# Patient Record
Sex: FEMALE | Race: WHITE | Employment: OTHER | URBAN - NONMETROPOLITAN AREA
[De-identification: names, ages, dates, MRNs, and addresses within clinical notes are randomized per-mention and may not be internally consistent; named-entity substitution may affect disease eponyms.]

---

## 2017-01-17 ENCOUNTER — OFFICE VISIT (OUTPATIENT)
Dept: FAMILY MEDICINE CLINIC | Age: 62
End: 2017-01-17

## 2017-01-17 VITALS
HEART RATE: 80 BPM | HEIGHT: 60 IN | DIASTOLIC BLOOD PRESSURE: 60 MMHG | SYSTOLIC BLOOD PRESSURE: 132 MMHG | BODY MASS INDEX: 26.66 KG/M2 | WEIGHT: 135.8 LBS

## 2017-01-17 DIAGNOSIS — Z00.00 WELLNESS EXAMINATION: Primary | ICD-10-CM

## 2017-01-17 PROCEDURE — 99396 PREV VISIT EST AGE 40-64: CPT | Performed by: FAMILY MEDICINE

## 2017-01-17 RX ORDER — SIMVASTATIN 20 MG
20 TABLET ORAL DAILY
Qty: 90 TABLET | Refills: 1 | Status: SHIPPED | OUTPATIENT
Start: 2017-01-17 | End: 2017-07-17 | Stop reason: SDUPTHER

## 2017-01-17 RX ORDER — TRAZODONE HYDROCHLORIDE 50 MG/1
50 TABLET ORAL NIGHTLY
Qty: 90 TABLET | Refills: 1 | Status: SHIPPED | OUTPATIENT
Start: 2017-01-17 | End: 2017-07-17 | Stop reason: SDUPTHER

## 2017-01-17 RX ORDER — PIOGLITAZONE HCL AND METFORMIN HCL 500; 15 MG/1; MG/1
1 TABLET ORAL 2 TIMES DAILY WITH MEALS
Qty: 180 TABLET | Refills: 1 | Status: SHIPPED | OUTPATIENT
Start: 2017-01-17 | End: 2017-07-17 | Stop reason: SDUPTHER

## 2017-01-17 RX ORDER — CLOPIDOGREL BISULFATE 75 MG/1
75 TABLET ORAL DAILY
Qty: 90 TABLET | Refills: 1 | Status: SHIPPED | OUTPATIENT
Start: 2017-01-17 | End: 2017-07-17 | Stop reason: SDUPTHER

## 2017-01-17 RX ORDER — LOSARTAN POTASSIUM 100 MG/1
100 TABLET ORAL DAILY
Qty: 90 TABLET | Refills: 1 | Status: SHIPPED | OUTPATIENT
Start: 2017-01-17 | End: 2017-07-17 | Stop reason: SDUPTHER

## 2017-01-17 RX ORDER — BUPROPION HYDROCHLORIDE 150 MG/1
150 TABLET, EXTENDED RELEASE ORAL 2 TIMES DAILY
Qty: 180 TABLET | Refills: 1 | Status: SHIPPED | OUTPATIENT
Start: 2017-01-17 | End: 2017-07-17 | Stop reason: SDUPTHER

## 2017-01-19 LAB
CHOLESTEROL: 179 MG/DL
GLUCOSE: 81 MG/DL (ref 70–100)
HDLC SERPL-MCNC: 109 MG/DL (ref 40–60)

## 2017-01-20 LAB — LDL CHOLESTEROL DIRECT: 73 MG/DL

## 2017-03-30 RX ORDER — CHLORDIAZEPOXIDE HYDROCHLORIDE AND CLIDINIUM BROMIDE 5; 2.5 MG/1; MG/1
CAPSULE ORAL
Qty: 180 CAPSULE | Refills: 0 | Status: SHIPPED | OUTPATIENT
Start: 2017-03-30 | End: 2017-06-28 | Stop reason: SDUPTHER

## 2017-04-04 ENCOUNTER — TELEPHONE (OUTPATIENT)
Dept: FAMILY MEDICINE CLINIC | Age: 62
End: 2017-04-04

## 2017-05-12 ENCOUNTER — OFFICE VISIT (OUTPATIENT)
Dept: FAMILY MEDICINE CLINIC | Age: 62
End: 2017-05-12

## 2017-05-12 VITALS
BODY MASS INDEX: 28.12 KG/M2 | WEIGHT: 144 LBS | SYSTOLIC BLOOD PRESSURE: 134 MMHG | HEART RATE: 72 BPM | DIASTOLIC BLOOD PRESSURE: 70 MMHG

## 2017-05-12 DIAGNOSIS — R53.82 CHRONIC FATIGUE: Primary | ICD-10-CM

## 2017-05-12 PROCEDURE — 99212 OFFICE O/P EST SF 10 MIN: CPT | Performed by: FAMILY MEDICINE

## 2017-05-12 ASSESSMENT — PATIENT HEALTH QUESTIONNAIRE - PHQ9
SUM OF ALL RESPONSES TO PHQ9 QUESTIONS 1 & 2: 0
SUM OF ALL RESPONSES TO PHQ QUESTIONS 1-9: 0
1. LITTLE INTEREST OR PLEASURE IN DOING THINGS: 0
2. FEELING DOWN, DEPRESSED OR HOPELESS: 0

## 2017-05-25 ENCOUNTER — TELEPHONE (OUTPATIENT)
Dept: FAMILY MEDICINE CLINIC | Age: 62
End: 2017-05-25

## 2017-06-22 ENCOUNTER — OFFICE VISIT (OUTPATIENT)
Dept: FAMILY MEDICINE CLINIC | Age: 62
End: 2017-06-22

## 2017-06-22 VITALS
HEIGHT: 64 IN | DIASTOLIC BLOOD PRESSURE: 74 MMHG | HEART RATE: 66 BPM | SYSTOLIC BLOOD PRESSURE: 126 MMHG | BODY MASS INDEX: 24.59 KG/M2 | WEIGHT: 144 LBS

## 2017-06-22 DIAGNOSIS — E11.9 WELL CONTROLLED TYPE 2 DIABETES MELLITUS (HCC): Chronic | ICD-10-CM

## 2017-06-22 DIAGNOSIS — R10.84 GENERALIZED ABDOMINAL PAIN: Primary | ICD-10-CM

## 2017-06-22 DIAGNOSIS — Z12.11 SCREENING FOR COLON CANCER: ICD-10-CM

## 2017-06-22 LAB
CREATININE URINE POCT: 200
MICROALBUMIN/CREAT 24H UR: 30 MG/G{CREAT}
MICROALBUMIN/CREAT UR-RTO: <30

## 2017-06-22 PROCEDURE — 99213 OFFICE O/P EST LOW 20 MIN: CPT | Performed by: FAMILY MEDICINE

## 2017-06-22 PROCEDURE — 82044 UR ALBUMIN SEMIQUANTITATIVE: CPT | Performed by: FAMILY MEDICINE

## 2017-06-22 ASSESSMENT — ENCOUNTER SYMPTOMS
ABDOMINAL PAIN: 1
SHORTNESS OF BREATH: 0
ABDOMINAL DISTENTION: 1
VOMITING: 0
BLOOD IN STOOL: 0
NAUSEA: 0
CONSTIPATION: 1
CHEST TIGHTNESS: 0
DIARRHEA: 0

## 2017-06-27 DIAGNOSIS — Z12.11 SCREENING FOR COLON CANCER: ICD-10-CM

## 2017-06-27 LAB
CONTROL: PRESENT
HEMOCCULT STL QL: NEGATIVE

## 2017-06-27 PROCEDURE — 82274 ASSAY TEST FOR BLOOD FECAL: CPT | Performed by: FAMILY MEDICINE

## 2017-06-28 RX ORDER — CHLORDIAZEPOXIDE HYDROCHLORIDE AND CLIDINIUM BROMIDE 5; 2.5 MG/1; MG/1
CAPSULE ORAL
Qty: 180 CAPSULE | Refills: 0 | Status: SHIPPED | OUTPATIENT
Start: 2017-06-28 | End: 2017-07-17 | Stop reason: SDUPTHER

## 2017-07-17 ENCOUNTER — OFFICE VISIT (OUTPATIENT)
Dept: FAMILY MEDICINE CLINIC | Age: 62
End: 2017-07-17
Payer: COMMERCIAL

## 2017-07-17 VITALS
HEIGHT: 64 IN | SYSTOLIC BLOOD PRESSURE: 108 MMHG | WEIGHT: 144 LBS | HEART RATE: 74 BPM | DIASTOLIC BLOOD PRESSURE: 60 MMHG | BODY MASS INDEX: 24.59 KG/M2

## 2017-07-17 DIAGNOSIS — R29.898 WEAKNESS OF BOTH LOWER EXTREMITIES: ICD-10-CM

## 2017-07-17 DIAGNOSIS — I63.00 CEREBRAL INFARCTION DUE TO THROMBOSIS OF PRECEREBRAL ARTERY (HCC): Chronic | ICD-10-CM

## 2017-07-17 DIAGNOSIS — J30.89 PERENNIAL ALLERGIC RHINITIS, UNSPECIFIED ALLERGIC RHINITIS TRIGGER: Chronic | ICD-10-CM

## 2017-07-17 DIAGNOSIS — E78.00 PURE HYPERCHOLESTEROLEMIA: ICD-10-CM

## 2017-07-17 DIAGNOSIS — I10 ESSENTIAL HYPERTENSION: Chronic | ICD-10-CM

## 2017-07-17 DIAGNOSIS — F33.1 MODERATE EPISODE OF RECURRENT MAJOR DEPRESSIVE DISORDER (HCC): ICD-10-CM

## 2017-07-17 DIAGNOSIS — K58.9 IRRITABLE BOWEL SYNDROME WITHOUT DIARRHEA: ICD-10-CM

## 2017-07-17 DIAGNOSIS — F41.9 ANXIETY DISORDER, UNSPECIFIED TYPE: ICD-10-CM

## 2017-07-17 DIAGNOSIS — E11.9 WELL CONTROLLED TYPE 2 DIABETES MELLITUS (HCC): Primary | Chronic | ICD-10-CM

## 2017-07-17 DIAGNOSIS — Z12.31 ENCOUNTER FOR SCREENING MAMMOGRAM FOR BREAST CANCER: ICD-10-CM

## 2017-07-17 DIAGNOSIS — F51.01 PRIMARY INSOMNIA: ICD-10-CM

## 2017-07-17 LAB — HBA1C MFR BLD: 5.6 %

## 2017-07-17 PROCEDURE — 99214 OFFICE O/P EST MOD 30 MIN: CPT | Performed by: FAMILY MEDICINE

## 2017-07-17 PROCEDURE — 83036 HEMOGLOBIN GLYCOSYLATED A1C: CPT | Performed by: FAMILY MEDICINE

## 2017-07-17 RX ORDER — LOSARTAN POTASSIUM 100 MG/1
100 TABLET ORAL DAILY
Qty: 90 TABLET | Refills: 1 | Status: SHIPPED | OUTPATIENT
Start: 2017-07-17 | End: 2018-01-15 | Stop reason: SDUPTHER

## 2017-07-17 RX ORDER — BUPROPION HYDROCHLORIDE 150 MG/1
150 TABLET, EXTENDED RELEASE ORAL 2 TIMES DAILY
Qty: 180 TABLET | Refills: 1 | Status: SHIPPED | OUTPATIENT
Start: 2017-07-17 | End: 2018-01-15 | Stop reason: SDUPTHER

## 2017-07-17 RX ORDER — SIMVASTATIN 20 MG
20 TABLET ORAL DAILY
Qty: 90 TABLET | Refills: 1 | Status: SHIPPED | OUTPATIENT
Start: 2017-07-17 | End: 2018-01-15 | Stop reason: SDUPTHER

## 2017-07-17 RX ORDER — TRAZODONE HYDROCHLORIDE 50 MG/1
50 TABLET ORAL NIGHTLY
Qty: 90 TABLET | Refills: 1 | Status: SHIPPED | OUTPATIENT
Start: 2017-07-17 | End: 2018-01-15 | Stop reason: SDUPTHER

## 2017-07-17 RX ORDER — CHLORDIAZEPOXIDE HYDROCHLORIDE AND CLIDINIUM BROMIDE 5; 2.5 MG/1; MG/1
CAPSULE ORAL
Qty: 180 CAPSULE | Refills: 0 | Status: SHIPPED | OUTPATIENT
Start: 2017-07-17 | End: 2018-01-15 | Stop reason: SDUPTHER

## 2017-07-17 RX ORDER — PIOGLITAZONE HCL AND METFORMIN HCL 500; 15 MG/1; MG/1
1 TABLET ORAL 2 TIMES DAILY WITH MEALS
Qty: 180 TABLET | Refills: 1 | Status: SHIPPED | OUTPATIENT
Start: 2017-07-17 | End: 2018-01-16 | Stop reason: CLARIF

## 2017-07-17 RX ORDER — HYDROXYZINE HYDROCHLORIDE 25 MG/1
25 TABLET, FILM COATED ORAL EVERY 8 HOURS PRN
Qty: 90 TABLET | Refills: 1 | Status: SHIPPED | OUTPATIENT
Start: 2017-07-17 | End: 2017-07-27

## 2017-07-17 RX ORDER — CLOPIDOGREL BISULFATE 75 MG/1
75 TABLET ORAL DAILY
Qty: 90 TABLET | Refills: 1 | Status: SHIPPED | OUTPATIENT
Start: 2017-07-17 | End: 2018-01-15 | Stop reason: SDUPTHER

## 2017-07-17 RX ORDER — FLUTICASONE PROPIONATE 50 MCG
SPRAY, SUSPENSION (ML) NASAL
Qty: 3 BOTTLE | Refills: 1 | Status: SHIPPED | OUTPATIENT
Start: 2017-07-17 | End: 2018-04-11

## 2017-07-17 RX ORDER — LACTULOSE 10 G/15ML
10 SOLUTION ORAL 2 TIMES DAILY PRN
Qty: 4 BOTTLE | Refills: 1 | Status: SHIPPED | OUTPATIENT
Start: 2017-07-17 | End: 2017-07-18 | Stop reason: SDUPTHER

## 2017-07-17 ASSESSMENT — ENCOUNTER SYMPTOMS
EYE DISCHARGE: 0
EYE REDNESS: 0
DIARRHEA: 0
SHORTNESS OF BREATH: 0
CONSTIPATION: 0
COLOR CHANGE: 0
VOMITING: 0
NAUSEA: 0
CHEST TIGHTNESS: 0

## 2017-07-18 DIAGNOSIS — K58.9 IRRITABLE BOWEL SYNDROME WITHOUT DIARRHEA: ICD-10-CM

## 2017-07-18 RX ORDER — LACTULOSE 10 G/15ML
10 SOLUTION ORAL 2 TIMES DAILY PRN
Qty: 4 BOTTLE | Refills: 1 | Status: SHIPPED | OUTPATIENT
Start: 2017-07-18 | End: 2017-07-21 | Stop reason: SDUPTHER

## 2017-07-19 ENCOUNTER — HOSPITAL ENCOUNTER (OUTPATIENT)
Dept: MAMMOGRAPHY | Age: 62
Discharge: HOME | End: 2017-07-19
Payer: COMMERCIAL

## 2017-07-19 DIAGNOSIS — Z12.31 ENCOUNTER FOR SCREENING MAMMOGRAM FOR BREAST CANCER: ICD-10-CM

## 2017-07-19 PROCEDURE — G0202 SCR MAMMO BI INCL CAD: HCPCS

## 2017-07-21 ENCOUNTER — HOSPITAL ENCOUNTER (OUTPATIENT)
Dept: WOMENS IMAGING | Age: 62
Discharge: HOME | End: 2017-07-21
Payer: COMMERCIAL

## 2017-07-21 ENCOUNTER — APPOINTMENT (OUTPATIENT)
Dept: WOMENS IMAGING | Age: 62
End: 2017-07-21
Payer: COMMERCIAL

## 2017-07-21 DIAGNOSIS — K58.9 IRRITABLE BOWEL SYNDROME WITHOUT DIARRHEA: ICD-10-CM

## 2017-07-21 DIAGNOSIS — R92.2 BREAST DENSITY: ICD-10-CM

## 2017-07-21 PROCEDURE — G0206 DX MAMMO INCL CAD UNI: HCPCS

## 2017-07-21 RX ORDER — LACTULOSE 10 G/15ML
10 SOLUTION ORAL 2 TIMES DAILY PRN
Qty: 946 ML | Refills: 1 | Status: SHIPPED | OUTPATIENT
Start: 2017-07-21 | End: 2018-04-03 | Stop reason: SDUPTHER

## 2017-09-25 ENCOUNTER — TELEPHONE (OUTPATIENT)
Dept: FAMILY MEDICINE CLINIC | Age: 62
End: 2017-09-25

## 2017-09-27 ENCOUNTER — OFFICE VISIT (OUTPATIENT)
Dept: FAMILY MEDICINE CLINIC | Age: 62
End: 2017-09-27
Payer: COMMERCIAL

## 2017-09-27 VITALS
BODY MASS INDEX: 24.62 KG/M2 | HEIGHT: 64 IN | WEIGHT: 144.2 LBS | HEART RATE: 72 BPM | DIASTOLIC BLOOD PRESSURE: 74 MMHG | SYSTOLIC BLOOD PRESSURE: 104 MMHG

## 2017-09-27 DIAGNOSIS — F33.1 MODERATE EPISODE OF RECURRENT MAJOR DEPRESSIVE DISORDER (HCC): Primary | ICD-10-CM

## 2017-09-27 DIAGNOSIS — F41.9 ANXIETY DISORDER, UNSPECIFIED TYPE: ICD-10-CM

## 2017-09-27 DIAGNOSIS — J30.1 SEASONAL ALLERGIC RHINITIS DUE TO POLLEN: ICD-10-CM

## 2017-09-27 DIAGNOSIS — H92.01 RIGHT EAR PAIN: ICD-10-CM

## 2017-09-27 PROCEDURE — 99214 OFFICE O/P EST MOD 30 MIN: CPT | Performed by: FAMILY MEDICINE

## 2017-09-27 RX ORDER — BUSPIRONE HYDROCHLORIDE 5 MG/1
5 TABLET ORAL 3 TIMES DAILY
Qty: 90 TABLET | Refills: 0 | Status: SHIPPED | OUTPATIENT
Start: 2017-09-27 | End: 2017-12-06 | Stop reason: SDUPTHER

## 2017-09-27 RX ORDER — LEVOMEFOLATE CALCIUM 7.5 MG TABLET
1 TABLET DAILY
Qty: 90 TABLET | Refills: 0 | Status: SHIPPED | OUTPATIENT
Start: 2017-09-27 | End: 2018-01-15 | Stop reason: SDUPTHER

## 2017-09-27 RX ORDER — LORATADINE 10 MG/1
10 TABLET ORAL DAILY
Qty: 30 TABLET | Refills: 2 | Status: SHIPPED | OUTPATIENT
Start: 2017-09-27 | End: 2020-08-12

## 2017-11-17 ENCOUNTER — NURSE ONLY (OUTPATIENT)
Dept: FAMILY MEDICINE CLINIC | Age: 62
End: 2017-11-17
Payer: COMMERCIAL

## 2017-11-17 DIAGNOSIS — Z23 NEED FOR INFLUENZA VACCINATION: Primary | ICD-10-CM

## 2017-11-17 PROCEDURE — 90688 IIV4 VACCINE SPLT 0.5 ML IM: CPT | Performed by: FAMILY MEDICINE

## 2017-11-17 PROCEDURE — 90471 IMMUNIZATION ADMIN: CPT | Performed by: FAMILY MEDICINE

## 2017-11-17 NOTE — PROGRESS NOTES
Sai Velez comes in for Nurse/CMA visit for seasonal flu vaccine. Administered in Left Deltoid by Rabia Hernandez CMA (AAMA). Patient tolerated well. Advised to call office with any delayed reaction.

## 2017-12-06 ENCOUNTER — OFFICE VISIT (OUTPATIENT)
Dept: FAMILY MEDICINE CLINIC | Age: 62
End: 2017-12-06
Payer: COMMERCIAL

## 2017-12-06 VITALS
HEIGHT: 64 IN | SYSTOLIC BLOOD PRESSURE: 122 MMHG | HEART RATE: 80 BPM | BODY MASS INDEX: 24.79 KG/M2 | WEIGHT: 145.2 LBS | DIASTOLIC BLOOD PRESSURE: 82 MMHG

## 2017-12-06 DIAGNOSIS — E11.65 TYPE 2 DIABETES MELLITUS WITH HYPERGLYCEMIA, WITHOUT LONG-TERM CURRENT USE OF INSULIN (HCC): ICD-10-CM

## 2017-12-06 DIAGNOSIS — M25.674 JOINT STIFFNESS OF TOE OF RIGHT FOOT: ICD-10-CM

## 2017-12-06 DIAGNOSIS — J02.9 SORE THROAT: ICD-10-CM

## 2017-12-06 DIAGNOSIS — F41.9 ANXIETY DISORDER, UNSPECIFIED TYPE: ICD-10-CM

## 2017-12-06 DIAGNOSIS — F33.1 MODERATE EPISODE OF RECURRENT MAJOR DEPRESSIVE DISORDER (HCC): Primary | ICD-10-CM

## 2017-12-06 PROCEDURE — 99214 OFFICE O/P EST MOD 30 MIN: CPT | Performed by: FAMILY MEDICINE

## 2017-12-06 RX ORDER — BUSPIRONE HYDROCHLORIDE 10 MG/1
10 TABLET ORAL 3 TIMES DAILY
Qty: 90 TABLET | Refills: 1 | Status: SHIPPED | OUTPATIENT
Start: 2017-12-06 | End: 2018-01-15

## 2017-12-06 ASSESSMENT — ENCOUNTER SYMPTOMS
COUGH: 1
RHINORRHEA: 0
SORE THROAT: 1

## 2017-12-06 NOTE — PROGRESS NOTES
[facility] PROFESSIONAL SERVS  [facility]  [address] [address]  Dept: [phone]  Dept Fax: [phone]  Loc: [phone]  PROGRESS NOTE      Visit Date: 12/6/2017    Golden Pollock is a 57 y.o. female who presents today for:  Chief Complaint   Patient presents with    Other     depression-no better on the buspar. Has had 2 higher sugar readings-198 and 204 last week. Had not been eating healthy. .       Subjective:  HPI    2 month f/u    Depression. Not better. Has anxiety as well. Sees a psychologist.  On welbutrin and buspar. She stopped buspar as it was not working at 5 mg 3x per day. Reports anxiety regarding her 's rash. DM:  Glucose was elevated last week to 198 and 204. Eating healthier this week. Has not rechecked her glucose levels. Sore throat and coughing. Had pain in her neck from coughing. Reports pain in her right 1st and 5th big toe pain. toe pain started last week. She states her toes get stuck. No numbness. Review of Systems   Constitutional: Negative for chills and fever. HENT: Positive for sore throat. Negative for rhinorrhea. Respiratory: Positive for cough. Musculoskeletal: Positive for arthralgias and gait problem. Psychiatric/Behavioral: Positive for confusion. The patient is nervous/anxious.       Past Medical History:   Diagnosis Date    Allergic rhinitis     Anxiety     CVA (cerebral infarction)     Depression     Fibromyalgia     Headache(784.0)     Hyperlipidemia     Hypertension     Irritable bowel syndrome     Type II or unspecified type diabetes mellitus without mention of complication, not stated as uncontrolled     Unspecified cerebral artery occlusion with cerebral infarction     Unspecified sleep apnea       Past Surgical History:   Procedure Laterality Date    CARPAL TUNNEL RELEASE Right     [facility]  2012    [facility]    ENDOSCOPY, COLON, DIAGNOSTIC  unsure    EYE SURGERY fluticasone (FLONASE) 50 MCG/ACT nasal spray One spray in each nostril q hs 3 Bottle 1    acyclovir (ZOVIRAX) 5 % CREA Apply topically 5 times per day 1 Tube 1    b complex vitamins capsule Take 1 capsule by mouth daily 100 capsule 3    diclofenac 2 % SOLN Place onto the skin as needed      traMADol (ULTRAM) 50 MG tablet Take 1 tablet by mouth every 6 hours as needed for Pain. (Patient taking differently: Take 50 mg by mouth nightly ) 180 tablet 1    Probiotic Product (PROBIOTIC DAILY PO) Take  by mouth.  FISH OIL 1,000 mg by Does not apply route 2 times daily Indications: Decreased Energy       cetirizine (ZYRTEC) 10 MG tablet Take 10 mg by mouth daily. Indications: Seasonal Allergy      aspirin 81 MG tablet Take 81 mg by mouth daily. No current facility-administered medications for this visit. Allergies   Allergen Reactions    Augmentin [Amoxicillin-Pot Clavulanate] Diarrhea    Ciprofloxacin      unsure    Sulfa Antibiotics Rash     Health Maintenance   Topic Date Due    Hepatitis C screen  1955    HIV screen  10/11/1968    DTaP/Tdap/Td vaccine (1 - Tdap) 10/11/1972    Pneumococcal med risk (1 of 1 - PPSV23) 10/11/1972    Diabetic foot exam  10/19/2016    Diabetic retinal exam  12/27/2017    Lipid screen  01/18/2018    Diabetic microalbuminuria test  06/22/2018    Colon Cancer Screen FIT/FOBT  06/27/2018    Diabetic hemoglobin A1C test  07/17/2018    Breast cancer screen  07/21/2019    Zostavax vaccine  Addressed    Flu vaccine  Completed       Objective:     /82 (Site: Right Arm, Position: Sitting, Cuff Size: Medium Adult)   Pulse 80   Ht 5' 4\" (1.626 m)   Wt 145 lb 3.2 oz (65.9 kg)   BMI 24.92 kg/m²   Physical Exam   Constitutional: She appears well-developed and well-nourished.    HENT:   Right Ear: Tympanic membrane and external ear normal.   Left Ear: Tympanic membrane and external ear normal.   Mouth/Throat: Oropharynx is clear and moist. No oropharyngeal exudate. Cardiovascular: Normal rate and regular rhythm. No murmur heard. Pulmonary/Chest: Breath sounds normal. No respiratory distress. She has no wheezes. Neurological: She is alert. Psychiatric: Her speech is normal and behavior is normal. Her mood appears anxious. She exhibits a depressed mood. Vitals reviewed. Right foot:  Nontender. No swelling or ecchymosis. Good AROM of toes. Negative long bone compression of toes 1-5. Impression/Plan:  1. Moderate episode of recurrent major depressive disorder ([facility] [address]  Partially controlled  -increase dose from 5 to 10 mg 3x per day. busPIRone (BUSPAR) 10 MG tablet; Take 1 tablet by mouth 3 times daily  Dispense: 90 tablet; Refill: 1    2. Anxiety disorder, unspecified type  Partially controlled  - busPIRone (BUSPAR) 10 MG tablet; Take 1 tablet by mouth 3 times daily  Dispense: 90 tablet; Refill: 1    3. Sore throat  Likely viral illness. Rest and hydration. otc meds    4. Type 2 diabetes mellitus with hyperglycemia, without long-term current use of insulin ([facility])  -having hyperglycemia and previously was controlled based on fasting glucose and a1c.  -check glucose as it has been a week. -continue improved diet  -increase exercise. 5. Joint stiffness of toe of right foot  -no clear etiology. Good toe motion. Unlikely fracture. -monitor. They voiced understanding. All questions answered. They agreed with treatment plan. Discussed use, benefit, and side effects of prescribed medications. Reviewed health maintenance. Return if symptoms worsen or fail to improve, for depression.        Electronically signed by Kelly Martinez MD on 12/6/2017 at 3:57 PM

## 2017-12-28 ENCOUNTER — OFFICE VISIT (OUTPATIENT)
Dept: FAMILY MEDICINE CLINIC | Age: 62
End: 2017-12-28
Payer: COMMERCIAL

## 2017-12-28 VITALS
HEART RATE: 62 BPM | WEIGHT: 143 LBS | BODY MASS INDEX: 28.07 KG/M2 | SYSTOLIC BLOOD PRESSURE: 118 MMHG | HEIGHT: 60 IN | DIASTOLIC BLOOD PRESSURE: 62 MMHG

## 2017-12-28 DIAGNOSIS — K64.8 OTHER HEMORRHOIDS: ICD-10-CM

## 2017-12-28 DIAGNOSIS — R21 RASH: Primary | ICD-10-CM

## 2017-12-28 DIAGNOSIS — E11.9 WELL CONTROLLED TYPE 2 DIABETES MELLITUS (HCC): Chronic | ICD-10-CM

## 2017-12-28 PROCEDURE — 99213 OFFICE O/P EST LOW 20 MIN: CPT | Performed by: FAMILY MEDICINE

## 2017-12-28 RX ORDER — HYDROCORTISONE ACETATE 25 MG/1
25 SUPPOSITORY RECTAL EVERY 12 HOURS
Qty: 24 SUPPOSITORY | Refills: 0 | Status: SHIPPED | OUTPATIENT
Start: 2017-12-28 | End: 2019-01-15

## 2017-12-28 RX ORDER — TRIAMCINOLONE ACETONIDE 1 MG/G
CREAM TOPICAL
Qty: 1 TUBE | Refills: 0 | Status: SHIPPED | OUTPATIENT
Start: 2017-12-28 | End: 2018-01-02 | Stop reason: SDUPTHER

## 2017-12-28 NOTE — PROGRESS NOTES
[facility] PROFESSIONAL SERVS  [facility]  [address] [address]  Dept: [phone]  Dept Fax: [phone]  Loc: [phone]  PROGRESS NOTE      Visit Date: 12/28/2017    Petr Jacobo is a 57 y.o. female who presents today for:  Chief Complaint   Patient presents with    Rash     stomach, and bottom, no changes in detergent       Subjective:  HPI    Rash on stomach and buttocks. Has tried gold bond, cortizone 10, antibiotic ointment, aveno, aucerin, and sugar high still. They had an  come out and no bed bugs were found. No detergent changes. Rash present for 3-4 weeks. Hemorrhoids. She requests suppositories.        is present    Review of Systems  Past Medical History:   Diagnosis Date    Allergic rhinitis     Anxiety     CVA (cerebral infarction)     Depression     Fibromyalgia     Headache(784.0)     Hyperlipidemia     Hypertension     Irritable bowel syndrome     Type II or unspecified type diabetes mellitus without mention of complication, not stated as uncontrolled     Unspecified cerebral artery occlusion with cerebral infarction     Unspecified sleep apnea       Past Surgical History:   Procedure Laterality Date    CARPAL TUNNEL RELEASE Right     [facility]    ENDOSCOPY, COLON, DIAGNOSTIC  unsure    EYE SURGERY      lasik    HEMORRHOID SURGERY  unsure    HYSTERECTOMY      total    NECK SURGERY  18  years ago    fusion    SINUS SURGERY  10 years ago    TUBAL LIGATION       Family History   Problem Relation Age of Onset    Diabetes Mother     High Blood Pressure Mother     Heart Disease Mother     Heart Disease Father    [place] Parkinsonism Father     Neurofibromatosis Father    [place] Depression Father     Neurofibromatosis Sister     Neurofibromatosis Brother     Other Brother      multiple sclerosis    Dementia Brother     Diabetes Sister     High Blood Pressure Sister     Depression Sister Social History   Substance Use Topics    Smoking status: Never Smoker    Smokeless tobacco: Never Used    Alcohol use No      Current Outpatient Prescriptions   Medication Sig Dispense Refill    busPIRone (BUSPAR) 10 MG tablet Take 1 tablet by mouth 3 times daily 90 tablet 1    methylfolate (DEPLIN) 7.5 MG TABS tablet Take 1 tablet by mouth daily 90 tablet 0    loratadine (CLARITIN) 10 MG tablet Take 1 tablet by mouth daily 30 tablet 2    lactulose (CHRONULAC) 10 GM/15ML solution Take 15 mLs by mouth 2 times daily as needed (constipation) 946 mL 1    chlordiazePOXIDE-clidinium (LIBRAX) 5-2.5 MG per capsule TAKE 1 CAPSULE TWICE A DAY AS NEEDED FOR PAIN 180 capsule 0    linaclotide (LINZESS) 145 MCG capsule Take 2 capsules by mouth daily as needed (constipation) 30 capsule 3    losartan (COZAAR) 100 MG tablet Take 1 tablet by mouth daily 90 tablet 1    simvastatin (ZOCOR) 20 MG tablet Take 1 tablet by mouth daily 90 tablet 1    pioglitazone-metformin (ACTOPLUS MET)  MG per tablet Take 1 tablet by mouth 2 times daily (with meals) 180 tablet 1    buPROPion (WELLBUTRIN SR) 150 MG extended release tablet Take 1 tablet by mouth 2 times daily 180 tablet 1    clopidogrel (PLAVIX) 75 MG tablet Take 1 tablet by mouth daily 90 tablet 1    traZODone (DESYREL) 50 MG tablet Take 1 tablet by mouth nightly 90 tablet 1    fluticasone (FLONASE) 50 MCG/ACT nasal spray One spray in each nostril q hs 3 Bottle 1    acyclovir (ZOVIRAX) 5 % CREA Apply topically 5 times per day 1 Tube 1    b complex vitamins capsule Take 1 capsule by mouth daily 100 capsule 3    diclofenac 2 % SOLN Place onto the skin as needed      traMADol (ULTRAM) 50 MG tablet Take 1 tablet by mouth every 6 hours as needed for Pain. (Patient taking differently: Take 50 mg by mouth nightly ) 180 tablet 1    Probiotic Product (PROBIOTIC DAILY PO) Take  by mouth.       FISH OIL 1,000 mg by Does not apply route 2 times daily Indications: 0    3. Well controlled type 2 diabetes mellitus ([facility] [address]  -monitor glucose. Consider medication change at upcoming appt for DM. They voiced understanding. All questions answered. They agreed with treatment plan. Discussed use, benefit, and side effects of prescribed medications. Reviewed health maintenance. Return if symptoms worsen or fail to improve.        Electronically signed by John Linda MD on 12/28/2017 at 1:22 PM

## 2018-01-02 DIAGNOSIS — R21 RASH: ICD-10-CM

## 2018-01-02 RX ORDER — TRIAMCINOLONE ACETONIDE 1 MG/G
CREAM TOPICAL
Qty: 45 G | Refills: 1 | Status: SHIPPED | OUTPATIENT
Start: 2018-01-02 | End: 2018-04-11

## 2018-01-08 ENCOUNTER — OFFICE VISIT (OUTPATIENT)
Dept: PSYCHOLOGY | Age: 63
End: 2018-01-08
Payer: COMMERCIAL

## 2018-01-08 DIAGNOSIS — F41.1 GENERALIZED ANXIETY DISORDER: Primary | ICD-10-CM

## 2018-01-08 DIAGNOSIS — F33.0 MILD EPISODE OF RECURRENT MAJOR DEPRESSIVE DISORDER (HCC): ICD-10-CM

## 2018-01-08 PROCEDURE — 90834 PSYTX W PT 45 MINUTES: CPT | Performed by: PSYCHOLOGIST

## 2018-01-08 ASSESSMENT — ANXIETY QUESTIONNAIRES
1. FEELING NERVOUS, ANXIOUS, OR ON EDGE: 1-SEVERAL DAYS
2. NOT BEING ABLE TO STOP OR CONTROL WORRYING: 0-NOT AT ALL SURE
7. FEELING AFRAID AS IF SOMETHING AWFUL MIGHT HAPPEN: 0-NOT AT ALL SURE
6. BECOMING EASILY ANNOYED OR IRRITABLE: 0-NOT AT ALL SURE
4. TROUBLE RELAXING: 0-NOT AT ALL SURE
5. BEING SO RESTLESS THAT IT IS HARD TO SIT STILL: 0-NOT AT ALL SURE
3. WORRYING TOO MUCH ABOUT DIFFERENT THINGS: 0-NOT AT ALL SURE
GAD7 TOTAL SCORE: 1

## 2018-01-08 ASSESSMENT — PATIENT HEALTH QUESTIONNAIRE - PHQ9
7. TROUBLE CONCENTRATING ON THINGS, SUCH AS READING THE NEWSPAPER OR WATCHING TELEVISION: 0
1. LITTLE INTEREST OR PLEASURE IN DOING THINGS: 1
SUM OF ALL RESPONSES TO PHQ QUESTIONS 1-9: 2
SUM OF ALL RESPONSES TO PHQ9 QUESTIONS 1 & 2: 1
3. TROUBLE FALLING OR STAYING ASLEEP: 0
9. THOUGHTS THAT YOU WOULD BE BETTER OFF DEAD, OR OF HURTING YOURSELF: 0
5. POOR APPETITE OR OVEREATING: 0
2. FEELING DOWN, DEPRESSED OR HOPELESS: 0
10. IF YOU CHECKED OFF ANY PROBLEMS, HOW DIFFICULT HAVE THESE PROBLEMS MADE IT FOR YOU TO DO YOUR WORK, TAKE CARE OF THINGS AT HOME, OR GET ALONG WITH OTHER PEOPLE: 0
8. MOVING OR SPEAKING SO SLOWLY THAT OTHER PEOPLE COULD HAVE NOTICED. OR THE OPPOSITE, BEING SO FIGETY OR RESTLESS THAT YOU HAVE BEEN MOVING AROUND A LOT MORE THAN USUAL: 0
6. FEELING BAD ABOUT YOURSELF - OR THAT YOU ARE A FAILURE OR HAVE LET YOURSELF OR YOUR FAMILY DOWN: 0
4. FEELING TIRED OR HAVING LITTLE ENERGY: 1

## 2018-01-08 ASSESSMENT — LIFESTYLE VARIABLES: HOW OFTEN DO YOU HAVE A DRINK CONTAINING ALCOHOL: 0

## 2018-01-15 ENCOUNTER — OFFICE VISIT (OUTPATIENT)
Dept: FAMILY MEDICINE CLINIC | Age: 63
End: 2018-01-15
Payer: COMMERCIAL

## 2018-01-15 VITALS
WEIGHT: 142 LBS | BODY MASS INDEX: 27.88 KG/M2 | SYSTOLIC BLOOD PRESSURE: 112 MMHG | DIASTOLIC BLOOD PRESSURE: 70 MMHG | HEIGHT: 60 IN | HEART RATE: 60 BPM

## 2018-01-15 DIAGNOSIS — F41.9 ANXIETY DISORDER, UNSPECIFIED TYPE: ICD-10-CM

## 2018-01-15 DIAGNOSIS — I10 ESSENTIAL HYPERTENSION: ICD-10-CM

## 2018-01-15 DIAGNOSIS — R23.3 EASY BRUISING: ICD-10-CM

## 2018-01-15 DIAGNOSIS — E78.00 PURE HYPERCHOLESTEROLEMIA: ICD-10-CM

## 2018-01-15 DIAGNOSIS — I63.00 CEREBRAL INFARCTION DUE TO THROMBOSIS OF PRECEREBRAL ARTERY (HCC): Chronic | ICD-10-CM

## 2018-01-15 DIAGNOSIS — F51.01 PRIMARY INSOMNIA: ICD-10-CM

## 2018-01-15 DIAGNOSIS — K58.9 IRRITABLE BOWEL SYNDROME WITHOUT DIARRHEA: ICD-10-CM

## 2018-01-15 DIAGNOSIS — F33.1 MODERATE EPISODE OF RECURRENT MAJOR DEPRESSIVE DISORDER (HCC): ICD-10-CM

## 2018-01-15 DIAGNOSIS — E11.9 TYPE 2 DIABETES MELLITUS WITHOUT COMPLICATION, WITHOUT LONG-TERM CURRENT USE OF INSULIN (HCC): Primary | ICD-10-CM

## 2018-01-15 DIAGNOSIS — L29.9 ITCHING: ICD-10-CM

## 2018-01-15 PROCEDURE — 99214 OFFICE O/P EST MOD 30 MIN: CPT | Performed by: FAMILY MEDICINE

## 2018-01-15 RX ORDER — SIMVASTATIN 20 MG
20 TABLET ORAL DAILY
Qty: 90 TABLET | Refills: 1 | Status: SHIPPED | OUTPATIENT
Start: 2018-01-15 | End: 2018-07-14 | Stop reason: SDUPTHER

## 2018-01-15 RX ORDER — PIOGLITAZONE HCL AND METFORMIN HCL 500; 15 MG/1; MG/1
1 TABLET ORAL 2 TIMES DAILY WITH MEALS
Qty: 180 TABLET | Refills: 1 | Status: CANCELLED | OUTPATIENT
Start: 2018-01-15

## 2018-01-15 RX ORDER — LOSARTAN POTASSIUM 100 MG/1
100 TABLET ORAL DAILY
Qty: 90 TABLET | Refills: 1 | Status: SHIPPED | OUTPATIENT
Start: 2018-01-15 | End: 2018-07-14 | Stop reason: SDUPTHER

## 2018-01-15 RX ORDER — HYDROXYZINE HYDROCHLORIDE 25 MG/1
25 TABLET, FILM COATED ORAL 3 TIMES DAILY PRN
Qty: 90 TABLET | Refills: 2 | Status: SHIPPED | OUTPATIENT
Start: 2018-01-15 | End: 2018-02-14

## 2018-01-15 RX ORDER — CLOPIDOGREL BISULFATE 75 MG/1
75 TABLET ORAL DAILY
Qty: 90 TABLET | Refills: 1 | Status: SHIPPED | OUTPATIENT
Start: 2018-01-15 | End: 2018-07-14 | Stop reason: SDUPTHER

## 2018-01-15 RX ORDER — CHLORDIAZEPOXIDE HYDROCHLORIDE AND CLIDINIUM BROMIDE 5; 2.5 MG/1; MG/1
CAPSULE ORAL
Qty: 180 CAPSULE | Refills: 0 | Status: SHIPPED | OUTPATIENT
Start: 2018-01-15 | End: 2018-02-22 | Stop reason: SDUPTHER

## 2018-01-15 RX ORDER — BUPROPION HYDROCHLORIDE 150 MG/1
150 TABLET, EXTENDED RELEASE ORAL 2 TIMES DAILY
Qty: 180 TABLET | Refills: 1 | Status: SHIPPED | OUTPATIENT
Start: 2018-01-15 | End: 2018-07-14 | Stop reason: SDUPTHER

## 2018-01-15 RX ORDER — TRAZODONE HYDROCHLORIDE 50 MG/1
50 TABLET ORAL NIGHTLY
Qty: 90 TABLET | Refills: 1 | Status: SHIPPED | OUTPATIENT
Start: 2018-01-15 | End: 2018-05-15

## 2018-01-15 RX ORDER — LEVOMEFOLATE CALCIUM 7.5 MG TABLET
1 TABLET DAILY
Qty: 90 TABLET | Refills: 0 | Status: SHIPPED | OUTPATIENT
Start: 2018-01-15 | End: 2018-03-15 | Stop reason: CLARIF

## 2018-01-15 RX ORDER — PIOGLITAZONEHYDROCHLORIDE 30 MG/1
30 TABLET ORAL DAILY
Qty: 30 TABLET | Refills: 5 | Status: SHIPPED | OUTPATIENT
Start: 2018-01-15 | End: 2018-02-22 | Stop reason: SDUPTHER

## 2018-01-15 ASSESSMENT — ENCOUNTER SYMPTOMS
EYE REDNESS: 0
SHORTNESS OF BREATH: 0
CONSTIPATION: 1
EYE DISCHARGE: 0
NAUSEA: 0
COLOR CHANGE: 0
VOMITING: 0
CHEST TIGHTNESS: 0

## 2018-01-15 NOTE — PROGRESS NOTES
well-nourished. No distress. HENT:   Head: Normocephalic and atraumatic. Nose: Nose normal.   Eyes: Conjunctivae and EOM are normal.   Neck: Normal range of motion. Neck supple. Cardiovascular: Normal rate and regular rhythm. Pulmonary/Chest: Effort normal and breath sounds normal. No respiratory distress. She has no wheezes. Abdominal: Soft. Bowel sounds are normal.   Neurological: She is alert and oriented to person, place, and time. Skin: Skin is warm and dry. No rash noted. No erythema. Multiple bruises on arms and legs. Psychiatric: She has a normal mood and affect. Her behavior is normal.   Vitals reviewed. Assessment/Plan: Ayesha Gonzalez was seen today for 6 month follow-up, diabetes, hypertension and hyperlipidemia. Diagnoses and all orders for this visit:    Type 2 diabetes mellitus without complication, without long-term current use of insulin (HCC)  -     Hemoglobin A1C; Future  -     Hemoglobin A1C; Future  -     metFORMIN (GLUCOPHAGE) 1000 MG tablet; Take 1 tablet by mouth 2 times daily (with meals)  -     pioglitazone (ACTOS) 30 MG tablet; Take 1 tablet by mouth daily    Essential hypertension  -     Comprehensive Metabolic Panel; Future  -     losartan (COZAAR) 100 MG tablet; Take 1 tablet by mouth daily    Pure hypercholesterolemia  -     Lipid Panel; Future  -     simvastatin (ZOCOR) 20 MG tablet; Take 1 tablet by mouth daily    Moderate episode of recurrent major depressive disorder (HCC)    Anxiety disorder, unspecified type  -     buPROPion (WELLBUTRIN SR) 150 MG extended release tablet; Take 1 tablet by mouth 2 times daily    Irritable bowel syndrome without diarrhea  -     chlordiazePOXIDE-clidinium (LIBRAX) 5-2.5 MG per capsule; TAKE 1 CAPSULE TWICE A DAY AS NEEDED FOR PAIN. 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 (HCC)  -     clopidogrel (PLAVIX) 75 Sig: Take 1 tablet by mouth 2 times daily    clopidogrel (PLAVIX) 75 MG tablet 90 tablet 1     Sig: Take 1 tablet by mouth daily    traZODone (DESYREL) 50 MG tablet 90 tablet 1     Sig: Take 1 tablet by mouth nightly    hydrOXYzine (ATARAX) 25 MG tablet 90 tablet 2     Sig: Take 1 tablet by mouth 3 times daily as needed for Itching    metFORMIN (GLUCOPHAGE) 1000 MG tablet 60 tablet 5     Sig: Take 1 tablet by mouth 2 times daily (with meals)    pioglitazone (ACTOS) 30 MG tablet 30 tablet 5     Sig: Take 1 tablet by mouth daily       All patient questions answered. Patient voiced understanding. Quality Measures    Body mass index is 27.73 kg/m². Elevated. Weight control planned discussed Healthy diet and regular exercise. BP: 112/70 Blood pressure is normal. Treatment plan consists of No treatment change needed. Lab Results   Component Value Date    LDLCALC 66 02/18/2016    LDLDIRECT 73 01/18/2017    (goal LDL reduction with dx if diabetes is 50% LDL reduction)      PHQ Scores 1/8/2018 5/12/2017   PHQ2 Score 1 0   PHQ9 Score 2 0     Interpretation of Total Score Depression Severity: 1-4 = Minimal depression, 5-9 = Mild depression, 10-14 = Moderate depression, 15-19 = Moderately severe depression, 20-27 = Severe depression    Return in about 6 months (around 7/15/2018) for diabetes, hypertension, hyperlipidemia.     Electronically signed by Denae Manjarrez MD on 1/15/2018 at 2:54 PM

## 2018-01-15 NOTE — PATIENT INSTRUCTIONS
when cooking. · Don't skip meals. Your blood sugar may drop too low if you skip meals and take insulin or certain medicines for diabetes. · Check with your doctor before you drink alcohol. Alcohol can cause your blood sugar to drop too low. Alcohol can also cause a bad reaction if you take certain diabetes medicines. Follow-up care is a key part of your treatment and safety. Be sure to make and go to all appointments, and call your doctor if you are having problems. It's also a good idea to know your test results and keep a list of the medicines you take. Where can you learn more? Go to [email or web address]://[email or web address] Speechpepiceweb.[email or web address] org and sign in to your [employer] account. Enter A146 in the Circular box to learn more about \"Learning About Diabetes Food Guidelines. \"     If you do not have an account, please click on the \"Sign Up Now\" link. Current as of: March 13, 2017  Content Version: 11.5  © 3150-3041 [employer]wise, Incorporated. Care instructions adapted under license by [facility] ([facility]). If you have questions about a medical condition or this instruction, always ask your healthcare professional. [place] [address] any warranty or liability for your use of this information.

## 2018-01-16 ENCOUNTER — NURSE ONLY (OUTPATIENT)
Dept: FAMILY MEDICINE CLINIC | Age: 63
End: 2018-01-16
Payer: COMMERCIAL

## 2018-01-16 ENCOUNTER — TELEPHONE (OUTPATIENT)
Dept: FAMILY MEDICINE CLINIC | Age: 63
End: 2018-01-16

## 2018-01-16 DIAGNOSIS — I10 ESSENTIAL HYPERTENSION: ICD-10-CM

## 2018-01-16 DIAGNOSIS — R23.3 EASY BRUISING: ICD-10-CM

## 2018-01-16 DIAGNOSIS — E11.22 CKD STAGE 4 DUE TO TYPE 2 DIABETES MELLITUS (HCC): Primary | ICD-10-CM

## 2018-01-16 DIAGNOSIS — E78.00 PURE HYPERCHOLESTEROLEMIA: ICD-10-CM

## 2018-01-16 DIAGNOSIS — N18.4 CKD STAGE 4 DUE TO TYPE 2 DIABETES MELLITUS (HCC): Primary | ICD-10-CM

## 2018-01-16 DIAGNOSIS — E11.9 TYPE 2 DIABETES MELLITUS WITHOUT COMPLICATION, WITHOUT LONG-TERM CURRENT USE OF INSULIN (HCC): ICD-10-CM

## 2018-01-16 LAB
ALBUMIN SERPL-MCNC: 4.4 G/DL (ref 3.5–5.1)
ALP BLD-CCNC: 77 U/L (ref 38–126)
ALT SERPL-CCNC: 10 U/L (ref 11–66)
ANION GAP SERPL CALCULATED.3IONS-SCNC: 15 MEQ/L (ref 8–16)
AST SERPL-CCNC: 22 U/L (ref 5–40)
AVERAGE GLUCOSE: 87 MG/DL (ref 70–126)
BASOPHILS # BLD: 1.1 %
BASOPHILS ABSOLUTE: 0.1 THOU/MM3 (ref 0–0.1)
BILIRUB SERPL-MCNC: 0.3 MG/DL (ref 0.3–1.2)
BUN BLDV-MCNC: 37 MG/DL (ref 7–22)
CALCIUM SERPL-MCNC: 9.6 MG/DL (ref 8.5–10.5)
CHLORIDE BLD-SCNC: 106 MEQ/L (ref 98–111)
CHOLESTEROL, TOTAL: 189 MG/DL (ref 100–199)
CO2: 25 MEQ/L (ref 23–33)
CREAT SERPL-MCNC: 1.8 MG/DL (ref 0.4–1.2)
EOSINOPHIL # BLD: 4.4 %
EOSINOPHILS ABSOLUTE: 0.2 THOU/MM3 (ref 0–0.4)
GFR SERPL CREATININE-BSD FRML MDRD: 28 ML/MIN/1.73M2
GLUCOSE BLD-MCNC: 90 MG/DL (ref 70–108)
HBA1C MFR BLD: 4.9 % (ref 4.4–6.4)
HCT VFR BLD CALC: 31 % (ref 37–47)
HDLC SERPL-MCNC: 136 MG/DL
HEMOGLOBIN: 10.2 GM/DL (ref 12–16)
LDL CHOLESTEROL CALCULATED: 43 MG/DL
LYMPHOCYTES # BLD: 45.2 %
LYMPHOCYTES ABSOLUTE: 2.3 THOU/MM3 (ref 1–4.8)
MCH RBC QN AUTO: 31.7 PG (ref 27–31)
MCHC RBC AUTO-ENTMCNC: 32.8 GM/DL (ref 33–37)
MCV RBC AUTO: 96.5 FL (ref 81–99)
MONOCYTES # BLD: 7.9 %
MONOCYTES ABSOLUTE: 0.4 THOU/MM3 (ref 0.4–1.3)
NUCLEATED RED BLOOD CELLS: 0 /100 WBC
PDW BLD-RTO: 14.2 % (ref 11.5–14.5)
PLATELET # BLD: 213 THOU/MM3 (ref 130–400)
PMV BLD AUTO: 9.5 MCM (ref 7.4–10.4)
POTASSIUM SERPL-SCNC: 4.8 MEQ/L (ref 3.5–5.2)
RBC # BLD: 3.21 MILL/MM3 (ref 4.2–5.4)
SEG NEUTROPHILS: 41.4 %
SEGMENTED NEUTROPHILS ABSOLUTE COUNT: 2.1 THOU/MM3 (ref 1.8–7.7)
SODIUM BLD-SCNC: 146 MEQ/L (ref 135–145)
TOTAL PROTEIN: 6.9 G/DL (ref 6.1–8)
TRIGL SERPL-MCNC: 50 MG/DL (ref 0–199)
WBC # BLD: 5 THOU/MM3 (ref 4.8–10.8)

## 2018-01-16 PROCEDURE — 36415 COLL VENOUS BLD VENIPUNCTURE: CPT | Performed by: FAMILY MEDICINE

## 2018-01-16 RX ORDER — ACYCLOVIR 50 MG/G
CREAM TOPICAL
Qty: 1 TUBE | Refills: 1 | Status: SHIPPED | OUTPATIENT
Start: 2018-01-16 | End: 2018-04-11

## 2018-01-16 NOTE — TELEPHONE ENCOUNTER
----- Message from Ariadna Kruse MD sent at 1/16/2018  2:20 PM EST -----  Please let Dena Lozano know that her kidney function is deteriorating. This may be caused by her diabetes and/or high blood pressure. I would like her to establish with a nephrologist... Does she have a preference of who she sees?

## 2018-01-16 NOTE — TELEPHONE ENCOUNTER
Sylvia Castro stopped in for her blood draw and asked about two medications. She had developed a cord sore on her lip and was going to ask you for some of the Acyclovir cream she has gotten in the past, but forgot. I have this set for you to approve. Also, she had me call in # 1 week, #21 , of the Atarax to [place] as she waits for the RX to come from [facility]. This was done.

## 2018-01-17 ENCOUNTER — TELEPHONE (OUTPATIENT)
Dept: FAMILY MEDICINE CLINIC | Age: 63
End: 2018-01-17

## 2018-01-18 ENCOUNTER — TELEPHONE (OUTPATIENT)
Dept: NEPHROLOGY | Age: 63
End: 2018-01-18

## 2018-01-18 ENCOUNTER — OFFICE VISIT (OUTPATIENT)
Dept: NEPHROLOGY | Age: 63
End: 2018-01-18
Payer: COMMERCIAL

## 2018-01-18 ENCOUNTER — TELEPHONE (OUTPATIENT)
Dept: FAMILY MEDICINE CLINIC | Age: 63
End: 2018-01-18

## 2018-01-18 VITALS — DIASTOLIC BLOOD PRESSURE: 64 MMHG | WEIGHT: 141 LBS | BODY MASS INDEX: 27.54 KG/M2 | SYSTOLIC BLOOD PRESSURE: 124 MMHG

## 2018-01-18 DIAGNOSIS — N18.4 CKD (CHRONIC KIDNEY DISEASE), STAGE IV (HCC): Primary | ICD-10-CM

## 2018-01-18 DIAGNOSIS — I10 ESSENTIAL HYPERTENSION: Chronic | ICD-10-CM

## 2018-01-18 PROCEDURE — 99204 OFFICE O/P NEW MOD 45 MIN: CPT | Performed by: INTERNAL MEDICINE

## 2018-01-18 RX ORDER — AMLODIPINE BESYLATE 10 MG/1
10 TABLET ORAL DAILY
Qty: 30 TABLET | Refills: 0 | Status: SHIPPED | OUTPATIENT
Start: 2018-01-18 | End: 2018-01-29 | Stop reason: SDUPTHER

## 2018-01-18 ASSESSMENT — ENCOUNTER SYMPTOMS
BLURRED VISION: 1
CONSTIPATION: 1
COUGH: 0
NAUSEA: 0
HEARTBURN: 0
VOMITING: 0
ABDOMINAL PAIN: 0
DIARRHEA: 0
BACK PAIN: 0
SHORTNESS OF BREATH: 0

## 2018-01-18 NOTE — TELEPHONE ENCOUNTER
Stan Whalen called in about two medications. She states that since the insurance will not pay for the Acyclovir and it is too expensive, the Pharmacy recommended a OTC cream. Also the insurance will not pay for the Methylfolate, so she will look into an OTC form.

## 2018-01-18 NOTE — PROGRESS NOTES
[facility]          [facility]        [address]        [place] [facility] [facility]        [phone]           Outpatient Initial consult note         1/18/2018 11:58 AM    Patient Name:   Hyacinth Lamar  YOB: 1955  Primary Care Physician:  James Hodgson MD     Chief Complaint : Evaluation of   worsening renal function     History of presenting illness : Hyacinth Lamar is a 57 y.o.   female with Past Medical History as stated below was sent / referred by James Hodgson MD for evaluation of the above problem. Patient has a history of hypertension for 15 years. Patient has history of diabetes mellitus, with neuropathy and for 15 years. The patient denies history of renal stones and denies NSAID use. Patient has no history of proteinuria. Patient Never had a kidney biopsy done. Patient does not use Herbal remedies/ vitamin supplements. Patient denies frequency, hematuria, hesitancy. Patient has no family history of kidney disease. Patient's chronic medical conditions of diabetes, hypertension and depression is stable and well controlled with medications at this time. Patient has seen the primary care physician who has noted elevated serum creatinine and so she was referred to us for further evaluation and management.        Past History :     Past Medical History:   Diagnosis Date    Allergic rhinitis     Anxiety     CKD stage 4 due to type 2 diabetes mellitus ([address]     CVA (cerebral infarction)     Depression     Fibromyalgia     Headache(784.0)     Hyperlipidemia     Hypertension     Irritable bowel syndrome     Type II or unspecified type diabetes mellitus without mention of complication, not stated as uncontrolled     Unspecified cerebral artery occlusion with cerebral infarction     Unspecified sleep apnea      Past Surgical History:   Procedure Laterality Date    CARPAL TUNNEL RELEASE Right     COLONOSCOPY  2012     ENDOSCOPY, COLON, DIAGNOSTIC  unsure    EYE SURGERY      lasik    HEMORRHOID SURGERY  unsure    HYSTERECTOMY      total    NECK SURGERY  18  years ago    fusion    SINUS SURGERY  10 years ago    TUBAL LIGATION       Social History     Social History    Marital status:      Spouse name: N/A    Number of children: N/A    Years of education: N/A     Occupational History    unemployed at present time      Social History Main Topics    Smoking status: Never Smoker    Smokeless tobacco: Never Used    Alcohol use No    Drug use: No    Sexual activity: Not on file     Other Topics Concern    Not on file     Social History Narrative    No narrative on file     Family History   Problem Relation Age of Onset    Diabetes Mother     High Blood Pressure Mother     Heart Disease Mother     Heart Disease Father    [place] Parkinsonism Father     Neurofibromatosis Father    [place] Depression Father     Neurofibromatosis Sister     Neurofibromatosis Brother     Other Brother      multiple sclerosis    Dementia Brother     Diabetes Sister     High Blood Pressure Sister     Depression Sister      Medications & Allergies :      Prior to Admission medications    Medication Sig Start Date End Date Taking? Authorizing Provider   acyclovir (ZOVIRAX) 5 % CREA Apply topically 5 times per day  Patient taking differently: Apply topically 5 times per day  01-18-18 per patient. She is going to get this medication over the counter. Too expensive as a prescription. 1/16/18  Yes Taco Carlson MD   chlordiazePOXIDE-clidinium (LIBRAX) 5-2.5 MG per capsule TAKE 1 CAPSULE TWICE A DAY AS NEEDED FOR PAIN.  1/15/18  Yes Taco Carlson MD   linaclotide Ardia Landau) 145 MCG capsule Take 2 capsules by mouth daily as needed (constipation) 1/15/18  Yes Taco Carlson MD   losartan (COZAAR) 100 MG tablet Take 1 tablet by mouth daily 1/15/18  Yes Taco Carlson MD   simvastatin (ZOCOR) 20 MG tablet Take 1 Take 10 mg by mouth daily. Indications: Seasonal Allergy   Yes Historical Provider, MD   aspirin 81 MG tablet Take 81 mg by mouth daily. Yes Historical Provider, MD   methylfolate (DEPLIN) 7.5 MG TABS tablet Take 1 tablet by mouth daily  Patient taking differently: Take 1 tablet by mouth daily 01-18-18 verbal per pt-she is not currently taking this medication yet. She said it was too expensive as a prescription. She is going to get it over the counter. 1/15/18   Nakul Guillermo MD     Allergies: Augmentin [amoxicillin-pot clavulanate]; Ciprofloxacin; and Sulfa antibiotics    Review of Systems Physical Exam   Review of Systems   Constitutional: Positive for malaise/fatigue. Negative for fever and weight loss. HENT: Negative. Eyes: Positive for blurred vision. Respiratory: Negative for cough and shortness of breath. Cardiovascular: Negative for chest pain and leg swelling. Gastrointestinal: Positive for constipation. Negative for abdominal pain, diarrhea, heartburn, nausea and vomiting. Genitourinary: Negative for dysuria, frequency and hematuria. Musculoskeletal: Positive for neck pain. Negative for back pain and joint pain. Skin: Positive for itching. Negative for rash. Neurological: Negative for dizziness, focal weakness, weakness and headaches. Psychiatric/Behavioral: Positive for depression. The patient does not have insomnia. Physical Exam   Constitutional: She is oriented to person, place, and time and well-developed, well-nourished, and in no distress. No distress. HENT:   Right Ear: External ear normal.   Left Ear: External ear normal.   Nose: Nose normal.   Mouth/Throat: Oropharynx is clear and moist.   Eyes: Conjunctivae are normal. Right eye exhibits no discharge. Left eye exhibits no discharge. No scleral icterus. Neck: Normal range of motion. Neck supple. No JVD present. No thyromegaly present. Cardiovascular: Normal rate, regular rhythm and normal heart sounds. No murmur heard. Pulmonary/Chest: Effort normal and breath sounds normal. No stridor. No respiratory distress. She has no rales. She exhibits no tenderness. Abdominal: Soft. Bowel sounds are normal. There is no tenderness. Musculoskeletal: She exhibits no edema or tenderness. Neurological: She is alert and oriented to person, place, and time. Skin: Skin is warm and dry. No rash noted. She is not diaphoretic. No erythema. Psychiatric: Mood, memory and affect normal.   Vitals reviewed. Vitals   Vitals:    01/18/18 1120   BP: 124/64   Site: Right Arm   Position: Sitting   Cuff Size: Small Adult   Weight: 141 lb (64 kg)        Labs, Radiology and Tests :    Labs -    1/18           Potassium 4.8           BUN 37           Creatinine 1.8           eGFR 28                       [facility]                          Renal Ultrasound Scan -- will order       Other : old  lab data and PCP notes have been reviewed and noted patient's creatinine was around 1.5 in 2016 and in 2014 it was 0.7  A CT scan which was done in 2014 which was negative for any abnormalities of the kidney. Assessment    1. Renal - as per MDRD patient's GFR is around 28 ML/minute which places her in chronic kidney disease stage IV this time  - She definitely seems to have some component of chronic kidney disease with baseline creatinine of 1.5 in 2016  - I cannot rule out an acute component at this time. Her sodium is slightly higher. May be some component of volume depletion  - I have advised to patient to drink at least 20-30 ounces more fluids than what she is taking currently. We'll stop the Cozaar at this time. - We will repeat a BMP in 2 weeks along with urine electrolytes urine protein and urine creatinine and so we will also obtain a renal ultrasound scan. 2. Essential hypertension running well however I'm holding the Cozaar at this time we'll start the patient on Norvasc 10 mg by mouth daily for now.   3. Mild hyponatremia may be secondary to volume depletion plan as mentioned above. 4. Diabetes mellitus with last A1c of 4.9. She is currently on metformin. Okay to continue for now. If the renal function maintains the same and the GFR is less than 30 in the next visit we need to consider changing metformin. 5. History of stroke in 2013  6. Medications reviewed discussed with the patient and  in detail. 7. Follow-up in 2 weeks. Plan     Orders Placed This Encounter   Procedures    US Renal Kidney    Basic Metabolic Panel    Urinalysis with Microscopic    Creatinine, Random Urine    MICROALBUMIN, RANDOM URINE (W/O CREATININE)    Protein, urine, random    CBC       Adan Carbajal M.D  [facility].

## 2018-01-22 ENCOUNTER — HOSPITAL ENCOUNTER (OUTPATIENT)
Dept: ULTRASOUND IMAGING | Age: 63
Discharge: HOME | End: 2018-01-22
Payer: COMMERCIAL

## 2018-01-22 DIAGNOSIS — I10 ESSENTIAL HYPERTENSION: Chronic | ICD-10-CM

## 2018-01-22 DIAGNOSIS — N18.4 CKD (CHRONIC KIDNEY DISEASE), STAGE IV (HCC): ICD-10-CM

## 2018-01-22 PROCEDURE — 76770 US EXAM ABDO BACK WALL COMP: CPT

## 2018-01-23 ENCOUNTER — TELEPHONE (OUTPATIENT)
Dept: NEPHROLOGY | Age: 63
End: 2018-01-23

## 2018-01-25 ENCOUNTER — OFFICE VISIT (OUTPATIENT)
Dept: FAMILY MEDICINE CLINIC | Age: 63
End: 2018-01-25
Payer: COMMERCIAL

## 2018-01-25 ENCOUNTER — HOSPITAL ENCOUNTER (OUTPATIENT)
Age: 63
Discharge: HOME | End: 2018-01-25
Payer: COMMERCIAL

## 2018-01-25 VITALS
HEIGHT: 60 IN | HEART RATE: 62 BPM | DIASTOLIC BLOOD PRESSURE: 70 MMHG | SYSTOLIC BLOOD PRESSURE: 110 MMHG | BODY MASS INDEX: 27.68 KG/M2 | WEIGHT: 141 LBS

## 2018-01-25 DIAGNOSIS — I10 ESSENTIAL HYPERTENSION: Chronic | ICD-10-CM

## 2018-01-25 DIAGNOSIS — N18.4 CKD (CHRONIC KIDNEY DISEASE), STAGE IV (HCC): ICD-10-CM

## 2018-01-25 DIAGNOSIS — R21 RASH AND NONSPECIFIC SKIN ERUPTION: Primary | ICD-10-CM

## 2018-01-25 LAB
ANION GAP SERPL CALCULATED.3IONS-SCNC: 13 MEQ/L (ref 8–16)
BACTERIA: ABNORMAL
BILIRUBIN URINE: NEGATIVE
BLOOD, URINE: ABNORMAL
BUN BLDV-MCNC: 30 MG/DL (ref 7–22)
CALCIUM SERPL-MCNC: 9.4 MG/DL (ref 8.5–10.5)
CASTS: ABNORMAL /LPF
CASTS: ABNORMAL /LPF
CHARACTER, URINE: ABNORMAL
CHLORIDE BLD-SCNC: 102 MEQ/L (ref 98–111)
CO2: 26 MEQ/L (ref 23–33)
COLOR: YELLOW
CREAT SERPL-MCNC: 1.6 MG/DL (ref 0.4–1.2)
CREATININE URINE: 116.1 MG/DL
CREATININE, URINE: 116.1 MG/DL
CRYSTALS: ABNORMAL
EPITHELIAL CELLS, UA: ABNORMAL /HPF
GFR SERPL CREATININE-BSD FRML MDRD: 32 ML/MIN/1.73M2
GLUCOSE BLD-MCNC: 90 MG/DL (ref 70–108)
GLUCOSE, URINE: NEGATIVE MG/DL
HCT VFR BLD CALC: 29.5 % (ref 37–47)
HEMOGLOBIN: 9.9 GM/DL (ref 12–16)
KETONES, URINE: NEGATIVE
LEUKOCYTE ESTERASE, URINE: ABNORMAL
MCH RBC QN AUTO: 32.2 PG (ref 27–31)
MCHC RBC AUTO-ENTMCNC: 33.5 GM/DL (ref 33–37)
MCV RBC AUTO: 96.1 FL (ref 81–99)
MICROALBUMIN UR-MCNC: 1.48 MG/DL
MICROALBUMIN/CREAT UR-RTO: 13 MG/G (ref 0–30)
MISCELLANEOUS LAB TEST RESULT: ABNORMAL
NITRITE, URINE: POSITIVE
PDW BLD-RTO: 13.9 % (ref 11.5–14.5)
PH UA: 5.5
PLATELET # BLD: 212 THOU/MM3 (ref 130–400)
PMV BLD AUTO: 9.1 MCM (ref 7.4–10.4)
POTASSIUM SERPL-SCNC: 4.8 MEQ/L (ref 3.5–5.2)
PROTEIN UA: NEGATIVE MG/DL
PROTEIN, URINE: 16.8 MG/DL
RBC # BLD: 3.07 MILL/MM3 (ref 4.2–5.4)
RBC URINE: ABNORMAL /HPF
RENAL EPITHELIAL, UA: ABNORMAL
SODIUM BLD-SCNC: 141 MEQ/L (ref 135–145)
SPECIFIC GRAVITY UA: 1.02 (ref 1–1.03)
UROBILINOGEN, URINE: 0.2 EU/DL
WBC # BLD: 4.6 THOU/MM3 (ref 4.8–10.8)
WBC UA: ABNORMAL /HPF
YEAST: ABNORMAL

## 2018-01-25 PROCEDURE — 84156 ASSAY OF PROTEIN URINE: CPT

## 2018-01-25 PROCEDURE — 81001 URINALYSIS AUTO W/SCOPE: CPT

## 2018-01-25 PROCEDURE — 82570 ASSAY OF URINE CREATININE: CPT

## 2018-01-25 PROCEDURE — 85027 COMPLETE CBC AUTOMATED: CPT

## 2018-01-25 PROCEDURE — 82043 UR ALBUMIN QUANTITATIVE: CPT

## 2018-01-25 PROCEDURE — 80048 BASIC METABOLIC PNL TOTAL CA: CPT

## 2018-01-25 PROCEDURE — 99213 OFFICE O/P EST LOW 20 MIN: CPT | Performed by: FAMILY MEDICINE

## 2018-01-25 PROCEDURE — 36415 COLL VENOUS BLD VENIPUNCTURE: CPT

## 2018-01-25 RX ORDER — PREDNISONE 20 MG/1
40 TABLET ORAL DAILY
Qty: 10 TABLET | Refills: 0 | Status: SHIPPED | OUTPATIENT
Start: 2018-01-25 | End: 2018-03-01 | Stop reason: ALTCHOICE

## 2018-01-25 ASSESSMENT — ENCOUNTER SYMPTOMS
SHORTNESS OF BREATH: 0
EYE REDNESS: 0
COLOR CHANGE: 0
VOMITING: 0
NAUSEA: 0
COUGH: 0
EYE DISCHARGE: 0

## 2018-01-29 ENCOUNTER — OFFICE VISIT (OUTPATIENT)
Dept: NEPHROLOGY | Age: 63
End: 2018-01-29
Payer: COMMERCIAL

## 2018-01-29 VITALS
SYSTOLIC BLOOD PRESSURE: 144 MMHG | HEART RATE: 75 BPM | DIASTOLIC BLOOD PRESSURE: 82 MMHG | BODY MASS INDEX: 27.42 KG/M2 | OXYGEN SATURATION: 93 % | WEIGHT: 140.4 LBS

## 2018-01-29 DIAGNOSIS — I10 ESSENTIAL HYPERTENSION: Primary | Chronic | ICD-10-CM

## 2018-01-29 DIAGNOSIS — N18.30 CKD (CHRONIC KIDNEY DISEASE) STAGE 3, GFR 30-59 ML/MIN (HCC): ICD-10-CM

## 2018-01-29 PROCEDURE — 99213 OFFICE O/P EST LOW 20 MIN: CPT | Performed by: INTERNAL MEDICINE

## 2018-01-29 RX ORDER — AMLODIPINE BESYLATE 10 MG/1
10 TABLET ORAL DAILY
Qty: 30 TABLET | Refills: 0 | Status: SHIPPED | OUTPATIENT
Start: 2018-01-29 | End: 2018-03-12 | Stop reason: SDUPTHER

## 2018-02-09 ENCOUNTER — TELEPHONE (OUTPATIENT)
Dept: FAMILY MEDICINE CLINIC | Age: 63
End: 2018-02-09

## 2018-02-22 DIAGNOSIS — K58.9 IRRITABLE BOWEL SYNDROME WITHOUT DIARRHEA: ICD-10-CM

## 2018-02-22 DIAGNOSIS — E11.9 TYPE 2 DIABETES MELLITUS WITHOUT COMPLICATION, WITHOUT LONG-TERM CURRENT USE OF INSULIN (HCC): ICD-10-CM

## 2018-02-22 RX ORDER — CHLORDIAZEPOXIDE HYDROCHLORIDE AND CLIDINIUM BROMIDE 5; 2.5 MG/1; MG/1
CAPSULE ORAL
Qty: 180 CAPSULE | Refills: 0 | Status: SHIPPED | OUTPATIENT
Start: 2018-02-22 | End: 2018-03-01 | Stop reason: SDUPTHER

## 2018-02-22 RX ORDER — CHLORDIAZEPOXIDE HYDROCHLORIDE AND CLIDINIUM BROMIDE 5; 2.5 MG/1; MG/1
CAPSULE ORAL
Qty: 180 CAPSULE | Refills: 0 | Status: SHIPPED | OUTPATIENT
Start: 2018-02-22 | End: 2018-02-22 | Stop reason: SDUPTHER

## 2018-02-22 RX ORDER — PIOGLITAZONEHYDROCHLORIDE 30 MG/1
30 TABLET ORAL DAILY
Qty: 30 TABLET | Refills: 5 | Status: SHIPPED | OUTPATIENT
Start: 2018-02-22 | End: 2018-05-16

## 2018-03-01 ENCOUNTER — OFFICE VISIT (OUTPATIENT)
Dept: FAMILY MEDICINE CLINIC | Age: 63
End: 2018-03-01
Payer: COMMERCIAL

## 2018-03-01 VITALS
BODY MASS INDEX: 26.82 KG/M2 | HEIGHT: 60 IN | DIASTOLIC BLOOD PRESSURE: 82 MMHG | SYSTOLIC BLOOD PRESSURE: 138 MMHG | WEIGHT: 136.6 LBS | TEMPERATURE: 98.5 F | HEART RATE: 76 BPM

## 2018-03-01 DIAGNOSIS — K58.9 IRRITABLE BOWEL SYNDROME WITHOUT DIARRHEA: ICD-10-CM

## 2018-03-01 DIAGNOSIS — J01.90 ACUTE BACTERIAL SINUSITIS: Primary | ICD-10-CM

## 2018-03-01 DIAGNOSIS — B96.89 ACUTE BACTERIAL SINUSITIS: Primary | ICD-10-CM

## 2018-03-01 DIAGNOSIS — R21 RASH: ICD-10-CM

## 2018-03-01 PROCEDURE — 99214 OFFICE O/P EST MOD 30 MIN: CPT | Performed by: FAMILY MEDICINE

## 2018-03-01 RX ORDER — CHLORDIAZEPOXIDE HYDROCHLORIDE AND CLIDINIUM BROMIDE 5; 2.5 MG/1; MG/1
1 CAPSULE ORAL 2 TIMES DAILY PRN
Qty: 180 CAPSULE | Refills: 0 | Status: SHIPPED | OUTPATIENT
Start: 2018-03-01 | End: 2018-05-31 | Stop reason: SDUPTHER

## 2018-03-01 RX ORDER — MUPIROCIN CALCIUM 20 MG/G
CREAM TOPICAL
Qty: 1 TUBE | Refills: 0 | Status: SHIPPED | OUTPATIENT
Start: 2018-03-01 | End: 2018-03-31

## 2018-03-01 RX ORDER — DOXYCYCLINE HYCLATE 100 MG
100 TABLET ORAL 2 TIMES DAILY
Qty: 20 TABLET | Refills: 0 | Status: SHIPPED | OUTPATIENT
Start: 2018-03-01 | End: 2018-03-15 | Stop reason: CLARIF

## 2018-03-01 RX ORDER — LANOLIN ALCOHOL/MO/W.PET/CERES
400 CREAM (GRAM) TOPICAL DAILY
COMMUNITY

## 2018-03-01 NOTE — LETTER
supplements and oral decongestants. Dependence withdrawal symptoms may include depressed mood, loss of interest, suicidal thoughts, anxiety, fatigue, appetite changes and agitation. Testosterone replacement therapy:  Potential side effects include increased risk of stroke and heart attack, blood clots, increased blood pressure, increased cholesterol, enlarged prostate, sleep apnea, irritability/aggression and other mood disorders, and decreased fertility. Other:     1. I understand that I have the following responsibilities:  · I will take medications at the dose and frequency prescribed. · I will not increase or change how I take my medications without the approval of the health care provider who signs this Medication Agreement. · I will arrange for refills at the prescribed interval ONLY during regular office hours. I will not ask for refills earlier than agreed, after-hours, on holidays or on weekends. · I will obtain all refills for these medications at  ·  ___[facility]_______________________  pharmacy (phone number  ·  ________________________), with full consent for my provider and pharmacist to exchange information in writing or verbally. · I will not request any pain medications or controlled substances from other providers and will inform this provider of all other medications I am taking. · I will inform my other health care providers that I am taking these medications and of the existence of this [address]. In the event of an emergency, I will provide the same information to the emergency department providers. · I will protect my prescriptions and medications. I understand that lost or misplaced prescriptions will not be replaced. · I will keep medications only for my own use and will not share them with others. I will keep all medications away from children. · I agree to participate in any medical, psychological or psychiatric assessments recommended by my provider. which may also result in my being prevented from receiving further care from this office. · Other:____________________________________________________________________    AGREEMENT:    I have read the above and have had all of my questions answered. For chronic disease management, I know that my symptoms can be managed with many types of treatments. A chronic medication trial may be part of my treatment, but I must be an active participant in my care. Medication therapy is only one part of my symptom management plan. In some cases, there may be limited scientific evidence to support the chronic use of certain medications to improve symptoms and daily function. Furthermore, in certain circumstances, there may be scientific information that suggests that use of chronic controlled substances may actually worsen my symptoms and increase my risk of unintentional death directly related to this medication therapy. I know that if my provider feels my risk from controlled medications is greater than my benefit, I will have my controlled substance medication(s) compassionately lowered or removed altogether. I agree to a controlled substance medication trial.      I further agree to allow this office to contact family or friends if there are concerns about my safety and use of the controlled medications. I have agreed to use the following medications above as instructed by my physician and as stated in this [address].      Patient Signature:  ______________________  BEATA:6/6/5089 or _____________    Provider Signature:______________________  SOJF:4/4/6801 or _____________

## 2018-03-01 NOTE — PROGRESS NOTES
[facility]  [facility]  [address] [id number]  Dept: [phone]  Dept Fax: [phone]  Loc: [phone]  PROGRESS NOTE      Visit Date: 3/1/2018    Misha Herrera is a 57 y.o. female who presents today for:  Chief Complaint   Patient presents with    Sinusitis    Rash       Subjective:  HPI    Sinus pressure and headache:  Started a few days ago. Requesting antibiotic. Having sore throat and nasal drainage. Rash on chest and back. For 2 months. Seen in dec for this and then by dr. Dylan Ramon in [place]. Has tried otc meds and prednisone. No response to oral prednisone. She has tried [employer]. [employer] products. \"    Needs librax for her intermittent diarrhea which controls her symptoms. Review of Systems   Constitutional: Negative for chills and fever. HENT: Negative for rhinorrhea and sore throat. Psychiatric/Behavioral: Positive for confusion and decreased concentration.      Past Medical History:   Diagnosis Date    Allergic rhinitis     Anxiety     CKD stage 4 due to type 2 diabetes mellitus (HCC)     CVA (cerebral infarction)     Depression     Fibromyalgia     Headache(784.0)     Hyperlipidemia     Hypertension     Irritable bowel syndrome     Type II or unspecified type diabetes mellitus without mention of complication, not stated as uncontrolled     Unspecified cerebral artery occlusion with cerebral infarction     Unspecified sleep apnea       Past Surgical History:   Procedure Laterality Date    CARPAL TUNNEL RELEASE Right     [facility]  2012    [facility]    ENDOSCOPY, COLON, DIAGNOSTIC  unsure    EYE SURGERY      lasik    HEMORRHOID SURGERY  unsure    HYSTERECTOMY      total    NECK SURGERY  18  years ago    fusion    SINUS SURGERY  10 years ago    TUBAL LIGATION       Family History   Problem Relation Age of Onset    Diabetes Mother     High Blood Pressure Mother     Heart Disease Mother     Heart Disease

## 2018-03-02 ENCOUNTER — TELEPHONE (OUTPATIENT)
Dept: FAMILY MEDICINE CLINIC | Age: 63
End: 2018-03-02

## 2018-03-02 ASSESSMENT — ENCOUNTER SYMPTOMS
RHINORRHEA: 0
SORE THROAT: 0

## 2018-03-02 NOTE — TELEPHONE ENCOUNTER
I had received a call from [address] and they state that they are no longer able to supply the Librax, but have a substitute, the generic chlordiazePOXIDE-clidinium 5-2.5 mg. I gave verbal approve for them to substitute this.

## 2018-03-12 ENCOUNTER — OFFICE VISIT (OUTPATIENT)
Dept: PSYCHOLOGY | Age: 63
End: 2018-03-12
Payer: COMMERCIAL

## 2018-03-12 DIAGNOSIS — F33.0 MILD EPISODE OF RECURRENT MAJOR DEPRESSIVE DISORDER (HCC): ICD-10-CM

## 2018-03-12 DIAGNOSIS — K58.9 IRRITABLE BOWEL SYNDROME WITHOUT DIARRHEA: ICD-10-CM

## 2018-03-12 DIAGNOSIS — F41.9 ANXIETY: Primary | ICD-10-CM

## 2018-03-12 PROCEDURE — 90834 PSYTX W PT 45 MINUTES: CPT | Performed by: PSYCHOLOGIST

## 2018-03-12 RX ORDER — CEPHALEXIN 500 MG/1
CAPSULE ORAL
Refills: 0 | COMMUNITY
Start: 2018-03-09 | End: 2018-03-15 | Stop reason: CLARIF

## 2018-03-12 RX ORDER — AMLODIPINE BESYLATE 10 MG/1
10 TABLET ORAL DAILY
Qty: 30 TABLET | Refills: 4 | Status: SHIPPED | OUTPATIENT
Start: 2018-03-12 | End: 2018-05-05 | Stop reason: SDUPTHER

## 2018-03-12 NOTE — Clinical Note
Patient's allergy doc thinks she has possible scabies and wants to put her on Keflex. Crystal Moreland is concerned about whether it is OK for her to take the Keflex, from a renal perspective. Can your office let her know about this? Thanks!  (I see Crystal Moreland from time to time for anxiety and post-CVA adjustment issues. She is doing well, overall, from a psychological perspective. It is now six years since her CVA. )

## 2018-03-12 NOTE — PROGRESS NOTES
[address]  [address]  [address]  Dept: [phone]  Dept Fax: [phone]: [phone]    Patient: Giuseppe Castro  Visit Date: 3/12/2018  Referring Provider:   No ref. provider found    SUBJECTIVE DATA     CHIEF COMPLAINT:    Chief Complaint   Patient presents with    Anxiety    Depression       Patient update:  Has had a very difficult few months. Has worse itching, bruising, and now has learned that her kidneys are significantly compromised. She saw a renal doc recently (Dr. Shimon Mendez), who is overseeing her renal regimen. Very agitated about how many issues she is dealing with (bruising, itching, kidneys, post-CVA. ..)    Upset because she and her  both have ongoing unexplained dermatological issues. They were able to enjoy a visit from their friends Joy Huerta and Derek. Had to go off her Librax because of some insurance issues. Has been doing well with her bowels despite that. No withdrawal  symptoms (but Librium has a long half-life). Anahi Odonnell called Deloris to ask her to call the squad, when she was drunk. The doctor told her to wean off the alcohol, but she insisted she'd go back to her home and so Sandoval Krishnamurthy was terribly anxious afterwards. She sees Melba Judge and her  Faina Charles are falling apart, financially and health-wise. Worries about what is happening to Melba Judge. OBJECTIVE DATA     Physical Exam:    Mental Status Evaluation:   Orientation: Alert, oriented, thought content appropriate   Mood:. Anxious      Affect:  normal and anxious      Appearance:  well dressed and Normal   Activity:  Normal   Gait/Posture: Normal   Speech:  Normal   Thought Process:  within normal limits   Thought Content:   Within Normal Limits   Cognition:  Normal   Memory: intact   Insight:  good   Judgment: fair and Normal   Suicidal Ideations: Denies Suicidal Ideations   Homicidal Ideations: Denies Homicidal Ideations   Medication Side

## 2018-03-12 NOTE — TELEPHONE ENCOUNTER
Patient called back-I told her that her prescription is pending Dr. Kaylene Huber signature-I told her to call the pharmacy first to make sure that the script is there before she picks it up

## 2018-03-12 NOTE — PATIENT INSTRUCTIONS
1. Consider [employer] and/or Monday yoga class. 2. Consider going to [employer] to help with knowing how to interact with your friend.

## 2018-03-15 ENCOUNTER — OFFICE VISIT (OUTPATIENT)
Dept: FAMILY MEDICINE CLINIC | Age: 63
End: 2018-03-15
Payer: COMMERCIAL

## 2018-03-15 VITALS
BODY MASS INDEX: 25.58 KG/M2 | DIASTOLIC BLOOD PRESSURE: 68 MMHG | TEMPERATURE: 98 F | SYSTOLIC BLOOD PRESSURE: 120 MMHG | HEART RATE: 80 BPM | WEIGHT: 131 LBS

## 2018-03-15 DIAGNOSIS — L98.9 SKIN LESION: ICD-10-CM

## 2018-03-15 DIAGNOSIS — J30.89 CHRONIC NON-SEASONAL ALLERGIC RHINITIS, UNSPECIFIED TRIGGER: Chronic | ICD-10-CM

## 2018-03-15 DIAGNOSIS — R11.0 NAUSEA: ICD-10-CM

## 2018-03-15 DIAGNOSIS — K21.9 GASTROESOPHAGEAL REFLUX DISEASE WITHOUT ESOPHAGITIS: Primary | ICD-10-CM

## 2018-03-15 PROCEDURE — 99213 OFFICE O/P EST LOW 20 MIN: CPT | Performed by: FAMILY MEDICINE

## 2018-03-15 RX ORDER — RANITIDINE 150 MG/1
150 TABLET ORAL 2 TIMES DAILY
Qty: 60 TABLET | Refills: 0 | Status: SHIPPED | OUTPATIENT
Start: 2018-03-15 | End: 2018-09-17 | Stop reason: SDUPTHER

## 2018-03-15 NOTE — PROGRESS NOTES
CREA Apply topically 5 times per day (Patient taking differently: Apply topically 5 times per day  01-18-18 per patient. She is going to get this medication over the counter. Too expensive as a prescription. ) 1 Tube 1    losartan (COZAAR) 100 MG tablet Take 1 tablet by mouth daily 90 tablet 1    hydrocortisone (ANUSOL-HC) 25 MG suppository Place 1 suppository rectally every 12 hours 24 suppository 0    loratadine (CLARITIN) 10 MG tablet Take 1 tablet by mouth daily 30 tablet 2    lactulose (CHRONULAC) 10 GM/15ML solution Take 15 mLs by mouth 2 times daily as needed (constipation) 946 mL 1    diclofenac 2 % SOLN Place onto the skin as needed      traMADol (ULTRAM) 50 MG tablet Take 1 tablet by mouth every 6 hours as needed for Pain. (Patient taking differently: Take 50 mg by mouth nightly ) 180 tablet 1     No current facility-administered medications for this visit. Allergies   Allergen Reactions    Augmentin [Amoxicillin-Pot Clavulanate] Diarrhea    Ciprofloxacin      unsure    Sulfa Antibiotics Rash     Health Maintenance   Topic Date Due    Hepatitis C screen  1955    HIV screen  10/11/1968    DTaP/Tdap/Td vaccine (1 - Tdap) 10/11/1972    Pneumococcal highest risk (1 of 3 - PCV13) 10/11/1972    Shingles Vaccine (1 of 2 - 2 Dose Series) 10/11/2003    Diabetic foot exam  10/19/2016    Diabetic retinal exam  12/27/2017    Colon Cancer Screen FIT/FOBT  06/27/2018    A1C test (Diabetic or Prediabetic)  01/16/2019    Lipid screen  01/16/2019    Potassium monitoring  01/25/2019    Creatinine monitoring  01/25/2019    Breast cancer screen  07/21/2019    Flu vaccine  Completed       Objective:  /68 (Site: Left Arm, Position: Sitting, Cuff Size: Medium Adult)   Pulse 80   Temp 98 °F (36.7 °C) (Oral)   Wt 131 lb (59.4 kg)   BMI 25.58 kg/m²   Physical Exam   Constitutional: She appears well-developed and well-nourished. Neurological: She is alert.    Skin:        Psychiatric: Her speech is normal. Her mood appears anxious. Cognition and memory are impaired. Vitals reviewed. Impression/Plan:  1. Gastroesophageal reflux disease without esophagitis  New problem  -start ranitidine (ZANTAC) 150 MG tablet; Take 1 tablet by mouth 2 times daily  Dispense: 60 tablet; Refill: 0    2. Chronic non-seasonal allergic rhinitis, unspecified trigger  Resume zyrtec and claritin. Continue flonase. 3. Skin lesion  Skin tag on back that is irritated. Recommend removal.  She will talk to dermatologist at [facility] about this and other lesions on her. 4. Nausea  May be due to GERD. Treat with zantac.    -A total of at least 15 minutes was spent face-to-face with the patient during this encounter and over 50% of that time was spent on counseling and/or coordination of care. The patient's conditions were thoroughly discussed during the visit today and all questions were answered. They voiced understanding. All questions answered. They agreed with treatment plan. Educational materials given - see patient instructions. Reviewed health maintenance. Return in about 2 months (around 5/15/2018) for chronic problems. stef.       Electronically signed by Guido Torres MD on 3/15/2018 at 1:24 PM

## 2018-03-27 ENCOUNTER — TELEPHONE (OUTPATIENT)
Dept: FAMILY MEDICINE CLINIC | Age: 63
End: 2018-03-27

## 2018-03-27 ENCOUNTER — OFFICE VISIT (OUTPATIENT)
Dept: FAMILY MEDICINE CLINIC | Age: 63
End: 2018-03-27
Payer: COMMERCIAL

## 2018-03-27 ENCOUNTER — HOSPITAL ENCOUNTER (OUTPATIENT)
Age: 63
Discharge: HOME | End: 2018-03-27
Payer: COMMERCIAL

## 2018-03-27 ENCOUNTER — HOSPITAL ENCOUNTER (OUTPATIENT)
Dept: GENERAL RADIOLOGY | Age: 63
Discharge: HOME | End: 2018-03-27
Payer: COMMERCIAL

## 2018-03-27 VITALS
SYSTOLIC BLOOD PRESSURE: 130 MMHG | HEART RATE: 64 BPM | DIASTOLIC BLOOD PRESSURE: 70 MMHG | HEIGHT: 60 IN | WEIGHT: 129 LBS | BODY MASS INDEX: 25.32 KG/M2

## 2018-03-27 DIAGNOSIS — N30.00 ACUTE CYSTITIS WITHOUT HEMATURIA: Primary | ICD-10-CM

## 2018-03-27 DIAGNOSIS — R10.84 GENERALIZED ABDOMINAL PAIN: ICD-10-CM

## 2018-03-27 DIAGNOSIS — R11.15 INTRACTABLE CYCLICAL VOMITING WITH NAUSEA: ICD-10-CM

## 2018-03-27 DIAGNOSIS — R11.15 INTRACTABLE CYCLICAL VOMITING WITH NAUSEA: Primary | ICD-10-CM

## 2018-03-27 DIAGNOSIS — E86.0 MILD DEHYDRATION: ICD-10-CM

## 2018-03-27 DIAGNOSIS — R53.1 WEAKNESS: ICD-10-CM

## 2018-03-27 LAB
ALBUMIN SERPL-MCNC: 4.7 G/DL (ref 3.5–5.1)
ALP BLD-CCNC: 67 U/L (ref 38–126)
ALT SERPL-CCNC: 11 U/L (ref 11–66)
ANION GAP SERPL CALCULATED.3IONS-SCNC: 14 MEQ/L (ref 8–16)
AST SERPL-CCNC: 18 U/L (ref 5–40)
BACTERIA: ABNORMAL /HPF
BASOPHILS # BLD: 0.7 %
BASOPHILS ABSOLUTE: 0 THOU/MM3 (ref 0–0.1)
BILIRUB SERPL-MCNC: 0.2 MG/DL (ref 0.3–1.2)
BILIRUBIN URINE: NEGATIVE
BLOOD, URINE: NEGATIVE
BUN BLDV-MCNC: 43 MG/DL (ref 7–22)
CALCIUM SERPL-MCNC: 9.9 MG/DL (ref 8.5–10.5)
CASTS 2: ABNORMAL /LPF
CASTS UA: ABNORMAL /LPF
CHARACTER, URINE: ABNORMAL
CHLORIDE BLD-SCNC: 105 MEQ/L (ref 98–111)
CO2: 26 MEQ/L (ref 23–33)
COLOR: YELLOW
CREAT SERPL-MCNC: 2.3 MG/DL (ref 0.4–1.2)
CRYSTALS, UA: ABNORMAL
EOSINOPHIL # BLD: 3.2 %
EOSINOPHILS ABSOLUTE: 0.2 THOU/MM3 (ref 0–0.4)
EPITHELIAL CELLS, UA: ABNORMAL /HPF
GFR SERPL CREATININE-BSD FRML MDRD: 21 ML/MIN/1.73M2
GLUCOSE BLD-MCNC: 100 MG/DL (ref 70–108)
GLUCOSE URINE: NEGATIVE MG/DL
HCT VFR BLD CALC: 32.7 % (ref 37–47)
HEMOGLOBIN: 10.6 GM/DL (ref 12–16)
KETONES, URINE: ABNORMAL
LEUKOCYTE ESTERASE, URINE: ABNORMAL
LIPASE: 22.9 U/L (ref 5.6–51.3)
LYMPHOCYTES # BLD: 25.5 %
LYMPHOCYTES ABSOLUTE: 1.6 THOU/MM3 (ref 1–4.8)
MCH RBC QN AUTO: 30.8 PG (ref 27–31)
MCHC RBC AUTO-ENTMCNC: 32.3 GM/DL (ref 33–37)
MCV RBC AUTO: 95.3 FL (ref 81–99)
MISCELLANEOUS 2: ABNORMAL
MONOCYTES # BLD: 7.7 %
MONOCYTES ABSOLUTE: 0.5 THOU/MM3 (ref 0.4–1.3)
NITRITE, URINE: POSITIVE
NUCLEATED RED BLOOD CELLS: 0 /100 WBC
PDW BLD-RTO: 13.4 % (ref 11.5–14.5)
PH UA: 5
PLATELET # BLD: 231 THOU/MM3 (ref 130–400)
PMV BLD AUTO: 9.4 FL (ref 7.4–10.4)
POTASSIUM SERPL-SCNC: 4.8 MEQ/L (ref 3.5–5.2)
PROTEIN UA: NEGATIVE
RBC # BLD: 3.43 MILL/MM3 (ref 4.2–5.4)
RBC URINE: ABNORMAL /HPF
RENAL EPITHELIAL, UA: ABNORMAL
SEG NEUTROPHILS: 62.9 %
SEGMENTED NEUTROPHILS ABSOLUTE COUNT: 3.8 THOU/MM3 (ref 1.8–7.7)
SODIUM BLD-SCNC: 145 MEQ/L (ref 135–145)
SPECIFIC GRAVITY, URINE: 1.02 (ref 1–1.03)
TOTAL PROTEIN: 7.3 G/DL (ref 6.1–8)
UROBILINOGEN, URINE: 0.2 EU/DL
WBC # BLD: 6.1 THOU/MM3 (ref 4.8–10.8)
WBC UA: ABNORMAL /HPF
YEAST: ABNORMAL

## 2018-03-27 PROCEDURE — 85025 COMPLETE CBC W/AUTO DIFF WBC: CPT

## 2018-03-27 PROCEDURE — 36415 COLL VENOUS BLD VENIPUNCTURE: CPT

## 2018-03-27 PROCEDURE — 74018 RADEX ABDOMEN 1 VIEW: CPT

## 2018-03-27 PROCEDURE — 87077 CULTURE AEROBIC IDENTIFY: CPT

## 2018-03-27 PROCEDURE — 99214 OFFICE O/P EST MOD 30 MIN: CPT | Performed by: FAMILY MEDICINE

## 2018-03-27 PROCEDURE — 81001 URINALYSIS AUTO W/SCOPE: CPT

## 2018-03-27 PROCEDURE — 80053 COMPREHEN METABOLIC PANEL: CPT

## 2018-03-27 PROCEDURE — 87186 SC STD MICRODIL/AGAR DIL: CPT

## 2018-03-27 PROCEDURE — 83690 ASSAY OF LIPASE: CPT

## 2018-03-27 PROCEDURE — 87184 SC STD DISK METHOD PER PLATE: CPT

## 2018-03-27 PROCEDURE — 87086 URINE CULTURE/COLONY COUNT: CPT

## 2018-03-27 RX ORDER — NITROFURANTOIN 25; 75 MG/1; MG/1
100 CAPSULE ORAL 2 TIMES DAILY
Qty: 10 CAPSULE | Refills: 0 | Status: SHIPPED | OUTPATIENT
Start: 2018-03-27 | End: 2018-04-17 | Stop reason: SDUPTHER

## 2018-03-27 RX ORDER — ONDANSETRON 4 MG/1
4 TABLET, ORALLY DISINTEGRATING ORAL EVERY 8 HOURS PRN
Qty: 20 TABLET | Refills: 0 | Status: SHIPPED | OUTPATIENT
Start: 2018-03-27 | End: 2018-04-03 | Stop reason: SDUPTHER

## 2018-03-27 ASSESSMENT — ENCOUNTER SYMPTOMS
CONSTIPATION: 1
CHEST TIGHTNESS: 0
SORE THROAT: 0
DIARRHEA: 0
COUGH: 0
COLOR CHANGE: 0
NAUSEA: 1
EYE REDNESS: 0
EYE DISCHARGE: 0
VOMITING: 1
SWOLLEN GLANDS: 0
ABDOMINAL PAIN: 1

## 2018-03-27 NOTE — TELEPHONE ENCOUNTER
----- Message from Saul Garcia MD sent at 3/27/2018  1:02 PM EDT -----  Please let Ayesha Gonzalez know that the x-ray revealed a mild ileus and a large amount of stool in the colon consistent with constipation. She can try Miralax to help with constipation and should contact our office if her symptoms worsen or do not improve.

## 2018-03-27 NOTE — PROGRESS NOTES
pain, chills, congestion, coughing, fever, rash, sore throat, swollen glands or urinary symptoms. The symptoms are aggravated by eating and drinking. She has tried rest and sleep for the symptoms. The treatment provided no relief. Current Outpatient Prescriptions:     ondansetron (ZOFRAN ODT) 4 MG disintegrating tablet, Take 1 tablet by mouth every 8 hours as needed for Nausea or Vomiting, Disp: 20 tablet, Rfl: 0    ranitidine (ZANTAC) 150 MG tablet, Take 1 tablet by mouth 2 times daily, Disp: 60 tablet, Rfl: 0    amLODIPine (NORVASC) 10 MG tablet, TAKE 1 TABLET BY MOUTH DAILY, Disp: 30 tablet, Rfl: 4    folic acid (FOLVITE) 235 MCG tablet, Take 400 mcg by mouth daily, Disp: , Rfl:     mupirocin (BACTROBAN) 2 % cream, Apply 3 times daily. , Disp: 1 Tube, Rfl: 0    chlordiazePOXIDE-clidinium (LIBRAX) 5-2.5 MG per capsule, Take 1 capsule by mouth 2 times daily as needed (pain) for up to 90 days. , Disp: 180 capsule, Rfl: 0    pioglitazone (ACTOS) 30 MG tablet, Take 1 tablet by mouth daily, Disp: 30 tablet, Rfl: 5    acyclovir (ZOVIRAX) 5 % CREA, Apply topically 5 times per day (Patient taking differently: Apply topically 5 times per day 01-18-18 per patient. She is going to get this medication over the counter.   Too expensive as a prescription.), Disp: 1 Tube, Rfl: 1    linaclotide (LINZESS) 145 MCG capsule, Take 2 capsules by mouth daily as needed (constipation), Disp: 30 capsule, Rfl: 3    losartan (COZAAR) 100 MG tablet, Take 1 tablet by mouth daily, Disp: 90 tablet, Rfl: 1    simvastatin (ZOCOR) 20 MG tablet, Take 1 tablet by mouth daily, Disp: 90 tablet, Rfl: 1    buPROPion (WELLBUTRIN SR) 150 MG extended release tablet, Take 1 tablet by mouth 2 times daily, Disp: 180 tablet, Rfl: 1    clopidogrel (PLAVIX) 75 MG tablet, Take 1 tablet by mouth daily, Disp: 90 tablet, Rfl: 1    traZODone (DESYREL) 50 MG tablet, Take 1 tablet by mouth nightly, Disp: 90 tablet, Rfl: 1    metFORMIN (GLUCOPHAGE) 1000 MG tablet, Take 1 tablet by mouth 2 times daily (with meals), Disp: 60 tablet, Rfl: 5    triamcinolone (KENALOG) 0.1 % cream, Apply topically 2 times daily. , Disp: 45 g, Rfl: 1    hydrocortisone (ANUSOL-HC) 25 MG suppository, Place 1 suppository rectally every 12 hours, Disp: 24 suppository, Rfl: 0    loratadine (CLARITIN) 10 MG tablet, Take 1 tablet by mouth daily, Disp: 30 tablet, Rfl: 2    lactulose (CHRONULAC) 10 GM/15ML solution, Take 15 mLs by mouth 2 times daily as needed (constipation), Disp: 946 mL, Rfl: 1    fluticasone (FLONASE) 50 MCG/ACT nasal spray, One spray in each nostril q hs, Disp: 3 Bottle, Rfl: 1    b complex vitamins capsule, Take 1 capsule by mouth daily, Disp: 100 capsule, Rfl: 3    diclofenac 2 % SOLN, Place onto the skin as needed, Disp: , Rfl:     traMADol (ULTRAM) 50 MG tablet, Take 1 tablet by mouth every 6 hours as needed for Pain. (Patient taking differently: Take 50 mg by mouth nightly ), Disp: 180 tablet, Rfl: 1    Probiotic Product (PROBIOTIC DAILY PO), Take  by mouth., Disp: , Rfl:     FISH OIL, 1,000 mg by Does not apply route 2 times daily Indications: Decreased Energy , Disp: , Rfl:     cetirizine (ZYRTEC) 10 MG tablet, Take 10 mg by mouth daily. Indications: Seasonal Allergy, Disp: , Rfl:     aspirin 81 MG tablet, Take 81 mg by mouth daily. , Disp: , Rfl:     Allergies   Allergen Reactions    Augmentin [Amoxicillin-Pot Clavulanate] Diarrhea    Ciprofloxacin      unsure    Sulfa Antibiotics Rash       Subjective:      Review of Systems   Constitutional: Positive for weight loss. Negative for chills and fever. HENT: Negative for congestion and sore throat. Eyes: Negative for discharge and redness. Respiratory: Negative for cough and chest tightness. Cardiovascular: Negative for chest pain and palpitations. Gastrointestinal: Positive for abdominal pain, anorexia, constipation, nausea and vomiting. Negative for diarrhea.    Skin: Negative for color change and rash. Neurological: Positive for weakness and headaches. Hematological: Negative for adenopathy. Does not bruise/bleed easily. Objective:     /70 (Site: Left Arm, Position: Sitting, Cuff Size: Large Adult)   Pulse 64   Ht 5' (1.524 m)   Wt 129 lb (58.5 kg)   BMI 25.19 kg/m²     Physical Exam   Constitutional: She is oriented to person, place, and time. She appears well-developed and well-nourished. No distress. HENT:   Head: Normocephalic and atraumatic. Nose: Nose normal.   Eyes: Conjunctivae and EOM are normal.   Neck: Normal range of motion. Neck supple. Cardiovascular: Normal rate and regular rhythm. Pulmonary/Chest: Effort normal and breath sounds normal. No respiratory distress. She has no wheezes. Abdominal: Soft. Bowel sounds are normal. She exhibits no distension. There is tenderness (mild diffusely). There is no rebound and no guarding. Neurological: She is alert and oriented to person, place, and time. Skin: Skin is warm and dry. No rash noted. No erythema. Psychiatric: She has a normal mood and affect. Her behavior is normal.   Vitals reviewed. Assessment/Plan: [facility] was seen today for abdominal pain. Diagnoses and all orders for this visit:    Intractable cyclical vomiting with nausea  -     Comprehensive Metabolic Panel; Future  -     CBC Auto Differential; Future  -     Lipase; Future  -     XR ABDOMEN (KUB) (SINGLE AP VIEW); Future  -     ondansetron (ZOFRAN ODT) 4 MG disintegrating tablet; Take 1 tablet by mouth every 8 hours as needed for Nausea or Vomiting  -     Urinalysis Reflex to Culture; Future    Generalized abdominal pain  -     Comprehensive Metabolic Panel; Future  -     CBC Auto Differential; Future  -     Lipase; Future  -     XR ABDOMEN (KUB) (SINGLE AP VIEW); Future  -     Urinalysis Reflex to Culture;  Future    Mild dehydration    Weakness    The etiology of the patient's abdominal pain, nausea, and vomiting is unclear and may be something simple like gastroenteritis, but there is concern for potential bowel obstruction as she is feeling constipated and has been unable to keep much down in the past week. Abdominal x-ray ordered for further evaluation, as well as labs. Rx for Zofran given. Patient was encouraged to continue to sip at water and if she can't keep anything down by tomorrow to go to the urgent care for IVF. Return in about 1 week (around 4/3/2018) for abdominal pain.     Electronically signed by Montserrat London MD on 3/27/2018 at 10:57 AM

## 2018-03-27 NOTE — TELEPHONE ENCOUNTER
Spoke with the patient regarding the xray done  Patient has constipation -recommended patient to use some Miralax over the counter to clean out the colon   Patient agreeable

## 2018-03-29 DIAGNOSIS — N30.00 ACUTE CYSTITIS WITHOUT HEMATURIA: Primary | ICD-10-CM

## 2018-03-29 LAB
ORGANISM: ABNORMAL
URINE CULTURE REFLEX: ABNORMAL

## 2018-03-29 RX ORDER — DOXYCYCLINE HYCLATE 100 MG
100 TABLET ORAL 2 TIMES DAILY
Qty: 20 TABLET | Refills: 0 | Status: SHIPPED | OUTPATIENT
Start: 2018-03-29 | End: 2018-04-08

## 2018-04-03 ENCOUNTER — TELEPHONE (OUTPATIENT)
Dept: FAMILY MEDICINE CLINIC | Age: 63
End: 2018-04-03

## 2018-04-03 ENCOUNTER — OFFICE VISIT (OUTPATIENT)
Dept: FAMILY MEDICINE CLINIC | Age: 63
End: 2018-04-03
Payer: COMMERCIAL

## 2018-04-03 ENCOUNTER — HOSPITAL ENCOUNTER (OUTPATIENT)
Age: 63
Discharge: HOME | End: 2018-04-03
Payer: COMMERCIAL

## 2018-04-03 VITALS
WEIGHT: 136 LBS | HEART RATE: 70 BPM | DIASTOLIC BLOOD PRESSURE: 74 MMHG | HEIGHT: 60 IN | SYSTOLIC BLOOD PRESSURE: 126 MMHG | BODY MASS INDEX: 26.7 KG/M2

## 2018-04-03 DIAGNOSIS — R79.89 ELEVATED SERUM CREATININE: ICD-10-CM

## 2018-04-03 DIAGNOSIS — R10.32 LEFT LOWER QUADRANT PAIN: ICD-10-CM

## 2018-04-03 DIAGNOSIS — K58.9 IRRITABLE BOWEL SYNDROME WITHOUT DIARRHEA: ICD-10-CM

## 2018-04-03 DIAGNOSIS — K58.1 IRRITABLE BOWEL SYNDROME WITH CONSTIPATION: Primary | ICD-10-CM

## 2018-04-03 DIAGNOSIS — R11.15 INTRACTABLE CYCLICAL VOMITING WITH NAUSEA: ICD-10-CM

## 2018-04-03 LAB
ANION GAP SERPL CALCULATED.3IONS-SCNC: 20 MEQ/L (ref 8–16)
BUN BLDV-MCNC: 44 MG/DL (ref 7–22)
CALCIUM SERPL-MCNC: 9.7 MG/DL (ref 8.5–10.5)
CHLORIDE BLD-SCNC: 106 MEQ/L (ref 98–111)
CO2: 21 MEQ/L (ref 23–33)
CREAT SERPL-MCNC: 2.2 MG/DL (ref 0.4–1.2)
GFR SERPL CREATININE-BSD FRML MDRD: 22 ML/MIN/1.73M2
GLUCOSE BLD-MCNC: 95 MG/DL (ref 70–108)
POTASSIUM SERPL-SCNC: 5 MEQ/L (ref 3.5–5.2)
SODIUM BLD-SCNC: 147 MEQ/L (ref 135–145)

## 2018-04-03 PROCEDURE — 80048 BASIC METABOLIC PNL TOTAL CA: CPT

## 2018-04-03 PROCEDURE — 36415 COLL VENOUS BLD VENIPUNCTURE: CPT

## 2018-04-03 PROCEDURE — 99214 OFFICE O/P EST MOD 30 MIN: CPT | Performed by: FAMILY MEDICINE

## 2018-04-03 RX ORDER — ONDANSETRON 4 MG/1
4 TABLET, ORALLY DISINTEGRATING ORAL EVERY 8 HOURS PRN
Qty: 20 TABLET | Refills: 0 | Status: SHIPPED | OUTPATIENT
Start: 2018-04-03 | End: 2018-05-16

## 2018-04-03 RX ORDER — LACTULOSE 10 G/15ML
10 SOLUTION ORAL 2 TIMES DAILY PRN
Qty: 946 ML | Refills: 1 | Status: SHIPPED | OUTPATIENT
Start: 2018-04-03 | End: 2019-01-15

## 2018-04-03 ASSESSMENT — ENCOUNTER SYMPTOMS
RHINORRHEA: 0
VOMITING: 0
ABDOMINAL PAIN: 1
CHEST TIGHTNESS: 0
SHORTNESS OF BREATH: 0
EYE DISCHARGE: 0
NAUSEA: 1
COLOR CHANGE: 0
CONSTIPATION: 1
EYE REDNESS: 0

## 2018-04-06 ENCOUNTER — TELEPHONE (OUTPATIENT)
Dept: FAMILY MEDICINE CLINIC | Age: 63
End: 2018-04-06

## 2018-04-06 ENCOUNTER — APPOINTMENT (OUTPATIENT)
Dept: GENERAL RADIOLOGY | Age: 63
End: 2018-04-06
Payer: COMMERCIAL

## 2018-04-06 ENCOUNTER — HOSPITAL ENCOUNTER (EMERGENCY)
Age: 63
Discharge: HOME | End: 2018-04-06
Attending: EMERGENCY MEDICINE
Payer: COMMERCIAL

## 2018-04-06 VITALS
RESPIRATION RATE: 15 BRPM | OXYGEN SATURATION: 98 % | SYSTOLIC BLOOD PRESSURE: 130 MMHG | BODY MASS INDEX: 27.48 KG/M2 | HEIGHT: 60 IN | DIASTOLIC BLOOD PRESSURE: 84 MMHG | WEIGHT: 140 LBS | HEART RATE: 85 BPM | TEMPERATURE: 97.6 F

## 2018-04-06 DIAGNOSIS — D64.9 ANEMIA, UNSPECIFIED TYPE: ICD-10-CM

## 2018-04-06 DIAGNOSIS — K59.04 CHRONIC IDIOPATHIC CONSTIPATION: Primary | ICD-10-CM

## 2018-04-06 DIAGNOSIS — N39.0 URINARY TRACT INFECTION WITHOUT HEMATURIA, SITE UNSPECIFIED: ICD-10-CM

## 2018-04-06 LAB
ALBUMIN SERPL-MCNC: 4.2 G/DL (ref 3.5–5.1)
ALP BLD-CCNC: 63 U/L (ref 38–126)
ALT SERPL-CCNC: 12 U/L (ref 11–66)
ANION GAP SERPL CALCULATED.3IONS-SCNC: 13 MEQ/L (ref 8–16)
ANISOCYTOSIS: ABNORMAL
AST SERPL-CCNC: 18 U/L (ref 5–40)
BACTERIA: ABNORMAL /HPF
BASOPHILS # BLD: 0.3 %
BASOPHILS ABSOLUTE: 0 THOU/MM3 (ref 0–0.1)
BILIRUB SERPL-MCNC: 0.2 MG/DL (ref 0.3–1.2)
BILIRUBIN URINE: NEGATIVE
BLOOD, URINE: NEGATIVE
BUN BLDV-MCNC: 41 MG/DL (ref 7–22)
CALCIUM SERPL-MCNC: 9.7 MG/DL (ref 8.5–10.5)
CASTS 2: ABNORMAL /LPF
CASTS UA: ABNORMAL /LPF
CHARACTER, URINE: ABNORMAL
CHLORIDE BLD-SCNC: 103 MEQ/L (ref 98–111)
CO2: 26 MEQ/L (ref 23–33)
COLOR: YELLOW
CREAT SERPL-MCNC: 1.7 MG/DL (ref 0.4–1.2)
CRYSTALS, UA: ABNORMAL
EOSINOPHIL # BLD: 2 %
EOSINOPHILS ABSOLUTE: 0.1 THOU/MM3 (ref 0–0.4)
EPITHELIAL CELLS, UA: ABNORMAL /HPF
GFR SERPL CREATININE-BSD FRML MDRD: 30 ML/MIN/1.73M2
GLUCOSE BLD-MCNC: 94 MG/DL (ref 70–108)
GLUCOSE URINE: NEGATIVE MG/DL
HCT VFR BLD CALC: 30.5 % (ref 37–47)
HEMOCCULT STL QL: NEGATIVE
HEMOGLOBIN: 9.9 GM/DL (ref 12–16)
KETONES, URINE: NEGATIVE
LEUKOCYTE ESTERASE, URINE: ABNORMAL
LYMPHOCYTES # BLD: 34 %
LYMPHOCYTES ABSOLUTE: 1.9 THOU/MM3 (ref 1–4.8)
MCH RBC QN AUTO: 30.5 PG (ref 27–31)
MCHC RBC AUTO-ENTMCNC: 32.6 GM/DL (ref 33–37)
MCV RBC AUTO: 93.5 FL (ref 81–99)
MISCELLANEOUS 2: ABNORMAL
MONOCYTES # BLD: 8.8 %
MONOCYTES ABSOLUTE: 0.5 THOU/MM3 (ref 0.4–1.3)
NITRITE, URINE: NEGATIVE
NUCLEATED RED BLOOD CELLS: 0 /100 WBC
OSMOLALITY CALCULATION: 293 MOSMOL/KG (ref 275–300)
PDW BLD-RTO: 14.5 % (ref 11.5–14.5)
PH UA: 5
PLATELET # BLD: 225 THOU/MM3 (ref 130–400)
PMV BLD AUTO: 8.7 FL (ref 7.4–10.4)
POTASSIUM SERPL-SCNC: 4.1 MEQ/L (ref 3.5–5.2)
PROTEIN UA: NEGATIVE
RBC # BLD: 3.26 MILL/MM3 (ref 4.2–5.4)
RBC URINE: ABNORMAL /HPF
RENAL EPITHELIAL, UA: ABNORMAL
SEG NEUTROPHILS: 54.9 %
SEGMENTED NEUTROPHILS ABSOLUTE COUNT: 3.1 THOU/MM3 (ref 1.8–7.7)
SODIUM BLD-SCNC: 142 MEQ/L (ref 135–145)
SPECIFIC GRAVITY, URINE: 1.01 (ref 1–1.03)
TOTAL PROTEIN: 6.6 G/DL (ref 6.1–8)
TSH SERPL DL<=0.05 MIU/L-ACNC: 1.55 UIU/ML (ref 0.4–4.2)
UROBILINOGEN, URINE: 0.2 EU/DL
WBC # BLD: 5.6 THOU/MM3 (ref 4.8–10.8)
WBC UA: ABNORMAL /HPF
YEAST: ABNORMAL

## 2018-04-06 PROCEDURE — 96365 THER/PROPH/DIAG IV INF INIT: CPT

## 2018-04-06 PROCEDURE — 87086 URINE CULTURE/COLONY COUNT: CPT

## 2018-04-06 PROCEDURE — 80053 COMPREHEN METABOLIC PANEL: CPT

## 2018-04-06 PROCEDURE — 84443 ASSAY THYROID STIM HORMONE: CPT

## 2018-04-06 PROCEDURE — 74019 RADEX ABDOMEN 2 VIEWS: CPT

## 2018-04-06 PROCEDURE — 99284 EMERGENCY DEPT VISIT MOD MDM: CPT

## 2018-04-06 PROCEDURE — 96361 HYDRATE IV INFUSION ADD-ON: CPT

## 2018-04-06 PROCEDURE — 2580000003 HC RX 258: Performed by: EMERGENCY MEDICINE

## 2018-04-06 PROCEDURE — 85025 COMPLETE CBC W/AUTO DIFF WBC: CPT

## 2018-04-06 PROCEDURE — 6360000002 HC RX W HCPCS: Performed by: EMERGENCY MEDICINE

## 2018-04-06 PROCEDURE — 81001 URINALYSIS AUTO W/SCOPE: CPT

## 2018-04-06 PROCEDURE — 82272 OCCULT BLD FECES 1-3 TESTS: CPT

## 2018-04-06 PROCEDURE — 36415 COLL VENOUS BLD VENIPUNCTURE: CPT

## 2018-04-06 RX ORDER — SODIUM CHLORIDE 9 MG/ML
INJECTION, SOLUTION INTRAVENOUS CONTINUOUS
Status: DISCONTINUED | OUTPATIENT
Start: 2018-04-06 | End: 2018-04-06 | Stop reason: HOSPADM

## 2018-04-06 RX ORDER — CEFUROXIME AXETIL 250 MG/1
250 TABLET ORAL 2 TIMES DAILY
Qty: 14 TABLET | Refills: 0 | Status: SHIPPED | OUTPATIENT
Start: 2018-04-06 | End: 2018-04-17

## 2018-04-06 RX ADMIN — SODIUM CHLORIDE: 9 INJECTION, SOLUTION INTRAVENOUS at 17:20

## 2018-04-06 RX ADMIN — CEFTRIAXONE 1 G: 1 INJECTION, POWDER, FOR SOLUTION INTRAMUSCULAR; INTRAVENOUS at 19:08

## 2018-04-06 ASSESSMENT — ENCOUNTER SYMPTOMS
RHINORRHEA: 0
BACK PAIN: 0
NAUSEA: 1
WHEEZING: 0
VOMITING: 1
EYE DISCHARGE: 0
CONSTIPATION: 1
DIARRHEA: 0
COUGH: 0
SORE THROAT: 0
ABDOMINAL PAIN: 1
EYE PAIN: 0
SHORTNESS OF BREATH: 0

## 2018-04-06 ASSESSMENT — PAIN DESCRIPTION - DESCRIPTORS: DESCRIPTORS: ACHING

## 2018-04-06 ASSESSMENT — PAIN DESCRIPTION - PAIN TYPE: TYPE: ACUTE PAIN;CHRONIC PAIN

## 2018-04-07 LAB
ORGANISM: ABNORMAL
URINE CULTURE REFLEX: ABNORMAL

## 2018-04-09 ENCOUNTER — OFFICE VISIT (OUTPATIENT)
Dept: PSYCHOLOGY | Age: 63
End: 2018-04-09
Payer: COMMERCIAL

## 2018-04-09 DIAGNOSIS — F33.0 MILD EPISODE OF RECURRENT MAJOR DEPRESSIVE DISORDER (HCC): ICD-10-CM

## 2018-04-09 DIAGNOSIS — K58.1 IRRITABLE BOWEL SYNDROME WITH CONSTIPATION: ICD-10-CM

## 2018-04-09 DIAGNOSIS — F41.9 ANXIETY: Primary | ICD-10-CM

## 2018-04-09 PROCEDURE — 90834 PSYTX W PT 45 MINUTES: CPT | Performed by: PSYCHOLOGIST

## 2018-04-11 ENCOUNTER — OFFICE VISIT (OUTPATIENT)
Dept: FAMILY MEDICINE CLINIC | Age: 63
End: 2018-04-11
Payer: COMMERCIAL

## 2018-04-11 VITALS
DIASTOLIC BLOOD PRESSURE: 72 MMHG | BODY MASS INDEX: 23.63 KG/M2 | HEART RATE: 60 BPM | SYSTOLIC BLOOD PRESSURE: 110 MMHG | WEIGHT: 121 LBS

## 2018-04-11 DIAGNOSIS — F41.9 ANXIETY: ICD-10-CM

## 2018-04-11 DIAGNOSIS — N30.00 ACUTE CYSTITIS WITHOUT HEMATURIA: ICD-10-CM

## 2018-04-11 DIAGNOSIS — K58.1 IRRITABLE BOWEL SYNDROME WITH CONSTIPATION: Primary | ICD-10-CM

## 2018-04-11 PROCEDURE — 99214 OFFICE O/P EST MOD 30 MIN: CPT | Performed by: FAMILY MEDICINE

## 2018-04-11 ASSESSMENT — ENCOUNTER SYMPTOMS
NAUSEA: 0
EYE DISCHARGE: 0
SHORTNESS OF BREATH: 0
CONSTIPATION: 1
BLOOD IN STOOL: 0
COLOR CHANGE: 0
DIARRHEA: 0
CHEST TIGHTNESS: 0
VOMITING: 0
EYE REDNESS: 0

## 2018-04-16 DIAGNOSIS — E11.9 TYPE 2 DIABETES MELLITUS WITHOUT COMPLICATION, WITHOUT LONG-TERM CURRENT USE OF INSULIN (HCC): ICD-10-CM

## 2018-04-17 ENCOUNTER — OFFICE VISIT (OUTPATIENT)
Dept: FAMILY MEDICINE CLINIC | Age: 63
End: 2018-04-17
Payer: COMMERCIAL

## 2018-04-17 VITALS
HEART RATE: 64 BPM | HEIGHT: 60 IN | DIASTOLIC BLOOD PRESSURE: 76 MMHG | WEIGHT: 130 LBS | BODY MASS INDEX: 25.52 KG/M2 | SYSTOLIC BLOOD PRESSURE: 120 MMHG

## 2018-04-17 DIAGNOSIS — N30.00 ACUTE CYSTITIS WITHOUT HEMATURIA: ICD-10-CM

## 2018-04-17 DIAGNOSIS — K58.1 IRRITABLE BOWEL SYNDROME WITH CONSTIPATION: Primary | ICD-10-CM

## 2018-04-17 DIAGNOSIS — R30.0 DYSURIA: ICD-10-CM

## 2018-04-17 LAB
BILIRUBIN, POC: NEGATIVE
BLOOD URINE, POC: NEGATIVE
CLARITY, POC: CLEAR
COLOR, POC: YELLOW
GLUCOSE URINE, POC: NEGATIVE
KETONES, POC: NEGATIVE
LEUKOCYTE EST, POC: NEGATIVE
NITRITE, POC: NORMAL
PH, POC: 5.5
PROTEIN, POC: NEGATIVE
SPECIFIC GRAVITY, POC: 1.02
UROBILINOGEN, POC: 0.2

## 2018-04-17 PROCEDURE — 81002 URINALYSIS NONAUTO W/O SCOPE: CPT | Performed by: FAMILY MEDICINE

## 2018-04-17 PROCEDURE — 99214 OFFICE O/P EST MOD 30 MIN: CPT | Performed by: FAMILY MEDICINE

## 2018-04-17 RX ORDER — NITROFURANTOIN 25; 75 MG/1; MG/1
100 CAPSULE ORAL 2 TIMES DAILY
Qty: 10 CAPSULE | Refills: 0 | Status: SHIPPED | OUTPATIENT
Start: 2018-04-17 | End: 2018-04-22

## 2018-04-17 ASSESSMENT — ENCOUNTER SYMPTOMS
SHORTNESS OF BREATH: 0
EYE REDNESS: 0
CHEST TIGHTNESS: 0
NAUSEA: 0
EYE DISCHARGE: 0
VOMITING: 0
CONSTIPATION: 1
DIARRHEA: 0
ABDOMINAL DISTENTION: 0

## 2018-04-19 ENCOUNTER — TELEPHONE (OUTPATIENT)
Dept: FAMILY MEDICINE CLINIC | Age: 63
End: 2018-04-19

## 2018-04-24 ENCOUNTER — OFFICE VISIT (OUTPATIENT)
Dept: PSYCHOLOGY | Age: 63
End: 2018-04-24
Payer: COMMERCIAL

## 2018-04-24 DIAGNOSIS — F33.0 MILD EPISODE OF RECURRENT MAJOR DEPRESSIVE DISORDER (HCC): ICD-10-CM

## 2018-04-24 DIAGNOSIS — F41.9 ANXIETY: Primary | ICD-10-CM

## 2018-04-24 PROCEDURE — 90834 PSYTX W PT 45 MINUTES: CPT | Performed by: PSYCHOLOGIST

## 2018-04-26 ENCOUNTER — TELEPHONE (OUTPATIENT)
Dept: NEUROLOGY | Age: 63
End: 2018-04-26

## 2018-04-26 ENCOUNTER — HOSPITAL ENCOUNTER (EMERGENCY)
Age: 63
Discharge: HOME | End: 2018-04-26
Attending: EMERGENCY MEDICINE
Payer: COMMERCIAL

## 2018-04-26 ENCOUNTER — APPOINTMENT (OUTPATIENT)
Dept: CT IMAGING | Age: 63
End: 2018-04-26
Payer: COMMERCIAL

## 2018-04-26 VITALS
TEMPERATURE: 98.1 F | SYSTOLIC BLOOD PRESSURE: 138 MMHG | OXYGEN SATURATION: 98 % | DIASTOLIC BLOOD PRESSURE: 72 MMHG | HEART RATE: 95 BPM | RESPIRATION RATE: 17 BRPM

## 2018-04-26 DIAGNOSIS — R41.3 ACUTE MEMORY IMPAIRMENT: Primary | ICD-10-CM

## 2018-04-26 LAB
ANION GAP SERPL CALCULATED.3IONS-SCNC: 14 MEQ/L (ref 8–16)
BACTERIA: ABNORMAL
BASOPHILS # BLD: 0.8 %
BASOPHILS ABSOLUTE: 0 THOU/MM3 (ref 0–0.1)
BILIRUBIN URINE: NEGATIVE
BLOOD, URINE: NEGATIVE
BUN BLDV-MCNC: 23 MG/DL (ref 7–22)
CALCIUM SERPL-MCNC: 9.3 MG/DL (ref 8.5–10.5)
CASTS: ABNORMAL /LPF
CASTS: ABNORMAL /LPF
CHARACTER, URINE: CLEAR
CHLORIDE BLD-SCNC: 104 MEQ/L (ref 98–111)
CO2: 25 MEQ/L (ref 23–33)
COLOR: YELLOW
CREAT SERPL-MCNC: 1.6 MG/DL (ref 0.4–1.2)
CRYSTALS: ABNORMAL
EKG ATRIAL RATE: 90 BPM
EKG P AXIS: 68 DEGREES
EKG P-R INTERVAL: 146 MS
EKG Q-T INTERVAL: 378 MS
EKG QRS DURATION: 100 MS
EKG QTC CALCULATION (BAZETT): 462 MS
EKG R AXIS: 72 DEGREES
EKG T AXIS: 55 DEGREES
EKG VENTRICULAR RATE: 90 BPM
EOSINOPHIL # BLD: 1.4 %
EOSINOPHILS ABSOLUTE: 0.1 THOU/MM3 (ref 0–0.4)
EPITHELIAL CELLS, UA: ABNORMAL /HPF
GFR SERPL CREATININE-BSD FRML MDRD: 32 ML/MIN/1.73M2
GLUCOSE BLD-MCNC: 85 MG/DL (ref 70–108)
GLUCOSE BLD-MCNC: 88 MG/DL (ref 70–108)
GLUCOSE, URINE: NEGATIVE MG/DL
HCT VFR BLD CALC: 29 % (ref 37–47)
HEMOGLOBIN: 9.5 GM/DL (ref 12–16)
KETONES, URINE: NEGATIVE
LEUKOCYTE ESTERASE, URINE: ABNORMAL
LYMPHOCYTES # BLD: 33.2 %
LYMPHOCYTES ABSOLUTE: 1.9 THOU/MM3 (ref 1–4.8)
MCH RBC QN AUTO: 31.2 PG (ref 27–31)
MCHC RBC AUTO-ENTMCNC: 33 GM/DL (ref 33–37)
MCV RBC AUTO: 94.5 FL (ref 81–99)
MISCELLANEOUS LAB TEST RESULT: ABNORMAL
MONOCYTES # BLD: 9.3 %
MONOCYTES ABSOLUTE: 0.5 THOU/MM3 (ref 0.4–1.3)
NITRITE, URINE: NEGATIVE
NUCLEATED RED BLOOD CELLS: 0 /100 WBC
OSMOLALITY CALCULATION: 288.1 MOSMOL/KG (ref 275–300)
PDW BLD-RTO: 14.5 % (ref 11.5–14.5)
PH UA: 8
PLATELET # BLD: 239 THOU/MM3 (ref 130–400)
PMV BLD AUTO: 8.4 FL (ref 7.4–10.4)
POTASSIUM SERPL-SCNC: 4.7 MEQ/L (ref 3.5–5.2)
PROTEIN UA: ABNORMAL MG/DL
RBC # BLD: 3.06 MILL/MM3 (ref 4.2–5.4)
RBC URINE: ABNORMAL /HPF
RENAL EPITHELIAL, UA: ABNORMAL
SEG NEUTROPHILS: 55.3 %
SEGMENTED NEUTROPHILS ABSOLUTE COUNT: 3.2 THOU/MM3 (ref 1.8–7.7)
SODIUM BLD-SCNC: 143 MEQ/L (ref 135–145)
SPECIFIC GRAVITY UA: 1.01 (ref 1–1.03)
UROBILINOGEN, URINE: 0.2 EU/DL
WBC # BLD: 5.7 THOU/MM3 (ref 4.8–10.8)
WBC UA: ABNORMAL /HPF
YEAST: ABNORMAL

## 2018-04-26 PROCEDURE — 70450 CT HEAD/BRAIN W/O DYE: CPT

## 2018-04-26 PROCEDURE — 36415 COLL VENOUS BLD VENIPUNCTURE: CPT

## 2018-04-26 PROCEDURE — 99285 EMERGENCY DEPT VISIT HI MDM: CPT

## 2018-04-26 PROCEDURE — 85025 COMPLETE CBC W/AUTO DIFF WBC: CPT

## 2018-04-26 PROCEDURE — 6360000004 HC RX CONTRAST MEDICATION: Performed by: EMERGENCY MEDICINE

## 2018-04-26 PROCEDURE — 82948 REAGENT STRIP/BLOOD GLUCOSE: CPT

## 2018-04-26 PROCEDURE — 81001 URINALYSIS AUTO W/SCOPE: CPT

## 2018-04-26 PROCEDURE — 70496 CT ANGIOGRAPHY HEAD: CPT

## 2018-04-26 PROCEDURE — 80048 BASIC METABOLIC PNL TOTAL CA: CPT

## 2018-04-26 PROCEDURE — 70498 CT ANGIOGRAPHY NECK: CPT

## 2018-04-26 PROCEDURE — 93005 ELECTROCARDIOGRAM TRACING: CPT | Performed by: EMERGENCY MEDICINE

## 2018-04-26 RX ORDER — ONDANSETRON 4 MG/1
4 TABLET, ORALLY DISINTEGRATING ORAL EVERY 8 HOURS PRN
Qty: 20 TABLET | Refills: 0 | Status: SHIPPED | OUTPATIENT
Start: 2018-04-26 | End: 2018-05-16

## 2018-04-26 RX ADMIN — IOPAMIDOL 80 ML: 755 INJECTION, SOLUTION INTRAVENOUS at 11:56

## 2018-04-26 ASSESSMENT — PAIN DESCRIPTION - LOCATION: LOCATION: HEAD

## 2018-04-26 ASSESSMENT — PAIN DESCRIPTION - DESCRIPTORS: DESCRIPTORS: ACHING

## 2018-04-26 ASSESSMENT — PAIN SCALES - GENERAL: PAINLEVEL_OUTOF10: 8

## 2018-04-26 ASSESSMENT — PAIN DESCRIPTION - FREQUENCY: FREQUENCY: INTERMITTENT

## 2018-04-26 ASSESSMENT — PAIN DESCRIPTION - PROGRESSION: CLINICAL_PROGRESSION: NOT CHANGED

## 2018-04-26 ASSESSMENT — PAIN DESCRIPTION - ORIENTATION: ORIENTATION: MID

## 2018-04-26 ASSESSMENT — PAIN DESCRIPTION - ONSET: ONSET: GRADUAL

## 2018-04-26 ASSESSMENT — PAIN DESCRIPTION - PAIN TYPE: TYPE: ACUTE PAIN

## 2018-04-27 ENCOUNTER — OFFICE VISIT (OUTPATIENT)
Dept: FAMILY MEDICINE CLINIC | Age: 63
End: 2018-04-27
Payer: COMMERCIAL

## 2018-04-27 ENCOUNTER — CARE COORDINATION (OUTPATIENT)
Dept: CASE MANAGEMENT | Age: 63
End: 2018-04-27

## 2018-04-27 VITALS
BODY MASS INDEX: 24.54 KG/M2 | SYSTOLIC BLOOD PRESSURE: 136 MMHG | HEART RATE: 64 BPM | HEIGHT: 60 IN | WEIGHT: 125 LBS | DIASTOLIC BLOOD PRESSURE: 70 MMHG

## 2018-04-27 DIAGNOSIS — J02.9 ACUTE VIRAL PHARYNGITIS: Primary | ICD-10-CM

## 2018-04-27 PROCEDURE — 99212 OFFICE O/P EST SF 10 MIN: CPT | Performed by: FAMILY MEDICINE

## 2018-04-27 RX ORDER — CEPHALEXIN 500 MG/1
500 CAPSULE ORAL 3 TIMES DAILY
Qty: 30 CAPSULE | Refills: 0 | Status: SHIPPED | OUTPATIENT
Start: 2018-04-27 | End: 2018-05-25 | Stop reason: ALTCHOICE

## 2018-04-30 ENCOUNTER — OFFICE VISIT (OUTPATIENT)
Dept: PSYCHOLOGY | Age: 63
End: 2018-04-30
Payer: COMMERCIAL

## 2018-04-30 ENCOUNTER — TELEPHONE (OUTPATIENT)
Dept: FAMILY MEDICINE CLINIC | Age: 63
End: 2018-04-30

## 2018-04-30 ENCOUNTER — CARE COORDINATION (OUTPATIENT)
Dept: CASE MANAGEMENT | Age: 63
End: 2018-04-30

## 2018-04-30 ENCOUNTER — OFFICE VISIT (OUTPATIENT)
Dept: NEUROLOGY | Age: 63
End: 2018-04-30
Payer: COMMERCIAL

## 2018-04-30 VITALS
WEIGHT: 130.8 LBS | BODY MASS INDEX: 25.68 KG/M2 | SYSTOLIC BLOOD PRESSURE: 120 MMHG | DIASTOLIC BLOOD PRESSURE: 80 MMHG | HEIGHT: 60 IN | HEART RATE: 95 BPM

## 2018-04-30 DIAGNOSIS — I63.00 CEREBRAL INFARCTION DUE TO THROMBOSIS OF PRECEREBRAL ARTERY (HCC): Chronic | ICD-10-CM

## 2018-04-30 DIAGNOSIS — F41.9 ANXIETY: ICD-10-CM

## 2018-04-30 DIAGNOSIS — F33.0 MILD EPISODE OF RECURRENT MAJOR DEPRESSIVE DISORDER (HCC): Primary | ICD-10-CM

## 2018-04-30 DIAGNOSIS — K58.1 IRRITABLE BOWEL SYNDROME WITH CONSTIPATION: ICD-10-CM

## 2018-04-30 DIAGNOSIS — G44.209 TENSION HEADACHE: Primary | ICD-10-CM

## 2018-04-30 PROCEDURE — 99213 OFFICE O/P EST LOW 20 MIN: CPT | Performed by: PSYCHIATRY & NEUROLOGY

## 2018-04-30 PROCEDURE — 90834 PSYTX W PT 45 MINUTES: CPT | Performed by: PSYCHOLOGIST

## 2018-05-04 ENCOUNTER — TELEPHONE (OUTPATIENT)
Dept: FAMILY MEDICINE CLINIC | Age: 63
End: 2018-05-04

## 2018-05-07 RX ORDER — AMLODIPINE BESYLATE 10 MG/1
10 TABLET ORAL DAILY
Qty: 90 TABLET | Refills: 0 | Status: SHIPPED | OUTPATIENT
Start: 2018-05-07 | End: 2018-07-26 | Stop reason: SDUPTHER

## 2018-05-14 ENCOUNTER — OFFICE VISIT (OUTPATIENT)
Dept: PSYCHOLOGY | Age: 63
End: 2018-05-14
Payer: COMMERCIAL

## 2018-05-14 DIAGNOSIS — F33.0 MILD EPISODE OF RECURRENT MAJOR DEPRESSIVE DISORDER (HCC): ICD-10-CM

## 2018-05-14 DIAGNOSIS — F41.9 ANXIETY: ICD-10-CM

## 2018-05-14 DIAGNOSIS — I63.00 CEREBRAL INFARCTION DUE TO THROMBOSIS OF PRECEREBRAL ARTERY (HCC): Primary | Chronic | ICD-10-CM

## 2018-05-14 PROCEDURE — 90834 PSYTX W PT 45 MINUTES: CPT | Performed by: PSYCHOLOGIST

## 2018-05-14 ASSESSMENT — PATIENT HEALTH QUESTIONNAIRE - PHQ9
3. TROUBLE FALLING OR STAYING ASLEEP: 0
1. LITTLE INTEREST OR PLEASURE IN DOING THINGS: 0
8. MOVING OR SPEAKING SO SLOWLY THAT OTHER PEOPLE COULD HAVE NOTICED. OR THE OPPOSITE, BEING SO FIGETY OR RESTLESS THAT YOU HAVE BEEN MOVING AROUND A LOT MORE THAN USUAL: 0
10. IF YOU CHECKED OFF ANY PROBLEMS, HOW DIFFICULT HAVE THESE PROBLEMS MADE IT FOR YOU TO DO YOUR WORK, TAKE CARE OF THINGS AT HOME, OR GET ALONG WITH OTHER PEOPLE: 0
2. FEELING DOWN, DEPRESSED OR HOPELESS: 0
6. FEELING BAD ABOUT YOURSELF - OR THAT YOU ARE A FAILURE OR HAVE LET YOURSELF OR YOUR FAMILY DOWN: 0
4. FEELING TIRED OR HAVING LITTLE ENERGY: 2
9. THOUGHTS THAT YOU WOULD BE BETTER OFF DEAD, OR OF HURTING YOURSELF: 0
7. TROUBLE CONCENTRATING ON THINGS, SUCH AS READING THE NEWSPAPER OR WATCHING TELEVISION: 0
SUM OF ALL RESPONSES TO PHQ9 QUESTIONS 1 & 2: 0
SUM OF ALL RESPONSES TO PHQ QUESTIONS 1-9: 2
5. POOR APPETITE OR OVEREATING: 0

## 2018-05-14 ASSESSMENT — ANXIETY QUESTIONNAIRES
3. WORRYING TOO MUCH ABOUT DIFFERENT THINGS: 0-NOT AT ALL SURE
1. FEELING NERVOUS, ANXIOUS, OR ON EDGE: 1-SEVERAL DAYS
6. BECOMING EASILY ANNOYED OR IRRITABLE: 1-SEVERAL DAYS
4. TROUBLE RELAXING: 1-SEVERAL DAYS
2. NOT BEING ABLE TO STOP OR CONTROL WORRYING: 0-NOT AT ALL SURE
7. FEELING AFRAID AS IF SOMETHING AWFUL MIGHT HAPPEN: 1-SEVERAL DAYS
GAD7 TOTAL SCORE: 4
5. BEING SO RESTLESS THAT IT IS HARD TO SIT STILL: 0-NOT AT ALL SURE

## 2018-05-15 ENCOUNTER — OFFICE VISIT (OUTPATIENT)
Dept: NEUROLOGY | Age: 63
End: 2018-05-15
Payer: COMMERCIAL

## 2018-05-15 VITALS
HEIGHT: 60 IN | BODY MASS INDEX: 25.13 KG/M2 | SYSTOLIC BLOOD PRESSURE: 126 MMHG | HEART RATE: 78 BPM | WEIGHT: 128 LBS | DIASTOLIC BLOOD PRESSURE: 70 MMHG

## 2018-05-15 DIAGNOSIS — F51.01 PRIMARY INSOMNIA: ICD-10-CM

## 2018-05-15 DIAGNOSIS — G44.209 TENSION HEADACHE: Primary | ICD-10-CM

## 2018-05-15 PROCEDURE — 99213 OFFICE O/P EST LOW 20 MIN: CPT | Performed by: PSYCHIATRY & NEUROLOGY

## 2018-05-16 ENCOUNTER — OFFICE VISIT (OUTPATIENT)
Dept: FAMILY MEDICINE CLINIC | Age: 63
End: 2018-05-16
Payer: COMMERCIAL

## 2018-05-16 ENCOUNTER — CARE COORDINATION (OUTPATIENT)
Dept: CARE COORDINATION | Age: 63
End: 2018-05-16

## 2018-05-16 VITALS
BODY MASS INDEX: 25.13 KG/M2 | WEIGHT: 128 LBS | HEIGHT: 60 IN | SYSTOLIC BLOOD PRESSURE: 118 MMHG | HEART RATE: 72 BPM | DIASTOLIC BLOOD PRESSURE: 66 MMHG

## 2018-05-16 DIAGNOSIS — R79.89 ELEVATED SERUM CREATININE: ICD-10-CM

## 2018-05-16 DIAGNOSIS — K21.9 GASTROESOPHAGEAL REFLUX DISEASE WITHOUT ESOPHAGITIS: ICD-10-CM

## 2018-05-16 DIAGNOSIS — I10 ESSENTIAL HYPERTENSION: Primary | ICD-10-CM

## 2018-05-16 DIAGNOSIS — E78.00 PURE HYPERCHOLESTEROLEMIA: ICD-10-CM

## 2018-05-16 DIAGNOSIS — R53.1 GENERALIZED WEAKNESS: ICD-10-CM

## 2018-05-16 PROCEDURE — 99214 OFFICE O/P EST MOD 30 MIN: CPT | Performed by: FAMILY MEDICINE

## 2018-05-16 ASSESSMENT — ENCOUNTER SYMPTOMS
COLOR CHANGE: 0
VOMITING: 0
EYE DISCHARGE: 0
BLURRED VISION: 0
SHORTNESS OF BREATH: 0
COUGH: 0
EYE REDNESS: 0
HEARTBURN: 1
NAUSEA: 0
SORE THROAT: 0

## 2018-05-21 ENCOUNTER — HOSPITAL ENCOUNTER (OUTPATIENT)
Age: 63
Discharge: HOME | End: 2018-05-21
Payer: COMMERCIAL

## 2018-05-21 DIAGNOSIS — R79.89 ELEVATED SERUM CREATININE: ICD-10-CM

## 2018-05-21 LAB
ANION GAP SERPL CALCULATED.3IONS-SCNC: 15 MEQ/L (ref 8–16)
BUN BLDV-MCNC: 35 MG/DL (ref 7–22)
CALCIUM SERPL-MCNC: 9.3 MG/DL (ref 8.5–10.5)
CHLORIDE BLD-SCNC: 102 MEQ/L (ref 98–111)
CO2: 25 MEQ/L (ref 23–33)
CREAT SERPL-MCNC: 1.7 MG/DL (ref 0.4–1.2)
GFR SERPL CREATININE-BSD FRML MDRD: 30 ML/MIN/1.73M2
GLUCOSE BLD-MCNC: 92 MG/DL (ref 70–108)
POTASSIUM SERPL-SCNC: 5 MEQ/L (ref 3.5–5.2)
SODIUM BLD-SCNC: 142 MEQ/L (ref 135–145)

## 2018-05-21 PROCEDURE — 80048 BASIC METABOLIC PNL TOTAL CA: CPT

## 2018-05-21 PROCEDURE — 36415 COLL VENOUS BLD VENIPUNCTURE: CPT

## 2018-05-22 ENCOUNTER — TELEPHONE (OUTPATIENT)
Dept: FAMILY MEDICINE CLINIC | Age: 63
End: 2018-05-22

## 2018-05-25 ENCOUNTER — CARE COORDINATION (OUTPATIENT)
Dept: CARE COORDINATION | Age: 63
End: 2018-05-25

## 2018-05-31 DIAGNOSIS — K58.9 IRRITABLE BOWEL SYNDROME WITHOUT DIARRHEA: ICD-10-CM

## 2018-05-31 RX ORDER — CHLORDIAZEPOXIDE HYDROCHLORIDE AND CLIDINIUM BROMIDE 5; 2.5 MG/1; MG/1
CAPSULE ORAL
Qty: 180 CAPSULE | Refills: 0 | Status: SHIPPED | OUTPATIENT
Start: 2018-05-31 | End: 2018-08-30 | Stop reason: SDUPTHER

## 2018-06-11 ENCOUNTER — OFFICE VISIT (OUTPATIENT)
Dept: PSYCHOLOGY | Age: 63
End: 2018-06-11
Payer: COMMERCIAL

## 2018-06-11 DIAGNOSIS — F33.0 MILD EPISODE OF RECURRENT MAJOR DEPRESSIVE DISORDER (HCC): Primary | ICD-10-CM

## 2018-06-11 DIAGNOSIS — K58.1 IRRITABLE BOWEL SYNDROME WITH CONSTIPATION: ICD-10-CM

## 2018-06-11 DIAGNOSIS — I63.00 CEREBRAL INFARCTION DUE TO THROMBOSIS OF PRECEREBRAL ARTERY (HCC): Chronic | ICD-10-CM

## 2018-06-11 DIAGNOSIS — F41.9 ANXIETY: ICD-10-CM

## 2018-06-11 PROCEDURE — 90834 PSYTX W PT 45 MINUTES: CPT | Performed by: PSYCHOLOGIST

## 2018-06-11 RX ORDER — TRAZODONE HYDROCHLORIDE 50 MG/1
50 TABLET ORAL
COMMUNITY
End: 2018-07-14 | Stop reason: SDUPTHER

## 2018-06-12 ENCOUNTER — CARE COORDINATION (OUTPATIENT)
Dept: CARE COORDINATION | Age: 63
End: 2018-06-12

## 2018-06-20 ENCOUNTER — OFFICE VISIT (OUTPATIENT)
Dept: PSYCHOLOGY | Age: 63
End: 2018-06-20
Payer: COMMERCIAL

## 2018-06-20 DIAGNOSIS — K58.1 IRRITABLE BOWEL SYNDROME WITH CONSTIPATION: Primary | ICD-10-CM

## 2018-06-20 DIAGNOSIS — F33.0 MILD EPISODE OF RECURRENT MAJOR DEPRESSIVE DISORDER (HCC): ICD-10-CM

## 2018-06-20 PROCEDURE — 90834 PSYTX W PT 45 MINUTES: CPT | Performed by: PSYCHOLOGIST

## 2018-07-09 ENCOUNTER — OFFICE VISIT (OUTPATIENT)
Dept: PSYCHOLOGY | Age: 63
End: 2018-07-09
Payer: COMMERCIAL

## 2018-07-09 DIAGNOSIS — F41.9 ANXIETY: ICD-10-CM

## 2018-07-09 DIAGNOSIS — F33.0 MILD EPISODE OF RECURRENT MAJOR DEPRESSIVE DISORDER (HCC): ICD-10-CM

## 2018-07-09 DIAGNOSIS — K58.1 IRRITABLE BOWEL SYNDROME WITH CONSTIPATION: Primary | ICD-10-CM

## 2018-07-09 PROCEDURE — 90834 PSYTX W PT 45 MINUTES: CPT | Performed by: PSYCHOLOGIST

## 2018-07-09 NOTE — PROGRESS NOTES
[facility]  [address]  [address] [place] [address]  Dept: [phone]  Dept Fax: [phone]: [phone]    Patient: Coty Mauro  Visit Date: 2018  Referring Provider:   No ref. provider found    SUBJECTIVE DATA     CHIEF COMPLAINT:    Chief Complaint   Patient presents with    Anxiety    Other    Stress       Patient update:  Patient reports that they have moved in her Kisandra Nayak, whose life has been in chaos. They established some strict rules on staying sober, and so far it is going well. She feels good about him being there, and they are all doing well, so far. He has 30+ days of sobriety, now, and so they are pleased. The down side is that now Elías Abreu and Nelly Peañ can't travel. But she is accepting it. Continues with the depression support group, which has been helpful. Done with her PT since she did so well. IBS has been acting out, which can lead to her being tired and possibly dehydrated. Uncle passed away, and she was at the  this weekend. Wasn't able to talk to all the people at the  that she wanted to, but it was a good event, with her uncle having been very well loved. Had nausea when she feared she'd run into sister Oscar Chapin at the . Discussed the need to set good boundaries and be firm with her. Sleeps well all night even though she falls asleep in the evening for a couple of hours. OBJECTIVE DATA     Physical Exam:    Mental Status Evaluation:   Orientation: Alert, oriented. Mood:. Anxious      Affect:  normal and anxious. Appearance:  Normal--has lost a lot of weight in the past year   Activity:  Normal   Gait/Posture: Slow, small steps. Speech:  Normal   Thought Process:  within normal limits   Thought Content:   Within Normal Limits   Cognition:  Normal   Memory: Normal   Insight:  good   Judgment: fair and Normal   Suicidal Ideations: Denies Suicidal Ideations   Homicidal Ideations: Denies Homicidal Ideations   Medication Side Effects: absent       Attention Span Normal     Screenings Completed in This Encounter:                                      DIAGNOSIS AND ASSESSMENT DATA     DIAGNOSIS:  History of depression and anxiety, old CVA    PLAN   Follow-up:  No Follow-up on file. Homework assignment before next session:     [] Practice deep breathing 1-2 times per day for 15 minutes, as demonstrated in session, and/or:    [] Go to [employer]\" web site and begin practicing with their free meditation podcasts    [x] Track thoughts that you think feed anxiety or depression    [] Go to [employer]\" web site, open up the \"Workbooks\" tab, and select a problem from the list that best suits your needs. Review the first module. [x] Begin to track physical activity. Even five minutes per day will be helpful.    [] Talk with your physician about whether it's OK to start fish oil (burpless) or flax oil, 1000 to 1200 mg per day    [x] Consider attending this support group: patient is already a major contributor to a local, patient-led depression group that meets at [facility]. [] Most importantly, remember this guideline: \"Don't believe everything you think! \"   :)    [] Try \"Expressive Writing\" using the guidelines on the handout    [x] Start yoga class, as discussed. 1. Monday yoga class and/or [employer] [place], when feasible. Plans to start tomorrow. 2. Plan something positive with Carrie or Ana Maria Chavira or another friend--something without Claudia Henao and something to look forward to. 3. Try to increase your caloric intake. 4. Do a little more each day to increase your strength   5. Discuss health concerns with Dr. Margaret Jones. 6. Look at adding in more iron-rich foods to your diet, since you've been running low. Session lasted 50 minutes.     Provider Signature:  Electronically signed by Maye Butts, PhD on 7/9/2018 at 10:01 AM

## 2018-07-12 ENCOUNTER — CARE COORDINATION (OUTPATIENT)
Dept: CARE COORDINATION | Age: 63
End: 2018-07-12

## 2018-07-14 DIAGNOSIS — E78.00 PURE HYPERCHOLESTEROLEMIA: ICD-10-CM

## 2018-07-14 DIAGNOSIS — I10 ESSENTIAL HYPERTENSION: ICD-10-CM

## 2018-07-14 DIAGNOSIS — I63.00 CEREBRAL INFARCTION DUE TO THROMBOSIS OF PRECEREBRAL ARTERY (HCC): Chronic | ICD-10-CM

## 2018-07-14 DIAGNOSIS — F41.9 ANXIETY DISORDER, UNSPECIFIED TYPE: ICD-10-CM

## 2018-07-16 ENCOUNTER — OFFICE VISIT (OUTPATIENT)
Dept: FAMILY MEDICINE CLINIC | Age: 63
End: 2018-07-16
Payer: COMMERCIAL

## 2018-07-16 VITALS
HEART RATE: 84 BPM | BODY MASS INDEX: 25.13 KG/M2 | DIASTOLIC BLOOD PRESSURE: 74 MMHG | WEIGHT: 128 LBS | HEIGHT: 60 IN | SYSTOLIC BLOOD PRESSURE: 122 MMHG

## 2018-07-16 DIAGNOSIS — E11.9 WELL CONTROLLED TYPE 2 DIABETES MELLITUS (HCC): Primary | Chronic | ICD-10-CM

## 2018-07-16 DIAGNOSIS — I10 ESSENTIAL HYPERTENSION: Chronic | ICD-10-CM

## 2018-07-16 DIAGNOSIS — F41.9 ANXIETY: ICD-10-CM

## 2018-07-16 DIAGNOSIS — E78.00 PURE HYPERCHOLESTEROLEMIA: ICD-10-CM

## 2018-07-16 PROCEDURE — 99214 OFFICE O/P EST MOD 30 MIN: CPT | Performed by: FAMILY MEDICINE

## 2018-07-16 RX ORDER — BUPROPION HYDROCHLORIDE 150 MG/1
TABLET, EXTENDED RELEASE ORAL
Qty: 180 TABLET | Refills: 1 | Status: SHIPPED | OUTPATIENT
Start: 2018-07-16 | End: 2018-12-22 | Stop reason: SDUPTHER

## 2018-07-16 RX ORDER — TRAZODONE HYDROCHLORIDE 50 MG/1
TABLET ORAL
Qty: 90 TABLET | Refills: 1 | Status: SHIPPED | OUTPATIENT
Start: 2018-07-16 | End: 2018-12-22 | Stop reason: SDUPTHER

## 2018-07-16 RX ORDER — SIMVASTATIN 20 MG
TABLET ORAL
Qty: 90 TABLET | Refills: 1 | Status: SHIPPED | OUTPATIENT
Start: 2018-07-16 | End: 2019-01-15 | Stop reason: SDUPTHER

## 2018-07-16 RX ORDER — LOSARTAN POTASSIUM 100 MG/1
TABLET ORAL
Qty: 90 TABLET | Refills: 1 | Status: SHIPPED | OUTPATIENT
Start: 2018-07-16 | End: 2018-07-24

## 2018-07-16 RX ORDER — CLOPIDOGREL BISULFATE 75 MG/1
TABLET ORAL
Qty: 90 TABLET | Refills: 1 | Status: SHIPPED | OUTPATIENT
Start: 2018-07-16 | End: 2018-12-22 | Stop reason: SDUPTHER

## 2018-07-16 ASSESSMENT — ENCOUNTER SYMPTOMS
COLOR CHANGE: 0
BLOOD IN STOOL: 0
CHEST TIGHTNESS: 0
EYE REDNESS: 0
DIARRHEA: 1
SHORTNESS OF BREATH: 0
NAUSEA: 0
EYE DISCHARGE: 0
VOMITING: 0
CONSTIPATION: 1

## 2018-07-16 NOTE — PROGRESS NOTES
[address] [place] [address], [facility] [address], [place] Ál[place]  Phone:  [phone]  Fax:  [phone]       Name: Coty Mauro  : 1955    Chief Complaint   Patient presents with    6 Month Follow-Up    Hyperlipidemia    Hypertension       HPI:     Coty Mauro is a 57 y.o. female who presents today for follow-up of T2DM, hypertension, hyperlipidemia, and anxiety. Her last HbA1c in January was very well controlled at 4.9% on Metformin. Her creatinine in January was elevated at 1.7. Her cholesterol has been well-controlled with her last LDL of 43. She has been stressed recently as her 53 yo Gildardo Nayak has been living with her and her  Nelly Peña because he just got out of prison. He is trying to find a job so told her to stay off the phone. Diabetes   She presents for her follow-up diabetic visit. She has type 2 diabetes mellitus. Her disease course has been stable. Hypoglycemia symptoms include nervousness/anxiousness. Pertinent negatives for diabetes include no chest pain, no foot paresthesias, no polydipsia, no polyphagia and no polyuria. There are no hypoglycemic complications. Risk factors for coronary artery disease include diabetes mellitus, hypertension, dyslipidemia and post-menopausal. Current diabetic treatment includes oral agent (monotherapy). She is compliant with treatment most of the time. She is following a generally healthy diet. An ACE inhibitor/angiotensin II receptor blocker is being taken. Hypertension   This is a chronic problem. The problem is unchanged. The problem is controlled. Pertinent negatives include no anxiety, chest pain, palpitations, peripheral edema or shortness of breath. There are no associated agents to hypertension. Past treatments include calcium channel blockers and angiotensin blockers. The current treatment provides moderate improvement. There are no compliance problems. Hyperlipidemia   This is a chronic problem.  The current episode started more than 1 year ago. The problem is controlled. Pertinent negatives include no chest pain or shortness of breath. Current antihyperlipidemic treatment includes statins. The current treatment provides moderate improvement of lipids.          Current Outpatient Prescriptions:     clopidogrel (PLAVIX) 75 MG tablet, TAKE 1 TABLET DAILY, Disp: 90 tablet, Rfl: 1    traZODone (DESYREL) 50 MG tablet, TAKE 1 TABLET NIGHTLY, Disp: 90 tablet, Rfl: 1    simvastatin (ZOCOR) 20 MG tablet, TAKE 1 TABLET DAILY, Disp: 90 tablet, Rfl: 1    losartan (COZAAR) 100 MG tablet, TAKE 1 TABLET DAILY, Disp: 90 tablet, Rfl: 1    buPROPion (WELLBUTRIN SR) 150 MG extended release tablet, TAKE 1 TABLET TWICE A DAY, Disp: 180 tablet, Rfl: 1    chlordiazePOXIDE-clidinium (LIBRAX) 5-2.5 MG per capsule, TAKE 1 CAPSULE TWICE A DAY AS NEEDED FOR PAIN, Disp: 180 capsule, Rfl: 0    amLODIPine (NORVASC) 10 MG tablet, TAKE 1 TABLET BY MOUTH DAILY, Disp: 90 tablet, Rfl: 0    metFORMIN (GLUCOPHAGE) 1000 MG tablet, Take 1 tablet by mouth 2 times daily (with meals), Disp: 60 tablet, Rfl: 5    linaclotide (LINZESS) 145 MCG capsule, Take 1 capsule by mouth daily as needed (constipation), Disp: 30 capsule, Rfl: 2    lactulose (CHRONULAC) 10 GM/15ML solution, Take 15 mLs by mouth 2 times daily as needed (constipation), Disp: 946 mL, Rfl: 1    ranitidine (ZANTAC) 150 MG tablet, Take 1 tablet by mouth 2 times daily, Disp: 60 tablet, Rfl: 0    folic acid (FOLVITE) 463 MCG tablet, Take 400 mcg by mouth daily, Disp: , Rfl:     hydrocortisone (ANUSOL-HC) 25 MG suppository, Place 1 suppository rectally every 12 hours, Disp: 24 suppository, Rfl: 0    loratadine (CLARITIN) 10 MG tablet, Take 1 tablet by mouth daily, Disp: 30 tablet, Rfl: 2    b complex vitamins capsule, Take 1 capsule by mouth daily, Disp: 100 capsule, Rfl: 3    diclofenac 2 % SOLN, Place onto the skin as needed, Disp: , Rfl:     Probiotic Product (PROBIOTIC DAILY PO), Take

## 2018-07-19 ENCOUNTER — NURSE ONLY (OUTPATIENT)
Dept: FAMILY MEDICINE CLINIC | Age: 63
End: 2018-07-19
Payer: COMMERCIAL

## 2018-07-19 DIAGNOSIS — E11.9 WELL CONTROLLED TYPE 2 DIABETES MELLITUS (HCC): Chronic | ICD-10-CM

## 2018-07-19 LAB
ANION GAP SERPL CALCULATED.3IONS-SCNC: 13 MEQ/L (ref 8–16)
AVERAGE GLUCOSE: 87 MG/DL (ref 70–126)
BUN BLDV-MCNC: 34 MG/DL (ref 7–22)
CALCIUM SERPL-MCNC: 9.8 MG/DL (ref 8.5–10.5)
CHLORIDE BLD-SCNC: 105 MEQ/L (ref 98–111)
CO2: 25 MEQ/L (ref 23–33)
CREAT SERPL-MCNC: 1.8 MG/DL (ref 0.4–1.2)
GFR SERPL CREATININE-BSD FRML MDRD: 28 ML/MIN/1.73M2
GLUCOSE BLD-MCNC: 90 MG/DL (ref 70–108)
HBA1C MFR BLD: 4.9 % (ref 4.4–6.4)
POTASSIUM SERPL-SCNC: 5.1 MEQ/L (ref 3.5–5.2)
SODIUM BLD-SCNC: 143 MEQ/L (ref 135–145)

## 2018-07-19 PROCEDURE — 36415 COLL VENOUS BLD VENIPUNCTURE: CPT | Performed by: FAMILY MEDICINE

## 2018-07-20 ENCOUNTER — TELEPHONE (OUTPATIENT)
Dept: FAMILY MEDICINE CLINIC | Age: 63
End: 2018-07-20

## 2018-07-24 ENCOUNTER — OFFICE VISIT (OUTPATIENT)
Dept: NEUROLOGY | Age: 63
End: 2018-07-24
Payer: COMMERCIAL

## 2018-07-24 VITALS
WEIGHT: 122 LBS | HEIGHT: 60 IN | BODY MASS INDEX: 23.95 KG/M2 | DIASTOLIC BLOOD PRESSURE: 72 MMHG | HEART RATE: 68 BPM | SYSTOLIC BLOOD PRESSURE: 124 MMHG

## 2018-07-24 DIAGNOSIS — R41.3 MEMORY PROBLEM: Primary | ICD-10-CM

## 2018-07-24 DIAGNOSIS — G44.209 TENSION HEADACHE: ICD-10-CM

## 2018-07-24 PROCEDURE — 99213 OFFICE O/P EST LOW 20 MIN: CPT | Performed by: PSYCHIATRY & NEUROLOGY

## 2018-07-24 RX ORDER — OMEPRAZOLE 20 MG/1
CAPSULE, DELAYED RELEASE ORAL
Refills: 3 | COMMUNITY
Start: 2018-05-07 | End: 2019-01-15 | Stop reason: SDUPTHER

## 2018-07-24 NOTE — PROGRESS NOTES
(FOLVITE) 400 MCG tablet, Take 400 mcg by mouth daily, Disp: , Rfl:     hydrocortisone (ANUSOL-HC) 25 MG suppository, Place 1 suppository rectally every 12 hours, Disp: 24 suppository, Rfl: 0    loratadine (CLARITIN) 10 MG tablet, Take 1 tablet by mouth daily, Disp: 30 tablet, Rfl: 2    b complex vitamins capsule, Take 1 capsule by mouth daily, Disp: 100 capsule, Rfl: 3    diclofenac 2 % SOLN, Place onto the skin as needed, Disp: , Rfl:     Probiotic Product (PROBIOTIC DAILY PO), Take  by mouth., Disp: , Rfl:     FISH OIL, 1,000 mg by Does not apply route 2 times daily Indications: Decreased Energy , Disp: , Rfl:     aspirin 81 MG tablet, Take 81 mg by mouth daily. , Disp: , Rfl:       PE:   Vitals:    07/24/18 1418   BP: 124/72   Site: Right Arm   Position: Sitting   Cuff Size: Medium Adult   Pulse: 68   Weight: 122 lb (55.3 kg)   Height: 5' (1.524 m)     General Appearance:  alert and cooperative. Her mental state is intact and she is pleasant. Skin:  Skin color, texture, turgor normal. No rashes or lesions. Gen: AO3, NAD, Language is intact, she has fluent speech. Head: PERRL, EOMI, no icterus. Superficial temporal artery pulses are normal.   Neck: The Neck is supple. Neuro: CN 2-12 grossly intact with no focal deficits. Power 5/5 Throughout symmetric, Reflexes are symmetric. Long tracts are intact. Cerebellar exam is Intact. Sensory exam is intact to light touch. Gait is intact. Musculoskeletal:  Has no hand arthritis, no limitation of ROM in any of the four extremities.   Lower extremities no edema        DATA:  Results for orders placed or performed in visit on 02/54/14   Basic Metabolic Panel   Result Value Ref Range    Sodium 143 135 - 145 meq/L    Potassium 5.1 3.5 - 5.2 meq/L    Chloride 105 98 - 111 meq/L    CO2 25 23 - 33 meq/L    Glucose 90 70 - 108 mg/dL    BUN 34 (H) 7 - 22 mg/dL    CREATININE 1.8 (H) 0.4 - 1.2 mg/dL    Calcium 9.8 8.5 - 10.5 mg/dL   Hemoglobin A1C   Result Value Ref

## 2018-07-26 ENCOUNTER — TELEPHONE (OUTPATIENT)
Dept: NEPHROLOGY | Age: 63
End: 2018-07-26

## 2018-07-26 ENCOUNTER — OFFICE VISIT (OUTPATIENT)
Dept: NEPHROLOGY | Age: 63
End: 2018-07-26
Payer: COMMERCIAL

## 2018-07-26 VITALS
BODY MASS INDEX: 23.79 KG/M2 | OXYGEN SATURATION: 99 % | WEIGHT: 121.8 LBS | DIASTOLIC BLOOD PRESSURE: 64 MMHG | HEART RATE: 71 BPM | SYSTOLIC BLOOD PRESSURE: 115 MMHG

## 2018-07-26 DIAGNOSIS — N18.4 CKD (CHRONIC KIDNEY DISEASE), STAGE IV (HCC): Primary | ICD-10-CM

## 2018-07-26 DIAGNOSIS — I10 ESSENTIAL HYPERTENSION: Chronic | ICD-10-CM

## 2018-07-26 PROCEDURE — 99213 OFFICE O/P EST LOW 20 MIN: CPT | Performed by: INTERNAL MEDICINE

## 2018-07-26 RX ORDER — AMLODIPINE BESYLATE 10 MG/1
10 TABLET ORAL DAILY
Qty: 90 TABLET | Refills: 1 | Status: SHIPPED | OUTPATIENT
Start: 2018-07-26 | End: 2019-01-15

## 2018-07-26 NOTE — TELEPHONE ENCOUNTER
Pt called back and was given this information. Pt will stop the metformin. She will monitor her blood sugars. If they start going up, she will contact Dr. Doroteo Nowak.

## 2018-07-26 NOTE — PROGRESS NOTES
[facility]        Outpatient Follow-Up note         7/26/2018 9:36 AM    Patient Name:   Scott Monique  YOB: 1955  Primary Care Physician:  Rai Ferrell MD      Chief Complaint / Reason for follow-up : Follow Up of CKD     Interval History :  Patient seen and examined by me. Feels well. No CP or SOB . No other issues.   No hospitalizations and no med changes     Past History :  Past Medical History:   Diagnosis Date    Allergic rhinitis     Anxiety     CKD stage 4 due to type 2 diabetes mellitus (HCC)     CVA (cerebral infarction)     Depression     Fibromyalgia     Headache(784.0)     Hyperlipidemia     Hypertension     Irritable bowel syndrome     Type II or unspecified type diabetes mellitus without mention of complication, not stated as uncontrolled     Unspecified cerebral artery occlusion with cerebral infarction     Unspecified sleep apnea      Past Surgical History:   Procedure Laterality Date    CARPAL TUNNEL RELEASE Right     [facility]    ENDOSCOPY, COLON, DIAGNOSTIC  unsure    EYE SURGERY      lasik    HEMORRHOID SURGERY  unsure    HYSTERECTOMY      total    NECK SURGERY  18  years ago    fusion    SINUS SURGERY  10 years ago    TUBAL LIGATION          Medications :     Outpatient Prescriptions Marked as Taking for the 7/26/18 encounter (Office Visit) with John Avles MD   Medication Sig Dispense Refill    omeprazole (PRILOSEC) 20 MG delayed release capsule TK 1 C PO QD  3    clopidogrel (PLAVIX) 75 MG tablet TAKE 1 TABLET DAILY 90 tablet 1    traZODone (DESYREL) 50 MG tablet TAKE 1 TABLET NIGHTLY 90 tablet 1    simvastatin (ZOCOR) 20 MG tablet TAKE 1 TABLET DAILY 90 tablet 1    buPROPion (WELLBUTRIN SR) 150 MG extended release tablet TAKE 1 TABLET TWICE A  tablet 1    chlordiazePOXIDE-clidinium (LIBRAX) 5-2.5 MG per capsule TAKE 1 CAPSULE TWICE A DAY AS NEEDED FOR PAIN 180 capsule 0    resistive indices . 5 mm cyst rt kidney      Other : old  lab data and PCP notes have been reviewed and noted patient's creatinine was around 1.5 in 2016 and in 2014 it was 0.7 A CT scan which was done in 2014 which was negative for any abnormalities of the kidney.     Assessment    1. Renal - as per MDRD patient's GFR is around 28 ML per minute minute. This has fluctuated and I think this is her baseline. .    - This is her baseline she has been running around 1.5 even in 2016. - At this time I think we should take her off metformin as well. .   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 .  2. Essential hypertension well controlled continue current medications. 3. Mild hyponatremia better today. 4. Diabetes mellitus with last A1c of 4.9. She is currently on metformin. Due to her GFR we need to consider stopping metformin and if necessary may need to switch to a different medication. 5. History of stroke in 2013  6. Medications reviewed discussed with the patient and  in detail. 7. Follow-up in 4 months    Tests and orders placed this Encounter     Orders Placed This Encounter   Procedures    CBC    PTH, Intact    Creatinine, Random Urine    Protein, urine, random    Comprehensive Metabolic Panel    Vitamin D 25 Hydroxy    Phosphorus    Urinalysis with Microscopic       Nathalie Morris M.D  [facility].

## 2018-08-10 ENCOUNTER — OFFICE VISIT (OUTPATIENT)
Dept: FAMILY MEDICINE CLINIC | Age: 63
End: 2018-08-10
Payer: COMMERCIAL

## 2018-08-10 VITALS
SYSTOLIC BLOOD PRESSURE: 104 MMHG | DIASTOLIC BLOOD PRESSURE: 72 MMHG | HEART RATE: 60 BPM | BODY MASS INDEX: 23.75 KG/M2 | HEIGHT: 60 IN | WEIGHT: 121 LBS

## 2018-08-10 DIAGNOSIS — M25.512 ACUTE PAIN OF LEFT SHOULDER: Primary | ICD-10-CM

## 2018-08-10 PROCEDURE — 99213 OFFICE O/P EST LOW 20 MIN: CPT | Performed by: FAMILY MEDICINE

## 2018-08-10 RX ORDER — PREDNISONE 20 MG/1
40 TABLET ORAL DAILY
Qty: 10 TABLET | Refills: 0 | Status: SHIPPED | OUTPATIENT
Start: 2018-08-10 | End: 2018-09-14 | Stop reason: ALTCHOICE

## 2018-08-10 ASSESSMENT — ENCOUNTER SYMPTOMS
COLOR CHANGE: 0
CONSTIPATION: 0
DIARRHEA: 1

## 2018-08-10 NOTE — PATIENT INSTRUCTIONS
Patient Education        Frozen Shoulder: Care Instructions  Your Care Instructions    Frozen shoulder is stiffness, pain, and trouble moving your shoulder. It may happen after an injury or overuse, or from a disease such as diabetes or a stroke. You may have pain that keeps you from using your shoulder. However, you need to move your shoulder. If you do not move it, it will get more stiff and sore. Your doctor may order an X-ray to make sure there is not another cause for your stiff shoulder. You can treat frozen shoulder with heat, stretching, over-the-counter pain medicines, and physical therapy. Your doctor also may inject medicine into your shoulder to reduce pain and swelling. It can take a year or more to get better. Surgery is almost never needed. Follow-up care is a key part of your treatment and safety. Be sure to make and go to all appointments, and call your doctor if you are having problems. It's also a good idea to know your test results and keep a list of the medicines you take. How can you care for yourself at home? · Take pain medicines exactly as directed. ¨ If the doctor gave you a prescription medicine for pain, take it as prescribed. ¨ If you are not taking a prescription pain medicine, ask your doctor if you can take an over-the-counter medicine. · Put a heating pad set on low or a warm, wet towel wrapped in plastic on your shoulder. The heat may make it easier to stretch your shoulder. · Follow your doctor's advice for stretches and exercises. · Go to physical therapy if your doctor suggests it. · Try these stretching exercises to reduce stiffness if your doctor says it is okay. Do the exercises slowly to avoid injury. Put a warm, wet towel on your shoulder before exercising. Stop any exercise that increases pain. ¨ Pendulum exercise.  While leaning forward and holding onto a table or the back of a chair with your good arm, bend at the waist, allowing your affected arm to hang questions about a medical condition or this instruction, always ask your healthcare professional. [address] any warranty or liability for your use of this information. Patient Education        Frozen Shoulder: Exercises  Your Care Instructions  Here are some examples of typical rehabilitation exercises for your condition. Start each exercise slowly. Ease off the exercise if you start to have pain. Your doctor or physical therapist will tell you when you can start these exercises and which ones will work best for you. How to do the exercises  Neck stretches    1. Look straight ahead, and tip your right ear to your right shoulder. Do not let your left shoulder rise up as you tip your head to the right. 2. Hold 15 to 30 seconds. 3. Tilt your head to the left. Do not let your right shoulder rise up as you tip your head to the left. 4. Hold for 15 to 30 seconds. 5. Repeat 2 to 4 times to each side. Shoulder rolls    1. Sit comfortably with your feet shoulder-width apart. You can also do this exercise while standing. 2. Roll your shoulders up, then back, and then down in a smooth, circular motion. 3. Repeat 2 to 4 times. Shoulder flexion (lying down)    For this exercise, you will need a wand. To make a wand, use a piece of PVC pipe or a broom handle with the broom removed. Make the wand about a foot wider than your shoulders. 1. Lie on your back, holding a wand with your hands. Your palms should face down as you hold the wand. Place your hands slightly wider than your shoulders. 2. Keeping your elbows straight, slowly raise your arms over your head until you feel a stretch in your shoulders, upper back, and chest.  3. Hold 15 to 30 seconds. 4. Repeat 2 to 4 times. Shoulder rotation (lying down)    To make a wand, use a piece of PVC pipe or a broom handle with the broom removed. Make the wand about a foot wider than your shoulders.   1. Lie on your back and hold a wand in both

## 2018-08-12 ENCOUNTER — APPOINTMENT (OUTPATIENT)
Dept: GENERAL RADIOLOGY | Age: 63
End: 2018-08-12
Payer: COMMERCIAL

## 2018-08-12 ENCOUNTER — HOSPITAL ENCOUNTER (EMERGENCY)
Age: 63
Discharge: HOME | End: 2018-08-12
Attending: FAMILY MEDICINE
Payer: COMMERCIAL

## 2018-08-12 VITALS
HEIGHT: 60 IN | DIASTOLIC BLOOD PRESSURE: 90 MMHG | TEMPERATURE: 98.6 F | WEIGHT: 122 LBS | BODY MASS INDEX: 23.95 KG/M2 | HEART RATE: 67 BPM | RESPIRATION RATE: 18 BRPM | OXYGEN SATURATION: 97 % | SYSTOLIC BLOOD PRESSURE: 119 MMHG

## 2018-08-12 DIAGNOSIS — S61.212A: Primary | ICD-10-CM

## 2018-08-12 PROCEDURE — 99283 EMERGENCY DEPT VISIT LOW MDM: CPT

## 2018-08-12 PROCEDURE — 6360000002 HC RX W HCPCS: Performed by: FAMILY MEDICINE

## 2018-08-12 PROCEDURE — 6370000000 HC RX 637 (ALT 250 FOR IP)

## 2018-08-12 PROCEDURE — 90715 TDAP VACCINE 7 YRS/> IM: CPT | Performed by: FAMILY MEDICINE

## 2018-08-12 PROCEDURE — 73130 X-RAY EXAM OF HAND: CPT

## 2018-08-12 PROCEDURE — 90471 IMMUNIZATION ADMIN: CPT | Performed by: FAMILY MEDICINE

## 2018-08-12 PROCEDURE — 2500000003 HC RX 250 WO HCPCS

## 2018-08-12 PROCEDURE — 2709999900 HC NON-CHARGEABLE SUPPLY

## 2018-08-12 PROCEDURE — 12002 RPR S/N/AX/GEN/TRNK2.6-7.5CM: CPT

## 2018-08-12 RX ORDER — LIDOCAINE HYDROCHLORIDE 10 MG/ML
5 INJECTION, SOLUTION INFILTRATION; PERINEURAL ONCE
Status: COMPLETED | OUTPATIENT
Start: 2018-08-12 | End: 2018-08-12

## 2018-08-12 RX ORDER — DOXYCYCLINE HYCLATE 100 MG
100 TABLET ORAL 2 TIMES DAILY
Qty: 20 TABLET | Refills: 0 | Status: SHIPPED | OUTPATIENT
Start: 2018-08-12 | End: 2018-08-22

## 2018-08-12 RX ORDER — IBUPROFEN 200 MG
TABLET ORAL ONCE
Status: COMPLETED | OUTPATIENT
Start: 2018-08-12 | End: 2018-08-12

## 2018-08-12 RX ORDER — BACITRACIN, NEOMYCIN, POLYMYXIN B 400; 3.5; 5 [USP'U]/G; MG/G; [USP'U]/G
OINTMENT TOPICAL
Status: COMPLETED
Start: 2018-08-12 | End: 2018-08-12

## 2018-08-12 RX ORDER — LIDOCAINE HYDROCHLORIDE 10 MG/ML
INJECTION, SOLUTION EPIDURAL; INFILTRATION; INTRACAUDAL; PERINEURAL
Status: COMPLETED
Start: 2018-08-12 | End: 2018-08-12

## 2018-08-12 RX ORDER — LIDOCAINE HYDROCHLORIDE 10 MG/ML
INJECTION, SOLUTION EPIDURAL; INFILTRATION; INTRACAUDAL; PERINEURAL
Status: DISCONTINUED
Start: 2018-08-12 | End: 2018-08-12 | Stop reason: HOSPADM

## 2018-08-12 RX ORDER — CLINDAMYCIN HYDROCHLORIDE 300 MG/1
300 CAPSULE ORAL 3 TIMES DAILY
Qty: 30 CAPSULE | Refills: 0 | Status: SHIPPED | OUTPATIENT
Start: 2018-08-12 | End: 2018-08-22

## 2018-08-12 RX ADMIN — TETANUS TOXOID, REDUCED DIPHTHERIA TOXOID AND ACELLULAR PERTUSSIS VACCINE, ADSORBED 0.5 ML: 5; 2.5; 8; 8; 2.5 SUSPENSION INTRAMUSCULAR at 15:44

## 2018-08-12 RX ADMIN — LIDOCAINE HYDROCHLORIDE 5 ML: 10 INJECTION, SOLUTION EPIDURAL; INFILTRATION; INTRACAUDAL; PERINEURAL at 15:45

## 2018-08-12 RX ADMIN — BACITRACIN ZINC, NEOMYCIN SULFATE, AND POLYMYXIN B SULFATE 1 G: 400; 3.5; 5 OINTMENT TOPICAL at 17:16

## 2018-08-12 RX ADMIN — LIDOCAINE HYDROCHLORIDE 5 ML: 10 INJECTION, SOLUTION INFILTRATION; PERINEURAL at 15:45

## 2018-08-12 RX ADMIN — Medication 1 G: at 17:16

## 2018-08-12 ASSESSMENT — PAIN DESCRIPTION - ORIENTATION
ORIENTATION: RIGHT
ORIENTATION: RIGHT

## 2018-08-12 ASSESSMENT — PAIN DESCRIPTION - PAIN TYPE
TYPE: ACUTE PAIN
TYPE: ACUTE PAIN

## 2018-08-12 ASSESSMENT — PAIN DESCRIPTION - LOCATION
LOCATION: FINGER (COMMENT WHICH ONE)
LOCATION: HAND

## 2018-08-12 ASSESSMENT — ENCOUNTER SYMPTOMS
NAUSEA: 0
EYE PAIN: 0
DIARRHEA: 0
WHEEZING: 0
SHORTNESS OF BREATH: 0
EYE DISCHARGE: 0
VOMITING: 0
RHINORRHEA: 0
ABDOMINAL PAIN: 0
COUGH: 0
BACK PAIN: 0
SORE THROAT: 0

## 2018-08-12 ASSESSMENT — PAIN DESCRIPTION - DESCRIPTORS
DESCRIPTORS: ACHING
DESCRIPTORS: THROBBING

## 2018-08-12 ASSESSMENT — PAIN SCALES - GENERAL
PAINLEVEL_OUTOF10: 7
PAINLEVEL_OUTOF10: 2
PAINLEVEL_OUTOF10: 7

## 2018-08-12 NOTE — ED PROVIDER NOTES
interrupted    Number of sutures:  8  Approximation:     Approximation:  Close  Post-procedure details:     Dressing:  Antibiotic ointment and sterile dressing    Patient tolerance of procedure: Tolerated well, no immediate complications      Please see DR. Mckeon's note for complete HPI/ROS/PE/MDM/Final disposition.         Daniel Huerta, [address]  08/12/18 5992
anticipatory guidance was discussed. Tdap administered. Advised to follow up with PCP within one week for suture removal. Oh patient instructions provided. Advised patient to complete clindamycin and doxycycline as directed given her penicillin allergy. All questions answered prior stable discharge. CRITICAL CARE:   None     CONSULTS:  None    PROCEDURES:  Laceration repaired by Juan Miguel Ventura PA-C. FINAL IMPRESSION      1. Laceration of right middle finger without damage to nail, initial encounter          DISPOSITION/PLAN   Discharge    PATIENT REFERRED TO:  Sammie Kwok [facility]  [phone]    Schedule an appointment as soon as possible for a visit in 1 week  For suture removal      DISCHARGE MEDICATIONS:  Discharge Medication List as of 8/12/2018  4:45 PM      START taking these medications    Details   doxycycline hyclate (VIBRA-TABS) 100 MG tablet Take 1 tablet by mouth 2 times daily for 10 days, Disp-20 tablet, R-0Print      clindamycin (CLEOCIN) 300 MG capsule Take 1 capsule by mouth 3 times daily for 10 days, Disp-30 capsule, R-0Print             (Please note that portions of this note were completed with a voice recognition program.  Efforts were made to edit the dictations but occasionally words are mis-transcribed.)    Scribe:  Itz Park 8/12/18 5:22 PM Scribing for and in the presence of Luis Tompkins MD.    Signed by: Faviola Jones, 8/12/18 12:48 PM    Provider:  I personally performed the services described in the documentation, reviewed and edited the documentation which was dictated to the scribe in my presence, and it accurately records my words and actions.     Luis Tompkins MD 08/13/18 12:48 PM     Luis Tompkins MD 8/12/18 12:48 PM                           Luis Tompkins MD  08/13/18 [id number]

## 2018-08-13 ENCOUNTER — CARE COORDINATION (OUTPATIENT)
Dept: CARE COORDINATION | Age: 63
End: 2018-08-13

## 2018-08-13 ENCOUNTER — OFFICE VISIT (OUTPATIENT)
Dept: PSYCHOLOGY | Age: 63
End: 2018-08-13
Payer: COMMERCIAL

## 2018-08-13 DIAGNOSIS — K58.1 IRRITABLE BOWEL SYNDROME WITH CONSTIPATION: ICD-10-CM

## 2018-08-13 DIAGNOSIS — I63.00 CEREBRAL INFARCTION DUE TO THROMBOSIS OF PRECEREBRAL ARTERY (HCC): Primary | Chronic | ICD-10-CM

## 2018-08-13 DIAGNOSIS — F33.0 MILD EPISODE OF RECURRENT MAJOR DEPRESSIVE DISORDER (HCC): ICD-10-CM

## 2018-08-13 PROCEDURE — 90834 PSYTX W PT 45 MINUTES: CPT | Performed by: PSYCHOLOGIST

## 2018-08-13 NOTE — CARE COORDINATION
Ambulatory Care Coordination Note  8/13/2018  CM Risk Score: 10  Jd Mortality Risk Score:      ACC: Esther Jeong, RN    Summary Note: Spoke with Reford Nails. Went to ED yesterday for dog bite. Sutured laceration. Taking antibiotics. Discussed reporting signs of infection. Discussed keeping hand elevated above heart. Scheduled follow up with PCP for suture removal. States she is waiting to see Dr. Angeline Chicas. Encouraged to call with any problems, questions, concerns. Care Coordination Interventions    Program Enrollment:  Complex Care  Referral from Primary Care Provider:  No  Suggested Interventions and Community Resources  Behavorial Health:  Completed  Diabetes Education:  Completed  Physical Therapy:  Completed         Goals Addressed             Most Recent     Medication Management   On track (8/13/2018)             I will take my medication as directed. I will notify my provider of any problems with medications, like adverse effects or side effects. I will notify my provider/Care Coordinator if I am unable to afford my medications. I will notify my provider for advice before I stop taking any of my medication. Barriers: impairment:  cognitive  Plan for overcoming my barriers:  manages medications  Confidence: 9/10  Anticipated Goal Completion Date: ongoing            Prior to Admission medications    Medication Sig Start Date End Date Taking?  Authorizing Provider   doxycycline hyclate (VIBRA-TABS) 100 MG tablet Take 1 tablet by mouth 2 times daily for 10 days 8/12/18 8/22/18 Yes Azeem Sosa MD   clindamycin (CLEOCIN) 300 MG capsule Take 1 capsule by mouth 3 times daily for 10 days 8/12/18 8/22/18 Yes Azeem Sosa MD   predniSONE (DELTASONE) 20 MG tablet Take 2 tablets by mouth daily 8/10/18  Yes Marcellus Bee MD   amLODIPine (NORVASC) 10 MG tablet Take 1 tablet by mouth daily 7/26/18  Yes Gianluca Brooks MD   omeprazole (PRILOSEC) 20 MG delayed release capsule TK 1 C PO QD 5/7/18  Yes Historical Provider, MD   clopidogrel (PLAVIX) 75 MG tablet TAKE 1 TABLET DAILY 7/16/18  Yes Shreyas Montana MD   traZODone (DESYREL) 50 MG tablet TAKE 1 TABLET NIGHTLY 7/16/18  Yes Shreyas Montana MD   simvastatin (ZOCOR) 20 MG tablet TAKE 1 TABLET DAILY 7/16/18  Yes Shreyas Montana MD   buPROPion [facility] SR) 150 MG extended release tablet TAKE 1 TABLET TWICE A DAY 7/16/18  Yes Shreyas Montana MD   chlordiazePOXIDE-clidinium (LIBRAX) 5-2.5 MG per capsule TAKE 1 CAPSULE TWICE A DAY AS NEEDED FOR PAIN 5/31/18  Yes Shreyas Montana MD   ranitidine (ZANTAC) 150 MG tablet Take 1 tablet by mouth 2 times daily 3/15/18  Yes Yaneli Whiting MD   folic acid (FOLVITE) 585 MCG tablet Take 400 mcg by mouth daily   Yes Historical Provider, MD   hydrocortisone (ANUSOL-HC) 25 MG suppository Place 1 suppository rectally every 12 hours 12/28/17  Yes Yanlei Whiting MD   loratadine (CLARITIN) 10 MG tablet Take 1 tablet by mouth daily 9/27/17  Yes Yaneli Whiting MD   b complex vitamins capsule Take 1 capsule by mouth daily 6/2/16  Yes Trinity Diego MD   diclofenac 2 % SOLN Place onto the skin as needed   Yes Historical Provider, MD   Probiotic Product (PROBIOTIC DAILY PO) Take  by mouth. Yes Historical Provider, MD   FISH OIL 1,000 mg by Does not apply route 2 times daily Indications: Decreased Energy    Yes Historical Provider, MD   aspirin 81 MG tablet Take 81 mg by mouth daily.      Yes Historical Provider, MD   linaclotide [employer]) 145 MCG capsule Take 1 capsule by mouth daily as needed (constipation) 4/3/18   Shreyas Montana MD   lactulose [facility]) 10 GM/15ML solution Take 15 mLs by mouth 2 times daily as needed (constipation) 4/3/18   Shreyas Montana MD       Future Appointments  Date Time Provider Jaya Chapman   8/21/2018 4:15 PM Shreyas Montana MD SRPX [facility] [place]   9/10/2018 10:00 AM Jeevan Alves, PhD Julián Kenny 10/8/2018 10:00 AM Collins Le PhD SRPX Psychol [address]   11/12/2018 10:00 AM Collins Le PhD NAYANAX Psychol [address]   12/6/2018 9:40 AM Lore Runner, MD [facility] [place]   12/10/2018 11:00 AM Collins Le PhD Gwyndojanel Coon   1/15/2019 9:45 AM Logan Portillo MD SRPX [facility] [place]   1/30/2019 2:30 PM Jayjay Escobar MD [place] [id number]

## 2018-08-13 NOTE — PROGRESS NOTES
[address] [facility]  [address]  [address] [place] [address]  [address]  Dept: [phone]  Dept Fax: [phone]: [phone]    Patient: Joselyn Reyna  Visit Date: 8/13/2018  Referring Provider:   No ref. provider found    SUBJECTIVE DATA     CHIEF COMPLAINT:    Chief Complaint   Patient presents with   [facility] Depression    Cerebrovascular Accident       Patient update:  Patient reports that yesterday she was bitten by the neighbor's dog, got 8 stitches at Urgent Care, and now her hand throbs. Everybody was really nice to her, feels grateful for that treatment. Priti Crook has been difficult about wanting \[facility]\"" to do more things around the house, and seems more stressed about having Tenzin's son David Pastrana living with them. Priti Crook is excessively wanting to take care of everything for David Pastrana, which is getting to be too much. They are working on setting some better boundaries, for example, not always taking David Pastrana along when they go out with friends for dinner. Patient got mad at [facility] criticism, overdid it, and injured her shoulder. She has given Priti Crook an ultimatum about his behavior, and he has admitted he was being too critical. We did some problem-solving about how to intervene. Both of them have been suffering from recurring medical problems, which has really stressed them and their marriage. Patient was shaking in the session. Says it's \"nerves\" from the dog bite yesterday. Continues to do some leadership in the depression support group. OBJECTIVE DATA     Physical Exam:    Mental Status Evaluation:   Orientation: Alert, oriented. Mood:. Anxious      Affect:  normal and anxious. Appearance:  Normal--has lost a lot of weight in the past year   Activity:  Normal   Gait/Posture: Slow, small steps. Speech:  Normal   Thought Process:  within normal limits   Thought Content:   Within Normal Limits   Cognition:  Normal   Memory: Normal   Insight: good   Judgment: fair and Normal   Suicidal Ideations: Denies Suicidal Ideations   Homicidal Ideations: Denies Homicidal Ideations   Medication Side Effects: absent       Attention Span Normal     Screenings Completed in This Encounter:                                      DIAGNOSIS AND ASSESSMENT DATA     DIAGNOSIS:  History of depression and anxiety, old CVA    PLAN   Follow-up:  No Follow-up on file. Homework assignment before next session:     [] Practice deep breathing 1-2 times per day for 15 minutes, as demonstrated in session, and/or:    [] Go to [facility]\" web site and begin practicing with their free meditation podcasts    [x] Track thoughts that you think feed anxiety or depression    [] Go to [employer]\" web site, open up the \"Workbooks\" tab, and select a problem from the list that best suits your needs. Review the first module. [x] Begin to track physical activity. Even five minutes per day will be helpful.    [] Talk with your physician about whether it's OK to start fish oil (burpless) or flax oil, 1000 to 1200 mg per day    [x] Consider attending this support group: patient is already a major contributor to a local, patient-led depression group that meets at [facility]. [] Most importantly, remember this guideline: \"Don't believe everything you think! \"   :)    [] Try \"Expressive Writing\" using the guidelines on the handout    [x] Start yoga class, as discussed. [address], when feasible. Did PT first, and then went [facility]. Started some therapy. 2. Plan something positive with Carrie or Helen Talbot or another friend--something without Graham Argueta and something to look forward to. 3. Try to increase your caloric intake. 4. Do a little more each day to increase your strength   5. Discuss health concerns with Dr. Royal Coelho. 6. Look at adding in more iron-rich foods to your diet, since you've been running low.   7. Do the follow-up exercises as prescribed by the doctor    See Patient Instructions for additional homework. Session lasted 45 minutes.     Provider Signature:  Electronically signed by Ruth Koroma PhD on 8/13/2018 at 11:05 AM

## 2018-08-21 ENCOUNTER — OFFICE VISIT (OUTPATIENT)
Dept: FAMILY MEDICINE CLINIC | Age: 63
End: 2018-08-21
Payer: COMMERCIAL

## 2018-08-21 VITALS
HEIGHT: 60 IN | DIASTOLIC BLOOD PRESSURE: 64 MMHG | BODY MASS INDEX: 23.75 KG/M2 | HEART RATE: 76 BPM | WEIGHT: 121 LBS | SYSTOLIC BLOOD PRESSURE: 120 MMHG

## 2018-08-21 DIAGNOSIS — S61.210D LACERATION OF RIGHT INDEX FINGER WITHOUT FOREIGN BODY WITHOUT DAMAGE TO NAIL, SUBSEQUENT ENCOUNTER: Primary | ICD-10-CM

## 2018-08-21 PROCEDURE — 99213 OFFICE O/P EST LOW 20 MIN: CPT | Performed by: FAMILY MEDICINE

## 2018-08-21 ASSESSMENT — ENCOUNTER SYMPTOMS: COLOR CHANGE: 0

## 2018-08-21 NOTE — PROGRESS NOTES
Neurological: She is alert and oriented to person, place, and time. Skin: Skin is warm and dry. No rash noted. No erythema. Laceration in right 4th digit healing well. 8 sutures in place. Psychiatric: She has a normal mood and affect. Her behavior is normal.   Vitals reviewed. Assessment/Plan: Rosa Isela Estevez was seen today for suture / staple removal.    Diagnoses and all orders for this visit:    Laceration of right index finger without foreign body without damage to nail, subsequent encounter    Eight sutures were removed and the patient tolerated the procedure well. Wound was not well approximated so steri strips were applied. She was advised on aftercare instructions including signs of infection. Return if symptoms worsen or fail to improve.     Electronically signed by Amaury Moser MD on 8/21/2018 at 4:28 PM

## 2018-08-21 NOTE — PATIENT INSTRUCTIONS
which can slow healing. ¨ You may cover the cut with a thin layer of petroleum jelly, such as Vaseline, and a nonstick bandage. ¨ Apply more petroleum jelly and replace the bandage as needed. · Prop up the sore hand on a pillow anytime you sit or lie down during the next 3 days. Try to keep it above the level of your heart. This will help reduce swelling. · Avoid any activity that could cause your cut to reopen. · Do not remove the stitches on your own. Your doctor will tell you when to come back to have the stitches removed. · Be safe with medicines. Take pain medicines exactly as directed. ¨ If the doctor gave you a prescription medicine for pain, take it as prescribed. ¨ If you are not taking a prescription pain medicine, ask your doctor if you can take an over-the-counter medicine. When should you call for help? Call your doctor now or seek immediate medical care if:    · You have new pain, or your pain gets worse.     · The skin near the cut is cold or pale or changes color.     · You have tingling, weakness, or numbness near the cut.     · The cut starts to bleed, and blood soaks through the bandage. Oozing small amounts of blood is normal.     · You have trouble moving the area of the hand near the cut.     · You have symptoms of infection, such as:  ¨ Increased pain, swelling, warmth, or redness around the cut. ¨ Red streaks leading from the cut. ¨ Pus draining from the cut. ¨ A fever.    Watch closely for changes in your health, and be sure to contact your doctor if:    · You do not get better as expected. Where can you learn more? Go to https://wise.iopeFolkstr.[employer]. org and sign in to your [employer] account. Enter T250 in the [facility] box to learn more about \"Cuts on the Hand Closed With Stitches: Care Instructions. \"     If you do not have an account, please click on the \"Sign Up Now\" link.   Current as of: November 20, 2017  Content Version: 11.7  © 8066-3626 Healthwise, Incorporated. Care instructions adapted under license by [facility] ([facility]). If you have questions about a medical condition or this instruction, always ask your healthcare professional. [address] any warranty or liability for your use of this information.

## 2018-08-22 ENCOUNTER — OFFICE VISIT (OUTPATIENT)
Dept: PSYCHOLOGY | Age: 63
End: 2018-08-22
Payer: COMMERCIAL

## 2018-08-22 DIAGNOSIS — K58.1 IRRITABLE BOWEL SYNDROME WITH CONSTIPATION: ICD-10-CM

## 2018-08-22 DIAGNOSIS — I63.00 CEREBRAL INFARCTION DUE TO THROMBOSIS OF PRECEREBRAL ARTERY (HCC): Primary | Chronic | ICD-10-CM

## 2018-08-22 DIAGNOSIS — F33.0 MILD EPISODE OF RECURRENT MAJOR DEPRESSIVE DISORDER (HCC): ICD-10-CM

## 2018-08-22 PROCEDURE — 90834 PSYTX W PT 45 MINUTES: CPT | Performed by: PSYCHOLOGIST

## 2018-08-22 NOTE — PATIENT INSTRUCTIONS
1. Make an appointment with your  to develop a plan  2. Ask trainers at [employer] to tailor a fitness plan to your needs  3. Consider weaning off the Librax (gradually, as discussed).   4. Work on acceptance that [employer] journey is his journey

## 2018-08-30 ENCOUNTER — OFFICE VISIT (OUTPATIENT)
Dept: PSYCHOLOGY | Age: 63
End: 2018-08-30
Payer: COMMERCIAL

## 2018-08-30 DIAGNOSIS — F33.0 MILD EPISODE OF RECURRENT MAJOR DEPRESSIVE DISORDER (HCC): Primary | ICD-10-CM

## 2018-08-30 DIAGNOSIS — K58.9 IRRITABLE BOWEL SYNDROME WITHOUT DIARRHEA: ICD-10-CM

## 2018-08-30 DIAGNOSIS — F41.9 ANXIETY: ICD-10-CM

## 2018-08-30 DIAGNOSIS — K58.1 IRRITABLE BOWEL SYNDROME WITH CONSTIPATION: ICD-10-CM

## 2018-08-30 PROCEDURE — 90834 PSYTX W PT 45 MINUTES: CPT | Performed by: PSYCHOLOGIST

## 2018-08-30 RX ORDER — CHLORDIAZEPOXIDE HYDROCHLORIDE AND CLIDINIUM BROMIDE 5; 2.5 MG/1; MG/1
CAPSULE ORAL
Qty: 180 CAPSULE | Refills: 0 | Status: SHIPPED | OUTPATIENT
Start: 2018-08-30 | End: 2018-11-28 | Stop reason: SDUPTHER

## 2018-08-30 NOTE — PATIENT INSTRUCTIONS
1. Get Brandin Mcgrath to [facility] for substance abuse treatment (consider Campral or whatever they recommend). 2. Look for [facility] meeting to get some support and information. 3. Get started back at [facility] when possible  4. Ask about [employer] workshops.

## 2018-08-30 NOTE — PROGRESS NOTES
1125 [facility]  [address]  [address] [place] [address]  [address]  Dept: [phone]  Dept Fax: [phone]: [phone]    Patient: Kalee Hunt  Visit Date: 8/30/2018  Referring Provider:   No ref. provider found    SUBJECTIVE DATA     CHIEF COMPLAINT:    Chief Complaint   Patient presents with    Anxiety    Depression    Stress       Patient update:  Patient reports that her Carri Chu has gone on a huge drinking binge, distressed about seeing a \"mean and angry\" side of Jonathan Burton that [facility] has never seen before. Someone called the police on Jonathan Burton, and they let him come back to [facility] with [facility] and Lori Aguirre. They have talked with Dr. Sekou Lindsey about possible treatment options. Continues to be very stressed about money issues, since Lori Aguirre has been taking a lot of money out of their detention. She met with their . She feels resentful of his telling her she can't spend, when he continues to smoke two packs a day. Able to provide reasoned plans for reducing expenses. Continues her active involvement in the depression support group. Enjoys how much progress many of the group members have been making when they go to the [facility]. Feeling very stressed about all the issues with Jonathan Burton. Also has friends who are going through a lot of crises, which she knows she cannot tackle right now. Despite that is managing her bowels relatively well. Has cut down on her Librax and plans to eventually reduce it to every other day. OBJECTIVE DATA     Physical Exam:    Mental Status Evaluation:   Orientation: Alert, oriented. Mood:. Anxious      Affect:  normal and anxious. Appearance:  Normal--has lost a lot of weight in the past year   Activity:  Normal   Gait/Posture: Slow, small steps. Speech:  Normal   Thought Process:  within normal limits   Thought Content:   Within Normal Limits   Cognition:  Normal   Memory: Normal   Insight:  good for additional homework. Session lasted 43 minutes.     Provider Signature:  Electronically signed by Oneil Galindo, PhD on 8/30/2018 at 12:09 PM

## 2018-09-04 DIAGNOSIS — K58.1 IRRITABLE BOWEL SYNDROME WITH CONSTIPATION: ICD-10-CM

## 2018-09-04 NOTE — TELEPHONE ENCOUNTER
Pardeep Fajardo called requesting a refill on the following medications:  Requested Prescriptions     Pending Prescriptions Disp Refills    linaclotide (LINZESS) 145 MCG capsule 30 capsule 2     Sig: Take 1 capsule by mouth daily as needed (constipation)       Date of last visit: 8/21/2018  Date of next visit (if applicable):1/15/2019  Date of last refill: 04/03/2018  Pharmacy Name: [facility],  Eduardo Villalta, [address] [place] [address]

## 2018-09-10 ENCOUNTER — OFFICE VISIT (OUTPATIENT)
Dept: PSYCHOLOGY | Age: 63
End: 2018-09-10
Payer: COMMERCIAL

## 2018-09-10 ENCOUNTER — TELEPHONE (OUTPATIENT)
Dept: FAMILY MEDICINE CLINIC | Age: 63
End: 2018-09-10

## 2018-09-10 DIAGNOSIS — I63.00 CEREBRAL INFARCTION DUE TO THROMBOSIS OF PRECEREBRAL ARTERY (HCC): Chronic | ICD-10-CM

## 2018-09-10 DIAGNOSIS — K58.1 IRRITABLE BOWEL SYNDROME WITH CONSTIPATION: Primary | ICD-10-CM

## 2018-09-10 DIAGNOSIS — F33.0 MILD EPISODE OF RECURRENT MAJOR DEPRESSIVE DISORDER (HCC): ICD-10-CM

## 2018-09-10 DIAGNOSIS — F41.9 ANXIETY: ICD-10-CM

## 2018-09-10 PROCEDURE — 90834 PSYTX W PT 45 MINUTES: CPT | Performed by: PSYCHOLOGIST

## 2018-09-10 ASSESSMENT — ANXIETY QUESTIONNAIRES
GAD7 TOTAL SCORE: 9
1. FEELING NERVOUS, ANXIOUS, OR ON EDGE: 2-OVER HALF THE DAYS
3. WORRYING TOO MUCH ABOUT DIFFERENT THINGS: 1-SEVERAL DAYS
6. BECOMING EASILY ANNOYED OR IRRITABLE: 1-SEVERAL DAYS
2. NOT BEING ABLE TO STOP OR CONTROL WORRYING: 3-NEARLY EVERY DAY
4. TROUBLE RELAXING: 1-SEVERAL DAYS
7. FEELING AFRAID AS IF SOMETHING AWFUL MIGHT HAPPEN: 1-SEVERAL DAYS
5. BEING SO RESTLESS THAT IT IS HARD TO SIT STILL: 0-NOT AT ALL SURE

## 2018-09-10 NOTE — PROGRESS NOTES
1125 [facility]  [address]  [address] [place] [address]  [address]  Dept: [phone]  Dept Fax: [phone]: [phone]    Patient: Tiffanie Ruano  Visit Date: 9/10/2018  Referring Provider:   No ref. provider found    SUBJECTIVE DATA     CHIEF COMPLAINT:    Chief Complaint   Patient presents with    Anxiety    Depression    Stress       Patient update:  Patient reports that her bowels are very distressing. Has not had a bowel since last Wednesday (five days ago). Plans to do an enema today. Has a lot of distress and worries about that. Has been handling a good deal of stress, including being overcharged for beauty supplies that she didn't order. That makes her much more susceptible to constipation. Karmen Chappell has been looking for an apartment, and finally found one. Patient and  helped him clean the apartment and get it ready. The family has been chipping in and giving him some dishes and furniture, so that is a big step. He is supposed to move in on Wednesday, and they are all ready for that. She is planning to attend [facility] meeting to learn more about how to deal with the stress. Continues her active involvement in the depression support group. Enjoys how much progress many of the group members have been making when they go to the [employer]. Wants to start there, herself, in October. Doing somewhat better, functionally. Expecting some friends to visit this weekend, from [place]. Continues to do meditation and self-hypnosis. Wanting to wean off Librax, over time. Has already cut back to one pill a day, and plans to start with every other day. OBJECTIVE DATA     Physical Exam:    Mental Status Evaluation:   Orientation: Alert, oriented. Mood:. Anxious      Affect:  normal and anxious. Appearance:  Normal--has lost a lot of weight in the past year   Activity:  Normal   Gait/Posture: Slow, small steps.    Speech: Normal   Thought Process:  within normal limits   Thought Content: Within Normal Limits   Cognition:  Normal   Memory: Normal   Insight:  good   Judgment: fair and Normal   Suicidal Ideations: Denies Suicidal Ideations   Homicidal Ideations: Denies Homicidal Ideations   Medication Side Effects: absent       Attention Span Normal     Screenings Completed in This Encounter:                                      DIAGNOSIS AND ASSESSMENT DATA     DIAGNOSIS:  History of depression and anxiety, old CVA    PLAN   Follow-up:  No Follow-up on file. Homework assignment before next session:     [] Practice deep breathing 1-2 times per day for 15 minutes, as demonstrated in session, and/or:    [] Go to [facility]\" web site and begin practicing with their free meditation podcasts    [x] Track thoughts that you think feed anxiety or depression    [] Go to [employer]\" web site, open up the \"Workbooks\" tab, and select a problem from the list that best suits your needs. Review the first module. [x] Begin to track physical activity. Even five minutes per day will be helpful.    [] Talk with your physician about whether it's OK to start fish oil (burpless) or flax oil, 1000 to 1200 mg per day    [x] Consider attending this support group: patient is already a major contributor to a local, patient-led depression group that meets at [facility]. [] Most importantly, remember this guideline: \"Don't believe everything you think! \"   :)    [] Try \"Expressive Writing\" using the guidelines on the handout    [x] Start yoga class, as discussed. [address], when feasible. Let the trainers tailor a program to your needs. 2. Plan something positive with Carrie or Eduard Bailey or another friend--something without Iesha Shoulder and something to look forward to.  3. Consider weaning off the Librax, in consultation with your physician.   4. Assert yourself when it comes to Iesha Shoulder and the finances. 5. Practice daily IBS hypnosis, again. 6. Go to [facility] for support and information  7. Ask about [facility] Sch[employer]er workshops    See Patient Instructions for additional homework. Session lasted 45 minutes.     Provider Signature:  Electronically signed by Tonia Hogue, PhD on 9/10/2018 at 10:00 AM

## 2018-09-10 NOTE — TELEPHONE ENCOUNTER
Colton Cortez called in stating that she has been having bowel issues again and is asking for a RX for Bedside commode as sometimes she has difficulty making it to the bathroom for a BM. I have it set for you to print and sign. We are to call her back.

## 2018-09-13 ENCOUNTER — TELEPHONE (OUTPATIENT)
Dept: NEPHROLOGY | Age: 63
End: 2018-09-13

## 2018-09-13 DIAGNOSIS — N18.30 CHRONIC KIDNEY DISEASE, STAGE III (MODERATE) (HCC): Primary | ICD-10-CM

## 2018-09-13 DIAGNOSIS — N39.0 URINARY TRACT INFECTION WITHOUT HEMATURIA, SITE UNSPECIFIED: ICD-10-CM

## 2018-09-14 ENCOUNTER — HOSPITAL ENCOUNTER (OUTPATIENT)
Age: 63
Discharge: HOME | End: 2018-09-14
Payer: COMMERCIAL

## 2018-09-14 ENCOUNTER — CARE COORDINATION (OUTPATIENT)
Dept: CARE COORDINATION | Age: 63
End: 2018-09-14

## 2018-09-14 DIAGNOSIS — N39.0 URINARY TRACT INFECTION WITHOUT HEMATURIA, SITE UNSPECIFIED: ICD-10-CM

## 2018-09-14 DIAGNOSIS — N18.30 CHRONIC KIDNEY DISEASE, STAGE III (MODERATE) (HCC): ICD-10-CM

## 2018-09-14 LAB
ANION GAP SERPL CALCULATED.3IONS-SCNC: 16 MEQ/L (ref 8–16)
BACTERIA: ABNORMAL /HPF
BILIRUBIN URINE: NEGATIVE
BLOOD, URINE: NEGATIVE
BUN BLDV-MCNC: 39 MG/DL (ref 7–22)
CALCIUM SERPL-MCNC: 9.1 MG/DL (ref 8.5–10.5)
CASTS 2: ABNORMAL /LPF
CASTS UA: ABNORMAL /LPF
CHARACTER, URINE: CLEAR
CHLORIDE BLD-SCNC: 98 MEQ/L (ref 98–111)
CO2: 24 MEQ/L (ref 23–33)
COLOR: YELLOW
CREAT SERPL-MCNC: 2 MG/DL (ref 0.4–1.2)
CRYSTALS, UA: ABNORMAL
EPITHELIAL CELLS, UA: ABNORMAL /HPF
GFR SERPL CREATININE-BSD FRML MDRD: 25 ML/MIN/1.73M2
GLUCOSE BLD-MCNC: 103 MG/DL (ref 70–108)
GLUCOSE URINE: NEGATIVE MG/DL
KETONES, URINE: NEGATIVE
LEUKOCYTE ESTERASE, URINE: ABNORMAL
MISCELLANEOUS 2: ABNORMAL
NITRITE, URINE: NEGATIVE
PH UA: 6
POTASSIUM SERPL-SCNC: 4.4 MEQ/L (ref 3.5–5.2)
PROTEIN UA: NEGATIVE
RBC URINE: ABNORMAL /HPF
RENAL EPITHELIAL, UA: ABNORMAL
SODIUM BLD-SCNC: 138 MEQ/L (ref 135–145)
SPECIFIC GRAVITY, URINE: 1.01 (ref 1–1.03)
UROBILINOGEN, URINE: 0.2 EU/DL
WBC UA: ABNORMAL /HPF
YEAST: ABNORMAL

## 2018-09-14 PROCEDURE — 81001 URINALYSIS AUTO W/SCOPE: CPT

## 2018-09-14 PROCEDURE — 36415 COLL VENOUS BLD VENIPUNCTURE: CPT

## 2018-09-14 PROCEDURE — 87086 URINE CULTURE/COLONY COUNT: CPT

## 2018-09-14 PROCEDURE — 80048 BASIC METABOLIC PNL TOTAL CA: CPT

## 2018-09-14 NOTE — CARE COORDINATION
Ambulatory Care Coordination Note  9/14/2018  CM Risk Score: 10  Jd Mortality Risk Score:      ACC: Kashif Shah RN    Summary Note: Spoke with patient. Has some added stress with  being on heart monitor currently. Getting hair done today and looking forward. Has diarrhea currently. Goes between constipation and diarrhea. Gets instructions from Dr. Kayla Dixon. Has some lab work today for kidneys that is also concerning her. Encouraged stress relievers such as exercise. States she get Silver Sneakers covered after her birthday 10/11/18. Encouraged to start daily exercise today. States her memory is bad at times and that worries her. Had memory testing by neuro recently. Discussed using brain teaser exercises on computer. Familiar. Encouraged to call with any problems, questions, concerns. Care Coordination Interventions    Program Enrollment:  Complex Care  Referral from Primary Care Provider:  No  Suggested Interventions and Community Resources  Behavorial Health:  Completed  Diabetes Education:  Completed  Physical Therapy:  Completed         Goals Addressed             Most Recent     Medication Management   On track (9/14/2018)             I will take my medication as directed. I will notify my provider of any problems with medications, like adverse effects or side effects. I will notify my provider/Care Coordinator if I am unable to afford my medications. I will notify my provider for advice before I stop taking any of my medication. Barriers: impairment:  cognitive  Plan for overcoming my barriers:  manages medications  Confidence: 9/10  Anticipated Goal Completion Date: ongoing            Prior to Admission medications    Medication Sig Start Date End Date Taking?  Authorizing Provider   chlordiazePOXIDE-clidinium (LIBRAX) 5-2.5 MG per capsule TAKE 1 CAPSULE TWICE A DAY AS NEEDED FOR PAIN 8/30/18  Yes Rai Ferrell MD   amLODIPine (NORVASC) 10 MG tablet Nathaniel Jaime, PhD [facility] Psychol [address]   12/6/2018 9:40 AM Mike Palacio MD [facility] AM [place] [facility]   12/10/2018 11:00 AM Nathaniel Jaime, PhD [facility]   1/15/2019 9:45 AM Binta Tony MD SRPX [place] [place]   1/30/2019 2:30 PM Kelsy Tay MD [place] [address]

## 2018-09-16 LAB
ORGANISM: ABNORMAL
URINE CULTURE REFLEX: ABNORMAL

## 2018-09-17 DIAGNOSIS — K21.9 GASTROESOPHAGEAL REFLUX DISEASE WITHOUT ESOPHAGITIS: ICD-10-CM

## 2018-09-17 RX ORDER — RANITIDINE 150 MG/1
150 TABLET ORAL 2 TIMES DAILY
Qty: 60 TABLET | Refills: 3 | Status: SHIPPED | OUTPATIENT
Start: 2018-09-17 | End: 2019-01-15 | Stop reason: SDUPTHER

## 2018-09-17 NOTE — TELEPHONE ENCOUNTER
Requested Prescriptions     Pending Prescriptions Disp Refills    ranitidine (ZANTAC) 150 MG tablet 60 tablet 3     Sig: Take 1 tablet by mouth 2 times daily   Please send to [facility]

## 2018-10-01 ENCOUNTER — OFFICE VISIT (OUTPATIENT)
Dept: PSYCHOLOGY | Age: 63
End: 2018-10-01
Payer: MEDICARE

## 2018-10-01 DIAGNOSIS — K58.1 IRRITABLE BOWEL SYNDROME WITH CONSTIPATION: ICD-10-CM

## 2018-10-01 DIAGNOSIS — F33.0 MILD EPISODE OF RECURRENT MAJOR DEPRESSIVE DISORDER (HCC): Primary | ICD-10-CM

## 2018-10-01 PROCEDURE — 90834 PSYTX W PT 45 MINUTES: CPT | Performed by: PSYCHOLOGIST

## 2018-10-02 ENCOUNTER — OFFICE VISIT (OUTPATIENT)
Dept: FAMILY MEDICINE CLINIC | Age: 63
End: 2018-10-02
Payer: MEDICARE

## 2018-10-02 ENCOUNTER — HOSPITAL ENCOUNTER (OUTPATIENT)
Age: 63
Discharge: HOME | End: 2018-10-02
Payer: MEDICARE

## 2018-10-02 VITALS
BODY MASS INDEX: 24.54 KG/M2 | HEIGHT: 60 IN | HEART RATE: 60 BPM | DIASTOLIC BLOOD PRESSURE: 74 MMHG | SYSTOLIC BLOOD PRESSURE: 110 MMHG | WEIGHT: 125 LBS

## 2018-10-02 DIAGNOSIS — R30.0 DYSURIA: Primary | ICD-10-CM

## 2018-10-02 DIAGNOSIS — Z23 NEED FOR INFLUENZA VACCINATION: ICD-10-CM

## 2018-10-02 LAB
ANION GAP SERPL CALCULATED.3IONS-SCNC: 15 MEQ/L (ref 8–16)
BILIRUBIN, POC: NEGATIVE
BLOOD URINE, POC: NEGATIVE
BUN BLDV-MCNC: 36 MG/DL (ref 7–22)
CALCIUM SERPL-MCNC: 9.6 MG/DL (ref 8.5–10.5)
CHLORIDE BLD-SCNC: 106 MEQ/L (ref 98–111)
CLARITY, POC: CLEAR
CO2: 22 MEQ/L (ref 23–33)
COLOR, POC: YELLOW
CREAT SERPL-MCNC: 1.7 MG/DL (ref 0.4–1.2)
GFR SERPL CREATININE-BSD FRML MDRD: 30 ML/MIN/1.73M2
GLUCOSE BLD-MCNC: 93 MG/DL (ref 70–108)
GLUCOSE URINE, POC: NEGATIVE
KETONES, POC: NEGATIVE
LEUKOCYTE EST, POC: NEGATIVE
NITRITE, POC: NEGATIVE
PH, POC: 5.5
POTASSIUM SERPL-SCNC: 4.1 MEQ/L (ref 3.5–5.2)
PROTEIN, POC: NEGATIVE
SODIUM BLD-SCNC: 143 MEQ/L (ref 135–145)
SPECIFIC GRAVITY, POC: 1.02
UROBILINOGEN, POC: 0.2

## 2018-10-02 PROCEDURE — G8420 CALC BMI NORM PARAMETERS: HCPCS | Performed by: FAMILY MEDICINE

## 2018-10-02 PROCEDURE — G8427 DOCREV CUR MEDS BY ELIG CLIN: HCPCS | Performed by: FAMILY MEDICINE

## 2018-10-02 PROCEDURE — G8598 ASA/ANTIPLAT THER USED: HCPCS | Performed by: FAMILY MEDICINE

## 2018-10-02 PROCEDURE — 81002 URINALYSIS NONAUTO W/O SCOPE: CPT | Performed by: FAMILY MEDICINE

## 2018-10-02 PROCEDURE — 36415 COLL VENOUS BLD VENIPUNCTURE: CPT

## 2018-10-02 PROCEDURE — 3017F COLORECTAL CA SCREEN DOC REV: CPT | Performed by: FAMILY MEDICINE

## 2018-10-02 PROCEDURE — G8482 FLU IMMUNIZE ORDER/ADMIN: HCPCS | Performed by: FAMILY MEDICINE

## 2018-10-02 PROCEDURE — 99213 OFFICE O/P EST LOW 20 MIN: CPT | Performed by: FAMILY MEDICINE

## 2018-10-02 PROCEDURE — 90688 IIV4 VACCINE SPLT 0.5 ML IM: CPT | Performed by: FAMILY MEDICINE

## 2018-10-02 PROCEDURE — 80048 BASIC METABOLIC PNL TOTAL CA: CPT

## 2018-10-02 PROCEDURE — 1036F TOBACCO NON-USER: CPT | Performed by: FAMILY MEDICINE

## 2018-10-02 PROCEDURE — G0008 ADMIN INFLUENZA VIRUS VAC: HCPCS | Performed by: FAMILY MEDICINE

## 2018-10-02 RX ORDER — PHENAZOPYRIDINE HYDROCHLORIDE 95 MG/1
95 TABLET ORAL 3 TIMES DAILY PRN
Qty: 10 TABLET | Refills: 0 | Status: SHIPPED | OUTPATIENT
Start: 2018-10-02 | End: 2018-10-05

## 2018-10-02 ASSESSMENT — ENCOUNTER SYMPTOMS
CONSTIPATION: 1
DIARRHEA: 1
ABDOMINAL PAIN: 0
NAUSEA: 0
VOMITING: 0

## 2018-10-03 ENCOUNTER — TELEPHONE (OUTPATIENT)
Dept: NEPHROLOGY | Age: 63
End: 2018-10-03

## 2018-10-14 ENCOUNTER — HOSPITAL ENCOUNTER (EMERGENCY)
Age: 63
Discharge: HOME | End: 2018-10-14
Payer: MEDICARE

## 2018-10-14 ENCOUNTER — APPOINTMENT (OUTPATIENT)
Dept: GENERAL RADIOLOGY | Age: 63
End: 2018-10-14
Payer: MEDICARE

## 2018-10-14 VITALS
BODY MASS INDEX: 23.95 KG/M2 | RESPIRATION RATE: 16 BRPM | HEIGHT: 60 IN | OXYGEN SATURATION: 98 % | DIASTOLIC BLOOD PRESSURE: 65 MMHG | SYSTOLIC BLOOD PRESSURE: 102 MMHG | WEIGHT: 122 LBS | HEART RATE: 80 BPM | TEMPERATURE: 98.1 F

## 2018-10-14 DIAGNOSIS — M25.512 ACUTE PAIN OF LEFT SHOULDER: Primary | ICD-10-CM

## 2018-10-14 LAB — GLUCOSE BLD-MCNC: 74 MG/DL (ref 70–108)

## 2018-10-14 PROCEDURE — 82948 REAGENT STRIP/BLOOD GLUCOSE: CPT

## 2018-10-14 PROCEDURE — 99283 EMERGENCY DEPT VISIT LOW MDM: CPT

## 2018-10-14 PROCEDURE — 73030 X-RAY EXAM OF SHOULDER: CPT

## 2018-10-14 RX ORDER — METHYLPREDNISOLONE 4 MG/1
TABLET ORAL
Qty: 1 KIT | Refills: 0 | Status: SHIPPED | OUTPATIENT
Start: 2018-10-14 | End: 2018-10-20

## 2018-10-14 RX ORDER — TRAMADOL HYDROCHLORIDE 50 MG/1
50 TABLET ORAL EVERY 6 HOURS PRN
Qty: 12 TABLET | Refills: 0 | Status: SHIPPED | OUTPATIENT
Start: 2018-10-14 | End: 2018-11-01 | Stop reason: SDUPTHER

## 2018-10-14 ASSESSMENT — PAIN SCALES - GENERAL: PAINLEVEL_OUTOF10: 9

## 2018-10-14 ASSESSMENT — ENCOUNTER SYMPTOMS
RHINORRHEA: 0
ABDOMINAL PAIN: 0
NAUSEA: 0
SORE THROAT: 0
COUGH: 0
WHEEZING: 0
BACK PAIN: 0
EYE PAIN: 0
EYE DISCHARGE: 0
DIARRHEA: 0
SHORTNESS OF BREATH: 0
VOMITING: 0

## 2018-10-14 ASSESSMENT — PAIN DESCRIPTION - ORIENTATION: ORIENTATION: LEFT

## 2018-10-14 ASSESSMENT — PAIN DESCRIPTION - PAIN TYPE: TYPE: ACUTE PAIN

## 2018-10-14 ASSESSMENT — PAIN DESCRIPTION - DESCRIPTORS: DESCRIPTORS: ACHING

## 2018-10-14 ASSESSMENT — PAIN DESCRIPTION - LOCATION: LOCATION: SHOULDER

## 2018-10-15 ENCOUNTER — CARE COORDINATION (OUTPATIENT)
Dept: CARE COORDINATION | Age: 63
End: 2018-10-15

## 2018-10-22 ENCOUNTER — HOSPITAL ENCOUNTER (OUTPATIENT)
Age: 63
Discharge: HOME | End: 2018-10-22
Payer: MEDICARE

## 2018-10-22 LAB — TSH SERPL DL<=0.05 MIU/L-ACNC: 1.28 UIU/ML (ref 0.4–4.2)

## 2018-10-22 PROCEDURE — 84443 ASSAY THYROID STIM HORMONE: CPT

## 2018-10-22 PROCEDURE — 36415 COLL VENOUS BLD VENIPUNCTURE: CPT

## 2018-11-01 ENCOUNTER — OFFICE VISIT (OUTPATIENT)
Dept: FAMILY MEDICINE CLINIC | Age: 63
End: 2018-11-01
Payer: MEDICARE

## 2018-11-01 VITALS
BODY MASS INDEX: 24.11 KG/M2 | WEIGHT: 122.8 LBS | SYSTOLIC BLOOD PRESSURE: 128 MMHG | DIASTOLIC BLOOD PRESSURE: 60 MMHG | HEIGHT: 60 IN | HEART RATE: 76 BPM

## 2018-11-01 DIAGNOSIS — M75.82 ROTATOR CUFF TENDONITIS, LEFT: Primary | ICD-10-CM

## 2018-11-01 DIAGNOSIS — M25.512 ACUTE PAIN OF LEFT SHOULDER: ICD-10-CM

## 2018-11-01 PROCEDURE — G8427 DOCREV CUR MEDS BY ELIG CLIN: HCPCS | Performed by: FAMILY MEDICINE

## 2018-11-01 PROCEDURE — G8420 CALC BMI NORM PARAMETERS: HCPCS | Performed by: FAMILY MEDICINE

## 2018-11-01 PROCEDURE — 99213 OFFICE O/P EST LOW 20 MIN: CPT | Performed by: FAMILY MEDICINE

## 2018-11-01 PROCEDURE — 1101F PT FALLS ASSESS-DOCD LE1/YR: CPT | Performed by: FAMILY MEDICINE

## 2018-11-01 PROCEDURE — 1036F TOBACCO NON-USER: CPT | Performed by: FAMILY MEDICINE

## 2018-11-01 PROCEDURE — 1123F ACP DISCUSS/DSCN MKR DOCD: CPT | Performed by: FAMILY MEDICINE

## 2018-11-01 PROCEDURE — 3017F COLORECTAL CA SCREEN DOC REV: CPT | Performed by: FAMILY MEDICINE

## 2018-11-01 PROCEDURE — G8598 ASA/ANTIPLAT THER USED: HCPCS | Performed by: FAMILY MEDICINE

## 2018-11-01 PROCEDURE — G8482 FLU IMMUNIZE ORDER/ADMIN: HCPCS | Performed by: FAMILY MEDICINE

## 2018-11-01 PROCEDURE — 1090F PRES/ABSN URINE INCON ASSESS: CPT | Performed by: FAMILY MEDICINE

## 2018-11-01 PROCEDURE — G8400 PT W/DXA NO RESULTS DOC: HCPCS | Performed by: FAMILY MEDICINE

## 2018-11-01 PROCEDURE — 4040F PNEUMOC VAC/ADMIN/RCVD: CPT | Performed by: FAMILY MEDICINE

## 2018-11-01 RX ORDER — PREDNISONE 10 MG/1
10 TABLET ORAL 2 TIMES DAILY
Qty: 14 TABLET | Refills: 0 | Status: SHIPPED | OUTPATIENT
Start: 2018-11-01 | End: 2018-11-08

## 2018-11-01 RX ORDER — TRAMADOL HYDROCHLORIDE 50 MG/1
50 TABLET ORAL EVERY 6 HOURS PRN
Qty: 12 TABLET | Refills: 0 | Status: SHIPPED | OUTPATIENT
Start: 2018-11-01 | End: 2018-11-04

## 2018-11-12 ENCOUNTER — OFFICE VISIT (OUTPATIENT)
Dept: PSYCHOLOGY | Age: 63
End: 2018-11-12
Payer: MEDICARE

## 2018-11-12 DIAGNOSIS — F41.9 ANXIETY: ICD-10-CM

## 2018-11-12 DIAGNOSIS — K58.1 IRRITABLE BOWEL SYNDROME WITH CONSTIPATION: ICD-10-CM

## 2018-11-12 DIAGNOSIS — F33.0 MILD EPISODE OF RECURRENT MAJOR DEPRESSIVE DISORDER (HCC): Primary | ICD-10-CM

## 2018-11-12 PROCEDURE — 90834 PSYTX W PT 45 MINUTES: CPT | Performed by: PSYCHOLOGIST

## 2018-11-14 ENCOUNTER — OFFICE VISIT (OUTPATIENT)
Dept: FAMILY MEDICINE CLINIC | Age: 63
End: 2018-11-14
Payer: MEDICARE

## 2018-11-14 VITALS
HEIGHT: 62 IN | SYSTOLIC BLOOD PRESSURE: 110 MMHG | DIASTOLIC BLOOD PRESSURE: 80 MMHG | WEIGHT: 122 LBS | BODY MASS INDEX: 22.45 KG/M2

## 2018-11-14 DIAGNOSIS — M25.512 CHRONIC PAIN OF BOTH SHOULDERS: ICD-10-CM

## 2018-11-14 DIAGNOSIS — M25.511 CHRONIC PAIN OF BOTH SHOULDERS: ICD-10-CM

## 2018-11-14 DIAGNOSIS — M25.562 CHRONIC PAIN OF BOTH KNEES: ICD-10-CM

## 2018-11-14 DIAGNOSIS — M25.561 CHRONIC PAIN OF BOTH KNEES: ICD-10-CM

## 2018-11-14 DIAGNOSIS — G89.29 CHRONIC PAIN OF BOTH SHOULDERS: ICD-10-CM

## 2018-11-14 DIAGNOSIS — J01.90 ACUTE BACTERIAL SINUSITIS: ICD-10-CM

## 2018-11-14 DIAGNOSIS — B96.89 ACUTE BACTERIAL SINUSITIS: ICD-10-CM

## 2018-11-14 DIAGNOSIS — K59.09 CHRONIC CONSTIPATION: Primary | ICD-10-CM

## 2018-11-14 DIAGNOSIS — G89.29 CHRONIC PAIN OF BOTH KNEES: ICD-10-CM

## 2018-11-14 PROCEDURE — 4040F PNEUMOC VAC/ADMIN/RCVD: CPT | Performed by: FAMILY MEDICINE

## 2018-11-14 PROCEDURE — 1123F ACP DISCUSS/DSCN MKR DOCD: CPT | Performed by: FAMILY MEDICINE

## 2018-11-14 PROCEDURE — 1090F PRES/ABSN URINE INCON ASSESS: CPT | Performed by: FAMILY MEDICINE

## 2018-11-14 PROCEDURE — G8400 PT W/DXA NO RESULTS DOC: HCPCS | Performed by: FAMILY MEDICINE

## 2018-11-14 PROCEDURE — G8427 DOCREV CUR MEDS BY ELIG CLIN: HCPCS | Performed by: FAMILY MEDICINE

## 2018-11-14 PROCEDURE — 1101F PT FALLS ASSESS-DOCD LE1/YR: CPT | Performed by: FAMILY MEDICINE

## 2018-11-14 PROCEDURE — 3017F COLORECTAL CA SCREEN DOC REV: CPT | Performed by: FAMILY MEDICINE

## 2018-11-14 PROCEDURE — G8482 FLU IMMUNIZE ORDER/ADMIN: HCPCS | Performed by: FAMILY MEDICINE

## 2018-11-14 PROCEDURE — 1036F TOBACCO NON-USER: CPT | Performed by: FAMILY MEDICINE

## 2018-11-14 PROCEDURE — G8598 ASA/ANTIPLAT THER USED: HCPCS | Performed by: FAMILY MEDICINE

## 2018-11-14 PROCEDURE — 99214 OFFICE O/P EST MOD 30 MIN: CPT | Performed by: FAMILY MEDICINE

## 2018-11-14 PROCEDURE — G8420 CALC BMI NORM PARAMETERS: HCPCS | Performed by: FAMILY MEDICINE

## 2018-11-14 RX ORDER — AZITHROMYCIN 250 MG/1
TABLET, FILM COATED ORAL
Qty: 6 TABLET | Refills: 0 | Status: SHIPPED | OUTPATIENT
Start: 2018-11-14 | End: 2018-11-26 | Stop reason: ALTCHOICE

## 2018-11-14 ASSESSMENT — ENCOUNTER SYMPTOMS
NAUSEA: 0
VOMITING: 0
CHEST TIGHTNESS: 0
COLOR CHANGE: 0
EYE DISCHARGE: 0
RHINORRHEA: 1
DIARRHEA: 0
CONSTIPATION: 1
EYE REDNESS: 0
SHORTNESS OF BREATH: 0

## 2018-11-14 NOTE — PROGRESS NOTES
[address] [place] [address] [address], [address], [place]  Phone:  [phone]  LJT:[phone]       Name: Kori Romero  : 1955    Chief Complaint   Patient presents with    Other     questions about the MRI that GI has scheduled for her constipation        HPI:     HPI  Kori Romero is a 70 y.o. female who presents today for follow-up of constipation, shoulder pain, knee pain, and sinus symptoms. She's had constipation for years which is felt to be secondary to constipation-predominant IBS. Her last colonoscopy in May with Dr. Brenda Lucia was suggestive of the same. She continues to have issues and has to manually disimpact herself. When she saw NP Champ Pinzon in GI last month, she ordered an MRI defecography but Bhavna Recinos is uncertain if she should have it done. Bhavna Recinos has had chronic bilateral shoulder and knee pain. She recently had injections per Ortho and would like a referral to PT. Current Outpatient Prescriptions:     azithromycin (ZITHROMAX Z-NORBERTO) 250 MG tablet, Take two (2) tablets by mouth on day 1, then take one (1) tablet by mouth daily for four (4) days. , Disp: 6 tablet, Rfl: 0    Misc. Devices (COMMODE BEDSIDE) MISC, Dispense 1 beside commode., Disp: 1 each, Rfl: 0    ranitidine (ZANTAC) 150 MG tablet, Take 1 tablet by mouth 2 times daily, Disp: 60 tablet, Rfl: 3    Misc.  Devices (COMMODE BEDSIDE) MISC, ., Disp: 1 each, Rfl: 0    linaclotide (LINZESS) 145 MCG capsule, Take 1 capsule by mouth daily as needed (constipation), Disp: 30 capsule, Rfl: 2    chlordiazePOXIDE-clidinium (LIBRAX) 5-2.5 MG per capsule, TAKE 1 CAPSULE TWICE A DAY AS NEEDED FOR PAIN, Disp: 180 capsule, Rfl: 0    amLODIPine (NORVASC) 10 MG tablet, Take 1 tablet by mouth daily, Disp: 90 tablet, Rfl: 1    omeprazole (PRILOSEC) 20 MG delayed release capsule, TK 1 C PO QD, Disp: , Rfl: 3    clopidogrel (PLAVIX) 75 MG tablet, TAKE 1 TABLET DAILY, Disp: 90 tablet, Rfl: 1    traZODone

## 2018-11-15 ENCOUNTER — HOSPITAL ENCOUNTER (OUTPATIENT)
Dept: MRI IMAGING | Age: 63
Discharge: HOME | End: 2018-11-15
Payer: MEDICARE

## 2018-11-15 DIAGNOSIS — K59.00 CONSTIPATION, UNSPECIFIED CONSTIPATION TYPE: ICD-10-CM

## 2018-11-15 PROCEDURE — 72195 MRI PELVIS W/O DYE: CPT

## 2018-11-26 ENCOUNTER — CARE COORDINATION (OUTPATIENT)
Dept: CARE COORDINATION | Age: 63
End: 2018-11-26

## 2018-11-26 NOTE — CARE COORDINATION
MG tablet Take 1 tablet by mouth daily 7/26/18  Yes Daxa Trevino MD   omeprazole (PRILOSEC) 20 MG delayed release capsule TK 1 C PO QD 5/7/18  Yes Historical Provider, MD   clopidogrel (PLAVIX) 75 MG tablet TAKE 1 TABLET DAILY 7/16/18  Yes Shyla Morris MD   traZODone (DESYREL) 50 MG tablet TAKE 1 TABLET NIGHTLY 7/16/18  Yes Shyla Morris MD   simvastatin (ZOCOR) 20 MG tablet TAKE 1 TABLET DAILY 7/16/18  Yes Shyla Morris MD   buPROPion [facility] SR) 150 MG extended release tablet TAKE 1 TABLET TWICE A DAY 7/16/18  Yes Shyla Morris MD   lactulose (CHRONULAC) 10 GM/15ML solution Take 15 mLs by mouth 2 times daily as needed (constipation) 4/3/18  Yes Shyla Morris MD   folic acid (FOLVITE) 902 MCG tablet Take 400 mcg by mouth daily   Yes Historical Provider, MD   hydrocortisone (ANUSOL-HC) 25 MG suppository Place 1 suppository rectally every 12 hours 12/28/17  Yes Cristina Gudino MD   loratadine (CLARITIN) 10 MG tablet Take 1 tablet by mouth daily 9/27/17  Yes Cristina Gudino MD   b complex vitamins capsule Take 1 capsule by mouth daily 6/2/16  Yes Valentine Daily MD   diclofenac 2 % SOLN Place onto the skin as needed   Yes Historical Provider, MD   Probiotic Product (PROBIOTIC DAILY PO) Take  by mouth. Yes Historical Provider, MD   FISH OIL 1,000 mg by Does not apply route 2 times daily Indications: Decreased Energy    Yes Historical Provider, MD   aspirin 81 MG tablet Take 81 mg by mouth daily. Yes Historical Provider, MD   Misc. Devices (COMMODE BEDSIDE) MISC Dispense 1 beside commode. 11/14/18   MD Meri Srivastava. Devices (COMMODE BEDSIDE) MISC .  9/11/18   Shyla Morris MD       Future Appointments  Date Time Provider Jaya Chapman   12/6/2018 9:40 AM Daxa Trevino MD [facility] - [address] [facility]   12/10/2018 11:00 AM Ezio Shen, PhD Darek Costello   1/14/2019 10:00 AM Ezio Shen, PhD Darek Costello   1/15/2019

## 2018-11-28 DIAGNOSIS — K58.9 IRRITABLE BOWEL SYNDROME WITHOUT DIARRHEA: ICD-10-CM

## 2018-11-28 RX ORDER — CHLORDIAZEPOXIDE HYDROCHLORIDE AND CLIDINIUM BROMIDE 5; 2.5 MG/1; MG/1
CAPSULE ORAL
Qty: 180 CAPSULE | Refills: 0 | Status: SHIPPED | OUTPATIENT
Start: 2018-11-28 | End: 2019-01-15 | Stop reason: SDUPTHER

## 2018-11-29 ENCOUNTER — HOSPITAL ENCOUNTER (EMERGENCY)
Age: 63
Discharge: HOME | End: 2018-11-29
Attending: FAMILY MEDICINE
Payer: MEDICARE

## 2018-11-29 ENCOUNTER — APPOINTMENT (OUTPATIENT)
Dept: CT IMAGING | Age: 63
End: 2018-11-29
Payer: MEDICARE

## 2018-11-29 VITALS
DIASTOLIC BLOOD PRESSURE: 64 MMHG | SYSTOLIC BLOOD PRESSURE: 130 MMHG | RESPIRATION RATE: 20 BRPM | BODY MASS INDEX: 23.56 KG/M2 | OXYGEN SATURATION: 95 % | HEART RATE: 85 BPM | TEMPERATURE: 98.6 F | HEIGHT: 60 IN | WEIGHT: 120 LBS

## 2018-11-29 DIAGNOSIS — Z86.73 OLD CEREBROVASCULAR ACCIDENT (CVA) WITHOUT LATE EFFECT: ICD-10-CM

## 2018-11-29 DIAGNOSIS — R51.9 NONINTRACTABLE EPISODIC HEADACHE, UNSPECIFIED HEADACHE TYPE: Primary | ICD-10-CM

## 2018-11-29 LAB
ALBUMIN SERPL-MCNC: 4 G/DL (ref 3.5–5.1)
ALP BLD-CCNC: 133 U/L (ref 38–126)
ALT SERPL-CCNC: 22 U/L (ref 11–66)
ANION GAP SERPL CALCULATED.3IONS-SCNC: 15 MEQ/L (ref 8–16)
APTT: 29.3 SECONDS (ref 22–38)
AST SERPL-CCNC: 20 U/L (ref 5–40)
BASOPHILS # BLD: 0.7 %
BASOPHILS ABSOLUTE: 0 THOU/MM3 (ref 0–0.1)
BILIRUB SERPL-MCNC: 0.3 MG/DL (ref 0.3–1.2)
BILIRUBIN DIRECT: < 0.2 MG/DL (ref 0–0.3)
BUN BLDV-MCNC: 28 MG/DL (ref 7–22)
CALCIUM SERPL-MCNC: 9.1 MG/DL (ref 8.5–10.5)
CHLORIDE BLD-SCNC: 106 MEQ/L (ref 98–111)
CO2: 24 MEQ/L (ref 23–33)
CREAT SERPL-MCNC: 1.5 MG/DL (ref 0.4–1.2)
EKG ATRIAL RATE: 74 BPM
EKG P AXIS: 51 DEGREES
EKG P-R INTERVAL: 132 MS
EKG Q-T INTERVAL: 382 MS
EKG QRS DURATION: 84 MS
EKG QTC CALCULATION (BAZETT): 424 MS
EKG R AXIS: 81 DEGREES
EKG T AXIS: 51 DEGREES
EKG VENTRICULAR RATE: 74 BPM
EOSINOPHIL # BLD: 1.5 %
EOSINOPHILS ABSOLUTE: 0.1 THOU/MM3 (ref 0–0.4)
ERYTHROCYTE [DISTWIDTH] IN BLOOD BY AUTOMATED COUNT: 13.6 % (ref 11.5–14.5)
ERYTHROCYTE [DISTWIDTH] IN BLOOD BY AUTOMATED COUNT: 46.3 FL (ref 35–45)
GFR SERPL CREATININE-BSD FRML MDRD: 35 ML/MIN/1.73M2
GLUCOSE BLD-MCNC: 73 MG/DL (ref 70–108)
GLUCOSE BLD-MCNC: 82 MG/DL (ref 70–108)
HCT VFR BLD CALC: 36.6 % (ref 37–47)
HEMOGLOBIN: 11.8 GM/DL (ref 12–16)
IMMATURE GRANS (ABS): 0.02 THOU/MM3 (ref 0–0.07)
IMMATURE GRANULOCYTES: 0.3 %
INR BLD: 0.87 (ref 0.85–1.13)
LYMPHOCYTES # BLD: 25.8 %
LYMPHOCYTES ABSOLUTE: 1.6 THOU/MM3 (ref 1–4.8)
MCH RBC QN AUTO: 30.2 PG (ref 26–33)
MCHC RBC AUTO-ENTMCNC: 32.2 GM/DL (ref 32.2–35.5)
MCV RBC AUTO: 93.6 FL (ref 81–99)
MONOCYTES # BLD: 7.8 %
MONOCYTES ABSOLUTE: 0.5 THOU/MM3 (ref 0.4–1.3)
NUCLEATED RED BLOOD CELLS: 0 /100 WBC
OSMOLALITY CALCULATION: 293.3 MOSMOL/KG (ref 275–300)
PLATELET # BLD: 238 THOU/MM3 (ref 130–400)
PMV BLD AUTO: 10 FL (ref 9.4–12.4)
POTASSIUM SERPL-SCNC: 3.9 MEQ/L (ref 3.5–5.2)
RBC # BLD: 3.91 MILL/MM3 (ref 4.2–5.4)
SEG NEUTROPHILS: 63.9 %
SEGMENTED NEUTROPHILS ABSOLUTE COUNT: 3.9 THOU/MM3 (ref 1.8–7.7)
SODIUM BLD-SCNC: 145 MEQ/L (ref 135–145)
TOTAL PROTEIN: 6.6 G/DL (ref 6.1–8)
WBC # BLD: 6.1 THOU/MM3 (ref 4.8–10.8)

## 2018-11-29 PROCEDURE — 96374 THER/PROPH/DIAG INJ IV PUSH: CPT

## 2018-11-29 PROCEDURE — 6360000002 HC RX W HCPCS: Performed by: FAMILY MEDICINE

## 2018-11-29 PROCEDURE — 36415 COLL VENOUS BLD VENIPUNCTURE: CPT

## 2018-11-29 PROCEDURE — 85610 PROTHROMBIN TIME: CPT

## 2018-11-29 PROCEDURE — 99285 EMERGENCY DEPT VISIT HI MDM: CPT

## 2018-11-29 PROCEDURE — 80053 COMPREHEN METABOLIC PANEL: CPT

## 2018-11-29 PROCEDURE — 82248 BILIRUBIN DIRECT: CPT

## 2018-11-29 PROCEDURE — 82948 REAGENT STRIP/BLOOD GLUCOSE: CPT

## 2018-11-29 PROCEDURE — 85025 COMPLETE CBC W/AUTO DIFF WBC: CPT

## 2018-11-29 PROCEDURE — 85730 THROMBOPLASTIN TIME PARTIAL: CPT

## 2018-11-29 PROCEDURE — 6370000000 HC RX 637 (ALT 250 FOR IP): Performed by: FAMILY MEDICINE

## 2018-11-29 PROCEDURE — 70450 CT HEAD/BRAIN W/O DYE: CPT

## 2018-11-29 PROCEDURE — 93005 ELECTROCARDIOGRAM TRACING: CPT | Performed by: FAMILY MEDICINE

## 2018-11-29 RX ORDER — MORPHINE SULFATE 2 MG/ML
2 INJECTION, SOLUTION INTRAMUSCULAR; INTRAVENOUS ONCE
Status: COMPLETED | OUTPATIENT
Start: 2018-11-29 | End: 2018-11-29

## 2018-11-29 RX ORDER — ACETAMINOPHEN 500 MG
1000 TABLET ORAL ONCE
Status: COMPLETED | OUTPATIENT
Start: 2018-11-29 | End: 2018-11-29

## 2018-11-29 RX ADMIN — MORPHINE SULFATE 2 MG: 2 INJECTION, SOLUTION INTRAMUSCULAR; INTRAVENOUS at 17:46

## 2018-11-29 RX ADMIN — ACETAMINOPHEN 1000 MG: 500 TABLET, FILM COATED ORAL at 20:25

## 2018-11-29 ASSESSMENT — PAIN SCALES - GENERAL
PAINLEVEL_OUTOF10: 2
PAINLEVEL_OUTOF10: 9

## 2018-11-29 ASSESSMENT — PAIN DESCRIPTION - LOCATION: LOCATION: HEAD

## 2018-11-29 ASSESSMENT — PAIN DESCRIPTION - DESCRIPTORS: DESCRIPTORS: OTHER (COMMENT)

## 2018-11-29 ASSESSMENT — PAIN DESCRIPTION - PROGRESSION: CLINICAL_PROGRESSION: RAPIDLY IMPROVING

## 2018-11-29 ASSESSMENT — ENCOUNTER SYMPTOMS
ABDOMINAL PAIN: 0
SHORTNESS OF BREATH: 0
COUGH: 0

## 2018-11-29 ASSESSMENT — PAIN DESCRIPTION - PAIN TYPE: TYPE: ACUTE PAIN

## 2018-11-29 NOTE — ED PROVIDER NOTES
following:        Result Value    BUN 28 (*)     CREATININE 1.5 (*)     All other components within normal limits   HEPATIC FUNCTION PANEL - Abnormal; Notable for the following:     Alkaline Phosphatase 133 (*)     All other components within normal limits   CBC WITH AUTO DIFFERENTIAL - Abnormal; Notable for the following:     RBC 3.91 (*)     Hemoglobin 11.8 (*)     Hematocrit 36.6 (*)     RDW-SD 46.3 (*)     All other components within normal limits   GLOMERULAR FILTRATION RATE, ESTIMATED - Abnormal; Notable for the following:     Est, Glom Filt Rate 35 (*)     All other components within normal limits   PROTIME-INR   APTT   ANION GAP   OSMOLALITY   POCT GLUCOSE       EMERGENCY DEPARTMENT COURSE:   Vitals:    Vitals:    11/29/18 1726 11/29/18 1850   BP: (!) 146/67 130/64   Pulse: 84 85   Resp: 16 20   Temp: 98.6 °F (37 °C)    TempSrc: Oral    SpO2: 100% 95%   Weight: 120 lb (54.4 kg)    Height: 5' (1.524 m)        5:49 PM: The patient was seen and evaluated in a timely fashion. Nursing notes reviewed    CT brain negative for acute process. She has old right hemisphere stroke encephalomalacia    Mild anemia 11.8    Creatinine 1.5 which is better than her previous    Given morphine 2 mg for headache and later Tylenol by mouth    Reassured there is no sign of acute stroke  Or evidence of any bleeding    She's feeling much better and is comfortable going home    Discharge home          CRITICAL CARE:   none     CONSULTS:  none    PROCEDURES:  none     FINAL IMPRESSION      1. Nonintractable episodic headache, unspecified headache type    2.  Old cerebrovascular accident (CVA) without late effect          DISPOSITION/PLAN     Discharge home    PATIENT REFERRED TO:  Nova Guerin MD  [address] [facility]  [phone]    In 1 week        DISCHARGE MEDICATIONS:  Discharge Medication List as of 11/29/2018  8:10 PM          (Please note that portions of this note were completed with a voice

## 2018-11-30 PROCEDURE — 93010 ELECTROCARDIOGRAM REPORT: CPT | Performed by: INTERNAL MEDICINE

## 2018-12-04 ENCOUNTER — HOSPITAL ENCOUNTER (OUTPATIENT)
Age: 63
Discharge: HOME | End: 2018-12-04
Payer: MEDICARE

## 2018-12-04 ENCOUNTER — OFFICE VISIT (OUTPATIENT)
Dept: FAMILY MEDICINE CLINIC | Age: 63
End: 2018-12-04
Payer: MEDICARE

## 2018-12-04 VITALS
SYSTOLIC BLOOD PRESSURE: 130 MMHG | DIASTOLIC BLOOD PRESSURE: 86 MMHG | HEART RATE: 64 BPM | WEIGHT: 122 LBS | HEIGHT: 60 IN | BODY MASS INDEX: 23.95 KG/M2

## 2018-12-04 DIAGNOSIS — F41.9 ANXIETY: Primary | ICD-10-CM

## 2018-12-04 DIAGNOSIS — N18.30 CHRONIC KIDNEY DISEASE, STAGE III (MODERATE) (HCC): ICD-10-CM

## 2018-12-04 DIAGNOSIS — I63.00 CEREBRAL INFARCTION DUE TO THROMBOSIS OF PRECEREBRAL ARTERY (HCC): Chronic | ICD-10-CM

## 2018-12-04 DIAGNOSIS — I10 ESSENTIAL HYPERTENSION: Chronic | ICD-10-CM

## 2018-12-04 DIAGNOSIS — N18.4 CKD (CHRONIC KIDNEY DISEASE), STAGE IV (HCC): ICD-10-CM

## 2018-12-04 LAB
ALBUMIN SERPL-MCNC: 4.2 G/DL (ref 3.5–5.1)
ALP BLD-CCNC: 132 U/L (ref 38–126)
ALT SERPL-CCNC: 18 U/L (ref 11–66)
ANION GAP SERPL CALCULATED.3IONS-SCNC: 14 MEQ/L (ref 8–16)
AST SERPL-CCNC: 19 U/L (ref 5–40)
BACTERIA: ABNORMAL
BILIRUB SERPL-MCNC: 0.3 MG/DL (ref 0.3–1.2)
BILIRUBIN URINE: NEGATIVE
BLOOD, URINE: NEGATIVE
BUN BLDV-MCNC: 25 MG/DL (ref 7–22)
CALCIUM SERPL-MCNC: 9.4 MG/DL (ref 8.5–10.5)
CASTS: ABNORMAL /LPF
CASTS: ABNORMAL /LPF
CHARACTER, URINE: CLEAR
CHLORIDE BLD-SCNC: 103 MEQ/L (ref 98–111)
CO2: 27 MEQ/L (ref 23–33)
COLOR: YELLOW
CREAT SERPL-MCNC: 1.6 MG/DL (ref 0.4–1.2)
CREATININE URINE: 95.3 MG/DL
CRYSTALS: ABNORMAL
EPITHELIAL CELLS, UA: ABNORMAL /HPF
ERYTHROCYTE [DISTWIDTH] IN BLOOD BY AUTOMATED COUNT: 13.8 % (ref 11.5–14.5)
ERYTHROCYTE [DISTWIDTH] IN BLOOD BY AUTOMATED COUNT: 47.8 FL (ref 35–45)
GFR SERPL CREATININE-BSD FRML MDRD: 32 ML/MIN/1.73M2
GLUCOSE BLD-MCNC: 98 MG/DL (ref 70–108)
GLUCOSE, URINE: NEGATIVE MG/DL
HCT VFR BLD CALC: 37.4 % (ref 37–47)
HEMOGLOBIN: 11.6 GM/DL (ref 12–16)
KETONES, URINE: NEGATIVE
LEUKOCYTE ESTERASE, URINE: ABNORMAL
MCH RBC QN AUTO: 29.6 PG (ref 26–33)
MCHC RBC AUTO-ENTMCNC: 31 GM/DL (ref 32.2–35.5)
MCV RBC AUTO: 95.4 FL (ref 81–99)
MISCELLANEOUS LAB TEST RESULT: ABNORMAL
NITRITE, URINE: NEGATIVE
PH UA: 6.5
PHOSPHORUS: 3.2 MG/DL (ref 2.4–4.7)
PLATELET # BLD: 309 THOU/MM3 (ref 130–400)
PMV BLD AUTO: 10.5 FL (ref 9.4–12.4)
POTASSIUM SERPL-SCNC: 4.2 MEQ/L (ref 3.5–5.2)
PROTEIN UA: NEGATIVE MG/DL
PROTEIN, URINE: 14.9 MG/DL
PTH INTACT: 83 PG/ML (ref 15–65)
RBC # BLD: 3.92 MILL/MM3 (ref 4.2–5.4)
RBC URINE: ABNORMAL /HPF
RENAL EPITHELIAL, UA: ABNORMAL
SODIUM BLD-SCNC: 144 MEQ/L (ref 135–145)
SPECIFIC GRAVITY UA: 1.01 (ref 1–1.03)
TOTAL PROTEIN: 6.5 G/DL (ref 6.1–8)
UROBILINOGEN, URINE: 1 EU/DL
VITAMIN D 25-HYDROXY: 19 NG/ML (ref 30–100)
WBC # BLD: 5.6 THOU/MM3 (ref 4.8–10.8)
WBC UA: ABNORMAL /HPF
YEAST: ABNORMAL

## 2018-12-04 PROCEDURE — 84100 ASSAY OF PHOSPHORUS: CPT

## 2018-12-04 PROCEDURE — G8420 CALC BMI NORM PARAMETERS: HCPCS | Performed by: FAMILY MEDICINE

## 2018-12-04 PROCEDURE — 4040F PNEUMOC VAC/ADMIN/RCVD: CPT | Performed by: FAMILY MEDICINE

## 2018-12-04 PROCEDURE — G8427 DOCREV CUR MEDS BY ELIG CLIN: HCPCS | Performed by: FAMILY MEDICINE

## 2018-12-04 PROCEDURE — 1036F TOBACCO NON-USER: CPT | Performed by: FAMILY MEDICINE

## 2018-12-04 PROCEDURE — 80053 COMPREHEN METABOLIC PANEL: CPT

## 2018-12-04 PROCEDURE — 1101F PT FALLS ASSESS-DOCD LE1/YR: CPT | Performed by: FAMILY MEDICINE

## 2018-12-04 PROCEDURE — 84156 ASSAY OF PROTEIN URINE: CPT

## 2018-12-04 PROCEDURE — 85027 COMPLETE CBC AUTOMATED: CPT

## 2018-12-04 PROCEDURE — 3017F COLORECTAL CA SCREEN DOC REV: CPT | Performed by: FAMILY MEDICINE

## 2018-12-04 PROCEDURE — 1090F PRES/ABSN URINE INCON ASSESS: CPT | Performed by: FAMILY MEDICINE

## 2018-12-04 PROCEDURE — G8482 FLU IMMUNIZE ORDER/ADMIN: HCPCS | Performed by: FAMILY MEDICINE

## 2018-12-04 PROCEDURE — 82306 VITAMIN D 25 HYDROXY: CPT

## 2018-12-04 PROCEDURE — 81001 URINALYSIS AUTO W/SCOPE: CPT

## 2018-12-04 PROCEDURE — 1123F ACP DISCUSS/DSCN MKR DOCD: CPT | Performed by: FAMILY MEDICINE

## 2018-12-04 PROCEDURE — G8400 PT W/DXA NO RESULTS DOC: HCPCS | Performed by: FAMILY MEDICINE

## 2018-12-04 PROCEDURE — 36415 COLL VENOUS BLD VENIPUNCTURE: CPT

## 2018-12-04 PROCEDURE — 83970 ASSAY OF PARATHORMONE: CPT

## 2018-12-04 PROCEDURE — 82570 ASSAY OF URINE CREATININE: CPT

## 2018-12-04 PROCEDURE — G8598 ASA/ANTIPLAT THER USED: HCPCS | Performed by: FAMILY MEDICINE

## 2018-12-04 PROCEDURE — 99213 OFFICE O/P EST LOW 20 MIN: CPT | Performed by: FAMILY MEDICINE

## 2018-12-04 RX ORDER — HYDROXYZINE HYDROCHLORIDE 25 MG/1
25 TABLET, FILM COATED ORAL EVERY 8 HOURS PRN
Qty: 30 TABLET | Refills: 0 | Status: SHIPPED | OUTPATIENT
Start: 2018-12-04 | End: 2018-12-14

## 2018-12-04 ASSESSMENT — ENCOUNTER SYMPTOMS: COLOR CHANGE: 0

## 2018-12-04 NOTE — PROGRESS NOTES
BEDSIDE) MISC, ., Disp: 1 each, Rfl: 0    linaclotide (LINZESS) 145 MCG capsule, Take 1 capsule by mouth daily as needed (constipation), Disp: 30 capsule, Rfl: 2    amLODIPine (NORVASC) 10 MG tablet, Take 1 tablet by mouth daily, Disp: 90 tablet, Rfl: 1    omeprazole (PRILOSEC) 20 MG delayed release capsule, TK 1 C PO QD, Disp: , Rfl: 3    clopidogrel (PLAVIX) 75 MG tablet, TAKE 1 TABLET DAILY, Disp: 90 tablet, Rfl: 1    traZODone (DESYREL) 50 MG tablet, TAKE 1 TABLET NIGHTLY, Disp: 90 tablet, Rfl: 1    simvastatin (ZOCOR) 20 MG tablet, TAKE 1 TABLET DAILY, Disp: 90 tablet, Rfl: 1    buPROPion (WELLBUTRIN SR) 150 MG extended release tablet, TAKE 1 TABLET TWICE A DAY, Disp: 180 tablet, Rfl: 1    lactulose (CHRONULAC) 10 GM/15ML solution, Take 15 mLs by mouth 2 times daily as needed (constipation), Disp: 946 mL, Rfl: 1    folic acid (FOLVITE) 879 MCG tablet, Take 400 mcg by mouth daily, Disp: , Rfl:     hydrocortisone (ANUSOL-HC) 25 MG suppository, Place 1 suppository rectally every 12 hours, Disp: 24 suppository, Rfl: 0    loratadine (CLARITIN) 10 MG tablet, Take 1 tablet by mouth daily, Disp: 30 tablet, Rfl: 2    b complex vitamins capsule, Take 1 capsule by mouth daily, Disp: 100 capsule, Rfl: 3    diclofenac 2 % SOLN, Place onto the skin as needed, Disp: , Rfl:     Probiotic Product (PROBIOTIC DAILY PO), Take  by mouth., Disp: , Rfl:     FISH OIL, 1,000 mg by Does not apply route 2 times daily Indications: Decreased Energy , Disp: , Rfl:     aspirin 81 MG tablet, Take 81 mg by mouth daily. , Disp: , Rfl:     Allergies   Allergen Reactions    Actos [Pioglitazone] Nausea And Vomiting    Augmentin [Amoxicillin-Pot Clavulanate] Diarrhea    Ciprofloxacin      unsure    Sulfa Antibiotics Rash       Subjective:      Review of Systems   Constitutional: Negative for chills and fever. Cardiovascular: Negative for chest pain and palpitations. Skin: Negative for color change and rash.    Neurological: inability to  and ambulation device, ambulating only short distances by pushing a walker, and the need to sit for a short time before resuming ambulation.  These tasks cannot be completed with a lesser ambulation device such as a cane, crutch, or standard walker.  The need for this equipment was discussed with the patient and she understands and is in agreement. Return in about 3 months (around 3/4/2019) for anxiety.     Electronically signed by Marilee Dacosta MD on 12/4/2018 at 12:05 PM

## 2018-12-06 ENCOUNTER — OFFICE VISIT (OUTPATIENT)
Dept: NEPHROLOGY | Age: 63
End: 2018-12-06
Payer: MEDICARE

## 2018-12-06 VITALS
DIASTOLIC BLOOD PRESSURE: 73 MMHG | WEIGHT: 121.6 LBS | SYSTOLIC BLOOD PRESSURE: 125 MMHG | BODY MASS INDEX: 23.75 KG/M2 | OXYGEN SATURATION: 99 % | HEART RATE: 80 BPM

## 2018-12-06 DIAGNOSIS — I10 ESSENTIAL HYPERTENSION: Chronic | ICD-10-CM

## 2018-12-06 DIAGNOSIS — N18.30 CKD (CHRONIC KIDNEY DISEASE) STAGE 3, GFR 30-59 ML/MIN (HCC): Primary | ICD-10-CM

## 2018-12-06 PROCEDURE — 1123F ACP DISCUSS/DSCN MKR DOCD: CPT | Performed by: INTERNAL MEDICINE

## 2018-12-06 PROCEDURE — G8428 CUR MEDS NOT DOCUMENT: HCPCS | Performed by: INTERNAL MEDICINE

## 2018-12-06 PROCEDURE — 3017F COLORECTAL CA SCREEN DOC REV: CPT | Performed by: INTERNAL MEDICINE

## 2018-12-06 PROCEDURE — 99213 OFFICE O/P EST LOW 20 MIN: CPT | Performed by: INTERNAL MEDICINE

## 2018-12-06 PROCEDURE — 4040F PNEUMOC VAC/ADMIN/RCVD: CPT | Performed by: INTERNAL MEDICINE

## 2018-12-06 PROCEDURE — G8598 ASA/ANTIPLAT THER USED: HCPCS | Performed by: INTERNAL MEDICINE

## 2018-12-06 PROCEDURE — 1101F PT FALLS ASSESS-DOCD LE1/YR: CPT | Performed by: INTERNAL MEDICINE

## 2018-12-06 PROCEDURE — G8482 FLU IMMUNIZE ORDER/ADMIN: HCPCS | Performed by: INTERNAL MEDICINE

## 2018-12-06 PROCEDURE — 1036F TOBACCO NON-USER: CPT | Performed by: INTERNAL MEDICINE

## 2018-12-06 PROCEDURE — 1090F PRES/ABSN URINE INCON ASSESS: CPT | Performed by: INTERNAL MEDICINE

## 2018-12-06 PROCEDURE — G8420 CALC BMI NORM PARAMETERS: HCPCS | Performed by: INTERNAL MEDICINE

## 2018-12-06 PROCEDURE — G8400 PT W/DXA NO RESULTS DOC: HCPCS | Performed by: INTERNAL MEDICINE

## 2018-12-06 RX ORDER — ERGOCALCIFEROL 1.25 MG/1
50000 CAPSULE ORAL WEEKLY
Qty: 12 CAPSULE | Refills: 1 | Status: SHIPPED | OUTPATIENT
Start: 2018-12-06 | End: 2019-01-15 | Stop reason: SDUPTHER

## 2018-12-06 RX ORDER — AMLODIPINE BESYLATE 10 MG/1
10 TABLET ORAL DAILY
Qty: 90 TABLET | Refills: 1 | Status: SHIPPED | OUTPATIENT
Start: 2018-12-06 | End: 2019-01-28 | Stop reason: SDUPTHER

## 2018-12-06 NOTE — PROGRESS NOTES
[facility]        Outpatient Follow-Up note         12/6/2018 9:55 AM    Patient Name:   Jerrod Burciaga  YOB: 1955  Primary Care Physician:  Jose Lora MD      Chief Complaint / Reason for follow-up : Follow Up of CKD     Interval History :  Patient seen and examined by me. Feels well. No CP or SOB . No other issues. No hospitalizations and no med changes     Past History :  Past Medical History:   Diagnosis Date    Allergic rhinitis     Anxiety     CKD stage 4 due to type 2 diabetes mellitus ([address]     CVA (cerebral infarction)     Depression     Fibromyalgia     Headache(784.0)     Hyperlipidemia     Hypertension     Irritable bowel syndrome     Type II or unspecified type diabetes mellitus without mention of complication, not stated as uncontrolled     Unspecified cerebral artery occlusion with cerebral infarction     Unspecified sleep apnea      Past Surgical History:   Procedure Laterality Date    CARPAL TUNNEL RELEASE Right     [facility]    ENDOSCOPY, COLON, DIAGNOSTIC  unsure    EYE SURGERY      lasik    HEMORRHOID SURGERY  unsure    HYSTERECTOMY      total    NECK SURGERY  18  years ago    fusion    SINUS SURGERY  10 years ago    TUBAL LIGATION          Medications :     Outpatient Prescriptions Marked as Taking for the 12/6/18 encounter (Office Visit) with Blanka Grimaldo MD   Medication Sig Dispense Refill    Misc. Devices (WALKER) MISC 1 each by Does not apply route continuous 1 each 0    hydrOXYzine (ATARAX) 25 MG tablet Take 1 tablet by mouth every 8 hours as needed for Itching 30 tablet 0    chlordiazePOXIDE-clidinium (LIBRAX) 5-2.5 MG per capsule TAKE 1 CAPSULE TWICE A DAY AS NEEDED FOR PAIN 180 capsule 0    Misc. Devices (COMMODE BEDSIDE) MISC Dispense 1 beside commode. 1 each 0    ranitidine (ZANTAC) 150 MG tablet Take 1 tablet by mouth 2 times daily 60 tablet 3    Misc.  Devices (COMMODE BEDSIDE)

## 2018-12-10 ENCOUNTER — OFFICE VISIT (OUTPATIENT)
Dept: PSYCHOLOGY | Age: 63
End: 2018-12-10
Payer: MEDICARE

## 2018-12-10 DIAGNOSIS — F41.9 ANXIETY: Primary | ICD-10-CM

## 2018-12-10 PROCEDURE — 90834 PSYTX W PT 45 MINUTES: CPT | Performed by: PSYCHOLOGIST

## 2018-12-22 DIAGNOSIS — F41.9 ANXIETY DISORDER, UNSPECIFIED TYPE: ICD-10-CM

## 2018-12-22 DIAGNOSIS — I63.00 CEREBRAL INFARCTION DUE TO THROMBOSIS OF PRECEREBRAL ARTERY (HCC): Chronic | ICD-10-CM

## 2018-12-24 RX ORDER — BUPROPION HYDROCHLORIDE 150 MG/1
TABLET, EXTENDED RELEASE ORAL
Qty: 180 TABLET | Refills: 1 | Status: SHIPPED | OUTPATIENT
Start: 2018-12-24 | End: 2019-01-15 | Stop reason: SDUPTHER

## 2018-12-24 RX ORDER — TRAZODONE HYDROCHLORIDE 50 MG/1
TABLET ORAL
Qty: 90 TABLET | Refills: 1 | Status: SHIPPED | OUTPATIENT
Start: 2018-12-24 | End: 2019-01-15 | Stop reason: SDUPTHER

## 2018-12-24 RX ORDER — CLOPIDOGREL BISULFATE 75 MG/1
TABLET ORAL
Qty: 90 TABLET | Refills: 1 | Status: SHIPPED | OUTPATIENT
Start: 2018-12-24 | End: 2019-01-15 | Stop reason: SDUPTHER

## 2018-12-27 ENCOUNTER — CARE COORDINATION (OUTPATIENT)
Dept: CARE COORDINATION | Age: 63
End: 2018-12-27

## 2019-01-14 ENCOUNTER — OFFICE VISIT (OUTPATIENT)
Dept: PSYCHOLOGY | Age: 64
End: 2019-01-14
Payer: MEDICARE

## 2019-01-14 DIAGNOSIS — F41.1 GENERALIZED ANXIETY DISORDER: Primary | Chronic | ICD-10-CM

## 2019-01-14 DIAGNOSIS — K58.1 IRRITABLE BOWEL SYNDROME WITH CONSTIPATION: ICD-10-CM

## 2019-01-14 PROCEDURE — 90834 PSYTX W PT 45 MINUTES: CPT | Performed by: PSYCHOLOGIST

## 2019-01-14 ASSESSMENT — ENCOUNTER SYMPTOMS
SHORTNESS OF BREATH: 0
CHEST TIGHTNESS: 0
EYE DISCHARGE: 0
VOMITING: 0
BLOOD IN STOOL: 0
CONSTIPATION: 1
EYE REDNESS: 0
COLOR CHANGE: 0
NAUSEA: 0
DIARRHEA: 1

## 2019-01-15 ENCOUNTER — OFFICE VISIT (OUTPATIENT)
Dept: FAMILY MEDICINE CLINIC | Age: 64
End: 2019-01-15
Payer: MEDICARE

## 2019-01-15 ENCOUNTER — TELEPHONE (OUTPATIENT)
Dept: FAMILY MEDICINE CLINIC | Age: 64
End: 2019-01-15

## 2019-01-15 VITALS
HEART RATE: 64 BPM | WEIGHT: 128 LBS | BODY MASS INDEX: 25.13 KG/M2 | SYSTOLIC BLOOD PRESSURE: 126 MMHG | HEIGHT: 60 IN | DIASTOLIC BLOOD PRESSURE: 84 MMHG

## 2019-01-15 DIAGNOSIS — K21.9 GASTROESOPHAGEAL REFLUX DISEASE WITHOUT ESOPHAGITIS: ICD-10-CM

## 2019-01-15 DIAGNOSIS — Z23 NEED FOR VACCINATION FOR STREP PNEUMONIAE: ICD-10-CM

## 2019-01-15 DIAGNOSIS — Z12.31 ENCOUNTER FOR SCREENING MAMMOGRAM FOR BREAST CANCER: ICD-10-CM

## 2019-01-15 DIAGNOSIS — E78.00 PURE HYPERCHOLESTEROLEMIA: ICD-10-CM

## 2019-01-15 DIAGNOSIS — F41.1 GENERALIZED ANXIETY DISORDER: ICD-10-CM

## 2019-01-15 DIAGNOSIS — E55.9 VITAMIN D DEFICIENCY: ICD-10-CM

## 2019-01-15 DIAGNOSIS — I10 ESSENTIAL HYPERTENSION: ICD-10-CM

## 2019-01-15 DIAGNOSIS — K58.9 IRRITABLE BOWEL SYNDROME WITHOUT DIARRHEA: ICD-10-CM

## 2019-01-15 DIAGNOSIS — F41.9 ANXIETY DISORDER, UNSPECIFIED TYPE: ICD-10-CM

## 2019-01-15 DIAGNOSIS — E11.9 WELL CONTROLLED TYPE 2 DIABETES MELLITUS (HCC): Primary | ICD-10-CM

## 2019-01-15 DIAGNOSIS — I63.00 CEREBRAL INFARCTION DUE TO THROMBOSIS OF PRECEREBRAL ARTERY (HCC): Chronic | ICD-10-CM

## 2019-01-15 LAB
ALBUMIN SERPL-MCNC: 4.6 G/DL (ref 3.5–5.1)
ALP BLD-CCNC: 129 U/L (ref 38–126)
ALT SERPL-CCNC: 15 U/L (ref 11–66)
ANION GAP SERPL CALCULATED.3IONS-SCNC: 13 MEQ/L (ref 8–16)
AST SERPL-CCNC: 16 U/L (ref 5–40)
BILIRUB SERPL-MCNC: 0.4 MG/DL (ref 0.3–1.2)
BUN BLDV-MCNC: 27 MG/DL (ref 7–22)
CALCIUM SERPL-MCNC: 9.4 MG/DL (ref 8.5–10.5)
CHLORIDE BLD-SCNC: 104 MEQ/L (ref 98–111)
CHOLESTEROL, TOTAL: 187 MG/DL (ref 100–199)
CO2: 27 MEQ/L (ref 23–33)
CREAT SERPL-MCNC: 2.3 MG/DL (ref 0.4–1.2)
CREATININE URINE POCT: NORMAL
GFR SERPL CREATININE-BSD FRML MDRD: 21 ML/MIN/1.73M2
GLUCOSE BLD-MCNC: 97 MG/DL (ref 70–108)
HBA1C MFR BLD: 5.6 %
HDLC SERPL-MCNC: 112 MG/DL
LDL CHOLESTEROL CALCULATED: 63 MG/DL
MICROALBUMIN/CREAT 24H UR: NORMAL MG/G{CREAT}
MICROALBUMIN/CREAT UR-RTO: NORMAL
POTASSIUM SERPL-SCNC: 4.7 MEQ/L (ref 3.5–5.2)
SODIUM BLD-SCNC: 144 MEQ/L (ref 135–145)
TOTAL PROTEIN: 6.7 G/DL (ref 6.1–8)
TRIGL SERPL-MCNC: 62 MG/DL (ref 0–199)

## 2019-01-15 PROCEDURE — 3046F HEMOGLOBIN A1C LEVEL >9.0%: CPT | Performed by: FAMILY MEDICINE

## 2019-01-15 PROCEDURE — 1036F TOBACCO NON-USER: CPT | Performed by: FAMILY MEDICINE

## 2019-01-15 PROCEDURE — 83036 HEMOGLOBIN GLYCOSYLATED A1C: CPT | Performed by: FAMILY MEDICINE

## 2019-01-15 PROCEDURE — G8419 CALC BMI OUT NRM PARAM NOF/U: HCPCS | Performed by: FAMILY MEDICINE

## 2019-01-15 PROCEDURE — G8598 ASA/ANTIPLAT THER USED: HCPCS | Performed by: FAMILY MEDICINE

## 2019-01-15 PROCEDURE — 2022F DILAT RTA XM EVC RTNOPTHY: CPT | Performed by: FAMILY MEDICINE

## 2019-01-15 PROCEDURE — 99214 OFFICE O/P EST MOD 30 MIN: CPT | Performed by: FAMILY MEDICINE

## 2019-01-15 PROCEDURE — 4040F PNEUMOC VAC/ADMIN/RCVD: CPT | Performed by: FAMILY MEDICINE

## 2019-01-15 PROCEDURE — 36415 COLL VENOUS BLD VENIPUNCTURE: CPT | Performed by: FAMILY MEDICINE

## 2019-01-15 PROCEDURE — G0009 ADMIN PNEUMOCOCCAL VACCINE: HCPCS | Performed by: FAMILY MEDICINE

## 2019-01-15 PROCEDURE — G8482 FLU IMMUNIZE ORDER/ADMIN: HCPCS | Performed by: FAMILY MEDICINE

## 2019-01-15 PROCEDURE — 1101F PT FALLS ASSESS-DOCD LE1/YR: CPT | Performed by: FAMILY MEDICINE

## 2019-01-15 PROCEDURE — 90670 PCV13 VACCINE IM: CPT | Performed by: FAMILY MEDICINE

## 2019-01-15 PROCEDURE — 1090F PRES/ABSN URINE INCON ASSESS: CPT | Performed by: FAMILY MEDICINE

## 2019-01-15 PROCEDURE — 82044 UR ALBUMIN SEMIQUANTITATIVE: CPT | Performed by: FAMILY MEDICINE

## 2019-01-15 PROCEDURE — 3017F COLORECTAL CA SCREEN DOC REV: CPT | Performed by: FAMILY MEDICINE

## 2019-01-15 PROCEDURE — G8427 DOCREV CUR MEDS BY ELIG CLIN: HCPCS | Performed by: FAMILY MEDICINE

## 2019-01-15 PROCEDURE — 1123F ACP DISCUSS/DSCN MKR DOCD: CPT | Performed by: FAMILY MEDICINE

## 2019-01-15 PROCEDURE — G8400 PT W/DXA NO RESULTS DOC: HCPCS | Performed by: FAMILY MEDICINE

## 2019-01-15 RX ORDER — SIMVASTATIN 20 MG
TABLET ORAL
Qty: 90 TABLET | Refills: 1 | Status: SHIPPED | OUTPATIENT
Start: 2019-01-15 | End: 2019-06-06 | Stop reason: SDUPTHER

## 2019-01-15 RX ORDER — AMLODIPINE BESYLATE 10 MG/1
10 TABLET ORAL DAILY
Qty: 90 TABLET | Refills: 1 | Status: CANCELLED | OUTPATIENT
Start: 2019-01-15

## 2019-01-15 RX ORDER — CHLORDIAZEPOXIDE HYDROCHLORIDE AND CLIDINIUM BROMIDE 5; 2.5 MG/1; MG/1
CAPSULE ORAL
Qty: 180 CAPSULE | Refills: 0 | Status: SHIPPED | OUTPATIENT
Start: 2019-01-15 | End: 2019-06-06 | Stop reason: SDUPTHER

## 2019-01-15 RX ORDER — ERGOCALCIFEROL 1.25 MG/1
50000 CAPSULE ORAL WEEKLY
Qty: 12 CAPSULE | Refills: 1 | Status: SHIPPED | OUTPATIENT
Start: 2019-01-15 | End: 2019-06-06 | Stop reason: SDUPTHER

## 2019-01-15 RX ORDER — CLOPIDOGREL BISULFATE 75 MG/1
TABLET ORAL
Qty: 90 TABLET | Refills: 1 | Status: SHIPPED | OUTPATIENT
Start: 2019-01-15 | End: 2019-06-06 | Stop reason: SDUPTHER

## 2019-01-15 RX ORDER — RANITIDINE 150 MG/1
150 TABLET ORAL 2 TIMES DAILY
Qty: 180 TABLET | Refills: 1 | Status: SHIPPED | OUTPATIENT
Start: 2019-01-15 | End: 2019-06-06 | Stop reason: SDUPTHER

## 2019-01-15 RX ORDER — BUPROPION HYDROCHLORIDE 150 MG/1
TABLET, EXTENDED RELEASE ORAL
Qty: 180 TABLET | Refills: 1 | Status: SHIPPED | OUTPATIENT
Start: 2019-01-15 | End: 2019-06-06 | Stop reason: SDUPTHER

## 2019-01-15 RX ORDER — TRAZODONE HYDROCHLORIDE 50 MG/1
TABLET ORAL
Qty: 90 TABLET | Refills: 1 | Status: SHIPPED | OUTPATIENT
Start: 2019-01-15 | End: 2019-05-15 | Stop reason: SDUPTHER

## 2019-01-15 RX ORDER — AMLODIPINE BESYLATE 10 MG/1
10 TABLET ORAL DAILY
Qty: 90 TABLET | Refills: 1 | Status: SHIPPED | OUTPATIENT
Start: 2019-01-15 | End: 2019-03-12

## 2019-01-15 RX ORDER — OMEPRAZOLE 20 MG/1
CAPSULE, DELAYED RELEASE ORAL
Qty: 90 CAPSULE | Refills: 1 | Status: SHIPPED | OUTPATIENT
Start: 2019-01-15 | End: 2020-02-05

## 2019-01-16 ENCOUNTER — TELEPHONE (OUTPATIENT)
Dept: NEPHROLOGY | Age: 64
End: 2019-01-16

## 2019-01-17 ENCOUNTER — HOSPITAL ENCOUNTER (OUTPATIENT)
Dept: WOMENS IMAGING | Age: 64
Discharge: HOME | End: 2019-01-17
Payer: MEDICARE

## 2019-01-17 DIAGNOSIS — N18.4 CHRONIC KIDNEY DISEASE, STAGE IV (SEVERE) (HCC): Primary | ICD-10-CM

## 2019-01-17 DIAGNOSIS — Z12.31 ENCOUNTER FOR SCREENING MAMMOGRAM FOR BREAST CANCER: ICD-10-CM

## 2019-01-17 DIAGNOSIS — Z78.0 ASYMPTOMATIC MENOPAUSE: ICD-10-CM

## 2019-01-17 PROCEDURE — 77063 BREAST TOMOSYNTHESIS BI: CPT

## 2019-01-17 PROCEDURE — 77080 DXA BONE DENSITY AXIAL: CPT

## 2019-01-22 ENCOUNTER — NURSE ONLY (OUTPATIENT)
Dept: FAMILY MEDICINE CLINIC | Age: 64
End: 2019-01-22
Payer: MEDICARE

## 2019-01-22 DIAGNOSIS — I10 ESSENTIAL HYPERTENSION: Chronic | ICD-10-CM

## 2019-01-22 DIAGNOSIS — N18.4 CHRONIC KIDNEY DISEASE, STAGE IV (SEVERE) (HCC): ICD-10-CM

## 2019-01-22 DIAGNOSIS — N18.30 CKD (CHRONIC KIDNEY DISEASE) STAGE 3, GFR 30-59 ML/MIN (HCC): ICD-10-CM

## 2019-01-22 LAB
ALBUMIN SERPL-MCNC: 4.3 G/DL (ref 3.5–5.1)
ALP BLD-CCNC: 134 U/L (ref 38–126)
ALT SERPL-CCNC: 15 U/L (ref 11–66)
ANION GAP SERPL CALCULATED.3IONS-SCNC: 14 MEQ/L (ref 8–16)
AST SERPL-CCNC: 20 U/L (ref 5–40)
BILIRUB SERPL-MCNC: 0.3 MG/DL (ref 0.3–1.2)
BUN BLDV-MCNC: 28 MG/DL (ref 7–22)
CALCIUM SERPL-MCNC: 9.5 MG/DL (ref 8.5–10.5)
CHLORIDE BLD-SCNC: 109 MEQ/L (ref 98–111)
CO2: 26 MEQ/L (ref 23–33)
CREAT SERPL-MCNC: 2 MG/DL (ref 0.4–1.2)
GFR SERPL CREATININE-BSD FRML MDRD: 25 ML/MIN/1.73M2
GLUCOSE BLD-MCNC: 100 MG/DL (ref 70–108)
POTASSIUM SERPL-SCNC: 4.5 MEQ/L (ref 3.5–5.2)
PTH INTACT: 79 PG/ML (ref 15–65)
SODIUM BLD-SCNC: 149 MEQ/L (ref 135–145)
TOTAL PROTEIN: 6.7 G/DL (ref 6.1–8)
VITAMIN D 25-HYDROXY: 31 NG/ML (ref 30–100)

## 2019-01-22 PROCEDURE — 36415 COLL VENOUS BLD VENIPUNCTURE: CPT | Performed by: FAMILY MEDICINE

## 2019-01-24 ENCOUNTER — TELEPHONE (OUTPATIENT)
Dept: NEPHROLOGY | Age: 64
End: 2019-01-24

## 2019-01-24 DIAGNOSIS — N18.30 CKD (CHRONIC KIDNEY DISEASE) STAGE 3, GFR 30-59 ML/MIN (HCC): Primary | ICD-10-CM

## 2019-01-25 ENCOUNTER — TELEPHONE (OUTPATIENT)
Dept: FAMILY MEDICINE CLINIC | Age: 64
End: 2019-01-25

## 2019-01-28 RX ORDER — AMLODIPINE BESYLATE 10 MG/1
10 TABLET ORAL DAILY
Qty: 90 TABLET | Refills: 1 | Status: SHIPPED | OUTPATIENT
Start: 2019-01-28 | End: 2019-06-06 | Stop reason: SDUPTHER

## 2019-01-30 ENCOUNTER — CARE COORDINATION (OUTPATIENT)
Dept: CARE COORDINATION | Age: 64
End: 2019-01-30

## 2019-02-07 ENCOUNTER — HOSPITAL ENCOUNTER (OUTPATIENT)
Age: 64
Discharge: HOME | End: 2019-02-07
Payer: MEDICARE

## 2019-02-07 ENCOUNTER — HOSPITAL ENCOUNTER (OUTPATIENT)
Dept: ULTRASOUND IMAGING | Age: 64
Discharge: HOME | End: 2019-02-07
Payer: MEDICARE

## 2019-02-07 DIAGNOSIS — N18.30 CKD (CHRONIC KIDNEY DISEASE) STAGE 3, GFR 30-59 ML/MIN (HCC): ICD-10-CM

## 2019-02-07 LAB
ANION GAP SERPL CALCULATED.3IONS-SCNC: 15 MEQ/L (ref 8–16)
BUN BLDV-MCNC: 22 MG/DL (ref 7–22)
CALCIUM SERPL-MCNC: 9.4 MG/DL (ref 8.5–10.5)
CHLORIDE BLD-SCNC: 102 MEQ/L (ref 98–111)
CO2: 25 MEQ/L (ref 23–33)
CREAT SERPL-MCNC: 1.8 MG/DL (ref 0.4–1.2)
GFR SERPL CREATININE-BSD FRML MDRD: 28 ML/MIN/1.73M2
GLUCOSE BLD-MCNC: 90 MG/DL (ref 70–108)
POTASSIUM SERPL-SCNC: 4.5 MEQ/L (ref 3.5–5.2)
SODIUM BLD-SCNC: 142 MEQ/L (ref 135–145)

## 2019-02-07 PROCEDURE — 80048 BASIC METABOLIC PNL TOTAL CA: CPT

## 2019-02-07 PROCEDURE — 76770 US EXAM ABDO BACK WALL COMP: CPT

## 2019-02-07 PROCEDURE — 36415 COLL VENOUS BLD VENIPUNCTURE: CPT

## 2019-02-08 ENCOUNTER — TELEPHONE (OUTPATIENT)
Dept: NEPHROLOGY | Age: 64
End: 2019-02-08

## 2019-02-11 ENCOUNTER — OFFICE VISIT (OUTPATIENT)
Dept: PSYCHOLOGY | Age: 64
End: 2019-02-11
Payer: MEDICARE

## 2019-02-11 DIAGNOSIS — K58.1 IRRITABLE BOWEL SYNDROME WITH CONSTIPATION: ICD-10-CM

## 2019-02-11 DIAGNOSIS — F41.1 GENERALIZED ANXIETY DISORDER: Primary | Chronic | ICD-10-CM

## 2019-02-11 PROCEDURE — 90834 PSYTX W PT 45 MINUTES: CPT | Performed by: PSYCHOLOGIST

## 2019-02-12 ENCOUNTER — PROCEDURE VISIT (OUTPATIENT)
Dept: FAMILY MEDICINE CLINIC | Age: 64
End: 2019-02-12
Payer: MEDICARE

## 2019-02-12 VITALS
DIASTOLIC BLOOD PRESSURE: 80 MMHG | BODY MASS INDEX: 25.13 KG/M2 | HEIGHT: 60 IN | SYSTOLIC BLOOD PRESSURE: 132 MMHG | WEIGHT: 128 LBS

## 2019-02-12 DIAGNOSIS — L82.0 SEBORRHEIC KERATOSES, INFLAMED: Primary | ICD-10-CM

## 2019-02-12 PROCEDURE — G8399 PT W/DXA RESULTS DOCUMENT: HCPCS | Performed by: FAMILY MEDICINE

## 2019-02-12 PROCEDURE — 1090F PRES/ABSN URINE INCON ASSESS: CPT | Performed by: FAMILY MEDICINE

## 2019-02-12 PROCEDURE — G8427 DOCREV CUR MEDS BY ELIG CLIN: HCPCS | Performed by: FAMILY MEDICINE

## 2019-02-12 PROCEDURE — G8419 CALC BMI OUT NRM PARAM NOF/U: HCPCS | Performed by: FAMILY MEDICINE

## 2019-02-12 PROCEDURE — G8482 FLU IMMUNIZE ORDER/ADMIN: HCPCS | Performed by: FAMILY MEDICINE

## 2019-02-12 PROCEDURE — 3017F COLORECTAL CA SCREEN DOC REV: CPT | Performed by: FAMILY MEDICINE

## 2019-02-12 PROCEDURE — 4040F PNEUMOC VAC/ADMIN/RCVD: CPT | Performed by: FAMILY MEDICINE

## 2019-02-12 PROCEDURE — 1101F PT FALLS ASSESS-DOCD LE1/YR: CPT | Performed by: FAMILY MEDICINE

## 2019-02-12 PROCEDURE — 99213 OFFICE O/P EST LOW 20 MIN: CPT | Performed by: FAMILY MEDICINE

## 2019-02-12 PROCEDURE — G8598 ASA/ANTIPLAT THER USED: HCPCS | Performed by: FAMILY MEDICINE

## 2019-02-12 PROCEDURE — 1036F TOBACCO NON-USER: CPT | Performed by: FAMILY MEDICINE

## 2019-02-12 PROCEDURE — 1123F ACP DISCUSS/DSCN MKR DOCD: CPT | Performed by: FAMILY MEDICINE

## 2019-02-12 ASSESSMENT — PATIENT HEALTH QUESTIONNAIRE - PHQ9
1. LITTLE INTEREST OR PLEASURE IN DOING THINGS: 0
2. FEELING DOWN, DEPRESSED OR HOPELESS: 0
SUM OF ALL RESPONSES TO PHQ9 QUESTIONS 1 & 2: 0
SUM OF ALL RESPONSES TO PHQ QUESTIONS 1-9: 0
SUM OF ALL RESPONSES TO PHQ QUESTIONS 1-9: 0

## 2019-02-12 ASSESSMENT — ENCOUNTER SYMPTOMS
COLOR CHANGE: 0
SHORTNESS OF BREATH: 0
CHEST TIGHTNESS: 0

## 2019-02-20 ENCOUNTER — OFFICE VISIT (OUTPATIENT)
Dept: RHEUMATOLOGY | Age: 64
End: 2019-02-20
Payer: MEDICARE

## 2019-02-20 VITALS
BODY MASS INDEX: 25.11 KG/M2 | WEIGHT: 133 LBS | SYSTOLIC BLOOD PRESSURE: 132 MMHG | OXYGEN SATURATION: 100 % | DIASTOLIC BLOOD PRESSURE: 76 MMHG | HEIGHT: 61 IN | HEART RATE: 70 BPM

## 2019-02-20 DIAGNOSIS — M81.0 AGE-RELATED OSTEOPOROSIS WITHOUT CURRENT PATHOLOGICAL FRACTURE: Primary | ICD-10-CM

## 2019-02-20 PROCEDURE — G8399 PT W/DXA RESULTS DOCUMENT: HCPCS | Performed by: NURSE PRACTITIONER

## 2019-02-20 PROCEDURE — 4040F PNEUMOC VAC/ADMIN/RCVD: CPT | Performed by: NURSE PRACTITIONER

## 2019-02-20 PROCEDURE — G8427 DOCREV CUR MEDS BY ELIG CLIN: HCPCS | Performed by: NURSE PRACTITIONER

## 2019-02-20 PROCEDURE — G8598 ASA/ANTIPLAT THER USED: HCPCS | Performed by: NURSE PRACTITIONER

## 2019-02-20 PROCEDURE — G8419 CALC BMI OUT NRM PARAM NOF/U: HCPCS | Performed by: NURSE PRACTITIONER

## 2019-02-20 PROCEDURE — 1101F PT FALLS ASSESS-DOCD LE1/YR: CPT | Performed by: NURSE PRACTITIONER

## 2019-02-20 PROCEDURE — 1090F PRES/ABSN URINE INCON ASSESS: CPT | Performed by: NURSE PRACTITIONER

## 2019-02-20 PROCEDURE — 1123F ACP DISCUSS/DSCN MKR DOCD: CPT | Performed by: NURSE PRACTITIONER

## 2019-02-20 PROCEDURE — G8482 FLU IMMUNIZE ORDER/ADMIN: HCPCS | Performed by: NURSE PRACTITIONER

## 2019-02-20 PROCEDURE — 99204 OFFICE O/P NEW MOD 45 MIN: CPT | Performed by: NURSE PRACTITIONER

## 2019-02-20 PROCEDURE — 3017F COLORECTAL CA SCREEN DOC REV: CPT | Performed by: NURSE PRACTITIONER

## 2019-02-20 PROCEDURE — 1036F TOBACCO NON-USER: CPT | Performed by: NURSE PRACTITIONER

## 2019-02-20 RX ORDER — DIPHENHYDRAMINE HYDROCHLORIDE 50 MG/ML
50 INJECTION INTRAMUSCULAR; INTRAVENOUS ONCE
Status: CANCELLED | OUTPATIENT
Start: 2019-02-20 | End: 2019-02-20

## 2019-02-20 RX ORDER — 0.9 % SODIUM CHLORIDE 0.9 %
10 VIAL (ML) INJECTION ONCE
Status: CANCELLED | OUTPATIENT
Start: 2019-02-20 | End: 2019-02-20

## 2019-02-20 RX ORDER — METHYLPREDNISOLONE SODIUM SUCCINATE 125 MG/2ML
125 INJECTION, POWDER, LYOPHILIZED, FOR SOLUTION INTRAMUSCULAR; INTRAVENOUS ONCE
Status: CANCELLED | OUTPATIENT
Start: 2019-02-20 | End: 2019-02-20

## 2019-02-20 RX ORDER — SODIUM CHLORIDE 9 MG/ML
100 INJECTION, SOLUTION INTRAVENOUS CONTINUOUS
Status: CANCELLED | OUTPATIENT
Start: 2019-02-20

## 2019-02-20 ASSESSMENT — ENCOUNTER SYMPTOMS
COUGH: 0
NAUSEA: 0
SHORTNESS OF BREATH: 0
CONSTIPATION: 1
DIARRHEA: 0
VOMITING: 0
EYE ITCHING: 1
BACK PAIN: 1

## 2019-02-27 ENCOUNTER — HOSPITAL ENCOUNTER (OUTPATIENT)
Dept: GENERAL RADIOLOGY | Age: 64
Discharge: HOME | End: 2019-02-27
Payer: MEDICARE

## 2019-02-27 ENCOUNTER — HOSPITAL ENCOUNTER (OUTPATIENT)
Age: 64
Discharge: HOME | End: 2019-02-27
Payer: MEDICARE

## 2019-02-27 DIAGNOSIS — M81.0 AGE-RELATED OSTEOPOROSIS WITHOUT CURRENT PATHOLOGICAL FRACTURE: ICD-10-CM

## 2019-02-27 LAB
C-REACTIVE PROTEIN: 0.11 MG/DL (ref 0–1)
CALCIUM URINE: 1.8 MG/DL
CALCIUM URINE: 21 MG/24HR (ref 100–240)
CREATININE URINE: 0.6 GM/24HR
CREATININE URINE: 52.2 MG/DL
HOURS COLLECTED: 24 HRS
MAGNESIUM: 2.1 MG/DL (ref 1.6–2.4)
SEDIMENTATION RATE, ERYTHROCYTE: 28 MM/HR (ref 0–20)
SODIUM URINE: 67 MEQ/L
SODIUM URINE: 77 MEQ/24HR (ref 75–200)
URINE VOLUME MEASURE: 1150 ML
URINE VOLUME, 24 HOUR: 1150 ML
URINE VOLUME, 24 HOUR: 1150 ML

## 2019-02-27 PROCEDURE — 72072 X-RAY EXAM THORAC SPINE 3VWS: CPT

## 2019-02-27 PROCEDURE — 84300 ASSAY OF URINE SODIUM: CPT

## 2019-02-27 PROCEDURE — 82570 ASSAY OF URINE CREATININE: CPT

## 2019-02-27 PROCEDURE — 72100 X-RAY EXAM L-S SPINE 2/3 VWS: CPT

## 2019-02-27 PROCEDURE — 36415 COLL VENOUS BLD VENIPUNCTURE: CPT

## 2019-02-27 PROCEDURE — 82340 ASSAY OF CALCIUM IN URINE: CPT

## 2019-02-27 PROCEDURE — 85651 RBC SED RATE NONAUTOMATED: CPT

## 2019-02-27 PROCEDURE — 86140 C-REACTIVE PROTEIN: CPT

## 2019-02-27 PROCEDURE — 83735 ASSAY OF MAGNESIUM: CPT

## 2019-03-05 ENCOUNTER — CARE COORDINATION (OUTPATIENT)
Dept: CARE COORDINATION | Age: 64
End: 2019-03-05

## 2019-03-05 ENCOUNTER — PATIENT MESSAGE (OUTPATIENT)
Dept: RHEUMATOLOGY | Age: 64
End: 2019-03-05

## 2019-03-05 ENCOUNTER — TELEPHONE (OUTPATIENT)
Dept: PHYSICAL MEDICINE AND REHAB | Age: 64
End: 2019-03-05

## 2019-03-06 ENCOUNTER — PATIENT MESSAGE (OUTPATIENT)
Dept: RHEUMATOLOGY | Age: 64
End: 2019-03-06

## 2019-03-11 ENCOUNTER — OFFICE VISIT (OUTPATIENT)
Dept: PSYCHOLOGY | Age: 64
End: 2019-03-11
Payer: MEDICARE

## 2019-03-11 DIAGNOSIS — M81.0 AGE-RELATED OSTEOPOROSIS WITHOUT CURRENT PATHOLOGICAL FRACTURE: ICD-10-CM

## 2019-03-11 DIAGNOSIS — F41.1 GENERALIZED ANXIETY DISORDER: Primary | Chronic | ICD-10-CM

## 2019-03-11 DIAGNOSIS — I63.00 CEREBRAL INFARCTION DUE TO THROMBOSIS OF PRECEREBRAL ARTERY (HCC): Chronic | ICD-10-CM

## 2019-03-11 PROCEDURE — 90834 PSYTX W PT 45 MINUTES: CPT | Performed by: PSYCHOLOGIST

## 2019-03-12 ENCOUNTER — OFFICE VISIT (OUTPATIENT)
Dept: NEPHROLOGY | Age: 64
End: 2019-03-12
Payer: MEDICARE

## 2019-03-12 VITALS
SYSTOLIC BLOOD PRESSURE: 132 MMHG | HEART RATE: 58 BPM | OXYGEN SATURATION: 97 % | WEIGHT: 130.4 LBS | BODY MASS INDEX: 24.84 KG/M2 | DIASTOLIC BLOOD PRESSURE: 69 MMHG

## 2019-03-12 DIAGNOSIS — N18.4 CHRONIC KIDNEY DISEASE, STAGE IV (SEVERE) (HCC): Primary | ICD-10-CM

## 2019-03-12 DIAGNOSIS — N18.4 STAGE 4 CHRONIC KIDNEY DISEASE (HCC): ICD-10-CM

## 2019-03-12 DIAGNOSIS — Z51.81 MEDICATION MONITORING ENCOUNTER: Primary | ICD-10-CM

## 2019-03-12 DIAGNOSIS — I10 ESSENTIAL HYPERTENSION: ICD-10-CM

## 2019-03-12 PROCEDURE — G8427 DOCREV CUR MEDS BY ELIG CLIN: HCPCS | Performed by: INTERNAL MEDICINE

## 2019-03-12 PROCEDURE — 1101F PT FALLS ASSESS-DOCD LE1/YR: CPT | Performed by: INTERNAL MEDICINE

## 2019-03-12 PROCEDURE — 99213 OFFICE O/P EST LOW 20 MIN: CPT | Performed by: INTERNAL MEDICINE

## 2019-03-12 PROCEDURE — G8482 FLU IMMUNIZE ORDER/ADMIN: HCPCS | Performed by: INTERNAL MEDICINE

## 2019-03-12 PROCEDURE — 1123F ACP DISCUSS/DSCN MKR DOCD: CPT | Performed by: INTERNAL MEDICINE

## 2019-03-12 PROCEDURE — 1036F TOBACCO NON-USER: CPT | Performed by: INTERNAL MEDICINE

## 2019-03-12 PROCEDURE — G8399 PT W/DXA RESULTS DOCUMENT: HCPCS | Performed by: INTERNAL MEDICINE

## 2019-03-12 PROCEDURE — 1090F PRES/ABSN URINE INCON ASSESS: CPT | Performed by: INTERNAL MEDICINE

## 2019-03-12 PROCEDURE — 4040F PNEUMOC VAC/ADMIN/RCVD: CPT | Performed by: INTERNAL MEDICINE

## 2019-03-12 PROCEDURE — G8598 ASA/ANTIPLAT THER USED: HCPCS | Performed by: INTERNAL MEDICINE

## 2019-03-12 PROCEDURE — G8420 CALC BMI NORM PARAMETERS: HCPCS | Performed by: INTERNAL MEDICINE

## 2019-03-12 PROCEDURE — 3017F COLORECTAL CA SCREEN DOC REV: CPT | Performed by: INTERNAL MEDICINE

## 2019-03-12 RX ORDER — OYSTER SHELL CALCIUM WITH VITAMIN D 500; 200 MG/1; [IU]/1
2 TABLET, FILM COATED ORAL 2 TIMES DAILY
Qty: 30 TABLET | Refills: 3 | Status: SHIPPED | OUTPATIENT
Start: 2019-03-12 | End: 2019-08-01 | Stop reason: SDUPTHER

## 2019-03-13 ENCOUNTER — TELEPHONE (OUTPATIENT)
Dept: RHEUMATOLOGY | Age: 64
End: 2019-03-13

## 2019-03-18 ENCOUNTER — APPOINTMENT (OUTPATIENT)
Dept: CT IMAGING | Age: 64
End: 2019-03-18
Payer: MEDICARE

## 2019-03-18 ENCOUNTER — HOSPITAL ENCOUNTER (OUTPATIENT)
Dept: NURSING | Age: 64
Discharge: HOME | End: 2019-03-18
Payer: MEDICARE

## 2019-03-18 ENCOUNTER — HOSPITAL ENCOUNTER (EMERGENCY)
Age: 64
Discharge: HOME | End: 2019-03-18
Attending: EMERGENCY MEDICINE
Payer: MEDICARE

## 2019-03-18 VITALS
SYSTOLIC BLOOD PRESSURE: 125 MMHG | WEIGHT: 130 LBS | HEIGHT: 60 IN | BODY MASS INDEX: 25.52 KG/M2 | DIASTOLIC BLOOD PRESSURE: 60 MMHG | OXYGEN SATURATION: 100 % | RESPIRATION RATE: 16 BRPM | TEMPERATURE: 97.7 F | HEART RATE: 77 BPM

## 2019-03-18 VITALS
RESPIRATION RATE: 20 BRPM | HEART RATE: 81 BPM | SYSTOLIC BLOOD PRESSURE: 118 MMHG | TEMPERATURE: 96.6 F | DIASTOLIC BLOOD PRESSURE: 56 MMHG

## 2019-03-18 DIAGNOSIS — M81.0 AGE-RELATED OSTEOPOROSIS WITHOUT CURRENT PATHOLOGICAL FRACTURE: Primary | ICD-10-CM

## 2019-03-18 DIAGNOSIS — M54.12 LEFT CERVICAL RADICULOPATHY: Primary | ICD-10-CM

## 2019-03-18 DIAGNOSIS — M75.42 IMPINGEMENT SYNDROME OF LEFT SHOULDER: ICD-10-CM

## 2019-03-18 PROCEDURE — 99283 EMERGENCY DEPT VISIT LOW MDM: CPT

## 2019-03-18 PROCEDURE — 6360000002 HC RX W HCPCS: Performed by: NURSE PRACTITIONER

## 2019-03-18 PROCEDURE — 6360000002 HC RX W HCPCS: Performed by: EMERGENCY MEDICINE

## 2019-03-18 PROCEDURE — 6370000000 HC RX 637 (ALT 250 FOR IP): Performed by: EMERGENCY MEDICINE

## 2019-03-18 PROCEDURE — 96372 THER/PROPH/DIAG INJ SC/IM: CPT

## 2019-03-18 PROCEDURE — 72125 CT NECK SPINE W/O DYE: CPT

## 2019-03-18 RX ORDER — SODIUM CHLORIDE 9 MG/ML
100 INJECTION, SOLUTION INTRAVENOUS CONTINUOUS
Status: CANCELLED | OUTPATIENT
Start: 2019-03-18

## 2019-03-18 RX ORDER — METHYLPREDNISOLONE ACETATE 80 MG/ML
80 INJECTION, SUSPENSION INTRA-ARTICULAR; INTRALESIONAL; INTRAMUSCULAR; SOFT TISSUE ONCE
Status: COMPLETED | OUTPATIENT
Start: 2019-03-18 | End: 2019-03-18

## 2019-03-18 RX ORDER — KETOROLAC TROMETHAMINE 30 MG/ML
30 INJECTION, SOLUTION INTRAMUSCULAR; INTRAVENOUS ONCE
Status: DISCONTINUED | OUTPATIENT
Start: 2019-03-18 | End: 2019-03-18

## 2019-03-18 RX ORDER — PREDNISONE 10 MG/1
TABLET ORAL
Qty: 15 TABLET | Refills: 0 | Status: SHIPPED | OUTPATIENT
Start: 2019-03-18 | End: 2019-06-06 | Stop reason: ALTCHOICE

## 2019-03-18 RX ORDER — TRAMADOL HYDROCHLORIDE 50 MG/1
50 TABLET ORAL EVERY 6 HOURS PRN
Qty: 12 TABLET | Refills: 0 | Status: SHIPPED | OUTPATIENT
Start: 2019-03-18 | End: 2019-03-21

## 2019-03-18 RX ORDER — METHYLPREDNISOLONE SODIUM SUCCINATE 125 MG/2ML
125 INJECTION, POWDER, LYOPHILIZED, FOR SOLUTION INTRAMUSCULAR; INTRAVENOUS ONCE
Status: CANCELLED | OUTPATIENT
Start: 2019-03-18 | End: 2019-03-18

## 2019-03-18 RX ORDER — LUBIPROSTONE 8 UG/1
24 CAPSULE, GELATIN COATED ORAL
COMMUNITY
End: 2020-02-05

## 2019-03-18 RX ORDER — 0.9 % SODIUM CHLORIDE 0.9 %
10 VIAL (ML) INJECTION ONCE
Status: CANCELLED | OUTPATIENT
Start: 2019-03-18 | End: 2019-03-18

## 2019-03-18 RX ORDER — DIPHENHYDRAMINE HYDROCHLORIDE 50 MG/ML
50 INJECTION INTRAMUSCULAR; INTRAVENOUS ONCE
Status: CANCELLED | OUTPATIENT
Start: 2019-03-18 | End: 2019-03-18

## 2019-03-18 RX ORDER — HYDROCODONE BITARTRATE AND ACETAMINOPHEN 5; 325 MG/1; MG/1
1 TABLET ORAL ONCE
Status: COMPLETED | OUTPATIENT
Start: 2019-03-18 | End: 2019-03-18

## 2019-03-18 RX ADMIN — METHYLPREDNISOLONE ACETATE 80 MG: 80 INJECTION, SUSPENSION INTRA-ARTICULAR; INTRALESIONAL; INTRAMUSCULAR; SOFT TISSUE at 11:51

## 2019-03-18 RX ADMIN — DENOSUMAB 60 MG: 60 INJECTION SUBCUTANEOUS at 10:32

## 2019-03-18 RX ADMIN — HYDROCODONE BITARTRATE AND ACETAMINOPHEN 1 TABLET: 5; 325 TABLET ORAL at 11:50

## 2019-03-18 ASSESSMENT — PAIN DESCRIPTION - LOCATION
LOCATION: NECK;ARM
LOCATION: ARM

## 2019-03-18 ASSESSMENT — ENCOUNTER SYMPTOMS
BACK PAIN: 0
NAUSEA: 0
SHORTNESS OF BREATH: 0
ABDOMINAL PAIN: 0
EYE PAIN: 0
SORE THROAT: 0
RHINORRHEA: 0
EYE DISCHARGE: 0
VOMITING: 0
WHEEZING: 0
DIARRHEA: 0
COUGH: 0

## 2019-03-18 ASSESSMENT — PAIN DESCRIPTION - DESCRIPTORS: DESCRIPTORS: SHARP

## 2019-03-18 ASSESSMENT — PAIN DESCRIPTION - ORIENTATION
ORIENTATION: LEFT
ORIENTATION: LEFT

## 2019-03-18 ASSESSMENT — PAIN SCALES - GENERAL
PAINLEVEL_OUTOF10: 10
PAINLEVEL_OUTOF10: 10
PAINLEVEL_OUTOF10: 6

## 2019-03-18 ASSESSMENT — PAIN DESCRIPTION - FREQUENCY: FREQUENCY: CONTINUOUS

## 2019-03-18 ASSESSMENT — PAIN SCALES - WONG BAKER: WONGBAKER_NUMERICALRESPONSE: 6

## 2019-03-18 ASSESSMENT — PAIN DESCRIPTION - ONSET: ONSET: PROGRESSIVE

## 2019-03-18 ASSESSMENT — PAIN DESCRIPTION - PROGRESSION: CLINICAL_PROGRESSION: GRADUALLY WORSENING

## 2019-03-18 ASSESSMENT — PAIN DESCRIPTION - PAIN TYPE: TYPE: ACUTE PAIN

## 2019-03-19 ENCOUNTER — CARE COORDINATION (OUTPATIENT)
Dept: CARE COORDINATION | Age: 64
End: 2019-03-19

## 2019-03-20 ENCOUNTER — HOSPITAL ENCOUNTER (OUTPATIENT)
Dept: PHYSICAL THERAPY | Age: 64
Setting detail: THERAPIES SERIES
Discharge: HOME | End: 2019-03-20
Payer: MEDICARE

## 2019-03-20 PROCEDURE — 97530 THERAPEUTIC ACTIVITIES: CPT

## 2019-03-20 PROCEDURE — 97161 PT EVAL LOW COMPLEX 20 MIN: CPT

## 2019-03-20 ASSESSMENT — PAIN SCALES - GENERAL: PAINLEVEL_OUTOF10: 7

## 2019-03-20 ASSESSMENT — PAIN DESCRIPTION - LOCATION: LOCATION: SHOULDER

## 2019-03-20 ASSESSMENT — PAIN DESCRIPTION - ORIENTATION: ORIENTATION: LEFT

## 2019-03-25 ENCOUNTER — HOSPITAL ENCOUNTER (OUTPATIENT)
Dept: PHYSICAL THERAPY | Age: 64
Setting detail: THERAPIES SERIES
Discharge: HOME | End: 2019-03-25
Payer: MEDICARE

## 2019-03-25 PROCEDURE — 97110 THERAPEUTIC EXERCISES: CPT

## 2019-03-25 ASSESSMENT — PAIN DESCRIPTION - ORIENTATION: ORIENTATION: RIGHT;LEFT

## 2019-03-25 ASSESSMENT — PAIN SCALES - GENERAL: PAINLEVEL_OUTOF10: 4

## 2019-03-25 ASSESSMENT — PAIN DESCRIPTION - LOCATION: LOCATION: SHOULDER

## 2019-03-26 ENCOUNTER — CARE COORDINATION (OUTPATIENT)
Dept: CARE COORDINATION | Age: 64
End: 2019-03-26

## 2019-03-27 ENCOUNTER — APPOINTMENT (OUTPATIENT)
Dept: GENERAL RADIOLOGY | Age: 64
End: 2019-03-27
Payer: MEDICARE

## 2019-03-27 ENCOUNTER — HOSPITAL ENCOUNTER (EMERGENCY)
Age: 64
Discharge: HOME | End: 2019-03-28
Payer: MEDICARE

## 2019-03-27 ENCOUNTER — APPOINTMENT (OUTPATIENT)
Dept: CT IMAGING | Age: 64
End: 2019-03-27
Payer: MEDICARE

## 2019-03-27 VITALS
WEIGHT: 130 LBS | DIASTOLIC BLOOD PRESSURE: 61 MMHG | OXYGEN SATURATION: 99 % | HEIGHT: 64 IN | HEART RATE: 89 BPM | BODY MASS INDEX: 22.2 KG/M2 | RESPIRATION RATE: 17 BRPM | TEMPERATURE: 98.4 F | SYSTOLIC BLOOD PRESSURE: 110 MMHG

## 2019-03-27 DIAGNOSIS — E86.0 DEHYDRATION: ICD-10-CM

## 2019-03-27 DIAGNOSIS — R19.7 DIARRHEA, UNSPECIFIED TYPE: Primary | ICD-10-CM

## 2019-03-27 LAB
ALBUMIN SERPL-MCNC: 3.9 G/DL (ref 3.5–5.1)
ALP BLD-CCNC: 125 U/L (ref 38–126)
ALT SERPL-CCNC: 15 U/L (ref 11–66)
ANION GAP SERPL CALCULATED.3IONS-SCNC: 18 MEQ/L (ref 8–16)
APTT: 26.8 SECONDS (ref 22–38)
AST SERPL-CCNC: 16 U/L (ref 5–40)
BACTERIA: ABNORMAL /HPF
BASOPHILS # BLD: 0.5 %
BASOPHILS ABSOLUTE: 0 THOU/MM3 (ref 0–0.1)
BILIRUB SERPL-MCNC: 0.2 MG/DL (ref 0.3–1.2)
BILIRUBIN URINE: NEGATIVE
BLOOD, URINE: NEGATIVE
BUN BLDV-MCNC: 36 MG/DL (ref 7–22)
CALCIUM SERPL-MCNC: 6.8 MG/DL (ref 8.5–10.5)
CASTS 2: ABNORMAL /LPF
CASTS UA: ABNORMAL /LPF
CHARACTER, URINE: ABNORMAL
CHLORIDE BLD-SCNC: 109 MEQ/L (ref 98–111)
CO2: 20 MEQ/L (ref 23–33)
COLOR: YELLOW
CREAT SERPL-MCNC: 1.8 MG/DL (ref 0.4–1.2)
CRYSTALS, UA: ABNORMAL
EOSINOPHIL # BLD: 0.6 %
EOSINOPHILS ABSOLUTE: 0.1 THOU/MM3 (ref 0–0.4)
EPITHELIAL CELLS, UA: ABNORMAL /HPF
ERYTHROCYTE [DISTWIDTH] IN BLOOD BY AUTOMATED COUNT: 14 % (ref 11.5–14.5)
ERYTHROCYTE [DISTWIDTH] IN BLOOD BY AUTOMATED COUNT: 48.1 FL (ref 35–45)
FLU A ANTIGEN: NEGATIVE
FLU B ANTIGEN: NEGATIVE
GFR SERPL CREATININE-BSD FRML MDRD: 28 ML/MIN/1.73M2
GLUCOSE BLD-MCNC: 117 MG/DL (ref 70–108)
GLUCOSE URINE: NEGATIVE MG/DL
HCT VFR BLD CALC: 31.8 % (ref 37–47)
HEMOGLOBIN: 10 GM/DL (ref 12–16)
IMMATURE GRANS (ABS): 0.14 THOU/MM3 (ref 0–0.07)
IMMATURE GRANULOCYTES: 1.5 %
INR BLD: 0.83 (ref 0.85–1.13)
KETONES, URINE: NEGATIVE
LACTIC ACID: 0.8 MMOL/L (ref 0.5–2.2)
LEUKOCYTE ESTERASE, URINE: ABNORMAL
LIPASE: 73.9 U/L (ref 5.6–51.3)
LYMPHOCYTES # BLD: 26.1 %
LYMPHOCYTES ABSOLUTE: 2.5 THOU/MM3 (ref 1–4.8)
MCH RBC QN AUTO: 29.9 PG (ref 26–33)
MCHC RBC AUTO-ENTMCNC: 31.4 GM/DL (ref 32.2–35.5)
MCV RBC AUTO: 94.9 FL (ref 81–99)
MISCELLANEOUS 2: ABNORMAL
MONOCYTES # BLD: 7.6 %
MONOCYTES ABSOLUTE: 0.7 THOU/MM3 (ref 0.4–1.3)
NITRITE, URINE: NEGATIVE
NUCLEATED RED BLOOD CELLS: 0 /100 WBC
OSMOLALITY CALCULATION: 301.8 MOSMOL/KG (ref 275–300)
PH UA: 7.5 (ref 5–9)
PLATELET # BLD: 324 THOU/MM3 (ref 130–400)
PMV BLD AUTO: 9.8 FL (ref 9.4–12.4)
POTASSIUM REFLEX MAGNESIUM: 5 MEQ/L (ref 3.5–5.2)
PROCALCITONIN: 0.15 NG/ML (ref 0.01–0.09)
PROTEIN UA: NEGATIVE
RBC # BLD: 3.35 MILL/MM3 (ref 4.2–5.4)
RBC URINE: ABNORMAL /HPF
RENAL EPITHELIAL, UA: ABNORMAL
SEG NEUTROPHILS: 63.7 %
SEGMENTED NEUTROPHILS ABSOLUTE COUNT: 6 THOU/MM3 (ref 1.8–7.7)
SODIUM BLD-SCNC: 147 MEQ/L (ref 135–145)
SPECIFIC GRAVITY, URINE: 1.01 (ref 1–1.03)
TOTAL PROTEIN: 6.2 G/DL (ref 6.1–8)
TROPONIN T: < 0.01 NG/ML
UROBILINOGEN, URINE: 0.2 EU/DL (ref 0–1)
WBC # BLD: 9.4 THOU/MM3 (ref 4.8–10.8)
WBC UA: ABNORMAL /HPF
YEAST: ABNORMAL

## 2019-03-27 PROCEDURE — 83605 ASSAY OF LACTIC ACID: CPT

## 2019-03-27 PROCEDURE — 85025 COMPLETE CBC W/AUTO DIFF WBC: CPT

## 2019-03-27 PROCEDURE — 96360 HYDRATION IV INFUSION INIT: CPT

## 2019-03-27 PROCEDURE — 93005 ELECTROCARDIOGRAM TRACING: CPT | Performed by: NURSE PRACTITIONER

## 2019-03-27 PROCEDURE — 96361 HYDRATE IV INFUSION ADD-ON: CPT

## 2019-03-27 PROCEDURE — 2580000003 HC RX 258: Performed by: NURSE PRACTITIONER

## 2019-03-27 PROCEDURE — 99285 EMERGENCY DEPT VISIT HI MDM: CPT

## 2019-03-27 PROCEDURE — 80053 COMPREHEN METABOLIC PANEL: CPT

## 2019-03-27 PROCEDURE — 84484 ASSAY OF TROPONIN QUANT: CPT

## 2019-03-27 PROCEDURE — 87804 INFLUENZA ASSAY W/OPTIC: CPT

## 2019-03-27 PROCEDURE — 36415 COLL VENOUS BLD VENIPUNCTURE: CPT

## 2019-03-27 PROCEDURE — 85610 PROTHROMBIN TIME: CPT

## 2019-03-27 PROCEDURE — 84145 PROCALCITONIN (PCT): CPT

## 2019-03-27 PROCEDURE — 85730 THROMBOPLASTIN TIME PARTIAL: CPT

## 2019-03-27 PROCEDURE — 70450 CT HEAD/BRAIN W/O DYE: CPT

## 2019-03-27 PROCEDURE — 71045 X-RAY EXAM CHEST 1 VIEW: CPT

## 2019-03-27 PROCEDURE — 87086 URINE CULTURE/COLONY COUNT: CPT

## 2019-03-27 PROCEDURE — 81001 URINALYSIS AUTO W/SCOPE: CPT

## 2019-03-27 PROCEDURE — 83690 ASSAY OF LIPASE: CPT

## 2019-03-27 RX ORDER — 0.9 % SODIUM CHLORIDE 0.9 %
1000 INTRAVENOUS SOLUTION INTRAVENOUS ONCE
Status: COMPLETED | OUTPATIENT
Start: 2019-03-27 | End: 2019-03-27

## 2019-03-27 RX ADMIN — SODIUM CHLORIDE 1000 ML: 9 INJECTION, SOLUTION INTRAVENOUS at 22:00

## 2019-03-27 ASSESSMENT — ENCOUNTER SYMPTOMS
WHEEZING: 0
RHINORRHEA: 0
DIARRHEA: 1
SORE THROAT: 0
SHORTNESS OF BREATH: 0
NAUSEA: 0
BACK PAIN: 0
EYE PAIN: 0
COUGH: 0
VOMITING: 0
ABDOMINAL PAIN: 0
EYE DISCHARGE: 0

## 2019-03-28 ENCOUNTER — APPOINTMENT (OUTPATIENT)
Dept: PHYSICAL THERAPY | Age: 64
End: 2019-03-28
Payer: MEDICARE

## 2019-03-28 ENCOUNTER — CARE COORDINATION (OUTPATIENT)
Dept: CARE COORDINATION | Age: 64
End: 2019-03-28

## 2019-03-28 LAB
EKG ATRIAL RATE: 72 BPM
EKG ATRIAL RATE: 89 BPM
EKG P AXIS: 62 DEGREES
EKG P AXIS: 75 DEGREES
EKG P-R INTERVAL: 146 MS
EKG P-R INTERVAL: 154 MS
EKG Q-T INTERVAL: 384 MS
EKG Q-T INTERVAL: 386 MS
EKG QRS DURATION: 100 MS
EKG QRS DURATION: 90 MS
EKG QTC CALCULATION (BAZETT): 420 MS
EKG QTC CALCULATION (BAZETT): 434 MS
EKG R AXIS: 78 DEGREES
EKG R AXIS: 82 DEGREES
EKG T AXIS: 65 DEGREES
EKG T AXIS: 65 DEGREES
EKG VENTRICULAR RATE: 72 BPM
EKG VENTRICULAR RATE: 76 BPM
ORGANISM: ABNORMAL
URINE CULTURE REFLEX: ABNORMAL

## 2019-03-28 PROCEDURE — 93010 ELECTROCARDIOGRAM REPORT: CPT | Performed by: INTERNAL MEDICINE

## 2019-03-28 NOTE — CARE COORDINATION
Ambulatory Care Coordination  ED Follow up Call    Reason for ED visit:  Extremity Weakness     Status:     improved    Did you call your PCP prior to going to the ED? No      Did you receive a discharge instructions from the Emergency Room? Yes  Review of Instructions:     Understands what to report/when to return?:  Yes   Understands discharge instructions?:  Yes   Following discharge instructions?:  Yes     Are there any new complaints of pain? No  New Pain Meds? No   Constipation prophylaxis needed? No    Are there any other complaints/concerns that you wish to tell your provider? Not at this time. FU appts/Provider:    Future Appointments   Date Time Provider Jaya Adela   3/29/2019  8:00 AM Tony Iqbal, PhD [facility] Psychol [address]   4/3/2019  9:00 AM Lesley Ricks, PT STRZ PT None   4/15/2019 10:00 AM Tony Iqbal PhD SRP[facility] Psychol [address]   5/10/2019  2:15 PM Mumtaz Betts MD [address] [place] [address] [facility] [place]   5/13/2019 10:00 AM Tony Iqbal PhD [address] [place] [address]   6/6/2019  9:40 AM Dg Corbin MD [place] [facility] [place]   6/10/2019 10:00 AM Tony Iqbal PhD [address] [place] [address]   7/17/2019 11:00 AM [facility]OS MAN[facility]EL [place] [facility] [place]   9/19/2019 10:30 AM STR MINOR ROOM 8 STRZ OP NURS None     New Medications?:   No    Medication Reconciliation by phone - Yes  Understands Medications? Yes  Taking Medications? Yes  Can you swallow your pills? Yes    Any further needs in the home i.e. Equipment? No  Link to services in community?:  No    I spoke with the patient re: ed visit. Patient was seen and treated for extremity weakness. Patient was at a birthday party when she began experiencing diarrhea, multiple times. The patient then became weak, almost falling in to the wall. Patient denies current diarrhea. No nausea or vomiting. Admits to minimal dizziness.   Instructed patient on importance of early symptom recognition to prevent

## 2019-03-29 ENCOUNTER — OFFICE VISIT (OUTPATIENT)
Dept: PSYCHOLOGY | Age: 64
End: 2019-03-29
Payer: MEDICARE

## 2019-03-29 DIAGNOSIS — F41.1 GENERALIZED ANXIETY DISORDER: Primary | Chronic | ICD-10-CM

## 2019-03-29 PROCEDURE — 90834 PSYTX W PT 45 MINUTES: CPT | Performed by: PSYCHOLOGIST

## 2019-04-01 ENCOUNTER — APPOINTMENT (OUTPATIENT)
Dept: PHYSICAL THERAPY | Age: 64
End: 2019-04-01
Payer: MEDICARE

## 2019-04-02 ENCOUNTER — NURSE ONLY (OUTPATIENT)
Dept: FAMILY MEDICINE CLINIC | Age: 64
End: 2019-04-02
Payer: MEDICARE

## 2019-04-02 DIAGNOSIS — Z51.81 MEDICATION MONITORING ENCOUNTER: ICD-10-CM

## 2019-04-02 LAB
ANION GAP SERPL CALCULATED.3IONS-SCNC: 11 MEQ/L (ref 8–16)
BUN BLDV-MCNC: 30 MG/DL (ref 7–22)
CALCIUM SERPL-MCNC: 8 MG/DL (ref 8.5–10.5)
CHLORIDE BLD-SCNC: 111 MEQ/L (ref 98–111)
CO2: 23 MEQ/L (ref 23–33)
CREAT SERPL-MCNC: 1.7 MG/DL (ref 0.4–1.2)
GFR SERPL CREATININE-BSD FRML MDRD: 30 ML/MIN/1.73M2
GLUCOSE BLD-MCNC: 92 MG/DL (ref 70–108)
POTASSIUM SERPL-SCNC: 4.6 MEQ/L (ref 3.5–5.2)
SODIUM BLD-SCNC: 145 MEQ/L (ref 135–145)

## 2019-04-02 PROCEDURE — 36415 COLL VENOUS BLD VENIPUNCTURE: CPT | Performed by: FAMILY MEDICINE

## 2019-04-03 ENCOUNTER — APPOINTMENT (OUTPATIENT)
Dept: PHYSICAL THERAPY | Age: 64
End: 2019-04-03
Payer: MEDICARE

## 2019-04-05 ENCOUNTER — APPOINTMENT (OUTPATIENT)
Dept: PHYSICAL THERAPY | Age: 64
End: 2019-04-05
Payer: MEDICARE

## 2019-04-08 ENCOUNTER — HOSPITAL ENCOUNTER (OUTPATIENT)
Dept: PHYSICAL THERAPY | Age: 64
Setting detail: THERAPIES SERIES
Discharge: HOME | End: 2019-04-08
Payer: MEDICARE

## 2019-04-08 PROCEDURE — 97110 THERAPEUTIC EXERCISES: CPT

## 2019-04-08 ASSESSMENT — PAIN SCALES - GENERAL: PAINLEVEL_OUTOF10: 3

## 2019-04-08 ASSESSMENT — PAIN DESCRIPTION - LOCATION: LOCATION: SHOULDER

## 2019-04-08 ASSESSMENT — PAIN DESCRIPTION - ORIENTATION: ORIENTATION: RIGHT;LEFT

## 2019-04-08 NOTE — PROGRESS NOTES
[place]     Time In: 6970  Time Out: 1219  Minutes: 31  Timed Code Treatment Minutes: 31 Minutes             Date: 2019  Patient Name: Fatou Mejias,  Gender:  female        CSN: [id number]   : 1955  (70 y.o.)       Referring Practitioner: Swetha Tate CNP      Diagnosis: Age-related osteoporosis without current pathological fracture M81.0  Treatment Diagnosis: osteoporosis affecting body mechanics and posture   Additional Pertinent Hx: PMH: HTN, kidney stones, anxiety, memory issueus, fibromyalgia, osteoporosis, 2 large stroke 6 years ago. General:  PT Visit Information  Onset Date: 19  PT Insurance Information: Medicare- unlimited visits based on medical necessity and cap, aquatics and modalities covered except ionto, HP/CP  Total # of Visits to Date: 3  Plan of Care/Certification Expiration Date: 05/15/19               Subjective:  Family / Caregiver Present: No  Comments: [facility] [address] [place] [address] in July. MRI of left shoulder scheduled for 3/26/19 with possibility of surgery. Other (Comment): Referral for bone fragility program.      Subjective: Patient reporting bilateral shoulder pain 3/10 today. Patient admits to not doing HEP at home. Patient stating two weeks ago had low blood pressure and went to Emergency Department. Per patient she was told to drink more fluids and double her calcium or potassium patient unsure which one at this time. Denies having any heart issues with ED visit and will see patient today and patient agreeable to session.      Pain:  Patient Currently in Pain: Yes  Pain Assessment: 0-10  Pain Level: 3  Pain Location: Shoulder  Pain Orientation: Right, Left      Objective  Exercises  Exercise 3: supine with 2 pillows: abdominal bracing 10 x 5 seconds,  SAQ, 10 x 5 seconds  Exercise 4: hip hinge technique with sit<> stand x 5   Exercise 5: again goal 3: Pt will perform B step stance balance x10 sec to improve dynamic balance for gait and decreased fall risk. Short term goal 4: Pt will demo good postural awareness with <2 verbal cues during therapy session to carry over to functional mobility and tasks. Long term goals  Time Frame for Long term goals : 8 weeks  Long term goal 1: Pt will be independent and compliant with HEP to achieve above goals.      Maame Citizen, [id number]

## 2019-04-16 ENCOUNTER — CARE COORDINATION (OUTPATIENT)
Dept: CARE COORDINATION | Age: 64
End: 2019-04-16

## 2019-04-17 ENCOUNTER — TELEPHONE (OUTPATIENT)
Dept: RHEUMATOLOGY | Age: 64
End: 2019-04-17

## 2019-04-17 ENCOUNTER — NURSE ONLY (OUTPATIENT)
Dept: FAMILY MEDICINE CLINIC | Age: 64
End: 2019-04-17
Payer: MEDICARE

## 2019-04-17 DIAGNOSIS — Z51.81 MEDICATION MONITORING ENCOUNTER: ICD-10-CM

## 2019-04-17 LAB
ANION GAP SERPL CALCULATED.3IONS-SCNC: 12 MEQ/L (ref 8–16)
BUN BLDV-MCNC: 32 MG/DL (ref 7–22)
CALCIUM SERPL-MCNC: 8.4 MG/DL (ref 8.5–10.5)
CHLORIDE BLD-SCNC: 107 MEQ/L (ref 98–111)
CO2: 26 MEQ/L (ref 23–33)
CREAT SERPL-MCNC: 1.7 MG/DL (ref 0.4–1.2)
GFR SERPL CREATININE-BSD FRML MDRD: 30 ML/MIN/1.73M2
GLUCOSE BLD-MCNC: 99 MG/DL (ref 70–108)
POTASSIUM SERPL-SCNC: 4.9 MEQ/L (ref 3.5–5.2)
SODIUM BLD-SCNC: 145 MEQ/L (ref 135–145)

## 2019-04-17 PROCEDURE — 36415 COLL VENOUS BLD VENIPUNCTURE: CPT | Performed by: FAMILY MEDICINE

## 2019-04-17 NOTE — TELEPHONE ENCOUNTER
----- Message from CARLOS MANUEL Valadez CNP sent at 4/17/2019  4:13 PM EDT -----  Please let patient know that her calcium level went back up to 8.4. She can go back to taking the normal 1000 mg dose of calcium. No more rechecks needed.

## 2019-04-24 ENCOUNTER — OFFICE VISIT (OUTPATIENT)
Dept: PSYCHOLOGY | Age: 64
End: 2019-04-24
Payer: MEDICARE

## 2019-04-24 ENCOUNTER — CARE COORDINATION (OUTPATIENT)
Dept: CARE COORDINATION | Age: 64
End: 2019-04-24

## 2019-04-24 DIAGNOSIS — F41.1 GENERALIZED ANXIETY DISORDER: Primary | Chronic | ICD-10-CM

## 2019-04-24 PROCEDURE — 90834 PSYTX W PT 45 MINUTES: CPT | Performed by: PSYCHOLOGIST

## 2019-04-24 NOTE — CARE COORDINATION
Ambulatory Care Coordination Note  4/24/2019  CM Risk Score: 6  Jd Mortality Risk Score: 3    ACC: Mj Catherine RN    Summary Note: Spoke with [facility]. Continues with PT for osteoporosis. States she has some soreness in legs. Medications are managed by . No issues. Upcoming appointment with neuro, rheumatology. Recent appointment with psych. Encouraged to use same day appointments instead of going to ED. Education complete and goals met. Ready for graduation. Encouraged to contact office with any issues or symptoms. Care Coordination Interventions    Program Enrollment:  Complex Care  Referral from Primary Care Provider:  No  Suggested Interventions and Community Resources  Behavorial Health:  Completed  Diabetes Education:  Completed  Fall Risk Prevention:  Completed  Physical Therapy:  Completed         Goals Addressed                 This Visit's Progress     COMPLETED: Medication Management        I will take my medication as directed. I will notify my provider of any problems with medications, like adverse effects or side effects. I will notify my provider/Care Coordinator if I am unable to afford my medications. I will notify my provider for advice before I stop taking any of my medication. Barriers: impairment:  cognitive  Plan for overcoming my barriers:  manages medications  Confidence: 9/10  Anticipated Goal Completion Date: ongoing, updated completion 6/5/19            Prior to Admission medications    Medication Sig Start Date End Date Taking?  Authorizing Provider   lubiprostone (AMITIZA) 24 MCG capsule Take 24 mcg by mouth daily (with breakfast)    Historical Provider, MD   predniSONE (DELTASONE) 10 MG tablet 2 tablets 2 times a day for 2 days then 1  Tablet 2 times a day for 2 days, then 1 tablet daily till gone 3/18/19   Jewel Butler MD   denosumab (PROLIA) 60 MG/ML SOLN SC injection Inject 60 mg into the skin once    Historical Provider, MD calcium-vitamin D (OSCAL 500/200 D-3) 500-200 MG-UNIT per tablet Take 2 tablets by mouth 2 times daily 3/12/19   Alexa Almanza MD   amLODIPine (NORVASC) 10 MG tablet Take 1 tablet by mouth daily 1/28/19   Alexa Almanza MD   buPROPion [facility] SR) 150 MG extended release tablet TAKE 1 TABLET TWICE A DAY 1/15/19   Rinku Irving MD   traZODone (DESYREL) 50 MG tablet TAKE 1 TABLET NIGHTLY 1/15/19   Rinku Irving MD   clopidogrel (PLAVIX) 75 MG tablet TAKE 1 TABLET DAILY 1/15/19   Rinku Irving MD   vitamin D (ERGOCALCIFEROL) 55177 units CAPS capsule Take 1 capsule by mouth once a week 1/15/19   Rinku Irving MD   chlordiazePOXIDE-clidinium (LIBRAX) 5-2.5 MG per capsule TAKE 1 CAPSULE TWICE A DAY AS NEEDED FOR PAIN. 1/15/19   Rinku Irving MD   ranitidine (ZANTAC) 150 MG tablet Take 1 tablet by mouth 2 times daily 1/15/19   Rinku Irving MD   omeprazole (PRILOSEC) 20 MG delayed release capsule TK 1 C PO QD 1/15/19   Rinku Irving MD   simvastatin (ZOCOR) 20 MG tablet TAKE 1 TABLET DAILY 1/15/19   Rinku Irving MD   Handicap Placard MISC by Does not apply route Expires in 5 years. Dx: 1/15/19   Rinku Irving MD   linaclotide [facility]) 145 MCG capsule Take 1 capsule by mouth daily as needed (constipation) 9/4/18   Rinku Irving MD   folic acid (FOLVITE) 498 MCG tablet Take 400 mcg by mouth daily    Historical Provider, MD   loratadine (CLARITIN) 10 MG tablet Take 1 tablet by mouth daily 9/27/17   Jacqulin Sandhoff, MD   b complex vitamins capsule Take 1 capsule by mouth daily 6/2/16   Liam Storey MD   Probiotic Product (PROBIOTIC DAILY PO) Take  by mouth. Historical Provider, MD   FISH OIL 1,000 mg by Does not apply route 2 times daily Indications: Decreased Energy     Historical Provider, MD   aspirin 81 MG tablet Take 81 mg by mouth daily.       Historical Provider, MD       Future Appointments   Date Time Provider Jaya Chapman

## 2019-05-08 ENCOUNTER — HOSPITAL ENCOUNTER (OUTPATIENT)
Dept: PHYSICAL THERAPY | Age: 64
Setting detail: THERAPIES SERIES
Discharge: HOME | End: 2019-05-08
Payer: MEDICARE

## 2019-05-08 PROCEDURE — 97530 THERAPEUTIC ACTIVITIES: CPT

## 2019-05-08 PROCEDURE — 97112 NEUROMUSCULAR REEDUCATION: CPT

## 2019-05-08 NOTE — PROGRESS NOTES
** PLEASE SIGN, DATE AND TIME CERTIFICATION BELOW AND RETURN TO [facility] OUTPATIENT REHABILITATION (FAX #: [phone]). ATTEST/CO-SIGN IF ACCESSING VIA [facility]. THANK YOU.**    I certify that I have examined the patient below and determined that Physical Medicine and Rehabilitation service is necessary and that I approve the established plan of care for up to 90 days or as specifically noted. Attestation, signature or co-signature of physician indicates approval of certification requirements.    ________________________ ____________ __________  Physician Signature   Date   Time     [address]     Time In: 1104  Time Out: 9940  Minutes: 44  Timed Code Treatment Minutes: 44 Minutes    Date: 2019  Patient Name: Guido Blue,  Gender:  female        CSN: [id number]   : 1955  (70 y.o.)       Referring Practitioner: April Escalante CNP      Diagnosis: Age-related osteoporosis without current pathological fracture M81.0  Treatment Diagnosis: osteoporosis affecting body mechanics and posture   Additional Pertinent Hx: PMH: HTN, kidney stones, anxiety, memory issueus, fibromyalgia, osteoporosis, 2 large stroke 6 years ago. General:  PT Visit Information  Onset Date: 19  PT Insurance Information: Medicare- unlimited visits based on medical necessity and cap, aquatics and modalities covered except ionto, HP/CP  Total # of Visits to Date: 4  Plan of Care/Certification Expiration Date: 19  Progress Note Counter: 4/10 for PN. Subjective:  Chart Reviewed: Yes  Comments: Follow up with [facility] in July. Follow up with Dr. Raine Restrepo for right shoulder pain 19. Other (Comment): Referral for bone fragility program.      Subjective: Pt reports she tries to do some exercises at home but doesn't feel very steady. Pt reports she is scared to walk in the grass.  Pt reports walking

## 2019-05-10 ENCOUNTER — HOSPITAL ENCOUNTER (OUTPATIENT)
Dept: PHYSICAL THERAPY | Age: 64
Setting detail: THERAPIES SERIES
Discharge: HOME | End: 2019-05-10
Payer: MEDICARE

## 2019-05-10 ENCOUNTER — OFFICE VISIT (OUTPATIENT)
Dept: NEUROLOGY | Age: 64
End: 2019-05-10
Payer: MEDICARE

## 2019-05-10 VITALS
SYSTOLIC BLOOD PRESSURE: 116 MMHG | DIASTOLIC BLOOD PRESSURE: 68 MMHG | BODY MASS INDEX: 22.2 KG/M2 | HEART RATE: 66 BPM | WEIGHT: 130 LBS | HEIGHT: 64 IN

## 2019-05-10 DIAGNOSIS — G44.209 TENSION HEADACHE: ICD-10-CM

## 2019-05-10 DIAGNOSIS — R41.3 MEMORY PROBLEM: Primary | ICD-10-CM

## 2019-05-10 PROCEDURE — G8420 CALC BMI NORM PARAMETERS: HCPCS | Performed by: PSYCHIATRY & NEUROLOGY

## 2019-05-10 PROCEDURE — 1090F PRES/ABSN URINE INCON ASSESS: CPT | Performed by: PSYCHIATRY & NEUROLOGY

## 2019-05-10 PROCEDURE — 1036F TOBACCO NON-USER: CPT | Performed by: PSYCHIATRY & NEUROLOGY

## 2019-05-10 PROCEDURE — 99214 OFFICE O/P EST MOD 30 MIN: CPT | Performed by: PSYCHIATRY & NEUROLOGY

## 2019-05-10 PROCEDURE — 1123F ACP DISCUSS/DSCN MKR DOCD: CPT | Performed by: PSYCHIATRY & NEUROLOGY

## 2019-05-10 PROCEDURE — G8399 PT W/DXA RESULTS DOCUMENT: HCPCS | Performed by: PSYCHIATRY & NEUROLOGY

## 2019-05-10 PROCEDURE — 3017F COLORECTAL CA SCREEN DOC REV: CPT | Performed by: PSYCHIATRY & NEUROLOGY

## 2019-05-10 PROCEDURE — 97110 THERAPEUTIC EXERCISES: CPT

## 2019-05-10 PROCEDURE — 97112 NEUROMUSCULAR REEDUCATION: CPT

## 2019-05-10 PROCEDURE — G8427 DOCREV CUR MEDS BY ELIG CLIN: HCPCS | Performed by: PSYCHIATRY & NEUROLOGY

## 2019-05-10 PROCEDURE — G8598 ASA/ANTIPLAT THER USED: HCPCS | Performed by: PSYCHIATRY & NEUROLOGY

## 2019-05-10 PROCEDURE — 4040F PNEUMOC VAC/ADMIN/RCVD: CPT | Performed by: PSYCHIATRY & NEUROLOGY

## 2019-05-10 RX ORDER — DIVALPROEX SODIUM 250 MG/1
250 TABLET, EXTENDED RELEASE ORAL DAILY
Qty: 30 TABLET | Refills: 3 | Status: SHIPPED | OUTPATIENT
Start: 2019-05-10 | End: 2019-06-06 | Stop reason: ALTCHOICE

## 2019-05-10 NOTE — PATIENT INSTRUCTIONS
1. Start Depakote  mg daily  2. Continue with current medications  3. Follow up in 6 months   4. Call if any questions or concerns.

## 2019-05-10 NOTE — PROGRESS NOTES
[place]     Time In: 6584  Time Out: [address]  Minutes: 40  Timed Code Treatment Minutes: 40 Minutes    Date: 5/10/2019  Patient Name: Drew Saint,  Gender:  female        CSN: [id number]   : 1955  (70 y.o.)       Referring Practitioner: Abby Heath CNP      Diagnosis: Age-related osteoporosis without current pathological fracture M81.0  Treatment Diagnosis: osteoporosis affecting body mechanics and posture   Additional Pertinent Hx: PMH: HTN, kidney stones, anxiety, memory issueus, fibromyalgia, osteoporosis, 2 large stroke 6 years ago. General:  PT Visit Information  Onset Date: 19  PT Insurance Information: Medicare- unlimited visits based on medical necessity and cap, aquatics and modalities covered except ionto, HP/CP  Total # of Visits to Date: 5  Plan of Care/Certification Expiration Date: 19  Progress Note Counter: 5/10 for PN. Subjective:  Chart Reviewed: Yes  Comments: Follow up with Jenni Welch in July. Follow up with Dr. Nafisa Borges for right shoulder pain 19. Other (Comment): Referral for bone fragility program.      Subjective: Pt reports she was sore after therapy last session. Pt stressed from having issues ordering flowers for daughter earlier today. Pt c/o soreness in left leg today. Pain:  Patient Currently in Pain: No         Objective         Exercises  Exercise 5: Nustep S6, A7 L1 x5 min  Exercise 7: Standing on foam: heel/toe raises, slow march, 3 way hip and mini squats x 10 -cues for light BUE support  Exercise 8: Rocker Board 2 directions x 10, balance 30 seconds SBA during balance and UE support with taps  Exercise 10:  Tandem stance on foam x30 seconds bilat -freq hand taps  Exercise 11: From fall book: a real swing #11 x10 B, raise your hand #14 5x with and without alternating, tap dance #18 5x -BUE support in // bars; ankle pump 5 sets of 3 reps with 1 UE  Exercise 12: Hydrostick feet together F/B, S/S x10, circles x5 each         Activity Tolerance:  Activity Tolerance: Patient Tolerated treatment well    Assessment: Body structures, Functions, Activity limitations: Decreased functional mobility , Decreased ADL status, Decreased strength, Decreased balance  Assessment: Continued with balance training, initiating hydrostick and standing ankle pumps, requiring BUE support with all exercises. Pt requires visual and verbal cues to properly coordinate exercises. Pt continues to ambulate without AD- instructed her to use all the time for safety to decrease fall risk, since left foot starts to drag with fatigue. Prognosis: Good       Patient Education:  Patient Education: Continue with HEP- do at least 2x before next session. Use walker. Plan:  Times per week: 2x  Plan weeks: 8 weeks  Current Treatment Recommendations: Strengthening, Balance Training, Gait Training, Manual Therapy - Soft Tissue Mobilization, Home Exercise Program, Patient/Caregiver Education & Training, Modalities, Functional Mobility Training, Transfer Training, ADL/Self-care Training    Goals:  Patient goals : [facility] [address] [facility]  Time Frame for Short term goals: 4 weeks  Short term goal 1: Pt will demonstrate knowledge of ADL guidelines to decrease stress at spine by observation of proper transfers, bed mobility and lifting techniques. Short term goal 2: Pt will demonstate knowledge of exercise guidelines and positions to avoid to decrease stress on spine. Short term goal 3: Pt will perform B step stance and tandem balance x10 sec with minimal sway to improve dynamic balance for gait on various terrain and decreased fall risk. Short term goal 4: Pt will demo good postural awareness with <2 verbal cues during therapy session to carry over to functional mobility and tasks.      Long term goals  Time Frame for Long term goals : 8 weeks  Long term goal 1: Pt will be independent and compliant with HEP to achieve above goals.      Tye Lawson, PT

## 2019-05-13 ENCOUNTER — OFFICE VISIT (OUTPATIENT)
Dept: PSYCHOLOGY | Age: 64
End: 2019-05-13
Payer: MEDICARE

## 2019-05-13 DIAGNOSIS — F33.1 MODERATE EPISODE OF RECURRENT MAJOR DEPRESSIVE DISORDER (HCC): ICD-10-CM

## 2019-05-13 DIAGNOSIS — F41.1 GENERALIZED ANXIETY DISORDER: Primary | Chronic | ICD-10-CM

## 2019-05-13 PROBLEM — F33.9 MAJOR DEPRESSION, RECURRENT (HCC): Status: ACTIVE | Noted: 2019-05-13

## 2019-05-13 PROCEDURE — 90834 PSYTX W PT 45 MINUTES: CPT | Performed by: PSYCHOLOGIST

## 2019-05-13 ASSESSMENT — PATIENT HEALTH QUESTIONNAIRE - PHQ9
7. TROUBLE CONCENTRATING ON THINGS, SUCH AS READING THE NEWSPAPER OR WATCHING TELEVISION: 1
3. TROUBLE FALLING OR STAYING ASLEEP: 0
1. LITTLE INTEREST OR PLEASURE IN DOING THINGS: 0
2. FEELING DOWN, DEPRESSED OR HOPELESS: 2
4. FEELING TIRED OR HAVING LITTLE ENERGY: 3
SUM OF ALL RESPONSES TO PHQ QUESTIONS 1-9: 6
5. POOR APPETITE OR OVEREATING: 0
SUM OF ALL RESPONSES TO PHQ QUESTIONS 1-9: 6
9. THOUGHTS THAT YOU WOULD BE BETTER OFF DEAD, OR OF HURTING YOURSELF: 0
SUM OF ALL RESPONSES TO PHQ9 QUESTIONS 1 & 2: 2
8. MOVING OR SPEAKING SO SLOWLY THAT OTHER PEOPLE COULD HAVE NOTICED. OR THE OPPOSITE, BEING SO FIGETY OR RESTLESS THAT YOU HAVE BEEN MOVING AROUND A LOT MORE THAN USUAL: 0
10. IF YOU CHECKED OFF ANY PROBLEMS, HOW DIFFICULT HAVE THESE PROBLEMS MADE IT FOR YOU TO DO YOUR WORK, TAKE CARE OF THINGS AT HOME, OR GET ALONG WITH OTHER PEOPLE: 1
6. FEELING BAD ABOUT YOURSELF - OR THAT YOU ARE A FAILURE OR HAVE LET YOURSELF OR YOUR FAMILY DOWN: 0

## 2019-05-13 ASSESSMENT — ANXIETY QUESTIONNAIRES
7. FEELING AFRAID AS IF SOMETHING AWFUL MIGHT HAPPEN: 1-SEVERAL DAYS
3. WORRYING TOO MUCH ABOUT DIFFERENT THINGS: 3-NEARLY EVERY DAY
4. TROUBLE RELAXING: 3-NEARLY EVERY DAY
2. NOT BEING ABLE TO STOP OR CONTROL WORRYING: 3-NEARLY EVERY DAY
1. FEELING NERVOUS, ANXIOUS, OR ON EDGE: 3-NEARLY EVERY DAY
6. BECOMING EASILY ANNOYED OR IRRITABLE: 1-SEVERAL DAYS
5. BEING SO RESTLESS THAT IT IS HARD TO SIT STILL: 0-NOT AT ALL SURE
GAD7 TOTAL SCORE: 14

## 2019-05-13 NOTE — PATIENT INSTRUCTIONS
· Start doing the bright light therapy again  · Start listening to the calming recording again  · Keep as active as possible

## 2019-05-13 NOTE — Clinical Note
Hi! I wanted to touch base. Patient has some natural increase in anxiety and depression, but is reluctant to start depakote due to increase in fall risk. Some heightened irritability, but not acting on it. She is willing to restart cognitive and behavioral techniques, and will restart her phototherapy and also her regular relaxation practice. I think that will do it for her.

## 2019-05-14 ENCOUNTER — APPOINTMENT (OUTPATIENT)
Dept: PHYSICAL THERAPY | Age: 64
End: 2019-05-14
Payer: MEDICARE

## 2019-05-14 NOTE — PROGRESS NOTES
[facility]  PHYSICAL THERAPY MISSED TREATMENT NOTE  OUTPATIENT REHABILITATION    Date: 2019  Patient Name: Opal Malik        MRN: [id number]   : 1955  (70 y.o.)  Gender: female   Referring Practitioner: Reta Boyce CNP  Diagnosis: Age-related osteoporosis without current pathological fracture M81.0    PT Visit Information  Canceled Appointment: 1    REASON FOR MISSED TREATMENT:  Cancelled due to illness.       Suresh Burciaga DPT, #[id number]

## 2019-05-15 ENCOUNTER — TELEPHONE (OUTPATIENT)
Dept: REHABILITATION | Age: 64
End: 2019-05-15

## 2019-05-15 RX ORDER — TRAZODONE HYDROCHLORIDE 100 MG/1
TABLET ORAL
Qty: 90 TABLET | Refills: 1 | Status: SHIPPED | OUTPATIENT
Start: 2019-05-15 | End: 2019-06-06 | Stop reason: DRUGHIGH

## 2019-05-15 NOTE — TELEPHONE ENCOUNTER
Patient called, asking if I would talk with Dr. Roni Mason about her not wanting to take the Depakote and perhaps increasing the trazodone. I did call Dr. Roni Mason and passed on the request. Her office will contact Jayro English.

## 2019-05-20 NOTE — PROGRESS NOTES
NEUROLOGY OUT PATIENT FOLLOW UP NOTE: 5/10/47560:32 PM  ?  Twila Davis is here for follow up for history of stroke, headache, memory problem. She feels she is doing worse since last evaluation. She reports when she isn't stressful situations the fluency of her speech becomes worse and people struggle understanding her. She says that her headaches are well controlled other than when she gets worked up and stressed out. She is here with her  to discuss medication options and the plan of care going forward. ?  ROS:  Respiratory : no cough, no shortness of breath  Cardiac: no chest pain. No palpitations. Renal : no flank pain, no hematuria   Skin: no rash  ? Allergies   Allergen Reactions    Actos [Pioglitazone] Nausea And Vomiting    Augmentin [Amoxicillin-Pot Clavulanate] Diarrhea    Ciprofloxacin ? ? ? unsure    Sulfa Antibiotics Rash   ?   Current Outpatient Medications:    lubiprostone (AMITIZA) 24 MCG capsule, Take 24 mcg by mouth daily (with breakfast), Disp: , Rfl:    predniSONE (DELTASONE) 10 MG tablet, 2 tablets 2 times a day for 2 days then 1 Tablet 2 times a day for 2 days, then 1 tablet daily till gone, Disp: 15 tablet, Rfl: 0   denosumab (PROLIA) 60 MG/ML SOLN SC injection, Inject 60 mg into the skin once, Disp: , Rfl:    calcium-vitamin D (OSCAL 500/200 D-3) 500-200 MG-UNIT per tablet, Take 2 tablets by mouth 2 times daily, Disp: 30 tablet, Rfl: 3   amLODIPine (NORVASC) 10 MG tablet, Take 1 tablet by mouth daily, Disp: 90 tablet, Rfl: 1   buPROPion (WELLBUTRIN SR) 150 MG extended release tablet, TAKE 1 TABLET TWICE A DAY, Disp: 180 tablet, Rfl: 1   traZODone (DESYREL) 50 MG tablet, TAKE 1 TABLET NIGHTLY, Disp: 90 tablet, Rfl: 1   clopidogrel (PLAVIX) 75 MG tablet, TAKE 1 TABLET DAILY, Disp: 90 tablet, Rfl: 1   vitamin D (ERGOCALCIFEROL) 16759 units CAPS capsule, Take 1 capsule by mouth once a week, Disp: 12 capsule, Rfl: 1   chlordiazePOXIDE-clidinium (LIBRAX) 5-2.5 MG per stenosis. ? At C6-7, there has been prior fusion. There is no spinal canal or foraminal stenosis. ? At C7-T1, there are no degenerative changes. There is no spinal canal or foraminal stenosis. ? There are no suspicious findings in the cervical soft tissues. ? Impression IMPRESSION:  ?  1. Prior fusion of the C5-C7 levels. 2. Mild degenerative changes at the C3-4 and C4-5 levels without spinal canal or foraminal stenosis. ? Final report electronically signed by Dr. Adrianna Connor on 6/24/2015 10:03 AM   ?  Results for orders placed during the hospital encounter of 04/26/18    [address]   ? Narrative PROCEDURE: CTA HEAD W WO CONTRAST, CTA NECK W WO CONTRAST  ? CLINICAL INFORMATION: STROKE. Additional history obtained from the electronic medical record indicates the patient has forgetfulness, confusion and generalized weakness. ?  COMPARISON: None available. ?  TECHNIQUE: 1 mm axial images were obtained through the head and neck after the fast bolus administration of contrast. A noncontrast localizer was obtained. 3-D reconstructions were performed on a dedicated 3-D workstation. These include multiplanar MPR   images and multiplanar MIP images. Centerline reconstructions were obtained of the carotid systems. 80 mL Isovue-370 intravenous contrast was given. ? All CT scans at this facility use dose modulation, iterative reconstruction, and/or weight-based dosing when appropriate to reduce radiation dose to as low as reasonably achievable. ?  FINDINGS:  ?  There is a common origin of the innominate and left common carotid arteries. Atherosclerotic calcification is noted within the aortic arch. The origins of the great vessels off the arch are patent. The origin of the right common carotid artery is not   well-visualized secondary to streak artifact from adjacent contrast bolus.  The right common carotid artery, its bifurcation and cervical external/internal carotid arteries are within normal limits. ?  The left common carotid artery, its bifurcation and left cervical external/internal carotid arteries are unremarkable. ?  The origin of the right vertebral artery is patent. The right vertebral artery is nondominant. The remainder of the right vertebral artery is patent. The origin of the left vertebral artery is patent. The left vertebral artery is dominant. The remainder   of the left vertebral artery is unremarkable in course and caliber. The vertebral arteries join intracranially to form a normal-appearing basilar artery. ?  Cranially, the petrous, cavernous and clinoid internal carotid arteries are within normal limits. The bilateral MCAs and ACAs are patent with normal arborization of the distal branches. There is a patent anterior communicating artery. Bilateral posterior  communicating arteries are also present. The bilateral ACAs and SCA's are patent. Bilateral patent AICAs and PICAs are also visualized. ? The superior sagittal sinus, vein of Gera, internal cerebral veins, straight sinus, transverse sinuses and sigmoid sinuses are patent. ? Hypoattenuation is noted within the right frontal lobe secondary to a remote infarct. A mucous retention cyst or polyp is again noted within the left maxillary sinus. The visualized temporal bone structures are unremarkable. Postsurgical and multilevel   degenerative changes are present in the cervical spine. No suspicious osseous lesion is identified. No suspicious abnormality is identified within the visualized paraspinal soft tissues. The visualized lung apices are clear. ?   ? Impression 1. No major branch occlusion, flow-limiting stenosis or aneurysm is identified intracranially. ?  2. No occlusion, flow-limiting stenosis, dissection or aneurysm is identified of the major cervical arterial structures. ?  3. There is encephalomalacia within the right frontal lobe corresponding to a remote infarct.   ?  ?  ?  ?  **This report has been created using voice recognition software. It may contain minor errors which are inherent in voice recognition technology. **  ? Final report electronically signed by Dr. Christina Becerra on 4/26/2018 12:24 PM     Results for orders placed during the hospital encounter of 04/26/18   [facility] [address]   ? Narrative PROCEDURE: CTA HEAD W WO CONTRAST, CTA NECK W WO CONTRAST  ? CLINICAL INFORMATION: STROKE. Additional history obtained from the electronic medical record indicates the patient has forgetfulness, confusion and generalized weakness. ?  COMPARISON: None available. ?  TECHNIQUE: 1 mm axial images were obtained through the head and neck after the fast bolus administration of contrast. A noncontrast localizer was obtained. 3-D reconstructions were performed on a dedicated 3-D workstation. These include multiplanar MPR   images and multiplanar MIP images. Centerline reconstructions were obtained of the carotid systems. 80 mL Isovue-370 intravenous contrast was given. ? All CT scans at this facility use dose modulation, iterative reconstruction, and/or weight-based dosing when appropriate to reduce radiation dose to as low as reasonably achievable. ?  FINDINGS:  ?  There is a common origin of the innominate and left common carotid arteries. Atherosclerotic calcification is noted within the aortic arch. The origins of the great vessels off the arch are patent. The origin of the right common carotid artery is not   well-visualized secondary to streak artifact from adjacent contrast bolus. The right common carotid artery, its bifurcation and cervical external/internal carotid arteries are within normal limits. ?  The left common carotid artery, its bifurcation and left cervical external/internal carotid arteries are unremarkable. ?  The origin of the right vertebral artery is patent. The right vertebral artery is nondominant. The remainder of the right vertebral artery is patent.  The origin of the left vertebral artery is patent. The left vertebral artery is dominant. The remainder   of the left vertebral artery is unremarkable in course and caliber. The vertebral arteries join intracranially to form a normal-appearing basilar artery. ?  Cranially, the petrous, cavernous and clinoid internal carotid arteries are within normal limits. The bilateral MCAs and ACAs are patent with normal arborization of the distal branches. There is a patent anterior communicating artery. Bilateral posterior  communicating arteries are also present. The bilateral ACAs and SCA's are patent. Bilateral patent AICAs and PICAs are also visualized. ? The superior sagittal sinus, vein of Gera, internal cerebral veins, straight sinus, transverse sinuses and sigmoid sinuses are patent. ? Hypoattenuation is noted within the right frontal lobe secondary to a remote infarct. A mucous retention cyst or polyp is again noted within the left maxillary sinus. The visualized temporal bone structures are unremarkable. Postsurgical and multilevel   degenerative changes are present in the cervical spine. No suspicious osseous lesion is identified. No suspicious abnormality is identified within the visualized paraspinal soft tissues. The visualized lung apices are clear. ?   ? Impression 1. No major branch occlusion, flow-limiting stenosis or aneurysm is identified intracranially. ?  2. No occlusion, flow-limiting stenosis, dissection or aneurysm is identified of the major cervical arterial structures. ?  3. There is encephalomalacia within the right frontal lobe corresponding to a remote infarct. ?  ?  ?  ?  **This report has been created using voice recognition software. It may contain minor errors which are inherent in voice recognition technology. **  ?   Final report electronically signed by Dr. Fabien Ceja on 4/26/2018 12:24 PM     Results for orders placed during the hospital encounter of 03/22/16   MRI Brain W/O contrast   ? Narrative PROCEDURE: MRI BRAIN WO CONTRAST  ? CLINICAL INFORMATION Changes, multiple strokes in the past.. Headaches. Right arm numbness. Visual changes. ?  COMPARISON: Brain MRI 4/14/2014. .  ? TECHNIQUE: Multiplanar and multiple spin echo MRI images were obtained of the brain without contrast.   ?  FINDINGS:  ?  ? The diffusion-weighted images are normal. The brain volume is mildly reduced. There is a moderate-sized old infarct in the right frontal lobe. There is volume loss and encephalomalacia. This is stable. There is a small old cortical infarct in the right   parietal lobe. No new areas of abnormal signal are noted. ? There are no intra-or extra-axial collections. There is no hydrocephalus, midline shift or mass effect. ? There is mineralization in the medial aspects of the basal ganglia bilaterally. There is evidence of old hemorrhage at the right frontal lobe infarct there is evidence of old hemorrhage in the right frontal lobe infarct. The major intracranial   vascular flow voids are present. ? The midline craniocervical junction structures are normal. The pituitary gland and brainstem are normal.  ? There is no significant change in the appearance of the brain compared to the prior study. .  ? There is a 2 cm mucous retention cyst in the left maxillary sinus. This has increased in size. ? Impression IMPRESSION:  1. No evidence of an acute infarct. 2. Stable appearing old infarcts in the right frontal lobe and right parietal lobe. 3. 2 cm mucous retention cyst versus polyp in the left maxillary sinus. This has increased in size. ?  ?  ?  ?  **This report has been created using voice recognition software. It may contain minor errors which are inherent in voice recognition technology. **  ?   Final report electronically signed by Dr. William Eli on 3/23/2016 8:19 AM   ?  Results for orders placed during the hospital encounter of 04/14/14   MRI Brain W WO Contrast   ? Narrative [facility] [address] Patient Name: Hayde Vaughn  Patient Type: Afsaneh Petit MRN: [id number]  Gender: Female Account Number: [de-identified]  [facility] Phys: Florencia Lechuga. YOB: 1955  M.D. Pt Loc: Outpatient  ? ?  R a d i o l o g y R e p o r t  ? Accession Number: Procedure Name: Exam Date/Time:  [id number] MRI Brain B Stem W/WO Contr 4/14/2014 9:47:45 AM  ?  CPT4 code  52145,   ?  ? 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 ? REPORT  MR BRAIN WITHOUT AND WITH CONTRAST  ? HISTORY: Previous CVA, has memory loss. ?  TECHNIQUE: Multiplanar and multispin echo including pre and post  gadolinium images of the brain were obtained. ?  COMPARISON: 02/02/2013.  ?  FINDINGS: The diffusion weighted images and the apparent diffusion  coefficient maps appear unremarkable without areas of restricted diffusion  seen. ? There is a small left maxillary sinus mucous retention cyst.  ? There is minimal left frontal sinus mucosal thickening. ? There is no evidence of acute intracranial hemorrhage, mass effect, midline  shift or obstructive hydrocephalus. ? There are no abnormal extra-axial fluid collections. ? Chronic appearing areas of encephalomalacia/gliosis of the right frontal  lobe and the right parietal lobe once again noted, unchanged, likely  chronic infarctions. There is also an area of T2 signal in the right  insula to indicate chronic infarction as well. ? There are no foci of abnormal intracranial enhancement seen. ?  IMPRESSION:  ?  ? Page 1 of 2 Print date/time:4/16/2014 4:20 PM  ?  ?  ? [id number] [place] [address] Patient Name: Hayde Vaughn  Patient Type: Afsaneh Petit MRN: [id number]  Gender: Female Account Number: [de-identified]  [facility] Phys: Florencia Lechuga. YOB: 1955  M.D. Pt Loc: Outpatient  ? ?  R a d i o l o g y R e p o r t  ?   Accession Number: Procedure Name: Exam Date/Time:  [id number] MRI Brain B Stem W/WO Contr 4/14/2014 9:47:45 AM  ?  1. Unchanged MR appearance of the brain. 2. Chronic appearing right cerebral infarctions once again noted. *FINAL REPORT*  Dictated By : Oxana Garrett M.D. Certified Electronic Signature  [facility].  Dictated Date and Time: 14-APR-2014 10:30  Transcribed By: Ephraim Szymanski  Transcribed Date and Time: 15-APR-2014 17:22  Approved By: Oxana Garrett M.D. Approve Date and Time: 16-APR-2014 16:19  ? Technologist: Sheila Alan RT(R)(MR)  ?  ?  Page 2 of 2 Print date/time:4/16/2014 4:20 PM   ?reviewed   Results for orders placed during the hospital encounter of 03/27/19   CT Head WO Contrast   ? Narrative PROCEDURE: CT HEAD WO CONTRAST  ? CLINICAL INFORMATION: Weakness, hx CVA. ? COMPARISON: November 29, 2018  ? TECHNIQUE: Noncontrast 5 mm axial images were obtained through the brain. ? All CT scans at this facility use dose modulation, iterative reconstruction, and/or weight-based dosing when appropriate to reduce radiation dose to as low as reasonably achievable. ?  FINDINGS:  ?  There are no fractures. There is redemonstration of encephalomalacia of the right posterior lateral parietal lobe adjacent to the sylvian cistern. There is early developing compensatory ventricular changes. There is no acute hemorrhage or obvious change of large vessel infarct. ? Impression Stable CT of the brain. Negative exam for acute pathology. ?  Old ischemic changes of the right posterior parietal region similar to prior study. ?  ? **This report has been created using voice recognition software. It may contain minor errors which are inherent in voice recognition technology. **  ? Final report electronically signed by Dr. Aimee Lamb on 3/27/2019 10:33 PM   ?  Assessment:  ? Diagnosis Orders   1. Memory problem  ? 2. Tension headache  ? ? She is here for follow up for headache and memory trouble. Her headaches well controlled.  She feels worse with her memory trouble. Her speech is fluent on exam today. She reports she can struggle with her speech in stressful situations. She gives an example of attempting to order flowers via phone and became frustrated as the recipient on the other line cannot understand what she was staying. She had to have her  complete the order for her. We will start her on low-dose Depakote to help with anxiety overlay that could be contributing to her speech fluency in stressful situations as well as headache prevention medication. After detailed discussion with patient we agreed on the following plan. After detailed discussion with patient and , we agreed on the following plan. ?  ?  Plan:  1. Start Depakote  mg daily  2. Continue with current medications  3. Follow up in 6 months   4. Call if any questions or concerns. ?  Time spent evaluating patient, reviewing records/imaging, counseling, was more than 25 min. All patient's questions were answered. Please call if any questions.       Natali Sadler MD

## 2019-05-21 ENCOUNTER — HOSPITAL ENCOUNTER (OUTPATIENT)
Dept: PHYSICAL THERAPY | Age: 64
Setting detail: THERAPIES SERIES
Discharge: HOME | End: 2019-05-21
Payer: MEDICARE

## 2019-05-21 PROCEDURE — 97110 THERAPEUTIC EXERCISES: CPT

## 2019-05-21 ASSESSMENT — PAIN SCALES - GENERAL: PAINLEVEL_OUTOF10: 6

## 2019-05-21 ASSESSMENT — PAIN DESCRIPTION - ORIENTATION: ORIENTATION: RIGHT;LEFT

## 2019-05-21 ASSESSMENT — PAIN DESCRIPTION - LOCATION: LOCATION: FOOT;LEG

## 2019-05-21 NOTE — PROGRESS NOTES
\ [address]     Time In: 3745  Time Out: 7724  Minutes: 35  Timed Code Treatment Minutes: 35 Minutes                Date: 2019  Patient Name: Twila Davis,  Gender:  female        CSN: [id number]   : 1955  (70 y.o.)       Referring Practitioner: Oswaldo Anton CNP      Diagnosis: Age-related osteoporosis without current pathological fracture M81.0  Treatment Diagnosis: osteoporosis affecting body mechanics and posture   Additional Pertinent Hx: PMH: HTN, kidney stones, anxiety, memory issueus, fibromyalgia, osteoporosis, 2 large stroke 6 years ago. General:  PT Visit Information  Onset Date: 19  PT Insurance Information: Medicare- unlimited visits based on medical necessity and cap, aquatics and modalities covered except ionto, HP/CP  Total # of Visits to Date: 6  Plan of Care/Certification Expiration Date: 19  Progress Note Counter: 6/10 for PN. Subjective:  Family / Caregiver Present: No  Comments: Follow up with Jenni Welch in July. Follow up with Dr. Jovita Zarate for right shoulder pain 19. Other (Comment): Referral for bone fragility program.      Subjective: Patient late to session today. Patient reporting that her feet and legs are bothering her today. Patient reporting that her feet are swelling and she usually does not have trouble with this. Patient states she will call MD today about this.      Pain:  Patient Currently in Pain: Yes  Pain Assessment: 0-10  Pain Level: 6  Pain Location: Foot, Leg  Pain Orientation: Right, Left      Objective  Exercises  Exercise 5: Nustep S6, A7 L1 x5 min  Exercise 6: seated with towel roll behind back: LAQ, hip adduction with ball, hip abduction with orange 15 x 5 seconds   Exercise 7: Standing on foam: heel/toe raises, slow march, 3 way hip and mini squats x 10 -cues for light BUE support  Exercise 8: Rocker Board 2 directions x 15, balance 30 seconds SBA during balance and UE support with taps  Exercise 10: Tandem stance on foam x30 seconds bilat -freq hand taps  Exercise 12: Hydrostick feet together F/B x 10, S/S x7 due to pain         Activity Tolerance:  Activity Tolerance: Patient Tolerated treatment well    Assessment:  Assessment: Session limited due to patient being late. Did try to progress reps with patient tolerating well. Patient did have more pain with side to side at hydro stick and needing to cut reps short due to this. Patient reporting back pain 5/10 at end of session but having no other complains. Prognosis: Good       Patient Education:  Patient Education: Continue with HEP and monitor response to progressions. Monitor back pain and use heat as needed. Plan:  Times per week: 2x  Plan weeks: 8 weeks  Specific instructions for Next Treatment: progress balance exericses focusing on posture  Current Treatment Recommendations: Strengthening, Balance Training, Gait Training, Manual Therapy - Soft Tissue Mobilization, Home Exercise Program, Patient/Caregiver Education & Training, Modalities, Functional Mobility Training, Transfer Training, ADL/Self-care Training    Goals:  Patient goals : Return to exercising at [address] [facility] term goals  Time Frame for Short term goals: 4 weeks  Short term goal 1: Pt will demonstrate knowledge of ADL guidelines to decrease stress at spine by observation of proper transfers, bed mobility and lifting techniques. Short term goal 2: Pt will demonstate knowledge of exercise guidelines and positions to avoid to decrease stress on spine. Short term goal 3: Pt will perform B step stance and tandem balance x10 sec with minimal sway to improve dynamic balance for gait on various terrain and decreased fall risk. Short term goal 4: Pt will demo good postural awareness with <2 verbal cues during therapy session to carry over to functional mobility and tasks.

## 2019-05-23 ENCOUNTER — HOSPITAL ENCOUNTER (OUTPATIENT)
Dept: PHYSICAL THERAPY | Age: 64
Setting detail: THERAPIES SERIES
Discharge: HOME | End: 2019-05-23
Payer: MEDICARE

## 2019-05-23 NOTE — PROGRESS NOTES
[facility]  PHYSICAL THERAPY MISSED TREATMENT NOTE  OUTPATIENT REHABILITATION    Date: 2019  Patient Name: Robby Khan        MRN: [id number]   : 1955  (70 y.o.)  Gender: female           PT Visit Information  No Show: 1    REASON FOR MISSED TREATMENT:  Patient no show/no call for appointment today. Called patient and left a message informing her of her missed appointment today. Also informed patient of her next appointment time and date.     [facility] PTA [id number]

## 2019-05-28 ENCOUNTER — HOSPITAL ENCOUNTER (OUTPATIENT)
Dept: PHYSICAL THERAPY | Age: 64
Setting detail: THERAPIES SERIES
Discharge: HOME | End: 2019-05-28
Payer: MEDICARE

## 2019-05-28 PROCEDURE — 97116 GAIT TRAINING THERAPY: CPT

## 2019-05-28 PROCEDURE — 97110 THERAPEUTIC EXERCISES: CPT

## 2019-05-28 NOTE — PROGRESS NOTES
[address] [facility]     Time In: 5938  Time Out: 0471  Minutes: 45  Timed Code Treatment Minutes: 45 Minutes    Date: 2019  Patient Name: Jacky Syed,  Gender:  female        CSN: [id number]   : 1955  (70 y.o.)       Referring Practitioner: Trice Jade CNP      Diagnosis: Age-related osteoporosis without current pathological fracture M81.0  Treatment Diagnosis: osteoporosis affecting body mechanics and posture   Additional Pertinent Hx: PMH: HTN, kidney stones, anxiety, memory issueus, fibromyalgia, osteoporosis, 2 large stroke 6 years ago. General:  PT Visit Information  Onset Date: 19  PT Insurance Information: Medicare- unlimited visits based on medical necessity and cap, aquatics and modalities covered except ionto, HP/CP  Total # of Visits to Date: 7  Plan of Care/Certification Expiration Date: 19  Progress Note Counter: 7/10 for PN. Subjective:  Chart Reviewed: Yes  Family / Caregiver Present: No  Comments: Follow up with [facility] in July. Follow up with Dr. Nallely Posadas for right shoulder pain 19. Other (Comment): Referral for bone fragility program.      Subjective: Pt presents with front wheeled walker that has swivel wheels causing pt difficulty walking straight path. Pt reports feeling off balance the other day, leaning to right, then left, then backward but never fell.       Pain:  Patient Currently in Pain: No         Objective         Exercises  Exercise 5: Nustep S6, A7 L1 x5 min -BP checked first 119/67  Exercise 7: Standing on foam: heel/toe raises, slow march, 3 way hip and mini squats x 10 -cues for light BUE support  Exercise 8: Rocker Board 2 directions x 15, balance 30 seconds SBA during balance and UE support with taps  Exercise 12: Hydrostick feet together F/B x 10, S/S x10 each, circles CW, CCW x5 each -SBA, cues for posture and abd decreased fall risk. Short term goal 4: Pt will demo good postural awareness with <2 verbal cues during therapy session to carry over to functional mobility and tasks. Long term goals  Time Frame for Long term goals : 8 weeks  Long term goal 1: Pt will be independent and compliant with HEP to achieve above goals.      Anthony Hall, PT

## 2019-05-29 ENCOUNTER — OFFICE VISIT (OUTPATIENT)
Dept: PSYCHOLOGY | Age: 64
End: 2019-05-29
Payer: MEDICARE

## 2019-05-29 ENCOUNTER — TELEPHONE (OUTPATIENT)
Dept: NEPHROLOGY | Age: 64
End: 2019-05-29

## 2019-05-29 DIAGNOSIS — F41.1 GENERALIZED ANXIETY DISORDER: Primary | Chronic | ICD-10-CM

## 2019-05-29 DIAGNOSIS — N18.4 CHRONIC KIDNEY DISEASE, STAGE IV (SEVERE) (HCC): Primary | ICD-10-CM

## 2019-05-29 DIAGNOSIS — F33.1 MODERATE EPISODE OF RECURRENT MAJOR DEPRESSIVE DISORDER (HCC): ICD-10-CM

## 2019-05-29 PROCEDURE — 90834 PSYTX W PT 45 MINUTES: CPT | Performed by: PSYCHOLOGIST

## 2019-05-29 NOTE — TELEPHONE ENCOUNTER
Dr. Katie Reardon. I was going threw and confirming appts for next week. Came across [facility]. You last seen her in March. And was to followup in 6 months. It has only been 3 months, not sure why this was scheduled like this. She has had some labs drawn at the beginning of April. Did you want to keep this 3 month follow up? And if so, what labs did you want drawn? No labs were ordered at last visit. Please advise. Thank you.

## 2019-05-29 NOTE — PROGRESS NOTES
[address]  [address] [place] [address]  [address]  Dept: [phone]  Dept Fax: [phone]: [phone]    Patient: Richie Toure  Visit Date: 5/29/2019  Referring Provider:   No ref. provider found    SUBJECTIVE DATA     CHIEF COMPLAINT:    No chief complaint on file. Patient update:  Patient arrived using a walker, again. She had difficulty getting out of the lobby chair. Discussed how Sammy Mcclure is doing well, which is a huge relief. Has noticed that her balance is worse in the past month. Not sure what has chanted. Agrees to start some strengthening and balance work at the [employer]. [place]-setting for today:  · Anxiety about health issues, including balance problems  · Worrying about many medical issues, including osteoporosis. · Feeling down lately, and not sure why. Has been productive. Uses her bright light therapy when it's a dark day. · Wondering whether she is taking her pills correctly. She brought in all her pills and asked me to help her go through them and review her meds. We did that and identified two medications that are on her list, but which weren't in her medication bag. I wrote her a note for her , who manages her pills for her, asking to clarify. · I also recommended that she make an appointment with her pharmacist to look at her extensive med list and make recommendations for what might need to be reviewed. · Whether she needs to go to the [facility] to get exercise--she wants to do this, and knows it would be best for her. OBJECTIVE DATA     Physical Exam:    Mental Status Evaluation:   Orientation: Alert, oriented. Mood:. Anxious, somewhat dysphoric      Affect:  Sad, anxious. Appearance:  Normal   Activity:  Normal   Gait/Posture: Slow, small shuffling steps, using a walker   Speech:  Normal, talkative   Thought Process:  within normal limits   Thought Content:   Within Normal Limits   Cognition:  Normal   Memory: Normal   Insight:  good   Judgment: fair and Normal   Suicidal Ideations: Denies Suicidal Ideations   Homicidal Ideations: Denies Homicidal Ideations   Medication Side Effects: absent       Attention Span Normal     Screenings Completed in This Encounter:                                      DIAGNOSIS AND ASSESSMENT DATA     DIAGNOSIS:  History of depression and anxiety, old CVA    PLAN   Follow-up:  No follow-ups on file. Homework assignment before next session:     [] Practice deep breathing 1-2 times per day for 15 minutes, as demonstrated in session, and/or:    [] Go to [employer]\" web site and begin practicing with their free meditation podcasts    [x] Track thoughts that you think feed anxiety or depression    [] Go to [employer]\" web site, open up the \"Workbooks\" tab, and select a problem from the list that best suits your needs. Review the first module. [x] Begin to track physical activity. Even five minutes per day will be helpful.    [] Talk with your physician about whether it's OK to start fish oil (burpless) or flax oil, 1000 to 1200 mg per day    [] Most importantly, remember this guideline: \"Don't believe everything you think! \"   :)    [] Try \"Expressive Writing\" using the guidelines on the handout    [x] Start yoga class, as discussed. 1. Re-start at [facility], when feasible for you. Let the trainers tailor a program to your needs. Patient identified this as a positive benefit. 2. Continue enjoying time with friends on a regular basis. 3. Assert yourself when it comes to Maxcine Pepper and the finances. 4. Practice daily relaxation training, again. 5. Start journaling  6. Re-start yoga for multiple physical issues. 7. Continue to focus on improving quality of life, for now  8. Keep your phone with you at all times, at least for the next few weeks.   9. Do the therapies in order, and be OK with taking them one at a time. 10. Recognize your strengths, which include facilitating connections between other people. 11. Re-start bright light therapy  12. Re-start depression support group attendance       Session lasted 42 minutes.     Provider Signature:  Electronically signed by Kathy Hammond, PhD on 5/29/2019 at 2:01 PM

## 2019-05-29 NOTE — TELEPHONE ENCOUNTER
Ok you can post pone to another 3 months.  Make sure she has a BMP and UPCR and UMCR done at that time

## 2019-05-30 ENCOUNTER — TELEPHONE (OUTPATIENT)
Dept: NEPHROLOGY | Age: 64
End: 2019-05-30

## 2019-05-30 ENCOUNTER — HOSPITAL ENCOUNTER (OUTPATIENT)
Dept: PHYSICAL THERAPY | Age: 64
Setting detail: THERAPIES SERIES
Discharge: HOME | End: 2019-05-30
Payer: MEDICARE

## 2019-05-30 PROCEDURE — 97112 NEUROMUSCULAR REEDUCATION: CPT

## 2019-05-30 PROCEDURE — 97110 THERAPEUTIC EXERCISES: CPT

## 2019-05-30 ASSESSMENT — PAIN SCALES - GENERAL: PAINLEVEL_OUTOF10: 5

## 2019-05-30 ASSESSMENT — PAIN DESCRIPTION - LOCATION: LOCATION: ANKLE

## 2019-05-30 ASSESSMENT — PAIN DESCRIPTION - ORIENTATION: ORIENTATION: RIGHT;LEFT

## 2019-05-30 NOTE — TELEPHONE ENCOUNTER
Attempted to contact patient. She is scheduled for 6/06. Appt needs scheduled out for September. This would make it a 6 month follow up. Labs mailed to home address to be drawn prior to appointment.

## 2019-06-04 ENCOUNTER — HOSPITAL ENCOUNTER (OUTPATIENT)
Dept: PHYSICAL THERAPY | Age: 64
Setting detail: THERAPIES SERIES
Discharge: HOME | End: 2019-06-04
Payer: MEDICARE

## 2019-06-04 PROCEDURE — 97110 THERAPEUTIC EXERCISES: CPT

## 2019-06-04 ASSESSMENT — PAIN DESCRIPTION - ORIENTATION: ORIENTATION: LOWER

## 2019-06-04 ASSESSMENT — PAIN SCALES - GENERAL: PAINLEVEL_OUTOF10: 7

## 2019-06-04 ASSESSMENT — PAIN DESCRIPTION - LOCATION: LOCATION: BACK

## 2019-06-04 NOTE — PROGRESS NOTES
3x15 sec hold  Exercise 14: Gait training in clinic- cues for heel strike which slows speed but safer  Exercise 15: Toe taps onto mushrooms x10, CGA, no UEs         Activity Tolerance:  Activity Tolerance: Patient Tolerated treatment well    Assessment:  Assessment: Patient tolerating session well but slow moving with exercises and gait. Needing multiple cues to pick feet up while walking. Patient reporting back feeling a little more sore at end of session but having no other complains. Patient Education:  Patient Education: Continue with HEP and picking up feet during gait. Plan:  Times per week: 2x  Plan weeks: 8 weeks  Specific instructions for Next Treatment: progress balance exericses focusing on posture  Current Treatment Recommendations: Strengthening, Balance Training, Gait Training, Manual Therapy - Soft Tissue Mobilization, Home Exercise Program, Patient/Caregiver Education & Training, Modalities, Functional Mobility Training, Transfer Training, ADL/Self-care Training    Goals:  Patient goals : [facility] [address] [facility] goals  Time Frame for Short term goals: 4 weeks  Short term goal 1: Pt will demonstrate knowledge of ADL guidelines to decrease stress at spine by observation of proper transfers, bed mobility and lifting techniques. Short term goal 2: Pt will demonstate knowledge of exercise guidelines and positions to avoid to decrease stress on spine. Short term goal 3: Pt will perform B step stance and tandem balance x10 sec with minimal sway to improve dynamic balance for gait on various terrain and decreased fall risk. Short term goal 4: Pt will demo good postural awareness with <2 verbal cues during therapy session to carry over to functional mobility and tasks. Long term goals  Time Frame for Long term goals : 8 weeks  Long term goal 1: Pt will be independent and compliant with HEP to achieve above goals.      Alexandrea Gonzalez, [id number]

## 2019-06-06 ENCOUNTER — APPOINTMENT (OUTPATIENT)
Dept: PHYSICAL THERAPY | Age: 64
End: 2019-06-06
Payer: MEDICARE

## 2019-06-06 ENCOUNTER — OFFICE VISIT (OUTPATIENT)
Dept: FAMILY MEDICINE CLINIC | Age: 64
End: 2019-06-06
Payer: MEDICARE

## 2019-06-06 VITALS
SYSTOLIC BLOOD PRESSURE: 116 MMHG | HEIGHT: 64 IN | BODY MASS INDEX: 23.39 KG/M2 | DIASTOLIC BLOOD PRESSURE: 60 MMHG | HEART RATE: 64 BPM | WEIGHT: 137 LBS

## 2019-06-06 DIAGNOSIS — E55.9 VITAMIN D DEFICIENCY: ICD-10-CM

## 2019-06-06 DIAGNOSIS — Z00.00 WELCOME TO MEDICARE PREVENTIVE VISIT: Primary | ICD-10-CM

## 2019-06-06 DIAGNOSIS — E78.00 PURE HYPERCHOLESTEROLEMIA: ICD-10-CM

## 2019-06-06 DIAGNOSIS — K21.9 GASTROESOPHAGEAL REFLUX DISEASE WITHOUT ESOPHAGITIS: ICD-10-CM

## 2019-06-06 DIAGNOSIS — I63.00 CEREBRAL INFARCTION DUE TO THROMBOSIS OF PRECEREBRAL ARTERY (HCC): Chronic | ICD-10-CM

## 2019-06-06 DIAGNOSIS — F41.9 ANXIETY DISORDER, UNSPECIFIED TYPE: ICD-10-CM

## 2019-06-06 DIAGNOSIS — K58.9 IRRITABLE BOWEL SYNDROME WITHOUT DIARRHEA: ICD-10-CM

## 2019-06-06 PROCEDURE — G0402 INITIAL PREVENTIVE EXAM: HCPCS | Performed by: FAMILY MEDICINE

## 2019-06-06 PROCEDURE — 4040F PNEUMOC VAC/ADMIN/RCVD: CPT | Performed by: FAMILY MEDICINE

## 2019-06-06 PROCEDURE — 3017F COLORECTAL CA SCREEN DOC REV: CPT | Performed by: FAMILY MEDICINE

## 2019-06-06 PROCEDURE — G8598 ASA/ANTIPLAT THER USED: HCPCS | Performed by: FAMILY MEDICINE

## 2019-06-06 PROCEDURE — 1123F ACP DISCUSS/DSCN MKR DOCD: CPT | Performed by: FAMILY MEDICINE

## 2019-06-06 PROCEDURE — 99213 OFFICE O/P EST LOW 20 MIN: CPT | Performed by: FAMILY MEDICINE

## 2019-06-06 RX ORDER — TRAZODONE HYDROCHLORIDE 50 MG/1
TABLET ORAL
Qty: 90 TABLET | Refills: 0 | Status: SHIPPED
Start: 2019-06-06 | End: 2019-09-09 | Stop reason: SDUPTHER

## 2019-06-06 RX ORDER — CLOPIDOGREL BISULFATE 75 MG/1
TABLET ORAL
Qty: 90 TABLET | Refills: 1 | Status: SHIPPED | OUTPATIENT
Start: 2019-06-06 | End: 2020-02-05 | Stop reason: SDUPTHER

## 2019-06-06 RX ORDER — BUPROPION HYDROCHLORIDE 150 MG/1
TABLET, EXTENDED RELEASE ORAL
Qty: 180 TABLET | Refills: 1 | Status: SHIPPED | OUTPATIENT
Start: 2019-06-06 | End: 2020-02-05 | Stop reason: SDUPTHER

## 2019-06-06 RX ORDER — AMLODIPINE BESYLATE 10 MG/1
10 TABLET ORAL DAILY
Qty: 90 TABLET | Refills: 1 | Status: SHIPPED | OUTPATIENT
Start: 2019-06-06 | End: 2019-08-12 | Stop reason: SDUPTHER

## 2019-06-06 RX ORDER — OMEPRAZOLE 20 MG/1
CAPSULE, DELAYED RELEASE ORAL
Qty: 90 CAPSULE | Refills: 1 | Status: CANCELLED | OUTPATIENT
Start: 2019-06-06

## 2019-06-06 RX ORDER — SIMVASTATIN 20 MG
TABLET ORAL
Qty: 90 TABLET | Refills: 1 | Status: SHIPPED | OUTPATIENT
Start: 2019-06-06 | End: 2020-02-05 | Stop reason: SDUPTHER

## 2019-06-06 RX ORDER — CHLORDIAZEPOXIDE HYDROCHLORIDE AND CLIDINIUM BROMIDE 5; 2.5 MG/1; MG/1
1 CAPSULE ORAL DAILY
Qty: 30 CAPSULE | Refills: 2 | Status: SHIPPED | OUTPATIENT
Start: 2019-06-06 | End: 2019-12-03 | Stop reason: SDUPTHER

## 2019-06-06 RX ORDER — ERGOCALCIFEROL 1.25 MG/1
50000 CAPSULE ORAL WEEKLY
Qty: 12 CAPSULE | Refills: 1 | Status: SHIPPED | OUTPATIENT
Start: 2019-06-06 | End: 2020-02-05 | Stop reason: SDUPTHER

## 2019-06-06 RX ORDER — RANITIDINE 150 MG/1
150 TABLET ORAL 2 TIMES DAILY
Qty: 180 TABLET | Refills: 1 | Status: SHIPPED | OUTPATIENT
Start: 2019-06-06 | End: 2019-12-30

## 2019-06-06 ASSESSMENT — ENCOUNTER SYMPTOMS
BACK PAIN: 1
EYE DISCHARGE: 0
VOMITING: 0
EYE REDNESS: 0
COUGH: 0
SORE THROAT: 0
RHINORRHEA: 0
ABDOMINAL PAIN: 1
NAUSEA: 0
SHORTNESS OF BREATH: 0

## 2019-06-06 ASSESSMENT — PATIENT HEALTH QUESTIONNAIRE - PHQ9
SUM OF ALL RESPONSES TO PHQ QUESTIONS 1-9: 0
SUM OF ALL RESPONSES TO PHQ QUESTIONS 1-9: 0

## 2019-06-06 ASSESSMENT — ANXIETY QUESTIONNAIRES: GAD7 TOTAL SCORE: 0

## 2019-06-06 ASSESSMENT — LIFESTYLE VARIABLES: HOW OFTEN DO YOU HAVE A DRINK CONTAINING ALCOHOL: 0

## 2019-06-06 NOTE — PROGRESS NOTES
[address] [place] [address] Rd, [address], [place]  Phone:  [phone]  QCU:[phone]       Name: Drew Saint  : 1955    Chief Complaint   Patient presents with    Medicare AWV     went to pharmacist and reviewed medication and wanting to stop some     Medication Check       HPI:     HPI  Drew Saint is a 70 y.o. female who presents today to discuss her medications. She feels like she's on too many so spoke to her pharmacist who agreed she doesn't need them all. She was told she can likely go off of her Omeprazole, Zantac, Linzess, and Amitiza as she's been doing well. She's been following with [facility] and Dr. Scott Sherman in GI for her abdominal issues as she's been seen here multiple times for abdominal pain and constipation. She states that she's having BMs about once per week with the Amitiza, but she's only taking it weekly instead of daily. She's also concerned about her memory as she has short-term memory loss. She saw Dr. George Dominguez a few weeks ago and he tested her memory and started her on Depakote for anxiety, but she isn't taking it as she felt it made her too sleepy. She has been taking Trazodone 100 mg nightly instead of 50 mg nightly, but she doesn't think this is why she's fatigued. Current Outpatient Medications:     simvastatin (ZOCOR) 20 MG tablet, TAKE 1 TABLET DAILY, Disp: 90 tablet, Rfl: 1    ranitidine (ZANTAC) 150 MG tablet, Take 1 tablet by mouth 2 times daily, Disp: 180 tablet, Rfl: 1    chlordiazePOXIDE-clidinium (LIBRAX) 5-2.5 MG per capsule, Take 1 capsule by mouth daily.  TAKE 1 CAPSULE TWICE A DAY AS NEEDED FOR PAIN, Disp: 30 capsule, Rfl: 2    vitamin D (ERGOCALCIFEROL) 00747 units CAPS capsule, Take 1 capsule by mouth once a week, Disp: 12 capsule, Rfl: 1    clopidogrel (PLAVIX) 75 MG tablet, TAKE 1 TABLET DAILY, Disp: 90 tablet, Rfl: 1    buPROPion (WELLBUTRIN SR) 150 MG extended release tablet, TAKE 1 TABLET TWICE A DAY, Disp: 180 tablet, Rfl: 1    amLODIPine (NORVASC) 10 MG tablet, Take 1 tablet by mouth daily, Disp: 90 tablet, Rfl: 1    traZODone (DESYREL) 50 MG tablet, TAKE 1 TABLET NIGHTLY, Disp: 90 tablet, Rfl: 0    lubiprostone (AMITIZA) 24 MCG capsule, Take 24 mcg by mouth daily (with breakfast), Disp: , Rfl:     denosumab (PROLIA) 60 MG/ML SOLN SC injection, Inject 60 mg into the skin once, Disp: , Rfl:     calcium-vitamin D (OSCAL 500/200 D-3) 500-200 MG-UNIT per tablet, Take 2 tablets by mouth 2 times daily, Disp: 30 tablet, Rfl: 3    omeprazole (PRILOSEC) 20 MG delayed release capsule, TK 1 C PO QD, Disp: 90 capsule, Rfl: 1    linaclotide (LINZESS) 145 MCG capsule, Take 1 capsule by mouth daily as needed (constipation), Disp: 30 capsule, Rfl: 2    folic acid (FOLVITE) 710 MCG tablet, Take 400 mcg by mouth daily, Disp: , Rfl:     loratadine (CLARITIN) 10 MG tablet, Take 1 tablet by mouth daily, Disp: 30 tablet, Rfl: 2    b complex vitamins capsule, Take 1 capsule by mouth daily, Disp: 100 capsule, Rfl: 3    Probiotic Product (PROBIOTIC DAILY PO), Take  by mouth., Disp: , Rfl:     FISH OIL, 1,000 mg by Does not apply route 2 times daily Indications: Decreased Energy , Disp: , Rfl:     aspirin 81 MG tablet, Take 81 mg by mouth daily. , Disp: , Rfl:     Allergies   Allergen Reactions    Actos [Pioglitazone] Nausea And Vomiting    Augmentin [Amoxicillin-Pot Clavulanate] Diarrhea    Ciprofloxacin      unsure    Sulfa Antibiotics Rash       Subjective:      Review of Systems   Constitutional: Positive for fatigue. Negative for chills and fever. HENT: Negative for rhinorrhea and sore throat. Eyes: Negative for discharge, redness and visual disturbance. Respiratory: Negative for cough and shortness of breath. Cardiovascular: Negative for chest pain and palpitations. Gastrointestinal: Positive for abdominal pain. Negative for nausea and vomiting.    Genitourinary: Negative for dysuria and frequency. Musculoskeletal: Positive for arthralgias, back pain and myalgias. Neurological: Negative for weakness and headaches. Psychiatric/Behavioral: Negative for decreased concentration and dysphoric mood. The patient is nervous/anxious. Objective:     /60 (Site: Left Upper Arm, Position: Sitting, Cuff Size: Large Adult)   Pulse 64   Ht 5' 4\" (1.626 m)   Wt 137 lb (62.1 kg)   BMI 23.52 kg/m²     Physical Exam   Constitutional: She is oriented to person, place, and time. She appears well-developed and well-nourished. No distress. HENT:   Head: Normocephalic and atraumatic. Nose: Nose normal.   Eyes: Conjunctivae and EOM are normal.   Neck: Normal range of motion. Neck supple. Cardiovascular: Normal rate and regular rhythm. Pulmonary/Chest: Effort normal and breath sounds normal. No respiratory distress. She has no wheezes. Abdominal: Soft. Bowel sounds are normal. She exhibits no distension. There is no tenderness. Neurological: She is alert and oriented to person, place, and time. Skin: Skin is warm and dry. No rash noted. No erythema. Nursing note and vitals reviewed. Assessment/Plan: Abhinav Rainey was seen today for medicare awv and medication check. Diagnoses and all orders for this visit:    Gastroesophageal reflux disease without esophagitis  -     Will trial holding Omeprazole, but she was advised to go back on it if her reflux worsens. -     ranitidine (ZANTAC) 150 MG tablet; Take 1 tablet by mouth 2 times daily    Irritable bowel syndrome without diarrhea  -     Continue with Amitiza per GI's recommendations. -     chlordiazePOXIDE-clidinium (LIBRAX) 5-2.5 MG per capsule; Take 1 capsule by mouth daily. TAKE 1 CAPSULE TWICE A DAY AS NEEDED FOR PAIN    Pure hypercholesterolemia  -     A healthy diet and routine physical activity advised.   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 -     amLODIPine (NORVASC) 10 MG tablet; Take 1 tablet by mouth daily      Return in about 3 months (around 9/6/2019) for memory.     Electronically signed by Alexandra Maria MD on 6/6/2019 at 10:12 AM

## 2019-06-06 NOTE — PATIENT INSTRUCTIONS
OK to trial off Omeprazole and Linzess. Personalized Preventive Plan for Kim Nichols - 6/6/2019  Medicare offers a range of preventive health benefits. Some of the tests and screenings are paid in full while other may be subject to a deductible, co-insurance, and/or copay. Some of these benefits include a comprehensive review of your medical history including lifestyle, illnesses that may run in your family, and various assessments and screenings as appropriate. After reviewing your medical record and screening and assessments performed today your provider may have ordered immunizations, labs, imaging, and/or referrals for you. A list of these orders (if applicable) as well as your Preventive Care list are included within your After Visit Summary for your review. Other Preventive Recommendations:    · A preventive eye exam performed by an eye specialist is recommended every 1-2 years to screen for glaucoma; cataracts, macular degeneration, and other eye disorders. · A preventive dental visit is recommended every 6 months. · Try to get at least 150 minutes of exercise per week or 10,000 steps per day on a pedometer . · Order or download the FREE \"Exercise & Physical Activity: Your Everyday Guide\" from [employer]. Call 3-756.928.4794 or search [employer] online. · You need 4685-2351 mg of calcium and 8661-4083 IU of vitamin D per day. It is possible to meet your calcium requirement with diet alone, but a vitamin D supplement is usually necessary to meet this goal.  · When exposed to the sun, use a sunscreen that protects against both UVA and UVB radiation with an SPF of 30 or greater. Reapply every 2 to 3 hours or after sweating, drying off with a towel, or swimming. · Always wear a seat belt when traveling in a car. Always wear a helmet when riding a bicycle or motorcycle.

## 2019-06-06 NOTE — PROGRESS NOTES
[address] [place] [address] Rd, [address], [place]  Phone:  [phone]  ARV:[phone]        Medicare Annual Wellness Visit    Name: Philomena Kraft Date: 2019   MRN: [id number] Sex: Female   Age: 70 y.o. Ethnicity: Non-/Non    : 1955 Race: Marcel Howard is here for Medicare AWV (went to pharmacist and reviewed medication and wanting to stop some ) and Medication Check    Screenings for behavioral, psychosocial and functional/safety risks, and cognitive dysfunction are all negative except as indicated below. These results, as well as other patient data from the [address] form, are documented in Flowsheets linked to this Encounter. Allergies   Allergen Reactions    Actos [Pioglitazone] Nausea And Vomiting    Augmentin [Amoxicillin-Pot Clavulanate] Diarrhea    Ciprofloxacin      unsure    Sulfa Antibiotics Rash     Prior to Visit Medications    Medication Sig Taking?  Authorizing Provider   traZODone (DESYREL) 100 MG tablet TAKE 1 TABLET NIGHTLY  Patient taking differently: 100 mg nightly TAKE 1 TABLET NIGHTLY Yes Hua Almendarez MD   lubiprostone (AMITIZA) 24 MCG capsule Take 24 mcg by mouth daily (with breakfast) Yes Historical Provider, MD   denosumab (PROLIA) 60 MG/ML SOLN SC injection Inject 60 mg into the skin once Yes Historical Provider, MD   calcium-vitamin D (OSCAL 500/200 D-3) 500-200 MG-UNIT per tablet Take 2 tablets by mouth 2 times daily Yes Haleigh Beckham MD   amLODIPine (NORVASC) 10 MG tablet Take 1 tablet by mouth daily Yes Haleigh Beckham MD   buPROPion (WELLBUTRIN SR) 150 MG extended release tablet TAKE 1 TABLET TWICE A DAY Yes Hua Almendarez MD   clopidogrel (PLAVIX) 75 MG tablet TAKE 1 TABLET DAILY Yes Hua Almendarez MD   vitamin D (ERGOCALCIFEROL) 07901 units CAPS capsule Take 1 capsule by mouth once a week Yes Hua Almendarez MD   chlordiazePOXIDE-clidinium (LIBRAX) 5-2.5 MG per capsule TAKE 1 CAPSULE TWICE A DAY AS NEEDED FOR PAIN. Patient taking differently: Take 1 capsule by mouth daily. TAKE 1 CAPSULE TWICE A DAY AS NEEDED FOR PAIN Yes Leonila Page MD   ranitidine (ZANTAC) 150 MG tablet Take 1 tablet by mouth 2 times daily Yes Leonila Page MD   omeprazole (PRILOSEC) 20 MG delayed release capsule TK 1 C PO QD Yes Leonila Page MD   simvastatin (ZOCOR) 20 MG tablet TAKE 1 TABLET DAILY Yes Leonila Page MD   linaclotide (LINZESS) 145 MCG capsule Take 1 capsule by mouth daily as needed (constipation) Yes Leonila Page MD   folic acid (FOLVITE) 609 MCG tablet Take 400 mcg by mouth daily Yes Historical Provider, MD   loratadine (CLARITIN) 10 MG tablet Take 1 tablet by mouth daily Yes Pool Hoskins MD   b complex vitamins capsule Take 1 capsule by mouth daily Yes Stella Bland MD   Probiotic Product (PROBIOTIC DAILY PO) Take  by mouth. Yes Historical Provider, MD   FISH OIL 1,000 mg by Does not apply route 2 times daily Indications: Decreased Energy  Yes Historical Provider, MD   aspirin 81 MG tablet Take 81 mg by mouth daily.    Yes Historical Provider, MD     Past Medical History:   Diagnosis Date    Allergic rhinitis     Anxiety     CKD stage 4 due to type 2 diabetes mellitus ([facility] [address]     CVA (cerebral infarction)     Depression     Fibromyalgia     Headache(784.0)     Hyperlipidemia     Hypertension     Irritable bowel syndrome     Type II or unspecified type diabetes mellitus without mention of complication, not stated as uncontrolled     Unspecified cerebral artery occlusion with cerebral infarction     Unspecified sleep apnea      Past Surgical History:   Procedure Laterality Date    CARPAL TUNNEL RELEASE Right     COLONOSCOPY  2012    [facility]    ENDOSCOPY, COLON, DIAGNOSTIC  unsure    EYE SURGERY      lasik    HEMORRHOID SURGERY  unsure    HYSTERECTOMY      total    NECK SURGERY  18  years ago    fusion    SINUS SURGERY  10 years ago  TUBAL LIGATION       Family History   Problem Relation Age of Onset    Diabetes Mother     High Blood Pressure Mother     Heart Disease Mother     Heart Disease Father    [facility] Parkinsonism Father     Neurofibromatosis Father     Depression Father     Neurofibromatosis Sister     Neurofibromatosis Brother     Other Brother         multiple sclerosis    Dementia Brother     Diabetes Sister     High Blood Pressure Sister     Depression Sister        CareTeam (Including outside providers/suppliers regularly involved in providing care):   Patient Care Team:  Kiran Ellsworth MD as PCP - General (Family Medicine)  Kiran Ellsworth MD as PCP - [facility] Empaneled Provider  Kang Garcia, PhD (Psychology)    Wt Readings from Last 3 Encounters:   06/06/19 137 lb (62.1 kg)   05/10/19 130 lb (59 kg)   03/27/19 130 lb (59 kg)     Vitals:    06/06/19 0903   BP: 116/60   Site: Left Upper Arm   Position: Sitting   Cuff Size: Large Adult   Pulse: 64   Weight: 137 lb (62.1 kg)   Height: 5' 4\" (1.626 m)     Body mass index is 23.52 kg/m². Based upon direct observation of the patient, evaluation of cognition reveals recent and remote memory intact. Patient's complete Health Risk Assessment and screening values have been reviewed and are found in Flowsheets. The following problems were reviewed today and where indicated follow up appointments were made and/or referrals ordered. Positive Risk Factor Screenings with Interventions:     General Health:  General  In general, how would you say your health is?: Good  In the past 7 days, have you experienced any of the following?  New or Increased Pain, New or Increased Fatigue, Loneliness, Social Isolation, Stress or Anger?: (!) New or Increased Pain  Do you get the social and emotional support that you need?: Yes  Do you have a Living Will?: Yes  General Health Risk Interventions:  · Pain issues: patient is in PT for back pain    Personalized Preventive Plan Current Health Maintenance Status  Immunization History   Administered Date(s) Administered    Influenza, Quadv, 3 Years and older, IM (Fluzone 3 yrs and older or Afluria 5 yrs and older) 12/28/2016, 11/17/2017    Influenza, [place]ly [place], 6 mo and older, IM (Flulaval) 10/02/2018    Pneumococcal 13-valent Conjugate (Myrla Jeovany) 01/15/2019    Tdap (Boostrix, Adacel) 08/12/2018        Health Maintenance   Topic Date Due    Hepatitis C screen  1955    HIV screen  10/11/1968    Hepatitis B Vaccine (1 of 3 - Risk 3-dose series) 10/11/1972    Shingles Vaccine (1 of 2) 10/11/2003    Diabetic foot exam  10/19/2016    Diabetic retinal exam  12/27/2017    Colon Cancer Screen FIT/FOBT  06/27/2018    A1C test (Diabetic or Prediabetic)  01/15/2020    Lipid screen  01/15/2020    Pneumococcal 65+ years Vaccine (2 of 2 - PPSV23) 01/15/2020    Potassium monitoring  04/17/2020    Creatinine monitoring  04/17/2020    Breast cancer screen  01/17/2021    DTaP/Tdap/Td vaccine (2 - Td) 08/12/2028    DEXA (modify frequency per FRAX score)  Completed    Flu vaccine  Completed    HPV vaccine  Aged Out     Recommendations for [place] Due: see orders and patient instructions/AVS.  . Recommended screening schedule for the next 5-10 years is provided to the patient in written form: see Patient Instructions/AVS.      Electronically signed by Dante Cardenas MD on 6/6/19.

## 2019-06-07 ENCOUNTER — HOSPITAL ENCOUNTER (OUTPATIENT)
Dept: PHYSICAL THERAPY | Age: 64
Setting detail: THERAPIES SERIES
Discharge: HOME | End: 2019-06-07
Payer: MEDICARE

## 2019-06-07 PROCEDURE — 97164 PT RE-EVAL EST PLAN CARE: CPT

## 2019-06-07 PROCEDURE — 97110 THERAPEUTIC EXERCISES: CPT

## 2019-06-07 ASSESSMENT — PAIN DESCRIPTION - LOCATION: LOCATION: BACK;NECK

## 2019-06-07 NOTE — PROGRESS NOTES
** PLEASE SIGN, DATE AND TIME CERTIFICATION BELOW AND RETURN TO [facility] OUTPATIENT REHABILITATION (FAX #: [phone]). ATTEST/CO-SIGN IF ACCESSING VIA [email or web address]. THANK YOU.**    I certify that I have examined the patient below and determined that Physical Medicine and Rehabilitation service is necessary and that I approve the established plan of care for up to 90 days or as specifically noted. Attestation, signature or co-signature of physician indicates approval of certification requirements.    ________________________ ____________ __________  Physician Signature   Date   Time     [address]     Time In: 0845  Time Out: 7251  Minutes: 38  Timed Code Treatment Minutes: 38 Minutes    Date: 2019  Patient Name: Gunner Clemons,  Gender:  female        CSN: [id number]   : 1955  (70 y.o.)       Referring Practitioner: Mayda Olivier CNP      Diagnosis: Age-related osteoporosis without current pathological fracture M81.0  Treatment Diagnosis: osteoporosis affecting body mechanics and posture   Additional Pertinent Hx: PMH: HTN, kidney stones, anxiety, memory issueus, fibromyalgia, osteoporosis, 2 large stroke 6 years ago. General:  PT Visit Information  Onset Date: 19  PT Insurance Information: Medicare- unlimited visits based on medical necessity and cap, aquatics and modalities covered except ionto, HP/CP  Total # of Visits to Date: 10  Plan of Care/Certification Expiration Date: 19  Progress Note Counter: 0/10 for PN. PN completed 19 on visit 10. Subjective:  Chart Reviewed: Yes  Family / Caregiver Present: No  Comments: Follow up with [facility], [address] [place] [address] 19. Other (Comment): Referral for bone fragility program.      Subjective: Pt ambulates into clinic with rollator, slow pace, focusing on heel strike. Pt still feels off balance, especially on steps. Pt saw doctor yesterday and reports memory is getting worse. Pain:  Patient Currently in Pain: Yes(no rating given)  Pain Location: Back, Neck      Objective         Exercises  Exercise 1: Reviewed log roll technique with bed mobility- pt able to demo but c/o shoulder pain. Exercise 5: Nustep S6, A7 L2 x5 min  Exercise 7: Standing on foam: heel/toe raises, slow march, 3 way hip and mini squats x 10 -1 UE support  Exercise 8: Rocker Board 2 directions x 15, balance 30 seconds SBA during balance with 1 UE support  Exercise 15: Toe taps onto mushrooms x10, CGA, no UEs  Exercise 16: Step ups 4\" forward, lateral B x10 each         Activity Tolerance:  Activity Tolerance: Patient Tolerated treatment well    Assessment: Body structures, Functions, Activity limitations: Decreased functional mobility , Decreased ADL status, Decreased strength, Decreased balance  Assessment: Re-evaluation completed and pt demos good progress toward goals. Pt met balance goal but revised goal. Pt continues to requires cues for posture and body mechanics to decrease stress on spine. Pt will benefit from continued PT to address balance, gait and body mechanics to decrease fall risk. Prognosis: Good       Patient Education:  Patient Education: Continue with HEP. Focus on posture and gait. Plan:  Times per week: 2x  Plan weeks: 8 weeks  Current Treatment Recommendations: Strengthening, Balance Training, Gait Training, Manual Therapy - Soft Tissue Mobilization, Home Exercise Program, Patient/Caregiver Education & Training, Modalities, Functional Mobility Training, Transfer Training, ADL/Self-care Training  Comment: New goals set to further progress mobility and safety.      Goals:  Patient goals : [facility] [address] [facility] term goals  Time Frame for Short term goals: 4 weeks  Short term goal 1: Pt will demonstrate knowledge of ADL guidelines to decrease stress at spine by observation of proper transfers, bed mobility and lifting techniques. NOT MET- Pt demos good technique with transfers, but requires verbal and visual cues for log roll technique with bed mobility. Short term goal 2: Pt will demonstate knowledge of exercise guidelines and positions to avoid to decrease stress on spine. NOT MET- Pt continues to require cues- discontinue. Short term goal 3: Pt will perform B step stance and tandem balance x10 sec with minimal sway to improve dynamic balance for gait on various terrain and decreased fall risk. MET. B tandem 10-13 sec. **REVISED GOAL: Pt will perform B tandem stance on foam x15 sec with minmal sway to improve gait with appropriate AD, good step length and heel/toe pattern on various terrain to decrease fall risk. Short term goal 4: Pt will demo good postural awareness with <2 verbal cues during therapy session to carry over to functional mobility and tasks. NOT MET- Pt requires periodic cues for posture during session, but self corrects when standing in front of mirror. Long term goals  Time Frame for Long term goals : 8 weeks  Long term goal 1: Pt will be independent and compliant with HEP to achieve above goals. ONGOING- Pt verbalizes fair compliance with current HEP and focusing on gait pattern and posture. Revised Short-Term Goals:    Short term goals  Time Frame for Short term goals: 4 weeks  Short term goal 1: Pt will demonstrate knowledge of ADL guidelines to decrease stress at spine by observation of proper transfers and bed mobility. Short term goal 2: Pt will ambulate with walker with good heel/toe pattern, improved speed, and erect posture without cues to decrease fall risk and improve community mobility. Short term goal 3: Pt will perform B tandem stance on foam x15 sec with minmal sway to improve gait on uneven terrain. Short term goal 4: Pt will demo good postural awareness with <2 verbal cues during therapy session to carry over to functional mobility and tasks.       Revised Long-Term Goals  Long term goals  Time Frame for Long term goals : 6 weeks  Long term goal 1: Pt will be independent and compliant with HEP to achieve above goals.    Moon Ospina, PT

## 2019-06-11 ENCOUNTER — OFFICE VISIT (OUTPATIENT)
Dept: PSYCHOLOGY | Age: 64
End: 2019-06-11
Payer: MEDICARE

## 2019-06-11 ENCOUNTER — APPOINTMENT (OUTPATIENT)
Dept: PHYSICAL THERAPY | Age: 64
End: 2019-06-11
Payer: MEDICARE

## 2019-06-11 DIAGNOSIS — K58.1 IRRITABLE BOWEL SYNDROME WITH CONSTIPATION: Primary | ICD-10-CM

## 2019-06-11 DIAGNOSIS — F33.1 MODERATE EPISODE OF RECURRENT MAJOR DEPRESSIVE DISORDER (HCC): ICD-10-CM

## 2019-06-11 DIAGNOSIS — F41.1 GENERALIZED ANXIETY DISORDER: Chronic | ICD-10-CM

## 2019-06-11 DIAGNOSIS — I63.00 CEREBRAL INFARCTION DUE TO THROMBOSIS OF PRECEREBRAL ARTERY (HCC): Chronic | ICD-10-CM

## 2019-06-11 PROCEDURE — 90834 PSYTX W PT 45 MINUTES: CPT | Performed by: PSYCHOLOGIST

## 2019-06-11 NOTE — PROGRESS NOTES
[facility]  PHYSICAL THERAPY MISSED TREATMENT NOTE  OUTPATIENT REHABILITATION    Date: 2019  Patient Name: Dimitri Valdez        MRN: [id number]   : 1955  (70 y.o.)  Gender: female   Referring Practitioner: Fly Leone CNP  Diagnosis: Age-related osteoporosis without current pathological fracture M81.0    PT Visit Information  Canceled Appointment: 2    REASON FOR MISSED TREATMENT:  Cancelled due to illness. Pt reports having bowel issues and going to see the doctor. Pt will plan to be at next scheduled appointment on 19.      Olinda Herring DPT, #[id number]

## 2019-06-11 NOTE — PROGRESS NOTES
somewhat dysphoric      Affect:  Sad, anxious. Appearance:  Normal   Activity:  Normal   Gait/Posture: Slow, small shuffling steps, using a walker   Speech:  Normal, talkative   Thought Process:  within normal limits   Thought Content: Within Normal Limits   Cognition:  Normal   Memory: Normal   Insight:  good   Judgment: fair and Normal   Suicidal Ideations: Denies Suicidal Ideations   Homicidal Ideations: Denies Homicidal Ideations   Medication Side Effects: absent       Attention Span Normal     Screenings Completed in This Encounter:                                      DIAGNOSIS AND ASSESSMENT DATA     DIAGNOSIS:  History of depression and anxiety, old CVA    PLAN   Follow-up:  No follow-ups on file. Homework assignment before next session:     [] Practice deep breathing 1-2 times per day for 15 minutes, as demonstrated in session, and/or:    [] Go to [facility]\" web site and begin practicing with their free meditation podcasts    [x] Track thoughts that you think feed anxiety or depression    [] Go to [employer]\" web site, open up the \"Workbooks\" tab, and select a problem from the list that best suits your needs. Review the first module. [x] Begin to track physical activity. Even five minutes per day will be helpful.    [] Talk with your physician about whether it's OK to start fish oil (burpless) or flax oil, 1000 to 1200 mg per day    [] Most importantly, remember this guideline: \"Don't believe everything you think! \"   :)    [] Try \"Expressive Writing\" using the guidelines on the handout    [x] Start yoga class, as discussed. 1. Re-start at [facility], when feasible for you. Let the trainers tailor a program to your needs. Patient identified this as a positive benefit. 2. Continue enjoying time with friends on a regular basis. 3. Assert yourself when it comes to [place] and the finances. 4. Practice daily relaxation training, again.

## 2019-06-11 NOTE — Clinical Note
Hi, Dr. Jacob Tavarez and Dr. Scott Snow. Just thought I'd touch base. I see that Stuart Hernandez has had transient confusion episodes (not likely dementia). I am advising her to speak with one of you regarding her hydration needs, since her BUN/creatinine have been elevated the past few times. I will work with her and her  Margarito Elizabeth about how to implement your recommendations. I think Stuart Hernandez would also benefit from a couple of Speech Therapy sessions to evaluate current function, and treat as needed. If they identify persisting deficits that do not fluctuate, I'd recommend a referral to Dr. Fermin Shannon, a really outstanding neuropsychologist based in [place], [place] [phone]). Thanks much!

## 2019-06-19 ENCOUNTER — HOSPITAL ENCOUNTER (OUTPATIENT)
Dept: PHYSICAL THERAPY | Age: 64
Setting detail: THERAPIES SERIES
Discharge: HOME | End: 2019-06-19
Payer: MEDICARE

## 2019-06-19 PROCEDURE — 97110 THERAPEUTIC EXERCISES: CPT

## 2019-06-19 NOTE — PROGRESS NOTES
[place]     Time In: 916  Time Out: 2939  Minutes: 39  Timed Code Treatment Minutes: 39 Minutes    Date: 2019  Patient Name: Gunner Clemons,  Gender:  female        CSN: [id number]   : 1955  (70 y.o.)       Referring Practitioner: Mayda Olivier CNP      Diagnosis: Age-related osteoporosis without current pathological fracture M81.0  Treatment Diagnosis: osteoporosis affecting body mechanics and posture   Additional Pertinent Hx: PMH: HTN, kidney stones, anxiety, memory issueus, fibromyalgia, osteoporosis, 2 large stroke 6 years ago. General:  PT Visit Information  Onset Date: 19  PT Insurance Information: Medicare- unlimited visits based on medical necessity and cap, aquatics and modalities covered except ionto, HP/CP  Total # of Visits to Date: 6  Plan of Care/Certification Expiration Date: 19  Canceled Appointment: 2  Progress Note Counter: 0/10 for PN. PN completed 19 on visit 10. Subjective:  Chart Reviewed: Yes  Family / Caregiver Present: No  Comments: Follow up with Jenni Welch 19. Other (Comment): Referral for bone fragility program.      Subjective: Pt denies pain. Pt reports Dr. Kalpana Narvaez said she was going to order speech therapy but pt hasn't heard anything more on it. Pt presents with rollator. Pain:  Patient Currently in Pain: No(no rating given)         Objective           Exercises  Exercise 5: Nustep S5, A7 L2 x6 min  Exercise 7: Standing on foam: heel/toe raises, slow march, 3 way hip and mini squats x 10 -1 UE support  Exercise 8: Rocker Board 2 directions x 15, balance 30 seconds SBA during balance with 1 UE support  Exercise 12: Hydrostick feet together F/B x 10, S/S x10 each, circles CW, CCW x5 each -SBA, cues for posture and abd bracing  Exercise 13: Standing B calf stretch at step 3x15 sec hold  Exercise 15:  Toe taps onto mushrooms x15 -1 UE  Exercise 16: Step ups 4\" forward, lateral B x10 each -BUE support, cues for abd bracing         Activity Tolerance:  Activity Tolerance: Patient Tolerated treatment well    Assessment: Body structures, Functions, Activity limitations: Decreased functional mobility , Decreased ADL status, Decreased strength, Decreased balance  Assessment: Progressed reps with few exercises but continued to address balance and have pt perform most activities with 1 UE support. Pt requires periodic cues for proper gait pattern with rollator. Prognosis: Good       Patient Education:  Patient Education: Continue with HEP. Focus on gait and posture. Plan:  Times per week: 2x  Plan weeks: 8 weeks(cert extended 6 weeks)  Specific instructions for Next Treatment: progress balance exericses focusing on posture, and gait  Current Treatment Recommendations: Strengthening, Balance Training, Gait Training, Manual Therapy - Soft Tissue Mobilization, Home Exercise Program, Patient/Caregiver Education & Training, Modalities, Functional Mobility Training, Transfer Training, ADL/Self-care Training  Plan Comment: New goals set to further progress mobility. Goals:  Patient goals : [facility] [address] [facility]  Time Frame for Short term goals: 4 weeks  Short term goal 1: Pt will demonstrate knowledge of ADL guidelines to decrease stress at spine by observation of proper transfers and bed mobility. Short term goal 2: Pt will ambulate with walker with good heel/toe pattern, improved speed, and erect posture without cues to decrease fall risk and improve community mobility. Short term goal 3: Pt will perform B tandem stance on foam x15 sec with minmal sway to improve gait on uneven terrain. Short term goal 4: Pt will demo good postural awareness with <2 verbal cues during therapy session to carry over to functional mobility and tasks.     Long term goals  Time Frame for Long term goals : 6 weeks  Long term goal 1: Pt will be independent and compliant with HEP to achieve above goals.      Akash Cortes, PT

## 2019-06-21 ENCOUNTER — APPOINTMENT (OUTPATIENT)
Dept: PHYSICAL THERAPY | Age: 64
End: 2019-06-21
Payer: MEDICARE

## 2019-06-24 ENCOUNTER — HOSPITAL ENCOUNTER (OUTPATIENT)
Dept: PHYSICAL THERAPY | Age: 64
Setting detail: THERAPIES SERIES
Discharge: HOME | End: 2019-06-24
Payer: MEDICARE

## 2019-06-24 PROCEDURE — 97110 THERAPEUTIC EXERCISES: CPT

## 2019-06-24 NOTE — PROGRESS NOTES
[facility]     Time In:   Time Out:   Minutes: 30  Timed Code Treatment Minutes: 30 Minutes    Date: 2019  Patient Name: Álvaro Dimas,  Gender:  female        CSN: [id number]   : 1955  (70 y.o.)       Referring Practitioner: Cierra Alatorre CNP      Diagnosis: Age-related osteoporosis without current pathological fracture M81.0  Treatment Diagnosis: osteoporosis affecting body mechanics and posture   Additional Pertinent Hx: PMH: HTN, kidney stones, anxiety, memory issueus, fibromyalgia, osteoporosis, 2 large stroke 6 years ago. General:  PT Visit Information  Onset Date: 19  PT Insurance Information: Medicare- unlimited visits based on medical necessity and cap, aquatics and modalities covered except ionto, HP/CP  Total # of Visits to Date: 15  Plan of Care/Certification Expiration Date: 19  Progress Note Counter: 1/10 for PN. PN completed 19 on visit 10. Subjective:  Chart Reviewed: Yes  Comments: Follow up with Mercedes Welch 19. Other (Comment): Referral for bone fragility program.      Subjective: Pt very concerned about her memory, as she continues to do things out of the ordinary or forgets little things. Pt presents with front wheeled walker but  took swivel wheels off causing her more trouble turning walker. Pain:  Patient Currently in Pain: No(no rating given)         Objective         Exercises  Exercise 5: Nustep S5, A7 L2 x6 min  Exercise 7: Standing on foam: heel/toe raises, slow march, 3 way hip and mini squats x 15 -1 UE support  Exercise 8: Rocker Board 2 directions x 15, balance 30 seconds SBA during balance with 2 finger support (F/B only today d/t time)  Exercise 15:  Toe taps onto mushrooms x15 -1 UE  Exercise 16: Step ups 4\" forward, lateral B x10 each -BUE support, cues for abd bracing         Activity Tolerance:  Activity

## 2019-06-27 ENCOUNTER — APPOINTMENT (OUTPATIENT)
Dept: PHYSICAL THERAPY | Age: 64
End: 2019-06-27
Payer: MEDICARE

## 2019-07-02 ENCOUNTER — HOSPITAL ENCOUNTER (OUTPATIENT)
Dept: PHYSICAL THERAPY | Age: 64
Setting detail: THERAPIES SERIES
Discharge: HOME | End: 2019-07-02
Payer: MEDICARE

## 2019-07-02 ENCOUNTER — TELEPHONE (OUTPATIENT)
Dept: FAMILY MEDICINE CLINIC | Age: 64
End: 2019-07-02

## 2019-07-02 PROCEDURE — 97110 THERAPEUTIC EXERCISES: CPT

## 2019-07-02 NOTE — PROGRESS NOTES
in hallway x40ft -difficulty coordinating; standing hip flexion with shoulder flexion x5 each side, x5 alternating         Activity Tolerance:  Activity Tolerance: Patient Tolerated treatment well    Assessment: Body structures, Functions, Activity limitations: Decreased functional mobility , Decreased ADL status, Decreased strength, Decreased balance  Assessment: Continued with balance and gait training with initiation of forward march walking working on arm swing, tband postural strengthening with feet together and resumed hydrostick with good tolerance. Pt demos coordination difficulty causing her to get frustrated. Pt continues to be challenged to perform activities with minimal UE support. Prognosis: Good       Patient Education:  Patient Education: Continue with HEP. Focus on gait with arm swing and posture. Plan:  Times per week: 2x  Plan weeks: 8 weeks(cert extended 6 weeks)  Current Treatment Recommendations: Strengthening, Balance Training, Gait Training, Manual Therapy - Soft Tissue Mobilization, Home Exercise Program, Patient/Caregiver Education & Training, Modalities, Functional Mobility Training, Transfer Training, ADL/Self-care Training    Goals:  Patient goals : Return to exercising at [address] [place] term goals  Time Frame for Short term goals: 4 weeks  Short term goal 1: Pt will demonstrate knowledge of ADL guidelines to decrease stress at spine by observation of proper transfers and bed mobility. Short term goal 2: Pt will ambulate with walker with good heel/toe pattern, improved speed, and erect posture without cues to decrease fall risk and improve community mobility. Short term goal 3: Pt will perform B tandem stance on foam x15 sec with minmal sway to improve gait on uneven terrain. Short term goal 4: Pt will demo good postural awareness with <2 verbal cues during therapy session to carry over to functional mobility and tasks.     Long term goals  Time Frame for Long term

## 2019-07-07 ENCOUNTER — HOSPITAL ENCOUNTER (EMERGENCY)
Age: 64
Discharge: HOME | End: 2019-07-07
Payer: MEDICARE

## 2019-07-07 VITALS
HEART RATE: 83 BPM | OXYGEN SATURATION: 99 % | TEMPERATURE: 97.6 F | SYSTOLIC BLOOD PRESSURE: 156 MMHG | RESPIRATION RATE: 16 BRPM | DIASTOLIC BLOOD PRESSURE: 67 MMHG | BODY MASS INDEX: 23.45 KG/M2 | WEIGHT: 136.6 LBS

## 2019-07-07 DIAGNOSIS — R10.31 RIGHT LOWER QUADRANT ABDOMINAL PAIN: Primary | ICD-10-CM

## 2019-07-07 DIAGNOSIS — R82.998 LEUKOCYTES IN URINE: ICD-10-CM

## 2019-07-07 LAB
BILIRUBIN URINE: NEGATIVE
BLOOD, URINE: NEGATIVE
CHARACTER, URINE: CLEAR
COLOR: YELLOW
GLUCOSE, URINE: NEGATIVE MG/DL
KETONES, URINE: NEGATIVE
LEUKOCYTES, UA: ABNORMAL
NITRATE, UA: NEGATIVE
PH UA: 5.5 (ref 5–9)
PROTEIN UA: NEGATIVE MG/DL
REFLEX TO URINE C & S: ABNORMAL
SPECIFIC GRAVITY UA: 1.02 (ref 1–1.03)
UROBILINOGEN, URINE: 0.2 EU/DL (ref 0–1)

## 2019-07-07 PROCEDURE — 81003 URINALYSIS AUTO W/O SCOPE: CPT

## 2019-07-07 PROCEDURE — 87086 URINE CULTURE/COLONY COUNT: CPT

## 2019-07-07 PROCEDURE — 99213 OFFICE O/P EST LOW 20 MIN: CPT | Performed by: NURSE PRACTITIONER

## 2019-07-07 PROCEDURE — 99214 OFFICE O/P EST MOD 30 MIN: CPT

## 2019-07-07 RX ORDER — CEFDINIR 300 MG/1
300 CAPSULE ORAL 2 TIMES DAILY
Qty: 10 CAPSULE | Refills: 0 | Status: SHIPPED | OUTPATIENT
Start: 2019-07-07 | End: 2019-07-12

## 2019-07-07 ASSESSMENT — ENCOUNTER SYMPTOMS
DIARRHEA: 1
BLOOD IN STOOL: 0
SHORTNESS OF BREATH: 0
NAUSEA: 0
COUGH: 0
SORE THROAT: 0
VOMITING: 0
ABDOMINAL PAIN: 1

## 2019-07-07 NOTE — ED PROVIDER NOTES
[facility]  Urgent Care Encounter       CHIEF COMPLAINT       Chief Complaint   Patient presents with    Abdominal Pain     \" i have a kink in my bowel and they gave me med tomake me go. Watery stools on friday and saturday every time I take that medicine       Nurses Notes reviewed and I agree except as noted in the HPI. HISTORY OF PRESENT ILLNESS   Imelda Staples is a 70 y.o. female who presents for evaluation of right lower abdominal pain has been ongoing for the past day. Patient states that she has a history of issues with bowel movements and takes Amitiza for previous constipation. Patient states that she is concerned as she has seen gastroenterology in the past and has been told that she has a \"kink\" in her colon. She states that she took her Amitiza 3 days ago and then had diarrhea for the next 2 days. She states that she wants to have her abdominal pain evaluated as she does not normally experience this after taking the medication. Neither any fever, chills, nausea, or vomiting. Patient states that she has had an appendectomy in the past.    The history is provided by the patient. REVIEW OF SYSTEMS     Review of Systems   Constitutional: Negative for chills and fever. HENT: Negative for congestion and sore throat. Respiratory: Negative for cough and shortness of breath. Cardiovascular: Negative for chest pain. Gastrointestinal: Positive for abdominal pain and diarrhea. Negative for blood in stool, nausea and vomiting. Genitourinary: Negative for dysuria. Musculoskeletal: Negative for arthralgias and myalgias. Skin: Negative for rash. Allergic/Immunologic: Positive for environmental allergies. Neurological: Negative for headaches.        PAST MEDICAL HISTORY         Diagnosis Date    Allergic rhinitis     Anxiety     CKD stage 4 due to type 2 diabetes mellitus (HCC)     CVA (cerebral infarction)     Depression     Fibromyalgia    

## 2019-07-08 ENCOUNTER — OFFICE VISIT (OUTPATIENT)
Dept: PSYCHOLOGY | Age: 64
End: 2019-07-08
Payer: MEDICARE

## 2019-07-08 DIAGNOSIS — F41.1 GENERALIZED ANXIETY DISORDER: Primary | Chronic | ICD-10-CM

## 2019-07-08 DIAGNOSIS — F33.1 MODERATE EPISODE OF RECURRENT MAJOR DEPRESSIVE DISORDER (HCC): ICD-10-CM

## 2019-07-08 PROCEDURE — 90837 PSYTX W PT 60 MINUTES: CPT | Performed by: PSYCHOLOGIST

## 2019-07-08 NOTE — PROGRESS NOTES
[address] [address]  [address] [place] [address]  [address]  Dept: [phone]  Dept Fax: [phone]: [phone]    Patient: Gaby Mccann  Visit Date: 7/8/2019  Referring Provider:   No ref. provider found    SUBJECTIVE DATA     CHIEF COMPLAINT:    Chief Complaint   Patient presents with    Anxiety       Patient update: We talked about her ongoing perceived memory issues. She would like to go to Speech Therapy to work on that, which I believe is appropriate. With her history of CVA, and her perceived recent decline in memory, she would benefit from some evaluation and remediation strategies, as needed. She reports another possible UTI, when she went in to see the Urgent Care doc. She had a lot of pain in her abdomen, LLQ, and she finds she is anxious and distressed about whether \"I might rupture something when I get diarrhea that is so bad. \"     She has been trying to drink more fluids, and her  had agreed to help her, but he has dropped off after one day. Dr. Ephraim Molina told her to drink four bottles per day, and we did more problem-solving on how to do that more reliably. She reports she lost her home IBS self-hypnosis recording, which had helped her, so I made another recording for her in session, and she agreed to use it. Gut-directed hypnotherapy has a robust evidence base going back to the mid 1980s, with many recent meta-analyses confirming its use. She has done well with it in the past.     OBJECTIVE DATA     Physical Exam:    Mental Status Evaluation:   Orientation: Alert, oriented. Mood:. Anxious, somewhat dysphoric      Affect:  Sad, anxious. Appearance:  Normal   Activity:  Normal   Gait/Posture: Slow, small shuffling steps, no longer using a walker   Speech:  Normal, talkative   Thought Process:  within normal limits   Thought Content:   Within Normal Limits   Cognition:  Normal   Memory: Normal   Insight:  good

## 2019-07-09 ENCOUNTER — HOSPITAL ENCOUNTER (OUTPATIENT)
Dept: PHYSICAL THERAPY | Age: 64
Setting detail: THERAPIES SERIES
Discharge: HOME | End: 2019-07-09
Payer: MEDICARE

## 2019-07-09 LAB
ORGANISM: ABNORMAL
URINE CULTURE REFLEX: ABNORMAL

## 2019-07-09 PROCEDURE — 97116 GAIT TRAINING THERAPY: CPT

## 2019-07-09 PROCEDURE — 97110 THERAPEUTIC EXERCISES: CPT

## 2019-07-11 ENCOUNTER — HOSPITAL ENCOUNTER (OUTPATIENT)
Dept: PHYSICAL THERAPY | Age: 64
Setting detail: THERAPIES SERIES
Discharge: HOME | End: 2019-07-11
Payer: MEDICARE

## 2019-07-11 PROCEDURE — 97110 THERAPEUTIC EXERCISES: CPT

## 2019-07-11 PROCEDURE — 97116 GAIT TRAINING THERAPY: CPT

## 2019-07-11 NOTE — PROGRESS NOTES
sidestepping x20ft and grapevine 20ft         Activity Tolerance:  Activity Tolerance: Patient Tolerated treatment well    Assessment: Body structures, Functions, Activity limitations: Decreased functional mobility , Decreased ADL status, Decreased strength, Decreased balance  Assessment: Focused on arm swing with gait, walking with and without ziddy sticks. Improved coordination with time but cues required. Pt continues to require cues for posture during balance exercises. Prognosis: Good       Patient Education:  Patient Education: Continue with HEP. Practice arm swing with gait. Plan:  Times per week: 2x  Plan weeks: 8 weeks(cert extended 6 weeks)  Current Treatment Recommendations: Strengthening, Balance Training, Gait Training, Manual Therapy - Soft Tissue Mobilization, Home Exercise Program, Patient/Caregiver Education & Training, Modalities, Functional Mobility Training, Transfer Training, ADL/Self-care Training    Goals:  Patient goals : Return to exercising at [address] [place] term goals  Time Frame for Short term goals: 4 weeks  Short term goal 1: Pt will demonstrate knowledge of ADL guidelines to decrease stress at spine by observation of proper transfers and bed mobility. Short term goal 2: Pt will ambulate with walker with good heel/toe pattern, improved speed, and erect posture without cues to decrease fall risk and improve community mobility. Short term goal 3: Pt will perform B tandem stance on foam x15 sec with minmal sway to improve gait on uneven terrain. Short term goal 4: Pt will demo good postural awareness with <2 verbal cues during therapy session to carry over to functional mobility and tasks. Long term goals  Time Frame for Long term goals : 6 weeks  Long term goal 1: Pt will be independent and compliant with HEP to achieve above goals.      Lois Muller, PT

## 2019-07-16 ENCOUNTER — APPOINTMENT (OUTPATIENT)
Dept: PHYSICAL THERAPY | Age: 64
End: 2019-07-16
Payer: MEDICARE

## 2019-07-16 NOTE — PROGRESS NOTES
[facility]  Bone Fragility Follow up     Visit Date: 7/17/2019  MRN: [id number]  Cc:   Chief Complaint   Patient presents with    6 Month Follow-Up     OSTEOPOROSIS        HPI:   Dave Wang  is a(n)63 y.o. female here today for follow up of Osteoporosis. She received first Prolia injection 3/18/2019. Next injection is scheduled for September. Patient did not have any side effects with injection, other than she did experience some hypocalcemia. She follow with nephrology as well. Will need to recheck calcium level prior to next injection and monitor closely afterwards again. Patient has been doing physical therapy here at [address] [place] [address]. She is very impressed with her therapist, and feels that she has helped her a lot with some residual effects she had from CVA and get gait. She feels her balance and strength have improved and is less of a fall risk. No falls, no fractures. Still taking calcium (1000 mg daily) and vit D supplement. ROS:  Review of Systems   Constitutional: Positive for fatigue. Negative for chills and fever. HENT: Negative for congestion and trouble swallowing. Eyes: Positive for itching. Respiratory: Negative for cough and shortness of breath. Cardiovascular: Negative for chest pain and leg swelling. Gastrointestinal: Positive for constipation. Negative for nausea and vomiting. Endocrine: Negative for cold intolerance and heat intolerance. Genitourinary: Negative for difficulty urinating and frequency. Musculoskeletal: Positive for arthralgias, back pain and neck pain. Negative for joint swelling. Skin: Negative for rash and wound. Neurological: Positive for tremors, weakness and numbness (toes). Hematological: Does not bruise/bleed easily. Psychiatric/Behavioral: The patient is nervous/anxious.           PAST MEDICAL HISTORY  Past Medical History:   Diagnosis Date    Allergic rhinitis     Anxiety     CKD stage 4 due to type 2

## 2019-07-17 ENCOUNTER — OFFICE VISIT (OUTPATIENT)
Dept: RHEUMATOLOGY | Age: 64
End: 2019-07-17
Payer: MEDICARE

## 2019-07-17 VITALS
HEIGHT: 64 IN | SYSTOLIC BLOOD PRESSURE: 130 MMHG | DIASTOLIC BLOOD PRESSURE: 80 MMHG | WEIGHT: 136.69 LBS | BODY MASS INDEX: 23.34 KG/M2

## 2019-07-17 DIAGNOSIS — Z51.81 MEDICATION MONITORING ENCOUNTER: ICD-10-CM

## 2019-07-17 DIAGNOSIS — M81.0 AGE-RELATED OSTEOPOROSIS WITHOUT CURRENT PATHOLOGICAL FRACTURE: Primary | ICD-10-CM

## 2019-07-17 DIAGNOSIS — E83.51 HYPOCALCEMIA: ICD-10-CM

## 2019-07-17 PROCEDURE — 1123F ACP DISCUSS/DSCN MKR DOCD: CPT | Performed by: NURSE PRACTITIONER

## 2019-07-17 PROCEDURE — G8598 ASA/ANTIPLAT THER USED: HCPCS | Performed by: NURSE PRACTITIONER

## 2019-07-17 PROCEDURE — 1036F TOBACCO NON-USER: CPT | Performed by: NURSE PRACTITIONER

## 2019-07-17 PROCEDURE — G8399 PT W/DXA RESULTS DOCUMENT: HCPCS | Performed by: NURSE PRACTITIONER

## 2019-07-17 PROCEDURE — G8427 DOCREV CUR MEDS BY ELIG CLIN: HCPCS | Performed by: NURSE PRACTITIONER

## 2019-07-17 PROCEDURE — 1090F PRES/ABSN URINE INCON ASSESS: CPT | Performed by: NURSE PRACTITIONER

## 2019-07-17 PROCEDURE — 3017F COLORECTAL CA SCREEN DOC REV: CPT | Performed by: NURSE PRACTITIONER

## 2019-07-17 PROCEDURE — 4040F PNEUMOC VAC/ADMIN/RCVD: CPT | Performed by: NURSE PRACTITIONER

## 2019-07-17 PROCEDURE — G8420 CALC BMI NORM PARAMETERS: HCPCS | Performed by: NURSE PRACTITIONER

## 2019-07-17 PROCEDURE — 99213 OFFICE O/P EST LOW 20 MIN: CPT | Performed by: NURSE PRACTITIONER

## 2019-07-17 ASSESSMENT — ENCOUNTER SYMPTOMS
NAUSEA: 0
TROUBLE SWALLOWING: 0
VOMITING: 0
BACK PAIN: 1
EYE ITCHING: 1
CONSTIPATION: 1
SHORTNESS OF BREATH: 0
COUGH: 0

## 2019-07-18 ENCOUNTER — HOSPITAL ENCOUNTER (OUTPATIENT)
Dept: PHYSICAL THERAPY | Age: 64
Setting detail: THERAPIES SERIES
Discharge: HOME | End: 2019-07-18
Payer: MEDICARE

## 2019-07-18 PROCEDURE — 97530 THERAPEUTIC ACTIVITIES: CPT

## 2019-07-18 PROCEDURE — 97116 GAIT TRAINING THERAPY: CPT

## 2019-07-18 PROCEDURE — 97110 THERAPEUTIC EXERCISES: CPT

## 2019-07-18 NOTE — DISCHARGE SUMMARY
weeks  Long term goal 1: Pt will be independent and compliant with HEP to achieve above goals.  MET- Pt verbalizes compliance with HEP    Frankie Brady PT

## 2019-08-01 RX ORDER — CALCIUM CARBONATE/VITAMIN D3 500MG-5MCG
TABLET ORAL
Qty: 180 TABLET | Refills: 3 | Status: SHIPPED | OUTPATIENT
Start: 2019-08-01 | End: 2020-08-06 | Stop reason: SDUPTHER

## 2019-08-12 RX ORDER — AMLODIPINE BESYLATE 10 MG/1
10 TABLET ORAL DAILY
Qty: 90 TABLET | Refills: 1 | Status: SHIPPED | OUTPATIENT
Start: 2019-08-12 | End: 2020-02-05

## 2019-08-12 NOTE — TELEPHONE ENCOUNTER
Refill request for Amlodipine 10 mg 1 po qd # 90 refill 1 pending for signature to go to [employer]. She said she only has 3 left.

## 2019-08-19 ENCOUNTER — OFFICE VISIT (OUTPATIENT)
Dept: PSYCHOLOGY | Age: 64
End: 2019-08-19
Payer: MEDICARE

## 2019-08-19 DIAGNOSIS — F33.1 MODERATE EPISODE OF RECURRENT MAJOR DEPRESSIVE DISORDER (HCC): ICD-10-CM

## 2019-08-19 DIAGNOSIS — F41.1 GENERALIZED ANXIETY DISORDER: Primary | Chronic | ICD-10-CM

## 2019-08-19 PROCEDURE — 90834 PSYTX W PT 45 MINUTES: CPT | Performed by: PSYCHOLOGIST

## 2019-08-27 ENCOUNTER — NURSE ONLY (OUTPATIENT)
Dept: FAMILY MEDICINE CLINIC | Age: 64
End: 2019-08-27
Payer: MEDICARE

## 2019-08-27 DIAGNOSIS — N18.4 CHRONIC KIDNEY DISEASE, STAGE IV (SEVERE) (HCC): ICD-10-CM

## 2019-08-27 LAB
ANION GAP SERPL CALCULATED.3IONS-SCNC: 13 MEQ/L (ref 8–16)
BUN BLDV-MCNC: 29 MG/DL (ref 7–22)
CALCIUM SERPL-MCNC: 9.2 MG/DL (ref 8.5–10.5)
CHLORIDE BLD-SCNC: 107 MEQ/L (ref 98–111)
CO2: 24 MEQ/L (ref 23–33)
CREAT SERPL-MCNC: 1.8 MG/DL (ref 0.4–1.2)
CREATININE URINE: 186.2 MG/DL
CREATININE, URINE: 186.2 MG/DL
GFR SERPL CREATININE-BSD FRML MDRD: 28 ML/MIN/1.73M2
GLUCOSE BLD-MCNC: 91 MG/DL (ref 70–108)
MICROALBUMIN UR-MCNC: < 1.2 MG/DL
MICROALBUMIN/CREAT UR-RTO: 6 MG/G (ref 0–30)
POTASSIUM SERPL-SCNC: 4.1 MEQ/L (ref 3.5–5.2)
PROT/CREAT RATIO, UR: 0.1
PROTEIN, URINE: 19.2 MG/DL
SODIUM BLD-SCNC: 144 MEQ/L (ref 135–145)

## 2019-08-27 PROCEDURE — 36415 COLL VENOUS BLD VENIPUNCTURE: CPT | Performed by: FAMILY MEDICINE

## 2019-09-05 ENCOUNTER — OFFICE VISIT (OUTPATIENT)
Dept: NEPHROLOGY | Age: 64
End: 2019-09-05
Payer: MEDICARE

## 2019-09-05 VITALS
WEIGHT: 138 LBS | DIASTOLIC BLOOD PRESSURE: 80 MMHG | OXYGEN SATURATION: 100 % | HEART RATE: 63 BPM | SYSTOLIC BLOOD PRESSURE: 120 MMHG | BODY MASS INDEX: 23.68 KG/M2

## 2019-09-05 DIAGNOSIS — N18.4 CHRONIC KIDNEY DISEASE, STAGE IV (SEVERE) (HCC): Primary | ICD-10-CM

## 2019-09-05 DIAGNOSIS — I10 ESSENTIAL HYPERTENSION: ICD-10-CM

## 2019-09-05 PROCEDURE — 99213 OFFICE O/P EST LOW 20 MIN: CPT | Performed by: INTERNAL MEDICINE

## 2019-09-05 PROCEDURE — G8427 DOCREV CUR MEDS BY ELIG CLIN: HCPCS | Performed by: INTERNAL MEDICINE

## 2019-09-05 PROCEDURE — 1036F TOBACCO NON-USER: CPT | Performed by: INTERNAL MEDICINE

## 2019-09-05 PROCEDURE — G8420 CALC BMI NORM PARAMETERS: HCPCS | Performed by: INTERNAL MEDICINE

## 2019-09-05 PROCEDURE — 1123F ACP DISCUSS/DSCN MKR DOCD: CPT | Performed by: INTERNAL MEDICINE

## 2019-09-05 PROCEDURE — G8598 ASA/ANTIPLAT THER USED: HCPCS | Performed by: INTERNAL MEDICINE

## 2019-09-05 PROCEDURE — 3017F COLORECTAL CA SCREEN DOC REV: CPT | Performed by: INTERNAL MEDICINE

## 2019-09-05 PROCEDURE — 1090F PRES/ABSN URINE INCON ASSESS: CPT | Performed by: INTERNAL MEDICINE

## 2019-09-05 PROCEDURE — G8399 PT W/DXA RESULTS DOCUMENT: HCPCS | Performed by: INTERNAL MEDICINE

## 2019-09-05 PROCEDURE — 4040F PNEUMOC VAC/ADMIN/RCVD: CPT | Performed by: INTERNAL MEDICINE

## 2019-09-09 ENCOUNTER — OFFICE VISIT (OUTPATIENT)
Dept: FAMILY MEDICINE CLINIC | Age: 64
End: 2019-09-09
Payer: MEDICARE

## 2019-09-09 VITALS
WEIGHT: 137 LBS | SYSTOLIC BLOOD PRESSURE: 114 MMHG | BODY MASS INDEX: 23.39 KG/M2 | HEART RATE: 60 BPM | DIASTOLIC BLOOD PRESSURE: 70 MMHG | HEIGHT: 64 IN

## 2019-09-09 DIAGNOSIS — K59.09 CHRONIC CONSTIPATION: ICD-10-CM

## 2019-09-09 DIAGNOSIS — Z00.00 ROUTINE GENERAL MEDICAL EXAMINATION AT A HEALTH CARE FACILITY: Primary | ICD-10-CM

## 2019-09-09 DIAGNOSIS — F51.04 PSYCHOPHYSIOLOGICAL INSOMNIA: ICD-10-CM

## 2019-09-09 PROCEDURE — 4040F PNEUMOC VAC/ADMIN/RCVD: CPT | Performed by: FAMILY MEDICINE

## 2019-09-09 PROCEDURE — 3017F COLORECTAL CA SCREEN DOC REV: CPT | Performed by: FAMILY MEDICINE

## 2019-09-09 PROCEDURE — G8598 ASA/ANTIPLAT THER USED: HCPCS | Performed by: FAMILY MEDICINE

## 2019-09-09 PROCEDURE — G0438 PPPS, INITIAL VISIT: HCPCS | Performed by: FAMILY MEDICINE

## 2019-09-09 PROCEDURE — 1123F ACP DISCUSS/DSCN MKR DOCD: CPT | Performed by: FAMILY MEDICINE

## 2019-09-09 RX ORDER — TRAZODONE HYDROCHLORIDE 50 MG/1
TABLET ORAL
Qty: 90 TABLET | Refills: 0 | Status: SHIPPED | OUTPATIENT
Start: 2019-09-09 | End: 2020-02-05 | Stop reason: SDUPTHER

## 2019-09-09 ASSESSMENT — LIFESTYLE VARIABLES: HOW OFTEN DO YOU HAVE A DRINK CONTAINING ALCOHOL: 0

## 2019-09-09 ASSESSMENT — PATIENT HEALTH QUESTIONNAIRE - PHQ9
SUM OF ALL RESPONSES TO PHQ QUESTIONS 1-9: 0
SUM OF ALL RESPONSES TO PHQ QUESTIONS 1-9: 0

## 2019-09-09 NOTE — PROGRESS NOTES
[address]  [address], [place]  Phone:  [phone]  EQL:[phone]         Medicare Annual Wellness Visit    Name: Matthew Perry Date: 2019   MRN: [id number] Sex: Female   Age: 70 y.o. Ethnicity: Non-/Non    : 1955 Race: Gavino Barbosa is here for Medicare AWV; 3 Month Follow-Up; Hypertension; and Hyperlipidemia    Screenings for behavioral, psychosocial and functional/safety risks, and cognitive dysfunction are all negative except as indicated below. These results, as well as other patient data from the [address] form, are documented in Flowsheets linked to this Encounter. Allergies   Allergen Reactions    Actos [Pioglitazone] Nausea And Vomiting    Augmentin [Amoxicillin-Pot Clavulanate] Diarrhea    Ciprofloxacin      unsure    Sulfa Antibiotics Rash       Prior to Visit Medications    Medication Sig Taking? Authorizing Provider   traZODone (DESYREL) 50 MG tablet TAKE 1 TABLET NIGHTLY Yes Tyler Herrera MD   MAGNESIUM CITRATE PO Take by mouth as needed (weekly) Yes Historical Provider, MD   amLODIPine (NORVASC) 10 MG tablet Take 1 tablet by mouth daily Yes Olga Lidia Clarke MD   OYSCO 500 + D 500-200 MG-UNIT per tablet TAKE 2 TABLETS BY MOUTH TWICE DAILY Yes Olga Lidia Clarke MD   simvastatin (ZOCOR) 20 MG tablet TAKE 1 TABLET DAILY Yes Tyler Herrera MD   ranitidine (ZANTAC) 150 MG tablet Take 1 tablet by mouth 2 times daily Yes Tyler Herrera MD   chlordiazePOXIDE-clidinium (LIBRAX) 5-2.5 MG per capsule Take 1 capsule by mouth daily.  TAKE 1 CAPSULE TWICE A DAY AS NEEDED FOR PAIN Yes Tyler Herrera MD   vitamin D (ERGOCALCIFEROL) 33604 units CAPS capsule Take 1 capsule by mouth once a week Yes Tyler Herrera MD   clopidogrel (PLAVIX) 75 MG tablet TAKE 1 TABLET DAILY Yes Tyler Herrera MD   buPROPion [facility]) 150 MG extended release tablet TAKE 1 TABLET TWICE A DAY Yes

## 2019-09-16 ENCOUNTER — OFFICE VISIT (OUTPATIENT)
Dept: PSYCHOLOGY | Age: 64
End: 2019-09-16
Payer: MEDICARE

## 2019-09-16 DIAGNOSIS — F41.1 GENERALIZED ANXIETY DISORDER: Chronic | ICD-10-CM

## 2019-09-16 DIAGNOSIS — F33.1 MODERATE EPISODE OF RECURRENT MAJOR DEPRESSIVE DISORDER (HCC): Primary | ICD-10-CM

## 2019-09-16 PROCEDURE — 90834 PSYTX W PT 45 MINUTES: CPT | Performed by: PSYCHOLOGIST

## 2019-09-19 ENCOUNTER — HOSPITAL ENCOUNTER (OUTPATIENT)
Dept: NURSING | Age: 64
Discharge: HOME | End: 2019-09-19
Payer: MEDICARE

## 2019-09-24 ENCOUNTER — HOSPITAL ENCOUNTER (OUTPATIENT)
Dept: NURSING | Age: 64
Discharge: HOME | End: 2019-09-24
Payer: MEDICARE

## 2019-09-24 VITALS — SYSTOLIC BLOOD PRESSURE: 118 MMHG | DIASTOLIC BLOOD PRESSURE: 55 MMHG | RESPIRATION RATE: 20 BRPM | HEART RATE: 68 BPM

## 2019-09-24 DIAGNOSIS — M81.0 AGE-RELATED OSTEOPOROSIS WITHOUT CURRENT PATHOLOGICAL FRACTURE: Primary | ICD-10-CM

## 2019-09-24 PROCEDURE — 96365 THER/PROPH/DIAG IV INF INIT: CPT

## 2019-09-24 PROCEDURE — 96372 THER/PROPH/DIAG INJ SC/IM: CPT

## 2019-09-24 PROCEDURE — 6360000002 HC RX W HCPCS: Performed by: NURSE PRACTITIONER

## 2019-09-24 RX ORDER — METHYLPREDNISOLONE SODIUM SUCCINATE 125 MG/2ML
125 INJECTION, POWDER, LYOPHILIZED, FOR SOLUTION INTRAMUSCULAR; INTRAVENOUS ONCE
Status: CANCELLED | OUTPATIENT
Start: 2020-03-17

## 2019-09-24 RX ORDER — SODIUM CHLORIDE 9 MG/ML
100 INJECTION, SOLUTION INTRAVENOUS CONTINUOUS
Status: CANCELLED | OUTPATIENT
Start: 2020-03-17

## 2019-09-24 RX ORDER — DIPHENHYDRAMINE HYDROCHLORIDE 50 MG/ML
50 INJECTION INTRAMUSCULAR; INTRAVENOUS ONCE
Status: CANCELLED | OUTPATIENT
Start: 2020-03-17

## 2019-09-24 RX ORDER — 0.9 % SODIUM CHLORIDE 0.9 %
10 VIAL (ML) INJECTION ONCE
Status: CANCELLED | OUTPATIENT
Start: 2020-03-17

## 2019-09-24 RX ADMIN — DENOSUMAB 60 MG: 60 INJECTION SUBCUTANEOUS at 10:28

## 2019-09-24 ASSESSMENT — PAIN - FUNCTIONAL ASSESSMENT: PAIN_FUNCTIONAL_ASSESSMENT: 0-10

## 2019-09-24 NOTE — PROGRESS NOTES
1025:  ARRIVES AMBULATORY WITH WALKER, TO HAVE PROLIA INJECTION.   PROCESS REVIEWED AND PT RIGHTS AND RESPONSIBILITIES OFFERED TO PT.  8602:  PT TOLERATED INJECTION WELL AND PT DISCHARGED AMBULATORY WITH INSTRUCTIONS.    _M___ Safety:       (Environmental)   [place]abb to environment   Ensure ID band is correct and in place/ allergy band as needed   Assess for fall risk   Initiate fall precautions as applicable (fall band, side rails, etc.)   Call light within reach   Bed in low position/ wheels locked    _M___ Pain:        Assess pain level and characteristics   Administer analgesics as ordered   Assess effectiveness of pain management and report to MD as needed    _M___ Knowledge Deficit:   Assess baseline knowledge   Provide teaching at level of understanding   Provide teaching via preferred learning method   Evaluate teaching effectiveness    M____ Hemodynamic/Respiratory Status:       (Pre and Post Procedure Monitoring)   Assess/Monitor vital signs and LOC   Assess Baseline SpO2 prior to any sedation   Obtain weight/height   Assess vital signs/ LOC until patient meets discharge criteria   Monitor procedure site and notify MD of any issues    _

## 2019-09-30 ENCOUNTER — TELEPHONE (OUTPATIENT)
Dept: PSYCHOLOGY | Age: 64
End: 2019-09-30

## 2019-10-22 ENCOUNTER — OFFICE VISIT (OUTPATIENT)
Dept: PSYCHOLOGY | Age: 64
End: 2019-10-22
Payer: MEDICARE

## 2019-10-22 DIAGNOSIS — F41.1 GENERALIZED ANXIETY DISORDER: Primary | Chronic | ICD-10-CM

## 2019-10-22 DIAGNOSIS — F33.1 MODERATE EPISODE OF RECURRENT MAJOR DEPRESSIVE DISORDER (HCC): ICD-10-CM

## 2019-10-22 PROCEDURE — 90834 PSYTX W PT 45 MINUTES: CPT | Performed by: PSYCHOLOGIST

## 2019-11-11 ENCOUNTER — OFFICE VISIT (OUTPATIENT)
Dept: PSYCHOLOGY | Age: 64
End: 2019-11-11
Payer: MEDICARE

## 2019-11-11 DIAGNOSIS — F33.0 MILD EPISODE OF RECURRENT MAJOR DEPRESSIVE DISORDER (HCC): ICD-10-CM

## 2019-11-11 DIAGNOSIS — F41.1 GENERALIZED ANXIETY DISORDER: Primary | Chronic | ICD-10-CM

## 2019-11-11 PROCEDURE — 90834 PSYTX W PT 45 MINUTES: CPT | Performed by: PSYCHOLOGIST

## 2019-11-20 ENCOUNTER — OFFICE VISIT (OUTPATIENT)
Dept: FAMILY MEDICINE CLINIC | Age: 64
End: 2019-11-20
Payer: MEDICARE

## 2019-11-20 VITALS
DIASTOLIC BLOOD PRESSURE: 84 MMHG | SYSTOLIC BLOOD PRESSURE: 118 MMHG | HEIGHT: 64 IN | BODY MASS INDEX: 23.73 KG/M2 | WEIGHT: 139 LBS

## 2019-11-20 DIAGNOSIS — Z23 NEED FOR INFLUENZA VACCINATION: ICD-10-CM

## 2019-11-20 DIAGNOSIS — R21 RASH AND NONSPECIFIC SKIN ERUPTION: Primary | ICD-10-CM

## 2019-11-20 PROCEDURE — G8482 FLU IMMUNIZE ORDER/ADMIN: HCPCS | Performed by: FAMILY MEDICINE

## 2019-11-20 PROCEDURE — 1090F PRES/ABSN URINE INCON ASSESS: CPT | Performed by: FAMILY MEDICINE

## 2019-11-20 PROCEDURE — G8598 ASA/ANTIPLAT THER USED: HCPCS | Performed by: FAMILY MEDICINE

## 2019-11-20 PROCEDURE — G8420 CALC BMI NORM PARAMETERS: HCPCS | Performed by: FAMILY MEDICINE

## 2019-11-20 PROCEDURE — 1036F TOBACCO NON-USER: CPT | Performed by: FAMILY MEDICINE

## 2019-11-20 PROCEDURE — 99213 OFFICE O/P EST LOW 20 MIN: CPT | Performed by: FAMILY MEDICINE

## 2019-11-20 PROCEDURE — 3017F COLORECTAL CA SCREEN DOC REV: CPT | Performed by: FAMILY MEDICINE

## 2019-11-20 PROCEDURE — 90653 IIV ADJUVANT VACCINE IM: CPT | Performed by: FAMILY MEDICINE

## 2019-11-20 PROCEDURE — 1123F ACP DISCUSS/DSCN MKR DOCD: CPT | Performed by: FAMILY MEDICINE

## 2019-11-20 PROCEDURE — G0008 ADMIN INFLUENZA VIRUS VAC: HCPCS | Performed by: FAMILY MEDICINE

## 2019-11-20 PROCEDURE — 4040F PNEUMOC VAC/ADMIN/RCVD: CPT | Performed by: FAMILY MEDICINE

## 2019-11-20 PROCEDURE — G8399 PT W/DXA RESULTS DOCUMENT: HCPCS | Performed by: FAMILY MEDICINE

## 2019-11-20 PROCEDURE — G8427 DOCREV CUR MEDS BY ELIG CLIN: HCPCS | Performed by: FAMILY MEDICINE

## 2019-11-20 RX ORDER — TRIAMCINOLONE ACETONIDE 1 MG/G
CREAM TOPICAL
Qty: 30 G | Refills: 0 | Status: SHIPPED | OUTPATIENT
Start: 2019-11-20 | End: 2020-08-03

## 2019-11-20 RX ORDER — HYDROXYZINE HYDROCHLORIDE 25 MG/1
25 TABLET, FILM COATED ORAL EVERY 8 HOURS PRN
Qty: 30 TABLET | Refills: 0 | Status: SHIPPED | OUTPATIENT
Start: 2019-11-20 | End: 2019-11-30

## 2019-11-20 ASSESSMENT — ENCOUNTER SYMPTOMS: COLOR CHANGE: 1

## 2019-12-03 DIAGNOSIS — K58.9 IRRITABLE BOWEL SYNDROME WITHOUT DIARRHEA: ICD-10-CM

## 2019-12-03 RX ORDER — CHLORDIAZEPOXIDE HYDROCHLORIDE AND CLIDINIUM BROMIDE 5; 2.5 MG/1; MG/1
1 CAPSULE ORAL DAILY
Qty: 30 CAPSULE | Refills: 2 | Status: SHIPPED | OUTPATIENT
Start: 2019-12-03 | End: 2020-02-05 | Stop reason: SDUPTHER

## 2019-12-16 ENCOUNTER — OFFICE VISIT (OUTPATIENT)
Dept: PSYCHOLOGY | Age: 64
End: 2019-12-16
Payer: MEDICARE

## 2019-12-16 DIAGNOSIS — F33.0 MILD EPISODE OF RECURRENT MAJOR DEPRESSIVE DISORDER (HCC): ICD-10-CM

## 2019-12-16 DIAGNOSIS — F41.1 GENERALIZED ANXIETY DISORDER: Primary | Chronic | ICD-10-CM

## 2019-12-16 PROCEDURE — 90837 PSYTX W PT 60 MINUTES: CPT | Performed by: PSYCHOLOGIST

## 2019-12-17 ENCOUNTER — OFFICE VISIT (OUTPATIENT)
Dept: FAMILY MEDICINE CLINIC | Age: 64
End: 2019-12-17
Payer: MEDICARE

## 2019-12-17 VITALS
HEIGHT: 64 IN | WEIGHT: 142 LBS | SYSTOLIC BLOOD PRESSURE: 114 MMHG | DIASTOLIC BLOOD PRESSURE: 80 MMHG | HEART RATE: 80 BPM | BODY MASS INDEX: 24.24 KG/M2

## 2019-12-17 DIAGNOSIS — R35.0 INCREASED URINARY FREQUENCY: Primary | ICD-10-CM

## 2019-12-17 LAB
BILIRUBIN, POC: ABNORMAL
BLOOD URINE, POC: ABNORMAL
CLARITY, POC: CLEAR
COLOR, POC: YELLOW
GLUCOSE URINE, POC: ABNORMAL
KETONES, POC: ABNORMAL
LEUKOCYTE EST, POC: ABNORMAL
NITRITE, POC: ABNORMAL
PH, POC: 6
PROTEIN, POC: ABNORMAL
SPECIFIC GRAVITY, POC: 1.02
UROBILINOGEN, POC: 0.2

## 2019-12-17 PROCEDURE — 81003 URINALYSIS AUTO W/O SCOPE: CPT | Performed by: FAMILY MEDICINE

## 2019-12-17 PROCEDURE — 1090F PRES/ABSN URINE INCON ASSESS: CPT | Performed by: FAMILY MEDICINE

## 2019-12-17 PROCEDURE — 4040F PNEUMOC VAC/ADMIN/RCVD: CPT | Performed by: FAMILY MEDICINE

## 2019-12-17 PROCEDURE — 1123F ACP DISCUSS/DSCN MKR DOCD: CPT | Performed by: FAMILY MEDICINE

## 2019-12-17 PROCEDURE — 1036F TOBACCO NON-USER: CPT | Performed by: FAMILY MEDICINE

## 2019-12-17 PROCEDURE — G8482 FLU IMMUNIZE ORDER/ADMIN: HCPCS | Performed by: FAMILY MEDICINE

## 2019-12-17 PROCEDURE — 99213 OFFICE O/P EST LOW 20 MIN: CPT | Performed by: FAMILY MEDICINE

## 2019-12-17 PROCEDURE — 3017F COLORECTAL CA SCREEN DOC REV: CPT | Performed by: FAMILY MEDICINE

## 2019-12-17 PROCEDURE — G8427 DOCREV CUR MEDS BY ELIG CLIN: HCPCS | Performed by: FAMILY MEDICINE

## 2019-12-17 PROCEDURE — G8399 PT W/DXA RESULTS DOCUMENT: HCPCS | Performed by: FAMILY MEDICINE

## 2019-12-17 PROCEDURE — G8420 CALC BMI NORM PARAMETERS: HCPCS | Performed by: FAMILY MEDICINE

## 2019-12-17 PROCEDURE — G8598 ASA/ANTIPLAT THER USED: HCPCS | Performed by: FAMILY MEDICINE

## 2019-12-17 RX ORDER — TRIAMCINOLONE ACETONIDE 0.25 MG/G
CREAM TOPICAL
Qty: 15 G | Refills: 0 | Status: SHIPPED | OUTPATIENT
Start: 2019-12-17 | End: 2020-08-03

## 2019-12-17 RX ORDER — DOXYCYCLINE HYCLATE 100 MG
100 TABLET ORAL 2 TIMES DAILY
Qty: 6 TABLET | Refills: 0 | Status: SHIPPED | OUTPATIENT
Start: 2019-12-17 | End: 2019-12-20

## 2019-12-17 RX ORDER — HYDROXYZINE HYDROCHLORIDE 25 MG/1
25 TABLET, FILM COATED ORAL EVERY 8 HOURS PRN
COMMUNITY
End: 2020-02-05 | Stop reason: ALTCHOICE

## 2019-12-30 ENCOUNTER — HOSPITAL ENCOUNTER (EMERGENCY)
Age: 64
Discharge: HOME | End: 2019-12-30
Payer: MEDICARE

## 2019-12-30 VITALS
HEART RATE: 78 BPM | SYSTOLIC BLOOD PRESSURE: 140 MMHG | DIASTOLIC BLOOD PRESSURE: 63 MMHG | OXYGEN SATURATION: 97 % | WEIGHT: 138 LBS | RESPIRATION RATE: 16 BRPM | TEMPERATURE: 98.1 F | BODY MASS INDEX: 23.69 KG/M2

## 2019-12-30 LAB
BILIRUBIN URINE: NEGATIVE
BLOOD, URINE: NEGATIVE
CHARACTER, URINE: CLEAR
COLOR: ABNORMAL
GLUCOSE, URINE: NEGATIVE MG/DL
KETONES, URINE: NEGATIVE
LEUKOCYTES, UA: ABNORMAL
NITRATE, UA: NEGATIVE
PH UA: 6 (ref 5–9)
PROTEIN UA: NEGATIVE MG/DL
REFLEX TO URINE C & S: ABNORMAL
SPECIFIC GRAVITY UA: 1.02 (ref 1–1.03)
UROBILINOGEN, URINE: 0.2 EU/DL (ref 0–1)

## 2019-12-30 PROCEDURE — 87086 URINE CULTURE/COLONY COUNT: CPT

## 2019-12-30 PROCEDURE — 99214 OFFICE O/P EST MOD 30 MIN: CPT

## 2019-12-30 PROCEDURE — 87186 SC STD MICRODIL/AGAR DIL: CPT

## 2019-12-30 PROCEDURE — 87077 CULTURE AEROBIC IDENTIFY: CPT

## 2019-12-30 PROCEDURE — 99213 OFFICE O/P EST LOW 20 MIN: CPT | Performed by: NURSE PRACTITIONER

## 2019-12-30 PROCEDURE — 81003 URINALYSIS AUTO W/O SCOPE: CPT

## 2019-12-30 RX ORDER — CEFDINIR 300 MG/1
300 CAPSULE ORAL 2 TIMES DAILY
Qty: 20 CAPSULE | Refills: 0 | Status: SHIPPED | OUTPATIENT
Start: 2019-12-30 | End: 2020-01-09

## 2019-12-30 ASSESSMENT — PAIN DESCRIPTION - ORIENTATION: ORIENTATION: RIGHT;LEFT

## 2019-12-30 ASSESSMENT — PAIN DESCRIPTION - FREQUENCY: FREQUENCY: CONTINUOUS

## 2019-12-30 ASSESSMENT — PAIN DESCRIPTION - PAIN TYPE: TYPE: ACUTE PAIN

## 2019-12-30 ASSESSMENT — PAIN - FUNCTIONAL ASSESSMENT: PAIN_FUNCTIONAL_ASSESSMENT: PREVENTS OR INTERFERES SOME ACTIVE ACTIVITIES AND ADLS

## 2019-12-30 ASSESSMENT — PAIN DESCRIPTION - DESCRIPTORS: DESCRIPTORS: ACHING

## 2019-12-30 ASSESSMENT — PAIN DESCRIPTION - LOCATION: LOCATION: FLANK

## 2019-12-30 ASSESSMENT — PAIN SCALES - GENERAL: PAINLEVEL_OUTOF10: 9

## 2019-12-30 NOTE — ED PROVIDER NOTES
Influenza, Quadv, 6 mo and older, IM (Fluzone, Flulaval) 10/02/2018    Influenza, Quadv, IM, (6 mo and older Fluzone, Flulaval, Fluarix and 3 yrs and older Afluria) 12/28/2016, 11/17/2017    Influenza, Triv, inactivated, subunit, adjuvanted, IM (Fluad 65 yrs and older) 11/20/2019    Pneumococcal Conjugate 13-valent (Dorisann Peel) 01/15/2019    Tdap (Boostrix, Adacel) 08/12/2018       FAMILY HISTORY     Patient's family history includes Dementia in her brother; Depression in her father and sister; Diabetes in her mother and sister; Heart Disease in her father and mother; High Blood Pressure in her mother and sister; Neurofibromatosis in her brother, father, and sister; Other in her brother; Parkinsonism in her father. SOCIAL HISTORY     Patient  reports that she has never smoked. She has never used smokeless tobacco. She reports that she does not drink alcohol or use drugs. PHYSICAL EXAM     ED TRIAGE VITALS  BP: (!) 140/63, Temp: 98.1 °F (36.7 °C), Pulse: 78, Resp: 16, SpO2: 97 %,Estimated body mass index is 23.69 kg/m² as calculated from the following:    Height as of 12/17/19: 5' 4\" (1.626 m). Weight as of this encounter: 138 lb (62.6 kg). ,No LMP recorded. Patient has had a hysterectomy. Physical Exam  Constitutional:       General: She is not in acute distress. Appearance: Normal appearance. She is not ill-appearing, toxic-appearing or diaphoretic. Musculoskeletal: Normal range of motion. Skin:     General: Skin is warm. Neurological:      General: No focal deficit present. Mental Status: She is alert and oriented to person, place, and time.          DIAGNOSTIC RESULTS     Labs:  Results for orders placed or performed during the hospital encounter of 12/30/19   UA without Microscopic Reflex C&S   Result Value Ref Range    Glucose, Urine Negative NEGATIVE mg/dl    Bilirubin Urine Negative NEGATIVE    Ketones, Urine Negative NEGATIVE    Specific Gravity, UA 1.020 1.002 - 1.03    Blood, Urine Negative NEGATIVE    pH, UA 6.00 5.0 - 9.0    Protein, UA Negative NEGATIVE mg/dl    Urobilinogen, Urine 0.20 0.0 - 1.0 eu/dl    Nitrate, UA Negative NEGATIVE    LEUKOCYTES, UA Moderate (A) NEGATIVE    Color, UA Dark yellow (A) STRAW-YELL    Character, Urine Clear CLEAR-SL C    REFLEX TO URINE C & S INDICATED        IMAGING:    No orders to display     URGENT CARE COURSE:     Vitals:    12/30/19 1453   BP: (!) 140/63   Pulse: 78   Resp: 16   Temp: 98.1 °F (36.7 °C)   TempSrc: Oral   SpO2: 97%   Weight: 138 lb (62.6 kg)       Medications - No data to display         PROCEDURES:  None    FINAL IMPRESSION      1. Flank pain    2. Dysuria          DISPOSITION/ PLAN   Patient is discharged home with script for [facility] for suspected UTI. Urinalysis is negative for any nitrates however there is noted to be moderate leukocytes. Given history of chronic kidney disease will treat with antibiotics. Continue adequate fluid hydration. Culture will take approx 3 days. Recommend follow up with PCP within 3 days.         PATIENT REFERRED TO:  Makayla Tavarez MD  [facility] / [address] [place] [address]      DISCHARGE MEDICATIONS:  New Prescriptions    CEFDINIR (OMNICEF) 300 MG CAPSULE    Take 1 capsule by mouth 2 times daily for 10 days       Discontinued Medications    RANITIDINE (ZANTAC) 150 MG TABLET    Take 1 tablet by mouth 2 times daily       Current Discharge Medication List          CARLOS MANUEL Julio NP    (Please note that portions of this note were completed with a voice recognition program. Efforts were made to edit the dictations but occasionally words are mis-transcribed.)         CARLOS MANUEL Russell NP  12/31/19 5787

## 2019-12-30 NOTE — ED TRIAGE NOTES
Patient ambulated to rm 2, shuffling feet, patient by self,Patient c/o of bilat. Flank pain, lower abd. pain x 3 days, patient states stage 3 kidney failure. Sample obtained, labeled, taken to lab.

## 2019-12-31 ENCOUNTER — HOSPITAL ENCOUNTER (OUTPATIENT)
Age: 64
Discharge: HOME | End: 2019-12-31
Payer: MEDICARE

## 2019-12-31 DIAGNOSIS — N18.4 CHRONIC KIDNEY DISEASE, STAGE IV (SEVERE) (HCC): ICD-10-CM

## 2019-12-31 DIAGNOSIS — I10 ESSENTIAL HYPERTENSION: ICD-10-CM

## 2019-12-31 LAB
ANION GAP SERPL CALCULATED.3IONS-SCNC: 11 MEQ/L (ref 8–16)
BUN BLDV-MCNC: 27 MG/DL (ref 7–22)
CALCIUM SERPL-MCNC: 9.3 MG/DL (ref 8.5–10.5)
CHLORIDE BLD-SCNC: 106 MEQ/L (ref 98–111)
CO2: 27 MEQ/L (ref 23–33)
CREAT SERPL-MCNC: 1.5 MG/DL (ref 0.4–1.2)
GFR SERPL CREATININE-BSD FRML MDRD: 35 ML/MIN/1.73M2
GLUCOSE BLD-MCNC: 77 MG/DL (ref 70–108)
POTASSIUM SERPL-SCNC: 4.2 MEQ/L (ref 3.5–5.2)
SODIUM BLD-SCNC: 144 MEQ/L (ref 135–145)

## 2019-12-31 PROCEDURE — 36415 COLL VENOUS BLD VENIPUNCTURE: CPT

## 2019-12-31 PROCEDURE — 80048 BASIC METABOLIC PNL TOTAL CA: CPT

## 2020-01-01 LAB
ORGANISM: ABNORMAL
URINE CULTURE REFLEX: ABNORMAL

## 2020-01-02 ENCOUNTER — OFFICE VISIT (OUTPATIENT)
Dept: NEPHROLOGY | Age: 65
End: 2020-01-02
Payer: MEDICARE

## 2020-01-02 VITALS
DIASTOLIC BLOOD PRESSURE: 73 MMHG | OXYGEN SATURATION: 99 % | WEIGHT: 140.4 LBS | SYSTOLIC BLOOD PRESSURE: 137 MMHG | BODY MASS INDEX: 24.1 KG/M2 | HEART RATE: 73 BPM

## 2020-01-02 PROCEDURE — G8482 FLU IMMUNIZE ORDER/ADMIN: HCPCS | Performed by: INTERNAL MEDICINE

## 2020-01-02 PROCEDURE — 1123F ACP DISCUSS/DSCN MKR DOCD: CPT | Performed by: INTERNAL MEDICINE

## 2020-01-02 PROCEDURE — 1036F TOBACCO NON-USER: CPT | Performed by: INTERNAL MEDICINE

## 2020-01-02 PROCEDURE — G8399 PT W/DXA RESULTS DOCUMENT: HCPCS | Performed by: INTERNAL MEDICINE

## 2020-01-02 PROCEDURE — G8420 CALC BMI NORM PARAMETERS: HCPCS | Performed by: INTERNAL MEDICINE

## 2020-01-02 PROCEDURE — 4040F PNEUMOC VAC/ADMIN/RCVD: CPT | Performed by: INTERNAL MEDICINE

## 2020-01-02 PROCEDURE — G8427 DOCREV CUR MEDS BY ELIG CLIN: HCPCS | Performed by: INTERNAL MEDICINE

## 2020-01-02 PROCEDURE — 99213 OFFICE O/P EST LOW 20 MIN: CPT | Performed by: INTERNAL MEDICINE

## 2020-01-02 PROCEDURE — 3017F COLORECTAL CA SCREEN DOC REV: CPT | Performed by: INTERNAL MEDICINE

## 2020-01-02 PROCEDURE — 1090F PRES/ABSN URINE INCON ASSESS: CPT | Performed by: INTERNAL MEDICINE

## 2020-01-02 NOTE — PROGRESS NOTES
[facility]        Outpatient Follow-Up note         1/2/2020 11:47 AM    Patient Name:   Jair Lugo  YOB: 1955  Primary Care Physician:  Barb Ochoa MD      Chief Complaint / Reason for follow-up : Follow Up of CKD     Interval History :  Patient seen and examined by me. Feels well. No CP or SOB . Some back pain  On omnicef for UTI     Past History :  Past Medical History:   Diagnosis Date    Allergic rhinitis     Anxiety     CKD stage 4 due to type 2 diabetes mellitus ([address]     CVA (cerebral infarction)     Depression     Fibromyalgia     Headache(784.0)     Hyperlipidemia     Hypertension     Irritable bowel syndrome     Kidney failure     Type II or unspecified type diabetes mellitus without mention of complication, not stated as uncontrolled     Unspecified cerebral artery occlusion with cerebral infarction     Unspecified sleep apnea      Past Surgical History:   Procedure Laterality Date    CARPAL TUNNEL RELEASE Right     [facility]COPY  2012    [facility]    ENDOSCOPY, COLON, DIAGNOSTIC  unsure    EYE SURGERY      lasik    HEMORRHOID SURGERY  unsure    HYSTERECTOMY      total    NECK SURGERY  18  years ago    fusion    SINUS SURGERY  10 years ago    TUBAL LIGATION          Medications :     Outpatient Medications Marked as Taking for the 1/2/20 encounter (Office Visit) with Tavo Bella MD   Medication Sig Dispense Refill    cefdinir (OMNICEF) 300 MG capsule Take 1 capsule by mouth 2 times daily for 10 days 20 capsule 0    hydrOXYzine (ATARAX) 25 MG tablet Take 25 mg by mouth every 8 hours as needed for Itching      triamcinolone (KENALOG) 0.025 % cream Apply Topically 15 g 0    chlordiazePOXIDE-clidinium (LIBRAX) 5-2.5 MG per capsule Take 1 capsule by mouth daily. TAKE 1 CAPSULE TWICE A DAY AS NEEDED FOR PAIN 30 capsule 2    triamcinolone (KENALOG) 0.1 % cream Apply topically 2 times daily for up to 2 weeks.  30 g 0    traZODone (DESYREL) 50 MG tablet TAKE 1 TABLET NIGHTLY 90 tablet 0    MAGNESIUM CITRATE PO Take by mouth as needed (weekly)      amLODIPine (NORVASC) 10 MG tablet Take 1 tablet by mouth daily 90 tablet 1    OYSCO 500 + D 500-200 MG-UNIT per tablet TAKE 2 TABLETS BY MOUTH TWICE DAILY 180 tablet 3    simvastatin (ZOCOR) 20 MG tablet TAKE 1 TABLET DAILY 90 tablet 1    vitamin D (ERGOCALCIFEROL) 56155 units CAPS capsule Take 1 capsule by mouth once a week 12 capsule 1    clopidogrel (PLAVIX) 75 MG tablet TAKE 1 TABLET DAILY 90 tablet 1    buPROPion (WELLBUTRIN SR) 150 MG extended release tablet TAKE 1 TABLET TWICE A  tablet 1    lubiprostone (AMITIZA) 8 MCG CAPS capsule Take 24 mcg by mouth daily (with breakfast)       denosumab (PROLIA) 60 MG/ML SOLN SC injection Inject 60 mg into the skin once      omeprazole (PRILOSEC) 20 MG delayed release capsule TK 1 C PO QD 90 capsule 1    folic acid (FOLVITE) 170 MCG tablet Take 400 mcg by mouth daily      loratadine (CLARITIN) 10 MG tablet Take 1 tablet by mouth daily 30 tablet 2    b complex vitamins capsule Take 1 capsule by mouth daily 100 capsule 3    Probiotic Product (PROBIOTIC DAILY PO) Take  by mouth.  FISH OIL 1,000 mg by Does not apply route 2 times daily Indications: Decreased Energy       aspirin 81 MG tablet Take 81 mg by mouth daily.            Vitals     /73 (Site: Right Upper Arm, Position: Sitting, Cuff Size: Medium Adult)   Pulse 73   Wt 140 lb 6.4 oz (63.7 kg)   SpO2 99%   BMI 24.10 kg/m²  Wt Readings from Last 3 Encounters:   01/02/20 140 lb 6.4 oz (63.7 kg)   12/30/19 138 lb (62.6 kg)   12/17/19 142 lb (64.4 kg)        Physical Exam     General -- no distress  Lungs -- clear  Heart -- S1, S2 heard, JVD - no  Abdomen - soft, non-tender  Extremities -- no edema  CNS - awake and alert    Labs, Radiology and Tests    Labs -     1/18 1/18 7/18 12/18 2/19 8/19 12/19        Potassium 4.8  4.8  5.1  4.2  4.5  4.1  4.2     BUN 37  30  34  25  22  29  27       Creatinine 1.8  1.6  1.8  1.6  1.8  1.8  1.5       eGFR 28  32  28  32  28  28  35                             UPCR    < 200    < 200    100         UMCR    13        6                                 12/18 - calcium 9.4 phosphorus 3.2 vitamin D 19 and PTH 83  3/9 - calcium - 9.4     Renal Ultrasound Scan -- 1/2018  Rt kidney 9 cm/ left kidney 9 cm. Hypoplastic kidneys . Elevated resistive indices . 5 mm cyst rt kidney      Other : old  lab data and PCP notes have been reviewed and noted patient's creatinine was around 1.5 in 2016 and in 2014 it was 0.7 A CT scan which was done in 2014 which was negative for any abnormalities of the kidney.     Assessment    1. Renal - as per MDRD patient's GFR is around 35 ML per minute minute. Slightly better than her baseline.    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 2. Essential hypertension well controlled continue current medications. 3. Electrolytes - WNL  4. Diabetes mellitus - not on any meds   5. History of stroke in 2013   6. Severe osteoporosis- on prolia  7. UTI - on abx. Finish abx . Call if no improvement in symptoms. 8. Medications reviewed discussed with the patient and  in detail. 9. Follow-up in 6 months    Tests and orders placed this Encounter     Orders Placed This Encounter   Procedures    Basic Metabolic Panel    Urinalysis with Microscopic    Creatinine, Random Urine    MICROALBUMIN, RANDOM URINE (W/O CREATININE)    Protein, urine, random       Camila Duvall M.D  [facility].

## 2020-01-02 NOTE — PROGRESS NOTES
Back pain rated at a 9/10 across lower back  Urine dark, smelly and bubbly currently on Omnicef x 10 days

## 2020-01-08 ENCOUNTER — NURSE ONLY (OUTPATIENT)
Dept: LAB | Age: 65
End: 2020-01-08

## 2020-01-08 LAB
ANION GAP SERPL CALCULATED.3IONS-SCNC: 12 MEQ/L (ref 8–16)
BACTERIA: ABNORMAL
BILIRUBIN URINE: NEGATIVE
BLOOD, URINE: ABNORMAL
BUN BLDV-MCNC: 29 MG/DL (ref 7–22)
CALCIUM SERPL-MCNC: 9.2 MG/DL (ref 8.5–10.5)
CASTS: ABNORMAL /LPF
CASTS: ABNORMAL /LPF
CHARACTER, URINE: CLEAR
CHLORIDE BLD-SCNC: 106 MEQ/L (ref 98–111)
CO2: 27 MEQ/L (ref 23–33)
COLOR: YELLOW
CREAT SERPL-MCNC: 1.8 MG/DL (ref 0.4–1.2)
CREATININE URINE: 125.2 MG/DL
CRYSTALS: ABNORMAL
EPITHELIAL CELLS, UA: ABNORMAL /HPF
GFR SERPL CREATININE-BSD FRML MDRD: 28 ML/MIN/1.73M2
GLUCOSE BLD-MCNC: 99 MG/DL (ref 70–108)
GLUCOSE, URINE: NEGATIVE MG/DL
KETONES, URINE: NEGATIVE
LEUKOCYTE ESTERASE, URINE: ABNORMAL
MISCELLANEOUS LAB TEST RESULT: ABNORMAL
NITRITE, URINE: NEGATIVE
PH UA: 5 (ref 5–9)
POTASSIUM SERPL-SCNC: 4.6 MEQ/L (ref 3.5–5.2)
PROTEIN UA: NEGATIVE MG/DL
PROTEIN, URINE: 21.8 MG/DL
RBC URINE: ABNORMAL /HPF
RENAL EPITHELIAL, UA: ABNORMAL
SODIUM BLD-SCNC: 145 MEQ/L (ref 135–145)
SPECIFIC GRAVITY UA: 1.02 (ref 1–1.03)
UROBILINOGEN, URINE: 0.2 EU/DL (ref 0–1)
WBC UA: ABNORMAL /HPF
YEAST: ABNORMAL

## 2020-01-10 ENCOUNTER — TELEPHONE (OUTPATIENT)
Dept: NEPHROLOGY | Age: 65
End: 2020-01-10

## 2020-01-10 RX ORDER — NITROFURANTOIN 25; 75 MG/1; MG/1
100 CAPSULE ORAL 2 TIMES DAILY
Qty: 14 CAPSULE | Refills: 0 | Status: SHIPPED | OUTPATIENT
Start: 2020-01-10 | End: 2020-01-17

## 2020-01-10 NOTE — TELEPHONE ENCOUNTER
Reviewed the blood work blood work actually looks okay creatinine was at 1.8 and kidney function of her 28% which has been what it was in the past  Also the urine test looks like you may be having a urinary tract infection  Please prescribe Macrobid 100 mg twice daily for 7 days

## 2020-01-13 ENCOUNTER — OFFICE VISIT (OUTPATIENT)
Dept: PSYCHOLOGY | Age: 65
End: 2020-01-13
Payer: MEDICARE

## 2020-01-13 PROCEDURE — 90834 PSYTX W PT 45 MINUTES: CPT | Performed by: PSYCHOLOGIST

## 2020-01-20 ENCOUNTER — PATIENT MESSAGE (OUTPATIENT)
Dept: NEPHROLOGY | Age: 65
End: 2020-01-20

## 2020-01-21 ENCOUNTER — NURSE ONLY (OUTPATIENT)
Dept: LAB | Age: 65
End: 2020-01-21

## 2020-01-21 LAB
BACTERIA: ABNORMAL
BILIRUBIN URINE: NEGATIVE
BLOOD, URINE: NEGATIVE
CASTS: ABNORMAL /LPF
CASTS: ABNORMAL /LPF
CHARACTER, URINE: CLEAR
COLOR: YELLOW
CRYSTALS: ABNORMAL
EPITHELIAL CELLS, UA: ABNORMAL /HPF
GLUCOSE, URINE: NEGATIVE MG/DL
KETONES, URINE: NEGATIVE
LEUKOCYTE EST, POC: ABNORMAL
MISCELLANEOUS LAB TEST RESULT: ABNORMAL
NITRITE, URINE: NEGATIVE
PH UA: 5 (ref 5–9)
PROTEIN UA: NEGATIVE MG/DL
RBC URINE: ABNORMAL /HPF
RENAL EPITHELIAL, UA: ABNORMAL
SPECIFIC GRAVITY UA: 1.01 (ref 1–1.03)
UROBILINOGEN, URINE: 0.2 EU/DL (ref 0–1)
WBC UA: ABNORMAL /HPF
YEAST: ABNORMAL

## 2020-01-22 ENCOUNTER — TELEPHONE (OUTPATIENT)
Dept: NEPHROLOGY | Age: 65
End: 2020-01-22

## 2020-01-22 NOTE — TELEPHONE ENCOUNTER
Patient called. UA was completed with moderate Leukocytes. A culture ws not collected. Do you still want this. I put in a new order. The first order was clinic collect. I apologize. Please advise.

## 2020-02-03 ENCOUNTER — OFFICE VISIT (OUTPATIENT)
Dept: PSYCHOLOGY | Age: 65
End: 2020-02-03
Payer: MEDICARE

## 2020-02-03 PROCEDURE — 90834 PSYTX W PT 45 MINUTES: CPT | Performed by: PSYCHOLOGIST

## 2020-02-03 NOTE — PROGRESS NOTES
talkative   Thought Process:  within normal limits   Thought Content: Within Normal Limits   Cognition:  Normal   Memory: Normal   Insight:  good   Judgment: Normal   Suicidal Ideations: Denies Suicidal Ideations   Homicidal Ideations: Denies Homicidal Ideations   Medication Side Effects: absent       Attention Span Normal     Screenings Completed in This Encounter:                                      DIAGNOSIS AND ASSESSMENT DATA     DIAGNOSIS:  See visit diagnoses. 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 · Anxiety continues to be an issue, with reassurance-seeking her most common coping strategy. I want her to use cognitive reframing more freely. ASSESSMENT/PLAN   Follow-up:  No follow-ups on file. Resume good work at [employer]. Resume use of  IBS hypnosis recording (on phone)  Ask Slime Gama to intervene when it comes to your relationship with Ba Glover and Scott in the loop when it comes to finances, and let them support you. Has a very strong emotional/social support system, which is one of her great strengths. Needs to tap into that to work on the distress with her   Has re-started bright light therapy, using it regularly  I encouraged her to advocate for herself at her doctor's office (re: UTI concerns)    Homework assignment before next session:     [] Practice deep breathing 1-2 times per day for 15 minutes, as demonstrated in session, and/or:    [] Go to [employer] [facility]\" web site and begin practicing with their free meditation podcasts    [x] Track thoughts that you think feed anxiety or depression    [] Go to [employer] [facility] [employer]\" web site, open up the \"Workbooks\" tab, and select a problem from the list that best suits your needs. Review the first module. [x] Begin to track physical activity.  Even five minutes per day will be helpful.    [] Talk with your physician about whether it's OK to start fish oil (burpless) or flax oil, 1000 to 1200 mg per day    [] Most importantly, remember this guideline: \"Don't believe everything you think! \"   :)    [] Try \"Expressive Writing\" using the guidelines on the handout    [x] Start yoga class, as discussed. 1. Continue work at [employer]. Let the trainers tailor a program to your needs. 2. Continue enjoying time with friends on a regular basis. 3. Assert yourself when it comes to [employer] and the finances. 4. Practice daily relaxation training, again. 5. Start journaling  6. Re-start yoga for multiple physical issues  7. Continue to focus on improving quality of life, for now  8. Keep your phone with you at all times, so as to be able to call for help if you fall. 9. Regular social time  10. Recognize your strengths, which include facilitating connections between other people. 11. Restart bright light therapy once the weather gets gloomy  12. Continue depression support group attendance  15. Do more walking and social events  14. Reframe cognitions about marriage--how to make things work  15. Spend more time with the friends who make you laugh  12. Remind yourself that [place] and Priya Diaz will never let you starve or be homeless-- you have good supports and it's OK to rely on them as needed. See items under \"Plan,\" above. Session lasted 45 minutes.     Provider Signature:  Electronically signed by Cadence Haq, PhD on 2/3/2020 at 10:07 AM

## 2020-02-05 ENCOUNTER — OFFICE VISIT (OUTPATIENT)
Dept: FAMILY MEDICINE CLINIC | Age: 65
End: 2020-02-05
Payer: MEDICARE

## 2020-02-05 VITALS
HEART RATE: 72 BPM | HEIGHT: 64 IN | BODY MASS INDEX: 23.56 KG/M2 | WEIGHT: 138 LBS | SYSTOLIC BLOOD PRESSURE: 116 MMHG | DIASTOLIC BLOOD PRESSURE: 68 MMHG | OXYGEN SATURATION: 98 %

## 2020-02-05 PROCEDURE — 3017F COLORECTAL CA SCREEN DOC REV: CPT | Performed by: FAMILY MEDICINE

## 2020-02-05 PROCEDURE — G8427 DOCREV CUR MEDS BY ELIG CLIN: HCPCS | Performed by: FAMILY MEDICINE

## 2020-02-05 PROCEDURE — 4040F PNEUMOC VAC/ADMIN/RCVD: CPT | Performed by: FAMILY MEDICINE

## 2020-02-05 PROCEDURE — 1123F ACP DISCUSS/DSCN MKR DOCD: CPT | Performed by: FAMILY MEDICINE

## 2020-02-05 PROCEDURE — 99214 OFFICE O/P EST MOD 30 MIN: CPT | Performed by: FAMILY MEDICINE

## 2020-02-05 PROCEDURE — 1090F PRES/ABSN URINE INCON ASSESS: CPT | Performed by: FAMILY MEDICINE

## 2020-02-05 PROCEDURE — G8399 PT W/DXA RESULTS DOCUMENT: HCPCS | Performed by: FAMILY MEDICINE

## 2020-02-05 PROCEDURE — G8482 FLU IMMUNIZE ORDER/ADMIN: HCPCS | Performed by: FAMILY MEDICINE

## 2020-02-05 PROCEDURE — G8420 CALC BMI NORM PARAMETERS: HCPCS | Performed by: FAMILY MEDICINE

## 2020-02-05 PROCEDURE — 1036F TOBACCO NON-USER: CPT | Performed by: FAMILY MEDICINE

## 2020-02-05 RX ORDER — LIDOCAINE 50 MG/G
1 PATCH TOPICAL DAILY
Qty: 30 PATCH | Refills: 0 | Status: SHIPPED | OUTPATIENT
Start: 2020-02-05 | End: 2020-03-06

## 2020-02-05 RX ORDER — TRAZODONE HYDROCHLORIDE 50 MG/1
TABLET ORAL
Qty: 90 TABLET | Refills: 0 | Status: SHIPPED | OUTPATIENT
Start: 2020-02-05 | End: 2020-06-24 | Stop reason: SDUPTHER

## 2020-02-05 RX ORDER — CLOPIDOGREL BISULFATE 75 MG/1
TABLET ORAL
Qty: 90 TABLET | Refills: 1 | Status: SHIPPED | OUTPATIENT
Start: 2020-02-05 | End: 2020-08-10 | Stop reason: SDUPTHER

## 2020-02-05 RX ORDER — CHLORDIAZEPOXIDE HYDROCHLORIDE AND CLIDINIUM BROMIDE 5; 2.5 MG/1; MG/1
1 CAPSULE ORAL DAILY
Qty: 30 CAPSULE | Refills: 2 | Status: SHIPPED | OUTPATIENT
Start: 2020-02-05 | End: 2020-06-26 | Stop reason: SDUPTHER

## 2020-02-05 RX ORDER — SIMVASTATIN 20 MG
TABLET ORAL
Qty: 90 TABLET | Refills: 1 | Status: SHIPPED | OUTPATIENT
Start: 2020-02-05 | End: 2020-08-10 | Stop reason: SDUPTHER

## 2020-02-05 RX ORDER — ERGOCALCIFEROL 1.25 MG/1
50000 CAPSULE ORAL WEEKLY
Qty: 12 CAPSULE | Refills: 1 | Status: SHIPPED | OUTPATIENT
Start: 2020-02-05 | End: 2020-08-10 | Stop reason: SDUPTHER

## 2020-02-05 RX ORDER — BUPROPION HYDROCHLORIDE 150 MG/1
TABLET, EXTENDED RELEASE ORAL
Qty: 180 TABLET | Refills: 1 | Status: SHIPPED | OUTPATIENT
Start: 2020-02-05 | End: 2020-09-11 | Stop reason: SDUPTHER

## 2020-02-05 ASSESSMENT — ENCOUNTER SYMPTOMS
EYE DISCHARGE: 0
NAUSEA: 0
SORE THROAT: 0
ABDOMINAL PAIN: 1
VOMITING: 0
BACK PAIN: 1
SHORTNESS OF BREATH: 0
RHINORRHEA: 0
COUGH: 0
COLOR CHANGE: 1
DIARRHEA: 1
EYE REDNESS: 0

## 2020-02-05 NOTE — PROGRESS NOTES
patch, Rfl: 0    triamcinolone (KENALOG) 0.025 % cream, Apply Topically, Disp: 15 g, Rfl: 0    triamcinolone (KENALOG) 0.1 % cream, Apply topically 2 times daily for up to 2 weeks. , Disp: 30 g, Rfl: 0    MAGNESIUM CITRATE PO, Take by mouth as needed (weekly), Disp: , Rfl:     OYSCO 500 + D 500-200 MG-UNIT per tablet, TAKE 2 TABLETS BY MOUTH TWICE DAILY, Disp: 180 tablet, Rfl: 3    denosumab (PROLIA) 60 MG/ML SOLN SC injection, Inject 60 mg into the skin once, Disp: , Rfl:     folic acid (FOLVITE) 299 MCG tablet, Take 400 mcg by mouth daily, Disp: , Rfl:     loratadine (CLARITIN) 10 MG tablet, Take 1 tablet by mouth daily, Disp: 30 tablet, Rfl: 2    b complex vitamins capsule, Take 1 capsule by mouth daily, Disp: 100 capsule, Rfl: 3    FISH OIL, 1,000 mg by Does not apply route 2 times daily Indications: Decreased Energy , Disp: , Rfl:     aspirin 81 MG tablet, Take 81 mg by mouth daily. , Disp: , Rfl:     Allergies   Allergen Reactions    Actos [Pioglitazone] Nausea And Vomiting    Augmentin [Amoxicillin-Pot Clavulanate] Diarrhea    Ciprofloxacin      unsure    Sulfa Antibiotics Rash       Subjective:      Review of Systems   Constitutional: Negative for chills and fever. HENT: Negative for rhinorrhea and sore throat. Eyes: Negative for discharge and redness. Respiratory: Negative for cough and shortness of breath. Cardiovascular: Negative for chest pain and palpitations. Gastrointestinal: Positive for abdominal pain and diarrhea. Negative for nausea and vomiting. Genitourinary: Negative for dysuria and frequency. Musculoskeletal: Positive for arthralgias, back pain and myalgias. Skin: Positive for color change. Negative for rash. Neurological: Negative for weakness and headaches. Hematological: Negative for adenopathy. Does not bruise/bleed easily. Psychiatric/Behavioral: Negative for decreased concentration and dysphoric mood.        Objective:     /68 (Site: Left Upper Arm, Position: Sitting, Cuff Size: Large Adult)   Pulse 72   Ht 5' 4\" (1.626 m)   Wt 138 lb (62.6 kg)   SpO2 98%   BMI 23.69 kg/m²     Physical Exam  Vitals signs and nursing note reviewed. Constitutional:       General: She is not in acute distress. Appearance: She is well-developed. HENT:      Head: Normocephalic and atraumatic. Nose: Nose normal.   Eyes:      Conjunctiva/sclera: Conjunctivae normal.   Neck:      Musculoskeletal: Normal range of motion and neck supple. Cardiovascular:      Rate and Rhythm: Normal rate and regular rhythm. Heart sounds: Normal heart sounds. Pulmonary:      Effort: Pulmonary effort is normal. No respiratory distress. Breath sounds: Normal breath sounds. No wheezing. Musculoskeletal:      Thoracic back: She exhibits tenderness. She exhibits normal range of motion, no bony tenderness and no spasm. Lumbar back: She exhibits tenderness. She exhibits normal range of motion, no bony tenderness and no spasm. Skin:     General: Skin is warm and dry. Findings: No erythema or rash. Neurological:      Mental Status: She is alert and oriented to person, place, and time. Psychiatric:         Behavior: Behavior normal.       Assessment/Plan: Conrad Houston was seen today for medication refill. Diagnoses and all orders for this visit:    Skin lesion of face        -     The skin lesions are enlarging and she has a family history of skin cancer in her sister so a referral was given to dermatology for further evaluation and treatment. Irritable bowel syndrome without diarrhea  -     chlordiazePOXIDE-clidinium (LIBRAX) 5-2.5 MG per capsule; Take 1 capsule by mouth daily. TAKE 1 CAPSULE TWICE A DAY AS NEEDED FOR PAIN    Chronic midline thoracic back pain  -     lidocaine (LIDODERM) 5 %; Place 1 patch onto the skin daily 12 hours on, 12 hours off.     Psychophysiological insomnia  -     traZODone (DESYREL) 50 MG tablet; TAKE 1 TABLET NIGHTLY    Pure

## 2020-02-10 ENCOUNTER — OFFICE VISIT (OUTPATIENT)
Dept: PSYCHOLOGY | Age: 65
End: 2020-02-10
Payer: MEDICARE

## 2020-02-10 PROCEDURE — 90837 PSYTX W PT 60 MINUTES: CPT | Performed by: PSYCHOLOGIST

## 2020-02-10 NOTE — PROGRESS NOTES
Within Normal Limits   Cognition:  Normal   Memory: Normal   Insight:  good   Judgment: Normal   Suicidal Ideations: Denies Suicidal Ideations   Homicidal Ideations: Denies Homicidal Ideations   Medication Side Effects: absent       Attention Span Normal     Screenings Completed in This Encounter:                                      DIAGNOSIS AND ASSESSMENT DATA     DIAGNOSIS:  See visit diagnoses. 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 · Anxiety continues to be an issue, with reassurance-seeking her most common coping strategy. I want her to use cognitive reframing more freely. · I wrote out a card with the best strategies for her in letting go of things she can't control. ASSESSMENT/PLAN   Follow-up:  No follow-ups on file. Resume good work at [employer]. Resume use of  IBS hypnosis recording (on phone)  Ask Wilber Velarde to intervene when it comes to your relationship with Amaya Caruso and Scott in the loop when it comes to finances, and let them support you. Has a very strong emotional/social support system, which is one of her great strengths. Needs to tap into that to work on the distress with her   Has re-started bright light therapy, using it regularly  I encouraged her to advocate for herself at her doctor's office (re: UTI concerns)    Homework assignment before next session:     [] Practice deep breathing 1-2 times per day for 15 minutes, as demonstrated in session, and/or:    [] Go to [employer] [facility]\" web site and begin practicing with their free meditation podcasts    [x] Track thoughts that you think feed anxiety or depression    [] Go to [employer] [facility] [employer]\" web site, open up the \"Workbooks\" tab, and select a problem from the list that best suits your needs. Review the first module. [x] Begin to track physical activity.  Even five

## 2020-02-24 RX ORDER — RANITIDINE 150 MG/1
150 TABLET ORAL 2 TIMES DAILY
Qty: 180 TABLET | Refills: 1 | Status: CANCELLED | OUTPATIENT
Start: 2020-02-24

## 2020-02-25 RX ORDER — FAMOTIDINE 20 MG/1
20 TABLET, FILM COATED ORAL 2 TIMES DAILY
Qty: 60 TABLET | Refills: 3 | Status: SHIPPED | OUTPATIENT
Start: 2020-02-25 | End: 2020-09-25

## 2020-02-25 NOTE — TELEPHONE ENCOUNTER
Carlos Ganesh called in for a refill of the ranitidine, but is concerned about taking Ranitidine as she was reading the info on it and it is scaring her. Would you be able to send an alternative in to Savana Herrera for her. We are to call her with the answer.

## 2020-03-16 ENCOUNTER — OFFICE VISIT (OUTPATIENT)
Dept: PSYCHOLOGY | Age: 65
End: 2020-03-16
Payer: MEDICARE

## 2020-03-16 PROCEDURE — 90834 PSYTX W PT 45 MINUTES: CPT | Performed by: PSYCHOLOGIST

## 2020-04-13 ENCOUNTER — VIRTUAL VISIT (OUTPATIENT)
Dept: PSYCHOLOGY | Age: 65
End: 2020-04-13
Payer: MEDICARE

## 2020-04-13 PROCEDURE — 90834 PSYTX W PT 45 MINUTES: CPT | Performed by: PSYCHOLOGIST

## 2020-05-11 ENCOUNTER — TELEMEDICINE (OUTPATIENT)
Dept: PSYCHOLOGY | Age: 65
End: 2020-05-11
Payer: MEDICARE

## 2020-05-11 PROCEDURE — 90837 PSYTX W PT 60 MINUTES: CPT | Performed by: PSYCHOLOGIST

## 2020-05-13 NOTE — PROGRESS NOTES
vitamin D (ERGOCALCIFEROL) 1.25 MG (24482 UT) CAPS capsule Take 1 capsule by mouth once a week  Brianna Bond MD   clopidogrel (PLAVIX) 75 MG tablet TAKE 1 TABLET DAILY  Brianna Bond MD   buPROPion [facility] SR) 150 MG extended release tablet TAKE 1 TABLET TWICE A DAY  Brianna Bond MD   triamcinolone (KENALOG) 0.025 % cream Apply Topically  Fady Lopez MD   triamcinolone (KENALOG) 0.1 % cream Apply topically 2 times daily for up to 2 weeks. Brianna Bond MD   MAGNESIUM CITRATE PO Take by mouth as needed (weekly)  Historical Provider, MD   OYSCO 500 + D 500-200 MG-UNIT per tablet TAKE 2 TABLETS BY MOUTH TWICE DAILY  Tess Nicolas MD   denosumab (PROLIA) 60 MG/ML SOLN SC injection Inject 60 mg into the skin once  Historical Provider, MD   folic acid (FOLVITE) 024 MCG tablet Take 400 mcg by mouth daily  Historical Provider, MD   loratadine (CLARITIN) 10 MG tablet Take 1 tablet by mouth daily  Renetta Jones MD   b complex vitamins capsule Take 1 capsule by mouth daily  Leigh Ann Krishnan MD   FISH OIL 1,000 mg by Does not apply route 2 times daily Indications: Decreased Energy   Historical Provider, MD   aspirin 81 MG tablet Take 81 mg by mouth daily.     Historical Provider, MD       Social History     Tobacco Use    Smoking status: Never Smoker    Smokeless tobacco: Never Used   Substance Use Topics    Alcohol use: No     Alcohol/week: 0.0 standard drinks    Drug use: No        Allergies   Allergen Reactions    Actos [Pioglitazone] Nausea And Vomiting    Augmentin [Amoxicillin-Pot Clavulanate] Diarrhea    Ciprofloxacin      unsure    Sulfa Antibiotics Rash   ,   Past Medical History:   Diagnosis Date    Allergic rhinitis     Anxiety     CKD stage 4 due to type 2 diabetes mellitus ([facility] [address]     CVA (cerebral infarction)     Depression     Fibromyalgia     Headache(784.0)     Hyperlipidemia     Hypertension     Irritable bowel syndrome     Kidney failure     Type II or unspecified type diabetes mellitus without mention of complication, not stated as uncontrolled     Unspecified cerebral artery occlusion with cerebral infarction     Unspecified sleep apnea        PHYSICAL EXAMINATION:    Constitutional: [x] Appears well-developed and well-nourished [x] No apparent distress      [] Abnormal-   Mental status  [x] Alert and awake  [x] Oriented to person/place/time []Able to follow commands      Eyes:  EOM    [x]  Normal  [] Abnormal-  Sclera  [x]  Normal  [] Abnormal -         Discharge [x]  None visible  [] Abnormal -    HENT:   [x] Normocephalic, atraumatic. [] Abnormal   [x] Mouth/Throat: Mucous membranes are moist.     External Ears [] Normal  [] Abnormal-     Neck: [] No visualized mass     Pulmonary/Chest: [x] Respiratory effort normal.  [x] No visualized signs of difficulty breathing or respiratory distress        [] Abnormal-      Musculoskeletal:   [] Normal gait with no signs of ataxia         [] Normal range of motion of neck        [] Abnormal-       Neurological:        [x] No Facial Asymmetry (Cranial nerve 7 motor function) (limited exam to video visit)          [x] No gaze palsy        [] Abnormal-         Skin:        [x] No significant exanthematous lesions or discoloration noted on facial skin         [] Abnormal-            Psychiatric:       [] Normal Affect [] No Hallucinations        [] Abnormal-       ASSESSMENT/PLAN:  1. Acute bacterial sinusitis  - Nahomy Chicas may have sinusitis from drainage from her allergies so will treat with antibiotics, rest, and sinus medication. - azithromycin (ZITHROMAX Z-NORBERTO) 250 MG tablet; Take two (2) tablets by mouth on day 1, then take one (1) tablet by mouth daily for four (4) days. Dispense: 6 tablet; Refill: 0  - brompheniramine-pseudoephedrine-DM (BROMFED DM) 2-30-10 MG/5ML syrup; Take 5 mLs by mouth 4 times daily as needed for Congestion or Cough  Dispense: 140 mL; Refill: 0    2.  Seasonal allergies  - Continue with

## 2020-05-14 ENCOUNTER — TELEPHONE (OUTPATIENT)
Dept: FAMILY MEDICINE CLINIC | Age: 65
End: 2020-05-14

## 2020-05-14 ENCOUNTER — TELEMEDICINE (OUTPATIENT)
Dept: FAMILY MEDICINE CLINIC | Age: 65
End: 2020-05-14
Payer: MEDICARE

## 2020-05-14 PROCEDURE — G8420 CALC BMI NORM PARAMETERS: HCPCS | Performed by: FAMILY MEDICINE

## 2020-05-14 PROCEDURE — 99213 OFFICE O/P EST LOW 20 MIN: CPT | Performed by: FAMILY MEDICINE

## 2020-05-14 PROCEDURE — 3017F COLORECTAL CA SCREEN DOC REV: CPT | Performed by: FAMILY MEDICINE

## 2020-05-14 PROCEDURE — 4040F PNEUMOC VAC/ADMIN/RCVD: CPT | Performed by: FAMILY MEDICINE

## 2020-05-14 PROCEDURE — G8399 PT W/DXA RESULTS DOCUMENT: HCPCS | Performed by: FAMILY MEDICINE

## 2020-05-14 PROCEDURE — G8428 CUR MEDS NOT DOCUMENT: HCPCS | Performed by: FAMILY MEDICINE

## 2020-05-14 PROCEDURE — 1090F PRES/ABSN URINE INCON ASSESS: CPT | Performed by: FAMILY MEDICINE

## 2020-05-14 PROCEDURE — 1123F ACP DISCUSS/DSCN MKR DOCD: CPT | Performed by: FAMILY MEDICINE

## 2020-05-14 PROCEDURE — 1036F TOBACCO NON-USER: CPT | Performed by: FAMILY MEDICINE

## 2020-05-14 RX ORDER — AZITHROMYCIN 250 MG/1
TABLET, FILM COATED ORAL
Qty: 6 TABLET | Refills: 0 | Status: SHIPPED | OUTPATIENT
Start: 2020-05-14 | End: 2020-06-17 | Stop reason: ALTCHOICE

## 2020-05-14 RX ORDER — BROMPHENIRAMINE MALEATE, PSEUDOEPHEDRINE HYDROCHLORIDE, AND DEXTROMETHORPHAN HYDROBROMIDE 2; 30; 10 MG/5ML; MG/5ML; MG/5ML
5 SYRUP ORAL 4 TIMES DAILY PRN
Qty: 140 ML | Refills: 0 | Status: SHIPPED | OUTPATIENT
Start: 2020-05-14 | End: 2020-05-21

## 2020-05-14 ASSESSMENT — ENCOUNTER SYMPTOMS
RHINORRHEA: 1
EYE ITCHING: 1
SHORTNESS OF BREATH: 0
COUGH: 1

## 2020-05-14 NOTE — TELEPHONE ENCOUNTER
Pt states she is having trouble logging in for the VV through [facility] today. I offered VV through doxy. me but she declined. Pt is wondering if she can have a phone call instead? Please ale.

## 2020-05-19 ENCOUNTER — PATIENT MESSAGE (OUTPATIENT)
Dept: FAMILY MEDICINE CLINIC | Age: 65
End: 2020-05-19

## 2020-05-22 ENCOUNTER — TELEPHONE (OUTPATIENT)
Dept: RHEUMATOLOGY | Age: 65
End: 2020-05-22

## 2020-05-22 ENCOUNTER — TELEPHONE (OUTPATIENT)
Dept: NEPHROLOGY | Age: 65
End: 2020-05-22

## 2020-05-22 ENCOUNTER — HOSPITAL ENCOUNTER (EMERGENCY)
Age: 65
Discharge: HOME | End: 2020-05-22
Payer: MEDICARE

## 2020-05-22 VITALS
TEMPERATURE: 97.3 F | OXYGEN SATURATION: 94 % | WEIGHT: 130 LBS | RESPIRATION RATE: 14 BRPM | HEIGHT: 60 IN | BODY MASS INDEX: 25.52 KG/M2 | DIASTOLIC BLOOD PRESSURE: 62 MMHG | HEART RATE: 103 BPM | SYSTOLIC BLOOD PRESSURE: 132 MMHG

## 2020-05-22 LAB
BILIRUBIN URINE: NEGATIVE
BLOOD, URINE: NEGATIVE
CHARACTER, URINE: CLEAR
COLOR: YELLOW
GLUCOSE URINE: NEGATIVE MG/DL
KETONES, URINE: NEGATIVE
LEUKOCYTE ESTERASE, URINE: ABNORMAL
NITRITE, URINE: POSITIVE
PH UA: 5.5 (ref 5–9)
PROTEIN UA: NEGATIVE MG/DL
SPECIFIC GRAVITY UA: 1.02 (ref 1–1.03)
UROBILINOGEN, URINE: 0.2 EU/DL (ref 0.2–1)

## 2020-05-22 PROCEDURE — 87086 URINE CULTURE/COLONY COUNT: CPT

## 2020-05-22 PROCEDURE — 81003 URINALYSIS AUTO W/O SCOPE: CPT

## 2020-05-22 PROCEDURE — 87077 CULTURE AEROBIC IDENTIFY: CPT

## 2020-05-22 PROCEDURE — 87186 SC STD MICRODIL/AGAR DIL: CPT

## 2020-05-22 PROCEDURE — 99213 OFFICE O/P EST LOW 20 MIN: CPT

## 2020-05-22 PROCEDURE — 99213 OFFICE O/P EST LOW 20 MIN: CPT | Performed by: NURSE PRACTITIONER

## 2020-05-22 RX ORDER — NITROFURANTOIN 25; 75 MG/1; MG/1
100 CAPSULE ORAL 2 TIMES DAILY
Qty: 10 CAPSULE | Refills: 0 | Status: SHIPPED | OUTPATIENT
Start: 2020-05-22 | End: 2020-05-26

## 2020-05-22 RX ORDER — EPINEPHRINE 1 MG/ML
0.3 INJECTION, SOLUTION, CONCENTRATE INTRAVENOUS PRN
Status: CANCELLED | OUTPATIENT
Start: 2020-05-22

## 2020-05-22 RX ORDER — DIPHENHYDRAMINE HYDROCHLORIDE 50 MG/ML
50 INJECTION INTRAMUSCULAR; INTRAVENOUS ONCE
Status: CANCELLED | OUTPATIENT
Start: 2020-05-22

## 2020-05-22 RX ORDER — SODIUM CHLORIDE 9 MG/ML
INJECTION, SOLUTION INTRAVENOUS CONTINUOUS
Status: CANCELLED | OUTPATIENT
Start: 2020-05-22

## 2020-05-22 RX ORDER — METHYLPREDNISOLONE SODIUM SUCCINATE 125 MG/2ML
125 INJECTION, POWDER, LYOPHILIZED, FOR SOLUTION INTRAMUSCULAR; INTRAVENOUS ONCE
Status: CANCELLED | OUTPATIENT
Start: 2020-05-22

## 2020-05-22 ASSESSMENT — ENCOUNTER SYMPTOMS
CHEST TIGHTNESS: 0
VOMITING: 0
DIARRHEA: 0
NAUSEA: 0
SHORTNESS OF BREATH: 0
ABDOMINAL PAIN: 0

## 2020-05-22 ASSESSMENT — PAIN DESCRIPTION - ORIENTATION: ORIENTATION: LEFT;RIGHT;LOWER

## 2020-05-22 ASSESSMENT — PAIN SCALES - GENERAL: PAINLEVEL_OUTOF10: 7

## 2020-05-22 ASSESSMENT — PAIN DESCRIPTION - LOCATION: LOCATION: BACK

## 2020-05-22 NOTE — ED PROVIDER NOTES
[address]       Chief Complaint   Patient presents with    Back Pain     c/olower back pain and dark colored urine \" I think I have UTI\"       Nurses Notes reviewed and I agree except as noted in the HPI. HISTORY OF PRESENT ILLNESS   Guillermo Pulido is a 75 y.o. female who presents for evaluation of a UTI. Patient states that she has lower back pain and dark urine. Symptoms started about a week ago. Patient states that she has been drinking 4 bottles of water daily as directed by her kidney doctor. Patient states that she recently finished a Z-John for sinus infection. Patient/patient representative denies any foreign or domestic travel in the last 30 days. Patient /patient representative denies exposure to those with similar symptoms. Patient/patient representative denies contact with someone who was confirmed or suspected to have Coronavirus / COVID-19 within the last month. REVIEW OF SYSTEMS     Review of Systems   Constitutional: Negative for chills and fever. Respiratory: Negative for chest tightness and shortness of breath. Cardiovascular: Negative for chest pain. Gastrointestinal: Negative for abdominal pain, diarrhea, nausea and vomiting. Genitourinary: Positive for dysuria, frequency and urgency. Negative for hematuria. Dark urine   Skin: Negative for rash. Allergic/Immunologic: Negative for environmental allergies and food allergies. Neurological: Negative for headaches.        PAST MEDICAL HISTORY         Diagnosis Date    Allergic rhinitis     Anxiety     CKD stage 4 due to type 2 diabetes mellitus ([address]     CVA (cerebral infarction)     Depression     Fibromyalgia     Headache(784.0)     Hyperlipidemia     Hypertension     Irritable bowel syndrome     Kidney failure     Type II or unspecified type diabetes mellitus without mention of complication, not stated as uncontrolled     Unspecified cerebral artery occlusion with cerebral infarction     Unspecified sleep apnea        SURGICAL HISTORY     Patient  has a past surgical history that includes sinus surgery (10 years ago); Hemorrhoid surgery (unsure); Hysterectomy; Neck surgery (18  years ago); eye surgery; Endoscopy, colon, diagnostic (unsure); Colonoscopy (2012); Tubal ligation; and Carpal tunnel release (Right). CURRENT MEDICATIONS       Discharge Medication List as of 5/22/2020  4:30 PM      CONTINUE these medications which have NOT CHANGED    Details   Triamcinolone Acetonide (NASACORT AQ NA) by Nasal routeHistorical Med      famotidine (PEPCID) 20 MG tablet Take 1 tablet by mouth 2 times daily, Disp-60 tablet, R-3Normal      linaclotide (LINZESS) 145 MCG capsule Take 145 mcg by mouth every morning (before breakfast)Historical Med      chlordiazePOXIDE-clidinium (LIBRAX) 5-2.5 MG per capsule Take 1 capsule by mouth daily. TAKE 1 CAPSULE TWICE A DAY AS NEEDED FOR PAIN, Disp-30 capsule, R-2Normal      traZODone (DESYREL) 50 MG tablet TAKE 1 TABLET NIGHTLY, Disp-90 tablet, R-0PLEASE CONTACT PATIENT WHEN RX IS READY TO BE PICKED UP. Normal      simvastatin (ZOCOR) 20 MG tablet TAKE 1 TABLET DAILY, Disp-90 tablet, R-1Normal      vitamin D (ERGOCALCIFEROL) 1.25 MG (44667 UT) CAPS capsule Take 1 capsule by mouth once a week, Disp-12 capsule, R-1Normal      clopidogrel (PLAVIX) 75 MG tablet TAKE 1 TABLET DAILY, Disp-90 tablet, R-1Normal      buPROPion (WELLBUTRIN SR) 150 MG extended release tablet TAKE 1 TABLET TWICE A DAY, Disp-180 tablet, R-1Normal      MAGNESIUM CITRATE PO Take by mouth as needed (weekly)Historical Med      OYSCO 500 + D 500-200 MG-UNIT per tablet TAKE 2 TABLETS BY MOUTH TWICE DAILY, Disp-180 tablet, [id number]      folic acid (FOLVITE) 623 MCG tablet Take 400 mcg by mouth dailyHistorical Med      loratadine (CLARITIN) 10 MG tablet Take 1 tablet by mouth daily, Disp-30 tablet, R-2Normal      b complex vitamins capsule Take 1 [address] [address] [phone]    as needed, If symptoms change/worsen, go to the [address] and 24 hour hotline  Please call the community call center at [phone] should you feel you have COVID 19-like symptoms for further evalaution and instructions. After hours, please call   [phone] COVID-19 hotline number  [phone].           Cade Paulson, APRN - CNP         Cade Paulson, APRN - CNP  05/22/20 9101

## 2020-05-24 ENCOUNTER — PATIENT MESSAGE (OUTPATIENT)
Dept: NEPHROLOGY | Age: 65
End: 2020-05-24

## 2020-05-24 LAB
ORGANISM: ABNORMAL
URINE CULTURE, ROUTINE: ABNORMAL

## 2020-05-26 ENCOUNTER — TELEPHONE (OUTPATIENT)
Dept: FAMILY MEDICINE CLINIC | Age: 65
End: 2020-05-26

## 2020-05-26 RX ORDER — CEFDINIR 300 MG/1
300 CAPSULE ORAL 2 TIMES DAILY
Qty: 14 CAPSULE | Refills: 0 | Status: SHIPPED | OUTPATIENT
Start: 2020-05-26 | End: 2020-06-02

## 2020-05-26 NOTE — TELEPHONE ENCOUNTER
From: Yecenia Friend  To: Santi Rasmussen MD  Sent: 5/24/2020 9:27 PM EDT  Subject: Test Results Question    I called Friday but they were closing. I told the last that answerd I thought I had kidney infection. She said [facility] They said I had a infection and have me a prescription. They called today and said I have EColi. To get a hold of my doctor but didn't know witch one to talk to about it you or my family doctor. I do have stage 4. Thank you.  She gave [employer]

## 2020-05-27 ENCOUNTER — HOSPITAL ENCOUNTER (OUTPATIENT)
Dept: NURSING | Age: 65
End: 2020-05-27
Payer: MEDICARE

## 2020-05-28 ENCOUNTER — HOSPITAL ENCOUNTER (OUTPATIENT)
Dept: CT IMAGING | Age: 65
Discharge: HOME | End: 2020-05-28
Payer: MEDICARE

## 2020-05-28 ENCOUNTER — TELEPHONE (OUTPATIENT)
Dept: FAMILY MEDICINE CLINIC | Age: 65
End: 2020-05-28

## 2020-05-28 PROCEDURE — 70486 CT MAXILLOFACIAL W/O DYE: CPT

## 2020-06-05 ENCOUNTER — HOSPITAL ENCOUNTER (OUTPATIENT)
Age: 65
Discharge: HOME | End: 2020-06-05
Payer: MEDICARE

## 2020-06-05 DIAGNOSIS — R39.9 URINARY TRACT INFECTION SYMPTOMS: ICD-10-CM

## 2020-06-05 LAB
BACTERIA: ABNORMAL
BILIRUBIN URINE: ABNORMAL
BLOOD, URINE: NEGATIVE
CASTS: ABNORMAL /LPF
CASTS: ABNORMAL /LPF
CHARACTER, URINE: CLEAR
COLOR: ABNORMAL
CRYSTALS: ABNORMAL
EPITHELIAL CELLS, UA: ABNORMAL /HPF
GLUCOSE, URINE: NEGATIVE MG/DL
ICTOTEST: NEGATIVE
KETONES, URINE: ABNORMAL
LEUKOCYTE ESTERASE, URINE: ABNORMAL
MISCELLANEOUS LAB TEST RESULT: ABNORMAL
NITRITE, URINE: NEGATIVE
PH UA: 5 (ref 5–9)
PROTEIN UA: ABNORMAL MG/DL
RBC URINE: ABNORMAL /HPF
RENAL EPITHELIAL, UA: ABNORMAL
SPECIFIC GRAVITY UA: 1.02 (ref 1–1.03)
UROBILINOGEN, URINE: 1 EU/DL (ref 0–1)
WBC UA: ABNORMAL /HPF
YEAST: ABNORMAL

## 2020-06-05 PROCEDURE — 81001 URINALYSIS AUTO W/SCOPE: CPT

## 2020-06-05 RX ORDER — PREDNISONE 20 MG/1
40 TABLET ORAL DAILY
Qty: 10 TABLET | Refills: 0 | Status: SHIPPED | OUTPATIENT
Start: 2020-06-05 | End: 2020-06-17 | Stop reason: ALTCHOICE

## 2020-06-08 ENCOUNTER — TELEPHONE (OUTPATIENT)
Dept: NEPHROLOGY | Age: 65
End: 2020-06-08

## 2020-06-09 ENCOUNTER — HOSPITAL ENCOUNTER (OUTPATIENT)
Age: 65
Discharge: HOME | End: 2020-06-09
Payer: MEDICARE

## 2020-06-09 DIAGNOSIS — R39.9 URINARY TRACT INFECTION SYMPTOMS: ICD-10-CM

## 2020-06-09 LAB
BACTERIA: ABNORMAL /HPF
BILIRUBIN URINE: NEGATIVE
BLOOD, URINE: NEGATIVE
CASTS 2: ABNORMAL /LPF
CASTS UA: ABNORMAL /LPF
CHARACTER, URINE: ABNORMAL
COLOR: YELLOW
CRYSTALS, UA: ABNORMAL
EPITHELIAL CELLS, UA: ABNORMAL /HPF
GLUCOSE URINE: NEGATIVE MG/DL
KETONES, URINE: NEGATIVE
LEUKOCYTE ESTERASE, URINE: ABNORMAL
MISCELLANEOUS 2: ABNORMAL
NITRITE, URINE: NEGATIVE
PH UA: 5 (ref 5–9)
PROTEIN UA: NEGATIVE
RBC URINE: ABNORMAL /HPF
RENAL EPITHELIAL, UA: ABNORMAL
SPECIFIC GRAVITY, URINE: 1.02 (ref 1–1.03)
UROBILINOGEN, URINE: 0.2 EU/DL (ref 0–1)
WBC UA: ABNORMAL /HPF
YEAST: ABNORMAL

## 2020-06-09 PROCEDURE — 87086 URINE CULTURE/COLONY COUNT: CPT

## 2020-06-09 PROCEDURE — 81001 URINALYSIS AUTO W/SCOPE: CPT

## 2020-06-09 NOTE — TELEPHONE ENCOUNTER
Spoke to patient, she will have another UA completed with culture at [facility]. Did you want to start her on anything prior to culture? Please advise.

## 2020-06-10 ENCOUNTER — TELEPHONE (OUTPATIENT)
Dept: NEPHROLOGY | Age: 65
End: 2020-06-10

## 2020-06-11 LAB
ORGANISM: ABNORMAL
URINE CULTURE REFLEX: ABNORMAL

## 2020-06-12 ENCOUNTER — OFFICE VISIT (OUTPATIENT)
Dept: ENT CLINIC | Age: 65
End: 2020-06-12
Payer: MEDICARE

## 2020-06-12 VITALS
SYSTOLIC BLOOD PRESSURE: 122 MMHG | RESPIRATION RATE: 12 BRPM | BODY MASS INDEX: 26.5 KG/M2 | HEIGHT: 60 IN | DIASTOLIC BLOOD PRESSURE: 72 MMHG | WEIGHT: 135 LBS | TEMPERATURE: 97.3 F | HEART RATE: 66 BPM

## 2020-06-12 PROBLEM — J32.9 RECURRENT SINUSITIS: Status: ACTIVE | Noted: 2020-06-12

## 2020-06-12 PROBLEM — J30.89 ENVIRONMENTAL AND SEASONAL ALLERGIES: Status: ACTIVE | Noted: 2020-06-12

## 2020-06-12 PROBLEM — H61.21 HEARING LOSS OF RIGHT EAR DUE TO CERUMEN IMPACTION: Status: ACTIVE | Noted: 2020-06-12

## 2020-06-12 PROCEDURE — 1123F ACP DISCUSS/DSCN MKR DOCD: CPT | Performed by: OTOLARYNGOLOGY

## 2020-06-12 PROCEDURE — 1036F TOBACCO NON-USER: CPT | Performed by: OTOLARYNGOLOGY

## 2020-06-12 PROCEDURE — 1090F PRES/ABSN URINE INCON ASSESS: CPT | Performed by: OTOLARYNGOLOGY

## 2020-06-12 PROCEDURE — G8427 DOCREV CUR MEDS BY ELIG CLIN: HCPCS | Performed by: OTOLARYNGOLOGY

## 2020-06-12 PROCEDURE — 69210 REMOVE IMPACTED EAR WAX UNI: CPT | Performed by: OTOLARYNGOLOGY

## 2020-06-12 PROCEDURE — 4040F PNEUMOC VAC/ADMIN/RCVD: CPT | Performed by: OTOLARYNGOLOGY

## 2020-06-12 PROCEDURE — G8399 PT W/DXA RESULTS DOCUMENT: HCPCS | Performed by: OTOLARYNGOLOGY

## 2020-06-12 PROCEDURE — 3017F COLORECTAL CA SCREEN DOC REV: CPT | Performed by: OTOLARYNGOLOGY

## 2020-06-12 PROCEDURE — 99204 OFFICE O/P NEW MOD 45 MIN: CPT | Performed by: OTOLARYNGOLOGY

## 2020-06-12 PROCEDURE — G8417 CALC BMI ABV UP PARAM F/U: HCPCS | Performed by: OTOLARYNGOLOGY

## 2020-06-12 NOTE — PROGRESS NOTES
with the exception of her right ear having decreased air conduction hearing at 1024 Hz tuning fork and at 256 tuning fork. Her nose was patent bilaterally and had no purulence. She had no facial tenderness. Her extraocular movements were intact. Her right ear was abnormal for the presence of dense cerumen impaction and no visible tympanic membrane. Her left ear had a normal tympanic membrane and normal middle ear structures with a clean external canal.  Her neck was free of adenopathy and thyromegaly. Her oral cavity was free of obvious postnasal drip and she had a class I Mallampati exam.  Her tongue was normal.  She had no lesions of her oral cavity. On auscultation her lungs were clear and her breath sounds were vesicular. She had no CVA tenderness. Her heart was normal rate and rhythm without murmur or gallop. Her bowel sounds were within normal limits. Her abdomen was soft and nontender. Her upper extremities were abnormal for the presence of a tremor that was not pill-rolling in nature. Her pulses were 2+ and symmetrical.  She had normal capillary refill. She had no lower extremity edema. Her gait was abnormal for a slow elizabeth but not shuffling. Procedure: Cerumen disimpaction; right side  Using an operating microscope and a cerumen curette with a #2 speculum, I was able to deliver a large cerumen plug from the right side completely. Her tympanic membrane and middle ear structures as well as her medial canal beyond the impaction were all within normal limits. The patient tolerated the procedure well. Vitals reviewed. Ct Sinus Wo Contrast    Result Date: 5/28/2020   Redemonstration of surgical changes from prior FES with moderate to severe mucosal inflammation in the left frontal sinus, increased in the interval. **This report has been created using voice recognition software. It may contain minor errors which are inherent in voice recognition technology. ** Final report

## 2020-06-15 ENCOUNTER — TELEMEDICINE (OUTPATIENT)
Dept: PSYCHOLOGY | Age: 65
End: 2020-06-15
Payer: MEDICARE

## 2020-06-15 PROCEDURE — 90834 PSYTX W PT 45 MINUTES: CPT | Performed by: PSYCHOLOGIST
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
## 2020-06-16 ASSESSMENT — ENCOUNTER SYMPTOMS
COLOR CHANGE: 1
SHORTNESS OF BREATH: 0
COUGH: 0
CHEST TIGHTNESS: 0

## 2020-06-17 ENCOUNTER — OFFICE VISIT (OUTPATIENT)
Dept: FAMILY MEDICINE CLINIC | Age: 65
End: 2020-06-17
Payer: MEDICARE

## 2020-06-17 ENCOUNTER — HOSPITAL ENCOUNTER (OUTPATIENT)
Age: 65
Discharge: HOME | End: 2020-06-17
Payer: MEDICARE

## 2020-06-17 VITALS
TEMPERATURE: 98.9 F | BODY MASS INDEX: 26.5 KG/M2 | OXYGEN SATURATION: 98 % | DIASTOLIC BLOOD PRESSURE: 72 MMHG | HEART RATE: 70 BPM | HEIGHT: 60 IN | SYSTOLIC BLOOD PRESSURE: 110 MMHG | WEIGHT: 135 LBS

## 2020-06-17 DIAGNOSIS — T14.8XXA BRUISING: ICD-10-CM

## 2020-06-17 LAB
APTT: 30.7 SECONDS (ref 22–38)
BASOPHILS # BLD: 0.6 %
BASOPHILS ABSOLUTE: 0.1 THOU/MM3 (ref 0–0.1)
EOSINOPHIL # BLD: 2.9 %
EOSINOPHILS ABSOLUTE: 0.3 THOU/MM3 (ref 0–0.4)
ERYTHROCYTE [DISTWIDTH] IN BLOOD BY AUTOMATED COUNT: 13.6 % (ref 11.5–14.5)
ERYTHROCYTE [DISTWIDTH] IN BLOOD BY AUTOMATED COUNT: 50.4 FL (ref 35–45)
HCT VFR BLD CALC: 36.7 % (ref 37–47)
HEMOGLOBIN: 11.4 GM/DL (ref 12–16)
IMMATURE GRANS (ABS): 0.13 THOU/MM3 (ref 0–0.07)
IMMATURE GRANULOCYTES: 1.4 %
INR BLD: 0.84 (ref 0.85–1.13)
LYMPHOCYTES # BLD: 32.9 %
LYMPHOCYTES ABSOLUTE: 3.1 THOU/MM3 (ref 1–4.8)
MCH RBC QN AUTO: 31.1 PG (ref 26–33)
MCHC RBC AUTO-ENTMCNC: 31.1 GM/DL (ref 32.2–35.5)
MCV RBC AUTO: 100 FL (ref 81–99)
MONOCYTES # BLD: 8 %
MONOCYTES ABSOLUTE: 0.8 THOU/MM3 (ref 0.4–1.3)
NUCLEATED RED BLOOD CELLS: 0 /100 WBC
PLATELET # BLD: 271 THOU/MM3 (ref 130–400)
PMV BLD AUTO: 10.9 FL (ref 9.4–12.4)
RBC # BLD: 3.67 MILL/MM3 (ref 4.2–5.4)
SEG NEUTROPHILS: 54.2 %
SEGMENTED NEUTROPHILS ABSOLUTE COUNT: 5.1 THOU/MM3 (ref 1.8–7.7)
WBC # BLD: 9.5 THOU/MM3 (ref 4.8–10.8)

## 2020-06-17 PROCEDURE — 1123F ACP DISCUSS/DSCN MKR DOCD: CPT | Performed by: FAMILY MEDICINE

## 2020-06-17 PROCEDURE — G8399 PT W/DXA RESULTS DOCUMENT: HCPCS | Performed by: FAMILY MEDICINE

## 2020-06-17 PROCEDURE — 85610 PROTHROMBIN TIME: CPT

## 2020-06-17 PROCEDURE — 85025 COMPLETE CBC W/AUTO DIFF WBC: CPT

## 2020-06-17 PROCEDURE — 99214 OFFICE O/P EST MOD 30 MIN: CPT | Performed by: FAMILY MEDICINE

## 2020-06-17 PROCEDURE — 85730 THROMBOPLASTIN TIME PARTIAL: CPT

## 2020-06-17 PROCEDURE — G8427 DOCREV CUR MEDS BY ELIG CLIN: HCPCS | Performed by: FAMILY MEDICINE

## 2020-06-17 PROCEDURE — 36415 COLL VENOUS BLD VENIPUNCTURE: CPT

## 2020-06-17 PROCEDURE — 96372 THER/PROPH/DIAG INJ SC/IM: CPT | Performed by: FAMILY MEDICINE

## 2020-06-17 PROCEDURE — 1036F TOBACCO NON-USER: CPT | Performed by: FAMILY MEDICINE

## 2020-06-17 PROCEDURE — 1090F PRES/ABSN URINE INCON ASSESS: CPT | Performed by: FAMILY MEDICINE

## 2020-06-17 PROCEDURE — 4040F PNEUMOC VAC/ADMIN/RCVD: CPT | Performed by: FAMILY MEDICINE

## 2020-06-17 PROCEDURE — 3017F COLORECTAL CA SCREEN DOC REV: CPT | Performed by: FAMILY MEDICINE

## 2020-06-17 PROCEDURE — G8417 CALC BMI ABV UP PARAM F/U: HCPCS | Performed by: FAMILY MEDICINE

## 2020-06-17 RX ORDER — METHYLPREDNISOLONE ACETATE 80 MG/ML
80 INJECTION, SUSPENSION INTRA-ARTICULAR; INTRALESIONAL; INTRAMUSCULAR; SOFT TISSUE ONCE
Status: COMPLETED | OUTPATIENT
Start: 2020-06-17 | End: 2020-06-17

## 2020-06-17 RX ADMIN — METHYLPREDNISOLONE ACETATE 80 MG: 80 INJECTION, SUSPENSION INTRA-ARTICULAR; INTRALESIONAL; INTRAMUSCULAR; SOFT TISSUE at 09:41

## 2020-06-17 ASSESSMENT — ENCOUNTER SYMPTOMS
ABDOMINAL PAIN: 1
NAUSEA: 0
EYE DISCHARGE: 0
EYE REDNESS: 0
RHINORRHEA: 1
VOMITING: 0

## 2020-06-17 NOTE — PROGRESS NOTES
[address] [place] [address] [address], [address], [place]  Phone:  [phone]  [facility]:[phone]       Name: Ashley Morel  : 1955    Chief Complaint   Patient presents with    Bleeding/Bruising     mainly on the lower thigh areas   now better        HPI:     Ashley Morel is a 75 y.o. female who presents today for evaluation of bruising and to discuss allergies. She is on Plavix and ASA and has tolerated them well, but recently she's had a lot of bruising with no injuries. Her legs were both bruised up a few weeks ago and are slowly improving. She denies any blood in her stool or urine. She has chronic rhinorrhea. She saw ENT who referred her to an allergist who she will be seeing in July. She'd like a steroid injection today as she can't go outside without being miserable. Current Outpatient Medications:     Probiotic Product (PROBIOTIC-10) CHEW, Take by mouth daily, Disp: , Rfl:     Triamcinolone Acetonide (NASACORT AQ NA), by Nasal route, Disp: , Rfl:     famotidine (PEPCID) 20 MG tablet, Take 1 tablet by mouth 2 times daily, Disp: 60 tablet, Rfl: 3    linaclotide (LINZESS) 145 MCG capsule, Take 145 mcg by mouth every morning (before breakfast), Disp: , Rfl:     chlordiazePOXIDE-clidinium (LIBRAX) 5-2.5 MG per capsule, Take 1 capsule by mouth daily.  TAKE 1 CAPSULE TWICE A DAY AS NEEDED FOR PAIN, Disp: 30 capsule, Rfl: 2    traZODone (DESYREL) 50 MG tablet, TAKE 1 TABLET NIGHTLY, Disp: 90 tablet, Rfl: 0    simvastatin (ZOCOR) 20 MG tablet, TAKE 1 TABLET DAILY, Disp: 90 tablet, Rfl: 1    vitamin D (ERGOCALCIFEROL) 1.25 MG (44130 UT) CAPS capsule, Take 1 capsule by mouth once a week, Disp: 12 capsule, Rfl: 1    clopidogrel (PLAVIX) 75 MG tablet, TAKE 1 TABLET DAILY, Disp: 90 tablet, Rfl: 1    buPROPion (WELLBUTRIN SR) 150 MG extended release tablet, TAKE 1 TABLET TWICE A DAY, Disp: 180 tablet, Rfl: 1    triamcinolone (KENALOG) 0.025 % cream, Apply Topically, Physical Exam  Vitals signs and nursing note reviewed. Constitutional:       General: She is not in acute distress. Appearance: She is well-developed. HENT:      Head: Normocephalic and atraumatic. Nose: Nose normal.   Eyes:      Conjunctiva/sclera: Conjunctivae normal.   Neck:      Musculoskeletal: Normal range of motion and neck supple. Cardiovascular:      Rate and Rhythm: Normal rate and regular rhythm. Heart sounds: Normal heart sounds. Pulmonary:      Effort: Pulmonary effort is normal. No respiratory distress. Breath sounds: Normal breath sounds. No wheezing. Skin:     General: Skin is warm and dry. Neurological:      Mental Status: She is alert and oriented to person, place, and time. Psychiatric:         Behavior: Behavior normal.       Assessment/Plan: Patrick De Anda was seen today for bleeding/bruising. Diagnoses and all orders for this visit:    Bruising  -     The bruising is likely from the ASA and Plavix, but will check labs for further evaluation.  -     CBC With Auto Differential; Future  -     Protime-INR; Future  -     APTT; Future    Seasonal allergies  -     Will treat allergies with a steroid injection until she can be seen by an allergist.  -     methylPREDNISolone acetate (DEPO-MEDROL) injection 80 mg      Return if symptoms worsen or fail to improve.     Electronically signed by Yoly Helms MD on 6/17/2020 at 10:59 AM

## 2020-06-24 RX ORDER — CHLORDIAZEPOXIDE HYDROCHLORIDE AND CLIDINIUM BROMIDE 5; 2.5 MG/1; MG/1
CAPSULE ORAL
Qty: 30 CAPSULE | Refills: 2 | Status: CANCELLED | OUTPATIENT
Start: 2020-06-24

## 2020-06-24 RX ORDER — TRAZODONE HYDROCHLORIDE 50 MG/1
TABLET ORAL
Qty: 90 TABLET | Refills: 0 | Status: SHIPPED | OUTPATIENT
Start: 2020-06-24 | End: 2020-10-13 | Stop reason: SDUPTHER

## 2020-06-24 NOTE — TELEPHONE ENCOUNTER
Marty Curling called requesting a refill on the following medications:  Requested Prescriptions     Pending Prescriptions Disp Refills    chlordiazePOXIDE-clidinium (LIBRAX) 5-2.5 MG per capsule 30 capsule 2     Sig: TAKE 1 CAPSULE TWICE A DAY AS NEEDED FOR PAIN    traZODone (DESYREL) 50 MG tablet 90 tablet 0     Sig: TAKE 1 TABLET NIGHTLY       Date of last visit: 6/17/2020  Date of next visit (if applicable):Visit date not found  Pharmacy Name: [facility] ([address] [place] [address])

## 2020-06-25 ENCOUNTER — TELEPHONE (OUTPATIENT)
Dept: FAMILY MEDICINE CLINIC | Age: 65
End: 2020-06-25

## 2020-06-25 RX ORDER — BROMPHENIRAMINE MALEATE, PSEUDOEPHEDRINE HYDROCHLORIDE, AND DEXTROMETHORPHAN HYDROBROMIDE 2; 30; 10 MG/5ML; MG/5ML; MG/5ML
5 SYRUP ORAL 4 TIMES DAILY PRN
Qty: 140 ML | Refills: 0 | Status: SHIPPED | OUTPATIENT
Start: 2020-06-25 | End: 2020-09-25

## 2020-06-25 RX ORDER — FAMOTIDINE 20 MG/1
20 TABLET, FILM COATED ORAL 2 TIMES DAILY
Qty: 60 TABLET | Refills: 3 | Status: CANCELLED | OUTPATIENT
Start: 2020-06-25

## 2020-06-25 NOTE — TELEPHONE ENCOUNTER
Patient called  has been having ongoing issues with cough, sinus drainage, sore throat, sinus drainage, ear discomfort.  is having the issues again and wondering if you suggest anything she could do. She said in the past you have given her cough medicine.  She is seeing allergist in August.

## 2020-06-26 RX ORDER — CHLORDIAZEPOXIDE HYDROCHLORIDE AND CLIDINIUM BROMIDE 5; 2.5 MG/1; MG/1
1 CAPSULE ORAL DAILY
Qty: 30 CAPSULE | Refills: 2 | Status: SHIPPED | OUTPATIENT
Start: 2020-06-26 | End: 2020-09-08 | Stop reason: SDUPTHER

## 2020-07-06 ENCOUNTER — HOSPITAL ENCOUNTER (OUTPATIENT)
Age: 65
Discharge: HOME | End: 2020-07-06
Payer: MEDICARE

## 2020-07-06 ENCOUNTER — TELEPHONE (OUTPATIENT)
Dept: NEPHROLOGY | Age: 65
End: 2020-07-06

## 2020-07-06 DIAGNOSIS — N18.4 STAGE 4 CHRONIC KIDNEY DISEASE (HCC): ICD-10-CM

## 2020-07-06 LAB
ANION GAP SERPL CALCULATED.3IONS-SCNC: 12 MEQ/L (ref 8–16)
BUN BLDV-MCNC: 26 MG/DL (ref 7–22)
CALCIUM SERPL-MCNC: 9.5 MG/DL (ref 8.5–10.5)
CHLORIDE BLD-SCNC: 105 MEQ/L (ref 98–111)
CO2: 29 MEQ/L (ref 23–33)
CREAT SERPL-MCNC: 1.7 MG/DL (ref 0.4–1.2)
CREATININE URINE: 90.2 MG/DL
CREATININE, URINE: 90.2 MG/DL
GFR SERPL CREATININE-BSD FRML MDRD: 30 ML/MIN/1.73M2
GLUCOSE BLD-MCNC: 99 MG/DL (ref 70–108)
MICROALBUMIN UR-MCNC: < 1.2 MG/DL
MICROALBUMIN/CREAT UR-RTO: 13 MG/G (ref 0–30)
POTASSIUM SERPL-SCNC: 4.3 MEQ/L (ref 3.5–5.2)
PROT/CREAT RATIO, UR: 0.11
PROTEIN, URINE: 10.3 MG/DL
SODIUM BLD-SCNC: 146 MEQ/L (ref 135–145)

## 2020-07-06 PROCEDURE — 80048 BASIC METABOLIC PNL TOTAL CA: CPT

## 2020-07-06 PROCEDURE — 82043 UR ALBUMIN QUANTITATIVE: CPT

## 2020-07-06 PROCEDURE — 36415 COLL VENOUS BLD VENIPUNCTURE: CPT

## 2020-07-06 PROCEDURE — 82570 ASSAY OF URINE CREATININE: CPT

## 2020-07-06 PROCEDURE — 84156 ASSAY OF PROTEIN URINE: CPT

## 2020-07-07 ENCOUNTER — OFFICE VISIT (OUTPATIENT)
Dept: NEPHROLOGY | Age: 65
End: 2020-07-07
Payer: MEDICARE

## 2020-07-07 VITALS
WEIGHT: 134 LBS | BODY MASS INDEX: 26.17 KG/M2 | TEMPERATURE: 97.3 F | HEART RATE: 86 BPM | DIASTOLIC BLOOD PRESSURE: 72 MMHG | OXYGEN SATURATION: 100 % | SYSTOLIC BLOOD PRESSURE: 129 MMHG

## 2020-07-07 PROCEDURE — 99213 OFFICE O/P EST LOW 20 MIN: CPT | Performed by: INTERNAL MEDICINE

## 2020-07-07 PROCEDURE — 3017F COLORECTAL CA SCREEN DOC REV: CPT | Performed by: INTERNAL MEDICINE

## 2020-07-07 PROCEDURE — 1036F TOBACCO NON-USER: CPT | Performed by: INTERNAL MEDICINE

## 2020-07-07 PROCEDURE — G8428 CUR MEDS NOT DOCUMENT: HCPCS | Performed by: INTERNAL MEDICINE

## 2020-07-07 PROCEDURE — 4040F PNEUMOC VAC/ADMIN/RCVD: CPT | Performed by: INTERNAL MEDICINE

## 2020-07-07 PROCEDURE — 1090F PRES/ABSN URINE INCON ASSESS: CPT | Performed by: INTERNAL MEDICINE

## 2020-07-07 PROCEDURE — 1123F ACP DISCUSS/DSCN MKR DOCD: CPT | Performed by: INTERNAL MEDICINE

## 2020-07-07 PROCEDURE — G8399 PT W/DXA RESULTS DOCUMENT: HCPCS | Performed by: INTERNAL MEDICINE

## 2020-07-07 PROCEDURE — G8417 CALC BMI ABV UP PARAM F/U: HCPCS | Performed by: INTERNAL MEDICINE

## 2020-07-07 NOTE — PROGRESS NOTES
[facility]        Outpatient Follow-Up note         7/7/2020 9:52 AM    Patient Name:   Nathen Monterroso  YOB: 1955  Primary Care Physician:  Audrey Liriano MD      Chief Complaint / Reason for follow-up : Follow Up of CKD     Interval History :  Patient seen and examined by me. Feels well. No CP or SOB . Some back pain  On omnicef for UTI     Past History :  Past Medical History:   Diagnosis Date    Allergic rhinitis     Anxiety     CKD stage 4 due to type 2 diabetes mellitus (HCC)     CVA (cerebral infarction)     Depression     Fibromyalgia     Headache(784.0)     Hyperlipidemia     Hypertension     Irritable bowel syndrome     Kidney failure     Unspecified cerebral artery occlusion with cerebral infarction     Unspecified sleep apnea      Past Surgical History:   Procedure Laterality Date    CARPAL TUNNEL RELEASE Right     [facility]    ENDOSCOPY, COLON, DIAGNOSTIC  unsure    EYE SURGERY      lasik    HEMORRHOID SURGERY  unsure    HYSTERECTOMY      total    NECK SURGERY  18  years ago    fusion    SINUS SURGERY  10 years ago    TUBAL LIGATION          Medications :     Outpatient Medications Marked as Taking for the 7/7/20 encounter (Office Visit) with Jonathan Harris MD   Medication Sig Dispense Refill    chlordiazePOXIDE-clidinium (LIBRAX) 5-2.5 MG per capsule Take 1 capsule by mouth daily.  TAKE 1 CAPSULE TWICE A DAY AS NEEDED FOR PAIN 30 capsule 2    brompheniramine-pseudoephedrine-DM 2-30-10 MG/5ML syrup Take 5 mLs by mouth 4 times daily as needed for Congestion or Cough 140 mL 0    traZODone (DESYREL) 50 MG tablet TAKE 1 TABLET NIGHTLY 90 tablet 0    Probiotic Product (PROBIOTIC-10) CHEW Take by mouth daily      Triamcinolone Acetonide (NASACORT AQ NA) by Nasal route      famotidine (PEPCID) 20 MG tablet Take 1 tablet by mouth 2 times daily 60 tablet 3    linaclotide (LINZESS) 145 MCG capsule Take 145 mcg by mouth every morning (before breakfast)      simvastatin (ZOCOR) 20 MG tablet TAKE 1 TABLET DAILY 90 tablet 1    vitamin D (ERGOCALCIFEROL) 1.25 MG (22540 UT) CAPS capsule Take 1 capsule by mouth once a week 12 capsule 1    clopidogrel (PLAVIX) 75 MG tablet TAKE 1 TABLET DAILY 90 tablet 1    buPROPion (WELLBUTRIN SR) 150 MG extended release tablet TAKE 1 TABLET TWICE A  tablet 1    triamcinolone (KENALOG) 0.025 % cream Apply Topically 15 g 0    triamcinolone (KENALOG) 0.1 % cream Apply topically 2 times daily for up to 2 weeks. 30 g 0    MAGNESIUM CITRATE PO Take by mouth as needed (weekly)      OYSCO 500 + D 500-200 MG-UNIT per tablet TAKE 2 TABLETS BY MOUTH TWICE DAILY 180 tablet 3    denosumab (PROLIA) 60 MG/ML SOLN SC injection Inject 60 mg into the skin once      folic acid (FOLVITE) 319 MCG tablet Take 400 mcg by mouth daily      loratadine (CLARITIN) 10 MG tablet Take 1 tablet by mouth daily 30 tablet 2    b complex vitamins capsule Take 1 capsule by mouth daily 100 capsule 3    FISH OIL 1,000 mg by Does not apply route 2 times daily Indications: Decreased Energy       aspirin 81 MG tablet Take 81 mg by mouth daily.            Vitals     /72 (Site: Right Upper Arm, Position: Sitting, Cuff Size: Medium Adult)   Pulse 86   Temp 97.3 °F (36.3 °C) (Temporal)   Wt 134 lb (60.8 kg)   SpO2 100%   BMI 26.17 kg/m²  Wt Readings from Last 3 Encounters:   07/07/20 134 lb (60.8 kg)   06/17/20 135 lb (61.2 kg)   06/12/20 135 lb (61.2 kg)        Physical Exam     General -- no distress  Lungs -- clear  Heart -- S1, S2 heard, JVD - no  Abdomen - soft, non-tender  Extremities -- no edema  CNS - awake and alert    Labs, Radiology and Tests    Labs -      1/18 7/18 12/18 8/19 7/20           Potassium  4.8  5.1  4.2  4.1  4.3          BUN  30  34  25  29  26          Creatinine  1.6  1.8  1.6  1.8  1.7          eGFR  32  28  32  28  30                               UPCR  < 200    < 200  100 110

## 2020-07-20 ENCOUNTER — OFFICE VISIT (OUTPATIENT)
Dept: PSYCHOLOGY | Age: 65
End: 2020-07-20
Payer: MEDICARE

## 2020-07-20 PROCEDURE — 90837 PSYTX W PT 60 MINUTES: CPT | Performed by: PSYCHOLOGIST

## 2020-07-20 NOTE — PROGRESS NOTES
[facility]  [address]  [address] [place] [address]  Dept: [phone]  Dept Fax: [phone]: [phone]    Patient: Mariah Core  Visit Date: 7/20/2020  Referring Provider:   No ref. provider found    SUBJECTIVE DATA     CHIEF COMPLAINT:    Chief Complaint   Patient presents with    Anxiety    Depression    Stress       Patient update: This session was conducted as a face-to-face meeting, per patient's request, with appropriate social distancing and both patient and psychologist wearing masks. Patient reports \"I'm so upset. \"  Tamir Giraldo has been staying with them, and she has been very overbearing and telling them what to do with all their stuff. She has been refusing to stay with her in-laws in [place], trying to get Renaye Look to go through her items and get rid of everything. Lots of hurt feelings there.  Nova Tsai has been taking Rosario's side, which is super hurtful to patient. Geovanna Chun has been doing really well, and patient is pleased about that. Has been able to revive the depression support group, which had been on hiatus for months due to Carmen. Putting friend Ayala Rider on the prayer list at [facility], due to all that she is going through. Reports that granddaughter Magalie had her baby Miltonnine, who was very premature, but is doing extremely well. Wanted to show me a photo of the baby. Pleased about the baby's progress. OBJECTIVE DATA     Physical Exam:    Mental Status Evaluation:   Orientation: Alert, oriented. Mood:. anxious, increasingly depressed      Affect:  Sad, anxious      Appearance:  Normal   Activity:  Normal   Gait/Posture: Halting, as per usual   Speech:  Normal, talkative   Thought Process:  within normal limits   Thought Content:   Within Normal Limits   Cognition:  Normal   Memory: Normal   Insight:  good   Judgment: Normal   Suicidal Ideations: Denies Suicidal Ideations   Homicidal Ideations: Denies Homicidal Ideations   Medication Side Effects: absent       Attention Span Normal     Screenings Completed in This Encounter:                                      DIAGNOSIS AND ASSESSMENT DATA     DIAGNOSIS:  See visit diagnoses. 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 Doing OK with maintenance sessions. · Anxiety continues to be an issue, with reassurance-seeking her most common coping strategy. I want her to use cognitive reframing more freely. · Focus on meaning & purpose, being there for others, which she is quite good at    ASSESSMENT/PLAN   Follow-up:  No follow-ups on file. Resume good work at [employer], when possible. Resume use of  IBS hypnosis recording (on phone)  Ask Radha Villa to intervene when it comes to your relationship with Aamir Martin and Scott in the loop when it comes to finances, and let them support you. Has a very strong emotional/social support system, which is one of her great strengths. Needs to tap into that to work on the distress with her   Has re-started bright light therapy, using it regularly  Put some limits on your   Let go of the anxiety about Hardik Gilmore on [employer] assignment before next session:     [] Practice deep breathing 1-2 times per day for 15 minutes, as demonstrated in session, and/or:    [] Go to [employer]" web site and begin practicing with their free meditation podcasts    [x] Track thoughts that you think feed anxiety or depression    [] Go to [employer]\" web site, open up the \"Workbooks\" tab, and select a problem from the list that best suits your needs. Review the first module. [x] Begin to track physical activity.  Even five minutes per day will be helpful.    [] Talk with your physician about whether it's OK to start fish oil (burpless) or flax oil, 1000 to 1200 mg per day    [] Most importantly, remember this guideline: \"Don't believe everything you think! \"   :)    [] Try \"Expressive Writing\" using the guidelines on the handout    [x] Start yoga class, as discussed. 1. Continue work at [employer]. Let the trainers tailor a program to your needs (once feasible)  2. Continue enjoying time with friends on a regular basis. 3. Assert yourself when it comes to [place] and the finances. 4. Practice daily relaxation training, again. 5. Start journaling  6. Re-start yoga for multiple physical issues  7. Continue to focus on improving quality of life, for now  8. Keep your phone with you at all times, so as to be able to call for help if you fall. 9. Regular social time  10. Recognize your strengths, which include facilitating connections between other people. 11. Restart bright light therapy once the weather gets gloomy  12. Continue depression support group attendance  15. Do more walking and social events  14. Reframe cognitions about marriage--how to make things work  15. Spend more time with the friends who make you laugh  12. Remind yourself that Yahaira Daily and Jaki Gudino will never let you starve or be homeless-- you have good supports and it's OK to rely on them as needed. 17. Continue to use the card I gave you, with the coping strategies. Continue with better assertion re: health needs. Continue with self-care and \"Serenity Prayer\" for letting go of things you cannot control or fix. See items under \"Plan,\" above. Session lasted 55 minutes.     Provider Signature:  Electronically signed by Pedro Parker, PhD on 7/20/2020 at 9:05 AM

## 2020-07-30 ENCOUNTER — VIRTUAL VISIT (OUTPATIENT)
Dept: PSYCHOLOGY | Age: 65
End: 2020-07-30
Payer: MEDICARE

## 2020-07-30 PROCEDURE — 98968 PH1 ASSMT&MGMT NQHP 21-30: CPT | Performed by: PSYCHOLOGIST

## 2020-07-30 NOTE — PROGRESS NOTES
Process:  within normal limits   Thought Content: Within Normal Limits   Cognition:  Normal   Memory: Normal   Insight:  good   Judgment: Normal   Suicidal Ideations: Denies Suicidal Ideations   Homicidal Ideations: Denies Homicidal Ideations   Medication Side Effects: absent       Attention Span Normal     Screenings Completed in This Encounter:                                      DIAGNOSIS AND ASSESSMENT DATA     DIAGNOSIS:  See visit diagnoses. 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 Doing OK with maintenance sessions. · Anxiety continues to be an issue, with reassurance-seeking her most common coping strategy. I want her to use cognitive reframing more freely. · Focus on meaning & purpose, being there for others, which she is quite good at    ASSESSMENT/PLAN   Follow-up:  No follow-ups on file. Resume good work at [employer], when possible. Resume use of  IBS hypnosis recording (on phone)  Ask Renea Montelongo to intervene when it comes to your relationship with Aye Alcaraz and Scott in the loop when it comes to finances, and let them support you. Has a very strong emotional/social support system, which is one of her great strengths.    Needs to tap into that to work on the distress with her   Has re-started bright light therapy, using it regularly  Put some limits on your   Let go of the anxiety about Hardik Gilmore on [employer] assignment before next session:     [] Practice deep breathing 1-2 times per day for 15 minutes, as demonstrated in session, and/or:    [] Go to [employer]" web site and begin practicing with their free meditation podcasts    [x] Track thoughts that you think feed anxiety or depression    [] Go to [employer]\" web site, open up the \"Workbooks\" tab, and select a problem from the list that best suits your needs. Review the first module. [x] Begin to track physical activity. Even five minutes per day will be helpful.    [] Talk with your physician about whether it's OK to start fish oil (burpless) or flax oil, 1000 to 1200 mg per day    [] Most importantly, remember this guideline: \"Don't believe everything you think! \"   :)    [] Try \"Expressive Writing\" using the guidelines on the handout    [x] Start yoga class, as discussed. 1. Continue work at [employer]. Let the trainers tailor a program to your needs (once feasible)  2. Continue enjoying time with friends on a regular basis. 3. Assert yourself when it comes to Anastasiya Villagomez and the finances. 4. Practice daily relaxation training, again. 5. Start journaling  6. Re-start yoga for multiple physical issues  7. Continue to focus on improving quality of life, for now  8. Keep your phone with you at all times, so as to be able to call for help if you fall. 9. Regular social time  10. Recognize your strengths, which include facilitating connections between other people. 11. Restart bright light therapy once the weather gets gloomy  12. Continue depression support group attendance  15. Do more walking and social events  14. Reframe cognitions about marriage--how to make things work  15. Spend more time with the friends who make you laugh  12. Remind yourself that Lam Wheeler and [employer]g N[employer] will never let you starve or be homeless-- you have good supports and it's OK to rely on them as needed. 17. Continue to use the card I gave you, with the coping strategies. Continue with better assertion re: health needs. Continue with self-care and \"Serenity Prayer\" for letting go of things you cannot control or fix. See items under \"Plan,\" above. Session lasted 41 minutes.     Provider Signature:  Electronically signed by Lalit Dumas, PhD on 7/30/2020 at 2:07 PM

## 2020-08-01 ENCOUNTER — PATIENT MESSAGE (OUTPATIENT)
Dept: PSYCHOLOGY | Age: 65
End: 2020-08-01

## 2020-08-03 ENCOUNTER — OFFICE VISIT (OUTPATIENT)
Dept: RHEUMATOLOGY | Age: 65
End: 2020-08-03
Payer: MEDICARE

## 2020-08-03 ENCOUNTER — NURSE ONLY (OUTPATIENT)
Dept: LAB | Age: 65
End: 2020-08-03

## 2020-08-03 ENCOUNTER — OFFICE VISIT (OUTPATIENT)
Dept: ALLERGY | Age: 65
End: 2020-08-03
Payer: MEDICARE

## 2020-08-03 VITALS
SYSTOLIC BLOOD PRESSURE: 120 MMHG | TEMPERATURE: 96.6 F | DIASTOLIC BLOOD PRESSURE: 76 MMHG | HEART RATE: 66 BPM | BODY MASS INDEX: 26.17 KG/M2 | WEIGHT: 134 LBS | RESPIRATION RATE: 12 BRPM

## 2020-08-03 VITALS
OXYGEN SATURATION: 97 % | HEIGHT: 60 IN | BODY MASS INDEX: 26.5 KG/M2 | WEIGHT: 135 LBS | HEART RATE: 80 BPM | DIASTOLIC BLOOD PRESSURE: 94 MMHG | SYSTOLIC BLOOD PRESSURE: 144 MMHG

## 2020-08-03 LAB
BASOPHILS # BLD: 0.7 %
BASOPHILS ABSOLUTE: 0.1 THOU/MM3 (ref 0–0.1)
EOSINOPHIL # BLD: 1.2 %
EOSINOPHILS ABSOLUTE: 0.1 THOU/MM3 (ref 0–0.4)
ERYTHROCYTE [DISTWIDTH] IN BLOOD BY AUTOMATED COUNT: 13 % (ref 11.5–14.5)
ERYTHROCYTE [DISTWIDTH] IN BLOOD BY AUTOMATED COUNT: 47 FL (ref 35–45)
HCT VFR BLD CALC: 38 % (ref 37–47)
HEMOGLOBIN: 11.7 GM/DL (ref 12–16)
IMMATURE GRANS (ABS): 0.04 THOU/MM3 (ref 0–0.07)
IMMATURE GRANULOCYTES: 0.5 %
LYMPHOCYTES # BLD: 26.9 %
LYMPHOCYTES ABSOLUTE: 2 THOU/MM3 (ref 1–4.8)
MCH RBC QN AUTO: 30.7 PG (ref 26–33)
MCHC RBC AUTO-ENTMCNC: 30.8 GM/DL (ref 32.2–35.5)
MCV RBC AUTO: 99.7 FL (ref 81–99)
MONOCYTES # BLD: 6.6 %
MONOCYTES ABSOLUTE: 0.5 THOU/MM3 (ref 0.4–1.3)
NUCLEATED RED BLOOD CELLS: 0 /100 WBC
PLATELET # BLD: 297 THOU/MM3 (ref 130–400)
PMV BLD AUTO: 11 FL (ref 9.4–12.4)
RBC # BLD: 3.81 MILL/MM3 (ref 4.2–5.4)
SEG NEUTROPHILS: 64.1 %
SEGMENTED NEUTROPHILS ABSOLUTE COUNT: 4.9 THOU/MM3 (ref 1.8–7.7)
WBC # BLD: 7.6 THOU/MM3 (ref 4.8–10.8)

## 2020-08-03 PROCEDURE — 1036F TOBACCO NON-USER: CPT | Performed by: NURSE PRACTITIONER

## 2020-08-03 PROCEDURE — 1090F PRES/ABSN URINE INCON ASSESS: CPT | Performed by: NURSE PRACTITIONER

## 2020-08-03 PROCEDURE — 1123F ACP DISCUSS/DSCN MKR DOCD: CPT | Performed by: NURSE PRACTITIONER

## 2020-08-03 PROCEDURE — 3017F COLORECTAL CA SCREEN DOC REV: CPT | Performed by: NURSE PRACTITIONER

## 2020-08-03 PROCEDURE — G8417 CALC BMI ABV UP PARAM F/U: HCPCS | Performed by: NURSE PRACTITIONER

## 2020-08-03 PROCEDURE — 4040F PNEUMOC VAC/ADMIN/RCVD: CPT | Performed by: NURSE PRACTITIONER

## 2020-08-03 PROCEDURE — G8399 PT W/DXA RESULTS DOCUMENT: HCPCS | Performed by: NURSE PRACTITIONER

## 2020-08-03 PROCEDURE — G8427 DOCREV CUR MEDS BY ELIG CLIN: HCPCS | Performed by: NURSE PRACTITIONER

## 2020-08-03 PROCEDURE — 99214 OFFICE O/P EST MOD 30 MIN: CPT | Performed by: NURSE PRACTITIONER

## 2020-08-03 PROCEDURE — 99213 OFFICE O/P EST LOW 20 MIN: CPT | Performed by: NURSE PRACTITIONER

## 2020-08-03 ASSESSMENT — ENCOUNTER SYMPTOMS
EYE ITCHING: 1
NAUSEA: 0
EYE ITCHING: 1
VOMITING: 0
RHINORRHEA: 1
COUGH: 0
CONSTIPATION: 1
SINUS PAIN: 1
TROUBLE SWALLOWING: 0
BACK PAIN: 1
SORE THROAT: 1
SHORTNESS OF BREATH: 0

## 2020-08-03 NOTE — PROGRESS NOTES
Allergy & Asthma   [address] [facility] [address]  [facility] II.[place], [address]  [address] [facility]  Fax: [phone]          Chief Complaint:   Chief Complaint   Patient presents with    New Patient     Patient is here for environmental allergies and sneezing. HISTORY OF PRESENT ILLNESS: NEW PATIENT TO PRACTICE   49-year-old  female here today for evaluation of allergic rhinitis. Patient states that she has had allergies for years which is progressively gotten worse. Severity is moderate. She complains of rhinorrhea. Her she states that her nose drips constantly. She states it started really bad back in February and is progressively gotten worse. The patient has and a long haired cat for little over the year that stays indoors but she does not pet the cat. She denies any nausea vomiting or chest pain. Patient was referred here by Dr. Sherine Tejada. Patient has tried Claritin, Nasacort and various allergy medicines and to improve her allergies. She states nothing is helped. She is also used a Rae pot which has not helped. Patient is very kind and open to suggestions. She would like to go ahead and proceed with allergy testing next visit. Review of Systems:  Review of Systems   HENT: Positive for congestion, postnasal drip, rhinorrhea, sinus pain, sneezing and sore throat. Eyes: Positive for itching. Allergic/Immunologic: Positive for environmental allergies and immunocompromised state. All other systems reviewed and are negative.       Past Medical History:    Past Medical History:   Diagnosis Date    Allergic rhinitis     Anxiety     CKD stage 4 due to type 2 diabetes mellitus ([facility] [address]     CVA (cerebral infarction)     Depression     Fibromyalgia     Headache(784.0)     Hyperlipidemia     Hypertension     Irritable bowel syndrome     Kidney failure     Unspecified cerebral artery occlusion with cerebral infarction     Unspecified sleep apnea        Past Surgical History:    Past Surgical History:   Procedure Laterality Date    CARPAL TUNNEL RELEASE Right     [facility]  2012    [facility]    ENDOSCOPY, COLON, DIAGNOSTIC  unsure    EYE SURGERY      lasik    HEMORRHOID SURGERY  unsure    HYSTERECTOMY      total    NECK SURGERY  18  years ago    fusion    SINUS SURGERY  10 years ago    TUBAL LIGATION         Family History:   Family History   Problem Relation Age of Onset    Diabetes Mother     High Blood Pressure Mother     Heart Disease Mother     Heart Disease Father    Rich Olp Parkinsonism Father     Neurofibromatosis Father    Rich Olp Depression Father     Neurofibromatosis Sister     Neurofibromatosis Brother     Other Brother         multiple sclerosis    Dementia Brother     Diabetes Sister     High Blood Pressure Sister     Depression Sister        Social History:   Social History     Tobacco Use    Smoking status: Never Smoker    Smokeless tobacco: Never Used   Substance Use Topics    Alcohol use: No     Alcohol/week: 0.0 standard drinks        Allergies: Allergies   Allergen Reactions    Actos [Pioglitazone] Nausea And Vomiting    Augmentin [Amoxicillin-Pot Clavulanate] Diarrhea    Ciprofloxacin      unsure    Sulfa Antibiotics Rash       Current Medications:   Prior to Visit Medications    Medication Sig Taking? Authorizing Provider   Tetrahydrozoline-Zn Sulfate (EYE DROPS ALLERGY RELIEF OP) Apply 1 drop to eye daily Yes Historical Provider, MD   chlordiazePOXIDE-clidinium (LIBRAX) 5-2.5 MG per capsule Take 1 capsule by mouth daily.  TAKE 1 CAPSULE TWICE A DAY AS NEEDED FOR PAIN Yes Angelo Suárez MD   brompheniramine-pseudoephedrine-DM 2-30-10 MG/5ML syrup Take 5 mLs by mouth 4 times daily as needed for Congestion or Cough Yes Angelo Suárez MD   traZODone (DESYREL) 50 MG tablet TAKE 1 TABLET NIGHTLY Yes Angelo Suárez MD   Probiotic Product (PROBIOTIC-10) CHEW Take by mouth daily Yes Historical Provider, MD   Triamcinolone rhinorrhea present. Mouth/Throat:      Mouth: Mucous membranes are moist.      Pharynx: Oropharynx is clear. Eyes:      Extraocular Movements: Extraocular movements intact. Conjunctiva/sclera: Conjunctivae normal.      Pupils: Pupils are equal, round, and reactive to light. Neck:      Musculoskeletal: Normal range of motion and neck supple. Cardiovascular:      Rate and Rhythm: Normal rate and regular rhythm. Heart sounds: Normal heart sounds. Pulmonary:      Effort: Pulmonary effort is normal.      Breath sounds: Normal breath sounds. Musculoskeletal: Normal range of motion. Skin:     General: Skin is warm and dry. Capillary Refill: Capillary refill takes less than 2 seconds. Findings: Bruising present. Neurological:      General: No focal deficit present. Mental Status: She is alert and oriented to person, place, and time. Mental status is at baseline. Psychiatric:         Mood and Affect: Mood normal.         Behavior: Behavior normal.         Thought Content:  Thought content normal.         Judgment: Judgment normal.           DATA:  Lab Review:   Results for orders placed or performed during the hospital encounter of 07/06/20   Protein / Creatinine Ratio, Urine   Result Value Ref Range    Protein, Urine 10.3 mg/dl    Creatinine, Urine 90.2 mg/dl    Prot/Creat Ratio, Ur 0.11    Microalbumin / Creatinine Urine Ratio   Result Value Ref Range    Microalbumin, Random Urine < 1.20 mg/dL    Creatinine, Urine 90.2 mg/dL    Microalb/Creat Ratio 13 0 - 30 mg/g   Basic Metabolic Panel   Result Value Ref Range    Sodium 146 (H) 135 - 145 meq/L    Potassium 4.3 3.5 - 5.2 meq/L    Chloride 105 98 - 111 meq/L    CO2 29 23 - 33 meq/L    Glucose 99 70 - 108 mg/dL    BUN 26 (H) 7 - 22 mg/dL    CREATININE 1.7 (H) 0.4 - 1.2 mg/dL    Calcium 9.5 8.5 - 10.5 mg/dL   Anion Gap   Result Value Ref Range    Anion Gap 12.0 8.0 - 16.0 meq/L   Glomerular Filtration Rate, Estimated   Result Value Ref Range    Est, Glom Filt Rate 30 (A) ml/min/1.73m2         Data from outside facility:yes, reviewed note from dr Valdez Section 06/12/2020    PROCEDURES:        Skin Testing performed on: pending next visit    Assessment/Plan   Waleska Sanford was seen today for new patient. Diagnoses and all orders for this visit:    Non-seasonal allergic rhinitis, unspecified trigger  -     CBC Auto Differential; Future  -     IgA; Future  -     IgE; Future  -     IgG; Future  -     IgM; Future    Allergic sinusitis        Return in about 1 week (around 8/10/2020), or return at 11 am for allergy testing, for Allergy Testing. Total time spent with patient greater than 45 minutes with at least 50% being in education.   (Please note that portions of this note may have been completed with a voice recognition program.  Efforts were made to edit the dictation but occasionally words are mis-transcribed.)        Signed:   CARLOS MANUEL Parisi CNP  8/3/2020  3:39 PM

## 2020-08-03 NOTE — TELEPHONE ENCOUNTER
From: Cierra Zavala  To: Zaria Oneil, PhD  Sent: 8/1/2020 7:53 PM EDT  Subject: Visit Follow-Up Question    I am supposed to have an appointment at 4:00 on Monday. I forgot I have to go to the bone doctor at 1:30. The [place] doctor at 3:00. I don't know how long it will take for the first time. I don't know if I will get out in time for our meeting at 4:00. Tavia Dao told me he went over to [place] a friend. He told him he was assometic and had to wait until Tuesday. Tavia Dao had told him a few weeks ago if he wanted chicken . So he calls Saturday knowing he has coronavirus so Tavia Dao takes it over to him knowing he has it ! He said he was sitting on the porch and he played it on the porch. I said did you have a mask on and he said yes! I said you shouldn't have't over there. I call The coroviruse said I should be ok if I have symptoms to call right away!

## 2020-08-03 NOTE — PROGRESS NOTES
[address]  Bone Fragility Follow up     Visit Date: 8/3/2020  MRN: [id number]  Cc:   Chief Complaint   Patient presents with    1 Year Follow Up     AGE RELATED OSTEOPOROSIS       HPI:   Shannan Herron  is a(n)64 y.o. female here today for follow up of Osteoporosis. Last Prolia injection 9/24/2019. Behind on getting injections- was afraid to come to hospital due to COVID-19. Re-explained importance of continuing Prolia on time due to potential backslide of BMP if stopped suddenly. Patient voices understanding and is scheduled for injection this Thursday. No falls, no fractures. Still taking calcium (1000 mg daily) and vit D supplement. ROS:  Review of Systems   Constitutional: Positive for fatigue. Negative for chills and fever. HENT: Negative for congestion and trouble swallowing. Eyes: Positive for itching. Respiratory: Negative for cough and shortness of breath. Cardiovascular: Negative for chest pain and leg swelling. Gastrointestinal: Positive for constipation. Negative for nausea and vomiting. Endocrine: Negative for cold intolerance and heat intolerance. Genitourinary: Negative for difficulty urinating and frequency. Musculoskeletal: Positive for arthralgias, back pain and neck pain. Negative for joint swelling. Skin: Negative for rash and wound. Neurological: Positive for tremors, weakness and numbness (toes). Hematological: Does not bruise/bleed easily. Psychiatric/Behavioral: The patient is nervous/anxious.           PAST MEDICAL HISTORY  Past Medical History:   Diagnosis Date    Allergic rhinitis     Anxiety     CKD stage 4 due to type 2 diabetes mellitus ([facility] [address]     CVA (cerebral infarction)     Depression     Fibromyalgia     Headache(784.0)     Hyperlipidemia     Hypertension     Irritable bowel syndrome     Kidney failure     Unspecified cerebral artery occlusion with cerebral infarction     Unspecified sleep apnea  HYSTERECTOMY      total    NECK SURGERY  18  years ago    fusion    SINUS SURGERY  10 years ago    TUBAL LIGATION         ALLERGIES  Allergies   Allergen Reactions    Actos [Pioglitazone] Nausea And Vomiting    Augmentin [Amoxicillin-Pot Clavulanate] Diarrhea    Ciprofloxacin      unsure    Sulfa Antibiotics Rash       CURRENTMEDICATIONS  Current Outpatient Medications   Medication Sig Dispense Refill    chlordiazePOXIDE-clidinium (LIBRAX) 5-2.5 MG per capsule Take 1 capsule by mouth daily. TAKE 1 CAPSULE TWICE A DAY AS NEEDED FOR PAIN 30 capsule 2    brompheniramine-pseudoephedrine-DM 2-30-10 MG/5ML syrup Take 5 mLs by mouth 4 times daily as needed for Congestion or Cough 140 mL 0    traZODone (DESYREL) 50 MG tablet TAKE 1 TABLET NIGHTLY 90 tablet 0    Probiotic Product (PROBIOTIC-10) CHEW Take by mouth daily      Triamcinolone Acetonide (NASACORT AQ NA) by Nasal route      famotidine (PEPCID) 20 MG tablet Take 1 tablet by mouth 2 times daily 60 tablet 3    linaclotide (LINZESS) 145 MCG capsule Take 145 mcg by mouth every morning (before breakfast)      simvastatin (ZOCOR) 20 MG tablet TAKE 1 TABLET DAILY 90 tablet 1    vitamin D (ERGOCALCIFEROL) 1.25 MG (46495 UT) CAPS capsule Take 1 capsule by mouth once a week 12 capsule 1    clopidogrel (PLAVIX) 75 MG tablet TAKE 1 TABLET DAILY 90 tablet 1    buPROPion (WELLBUTRIN SR) 150 MG extended release tablet TAKE 1 TABLET TWICE A  tablet 1    triamcinolone (KENALOG) 0.025 % cream Apply Topically 15 g 0    triamcinolone (KENALOG) 0.1 % cream Apply topically 2 times daily for up to 2 weeks.  30 g 0    MAGNESIUM CITRATE PO Take by mouth as needed (weekly)      OYSCO 500 + D 500-200 MG-UNIT per tablet TAKE 2 TABLETS BY MOUTH TWICE DAILY 180 tablet 3    denosumab (PROLIA) 60 MG/ML SOLN SC injection Inject 60 mg into the skin once      folic acid (FOLVITE) 799 MCG tablet Take 400 mcg by mouth daily      loratadine (CLARITIN) 10 MG tablet Take 07/06/2020    BUN 26 07/06/2020    CREATININE 1.7 07/06/2020    ALKPHOS 125 03/27/2019    ALT 15 03/27/2019    AST 16 03/27/2019       HgBA1c: No components found for: HGBA1C    Lab Results   Component Value Date    TSH 1.280 10/22/2018     Lab Results   Component Value Date    VITD25 31 01/22/2019       Lab Results   Component Value Date    SEDRATE 28 (H) 02/27/2019     Lab Results   Component Value Date    CRP 0.11 02/27/2019       Lab Results   Component Value Date    VITD25 31 01/22/2019    CALCIUM 9.5 07/06/2020    MG 2.1 02/27/2019     Lab Results   Component Value Date     (H) 07/06/2020    K 4.3 07/06/2020     07/06/2020    CO2 29 07/06/2020    BUN 26 (H) 07/06/2020    CREATININE 1.7 (H) 07/06/2020    GLUCOSE 99 07/06/2020    CALCIUM 9.5 07/06/2020    PROT 6.2 03/27/2019    LABALBU 3.9 03/27/2019    BILITOT 0.2 (L) 03/27/2019    ALKPHOS 125 03/27/2019    AST 16 03/27/2019    ALT 15 03/27/2019         RADIOLOGY:     DEXA  1/17/2019  Left hip: -3.40  Right hip: -4.00  Lumbar spine: -2.50      Assessment and plan:  Assessment/ Plan:  1. Age-related osteoporosis without current pathological fracture  - Continue with Prolia injections- late on repeat injection.   - Check BMP 2 weeks after next injection to closely monitor calcium level  - Continue with current calcium and vitamin D supplements. - Continue with physical therapy and HEP  - Repeat DEXA 1/2021    2. Hypocalcemia  - Recheck BMP after Prolia injection    H/o vitamin D deficiency   - rechecking vitamin D level    3. Medication monitoring encounter  - Comprehensive Metabolic Panel      Electronically signed by CARLOS MANUEL Alves CNP on 8/3/2020 at 1:34 PM      Thank you for allowing me to participate in the care of this patient. Please call if there are any questions.

## 2020-08-04 LAB
IGA: 212 MG/DL (ref 70–400)
IGE: 66 IU/ML
IGG: 685 MG/DL (ref 700–1600)
IGM: 43 MG/DL (ref 40–230)

## 2020-08-06 ENCOUNTER — HOSPITAL ENCOUNTER (OUTPATIENT)
Dept: NURSING | Age: 65
Discharge: HOME | End: 2020-08-06
Payer: MEDICARE

## 2020-08-06 VITALS
WEIGHT: 132 LBS | TEMPERATURE: 97.6 F | BODY MASS INDEX: 25.78 KG/M2 | OXYGEN SATURATION: 100 % | RESPIRATION RATE: 18 BRPM | HEART RATE: 78 BPM | DIASTOLIC BLOOD PRESSURE: 64 MMHG | SYSTOLIC BLOOD PRESSURE: 138 MMHG

## 2020-08-06 DIAGNOSIS — M81.0 AGE-RELATED OSTEOPOROSIS WITHOUT CURRENT PATHOLOGICAL FRACTURE: Primary | ICD-10-CM

## 2020-08-06 PROCEDURE — 96372 THER/PROPH/DIAG INJ SC/IM: CPT

## 2020-08-06 PROCEDURE — 6360000002 HC RX W HCPCS: Performed by: NURSE PRACTITIONER

## 2020-08-06 RX ORDER — METHYLPREDNISOLONE SODIUM SUCCINATE 125 MG/2ML
125 INJECTION, POWDER, LYOPHILIZED, FOR SOLUTION INTRAMUSCULAR; INTRAVENOUS ONCE
Status: CANCELLED | OUTPATIENT
Start: 2021-02-04

## 2020-08-06 RX ORDER — SODIUM CHLORIDE 9 MG/ML
INJECTION, SOLUTION INTRAVENOUS CONTINUOUS
Status: CANCELLED | OUTPATIENT
Start: 2021-02-04

## 2020-08-06 RX ORDER — DIPHENHYDRAMINE HYDROCHLORIDE 50 MG/ML
50 INJECTION INTRAMUSCULAR; INTRAVENOUS ONCE
Status: CANCELLED | OUTPATIENT
Start: 2021-02-04

## 2020-08-06 RX ADMIN — DENOSUMAB 60 MG: 60 INJECTION SUBCUTANEOUS at 09:58

## 2020-08-06 ASSESSMENT — PAIN - FUNCTIONAL ASSESSMENT: PAIN_FUNCTIONAL_ASSESSMENT: 0-10

## 2020-08-06 NOTE — TELEPHONE ENCOUNTER
Amlodipine 10 mg daily was not on the medication list. Patient called in to have a script placed. She has been taking this daily.  Also script pending for Oscal

## 2020-08-06 NOTE — PROGRESS NOTES
0937 Patient arrived to [facility] ambulatory with use of walker for prolia injection with.   Oriented to room and call light  PT RIGHTS AND RESPONSIBILITIES OFFERED TO PT.  1000 Patient discharged to home in stable condition discharge instructions given to patient     _m___ Safety:       (Environmental)  Freda Marisela to environment   Ensure ID band is correct and in place/ allergy band as needed   Assess for fall risk   Initiate fall precautions as applicable (fall band, side rails, etc.)   Call light within reach   Bed in low position/ wheels locked    __m__ Pain:        Assess pain level and characteristics   Administer analgesics as ordered   Assess effectiveness of pain management and report to MD as needed  m  ____ Knowledge Deficit:   Assess baseline knowledge   Provide teaching at level of understanding   Provide teaching via preferred learning method   Evaluate teaching effectiveness    ___m_ Hemodynamic/Respiratory Status:       (Pre and Post Procedure Monitoring)   Assess/Monitor vital signs and LOC   Assess Baseline SpO2 prior to any sedation   Obtain weight/height   Assess vital signs/ LOC until patient meets discharge criteria   Monitor procedure site and notify MD of any issues

## 2020-08-07 RX ORDER — CALCIUM CARBONATE/VITAMIN D3 500MG-5MCG
2 TABLET ORAL 2 TIMES DAILY
Qty: 360 TABLET | Refills: 3 | Status: SHIPPED | OUTPATIENT
Start: 2020-08-07 | End: 2020-10-02 | Stop reason: SDUPTHER

## 2020-08-07 RX ORDER — AMLODIPINE BESYLATE 10 MG/1
10 TABLET ORAL DAILY
Qty: 90 TABLET | Refills: 3 | Status: SHIPPED | OUTPATIENT
Start: 2020-08-07 | End: 2021-01-07 | Stop reason: SDUPTHER

## 2020-08-10 ENCOUNTER — TELEPHONE (OUTPATIENT)
Dept: FAMILY MEDICINE CLINIC | Age: 65
End: 2020-08-10

## 2020-08-10 RX ORDER — CLOPIDOGREL BISULFATE 75 MG/1
TABLET ORAL
Qty: 90 TABLET | Refills: 1 | Status: SHIPPED | OUTPATIENT
Start: 2020-08-10 | End: 2021-01-21 | Stop reason: SDUPTHER

## 2020-08-10 RX ORDER — CHLORDIAZEPOXIDE HYDROCHLORIDE AND CLIDINIUM BROMIDE 5; 2.5 MG/1; MG/1
1 CAPSULE ORAL DAILY
Qty: 30 CAPSULE | Refills: 2 | Status: CANCELLED | OUTPATIENT
Start: 2020-08-10

## 2020-08-10 RX ORDER — ERGOCALCIFEROL 1.25 MG/1
50000 CAPSULE ORAL WEEKLY
Qty: 12 CAPSULE | Refills: 1 | Status: SHIPPED | OUTPATIENT
Start: 2020-08-10 | End: 2021-01-21 | Stop reason: SDUPTHER

## 2020-08-10 RX ORDER — SIMVASTATIN 20 MG
TABLET ORAL
Qty: 90 TABLET | Refills: 1 | Status: SHIPPED | OUTPATIENT
Start: 2020-08-10 | End: 2020-09-08 | Stop reason: SDUPTHER

## 2020-08-10 NOTE — TELEPHONE ENCOUNTER
Bianka Roach called requesting a refill on the following medications:  Requested Prescriptions     Pending Prescriptions Disp Refills    clopidogrel (PLAVIX) 75 MG tablet 90 tablet 1     Sig: TAKE 1 TABLET DAILY    chlordiazePOXIDE-clidinium (LIBRAX) 5-2.5 MG per capsule 30 capsule 2     Sig: Take 1 capsule by mouth daily.  TAKE 1 CAPSULE TWICE A DAY AS NEEDED FOR PAIN    simvastatin (ZOCOR) 20 MG tablet 90 tablet 1     Sig: TAKE 1 TABLET DAILY    vitamin D (ERGOCALCIFEROL) 1.25 MG (97011 UT) CAPS capsule 12 capsule 1     Sig: Take 1 capsule by mouth once a week       Date of last visit: 6/17/2020  Date of next visit (if applicable):N/A  Date of last refill: 02/05/2020  Pharmacy Name: [address] [place] [address]      Henry Jacobs, [address]

## 2020-08-12 ENCOUNTER — PROCEDURE VISIT (OUTPATIENT)
Dept: ALLERGY | Age: 65
End: 2020-08-12
Payer: MEDICARE

## 2020-08-12 VITALS — WEIGHT: 132 LBS | RESPIRATION RATE: 14 BRPM | HEART RATE: 60 BPM | BODY MASS INDEX: 25.78 KG/M2 | TEMPERATURE: 97.6 F

## 2020-08-12 PROCEDURE — G8417 CALC BMI ABV UP PARAM F/U: HCPCS | Performed by: NURSE PRACTITIONER

## 2020-08-12 PROCEDURE — 99213 OFFICE O/P EST LOW 20 MIN: CPT | Performed by: NURSE PRACTITIONER

## 2020-08-12 PROCEDURE — G8427 DOCREV CUR MEDS BY ELIG CLIN: HCPCS | Performed by: NURSE PRACTITIONER

## 2020-08-12 PROCEDURE — 3017F COLORECTAL CA SCREEN DOC REV: CPT | Performed by: NURSE PRACTITIONER

## 2020-08-12 PROCEDURE — 1090F PRES/ABSN URINE INCON ASSESS: CPT | Performed by: NURSE PRACTITIONER

## 2020-08-12 PROCEDURE — 1036F TOBACCO NON-USER: CPT | Performed by: NURSE PRACTITIONER

## 2020-08-12 PROCEDURE — 95004 PERQ TESTS W/ALRGNC XTRCS: CPT | Performed by: NURSE PRACTITIONER

## 2020-08-12 PROCEDURE — 4040F PNEUMOC VAC/ADMIN/RCVD: CPT | Performed by: NURSE PRACTITIONER

## 2020-08-12 PROCEDURE — G8399 PT W/DXA RESULTS DOCUMENT: HCPCS | Performed by: NURSE PRACTITIONER

## 2020-08-12 PROCEDURE — 1123F ACP DISCUSS/DSCN MKR DOCD: CPT | Performed by: NURSE PRACTITIONER

## 2020-08-12 RX ORDER — CETIRIZINE HYDROCHLORIDE 10 MG/1
10 TABLET ORAL DAILY
Qty: 30 TABLET | Refills: 11
Start: 2020-08-12

## 2020-08-12 ASSESSMENT — ENCOUNTER SYMPTOMS
EYE ITCHING: 1
EYE REDNESS: 1
SINUS PRESSURE: 1
RHINORRHEA: 1

## 2020-08-12 NOTE — PROGRESS NOTES
Anxiety     CKD stage 4 due to type 2 diabetes mellitus ([facility] [address]     CVA (cerebral infarction)     Depression     Fibromyalgia     Headache(784.0)     Hyperlipidemia     Hypertension     Irritable bowel syndrome     Kidney failure     Unspecified cerebral artery occlusion with cerebral infarction     Unspecified sleep apnea        Past Surgical History:  Past Surgical History:   Procedure Laterality Date    CARPAL TUNNEL RELEASE Right     [facility]    ENDOSCOPY, COLON, DIAGNOSTIC  unsure    EYE SURGERY      lasik    HEMORRHOID SURGERY  unsure    HYSTERECTOMY      total    NECK SURGERY  18  years ago    fusion    SINUS SURGERY  10 years ago    TUBAL LIGATION         Family History:   Family History   Problem Relation Age of Onset    Diabetes Mother     High Blood Pressure Mother     Heart Disease Mother     Heart Disease Father    Becky [place] Parkinsonism Father     Neurofibromatosis Father    Becky [place] Depression Father     Neurofibromatosis Sister     Neurofibromatosis Brother     Other Brother         multiple sclerosis    Dementia Brother     Diabetes Sister     High Blood Pressure Sister     Depression Sister        Social History:   Social History     Tobacco Use    Smoking status: Never Smoker    Smokeless tobacco: Never Used   Substance Use Topics    Alcohol use: No     Alcohol/week: 0.0 standard drinks        Allergies:  Actos [pioglitazone]; Augmentin [amoxicillin-pot clavulanate];  Ciprofloxacin; and Sulfa antibiotics    CurrentMedications:     Current Outpatient Medications:     cetirizine (ZYRTEC ALLERGY) 10 MG tablet, Take 1 tablet by mouth daily, Disp: 30 tablet, Rfl: 11    clopidogrel (PLAVIX) 75 MG tablet, TAKE 1 TABLET DAILY, Disp: 90 tablet, Rfl: 1    simvastatin (ZOCOR) 20 MG tablet, TAKE 1 TABLET DAILY, Disp: 90 tablet, Rfl: 1    vitamin D (ERGOCALCIFEROL) 1.25 MG (17369 UT) CAPS capsule, Take 1 capsule by mouth once a week, Disp: 12 capsule, Rfl: 1   Panel G Epicutaneous    Site Allergen W (mm) F  Site Allergen W (mm) F   G61 Soybean Food 0 3        G62 Wheat, Whole Food 0 3            62 Panel Skin test performed. All results interpreted as 0 mm unless noted above. Controls performed with Histamine (positive) and Glycerin (negative). Assessment/Orders:    Diagnosis Orders   1. Non-seasonal allergic rhinitis, unspecified trigger  cetirizine (ZYRTEC ALLERGY) 10 MG tablet   2. Low serum IgG for age  IgG   3. Low hemoglobin  CBC Auto Differential   4. Allergy to trees     5. Allergy to dog dander     6. Cat allergies         Plan:  Follow Up:6 weeks    We will repeat IgG in 6 to 8 weeks along with CBC. Patient instructed to follow-up with PCP regarding anemia and to recheck her CBC  If IgG remains low will work-up for C VID  Patient to restart on her antihistamines.   I have switch patient from loratadine to Zyrtec      Total time sent with patient 30 minutes with greater than 50% in patient education    (Please note that portions of this note may have been completed with a voice recognition program.  Efforts were made to edit the dictation but occasionally words are mis-transcribed.)         Signed:  Pilar Guillermo, APRN - CNP  8/12/2020  2:39 PM

## 2020-08-17 ENCOUNTER — OFFICE VISIT (OUTPATIENT)
Dept: PSYCHOLOGY | Age: 65
End: 2020-08-17
Payer: MEDICARE

## 2020-08-17 PROCEDURE — 90837 PSYTX W PT 60 MINUTES: CPT | Performed by: PSYCHOLOGIST

## 2020-08-17 ASSESSMENT — PATIENT HEALTH QUESTIONNAIRE - PHQ9
1. LITTLE INTEREST OR PLEASURE IN DOING THINGS: 0
2. FEELING DOWN, DEPRESSED OR HOPELESS: 1
SUM OF ALL RESPONSES TO PHQ QUESTIONS 1-9: 3
5. POOR APPETITE OR OVEREATING: 0
9. THOUGHTS THAT YOU WOULD BE BETTER OFF DEAD, OR OF HURTING YOURSELF: 0
SUM OF ALL RESPONSES TO PHQ9 QUESTIONS 1 & 2: 1
8. MOVING OR SPEAKING SO SLOWLY THAT OTHER PEOPLE COULD HAVE NOTICED. OR THE OPPOSITE, BEING SO FIGETY OR RESTLESS THAT YOU HAVE BEEN MOVING AROUND A LOT MORE THAN USUAL: 0
4. FEELING TIRED OR HAVING LITTLE ENERGY: 1
3. TROUBLE FALLING OR STAYING ASLEEP: 0
SUM OF ALL RESPONSES TO PHQ QUESTIONS 1-9: 3
6. FEELING BAD ABOUT YOURSELF - OR THAT YOU ARE A FAILURE OR HAVE LET YOURSELF OR YOUR FAMILY DOWN: 1
7. TROUBLE CONCENTRATING ON THINGS, SUCH AS READING THE NEWSPAPER OR WATCHING TELEVISION: 0
10. IF YOU CHECKED OFF ANY PROBLEMS, HOW DIFFICULT HAVE THESE PROBLEMS MADE IT FOR YOU TO DO YOUR WORK, TAKE CARE OF THINGS AT HOME, OR GET ALONG WITH OTHER PEOPLE: 0

## 2020-08-17 ASSESSMENT — ANXIETY QUESTIONNAIRES
4. TROUBLE RELAXING: 3-NEARLY EVERY DAY
5. BEING SO RESTLESS THAT IT IS HARD TO SIT STILL: 0-NOT AT ALL
GAD7 TOTAL SCORE: 16
6. BECOMING EASILY ANNOYED OR IRRITABLE: 3-NEARLY EVERY DAY
3. WORRYING TOO MUCH ABOUT DIFFERENT THINGS: 3-NEARLY EVERY DAY
2. NOT BEING ABLE TO STOP OR CONTROL WORRYING: 3-NEARLY EVERY DAY
1. FEELING NERVOUS, ANXIOUS, OR ON EDGE: 3-NEARLY EVERY DAY
7. FEELING AFRAID AS IF SOMETHING AWFUL MIGHT HAPPEN: 1-SEVERAL DAYS

## 2020-08-17 NOTE — PROGRESS NOTES
[facility]  [address]  [address] [place] [address]  [address]  Dept: [phone]  Dept Fax: [phone]: [phone]    Patient: Logan Desouza  Visit Date: 2020  Referring Provider:   No ref. provider found    SUBJECTIVE DATA     CHIEF COMPLAINT:    Chief Complaint   Patient presents with    Anxiety    Depression    Stress       Patient update: This session was conducted as a face-to-face meeting, per patient's request, with appropriate social distancing and both patient and psychologist wearing masks. Patient reports things have been difficult for her at home.  Bethany Persaud has always been acting like he's in charge, for instance saying that their A/C needs to be on 75 degrees, even when she is too hot. Feels he disrespects her opinion, but then he will \"be nice\" suddenly. We talked about how to set better boundaries and make the best of a difficult situation. Has a lot of distrust because of how Bethany Persaud lies. Still a lot of anxious thoughts and feelings about stepdaughter Manas Quispeorn that her brother Cornelia Murrieta has some kind of skin condition, and a friend told her what it was, and then another friend told her that condition is what her   of, so patient proceeded to grieve that she was going to lose her brother to this condition. We did some work on those cognitions. Thinks he is depressed    Goes over to see recently- friend Gwen Nolen a couple of times a week, takes her food. Concerned about how to be there for Gwen Nolen, who had a severe CVA and has been really depressed for years. Got allergy tested, and learned she has allergies only to cats, dogs, and the mountain cedar plant. Doctor changed her meds to Zyrtec from Claritin. Trying to find ways to reduce allergen load at home. Also learned that she is iron-deficient. OBJECTIVE DATA     Physical Exam:    Mental Status Evaluation:   Orientation: Alert, oriented. Mood:. anxious, irritable      Affect:  anxious      Appearance:  Normal   Activity:  Normal   Gait/Posture: Halting, as per usual   Speech:  Normal, talkative   Thought Process:  within normal limits   Thought Content: Within Normal Limits   Cognition:  Normal   Memory: Normal   Insight:  good   Judgment: Normal   Suicidal Ideations: Denies Suicidal Ideations   Homicidal Ideations: Denies Homicidal Ideations   Medication Side Effects: absent       Attention Span Normal     Screenings Completed in This Encounter:     GABBY-7  Feeling nervous, anxious, or on edge: 3-Nearly every day  Not able to stop or control worrying: 3-Nearly every day  Worrying too much about different things: 3-Nearly every day  Trouble relaxing: 3-Nearly every day  Being so restless that it's hard to sit still: 0-Not at all  Becoming easily annoyed or irritable: 3-Nearly every day  Feeling afraid as if something awful might happen: 1-Several days  GABBY-7 Total Score: 16    PHQ-2 Over the past 2 weeks, how often have you been bothered by any of the following problems? Little interest or pleasure in doing things: Not at all  Feeling down, depressed, or hopeless: Several days  PHQ-2 Score: 1  PHQ-9 Over the past 2 weeks, how often have you been bothered by any of the following problems? Trouble falling or staying asleep, or sleeping too much: Not at all  Feeling tired or having little energy: Several days  Poor appetite or overeating: Not at all  Feeling bad about yourself - or that you are a failure or have let yourself or your family down: Several days(Feels bad about having to be in a wheelchair for long times)  Trouble concentrating on things, such as reading the newspaper or watching television: Not at all  Moving or speaking so slowly that other people could have noticed.  Or the opposite - being so fidgety or restless that you have been moving around a lot more than usual: Not at all  Thoughts that you would be better off dead, or of hurting yourself in some way: Not at all  If you checked off any problems, how difficult have these problems made it for you to do your work, take care of things at home, or get along with other people?: Not difficult at all  PHQ-9 Total Score: 3                        DIAGNOSIS AND ASSESSMENT DATA     DIAGNOSIS:  See visit diagnoses. 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 Doing OK with maintenance sessions. · Anxiety continues to be an issue, with reassurance-seeking her most common coping strategy. I want her to use cognitive reframing more freely. · Focus on meaning & purpose, being there for others, which she is quite good at  · Difficulty ending sessions. ASSESSMENT/PLAN   Follow-up:  Return in about 2 weeks (around 8/31/2020). Resume good work at [employer], when possible. Resume use of  [employer] hypnosis recording (on phone)  Has a very strong emotional/social support system, which is one of her great strengths. Needs to tap into that to work on the distress with her   Has re-started bright light therapy, using it regularly  Put some limits on your   Let go of the anxiety about Hardik Gilmore on [employer] assignment before next session:     [] Practice deep breathing 1-2 times per day for 15 minutes, as demonstrated in session, and/or:    [] Go to [employer]" web site and begin practicing with their free meditation podcasts    [x] Track thoughts that you think feed anxiety or depression    [] Go to [employer]\" web site, open up the \"Workbooks\" tab, and select a problem from the list that best suits your needs. Review the first module. [x] Begin to track physical activity.  Even five minutes per day will be helpful.    [] Talk with your physician about whether it's OK to start fish oil (burpless) or flax oil, 1000 to 1200 mg per day    [] Most importantly, remember this guideline: \"Don't believe everything you think! \"   :)    [] Try \"Expressive Writing\" using the guidelines on the handout    [x] Start yoga class, as discussed. 1. Continue work at [employer]. Let the trainers tailor a program to your needs (once feasible)  2. Continue enjoying time with friends on a regular basis. 3. Assert yourself when it comes to [place] [employer] and the finances. 4. Practice daily relaxation training, again. 5. Start journaling  6. Re-start yoga for multiple physical issues  7. Continue to focus on improving quality of life, for now  8. Keep your phone with you at all times, so as to be able to call for help if you fall. 9. Regular social time  10. Recognize your strengths, which include facilitating connections between other people. 11. Restart bright light therapy once the weather gets gloomy  12. Continue depression support group attendance  15. Do more walking and social events  14. Reframe cognitions about marriage--how to make things work  15. Spend more time with the friends who make you laugh  12. Remind yourself that [facility]un Paul and [facility] will never let you starve or be homeless-- you have good supports and it's OK to rely on them as needed. 17. Continue to use the card I gave you, with the coping strategies. Continue with better assertion re: health needs. Continue with self-care and \"Serenity Prayer\" for letting go of things you cannot control or fix. Try to let [facility] of [facility] from the past    See items under \"Plan,\" above. Session lasted 56 minutes.     Provider Signature:  Electronically signed by Mariah Carrillo, PhD on 8/17/2020 at 10:50 AM

## 2020-08-31 ENCOUNTER — TELEPHONE (OUTPATIENT)
Dept: PSYCHOLOGY | Age: 65
End: 2020-08-31

## 2020-09-03 ENCOUNTER — HOSPITAL ENCOUNTER (EMERGENCY)
Age: 65
Discharge: HOME | End: 2020-09-03
Attending: EMERGENCY MEDICINE
Payer: MEDICARE

## 2020-09-03 ENCOUNTER — APPOINTMENT (OUTPATIENT)
Dept: GENERAL RADIOLOGY | Age: 65
End: 2020-09-03
Payer: MEDICARE

## 2020-09-03 VITALS
OXYGEN SATURATION: 99 % | RESPIRATION RATE: 17 BRPM | TEMPERATURE: 98.7 F | DIASTOLIC BLOOD PRESSURE: 72 MMHG | HEART RATE: 68 BPM | SYSTOLIC BLOOD PRESSURE: 138 MMHG

## 2020-09-03 LAB
ALBUMIN SERPL-MCNC: 4 G/DL (ref 3.5–5.1)
ALP BLD-CCNC: 82 U/L (ref 38–126)
ALT SERPL-CCNC: 21 U/L (ref 11–66)
ANION GAP SERPL CALCULATED.3IONS-SCNC: 11 MEQ/L (ref 8–16)
AST SERPL-CCNC: 24 U/L (ref 5–40)
BASOPHILS # BLD: 0.9 %
BASOPHILS ABSOLUTE: 0.1 THOU/MM3 (ref 0–0.1)
BILIRUB SERPL-MCNC: 0.2 MG/DL (ref 0.3–1.2)
BUN BLDV-MCNC: 21 MG/DL (ref 7–22)
CALCIUM SERPL-MCNC: 9.2 MG/DL (ref 8.5–10.5)
CHLORIDE BLD-SCNC: 109 MEQ/L (ref 98–111)
CO2: 26 MEQ/L (ref 23–33)
CREAT SERPL-MCNC: 1.3 MG/DL (ref 0.4–1.2)
EKG ATRIAL RATE: 72 BPM
EKG P AXIS: 14 DEGREES
EKG P-R INTERVAL: 128 MS
EKG Q-T INTERVAL: 382 MS
EKG QRS DURATION: 98 MS
EKG QTC CALCULATION (BAZETT): 418 MS
EKG R AXIS: 73 DEGREES
EKG T AXIS: 56 DEGREES
EKG VENTRICULAR RATE: 72 BPM
EOSINOPHIL # BLD: 1.6 %
EOSINOPHILS ABSOLUTE: 0.1 THOU/MM3 (ref 0–0.4)
ERYTHROCYTE [DISTWIDTH] IN BLOOD BY AUTOMATED COUNT: 12.5 % (ref 11.5–14.5)
ERYTHROCYTE [DISTWIDTH] IN BLOOD BY AUTOMATED COUNT: 44.5 FL (ref 35–45)
GFR SERPL CREATININE-BSD FRML MDRD: 41 ML/MIN/1.73M2
GLUCOSE BLD-MCNC: 84 MG/DL (ref 70–108)
HCT VFR BLD CALC: 37.6 % (ref 37–47)
HEMOGLOBIN: 11.8 GM/DL (ref 12–16)
IMMATURE GRANS (ABS): 0.03 THOU/MM3 (ref 0–0.07)
IMMATURE GRANULOCYTES: 0.5 %
LYMPHOCYTES # BLD: 29.4 %
LYMPHOCYTES ABSOLUTE: 1.9 THOU/MM3 (ref 1–4.8)
MCH RBC QN AUTO: 30.5 PG (ref 26–33)
MCHC RBC AUTO-ENTMCNC: 31.4 GM/DL (ref 32.2–35.5)
MCV RBC AUTO: 97.2 FL (ref 81–99)
MONOCYTES # BLD: 6.8 %
MONOCYTES ABSOLUTE: 0.4 THOU/MM3 (ref 0.4–1.3)
NUCLEATED RED BLOOD CELLS: 0 /100 WBC
OSMOLALITY CALCULATION: 292.7 MOSMOL/KG (ref 275–300)
PLATELET # BLD: 239 THOU/MM3 (ref 130–400)
PMV BLD AUTO: 10.3 FL (ref 9.4–12.4)
POTASSIUM REFLEX MAGNESIUM: 4.1 MEQ/L (ref 3.5–5.2)
PRO-BNP: 165.6 PG/ML (ref 0–900)
RBC # BLD: 3.87 MILL/MM3 (ref 4.2–5.4)
SEG NEUTROPHILS: 60.8 %
SEGMENTED NEUTROPHILS ABSOLUTE COUNT: 3.8 THOU/MM3 (ref 1.8–7.7)
SODIUM BLD-SCNC: 146 MEQ/L (ref 135–145)
TOTAL PROTEIN: 6.5 G/DL (ref 6.1–8)
TROPONIN T: < 0.01 NG/ML
TROPONIN T: < 0.01 NG/ML
WBC # BLD: 6.3 THOU/MM3 (ref 4.8–10.8)

## 2020-09-03 PROCEDURE — 85025 COMPLETE CBC W/AUTO DIFF WBC: CPT

## 2020-09-03 PROCEDURE — 71045 X-RAY EXAM CHEST 1 VIEW: CPT

## 2020-09-03 PROCEDURE — 93005 ELECTROCARDIOGRAM TRACING: CPT | Performed by: EMERGENCY MEDICINE

## 2020-09-03 PROCEDURE — 93010 ELECTROCARDIOGRAM REPORT: CPT | Performed by: INTERNAL MEDICINE

## 2020-09-03 PROCEDURE — 84484 ASSAY OF TROPONIN QUANT: CPT

## 2020-09-03 PROCEDURE — 99283 EMERGENCY DEPT VISIT LOW MDM: CPT

## 2020-09-03 PROCEDURE — 83880 ASSAY OF NATRIURETIC PEPTIDE: CPT

## 2020-09-03 PROCEDURE — 80053 COMPREHEN METABOLIC PANEL: CPT

## 2020-09-03 PROCEDURE — 36415 COLL VENOUS BLD VENIPUNCTURE: CPT

## 2020-09-03 NOTE — ED PROVIDER NOTES
[facility] EMERGENCY DEPT      CHIEF COMPLAINT       Chief Complaint   Patient presents with    Chest Pain    Arm Pain    Anxiety       Nurses Notes reviewed and I agree except as noted in the HPI. HISTORY OF PRESENT ILLNESS    Rakan Asher is a 75 y.o. female who presents complaint of right chest pain under her left breast.  Intermittent sharp, short-lived. Nothing seems to make the chest pain worse or better. Pain sometimes radiates into the left arm, or independently appears on the left arm. Patient has no known history of CAD. States that she had a stress test years ago, which she passed. Patient is currently pain-free. REVIEW OF SYSTEMS      Review of Systems   Constitutional: Negative for fever, chills, diaphoresis and fatigue. HENT: Negative for congestion, drooling, facial swelling and sore throat. Eyes: Negative for photophobia, pain and discharge. Respiratory: Negative for cough, shortness of breath, wheezing and stridor. Cardiovascular: Positive for chest pain; negative for palpitations and leg swelling. Gastrointestinal: Negative for abdominal pain, blood in stool and abdominal distention. Genitourinary: Negative for dysuria, urgency, hematuria and difficulty urinating. Musculoskeletal: Negative for gait problem, neck pain and neck stiffness. Skin; No rash, No itching  Neurological: Negative for seizures, weakness and numbness. Psychiatric/Behavioral: Negative for hallucinations, confusion and agitation. PAST MEDICAL HISTORY    has a past medical history of Allergic rhinitis, Anxiety, CKD stage 4 due to type 2 diabetes mellitus ([facility] [address] CVA (cerebral infarction), Depression, Fibromyalgia, Headache(784.0), Hyperlipidemia, Hypertension, Irritable bowel syndrome, Kidney failure, Unspecified cerebral artery occlusion with cerebral infarction, and Unspecified sleep apnea.     SURGICAL HISTORY      has a past surgical history that includes sinus surgery (10 years ago); Hemorrhoid surgery (unsure); Hysterectomy; Neck surgery (18  years ago); eye surgery; Endoscopy, colon, diagnostic (unsure); Colonoscopy (2012); Tubal ligation; and Carpal tunnel release (Right). CURRENT MEDICATIONS       Previous Medications    AMLODIPINE (NORVASC) 10 MG TABLET    Take 1 tablet by mouth daily    ASPIRIN 81 MG TABLET    Take 81 mg by mouth daily. B COMPLEX VITAMINS CAPSULE    Take 1 capsule by mouth daily    BROMPHENIRAMINE-PSEUDOEPHEDRINE-DM 2-30-10 MG/5ML SYRUP    Take 5 mLs by mouth 4 times daily as needed for Congestion or Cough    BUPROPION (WELLBUTRIN SR) 150 MG EXTENDED RELEASE TABLET    TAKE 1 TABLET TWICE A DAY    CALCIUM-CHOLECALCIFEROL (OYSCO 500 + D) 500-200 MG-UNIT PER TABLET    Take 2 tablets by mouth 2 times daily    CETIRIZINE (ZYRTEC ALLERGY) 10 MG TABLET    Take 1 tablet by mouth daily    CHLORDIAZEPOXIDE-CLIDINIUM (LIBRAX) 5-2.5 MG PER CAPSULE    Take 1 capsule by mouth daily.  TAKE 1 CAPSULE TWICE A DAY AS NEEDED FOR PAIN    CLOPIDOGREL (PLAVIX) 75 MG TABLET    TAKE 1 TABLET DAILY    DENOSUMAB (PROLIA) 60 MG/ML SOLN SC INJECTION    Inject 60 mg into the skin once    FAMOTIDINE (PEPCID) 20 MG TABLET    Take 1 tablet by mouth 2 times daily    FISH OIL    1,000 mg by Does not apply route 2 times daily Indications: Decreased Energy     FOLIC ACID (FOLVITE) 607 MCG TABLET    Take 400 mcg by mouth daily    LINACLOTIDE (LINZESS) 145 MCG CAPSULE    Take 145 mcg by mouth every morning (before breakfast)    MAGNESIUM CITRATE PO    Take by mouth as needed (weekly)    PROBIOTIC PRODUCT (PROBIOTIC-10) CHEW    Take by mouth daily    SIMVASTATIN (ZOCOR) 20 MG TABLET    TAKE 1 TABLET DAILY    TETRAHYDROZOLINE-ZN SULFATE (EYE DROPS ALLERGY RELIEF OP)    Apply 1 drop to eye daily    TRAZODONE (DESYREL) 50 MG TABLET    TAKE 1 TABLET NIGHTLY    TRIAMCINOLONE ACETONIDE (NASACORT AQ NA)    by Nasal route    VITAMIN D (ERGOCALCIFEROL) 1.25 MG (69390 UT) CAPS CAPSULE    Take 1 capsule by mouth once a week       ALLERGIES     is allergic to actos [pioglitazone]; augmentin [amoxicillin-pot clavulanate]; ciprofloxacin; and sulfa antibiotics. FAMILY HISTORY     She indicated that her mother is . She indicated that her father is . She indicated that all of her three sisters are alive. She indicated that her brother is . family history includes Dementia in her brother; Depression in her father and sister; Diabetes in her mother and sister; Heart Disease in her father and mother; High Blood Pressure in her mother and sister; Neurofibromatosis in her brother, father, and sister; Other in her brother; Parkinsonism in her father. SOCIAL HISTORY      reports that she has never smoked. She has never used smokeless tobacco. She reports that she does not drink alcohol or use drugs. PHYSICAL EXAM     INITIAL VITALS:  oral temperature is 98.7 °F (37.1 °C). Her blood pressure is 138/72 and her pulse is 68. Her respiration is 17 and oxygen saturation is 99%. Physical Exam   Constitutional:  well-developed and well-nourished. HENT: Head: Normocephalic, atraumatic, Bilateral external ears normal, Oropharynx mosit, No oral exudates, Nose normal.   Eyes: PERRL, EOMI, Conjunctiva normal, No discharge. No scleral icterus  Neck: Normal range of motion, No tenderness, Supple  Cardiovascular: Normal rate, regular rhythm, S1 normal and S2 normal.  Exam reveals no gallop. Pulmonary/Chest: Effort normal and breath sounds normal. No accessory muscle usage or stridor. No respiratory distress. no wheezes. has no rales. exhibits no tenderness. Abdominal: Soft. Bowel sounds are normal.  exhibits no distension. There is no tenderness. There is no rebound and no guarding. Extremities: No edema, no tenderness, no cyanosis, no clubbing. Musculoskeletal: Good range of motion in major joints is observed. No major deformities noted.   Neurological: Alert and oriented ×3, patient has residual weakness and sensory deficits to left side from a previous stroke. Mark Rubioers GCS 15  Skin: Skin is warm, dry and intact. No rash noted. No erythema. Psychiatric: Affect normal, judgment normal, mood normal.  DIFFERENTIAL DIAGNOSIS:   ACS, chest wall pain, pneumonia, PE, thoracic aortic dissection    DIAGNOSTIC RESULTS     EKG: All EKG's are interpreted by the Emergency Department Physician who either signs or Co-signs this chart in the absence of a cardiologist.      RADIOLOGY: non-plain film images(s) such as CT, Ultrasound and MRI are read by the radiologist.  Plain radiographic images are visualized and preliminarily interpreted by the emergency physician unless otherwise stated below. LABS:   Labs Reviewed   CBC WITH AUTO DIFFERENTIAL - Abnormal; Notable for the following components:       Result Value    RBC 3.87 (*)     Hemoglobin 11.8 (*)     MCHC 31.4 (*)     All other components within normal limits   COMPREHENSIVE METABOLIC PANEL W/ REFLEX TO MG FOR LOW K - Abnormal; Notable for the following components:    CREATININE 1.3 (*)     Sodium 146 (*)     Total Bilirubin 0.2 (*)     All other components within normal limits   GLOMERULAR FILTRATION RATE, ESTIMATED - Abnormal; Notable for the following components:    Est, Glom Filt Rate 41 (*)     All other components within normal limits   TROPONIN   BRAIN NATRIURETIC PEPTIDE   ANION GAP   OSMOLALITY   TROPONIN       EMERGENCY DEPARTMENT COURSE:   Vitals:    Vitals:    09/03/20 1126 09/03/20 1326   BP: 138/72    Pulse: 73 68   Resp: 17 17   Temp: 98.7 °F (37.1 °C)    TempSrc: Oral    SpO2: 99% 99%     Patient presenting with complaint of left chest pain, sharp in nature, short-lived. Negative chest pain work-up in the ED, will discharge patient home with cardiology follow-up tomorrow. CRITICAL CARE:       CONSULTS:  None    PROCEDURES:  None    FINAL IMPRESSION      1.  Other chest pain          DISPOSITION/PLAN   Decision To Discharge    PATIENT REFERRED TO:  [facility]  [facility] [address]  [place] [address]  [phone]  Schedule an appointment as soon as possible for a visit in 1 day  RE-CHECK AT 10:30      DISCHARGE MEDICATIONS:  New Prescriptions    No medications on file       (Please note that portions of this note were completed with a voice recognition program.  Efforts were made to edit the dictations but occasionally words are mis-transcribed.)    Casandra Fraire, [address] [place] [address],   09/03/20 [id number]

## 2020-09-03 NOTE — ED NOTES
Bed: 001A  Expected date: 9/3/20  Expected time:   Means of arrival:   Comments:      Mario Hidalgo RN  09/03/20 [id number]
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
## 2020-09-04 ENCOUNTER — TELEPHONE (OUTPATIENT)
Dept: CARDIOLOGY CLINIC | Age: 65
End: 2020-09-04

## 2020-09-04 ENCOUNTER — OFFICE VISIT (OUTPATIENT)
Dept: CARDIOLOGY CLINIC | Age: 65
End: 2020-09-04
Payer: MEDICARE

## 2020-09-04 VITALS
DIASTOLIC BLOOD PRESSURE: 80 MMHG | SYSTOLIC BLOOD PRESSURE: 127 MMHG | BODY MASS INDEX: 25 KG/M2 | WEIGHT: 128 LBS | HEART RATE: 116 BPM

## 2020-09-04 PROBLEM — R94.31 ABNORMAL EKG: Status: ACTIVE | Noted: 2020-09-04

## 2020-09-04 PROCEDURE — 3017F COLORECTAL CA SCREEN DOC REV: CPT | Performed by: INTERNAL MEDICINE

## 2020-09-04 PROCEDURE — 99204 OFFICE O/P NEW MOD 45 MIN: CPT | Performed by: INTERNAL MEDICINE

## 2020-09-04 PROCEDURE — G8399 PT W/DXA RESULTS DOCUMENT: HCPCS | Performed by: INTERNAL MEDICINE

## 2020-09-04 PROCEDURE — 1090F PRES/ABSN URINE INCON ASSESS: CPT | Performed by: INTERNAL MEDICINE

## 2020-09-04 PROCEDURE — 1123F ACP DISCUSS/DSCN MKR DOCD: CPT | Performed by: INTERNAL MEDICINE

## 2020-09-04 PROCEDURE — G8427 DOCREV CUR MEDS BY ELIG CLIN: HCPCS | Performed by: INTERNAL MEDICINE

## 2020-09-04 PROCEDURE — 4040F PNEUMOC VAC/ADMIN/RCVD: CPT | Performed by: INTERNAL MEDICINE

## 2020-09-04 PROCEDURE — 1036F TOBACCO NON-USER: CPT | Performed by: INTERNAL MEDICINE

## 2020-09-04 PROCEDURE — G8417 CALC BMI ABV UP PARAM F/U: HCPCS | Performed by: INTERNAL MEDICINE

## 2020-09-04 RX ORDER — PREDNISONE 20 MG/1
40 TABLET ORAL DAILY
Qty: 10 TABLET | Refills: 0 | Status: SHIPPED | OUTPATIENT
Start: 2020-09-04 | End: 2020-09-07

## 2020-09-04 NOTE — PROGRESS NOTES
Chief Complaint   Patient presents with   [address] from [address] [place] [address] Patient       Send from ED for chest pain    Chest Pain   Patient complains of chest pain. For the last 3 days . Retrosternal, sudden in onset, Symptoms have been unchanged since that time. The patient's pain is constant. The patient describes the pain as pressure, sharp and radiates to the left arm. Patient rates pain as a 8/10 in intensity. Associated symptoms are: none. Aggravating factors are: coughing. Alleviating factors are: none. CP last con stant mostly for 3 days    Denied sob, palpitations or edema     EKG was done in ER.  Dizziness    Orthostatics done on her today in office    Hx of back pain and Osteoarthritis    Hx of CVA 8 yrs back with residual weakness on left leg    CKD IV    Nonsmoker    FHX  Father had MI at 45 yrs  Mother had MVP    Patient Active Problem List   Diagnosis    Cerebral infarction [facility])    Chest pain, atypical- tenderness on the left lower chest wall    Hyperlipidemia    Irritable bowel syndrome    Abdominal adhesions    Allergic rhinitis    Essential hypertension    Generalized anxiety disorder    CKD (chronic kidney disease), stage IV ([facility])    CKD (chronic kidney disease) stage 3, GFR 30-59 ml/min ([facility])    Age-related osteoporosis without current pathological fracture    Major depression, recurrent ([address]    Environmental and seasonal allergies    Hearing loss of right ear due to cerumen impaction    Recurrent sinusitis    Abnormal EKG       Past Surgical History:   Procedure Laterality Date    CARPAL TUNNEL RELEASE Right     COLONOSCOPY  2012    [facility]    ENDOSCOPY, COLON, DIAGNOSTIC  unsure    EYE SURGERY      lasik    HEMORRHOID SURGERY  unsure    HYSTERECTOMY      total    NECK SURGERY  18  years ago    fusion    SINUS SURGERY  10 years ago    TUBAL LIGATION         Allergies   Allergen Reactions    Actos [Pioglitazone] Nausea And Vomiting    Augmentin [Amoxicillin-Pot Clavulanate] Diarrhea    Ciprofloxacin      unsure    Sulfa Antibiotics Rash        Family History   Problem Relation Age of Onset    Diabetes Mother     High Blood Pressure Mother     Heart Disease Mother     Heart Disease Father    [facility] Parkinsonism Father    [facility] Neurofibromatosis Father    [facility] Depression Father     Neurofibromatosis Sister     Neurofibromatosis Brother     Other Brother         multiple sclerosis    Dementia Brother     Diabetes Sister     High Blood Pressure Sister     Depression Sister         Social History     Socioeconomic History    Marital status:      Spouse name: Not on file    Number of children: Not on file    Years of education: Not on file    Highest education level: Not on file   Occupational History    Occupation: unemployed at present time   Social Needs    Financial resource strain: Not on file    Food insecurity     Worry: Not on file     Inability: Not on file   [employer] needs     Medical: Not on file     Non-medical: Not on file   Tobacco Use    Smoking status: Never Smoker    Smokeless tobacco: Never Used   Substance and Sexual Activity    Alcohol use: No     Alcohol/week: 0.0 standard drinks    Drug use: No    Sexual activity: Not Currently   Lifestyle    Physical activity     Days per week: Not on file     Minutes per session: Not on file    Stress: Not on file   Relationships    Social connections     Talks on phone: Not on file     Gets together: Not on file     Attends [employer] service: Not on file     Active member of club or organization: Not on file     Attends meetings of clubs or organizations: Not on file     Relationship status: Not on file    Intimate partner violence     Fear of current or ex partner: Not on file     Emotionally abused: Not on file     Physically abused: Not on file     Forced sexual activity: Not on file   Other Topics Concern    Not on file   Social History Narrative    Not on file       Current Outpatient Medications   Medication Sig Dispense Refill    cetirizine (ZYRTEC ALLERGY) 10 MG tablet Take 1 tablet by mouth daily 30 tablet 11    clopidogrel (PLAVIX) 75 MG tablet TAKE 1 TABLET DAILY 90 tablet 1    simvastatin (ZOCOR) 20 MG tablet TAKE 1 TABLET DAILY 90 tablet 1    vitamin D (ERGOCALCIFEROL) 1.25 MG (94376 UT) CAPS capsule Take 1 capsule by mouth once a week 12 capsule 1    amLODIPine (NORVASC) 10 MG tablet Take 1 tablet by mouth daily 90 tablet 3    calcium-cholecalciferol (OYSCO 500 + D) 500-200 MG-UNIT per tablet Take 2 tablets by mouth 2 times daily 360 tablet 3    Tetrahydrozoline-Zn Sulfate (EYE DROPS ALLERGY RELIEF OP) Apply 1 drop to eye daily      chlordiazePOXIDE-clidinium (LIBRAX) 5-2.5 MG per capsule Take 1 capsule by mouth daily. TAKE 1 CAPSULE TWICE A DAY AS NEEDED FOR PAIN 30 capsule 2    brompheniramine-pseudoephedrine-DM 2-30-10 MG/5ML syrup Take 5 mLs by mouth 4 times daily as needed for Congestion or Cough 140 mL 0    traZODone (DESYREL) 50 MG tablet TAKE 1 TABLET NIGHTLY 90 tablet 0    Probiotic Product (PROBIOTIC-10) CHEW Take by mouth daily      Triamcinolone Acetonide (NASACORT AQ NA) by Nasal route      famotidine (PEPCID) 20 MG tablet Take 1 tablet by mouth 2 times daily 60 tablet 3    linaclotide (LINZESS) 145 MCG capsule Take 145 mcg by mouth every morning (before breakfast)      buPROPion (WELLBUTRIN SR) 150 MG extended release tablet TAKE 1 TABLET TWICE A  tablet 1    MAGNESIUM CITRATE PO Take by mouth as needed (weekly)      denosumab (PROLIA) 60 MG/ML SOLN SC injection Inject 60 mg into the skin once      folic acid (FOLVITE) 174 MCG tablet Take 400 mcg by mouth daily      b complex vitamins capsule Take 1 capsule by mouth daily 100 capsule 3    FISH OIL 1,000 mg by Does not apply route 2 times daily Indications: Decreased Energy       aspirin 81 MG tablet Take 81 mg by mouth daily.          No current facility-administered medications for this visit. Review of Systems -     General ROS: negative  Psychological ROS: negative  Hematological and Lymphatic ROS: No history of blood clots or bleeding disorder. Respiratory ROS: no cough,  or wheezing, the rest see HPI  Cardiovascular ROS: See HPI  Gastrointestinal ROS: negative  Genito-Urinary ROS: no dysuria, trouble voiding, or hematuria  Musculoskeletal ROS: negative  Neurological ROS: no TIA or stroke symptoms  Dermatological ROS: negative      Blood pressure 127/80, pulse 116, weight 128 lb (58.1 kg), currently breastfeeding. Physical Examination:    General appearance - alert, well appearing, and in no distress  HEENT- Pink conjunctiva  , Non-icteri sclera,PERRLA  Mental status - alert, oriented to person, place, and time  Neck - supple, no significant adenopathy, no JVD, or carotid bruits  Chest - clear to auscultation, no wheezes, rales or rhonchi, symmetric air entry  Heart - normal rate, regular rhythm, normal S1, S2, no murmurs, rubs, clicks or gallops  Abdomen - soft, nontender, nondistended, no masses or organomegaly  DAMION- no CVA or flank tenderness, no suprapubic tenderness  Neurological - alert, oriented, normal speech, no focal findings or movement disorder noted  Musculoskeletal/limbs - no joint tenderness, deformity or swelling   - peripheral pulses normal, no pedal edema, no clubbing or cyanosis  Skin - normal coloration and turgor, no rashes, no suspicious skin lesions noted  Psych- appropriate mood and affect    Lab  No results for input(s): CKTOTAL, CKMB, CKMBINDEX, TROPONINI in the last 72 hours.   CBC:   Lab Results   Component Value Date    WBC 6.3 09/03/2020    RBC 3.87 09/03/2020    HGB 11.8 09/03/2020    HCT 37.6 09/03/2020    MCV 97.2 09/03/2020    MCH 30.5 09/03/2020    MCHC 31.4 09/03/2020    RDW 14.5 04/26/2018     09/03/2020    MPV 10.3 09/03/2020     BMP:    Lab Results   Component Value Date     09/03/2020    K 4.1 09/03/2020     09/03/2020    CO2 26 09/03/2020    BUN 21 09/03/2020    LABALBU 4.0 09/03/2020    CREATININE 1.3 09/03/2020    CALCIUM 9.2 09/03/2020    LABGLOM 41 09/03/2020    GLUCOSE 84 09/03/2020    GLUCOSE 81 01/18/2017     Hepatic Function Panel:    Lab Results   Component Value Date    ALKPHOS 82 09/03/2020    ALT 21 09/03/2020    AST 24 09/03/2020    PROT 6.5 09/03/2020    BILITOT 0.2 09/03/2020    BILIDIR <0.2 11/29/2018    LABALBU 4.0 09/03/2020     Magnesium:    Lab Results   Component Value Date    MG 2.1 02/27/2019     Warfarin PT/INR:  No components found for: PTPATWAR, PTINRWAR  HgBA1c:    Lab Results   Component Value Date    LABA1C 5.6 01/15/2019     FLP:    Lab Results   Component Value Date    TRIG 62 01/15/2019     01/15/2019    LDLCALC 63 01/15/2019    LDLDIRECT 73 01/18/2017    LABVLDL 9 02/18/2016     TSH:    Lab Results   Component Value Date    TSH 1.280 10/22/2018     Normal sinus rhythm  Incomplete right bundle branch block  Borderline ECG  When compared with ECG of 27-MAR-2019 19:51,  Premature atrial complexes are no longer Present  Incomplete right bundle branch block is now Present  Confirmed by Merari Lopez MD, Liliana Jacob ([id number]) on 9/3/2020 10:52:42 PM      Assessment    Tenderness on the left chest wall   Complain of chronic back pain   Diagnosis Orders   1. Chest pain, atypical- tenderness on the left lower chest wall     2. Essential hypertension     3. Pure hypercholesterolemia     4. Abnormal EKG           Plan     Chest pain atypical MSK  Trop negative  CKD III  On pepcid  predison 40 po qd for 3 days  Echo  lexisc nuc  Hx of CVA  Cont asa 81    Hypertension, on medical treatment. Seems to be under good control. Patient is compliant with medical treatment. Hyperlipidemia: on statins, followed periodically. Patient need periodic lipid and liver profile.     Ed record reviewed    D/w the pat the plan of care    RTC in 3 weeks      Sweta Gonsalves

## 2020-09-04 NOTE — TELEPHONE ENCOUNTER
KIRANOM   Asking for a return call. Patient is scheduled for the Echo on 09.22.2020 at 830am , and Lexiscan to follow.     F/U apt with Nguyễn on 09.25.2020 at  12pm

## 2020-09-04 NOTE — TELEPHONE ENCOUNTER
Spoke with patient ,   Verbalized understanding with read back. Patient was given instructions at time of visit.

## 2020-09-06 ENCOUNTER — PATIENT MESSAGE (OUTPATIENT)
Dept: FAMILY MEDICINE CLINIC | Age: 65
End: 2020-09-06

## 2020-09-08 ENCOUNTER — VIRTUAL VISIT (OUTPATIENT)
Dept: PSYCHOLOGY | Age: 65
End: 2020-09-08
Payer: MEDICARE

## 2020-09-08 PROCEDURE — 90834 PSYTX W PT 45 MINUTES: CPT | Performed by: PSYCHOLOGIST

## 2020-09-08 RX ORDER — SIMVASTATIN 20 MG
TABLET ORAL
Qty: 90 TABLET | Refills: 1 | Status: SHIPPED | OUTPATIENT
Start: 2020-09-08 | End: 2021-01-21 | Stop reason: SDUPTHER

## 2020-09-08 RX ORDER — CHLORDIAZEPOXIDE HYDROCHLORIDE AND CLIDINIUM BROMIDE 5; 2.5 MG/1; MG/1
1 CAPSULE ORAL DAILY
Qty: 30 CAPSULE | Refills: 2 | Status: SHIPPED | OUTPATIENT
Start: 2020-09-08 | End: 2020-09-16 | Stop reason: SDUPTHER

## 2020-09-08 NOTE — PROGRESS NOTES
[address]  [address] [place] [address]  [address]  Dept: [phone]  Dept Fax: [phone]: [phone]    Patient: Skyler Ascencio  Visit Date: 9/8/2020  Referring Provider:   No ref. provider found    SUBJECTIVE DATA     CHIEF COMPLAINT:    Chief Complaint   Patient presents with    Anxiety    Depression    Stress       Patient update: This session was conducted as a telepsychology visit due to one or more of the following COVID-19 risk factors being present in this patient:   The patient is aged 61 or older   The patient reports chronic health problems   The patient reports immune suppressed or immune compromised status   The patient reports being at risk or potentially exposed to the virus   Office is closed or operating at reduced capacity due to coronavirus pandemic    Patient Location: Home    Provider Location (City/State): Home    This virtual visit was conducted via interactive/real-time audio/video, using phone only, at patient's request, as she was having difficulty managing the video platform. Patient reports she has a lot of anxiety r/t recent episode of chest tenderness, being worked up by Dr. Phylicia Camacho. Wonders how much it is due to stress, and we discussed that, along with mitigating strategies. Worries about friend Celia Wyatt who's very sick, and friend Willy Sheffield who is in final stages of life. Praying for them and trying to do thoughtful things for them. Very stressed about  Tenzin's ongoing stressful behavior. We spent most of the session working on acceptance and coping strategies, rather than her focusing on trying to change behaviors that have been there for 28 years. Recognizing the need to look at the positive, too.     Goals:  · Strengthen the marriage by focusing on what she can control  · Ask Monika Signs if she can take over the finances--meet with  if possible  · Continue support group involvement  · Caution about COVID precautions  · Let go of concerns about Mary Hidalgo and Weston Officer after doing the things you outlined    OBJECTIVE DATA     Physical Exam:    Mental Status Evaluation:   Orientation: Alert, oriented. Mood:. anxious, mood a bit better, irritable      Affect:        Appearance:     Activity:     Gait/Posture:    Speech:  Normal, talkative   Thought Process:  within normal limits   Thought Content: Within Normal Limits   Cognition:  Normal   Memory: Normal   Insight:  good   Judgment: Normal   Suicidal Ideations: Denies Suicidal Ideations   Homicidal Ideations: Denies Homicidal Ideations   Medication Side Effects: absent       Attention Span Normal     Screenings Completed in This Encounter:                                      DIAGNOSIS AND ASSESSMENT DATA     DIAGNOSIS:  See visit diagnoses. 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 Doing OK with maintenance sessions. · Anxiety continues to be an issue, with reassurance-seeking her most common coping strategy. I want her to use cognitive reframing more freely. · Focus on meaning & purpose, being there for others, which she is quite good at    ASSESSMENT/PLAN   Follow-up:  No follow-ups on file. Resume good work at [employer], when possible. Resume use of  IBS hypnosis recording (on phone)  Ask Carl Treadwell to intervene when it comes to your relationship with Vanessa Ramirez and Scott in the loop when it comes to finances, and let them support you. Has a very strong emotional/social support system, which is one of her great strengths.    Needs to tap into that to work on the distress with her   Has re-started bright light therapy, using it regularly  Put some limits on your   Let go of the anxiety about [employer] Officer  Focus on [employer] assignment before next session:     [] strategies. Continue with better assertion re: health needs. Continue with self-care and \"Serenity Prayer\" for letting go of things you cannot control or fix. Keep using the deep breathing scripts you found on Facebook that are helping  Continue with cardiac workup    See items under \"Plan,\" above. Session lasted 44 minutes.     Provider Signature:  Electronically signed by Latha Ignacio, PhD on 9/8/2020 at 11:06 AM

## 2020-09-08 NOTE — TELEPHONE ENCOUNTER
From: Torito Paul  To: Elen Huddleston MD  Sent: 9/6/2020 9:39 PM EDT  Subject: Prescription Question    I need a refill for chlordiazepoxide/Clininwinlum-2.5mg c. 1 capsule by mouth twice a day. Also Simvaation 20 Mg 1 tablet daily. I went to the hospital Thursday for chest pain down my left arm. They said it wasn't a heart attack. Sent me to heart doctor the next day. He held WILEY Adrian. He said I had swelling around the outside of my heart. He gave me a for 6days and said take Tylenol with it. It now is also giving me terrible spasms in my back! I have to have an Echocardiogram and nuclear stress test cardiolite/lexiscan on the 22nd. If this month. I am getting that drainage again like I had before. I had so much the last two nights it felt like it was going to suffocate me in bed! I am going to try sitting up tonight.

## 2020-09-11 RX ORDER — CHLORDIAZEPOXIDE HYDROCHLORIDE AND CLIDINIUM BROMIDE 5; 2.5 MG/1; MG/1
1 CAPSULE ORAL 2 TIMES DAILY PRN
Qty: 180 CAPSULE | Refills: 0 | OUTPATIENT
Start: 2020-09-11 | End: 2020-12-10

## 2020-09-11 RX ORDER — BUPROPION HYDROCHLORIDE 150 MG/1
TABLET, EXTENDED RELEASE ORAL
Qty: 180 TABLET | Refills: 1 | Status: SHIPPED | OUTPATIENT
Start: 2020-09-11 | End: 2021-01-07 | Stop reason: DRUGHIGH

## 2020-09-11 NOTE — TELEPHONE ENCOUNTER
Spoke with patient about multiple MyChart messages. Patient's main concern is her \"heart infection\" that Cardiologist was treating. Advised patient to contact cardiology. Deloris's second concern was that she wanted a 90 day supply on her Librax since she takes BID, and needs a refill on her Buproprion. Both set to send to [facility].

## 2020-09-14 ENCOUNTER — OFFICE VISIT (OUTPATIENT)
Dept: PSYCHOLOGY | Age: 65
End: 2020-09-14
Payer: MEDICARE

## 2020-09-14 ENCOUNTER — HOSPITAL ENCOUNTER (OUTPATIENT)
Dept: NON INVASIVE DIAGNOSTICS | Age: 65
Discharge: HOME | End: 2020-09-14
Payer: MEDICARE

## 2020-09-14 LAB
LV EF: 65 %
LVEF MODALITY: NORMAL

## 2020-09-14 PROCEDURE — 90834 PSYTX W PT 45 MINUTES: CPT | Performed by: PSYCHOLOGIST

## 2020-09-14 PROCEDURE — 93306 TTE W/DOPPLER COMPLETE: CPT

## 2020-09-14 NOTE — PROGRESS NOTES
[address]  [address] [place] [address]  [address]  Dept: [phone]  Dept Fax: [phone]: [phone]    Patient: Nathanael Mensah  Visit Date: 9/14/2020  Referring Provider:   No ref. provider found    SUBJECTIVE DATA     CHIEF COMPLAINT:    Chief Complaint   Patient presents with    Anxiety    Depression    Other       Patient update: This session was conducted as a face-to-face meeting, per patient's request, with appropriate social distancing and both patient and psychologist wearing masks. Patient reports     · She is feeling desperate about the situation at home. Doesn't feel she can afford to get . We talked about options (living as roommates, temporarily moving out with a friend so she can get some perspective, or moving toward a divorce  · She played for me a recording of a huge argument they had, in which everyone was raising their voices. She usually eventually goes to her room and tries to distract herself. Poor capacity to regulate emotions when she gets upset and irrational  · Worried about echocardiogram and stress test for later today  · Doing a little bit of socializing, within COVID limitations  · Considering going to a [facility]    OBJECTIVE DATA     Physical Exam:    Mental Status Evaluation:   Orientation: Alert, oriented. Mood:. anxious, highly irritable      Affect:  anxious      Appearance:  Normal   Activity:  Normal   Gait/Posture: Halting, as per usual   Speech:  Normal, talkative   Thought Process: Tangential   Thought Content:   Within Normal Limits   Cognition:  Normal   Memory: Normal   Insight:  good   Judgment: Normal   Suicidal Ideations: Denies Suicidal Ideations   Homicidal Ideations: Denies Homicidal Ideations   Medication Side Effects: absent       Attention Span Normal     Screenings Completed in This Encounter:                                      DIAGNOSIS AND ASSESSMENT DATA     DIAGNOSIS: See visit diagnoses. 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 Doing OK with maintenance sessions. · Anxiety continues to be an issue, with reassurance-seeking her most common coping strategy. I want her to use cognitive reframing more freely. · Focus on meaning & purpose, being there for others, which she is quite good at  · Difficulty ending sessions    ASSESSMENT/PLAN   Follow-up:  No follow-ups on file. After session, I called Dr. Vickie Soto and discussed possible evaluation of patient by her to address irritability/agitation. For patient:    Resume good work at [employer], when possible. Resume use of  IBS hypnosis recording (on phone)  Has a very strong emotional/social support system, which is one of her great strengths. Needs to tap into that to work on the distress with her   Has re-started bright light therapy, using it regularly  Put some limits on your   Let go of the anxiety about Hardik Gilmore on [employer] assignment before next session:     [] Practice deep breathing 1-2 times per day for 15 minutes, as demonstrated in session, and/or:    [] Go to [employer]" web site and begin practicing with their free meditation podcasts    [x] Track thoughts that you think feed anxiety or depression    [] Go to [employer]\" web site, open up the \"Workbooks\" tab, and select a problem from the list that best suits your needs. Review the first module. [x] Begin to track physical activity. Even five minutes per day will be helpful.    [] Talk with your physician about whether it's OK to start fish oil (burpless) or flax oil, 1000 to 1200 mg per day    [] Most importantly, remember this guideline: \"Don't believe everything you think! \"   :)    [] Try \"Expressive Writing\" using the guidelines

## 2020-09-14 NOTE — PATIENT INSTRUCTIONS
[address] [facility]  [employer] [facility]  [facility]   Closes 5PM   [phone]

## 2020-09-15 ENCOUNTER — HOSPITAL ENCOUNTER (OUTPATIENT)
Dept: NON INVASIVE DIAGNOSTICS | Age: 65
Discharge: HOME | End: 2020-09-15
Payer: MEDICARE

## 2020-09-15 VITALS — BODY MASS INDEX: 25.13 KG/M2 | WEIGHT: 128 LBS | HEIGHT: 60 IN

## 2020-09-15 PROCEDURE — A9500 TC99M SESTAMIBI: HCPCS | Performed by: INTERNAL MEDICINE

## 2020-09-15 PROCEDURE — 3430000000 HC RX DIAGNOSTIC RADIOPHARMACEUTICAL: Performed by: INTERNAL MEDICINE

## 2020-09-15 PROCEDURE — 93017 CV STRESS TEST TRACING ONLY: CPT | Performed by: INTERNAL MEDICINE

## 2020-09-15 PROCEDURE — 78452 HT MUSCLE IMAGE SPECT MULT: CPT

## 2020-09-15 PROCEDURE — 6360000002 HC RX W HCPCS

## 2020-09-15 RX ADMIN — Medication 11 MILLICURIE: at 07:15

## 2020-09-15 RX ADMIN — Medication 35 MILLICURIE: at 08:05

## 2020-09-15 ASSESSMENT — ENCOUNTER SYMPTOMS
SHORTNESS OF BREATH: 0
COUGH: 0

## 2020-09-15 NOTE — PROGRESS NOTES
[address] [place] [address] [address], [address] [place]  Phone:  [phone]  VZI:[phone]       Name: Gerhardt Falconer  : 1955    Chief Complaint   Patient presents with    Anxiety     increase in confusion per    gets very agitated at times          HPI:     Gerhardt Falconer is a 75 y.o. female who presents today with her  for follow-up of anxiety. She has been seeing Elodia Hamilton who contacted our office this week stating that Abhilash Espinoza has been very agitated and likely delusional.  Tenzin's daughter Osvaldo Noble was staying with them and Abhilash Espinoza let her stay in her bedroom. She complained it was too light in there so she even bought new blinds. She kept harassing Abhilash Espinoza then went to their [address] and put fresh ramos by Tenzin's grave and a patricia metal flower by her grave site. She denies Mili Knight doing this and states he was with her daughter all day. Abhilash Espinoza is upset because she thinks Mili Knight is covering for her. Mili Knight says she's seeing things that aren't there. Abhilash Espinoza has a friend who recently lost her  and another that is terminally ill. Current Outpatient Medications:     chlordiazePOXIDE-clidinium (LIBRAX) 5-2.5 MG per capsule, Take 1 capsule by mouth 2 times daily.  TAKE 1 CAPSULE TWICE A DAY AS NEEDED FOR PAIN, Disp: 180 capsule, Rfl: 0    divalproex (DEPAKOTE ER) 250 MG extended release tablet, Take 1 tablet by mouth daily, Disp: 30 tablet, Rfl: 2    buPROPion (WELLBUTRIN SR) 150 MG extended release tablet, TAKE 1 TABLET TWICE A DAY, Disp: 180 tablet, Rfl: 1    simvastatin (ZOCOR) 20 MG tablet, TAKE 1 TABLET DAILY, Disp: 90 tablet, Rfl: 1    cetirizine (ZYRTEC ALLERGY) 10 MG tablet, Take 1 tablet by mouth daily, Disp: 30 tablet, Rfl: 11    clopidogrel (PLAVIX) 75 MG tablet, TAKE 1 TABLET DAILY, Disp: 90 tablet, Rfl: 1    vitamin D (ERGOCALCIFEROL) 1.25 MG (51779 UT) CAPS capsule, Take 1 capsule by mouth once a week, Disp: 12 capsule, Rfl: 1    amLODIPine anxiety.     Electronically signed by Rhonda Giron MD on 9/16/2020 at 9:01 AM

## 2020-09-16 ENCOUNTER — OFFICE VISIT (OUTPATIENT)
Dept: FAMILY MEDICINE CLINIC | Age: 65
End: 2020-09-16
Payer: MEDICARE

## 2020-09-16 VITALS
HEIGHT: 60 IN | DIASTOLIC BLOOD PRESSURE: 64 MMHG | HEART RATE: 60 BPM | BODY MASS INDEX: 25.91 KG/M2 | TEMPERATURE: 97.4 F | WEIGHT: 132 LBS | OXYGEN SATURATION: 99 % | SYSTOLIC BLOOD PRESSURE: 134 MMHG

## 2020-09-16 PROCEDURE — G8427 DOCREV CUR MEDS BY ELIG CLIN: HCPCS | Performed by: FAMILY MEDICINE

## 2020-09-16 PROCEDURE — 3017F COLORECTAL CA SCREEN DOC REV: CPT | Performed by: FAMILY MEDICINE

## 2020-09-16 PROCEDURE — 99213 OFFICE O/P EST LOW 20 MIN: CPT | Performed by: FAMILY MEDICINE

## 2020-09-16 PROCEDURE — G8399 PT W/DXA RESULTS DOCUMENT: HCPCS | Performed by: FAMILY MEDICINE

## 2020-09-16 PROCEDURE — 1036F TOBACCO NON-USER: CPT | Performed by: FAMILY MEDICINE

## 2020-09-16 PROCEDURE — 4040F PNEUMOC VAC/ADMIN/RCVD: CPT | Performed by: FAMILY MEDICINE

## 2020-09-16 PROCEDURE — 1090F PRES/ABSN URINE INCON ASSESS: CPT | Performed by: FAMILY MEDICINE

## 2020-09-16 PROCEDURE — 1123F ACP DISCUSS/DSCN MKR DOCD: CPT | Performed by: FAMILY MEDICINE

## 2020-09-16 PROCEDURE — G8417 CALC BMI ABV UP PARAM F/U: HCPCS | Performed by: FAMILY MEDICINE

## 2020-09-16 RX ORDER — CHLORDIAZEPOXIDE HYDROCHLORIDE AND CLIDINIUM BROMIDE 5; 2.5 MG/1; MG/1
1 CAPSULE ORAL 2 TIMES DAILY
Qty: 180 CAPSULE | Refills: 0 | Status: SHIPPED | OUTPATIENT
Start: 2020-09-16 | End: 2020-12-08 | Stop reason: SDUPTHER

## 2020-09-16 RX ORDER — DIVALPROEX SODIUM 250 MG/1
250 TABLET, EXTENDED RELEASE ORAL DAILY
Qty: 30 TABLET | Refills: 2 | Status: SHIPPED | OUTPATIENT
Start: 2020-09-16 | End: 2020-11-11 | Stop reason: SDUPTHER

## 2020-09-18 ENCOUNTER — APPOINTMENT (OUTPATIENT)
Dept: GENERAL RADIOLOGY | Age: 65
End: 2020-09-18
Payer: MEDICARE

## 2020-09-18 ENCOUNTER — HOSPITAL ENCOUNTER (EMERGENCY)
Age: 65
Discharge: HOME | End: 2020-09-18
Payer: MEDICARE

## 2020-09-18 VITALS
TEMPERATURE: 97.2 F | WEIGHT: 132 LBS | DIASTOLIC BLOOD PRESSURE: 60 MMHG | HEART RATE: 88 BPM | HEIGHT: 60 IN | RESPIRATION RATE: 18 BRPM | SYSTOLIC BLOOD PRESSURE: 127 MMHG | BODY MASS INDEX: 25.91 KG/M2 | OXYGEN SATURATION: 99 %

## 2020-09-18 PROCEDURE — 74018 RADEX ABDOMEN 1 VIEW: CPT

## 2020-09-18 PROCEDURE — 99214 OFFICE O/P EST MOD 30 MIN: CPT

## 2020-09-18 PROCEDURE — 99213 OFFICE O/P EST LOW 20 MIN: CPT | Performed by: NURSE PRACTITIONER

## 2020-09-18 ASSESSMENT — PAIN DESCRIPTION - PAIN TYPE: TYPE: ACUTE PAIN

## 2020-09-18 ASSESSMENT — PAIN DESCRIPTION - FREQUENCY: FREQUENCY: INTERMITTENT

## 2020-09-18 ASSESSMENT — ENCOUNTER SYMPTOMS
CONSTIPATION: 1
ABDOMINAL PAIN: 1
BLOOD IN STOOL: 0
ABDOMINAL DISTENTION: 1
COUGH: 0
NAUSEA: 1
SHORTNESS OF BREATH: 0
VOMITING: 0
DIARRHEA: 0

## 2020-09-18 ASSESSMENT — PAIN DESCRIPTION - PROGRESSION: CLINICAL_PROGRESSION: GRADUALLY WORSENING

## 2020-09-18 ASSESSMENT — PAIN - FUNCTIONAL ASSESSMENT: PAIN_FUNCTIONAL_ASSESSMENT: PREVENTS OR INTERFERES WITH MANY ACTIVE NOT PASSIVE ACTIVITIES

## 2020-09-18 ASSESSMENT — PAIN SCALES - GENERAL: PAINLEVEL_OUTOF10: 0

## 2020-09-18 ASSESSMENT — PAIN DESCRIPTION - ONSET: ONSET: GRADUAL

## 2020-09-18 ASSESSMENT — PAIN DESCRIPTION - ORIENTATION: ORIENTATION: LEFT;LOWER

## 2020-09-18 ASSESSMENT — PAIN DESCRIPTION - DESCRIPTORS: DESCRIPTORS: TENDER

## 2020-09-18 ASSESSMENT — PAIN DESCRIPTION - LOCATION: LOCATION: ABDOMEN

## 2020-09-18 NOTE — ED NOTES
No change in patients condition. Discharge instructions discussed with pt and pt verbalized understanding of info given. Pt left stable and ambulated to exit.        Renay Saldivar RN  09/18/20 [id number]

## 2020-09-18 NOTE — ED PROVIDER NOTES
[address]  Urgent Care Encounter       CHIEF COMPLAINT       Chief Complaint   Patient presents with    Abdominal Pain     llq abd pain since sunday or monday, no BM since then either. Nurses Notes reviewed and I agree except as noted in the HPI. HISTORY OF PRESENT ILLNESS   Jaime Hood is a 75 y.o. female who presents with complaints of left lower quadrant abdominal pain that is been present for the past 4 days. Patient does report a history of constipation and has not had a normal bowel movement since Sunday or Monday. She did take Linzess on Tuesday and then 2 tablets on Wednesday as well as increased her fiber with no results. She took a fleets enema today with no results. She is reporting having liquid yellow \"pus\" leaking from her rectum. She has used 14 small depends pads today due to leakage. She has not passed much gas in the last 2 days. Today she has had very little appetite. No reports of fever, chills or vomiting. She does report mild nausea. No history of inflammatory bowel disease or diverticulosis. She did have a colonoscopy within the last 2years. The history is provided by the patient. REVIEW OF SYSTEMS     Review of Systems   Constitutional: Positive for appetite change and fatigue. Negative for chills and fever. Respiratory: Negative for cough and shortness of breath. Cardiovascular: Negative for chest pain. Gastrointestinal: Positive for abdominal distention, abdominal pain, constipation and nausea. Negative for blood in stool, diarrhea and vomiting. Genitourinary: Negative for decreased urine volume. Musculoskeletal: Negative for myalgias. Neurological: Negative for dizziness and headaches.        PAST MEDICAL HISTORY         Diagnosis Date    Allergic rhinitis     Anxiety     CKD stage 4 due to type 2 diabetes mellitus (HCC)     CVA (cerebral infarction)     Depression     Fibromyalgia     Headache(784.0)     Hyperlipidemia     Hypertension     Irritable bowel syndrome     Kidney failure     Unspecified cerebral artery occlusion with cerebral infarction     Unspecified sleep apnea        SURGICALHISTORY     Patient  has a past surgical history that includes sinus surgery (10 years ago); Hemorrhoid surgery (unsure); Hysterectomy; Neck surgery (18  years ago); eye surgery; Endoscopy, colon, diagnostic (unsure); Colonoscopy (2012); Tubal ligation; and Carpal tunnel release (Right). CURRENT MEDICATIONS       Current Discharge Medication List      CONTINUE these medications which have NOT CHANGED    Details   chlordiazePOXIDE-clidinium (LIBRAX) 5-2.5 MG per capsule Take 1 capsule by mouth 2 times daily.  TAKE 1 CAPSULE TWICE A DAY AS NEEDED FOR PAIN  Qty: 180 capsule, Refills: 0    Associated Diagnoses: Irritable bowel syndrome without diarrhea      buPROPion (WELLBUTRIN SR) 150 MG extended release tablet TAKE 1 TABLET TWICE A DAY  Qty: 180 tablet, Refills: 1    Associated Diagnoses: Anxiety disorder, unspecified type      simvastatin (ZOCOR) 20 MG tablet TAKE 1 TABLET DAILY  Qty: 90 tablet, Refills: 1    Associated Diagnoses: Pure hypercholesterolemia      cetirizine (ZYRTEC ALLERGY) 10 MG tablet Take 1 tablet by mouth daily  Qty: 30 tablet, Refills: 11    Associated Diagnoses: Non-seasonal allergic rhinitis, unspecified trigger      clopidogrel (PLAVIX) 75 MG tablet TAKE 1 TABLET DAILY  Qty: 90 tablet, Refills: 1    Associated Diagnoses: Cerebral infarction due to thrombosis of precerebral artery (HCC)      vitamin D (ERGOCALCIFEROL) 1.25 MG (14131 UT) CAPS capsule Take 1 capsule by mouth once a week  Qty: 12 capsule, Refills: 1    Associated Diagnoses: Vitamin D deficiency      amLODIPine (NORVASC) 10 MG tablet Take 1 tablet by mouth daily  Qty: 90 tablet, Refills: 3      calcium-cholecalciferol (OYSCO 500 + D) 500-200 MG-UNIT per tablet Take 2 tablets by mouth 2 times daily  Qty: 360 tablet, Refills: 3 traZODone (DESYREL) 50 MG tablet TAKE 1 TABLET NIGHTLY  Qty: 90 tablet, Refills: 0    Comments: PLEASE CONTACT PATIENT WHEN RX IS READY TO BE PICKED UP. Associated Diagnoses: Psychophysiological insomnia      Probiotic Product (PROBIOTIC-10) CHEW Take by mouth daily      famotidine (PEPCID) 20 MG tablet Take 1 tablet by mouth 2 times daily  Qty: 60 tablet, Refills: 3      linaclotide (LINZESS) 145 MCG capsule Take 145 mcg by mouth every morning (before breakfast)      MAGNESIUM CITRATE PO Take by mouth as needed (weekly)      denosumab (PROLIA) 60 MG/ML SOLN SC injection Inject 60 mg into the skin once      folic acid (FOLVITE) 283 MCG tablet Take 400 mcg by mouth daily      b complex vitamins capsule Take 1 capsule by mouth daily  Qty: 100 capsule, Refills: 3      FISH OIL 1,000 mg by Does not apply route 2 times daily Indications: Decreased Energy       aspirin 81 MG tablet Take 81 mg by mouth daily. divalproex (DEPAKOTE ER) 250 MG extended release tablet Take 1 tablet by mouth daily  Qty: 30 tablet, Refills: 2    Associated Diagnoses: Anxiety      Tetrahydrozoline-Zn Sulfate (EYE DROPS ALLERGY RELIEF OP) Apply 1 drop to eye daily      brompheniramine-pseudoephedrine-DM 2-30-10 MG/5ML syrup Take 5 mLs by mouth 4 times daily as needed for Congestion or Cough  Qty: 140 mL, Refills: 0      Triamcinolone Acetonide (NASACORT AQ NA) by Nasal route             ALLERGIES     Patient is is allergic to actos [pioglitazone]; augmentin [amoxicillin-pot clavulanate]; ciprofloxacin; other; and sulfa antibiotics.     Patients   Immunization History   Administered Date(s) Administered    Influenza, Quadv, 6 mo and older, IM (Fluzone, Flulaval) 10/02/2018    Influenza, Quadv, IM, (6 mo and older Fluzone, Flulaval, Fluarix and 3 yrs and older Afluria) 12/28/2016, 11/17/2017    Influenza, Triv, inactivated, subunit, adjuvanted, IM (Fluad 65 yrs and older) 11/20/2019    Pneumococcal Conjugate 13-valent ([facility]) Behavior: Behavior normal. Behavior is cooperative. DIAGNOSTIC RESULTS     Labs:No results found for this visit on 09/18/20. IMAGING:    XR ABDOMEN (KUB) (SINGLE AP VIEW)   Final Result   Moderate retained stool. Nonobstructive bowel gas pattern. **This report has been created using voice recognition software. It may contain minor errors which are inherent in voice recognition technology. **      Final report electronically signed by Dr. Lori Prieto MD on 9/18/2020 5:52 PM            URGENT CARE COURSE:     Vitals:    09/18/20 1703   BP: 127/60   Pulse: 88   Resp: 18   Temp: 97.2 °F (36.2 °C)   TempSrc: Temporal   SpO2: 99%   Weight: 132 lb (59.9 kg)   Height: 5' (1.524 m)       Medications - No data to display         PROCEDURES:  None    FINAL IMPRESSION      1. Constipation, unspecified constipation type    2. Abdominal pain, left lower quadrant          DISPOSITION/ PLAN     Patient presents with left lower quadrant abdominal pain. KUB completed which shows a moderate amount of retained stool. Patient has a constipation. She is to continue her Linzess as ordered. She does have lactulose at home that she can use. Patient can also attempt fleets enema or try milk of magnesia. Instructed to notify her GI specialist on Monday for current issues and treatment. She is to go to ER for worsening condition or any other concerning symptoms. Further instructions were outlined verbally and in the patient's discharge instructions. All the patient's questions were answered. The patient/parent agreed with the plan and was discharged from the [facility] in good condition.       PATIENT REFERRED TO:  Lisa Carrillo MD  [facility] / [place] [id number]      DISCHARGE MEDICATIONS:  Current Discharge Medication List          Current Discharge Medication List          Current Discharge Medication List          Rena Gardiner, APRN - CNP    (Please note that portions of this note were completed with a voice recognition program. Efforts were made to edit the dictations but occasionally words are mis-transcribed.)         Lionel Gardiner, APRN - CNP  09/18/20 0641

## 2020-09-25 ENCOUNTER — OFFICE VISIT (OUTPATIENT)
Dept: CARDIOLOGY CLINIC | Age: 65
End: 2020-09-25
Payer: MEDICARE

## 2020-09-25 VITALS
HEIGHT: 60 IN | HEART RATE: 82 BPM | DIASTOLIC BLOOD PRESSURE: 70 MMHG | WEIGHT: 131.4 LBS | BODY MASS INDEX: 25.8 KG/M2 | SYSTOLIC BLOOD PRESSURE: 128 MMHG

## 2020-09-25 PROBLEM — R42 POSTURAL DIZZINESS: Status: ACTIVE | Noted: 2020-09-25

## 2020-09-25 PROCEDURE — 1123F ACP DISCUSS/DSCN MKR DOCD: CPT | Performed by: INTERNAL MEDICINE

## 2020-09-25 PROCEDURE — 1036F TOBACCO NON-USER: CPT | Performed by: INTERNAL MEDICINE

## 2020-09-25 PROCEDURE — G8427 DOCREV CUR MEDS BY ELIG CLIN: HCPCS | Performed by: INTERNAL MEDICINE

## 2020-09-25 PROCEDURE — G8417 CALC BMI ABV UP PARAM F/U: HCPCS | Performed by: INTERNAL MEDICINE

## 2020-09-25 PROCEDURE — 4040F PNEUMOC VAC/ADMIN/RCVD: CPT | Performed by: INTERNAL MEDICINE

## 2020-09-25 PROCEDURE — 3017F COLORECTAL CA SCREEN DOC REV: CPT | Performed by: INTERNAL MEDICINE

## 2020-09-25 PROCEDURE — G8399 PT W/DXA RESULTS DOCUMENT: HCPCS | Performed by: INTERNAL MEDICINE

## 2020-09-25 PROCEDURE — 99214 OFFICE O/P EST MOD 30 MIN: CPT | Performed by: INTERNAL MEDICINE

## 2020-09-25 PROCEDURE — 1090F PRES/ABSN URINE INCON ASSESS: CPT | Performed by: INTERNAL MEDICINE

## 2020-09-25 RX ORDER — PANTOPRAZOLE SODIUM 40 MG/1
40 TABLET, DELAYED RELEASE ORAL DAILY
COMMUNITY
Start: 2020-09-09 | End: 2021-01-21 | Stop reason: SDUPTHER

## 2020-09-25 NOTE — PROGRESS NOTES
Chief Complaint   Patient presents with    Follow-up     3 WEEK       Send from ED for chest pain      3 weeks F/u    Chest pain much better    Improved after 3 days prednisone       Denied sob, palpitations or edema    Orthostatics done on her today in office-negative    Still get dizziness on standing    Hx of back pain and Osteoarthritis    Hx of CVA 8 yrs back with residual weakness on left leg    CKD IV    Nonsmoker    FHX  Father had MI at 45 yrs  Mother had MVP    Patient Active Problem List   Diagnosis    Cerebral infarction [facility])    Chest pain, atypical- tenderness on the left lower chest wall    Hyperlipidemia    Irritable bowel syndrome    Abdominal adhesions    Allergic rhinitis    Essential hypertension    Generalized anxiety disorder    CKD (chronic kidney disease), stage IV ([facility])    CKD (chronic kidney disease) stage 3, GFR 30-59 ml/min ([facility])    Age-related osteoporosis without current pathological fracture    Major depression, recurrent ([address]    Environmental and seasonal allergies    Hearing loss of right ear due to cerumen impaction    Recurrent sinusitis    Abnormal EKG    Postural dizziness       Past Surgical History:   Procedure Laterality Date    CARPAL TUNNEL RELEASE Right     [facility]    ENDOSCOPY, COLON, DIAGNOSTIC  unsure    EYE SURGERY      lasik    HEMORRHOID SURGERY  unsure    HYSTERECTOMY      total    NECK SURGERY  18  years ago    fusion    SINUS SURGERY  10 years ago    TUBAL LIGATION         Allergies   Allergen Reactions    Actos [Pioglitazone] Nausea And Vomiting    Augmentin [Amoxicillin-Pot Clavulanate] Diarrhea    Ciprofloxacin      unsure    Other Itching     Dog, cat, pet dander    Sulfa Antibiotics Rash        Family History   Problem Relation Age of Onset    Diabetes Mother     High Blood Pressure Mother     Heart Disease Mother     Heart Disease Father     Parkinsonism Father     Neurofibromatosis Father    [facility] Depression Father     Neurofibromatosis Sister     Neurofibromatosis Brother     Other Brother         multiple sclerosis    Dementia Brother     Diabetes Sister     High Blood Pressure Sister     Depression Sister         Social History     Socioeconomic History    Marital status:      Spouse name: Not on file    Number of children: Not on file    Years of education: Not on file    Highest education level: Not on file   Occupational History    Occupation: unemployed at present time   Social Needs    Financial resource strain: Not on file    Food insecurity     Worry: Not on file     Inability: Not on file   [employer] needs     Medical: Not on file     Non-medical: Not on file   Tobacco Use    Smoking status: Never Smoker    Smokeless tobacco: Never Used   Substance and Sexual Activity    Alcohol use: No     Alcohol/week: 0.0 standard drinks    Drug use: No    Sexual activity: Not Currently   Lifestyle    Physical activity     Days per week: Not on file     Minutes per session: Not on file    Stress: Not on file   Relationships    Social connections     Talks on phone: Not on file     Gets together: Not on file     Attends Jehovah's witness service: Not on file     Active member of club or organization: Not on file     Attends meetings of clubs or organizations: Not on file     Relationship status: Not on file    Intimate partner violence     Fear of current or ex partner: Not on file     Emotionally abused: Not on file     Physically abused: Not on file     Forced sexual activity: Not on file   Other Topics Concern    Not on file   Social History Narrative    Not on file       Current Outpatient Medications   Medication Sig Dispense Refill    pantoprazole (PROTONIX) 40 MG tablet Take 40 mg by mouth daily      Polyethylene Glycol 3350 (MIRALAX PO) Take by mouth      chlordiazePOXIDE-clidinium (LIBRAX) 5-2.5 MG per capsule Take 1 capsule by mouth 2 times daily.  TAKE 1 CAPSULE TWICE A DAY AS NEEDED FOR PAIN 180 capsule 0    divalproex (DEPAKOTE ER) 250 MG extended release tablet Take 1 tablet by mouth daily 30 tablet 2    buPROPion (WELLBUTRIN SR) 150 MG extended release tablet TAKE 1 TABLET TWICE A  tablet 1    simvastatin (ZOCOR) 20 MG tablet TAKE 1 TABLET DAILY 90 tablet 1    cetirizine (ZYRTEC ALLERGY) 10 MG tablet Take 1 tablet by mouth daily 30 tablet 11    clopidogrel (PLAVIX) 75 MG tablet TAKE 1 TABLET DAILY 90 tablet 1    vitamin D (ERGOCALCIFEROL) 1.25 MG (29598 UT) CAPS capsule Take 1 capsule by mouth once a week 12 capsule 1    amLODIPine (NORVASC) 10 MG tablet Take 1 tablet by mouth daily 90 tablet 3    calcium-cholecalciferol (OYSCO 500 + D) 500-200 MG-UNIT per tablet Take 2 tablets by mouth 2 times daily 360 tablet 3    Tetrahydrozoline-Zn Sulfate (EYE DROPS ALLERGY RELIEF OP) Apply 1 drop to eye daily      traZODone (DESYREL) 50 MG tablet TAKE 1 TABLET NIGHTLY 90 tablet 0    Probiotic Product (PROBIOTIC-10) CHEW Take by mouth daily      linaclotide (LINZESS) 145 MCG capsule Take 145 mcg by mouth every morning (before breakfast)      MAGNESIUM CITRATE PO Take by mouth as needed (weekly)      denosumab (PROLIA) 60 MG/ML SOLN SC injection Inject 60 mg into the skin once      folic acid (FOLVITE) 217 MCG tablet Take 400 mcg by mouth daily      b complex vitamins capsule Take 1 capsule by mouth daily 100 capsule 3    FISH OIL 1,000 mg by Does not apply route 2 times daily Indications: Decreased Energy       aspirin 81 MG tablet Take 81 mg by mouth daily. No current facility-administered medications for this visit. Review of Systems -     General ROS: negative  Psychological ROS: negative  Hematological and Lymphatic ROS: No history of blood clots or bleeding disorder.    Respiratory ROS: no cough,  or wheezing, the rest see HPI  Cardiovascular ROS: See HPI  Gastrointestinal ROS: negative  Genito-Urinary ROS: no dysuria, trouble voiding, or 09/03/2020    PROT 6.5 09/03/2020    BILITOT 0.2 09/03/2020    BILIDIR <0.2 11/29/2018    LABALBU 4.0 09/03/2020     Magnesium:    Lab Results   Component Value Date    MG 2.1 02/27/2019     Warfarin PT/INR:  No components found for: PTPATWAR, PTINRWAR  HgBA1c:    Lab Results   Component Value Date    LABA1C 5.6 01/15/2019     FLP:    Lab Results   Component Value Date    TRIG 62 01/15/2019     01/15/2019    LDLCALC 63 01/15/2019    LDLDIRECT 73 01/18/2017    LABVLDL 9 02/18/2016     TSH:    Lab Results   Component Value Date    TSH 1.280 10/22/2018     Normal sinus rhythm  Incomplete right bundle branch block  Borderline ECG  When compared with ECG of 27-MAR-2019 19:51,  Premature atrial complexes are no longer Present  Incomplete right bundle branch block is now Present  Confirmed by Kishor Huizar MD, Ginna Santiago ([id number]) on 9/3/2020 10:52:42 PM       Conclusions    Summary   Normal left ventricle size and systolic function. Ejection fraction was   estimated at 65 %. There were no regional left ventricular wall motion   abnormalities and wall thickness was within normal limits. Doppler parameters were consistent with abnormal left ventricular   relaxation (grade 1 diastolic dysfunction). The left atrium is Mildly dilated. Signature   ----------------------------------------------------------------   Electronically signed by Nano Valencia MD (Interpreting   physician) on 09/14/2020 at 06:49 PM   ----------------------------------------------------------------       Conclusions    Summary   Lexiscan EKG stress test is not suggestive for ischemia. The nuclear images is not suggestive for myocardial ischemia. Recommendation   Clinical correlation is recommended due to poor image quality.       Signatures    ----------------------------------------------------------------   Electronically signed by Nano Valencia MD (Interpreting   Cardiologist) on 09/15/2020 at 19:01    Assessment    Tenderness on the left chest wall   Complain of chronic back pain   Diagnosis Orders   1. Essential hypertension     2. Pure hypercholesterolemia     3. Postural dizziness     4. Abnormal EKG           Plan   meds and labs reviewed    Hx of Chest pain atypical MSK- with tenderness-better after predison  Trop negative  Echo and lexisc nuc- WNL  Hx of CVA  Cont asa 81    Hypertension, on medical treatment. Seems to be under good control. Patient is compliant with medical treatment. Hyperlipidemia: on statins, followed periodically. Patient need periodic lipid and liver profile.     Ed record reviewed    Dizziness on standing   No significant orthostatic drop by bedside  Hydration    D/w the pat the plan of care    Doing better    RTC in  4 months    Silviano Ricks Replaced by [facility]

## 2020-10-02 RX ORDER — CALCIUM CARBONATE/VITAMIN D3 500MG-5MCG
2 TABLET ORAL 2 TIMES DAILY
Qty: 360 TABLET | Refills: 1 | Status: SHIPPED | OUTPATIENT
Start: 2020-10-02 | End: 2021-01-21 | Stop reason: SDUPTHER

## 2020-10-12 ENCOUNTER — TELEPHONE (OUTPATIENT)
Dept: ENT CLINIC | Age: 65
End: 2020-10-12

## 2020-10-12 ENCOUNTER — OFFICE VISIT (OUTPATIENT)
Dept: PSYCHOLOGY | Age: 65
End: 2020-10-12
Payer: MEDICARE

## 2020-10-12 PROCEDURE — 90834 PSYTX W PT 45 MINUTES: CPT | Performed by: PSYCHOLOGIST

## 2020-10-12 NOTE — TELEPHONE ENCOUNTER
Paulette Galan called to cancel her appointment 10/14/20, lab review. Patient stated she is having colon problems and has an appointment with a GI doctor. Patient will call [address] to reschedule her appointment.

## 2020-10-12 NOTE — PROGRESS NOTES
[address] [address]  [address]  Jovana Sandoval  Dept: [phone]  Dept Fax: [phone]: [phone]    Patient: Holly Goodman  Visit Date: 10/12/2020  Referring Provider:   No ref. provider found    SUBJECTIVE DATA     CHIEF COMPLAINT:    Chief Complaint   Patient presents with    Anxiety    Depression    Stress       Patient update: This session was conducted as a face-to-face meeting, per patient's request, with appropriate social distancing and both patient and psychologist wearing masks. Patient reports     · Patient reports she is unclear about why she is going to see a psychiatrist, and we talked about her history of depression and heightened irritability since her past strokes. · Ongoing frustrations about 's dishonesty about money, which has been a major issue since I have known her, but which is intensifying in recent months. · Had her birthday yesterday, but had to miss a planned event due to having been very constipated and being uncertain about whether she would get diarrhea from all the heightened bowel meds she has been taking. · Worries about her meds, which get mixed up. We did some brainstorming about who could help her with her pill boxes, since  Tenzin's memory seems to be slipping, and he has problems with it. We settled on her sister Jai Bob and Olena Hashimoto, who would be supportive and convenient. · Having a procedure done on Wednesday per Dr. Jordon Ontiveros, feels good about that. · Being very supportive and helpful to friend Alannah Combs, who had a massive CVA. Gets a feeling of meaning & purpose form that  · Talked about dancing and singing and laughter, which are missing in her life. · Talked to her preacher about the family situation, and he will try to mediate the family conflict. Still very hurt by perceived harms.   · Struggling with resentments about what she gave up for Elizabeth Lynn in the early years of their marriage. · Needs to re-start phototherapy and hypnosis for IBS    OBJECTIVE DATA     Physical Exam:    Mental Status Evaluation:   Orientation: Alert, oriented. Mood:. anxious, a little less irritable      Affect:  anxious      Appearance:  Normal   Activity:  Normal   Gait/Posture: Halting, as per usual, not using a cane or walker   Speech:  Normal, talkative   Thought Process: Tangential   Thought Content: Within Normal Limits   Cognition:  Normal   Memory: Normal   Insight:  good   Judgment: Normal   Suicidal Ideations: Denies Suicidal Ideations   Homicidal Ideations: Denies Homicidal Ideations   Medication Side Effects: absent       Attention Span Normal     Screenings Completed in This Encounter:                                      DIAGNOSIS AND ASSESSMENT DATA     DIAGNOSIS:  See visit diagnoses. 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 Doing OK with maintenance sessions. · Anxiety continues to be an issue, with reassurance-seeking her most common coping strategy. I want her to use cognitive reframing more freely. · Focus on meaning & purpose, being there for others, which she is quite good at  · Tending to focus on recent grievances with family, which is unusual for her. ASSESSMENT/PLAN   Follow-up:  No follow-ups on file. After session, I called Dr. Alis Shine and discussed possible evaluation of patient by her to address irritability/agitation (made worse by the CVA and marital issues). For patient:    Resume good work at [employer], when possible. Resume use of  IBS hypnosis recording (on phone)  Normally, has a very strong emotional/social support system, which is one of her great strengths.    Needs to tap into that to work on the distress with her   Has re-started bright light therapy, using it regularly  Put some limits on your   Let go of the anxiety and resentment about [address] on [employer] assignment before next session:     [] Practice deep breathing 1-2 times per day for 15 minutes, as demonstrated in session, and/or:    [] Go to [employer]" web site and begin practicing with their free meditation podcasts    [x] Track thoughts that you think feed anxiety or depression    [] Go to [employer]\" web site, open up the \"Workbooks\" tab, and select a problem from the list that best suits your needs. Review the first module. [x] Begin to track physical activity. Even five minutes per day will be helpful.    [] Talk with your physician about whether it's OK to start fish oil (burpless) or flax oil, 1000 to 1200 mg per day    [] Most importantly, remember this guideline: \"Don't believe everything you think! \"   :)    [] Try \"Expressive Writing\" using the guidelines on the handout    [x] Start yoga class, as discussed. 1. Continue work at [employer]. Let the trainers tailor a program to your needs (once feasible)  2. Continue enjoying time with friends on a regular basis. 3. Assert yourself when it comes to [place] and the finances. 4. Practice daily relaxation training, again. 5. Start journaling  6. Re-start yoga for multiple physical issues  7. Continue to focus on improving quality of life, for now  8. Keep your phone with you at all times, so as to be able to call for help if you fall. 9. Regular social time  10. Recognize your strengths, which include facilitating connections between other people. 11. Restart bright light therapy once the weather gets gloomy  12. Continue depression support group attendance  15. Do more walking and social events  14. Reframe cognitions about marriage--how to make things work  15. Spend more time with the friends who make you laugh  12. Continue to use the card I gave you, with the coping strategies.   Continue with better assertion re: health needs. Continue with self-care and \"Serenity Prayer\" for letting go of things you cannot control or fix. Try to let go of resentment at [employer] from the past  Talk to your   Continue going to depression support group    See items under \"Plan,\" above. Session lasted 45 minutes.     Provider Signature:  Electronically signed by Berry Marie, PhD on 10/12/2020 at 9:58 AM

## 2020-10-13 RX ORDER — TRAZODONE HYDROCHLORIDE 50 MG/1
TABLET ORAL
Qty: 90 TABLET | Refills: 0 | Status: SHIPPED | OUTPATIENT
Start: 2020-10-13 | End: 2020-10-13 | Stop reason: SDUPTHER

## 2020-10-13 RX ORDER — TRAZODONE HYDROCHLORIDE 50 MG/1
TABLET ORAL
Qty: 90 TABLET | Refills: 0 | Status: SHIPPED | OUTPATIENT
Start: 2020-10-13 | End: 2021-01-21 | Stop reason: SDUPTHER

## 2020-10-13 NOTE — TELEPHONE ENCOUNTER
Etta Carey called requesting a refill on the following medications:  Requested Prescriptions     Pending Prescriptions Disp Refills    traZODone (DESYREL) 50 MG tablet 90 tablet 0     Sig: TAKE 1 TABLET NIGHTLY       Date of last visit: 9/16/2020  Date of next visit (if applicable): N/A  Date of last refill: 06/24/2020  Pharmacy Name: [address] [place] [address]      Thanks,  Leigh Salomon, [facility]

## 2020-10-16 ENCOUNTER — TELEPHONE (OUTPATIENT)
Dept: FAMILY MEDICINE CLINIC | Age: 65
End: 2020-10-16

## 2020-10-16 NOTE — TELEPHONE ENCOUNTER
----- Message from Jermey Mosley MD sent at 10/14/2020  5:23 PM EDT -----  Regarding: Colonoscopy report  Please obtain [facility].

## 2020-10-16 NOTE — TELEPHONE ENCOUNTER
----- Message from Luis Alberto Aviles MD sent at 10/14/2020  5:23 PM EDT -----  Regarding: Colonoscopy report  Please obtain colonoscopy records from [facility].

## 2020-10-20 ENCOUNTER — NURSE ONLY (OUTPATIENT)
Dept: FAMILY MEDICINE CLINIC | Age: 65
End: 2020-10-20
Payer: MEDICARE

## 2020-10-20 ENCOUNTER — HOSPITAL ENCOUNTER (OUTPATIENT)
Age: 65
Discharge: HOME | End: 2020-10-20
Payer: MEDICARE

## 2020-10-20 DIAGNOSIS — R73.01 ELEVATED FASTING BLOOD SUGAR: ICD-10-CM

## 2020-10-20 LAB
AVERAGE GLUCOSE: 108 MG/DL (ref 70–126)
HBA1C MFR BLD: 5.6 % (ref 4.4–6.4)

## 2020-10-20 PROCEDURE — 90694 VACC AIIV4 NO PRSRV 0.5ML IM: CPT | Performed by: FAMILY MEDICINE

## 2020-10-20 PROCEDURE — G0008 ADMIN INFLUENZA VIRUS VAC: HCPCS | Performed by: FAMILY MEDICINE

## 2020-10-20 PROCEDURE — 83036 HEMOGLOBIN GLYCOSYLATED A1C: CPT

## 2020-10-20 PROCEDURE — 36415 COLL VENOUS BLD VENIPUNCTURE: CPT

## 2020-10-20 NOTE — PROGRESS NOTES
Immunization(s) given during visit:    Immunizations Administered     Name Date Dose Route    Influenza, Quadv, adjuvanted, 65 yrs +, IM, PF (Fluad) 10/20/2020 0.5 mL Intramuscular    Site: Deltoid- Left    Lot: [id number]    NDC: 64691-730-70

## 2020-11-11 RX ORDER — DIVALPROEX SODIUM 250 MG/1
250 TABLET, EXTENDED RELEASE ORAL DAILY
Qty: 30 TABLET | Refills: 2 | Status: SHIPPED | OUTPATIENT
Start: 2020-11-11 | End: 2021-01-21

## 2020-11-11 NOTE — TELEPHONE ENCOUNTER
Osmel Kramer called requesting a refill on the following medications:  Requested Prescriptions     Pending Prescriptions Disp Refills    divalproex (DEPAKOTE ER) 250 MG extended release tablet 30 tablet 2     Sig: Take 1 tablet by mouth daily       Date of last visit: 9/16/2020  Date of next visit (if applicable):N/A  Date of last refill: 09/16/2020  Pharmacy Name: [address] [place] [address]      Arlene,  Dionna Narvaez, [address]

## 2020-11-16 ENCOUNTER — OFFICE VISIT (OUTPATIENT)
Dept: PSYCHOLOGY | Age: 65
End: 2020-11-16
Payer: MEDICARE

## 2020-11-16 PROCEDURE — 90834 PSYTX W PT 45 MINUTES: CPT | Performed by: PSYCHOLOGIST

## 2020-11-16 NOTE — PROGRESS NOTES
bit irritable      Affect:  anxious      Appearance:  Normal   Activity:  Normal   Gait/Posture: Halting, as per usual, not using a cane or walker   Speech:  Normal, talkative   Thought Process: Tangential   Thought Content: Within Normal Limits   Cognition:  Normal   Memory: Normal   Insight:  good   Judgment: Normal   Suicidal Ideations: Denies Suicidal Ideations   Homicidal Ideations: Denies Homicidal Ideations   Medication Side Effects: absent       Attention Span Normal     Screenings Completed in This Encounter:                                      DIAGNOSIS AND ASSESSMENT DATA     DIAGNOSIS:  See visit diagnoses. 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 Doing OK with maintenance sessions. · Anxiety continues to be an issue, with reassurance-seeking her most common coping strategy. I want her to use cognitive reframing more freely. · Focus on meaning & purpose, being there for others, which she is quite good at  · Tending to focus on recent grievances with family, which is unusual for her. \"It broke my heart. \" Very all-or-nothing thinking on a recent event. ASSESSMENT/PLAN   Follow-up:  Return in about 4 weeks (around 12/14/2020). After session, I called Dr. Reema Landis and discussed possible evaluation of patient by her to address irritability/agitation (made worse by the CVA and marital issues). For patient:    · Resume good work at [employer], when possible. · Resume use of  IBS hypnosis recording (on phone)  · Normally, has a very strong emotional/social support system, which is one of her great strengths.    · Needs to tap into that to work on the distress with her   · Has re-started bright light therapy, using it regularly  · Put some limits on your   · Let go of the anxiety and resentment about [place]--negotiate a truce with the others in the family  · Focus on gratitudes    Homework assignment before next session:     [] Practice deep breathing 1-2 times per day for 15 minutes, as demonstrated in session, and/or:    [] Go to [employer] [facility]\" web site and begin practicing with their free meditation podcasts    [x] Track thoughts that you think feed anxiety or depression    [] Go to [employer] [facility] [employer]\" web site, open up the \"Workbooks\" tab, and select a problem from the list that best suits your needs. Review the first module. [x] Begin to track physical activity. Even five minutes per day will be helpful.    [] Talk with your physician about whether it's OK to start fish oil (burpless) or flax oil, 1000 to 1200 mg per day    [] Most importantly, remember this guideline: \"Don't believe everything you think! \"   :)    [] Try \"Expressive Writing\" using the guidelines on the handout    [x] Start yoga class, as discussed. 1. Continue work at [employer]. Let the trainers tailor a program to your needs (once feasible)  2. Continue enjoying time with friends on a regular basis. 3. Assert yourself when it comes to [facility] and the finances. 4. Practice daily relaxation training, again. 5. Start journaling  6. Re-start yoga for multiple physical issues  7. Continue to focus on improving quality of life, for now  8. Keep your phone with you at all times, so as to be able to call for help if you fall. 9. Regular social time  10. Recognize your strengths, which include facilitating connections between other people. 11. Restart bright light therapy once the weather gets gloomy  12. Continue depression support group attendance  15. Do more walking and social events  14. Reframe cognitions about marriage--how to make things work  15. Spend more time with the friends who make you laugh  12. Continue to use the card I gave you, with the coping strategies.   · Continue with better assertion re: health needs--feel free to postpone your procedure if you are more comfortable with that. · Also set limits with friends when going shopping  · Continue with self-care and \"Serenity Prayer\" for letting go of things you cannot control or fix. · Try to let go of resentment at [employer] [facility] from the past  · Continue going to depression support group  · Get out your light box and begin phototherapy    See items under \"Plan,\" above. Session lasted 50 minutes.     Provider Signature:  Electronically signed by David Altamirano, PhD on 11/16/2020 at 10:51 AM

## 2020-12-08 ENCOUNTER — PATIENT MESSAGE (OUTPATIENT)
Dept: FAMILY MEDICINE CLINIC | Age: 65
End: 2020-12-08

## 2020-12-08 RX ORDER — CHLORDIAZEPOXIDE HYDROCHLORIDE AND CLIDINIUM BROMIDE 5; 2.5 MG/1; MG/1
1 CAPSULE ORAL 2 TIMES DAILY
Qty: 180 CAPSULE | Refills: 0 | Status: SHIPPED | OUTPATIENT
Start: 2020-12-08 | End: 2021-06-11 | Stop reason: SDUPTHER

## 2020-12-08 NOTE — TELEPHONE ENCOUNTER
From: Diamond Graves  To: Sergey Barraza MD  Sent: 12/8/2020 12:03 PM EST  Subject: Prescription Question    I am out of chlordiazepoxide/clidinium 2.5 MG C.  Could you please call a prescription for me 90day supply to [facility] on [address] [address]

## 2020-12-08 NOTE — TELEPHONE ENCOUNTER
Kaur Plummer called requesting a refill on the following medications:  Requested Prescriptions     Pending Prescriptions Disp Refills    chlordiazePOXIDE-clidinium (LIBRAX) 5-2.5 MG per capsule 180 capsule 0     Sig: Take 1 capsule by mouth 2 times daily.  TAKE 1 CAPSULE TWICE A DAY AS NEEDED FOR PAIN       Date of last visit: 9/16/2020  Date of next visit (if applicable):n/a  Date of last refill: 09/16/2020  Pharmacy Name: [address] [place] [address]      Arlene,  Yamel Moura, [address]

## 2020-12-14 ENCOUNTER — OFFICE VISIT (OUTPATIENT)
Dept: PSYCHOLOGY | Age: 65
End: 2020-12-14
Payer: MEDICARE

## 2020-12-14 PROCEDURE — 90834 PSYTX W PT 45 MINUTES: CPT | Performed by: PSYCHOLOGIST

## 2020-12-14 PROCEDURE — 1036F TOBACCO NON-USER: CPT | Performed by: PSYCHOLOGIST

## 2020-12-14 NOTE — PROGRESS NOTES
[facility]  [place] [address]  [address]  [address] [facility]  Dept: [phone]  Dept Fax: [phone]: [phone]    This note will not be viewable in [facility] for the following reason(s). This is a Psychotherapy Note. Patient: Wallace Hou  Visit Date: 12/14/2020  Referring Provider:   No ref. provider found    SUBJECTIVE DATA     CHIEF COMPLAINT:    Chief Complaint   Patient presents with    Anxiety    Depression       Patient update: This session was conducted as a face-to-face meeting, per patient's request, with appropriate social distancing and both patient and psychologist wearing masks. Patient reports   · Depressing time of year, missing her mom and how she celebrated the holidays  · [address] got COVID, very sick, but expected to make it  · Taking care of friend Vaishnavi's cat, while Clista Klinefelter is in the nursing home. Also getting flowers for her convalescence, and feeling good about doing those things  · Gets really bad headache when she gets frustrated with  [employer], and has to meditate to try to get better  · Many resentments about years of [employer] controlling finances and what they do or don't do in the house  · For example, she has no grab bar by the shower, and uses the shower curtain to stabilize herself. She knows this is unsafe, \"but [employer]y [place] doesn't want to put in a grab bar because it will put holes in the shower enclosure. \" I worked with her about the need to have a friend put it in, and take control despite Tenzin's objections, for her basic safety needs. She has a stepdaughter Jessica who's an OT (and a neighbor Kp Young), and I encouraged her to go that route.   · We talked further about how to empower her  · Also working on forgiveness of things she can't control with her family, making some progress on that · Dr. Rivera Morning will be doing a balloon procedure, which is supposed to be in January. When she takes all the prescribed meds, along with enema/suppository, she is mostly moving her bowels    OBJECTIVE DATA     Physical Exam:    Mental Status Evaluation:   Orientation: Alert, oriented. Mood:. anxious, a bit irritable      Affect:  anxious      Appearance:  Normal   Activity:  Normal   Gait/Posture: Halting, as per usual, not using a cane or walker   Speech:  Normal, talkative   Thought Process: Tangential   Thought Content: Within Normal Limits   Cognition:  Normal   Memory: Normal   Insight:  good   Judgment: Normal   Suicidal Ideations: Denies Suicidal Ideations   Homicidal Ideations: Denies Homicidal Ideations   Medication Side Effects: absent       Attention Span Normal     Screenings Completed in This Encounter:                                      DIAGNOSIS AND ASSESSMENT DATA     DIAGNOSIS:  See visit diagnoses. 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 Doing OK with maintenance sessions. · Anxiety continues to be an issue, with reassurance-seeking her most common coping strategy. I want her to use cognitive reframing more freely. · Focus on meaning & purpose, being there for others, which she is quite good at  · Tending to focus on recent grievances with family, which is unusual for her. \"It broke my heart. \" Very all-or-nothing thinking on a recent event. ASSESSMENT/PLAN   Follow-up:  No follow-ups on file. For patient:    · Resume good work at [employer], when possible. · Resume use of  IBS hypnosis recording (on phone)  · Normally, has a very strong emotional/social support system, which is one of her great strengths.    · Needs to tap into that to work on the distress with her   · Has re-started bright light therapy, using it regularly · Continue with some limits on your   · Let go of the anxiety and resentment about [place]--negotiate a truce with the others in the family  · Focus on gratitudes    Homework assignment before next session:     [] Practice deep breathing 1-2 times per day for 15 minutes, as demonstrated in session, and/or:    [] Go to [employer] [facility]\" web site and begin practicing with their free meditation podcasts    [x] Track thoughts that you think feed anxiety or depression    [] Go to [employer] [facility] [employer]\" web site, open up the \"Workbooks\" tab, and select a problem from the list that best suits your needs. Review the first module. [x] Begin to track physical activity. Even five minutes per day will be helpful.    [] Talk with your physician about whether it's OK to start fish oil (burpless) or flax oil, 1000 to 1200 mg per day    [] Most importantly, remember this guideline: \"Don't believe everything you think! \"   :)    [] Try \"Expressive Writing\" using the guidelines on the handout    [x] Start yoga class, as discussed. 1. Continue work at [employer]. Let the trainers tailor a program to your needs (once feasible)  2. Continue enjoying time with friends on a regular basis. 3. Assert yourself when it comes to [employer] and the finances. 4. Practice daily relaxation training, again. 5. Start journaling  6. Re-start yoga for multiple physical issues  7. Continue to focus on improving quality of life, for now  8. Keep your phone with you at all times, so as to be able to call for help if you fall. 9. Regular social time  10. Recognize your strengths, which include facilitating connections between other people. 11. Restart bright light therapy once the weather gets gloomy  12. Continue depression support group attendance  15. Do more walking and social events  14.  Reframe cognitions about marriage--how to make things work 13. Spend more time with the friends who make you laugh  16. Continue to use the card I gave you, with the coping strategies. · Continue with better assertion re: health needs--feel free to postpone your procedure if you are more comfortable with that. · Also set limits with friends when going shopping  · Continue with self-care and \"Serenity Prayer\" for letting go of things you cannot control or fix. · Let go of resentment at [facility] from the past  · Continue going to depression support group once it resumes    See items under \"Plan,\" above. Session lasted 50 minutes.     Provider Signature:  Electronically signed by Cathi Mitchell, PhD on 12/14/2020 at 10:56 AM

## 2021-01-05 ENCOUNTER — HOSPITAL ENCOUNTER (OUTPATIENT)
Age: 66
Discharge: HOME | End: 2021-01-05
Payer: MEDICARE

## 2021-01-05 DIAGNOSIS — Z51.81 MEDICATION MONITORING ENCOUNTER: ICD-10-CM

## 2021-01-05 DIAGNOSIS — I10 ESSENTIAL HYPERTENSION: ICD-10-CM

## 2021-01-05 DIAGNOSIS — E83.51 HYPOCALCEMIA: ICD-10-CM

## 2021-01-05 DIAGNOSIS — N18.4 STAGE 4 CHRONIC KIDNEY DISEASE (HCC): ICD-10-CM

## 2021-01-05 DIAGNOSIS — M81.0 AGE-RELATED OSTEOPOROSIS WITHOUT CURRENT PATHOLOGICAL FRACTURE: ICD-10-CM

## 2021-01-05 LAB
ANION GAP SERPL CALCULATED.3IONS-SCNC: 9 MEQ/L (ref 8–16)
BACTERIA: ABNORMAL
BASOPHILS # BLD: 0.9 %
BASOPHILS ABSOLUTE: 0 THOU/MM3 (ref 0–0.1)
BILIRUBIN URINE: NEGATIVE
BLOOD, URINE: NEGATIVE
BUN BLDV-MCNC: 26 MG/DL (ref 7–22)
CALCIUM SERPL-MCNC: 10 MG/DL (ref 8.5–10.5)
CASTS: ABNORMAL /LPF
CASTS: ABNORMAL /LPF
CHARACTER, URINE: ABNORMAL
CHLORIDE BLD-SCNC: 104 MEQ/L (ref 98–111)
CO2: 32 MEQ/L (ref 23–33)
COLOR: YELLOW
CREAT SERPL-MCNC: 1.6 MG/DL (ref 0.4–1.2)
CREATININE URINE: 148.4 MG/DL
CRYSTALS: ABNORMAL
EOSINOPHIL # BLD: 3.1 %
EOSINOPHILS ABSOLUTE: 0.2 THOU/MM3 (ref 0–0.4)
EPITHELIAL CELLS, UA: ABNORMAL /HPF
ERYTHROCYTE [DISTWIDTH] IN BLOOD BY AUTOMATED COUNT: 12.6 % (ref 11.5–14.5)
ERYTHROCYTE [DISTWIDTH] IN BLOOD BY AUTOMATED COUNT: 46 FL (ref 35–45)
GFR SERPL CREATININE-BSD FRML MDRD: 32 ML/MIN/1.73M2
GLUCOSE BLD-MCNC: 88 MG/DL (ref 70–108)
GLUCOSE, URINE: NEGATIVE MG/DL
HCT VFR BLD CALC: 36.6 % (ref 37–47)
HEMOGLOBIN: 11.4 GM/DL (ref 12–16)
IMMATURE GRANS (ABS): 0.02 THOU/MM3 (ref 0–0.07)
IMMATURE GRANULOCYTES: 0.4 %
KETONES, URINE: NEGATIVE
LEUKOCYTE ESTERASE, URINE: ABNORMAL
LYMPHOCYTES # BLD: 37.4 %
LYMPHOCYTES ABSOLUTE: 2.1 THOU/MM3 (ref 1–4.8)
MCH RBC QN AUTO: 30.9 PG (ref 26–33)
MCHC RBC AUTO-ENTMCNC: 31.1 GM/DL (ref 32.2–35.5)
MCV RBC AUTO: 99.2 FL (ref 81–99)
MISCELLANEOUS LAB TEST RESULT: ABNORMAL
MONOCYTES # BLD: 7.9 %
MONOCYTES ABSOLUTE: 0.4 THOU/MM3 (ref 0.4–1.3)
NITRITE, URINE: NEGATIVE
NUCLEATED RED BLOOD CELLS: 0 /100 WBC
PH UA: 6 (ref 5–9)
PLATELET # BLD: 240 THOU/MM3 (ref 130–400)
PMV BLD AUTO: 10.8 FL (ref 9.4–12.4)
POTASSIUM SERPL-SCNC: 4 MEQ/L (ref 3.5–5.2)
PROTEIN UA: NEGATIVE MG/DL
PROTEIN, URINE: 22.7 MG/DL
RBC # BLD: 3.69 MILL/MM3 (ref 4.2–5.4)
RBC URINE: ABNORMAL /HPF
RENAL EPITHELIAL, UA: ABNORMAL
SEG NEUTROPHILS: 50.3 %
SEGMENTED NEUTROPHILS ABSOLUTE COUNT: 2.8 THOU/MM3 (ref 1.8–7.7)
SODIUM BLD-SCNC: 145 MEQ/L (ref 135–145)
SPECIFIC GRAVITY UA: 1.02 (ref 1–1.03)
UROBILINOGEN, URINE: 0.2 EU/DL (ref 0–1)
VITAMIN D 25-HYDROXY: 51 NG/ML (ref 30–100)
WBC # BLD: 5.5 THOU/MM3 (ref 4.8–10.8)
WBC UA: ABNORMAL /HPF
YEAST: ABNORMAL

## 2021-01-05 PROCEDURE — 84156 ASSAY OF PROTEIN URINE: CPT

## 2021-01-05 PROCEDURE — 82570 ASSAY OF URINE CREATININE: CPT

## 2021-01-05 PROCEDURE — 36415 COLL VENOUS BLD VENIPUNCTURE: CPT

## 2021-01-05 PROCEDURE — 80048 BASIC METABOLIC PNL TOTAL CA: CPT

## 2021-01-05 PROCEDURE — 81001 URINALYSIS AUTO W/SCOPE: CPT

## 2021-01-05 PROCEDURE — 85025 COMPLETE CBC W/AUTO DIFF WBC: CPT

## 2021-01-05 PROCEDURE — 82784 ASSAY IGA/IGD/IGG/IGM EACH: CPT

## 2021-01-05 PROCEDURE — 82306 VITAMIN D 25 HYDROXY: CPT

## 2021-01-06 DIAGNOSIS — M81.0 AGE-RELATED OSTEOPOROSIS WITHOUT CURRENT PATHOLOGICAL FRACTURE: Primary | ICD-10-CM

## 2021-01-06 LAB — IGG: 626 MG/DL (ref 700–1600)

## 2021-01-07 ENCOUNTER — OFFICE VISIT (OUTPATIENT)
Dept: NEPHROLOGY | Age: 66
End: 2021-01-07
Payer: MEDICARE

## 2021-01-07 ENCOUNTER — OFFICE VISIT (OUTPATIENT)
Dept: PSYCHIATRY | Age: 66
End: 2021-01-07
Payer: MEDICARE

## 2021-01-07 VITALS — BODY MASS INDEX: 25.8 KG/M2 | HEIGHT: 60 IN | WEIGHT: 131.4 LBS

## 2021-01-07 VITALS
OXYGEN SATURATION: 97 % | TEMPERATURE: 97.2 F | DIASTOLIC BLOOD PRESSURE: 70 MMHG | BODY MASS INDEX: 25.58 KG/M2 | WEIGHT: 131 LBS | SYSTOLIC BLOOD PRESSURE: 98 MMHG | HEART RATE: 68 BPM

## 2021-01-07 DIAGNOSIS — N18.32 STAGE 3B CHRONIC KIDNEY DISEASE (HCC): ICD-10-CM

## 2021-01-07 DIAGNOSIS — F41.1 GENERALIZED ANXIETY DISORDER: Primary | ICD-10-CM

## 2021-01-07 DIAGNOSIS — I10 ESSENTIAL HYPERTENSION: Primary | ICD-10-CM

## 2021-01-07 DIAGNOSIS — F33.1 MODERATE EPISODE OF RECURRENT MAJOR DEPRESSIVE DISORDER (HCC): ICD-10-CM

## 2021-01-07 PROCEDURE — 1090F PRES/ABSN URINE INCON ASSESS: CPT | Performed by: INTERNAL MEDICINE

## 2021-01-07 PROCEDURE — G8417 CALC BMI ABV UP PARAM F/U: HCPCS | Performed by: INTERNAL MEDICINE

## 2021-01-07 PROCEDURE — 99213 OFFICE O/P EST LOW 20 MIN: CPT | Performed by: INTERNAL MEDICINE

## 2021-01-07 PROCEDURE — 4040F PNEUMOC VAC/ADMIN/RCVD: CPT | Performed by: INTERNAL MEDICINE

## 2021-01-07 PROCEDURE — G8427 DOCREV CUR MEDS BY ELIG CLIN: HCPCS | Performed by: INTERNAL MEDICINE

## 2021-01-07 PROCEDURE — 1036F TOBACCO NON-USER: CPT | Performed by: INTERNAL MEDICINE

## 2021-01-07 PROCEDURE — G8399 PT W/DXA RESULTS DOCUMENT: HCPCS | Performed by: INTERNAL MEDICINE

## 2021-01-07 PROCEDURE — 3017F COLORECTAL CA SCREEN DOC REV: CPT | Performed by: INTERNAL MEDICINE

## 2021-01-07 PROCEDURE — 1123F ACP DISCUSS/DSCN MKR DOCD: CPT | Performed by: INTERNAL MEDICINE

## 2021-01-07 PROCEDURE — 90792 PSYCH DIAG EVAL W/MED SRVCS: CPT | Performed by: NURSE PRACTITIONER

## 2021-01-07 PROCEDURE — 1036F TOBACCO NON-USER: CPT | Performed by: NURSE PRACTITIONER

## 2021-01-07 PROCEDURE — G8484 FLU IMMUNIZE NO ADMIN: HCPCS | Performed by: INTERNAL MEDICINE

## 2021-01-07 RX ORDER — TRIAMCINOLONE ACETONIDE 55 UG/1
2 SPRAY, METERED NASAL DAILY
COMMUNITY

## 2021-01-07 RX ORDER — AMLODIPINE BESYLATE 10 MG/1
10 TABLET ORAL DAILY
Qty: 90 TABLET | Refills: 3 | Status: SHIPPED | OUTPATIENT
Start: 2021-01-07 | End: 2021-01-29

## 2021-01-07 RX ORDER — HYDROXYZINE PAMOATE 25 MG/1
25 CAPSULE ORAL 3 TIMES DAILY PRN
Qty: 30 CAPSULE | Refills: 0 | Status: SHIPPED | OUTPATIENT
Start: 2021-01-07 | End: 2021-06-29

## 2021-01-07 RX ORDER — DICLOFENAC SODIUM 20 MG/G
SOLUTION TOPICAL PRN
COMMUNITY
End: 2021-01-21

## 2021-01-07 RX ORDER — BUPROPION HYDROCHLORIDE 200 MG/1
200 TABLET, EXTENDED RELEASE ORAL 2 TIMES DAILY
Qty: 60 TABLET | Refills: 1 | Status: SHIPPED | OUTPATIENT
Start: 2021-01-07 | End: 2021-03-04 | Stop reason: SDUPTHER

## 2021-01-07 SDOH — ECONOMIC STABILITY: TRANSPORTATION INSECURITY
IN THE PAST 12 MONTHS, HAS LACK OF TRANSPORTATION KEPT YOU FROM MEETINGS, WORK, OR FROM GETTING THINGS NEEDED FOR DAILY LIVING?: NO

## 2021-01-07 SDOH — ECONOMIC STABILITY: INCOME INSECURITY: HOW HARD IS IT FOR YOU TO PAY FOR THE VERY BASICS LIKE FOOD, HOUSING, MEDICAL CARE, AND HEATING?: NOT HARD AT ALL

## 2021-01-07 SDOH — ECONOMIC STABILITY: FOOD INSECURITY: WITHIN THE PAST 12 MONTHS, YOU WORRIED THAT YOUR FOOD WOULD RUN OUT BEFORE YOU GOT MONEY TO BUY MORE.: NEVER TRUE

## 2021-01-07 SDOH — ECONOMIC STABILITY: FOOD INSECURITY: WITHIN THE PAST 12 MONTHS, THE FOOD YOU BOUGHT JUST DIDN'T LAST AND YOU DIDN'T HAVE MONEY TO GET MORE.: NEVER TRUE

## 2021-01-07 SDOH — ECONOMIC STABILITY: TRANSPORTATION INSECURITY
IN THE PAST 12 MONTHS, HAS THE LACK OF TRANSPORTATION KEPT YOU FROM MEDICAL APPOINTMENTS OR FROM GETTING MEDICATIONS?: NO

## 2021-01-07 ASSESSMENT — ANXIETY QUESTIONNAIRES
1. FEELING NERVOUS, ANXIOUS, OR ON EDGE: 3-NEARLY EVERY DAY
5. BEING SO RESTLESS THAT IT IS HARD TO SIT STILL: 1-SEVERAL DAYS

## 2021-01-07 ASSESSMENT — PATIENT HEALTH QUESTIONNAIRE - PHQ9
10. IF YOU CHECKED OFF ANY PROBLEMS, HOW DIFFICULT HAVE THESE PROBLEMS MADE IT FOR YOU TO DO YOUR WORK, TAKE CARE OF THINGS AT HOME, OR GET ALONG WITH OTHER PEOPLE: 1
SUM OF ALL RESPONSES TO PHQ QUESTIONS 1-9: 9
6. FEELING BAD ABOUT YOURSELF - OR THAT YOU ARE A FAILURE OR HAVE LET YOURSELF OR YOUR FAMILY DOWN: 3
5. POOR APPETITE OR OVEREATING: 0
3. TROUBLE FALLING OR STAYING ASLEEP: 1
SUM OF ALL RESPONSES TO PHQ QUESTIONS 1-9: 9
9. THOUGHTS THAT YOU WOULD BE BETTER OFF DEAD, OR OF HURTING YOURSELF: 0
1. LITTLE INTEREST OR PLEASURE IN DOING THINGS: 0
4. FEELING TIRED OR HAVING LITTLE ENERGY: 3

## 2021-01-07 NOTE — PROGRESS NOTES
[facility]        Outpatient Follow-Up note         1/7/2021 10:50 AM    Patient Name:   Pedro Herzog  YOB: 1955  Primary Care Physician:  Jennifer Mir MD      Chief Complaint / Reason for follow-up : Follow Up of CKD     Interval History :  Patient seen and examined by me. Feels well. No CP or SOB . Some back pain  No hospitalizations or med changes  Patient does not have any urinary symptoms at this time     Past History :  Past Medical History:   Diagnosis Date    Allergic rhinitis     Anxiety     CVA (cerebral infarction)     Depression     Fibromyalgia     Headache(784.0)     Hyperlipidemia     Hypertension     Irritable bowel syndrome     Kidney failure     Unspecified cerebral artery occlusion with cerebral infarction     Unspecified sleep apnea      Past Surgical History:   Procedure Laterality Date    CARPAL TUNNEL RELEASE Right     [facility]  2012    [facility]    ENDOSCOPY, COLON, DIAGNOSTIC  unsure    EYE SURGERY      lasik    HEMORRHOID SURGERY  unsure    HYSTERECTOMY      total    NECK SURGERY  18  years ago    fusion    SINUS SURGERY  10 years ago    TUBAL LIGATION          Medications :     Outpatient Medications Marked as Taking for the 1/7/21 encounter (Office Visit) with Heidi Graham MD   Medication Sig Dispense Refill    Diclofenac Sodium 2 % SOLN Place onto the skin as needed      triamcinolone (NASACORT ALLERGY 24HR) 55 MCG/ACT nasal inhaler 2 sprays by Each Nostril route daily As needed      chlordiazePOXIDE-clidinium (LIBRAX) 5-2.5 MG per capsule Take 1 capsule by mouth 2 times daily.  TAKE 1 CAPSULE TWICE A DAY AS NEEDED FOR PAIN 180 capsule 0    divalproex (DEPAKOTE ER) 250 MG extended release tablet Take 1 tablet by mouth daily 30 tablet 2    traZODone (DESYREL) 50 MG tablet TAKE 1 TABLET NIGHTLY 90 tablet 0  calcium-cholecalciferol (OYSCO 500 + D) 500-200 MG-UNIT per tablet Take 2 tablets by mouth 2 times daily 360 tablet 1    pantoprazole (PROTONIX) 40 MG tablet Take 40 mg by mouth daily      Polyethylene Glycol 3350 (MIRALAX PO) Take by mouth      buPROPion (WELLBUTRIN SR) 150 MG extended release tablet TAKE 1 TABLET TWICE A  tablet 1    simvastatin (ZOCOR) 20 MG tablet TAKE 1 TABLET DAILY 90 tablet 1    cetirizine (ZYRTEC ALLERGY) 10 MG tablet Take 1 tablet by mouth daily 30 tablet 11    clopidogrel (PLAVIX) 75 MG tablet TAKE 1 TABLET DAILY 90 tablet 1    vitamin D (ERGOCALCIFEROL) 1.25 MG (60166 UT) CAPS capsule Take 1 capsule by mouth once a week 12 capsule 1    amLODIPine (NORVASC) 10 MG tablet Take 1 tablet by mouth daily 90 tablet 3    Tetrahydrozoline-Zn Sulfate (EYE DROPS ALLERGY RELIEF OP) Apply 1 drop to eye daily      Probiotic Product (PROBIOTIC-10) CHEW Take by mouth daily      linaclotide (LINZESS) 145 MCG capsule Take 145 mcg by mouth every morning (before breakfast)      MAGNESIUM CITRATE PO Take by mouth as needed (weekly)      denosumab (PROLIA) 60 MG/ML SOLN SC injection Inject 60 mg into the skin once      folic acid (FOLVITE) 222 MCG tablet Take 400 mcg by mouth daily      b complex vitamins capsule Take 1 capsule by mouth daily 100 capsule 3    FISH OIL 1,000 mg by Does not apply route 2 times daily Indications: Decreased Energy       aspirin 81 MG tablet Take 81 mg by mouth daily.            Vitals     BP 98/70 (Site: Right Upper Arm, Position: Sitting, Cuff Size: Medium Adult)   Pulse 68   Temp 97.2 °F (36.2 °C)   Wt 131 lb (59.4 kg)   SpO2 97%   BMI 25.58 kg/m²  Wt Readings from Last 3 Encounters:   01/07/21 131 lb (59.4 kg)   09/25/20 131 lb 6.4 oz (59.6 kg)   09/18/20 132 lb (59.9 kg)        Physical Exam     General -- no distress  Lungs -- clear  Heart -- S1, S2 heard, JVD - no  Abdomen - soft, non-tender  Extremities -- no edema  CNS - awake and alert Labs, Radiology and Tests    Labs -      1/18 7/18 12/18 8/19 7/20 1/20         Potassium  4.8  5.1  4.2  4.1  4.3 4.0         BUN  30  34  25  29  26 26         Creatinine  1.6  1.8  1.6  1.8  1.7 1.6         eGFR  32  28  32  28  30 32                              UPCR  < 200    < 200  100 110  < 300         UMCR  13      6  13                                 12/18 - calcium 9.4 phosphorus 3.2 vitamin D 19 and PTH 83  3/9 - calcium - 9.4     Renal Ultrasound Scan -- 1/2018  Rt kidney 9 cm/ left kidney 9 cm. Hypoplastic kidneys . Elevated resistive indices . 5 mm cyst rt kidney      Other : old  lab data and PCP notes have been reviewed and noted patient's creatinine was around 1.5 in 2016 and in 2014 it was 0.7 A CT scan which was done in 2014 which was negative for any abnormalities of the kidney.     Assessment    1. Renal - as per MDRD patient's GFR is around 32 ML per minute minute. Stable at baseline.    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 2. Essential hypertension -running slightly lower advised home blood pressure monitoring and hold Norvasc for the next 3 days and take it if it is more than 1 20 systolic  3. Electrolytes - WNL  4. Diabetes mellitus - not on any meds   5. History of stroke in 2013   6. Severe osteoporosis- on prolia continues to have back pain  7. Medications reviewed discussed with the patient and  in detail. 8. Follow-up in 6 months    Tests and orders placed this Encounter     No orders of the defined types were placed in this encounter. Ambrose Alanis M.D  [facility].
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
## 2021-01-11 ENCOUNTER — TELEPHONE (OUTPATIENT)
Dept: PSYCHOLOGY | Age: 66
End: 2021-01-11

## 2021-01-11 NOTE — TELEPHONE ENCOUNTER
I left a message on patient's phone after her no-show, since it is very unusual for her to miss an appointment. I encouraged her to reschedule if she wished.

## 2021-01-20 NOTE — PROGRESS NOTES
[address] [place] [address] [address], [address], [place]  Phone:  [phone]  LSY:[phone]       Name: Orion Preston  : 1955    Chief Complaint   Patient presents with    Medicare AWV     having some dizziness for the last 3 weeks        HPI:     Orion Preston is a 77 y.o. female who presents today for evaluation of dizziness for the past 3 weeks. She was having LLQ abdominal pain so she wasn't eating much. She reports that she has still been drinking quite a bit. She had a BM yesterday after taking Linzess and her abdominal pain is better, but she still feels dizzy. Symptoms are worst when she moves around, like standing up from a chair. She's had a few recent falls tripping over objects like shoes, but denies striking her head or injuring herself.   She denies chest pain, palpitations, SOB, etc.      Current Outpatient Medications:     simethicone (MI-ACID GAS RELIEF) 80 MG chewable tablet, Take 80 mg by mouth 2 times daily, Disp: , Rfl:     vitamin D (ERGOCALCIFEROL) 1.25 MG (95736 UT) CAPS capsule, Take 1 capsule by mouth once a week, Disp: 12 capsule, Rfl: 1    clopidogrel (PLAVIX) 75 MG tablet, TAKE 1 TABLET DAILY, Disp: 90 tablet, Rfl: 1    simvastatin (ZOCOR) 20 MG tablet, TAKE 1 TABLET DAILY, Disp: 90 tablet, Rfl: 1    pantoprazole (PROTONIX) 40 MG tablet, Take 1 tablet by mouth daily, Disp: 90 tablet, Rfl: 1    calcium-cholecalciferol (OYSCO 500 + D) 500-200 MG-UNIT per tablet, Take 2 tablets by mouth 2 times daily, Disp: 360 tablet, Rfl: 1    traZODone (DESYREL) 50 MG tablet, TAKE 1 TABLET NIGHTLY, Disp: 90 tablet, Rfl: 1    amLODIPine (NORVASC) 10 MG tablet, Take 1 tablet by mouth daily, Disp: 90 tablet, Rfl: 3    triamcinolone (NASACORT ALLERGY 24HR) 55 MCG/ACT nasal inhaler, 2 sprays by Each Nostril route daily As needed, Disp: , Rfl:     buPROPion (WELLBUTRIN SR) 200 MG extended release tablet, Take 1 tablet by mouth 2 times daily TAKE 1 TABLET TWICE A DAY, Disp: 60 tablet, Rfl: 1    hydrOXYzine (VISTARIL) 25 MG capsule, Take 1 capsule by mouth 3 times daily as needed for Anxiety, Disp: 30 capsule, Rfl: 0    chlordiazePOXIDE-clidinium (LIBRAX) 5-2.5 MG per capsule, Take 1 capsule by mouth 2 times daily. TAKE 1 CAPSULE TWICE A DAY AS NEEDED FOR PAIN, Disp: 180 capsule, Rfl: 0    Polyethylene Glycol 3350 (MIRALAX PO), Take by mouth, Disp: , Rfl:     cetirizine (ZYRTEC ALLERGY) 10 MG tablet, Take 1 tablet by mouth daily, Disp: 30 tablet, Rfl: 11    Tetrahydrozoline-Zn Sulfate (EYE DROPS ALLERGY RELIEF OP), Apply 1 drop to eye daily, Disp: , Rfl:     Probiotic Product (PROBIOTIC-10) CHEW, Take by mouth daily, Disp: , Rfl:     linaclotide (LINZESS) 145 MCG capsule, Take 145 mcg by mouth every morning (before breakfast), Disp: , Rfl:     MAGNESIUM CITRATE PO, Take by mouth as needed (weekly), Disp: , Rfl:     denosumab (PROLIA) 60 MG/ML SOLN SC injection, Inject 60 mg into the skin once, Disp: , Rfl:     folic acid (FOLVITE) 624 MCG tablet, Take 400 mcg by mouth daily, Disp: , Rfl:     b complex vitamins capsule, Take 1 capsule by mouth daily, Disp: 100 capsule, Rfl: 3    FISH OIL, 1,000 mg by Does not apply route 2 times daily Indications: Decreased Energy , Disp: , Rfl:     aspirin 81 MG tablet, Take 81 mg by mouth daily. , Disp: , Rfl:     Allergies   Allergen Reactions    Actos [Pioglitazone] Nausea And Vomiting    Augmentin [Amoxicillin-Pot Clavulanate] Diarrhea    Ciprofloxacin      unsure    Other Itching     Dog, cat, pet dander    Sulfa Antibiotics Rash       Subjective:      Review of Systems   Constitutional: Negative for chills and fever. HENT: Negative for ear pain and sinus pressure. Neurological: Positive for dizziness and light-headedness. Negative for headaches.        Objective:     /86 (Site: Left Upper Arm, Position: Sitting, Cuff Size: Large Adult)   Pulse 63   Ht 5' (1.524 m)   Wt 127 lb (57.6 kg)   SpO2 99%   BMI 24.80 kg/m²     Physical Exam  Vitals signs reviewed. Constitutional:       General: She is not in acute distress. Appearance: She is well-developed. HENT:      Head: Normocephalic and atraumatic. Right Ear: Tympanic membrane, ear canal and external ear normal.      Left Ear: Tympanic membrane, ear canal and external ear normal.      Nose: Nose normal.   Eyes:      Conjunctiva/sclera: Conjunctivae normal.   Neck:      Musculoskeletal: Normal range of motion and neck supple. Cardiovascular:      Rate and Rhythm: Normal rate and regular rhythm. Heart sounds: Normal heart sounds. Pulmonary:      Effort: Pulmonary effort is normal. No respiratory distress. Breath sounds: Normal breath sounds. No wheezing. Abdominal:      General: Bowel sounds are normal. There is no distension. Palpations: Abdomen is soft. Tenderness: There is no abdominal tenderness. Skin:     General: Skin is warm and dry. Neurological:      Mental Status: She is alert and oriented to person, place, and time. Psychiatric:         Behavior: Behavior normal.       Assessment/Plan: Marvin Rasheed was seen today for medicare awv. Diagnoses and all orders for this visit:    Dizziness  -     There is some orthostatic hypotension likely from dehydration as she wasn't eating as much because of her abdominal pain. She was encouraged to push more fluids. Will check labs and EKG today for further evaluation.   -     CBC With Auto Differential; Future  -     Basic Metabolic Panel; Future  -     EKG 12 lead; Future    Pure hypercholesterolemia  -     A healthy diet and routine physical activity encouraged. 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     Electronically signed by Mindi Short MD on 1/21/2021 at 12:05 PM

## 2021-01-20 NOTE — PROGRESS NOTES
[address] [place] [address] [address], [address], [place]  Phone:  [phone]  QVF:[phone]       Medicare Annual Wellness Visit    Name: Rosa Isela Hawkins Date: 2021   MRN: [id number] Sex: Female   Age: 77 y.o. Ethnicity: Non-/Non    : 1955 Race: [place] is here for Medicare AWV (having some dizziness for the last 3 weeks )    Screenings for behavioral, psychosocial and functional/safety risks, and cognitive dysfunction are all negative except as indicated below. These results, as well as other patient data from the [address] form, are documented in Flowsheets linked to this Encounter. Allergies   Allergen Reactions    Actos [Pioglitazone] Nausea And Vomiting    Augmentin [Amoxicillin-Pot Clavulanate] Diarrhea    Ciprofloxacin      unsure    Other Itching     Dog, cat, pet dander    Sulfa Antibiotics Rash       Prior to Visit Medications    Medication Sig Taking?  Authorizing Provider   simethicone (MI-ACID GAS RELIEF) 80 MG chewable tablet Take 80 mg by mouth 2 times daily Yes Historical Provider, MD   vitamin D (ERGOCALCIFEROL) 1.25 MG (75679 UT) CAPS capsule Take 1 capsule by mouth once a week Yes Divya Vargas MD   clopidogrel (PLAVIX) 75 MG tablet TAKE 1 TABLET DAILY Yes Divya Vargas MD   simvastatin (ZOCOR) 20 MG tablet TAKE 1 TABLET DAILY Yes Divya Vargas MD   pantoprazole (PROTONIX) 40 MG tablet Take 1 tablet by mouth daily Yes Divya Vargas MD   calcium-cholecalciferol (OYSCO 500 + D) 500-200 MG-UNIT per tablet Take 2 tablets by mouth 2 times daily Yes Divya Vargas MD   traZODone (DESYREL) 50 MG tablet TAKE 1 TABLET NIGHTLY Yes Divya Vargas MD   amLODIPine (NORVASC) 10 MG tablet Take 1 tablet by mouth daily Yes Rodrigue Andrews MD   triamcinolone (NASACORT ALLERGY 24HR) 55 MCG/ACT nasal inhaler 2 sprays by Each Nostril route daily As needed Yes Historical Provider, MD buPROPion (WELLBUTRIN SR) 200 MG extended release tablet Take 1 tablet by mouth 2 times daily TAKE 1 TABLET TWICE A DAY Yes CARLOS MANUEL Chung CNP   hydrOXYzine (VISTARIL) 25 MG capsule Take 1 capsule by mouth 3 times daily as needed for Anxiety Yes CARLOS MANUEL Chung CNP   chlordiazePOXIDE-clidinium (LIBRAX) 5-2.5 MG per capsule Take 1 capsule by mouth 2 times daily. TAKE 1 CAPSULE TWICE A DAY AS NEEDED FOR PAIN Yes Genoveva Snowden MD   Polyethylene Glycol 3350 (MIRALAX PO) Take by mouth Yes Historical Provider, MD   cetirizine (ZYRTEC ALLERGY) 10 MG tablet Take 1 tablet by mouth daily Yes Artist CARLOS MANUEL Carnes CNP   Tetrahydrozoline-Zn Sulfate (EYE DROPS ALLERGY RELIEF OP) Apply 1 drop to eye daily Yes Historical Provider, MD   Probiotic Product (PROBIOTIC-10) CHEW Take by mouth daily Yes Historical Provider, MD   linaclotide (LINZESS) 145 MCG capsule Take 145 mcg by mouth every morning (before breakfast) Yes Historical Provider, MD   MAGNESIUM CITRATE PO Take by mouth as needed (weekly) Yes Historical Provider, MD   denosumab (PROLIA) 60 MG/ML SOLN SC injection Inject 60 mg into the skin once Yes Historical Provider, MD   folic acid (FOLVITE) 251 MCG tablet Take 400 mcg by mouth daily Yes Historical Provider, MD   b complex vitamins capsule Take 1 capsule by mouth daily Yes Shari Valenzuela MD   FISH OIL 1,000 mg by Does not apply route 2 times daily Indications: Decreased Energy  Yes Historical Provider, MD   aspirin 81 MG tablet Take 81 mg by mouth daily.    Yes Historical Provider, MD       Past Medical History:   Diagnosis Date    Allergic rhinitis     Anxiety     CVA (cerebral infarction)     Depression     Fibromyalgia     Headache(784.0)     Hyperlipidemia     Hypertension     Irritable bowel syndrome     Kidney failure     Unspecified cerebral artery occlusion with cerebral infarction     Unspecified sleep apnea        Past Surgical History:   Procedure Laterality Date    CARPAL TUNNEL RELEASE Right     [facility]    ENDOSCOPY, COLON, DIAGNOSTIC  unsure    EYE SURGERY      lasik    HEMORRHOID SURGERY  unsure    HYSTERECTOMY      total    NECK SURGERY  18  years ago    fusion    SINUS SURGERY  10 years ago    TUBAL LIGATION         Family History   Problem Relation Age of Onset    Diabetes Mother     High Blood Pressure Mother     Heart Disease Mother     Heart Disease Father     Parkinsonism Father     Neurofibromatosis Father     Depression Father     Alcohol Abuse Father     Neurofibromatosis Sister     Neurofibromatosis Brother     Other Brother         multiple sclerosis    Dementia Brother     Diabetes Sister     High Blood Pressure Sister     Depression Sister     Diabetes Brother        CareTeam (Including outside providers/suppliers regularly involved in providing care):   Patient Care Team:  Kamilla Andre MD as PCP - General (Family Medicine)  Kamilla Andre MD as PCP - [facility] Empaneled Provider  Dulce Harley, PhD (Psychology)    Wt Readings from Last 3 Encounters:   01/21/21 127 lb (57.6 kg)   01/07/21 131 lb 6.4 oz (59.6 kg)   01/07/21 131 lb (59.4 kg)     Vitals:    01/21/21 1057   BP: 130/86   Site: Left Upper Arm   Position: Sitting   Cuff Size: Large Adult   Pulse: 63   SpO2: 99%   Weight: 127 lb (57.6 kg)   Height: 5' (1.524 m)     Body mass index is 24.8 kg/m². Based upon direct observation of the patient, evaluation of cognition reveals recent and remote memory intact. Patient's complete Health Risk Assessment and screening values have been reviewed and are found in Flowsheets. The following problems were reviewed today and where indicated follow up appointments were made and/or referrals ordered.     Positive Risk Factor Screenings with Interventions:          General Health and ACP:  General  In general, how would you say your health is?: (!) Poor  In the past 7 days, have you experienced any of the following? New or Increased Pain, New or Increased Fatigue, Loneliness, Social Isolation, Stress or Anger?: None of These  Do you get the social and emotional support that you need?: Yes  Do you have a Living Will?: Yes  Advance Directives     Power of Ruben Benjamin Will ACP-Advance Directive ACP-Power of     Not on File Not on File Not on File Not on File      General Health Risk Interventions:  · Poor self-assessment of health status: patient declines any further evaluation/treatment for this issue    Health Habits/Nutrition:  Health Habits/Nutrition  Do you exercise for at least 20 minutes 2-3 times per week?: (!) No  Have you lost any weight without trying in the past 3 months?: No  Do you eat fewer than 2 meals per day?: No  Have you seen a dentist within the past year?: (!) No  Body mass index: 24.8  Health Habits/Nutrition Interventions:  · A healthy diet and routine physical activity encouraged      ADL:  ADLs  In the past 7 days, did you need help from others to perform any of the following everyday activities? Eating, dressing, grooming, bathing, toileting, or walking/balance?: (!) Walking/Balance  In the past 7 days, did you need help from others to take care of any of the following?  Laundry, housekeeping, banking/finances, shopping, telephone use, food preparation, transportation, or taking medications?: None  ADL Interventions:  · Patient declines any further evaluation/treatment for this issue    Personalized Preventive Plan   Current Health Maintenance Status  Immunization History   Administered Date(s) Administered    Influenza, Quadv, 6 mo and older, IM (Fluzone, Flulaval) 10/02/2018    Influenza, Quadv, IM, (6 mo and older Fluzone, Flulaval, Fluarix and 3 yrs and older Afluria) 12/28/2016, 11/17/2017    Influenza, Quadv, adjuvanted, 65 yrs +, IM, PF (Fluad) 10/20/2020    Influenza, Triv, inactivated, subunit, adjuvanted, IM (Fluad 65 yrs and older) 11/20/2019    Pneumococcal Conjugate 13-valent ([id number]) 01/15/2019    Tdap (Boostrix, Adacel) 08/12/2018        Health Maintenance   Topic Date Due    Hepatitis C screen  1955    Shingles Vaccine (1 of 2) 10/11/2003    Annual Wellness Visit (AWV)  05/29/2019    Lipid screen  01/15/2020    Pneumococcal 65+ yrs at Risk Vaccine (2 of 2 - PPSV23) 01/15/2020    Breast cancer screen  01/17/2021    Potassium monitoring  01/05/2022    Creatinine monitoring  01/05/2022    Colon cancer screen colonoscopy  05/11/2028    DTaP/Tdap/Td vaccine (2 - Td) 08/12/2028    DEXA (modify frequency per FRAX score)  Completed    Flu vaccine  Completed    Hepatitis A vaccine  Aged Out    Hepatitis B vaccine  Aged Out    Hib vaccine  Aged Out    Meningococcal (ACWY) vaccine  Aged Out     Recommendations for [place] Due: see orders and patient instructions/AVS.    Recommended screening schedule for the next 5-10 years is provided to the patient in written form: see Patient Maximino Abebe was seen today for medicare awv. Diagnoses and all orders for this visit:    Encounter for Medicare annual wellness exam        -     Routine health maintenance screening was reviewed as above. Electronically signed by Sindy Cerrato MD on 1/21/21.

## 2021-01-21 ENCOUNTER — OFFICE VISIT (OUTPATIENT)
Dept: FAMILY MEDICINE CLINIC | Age: 66
End: 2021-01-21
Payer: MEDICARE

## 2021-01-21 ENCOUNTER — HOSPITAL ENCOUNTER (OUTPATIENT)
Age: 66
Discharge: HOME | End: 2021-01-21
Payer: MEDICARE

## 2021-01-21 VITALS
OXYGEN SATURATION: 99 % | SYSTOLIC BLOOD PRESSURE: 130 MMHG | BODY MASS INDEX: 24.94 KG/M2 | DIASTOLIC BLOOD PRESSURE: 86 MMHG | WEIGHT: 127 LBS | HEIGHT: 60 IN | HEART RATE: 63 BPM

## 2021-01-21 DIAGNOSIS — I63.00 CEREBRAL INFARCTION DUE TO THROMBOSIS OF PRECEREBRAL ARTERY (HCC): Chronic | ICD-10-CM

## 2021-01-21 DIAGNOSIS — E78.00 PURE HYPERCHOLESTEROLEMIA: ICD-10-CM

## 2021-01-21 DIAGNOSIS — F51.04 PSYCHOPHYSIOLOGICAL INSOMNIA: ICD-10-CM

## 2021-01-21 DIAGNOSIS — F41.1 GENERALIZED ANXIETY DISORDER: ICD-10-CM

## 2021-01-21 DIAGNOSIS — Z00.00 ENCOUNTER FOR MEDICARE ANNUAL WELLNESS EXAM: Primary | ICD-10-CM

## 2021-01-21 DIAGNOSIS — R42 DIZZINESS: ICD-10-CM

## 2021-01-21 DIAGNOSIS — F33.1 MODERATE EPISODE OF RECURRENT MAJOR DEPRESSIVE DISORDER (HCC): ICD-10-CM

## 2021-01-21 DIAGNOSIS — E55.9 VITAMIN D DEFICIENCY: ICD-10-CM

## 2021-01-21 PROBLEM — N18.4 CKD (CHRONIC KIDNEY DISEASE), STAGE IV (HCC): Status: RESOLVED | Noted: 2018-01-18 | Resolved: 2021-01-21

## 2021-01-21 LAB
BASOPHILS # BLD: 1 %
BASOPHILS ABSOLUTE: 0.1 THOU/MM3 (ref 0–0.1)
EKG ATRIAL RATE: 64 BPM
EKG P AXIS: 26 DEGREES
EKG P-R INTERVAL: 148 MS
EKG Q-T INTERVAL: 416 MS
EKG QRS DURATION: 106 MS
EKG QTC CALCULATION (BAZETT): 429 MS
EKG R AXIS: 75 DEGREES
EKG T AXIS: 69 DEGREES
EKG VENTRICULAR RATE: 64 BPM
EOSINOPHIL # BLD: 1.7 %
EOSINOPHILS ABSOLUTE: 0.1 THOU/MM3 (ref 0–0.4)
ERYTHROCYTE [DISTWIDTH] IN BLOOD BY AUTOMATED COUNT: 12.9 % (ref 11.5–14.5)
ERYTHROCYTE [DISTWIDTH] IN BLOOD BY AUTOMATED COUNT: 46.8 FL (ref 35–45)
HCT VFR BLD CALC: 37.2 % (ref 37–47)
HEMOGLOBIN: 11.5 GM/DL (ref 12–16)
IMMATURE GRANS (ABS): 0.04 THOU/MM3 (ref 0–0.07)
IMMATURE GRANULOCYTES: 0.5 %
LYMPHOCYTES # BLD: 24.5 %
LYMPHOCYTES ABSOLUTE: 2.1 THOU/MM3 (ref 1–4.8)
MCH RBC QN AUTO: 30.9 PG (ref 26–33)
MCHC RBC AUTO-ENTMCNC: 30.9 GM/DL (ref 32.2–35.5)
MCV RBC AUTO: 100 FL (ref 81–99)
MONOCYTES # BLD: 5.7 %
MONOCYTES ABSOLUTE: 0.5 THOU/MM3 (ref 0.4–1.3)
NUCLEATED RED BLOOD CELLS: 0 /100 WBC
PLATELET # BLD: 286 THOU/MM3 (ref 130–400)
PMV BLD AUTO: 11.2 FL (ref 9.4–12.4)
RBC # BLD: 3.72 MILL/MM3 (ref 4.2–5.4)
SEG NEUTROPHILS: 66.6 %
SEGMENTED NEUTROPHILS ABSOLUTE COUNT: 5.8 THOU/MM3 (ref 1.8–7.7)
WBC # BLD: 8.7 THOU/MM3 (ref 4.8–10.8)

## 2021-01-21 PROCEDURE — 93005 ELECTROCARDIOGRAM TRACING: CPT | Performed by: NURSE PRACTITIONER

## 2021-01-21 PROCEDURE — 4040F PNEUMOC VAC/ADMIN/RCVD: CPT | Performed by: FAMILY MEDICINE

## 2021-01-21 PROCEDURE — 80061 LIPID PANEL: CPT

## 2021-01-21 PROCEDURE — 36415 COLL VENOUS BLD VENIPUNCTURE: CPT

## 2021-01-21 PROCEDURE — G8484 FLU IMMUNIZE NO ADMIN: HCPCS | Performed by: FAMILY MEDICINE

## 2021-01-21 PROCEDURE — G0439 PPPS, SUBSEQ VISIT: HCPCS | Performed by: FAMILY MEDICINE

## 2021-01-21 PROCEDURE — 82607 VITAMIN B-12: CPT

## 2021-01-21 PROCEDURE — 84443 ASSAY THYROID STIM HORMONE: CPT

## 2021-01-21 PROCEDURE — 82746 ASSAY OF FOLIC ACID SERUM: CPT

## 2021-01-21 PROCEDURE — 80048 BASIC METABOLIC PNL TOTAL CA: CPT

## 2021-01-21 PROCEDURE — 84439 ASSAY OF FREE THYROXINE: CPT

## 2021-01-21 PROCEDURE — 99213 OFFICE O/P EST LOW 20 MIN: CPT | Performed by: FAMILY MEDICINE

## 2021-01-21 PROCEDURE — 1123F ACP DISCUSS/DSCN MKR DOCD: CPT | Performed by: FAMILY MEDICINE

## 2021-01-21 PROCEDURE — 3017F COLORECTAL CA SCREEN DOC REV: CPT | Performed by: FAMILY MEDICINE

## 2021-01-21 PROCEDURE — 85025 COMPLETE CBC W/AUTO DIFF WBC: CPT

## 2021-01-21 RX ORDER — SIMVASTATIN 20 MG
TABLET ORAL
Qty: 90 TABLET | Refills: 1 | Status: SHIPPED | OUTPATIENT
Start: 2021-01-21 | End: 2021-09-03

## 2021-01-21 RX ORDER — CLOPIDOGREL BISULFATE 75 MG/1
TABLET ORAL
Qty: 90 TABLET | Refills: 1 | Status: SHIPPED | OUTPATIENT
Start: 2021-01-21 | End: 2021-08-24

## 2021-01-21 RX ORDER — CALCIUM CARBONATE/VITAMIN D3 500MG-5MCG
2 TABLET ORAL 2 TIMES DAILY
Qty: 360 TABLET | Refills: 1 | Status: SHIPPED | OUTPATIENT
Start: 2021-01-21 | End: 2021-02-11

## 2021-01-21 RX ORDER — ERGOCALCIFEROL 1.25 MG/1
50000 CAPSULE ORAL WEEKLY
Qty: 12 CAPSULE | Refills: 1 | Status: SHIPPED | OUTPATIENT
Start: 2021-01-21 | End: 2021-02-11

## 2021-01-21 RX ORDER — SIMETHICONE 80 MG
80 TABLET,CHEWABLE ORAL 2 TIMES DAILY
COMMUNITY

## 2021-01-21 RX ORDER — PANTOPRAZOLE SODIUM 40 MG/1
40 TABLET, DELAYED RELEASE ORAL DAILY
Qty: 90 TABLET | Refills: 1 | Status: SHIPPED | OUTPATIENT
Start: 2021-01-21 | End: 2022-01-12 | Stop reason: SINTOL

## 2021-01-21 RX ORDER — TRAZODONE HYDROCHLORIDE 50 MG/1
TABLET ORAL
Qty: 90 TABLET | Refills: 1 | Status: SHIPPED | OUTPATIENT
Start: 2021-01-21 | End: 2021-04-16 | Stop reason: SDUPTHER

## 2021-01-21 ASSESSMENT — ENCOUNTER SYMPTOMS: SINUS PRESSURE: 0

## 2021-01-21 ASSESSMENT — PATIENT HEALTH QUESTIONNAIRE - PHQ9
SUM OF ALL RESPONSES TO PHQ QUESTIONS 1-9: 0
1. LITTLE INTEREST OR PLEASURE IN DOING THINGS: 0
SUM OF ALL RESPONSES TO PHQ9 QUESTIONS 1 & 2: 0
SUM OF ALL RESPONSES TO PHQ QUESTIONS 1-9: 0
SUM OF ALL RESPONSES TO PHQ QUESTIONS 1-9: 0

## 2021-01-21 ASSESSMENT — LIFESTYLE VARIABLES: HOW OFTEN DO YOU HAVE A DRINK CONTAINING ALCOHOL: 0

## 2021-01-21 NOTE — PATIENT INSTRUCTIONS
Personalized Preventive Plan for Dwight Miller - 1/21/2021  Medicare offers a range of preventive health benefits. Some of the tests and screenings are paid in full while other may be subject to a deductible, co-insurance, and/or copay. Some of these benefits include a comprehensive review of your medical history including lifestyle, illnesses that may run in your family, and various assessments and screenings as appropriate. After reviewing your medical record and screening and assessments performed today your provider may have ordered immunizations, labs, imaging, and/or referrals for you. A list of these orders (if applicable) as well as your Preventive Care list are included within your After Visit Summary for your review. Other Preventive Recommendations:    · A preventive eye exam performed by an eye specialist is recommended every 1-2 years to screen for glaucoma; cataracts, macular degeneration, and other eye disorders. · A preventive dental visit is recommended every 6 months. · Try to get at least 150 minutes of exercise per week or 10,000 steps per day on a pedometer . · Order or download the FREE \"Exercise & Physical Activity: Your Everyday Guide\" from [employer]. Call 6-561.503.5745 or search [employer] online. · You need 5001-7033 mg of calcium and 9771-0487 IU of vitamin D per day. It is possible to meet your calcium requirement with diet alone, but a vitamin D supplement is usually necessary to meet this goal.  · When exposed to the sun, use a sunscreen that protects against both UVA and UVB radiation with an SPF of 30 or greater. Reapply every 2 to 3 hours or after sweating, drying off with a towel, or swimming. · Always wear a seat belt when traveling in a car. Always wear a helmet when riding a bicycle or motorcycle.

## 2021-01-22 ENCOUNTER — TELEPHONE (OUTPATIENT)
Dept: FAMILY MEDICINE CLINIC | Age: 66
End: 2021-01-22

## 2021-01-22 LAB
ANION GAP SERPL CALCULATED.3IONS-SCNC: 10 MEQ/L (ref 8–16)
BUN BLDV-MCNC: 21 MG/DL (ref 7–22)
CALCIUM SERPL-MCNC: 10.2 MG/DL (ref 8.5–10.5)
CHLORIDE BLD-SCNC: 100 MEQ/L (ref 98–111)
CHOLESTEROL, TOTAL: 174 MG/DL (ref 100–199)
CO2: 29 MEQ/L (ref 23–33)
CREAT SERPL-MCNC: 2.1 MG/DL (ref 0.4–1.2)
FOLATE: > 20 NG/ML (ref 4.8–24.2)
GFR SERPL CREATININE-BSD FRML MDRD: 23 ML/MIN/1.73M2
GLUCOSE BLD-MCNC: 80 MG/DL (ref 70–108)
HDLC SERPL-MCNC: 104 MG/DL
LDL CHOLESTEROL CALCULATED: 58 MG/DL
POTASSIUM SERPL-SCNC: 3.7 MEQ/L (ref 3.5–5.2)
SODIUM BLD-SCNC: 139 MEQ/L (ref 135–145)
T4 FREE: 1.43 NG/DL (ref 0.93–1.76)
TRIGL SERPL-MCNC: 59 MG/DL (ref 0–199)
TSH SERPL DL<=0.05 MIU/L-ACNC: 2.78 UIU/ML (ref 0.4–4.2)
VITAMIN B-12: 1227 PG/ML (ref 211–911)

## 2021-01-22 NOTE — TELEPHONE ENCOUNTER
----- Message from Janine Johnson MD sent at 1/22/2021  8:51 AM EST -----  Kidney function is decreased, likely from dehydration. Push the water intake, at least 8 glasses/day and recheck BMP in 1 week.

## 2021-01-22 NOTE — TELEPHONE ENCOUNTER
Spoke with the patient regarding the labs done  Patient agreeable to the recheck blood work in 1 week

## 2021-01-26 ENCOUNTER — TELEPHONE (OUTPATIENT)
Dept: FAMILY MEDICINE CLINIC | Age: 66
End: 2021-01-26

## 2021-01-26 NOTE — TELEPHONE ENCOUNTER
Edward Jauregui wants to talk to someone regarding her B12 level. Was told to call in because its too high?  [phone]

## 2021-01-29 ENCOUNTER — TELEPHONE (OUTPATIENT)
Dept: FAMILY MEDICINE CLINIC | Age: 66
End: 2021-01-29

## 2021-01-29 ENCOUNTER — HOSPITAL ENCOUNTER (OUTPATIENT)
Age: 66
Discharge: HOME | End: 2021-01-29
Payer: MEDICARE

## 2021-01-29 ENCOUNTER — OFFICE VISIT (OUTPATIENT)
Dept: CARDIOLOGY CLINIC | Age: 66
End: 2021-01-29
Payer: MEDICARE

## 2021-01-29 VITALS
HEART RATE: 80 BPM | WEIGHT: 130 LBS | BODY MASS INDEX: 25.52 KG/M2 | SYSTOLIC BLOOD PRESSURE: 126 MMHG | HEIGHT: 60 IN | DIASTOLIC BLOOD PRESSURE: 72 MMHG

## 2021-01-29 DIAGNOSIS — E86.0 DEHYDRATION: Primary | ICD-10-CM

## 2021-01-29 DIAGNOSIS — R94.31 ABNORMAL EKG: ICD-10-CM

## 2021-01-29 DIAGNOSIS — R42 POSTURAL DIZZINESS: Primary | ICD-10-CM

## 2021-01-29 DIAGNOSIS — R07.89 CHEST PAIN, ATYPICAL: ICD-10-CM

## 2021-01-29 DIAGNOSIS — E86.0 DEHYDRATION: ICD-10-CM

## 2021-01-29 DIAGNOSIS — E78.00 PURE HYPERCHOLESTEROLEMIA: ICD-10-CM

## 2021-01-29 DIAGNOSIS — I10 ESSENTIAL HYPERTENSION: Chronic | ICD-10-CM

## 2021-01-29 LAB
ANION GAP SERPL CALCULATED.3IONS-SCNC: 8 MEQ/L (ref 8–16)
BUN BLDV-MCNC: 28 MG/DL (ref 7–22)
CALCIUM SERPL-MCNC: 10.5 MG/DL (ref 8.5–10.5)
CHLORIDE BLD-SCNC: 102 MEQ/L (ref 98–111)
CO2: 33 MEQ/L (ref 23–33)
CREAT SERPL-MCNC: 2 MG/DL (ref 0.4–1.2)
GFR SERPL CREATININE-BSD FRML MDRD: 25 ML/MIN/1.73M2
GLUCOSE BLD-MCNC: 84 MG/DL (ref 70–108)
POTASSIUM SERPL-SCNC: 4.1 MEQ/L (ref 3.5–5.2)
SODIUM BLD-SCNC: 143 MEQ/L (ref 135–145)

## 2021-01-29 PROCEDURE — 1036F TOBACCO NON-USER: CPT | Performed by: INTERNAL MEDICINE

## 2021-01-29 PROCEDURE — G8484 FLU IMMUNIZE NO ADMIN: HCPCS | Performed by: INTERNAL MEDICINE

## 2021-01-29 PROCEDURE — 80048 BASIC METABOLIC PNL TOTAL CA: CPT

## 2021-01-29 PROCEDURE — 99213 OFFICE O/P EST LOW 20 MIN: CPT | Performed by: INTERNAL MEDICINE

## 2021-01-29 PROCEDURE — G8417 CALC BMI ABV UP PARAM F/U: HCPCS | Performed by: INTERNAL MEDICINE

## 2021-01-29 PROCEDURE — 1123F ACP DISCUSS/DSCN MKR DOCD: CPT | Performed by: INTERNAL MEDICINE

## 2021-01-29 PROCEDURE — 3017F COLORECTAL CA SCREEN DOC REV: CPT | Performed by: INTERNAL MEDICINE

## 2021-01-29 PROCEDURE — 36415 COLL VENOUS BLD VENIPUNCTURE: CPT

## 2021-01-29 PROCEDURE — G8399 PT W/DXA RESULTS DOCUMENT: HCPCS | Performed by: INTERNAL MEDICINE

## 2021-01-29 PROCEDURE — 4040F PNEUMOC VAC/ADMIN/RCVD: CPT | Performed by: INTERNAL MEDICINE

## 2021-01-29 PROCEDURE — 1090F PRES/ABSN URINE INCON ASSESS: CPT | Performed by: INTERNAL MEDICINE

## 2021-01-29 PROCEDURE — G8427 DOCREV CUR MEDS BY ELIG CLIN: HCPCS | Performed by: INTERNAL MEDICINE

## 2021-01-29 RX ORDER — AMLODIPINE BESYLATE 5 MG/1
5 TABLET ORAL DAILY
Qty: 90 TABLET | Refills: 3 | Status: SHIPPED | OUTPATIENT
Start: 2021-01-29 | End: 2022-01-06

## 2021-01-29 RX ORDER — AMLODIPINE BESYLATE 5 MG/1
5 TABLET ORAL DAILY
Qty: 30 TABLET | Refills: 5 | Status: SHIPPED | OUTPATIENT
Start: 2021-01-29 | End: 2021-01-29
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
## 2021-01-29 NOTE — TELEPHONE ENCOUNTER
Deloris calls regarding a Lab draw she was told to have in 1 week. Found notes regarding this and a BMP was ordered.

## 2021-02-01 ENCOUNTER — TELEPHONE (OUTPATIENT)
Dept: FAMILY MEDICINE CLINIC | Age: 66
End: 2021-02-01

## 2021-02-01 NOTE — TELEPHONE ENCOUNTER
----- Message from Santiago Wells MD sent at 1/31/2021  9:20 AM EST -----  Kidney function is decreased. Is she seeing nephrology?

## 2021-02-01 NOTE — TELEPHONE ENCOUNTER
----- Message from Guillermo Moralez MD sent at 1/31/2021  9:20 AM EST -----  Kidney function is decreased. Is she seeing nephrology?

## 2021-02-01 NOTE — TELEPHONE ENCOUNTER
spoke with the patient regarding the labs done  Patient states her Cardiologist is getting her into the Kidney specialist sooner than later  Patient to call the Cardiologist if she doesn't here from the Kidney specalist

## 2021-02-04 ENCOUNTER — OFFICE VISIT (OUTPATIENT)
Dept: PSYCHIATRY | Age: 66
End: 2021-02-04
Payer: MEDICARE

## 2021-02-04 DIAGNOSIS — F41.1 GENERALIZED ANXIETY DISORDER: Primary | ICD-10-CM

## 2021-02-04 DIAGNOSIS — R41.3 MEMORY LOSS: ICD-10-CM

## 2021-02-04 DIAGNOSIS — F33.1 MODERATE EPISODE OF RECURRENT MAJOR DEPRESSIVE DISORDER (HCC): ICD-10-CM

## 2021-02-04 PROCEDURE — G8417 CALC BMI ABV UP PARAM F/U: HCPCS | Performed by: NURSE PRACTITIONER

## 2021-02-04 PROCEDURE — G8484 FLU IMMUNIZE NO ADMIN: HCPCS | Performed by: NURSE PRACTITIONER

## 2021-02-04 PROCEDURE — G8399 PT W/DXA RESULTS DOCUMENT: HCPCS | Performed by: NURSE PRACTITIONER

## 2021-02-04 PROCEDURE — 1123F ACP DISCUSS/DSCN MKR DOCD: CPT | Performed by: NURSE PRACTITIONER

## 2021-02-04 PROCEDURE — 1090F PRES/ABSN URINE INCON ASSESS: CPT | Performed by: NURSE PRACTITIONER

## 2021-02-04 PROCEDURE — 1036F TOBACCO NON-USER: CPT | Performed by: NURSE PRACTITIONER

## 2021-02-04 PROCEDURE — 3017F COLORECTAL CA SCREEN DOC REV: CPT | Performed by: NURSE PRACTITIONER

## 2021-02-04 PROCEDURE — 99215 OFFICE O/P EST HI 40 MIN: CPT | Performed by: NURSE PRACTITIONER

## 2021-02-04 PROCEDURE — 4040F PNEUMOC VAC/ADMIN/RCVD: CPT | Performed by: NURSE PRACTITIONER

## 2021-02-04 PROCEDURE — G8427 DOCREV CUR MEDS BY ELIG CLIN: HCPCS | Performed by: NURSE PRACTITIONER
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
## 2021-02-08 ENCOUNTER — HOSPITAL ENCOUNTER (OUTPATIENT)
Dept: NURSING | Age: 66
Discharge: HOME | End: 2021-02-08
Payer: MEDICARE

## 2021-02-08 VITALS
TEMPERATURE: 97.7 F | RESPIRATION RATE: 18 BRPM | BODY MASS INDEX: 25.19 KG/M2 | WEIGHT: 129 LBS | HEART RATE: 83 BPM | OXYGEN SATURATION: 95 % | DIASTOLIC BLOOD PRESSURE: 63 MMHG | SYSTOLIC BLOOD PRESSURE: 127 MMHG

## 2021-02-08 DIAGNOSIS — M81.0 AGE-RELATED OSTEOPOROSIS WITHOUT CURRENT PATHOLOGICAL FRACTURE: Primary | ICD-10-CM

## 2021-02-08 PROCEDURE — 6360000002 HC RX W HCPCS: Performed by: NURSE PRACTITIONER

## 2021-02-08 PROCEDURE — 96372 THER/PROPH/DIAG INJ SC/IM: CPT

## 2021-02-08 RX ORDER — DIPHENHYDRAMINE HYDROCHLORIDE 50 MG/ML
50 INJECTION INTRAMUSCULAR; INTRAVENOUS ONCE
Status: CANCELLED | OUTPATIENT
Start: 2021-08-02 | End: 2021-08-02

## 2021-02-08 RX ORDER — SODIUM CHLORIDE 9 MG/ML
INJECTION, SOLUTION INTRAVENOUS CONTINUOUS
Status: CANCELLED | OUTPATIENT
Start: 2021-08-02

## 2021-02-08 RX ORDER — METHYLPREDNISOLONE SODIUM SUCCINATE 125 MG/2ML
125 INJECTION, POWDER, LYOPHILIZED, FOR SOLUTION INTRAMUSCULAR; INTRAVENOUS ONCE
Status: CANCELLED | OUTPATIENT
Start: 2021-08-02 | End: 2021-08-02

## 2021-02-08 RX ADMIN — DENOSUMAB 60 MG: 60 INJECTION SUBCUTANEOUS at 09:53

## 2021-02-08 ASSESSMENT — PAIN - FUNCTIONAL ASSESSMENT: PAIN_FUNCTIONAL_ASSESSMENT: 0-10

## 2021-02-08 NOTE — PROGRESS NOTES
0945 Patient admitted to room 11, vitals are stable. Patient offered rights and responsibilities. __m__ Safety:       (Environmental)  ? Bromide to environment  ? Ensure ID band is correct and in place/ allergy band as needed  ? Assess for fall risk  ? Initiate fall precautions as applicable (fall band, side rails, etc.)  ? Call light within reach  ? Bed in low position/ wheels locked    _m___ Pain:       ? Assess pain level and characteristics  ? Administer analgesics as ordered  ? Assess effectiveness of pain management and report to MD as needed    __m__ Knowledge Deficit:  ? Assess baseline knowledge  ? Provide teaching at level of understanding  ? Provide teaching via preferred learning method  ? Evaluate teaching effectiveness  m  ____ Hemodynamic/Respiratory Status:       (Pre and Post Procedure Monitoring)  ? Assess/Monitor vital signs and LOC  ? Assess Baseline SpO2 prior to any sedation  ? Obtain weight/height  ? Assess vital signs/ LOC until patient meets discharge criteria  ?  Monitor procedure site and notify MD of any issues

## 2021-02-11 ENCOUNTER — HOSPITAL ENCOUNTER (OUTPATIENT)
Age: 66
Discharge: HOME | End: 2021-02-11
Payer: MEDICARE

## 2021-02-11 ENCOUNTER — OFFICE VISIT (OUTPATIENT)
Dept: NEPHROLOGY | Age: 66
End: 2021-02-11
Payer: MEDICARE

## 2021-02-11 VITALS
WEIGHT: 130 LBS | SYSTOLIC BLOOD PRESSURE: 120 MMHG | DIASTOLIC BLOOD PRESSURE: 78 MMHG | HEART RATE: 77 BPM | TEMPERATURE: 97 F | BODY MASS INDEX: 25.39 KG/M2 | OXYGEN SATURATION: 99 %

## 2021-02-11 DIAGNOSIS — I10 ESSENTIAL HYPERTENSION: Primary | ICD-10-CM

## 2021-02-11 DIAGNOSIS — R39.9 URINARY TRACT INFECTION SYMPTOMS: ICD-10-CM

## 2021-02-11 DIAGNOSIS — N18.32 STAGE 3B CHRONIC KIDNEY DISEASE (HCC): ICD-10-CM

## 2021-02-11 DIAGNOSIS — N17.9 AKI (ACUTE KIDNEY INJURY) (HCC): ICD-10-CM

## 2021-02-11 PROCEDURE — 1090F PRES/ABSN URINE INCON ASSESS: CPT | Performed by: INTERNAL MEDICINE

## 2021-02-11 PROCEDURE — 99213 OFFICE O/P EST LOW 20 MIN: CPT | Performed by: INTERNAL MEDICINE

## 2021-02-11 PROCEDURE — 36415 COLL VENOUS BLD VENIPUNCTURE: CPT

## 2021-02-11 PROCEDURE — 1123F ACP DISCUSS/DSCN MKR DOCD: CPT | Performed by: INTERNAL MEDICINE

## 2021-02-11 PROCEDURE — G8427 DOCREV CUR MEDS BY ELIG CLIN: HCPCS | Performed by: INTERNAL MEDICINE

## 2021-02-11 PROCEDURE — G8399 PT W/DXA RESULTS DOCUMENT: HCPCS | Performed by: INTERNAL MEDICINE

## 2021-02-11 PROCEDURE — G8484 FLU IMMUNIZE NO ADMIN: HCPCS | Performed by: INTERNAL MEDICINE

## 2021-02-11 PROCEDURE — 1036F TOBACCO NON-USER: CPT | Performed by: INTERNAL MEDICINE

## 2021-02-11 PROCEDURE — G8417 CALC BMI ABV UP PARAM F/U: HCPCS | Performed by: INTERNAL MEDICINE

## 2021-02-11 PROCEDURE — 4040F PNEUMOC VAC/ADMIN/RCVD: CPT | Performed by: INTERNAL MEDICINE

## 2021-02-11 PROCEDURE — 80053 COMPREHEN METABOLIC PANEL: CPT

## 2021-02-11 PROCEDURE — 3017F COLORECTAL CA SCREEN DOC REV: CPT | Performed by: INTERNAL MEDICINE

## 2021-02-11 RX ORDER — CALCIUM CARBONATE/VITAMIN D3 500MG-5MCG
1 TABLET ORAL 2 TIMES DAILY
Qty: 360 TABLET | Refills: 1 | Status: SHIPPED | OUTPATIENT
Start: 2021-02-11 | End: 2022-02-15

## 2021-02-11 NOTE — PROGRESS NOTES
Latrell Boogie stated patient was dehydrated and encouraged 8 bottles of water.  Rechecked blood  Work 8 days later

## 2021-02-11 NOTE — PROGRESS NOTES
[facility]        Outpatient Follow-Up note         2/11/2021 11:18 AM    Patient Name:   Marietta Turner  YOB: 1955  Primary Care Physician:  Corinne Needy, MD      Chief Complaint / Reason for follow-up : Follow Up of CKD     Interval History :  Patient seen and examined by me. Feels well. No CP or SOB . Having some constipation and seen PCP and DR. Horacio Gaucher, had labs drawn which showed ERUM  So she was sent to us for evaluation .      Past History :  Past Medical History:   Diagnosis Date    Allergic rhinitis     Anxiety     CVA (cerebral infarction)     Depression     Fibromyalgia     Headache(784.0)     Hyperlipidemia     Hypertension     Irritable bowel syndrome     Kidney failure     Unspecified cerebral artery occlusion with cerebral infarction     Unspecified sleep apnea      Past Surgical History:   Procedure Laterality Date    CARPAL TUNNEL RELEASE Right     [facility]    ENDOSCOPY, COLON, DIAGNOSTIC  unsure    EYE SURGERY      lasik    HEMORRHOID SURGERY  unsure    HYSTERECTOMY      total    NECK SURGERY  18  years ago    fusion    SINUS SURGERY  10 years ago    TUBAL LIGATION          Medications :     Outpatient Medications Marked as Taking for the 2/11/21 encounter (Office Visit) with Tiffanie Borja MD   Medication Sig Dispense Refill    bisacodyl (DULCOLAX) 5 MG EC tablet Take 5 mg by mouth daily as needed for Constipation      amLODIPine (NORVASC) 5 MG tablet TAKE 1 TABLET BY MOUTH DAILY 90 tablet 3    simethicone (MI-ACID GAS RELIEF) 80 MG chewable tablet Take 80 mg by mouth 2 times daily      vitamin D (ERGOCALCIFEROL) 1.25 MG (98365 UT) CAPS capsule Take 1 capsule by mouth once a week 12 capsule 1    clopidogrel (PLAVIX) 75 MG tablet TAKE 1 TABLET DAILY 90 tablet 1    simvastatin (ZOCOR) 20 MG tablet TAKE 1 TABLET DAILY 90 tablet 1    pantoprazole (PROTONIX) 40 MG tablet Take 1 tablet by mouth daily (Patient taking differently: Take 40 mg by mouth as needed ) 90 tablet 1    calcium-cholecalciferol (OYSCO 500 + D) 500-200 MG-UNIT per tablet Take 2 tablets by mouth 2 times daily 360 tablet 1    traZODone (DESYREL) 50 MG tablet TAKE 1 TABLET NIGHTLY 90 tablet 1    triamcinolone (NASACORT ALLERGY 24HR) 55 MCG/ACT nasal inhaler 2 sprays by Each Nostril route daily As needed      buPROPion (WELLBUTRIN SR) 200 MG extended release tablet Take 1 tablet by mouth 2 times daily TAKE 1 TABLET TWICE A DAY 60 tablet 1    hydrOXYzine (VISTARIL) 25 MG capsule Take 1 capsule by mouth 3 times daily as needed for Anxiety 30 capsule 0    chlordiazePOXIDE-clidinium (LIBRAX) 5-2.5 MG per capsule Take 1 capsule by mouth 2 times daily. TAKE 1 CAPSULE TWICE A DAY AS NEEDED FOR PAIN 180 capsule 0    Polyethylene Glycol 3350 (MIRALAX PO) Take by mouth      cetirizine (ZYRTEC ALLERGY) 10 MG tablet Take 1 tablet by mouth daily 30 tablet 11    Tetrahydrozoline-Zn Sulfate (EYE DROPS ALLERGY RELIEF OP) Apply 1 drop to eye daily      Probiotic Product (PROBIOTIC-10) CHEW Take by mouth daily      linaclotide (LINZESS) 290 MCG CAPS capsule Take 290 mcg by mouth every morning (before breakfast)       MAGNESIUM CITRATE PO Take by mouth as needed (weekly)      denosumab (PROLIA) 60 MG/ML SOLN SC injection Inject 60 mg into the skin once      folic acid (FOLVITE) 317 MCG tablet Take 400 mcg by mouth daily      b complex vitamins capsule Take 1 capsule by mouth daily 100 capsule 3    FISH OIL 1,000 mg by Does not apply route 2 times daily Indications: Decreased Energy       aspirin 81 MG tablet Take 81 mg by mouth daily.            Vitals     /78 (Site: Left Upper Arm, Position: Sitting, Cuff Size: Medium Adult)   Pulse 77   Temp 97 °F (36.1 °C) (Temporal)   Wt 130 lb (59 kg)   SpO2 99%   BMI 25.39 kg/m²  Wt Readings from Last 3 Encounters:   02/11/21 130 lb (59 kg)   02/08/21 129 lb (58.5 kg)   01/29/21 130 lb (59 kg) Physical Exam     General -- no distress  Lungs -- clear  Heart -- S1, S2 heard, JVD - no  Abdomen - soft, non-tender  Extremities -- no edema  CNS - awake and alert    Labs, Radiology and Tests    Labs -      1/18 7/18 12/18 8/19 7/20 1/20         Potassium  4.8  5.1  4.2  4.1  4.3 4.0         BUN  30  34  25  29  26 26         Creatinine  1.6  1.8  1.6  1.8  1.7 1.6         eGFR  32  28  32  28  30 32                              UPCR  < 200    < 200  100 110  < 300         UMCR  13      6  13                                 12/18 - calcium 9.4 phosphorus 3.2 vitamin D 19 and PTH 83  3/9 - calcium - 9.4     Renal Ultrasound Scan -- 1/2018  Rt kidney 9 cm/ left kidney 9 cm. Hypoplastic kidneys . Elevated resistive indices . 5 mm cyst rt kidney      Other : old  lab data and PCP notes have been reviewed and noted patient's creatinine was around 1.5 in 2016 and in 2014 it was 0.7 A CT scan which was done in 2014 which was negative for any abnormalities of the kidney.     Assessment    1. Renal - as per MDRD patient's GFR is around 32 ML per minute minute. Recent worsening may be volume depletion   - check labs today, mostly might have gotten better . 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 2. Essential hypertension -running well. 3. Electrolytes - WNL  4. Diabetes mellitus - not on any meds   5. History of stroke in 2013   6. Severe osteoporosis- on prolia , take calcium 1 tab twice a day  7. Medications reviewed discussed with the patient and  in detail. 8. Follow-up in 6 months    Tests and orders placed this Encounter     Orders Placed This Encounter   Procedures   Jonathan Gonzáles M.D  [facility].

## 2021-02-12 ENCOUNTER — TELEPHONE (OUTPATIENT)
Dept: NEPHROLOGY | Age: 66
End: 2021-02-12

## 2021-02-12 LAB
ALBUMIN SERPL-MCNC: 4.2 G/DL (ref 3.5–5.1)
ALP BLD-CCNC: 69 U/L (ref 38–126)
ALT SERPL-CCNC: 18 U/L (ref 11–66)
ANION GAP SERPL CALCULATED.3IONS-SCNC: 9 MEQ/L (ref 8–16)
AST SERPL-CCNC: 21 U/L (ref 5–40)
BILIRUB SERPL-MCNC: 0.2 MG/DL (ref 0.3–1.2)
BUN BLDV-MCNC: 26 MG/DL (ref 7–22)
CALCIUM SERPL-MCNC: 9.3 MG/DL (ref 8.5–10.5)
CHLORIDE BLD-SCNC: 102 MEQ/L (ref 98–111)
CO2: 30 MEQ/L (ref 23–33)
CREAT SERPL-MCNC: 1.8 MG/DL (ref 0.4–1.2)
GFR SERPL CREATININE-BSD FRML MDRD: 28 ML/MIN/1.73M2
GLUCOSE BLD-MCNC: 75 MG/DL (ref 70–108)
POTASSIUM SERPL-SCNC: 4.3 MEQ/L (ref 3.5–5.2)
SODIUM BLD-SCNC: 141 MEQ/L (ref 135–145)
TOTAL PROTEIN: 6.9 G/DL (ref 6.1–8)

## 2021-02-12 NOTE — TELEPHONE ENCOUNTER
Please let the patient know that the kidney numbers are getting better it is down to 1.8 and a GFR of 28 mL/minute

## 2021-02-15 ENCOUNTER — OFFICE VISIT (OUTPATIENT)
Dept: PSYCHOLOGY | Age: 66
End: 2021-02-15
Payer: MEDICARE

## 2021-02-15 DIAGNOSIS — F33.1 MODERATE EPISODE OF RECURRENT MAJOR DEPRESSIVE DISORDER (HCC): Primary | ICD-10-CM

## 2021-02-15 PROCEDURE — 1036F TOBACCO NON-USER: CPT | Performed by: PSYCHOLOGIST

## 2021-02-15 PROCEDURE — 90834 PSYTX W PT 45 MINUTES: CPT | Performed by: PSYCHOLOGIST

## 2021-02-15 NOTE — PROGRESS NOTES
[facility] PSYCHOLOGY  [place] [address]  [address]  [address]  Dept: [phone]  Dept Fax: [phone]: [phone]    This note will not be viewable in [facility] for the following reason(s). This is a Psychotherapy Note. Patient: Tra Hill  Visit Date: 2/15/2021  Referring Provider:   No ref. provider found    SUBJECTIVE DATA     CHIEF COMPLAINT:    Chief Complaint   Patient presents with    Anxiety    Depression       Patient update: This session was conducted as a face-to-face meeting, per patient's request, with appropriate social distancing and both patient and psychologist wearing masks. Patient reports   · Went to see PEARL [place] and had a MOCA done, did badly (score of 12/30). Interestingly, her memory today was relativley intact, with patient able to relate numerous specific events from the news, accurately. · Recognizing that she needs to accept that Akira Hernandez won't change, and she is going to have to make the best of it  · Having a lot of gut pain and back pain  · Saw Dr. Chandrakant Moulton and learned she was really dehydrated  · Zaria Vela to see Dr. Mg Briones, who said only 20% of her colon is working. He told her to take Miralax and a stool softener  · Got the grab bar installed in the shower after our last appointment, feels that is safer  · Lots of back pain, feels better on a firmer mattress, so they are getting another one when they get their tax refund  · Working on setting boundaries on Akira Hernandez (who never wants what she is going to make--so she has been learning to just make something and if he doesn't like it, he can fix himself something)  · Has not been out much, due to Carmen.  Plans to get her 1st COVID shot this Thursday,   · Sleeping pretty well, with the trazodone  · Has been doing her [employer] for brain challenges, puzzles and games  · Has also been using her bright light therapy box daily  · Visited friend Pool Wolf yesterday, had a nice with some limits on your   · Let go of the anxiety and resentment about Rosario--negotiate a truce with the others in the family  · Focus on gratitudes    Homework assignment before next session:     [] Practice deep breathing 1-2 times per day for 15 minutes, as demonstrated in session, and/or:    [] Go to [employer]\" web site and begin practicing with their free meditation podcasts    [x] Track thoughts that you think feed anxiety or depression    [] Go to [employer] [facility] [employer]\" web site, open up the \"Workbooks\" tab, and select a problem from the list that best suits your needs. Review the first module. [x] Begin to track physical activity. Even five minutes per day will be helpful.    [] Talk with your physician about whether it's OK to start fish oil (burpless) or flax oil, 1000 to 1200 mg per day    [] Most importantly, remember this guideline: \"Don't believe everything you think! \"   :)    [] Try \"Expressive Writing\" using the guidelines on the handout    [x] Start yoga class, as discussed. 1. Continue work at [employer]. Let the trainers tailor a program to your needs (once feasible)  2. Continue enjoying time with friends on a regular basis. 3. Assert yourself when it comes to Seema Wilson and the finances. 4. Practice daily relaxation training, again. 5. Start journaling  6. Re-start yoga for multiple physical issues  7. Continue to focus on improving quality of life, for now  8. Keep your phone with you at all times, so as to be able to call for help if you fall. 9. Regular social time  10. Recognize your strengths, which include facilitating connections between other people. 11. Restart bright light therapy once the weather gets gloomy  12. Continue depression support group attendance  15. Do more walking and social events  14. Reframe cognitions about marriage--how to make things work  15. Spend more time with the friends who make you laugh  12. Continue to use the card I gave you, with the coping strategies. · Continue with better assertion re: health needs--feel free to postpone your procedure if you are more comfortable with that. · Also set limits with friends when going shopping  · Continue with self-care and \"Serenity Prayer\" for letting go of things you cannot control or fix. · Let go of resentment at [facility] from the past  · Continue going to depression support group once it resumes  · Keep setting boundaries, as discussed    See items under \"Plan,\" above. Session lasted 49 minutes.     Provider Signature:  Electronically signed by Colten Pepper, PhD on 2/15/2021 at 10:59 AM

## 2021-02-19 NOTE — PROGRESS NOTES
Patient Education     High Blood Pressure, To Be Confirmed, No Treatment   Your blood pressure today was higher than normal. Sometimes anxiety, pain, or other issues can cause a short-term rise in blood pressure. It later returns to normal. Blood pressure that is high only one time doesn t mean that you have high blood pressure (hypertension). High blood pressure is a long-term (chronic) illness. But you should have your blood pressure measured again in the next few days to find out if it s still high.     Blood pressure measurements are given as 2 numbers. Systolic blood pressure is the upper number. This is the pressure when the heart contracts. Diastolic blood pressure is the lower number. This is the pressure when the heart relaxes between beats. You will see your blood pressure readings written together. For example, a person with a systolic pressure of 118 and a diastolic pressure of 78 will have 118/78 written in the medical record.   Blood pressure is classified as normal, raised (elevated), or stage 1 or stage 2 high blood pressure:     Normal blood pressure. Systolic of less than 120 and diastolic of less than 80 (120/80).    Elevated blood pressure. Systolic of 120 to 129 and diastolic less than 80.    Stage 1 high blood pressure. Systolic is 130 to 139 or diastolic between 80 to 89.    Stage 2 high blood pressure. Systolic is 140 or higher or the diastolic is 90 or higher.  Lifestyle changes can help manage your blood pressure. These include weight loss, exercise, and quitting smoking. Have your blood pressure checked regularly to be sure it is under control.   Home care  To track your blood pressure, your healthcare provider may ask you to come into the office at different times and on different days. If your provider asks you to check your readings at home, ask him or her what times of the day to test and for how many days. Before you leave the office, ask your provider to show you how to take your  [place]     Time In: 0800  Time Out: 0830  Minutes: 30  Timed Code Treatment Minutes: 30 Minutes    Date: 2019  Patient Name: Chris Gómez,  Gender:  female        CSN: [id number]   : 1955  (70 y.o.)       Referring Practitioner: Barbara Manning CNP      Diagnosis: Age-related osteoporosis without current pathological fracture M81.0  Treatment Diagnosis: osteoporosis affecting body mechanics and posture   Additional Pertinent Hx: PMH: HTN, kidney stones, anxiety, memory issueus, fibromyalgia, osteoporosis, 2 large stroke 6 years ago. General:  PT Visit Information  Onset Date: 19  PT Insurance Information: Medicare- unlimited visits based on medical necessity and cap, aquatics and modalities covered except ionto, HP/CP  Total # of Visits to Date: 8  Plan of Care/Certification Expiration Date: 19  Canceled Appointment: 1  Progress Note Counter: 8/10 for PN. Subjective:  Chart Reviewed: Yes  Family / Caregiver Present: No  Comments: Follow up with [facility] [address] [place] [address] in July. Follow up with Dr. Steve Wray for right shoulder pain 19. Other (Comment): Referral for bone fragility program.      Subjective: Pt arrived 15 min late to appointment. Pt presents with rollator today and reports she has been working on staying close to walker and walking tall. Pt c/o ankle pain, possibly from walking differently than she is used to.       Pain:  Patient Currently in Pain: Yes  Pain Assessment: 0-10  Pain Level: 5  Pain Location: Ankle  Pain Orientation: Right, Left      Objective         Exercises  Exercise 4: Sit-stand transfer from foam with hands on thighs x5, CGA  Exercise 5: Nustep S6, A7 L1 x5 min  Exercise 7: Standing on foam: heel/toe raises, slow march, 3 way hip and mini squats x 10 -1 UE support  Exercise 8: Rocker Board 2 directions x 15, balance 30 seconds SBA during balance blood pressure. Ask questions if you don't understand something.   Using a home blood pressure monitor  Think about buying an automatic blood pressure monitor. Ask your provider for a recommendation as well as the correct size cuff to fit your arm. You can buy blood pressure monitors at most pharmacies.   The American Heart Association advises the following guidelines for home blood pressure monitoring:     Don't smoke or drink coffee or other caffeinated drinks for 30 minutes before taking your blood pressure.    Go to the bathroom before the test.    Relax for 5 minutes before taking the measurement.    Sit with your back supported (don't sit on a couch or soft chair). Keep your feet on the floor uncrossed. Place your arm on a solid flat surface (like a table) with the upper part of the arm at heart level. Place the middle of the cuff directly above the bend of the elbow. Check the monitor's instruction manual for an illustration.    Take multiple readings. When you measure, take 2 to 3 readings one minute apart. Record all of the results.    Take your blood pressure at the same time every day, or as your provider advises.    Record the date, time, and blood pressure reading.    Take the record with you to your next medical appointment. If your blood pressure monitor has a built-in memory, simply take the monitor with you to your next appointment.    Call your provider if you have several high readings. Don't be frightened by a single high blood pressure reading. But if you get a few high readings, check in with your provider.  Follow-up care  Keep all of your follow-up appointments. If your blood pressure is more than 120 over 80 on 2 out of 3 days, you will need to follow up with your healthcare provider for more evaluation and treatment.   Don t put this off! High blood pressure can be treated. High blood pressure that s not treated raises your risk for heart attack, heart failure, kidney disease, and stroke.  with 1 UE support  Exercise 13: Standing B hamstring stretch and calf stretch at step 3x15 sec hold  Exercise 15: Toe taps onto mushrooms x10, CGA, no UEs         Activity Tolerance:  Activity Tolerance: Patient Tolerated treatment well    Assessment: Body structures, Functions, Activity limitations: Decreased functional mobility , Decreased ADL status, Decreased strength, Decreased balance  Assessment: Continued with balance training, with pt completing standing exercises on foam with only 1 UE, and added sit-stand transfers and toe taps without UE support, but CGA. Pt requires cues for heel strike with gait but posture improved. Initiated standing hamstring stretch at step- tightness noted as pt walks with slight knee flexion B. Cues for posture required during exercises. Prognosis: Good       Patient Education:  Patient Education: Continue with HEP. Keep working on gait pattern. Plan:  Times per week: 2x  Plan weeks: 8 weeks  Current Treatment Recommendations: Strengthening, Balance Training, Gait Training, Manual Therapy - Soft Tissue Mobilization, Home Exercise Program, Patient/Caregiver Education & Training, Modalities, Functional Mobility Training, Transfer Training, ADL/Self-care Training    Goals:  Patient goals : Return to exercising at [address] [facility] term goals  Time Frame for Short term goals: 4 weeks  Short term goal 1: Pt will demonstrate knowledge of ADL guidelines to decrease stress at spine by observation of proper transfers, bed mobility and lifting techniques. Short term goal 2: Pt will demonstate knowledge of exercise guidelines and positions to avoid to decrease stress on spine. Short term goal 3: Pt will perform B step stance and tandem balance x10 sec with minimal sway to improve dynamic balance for gait on various terrain and decreased fall risk.    Short term goal 4: Pt will demo good postural awareness with <2 verbal cues during therapy session to carry over to functional   Call 911  Call 911 if you have any of these:     Blood pressure of 180/120 or higher    Chest pain or shortness of breath    Weakness of an arm or leg or one side of the face    Problems speaking or seeing  When to get medical advice  Call your healthcare provider right away if any of these occur:     Severe headache    Throbbing or rushing sound in the ears    Nosebleed    Sudden severe pain in your belly (abdomen)    Extreme drowsiness, confusion, or fainting    Dizziness or dizziness with spinning feeling (vertigo)  [employer] last reviewed this educational content on 7/1/2019 2000-2020 [employer]. [address] [place] [address]. All rights reserved. This information is not intended as a substitute for professional medical care. Always follow your healthcare professional's instructions.           Patient Education     What Is Atopic Dermatitis?  Atopic dermatitis (eczema) causes chronic skin irritation. This condition can affect people of all ages. It often runs in families (is genetic). It may also be linked to allergies such as hay fever and sometimes asthma. Patches of skin become dry, red, itchy, and scaly. In people with abnormally dry skin it's often called xerosis. Sometimes atopic dermatitis is only on the hands or feet. It often improves when the skin is well hydrated. It gets worse when the skin is dry. You can help control symptoms by practicing good self-care. Stay away from anything that causes flare-ups such as sunburn or vigorous scratching.   Where do you have symptoms?  Atopic dermatitis symptoms can appear anywhere on the body. But in most cases, they vary based on the person s age. In babies, irritation is often seen on the scalp, cheeks, chin, near the mouth, and under the eyelids. In children ages 2 through 10, skin folds such as the backs of the knees or in the arm crease are most often affected. In children 11 and older and in adults, symptoms can affect many  mobility and tasks. Long term goals  Time Frame for Long term goals : 8 weeks  Long term goal 1: Pt will be independent and compliant with HEP to achieve above goals.      [facility] [place], PT areas.   What triggers symptoms?  Symptoms flare because of many things. These include skin dryness, scratching, stress, harsh soaps, and irritants such as dust or wool. Try to stay away from anything that causes flare-ups.   Recognizing what causes flare-ups  To figure out what causes atopic dermatitis to flare, keep a list of things that seem to affect your skin. Start by filling in the spaces below. Then keep writing them down in a notebook or diary. The things that affect each person vary. So keep your own list and try to stay away from your triggers.     [email or web address] last reviewed this educational content on 8/1/2019 2000-2020 [employer]. [address] [place] [address], [place] [address]. All rights reserved. This information is not intended as a substitute for professional medical care. Always follow your healthcare professional's instructions.           Patient Education     Tips for Quitting Smoking (Cardiovascular)  Quitting smoking is a gift to yourself. It's one of the best things you can do to keep your heart disease from getting worse. Smoking reduces oxygen flow to your heart. It does this in 2 ways. It speeds the buildup of plaque along the artery walls. And it changes the health of your blood vessels. This raises your risk for heart attack, also known as acute myocardial infarction (AMI). Quitting helps reduce smoking's harmful effects. You may have tried to quit before, but don t give up. Try again. Many smokers try a few times before they succeed. It's never too early to benefit from quitting smoking. This is especially true if you already have ongoing (chronic) conditions such as high blood pressure and high cholesterol. These put you at higher risk for cardiovascular disease. Quitting smoking is a powerful way to reduce your risk for coronary disease.  Line up help      Ask for the support of your family and friends.    Join a smoking cessation class. Or ask your healthcare provider for a  referral to a psychologist who specializes in helping people quit smoking.     Ask your healthcare provider about nicotine replacement products. Also ask about prescription medicines that can help you quit. It may take some time to find a product and schedule that works for you.  Set a quit date    Choose a date in the next 2 to 4 weeks.    After picking a day, jose it in bold letters on a calendar.    Your quit list  Ideas to stop smoking include:  1. Start by giving up cigarettes at the times you least need them.  2. Keep some fruit close by at the times you are most likely to reach for a cigarette. For many people, smoking has an oral fixation component. This must be recognized and replaced by a healthy habit.  3. Use a nicotine replacement product instead of a cigarette.  Write down a few more ideas:  ______________________________________________________________________________  ______________________________________________________________________________  ______________________________________________________________________________  Set limits    Limit where you can smoke. Pick one room or a porch. Smoke only in that place.    Make smoking outdoors a house rule. Other smokers won t tempt you as much.    Talk with smokers around you about your intent to stop smoking. Then they can show consideration for you and limit their smoking around you.    Hang a list of  quit benefits  in the spot where you smoke. Put one on the refrigerator and one on your car dashboard.    For more information    National Cancer [place] Smoking Quitlinesmokefree.gov/tgjs-hz-zm-dkqgdf197-77H-IHVM (529-908-7377)    [employer] last reviewed this educational content on 11/1/2019 2000-2020 [employer]. [address] [place] [address], [place] [address]. All rights reserved. This information is not intended as a substitute for professional medical care. Always follow your healthcare professional's instructions.           Patient  Education     Planning to Quit Smoking  Your healthcare provider may have told you that you need to give up tobacco. Only you can decide if and when you are ready to quit. Quitting is hard to do. But the benefits will be worth it. When you decide to quit, come up with a plan that s right for you. Discuss your plan with your healthcare provider. And talk with your provider about medicines to help you quit.    Line up support  To quit smoking, you ll need a plan and some help. Pick a date in the next 2 to 4 weeks to quit. Use the time between now and that date to arrange for support.    Classes and counselors. Quit-smoking classes  people like you through the process. Get to know others in a class. And support each other beyond the class. Phone counseling also helps you keep on track. Ask your healthcare provider, local hospital, or public health department to put you in touch with a class and a phone counselor.    Family and friends. Tell your family and friends about your quit date. Ask them to support your change. If they smoke, only see them in smoke-free places. Don't allow smoking in your home and car.  Be careful with these products  Finding something to replace cigarettes may be hard to do. Some things may be as harmful as cigarettes. These include:    Smokeless (chewing) tobacco. This is just as harmful as regular tobacco. Tobacco should not be used as a substitute for cigarettes.    Herbal medicines or teas. These may affect how your body handles nicotine. Talk with your healthcare provider before using these products.    E-cigarettes. E-cigarettes are not approved by the FDA as a quit-smoking aid. So far, the research shows there is limited evidence that e-cigarettes are effective for helping smokers quit. They may also have substances that can cause cancer or life-threatening lung conditions. Experts advise not to use these products.  Quit-smoking products  Many products can help you quit smoking.  Some are prescription medicines that help curb your cravings and withdrawal symptoms. Other products slowly lessen the level of nicotine your body absorbs. Nicotine is the highly addictive substance found in cigarettes, cigars, and chewing tobacco. Nicotine replacement products can help get your body used to slowly decreasing amounts of nicotine after you quit smoking. These products include a nicotine patch, gum, lozenge, nasal spray, and inhaler. Always follow the directions for your medicine or product carefully. Your healthcare provider may tell you to start taking the prescription medicine a week before you plan to quit. Don't smoke while you use nicotine products. Doing so can harm your health.  To learn more    www.cdc.gov/tobacco/quit_smoking/ 800-QUIT-NOW (563-539-7654)    www.smokefree.gov 877-44U-QUIT (307-635-9457)    www.lung.org/stop-smoking/ 800-LUNGUSA (586-368-7850)    [employer] last reviewed this educational content on 1/1/2019 2000-2020 [employer]. [address] [place] [address], [place] [address]. All rights reserved. This information is not intended as a substitute for professional medical care. Always follow your healthcare professional's instructions.

## 2021-02-22 ENCOUNTER — TELEPHONE (OUTPATIENT)
Dept: FAMILY MEDICINE CLINIC | Age: 66
End: 2021-02-22

## 2021-02-22 NOTE — TELEPHONE ENCOUNTER
Deloris calls and says she just got her Prolia shot last week and is scheduled to get her COVID this week is that OK ?

## 2021-03-04 RX ORDER — BUPROPION HYDROCHLORIDE 200 MG/1
200 TABLET, EXTENDED RELEASE ORAL 2 TIMES DAILY
Qty: 60 TABLET | Refills: 1 | Status: SHIPPED | OUTPATIENT
Start: 2021-03-04 | End: 2021-05-12 | Stop reason: SDUPTHER

## 2021-03-04 NOTE — TELEPHONE ENCOUNTER
Pedro Jefferson called requesting a refill on the following medications:  Requested Prescriptions     Pending Prescriptions Disp Refills    buPROPion (WELLBUTRIN SR) 200 MG extended release tablet 60 tablet 1     Sig: Take 1 tablet by mouth 2 times daily TAKE 1 TABLET TWICE A DAY       Date of last visit: 1/21/2021  Date of next visit (if applicable):Visit date not found  Date of last refill: 01/07/21  Pharmacy Name: Johana Lee LPN

## 2021-03-08 ENCOUNTER — OFFICE VISIT (OUTPATIENT)
Dept: NEUROLOGY | Age: 66
End: 2021-03-08
Payer: MEDICARE

## 2021-03-08 VITALS
OXYGEN SATURATION: 95 % | DIASTOLIC BLOOD PRESSURE: 80 MMHG | SYSTOLIC BLOOD PRESSURE: 130 MMHG | BODY MASS INDEX: 26.31 KG/M2 | HEART RATE: 100 BPM | WEIGHT: 134 LBS | HEIGHT: 60 IN

## 2021-03-08 DIAGNOSIS — G44.209 TENSION HEADACHE: ICD-10-CM

## 2021-03-08 DIAGNOSIS — R41.3 MEMORY PROBLEM: Primary | ICD-10-CM

## 2021-03-08 DIAGNOSIS — M89.9 DISORDER OF BONE, UNSPECIFIED: ICD-10-CM

## 2021-03-08 PROCEDURE — 1036F TOBACCO NON-USER: CPT | Performed by: NURSE PRACTITIONER

## 2021-03-08 PROCEDURE — G8417 CALC BMI ABV UP PARAM F/U: HCPCS | Performed by: NURSE PRACTITIONER

## 2021-03-08 PROCEDURE — 3017F COLORECTAL CA SCREEN DOC REV: CPT | Performed by: NURSE PRACTITIONER

## 2021-03-08 PROCEDURE — G8427 DOCREV CUR MEDS BY ELIG CLIN: HCPCS | Performed by: NURSE PRACTITIONER

## 2021-03-08 PROCEDURE — 1123F ACP DISCUSS/DSCN MKR DOCD: CPT | Performed by: NURSE PRACTITIONER

## 2021-03-08 PROCEDURE — 4040F PNEUMOC VAC/ADMIN/RCVD: CPT | Performed by: NURSE PRACTITIONER

## 2021-03-08 PROCEDURE — G8484 FLU IMMUNIZE NO ADMIN: HCPCS | Performed by: NURSE PRACTITIONER

## 2021-03-08 PROCEDURE — 1090F PRES/ABSN URINE INCON ASSESS: CPT | Performed by: NURSE PRACTITIONER

## 2021-03-08 PROCEDURE — 99213 OFFICE O/P EST LOW 20 MIN: CPT | Performed by: NURSE PRACTITIONER

## 2021-03-08 PROCEDURE — G8399 PT W/DXA RESULTS DOCUMENT: HCPCS | Performed by: NURSE PRACTITIONER

## 2021-03-08 RX ORDER — DIVALPROEX SODIUM 250 MG/1
TABLET, EXTENDED RELEASE ORAL
COMMUNITY
Start: 2021-02-12

## 2021-03-08 NOTE — PATIENT INSTRUCTIONS
1. MRI brain WO contrast   2. Vitamin D level  3. Vitamin B6 level  4. Referral to neuropsychology re: memory trouble  5. Follow up after neuropsychology evaluation. 6. Call if any questions or concerns.

## 2021-03-08 NOTE — PROGRESS NOTES
lesion, dry  to touch. warm  Head: no rash, no icterus  Neck: There is no carotid bruits. The Neck is supple. There is no neck lymphadenopathy. Neuro: CN 2-12 grossly intact with no focal deficits. Power 5/5 Throughout symmetric, Reflexes are  symmetric. Long tracts are intact. Cerebellar exam is Intact. Sensory exam is intact to light touch. Gait is guarded, uses walker. she is unable to name the president. She has poor concentration. She struggles following 2 step commands. Her calculation is intact. She is up to date on recent events. Musculoskeletal:  Has no hand arthritis, no limitation of ROM in any of the four extremities. Lower extremities no edema  The abdomen is soft,  intact bowel sounds. DATA:  Results for orders placed or performed during the hospital encounter of 02/11/21   Comprehensive Metabolic Panel   Result Value Ref Range    Glucose 75 70 - 108 mg/dL    CREATININE 1.8 (H) 0.4 - 1.2 mg/dL    BUN 26 (H) 7 - 22 mg/dL    Sodium 141 135 - 145 meq/L    Potassium 4.3 3.5 - 5.2 meq/L    Chloride 102 98 - 111 meq/L    CO2 30 23 - 33 meq/L    Calcium 9.3 8.5 - 10.5 mg/dL    AST 21 5 - 40 U/L    Alkaline Phosphatase 69 38 - 126 U/L    Total Protein 6.9 6.1 - 8.0 g/dL    Albumin 4.2 3.5 - 5.1 g/dL    Total Bilirubin 0.2 (L) 0.3 - 1.2 mg/dL    ALT 18 11 - 66 U/L   Anion Gap   Result Value Ref Range    Anion Gap 9.0 8.0 - 16.0 meq/L   Glomerular Filtration Rate, Estimated   Result Value Ref Range    Est, Glom Filt Rate 28 (A) ml/min/1.73m2          Results for orders placed during the hospital encounter of 06/24/15   MRI Cervical Spine WO Contrast    Narrative PROCEDURE: MRI CERVICAL SPINE WO CONTRAST    CLINICAL INFORMATION: Cervical radiculopathy, Carpal tunnel syndrome, Neck pain, Bilateral hand numbness . Neck pain. Bilateral arm pain. The right arm is worse. COMPARISON: Cervical spine x-ray May 8, 2015.     TECHNIQUE: Sagittal T1, T2 and STIR sequences were obtained through the cervical spine. Axial fast and echo and gradient echo T2-weighted images were obtained. FINDINGS:    As seen on x-ray, the patient has anterior cervical fusion and discectomy at the C5 through C7 levels. The cervical vertebral bodies are normally aligned. There is normal marrow signal throughout. No suspicious osseous lesions are present. The facet joints are normally aligned. The cervical spinal cord is of normal caliber and signal intensity. The visualized aspects of the posterior fossa are normal.    On the axial images, at C2-3, there are very mild facet degenerative changes. There is no spinal canal or foraminal stenosis. At C3-4, there is a small posterior disc osteophyte complex. There is no spinal canal or foraminal stenosis. At C4-5, there is a very small posterior disc osteophyte complex. There is no spinal canal or foraminal stenosis. At C5-6, there has been prior fusion. There is no spinal canal or foraminal stenosis. At C6-7, there has been prior fusion. There is no spinal canal or foraminal stenosis. At C7-T1, there are no degenerative changes. There is no spinal canal or foraminal stenosis. There are no suspicious findings in the cervical soft tissues. Impression IMPRESSION:       1. Prior fusion of the C5-C7 levels. 2. Mild degenerative changes at the C3-4 and C4-5 levels without spinal canal or foraminal stenosis. Final report electronically signed by Dr. Josh Blount on 6/24/2015 10:03 AM       No results found for this or any previous visit. Results for orders placed during the hospital encounter of 04/26/18   CTA HEAD W WO CONTRAST    Narrative PROCEDURE: CTA HEAD W WO CONTRAST, CTA NECK W WO CONTRAST    CLINICAL INFORMATION: STROKE. Additional history obtained from the electronic medical record indicates the patient has forgetfulness, confusion and generalized weakness. COMPARISON: None available.     TECHNIQUE: 1 mm axial images were obtained through the head and neck after the fast bolus administration of contrast. A noncontrast localizer was obtained. 3-D reconstructions were performed on a dedicated 3-D workstation. These include multiplanar MPR   images and multiplanar MIP images. Centerline reconstructions were obtained of the carotid systems. 80 mL Isovue-370 intravenous contrast was given. All CT scans at this facility use dose modulation, iterative reconstruction, and/or weight-based dosing when appropriate to reduce radiation dose to as low as reasonably achievable. FINDINGS:    There is a common origin of the innominate and left common carotid arteries. Atherosclerotic calcification is noted within the aortic arch. The origins of the great vessels off the arch are patent. The origin of the right common carotid artery is not   well-visualized secondary to streak artifact from adjacent contrast bolus. The right common carotid artery, its bifurcation and cervical external/internal carotid arteries are within normal limits. The left common carotid artery, its bifurcation and left cervical external/internal carotid arteries are unremarkable. The origin of the right vertebral artery is patent. The right vertebral artery is nondominant. The remainder of the right vertebral artery is patent. The origin of the left vertebral artery is patent. The left vertebral artery is dominant. The remainder   of the left vertebral artery is unremarkable in course and caliber. The vertebral arteries join intracranially to form a normal-appearing basilar artery. Cranially, the petrous, cavernous and clinoid internal carotid arteries are within normal limits. The bilateral MCAs and ACAs are patent with normal arborization of the distal branches. There is a patent anterior communicating artery. Bilateral posterior   communicating arteries are also present. The bilateral ACAs and SCA's are patent. Bilateral patent AICAs and PICAs are also visualized.     The superior sagittal sinus, vein of Gera, internal cerebral veins, straight sinus, transverse sinuses and sigmoid sinuses are patent. Hypoattenuation is noted within the right frontal lobe secondary to a remote infarct. A mucous retention cyst or polyp is again noted within the left maxillary sinus. The visualized temporal bone structures are unremarkable. Postsurgical and multilevel   degenerative changes are present in the cervical spine. No suspicious osseous lesion is identified. No suspicious abnormality is identified within the visualized paraspinal soft tissues. The visualized lung apices are clear. Impression    1. No major branch occlusion, flow-limiting stenosis or aneurysm is identified intracranially. 2. No occlusion, flow-limiting stenosis, dissection or aneurysm is identified of the major cervical arterial structures. 3. There is encephalomalacia within the right frontal lobe corresponding to a remote infarct. **This report has been created using voice recognition software. It may contain minor errors which are inherent in voice recognition technology. **    Final report electronically signed by Dr. Sybil Mosley on 4/26/2018 12:24 PM     Results for orders placed during the hospital encounter of 04/26/18   CTA NECK W WO CONTRAST    Narrative PROCEDURE: CTA HEAD W WO CONTRAST, CTA [address]: STROKE. Additional history obtained from the electronic medical record indicates the patient has forgetfulness, confusion and generalized weakness. COMPARISON: None available. TECHNIQUE: 1 mm axial images were obtained through the head and neck after the fast bolus administration of contrast. A noncontrast localizer was obtained. 3-D reconstructions were performed on a dedicated 3-D workstation. These include multiplanar MPR   images and multiplanar MIP images. Centerline reconstructions were obtained of the carotid systems.  80 mL Isovue-370 intravenous structures are unremarkable. Postsurgical and multilevel   degenerative changes are present in the cervical spine. No suspicious osseous lesion is identified. No suspicious abnormality is identified within the visualized paraspinal soft tissues. The visualized lung apices are clear. Impression    1. No major branch occlusion, flow-limiting stenosis or aneurysm is identified intracranially. 2. No occlusion, flow-limiting stenosis, dissection or aneurysm is identified of the major cervical arterial structures. 3. There is encephalomalacia within the right frontal lobe corresponding to a remote infarct. **This report has been created using voice recognition software. It may contain minor errors which are inherent in voice recognition technology. **    Final report electronically signed by Dr. Judy Goldsmith on 4/26/2018 12:24 PM     Results for orders placed during the hospital encounter of 03/22/16   MRI Brain W/O contrast    Narrative PROCEDURE: MRI BRAIN WO CONTRAST    CLINICAL INFORMATION Changes, multiple strokes in the past.. Headaches. Right arm numbness. Visual changes. COMPARISON: Brain MRI 4/14/2014. .    TECHNIQUE: Multiplanar and multiple spin echo MRI images were obtained of the brain without contrast.        FINDINGS:           The diffusion-weighted images are normal.  The brain volume is mildly reduced. There is a moderate-sized old infarct in the right frontal lobe. There is volume loss and encephalomalacia. This is stable. There is a small old cortical infarct in the right   parietal lobe. No new areas of abnormal signal are noted. There are no intra-or extra-axial collections. There is no hydrocephalus, midline shift or mass effect. There is mineralization in the medial aspects of the basal ganglia bilaterally. There is evidence of old hemorrhage at the right frontal lobe infarct there is evidence of old hemorrhage in the right frontal lobe infarct.       The major intracranial   vascular flow voids are present. The midline craniocervical junction structures are normal.  The pituitary gland and brainstem are normal.    There is no significant change in the appearance of the brain compared to the prior study. .    There is a 2 cm mucous retention cyst in the left maxillary sinus. This has increased in size. Impression IMPRESSION:     1. No evidence of an acute infarct. 2. Stable appearing old infarcts in the right frontal lobe and right parietal lobe. 3. 2 cm mucous retention cyst versus polyp in the left maxillary sinus. This has increased in size. **This report has been created using voice recognition software. It may contain minor errors which are inherent in voice recognition technology. **    Final report electronically signed by Dr. Glenys Erazo on 3/23/2016 8:19 AM       Results for orders placed during the hospital encounter of 04/14/14   MRI [place] [address]          Patient Name: Dakota Parsons        Patient Type: Eliecer Petit           MRN:  [id number]        Gender:  Female                   Account Number:  [de-identified]        [facility] Phys:  Milena Lopez YOB: 1955        M. D. 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 TECHNIQUE:  Multiplanar and multispin echo including pre and post       gadolinium images of the brain were obtained. COMPARISON:  02/02/2013.          FINDINGS:  The Technologist:  Teodora George  RT(R)(MR)           Page 2 of 2              Print date/time:4/16/2014 4:20 PM       Results for orders placed during the hospital encounter of 03/27/19   CT Head WO Contrast    Narrative PROCEDURE: CT HEAD WO CONTRAST    CLINICAL INFORMATION: Weakness, hx CVA. COMPARISON: November 29, 2018    TECHNIQUE: Noncontrast 5 mm axial images were obtained through the brain. All CT scans at this facility use dose modulation, iterative reconstruction, and/or weight-based dosing when appropriate to reduce radiation dose to as low as reasonably achievable. FINDINGS:    There are no fractures. There is redemonstration of encephalomalacia of the right posterior lateral parietal lobe adjacent to the sylvian cistern. There is early developing compensatory ventricular changes. There is no acute hemorrhage or obvious change of large vessel infarct. Impression Stable CT of the brain. Negative exam for acute pathology. Old ischemic changes of the right posterior parietal region similar to prior study. **This report has been created using voice recognition software. It may contain minor errors which are inherent in voice recognition technology. **    Final report electronically signed by Dr. Thersa Babinski on 3/27/2019 10:33 PM          Assessment:     Diagnosis Orders   1. Memory problem  MRI BRAIN WO CONTRAST    Vitamin B6    Vitamin B12 & Folate    Vitamin D 25 Hydroxy    External Referral To Neuropsychology   2. Tension headache  MRI BRAIN WO CONTRAST    Vitamin B6    Vitamin B12 & Folate    Vitamin D 25 Hydroxy    External Referral To Neuropsychology   3. Disorder of bone, unspecified   Vitamin D 25 Hydroxy        She was last seen May 2019. Her headaches are well controlled. She feels her memory is worse. She did have [address] [place] test done by her psychiatrist and she did poorly, scoring 12/30. She does not ask repeated questions.  She feels she struggles in conversation and loses her train of thought easily. She feels she does have some depression as she was recently told she has stage 4 kidney disease. On exam, she is unable to name the president. She has poor concentration. She struggles following 2 step commands. Her calculation is intact. She is up to date on recent events. We will arrange for her to undergo MRI brain to screen for organic causes of her symptoms as well as checking lab work for vitamin levels. We will also order formal evaluation with neuropsychology re; memory trouble. After detailed discussion with patient and her  we agreed on the following plan. Plan:  1. MRI brain WO contrast   2. Vitamin D level  3. Vitamin B6 level  4. Referral to neuropsychology re: memory trouble  5. Follow up after neuropsychology evaluation. 6. Call if any questions or concerns. Time spent evaluating patient, reviewing records, counseling with more than 50% of the time spent for counseling was 28 min.     Faviola Heller CNP

## 2021-03-09 LAB
FOLATE: >25 NG/ML
VITAMIN B-12: 439 PG/ML (ref 180–914)
VITAMIN B6: 16 NMOL/L (ref 20–125)
VITAMIN D 25-HYDROXY: 68.6 NG/ML (ref 30–100)

## 2021-03-12 ENCOUNTER — TELEPHONE (OUTPATIENT)
Dept: NEUROLOGY | Age: 66
End: 2021-03-12

## 2021-03-12 ENCOUNTER — HOSPITAL ENCOUNTER (OUTPATIENT)
Dept: MRI IMAGING | Age: 66
Discharge: HOME | End: 2021-03-12
Payer: MEDICARE

## 2021-03-12 DIAGNOSIS — R41.3 MEMORY PROBLEM: ICD-10-CM

## 2021-03-12 DIAGNOSIS — G44.209 TENSION HEADACHE: ICD-10-CM

## 2021-03-12 PROCEDURE — 70551 MRI BRAIN STEM W/O DYE: CPT

## 2021-03-12 NOTE — TELEPHONE ENCOUNTER
----- Message from CARLOS MANUEL Burciaga CNP sent at 3/12/2021  1:48 PM EST -----  Please let patient know her vitamin B6 level is low=16. She needs to start vitamin B6 supplements, 50 mg daily, over the counter.   Please have her call the office in 2-3 months to obtain repeat vitamin B6 level  Millie Cooper CNP

## 2021-03-15 ENCOUNTER — TELEPHONE (OUTPATIENT)
Dept: NEUROLOGY | Age: 66
End: 2021-03-15

## 2021-03-15 ENCOUNTER — OFFICE VISIT (OUTPATIENT)
Dept: PSYCHOLOGY | Age: 66
End: 2021-03-15
Payer: MEDICARE

## 2021-03-15 DIAGNOSIS — F41.1 GENERALIZED ANXIETY DISORDER: Primary | Chronic | ICD-10-CM

## 2021-03-15 PROCEDURE — 1036F TOBACCO NON-USER: CPT | Performed by: PSYCHOLOGIST

## 2021-03-15 PROCEDURE — 90834 PSYTX W PT 45 MINUTES: CPT | Performed by: PSYCHOLOGIST

## 2021-03-15 NOTE — TELEPHONE ENCOUNTER
----- Message from CARLOS MANUEL Barlow - CNP sent at 3/12/2021  7:51 PM EST -----  Please let patient know MRI brain showed no acute findings.   Kori Agee CNP

## 2021-03-15 NOTE — PROGRESS NOTES
[facility] PSYCHOLOGY  [place] [address]  [address]  [place]  Dept: [phone]  Dept Fax: [phone]: [phone]    This note will not be viewable in [employer] for the following reason(s). This is a Psychotherapy Note. Patient: Deedee Byrnes  Visit Date: 3/15/2021  Referring Provider:   No ref. provider found    SUBJECTIVE DATA     CHIEF COMPLAINT:    Chief Complaint   Patient presents with    Anxiety    Depression    Stress     Patient update: This session was conducted as a face-to-face meeting, per patient's request, with appropriate social distancing and both patient and psychologist wearing masks. Patient reports   · She has gotten the first Pfizer vaccine  · Reports she had an MRI done after falling twice. Balance has been very poor, lots of bruises from those falls. · Wants to have PT to work on those, and I offered to message Dr. Mercedez Alves to inform her of this request  · Pleased that granddaughter is about to have her second baby--everything looks good on that front, healthwise, but concerned that Clarence Koehler has things so messy and disorganized  · Appetite continues to be poor, just eating very small portions. Bowels are in pretty good shape, has a BM about every three days. Plan from GI doc: Miralax, Linzess, etc. Feels relatively good about her current regimen. · We talked about ways to improve her regularity of medication adherence (which is pretty good, but  Galdamez Yasmeen sometimes makes mistakes). We agreed to use that as one of her mental exercises, with daughter Yue Cruz supervising it as Izabella Brown sets it up. · Continues to be a very supportive friend to [facility], who's recently   · Recognizes a need to have more friends, perhaps to return to the [facility] once she has her COVID vaccines. OBJECTIVE DATA     Physical Exam:    Mental Status Evaluation:   Orientation: Alert, oriented.    Mood:. anxious, a bit irritable      Affect: anxious      Appearance:  Normal   Activity:  Normal   Gait/Posture: Halting, as per usual, not using a cane or walker,shuffling, antalgic gait   Speech:  Normal, talkative   Thought Process: Tangential   Thought Content: Within Normal Limits   Cognition:  Normal   Memory: Normal   Insight:  good   Judgment: Normal   Suicidal Ideations: Denies Suicidal Ideations   Homicidal Ideations: Denies Homicidal Ideations   Medication Side Effects: absent       Attention Span Normal     Screenings Completed in This Encounter:                                      DIAGNOSIS AND ASSESSMENT DATA     DIAGNOSIS:  See visit diagnoses. 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 Doing OK with maintenance sessions. · Anxiety continues to be an issue, with reassurance-seeking her most common coping strategy. I want her to use cognitive reframing more freely. · Focus on meaning & purpose, being there for others, which she is quite good at    ASSESSMENT/PLAN   Follow-up:  No follow-ups on file. For patient:    · Resume good work at [employer], when possible. · Resume use of  [employer] hypnosis recording (on phone)  · Normally, has a very strong emotional/social support system, which is one of her great strengths.    · Needs to tap into that to work on the distress with her   · Has re-started bright light therapy, using it regularly  · Continue with some limits on your   · Let go of the anxiety and resentment about Ros[place]--negotiate a truce with the others in the family  · Focus on gratitudes    Homework assignment before next session:     [] Practice deep breathing 1-2 times per day for 15 minutes, as demonstrated in session, and/or:    [] Go to [employer] [facility]" web site and begin practicing with their free meditation podcasts    [x] Track thoughts that you think feed anxiety or depression    [] Go to \"[facility]\" web site, open up the \"Workbooks\" tab, and select a problem from the list that best suits your needs. Review the first module. [x] Begin to track physical activity. Even five minutes per day will be helpful.    [] Talk with your physician about whether it's OK to start fish oil (burpless) or flax oil, 1000 to 1200 mg per day    [] Most importantly, remember this guideline: \"Don't believe everything you think! \"   :)    [] Try \"Expressive Writing\" using the guidelines on the handout    [x] Start yoga class, as discussed. 1. Continue work at [employer]. Let the trainers tailor a program to your needs (once feasible)  2. Continue enjoying time with friends on a regular basis. 3. Assert yourself when it comes to [facility] and the finances. 4. Practice daily relaxation training, again. 5. Start journaling  6. Re-start yoga for multiple physical issues  7. Continue to focus on improving quality of life, for now  8. Keep your phone with you at all times, so as to be able to call for help if you fall. 9. Regular social time  10. Recognize your strengths, which include facilitating connections between other people. 11. Restart bright light therapy once the weather gets gloomy  12. Continue depression support group attendance  15. Do more walking and social events  14. Reframe cognitions about marriage--how to make things work  15. Spend more time with the friends who make you laugh  12. Continue to use the card I gave you, with the coping strategies. · Continue with better assertion re: health needs--feel free to postpone your procedure if you are more comfortable with that. · Also set limits with friends when going shopping  · Continue with self-care and \"Serenity Prayer\" for letting go of things you cannot control or fix.   · Let go of resentment at [facility] from the past  · Continue going to depression support group once it resumes  · Keep setting boundaries, as discussed  · Meet new friends as discussed in session  · Call Dr. Jade Tipton re: PT  · Restart Lumosity  · Take over pill minder setup with supervision    See items under \"Plan,\" above. Session lasted 47 minutes.     Provider Signature:  Electronically signed by Celi Hernandez, PhD on 3/15/2021 at 10:16 AM

## 2021-03-15 NOTE — TELEPHONE ENCOUNTER
Left voice message for patient to return call to office. Sent [employer] message to notify patient of above.

## 2021-03-15 NOTE — LETTER
Hi, Dr. Mikey Blount. I'm seeing Edwin Alaniz, who feels she has lost a lot of strength and balance, contributing to her many recent falls (twice in the past month). She is interested in having PT to work on these problems and prevent future fx or other complications.      Thanks much,  Jenny Alonso Ph.D.

## 2021-03-18 ENCOUNTER — OFFICE VISIT (OUTPATIENT)
Dept: PSYCHIATRY | Age: 66
End: 2021-03-18
Payer: MEDICARE

## 2021-03-18 DIAGNOSIS — F33.1 MODERATE EPISODE OF RECURRENT MAJOR DEPRESSIVE DISORDER (HCC): Primary | ICD-10-CM

## 2021-03-18 DIAGNOSIS — F41.1 GENERALIZED ANXIETY DISORDER: ICD-10-CM

## 2021-03-18 DIAGNOSIS — R41.3 MEMORY LOSS: ICD-10-CM

## 2021-03-18 PROCEDURE — 3017F COLORECTAL CA SCREEN DOC REV: CPT | Performed by: NURSE PRACTITIONER

## 2021-03-18 PROCEDURE — 1090F PRES/ABSN URINE INCON ASSESS: CPT | Performed by: NURSE PRACTITIONER

## 2021-03-18 PROCEDURE — G8484 FLU IMMUNIZE NO ADMIN: HCPCS | Performed by: NURSE PRACTITIONER

## 2021-03-18 PROCEDURE — 99213 OFFICE O/P EST LOW 20 MIN: CPT | Performed by: NURSE PRACTITIONER

## 2021-03-18 PROCEDURE — G8417 CALC BMI ABV UP PARAM F/U: HCPCS | Performed by: NURSE PRACTITIONER

## 2021-03-18 PROCEDURE — G8427 DOCREV CUR MEDS BY ELIG CLIN: HCPCS | Performed by: NURSE PRACTITIONER

## 2021-03-18 PROCEDURE — 4040F PNEUMOC VAC/ADMIN/RCVD: CPT | Performed by: NURSE PRACTITIONER

## 2021-03-18 PROCEDURE — 1036F TOBACCO NON-USER: CPT | Performed by: NURSE PRACTITIONER

## 2021-03-18 PROCEDURE — G8399 PT W/DXA RESULTS DOCUMENT: HCPCS | Performed by: NURSE PRACTITIONER

## 2021-03-18 PROCEDURE — 1123F ACP DISCUSS/DSCN MKR DOCD: CPT | Performed by: NURSE PRACTITIONER
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
## 2021-04-12 ENCOUNTER — OFFICE VISIT (OUTPATIENT)
Dept: PSYCHOLOGY | Age: 66
End: 2021-04-12
Payer: MEDICARE

## 2021-04-12 DIAGNOSIS — F41.1 GENERALIZED ANXIETY DISORDER: Primary | Chronic | ICD-10-CM

## 2021-04-12 PROCEDURE — 90834 PSYTX W PT 45 MINUTES: CPT | Performed by: PSYCHOLOGIST

## 2021-04-12 PROCEDURE — 1036F TOBACCO NON-USER: CPT | Performed by: PSYCHOLOGIST
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
## 2021-04-16 DIAGNOSIS — F51.04 PSYCHOPHYSIOLOGICAL INSOMNIA: ICD-10-CM

## 2021-04-16 RX ORDER — TRAZODONE HYDROCHLORIDE 50 MG/1
TABLET ORAL
Qty: 90 TABLET | Refills: 1 | Status: SHIPPED | OUTPATIENT
Start: 2021-04-16 | End: 2021-10-04 | Stop reason: SDUPTHER

## 2021-04-16 RX ORDER — TRAZODONE HYDROCHLORIDE 50 MG/1
TABLET ORAL
Qty: 90 TABLET | Refills: 1 | Status: CANCELLED | OUTPATIENT
Start: 2021-04-16

## 2021-04-26 ENCOUNTER — PATIENT MESSAGE (OUTPATIENT)
Dept: FAMILY MEDICINE CLINIC | Age: 66
End: 2021-04-26

## 2021-04-26 DIAGNOSIS — R42 DIZZINESS: Primary | ICD-10-CM

## 2021-04-26 DIAGNOSIS — R26.81 GAIT INSTABILITY: ICD-10-CM
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
## 2021-04-30 ENCOUNTER — OFFICE VISIT (OUTPATIENT)
Dept: CARDIOLOGY CLINIC | Age: 66
End: 2021-04-30
Payer: MEDICARE

## 2021-04-30 VITALS
DIASTOLIC BLOOD PRESSURE: 78 MMHG | WEIGHT: 138 LBS | HEIGHT: 60 IN | SYSTOLIC BLOOD PRESSURE: 120 MMHG | BODY MASS INDEX: 27.09 KG/M2 | HEART RATE: 68 BPM

## 2021-04-30 DIAGNOSIS — I10 ESSENTIAL HYPERTENSION: Primary | Chronic | ICD-10-CM

## 2021-04-30 DIAGNOSIS — E78.00 PURE HYPERCHOLESTEROLEMIA: ICD-10-CM

## 2021-04-30 DIAGNOSIS — R42 POSTURAL DIZZINESS: ICD-10-CM

## 2021-04-30 DIAGNOSIS — R94.31 ABNORMAL EKG: ICD-10-CM

## 2021-04-30 DIAGNOSIS — R07.89 CHEST PAIN, ATYPICAL: ICD-10-CM

## 2021-04-30 PROCEDURE — 1036F TOBACCO NON-USER: CPT | Performed by: INTERNAL MEDICINE

## 2021-04-30 PROCEDURE — 4040F PNEUMOC VAC/ADMIN/RCVD: CPT | Performed by: INTERNAL MEDICINE

## 2021-04-30 PROCEDURE — G8427 DOCREV CUR MEDS BY ELIG CLIN: HCPCS | Performed by: INTERNAL MEDICINE

## 2021-04-30 PROCEDURE — 3017F COLORECTAL CA SCREEN DOC REV: CPT | Performed by: INTERNAL MEDICINE

## 2021-04-30 PROCEDURE — 1123F ACP DISCUSS/DSCN MKR DOCD: CPT | Performed by: INTERNAL MEDICINE

## 2021-04-30 PROCEDURE — 1090F PRES/ABSN URINE INCON ASSESS: CPT | Performed by: INTERNAL MEDICINE

## 2021-04-30 PROCEDURE — G8399 PT W/DXA RESULTS DOCUMENT: HCPCS | Performed by: INTERNAL MEDICINE

## 2021-04-30 PROCEDURE — 99214 OFFICE O/P EST MOD 30 MIN: CPT | Performed by: INTERNAL MEDICINE

## 2021-04-30 PROCEDURE — G8417 CALC BMI ABV UP PARAM F/U: HCPCS | Performed by: INTERNAL MEDICINE
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
## 2021-05-12 RX ORDER — BUPROPION HYDROCHLORIDE 200 MG/1
200 TABLET, EXTENDED RELEASE ORAL 2 TIMES DAILY
Qty: 60 TABLET | Refills: 1 | Status: SHIPPED | OUTPATIENT
Start: 2021-05-12 | End: 2021-08-09 | Stop reason: SDUPTHER

## 2021-05-12 NOTE — TELEPHONE ENCOUNTER
Patel Valadez called requesting a refill on the following medications:  Requested Prescriptions     Pending Prescriptions Disp Refills    buPROPion (WELLBUTRIN SR) 200 MG extended release tablet 60 tablet 1     Sig: Take 1 tablet by mouth 2 times daily TAKE 1 TABLET TWICE A DAY       Date of last visit: 1/21/2021  Date of next visit (if applicable):Visit date not found  Date of last refill: 03/04/21  Pharmacy Name: [facility] [address]      Thanks,  Nito Mon, [address] [place] [address]

## 2021-05-17 ENCOUNTER — OFFICE VISIT (OUTPATIENT)
Dept: PSYCHOLOGY | Age: 66
End: 2021-05-17
Payer: MEDICARE

## 2021-05-17 DIAGNOSIS — F33.1 MODERATE EPISODE OF RECURRENT MAJOR DEPRESSIVE DISORDER (HCC): Primary | ICD-10-CM

## 2021-05-17 PROCEDURE — 1036F TOBACCO NON-USER: CPT | Performed by: PSYCHOLOGIST

## 2021-05-17 PROCEDURE — 90834 PSYTX W PT 45 MINUTES: CPT | Performed by: PSYCHOLOGIST

## 2021-05-17 NOTE — Clinical Note
Dr. Wandy Bradley! Deloris's gait is getting worse, but fortunately she has not had any falls. She is interested in PT to strengthen legs and improve balance. Please let me know if you have any questions or wish to confer. Thanks!

## 2021-05-17 NOTE — PROGRESS NOTES
[facility]  [place] [address]  [address]  [address] [facility]  Dept: [phone]  Dept Fax: [phone]: [phone]    This note will not be viewable in [employer] for the following reason(s). This is a Psychotherapy Note. Patient: Ingris Fink  Visit Date: 5/17/2021  Referring Provider:   No ref. provider found    SUBJECTIVE DATA     CHIEF COMPLAINT:    Chief Complaint   Patient presents with    Anxiety    Depression     Patient update: This session was conducted as a face-to-face meeting, per patient's request, with appropriate social distancing and both patient and psychologist wearing masks. Patient reports   · [facility], decided not to [facility] there. · Had restarted Lumosity, playing other brain games, keeps busy  · Saw friend Julieta the other day, feels guilty about that  · Has switched Wellbutrin to morning, doing better with the sleep. Sleeps well ow, 8-9 hours/night, up by about 9  · Knows she probably needs PT to get stronger and better balance. [address] [place] [address] about that  · Has been dealing with GI sx and feeling better, managing that OK  · Wants to go back to the [facility], be around people, make new friends  · Agrees to go with friend Meghandina Tomlin to get started, which will help  · Has been reading a lot of articles about the dangers of being sedentary, trying to make better food choices  · Used to go three times per week, would benefit, but might have fun doing it. · Goals: Get strength built back up, meet other people, socialize  · Has been playing games on the weekend with sister and her   · Good family support--very touched by Elen Lucia putting in for her to win a Mother's Day contest, which she won. He also took her out to [facility], saw her kids for the day. · Doing well, overall.  Has decided she doesn't need to see Psychiatry    OBJECTIVE DATA     Physical Exam:    Mental Status Evaluation: free meditation podcasts    [x] Track thoughts that you think feed anxiety or depression    [] Go to \"[facility] Clinical Interventions\" web site, open up the \"Workbooks\" tab, and select a problem from the list that best suits your needs. Review the first module. [x] Begin to track physical activity. Even five minutes per day will be helpful.    [] Talk with your physician about whether it's OK to start fish oil (burpless) or flax oil, 1000 to 1200 mg per day    [] Most importantly, remember this guideline: \"Don't believe everything you think! \"   :)    [] Try \"Expressive Writing\" using the guidelines on the handout    [x] Start yoga class, as discussed. 1. Continue work at [employer]. Let the trainers tailor a program to your needs (once feasible)  2. Continue enjoying time with friends on a regular basis. 3. Assert yourself when it comes to [employer] and the finances. 4. Practice daily relaxation training, again. 5. Start journaling  6. Re-start yoga for multiple physical issues  7. Continue to focus on improving quality of life, for now  8. Keep your phone with you at all times, so as to be able to call for help if you fall. 9. Regular social time  10. Recognize your strengths, which include facilitating connections between other people. 11. Restart bright light therapy once the weather gets gloomy  12. Continue depression support group attendance  15. Do more walking and social events  14. Reframe cognitions about marriage--how to make things work  15. Spend more time with the friends who make you laugh  12. Continue to use the card I gave you, with the coping strategies. · Continue with better assertion re: health needs--feel free to postpone your procedure if you are more comfortable with that. · Also set limits with friends when going shopping  · Continue with self-care and \"Serenity Prayer\" for letting go of things you cannot control or fix.   · Let go of resentment at [employer] from the past  · Set up regular social activities  · Keep setting boundaries, as discussed  · Meet new friends as discussed in session  · Call Dr. Paul Jimenez re: PT and also OT driving evaluation, if you have questions  · Switch Wellbutrin to a.m. dosing  · Take over pill minder setup with supervision  · Ask friend Andra Ludwig to go to the [facility] with you. See items under \"Plan,\" above. Session lasted 48  minutes.     Provider Signature:  Electronically signed by Nanda Parra, PhD on 5/17/2021 at 10:50 AM

## 2021-05-26 DIAGNOSIS — K58.9 IRRITABLE BOWEL SYNDROME WITHOUT DIARRHEA: ICD-10-CM

## 2021-05-26 NOTE — TELEPHONE ENCOUNTER
[facility] [place] called requesting a refill on the following medications:  Requested Prescriptions     Pending Prescriptions Disp Refills    chlordiazePOXIDE-clidinium (LIBRAX) 5-2.5 MG per capsule 180 capsule 0     Sig: Take 1 capsule by mouth 2 times daily.  TAKE 1 CAPSULE TWICE A DAY AS NEEDED FOR PAIN       Date of last visit: 1/21/2021  Date of next visit (if applicable):Visit date not found  Pharmacy Name: [facility] ([address] [place] [address])

## 2021-05-27 RX ORDER — CHLORDIAZEPOXIDE HYDROCHLORIDE AND CLIDINIUM BROMIDE 5; 2.5 MG/1; MG/1
1 CAPSULE ORAL 2 TIMES DAILY
Qty: 180 CAPSULE | Refills: 0 | OUTPATIENT
Start: 2021-05-27

## 2021-06-11 DIAGNOSIS — K58.9 IRRITABLE BOWEL SYNDROME WITHOUT DIARRHEA: ICD-10-CM

## 2021-06-11 RX ORDER — CHLORDIAZEPOXIDE HYDROCHLORIDE AND CLIDINIUM BROMIDE 5; 2.5 MG/1; MG/1
1 CAPSULE ORAL 2 TIMES DAILY
Qty: 180 CAPSULE | Refills: 0 | Status: SHIPPED | OUTPATIENT
Start: 2021-06-11 | End: 2021-12-09 | Stop reason: SDUPTHER

## 2021-06-21 ENCOUNTER — TELEPHONE (OUTPATIENT)
Dept: FAMILY MEDICINE CLINIC | Age: 66
End: 2021-06-21

## 2021-06-23 ENCOUNTER — HOSPITAL ENCOUNTER (OUTPATIENT)
Age: 66
Discharge: HOME | End: 2021-06-23
Payer: MEDICARE

## 2021-06-23 DIAGNOSIS — N18.32 STAGE 3B CHRONIC KIDNEY DISEASE (HCC): ICD-10-CM

## 2021-06-23 DIAGNOSIS — I10 ESSENTIAL HYPERTENSION: ICD-10-CM

## 2021-06-23 LAB
ANION GAP SERPL CALCULATED.3IONS-SCNC: 8 MEQ/L (ref 8–16)
BUN BLDV-MCNC: 22 MG/DL (ref 7–22)
CALCIUM SERPL-MCNC: 10 MG/DL (ref 8.5–10.5)
CHLORIDE BLD-SCNC: 104 MEQ/L (ref 98–111)
CO2: 30 MEQ/L (ref 23–33)
CREAT SERPL-MCNC: 2 MG/DL (ref 0.4–1.2)
GFR SERPL CREATININE-BSD FRML MDRD: 25 ML/MIN/1.73M2
GLUCOSE BLD-MCNC: 90 MG/DL (ref 70–108)
POTASSIUM SERPL-SCNC: 4.1 MEQ/L (ref 3.5–5.2)
SODIUM BLD-SCNC: 142 MEQ/L (ref 135–145)

## 2021-06-23 PROCEDURE — 36415 COLL VENOUS BLD VENIPUNCTURE: CPT

## 2021-06-23 PROCEDURE — 80048 BASIC METABOLIC PNL TOTAL CA: CPT

## 2021-06-29 ENCOUNTER — HOSPITAL ENCOUNTER (OUTPATIENT)
Age: 66
Discharge: HOME | End: 2021-06-29
Payer: MEDICARE

## 2021-06-29 ENCOUNTER — HOSPITAL ENCOUNTER (OUTPATIENT)
Dept: GENERAL RADIOLOGY | Age: 66
Discharge: HOME | End: 2021-06-29
Payer: MEDICARE

## 2021-06-29 ENCOUNTER — OFFICE VISIT (OUTPATIENT)
Dept: FAMILY MEDICINE CLINIC | Age: 66
End: 2021-06-29
Payer: MEDICARE

## 2021-06-29 VITALS
BODY MASS INDEX: 27.09 KG/M2 | HEIGHT: 60 IN | WEIGHT: 138 LBS | SYSTOLIC BLOOD PRESSURE: 118 MMHG | DIASTOLIC BLOOD PRESSURE: 72 MMHG | OXYGEN SATURATION: 98 % | HEART RATE: 77 BPM | TEMPERATURE: 97 F

## 2021-06-29 DIAGNOSIS — F41.9 ANXIETY: ICD-10-CM

## 2021-06-29 DIAGNOSIS — M54.50 CHRONIC BILATERAL LOW BACK PAIN WITHOUT SCIATICA: ICD-10-CM

## 2021-06-29 DIAGNOSIS — R10.84 GENERALIZED ABDOMINAL PAIN: Primary | ICD-10-CM

## 2021-06-29 DIAGNOSIS — R10.84 GENERALIZED ABDOMINAL PAIN: ICD-10-CM

## 2021-06-29 DIAGNOSIS — G89.29 CHRONIC BILATERAL LOW BACK PAIN WITHOUT SCIATICA: ICD-10-CM

## 2021-06-29 DIAGNOSIS — K59.00 CONSTIPATION, UNSPECIFIED CONSTIPATION TYPE: ICD-10-CM

## 2021-06-29 PROCEDURE — G8417 CALC BMI ABV UP PARAM F/U: HCPCS | Performed by: FAMILY MEDICINE

## 2021-06-29 PROCEDURE — G8399 PT W/DXA RESULTS DOCUMENT: HCPCS | Performed by: FAMILY MEDICINE

## 2021-06-29 PROCEDURE — 1036F TOBACCO NON-USER: CPT | Performed by: FAMILY MEDICINE

## 2021-06-29 PROCEDURE — 4040F PNEUMOC VAC/ADMIN/RCVD: CPT | Performed by: FAMILY MEDICINE

## 2021-06-29 PROCEDURE — G8427 DOCREV CUR MEDS BY ELIG CLIN: HCPCS | Performed by: FAMILY MEDICINE

## 2021-06-29 PROCEDURE — 1123F ACP DISCUSS/DSCN MKR DOCD: CPT | Performed by: FAMILY MEDICINE

## 2021-06-29 PROCEDURE — 1090F PRES/ABSN URINE INCON ASSESS: CPT | Performed by: FAMILY MEDICINE

## 2021-06-29 PROCEDURE — 99214 OFFICE O/P EST MOD 30 MIN: CPT | Performed by: FAMILY MEDICINE

## 2021-06-29 PROCEDURE — 74018 RADEX ABDOMEN 1 VIEW: CPT

## 2021-06-29 PROCEDURE — 3017F COLORECTAL CA SCREEN DOC REV: CPT | Performed by: FAMILY MEDICINE

## 2021-06-29 NOTE — PROGRESS NOTES
skin once, Disp: , Rfl:     folic acid (FOLVITE) 737 MCG tablet, Take 400 mcg by mouth daily, Disp: , Rfl:     b complex vitamins capsule, Take 1 capsule by mouth daily, Disp: 100 capsule, Rfl: 3    FISH OIL, 1,000 mg by Does not apply route 2 times daily Indications: Decreased Energy , Disp: , Rfl:     aspirin 81 MG tablet, Take 81 mg by mouth daily. , Disp: , Rfl:     Allergies   Allergen Reactions    Actos [Pioglitazone] Nausea And Vomiting    Augmentin [Amoxicillin-Pot Clavulanate] Diarrhea    Ciprofloxacin      unsure    Other Itching     Dog, cat, pet dander    Sulfa Antibiotics Rash       Review of Systems  No fever/chills. No N/V. Appetite a bit less. Denies belly pain but feel bloated. Objective:     /72 (Site: Left Upper Arm, Position: Sitting, Cuff Size: Medium Adult)   Pulse 77   Temp 97 °F (36.1 °C) (Skin)   Ht 5' (1.524 m)   Wt 138 lb (62.6 kg)   SpO2 98%   BMI 26.95 kg/m²     Physical Exam  Gen: NAD, AAO x 3, coherent, pleasant  CTAB. RRR  Abd: full lower and left side but soft, Bowels sounds present. No discomfort. Assessment/Plan: Dionna Todd was seen today for anxiety and constipation. Diagnoses and all orders for this visit:    Generalized abdominal pain  -     XR ABDOMEN (KUB) (SINGLE AP VIEW); Future    Anxiety    Constipation, unspecified constipation type    Chronic bilateral low back pain without sciatica      Patient Instructions   As long as Xray okay, then  -- 1/2 bottle of Mag Citrate  -- use emanuel Fleets enema   -- repeat in am if needed     Hydrate well   Light diet -- easy to digest  Continue probiotic    Check out Stress Comfort 2 a day     Okay to use TENS units        Return in about 3 months (around 9/29/2021).     Future Appointments   Date Time Provider [facility]   7/8/2021 10:00 AM Willy Winters MD N [address] [place], [place] [address]P - [place]   7/12/2021 10:00 AM Go Mack, PhD HARLEEN SERNA [facility] - [address] [facility]   8/9/2021 10:00 AM Go Mack, PhD Angela Strange [id number] [facility]   9/20/2021 10:00 AM Nancy Paul, PhD N [id number] [facility]   10/11/2021 10:00 AM Nancy Paul, PhD N [id number] [facility]   10/29/2021 12:00 PM Lennie Calvo MD N SRPX [facility] [address] [facility]   11/15/2021 10:00 AM Nancy Paul, PhD N [id number] [facility]   12/13/2021 10:00 AM Nancy Paul PhD N SRPXPsychl [address]          This office note has been dictated. Effort was made to review for errors but some may have been missed. Please contact Briana Mathews of [facility] for clarification if needed.        Electronically signed by Alex Craig MD on 6/29/2021 at 12:34 PM

## 2021-06-29 NOTE — PATIENT INSTRUCTIONS
As long as Xray okay, then  -- 1/2 bottle of Mag Citrate  -- use emanuel Fleets enema   -- repeat in am if needed     Hydrate well   Light diet -- easy to digest  Continue probiotic    Check out Stress Comfort 2 a day     Okay to use TENS units

## 2021-06-30 NOTE — RESULT ENCOUNTER NOTE
I called patient and let her know of xray findings. She reported going home and emptying some of her BM. With extent noted on xray, she will proceed with plan discussed and continue to work with GI for ongoing bowel care.

## 2021-07-08 ENCOUNTER — OFFICE VISIT (OUTPATIENT)
Dept: NEPHROLOGY | Age: 66
End: 2021-07-08
Payer: MEDICARE

## 2021-07-08 VITALS
DIASTOLIC BLOOD PRESSURE: 76 MMHG | WEIGHT: 127 LBS | HEART RATE: 83 BPM | OXYGEN SATURATION: 98 % | BODY MASS INDEX: 24.8 KG/M2 | SYSTOLIC BLOOD PRESSURE: 121 MMHG

## 2021-07-08 DIAGNOSIS — I10 ESSENTIAL HYPERTENSION: Primary | ICD-10-CM

## 2021-07-08 DIAGNOSIS — N18.4 STAGE 4 CHRONIC KIDNEY DISEASE (HCC): ICD-10-CM

## 2021-07-08 PROCEDURE — 1090F PRES/ABSN URINE INCON ASSESS: CPT | Performed by: INTERNAL MEDICINE

## 2021-07-08 PROCEDURE — 1036F TOBACCO NON-USER: CPT | Performed by: INTERNAL MEDICINE

## 2021-07-08 PROCEDURE — G8399 PT W/DXA RESULTS DOCUMENT: HCPCS | Performed by: INTERNAL MEDICINE

## 2021-07-08 PROCEDURE — 99213 OFFICE O/P EST LOW 20 MIN: CPT | Performed by: INTERNAL MEDICINE

## 2021-07-08 PROCEDURE — G8428 CUR MEDS NOT DOCUMENT: HCPCS | Performed by: INTERNAL MEDICINE

## 2021-07-08 PROCEDURE — 3017F COLORECTAL CA SCREEN DOC REV: CPT | Performed by: INTERNAL MEDICINE

## 2021-07-08 PROCEDURE — G8420 CALC BMI NORM PARAMETERS: HCPCS | Performed by: INTERNAL MEDICINE

## 2021-07-08 PROCEDURE — 4040F PNEUMOC VAC/ADMIN/RCVD: CPT | Performed by: INTERNAL MEDICINE

## 2021-07-08 PROCEDURE — 1123F ACP DISCUSS/DSCN MKR DOCD: CPT | Performed by: INTERNAL MEDICINE

## 2021-07-08 NOTE — PROGRESS NOTES
[facility]        Outpatient Follow-Up note         7/8/2021 10:56 AM    Patient Name:   Sharon Anton  YOB: 1955  Primary Care Physician:  George Smyth MD      Chief Complaint / Reason for follow-up : Follow Up of CKD     Interval History :  Patient seen and examined by me. Feels well. No CP or SOB . Having madyson neck issues . Past History :  Past Medical History:   Diagnosis Date    Allergic rhinitis     Anxiety     CVA (cerebral infarction)     Depression     Fibromyalgia     Headache(784.0)     Hyperlipidemia     Hypertension     Irritable bowel syndrome     Kidney failure     Unspecified cerebral artery occlusion with cerebral infarction     Unspecified sleep apnea      Past Surgical History:   Procedure Laterality Date    CARPAL TUNNEL RELEASE Right     [facility]  2012    [facility]    ENDOSCOPY, COLON, DIAGNOSTIC  unsure    EYE SURGERY      lasik    HEMORRHOID SURGERY  unsure    HYSTERECTOMY      total    NECK SURGERY  18  years ago    fusion    SINUS SURGERY  10 years ago    TUBAL LIGATION          Medications :     Outpatient Medications Marked as Taking for the 7/8/21 encounter (Office Visit) with Estefania Forde MD   Medication Sig Dispense Refill    chlordiazePOXIDE-clidinium (LIBRAX) 5-2.5 MG per capsule Take 1 capsule by mouth 2 times daily.  TAKE 1 CAPSULE TWICE A DAY AS NEEDED FOR PAIN 180 capsule 0    buPROPion (WELLBUTRIN SR) 200 MG extended release tablet Take 1 tablet by mouth 2 times daily TAKE 1 TABLET TWICE A DAY 60 tablet 1    traZODone (DESYREL) 50 MG tablet TAKE 1 TABLET NIGHTLY 90 tablet 1    divalproex (DEPAKOTE ER) 250 MG extended release tablet TAKE 1 TABLET BY MOUTH DAILY      bisacodyl (DULCOLAX) 5 MG EC tablet Take 5 mg by mouth daily as needed for Constipation      calcium-cholecalciferol (OYSCO 500 + D) 500-200 MG-UNIT per tablet Take 1 tablet by mouth 2 times daily 360 tablet 1    amLODIPine (NORVASC) 5 MG tablet TAKE 1 TABLET BY MOUTH DAILY 90 tablet 3    simethicone (MI-ACID GAS RELIEF) 80 MG chewable tablet Take 80 mg by mouth 2 times daily      clopidogrel (PLAVIX) 75 MG tablet TAKE 1 TABLET DAILY 90 tablet 1    simvastatin (ZOCOR) 20 MG tablet TAKE 1 TABLET DAILY 90 tablet 1    pantoprazole (PROTONIX) 40 MG tablet Take 1 tablet by mouth daily (Patient taking differently: Take 40 mg by mouth as needed ) 90 tablet 1    triamcinolone (NASACORT ALLERGY 24HR) 55 MCG/ACT nasal inhaler 2 sprays by Each Nostril route daily As needed      Polyethylene Glycol 3350 (MIRALAX PO) Take by mouth      cetirizine (ZYRTEC ALLERGY) 10 MG tablet Take 1 tablet by mouth daily 30 tablet 11    Tetrahydrozoline-Zn Sulfate (EYE DROPS ALLERGY RELIEF OP) Apply 1 drop to eye daily      Probiotic Product (PROBIOTIC-10) CHEW Take by mouth daily      linaclotide (LINZESS) 290 MCG CAPS capsule Take 290 mcg by mouth every morning (before breakfast)       MAGNESIUM CITRATE PO Take by mouth as needed (weekly)      denosumab (PROLIA) 60 MG/ML SOLN SC injection Inject 60 mg into the skin once      folic acid (FOLVITE) 385 MCG tablet Take 400 mcg by mouth daily      b complex vitamins capsule Take 1 capsule by mouth daily 100 capsule 3    FISH OIL 1,000 mg by Does not apply route 2 times daily Indications: Decreased Energy       aspirin 81 MG tablet Take 81 mg by mouth daily.            Vitals     /76 (Site: Right Upper Arm, Position: Sitting, Cuff Size: Medium Adult)   Pulse 83   Wt 127 lb (57.6 kg)   SpO2 98%   BMI 24.80 kg/m²  Wt Readings from Last 3 Encounters:   07/08/21 127 lb (57.6 kg)   06/29/21 138 lb (62.6 kg)   04/30/21 138 lb (62.6 kg)        Physical Exam     General -- no distress  Lungs -- clear  Heart -- S1, S2 heard, JVD - no  Abdomen - soft, non-tender  Extremities -- no edema  CNS - awake and alert    Labs, Radiology and Tests    Labs -      1/18 7/18 12/18 8/19 7/20 1/20 6/21

## 2021-07-12 ENCOUNTER — NURSE ONLY (OUTPATIENT)
Dept: LAB | Age: 66
End: 2021-07-12

## 2021-07-12 ENCOUNTER — OFFICE VISIT (OUTPATIENT)
Dept: PSYCHOLOGY | Age: 66
End: 2021-07-12
Payer: MEDICARE

## 2021-07-12 DIAGNOSIS — N18.4 STAGE 4 CHRONIC KIDNEY DISEASE (HCC): ICD-10-CM

## 2021-07-12 DIAGNOSIS — F33.0 MILD EPISODE OF RECURRENT MAJOR DEPRESSIVE DISORDER (HCC): Primary | ICD-10-CM

## 2021-07-12 DIAGNOSIS — I10 ESSENTIAL HYPERTENSION: ICD-10-CM

## 2021-07-12 LAB
ALBUMIN SERPL-MCNC: 4.4 G/DL (ref 3.5–5.1)
ALP BLD-CCNC: 70 U/L (ref 38–126)
ALT SERPL-CCNC: 14 U/L (ref 11–66)
ANION GAP SERPL CALCULATED.3IONS-SCNC: 11 MEQ/L (ref 8–16)
AST SERPL-CCNC: 22 U/L (ref 5–40)
BILIRUB SERPL-MCNC: 0.3 MG/DL (ref 0.3–1.2)
BUN BLDV-MCNC: 18 MG/DL (ref 7–22)
CALCIUM SERPL-MCNC: 8.7 MG/DL (ref 8.5–10.5)
CHLORIDE BLD-SCNC: 106 MEQ/L (ref 98–111)
CO2: 25 MEQ/L (ref 23–33)
CREAT SERPL-MCNC: 1.9 MG/DL (ref 0.4–1.2)
GFR SERPL CREATININE-BSD FRML MDRD: 26 ML/MIN/1.73M2
GLUCOSE BLD-MCNC: 91 MG/DL (ref 70–108)
MAGNESIUM: 1.9 MG/DL (ref 1.6–2.4)
POTASSIUM SERPL-SCNC: 4.1 MEQ/L (ref 3.5–5.2)
PTH INTACT: 226 PG/ML (ref 15–65)
SODIUM BLD-SCNC: 142 MEQ/L (ref 135–145)
TOTAL PROTEIN: 6.9 G/DL (ref 6.1–8)

## 2021-07-12 PROCEDURE — 1036F TOBACCO NON-USER: CPT | Performed by: PSYCHOLOGIST

## 2021-07-12 PROCEDURE — 90837 PSYTX W PT 60 MINUTES: CPT | Performed by: PSYCHOLOGIST

## 2021-07-12 NOTE — PROGRESS NOTES
[facility]  [place] [address]  [address]  [address]  Dept: [phone]  Dept Fax: [phone]: [phone]    This note will not be viewable in [facility] for the following reason(s). This is a Psychotherapy Note. Patient: Go Care  Visit Date: 2021  Referring Provider:   No ref. provider found    SUBJECTIVE DATA     CHIEF COMPLAINT:    Chief Complaint   Patient presents with    Anxiety    Depression    Stress     Patient update: This session was conducted as a face-to-face meeting, per patient's request, with appropriate social distancing and both patient and psychologist wearing masks. Patient reports   · Lots of stress, having multiple friends who have  or are nearing death  · Still struggles with GI sx, either way too constipated or way too much diarrhea. Feels she is working well with Dr. Elza Julian  · Dealing with kidney issues--really likes and trusts Dr. Haim Esteban  · Also dealing with neck pain issues, being worked up on that front  · Daquan Sprague has been very kind, helpful, \"almost too good to be true. \"  · Continues with Lumosity, trying to increase her mental agility  · Watching quiz bowls with Daquan Sprague, sees if she can predict the answers  · Still no depression support group, will start up in the fall  · Able to have fun when with friends  · Getting some pleasure at taking care of grandson Salome Morrison, who is thriving  · Pleased also that son-in-law Shea Sol is doing well, still not drinking  · Discussed anxious/paranoid thoughts about  that she has gotten occasionally for years, about , and how to dismiss them without engaging    OBJECTIVE DATA     Physical Exam:    Mental Status Evaluation:   Orientation: Alert, oriented. Mood:.  Anxious, a little subdued      Affect:  Anxious, mood congruent      Appearance:  Normal   Activity:  Normal   Gait/Posture: Halting, as per usual, shuffling, short steps, uses a walker Speech:  Normal, talkative   Thought Process: Tangential   Thought Content: Within Normal Limits   Cognition:  Normal   Memory: Normal   Insight:  good   Judgment: Normal   Suicidal Ideations: Denies Suicidal Ideations   Homicidal Ideations: Denies Homicidal Ideations   Medication Side Effects: absent       Attention Span Normal     Screenings Completed in This Encounter:      See scanned PHQ and GABBY in Media tab                                DIAGNOSIS AND ASSESSMENT DATA     DIAGNOSIS:  See visit diagnoses. 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 Doing OK with maintenance sessions. · Anxiety continues to be an issue, with reassurance-seeking her most common coping strategy. I want her to use cognitive reframing more freely. · Focus on meaning & purpose, being there for others, which she is quite good at    ASSESSMENT/PLAN   Follow-up:  No follow-ups on file. Homework assignment before next session:     [] Practice deep breathing 1-2 times per day for 15 minutes, as demonstrated in session, and/or:    [] Go to [employer] [facility]\" web site and begin practicing with their free meditation podcasts    [x] Track thoughts that you think feed anxiety or depression    [] Go to [employer]\" web site, open up the \"Workbooks\" tab, and select a problem from the list that best suits your needs. Review the first module. [x] Begin to track physical activity. Even five minutes per day will be helpful.    [] Talk with your physician about whether it's OK to start fish oil (burpless) or flax oil, 1000 to 1200 mg per day    [] Most importantly, remember this guideline: \"Don't believe everything you think! \"   :)    [] Try \"Expressive Writing\" using the guidelines on the handout    1. Continue work at [employer].  Let the trainers tailor a

## 2021-07-14 ENCOUNTER — HOSPITAL ENCOUNTER (OUTPATIENT)
Dept: MRI IMAGING | Age: 66
Discharge: HOME | End: 2021-07-14
Payer: MEDICARE

## 2021-07-14 DIAGNOSIS — M51.36 OTHER INTERVERTEBRAL DISC DEGENERATION, LUMBAR REGION: ICD-10-CM

## 2021-07-14 DIAGNOSIS — M54.12 RADICULOPATHY, CERVICAL: ICD-10-CM

## 2021-07-14 PROCEDURE — 72141 MRI NECK SPINE W/O DYE: CPT

## 2021-07-14 PROCEDURE — 72148 MRI LUMBAR SPINE W/O DYE: CPT

## 2021-07-16 ENCOUNTER — OFFICE VISIT (OUTPATIENT)
Dept: FAMILY MEDICINE CLINIC | Age: 66
End: 2021-07-16
Payer: MEDICARE

## 2021-07-16 ENCOUNTER — NURSE TRIAGE (OUTPATIENT)
Dept: OTHER | Facility: CLINIC | Age: 66
End: 2021-07-16

## 2021-07-16 VITALS
DIASTOLIC BLOOD PRESSURE: 78 MMHG | HEART RATE: 80 BPM | HEIGHT: 60 IN | SYSTOLIC BLOOD PRESSURE: 136 MMHG | TEMPERATURE: 97.1 F | BODY MASS INDEX: 24.54 KG/M2 | WEIGHT: 125 LBS | OXYGEN SATURATION: 98 %

## 2021-07-16 DIAGNOSIS — K59.00 CONSTIPATION, UNSPECIFIED CONSTIPATION TYPE: ICD-10-CM

## 2021-07-16 DIAGNOSIS — R30.0 DYSURIA: Primary | ICD-10-CM

## 2021-07-16 DIAGNOSIS — R10.84 GENERALIZED ABDOMINAL PAIN: ICD-10-CM

## 2021-07-16 LAB
BILIRUBIN, POC: NEGATIVE
BLOOD URINE, POC: NEGATIVE
CLARITY, POC: CLEAR
COLOR, POC: YELLOW
GLUCOSE URINE, POC: NEGATIVE
KETONES, POC: NEGATIVE
LEUKOCYTE EST, POC: NEGATIVE
NITRITE, POC: NORMAL
PH, POC: 5.5
PROTEIN, POC: NEGATIVE
SPECIFIC GRAVITY, POC: 1
UROBILINOGEN, POC: 0.2

## 2021-07-16 PROCEDURE — 99213 OFFICE O/P EST LOW 20 MIN: CPT | Performed by: FAMILY MEDICINE

## 2021-07-16 PROCEDURE — 4040F PNEUMOC VAC/ADMIN/RCVD: CPT | Performed by: FAMILY MEDICINE

## 2021-07-16 PROCEDURE — 1036F TOBACCO NON-USER: CPT | Performed by: FAMILY MEDICINE

## 2021-07-16 PROCEDURE — 81003 URINALYSIS AUTO W/O SCOPE: CPT | Performed by: FAMILY MEDICINE

## 2021-07-16 PROCEDURE — 1090F PRES/ABSN URINE INCON ASSESS: CPT | Performed by: FAMILY MEDICINE

## 2021-07-16 PROCEDURE — G8399 PT W/DXA RESULTS DOCUMENT: HCPCS | Performed by: FAMILY MEDICINE

## 2021-07-16 PROCEDURE — G8420 CALC BMI NORM PARAMETERS: HCPCS | Performed by: FAMILY MEDICINE

## 2021-07-16 PROCEDURE — G8427 DOCREV CUR MEDS BY ELIG CLIN: HCPCS | Performed by: FAMILY MEDICINE

## 2021-07-16 PROCEDURE — 3017F COLORECTAL CA SCREEN DOC REV: CPT | Performed by: FAMILY MEDICINE

## 2021-07-16 PROCEDURE — 1123F ACP DISCUSS/DSCN MKR DOCD: CPT | Performed by: FAMILY MEDICINE

## 2021-07-16 RX ORDER — CEPHALEXIN 500 MG/1
500 CAPSULE ORAL 3 TIMES DAILY
Qty: 21 CAPSULE | Refills: 0 | Status: SHIPPED | OUTPATIENT
Start: 2021-07-16 | End: 2021-07-23

## 2021-07-16 NOTE — PROGRESS NOTES
[address] [place] [address] [address], [address], [place]  Phone:  [phone]  JZY:[phone]       Name: Arthea Boeck  : 1955    Chief Complaint   Patient presents with    Abdominal Pain    Dysuria       HPI:     Arthea Boeck is a 77 y.o. female who presents today for     HPI  Patient with history of constipation issues presents stating that for the last 3 days she has had dysuria and frequency. She has not seen any blood or mucus. Denies vaginal discharge. No fever or chills. No back pain. However her whole belly feels a little off. I had seen her somewhat recently for bowel trouble. This is improved but she continues to struggle with emptying her bowels. MiraLAX has helped. She is trying to change her diet and improve her fluid intake. She asked when it might be appropriate to do magnesium citrate. There are times when she has not had a bowel movement for 3 days. Current Outpatient Medications:     cephALEXin (KEFLEX) 500 MG capsule, Take 1 capsule by mouth 3 times daily for 7 days, Disp: 21 capsule, Rfl: 0    chlordiazePOXIDE-clidinium (LIBRAX) 5-2.5 MG per capsule, Take 1 capsule by mouth 2 times daily.  TAKE 1 CAPSULE TWICE A DAY AS NEEDED FOR PAIN, Disp: 180 capsule, Rfl: 0    buPROPion (WELLBUTRIN SR) 200 MG extended release tablet, Take 1 tablet by mouth 2 times daily TAKE 1 TABLET TWICE A DAY, Disp: 60 tablet, Rfl: 1    traZODone (DESYREL) 50 MG tablet, TAKE 1 TABLET NIGHTLY, Disp: 90 tablet, Rfl: 1    divalproex (DEPAKOTE ER) 250 MG extended release tablet, TAKE 1 TABLET BY MOUTH DAILY, Disp: , Rfl:     bisacodyl (DULCOLAX) 5 MG EC tablet, Take 5 mg by mouth daily as needed for Constipation, Disp: , Rfl:     calcium-cholecalciferol ([employer] 500 + D) 500-200 MG-UNIT per tablet, Take 1 tablet by mouth 2 times daily, Disp: 360 tablet, Rfl: 1    amLODIPine (NORVASC) 5 MG tablet, TAKE 1 TABLET BY MOUTH DAILY, Disp: 90 tablet, Rfl: 3    simethicone (MI-ACID Gastrointestinal: Positive for abdominal pain and constipation. Genitourinary: Positive for dysuria and frequency. Negative for difficulty urinating and hematuria. Psychiatric/Behavioral: The patient is nervous/anxious. Objective:     /78 (Site: Left Upper Arm, Position: Sitting, Cuff Size: Medium Adult)   Pulse 80   Temp 97.1 °F (36.2 °C) (Skin)   Ht 5' (1.524 m)   Wt 125 lb (56.7 kg)   SpO2 98%   BMI 24.41 kg/m²   Physical Exam  Constitutional:       General: She is not in acute distress. Appearance: Normal appearance. She is not ill-appearing. Cardiovascular:      Rate and Rhythm: Normal rate and regular rhythm. Heart sounds: No murmur heard. Pulmonary:      Effort: Pulmonary effort is normal. No respiratory distress. Breath sounds: Normal breath sounds. No wheezing. Abdominal:      Palpations: Abdomen is soft. Tenderness: There is abdominal tenderness (mild diffuse). Musculoskeletal:         General: No swelling. Neurological:      Mental Status: She is alert and oriented to person, place, and time. Psychiatric:      Comments: Anxious         UA: + nitrites      Assessment/Plan: Anand Elizabeth was seen today for abdominal pain and dysuria. Diagnoses and all orders for this visit:    Dysuria  -     POCT Urinalysis No Micro (Auto)  -     Culture, Urine  -     cephALEXin (KEFLEX) 500 MG capsule; Take 1 capsule by mouth 3 times daily for 7 days    Generalized abdominal pain    Constipation, unspecified constipation type    Will start antibiotic therapy. Will follow culture. Encourage her to push fluids and continue the MiraLAX which can be done once a day but if needed twice a day. Magnesium citrate can be used for short-term relief of constipation and in the setting of 3 days without a bowel movement and failed other therapies it is fine to use.     Patient mentions that Dr. Aida Calix let her know of myself and Dr. Yesica Santiago as options for her to receive her care in [place] as Dr. Radha Moy will be going to [facility]. She would like to follow up with me. We will consider Linzess and continued modification of diet, if not improving next visit. Return for change 10/4 appt from Dr. Radha Moy to Dr. Davida Ch . Future Appointments   Date Time Provider Jaya Chapman   8/9/2021 10:00 AM Thelma Barron PhD N SRPXPsychl [facility]   9/20/2021 10:00 AM Thelma Barron, PhD N [id number] [facility] [address] [facility]   10/4/2021  4:00 PM Katrina Hernández MD SRPX [place] [address]   10/11/2021 10:00 AM Thelma Barron, PhD N [id number] [facility]   10/29/2021 12:00 PM Kaylene Rodriguez MD N SRPX Heart [facility] [address] [place] [address]   11/11/2021 10:00 AM Mel Berger MD N [facility]   11/15/2021 10:00 AM Thelma Barron, PhD N [id number] [facility]   12/13/2021 10:00 AM Thelma Barron PhD N SRPXPsychl [address] [place] [address] Road          This office note has been dictated. Effort was made to review for errors but some may have been missed. Please contact [place] of note for clarification if needed.        Electronically signed by Katrina Hernández MD on 7/21/2021 at 5:06 AM

## 2021-07-16 NOTE — TELEPHONE ENCOUNTER
Received call from Delroy at [facility] with Red Flag Complaint. Brief description of triage: odor to urine, dark urine, low pelvic pain, hx UTI and stage 4 kidney failure. Triage indicates for patient to see today    Care advice provided, patient verbalizes understanding; denies any other questions or concerns; instructed to call back for any new or worsening symptoms. Writer provided warm transfer to Sergey Gonsalez at [facility] for appointment scheduling. Attention Provider: Thank you for allowing me to participate in the care of your patient. The patient was connected to triage in response to information provided to the E[facility]. Please do not respond through this encounter as the response is not directed to a shared pool. Reason for Disposition   Bad or foul-smelling urine    Answer Assessment - Initial Assessment Questions  1. SYMPTOM: \"What's the main symptom you're concerned about? \" (e.g., frequency, incontinence)      Low abdominal pain, stage 4 kidney failure, feels she has UTI, because she gets dark urine, odor to urine which she has now    2. ONSET: \"When did the  *No Answer*  start? \"      3 days ago and getting worse    3. PAIN: \"Is there any pain? \" If so, ask: \"How bad is it? \" (Scale: 1-10; mild, moderate, severe)      Pain in pelvis 8/10    4. CAUSE: \"What do you think is causing the symptoms? \"      She thinks UTI    5. OTHER SYMPTOMS: \"Do you have any other symptoms? \" (e.g., fever, flank pain, blood in urine, pain with urination)      Denies, just the low pelvis pain, dark urine and odor to urine    6. PREGNANCY: \"Is there any chance you are pregnant? \" \"When was your last menstrual period? \"      N/a    Protocols used: [facility]

## 2021-07-18 LAB
ORGANISM: ABNORMAL
URINE CULTURE, ROUTINE: ABNORMAL

## 2021-07-21 ASSESSMENT — ENCOUNTER SYMPTOMS
SHORTNESS OF BREATH: 0
ABDOMINAL PAIN: 1
CONSTIPATION: 1

## 2021-07-23 ENCOUNTER — TELEPHONE (OUTPATIENT)
Dept: FAMILY MEDICINE CLINIC | Age: 66
End: 2021-07-23

## 2021-07-23 NOTE — TELEPHONE ENCOUNTER
----- Message from Nohelia Castro sent at 7/23/2021 10:44 AM EDT -----  Subject: Appointment Request    Reason for Call: Urgent Eye Problem    QUESTIONS  Type of Appointment? Established Patient  Reason for appointment request? No appointments available during search  Additional Information for Provider? Itching and pain in both eyes. ---------------------------------------------------------------------------  --------------  Renay STONE  What is the best way for the office to contact you? OK to leave message on   voicemail  Preferred Call Back Phone Number? [phone]  ---------------------------------------------------------------------------  --------------  SCRIPT ANSWERS  Relationship to Patient? Self  Appointment reason? Symptomatic  Select script based on patient symptoms? Adult Eye Problem [Pink Eye,   Conjunctivitis, Glaucoma, Macular Degeneration]  Have you had an injury or trauma? No  Have you had sudden loss of vision? No  Are you having eye pain? Yes  Have you been diagnosed with, awaiting test results for, or told that you   are suspected of having COVID-19 (Coronavirus)? (If patient has tested   negative or was tested as a requirement for work, school, or travel and   not based on symptoms, answer no)? No  Do you currently have flu-like symptoms including fever or chills, cough,   shortness of breath, difficulty breathing, or new loss of taste or smell? No  Have you had close contact with someone with COVID-19 in the last 14 days? No  (Service Expert  click yes below to proceed with [employer] As Usual   Scheduling)?  Yes

## 2021-07-23 NOTE — TELEPHONE ENCOUNTER
[facility] regarding her itching and burning eyes and she is more concerned about the diarrhea she has had today.  Advised her  to increase water intake and follow BRAT diet if no better to go to urgent care, she verbalizes understanding

## 2021-08-09 ENCOUNTER — PATIENT MESSAGE (OUTPATIENT)
Dept: FAMILY MEDICINE CLINIC | Age: 66
End: 2021-08-09

## 2021-08-09 ENCOUNTER — OFFICE VISIT (OUTPATIENT)
Dept: PSYCHOLOGY | Age: 66
End: 2021-08-09
Payer: MEDICARE

## 2021-08-09 DIAGNOSIS — F33.0 MILD EPISODE OF RECURRENT MAJOR DEPRESSIVE DISORDER (HCC): Primary | ICD-10-CM

## 2021-08-09 PROCEDURE — 1036F TOBACCO NON-USER: CPT | Performed by: PSYCHOLOGIST

## 2021-08-09 PROCEDURE — 90834 PSYTX W PT 45 MINUTES: CPT | Performed by: PSYCHOLOGIST

## 2021-08-09 RX ORDER — BUPROPION HYDROCHLORIDE 200 MG/1
200 TABLET, EXTENDED RELEASE ORAL 2 TIMES DAILY
Qty: 60 TABLET | Refills: 1 | Status: SHIPPED | OUTPATIENT
Start: 2021-08-09 | End: 2021-10-04 | Stop reason: SDUPTHER

## 2021-08-09 NOTE — PROGRESS NOTES
[address] [address]  [address]  [address]  Dept: [phone]  Dept Fax: [phone]: [phone]      Patient: Armani Genao  Visit Date: 8/9/2021  Referring Provider:   No ref. provider found    SUBJECTIVE DATA     CHIEF COMPLAINT:    Chief Complaint   Patient presents with    Anxiety     Patient update: This session was conducted as a face-to-face meeting, per patient's request, with appropriate social distancing and both patient and psychologist wearing masks. Patient reports   · Feeling numb, can't cry despite losing so many people, five in one month  · We talked about her grief and also about how meds can prevent one from being able to cry  · Lots of neck/shoulder pain, went to [facility], had imaging, was good. · I encouraged patient to reach out to massage tx and pursue self-care strategies, as well  · Continues reaching out to other depression support group members  · Continues socializing with friends, going out to eat when possible  · Emile Camarillo continues to take care of many of their friends' homes, visiting them in the hospital, doing yard work, etc.  · Appreciates how much better they are getting along, which really helps her own well-being  · Feels her stress levels are good. · Doing Lumosity every day, feels better about that, knows it helps her cognition  · Has been walking around more, making sure she wears her LifeAlert, being careful  · Plans to see Dr. Brandie Cast tomorrow for pain management  · Sleep has been good, appetite is good  · Following with Dr. José Miguel Rebolledo for her renal fxn  · Feeling good about family issues that used to worry her, more at peace  · Has been going to [employer] sometimes, also watches it online, misses it when she doesn't go  · Brain Mustard is doing so much better, doing well with his sobriety. OBJECTIVE DATA     Physical Exam:    Mental Status Evaluation:   Orientation: Alert, oriented. Mood:.  Anxious, a little subdued      Affect:  Anxious, mood congruent      Appearance:  Normal   Activity:  Normal   Gait/Posture: Halting, as per usual, shuffling, short steps, uses a walker   Speech:  Normal, talkative   Thought Process: Tangential   Thought Content: Within Normal Limits   Cognition:  Normal   Memory: Normal   Insight:  good   Judgment: Normal   Suicidal Ideations: Denies Suicidal Ideations   Homicidal Ideations: Denies Homicidal Ideations   Medication Side Effects: absent       Attention Span Normal     Screenings Completed in This Encounter:      See scanned PHQ and GABBY in Media tab                                DIAGNOSIS AND ASSESSMENT DATA     DIAGNOSIS:  See visit diagnoses. 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 Doing OK with maintenance sessions. · Anxiety continues to be an issue, with reassurance-seeking her most common coping strategy. I want her to use cognitive reframing more freely. · Focus on meaning & purpose, being there for others, which she is quite good at  · Back to good self-care    ASSESSMENT/PLAN   Follow-up:  No follow-ups on file. Homework assignment before next session:     [] Practice deep breathing 1-2 times per day for 15 minutes, as demonstrated in session, and/or:    [] Go to [employer] [facility]\" web site and begin practicing with their free meditation podcasts    [x] Track thoughts that you think feed anxiety or depression    [] Go to [employer]\" web site, open up the \"Workbooks\" tab, and select a problem from the list that best suits your needs. Review the first module. [x] Begin to track physical activity.  Even five minutes per day will be helpful.    [] Talk with your physician about whether it's OK to start fish oil (burpless) or flax oil, 1000 to 1200 mg per day    [] Most importantly, remember this guideline: \"Don't believe everything you think! \"   :)    [] Try \"Expressive Writing\" using the guidelines on the handout    1. Continue work at [employer]. Let the trainers tailor a program to your needs (once feasible)  2. Continue enjoying time with friends on a regular basis. 3. Assert yourself when it comes to [facility] and the finances. 4. Practice daily relaxation training, again. 5. Start journaling  6. Re-start yoga for multiple physical issues  7. Continue to focus on improving quality of life, for now  8. Keep your phone with you at all times, so as to be able to call for help if you fall. 9. Regular social time  10. Recognize your strengths, which include facilitating connections between other people. 11. Continue depression support group connections  12. Do more walking and social events  13. Spend more time with the friends who make you laugh  15. Continue to use the card I gave you, with the coping strategies. 15. Continue with self-care and \"Serenity Prayer\" for letting go of things you cannot control or fix. 16. Continue with Lumosity and brain challenges    Session lasted 48 minutes.     Provider Signature:  Electronically signed by David Altamirano, PhD on 8/9/2021 at 10:48 AM

## 2021-08-09 NOTE — TELEPHONE ENCOUNTER
From: Yue Mack  To: Lizy Wilson MD  Sent: 8/9/2021 12:46 PM EDT  Subject: Prescription Question    Could you send my prescription Bupropion sr 200MG tablet 12 hr. why 60 To [facility] on [address] [phone]. I only have 2 pills. Could you please pull what I allergic to. I am going to Doctor for my pain. My friend Chava Restrepo worked with me at [employer] for about 8 years! said you are taking the place of her daughter Sameera Hilario ! The new doctors name isn't on our list of doctors! She said they are excited you are going! She told me what Sameera Hilario was going to do.

## 2021-08-23 DIAGNOSIS — I63.00 CEREBRAL INFARCTION DUE TO THROMBOSIS OF PRECEREBRAL ARTERY (HCC): Chronic | ICD-10-CM

## 2021-08-24 RX ORDER — CLOPIDOGREL BISULFATE 75 MG/1
TABLET ORAL
Qty: 90 TABLET | Refills: 1 | Status: SHIPPED | OUTPATIENT
Start: 2021-08-24 | End: 2022-03-14

## 2021-09-03 DIAGNOSIS — E78.00 PURE HYPERCHOLESTEROLEMIA: ICD-10-CM

## 2021-09-03 RX ORDER — SIMVASTATIN 20 MG
TABLET ORAL
Qty: 90 TABLET | Refills: 1 | Status: SHIPPED | OUTPATIENT
Start: 2021-09-03 | End: 2022-03-07

## 2021-09-03 NOTE — TELEPHONE ENCOUNTER
Aiyana High needs refill of   Requested Prescriptions     Pending Prescriptions Disp Refills    simvastatin (ZOCOR) 20 MG tablet [Pharmacy Med Name: SIMVASTATIN 20MG TABLETS] 90 tablet 1     Sig: TAKE 1 TABLET BY MOUTH DAILY       Last Filled on:  01- #90 R-1 and sent to [facility] in [place], [place] by Dr. Caitlyn Fitzpatrick.       Last Visit Date:  01/21/2021    Next Visit Date:  10/04/2021

## 2021-09-18 ENCOUNTER — APPOINTMENT (OUTPATIENT)
Dept: CT IMAGING | Age: 66
End: 2021-09-18
Payer: MEDICARE

## 2021-09-18 ENCOUNTER — APPOINTMENT (OUTPATIENT)
Dept: GENERAL RADIOLOGY | Age: 66
End: 2021-09-18
Payer: MEDICARE

## 2021-09-18 ENCOUNTER — HOSPITAL ENCOUNTER (EMERGENCY)
Age: 66
Discharge: HOME | End: 2021-09-18
Attending: EMERGENCY MEDICINE
Payer: MEDICARE

## 2021-09-18 VITALS
TEMPERATURE: 97.9 F | HEIGHT: 60 IN | OXYGEN SATURATION: 99 % | SYSTOLIC BLOOD PRESSURE: 150 MMHG | HEART RATE: 78 BPM | DIASTOLIC BLOOD PRESSURE: 67 MMHG | RESPIRATION RATE: 18 BRPM | BODY MASS INDEX: 24.54 KG/M2 | WEIGHT: 125 LBS

## 2021-09-18 DIAGNOSIS — S09.90XA INJURY OF HEAD, INITIAL ENCOUNTER: Primary | ICD-10-CM

## 2021-09-18 DIAGNOSIS — S42.214A CLOSED NONDISPLACED FRACTURE OF SURGICAL NECK OF RIGHT HUMERUS, UNSPECIFIED FRACTURE MORPHOLOGY, INITIAL ENCOUNTER: ICD-10-CM

## 2021-09-18 LAB
ANION GAP SERPL CALCULATED.3IONS-SCNC: 10 MEQ/L (ref 8–16)
BASOPHILS # BLD: 1 %
BASOPHILS ABSOLUTE: 0.1 THOU/MM3 (ref 0–0.1)
BUN BLDV-MCNC: 33 MG/DL (ref 7–22)
CALCIUM SERPL-MCNC: 9.2 MG/DL (ref 8.5–10.5)
CHLORIDE BLD-SCNC: 108 MEQ/L (ref 98–111)
CO2: 25 MEQ/L (ref 23–33)
CREAT SERPL-MCNC: 1.7 MG/DL (ref 0.4–1.2)
EKG ATRIAL RATE: 68 BPM
EKG P AXIS: 39 DEGREES
EKG P-R INTERVAL: 144 MS
EKG Q-T INTERVAL: 404 MS
EKG QRS DURATION: 96 MS
EKG QTC CALCULATION (BAZETT): 429 MS
EKG R AXIS: 84 DEGREES
EKG T AXIS: 56 DEGREES
EKG VENTRICULAR RATE: 68 BPM
EOSINOPHIL # BLD: 2.3 %
EOSINOPHILS ABSOLUTE: 0.1 THOU/MM3 (ref 0–0.4)
ERYTHROCYTE [DISTWIDTH] IN BLOOD BY AUTOMATED COUNT: 12.6 % (ref 11.5–14.5)
ERYTHROCYTE [DISTWIDTH] IN BLOOD BY AUTOMATED COUNT: 45.3 FL (ref 35–45)
GFR SERPL CREATININE-BSD FRML MDRD: 30 ML/MIN/1.73M2
GLUCOSE BLD-MCNC: 100 MG/DL (ref 70–108)
HCT VFR BLD CALC: 36.3 % (ref 37–47)
HEMOGLOBIN: 11.5 GM/DL (ref 12–16)
IMMATURE GRANS (ABS): 0.04 THOU/MM3 (ref 0–0.07)
IMMATURE GRANULOCYTES: 0.6 %
LYMPHOCYTES # BLD: 28.8 %
LYMPHOCYTES ABSOLUTE: 1.8 THOU/MM3 (ref 1–4.8)
MCH RBC QN AUTO: 31.2 PG (ref 26–33)
MCHC RBC AUTO-ENTMCNC: 31.7 GM/DL (ref 32.2–35.5)
MCV RBC AUTO: 98.4 FL (ref 81–99)
MONOCYTES # BLD: 7.2 %
MONOCYTES ABSOLUTE: 0.4 THOU/MM3 (ref 0.4–1.3)
NUCLEATED RED BLOOD CELLS: 0 /100 WBC
OSMOLALITY CALCULATION: 292.3 MOSMOL/KG (ref 275–300)
PLATELET # BLD: 222 THOU/MM3 (ref 130–400)
PMV BLD AUTO: 10.4 FL (ref 9.4–12.4)
POTASSIUM REFLEX MAGNESIUM: 3.6 MEQ/L (ref 3.5–5.2)
RBC # BLD: 3.69 MILL/MM3 (ref 4.2–5.4)
SEG NEUTROPHILS: 60.1 %
SEGMENTED NEUTROPHILS ABSOLUTE COUNT: 3.7 THOU/MM3 (ref 1.8–7.7)
SODIUM BLD-SCNC: 143 MEQ/L (ref 135–145)
WBC # BLD: 6.2 THOU/MM3 (ref 4.8–10.8)

## 2021-09-18 PROCEDURE — 80048 BASIC METABOLIC PNL TOTAL CA: CPT

## 2021-09-18 PROCEDURE — 73030 X-RAY EXAM OF SHOULDER: CPT

## 2021-09-18 PROCEDURE — 93005 ELECTROCARDIOGRAM TRACING: CPT | Performed by: EMERGENCY MEDICINE

## 2021-09-18 PROCEDURE — 36415 COLL VENOUS BLD VENIPUNCTURE: CPT

## 2021-09-18 PROCEDURE — 73060 X-RAY EXAM OF HUMERUS: CPT

## 2021-09-18 PROCEDURE — 96374 THER/PROPH/DIAG INJ IV PUSH: CPT

## 2021-09-18 PROCEDURE — 6370000000 HC RX 637 (ALT 250 FOR IP): Performed by: EMERGENCY MEDICINE

## 2021-09-18 PROCEDURE — 70450 CT HEAD/BRAIN W/O DYE: CPT

## 2021-09-18 PROCEDURE — 6360000002 HC RX W HCPCS: Performed by: EMERGENCY MEDICINE

## 2021-09-18 PROCEDURE — 85025 COMPLETE CBC W/AUTO DIFF WBC: CPT

## 2021-09-18 PROCEDURE — 71045 X-RAY EXAM CHEST 1 VIEW: CPT

## 2021-09-18 PROCEDURE — 72125 CT NECK SPINE W/O DYE: CPT

## 2021-09-18 PROCEDURE — 99284 EMERGENCY DEPT VISIT MOD MDM: CPT

## 2021-09-18 PROCEDURE — 96375 TX/PRO/DX INJ NEW DRUG ADDON: CPT

## 2021-09-18 RX ORDER — HYDROCODONE BITARTRATE AND ACETAMINOPHEN 5; 325 MG/1; MG/1
1 TABLET ORAL EVERY 6 HOURS PRN
Qty: 12 TABLET | Refills: 0 | Status: SHIPPED | OUTPATIENT
Start: 2021-09-18 | End: 2021-09-21

## 2021-09-18 RX ORDER — HYDROCODONE BITARTRATE AND ACETAMINOPHEN 5; 325 MG/1; MG/1
1 TABLET ORAL ONCE
Status: COMPLETED | OUTPATIENT
Start: 2021-09-18 | End: 2021-09-18

## 2021-09-18 RX ORDER — MORPHINE SULFATE 4 MG/ML
4 INJECTION, SOLUTION INTRAMUSCULAR; INTRAVENOUS ONCE
Status: COMPLETED | OUTPATIENT
Start: 2021-09-18 | End: 2021-09-18

## 2021-09-18 RX ADMIN — HYDROCODONE BITARTRATE AND ACETAMINOPHEN 1 TABLET: 5; 325 TABLET ORAL at 13:21

## 2021-09-18 RX ADMIN — MORPHINE SULFATE 4 MG: 4 INJECTION, SOLUTION INTRAMUSCULAR; INTRAVENOUS at 10:41

## 2021-09-18 RX ADMIN — HYDROMORPHONE HYDROCHLORIDE 0.5 MG: 1 INJECTION, SOLUTION INTRAMUSCULAR; INTRAVENOUS; SUBCUTANEOUS at 11:37

## 2021-09-18 ASSESSMENT — ENCOUNTER SYMPTOMS
COLOR CHANGE: 0
RHINORRHEA: 0
BACK PAIN: 0
SORE THROAT: 0
EYE REDNESS: 0
CONSTIPATION: 0
VOMITING: 0
CHEST TIGHTNESS: 0
WHEEZING: 0
COUGH: 0
ABDOMINAL PAIN: 0
SHORTNESS OF BREATH: 0
DIARRHEA: 0
ABDOMINAL DISTENTION: 0
NAUSEA: 0

## 2021-09-18 ASSESSMENT — PAIN SCALES - GENERAL
PAINLEVEL_OUTOF10: 8
PAINLEVEL_OUTOF10: 10
PAINLEVEL_OUTOF10: 10
PAINLEVEL_OUTOF10: 8
PAINLEVEL_OUTOF10: 10

## 2021-09-18 ASSESSMENT — PAIN DESCRIPTION - PAIN TYPE: TYPE: ACUTE PAIN

## 2021-09-18 ASSESSMENT — PAIN DESCRIPTION - LOCATION: LOCATION: ARM;SHOULDER

## 2021-09-18 ASSESSMENT — PAIN DESCRIPTION - DESCRIPTORS: DESCRIPTORS: SHARP

## 2021-09-18 ASSESSMENT — PAIN DESCRIPTION - ORIENTATION: ORIENTATION: RIGHT
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
## 2021-09-18 NOTE — ED TRIAGE NOTES
Patient presents to the ED from home via [facility] c/c mechanical fall and right arm pain. Patient states she tripped over the dog and fell forward. Patient states she put her arms out to catch herself. Patient is unsure if she hit her head. Patient is on Plavix and Aspirin. Patient has small abrasion on nose where her glasses sit. Patient complains of headache. Upon arrival, patient has a lot of pain upon cot to bed transfer of right arm. Patient is shaking. Patient reports pain 10/10 on the pain scale.
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
## 2021-09-21 ENCOUNTER — OFFICE VISIT (OUTPATIENT)
Dept: FAMILY MEDICINE CLINIC | Age: 66
End: 2021-09-21
Payer: MEDICARE

## 2021-09-21 VITALS
HEART RATE: 76 BPM | RESPIRATION RATE: 12 BRPM | BODY MASS INDEX: 25.32 KG/M2 | DIASTOLIC BLOOD PRESSURE: 78 MMHG | WEIGHT: 129 LBS | SYSTOLIC BLOOD PRESSURE: 130 MMHG | HEIGHT: 60 IN

## 2021-09-21 DIAGNOSIS — Z01.818 PRE-OPERATIVE EXAMINATION: Primary | ICD-10-CM

## 2021-09-21 PROCEDURE — 1090F PRES/ABSN URINE INCON ASSESS: CPT | Performed by: NURSE PRACTITIONER

## 2021-09-21 PROCEDURE — 4040F PNEUMOC VAC/ADMIN/RCVD: CPT | Performed by: NURSE PRACTITIONER

## 2021-09-21 PROCEDURE — G8427 DOCREV CUR MEDS BY ELIG CLIN: HCPCS | Performed by: NURSE PRACTITIONER

## 2021-09-21 PROCEDURE — G8399 PT W/DXA RESULTS DOCUMENT: HCPCS | Performed by: NURSE PRACTITIONER

## 2021-09-21 PROCEDURE — 1036F TOBACCO NON-USER: CPT | Performed by: NURSE PRACTITIONER

## 2021-09-21 PROCEDURE — G8417 CALC BMI ABV UP PARAM F/U: HCPCS | Performed by: NURSE PRACTITIONER

## 2021-09-21 PROCEDURE — 99214 OFFICE O/P EST MOD 30 MIN: CPT | Performed by: NURSE PRACTITIONER

## 2021-09-21 PROCEDURE — 1123F ACP DISCUSS/DSCN MKR DOCD: CPT | Performed by: NURSE PRACTITIONER

## 2021-09-21 PROCEDURE — 3017F COLORECTAL CA SCREEN DOC REV: CPT | Performed by: NURSE PRACTITIONER

## 2021-09-21 ASSESSMENT — ENCOUNTER SYMPTOMS
SHORTNESS OF BREATH: 0
ABDOMINAL PAIN: 0
EYE DISCHARGE: 0
BACK PAIN: 0
WHEEZING: 0
COUGH: 0
EYE ITCHING: 0
CONSTIPATION: 0
EYE REDNESS: 0
BLOOD IN STOOL: 0
COLOR CHANGE: 0
EYE PAIN: 0
SINUS PRESSURE: 0
DIARRHEA: 0

## 2021-09-21 NOTE — PATIENT INSTRUCTIONS
Patient Education        Learning About How to Prepare for Surgery  How can you prepare before surgery? You can do some things that will help you safely prepare for surgery. · Understand exactly what surgery is planned. You should know the risks, benefits, and other options. · Tell your doctors ALL the medicines, vitamins, supplements, and herbal remedies you take. Some of these can increase the risk of bleeding. Or they may interact with anesthesia. · Follow your doctor's instructions about which medicines to take or stop before your surgery. ? You may need to stop taking some medicines a week or more before surgery. ? If you take aspirin or some other blood thinner, be sure to talk to your doctor. · Follow any other instructions your doctor gave you. · If you have an advance directive, let your doctor know, and bring a copy to the hospital.   It may include a living will and a durable power of  for health care. It lets your doctor and loved ones know your health care wishes. If you don't have one, you may want to prepare one. How can you prepare on the day of surgery? Here are some tips about what to do at home before you leave for your surgery. · If your doctor told you to take your medicines on the day of surgery, take them with only a sip of water. · Follow the instructions about when to stop eating and drinking. If you don't, your surgery may be canceled. · Follow your doctor's instructions about when to bathe or shower before your surgery. · Do not shave the surgical site yourself. · Take off all jewelry and piercings. · Take out contact lenses, if you wear them. · Have a picture ID ready to take with you. Your ID will be checked before your surgery. · Know when to call your doctor. Call your doctor if you:  ? Become ill before surgery. ? Need to reschedule. ? Have changed your mind about having the surgery. What happens before surgery?   Here are some things you can expect to happen before your surgery. · Your picture ID will be checked. · The area of your body that needs surgery is often marked to make sure there are no errors. · You will be kept comfortable and safe by your anesthesia provider. The anesthesia may make you sleep. Or it may just numb the area being worked on. What happens when you are ready to go home? Be sure you have someone drive you home. Anesthesia and pain medicine make it unsafe for you to drive. You will get instructions about recovering from your surgery. This is called a discharge plan. It will cover things like diet, wound care, follow-up care, driving, and getting back to your normal routine. Follow-up care is a key part of your treatment and safety. Be sure to make and go to all appointments, and call your doctor if you are having problems. It's also a good idea to know your test results and keep a list of the medicines you take. Where can you learn more? Go to https://Coraid.TOK.tv. org and sign in to your [place] account. Enter Q270 in the [employer] box to learn more about \"Learning About How to Prepare for Surgery. \"     If you do not have an account, please click on the \"Sign Up Now\" link. Current as of: May 27, 2020               Content Version: 12.9  © 2006-2021 Healthwise, Incorporated. Care instructions adapted under license by [facility] ([facility]). If you have questions about a medical condition or this instruction, always ask your healthcare professional. [place] [address] any warranty or liability for your use of this information. Patient Education        Humerus Fracture: Care Instructions  Your Care Instructions     Your humerus is a bone in your upper arm. It extends from your shoulder to your elbow, and it is the largest bone in your arm. This bone may break (fracture) during sports, a fall, or other accidents.  It may happen when your arm or shoulder is hit or used to protect you in a fall. Fractures can range from a small, hairline crack to a bone or bones broken into two or more pieces. Your treatment depends on how bad the break is. Your doctor may have put your arm in a cast, splint, or sling to allow it to heal or to keep it stable until you see another doctor. It may take weeks or months for your arm to heal. You can help your arm heal with some care at home. You heal best when you take good care of yourself. Eat a variety of healthy foods, and don't smoke. Follow-up care is a key part of your treatment and safety. Be sure to make and go to all appointments, and call your doctor if you are having problems. It's also a good idea to know your test results and keep a list of the medicines you take. How can you care for yourself at home? · Put ice or a cold pack on your arm for 10 to 20 minutes at a time. Try to do this every 1 to 2 hours for the next 3 days (when you are awake). Put a thin cloth between the ice and your cast or splint. Keep the cast or splint dry. If you do not have a splint or cast, use a cloth between the ice and your skin. · Follow the care instructions your doctor gives you. If you have a sling, do not take it off unless your doctor tells you to. · Be safe with medicines. Take pain medicines exactly as directed. ? If the doctor gave you a prescription medicine for pain, take it as prescribed. ? If you are not taking a prescription pain medicine, ask your doctor if you can take an over-the-counter medicine. · Your doctor may advise you to keep your arm next to your body. It may help to use a pillow to support your elbow while sitting. · Follow instructions for moving your arm and doing exercises to keep your arm strong. · Wiggle your fingers and wrist often to reduce swelling and stiffness. When should you call for help?    Call your doctor now or seek immediate medical care if:    · You have increased or severe pain in your arm.     · Your hand is cool or pale or changes color.     · You have tingling, weakness, or numbness in your hand or fingers.     · Your cast or splint feels too tight.     · You cannot move your fingers.     · The skin under your cast or splint is burning or stinging. Watch closely for changes in your health, and be sure to contact your doctor if:    · You do not get better as expected. Where can you learn more? Go to https://WebsensepeCrossCurrenteb.[employer]. org and sign in to your [employer] account. Enter Y548 in the [employer] box to learn more about \"Humerus Fracture: Care Instructions. \"     If you do not have an account, please click on the \"Sign Up Now\" link. Current as of: November 16, 2020               Content Version: 12.9  © 0920-9672 Healthwise, Incorporated. Care instructions adapted under license by [facility] ([facility]). If you have questions about a medical condition or this instruction, always ask your healthcare professional. [address] any warranty or liability for your use of this information.

## 2021-10-04 ENCOUNTER — OFFICE VISIT (OUTPATIENT)
Dept: FAMILY MEDICINE CLINIC | Age: 66
End: 2021-10-04
Payer: MEDICARE

## 2021-10-04 VITALS
OXYGEN SATURATION: 97 % | BODY MASS INDEX: 25.13 KG/M2 | SYSTOLIC BLOOD PRESSURE: 146 MMHG | TEMPERATURE: 97.4 F | DIASTOLIC BLOOD PRESSURE: 80 MMHG | HEART RATE: 78 BPM | WEIGHT: 128 LBS | HEIGHT: 60 IN

## 2021-10-04 DIAGNOSIS — F51.04 PSYCHOPHYSIOLOGICAL INSOMNIA: ICD-10-CM

## 2021-10-04 DIAGNOSIS — B35.1 ONYCHOMYCOSIS: ICD-10-CM

## 2021-10-04 DIAGNOSIS — F33.42 RECURRENT MAJOR DEPRESSIVE DISORDER, IN FULL REMISSION (HCC): Primary | ICD-10-CM

## 2021-10-04 DIAGNOSIS — F41.9 ANXIETY: ICD-10-CM

## 2021-10-04 PROCEDURE — G8417 CALC BMI ABV UP PARAM F/U: HCPCS | Performed by: FAMILY MEDICINE

## 2021-10-04 PROCEDURE — G0008 ADMIN INFLUENZA VIRUS VAC: HCPCS | Performed by: FAMILY MEDICINE

## 2021-10-04 PROCEDURE — G8427 DOCREV CUR MEDS BY ELIG CLIN: HCPCS | Performed by: FAMILY MEDICINE

## 2021-10-04 PROCEDURE — 90694 VACC AIIV4 NO PRSRV 0.5ML IM: CPT | Performed by: FAMILY MEDICINE

## 2021-10-04 PROCEDURE — 1036F TOBACCO NON-USER: CPT | Performed by: FAMILY MEDICINE

## 2021-10-04 PROCEDURE — 99213 OFFICE O/P EST LOW 20 MIN: CPT | Performed by: FAMILY MEDICINE

## 2021-10-04 PROCEDURE — G8399 PT W/DXA RESULTS DOCUMENT: HCPCS | Performed by: FAMILY MEDICINE

## 2021-10-04 PROCEDURE — G8484 FLU IMMUNIZE NO ADMIN: HCPCS | Performed by: FAMILY MEDICINE

## 2021-10-04 PROCEDURE — 3017F COLORECTAL CA SCREEN DOC REV: CPT | Performed by: FAMILY MEDICINE

## 2021-10-04 PROCEDURE — 1090F PRES/ABSN URINE INCON ASSESS: CPT | Performed by: FAMILY MEDICINE

## 2021-10-04 PROCEDURE — 4040F PNEUMOC VAC/ADMIN/RCVD: CPT | Performed by: FAMILY MEDICINE

## 2021-10-04 PROCEDURE — 1123F ACP DISCUSS/DSCN MKR DOCD: CPT | Performed by: FAMILY MEDICINE

## 2021-10-04 RX ORDER — TRAZODONE HYDROCHLORIDE 50 MG/1
TABLET ORAL
Qty: 90 TABLET | Refills: 3 | Status: SHIPPED | OUTPATIENT
Start: 2021-10-04 | End: 2022-10-12 | Stop reason: SDUPTHER

## 2021-10-04 RX ORDER — BUPROPION HYDROCHLORIDE 200 MG/1
200 TABLET, EXTENDED RELEASE ORAL 2 TIMES DAILY
Qty: 180 TABLET | Refills: 3 | Status: SHIPPED | OUTPATIENT
Start: 2021-10-04 | End: 2022-10-12 | Stop reason: SDUPTHER

## 2021-10-04 RX ORDER — CHLORDIAZEPOXIDE HYDROCHLORIDE 5 MG/1
5 CAPSULE, GELATIN COATED ORAL 2 TIMES DAILY PRN
Qty: 30 CAPSULE | Refills: 2 | Status: SHIPPED | OUTPATIENT
Start: 2021-10-04 | End: 2022-01-02

## 2021-10-04 RX ORDER — OXYCODONE HYDROCHLORIDE AND ACETAMINOPHEN 5; 325 MG/1; MG/1
TABLET ORAL
COMMUNITY
Start: 2021-09-21 | End: 2021-10-10 | Stop reason: ALTCHOICE

## 2021-10-04 NOTE — PROGRESS NOTES
Edson Joya (:  1955) is a 77 y.o. female,Established patient, here for evaluation of the following chief complaint(s):  3 Month Follow-Up and Anxiety         ASSESSMENT/PLAN:  1. Recurrent major depressive disorder, in full remission ([facility] [address]  -     buPROPion (WELLBUTRIN SR) 200 MG extended release tablet; Take 1 tablet by mouth 2 times daily, Disp-180 tablet, R-3Normal  -     traZODone (DESYREL) 50 MG tablet; TAKE 1 TABLET NIGHTLY, Disp-90 tablet, R-3PLEASE CONTACT PATIENT WHEN RX IS READY TO BE PICKED UP. Normal  -     chlordiazePOXIDE (LIBRIUM) 5 MG capsule; Take 1 capsule by mouth 2 times daily as needed for Anxiety for up to 90 days. , Disp-30 capsule, R-2Normal  2. Psychophysiological insomnia  -     traZODone (DESYREL) 50 MG tablet; TAKE 1 TABLET NIGHTLY, Disp-90 tablet, R-3PLEASE CONTACT PATIENT WHEN RX IS READY TO BE PICKED UP. Normal  3. Anxiety  -     buPROPion (WELLBUTRIN SR) 200 MG extended release tablet; Take 1 tablet by mouth 2 times daily, Disp-180 tablet, R-3Normal  -     traZODone (DESYREL) 50 MG tablet; TAKE 1 TABLET NIGHTLY, Disp-90 tablet, R-3PLEASE CONTACT PATIENT WHEN RX IS READY TO BE PICKED UP. Normal  -     chlordiazePOXIDE (LIBRIUM) 5 MG capsule; Take 1 capsule by mouth 2 times daily as needed for Anxiety for up to 90 days. , Disp-30 capsule, R-2Normal  4. Onychomycosis  -     [facility] - Tommy Sheridan DPM, Podiatry, [facility]    Overall stable but seems psychiatrically fragile  Continue medication except librax not covered. She has been using benzo's for a while. Will use librium. Return in about 3 months (around 2022). Subjective   SUBJECTIVE/OBJECTIVE:  HPI     Patient following for medications for herber depression, anxiety and insomnia. A bit better. Recovering from ortho surgery on right arm. Counseling helping. With anxiety and IBS, she was given librax. Takes BID. Has helped but can't afford librax. Has taken for a long time.     Also uses trazodone 50 mg which is effective. wellbutrin  mb BID  depakote 250 mg daily    Nails of toes are thick and yellow. She can't trim.  can't trim. She would like podiatry referral.       Review of Systems   Constitutional: Negative for fatigue and fever. Respiratory: Negative for shortness of breath. Cardiovascular: Negative for chest pain and leg swelling. Objective   Physical Exam  Constitutional:       General: She is not in acute distress. Appearance: Normal appearance. She is not ill-appearing. Cardiovascular:      Rate and Rhythm: Normal rate and regular rhythm. Heart sounds: No murmur heard. Pulmonary:      Effort: Pulmonary effort is normal. No respiratory distress. Breath sounds: Normal breath sounds. No wheezing. Musculoskeletal:         General: No swelling. Comments: Right arm in brace   Neurological:      Mental Status: She is alert and oriented to person, place, and time. Psychiatric:      Comments: Pleasant, insecure, anxious          An electronic signature was used to authenticate this note.     --Pippa Campos MD

## 2021-10-10 ASSESSMENT — ENCOUNTER SYMPTOMS: SHORTNESS OF BREATH: 0

## 2021-10-11 ENCOUNTER — OFFICE VISIT (OUTPATIENT)
Dept: PSYCHOLOGY | Age: 66
End: 2021-10-11
Payer: MEDICARE

## 2021-10-11 DIAGNOSIS — F41.1 GENERALIZED ANXIETY DISORDER: Primary | Chronic | ICD-10-CM

## 2021-10-11 PROCEDURE — 90834 PSYTX W PT 45 MINUTES: CPT | Performed by: PSYCHOLOGIST

## 2021-10-11 PROCEDURE — 1036F TOBACCO NON-USER: CPT | Performed by: PSYCHOLOGIST
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
## 2021-10-29 ENCOUNTER — OFFICE VISIT (OUTPATIENT)
Dept: CARDIOLOGY CLINIC | Age: 66
End: 2021-10-29
Payer: MEDICARE

## 2021-10-29 VITALS
HEART RATE: 86 BPM | HEIGHT: 64 IN | DIASTOLIC BLOOD PRESSURE: 79 MMHG | BODY MASS INDEX: 21.17 KG/M2 | WEIGHT: 124 LBS | SYSTOLIC BLOOD PRESSURE: 118 MMHG

## 2021-10-29 DIAGNOSIS — R94.31 ABNORMAL EKG: ICD-10-CM

## 2021-10-29 DIAGNOSIS — I10 ESSENTIAL HYPERTENSION: Primary | Chronic | ICD-10-CM

## 2021-10-29 DIAGNOSIS — E78.00 PURE HYPERCHOLESTEROLEMIA: ICD-10-CM

## 2021-10-29 PROCEDURE — 99214 OFFICE O/P EST MOD 30 MIN: CPT | Performed by: INTERNAL MEDICINE

## 2021-10-29 PROCEDURE — 1123F ACP DISCUSS/DSCN MKR DOCD: CPT | Performed by: INTERNAL MEDICINE

## 2021-10-29 PROCEDURE — G8399 PT W/DXA RESULTS DOCUMENT: HCPCS | Performed by: INTERNAL MEDICINE

## 2021-10-29 PROCEDURE — G8484 FLU IMMUNIZE NO ADMIN: HCPCS | Performed by: INTERNAL MEDICINE

## 2021-10-29 PROCEDURE — G8420 CALC BMI NORM PARAMETERS: HCPCS | Performed by: INTERNAL MEDICINE

## 2021-10-29 PROCEDURE — 1090F PRES/ABSN URINE INCON ASSESS: CPT | Performed by: INTERNAL MEDICINE

## 2021-10-29 PROCEDURE — 4040F PNEUMOC VAC/ADMIN/RCVD: CPT | Performed by: INTERNAL MEDICINE

## 2021-10-29 PROCEDURE — 3017F COLORECTAL CA SCREEN DOC REV: CPT | Performed by: INTERNAL MEDICINE

## 2021-10-29 PROCEDURE — G8427 DOCREV CUR MEDS BY ELIG CLIN: HCPCS | Performed by: INTERNAL MEDICINE

## 2021-10-29 PROCEDURE — 1036F TOBACCO NON-USER: CPT | Performed by: INTERNAL MEDICINE
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
## 2021-11-04 ENCOUNTER — HOSPITAL ENCOUNTER (OUTPATIENT)
Age: 66
Discharge: HOME | End: 2021-11-04
Payer: MEDICARE

## 2021-11-04 DIAGNOSIS — N18.4 STAGE 4 CHRONIC KIDNEY DISEASE (HCC): ICD-10-CM

## 2021-11-04 DIAGNOSIS — I10 ESSENTIAL HYPERTENSION: ICD-10-CM

## 2021-11-04 LAB
BACTERIA: ABNORMAL
BILIRUBIN URINE: NEGATIVE
BLOOD, URINE: NEGATIVE
CASTS: ABNORMAL /LPF
CASTS: ABNORMAL /LPF
CHARACTER, URINE: CLEAR
COLOR: ABNORMAL
CREATININE URINE: 145 MG/DL
CRYSTALS: ABNORMAL
EPITHELIAL CELLS, UA: ABNORMAL /HPF
ERYTHROCYTE [DISTWIDTH] IN BLOOD BY AUTOMATED COUNT: 13.1 % (ref 11.5–14.5)
ERYTHROCYTE [DISTWIDTH] IN BLOOD BY AUTOMATED COUNT: 47.8 FL (ref 35–45)
GLUCOSE, URINE: NEGATIVE MG/DL
HCT VFR BLD CALC: 35.3 % (ref 37–47)
HEMOGLOBIN: 10.8 GM/DL (ref 12–16)
KETONES, URINE: NEGATIVE
LEUKOCYTE ESTERASE, URINE: ABNORMAL
MCH RBC QN AUTO: 30.6 PG (ref 26–33)
MCHC RBC AUTO-ENTMCNC: 30.6 GM/DL (ref 32.2–35.5)
MCV RBC AUTO: 100 FL (ref 81–99)
MISCELLANEOUS LAB TEST RESULT: ABNORMAL
NITRITE, URINE: NEGATIVE
PH UA: 6 (ref 5–9)
PLATELET # BLD: 310 THOU/MM3 (ref 130–400)
PMV BLD AUTO: 10.7 FL (ref 9.4–12.4)
PROTEIN UA: NEGATIVE MG/DL
PROTEIN, URINE: 22.8 MG/DL
RBC # BLD: 3.53 MILL/MM3 (ref 4.2–5.4)
RBC URINE: ABNORMAL /HPF
RENAL EPITHELIAL, UA: ABNORMAL
SPECIFIC GRAVITY UA: 1.02 (ref 1–1.03)
UROBILINOGEN, URINE: 0.2 EU/DL (ref 0–1)
WBC # BLD: 7.7 THOU/MM3 (ref 4.8–10.8)
WBC UA: ABNORMAL /HPF
YEAST: ABNORMAL

## 2021-11-04 PROCEDURE — 81001 URINALYSIS AUTO W/SCOPE: CPT

## 2021-11-04 PROCEDURE — 82306 VITAMIN D 25 HYDROXY: CPT

## 2021-11-04 PROCEDURE — 85027 COMPLETE CBC AUTOMATED: CPT

## 2021-11-04 PROCEDURE — 36415 COLL VENOUS BLD VENIPUNCTURE: CPT

## 2021-11-04 PROCEDURE — 84156 ASSAY OF PROTEIN URINE: CPT

## 2021-11-04 PROCEDURE — 82570 ASSAY OF URINE CREATININE: CPT

## 2021-11-04 PROCEDURE — 84100 ASSAY OF PHOSPHORUS: CPT

## 2021-11-04 PROCEDURE — 80053 COMPREHEN METABOLIC PANEL: CPT

## 2021-11-05 LAB
ALBUMIN SERPL-MCNC: 4.2 G/DL (ref 3.5–5.1)
ALP BLD-CCNC: 110 U/L (ref 38–126)
ALT SERPL-CCNC: 14 U/L (ref 11–66)
ANION GAP SERPL CALCULATED.3IONS-SCNC: 13 MEQ/L (ref 8–16)
AST SERPL-CCNC: 21 U/L (ref 5–40)
BILIRUB SERPL-MCNC: 0.3 MG/DL (ref 0.3–1.2)
BUN BLDV-MCNC: 26 MG/DL (ref 7–22)
CALCIUM SERPL-MCNC: 11.2 MG/DL (ref 8.5–10.5)
CHLORIDE BLD-SCNC: 103 MEQ/L (ref 98–111)
CO2: 28 MEQ/L (ref 23–33)
CREAT SERPL-MCNC: 2 MG/DL (ref 0.4–1.2)
GFR SERPL CREATININE-BSD FRML MDRD: 25 ML/MIN/1.73M2
GLUCOSE BLD-MCNC: 84 MG/DL (ref 70–108)
PHOSPHORUS: 3.2 MG/DL (ref 2.4–4.7)
POTASSIUM SERPL-SCNC: 4.2 MEQ/L (ref 3.5–5.2)
SODIUM BLD-SCNC: 144 MEQ/L (ref 135–145)
TOTAL PROTEIN: 6.7 G/DL (ref 6.1–8)
VITAMIN D 25-HYDROXY: 42 NG/ML (ref 30–100)

## 2021-11-11 ENCOUNTER — OFFICE VISIT (OUTPATIENT)
Dept: NEPHROLOGY | Age: 66
End: 2021-11-11
Payer: MEDICARE

## 2021-11-11 VITALS
SYSTOLIC BLOOD PRESSURE: 108 MMHG | BODY MASS INDEX: 20.94 KG/M2 | TEMPERATURE: 97 F | DIASTOLIC BLOOD PRESSURE: 68 MMHG | OXYGEN SATURATION: 97 % | WEIGHT: 122 LBS | HEART RATE: 88 BPM

## 2021-11-11 DIAGNOSIS — N18.4 STAGE 4 CHRONIC KIDNEY DISEASE (HCC): ICD-10-CM

## 2021-11-11 DIAGNOSIS — I10 ESSENTIAL HYPERTENSION: Primary | ICD-10-CM

## 2021-11-11 DIAGNOSIS — E83.52 HYPERCALCEMIA: ICD-10-CM

## 2021-11-11 PROCEDURE — 1090F PRES/ABSN URINE INCON ASSESS: CPT | Performed by: INTERNAL MEDICINE

## 2021-11-11 PROCEDURE — G8428 CUR MEDS NOT DOCUMENT: HCPCS | Performed by: INTERNAL MEDICINE

## 2021-11-11 PROCEDURE — 3017F COLORECTAL CA SCREEN DOC REV: CPT | Performed by: INTERNAL MEDICINE

## 2021-11-11 PROCEDURE — 1036F TOBACCO NON-USER: CPT | Performed by: INTERNAL MEDICINE

## 2021-11-11 PROCEDURE — 1123F ACP DISCUSS/DSCN MKR DOCD: CPT | Performed by: INTERNAL MEDICINE

## 2021-11-11 PROCEDURE — G8399 PT W/DXA RESULTS DOCUMENT: HCPCS | Performed by: INTERNAL MEDICINE

## 2021-11-11 PROCEDURE — G8420 CALC BMI NORM PARAMETERS: HCPCS | Performed by: INTERNAL MEDICINE

## 2021-11-11 PROCEDURE — 4040F PNEUMOC VAC/ADMIN/RCVD: CPT | Performed by: INTERNAL MEDICINE

## 2021-11-11 PROCEDURE — G8484 FLU IMMUNIZE NO ADMIN: HCPCS | Performed by: INTERNAL MEDICINE

## 2021-11-11 PROCEDURE — 99213 OFFICE O/P EST LOW 20 MIN: CPT | Performed by: INTERNAL MEDICINE

## 2021-11-11 NOTE — PROGRESS NOTES
[facility]        Outpatient Follow-Up note         11/11/2021 10:16 AM    Patient Name:   Ana Ford  YOB: 1955  Primary Care Physician:  Max Preston MD      Chief Complaint / Reason for follow-up : Follow Up of CKD     Interval History :  Patient seen and examined by me. Feels well. No CP or SOB . Had a fall and broke the rt arm . Past History :  Past Medical History:   Diagnosis Date    Allergic rhinitis     Anxiety     CVA (cerebral infarction)     Depression     Fibromyalgia     Headache(784.0)     Hyperlipidemia     Hypertension     Irritable bowel syndrome     Kidney failure     Unspecified cerebral artery occlusion with cerebral infarction     Unspecified sleep apnea      Past Surgical History:   Procedure Laterality Date    CARPAL TUNNEL RELEASE Right     [facility]  2012    [facility]    ENDOSCOPY, COLON, DIAGNOSTIC  unsure    EYE SURGERY      lasik    HEMORRHOID SURGERY  unsure    HYSTERECTOMY      total    NECK SURGERY  18  years ago    fusion    SINUS SURGERY  10 years ago    TUBAL LIGATION          Medications :     Outpatient Medications Marked as Taking for the 11/11/21 encounter (Office Visit) with Asaf Arreola MD   Medication Sig Dispense Refill    buPROPion (WELLBUTRIN SR) 200 MG extended release tablet Take 1 tablet by mouth 2 times daily 180 tablet 3    traZODone (DESYREL) 50 MG tablet TAKE 1 TABLET NIGHTLY 90 tablet 3    chlordiazePOXIDE (LIBRIUM) 5 MG capsule Take 1 capsule by mouth 2 times daily as needed for Anxiety for up to 90 days. 30 capsule 2    simvastatin (ZOCOR) 20 MG tablet TAKE 1 TABLET BY MOUTH DAILY 90 tablet 1    clopidogrel (PLAVIX) 75 MG tablet TAKE 1 TABLET BY MOUTH DAILY 90 tablet 1    chlordiazePOXIDE-clidinium (LIBRAX) 5-2.5 MG per capsule Take 1 capsule by mouth 2 times daily.  TAKE 1 CAPSULE TWICE A DAY AS NEEDED FOR PAIN 180 capsule 0    divalproex (DEPAKOTE ER) 250 MG extended release tablet TAKE 1 TABLET BY MOUTH DAILY      bisacodyl (DULCOLAX) 5 MG EC tablet Take 5 mg by mouth daily as needed for Constipation      calcium-cholecalciferol (OYSCO 500 + D) 500-200 MG-UNIT per tablet Take 1 tablet by mouth 2 times daily 360 tablet 1    amLODIPine (NORVASC) 5 MG tablet TAKE 1 TABLET BY MOUTH DAILY 90 tablet 3    simethicone (MI-ACID GAS RELIEF) 80 MG chewable tablet Take 80 mg by mouth 2 times daily      pantoprazole (PROTONIX) 40 MG tablet Take 1 tablet by mouth daily (Patient taking differently: Take 40 mg by mouth as needed ) 90 tablet 1    triamcinolone (NASACORT ALLERGY 24HR) 55 MCG/ACT nasal inhaler 2 sprays by Each Nostril route daily As needed      Polyethylene Glycol 3350 (MIRALAX PO) Take by mouth      cetirizine (ZYRTEC ALLERGY) 10 MG tablet Take 1 tablet by mouth daily 30 tablet 11    Tetrahydrozoline-Zn Sulfate (EYE DROPS ALLERGY RELIEF OP) Apply 1 drop to eye daily      Probiotic Product (PROBIOTIC-10) CHEW Take by mouth daily      linaclotide (LINZESS) 290 MCG CAPS capsule Take 290 mcg by mouth every morning (before breakfast)       MAGNESIUM CITRATE PO Take by mouth as needed (weekly)      denosumab (PROLIA) 60 MG/ML SOLN SC injection Inject 60 mg into the skin once      folic acid (FOLVITE) 674 MCG tablet Take 400 mcg by mouth daily      b complex vitamins capsule Take 1 capsule by mouth daily 100 capsule 3    FISH OIL 1,000 mg by Does not apply route 2 times daily Indications: Decreased Energy       aspirin 81 MG tablet Take 81 mg by mouth daily.            Vitals     /68 (Site: Left Upper Arm, Position: Sitting, Cuff Size: Medium Adult)   Pulse 88   Temp 97 °F (36.1 °C) (Temporal)   Wt 122 lb (55.3 kg)   SpO2 97%   BMI 20.94 kg/m²  Wt Readings from Last 3 Encounters:   11/11/21 122 lb (55.3 kg)   10/29/21 124 lb (56.2 kg)   10/04/21 128 lb (58.1 kg)        Physical Exam     General -- no distress  Lungs -- clear  Heart -- S1, S2 heard, JVD - no  Abdomen - soft, non-tender  Extremities -- no edema  CNS - awake and alert    Labs, Radiology and Tests    Labs -      1/18 7/18 12/18 8/19 7/20 1/20 6/21 11/21       Potassium  4.8  5.1  4.2  4.1  4.3 4.0 4.1 4.2       BUN  30  34  25  29  26 26 22 26       Creatinine  1.6  1.8  1.6  1.8  1.7 1.6 2.0 2.0       eGFR  32  28  32  28  30 32 25 25                            UPCR  < 200    < 200  100 110  < 300  < 300       UMCR  13      6  13                                 12/18 - calcium 9.4 phosphorus 3.2 vitamin D 19 and PTH 83  3/9 - calcium - 9.4     Renal Ultrasound Scan -- 1/2018  Rt kidney 9 cm/ left kidney 9 cm. Hypoplastic kidneys . Elevated resistive indices . 5 mm cyst rt kidney      Other : old  lab data and PCP notes have been reviewed and noted patient's creatinine was around 1.5 in 2016 and in 2014 it was 0.7 A CT scan which was done in 2014 which was negative for any abnormalities of the kidney.     Assessment    1. Renal - as per MDRD patient's GFR is around 32 ML per minute minute. Recent worsening.   -Now GFR is running close to 25. May need progressive chronic kidney disease. Advise continue to drink more liquids   - Renal ultrasound scan shows elevated resistive indices in mild hypoplastic kidneys . 2. Essential hypertension -running well. 3. Electrolytes - WNL  4. Diabetes mellitus - not on any meds   5. History of stroke in 2013   6. Severe osteoporosis- on prolia , has high calcium. Stop vitamin D  7. Medications reviewed discussed with the patient and  in detail. 8. Follow-up in 6 months    Tests and orders placed this Encounter     Orders Placed This Encounter   Procedures    Comprehensive Metabolic Panel    PTH, Intact    Vitamin D 25 Hydroxy    CBC    Phosphorus    Basic Metabolic Panel       Leland Franco M.D  [facility].

## 2021-11-15 ENCOUNTER — OFFICE VISIT (OUTPATIENT)
Dept: PSYCHOLOGY | Age: 66
End: 2021-11-15
Payer: MEDICARE

## 2021-11-15 DIAGNOSIS — I63.00 CEREBRAL INFARCTION DUE TO THROMBOSIS OF PRECEREBRAL ARTERY (HCC): Chronic | ICD-10-CM

## 2021-11-15 DIAGNOSIS — F41.1 GENERALIZED ANXIETY DISORDER: Primary | Chronic | ICD-10-CM

## 2021-11-15 DIAGNOSIS — F33.0 MILD EPISODE OF RECURRENT MAJOR DEPRESSIVE DISORDER (HCC): ICD-10-CM

## 2021-11-15 PROCEDURE — 1036F TOBACCO NON-USER: CPT | Performed by: PSYCHOLOGIST

## 2021-11-15 PROCEDURE — 90834 PSYTX W PT 45 MINUTES: CPT | Performed by: PSYCHOLOGIST

## 2021-11-15 NOTE — PROGRESS NOTES
[address] [address]  [address]  [place] [address]  Dept: [phone]  Dept Fax: [phone]: [phone]      Patient: Prashanth Thompson  Visit Date: 11/15/2021  Referring Provider:   No ref. provider found    SUBJECTIVE DATA     CHIEF COMPLAINT:    Chief Complaint   Patient presents with    Anxiety    Depression    Stress     Patient update: This session was conducted as a face-to-face meeting, per patient's request, with appropriate social distancing and both patient and psychologist wearing masks. Patient reports   · Making progress on arm therapies for broken humerus, still has a lot of pain  · Geryl Mode had been super helpful, but of late seems to be getting more irritable and less willing to help, as time wears on  · Been having problems with chronic constipation, called GI doc again  · Has some mild depression, but attributes it to having 3 months of not being able to use her right arm  · Feels her memory is still poor, having trouble remembering names of PT and OT who are working with her  · Continues staying in touch with depression support group members, but feeling that the group has fulfilled its purpose  · Offering to be a support to others if they want to start a new support group, but some group members have , some are off living their lives, and one gets together with her on a regular basis  · Difficulty setting boundaries on friend Kodak Davies, who tends to need things from Geryl Mode all the time  · Sleep is good  · Appetite is quite good, a bit worse because of the constipation  · Feeling good about family members who are thriving right now    OBJECTIVE DATA     Physical Exam:    Mental Status Evaluation:   Orientation: Alert, oriented. Mood:.  Anxious      Affect:  Anxious, mood congruent      Appearance:  Normal   Activity:  Normal   Gait/Posture: Halting, as per usual, shuffling, short steps   Speech:  Normal, talkative   Thought Process: Tangential   Thought Content: Within Normal Limits   Cognition:  Normal   Memory: Normal   Insight:  good   Judgment: Normal   Suicidal Ideations: Denies Suicidal Ideations   Homicidal Ideations: Denies Homicidal Ideations   Medication Side Effects: absent       Attention Span Normal     Screenings Completed in This Encounter:      See scanned PHQ and GABBY in Media tab                                DIAGNOSIS AND ASSESSMENT DATA     DIAGNOSIS:  See visit diagnoses. 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 Doing OK with maintenance sessions. · Anxiety continues to be an issue, with reassurance-seeking her most common coping strategy. I want her to use cognitive reframing more freely. · Focus on meaning & purpose, being there for others, which she is quite good at  · Back to good self-care  · Dealing OK with sequelae of broken arm    ASSESSMENT/PLAN   Follow-up:  Return in about 2 months (around 1/15/2022). Homework assignment before next session:     [] Practice deep breathing 1-2 times per day for 15 minutes, as demonstrated in session, and/or:    [] Go to [employer] [facility]\" web site and begin practicing with their free meditation podcasts    [x] Track thoughts that you think feed anxiety or depression    [] Go to [employer]\" web site, open up the \"Workbooks\" tab, and select a problem from the list that best suits your needs. Review the first module. [x] Begin to track physical activity. Even five minutes per day will be helpful.    [] Talk with your physician about whether it's OK to start fish oil (burpless) or flax oil, 1000 to 1200 mg per day    [] Most importantly, remember this guideline: \"Don't believe everything you think! \"   :)    [] Try \"Expressive Writing\" using the guidelines on the handout    1.  Continue work at [employer]. Let the trainers tailor a program to your needs (once feasible)  2. Continue enjoying time with friends on a regular basis. 3. Assert yourself when it comes to [employer] and the finances. 4. Practice daily relaxation training, again. 5. Start journaling  6. Re-start yoga for multiple physical issues  7. Continue to focus on improving quality of life, for now  8. Keep your phone with you at all times, so as to be able to call for help if you fall. 9. Regular social time  10. Recognize your strengths, which include facilitating connections between other people. 11. Continue depression support group connections  12. Do more walking and social events  13. Spend more time with the friends who make you laugh  15. Continue to use the card I gave you, with the coping strategies. 15. Continue with self-care and \"Serenity Prayer\" for letting go of things you cannot control or fix. 16. Continue with Lumosity and brain challenges  17. Get a driving simulation test before you resume driving    Session lasted 46 minutes.     Provider Signature:  Electronically signed by Alexis Weaver, PhD on 11/15/2021 at 10:44 AM

## 2021-12-09 DIAGNOSIS — K58.9 IRRITABLE BOWEL SYNDROME WITHOUT DIARRHEA: ICD-10-CM

## 2021-12-09 RX ORDER — CHLORDIAZEPOXIDE HYDROCHLORIDE AND CLIDINIUM BROMIDE 5; 2.5 MG/1; MG/1
CAPSULE ORAL
Qty: 180 CAPSULE | Refills: 0 | Status: SHIPPED | OUTPATIENT
Start: 2021-12-09 | End: 2022-01-12 | Stop reason: SDUPTHER

## 2021-12-09 NOTE — TELEPHONE ENCOUNTER
Meghana Sotelo called requesting a refill on the following medications:  Requested Prescriptions     Pending Prescriptions Disp Refills    chlordiazePOXIDE-clidinium (LIBRAX) 5-2.5 MG per capsule 180 capsule 0     Sig: Take 1 capsule by mouth 2 times daily.  TAKE 1 CAPSULE TWICE A DAY AS NEEDED FOR PAIN       Date of last visit: 10/4/2021  Date of next visit (if applicable):Visit date not found  Date of last refill: 06/11/21  Pharmacy Name: Angel Hagan LPN

## 2021-12-17 ENCOUNTER — TELEPHONE (OUTPATIENT)
Dept: CARDIOLOGY CLINIC | Age: 66
End: 2021-12-17

## 2021-12-17 NOTE — TELEPHONE ENCOUNTER
May proceed for egd- low risk  May cont asa 81  May hold plavix for 5 days  Peewee Carbajal  is on asa and plavix for Hx of CVA.  If the GI want to stop asa as well need to get okay from neurologist of the pat

## 2021-12-17 NOTE — TELEPHONE ENCOUNTER
Pre op Risk Assessment    Procedure EGD  Physician DR Mary Arroyo  Date of surgery/procedure 12-27-21    Last OV 10-29-21  Last Stress 9-15-20  Last Echo 9-14-20  Last Cath NONE IN EPIC  Last Stent NONE IN EPIC  Is patient on blood thinners PLAVIX & ASA  Hold Meds/how many days 5 DAYS? ?

## 2022-01-03 ENCOUNTER — TELEPHONE (OUTPATIENT)
Dept: NEPHROLOGY | Age: 67
End: 2022-01-03

## 2022-01-03 NOTE — TELEPHONE ENCOUNTER
Patient called into the office. Complaints of loosing weight since last visit. On 11/11 she weighed 122#. Weight today of 116#. Was supposed to have upper GI today but it is rescheduled on the 14th. She will call PCP to notify of weight change.

## 2022-01-06 RX ORDER — AMLODIPINE BESYLATE 5 MG/1
5 TABLET ORAL DAILY
Qty: 90 TABLET | Refills: 3 | Status: SHIPPED | OUTPATIENT
Start: 2022-01-06

## 2022-01-12 ENCOUNTER — OFFICE VISIT (OUTPATIENT)
Dept: FAMILY MEDICINE CLINIC | Age: 67
End: 2022-01-12
Payer: MEDICARE

## 2022-01-12 VITALS
DIASTOLIC BLOOD PRESSURE: 74 MMHG | HEART RATE: 68 BPM | OXYGEN SATURATION: 97 % | SYSTOLIC BLOOD PRESSURE: 142 MMHG | WEIGHT: 122 LBS | BODY MASS INDEX: 20.83 KG/M2 | HEIGHT: 64 IN | TEMPERATURE: 97.3 F

## 2022-01-12 DIAGNOSIS — N18.4 STAGE 4 CHRONIC KIDNEY DISEASE (HCC): ICD-10-CM

## 2022-01-12 DIAGNOSIS — F33.0 MILD EPISODE OF RECURRENT MAJOR DEPRESSIVE DISORDER (HCC): ICD-10-CM

## 2022-01-12 DIAGNOSIS — Z12.31 ENCOUNTER FOR SCREENING MAMMOGRAM FOR MALIGNANT NEOPLASM OF BREAST: ICD-10-CM

## 2022-01-12 DIAGNOSIS — K58.9 IRRITABLE BOWEL SYNDROME WITHOUT DIARRHEA: ICD-10-CM

## 2022-01-12 DIAGNOSIS — I10 ESSENTIAL HYPERTENSION: Primary | Chronic | ICD-10-CM

## 2022-01-12 PROCEDURE — 4040F PNEUMOC VAC/ADMIN/RCVD: CPT | Performed by: FAMILY MEDICINE

## 2022-01-12 PROCEDURE — 99214 OFFICE O/P EST MOD 30 MIN: CPT | Performed by: FAMILY MEDICINE

## 2022-01-12 PROCEDURE — G8427 DOCREV CUR MEDS BY ELIG CLIN: HCPCS | Performed by: FAMILY MEDICINE

## 2022-01-12 PROCEDURE — G8484 FLU IMMUNIZE NO ADMIN: HCPCS | Performed by: FAMILY MEDICINE

## 2022-01-12 PROCEDURE — G8399 PT W/DXA RESULTS DOCUMENT: HCPCS | Performed by: FAMILY MEDICINE

## 2022-01-12 PROCEDURE — 1036F TOBACCO NON-USER: CPT | Performed by: FAMILY MEDICINE

## 2022-01-12 PROCEDURE — G8420 CALC BMI NORM PARAMETERS: HCPCS | Performed by: FAMILY MEDICINE

## 2022-01-12 PROCEDURE — 1090F PRES/ABSN URINE INCON ASSESS: CPT | Performed by: FAMILY MEDICINE

## 2022-01-12 PROCEDURE — 1123F ACP DISCUSS/DSCN MKR DOCD: CPT | Performed by: FAMILY MEDICINE

## 2022-01-12 PROCEDURE — 3017F COLORECTAL CA SCREEN DOC REV: CPT | Performed by: FAMILY MEDICINE

## 2022-01-12 RX ORDER — CHLORDIAZEPOXIDE HYDROCHLORIDE AND CLIDINIUM BROMIDE 5; 2.5 MG/1; MG/1
CAPSULE ORAL
Qty: 180 CAPSULE | Refills: 3 | Status: SHIPPED | OUTPATIENT
Start: 2022-01-12

## 2022-01-12 SDOH — ECONOMIC STABILITY: FOOD INSECURITY: WITHIN THE PAST 12 MONTHS, YOU WORRIED THAT YOUR FOOD WOULD RUN OUT BEFORE YOU GOT MONEY TO BUY MORE.: NEVER TRUE

## 2022-01-12 SDOH — ECONOMIC STABILITY: FOOD INSECURITY: WITHIN THE PAST 12 MONTHS, THE FOOD YOU BOUGHT JUST DIDN'T LAST AND YOU DIDN'T HAVE MONEY TO GET MORE.: NEVER TRUE

## 2022-01-12 ASSESSMENT — SOCIAL DETERMINANTS OF HEALTH (SDOH): HOW HARD IS IT FOR YOU TO PAY FOR THE VERY BASICS LIKE FOOD, HOUSING, MEDICAL CARE, AND HEATING?: NOT HARD AT ALL

## 2022-01-12 ASSESSMENT — ENCOUNTER SYMPTOMS: SHORTNESS OF BREATH: 0

## 2022-01-12 NOTE — PROGRESS NOTES
Araceli Burns (:  1955) is a 74 y.o. female,Established patient, here for evaluation of the following chief complaint(s):  3 Month Follow-Up         ASSESSMENT/PLAN:  1. Essential hypertension  2. Irritable bowel syndrome without diarrhea  -     chlordiazePOXIDE-clidinium (LIBRAX) 5-2.5 MG per capsule; TAKE 1 CAPSULE TWICE A DAY AS NEEDED FOR PAIN, Disp-180 capsule, R-3Normal  3. Mild episode of recurrent major depressive disorder (HCC)  4. Stage 4 chronic kidney disease ([facility] [address]  5. Encounter for screening mammogram for malignant neoplasm of breast  -     ROXY DIGITAL SCREEN W OR WO CAD BILATERAL; Future    She is looking and feeling much better. Continue current regimen. Add back linzess  Will add magnesium citrate 1/3 to 1/2 bottle x 1  And repeat in in 6-8 hours if needed. Follow BP -- borderline elevated here    Return in about 3 months (around 2022). Subjective   SUBJECTIVE/OBJECTIVE:  HPI    IBS -- usually okay but last few days, more constipated. Follows with GI, doing miralax + probiotic + linzess   Prn: simethicone, magnesium + bisacodyl _+ librax. She requests refill of librax  So far: 5 botles of water with miralax QID, doing warm fluid, tried caffeine. Oats + berries. Had been off linzess. depresion --  Doing okay, better since she can be more independent with her arm  HTN -- on norvasc  CKD -- follows with kidney function    Right arm recovery -- healing still. Lifting limit, but doing exercises daily. Wt Readings from Last 3 Encounters:   22 122 lb (55.3 kg)   21 122 lb (55.3 kg)   10/29/21 124 lb (56.2 kg)         Review of Systems   Constitutional: Negative for fatigue and fever. Respiratory: Negative for shortness of breath. Cardiovascular: Negative for chest pain and leg swelling. Objective   Physical Exam  Constitutional:       General: She is not in acute distress. Appearance: Normal appearance. She is not ill-appearing. Cardiovascular:      Rate and Rhythm: Normal rate and regular rhythm. Heart sounds: No murmur heard. Pulmonary:      Effort: Pulmonary effort is normal. No respiratory distress. Breath sounds: Normal breath sounds. No wheezing. Musculoskeletal:         General: No swelling. Neurological:      Mental Status: She is alert and oriented to person, place, and time. Psychiatric:         Mood and Affect: Mood normal.           Lab Results   Component Value Date    [id number] 03/08/2021       Lab Results   Component Value Date     11/04/2021    K 4.2 11/04/2021    K 3.6 09/18/2021     11/04/2021    CO2 28 11/04/2021    BUN 26 11/04/2021    CREATININE 2.0 11/04/2021    GLUCOSE 84 11/04/2021    GLUCOSE 81 01/18/2017    CALCIUM 11.2 11/04/2021      An electronic signature was used to authenticate this note.     --Linda Mak MD

## 2022-02-14 ENCOUNTER — OFFICE VISIT (OUTPATIENT)
Dept: PSYCHOLOGY | Age: 67
End: 2022-02-14
Payer: MEDICARE

## 2022-02-14 ENCOUNTER — HOSPITAL ENCOUNTER (OUTPATIENT)
Age: 67
Discharge: HOME | End: 2022-02-14
Payer: MEDICARE

## 2022-02-14 DIAGNOSIS — E83.52 HYPERCALCEMIA: ICD-10-CM

## 2022-02-14 DIAGNOSIS — F41.1 GENERALIZED ANXIETY DISORDER: Primary | Chronic | ICD-10-CM

## 2022-02-14 DIAGNOSIS — I10 ESSENTIAL HYPERTENSION: ICD-10-CM

## 2022-02-14 DIAGNOSIS — N18.4 STAGE 4 CHRONIC KIDNEY DISEASE (HCC): ICD-10-CM

## 2022-02-14 LAB
ERYTHROCYTE [DISTWIDTH] IN BLOOD BY AUTOMATED COUNT: 13.2 % (ref 11.5–14.5)
ERYTHROCYTE [DISTWIDTH] IN BLOOD BY AUTOMATED COUNT: 48.9 FL (ref 35–45)
HCT VFR BLD CALC: 35.3 % (ref 37–47)
HEMOGLOBIN: 10.8 GM/DL (ref 12–16)
MCH RBC QN AUTO: 30.7 PG (ref 26–33)
MCHC RBC AUTO-ENTMCNC: 30.6 GM/DL (ref 32.2–35.5)
MCV RBC AUTO: 100.3 FL (ref 81–99)
PLATELET # BLD: 218 THOU/MM3 (ref 130–400)
PMV BLD AUTO: 11.2 FL (ref 9.4–12.4)
RBC # BLD: 3.52 MILL/MM3 (ref 4.2–5.4)
WBC # BLD: 6.4 THOU/MM3 (ref 4.8–10.8)

## 2022-02-14 PROCEDURE — 80053 COMPREHEN METABOLIC PANEL: CPT

## 2022-02-14 PROCEDURE — 82306 VITAMIN D 25 HYDROXY: CPT

## 2022-02-14 PROCEDURE — 84100 ASSAY OF PHOSPHORUS: CPT

## 2022-02-14 PROCEDURE — 85027 COMPLETE CBC AUTOMATED: CPT

## 2022-02-14 PROCEDURE — 90834 PSYTX W PT 45 MINUTES: CPT | Performed by: PSYCHOLOGIST

## 2022-02-14 PROCEDURE — 83970 ASSAY OF PARATHORMONE: CPT

## 2022-02-14 PROCEDURE — 1036F TOBACCO NON-USER: CPT | Performed by: PSYCHOLOGIST

## 2022-02-14 PROCEDURE — 36415 COLL VENOUS BLD VENIPUNCTURE: CPT

## 2022-02-14 NOTE — PROGRESS NOTES
[address] [facility] PSYCHOLOGY  [address] [address]  [address]  [address]  Dept: [phone]  Dept Fax: [phone]: [phone]      Patient: Liza Lopez  Visit Date: 2/14/2022  Referring Provider:   No ref. provider found    SUBJECTIVE DATA     CHIEF COMPLAINT:    No chief complaint on file. Patient update: This session was conducted as a face-to-face meeting, per patient's request, with appropriate social distancing and both patient and psychologist wearing masks. Patient reports   · Daughter Mukul Crowe and her  Junior Mcgraw are getting . Junior Mcgraw has anger issues, calling their son [employer] They plan to do shared parenting  · Mukul Crowe has moved in with patient and Valentin Boogie, will have Douglas JonesAlvarado with them every other day  · They are meeting with an  this week  · She has been crying a lot  · Patient's brother Mookie Garcia has continued to be very needy, marginal, may be getting kicked out of [facility]  · Thelma Cancer has confronted him, but to no avail  · Luciano Edward has been super depending on Valentin Reyess, as has Mookie Garcia  · Ruthann Herring also compulsively buys  · Having some angina, concerned about that  · Needs to restart her PT for arm pain  · Mostly sleep is good, appetite is good. Down to 115#. Started drinking Ensure, doing better since then  · Has been doing better with  Valentin Boogie, getting along OK these days    OBJECTIVE DATA     Physical Exam:    Mental Status Evaluation:   Orientation: Alert, oriented. Mood:. Anxious      Affect:  Anxious, mood congruent      Appearance:  Normal   Activity:  Normal   Gait/Posture: Halting, as per usual, shuffling, short steps   Speech:  Normal, talkative   Thought Process: Tangential   Thought Content:   Within Normal Limits   Cognition:  Normal   Memory: Normal   Insight:  good   Judgment: Normal   Suicidal Ideations: Denies Suicidal Ideations   Homicidal Ideations: Denies Homicidal Ideations   Medication Side Effects: absent       Attention Span Normal     Screenings Completed in This Encounter:      See scanned PHQ and GABBY in Media tab                                DIAGNOSIS AND ASSESSMENT DATA     DIAGNOSIS:  See visit diagnoses. 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 Doing OK with maintenance sessions. · Anxiety continues to be an issue, with reassurance-seeking her most common coping strategy. I want her to use cognitive reframing more freely. · Focus on meaning & purpose, being there for others, which she is quite good at  · Back to good self-care  · Better use of boundaries    ASSESSMENT/PLAN   Follow-up:  Return in about 4 weeks (around 3/14/2022). Homework assignment before next session:     [] Practice deep breathing 1-2 times per day for 15 minutes, as demonstrated in session, and/or:    [] Go to [facility]\" web site and begin practicing with their free meditation podcasts    [x] Track thoughts that you think feed anxiety or depression    [] Go to [employer]\" web site, open up the \"Workbooks\" tab, and select a problem from the list that best suits your needs. Review the first module. [x] Begin to track physical activity. Even five minutes per day will be helpful.    [] Talk with your physician about whether it's OK to start fish oil (burpless) or flax oil, 1000 to 1200 mg per day    [] Most importantly, remember this guideline: \"Don't believe everything you think! \"   :)    [] Try \"Expressive Writing\" using the guidelines on the handout    1. Continue work at [employer]. Let the trainers tailor a program to your needs (once feasible)  2. Continue enjoying time with friends on a regular basis. 3. Assert yourself when it comes to [facility] and the finances. 4. Practice daily relaxation training, again. 5. Start journaling  6.  Re-start yoga for multiple physical issues  7. Continue to focus on improving quality of life, for now  8. Keep your phone with you at all times, so as to be able to call for help if you fall. 9. Regular social time  10. Recognize your strengths, which include facilitating connections between other people. 11. Continue depression support group connections  12. Do more walking and social events  13. Spend more time with the friends who make you laugh  15. Continue to use the card I gave you, with the coping strategies. 15. Continue with self-care and \"Serenity Prayer\" for letting go of things you cannot control or fix. 16. Continue with Lumosity and brain challenges  17. Resume PT exercises  18. See AV for specifics    Session lasted 47 minutes.     Provider Signature:  Electronically signed by Marquita Oliveira, PhD on 2/14/2022 at 9:48 AM

## 2022-02-14 NOTE — PATIENT INSTRUCTIONS
Call Dr. Abdi Oak[place] office about angina pain  OK to set limits on Micaela's demands  Re-start PT exercises

## 2022-02-15 ENCOUNTER — OFFICE VISIT (OUTPATIENT)
Dept: CARDIOLOGY CLINIC | Age: 67
End: 2022-02-15
Payer: MEDICARE

## 2022-02-15 VITALS
BODY MASS INDEX: 23.91 KG/M2 | SYSTOLIC BLOOD PRESSURE: 137 MMHG | DIASTOLIC BLOOD PRESSURE: 74 MMHG | HEART RATE: 68 BPM | WEIGHT: 121.8 LBS | HEIGHT: 60 IN

## 2022-02-15 DIAGNOSIS — E78.00 PURE HYPERCHOLESTEROLEMIA: ICD-10-CM

## 2022-02-15 DIAGNOSIS — I10 ESSENTIAL HYPERTENSION: ICD-10-CM

## 2022-02-15 DIAGNOSIS — R07.89 CHEST PAIN, ATYPICAL: Primary | ICD-10-CM

## 2022-02-15 DIAGNOSIS — R06.02 SOB (SHORTNESS OF BREATH) ON EXERTION: ICD-10-CM

## 2022-02-15 LAB
ALBUMIN SERPL-MCNC: 4.5 G/DL (ref 3.5–5.1)
ALP BLD-CCNC: 96 U/L (ref 38–126)
ALT SERPL-CCNC: 15 U/L (ref 11–66)
ANION GAP SERPL CALCULATED.3IONS-SCNC: 15 MEQ/L (ref 8–16)
AST SERPL-CCNC: 20 U/L (ref 5–40)
BILIRUB SERPL-MCNC: 0.2 MG/DL (ref 0.3–1.2)
BUN BLDV-MCNC: 26 MG/DL (ref 7–22)
CALCIUM SERPL-MCNC: 9.4 MG/DL (ref 8.5–10.5)
CHLORIDE BLD-SCNC: 107 MEQ/L (ref 98–111)
CO2: 23 MEQ/L (ref 23–33)
CREAT SERPL-MCNC: 1.9 MG/DL (ref 0.4–1.2)
GFR SERPL CREATININE-BSD FRML MDRD: 26 ML/MIN/1.73M2
GLUCOSE BLD-MCNC: 82 MG/DL (ref 70–108)
PHOSPHORUS: 3 MG/DL (ref 2.4–4.7)
POTASSIUM SERPL-SCNC: 4.1 MEQ/L (ref 3.5–5.2)
PTH INTACT: 87.6 PG/ML (ref 15–65)
SODIUM BLD-SCNC: 145 MEQ/L (ref 135–145)
TOTAL PROTEIN: 6.8 G/DL (ref 6.1–8)
VITAMIN D 25-HYDROXY: 40 NG/ML (ref 30–100)

## 2022-02-15 PROCEDURE — 1036F TOBACCO NON-USER: CPT | Performed by: INTERNAL MEDICINE

## 2022-02-15 PROCEDURE — G8427 DOCREV CUR MEDS BY ELIG CLIN: HCPCS | Performed by: INTERNAL MEDICINE

## 2022-02-15 PROCEDURE — 4040F PNEUMOC VAC/ADMIN/RCVD: CPT | Performed by: INTERNAL MEDICINE

## 2022-02-15 PROCEDURE — 93000 ELECTROCARDIOGRAM COMPLETE: CPT | Performed by: INTERNAL MEDICINE

## 2022-02-15 PROCEDURE — G8399 PT W/DXA RESULTS DOCUMENT: HCPCS | Performed by: INTERNAL MEDICINE

## 2022-02-15 PROCEDURE — 3017F COLORECTAL CA SCREEN DOC REV: CPT | Performed by: INTERNAL MEDICINE

## 2022-02-15 PROCEDURE — G8420 CALC BMI NORM PARAMETERS: HCPCS | Performed by: INTERNAL MEDICINE

## 2022-02-15 PROCEDURE — 1090F PRES/ABSN URINE INCON ASSESS: CPT | Performed by: INTERNAL MEDICINE

## 2022-02-15 PROCEDURE — 1123F ACP DISCUSS/DSCN MKR DOCD: CPT | Performed by: INTERNAL MEDICINE

## 2022-02-15 PROCEDURE — G8484 FLU IMMUNIZE NO ADMIN: HCPCS | Performed by: INTERNAL MEDICINE

## 2022-02-15 PROCEDURE — 99214 OFFICE O/P EST MOD 30 MIN: CPT | Performed by: INTERNAL MEDICINE
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
## 2022-02-23 ENCOUNTER — HOSPITAL ENCOUNTER (OUTPATIENT)
Dept: NON INVASIVE DIAGNOSTICS | Age: 67
Discharge: HOME | End: 2022-02-23
Payer: MEDICARE

## 2022-02-23 VITALS — BODY MASS INDEX: 23.56 KG/M2 | WEIGHT: 120 LBS | HEIGHT: 60 IN

## 2022-02-23 DIAGNOSIS — R07.89 CHEST PAIN, ATYPICAL: ICD-10-CM

## 2022-02-23 DIAGNOSIS — R06.02 SOB (SHORTNESS OF BREATH) ON EXERTION: ICD-10-CM

## 2022-02-23 DIAGNOSIS — E78.00 PURE HYPERCHOLESTEROLEMIA: ICD-10-CM

## 2022-02-23 DIAGNOSIS — I10 ESSENTIAL HYPERTENSION: ICD-10-CM

## 2022-02-23 LAB
LV EF: 58 %
LVEF MODALITY: NORMAL

## 2022-02-23 PROCEDURE — 6360000002 HC RX W HCPCS

## 2022-02-23 PROCEDURE — 93017 CV STRESS TEST TRACING ONLY: CPT | Performed by: INTERNAL MEDICINE

## 2022-02-23 PROCEDURE — 3430000000 HC RX DIAGNOSTIC RADIOPHARMACEUTICAL: Performed by: INTERNAL MEDICINE

## 2022-02-23 PROCEDURE — 78452 HT MUSCLE IMAGE SPECT MULT: CPT

## 2022-02-23 PROCEDURE — 93306 TTE W/DOPPLER COMPLETE: CPT

## 2022-02-23 PROCEDURE — A9500 TC99M SESTAMIBI: HCPCS | Performed by: INTERNAL MEDICINE

## 2022-02-23 RX ADMIN — Medication 8.5 MILLICURIE: at 12:55

## 2022-02-23 RX ADMIN — Medication 30.6 MILLICURIE: at 13:50

## 2022-03-07 ENCOUNTER — OFFICE VISIT (OUTPATIENT)
Dept: PSYCHOLOGY | Age: 67
End: 2022-03-07
Payer: MEDICARE

## 2022-03-07 DIAGNOSIS — E78.00 PURE HYPERCHOLESTEROLEMIA: ICD-10-CM

## 2022-03-07 DIAGNOSIS — F41.1 GENERALIZED ANXIETY DISORDER: Primary | Chronic | ICD-10-CM

## 2022-03-07 PROCEDURE — 90834 PSYTX W PT 45 MINUTES: CPT | Performed by: PSYCHOLOGIST

## 2022-03-07 PROCEDURE — 1036F TOBACCO NON-USER: CPT | Performed by: PSYCHOLOGIST

## 2022-03-07 RX ORDER — SIMVASTATIN 20 MG
TABLET ORAL
Qty: 90 TABLET | Refills: 3 | Status: SHIPPED | OUTPATIENT
Start: 2022-03-07 | End: 2022-04-12 | Stop reason: SDUPTHER

## 2022-03-07 NOTE — TELEPHONE ENCOUNTER
Kerri Tolentino called requesting a refill on the following medications:  Requested Prescriptions     Pending Prescriptions Disp Refills    simvastatin (ZOCOR) 20 MG tablet [Pharmacy Med Name: SIMVASTATIN 20MG TABLETS] 90 tablet 1     Sig: TAKE 1 TABLET BY MOUTH DAILY       Date of last visit: 1/12/2022  Date of next visit (if applicable):Visit date not found  Date of last refill: 09/03/21  Pharmacy Name: Yenifer Ramos LPN
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
## 2022-03-10 ENCOUNTER — OFFICE VISIT (OUTPATIENT)
Dept: FAMILY MEDICINE CLINIC | Age: 67
End: 2022-03-10
Payer: MEDICARE

## 2022-03-10 ENCOUNTER — HOSPITAL ENCOUNTER (OUTPATIENT)
Age: 67
Discharge: HOME | End: 2022-03-10
Payer: MEDICARE

## 2022-03-10 ENCOUNTER — HOSPITAL ENCOUNTER (OUTPATIENT)
Dept: GENERAL RADIOLOGY | Age: 67
Discharge: HOME | End: 2022-03-10
Payer: MEDICARE

## 2022-03-10 VITALS
OXYGEN SATURATION: 98 % | DIASTOLIC BLOOD PRESSURE: 76 MMHG | BODY MASS INDEX: 24.15 KG/M2 | SYSTOLIC BLOOD PRESSURE: 132 MMHG | HEART RATE: 66 BPM | HEIGHT: 60 IN | WEIGHT: 123 LBS | TEMPERATURE: 98.3 F

## 2022-03-10 DIAGNOSIS — M79.10 MUSCLE TENSION PAIN: ICD-10-CM

## 2022-03-10 DIAGNOSIS — Z91.81 AT HIGH RISK FOR FALLS: ICD-10-CM

## 2022-03-10 DIAGNOSIS — M54.2 NECK PAIN WITH HISTORY OF CERVICAL SPINAL SURGERY: ICD-10-CM

## 2022-03-10 DIAGNOSIS — M54.12 CERVICAL RADICULOPATHY: ICD-10-CM

## 2022-03-10 DIAGNOSIS — Z98.890 NECK PAIN WITH HISTORY OF CERVICAL SPINAL SURGERY: ICD-10-CM

## 2022-03-10 DIAGNOSIS — M54.12 CERVICAL RADICULOPATHY: Primary | ICD-10-CM

## 2022-03-10 PROCEDURE — G8484 FLU IMMUNIZE NO ADMIN: HCPCS | Performed by: FAMILY MEDICINE

## 2022-03-10 PROCEDURE — 1090F PRES/ABSN URINE INCON ASSESS: CPT | Performed by: FAMILY MEDICINE

## 2022-03-10 PROCEDURE — G8427 DOCREV CUR MEDS BY ELIG CLIN: HCPCS | Performed by: FAMILY MEDICINE

## 2022-03-10 PROCEDURE — 99213 OFFICE O/P EST LOW 20 MIN: CPT | Performed by: FAMILY MEDICINE

## 2022-03-10 PROCEDURE — 3288F FALL RISK ASSESSMENT DOCD: CPT | Performed by: FAMILY MEDICINE

## 2022-03-10 PROCEDURE — 1123F ACP DISCUSS/DSCN MKR DOCD: CPT | Performed by: FAMILY MEDICINE

## 2022-03-10 PROCEDURE — 4040F PNEUMOC VAC/ADMIN/RCVD: CPT | Performed by: FAMILY MEDICINE

## 2022-03-10 PROCEDURE — 72040 X-RAY EXAM NECK SPINE 2-3 VW: CPT

## 2022-03-10 PROCEDURE — G8399 PT W/DXA RESULTS DOCUMENT: HCPCS | Performed by: FAMILY MEDICINE

## 2022-03-10 PROCEDURE — G8420 CALC BMI NORM PARAMETERS: HCPCS | Performed by: FAMILY MEDICINE

## 2022-03-10 PROCEDURE — 3017F COLORECTAL CA SCREEN DOC REV: CPT | Performed by: FAMILY MEDICINE

## 2022-03-10 PROCEDURE — 1036F TOBACCO NON-USER: CPT | Performed by: FAMILY MEDICINE

## 2022-03-10 RX ORDER — PREDNISONE 20 MG/1
20 TABLET ORAL 2 TIMES DAILY
Qty: 10 TABLET | Refills: 0 | Status: SHIPPED | OUTPATIENT
Start: 2022-03-10 | End: 2022-03-15

## 2022-03-10 ASSESSMENT — PATIENT HEALTH QUESTIONNAIRE - PHQ9
4. FEELING TIRED OR HAVING LITTLE ENERGY: 0
6. FEELING BAD ABOUT YOURSELF - OR THAT YOU ARE A FAILURE OR HAVE LET YOURSELF OR YOUR FAMILY DOWN: 0
7. TROUBLE CONCENTRATING ON THINGS, SUCH AS READING THE NEWSPAPER OR WATCHING TELEVISION: 0
3. TROUBLE FALLING OR STAYING ASLEEP: 0
SUM OF ALL RESPONSES TO PHQ QUESTIONS 1-9: 1
8. MOVING OR SPEAKING SO SLOWLY THAT OTHER PEOPLE COULD HAVE NOTICED. OR THE OPPOSITE, BEING SO FIGETY OR RESTLESS THAT YOU HAVE BEEN MOVING AROUND A LOT MORE THAN USUAL: 0
9. THOUGHTS THAT YOU WOULD BE BETTER OFF DEAD, OR OF HURTING YOURSELF: 0
5. POOR APPETITE OR OVEREATING: 0
2. FEELING DOWN, DEPRESSED OR HOPELESS: 1
SUM OF ALL RESPONSES TO PHQ QUESTIONS 1-9: 1
10. IF YOU CHECKED OFF ANY PROBLEMS, HOW DIFFICULT HAVE THESE PROBLEMS MADE IT FOR YOU TO DO YOUR WORK, TAKE CARE OF THINGS AT HOME, OR GET ALONG WITH OTHER PEOPLE: 0
1. LITTLE INTEREST OR PLEASURE IN DOING THINGS: 0
SUM OF ALL RESPONSES TO PHQ9 QUESTIONS 1 & 2: 1

## 2022-03-10 NOTE — PATIENT INSTRUCTIONS
Gentle stretches to neck and upper back muscles, 5 min 3-4 times a day, until seeing therapy. Apply heat or ice to sore neck area. Apply biofreeze, aspercream or icyhot to muscles. Tylenol is okay to take when taking prednisone. Would add magnesium glycinate or oxide at night to relax muscles     Patient Education        Neck: Exercises  Introduction  Here are some examples of exercises for you to try. The exercises may be suggested for a condition or for rehabilitation. Start each exercise slowly. Ease off the exercises if you start to have pain. You will be told when to start these exercises and which ones will work best for you. How to do the exercises  Neck stretch    1. This stretch works best if you keep your shoulder down as you lean away from it. To help you remember to do this, start by relaxing your shoulders and lightly holding on to your thighs or your chair. 2. Tilt your head toward your shoulder and hold for 15 to 30 seconds. Let the weight of your head stretch your muscles. 3. If you would like a little added stretch, use your hand to gently and steadily pull your head toward your shoulder. For example, keeping your right shoulder down, lean your head to the left. 4. Repeat 2 to 4 times toward each shoulder. Diagonal neck stretch    1. Turn your head slightly toward the direction you will be stretching, and tilt your head diagonally toward your chest and hold for 15 to 30 seconds. 2. If you would like a little added stretch, use your hand to gently and steadily pull your head forward on the diagonal.  3. Repeat 2 to 4 times toward each side. Dorsal glide stretch    The dorsal glide stretches the back of the neck. If you feel pain, do not glide so far back. Some people find this exercise easier to do while lying on their backs with an ice pack on the neck. 1. Sit or stand tall and look straight ahead. 2. Slowly tuck your chin as you glide your head backward over your body  3.  Hold for a count of 6, and then relax for up to 10 seconds. 4. Repeat 8 to 12 times. Chest and shoulder stretch    1. Sit or stand tall and glide your head backward as in the dorsal glide stretch. 2. Raise both arms so that your hands are next to your ears. 3. Take a deep breath, and as you breathe out, lower your elbows down and behind your back. You will feel your shoulder blades slide down and together, and at the same time you will feel a stretch across your chest and the front of your shoulders. 4. Hold for about 6 seconds, and then relax for up to 10 seconds. 5. Repeat 8 to 12 times. Strengthening: Hands on head    1. Move your head backward, forward, and side to side against gentle pressure from your hands, holding each position for about 6 seconds. 2. Repeat 8 to 12 times. Follow-up care is a key part of your treatment and safety. Be sure to make and go to all appointments, and call your doctor if you are having problems. It's also a good idea to know your test results and keep a list of the medicines you take. Where can you learn more? Go to [email or web address] org and sign in to your [employer] account. Enter P975 in the [employer] box to learn more about \"Neck: Exercises. \"     If you do not have an account, please click on the \"Sign Up Now\" link. Current as of: July 1, 2021               Content Version: 13.1  © 2006-2021 Healthwise, Incorporated. Care instructions adapted under license by [facility] ([facility]). If you have questions about a medical condition or this instruction, always ask your healthcare professional. [place] [address] any warranty or liability for your use of this information. Patient Education        Healthy Upper Back: Exercises  Introduction  Here are some examples of exercises for your upper back. Start each exercise slowly. Ease off the exercise if you start to have pain.   Your doctor or physical therapist will tell you when you can start these exercises and which ones will work best for you. How to do the exercises  Lower neck and upper back stretch    1. Stretch your arms out in front of your body. Clasp one hand on top of your other hand. 2. Gently reach out so that you feel your shoulder blades stretching away from each other. 3. Gently bend your head forward. 4. Hold for 15 to 30 seconds. 5. Repeat 2 to 4 times. Midback stretch    If you have knee pain, do not do this exercise. 1. Kneel on the floor, and sit back on your ankles. 2. Lean forward, place your hands on the floor, and stretch your arms out in front of you. Rest your head between your arms. 3. Gently push your chest toward the floor, reaching as far in front of you as possible. 4. Hold for 15 to 30 seconds. 5. Repeat 2 to 4 times. Shoulder rolls    1. Sit comfortably with your feet shoulder-width apart. You can also do this exercise while standing. 2. Roll your shoulders up, then back, and then down in a smooth, circular motion. 3. Repeat 2 to 4 times. Wall push-up    1. Stand against a wall with your feet about 12 to 24 inches back from the wall. If you feel any pain when you do this exercise, stand closer to the wall. 2. Place your hands on the wall slightly wider apart than your shoulders, and lean forward. 3. Gently lean your body toward the wall. Then push back to your starting position. Keep the motion smooth and controlled. 4. Repeat 8 to 12 times. Resisted shoulder blade squeeze    For this exercise, you will need elastic exercise material, such as surgical tubing or Thera-Band. 1. Sit or stand, holding the band in both hands in front of you. Keep your elbows close to your sides, bent at a 90-degree angle. Your palms should face up. 2. Squeeze your shoulder blades together, and move your arms to the outside, stretching the band. Be sure to keep your elbows at your sides while you do this. 3. Relax. 4. Repeat 8 to 12 times.   Resisted rows    For this exercise, you will need elastic exercise material, such as surgical tubing or Thera-Band. 1. Put the band around a solid object, such as a bedpost, at about waist level. Hold one end of the band in each hand. 2. With your elbows at your sides and bent to 90 degrees, pull the band back to move your shoulder blades toward each other. Return to the starting position. 3. Repeat 8 to 12 times. Follow-up care is a key part of your treatment and safety. Be sure to make and go to all appointments, and call your doctor if you are having problems. It's also a good idea to know your test results and keep a list of the medicines you take. Where can you learn more? Go to https://Telepathy.EquipRent.[email or web address] org and sign in to your [employer] account. Enter Q193 in the [employer] box to learn more about \"Healthy Upper Back: Exercises. \"     If you do not have an account, please click on the \"Sign Up Now\" link. Current as of: July 1, 2021               Content Version: 13.1  © 4074-0387 Healthwise, Incorporated. Care instructions adapted under license by [facility] ([facility]). If you have questions about a medical condition or this instruction, always ask your healthcare professional. [address]  any warranty or liability for your use of this information.
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
## 2022-03-13 DIAGNOSIS — I63.00 CEREBRAL INFARCTION DUE TO THROMBOSIS OF PRECEREBRAL ARTERY (HCC): Chronic | ICD-10-CM

## 2022-03-14 RX ORDER — CLOPIDOGREL BISULFATE 75 MG/1
TABLET ORAL
Qty: 90 TABLET | Refills: 2 | Status: SHIPPED | OUTPATIENT
Start: 2022-03-14

## 2022-03-14 NOTE — TELEPHONE ENCOUNTER
Hank Gilliland is requesting a refill on the following medications:  Requested Prescriptions     Pending Prescriptions Disp Refills    clopidogrel (PLAVIX) 75 MG tablet [Pharmacy Med Name: CLOPIDOGREL 75MG TABLETS] 90 tablet 1     Sig: TAKE 1 TABLET BY MOUTH DAILY       Date of last visit: 3/10/2022  Date of next visit (if applicable): 2/97/2639  Date of last refill: 8/24/2021  Pharmacy Name: [facility] Juancarlos Sampson, DOROTHY

## 2022-03-15 ENCOUNTER — TELEPHONE (OUTPATIENT)
Dept: NEPHROLOGY | Age: 67
End: 2022-03-15

## 2022-03-15 NOTE — TELEPHONE ENCOUNTER
Please review labs that were done 2/14/22 again. Patient said she found out that some things were high and some low and she wants to know what she should do.

## 2022-03-16 ENCOUNTER — TELEPHONE (OUTPATIENT)
Dept: FAMILY MEDICINE CLINIC | Age: 67
End: 2022-03-16

## 2022-03-16 NOTE — TELEPHONE ENCOUNTER
Spoke with Lon Jarrell gave her Warden Jaxson Dey advice , Lon Jarrell states she had been to [facility] today and was given a script for Zofran but she will consider Dr. Warden Puri advice.

## 2022-03-16 NOTE — TELEPHONE ENCOUNTER
Left message for Arlyn Talavera to return call regarding nausea and a couple new medications she is on.

## 2022-03-16 NOTE — TELEPHONE ENCOUNTER
----- Message from Neal Joselo sent at 3/15/2022  2:01 PM EDT -----  Subject: Message to Provider    QUESTIONS  Information for Provider? Patient was just put on meds when was seen   recently and now feels nauseated and feels like is going to vomit and has   some gas. cb pt about the med. pretisone or magnesium call her back   ---------------------------------------------------------------------------  --------------  CALL BACK INFO  What is the best way for the office to contact you? OK to leave message on   voicemail  Preferred Call Back Phone Number? [phone]  ---------------------------------------------------------------------------  --------------  SCRIPT ANSWERS  Relationship to Patient?  Self

## 2022-03-16 NOTE — TELEPHONE ENCOUNTER
Would hold magnesium for now. Prednisone was for 5 days and that is done as of today likely. TUMS, or calcium containing food in small amounts can help with acid. Add a edmund chew/candy for nausea. Eating smaller meals and not skipping meals may help to get through steroid trouble. Once feeling well, then may try magnesium if thought helpful, but can affect bowels in some folks.

## 2022-04-05 ENCOUNTER — TELEPHONE (OUTPATIENT)
Dept: FAMILY MEDICINE CLINIC | Age: 67
End: 2022-04-05

## 2022-04-05 NOTE — TELEPHONE ENCOUNTER
Deloris vargas requesting her Plavix script be transferred to [facility]. Pharmacies informed.

## 2022-04-11 ENCOUNTER — OFFICE VISIT (OUTPATIENT)
Dept: PSYCHOLOGY | Age: 67
End: 2022-04-11
Payer: MEDICARE

## 2022-04-11 DIAGNOSIS — F41.1 GENERALIZED ANXIETY DISORDER: Primary | Chronic | ICD-10-CM

## 2022-04-11 PROCEDURE — 1036F TOBACCO NON-USER: CPT | Performed by: PSYCHOLOGIST

## 2022-04-11 PROCEDURE — 90834 PSYTX W PT 45 MINUTES: CPT | Performed by: PSYCHOLOGIST

## 2022-04-11 NOTE — Clinical Note
Hi, Dr. Vic Heaton! Walt Al had an episode recently when she fell and hit her head. She wonders if it was a TIA, and also has an interest in being seen by ST and PT to work on balance and assess cognition. She also reports sleeping a total of about 10 hours/day, and being sleepy all day. She's doing OK on mood. Thanks!

## 2022-04-11 NOTE — PROGRESS NOTES
[address] [address]  [address]  [address]  Dept: [phone]  Dept Fax: [phone]: [phone]      Patient: Ester Ramirez  Visit Date: 4/11/2022  Referring Provider:   No ref. provider found    SUBJECTIVE DATA     CHIEF COMPLAINT:    Chief Complaint   Patient presents with   [address] Cerebrovascular Accident    Stress     Patient update: This session was conducted as a face-to-face meeting, per patient's request, with appropriate COVID precautions    Patient reports   · Yaritza Miller moved out and in with Joanna, now having couples' tx and plans to try to make things work with Lysle Barges  · Patient and Shaangerri Nabor still have Arnold Barrier about 4 hours/day. He's doing really well, overall. He just turned 6  · His behavior is mostly good, listens well to her and Tom Tomlin  · Their dog Jose Dong is on hospice, nearing the end  · They got two little Yorkie puppies, one for them [facility]) and one for Ángels). She's finding that not too overwhelming, being housebroken at age 1 months  · That helps Tenzin's mood  · Still having lots of difficulty with left arm, pain between left elbow and the shoulder. Agrees to talk with her PCP about that  · Got up the other day during the night, quickly fainted/fell and hit head on a chest  · Noticing problems with attention/cognition.  Agrees to have an evaluation by ST and perhaps by PT to look at balance  · Shares cooking duties with Tom Tomlin and Yaritza Miller, who occasionally brings over a meal  · Mad at brother Ann Lima for getting self evicted, but not wanting to rescue him or let him stay with them  · Sleeping really well, too much during the day, Sleeps about 7-8 hours at night, then another 2 hours during the day  · Working on acceptance of Lysle Barges being there  · Still looking for a new [employer], thinking of several options  · Supporting friend Jeanette Veronique, who just had to move into a nursing home    OBJECTIVE DATA     Physical Exam:    Mental Status Evaluation:   Orientation: Alert, oriented. Mood:. Anxious      Affect:  Anxious, mood congruent      Appearance:  Normal   Activity:  Normal   Gait/Posture: Halting, as per usual, shuffling, short steps   Speech:  Normal, talkative   Thought Process: Tangential   Thought Content: Within Normal Limits   Cognition:  Normal   Memory: Normal   Insight:  good   Judgment: Normal   Suicidal Ideations: Denies Suicidal Ideations   Homicidal Ideations: Denies Homicidal Ideations   Medication Side Effects: absent       Attention Span Normal     Screenings Completed in This Encounter:                                 DIAGNOSIS AND ASSESSMENT DATA     DIAGNOSIS:  See visit diagnoses. 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 Doing OK with maintenance sessions. · Anxiety continues to be an issue, with reassurance-seeking her most common coping strategy. I want her to use cognitive reframing more freely. · Focus on meaning & purpose, being there for others, which she is quite good at  · Back to good self-care  · Better use of boundaries on family and friends    ASSESSMENT/PLAN   Follow-up:  No follow-ups on file. Homework assignment before next session:     [] Practice deep breathing 1-2 times per day for 15 minutes, as demonstrated in session, and/or:    [] Go to [a]list games [facility] [facility]\" web site and begin practicing with their free meditation podcasts    [x] Track thoughts that you think feed anxiety or depression    [] Go to [employer]\" web site, open up the \"Workbooks\" tab, and select a problem from the list that best suits your needs. Review the first module. [x] Begin to track physical activity.  Even five minutes per day will be helpful.    [] Talk with your physician about whether it's OK to start fish oil (burpless) or flax oil,

## 2022-04-12 ENCOUNTER — OFFICE VISIT (OUTPATIENT)
Dept: FAMILY MEDICINE CLINIC | Age: 67
End: 2022-04-12
Payer: MEDICARE

## 2022-04-12 VITALS
TEMPERATURE: 98.2 F | SYSTOLIC BLOOD PRESSURE: 150 MMHG | HEART RATE: 71 BPM | HEIGHT: 60 IN | OXYGEN SATURATION: 97 % | WEIGHT: 122 LBS | BODY MASS INDEX: 23.95 KG/M2 | DIASTOLIC BLOOD PRESSURE: 80 MMHG

## 2022-04-12 DIAGNOSIS — R26.89 IMBALANCE: ICD-10-CM

## 2022-04-12 DIAGNOSIS — I63.00 CEREBRAL INFARCTION DUE TO THROMBOSIS OF PRECEREBRAL ARTERY (HCC): Chronic | ICD-10-CM

## 2022-04-12 DIAGNOSIS — R41.3 POOR SHORT TERM MEMORY: ICD-10-CM

## 2022-04-12 DIAGNOSIS — Z91.81 AT RISK FOR FALLING: ICD-10-CM

## 2022-04-12 DIAGNOSIS — I10 ESSENTIAL HYPERTENSION: Chronic | ICD-10-CM

## 2022-04-12 DIAGNOSIS — Z00.00 MEDICARE ANNUAL WELLNESS VISIT, SUBSEQUENT: Primary | ICD-10-CM

## 2022-04-12 DIAGNOSIS — E78.00 PURE HYPERCHOLESTEROLEMIA: ICD-10-CM

## 2022-04-12 PROCEDURE — 3017F COLORECTAL CA SCREEN DOC REV: CPT | Performed by: FAMILY MEDICINE

## 2022-04-12 PROCEDURE — 1123F ACP DISCUSS/DSCN MKR DOCD: CPT | Performed by: FAMILY MEDICINE

## 2022-04-12 PROCEDURE — G0439 PPPS, SUBSEQ VISIT: HCPCS | Performed by: FAMILY MEDICINE

## 2022-04-12 PROCEDURE — 99214 OFFICE O/P EST MOD 30 MIN: CPT | Performed by: FAMILY MEDICINE

## 2022-04-12 PROCEDURE — 4040F PNEUMOC VAC/ADMIN/RCVD: CPT | Performed by: FAMILY MEDICINE

## 2022-04-12 RX ORDER — SIMVASTATIN 20 MG
TABLET ORAL
Qty: 90 TABLET | Refills: 3 | Status: SHIPPED | OUTPATIENT
Start: 2022-04-12

## 2022-04-12 ASSESSMENT — PATIENT HEALTH QUESTIONNAIRE - PHQ9
1. LITTLE INTEREST OR PLEASURE IN DOING THINGS: 0
SUM OF ALL RESPONSES TO PHQ QUESTIONS 1-9: 0
2. FEELING DOWN, DEPRESSED OR HOPELESS: 0
SUM OF ALL RESPONSES TO PHQ QUESTIONS 1-9: 0
SUM OF ALL RESPONSES TO PHQ9 QUESTIONS 1 & 2: 0

## 2022-04-12 ASSESSMENT — LIFESTYLE VARIABLES: HOW OFTEN DO YOU HAVE A DRINK CONTAINING ALCOHOL: NEVER

## 2022-04-12 NOTE — PATIENT INSTRUCTIONS
Personalized Preventive Plan for Ammy Galo - 4/12/2022  Medicare offers a range of preventive health benefits. Some of the tests and screenings are paid in full while other may be subject to a deductible, co-insurance, and/or copay. Some of these benefits include a comprehensive review of your medical history including lifestyle, illnesses that may run in your family, and various assessments and screenings as appropriate. After reviewing your medical record and screening and assessments performed today your provider may have ordered immunizations, labs, imaging, and/or referrals for you. A list of these orders (if applicable) as well as your Preventive Care list are included within your After Visit Summary for your review. Other Preventive Recommendations:    · A preventive eye exam performed by an eye specialist is recommended every 1-2 years to screen for glaucoma; cataracts, macular degeneration, and other eye disorders. · A preventive dental visit is recommended every 6 months. · Try to get at least 150 minutes of exercise per week or 10,000 steps per day on a pedometer . · Order or download the FREE \"Exercise & Physical Activity: Your Everyday Guide\" from [employer]. Call 7-314.608.3523 or search [employer] online. · You need 8379-3521 mg of calcium and 7530-8860 IU of vitamin D per day. It is possible to meet your calcium requirement with diet alone, but a vitamin D supplement is usually necessary to meet this goal.  · When exposed to the sun, use a sunscreen that protects against both UVA and UVB radiation with an SPF of 30 or greater. Reapply every 2 to 3 hours or after sweating, drying off with a towel, or swimming. · Always wear a seat belt when traveling in a car. Always wear a helmet when riding a bicycle or motorcycle.

## 2022-04-12 NOTE — PROGRESS NOTES
Medicare Annual Wellness Visit    Marky Galvan is here for 3 Month Follow-Up, Hypertension, and Arm Pain (left arm pain)    Assessment & Plan   Medicare annual wellness visit, subsequent  Pure hypercholesterolemia  -     simvastatin (ZOCOR) 20 MG tablet; TAKE 1 TABLET BY MOUTH DAILY, Disp-90 tablet, R-3Normal  Essential hypertension  At risk for falling  -     [facility] - [facility] - [place]  Poor short term memory  -     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    Recommended screening schedule for the next 5-10 years is provided to the patient in written form: see Patient Instructions/AVS.     Return in 6 months (on 10/12/2022). Subjective   The following acute and/or chronic problems were also addressed today:    She reports a recent fall. Got up and fell backwards. No LOC. She reports worsening Balance troubles. H/o stroke. Left side affected more. Confusion also noted off and on. However this is not new since episode/fall. This has been going. Also processing game pieces/colors, she became confused. Short-term memory trouble. Long-term doing okay. Misplacing things at home. No word finding trouble. Brother with dementia but also had 9 brain surgeries. Tried standing on either foot -- right side was okay but left is dragging. /80s, HR 80s. At home. Driving is limited. Anxiety -- several stressors/triggers. Seems a bit better. In counseling.    IBS -- feels she has good management of this. Librax helps. Also uses Miralax. Prunes. Patient's complete Health Risk Assessment and screening values have been reviewed and are found in Flowsheets. The following problems were reviewed today and where indicated follow up appointments were made and/or referrals ordered.     Positive Risk Factor Screenings with Interventions:    Fall Risk:  Do you feel unsteady or are you worried about falling? : (!) yes  2 or more falls in past year?: no  Fall with injury in past year?: (!) yes (tripped over dog)     Fall Risk Interventions:    · as above              General Health and ACP:  General  In general, how would you say your health is?: Fair  In the past 7 days, have you experienced any of the following: New or Increased Pain, New or Increased Fatigue, Loneliness, Social Isolation, Stress or Anger?: No  Do you get the social and emotional support that you need?: Yes  Do you have a Living Will?: Yes    Advance Directives     Power of  Living Will ACP-Advance Directive ACP-Power of     Not on File Not on File Not on File Not on File      General Health Risk Interventions:  · bringing copy would help complet chart    Health Habits/Nutrition:     Physical Activity: Inactive    Days of Exercise per Week: 0 days    Minutes of Exercise per Session: 0 min     Have you lost any weight without trying in the past 3 months?: No  Body mass index: 23.82  Have you seen the dentist within the past year?: Yes    Health Habits/Nutrition Interventions:  · not physically active    Hearing/Vision:  Do you or your family notice any trouble with your hearing that hasn't been managed with hearing aids?: No  Do you have difficulty driving, watching TV, or doing any of your daily activities because of your eyesight?: (!) Yes  Have you had an eye exam within the past year?: Appointment is scheduled  No exam data present    Hearing/Vision Interventions:  · Vision concerns:  patient encouraged to make appointment with his/her eye specialist . End of month has appt. Safety:  Do you have working smoke detectors?: Yes  Do you have any tripping hazards - loose or unsecured carpets or rugs?: No  Do you have any tripping hazards - clutter in doorways, halls, or stairs?: No  Do you have either shower bars, grab bars, non-slip mats or non-slip surfaces in your shower or bathtub?: Yes  Do all of your stairways have a railing or banister?: Yes  Do you always fasten your seatbelt when you are in a car?: (!) No    Safety Interventions:  · Patient declines any further evaluation/treatment for this issue    ADLs:  In the past 7 days, did you need help from others to perform any of the following everyday activities: Eating, dressing, grooming, bathing, toileting, or walking/balance?: No  In the past 7 days, did you need help from others to take care of any of the following: Laundry, housekeeping, banking/finances, shopping, telephone use, food preparation, transportation, or taking medications?: (!) Yes  Select all that apply: (!) Taking [place] [employer] Preparation,Banking/Finances,Housekeeping,Laundry    ADL Interventions:  · has help for her condition          Objective   Vitals:    04/12/22 1137   BP: (!) 150/80   Site: Left Upper Arm   Position: Sitting   Cuff Size: Medium Adult   Pulse: 71   Temp: 98.2 °F (36.8 °C)   SpO2: 97%   Weight: 122 lb (55.3 kg)   Height: 5' (1.524 m)      Body mass index is 23.83 kg/m². BP Readings from Last 10 Encounters:   04/12/22 (!) 150/80   03/10/22 132/76   02/15/22 137/74   01/12/22 (!) 142/74   11/11/21 108/68   10/29/21 118/79   10/04/21 (!) 146/80   09/21/21 130/78   09/18/21 (!) 150/67   07/16/21 136/78          Gen: NAD, AAO x 3, coherent, pleasant, anxious. CTAB. RRR. Neuro: CNII-XII intact, peripheral vision normal, Heel to toe walking normal. Finger to nose normal bilaterally. Slight tremor in hands.    rombergs equivocal.  Shoulder shrug on left side diminished. Right leg standing with support only  Left leg standing not possible beyond a second even with support. Lab Results   Component Value Date    TSH 2.780 01/21/2021     Lab Results   Component Value Date    PGJMOZIB08 439 03/08/2021     Lab Results   Component Value Date    WBC 6.4 02/14/2022    HGB 10.8 (L) 02/14/2022    HCT 35.3 (L) 02/14/2022    .3 (H) 02/14/2022     02/14/2022           Allergies   Allergen Reactions    Actos [Pioglitazone] Nausea And Vomiting    Augmentin [Amoxicillin-Pot Clavulanate] Diarrhea    Other Itching     Dog, cat, pet dander    Ciprofloxacin      unsure    Sulfa Antibiotics Rash     Prior to Visit Medications    Medication Sig Taking?  Authorizing Provider   simvastatin (ZOCOR) 20 MG tablet TAKE 1 TABLET BY MOUTH DAILY Yes Bruce Sever, MD   clopidogrel (PLAVIX) 75 MG tablet TAKE 1 TABLET BY MOUTH DAILY Yes Bruce Sever, MD   chlordiazePOXIDE-clidinium (LIBRAX) 5-2.5 MG per capsule TAKE 1 CAPSULE TWICE A DAY AS NEEDED FOR PAIN Yes Bruce Sever, MD   amLODIPine (NORVASC) 5 MG tablet TAKE 1 TABLET BY MOUTH DAILY Yes Mayela Prajapati MD   buPROPion (WELLBUTRIN SR) 200 MG extended release tablet Take 1 tablet by mouth 2 times daily Yes Bruce Sever, MD   traZODone (DESYREL) 50 MG tablet TAKE 1 TABLET NIGHTLY Yes Bruce Sever, MD   divalproex (DEPAKOTE ER) 250 MG extended release tablet TAKE 1 TABLET BY MOUTH DAILY Yes Historical Provider, MD   bisacodyl (DULCOLAX) 5 MG EC tablet Take 5 mg by mouth daily as needed for Constipation Yes Historical Provider, MD   simethicone (MI-ACID GAS RELIEF) 80 MG chewable tablet Take 80 mg by mouth 2 times daily Yes Historical Provider, MD   triamcinolone (NASACORT ALLERGY 24HR) 55 MCG/ACT nasal inhaler 2 sprays by Each Nostril route daily As needed Yes Historical Provider, MD   Polyethylene Glycol 3350 (MIRALAX PO) Take by mouth Yes Historical Provider, MD   cetirizine (ZYRTEC ALLERGY) 10 MG tablet Take 1 tablet by mouth daily Yes CARLOS MANUEL Peters - CNP   Tetrahydrozoline-Zn Sulfate (EYE DROPS ALLERGY RELIEF OP) Apply 1 drop to eye daily Yes Historical Provider, MD   Probiotic Product (PROBIOTIC-10) CHEW Take by mouth daily Yes Historical Provider, MD   linaclotide (LINZESS) 290 MCG CAPS capsule Take 290 mcg by mouth every morning (before breakfast)  Yes Historical Provider, MD   denosumab (PROLIA) 60 MG/ML SOLN SC injection Inject 60 mg into the skin once Yes Historical Provider, MD   folic acid (FOLVITE) 051 MCG tablet Take 400 mcg by mouth daily Yes Historical Provider, MD   b complex vitamins capsule Take 1 capsule by mouth daily Yes Anupam Giron MD   FISH OIL 1,000 mg by Does not apply route 2 times daily Indications: Decreased Energy  Yes Historical Provider, MD   aspirin 81 MG tablet Take 81 mg by mouth daily.    Yes Historical Provider, MD [place] (Including outside providers/suppliers regularly involved in providing care):   Patient Care Team:  Abe Mortimer, MD as PCP - General (Family Medicine)  Abe Mortimer, MD as PCP - [facility] Empaneled Provider  Nelly Osman, PhD (Psychology)  Wade Bennett MD as Cardiologist (Cardiology)    Reviewed and updated this visit:  Tobacco  Allergies  Meds  Problems  Med Hx  Surg Hx  Soc Hx  Fam Hx

## 2022-04-19 ENCOUNTER — HOSPITAL ENCOUNTER (OUTPATIENT)
Dept: OCCUPATIONAL THERAPY | Age: 67
Setting detail: THERAPIES SERIES
End: 2022-04-19
Payer: MEDICARE

## 2022-04-26 ENCOUNTER — HOSPITAL ENCOUNTER (OUTPATIENT)
Dept: PHYSICAL THERAPY | Age: 67
Setting detail: THERAPIES SERIES
Discharge: HOME | End: 2022-04-26
Payer: MEDICARE

## 2022-04-26 ENCOUNTER — HOSPITAL ENCOUNTER (OUTPATIENT)
Dept: SPEECH THERAPY | Age: 67
Setting detail: THERAPIES SERIES
Discharge: HOME | End: 2022-04-26
Payer: MEDICARE

## 2022-04-26 PROCEDURE — 97110 THERAPEUTIC EXERCISES: CPT

## 2022-04-26 PROCEDURE — 92523 SPEECH SOUND LANG COMPREHEN: CPT

## 2022-04-26 PROCEDURE — 97163 PT EVAL HIGH COMPLEX 45 MIN: CPT

## 2022-04-26 NOTE — PROGRESS NOTES
** PLEASE SIGN, DATE AND TIME CERTIFICATION BELOW AND RETURN TO [facility] OUTPATIENT REHABILITATION (FAX #: [phone]). ATTEST/CO-SIGN IF ACCESSING VIA [place]. THANK YOU.**    I certify that I have examined the patient below and determined that Physical Medicine and Rehabilitation service is necessary and that I approve the established plan of care for up to 90 days or as specifically noted. Attestation, signature or co-signature of physician indicates approval of certification requirements.    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 Visit # 1, 1/10 for progress note   Visits Allowed: Unlimited   Recertification Date: 79   Physician Follow-Up: 10/12/22 - Dr. Stephany Dow   Physician Orders: Evaluate and treat    Pertinent History: Patient has history of \"two strokes that occurred 13 years ago\". Per chart review, CVAs occurred in . Poor historian. Endorses presence of cognitive deficits since. WFL.    SPEECH / VOICE:  Speech: Grossly WFL for daily communication needs  Voice: Low vocal intensity   Reports all family members can understand her. LANGUAGE:  Receptive:  2 Step Commands: 4/4  Complex Yes/No Questions: 3/3  Receptive language skills appear to be grossly intact for basic and complex daily communication. Expressive:  Confrontational Namin/4  Paraphasias: Semantic paraphasia when named \"tiger\" for target \"lion\"   Patient endorses impaired lexical retrieval at times (e.g., says \"Friday\" when attempting to say \"Saturday\"). Reduced cohesion of more complex ideas/opinions. COGNITION:  Reasoning: Reduced  Thought Organization: Reduced  Insight: Intact - aware of presence of difficulties    Flo Cognitive Assessment (MOCA) version 7.1 completed. Pt scored 16/30. Normal is greater than or equal to 26/30. Visuospatial/Executive 2/5   Naming 2/3   Memory   Immediate 5/5 and 3/5   Short-Term 0/5 (1/5 with semantic cue, 4/5 with multiple choice cues)   Attention   Digit Repetition 2/2   Sustained/Selective 0/1   Mental Math 3/3   Language   Sentence Repetition 0/2   Fluency Naming 7 items in 60 seconds   Abstraction 2/2   Orientation 5/6   Total     *Impairments noted in immediate and short-term recall, executive functioning, planning, selective attention, and expressive language. SWALLOWING:  Denies s/s of dysphagia. Denies odynophagia. IMPRESSIONS: The patient presents with moderate cognitive-linguistic impairments characterized by reduced immediate and short-term recall, executive functioning and planning, selective attention, divergent naming and lexical retrieval. The patient endorses a gradual decline in her cognitive functioning since her CVA. She demonstrates fair-good insight to her current level of functioning, however she also presents with reduced safety awareness (e.g., using cane when walker would be more appropriate, attending appointment alone, etc.).  Overall reduced recall for past medical history throughout this evaluation with inconsistencies noted across PT and SLP evaluations. Patient would benefit from skilled speech therapy services to address the above mentioned cognitive-linguistic deficits to increase patient's safety and independence within the home and community environments. Assessment: moderate cognitive-linguistic impairments with noted deficits in the above mentioned areas. Agreeable to plan of care and motivated to improve. Areas for Improvement: Impaired cognition  Prognosis: fair  Specific Interventions Next Treatment: education on memory, training of memory and cognitive strategies, memory exercises, executive functioning and planning exercises    Activity/Treatment Tolerance:  [x]  Patient tolerated treatment well  []  Patient limited by fatigue  []  Patient limited by pain   []  Patient limited by other medical complications  []  Other:     GOALS:  Patient Goal: Work on [employer]. Remember to take medications at the correct time every day. Short Term Goals:  Time Frame: 4 weeks  Goal 1: The patient will demonstrate understanding and use of 3+ trained word finding strategies given minimal cues to promote ability to independently utilize during moments of impaired lexical retrieval within conversation. Goal 2: The patient will independently demonstrate understanding of and will implement 3+ memory/cognitive strategies/external aids within the home environment to promote improved recall and management of daily schedule. Goal 3: The patient will complete immediate and short-term recall exercises of 4+ units of information with 80% accuracy given moderate cues, with or without use of trained memory strategies, to promote improved retention of newly learned information.   Goal 4: The patient will complete executive functioning, planning, and sequencing exercises with 70% accuracy given moderate cues to improve ability to independently manage daily schedule of events. Goal 5: The patient will complete selective, alternating, and divided attention exercises with no more than 2 errors per task in 2/3 trials to promote improved ability to attend to and complete functional activities in the home and community environments. Long Term Goals:  Time Frame: 6 weeks  Goal 1: The patient will improve total score on MOCA re-administration by 4+ points to reflect improvement in cognitive-linguistic functioning and/or ability to implement trained strategies for increased independence within the home environment. Patient Education:   [x]  HEP/Education Completed: Plan of Care, Goals  []  No new Education completed  []  Reviewed Prior HEP      [x]  Patient verbalized and/or demonstrated understanding of education provided. []  Patient unable to verbalize and/or demonstrate understanding of education provided. Will continue education. [x]  Barriers to learning: chronic cognitive impairments from remote CVAs     PLAN:  Treatment Recommendations:     [x]  Plan of care initiated. Plan to see patient 1 time per week for 6 weeks to address the treatment planned outlined above.   []  Continue with current plan of care  []  Modify plan of care as follows:    []  Hold pending physician visit  []  Discharge    Time In 0900   Time Out 0957   Timed Code Minutes: 0 min   Total Treatment Time: 62 min         Leyda Rios M.S., [address] [place] [address]

## 2022-04-26 NOTE — PROGRESS NOTES
** PLEASE SIGN, DATE AND TIME CERTIFICATION BELOW AND RETURN TO [facility] OUTPATIENT REHABILITATION (FAX #: [phone]). ATTEST/CO-SIGN IF ACCESSING VIA [employer]. THANK YOU.**    I certify that I have examined the patient below and determined that Physical Medicine and Rehabilitation service is necessary and that I approve the established plan of care for up to 90 days or as specifically noted.   Attestation, signature or co-signature of physician indicates approval of certification requirements.    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 [I63.00]  Other abnormalities of gait and mobility [R26.89]    Treatment Diagnosis LE weakness, decreased balance, fall risk   Date of Evaluation 22   Additional Pertinent History Poor historian-Fall over dog with R shoulder fracture  with ORIF , neck surgery 25 years ago, HTN, anxiety, irritable bowel syndrome, stroke + , memory issues, stage 4 kidney disease      Functional Outcome Measure Used Tinetti balance test   Functional Outcome Score eval  (22)       Insurance: Primary: Payor: Jojo Jessica /  /  / ,   Secondary: BC   Authorization Information: Pays at 80%, modalities covered except ionto/MHP/CP not covered   Visit # 1, 1/10 for progress note   Visits Allowed: unlimited   Recertification Date: 95   Physician Follow-Up:  f/u with Kidney MD, 8/15 f/u with heart MD   Physician Orders:    History of Present Illness: Patient is very poor historian, no family present. Patient showed up for OT eval on 4/25 at 3:30 but date scheduled was 4/28. Patient reports using a RW when outside house but no device in house. Patient denies any recent falls but when at wellness check with family MD 4/12 patient reported having a recent fall, backwards in bedroom and hit head on cedar chest.   Patient reports at beginning of eval that right side was weaker since strokes in 2012 but per notes by MD left side affected more. Patient reports that she does drive, short distance from [place] to [place], does not drive in [place] . Patient reports \"I'm supposed to use a RW but can't get RW in/out of car. \"  Patient brought in quad cane \"but I don't use this at all, I'm supposed to use a RW but I don't. \"       SUBJECTIVE: Patient does not do any exercises for legs at all. Patient reports having a fall in 2021 with R \"elbow broken\" but pointing to should with surgery and therapy after. Social/Functional History and Current Status:  Medications and Allergies have been reviewed and are listed on Medical History Questionnaire. Candido Dubon lives with spouse in a single story home with stairs and no handrail to enter. 2 MARIANA without HR. Has shower seat, ,rolling walker, quad cane, life alert monitor    Task Previous Current   ADLs  Modified Independent Modified Independent shower seat with 2 grab bar. Does take bath, denies difficulty getting in/out of bathtub   IADL's Independent Modified Independent  does most of grocery shopping, patient does cook- difficulty carrying heavy casserole , patient reports doing laundry- able to carry light laundry basket   Ambulation Independent Modified Independent walking limited to 10 minutes if not using walker.  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 Driving Active  Active    Work has not worked since stroke in 2012  Unemployed       OBJECTIVE:  Pain L shoulder pain when waking up in mornin/10 every morning then goes away after 10 minutes   Palpation WNL   Observation Patient generally frail, admits to mentioning to MD losing weight over past year, advised to drink ensure which patient reports \"I only do sometimes\"   Posture fair        Range of Motion Hip: R hip flexion 30, L hip flexion 25  Knee: R knee 0- 130 , L knee 0- 120 degrees,   Ankle: R ankle DF 5, PF 30, L ankle DF neutral, PF 30 degrees   Strength Right Upper Extremity:   Impaired - R hip flexion 3/5, knee and ankle 4-/5  Left Upper Extremity:  Impaired - L hip flexion 3-/5, knee and ankle 3+/5   Coordination Impaired - slow rapid toe tapping L>R   Sensation WFL   Bed Mobility WFL   Transfers Impaired - heavy use of arms sit to stand   Ambulation Contact Guard Assistance  Distance: 100  Surface: Level Tile  Device:Quad Cane  Gait Deviations: Forward Flexed Posture, Wide Base of Support, Mild Path Deviations and carrying cane in hallway and to PT treatment area, not putting on ground. PT advised to use RW at all times, had patient borrow clinic RW to /from speech, note small steps and pushing walker too far in front.   Patient reports has 2 different RW, 1 heavier than other, difficulty \"lifting this over the carpet\", PT advised to use lighter RW and leave on ground, do not  but slide over different surfaces   Balance Tinetti:                TREATMENT   Precautions:    Pain:     X in shaded column indicates activity completed today   Modalities Parameters/  Location  Notes                     Manual Therapy Time/Technique  Notes                     Exercise/Intervention   Notes   B LE exercises ankle pump 10  x Cues to count out loud for each exercise   Heel slide 10  x    SLR 10  x    LAQ 10  x    Education to use RW at all times                                                   Specific Interventions Next Treatment:     Activity/Treatment Tolerance:  []  Patient tolerated treatment well  []  Patient limited by fatigue  []  Patient limited by pain   []  Patient limited by medical complications  []  Other:     Assessment: PT for exercises to improve AROM, strength, balance, and gait. Anticipate starting with 5 weeks , then PN to determine if further therapy needed. PT notes that patient feels her walking is about the same as last year. Patient admits to having difficulty with cancelling therapy for shoulder last year due to long history of bowel issues, PT did give patient a copy of attendance agreement and reviewed, if unable to come to therapy session, therapy will not help  Body Structures/Functions/Activity Limitations: impaired balance, impaired ROM, impaired strength, pain and abnormal gait  Prognosis: fair    GOALS:  Patient Goal: the doctor told me to come    Short Term Goals:  Time Frame: 5 weeks  1. Increase AROM B hip flexion to 40, B ankle DF to 10 degrees, to allow patient to walk in/out of clinic with appropriate assistive device with no difficulty going over threshold/rugs  2. increase strength B hip flexion to 3+/5, L ankle DF to 3+/5 to allow patient to report able to walk x 30 minutes at store with cart without rest break  3. I with HEP as prescribed to allow patient to improve Tinetti score balance to 18/28 with no falls x 3 weeks      Long Term Goals:  Time Frame: 8 weeks  1. Increase AROM B hip flexion to 50, L knee flexion to 130 degrees to allow patient to report able to walk household distance without device with no LOB  2. Increase strength B LE to 4-/5 to allow patient to report able to walk for exercise for 10 minutes without fall or rest break  3. I with HEP as prescribed to allow patient to improve balance Tinetti score 22/28 with patient to report no falls x 6 weeks  Patient Education:   [x]  HEP/Education Completed: Plan of Care, Goals, [address] [facility]. Access Code:[email or web address]  []  No new Education completed  []  Reviewed Prior HEP      []  Patient verbalized and/or demonstrated understanding of education provided. []  Patient unable to verbalize and/or demonstrate understanding of education provided. Will continue education. [x]  Barriers to learning: memory issues- needs handouts    PLAN:  Treatment Recommendations: Strengthening, Range of Motion, Balance Training, Endurance Training, Gait Training, Home Exercise Program, Patient Education and Safety Education and Training    [x]  Plan of care initiated. Plan to see patient 2 times per week for 8 weeks to address the treatment planned outlined above.   []  Continue with current plan of care  []  Modify plan of care as follows:    []  Hold pending physician visit  []  Discharge    Time In 815   Time Out 900   Timed Code Minutes: 25 min   Total Treatment Time: 50 min       Electronically Signed by: Dory Huerta PT

## 2022-04-28 ENCOUNTER — HOSPITAL ENCOUNTER (OUTPATIENT)
Dept: OCCUPATIONAL THERAPY | Age: 67
Setting detail: THERAPIES SERIES
Discharge: HOME | End: 2022-04-28
Payer: MEDICARE

## 2022-04-28 PROCEDURE — 97165 OT EVAL LOW COMPLEX 30 MIN: CPT

## 2022-04-28 NOTE — PROGRESS NOTES
** PLEASE SIGN, DATE AND TIME CERTIFICATION BELOW AND RETURN TO [facility] OUTPATIENT REHABILITATION (FAX #: [phone]). ATTEST/CO-SIGN IF ACCESSING VIA [employer]. THANK YOU.**    I certify that I have examined the patient below and determined that Physical Medicine and Rehabilitation service is necessary and that I approve the established plan of care for up to 90 days or as specifically noted. Attestation, signature or co-signature of physician indicates approval of certification requirements.    ________________________ ____________ __________  Physician Signature   Date   Time   [address] THERAPY  [x] EVALUATION  [] DAILY NOTE (LAND) [] DAILY NOTE (AQUATIC ) [] PROGRESS NOTE [] DISCHARGE NOTE    [] [facility]   [x] [address]    [] [address] [facility]   [] Merlinda Box    Date: 2022  Patient Name:  Etta Carey  : 1955  MRN: [id number]  CSN: [id number]    Referring Practitioner Deborah Layne MD   Diagnosis At risk for falling [Z91.81]  Imbalance [R26.89]  Poor short term memory [R41.3]  Cerebral infarction due to thrombosis of precerebral artery ([facility] [address] [I63.00]    Treatment Diagnosis Left sided weakness   Date of Evaluation 22      Functional Outcome Measure Used FIQ   Functional Outcome Score 95/104 (22)       Insurance: Primary: Payor: MEDICARE /  /  / ,   Secondary: [facility]   Authorization Information: PRECERTIFICATION REQUIRED:  No  INSURANCE THERAPY BENEFIT:  Patient has unlimited visits based on medical necessity  Benefit will not cover maintenance or preventative treatment.   AQUATIC THERAPY COVERED:   Yes  MODALITIES COVERED:  Yes, with the exception of iontophoresis and hot packs/cold packs  TELEHEALTH COVERED: Yes   Visit # 1, 1/10 for progress note   Visits Allowed: Unlimited   Recertification Date: 3/27/89   Physician Follow-Up: Follows up with Dr. Rosanna Milan in 2022 Physician Orders: Evaluate and treat   Pertinent History: Patient had CVA 5 or 10 years ago. Patient states that she was in [place] and was in the hospital for a few days and then had outpatient therapy at that time. Patient states that she was told to use an assistive device, but she hasn't used it consistently. States that she fell about a month ago and hit her head. States that she didn't seek medical attention at that time. Comorbidities include HTN, anxiety, memory issues, fibromyalgia, stroke, and depression that could influence rehab process. SUBJECTIVE:  present for evaluation. Patient states that she fell last September and broke her right arm. States that she had surgery on her right shoulder and had therapy at [address] [place] [address] for 3 months. States that she has had pain in left arm and chest when she wakes in the morning. States that she has been checked by the cardiologist.    Social/Functional History: Kelly Garza lives with spouse. Patient does wear life alert when home alone. Task Prior Level of Function  (current level of function addressed below)   ADLs  Independent   Ambulation Modified Independent   Transfers Modified Independent   Hobbies Watch TV, [employer], has 3 dogs, playing cards   Driving Drives with self-imposed restrictions - drive short distances only   Work Patient relates that she hasn't worked since having the stroke     OBJECTIVE:  Hand Dominance right handed       Vision Patient states that she will be seeing the eye doctor tomorrow for eye examination. Denies double vision   Hearing [facility]   Cognition  Patient looked to  for many answers during the evaluation. Patient did have some difficulty with historical questions regarding medical condition. ADL's Patient reports that she is able to dress and bathe herself.  relates that he does most of the housework.  Patient does relate that she does some cooking but admits that she doesn't check the things on the stove frequently enough. Patient and  report that she has severe difficulty with bowel management and is under medical care - relates that this is related to her stroke.  relates that her bowel functions at 20%. Tub/Shower Set-Up Tub/Shower Combo and Shower Curtain   Toilet Set-Up Standard Height Toilet   Bathroom Equipment Grab bars in shower and Hand-held shower head   Adaptive Equipment Used [address] [facility] Sitting Balance:  Modified Independent. Standing Balance: Contact Guard Assistance. Functional Mobility Assistive Device: 4 Wheeled Walker  Assist Level:  Supervision. Sensation Right Upper Extremity: Intact. and Left Upper Extremity: Intact. Coordination 9 Hole Peg Test:   Right: 34 seconds     Left:   41 seconds. Tone Bilateral Upper Extremity Tone:  Normal       Bilateral UE AROM is WFL.      UE STRENGTH    Right Left COMMENTS   Shoulder Flexion 4/5 4+/5    Elbow Extension 4+/5 4+/5    Elbow Flexion 4+/5 4+/5           RIGHT LEFT    Strength Settin  26# 26#   Pinch Strength        Lateral Pinch  6# 7#    3 Point Pinch  7# 7#         No treatment completed this date  TREATMENT   Precautions: HTN, anxiety, depression, anxiety   Pain:  No pain at present time    X in shaded column indicates Activity Completed Today   Modalities Parameters/  Location  Notes/Comments                     Manual Therapy Time/  Technique  Notes/Comments                     Exercises   Sets/  Sec Reps  Notes/Comments                                                    Activities Time    Notes/Comments                       Specific Interventions Next Treatment: coordination,  strengthening, pinch strengthening, advanced ADL/safety    Activity/Treatment Tolerance:  [x]  Patient tolerated treatment well  []  Patient limited by fatigue  []  Patient limited by pain   []  Patient limited by other medical complications  []  Other:     Assessment: Patient meets criteria for low complexity evaluation based on documented evaluation findings. Patient does present with memory issues, decreased coordination,  strength, and pinch strength. Skilled therapy services are required to address ADL safety, home safety, /pinch strength, and coordination to assist in increasing patient's independence and safety at home. Areas for Improvement: impaired cognition, impaired coordination, impaired safety awareness, impaired strength and impaired ADL  Prognosis: fair    GOALS:  Patient Goal: To get stronger. To be able to do more in the house. Short Term Goals:  Time Frame: 4 weeks  1. Be independent with HEP as instructed to increase her ability to use can opener. 2. Be able to stand to complete simple UE activity without assistance and no loss of balance. 3. Increase bilateral  strength to at least 30# to increase her ability to open containers. Long Term Goals:  Time Frame: 12 weeks  1. Patient and  will report follow through with home safety recommendations to assist with patient's independence. 2. Improve bilateral hand coordination as evidenced by her ability to complete 9 hole peg test with right hand in  30 seconds and left hand in 37 seconds to increase her ability to complete basic household chores. Patient Education:   [x]  HEP/Education Completed: Plan of Care, Goals,      []  No new Education completed  []  Reviewed Prior HEP      [x]  Patient verbalized and/or demonstrated understanding of education provided. []  Patient unable to verbalize and/or demonstrate understanding of education provided. Will continue education. [x]  Barriers to learning: decreased memory    PLAN:  Treatment Recommendations: Strengthening, Home Exercise Program, Patient Education, Safety Education and Training, Self-Care Education and Training and coordination    [x]  Plan of care initiated.   Plan to see patient 2 times per week for  12 weeks to address the treatment planned outlined above.   []  Continue with current plan of care  []  Modify plan of care as follows:    []  Hold pending physician visit  []  Discharge    Time In 1540   Time Out 1630   Timed Code Minutes: 0 min   Total Treatment Time: 50 min       Geary Homans, OTR/L #[id number]

## 2022-05-02 NOTE — TELEPHONE ENCOUNTER
Emile vargas and their Renetta puppy ate one of Deloris's Plavix that fell on the floor , they are concerned if this is going to harm the puppy . They had called the vet who told them to call the PCP.

## 2022-05-03 ENCOUNTER — APPOINTMENT (OUTPATIENT)
Dept: OCCUPATIONAL THERAPY | Age: 67
End: 2022-05-03
Payer: MEDICARE

## 2022-05-03 ENCOUNTER — HOSPITAL ENCOUNTER (OUTPATIENT)
Dept: PHYSICAL THERAPY | Age: 67
Setting detail: THERAPIES SERIES
End: 2022-05-03
Payer: MEDICARE

## 2022-05-03 ENCOUNTER — HOSPITAL ENCOUNTER (OUTPATIENT)
Dept: SPEECH THERAPY | Age: 67
Setting detail: THERAPIES SERIES
End: 2022-05-03
Payer: MEDICARE

## 2022-05-04 DIAGNOSIS — N18.4 STAGE 4 CHRONIC KIDNEY DISEASE (HCC): Primary | ICD-10-CM

## 2022-05-05 ENCOUNTER — HOSPITAL ENCOUNTER (OUTPATIENT)
Dept: PHYSICAL THERAPY | Age: 67
Setting detail: THERAPIES SERIES
Discharge: HOME | End: 2022-05-05
Payer: MEDICARE

## 2022-05-05 ENCOUNTER — HOSPITAL ENCOUNTER (OUTPATIENT)
Dept: OCCUPATIONAL THERAPY | Age: 67
Setting detail: THERAPIES SERIES
Discharge: HOME | End: 2022-05-05
Payer: MEDICARE

## 2022-05-05 ENCOUNTER — HOSPITAL ENCOUNTER (OUTPATIENT)
Dept: SPEECH THERAPY | Age: 67
Setting detail: THERAPIES SERIES
End: 2022-05-05
Payer: MEDICARE

## 2022-05-05 PROCEDURE — 97110 THERAPEUTIC EXERCISES: CPT

## 2022-05-05 NOTE — PROGRESS NOTES
[address] THERAPY  [] EVALUATION  [x] DAILY NOTE (LAND) [] DAILY NOTE (AQUATIC ) [] PROGRESS NOTE [] DISCHARGE NOTE    [] [address] [facility]   [x] [address]    [] [facility]   [] Ana Alberta    Date: 2022  Patient Name:  Arthea Boeck  : 1955  MRN: [id number]  CSN: [id number]    Referring Practitioner Britt Bobo MD   Diagnosis At risk for falling [Z91.81]  Imbalance [R26.89]  Poor short term memory [R41.3]  Cerebral infarction due to thrombosis of precerebral artery ([facility] [address] [I63.00]    Treatment Diagnosis Left sided weakness   Date of Evaluation 22      Functional Outcome Measure Used FIQ   Functional Outcome Score 95/104 (22)       Insurance: Primary: Payor: MEDICARE /  /  / ,   Secondary: BC[employer]   Authorization Information: PRECERTIFICATION REQUIRED:  No  INSURANCE THERAPY BENEFIT:  Patient has unlimited visits based on medical necessity  Benefit will not cover maintenance or preventative treatment. AQUATIC THERAPY COVERED:   Yes  MODALITIES COVERED:  Yes, with the exception of iontophoresis and hot packs/cold packs  TELEHEALTH COVERED: Yes   Visit # 2, 2/10 for progress note   Visits Allowed: Unlimited   Recertification Date:    Physician Follow-Up: Follows up with Dr. Melanie Lizama in 2022   Physician Orders: Evaluate and treat   Pertinent History: Patient had CVA 5 or 10 years ago. Patient states that she was in [place] and was in the hospital for a few days and then had outpatient therapy at that time. Patient states that she was told to use an assistive device, but she hasn't used it consistently. States that she fell about a month ago and hit her head. States that she didn't seek medical attention at that time. Comorbidities include HTN, anxiety, memory issues, fibromyalgia, stroke, and depression that could influence rehab process.      SUBJECTIVE: States that she wants to be able to plant flowers this spring. OBJECTIVE:      TREATMENT   Precautions: HTN, anxiety, depression, anxiety   Pain:  No pain at present time    X in shaded column indicates Activity Completed Today   Modalities Parameters/  Location  Notes/Comments                     Manual Therapy Time/  Technique  Notes/Comments                     Exercises   Sets/  Sec Reps  Notes/Comments   ergogripper with 2 blue and 1 red band 1 20 with each UE x                                              Activities Time    Notes/Comments   Used both hands to place small plastic pegs into pegboard  x Had more difficulty completing activity with left hand versus right hand   Had patient  pegs one color at a time and nested pegs in palm  x Required repeating of instructions   Discussion regarding how to safely plant flowers at home  x Recommended for patient to plant ramos in pots versus the ground and to use her walker at all times at home (inside and outside)     Specific Interventions Next Treatment: coordination,  strengthening, pinch strengthening, advanced ADL/safety    Activity/Treatment Tolerance:  [x]  Patient tolerated treatment well  []  Patient limited by fatigue  []  Patient limited by pain   []  Patient limited by other medical complications  []  Other:     Assessment: Patient is progressing toward goals slowly. GOALS:  Patient Goal: To get stronger. To be able to do more in the house. Short Term Goals:  Time Frame: 4 weeks  1. Be independent with HEP as instructed to increase her ability to use can opener. 2. Be able to stand to complete simple UE activity without assistance and no loss of balance. 3. Increase bilateral  strength to at least 30# to increase her ability to open containers. Long Term Goals:  Time Frame: 12 weeks  1. Patient and  will report follow through with home safety recommendations to assist with patient's independence.   2. Improve bilateral hand coordination as evidenced by her ability to complete 9 hole peg test with right hand in  30 seconds and left hand in 37 seconds to increase her ability to complete basic household chores. Patient Education:   [x]  HEP/Education Completed: recommendation of planting flowers in pots versus ground and to use walker at all times      []  No new Education completed  []  Reviewed Prior HEP      [x]  Patient verbalized and/or demonstrated understanding of education provided. []  Patient unable to verbalize and/or demonstrate understanding of education provided. Will continue education. [x]  Barriers to learning: decreased memory    PLAN:      []  Plan of care initiated. Plan to see patient 2 times per week for  12 weeks to address the treatment planned outlined above.   [x]  Continue with current plan of care  []  Modify plan of care as follows:    []  Hold pending physician visit  []  Discharge    Time In 1000   Time Out 1030   Timed Code Minutes: 30 min   Total Treatment Time: 30 min       David Clark OTR/L #[id number]

## 2022-05-05 NOTE — PROGRESS NOTES
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 [I63.00]  Other abnormalities of gait and mobility [R26.89]    Treatment Diagnosis LE weakness, decreased balance, fall risk   Date of Evaluation 22   Additional Pertinent History Poor historian-Fall over dog with R shoulder fracture  with ORIF , neck surgery 25 years ago, HTN, anxiety, irritable bowel syndrome, stroke + , memory issues, stage 4 kidney disease      Functional Outcome Measure Used Tinetti balance test   Functional Outcome Score eval  (22)       Insurance: Primary: Payor: Ricki Torrez /  /  / ,   Secondary: [facility]   Authorization Information: Pays at 80%, modalities covered except ionto/MHP/CP not covered   Visit # 2, 2/10 for progress note   Visits Allowed: unlimited   Recertification Date:    Physician Follow-Up:  f/u with Kidney MD, 8/15 f/u with heart MD   Physician Orders:    History of Present Illness: Patient is very poor historian, no family present. Patient showed up for OT eval on  at 3:30 but date scheduled was . Patient reports using a RW when outside house but no device in house. Patient denies any recent falls but when at wellness check with family MD  patient reported having a recent fall, backwards in bedroom and hit head on cedar chest.   Patient reports at beginning of eval that right side was weaker since strokes in  but per notes by MD left side affected more.   Patient reports that she does drive, [place], does not drive in [address] . Patient reports \"I'm supposed to use a RW but can't get RW in/out of car. \"  Patient brought in quad cane \"but I don't use this at all, I'm supposed to use a RW but I don't. \"       SUBJECTIVE: Patient admits to not using rolator all the time while in the house. Denies pain today and reporting good compliance with HEP. OBJECTIVE:    TREATMENT   Precautions:    Pain:     X in shaded column indicates activity completed today   Modalities Parameters/  Location  Notes                     Manual Therapy Time/Technique  Notes                     Exercise/Intervention   Notes   NuStep Level 1 3 min 30 s  x Had to stop due to fatigue in legs   B LE exercises ankle pump 10  x Cues to count out loud for each exercise   Heel slide 10  x    SAQ  10x5s  x    SLR 10  x    LAQ 10  x    Side lying clam shell 10  x    Seated heel/toe raises  15  x                  Standing:       Heel/Toes raises  10  x    Marching 10  x    Hip abduction  10  x                    Specific Interventions Next Treatment:     Activity/Treatment Tolerance:  []  Patient tolerated treatment well  []  Patient limited by fatigue  []  Patient limited by pain   []  Patient limited by medical complications  []  Other:     Assessment: Progressed with exercises as noted with patient having moderate fatigue throughout and at end of session. Patient needing multiple cues to count reps out loud. Patient denies pain at end of session. GOALS:  Patient Goal: the doctor told me to come    Short Term Goals:  Time Frame: 5 weeks  1. Increase AROM B hip flexion to 40, B ankle DF to 10 degrees, to allow patient to walk in/out of clinic with appropriate assistive device with no difficulty going over threshold/rugs  2. increase strength B hip flexion to 3+/5, L ankle DF to 3+/5 to allow patient to report able to walk x 30 minutes at store with cart without rest break  3.   I with HEP as prescribed to allow patient to improve Tinetti score balance to 18/28 with no falls x 3 weeks      Long Term Goals:  Time Frame: 8 weeks  1. Increase AROM B hip flexion to 50, L knee flexion to 130 degrees to allow patient to report able to walk household distance without device with no LOB  2. Increase strength B LE to 4-/5 to allow patient to report able to walk for exercise for 10 minutes without fall or rest break  3. I with HEP as prescribed to allow patient to improve balance Tinetti score 22/28 with patient to report no falls x 6 weeks  Patient Education:   [x]  HEP/Education Completed: continue with HEP and monitor response to progressions. 10 [facility] YSAT:[id number]  []  No new Education completed  []  Reviewed Prior HEP      []  Patient verbalized and/or demonstrated understanding of education provided. []  Patient unable to verbalize and/or demonstrate understanding of education provided. Will continue education. [x]  Barriers to learning: memory issues- needs handouts    PLAN:  Treatment Recommendations: Strengthening, Range of Motion, Balance Training, Endurance Training, Gait Training, Home Exercise Program, Patient Education and Safety Education and Training      Plan to see patient 2 times per week for 8 weeks to address the treatment planned outlined above.   [x]  Continue with current plan of care  []  Modify plan of care as follows:    []  Hold pending physician visit  []  Discharge    Time In 933   Time Out 958   Timed Code Minutes: 25 min   Total Treatment Time: 25 min       Electronically Signed by: Ricky Kay PTA

## 2022-05-06 ENCOUNTER — TELEPHONE (OUTPATIENT)
Dept: FAMILY MEDICINE CLINIC | Age: 67
End: 2022-05-06

## 2022-05-06 ENCOUNTER — HOSPITAL ENCOUNTER (OUTPATIENT)
Age: 67
Discharge: HOME | End: 2022-05-06
Payer: MEDICARE

## 2022-05-06 DIAGNOSIS — N18.4 STAGE 4 CHRONIC KIDNEY DISEASE (HCC): ICD-10-CM

## 2022-05-06 DIAGNOSIS — R92.8 ABNORMAL MAMMOGRAM: ICD-10-CM

## 2022-05-06 DIAGNOSIS — Z91.81 AT RISK FOR FALLING: Primary | ICD-10-CM

## 2022-05-06 DIAGNOSIS — Z12.31 ENCOUNTER FOR SCREENING MAMMOGRAM FOR MALIGNANT NEOPLASM OF BREAST: ICD-10-CM

## 2022-05-06 LAB
ANION GAP SERPL CALCULATED.3IONS-SCNC: 11 MEQ/L (ref 8–16)
BUN BLDV-MCNC: 22 MG/DL (ref 7–22)
CALCIUM SERPL-MCNC: 8.8 MG/DL (ref 8.5–10.5)
CHLORIDE BLD-SCNC: 107 MEQ/L (ref 98–111)
CO2: 27 MEQ/L (ref 23–33)
CREAT SERPL-MCNC: 1.8 MG/DL (ref 0.4–1.2)
GFR SERPL CREATININE-BSD FRML MDRD: 28 ML/MIN/1.73M2
GLUCOSE BLD-MCNC: 93 MG/DL (ref 70–108)
POTASSIUM SERPL-SCNC: 4.1 MEQ/L (ref 3.5–5.2)
SODIUM BLD-SCNC: 145 MEQ/L (ref 135–145)

## 2022-05-06 PROCEDURE — 36415 COLL VENOUS BLD VENIPUNCTURE: CPT

## 2022-05-06 PROCEDURE — 80048 BASIC METABOLIC PNL TOTAL CA: CPT

## 2022-05-06 NOTE — TELEPHONE ENCOUNTER
Patient came in requesting an order for a mammogram [place]. And she is also wanting to know if you could write her a prescription for a walker.  Any question call her at #[phone]

## 2022-05-09 NOTE — TELEPHONE ENCOUNTER
There are various options for walker  -- roller, standing  -- seat or no seat  -- basket or no    What is she needing?

## 2022-05-09 NOTE — TELEPHONE ENCOUNTER
Spoke with Drew Carbajal , she needs a Rolling walker with a seat , no basket . I have it pended , also pended a Mammo .  Need to fax Aman Ovalles order to [address] [place] [address].

## 2022-05-10 ENCOUNTER — APPOINTMENT (OUTPATIENT)
Dept: OCCUPATIONAL THERAPY | Age: 67
End: 2022-05-10
Payer: MEDICARE

## 2022-05-10 ENCOUNTER — APPOINTMENT (OUTPATIENT)
Dept: SPEECH THERAPY | Age: 67
End: 2022-05-10
Payer: MEDICARE

## 2022-05-10 ENCOUNTER — HOSPITAL ENCOUNTER (OUTPATIENT)
Dept: SPEECH THERAPY | Age: 67
Setting detail: THERAPIES SERIES
End: 2022-05-10
Payer: MEDICARE

## 2022-05-10 ENCOUNTER — HOSPITAL ENCOUNTER (OUTPATIENT)
Dept: PHYSICAL THERAPY | Age: 67
Setting detail: THERAPIES SERIES
End: 2022-05-10
Payer: MEDICARE

## 2022-05-10 NOTE — TELEPHONE ENCOUNTER
Patient notified of script for walker being sent to [facility].

## 2022-05-11 ENCOUNTER — TELEPHONE (OUTPATIENT)
Dept: FAMILY MEDICINE CLINIC | Age: 67
End: 2022-05-11

## 2022-05-11 DIAGNOSIS — R92.8 ABNORMAL MAMMOGRAM: Primary | ICD-10-CM

## 2022-05-12 ENCOUNTER — TELEPHONE (OUTPATIENT)
Dept: FAMILY MEDICINE CLINIC | Age: 67
End: 2022-05-12

## 2022-05-12 ENCOUNTER — OFFICE VISIT (OUTPATIENT)
Dept: NEPHROLOGY | Age: 67
End: 2022-05-12
Payer: MEDICARE

## 2022-05-12 VITALS
SYSTOLIC BLOOD PRESSURE: 139 MMHG | WEIGHT: 117 LBS | BODY MASS INDEX: 22.97 KG/M2 | HEART RATE: 83 BPM | HEIGHT: 60 IN | DIASTOLIC BLOOD PRESSURE: 75 MMHG | OXYGEN SATURATION: 100 % | TEMPERATURE: 98.4 F

## 2022-05-12 DIAGNOSIS — Z12.31 SCREENING MAMMOGRAM, ENCOUNTER FOR: Primary | ICD-10-CM

## 2022-05-12 DIAGNOSIS — I10 ESSENTIAL HYPERTENSION: ICD-10-CM

## 2022-05-12 DIAGNOSIS — N18.4 STAGE 4 CHRONIC KIDNEY DISEASE (HCC): Primary | ICD-10-CM

## 2022-05-12 PROCEDURE — G8420 CALC BMI NORM PARAMETERS: HCPCS | Performed by: INTERNAL MEDICINE

## 2022-05-12 PROCEDURE — 99213 OFFICE O/P EST LOW 20 MIN: CPT | Performed by: INTERNAL MEDICINE

## 2022-05-12 PROCEDURE — 1123F ACP DISCUSS/DSCN MKR DOCD: CPT | Performed by: INTERNAL MEDICINE

## 2022-05-12 PROCEDURE — 4040F PNEUMOC VAC/ADMIN/RCVD: CPT | Performed by: INTERNAL MEDICINE

## 2022-05-12 PROCEDURE — 1090F PRES/ABSN URINE INCON ASSESS: CPT | Performed by: INTERNAL MEDICINE

## 2022-05-12 PROCEDURE — 3017F COLORECTAL CA SCREEN DOC REV: CPT | Performed by: INTERNAL MEDICINE

## 2022-05-12 PROCEDURE — G8427 DOCREV CUR MEDS BY ELIG CLIN: HCPCS | Performed by: INTERNAL MEDICINE

## 2022-05-12 PROCEDURE — 1036F TOBACCO NON-USER: CPT | Performed by: INTERNAL MEDICINE

## 2022-05-12 PROCEDURE — G8399 PT W/DXA RESULTS DOCUMENT: HCPCS | Performed by: INTERNAL MEDICINE

## 2022-05-12 NOTE — TELEPHONE ENCOUNTER
[facility] calls and since \[facility]\"" has not had a Mammo screening in a few years and they suggested that is done first . Mammo screening was ordered , message left for \[facility]\"" that Mammo was changed to screening not Diagnostic.

## 2022-05-12 NOTE — PROGRESS NOTES
[facility]        Outpatient Follow-Up note         5/12/2022 10:59 AM    Patient Name:   Shira Nunn  YOB: 1955  Primary Care Physician:  Lorenza Kunz MD      Chief Complaint / Reason for follow-up : Follow Up of CKD     Interval History :  Patient seen and examined by me. Feels well. No CP or SOB .   No hospitalizations and no med changes      Past History :  Past Medical History:   Diagnosis Date    Allergic rhinitis     Anxiety     CVA (cerebral infarction)     Depression     Fibromyalgia     Headache(784.0)     Hyperlipidemia     Hypertension     Irritable bowel syndrome     Kidney failure     Unspecified cerebral artery occlusion with cerebral infarction     Unspecified sleep apnea      Past Surgical History:   Procedure Laterality Date    CARPAL TUNNEL RELEASE Right     [facility]  2012    [facility]    ENDOSCOPY, COLON, DIAGNOSTIC  unsure    EYE SURGERY      lasik    HEMORRHOID SURGERY  unsure    HYSTERECTOMY      total    NECK SURGERY  18  years ago    fusion    SINUS SURGERY  10 years ago    TUBAL LIGATION          Medications :     Outpatient Medications Marked as Taking for the 5/12/22 encounter (Office Visit) with Arian Damon MD   Medication Sig Dispense Refill    simvastatin (ZOCOR) 20 MG tablet TAKE 1 TABLET BY MOUTH DAILY 90 tablet 3    clopidogrel (PLAVIX) 75 MG tablet TAKE 1 TABLET BY MOUTH DAILY 90 tablet 2    chlordiazePOXIDE-clidinium (LIBRAX) 5-2.5 MG per capsule TAKE 1 CAPSULE TWICE A DAY AS NEEDED FOR PAIN 180 capsule 3    amLODIPine (NORVASC) 5 MG tablet TAKE 1 TABLET BY MOUTH DAILY 90 tablet 3    buPROPion (WELLBUTRIN SR) 200 MG extended release tablet Take 1 tablet by mouth 2 times daily 180 tablet 3    traZODone (DESYREL) 50 MG tablet TAKE 1 TABLET NIGHTLY 90 tablet 3    divalproex (DEPAKOTE ER) 250 MG extended release tablet TAKE 1 TABLET BY MOUTH DAILY      bisacodyl (DULCOLAX) 5 MG EC tablet Take 5 mg by mouth daily as needed for Constipation      simethicone (MI-ACID GAS RELIEF) 80 MG chewable tablet Take 80 mg by mouth 2 times daily      triamcinolone (NASACORT ALLERGY 24HR) 55 MCG/ACT nasal inhaler 2 sprays by Each Nostril route daily As needed      Polyethylene Glycol 3350 (MIRALAX PO) Take by mouth      cetirizine (ZYRTEC ALLERGY) 10 MG tablet Take 1 tablet by mouth daily 30 tablet 11    Tetrahydrozoline-Zn Sulfate (EYE DROPS ALLERGY RELIEF OP) Apply 1 drop to eye daily      Probiotic Product (PROBIOTIC-10) CHEW Take by mouth daily      linaclotide (LINZESS) 290 MCG CAPS capsule Take 290 mcg by mouth every morning (before breakfast)       denosumab (PROLIA) 60 MG/ML SOLN SC injection Inject 60 mg into the skin once      folic acid (FOLVITE) 259 MCG tablet Take 400 mcg by mouth daily      b complex vitamins capsule Take 1 capsule by mouth daily 100 capsule 3    FISH OIL 1,000 mg by Does not apply route 2 times daily Indications: Decreased Energy       aspirin 81 MG tablet Take 81 mg by mouth daily.            Vitals     /75 (Site: Left Upper Arm, Position: Sitting, Cuff Size: Small Adult)   Pulse 83   Temp 98.4 °F (36.9 °C)   Ht 5' (1.524 m)   Wt 117 lb (53.1 kg)   SpO2 100%   BMI 22.85 kg/m²  Wt Readings from Last 3 Encounters:   05/12/22 117 lb (53.1 kg)   04/12/22 122 lb (55.3 kg)   03/10/22 123 lb (55.8 kg)        Physical Exam     General -- no distress  Lungs -- clear  Heart -- S1, S2 heard, JVD - no  Abdomen - soft, non-tender  Extremities -- no edema  CNS - awake and alert    Labs, Radiology and Tests    Labs -      1/18 7/18 12/18 8/19 7/20 1/20 6/21 11/21 5/22      Potassium  4.8  5.1  4.2  4.1  4.3 4.0 4.1 4.2 4.1      BUN  30  34  25  29  26 26 22 26 22      Creatinine  1.6  1.8  1.6  1.8  1.7 1.6 2.0 2.0 1.8      eGFR  32  28  32  28  30 32 25 25 28                           UPCR  < 200    < 200  100 110  < 300  < 300       UMCR  13      6  13                            12/18 - calcium 9.4 phosphorus 3.2 vitamin D 19 and PTH 83  3/9 - calcium - 9.4     Renal Ultrasound Scan -- 1/2018  Rt kidney 9 cm/ left kidney 9 cm. Hypoplastic kidneys . Elevated resistive indices . 5 mm cyst rt kidney      Other : old  lab data and PCP notes have been reviewed and noted patient's creatinine was around 1.5 in 2016 and in 2014 it was 0.7 A CT scan which was done in 2014 which was negative for any abnormalities of the kidney.     Assessment    1. Renal - as per MDRD patient's GFR is around 32 ML per minute minute. Recent worsening.   -Now GFR is running close to 28. May need progressive chronic kidney disease.    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 2. Essential hypertension -running well. 3. Electrolytes - WNL  4. Diabetes mellitus - not on any meds   5. History of stroke in 2013   6. Medications reviewed discussed with the patient and  in detail. 7. Follow-up in 6 months    Tests and orders placed this Encounter     Orders Placed This Encounter   Procedures   Misha Lopez M.D  [facility].

## 2022-05-13 ENCOUNTER — HOSPITAL ENCOUNTER (OUTPATIENT)
Dept: WOMENS IMAGING | Age: 67
Discharge: HOME | End: 2022-05-13
Payer: MEDICARE

## 2022-05-13 DIAGNOSIS — Z12.31 SCREENING MAMMOGRAM, ENCOUNTER FOR: ICD-10-CM

## 2022-05-13 PROCEDURE — 77063 BREAST TOMOSYNTHESIS BI: CPT

## 2022-05-17 ENCOUNTER — HOSPITAL ENCOUNTER (OUTPATIENT)
Dept: OCCUPATIONAL THERAPY | Age: 67
Setting detail: THERAPIES SERIES
Discharge: HOME | End: 2022-05-17
Payer: MEDICARE

## 2022-05-17 ENCOUNTER — HOSPITAL ENCOUNTER (OUTPATIENT)
Dept: PHYSICAL THERAPY | Age: 67
Setting detail: THERAPIES SERIES
Discharge: HOME | End: 2022-05-17
Payer: MEDICARE

## 2022-05-17 ENCOUNTER — HOSPITAL ENCOUNTER (OUTPATIENT)
Dept: SPEECH THERAPY | Age: 67
Setting detail: THERAPIES SERIES
Discharge: HOME | End: 2022-05-17
Payer: MEDICARE

## 2022-05-17 PROCEDURE — 97110 THERAPEUTIC EXERCISES: CPT

## 2022-05-17 PROCEDURE — 92507 TX SP LANG VOICE COMM INDIV: CPT

## 2022-05-17 NOTE — PROGRESS NOTES
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 [I63.00]  Other abnormalities of gait and mobility [R26.89]    Treatment Diagnosis LE weakness, decreased balance, fall risk   Date of Evaluation 22   Additional Pertinent History Poor historian-Fall over dog with R shoulder fracture  with ORIF , neck surgery 25 years ago, HTN, anxiety, irritable bowel syndrome, stroke + , memory issues, stage 4 kidney disease      Functional Outcome Measure Used Tinetti balance test   Functional Outcome Score eval  (22)       Insurance: Primary: Payor: [employer] [facility] [employer] [facility] /  /  / ,   Secondary: [employer]   Authorization Information: Pays at 80%, modalities covered except ionto/MHP/CP not covered   Visit # 3, 3/10 for progress note   Visits Allowed: unlimited   Recertification Date:    Physician Follow-Up:  f/u with Kidney MD, 8/15 f/u with heart MD   Physician Orders:    History of Present Illness: Patient is very poor historian, no family present. Patient showed up for OT eval on  at 3:30 but date scheduled was . Patient reports using a RW when outside house but no device in house. Patient denies any recent falls but when at wellness check with family MD  patient reported having a recent fall, backwards in bedroom and hit head on cedar chest.   Patient reports at beginning of eval that right side was weaker since strokes in  but per notes by MD left side affected more.   Patient reports that she does drive, short distance from [place] to [place], does not drive in [facility] [place] . Patient reports \"I'm supposed to use a RW but can't get RW in/out of car. \"  Patient brought in quad cane \"but I don't use this at all, I'm supposed to use a RW but I don't. \"       SUBJECTIVE: Patient admits to not using rolator all the time while in the house. Denies pain today and reporting good compliance with HEP. OBJECTIVE:    TREATMENT   Precautions:    Pain:     X in shaded column indicates activity completed today   Modalities Parameters/  Location  Notes                     Manual Therapy Time/Technique  Notes                     Exercise/Intervention   Notes   NuStep Level 1 4 min  x    B LE exercises ankle pump 15  x Cues to count out loud for each exercise   Heel slide 10  x    SAQ  15x5s  x    SLR 10  x    LAQ 15  x    Side lying clam shell 10  x    Seated heel/toe raises  15  x                  Standing:       Heel/Toes raises  10  x    Marching 10  x    Hip abduction  10  x    2inch step ups forward/lateral 10  x             Specific Interventions Next Treatment:     Activity/Treatment Tolerance:  []  Patient tolerated treatment well  []  Patient limited by fatigue  []  Patient limited by pain   []  Patient limited by medical complications  []  Other:     Assessment: Progressed with reps and added step ups with good tolerance. Patient denies increased pain and reporting feeling tired at end of session. GOALS:  Patient Goal: the doctor told me to come    Short Term Goals:  Time Frame: 5 weeks  1. Increase AROM B hip flexion to 40, B ankle DF to 10 degrees, to allow patient to walk in/out of clinic with appropriate assistive device with no difficulty going over threshold/rugs  2. increase strength B hip flexion to 3+/5, L ankle DF to 3+/5 to allow patient to report able to walk x 30 minutes at store with cart without rest break  3.   I with HEP as prescribed to allow patient to improve Tinetti score balance to 18/28 with no falls x 3 weeks      Long Term Goals:  Time Frame: 8 weeks  1. Increase AROM B hip flexion to 50, L knee flexion to 130 degrees to allow patient to report able to walk household distance without device with no LOB  2. Increase strength B LE to 4-/5 to allow patient to report able to walk for exercise for 10 minutes without fall or rest break  3. I with HEP as prescribed to allow patient to improve balance Tinetti score 22/28 with patient to report no falls x 6 weeks  Patient Education:   [x]  HEP/Education Completed: continue with HEP 2x's a day  10 [facility] Access Code:[email or web address]  []  No new Education completed  []  Reviewed Prior HEP      []  Patient verbalized and/or demonstrated understanding of education provided. []  Patient unable to verbalize and/or demonstrate understanding of education provided. Will continue education. [x]  Barriers to learning: memory issues- needs handouts    PLAN:  Treatment Recommendations: Strengthening, Range of Motion, Balance Training, Endurance Training, Gait Training, Home Exercise Program, Patient Education and Safety Education and Training      Plan to see patient 2 times per week for 8 weeks to address the treatment planned outlined above.   [x]  Continue with current plan of care  []  Modify plan of care as follows:    []  Hold pending physician visit  []  Discharge    Time In 1032   Time Out 1057   Timed Code Minutes: 25 min   Total Treatment Time: 25 min       Electronically Signed by: Diana Arreola PTA

## 2022-05-17 NOTE — PROGRESS NOTES
[address]  [] EVALUATION  [x] DAILY NOTE (LAND) [] DAILY NOTE (AQUATIC ) [] PROGRESS NOTE [] DISCHARGE NOTE    [] [address] [facility]   [x] [address]    [] [facility]   [] Percy Naik    Date: 2022  Patient Name:  Holly Goodman  : 1955  MRN: [id number]  CSN: [id number]    Referring Practitioner John Mcgrath MD   Diagnosis At risk for falling [Z91.81]  Imbalance [R26.89]  Poor short term memory [R41.3]  Cerebral infarction due to thrombosis of precerebral artery ([facility] [address] [I63.00]    Treatment Diagnosis Left sided weakness   Date of Evaluation 22      Functional Outcome Measure Used FIQ   Functional Outcome Score 95/104 (22)       Insurance: Primary: Payor: MEDICARE /  /  / ,   Secondary: [facility]   Authorization Information: PRECERTIFICATION REQUIRED:  No  INSURANCE THERAPY BENEFIT:  Patient has unlimited visits based on medical necessity  Benefit will not cover maintenance or preventative treatment. AQUATIC THERAPY COVERED:   Yes  MODALITIES COVERED:  Yes, with the exception of iontophoresis and hot packs/cold packs  TELEHEALTH COVERED: Yes   Visit # 3, 3/10 for progress note   Visits Allowed: Unlimited   Recertification Date:    Physician Follow-Up: Follows up with Dr. Josephine Ring in 2022   Physician Orders: Evaluate and treat   Pertinent History: Patient had CVA 5 or 10 years ago. Patient states that she was in [place] and was in the hospital for a few days and then had outpatient therapy at that time. Patient states that she was told to use an assistive device, but she hasn't used it consistently. States that she fell about a month ago and hit her head. States that she didn't seek medical attention at that time. Comorbidities include HTN, anxiety, memory issues, fibromyalgia, stroke, and depression that could influence rehab process.      SUBJECTIVE: Patient stated that her \"colonoscopy doctor\" suggested for her to have a \"massage to help the bowels. \" Patient states that she went 15 days without a bowel movement. States that she will be having a colonoscopy on 5/24. OBJECTIVE:      TREATMENT   Precautions: HTN, anxiety, depression, anxiety   Pain:  3-4/10 LOCATION: low back and neck    X in shaded column indicates Activity Completed Today   Modalities Parameters/  Location  Notes/Comments                     Manual Therapy Time/  Technique  Notes/Comments                     Exercises   Sets/  Sec Reps  Notes/Comments   ergogripper with 2 blue and 1 red band 1 20 with each UE     Reaching endurance activity for both UE strength and bilateral pinch/ strength 1  x Put on vertical post with right UE and removed with left UE   Medium theraputty - digging beads out using both UE   x    Medium theraputty - taffy pulls 1 3 minutes x Needed repeated instruction for proper completion                        Activities Time    Notes/Comments   Used both hands to place small plastic pegs into pegboard and removed one at a time  x    Had patient  pegs one color at a time and nested pegs in palm   Required repeating of instructions   Discussion regarding how to safely plant flowers at home   Recommended for patient to plant ramos in pots versus the ground and to use her walker at all times at home (inside and outside)     Specific Interventions Next Treatment: coordination,  strengthening, pinch strengthening, advanced ADL/safety    Activity/Treatment Tolerance:  [x]  Patient tolerated treatment well  []  Patient limited by fatigue  []  Patient limited by pain   []  Patient limited by other medical complications  []  Other:     Assessment: Patient is progressing toward goals slowly. GOALS:  Patient Goal: To get stronger. To be able to do more in the house. Short Term Goals:  Time Frame: 4 weeks  1.  Be independent with HEP as instructed to increase her ability to use can opener. 2. Be able to stand to complete simple UE activity without assistance and no loss of balance. 3. Increase bilateral  strength to at least 30# to increase her ability to open containers. Long Term Goals:  Time Frame: 12 weeks  1. Patient and  will report follow through with home safety recommendations to assist with patient's independence. 2. Improve bilateral hand coordination as evidenced by her ability to complete 9 hole peg test with right hand in  30 seconds and left hand in 37 seconds to increase her ability to complete basic household chores. Patient Education:   [x]  HEP/Education Completed: suggested for patient to find massage therapist who could do abdominal massage; informed patient that OTR would contact RN on inpatient rehab regarding bowel management program     []  No new Education completed  []  Reviewed Prior HEP      [x]  Patient verbalized and/or demonstrated understanding of education provided. []  Patient unable to verbalize and/or demonstrate understanding of education provided. Will continue education. [x]  Barriers to learning: decreased memory    PLAN:      []  Plan of care initiated. Plan to see patient 2 times per week for  12 weeks to address the treatment planned outlined above.   [x]  Continue with current plan of care  []  Modify plan of care as follows:    []  Hold pending physician visit  []  Discharge    Time In 1100   Time Out 1130   Timed Code Minutes: 30 min   Total Treatment Time: 30 min       MARICRUZ Bentley/KENDRA #[id number]

## 2022-05-17 NOTE — PROGRESS NOTES
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 Visit # 2, 2/10 for progress note   Visits Allowed: Unlimited   Recertification Date: 53   Physician Follow-Up: 10/12/22 - Dr. Gloria Diamond   Physician Orders: Evaluate and treat    Pertinent History: Patient has history of \"two strokes that occurred 13 years ago\". Per chart review, CVAs occurred in . Poor historian. Endorses presence of cognitive deficits since. Patient recently saw her PCP and she gave her three words to remember and after a few minutes she could only recall one of the three words. Patient also endorses trouble with her memory within her home environment. Reports she intermittently \"gets mixed up and says the wrong thing\". She states other people tell her she said something incorrectly and then she recognizes and is able to state \"I know what I meant but I said the wrong thing\". Endorses occasional problems using her tablet which her daughter then fixes. ST OP evaluation of cognitive-linguistic abilities this date.       SUBJECTIVE: Patient arrived with  Shruthi Echevarria for today's session. Pleasant and agreeable to all interventions and demonstrating understanding of education provided. SHORT TERM GOAL #1:  Goal 1: The patient will demonstrate understanding and use of 3+ trained word finding strategies given minimal cues to promote ability to independently utilize during moments of impaired lexical retrieval within conversation. INTERVENTIONS: SLP provided patient with list of \"Top 10 Word-Finding Strategies\" from [address] [place] [address]. SLP explained all techniques/strategies and verbally demonstrated how to use all listed. Patient demonstrating verbal understanding of all. She reports she currently uses \"delay\" and \"come back later\" techniques most often. SLP also educated patient on ability of communication partner providing \"hints\" to patient to assist with her recall vs giving the answer directly. Also educated on using \"disclosure statement\" or \"self-disclosing\" her desire to practice strategies within conversation with others if desired. Patient appears motivated to utilize techniques independently. SHORT TERM GOAL #2:  Goal 2: The patient will independently demonstrate understanding of and will implement 3+ memory/cognitive strategies/external aids within the home environment to promote improved recall and management of daily schedule. INTERVENTIONS: SLP provided patient with internal and external short-term memory strategy handout. SLP explained all strategies listed and demonstrated functional examples of each. Within discussion, patient with awareness of need for assistance with taking medications at consistent times each day, assistance with timing when cooking, and ability to re-organize constructed grocery list. SLP encouraged patient to set alarms on phone for medications, using snooze function vs dismiss when appropriate, using timers when cooking, and writing grocery list by category.  SLP also offered suggestion of color coding their implementation. Specific Interventions Next Treatment: education and training for word finding strategies, education and training on internal/external memory and cognitive strategies, executive function, planning, and sequencing exercises, memory exercises, attention exercises    Activity/Treatment Tolerance:  [x]  Patient tolerated treatment well  []  Patient limited by fatigue  []  Patient limited by pain   []  Patient limited by other medical complications  []  Other:     Patient Education:   [x]  HEP/Education Completed: Plan of Care, Goals, instruction to implement discussed cognitive/memory strategies  []  No new Education completed  []  Reviewed Prior HEP      [x]  Patient verbalized and/or demonstrated understanding of education provided. []  Patient unable to verbalize and/or demonstrate understanding of education provided. Will continue education. [x]  Barriers to learning: baseline cognitive impairments from previous stroke    PLAN:  []  Plan of care initiated. Plan to see patient 1 time per week for 6 weeks to address the treatment planned outlined above.   [x]  Continue with current plan of care  []  Modify plan of care as follows:    []  Hold pending physician visit  []  Discharge    Time In 0940   Time Out 1028   Timed Code Minutes: 0 min   Total Treatment Time: 50 min       Isiah Kat M.S., [address] [place] [address]

## 2022-05-19 ENCOUNTER — APPOINTMENT (OUTPATIENT)
Dept: SPEECH THERAPY | Age: 67
End: 2022-05-19
Payer: MEDICARE

## 2022-05-19 ENCOUNTER — HOSPITAL ENCOUNTER (OUTPATIENT)
Dept: OCCUPATIONAL THERAPY | Age: 67
Setting detail: THERAPIES SERIES
Discharge: HOME | End: 2022-05-19
Payer: MEDICARE

## 2022-05-19 ENCOUNTER — HOSPITAL ENCOUNTER (OUTPATIENT)
Dept: PHYSICAL THERAPY | Age: 67
Setting detail: THERAPIES SERIES
Discharge: HOME | End: 2022-05-19
Payer: MEDICARE

## 2022-05-19 PROCEDURE — 97110 THERAPEUTIC EXERCISES: CPT

## 2022-05-19 NOTE — PROGRESS NOTES
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 AQUATIC THERAPY COVERED:   Yes  MODALITIES COVERED:  Yes, with the exception of iontophoresis and hot packs/cold packs  TELEHEALTH COVERED: Yes   Visit # 4, 4/10 for progress note   Visits Allowed: Unlimited   Recertification Date: 49   Physician Follow-Up: Follows up with Dr. Vic Heaton in 2022   Physician Orders: Evaluate and treat   Pertinent History: Patient had CVA 5 or 10 years ago. Patient states that she was in [place] and was in the hospital for a few days and then had outpatient therapy at that time. Patient states that she was told to use an assistive device, but she hasn't used it consistently. States that she fell about a month ago and hit her head. States that she didn't seek medical attention at that time. Comorbidities include HTN, anxiety, memory issues, fibromyalgia, stroke, and depression that could influence rehab process.      SUBJECTIVE: Patient states that she might need to call Dr. Rosario Perez to get an injection in her left shoulder due to pain. OBJECTIVE:      TREATMENT   Precautions: HTN, anxiety, depression, anxiety   Pain:  5/10 LOCATION: left upper arm    X in shaded column indicates Activity Completed Today   Modalities Parameters/  Location  Notes/Comments                     Manual Therapy Time/  Technique  Notes/Comments                     Exercises   Sets/  Sec Reps  Notes/Comments   ergogripper with 2 blue and 1 red band 1 20 with each UE     Reaching endurance activity for both UE strength and bilateral pinch/ strength 1   Put on vertical post with right UE and removed with left UE   Medium theraputty - digging beads out using both UE       Medium theraputty - taffy pulls 1 3 minutes  Needed repeated instruction for proper completion                        Activities Time    Notes/Comments   MRMT activities - placed with each UE; flipped with both UE at same time; flipping/displacing one hand at a time; putting discs back into case using both UE at same time  x Patient had difficult time flipping discs at same time with both UE; patient had difficulty following directions when the directions changed; did report fatigue in UE.    Nuts and bolts  x Able to complete activity but required slightly increased time to complete   Had patient  pegs one color at a time and nested pegs in palm   Required repeating of instructions   Discussion regarding how to safely plant flowers at home   Recommended for patient to plant ramos in pots versus the ground and to use her walker at all times at home (inside and outside)     Specific Interventions Next Treatment: coordination,  strengthening, pinch strengthening, advanced ADL/safety    Activity/Treatment Tolerance:  [x]  Patient tolerated treatment well  []  Patient limited by fatigue  []  Patient limited by pain   []  Patient limited by other medical complications  []  Other:     Assessment: Patient is progressing toward goals slowly. GOALS:  Patient Goal: To get stronger. To be able to do more in the house. Short Term Goals:  Time Frame: 4 weeks  1. Be independent with HEP as instructed to increase her ability to use can opener. 2. Be able to stand to complete simple UE activity without assistance and no loss of balance. 3. Increase bilateral  strength to at least 30# to increase her ability to open containers. Long Term Goals:  Time Frame: 12 weeks  1. Patient and  will report follow through with home safety recommendations to assist with patient's independence. 2. Improve bilateral hand coordination as evidenced by her ability to complete 9 hole peg test with right hand in  30 seconds and left hand in 37 seconds to increase her ability to complete basic household chores. Patient Education:   []  HEP/Education Completed:      [x]  No new Education completed  []  Reviewed Prior HEP      [x]  Patient verbalized and/or demonstrated understanding of education provided. []  Patient unable to verbalize and/or demonstrate understanding of education provided. Will continue education. [x]  Barriers to learning: decreased memory    PLAN:      []  Plan of care initiated. Plan to see patient 2 times per week for  12 weeks to address the treatment planned outlined above.   [x]  Continue with current plan of care  []  Modify plan of care as follows:    []  Hold pending physician visit  []  Discharge    Time In 1033   Time Out 1100   Timed Code Minutes: 27 min   Total Treatment Time: 27 min       Jonathan Scott OTR/KENDRA #[id number]

## 2022-05-19 NOTE — PROGRESS NOTES
[address] [facility]  PHYSICAL THERAPY  [x] DAILY NOTE (LAND) [] DAILY NOTE (AQUATIC ) [] PROGRESS NOTE [] DISCHARGE NOTE    [] 615 [facility]   [x] [address]    [] [facility]   [] [facility]    Date: 2022  Patient Name:  Arthea Boeck  : 1955  MRN: [id number]  CSN: [id number]    Referring Practitioner Britt Bobo MD   Diagnosis History of falling [Z91.81]  Other amnesia [R41.3]  Cerebral infarction due to thrombosis of unspecified precerebral artery [I63.00]  Other abnormalities of gait and mobility [R26.89]    Treatment Diagnosis LE weakness, decreased balance, fall risk   Date of Evaluation 22   Additional Pertinent History Poor historian-Fall over dog with R shoulder fracture  with ORIF , neck surgery 25 years ago, HTN, anxiety, irritable bowel syndrome, stroke + , memory issues, stage 4 kidney disease      Functional Outcome Measure Used Tinetti balance test   Functional Outcome Score eval  (22)       Insurance: Primary: Payor: Kenny Fitzgerald /  /  / ,   Secondary: [facility]   Authorization Information: Pays at 80%, modalities covered except ionto/MHP/CP not covered   Visit # 4, 4/10 for progress note   Visits Allowed: unlimited   Recertification Date:    Physician Follow-Up:  f/u with Kidney MD, 8/15 f/u with heart MD   Physician Orders:    History of Present Illness: Patient is very poor historian, no family present. Patient showed up for OT eval on  at 3:30 but date scheduled was . Patient reports using a RW when outside house but no device in house. Patient denies any recent falls but when at wellness check with family MD  patient reported having a recent fall, backwards in bedroom and hit head on cedar chest.   Patient reports at beginning of eval that right side was weaker since strokes in  but per notes by MD left side affected more.   Patient reports that she does drive, short distance from [place] to [place], does not drive in [facility] [place] . Patient reports \"I'm supposed to use a RW but can't get RW in/out of car. \"  Patient brought in quad cane \"but I don't use this at all, I'm supposed to use a RW but I don't. \"       SUBJECTIVE: Patient reporting \"normal\" amount of pain today and reporting no other complains. OBJECTIVE:    TREATMENT   Precautions:    Pain: LBP 4/10    X in shaded column indicates activity completed today   Modalities Parameters/  Location  Notes                     Manual Therapy Time/Technique  Notes                     Exercise/Intervention   Notes   NuStep Level 1 4 min  x    B LE exercises ankle pump 15   Cues to count out loud for each exercise   Heel slide 10  x    SAQ  15x5s  x    SLR 10 +5  x    LAQ 15  x    Side lying clam shell 10  x    Seated heel/toe raises  15                    Standing:       Heel/Toes raises  15  x    Marching, mini squats 10  x    Hip abduction  10  x    2inch step ups forward/lateral 10  x    Rocker board 2 directions  15 taps  x Assist getting off board     Specific Interventions Next Treatment:     Activity/Treatment Tolerance:  []  Patient tolerated treatment well  []  Patient limited by fatigue  []  Patient limited by pain   []  Patient limited by medical complications  []  Other:     Assessment: Added mini squats with patient tolerating well. Patient did need help with getting off rocker board but reporting no complains at end of session. GOALS:  Patient Goal: the doctor told me to come    Short Term Goals:  Time Frame: 5 weeks  1. Increase AROM B hip flexion to 40, B ankle DF to 10 degrees, to allow patient to walk in/out of clinic with appropriate assistive device with no difficulty going over threshold/rugs  2. increase strength B hip flexion to 3+/5, L ankle DF to 3+/5 to allow patient to report able to walk x 30 minutes at store with cart without rest break  3.   I with HEP as prescribed to allow patient to improve Tinetti score balance to 18/28 with no falls x 3 weeks      Long Term Goals:  Time Frame: 8 weeks  1. Increase AROM B hip flexion to 50, L knee flexion to 130 degrees to allow patient to report able to walk household distance without device with no LOB  2. Increase strength B LE to 4-/5 to allow patient to report able to walk for exercise for 10 minutes without fall or rest break  3. I with HEP as prescribed to allow patient to improve balance Tinetti score 22/28 with patient to report no falls x 6 weeks  Patient Education:   [x]  HEP/Education Completed: continue with HEP 2x's a day  [address] [facility] Access Code:[email or web address]  []  No new Education completed  []  Reviewed Prior HEP      []  Patient verbalized and/or demonstrated understanding of education provided. []  Patient unable to verbalize and/or demonstrate understanding of education provided. Will continue education. [x]  Barriers to learning: memory issues- needs handouts    PLAN:  Treatment Recommendations: Strengthening, Range of Motion, Balance Training, Endurance Training, Gait Training, Home Exercise Program, Patient Education and Safety Education and Training      Plan to see patient 2 times per week for 8 weeks to address the treatment planned outlined above.   [x]  Continue with current plan of care  []  Modify plan of care as follows:    []  Hold pending physician visit  []  Discharge    Time In 1105   Time Out 1129   Timed Code Minutes: 24 min   Total Treatment Time: 24 min       Electronically Signed by: Ricky Kay PTA

## 2022-05-24 ENCOUNTER — APPOINTMENT (OUTPATIENT)
Dept: PHYSICAL THERAPY | Age: 67
End: 2022-05-24
Payer: MEDICARE

## 2022-05-24 ENCOUNTER — APPOINTMENT (OUTPATIENT)
Dept: SPEECH THERAPY | Age: 67
End: 2022-05-24
Payer: MEDICARE

## 2022-05-26 ENCOUNTER — APPOINTMENT (OUTPATIENT)
Dept: SPEECH THERAPY | Age: 67
End: 2022-05-26
Payer: MEDICARE

## 2022-05-26 ENCOUNTER — HOSPITAL ENCOUNTER (OUTPATIENT)
Dept: PHYSICAL THERAPY | Age: 67
Setting detail: THERAPIES SERIES
Discharge: HOME | End: 2022-05-26
Payer: MEDICARE

## 2022-05-26 ENCOUNTER — HOSPITAL ENCOUNTER (OUTPATIENT)
Dept: OCCUPATIONAL THERAPY | Age: 67
Setting detail: THERAPIES SERIES
Discharge: HOME | End: 2022-05-26
Payer: MEDICARE

## 2022-05-26 ENCOUNTER — HOSPITAL ENCOUNTER (OUTPATIENT)
Dept: SPEECH THERAPY | Age: 67
Setting detail: THERAPIES SERIES
Discharge: HOME | End: 2022-05-26
Payer: MEDICARE

## 2022-05-26 PROCEDURE — 97110 THERAPEUTIC EXERCISES: CPT

## 2022-05-26 PROCEDURE — 92507 TX SP LANG VOICE COMM INDIV: CPT

## 2022-05-26 NOTE — PROGRESS NOTES
[address] [facility]  PHYSICAL THERAPY  [x] DAILY NOTE (LAND) [] DAILY NOTE (AQUATIC ) [] PROGRESS NOTE [] DISCHARGE NOTE    [] [address] [facility]   [x] [address]    [] [facility]   [] Dayana Jarquin    Date: 2022  Patient Name:  Kerri Tolentino  : 1955  MRN: [id number]  CSN: [id number]    Referring Practitioner Yadira Candelario MD   Diagnosis History of falling [Z91.81]  Other amnesia [R41.3]  Cerebral infarction due to thrombosis of unspecified precerebral artery [I63.00]  Other abnormalities of gait and mobility [R26.89]    Treatment Diagnosis LE weakness, decreased balance, fall risk   Date of Evaluation 22   Additional Pertinent History Poor historian-Fall over dog with R shoulder fracture  with ORIF , neck surgery 25 years ago, HTN, anxiety, irritable bowel syndrome, stroke + , memory issues, stage 4 kidney disease      Functional Outcome Measure Used Tinetti balance test   Functional Outcome Score eval  (22)       Insurance: Primary: Payor: Holger Archer /  /  / ,   Secondary: [employer]   Authorization Information: Pays at 80%, modalities covered except ionto/MHP/CP not covered   Visit # 5, 5/10 for progress note   Visits Allowed: unlimited   Recertification Date:    Physician Follow-Up:  f/u with Kidney MD, 8/15 f/u with heart MD   Physician Orders:    History of Present Illness: Patient is very poor historian, no family present. Patient showed up for OT eval on  at 3:30 but date scheduled was . Patient reports using a RW when outside house but no device in house. Patient denies any recent falls but when at wellness check with family MD  patient reported having a recent fall, backwards in bedroom and hit head on cedar chest.   Patient reports at beginning of eval that right side was weaker since strokes in  but per notes by MD left side affected more.   Patient reports that she does drive, short distance from [place] to [place], does not drive in [facility] . Patient reports \"I'm supposed to use a RW but can't get RW in/out of car. \"  Patient brought in quad cane \"but I don't use this at all, I'm supposed to use a RW but I don't. \"       SUBJECTIVE: Patient reporting having more low back and stomach pain today following colonoscopy. Admits to not doing HEP at all this week. OBJECTIVE:    TREATMENT   Precautions:    Pain: LBP 6-7/10    X in shaded column indicates activity completed today   Modalities Parameters/  Location  Notes                     Manual Therapy Time/Technique  Notes                     Exercise/Intervention   Notes   NuStep Level 1 5 min  x    B LE exercises ankle pump 15   Cues to count out loud for each exercise   Heel slide 2x10  x    SAQ  15x5s  x    SLR 10 +5  x    LAQ 15  x    Side lying clam shell 10  x    Seated heel/toe raises  15  x                  Standing:       Heel/Toes raises  15  x    Marching, mini squats 10  x    Hip abduction  10  x    2inch step ups forward/lateral 10  x    Rocker board 2 directions  15 taps   Assist getting off board     Specific Interventions Next Treatment:     Activity/Treatment Tolerance:  []  Patient tolerated treatment well  []  Patient limited by fatigue  []  Patient limited by pain   []  Patient limited by medical complications  []  Other:     Assessment: Continued with program as noted and added few reps with patient having fair tolerance. Patient still reporting LBP at end of session. GOALS:  Patient Goal: the doctor told me to come    Short Term Goals:  Time Frame: 5 weeks  1. Increase AROM B hip flexion to 40, B ankle DF to 10 degrees, to allow patient to walk in/out of clinic with appropriate assistive device with no difficulty going over threshold/rugs  2. increase strength B hip flexion to 3+/5, L ankle DF to 3+/5 to allow patient to report able to walk x 30 minutes at store with cart without rest break  3.   I with HEP as prescribed to allow patient to improve Tinetti score balance to 18/28 with no falls x 3 weeks      Long Term Goals:  Time Frame: 8 weeks  1. Increase AROM B hip flexion to 50, L knee flexion to 130 degrees to allow patient to report able to walk household distance without device with no LOB  2. Increase strength B LE to 4-/5 to allow patient to report able to walk for exercise for 10 minutes without fall or rest break  3. I with HEP as prescribed to allow patient to improve balance Tinetti score 22/28 with patient to report no falls x 6 weeks  Patient Education:   [x]  HEP/Education Completed: continue with HEP 2x's a day  10 [facility] Access Code:[email or web address]  []  No new Education completed  []  Reviewed Prior HEP      []  Patient verbalized and/or demonstrated understanding of education provided. []  Patient unable to verbalize and/or demonstrate understanding of education provided. Will continue education. [x]  Barriers to learning: memory issues- needs handouts    PLAN:  Treatment Recommendations: Strengthening, Range of Motion, Balance Training, Endurance Training, Gait Training, Home Exercise Program, Patient Education and Safety Education and Training      Plan to see patient 2 times per week for 8 weeks to address the treatment planned outlined above.   [x]  Continue with current plan of care  []  Modify plan of care as follows:    []  Hold pending physician visit  []  Discharge    Time In 1106   Time Out 1130   Timed Code Minutes: 24 min   Total Treatment Time: 24 min       Electronically Signed by: Xiang Berman PTA

## 2022-05-26 NOTE — PROGRESS NOTES
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 AQUATIC THERAPY COVERED:   Yes  MODALITIES COVERED:  Yes, with the exception of iontophoresis and hot packs/cold packs  TELEHEALTH COVERED: Yes   Visit # 5, 10 for progress note   Visits Allowed: Unlimited   Recertification Date:    Physician Follow-Up: Follows up with Dr. Joni Roque in 2022   Physician Orders: Evaluate and treat   Pertinent History: Patient had CVA 5 or 10 years ago. Patient states that she was in [place] and was in the hospital for a few days and then had outpatient therapy at that time. Patient states that she was told to use an assistive device, but she hasn't used it consistently. States that she fell about a month ago and hit her head. States that she didn't seek medical attention at that time. Comorbidities include HTN, anxiety, memory issues, fibromyalgia, stroke, and depression that could influence rehab process.      SUBJECTIVE: Patient states that she had a colonoscopy this week and will be getting results in the next week or so. OBJECTIVE:      TREATMENT   Precautions: HTN, anxiety, depression, anxiety   Pain:  4/10 LOCATION: left upper arm    X in shaded column indicates Activity Completed Today   Modalities Parameters/  Location  Notes/Comments                     Manual Therapy Time/  Technique  Notes/Comments                     Exercises   Sets/  Sec Reps  Notes/Comments   ergogripper with 2 blue and 1 green band 1 20 with each UE x    Reaching endurance activity for both UE strength and bilateral pinch/ strength 1   Put on vertical post with right UE and removed with left UE   Medium theraputty - digging beads out using both UE       Medium theraputty - taffy pulls 1 3 minutes  Needed repeated instruction for proper completion                        Activities Time    Notes/Comments   MRMT activities - placed with each UE; flipped with both UE at same time; flipping/displacing one hand at a time; putting discs back into case using both UE at same time   Patient had difficult time flipping discs at same time with both UE; patient had difficulty following directions when the directions changed; did report fatigue in UE. Nuts and bolts  x    Tie/untie knots in small rope  x    Had patient  pegs one color at a time and nested pegs in palm   Required repeating of instructions   Discussion regarding how to safely plant flowers at home   Recommended for patient to plant ramos in pots versus the ground and to use her walker at all times at home (inside and outside)     Specific Interventions Next Treatment: coordination,  strengthening, pinch strengthening, advanced ADL/safety    Activity/Treatment Tolerance:  [x]  Patient tolerated treatment well  []  Patient limited by fatigue  []  Patient limited by pain   []  Patient limited by other medical complications  []  Other:     Assessment: Patient progressing slowly toward goals.  Patient continues to be tangential during therapy sessions. Patient and  were given information regarding coordination activities at home. Also discussed need to have conversation with physician regarding her ongoing bowel movement issues. GOALS:  Patient Goal: To get stronger. To be able to do more in the house. Short Term Goals:  Time Frame: 4 weeks  1. Be independent with HEP as instructed to increase her ability to use can opener. 2. Be able to stand to complete simple UE activity without assistance and no loss of balance. 3. Increase bilateral  strength to at least 30# to increase her ability to open containers. Long Term Goals:  Time Frame: 12 weeks  1. Patient and  will report follow through with home safety recommendations to assist with patient's independence. 2. Improve bilateral hand coordination as evidenced by her ability to complete 9 hole peg test with right hand in  30 seconds and left hand in 37 seconds to increase her ability to complete basic household chores. Patient Education:   [x]  HEP/Education Completed: coordination activities     []  No new Education completed  []  Reviewed Prior HEP      [x]  Patient verbalized and/or demonstrated understanding of education provided. []  Patient unable to verbalize and/or demonstrate understanding of education provided. Will continue education. [x]  Barriers to learning: decreased memory    PLAN:      []  Plan of care initiated. Plan to see patient 2 times per week for  12 weeks to address the treatment planned outlined above.   [x]  Continue with current plan of care  []  Modify plan of care as follows:    []  Hold pending physician visit  []  Discharge    Time In 1130   Time Out 1200   Timed Code Minutes: 30 min   Total Treatment Time: 30 min       Grayson Santoyo OTR/L #[id number]

## 2022-05-26 NOTE — PROGRESS NOTES
[facility]  [] SPEECH LANGUAGE COGNITIVE EVALUATION  [x] DAILY NOTE   [] PROGRESS NOTE [] DISCHARGE NOTE    [] [facility]   [x] [address]    [] 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 Visit # 3, 3/10 for progress note   Visits Allowed: Unlimited   Recertification Date: 30   Physician Follow-Up: 10/12/22 - Dr. Vic Heaton   Physician Orders: Evaluate and treat    Pertinent History: Patient has history of \"two strokes that occurred 13 years ago\". Per chart review, CVAs occurred in . Poor historian. Endorses presence of cognitive deficits since. Patient recently saw her PCP and she gave her three words to remember and after a few minutes she could only recall one of the three words. Patient also endorses trouble with her memory within her home environment. Reports she intermittently \"gets mixed up and says the wrong thing\". She states other people tell her she said something incorrectly and then she recognizes and is able to state \"I know what I meant but I said the wrong thing\". Endorses occasional problems using her tablet which her daughter then fixes. ST OP evaluation of cognitive-linguistic abilities this date.       SUBJECTIVE: Patient arrived alone for session, stating her  was waiting in car because he wasn't feeling well. Patient reporting some abdominal discomfort from colonoscopy completed earlier this week and also has allergies flaring up currently. SHORT TERM GOAL #1:  Goal 1: The patient will demonstrate understanding and use of 3+ trained word finding strategies given minimal cues to promote ability to independently utilize during moments of impaired lexical retrieval within conversation. INTERVENTIONS: Reviewed word finding strategies instructed in previous session. Patient recalls the following: find the first letter, think of something similar to target word, and ask for help. SLP and patient reviewed additional strategies including the following:  - describe it   - delay/wait time  - narrow it down (provide category)  - use gestures  - draw it  - look it up     After SLP explaining the above strategies, patient with recognition of all. She stated they all sounded \"familiar\" but had difficulty recalling them independently. SHORT TERM GOAL #2:  Goal 2: The patient will independently demonstrate understanding of and will implement 3+ memory/cognitive strategies/external aids within the home environment to promote improved recall and management of daily schedule. INTERVENTIONS: Reviewed recommendations for implementing home strategies/aids to improve success and recall at home. Patient reports having a notebook next to her chair where she writes information down, especially after phone calls. She also describes recently moving her calendar to another location where she sees it and can check it more frequently. Discussed remembering to take medications at same time daily, and patient reports not having eaten breakfast and therefore not having taken morning medications today. Patient with independent recall of need to set \"timer\" for this. SLP instructed in setting alarm for same two times daily.  Encouraged #5:  Goal 5: The patient will complete selective, alternating, and divided attention exercises with no more than 2 errors per task in 2/3 trials to promote improved ability to attend to and complete functional activities in the home and community environments. INTERVENTIONS: Attention exercises to improve ability to attend to and complete activities in the home. Selective attention exercise:   30 second task of auditory ID of target \"7\" in rapid verbalized digit span: 9/9 targets identified independently     Divided attention exercise:   45 second task of auditory ID of \"7\" and \"A\" in rapid, random digit and letter span: 2/4 targets of \"7\" identified independently and 1/4 targets of \"A\" identified independently      Long-term Goals  Timeframe for Long-term Goals: 6 weeks  Goal 1: The patient will improve total score on MOCA re-administration by 4+ points to reflect improvement in cognitive-linguistic functioning and/or ability to implement trained strategies for increased independence within the home environment. Assessment: Progressing toward goals. Receptive to learning and education on strategies provided and willing to attempt their implementation. Specific Interventions Next Treatment: education and training for word finding strategies, education and training on internal/external memory and cognitive strategies, executive function, planning, and sequencing exercises, memory exercises, attention exercises    Activity/Treatment Tolerance:  [x]  Patient tolerated treatment well  []  Patient limited by fatigue  []  Patient limited by pain   []  Patient limited by other medical complications  []  Other:     Patient Education:   [x]  HEP/Education Completed: Plan of Care, Goals, instruction to implement discussed cognitive/memory strategies  []  No new Education completed  []  Reviewed Prior HEP      [x]  Patient verbalized and/or demonstrated understanding of education provided.   []  Patient unable to verbalize and/or demonstrate understanding of education provided. Will continue education. [x]  Barriers to learning: baseline cognitive impairments from previous stroke    PLAN:  []  Plan of care initiated. Plan to see patient 1 time per week for 6 weeks to address the treatment planned outlined above.   [x]  Continue with current plan of care  []  Modify plan of care as follows:    []  Hold pending physician visit  []  Discharge    Time In 1030   Time Out 1100   Timed Code Minutes: 0 min   Total Treatment Time: 30 min       Gareth Randall M.S., [address] [place] [address]

## 2022-05-27 ENCOUNTER — TELEPHONE (OUTPATIENT)
Dept: FAMILY MEDICINE CLINIC | Age: 67
End: 2022-05-27

## 2022-05-27 ENCOUNTER — OFFICE VISIT (OUTPATIENT)
Dept: FAMILY MEDICINE CLINIC | Age: 67
End: 2022-05-27
Payer: MEDICARE

## 2022-05-27 VITALS
WEIGHT: 117 LBS | HEIGHT: 60 IN | SYSTOLIC BLOOD PRESSURE: 136 MMHG | BODY MASS INDEX: 22.97 KG/M2 | HEART RATE: 88 BPM | OXYGEN SATURATION: 98 % | DIASTOLIC BLOOD PRESSURE: 80 MMHG | TEMPERATURE: 97.8 F

## 2022-05-27 DIAGNOSIS — K59.00 CONSTIPATION, UNSPECIFIED CONSTIPATION TYPE: ICD-10-CM

## 2022-05-27 DIAGNOSIS — J30.1 ALLERGIC RHINITIS DUE TO POLLEN, UNSPECIFIED SEASONALITY: Primary | ICD-10-CM

## 2022-05-27 PROCEDURE — G8399 PT W/DXA RESULTS DOCUMENT: HCPCS | Performed by: FAMILY MEDICINE

## 2022-05-27 PROCEDURE — 1090F PRES/ABSN URINE INCON ASSESS: CPT | Performed by: FAMILY MEDICINE

## 2022-05-27 PROCEDURE — 99214 OFFICE O/P EST MOD 30 MIN: CPT | Performed by: FAMILY MEDICINE

## 2022-05-27 PROCEDURE — G8427 DOCREV CUR MEDS BY ELIG CLIN: HCPCS | Performed by: FAMILY MEDICINE

## 2022-05-27 PROCEDURE — 1123F ACP DISCUSS/DSCN MKR DOCD: CPT | Performed by: FAMILY MEDICINE

## 2022-05-27 PROCEDURE — 3017F COLORECTAL CA SCREEN DOC REV: CPT | Performed by: FAMILY MEDICINE

## 2022-05-27 PROCEDURE — 1036F TOBACCO NON-USER: CPT | Performed by: FAMILY MEDICINE

## 2022-05-27 PROCEDURE — G8420 CALC BMI NORM PARAMETERS: HCPCS | Performed by: FAMILY MEDICINE

## 2022-05-27 RX ORDER — MONTELUKAST SODIUM 10 MG/1
10 TABLET ORAL DAILY
Qty: 30 TABLET | Refills: 5 | Status: SHIPPED | OUTPATIENT
Start: 2022-05-27

## 2022-05-27 ASSESSMENT — ENCOUNTER SYMPTOMS
SINUS PRESSURE: 1
EYE DISCHARGE: 1
VOMITING: 1
EYE REDNESS: 1
ABDOMINAL PAIN: 1
RHINORRHEA: 1
NAUSEA: 1
SINUS PAIN: 1
EYE ITCHING: 1

## 2022-05-27 NOTE — TELEPHONE ENCOUNTER
----- Message from Lashell Gilmore MD sent at 5/27/2022  4:44 PM EDT -----  I had wanted patient to return in a couple of months from this visit but I marked 4 months erroneously. Please cancel the September visit and see if we can do sooner. Sorry about that.  Thank you

## 2022-05-27 NOTE — Clinical Note
I had wanted patient to return in a couple of months from this visit but I marked 4 months erroneously. Please cancel the September visit and see if we can do sooner. Sorry about that.  Thank you

## 2022-05-27 NOTE — PROGRESS NOTES
Attending Physician Statement  I have discussed the case, including pertinent history and exam findings with the resident. I also have seen the patient and performed key portions of the examination. I agree with the documented assessment and plan as documented by the resident. Porter Portillo (:  1955) is a 74 y.o. female,Established patient, here for evaluation of the following chief complaint(s): Allergies (hoping to get injection for her allergies)         ASSESSMENT/PLAN:  1. Allergic rhinitis due to pollen, unspecified seasonality  -     montelukast (SINGULAIR) 10 MG tablet; Take 1 tablet by mouth daily, Disp-30 tablet, R-5Normal  2. Constipation, unspecified constipation type    We will add Singulair for the next couple of months. If not improved, consider changing the antihistamine and may be trying an antihistamine-steroid nasal spray. Consider as well we looking at her environment and seeing what could be improved for insight allergies. She may try her power pudding and also and prove a probiotic intake with flaxseed meal and some fruit for improved gut function. Return in about 2 months (around 2022). Subjective   SUBJECTIVE/OBJECTIVE:  HPI   Allergies have been flaring up. In the last couple of years she has had struggle enjoying outside activities because of this. She uses triamiconolone + claritin, Nasal sinus rinse. Phlegm, congestion, tearing of the eyes and drippy nose are part of her symptoms. She may have a little cough. She does not really wish to have the allergy testing with twice a week injections. She knows that she may not qualify if she would do this again as she did not qualify the first time. She has heard of injections done at the office. I clarified that this is a steroid injection that only helped for 2 to 3 weeks. She has needs for longer in the season. Constipation. Frustrated because medications given to her by  is not helping.   She continues to work with GI. They have ruled out any significant issues. She is wondering about diet modifications. She is on  probiotic, [employer] pride and it seems to be somewhat helpful. She was given a recipe for a power pudding that includes bran and applesauce. She is wondering about other food things that she could do to help her self. She feels that she is doing a good amount of fiber. She also feels that she is doing good amount of water. Review of Systems   none    Objective   Physical Exam    Gen: NAD, AAO x 3, coherent, pleasant    An electronic signature was used to authenticate this note.     --Brenda Peace MD

## 2022-05-27 NOTE — PROGRESS NOTES
Ammy Galo is a 74 y.o. female who presents today for:  Chief Complaint   Patient presents with    Allergies     hoping to get injection for her allergies         HPI:   Ammy Galo is 74 y.o. who presents today to discuss allergy shots. Had previously had allergen testing a few years ago, tested for a few allergens, including cats. Unable to go outside last year due to allergy symptoms, has been having recurrence of allergy symptoms this spring as well. Has been using neti pot and some form of a nasal spray with minimal relief. Takes zyrtec daily. Allergy symptoms including postnasal drip, congestion, rhinorrhea, sneezing. Had allergy shots done around 40 years ago, did notice improvement of allergies at that time.        Objective:     Vitals:    05/27/22 1430   BP: 136/80   Site: Left Upper Arm   Position: Sitting   Cuff Size: Medium Adult   Pulse: 88   Temp: 97.8 °F (36.6 °C)   SpO2: 98%   Weight: 117 lb (53.1 kg)   Height: 5' (1.524 m)       Wt Readings from Last 3 Encounters:   05/27/22 117 lb (53.1 kg)   05/12/22 117 lb (53.1 kg)   04/12/22 122 lb (55.3 kg)       BP Readings from Last 3 Encounters:   05/27/22 136/80   05/12/22 139/75   04/12/22 (!) 150/80       Lab Results   Component Value Date    WBC 6.4 02/14/2022    HGB 10.8 (L) 02/14/2022    HCT 35.3 (L) 02/14/2022    .3 (H) 02/14/2022     02/14/2022     Lab Results   Component Value Date     05/06/2022    K 4.1 05/06/2022     05/06/2022    CO2 27 05/06/2022    BUN 22 05/06/2022    CREATININE 1.8 (H) 05/06/2022    GLUCOSE 93 05/06/2022    CALCIUM 8.8 05/06/2022    PROT 6.8 02/14/2022    LABALBU 4.5 02/14/2022    BILITOT 0.2 (L) 02/14/2022    ALKPHOS 96 02/14/2022    AST 20 02/14/2022    ALT 15 02/14/2022    LABGLOM 28 (A) 05/06/2022     Lab Results   Component Value Date    TSH 2.780 01/21/2021    T4FREE 1.43 01/21/2021     Lab Results   Component Value Date    LABA1C 5.6 10/20/2020     No results found for: EAG  Lab Results   Component Value Date    CHOL 174 01/21/2021    CHOL 187 01/15/2019    CHOL 189 01/16/2018     Lab Results   Component Value Date    TRIG 59 01/21/2021    TRIG 62 01/15/2019    TRIG 50 01/16/2018     Lab Results   Component Value Date     01/21/2021     01/15/2019     01/16/2018     Lab Results   Component Value Date    LDLCALC 58 01/21/2021    LDLCALC 63 01/15/2019    LDLCALC 43 01/16/2018       Lab Results   Component Value Date    LABMICR < 1.20 07/06/2020       Review of Systems   Constitutional: Positive for fatigue. Negative for chills and fever. HENT: Positive for congestion, postnasal drip, rhinorrhea, sinus pressure, sinus pain and sneezing. Eyes: Positive for discharge, redness and itching. Gastrointestinal: Positive for abdominal pain, nausea and vomiting. Skin: Negative for rash. Neurological: Positive for headaches. Physical Exam  Constitutional:       Appearance: Normal appearance. HENT:      Head: Normocephalic and atraumatic. Right Ear: External ear normal.      Left Ear: External ear normal.      Mouth/Throat:      Mouth: Mucous membranes are moist.   Eyes:      Extraocular Movements: Extraocular movements intact. Conjunctiva/sclera: Conjunctivae normal.      Pupils: Pupils are equal, round, and reactive to light. Cardiovascular:      Rate and Rhythm: Normal rate and regular rhythm. Heart sounds: Normal heart sounds. No murmur heard. No gallop. Pulmonary:      Effort: Pulmonary effort is normal. No respiratory distress. Breath sounds: Normal breath sounds. No wheezing. Abdominal:      General: Abdomen is flat. There is no distension. Palpations: Abdomen is soft. Tenderness: There is no abdominal tenderness. There is no guarding. Musculoskeletal:         General: Normal range of motion. Skin:     General: Skin is warm. Neurological:      Mental Status: She is alert.          Immunization History Administered Date(s) Administered    COVID-19, Prudenciomax Monroy, Primary or Immunocompromised, PF, 100mcg/0.5mL 02/23/2021, 03/23/2021, 11/30/2021    Influenza, Quadv, 6 mo and older, IM (Fluzone, Flulaval) 10/02/2018    Influenza, Quadv, IM, (6 mo and older Fluzone, Flulaval, Fluarix and 3 yrs and older Afluria) 12/28/2016, 11/17/2017    Influenza, Quadv, adjuvanted, 65 yrs +, IM, PF (Fluad) 10/20/2020, 10/04/2021    Influenza, Triv, inactivated, subunit, adjuvanted, IM (Fluad 65 yrs and older) 11/20/2019    Pneumococcal Conjugate 13-valent ([id number]) 01/15/2019    Tdap (Boostrix, Adacel) 08/12/2018       Health Maintenance Due   Topic Date Due    Shingles vaccine (1 of 2) Never done    Pneumococcal 65+ years Vaccine (2 - PPSV23 or PCV20) 01/15/2020    Lipids  01/21/2022          Food Insecurity: No Food Insecurity    Worried About Running Out of Food in the Last Year: Never true    [place] of Food in the Last Year: Never true       Assessment / Plan:      Diagnosis Orders   1. Allergic rhinitis due to pollen, unspecified seasonality  montelukast (SINGULAIR) 10 MG tablet   2. Constipation, unspecified constipation type         - Will begin singulair for allergic symptoms at this time, patient would prefer to hold off repeat allergen work up and shots   - Continue with GI recommendations for constipation        Return in about 4 months (around 9/27/2022).           Medications Prescribed:  Orders Placed This Encounter   Medications    montelukast (SINGULAIR) 10 MG tablet     Sig: Take 1 tablet by mouth daily     Dispense:  30 tablet     Refill:  5       Future Appointments   Date Time Provider Jaya Chapman   5/31/2022 10:00 AM Courtney Miranda[address] [facility]   5/31/2022 10:30 AM JACKELIN Pop DEL [facility] [place] [facility]   5/31/2022 36:96 AM Tanner Rodriguez, PT STRPATSY DEL PT [place] [facility]   6/7/2022 10:30 AM JACKELIN Pop DEL [address] [place] [address] [address] [facility]   6/7/2022 44:46 AM Tanner Rodriguez, PT [place] 6/7/2022 11:45 AM Celestino Lawson, MONET STRZ DEL OT [facility]   6/30/2022 12:00 PM Nancy Paul, PhD N [id number] [facility] [place] [address]   7/21/2022 12:00 PM Nancy Paul, PhD N [id number] [facility] [place] [address]   8/15/2022  2:00 PM Nanyc Paul, PhD N [id number] [facility] [place]   8/15/2022  3:15 PM Lennie Calvo MD N SRPX Heart [facility] [place] [address]   10/12/2022 10:40 AM MD NAYANA AriasX DELPHOS [facility] [place]   10/28/2022 12:00 PM MD HARLEEN Storey SRPX Heart [facility] [place] [address] [address]   11/17/2022 10:00 AM Christiana Snowden MD N [address] [address]       Patient given educational materials - see patient instructions. Discussed use, benefit, and sideeffects of prescribed medications. All patient questions answered. Pt voiced understanding. Reviewed health maintenance. Instructed to continue current medications, diet and exercise. Patient agreed with treatment plan. Follow up as directed.      Electronically signed by Rosmery Light DO on 5/27/2022 at 3:36 PM

## 2022-05-31 ENCOUNTER — HOSPITAL ENCOUNTER (OUTPATIENT)
Dept: SPEECH THERAPY | Age: 67
Setting detail: THERAPIES SERIES
Discharge: HOME | End: 2022-05-31
Payer: MEDICARE

## 2022-05-31 ENCOUNTER — HOSPITAL ENCOUNTER (OUTPATIENT)
Dept: OCCUPATIONAL THERAPY | Age: 67
Setting detail: THERAPIES SERIES
Discharge: HOME | End: 2022-05-31
Payer: MEDICARE

## 2022-05-31 ENCOUNTER — HOSPITAL ENCOUNTER (OUTPATIENT)
Dept: PHYSICAL THERAPY | Age: 67
Setting detail: THERAPIES SERIES
Discharge: HOME | End: 2022-05-31
Payer: MEDICARE

## 2022-05-31 PROCEDURE — 97110 THERAPEUTIC EXERCISES: CPT

## 2022-05-31 PROCEDURE — 97530 THERAPEUTIC ACTIVITIES: CPT

## 2022-05-31 PROCEDURE — 92507 TX SP LANG VOICE COMM INDIV: CPT

## 2022-05-31 NOTE — PROGRESS NOTES
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 Visit # 4, 4/10 for progress note   Visits Allowed: Unlimited   Recertification Date: 3/9/27   Physician Follow-Up: 10/12/22 - Dr. Gloria Diamond   Physician Orders: Evaluate and treat    Pertinent History: Patient has history of \"two strokes that occurred 13 years ago\". Per chart review, CVAs occurred in . Poor historian. Endorses presence of cognitive deficits since. Patient recently saw her PCP and she gave her three words to remember and after a few minutes she could only recall one of the three words. Patient also endorses trouble with her memory within her home environment. Reports she intermittently \"gets mixed up and says the wrong thing\". She states other people tell her she said something incorrectly and then she recognizes and is able to state \"I know what I meant but I said the wrong thing\". Endorses occasional problems using her tablet which her daughter then fixes. ST OP evaluation of cognitive-linguistic abilities this date.       SUBJECTIVE: Patient arrived with  Pancho Chu for appointment. Reports no major changes since previous visit. Denies pain impacting ability to participate in today's session. Initiated discharge planning with patient and  agreeable to next visit as discharge from SLP services. SHORT TERM GOAL #1:  Goal 1: The patient will demonstrate understanding and use of 3+ trained word finding strategies given minimal cues to promote ability to independently utilize during moments of impaired lexical retrieval within conversation. INTERVENTIONS: Reviewed previously trained word finding strategies. Patient independently recalls the following: find the first letter, relate it to something else/something similar, and draw a picture. SLP and patient reviewed additional strategies including the following:  - describe it   - delay/wait time  - narrow it down (provide category)  - use gestures  - look it up   - come back later     Patient independently using describe it, narrow it down, and gestures to name a specific flower in conversation with SLP and ; Patient able to successfully name \"hibiscus\". SHORT TERM GOAL #2:  Goal 2: The patient will independently demonstrate understanding of and will implement 3+ memory/cognitive strategies/external aids within the home environment to promote improved recall and management of daily schedule. INTERVENTIONS: Discussed strategies implemented at home. Patient reports she continues to use notebook by her chair to write notes and use calendar for upcoming appointments. Patient has not yet set alarms for daily medications. Encouraged patient and  to complete after therapy visits today. Patient stated independently that appropriate times for alarms are 930am and 800pm. SLP explained to set alarms for every day of the week and to title the alarm as \"Take morning/night medications\" respectively, to alert patient exactly as to what the alarm is for.  Discussed use of snooze function vs dismiss function, and SLP provided situation and tasked patient with verbalizing which function she should use. Independently made appropriate choice. Also explained situation in which dismiss function could be used (e.g., after already haven taken medications as part of morning routine). Patient also with independent insight regarding need to set timer when cooking. Discussed ability to use sticky note for reminder to set timer when cooking if needed. SHORT TERM GOAL #3:  Goal 3: The patient will complete immediate and short-term recall exercises of 4+ units of information with 80% accuracy given moderate cues, with or without use of trained memory strategies, to promote improved retention of newly learned information. INTERVENTIONS:  Did not address this session due to focus on other goals. SHORT TERM GOAL #4:  Goal 4: The patient will complete executive functioning, planning, and sequencing exercises with 70% accuracy given moderate cues to improve ability to independently manage daily schedule of events. INTERVENTIONS: Utilized patient's familiar meals, spaghetti and grilled cheese, to complete planning and verbal sequencing tasks. Spaghetti task:  Ingredient list: Spaghetti, hamburger, Ragu, cheese listed independently  Verbal sequencing: required mod verbal cues for organization, order, and all details and supplies  Discussed ability to use timer or two timers dependent upon using both oven and stove as external strategies/aids    Grilled Cheese task:  Ingredients list: bread, butter, cheese listed independently   Supplies list: spatula, pan listed independently  Verbal sequencing: required min verbal cues for inclusion of details and organization     Discussed use of [employer] or internet for obtaining information related to cooking times for various items. Denies deficits with following recipe or written instructions for new items cooked/baked. SHORT TERM GOAL #5:  Goal 5:  The patient will complete selective, alternating, and divided attention exercises with no more than 2 errors per task in 2/3 trials to promote improved ability to attend to and complete functional activities in the home and community environments. INTERVENTIONS: Verbally discussed attention abilities and limitations. Educated patient on limiting tasks completed simultaneously. Scenario provided to patient which required reflection on ability to add additional task (e.g., answering phone) when already completing multiple (e.g., cooking several items). Patient with good insight, stating she would not answer phone when busy with other tasks. SLP explained strategy of asking for help from  with one of the tasks when appropriate. Explained off-loading tasks and also preparing items in advance as appropriate (e.g., prepping salad and refrigerating prior to initiating cooking of spaghetti). Patient independently mentioned prepping items ahead of time as able. Fair-good insight of abilities. Long-term Goals  Timeframe for Long-term Goals: 6 weeks  Goal 1: The patient will improve total score on MOCA re-administration by 4+ points to reflect improvement in cognitive-linguistic functioning and/or ability to implement trained strategies for increased independence within the home environment. Assessment: Progressing toward goals. Receptive to learning and education on strategies provided and willing to attempt their implementation. Agreeable to discharge in upcoming session.   Specific Interventions Next Treatment: develop and provide home exercise plan for cognition and use of external aids/strategies    Activity/Treatment Tolerance:  [x]  Patient tolerated treatment well  []  Patient limited by fatigue  []  Patient limited by pain   []  Patient limited by other medical complications  []  Other:     Patient Education:   [x]  HEP/Education Completed: Plan of Care, Goals, instruction to implement discussed cognitive/memory strategies, discharge planning  []  No new Education completed  []  Reviewed Prior HEP      [x]  Patient verbalized and/or demonstrated understanding of education provided. []  Patient unable to verbalize and/or demonstrate understanding of education provided. Will continue education. [x]  Barriers to learning: baseline cognitive impairments from previous stroke    PLAN:  []  Plan of care initiated. Plan to see patient 1 time per week for 6 weeks to address the treatment planned outlined above.   [x]  Continue with current plan of care  []  Modify plan of care as follows:    []  Hold pending physician visit  []  Discharge    Time In 1030   Time Out 1100   Timed Code Minutes: 0 min   Total Treatment Time: 30 min       Dory Perez M.S., [address] [place] [address]

## 2022-05-31 NOTE — PROGRESS NOTES
[address] [facility]  PHYSICAL THERAPY  [x] DAILY NOTE (LAND) [] DAILY NOTE (AQUATIC ) [] PROGRESS NOTE [] DISCHARGE NOTE    [] [address] [facility]   [x] [address]    [] [facility]   [] Maritza Juarez    Date: 2022  Patient Name:  Ana Sabillon  : 1955  MRN: [id number]  CSN: [id number]    Referring Practitioner Enma Tarango MD   Diagnosis History of falling [Z91.81]  Other amnesia [R41.3]  Cerebral infarction due to thrombosis of unspecified precerebral artery [I63.00]  Other abnormalities of gait and mobility [R26.89]    Treatment Diagnosis LE weakness, decreased balance, fall risk   Date of Evaluation 22   Additional Pertinent History Poor historian-Fall over dog with R shoulder fracture  with ORIF , neck surgery 25 years ago, HTN, anxiety, irritable bowel syndrome, stroke + , memory issues, stage 4 kidney disease      Functional Outcome Measure Used Tinetti balance test   Functional Outcome Score eval  (22)       Insurance: Primary: Payor: Pam Villarreal /  /  / ,   Secondary: [employer]   Authorization Information: Pays at 80%, modalities covered except ionto/MHP/CP not covered   Visit # 6, 6/10 for progress note   Visits Allowed: unlimited   Recertification Date: 08   Physician Follow-Up:  f/u with Kidney MD, 8/15 f/u with heart MD   Physician Orders:    History of Present Illness: Patient is very poor historian, no family present. Patient showed up for OT eval on  at 3:30 but date scheduled was . Patient reports using a RW when outside house but no device in house. Patient denies any recent falls but when at wellness check with family MD  patient reported having a recent fall, backwards in bedroom and hit head on cedar chest.   Patient reports at beginning of eval that right side was weaker since strokes in  but per notes by MD left side affected more.   Patient reports that she does drive, short distance from [place] to [place], does not drive in [facility] [place] . Patient reports \"I'm supposed to use a RW but can't get RW in/out of car. \"  Patient brought in quad cane \"but I don't use this at all, I'm supposed to use a RW but I don't. \"       SUBJECTIVE: patient and  reports that they would like to finish PT next week. PT agreeable to this plan and made sure to update HEP    OBJECTIVE:    TREATMENT   Precautions:    Pain: LBP 6/10    X in shaded column indicates activity completed today   Modalities Parameters/  Location  Notes                     Manual Therapy Time/Technique  Notes                     Exercise/Intervention   Notes   NuStep Level 1 5 min  x    B LE exercises ankle pump 15   Cues to count out loud for each exercise   Heel slide 2x10  x    SAQ  15x5s  x    SLR 10 +5  x    LAQ 15  x    Side lying clam shell 10  x    Seated heel/toe raises  15  x                  Standing:       Heel/Toes raises  15  x    Marching, mini squats 10  x    Hip abduction  10  x    2inch step ups forward/lateral 10  x    Rocker board 2 directions  15 taps   Assist getting off board     Specific Interventions Next Treatment:     Activity/Treatment Tolerance:  []  Patient tolerated treatment well  []  Patient limited by fatigue  []  Patient limited by pain   []  Patient limited by medical complications  []  Other:     Assessment: PT discussing POC with patient and . Would like to finish with PT next week per POC, handout for HEP given today and will discharge at next visit  GOALS:  Patient Goal: the doctor told me to come    Short Term Goals:  Time Frame: 5 weeks  1. Increase AROM B hip flexion to 40, B ankle DF to 10 degrees, to allow patient to walk in/out of clinic with appropriate assistive device with no difficulty going over threshold/rugs  2. increase strength B hip flexion to 3+/5, L ankle DF to 3+/5 to allow patient to report able to walk x 30 minutes at store with cart without rest break  3. I with HEP as prescribed to allow patient to improve Tinetti score balance to 18/28 with no falls x 3 weeks      Long Term Goals:  Time Frame: 8 weeks  1. Increase AROM B hip flexion to 50, L knee flexion to 130 degrees to allow patient to report able to walk household distance without device with no LOB  2. Increase strength B LE to 4-/5 to allow patient to report able to walk for exercise for 10 minutes without fall or rest break  3. I with HEP as prescribed to allow patient to improve balance Tinetti score 22/28 with patient to report no falls x 6 weeks  Patient Education:   [x]  HEP/Education Completed: continue with HEP 2x's a day   [employer] Access Code: [email or web address]  []  No new Education completed  []  Reviewed Prior HEP      []  Patient verbalized and/or demonstrated understanding of education provided. []  Patient unable to verbalize and/or demonstrate understanding of education provided. Will continue education. [x]  Barriers to learning: memory issues- needs handouts    PLAN:  Treatment Recommendations: Strengthening, Range of Motion, Balance Training, Endurance Training, Gait Training, Home Exercise Program, Patient Education and Safety Education and Training      Plan to see patient 2 times per week for 8 weeks to address the treatment planned outlined above.   [x]  Continue with current plan of care  []  Modify plan of care as follows:    []  Hold pending physician visit  []  Discharge    Time In 1100   Time Out 1130   Timed Code Minutes: 30 min   Total Treatment Time: 30 min       Electronically Signed by: Alfie Lakhani PT

## 2022-05-31 NOTE — PROGRESS NOTES
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 AQUATIC THERAPY COVERED:   Yes  MODALITIES COVERED:  Yes, with the exception of iontophoresis and hot packs/cold packs  TELEHEALTH COVERED: Yes   Visit # 6, 6/10 for progress note   Visits Allowed: Unlimited   Recertification Date: 3/89/00   Physician Follow-Up: Follows up with Dr. Stephany Dow in 2022   Physician Orders: Evaluate and treat   Pertinent History: Patient had CVA 5 or 10 years ago. Patient states that she was in [place] and was in the hospital for a few days and then had outpatient therapy at that time. Patient states that she was told to use an assistive device, but she hasn't used it consistently. States that she fell about a month ago and hit her head. States that she didn't seek medical attention at that time. Comorbidities include HTN, anxiety, memory issues, fibromyalgia, stroke, and depression that could influence rehab process.      SUBJECTIVE: Patient reports she has not yet received a shot in her back, had to take care of other doctor appointments. Patient reports she carried her puppies over the weekend (estimated 8#). OBJECTIVE:      TREATMENT   Precautions: HTN, anxiety, depression, anxiety   Pain:  4/10 LOCATION: left upper arm    X in shaded column indicates Activity Completed Today   Modalities Parameters/  Location  Notes/Comments                     Manual Therapy Time/  Technique  Notes/Comments                     Exercises   Sets/  Sec Reps  Notes/Comments   ergogripper with 2 blue and 1 green band 1 20 with each UE x    Reaching endurance activity for both UE strength and bilateral pinch/ strength 1   Put on vertical post with right UE and removed with left UE   Medium theraputty - digging beads out using both UE   x    Medium theraputty - taffy pulls 1 3 minutes  Needed repeated instruction for proper completion   Pegs (small colored) and pegboard 1 10 pegs x Place with R hand, and when removing pocket pegs into R hand for increased challenge. Demonstration on removal and pocketing, fair understanding, able to pocket 10/10, poor coordination to dispense in lid                 Activities Time    Notes/Comments   MRMT activities - placed with each UE; flipped with both UE at same time; flipping/displacing one hand at a time; putting discs back into case using both UE at same time   Patient had difficult time flipping discs at same time with both UE; patient had difficulty following directions when the directions changed; did report fatigue in UE. Nuts and bolts  x Difficulty with smaller nuts/bolts.      Tie/untie knots in small rope      Had patient  pegs one color at a time and nested pegs in palm  x Fair coordination and removal after pocketing    Discussion regarding how to safely plant flowers at home   Recommended for patient to plant ramos in pots versus the ground and to use her walker at all times at home (inside and outside)     Specific Interventions Next Treatment: coordination,  strengthening, pinch strengthening, advanced ADL/safety    Activity/Treatment Tolerance:  [x]  Patient tolerated treatment well  []  Patient limited by fatigue  []  Patient limited by pain   []  Patient limited by other medical complications  []  Other:     Assessment: Patient progressing slowly toward goals. Continues to be tangential during session. Patient has 1 remaining scheduled OT session with primary OTR/L, with plans to discharge at this time. Fair coordination throughout smaller [address] this date. GOALS:  Patient Goal: To get stronger. To be able to do more in the house. Short Term Goals:  Time Frame: 4 weeks  1. Be independent with HEP as instructed to increase her ability to use can opener. 2. Be able to stand to complete simple UE activity without assistance and no loss of balance. 3. Increase bilateral  strength to at least 30# to increase her ability to open containers. Long Term Goals:  Time Frame: 12 weeks  1. Patient and  will report follow through with home safety recommendations to assist with patient's independence. 2. Improve bilateral hand coordination as evidenced by her ability to complete 9 hole peg test with right hand in  30 seconds and left hand in 37 seconds to increase her ability to complete basic household chores. Patient Education:   [x]  HEP/Education Completed: reviewed household objects to place in thera putty that patient reports she ordered for continuation of HEP after discharge      []  No new Education completed  []  Reviewed Prior HEP      [x]  Patient verbalized and/or demonstrated understanding of education provided. []  Patient unable to verbalize and/or demonstrate understanding of education provided. Will continue education. [x]  Barriers to learning: decreased memory    PLAN:      []  Plan of care initiated.   Plan to see patient 2 times per week for  12 weeks to address the treatment planned outlined above.   [x]  Continue with current plan of care  []  Modify plan of care as follows:    []  Hold pending physician visit  []  Discharge    Time In 1000   Time Out 1028   Timed Code Minutes: 28 min   Total Treatment Time: 28 min       Garth APARICIO #[id number]

## 2022-06-30 ENCOUNTER — TELEPHONE (OUTPATIENT)
Dept: PSYCHOLOGY | Age: 67
End: 2022-06-30

## 2022-06-30 NOTE — TELEPHONE ENCOUNTER
Patient called. Upset because her brother Srinivas Weiner has been feeling suicidal, is now on 4E at [facility]. Amy. We talked about coping strategies and possible resources, which she asked me to message her on [employer].

## 2022-07-13 ENCOUNTER — APPOINTMENT (OUTPATIENT)
Dept: CT IMAGING | Age: 67
End: 2022-07-13
Payer: MEDICARE

## 2022-07-13 ENCOUNTER — HOSPITAL ENCOUNTER (EMERGENCY)
Age: 67
Discharge: HOME | End: 2022-07-14
Payer: MEDICARE

## 2022-07-13 DIAGNOSIS — S06.0X0A CONCUSSION WITHOUT LOSS OF CONSCIOUSNESS, INITIAL ENCOUNTER: Primary | ICD-10-CM
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
## 2022-07-13 DIAGNOSIS — S00.03XA HEMATOMA OF SCALP, INITIAL ENCOUNTER: ICD-10-CM

## 2022-07-13 LAB
ALBUMIN SERPL-MCNC: 4.3 G/DL (ref 3.5–5.1)
ALP BLD-CCNC: 90 U/L (ref 38–126)
ALT SERPL-CCNC: 24 U/L (ref 11–66)
ANION GAP SERPL CALCULATED.3IONS-SCNC: 9 MEQ/L (ref 8–16)
AST SERPL-CCNC: 24 U/L (ref 5–40)
BASOPHILS # BLD: 0.6 %
BASOPHILS ABSOLUTE: 0 THOU/MM3 (ref 0–0.1)
BILIRUB SERPL-MCNC: < 0.2 MG/DL (ref 0.3–1.2)
BILIRUBIN DIRECT: < 0.2 MG/DL (ref 0–0.3)
BUN BLDV-MCNC: 40 MG/DL (ref 7–22)
CALCIUM SERPL-MCNC: 9.4 MG/DL (ref 8.5–10.5)
CHLORIDE BLD-SCNC: 105 MEQ/L (ref 98–111)
CO2: 29 MEQ/L (ref 23–33)
CREAT SERPL-MCNC: 1.9 MG/DL (ref 0.4–1.2)
EOSINOPHIL # BLD: 1.9 %
EOSINOPHILS ABSOLUTE: 0.1 THOU/MM3 (ref 0–0.4)
ERYTHROCYTE [DISTWIDTH] IN BLOOD BY AUTOMATED COUNT: 12.8 % (ref 11.5–14.5)
ERYTHROCYTE [DISTWIDTH] IN BLOOD BY AUTOMATED COUNT: 46.4 FL (ref 35–45)
GFR SERPL CREATININE-BSD FRML MDRD: 26 ML/MIN/1.73M2
GLUCOSE BLD-MCNC: 87 MG/DL (ref 70–108)
HCT VFR BLD CALC: 35.5 % (ref 37–47)
HEMOGLOBIN: 11.2 GM/DL (ref 12–16)
IMMATURE GRANS (ABS): 0.03 THOU/MM3 (ref 0–0.07)
IMMATURE GRANULOCYTES: 0.5 %
LYMPHOCYTES # BLD: 32.3 %
LYMPHOCYTES ABSOLUTE: 2.1 THOU/MM3 (ref 1–4.8)
MCH RBC QN AUTO: 31.1 PG (ref 26–33)
MCHC RBC AUTO-ENTMCNC: 31.5 GM/DL (ref 32.2–35.5)
MCV RBC AUTO: 98.6 FL (ref 81–99)
MONOCYTES # BLD: 8.2 %
MONOCYTES ABSOLUTE: 0.5 THOU/MM3 (ref 0.4–1.3)
NUCLEATED RED BLOOD CELLS: 0 /100 WBC
OSMOLALITY CALCULATION: 294.1 MOSMOL/KG (ref 275–300)
PLATELET # BLD: 215 THOU/MM3 (ref 130–400)
PMV BLD AUTO: 10 FL (ref 9.4–12.4)
POTASSIUM REFLEX MAGNESIUM: 4.3 MEQ/L (ref 3.5–5.2)
RBC # BLD: 3.6 MILL/MM3 (ref 4.2–5.4)
SEG NEUTROPHILS: 56.5 %
SEGMENTED NEUTROPHILS ABSOLUTE COUNT: 3.7 THOU/MM3 (ref 1.8–7.7)
SODIUM BLD-SCNC: 143 MEQ/L (ref 135–145)
TOTAL PROTEIN: 6.7 G/DL (ref 6.1–8)
TROPONIN T: < 0.01 NG/ML
WBC # BLD: 6.5 THOU/MM3 (ref 4.8–10.8)

## 2022-07-13 PROCEDURE — 84484 ASSAY OF TROPONIN QUANT: CPT

## 2022-07-13 PROCEDURE — 80048 BASIC METABOLIC PNL TOTAL CA: CPT

## 2022-07-13 PROCEDURE — 85025 COMPLETE CBC W/AUTO DIFF WBC: CPT

## 2022-07-13 PROCEDURE — 80076 HEPATIC FUNCTION PANEL: CPT

## 2022-07-13 PROCEDURE — 93005 ELECTROCARDIOGRAM TRACING: CPT | Performed by: NURSE PRACTITIONER

## 2022-07-13 PROCEDURE — 72125 CT NECK SPINE W/O DYE: CPT

## 2022-07-13 PROCEDURE — 99284 EMERGENCY DEPT VISIT MOD MDM: CPT

## 2022-07-13 PROCEDURE — 81001 URINALYSIS AUTO W/SCOPE: CPT

## 2022-07-13 PROCEDURE — 70450 CT HEAD/BRAIN W/O DYE: CPT

## 2022-07-13 PROCEDURE — 6370000000 HC RX 637 (ALT 250 FOR IP): Performed by: NURSE PRACTITIONER

## 2022-07-13 RX ORDER — HYDROCODONE BITARTRATE AND ACETAMINOPHEN 5; 325 MG/1; MG/1
1 TABLET ORAL ONCE
Status: COMPLETED | OUTPATIENT
Start: 2022-07-13 | End: 2022-07-13

## 2022-07-13 RX ADMIN — HYDROCODONE BITARTRATE AND ACETAMINOPHEN 1 TABLET: 5; 325 TABLET ORAL at 23:30

## 2022-07-13 ASSESSMENT — PAIN DESCRIPTION - LOCATION
LOCATION: HEAD
LOCATION: HEAD

## 2022-07-13 ASSESSMENT — PAIN SCALES - GENERAL
PAINLEVEL_OUTOF10: 8
PAINLEVEL_OUTOF10: 7

## 2022-07-13 ASSESSMENT — ENCOUNTER SYMPTOMS
VOMITING: 0
WHEEZING: 0
NAUSEA: 0
ABDOMINAL PAIN: 0
EYE PAIN: 0
DIARRHEA: 0
COUGH: 0
RHINORRHEA: 0
CONSTIPATION: 0
SHORTNESS OF BREATH: 0
BLOOD IN STOOL: 0

## 2022-07-13 ASSESSMENT — PAIN - FUNCTIONAL ASSESSMENT: PAIN_FUNCTIONAL_ASSESSMENT: 0-10

## 2022-07-13 ASSESSMENT — VISUAL ACUITY: OU: 1

## 2022-07-14 ENCOUNTER — OFFICE VISIT (OUTPATIENT)
Dept: FAMILY MEDICINE CLINIC | Age: 67
End: 2022-07-14
Payer: MEDICARE

## 2022-07-14 VITALS
HEART RATE: 82 BPM | DIASTOLIC BLOOD PRESSURE: 61 MMHG | TEMPERATURE: 97.6 F | SYSTOLIC BLOOD PRESSURE: 141 MMHG | OXYGEN SATURATION: 94 % | RESPIRATION RATE: 17 BRPM

## 2022-07-14 VITALS
HEART RATE: 70 BPM | WEIGHT: 119.8 LBS | OXYGEN SATURATION: 99 % | DIASTOLIC BLOOD PRESSURE: 74 MMHG | BODY MASS INDEX: 23.52 KG/M2 | HEIGHT: 60 IN | SYSTOLIC BLOOD PRESSURE: 122 MMHG | RESPIRATION RATE: 16 BRPM | TEMPERATURE: 98.8 F
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
## 2022-07-14 DIAGNOSIS — M54.2 NECK PAIN: ICD-10-CM
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
## 2022-07-14 DIAGNOSIS — S00.03XD HEMATOMA OF SCALP, SUBSEQUENT ENCOUNTER: ICD-10-CM

## 2022-07-14 DIAGNOSIS — E78.00 PURE HYPERCHOLESTEROLEMIA: ICD-10-CM

## 2022-07-14 LAB
BACTERIA: ABNORMAL
BILIRUBIN URINE: NEGATIVE
BLOOD, URINE: NEGATIVE
CASTS: ABNORMAL /LPF
CASTS: ABNORMAL /LPF
CHARACTER, URINE: CLEAR
COLOR: YELLOW
CRYSTALS: ABNORMAL
EKG ATRIAL RATE: 74 BPM
EKG P AXIS: 32 DEGREES
EKG P-R INTERVAL: 146 MS
EKG Q-T INTERVAL: 372 MS
EKG QRS DURATION: 92 MS
EKG QTC CALCULATION (BAZETT): 412 MS
EKG R AXIS: 73 DEGREES
EKG T AXIS: 62 DEGREES
EKG VENTRICULAR RATE: 74 BPM
EPITHELIAL CELLS, UA: ABNORMAL /HPF
GLUCOSE, URINE: NEGATIVE MG/DL
KETONES, URINE: NEGATIVE
LEUKOCYTE ESTERASE, URINE: ABNORMAL
MISCELLANEOUS LAB TEST RESULT: ABNORMAL
NITRITE, URINE: NEGATIVE
PH UA: 7.5 (ref 5–9)
PROTEIN UA: NEGATIVE MG/DL
RBC URINE: ABNORMAL /HPF
RENAL EPITHELIAL, UA: ABNORMAL
SPECIFIC GRAVITY UA: 1.01 (ref 1–1.03)
UROBILINOGEN, URINE: 0.2 EU/DL (ref 0–1)
WBC UA: ABNORMAL /HPF
YEAST: ABNORMAL

## 2022-07-14 PROCEDURE — G8399 PT W/DXA RESULTS DOCUMENT: HCPCS | Performed by: FAMILY MEDICINE

## 2022-07-14 PROCEDURE — 3017F COLORECTAL CA SCREEN DOC REV: CPT | Performed by: FAMILY MEDICINE

## 2022-07-14 PROCEDURE — G8420 CALC BMI NORM PARAMETERS: HCPCS | Performed by: FAMILY MEDICINE

## 2022-07-14 PROCEDURE — 99213 OFFICE O/P EST LOW 20 MIN: CPT | Performed by: FAMILY MEDICINE

## 2022-07-14 PROCEDURE — 1090F PRES/ABSN URINE INCON ASSESS: CPT | Performed by: FAMILY MEDICINE

## 2022-07-14 PROCEDURE — 1123F ACP DISCUSS/DSCN MKR DOCD: CPT | Performed by: FAMILY MEDICINE

## 2022-07-14 PROCEDURE — G8427 DOCREV CUR MEDS BY ELIG CLIN: HCPCS | Performed by: FAMILY MEDICINE

## 2022-07-14 PROCEDURE — 93010 ELECTROCARDIOGRAM REPORT: CPT | Performed by: INTERNAL MEDICINE

## 2022-07-14 PROCEDURE — 1036F TOBACCO NON-USER: CPT | Performed by: FAMILY MEDICINE

## 2022-07-14 NOTE — ED NOTES
Pt ambulated to restroom with walker at this time to obtain urine sample.       Nilam Guerrero RN  07/13/22 [id number]

## 2022-07-14 NOTE — PATIENT INSTRUCTIONS
May use topical agents for muscle relaxation and pain in neck and upper back. Moist heat on muscles. Gentle and slow stretching. Neck: Exercises  Introduction  Here are some examples of exercises for you to try. The exercises may be suggested for a condition or for rehabilitation. Start each exercise slowly. Ease off the exercises if you start to have pain. You will be told when to start these exercises and which ones will work bestfor you. How to do the exercises  Neck stretch    1. This stretch works best if you keep your shoulder down as you lean away from it. To help you remember to do this, start by relaxing your shoulders and lightly holding on to your thighs or your chair. 2. Tilt your head toward your shoulder and hold for 15 to 30 seconds. Let the weight of your head stretch your muscles. 3. If you would like a little added stretch, use your hand to gently and steadily pull your head toward your shoulder. For example, keeping your right shoulder down, lean your head to the left. 4. Repeat 2 to 4 times toward each shoulder. Diagonal neck stretch    1. Turn your head slightly toward the direction you will be stretching, and tilt your head diagonally toward your chest and hold for 15 to 30 seconds. 2. If you would like a little added stretch, use your hand to gently and steadily pull your head forward on the diagonal.  3. Repeat 2 to 4 times toward each side. Dorsal glide stretch    The dorsal glide stretches the back of the neck. If you feel pain, do not glide so far back. Some people find this exercise easier to do while lying on theirbacks with an ice pack on the neck. 1. Sit or stand tall and look straight ahead. 2. Slowly tuck your chin as you glide your head backward over your body  3. Hold for a count of 6, and then relax for up to 10 seconds. 4. Repeat 8 to 12 times. Chest and shoulder stretch    1.  Sit or stand tall and glide your head backward as in the dorsal glide stretch. 2. Raise both arms so that your hands are next to your ears. 3. Take a deep breath, and as you breathe out, lower your elbows down and behind your back. You will feel your shoulder blades slide down and together, and at the same time you will feel a stretch across your chest and the front of your shoulders. 4. Hold for about 6 seconds, and then relax for up to 10 seconds. 5. Repeat 8 to 12 times. Strengthening: Hands on head    1. Move your head backward, forward, and side to side against gentle pressure from your hands, holding each position for about 6 seconds. 2. Repeat 8 to 12 times. Follow-up care is a key part of your treatment and safety. Be sure to make and go to all appointments, and call your doctor if you are having problems. It's also a good idea to know your test results and keep alist of the medicines you take. Where can you learn more? Go to https://[employer]. org and sign in to your [employer] account. Enter P975 in the [employer] box to learn more about \"Neck: Exercises. \"     If you do not have an account, please click on the \"Sign Up Now\" link. Current as of: March 9, 2022               Content Version: 13.3  © 2006-2022 [employer]. Care instructions adapted under license by [facility] [place] ([facility]). If you have questions about a medical condition or this instruction, always ask your healthcare professional. [place] [address] any warranty or liability for your use of this information.
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
## 2022-07-14 NOTE — ED NOTES
Pt to ED c/o fall and head injury. Pt states she was leaning over when she tripped on her shoes and fell backwards from standing height. Pt takes aspirin and plavix. Pt denies LOC. Pt has a large bump on back of head; pain 8/10. Pt took tylenol with no relief. Pt states feeling dizzy; denies N/V. EKG complete.  Will monitor      Nehemias Vaz RN  07/13/22 3136

## 2022-07-19 ENCOUNTER — TELEPHONE (OUTPATIENT)
Dept: FAMILY MEDICINE CLINIC | Age: 67
End: 2022-07-19

## 2022-07-19 NOTE — TELEPHONE ENCOUNTER
As not having any neurological deficits, recommend office visit. If nausea/vomiting occur and signs of stroke or vision changes occur, then recommend ER.

## 2022-07-20 ENCOUNTER — TELEPHONE (OUTPATIENT)
Dept: FAMILY MEDICINE CLINIC | Age: 67
End: 2022-07-20

## 2022-07-21 ENCOUNTER — OFFICE VISIT (OUTPATIENT)
Dept: FAMILY MEDICINE CLINIC | Age: 67
End: 2022-07-21
Payer: MEDICARE

## 2022-07-21 ENCOUNTER — TELEMEDICINE (OUTPATIENT)
Dept: PSYCHOLOGY | Age: 67
End: 2022-07-21
Payer: MEDICARE

## 2022-07-21 VITALS
TEMPERATURE: 98.3 F | OXYGEN SATURATION: 98 % | HEART RATE: 97 BPM | RESPIRATION RATE: 18 BRPM | SYSTOLIC BLOOD PRESSURE: 124 MMHG | BODY MASS INDEX: 23.36 KG/M2 | DIASTOLIC BLOOD PRESSURE: 78 MMHG | WEIGHT: 119 LBS | HEIGHT: 60 IN

## 2022-07-21 DIAGNOSIS — S00.03XD HEMATOMA OF SCALP, SUBSEQUENT ENCOUNTER: ICD-10-CM
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
## 2022-07-21 DIAGNOSIS — F33.0 MILD EPISODE OF RECURRENT MAJOR DEPRESSIVE DISORDER (HCC): Primary | ICD-10-CM
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
## 2022-07-21 DIAGNOSIS — F41.9 ANXIETY: Primary | ICD-10-CM

## 2022-07-21 DIAGNOSIS — R51.9 NONINTRACTABLE HEADACHE, UNSPECIFIED CHRONICITY PATTERN, UNSPECIFIED HEADACHE TYPE: ICD-10-CM

## 2022-07-21 PROCEDURE — 90834 PSYTX W PT 45 MINUTES: CPT | Performed by: PSYCHOLOGIST

## 2022-07-21 PROCEDURE — 1123F ACP DISCUSS/DSCN MKR DOCD: CPT | Performed by: FAMILY MEDICINE

## 2022-07-21 PROCEDURE — G8427 DOCREV CUR MEDS BY ELIG CLIN: HCPCS | Performed by: FAMILY MEDICINE

## 2022-07-21 PROCEDURE — 1036F TOBACCO NON-USER: CPT | Performed by: FAMILY MEDICINE

## 2022-07-21 PROCEDURE — G8420 CALC BMI NORM PARAMETERS: HCPCS | Performed by: FAMILY MEDICINE

## 2022-07-21 PROCEDURE — 3017F COLORECTAL CA SCREEN DOC REV: CPT | Performed by: FAMILY MEDICINE

## 2022-07-21 PROCEDURE — 99213 OFFICE O/P EST LOW 20 MIN: CPT | Performed by: FAMILY MEDICINE

## 2022-07-21 PROCEDURE — 1123F ACP DISCUSS/DSCN MKR DOCD: CPT | Performed by: PSYCHOLOGIST

## 2022-07-21 PROCEDURE — 1090F PRES/ABSN URINE INCON ASSESS: CPT | Performed by: FAMILY MEDICINE

## 2022-07-21 PROCEDURE — G8399 PT W/DXA RESULTS DOCUMENT: HCPCS | Performed by: FAMILY MEDICINE

## 2022-07-21 RX ORDER — HYDROXYZINE HYDROCHLORIDE 10 MG/1
10 TABLET, FILM COATED ORAL EVERY 8 HOURS PRN
Qty: 60 TABLET | Refills: 2 | Status: SHIPPED | OUTPATIENT
Start: 2022-07-21 | End: 2022-09-19

## 2022-07-21 NOTE — PROGRESS NOTES
her anxiety. She is not on Wellbutrin 200 mg twice daily plus Depakote  mg daily plus trazodone 50 mg nightly as needed. She takes a vitamin B complex. Atarax in the past has made her too tired. 25 mg dose given. Buspar tried and was minimally helpful. Headaches -- tylenol 8 taken all together at night  No nausea/vomiting. Same balance troubles  hemp topical  Fiber supplement  Cbd gummies being tried. Review of Systems     No fever/chills. Objective   Physical Exam   Gen: NAD, AAO x 3, coherent, pleasant. Rather anxious  Scalp hematoma smaller, softer, mildly tender    CTAb. RRR    Neuro: CNII-XII intact, peripheral vision normal, UE and LE sensation and strength at baseline no tremors, no pronator drift. An electronic signature was used to authenticate this note.     --Caridad Jimenez MD

## 2022-07-21 NOTE — PROGRESS NOTES
[address] [address]  [address]  [place]  Dept: [phone]  Dept Fax: [phone]: [phone]      Patient: Law Bain  Visit Date: 7/21/2022  Referring Provider:   No ref. provider found    SUBJECTIVE DATA      Law Bain, was evaluated through a synchronous (real-time) audio-video encounter. The patient (or guardian if applicable) is aware that this is a billable service, which includes applicable co-pays. Patient identification was verified, and a caregiver was present when appropriate. The patient was located in a state where the provider was licensed to provide care. This Virtual Visit was conducted with patient's (and/or legal guardian's) consent. The visit was conducted pursuant to the emergency declaration under the [address] [place] [address], [address] [place] [address] authority and the [employer] Act. Patient location: Home  Provider location: Home or office     CHIEF COMPLAINT:    Chief Complaint   Patient presents with    Anxiety    Depression       Patient update:    . Patient reports   She fell and got a concussion a while back  Has h/a ever since, but puppies bark all the time, which gives her a worse headache  Discussed ways to get some peace and quiet for a few days  Some photosensitivity and   Considering going to stay with friend Ematank Tian, who has a quiet and welcoming place  Brother Odalys Davison went to the psych unit, currently at a place outside of [place]  Very distressed about not being able to help Odalys Angry out  Having a lot of pain in back and arm, wants Dr. Anjana Lim to do another shot  Patient has been doing a lot of calling around for tx options  Decided she has to let go of the problem  Agrees to let Sailaja Garica or Mikayla Valdivia take things over      OBJECTIVE DATA     Physical Exam:    Mental Status Evaluation:   Orientation: Alert, oriented. Mood:.  Anxious Affect:  Anxious, mood congruent      Appearance:  Normal   Activity:  Normal   Gait/Posture: Halting, as per usual, shuffling, short steps   Speech:  Normal, talkative   Thought Process: Tangential   Thought Content: Within Normal Limits   Cognition:  Normal   Memory: Normal   Insight:  good   Judgment: Normal   Suicidal Ideations: Denies Suicidal Ideations   Homicidal Ideations: Denies Homicidal Ideations   Medication Side Effects: absent       Attention Span Normal     Screenings Completed in This Encounter:                                 DIAGNOSIS AND ASSESSMENT DATA     DIAGNOSIS:  See visit diagnoses. Treating her for Major depression, recurrent, and generalized anxiety disorder, as well as an old CVA with sequelae  Generally functioning well, at a good place for now. Doing OK with maintenance sessions. Anxiety continues to be an issue, with reassurance-seeking her most common coping strategy. I want her to use cognitive reframing more freely. Focus on meaning & purpose, being there for others, which she is quite good at  Back to good self-care  Better use of boundaries on family and friends    ASSESSMENT/PLAN   Follow-up:  No follow-ups on file. Homework assignment before next session:     [] Practice deep breathing 1-2 times per day for 15 minutes, as demonstrated in session, and/or:    [] Go to [employer] [facility]\" web site and begin practicing with their free meditation podcasts    [x] Track thoughts that you think feed anxiety or depression    [] Go to [employer]\" web site, open up the \"Workbooks\" tab, and select a problem from the list that best suits your needs. Review the first module. [x] Begin to track physical activity.  Even five minutes per day will be helpful.    [] Talk with your physician about whether it's OK to start fish oil (burpless) or flax oil, 1000 to 1200 mg per day    [] Most importantly, remember this guideline: \"Don't believe everything you think! \"   :)    [] Try \"Expressive Writing\" using the guidelines on the handout    Head injury guidelines, as discussed   Continue enjoying time with friends on a regular basis. Practice daily relaxation training, again. [facility]--explore options   Continue to focus on improving quality of life, for now  Recognize your strengths, which include facilitating connections between other people. Spend time with friends, not shopping with [place]  Continue with self-care and \"Serenity Prayer\" for letting go of things you cannot control or fix. Try a new [employer]--[employer] on brother Agnes, as discussed in session  Due to memory challenges, I had patient write down our instructions    Session lasted 41 minutes.     Provider Signature:  Electronically signed by Danisha Alonzo, PhD on 7/21/2022 at 12:44 PM

## 2022-08-11 ENCOUNTER — OFFICE VISIT (OUTPATIENT)
Dept: FAMILY MEDICINE CLINIC | Age: 67
End: 2022-08-11
Payer: MEDICARE

## 2022-08-11 DIAGNOSIS — U07.1 COVID-19 VIRUS INFECTION: Primary | ICD-10-CM

## 2022-08-11 DIAGNOSIS — R05.9 COUGH: ICD-10-CM

## 2022-08-11 DIAGNOSIS — N18.4 STAGE 4 CHRONIC KIDNEY DISEASE (HCC): ICD-10-CM

## 2022-08-11 DIAGNOSIS — U07.1 COVID: ICD-10-CM

## 2022-08-11 PROBLEM — N18.30 CKD (CHRONIC KIDNEY DISEASE) STAGE 3, GFR 30-59 ML/MIN (HCC): Status: RESOLVED | Noted: 2018-01-29 | Resolved: 2022-08-11

## 2022-08-11 LAB
Lab: ABNORMAL
QC PASS/FAIL: ABNORMAL
SARS-COV-2 RDRP RESP QL NAA+PROBE: POSITIVE

## 2022-08-11 PROCEDURE — 1123F ACP DISCUSS/DSCN MKR DOCD: CPT | Performed by: FAMILY MEDICINE

## 2022-08-11 PROCEDURE — G8427 DOCREV CUR MEDS BY ELIG CLIN: HCPCS | Performed by: FAMILY MEDICINE

## 2022-08-11 PROCEDURE — 3017F COLORECTAL CA SCREEN DOC REV: CPT | Performed by: FAMILY MEDICINE

## 2022-08-11 PROCEDURE — 87635 SARS-COV-2 COVID-19 AMP PRB: CPT | Performed by: FAMILY MEDICINE

## 2022-08-11 PROCEDURE — 1090F PRES/ABSN URINE INCON ASSESS: CPT | Performed by: FAMILY MEDICINE

## 2022-08-11 PROCEDURE — G8399 PT W/DXA RESULTS DOCUMENT: HCPCS | Performed by: FAMILY MEDICINE

## 2022-08-11 PROCEDURE — 99214 OFFICE O/P EST MOD 30 MIN: CPT | Performed by: FAMILY MEDICINE

## 2022-08-11 RX ORDER — DIPHENHYDRAMINE HYDROCHLORIDE 50 MG/ML
50 INJECTION INTRAMUSCULAR; INTRAVENOUS
OUTPATIENT
Start: 2022-08-11

## 2022-08-11 RX ORDER — ACETAMINOPHEN 325 MG/1
650 TABLET ORAL
OUTPATIENT
Start: 2022-08-11

## 2022-08-11 RX ORDER — ALBUTEROL SULFATE 90 UG/1
4 AEROSOL, METERED RESPIRATORY (INHALATION) PRN
OUTPATIENT
Start: 2022-08-11

## 2022-08-11 RX ORDER — EPINEPHRINE 1 MG/ML
0.3 INJECTION, SOLUTION, CONCENTRATE INTRAVENOUS PRN
OUTPATIENT
Start: 2022-08-11

## 2022-08-11 RX ORDER — SODIUM CHLORIDE 0.9 % (FLUSH) 0.9 %
5-40 SYRINGE (ML) INJECTION PRN
OUTPATIENT
Start: 2022-08-11

## 2022-08-11 RX ORDER — SODIUM CHLORIDE 9 MG/ML
INJECTION, SOLUTION INTRAVENOUS CONTINUOUS
OUTPATIENT
Start: 2022-08-11

## 2022-08-11 RX ORDER — BEBTELOVIMAB 87.5 MG/ML
175 INJECTION, SOLUTION INTRAVENOUS ONCE
Status: CANCELLED | OUTPATIENT
Start: 2022-08-11 | End: 2022-08-11

## 2022-08-11 RX ORDER — ONDANSETRON 2 MG/ML
8 INJECTION INTRAMUSCULAR; INTRAVENOUS
OUTPATIENT
Start: 2022-08-11

## 2022-08-11 RX ORDER — HEPARIN SODIUM (PORCINE) LOCK FLUSH IV SOLN 100 UNIT/ML 100 UNIT/ML
500 SOLUTION INTRAVENOUS PRN
OUTPATIENT
Start: 2022-08-11

## 2022-08-11 RX ORDER — SODIUM CHLORIDE 9 MG/ML
5-250 INJECTION, SOLUTION INTRAVENOUS PRN
OUTPATIENT
Start: 2022-08-11

## 2022-08-11 ASSESSMENT — ENCOUNTER SYMPTOMS
DIARRHEA: 0
COUGH: 1
WHEEZING: 0
SHORTNESS OF BREATH: 0

## 2022-08-11 NOTE — PROGRESS NOTES
2022    TELEHEALTH EVALUATION -- Audio/Visual (During DNKIM-99 public health emergency)    HPI:    Rafi Espinal (:  1955) has requested an audio/video evaluation for the following concern(s):    Covid infection. Tested positive today. Started 2 days ago () with symptoms. No SOB. Has tiredness and decreased appetite and activity level. Having fevers. Pulse ox 97% at home. Has CKD stage IV, HTN, hx of CVA     present    Review of Systems   Constitutional:  Positive for activity change, appetite change, fatigue and fever. HENT:  Positive for ear pain. Respiratory:  Positive for cough. Negative for shortness of breath and wheezing. Gastrointestinal:  Negative for diarrhea. Neurological:  Positive for headaches. Prior to Visit Medications    Medication Sig Taking?  Authorizing Provider   hydrOXYzine HCl (ATARAX) 10 MG tablet Take 1 tablet by mouth every 8 hours as needed for Itching Yes Fabien Dougherty MD   montelukast (SINGULAIR) 10 MG tablet Take 1 tablet by mouth daily Yes Fabien Dougherty MD   simvastatin (ZOCOR) 20 MG tablet TAKE 1 TABLET BY MOUTH DAILY Yes Fabien Dougherty MD   clopidogrel (PLAVIX) 75 MG tablet TAKE 1 TABLET BY MOUTH DAILY Yes Fabien Dougherty MD   chlordiazePOXIDE-clidinium (LIBRAX) 5-2.5 MG per capsule TAKE 1 CAPSULE TWICE A DAY AS NEEDED FOR PAIN Yes Fabien Dougherty MD   amLODIPine (NORVASC) 5 MG tablet TAKE 1 TABLET BY MOUTH DAILY Yes Deland Eisenmenger, MD   buPROPion (WELLBUTRIN SR) 200 MG extended release tablet Take 1 tablet by mouth 2 times daily Yes Fabien Dougherty MD   traZODone (DESYREL) 50 MG tablet TAKE 1 TABLET NIGHTLY Yes Fabien Dougherty MD   divalproex (DEPAKOTE ER) 250 MG extended release tablet TAKE 1 TABLET BY MOUTH DAILY Yes Historical Provider, MD   bisacodyl (DULCOLAX) 5 MG EC tablet Take 5 mg by mouth daily as needed for Constipation Yes Historical Provider, MD   simethicone (MYLICON) 80 MG chewable tablet Take 80 mg by mouth 2 times daily Yes Historical Provider, MD   triamcinolone (NASACORT) 55 MCG/ACT nasal inhaler 2 sprays by Each Nostril route daily As needed Yes Historical Provider, MD   Polyethylene Glycol 3350 (MIRALAX PO) Take by mouth Yes Historical Provider, MD   cetirizine (ZYRTEC ALLERGY) 10 MG tablet Take 1 tablet by mouth daily Yes CARLOS MANUEL Gonzalez - CNP   Tetrahydrozoline-Zn Sulfate (EYE DROPS ALLERGY RELIEF OP) Apply 1 drop to eye daily Yes Historical Provider, MD   Probiotic Product (PROBIOTIC-10) CHEW Take by mouth daily Yes Historical Provider, MD   linaclotide (LINZESS) 290 MCG CAPS capsule Take 290 mcg by mouth every morning (before breakfast)  Yes Historical Provider, MD   denosumab (PROLIA) 60 MG/ML SOLN SC injection Inject 60 mg into the skin once Yes Historical Provider, MD   folic acid (FOLVITE) 603 MCG tablet Take 400 mcg by mouth daily Yes Historical Provider, MD   b complex vitamins capsule Take 1 capsule by mouth daily Yes Darylene Daisy, MD   FISH OIL 1,000 mg by Does not apply route 2 times daily Indications: Decreased Energy  Yes Historical Provider, MD   aspirin 81 MG tablet Take 81 mg by mouth daily.    Yes Historical Provider, MD       Social History     Tobacco Use    Smoking status: Never    Smokeless tobacco: Never   Vaping Use    Vaping Use: Never used   Substance Use Topics    Alcohol use: No     Alcohol/week: 0.0 standard drinks    Drug use: No        Allergies   Allergen Reactions    Pioglitazone Nausea And Vomiting     Other reaction(s): Unknown    Amoxicillin      Other reaction(s): Unknown    Amoxicillin-Pot Clavulanate Diarrhea     Other reaction(s): Unknown    Other Itching     Dog, cat, pet dander    Ciprofloxacin      unsure  Other reaction(s): Unknown    Sulfa Antibiotics Rash and Other (See Comments)     Other reaction(s): Unknown   ,   Past Medical History:   Diagnosis Date    Allergic rhinitis     Anxiety     CVA (cerebral infarction)     Depression     Fibromyalgia Headache(784.0)     Hyperlipidemia     Hypertension     Irritable bowel syndrome     Kidney failure     Unspecified cerebral artery occlusion with cerebral infarction     Unspecified sleep apnea        PHYSICAL EXAMINATION:  [ INSTRUCTIONS:  \"[x]\" Indicates a positive item  \"[]\" Indicates a negative item  -- DELETE ALL ITEMS NOT EXAMINED]    Constitutional: [] Appears well-developed and well-nourished [] No apparent distress      [x] Abnormal- appears ick  Mental status  [x] Alert and awake  [] Oriented to person/place/time [x]Able to follow commands      Eyes:  EOM    []  Normal  [] Abnormal-  Sclera  [x]  Normal  [] Abnormal -         Discharge [x]  None visible  [] Abnormal -      Pulmonary/Chest: [x] Respiratory effort normal.  [x] No visualized signs of difficulty breathing or respiratory distress        [] Abnormal-            Skin:        [x] No significant exanthematous lesions or discoloration noted on facial skin         [] Abnormal-            Psychiatric:       [x] Normal Affect [x] No Hallucinations        [] Abnormal-     Other pertinent observable physical exam findings-     ASSESSMENT/PLAN:  1. COVID-19 virus infection  Acute infection x2 days. No shortness of breath. Feels sick. High risk for complications given age, CKD stage IV, etc.  On room air. Does not qualify for Paxlovid due to CKD stage IV. She is interested in infusion of monoclonal antibodies if available. I did discuss that this would have an EUA with it and she was agreeable to proceeding. We will hold on antibiotic and steroids for now. I called and spoke with the pharmacist at [facility]. Ordered Bebtelovimab: Discussed EUA. To be given at [facility]. Called patient back and informed of IV infusion and plan. Our staff was asked to schedule patient at [place] [id number] for IV infusion which was done for tomorrow.   -Tylenol prn  -will involve care coordinator. 2. Stage 4 chronic kidney disease ([facility] [address]      3. Cough  Due to covid. - POCT COVID-19 Rapid, NAAT      Return if symptoms worsen or fail to improve. Ana Ford, was evaluated through a synchronous (real-time) audio-video encounter. The patient (or guardian if applicable) is aware that this is a billable service, which includes applicable co-pays. This Virtual Visit was conducted with patient's (and/or legal guardian's) consent. The visit was conducted pursuant to the emergency declaration under the [address] [place] [address] and the [employer] and [employer]. Patient identification was verified, and a caregiver was present when appropriate. The patient was located at Home: [place] [id number]. Provider was located at [facility]): [address] [place] [address]. Total time spent on this encounter: 33 minutes; billed by time  Time with patient:  16 minutes  Time coordinating care and documenting on date of service:  17 minutes      --Therese Norton MD on 8/11/2022 at 3:49 PM    An electronic signature was used to authenticate this note.

## 2022-08-12 ENCOUNTER — HOSPITAL ENCOUNTER (OUTPATIENT)
Dept: GENERAL RADIOLOGY | Age: 67
Discharge: HOME | End: 2022-08-12
Payer: MEDICARE

## 2022-08-12 ENCOUNTER — CARE COORDINATION (OUTPATIENT)
Dept: CARE COORDINATION | Age: 67
End: 2022-08-12

## 2022-08-12 ENCOUNTER — TELEPHONE (OUTPATIENT)
Dept: FAMILY MEDICINE CLINIC | Age: 67
End: 2022-08-12

## 2022-08-12 VITALS
RESPIRATION RATE: 18 BRPM | SYSTOLIC BLOOD PRESSURE: 147 MMHG | HEART RATE: 89 BPM | TEMPERATURE: 97.5 F | OXYGEN SATURATION: 95 % | DIASTOLIC BLOOD PRESSURE: 65 MMHG

## 2022-08-12 DIAGNOSIS — M81.0 AGE-RELATED OSTEOPOROSIS WITHOUT CURRENT PATHOLOGICAL FRACTURE: Primary | ICD-10-CM

## 2022-08-12 PROCEDURE — 6360000002 HC RX W HCPCS: Performed by: FAMILY MEDICINE

## 2022-08-12 RX ORDER — DIPHENHYDRAMINE HYDROCHLORIDE 50 MG/ML
50 INJECTION INTRAMUSCULAR; INTRAVENOUS
OUTPATIENT
Start: 2022-08-12

## 2022-08-12 RX ORDER — ONDANSETRON 2 MG/ML
8 INJECTION INTRAMUSCULAR; INTRAVENOUS
OUTPATIENT
Start: 2022-08-12

## 2022-08-12 RX ORDER — SODIUM CHLORIDE 0.9 % (FLUSH) 0.9 %
5-40 SYRINGE (ML) INJECTION PRN
OUTPATIENT
Start: 2022-08-12

## 2022-08-12 RX ORDER — SODIUM CHLORIDE 9 MG/ML
INJECTION, SOLUTION INTRAVENOUS CONTINUOUS
OUTPATIENT
Start: 2022-08-12

## 2022-08-12 RX ORDER — HEPARIN SODIUM (PORCINE) LOCK FLUSH IV SOLN 100 UNIT/ML 100 UNIT/ML
500 SOLUTION INTRAVENOUS PRN
OUTPATIENT
Start: 2022-08-12

## 2022-08-12 RX ORDER — BEBTELOVIMAB 87.5 MG/ML
175 INJECTION, SOLUTION INTRAVENOUS ONCE
Status: COMPLETED | OUTPATIENT
Start: 2022-08-12 | End: 2022-08-12

## 2022-08-12 RX ORDER — BEBTELOVIMAB 87.5 MG/ML
175 INJECTION, SOLUTION INTRAVENOUS ONCE
Status: CANCELLED | OUTPATIENT
Start: 2022-08-12 | End: 2022-08-12

## 2022-08-12 RX ORDER — SODIUM CHLORIDE 9 MG/ML
5-250 INJECTION, SOLUTION INTRAVENOUS PRN
OUTPATIENT
Start: 2022-08-12

## 2022-08-12 RX ORDER — ALBUTEROL SULFATE 90 UG/1
4 AEROSOL, METERED RESPIRATORY (INHALATION) PRN
OUTPATIENT
Start: 2022-08-12

## 2022-08-12 RX ORDER — ACETAMINOPHEN 325 MG/1
650 TABLET ORAL
OUTPATIENT
Start: 2022-08-12

## 2022-08-12 RX ADMIN — BEBTELOVIMAB 175 MG: 87.5 INJECTION, SOLUTION INTRAVENOUS at 12:06

## 2022-08-12 NOTE — CARE COORDINATION
Left message to return phone call to complete COVID follow up call. PCP referral for follow up.   Scheduled for infusion today at 12pm.

## 2022-08-12 NOTE — PROGRESS NOTES
Pt arrives for monoclonal antibody therapy ambulatory. Gait steady. Respirations easy and unlabored. Skin warm and dry. Pt reviewed medication EUA and is agreeable.

## 2022-08-12 NOTE — TELEPHONE ENCOUNTER
----- Message from Barry Tello sent at 8/11/2022  4:15 PM EDT -----  Subject: Message to Provider    QUESTIONS  Information for Provider? patient test for covid 19 8/11and she is asking   if medication could be called in to help her with the symptoms she is   experiencing . She would also alike instructing on having and needing to   stay away from people . Please reach out in this regards . Pt had   appointment today and she is still waiting on medication  ---------------------------------------------------------------------------  --------------  Maryam STONE  [phone]; OK to leave message on voicemail  ---------------------------------------------------------------------------  --------------  SCRIPT ANSWERS  Relationship to Patient?  Self

## 2022-08-12 NOTE — TELEPHONE ENCOUNTER
Left message on answering machine requesting pt to call back at earliest convenience. No medications called into pharmacy at this time, per visit note. Pt will need to quarantine for 5 days per CDC guidelines.

## 2022-08-12 NOTE — PROGRESS NOTES
Hour observation time completed. Denies any reaction s/s listed on the EUA for the Bebteloviamab. VSS. IV removed. Patient discharged home with spouse. Ambulatory at discharge.

## 2022-08-12 NOTE — CARE COORDINATION
Left message to return phone call to complete COVID follow up call. PCP referral for follow up.   2nd attempt

## 2022-08-15 ENCOUNTER — TELEMEDICINE (OUTPATIENT)
Dept: PSYCHOLOGY | Age: 67
End: 2022-08-15
Payer: MEDICARE

## 2022-08-15 ENCOUNTER — TELEPHONE (OUTPATIENT)
Dept: FAMILY MEDICINE CLINIC | Age: 67
End: 2022-08-15

## 2022-08-15 ENCOUNTER — CARE COORDINATION (OUTPATIENT)
Dept: CARE COORDINATION | Age: 67
End: 2022-08-15

## 2022-08-15 DIAGNOSIS — I10 ESSENTIAL HYPERTENSION: Chronic | ICD-10-CM

## 2022-08-15 DIAGNOSIS — F33.0 MILD EPISODE OF RECURRENT MAJOR DEPRESSIVE DISORDER (HCC): Primary | ICD-10-CM

## 2022-08-15 PROCEDURE — 90834 PSYTX W PT 45 MINUTES: CPT | Performed by: PSYCHOLOGIST

## 2022-08-15 PROCEDURE — 1123F ACP DISCUSS/DSCN MKR DOCD: CPT | Performed by: PSYCHOLOGIST

## 2022-08-15 PROCEDURE — 1036F TOBACCO NON-USER: CPT | Performed by: PSYCHOLOGIST

## 2022-08-15 NOTE — CARE COORDINATION
Left message to return phone call to complete COVID follow up call. 3rd attempt to reach with no return call from 's left.

## 2022-08-15 NOTE — TELEPHONE ENCOUNTER
Pt states that her gums are swollen since she has gotten Covid. Would like to be seen - no available appts in office for VV- referred pt to be seen at walk-in clinic. Also let her know to contact her dentist as this issue is dental related. Pt verbalized understanding and had no further questions.

## 2022-09-14 ENCOUNTER — HOSPITAL ENCOUNTER (OUTPATIENT)
Age: 67
Discharge: HOME | End: 2022-09-14
Payer: MEDICARE

## 2022-09-14 LAB
BUN BLDV-MCNC: 30 MG/DL (ref 7–22)
CALCIUM SERPL-MCNC: 9.2 MG/DL (ref 8.5–10.5)
CREAT SERPL-MCNC: 2.3 MG/DL (ref 0.4–1.2)
GFR SERPL CREATININE-BSD FRML MDRD: 21 ML/MIN/1.73M2
PHOSPHORUS: 3.8 MG/DL (ref 2.4–4.7)
VITAMIN D 25-HYDROXY: 35 NG/ML (ref 30–100)

## 2022-09-14 PROCEDURE — 84520 ASSAY OF UREA NITROGEN: CPT

## 2022-09-14 PROCEDURE — 82310 ASSAY OF CALCIUM: CPT

## 2022-09-14 PROCEDURE — 82565 ASSAY OF CREATININE: CPT

## 2022-09-14 PROCEDURE — 82306 VITAMIN D 25 HYDROXY: CPT

## 2022-09-14 PROCEDURE — 36415 COLL VENOUS BLD VENIPUNCTURE: CPT

## 2022-09-14 PROCEDURE — 84100 ASSAY OF PHOSPHORUS: CPT

## 2022-09-19 ENCOUNTER — OFFICE VISIT (OUTPATIENT)
Dept: NEPHROLOGY | Age: 67
End: 2022-09-19
Payer: MEDICARE

## 2022-09-19 VITALS
DIASTOLIC BLOOD PRESSURE: 82 MMHG | SYSTOLIC BLOOD PRESSURE: 130 MMHG | OXYGEN SATURATION: 95 % | HEART RATE: 79 BPM | BODY MASS INDEX: 23.67 KG/M2 | WEIGHT: 121.2 LBS

## 2022-09-19 DIAGNOSIS — N18.4 STAGE 4 CHRONIC KIDNEY DISEASE (HCC): Primary | ICD-10-CM

## 2022-09-19 DIAGNOSIS — I10 ESSENTIAL HYPERTENSION: ICD-10-CM

## 2022-09-19 DIAGNOSIS — N17.9 AKI (ACUTE KIDNEY INJURY) (HCC): ICD-10-CM

## 2022-09-19 PROCEDURE — G8399 PT W/DXA RESULTS DOCUMENT: HCPCS | Performed by: INTERNAL MEDICINE

## 2022-09-19 PROCEDURE — 1036F TOBACCO NON-USER: CPT | Performed by: INTERNAL MEDICINE

## 2022-09-19 PROCEDURE — G8427 DOCREV CUR MEDS BY ELIG CLIN: HCPCS | Performed by: INTERNAL MEDICINE

## 2022-09-19 PROCEDURE — G8420 CALC BMI NORM PARAMETERS: HCPCS | Performed by: INTERNAL MEDICINE

## 2022-09-19 PROCEDURE — 1090F PRES/ABSN URINE INCON ASSESS: CPT | Performed by: INTERNAL MEDICINE

## 2022-09-19 PROCEDURE — 3017F COLORECTAL CA SCREEN DOC REV: CPT | Performed by: INTERNAL MEDICINE

## 2022-09-19 PROCEDURE — 99213 OFFICE O/P EST LOW 20 MIN: CPT | Performed by: INTERNAL MEDICINE

## 2022-09-19 PROCEDURE — 1123F ACP DISCUSS/DSCN MKR DOCD: CPT | Performed by: INTERNAL MEDICINE

## 2022-09-19 NOTE — PROGRESS NOTES
[facility]        Outpatient Follow-Up note         9/19/2022 2:04 PM    Patient Name:   Hallie Lund:    1955  Primary Care Physician:  Sofie Anyaa MD      Chief Complaint / Reason for follow-up : Follow Up of CKD     Interval History :  Patient seen and examined by me. Feels well. No CP or SOB .   No hospitalizations and no med changes   Had some worsening of renal fx and so prolia      Past History :  Past Medical History:   Diagnosis Date    Allergic rhinitis     Anxiety     CVA (cerebral infarction)     Depression     Fibromyalgia     Headache(784.0)     Hyperlipidemia     Hypertension     Irritable bowel syndrome     Kidney failure     Unspecified cerebral artery occlusion with cerebral infarction     Unspecified sleep apnea      Past Surgical History:   Procedure Laterality Date    CARPAL TUNNEL RELEASE Right     COLONOSCOPY  2012    [facility]    ENDOSCOPY, COLON, DIAGNOSTIC  unsure    EYE SURGERY      lasik    HEMORRHOID SURGERY  unsure    HYSTERECTOMY (CERVIX STATUS UNKNOWN)      age 34    NECK SURGERY  18  years ago    fusion    OVARY REMOVAL Bilateral     age 34    SINUS SURGERY  10 years ago    TUBAL LIGATION          Medications :     Outpatient Medications Marked as Taking for the 9/19/22 encounter (Office Visit) with Corina Morales MD   Medication Sig Dispense Refill    montelukast (SINGULAIR) 10 MG tablet Take 1 tablet by mouth daily 30 tablet 5    simvastatin (ZOCOR) 20 MG tablet TAKE 1 TABLET BY MOUTH DAILY 90 tablet 3    clopidogrel (PLAVIX) 75 MG tablet TAKE 1 TABLET BY MOUTH DAILY 90 tablet 2    chlordiazePOXIDE-clidinium (LIBRAX) 5-2.5 MG per capsule TAKE 1 CAPSULE TWICE A DAY AS NEEDED FOR PAIN 180 capsule 3    amLODIPine (NORVASC) 5 MG tablet TAKE 1 TABLET BY MOUTH DAILY 90 tablet 3    buPROPion (WELLBUTRIN SR) 200 MG extended release tablet Take 1 tablet by mouth 2 times daily 180 tablet 3    traZODone (DESYREL) 50 MG tablet TAKE 1

## 2022-09-29 ENCOUNTER — HOSPITAL ENCOUNTER (OUTPATIENT)
Age: 67
Discharge: HOME | End: 2022-09-29
Payer: MEDICARE

## 2022-09-29 DIAGNOSIS — I10 ESSENTIAL HYPERTENSION: ICD-10-CM

## 2022-09-29 DIAGNOSIS — N17.9 AKI (ACUTE KIDNEY INJURY) (HCC): ICD-10-CM

## 2022-09-29 DIAGNOSIS — N18.4 STAGE 4 CHRONIC KIDNEY DISEASE (HCC): ICD-10-CM

## 2022-09-29 PROCEDURE — 36415 COLL VENOUS BLD VENIPUNCTURE: CPT

## 2022-09-29 PROCEDURE — 80048 BASIC METABOLIC PNL TOTAL CA: CPT

## 2022-09-29 PROCEDURE — 83970 ASSAY OF PARATHORMONE: CPT

## 2022-09-30 ENCOUNTER — TELEPHONE (OUTPATIENT)
Dept: NEPHROLOGY | Age: 67
End: 2022-09-30

## 2022-09-30 LAB
ANION GAP SERPL CALCULATED.3IONS-SCNC: 11 MEQ/L (ref 8–16)
BUN BLDV-MCNC: 27 MG/DL (ref 7–22)
CALCIUM SERPL-MCNC: 9.1 MG/DL (ref 8.5–10.5)
CHLORIDE BLD-SCNC: 104 MEQ/L (ref 98–111)
CO2: 27 MEQ/L (ref 23–33)
CREAT SERPL-MCNC: 1.8 MG/DL (ref 0.4–1.2)
GFR SERPL CREATININE-BSD FRML MDRD: 28 ML/MIN/1.73M2
GLUCOSE BLD-MCNC: 63 MG/DL (ref 70–108)
POTASSIUM SERPL-SCNC: 4.7 MEQ/L (ref 3.5–5.2)
PTH INTACT: 106 PG/ML (ref 15–65)
SODIUM BLD-SCNC: 142 MEQ/L (ref 135–145)

## 2022-09-30 NOTE — TELEPHONE ENCOUNTER
----- Message from Benji Reddy MD sent at 9/30/2022 11:31 AM EDT -----  Let patient know that the kidney numbers are better creatinine is down to 1.8 and the kidney function is 28% Ambulatory

## 2022-10-04 ENCOUNTER — TELEPHONE (OUTPATIENT)
Dept: NEPHROLOGY | Age: 67
End: 2022-10-04

## 2022-10-12 ENCOUNTER — OFFICE VISIT (OUTPATIENT)
Dept: FAMILY MEDICINE CLINIC | Age: 67
End: 2022-10-12
Payer: MEDICARE

## 2022-10-12 VITALS
DIASTOLIC BLOOD PRESSURE: 82 MMHG | SYSTOLIC BLOOD PRESSURE: 120 MMHG | HEIGHT: 60 IN | OXYGEN SATURATION: 98 % | HEART RATE: 73 BPM | RESPIRATION RATE: 16 BRPM | TEMPERATURE: 97.7 F | WEIGHT: 122 LBS | BODY MASS INDEX: 23.95 KG/M2

## 2022-10-12 DIAGNOSIS — F33.42 RECURRENT MAJOR DEPRESSIVE DISORDER, IN FULL REMISSION (HCC): Primary | ICD-10-CM

## 2022-10-12 DIAGNOSIS — F51.04 PSYCHOPHYSIOLOGICAL INSOMNIA: ICD-10-CM

## 2022-10-12 DIAGNOSIS — Z23 NEED FOR VACCINATION: ICD-10-CM

## 2022-10-12 DIAGNOSIS — F41.9 ANXIETY: ICD-10-CM

## 2022-10-12 DIAGNOSIS — N18.4 STAGE 4 CHRONIC KIDNEY DISEASE (HCC): ICD-10-CM

## 2022-10-12 PROBLEM — U07.1 COVID: Status: RESOLVED | Noted: 2022-08-11 | Resolved: 2022-10-12

## 2022-10-12 PROCEDURE — G8399 PT W/DXA RESULTS DOCUMENT: HCPCS | Performed by: FAMILY MEDICINE

## 2022-10-12 PROCEDURE — 90694 VACC AIIV4 NO PRSRV 0.5ML IM: CPT | Performed by: FAMILY MEDICINE

## 2022-10-12 PROCEDURE — G0009 ADMIN PNEUMOCOCCAL VACCINE: HCPCS | Performed by: FAMILY MEDICINE

## 2022-10-12 PROCEDURE — 90677 PCV20 VACCINE IM: CPT | Performed by: FAMILY MEDICINE

## 2022-10-12 PROCEDURE — 3017F COLORECTAL CA SCREEN DOC REV: CPT | Performed by: FAMILY MEDICINE

## 2022-10-12 PROCEDURE — G8427 DOCREV CUR MEDS BY ELIG CLIN: HCPCS | Performed by: FAMILY MEDICINE

## 2022-10-12 PROCEDURE — G0008 ADMIN INFLUENZA VIRUS VAC: HCPCS | Performed by: FAMILY MEDICINE

## 2022-10-12 PROCEDURE — 1036F TOBACCO NON-USER: CPT | Performed by: FAMILY MEDICINE

## 2022-10-12 PROCEDURE — G8484 FLU IMMUNIZE NO ADMIN: HCPCS | Performed by: FAMILY MEDICINE

## 2022-10-12 PROCEDURE — 99213 OFFICE O/P EST LOW 20 MIN: CPT | Performed by: FAMILY MEDICINE

## 2022-10-12 PROCEDURE — 1090F PRES/ABSN URINE INCON ASSESS: CPT | Performed by: FAMILY MEDICINE

## 2022-10-12 PROCEDURE — 1123F ACP DISCUSS/DSCN MKR DOCD: CPT | Performed by: FAMILY MEDICINE

## 2022-10-12 PROCEDURE — G8420 CALC BMI NORM PARAMETERS: HCPCS | Performed by: FAMILY MEDICINE

## 2022-10-12 RX ORDER — TRAZODONE HYDROCHLORIDE 50 MG/1
TABLET ORAL
Qty: 90 TABLET | Refills: 3 | Status: SHIPPED | OUTPATIENT
Start: 2022-10-12

## 2022-10-12 RX ORDER — BUPROPION HYDROCHLORIDE 200 MG/1
200 TABLET, EXTENDED RELEASE ORAL 2 TIMES DAILY
Qty: 180 TABLET | Refills: 3 | Status: SHIPPED | OUTPATIENT
Start: 2022-10-12

## 2022-10-12 NOTE — PROGRESS NOTES
Geovanni Herrera (:  1955) is a 69 y.o. female,Established patient, here for evaluation of the following chief complaint(s):  6 Month Follow-Up         ASSESSMENT/PLAN:  1. Recurrent major depressive disorder, in full remission ([facility]  -     buPROPion (WELLBUTRIN SR) 200 MG extended release tablet; Take 1 tablet by mouth 2 times daily, Disp-180 tablet, R-3Normal  -     traZODone (DESYREL) 50 MG tablet; TAKE 1 TABLET NIGHTLY, Disp-90 tablet, R-3PLEASE CONTACT PATIENT WHEN RX IS READY TO BE PICKED UP. Normal  2. Anxiety  -     buPROPion (WELLBUTRIN SR) 200 MG extended release tablet; Take 1 tablet by mouth 2 times daily, Disp-180 tablet, R-3Normal  -     traZODone (DESYREL) 50 MG tablet; TAKE 1 TABLET NIGHTLY, Disp-90 tablet, R-3PLEASE CONTACT PATIENT WHEN RX IS READY TO BE PICKED UP. Normal  3. Psychophysiological insomnia  -     traZODone (DESYREL) 50 MG tablet; TAKE 1 TABLET NIGHTLY, Disp-90 tablet, R-3PLEASE CONTACT PATIENT WHEN RX IS READY TO BE PICKED UP. Normal  4. Need for vaccination  -     Influenza, FLUAD, (age 72 y+), IM, Preservative Free, 0.5 mL  -     Pneumococcal, PCV20, PREVNAR 20, (age 25 yrs+), IM, PF  5. Stage 4 chronic kidney disease ([facility]    In a better place currently  Continue current plan    Return in about 6 months (around 2023) for AWV . Subjective   SUBJECTIVE/OBJECTIVE:  HPI    Better situation mentally. Medications working and counseling ongoing. Son seems to be better. Daughter back with . New puppies at home. Balance issues -- hit closet door with head but was small. Not using walker and cane today but will use when has to walk farther. She has h/o DM II in remote past. Was treated for 8 years but then did not need medication. However uncontrolled DM II mentioned by a previous doctor in  but A1c was 5.7 then. CKD a bit worse. Nephrology following closely. Sinus and AR doing okay so far. Review of Systems     No fever/chills. Objective   Physical Exam     Gen: NAD, AAO x 3, coherent, pleasant      CTAB. RRR. No edema. No results found for: EAG  Lab Results   Component Value Date    LABA1C 5.6 10/20/2020    LABA1C 5.6 01/15/2019    LABA1C 4.9 07/19/2018       Lab Results   Component Value Date     09/29/2022    K 4.7 09/29/2022     09/29/2022    CO2 27 09/29/2022    BUN 27 (H) 09/29/2022    CREATININE 1.8 (H) 09/29/2022    CALCIUM 9.1 09/29/2022    GLUCOSE 63 (L) 09/29/2022        Lab Results   Component Value Date    CHOL 174 01/21/2021    CHOL 187 01/15/2019    CHOL 189 01/16/2018     Lab Results   Component Value Date    TRIG 59 01/21/2021    TRIG 62 01/15/2019    TRIG 50 01/16/2018     Lab Results   Component Value Date     01/21/2021     01/15/2019     01/16/2018     Lab Results   Component Value Date    LDLCALC 58 01/21/2021    LDLCALC 63 01/15/2019    LDLCALC 43 01/16/2018     Lab Results   Component Value Date    LABVLDL 9 02/18/2016    LABVLDL 10 01/07/2015     Lab Results   Component Value Date    CHOLHDLRATIO 1.8 02/18/2016    CHOLHDLRATIO 2.1 01/07/2015       Lab Results   Component Value Date    LABMICR < 1.20 07/06/2020       Lab Results   Component Value Date    TSH 2.780 01/21/2021      Lab Results   Component Value Date    BWQJBWVA67 439 03/08/2021      Lab Results   Component Value Date    MG 1.9 07/12/2021      Lab Results   Component Value Date    ALT 24 07/13/2022    AST 24 07/13/2022    ALKPHOS 90 07/13/2022    BILITOT <0.2 (L) 07/13/2022    BILIDIR <0.2 07/13/2022        Lab Results   Component Value Date    WBC 6.5 07/13/2022    HGB 11.2 (L) 07/13/2022    HCT 35.5 (L) 07/13/2022    MCV 98.6 07/13/2022     07/13/2022     An electronic signature was used to authenticate this note.     --Stanton Cotta, MD

## 2022-10-12 NOTE — PROGRESS NOTES
Immunization(s) given during visit:    Immunizations Administered       Name Date Dose Route    Influenza, FLUAD, (age 72 y+), Adjuvanted, 0.5mL 10/12/2022 0.5 mL Intramuscular    Site: Deltoid- Left    Lot: [id number]    NDC: 16595-355-91    Pneumococcal conjugate PCV20, PF (Prevnar 20) 10/12/2022 0.5 mL Intramuscular    Site: Deltoid- Left    Lot: [id number]    NDC: 2418-8591-31          Verified by JOSE FRANCISCO DHALIWAL

## 2022-10-28 ENCOUNTER — OFFICE VISIT (OUTPATIENT)
Dept: CARDIOLOGY CLINIC | Age: 67
End: 2022-10-28
Payer: MEDICARE

## 2022-10-28 VITALS
DIASTOLIC BLOOD PRESSURE: 74 MMHG | HEART RATE: 76 BPM | HEIGHT: 60 IN | WEIGHT: 123 LBS | BODY MASS INDEX: 24.15 KG/M2 | SYSTOLIC BLOOD PRESSURE: 135 MMHG

## 2022-10-28 DIAGNOSIS — I10 ESSENTIAL HYPERTENSION: Primary | Chronic | ICD-10-CM

## 2022-10-28 DIAGNOSIS — R94.31 ABNORMAL EKG: ICD-10-CM

## 2022-10-28 DIAGNOSIS — R42 POSTURAL DIZZINESS: ICD-10-CM

## 2022-10-28 DIAGNOSIS — E78.00 PURE HYPERCHOLESTEROLEMIA: ICD-10-CM

## 2022-10-28 PROCEDURE — G8420 CALC BMI NORM PARAMETERS: HCPCS | Performed by: INTERNAL MEDICINE

## 2022-10-28 PROCEDURE — 3074F SYST BP LT 130 MM HG: CPT | Performed by: INTERNAL MEDICINE

## 2022-10-28 PROCEDURE — G8399 PT W/DXA RESULTS DOCUMENT: HCPCS | Performed by: INTERNAL MEDICINE

## 2022-10-28 PROCEDURE — G8484 FLU IMMUNIZE NO ADMIN: HCPCS | Performed by: INTERNAL MEDICINE

## 2022-10-28 PROCEDURE — 3078F DIAST BP <80 MM HG: CPT | Performed by: INTERNAL MEDICINE

## 2022-10-28 PROCEDURE — 1123F ACP DISCUSS/DSCN MKR DOCD: CPT | Performed by: INTERNAL MEDICINE

## 2022-10-28 PROCEDURE — 99214 OFFICE O/P EST MOD 30 MIN: CPT | Performed by: INTERNAL MEDICINE

## 2022-10-28 PROCEDURE — 1090F PRES/ABSN URINE INCON ASSESS: CPT | Performed by: INTERNAL MEDICINE

## 2022-10-28 PROCEDURE — 1036F TOBACCO NON-USER: CPT | Performed by: INTERNAL MEDICINE

## 2022-10-28 PROCEDURE — G8427 DOCREV CUR MEDS BY ELIG CLIN: HCPCS | Performed by: INTERNAL MEDICINE

## 2022-10-28 PROCEDURE — 3017F COLORECTAL CA SCREEN DOC REV: CPT | Performed by: INTERNAL MEDICINE
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
## 2022-12-12 ENCOUNTER — OFFICE VISIT (OUTPATIENT)
Dept: FAMILY MEDICINE CLINIC | Age: 67
End: 2022-12-12
Payer: MEDICARE

## 2022-12-12 VITALS
SYSTOLIC BLOOD PRESSURE: 128 MMHG | WEIGHT: 116 LBS | DIASTOLIC BLOOD PRESSURE: 70 MMHG | HEART RATE: 97 BPM | BODY MASS INDEX: 22.65 KG/M2 | OXYGEN SATURATION: 99 %

## 2022-12-12 DIAGNOSIS — B96.89 ACUTE BACTERIAL SINUSITIS: Primary | ICD-10-CM

## 2022-12-12 DIAGNOSIS — J01.90 ACUTE BACTERIAL SINUSITIS: Primary | ICD-10-CM

## 2022-12-12 PROBLEM — F41.1 GENERALIZED ANXIETY DISORDER: Chronic | Status: ACTIVE | Noted: 2019-01-14

## 2022-12-12 PROBLEM — M51.36 DEGENERATION OF LUMBAR INTERVERTEBRAL DISC: Status: ACTIVE | Noted: 2022-12-12

## 2022-12-12 PROBLEM — M51.369 DEGENERATION OF LUMBAR INTERVERTEBRAL DISC: Status: ACTIVE | Noted: 2022-12-12

## 2022-12-12 PROBLEM — M54.17 LUMBOSACRAL RADICULOPATHY: Status: ACTIVE | Noted: 2022-12-12

## 2022-12-12 PROCEDURE — 3017F COLORECTAL CA SCREEN DOC REV: CPT | Performed by: NURSE PRACTITIONER

## 2022-12-12 PROCEDURE — G8399 PT W/DXA RESULTS DOCUMENT: HCPCS | Performed by: NURSE PRACTITIONER

## 2022-12-12 PROCEDURE — G8420 CALC BMI NORM PARAMETERS: HCPCS | Performed by: NURSE PRACTITIONER

## 2022-12-12 PROCEDURE — 1036F TOBACCO NON-USER: CPT | Performed by: NURSE PRACTITIONER

## 2022-12-12 PROCEDURE — G8427 DOCREV CUR MEDS BY ELIG CLIN: HCPCS | Performed by: NURSE PRACTITIONER

## 2022-12-12 PROCEDURE — 1090F PRES/ABSN URINE INCON ASSESS: CPT | Performed by: NURSE PRACTITIONER

## 2022-12-12 PROCEDURE — 3078F DIAST BP <80 MM HG: CPT | Performed by: NURSE PRACTITIONER

## 2022-12-12 PROCEDURE — G8484 FLU IMMUNIZE NO ADMIN: HCPCS | Performed by: NURSE PRACTITIONER

## 2022-12-12 PROCEDURE — 1123F ACP DISCUSS/DSCN MKR DOCD: CPT | Performed by: NURSE PRACTITIONER

## 2022-12-12 PROCEDURE — 99213 OFFICE O/P EST LOW 20 MIN: CPT | Performed by: NURSE PRACTITIONER

## 2022-12-12 PROCEDURE — 3074F SYST BP LT 130 MM HG: CPT | Performed by: NURSE PRACTITIONER

## 2022-12-12 RX ORDER — DOXYCYCLINE HYCLATE 100 MG/1
100 CAPSULE ORAL 2 TIMES DAILY
Qty: 20 CAPSULE | Refills: 0 | Status: ON HOLD | OUTPATIENT
Start: 2022-12-12 | End: 2022-12-22

## 2022-12-12 ASSESSMENT — ENCOUNTER SYMPTOMS
DIARRHEA: 0
COUGH: 1
SHORTNESS OF BREATH: 0
VOMITING: 0
SORE THROAT: 1
NAUSEA: 1

## 2022-12-12 NOTE — PROGRESS NOTES
Feliz Erickson (:  1955) is a 69 y.o. female,Established patient, here for evaluation of the following chief complaint(s):  Fatigue (X1 week, unbalanced, congestion )         ASSESSMENT/PLAN:  1. Acute bacterial sinusitis  - Acute  - Start doxycyline for suspected bacterial sinusitis  - OTC treatments as needed for symptomatic relief  - Supportive treatments encouraged- rest, fluids and bland diet as able. -     doxycycline hyclate (VIBRAMYCIN) 100 MG capsule; Take 1 capsule by mouth 2 times daily for 10 days, Disp-20 capsule, R-0Normal    Return if symptoms worsen or fail to improve. Subjective   SUBJECTIVE/OBJECTIVE:  Fatigue  This is a new problem. The current episode started 1 to 4 weeks ago. The problem has been gradually worsening. Associated symptoms include congestion, coughing (productive-green), fatigue, nausea, a sore throat and weakness. Pertinent negatives include no fever or vomiting. Treatments tried: vicks, nasal saline, increased fluid intake. The treatment provided mild relief. Review of Systems   Constitutional:  Positive for fatigue. Negative for fever. HENT:  Positive for congestion, postnasal drip and sore throat. Respiratory:  Positive for cough (productive-green). Negative for shortness of breath. Gastrointestinal:  Positive for nausea. Negative for diarrhea and vomiting. Genitourinary:  Negative for dysuria, frequency and urgency. Dark urine but is lightening up   Neurological:  Positive for weakness and light-headedness. Objective   Physical Exam  Vitals and nursing note reviewed. Constitutional:       General: She is awake. Appearance: Normal appearance. HENT:      Head: Normocephalic and atraumatic. Right Ear: Hearing and external ear normal.      Left Ear: Hearing and external ear normal.      Nose: Nose normal. No congestion or rhinorrhea. Right Sinus: No maxillary sinus tenderness or frontal sinus tenderness.       Left Sinus: No maxillary sinus tenderness or frontal sinus tenderness. Eyes:      General: Lids are normal.         Right eye: No discharge. Left eye: No discharge. Conjunctiva/sclera: Conjunctivae normal.   Neck:      Trachea: No tracheal deviation. Cardiovascular:      Rate and Rhythm: Normal rate and regular rhythm. Heart sounds: Normal heart sounds. No murmur heard. Pulmonary:      Effort: Pulmonary effort is normal. No respiratory distress. Breath sounds: No stridor. No wheezing. Musculoskeletal:      Cervical back: Full passive range of motion without pain. Skin:     General: Skin is dry. Coloration: Skin is not jaundiced or pale. Neurological:      General: No focal deficit present. Mental Status: She is alert. Mental status is at baseline. Comments: Using a walker. Psychiatric:         Mood and Affect: Mood and affect normal.         Behavior: Behavior is cooperative. An electronic signature was used to authenticate this note.     --Carle Seip, CARLOS MANUEL - CNP

## 2022-12-15 DIAGNOSIS — J30.1 ALLERGIC RHINITIS DUE TO POLLEN, UNSPECIFIED SEASONALITY: ICD-10-CM

## 2022-12-15 RX ORDER — MONTELUKAST SODIUM 10 MG/1
10 TABLET ORAL DAILY
Qty: 30 TABLET | Refills: 5 | Status: ON HOLD | OUTPATIENT
Start: 2022-12-15

## 2022-12-15 NOTE — TELEPHONE ENCOUNTER
called and Odilon Treadwell needs a refill on [facility] called into [facility] on [facility].

## 2022-12-15 NOTE — TELEPHONE ENCOUNTER
Rio Lira called requesting a refill on the following medications:  Requested Prescriptions     Pending Prescriptions Disp Refills    montelukast (SINGULAIR) 10 MG tablet 30 tablet 5     Sig: Take 1 tablet by mouth daily       Date of last visit: 12/12/2022  Date of next visit (if applicable):4/13/2023  Date of last refill: 5-27-22  Ph[facility]: [facility]     Rx pending  # 30/5      Joao Jacobs, DOROTHY

## 2022-12-16 ENCOUNTER — APPOINTMENT (OUTPATIENT)
Dept: GENERAL RADIOLOGY | Age: 67
End: 2022-12-16
Payer: MEDICARE

## 2022-12-16 ENCOUNTER — HOSPITAL ENCOUNTER (INPATIENT)
Age: 67
LOS: 14 days | Discharge: HOME | End: 2022-12-30
Attending: EMERGENCY MEDICINE
Payer: MEDICARE

## 2022-12-16 ENCOUNTER — OFFICE VISIT (OUTPATIENT)
Dept: FAMILY MEDICINE CLINIC | Age: 67
End: 2022-12-16

## 2022-12-16 VITALS
DIASTOLIC BLOOD PRESSURE: 88 MMHG | WEIGHT: 114 LBS | HEART RATE: 98 BPM | TEMPERATURE: 97.1 F | RESPIRATION RATE: 18 BRPM | HEIGHT: 60 IN | BODY MASS INDEX: 22.38 KG/M2 | SYSTOLIC BLOOD PRESSURE: 126 MMHG

## 2022-12-16 DIAGNOSIS — R11.2 NAUSEA AND VOMITING, UNSPECIFIED VOMITING TYPE: ICD-10-CM

## 2022-12-16 DIAGNOSIS — F41.9 ANXIETY: ICD-10-CM

## 2022-12-16 DIAGNOSIS — N18.9 ACUTE RENAL FAILURE SUPERIMPOSED ON CHRONIC KIDNEY DISEASE, UNSPECIFIED CKD STAGE, UNSPECIFIED ACUTE RENAL FAILURE TYPE (HCC): ICD-10-CM

## 2022-12-16 DIAGNOSIS — E86.0 DEHYDRATION: ICD-10-CM

## 2022-12-16 DIAGNOSIS — F51.04 PSYCHOPHYSIOLOGICAL INSOMNIA: ICD-10-CM

## 2022-12-16 DIAGNOSIS — N18.4 STAGE 4 CHRONIC KIDNEY DISEASE (HCC): Primary | ICD-10-CM

## 2022-12-16 DIAGNOSIS — F33.42 RECURRENT MAJOR DEPRESSIVE DISORDER, IN FULL REMISSION (HCC): ICD-10-CM

## 2022-12-16 DIAGNOSIS — N17.9 ACUTE RENAL FAILURE SUPERIMPOSED ON CHRONIC KIDNEY DISEASE, UNSPECIFIED CKD STAGE, UNSPECIFIED ACUTE RENAL FAILURE TYPE (HCC): ICD-10-CM

## 2022-12-16 DIAGNOSIS — R34 OLIGURIA: ICD-10-CM

## 2022-12-16 DIAGNOSIS — E86.0 DEHYDRATION: Primary | ICD-10-CM

## 2022-12-16 PROBLEM — E87.20 METABOLIC ACIDOSIS: Status: ACTIVE | Noted: 2022-12-16

## 2022-12-16 LAB
ALBUMIN SERPL-MCNC: 4.3 G/DL (ref 3.5–5.1)
ALP BLD-CCNC: 98 U/L (ref 38–126)
ALT SERPL-CCNC: 15 U/L (ref 11–66)
ANION GAP SERPL CALCULATED.3IONS-SCNC: 27 MEQ/L (ref 8–16)
AST SERPL-CCNC: 21 U/L (ref 5–40)
BACTERIA: ABNORMAL
BASOPHILS # BLD: 0.7 %
BASOPHILS ABSOLUTE: 0.1 THOU/MM3 (ref 0–0.1)
BILIRUB SERPL-MCNC: 0.4 MG/DL (ref 0.3–1.2)
BILIRUBIN URINE: NEGATIVE
BLOOD, URINE: ABNORMAL
BUN BLDV-MCNC: 96 MG/DL (ref 7–22)
CALCIUM IONIZED: 0.89 MMOL/L (ref 1.12–1.32)
CALCIUM SERPL-MCNC: 6.6 MG/DL (ref 8.5–10.5)
CASTS: ABNORMAL /LPF
CASTS: ABNORMAL /LPF
CHARACTER, URINE: CLEAR
CHLORIDE BLD-SCNC: 103 MEQ/L (ref 98–111)
CO2: 13 MEQ/L (ref 23–33)
COLOR: YELLOW
CREAT SERPL-MCNC: 8.2 MG/DL (ref 0.4–1.2)
CREATININE URINE: 53 MG/DL
CRYSTALS: ABNORMAL
EOSINOPHIL # BLD: 0 %
EOSINOPHIL SMEAR, URINE: NORMAL
EOSINOPHILS ABSOLUTE: 0 THOU/MM3 (ref 0–0.4)
EPITHELIAL CELLS, UA: ABNORMAL /HPF
ERYTHROCYTE [DISTWIDTH] IN BLOOD BY AUTOMATED COUNT: 13.1 % (ref 11.5–14.5)
ERYTHROCYTE [DISTWIDTH] IN BLOOD BY AUTOMATED COUNT: 43.5 FL (ref 35–45)
GFR SERPL CREATININE-BSD FRML MDRD: 5 ML/MIN/1.73M2
GLUCOSE BLD-MCNC: 99 MG/DL (ref 70–108)
GLUCOSE, URINE: NEGATIVE MG/DL
GROUP A STREP CULTURE, REFLEX: NEGATIVE
HCT VFR BLD CALC: 30.6 % (ref 37–47)
HEMOGLOBIN: 10.3 GM/DL (ref 12–16)
IMMATURE GRANS (ABS): 0.1 THOU/MM3 (ref 0–0.07)
IMMATURE GRANULOCYTES: 1.2 %
INFLUENZA A: NOT DETECTED
INFLUENZA B: NOT DETECTED
KETONES, URINE: ABNORMAL
LACTIC ACID: 0.8 MMOL/L (ref 0.5–2)
LEUKOCYTE ESTERASE, URINE: ABNORMAL
LIPASE: 48.9 U/L (ref 5.6–51.3)
LYMPHOCYTES # BLD: 16.5 %
LYMPHOCYTES ABSOLUTE: 1.4 THOU/MM3 (ref 1–4.8)
MAGNESIUM: 1.7 MG/DL (ref 1.6–2.4)
MCH RBC QN AUTO: 30.8 PG (ref 26–33)
MCHC RBC AUTO-ENTMCNC: 33.7 GM/DL (ref 32.2–35.5)
MCV RBC AUTO: 91.6 FL (ref 81–99)
MISCELLANEOUS LAB TEST RESULT: ABNORMAL
MONOCYTES # BLD: 4.4 %
MONOCYTES ABSOLUTE: 0.4 THOU/MM3 (ref 0.4–1.3)
NITRITE, URINE: NEGATIVE
NUCLEATED RED BLOOD CELLS: 0 /100 WBC
OSMOLALITY CALCULATION: 314.8 MOSMOL/KG (ref 275–300)
OSMOLALITY URINE: 329 MOSMOL/KG (ref 250–750)
PH UA: 5 (ref 5–9)
PLATELET # BLD: 168 THOU/MM3 (ref 130–400)
PMV BLD AUTO: 10.3 FL (ref 9.4–12.4)
POTASSIUM SERPL-SCNC: 3.8 MEQ/L (ref 3.5–5.2)
PROTEIN UA: 30 MG/DL
RBC # BLD: 3.34 MILL/MM3 (ref 4.2–5.4)
RBC URINE: ABNORMAL /HPF
REFLEX THROAT C + S: NORMAL
RENAL EPITHELIAL, UA: ABNORMAL
SARS-COV-2 RNA, RT PCR: NOT DETECTED
SEG NEUTROPHILS: 77.2 %
SEGMENTED NEUTROPHILS ABSOLUTE COUNT: 6.4 THOU/MM3 (ref 1.8–7.7)
SODIUM BLD-SCNC: 143 MEQ/L (ref 135–145)
SODIUM URINE: 64 MEQ/L
SPECIFIC GRAVITY UA: 1.01 (ref 1–1.03)
TOTAL PROTEIN: 7.6 G/DL (ref 6.1–8)
TROPONIN T: 0.03 NG/ML
UROBILINOGEN, URINE: 0.2 EU/DL (ref 0–1)
WBC # BLD: 8.3 THOU/MM3 (ref 4.8–10.8)
WBC UA: ABNORMAL /HPF
YEAST: ABNORMAL

## 2022-12-16 PROCEDURE — 99285 EMERGENCY DEPT VISIT HI MDM: CPT

## 2022-12-16 PROCEDURE — 6370000000 HC RX 637 (ALT 250 FOR IP): Performed by: STUDENT IN AN ORGANIZED HEALTH CARE EDUCATION/TRAINING PROGRAM

## 2022-12-16 PROCEDURE — 84484 ASSAY OF TROPONIN QUANT: CPT

## 2022-12-16 PROCEDURE — 96361 HYDRATE IV INFUSION ADD-ON: CPT

## 2022-12-16 PROCEDURE — 6360000002 HC RX W HCPCS: Performed by: STUDENT IN AN ORGANIZED HEALTH CARE EDUCATION/TRAINING PROGRAM

## 2022-12-16 PROCEDURE — 87880 STREP A ASSAY W/OPTIC: CPT

## 2022-12-16 PROCEDURE — 2580000003 HC RX 258: Performed by: INTERNAL MEDICINE

## 2022-12-16 PROCEDURE — 82570 ASSAY OF URINE CREATININE: CPT

## 2022-12-16 PROCEDURE — 83605 ASSAY OF LACTIC ACID: CPT

## 2022-12-16 PROCEDURE — 99223 1ST HOSP IP/OBS HIGH 75: CPT | Performed by: PHYSICIAN ASSISTANT

## 2022-12-16 PROCEDURE — 6360000002 HC RX W HCPCS: Performed by: INTERNAL MEDICINE

## 2022-12-16 PROCEDURE — 83735 ASSAY OF MAGNESIUM: CPT

## 2022-12-16 PROCEDURE — 99223 1ST HOSP IP/OBS HIGH 75: CPT | Performed by: INTERNAL MEDICINE

## 2022-12-16 PROCEDURE — 93005 ELECTROCARDIOGRAM TRACING: CPT | Performed by: STUDENT IN AN ORGANIZED HEALTH CARE EDUCATION/TRAINING PROGRAM

## 2022-12-16 PROCEDURE — 2580000003 HC RX 258: Performed by: PHYSICIAN ASSISTANT

## 2022-12-16 PROCEDURE — 2580000003 HC RX 258: Performed by: EMERGENCY MEDICINE

## 2022-12-16 PROCEDURE — 1200000003 HC TELEMETRY R&B

## 2022-12-16 PROCEDURE — 81001 URINALYSIS AUTO W/SCOPE: CPT

## 2022-12-16 PROCEDURE — 2580000003 HC RX 258: Performed by: STUDENT IN AN ORGANIZED HEALTH CARE EDUCATION/TRAINING PROGRAM

## 2022-12-16 PROCEDURE — 82330 ASSAY OF CALCIUM: CPT

## 2022-12-16 PROCEDURE — 2500000003 HC RX 250 WO HCPCS: Performed by: INTERNAL MEDICINE

## 2022-12-16 PROCEDURE — 83690 ASSAY OF LIPASE: CPT

## 2022-12-16 PROCEDURE — 87070 CULTURE OTHR SPECIMN AEROBIC: CPT

## 2022-12-16 PROCEDURE — 80053 COMPREHEN METABOLIC PANEL: CPT

## 2022-12-16 PROCEDURE — 84300 ASSAY OF URINE SODIUM: CPT

## 2022-12-16 PROCEDURE — 6360000002 HC RX W HCPCS: Performed by: PHYSICIAN ASSISTANT

## 2022-12-16 PROCEDURE — 96374 THER/PROPH/DIAG INJ IV PUSH: CPT

## 2022-12-16 PROCEDURE — 71046 X-RAY EXAM CHEST 2 VIEWS: CPT

## 2022-12-16 PROCEDURE — 83935 ASSAY OF URINE OSMOLALITY: CPT

## 2022-12-16 PROCEDURE — 87636 SARSCOV2 & INF A&B AMP PRB: CPT

## 2022-12-16 PROCEDURE — 89190 NASAL SMEAR FOR EOSINOPHILS: CPT

## 2022-12-16 PROCEDURE — 6370000000 HC RX 637 (ALT 250 FOR IP): Performed by: PHYSICIAN ASSISTANT

## 2022-12-16 PROCEDURE — 85025 COMPLETE CBC W/AUTO DIFF WBC: CPT

## 2022-12-16 PROCEDURE — 36415 COLL VENOUS BLD VENIPUNCTURE: CPT

## 2022-12-16 RX ORDER — SODIUM CHLORIDE 0.9 % (FLUSH) 0.9 %
10 SYRINGE (ML) INJECTION PRN
Status: DISCONTINUED | OUTPATIENT
Start: 2022-12-16 | End: 2022-12-30 | Stop reason: HOSPADM

## 2022-12-16 RX ORDER — ONDANSETRON 2 MG/ML
4 INJECTION INTRAMUSCULAR; INTRAVENOUS EVERY 6 HOURS PRN
Status: DISCONTINUED | OUTPATIENT
Start: 2022-12-16 | End: 2022-12-30 | Stop reason: HOSPADM

## 2022-12-16 RX ORDER — CLOPIDOGREL BISULFATE 75 MG/1
75 TABLET ORAL DAILY
Status: DISCONTINUED | OUTPATIENT
Start: 2022-12-17 | End: 2022-12-30 | Stop reason: HOSPADM

## 2022-12-16 RX ORDER — SIMETHICONE 80 MG
80 TABLET,CHEWABLE ORAL EVERY 6 HOURS PRN
Status: DISCONTINUED | OUTPATIENT
Start: 2022-12-16 | End: 2022-12-30 | Stop reason: HOSPADM

## 2022-12-16 RX ORDER — TRAZODONE HYDROCHLORIDE 50 MG/1
50 TABLET ORAL NIGHTLY
Status: DISCONTINUED | OUTPATIENT
Start: 2022-12-16 | End: 2022-12-30 | Stop reason: HOSPADM

## 2022-12-16 RX ORDER — DIVALPROEX SODIUM 250 MG/1
1 TABLET, EXTENDED RELEASE ORAL DAILY
Status: DISCONTINUED | OUTPATIENT
Start: 2022-12-16 | End: 2022-12-16

## 2022-12-16 RX ORDER — SODIUM CHLORIDE 9 MG/ML
INJECTION, SOLUTION INTRAVENOUS PRN
Status: DISCONTINUED | OUTPATIENT
Start: 2022-12-16 | End: 2022-12-30 | Stop reason: HOSPADM

## 2022-12-16 RX ORDER — CALCIUM GLUCONATE 20 MG/ML
2000 INJECTION, SOLUTION INTRAVENOUS ONCE
Status: COMPLETED | OUTPATIENT
Start: 2022-12-16 | End: 2022-12-16

## 2022-12-16 RX ORDER — ONDANSETRON 2 MG/ML
4 INJECTION INTRAMUSCULAR; INTRAVENOUS ONCE
Status: COMPLETED | OUTPATIENT
Start: 2022-12-16 | End: 2022-12-16

## 2022-12-16 RX ORDER — ACETAMINOPHEN 500 MG
1000 TABLET ORAL ONCE
Status: COMPLETED | OUTPATIENT
Start: 2022-12-16 | End: 2022-12-16

## 2022-12-16 RX ORDER — CETIRIZINE HYDROCHLORIDE 5 MG/1
5 TABLET ORAL NIGHTLY PRN
Status: DISCONTINUED | OUTPATIENT
Start: 2022-12-16 | End: 2022-12-30 | Stop reason: HOSPADM

## 2022-12-16 RX ORDER — CETIRIZINE HYDROCHLORIDE 10 MG/1
10 TABLET ORAL DAILY PRN
Status: ON HOLD | COMMUNITY
End: 2022-12-30 | Stop reason: HOSPADM

## 2022-12-16 RX ORDER — CETIRIZINE HYDROCHLORIDE 10 MG/1
10 TABLET ORAL NIGHTLY PRN
Status: DISCONTINUED | OUTPATIENT
Start: 2022-12-16 | End: 2022-12-16

## 2022-12-16 RX ORDER — POLYETHYLENE GLYCOL 3350 17 G/17G
17 POWDER, FOR SOLUTION ORAL DAILY
Status: DISCONTINUED | OUTPATIENT
Start: 2022-12-16 | End: 2022-12-30 | Stop reason: HOSPADM

## 2022-12-16 RX ORDER — ASPIRIN 81 MG/1
81 TABLET ORAL DAILY
Status: DISCONTINUED | OUTPATIENT
Start: 2022-12-16 | End: 2022-12-30 | Stop reason: HOSPADM

## 2022-12-16 RX ORDER — SENNA PLUS 8.6 MG/1
1 TABLET ORAL DAILY PRN
Status: DISCONTINUED | OUTPATIENT
Start: 2022-12-16 | End: 2022-12-17

## 2022-12-16 RX ORDER — DOCUSATE SODIUM 100 MG/1
100 CAPSULE, LIQUID FILLED ORAL DAILY PRN
COMMUNITY

## 2022-12-16 RX ORDER — 0.9 % SODIUM CHLORIDE 0.9 %
500 INTRAVENOUS SOLUTION INTRAVENOUS ONCE
Status: COMPLETED | OUTPATIENT
Start: 2022-12-16 | End: 2022-12-16

## 2022-12-16 RX ORDER — MONTELUKAST SODIUM 10 MG/1
10 TABLET ORAL DAILY
Status: DISCONTINUED | OUTPATIENT
Start: 2022-12-16 | End: 2022-12-30 | Stop reason: HOSPADM

## 2022-12-16 RX ORDER — ONDANSETRON 4 MG/1
4 TABLET, ORALLY DISINTEGRATING ORAL EVERY 8 HOURS PRN
Status: DISCONTINUED | OUTPATIENT
Start: 2022-12-16 | End: 2022-12-30 | Stop reason: HOSPADM

## 2022-12-16 RX ORDER — BUPROPION HYDROCHLORIDE 100 MG/1
200 TABLET, EXTENDED RELEASE ORAL 2 TIMES DAILY
Status: DISCONTINUED | OUTPATIENT
Start: 2022-12-16 | End: 2022-12-30 | Stop reason: HOSPADM

## 2022-12-16 RX ORDER — TRAZODONE HYDROCHLORIDE 50 MG/1
TABLET ORAL
Qty: 90 TABLET | Refills: 3 | Status: ON HOLD | OUTPATIENT
Start: 2022-12-16

## 2022-12-16 RX ORDER — ACETAMINOPHEN 650 MG/1
650 SUPPOSITORY RECTAL EVERY 6 HOURS PRN
Status: DISCONTINUED | OUTPATIENT
Start: 2022-12-16 | End: 2022-12-30 | Stop reason: HOSPADM

## 2022-12-16 RX ORDER — HEPARIN SODIUM 5000 [USP'U]/ML
5000 INJECTION, SOLUTION INTRAVENOUS; SUBCUTANEOUS EVERY 8 HOURS SCHEDULED
Status: DISCONTINUED | OUTPATIENT
Start: 2022-12-16 | End: 2022-12-30 | Stop reason: HOSPADM

## 2022-12-16 RX ORDER — SODIUM CHLORIDE 9 MG/ML
INJECTION, SOLUTION INTRAVENOUS CONTINUOUS
Status: DISCONTINUED | OUTPATIENT
Start: 2022-12-16 | End: 2022-12-16

## 2022-12-16 RX ORDER — HYDRALAZINE HYDROCHLORIDE 20 MG/ML
5 INJECTION INTRAMUSCULAR; INTRAVENOUS EVERY 6 HOURS PRN
Status: DISCONTINUED | OUTPATIENT
Start: 2022-12-16 | End: 2022-12-30 | Stop reason: HOSPADM

## 2022-12-16 RX ORDER — ATORVASTATIN CALCIUM 10 MG/1
10 TABLET, FILM COATED ORAL DAILY
Status: DISCONTINUED | OUTPATIENT
Start: 2022-12-17 | End: 2022-12-30 | Stop reason: HOSPADM

## 2022-12-16 RX ORDER — SODIUM CHLORIDE 0.9 % (FLUSH) 0.9 %
10 SYRINGE (ML) INJECTION EVERY 12 HOURS SCHEDULED
Status: DISCONTINUED | OUTPATIENT
Start: 2022-12-16 | End: 2022-12-30 | Stop reason: HOSPADM

## 2022-12-16 RX ORDER — FLUTICASONE PROPIONATE 50 MCG
1 SPRAY, SUSPENSION (ML) NASAL DAILY PRN
Status: DISCONTINUED | OUTPATIENT
Start: 2022-12-16 | End: 2022-12-30 | Stop reason: HOSPADM

## 2022-12-16 RX ORDER — CHLORDIAZEPOXIDE HYDROCHLORIDE AND CLIDINIUM BROMIDE 5; 2.5 MG/1; MG/1
1 CAPSULE ORAL 2 TIMES DAILY PRN
Status: DISCONTINUED | OUTPATIENT
Start: 2022-12-16 | End: 2022-12-30 | Stop reason: HOSPADM

## 2022-12-16 RX ORDER — FOLIC ACID 1 MG/1
500 TABLET ORAL DAILY
Status: DISCONTINUED | OUTPATIENT
Start: 2022-12-17 | End: 2022-12-30 | Stop reason: HOSPADM

## 2022-12-16 RX ORDER — 0.9 % SODIUM CHLORIDE 0.9 %
1000 INTRAVENOUS SOLUTION INTRAVENOUS ONCE
Status: COMPLETED | OUTPATIENT
Start: 2022-12-16 | End: 2022-12-16

## 2022-12-16 RX ORDER — ACETAMINOPHEN 325 MG/1
650 TABLET ORAL EVERY 6 HOURS PRN
Status: DISCONTINUED | OUTPATIENT
Start: 2022-12-16 | End: 2022-12-30 | Stop reason: HOSPADM

## 2022-12-16 RX ORDER — AMLODIPINE BESYLATE 5 MG/1
5 TABLET ORAL DAILY
Status: DISCONTINUED | OUTPATIENT
Start: 2022-12-17 | End: 2022-12-30 | Stop reason: HOSPADM

## 2022-12-16 RX ADMIN — CALCIUM GLUCONATE 2000 MG: 20 INJECTION, SOLUTION INTRAVENOUS at 15:32

## 2022-12-16 RX ADMIN — SODIUM CHLORIDE 500 ML: 9 INJECTION, SOLUTION INTRAVENOUS at 11:31

## 2022-12-16 RX ADMIN — BUPROPION HYDROCHLORIDE 200 MG: 100 TABLET, FILM COATED, EXTENDED RELEASE ORAL at 22:48

## 2022-12-16 RX ADMIN — HYDRALAZINE HYDROCHLORIDE 5 MG: 20 INJECTION INTRAMUSCULAR; INTRAVENOUS at 16:13

## 2022-12-16 RX ADMIN — ACETAMINOPHEN 1000 MG: 500 TABLET ORAL at 11:32

## 2022-12-16 RX ADMIN — SODIUM CHLORIDE 1000 ML: 9 INJECTION, SOLUTION INTRAVENOUS at 12:37

## 2022-12-16 RX ADMIN — MONTELUKAST SODIUM 10 MG: 10 TABLET ORAL at 22:48

## 2022-12-16 RX ADMIN — SODIUM CHLORIDE: 9 INJECTION, SOLUTION INTRAVENOUS at 14:12

## 2022-12-16 RX ADMIN — SODIUM BICARBONATE: 84 INJECTION, SOLUTION INTRAVENOUS at 17:19

## 2022-12-16 RX ADMIN — ONDANSETRON 4 MG: 2 INJECTION INTRAMUSCULAR; INTRAVENOUS at 11:32

## 2022-12-16 RX ADMIN — HEPARIN SODIUM 5000 UNITS: 5000 INJECTION INTRAVENOUS; SUBCUTANEOUS at 22:47

## 2022-12-16 RX ADMIN — TRAZODONE HYDROCHLORIDE 50 MG: 50 TABLET ORAL at 22:48

## 2022-12-16 RX ADMIN — SODIUM CHLORIDE, PRESERVATIVE FREE 10 ML: 5 INJECTION INTRAVENOUS at 22:47

## 2022-12-16 RX ADMIN — ASPIRIN 81 MG: 81 TABLET, COATED ORAL at 22:48

## 2022-12-16 ASSESSMENT — ENCOUNTER SYMPTOMS
EYES NEGATIVE: 1
SHORTNESS OF BREATH: 1
WHEEZING: 0
TROUBLE SWALLOWING: 0
COUGH: 1
EYE PAIN: 0
COUGH: 1
SORE THROAT: 0
DIARRHEA: 0
VOMITING: 1
VOMITING: 0
VOICE CHANGE: 0
SORE THROAT: 1
COLOR CHANGE: 0
SHORTNESS OF BREATH: 0
SINUS PAIN: 1
BACK PAIN: 0
STRIDOR: 0
ABDOMINAL PAIN: 0
PHOTOPHOBIA: 0
NAUSEA: 1
CHEST TIGHTNESS: 0
COUGH: 0
SINUS PRESSURE: 1
RHINORRHEA: 1
SINUS PRESSURE: 1

## 2022-12-16 ASSESSMENT — PAIN - FUNCTIONAL ASSESSMENT
PAIN_FUNCTIONAL_ASSESSMENT: 0-10

## 2022-12-16 ASSESSMENT — PAIN DESCRIPTION - DESCRIPTORS
DESCRIPTORS: ACHING

## 2022-12-16 ASSESSMENT — PAIN SCALES - GENERAL
PAINLEVEL_OUTOF10: 3
PAINLEVEL_OUTOF10: 5
PAINLEVEL_OUTOF10: 3
PAINLEVEL_OUTOF10: 4
PAINLEVEL_OUTOF10: 2
PAINLEVEL_OUTOF10: 4

## 2022-12-16 ASSESSMENT — PAIN DESCRIPTION - PAIN TYPE
TYPE: ACUTE PAIN

## 2022-12-16 ASSESSMENT — PAIN DESCRIPTION - LOCATION
LOCATION: ABDOMEN

## 2022-12-16 ASSESSMENT — PAIN DESCRIPTION - ORIENTATION
ORIENTATION: LOWER

## 2022-12-16 NOTE — PROGRESS NOTES
Pharmacy Medication History Note      List of current medications patient is taking is complete. Source of information: patient, Surescripts fill report    Changes made to medication list:  Medications removed (include reason, ex. therapy complete or physician discontinued):  Divalproex - no record of fill since 2021    Medications added/doses adjusted:  Clarified Miralax as daily  Added docusate 100mg daily prn constipation  Clarified Linzess as prn  Clarified Zyrtec as prn    Other notes (ex. Recent course of antibiotics, Coumadin dosing):  Patient filled RX for doxycycline on 12/12/22. Patient states she was told today in ER to stop taking it. Denies use of other OTC or herbal medications.         Electronically signed by FEMI Jackson Do [facility] on 12/16/2022 at 2:49 PM

## 2022-12-16 NOTE — PROGRESS NOTES
Alen Webster (:  1955) is a 69 y.o. female,Established patient, here for evaluation of the following chief complaint(s):  Urinary Retention (/), Anorexia, and Nausea         ASSESSMENT/PLAN:  1. Stage 4 chronic kidney disease ([facility] [address]  2. Recurrent major depressive disorder, in full remission ([facility] [address]  -     traZODone (DESYREL) 50 MG tablet; TAKE 1 TABLET NIGHTLY, Disp-90 tablet, R-3PLEASE CONTACT PATIENT WHEN RX IS READY TO BE PICKED UP. Normal  3. Anxiety  -     traZODone (DESYREL) 50 MG tablet; TAKE 1 TABLET NIGHTLY, Disp-90 tablet, R-3PLEASE CONTACT PATIENT WHEN RX IS READY TO BE PICKED UP. Normal  4. Psychophysiological insomnia  -     traZODone (DESYREL) 50 MG tablet; TAKE 1 TABLET NIGHTLY, Disp-90 tablet, R-3PLEASE CONTACT PATIENT WHEN RX IS READY TO BE PICKED UP. Normal  5. Oliguria  6. Dehydration      Return if symptoms worsen or fail to improve, for transfer to ER. Patient appears dehydrated. I believe her decreased urine OP primarily caused by worsening kidney failure brought on by dehydration. I will have her  take her to the ER for further evaluation and treatment. They are agreeable to this and she was discharged in stable condition. Subjective   SUBJECTIVE/OBJECTIVE:  HPI  Emily Stokes presents for evaluaton of decreased urine OP. She notes her last urine OP was last evening where she only dribbled. She is complaining of nausea and vomiting. She has not eaten for 2 days. She is only able to eat small amounts. She was treated Monday for sinusitis. Review of Systems   Constitutional:  Positive for activity change, appetite change and fatigue. HENT:  Positive for congestion, postnasal drip and sinus pressure. Respiratory:  Positive for cough. Genitourinary:  Positive for decreased urine volume and difficulty urinating. Negative for dysuria, flank pain, frequency and hematuria. Musculoskeletal: Negative. Neurological:  Positive for headaches.         Objective   Physical Exam  Vitals and nursing note reviewed. Constitutional:       General: She is not in acute distress. Appearance: She is well-developed. HENT:      Head: Normocephalic and atraumatic. Right Ear: External ear normal.      Left Ear: External ear normal.      Nose: Nose normal.      Mouth/Throat:      Mouth: Mucous membranes are dry. Eyes:      Conjunctiva/sclera: Conjunctivae normal.      Pupils: Pupils are equal, round, and reactive to light. Neck:      Thyroid: No thyromegaly. Vascular: No JVD. Trachea: No tracheal deviation. Cardiovascular:      Rate and Rhythm: Normal rate and regular rhythm. Heart sounds: Normal heart sounds. No murmur heard. No friction rub. No gallop. Pulmonary:      Effort: Pulmonary effort is normal. No respiratory distress. Breath sounds: Normal breath sounds. No stridor. No wheezing or rales. Chest:      Chest wall: No tenderness. Abdominal:      General: Bowel sounds are normal.      Palpations: Abdomen is soft. Tenderness: There is no abdominal tenderness. Musculoskeletal:      Cervical back: Normal range of motion and neck supple. Lymphadenopathy:      Cervical: No cervical adenopathy. Skin:     General: Skin is warm and dry. Neurological:      Mental Status: She is alert and oriented to person, place, and time. Psychiatric:         Behavior: Behavior normal.         Judgment: Judgment normal.                An electronic signature was used to authenticate this note.     --CARLOS MANUEL Harvey - CNP

## 2022-12-16 NOTE — CARE COORDINATION
Spoke with patient who advised would like to complete POA and living will this visit. Spouse present for conversation. Denied concerns or questions at this time.

## 2022-12-16 NOTE — H&P
Hospitalist History & Physical    Patient: Gaby Navarro    Unit/Bed:ROSEMARIE /ROSEMARIE  YOB: 1955  MRN: [id number]   Acct: [de-identified]   PCP: Terence Wolfe MD  Code Status: Prior    Date of Service: Pt seen/examined on 12/16/22 and admitted to Inpatient with expected LOS greater than two midnights due to medical therapy. Chief Complaint: dehydration    Assessment/Plan:    ERUM on CKD stage IV: likely pre-renal d/t very poor po intake, dehydration. Check urine lytes. Follows with Dr. Ceasar Montanez, consulted. Continue IVF. AGMA: likely 2/2 ERUM/dehydration. Check lactic acid. Nephrology consulted. Hypocalcemia: Check iCAL and replace as indicated. Abdominal pain, N/V: pts N/V may be a side effect of doxy, will hold. If abdominal pain or N/V persist, will consider KUB vs CT AP. URI: pt has been on doxycycline started 12/12. No evidence of bacterial infection, will hold off on further ABX. Supportive care. Essential HTN: resume home amlodipine. H/o CVA: cont ASA, Plavix, statin. Depression/anxiety: resume home meds. History of Present Illness: Gaby Navarro is a 69 y.o. female with PMHx of CVA, depression, who presented to [address] [place] [address] with chief complaint of dehydration, N/V. Patient states that she was seen by her PCP on Monday, 12/12 for congestion, sore throat, fatigue, weakness on going for >2 weeks. She was started on PO doxycycline. She continues to feel congested but has no sinus pain or pressure, no cough, afebrile. Approx 2 days after starting doxy, patient developed N/V. Reports very poor PO intake, states she has only been eating ice chips. Reports lower abdominal pain, last BM 3 days ago. Reports decreased urine output, states it is very dark. Denies dysuria, urgency, frequency. No chest pain, SOB, f/c. Pt follows with Dr. Ceasar Montanez for CKD. Admitted to med/tele for further management. Review of Systems: Pertinent positives as noted in the HPI.  All other systems reviewed and negative. Past Medical History:        Diagnosis Date    Allergic rhinitis     Anxiety     CVA (cerebral infarction)     Depression     Fibromyalgia     Headache(784.0)     Hyperlipidemia     Hypertension     Irritable bowel syndrome     Kidney failure     Unspecified cerebral artery occlusion with cerebral infarction     Unspecified sleep apnea        Past Surgical History:        Procedure Laterality Date    CARPAL TUNNEL RELEASE Right     [facility]COPY  2012    [facility]    ENDOSCOPY, COLON, DIAGNOSTIC  unsure    EYE SURGERY      lasik    HEMORRHOID SURGERY  unsure    HYSTERECTOMY (CERVIX STATUS UNKNOWN)      age 34    NECK SURGERY  18  years ago    fusion    OVARY REMOVAL Bilateral     age 34    SINUS SURGERY  10 years ago    TUBAL LIGATION         Home Medications:   No current facility-administered medications on file prior to encounter.      Current Outpatient Medications on File Prior to Encounter   Medication Sig Dispense Refill    traZODone (DESYREL) 50 MG tablet TAKE 1 TABLET NIGHTLY 90 tablet 3    montelukast (SINGULAIR) 10 MG tablet Take 1 tablet by mouth daily 30 tablet 5    doxycycline hyclate (VIBRAMYCIN) 100 MG capsule Take 1 capsule by mouth 2 times daily for 10 days 20 capsule 0    buPROPion (WELLBUTRIN SR) 200 MG extended release tablet Take 1 tablet by mouth 2 times daily 180 tablet 3    simvastatin (ZOCOR) 20 MG tablet TAKE 1 TABLET BY MOUTH DAILY 90 tablet 3    clopidogrel (PLAVIX) 75 MG tablet TAKE 1 TABLET BY MOUTH DAILY 90 tablet 2    chlordiazePOXIDE-clidinium (LIBRAX) 5-2.5 MG per capsule TAKE 1 CAPSULE TWICE A DAY AS NEEDED FOR PAIN 180 capsule 3    amLODIPine (NORVASC) 5 MG tablet TAKE 1 TABLET BY MOUTH DAILY 90 tablet 3    divalproex (DEPAKOTE ER) 250 MG extended release tablet TAKE 1 TABLET BY MOUTH DAILY      bisacodyl (DULCOLAX) 5 MG EC tablet Take 5 mg by mouth daily as needed for Constipation      simethicone (MYLICON) 80 MG chewable tablet Take 80 mg by mouth 2 times daily      triamcinolone (NASACORT) 55 MCG/ACT nasal inhaler 2 sprays by Each Nostril route daily As needed      Polyethylene Glycol 3350 (MIRALAX PO) Take by mouth      cetirizine (ZYRTEC ALLERGY) 10 MG tablet Take 1 tablet by mouth daily 30 tablet 11    Tetrahydrozoline-Zn Sulfate (EYE DROPS ALLERGY RELIEF OP) Apply 1 drop to eye daily      Probiotic Product (PROBIOTIC-10) CHEW Take by mouth daily      linaclotide (LINZESS) 290 MCG CAPS capsule Take 290 mcg by mouth every morning (before breakfast)       denosumab (PROLIA) 60 MG/ML SOLN SC injection Inject 60 mg into the skin once      folic acid (FOLVITE) 299 MCG tablet Take 400 mcg by mouth daily      b complex vitamins capsule Take 1 capsule by mouth daily 100 capsule 3    FISH OIL 1,000 mg by Does not apply route 2 times daily Indications: Decreased Energy       aspirin 81 MG tablet Take 81 mg by mouth daily. Allergies:    Pioglitazone, Amoxicillin, Amoxicillin-pot clavulanate, Other, Ciprofloxacin, and Sulfa antibiotics    Social History:    reports that she has never smoked. She has never used smokeless tobacco. She reports that she does not drink alcohol and does not use drugs. Family History:       Problem Relation Age of Onset    Diabetes Mother     High Blood Pressure Mother     Heart Disease Mother     Heart Disease Father     Parkinsonism Father     Neurofibromatosis Father     Depression Father     Alcohol Abuse Father     Neurofibromatosis Sister     Neurofibromatosis Brother     Other Brother         multiple sclerosis    Dementia Brother     Diabetes Sister     High Blood Pressure Sister     Depression Sister     Diabetes Brother        Diet:  No diet orders on file      Physical Exam:  /62   Pulse 91   Temp 97.7 °F (36.5 °C) (Oral)   Resp 18   Ht 5' (1.524 m)   Wt 114 lb (51.7 kg)   SpO2 100%   BMI 22.26 kg/m²   General:   Pleasant female. NAD. HEENT:  normocephalic and atraumatic. No scleral icterus. PERR.  Neck: supple. No JVD. No thyromegaly. Lungs: clear to auscultation. No retractions  Cardiac: RRR without murmur. Abdomen: soft. Suprapubic tenderness. Bowel sounds positive. Extremities:  No clubbing, cyanosis, or edema x 4. Vasculature: capillary refill < 3 seconds. Palpable LE pulses bilaterally. Skin:  warm and dry. No rashes or lesions. Psych:  Alert and oriented x3. Affect appropriate  Lymph:  No supraclavicular adenopathy. Neurologic:  No focal deficit. No seizures. Data: (All radiographs, tracings, PFTs, and imaging are personally viewed and interpreted unless otherwise noted)  Labs:   Recent Labs     12/16/22  1100   WBC 8.3   HGB 10.3*   HCT 30.6*        Recent Labs     12/16/22  1100      K 3.8      CO2 13*   BUN 96*   CREATININE 8.2*   CALCIUM 6.6*     Recent Labs     12/16/22  1100   AST 21   ALT 15   BILITOT 0.4   ALKPHOS 98     No results for input(s): INR in the last 72 hours. No results for input(s): Brittney Kaska in the last 72 hours. Urinalysis:   Lab Results   Component Value Date/Time    NITRU NEGATIVE 07/13/2022 11:15 PM    WBCUA 5-9 07/13/2022 11:15 PM    BACTERIA NONE SEEN 07/13/2022 11:15 PM    RBCUA NONE SEEN 07/13/2022 11:15 PM    BLOODU NEGATIVE 07/13/2022 11:15 PM    SPECGRAV 1.013 07/13/2022 11:15 PM    GLUCOSEU Negative 07/16/2021 04:09 PM    GLUCOSEU NEGATIVE 06/09/2020 03:03 PM       EKG:  NSR. RSR V1. Prolonged QT    Radiology:  XR CHEST (2 VW)   Final Result   1. No interval change since previous study dated 9/18/2021. Nakul Esparza **This report has been created using voice recognition software. It may contain minor errors which are inherent in voice recognition technology. **      Final report electronically signed by DR Edson Landon on 12/16/2022 11:36 AM        XR CHEST (2 VW)    Result Date: 12/16/2022  PROCEDURE: XR CHEST (2 VW) CLINICAL INFORMATION: SOB, cough. COMPARISON: Chest x-ray dated 9/18/2021. . TECHNIQUE: PA and lateral views the chest. FINDINGS: The heart size is normal.  The mediastinum is not widened. There are no pulmonary infiltrates or effusions. The pulmonary vascularity is normal. There are postoperative changes in the lower cervical spine. .  There is mild thoracic spondylosis. 1. No interval change since previous study dated 9/18/2021. Jennifer Crumble **This report has been created using voice recognition software. It may contain minor errors which are inherent in voice recognition technology. ** Final report electronically signed by DR Courtney Escobedo on 12/16/2022 11:36 AM        Tele:   [x] yes             [] no      Thank you Burton Jameson MD for the opportunity to be involved in this patient's care.     Electronically signed by Sheldon Tolbert PA-C on 12/16/2022 at 1:51 PM

## 2022-12-16 NOTE — ED NOTES
Pt is stable and set for transport to [facility]. Charge nurse Kay Donaldson has been notified.      Dafne Zarco  12/16/22 [id number]

## 2022-12-16 NOTE — ED NOTES
Unable to complete EKG at this time d/t pt tremoring. Provider made aware.  Will attempt at a later time     Matthew Ro RN  12/16/22 [id number]
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
## 2022-12-16 NOTE — ED PROVIDER NOTES
I performed a history and physical examination of the patient and discussed management with the resident. I reviewed the residents note and agree with the documented findings and plan of care. Any areas of disagreement are noted on the chart. I was personally present for the key portions of any procedures. I have documented in the chart those procedures where I was not present during the key portions. I have reviewed the emergency nurses triage note. I agree with the chief complaint, past medical history, past surgical history, allergies, medications, social and family history as documented unless otherwise noted below. Documentation of the HPI, Physical Exam and Medical Decision Making performed by medical students or scribes is based on my personal performance of the HPI, PE and MDM. For Phys Assistant/ Nurse Practitioner cases/documentation I have personally evaluated this patient and have completed at least one if not all key elements of the E/M (history, physical exam, and MDM). My findings are as noted below     Patient was seen today at the primary care office for vomiting for the last 7 days sent in for dehydration. She has a history of stage IV kidney disease. Patient is complaining of continuous vomiting. States she has not been able to keep anything down. Minor abdominal cramping. Patient has had no overt chest pain or shortness of breath. Patient is otherwise resting comfortably on the cot no apparent distress no other physical complaints at this time. Normal sinus rhythm normal axis ventricular rate of 89 AZ interval 144 QRS duration 100 QT interval 408 QTC of 496. XR CHEST (2 VW)   Final Result   1. No interval change since previous study dated 9/18/2021. Golden Morales **This report has been created using voice recognition software. It may contain minor errors which are inherent in voice recognition technology. **      Final report electronically signed by DR Mary Ann Barclay on 12/16/2022 11:36 AM        Labs Reviewed   CBC WITH AUTO DIFFERENTIAL - Abnormal; Notable for the following components:       Result Value    RBC 3.34 (*)     Hemoglobin 10.3 (*)     Hematocrit 30.6 (*)     Immature Grans (Abs) 0.10 (*)     All other components within normal limits   COMPREHENSIVE METABOLIC PANEL - Abnormal; Notable for the following components:    Creatinine 8.2 (*)     BUN 96 (*)     CO2 13 (*)     Calcium 6.6 (*)     All other components within normal limits   TROPONIN - Abnormal; Notable for the following components:    Troponin T 0.033 (*)     All other components within normal limits   GLOMERULAR FILTRATION RATE, ESTIMATED - Abnormal; Notable for the following components:    Est, Glom Filt Rate 5 (*)     All other components within normal limits   OSMOLALITY - Abnormal; Notable for the following components:    Osmolality Calc 314.8 (*)     All other components within normal limits   ANION GAP - Abnormal; Notable for the following components:    Anion Gap 27.0 (*)     All other components within normal limits   CALCIUM, IONIZED - Abnormal; Notable for the following components:    Calcium, Ionized 0.89 (*)     All other components within normal limits   URINALYSIS WITH MICROSCOPIC - Abnormal; Notable for the following components:    Ketones, Urine TRACE (*)     Blood, Urine TRACE (*)     Protein, UA 30 (*)     Leukocyte Esterase, Urine SMALL (*)     All other components within normal limits   COVID-19 & INFLUENZA COMBO   CULTURE, THROAT    Narrative:     Source: Specimen not received       Site:           Current Antibiotics:   LACTIC ACID   MAGNESIUM   GROUP A STREP, REFLEX   LIPASE   CREATININE, RANDOM URINE   SODIUM, URINE, RANDOM   OSMOLALITY, URINE   EOSINOPHIL SMEAR, URINE   COMPREHENSIVE METABOLIC PANEL W/ REFLEX TO MG FOR LOW K   CBC WITH AUTO DIFFERENTIAL         Final diagnoses:   Dehydration   Nausea and vomiting, unspecified vomiting type   Acute renal failure superimposed on chronic kidney disease, unspecified CKD stage, unspecified acute renal failure type ([facility] [address]   . I seen this patient with Dr. Mahi Tucker, the resident, and agree with his assessment and plan.      [facility], DO  12/16/22 2046

## 2022-12-16 NOTE — ED TRIAGE NOTES
Pt to ED through triage with c/c dehydration. Pt seen at PCP office today d/t vomiting for the last 7 days. Pt has hx stage 4 kidney failure and PCP voiced concern for further kidney damage and instructed pt to be seen in ED. Pt reports achy pain in abdomen d/t vomiting. Pt tremor ing on arrival to ED. Warm blankets provided. Telemetry in place.
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
## 2022-12-16 NOTE — ED NOTES
ED to inpatient nurses report    Chief Complaint   Patient presents with    Dehydration    Emesis      Present to ED from home  LOC: alert and orientated to name, place, date  Vital signs   Vitals:    12/16/22 1046 12/16/22 1130 12/16/22 1236   BP: (!) 141/65 (!) 153/65 135/62   Pulse: 88 88 91   Resp: 18 18 18   Temp: 97.7 °F (36.5 °C)     TempSrc: Oral     SpO2: 100% 100% 100%   Weight: 114 lb (51.7 kg)     Height: 5' (1.524 m)        Oxygen Baseline room air    Current needs required none   LDAs:   Peripheral IV 12/16/22 Left Antecubital (Active)   Site Assessment Clean, dry & intact 12/16/22 1055   Phlebitis Assessment No symptoms 12/16/22 1055   Infiltration Assessment 0 12/16/22 1055   Dressing Status Clean, dry & intact 12/16/22 1055   Dressing Type Transparent 12/16/22 1055   Dressing Intervention New 12/16/22 1055     Mobility: x1 assist  Pending ED orders: need UA  Present condition: stable      C-SSRS Risk of Suicide: No Risk  Swallow Screening    Preferred Language: [place]     Electronically signed by Gianna Mauro RN on 12/16/2022 at 12:58 PM     Gianna Mauro RN  12/16/22 5952

## 2022-12-16 NOTE — PROGRESS NOTES
Pharmacy Renal Adjustment    Edson Joya is a 69 y.o. female. Pharmacy renally adjust the following medications per P&T approved policy: cetirizine    Height:   Ht Readings from Last 1 Encounters:   12/16/22 5' (1.524 m)     Weight:  Wt Readings from Last 1 Encounters:   12/16/22 114 lb (51.7 kg)     Recent Labs     12/16/22  1100   BUN 96*   CREATININE 8.2*     Estimated Creatinine Clearance: 5 mL/min (A) (based on SCr of 8.2 mg/dL [facility]ARE AT [facility])). Assessment:  ERUM on CKD    Plan:   Decrease cetirizine 10mg nightly prn to 5mg nightly prn.                   Jamie Campos, PharmD, [facility] 12/16/2022 2:59 PM

## 2022-12-16 NOTE — ACP (ADVANCE CARE PLANNING)
Advance Care Planning     Advance Care Planning Inpatient Note  Spiritual Care Department    Today's Date: 12/16/2022  Unit: [facility] RENAL TELEMETRY 6K    Received request from IDT Member. Upon review of chart and communication with care team, patient's decision making abilities are not in question. . Patient and Spouse was/were present in the room during visit. Goals of ACP Conversation:  Discuss advance care planning documents    Health Care Decision Makers:       Primary Decision Maker: Violette Govea Spouse - [phone]  Summary:  Completed New Documents    Advance Care Planning Documents (Patient Wishes):  Healthcare Power of /Advance Directive Appointment of Health Care Agent  Living Will/Advance Directive     Assessment:  Advanced Directives Consult: It was completed and filed and a copy sent to medical records. Prayer was appreciated as she was dealing with acute kidney injury. Her  was present at the time of the visit. Interventions:  Assisted in the completion of documents according to patient's wishes at this time        Outcomes/Plan:  New advance directive completed.     Electronically signed by [facility] on 12/16/2022 at 5:15 PM

## 2022-12-17 ENCOUNTER — APPOINTMENT (OUTPATIENT)
Dept: CT IMAGING | Age: 67
End: 2022-12-17
Payer: MEDICARE

## 2022-12-17 PROBLEM — R04.2 HEMOPTYSIS: Status: ACTIVE | Noted: 2022-12-17

## 2022-12-17 PROBLEM — E83.39 HYPERPHOSPHATEMIA: Status: ACTIVE | Noted: 2022-12-17

## 2022-12-17 PROBLEM — E87.6 HYPOKALEMIA: Status: ACTIVE | Noted: 2022-12-17

## 2022-12-17 PROBLEM — E83.51 HYPOCALCEMIA: Status: ACTIVE | Noted: 2022-12-17

## 2022-12-17 PROBLEM — E87.29 METABOLIC ACIDOSIS, INCREASED ANION GAP: Status: ACTIVE | Noted: 2022-12-16

## 2022-12-17 PROBLEM — N18.9 ACUTE KIDNEY INJURY SUPERIMPOSED ON CHRONIC KIDNEY DISEASE (HCC): Status: ACTIVE | Noted: 2022-12-16

## 2022-12-17 PROBLEM — E86.0 DEHYDRATION: Status: ACTIVE | Noted: 2022-12-17

## 2022-12-17 PROBLEM — E83.42 HYPOMAGNESEMIA: Status: ACTIVE | Noted: 2022-12-17

## 2022-12-17 LAB
ALBUMIN SERPL-MCNC: 3.4 G/DL (ref 3.5–5.1)
ALP BLD-CCNC: 79 U/L (ref 38–126)
ALT SERPL-CCNC: 14 U/L (ref 11–66)
ANION GAP SERPL CALCULATED.3IONS-SCNC: 22 MEQ/L (ref 8–16)
AST SERPL-CCNC: 23 U/L (ref 5–40)
BASOPHILS # BLD: 0.7 %
BASOPHILS ABSOLUTE: 0.1 THOU/MM3 (ref 0–0.1)
BILIRUB SERPL-MCNC: 0.4 MG/DL (ref 0.3–1.2)
BUN BLDV-MCNC: 90 MG/DL (ref 7–22)
CALCIUM SERPL-MCNC: 6.2 MG/DL (ref 8.5–10.5)
CHLORIDE BLD-SCNC: 108 MEQ/L (ref 98–111)
CO2: 17 MEQ/L (ref 23–33)
CREAT SERPL-MCNC: 7.3 MG/DL (ref 0.4–1.2)
EOSINOPHIL # BLD: 0.1 %
EOSINOPHILS ABSOLUTE: 0 THOU/MM3 (ref 0–0.4)
ERYTHROCYTE [DISTWIDTH] IN BLOOD BY AUTOMATED COUNT: 13.2 % (ref 11.5–14.5)
ERYTHROCYTE [DISTWIDTH] IN BLOOD BY AUTOMATED COUNT: 42.6 FL (ref 35–45)
FERRITIN: 402 NG/ML (ref 10–291)
FOLATE: > 20 NG/ML (ref 4.8–24.2)
GFR SERPL CREATININE-BSD FRML MDRD: 6 ML/MIN/1.73M2
GLUCOSE BLD-MCNC: 70 MG/DL (ref 70–108)
HCT VFR BLD CALC: 23.8 % (ref 37–47)
HCT VFR BLD CALC: 25.4 % (ref 37–47)
HEMOGLOBIN: 8.3 GM/DL (ref 12–16)
HEMOGLOBIN: 8.7 GM/DL (ref 12–16)
IMMATURE GRANS (ABS): 0.13 THOU/MM3 (ref 0–0.07)
IMMATURE GRANULOCYTES: 1.5 %
IRON SATURATION: 90 % (ref 20–50)
IRON: 145 UG/DL (ref 50–170)
LYMPHOCYTES # BLD: 25.2 %
LYMPHOCYTES ABSOLUTE: 2.1 THOU/MM3 (ref 1–4.8)
MAGNESIUM: 1.5 MG/DL (ref 1.6–2.4)
MCH RBC QN AUTO: 31.2 PG (ref 26–33)
MCHC RBC AUTO-ENTMCNC: 34.9 GM/DL (ref 32.2–35.5)
MCV RBC AUTO: 89.5 FL (ref 81–99)
MONOCYTES # BLD: 7.6 %
MONOCYTES ABSOLUTE: 0.6 THOU/MM3 (ref 0.4–1.3)
NUCLEATED RED BLOOD CELLS: 0 /100 WBC
PHOSPHORUS: 7.4 MG/DL (ref 2.4–4.7)
PLATELET # BLD: 131 THOU/MM3 (ref 130–400)
PMV BLD AUTO: 10.9 FL (ref 9.4–12.4)
POTASSIUM REFLEX MAGNESIUM: 3.1 MEQ/L (ref 3.5–5.2)
POTASSIUM SERPL-SCNC: 3.1 MEQ/L (ref 3.5–5.2)
RBC # BLD: 2.66 MILL/MM3 (ref 4.2–5.4)
SEG NEUTROPHILS: 64.9 %
SEGMENTED NEUTROPHILS ABSOLUTE COUNT: 5.5 THOU/MM3 (ref 1.8–7.7)
SODIUM BLD-SCNC: 147 MEQ/L (ref 135–145)
TOTAL IRON BINDING CAPACITY: < 162 UG/DL (ref 171–450)
TOTAL PROTEIN: 5.7 G/DL (ref 6.1–8)
VITAMIN B-12: > 2000 PG/ML (ref 211–911)
WBC # BLD: 8.5 THOU/MM3 (ref 4.8–10.8)

## 2022-12-17 PROCEDURE — 82607 VITAMIN B-12: CPT

## 2022-12-17 PROCEDURE — 6360000002 HC RX W HCPCS

## 2022-12-17 PROCEDURE — 71250 CT THORAX DX C-: CPT

## 2022-12-17 PROCEDURE — 84100 ASSAY OF PHOSPHORUS: CPT

## 2022-12-17 PROCEDURE — 2580000003 HC RX 258: Performed by: INTERNAL MEDICINE

## 2022-12-17 PROCEDURE — 83550 IRON BINDING TEST: CPT

## 2022-12-17 PROCEDURE — 6370000000 HC RX 637 (ALT 250 FOR IP)

## 2022-12-17 PROCEDURE — 80053 COMPREHEN METABOLIC PANEL: CPT

## 2022-12-17 PROCEDURE — 85018 HEMOGLOBIN: CPT

## 2022-12-17 PROCEDURE — 83540 ASSAY OF IRON: CPT

## 2022-12-17 PROCEDURE — 85014 HEMATOCRIT: CPT

## 2022-12-17 PROCEDURE — 82306 VITAMIN D 25 HYDROXY: CPT

## 2022-12-17 PROCEDURE — 36415 COLL VENOUS BLD VENIPUNCTURE: CPT

## 2022-12-17 PROCEDURE — 6360000002 HC RX W HCPCS: Performed by: PHYSICIAN ASSISTANT

## 2022-12-17 PROCEDURE — 2580000003 HC RX 258

## 2022-12-17 PROCEDURE — 83735 ASSAY OF MAGNESIUM: CPT

## 2022-12-17 PROCEDURE — 2500000003 HC RX 250 WO HCPCS

## 2022-12-17 PROCEDURE — 82746 ASSAY OF FOLIC ACID SERUM: CPT

## 2022-12-17 PROCEDURE — 84132 ASSAY OF SERUM POTASSIUM: CPT

## 2022-12-17 PROCEDURE — 92610 EVALUATE SWALLOWING FUNCTION: CPT | Performed by: SPEECH-LANGUAGE PATHOLOGIST

## 2022-12-17 PROCEDURE — 82728 ASSAY OF FERRITIN: CPT

## 2022-12-17 PROCEDURE — 2500000003 HC RX 250 WO HCPCS: Performed by: INTERNAL MEDICINE

## 2022-12-17 PROCEDURE — 87086 URINE CULTURE/COLONY COUNT: CPT

## 2022-12-17 PROCEDURE — 1200000003 HC TELEMETRY R&B

## 2022-12-17 PROCEDURE — 99232 SBSQ HOSP IP/OBS MODERATE 35: CPT | Performed by: INTERNAL MEDICINE

## 2022-12-17 PROCEDURE — 94669 MECHANICAL CHEST WALL OSCILL: CPT

## 2022-12-17 PROCEDURE — 6370000000 HC RX 637 (ALT 250 FOR IP): Performed by: PHYSICIAN ASSISTANT

## 2022-12-17 PROCEDURE — 99233 SBSQ HOSP IP/OBS HIGH 50: CPT

## 2022-12-17 PROCEDURE — 85025 COMPLETE CBC W/AUTO DIFF WBC: CPT

## 2022-12-17 RX ORDER — SENNA PLUS 8.6 MG/1
1 TABLET ORAL NIGHTLY
Status: DISCONTINUED | OUTPATIENT
Start: 2022-12-17 | End: 2022-12-30 | Stop reason: HOSPADM

## 2022-12-17 RX ORDER — LANOLIN ALCOHOL/MO/W.PET/CERES
400 CREAM (GRAM) TOPICAL ONCE
Status: COMPLETED | OUTPATIENT
Start: 2022-12-17 | End: 2022-12-17

## 2022-12-17 RX ORDER — POTASSIUM CHLORIDE 20 MEQ/1
40 TABLET, EXTENDED RELEASE ORAL ONCE
Status: DISCONTINUED | OUTPATIENT
Start: 2022-12-17 | End: 2022-12-17

## 2022-12-17 RX ORDER — DOCUSATE SODIUM 100 MG/1
100 CAPSULE, LIQUID FILLED ORAL DAILY
Status: DISCONTINUED | OUTPATIENT
Start: 2022-12-17 | End: 2022-12-30 | Stop reason: HOSPADM

## 2022-12-17 RX ORDER — CALCIUM GLUCONATE 20 MG/ML
2000 INJECTION, SOLUTION INTRAVENOUS ONCE
Status: COMPLETED | OUTPATIENT
Start: 2022-12-17 | End: 2022-12-17

## 2022-12-17 RX ADMIN — CLOPIDOGREL BISULFATE 75 MG: 75 TABLET ORAL at 08:31

## 2022-12-17 RX ADMIN — BUPROPION HYDROCHLORIDE 200 MG: 100 TABLET, FILM COATED, EXTENDED RELEASE ORAL at 08:24

## 2022-12-17 RX ADMIN — SODIUM BICARBONATE: 84 INJECTION, SOLUTION INTRAVENOUS at 13:09

## 2022-12-17 RX ADMIN — SODIUM BICARBONATE: 84 INJECTION, SOLUTION INTRAVENOUS at 02:51

## 2022-12-17 RX ADMIN — HEPARIN SODIUM 5000 UNITS: 5000 INJECTION INTRAVENOUS; SUBCUTANEOUS at 05:34

## 2022-12-17 RX ADMIN — BUPROPION HYDROCHLORIDE 200 MG: 100 TABLET, FILM COATED, EXTENDED RELEASE ORAL at 20:40

## 2022-12-17 RX ADMIN — Medication 400 MG: at 13:05

## 2022-12-17 RX ADMIN — AMLODIPINE BESYLATE 5 MG: 5 TABLET ORAL at 08:23

## 2022-12-17 RX ADMIN — HEPARIN SODIUM 5000 UNITS: 5000 INJECTION INTRAVENOUS; SUBCUTANEOUS at 21:18

## 2022-12-17 RX ADMIN — POLYETHYLENE GLYCOL 3350 17 G: 17 POWDER, FOR SOLUTION ORAL at 08:32

## 2022-12-17 RX ADMIN — MONTELUKAST SODIUM 10 MG: 10 TABLET ORAL at 20:40

## 2022-12-17 RX ADMIN — FOLIC ACID 500 MCG: 1 TABLET ORAL at 08:24

## 2022-12-17 RX ADMIN — ATORVASTATIN CALCIUM 10 MG: 10 TABLET, FILM COATED ORAL at 08:23

## 2022-12-17 RX ADMIN — TRAZODONE HYDROCHLORIDE 50 MG: 50 TABLET ORAL at 20:39

## 2022-12-17 RX ADMIN — ONDANSETRON 4 MG: 4 TABLET, ORALLY DISINTEGRATING ORAL at 10:41

## 2022-12-17 RX ADMIN — ASPIRIN 81 MG: 81 TABLET, COATED ORAL at 20:43

## 2022-12-17 RX ADMIN — SENNOSIDES 8.6 MG: 8.6 TABLET, COATED ORAL at 20:39

## 2022-12-17 RX ADMIN — DOCUSATE SODIUM 100 MG: 100 CAPSULE, LIQUID FILLED ORAL at 10:42

## 2022-12-17 RX ADMIN — HEPARIN SODIUM 5000 UNITS: 5000 INJECTION INTRAVENOUS; SUBCUTANEOUS at 16:43

## 2022-12-17 RX ADMIN — CALCIUM GLUCONATE 2000 MG: 20 INJECTION, SOLUTION INTRAVENOUS at 14:00

## 2022-12-17 RX ADMIN — POTASSIUM BICARBONATE 40 MEQ: 782 TABLET, EFFERVESCENT ORAL at 10:41

## 2022-12-17 RX ADMIN — ONDANSETRON 4 MG: 2 INJECTION INTRAMUSCULAR; INTRAVENOUS at 20:39

## 2022-12-17 ASSESSMENT — PAIN DESCRIPTION - DESCRIPTORS: DESCRIPTORS: ACHING

## 2022-12-17 ASSESSMENT — PAIN SCALES - GENERAL
PAINLEVEL_OUTOF10: 3
PAINLEVEL_OUTOF10: 0

## 2022-12-17 ASSESSMENT — PAIN DESCRIPTION - LOCATION: LOCATION: ABDOMEN

## 2022-12-17 ASSESSMENT — PAIN DESCRIPTION - PAIN TYPE: TYPE: ACUTE PAIN

## 2022-12-17 ASSESSMENT — PAIN DESCRIPTION - ORIENTATION: ORIENTATION: LOWER

## 2022-12-17 NOTE — PROGRESS NOTES
Hospitalist Progress Note    Patient: Raiza Rodriguez      Unit/Bed:6K-06/006-A    YOB: 1955    MRN: [id number]       Acct: [de-identified]     PCP: Michelle Velazquez MD    Date of Admission: 12/16/2022    Assessment/Plan:    ERUM on CKD stage IV:   Baseline creatinine range ~ 1.8-2. Creatinine slowly down trending from 8.2 (POA). Avoid nephrotoxic agents, renally dose medications. Obtain daily standing weight  Strict I&O's  Nephrology consulted and following appreciate recs  High AGMA:   Likely due to ERUM/dehydration. On bicarb drip. Lactic acid 0.8. Nephrology following  Hemoptysis  Patient noted to have 3 episodes of hemoptysis with coughing. Nurse was able to send me a picture of the bloody sputum she coughed up. CT chest notable for minimal atelectatic/fibrotic stranding left lung base and lingula. Sputum culture ordered  Pulmonology consulted, appreciate recs  Hypokalemia:   Creatinine clearance 5 mL/min. Order for one-time dose 40 mill equivalents p.o. potassium. Repeat potassium level this afternoon, consider additional dose if needed. Daily lab  Hypocalcemia:   Order for calcium gluconate. Calcium replacement protocol. Daily iCal  Hypernatremia:   Secondary to #1, nephrology following  Hypomagnesemia:   Order for p.o. Mag ox  Check daily mag level  Hyperphosphatemia:   Spoke with nephrology. No need for phosphorus binders at this time. D/t ERUM, should resolve as ERUM improves. Daily lab. Elevated troponin  Was 0.033 (POA). Patient denies chest pains, ECG notable for normal sinus rhythm without ischemic changes,  ms . Suspect likely d/t cardiorenal syndrome given #1. Abdominal pain, nausea, vomiting:   Patient reports N/V, which may be from limited intake of food. Denies abdominal pain. Reports last BM was on Tuesday. States she is passing gas. Stool softners added, miralax ordered. Consider KUB if no BM soon.   URI:   CXR negative for pulmonary infiltrates or effusions. Patient has been on doxycycline started on 12/12. No evidence of bacterial infection, will hold off on further antibiotics. Supportive care. Acute on chronic macrocytic anemia:   Baseline hemoglobin range ~10-11. H&H 8.3/23. 8. Suspect likely dilutional component from IVF. H&H stable at this time. Transfuse for hemoglobin less than 7 g/dL or hemodynamic instability pending. Iron studies notable for ferritin 4 2, iron 145, iron sat 90, TIBC less than 162, folate normal, vitamin B12 greater than 2000  Trend to monitor CBC. Chronic HFpEF, compensated:   Echo (2/15/2022) notable for EF 55 to 60%, no WMA, G1 DD, mild tricuspid regurg. Continue ASA, statin, Plavix, amlodipine. Daily weights, strict I&O's. Cardiac diet, 2G sodium  Essential hypertension, controlled:   Continue amlodipine  Hyperlipidemia:   Statin  Depression/anxiety:   Continue Wellbutrin, trazodone  History of CVA:   Continue aspirin, Plavix, statin      Expected discharge date:  Pending clinical course    Disposition:    [x] Home       [] TCU       [] Rehab       [] Psych       [] SNF       [] [place]       [] Other-    Chief Complaint: Dehydration    Hospital Course: Per HPI documented 12/16/2022: Choco Bruno is a 69 y.o. female with PMHx of CVA, depression, who presented to [facility] with chief complaint of dehydration, N/V. Patient states that she was seen by her PCP on Monday, 12/12 for congestion, sore throat, fatigue, weakness on going for >2 weeks. She was started on PO doxycycline. She continues to feel congested but has no sinus pain or pressure, no cough, afebrile. Approx 2 days after starting doxy, patient developed N/V. Reports very poor PO intake, states she has only been eating ice chips. Reports lower abdominal pain, last BM 3 days ago. Reports decreased urine output, states it is very dark. Denies dysuria, urgency, frequency.  No chest pain, SOB, f/c. Pt follows with  [facility] for CKD. Admitted to med/tele for further management. \"    12/17/22: Resumed care of patient today. Patient afebrile, satting 94% on room air, with hemodynamically stable vital signs. Patient stating she feels weak and tired this morning. States that she still feels nauseous and when she ate some breakfast this morning she started dry heaving and almost vomited. States that she has very little appetite. Also reported some intermittent abdominal cramping. States she is passing gas. States her last bowel movement was Tuesday. Stool softeners added. Multiple electrolyte derangements as noted above. Creatinine 7.3 today, down from 8.2. Discussed case with Dr. María Todd nephrology, who recommend decreasing bicarb drip to rate of 100 mL/hr. Patient making urine 900 mL OP overnight. Subjective (past 24 hours):      Denies chest pains, shortness of breath at rest, TUTTLE, palpitations, dizziness, lightheadedness, fever, chills.     Medications:  Reviewed    Infusion Medications    sodium chloride      sodium bicarbonate infusion Stopped (12/17/22 1359)     Scheduled Medications    docusate sodium  100 mg Oral Daily    senna  1 tablet Oral Nightly    potassium bicarb-citric acid  40 mEq Oral Daily    amLODIPine  5 mg Oral Daily    aspirin  81 mg Oral Daily    buPROPion  200 mg Oral BID    clopidogrel  75 mg Oral Daily    folic acid  727 mcg Oral Daily    montelukast  10 mg Oral Daily    atorvastatin  10 mg Oral Daily    traZODone  50 mg Oral Nightly    polyethylene glycol  17 g Oral Daily    sodium chloride flush  10 mL IntraVENous 2 times per day    heparin (porcine)  5,000 Units SubCUTAneous 3 times per day     PRN Meds: chlordiazePOXIDE-clidinium, fluticasone, simethicone, sodium chloride flush, sodium chloride, ondansetron **OR** ondansetron, acetaminophen **OR** acetaminophen, dextromethorphan-guaiFENesin, cetirizine, hydrALAZINE      Intake/Output Summary (Last 24 hours) at 12/17/2022 9823  Last data filed at 12/17/2022 1611  Gross per 24 hour   Intake 2716.1 ml   Output 1300 ml   Net 1416.1 ml       Diet:  ADULT DIET; Regular; 4 carb choices (60 gm/meal); Low Fat/Low Chol/High Fiber/2 gm Na    Exam:  BP (!) 144/66   Pulse 83   Temp 98.1 °F (36.7 °C) (Oral)   Resp 16   Ht 5' (1.524 m)   Wt 117 lb 14.4 oz (53.5 kg)   SpO2 99%   BMI 23.03 kg/m²     General appearance: No apparent distress, appears stated age and cooperative. HEENT: Pupils equal, round, and reactive to light. Conjunctivae/corneas clear. Neck: Supple, with full range of motion. No jugular venous distention. Trachea midline. Respiratory:  Normal respiratory effort, able to speak full clear sentences. Clear to auscultation, bilaterally without Rales/Wheezes/Rhonchi. Cardiovascular: Regular rate and rhythm with normal S1/S2 without murmurs, rubs or gallops. Abdomen: Soft, non-tender, non-distended with normal bowel sounds. Musculoskeletal: passive and active ROM x 4 extremities. Skin: Skin color, texture, turgor normal.  No rashes or lesions. Neurologic:  Neurovascularly intact without any focal sensory/motor deficits. Cranial nerves: II-XII intact, grossly non-focal.  Psychiatric: Alert and oriented, thought content appropriate, normal insight  Capillary Refill: Brisk,< 3 seconds   Peripheral Pulses: +2 palpable, equal bilaterally       Labs:   Recent Labs     12/16/22  1100 12/17/22  0517 12/17/22  1200   WBC 8.3 8.5  --    HGB 10.3* 8.3* 8.7*   HCT 30.6* 23.8* 25.4*    131  --      Recent Labs     12/16/22  1100 12/17/22  0517 12/17/22  1340    147*  --    K 3.8 3.1* 3.1*    108  --    CO2 13* 17*  --    BUN 96* 90*  --    CREATININE 8.2* 7.3*  --    CALCIUM 6.6* 6.2*  --    PHOS  --  7.4*  --      Recent Labs     12/16/22  1100 12/17/22  0517   AST 21 23   ALT 15 14   BILITOT 0.4 0.4   ALKPHOS 98 79     No results for input(s): INR in the last 72 hours.   No results for input(s): Gogo Height in the last 72 hours.    Microbiology:      Urinalysis:      Lab Results   Component Value Date/Time    NITRU NEGATIVE 12/16/2022 05:20 PM    WBCUA 10-15 12/16/2022 05:20 PM    BACTERIA NONE SEEN 12/16/2022 05:20 PM    RBCUA 5-10 12/16/2022 05:20 PM    BLOODU TRACE 12/16/2022 05:20 PM    SPECGRAV 1.010 12/16/2022 05:20 PM    GLUCOSEU Negative 07/16/2021 04:09 PM    GLUCOSEU NEGATIVE 06/09/2020 03:03 PM       Radiology:  XR CHEST (2 VW)    Result Date: 12/16/2022  PROCEDURE: XR CHEST (2 VW) CLINICAL INFORMATION: SOB, cough. COMPARISON: Chest x-ray dated 9/18/2021. . TECHNIQUE: PA and lateral views the chest. FINDINGS: The heart size is normal.  The mediastinum is not widened. There are no pulmonary infiltrates or effusions. The pulmonary vascularity is normal. There are postoperative changes in the lower cervical spine. .  There is mild thoracic spondylosis. 1. No interval change since previous study dated 9/18/2021. Raphael Son **This report has been created using voice recognition software. It may contain minor errors which are inherent in voice recognition technology. ** Final report electronically signed by DR Iqra Stacy on 12/16/2022 11:36 AM      DVT prophylaxis: [] Lovenox                                 [] SCDs                                 [x] SQ Heparin                                 [] Encourage ambulation           [] Already on Anticoagulation     Code Status: Full Code    PT/OT Eval Status: Pending recs    Tele:   [x] yes             [] no    Active Hospital Problems    Diagnosis Date Noted    ERUM (acute kidney injury) ([facility] [place] [address] [N17.9] 12/16/2022     Priority: Medium    Metabolic acidosis [W08.85] 12/16/2022     Priority: Medium       Electronically signed by CARLOS MANUEL Lee CNP on 12/17/2022 at 10:33 PM

## 2022-12-17 NOTE — PROGRESS NOTES
[facility]   [facility] [address]   [address], [facility]   [phone]   Nephrology Hospital progress note       12/17/2022, 4:28 PM        Pt Name:    Lori Cohen  MRN:     [id number]     YOB: 1955  Admit Date:    12/16/2022 10:35 AM  Primary Care Physician:  John Diego MD   Primary Nephrologist:          Dr. Marco Faria number  [id number]    ISOLATION: none     Admitting Chief Complaint:   Nausea and vomiting     History of Chief Complaint:  69 YOWF was admitted not feeling well with nausea and vomiting. She was found to have acute kidney injury     Nephrology is treating:   Acute kidney injury     Subjective: The patient was sitting up in the bedside chair. She feels improved. She is discouraged that her kidney function is not that much better. Her appetite is improving and nausea is resolving. Lab Results   Component Value Date/Time    LABGLOM 6 12/17/2022 05:17 AM    LABGLOM 5 12/16/2022 11:00 AM    LABGLOM 28 09/29/2022 01:53 PM    LABGLOM 21 09/14/2022 11:24 AM    LABGLOM 26 07/13/2022 10:25 PM    LABGLOM 28 05/06/2022 10:25 AM          Baseline eGFR    Admit eGFR    Current eGFR          Objective:    Diet: renal    VITALS:  /74   Pulse 98   Temp 98.2 °F (36.8 °C) (Oral)   Resp 16   Ht 5' (1.524 m)   Wt 117 lb 14.4 oz (53.5 kg)   SpO2 99%   BMI 23.03 kg/m²   24HR INTAKE/OUTPUT:    Intake/Output Summary (Last 24 hours) at 12/17/2022 1628  Last data filed at 12/17/2022 1611  Gross per 24 hour   Intake 2460.1 ml   Output 1500 ml   Net 960.1 ml     Admission weight: 114 lb (51.7 kg)  Wt Readings from Last 3 Encounters:   12/17/22 117 lb 14.4 oz (53.5 kg)   12/16/22 114 lb (51.7 kg)   12/12/22 116 lb (52.6 kg)     Body mass index is 23.03 kg/m². Physical examination    General:  Alert and cooperative with exam  HEENT:  Head: Normocephalic, no lesions, without obvious abnormality.   Neck:   no adenopathy, no carotid bruit, no JVD, supple, symmetrical, trachea midline, and thyroid not enlarged, symmetric, no tenderness/mass/nodules  Lungs:  clear to auscultation bilaterally  Heart:  regular rate and rhythm, S1, S2 normal, no murmur, click, rub or gallop  Abdomen:  soft, non-tender; bowel sounds normal; no masses,  no organomegaly  Extremities:  extremities normal, atraumatic, no cyanosis or edema  Neurologic:  Mental status: Alert, oriented, thought content appropriate  Psy:                 No evidence of depression. Mood is stable. Skin:                Warm and dry. No lesions or rashes noted. Muscles:         Hand  and leg strength are equal and strong bilaterally. Lab Data  CBC:   Recent Labs     12/16/22  1100 12/17/22  0517 12/17/22  1200   WBC 8.3 8.5  --    HGB 10.3* 8.3* 8.7*    131  --      BMP:  Recent Labs     12/16/22  1100 12/17/22  0517 12/17/22  1340    147*  --    K 3.8 3.1* 3.1*    108  --    CO2 13* 17*  --    BUN 96* 90*  --    CREATININE 8.2* 7.3*  --    GLUCOSE 99 70  --    CALCIUM 6.6* 6.2*  --    MG 1.7 1.5*  --    PHOS  --  7.4*  --      TSH: No results for input(s): TSH in the last 72 hours. HgBa1c: No results for input(s): LABA1C in the last 72 hours. Hepatic:   Recent Labs     12/16/22  1100 12/17/22  0517   LABALBU 4.3 3.4*   AST 21 23   ALT 15 14   BILITOT 0.4 0.4   ALKPHOS 98 79     Troponin: No results for input(s): TROPONINI in the last 72 hours. BNP: No results for input(s): BNP in the last 72 hours. Lipids: No results for input(s): CHOL, HDL in the last 72 hours. Invalid input(s): LDLCALCU  INR: No results for input(s): INR in the last 72 hours. Assessment:    Acute kidney injury-improving. High anion gap acidosis. CKD stage IV     Plan:    Continue IV hydration       Bar Damon. Kieran, DO  [facility].

## 2022-12-17 NOTE — PLAN OF CARE
Problem: Discharge Planning  Goal: Discharge to home or other facility with appropriate resources  12/17/2022 0220 by Ethel Ramos RN  Outcome: Progressing  Note: Patient plans to be discharged to her private home with her  when medically stable. Problem: Pain  Goal: Verbalizes/displays adequate comfort level or baseline comfort level  12/17/2022 0220 by Ethel Ramos RN  Outcome: Progressing  Note: Patient free from pain this shift. Pain rated on 0-10 pain rating scale. Will continue to reassess. Problem: Skin/Tissue Integrity  Goal: Absence of new skin breakdown  Description: 1. Monitor for areas of redness and/or skin breakdown  2. Assess vascular access sites hourly  3. Every 4-6 hours minimum:  Change oxygen saturation probe site  4. Every 4-6 hours:  If on nasal continuous positive airway pressure, respiratory therapy assess nares and determine need for appliance change or resting period. 12/17/2022 0220 by Ethel Ramos RN  Outcome: Progressing  Note: Patient free from skin breakdown. Patient turns self and makes frequent positional changes. Will continue to monitor. Problem: Safety - Adult  Goal: Free from fall injury  12/17/2022 0220 by Ethel Ramos RN  Note: Call light in reach, bed in lowest position, and bed alarm activated. Education given on use of call light before ambulation and when in need of assistance. Patient expressed understanding. Hourly visual checks performed and charted. Toileting offered to patient. No falls this shift, at any time. Arm band and falling star in place. Will continue to monitor.       Problem: Metabolic/Fluid and Electrolytes - Adult  Goal: Electrolytes maintained within normal limits  12/17/2022 0220 by Ethel Ramos RN  Outcome: Progressing  Flowsheets (Taken 12/17/2022 0220)  Electrolytes maintained within normal limits: Monitor labs and assess patient for signs and symptoms of electrolyte imbalances  Note: Patient's Intake and output are being monitored and labs evaluated for imbalances in electrolytes.

## 2022-12-17 NOTE — PROGRESS NOTES
[facility]  SPEECH THERAPY  STRZ RENAL TELEMETRY 6K  Clinical Swallow Evaluation      SLP Individual Minutes  Time In: 1440  Time Out: 5560  Minutes: 12  Timed Code Treatment Minutes: 0 Minutes       Date: 2022  Patient Name: Glen Rivas      CSN: [id number]   : 1955  (69 y.o.)  Gender: female   Referring Physician:  CARLOS MANUEL Dennis-CNP    Diagnosis: ERUM (acute kidney injury) ([facility] [address]    History of Present Illness/Injury: Per chart review: Matty Peters is a 69 y.o. female with PMHx of CVA, depression, who presented to [facility] with chief complaint of dehydration, N/V. Patient states that she was seen by her PCP on Monday,  for congestion, sore throat, fatigue, weakness on going for >2 weeks. She was started on PO doxycycline. She continues to feel congested but has no sinus pain or pressure, no cough, afebrile. Approx 2 days after starting doxy, patient developed N/V. Reports very poor PO intake, states she has only been eating ice chips. Reports lower abdominal pain, last BM 3 days ago. Reports decreased urine output, states it is very dark. Denies dysuria, urgency, frequency. No chest pain, SOB, f/c. Pt follows with Dr. Rafael Agrawal for CKD. Admitted to med/tele for further management. \" FLY Troy stating that patient reporting difficulty swallowing specific foods, with self management via food selections. *Of Note, Patient reporting that she has had OP speech therapy services several times in the past, most recently discharged from [facility] 3 months ago. Patient reporting that PCP has recently recommended she resume OP PT/OT/ST services. Patient with history of CVA (2 larger events and 2 smaller events), as well as recent concussion, all per patient report.      Past Medical History:   Diagnosis Date    Allergic rhinitis     Anxiety     CVA (cerebral infarction)     Depression     Fibromyalgia     Headache(784.0)     Hyperlipidemia     Hypertension Irritable bowel syndrome     Kidney failure     Unspecified cerebral artery occlusion with cerebral infarction     Unspecified sleep apnea        SUBJECTIVE:  Patient seen at bedside, requiring assist for repositioning further upright. RN Rachele Vasquez with approval to proceed with ST session. OBJECTIVE:    Pain:  No pain reported. Current Diet: Regular with thin liquids     Respiratory Status:  Room Air    Behavioral Observation:  Alert and Oriented    CRANIAL NERVE ASSESSMENT   CN V (Trigeminal) Closes and Opens Mandible WFL    Rotary Jaw Movement WFL      CN VII (Facial) Cheeks Hold Food out of Sulci WFL    Opens, Closes/Seals, Protrudes, Retracts Lips WFL    General Appearance WFL    Sensation Not Tested      CN X (Vagus - Pharyngeal) Raises Back of Tongue WFL      CN XI (Accessory) Lifts Soft Palate WFL      CN XII (Hypoglossal) Elevates Tongue Up and Back WFL    Protrusion   WFL    Lateralizes Tongue Impaired and Reduced coordination     Sensation WFL      Other Observations Dentition Missing molar, but otherwise intact    Vocal Quality WNL    Cough WNL     Patient Evaluated Using: Thin Liquids, Puree, and Coarse Solids    Oral Phase:  WFL    Pharyngeal Phase: WFL:  Pharyngeal phase appears WFL but cannot rule out pharyngeal phase deficits from a bedside swallowing evaluation alone. Signs and Symptoms of Laryngeal Penetration/Aspiration: No signs/symptoms of aspiration evident in this evaluation, but cannot rule out silent aspiration. Impressions: Patient presents with oral phase of swallow function that is essentially [facility] with inability to fully discern potential presence of pharyngeal phase deficits without formal instrumentation. Oral phase highly unremarkable during consumption of hard/textured solids with patient demonstrating adequate mastication pattern for textural breakdown, cohesive bolus formation, and manipulation.  Thin liquids consumed without overt difficulty and with suspected control/containment of fluid bolus. NO overt s/s aspiration exhibited across all consistencies/trials consumed, certainly not able to exclude pharyngeal phase dysfunction and/or airway invasion events in its entirety at bedside alone. Patient's swallow physiology does appear appropriate to support PO intake without distress with instrumental evaluation not warranted. Recommend continuation of regular diet with thin liquids. No further ST services are warranted at this time r/t dysphagia management given baseline status of swallow function achieved; please re-consult should further needs be identified. RECOMMENDATIONS/ASSESSMENT:  Instrumental Evaluation: Instrumental evaluation not indicated at this time. Diet Recommendations:  Regular with thin liquids   Strategies:  Full Upright Position and Small Bite/Sip   Rehabilitation Potential: excellent  Discharge Recommendations: Continue to Assess Pending Progress    EDUCATION:  Learner: Patient  Education:  Reviewed results and recommendations of this evaluation and Reviewed ST goals and Plan of Care  Evaluation of Education: Verbalizes understanding    PLAN:  Speech Therapy evaluation to assess speech, language, cognition and/or voice, given history of cognitive impairments    PATIENT GOAL:    Return to prior level of function. SHORT TERM GOALS:  Short Term Goals  Time Frame for Short Term Goals: 2 weeks  Goal 1: Complete cognitive-linguistic evaluation and establish POC as appropriate    LONG TERM GOALS:  No long term goals established due to estimated length of stay. Rebekah Connor.  Elena [address], [address]

## 2022-12-18 PROBLEM — N17.9 AKI (ACUTE KIDNEY INJURY) (HCC): Status: ACTIVE | Noted: 2022-12-18

## 2022-12-18 LAB
ALBUMIN SERPL-MCNC: 3.3 G/DL (ref 3.5–5.1)
ALP BLD-CCNC: 85 U/L (ref 38–126)
ALT SERPL-CCNC: 15 U/L (ref 11–66)
ANION GAP SERPL CALCULATED.3IONS-SCNC: 19 MEQ/L (ref 8–16)
AST SERPL-CCNC: 25 U/L (ref 5–40)
BASOPHILS # BLD: 0.5 %
BASOPHILS ABSOLUTE: 0 THOU/MM3 (ref 0–0.1)
BILIRUB SERPL-MCNC: 0.5 MG/DL (ref 0.3–1.2)
BUN BLDV-MCNC: 88 MG/DL (ref 7–22)
CALCIUM IONIZED: 0.67 MMOL/L (ref 1.12–1.32)
CALCIUM IONIZED: 0.81 MMOL/L (ref 1.12–1.32)
CALCIUM SERPL-MCNC: 6.6 MG/DL (ref 8.5–10.5)
CHLORIDE BLD-SCNC: 104 MEQ/L (ref 98–111)
CO2: 21 MEQ/L (ref 23–33)
CREAT SERPL-MCNC: 7.3 MG/DL (ref 0.4–1.2)
EKG ATRIAL RATE: 89 BPM
EKG P AXIS: 70 DEGREES
EKG P-R INTERVAL: 144 MS
EKG Q-T INTERVAL: 408 MS
EKG QRS DURATION: 100 MS
EKG QTC CALCULATION (BAZETT): 496 MS
EKG R AXIS: 80 DEGREES
EKG T AXIS: 69 DEGREES
EKG VENTRICULAR RATE: 89 BPM
EOSINOPHIL # BLD: 0.1 %
EOSINOPHILS ABSOLUTE: 0 THOU/MM3 (ref 0–0.4)
ERYTHROCYTE [DISTWIDTH] IN BLOOD BY AUTOMATED COUNT: 13 % (ref 11.5–14.5)
ERYTHROCYTE [DISTWIDTH] IN BLOOD BY AUTOMATED COUNT: 42 FL (ref 35–45)
GFR SERPL CREATININE-BSD FRML MDRD: 6 ML/MIN/1.73M2
GLUCOSE BLD-MCNC: 78 MG/DL (ref 70–108)
HCT VFR BLD CALC: 24.4 % (ref 37–47)
HEMOGLOBIN: 8.3 GM/DL (ref 12–16)
IMMATURE GRANS (ABS): 0.05 THOU/MM3 (ref 0–0.07)
IMMATURE GRANULOCYTES: 0.7 %
LYMPHOCYTES # BLD: 21.9 %
LYMPHOCYTES ABSOLUTE: 1.7 THOU/MM3 (ref 1–4.8)
MAGNESIUM: 1.5 MG/DL (ref 1.6–2.4)
MCH RBC QN AUTO: 30.5 PG (ref 26–33)
MCHC RBC AUTO-ENTMCNC: 34 GM/DL (ref 32.2–35.5)
MCV RBC AUTO: 89.7 FL (ref 81–99)
MONOCYTES # BLD: 6.8 %
MONOCYTES ABSOLUTE: 0.5 THOU/MM3 (ref 0.4–1.3)
NUCLEATED RED BLOOD CELLS: 0 /100 WBC
PHOSPHORUS: 6.3 MG/DL (ref 2.4–4.7)
PLATELET # BLD: 125 THOU/MM3 (ref 130–400)
PMV BLD AUTO: 11.1 FL (ref 9.4–12.4)
POTASSIUM REFLEX MAGNESIUM: 3.3 MEQ/L (ref 3.5–5.2)
POTASSIUM SERPL-SCNC: 3.3 MEQ/L (ref 3.5–5.2)
RBC # BLD: 2.72 MILL/MM3 (ref 4.2–5.4)
SEG NEUTROPHILS: 70 %
SEGMENTED NEUTROPHILS ABSOLUTE COUNT: 5.3 THOU/MM3 (ref 1.8–7.7)
SODIUM BLD-SCNC: 144 MEQ/L (ref 135–145)
THROAT/NOSE CULTURE: NORMAL
TOTAL PROTEIN: 5.5 G/DL (ref 6.1–8)
WBC # BLD: 7.6 THOU/MM3 (ref 4.8–10.8)

## 2022-12-18 PROCEDURE — 83735 ASSAY OF MAGNESIUM: CPT

## 2022-12-18 PROCEDURE — 6370000000 HC RX 637 (ALT 250 FOR IP): Performed by: PHYSICIAN ASSISTANT

## 2022-12-18 PROCEDURE — 36415 COLL VENOUS BLD VENIPUNCTURE: CPT

## 2022-12-18 PROCEDURE — 1200000003 HC TELEMETRY R&B

## 2022-12-18 PROCEDURE — 84100 ASSAY OF PHOSPHORUS: CPT

## 2022-12-18 PROCEDURE — 99233 SBSQ HOSP IP/OBS HIGH 50: CPT

## 2022-12-18 PROCEDURE — 6370000000 HC RX 637 (ALT 250 FOR IP): Performed by: INTERNAL MEDICINE

## 2022-12-18 PROCEDURE — 6360000002 HC RX W HCPCS: Performed by: PHYSICIAN ASSISTANT

## 2022-12-18 PROCEDURE — 6370000000 HC RX 637 (ALT 250 FOR IP)

## 2022-12-18 PROCEDURE — 82330 ASSAY OF CALCIUM: CPT

## 2022-12-18 PROCEDURE — 6360000002 HC RX W HCPCS

## 2022-12-18 PROCEDURE — 2580000003 HC RX 258

## 2022-12-18 PROCEDURE — 99221 1ST HOSP IP/OBS SF/LOW 40: CPT | Performed by: INTERNAL MEDICINE

## 2022-12-18 PROCEDURE — 80053 COMPREHEN METABOLIC PANEL: CPT

## 2022-12-18 PROCEDURE — 99232 SBSQ HOSP IP/OBS MODERATE 35: CPT | Performed by: INTERNAL MEDICINE

## 2022-12-18 PROCEDURE — 85025 COMPLETE CBC W/AUTO DIFF WBC: CPT

## 2022-12-18 PROCEDURE — 94669 MECHANICAL CHEST WALL OSCILL: CPT

## 2022-12-18 PROCEDURE — 93010 ELECTROCARDIOGRAM REPORT: CPT | Performed by: INTERNAL MEDICINE

## 2022-12-18 PROCEDURE — 2500000003 HC RX 250 WO HCPCS

## 2022-12-18 RX ORDER — CALCITRIOL 0.25 UG/1
0.25 CAPSULE, LIQUID FILLED ORAL
Status: DISCONTINUED | OUTPATIENT
Start: 2022-12-18 | End: 2022-12-30 | Stop reason: HOSPADM

## 2022-12-18 RX ORDER — CALCIUM GLUCONATE 20 MG/ML
2000 INJECTION, SOLUTION INTRAVENOUS ONCE
Status: COMPLETED | OUTPATIENT
Start: 2022-12-18 | End: 2022-12-18

## 2022-12-18 RX ORDER — LANOLIN ALCOHOL/MO/W.PET/CERES
400 CREAM (GRAM) TOPICAL DAILY
Status: DISCONTINUED | OUTPATIENT
Start: 2022-12-18 | End: 2022-12-19

## 2022-12-18 RX ORDER — BUMETANIDE 1 MG/1
2 TABLET ORAL ONCE
Status: COMPLETED | OUTPATIENT
Start: 2022-12-18 | End: 2022-12-18

## 2022-12-18 RX ORDER — LANOLIN ALCOHOL/MO/W.PET/CERES
400 CREAM (GRAM) TOPICAL ONCE
Status: COMPLETED | OUTPATIENT
Start: 2022-12-18 | End: 2022-12-18

## 2022-12-18 RX ORDER — CALCIUM GLUCONATE 20 MG/ML
2000 INJECTION, SOLUTION INTRAVENOUS ONCE
Status: COMPLETED | OUTPATIENT
Start: 2022-12-18 | End: 2022-12-19

## 2022-12-18 RX ADMIN — BUPROPION HYDROCHLORIDE 200 MG: 100 TABLET, FILM COATED, EXTENDED RELEASE ORAL at 19:51

## 2022-12-18 RX ADMIN — MONTELUKAST SODIUM 10 MG: 10 TABLET ORAL at 19:51

## 2022-12-18 RX ADMIN — Medication 400 MG: at 09:45

## 2022-12-18 RX ADMIN — SODIUM BICARBONATE: 84 INJECTION, SOLUTION INTRAVENOUS at 02:56

## 2022-12-18 RX ADMIN — BUPROPION HYDROCHLORIDE 200 MG: 100 TABLET, FILM COATED, EXTENDED RELEASE ORAL at 09:45

## 2022-12-18 RX ADMIN — CALCIUM GLUCONATE 2000 MG: 20 INJECTION, SOLUTION INTRAVENOUS at 09:50

## 2022-12-18 RX ADMIN — BUMETANIDE 2 MG: 1 TABLET ORAL at 15:48

## 2022-12-18 RX ADMIN — HEPARIN SODIUM 5000 UNITS: 5000 INJECTION INTRAVENOUS; SUBCUTANEOUS at 22:33

## 2022-12-18 RX ADMIN — ONDANSETRON 4 MG: 2 INJECTION INTRAMUSCULAR; INTRAVENOUS at 21:37

## 2022-12-18 RX ADMIN — SODIUM BICARBONATE: 84 INJECTION, SOLUTION INTRAVENOUS at 15:53

## 2022-12-18 RX ADMIN — FOLIC ACID 500 MCG: 1 TABLET ORAL at 09:45

## 2022-12-18 RX ADMIN — CALCIUM GLUCONATE 2000 MG: 20 INJECTION, SOLUTION INTRAVENOUS at 21:43

## 2022-12-18 RX ADMIN — TRAZODONE HYDROCHLORIDE 50 MG: 50 TABLET ORAL at 19:50

## 2022-12-18 RX ADMIN — HEPARIN SODIUM 5000 UNITS: 5000 INJECTION INTRAVENOUS; SUBCUTANEOUS at 06:28

## 2022-12-18 RX ADMIN — POLYETHYLENE GLYCOL 3350 17 G: 17 POWDER, FOR SOLUTION ORAL at 09:45

## 2022-12-18 RX ADMIN — CALCITRIOL CAPSULES 0.25 MCG 0.25 MCG: 0.25 CAPSULE ORAL at 15:48

## 2022-12-18 RX ADMIN — ATORVASTATIN CALCIUM 10 MG: 10 TABLET, FILM COATED ORAL at 09:45

## 2022-12-18 RX ADMIN — CLOPIDOGREL BISULFATE 75 MG: 75 TABLET ORAL at 09:45

## 2022-12-18 RX ADMIN — Medication 400 MG: at 21:39

## 2022-12-18 RX ADMIN — ASPIRIN 81 MG: 81 TABLET, COATED ORAL at 19:50

## 2022-12-18 RX ADMIN — POTASSIUM BICARBONATE 40 MEQ: 782 TABLET, EFFERVESCENT ORAL at 09:44

## 2022-12-18 RX ADMIN — AMLODIPINE BESYLATE 5 MG: 5 TABLET ORAL at 09:45

## 2022-12-18 RX ADMIN — DOCUSATE SODIUM 100 MG: 100 CAPSULE, LIQUID FILLED ORAL at 09:45

## 2022-12-18 ASSESSMENT — PAIN SCALES - GENERAL: PAINLEVEL_OUTOF10: 3

## 2022-12-18 ASSESSMENT — PAIN DESCRIPTION - LOCATION: LOCATION: ABDOMEN

## 2022-12-18 ASSESSMENT — PAIN DESCRIPTION - DESCRIPTORS: DESCRIPTORS: CRAMPING

## 2022-12-18 NOTE — PLAN OF CARE
Problem: Discharge Planning  Goal: Discharge to home or other facility with appropriate resources  Outcome: Progressing  Flowsheets (Taken 12/17/2022 2037)  Discharge to home or other facility with appropriate resources: Identify barriers to discharge with patient and caregiver     Problem: Pain  Goal: Verbalizes/displays adequate comfort level or baseline comfort level  Outcome: Progressing  Flowsheets (Taken 12/18/2022 0249)  Verbalizes/displays adequate comfort level or baseline comfort level: Encourage patient to monitor pain and request assistance     Problem: Skin/Tissue Integrity  Goal: Absence of new skin breakdown  Description: 1. Monitor for areas of redness and/or skin breakdown  2. Assess vascular access sites hourly  3. Every 4-6 hours minimum:  Change oxygen saturation probe site  4. Every 4-6 hours:  If on nasal continuous positive airway pressure, respiratory therapy assess nares and determine need for appliance change or resting period.   Outcome: Progressing     Problem: Safety - Adult  Goal: Free from fall injury  Outcome: Progressing  Flowsheets (Taken 12/17/2022 2117)  Free From Fall Injury: Instruct family/caregiver on patient safety     Problem: ABCDS Injury Assessment  Goal: Absence of physical injury  Outcome: Progressing  Flowsheets (Taken 12/17/2022 2117)  Absence of Physical Injury: Implement safety measures based on patient assessment     Problem: Genitourinary - Adult  Goal: Absence of urinary retention  Outcome: Progressing  Flowsheets (Taken 12/18/2022 0249)  Absence of urinary retention: Assess patients ability to void and empty bladder     Problem: Metabolic/Fluid and Electrolytes - Adult  Goal: Electrolytes maintained within normal limits  Outcome: Progressing  Flowsheets (Taken 12/17/2022 2037)  Electrolytes maintained within normal limits: Monitor labs and assess patient for signs and symptoms of electrolyte imbalances  Goal: Hemodynamic stability and optimal renal function maintained  Outcome: Progressing  Flowsheets (Taken 12/18/2022 0249)  Hemodynamic stability and optimal renal function maintained: Monitor labs and assess for signs and symptoms of volume excess or deficit     Problem: Chronic Conditions and Co-morbidities  Goal: Patient's chronic conditions and co-morbidity symptoms are monitored and maintained or improved  Outcome: Progressing  Flowsheets (Taken 12/17/2022 2037)  Care Plan - Patient's Chronic Conditions and Co-Morbidity Symptoms are Monitored and Maintained or Improved: Monitor and assess patient's chronic conditions and comorbid symptoms for stability, deterioration, or improvement   Care plan reviewed with patient. Patient verbalize understanding of the plan of care and contribute to goal setting.

## 2022-12-18 NOTE — PROGRESS NOTES
Hospitalist Progress Note    Patient: Jasmin Kaur      Unit/Bed:6K-06/006-A    YOB: 1955    MRN: [id number]       Acct: [de-identified]     PCP: Cherelle Santana MD    Date of Admission: 12/16/2022    Assessment/Plan:    ERUM on CKD stage IV:   Baseline creatinine range ~ 1.8-2. Creatinine slowly down trending from 8.2 (POA). Avoid nephrotoxic agents, renally dose medications. Obtain daily standing weight  Strict I&O's  Nephrology consulted and following appreciate recs    12/17: Cr 7.3, total 24 h  mL  12/18: Cr 7.3, total 24 h UOP 1800 mL    High AGMA, improving:   Likely due to ERUM/dehydration. On bicarb drip. Lactic acid 0.8. Nephrology following    Hemoptysis  Patient noted to have 3 episodes of hemoptysis with coughing. Nurse was able to send me a picture of the bloody sputum she coughed up. CT chest notable for minimal atelectatic/fibrotic stranding left lung base and lingula. Sputum culture ordered  Pulmonology consulted, appreciate recs: \"Hemoptysis appears to be mild, patient mentioned epistaxis, could be post infectious, CT chest showed no masses or alveolar hemorrhage. Continue Plavix, if hemoptysis persist will plan for diagnostic bronchoscopy for airway surveillance\"    Hypokalemia, ongoing:   Creatinine clearance < 5 mL/min. Not able to use standard potassium replacement protocol. Manually dose repletion  Daily EfferK 40 mEq. Daily lab    Hypocalcemia, ongoing:   Order for calcium gluconate. Calcium replacement protocol. Daily iCal    Hypernatremia:   Secondary to #1, nephrology following    Hypomagnesemia:   Creatinine clearance < 5 mL/min. Not able to use standard magnesium replacement protocol. Manually dose repletion  Daily Mag ox 400 mg  Daily magnesium level    Hyperphosphatemia:   Spoke with nephrology. No need for phosphorus binders at this time. D/t ERUM, should resolve as ERUM improves. Daily phosphorus level.      Elevated troponin  Was 0.033 (POA). Patient denies chest pains, ECG notable for normal sinus rhythm without ischemic changes,  ms . Suspect likely d/t cardiorenal syndrome given #1. Abdominal pain, nausea, vomiting:   Patient reports N/V, which may be from limited intake of food. Reports abdominal pain. Reports last BM was on Tuesday. States she is passing gas. Stool softners, as needed miralax   As needed antiemetics     12/18: Denies abd pain, N/V today. Had 2 medium solid BM's. URI:   CXR negative for pulmonary infiltrates or effusions. Patient has been on doxycycline started on 12/12. No evidence of bacterial infection, will hold off on further antibiotics. Supportive care. Acute on chronic macrocytic anemia:   Baseline hemoglobin range ~10-11. H&H 8.3/23. 8. Suspect likely dilutional component from IVF. H&H stable at this time. Transfuse for hemoglobin less than 7 g/dL or hemodynamic instability. Iron studies notable for ferritin 402, iron 145, iron sat 90, TIBC less than 162, folate normal, vitamin B12 greater than 2000  Trend and monitor CBC. Chronic HFpEF, compensated:   Echo (2/15/2022) notable for EF 55 to 60%, no WMA, G1 DD, mild tricuspid regurg. Continue ASA, statin, Plavix, amlodipine. Daily weights, strict I&O's. Cardiac diet, 2G sodium    Essential hypertension, controlled:   Continue amlodipine    Hyperlipidemia:   Statin    Depression/anxiety:   Continue Wellbutrin, trazodone    History of CVA:   Continue aspirin, Plavix, statin      Expected discharge date:  Pending clinical course    Disposition:    [x] Home       [] TCU       [] Rehab       [] Psych       [] SNF       [] [place]       [] Other-    Chief Complaint: Dehydration    Hospital Course: Per HPI documented 12/16/2022: Sarah Boggs Steve White is a 69 y.o. female with PMHx of CVA, depression, who presented to [facility] with chief complaint of dehydration, N/V.  Patient states that she was seen by her PCP on Monday, 12/12 for congestion, sore throat, fatigue, weakness on going for >2 weeks. She was started on PO doxycycline. She continues to feel congested but has no sinus pain or pressure, no cough, afebrile. Approx 2 days after starting doxy, patient developed N/V. Reports very poor PO intake, states she has only been eating ice chips. Reports lower abdominal pain, last BM 3 days ago. Reports decreased urine output, states it is very dark. Denies dysuria, urgency, frequency. No chest pain, SOB, f/c. Pt follows with Dr. Amanda Villalba for CKD. Admitted to med/tele for further management. \"    12/17/22: Resumed care of patient today. Patient afebrile, satting 94% on room air, with hemodynamically stable vital signs. Patient stating she feels weak and tired this morning. States that she still feels nauseous and when she ate some breakfast this morning she started dry heaving and almost vomited. States that she has very little appetite. Also reported some intermittent abdominal cramping. States she is passing gas. States her last bowel movement was Tuesday. Stool softeners added. Multiple electrolyte derangements as noted above. Creatinine 7.3 today, down from 8.2. Discussed case with Dr. Yojana Brooks nephrology, who recommend decreasing bicarb drip to rate of 100 mL/hr. Patient making urine 900 mL OP overnight. 12/18/22: Afebrile, hemodynamically stable. No acute events overnight. Patient stating she feels a little better this morning but still feels weak. States that she has not had any nausea or vomiting since yesterday. States she had 2 medium solid BMs and no longer has abdominal pain. States she ate all her breakfast.  Creatinine at 7.3 today, no improvement. Ionized calcium still remains low, will continue to replete. Potassium magnesium still low, will continue to replete. Continue IV bicarb drip.       Subjective (past 24 hours):      Denies chest pains, shortness of breath at rest, TUTTLE, palpitations, dizziness, lightheadedness, fever, chills. Medications:  Reviewed    Infusion Medications    sodium chloride      sodium bicarbonate infusion 100 mL/hr at 12/18/22 1553     Scheduled Medications    calcium replacement protocol   Other RX Placeholder    calcitRIOL  0.25 mcg Oral Once per day on Sun Tue Thu Sat    calcium gluconate  2,000 mg IntraVENous Once    magnesium oxide  400 mg Oral Daily    docusate sodium  100 mg Oral Daily    senna  1 tablet Oral Nightly    potassium bicarb-citric acid  40 mEq Oral Daily    amLODIPine  5 mg Oral Daily    aspirin  81 mg Oral Daily    buPROPion  200 mg Oral BID    clopidogrel  75 mg Oral Daily    folic acid  932 mcg Oral Daily    montelukast  10 mg Oral Daily    atorvastatin  10 mg Oral Daily    traZODone  50 mg Oral Nightly    polyethylene glycol  17 g Oral Daily    sodium chloride flush  10 mL IntraVENous 2 times per day    heparin (porcine)  5,000 Units SubCUTAneous 3 times per day     PRN Meds: chlordiazePOXIDE-clidinium, fluticasone, simethicone, sodium chloride flush, sodium chloride, ondansetron **OR** ondansetron, acetaminophen **OR** acetaminophen, dextromethorphan-guaiFENesin, cetirizine, hydrALAZINE      Intake/Output Summary (Last 24 hours) at 12/18/2022 2008  Last data filed at 12/18/2022 1605  Gross per 24 hour   Intake 2919.62 ml   Output 1600 ml   Net 1319.62 ml       Diet:  ADULT DIET; Regular; 4 carb choices (60 gm/meal); Low Fat/Low Chol/High Fiber/2 gm Na    Exam:  BP (!) 137/58   Pulse 79   Temp 98.2 °F (36.8 °C) (Oral)   Resp 16   Ht 5' (1.524 m)   Wt 117 lb 14.4 oz (53.5 kg)   SpO2 100%   BMI 23.03 kg/m²     General appearance: No apparent distress, appears stated age and cooperative. HEENT: Pupils equal, round, and reactive to light. Conjunctivae/corneas clear. Neck: Supple, with full range of motion. No jugular venous distention. Trachea midline. Respiratory:  Normal respiratory effort, able to speak full clear sentences. Clear to auscultation, bilaterally without Rales/Wheezes/Rhonchi. Cardiovascular: Regular rate and rhythm with normal S1/S2 without murmurs, rubs or gallops. Abdomen: Soft, non-tender, non-distended with normal bowel sounds. Musculoskeletal: passive and active ROM x 4 extremities. Skin: Skin color, texture, turgor normal.  No rashes or lesions. Neurologic:  Neurovascularly intact without any focal sensory/motor deficits. Cranial nerves: II-XII intact, grossly non-focal.  Psychiatric: Alert and oriented, thought content appropriate, normal insight  Capillary Refill: Brisk,< 3 seconds   Peripheral Pulses: +2 palpable, equal bilaterally       Labs:   Recent Labs     12/16/22  1100 12/17/22  0517 12/17/22  1200 12/18/22  0554   WBC 8.3 8.5  --  7.6   HGB 10.3* 8.3* 8.7* 8.3*   HCT 30.6* 23.8* 25.4* 24.4*    131  --  125*     Recent Labs     12/16/22  1100 12/17/22  0517 12/17/22  1340 12/18/22  0554    147*  --  144   K 3.8 3.1* 3.1* 3.3*  3.3*    108  --  104   CO2 13* 17*  --  21*   BUN 96* 90*  --  88*   CREATININE 8.2* 7.3*  --  7.3*   CALCIUM 6.6* 6.2*  --  6.6*   PHOS  --  7.4*  --  6.3*     Recent Labs     12/16/22  1100 12/17/22  0517 12/18/22  0554   AST 21 23 25   ALT 15 14 15   BILITOT 0.4 0.4 0.5   ALKPHOS 98 79 85     No results for input(s): INR in the last 72 hours. No results for input(s): Tobin Shorts in the last 72 hours. Microbiology:      Urinalysis:      Lab Results   Component Value Date/Time    NITRU NEGATIVE 12/16/2022 05:20 PM    WBCUA 10-15 12/16/2022 05:20 PM    BACTERIA NONE SEEN 12/16/2022 05:20 PM    RBCUA 5-10 12/16/2022 05:20 PM    BLOODU TRACE 12/16/2022 05:20 PM    SPECGRAV 1.010 12/16/2022 05:20 PM    GLUCOSEU Negative 07/16/2021 04:09 PM    GLUCOSEU NEGATIVE 06/09/2020 03:03 PM       Radiology:  XR CHEST (2 VW)    Result Date: 12/16/2022  PROCEDURE: XR CHEST (2 VW) CLINICAL INFORMATION: SOB, cough. COMPARISON: Chest x-ray dated 9/18/2021. . TECHNIQUE: PA and lateral views the chest. FINDINGS: The heart size is normal.  The mediastinum is not widened. There are no pulmonary infiltrates or effusions. The pulmonary vascularity is normal. There are postoperative changes in the lower cervical spine. .  There is mild thoracic spondylosis. 1. No interval change since previous study dated 9/18/2021. Austin Carmona **This report has been created using voice recognition software. It may contain minor errors which are inherent in voice recognition technology. ** Final report electronically signed by DR Mary Jo Shetty on 12/16/2022 11:36 AM      DVT prophylaxis: [] Lovenox                                 [] SCDs                                 [x] SQ Heparin                                 [] Encourage ambulation           [] Already on Anticoagulation     Code Status: Full Code    PT/OT Eval Status: Pending recs    Tele:   [x] yes             [] no    Normal sinus rhythm without ectopy.     Active Hospital Problems    Diagnosis Date Noted    ERUM (acute kidney injury) ([facility] [address] [N17.9] 12/18/2022     Priority: Medium    Dehydration [E86.0] 12/17/2022     Priority: Medium    Hypokalemia [E87.6] 12/17/2022     Priority: Medium    Hypomagnesemia [E83.42] 12/17/2022     Priority: Medium    Hyperphosphatemia [E83.39] 12/17/2022     Priority: Medium    Hypocalcemia [E83.51] 12/17/2022     Priority: Medium    Hemoptysis [R04.2] 12/17/2022     Priority: Medium    Acute kidney injury superimposed on chronic kidney disease ([facility] [address] [N17.9, N18.9] 12/16/2022     Priority: Medium    Metabolic acidosis, increased anion gap [E87.29] 12/16/2022     Priority: Medium       Electronically signed by CARLOS MANUEL Alexandre CNP on 12/18/2022 at 8:08 PM

## 2022-12-18 NOTE — PROGRESS NOTES
[facility]   [facility]   [address]   [place] [address]   [phone]   Nephrology Hospital progress note       12/18/2022, 1:06 PM        Pt Name:    Myron Vazquez  MRN:     [id number]     YOB: 1955  Admit Date:    12/16/2022 10:35 AM  Primary Care Physician:  Iman Garza MD   Primary Nephrologist:          Dr. Matute Gins number  [id number]    ISOLATION: none     Admitting Chief Complaint:   Nausea and vomiting     History of Chief Complaint:  69 YOWF was admitted not feeling well with nausea and vomiting. She was found to have acute kidney injury     Nephrology is treating:   Acute kidney injury     Subjective: The patient was sitting up in the bedside chair. She feels improved but weak. Her kidney function is not improving. Lab Results   Component Value Date/Time    LABGLOM 6 12/18/2022 05:54 AM    LABGLOM 6 12/17/2022 05:17 AM    LABGLOM 5 12/16/2022 11:00 AM    LABGLOM 28 09/29/2022 01:53 PM    LABGLOM 21 09/14/2022 11:24 AM    LABGLOM 26 07/13/2022 10:25 PM          Baseline eGFR    Admit eGFR    Current eGFR          Objective:    Diet: renal    VITALS:  /61   Pulse 85   Temp 98.1 °F (36.7 °C) (Oral)   Resp 18   Ht 5' (1.524 m)   Wt 117 lb 14.4 oz (53.5 kg)   SpO2 100%   BMI 23.03 kg/m²   24HR INTAKE/OUTPUT:    Intake/Output Summary (Last 24 hours) at 12/18/2022 1306  Last data filed at 12/18/2022 0900  Gross per 24 hour   Intake 3175.62 ml   Output 2200 ml   Net 975.62 ml       Admission weight: 114 lb (51.7 kg)  Wt Readings from Last 3 Encounters:   12/17/22 117 lb 14.4 oz (53.5 kg)   12/16/22 114 lb (51.7 kg)   12/12/22 116 lb (52.6 kg)     Body mass index is 23.03 kg/m². Physical examination    General:  Alert and cooperative with exam  HEENT:  Head: Normocephalic, no lesions, without obvious abnormality.   Neck:   no adenopathy, no carotid bruit, no JVD, supple, symmetrical, trachea midline, and thyroid not enlarged, symmetric, no tenderness/mass/nodules  Lungs:  clear to auscultation bilaterally  Heart:  regular rate and rhythm, S1, S2 normal, no murmur, click, rub or gallop  Abdomen:  soft, non-tender; bowel sounds normal; no masses,  no organomegaly  Extremities:  1+ edema   Neurologic:  Mental status: Alert, oriented, thought content appropriate  Psy:                 No evidence of depression. Mood is stable. Skin:                Warm and dry. No lesions or rashes noted. Muscles:         Hand  and leg strength are equal and strong bilaterally. Lab Data  CBC:   Recent Labs     12/16/22  1100 12/17/22  0517 12/17/22  1200 12/18/22  0554   WBC 8.3 8.5  --  7.6   HGB 10.3* 8.3* 8.7* 8.3*    131  --  125*       BMP:  Recent Labs     12/16/22  1100 12/17/22  0517 12/17/22  1340 12/18/22  0554    147*  --  144   K 3.8 3.1* 3.1* 3.3*  3.3*    108  --  104   CO2 13* 17*  --  21*   BUN 96* 90*  --  88*   CREATININE 8.2* 7.3*  --  7.3*   GLUCOSE 99 70  --  78   CALCIUM 6.6* 6.2*  --  6.6*   MG 1.7 1.5*  --  1.5*   PHOS  --  7.4*  --  6.3*       TSH: No results for input(s): TSH in the last 72 hours. HgBa1c: No results for input(s): LABA1C in the last 72 hours. Hepatic:   Recent Labs     12/16/22  1100 12/17/22  0517 12/18/22  0554   LABALBU 4.3 3.4* 3.3*   AST 21 23 25   ALT 15 14 15   BILITOT 0.4 0.4 0.5   ALKPHOS 98 79 85       Troponin: No results for input(s): TROPONINI in the last 72 hours. BNP: No results for input(s): BNP in the last 72 hours. Lipids: No results for input(s): CHOL, HDL in the last 72 hours. Invalid input(s): LDLCALCU  INR: No results for input(s): INR in the last 72 hours. Assessment:    Acute kidney injury-improving. High anion gap acidosis. CKD stage IV     Plan:    Continue IV hydration  One dose Bumex IV       John Verduzco. DO [facility].
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
## 2022-12-19 ENCOUNTER — TELEPHONE (OUTPATIENT)
Dept: PSYCHIATRY | Age: 67
End: 2022-12-19

## 2022-12-19 PROBLEM — R04.0 NOSEBLEED: Status: ACTIVE | Noted: 2022-12-19

## 2022-12-19 LAB
ALBUMIN SERPL-MCNC: 3 G/DL (ref 3.5–5.1)
ALP BLD-CCNC: 87 U/L (ref 38–126)
ALT SERPL-CCNC: 14 U/L (ref 11–66)
ANION GAP SERPL CALCULATED.3IONS-SCNC: 15 MEQ/L (ref 8–16)
AST SERPL-CCNC: 23 U/L (ref 5–40)
BASOPHILS # BLD: 0.6 %
BASOPHILS ABSOLUTE: 0 THOU/MM3 (ref 0–0.1)
BILIRUB SERPL-MCNC: 0.5 MG/DL (ref 0.3–1.2)
BUN BLDV-MCNC: 86 MG/DL (ref 7–22)
CALCIUM IONIZED: 0.83 MMOL/L (ref 1.12–1.32)
CALCIUM IONIZED: 0.89 MMOL/L (ref 1.12–1.32)
CALCIUM SERPL-MCNC: 7.4 MG/DL (ref 8.5–10.5)
CHLORIDE BLD-SCNC: 101 MEQ/L (ref 98–111)
CO2: 30 MEQ/L (ref 23–33)
CREAT SERPL-MCNC: 7.1 MG/DL (ref 0.4–1.2)
CREATININE URINE: 23.8 MG/DL
ELLIPTOCYTES: ABNORMAL
EOSINOPHIL # BLD: 0.3 %
EOSINOPHILS ABSOLUTE: 0 THOU/MM3 (ref 0–0.4)
ERYTHROCYTE [DISTWIDTH] IN BLOOD BY AUTOMATED COUNT: 12.7 % (ref 11.5–14.5)
ERYTHROCYTE [DISTWIDTH] IN BLOOD BY AUTOMATED COUNT: 42.6 FL (ref 35–45)
GFR SERPL CREATININE-BSD FRML MDRD: 6 ML/MIN/1.73M2
GLUCOSE BLD-MCNC: 84 MG/DL (ref 70–108)
HCT VFR BLD CALC: 23.2 % (ref 37–47)
HCT VFR BLD CALC: 24.8 % (ref 37–47)
HEMOGLOBIN: 7.7 GM/DL (ref 12–16)
HEMOGLOBIN: 8.3 GM/DL (ref 12–16)
HEPATITIS C ANTIBODY: NEGATIVE
IMMATURE GRANS (ABS): 0.06 THOU/MM3 (ref 0–0.07)
IMMATURE GRANULOCYTES: 0.9 %
LYMPHOCYTES # BLD: 33.6 %
LYMPHOCYTES ABSOLUTE: 2.3 THOU/MM3 (ref 1–4.8)
MAGNESIUM: 1.5 MG/DL (ref 1.6–2.4)
MCH RBC QN AUTO: 30.7 PG (ref 26–33)
MCHC RBC AUTO-ENTMCNC: 33.2 GM/DL (ref 32.2–35.5)
MCV RBC AUTO: 92.4 FL (ref 81–99)
MONOCYTES # BLD: 10.3 %
MONOCYTES ABSOLUTE: 0.7 THOU/MM3 (ref 0.4–1.3)
NUCLEATED RED BLOOD CELLS: 0 /100 WBC
ORGANISM: ABNORMAL
PHOSPHORUS: 6.1 MG/DL (ref 2.4–4.7)
PLATELET # BLD: 97 THOU/MM3 (ref 130–400)
PLATELET ESTIMATE: ABNORMAL
PMV BLD AUTO: 11.6 FL (ref 9.4–12.4)
POTASSIUM REFLEX MAGNESIUM: 3.9 MEQ/L (ref 3.5–5.2)
POTASSIUM SERPL-SCNC: 3.9 MEQ/L (ref 3.5–5.2)
PROT/CREAT RATIO, UR: 0.37
PROTEIN, URINE: 8.7 MG/DL
RBC # BLD: 2.51 MILL/MM3 (ref 4.2–5.4)
SCAN OF BLOOD SMEAR: NORMAL
SEG NEUTROPHILS: 54.3 %
SEGMENTED NEUTROPHILS ABSOLUTE COUNT: 3.7 THOU/MM3 (ref 1.8–7.7)
SODIUM BLD-SCNC: 146 MEQ/L (ref 135–145)
TOTAL PROTEIN: 4.9 G/DL (ref 6.1–8)
URINE CULTURE, ROUTINE: ABNORMAL
WBC # BLD: 6.9 THOU/MM3 (ref 4.8–10.8)

## 2022-12-19 PROCEDURE — 2500000003 HC RX 250 WO HCPCS

## 2022-12-19 PROCEDURE — 83883 ASSAY NEPHELOMETRY NOT SPEC: CPT

## 2022-12-19 PROCEDURE — 97116 GAIT TRAINING THERAPY: CPT

## 2022-12-19 PROCEDURE — 80053 COMPREHEN METABOLIC PANEL: CPT

## 2022-12-19 PROCEDURE — 86160 COMPLEMENT ANTIGEN: CPT

## 2022-12-19 PROCEDURE — 86255 FLUORESCENT ANTIBODY SCREEN: CPT

## 2022-12-19 PROCEDURE — 6360000002 HC RX W HCPCS: Performed by: INTERNAL MEDICINE

## 2022-12-19 PROCEDURE — 85018 HEMOGLOBIN: CPT

## 2022-12-19 PROCEDURE — 97530 THERAPEUTIC ACTIVITIES: CPT

## 2022-12-19 PROCEDURE — 36415 COLL VENOUS BLD VENIPUNCTURE: CPT

## 2022-12-19 PROCEDURE — 82570 ASSAY OF URINE CREATININE: CPT

## 2022-12-19 PROCEDURE — 6370000000 HC RX 637 (ALT 250 FOR IP): Performed by: PHYSICIAN ASSISTANT

## 2022-12-19 PROCEDURE — 83516 IMMUNOASSAY NONANTIBODY: CPT

## 2022-12-19 PROCEDURE — 99233 SBSQ HOSP IP/OBS HIGH 50: CPT

## 2022-12-19 PROCEDURE — 85014 HEMATOCRIT: CPT

## 2022-12-19 PROCEDURE — 6370000000 HC RX 637 (ALT 250 FOR IP)

## 2022-12-19 PROCEDURE — 82330 ASSAY OF CALCIUM: CPT

## 2022-12-19 PROCEDURE — 84100 ASSAY OF PHOSPHORUS: CPT

## 2022-12-19 PROCEDURE — 83735 ASSAY OF MAGNESIUM: CPT

## 2022-12-19 PROCEDURE — 89190 NASAL SMEAR FOR EOSINOPHILS: CPT

## 2022-12-19 PROCEDURE — 84156 ASSAY OF PROTEIN URINE: CPT

## 2022-12-19 PROCEDURE — 86038 ANTINUCLEAR ANTIBODIES: CPT

## 2022-12-19 PROCEDURE — 99233 SBSQ HOSP IP/OBS HIGH 50: CPT | Performed by: INTERNAL MEDICINE

## 2022-12-19 PROCEDURE — 86334 IMMUNOFIX E-PHORESIS SERUM: CPT

## 2022-12-19 PROCEDURE — 97162 PT EVAL MOD COMPLEX 30 MIN: CPT

## 2022-12-19 PROCEDURE — 82272 OCCULT BLD FECES 1-3 TESTS: CPT

## 2022-12-19 PROCEDURE — 86803 HEPATITIS C AB TEST: CPT

## 2022-12-19 PROCEDURE — 85025 COMPLETE CBC W/AUTO DIFF WBC: CPT

## 2022-12-19 PROCEDURE — 6360000002 HC RX W HCPCS

## 2022-12-19 PROCEDURE — 82043 UR ALBUMIN QUANTITATIVE: CPT

## 2022-12-19 PROCEDURE — 2580000003 HC RX 258

## 2022-12-19 PROCEDURE — 2580000003 HC RX 258: Performed by: PHYSICIAN ASSISTANT

## 2022-12-19 PROCEDURE — 2580000003 HC RX 258: Performed by: INTERNAL MEDICINE

## 2022-12-19 PROCEDURE — 1200000003 HC TELEMETRY R&B

## 2022-12-19 PROCEDURE — 6360000002 HC RX W HCPCS: Performed by: PHYSICIAN ASSISTANT

## 2022-12-19 RX ORDER — LANOLIN ALCOHOL/MO/W.PET/CERES
400 CREAM (GRAM) TOPICAL 2 TIMES DAILY
Status: DISCONTINUED | OUTPATIENT
Start: 2022-12-19 | End: 2022-12-30 | Stop reason: HOSPADM

## 2022-12-19 RX ORDER — MAGNESIUM SULFATE 1 G/100ML
1000 INJECTION INTRAVENOUS ONCE
Status: COMPLETED | OUTPATIENT
Start: 2022-12-19 | End: 2022-12-19

## 2022-12-19 RX ORDER — SODIUM CHLORIDE 450 MG/100ML
INJECTION, SOLUTION INTRAVENOUS CONTINUOUS
Status: DISCONTINUED | OUTPATIENT
Start: 2022-12-19 | End: 2022-12-30 | Stop reason: HOSPADM

## 2022-12-19 RX ORDER — CALCIUM GLUCONATE 20 MG/ML
2000 INJECTION, SOLUTION INTRAVENOUS ONCE
Status: COMPLETED | OUTPATIENT
Start: 2022-12-19 | End: 2022-12-19

## 2022-12-19 RX ADMIN — SENNOSIDES 8.6 MG: 8.6 TABLET, COATED ORAL at 19:45

## 2022-12-19 RX ADMIN — BUPROPION HYDROCHLORIDE 200 MG: 100 TABLET, FILM COATED, EXTENDED RELEASE ORAL at 08:19

## 2022-12-19 RX ADMIN — MONTELUKAST SODIUM 10 MG: 10 TABLET ORAL at 19:45

## 2022-12-19 RX ADMIN — SODIUM CHLORIDE, PRESERVATIVE FREE 10 ML: 5 INJECTION INTRAVENOUS at 19:45

## 2022-12-19 RX ADMIN — HEPARIN SODIUM 5000 UNITS: 5000 INJECTION INTRAVENOUS; SUBCUTANEOUS at 22:24

## 2022-12-19 RX ADMIN — ONDANSETRON 4 MG: 2 INJECTION INTRAMUSCULAR; INTRAVENOUS at 22:23

## 2022-12-19 RX ADMIN — FOLIC ACID 500 MCG: 1 TABLET ORAL at 08:19

## 2022-12-19 RX ADMIN — HEPARIN SODIUM 5000 UNITS: 5000 INJECTION INTRAVENOUS; SUBCUTANEOUS at 06:34

## 2022-12-19 RX ADMIN — BUPROPION HYDROCHLORIDE 200 MG: 100 TABLET, FILM COATED, EXTENDED RELEASE ORAL at 19:45

## 2022-12-19 RX ADMIN — SODIUM CHLORIDE, PRESERVATIVE FREE 10 ML: 5 INJECTION INTRAVENOUS at 08:14

## 2022-12-19 RX ADMIN — POTASSIUM BICARBONATE 40 MEQ: 782 TABLET, EFFERVESCENT ORAL at 08:20

## 2022-12-19 RX ADMIN — SODIUM BICARBONATE: 84 INJECTION, SOLUTION INTRAVENOUS at 14:33

## 2022-12-19 RX ADMIN — MAGNESIUM SULFATE HEPTAHYDRATE 1000 MG: 1 INJECTION, SOLUTION INTRAVENOUS at 16:30

## 2022-12-19 RX ADMIN — TRAZODONE HYDROCHLORIDE 50 MG: 50 TABLET ORAL at 19:45

## 2022-12-19 RX ADMIN — SODIUM BICARBONATE: 84 INJECTION, SOLUTION INTRAVENOUS at 02:59

## 2022-12-19 RX ADMIN — Medication 400 MG: at 19:45

## 2022-12-19 RX ADMIN — CALCIUM GLUCONATE 2000 MG: 20 INJECTION, SOLUTION INTRAVENOUS at 10:08

## 2022-12-19 RX ADMIN — ONDANSETRON 4 MG: 2 INJECTION INTRAMUSCULAR; INTRAVENOUS at 11:00

## 2022-12-19 RX ADMIN — FLUTICASONE PROPIONATE 1 SPRAY: 50 SPRAY, METERED NASAL at 16:35

## 2022-12-19 RX ADMIN — AMLODIPINE BESYLATE 5 MG: 5 TABLET ORAL at 08:19

## 2022-12-19 RX ADMIN — SODIUM CHLORIDE: 4.5 INJECTION, SOLUTION INTRAVENOUS at 16:26

## 2022-12-19 RX ADMIN — Medication 400 MG: at 08:21

## 2022-12-19 RX ADMIN — ATORVASTATIN CALCIUM 10 MG: 10 TABLET, FILM COATED ORAL at 08:19

## 2022-12-19 RX ADMIN — CLOPIDOGREL BISULFATE 75 MG: 75 TABLET ORAL at 08:19

## 2022-12-19 RX ADMIN — HEPARIN SODIUM 5000 UNITS: 5000 INJECTION INTRAVENOUS; SUBCUTANEOUS at 14:33

## 2022-12-19 RX ADMIN — ASPIRIN 81 MG: 81 TABLET, COATED ORAL at 19:45

## 2022-12-19 NOTE — PLAN OF CARE
Problem: Discharge Planning  Goal: Discharge to home or other facility with appropriate resources  Outcome: Progressing     Problem: Pain  Goal: Verbalizes/displays adequate comfort level or baseline comfort level  Outcome: Progressing     Problem: Skin/Tissue Integrity  Goal: Absence of new skin breakdown  Description: 1. Monitor for areas of redness and/or skin breakdown  2. Assess vascular access sites hourly  3. Every 4-6 hours minimum:  Change oxygen saturation probe site  4. Every 4-6 hours:  If on nasal continuous positive airway pressure, respiratory therapy assess nares and determine need for appliance change or resting period. Outcome: Progressing     Problem: Safety - Adult  Goal: Free from fall injury  Outcome: Progressing  Note: Bed in lowest position, call light within reach, bed alarm on.      Problem: ABCDS Injury Assessment  Goal: Absence of physical injury  Outcome: Progressing     Problem: Genitourinary - Adult  Goal: Absence of urinary retention  Outcome: Progressing     Problem: Metabolic/Fluid and Electrolytes - Adult  Goal: Electrolytes maintained within normal limits  Outcome: Progressing  Note: Replacing calcium     Problem: Metabolic/Fluid and Electrolytes - Adult  Goal: Hemodynamic stability and optimal renal function maintained  Outcome: Progressing     Problem: Chronic Conditions and Co-morbidities  Goal: Patient's chronic conditions and co-morbidity symptoms are monitored and maintained or improved  Outcome: Progressing

## 2022-12-19 NOTE — FLOWSHEET NOTE
12/19/22 1013   Safe Environment   Safety Measures Other (comment)  (Virtual nurse round complete)   Virtual nurse introduced via audio. Patient permits camera. Patient sitting up in chair, awake and alert. Visitor at bedside. Patient denies any needs at this time. Call light is within reach.

## 2022-12-19 NOTE — CARE COORDINATION
Case Management Assessment  Initial Evaluation    Date/Time of Evaluation: 12/19/2022 1:50 PM  Assessment Completed by: Rafael Loja RN    If patient is discharged prior to next notation, then this note serves as note for discharge by case management. Patient Name: Mikel Narayan                   YOB: 1955  Diagnosis: Dehydration [E86.0]  ERUM (acute kidney injury) ([facility] [address] [N17.9]  Acute renal failure superimposed on chronic kidney disease, unspecified CKD stage, unspecified acute renal failure type ([address] [N17.9, N18.9]  Nausea and vomiting, unspecified vomiting type [R11.2]                   Date / Time: 12/16/2022 10:35 AM  Location: [address] [place] [address]     Patient Admission Status: Inpatient   Readmission Risk (Low < 19, Mod (19-27), High > 27): Readmission Risk Score: 18.6    Current PCP: Narciso Medellin MD  PCP verified by CM? Yes    Chart Reviewed: Yes      History Provided by: Patient  Patient Orientation: Alert and Oriented    Patient Cognition: Alert    Hospitalization in the last 30 days (Readmission):  No    If yes, Readmission Assessment in CM Navigator will be completed. Advance Directives:      Code Status: Full Code   Patient's Primary Decision Maker is: Named in Scanned ACP Document    Primary Decision Maker: Dee Pardo Spouse - [phone]    Discharge Planning:    Patient lives with: Spouse/Significant Other Type of Home: House  Primary Care Giver: Self  Patient Support Systems include: Spouse/Significant Other   Current Financial resources: Medicare  Current community resources: None  Current services prior to admission: Durable Medical Equipment            Current DME: Walker            Type of Home Care services:  None    ADLS  Prior functional level: Assistance with the following:, Housework, Shopping  Current functional level: Assistance with the following:, Shopping, [address], Mobility    Family can provide assistance at DC:  Yes  Would you like Case Management to discuss the discharge plan with any other family members/significant others, and if so, who? No  Plans to Return to Present Housing: Yes  Other Identified Issues/Barriers to RETURNING to current housing: none  Potential Assistance needed at discharge: Outpatient PT/OT (previously has had therapy at [facility]. Feels she may need again if weakness doesn't improve.)            Potential DME:    Patient expects to discharge to: [address] [place] [address] for transportation at discharge: Family ()    Financial    Payor: MEDICARE / Plan: MEDICARE PART A AND B / Product Type: *No Product type* /     Does insurance require precert for SNF: No    Potential assistance Purchasing Medications: No  Meds-to-Beds request: Yes      [facility] [id number] - [place], [address] [address]  Phone: [phone] Fax: [phone]      Notes:    Factors facilitating achievement of predicted outcomes: Family support, Cooperative, Pleasant, and Good insight into deficits    Barriers to discharge: weakness    Additional Case Management Notes: creat 7.3 (baseline 1.8-2), Ical 0.83, K+ 3.3, Mg 1.5, phos 6.3. Bicarb gtt, lyte replacement. Acapella. PT/OT. Nephrology and pulm following (episode of epistaxis/hemoptysis, no plans for intervention). The Plan for Transition of Care is related to the following treatment goals of Dehydration [E86.0]  ERUM (acute kidney injury) ([facility] [address] [N17.9]  Acute renal failure superimposed on chronic kidney disease, unspecified CKD stage, unspecified acute renal failure type ([address] [N17.9, N18.9]  Nausea and vomiting, unspecified vomiting type [R11.2]    Patient Goals/Plan/Treatment Preferences: Met with Marlene Edwards, she is from home with her . She completed physical therapy at [facility] one month ago, and feels she will need OP PT/OT again at discharge if her weakness isn't improved. Will follow. Transportation/Food Security/Housekeeping Addressed: No issues identified. Rae Winters RN  Case Management Department

## 2022-12-19 NOTE — FLOWSHEET NOTE
12/19/22 9011   Safe Environment   Safety Measures Other (comment)  (Virtual nurse round complete)   Virtual nurse introduced via audio. Patient permits camera. Patient in bed awake and alert. Dinner tray on bedside table. Staff in room. Patient states she doesn't need anything at the moment and that \"everyone has been great here\".

## 2022-12-19 NOTE — PROGRESS NOTES
Comprehensive Nutrition Assessment    Type and Reason for Visit:  Initial, Consult (poor intake/appetite for 5 or more days)    Nutrition Recommendations/Plan:   Modified diet order to include a protein restriction due to CKD IV and not yet on dialysis. ONS ordered: Ensure compact BID   Provided and reviewed renal nutrition recommendations with patient and . Monitor nutrition related lab values, PO intake, weights, medications, GI status and provide further nutrition recommendations as necessary. Malnutrition Assessment:  Malnutrition Status: At risk for malnutrition (Comment) (12/19/22 1053)    Context:  Acute Illness     Findings of the 6 clinical characteristics of malnutrition:  Energy Intake:  50% or less of estimated energy requirements for 5 or more days  Weight Loss:  No significant weight loss     Body Fat Loss:  No significant body fat loss     Muscle Mass Loss:  No significant muscle mass loss    Fluid Accumulation:  No significant fluid accumulation     Strength:  Not Performed    Nutrition Assessment:    Pt. nutritionally compromised AEB decreased oral intake x1 week. At risk for further nutrition compromise r/t ERUM on CKD IV, hemoptysis, abdominal pain with nausea and underlying medical condition (PMH: CVA, depression, fibromyalgia, HLD, HTN, IBS, CKD IV). Nutrition Related Findings:    Pt. Report/Treatments/Miscellaneous: possible plan for bronch. pt reports she is still a little nauseated on assessment. Pt reports  lbs and reports was eating normal prior to becoming sick. Pt reports she tries to follow her renal diet but has not been lately~ requested further education to refresh.  Pt is asking if she can have a \"breakfast roll\" referring to a pastry~ discussed importance of all foods in moderation   GI Status: no BM documented this admit but per internal medicine doctor ~ 2 medium solid BMs  Pertinent Labs: Na 146, K 3.9, BUN 86, creatinine 7.1, magnesium 1.5, phos 6.1, hgb 7.7, glucose 84  Pertinent Meds: bumex, folic acid, magnesium oxide, senna, plavix, zofran    Wound Type: None       Current Nutrition Intake & Therapies:    Average Meal Intake: 26-50%  Average Supplements Intake:  (to start)  ADULT ORAL NUTRITION SUPPLEMENT; Breakfast, Dinner; Standard 4 oz Oral Supplement  ADULT DIET; Regular; 4 carb choices (60 gm/meal); Low Fat/Low Chol/High Fiber/2 gm Na; Less than 60 gm    Anthropometric Measures:  Height: 5' (152.4 cm)  Ideal Body Weight (IBW): 100 lbs (45 kg)    Admission Body Weight: 117 lb 14.4 oz (53.5 kg) (12/17)  Current Body Weight: 123 lb 10.9 oz (56.1 kg) (12/19: no edema),   IBW.  Weight Source: Not Specified  Current BMI (kg/m2): 24.2  Usual Body Weight:  (wt hx per EMR: 7/14/22: 119 lbs 12.8 oz, 9/19/22: 121 lbs 3.2 oz)                       BMI Categories: Normal Weight (BMI 22.0 to 24.9) age over 72    Estimated Daily Nutrient Needs:  Energy Requirements Based On: Kcal/kg  Weight Used for Energy Requirements: Current  Energy (kcal/day): 2606-6251 kcals/day (25-30 kcals/kg)  Weight Used for Protein Requirements: Current  Protein (g/day): 34-45 g/day (0.6-0.8 g/kg ABW) (decreased protein needs d/t renal function)     Fluid (ml/day):  5031-2429 ml/day (1 ml/kcal)    Nutrition Diagnosis:   Altered nutrition-related lab values related to renal dysfunction as evidenced by lab values    Nutrition Interventions:   Food and/or Nutrient Delivery: Continue Current Diet, Start Oral Nutrition Supplement  Nutrition Education/Counseling: Education initiated (discussed renal nutrition recommendations: provided handout and reviewed with patient, encouraged PO intake at best efforts)  Coordination of Nutrition Care: Continue to monitor while inpatient, Interdisciplinary Rounds       Goals:     Goals: PO intake 75% or greater, prior to discharge       Nutrition Monitoring and Evaluation:      Food/Nutrient Intake Outcomes: Diet Advancement/Tolerance, Food and Nutrient Intake, Supplement Intake  Physical Signs/Symptoms Outcomes: Biochemical Data, GI Status, Nausea or Vomiting, Weight, Skin, Nutrition Focused Physical Findings, Meal Time Behavior    Discharge Planning:     Too soon to determine     Karey Gregorio RD  Contact: [phone]

## 2022-12-19 NOTE — PROGRESS NOTES
[facility]  INPATIENT PHYSICAL THERAPY  EVALUATION  STRZ RENAL TELEMETRY 6K - 6K-06/006-A    Time In: 2611  Time Out: 9793  Timed Code Treatment Minutes: 25 Minutes  Minutes: 53     Minute Variance  Variance: 20 due to hospitalist in room    Date: 2022  Patient Name: Preet Holliday,  Gender:  female        MRN: [id number]  : 1955  (69 y.o.)      Referring Practitioner: CARLOS MANUEL Murdock CNP  Diagnosis: dehydration  Additional Pertinent Hx: Per EMR Alexandria Bueno Angel Padilla is a 69 y.o. female who presents to the emergency department for evaluation of, vomiting  Patient presents to the emergency department after being seen in the office because of 4 days of multiple episodes of nausea, emesis, not tolerating oral intake, lower abdominal pain, fatigue, feeling dehydrated; patient was diagnosed with sinusitis being prescribed with doxycycline some days ago, symptoms referred at that time were rhinorrhea, cough, mild shortness of breath; symptoms did not improve and were joint with nausea emesis and abdominal pain. Symptoms low urinary output, dark urine. Was concerned about kidney failure due to history of CKD. \"     Restrictions/Precautions:  Restrictions/Precautions: General Precautions, Fall Risk  Position Activity Restriction  Other position/activity restrictions: tremors    Subjective:  Chart Reviewed: Yes  Patient assessed for rehabilitation services?: Yes  Family / Caregiver Present: Yes ()  Subjective: OK to see pt per nursing. Pt in bed when PT arrived, pleasant and agreeable to PT session. Pt willing to complete mobility.     General:  Overall Orientation Status: Within Normal Limits  Orientation Level: Oriented X4  Vision: Impaired  Vision Exceptions: Wears glasses at all times  Hearing: Within functional limits       Pain: denies    Vitals: Vitals not assessed per clinical judgement, see nursing flowsheet    Social/Functional History:    Lives With: Spouse  Type of Home: House  Home Layout: One level  Home Access: Stairs to enter without rails  Entrance Stairs - Number of Steps: 2 MARIANA  Home Equipment: Willy Lukasz, Rollator     Bathroom Shower/Tub: Walk-in shower  Bathroom Toilet: Standard  Bathroom Equipment: Shower chair  IADL Comments:  completes most cooking and cleaning tasks       ADL Assistance: Independent  Homemaking Assistance: Needs assistance  Ambulation Assistance: Independent  Transfer Assistance: Independent    Active : Yes (short distances)  Occupation: Retired  Additional Comments: pt was intermittently using a walker prior to feeling weak and sick, has been using it more in the last 2 weeks. OBJECTIVE:  Range of Motion:  Bilateral Lower Extremity: WNL    Strength:  Bilateral Lower Extremity: Impaired - grossly deconditioned    Balance:  Static Sitting Balance:  Supervision  Dynamic Sitting Balance: Supervision, Stand By Assistance  Static Standing Balance: Stand By Assistance, Contact Guard Assistance  Dynamic Standing Balance: Contact Guard Assistance  Pt completed toileting, able to complete self ortiz care, and hand washing, no LOB noted  Bed Mobility:  Supine to Sit: Stand By Assistance, X 1, with head of bed raised, with rail    Transfers:  Sit to Stand: [employer], X 1, cues for hand placement, with verbal cues  Stand to Sit:Contact Guard Assistance, X 1, cues for hand placement, with verbal cues  RW for support for safety, completed transfers from various surfaces and heights during session  Ambulation:  Contact Guard Assistance, X 1, with cues for safety, with verbal cues , with increased time for completion  Distance: 15 feet, 80 feet  Surface: Level Tile  Device:Rolling Walker  Gait Deviations:   Forward Flexed Posture, Slow Anna, Decreased Step Length Bilaterally, and Decreased Gait Speed, tremor like sensations  Cues for safety, no LOB noted  Exercise:  Patient was guided in 1 set(s) 10 reps of exercise to both lower extremities. Ankle pumps, Glut sets, Quad sets, Heelslides, Hip abduction/adduction, and Straight leg raises. Exercises were completed for increased independence with functional mobility. Pt required VC and visual cues for proper technique of exercises with good demo. Functional Outcome Measures: Completed  AM-PAC Inpatient Mobility Raw Score : 18  AM-PAC Inpatient T-Scale Score : 43.63    ASSESSMENT:  Activity Tolerance:  Patient tolerance of  treatment: fair. Treatment Initiated: Treatment and education initiated within context of evaluation. Evaluation time included review of current medical information, gathering information related to past medical, social and functional history, completion of standardized testing, formal and informal observation of tasks, assessment of data and development of plan of care and goals. Treatment time included skilled education and facilitation of tasks to increase safety and independence with functional mobility for improved independence and quality of life. Assessment: Body Structures, Functions, Activity Limitations Requiring Skilled Therapeutic Intervention: Decreased functional mobility , Decreased endurance, Decreased balance, Decreased posture, Decreased strength, Decreased safe awareness, Decreased coordination  Assessment: Miriam Peters is a 69 y.o. female who presents with the deficits stated previously. Pt requires 1 person assist for functional tasks with use of walker for support. Pt cont to require skilled PT services to increase IND with functional tasks and progress towards PLOF to return to home environment safely. Therapy Prognosis: Good    Requires PT Follow-Up: Yes    Discharge Recommendations:  Discharge Recommendations: Continue to assess pending progress, Patient would benefit from continued therapy after discharge  Outpatient PT services vs [facility] services    Patient Education:      .     Patient Education  Education Given To: Patient, Family  Education Provided: Role of Therapy, Plan of Care, Equipment  Education Method: Verbal  Education Outcome: Verbalized understanding, Demonstrated understanding, Continued education needed       Equipment Recommendations:  Equipment Needed: No    Plan:  Current Treatment Recommendations: Strengthening, Balance training, Gait training, Stair training, Functional mobility training, Transfer training, Neuromuscular re-education, Endurance training, Equipment evaluation, education, & procurement, Patient/Caregiver education & training, Safety education & training, Therapeutic activities, Home exercise program  General Plan:  (5x GM)    Goals:  Patient Goals : therapy services HH vs outpatient  Short Term Goals  Time Frame for Short Term Goals: by discharge  Short Term Goal 1: Pt will amb for >100 feet with S with RW for support to return home safely. Short Term Goal 2: Pt will demo sit to/from stand transfers with RW for support with IND to return home safely. Short Term Goal 3: Pt will demo SBA for stair negotiation with rail for support as needed to return home safely. Short Term Goal 4: Pt will demo IND with bed mobility tasks with bed flat to return home safely. Long Term Goals  Time Frame for Long Term Goals : NA due to short ELOS    Following session, patient left in safe position with all fall risk precautions in place. Pt in bedside chair following session, all needs and call light in reach, alarm on.

## 2022-12-19 NOTE — TELEPHONE ENCOUNTER
Hill Dixon called into the office stating that she had cancelled her last appt because she was ill; she said that she is now in the hospital due to her kidneys and wanted to her this provider know. She is scheduled at the beginning of Feb 2023.

## 2022-12-19 NOTE — PLAN OF CARE
Problem: Discharge Planning  Goal: Discharge to home or other facility with appropriate resources  12/19/2022 1209 by Antonieta Fuchs RN  Outcome: Progressing  Flowsheets (Taken 12/19/2022 1246)  Discharge to home or other facility with appropriate resources: Identify barriers to discharge with patient and caregiver  12/18/2022 2228 by Kale Conrad RN  Outcome: Progressing     Problem: Pain  Goal: Verbalizes/displays adequate comfort level or baseline comfort level  12/19/2022 1209 by Antonieta Fuchs RN  Outcome: Progressing  Flowsheets (Taken 12/18/2022 0249)  Verbalizes/displays adequate comfort level or baseline comfort level: Encourage patient to monitor pain and request assistance  12/18/2022 2228 by Kale Conrad RN  Outcome: Progressing     Problem: Skin/Tissue Integrity  Goal: Absence of new skin breakdown  Description: 1. Monitor for areas of redness and/or skin breakdown  2. Assess vascular access sites hourly  3. Every 4-6 hours minimum:  Change oxygen saturation probe site  4. Every 4-6 hours:  If on nasal continuous positive airway pressure, respiratory therapy assess nares and determine need for appliance change or resting period. 12/19/2022 1209 by Antonieta Fuchs RN  Outcome: Progressing  12/18/2022 2228 by Kale Conrad RN  Outcome: Progressing     Problem: Safety - Adult  Goal: Free from fall injury  12/19/2022 1209 by Antonieta Fuchs RN  Outcome: Progressing  [facility] [address] [facility] (Taken 12/17/2022 2117)  Free From Fall Injury: Instruct family/caregiver on patient safety  12/18/2022 2228 by Kale Conrad RN  Outcome: Progressing  Note: Bed in lowest position, call light within reach, bed alarm on.      Problem: ABCDS Injury Assessment  Goal: Absence of physical injury  12/19/2022 1209 by Antonieta Fuhcs RN  Outcome: Progressing  Flowsheets (Taken 12/17/2022 2117)  Absence of Physical Injury: Implement safety measures based on patient assessment  12/18/2022 2228 by Kale Conrad RN  Outcome: Progressing     Problem: Genitourinary - Adult  Goal: Absence of urinary retention  12/19/2022 1209 by Nadine Cooper RN  Outcome: Progressing  Flowsheets (Taken 12/19/2022 5266)  Absence of urinary retention: Assess patients ability to void and empty bladder  12/18/2022 2228 by Jose Winston RN  Outcome: Progressing     Problem: Metabolic/Fluid and Electrolytes - Adult  Goal: Electrolytes maintained within normal limits  12/19/2022 1209 by Nadine Cooper RN  Outcome: Progressing  Flowsheets (Taken 12/19/2022 9937)  Electrolytes maintained within normal limits: Monitor labs and assess patient for signs and symptoms of electrolyte imbalances  12/18/2022 2228 by Jose Winston RN  Outcome: Progressing  Note: Replacing calcium  Goal: Hemodynamic stability and optimal renal function maintained  12/19/2022 1209 by Nadine Cooper RN  Outcome: Progressing  Flowsheets (Taken 12/19/2022 9994)  Hemodynamic stability and optimal renal function maintained: Monitor intake, output and patient weight  12/18/2022 2228 by Jose Winston RN  Outcome: Progressing     Problem: Chronic Conditions and Co-morbidities  Goal: Patient's chronic conditions and co-morbidity symptoms are monitored and maintained or improved  12/19/2022 1209 by Nadine Cooper RN  Outcome: Progressing  [address] (Taken 12/19/2022 0811)  Care Plan - Patient's Chronic Conditions and Co-Morbidity Symptoms are Monitored and Maintained or Improved: Monitor and assess patient's chronic conditions and comorbid symptoms for stability, deterioration, or improvement  12/18/2022 2228 by Jose Winston RN  Outcome: Progressing     Problem: Nutrition Deficit:  Goal: Optimize nutritional status  12/19/2022 1209 by Nadine Cooper RN  Outcome: Progressing  Flowsheets (Taken 12/19/2022 1106 by Florida Guerrier RD)  Nutrient intake appropriate for improving, restoring, or maintaining nutritional needs:   Assess nutritional status and recommend course of action   Monitor oral intake, labs, and treatment plans   Recommend appropriate diets, oral nutritional supplements, and vitamin/mineral supplements   Provide specific nutrition education to patient or family as appropriate  12/19/2022 1106 by Lj Swain RD  Flowsheets (Taken 12/19/2022 1106)  Nutrient intake appropriate for improving, restoring, or maintaining nutritional needs:   Assess nutritional status and recommend course of action   Monitor oral intake, labs, and treatment plans   Recommend appropriate diets, oral nutritional supplements, and vitamin/mineral supplements   Provide specific nutrition education to patient or family as appropriate   Care plan reviewed with patient and family. Patient and family verbalize understanding of the plan of care and contribute to goal setting.
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
## 2022-12-19 NOTE — PROGRESS NOTES
Hospitalist Progress Note    Patient: Ty Rojas      Unit/Bed:6K-06/006-A    YOB: 1955    MRN: [id number]       Acct: [de-identified]     PCP: Bella Delgadillo MD    Date of Admission: 12/16/2022    Assessment/Plan:    ERUM on CKD stage IV:   Baseline creatinine range ~ 1.8-2. Creatinine slowly down trending from 8.2 (POA). Avoid nephrotoxic agents, renally dose medications. Obtain daily standing weight  Strict I&O's  Nephrology consulted and following appreciate recs    12/17: Cr 7.3, total 24 h  mL  12/18: Cr 7.3, total 24 h UOP 1.8 L  12//19: Cr 7.1, total 24 h UOP 1.35 L    High AGMA, improving:   Likely due to ERUM/dehydration. On bicarb drip. Lactic acid 0.8. Nephrology following    Hemoptysis, resolved  Patient noted to have 3 episodes of hemoptysis with coughing. Nurse was able to send me a picture of the bloody sputum she coughed up in tissue   CT chest notable for minimal atelectatic/fibrotic stranding left lung base and lingula. Sputum culture ordered  Pulmonology consulted, appreciate recs: \"likely combination of recent URI dry humidity and use of plavix and ASA, Pulmonary service will sign off no follow-up needed \"    Hypokalemia, improving: 3.9  Creatinine clearance < 5 mL/min. Not able to use standard potassium replacement protocol. Manually dose repletion  Daily EfferK 40 mEq. > on hold as potassium is 3.9  Daily lab    Hypocalcemia, ongoing: serum 7.4, iCal 0.83  Order for calcium gluconate. Calcium replacement protocol. Daily iCal    Hypernatremia: 146  Secondary to #1, nephrology following    Hypomagnesemia: 1.5  Creatinine clearance < 5 mL/min. Not able to use standard magnesium replacement protocol. Manually dose repletion  Increase Mag ox 400 mg BID  Daily magnesium level    Hyperphosphatemia, improving:  Spoke with nephrology. No need for phosphorus binders at this time. D/t ERUM, should resolve as ERUM improves.    Daily phosphorus level.     12/17: phos 7.4  12/18: phos 6.3  12/19: phos 6.1    Elevated troponin  Was 0.033 (POA). Patient denies chest pains, ECG notable for normal sinus rhythm without ischemic changes,  ms . Suspect likely d/t cardiorenal syndrome given #1. Abdominal pain, nausea, vomiting:   Patient reports N/V, which may be from limited intake of food. Reports abdominal pain. Reports last BM was on Tuesday. States she is passing gas. Stool softners, as needed miralax   As needed antiemetics     12/18: Denies abd pain, N/V today. Had 2 medium solid BM's.   12/19: Only having nausea, nothing else. URI:   CXR negative for pulmonary infiltrates or effusions. Patient has been on doxycycline started on 12/12. No evidence of bacterial infection, will hold off on further antibiotics. Supportive care. Acute on chronic macrocytic anemia:   Baseline hemoglobin range ~10-11. H&H stable but down trending. Patient denies bleeding, melena, hematochezia. No external bleeding appreciated. Suspect likely dilutional component from IVF compounded by poor kidney fxn. FOBT ordered to r/o microscopic bleed. H&H stable at this time. Transfuse for hemoglobin less than 7 g/dL or hemodynamic instability. Iron studies notable for ferritin 402, iron 145, iron sat 90, TIBC less than 162, folate normal, vitamin B12 greater than 2000  Trend and monitor CBC. 12/16: H&H 10.3/30.6  12/17: H&H 8.7/25.4  12/18: H&H 8.3/ 24.4  12/19: H&H 7.7/ 23.2      Chronic HFpEF, compensated:   Echo (2/15/2022) notable for EF 55 to 60%, no WMA, G1 DD, mild tricuspid regurg. Continue ASA, statin, Plavix, amlodipine. Daily weights, strict I&O's.     Cardiac diet, 2G sodium    Essential hypertension, controlled:   Continue amlodipine    Hyperlipidemia:   Statin    Depression/anxiety:   Continue Wellbutrin, trazodone    History of CVA:   Continue aspirin, Plavix, statin      Expected discharge date:  Pending clinical course    Disposition:    [x] Home       [] TCU       [] Rehab       [] Psych       [] SNF       [] [place]       [] Other-    Chief Complaint: Dehydration    Hospital Course: Per HPI documented 12/16/2022: Valerie Pina is a 69 y.o. female with PMHx of CVA, depression, who presented to [facility] with chief complaint of dehydration, N/V. Patient states that she was seen by her PCP on Monday, 12/12 for congestion, sore throat, fatigue, weakness on going for >2 weeks. She was started on PO doxycycline. She continues to feel congested but has no sinus pain or pressure, no cough, afebrile. Approx 2 days after starting doxy, patient developed N/V. Reports very poor PO intake, states she has only been eating ice chips. Reports lower abdominal pain, last BM 3 days ago. Reports decreased urine output, states it is very dark. Denies dysuria, urgency, frequency. No chest pain, SOB, f/c. Pt follows with Dr. Compa Burkett for CKD. Admitted to med/tele for further management. \"    12/17/22: Resumed care of patient today. Patient afebrile, satting 94% on room air, with hemodynamically stable vital signs. Patient stating she feels weak and tired this morning. States that she still feels nauseous and when she ate some breakfast this morning she started dry heaving and almost vomited. States that she has very little appetite. Also reported some intermittent abdominal cramping. States she is passing gas. States her last bowel movement was Tuesday. Stool softeners added. Multiple electrolyte derangements as noted above. Creatinine 7.3 today, down from 8.2. Discussed case with Dr. Pily Melendez nephrology, who recommend decreasing bicarb drip to rate of 100 mL/hr. Patient making urine 900 mL OP overnight. 12/18/22: Afebrile, hemodynamically stable. No acute events overnight. Patient stating she feels a little better this morning but still feels weak. States that she has not had any nausea or vomiting since yesterday. States she had 2 medium solid BMs and no longer has abdominal pain. States she ate all her breakfast.  Creatinine at 7.3 today, no improvement. Ionized calcium still remains low, will continue to replete. Potassium magnesium still low, will continue to replete. Continue IV bicarb drip.    12/19/22: Afebrile, satting 97% on room air, hemodynamically stable. Patient states she is feeling better. Reports no more hemoptysis. States that she still having some nausea and I eating much of her meals. Denies vomiting and abdominal pains. No other complaints. Creatinine 7.1 today. Slow improvement. Continue to correct electrolyte derangements as noted above. Continue IV bicarb drip      Subjective (past 24 hours):      Denies chest pains, shortness of breath at rest, TUTTLE, palpitations, dizziness, lightheadedness, abdominal pains, vomiting, diarrhea, fever, chills.     Medications:  Reviewed    Infusion Medications    sodium chloride      sodium bicarbonate infusion 100 mL/hr at 12/19/22 1433     Scheduled Medications    magnesium oxide  400 mg Oral BID    calcium replacement protocol   Other RX Placeholder    calcitRIOL  0.25 mcg Oral Once per day on Sun Tue Thu Sat    docusate sodium  100 mg Oral Daily    senna  1 tablet Oral Nightly    [Held by provider] potassium bicarb-citric acid  40 mEq Oral Daily    amLODIPine  5 mg Oral Daily    aspirin  81 mg Oral Daily    buPROPion  200 mg Oral BID    clopidogrel  75 mg Oral Daily    folic acid  974 mcg Oral Daily    montelukast  10 mg Oral Daily    atorvastatin  10 mg Oral Daily    traZODone  50 mg Oral Nightly    polyethylene glycol  17 g Oral Daily    sodium chloride flush  10 mL IntraVENous 2 times per day    heparin (porcine)  5,000 Units SubCUTAneous 3 times per day     PRN Meds: chlordiazePOXIDE-clidinium, fluticasone, simethicone, sodium chloride flush, sodium chloride, ondansetron **OR** ondansetron, acetaminophen **OR** acetaminophen, dextromethorphan-guaiFENesin, cetirizine, hydrALAZINE      Intake/Output Summary (Last 24 hours) at 12/19/2022 1511  Last data filed at 12/19/2022 1412  Gross per 24 hour   Intake 3146.52 ml   Output 1350 ml   Net 1796.52 ml       Diet:  ADULT ORAL NUTRITION SUPPLEMENT; Breakfast, Dinner; Standard 4 oz Oral Supplement  ADULT DIET; Regular; 4 carb choices (60 gm/meal); Low Fat/Low Chol/High Fiber/2 gm Na; Less than 60 gm    Exam:  /71   Pulse 82   Temp 98.1 °F (36.7 °C) (Oral)   Resp 16   Ht 5' (1.524 m)   Wt 123 lb 10.9 oz (56.1 kg)   SpO2 100%   BMI 24.15 kg/m²     General appearance: No apparent distress, appears stated age and cooperative. HEENT: Pupils equal, round, and reactive to light. Conjunctivae/corneas clear. Neck: Supple, with full range of motion. No jugular venous distention. Trachea midline. Respiratory:  Normal respiratory effort, able to speak full clear sentences. Clear to auscultation, bilaterally without Rales/Wheezes/Rhonchi. Cardiovascular: Regular rate and rhythm with normal S1/S2 without murmurs, rubs or gallops. Abdomen: Soft, non-tender, non-distended with normal bowel sounds. Musculoskeletal: passive and active ROM x 4 extremities. Skin: Skin color, texture, turgor normal.  No rashes or lesions. Neurologic:  Neurovascularly intact without any focal sensory/motor deficits.  Cranial nerves: II-XII intact, grossly non-focal.  Psychiatric: Alert and oriented, thought content appropriate, normal insight  Capillary Refill: Brisk,< 3 seconds   Peripheral Pulses: +2 palpable, equal bilaterally       Labs:   Recent Labs     12/17/22  0517 12/17/22  1200 12/18/22  0554 12/19/22  0633   WBC 8.5  --  7.6 6.9   HGB 8.3* 8.7* 8.3* 7.7*   HCT 23.8* 25.4* 24.4* 23.2*     --  125* 97*     Recent Labs     12/17/22  0517 12/17/22  1340 12/18/22  0554 12/19/22  0633   *  --  144 146*   K 3.1*   < > 3.3*  3.3* 3.9  3.9     --  104 101   CO2 17*  --  21* 30   BUN 90*  -- 88* 86*   CREATININE 7.3*  --  7.3* 7.1*   CALCIUM 6.2*  --  6.6* 7.4*   PHOS 7.4*  --  6.3* 6.1*    < > = values in this interval not displayed. Recent Labs     12/17/22  0517 12/18/22  0554 12/19/22  0633   AST 23 25 23   ALT 14 15 14   BILITOT 0.4 0.5 0.5   ALKPHOS 79 85 87     No results for input(s): INR in the last 72 hours. No results for input(s): Ester Solo in the last 72 hours. Microbiology:      Urinalysis:      Lab Results   Component Value Date/Time    NITRU NEGATIVE 12/16/2022 05:20 PM    WBCUA 10-15 12/16/2022 05:20 PM    BACTERIA NONE SEEN 12/16/2022 05:20 PM    RBCUA 5-10 12/16/2022 05:20 PM    BLOODU TRACE 12/16/2022 05:20 PM    SPECGRAV 1.010 12/16/2022 05:20 PM    GLUCOSEU Negative 07/16/2021 04:09 PM    GLUCOSEU NEGATIVE 06/09/2020 03:03 PM       Radiology:  XR CHEST (2 VW)    Result Date: 12/16/2022  PROCEDURE: XR CHEST (2 VW) CLINICAL INFORMATION: SOB, cough. COMPARISON: Chest x-ray dated 9/18/2021. . TECHNIQUE: PA and lateral views the chest. FINDINGS: The heart size is normal.  The mediastinum is not widened. There are no pulmonary infiltrates or effusions. The pulmonary vascularity is normal. There are postoperative changes in the lower cervical spine. .  There is mild thoracic spondylosis. 1. No interval change since previous study dated 9/18/2021. Maryann Stands **This report has been created using voice recognition software. It may contain minor errors which are inherent in voice recognition technology. ** Final report electronically signed by DR William Rush on 12/16/2022 11:36 AM      DVT prophylaxis: [] Lovenox                                 [] SCDs                                 [x] SQ Heparin                                 [] Encourage ambulation           [] Already on Anticoagulation     Code Status: Full Code    PT/OT Eval Status: Per PT note documented 12/19/2022: \"ASSESSMENT:  Activity Tolerance:  Patient tolerance of  treatment: fair.        Treatment Initiated: Treatment and education initiated within context of evaluation. Evaluation time included review of current medical information, gathering information related to past medical, social and functional history, completion of standardized testing, formal and informal observation of tasks, assessment of data and development of plan of care and goals. Treatment time included skilled education and facilitation of tasks to increase safety and independence with functional mobility for improved independence and quality of life. Assessment: Body Structures, Functions, Activity Limitations Requiring Skilled Therapeutic Intervention: Decreased functional mobility , Decreased endurance, Decreased balance, Decreased posture, Decreased strength, Decreased safe awareness, Decreased coordination  Assessment: Lori Cohen is a 69 y.o. female who presents with the deficits stated previously. Pt requires 1 person assist for functional tasks with use of walker for support. Pt cont to require skilled PT services to increase IND with functional tasks and progress towards PLOF to return to home environment safely. Therapy Prognosis: Good     Requires PT Follow-Up: Yes     Discharge Recommendations:  Discharge Recommendations: Continue to assess pending progress, Patient would benefit from continued therapy after discharge  Outpatient PT services vs [facility] services. \"      Pending OT recommendations    Tele:   [x] yes             [] no    Normal sinus rhythm without ectopy.     Active Hospital Problems    Diagnosis Date Noted    ERUM (acute kidney injury) ([facility] [address] [N17.9] 12/18/2022     Priority: Medium    Dehydration [E86.0] 12/17/2022     Priority: Medium    Hypokalemia [E87.6] 12/17/2022     Priority: Medium    Hypomagnesemia [E83.42] 12/17/2022     Priority: Medium    Hyperphosphatemia [E83.39] 12/17/2022     Priority: Medium    Hypocalcemia [E83.51] 12/17/2022     Priority: Medium    Hemoptysis [R04.2] 12/17/2022     Priority: Medium Acute kidney injury superimposed on chronic kidney disease ([facility] [address] [N17.9, N18.9] 12/16/2022     Priority: Medium    Metabolic acidosis, increased anion gap [E87.29] 12/16/2022     Priority: Medium       Electronically signed by CARLOS MANUEL Bailey CNP on 12/19/2022 at 3:11 PM

## 2022-12-19 NOTE — PROGRESS NOTES
[facility]   Nephrology progress note  12/19/2022, 2:49 PM      Pt Name:    Gloria Howe  MRN:     [id number]     YOB: 1955  Admit Date:    12/16/2022 10:35 AM    Chief Complaint: Nephrology following for acute kidney injury and metabolic acidosis. Subjective:  Patient seen and examined  No chest pain or shortness of breath  Feels better. Says making more urine output    Objective:  24HR INTAKE/OUTPUT:    Intake/Output Summary (Last 24 hours) at 12/19/2022 1449  Last data filed at 12/19/2022 1412  Gross per 24 hour   Intake 3146.52 ml   Output 1350 ml   Net 1796.52 ml      Admission weight: 114 lb (51.7 kg)  Wt Readings from Last 3 Encounters:   12/19/22 123 lb 10.9 oz (56.1 kg)   12/16/22 114 lb (51.7 kg)   12/12/22 116 lb (52.6 kg)        Vitals :   Vitals:    12/19/22 0315 12/19/22 0600 12/19/22 0809 12/19/22 1150   BP:   (!) 131/59 128/71   Pulse: 81  77 82   Resp: 16  16 16   Temp: 98.2 °F (36.8 °C)  97.9 °F (36.6 °C) 98.1 °F (36.7 °C)   TempSrc: Oral  Oral Oral   SpO2: 97%  98% 100%   Weight:  123 lb 10.9 oz (56.1 kg)     Height:           Physical examination  General Appearance:  Well developed.  No distress  Mouth/Throat:  Oral mucosa moist  Neck:  Supple, no JVD  Lungs:  Breath sounds: clear  Heart[de-identified]  S1,S2 heard  Abdomen:  Soft, non - tender  Musculoskeletal:  Edema -no edema noted    Medications:  Infusion:    sodium chloride      sodium bicarbonate infusion 100 mL/hr at 12/19/22 1433     Meds:    magnesium oxide  400 mg Oral BID    calcium replacement protocol   Other RX Placeholder    calcitRIOL  0.25 mcg Oral Once per day on Sun Tue Thu Sat    docusate sodium  100 mg Oral Daily    senna  1 tablet Oral Nightly    [Held by provider] potassium bicarb-citric acid  40 mEq Oral Daily    amLODIPine  5 mg Oral Daily    aspirin  81 mg Oral Daily    buPROPion  200 mg Oral BID    clopidogrel  75 mg Oral Daily    folic acid  096 mcg Oral Daily    montelukast  10 mg Oral Daily    atorvastatin  10 mg Oral Daily    traZODone  50 mg Oral Nightly    polyethylene glycol  17 g Oral Daily    sodium chloride flush  10 mL IntraVENous 2 times per day    heparin (porcine)  5,000 Units SubCUTAneous 3 times per day       Lab Data :  CBC:   Recent Labs     12/17/22  0517 12/17/22  1200 12/18/22  0554 12/19/22  0633   WBC 8.5  --  7.6 6.9   HGB 8.3* 8.7* 8.3* 7.7*   HCT 23.8* 25.4* 24.4* 23.2*     --  125* 97*     CMP:  Recent Labs     12/17/22  0517 12/17/22  1340 12/18/22  0554 12/19/22  0633   *  --  144 146*   K 3.1*   < > 3.3*  3.3* 3.9  3.9     --  104 101   CO2 17*  --  21* 30   BUN 90*  --  88* 86*   CREATININE 7.3*  --  7.3* 7.1*   GLUCOSE 70  --  78 84   CALCIUM 6.2*  --  6.6* 7.4*   MG 1.5*  --  1.5* 1.5*   PHOS 7.4*  --  6.3* 6.1*    < > = values in this interval not displayed. Hepatic:   Recent Labs     12/17/22  0517 12/18/22  0554 12/19/22  0633   LABALBU 3.4* 3.3* 3.0*   AST 23 25 23   ALT 14 15 14   BILITOT 0.4 0.5 0.5   ALKPHOS 79 85 87       Assessment and Plan:  Renal -acute kidney injury most likely secondary to severe volume depletion,  It looks extremely dry we will aggressively hydrate the patient monitor renal function  Renal function overall not much improvement. We will continue IV fluids  Send urine for protein creatinine ratio as she also had some questionable hemoptysis  We will obtain a renal ultrasound scan as well  Electrolytes -mild hypernatremia switch to half-normal saline  Acid-base status-metabolic acidosis improved sodium is rising switch to half-normal saline  Hypocalcemia could get worse with bicarbonate will give a gram of calcium gluconate IV  CKD stage IV with baseline creatinine 1.8-2.0  Hx of fibromyalgia  Essential hypertension stable  Hypophosphatemia  Meds reviewed and discussed with patient     Benji Reddy MD  [facility]    This report has been created using voice recognition software.  It may contain minor errors which are inherent in voice recognition technology

## 2022-12-20 ENCOUNTER — APPOINTMENT (OUTPATIENT)
Dept: ULTRASOUND IMAGING | Age: 67
End: 2022-12-20
Payer: MEDICARE

## 2022-12-20 LAB
ANION GAP SERPL CALCULATED.3IONS-SCNC: 19 MEQ/L (ref 8–16)
ANION GAP SERPL CALCULATED.3IONS-SCNC: 19 MEQ/L (ref 8–16)
BASOPHILS # BLD: 0.1 %
BASOPHILS ABSOLUTE: 0 THOU/MM3 (ref 0–0.1)
BUN BLDV-MCNC: 82 MG/DL (ref 7–22)
BUN BLDV-MCNC: 83 MG/DL (ref 7–22)
C3 COMPLEMENT: 84 MG/DL (ref 90–180)
CALCIUM IONIZED: 0.88 MMOL/L (ref 1.12–1.32)
CALCIUM SERPL-MCNC: 7 MG/DL (ref 8.5–10.5)
CALCIUM SERPL-MCNC: 7.6 MG/DL (ref 8.5–10.5)
CHLORIDE BLD-SCNC: 98 MEQ/L (ref 98–111)
CHLORIDE BLD-SCNC: 98 MEQ/L (ref 98–111)
CO2: 25 MEQ/L (ref 23–33)
CO2: 28 MEQ/L (ref 23–33)
COMPLEMENT C4: 22 MG/DL (ref 10–40)
CREAT SERPL-MCNC: 6.3 MG/DL (ref 0.4–1.2)
CREAT SERPL-MCNC: 6.6 MG/DL (ref 0.4–1.2)
CREATININE, URINE: 23.8 MG/DL
EOSINOPHIL # BLD: 0.4 %
EOSINOPHIL SMEAR, URINE: NORMAL
EOSINOPHILS ABSOLUTE: 0 THOU/MM3 (ref 0–0.4)
ERYTHROCYTE [DISTWIDTH] IN BLOOD BY AUTOMATED COUNT: 13 % (ref 11.5–14.5)
ERYTHROCYTE [DISTWIDTH] IN BLOOD BY AUTOMATED COUNT: 43.6 FL (ref 35–45)
GFR SERPL CREATININE-BSD FRML MDRD: 6 ML/MIN/1.73M2
GFR SERPL CREATININE-BSD FRML MDRD: 7 ML/MIN/1.73M2
GLUCOSE BLD-MCNC: 80 MG/DL (ref 70–108)
GLUCOSE BLD-MCNC: 91 MG/DL (ref 70–108)
HCT VFR BLD CALC: 22.2 % (ref 37–47)
HEMOCCULT STL QL: NEGATIVE
HEMOGLOBIN: 7.4 GM/DL (ref 12–16)
IMMATURE GRANS (ABS): 0.07 THOU/MM3 (ref 0–0.07)
IMMATURE GRANULOCYTES: 1 %
LACTIC ACID: 1.1 MMOL/L (ref 0.5–2)
LYMPHOCYTES # BLD: 31.1 %
LYMPHOCYTES ABSOLUTE: 2.3 THOU/MM3 (ref 1–4.8)
MAGNESIUM: 2.2 MG/DL (ref 1.6–2.4)
MCH RBC QN AUTO: 31.1 PG (ref 26–33)
MCHC RBC AUTO-ENTMCNC: 33.3 GM/DL (ref 32.2–35.5)
MCV RBC AUTO: 93.3 FL (ref 81–99)
MICROALBUMIN UR-MCNC: 3.19 MG/DL
MICROALBUMIN/CREAT UR-RTO: 134 MG/G (ref 0–30)
MONOCYTES # BLD: 8.8 %
MONOCYTES ABSOLUTE: 0.6 THOU/MM3 (ref 0.4–1.3)
NUCLEATED RED BLOOD CELLS: 0 /100 WBC
PHOSPHORUS: 5.5 MG/DL (ref 2.4–4.7)
PLATELET # BLD: 85 THOU/MM3 (ref 130–400)
PMV BLD AUTO: 11.8 FL (ref 9.4–12.4)
POTASSIUM REFLEX MAGNESIUM: 3.9 MEQ/L (ref 3.5–5.2)
POTASSIUM REFLEX MAGNESIUM: 4.1 MEQ/L (ref 3.5–5.2)
POTASSIUM SERPL-SCNC: 3.9 MEQ/L (ref 3.5–5.2)
RBC # BLD: 2.38 MILL/MM3 (ref 4.2–5.4)
SEG NEUTROPHILS: 58.6 %
SEGMENTED NEUTROPHILS ABSOLUTE COUNT: 4.3 THOU/MM3 (ref 1.8–7.7)
SODIUM BLD-SCNC: 142 MEQ/L (ref 135–145)
SODIUM BLD-SCNC: 145 MEQ/L (ref 135–145)
WBC # BLD: 7.3 THOU/MM3 (ref 4.8–10.8)

## 2022-12-20 PROCEDURE — 83605 ASSAY OF LACTIC ACID: CPT

## 2022-12-20 PROCEDURE — 6370000000 HC RX 637 (ALT 250 FOR IP)

## 2022-12-20 PROCEDURE — 97110 THERAPEUTIC EXERCISES: CPT

## 2022-12-20 PROCEDURE — 82330 ASSAY OF CALCIUM: CPT

## 2022-12-20 PROCEDURE — 36415 COLL VENOUS BLD VENIPUNCTURE: CPT

## 2022-12-20 PROCEDURE — 99232 SBSQ HOSP IP/OBS MODERATE 35: CPT

## 2022-12-20 PROCEDURE — 84100 ASSAY OF PHOSPHORUS: CPT

## 2022-12-20 PROCEDURE — 76770 US EXAM ABDO BACK WALL COMP: CPT

## 2022-12-20 PROCEDURE — 2580000003 HC RX 258: Performed by: INTERNAL MEDICINE

## 2022-12-20 PROCEDURE — 97116 GAIT TRAINING THERAPY: CPT

## 2022-12-20 PROCEDURE — 1200000003 HC TELEMETRY R&B

## 2022-12-20 PROCEDURE — 83735 ASSAY OF MAGNESIUM: CPT

## 2022-12-20 PROCEDURE — 85025 COMPLETE CBC W/AUTO DIFF WBC: CPT

## 2022-12-20 PROCEDURE — 6370000000 HC RX 637 (ALT 250 FOR IP): Performed by: INTERNAL MEDICINE

## 2022-12-20 PROCEDURE — 99233 SBSQ HOSP IP/OBS HIGH 50: CPT | Performed by: INTERNAL MEDICINE

## 2022-12-20 PROCEDURE — 80048 BASIC METABOLIC PNL TOTAL CA: CPT

## 2022-12-20 PROCEDURE — 6370000000 HC RX 637 (ALT 250 FOR IP): Performed by: PHYSICIAN ASSISTANT

## 2022-12-20 PROCEDURE — 6360000002 HC RX W HCPCS

## 2022-12-20 PROCEDURE — 6360000002 HC RX W HCPCS: Performed by: PHYSICIAN ASSISTANT

## 2022-12-20 RX ORDER — CALCIUM GLUCONATE 20 MG/ML
2000 INJECTION, SOLUTION INTRAVENOUS ONCE
Status: COMPLETED | OUTPATIENT
Start: 2022-12-20 | End: 2022-12-20

## 2022-12-20 RX ORDER — PROMETHAZINE HYDROCHLORIDE 25 MG/ML
6.25 INJECTION, SOLUTION INTRAMUSCULAR; INTRAVENOUS EVERY 6 HOURS PRN
Status: DISCONTINUED | OUTPATIENT
Start: 2022-12-20 | End: 2022-12-30 | Stop reason: HOSPADM

## 2022-12-20 RX ADMIN — MONTELUKAST SODIUM 10 MG: 10 TABLET ORAL at 21:02

## 2022-12-20 RX ADMIN — ATORVASTATIN CALCIUM 10 MG: 10 TABLET, FILM COATED ORAL at 11:28

## 2022-12-20 RX ADMIN — CALCIUM GLUCONATE 2000 MG: 20 INJECTION, SOLUTION INTRAVENOUS at 10:24

## 2022-12-20 RX ADMIN — HEPARIN SODIUM 5000 UNITS: 5000 INJECTION INTRAVENOUS; SUBCUTANEOUS at 13:54

## 2022-12-20 RX ADMIN — BUPROPION HYDROCHLORIDE 200 MG: 100 TABLET, FILM COATED, EXTENDED RELEASE ORAL at 11:28

## 2022-12-20 RX ADMIN — CETIRIZINE HYDROCHLORIDE 5 MG: 5 TABLET ORAL at 21:02

## 2022-12-20 RX ADMIN — BUPROPION HYDROCHLORIDE 200 MG: 100 TABLET, FILM COATED, EXTENDED RELEASE ORAL at 21:01

## 2022-12-20 RX ADMIN — HEPARIN SODIUM 5000 UNITS: 5000 INJECTION INTRAVENOUS; SUBCUTANEOUS at 21:02

## 2022-12-20 RX ADMIN — SODIUM CHLORIDE: 4.5 INJECTION, SOLUTION INTRAVENOUS at 02:39

## 2022-12-20 RX ADMIN — ONDANSETRON 4 MG: 4 TABLET, ORALLY DISINTEGRATING ORAL at 11:38

## 2022-12-20 RX ADMIN — DOCUSATE SODIUM 100 MG: 100 CAPSULE, LIQUID FILLED ORAL at 11:28

## 2022-12-20 RX ADMIN — CLOPIDOGREL BISULFATE 75 MG: 75 TABLET ORAL at 11:28

## 2022-12-20 RX ADMIN — HEPARIN SODIUM 5000 UNITS: 5000 INJECTION INTRAVENOUS; SUBCUTANEOUS at 06:32

## 2022-12-20 RX ADMIN — AMLODIPINE BESYLATE 5 MG: 5 TABLET ORAL at 11:28

## 2022-12-20 RX ADMIN — FOLIC ACID 500 MCG: 1 TABLET ORAL at 11:28

## 2022-12-20 RX ADMIN — ASPIRIN 81 MG: 81 TABLET, COATED ORAL at 21:02

## 2022-12-20 RX ADMIN — CALCITRIOL CAPSULES 0.25 MCG 0.25 MCG: 0.25 CAPSULE ORAL at 11:28

## 2022-12-20 RX ADMIN — TRAZODONE HYDROCHLORIDE 50 MG: 50 TABLET ORAL at 21:01

## 2022-12-20 ASSESSMENT — PAIN DESCRIPTION - LOCATION: LOCATION: ABDOMEN

## 2022-12-20 ASSESSMENT — PAIN SCALES - GENERAL: PAINLEVEL_OUTOF10: 3

## 2022-12-20 NOTE — FLOWSHEET NOTE
12/20/22 0941   Safe Environment   Safety Measures Other (comment)  (Virtual nurse round complete)   Per audio heard at bedside, staff in room discussing patient care. Did not attempt to further interrupt at this time.

## 2022-12-20 NOTE — PROGRESS NOTES
[facility]  OCCUPATIONAL THERAPY MISSED TREATMENT NOTE  [facility] RENAL TELEMETRY 6K  6K-06/006-A      Date: 2022  Patient Name: Abel Cooley        CSN: [id number]   : 1955  (69 y.o.)  Gender: female   Referring Practitioner: CARLOS MANUEL Washburn CNP  Diagnosis: Acute kidney injury superimposed on chronic kidney disease         REASON FOR MISSED TREATMENT: Patient Refused. Pt stated she just had an emesis episode right before this OT arrived, stating she was fearful to stand incase it would happen again. OT to try back later as able.

## 2022-12-20 NOTE — PROGRESS NOTES
Hospitalist Progress Note    Patient: Torito Gómez      Unit/Bed:6K-06/006-A    YOB: 1955    MRN: [id number]       Acct: [de-identified]     PCP: Shelley Kemp MD    Date of Admission: 2022    Assessment/Plan:    ERUM on CKD stage IV:   Baseline creatinine range ~ 1.8-2. Creatinine slowly down trending from 8.2 (POA). Renal US ordered  Avoid nephrotoxic agents, renally dose medications. Obtain daily standing weight  Strict I&O's  Nephrology consulted and following appreciate recs    : Cr 7.3, total 24 h  mL  : Cr 7.3, total 24 h UOP 1.8 L  : Cr 7.1, total 24 h UOP 1.35 L  : Cr 6.6, toatl 24h UOP 1.2 L    High AGMA, improving:   Likely due to ERUM/dehydration. On bicarb drip. Lactic acid 0.8. Nephrology following    Hemoptysis, resolved  Patient noted to have 3 episodes of hemoptysis with coughing. Nurse was able to send me a picture of the bloody sputum she coughed up in tissue   CT chest notable for minimal atelectatic/fibrotic stranding left lung base and lingula. Sputum culture ordered  Pulmonology consulted, appreciate recs: \"likely combination of recent URI dry humidity and use of plavix and ASA, Pulmonary service will sign off no follow-up needed \"    Hypokalemia, improving:   Creatinine clearance < 5 mL/min. Not able to use standard potassium replacement protocol. Manually dose repletion  Daily EfferK 40 mEq. > on hold as potassium is 3.9  Daily lab    Hypocalcemia, ongoing: serum 7.4, iCal 0.83  Order for calcium gluconate. Calcium replacement protocol. Daily iCal    Hypernatremia, improvin  Secondary to #1, nephrology following    Hypomagnesemia,improving : 2.2  Creatinine clearance < 5 mL/min. Not able to use standard magnesium replacement protocol. Manually dose repletion  Mag ox 400 mg BID > hold d/t being in normal range  Daily magnesium level    Hyperphosphatemia, improving:  Spoke with nephrology.  No need for phosphorus binders at this time. D/t ERUM, should resolve as ERUM improves. Daily phosphorus level. 12/17: phos 7.4  12/18: phos 6.3  12/19: phos 6.1  12/20: phos 5.5    Elevated troponin  Was 0.033 (POA). Patient denies chest pains, ECG notable for normal sinus rhythm without ischemic changes,  ms . Suspect likely d/t cardiorenal syndrome given #1. Abdominal pain, nausea, vomiting:   Patient reports N/V, which may be from limited intake of food. Reports abdominal pain. Reports last BM was on Tuesday. States she is passing gas. Stool softners, as needed miralax   As needed antiemetics     12/18: Denies abd pain, N/V today. Had 2 medium solid BM's.   12/19: Only having nausea, nothing else. 12/20: Had some N/V this AM.    URI:   CXR negative for pulmonary infiltrates or effusions. Patient has been on doxycycline started on 12/12. No evidence of bacterial infection, will hold off on further antibiotics. Supportive care. Acute on chronic macrocytic anemia:   Baseline hemoglobin range ~10-11. H&H stable but down trending. Patient denies bleeding, melena, hematochezia. No external bleeding appreciated. Suspect likely dilutional component from IVF compounded by poor kidney fxn. FOBT negative  H&H stable at this time. Transfuse for hemoglobin less than 7 g/dL or hemodynamic instability. Iron studies notable for ferritin 402, iron 145, iron sat 90, TIBC less than 162, folate normal, vitamin B12 greater than 2000  Trend and monitor CBC. 12/16: H&H 10.3/30.6  12/17: H&H 8.7/25.4  12/18: H&H 8.3/ 24.4  12/19: H&H 7.7/ 23.2  12/20: H&H 7.4/22.2      Chronic HFpEF, compensated:   Echo (2/15/2022) notable for EF 55 to 60%, no WMA, G1 DD, mild tricuspid regurg. Continue ASA, statin, Plavix, amlodipine. Daily weights, strict I&O's.     Cardiac diet, 2G sodium    Essential hypertension, controlled:   Continue amlodipine    Hyperlipidemia:   Statin    Depression/anxiety:   Continue Wellbutrin, trazodone    History of CVA:   Continue aspirin, Plavix, statin      Expected discharge date:  Pending clinical course    Disposition:    [x] Home       [] TCU       [] Rehab       [] Psych       [] SNF       [] [place]       [] Other-    Chief Complaint: Dehydration    Hospital Course: Per HPI documented 12/16/2022: Meng Cerrato is a 69 y.o. female with PMHx of CVA, depression, who presented to [facility] with chief complaint of dehydration, N/V. Patient states that she was seen by her PCP on Monday, 12/12 for congestion, sore throat, fatigue, weakness on going for >2 weeks. She was started on PO doxycycline. She continues to feel congested but has no sinus pain or pressure, no cough, afebrile. Approx 2 days after starting doxy, patient developed N/V. Reports very poor PO intake, states she has only been eating ice chips. Reports lower abdominal pain, last BM 3 days ago. Reports decreased urine output, states it is very dark. Denies dysuria, urgency, frequency. No chest pain, SOB, f/c. Pt follows with Dr. Amanda Villalba for CKD. Admitted to med/tele for further management. \"    12/17/22: Resumed care of patient today. Patient afebrile, satting 94% on room air, with hemodynamically stable vital signs. Patient stating she feels weak and tired this morning. States that she still feels nauseous and when she ate some breakfast this morning she started dry heaving and almost vomited. States that she has very little appetite. Also reported some intermittent abdominal cramping. States she is passing gas. States her last bowel movement was Tuesday. Stool softeners added. Multiple electrolyte derangements as noted above. Creatinine 7.3 today, down from 8.2. Discussed case with Dr. Yojana Brooks nephrology, who recommend decreasing bicarb drip to rate of 100 mL/hr. Patient making urine 900 mL OP overnight. 12/18/22: Afebrile, hemodynamically stable. No acute events overnight.   Patient stating she feels a little better this morning but still feels weak. States that she has not had any nausea or vomiting since yesterday. States she had 2 medium solid BMs and no longer has abdominal pain. States she ate all her breakfast.  Creatinine at 7.3 today, no improvement. Ionized calcium still remains low, will continue to replete. Potassium magnesium still low, will continue to replete. Continue IV bicarb drip.    12/19/22: Afebrile, satting 97% on room air, hemodynamically stable. Patient states she is feeling better. Reports no more hemoptysis. States that she still having some nausea and I eating much of her meals. Denies vomiting and abdominal pains. No other complaints. Creatinine 7.1 today. Slow improvement. Continue to correct electrolyte derangements as noted above. Continue IV bicarb drip    12/20/22: Hemodynamically stable. No acute events. Creatinine downtrending, 6.6 today. H&H fluctuating but stable. Phosphorus coming down. Ionized calcium still low, will replace again today. Potassium and magnesium okay will withhold replacement today. Patient stated that she did have acute episode of nausea followed by vomiting earlier this morning after eating her breakfast.  Currently states that she does not feel nauseous and has no complaints at this time. Continue IV fluids. Subjective (past 24 hours):      Denies chest pains, shortness of breath at rest, TUTTLE, palpitations, dizziness, lightheadedness, abdominal pains, vomiting, diarrhea, fever, chills.     Medications:  Reviewed    Infusion Medications    sodium chloride 100 mL/hr at 12/20/22 0548    sodium chloride       Scheduled Medications    [Held by provider] magnesium oxide  400 mg Oral BID    calcium replacement protocol   Other RX Placeholder    calcitRIOL  0.25 mcg Oral Once per day on Sun Tue Thu Sat    docusate sodium  100 mg Oral Daily    senna  1 tablet Oral Nightly    [Held by provider] potassium bicarb-citric acid  40 mEq Oral Daily    amLODIPine  5 mg Oral Daily    aspirin  81 mg Oral Daily    buPROPion  200 mg Oral BID    clopidogrel  75 mg Oral Daily    folic acid  049 mcg Oral Daily    montelukast  10 mg Oral Daily    atorvastatin  10 mg Oral Daily    traZODone  50 mg Oral Nightly    polyethylene glycol  17 g Oral Daily    sodium chloride flush  10 mL IntraVENous 2 times per day    heparin (porcine)  5,000 Units SubCUTAneous 3 times per day     PRN Meds: chlordiazePOXIDE-clidinium, fluticasone, simethicone, sodium chloride flush, sodium chloride, ondansetron **OR** ondansetron, acetaminophen **OR** acetaminophen, dextromethorphan-guaiFENesin, cetirizine, hydrALAZINE      Intake/Output Summary (Last 24 hours) at 12/20/2022 2119  Last data filed at 12/20/2022 1400  Gross per 24 hour   Intake 2412.69 ml   Output 800 ml   Net 1612.69 ml       Diet:  ADULT ORAL NUTRITION SUPPLEMENT; Breakfast, Dinner; Standard 4 oz Oral Supplement  ADULT DIET; Regular; 4 carb choices (60 gm/meal); Low Fat/Low Chol/High Fiber/2 gm Na; Less than 60 gm    Exam:  /78   Pulse 78   Temp 98.4 °F (36.9 °C) (Oral)   Resp 18   Ht 5' (1.524 m)   Wt 128 lb 4.9 oz (58.2 kg)   SpO2 100%   BMI 25.06 kg/m²     General appearance: No apparent distress, appears stated age and cooperative. HEENT: Pupils equal, round, and reactive to light. Conjunctivae/corneas clear. Neck: Supple, with full range of motion. No jugular venous distention. Trachea midline. Respiratory:  Normal respiratory effort, able to speak full clear sentences. Clear to auscultation, bilaterally without Rales/Wheezes/Rhonchi. Cardiovascular: Regular rate and rhythm with normal S1/S2 without murmurs, rubs or gallops. Abdomen: Soft, non-tender, non-distended with normal bowel sounds. Musculoskeletal: passive and active ROM x 4 extremities. Skin: Skin color, texture, turgor normal.  No rashes or lesions. Neurologic:  Neurovascularly intact without any focal sensory/motor deficits. Cranial nerves: II-XII intact, grossly non-focal.  Psychiatric: Alert and oriented, thought content appropriate, normal insight  Capillary Refill: Brisk,< 3 seconds   Peripheral Pulses: +2 palpable, equal bilaterally       Labs:   Recent Labs     12/18/22  0554 12/19/22  0633 12/19/22  1652 12/20/22  0503   WBC 7.6 6.9  --  7.3   HGB 8.3* 7.7* 8.3* 7.4*   HCT 24.4* 23.2* 24.8* 22.2*   * 97*  --  85*     Recent Labs     12/18/22  0554 12/19/22  0633 12/20/22  0503 12/20/22  1504    146* 145 142   K 3.3*  3.3* 3.9  3.9 3.9  3.9 4.1    101 98 98   CO2 21* 30 28 25   BUN 88* 86* 83* 82*   CREATININE 7.3* 7.1* 6.6* 6.3*   CALCIUM 6.6* 7.4* 7.0* 7.6*   PHOS 6.3* 6.1* 5.5*  --      Recent Labs     12/18/22  0554 12/19/22  0633   AST 25 23   ALT 15 14   BILITOT 0.5 0.5   ALKPHOS 85 87     No results for input(s): INR in the last 72 hours. No results for input(s): Magdalene Granda in the last 72 hours. Microbiology:      Urinalysis:      Lab Results   Component Value Date/Time    NITRU NEGATIVE 12/16/2022 05:20 PM    WBCUA 10-15 12/16/2022 05:20 PM    BACTERIA NONE SEEN 12/16/2022 05:20 PM    RBCUA 5-10 12/16/2022 05:20 PM    BLOODU TRACE 12/16/2022 05:20 PM    SPECGRAV 1.010 12/16/2022 05:20 PM    GLUCOSEU Negative 07/16/2021 04:09 PM    GLUCOSEU NEGATIVE 06/09/2020 03:03 PM       Radiology:  XR CHEST (2 VW)    Result Date: 12/16/2022  PROCEDURE: XR CHEST (2 VW) CLINICAL INFORMATION: SOB, cough. COMPARISON: Chest x-ray dated 9/18/2021. . TECHNIQUE: PA and lateral views the chest. FINDINGS: The heart size is normal.  The mediastinum is not widened. There are no pulmonary infiltrates or effusions. The pulmonary vascularity is normal. There are postoperative changes in the lower cervical spine. .  There is mild thoracic spondylosis. 1. No interval change since previous study dated 9/18/2021. Raphael Son **This report has been created using voice recognition software.  It may contain minor errors which are inherent in voice recognition technology. ** Final report electronically signed by DR Shellye Ahumada on 12/16/2022 11:36 AM      DVT prophylaxis: [] Lovenox                                 [] SCDs                                 [x] SQ Heparin                                 [] Encourage ambulation           [] Already on Anticoagulation     Code Status: Full Code    PT/OT Eval Status: Per PT note documented 12/19/2022: \"ASSESSMENT:  Activity Tolerance:  Patient tolerance of  treatment: fair. Treatment Initiated: Treatment and education initiated within context of evaluation. Evaluation time included review of current medical information, gathering information related to past medical, social and functional history, completion of standardized testing, formal and informal observation of tasks, assessment of data and development of plan of care and goals. Treatment time included skilled education and facilitation of tasks to increase safety and independence with functional mobility for improved independence and quality of life. Assessment: Body Structures, Functions, Activity Limitations Requiring Skilled Therapeutic Intervention: Decreased functional mobility , Decreased endurance, Decreased balance, Decreased posture, Decreased strength, Decreased safe awareness, Decreased coordination  Assessment: Neeraj Tolbert is a 69 y.o. female who presents with the deficits stated previously. Pt requires 1 person assist for functional tasks with use of walker for support. Pt cont to require skilled PT services to increase IND with functional tasks and progress towards PLOF to return to home environment safely. Therapy Prognosis: Good     Requires PT Follow-Up: Yes     Discharge Recommendations:  Discharge Recommendations: Continue to assess pending progress, Patient would benefit from continued therapy after discharge  Outpatient PT services vs [facility] services. \"      Pending OT recommendations    Tele:   [x] yes [] no    Normal sinus rhythm without ectopy.     Active Hospital Problems    Diagnosis Date Noted    Nosebleed [R04.0] 12/19/2022     Priority: Medium    ERUM (acute kidney injury) ([facility] [address] [N17.9] 12/18/2022     Priority: Medium    Dehydration [E86.0] 12/17/2022     Priority: Medium    Hypokalemia [E87.6] 12/17/2022     Priority: Medium    Hypomagnesemia [E83.42] 12/17/2022     Priority: Medium    Hyperphosphatemia [E83.39] 12/17/2022     Priority: Medium    Hypocalcemia [E83.51] 12/17/2022     Priority: Medium    Hemoptysis [R04.2] 12/17/2022     Priority: Medium    Acute kidney injury superimposed on chronic kidney disease ([facility] [address] [N17.9, N18.9] 12/16/2022     Priority: Medium    Metabolic acidosis [Y10.23] 12/16/2022     Priority: Medium       Electronically signed by Serafina Lundborg, APRN - CNP on 12/20/2022 at 9:19 PM

## 2022-12-20 NOTE — PLAN OF CARE
Problem: Discharge Planning  Goal: Discharge to home or other facility with appropriate resources  12/19/2022 2103 by Brown Keller RN  Outcome: Mary Lou Evans (Taken 12/19/2022 2103)  Discharge to home or other facility with appropriate resources:   Identify barriers to discharge with patient and caregiver   Arrange for needed discharge resources and transportation as appropriate   Identify discharge learning needs (meds, wound care, etc)   Arrange for interpreters to assist at discharge as needed   Refer to discharge planning if patient needs post-hospital services based on physician order or complex needs related to functional status, cognitive ability or social support system     Problem: Pain  Goal: Verbalizes/displays adequate comfort level or baseline comfort level  12/19/2022 2103 by Brown Keller RN  Outcome: Progressing  Flowsheets (Taken 12/19/2022 2103)  Verbalizes/displays adequate comfort level or baseline comfort level:   Encourage patient to monitor pain and request assistance   Assess pain using appropriate pain scale   Administer analgesics based on type and severity of pain and evaluate response   Implement non-pharmacological measures as appropriate and evaluate response   Consider cultural and social influences on pain and pain management   Notify Licensed Independent Practitioner if interventions unsuccessful or patient reports new pain     Problem: Skin/Tissue Integrity  Goal: Absence of new skin breakdown  Description: 1. Monitor for areas of redness and/or skin breakdown  2. Assess vascular access sites hourly  3. Every 4-6 hours minimum:  Change oxygen saturation probe site  4. Every 4-6 hours:  If on nasal continuous positive airway pressure, respiratory therapy assess nares and determine need for appliance change or resting period.   12/19/2022 2103 by Brown Keller RN  Outcome: Progressing     Problem: Safety - Adult  Goal: Free from fall injury  12/19/2022 2103 by Oscar Gray RN  Outcome: Progressing  Flowsheets (Taken 12/19/2022 2103)  Free From Fall Injury:   Instruct family/caregiver on patient safety   Based on caregiver fall risk screen, instruct family/caregiver to ask for assistance with transferring infant if caregiver noted to have fall risk factors     Problem: ABCDS Injury Assessment  Goal: Absence of physical injury  12/19/2022 2103 by Oscar Gray RN  Outcome: Progressing  Flowsheets (Taken 12/19/2022 2103)  Absence of Physical Injury: Implement safety measures based on patient assessment     Problem: Genitourinary - Adult  Goal: Absence of urinary retention  12/19/2022 2103 by Oscar Gray RN  Outcome: Progressing  Flowsheets (Taken 12/19/2022 2103)  Absence of urinary retention:   Assess patients ability to void and empty bladder   Monitor intake/output and perform bladder scan as needed   Place urinary catheter per Licensed Independent Practitioner order if needed   Discuss with Licensed Independent Practitioner  medications to alleviate retention as needed   Discuss catheterization for long term situations as appropriate     Problem: Metabolic/Fluid and Electrolytes - Adult  Goal: Electrolytes maintained within normal limits  12/19/2022 2103 by Oscar Gray RN  Outcome: Progressing  Flowsheets (Taken 12/19/2022 2103)  Electrolytes maintained within normal limits:   Monitor labs and assess patient for signs and symptoms of electrolyte imbalances   Administer electrolyte replacement as ordered   Monitor response to electrolyte replacements, including repeat lab results as appropriate   Fluid restriction as ordered   Instruct patient on fluid and nutrition restrictions as appropriate     Problem: Metabolic/Fluid and Electrolytes - Adult  Goal: Hemodynamic stability and optimal renal function maintained  12/19/2022 2103 by Oscar Gray RN  Outcome: Progressing  Flowsheets (Taken 12/19/2022 2103)  Hemodynamic stability and optimal renal function maintained:   Monitor labs and assess for signs and symptoms of volume excess or deficit   Monitor intake, output and patient weight   Monitor urine specific gravity, serum osmolarity and serum sodium as indicated or ordered   Monitor response to interventions for patient's volume status, including labs, urine output, blood pressure (other measures as available)   Encourage oral intake as appropriate   Instruct patient on fluid and nutrition restrictions as appropriate     Problem: Chronic Conditions and Co-morbidities  Goal: Patient's chronic conditions and co-morbidity symptoms are monitored and maintained or improved  12/19/2022 2103 by Maria Antonia Drake RN  Outcome: Progressing  Flowsheets (Taken 12/19/2022 2103)  Care Plan - Patient's Chronic Conditions and Co-Morbidity Symptoms are Monitored and Maintained or Improved:   Collaborate with multidisciplinary team to address chronic and comorbid conditions and prevent exacerbation or deterioration   Monitor and assess patient's chronic conditions and comorbid symptoms for stability, deterioration, or improvement   Update acute care plan with appropriate goals if chronic or comorbid symptoms are exacerbated and prevent overall improvement and discharge     Problem: Nutrition Deficit:  Goal: Optimize nutritional status  12/19/2022 2103 by Maria Antonia Drake RN  Outcome: Progressing  Flowsheets (Taken 12/19/2022 2103)  Nutrient intake appropriate for improving, restoring, or maintaining nutritional needs:   Assess nutritional status and recommend course of action   Recommend appropriate diets, oral nutritional supplements, and vitamin/mineral supplements   Monitor oral intake, labs, and treatment plans   Order, calculate, and assess calorie counts as needed   Recommend, monitor, and adjust tube feedings and TPN/PPN based on assessed needs   Provide specific nutrition education to patient or family as appropriate  Care plan reviewed with patient.   Patient verbalize understanding of the plan of care and contribute to goal setting.

## 2022-12-20 NOTE — PROGRESS NOTES
[facility]  INPATIENT PHYSICAL THERAPY  DAILY NOTE  STRZ RENAL TELEMETRY 6K - 6K-06/006-A  Time In: 1330  Time Out: 1354  Timed Code Treatment Minutes: 24 Minutes  Minutes: 24          Date: 2022  Patient Name: Wilberto Solis,  Gender:  female        MRN: [id number]  : 1955  (69 y.o.)     Referring Practitioner: CARLOS MANUEL Ladd CNP  Diagnosis: dehydration  Additional Pertinent Hx: Per EMR \"Deloris Kyle is a 69 y.o. female who presents to the emergency department for evaluation of, vomiting  Patient presents to the emergency department after being seen in the office because of 4 days of multiple episodes of nausea, emesis, not tolerating oral intake, lower abdominal pain, fatigue, feeling dehydrated; patient was diagnosed with sinusitis being prescribed with doxycycline some days ago, symptoms referred at that time were rhinorrhea, cough, mild shortness of breath; symptoms did not improve and were joint with nausea emesis and abdominal pain. Symptoms low urinary output, dark urine. Was concerned about kidney failure due to history of CKD. \"     Prior Level of Function:  Lives With: Spouse  Type of Home: House  Home Layout: One level  Home Access: Stairs to enter without rails  Entrance Stairs - Number of Steps: 2 MARIANA  Home Equipment: Radha Mei   Bathroom Shower/Tub: Walk-in shower  Bathroom Toilet: Standard  Bathroom Equipment: Shower chair    ADL Assistance: Independent  Homemaking Assistance: Needs assistance  Ambulation Assistance: Independent  Transfer Assistance: Independent  Active : Yes (short distances)  IADL Comments:  completes most cooking and cleaning tasks  Additional Comments: pt was intermittently using a walker prior to feeling weak and sick, has been using it more in the last 2 weeks.     Restrictions/Precautions:  Restrictions/Precautions: General Precautions, Fall Risk  Position Activity Restriction  Other position/activity restrictions: tremors     SUBJECTIVE: Ok to see pt per nursing. Pt in bed when PT arrived, reports being less nauseated since this AM, has pt was vomiting after breakfast this AM. Pt requesting to get back in bed following session. PAIN: denies    Vitals: Vitals not assessed per clinical judgement, see nursing flowsheet    OBJECTIVE:  Bed Mobility:  Rolling to Left: Supervision, X 1, with head of bed raised   Supine to Sit: Supervision, X 1, with head of bed raised  Sit to Supine: Supervision, X 1, with head of bed raised   Scooting: Dependent with hercules    Transfers:  Sit to Stand: Stand By Assistance, X 1  Stand to Sit:Stand By Assistance, X 1    Ambulation:  Stand By Assistance, X 1, with cues for safety, with verbal cues , with increased time for completion  Distance: 100 feet, 12 feet  Surface: Level Tile  Device:Rolling Walker  Gait Deviations:  Slow Anna, Decreased Step Length Bilaterally, and Decreased Gait Speed, tremors noted throughout  Cues for safety  Balance:  Static Sitting Balance:  Supervision  Dynamic Sitting Balance: Supervision  Static Standing Balance: Stand By Assistance  Dynamic Standing Balance: Stand By Assistance  Pt completed toileting, no LOB noted, cues for safety, hand washing completed required cues for maintaining RW in close proximity to self at all times, fair demo. Exercise:  Patient was guided in 1 set(s) 12 reps of exercise to both lower extremities. Ankle pumps, Glut sets, Quad sets, Heelslides, Hip abduction/adduction, and Straight leg raises. Exercises were completed for increased independence with functional mobility. Pt required VC and visual cues for proper technique of exercises with good demo. Functional Outcome Measures: Completed  AM-PAC Inpatient Mobility Raw Score : 20  AM-PAC Inpatient T-Scale Score : 47.67    ASSESSMENT:  Assessment: Patient progressing toward established goals.   Activity Tolerance:  Patient tolerance of treatment: fair. Equipment Recommendations:Equipment Needed: No  Discharge Recommendations: Continue to assess pending progress, Home with Home Health PT, and Patient would benefit from continued PT at discharge  Plan: Current Treatment Recommendations: Strengthening, Balance training, Gait training, Stair training, Functional mobility training, Transfer training, Neuromuscular re-education, Endurance training, Equipment evaluation, education, & procurement, Patient/Caregiver education & training, Safety education & training, Therapeutic activities, Home exercise program  General Plan:  (5x GM)    Patient Education  Patient Education: Plan of Care, Bed Mobility, Equipment Education, Transfers, Gait, Use of Gait [place], Verbal Exercise Instruction,  - Patient Verbalized Understanding, - Patient Requires Continued Education    Goals:  Patient Goals : therapy services HH vs outpatient  Short Term Goals  Time Frame for Short Term Goals: by discharge  Short Term Goal 1: Pt will amb for >100 feet with S with RW for support to return home safely. Short Term Goal 2: Pt will demo sit to/from stand transfers with RW for support with IND to return home safely. Short Term Goal 3: Pt will demo SBA for stair negotiation with rail for support as needed to return home safely. Short Term Goal 4: Pt will demo IND with bed mobility tasks with bed flat to return home safely. Long Term Goals  Time Frame for Long Term Goals : NA due to short ELOS    Following session, patient left in safe position with all fall risk precautions in place. Pt in bed following session, all needs and call light in reach, alarm on.

## 2022-12-20 NOTE — PROGRESS NOTES
[facility]   Nephrology progress note  12/20/2022, 2:11 PM      Pt Name:    Jasmin Kaur  MRN:     [id number]     YOB: 1955  Admit Date:    12/16/2022 10:35 AM    Chief Complaint: Nephrology following for acute kidney injury and metabolic acidosis. Subjective:  Patient seen and examined  No chest pain or shortness of breath  Feels better. Says making more urine output    Objective:  24HR INTAKE/OUTPUT:    Intake/Output Summary (Last 24 hours) at 12/20/2022 1411  Last data filed at 12/20/2022 0548  Gross per 24 hour   Intake 2612.69 ml   Output 1200 ml   Net 1412.69 ml        Admission weight: 114 lb (51.7 kg)  Wt Readings from Last 3 Encounters:   12/20/22 128 lb 4.9 oz (58.2 kg)   12/16/22 114 lb (51.7 kg)   12/12/22 116 lb (52.6 kg)        Vitals :   Vitals:    12/19/22 2304 12/20/22 0238 12/20/22 0730 12/20/22 1128   BP: 132/63 (!) 153/65 (!) 113/54 137/79   Pulse: 82 72 77 79   Resp:  18 18 18   Temp: 98.5 °F (36.9 °C) 98.2 °F (36.8 °C) 98.3 °F (36.8 °C) 98.1 °F (36.7 °C)   TempSrc: Oral Oral Oral Oral   SpO2: 95% 94% 96% 98%   Weight:  128 lb 4.9 oz (58.2 kg)     Height:  5' (1.524 m)         Physical examination  General Appearance:  Well developed.  No distress  Mouth/Throat:  Oral mucosa moist  Neck:  Supple, no JVD  Lungs:  Breath sounds: clear  Heart[de-identified]  S1,S2 heard  Abdomen:  Soft, non - tender  Musculoskeletal:  Edema -no edema noted    Medications:  Infusion:    sodium chloride 100 mL/hr at 12/20/22 0548    sodium chloride       Meds:    [Held by provider] magnesium oxide  400 mg Oral BID    calcium replacement protocol   Other RX Placeholder    calcitRIOL  0.25 mcg Oral Once per day on Sun Tue Thu Sat    docusate sodium  100 mg Oral Daily    senna  1 tablet Oral Nightly    [Held by provider] potassium bicarb-citric acid  40 mEq Oral Daily    amLODIPine  5 mg Oral Daily    aspirin  81 mg Oral Daily    buPROPion  200 mg Oral BID    clopidogrel  75 mg Oral Daily folic acid  063 mcg Oral Daily    montelukast  10 mg Oral Daily    atorvastatin  10 mg Oral Daily    traZODone  50 mg Oral Nightly    polyethylene glycol  17 g Oral Daily    sodium chloride flush  10 mL IntraVENous 2 times per day    heparin (porcine)  5,000 Units SubCUTAneous 3 times per day       Lab Data :  CBC:   Recent Labs     12/18/22  0554 12/19/22  0633 12/19/22  1652 12/20/22  0503   WBC 7.6 6.9  --  7.3   HGB 8.3* 7.7* 8.3* 7.4*   HCT 24.4* 23.2* 24.8* 22.2*   * 97*  --  85*       CMP:  Recent Labs     12/18/22  0554 12/19/22  0633 12/20/22  0503    146* 145   K 3.3*  3.3* 3.9  3.9 3.9  3.9    101 98   CO2 21* 30 28   BUN 88* 86* 83*   CREATININE 7.3* 7.1* 6.6*   GLUCOSE 78 84 80   CALCIUM 6.6* 7.4* 7.0*   MG 1.5* 1.5* 2.2   PHOS 6.3* 6.1* 5.5*       Hepatic:   Recent Labs     12/18/22  0554 12/19/22  0633   LABALBU 3.3* 3.0*   AST 25 23   ALT 15 14   BILITOT 0.5 0.5   ALKPHOS 85 87         Assessment and Plan:  Renal -acute kidney injury most likely secondary to severe volume depletion,  It looked extremely dry we will continue  to aggressively hydrate the patient monitor renal function  Renal function overall slight improvement, rather very slow improvement  Patient had questions with hemoptysis. Send serologic work-up  Await a renal ultrasound scan no acute need for renal replacement therapy  Electrolytes -mild hypernatremia continue half-normal saline  Acid-base status-metabolic acidosis presented with above carbonate infusion  Hypocalcemia corrected calcium reasonable  CKD stage IV with baseline creatinine 1.8-2.0  Hx of fibromyalgia  Essential hypertension stable  Meds reviewed and discussed with patient  and    Irineo Gabriel MD  [facility]    This report has been created using voice recognition software.  It may contain minor errors which are inherent in voice recognition technology

## 2022-12-20 NOTE — CARE COORDINATION
12/20/22, 8:53 AM EST    DISCHARGE ON GOING EVALUATION    Amos Plunkett day: 4  Location: -06/006-A Reason for admit: Dehydration [E86.0]  ERUM (acute kidney injury) ([facility] [address] [N17.9]  Acute renal failure superimposed on chronic kidney disease, unspecified CKD stage, unspecified acute renal failure type ([facility] [address] [N17.9, N18.9]  Nausea and vomiting, unspecified vomiting type [R11.2]   Procedure: 12/17: CT chest: Minimal atelectatic/fibrotic stranding left lung base and lingula. Small hiatus hernia  Barriers to Discharge: 0.45%IVF, Nephro following, creat 6.6 today-down from 8.2 on arrival, hypocalcemia-0.88  PCP: Leonardo Jackman MD  Readmission Risk Score: 18.1%  Patient Goals/Plan/Treatment Preferences: Plan return home with spouse.  Per previous note may want OP therapy at discharge if weakness doesn't improve

## 2022-12-21 PROBLEM — R10.9 ABDOMINAL PAIN: Status: ACTIVE | Noted: 2022-12-21

## 2022-12-21 PROBLEM — F32.A DEPRESSION: Status: ACTIVE | Noted: 2022-12-21

## 2022-12-21 PROBLEM — D64.9 ACUTE ON CHRONIC ANEMIA: Status: ACTIVE | Noted: 2022-12-21

## 2022-12-21 PROBLEM — R53.1 GENERALIZED WEAKNESS: Status: ACTIVE | Noted: 2022-12-21

## 2022-12-21 PROBLEM — J06.9 UPPER RESPIRATORY TRACT INFECTION: Status: ACTIVE | Noted: 2022-12-21

## 2022-12-21 PROBLEM — E87.0 HYPERNATREMIA: Status: ACTIVE | Noted: 2022-12-21

## 2022-12-21 PROBLEM — R11.2 NAUSEA AND VOMITING: Status: ACTIVE | Noted: 2022-12-21

## 2022-12-21 PROBLEM — I50.32 CHRONIC DIASTOLIC CHF (CONGESTIVE HEART FAILURE) (HCC): Status: ACTIVE | Noted: 2022-12-21

## 2022-12-21 LAB
ANION GAP SERPL CALCULATED.3IONS-SCNC: 15 MEQ/L (ref 8–16)
BASOPHILS # BLD: 0.6 %
BASOPHILS ABSOLUTE: 0 THOU/MM3 (ref 0–0.1)
BUN BLDV-MCNC: 81 MG/DL (ref 7–22)
CALCIUM IONIZED: 0.93 MMOL/L (ref 1.12–1.32)
CALCIUM SERPL-MCNC: 7.5 MG/DL (ref 8.5–10.5)
CHLORIDE BLD-SCNC: 101 MEQ/L (ref 98–111)
CO2: 25 MEQ/L (ref 23–33)
CREAT SERPL-MCNC: 7.2 MG/DL (ref 0.4–1.2)
DIFFERENTIAL TYPE: ABNORMAL
EOSINOPHIL # BLD: 1 %
EOSINOPHILS ABSOLUTE: 0.1 THOU/MM3 (ref 0–0.4)
ERYTHROCYTE [DISTWIDTH] IN BLOOD BY AUTOMATED COUNT: 12.9 % (ref 11.5–14.5)
ERYTHROCYTE [DISTWIDTH] IN BLOOD BY AUTOMATED COUNT: 44.2 FL (ref 35–45)
GFR SERPL CREATININE-BSD FRML MDRD: 6 ML/MIN/1.73M2
GLUCOSE BLD-MCNC: 74 MG/DL (ref 70–108)
HCT VFR BLD CALC: 21 % (ref 37–47)
HCT VFR BLD CALC: 21.3 % (ref 37–47)
HCT VFR BLD CALC: 21.8 % (ref 37–47)
HEMOGLOBIN: 6.7 GM/DL (ref 12–16)
HEMOGLOBIN: 7 GM/DL (ref 12–16)
HEMOGLOBIN: 7 GM/DL (ref 12–16)
IMMATURE GRANS (ABS): 0.04 THOU/MM3 (ref 0–0.07)
IMMATURE GRANULOCYTES: 0.6 %
LYMPHOCYTES # BLD: 32.7 %
LYMPHOCYTES ABSOLUTE: 2.3 THOU/MM3 (ref 1–4.8)
MAGNESIUM: 2 MG/DL (ref 1.6–2.4)
MCH RBC QN AUTO: 30.2 PG (ref 26–33)
MCHC RBC AUTO-ENTMCNC: 31.9 GM/DL (ref 32.2–35.5)
MCV RBC AUTO: 94.6 FL (ref 81–99)
MONOCYTES # BLD: 8.1 %
MONOCYTES ABSOLUTE: 0.6 THOU/MM3 (ref 0.4–1.3)
NUCLEATED RED BLOOD CELLS: 0 /100 WBC
PATHOLOGIST REVIEW: ABNORMAL
PHOSPHORUS: 5.2 MG/DL (ref 2.4–4.7)
PLATELET # BLD: 85 THOU/MM3 (ref 130–400)
PLATELET ESTIMATE: ABNORMAL
PMV BLD AUTO: 11.8 FL (ref 9.4–12.4)
POTASSIUM REFLEX MAGNESIUM: 4.2 MEQ/L (ref 3.5–5.2)
RBC # BLD: 2.22 MILL/MM3 (ref 4.2–5.4)
SCAN OF BLOOD SMEAR: NORMAL
SEG NEUTROPHILS: 57 %
SEGMENTED NEUTROPHILS ABSOLUTE COUNT: 4 THOU/MM3 (ref 1.8–7.7)
SODIUM BLD-SCNC: 141 MEQ/L (ref 135–145)
WBC # BLD: 7 THOU/MM3 (ref 4.8–10.8)

## 2022-12-21 PROCEDURE — 85018 HEMOGLOBIN: CPT

## 2022-12-21 PROCEDURE — 2580000003 HC RX 258: Performed by: INTERNAL MEDICINE

## 2022-12-21 PROCEDURE — 82330 ASSAY OF CALCIUM: CPT

## 2022-12-21 PROCEDURE — 80048 BASIC METABOLIC PNL TOTAL CA: CPT

## 2022-12-21 PROCEDURE — 99233 SBSQ HOSP IP/OBS HIGH 50: CPT | Performed by: INTERNAL MEDICINE

## 2022-12-21 PROCEDURE — 84100 ASSAY OF PHOSPHORUS: CPT

## 2022-12-21 PROCEDURE — 92523 SPEECH SOUND LANG COMPREHEN: CPT

## 2022-12-21 PROCEDURE — 6360000002 HC RX W HCPCS: Performed by: PHYSICIAN ASSISTANT

## 2022-12-21 PROCEDURE — 83735 ASSAY OF MAGNESIUM: CPT

## 2022-12-21 PROCEDURE — 6370000000 HC RX 637 (ALT 250 FOR IP): Performed by: PHYSICIAN ASSISTANT

## 2022-12-21 PROCEDURE — 85025 COMPLETE CBC W/AUTO DIFF WBC: CPT

## 2022-12-21 PROCEDURE — 85014 HEMATOCRIT: CPT

## 2022-12-21 PROCEDURE — 1200000003 HC TELEMETRY R&B

## 2022-12-21 PROCEDURE — 6360000002 HC RX W HCPCS

## 2022-12-21 PROCEDURE — 36415 COLL VENOUS BLD VENIPUNCTURE: CPT

## 2022-12-21 PROCEDURE — 6370000000 HC RX 637 (ALT 250 FOR IP)

## 2022-12-21 RX ADMIN — BUPROPION HYDROCHLORIDE 200 MG: 100 TABLET, FILM COATED, EXTENDED RELEASE ORAL at 09:50

## 2022-12-21 RX ADMIN — ATORVASTATIN CALCIUM 10 MG: 10 TABLET, FILM COATED ORAL at 09:51

## 2022-12-21 RX ADMIN — SODIUM CHLORIDE: 4.5 INJECTION, SOLUTION INTRAVENOUS at 01:40

## 2022-12-21 RX ADMIN — MONTELUKAST SODIUM 10 MG: 10 TABLET ORAL at 20:14

## 2022-12-21 RX ADMIN — HEPARIN SODIUM 5000 UNITS: 5000 INJECTION INTRAVENOUS; SUBCUTANEOUS at 06:06

## 2022-12-21 RX ADMIN — AMLODIPINE BESYLATE 5 MG: 5 TABLET ORAL at 09:50

## 2022-12-21 RX ADMIN — TRAZODONE HYDROCHLORIDE 50 MG: 50 TABLET ORAL at 20:14

## 2022-12-21 RX ADMIN — SENNOSIDES 8.6 MG: 8.6 TABLET, COATED ORAL at 20:14

## 2022-12-21 RX ADMIN — POLYETHYLENE GLYCOL 3350 17 G: 17 POWDER, FOR SOLUTION ORAL at 09:50

## 2022-12-21 RX ADMIN — SODIUM CHLORIDE: 4.5 INJECTION, SOLUTION INTRAVENOUS at 22:41

## 2022-12-21 RX ADMIN — BUPROPION HYDROCHLORIDE 200 MG: 100 TABLET, FILM COATED, EXTENDED RELEASE ORAL at 20:14

## 2022-12-21 RX ADMIN — DOCUSATE SODIUM 100 MG: 100 CAPSULE, LIQUID FILLED ORAL at 09:50

## 2022-12-21 RX ADMIN — CETIRIZINE HYDROCHLORIDE 5 MG: 5 TABLET ORAL at 20:14

## 2022-12-21 RX ADMIN — FOLIC ACID 500 MCG: 1 TABLET ORAL at 09:50

## 2022-12-21 RX ADMIN — PROMETHAZINE HYDROCHLORIDE 6.25 MG: 25 INJECTION INTRAMUSCULAR; INTRAVENOUS at 01:34

## 2022-12-21 NOTE — PLAN OF CARE
Problem: Discharge Planning  Goal: Discharge to home or other facility with appropriate resources  Outcome: Progressing  Flowsheets (Taken 12/20/2022 2303)  Discharge to home or other facility with appropriate resources:   Identify barriers to discharge with patient and caregiver   Arrange for needed discharge resources and transportation as appropriate   Identify discharge learning needs (meds, wound care, etc)   Arrange for interpreters to assist at discharge as needed   Refer to discharge planning if patient needs post-hospital services based on physician order or complex needs related to functional status, cognitive ability or social support system     Problem: Pain  Goal: Verbalizes/displays adequate comfort level or baseline comfort level  Outcome: Progressing  Flowsheets (Taken 12/20/2022 2303)  Verbalizes/displays adequate comfort level or baseline comfort level:   Encourage patient to monitor pain and request assistance   Assess pain using appropriate pain scale   Administer analgesics based on type and severity of pain and evaluate response   Implement non-pharmacological measures as appropriate and evaluate response   Consider cultural and social influences on pain and pain management   Notify Licensed Independent Practitioner if interventions unsuccessful or patient reports new pain     Problem: Skin/Tissue Integrity  Goal: Absence of new skin breakdown  Description: 1. Monitor for areas of redness and/or skin breakdown  2. Assess vascular access sites hourly  3. Every 4-6 hours minimum:  Change oxygen saturation probe site  4. Every 4-6 hours:  If on nasal continuous positive airway pressure, respiratory therapy assess nares and determine need for appliance change or resting period.   Outcome: Progressing     Problem: Safety - Adult  Goal: Free from fall injury  Outcome: Progressing  Flowsheets (Taken 12/20/2022 2303)  Free From Fall Injury:   Instruct family/caregiver on patient safety   Based on caregiver fall risk screen, instruct family/caregiver to ask for assistance with transferring infant if caregiver noted to have fall risk factors     Problem: ABCDS Injury Assessment  Goal: Absence of physical injury  Outcome: Progressing  Flowsheets (Taken 12/20/2022 2303)  Absence of Physical Injury: Implement safety measures based on patient assessment     Problem: Genitourinary - Adult  Goal: Absence of urinary retention  Outcome: Progressing  Flowsheets (Taken 12/20/2022 2303)  Absence of urinary retention:   Assess patients ability to void and empty bladder   Monitor intake/output and perform bladder scan as needed   Place urinary catheter per Licensed Independent Practitioner order if needed   Discuss with Licensed Independent Practitioner  medications to alleviate retention as needed   Discuss catheterization for long term situations as appropriate     Problem: Metabolic/Fluid and Electrolytes - Adult  Goal: Electrolytes maintained within normal limits  Outcome: Progressing  Flowsheets (Taken 12/20/2022 2303)  Electrolytes maintained within normal limits:   Monitor labs and assess patient for signs and symptoms of electrolyte imbalances   Administer electrolyte replacement as ordered   Monitor response to electrolyte replacements, including repeat lab results as appropriate   Fluid restriction as ordered   Instruct patient on fluid and nutrition restrictions as appropriate  Goal: Hemodynamic stability and optimal renal function maintained  Outcome: Progressing  Flowsheets (Taken 12/20/2022 2303)  Hemodynamic stability and optimal renal function maintained:   Monitor labs and assess for signs and symptoms of volume excess or deficit   Monitor intake, output and patient weight   Monitor urine specific gravity, serum osmolarity and serum sodium as indicated or ordered   Monitor response to interventions for patient's volume status, including labs, urine output, blood pressure (other measures as available) Encourage oral intake as appropriate   Instruct patient on fluid and nutrition restrictions as appropriate     Problem: Chronic Conditions and Co-morbidities  Goal: Patient's chronic conditions and co-morbidity symptoms are monitored and maintained or improved  Outcome: Progressing  Flowsheets (Taken 12/20/2022 2303)  Care Plan - Patient's Chronic Conditions and Co-Morbidity Symptoms are Monitored and Maintained or Improved:   Monitor and assess patient's chronic conditions and comorbid symptoms for stability, deterioration, or improvement   Collaborate with multidisciplinary team to address chronic and comorbid conditions and prevent exacerbation or deterioration   Update acute care plan with appropriate goals if chronic or comorbid symptoms are exacerbated and prevent overall improvement and discharge     Problem: Nutrition Deficit:  Goal: Optimize nutritional status  Outcome: Progressing  Flowsheets (Taken 12/20/2022 2303)  Nutrient intake appropriate for improving, restoring, or maintaining nutritional needs:   Assess nutritional status and recommend course of action   Monitor oral intake, labs, and treatment plans   Recommend appropriate diets, oral nutritional supplements, and vitamin/mineral supplements   Order, calculate, and assess calorie counts as needed   Recommend, monitor, and adjust tube feedings and TPN/PPN based on assessed needs   Provide specific nutrition education to patient or family as appropriate  Care plan reviewed with patient. Patient verbalize understanding of the plan of care and contribute to goal setting.

## 2022-12-21 NOTE — PROGRESS NOTES
[facility]  SPEECH THERAPY  STRZ RENAL TELEMETRY 6K  Speech - Language - Cognitive Evaluation    SLP Individual Minutes  Time In: 9157  Time Out: 5883  Minutes: 17  Timed Code Treatment Minutes: 0 Minutes       Date: 2022  Patient Name: Ashley Ham      CSN: [id number]   : 1955  (69 y.o.)  Gender: female   Referring Physician:  BRITANY Dejesus    Diagnosis: ERUM (acute kidney injury) [facility])  Precautions: Fall Risk   History of Present Illness/Injury: Patient admitted with above diagnosis. Per chart review, \"\"Deloris Lucas is a 69 y.o. female with PMHx of CVA, depression, who presented to [facility] with chief complaint of dehydration, N/V. Patient states that she was seen by her PCP on Monday,  for congestion, sore throat, fatigue, weakness on going for >2 weeks. She was started on PO doxycycline. She continues to feel congested but has no sinus pain or pressure, no cough, afebrile. Approx 2 days after starting doxy, patient developed N/V. Reports very poor PO intake, states she has only been eating ice chips. Reports lower abdominal pain, last BM 3 days ago. Reports decreased urine output, states it is very dark. Denies dysuria, urgency, frequency. No chest pain, SOB, f/c. Pt follows with Dr. Jose Byrne for CKD. Admitted to med/tele for further management. \"     Of note, Patient reporting that she has had OP speech therapy services several times in the past, most recently discharged from [facility] 3 months ago. Patient reporting that PCP has recently recommended she resume OP PT/OT/ST services. Patient with history of CVA (2 larger events and 2 smaller events), as well as recent concussion, all per patient report. ST consulted to complete [id number] evaluation to assess current cognitive-linguistic skills and update POC as clinically indicated.      Past Medical History:   Diagnosis Date    Allergic rhinitis     Anxiety     CVA (cerebral infarction)     Depression     Fibromyalgia     Headache(784.0)     Hyperlipidemia     Hypertension     Irritable bowel syndrome     Kidney failure     Unspecified cerebral artery occlusion with cerebral infarction     Unspecified sleep apnea        Pain: No pain reported. Subjective:  RN Jaguar G[employer] approved completion of evaluation this date. Upon arrival to room, patient awake in recliner. Patient upset re: having to start dialysis; however, agreeable to complete evaluation. Patient highly lethargic throughout evaluation. SOCIAL HISTORY:   Living Arrangements: home with    Work History: Retired - used to work at [place] Level: [employer] Status: Active   Finance Management: Dependent/Unable -  manages   Medication Management: Dependent/Unable -  manages   ADL's: Assistance Required. Hobbies: gambling, watching TV/movies, playing card games   Vision Status: Prescription lenses   Hearing: [employer] - no assistive hearing devices. Type of Home: House  Home Layout: One level  Home Access: Stairs to enter without rails  Entrance Stairs - Number of Steps: 2 MARIANA  Home Equipment: Kelly Cradle, Rollator    SPEECH / VOICE:  Speech and Voice appear to be grossly intact for basic and complex daily communication    LANGUAGE:  Receptive:  Receptive language skills appear to be grossly intact for basic and complex daily communication. Expressive:  Expressive language skills appear to be grossly intact for basic and complex daily communication. COGNITION:  [place] Cognitive Assessment [facility]) version 7.3 completed. Patient scored 14/30*. Normal is greater than or equal to 26/30.   *Inclusion of +1 point given highest level of education achieved less than/equal to 12th grade or GED with limited-0 post-secondary schooling     Orientation: 5/6   Immediate Recall: Trial 1: 3/5, Trial 2: 3/5  Short-Term Recall: 0/5 independently, 2/5 given categorical cue, 1/5 given FO2   Divergent Namin words/minute (target = 11 words/minute)   Reasonin/2   Thought Organization: 0/1   Attention: 0/1  Math Computation: 1/3  Executive Functionin5    SWALLOWING:  Current Diet: Regular and Thin Liquids   WFL - See clinical swallow evaluation completed on 22 for further details          RECOMMENDATIONS/ASSESSMENT:  DIAGNOSTIC IMPRESSIONS:  Patient presents with a moderate cognitive-linguistic impairment characterized by a score of 14/30 on the [facility] with deficits noted within the domains identified above. Patient's expressive and receptive language appear WFL. No dysarthria or dysphonia with speech intelligibility approximating 100%. Patient would greatly benefit from continued skilled ST services to address identified deficits in order to make successful return home and resume ADL tasks with least amount of supervision/assistance. Rehabilitation Potential: good  Discharge Recommendations: Outpatient Speech Therapy    EDUCATION:  Learner: Patient  Education:  Reviewed results and recommendations of this evaluation, Reviewed ST goals and Plan of Care, Reviewed recommendations for follow-up, Education Related to Potential Risks and Complications Due to Impairment/Illness/Injury, and Education Related to [employer] and Wellness  Evaluation of Education: [employer] understanding and Needs further instruction    PLAN:  Skilled SLP intervention on acute care 3-5 x per week or until goals met and/or pt plateaus in function. Specific interventions for next session may include: recall, problem solving, reasoning . PATIENT GOAL:    Did not state. Will further assess during treatment. SHORT TERM GOALS:  Short Term Goals  Time Frame for Short Term Goals: 2 weeks  Goal 1: Patient will complete verbal/visual reasoning, problem solving, and safety tasks with 80% accuracy given mod cues to improve safety and awareness within environment.   Goal 2: Patient will complete recall tasks (immediate, delayed, working) with focus on memory strategies with 75% accuracy given mod cues to improve recall of pertinent information. Goal 3: Patient will complete thought organization tasks (divergent/convergent/responsive naming, sequencing, calendar use/planning) with 80% accuracy given mod cues to improve processing speed and organization during ADL completion. Goal 4: Patient will complete attention tasks (sustained, selective, alternating, divided) with no more than 8 errors/redirections within task to improve attention during ADL completion and multi-tasking.     LONG TERM GOALS:  No long term goals recommended d/t estuardo Sparks ([facility] [address], [address]

## 2022-12-21 NOTE — PROGRESS NOTES
Hospitalist Progress Note      Patient: Kiet Ledezma      Unit/Bed:6K-06/006-A    YOB: 1955    MRN: [id number]       Acct: [de-identified]     PCP: Michael Stauffer MD    Date of Admission: 12/16/2022    Date/Time of Evaluation:  12/21/2022 at 10:54 AM    Assessment/Plan:    ERUM on CKD stage IV: apprec renal assist- ?etiology but suspect due to severe volume depletion per renal  Creat trending down to 6.3 on 12/20 but up again to 7.2 on 12/21 but overall down from 8.2 on 12/16 --> Baseline creatinine range ~ 1.8-2.0 in past year   IVF adjusted from bicarb gtt (12/16 - 12/19) and changed to NS 12/19 at 100 mL and creat cont worsen 12/21 --> ?HD if no improvement  Renal u/s 12/20/22 with echogenic bilateral kidneys c/w chronic kidney disease  Serologic w/u (p) per renal   Avoid nephrotoxic agents, renally dose medications - s/p bumex 2 mg po x 1 on 12/18  Strict I&O's  ?renal bx  High AGMA, improving:   Likely due to ERUM/dehydration. On bicarb drip 12/16 - 12/19 as improved to WNL 12/19 and changed to NS 12/20 per renal   No signs of infxn -- Lactic acid 0.8 12/16 and 1.1 on 12/20  Nephrology following  Hemoptysis, resolved  Patient noted to have 3 episodes of hemoptysis with coughing. CT chest 12/17/22 with minimal atelectatic/fibrotic stranding left lung base and lingula.   Sputum culture ordered but unable to obtain  Pulmonology consulted, appreciate recs: \"likely combination of recent URI dry humidity and use of plavix and ASA, Pulmonary service will sign off no follow-up needed \"   Hypokalemia   Daily EfferK 40 mEq started 12/17 -> held since 12/20 for normal K - monitor alondra with ERUM  Daily lab  Hypocalcemia  Daily calcium replacement prn  Daily iCal - ?need PTH testing  Hypernatremia  Secondary to #1, dehydration, nephrology following  Hypomagnesemia  Was on Po mag oxide starting 12/17 -> also given IV Mg 12/19 and then po Mg stopped 12/19 due to improved Mg  Cont monitor Mg  Hyperphosphatemia, improving:  Per renal no need for phosphorus binders at this time. D/t ERUM, should resolve as ERUM improves. Daily phosphorus level. Elevated troponin   0.033 on admission x 1 12/16 -> not repeated, EKG no acute ischemic changes. Patient denies chest pain - likely related to ERUM and electrolyte abn   Monitor for cardiac sx - no recent ischemic w/u and last echo 2/2022 with normal EF, no WMA  Abdominal pain, nausea, vomiting:   Patient reports N/V, which may be from limited intake of food. Reports abdominal pain --> ?due to ERUM  LFT 12/19/22 WNL  CT chest 12/17/22 with \"distended gallbladder likely related to not having eaten recently\" --> ??need GI imaging  Stool softners, as needed miralax   As needed antiemetics  URI:   CXR on admission negative for pulmonary infiltrates or effusions. Patient has been on doxycycline started on 12/12. No evidence of bacterial infection, thus no further antibiotics. Supportive care. Acute on chronic macrocytic anemia  Trending down during admission and Down to 6.7 on 12/21 am -> repeat 7.0  12/21 thus hold on blood transfusion unless renal feels necessary but overall pt asx - down from 10.3 on 12/16 --> Baseline hemoglobin range ~10-11.    ?some slight loss with hemoptysis and per pulm felt due to epistaxis -> has resolved at least since 12/21  ASA, Plavix held starting 12/21 per renal for possible procedures needed   Suspect likely dilutional component from IVF compounded by poor kidney fxn. FOBT negative 12/19  Iron studies notable for ferritin 402, iron 145, iron sat 90, TIBC less than 162, folate normal, vitamin B12 greater than 2000  Trend and monitor CBC. ?hold heparin for DVT proph  Chronic HFpEF, compensated:   Echo (2/15/2022) notable for EF 55 to 60%, no WMA, G1 DD, mild tricuspid regurg. Continue ASA, statin, Plavix, amlodipine.     Daily weights, strict I&O's -- fluid mgmt per renal with ERUM  Cardiac diet, 2G sodium  Essential hypertension, controlled:   Continue amlodipine  Monitor and adjust prn  Hyperlipidemia:   Cont home Statin  Depression/anxiety:   Continue Wellbutrin, trazodone  ?on librax at home  History of CVA  No new sx  Continued on aspirin, Plavix, statin -> ASA, plavix held starting 12/21 for possible renal procedures  Generalized weakness  Pt/OT following - monitor progress             Chief Complaint: Dehydration     Hospital Course:   Per CNP Carole Nab note 12/20 \"Per HPI documented 12/16/2022: Alexandria Padilla is a 69 y.o. female with PMHx of CVA, depression, who presented to [facility] with chief complaint of dehydration, N/V. Patient states that she was seen by her PCP on Monday, 12/12 for congestion, sore throat, fatigue, weakness on going for >2 weeks. She was started on PO doxycycline. She continues to feel congested but has no sinus pain or pressure, no cough, afebrile. Approx 2 days after starting doxy, patient developed N/V. Reports very poor PO intake, states she has only been eating ice chips. Reports lower abdominal pain, last BM 3 days ago. Reports decreased urine output, states it is very dark. Denies dysuria, urgency, frequency. No chest pain, SOB, f/c. Pt follows with Dr. Zaida Cuadra for CKD. Admitted to med/tele for further management. \"     12/17/22: Resumed care of patient today. Patient afebrile, satting 94% on room air, with hemodynamically stable vital signs. Patient stating she feels weak and tired this morning. States that she still feels nauseous and when she ate some breakfast this morning she started dry heaving and almost vomited. States that she has very little appetite. Also reported some intermittent abdominal cramping. States she is passing gas. States her last bowel movement was Tuesday. Stool softeners added. Multiple electrolyte derangements as noted above. Creatinine 7.3 today, down from 8.2. Discussed case with Dr. Waleska Henao nephrology, who recommend decreasing bicarb drip to rate of 100 mL/hr. Patient making urine 900 mL OP overnight. 12/18/22: Afebrile, hemodynamically stable. No acute events overnight. Patient stating she feels a little better this morning but still feels weak. States that she has not had any nausea or vomiting since yesterday. States she had 2 medium solid BMs and no longer has abdominal pain. States she ate all her breakfast.  Creatinine at 7.3 today, no improvement. Ionized calcium still remains low, will continue to replete. Potassium magnesium still low, will continue to replete. Continue IV bicarb drip.     12/19/22: Afebrile, satting 97% on room air, hemodynamically stable. Patient states she is feeling better. Reports no more hemoptysis. States that she still having some nausea and I eating much of her meals. Denies vomiting and abdominal pains. No other complaints. Creatinine 7.1 today. Slow improvement. Continue to correct electrolyte derangements as noted above. Continue IV bicarb drip     12/20/22: Hemodynamically stable. No acute events. Creatinine downtrending, 6.6 today. H&H fluctuating but stable. Phosphorus coming down. Ionized calcium still low, will replace again today. Potassium and magnesium okay will withhold replacement today. Patient stated that she did have acute episode of nausea followed by vomiting earlier this morning after eating her breakfast.  Currently states that she does not feel nauseous and has no complaints at this time. Continue IV fluids. \"    Subjective/HPI:   -- 12/21/2022  --> pt states she didn't have the n/v episode this am after eating breakfast and getting up to BR - took phenergan prior and helped this am.  600 mL u/o last 24 hrs. ?accurate I/O with +7L since admission. Little abd pain diffusely. Denies cp, sob. Denies f/c.  Abdias po little better this am.  On RA. Afebrile. Ambulating better - denies lightheaded/dizziness.   Last BM 12/20 x 1      PMH, SURGICAL HX, FH, SOCIAL HX reviewed and updated as needed. Medications:  Reviewed    Infusion Medications    sodium chloride 100 mL/hr at 12/21/22 0601    sodium chloride       Scheduled Medications    [Held by provider] magnesium oxide  400 mg Oral BID    calcium replacement protocol   Other RX Placeholder    calcitRIOL  0.25 mcg Oral Once per day on Sun Tue Thu Sat    docusate sodium  100 mg Oral Daily    senna  1 tablet Oral Nightly    [Held by provider] potassium bicarb-citric acid  40 mEq Oral Daily    amLODIPine  5 mg Oral Daily    [Held by provider] aspirin  81 mg Oral Daily    buPROPion  200 mg Oral BID    [Held by provider] clopidogrel  75 mg Oral Daily    folic acid  832 mcg Oral Daily    montelukast  10 mg Oral Daily    atorvastatin  10 mg Oral Daily    traZODone  50 mg Oral Nightly    polyethylene glycol  17 g Oral Daily    sodium chloride flush  10 mL IntraVENous 2 times per day    heparin (porcine)  5,000 Units SubCUTAneous 3 times per day     PRN Meds: promethazine, chlordiazePOXIDE-clidinium, fluticasone, simethicone, sodium chloride flush, sodium chloride, ondansetron **OR** ondansetron, acetaminophen **OR** acetaminophen, dextromethorphan-guaiFENesin, cetirizine, hydrALAZINE      Intake/Output Summary (Last 24 hours) at 12/21/2022 1054  Last data filed at 12/21/2022 0601  Gross per 24 hour   Intake 2237.21 ml   Output 600 ml   Net 1637.21 ml       Diet:  ADULT ORAL NUTRITION SUPPLEMENT; Breakfast, Dinner; Standard 4 oz Oral Supplement  ADULT DIET; Regular; 4 carb choices (60 gm/meal); Low Fat/Low Chol/High Fiber/2 gm Na; Less than 60 gm    Exam:  BP (!) 110/90   Pulse 98   Temp 98.6 °F (37 °C) (Oral)   Resp 18   Ht 5' (1.524 m)   Wt 128 lb (58.1 kg)   SpO2 97%   BMI 25.00 kg/m²     General appearance: No apparent distress, appears older than stated age and cooperative. Oriented x 3. HEENT: Pupils equal, round, and reactive to light. Conjunctivae/corneas clear. MM dry with some cracking on lips - no vesicles.   Neck: Supple, with full range of motion. Trachea midline. Respiratory:  Normal respiratory effort. Diminished but Clear to auscultation, bilaterally without Rales/Wheezes/Rhonchi. No respiratory distress or accessory muscle use. Cardiovascular: Regular rate and rhythm with normal S1/S2, 2/6 DAVONTE LUSB,  No JVD. Abdomen: Soft, +TTP diffusely alondra left sided, non-distended with normal bowel sounds. No rebound or guarding  Musculoskeletal: No clubbing, cyanosis or edema bilaterally. Full range of motion without deformity. No calf tenderness palpation  Skin: Skin color, texture, turgor normal.  No rashes or lesions. Neurologic:  Neurovascularly intact without any focal sensory/motor deficits. Cranial nerves: II-XII intact, grossly non-focal.  Psychiatric: Alert and oriented, flat affect, thought content appropriate, normal insight  Capillary Refill: Brisk,< 3 seconds   Peripheral Pulses: +2 palpable, equal bilaterally       All labs reviewed and interpreted by me:  Labs:   Recent Labs     12/19/22  0633 12/19/22  1652 12/20/22  0503 12/21/22  0556 12/21/22  0950   WBC 6.9  --  7.3 7.0  --    HGB 7.7*   < > 7.4* 6.7* 7.0*   HCT 23.2*   < > 22.2* 21.0* 21.8*   PLT 97*  --  85* 85*  --     < > = values in this interval not displayed. Recent Labs     12/19/22  0633 12/20/22  0503 12/20/22  1504 12/21/22  0556   * 145 142 141   K 3.9  3.9 3.9  3.9 4.1 4.2    98 98 101   CO2 30 28 25 25   BUN 86* 83* 82* 81*   CREATININE 7.1* 6.6* 6.3* 7.2*   CALCIUM 7.4* 7.0* 7.6* 7.5*   PHOS 6.1* 5.5*  --  5.2*     Recent Labs     12/19/22  0633   AST 23   ALT 14   BILITOT 0.5   ALKPHOS 87     No results for input(s): INR in the last 72 hours. No results for input(s): Curlie Lat in the last 72 hours.     Urinalysis:      Lab Results   Component Value Date/Time    NITRU NEGATIVE 12/16/2022 05:20 PM    WBCUA 10-15 12/16/2022 05:20 PM    BACTERIA NONE SEEN 12/16/2022 05:20 PM    RBCUA 5-10 12/16/2022 05:20 PM    BLOODU TRACE 12/16/2022 05:20 PM    SPECGRAV 1.010 12/16/2022 05:20 PM    GLUCOSEU Negative 07/16/2021 04:09 PM    GLUCOSEU NEGATIVE 06/09/2020 03:03 PM       All radiology images and reports reviewed and interpreted by me:  Radiology:  US RENAL COMPLETE   Final Result   Impression:   Echogenic bilateral kidneys, this represents chronic kidney disease. This document has been electronically signed by: Marcello Tompkins MD on    12/20/2022 08:27 PM      CT CHEST WO CONTRAST   Final Result   Minimal atelectatic/fibrotic stranding left lung base and lingula. Small hiatus hernia. Distended gallbladder, likely related to not having eaten recently. **This report has been created using voice recognition software. It may contain minor errors which are inherent in voice recognition technology. **      Final report electronically signed by Dr. Bernadette Wilde on 12/17/2022 5:56 PM      XR CHEST (2 VW)   Final Result   1. No interval change since previous study dated 9/18/2021. Sherryle Moder **This report has been created using voice recognition software. It may contain minor errors which are inherent in voice recognition technology. **      Final report electronically signed by DR Edmar Castaneda on 12/16/2022 11:36 AM          Diet: ADULT ORAL NUTRITION SUPPLEMENT; Breakfast, Dinner; Standard 4 oz Oral Supplement  ADULT DIET; Regular; 4 carb choices (60 gm/meal); Low Fat/Low Chol/High Fiber/2 gm Na; Less than 60 gm    Smith: no    Microbiology:  throat cx 12/16 (-)    Covid 12/16 (-), influenza 12/16 (-)    Urine cx 12/17 = contaminants    Tele review last 24 hrs:  SR, no arrhythmias    DVT prophylaxis: [] Lovenox                                 [] SCDs                                 [x] SQ Heparin                                 [] Encourage ambulation           [] Already on Anticoagulation     Disposition:    [x] Home with ? [place]       [] TCU       [] Rehab       [] Psych       [] SNF       [] [place]       [] Other-    Code Status: Full Code    PT/OT Eval Status: following      Electronically signed by SUNG CLAY MD on 12/21/2022 at 10:54 AM

## 2022-12-21 NOTE — CARE COORDINATION
12/21/22, 12:12 PM EST    [address] day: 5  Location: [facility]06/006-A Reason for admit: Dehydration [E86.0]  [facility] [N17.9]  Acute renal failure superimposed on chronic kidney disease, unspecified CKD stage, unspecified acute renal failure type ([facility] [N17.9, N18.9]  Nausea and vomiting, unspecified vomiting type [R11.2]   Barriers to Discharge: creat 7.2, ical 0.93, Hgb 7.0. IVF, lyte replacement. Nephrology following, probable need for HD per patient statement (no documentation available). PCP: Sadia Yoder MD  Readmission Risk Score: 18.3%  Patient Goals/Plan/Treatment Preferences: Home with . Monitor for OP HD needs. Patient also feels she may want OP therapy ordered at discharge.

## 2022-12-21 NOTE — PROGRESS NOTES
[address] [facility]  OCCUPATIONAL THERAPY MISSED TREATMENT NOTE  [facility] RENAL TELEMETRY 6K  6K-006-A      Date: 2022  Patient Name: Glen Rivas        CSN: [id number]   : 1955  (69 y.o.)  Gender: female   Referring Practitioner: CARLOS MANUEL Dennis CNP  Diagnosis: Acute kidney injury superimposed on chronic kidney disease         REASON FOR MISSED TREATMENT: Patient Refused. OT will check back as time allows.

## 2022-12-21 NOTE — PROGRESS NOTES
[facility]  PHYSICAL THERAPY MISSED TREATMENT NOTE  [facility] RENAL TELEMETRY 6K    Date: 2022  Patient Name: Jai Mueller        MRN: [id number]   : 1955  (69 y.o.)  Gender: female   Referring Practitioner: CARLOS MANUEL Pham CNP  Diagnosis: dehydration         REASON FOR MISSED TREATMENT:  Patient refused treatment. Pt in bed when PT arrived, reports she is now going to be on dialysis and is upset. Pt declined therapy for the day. Will check back next available date.

## 2022-12-21 NOTE — FLOWSHEET NOTE
12/21/22 1008   Safe Environment   Safety Measures   (Virtual RN rounds complete)   PT sitting upright in bed, No needs identified at this time. No acute distress noted. Elizabeth Lind

## 2022-12-21 NOTE — PROGRESS NOTES
[facility]   Nephrology progress note  12/21/2022, 1:57 PM      Pt Name:    Raiza Rodriguez  MRN:     [id number]     YOB: 1955  Admit Date:    12/16/2022 10:35 AM    Chief Complaint: Nephrology following for acute kidney injury and metabolic acidosis. Subjective:  Patient seen and examined  No chest pain or shortness of breath  Some urine output noted    Objective:  24HR INTAKE/OUTPUT:    Intake/Output Summary (Last 24 hours) at 12/21/2022 1357  Last data filed at 12/21/2022 0601  Gross per 24 hour   Intake 2237.21 ml   Output 600 ml   Net 1637.21 ml        Admission weight: 114 lb (51.7 kg)  Wt Readings from Last 3 Encounters:   12/21/22 128 lb (58.1 kg)   12/16/22 114 lb (51.7 kg)   12/12/22 116 lb (52.6 kg)        Vitals :   Vitals:    12/20/22 2306 12/21/22 0328 12/21/22 0918 12/21/22 1137   BP: (!) 131/101 124/67 (!) 110/90 (!) 119/92   Pulse: 99 92 98 85   Resp: 18 18 18 18   Temp: 98.6 °F (37 °C) 98.6 °F (37 °C)  97.9 °F (36.6 °C)   TempSrc: Oral Oral Oral Oral   SpO2: 99% 98% 97% 95%   Weight:  128 lb (58.1 kg)     Height:           Physical examination  General Appearance:  Well developed.  No distress  Mouth/Throat:  Oral mucosa moist  Neck:  Supple, no JVD  Lungs:  Breath sounds: clear  Heart[de-identified]  S1,S2 heard  Abdomen:  Soft, non - tender  Musculoskeletal:  Edema -no edema noted in all 4 extremities well  Some tremor noted    Medications:  Infusion:    sodium chloride 100 mL/hr at 12/21/22 0601    sodium chloride       Meds:    [Held by provider] magnesium oxide  400 mg Oral BID    calcium replacement protocol   Other RX Placeholder    calcitRIOL  0.25 mcg Oral Once per day on Sun Tue Thu Sat    docusate sodium  100 mg Oral Daily    senna  1 tablet Oral Nightly    [Held by provider] potassium bicarb-citric acid  40 mEq Oral Daily    amLODIPine  5 mg Oral Daily    [Held by provider] aspirin  81 mg Oral Daily    buPROPion  200 mg Oral BID    [Held by provider] clopidogrel 75 mg Oral Daily    folic acid  508 mcg Oral Daily    montelukast  10 mg Oral Daily    atorvastatin  10 mg Oral Daily    traZODone  50 mg Oral Nightly    polyethylene glycol  17 g Oral Daily    sodium chloride flush  10 mL IntraVENous 2 times per day    heparin (porcine)  5,000 Units SubCUTAneous 3 times per day       Lab Data :  CBC:   Recent Labs     12/19/22  0633 12/19/22  1652 12/20/22  0503 12/21/22  0556 12/21/22  0950   WBC 6.9  --  7.3 7.0  --    HGB 7.7*   < > 7.4* 6.7* 7.0*   HCT 23.2*   < > 22.2* 21.0* 21.8*   PLT 97*  --  85* 85*  --     < > = values in this interval not displayed.        CMP:  Recent Labs     12/19/22  0633 12/20/22  0503 12/20/22  1504 12/21/22  0556   * 145 142 141   K 3.9  3.9 3.9  3.9 4.1 4.2    98 98 101   CO2 30 28 25 25   BUN 86* 83* 82* 81*   CREATININE 7.1* 6.6* 6.3* 7.2*   GLUCOSE 84 80 91 74   CALCIUM 7.4* 7.0* 7.6* 7.5*   MG 1.5* 2.2  --  2.0   PHOS 6.1* 5.5*  --  5.2*       Hepatic:   Recent Labs     12/19/22  0633   LABALBU 3.0*   AST 23   ALT 14   BILITOT 0.5   ALKPHOS 87         Assessment and Plan:  Renal -acute kidney injury most likely secondary to severe volume depletion,  It looked extremely dry, has been getting IV fluids for the last 5 days without much improvement  Serologic work-up pending renal ultrasound scan is negative  If creatinine is still stable tomorrow we will proceed with renal replacement therapy  Aspirin and Plavix held in anticipation for tunneled dialysis catheter placement if necessary next week  She may even need a renal biopsy  Electrolytes -within normal limits  Acid-base status-metabolic acidosis improved with sodium bicarbonate infusion  Hypocalcemia corrected calcium reasonable  CKD stage IV with baseline creatinine 1.8-2.0  Hx of fibromyalgia  Essential hypertension stable  Meds reviewed and discussed with patient  and nursing staff    George Hussein MD  [facility]    This report has been created using voice recognition software.  It may contain minor errors which are inherent in voice recognition technology

## 2022-12-22 ENCOUNTER — APPOINTMENT (OUTPATIENT)
Dept: INTERVENTIONAL RADIOLOGY/VASCULAR | Age: 67
End: 2022-12-22
Payer: MEDICARE

## 2022-12-22 LAB
ABO: NORMAL
ANA SCREEN: DETECTED
ANION GAP SERPL CALCULATED.3IONS-SCNC: 14 MEQ/L (ref 8–16)
ANTIBODY SCREEN: NORMAL
BASOPHILS # BLD: 0.4 %
BASOPHILS ABSOLUTE: 0 THOU/MM3 (ref 0–0.1)
BUN BLDV-MCNC: 76 MG/DL (ref 7–22)
CALCIUM IONIZED: 0.82 MMOL/L (ref 1.12–1.32)
CALCIUM SERPL-MCNC: 6.5 MG/DL (ref 8.5–10.5)
CHLORIDE BLD-SCNC: 101 MEQ/L (ref 98–111)
CO2: 22 MEQ/L (ref 23–33)
CREAT SERPL-MCNC: 6.9 MG/DL (ref 0.4–1.2)
EKG ATRIAL RATE: 80 BPM
EKG P AXIS: 76 DEGREES
EKG P-R INTERVAL: 144 MS
EKG Q-T INTERVAL: 398 MS
EKG QRS DURATION: 98 MS
EKG QTC CALCULATION (BAZETT): 459 MS
EKG R AXIS: 68 DEGREES
EKG T AXIS: 53 DEGREES
EKG VENTRICULAR RATE: 80 BPM
EOSINOPHIL # BLD: 1.4 %
EOSINOPHILS ABSOLUTE: 0.1 THOU/MM3 (ref 0–0.4)
ERYTHROCYTE [DISTWIDTH] IN BLOOD BY AUTOMATED COUNT: 12.8 % (ref 11.5–14.5)
ERYTHROCYTE [DISTWIDTH] IN BLOOD BY AUTOMATED COUNT: 43.7 FL (ref 35–45)
GFR SERPL CREATININE-BSD FRML MDRD: 6 ML/MIN/1.73M2
GLUCOSE BLD-MCNC: 70 MG/DL (ref 70–108)
HBV SURFACE AB TITR SER: NEGATIVE {TITER}
HCT VFR BLD CALC: 21.1 % (ref 37–47)
HCT VFR BLD CALC: 28.8 % (ref 37–47)
HEMOGLOBIN: 6.8 GM/DL (ref 12–16)
HEMOGLOBIN: 9.5 GM/DL (ref 12–16)
HEPATITIS B CORE IGM ANTIBODY: NEGATIVE
HEPATITIS B SURFACE ANTIGEN: NEGATIVE
IMMATURE GRANS (ABS): 0.06 THOU/MM3 (ref 0–0.07)
IMMATURE GRANULOCYTES: 0.7 %
IMMUNOFIXATION RESULT, SERUM: NORMAL
KAPPA/LAMBDA FREE LIGHT CHAINS: NORMAL
LYMPHOCYTES # BLD: 23.8 %
LYMPHOCYTES ABSOLUTE: 1.9 THOU/MM3 (ref 1–4.8)
MAGNESIUM: 1.9 MG/DL (ref 1.6–2.4)
MCH RBC QN AUTO: 30.5 PG (ref 26–33)
MCHC RBC AUTO-ENTMCNC: 32.2 GM/DL (ref 32.2–35.5)
MCV RBC AUTO: 94.6 FL (ref 81–99)
MONOCYTES # BLD: 8 %
MONOCYTES ABSOLUTE: 0.6 THOU/MM3 (ref 0.4–1.3)
NUCLEATED RED BLOOD CELLS: 0 /100 WBC
PHOSPHORUS: 4.7 MG/DL (ref 2.4–4.7)
PLATELET # BLD: 89 THOU/MM3 (ref 130–400)
PMV BLD AUTO: 12.3 FL (ref 9.4–12.4)
POTASSIUM REFLEX MAGNESIUM: 4 MEQ/L (ref 3.5–5.2)
RBC # BLD: 2.23 MILL/MM3 (ref 4.2–5.4)
RH FACTOR: NORMAL
SEG NEUTROPHILS: 65.7 %
SEGMENTED NEUTROPHILS ABSOLUTE COUNT: 5.3 THOU/MM3 (ref 1.8–7.7)
SODIUM BLD-SCNC: 137 MEQ/L (ref 135–145)
VITAMIN D2 AND D3, TOTAL: NORMAL
WBC # BLD: 8.1 THOU/MM3 (ref 4.8–10.8)

## 2022-12-22 PROCEDURE — 80048 BASIC METABOLIC PNL TOTAL CA: CPT

## 2022-12-22 PROCEDURE — 6360000002 HC RX W HCPCS

## 2022-12-22 PROCEDURE — 86900 BLOOD TYPING SEROLOGIC ABO: CPT

## 2022-12-22 PROCEDURE — 86850 RBC ANTIBODY SCREEN: CPT

## 2022-12-22 PROCEDURE — 2580000003 HC RX 258

## 2022-12-22 PROCEDURE — P9016 RBC LEUKOCYTES REDUCED: HCPCS

## 2022-12-22 PROCEDURE — 85025 COMPLETE CBC W/AUTO DIFF WBC: CPT

## 2022-12-22 PROCEDURE — 02HV33Z INSERTION OF INFUSION DEVICE INTO SUPERIOR VENA CAVA, PERCUTANEOUS APPROACH: ICD-10-PCS | Performed by: RADIOLOGY

## 2022-12-22 PROCEDURE — 97530 THERAPEUTIC ACTIVITIES: CPT

## 2022-12-22 PROCEDURE — 1200000003 HC TELEMETRY R&B

## 2022-12-22 PROCEDURE — 5A1D70Z PERFORMANCE OF URINARY FILTRATION, INTERMITTENT, LESS THAN 6 HOURS PER DAY: ICD-10-PCS | Performed by: INTERNAL MEDICINE

## 2022-12-22 PROCEDURE — 90935 HEMODIALYSIS ONE EVALUATION: CPT

## 2022-12-22 PROCEDURE — 6370000000 HC RX 637 (ALT 250 FOR IP)

## 2022-12-22 PROCEDURE — 93005 ELECTROCARDIOGRAM TRACING: CPT | Performed by: FAMILY MEDICINE

## 2022-12-22 PROCEDURE — 83735 ASSAY OF MAGNESIUM: CPT

## 2022-12-22 PROCEDURE — 87340 HEPATITIS B SURFACE AG IA: CPT

## 2022-12-22 PROCEDURE — 85018 HEMOGLOBIN: CPT

## 2022-12-22 PROCEDURE — 36415 COLL VENOUS BLD VENIPUNCTURE: CPT

## 2022-12-22 PROCEDURE — 97129 THER IVNTJ 1ST 15 MIN: CPT

## 2022-12-22 PROCEDURE — 86705 HEP B CORE ANTIBODY IGM: CPT

## 2022-12-22 PROCEDURE — 2580000003 HC RX 258: Performed by: PHYSICIAN ASSISTANT

## 2022-12-22 PROCEDURE — 86923 COMPATIBILITY TEST ELECTRIC: CPT

## 2022-12-22 PROCEDURE — 2580000003 HC RX 258: Performed by: INTERNAL MEDICINE

## 2022-12-22 PROCEDURE — 36556 INSERT NON-TUNNEL CV CATH: CPT

## 2022-12-22 PROCEDURE — 86706 HEP B SURFACE ANTIBODY: CPT

## 2022-12-22 PROCEDURE — 6360000002 HC RX W HCPCS: Performed by: PHYSICIAN ASSISTANT

## 2022-12-22 PROCEDURE — 86901 BLOOD TYPING SEROLOGIC RH(D): CPT

## 2022-12-22 PROCEDURE — 77001 FLUOROGUIDE FOR VEIN DEVICE: CPT

## 2022-12-22 PROCEDURE — 76937 US GUIDE VASCULAR ACCESS: CPT

## 2022-12-22 PROCEDURE — 6370000000 HC RX 637 (ALT 250 FOR IP): Performed by: PHYSICIAN ASSISTANT

## 2022-12-22 PROCEDURE — 84100 ASSAY OF PHOSPHORUS: CPT

## 2022-12-22 PROCEDURE — C1752 CATH,HEMODIALYSIS,SHORT-TERM: HCPCS

## 2022-12-22 PROCEDURE — 85014 HEMATOCRIT: CPT

## 2022-12-22 PROCEDURE — 82330 ASSAY OF CALCIUM: CPT

## 2022-12-22 RX ORDER — SODIUM CHLORIDE 9 MG/ML
INJECTION, SOLUTION INTRAVENOUS PRN
Status: DISCONTINUED | OUTPATIENT
Start: 2022-12-22 | End: 2022-12-30 | Stop reason: HOSPADM

## 2022-12-22 RX ADMIN — SODIUM CHLORIDE, PRESERVATIVE FREE 10 ML: 5 INJECTION INTRAVENOUS at 21:15

## 2022-12-22 RX ADMIN — SENNOSIDES 8.6 MG: 8.6 TABLET, COATED ORAL at 21:06

## 2022-12-22 RX ADMIN — MONTELUKAST SODIUM 10 MG: 10 TABLET ORAL at 21:07

## 2022-12-22 RX ADMIN — CALCIUM GLUCONATE 4000 MG: 98 INJECTION, SOLUTION INTRAVENOUS at 15:31

## 2022-12-22 RX ADMIN — HYDRALAZINE HYDROCHLORIDE 5 MG: 20 INJECTION INTRAMUSCULAR; INTRAVENOUS at 21:07

## 2022-12-22 RX ADMIN — BUPROPION HYDROCHLORIDE 200 MG: 100 TABLET, FILM COATED, EXTENDED RELEASE ORAL at 21:07

## 2022-12-22 RX ADMIN — PROMETHAZINE HYDROCHLORIDE 6.25 MG: 25 INJECTION INTRAMUSCULAR; INTRAVENOUS at 21:07

## 2022-12-22 RX ADMIN — ONDANSETRON 4 MG: 4 TABLET, ORALLY DISINTEGRATING ORAL at 18:57

## 2022-12-22 RX ADMIN — SODIUM CHLORIDE: 4.5 INJECTION, SOLUTION INTRAVENOUS at 21:13

## 2022-12-22 RX ADMIN — TRAZODONE HYDROCHLORIDE 50 MG: 50 TABLET ORAL at 21:06

## 2022-12-22 NOTE — PROGRESS NOTES
1029 Pt arrived to special procedure room 1 for temporary dialysis catheter insertion.  shown to waiting area. 1030 Procedure explained using teach back method. Pt states understanding. [id number] Dr Inderjit Delgado into assess patient and explain procedure. PTA (12/22/22 1020) This procedure has been fully reviewed with the patient and written informed consent has been obtained. 1040 Patient assisted to table in supine position. Comfort ensured. 1041 Pre-procedure images obtained. 1048 Procedure begins. 1055 Procedure complete. Post-procedure images obtained. 1058 Patient assisted to bed in semi fowlers position. Comfort ensured. 1101 Patient transported to inpatient dialysis in stable condition.  at bedside.

## 2022-12-22 NOTE — PROGRESS NOTES
[facility] [facility]  OCCUPATIONAL THERAPY MISSED TREATMENT NOTE  [facility] RENAL TELEMETRY 6K  6K-06/006-A      Date: 2022  Patient Name: Rafi Espinal        CSN: [id number]   : 1955  (69 y.o.)  Gender: female   Referring Practitioner: CARLOS MANUEL Gandara CNP  Diagnosis: Acute kidney injury superimposed on chronic kidney disease         REASON FOR MISSED TREATMENT: Patient Off Floor for Dialysis; patient was getting a port placed first and then getting dialysis. OT to check back as able and time allows.

## 2022-12-22 NOTE — FLOWSHEET NOTE
12/22/22 1013   Safe Environment   Safety Measures   (VIrtual RN rounds complete)   PT lying in bed with eyes closed, no distress noted at this time

## 2022-12-22 NOTE — H&P
Formulation and discussion of sedation / procedure plans, risks, benefits, side effects and alternatives with patient and/or responsible adult completed.     Electronically signed by Reji Henriquez MD on 12/22/2022 at 10:45 AM

## 2022-12-22 NOTE — CARE COORDINATION
12/22/22, 8:39 AM EST    DISCHARGE ON GOING EVALUATION    [place] day: 6  Location: [facility]06/006-A Reason for admit: Dehydration [E86.0]  [facility] [N17.9]  Acute renal failure superimposed on chronic kidney disease, unspecified CKD stage, unspecified acute renal failure type ([facility] [N17.9, N18.9]  Nausea and vomiting, unspecified vomiting type [R11.2]   Barriers to Discharge: creat 6.9, ical 0.82, Hgb 6.8. IVF, lyte replacement, PRBC transfusion ordered. Dr. Victorina Ho note indicates HD will be needed. Refusing PT/OT. PCP: Alicia Vizcarra MD  Readmission Risk Score: 18.3%  Patient Goals/Plan/Treatment Preferences: Home with . Monitor for OP HD needs. Interested in OP therapies.

## 2022-12-22 NOTE — PROGRESS NOTES
[facility]   Nephrology progress note  12/22/2022, 12:57 PM      Pt Name:    Myron Vazquez  MRN:     [id number]     YOB: 1955  Admit Date:    12/16/2022 10:35 AM    Chief Complaint: Nephrology following for acute kidney injury and metabolic acidosis. Subjective:  Patient seen and examined  No chest pain or shortness of breath  Decent urine output noted    Objective:  24HR INTAKE/OUTPUT:    Intake/Output Summary (Last 24 hours) at 12/22/2022 1257  Last data filed at 12/22/2022 0840  Gross per 24 hour   Intake --   Output 2450 ml   Net -2450 ml        Admission weight: 114 lb (51.7 kg)  Wt Readings from Last 3 Encounters:   12/22/22 121 lb 4.1 oz (55 kg)   12/16/22 114 lb (51.7 kg)   12/12/22 116 lb (52.6 kg)        Vitals :   Vitals:    12/22/22 1014 12/22/22 1209 12/22/22 1222 12/22/22 1241   BP: 115/70 (!) 154/71 (!) 154/71 (!) 165/67   Pulse: 81 81  73   Resp:  18  14   Temp: 98.5 °F (36.9 °C) 97.6 °F (36.4 °C) 97.6 °F (36.4 °C) 98 °F (36.7 °C)   TempSrc: Oral Oral  Oral   SpO2: 95% 95%  95%   Weight:       Height:           Physical examination  General Appearance:  Well developed.  No distress  Mouth/Throat:  Oral mucosa moist  Neck:  Supple, no JVD  Lungs:  Breath sounds: clear  Heart[de-identified]  S1,S2 heard  Abdomen:  Soft, non - tender  Musculoskeletal:  Edema -no edema noted in all 4 extremities well  Some tremor noted    Medications:  Infusion:    sodium chloride      sodium chloride 100 mL/hr at 12/21/22 2241    sodium chloride       Meds:    calcium gluconate  4,000 mg IntraVENous Once    epoetin mumtaz-epbx  2,000 Units SubCUTAneous Once per day on Mon Wed Fri    And    epoetin mumtaz-epbx  3,000 Units SubCUTAneous Once per day on Mon Wed Fri    [Held by provider] magnesium oxide  400 mg Oral BID    calcium replacement protocol   Other RX Placeholder    calcitRIOL  0.25 mcg Oral Once per day on Sun Tue Thu Sat    docusate sodium  100 mg Oral Daily    senna  1 tablet Oral Nightly [Held by provider] potassium bicarb-citric acid  40 mEq Oral Daily    amLODIPine  5 mg Oral Daily    [Held by provider] aspirin  81 mg Oral Daily    buPROPion  200 mg Oral BID    [Held by provider] clopidogrel  75 mg Oral Daily    folic acid  061 mcg Oral Daily    montelukast  10 mg Oral Daily    atorvastatin  10 mg Oral Daily    traZODone  50 mg Oral Nightly    polyethylene glycol  17 g Oral Daily    sodium chloride flush  10 mL IntraVENous 2 times per day    [Held by provider] heparin (porcine)  5,000 Units SubCUTAneous 3 times per day       Lab Data :  CBC:   Recent Labs     12/20/22  0503 12/21/22  0556 12/21/22  0950 12/21/22  2256 12/22/22  0547   WBC 7.3 7.0  --   --  8.1   HGB 7.4* 6.7* 7.0* 7.0* 6.8*   HCT 22.2* 21.0* 21.8* 21.3* 21.1*   PLT 85* 85*  --   --  89*       CMP:  Recent Labs     12/20/22  0503 12/20/22  1504 12/21/22  0556 12/22/22  0547    142 141 137   K 3.9  3.9 4.1 4.2 4.0   CL 98 98 101 101   CO2 28 25 25 22*   BUN 83* 82* 81* 76*   CREATININE 6.6* 6.3* 7.2* 6.9*   GLUCOSE 80 91 74 70   CALCIUM 7.0* 7.6* 7.5* 6.5*   MG 2.2  --  2.0 1.9   PHOS 5.5*  --  5.2* 4.7       Hepatic:   No results for input(s): LABALBU, AST, ALT, ALB, BILITOT, ALKPHOS in the last 72 hours. Assessment and Plan:  Renal -acute kidney injury most likely secondary to severe volume depletion,  It looked extremely dry, has been getting IV fluids for the last 5 days without much improvement  Serologic work-up pending renal ultrasound scan is negative-doubt it will be a significant  Due to no improvement of renal function started her on renal replacement therapy.   We will get her dialyzed again tomorrow  Possibly monitored over the weekend and if renal function does not improve by Monday will go ahead with a tunneled dialysis catheter placement  She may even need a renal biopsy at some point  Electrolytes -within normal limits  Acid-base status-metabolic acidosis improved with sodium bicarbonate infusion  Hypocalcemia corrected calcium reasonable should be corrected with dialysis as well to some degree we will give 1 g of calcium gluconate IV  CKD stage IV with baseline creatinine 1.8-2.0  Hx of fibromyalgia  Essential hypertension stable  Meds reviewed and discussed with patient  and nursing staff    Sea Bernal MD  [facility]    This report has been created using voice recognition software.  It may contain minor errors which are inherent in voice recognition technology

## 2022-12-22 NOTE — PROGRESS NOTES
[address] [address]  INPATIENT PHYSICAL THERAPY  DAILY NOTE  [facility] RENAL TELEMETRY 6K - [id number]  Time In: 0845  Time Out: 0902  Timed Code Treatment Minutes: 17 Minutes  Minutes: 17          Date: 2022  Patient Name: Jose Guadalupe Muller,  Gender:  female        MRN: [id number]  : 1955  (69 y.o.)     Referring Practitioner: CARLOS MANUEL Stephens CNP  Diagnosis: dehydration  Additional Pertinent Hx: Per EMR Abraham Hernandez is a 69 y.o. female who presents to the emergency department for evaluation of, vomiting  Patient presents to the emergency department after being seen in the office because of 4 days of multiple episodes of nausea, emesis, not tolerating oral intake, lower abdominal pain, fatigue, feeling dehydrated; patient was diagnosed with sinusitis being prescribed with doxycycline some days ago, symptoms referred at that time were rhinorrhea, cough, mild shortness of breath; symptoms did not improve and were joint with nausea emesis and abdominal pain. Symptoms low urinary output, dark urine. Was concerned about kidney failure due to history of CKD. \"     Prior Level of Function:  Lives With: Spouse  Type of Home: House  Home Layout: One level  Home Access: Stairs to enter without rails  Entrance Stairs - Number of Steps: 2 MARIANA  Home Equipment: [place] Press, Rollator   Bathroom Shower/Tub: Walk-in shower  Bathroom Toilet: Standard  Bathroom Equipment: Shower chair    ADL Assistance: Independent  Homemaking Assistance: Needs assistance  Ambulation Assistance: Independent  Transfer Assistance: Independent  Active : Yes (short distances)  IADL Comments:  completes most cooking and cleaning tasks  Additional Comments: pt was intermittently using a walker prior to feeling weak and sick, has been using it more in the last 2 weeks.     Restrictions/Precautions:  Restrictions/Precautions: General Precautions, Fall Risk  Position Activity Restriction  Other position/activity restrictions: tremors     SUBJECTIVE: Per chart, pt has hemoglobin of 6.8, planning blood transfusion, however, pt requesting to use the restroom. Nursing reports pt is asymptomatic and ok to get to bathroom and back. PAIN: denies    Vitals: Vitals not assessed per clinical judgement, see nursing flowsheet    OBJECTIVE:  Bed Mobility:  Supine to Sit: Stand By Assistance, X 1  Sit to Supine: Stand By Assistance, X 1   Scooting: Dependent with hercules sheet  Impulsively laid back down prior to being up in bed    Transfers:  Sit to Stand: Contact Guard Assistance, X 1, cues for hand placement, with verbal cues  Stand to Sit:Contact Guard Assistance, X 1, cues for hand placement, with verbal cues  Cues for maintaining walker close to self at all times, fair demo and carryover  Ambulation:  Contact Guard Assistance, X 1, with cues for safety, with verbal cues , with increased time for completion  Distance: 15 feet x2  Surface: Level Tile  Device:4 Wheeled Walker  Gait Deviations: Forward Flexed Posture, Slow Anna, Decreased Step Length Bilaterally, and Decreased Gait Speed, tremors noted throughout, more so than the last few days. Cues for safety and maintaining walker close to self,  fair demo, decreased safety    Balance:  Static Sitting Balance:  Supervision  Dynamic Sitting Balance: Stand By Assistance  Static Standing Balance: Stand By Assistance, Contact Guard Assistance  Dynamic Standing Balance: Contact Guard Assistance  Pt completed toileting, no LOB noted, cues for safety, able to tulio and doff brief. Exercise:  Not completed, provided pt with HEP HO to complete daily. Functional Outcome Measures: Completed  -PAC Inpatient Mobility Raw Score : 20  AM-PAC Inpatient T-Scale Score : 47.67    ASSESSMENT:  Assessment: Patient progressing toward established goals. Activity Tolerance:  Patient tolerance of  treatment: fair.      Equipment Recommendations:Equipment Needed: No  Discharge Recommendations: Continue to assess pending progress, 24 hour assistance or supervision, and Home with Home Health PT  Plan: Current Treatment Recommendations: Strengthening, Balance training, Gait training, Stair training, Functional mobility training, Transfer training, Neuromuscular re-education, Endurance training, Equipment evaluation, education, & procurement, Patient/Caregiver education & training, Safety education & training, Therapeutic activities, Home exercise program  General Plan:  (5x GM)    Patient Education  Patient Education: Plan of Care, Home Exercise Program, Bed Mobility, Equipment Education, Transfers, Gait, Use of Gait [place],  - Patient Verbalized Understanding, - Patient Requires Continued Education    Goals:  Patient Goals : therapy services HH vs outpatient  Short Term Goals  Time Frame for Short Term Goals: by discharge  Short Term Goal 1: Pt will amb for >100 feet with S with RW for support to return home safely. Short Term Goal 2: Pt will demo sit to/from stand transfers with RW for support with IND to return home safely. Short Term Goal 3: Pt will demo SBA for stair negotiation with rail for support as needed to return home safely. Short Term Goal 4: Pt will demo IND with bed mobility tasks with bed flat to return home safely. Long Term Goals  Time Frame for Long Term Goals : NA due to short ELOS    Following session, patient left in safe position with all fall risk precautions in place. Pt in bed following session, all needs and call light in reach, alarm on.

## 2022-12-22 NOTE — FLOWSHEET NOTE
Stable 3 hour TX completed. Removed 500 ml of fluid. Gave 1 unit of PRBCs with dialysis. pt tolerated well, no adverse reactions noted. Bilateral cath ports flushed with normal saline, clamped and secured wtih tegos. CVC drsg clean, dry, and intact. Report given to primary RN. TX record printed for scanning into EMR.   12/22/22 1109 12/22/22 1446   Vital Signs   BP (!) 146/69 (!) 157/66   Temp 97.9 °F (36.6 °C) 97.6 °F (36.4 °C)   Heart Rate 78 76   Resp 18 18   SpO2  --  96 %   Weight 120 lb 13 oz (54.8 kg) 119 lb 11.4 oz (54.3 kg)   Weight Method  --  Bed scale   Percent Weight Change -0.36  --    Post-Hemodialysis Assessment   Post-Treatment Procedures  --  Blood returned;Catheter Capped, clamped with Saline x2 ports   Machine Disinfection Process  --  Acid/Vinegar Clean;Heat Disinfect; Exterior Machine Disinfection   Blood Volume Processed (Liters)  --  42.4 l/min   Dialyzer Clearance  --  Lightly streaked   Duration of Treatment (minutes)  --  180 minutes   Heparin Amount Administered During Treatment (mL)  --  0 mL   Hemodialysis Intake (ml)  --  700 ml   Hemodialysis Output (ml)  --  1200 ml   NET Removed (ml)  --  500   Tolerated Treatment  --  Good

## 2022-12-22 NOTE — PROGRESS NOTES
Hospitalist Progress Note      Patient: [place]      Unit/Bed:6K-06/006-A    YOB: 1955    MRN: [id number]       Acct: [de-identified]     PCP: Narciso Medellin MD    Date of Admission: 12/16/2022    Date/Time of Evaluation:  12/22/2022 at 9:08 AM    Assessment/Plan:    ERUM on CKD stage IV: apprec renal assist- ?etiology but suspect due to severe volume depletion per renal  Creat trending down to 6.3 on 12/20 but up again to 7.2 on 12/21 and 6.9 on 12/22 -->  overall down from 8.2 on 12/16 --> Baseline creatinine range ~ 1.8-2.0 in past year   Per d/w Dr. Tamara Catalan for temp HD catheter and start HD 12/22 and re-eval next week if need to cont and may need renal bx --> cont hold ASA, plavix until next week per his rec for possible procedures (?tunneled tessio, ? renal bx)  IVF adjusted from bicarb gtt (12/16 - 12/19) and changed to NS 12/19 at 100 mL and creat cont worsen 12/21 --> ?HD if no improvement  Renal u/s 12/20/22 with echogenic bilateral kidneys c/w chronic kidney disease  Serologic w/u (p) per renal   Avoid nephrotoxic agents, renally dose medications - s/p bumex 2 mg po x 1 on 12/18  Strict I&O's  ?renal bx  High AGMA, improving:   Likely due to ERUM/dehydration. On bicarb drip 12/16 - 12/19 as improved to WNL 12/19 and changed to NS 12/20 per renal   No signs of infxn -- Lactic acid 0.8 12/16 and 1.1 on 12/20  Nephrology following  Hemoptysis, resolved  Patient noted to have 3 episodes of hemoptysis with coughing. CT chest 12/17/22 with minimal atelectatic/fibrotic stranding left lung base and lingula.   Sputum culture ordered but unable to obtain  Pulmonology consulted, appreciate recs: \"likely combination of recent URI dry humidity and use of plavix and ASA, Pulmonary service will sign off no follow-up needed \"   Hypokalemia   Daily EfferK 40 mEq started 12/17 -> held since 12/20 for normal K - monitor alondra with ERUM  Daily lab  Hypocalcemia  Daily calcium replacement prn  Daily iCal - ?need PTH testing  Hypernatremia  Secondary to #1, dehydration, nephrology following  Hypomagnesemia  Was on Po mag oxide starting 12/17 -> also given IV Mg 12/19 and then po Mg stopped 12/19 due to improved Mg  Cont monitor Mg  Hyperphosphatemia, improving:  Per renal no need for phosphorus binders at this time. D/t ERUM, should resolve as ERUM improves. Daily phosphorus level. Elevated troponin   0.033 on admission x 1 12/16 -> not repeated, EKG no acute ischemic changes. Patient denies chest pain - likely related to ERUM and electrolyte abn   Monitor for cardiac sx - no recent ischemic w/u and last echo 2/2022 with normal EF, no WMA  Abdominal pain, nausea, vomiting:   Patient reports N/V, which may be from limited intake of food. Reports abdominal pain --> ?due to ERUM  Improving since 12/21  LFT 12/19/22 WNL  CT chest 12/17/22 with \"distended gallbladder likely related to not having eaten recently\" --> ??need GI imaging  Stool softners, as needed miralax   As needed antiemetics  URI - sx improved  CXR on admission negative for pulmonary infiltrates or effusions. Patient has been on doxycycline started on 12/12. No evidence of bacterial infection, thus no further antibiotics. Supportive care. Acute on chronic macrocytic anemia  Trending down during admission and Down to 6.7 on 12/21 am -> repeat 7.0  12/21 and down again 6.8 on 12/22 -> transfuse 1 unit PRBC 12/22 as need for HD -- down from 10.3 on 12/16 --> Baseline hemoglobin range ~10-11.    ?some slight loss with hemoptysis and per pulm felt due to epistaxis -> has resolved at least since 12/21  ASA, Plavix held starting 12/21 per renal for possible procedures needed   Suspect likely dilutional component from IVF compounded by poor kidney fxn. FOBT (-) 12/19  Iron studies notable for ferritin 402, iron 145, iron sat 90, TIBC less than 162, folate normal, vitamin B12 greater than 2000  Trend and monitor CBC.   Retacrit started 12/21 per renal  hold heparin for DVT proph 12/22 and resume once hgb stable  Chronic HFpEF, compensated:   Echo (2/15/2022) notable for EF 55 to 60%, no WMA, G1 DD, mild tricuspid regurg. Continue ASA, statin, Plavix, amlodipine. Daily weights, strict I&O's -- fluid mgmt per renal with ERUM and starting HD 12/22  Cardiac diet, 2G sodium  Essential hypertension, controlled:   Continue amlodipine  Monitor and adjust prn  Hyperlipidemia:   Cont home Statin  Depression/anxiety:   Continue Wellbutrin, trazodone  ?on librax at home  History of CVA  No new sx  Continued on aspirin, Plavix, statin -> ASA, plavix held starting 12/21 for possible renal procedures  Mild MR, mild TR -- per echo 2/2022  Generalized weakness  Pt/OT following - monitor progress     Dispo  -- 12/22/2022 -> d/w Dr. Anuel Caldwell - plan for temp HD catheter today and start HD due to renal fxn still not improving but with good u/o last 24 hrs - stated also will decide early next week if needs tunneled HD cath and possible renal bx. Apprec assist.  Transfuse 1 unit PRBC today as down again to 6.8 - no further signs of any bleeding. Cont monitor lytes, renal fxn, u/o, BP, h/h, and cont to increase activity with PT/OT. Chief Complaint: Dehydration     Hospital Course:   Per MAMIE Zelaya note 12/20 \"Per HPI documented 12/16/2022: Roshni Villalpando is a 69 y.o. female with PMHx of CVA, depression, who presented to [facility] with chief complaint of dehydration, N/V. Patient states that she was seen by her PCP on Monday, 12/12 for congestion, sore throat, fatigue, weakness on going for >2 weeks. She was started on PO doxycycline. She continues to feel congested but has no sinus pain or pressure, no cough, afebrile. Approx 2 days after starting doxy, patient developed N/V. Reports very poor PO intake, states she has only been eating ice chips. Reports lower abdominal pain, last BM 3 days ago. Reports decreased urine output, states it is very dark.  Denies dysuria, urgency, frequency. No chest pain, SOB, f/c. Pt follows with Dr. Rafael Agrawal for CKD. Admitted to med/tele for further management. \"     12/17/22: Resumed care of patient today. Patient afebrile, satting 94% on room air, with hemodynamically stable vital signs. Patient stating she feels weak and tired this morning. States that she still feels nauseous and when she ate some breakfast this morning she started dry heaving and almost vomited. States that she has very little appetite. Also reported some intermittent abdominal cramping. States she is passing gas. States her last bowel movement was Tuesday. Stool softeners added. Multiple electrolyte derangements as noted above. Creatinine 7.3 today, down from 8.2. Discussed case with Dr. Kina Toscano nephrology, who recommend decreasing bicarb drip to rate of 100 mL/hr. Patient making urine 900 mL OP overnight. 12/18/22: Afebrile, hemodynamically stable. No acute events overnight. Patient stating she feels a little better this morning but still feels weak. States that she has not had any nausea or vomiting since yesterday. States she had 2 medium solid BMs and no longer has abdominal pain. States she ate all her breakfast.  Creatinine at 7.3 today, no improvement. Ionized calcium still remains low, will continue to replete. Potassium magnesium still low, will continue to replete. Continue IV bicarb drip.     12/19/22: Afebrile, satting 97% on room air, hemodynamically stable. Patient states she is feeling better. Reports no more hemoptysis. States that she still having some nausea and I eating much of her meals. Denies vomiting and abdominal pains. No other complaints. Creatinine 7.1 today. Slow improvement. Continue to correct electrolyte derangements as noted above. Continue IV bicarb drip     12/20/22: Hemodynamically stable. No acute events. Creatinine downtrending, 6.6 today. H&H fluctuating but stable. Phosphorus coming down.   Ionized calcium still low, will replace again today. Potassium and magnesium okay will withhold replacement today. Patient stated that she did have acute episode of nausea followed by vomiting earlier this morning after eating her breakfast.  Currently states that she does not feel nauseous and has no complaints at this time. Continue IV fluids. \"    12/21: n/v improving, creat up to 7.2 again -> IVF adjusted  12/19 per renal as CO2 improving but renal fxn not improving - holding ASA, plavix in prep for possible bx, HD catheter. Subjective/HPI:   -- 12/22/2022  --> pt states she didn't have the n/v episode this am or any abd pain. -2L u/o last 24 hrs. No cp, sob, minimal lightheaded/dizziness when first gets up. No further hemoptysis. Denies cp, sob. Denies f/c.  Abdias po better this am.  On RA. Afebrile. Ambulating better - getting ready to work with therapy. Last BM 12/22 x 1      PMH, SURGICAL HX, FH, SOCIAL HX reviewed and updated as needed.     Medications:  Reviewed    Infusion Medications    sodium chloride      sodium chloride 100 mL/hr at 12/21/22 2241    sodium chloride       Scheduled Medications    calcium gluconate  4,000 mg IntraVENous Once    epoetin mumtaz-epbx  2,000 Units SubCUTAneous Once per day on Mon Wed Fri    And    epoetin mumtaz-epbx  3,000 Units SubCUTAneous Once per day on Mon Wed Fri    [Held by provider] magnesium oxide  400 mg Oral BID    calcium replacement protocol   Other RX Placeholder    calcitRIOL  0.25 mcg Oral Once per day on Sun Tue Thu Sat    docusate sodium  100 mg Oral Daily    senna  1 tablet Oral Nightly    [Held by provider] potassium bicarb-citric acid  40 mEq Oral Daily    amLODIPine  5 mg Oral Daily    [Held by provider] aspirin  81 mg Oral Daily    buPROPion  200 mg Oral BID    [Held by provider] clopidogrel  75 mg Oral Daily    folic acid  603 mcg Oral Daily    montelukast  10 mg Oral Daily    atorvastatin  10 mg Oral Daily    traZODone  50 mg Oral Nightly polyethylene glycol  17 g Oral Daily    sodium chloride flush  10 mL IntraVENous 2 times per day    [Held by provider] heparin (porcine)  5,000 Units SubCUTAneous 3 times per day     PRN Meds: sodium chloride, promethazine, chlordiazePOXIDE-clidinium, fluticasone, simethicone, sodium chloride flush, sodium chloride, ondansetron **OR** ondansetron, acetaminophen **OR** acetaminophen, dextromethorphan-guaiFENesin, cetirizine, hydrALAZINE      Intake/Output Summary (Last 24 hours) at 12/22/2022 0908  Last data filed at 12/22/2022 0840  Gross per 24 hour   Intake 240 ml   Output 2450 ml   Net -2210 ml       Diet:  ADULT ORAL NUTRITION SUPPLEMENT; Breakfast, Dinner; Standard 4 oz Oral Supplement  ADULT DIET; Regular; 4 carb choices (60 gm/meal); Low Fat/Low Chol/High Fiber/2 gm Na; Less than 60 gm    Exam:  BP (!) 138/53   Pulse 75   Temp 98 °F (36.7 °C) (Oral)   Resp 18   Ht 5' (1.524 m)   Wt 121 lb 4.1 oz (55 kg)   SpO2 97%   BMI 23.68 kg/m²     General appearance: No apparent distress, appears older than stated age and cooperative. Oriented x 3. HEENT: Pupils equal, round, and reactive to light. Conjunctivae/corneas clear. MM dry with some cracking on lips - no vesicles. Neck: Supple, with full range of motion. Trachea midline. Respiratory:  Normal respiratory effort. Diminished, few rales in left base, no wheezing, no rhonchi. No respiratory distress or accessory muscle use. Cardiovascular: Regular rate and rhythm with normal S1/S2, 2/6 DAVONTE LUSB,  No JVD. Abdomen: Soft, NO TTP today, non-distended with normal bowel sounds. No rebound or guarding  Musculoskeletal: No clubbing, cyanosis or edema bilaterally. Full range of motion without deformity. No calf tenderness palpation  Skin: Skin color, texture, turgor normal.  No rashes or lesions. Neurologic:  Neurovascularly intact without any focal sensory/motor deficits.  Cranial nerves: II-XII intact, grossly non-focal.  Psychiatric: Alert and oriented, flat affect, thought content appropriate, normal insight  Capillary Refill: Brisk,< 3 seconds   Peripheral Pulses: +2 palpable, equal bilaterally       All labs reviewed and interpreted by me:  Labs:   Recent Labs     12/20/22  0503 12/21/22  0556 12/21/22  0950 12/21/22  2256 12/22/22  0547   WBC 7.3 7.0  --   --  8.1   HGB 7.4* 6.7* 7.0* 7.0* 6.8*   HCT 22.2* 21.0* 21.8* 21.3* 21.1*   PLT 85* 85*  --   --  89*     Recent Labs     12/20/22  0503 12/20/22  1504 12/21/22  0556 12/22/22  0547    142 141 137   K 3.9  3.9 4.1 4.2 4.0   CL 98 98 101 101   CO2 28 25 25 22*   BUN 83* 82* 81* 76*   CREATININE 6.6* 6.3* 7.2* 6.9*   CALCIUM 7.0* 7.6* 7.5* 6.5*   PHOS 5.5*  --  5.2* 4.7     No results for input(s): AST, ALT, BILIDIR, BILITOT, ALKPHOS in the last 72 hours. No results for input(s): INR in the last 72 hours. No results for input(s): Alvaro Lust in the last 72 hours. Urinalysis:      Lab Results   Component Value Date/Time    NITRU NEGATIVE 12/16/2022 05:20 PM    WBCUA 10-15 12/16/2022 05:20 PM    BACTERIA NONE SEEN 12/16/2022 05:20 PM    RBCUA 5-10 12/16/2022 05:20 PM    BLOODU TRACE 12/16/2022 05:20 PM    SPECGRAV 1.010 12/16/2022 05:20 PM    GLUCOSEU Negative 07/16/2021 04:09 PM    GLUCOSEU NEGATIVE 06/09/2020 03:03 PM       All radiology images and reports reviewed and interpreted by me:  Radiology:  US RENAL COMPLETE   Final Result   Impression:   Echogenic bilateral kidneys, this represents chronic kidney disease. This document has been electronically signed by: Cassandra Posada MD on    12/20/2022 08:27 PM      CT CHEST WO CONTRAST   Final Result   Minimal atelectatic/fibrotic stranding left lung base and lingula. Small hiatus hernia. Distended gallbladder, likely related to not having eaten recently. **This report has been created using voice recognition software. It may contain minor errors which are inherent in voice recognition technology. **      Final report electronically signed by Dr. Ignacio Bragg on 12/17/2022 5:56 PM      XR CHEST (2 VW)   Final Result   1. No interval change since previous study dated 9/18/2021. Denise Arriaza **This report has been created using voice recognition software. It may contain minor errors which are inherent in voice recognition technology. **      Final report electronically signed by DR Valentine Soto on 12/16/2022 11:36 AM          Diet: ADULT ORAL NUTRITION SUPPLEMENT; Breakfast, Dinner; Standard 4 oz Oral Supplement  ADULT DIET; Regular; 4 carb choices (60 gm/meal); Low Fat/Low Chol/High Fiber/2 gm Na; Less than 60 gm    Smith: no    Microbiology:  throat cx 12/16 (-)    Covid 12/16 (-), influenza 12/16 (-)    Urine cx 12/17 = contaminants    Tele review last 24 hrs:  SR, no arrhythmias    DVT prophylaxis: [] Lovenox                                 [] SCDs                                 [x] SQ Heparin                                 [] Encourage ambulation           [] Already on Anticoagulation     Disposition:    [x] Home with ? [facility]       [] TCU       [] Rehab       [] Psych       [] SNF       [] [place]       [] Other-    Code Status: Full Code    PT/OT Eval Status: following      Electronically signed by José Miguel Watkins MD on 12/22/2022 at 9:08 AM

## 2022-12-22 NOTE — OP NOTE
Department of Radiology  Post Procedure Progress Note      Pre-Procedure Diagnosis:  Renal Failure    Procedure Performed: Dialysis Catheter insertion     Anesthesia: local     Findings: successful    Immediate Complications:  None    Estimated Blood Loss: minimal    SEE DICTATED PROCEDURE NOTE FOR COMPLETE DETAILS.       Electronically signed by Zuri Aguilar MD on 12/22/2022 at 10:57 AM

## 2022-12-22 NOTE — PROGRESS NOTES
Speech Language Pathology  [facility]  INPATIENT SPEECH THERAPY  [facility] RENAL TELEMETRY 6K  DAILY NOTE    TIME   SLP Individual Minutes  Time In: 8442  Time Out: 1004  Minutes: 13  Timed Code Treatment Minutes: 13 Minutes       Date: 2022  Patient Name: Ana Ford      CSN: [id number]   : 1955  (69 y.o.)  Gender: female   Referring Physician:  BRITANY Zavala  Diagnosis: ERUM(acute kidney injury)([facility])  Precautions: fall risk  Current Diet: Regular, thin  Swallowing Strategies: Standard Universal Swallow Precautions  Date of Last MBS/FEES: Not Applicable    Pain:  No pain reported. Subjective:  RN Benjamin Roles approved ST visit this date. Patient was resting in bed upon ST entry awaiting transportation to dialysis. Her  was present at bedside this date. Patient initially participated in therapy tasks but then was noted to fall asleep. Session discontinued due to patient lethargy. Short-Term Goals:  SHORT TERM GOAL #1:  Goal 1: Patient will complete verbal/visual reasoning, problem solving, and safety tasks with 80% accuracy given mod cues to improve safety and awareness within environment. INTERVENTIONS:  Time -Verbal Problem Solving  100% independent. SHORT TERM GOAL #2:  Goal 2: Patient will complete recall tasks (immediate, delayed, working) with focus on memory strategies with 75% accuracy given mod cues to improve recall of pertinent information. INTERVENTIONS:  5 sentence paragraph recall - Y/N Questions  40% independent. Patient was thought to be thinking by closing her eyes but it became apparent that she had fallen asleep. Session discontinued. SHORT TERM GOAL #3:  Goal 3: Patient will complete thought organization tasks (divergent/convergent/responsive naming, sequencing, calendar use/planning) with 80% accuracy given mod cues to improve processing speed and organization during ADL completion.   INTERVENTIONS:Not addressed this date due to patient lethargy. SHORT TERM GOAL #4:  Goal 4: Patient will complete attention tasks (sustained, selective, alternating, divided) with no more than 8 errors/redirections within task to improve attention during ADL completion and multi-tasking. INTERVENTIONS:Not addressed this date due to patient lethargy. Long-Term Goals:   No long term goals established due to short ELOS. EDUCATION:  Learner: Patient and Significant Other  Education:  Reviewed ST goals and Plan of Care  Evaluation of Education: Criss Ellis understanding and Needs further instruction    ASSESSMENT/PLAN:  Activity Tolerance:  Patient tolerance of  treatment: poor. lethargy     Assessment/Plan: Patient progressing toward established goals. Continues to require skilled care of licensed speech pathologist to progress toward achievement of established goals and plan of care. .     Plan for Next Session: functional cognitive tasks  Discharge Recommendations: Continue to Assess Pending Progress     Ashli Mahajan M.A.  CCC-SLP

## 2022-12-22 NOTE — CONSENT
Informed Consent for Blood Component Transfusion Note    I have discussed with the patient the rationale for blood component transfusion; its benefits in treating or preventing fatigue, organ damage, or death; and its risk which includes mild transfusion reactions, rare risk of blood borne infection, or more serious but rare reactions. I have discussed the alternatives to transfusion, including the risk and consequences of not receiving transfusion. The patient had an opportunity to ask questions and had agreed to proceed with transfusion of blood components.     Electronically signed by Rowena Zaldivar MD on 12/21/22 at 11:12 AM EST

## 2022-12-23 PROBLEM — D69.6 THROMBOCYTOPENIA (HCC): Status: ACTIVE | Noted: 2022-12-23

## 2022-12-23 PROBLEM — N17.0 ATN (ACUTE TUBULAR NECROSIS) (HCC): Status: ACTIVE | Noted: 2022-12-23

## 2022-12-23 LAB
ANCA IFA PATTERN: NORMAL
ANCA IFA TITER: NORMAL
ANION GAP SERPL CALCULATED.3IONS-SCNC: 11 MEQ/L (ref 8–16)
BASOPHILS # BLD: 0.4 %
BASOPHILS ABSOLUTE: 0 THOU/MM3 (ref 0–0.1)
BUN BLDV-MCNC: 28 MG/DL (ref 7–22)
CALCIUM SERPL-MCNC: 9 MG/DL (ref 8.5–10.5)
CHLORIDE BLD-SCNC: 105 MEQ/L (ref 98–111)
CO2: 24 MEQ/L (ref 23–33)
CREAT SERPL-MCNC: 3.9 MG/DL (ref 0.4–1.2)
EOSINOPHIL # BLD: 0.3 %
EOSINOPHILS ABSOLUTE: 0 THOU/MM3 (ref 0–0.4)
ERYTHROCYTE [DISTWIDTH] IN BLOOD BY AUTOMATED COUNT: 18.4 % (ref 11.5–14.5)
ERYTHROCYTE [DISTWIDTH] IN BLOOD BY AUTOMATED COUNT: 59.6 FL (ref 35–45)
GFR SERPL CREATININE-BSD FRML MDRD: 12 ML/MIN/1.73M2
GLOMERULAR BASEMENT MEMBRANE ANTIBODY: NORMAL
GLUCOSE BLD-MCNC: 82 MG/DL (ref 70–108)
HCT VFR BLD CALC: 28.3 % (ref 37–47)
HEMOGLOBIN: 9 GM/DL (ref 12–16)
IMMATURE GRANS (ABS): 0.1 THOU/MM3 (ref 0–0.07)
IMMATURE GRANULOCYTES: 1.1 %
LYMPHOCYTES # BLD: 22.1 %
LYMPHOCYTES ABSOLUTE: 2 THOU/MM3 (ref 1–4.8)
MCH RBC QN AUTO: 28.3 PG (ref 26–33)
MCHC RBC AUTO-ENTMCNC: 31.8 GM/DL (ref 32.2–35.5)
MCV RBC AUTO: 89 FL (ref 81–99)
MONOCYTES # BLD: 9.5 %
MONOCYTES ABSOLUTE: 0.9 THOU/MM3 (ref 0.4–1.3)
MYELOPEROXIDASE AB, IGG: 0 AU/ML (ref 0–19)
NUCLEATED RED BLOOD CELLS: 0 /100 WBC
PLATELET # BLD: 100 THOU/MM3 (ref 130–400)
PMV BLD AUTO: 11.9 FL (ref 9.4–12.4)
POTASSIUM REFLEX MAGNESIUM: 4.4 MEQ/L (ref 3.5–5.2)
RBC # BLD: 3.18 MILL/MM3 (ref 4.2–5.4)
SEG NEUTROPHILS: 66.6 %
SEGMENTED NEUTROPHILS ABSOLUTE COUNT: 6 THOU/MM3 (ref 1.8–7.7)
SERINE PROTEASE 3, IGG: 0 AU/ML (ref 0–19)
SODIUM BLD-SCNC: 140 MEQ/L (ref 135–145)
WBC # BLD: 9 THOU/MM3 (ref 4.8–10.8)

## 2022-12-23 PROCEDURE — 97535 SELF CARE MNGMENT TRAINING: CPT

## 2022-12-23 PROCEDURE — 2580000003 HC RX 258: Performed by: PHYSICIAN ASSISTANT

## 2022-12-23 PROCEDURE — 97166 OT EVAL MOD COMPLEX 45 MIN: CPT

## 2022-12-23 PROCEDURE — 6370000000 HC RX 637 (ALT 250 FOR IP): Performed by: PHYSICIAN ASSISTANT

## 2022-12-23 PROCEDURE — 97110 THERAPEUTIC EXERCISES: CPT

## 2022-12-23 PROCEDURE — 6360000002 HC RX W HCPCS: Performed by: INTERNAL MEDICINE

## 2022-12-23 PROCEDURE — 80048 BASIC METABOLIC PNL TOTAL CA: CPT

## 2022-12-23 PROCEDURE — 85025 COMPLETE CBC W/AUTO DIFF WBC: CPT

## 2022-12-23 PROCEDURE — 36415 COLL VENOUS BLD VENIPUNCTURE: CPT

## 2022-12-23 PROCEDURE — 6370000000 HC RX 637 (ALT 250 FOR IP)

## 2022-12-23 PROCEDURE — 90935 HEMODIALYSIS ONE EVALUATION: CPT

## 2022-12-23 PROCEDURE — 1200000003 HC TELEMETRY R&B

## 2022-12-23 PROCEDURE — 97116 GAIT TRAINING THERAPY: CPT

## 2022-12-23 PROCEDURE — 2580000003 HC RX 258: Performed by: INTERNAL MEDICINE

## 2022-12-23 RX ADMIN — MONTELUKAST SODIUM 10 MG: 10 TABLET ORAL at 20:10

## 2022-12-23 RX ADMIN — BUPROPION HYDROCHLORIDE 200 MG: 100 TABLET, FILM COATED, EXTENDED RELEASE ORAL at 20:10

## 2022-12-23 RX ADMIN — EPOETIN ALFA-EPBX 2000 UNITS: 2000 INJECTION, SOLUTION INTRAVENOUS; SUBCUTANEOUS at 08:45

## 2022-12-23 RX ADMIN — POLYETHYLENE GLYCOL 3350 17 G: 17 POWDER, FOR SOLUTION ORAL at 08:45

## 2022-12-23 RX ADMIN — SODIUM CHLORIDE, PRESERVATIVE FREE 10 ML: 5 INJECTION INTRAVENOUS at 20:11

## 2022-12-23 RX ADMIN — FOLIC ACID 500 MCG: 1 TABLET ORAL at 08:45

## 2022-12-23 RX ADMIN — SENNOSIDES 8.6 MG: 8.6 TABLET, COATED ORAL at 20:11

## 2022-12-23 RX ADMIN — EPOETIN ALFA-EPBX 3000 UNITS: 3000 INJECTION, SOLUTION INTRAVENOUS; SUBCUTANEOUS at 08:46

## 2022-12-23 RX ADMIN — ATORVASTATIN CALCIUM 10 MG: 10 TABLET, FILM COATED ORAL at 08:45

## 2022-12-23 RX ADMIN — DOCUSATE SODIUM 100 MG: 100 CAPSULE, LIQUID FILLED ORAL at 08:45

## 2022-12-23 RX ADMIN — SODIUM CHLORIDE: 4.5 INJECTION, SOLUTION INTRAVENOUS at 06:09

## 2022-12-23 RX ADMIN — BUPROPION HYDROCHLORIDE 200 MG: 100 TABLET, FILM COATED, EXTENDED RELEASE ORAL at 08:45

## 2022-12-23 RX ADMIN — AMLODIPINE BESYLATE 5 MG: 5 TABLET ORAL at 08:45

## 2022-12-23 RX ADMIN — TRAZODONE HYDROCHLORIDE 50 MG: 50 TABLET ORAL at 20:11

## 2022-12-23 ASSESSMENT — PAIN SCALES - GENERAL: PAINLEVEL_OUTOF10: 0

## 2022-12-23 NOTE — PLAN OF CARE
Problem: Discharge Planning  Goal: Discharge to home or other facility with appropriate resources  12/23/2022 1118 by Bambi Chacon RN  Outcome: Progressing  Flowsheets (Taken 12/23/2022 8505)  Discharge to home or other facility with appropriate resources: Identify barriers to discharge with patient and caregiver  12/23/2022 0236 by Cordell Hsieh RN  Outcome: Vicente Ray (Taken 12/20/2022 2303 by Moira Swain, RN)  Discharge to home or other facility with appropriate resources:   Identify barriers to discharge with patient and caregiver   Arrange for needed discharge resources and transportation as appropriate   Identify discharge learning needs (meds, wound care, etc)   Arrange for interpreters to assist at discharge as needed   Refer to discharge planning if patient needs post-hospital services based on physician order or complex needs related to functional status, cognitive ability or social support system  12/23/2022 0236 by Cordell Hsieh RN  Outcome: Progressing     Problem: Pain  Goal: Verbalizes/displays adequate comfort level or baseline comfort level  12/23/2022 1118 by Bambi Chacon RN  Outcome: Progressing  [address] (Taken 12/20/2022 2303 by Moira Swain, RN)  Verbalizes/displays adequate comfort level or baseline comfort level:   Encourage patient to monitor pain and request assistance   Assess pain using appropriate pain scale   Administer analgesics based on type and severity of pain and evaluate response   Implement non-pharmacological measures as appropriate and evaluate response   Consider cultural and social influences on pain and pain management   Notify Licensed Independent Practitioner if interventions unsuccessful or patient reports new pain  12/23/2022 0236 by Cordell Hsieh RN  Outcome: Progressing  [address] (Taken 12/20/2022 2303 by Moira Swain, RN)  Verbalizes/displays adequate comfort level or baseline comfort level:   Encourage patient to monitor pain and request assistance   Assess pain using appropriate pain scale   Administer analgesics based on type and severity of pain and evaluate response   Implement non-pharmacological measures as appropriate and evaluate response   Consider cultural and social influences on pain and pain management   Notify Licensed Independent Practitioner if interventions unsuccessful or patient reports new pain  12/23/2022 0236 by Salima Huerta RN  Outcome: Progressing     Problem: Skin/Tissue Integrity  Goal: Absence of new skin breakdown  Description: 1. Monitor for areas of redness and/or skin breakdown  2. Assess vascular access sites hourly  3. Every 4-6 hours minimum:  Change oxygen saturation probe site  4. Every 4-6 hours:  If on nasal continuous positive airway pressure, respiratory therapy assess nares and determine need for appliance change or resting period.   12/23/2022 1118 by Raynard Lefort, RN  Outcome: Progressing  12/23/2022 0236 by Salima Huerta RN  Outcome: Progressing  12/23/2022 0236 by Salima Huerta RN  Outcome: Progressing     Problem: Safety - Adult  Goal: Free from fall injury  12/23/2022 1118 by Raynard Lefort, RN  Outcome: Progressing  12/23/2022 0236 by Salima Huerta RN  Outcome: Progressing  Flowsheets (Taken 12/20/2022 2303 by Waleska Velázquez RN)  Free From Fall Injury:   Instruct family/caregiver on patient safety   Based on caregiver fall risk screen, instruct family/caregiver to ask for assistance with transferring infant if caregiver noted to have fall risk factors  12/23/2022 0236 by Salima Heurta RN  Outcome: Progressing     Problem: ABCDS Injury Assessment  Goal: Absence of physical injury  12/23/2022 1118 by Raynard Lefort, RN  Outcome: Progressing  Flowsheets (Taken 12/23/2022 0236 by Salima Huerta RN)  Absence of Physical Injury: Implement safety measures based on patient assessment  12/23/2022 0236 by Salima Huerta RN  Outcome: Progressing  Flowsheets (Taken 12/23/2022 0236)  Absence of Physical Injury: Implement safety measures based on patient assessment  12/23/2022 0236 by Any Good RN  Outcome: Progressing  Flowsheets (Taken 12/23/2022 0236)  Absence of Physical Injury: Implement safety measures based on patient assessment     Problem: Genitourinary - Adult  Goal: Absence of urinary retention  12/23/2022 1118 by Nina Hoyt RN  Outcome: Nohelia Schilling (Taken 12/20/2022 2303 by Nessa Franco RN)  Absence of urinary retention:   Assess patients ability to void and empty bladder   Monitor intake/output and perform bladder scan as needed   Place urinary catheter per Licensed Independent Practitioner order if needed   Discuss with Licensed Independent Practitioner  medications to alleviate retention as needed   Discuss catheterization for long term situations as appropriate  12/23/2022 0236 by Any Good RN  Outcome: Progressing  [address] (Taken 12/20/2022 2303 by Nessa Franoc RN)  Absence of urinary retention:   Assess patients ability to void and empty bladder   Monitor intake/output and perform bladder scan as needed   Place urinary catheter per Licensed Independent Practitioner order if needed   Discuss with Licensed Independent Practitioner  medications to alleviate retention as needed   Discuss catheterization for long term situations as appropriate  12/23/2022 0236 by Any Good RN  Outcome: Progressing     Problem: Metabolic/Fluid and Electrolytes - Adult  Goal: Electrolytes maintained within normal limits  12/23/2022 1118 by Nina Hoyt RN  Outcome: Progressing  Flowsheets (Taken 12/23/2022 8309)  Electrolytes maintained within normal limits: Monitor labs and assess patient for signs and symptoms of electrolyte imbalances  12/23/2022 0236 by Any Good RN  Outcome: Progressing  Flowsheets (Taken 12/20/2022 2303 by Nessa Franco RN)  Electrolytes maintained within normal limits:   Monitor labs and assess patient for signs and symptoms of electrolyte imbalances   Administer electrolyte replacement as ordered   Monitor response to electrolyte replacements, including repeat lab results as appropriate   Fluid restriction as ordered   Instruct patient on fluid and nutrition restrictions as appropriate  12/23/2022 0236 by Nallely Robison RN  Outcome: Progressing  Goal: Hemodynamic stability and optimal renal function maintained  12/23/2022 1118 by Sarahy Pisano RN  Outcome: Progressing  Flowsheets (Taken 12/23/2022 9619)  Hemodynamic stability and optimal renal function maintained: Monitor labs and assess for signs and symptoms of volume excess or deficit  12/23/2022 0236 by Nallely Robison RN  Outcome: Progressing  [address] (Taken 12/20/2022 2303 by Lissette Campos RN)  Hemodynamic stability and optimal renal function maintained:   Monitor labs and assess for signs and symptoms of volume excess or deficit   Monitor intake, output and patient weight   Monitor urine specific gravity, serum osmolarity and serum sodium as indicated or ordered   Monitor response to interventions for patient's volume status, including labs, urine output, blood pressure (other measures as available)   Encourage oral intake as appropriate   Instruct patient on fluid and nutrition restrictions as appropriate  12/23/2022 0236 by Nallely Robison RN  Outcome: Progressing     Problem: Chronic Conditions and Co-morbidities  Goal: Patient's chronic conditions and co-morbidity symptoms are monitored and maintained or improved  12/23/2022 1118 by Sarahy Pisano RN  Outcome: Progressing  [address] (Taken 12/23/2022 0836)  Care Plan - Patient's Chronic Conditions and Co-Morbidity Symptoms are Monitored and Maintained or Improved: Monitor and assess patient's chronic conditions and comorbid symptoms for stability, deterioration, or improvement  12/23/2022 0236 by Nallely Robison RN  Outcome: Progressing  Flowsheets (Taken 12/20/2022 2303 by Lissette Campos RN)  Care Plan - Patient's Chronic Conditions and Co-Morbidity Symptoms are Monitored and Maintained or Improved:   Monitor and assess patient's chronic conditions and comorbid symptoms for stability, deterioration, or improvement   Collaborate with multidisciplinary team to address chronic and comorbid conditions and prevent exacerbation or deterioration   Update acute care plan with appropriate goals if chronic or comorbid symptoms are exacerbated and prevent overall improvement and discharge  12/23/2022 0236 by Mara Drew RN  Outcome: Progressing     Problem: Nutrition Deficit:  Goal: Optimize nutritional status  12/23/2022 1118 by Arnoldo Solis RN  Outcome: Progressing  12/23/2022 0236 by Mara Drew RN  Outcome: Progressing  Flowsheets (Taken 12/20/2022 2303 by Oscar Gray RN)  Nutrient intake appropriate for improving, restoring, or maintaining nutritional needs:   Assess nutritional status and recommend course of action   Monitor oral intake, labs, and treatment plans   Recommend appropriate diets, oral nutritional supplements, and vitamin/mineral supplements   Order, calculate, and assess calorie counts as needed   Recommend, monitor, and adjust tube feedings and TPN/PPN based on assessed needs   Provide specific nutrition education to patient or family as appropriate  12/23/2022 0236 by Mara Drew RN  Outcome: Progressing

## 2022-12-23 NOTE — PROGRESS NOTES
Hospitalist Progress Note      Patient: Yary Vasquez      Unit/Bed:6K-06/006-A    YOB: 1955    MRN: [id number]       Acct: [de-identified]     PCP: Kellee Buchanan MD    Date of Admission: 12/16/2022    Date/Time of Evaluation:  12/23/2022 at 10:19 AM    Assessment/Plan:    ERUM on CKD stage IV: apprec renal assist- ?etiology but suspect due to severe volume depletion per renal  Creat trending down to 6.3 on 12/20 but up again to 7.2 on 12/21 and 6.9 on 12/22 -->  overall down from 8.2 on 12/16 --> Baseline creatinine range ~ 1.8-2.0 in past year   Since not improving per renal 12/22 temp HD catheter and started HD 12/22 -> creat down to 3.9 on 12/23/2022   Per Dr. Elina Cisneros re-eval next week if need to cont and may need renal bx --> cont hold ASA, plavix until next week per his rec for possible procedures (?tunneled tessio, ? renal bx)  IVF adjusted from bicarb gtt (12/16 - 12/19) and changed to NS 12/19 at 100 mL and creat cont worsen 12/21 --> ?HD if no improvement  Renal u/s 12/20/22 with echogenic bilateral kidneys c/w chronic kidney disease  Serologic w/u (p) per renal   Avoid nephrotoxic agents, renally dose medications - s/p bumex 2 mg po x 1 on 12/18  Strict I&O's  ?renal bx  High AGMA, improving:   Likely due to ERUM/dehydration. On bicarb drip 12/16 - 12/19 as improved to WNL 12/19 and changed to NS 12/20 per renal   No signs of infxn -- Lactic acid 0.8 12/16 and 1.1 on 12/20  Nephrology following  Hemoptysis, resolved  Patient noted to have 3 episodes of hemoptysis with coughing. CT chest 12/17/22 with minimal atelectatic/fibrotic stranding left lung base and lingula.   Sputum culture ordered but unable to obtain  Pulmonology consulted, appreciate recs: \"likely combination of recent URI dry humidity and use of plavix and ASA, Pulmonary service will sign off no follow-up needed \"   Hypokalemia   Daily EfferK 40 mEq started 12/17 -> held since 12/20 for normal K - monitor alondra with ERUM  Daily lab  Hypocalcemia  Daily calcium replacement prn  Daily iCal - ?need PTH, vit D testing - will leave up to renal  Hypernatremia  Improved -- Secondary to #1, dehydration, nephrology following  Hypomagnesemia  Was on Po mag oxide starting 12/17 -> also given IV Mg 12/19 and then po Mg stopped 12/19 due to improved Mg  Cont monitor Mg  Hyperphosphatemia  Per renal no need for phosphorus binders at this time. D/t ERUM, should resolve as ERUM improves. Daily phosphorus level. Elevated troponin   0.033 on admission x 1 12/16 -> not repeated, EKG no acute ischemic changes. Patient denies chest pain - likely related to ERUM and electrolyte abn   Monitor for cardiac sx - no recent ischemic w/u and last echo 2/2022 with normal EF, no WMA  Abdominal pain, nausea, vomiting:   Patient reports N/V on admission - ?due to not being able to eat but more likely due to ERUM? ? Improving since 12/21  LFT 12/19/22 WNL  CT chest 12/17/22 with \"distended gallbladder likely related to not having eaten recently\" --> ??need GI imaging  Stool softners, as needed miralax   As needed antiemetics zofran, phenergan IM prn   URI - sx improved  CXR on admission negative for pulmonary infiltrates or effusions. Patient has been on doxycycline started on 12/12. No evidence of bacterial infection, thus no further antibiotics. Supportive care. Thrombocytopenia   Plts tended down from admission -- 131 on admission 12/17 -> down to 85 low on 12/21 and starting to trend up to 100 on 12/23/2022   ?etiology - ?plavix but Plts normal on admission and down - no signs of sepsis, ? DIC with renal failure but improving 12/23 - no other meds to contrib  Acute on chronic macrocytic anemia  Trending down during admission and Down to 6.7 on 12/21 am -> repeat 7.0  12/21 and down again 6.8 on 12/22 -> transfused 1 unit PRBC 12/22 as need for HD and hgb up to 9.0 on 12/23/2022    down from 10.3 on 12/16 --> Baseline hemoglobin range ~10-11.    ?some slight loss with hemoptysis and per pulm felt due to epistaxis -> has resolved at least since 12/21  ASA, Plavix held starting 12/21 per renal for possible procedures needed   Suspect likely dilutional component from IVF compounded by poor kidney fxn. FOBT (-) 12/19  Iron studies notable for ferritin 402, iron 145, iron sat 90, TIBC less than 162, folate normal, vitamin B12 greater than 2000  Trend and monitor CBC. Retacrit started 12/21 per renal  hold heparin for DVT proph 12/22 and resume once hgb stable  Chronic HFpEF, compensated:   Echo (2/15/2022) notable for EF 55 to 60%, no WMA, G1 DD, mild tricuspid regurg. Continue ASA, statin, Plavix, amlodipine. Daily weights, strict I&O's -- fluid mgmt per renal with ERUM and starting HD 12/22  Cardiac diet, 2G sodium  Essential hypertension, controlled:   Continue amlodipine 5 mg daily -> ?increase to 10 mg  Cont prn hydralazine  Monitor and adjust prn  Hyperlipidemia:   Cont home Statin  Depression/anxiety:   Continue Wellbutrin, trazodone  ?on librax at home  History of CVA  No new sx  Continued on aspirin, Plavix, statin -> ASA, plavix held starting 12/21 for possible renal procedures  Mild MR, mild TR -- per echo 2/2022  Generalized weakness  Pt/OT following - monitor progress     Dispo  -- 12/23/2022 -> apprec renal assist -- lucille HD ok yesterday and plan again today per renal with creat improving ;  plan to cont monitor over weekend and determine next week if need tunneled HD catheter and ?renal bx -- Cont monitor lytes, renal fxn, u/o, BP, h/h, plts, and cont to increase activity with PT/OT. Chief Complaint: Dehydration     Hospital Course:   Per CNP Susanne Winston note 12/20 \"Per HPI documented 12/16/2022: Velvet Meyers is a 69 y.o. female with PMHx of CVA, depression, who presented to [facility] with chief complaint of dehydration, N/V.  Patient states that she was seen by her PCP on Monday, 12/12 for congestion, sore throat, fatigue, weakness on going for >2 weeks. She was started on PO doxycycline. She continues to feel congested but has no sinus pain or pressure, no cough, afebrile. Approx 2 days after starting doxy, patient developed N/V. Reports very poor PO intake, states she has only been eating ice chips. Reports lower abdominal pain, last BM 3 days ago. Reports decreased urine output, states it is very dark. Denies dysuria, urgency, frequency. No chest pain, SOB, f/c. Pt follows with Dr. Amanda Villalba for CKD. Admitted to med/tele for further management. \"     12/17/22: Resumed care of patient today. Patient afebrile, satting 94% on room air, with hemodynamically stable vital signs. Patient stating she feels weak and tired this morning. States that she still feels nauseous and when she ate some breakfast this morning she started dry heaving and almost vomited. States that she has very little appetite. Also reported some intermittent abdominal cramping. States she is passing gas. States her last bowel movement was Tuesday. Stool softeners added. Multiple electrolyte derangements as noted above. Creatinine 7.3 today, down from 8.2. Discussed case with Dr. Yojana Brooks nephrology, who recommend decreasing bicarb drip to rate of 100 mL/hr. Patient making urine 900 mL OP overnight. 12/18/22: Afebrile, hemodynamically stable. No acute events overnight. Patient stating she feels a little better this morning but still feels weak. States that she has not had any nausea or vomiting since yesterday. States she had 2 medium solid BMs and no longer has abdominal pain. States she ate all her breakfast.  Creatinine at 7.3 today, no improvement. Ionized calcium still remains low, will continue to replete. Potassium magnesium still low, will continue to replete. Continue IV bicarb drip.     12/19/22: Afebrile, satting 97% on room air, hemodynamically stable. Patient states she is feeling better. Reports no more hemoptysis.   States that she still having some nausea and I eating much of her meals. Denies vomiting and abdominal pains. No other complaints. Creatinine 7.1 today. Slow improvement. Continue to correct electrolyte derangements as noted above. Continue IV bicarb drip     12/20/22: Hemodynamically stable. No acute events. Creatinine downtrending, 6.6 today. H&H fluctuating but stable. Phosphorus coming down. Ionized calcium still low, will replace again today. Potassium and magnesium okay will withhold replacement today. Patient stated that she did have acute episode of nausea followed by vomiting earlier this morning after eating her breakfast.  Currently states that she does not feel nauseous and has no complaints at this time. Continue IV fluids. \"    12/21: n/v improving, creat up to 7.2 again -> IVF adjusted  12/19 per renal as CO2 improving but renal fxn not improving - holding ASA, plavix in prep for possible bx, HD catheter. 12/22 -> creat still 6.9 -> per renal temp HD catheter placed and started on HD    Subjective/HPI:   -- 12/23/2022  --> pt states HD initially little rough yesterday but then ok. Denies any cp, sob. States abd pain and nausea improved - but did take phenergan last pm.  No lightheaded/dizziness. 950 mL u/o recorded last 24 hrs and 1.2L removed with HD. Afeb, on RA. Last BM 12/22 x 1      PMH, SURGICAL HX, FH, SOCIAL HX reviewed and updated as needed.     Medications:  Reviewed    Infusion Medications    sodium chloride      sodium chloride 100 mL/hr at 12/23/22 5580    sodium chloride       Scheduled Medications    epoetin mumtaz-epbx  2,000 Units SubCUTAneous Once per day on Mon Wed Fri    And    epoetin mumtaz-epbx  3,000 Units SubCUTAneous Once per day on Mon Wed Fri    [Held by provider] magnesium oxide  400 mg Oral BID    calcium replacement protocol   Other RX Placeholder    calcitRIOL  0.25 mcg Oral Once per day on Sun Tue Thu Sat    docusate sodium  100 mg Oral Daily    senna  1 tablet Oral Nightly    [Held by provider] potassium bicarb-citric acid  40 mEq Oral Daily    amLODIPine  5 mg Oral Daily    [Held by provider] aspirin  81 mg Oral Daily    buPROPion  200 mg Oral BID    [Held by provider] clopidogrel  75 mg Oral Daily    folic acid  276 mcg Oral Daily    montelukast  10 mg Oral Daily    atorvastatin  10 mg Oral Daily    traZODone  50 mg Oral Nightly    polyethylene glycol  17 g Oral Daily    sodium chloride flush  10 mL IntraVENous 2 times per day    [Held by provider] heparin (porcine)  5,000 Units SubCUTAneous 3 times per day     PRN Meds: sodium chloride, promethazine, chlordiazePOXIDE-clidinium, fluticasone, simethicone, sodium chloride flush, sodium chloride, ondansetron **OR** ondansetron, acetaminophen **OR** acetaminophen, dextromethorphan-guaiFENesin, cetirizine, hydrALAZINE      Intake/Output Summary (Last 24 hours) at 12/23/2022 1019  Last data filed at 12/23/2022 5258  Gross per 24 hour   Intake 1020 ml   Output 1750 ml   Net -730 ml       Diet:  ADULT ORAL NUTRITION SUPPLEMENT; Breakfast, Dinner; Standard 4 oz Oral Supplement  ADULT DIET; Regular; 4 carb choices (60 gm/meal); Low Fat/Low Chol/High Fiber/2 gm Na; Less than 60 gm    Exam:  /78   Pulse 85   Temp 98.1 °F (36.7 °C) (Axillary)   Resp 18   Ht 5' (1.524 m)   Wt 121 lb 7.6 oz (55.1 kg)   SpO2 99%   BMI 23.72 kg/m²     General appearance: No apparent distress, appears older than stated age and cooperative. Oriented x 3. HEENT: Pupils equal, round, and reactive to light. Conjunctivae/corneas clear. MM dry with some cracking on lips - no vesicles. Neck: Supple, with full range of motion. Trachea midline. Respiratory:  Normal respiratory effort. Diminished, few rales in left bases, no wheezing, no rhonchi. No respiratory distress or accessory muscle use. Cardiovascular: Regular rate and rhythm with normal S1/S2, 2/6 DAVONTE LUSB,  No JVD. Abdomen: Soft, NO TTP today, non-distended with normal bowel sounds.   No rebound or guarding  Musculoskeletal: No clubbing, cyanosis or edema bilaterally. Full range of motion without deformity. No calf tenderness palpation  Skin: Skin color, texture, turgor normal.  No rashes or lesions. Neurologic:  Neurovascularly intact without any focal sensory/motor deficits. Cranial nerves: II-XII intact, grossly non-focal.  Psychiatric: Alert and oriented, flat affect, thought content appropriate, normal insight  Capillary Refill: Brisk,< 3 seconds   Peripheral Pulses: +2 palpable, equal bilaterally       All labs reviewed and interpreted by me:  Labs:   Recent Labs     12/21/22  0556 12/21/22  0950 12/22/22  0547 12/22/22  1545 12/23/22  0506   WBC 7.0  --  8.1  --  9.0   HGB 6.7*   < > 6.8* 9.5* 9.0*   HCT 21.0*   < > 21.1* 28.8* 28.3*   PLT 85*  --  89*  --  100*    < > = values in this interval not displayed. Recent Labs     12/21/22  0556 12/22/22  0547 12/23/22  0506    137 140   K 4.2 4.0 4.4    101 105   CO2 25 22* 24   BUN 81* 76* 28*   CREATININE 7.2* 6.9* 3.9*   CALCIUM 7.5* 6.5* 9.0   PHOS 5.2* 4.7  --      No results for input(s): AST, ALT, BILIDIR, BILITOT, ALKPHOS in the last 72 hours. No results for input(s): INR in the last 72 hours. No results for input(s): Curlie Lat in the last 72 hours. Urinalysis:      Lab Results   Component Value Date/Time    NITRU NEGATIVE 12/16/2022 05:20 PM    WBCUA 10-15 12/16/2022 05:20 PM    BACTERIA NONE SEEN 12/16/2022 05:20 PM    RBCUA 5-10 12/16/2022 05:20 PM    BLOODU TRACE 12/16/2022 05:20 PM    SPECGRAV 1.010 12/16/2022 05:20 PM    GLUCOSEU Negative 07/16/2021 04:09 PM    GLUCOSEU NEGATIVE 06/09/2020 03:03 PM       All radiology images and reports reviewed and interpreted by me:  Radiology:  IR FLUORO GUIDED CVA DEVICE PLMT/REPLACE/REMOVAL   Final Result   Status post successful nontunneled dialysis catheter insertion. **This report has been created using voice recognition software.   It may contain minor errors which are inherent in voice recognition technology. **      Final report electronically signed by Dr Savage Ovalle on 12/22/2022 1:41 PM      US RENAL COMPLETE   Final Result   Impression:   Echogenic bilateral kidneys, this represents chronic kidney disease. This document has been electronically signed by: Camila Castaneda MD on    12/20/2022 08:27 PM      CT CHEST WO CONTRAST   Final Result   Minimal atelectatic/fibrotic stranding left lung base and lingula. Small hiatus hernia. Distended gallbladder, likely related to not having eaten recently. **This report has been created using voice recognition software. It may contain minor errors which are inherent in voice recognition technology. **      Final report electronically signed by Dr. Avery Hirsch on 12/17/2022 5:56 PM      XR CHEST (2 VW)   Final Result   1. No interval change since previous study dated 9/18/2021. Austin Carmona **This report has been created using voice recognition software. It may contain minor errors which are inherent in voice recognition technology. **      Final report electronically signed by DR Mary Jo Shetty on 12/16/2022 11:36 AM          Diet: ADULT ORAL NUTRITION SUPPLEMENT; Breakfast, Dinner; Standard 4 oz Oral Supplement  ADULT DIET; Regular; 4 carb choices (60 gm/meal); Low Fat/Low Chol/High Fiber/2 gm Na; Less than 60 gm    Smith: no    Microbiology:  throat cx 12/16 (-)    Covid 12/16 (-), influenza 12/16 (-)    Urine cx 12/17 = contaminants    Tele review last 24 hrs:  SR, no arrhythmias    DVT prophylaxis: [] Lovenox                                 [] SCDs                                 [x] SQ Heparin                                 [] Encourage ambulation           [] Already on Anticoagulation     Disposition:    [x] Home with ? [facility]       [] TCU       [] Rehab       [] Psych       [] SNF       [] [facility]       [] Other-    Code Status: Full Code    PT/OT Eval Status: following      Electronically signed by José Miguel Watkins MD on 12/23/2022 at 10:19 AM

## 2022-12-23 NOTE — FLOWSHEET NOTE
Stable 3 hour TX completed. Removed 1.5 L of fluid as ordered. pt tolerated fluid removal well. Bilateral cath ports flushed with normal saline, clamped, and secured with tegos. CVC drsg clean, dry, and intact. Report called to primary RN. TX record printed for scanning into EMR.   12/23/22 1140 12/23/22 1509   Vital Signs   BP (!) 163/27 (!) 179/68   Temp 98.7 °F (37.1 °C) 98.2 °F (36.8 °C)   Heart Rate 84 80   Resp 23 20   SpO2 97 % 96 %   Weight 126 lb 12.2 oz (57.5 kg) 123 lb 7.3 oz (56 kg)   Weight Method Bed scale Bed scale   Post-Hemodialysis Assessment   Post-Treatment Procedures  --  Blood returned;Catheter Capped, clamped with Saline x2 ports   Machine Disinfection Process  --  Acid/Vinegar Clean;Heat Disinfect; Exterior Machine Disinfection   Blood Volume Processed (Liters)  --  43.4 l/min   Dialyzer Clearance  --  Lightly streaked   Duration of Treatment (minutes)  --  180 minutes   Heparin Amount Administered During Treatment (mL)  --  0 mL   Hemodialysis Intake (ml)  --  400 ml   Hemodialysis Output (ml)  --  1900 ml   NET Removed (ml)  --  1500   Tolerated Treatment  --  Good

## 2022-12-23 NOTE — PROGRESS NOTES
Moiséslynnette Shanika 58  INPATIENT OCCUPATIONAL THERAPY  [facility] RENAL TELEMETRY 6K  EVALUATION    Time:   Time In: 8192  Time Out: 1113  Timed Code Treatment Minutes: 9 Minutes  Minutes: 18          Date: 2022  Patient Name: Wilberto Solis,   Gender: female      MRN: [id number]  : 1955  (69 y.o.)  Referring Practitioner: CARLOS MANUEL Ladd CNP  Diagnosis: Acute kidney injury superimposed on chronic kidney disease  Additional Pertinent Hx: Wilberto Solis is a 69 y.o. female  with past medical history of hypertension,  obstructive sleep apnea, CVA on plavix, and chronic kidney disease stage IV. She presented  due to  nausea, vomiting and poor p.o. intake for 2 days preceded by upper respiratory tract infection symptoms for which she is takes doxycycline. She was admitted for ERUM and CKD and started on IV fluid. Today patient experienced 1 episode of  coughing up sputum mixed with mild hemoptysis. She mentioned that she also encountered epistaxis during the same time. Patient is non-smoker and she denies any previous history of similar complaints. She denied hematuria. hemoglobin is stable at 8.3. Creatinine 7.3. Had CT chest that showed minimal atelectasis of the left lung base and lingula    Restrictions/Precautions:  Restrictions/Precautions: General Precautions, Fall Risk  Position Activity Restriction  Other position/activity restrictions: tremors    Subjective  Chart Reviewed: Yes, Orders, Progress Notes, History and Physical  Patient assessed for rehabilitation services?: Yes  Response to previous treatment: Patient with no complaints from previous session  Family / Caregiver Present: Yes ()    Subjective: RN approved of OT session. Pt supine in bed and agreeable to therapy.     Pain: denies    Vitals: Vitals not assessed per clinical judgement, see nursing flowsheet    Social/Functional History:  Lives With: Spouse  Type of Home: House  Home Layout: One level  Home Access: Stairs to enter without rails  Entrance Stairs - Number of Steps: 2 MARIANA  Home Equipment: Marie Maryjo, Rollator   Bathroom Shower/Tub: Tub/Shower unit  Bathroom Toilet: Standard  Bathroom Equipment: Shower chair  IADL Comments:  completes most cooking and cleaning tasks       ADL Assistance: Independent  Homemaking Assistance: Needs assistance  Ambulation Assistance: Independent  Transfer Assistance: Independent    Active : Yes (short distances)  Occupation: Retired  Additional Comments: pt was intermittently using a walker prior to feeling weak and sick, has been using it more in the last 2 weeks. VISION:WFL    HEARING:  WFL    COGNITION: Slow Processing, Decreased Insight, Decreased Problem Solving, and Decreased Safety Awareness    RANGE OF MOTION:  Bilateral Upper Extremity:  WFL    STRENGTH:  Bilateral Upper Extremity:  WFL    SENSATION:   WFL    ADL:   Grooming: Contact Guard Assistance. Standing sinkside x2 minutes for hand hygiene and oral care   Lower Extremity Dressing: Minimal Assistance. For donning new depends - A to adjsut over hips    Toileting: [employer] for ortiz care and min A for clothing management  Toilet Transfer: [employer]. To/from standard toilet . BALANCE:  Sitting Balance:  Stand By Assistance. Standing Balance: Contact Guard Assistance. Standing x2 minutes     BED MOBILITY:  Supine to Sit: Stand By Assistance    Sit to Supine: Stand By Assistance      TRANSFERS:  Sit to Stand:  [employer]. Stand to Sit: Contact Guard Assistance. FUNCTIONAL MOBILITY:  Assistive Device: 4 Wheeled Walker  Assist Level:  Contact Guard Assistance. Distance: To and from bathroom and short distance in hallway   Unsteady however no LOB; required x3 cues for safety (walker proximity, manuevering around objects, etc.)        Activity Tolerance:  Patient tolerance of  treatment: good.        Assessment:  Assessment: Pt admitted to the hospital d/t ERUM. Pt demo'ed performance deficits with requiring assistance for ADL tasks, functional mobility and transfers which effect ability to perform ADLs and IADLs. Pt displayed  decreased endurance, balance, safety awareness  and ability to complete  ADL tasks and mobility at OF. Pt requires further skilled OT Services to improve performance in functional tasks and educate on safety awareness for more indep with ADL tasks and mobility to return  home. Performance deficits / Impairments: Decreased functional mobility , Decreased ADL status, Decreased endurance, Decreased posture, Decreased ROM, Decreased balance, Decreased strength, Decreased high-level IADLs, Decreased safe awareness, Decreased cognition  Prognosis: Fair  REQUIRES OT FOLLOW-UP: Yes  Decision Making: Medium Complexity    Treatment Initiated: Treatment and education initiated within context of evaluation. Evaluation time included review of current medical information, gathering information related to past medical, social and functional history, completion of standardized testing, formal and informal observation of tasks, assessment of data and development of plan of care and goals. Treatment time included skilled education and facilitation of tasks to increase safety and independence with ADL's for improved functional independence and quality of life. Discharge Recommendations:  Continue to assess pending progress, Patient would benefit from continued therapy after discharge    Patient Education:     Patient Education  Education Given To: Family, Patient  Education Provided: Role of Therapy, Plan of Care, ADL Adaptive Strategies, Transfer Training, Fall Prevention Strategies  Education Method: Demonstration, Verbal  Education Outcome: Verbalized understanding, Demonstrated understanding, Continued education needed    Equipment Recommendations:  Equipment Needed: No    Plan:  Times Per Week: 5x  Times Per Day:  Once a day  Current Treatment Recommendations: Strengthening, Balance training, Functional mobility training, Endurance training, Equipment evaluation, education, & procurement, Patient/Caregiver education & training, Safety education & training, Self-Care / ADL, Home management training, ROM. See long-term goal time frame for expected duration of plan of care. If no long-term goals established, a short length of stay is anticipated. Goals:  Patient goals : return home  Short Term Goals  Time Frame for Short Term Goals: by discharge  Short Term Goal 1: pt will complete functional mobility to/from bathroom and [facility] distances with SBA, no more than 1 vc for safety awareness, to access ADLs  Short Term Goal 2: pt will complete grooming tasks while standing sink side x5 minutes with SBA to increase indep with ADLS  Short Term Goal 3: pt will complete LB ADLs with SBA using modifications/LHAE prn to increase indep with donning pants  Long Term Goals  Time Frame for Long Term Goals : no LTG est d/t short ELOS         Following session, patient left in safe position with all fall risk precautions in place.

## 2022-12-23 NOTE — PLAN OF CARE
Problem: Discharge Planning  Goal: Discharge to home or other facility with appropriate resources  12/23/2022 0236 by Odette Esposito RN  Outcome: Progressing  Flowsheets (Taken 12/20/2022 2303 by Marizol Santos, RN)  Discharge to home or other facility with appropriate resources:   Identify barriers to discharge with patient and caregiver   Arrange for needed discharge resources and transportation as appropriate   Identify discharge learning needs (meds, wound care, etc)   Arrange for interpreters to assist at discharge as needed   Refer to discharge planning if patient needs post-hospital services based on physician order or complex needs related to functional status, cognitive ability or social support system  12/23/2022 0236 by Odette Esposito RN  Outcome: Progressing     Problem: Pain  Goal: Verbalizes/displays adequate comfort level or baseline comfort level  12/23/2022 0236 by Odette Esposito RN  Outcome: Progressing  Flowsheets (Taken 12/20/2022 2303 by Marizol Santos RN)  Verbalizes/displays adequate comfort level or baseline comfort level:   Encourage patient to monitor pain and request assistance   Assess pain using appropriate pain scale   Administer analgesics based on type and severity of pain and evaluate response   Implement non-pharmacological measures as appropriate and evaluate response   Consider cultural and social influences on pain and pain management   Notify Licensed Independent Practitioner if interventions unsuccessful or patient reports new pain  12/23/2022 0236 by Odette Esposito RN  Outcome: Progressing     Problem: Skin/Tissue Integrity  Goal: Absence of new skin breakdown  Description: 1. Monitor for areas of redness and/or skin breakdown  2. Assess vascular access sites hourly  3. Every 4-6 hours minimum:  Change oxygen saturation probe site  4.   Every 4-6 hours:  If on nasal continuous positive airway pressure, respiratory therapy assess nares and determine need for appliance change or resting period.   12/23/2022 0236 by Samreen Salinas RN  Outcome: Progressing  12/23/2022 0236 by Samreen Salinas RN  Outcome: Progressing     Problem: Safety - Adult  Goal: Free from fall injury  12/23/2022 0236 by Samreen Salinas RN  Outcome: Progressing  Flowsheets (Taken 12/20/2022 2303 by Brooklyn Schaeffer RN)  Free From Fall Injury:   Instruct family/caregiver on patient safety   Based on caregiver fall risk screen, instruct family/caregiver to ask for assistance with transferring infant if caregiver noted to have fall risk factors  12/23/2022 0236 by Samreen Salinas RN  Outcome: Progressing     Problem: ABCDS Injury Assessment  Goal: Absence of physical injury  12/23/2022 0236 by Samreen Salinas RN  Outcome: Progressing  Flowsheets (Taken 12/23/2022 0236)  Absence of Physical Injury: Implement safety measures based on patient assessment  12/23/2022 0236 by Samreen Salinas RN  Outcome: Progressing  Flowsheets (Taken 12/23/2022 0236)  Absence of Physical Injury: Implement safety measures based on patient assessment     Problem: Genitourinary - Adult  Goal: Absence of urinary retention  12/23/2022 0236 by Samreen Salinas RN  Outcome: Progressing  [address] (Taken 12/20/2022 2303 by Brooklyn Schaeffer RN)  Absence of urinary retention:   Assess patients ability to void and empty bladder   Monitor intake/output and perform bladder scan as needed   Place urinary catheter per Licensed Independent Practitioner order if needed   Discuss with Licensed Independent Practitioner  medications to alleviate retention as needed   Discuss catheterization for long term situations as appropriate  12/23/2022 0236 by Samreen Salinas RN  Outcome: Progressing     Problem: Metabolic/Fluid and Electrolytes - Adult  Goal: Electrolytes maintained within normal limits  12/23/2022 0236 by Samreen Salinas RN  Outcome: Progressing  Flowsheets (Taken 12/20/2022 2303 by Brooklyn Schaeffer RN)  Electrolytes maintained within normal limits:   Monitor labs and assess patient for signs and symptoms of electrolyte imbalances   Administer electrolyte replacement as ordered   Monitor response to electrolyte replacements, including repeat lab results as appropriate   Fluid restriction as ordered   Instruct patient on fluid and nutrition restrictions as appropriate  12/23/2022 0236 by Kari Venegas RN  Outcome: Progressing  Goal: Hemodynamic stability and optimal renal function maintained  12/23/2022 0236 by Kari Venegas RN  Outcome: Progressing  [address] [place] (Taken 12/20/2022 2303 by Karime Balderas RN)  Hemodynamic stability and optimal renal function maintained:   Monitor labs and assess for signs and symptoms of volume excess or deficit   Monitor intake, output and patient weight   Monitor urine specific gravity, serum osmolarity and serum sodium as indicated or ordered   Monitor response to interventions for patient's volume status, including labs, urine output, blood pressure (other measures as available)   Encourage oral intake as appropriate   Instruct patient on fluid and nutrition restrictions as appropriate  12/23/2022 0236 by Kari Venegas RN  Outcome: Progressing     Problem: Metabolic/Fluid and Electrolytes - Adult  Goal: Hemodynamic stability and optimal renal function maintained  12/23/2022 0236 by Kari Venegas RN  Outcome: Progressing  [address] (Taken 12/20/2022 2303 by Karime Balderas RN)  Hemodynamic stability and optimal renal function maintained:   Monitor labs and assess for signs and symptoms of volume excess or deficit   Monitor intake, output and patient weight   Monitor urine specific gravity, serum osmolarity and serum sodium as indicated or ordered   Monitor response to interventions for patient's volume status, including labs, urine output, blood pressure (other measures as available)   Encourage oral intake as appropriate   Instruct patient on fluid and nutrition restrictions as appropriate  12/23/2022 0236 by Kari Venegas RN  Outcome: Progressing Problem: Chronic Conditions and Co-morbidities  Goal: Patient's chronic conditions and co-morbidity symptoms are monitored and maintained or improved  12/23/2022 0236 by Paul Sierra RN  Outcome: Progressing  [address] (Taken 12/20/2022 2303 by Nguyen Aguayo RN)  Care Plan - Patient's Chronic Conditions and Co-Morbidity Symptoms are Monitored and Maintained or Improved:   Monitor and assess patient's chronic conditions and comorbid symptoms for stability, deterioration, or improvement   Collaborate with multidisciplinary team to address chronic and comorbid conditions and prevent exacerbation or deterioration   Update acute care plan with appropriate goals if chronic or comorbid symptoms are exacerbated and prevent overall improvement and discharge  12/23/2022 0236 by Paul Sierra RN  Outcome: Progressing     Problem: Nutrition Deficit:  Goal: Optimize nutritional status  12/23/2022 0236 by Paul Sierra RN  Outcome: Progressing  Flowsheets (Taken 12/20/2022 2303 by Nguyen Aguayo RN)  Nutrient intake appropriate for improving, restoring, or maintaining nutritional needs:   Assess nutritional status and recommend course of action   Monitor oral intake, labs, and treatment plans   Recommend appropriate diets, oral nutritional supplements, and vitamin/mineral supplements   Order, calculate, and assess calorie counts as needed   Recommend, monitor, and adjust tube feedings and TPN/PPN based on assessed needs   Provide specific nutrition education to patient or family as appropriate  12/23/2022 0236 by Paul Sierra RN  Outcome: Progressing

## 2022-12-23 NOTE — PROGRESS NOTES
[facility]   Nephrology progress note  12/23/2022, 12:41 PM      Pt Name:    Lori Cohen  MRN:     [id number]     YOB: 1955  Admit Date:    12/16/2022 10:35 AM    Chief Complaint: Nephrology following for acute kidney injury and metabolic acidosis. Subjective:  Patient seen and examined earlier today during rounds  Discussed with patient and family member in detail  Input and output reviewed  Had dialysis treatment #1 yesterday  On half-normal saline      Objective:  24HR INTAKE/OUTPUT:    Intake/Output Summary (Last 24 hours) at 12/23/2022 1241  Last data filed at 12/23/2022 0412  Gross per 24 hour   Intake 1020 ml   Output 1750 ml   Net -730 ml        Admission weight: 114 lb (51.7 kg)  Wt Readings from Last 3 Encounters:   12/23/22 126 lb 12.2 oz (57.5 kg)   12/16/22 114 lb (51.7 kg)   12/12/22 116 lb (52.6 kg)        Vitals :   Vitals:    12/23/22 0600 12/23/22 0830 12/23/22 1115 12/23/22 1140   BP:  132/78 (!) 148/72 (!) 163/27   Pulse:  85 81 84   Resp:  18 18 23   Temp:  98.1 °F (36.7 °C) 98.2 °F (36.8 °C) 98.7 °F (37.1 °C)   TempSrc:  Axillary Oral    SpO2:  99% 98% 97%   Weight: 121 lb 7.6 oz (55.1 kg)   126 lb 12.2 oz (57.5 kg)   Height:           Physical examination  General Appearance:  Well developed.  No distress  Mouth/Throat:  Oral mucosa moist  Neck:  Supple, no JVD  Lungs: No use of accessory muscle use  Heart[de-identified]  S1,S2 heard  Abdomen:  Soft, non - tender    Medications:  Infusion:    sodium chloride      sodium chloride 100 mL/hr at 12/23/22 0471    sodium chloride       Meds:    epoetin mumtaz-epbx  2,000 Units SubCUTAneous Once per day on Mon Wed Fri    And    epoetin mumtaz-epbx  3,000 Units SubCUTAneous Once per day on Mon Wed Fri    [Held by provider] magnesium oxide  400 mg Oral BID    calcium replacement protocol   Other RX Placeholder    calcitRIOL  0.25 mcg Oral Once per day on Sun Tue Thu Sat    docusate sodium  100 mg Oral Daily    senna  1 tablet Oral Nightly    [Held by provider] potassium bicarb-citric acid  40 mEq Oral Daily    amLODIPine  5 mg Oral Daily    [Held by provider] aspirin  81 mg Oral Daily    buPROPion  200 mg Oral BID    [Held by provider] clopidogrel  75 mg Oral Daily    folic acid  922 mcg Oral Daily    montelukast  10 mg Oral Daily    atorvastatin  10 mg Oral Daily    traZODone  50 mg Oral Nightly    polyethylene glycol  17 g Oral Daily    sodium chloride flush  10 mL IntraVENous 2 times per day    [Held by provider] heparin (porcine)  5,000 Units SubCUTAneous 3 times per day       Lab Data :  CBC:   Recent Labs     12/21/22  0556 12/21/22  0950 12/22/22  0547 12/22/22  1545 12/23/22  0506   WBC 7.0  --  8.1  --  9.0   HGB 6.7*   < > 6.8* 9.5* 9.0*   HCT 21.0*   < > 21.1* 28.8* 28.3*   PLT 85*  --  89*  --  100*    < > = values in this interval not displayed. CMP:  Recent Labs     12/21/22  0556 12/22/22  0547 12/23/22  0506    137 140   K 4.2 4.0 4.4    101 105   CO2 25 22* 24   BUN 81* 76* 28*   CREATININE 7.2* 6.9* 3.9*   GLUCOSE 74 70 82   CALCIUM 7.5* 6.5* 9.0   MG 2.0 1.9  --    PHOS 5.2* 4.7  --        Hepatic:   No results for input(s): LABALBU, AST, ALT, ALB, BILITOT, ALKPHOS in the last 72 hours. Assessment and Plan:  Renal -acute kidney injury most likely secondary to severe volume depletion but cannot rule out ATN at this time. No suggestion of recovery at this time. Will get patient dialyzed today. Patient is up 3 L from yesterday. Slow IV fluid replacement. Monitor. Plan dialysis treatment today  Serological work-up is in process  Ultrasound negative  If no significant improvement in renal function then consider renal biopsy next week  Electrolytes -within normal limits  Acid-base status-improved  Hypocalcemia: Improved  CKD stage IV with baseline creatinine 1.8-2.0  Hx of fibromyalgia  Essential hypertension stable  Thrombocytopenia improving  Anemia and renal insufficiency.   Continue with erythropoietin stimulating agents  Discussed with patient and family member in detail    Nelida Martin MD  [facility]    This report has been created using voice recognition software.  It may contain minor errors which are inherent in voice recognition technology

## 2022-12-23 NOTE — PROGRESS NOTES
[address] [address]  INPATIENT PHYSICAL THERAPY  Daily Note  [facility] RENAL TELEMETRY 6K - [id number]    Time In: 0745  Time Out: 1475  Timed Code Treatment Minutes: 25 Minutes  Minutes: 25          Date: 2022  Patient Name: Gaby Navarro,  Gender:  female        MRN: [id number]  : 1955  (69 y.o.)     Referring Practitioner: CARLOS MANUEL Oliver CNP  Diagnosis: dehydration  Additional Pertinent Hx: Per EMR Ephriam Field Yamilka Zamarripa is a 69 y.o. female who presents to the emergency department for evaluation of, vomiting  Patient presents to the emergency department after being seen in the office because of 4 days of multiple episodes of nausea, emesis, not tolerating oral intake, lower abdominal pain, fatigue, feeling dehydrated; patient was diagnosed with sinusitis being prescribed with doxycycline some days ago, symptoms referred at that time were rhinorrhea, cough, mild shortness of breath; symptoms did not improve and were joint with nausea emesis and abdominal pain. Symptoms low urinary output, dark urine. Was concerned about kidney failure due to history of CKD. \"     Prior Level of Function:  Lives With: Spouse  Type of Home: House  Home Layout: One level  Home Access: Stairs to enter without rails  Entrance Stairs - Number of Steps: 2 MARIANA  Home Equipment: Earle Ingles, Rollator   Bathroom Shower/Tub: Walk-in shower  Bathroom Toilet: Standard  Bathroom Equipment: Shower chair    ADL Assistance: Independent  Homemaking Assistance: Needs assistance  Ambulation Assistance: Independent  Transfer Assistance: Independent  Active : Yes (short distances)  IADL Comments:  completes most cooking and cleaning tasks  Additional Comments: pt was intermittently using a walker prior to feeling weak and sick, has been using it more in the last 2 weeks.     Restrictions/Precautions:  Restrictions/Precautions: General Precautions, Fall Risk  Position Activity Restriction  Other position/activity restrictions: tremors     SUBJECTIVE: Pt. Laying in her bed and pleasantly agrees to therapy session. Spouse arrived during session. Pt. Slightly impulsive during session requiring cues for safety. Pt. Requests to use restroom during session. RN approved therapy session. PAIN: None indicated    Vitals:   Patient Vitals for the past 8 hrs:   BP Temp Temp src Pulse Resp SpO2   12/23/22 0249 137/72 98.4 °F (36.9 °C) Oral 76 17 92 %        OBJECTIVE:  Bed Mobility:  Rolling to Right: Stand By Assistance   Supine to Sit: Stand By Assistance  Scooting: Stand By Assistance    Transfers:  Sit to Stand: Stand By Assistance  Stand to Sit:Stand By Assistance    Ambulation:  Contact Guard Assistance  Distance: 18' x 2  Surface: Level Tile  Device: 4 Wheeled Walker  Gait Deviations:  Slow Anna, Decreased Step Length Bilaterally, Decreased Gait Speed, Decreased Heel Strike Bilaterally, Mild Path Deviations, Unsteady Gait, and Decreased Terminal Knee Extension    Stairs:  None    Balance:  Dynamic Standing Balance: Contact Guard Assistance  Pt. Stood while PTA assisted with clothing management before and after toileting. Pt. Slightly unsteady with standing requiring CGA for safety. Neuromuscular Re-education  None    Exercise:  Patient was guided in 1 set(s) 10 reps of exercises: Glut sets, Seated marches, Seated heel/toe raises, Long arc quads, Seated isometric hip adduction, Seated abduction/adduction. Exercises were completed for increased independence with functional mobility. Functional Outcome Measures:   Not completed    ASSESSMENT:  Assessment: Patient progressing toward established goals. Activity Tolerance:  Patient tolerance of  treatment: good.      Equipment Recommendations:Equipment Needed: No  Discharge Recommendations: Continue to assess pending progress, Patient would benefit from continued therapy after discharge     Plan: Current Treatment Recommendations: Strengthening, Balance training, Gait training, Stair training, Functional mobility training, Transfer training, Neuromuscular re-education, Endurance training, Equipment evaluation, education, & procurement, Patient/Caregiver education & training, Safety education & training, Therapeutic activities, Home exercise program  General Plan:  (5x GM)    Patient Education  Patient Education: Plan of Care, Functional Mobility, Reviewed Prior Education, Health Promotion and Wellness Education, Safety, Verbal Exercise Instruction    Goals:  Patient Goals : therapy services HH vs outpatient  Short Term Goals  Time Frame for Short Term Goals: by discharge  Short Term Goal 1: Pt will amb for >100 feet with S with RW for support to return home safely. Short Term Goal 2: Pt will demo sit to/from stand transfers with RW for support with IND to return home safely. Short Term Goal 3: Pt will demo SBA for stair negotiation with rail for support as needed to return home safely. Short Term Goal 4: Pt will demo IND with bed mobility tasks with bed flat to return home safely. Long Term Goals  Time Frame for Long Term Goals : NA due to short ELOS    Following session, patient left in safe position with all fall risk precautions in place.

## 2022-12-24 LAB
ANION GAP SERPL CALCULATED.3IONS-SCNC: 9 MEQ/L (ref 8–16)
ANTINUCLEAR ANTIBODY, HEP-2, IGG: NORMAL
BASOPHILS # BLD: 0.8 %
BASOPHILS ABSOLUTE: 0.1 THOU/MM3 (ref 0–0.1)
BUN BLDV-MCNC: 16 MG/DL (ref 7–22)
CALCIUM SERPL-MCNC: 7.7 MG/DL (ref 8.5–10.5)
CHLORIDE BLD-SCNC: 108 MEQ/L (ref 98–111)
CO2: 24 MEQ/L (ref 23–33)
CREAT SERPL-MCNC: 3 MG/DL (ref 0.4–1.2)
EOSINOPHIL # BLD: 1.3 %
EOSINOPHILS ABSOLUTE: 0.1 THOU/MM3 (ref 0–0.4)
ERYTHROCYTE [DISTWIDTH] IN BLOOD BY AUTOMATED COUNT: 17.9 % (ref 11.5–14.5)
ERYTHROCYTE [DISTWIDTH] IN BLOOD BY AUTOMATED COUNT: 58.9 FL (ref 35–45)
GFR SERPL CREATININE-BSD FRML MDRD: 16 ML/MIN/1.73M2
GLUCOSE BLD-MCNC: 96 MG/DL (ref 70–108)
HCT VFR BLD CALC: 27.3 % (ref 37–47)
HEMOGLOBIN: 8.7 GM/DL (ref 12–16)
IMMATURE GRANS (ABS): 0.12 THOU/MM3 (ref 0–0.07)
IMMATURE GRANULOCYTES: 1.6 %
LYMPHOCYTES # BLD: 23 %
LYMPHOCYTES ABSOLUTE: 1.8 THOU/MM3 (ref 1–4.8)
MCH RBC QN AUTO: 28.8 PG (ref 26–33)
MCHC RBC AUTO-ENTMCNC: 31.9 GM/DL (ref 32.2–35.5)
MCV RBC AUTO: 90.4 FL (ref 81–99)
MONOCYTES # BLD: 10.5 %
MONOCYTES ABSOLUTE: 0.8 THOU/MM3 (ref 0.4–1.3)
NUCLEATED RED BLOOD CELLS: 0 /100 WBC
PLATELET # BLD: 109 THOU/MM3 (ref 130–400)
PMV BLD AUTO: 11.5 FL (ref 9.4–12.4)
POTASSIUM REFLEX MAGNESIUM: 4.3 MEQ/L (ref 3.5–5.2)
RBC # BLD: 3.02 MILL/MM3 (ref 4.2–5.4)
SEG NEUTROPHILS: 62.8 %
SEGMENTED NEUTROPHILS ABSOLUTE COUNT: 4.8 THOU/MM3 (ref 1.8–7.7)
SODIUM BLD-SCNC: 141 MEQ/L (ref 135–145)
WBC # BLD: 7.7 THOU/MM3 (ref 4.8–10.8)

## 2022-12-24 PROCEDURE — 85025 COMPLETE CBC W/AUTO DIFF WBC: CPT

## 2022-12-24 PROCEDURE — 6370000000 HC RX 637 (ALT 250 FOR IP)

## 2022-12-24 PROCEDURE — 80048 BASIC METABOLIC PNL TOTAL CA: CPT

## 2022-12-24 PROCEDURE — 6370000000 HC RX 637 (ALT 250 FOR IP): Performed by: INTERNAL MEDICINE

## 2022-12-24 PROCEDURE — 6370000000 HC RX 637 (ALT 250 FOR IP): Performed by: PHYSICIAN ASSISTANT

## 2022-12-24 PROCEDURE — 36415 COLL VENOUS BLD VENIPUNCTURE: CPT

## 2022-12-24 PROCEDURE — 6360000002 HC RX W HCPCS: Performed by: PHYSICIAN ASSISTANT

## 2022-12-24 PROCEDURE — 1200000003 HC TELEMETRY R&B

## 2022-12-24 PROCEDURE — 2580000003 HC RX 258: Performed by: PHYSICIAN ASSISTANT

## 2022-12-24 RX ADMIN — TRAZODONE HYDROCHLORIDE 50 MG: 50 TABLET ORAL at 20:32

## 2022-12-24 RX ADMIN — BUPROPION HYDROCHLORIDE 200 MG: 100 TABLET, FILM COATED, EXTENDED RELEASE ORAL at 07:58

## 2022-12-24 RX ADMIN — MONTELUKAST SODIUM 10 MG: 10 TABLET ORAL at 20:32

## 2022-12-24 RX ADMIN — SODIUM CHLORIDE, PRESERVATIVE FREE 10 ML: 5 INJECTION INTRAVENOUS at 20:33

## 2022-12-24 RX ADMIN — SENNOSIDES 8.6 MG: 8.6 TABLET, COATED ORAL at 20:32

## 2022-12-24 RX ADMIN — ATORVASTATIN CALCIUM 10 MG: 10 TABLET, FILM COATED ORAL at 07:57

## 2022-12-24 RX ADMIN — CALCITRIOL CAPSULES 0.25 MCG 0.25 MCG: 0.25 CAPSULE ORAL at 07:57

## 2022-12-24 RX ADMIN — AMLODIPINE BESYLATE 5 MG: 5 TABLET ORAL at 07:57

## 2022-12-24 RX ADMIN — POLYETHYLENE GLYCOL 3350 17 G: 17 POWDER, FOR SOLUTION ORAL at 07:58

## 2022-12-24 RX ADMIN — ONDANSETRON 4 MG: 2 INJECTION INTRAMUSCULAR; INTRAVENOUS at 11:10

## 2022-12-24 RX ADMIN — FOLIC ACID 500 MCG: 1 TABLET ORAL at 07:57

## 2022-12-24 RX ADMIN — SODIUM CHLORIDE, PRESERVATIVE FREE 10 ML: 5 INJECTION INTRAVENOUS at 07:58

## 2022-12-24 RX ADMIN — DOCUSATE SODIUM 100 MG: 100 CAPSULE, LIQUID FILLED ORAL at 07:58

## 2022-12-24 RX ADMIN — BUPROPION HYDROCHLORIDE 200 MG: 100 TABLET, FILM COATED, EXTENDED RELEASE ORAL at 20:31

## 2022-12-24 ASSESSMENT — PAIN SCALES - GENERAL: PAINLEVEL_OUTOF10: 0

## 2022-12-24 NOTE — PLAN OF CARE
Problem: Discharge Planning  Goal: Discharge to home or other facility with appropriate resources  12/24/2022 1029 by Alberto Crabtree RN  Outcome: Progressing  Flowsheets (Taken 12/24/2022 0800)  Discharge to home or other facility with appropriate resources: Identify barriers to discharge with patient and caregiver  12/23/2022 2302 by Adrienne Rubin RN  Outcome: Progressing  Flowsheets (Taken 12/23/2022 2002)  Discharge to home or other facility with appropriate resources:   Identify barriers to discharge with patient and caregiver   Identify discharge learning needs (meds, wound care, etc)     Problem: Pain  Goal: Verbalizes/displays adequate comfort level or baseline comfort level  12/24/2022 1029 by Alberto Crabtree RN  Outcome: Progressing  Flowsheets (Taken 12/24/2022 0745)  Verbalizes/displays adequate comfort level or baseline comfort level: Encourage patient to monitor pain and request assistance  12/23/2022 2302 by Adrienne Rubin RN  Outcome: Progressing  Flowsheets (Taken 12/23/2022 2002)  Verbalizes/displays adequate comfort level or baseline comfort level:   Encourage patient to monitor pain and request assistance   Assess pain using appropriate pain scale   Administer analgesics based on type and severity of pain and evaluate response   Implement non-pharmacological measures as appropriate and evaluate response   Consider cultural and social influences on pain and pain management     Problem: Skin/Tissue Integrity  Goal: Absence of new skin breakdown  Description: 1. Monitor for areas of redness and/or skin breakdown  2. Assess vascular access sites hourly  3. Every 4-6 hours minimum:  Change oxygen saturation probe site  4. Every 4-6 hours:  If on nasal continuous positive airway pressure, respiratory therapy assess nares and determine need for appliance change or resting period.   12/24/2022 1029 by Alberto Crabtree RN  Outcome: Progressing  12/23/2022 2302 by Adrienne Rubin RN  Outcome: Progressing     Problem: Safety - Adult  Goal: Free from fall injury  12/24/2022 1029 by Deonte Toledo RN  Outcome: Progressing  12/23/2022 2302 by Milady Soriano RN  Outcome: Progressing  Flowsheets (Taken 12/23/2022 2302)  Free From Fall Injury: Instruct family/caregiver on patient safety     Problem: ABCDS Injury Assessment  Goal: Absence of physical injury  12/24/2022 1029 by Deonte Toledo RN  Outcome: Progressing  12/23/2022 2302 by Milady Soriano RN  Outcome: Progressing  Flowsheets (Taken 12/23/2022 2302)  Absence of Physical Injury: Implement safety measures based on patient assessment     Problem: Genitourinary - Adult  Goal: Absence of urinary retention  12/24/2022 1029 by Deonte Toledo RN  Outcome: Progressing  12/23/2022 2302 by Milady Soriano RN  Outcome: Progressing  Flowsheets (Taken 12/23/2022 2002)  Absence of urinary retention: Assess patients ability to void and empty bladder     Problem: Metabolic/Fluid and Electrolytes - Adult  Goal: Electrolytes maintained within normal limits  12/24/2022 1029 by Deonte Toledo RN  Outcome: Progressing  Flowsheets (Taken 12/24/2022 0800)  Electrolytes maintained within normal limits: Monitor labs and assess patient for signs and symptoms of electrolyte imbalances  12/23/2022 2302 by Milady Soriano RN  Outcome: Progressing  Flowsheets (Taken 12/23/2022 2002)  Electrolytes maintained within normal limits:   Monitor labs and assess patient for signs and symptoms of electrolyte imbalances   Administer electrolyte replacement as ordered   Monitor response to electrolyte replacements, including repeat lab results as appropriate  Goal: Hemodynamic stability and optimal renal function maintained  12/24/2022 1029 by Deonte Toledo RN  Outcome: Progressing  Flowsheets (Taken 12/24/2022 0800)  Hemodynamic stability and optimal renal function maintained: Monitor labs and assess for signs and symptoms of volume excess or deficit  12/23/2022 2302 by Sakshi Mcmahon RN  Outcome: Progressing  Flowsheets (Taken 12/23/2022 2002)  Hemodynamic stability and optimal renal function maintained:   Monitor labs and assess for signs and symptoms of volume excess or deficit   Monitor intake, output and patient weight   Encourage oral intake as appropriate   Monitor response to interventions for patient's volume status, including labs, urine output, blood pressure (other measures as available)     Problem: Chronic Conditions and Co-morbidities  Goal: Patient's chronic conditions and co-morbidity symptoms are monitored and maintained or improved  12/24/2022 1029 by Manolo Faye RN  Outcome: Progressing  Flowsheets (Taken 12/24/2022 0800)  Care Plan - Patient's Chronic Conditions and Co-Morbidity Symptoms are Monitored and Maintained or Improved: Monitor and assess patient's chronic conditions and comorbid symptoms for stability, deterioration, or improvement  12/23/2022 2302 by Sakshi Mcmahon RN  Outcome: Progressing  [address] (Taken 12/23/2022 2002)  Care Plan - Patient's Chronic Conditions and Co-Morbidity Symptoms are Monitored and Maintained or Improved:   Monitor and assess patient's chronic conditions and comorbid symptoms for stability, deterioration, or improvement   Collaborate with multidisciplinary team to address chronic and comorbid conditions and prevent exacerbation or deterioration     Problem: Nutrition Deficit:  Goal: Optimize nutritional status  12/24/2022 1029 by Manolo Faye RN  Outcome: Progressing  12/23/2022 2302 by Sakshi Mcmahon RN  Outcome: Progressing  Flowsheets (Taken 12/23/2022 2302)  Nutrient intake appropriate for improving, restoring, or maintaining nutritional needs: Monitor oral intake, labs, and treatment plans

## 2022-12-24 NOTE — PROGRESS NOTES
[facility]   Nephrology progress note  12/24/2022, 2:04 PM      Pt Name:    Raiza Rodriguez  MRN:     [id number]     YOB: 1955  Admit Date:    12/16/2022 10:35 AM    Chief Complaint: Nephrology following for acute kidney injury and metabolic acidosis.     Subjective:  Patient seen and examined earlier today during rounds  Discussed with patient and family member in detail  Input and output reviewed  Had dialysis treatment #2 yesterday  Awake and alert  Family member at bedside  Patient is on room air  No acute distress      Objective:  24HR INTAKE/OUTPUT:    Intake/Output Summary (Last 24 hours) at 12/24/2022 1404  Last data filed at 12/24/2022 1136  Gross per 24 hour   Intake 695 ml   Output 2100 ml   Net -1405 ml        Admission weight: 114 lb (51.7 kg)  Wt Readings from Last 3 Encounters:   12/24/22 114 lb 9.6 oz (52 kg)   12/16/22 114 lb (51.7 kg)   12/12/22 116 lb (52.6 kg)        Vitals :   Vitals:    12/23/22 2305 12/24/22 0353 12/24/22 0745 12/24/22 1136   BP: 128/64 (!) 112/57 (!) 143/73 135/68   Pulse: 93 90 90 86   Resp: 16 18 18 18   Temp: 98.5 °F (36.9 °C) 98.8 °F (37.1 °C) 97.7 °F (36.5 °C) 98.9 °F (37.2 °C)   TempSrc: Oral Oral Oral Oral   SpO2: 95% 97% 96% 100%   Weight:  114 lb 9.6 oz (52 kg)     Height:           Physical examination  General Appearance:  No distress, awake and alert  Neck:  Supple, no JVD  Lungs: No use of accessory muscle use, no labored breathing, on room air  Abdomen:  Soft, non - tender  Ext: Trace edema    Medications:  Infusion:    sodium chloride      [Held by provider] sodium chloride Stopped (12/23/22 1525)    sodium chloride       Meds:    epoetin mumtaz-epbx  2,000 Units SubCUTAneous Once per day on Mon Wed Fri    And    epoetin mumtaz-epbx  3,000 Units SubCUTAneous Once per day on Mon Wed Fri    [Held by provider] magnesium oxide  400 mg Oral BID    calcium replacement protocol   Other RX Placeholder    calcitRIOL  0.25 mcg Oral Once per day on Sun Tue Thu Sat    docusate sodium  100 mg Oral Daily    senna  1 tablet Oral Nightly    [Held by provider] potassium bicarb-citric acid  40 mEq Oral Daily    amLODIPine  5 mg Oral Daily    [Held by provider] aspirin  81 mg Oral Daily    buPROPion  200 mg Oral BID    [Held by provider] clopidogrel  75 mg Oral Daily    folic acid  924 mcg Oral Daily    montelukast  10 mg Oral Daily    atorvastatin  10 mg Oral Daily    traZODone  50 mg Oral Nightly    polyethylene glycol  17 g Oral Daily    sodium chloride flush  10 mL IntraVENous 2 times per day    [Held by provider] heparin (porcine)  5,000 Units SubCUTAneous 3 times per day       Lab Data :  CBC:   Recent Labs     12/22/22  0547 12/22/22  1545 12/23/22  0506 12/24/22  0548   WBC 8.1  --  9.0 7.7   HGB 6.8* 9.5* 9.0* 8.7*   HCT 21.1* 28.8* 28.3* 27.3*   PLT 89*  --  100* 109*       CMP:  Recent Labs     12/22/22  0547 12/23/22  0506 12/24/22  0548    140 141   K 4.0 4.4 4.3    105 108   CO2 22* 24 24   BUN 76* 28* 16   CREATININE 6.9* 3.9* 3.0*   GLUCOSE 70 82 96   CALCIUM 6.5* 9.0 7.7*   MG 1.9  --   --    PHOS 4.7  --   --        Hepatic:   No results for input(s): LABALBU, AST, ALT, ALB, BILITOT, ALKPHOS in the last 72 hours. Assessment and Plan:  Renal -acute kidney injury most likely secondary to severe volume depletion but cannot rule out ATN at this time. Monitor interdialytic creatinine, serological work-up is in process  Ultrasound negative, If no significant improvement in renal function then consider renal biopsy next week  Thought process reviewed with patient and family member in detail  No acute need for renal replacement therapy today  Reevaluate in a.m. Labs daily  Electrolytes -within normal limits  Acid-base status-improved  Hypocalcemia: Improved  CKD stage IV with baseline creatinine 1.8-2.0  Hx of fibromyalgia  Essential hypertension stable  Thrombocytopenia improving slowly  Anemia and renal insufficiency.   Continue with erythropoietin stimulating agents  Discussed with patient and family member in detail    Justice Farley MD  [facility]    This report has been created using voice recognition software.  It may contain minor errors which are inherent in voice recognition technology

## 2022-12-24 NOTE — PROGRESS NOTES
Hospitalist Progress Note      Patient: Jose Guadalupe Muller      Unit/Bed:6K-06/006-A    YOB: 1955    MRN: [id number]       Acct: [de-identified]     PCP: Emily Dixon MD    Date of Admission: 12/16/2022    Date/Time of Evaluation:  12/24/2022 at 9:06 AM    Assessment/Plan:    ERUM on CKD stage IV: apprec renal assist- ?etiology but suspect due to severe volume depletion per renal  Creat trending down to 6.3 on 12/20 but up again to 7.2 on 12/21 and 6.9 on 12/22 -->  overall down from 8.2 on 12/16 --> Baseline creatinine range ~ 1.8-2.0 in past year   Since not improving per renal 12/22 temp HD catheter and started HD 12/22 and another run 12/23  -> creat down to 3.0 on 12/24/2022 - IVF held 12/23  Per Dr. Reeder Aw re-eval next week if need to cont and may need renal bx --> cont hold ASA, plavix until next week per his rec for possible procedures (?tunneled tessio, ? renal bx)  IVF adjusted from bicarb gtt (12/16 - 12/19) and changed to NS 12/19 at 100 mL and creat cont worsen 12/21 --> HD started 12/22 per renal - IVF stopper 12/23  Renal u/s 12/20/22 with echogenic bilateral kidneys c/w chronic kidney disease  Serologic w/u (p) per renal   Avoid nephrotoxic agents, renally dose medications - s/p bumex 2 mg po x 1 on 12/18  Strict I&O's  ?renal bx  High AGMA, improving:   Likely due to ERUM/dehydration. On bicarb drip 12/16 - 12/19 as improved to WNL 12/19 and changed to NS 12/20 per renal and IVF held since 12/23  No signs of infxn -- Lactic acid 0.8 12/16 and 1.1 on 12/20  Nephrology following  Hemoptysis, resolved  Patient noted to have 3 episodes of hemoptysis with coughing. CT chest 12/17/22 with minimal atelectatic/fibrotic stranding left lung base and lingula.   Sputum culture ordered but unable to obtain  Pulmonology consulted, appreciate recs: \"likely combination of recent URI dry humidity and use of plavix and ASA, Pulmonary service will sign off no follow-up needed \"   Hypokalemia   Daily EfferK 40 mEq started 12/17 -> held since 12/20 for normal K - monitor alondra with ERUM  Daily lab  Hypocalcemia  Daily calcium replacement prn  Daily iCal - ?need PTH, vit D testing - will leave up to renal  Hypernatremia  Improved -- Secondary to #1, dehydration, nephrology following  Hypomagnesemia  Was on Po mag oxide starting 12/17 -> also given IV Mg 12/19 and then po Mg stopped 12/19 due to improved Mg  Cont monitor Mg  Hyperphosphatemia  Per renal no need for phosphorus binders at this time. D/t ERUM, should resolve as ERUM improves. Daily phosphorus level. Elevated troponin   0.033 on admission x 1 12/16 -> not repeated, EKG no acute ischemic changes. Patient denies chest pain - likely related to ERUM and electrolyte abn   Monitor for cardiac sx - no recent ischemic w/u and last echo 2/2022 with normal EF, no WMA  Abdominal pain, nausea, vomiting:   Patient reports N/V on admission - ?due to not being able to eat but more likely due to ERUM? ? Improving since 12/21  LFT 12/19/22 WNL  CT chest 12/17/22 with \"distended gallbladder likely related to not having eaten recently\" --> ??need GI imaging  Stool softners, as needed miralax   As needed antiemetics zofran, phenergan IM prn   URI - sx improved  CXR on admission negative for pulmonary infiltrates or effusions. Patient has been on doxycycline started on 12/12. No evidence of bacterial infection, thus no further antibiotics. Supportive care. Thrombocytopenia   Plts tended down from admission -- 131 on admission 12/17 -> down to 85 low on 12/21 and starting to trend up to 100 on 12/24/2022   ?etiology - ?plavix but Plts normal on admission and down - no signs of sepsis, ? DIC with renal failure but improving 12/23 - no other meds to contrib  Acute on chronic macrocytic anemia  Trending down during admission and Down to 6.7 on 12/21 am -> repeat 7.0  12/21 and down again 6.8 on 12/22 -> transfused 1 unit PRBC 12/22 as need for HD and hgb stable 8.7 on 12/24 on 12/24/2022    down from 10.3 on 12/16 --> Baseline hemoglobin range ~10-11.    ?some slight loss with hemoptysis and per pulm felt due to epistaxis -> has resolved at least since 12/21  ASA, Plavix held starting 12/21 per renal for possible procedures needed   Suspect likely dilutional component from IVF compounded by poor kidney fxn. FOBT (-) 12/19  Iron studies notable for ferritin 402, iron 145, iron sat 90, TIBC less than 162, folate normal, vitamin B12 greater than 2000  Trend and monitor CBC. Retacrit started 12/21 per renal  hold heparin for DVT proph 12/22 and resume once hgb stable  Chronic HFpEF, compensated:   Echo (2/15/2022) notable for EF 55 to 60%, no WMA, G1 DD, mild tricuspid regurg. Continue ASA, statin, Plavix, amlodipine. Daily weights, strict I&O's -- fluid mgmt per renal with ERUM and starting HD 12/22  Cardiac diet, 2G sodium  Essential hypertension, controlled:   Continue amlodipine 5 mg daily -> ?increase to 10 mg but monitor with HD started 12/22  Cont prn hydralazine  Monitor and adjust prn  Hyperlipidemia:   Cont home Statin  Depression/anxiety:   Continue Wellbutrin, trazodone  ?on librax at home  History of CVA  No new sx  Continued on aspirin, Plavix, statin -> ASA, plavix held starting 12/21 for possible renal procedures  Mild MR, mild TR -- per echo 2/2022  Generalized weakness  Pt/OT following - monitor progress     Dispo  -- 12/24/2022 -> apprec renal assist -- lucille HD ok yesterday and feeling the best she has since admission -  agrees;  per renal plan to cont monitor over weekend and determine next week if need tunneled HD catheter and ?renal bx -- Cont monitor lytes, renal fxn, u/o, BP, h/h, plts, and cont to increase activity with PT/OT.           Chief Complaint: Dehydration     Hospital Course:   Per CNP Cori Degroot note 12/20 \"Per HPI documented 12/16/2022: Imelda Kumar Marice Osgood is a 69 y.o. female with PMHx of CVA, depression, who presented to [facility] with chief complaint of dehydration, N/V. Patient states that she was seen by her PCP on Monday, 12/12 for congestion, sore throat, fatigue, weakness on going for >2 weeks. She was started on PO doxycycline. She continues to feel congested but has no sinus pain or pressure, no cough, afebrile. Approx 2 days after starting doxy, patient developed N/V. Reports very poor PO intake, states she has only been eating ice chips. Reports lower abdominal pain, last BM 3 days ago. Reports decreased urine output, states it is very dark. Denies dysuria, urgency, frequency. No chest pain, SOB, f/c. Pt follows with Dr. Marcellus Adame for CKD. Admitted to med/tele for further management. \"     12/17/22: Resumed care of patient today. Patient afebrile, satting 94% on room air, with hemodynamically stable vital signs. Patient stating she feels weak and tired this morning. States that she still feels nauseous and when she ate some breakfast this morning she started dry heaving and almost vomited. States that she has very little appetite. Also reported some intermittent abdominal cramping. States she is passing gas. States her last bowel movement was Tuesday. Stool softeners added. Multiple electrolyte derangements as noted above. Creatinine 7.3 today, down from 8.2. Discussed case with Dr. Stiles nephrology, who recommend decreasing bicarb drip to rate of 100 mL/hr. Patient making urine 900 mL OP overnight. 12/18/22: Afebrile, hemodynamically stable. No acute events overnight. Patient stating she feels a little better this morning but still feels weak. States that she has not had any nausea or vomiting since yesterday. States she had 2 medium solid BMs and no longer has abdominal pain. States she ate all her breakfast.  Creatinine at 7.3 today, no improvement. Ionized calcium still remains low, will continue to replete. Potassium magnesium still low, will continue to replete.   Continue IV bicarb drip.     12/19/22: Afebrile, satting 97% on room air, hemodynamically stable. Patient states she is feeling better. Reports no more hemoptysis. States that she still having some nausea and I eating much of her meals. Denies vomiting and abdominal pains. No other complaints. Creatinine 7.1 today. Slow improvement. Continue to correct electrolyte derangements as noted above. Continue IV bicarb drip     12/20/22: Hemodynamically stable. No acute events. Creatinine downtrending, 6.6 today. H&H fluctuating but stable. Phosphorus coming down. Ionized calcium still low, will replace again today. Potassium and magnesium okay will withhold replacement today. Patient stated that she did have acute episode of nausea followed by vomiting earlier this morning after eating her breakfast.  Currently states that she does not feel nauseous and has no complaints at this time. Continue IV fluids. \"    12/21: n/v improving, creat up to 7.2 again -> IVF adjusted  12/19 per renal as CO2 improving but renal fxn not improving - holding ASA, plavix in prep for possible bx, HD catheter. 12/22 -> creat still 6.9 -> per renal temp HD catheter placed and started on HD  12/23 -> HD ran again per renal - creat down to 3.9     Subjective/HPI:   -- 12/24/2022  --> pt states HD went well yesterday - states best she has felt since admission -  agrees. Denies any cp, sob. States abd pain and nausea improved - able eat a little but not much. No lightheaded/dizziness. No accurate I/O recorded overnight - removed with HD. Afeb, on RA. Last BM 12/23 x 2      PMH, SURGICAL HX, FH, SOCIAL HX reviewed and updated as needed.     Medications:  Reviewed    Infusion Medications    sodium chloride      [Held by provider] sodium chloride Stopped (12/23/22 1525)    sodium chloride       Scheduled Medications    epoetin mumtaz-epbx  2,000 Units SubCUTAneous Once per day on Mon Wed Fri    And    epoetin mumtaz-epbx  3,000 Units SubCUTAneous Once per day on Mon Wed Fri    [Held by provider] magnesium oxide  400 mg Oral BID    calcium replacement protocol   Other RX Placeholder    calcitRIOL  0.25 mcg Oral Once per day on Sun Tue Thu Sat    docusate sodium  100 mg Oral Daily    senna  1 tablet Oral Nightly    [Held by provider] potassium bicarb-citric acid  40 mEq Oral Daily    amLODIPine  5 mg Oral Daily    [Held by provider] aspirin  81 mg Oral Daily    buPROPion  200 mg Oral BID    [Held by provider] clopidogrel  75 mg Oral Daily    folic acid  089 mcg Oral Daily    montelukast  10 mg Oral Daily    atorvastatin  10 mg Oral Daily    traZODone  50 mg Oral Nightly    polyethylene glycol  17 g Oral Daily    sodium chloride flush  10 mL IntraVENous 2 times per day    [Held by provider] heparin (porcine)  5,000 Units SubCUTAneous 3 times per day     PRN Meds: sodium chloride, promethazine, chlordiazePOXIDE-clidinium, fluticasone, simethicone, sodium chloride flush, sodium chloride, ondansetron **OR** ondansetron, acetaminophen **OR** acetaminophen, dextromethorphan-guaiFENesin, cetirizine, hydrALAZINE      Intake/Output Summary (Last 24 hours) at 12/24/2022 1559  Last data filed at 12/24/2022 0760  Gross per 24 hour   Intake 575 ml   Output 2100 ml   Net -1525 ml       Diet:  ADULT ORAL NUTRITION SUPPLEMENT; Breakfast, Dinner; Standard 4 oz Oral Supplement  ADULT DIET; Regular; 4 carb choices (60 gm/meal); Low Fat/Low Chol/High Fiber/2 gm Na; Less than 60 gm    Exam:  BP (!) 143/73 Comment: 143/73  Pulse 90   Temp 97.7 °F (36.5 °C) (Oral)   Resp 18   Ht 5' (1.524 m)   Wt 114 lb 9.6 oz (52 kg)   SpO2 96%   BMI 22.38 kg/m²     General appearance: No apparent distress, appears older than stated age and cooperative. Oriented x 3. HEENT: Pupils equal, round, and reactive to light. Conjunctivae/corneas clear. MM dry with some cracking on lips - no vesicles but some dried scabs on upper vermillion border and left lower lip  Neck: Supple, with full range of motion. Trachea midline. Respiratory:  Normal respiratory effort. Diminished, few rales in left bases, no wheezing, no rhonchi. No respiratory distress or accessory muscle use. Cardiovascular: Regular rate and rhythm with normal S1/S2, 2/6 DAVONTE LUSB,  No JVD. Abdomen: Soft, slight diffuse but alondra MID upper  TTP today, non-distended with normal bowel sounds. No rebound or guarding  Musculoskeletal: No clubbing, cyanosis or edema bilaterally. Full range of motion without deformity. No calf tenderness palpation  Skin: Skin color, texture, turgor normal.  No rashes or lesions. Neurologic:  Neurovascularly intact without any focal sensory/motor deficits. Cranial nerves: II-XII intact, grossly non-focal.  Psychiatric: Alert and oriented, flat affect, thought content appropriate, normal insight  Capillary Refill: Brisk,< 3 seconds   Peripheral Pulses: +2 palpable, equal bilaterally       All labs reviewed and interpreted by me:  Labs:   Recent Labs     12/22/22  0547 12/22/22  1545 12/23/22  0506 12/24/22  0548   WBC 8.1  --  9.0 7.7   HGB 6.8* 9.5* 9.0* 8.7*   HCT 21.1* 28.8* 28.3* 27.3*   PLT 89*  --  100* 109*     Recent Labs     12/22/22  0547 12/23/22  0506 12/24/22  0548    140 141   K 4.0 4.4 4.3    105 108   CO2 22* 24 24   BUN 76* 28* 16   CREATININE 6.9* 3.9* 3.0*   CALCIUM 6.5* 9.0 7.7*   PHOS 4.7  --   --      No results for input(s): AST, ALT, BILIDIR, BILITOT, ALKPHOS in the last 72 hours. No results for input(s): INR in the last 72 hours. No results for input(s): Rhae Childes in the last 72 hours.     Urinalysis:      Lab Results   Component Value Date/Time    NITRU NEGATIVE 12/16/2022 05:20 PM    WBCUA 10-15 12/16/2022 05:20 PM    BACTERIA NONE SEEN 12/16/2022 05:20 PM    RBCUA 5-10 12/16/2022 05:20 PM    BLOODU TRACE 12/16/2022 05:20 PM    SPECGRAV 1.010 12/16/2022 05:20 PM    GLUCOSEU Negative 07/16/2021 04:09 PM    GLUCOSEU NEGATIVE 06/09/2020 03:03 PM       All radiology images and reports reviewed and interpreted by me:  Radiology:  IR FLUORO GUIDED CVA DEVICE PLMT/REPLACE/REMOVAL   Final Result   Status post successful nontunneled dialysis catheter insertion. **This report has been created using voice recognition software. It may contain minor errors which are inherent in voice recognition technology. **      Final report electronically signed by Dr Milton Cruz on 12/22/2022 1:41 PM      US RENAL COMPLETE   Final Result   Impression:   Echogenic bilateral kidneys, this represents chronic kidney disease. This document has been electronically signed by: Meghan Curtis MD on    12/20/2022 08:27 PM      CT CHEST WO CONTRAST   Final Result   Minimal atelectatic/fibrotic stranding left lung base and lingula. Small hiatus hernia. Distended gallbladder, likely related to not having eaten recently. **This report has been created using voice recognition software. It may contain minor errors which are inherent in voice recognition technology. **      Final report electronically signed by Dr. Kelly Valdes on 12/17/2022 5:56 PM      XR CHEST (2 VW)   Final Result   1. No interval change since previous study dated 9/18/2021. Maciel Winston **This report has been created using voice recognition software. It may contain minor errors which are inherent in voice recognition technology. **      Final report electronically signed by DR Galdino Stephenson on 12/16/2022 11:36 AM          Diet: ADULT ORAL NUTRITION SUPPLEMENT; Breakfast, Dinner; Standard 4 oz Oral Supplement  ADULT DIET; Regular; 4 carb choices (60 gm/meal); Low Fat/Low Chol/High Fiber/2 gm Na;  Less than 60 gm    Smith: no    Microbiology:  throat cx 12/16 (-)    Covid 12/16 (-), influenza 12/16 (-)    Urine cx 12/17 = contaminants    Tele review last 24 hrs:  SR, no arrhythmias    DVT prophylaxis: [] Lovenox                                 [] SCDs                                 [x] SQ Heparin                                 [] Encourage ambulation           [] Already on Anticoagulation     Disposition:    [x] Home with ? [facility]       [] TCU       [] Rehab       [] Psych       [] SNF       [] [place]       [] Other-    Code Status: Full Code    PT/OT Eval Status: following      Electronically signed by Javy Palomo MD on 12/24/2022 at 9:06 AM

## 2022-12-24 NOTE — FLOWSHEET NOTE
12/24/22 0800   Treatment Team Notification   Reason for Communication Critical results  (This patient's Hgb is 6.8 this morning. Also, her Ionized calcium was 0.82. Usually we cant replace the electrolytes if their Cr Cl is <30, hers is 6. Do I replace the Calcium?  And do you want to order blood?)   Type of Critical Result Laboratory   Critical Lab Result Type Creatinine   Team Member Name Dr. Dimitry Warner Attending Provider   Method of Communication Secure Message   Notification Time 1017        Informed Dr Mickie Carrera about the latest lab result, no new orders made

## 2022-12-24 NOTE — PLAN OF CARE
Problem: Discharge Planning  Goal: Discharge to home or other facility with appropriate resources  12/23/2022 2302 by Colonel Yvette RN  Outcome: Progressing  Flowsheets (Taken 12/23/2022 2002)  Discharge to home or other facility with appropriate resources:   Identify barriers to discharge with patient and caregiver   Identify discharge learning needs (meds, wound care, etc)     Problem: Pain  Goal: Verbalizes/displays adequate comfort level or baseline comfort level  12/23/2022 2302 by Colonel Yvette RN  Outcome: Progressing  Flowsheets (Taken 12/23/2022 2002)  Verbalizes/displays adequate comfort level or baseline comfort level:   Encourage patient to monitor pain and request assistance   Assess pain using appropriate pain scale   Administer analgesics based on type and severity of pain and evaluate response   Implement non-pharmacological measures as appropriate and evaluate response   Consider cultural and social influences on pain and pain management     Problem: Skin/Tissue Integrity  Goal: Absence of new skin breakdown  Description: 1. Monitor for areas of redness and/or skin breakdown  2. Assess vascular access sites hourly  3. Every 4-6 hours minimum:  Change oxygen saturation probe site  4. Every 4-6 hours:  If on nasal continuous positive airway pressure, respiratory therapy assess nares and determine need for appliance change or resting period.   12/23/2022 2302 by Colonel Yvette RN  Outcome: Progressing     Problem: Safety - Adult  Goal: Free from fall injury  12/23/2022 2302 by Colonel Yvette RN  Outcome: Progressing  Flowsheets (Taken 12/23/2022 2302)  Free From Fall Injury: Instruct family/caregiver on patient safety     Problem: ABCDS Injury Assessment  Goal: Absence of physical injury  12/23/2022 2302 by Colonel Yvette RN  Outcome: Progressing  Flowsheets (Taken 12/23/2022 2302)  Absence of Physical Injury: Implement safety measures based on patient assessment     Problem: Genitourinary - Adult  Goal: Absence of urinary retention  12/23/2022 2302 by Alyssa Gatica RN  Outcome: Progressing  Flowsheets (Taken 12/23/2022 2002)  Absence of urinary retention: Assess patients ability to void and empty bladder     Problem: Metabolic/Fluid and Electrolytes - Adult  Goal: Electrolytes maintained within normal limits  12/23/2022 2302 by Alyssa Gatica RN  Outcome: Progressing  Flowsheets (Taken 12/23/2022 2002)  Electrolytes maintained within normal limits:   Monitor labs and assess patient for signs and symptoms of electrolyte imbalances   Administer electrolyte replacement as ordered   Monitor response to electrolyte replacements, including repeat lab results as appropriate     Problem: Metabolic/Fluid and Electrolytes - Adult  Goal: Hemodynamic stability and optimal renal function maintained  12/23/2022 2302 by Alyssa Gatica RN  Outcome: Progressing  Flowsheets (Taken 12/23/2022 2002)  Hemodynamic stability and optimal renal function maintained:   Monitor labs and assess for signs and symptoms of volume excess or deficit   Monitor intake, output and patient weight   Encourage oral intake as appropriate   Monitor response to interventions for patient's volume status, including labs, urine output, blood pressure (other measures as available)     Problem: Chronic Conditions and Co-morbidities  Goal: Patient's chronic conditions and co-morbidity symptoms are monitored and maintained or improved  12/23/2022 2302 by Alyssa Gatica RN  Outcome: Progressing  Flowsheets (Taken 12/23/2022 2002)  Care Plan - Patient's Chronic Conditions and Co-Morbidity Symptoms are Monitored and Maintained or Improved:   Monitor and assess patient's chronic conditions and comorbid symptoms for stability, deterioration, or improvement   Collaborate with multidisciplinary team to address chronic and comorbid conditions and prevent exacerbation or deterioration     Problem: Nutrition Deficit:  Goal: Optimize nutritional status  12/23/2022 2302 by Jori Garcia RN  Outcome: Progressing  Flowsheets (Taken 12/23/2022 2302)  Nutrient intake appropriate for improving, restoring, or maintaining nutritional needs: Monitor oral intake, labs, and treatment plans     Care plan reviewed with patient. Patient verbalized understanding of the plan of care and contributed to goal setting.

## 2022-12-25 LAB
ANION GAP SERPL CALCULATED.3IONS-SCNC: 11 MEQ/L (ref 8–16)
BASOPHILS # BLD: 0.7 %
BASOPHILS ABSOLUTE: 0.1 THOU/MM3 (ref 0–0.1)
BUN BLDV-MCNC: 23 MG/DL (ref 7–22)
CALCIUM SERPL-MCNC: 7.5 MG/DL (ref 8.5–10.5)
CHLORIDE BLD-SCNC: 105 MEQ/L (ref 98–111)
CO2: 23 MEQ/L (ref 23–33)
CREAT SERPL-MCNC: 4.5 MG/DL (ref 0.4–1.2)
EOSINOPHIL # BLD: 1 %
EOSINOPHILS ABSOLUTE: 0.1 THOU/MM3 (ref 0–0.4)
ERYTHROCYTE [DISTWIDTH] IN BLOOD BY AUTOMATED COUNT: 17.2 % (ref 11.5–14.5)
ERYTHROCYTE [DISTWIDTH] IN BLOOD BY AUTOMATED COUNT: 59.4 FL (ref 35–45)
GFR SERPL CREATININE-BSD FRML MDRD: 10 ML/MIN/1.73M2
GLUCOSE BLD-MCNC: 88 MG/DL (ref 70–108)
HCT VFR BLD CALC: 26.3 % (ref 37–47)
HEMOGLOBIN: 8.4 GM/DL (ref 12–16)
IMMATURE GRANS (ABS): 0.2 THOU/MM3 (ref 0–0.07)
IMMATURE GRANULOCYTES: 2 %
LYMPHOCYTES # BLD: 16.6 %
LYMPHOCYTES ABSOLUTE: 1.6 THOU/MM3 (ref 1–4.8)
MCH RBC QN AUTO: 29.7 PG (ref 26–33)
MCHC RBC AUTO-ENTMCNC: 31.9 GM/DL (ref 32.2–35.5)
MCV RBC AUTO: 92.9 FL (ref 81–99)
MONOCYTES # BLD: 9.9 %
MONOCYTES ABSOLUTE: 1 THOU/MM3 (ref 0.4–1.3)
NUCLEATED RED BLOOD CELLS: 0 /100 WBC
PLATELET # BLD: 144 THOU/MM3 (ref 130–400)
PMV BLD AUTO: 11.9 FL (ref 9.4–12.4)
POTASSIUM REFLEX MAGNESIUM: 4.2 MEQ/L (ref 3.5–5.2)
RBC # BLD: 2.83 MILL/MM3 (ref 4.2–5.4)
SEG NEUTROPHILS: 69.8 %
SEGMENTED NEUTROPHILS ABSOLUTE COUNT: 6.9 THOU/MM3 (ref 1.8–7.7)
SODIUM BLD-SCNC: 139 MEQ/L (ref 135–145)
WBC # BLD: 9.9 THOU/MM3 (ref 4.8–10.8)

## 2022-12-25 PROCEDURE — 6370000000 HC RX 637 (ALT 250 FOR IP)

## 2022-12-25 PROCEDURE — 6370000000 HC RX 637 (ALT 250 FOR IP): Performed by: INTERNAL MEDICINE

## 2022-12-25 PROCEDURE — 36415 COLL VENOUS BLD VENIPUNCTURE: CPT

## 2022-12-25 PROCEDURE — 85025 COMPLETE CBC W/AUTO DIFF WBC: CPT

## 2022-12-25 PROCEDURE — 80048 BASIC METABOLIC PNL TOTAL CA: CPT

## 2022-12-25 PROCEDURE — 6360000002 HC RX W HCPCS: Performed by: PHYSICIAN ASSISTANT

## 2022-12-25 PROCEDURE — 1200000003 HC TELEMETRY R&B

## 2022-12-25 PROCEDURE — 6370000000 HC RX 637 (ALT 250 FOR IP): Performed by: PHYSICIAN ASSISTANT

## 2022-12-25 PROCEDURE — 2580000003 HC RX 258: Performed by: PHYSICIAN ASSISTANT

## 2022-12-25 RX ADMIN — CALCITRIOL CAPSULES 0.25 MCG 0.25 MCG: 0.25 CAPSULE ORAL at 08:45

## 2022-12-25 RX ADMIN — ATORVASTATIN CALCIUM 10 MG: 10 TABLET, FILM COATED ORAL at 08:45

## 2022-12-25 RX ADMIN — BUPROPION HYDROCHLORIDE 200 MG: 100 TABLET, FILM COATED, EXTENDED RELEASE ORAL at 21:01

## 2022-12-25 RX ADMIN — GUAIFENESIN AND DEXTROMETHORPHAN HYDROBROMIDE 1 TABLET: 600; 30 TABLET, EXTENDED RELEASE ORAL at 08:43

## 2022-12-25 RX ADMIN — FOLIC ACID 500 MCG: 1 TABLET ORAL at 08:45

## 2022-12-25 RX ADMIN — DOCUSATE SODIUM 100 MG: 100 CAPSULE, LIQUID FILLED ORAL at 08:43

## 2022-12-25 RX ADMIN — POLYETHYLENE GLYCOL 3350 17 G: 17 POWDER, FOR SOLUTION ORAL at 08:43

## 2022-12-25 RX ADMIN — SENNOSIDES 8.6 MG: 8.6 TABLET, COATED ORAL at 21:01

## 2022-12-25 RX ADMIN — AMLODIPINE BESYLATE 5 MG: 5 TABLET ORAL at 08:45

## 2022-12-25 RX ADMIN — ONDANSETRON 4 MG: 4 TABLET, ORALLY DISINTEGRATING ORAL at 18:56

## 2022-12-25 RX ADMIN — SODIUM CHLORIDE, PRESERVATIVE FREE 10 ML: 5 INJECTION INTRAVENOUS at 08:46

## 2022-12-25 RX ADMIN — TRAZODONE HYDROCHLORIDE 50 MG: 50 TABLET ORAL at 21:01

## 2022-12-25 RX ADMIN — MONTELUKAST SODIUM 10 MG: 10 TABLET ORAL at 21:01

## 2022-12-25 RX ADMIN — BUPROPION HYDROCHLORIDE 200 MG: 100 TABLET, FILM COATED, EXTENDED RELEASE ORAL at 08:45

## 2022-12-25 ASSESSMENT — PAIN SCALES - GENERAL: PAINLEVEL_OUTOF10: 0

## 2022-12-25 NOTE — FLOWSHEET NOTE
Virtual RN rounds completed. Family at bedside. Complaints of nausea. Message sent to bedside, otherwise no needs. Call light in reach.

## 2022-12-25 NOTE — PLAN OF CARE
Problem: Discharge Planning  Goal: Discharge to home or other facility with appropriate resources  Outcome: Progressing  Flowsheets (Taken 12/25/2022 1457)  Discharge to home or other facility with appropriate resources: Identify barriers to discharge with patient and caregiver  Note: Patient preference to discharge home with        Problem: Skin/Tissue Integrity  Goal: Absence of new skin breakdown  Description: 1. Monitor for areas of redness and/or skin breakdown  2. Assess vascular access sites hourly  3. Every 4-6 hours minimum:  Change oxygen saturation probe site  4. Every 4-6 hours:  If on nasal continuous positive airway pressure, respiratory therapy assess nares and determine need for appliance change or resting period. Outcome: Progressing  Note: No new signs or symptoms of skin breakdown noted this shift, encouraging patient to turn and reposition self in bed q2h       Problem: Safety - Adult  Goal: Free from fall injury  Outcome: Progressing  Flowsheets (Taken 12/25/2022 1457)  Free From Fall Injury: Instruct family/caregiver on patient safety  Note: No falls noted this shift. Continue falling star program. Bed alarm on, bed in low position. Call light and personal belongings in reach. Patient uses call light appropriately.        Problem: Metabolic/Fluid and Electrolytes - Adult  Goal: Electrolytes maintained within normal limits  Outcome: Progressing  Flowsheets (Taken 12/25/2022 1457)  Electrolytes maintained within normal limits: Monitor labs and assess patient for signs and symptoms of electrolyte imbalances  Note: Patient new Hemodialysis     Problem: Chronic Conditions and Co-morbidities  Goal: Patient's chronic conditions and co-morbidity symptoms are monitored and maintained or improved  Outcome: Progressing  Flowsheets (Taken 12/25/2022 1457)  Care Plan - Patient's Chronic Conditions and Co-Morbidity Symptoms are Monitored and Maintained or Improved: Monitor and assess patient's chronic conditions and comorbid symptoms for stability, deterioration, or improvement     Problem: Nutrition Deficit:  Goal: Optimize nutritional status  Outcome: Progressing  Flowsheets (Taken 12/25/2022 0258)  Nutrient intake appropriate for improving, restoring, or maintaining nutritional needs:   Monitor oral intake, labs, and treatment plans   Recommend appropriate diets, oral nutritional supplements, and vitamin/mineral supplements     Care plan reviewed with patient and . Patient and    verbalize understanding of the plan of care and contribute to goal setting.

## 2022-12-25 NOTE — FLOWSHEET NOTE
Virtual RN rounds completed. Patient answered when virtual nurse called in and introduction made. When asked if camera could be turned on, patient did not answer 3 times. Turned camera on for safety during this round. Patient up in chair, call light in reach.

## 2022-12-25 NOTE — PROGRESS NOTES
Hospitalist Progress Note      Patient: Myron Vazquez      Unit/Bed:6-06/006-A    YOB: 1955    MRN: [id number]       Acct: [de-identified]     PCP: Iman Garza MD    Date of Admission: 12/16/2022    Date/Time of Evaluation:  12/25/2022 at 7:45 AM    Assessment/Plan:    ERUM on CKD stage IV: apprec renal assist- ?etiology but suspect due to severe volume depletion per renal  Creat trending down to 6.3 on 12/20 but up again to 7.2 on 12/21 and 6.9 on 12/22 -->  overall down from 8.2 on 12/16 --> Baseline creatinine range ~ 1.8-2.0 in past year   Since not improving per renal 12/22 temp HD catheter and started HD 12/22 and another run 12/23  -> creat down to 3.0 on12/24 but going back up to 4.5 on 12/25/2022 - IVF held 12/23 per renal  Per Dr. Gill Draft re-eval next week if need to cont and may need renal bx --> cont hold ASA, plavix until next week per his rec for possible procedures (?tunneled tessio, ? renal bx)  IVF adjusted from bicarb gtt (12/16 - 12/19) and changed to NS 12/19 at 100 mL and creat cont worsen 12/21 --> HD started 12/22 per renal - IVF stopped 12/23  Renal u/s 12/20/22 with echogenic bilateral kidneys c/w chronic kidney disease  Serologic w/u 12/19 -> C3 little low, C4 normal , Antinuclear Ab Hep-2, IGG (-), myeloperoxidase (-), serine protease IgG (-), elevated kappa/lambda light chains, immunofixation normal, GBM Ab (-), ANCA (-), LUCINA (+)-> reflex (p)  Avoid nephrotoxic agents, renally dose medications - s/p bumex 2 mg po x 1 on 12/18  Strict I&O's  ?renal bx  High AGMA, improving:   Likely due to ERUM/dehydration. On bicarb drip 12/16 - 12/19 as improved to WNL 12/19 and changed to NS 12/20 per renal and IVF held since 12/23  Improving since HD also started 12/22  No signs of infxn -- Lactic acid 0.8 12/16 and 1.1 on 12/20  Nephrology following  Hemoptysis, resolved  Patient noted to have 3 episodes of hemoptysis with coughing.    CT chest 12/17/22 with minimal atelectatic/fibrotic stranding left lung base and lingula.  Sputum culture ordered but unable to obtain  Pulmonology consulted, appreciate recs: \"likely combination of recent URI dry humidity and use of plavix and ASA, Pulmonary service will sign off no follow-up needed \"   Hypokalemia   Daily EfferK 40 mEq started 12/17 -> held since 12/20 for normal K - monitor alondra with ERUM  Daily lab  Hypocalcemia  Daily calcium replacement prn  Persistently low - ?add tums- ?need PTH, vit D testing - will leave up to renal  Hypernatremia  Improved -- Secondary to #1, dehydration, nephrology following  Hypomagnesemia  Was on Po mag oxide starting 12/17 -> also given IV Mg 12/19 and then po Mg stopped 12/19 due to improved Mg  Cont monitor Mg prn  Hyperphosphatemia  Per renal no need for phosphorus binders at this time. D/t ERUM, should resolve as ERUM improves.   Monitor prn as had improved to WNL on 12/22   Elevated troponin   0.033 on admission x 1 12/16 -> not repeated, EKG no acute ischemic changes.  Patient denies chest pain - likely related to ERUM and electrolyte abn   Monitor for cardiac sx - no recent ischemic w/u and last echo 2/2022 with normal EF, no WMA  Abdominal pain, nausea, vomiting:   Patient reports N/V on admission - ?due to not being able to eat but more likely due to ERUM??  Improving since 12/21  LFT 12/19/22 WNL  CT chest 12/17/22 with \"distended gallbladder likely related to not having eaten recently\" --> ??need GI imaging  Stool softners, as needed miralax   As needed antiemetics zofran, phenergan IM prn   URI - sx improved  CXR on admission negative for pulmonary infiltrates or effusions. Patient has been on doxycycline started on 12/12.  No evidence of bacterial infection, thus no further antibiotics.    Supportive care.  Thrombocytopenia   Plts tended down from admission -- 131 on admission 12/17 -> down to 85 low on 12/21 and starting to trend up to normal 144 on 12/25/2022   ?etiology - ?plavix but Plts  normal on admission and down - no signs of sepsis, ? DIC with renal failure but improving 12/23 - no other meds to contrib  Acute on chronic macrocytic anemia  Trending down during admission and Down to 6.7 on 12/21 am -> repeat 7.0  12/21 and down again 6.8 on 12/22 -> transfused 1 unit PRBC 12/22 as need for HD and hgb stable 8.4 on 12/25/2022    down from 10.3 on 12/16 --> Baseline hemoglobin range ~10-11.    ?some slight loss with hemoptysis and per pulm felt due to epistaxis -> has resolved at least since 12/21  ASA, Plavix held starting 12/21 per renal for possible procedures needed   Suspect likely dilutional component from IVF compounded by poor kidney fxn. FOBT (-) 12/19  Iron studies notable for ferritin 402, iron 145, iron sat 90, TIBC less than 162, folate normal, vitamin B12 greater than 2000  Trend and monitor CBC. Retacrit started 12/21 per renal  hold heparin for DVT proph 12/22 and resume once hgb stable  Chronic HFpEF, compensated:   Echo (2/15/2022) notable for EF 55 to 60%, no WMA, G1 DD, mild tricuspid regurg. Continue ASA, statin, Plavix, amlodipine. Daily weights, strict I&O's -- fluid mgmt per renal with ERUM and starting HD 12/22  Cardiac diet, 2G sodium  Essential hypertension, controlled:   Continue amlodipine 5 mg daily -> ?increase to 10 mg but monitor with HD started 12/22  Cont prn hydralazine  Monitor and adjust prn  (+) LUCINA   (+) on 12/19 -> reflex (p) - ?need further testing  Hyperlipidemia:   Cont home Statin  Depression/anxiety:   Continue Wellbutrin, trazodone  ?on librax at home  History of CVA  No new sx  Continued on aspirin, Plavix, statin -> ASA, plavix held starting 12/21 for possible renal procedures and monitor neuro status  Mild MR, mild TR -- per echo 2/2022  Generalized weakness  Pt/OT following - monitor progress     Dispo  -- 12/25/2022 -> apprec renal assist -- creat up today -> ? HD tomorrow - K stable.   Await further renal plan - ?need tunneled catheter and renal bx this week? Cont monitor lytes, renal fxn, u/o, BP, h/h, plts, and cont to increase activity with PT/OT. Chief Complaint: Dehydration     Hospital Course:   Per CNP Cheryl Alberto note 12/20 \"Per HPI documented 12/16/2022: Abraham Hernandez is a 69 y.o. female with PMHx of CVA, depression, who presented to [facility] with chief complaint of dehydration, N/V. Patient states that she was seen by her PCP on Monday, 12/12 for congestion, sore throat, fatigue, weakness on going for >2 weeks. She was started on PO doxycycline. She continues to feel congested but has no sinus pain or pressure, no cough, afebrile. Approx 2 days after starting doxy, patient developed N/V. Reports very poor PO intake, states she has only been eating ice chips. Reports lower abdominal pain, last BM 3 days ago. Reports decreased urine output, states it is very dark. Denies dysuria, urgency, frequency. No chest pain, SOB, f/c. Pt follows with Dr. Lilli Martin for CKD. Admitted to med/tele for further management. \"     12/17/22: Resumed care of patient today. Patient afebrile, satting 94% on room air, with hemodynamically stable vital signs. Patient stating she feels weak and tired this morning. States that she still feels nauseous and when she ate some breakfast this morning she started dry heaving and almost vomited. States that she has very little appetite. Also reported some intermittent abdominal cramping. States she is passing gas. States her last bowel movement was Tuesday. Stool softeners added. Multiple electrolyte derangements as noted above. Creatinine 7.3 today, down from 8.2. Discussed case with Dr. Donavon Kawasaki nephrology, who recommend decreasing bicarb drip to rate of 100 mL/hr. Patient making urine 900 mL OP overnight. 12/18/22: Afebrile, hemodynamically stable. No acute events overnight. Patient stating she feels a little better this morning but still feels weak.   States that she has not had any nausea or vomiting since yesterday. States she had 2 medium solid BMs and no longer has abdominal pain. States she ate all her breakfast.  Creatinine at 7.3 today, no improvement. Ionized calcium still remains low, will continue to replete. Potassium magnesium still low, will continue to replete. Continue IV bicarb drip.     12/19/22: Afebrile, satting 97% on room air, hemodynamically stable. Patient states she is feeling better. Reports no more hemoptysis. States that she still having some nausea and I eating much of her meals. Denies vomiting and abdominal pains. No other complaints. Creatinine 7.1 today. Slow improvement. Continue to correct electrolyte derangements as noted above. Continue IV bicarb drip     12/20/22: Hemodynamically stable. No acute events. Creatinine downtrending, 6.6 today. H&H fluctuating but stable. Phosphorus coming down. Ionized calcium still low, will replace again today. Potassium and magnesium okay will withhold replacement today. Patient stated that she did have acute episode of nausea followed by vomiting earlier this morning after eating her breakfast.  Currently states that she does not feel nauseous and has no complaints at this time. Continue IV fluids. \"    12/21: n/v improving, creat up to 7.2 again -> IVF adjusted  12/19 per renal as CO2 improving but renal fxn not improving - holding ASA, plavix in prep for possible bx, HD catheter. 12/22 -> creat still 6.9 -> per renal temp HD catheter placed and started on HD  12/23 -> HD ran again per renal - creat down to 3.9   12/24 -> creat down to 3.0  12/25 -> creat up to 4.5 with last HD 12/23    Subjective/HPI:   -- 12/25/2022  --> pt states she is doing ok this am - getting ready to eat lunch. No abd pain or nausea this am.  Denies any cp, sob. On RA. Afebrile. No lightheaded/dizziness. No accurate I/O but with at least 1450 + u/o last 24 hrs.   Last BM 12/23 x 2      PMH, SURGICAL HX, FH, SOCIAL HX reviewed and updated as needed. Medications:  Reviewed    Infusion Medications    sodium chloride      [Held by provider] sodium chloride Stopped (12/23/22 6715)    sodium chloride       Scheduled Medications    epoetin mumtaz-epbx  2,000 Units SubCUTAneous Once per day on Mon Wed Fri    And    epoetin mumtaz-epbx  3,000 Units SubCUTAneous Once per day on Mon Wed Fri    [Held by provider] magnesium oxide  400 mg Oral BID    calcium replacement protocol   Other RX Placeholder    calcitRIOL  0.25 mcg Oral Once per day on Sun Tue Thu Sat    docusate sodium  100 mg Oral Daily    senna  1 tablet Oral Nightly    [Held by provider] potassium bicarb-citric acid  40 mEq Oral Daily    amLODIPine  5 mg Oral Daily    [Held by provider] aspirin  81 mg Oral Daily    buPROPion  200 mg Oral BID    [Held by provider] clopidogrel  75 mg Oral Daily    folic acid  822 mcg Oral Daily    montelukast  10 mg Oral Daily    atorvastatin  10 mg Oral Daily    traZODone  50 mg Oral Nightly    polyethylene glycol  17 g Oral Daily    sodium chloride flush  10 mL IntraVENous 2 times per day    [Held by provider] heparin (porcine)  5,000 Units SubCUTAneous 3 times per day     PRN Meds: sodium chloride, promethazine, chlordiazePOXIDE-clidinium, fluticasone, simethicone, sodium chloride flush, sodium chloride, ondansetron **OR** ondansetron, acetaminophen **OR** acetaminophen, dextromethorphan-guaiFENesin, cetirizine, hydrALAZINE      Intake/Output Summary (Last 24 hours) at 12/25/2022 0796  Last data filed at 12/25/2022 0417  Gross per 24 hour   Intake 120 ml   Output 1450 ml   Net -1330 ml       Diet:  ADULT ORAL NUTRITION SUPPLEMENT; Breakfast, Dinner; Standard 4 oz Oral Supplement  ADULT DIET; Regular; 4 carb choices (60 gm/meal); Low Fat/Low Chol/High Fiber/2 gm Na;  Less than 60 gm    Exam:  BP (!) 176/77   Pulse 69   Temp 98.5 °F (36.9 °C) (Oral)   Resp 18   Ht 5' (1.524 m)   Wt 114 lb 9.6 oz (52 kg)   SpO2 90%   BMI 22.38 kg/m²     General appearance: No apparent distress, appears older than stated age and cooperative. Oriented x 3. HEENT: Pupils equal, round, and reactive to light. Conjunctivae/corneas clear. MM dry with some cracking on lips - no vesicles but some dried scabs on upper vermillion border and left lower lip  Neck: Supple, with full range of motion. Trachea midline. Respiratory:  Normal respiratory effort. Diminished, few rales in left bases, no wheezing, no rhonchi. No respiratory distress or accessory muscle use. Cardiovascular: Regular rate and rhythm with normal S1/S2, 2/6 DAVONTE LUSB,  No JVD. Abdomen: Soft, slight diffuse but alondra MID upper  TTP today, non-distended with normal bowel sounds. No rebound or guarding  Musculoskeletal: No clubbing, cyanosis or edema bilaterally. Full range of motion without deformity. No calf tenderness palpation  Skin: Skin color, texture, turgor normal.  No rashes or lesions. Neurologic:  Neurovascularly intact without any focal sensory/motor deficits. Cranial nerves: II-XII intact, grossly non-focal.  Psychiatric: Alert and oriented, flat affect, thought content appropriate, normal insight  Capillary Refill: Brisk,< 3 seconds   Peripheral Pulses: +2 palpable, equal bilaterally       All labs reviewed and interpreted by me:  Labs:   Recent Labs     12/23/22  0506 12/24/22  0548 12/25/22  0450   WBC 9.0 7.7 9.9   HGB 9.0* 8.7* 8.4*   HCT 28.3* 27.3* 26.3*   * 109* 144     Recent Labs     12/23/22  0506 12/24/22  0548 12/25/22  0450    141 139   K 4.4 4.3 4.2    108 105   CO2 24 24 23   BUN 28* 16 23*   CREATININE 3.9* 3.0* 4.5*   CALCIUM 9.0 7.7* 7.5*     No results for input(s): AST, ALT, BILIDIR, BILITOT, ALKPHOS in the last 72 hours. No results for input(s): INR in the last 72 hours. No results for input(s): Curlie Lat in the last 72 hours.     Urinalysis:      Lab Results   Component Value Date/Time    NITRU NEGATIVE 12/16/2022 05:20 PM    WBCUA 10-15 12/16/2022 05:20 PM BACTERIA NONE SEEN 12/16/2022 05:20 PM    RBCUA 5-10 12/16/2022 05:20 PM    BLOODU TRACE 12/16/2022 05:20 PM    SPECGRAV 1.010 12/16/2022 05:20 PM    GLUCOSEU Negative 07/16/2021 04:09 PM    GLUCOSEU NEGATIVE 06/09/2020 03:03 PM       All radiology images and reports reviewed and interpreted by me:  Radiology:  IR FLUORO GUIDED CVA DEVICE PLMT/REPLACE/REMOVAL   Final Result   Status post successful nontunneled dialysis catheter insertion. **This report has been created using voice recognition software. It may contain minor errors which are inherent in voice recognition technology. **      Final report electronically signed by Dr Cayetano Liu on 12/22/2022 1:41 PM      US RENAL COMPLETE   Final Result   Impression:   Echogenic bilateral kidneys, this represents chronic kidney disease. This document has been electronically signed by: Michael Mccyo MD on    12/20/2022 08:27 PM      CT CHEST WO CONTRAST   Final Result   Minimal atelectatic/fibrotic stranding left lung base and lingula. Small hiatus hernia. Distended gallbladder, likely related to not having eaten recently. **This report has been created using voice recognition software. It may contain minor errors which are inherent in voice recognition technology. **      Final report electronically signed by Dr. Tejal Tovar on 12/17/2022 5:56 PM      XR CHEST (2 VW)   Final Result   1. No interval change since previous study dated 9/18/2021. Josesito Scott **This report has been created using voice recognition software. It may contain minor errors which are inherent in voice recognition technology. **      Final report electronically signed by DR Beverley Jiménez on 12/16/2022 11:36 AM          Diet: ADULT ORAL NUTRITION SUPPLEMENT; Breakfast, Dinner; Standard 4 oz Oral Supplement  ADULT DIET; Regular; 4 carb choices (60 gm/meal); Low Fat/Low Chol/High Fiber/2 gm Na;  Less than 60 gm    Smith: no    Microbiology:  throat cx 12/16 (-)    Covid 12/16 (-), influenza 12/16 (-)    Urine cx 12/17 = contaminants    Tele review last 24 hrs:  SR, no arrhythmias    DVT prophylaxis: [] Lovenox                                 [] SCDs                                 [x] SQ Heparin                                 [] Encourage ambulation           [] Already on Anticoagulation     Disposition:    [x] Home with ? [facility]       [] TCU       [] Rehab       [] Psych       [] SNF       [] [place]       [] Other-    Code Status: Full Code    PT/OT Eval Status: following      Electronically signed by SUNG CLAY MD on 12/25/2022 at 7:45 AM

## 2022-12-25 NOTE — PROGRESS NOTES
[facility]   Nephrology progress note  12/25/2022, 1:36 PM      Pt Name:    Fabiana Euceda  MRN:     [id number]     YOB: 1955  Admit Date:    12/16/2022 10:35 AM    Chief Complaint: Nephrology following for acute kidney injury and metabolic acidosis.     Subjective:  Patient seen and examined earlier today during rounds  Discussed with patient   Input and output reviewed  Awake and alert  Patient is on room air  No acute distress  No shortness of breath      Objective:  24HR INTAKE/OUTPUT:    Intake/Output Summary (Last 24 hours) at 12/25/2022 1336  Last data filed at 12/25/2022 0417  Gross per 24 hour   Intake --   Output 1450 ml   Net -1450 ml        Admission weight: 114 lb (51.7 kg)  Wt Readings from Last 3 Encounters:   12/24/22 114 lb 9.6 oz (52 kg)   12/16/22 114 lb (51.7 kg)   12/12/22 116 lb (52.6 kg)        Vitals :   Vitals:    12/25/22 0330 12/25/22 0417 12/25/22 0830 12/25/22 1134   BP: 120/76 (!) 176/77 110/67 137/62   Pulse:  69 94 88   Resp: 14 18 18 18   Temp: 98.2 °F (36.8 °C) 98.5 °F (36.9 °C) 98.2 °F (36.8 °C) 98.1 °F (36.7 °C)   TempSrc: Oral Oral Oral Oral   SpO2: 98% 90% 92% 99%   Weight:       Height:           Physical examination  General Appearance:  No distress, awake and alert  Neck:  Supple, no JVD  Lungs: No use of accessory muscle use, no labored breathing, on room air  Abdomen:  Soft, non - tender  Ext: Trace edema    Medications:  Infusion:    sodium chloride      [Held by provider] sodium chloride Stopped (12/23/22 1525)    sodium chloride       Meds:    epoetin mumtaz-epbx  2,000 Units SubCUTAneous Once per day on Mon Wed Fri    And    epoetin mumtaz-epbx  3,000 Units SubCUTAneous Once per day on Mon Wed Fri    [Held by provider] magnesium oxide  400 mg Oral BID    calcium replacement protocol   Other RX Placeholder    calcitRIOL  0.25 mcg Oral Once per day on Sun Tue Thu Sat    docusate sodium  100 mg Oral Daily    senna  1 tablet Oral Nightly [Held by provider] potassium bicarb-citric acid  40 mEq Oral Daily    amLODIPine  5 mg Oral Daily    [Held by provider] aspirin  81 mg Oral Daily    buPROPion  200 mg Oral BID    [Held by provider] clopidogrel  75 mg Oral Daily    folic acid  163 mcg Oral Daily    montelukast  10 mg Oral Daily    atorvastatin  10 mg Oral Daily    traZODone  50 mg Oral Nightly    polyethylene glycol  17 g Oral Daily    sodium chloride flush  10 mL IntraVENous 2 times per day    [Held by provider] heparin (porcine)  5,000 Units SubCUTAneous 3 times per day       Lab Data :  CBC:   Recent Labs     12/23/22  0506 12/24/22  0548 12/25/22  0450   WBC 9.0 7.7 9.9   HGB 9.0* 8.7* 8.4*   HCT 28.3* 27.3* 26.3*   * 109* 144       CMP:  Recent Labs     12/23/22  0506 12/24/22  0548 12/25/22  0450    141 139   K 4.4 4.3 4.2    108 105   CO2 24 24 23   BUN 28* 16 23*   CREATININE 3.9* 3.0* 4.5*   GLUCOSE 82 96 88   CALCIUM 9.0 7.7* 7.5*       Hepatic:   No results for input(s): LABALBU, AST, ALT, ALB, BILITOT, ALKPHOS in the last 72 hours. Assessment and Plan:  Renal -ERUM. Possibly ATN but cannot rule out other disorders. Monitor interdialytic creatinine, serological work-up is in process.  mg/g. Urine for eosinophils negative. C3 slightly low. C4 normal.  ANCA negative. Kappa lambda light chain normal ratio. Immunofixation negative. Hep C negative, GBM negative. LUCINA positive. Add antidouble-stranded DNA. Add anti-smooth antibody. Ultrasound negative, If no significant improvement in renal function then consider renal biopsy next week. Patient is aware. Interdialytic creatinine rising  Plan hemodialysis treatment tomorrow  Labs daily  Electrolytes -within normal limits  Acid-base status-improved  Hypocalcemia: Improved  CKD stage IV with baseline creatinine 1.8-2.0  Hx of fibromyalgia  Essential hypertension stable  Thrombocytopenia improving slowly  Anemia and renal insufficiency.   Continue with erythropoietin stimulating agents  Discussed with patient    Nelida Martin MD  [facility]    This report has been created using voice recognition software.  It may contain minor errors which are inherent in voice recognition technology

## 2022-12-26 ENCOUNTER — APPOINTMENT (OUTPATIENT)
Dept: ULTRASOUND IMAGING | Age: 67
End: 2022-12-26
Payer: MEDICARE

## 2022-12-26 LAB
ANION GAP SERPL CALCULATED.3IONS-SCNC: 12 MEQ/L (ref 8–16)
BASOPHILS # BLD: 0.5 %
BASOPHILS ABSOLUTE: 0 THOU/MM3 (ref 0–0.1)
BUN BLDV-MCNC: 29 MG/DL (ref 7–22)
CALCIUM SERPL-MCNC: 6.8 MG/DL (ref 8.5–10.5)
CHLORIDE BLD-SCNC: 106 MEQ/L (ref 98–111)
CO2: 23 MEQ/L (ref 23–33)
CREAT SERPL-MCNC: 5.7 MG/DL (ref 0.4–1.2)
EOSINOPHIL # BLD: 1.7 %
EOSINOPHILS ABSOLUTE: 0.1 THOU/MM3 (ref 0–0.4)
ERYTHROCYTE [DISTWIDTH] IN BLOOD BY AUTOMATED COUNT: 17.1 % (ref 11.5–14.5)
ERYTHROCYTE [DISTWIDTH] IN BLOOD BY AUTOMATED COUNT: 56.8 FL (ref 35–45)
GFR SERPL CREATININE-BSD FRML MDRD: 8 ML/MIN/1.73M2
GLUCOSE BLD-MCNC: 70 MG/DL (ref 70–108)
HCT VFR BLD CALC: 23.8 % (ref 37–47)
HEMOGLOBIN: 7.5 GM/DL (ref 12–16)
IMMATURE GRANS (ABS): 0.12 THOU/MM3 (ref 0–0.07)
IMMATURE GRANULOCYTES: 1.4 %
LYMPHOCYTES # BLD: 24.1 %
LYMPHOCYTES ABSOLUTE: 2 THOU/MM3 (ref 1–4.8)
MCH RBC QN AUTO: 29 PG (ref 26–33)
MCHC RBC AUTO-ENTMCNC: 31.5 GM/DL (ref 32.2–35.5)
MCV RBC AUTO: 91.9 FL (ref 81–99)
MONOCYTES # BLD: 9 %
MONOCYTES ABSOLUTE: 0.7 THOU/MM3 (ref 0.4–1.3)
NUCLEATED RED BLOOD CELLS: 0 /100 WBC
PLATELET # BLD: 170 THOU/MM3 (ref 130–400)
PMV BLD AUTO: 10.5 FL (ref 9.4–12.4)
POTASSIUM REFLEX MAGNESIUM: 4.2 MEQ/L (ref 3.5–5.2)
RBC # BLD: 2.59 MILL/MM3 (ref 4.2–5.4)
SEG NEUTROPHILS: 63.3 %
SEGMENTED NEUTROPHILS ABSOLUTE COUNT: 5.3 THOU/MM3 (ref 1.8–7.7)
SODIUM BLD-SCNC: 141 MEQ/L (ref 135–145)
WBC # BLD: 8.3 THOU/MM3 (ref 4.8–10.8)

## 2022-12-26 PROCEDURE — 6370000000 HC RX 637 (ALT 250 FOR IP)

## 2022-12-26 PROCEDURE — 1200000003 HC TELEMETRY R&B

## 2022-12-26 PROCEDURE — 36415 COLL VENOUS BLD VENIPUNCTURE: CPT

## 2022-12-26 PROCEDURE — 6370000000 HC RX 637 (ALT 250 FOR IP): Performed by: PHYSICIAN ASSISTANT

## 2022-12-26 PROCEDURE — 6360000002 HC RX W HCPCS: Performed by: INTERNAL MEDICINE

## 2022-12-26 PROCEDURE — 76705 ECHO EXAM OF ABDOMEN: CPT

## 2022-12-26 PROCEDURE — 80048 BASIC METABOLIC PNL TOTAL CA: CPT

## 2022-12-26 PROCEDURE — 85025 COMPLETE CBC W/AUTO DIFF WBC: CPT

## 2022-12-26 RX ADMIN — TRAZODONE HYDROCHLORIDE 50 MG: 50 TABLET ORAL at 20:48

## 2022-12-26 RX ADMIN — BUPROPION HYDROCHLORIDE 200 MG: 100 TABLET, FILM COATED, EXTENDED RELEASE ORAL at 07:52

## 2022-12-26 RX ADMIN — POLYETHYLENE GLYCOL 3350 17 G: 17 POWDER, FOR SOLUTION ORAL at 07:51

## 2022-12-26 RX ADMIN — ATORVASTATIN CALCIUM 10 MG: 10 TABLET, FILM COATED ORAL at 07:52

## 2022-12-26 RX ADMIN — SENNOSIDES 8.6 MG: 8.6 TABLET, COATED ORAL at 20:48

## 2022-12-26 RX ADMIN — EPOETIN ALFA-EPBX 3000 UNITS: 3000 INJECTION, SOLUTION INTRAVENOUS; SUBCUTANEOUS at 07:55

## 2022-12-26 RX ADMIN — AMLODIPINE BESYLATE 5 MG: 5 TABLET ORAL at 07:52

## 2022-12-26 RX ADMIN — EPOETIN ALFA-EPBX 2000 UNITS: 2000 INJECTION, SOLUTION INTRAVENOUS; SUBCUTANEOUS at 07:54

## 2022-12-26 RX ADMIN — MONTELUKAST SODIUM 10 MG: 10 TABLET ORAL at 20:48

## 2022-12-26 RX ADMIN — ONDANSETRON 4 MG: 4 TABLET, ORALLY DISINTEGRATING ORAL at 08:03

## 2022-12-26 RX ADMIN — ONDANSETRON 4 MG: 4 TABLET, ORALLY DISINTEGRATING ORAL at 16:46

## 2022-12-26 RX ADMIN — DOCUSATE SODIUM 100 MG: 100 CAPSULE, LIQUID FILLED ORAL at 07:51

## 2022-12-26 RX ADMIN — BUPROPION HYDROCHLORIDE 200 MG: 100 TABLET, FILM COATED, EXTENDED RELEASE ORAL at 20:48

## 2022-12-26 RX ADMIN — ACETAMINOPHEN 650 MG: 325 TABLET ORAL at 02:53

## 2022-12-26 RX ADMIN — FOLIC ACID 500 MCG: 1 TABLET ORAL at 07:52

## 2022-12-26 RX ADMIN — GUAIFENESIN AND DEXTROMETHORPHAN HYDROBROMIDE 1 TABLET: 600; 30 TABLET, EXTENDED RELEASE ORAL at 07:52

## 2022-12-26 ASSESSMENT — PAIN DESCRIPTION - DESCRIPTORS: DESCRIPTORS: ACHING

## 2022-12-26 ASSESSMENT — PAIN DESCRIPTION - ORIENTATION: ORIENTATION: RIGHT

## 2022-12-26 ASSESSMENT — PAIN SCALES - GENERAL: PAINLEVEL_OUTOF10: 7

## 2022-12-26 ASSESSMENT — PAIN DESCRIPTION - LOCATION: LOCATION: EAR

## 2022-12-26 ASSESSMENT — PAIN - FUNCTIONAL ASSESSMENT: PAIN_FUNCTIONAL_ASSESSMENT: ACTIVITIES ARE NOT PREVENTED

## 2022-12-26 NOTE — PROGRESS NOTES
[facility]   Nephrology progress note  12/26/2022, 1:12 PM      Pt Name:    Fabiana Euceda  MRN:     [id number]     YOB: 1955  Admit Date:    12/16/2022 10:35 AM    Chief Complaint: Nephrology following for acute kidney injury and metabolic acidosis.     Subjective:  Patient seen and examined earlier today during rounds  Discussed with patient   Input and output reviewed  Awake and alert  Patient is on room air  No shortness of breath      Objective:  24HR INTAKE/OUTPUT:    Intake/Output Summary (Last 24 hours) at 12/26/2022 1312  Last data filed at 12/26/2022 0246  Gross per 24 hour   Intake --   Output 700 ml   Net -700 ml        Admission weight: 114 lb (51.7 kg)  Wt Readings from Last 3 Encounters:   12/26/22 114 lb (51.7 kg)   12/16/22 114 lb (51.7 kg)   12/12/22 116 lb (52.6 kg)        Vitals :   Vitals:    12/26/22 0255 12/26/22 0310 12/26/22 0800 12/26/22 1100   BP: 134/63 115/60 136/63 137/64   Pulse: 87 88 85 88   Resp: 18 18 16 16   Temp: 97.5 °F (36.4 °C) 97.7 °F (36.5 °C) 98 °F (36.7 °C) 98.1 °F (36.7 °C)   TempSrc: Oral Oral Oral Oral   SpO2: 99% 100% 98% 99%   Weight:  114 lb (51.7 kg)     Height:  5' (1.524 m)         Physical examination  General Appearance:  No distress, awake and alert  Neck:  Supple, no JVD  Lungs: No use of accessory muscle use, no labored breathing, on room air  Abdomen:  Soft, non - tender  Ext: Trace edema    Medications:  Infusion:    sodium chloride      [Held by provider] sodium chloride Stopped (12/23/22 1525)    sodium chloride       Meds:    epoetin mumtaz-epbx  2,000 Units SubCUTAneous Once per day on Mon Wed Fri    And    epoetin mumtaz-epbx  3,000 Units SubCUTAneous Once per day on Mon Wed Fri    [Held by provider] magnesium oxide  400 mg Oral BID    calcium replacement protocol   Other RX Placeholder    calcitRIOL  0.25 mcg Oral Once per day on Sun Tue Thu Sat    docusate sodium  100 mg Oral Daily    senna  1 tablet Oral Nightly [Held by provider] potassium bicarb-citric acid  40 mEq Oral Daily    amLODIPine  5 mg Oral Daily    [Held by provider] aspirin  81 mg Oral Daily    buPROPion  200 mg Oral BID    [Held by provider] clopidogrel  75 mg Oral Daily    folic acid  372 mcg Oral Daily    montelukast  10 mg Oral Daily    atorvastatin  10 mg Oral Daily    traZODone  50 mg Oral Nightly    polyethylene glycol  17 g Oral Daily    sodium chloride flush  10 mL IntraVENous 2 times per day    [Held by provider] heparin (porcine)  5,000 Units SubCUTAneous 3 times per day       Lab Data :  CBC:   Recent Labs     12/24/22  0548 12/25/22  0450 12/26/22  0704   WBC 7.7 9.9 8.3   HGB 8.7* 8.4* 7.5*   HCT 27.3* 26.3* 23.8*   * 144 170       CMP:  Recent Labs     12/24/22  0548 12/25/22  0450 12/26/22  0704    139 141   K 4.3 4.2 4.2    105 106   CO2 24 23 23   BUN 16 23* 29*   CREATININE 3.0* 4.5* 5.7*   GLUCOSE 96 88 70   CALCIUM 7.7* 7.5* 6.8*       Hepatic:   No results for input(s): LABALBU, AST, ALT, ALB, BILITOT, ALKPHOS in the last 72 hours. Assessment and Plan:  Renal -ERUM. Possibly ATN but cannot rule out other disorders. Monitor interdialytic creatinine, serological work-up is in process.  mg/g. Urine for eosinophils negative. C3 slightly low. C4 normal.  ANCA negative. Kappa lambda light chain normal ratio. Immunofixation negative. Hep C negative, GBM negative. LUCINA positive. Added antidouble-stranded DNA. Added anti-smooth antibody. Ultrasound negative, If no significant improvement in renal function then plan renal biopsy this week-aspirin and Plavix are already on hold  No suggestion of renal recovery at this time. Interdialytic creatinine continues to rise  Plan hemodialysis treatment today. Orders placed.   Discussed with patient, family member and dialysis staff    Electrolytes -within normal limits  Acid-base status-improved  Hypocalcemia: Improved  CKD stage IV with baseline creatinine 1.8-2.0  Hx of fibromyalgia  Essential hypertension stable  Thrombocytopenia improving slowly  Anemia and renal insufficiency. Continue with erythropoietin stimulating agents  Discussed with patient and family member    Barbara Rivera MD  [facility]    This report has been created using voice recognition software.  It may contain minor errors which are inherent in voice recognition technology

## 2022-12-26 NOTE — FLOWSHEET NOTE
stable 3 hour treatment. 1.5 liters net uf. cath lines flushed with 0.9 ns, clamped and tegos in place. dressing changed per protocol. TX record printed for scanning into EMR. Report called to primary RN.    12/26/22 1300 12/26/22 1627   Vital Signs   BP (!) 150/52 (!) 161/68   Temp 97.9 °F (36.6 °C) 97.1 °F (36.2 °C)   Heart Rate 90 97   Resp 14 18   Weight 122 lb 2.2 oz (55.4 kg) 118 lb 13.3 oz (53.9 kg)   Weight Method Bed scale Bed scale   Post-Hemodialysis Assessment   Post-Treatment Procedures  --  Blood returned;Catheter Capped, clamped with Saline x2 ports   Machine Disinfection Process  --  Acid/Vinegar Clean;Heat Disinfect; Exterior Machine Disinfection   Blood Volume Processed (Liters)  --  44.1 l/min   Dialyzer Clearance  --  Lightly streaked   Duration of Treatment (minutes)  --  180 minutes   Heparin Amount Administered During Treatment (mL)  --  0 mL   Hemodialysis Intake (ml)  --  400 ml   Hemodialysis Output (ml)  --  1900 ml   NET Removed (ml)  --  1500   Tolerated Treatment  --  Good

## 2022-12-26 NOTE — PROGRESS NOTES
Comprehensive Nutrition Assessment    Type and Reason for Visit:  Reassess (po/supplement)    Nutrition Recommendations/Plan:   Consider renal MVI, folbee plus if patient able to tolerate (intermittent nausea reported). ONS: Ensure Compact BID (will only drink chocolate ONS). Stopped carb restriction in diet as no history of diabetes and good glucose levels. Patient doesn't tolerate artificial sweetners very well. If dialysis continues then would recommend stop protein restriction as well. Renal diet basics reinforced with patient 12/19/22-handout given. Malnutrition Assessment:  Malnutrition Status: At risk for malnutrition (Comment) (12/19/22 1053)    Context:  Acute Illness     Findings of the 6 clinical characteristics of malnutrition:  Energy Intake:  50% or less of estimated energy requirements for 5 or more days  Weight Loss:  No significant weight loss     Body Fat Loss:  No significant body fat loss     Muscle Mass Loss:  No significant muscle mass loss    Fluid Accumulation:  No significant fluid accumulation     Strength:  Not Performed    Nutrition Assessment:     Pt. with some improvement from a nutritional standpoint AEB reports of less nausea with the use of zofran, some meal intake greater than 50%, and willing to drink ONS. Remains at risk for further nutritional compromise r/t meal intake less than 75%, continued intermittent nausea, increased nutrient needs d/t known losses with dialysis, admit with dehydration, ERUM on CKD, s/p temporary HD catheter placed 12/22/22 with HD initiation,  hemoptysis-resolved, and underlying medical condition (hx:CVA, depression, fibromyalgia, HLD, HTN, IBS, CKD IV ). Nutrition Related Findings:    Pt. Report/Treatments/Miscellaneous: Patient seen in dialysis. Temporary hemodialysis catheter placed 12/22/22, had HD 12/22, 12/23/22, and today. She reports nausea improved with help of zofran.   States appetite still not very good but will drink Ensure Compact if chocolate. 1100 mls urine output. GI Status: BM 12/23/22  Pertinent Labs: 12/26/22: Sodium 141, Potassium 4.2, BUN 29, Creatinine 5.7, Glucose 70, Hgb  7.5  Pertinent Meds: lipitor, calcitriol, colace, folvite, glycolax, senokot, prn zofran    Wound Type: None       Current Nutrition Intake & Therapies:    Average Meal Intake: 51-75%, 26-50%  Average Supplements Intake:  (drinking if chocolate)  ADULT DIET; Regular; 4 carb choices (60 gm/meal); Low Fat/Low Chol/High Fiber/2 gm Na; Less than 60 gm  ADULT ORAL NUTRITION SUPPLEMENT; Breakfast, Dinner; Standard 4 oz Oral Supplement    Anthropometric Measures:  Height: 5' (152.4 cm)  Ideal Body Weight (IBW): 100 lbs (45 kg)    Admission Body Weight: 117 lb 14.4 oz (53.5 kg) (12/17)  Current Body Weight: 114 lb (51.7 kg) (12/26/22 no edema, started temp HD 12/22/22),   IBW. Weight Source: Not Specified  Current BMI (kg/m2): 22.3  Usual Body Weight:  (wt hx per EMR: 7/14/22: 119 lbs 12.8 oz, 9/19/22: 121 lbs 3.2 oz)                       BMI Categories: Normal Weight (BMI 22.0 to 24.9) age over 72    Estimated Daily Nutrient Needs:  Energy Requirements Based On: Kcal/kg  Weight Used for Energy Requirements: Current  Energy (kcal/day): 2411-0120 kcals/day (25-30 kcals/kg)  Weight Used for Protein Requirements:  (52kg)  Protein (g/day): 42-52 grams (0.8-1 gram/kg/day)  if no dialysis, 62-73 grams (1.2-1.4 grams/kg/day) if dialysis continues         Nutrition Diagnosis:   Inadequate oral intake related to altered GI function as evidenced by poor intake prior to admission, intake 51-75%, intake 26-50%, nausea    Nutrition Interventions:   Food and/or Nutrient Delivery: Continue Current Diet, Continue Oral Nutrition Supplement  Nutrition Education/Counseling:  (Encouraged oral intake and ONS use. Discussed increased protein needs if dialysis continues.   Renal diet basics reviewed with patient 12/19/22, handout given.)  Coordination of Nutrition Care: Continue to monitor while inpatient       Goals:     Goals: PO intake 75% or greater, by next RD assessment       Nutrition Monitoring and Evaluation:      Food/Nutrient Intake Outcomes: Food and Nutrient Intake, Supplement Intake  Physical Signs/Symptoms Outcomes: Biochemical Data, GI Status, Hemodynamic Status, Nutrition Focused Physical Findings, Weight    Discharge Planning:     Too soon to determine     Chanda Rosales RD, LD  Contact: [phone]

## 2022-12-26 NOTE — PLAN OF CARE
Problem: Discharge Planning  Goal: Discharge to home or other facility with appropriate resources  12/25/2022 2358 by Rashawn Casey RN  Outcome: Progressing  Note: Pt prefers to return home upon discharge with . Problem: Skin/Tissue Integrity  Goal: Absence of new skin breakdown  Description: 1. Monitor for areas of redness and/or skin breakdown  2. Assess vascular access sites hourly  3. Every 4-6 hours minimum:  Change oxygen saturation probe site  4. Every 4-6 hours:  If on nasal continuous positive airway pressure, respiratory therapy assess nares and determine need for appliance change or resting period. 12/25/2022 2358 by Rashawn Casey RN  Outcome: Progressing  Note: No new skin breakdown so far this shift, continue to encourage turning as pt does so herself. Problem: Safety - Adult  Goal: Free from fall injury  12/25/2022 2358 by Rashawn Casey RN  Outcome: Progressing  Note: No falls so far this shift, call light and bedside table within reach. Bed in lowest position and alarm on, nonskid socks on bilaterally. Problem: ABCDS Injury Assessment  Goal: Absence of physical injury  12/25/2022 2358 by Rashawn Casey RN  Outcome: Progressing  Note: No harm to self or others at this time, will continue to calm environment. Problem: Metabolic/Fluid and Electrolytes - Adult  Goal: Electrolytes maintained within normal limits  12/25/2022 2358 by Rashawn Casey RN  Outcome: Progressing  Note: Will continue to assess with am labs. Problem: Metabolic/Fluid and Electrolytes - Adult  Goal: Hemodynamic stability and optimal renal function maintained  12/25/2022 2358 by Rashawn Casey RN  Outcome: Progressing  Note: Pt getting hemodialysis, will continue to assess renal function. Urinating well.       Problem: Chronic Conditions and Co-morbidities  Goal: Patient's chronic conditions and co-morbidity symptoms are monitored and maintained or improved  12/25/2022 2358 by Lacy Lou RN  Outcome: Progressing  Note: Pt is on hemo at this time, does have history of CKD. Problem: Nutrition Deficit:  Goal: Optimize nutritional status  12/25/2022 2358 by Lacy Lou RN  Outcome: Progressing  Note: Nutritional intake appears adequate. No complaints of nausea or vomiting at this time. Will continue to assess intake and output. Problem: Skin/Tissue Integrity - Adult  Goal: Skin integrity remains intact  Outcome: Progressing  Note: Skin intact at this time, will continue to assess. Problem: Musculoskeletal - Adult  Goal: Return mobility to safest level of function  Outcome: Progressing  Note: Pt ambulated well with walker to bathroom and back to bed. Will continue to assess. Pedal push/pull was moderate as well as hand grasp. Care plan reviewed with patient. Patient verbalize understanding of the plan of care and contribute to goal setting.

## 2022-12-26 NOTE — FLOWSHEET NOTE
Virtual RN rounds completed. Patient did not respond to VN. Turned camera on for safety. Patient lying in bed, on phone, call light in reach.

## 2022-12-26 NOTE — PROGRESS NOTES
Hospitalist Progress Note      Patient: Myron Vazquez      Unit/Bed:606/006-A    YOB: 1955    MRN: [id number]       Acct: [de-identified]     PCP: Iman Garza MD    Date of Admission: 12/16/2022    Date/Time of Evaluation:  12/26/2022 at 7:51 AM    Assessment/Plan:    ERUM on CKD stage IV: apprec renal assist- ?etiology but suspect due to severe volume depletion per renal  Creat trending down to 6.3 on 12/20 but up again to 7.2 on 12/21 and 6.9 on 12/22 -->  overall down from 8.2 on 12/16 --> Baseline creatinine range ~ 1.8-2.0 in past year   Since not improving per renal 12/22 temp HD catheter and started HD 12/22 and another run 12/23  -> creat down to 3.0 on12/24 but going back up to  5.7 on 12/26/2022 -> HD 12/26 per renal   IVF held 12/23 per renal  Per Dr. Gill Draft re-eval next week if need to cont and may need renal bx --> cont hold ASA, plavix until next week per his rec for possible procedures (?tunneled tessio, ? renal bx)  IVF adjusted from bicarb gtt (12/16 - 12/19) and changed to NS 12/19 at 100 mL and creat cont worsen 12/21 --> HD started 12/22 per renal - IVF stopped 12/23  Renal u/s 12/20/22 with echogenic bilateral kidneys c/w chronic kidney disease  Serologic w/u 12/19 -> C3 little low, C4 normal , Antinuclear Ab Hep-2, IGG (-), myeloperoxidase (-), serine protease IgG (-), elevated kappa/lambda light chains, immunofixation normal, GBM Ab (-), ANCA (-), LUCINA (+)-> reflex (p)  Avoid nephrotoxic agents, renally dose medications - s/p bumex 2 mg po x 1 on 12/18  Strict I&O's  ?renal bx  High AGMA, improving:   Likely due to ERUM/dehydration. On bicarb drip 12/16 - 12/19 as improved to WNL 12/19 and changed to NS 12/20 per renal and IVF held since 12/23  Improving since HD also started 12/22  No signs of infxn -- Lactic acid 0.8 12/16 and 1.1 on 12/20  Nephrology following  Hemoptysis, resolved  Patient noted to have 3 episodes of hemoptysis with coughing.    CT chest 12/17/22 with minimal atelectatic/fibrotic stranding left lung base and lingula. Sputum culture ordered but unable to obtain  Pulmonology consulted, appreciate recs: \"likely combination of recent URI dry humidity and use of plavix and ASA, Pulmonary service will sign off no follow-up needed \"   Hypokalemia   Daily EfferK 40 mEq started 12/17 -> held since 12/20 for normal K - monitor alondra with ERUM  Daily lab  Hypocalcemia  Daily calcium replacement prn  Persistently low - ?add tums- ?need PTH, vit D testing - will leave up to renal  Hypernatremia  Improved -- Secondary to #1, dehydration, nephrology following  Hypomagnesemia  Was on Po mag oxide starting 12/17 -> also given IV Mg 12/19 and then po Mg stopped 12/19 due to improved Mg  Cont monitor Mg prn  Hyperphosphatemia  Per renal no need for phosphorus binders at this time. D/t ERUM, should resolve as ERUM improves. Monitor prn as had improved to WNL on 12/22   Elevated troponin   0.033 on admission x 1 12/16 -> not repeated, EKG no acute ischemic changes. Patient denies chest pain - likely related to ERUM and electrolyte abn   Monitor for cardiac sx - no recent ischemic w/u and last echo 2/2022 with normal EF, no WMA  Abdominal pain, nausea, vomiting:   Patient reports N/V on admission - ?due to not being able to eat but more likely due to ERUM? ? Improving since 12/21  LFT 12/19/22 WNL  CT chest 12/17/22 with \"distended gallbladder likely related to not having eaten recently\" --> cont with nausea with eating   Check RUQ u/s 12/26 -> ??need HIDA scan  Stool softners, as needed miralax   As needed antiemetics zofran, phenergan IM prn   URI - sx improved  CXR on admission negative for pulmonary infiltrates or effusions. Patient has been on doxycycline started on 12/12. No evidence of bacterial infection, thus no further antibiotics. Supportive care.   Thrombocytopenia   Plts tended down from admission -- 131 on admission 12/17 -> down to 85 low on 12/21 and starting to trend up to normal 144 on 12/26/2022   ?etiology - ?plavix but Plts normal on admission and down - no signs of sepsis, ? DIC with renal failure but improving 12/23 - no other meds to contrib  Acute on chronic macrocytic anemia  Trending down during admission and Down to 6.7 on 12/21 am -> repeat 7.0  12/21 and down again 6.8 on 12/22 -> transfused 1 unit PRBC 12/22 as need for HD and hgb up to 8.4 on 12/25 and down to 7.5 6on 12/26/2022    ? PPI/pepcid  down from 10.3 on 12/16 --> Baseline hemoglobin range ~10-11.    ?some slight loss with hemoptysis and per pulm felt due to epistaxis -> has resolved at least since 12/21  ASA, Plavix held starting 12/21 per renal for possible procedures needed   Suspect likely dilutional component from IVF compounded by poor kidney fxn. FOBT (-) 12/19  Iron studies notable for ferritin 402, iron 145, iron sat 90, TIBC less than 162, folate normal, vitamin B12 greater than 2000  Trend and monitor CBC. Retacrit started 12/21 per renal  hold heparin for DVT proph 12/22 and resume once hgb stable  Chronic HFpEF, compensated:   Echo (2/15/2022) notable for EF 55 to 60%, no WMA, G1 DD, mild tricuspid regurg. Continue ASA, statin, Plavix, amlodipine.     Daily weights, strict I&O's -- fluid mgmt per renal with ERUM and starting HD 12/22  Cardiac diet, 2G sodium  Essential hypertension, controlled:   Continue amlodipine 5 mg daily -> ?increase to 10 mg but monitor with HD started 12/22  Cont prn hydralazine  Monitor and adjust prn  (+) LUCINA   (+) on 12/19 -> reflex (p) - further rheum DS-DNA, SS, smooth AB labs sent  Hyperlipidemia:   Cont home Statin  Depression/anxiety:   Continue Wellbutrin, trazodone  ?on librax at home  History of CVA  No new sx  Continued on aspirin, Plavix, statin -> ASA, plavix held starting 12/21 for possible renal procedures and monitor neuro status  Mild MR, mild TR -- per echo 2/2022  Generalized weakness  Pt/OT following - monitor progress     Dispo  -- 12/26/2022 -> apprec renal assist -- creat up today -> HD today per renal -- K stable. Await further renal plan - ?need tunneled catheter and renal bx this week? Cont with nausea with eating -> ? GB with dilated GB on initial imaging but overall is improving -- check RUQ u/s and cont monitor for n/v - ? HIDA. Cont monitor lytes, renal fxn, u/o, BP, h/h, plts, and cont to increase activity with PT/OT. Chief Complaint: Dehydration     Hospital Course:   Per CNP Jacinta Rivera note 12/20 \"Per HPI documented 12/16/2022: Brown Zhong is a 69 y.o. female with PMHx of CVA, depression, who presented to [facility] with chief complaint of dehydration, N/V. Patient states that she was seen by her PCP on Monday, 12/12 for congestion, sore throat, fatigue, weakness on going for >2 weeks. She was started on PO doxycycline. She continues to feel congested but has no sinus pain or pressure, no cough, afebrile. Approx 2 days after starting doxy, patient developed N/V. Reports very poor PO intake, states she has only been eating ice chips. Reports lower abdominal pain, last BM 3 days ago. Reports decreased urine output, states it is very dark. Denies dysuria, urgency, frequency. No chest pain, SOB, f/c. Pt follows with Dr. Yuli Dean for CKD. Admitted to med/tele for further management. \"     12/17/22: Resumed care of patient today. Patient afebrile, satting 94% on room air, with hemodynamically stable vital signs. Patient stating she feels weak and tired this morning. States that she still feels nauseous and when she ate some breakfast this morning she started dry heaving and almost vomited. States that she has very little appetite. Also reported some intermittent abdominal cramping. States she is passing gas. States her last bowel movement was Tuesday. Stool softeners added. Multiple electrolyte derangements as noted above. Creatinine 7.3 today, down from 8.2.   Discussed case with Dr. Kenyetta Little nephrology, who recommend decreasing bicarb drip to rate of 100 mL/hr. Patient making urine 900 mL OP overnight. 12/18/22: Afebrile, hemodynamically stable. No acute events overnight. Patient stating she feels a little better this morning but still feels weak. States that she has not had any nausea or vomiting since yesterday. States she had 2 medium solid BMs and no longer has abdominal pain. States she ate all her breakfast.  Creatinine at 7.3 today, no improvement. Ionized calcium still remains low, will continue to replete. Potassium magnesium still low, will continue to replete. Continue IV bicarb drip.     12/19/22: Afebrile, satting 97% on room air, hemodynamically stable. Patient states she is feeling better. Reports no more hemoptysis. States that she still having some nausea and I eating much of her meals. Denies vomiting and abdominal pains. No other complaints. Creatinine 7.1 today. Slow improvement. Continue to correct electrolyte derangements as noted above. Continue IV bicarb drip     12/20/22: Hemodynamically stable. No acute events. Creatinine downtrending, 6.6 today. H&H fluctuating but stable. Phosphorus coming down. Ionized calcium still low, will replace again today. Potassium and magnesium okay will withhold replacement today. Patient stated that she did have acute episode of nausea followed by vomiting earlier this morning after eating her breakfast.  Currently states that she does not feel nauseous and has no complaints at this time. Continue IV fluids. \"    12/21: n/v improving, creat up to 7.2 again -> IVF adjusted  12/19 per renal as CO2 improving but renal fxn not improving - holding ASA, plavix in prep for possible bx, HD catheter.        12/22 -> creat still 6.9 -> per renal temp HD catheter placed and started on HD  12/23 -> HD ran again per renal - creat down to 3.9   12/24 -> creat down to 3.0  12/25 -> creat up to 4.5 with last HD 12/23    Subjective/HPI:   -- 12/26/2022  --> pt states she is doing ok this am - cont with nausea with eating but no assoicated abd pain or nausea. Denies any cp, sob. On RA. Afebrile. Mouth sores improving. No lightheaded/dizziness. No accurate I/O but with at least 1100 + u/o last 24 hrs. Last BM 12/23 x 2      PMH, SURGICAL HX, FH, SOCIAL HX reviewed and updated as needed.     Medications:  Reviewed    Infusion Medications    sodium chloride      [Held by provider] sodium chloride Stopped (12/23/22 1525)    sodium chloride       Scheduled Medications    epoetin mumtaz-epbx  2,000 Units SubCUTAneous Once per day on Mon Wed Fri    And    epoetin mumtaz-epbx  3,000 Units SubCUTAneous Once per day on Mon Wed Fri    [Held by provider] magnesium oxide  400 mg Oral BID    calcium replacement protocol   Other RX Placeholder    calcitRIOL  0.25 mcg Oral Once per day on Sun Tue Thu Sat    docusate sodium  100 mg Oral Daily    senna  1 tablet Oral Nightly    [Held by provider] potassium bicarb-citric acid  40 mEq Oral Daily    amLODIPine  5 mg Oral Daily    [Held by provider] aspirin  81 mg Oral Daily    buPROPion  200 mg Oral BID    [Held by provider] clopidogrel  75 mg Oral Daily    folic acid  187 mcg Oral Daily    montelukast  10 mg Oral Daily    atorvastatin  10 mg Oral Daily    traZODone  50 mg Oral Nightly    polyethylene glycol  17 g Oral Daily    sodium chloride flush  10 mL IntraVENous 2 times per day    [Held by provider] heparin (porcine)  5,000 Units SubCUTAneous 3 times per day     PRN Meds: sodium chloride, promethazine, chlordiazePOXIDE-clidinium, fluticasone, simethicone, sodium chloride flush, sodium chloride, ondansetron **OR** ondansetron, acetaminophen **OR** acetaminophen, dextromethorphan-guaiFENesin, cetirizine, hydrALAZINE      Intake/Output Summary (Last 24 hours) at 12/26/2022 0751  Last data filed at 12/26/2022 0246  Gross per 24 hour   Intake 480 ml   Output 1100 ml   Net -620 ml       Diet:  ADULT ORAL NUTRITION SUPPLEMENT; Breakfast, Dinner; Standard 4 oz Oral Supplement  ADULT DIET; Regular; 4 carb choices (60 gm/meal); Low Fat/Low Chol/High Fiber/2 gm Na; Less than 60 gm    Exam:  /60   Pulse 88   Temp 97.7 °F (36.5 °C) (Oral)   Resp 18   Ht 5' (1.524 m)   Wt 114 lb (51.7 kg)   SpO2 100%   BMI 22.26 kg/m²     General appearance: No apparent distress, appears older than stated age and cooperative. Oriented x 3. HEENT: Pupils equal, round, and reactive to light. Conjunctivae/corneas clear. MM dry with some cracking on lips - no vesicles but some dried scabs on upper vermillion border and left lower lip  Neck: Supple, with full range of motion. Trachea midline. Respiratory:  Normal respiratory effort. Diminished, few rales in left bases, no wheezing, no rhonchi. No respiratory distress or accessory muscle use. Cardiovascular: Regular rate and rhythm with normal S1/S2, 2/6 DAVONTE LUSB,  No JVD. Abdomen: Soft, slight diffuse but alondra MID/RU  TTP today, non-distended with normal bowel sounds. No rebound or guarding  Musculoskeletal: No clubbing, cyanosis or edema bilaterally. Full range of motion without deformity. No calf tenderness palpation  Skin: Skin color, texture, turgor normal.  No rashes or lesions. Neurologic:  Neurovascularly intact without any focal sensory/motor deficits.  Cranial nerves: II-XII intact, grossly non-focal.  Psychiatric: Alert and oriented, flat affect, thought content appropriate, normal insight  Capillary Refill: Brisk,< 3 seconds   Peripheral Pulses: +2 palpable, equal bilaterally       All labs reviewed and interpreted by me:  Labs:   Recent Labs     12/24/22  0548 12/25/22  0450 12/26/22  0704   WBC 7.7 9.9 8.3   HGB 8.7* 8.4* 7.5*   HCT 27.3* 26.3* 23.8*   * 144 170     Recent Labs     12/24/22  0548 12/25/22  0450    139   K 4.3 4.2    105   CO2 24 23   BUN 16 23*   CREATININE 3.0* 4.5*   CALCIUM 7.7* 7.5*     No results for input(s): AST, ALT, BILIDIR, BILITOT, ALKPHOS in the last 72 hours. No results for input(s): INR in the last 72 hours. No results for input(s): Marianne Highman in the last 72 hours. Urinalysis:      Lab Results   Component Value Date/Time    NITRU NEGATIVE 12/16/2022 05:20 PM    WBCUA 10-15 12/16/2022 05:20 PM    BACTERIA NONE SEEN 12/16/2022 05:20 PM    RBCUA 5-10 12/16/2022 05:20 PM    BLOODU TRACE 12/16/2022 05:20 PM    SPECGRAV 1.010 12/16/2022 05:20 PM    GLUCOSEU Negative 07/16/2021 04:09 PM    GLUCOSEU NEGATIVE 06/09/2020 03:03 PM       All radiology images and reports reviewed and interpreted by me:  Radiology:  IR FLUORO GUIDED CVA DEVICE PLMT/REPLACE/REMOVAL   Final Result   Status post successful nontunneled dialysis catheter insertion. **This report has been created using voice recognition software. It may contain minor errors which are inherent in voice recognition technology. **      Final report electronically signed by Dr Nena Childs on 12/22/2022 1:41 PM      US RENAL COMPLETE   Final Result   Impression:   Echogenic bilateral kidneys, this represents chronic kidney disease. This document has been electronically signed by: Sixto Toure MD on    12/20/2022 08:27 PM      CT CHEST WO CONTRAST   Final Result   Minimal atelectatic/fibrotic stranding left lung base and lingula. Small hiatus hernia. Distended gallbladder, likely related to not having eaten recently. **This report has been created using voice recognition software. It may contain minor errors which are inherent in voice recognition technology. **      Final report electronically signed by Dr. Miladys Trejo on 12/17/2022 5:56 PM      XR CHEST (2 VW)   Final Result   1. No interval change since previous study dated 9/18/2021. Janine Delgado **This report has been created using voice recognition software. It may contain minor errors which are inherent in voice recognition technology. **      Final report electronically signed by DR Martinez Hernandez on 12/16/2022 11:36 AM          Diet: ADULT ORAL NUTRITION SUPPLEMENT; Breakfast, Dinner; Standard 4 oz Oral Supplement  ADULT DIET; Regular; 4 carb choices (60 gm/meal); Low Fat/Low Chol/High Fiber/2 gm Na; Less than 60 gm    Smith: no    Microbiology:  throat cx 12/16 (-)    Covid 12/16 (-), influenza 12/16 (-)    Urine cx 12/17 = contaminants    Tele review last 24 hrs:  SR, no arrhythmias    DVT prophylaxis: [] Lovenox                                 [] SCDs                                 [x] SQ Heparin                                 [] Encourage ambulation           [] Already on Anticoagulation     Disposition:    [x] Home with ? [facility]       [] TCU       [] Rehab       [] Psych       [] SNF       [] [place]       [] Other-    Code Status: Full Code    PT/OT Eval Status: following      Electronically signed by Erin Null MD on 12/26/2022 at 7:51 AM

## 2022-12-27 LAB
ALBUMIN SERPL-MCNC: 3.4 G/DL (ref 3.5–5.1)
ALP BLD-CCNC: 153 U/L (ref 38–126)
ALT SERPL-CCNC: 20 U/L (ref 11–66)
ANION GAP SERPL CALCULATED.3IONS-SCNC: 13 MEQ/L (ref 8–16)
AST SERPL-CCNC: 20 U/L (ref 5–40)
BASOPHILS # BLD: 0.8 %
BASOPHILS ABSOLUTE: 0.1 THOU/MM3 (ref 0–0.1)
BILIRUB SERPL-MCNC: 0.3 MG/DL (ref 0.3–1.2)
BUN BLDV-MCNC: 15 MG/DL (ref 7–22)
CALCIUM SERPL-MCNC: 8.5 MG/DL (ref 8.5–10.5)
CHLORIDE BLD-SCNC: 103 MEQ/L (ref 98–111)
CO2: 24 MEQ/L (ref 23–33)
CREAT SERPL-MCNC: 3.3 MG/DL (ref 0.4–1.2)
EOSINOPHIL # BLD: 1.2 %
EOSINOPHILS ABSOLUTE: 0.1 THOU/MM3 (ref 0–0.4)
ERYTHROCYTE [DISTWIDTH] IN BLOOD BY AUTOMATED COUNT: 16.8 % (ref 11.5–14.5)
ERYTHROCYTE [DISTWIDTH] IN BLOOD BY AUTOMATED COUNT: 55.8 FL (ref 35–45)
GFR SERPL CREATININE-BSD FRML MDRD: 15 ML/MIN/1.73M2
GLUCOSE BLD-MCNC: 81 MG/DL (ref 70–108)
HCT VFR BLD CALC: 27.9 % (ref 37–47)
HEMOGLOBIN: 8.8 GM/DL (ref 12–16)
IMMATURE GRANS (ABS): 0.17 THOU/MM3 (ref 0–0.07)
IMMATURE GRANULOCYTES: 1.9 %
INR BLD: 0.92 (ref 0.85–1.13)
LYMPHOCYTES # BLD: 30.1 %
LYMPHOCYTES ABSOLUTE: 2.8 THOU/MM3 (ref 1–4.8)
MCH RBC QN AUTO: 28.9 PG (ref 26–33)
MCHC RBC AUTO-ENTMCNC: 31.5 GM/DL (ref 32.2–35.5)
MCV RBC AUTO: 91.5 FL (ref 81–99)
MONOCYTES # BLD: 8.4 %
MONOCYTES ABSOLUTE: 0.8 THOU/MM3 (ref 0.4–1.3)
NUCLEATED RED BLOOD CELLS: 0 /100 WBC
PLATELET # BLD: 239 THOU/MM3 (ref 130–400)
PMV BLD AUTO: 10.6 FL (ref 9.4–12.4)
POTASSIUM SERPL-SCNC: 4.2 MEQ/L (ref 3.5–5.2)
RBC # BLD: 3.05 MILL/MM3 (ref 4.2–5.4)
SEG NEUTROPHILS: 57.6 %
SEGMENTED NEUTROPHILS ABSOLUTE COUNT: 5.3 THOU/MM3 (ref 1.8–7.7)
SODIUM BLD-SCNC: 140 MEQ/L (ref 135–145)
TOTAL PROTEIN: 5.5 G/DL (ref 6.1–8)
WBC # BLD: 9.2 THOU/MM3 (ref 4.8–10.8)

## 2022-12-27 PROCEDURE — 2580000003 HC RX 258: Performed by: PHYSICIAN ASSISTANT

## 2022-12-27 PROCEDURE — 6360000002 HC RX W HCPCS: Performed by: PHYSICIAN ASSISTANT

## 2022-12-27 PROCEDURE — 6370000000 HC RX 637 (ALT 250 FOR IP)

## 2022-12-27 PROCEDURE — 6370000000 HC RX 637 (ALT 250 FOR IP): Performed by: INTERNAL MEDICINE

## 2022-12-27 PROCEDURE — 85610 PROTHROMBIN TIME: CPT

## 2022-12-27 PROCEDURE — 97110 THERAPEUTIC EXERCISES: CPT

## 2022-12-27 PROCEDURE — 1200000003 HC TELEMETRY R&B

## 2022-12-27 PROCEDURE — 80053 COMPREHEN METABOLIC PANEL: CPT

## 2022-12-27 PROCEDURE — 83516 IMMUNOASSAY NONANTIBODY: CPT

## 2022-12-27 PROCEDURE — 85025 COMPLETE CBC W/AUTO DIFF WBC: CPT

## 2022-12-27 PROCEDURE — 86225 DNA ANTIBODY NATIVE: CPT

## 2022-12-27 PROCEDURE — 6370000000 HC RX 637 (ALT 250 FOR IP): Performed by: PHYSICIAN ASSISTANT

## 2022-12-27 PROCEDURE — 36415 COLL VENOUS BLD VENIPUNCTURE: CPT

## 2022-12-27 PROCEDURE — 86235 NUCLEAR ANTIGEN ANTIBODY: CPT

## 2022-12-27 PROCEDURE — 97116 GAIT TRAINING THERAPY: CPT

## 2022-12-27 RX ORDER — CLINDAMYCIN PHOSPHATE 600 MG/50ML
600 INJECTION INTRAVENOUS ONCE
Status: COMPLETED | OUTPATIENT
Start: 2022-12-28 | End: 2022-12-28

## 2022-12-27 RX ORDER — CLINDAMYCIN PHOSPHATE 600 MG/50ML
600 INJECTION INTRAVENOUS ONCE
Status: DISCONTINUED | OUTPATIENT
Start: 2022-12-27 | End: 2022-12-27

## 2022-12-27 RX ORDER — SODIUM CHLORIDE 450 MG/100ML
INJECTION, SOLUTION INTRAVENOUS CONTINUOUS
Status: DISCONTINUED | OUTPATIENT
Start: 2022-12-28 | End: 2022-12-30 | Stop reason: HOSPADM

## 2022-12-27 RX ORDER — MIDAZOLAM HYDROCHLORIDE 1 MG/ML
1 INJECTION INTRAMUSCULAR; INTRAVENOUS ONCE
Status: COMPLETED | OUTPATIENT
Start: 2022-12-28 | End: 2022-12-28

## 2022-12-27 RX ADMIN — FOLIC ACID 500 MCG: 1 TABLET ORAL at 08:14

## 2022-12-27 RX ADMIN — ONDANSETRON 4 MG: 2 INJECTION INTRAMUSCULAR; INTRAVENOUS at 15:23

## 2022-12-27 RX ADMIN — SODIUM CHLORIDE, PRESERVATIVE FREE 10 ML: 5 INJECTION INTRAVENOUS at 20:50

## 2022-12-27 RX ADMIN — CALCITRIOL CAPSULES 0.25 MCG 0.25 MCG: 0.25 CAPSULE ORAL at 08:14

## 2022-12-27 RX ADMIN — AMLODIPINE BESYLATE 5 MG: 5 TABLET ORAL at 08:14

## 2022-12-27 RX ADMIN — ONDANSETRON 4 MG: 4 TABLET, ORALLY DISINTEGRATING ORAL at 11:57

## 2022-12-27 RX ADMIN — POLYETHYLENE GLYCOL 3350 17 G: 17 POWDER, FOR SOLUTION ORAL at 08:14

## 2022-12-27 RX ADMIN — BUPROPION HYDROCHLORIDE 200 MG: 100 TABLET, FILM COATED, EXTENDED RELEASE ORAL at 20:50

## 2022-12-27 RX ADMIN — SENNOSIDES 8.6 MG: 8.6 TABLET, COATED ORAL at 20:50

## 2022-12-27 RX ADMIN — TRAZODONE HYDROCHLORIDE 50 MG: 50 TABLET ORAL at 20:50

## 2022-12-27 RX ADMIN — DOCUSATE SODIUM 100 MG: 100 CAPSULE, LIQUID FILLED ORAL at 08:14

## 2022-12-27 RX ADMIN — BUPROPION HYDROCHLORIDE 200 MG: 100 TABLET, FILM COATED, EXTENDED RELEASE ORAL at 08:14

## 2022-12-27 RX ADMIN — MONTELUKAST SODIUM 10 MG: 10 TABLET ORAL at 20:50

## 2022-12-27 RX ADMIN — ATORVASTATIN CALCIUM 10 MG: 10 TABLET, FILM COATED ORAL at 08:14

## 2022-12-27 NOTE — PROGRESS NOTES
Order also received for random renal biopsy. Adjustments made to scheduled: pt will be getting temp to tunneled at 0800 and then do random renal biopsy in CT at 0830. Pt to be NPO 4 hours prior to procedure; start at 0400. Continue to hold all blood thinners.

## 2022-12-27 NOTE — CARE COORDINATION
12/27/22, 2:00 PM EST    DISCHARGE  [address] [place] day: 11  [facility] Reason for admit: Dehydration [E86.0]  [facility] [N17.9]  Acute renal failure superimposed on chronic kidney disease, unspecified CKD stage, unspecified acute renal failure type ([facility] [N17.9, N18.9]  Nausea and vomiting, unspecified vomiting type [R11.2]   Barriers to Discharge: Plan to switch to tunneled catheter Wednesday. Renal biopsy ordered. PCP: Zuleyma Laureano MD  Readmission Risk Score: 17.5%  Patient Goals/Plan/Treatment Preferences: Return home with .  OP HD referral submitted to [facility] via portal.

## 2022-12-27 NOTE — PROGRESS NOTES
Hospitalist Progress Note      Patient: Gloria Howe      Unit/Bed:6K-06/006-A    YOB: 1955    MRN: [id number]       Acct: [de-identified]     PCP: Kayden Bernal MD    Date of Admission: 12/16/2022    Date/Time of Evaluation:  12/27/2022 at 10:03 AM    Assessment/Plan:    ERUM on CKD stage IV: apprec renal assist- ?etiology but suspect due to severe volume depletion per renal  Creat trending down to 6.3 on 12/20 but up again to 7.2 on 12/21 and 6.9 on 12/22 --> overall down from 8.2 on 12/16 --> Baseline creatinine range ~ 1.8-2.0 in past year   Since not improving per renal 12/22 temp HD catheter and started HD 12/22 and another run 12/23  -> creat down to 3.0 on12/24 but going back up to  5.7 on 12/26 and s/p HD 12/26 and creat down to 3.3 on 12/27/2022   Plan for tunneled HD catheter per d/w Dr Grover Begun   IVF held 12/23 per renal  ?? renal bx -->  cont hold ASA, plavix until next week per his rec for possible procedures (?tunneled tessio, ? renal bx)  IVF adjusted from bicarb gtt (12/16 - 12/19) and changed to NS 12/19 at 100 mL and creat cont worsen 12/21 --> HD started 12/22 per renal - IVF stopped 12/23  Renal u/s 12/20/22 with echogenic bilateral kidneys c/w chronic kidney disease  Serologic w/u 12/19 -> C3 little low, C4 normal , Antinuclear Ab Hep-2, IGG (-), myeloperoxidase (-), serine protease IgG (-), elevated kappa/lambda light chains, immunofixation normal, GBM Ab (-), ANCA (-), LUCINA (+)-> reflex (p)  Avoid nephrotoxic agents, renally dose medications - s/p bumex 2 mg po x 1 on 12/18  Strict I&O's  High AGMA, improving:   Likely due to ERUM/dehydration. On bicarb drip 12/16 - 12/19 as improved to WNL 12/19 and changed to NS 12/20 per renal and IVF held since 12/23  Improving since HD also started 12/22  No signs of infxn -- Lactic acid 0.8 12/16 and 1.1 on 12/20  Nephrology following  Hemoptysis, resolved  Patient noted to have 3 episodes of hemoptysis with coughing.    CT chest 12/17/22 with minimal atelectatic/fibrotic stranding left lung base and lingula. Sputum culture ordered but unable to obtain  Pulmonology consulted, appreciate recs: \"likely combination of recent URI dry humidity and use of plavix and ASA, Pulmonary service will sign off no follow-up needed \"   Hypokalemia   Replaced and improved - monitor with ERUM   Hypocalcemia  Daily calcium replacement prn  Persistently low - ?add tums- ?need PTH, vit D testing - will leave up to renal  Hypernatremia  Improved -- Secondary to #1, dehydration, nephrology following  Hypomagnesemia  Was on Po mag oxide starting 12/17 -> also given IV Mg 12/19 and then po Mg stopped 12/19 due to improved Mg  Cont monitor Mg prn  Hyperphosphatemia  Per renal no need for phosphorus binders at this time. D/t ERUM, should resolve as ERUM improves. Monitor prn as had improved to WNL on 12/22   Elevated troponin   0.033 on admission x 1 12/16 -> not repeated, EKG no acute ischemic changes. Patient denies chest pain - likely related to ERUM and electrolyte abn   Monitor for cardiac sx - no recent ischemic w/u and last echo 2/2022 with normal EF, no WMA  Abdominal pain, nausea, vomiting:   Patient reports N/V on admission - ?due to not being able to eat but more likely due to ERUM? ? Improving since 12/21  LFT 12/19/22 WNL  CT chest 12/17/22 with \"distended gallbladder likely related to not having eaten recently\" --> cont with nausea with eating   Check RUQ u/s 12/26 with numerous small non-vascular echogenic foci adjacent to GB wall thought to represent adherent sludge -> ??need HIDA scan  Stool softners, as needed miralax   As needed antiemetics zofran, phenergan IM prn   URI - sx improved  CXR on admission negative for pulmonary infiltrates or effusions. Patient has been on doxycycline started on 12/12. No evidence of bacterial infection, thus no further antibiotics. Supportive care.   Thrombocytopenia - resolved  Plts tended down from admission -- 131 on admission 12/17 -> down to 85 low on 12/21 and starting to trend up to normal since 12/25  ?etiology - ?plavix but Plts normal on admission and down - no signs of sepsis, ? DIC with renal failure but improving 12/23 - no other meds to contrib  Acute on chronic macrocytic anemia  Trending down during admission and Down to 6.7 on 12/21 am -> repeat 7.0  12/21 and down again 6.8 on 12/22 -> transfused 1 unit PRBC 12/22 as need for HD and hgb up to 8.4 on 12/25 and stable 8.8 on 12/27/2022    ? PPI/pepcid  down from 10.3 on 12/16 --> Baseline hemoglobin range ~10-11.    ?some slight loss with hemoptysis and per pulm felt due to epistaxis -> has resolved at least since 12/21  ASA, Plavix held starting 12/21 per renal for possible procedures needed   Suspect likely dilutional component from IVF compounded by poor kidney fxn. FOBT (-) 12/19  Iron studies notable for ferritin 402, iron 145, iron sat 90, TIBC less than 162, folate normal, vitamin B12 greater than 2000  Trend and monitor CBC. Retacrit started 12/21 per renal  hold heparin for DVT proph 12/22 and resume once hgb stable  Chronic HFpEF, compensated:   Echo (2/15/2022) notable for EF 55 to 60%, no WMA, G1 DD, mild tricuspid regurg. Continue ASA, statin, Plavix, amlodipine.     Daily weights, strict I&O's -- fluid mgmt per renal with ERUM and starting HD 12/22  Cardiac diet, 2G sodium  Essential hypertension, controlled:   Continue amlodipine 5 mg daily -> ?increase to 10 mg but monitor with HD started 12/22  Cont prn hydralazine  Monitor and adjust prn  (+) LUCINA   (+) on 12/19 -> reflex (p) - further rheum DS-DNA, SS, smooth AB labs sent  Hyperlipidemia:   Cont home Statin  Depression/anxiety:   Continue Wellbutrin, trazodone  ?on librax at home  History of CVA  No new sx  Continued on aspirin, Plavix, statin -> ASA, plavix held starting 12/21 for possible renal procedures and monitor neuro status  Mild MR, mild TR -- per echo 2/2022  Generalized weakness  Pt/OT following - monitor progress     Dispo  -- 12/27/2022 -> apprec renal assist -- creat improves with HD -- d/w Dr. Hoffman Place and plan for tunneled HD catheter and likely renal bx  -> lytes, h/h stable. Await further renal plan. Cont with nausea with eating --> ? ?HIDA, ?need GI eval as ?if this lead to dehydration and ERUM. Cont monitor lytes, renal fxn, u/o, BP, h/h, plts, and cont to increase activity with PT/OT. Chief Complaint: Dehydration     Hospital Course:   Per [facility] note 12/20 \"Per HPI documented 12/16/2022: Burton Hudson is a 69 y.o. female with PMHx of CVA, depression, who presented to [facility] with chief complaint of dehydration, N/V. Patient states that she was seen by her PCP on Monday, 12/12 for congestion, sore throat, fatigue, weakness on going for >2 weeks. She was started on PO doxycycline. She continues to feel congested but has no sinus pain or pressure, no cough, afebrile. Approx 2 days after starting doxy, patient developed N/V. Reports very poor PO intake, states she has only been eating ice chips. Reports lower abdominal pain, last BM 3 days ago. Reports decreased urine output, states it is very dark. Denies dysuria, urgency, frequency. No chest pain, SOB, f/c. Pt follows with Dr. Xuan Babb for CKD. Admitted to med/tele for further management. \"     12/17/22: Resumed care of patient today. Patient afebrile, satting 94% on room air, with hemodynamically stable vital signs. Patient stating she feels weak and tired this morning. States that she still feels nauseous and when she ate some breakfast this morning she started dry heaving and almost vomited. States that she has very little appetite. Also reported some intermittent abdominal cramping. States she is passing gas. States her last bowel movement was Tuesday. Stool softeners added. Multiple electrolyte derangements as noted above. Creatinine 7.3 today, down from 8.2.   Discussed case with Dr. Alvie Gosselin nephrology, who recommend decreasing bicarb drip to rate of 100 mL/hr. Patient making urine 900 mL OP overnight. 12/18/22: Afebrile, hemodynamically stable. No acute events overnight. Patient stating she feels a little better this morning but still feels weak. States that she has not had any nausea or vomiting since yesterday. States she had 2 medium solid BMs and no longer has abdominal pain. States she ate all her breakfast.  Creatinine at 7.3 today, no improvement. Ionized calcium still remains low, will continue to replete. Potassium magnesium still low, will continue to replete. Continue IV bicarb drip.     12/19/22: Afebrile, satting 97% on room air, hemodynamically stable. Patient states she is feeling better. Reports no more hemoptysis. States that she still having some nausea and I eating much of her meals. Denies vomiting and abdominal pains. No other complaints. Creatinine 7.1 today. Slow improvement. Continue to correct electrolyte derangements as noted above. Continue IV bicarb drip     12/20/22: Hemodynamically stable. No acute events. Creatinine downtrending, 6.6 today. H&H fluctuating but stable. Phosphorus coming down. Ionized calcium still low, will replace again today. Potassium and magnesium okay will withhold replacement today. Patient stated that she did have acute episode of nausea followed by vomiting earlier this morning after eating her breakfast.  Currently states that she does not feel nauseous and has no complaints at this time. Continue IV fluids. \"    12/21: n/v improving, creat up to 7.2 again -> IVF adjusted  12/19 per renal as CO2 improving but renal fxn not improving - holding ASA, plavix in prep for possible bx, HD catheter.        12/22 -> creat still 6.9 -> per renal temp HD catheter placed and started on HD  12/23 -> HD ran again per renal - creat down to 3.9   12/24 -> creat down to 3.0  12/25 -> creat up to 4.5 with last HD 12/23 12/26 -> creat 5.7 and HD run 12/26; c/o nausea with eating although better    Subjective/HPI:   -- 12/27/2022  --> pt states she is doing ok this am - cont with nausea with eating but no assoicated abd pain or nausea but overall improving. Denies any cp, sob. On RA. Afebrile. Mouth sores improving. No lightheaded/dizziness. No accurate I/O. Last BM 12/23 x 2      PMH, SURGICAL HX, FH, SOCIAL HX reviewed and updated as needed.     Medications:  Reviewed    Infusion Medications    sodium chloride      [Held by provider] sodium chloride Stopped (12/23/22 1525)    sodium chloride       Scheduled Medications    epoetin mumtaz-epbx  2,000 Units SubCUTAneous Once per day on Mon Wed Fri    And    epoetin mumtaz-epbx  3,000 Units SubCUTAneous Once per day on Mon Wed Fri    [Held by provider] magnesium oxide  400 mg Oral BID    calcium replacement protocol   Other RX Placeholder    calcitRIOL  0.25 mcg Oral Once per day on Sun Tue Thu Sat    docusate sodium  100 mg Oral Daily    senna  1 tablet Oral Nightly    [Held by provider] potassium bicarb-citric acid  40 mEq Oral Daily    amLODIPine  5 mg Oral Daily    [Held by provider] aspirin  81 mg Oral Daily    buPROPion  200 mg Oral BID    [Held by provider] clopidogrel  75 mg Oral Daily    folic acid  444 mcg Oral Daily    montelukast  10 mg Oral Daily    atorvastatin  10 mg Oral Daily    traZODone  50 mg Oral Nightly    polyethylene glycol  17 g Oral Daily    sodium chloride flush  10 mL IntraVENous 2 times per day    [Held by provider] heparin (porcine)  5,000 Units SubCUTAneous 3 times per day     PRN Meds: sodium chloride, promethazine, chlordiazePOXIDE-clidinium, fluticasone, simethicone, sodium chloride flush, sodium chloride, ondansetron **OR** ondansetron, acetaminophen **OR** acetaminophen, dextromethorphan-guaiFENesin, cetirizine, hydrALAZINE      Intake/Output Summary (Last 24 hours) at 12/27/2022 1003  Last data filed at 12/26/2022 2052  Gross per 24 hour   Intake 520 ml   Output 1900 ml   Net -1380 ml       Diet:  ADULT ORAL NUTRITION SUPPLEMENT; Breakfast, Dinner; Standard 4 oz Oral Supplement  ADULT DIET; Regular; Low Fat/Low Chol/High Fiber/2 gm Na; Less than 60 gm    Exam:  /62   Pulse 95   Temp 99.2 °F (37.3 °C) (Oral)   Resp 17   Ht 5' (1.524 m)   Wt 118 lb (53.5 kg)   SpO2 100%   BMI 23.05 kg/m²     General appearance: No apparent distress, appears older than stated age and cooperative.  Oriented x 3.  HEENT: Pupils equal, round, and reactive to light. Conjunctivae/corneas clear.  MM dry with some cracking on lips - no vesicles but some dried scabs on upper vermillion border and left lower lip  Neck: Supple, with full range of motion. Trachea midline.    Respiratory:  Normal respiratory effort. Diminished, few rales in left bases, no wheezing, no rhonchi.  No respiratory distress or accessory muscle use.  Cardiovascular: Regular rate and rhythm with normal S1/S2, 2/6 DAVONTE LUSB,  No JVD.  Abdomen: Soft, slight diffuse but alondra mid upper abd TTP,  non-distended with normal bowel sounds.  No rebound or guarding  Musculoskeletal: No clubbing, cyanosis or edema bilaterally.  Full range of motion without deformity.  No calf tenderness palpation  Skin: Skin color, texture, turgor normal.  No rashes or lesions.  Neurologic:  Neurovascularly intact without any focal sensory/motor deficits. Cranial nerves: II-XII intact, grossly non-focal.  Psychiatric: Alert and oriented, flat affect, thought content appropriate, normal insight  Capillary Refill: Brisk,< 3 seconds   Peripheral Pulses: +2 palpable, equal bilaterally       All labs reviewed and interpreted by me:  Labs:   Recent Labs     12/25/22 0450 12/26/22  0704 12/27/22  0438   WBC 9.9 8.3 9.2   HGB 8.4* 7.5* 8.8*   HCT 26.3* 23.8* 27.9*    170 239     Recent Labs     12/25/22 0450 12/26/22  0704 12/27/22  0438    141 140   K 4.2 4.2 4.2    106 103   CO2 23 23 24   BUN 23* 29* 15   CREATININE 4.5* 5.7* 3.3*  CALCIUM 7.5* 6.8* 8.5     Recent Labs     12/27/22  0438   AST 20   ALT 20   BILITOT 0.3   ALKPHOS 153*       Recent Labs     12/27/22  0438   INR 0.92     No results for input(s): CKTOTAL, TROPONINT in the last 72 hours. Urinalysis:      Lab Results   Component Value Date/Time    NITRU NEGATIVE 12/16/2022 05:20 PM    WBCUA 10-15 12/16/2022 05:20 PM    BACTERIA NONE SEEN 12/16/2022 05:20 PM    RBCUA 5-10 12/16/2022 05:20 PM    BLOODU TRACE 12/16/2022 05:20 PM    SPECGRAV 1.010 12/16/2022 05:20 PM    GLUCOSEU Negative 07/16/2021 04:09 PM    GLUCOSEU NEGATIVE 06/09/2020 03:03 PM       All radiology images and reports reviewed and interpreted by me:  Radiology:  Esmer Salgado   Final Result   Numerous small nonvascular echogenic foci adherent to the gallbladder wall thought to represent adherent sludge. **This report has been created using voice recognition software. It may contain minor errors which are inherent in voice recognition technology. **      Final report electronically signed by Dr Barraza Began on 12/26/2022 3:27 PM      IR FLUORO GUIDED CVA DEVICE PLMT/REPLACE/REMOVAL   Final Result   Status post successful nontunneled dialysis catheter insertion. **This report has been created using voice recognition software. It may contain minor errors which are inherent in voice recognition technology. **      Final report electronically signed by Dr Barraza Began on 12/22/2022 1:41 PM      US RENAL COMPLETE   Final Result   Impression:   Echogenic bilateral kidneys, this represents chronic kidney disease. This document has been electronically signed by: Lencho Jarrell MD on    12/20/2022 08:27 PM      CT CHEST WO CONTRAST   Final Result   Minimal atelectatic/fibrotic stranding left lung base and lingula. Small hiatus hernia. Distended gallbladder, likely related to not having eaten recently. **This report has been created using voice recognition software.   It may contain minor errors which are inherent in voice recognition technology. **      Final report electronically signed by Dr. Isaias Betts on 12/17/2022 5:56 PM      XR CHEST (2 VW)   Final Result   1. No interval change since previous study dated 9/18/2021. Golden Morales **This report has been created using voice recognition software. It may contain minor errors which are inherent in voice recognition technology. **      Final report electronically signed by DR Mary Ann Barclay on 12/16/2022 11:36 AM          Diet: ADULT ORAL NUTRITION SUPPLEMENT; Breakfast, Dinner; Standard 4 oz Oral Supplement  ADULT DIET; Regular; Low Fat/Low Chol/High Fiber/2 gm Na; Less than 60 gm    Smith: no    Microbiology:  throat cx 12/16 (-)    Covid 12/16 (-), influenza 12/16 (-)    Urine cx 12/17 = contaminants    Tele review last 24 hrs:  SR, no arrhythmias    DVT prophylaxis: [] Lovenox                                 [] SCDs                                 [x] SQ Heparin                                 [] Encourage ambulation           [] Already on Anticoagulation     Disposition:    [x] Home with ? [facility]       [] TCU       [] Rehab       [] Psych       [] SNF       [] [facility]       [] Other-    Code Status: Full Code    PT/OT Eval Status: following      Electronically signed by Ruthann Castillo MD on 12/27/2022 at 10:03 AM

## 2022-12-27 NOTE — PLAN OF CARE
Problem: Discharge Planning  Goal: Discharge to home or other facility with appropriate resources  12/27/2022 1036 by Nisha Byrd RN  Outcome: Progressing  Flowsheets (Taken 12/27/2022 0815)  Discharge to home or other facility with appropriate resources: Identify barriers to discharge with patient and caregiver  12/27/2022 0228 by Daniel Rodriguez RN  Outcome: Progressing  Flowsheets  Taken 12/27/2022 0040  Discharge to home or other facility with appropriate resources: Identify barriers to discharge with patient and caregiver  Taken 12/26/2022 2040  Discharge to home or other facility with appropriate resources: Identify barriers to discharge with patient and caregiver     Problem: Skin/Tissue Integrity  Goal: Absence of new skin breakdown  Description: 1. Monitor for areas of redness and/or skin breakdown  2. Assess vascular access sites hourly  3. Every 4-6 hours minimum:  Change oxygen saturation probe site  4. Every 4-6 hours:  If on nasal continuous positive airway pressure, respiratory therapy assess nares and determine need for appliance change or resting period.   12/27/2022 1036 by Nisha Byrd RN  Outcome: Progressing  12/27/2022 0228 by Daniel Rodriguez RN  Outcome: Progressing     Problem: Safety - Adult  Goal: Free from fall injury  12/27/2022 1036 by Nisha Byrd RN  Outcome: Progressing  12/27/2022 0228 by Daniel Rodriguez RN  Outcome: Progressing  Flowsheets (Taken 12/26/2022 2040)  Free From Fall Injury: Instruct family/caregiver on patient safety     Problem: ABCDS Injury Assessment  Goal: Absence of physical injury  12/27/2022 1036 by Nisha Byrd RN  Outcome: Progressing  12/27/2022 0228 by Daniel Rodriguez RN  Outcome: Progressing  Flowsheets (Taken 12/26/2022 2040)  Absence of Physical Injury: Implement safety measures based on patient assessment     Problem: Metabolic/Fluid and Electrolytes - Adult  Goal: Electrolytes maintained within normal limits  12/27/2022 1036 by Indu Moran RN  Outcome: Progressing  Flowsheets (Taken 12/27/2022 0815)  Electrolytes maintained within normal limits: Monitor labs and assess patient for signs and symptoms of electrolyte imbalances  12/27/2022 0228 by Ling Serna RN  Outcome: Progressing  Flowsheets  Taken 12/27/2022 0040  Electrolytes maintained within normal limits: Monitor labs and assess patient for signs and symptoms of electrolyte imbalances  Taken 12/26/2022 2040  Electrolytes maintained within normal limits: Monitor labs and assess patient for signs and symptoms of electrolyte imbalances  Goal: Hemodynamic stability and optimal renal function maintained  12/27/2022 1036 by Indu Moran RN  Outcome: Progressing  Flowsheets (Taken 12/27/2022 0815)  Hemodynamic stability and optimal renal function maintained: Monitor labs and assess for signs and symptoms of volume excess or deficit  12/27/2022 0228 by Ling Serna RN  Outcome: Progressing  Flowsheets  Taken 12/27/2022 0040  Hemodynamic stability and optimal renal function maintained: Monitor labs and assess for signs and symptoms of volume excess or deficit  Taken 12/26/2022 2040  Hemodynamic stability and optimal renal function maintained: Monitor labs and assess for signs and symptoms of volume excess or deficit     Problem: Chronic Conditions and Co-morbidities  Goal: Patient's chronic conditions and co-morbidity symptoms are monitored and maintained or improved  12/27/2022 1036 by Indu Moran RN  Outcome: Progressing  4 H Guerrero Sourav (Taken 12/27/2022 0815)  Care Plan - Patient's Chronic Conditions and Co-Morbidity Symptoms are Monitored and Maintained or Improved: Monitor and assess patient's chronic conditions and comorbid symptoms for stability, deterioration, or improvement  12/27/2022 0228 by Ling Serna RN  Outcome: Progressing  Flowsheets  Taken 12/27/2022 0040  Care Plan - Patient's Chronic Conditions and Co-Morbidity Symptoms are Monitored and Maintained or Improved: Monitor and assess patient's chronic conditions and comorbid symptoms for stability, deterioration, or improvement  Taken 12/26/2022 2040  Care Plan - Patient's Chronic Conditions and Co-Morbidity Symptoms are Monitored and Maintained or Improved: Monitor and assess patient's chronic conditions and comorbid symptoms for stability, deterioration, or improvement     Problem: Nutrition Deficit:  Goal: Optimize nutritional status  12/27/2022 1036 by Julia Ingram RN  Outcome: Progressing  12/27/2022 0228 by Mallory Redman RN  Outcome: Progressing  Flowsheets (Taken 12/26/2022 1345 by Awa Rider, RD, LD)  Nutrient intake appropriate for improving, restoring, or maintaining nutritional needs:   Assess nutritional status and recommend course of action   Monitor oral intake, labs, and treatment plans   Recommend appropriate diets, oral nutritional supplements, and vitamin/mineral supplements     Problem: Skin/Tissue Integrity - Adult  Goal: Skin integrity remains intact  12/27/2022 1036 by Julia Ingram RN  Outcome: Progressing  Flowsheets (Taken 12/27/2022 1035)  Skin Integrity Remains Intact:   Monitor for areas of redness and/or skin breakdown   Assess vascular access sites hourly  12/27/2022 0228 by Mallory Redman RN  Outcome: Progressing  Flowsheets  Taken 12/27/2022 0040  Skin Integrity Remains Intact: Monitor for areas of redness and/or skin breakdown  Taken 12/26/2022 2040  Skin Integrity Remains Intact: Monitor for areas of redness and/or skin breakdown     Problem: Musculoskeletal - Adult  Goal: Return mobility to safest level of function  12/27/2022 1036 by Julia Ingram RN  Outcome: Progressing  Flowsheets (Taken 12/27/2022 0815)  Return Mobility to Safest Level of Function: Assess patient stability and activity tolerance for standing, transferring and ambulating with or without assistive devices  12/27/2022 0228 by Mallory Redman RN  Outcome: Progressing  Flowsheets  Taken 12/27/2022 0040  Return Mobility to Safest Level of Function: Assess patient stability and activity tolerance for standing, transferring and ambulating with or without assistive devices  Taken 12/26/2022 2040  Return Mobility to Safest Level of Function: Assess patient stability and activity tolerance for standing, transferring and ambulating with or without assistive devices

## 2022-12-27 NOTE — PROGRESS NOTES
[facility]   Nephrology progress note  12/27/2022, 1:00 PM      Pt Name:    Ana Ford  MRN:     [id number]     YOB: 1955  Admit Date:    12/16/2022 10:35 AM    Chief Complaint: Nephrology following for acute kidney injury/CKD    Subjective:  Patient seen and examined  No chest pain or shortness of breath  Some urine output noted    Objective:  24HR INTAKE/OUTPUT:    Intake/Output Summary (Last 24 hours) at 12/27/2022 1300  Last data filed at 12/26/2022 2052  Gross per 24 hour   Intake 520 ml   Output 1900 ml   Net -1380 ml        Admission weight: 114 lb (51.7 kg)  Wt Readings from Last 3 Encounters:   12/27/22 118 lb (53.5 kg)   12/16/22 114 lb (51.7 kg)   12/12/22 116 lb (52.6 kg)        Vitals :   Vitals:    12/27/22 0417 12/27/22 0558 12/27/22 0800 12/27/22 1123   BP: (!) 132/92  131/62 136/65   Pulse: 91  95 96   Resp: 17   18   Temp: 99 °F (37.2 °C)  99.2 °F (37.3 °C) 98.6 °F (37 °C)   TempSrc: Oral  Oral Oral   SpO2: 100%  100% 99%   Weight: 118 lb (53.5 kg) 118 lb (53.5 kg)     Height:           Physical examination  General Appearance:  Well developed.  No distress  Mouth/Throat:  Oral mucosa moist  Neck:  Supple, no JVD  Lungs:  Breath sounds: clear  Heart[de-identified]  S1,S2 heard  Abdomen:  Soft, non - tender  Musculoskeletal:  Edema -no edema noted in all 4 extremities well  Some tremor noted getting better    Medications:  Infusion:    sodium chloride      [Held by provider] sodium chloride Stopped (12/23/22 1525)    sodium chloride       Meds:    epoetin mumtaz-epbx  2,000 Units SubCUTAneous Once per day on Mon Wed Fri    And    epoetin mumtaz-epbx  3,000 Units SubCUTAneous Once per day on Mon Wed Fri    [Held by provider] magnesium oxide  400 mg Oral BID    calcium replacement protocol   Other RX Placeholder    calcitRIOL  0.25 mcg Oral Once per day on Sun Tue Thu Sat    docusate sodium  100 mg Oral Daily    senna  1 tablet Oral Nightly    [Held by provider] potassium bicarb-citric acid  40 mEq Oral Daily    amLODIPine  5 mg Oral Daily    [Held by provider] aspirin  81 mg Oral Daily    buPROPion  200 mg Oral BID    [Held by provider] clopidogrel  75 mg Oral Daily    folic acid  262 mcg Oral Daily    montelukast  10 mg Oral Daily    atorvastatin  10 mg Oral Daily    traZODone  50 mg Oral Nightly    polyethylene glycol  17 g Oral Daily    sodium chloride flush  10 mL IntraVENous 2 times per day    [Held by provider] heparin (porcine)  5,000 Units SubCUTAneous 3 times per day       Lab Data :  CBC:   Recent Labs     12/25/22 0450 12/26/22  0704 12/27/22  0438   WBC 9.9 8.3 9.2   HGB 8.4* 7.5* 8.8*   HCT 26.3* 23.8* 27.9*    170 239       CMP:  Recent Labs     12/25/22 0450 12/26/22  0704 12/27/22  0438    141 140   K 4.2 4.2 4.2    106 103   CO2 23 23 24   BUN 23* 29* 15   CREATININE 4.5* 5.7* 3.3*   GLUCOSE 88 70 81   CALCIUM 7.5* 6.8* 8.5       Hepatic:   Recent Labs     12/27/22  0438   LABALBU 3.4*   AST 20   ALT 20   BILITOT 0.3   ALKPHOS 153*         Assessment and Plan:  Renal -acute kidney injury most likely secondary to severe volume depletion,  It looked extremely dry, received IV fluids without much improvement so dialysis has been initiated  Serologic work-up negative, except LUCINA. Lupus work-up is pending at this time  We will obtain a renal biopsy, switch to a tunneled dialysis catheter and look for outpatient dialysis unit placement  Monitor BMP no acute need for dialysis today  Electrolytes -within normal limits  Acid-base status-metabolic acidosis improved with dialysis  Hypocalcemia corrected calcium reasonable should be corrected with dialysis as well to some degree   CKD stage IV with baseline creatinine 1.8-2.0  Hx of fibromyalgia  Essential hypertension stable  Meds reviewed and discussed with patient, , primary team and daughter in detail explained to them the risks of the tunneled dialysis catheter and biopsy.     [facility], MD  [facility]    This report has been created using voice recognition software.  It may contain minor errors which are inherent in voice recognition technology

## 2022-12-27 NOTE — PROGRESS NOTES
[address] [address]  INPATIENT PHYSICAL THERAPY  DAILY NOTE  [facility] RENAL TELEMETRY 6K - [id number]    Time In: 6126  Time Out: 1409  Timed Code Treatment Minutes: 23 Minutes  Minutes: 23          Date: 2022  Patient Name: Wilberto Solis,  Gender:  female        MRN: [id number]  : 1955  (69 y.o.)     Referring Practitioner: CARLOS MANUEL Ladd CNP  Diagnosis: dehydration  Additional Pertinent Hx: Per EMR \"Deloris Kyle is a 69 y.o. female who presents to the emergency department for evaluation of, vomiting  Patient presents to the emergency department after being seen in the office because of 4 days of multiple episodes of nausea, emesis, not tolerating oral intake, lower abdominal pain, fatigue, feeling dehydrated; patient was diagnosed with sinusitis being prescribed with doxycycline some days ago, symptoms referred at that time were rhinorrhea, cough, mild shortness of breath; symptoms did not improve and were joint with nausea emesis and abdominal pain. Symptoms low urinary output, dark urine. Was concerned about kidney failure due to history of CKD. \"     Prior Level of Function:  Lives With: Spouse  Type of Home: House  Home Layout: One level  Home Access: Stairs to enter without rails  Entrance Stairs - Number of Steps: 2 MARIANA  Home Equipment: Radha Mei   Bathroom Shower/Tub: Tub/Shower unit  Bathroom Toilet: Standard  Bathroom Equipment: Shower chair    ADL Assistance: Independent  Homemaking Assistance: Needs assistance  Ambulation Assistance: Independent  Transfer Assistance: Independent  Active : Yes (short distances)  IADL Comments:  completes most cooking and cleaning tasks  Additional Comments: pt was intermittently using a walker prior to feeling weak and sick, has been using it more in the last 2 weeks.     Restrictions/Precautions:  Restrictions/Precautions: General Precautions, Fall Risk  Position Activity Restriction  Other position/activity restrictions: tremors     SUBJECTIVE: RN approved session. Pts spouse initially present but left room during tx. Pt in recliner upon arrival and agrees to therapy. Pt impulsive and fidgeting throughout. Mumbles throughout session - difficult to  understand. Pt c/o nausea throughout session, did report need to go to bathroom then once up states shes nauseous and wants to lay down and declines need for bathroom. Pt appears confused throughout session. PAIN: skin irritation from port on R side chest/neck    Vitals: Oxygen: 92-94% on room air  Heart Rate: 104-115    OBJECTIVE:  Bed Mobility:  Sit to Supine: Stand By Assistance, with head of bed raised   Scooting: Stand By Assistance    Transfers:  Sit to Stand: Minimal Assistance, cues for hand placement, with verbal cues, cues fpor hand placement with decreased carryover pt attempting to stand imusively mult times until successful. Pt unsteady and demos retro lean/imbalance  Stand to [facility], cues for hand placement, with verbal cues, impulsive to sit  Pts 4ww rolling forward with attempts to stand- brakes not working when clicked down, only working when constantly squeezed and R back wheel is lifted off of the floor due to imbalance in frame    Ambulation:  [address], with cues for safety, with verbal cues , with increased time for completion  Distance: 12ft  Surface: Level Tile  Device:4 Wheeled Walker  Gait Deviations: Forward Flexed Posture, Slow Anna, Decreased Step Length Bilaterally, Lean to Right, Lean to Left, Decreased Gait Speed, Decreased Heel Strike Bilaterally, Mild Path Deviations, Unsteady Gait, and Decreased Terminal Knee Extension    Exercise:  Patient was guided in 1 set(s) 10 reps of exercise to both lower extremities. General LE therex in recliner. Pt demos decreased coordination impulsively scooting self roward and fback, pt fidgeting throughout session .   Exercises were completed for increased independence with functional mobility. Functional Outcome Measures: Completed  AM-PAC Inpatient Mobility without Stair Climbing Raw Score : 15  AM-PAC Inpatient without Stair Climbing T-Scale Score : 43.03    ASSESSMENT:  Assessment: Patient progressing toward established goals. Activity Tolerance:  Patient tolerance of  treatment: fair. Limtied by nausea, pt demos decreased cognition/ability to follow directions and pt is impulsive. Pt demos decreased strength, endurance, and independence with mobility. Pt unsteady on feet and requires hands on assist for safety with mobility. Pt will benefit from cont PT at this time to ensure safety, to decrease the risk for falls and to return to OF. Equipment Recommendations:Equipment Needed: Yes  Mobility Devices: Walker  Other: may need RW, pts 4ww is lopsided and brakes not functioning correctly- unsafe for use. cont to assess RW vs 4ww  Discharge Recommendations: cont to assess pending progress, Subacute/Skilled Nursing Facility vs 24 hour assistance or supervision & continued PT at discharge  Plan: Current Treatment Recommendations: Strengthening, Balance training, Gait training, Stair training, Functional mobility training, Transfer training, Neuromuscular re-education, Endurance training, Equipment evaluation, education, & procurement, Patient/Caregiver education & training, Safety education & training, Therapeutic activities, Home exercise program  General Plan:  (5x GM)    Patient Education  Patient Education: Plan of Care, Bed Mobility, Transfers, Gait, Verbal Exercise Instruction    Goals:  Patient Goals : therapy services HH vs outpatient  Short Term Goals  Time Frame for Short Term Goals: by discharge  Short Term Goal 1: Pt will amb for >100 feet with S with RW for support to return home safely. Short Term Goal 2: Pt will demo sit to/from stand transfers with RW for support with IND to return home safely.   Short Term Goal 3: Pt will demo SBA for stair negotiation with rail for support as needed to return home safely. Short Term Goal 4: Pt will demo IND with bed mobility tasks with bed flat to return home safely. Long Term Goals  Time Frame for Long Term Goals : NA due to short ELOS    Following session, patient left in safe position with all fall risk precautions in place.

## 2022-12-27 NOTE — PLAN OF CARE
Problem: Discharge Planning  Goal: Discharge to home or other facility with appropriate resources  Outcome: Progressing  Flowsheets  Taken 12/27/2022 0040  Discharge to home or other facility with appropriate resources: Identify barriers to discharge with patient and caregiver  Taken 12/26/2022 2040  Discharge to home or other facility with appropriate resources: Identify barriers to discharge with patient and caregiver     Problem: Pain  Goal: Verbalizes/displays adequate comfort level or baseline comfort level  Recent Flowsheet Documentation  Taken 12/26/2022 2335 by Gabriel Souza RN  Verbalizes/displays adequate comfort level or baseline comfort level: Encourage patient to monitor pain and request assistance  Taken 12/26/2022 2040 by Gabriel Souza RN  Verbalizes/displays adequate comfort level or baseline comfort level: Encourage patient to monitor pain and request assistance     Problem: Skin/Tissue Integrity  Goal: Absence of new skin breakdown  Description: 1. Monitor for areas of redness and/or skin breakdown  2. Assess vascular access sites hourly  3. Every 4-6 hours minimum:  Change oxygen saturation probe site  4. Every 4-6 hours:  If on nasal continuous positive airway pressure, respiratory therapy assess nares and determine need for appliance change or resting period.   Outcome: Progressing     Problem: Safety - Adult  Goal: Free from fall injury  Outcome: Progressing  Flowsheets (Taken 12/26/2022 2040)  Free From Fall Injury: Instruct family/caregiver on patient safety     Problem: ABCDS Injury Assessment  Goal: Absence of physical injury  Outcome: Progressing  Flowsheets (Taken 12/26/2022 2040)  Absence of Physical Injury: Implement safety measures based on patient assessment     Problem: Genitourinary - Adult  Goal: Absence of urinary retention  Recent Flowsheet Documentation  Taken 12/27/2022 0040 by Gabriel Souza RN  Absence of urinary retention: Assess patients ability to void and empty bladder  Taken 12/26/2022 2040 by Roselia Cast RN  Absence of urinary retention: Assess patients ability to void and empty bladder     Problem: Metabolic/Fluid and Electrolytes - Adult  Goal: Electrolytes maintained within normal limits  Outcome: Progressing  Flowsheets  Taken 12/27/2022 0040  Electrolytes maintained within normal limits: Monitor labs and assess patient for signs and symptoms of electrolyte imbalances  Taken 12/26/2022 2040  Electrolytes maintained within normal limits: Monitor labs and assess patient for signs and symptoms of electrolyte imbalances     Problem: Metabolic/Fluid and Electrolytes - Adult  Goal: Hemodynamic stability and optimal renal function maintained  Outcome: Progressing  Flowsheets  Taken 12/27/2022 0040  Hemodynamic stability and optimal renal function maintained: Monitor labs and assess for signs and symptoms of volume excess or deficit  Taken 12/26/2022 2040  Hemodynamic stability and optimal renal function maintained: Monitor labs and assess for signs and symptoms of volume excess or deficit     Problem: Skin/Tissue Integrity - Adult  Goal: Skin integrity remains intact  Outcome: Progressing  Flowsheets  Taken 12/27/2022 0040  Skin Integrity Remains Intact: Monitor for areas of redness and/or skin breakdown  Taken 12/26/2022 2040  Skin Integrity Remains Intact: Monitor for areas of redness and/or skin breakdown     Problem: Musculoskeletal - Adult  Goal: Return mobility to safest level of function  Outcome: Progressing  Flowsheets  Taken 12/27/2022 0040  Return Mobility to Safest Level of Function: Assess patient stability and activity tolerance for standing, transferring and ambulating with or without assistive devices  Taken 12/26/2022 2040  Return Mobility to Safest Level of Function: Assess patient stability and activity tolerance for standing, transferring and ambulating with or without assistive devices     Problem: Chronic Conditions and Co-morbidities  Goal: Patient's chronic conditions and co-morbidity symptoms are monitored and maintained or improved  Outcome: Progressing  Flowsheets  Taken 12/27/2022 0040  Care Plan - Patient's Chronic Conditions and Co-Morbidity Symptoms are Monitored and Maintained or Improved: Monitor and assess patient's chronic conditions and comorbid symptoms for stability, deterioration, or improvement  Taken 12/26/2022 2040  Care Plan - Patient's Chronic Conditions and Co-Morbidity Symptoms are Monitored and Maintained or Improved: Monitor and assess patient's chronic conditions and comorbid symptoms for stability, deterioration, or improvement     Problem: Nutrition Deficit:  Goal: Optimize nutritional status  Outcome: Progressing  Flowsheets (Taken 12/26/2022 1345 by Eloise Foss RD, LD)  Nutrient intake appropriate for improving, restoring, or maintaining nutritional needs:   Assess nutritional status and recommend course of action   Monitor oral intake, labs, and treatment plans   Recommend appropriate diets, oral nutritional supplements, and vitamin/mineral supplements

## 2022-12-27 NOTE — PROGRESS NOTES
The patient is scheduled for a Tunn dialysis catheter for Wednesday Dec 28th at 1100am.  Please keep the patient npo starting at 700am.

## 2022-12-27 NOTE — FLOWSHEET NOTE
12/27/22 0958   Safe Environment   Safety Measures   (VIrtual RN rounds complete)   PT sitting upright in bed, No needs identified at this time. No acute distress noted.

## 2022-12-28 ENCOUNTER — APPOINTMENT (OUTPATIENT)
Dept: CT IMAGING | Age: 67
End: 2022-12-28
Payer: MEDICARE

## 2022-12-28 ENCOUNTER — APPOINTMENT (OUTPATIENT)
Dept: INTERVENTIONAL RADIOLOGY/VASCULAR | Age: 67
End: 2022-12-28
Payer: MEDICARE

## 2022-12-28 LAB
ANION GAP SERPL CALCULATED.3IONS-SCNC: 12 MEQ/L (ref 8–16)
APTT: 27.4 SECONDS (ref 22–38)
BASOPHILS # BLD: 0.8 %
BASOPHILS ABSOLUTE: 0.1 THOU/MM3 (ref 0–0.1)
BUN BLDV-MCNC: 26 MG/DL (ref 7–22)
CALCIUM SERPL-MCNC: 8.2 MG/DL (ref 8.5–10.5)
CHLORIDE BLD-SCNC: 104 MEQ/L (ref 98–111)
CO2: 25 MEQ/L (ref 23–33)
CREAT SERPL-MCNC: 4.9 MG/DL (ref 0.4–1.2)
EOSINOPHIL # BLD: 1.1 %
EOSINOPHILS ABSOLUTE: 0.1 THOU/MM3 (ref 0–0.4)
ERYTHROCYTE [DISTWIDTH] IN BLOOD BY AUTOMATED COUNT: 16.8 % (ref 11.5–14.5)
ERYTHROCYTE [DISTWIDTH] IN BLOOD BY AUTOMATED COUNT: 55.1 FL (ref 35–45)
F-ACTIN AB IGG: 6 UNITS (ref 0–19)
GFR SERPL CREATININE-BSD FRML MDRD: 9 ML/MIN/1.73M2
GLUCOSE BLD-MCNC: 76 MG/DL (ref 70–108)
HCT VFR BLD CALC: 27.2 % (ref 37–47)
HEMOGLOBIN: 8.6 GM/DL (ref 12–16)
IMMATURE GRANS (ABS): 0.36 THOU/MM3 (ref 0–0.07)
IMMATURE GRANULOCYTES: 3.9 %
INR BLD: 0.92 (ref 0.85–1.13)
LYMPHOCYTES # BLD: 25.5 %
LYMPHOCYTES ABSOLUTE: 2.3 THOU/MM3 (ref 1–4.8)
MCH RBC QN AUTO: 29.1 PG (ref 26–33)
MCHC RBC AUTO-ENTMCNC: 31.6 GM/DL (ref 32.2–35.5)
MCV RBC AUTO: 91.9 FL (ref 81–99)
MONOCYTES # BLD: 10.3 %
MONOCYTES ABSOLUTE: 0.9 THOU/MM3 (ref 0.4–1.3)
NUCLEATED RED BLOOD CELLS: 0 /100 WBC
PLATELET # BLD: 265 THOU/MM3 (ref 130–400)
PMV BLD AUTO: 9.8 FL (ref 9.4–12.4)
POTASSIUM REFLEX MAGNESIUM: 4.2 MEQ/L (ref 3.5–5.2)
RBC # BLD: 2.96 MILL/MM3 (ref 4.2–5.4)
SEG NEUTROPHILS: 58.4 %
SEGMENTED NEUTROPHILS ABSOLUTE COUNT: 5.4 THOU/MM3 (ref 1.8–7.7)
SODIUM BLD-SCNC: 141 MEQ/L (ref 135–145)
WBC # BLD: 9.2 THOU/MM3 (ref 4.8–10.8)

## 2022-12-28 PROCEDURE — 0TB03ZX EXCISION OF RIGHT KIDNEY, PERCUTANEOUS APPROACH, DIAGNOSTIC: ICD-10-PCS | Performed by: INTERNAL MEDICINE

## 2022-12-28 PROCEDURE — 02HV33Z INSERTION OF INFUSION DEVICE INTO SUPERIOR VENA CAVA, PERCUTANEOUS APPROACH: ICD-10-PCS | Performed by: INTERNAL MEDICINE

## 2022-12-28 PROCEDURE — 6370000000 HC RX 637 (ALT 250 FOR IP): Performed by: PHYSICIAN ASSISTANT

## 2022-12-28 PROCEDURE — 2500000003 HC RX 250 WO HCPCS: Performed by: RADIOLOGY

## 2022-12-28 PROCEDURE — 77001 FLUOROGUIDE FOR VEIN DEVICE: CPT

## 2022-12-28 PROCEDURE — 6360000002 HC RX W HCPCS: Performed by: INTERNAL MEDICINE

## 2022-12-28 PROCEDURE — 6360000002 HC RX W HCPCS: Performed by: PHYSICIAN ASSISTANT

## 2022-12-28 PROCEDURE — 2709999900 IR FLUORO GUIDED NEEDLE PLACEMENT

## 2022-12-28 PROCEDURE — 0JH63XZ INSERTION OF TUNNELED VASCULAR ACCESS DEVICE INTO CHEST SUBCUTANEOUS TISSUE AND FASCIA, PERCUTANEOUS APPROACH: ICD-10-PCS | Performed by: INTERNAL MEDICINE

## 2022-12-28 PROCEDURE — 90935 HEMODIALYSIS ONE EVALUATION: CPT

## 2022-12-28 PROCEDURE — 2580000003 HC RX 258: Performed by: PHYSICIAN ASSISTANT

## 2022-12-28 PROCEDURE — 6370000000 HC RX 637 (ALT 250 FOR IP): Performed by: RADIOLOGY

## 2022-12-28 PROCEDURE — 6360000002 HC RX W HCPCS: Performed by: RADIOLOGY

## 2022-12-28 PROCEDURE — 02PY33Z REMOVAL OF INFUSION DEVICE FROM GREAT VESSEL, PERCUTANEOUS APPROACH: ICD-10-PCS | Performed by: INTERNAL MEDICINE

## 2022-12-28 PROCEDURE — 6370000000 HC RX 637 (ALT 250 FOR IP)

## 2022-12-28 PROCEDURE — 1200000003 HC TELEMETRY R&B

## 2022-12-28 PROCEDURE — 77012 CT SCAN FOR NEEDLE BIOPSY: CPT

## 2022-12-28 PROCEDURE — 50200 RENAL BIOPSY PERQ: CPT

## 2022-12-28 PROCEDURE — 2580000003 HC RX 258: Performed by: RADIOLOGY

## 2022-12-28 PROCEDURE — 36558 INSERT TUNNELED CV CATH: CPT

## 2022-12-28 RX ORDER — MIDAZOLAM HYDROCHLORIDE 1 MG/ML
INJECTION INTRAMUSCULAR; INTRAVENOUS
Status: COMPLETED | OUTPATIENT
Start: 2022-12-28 | End: 2022-12-28

## 2022-12-28 RX ORDER — BACITRACIN, NEOMYCIN, POLYMYXIN B 400; 3.5; 5 [USP'U]/G; MG/G; [USP'U]/G
OINTMENT TOPICAL ONCE
Status: COMPLETED | OUTPATIENT
Start: 2022-12-28 | End: 2022-12-28

## 2022-12-28 RX ORDER — BACITRACIN, NEOMYCIN, POLYMYXIN B 400; 3.5; 5 [USP'U]/G; MG/G; [USP'U]/G
OINTMENT TOPICAL
Status: COMPLETED | OUTPATIENT
Start: 2022-12-28 | End: 2022-12-28

## 2022-12-28 RX ADMIN — AMLODIPINE BESYLATE 5 MG: 5 TABLET ORAL at 10:44

## 2022-12-28 RX ADMIN — TRAZODONE HYDROCHLORIDE 50 MG: 50 TABLET ORAL at 21:03

## 2022-12-28 RX ADMIN — DOCUSATE SODIUM 100 MG: 100 CAPSULE, LIQUID FILLED ORAL at 10:48

## 2022-12-28 RX ADMIN — HYDROMORPHONE HYDROCHLORIDE 1 MG: 1 INJECTION, SOLUTION INTRAMUSCULAR; INTRAVENOUS; SUBCUTANEOUS at 08:21

## 2022-12-28 RX ADMIN — ATORVASTATIN CALCIUM 10 MG: 10 TABLET, FILM COATED ORAL at 10:44

## 2022-12-28 RX ADMIN — SENNOSIDES 8.6 MG: 8.6 TABLET, COATED ORAL at 21:03

## 2022-12-28 RX ADMIN — EPOETIN ALFA-EPBX 3000 UNITS: 3000 INJECTION, SOLUTION INTRAVENOUS; SUBCUTANEOUS at 10:49

## 2022-12-28 RX ADMIN — EPOETIN ALFA-EPBX 2000 UNITS: 2000 INJECTION, SOLUTION INTRAVENOUS; SUBCUTANEOUS at 10:49

## 2022-12-28 RX ADMIN — BACITRACIN ZINC, NEOMYCIN SULFATE, AND POLYMYXIN B SULFATE 1 APPLICATOR: 400; 3.5; 5 OINTMENT TOPICAL at 09:50

## 2022-12-28 RX ADMIN — BUPROPION HYDROCHLORIDE 200 MG: 100 TABLET, FILM COATED, EXTENDED RELEASE ORAL at 10:44

## 2022-12-28 RX ADMIN — POLYETHYLENE GLYCOL 3350 17 G: 17 POWDER, FOR SOLUTION ORAL at 10:48

## 2022-12-28 RX ADMIN — FOLIC ACID 500 MCG: 1 TABLET ORAL at 10:44

## 2022-12-28 RX ADMIN — BACITRACIN ZINC, NEOMYCIN, POLYMYXIN B 0.9 G: 400; 3.5; 5 OINTMENT TOPICAL at 08:39

## 2022-12-28 RX ADMIN — MIDAZOLAM 1 MG: 1 INJECTION INTRAMUSCULAR; INTRAVENOUS at 08:21

## 2022-12-28 RX ADMIN — BUPROPION HYDROCHLORIDE 200 MG: 100 TABLET, FILM COATED, EXTENDED RELEASE ORAL at 21:00

## 2022-12-28 RX ADMIN — CLINDAMYCIN PHOSPHATE 600 MG: 600 INJECTION, SOLUTION INTRAVENOUS at 08:27

## 2022-12-28 RX ADMIN — MIDAZOLAM 1 MG: 1 INJECTION INTRAMUSCULAR; INTRAVENOUS at 09:14

## 2022-12-28 RX ADMIN — CETIRIZINE HYDROCHLORIDE 5 MG: 5 TABLET ORAL at 10:44

## 2022-12-28 RX ADMIN — SODIUM CHLORIDE: 4.5 INJECTION, SOLUTION INTRAVENOUS at 07:32

## 2022-12-28 RX ADMIN — ONDANSETRON 4 MG: 2 INJECTION INTRAMUSCULAR; INTRAVENOUS at 18:57

## 2022-12-28 RX ADMIN — HYDROMORPHONE HYDROCHLORIDE 0.5 MG: 1 INJECTION, SOLUTION INTRAMUSCULAR; INTRAVENOUS; SUBCUTANEOUS at 09:14

## 2022-12-28 RX ADMIN — SODIUM CHLORIDE, PRESERVATIVE FREE 10 ML: 5 INJECTION INTRAVENOUS at 21:04

## 2022-12-28 RX ADMIN — MONTELUKAST SODIUM 10 MG: 10 TABLET ORAL at 20:59

## 2022-12-28 NOTE — H&P
[address] [address]  Sedation/Analgesia History & Physical    Pt Name: yT Rojas  MRN: [id number]  YOB: 1955  Provider Performing Procedure: Alex Frankel MD, MD  Primary Care Physician: Bella Delgadillo MD    Formulation and discussion of sedation / procedure plans, risks, benefits, side effects and alternatives with patient and/or responsible adult completed. PRE-PROCEDURE   DNR-CCA/DNR-CC []Yes [x]No  Brief History/Pre-Procedure Diagnosis: Renal failure           MEDICAL HISTORY  []CAD/Valve  []Liver Disease  []Lung Disease []Diabetes  []Hypertension []Renal Disease  []Additional information:       has a past medical history of Allergic rhinitis, Anxiety, CVA (cerebral infarction), Depression, Fibromyalgia, Headache(784.0), Hyperlipidemia, Hypertension, Irritable bowel syndrome, Kidney failure, Unspecified cerebral artery occlusion with cerebral infarction, and Unspecified sleep apnea. SURGICAL HISTORY   has a past surgical history that includes sinus surgery (10 years ago); Hemorrhoid surgery (unsure); Neck surgery (18  years ago); eye surgery; Endoscopy, colon, diagnostic (unsure); Colonoscopy (2012); Tubal ligation; Carpal tunnel release (Right); Hysterectomy; and Ovary removal (Bilateral).   Additional information:       ALLERGIES   Allergies as of 12/16/2022 - Fully Reviewed 12/16/2022   Allergen Reaction Noted    Pioglitazone Nausea And Vomiting 04/09/2018    Amoxicillin  02/18/2022    Amoxicillin-pot clavulanate Diarrhea 10/17/2014    Other Itching 09/18/2020    Ciprofloxacin  11/23/2012    Sulfa antibiotics Rash and Other (See Comments) 10/03/2014     Additional information:       MEDICATIONS   Coumadin Use Last 5 Days [x]No []Yes  Antiplatelet drug therapy use last 5 days  [x]No []Yes  Other anticoagulant use last 5 days  []No []Yes    Current Facility-Administered Medications:     0.45 % sodium chloride infusion, , IntraVENous, Continuous, Anirudh Capone MD, Last Rate: 20 mL/hr at 12/28/22 0732, New Bag at 12/28/22 0732    HYDROmorphone (DILAUDID) injection 1 mg, 1 mg, IntraVENous, Once, Juan David Sotelo MD    midazolam (VERSED) injection 1 mg, 1 mg, IntraVENous, Once, Juan David Sotelo MD    clindamycin (CLEOCIN) 600 mg in dextrose 5 % 50 mL IVPB, 600 mg, IntraVENous, Once, Juan David Sotelo MD    0.9 % sodium chloride infusion, , IntraVENous, PRN, Robyn Vance MD    epoetin mumtaz-epbx (RETACRIT) injection 2,000 Units, 2,000 Units, SubCUTAneous, Once per day on Mon Wed Fri, 2,000 Units at 12/26/22 0754 **AND** epoetin mumtaz-epbx (RETACRIT) injection 3,000 Units, 3,000 Units, SubCUTAneous, Once per day on Mon Wed Fri, Benji Reddy MD, 3,000 Units at 12/26/22 0755    promethazine (PHENERGAN) injection 6.25 mg, 6.25 mg, IntraMUSCular, Q6H PRN, Renny Nobles APRN - CNP, 6.25 mg at 12/22/22 2107    [Held by provider] magnesium oxide (MAG-OX) tablet 400 mg, 400 mg, Oral, BID, Renny Nobles, APRN - CNP, 400 mg at 12/19/22 1945    [Held by provider] 0.45 % sodium chloride infusion, , IntraVENous, Continuous, Benji Reddy MD, Stopped at 12/23/22 1525    calcium replacement protocol, , Other, RX Placeholder, Renny Nobles, APRN - CNP    calcitRIOL (ROCALTROL) capsule 0.25 mcg, 0.25 mcg, Oral, Once per day on Sun Tue Thu Sat, Kath Alcaraz DO, 0.25 mcg at 12/27/22 0678    docusate sodium (COLACE) capsule 100 mg, 100 mg, Oral, Daily, Renny Nobles APRN - CNP, 100 mg at 12/27/22 9570    senna (SENOKOT) tablet 8.6 mg, 1 tablet, Oral, Nightly, Renny Nobles APRN - CNP, 8.6 mg at 12/27/22 2050    [Held by provider] potassium bicarb-citric acid (EFFER-K) effervescent tablet 40 mEq, 40 mEq, Oral, Daily, CARLOS MANUEL Lobo CNP, 40 mEq at 12/19/22 0820    amLODIPine (NORVASC) tablet 5 mg, 5 mg, Oral, Daily, Elen Shipman PA-C, 5 mg at 12/27/22 0644    [Held by provider] aspirin EC tablet 81 mg, 81 mg, Oral, Daily, Elen Shipman PA-C, 81 mg at 12/20/22 2102    buPROPion [facility]) extended release tablet 200 mg, 200 mg, Oral, BID, DENISSE Barrett-C, 200 mg at 12/27/22 2050    chlordiazePOXIDE-clidinium (LIBRAX) 5-2.5 MG per capsule 1 capsule, 1 capsule, Oral, BID PRN, Rossi Santos PA-C    [Held by provider] clopidogrel (PLAVIX) tablet 75 mg, 75 mg, Oral, Daily, DENISSE Barrett-C, 75 mg at 64/68/15 3953    folic acid (FOLVITE) tablet 500 mcg, 500 mcg, Oral, Daily, DENISSE Barrett-C, 500 mcg at 12/27/22 0814    montelukast (SINGULAIR) tablet 10 mg, 10 mg, Oral, Daily, DENISSE Barrett-C, 10 mg at 12/27/22 2050    atorvastatin (LIPITOR) tablet 10 mg, 10 mg, Oral, Daily, DENISSE Barrett-C, 10 mg at 12/27/22 0814    traZODone (DESYREL) tablet 50 mg, 50 mg, Oral, Nightly, DENISSE Barrett-C, 50 mg at 12/27/22 2050    fluticasone (FLONASE) 50 MCG/ACT nasal spray 1 spray, 1 spray, Each Nostril, Daily PRN, DENISSE Barrett-C, 1 spray at 12/19/22 1635    simethicone (MYLICON) chewable tablet 80 mg, 80 mg, Oral, Q6H PRN, Elen Shipman PA-C    polyethylene glycol (GLYCOLAX) packet 17 g, 17 g, Oral, Daily, Elen Shipman PA-C, 17 g at 12/27/22 0814    sodium chloride flush 0.9 % injection 10 mL, 10 mL, IntraVENous, 2 times per day, Elen Shipman PA-C, 10 mL at 12/27/22 2050    sodium chloride flush 0.9 % injection 10 mL, 10 mL, IntraVENous, PRN, Elen Shipman PA-C    0.9 % sodium chloride infusion, , IntraVENous, PRN, Elen Shipman PA-C    [Held by provider] heparin (porcine) injection 5,000 Units, 5,000 Units, SubCUTAneous, 3 times per day, Rossi Santos PA-C, 5,000 Units at 12/21/22 0606    ondansetron (ZOFRAN-ODT) disintegrating tablet 4 mg, 4 mg, Oral, Q8H PRN, 4 mg at 12/27/22 1157 **OR** ondansetron (ZOFRAN) injection 4 mg, 4 mg, IntraVENous, Q6H PRN, Elen Shipman PA-C, 4 mg at 12/27/22 1523    acetaminophen (TYLENOL) tablet 650 mg, 650 mg, Oral, Q6H PRN, 650 mg at 12/26/22 0253 **OR** acetaminophen (TYLENOL) suppository 650 mg, 650 mg, Rectal, Q6H PRN, DENISSE Barrett-MICHELINE    dextromethorphan-guaiFENesin (MUCINEX DM)  MG per extended release tablet 1 tablet, 1 tablet, Oral, BID PRN, Elen Shipman PA-C, 1 tablet at 12/26/22 0752    cetirizine (ZYRTEC) tablet 5 mg, 5 mg, Oral, Nightly PRN, DENISSE Barrett-C, 5 mg at 12/21/22 2014    hydrALAZINE (APRESOLINE) injection 5 mg, 5 mg, IntraVENous, Q6H PRN, DENISSE Barrett-MICHELINE, 5 mg at 12/22/22 2107  Prior to Admission medications    Medication Sig Start Date End Date Taking?  Authorizing Provider   cetirizine (ZYRTEC) 10 MG tablet Take 10 mg by mouth daily as needed for Allergies   Yes Historical Provider, MD   docusate sodium (COLACE) 100 MG capsule Take 100 mg by mouth daily as needed for Constipation   Yes Historical Provider, MD   traZODone (DESYREL) 50 MG tablet TAKE 1 TABLET NIGHTLY 12/16/22   CARLOS MANUEL Corado CNP   montelukast (SINGULAIR) 10 MG tablet Take 1 tablet by mouth daily 12/15/22   Stefano Barker MD   buPROPion [facility] SR) 200 MG extended release tablet Take 1 tablet by mouth 2 times daily 10/12/22   Stefano Barker MD   simvastatin (ZOCOR) 20 MG tablet TAKE 1 TABLET BY MOUTH DAILY 4/12/22   Stefano Barker MD   clopidogrel (PLAVIX) 75 MG tablet TAKE 1 TABLET BY MOUTH DAILY 3/14/22   Stefano Barker MD   chlordiazePOXIDE-clidinium (LIBRAX) 5-2.5 MG per capsule TAKE 1 CAPSULE TWICE A DAY AS NEEDED FOR PAIN 1/12/22   Stefano Barker MD   amLODIPine (NORVASC) 5 MG tablet TAKE 1 TABLET BY MOUTH DAILY 1/6/22   Josue Hernández MD   bisacodyl (DULCOLAX) 5 MG EC tablet Take 5 mg by mouth daily as needed for Constipation    Historical Provider, MD   simethicone (MYLICON) 80 MG chewable tablet Take 80 mg by mouth every 6 hours as needed prn    Historical Provider, MD   triamcinolone (NASACORT) 55 MCG/ACT nasal inhaler 2 sprays by Each Nostril route daily As needed    Historical Provider, MD   Polyethylene Glycol 3352 (MIRALAX PO) Take 17 g by mouth daily    Historical Provider, MD   Tetrahydrozoline-Zn Sulfate (EYE DROPS ALLERGY RELIEF OP) Apply 1 drop to eye daily as needed    Historical Provider, MD   Probiotic Product (PROBIOTIC-10) CHEW Take by mouth daily    Historical Provider, MD   linaclotide (LINZESS) 290 MCG CAPS capsule Take 290 mcg by mouth daily as needed    Historical Provider, MD   denosumab (PROLIA) 60 MG/ML SOLN SC injection Inject 60 mg into the skin once    Historical Provider, MD   folic acid (FOLVITE) 409 MCG tablet Take 400 mcg by mouth daily    Historical Provider, MD   b complex vitamins capsule Take 1 capsule by mouth daily 6/2/16   Stu Daly MD   FISH OIL Take 1,000 mg by mouth 2 times daily Indications: DECREASED ENERGY (INACTIVE)    Historical Provider, MD   aspirin 81 MG tablet Take 81 mg by mouth daily. Historical Provider, MD     Additional information:       VITAL SIGNS   Vitals:    12/28/22 0814   BP: (!) 153/63   Pulse: 90   Resp: 16   Temp:    SpO2: 99%       PHYSICAL:   Heart:  [x]Regular rate and rhythm  []Other:    Lungs:  [x]Clear    []Other:    Abdomen: [x]Soft    []Other:    Mental Status: [x]Alert & Oriented  []Other:      PLANNED PROCEDURE   []Biospy []Arteriogram              []Drainage   []Mediport Insertion  []Fistulogram []IV access       []Vertebroplasty / Augmentation  []IVC filter [x]Dialysis catheter []Biliary drainage  []Other: []CAPD Catheter []Nephrostomy Tube / Stent  SEDATION  Planned agent:[x]Midazolam []Meperidine []Sublimaze [x]Dilaudid []Morphine     []Diazepam  []Other:     ASA Classification:  []1 [x]2 []3 []4 []5  Class 1: A normal healthy patient  Class 2: Pt with mild to moderate systemic disease  Class 3: Severe systemic disease or disturbance  Class 4: Severe systemic disorders that are already life threatening. Class 5: Moribund pt with little chances of survival, for more than 24 hours.   Mallampati I Airway Classification:   []1 [x]2 []3 []4    [x]Pre-procedure diagnostic studies complete and results available. Comment:    [x]Previous sedation/anesthesia experiences assessed. Comment:    [x]The patient is an appropriate candidate to undergo the planned procedure sedation and anesthesia. (Refer to nursing sedation/analgesia documentation record)  [x]Formulation and discussion of sedation/procedure plan, risks, and expectations with patient and/or responsible adult completed. [x]Patient examined immediately prior to the procedure.  (Refer to nursing sedation/analgesia documentation record)    Jhony Leone MD, MD  Electronically signed 12/28/2022 at 8:19 AM

## 2022-12-28 NOTE — PROGRESS NOTES
0751 Pt arrived to special procedure room 2 for temp to tunnel dialysis catheter exchange, followed by CT guided random renal biopsy under conscious sedation. Procedure explained using teach back method. Pt states understanding.  shown to waiting area. 2298 Dr Genia Adams into assess patient and explain procedure. This procedure has been fully reviewed with the patient and written informed consent has been obtained. 6016 Patient transported to CT scan for CT guided random renal biopsy under conscious sedation. 5375 Patient assisted to CT table in prone position. Monitor attached to patient. Comfort ensured. 7747 Pre-procedure images obtained. 0081 Procedure begins. 0932 Samples obtained and sent to lab for analysis. 6495 Call placed to Histology Cindy Collado to inform of samples being sent and for Pathologist to call with verification of sample quality. 1018 Received call from Pathologist (Abel Duncan) that samples are good. 5030 Procedure complete. Post-procedure images obtained. 2690 Monitor removed. Patient assisted to bed in supine position. Comfort ensured. 1002 Patient transported to [facility] in stable condition.  at bedside.

## 2022-12-28 NOTE — PROGRESS NOTES
Hospitalist Progress Note      Patient: Law Bain      Unit/Bed:6K-06/006-A    YOB: 1955    MRN: [id number]       Acct: [de-identified]     PCP: Calista Barker MD    Date of Admission: 12/16/2022    Date/Time of Evaluation:  12/28/2022 at 9:15 AM    Assessment/Plan:    ERUM on CKD stage IV: apprec renal assist- ?etiology but suspect due to severe volume depletion per renal  Creat trending down to 6.3 on 12/20 but up again to 7.2 on 12/21 and 6.9 on 12/22 --> overall down from 8.2 on 12/16 --> Baseline creatinine range ~ 1.8-2.0 in past year   Since not improving per renal 12/22 temp HD catheter and started HD 12/22 and another run 12/23  -> creat down to 3.0 on12/24 but going back up to  5.7 on 12/26 and s/p HD 12/26 and creat down to 3.3 on 12/28/2022   Plan for tunneled HD catheter per d/w Dr Mclaughlin Ip   IVF held 12/23 per renal  ?? renal bx -->  cont hold ASA, plavix until next week per his rec for possible procedures (?tunneled tessio, ? renal bx)  IVF adjusted from bicarb gtt (12/16 - 12/19) and changed to NS 12/19 at 100 mL and creat cont worsen 12/21 --> HD started 12/22 per renal - IVF stopped 12/23  Renal u/s 12/20/22 with echogenic bilateral kidneys c/w chronic kidney disease  Serologic w/u 12/19 -> C3 little low, C4 normal , Antinuclear Ab Hep-2, IGG (-), myeloperoxidase (-), serine protease IgG (-), elevated kappa/lambda light chains, immunofixation normal, GBM Ab (-), ANCA (-), LUCINA (+)-> reflex (p)  Avoid nephrotoxic agents, renally dose medications - s/p bumex 2 mg po x 1 on 12/18  Strict I&O's  High AGMA, improving:   Likely due to ERUM/dehydration. On bicarb drip 12/16 - 12/19 as improved to WNL 12/19 and changed to NS 12/20 per renal and IVF held since 12/23  Improving since HD also started 12/22  No signs of infxn -- Lactic acid 0.8 12/16 and 1.1 on 12/20  Nephrology following  Hemoptysis, resolved  Patient noted to have 3 episodes of hemoptysis with coughing.    CT chest 12/17/22 with minimal atelectatic/fibrotic stranding left lung base and lingula. Sputum culture ordered but unable to obtain  Pulmonology consulted, appreciate recs: \"likely combination of recent URI dry humidity and use of plavix and ASA, Pulmonary service will sign off no follow-up needed \"   Hypokalemia   Replaced and improved - monitor with ERUM   Hypocalcemia  Daily calcium replacement prn  Persistently low - ?add tums- ?need PTH, vit D testing - will leave up to renal  Hypernatremia  Improved -- Secondary to #1, dehydration, nephrology following  Hypomagnesemia  Was on Po mag oxide starting 12/17 -> also given IV Mg 12/19 and then po Mg stopped 12/19 due to improved Mg  Cont monitor Mg prn  Hyperphosphatemia  Per renal no need for phosphorus binders at this time. D/t ERUM, should resolve as ERUM improves. Monitor prn as had improved to WNL on 12/22   Elevated troponin   0.033 on admission x 1 12/16 -> not repeated, EKG no acute ischemic changes. Patient denies chest pain - likely related to ERUM and electrolyte abn   Monitor for cardiac sx - no recent ischemic w/u and last echo 2/2022 with normal EF, no WMA  Abdominal pain, nausea, vomiting:   Patient reports N/V on admission - ?due to not being able to eat but more likely due to ERUM? ? Improving since 12/21  LFT 12/19/22 WNL  CT chest 12/17/22 with \"distended gallbladder likely related to not having eaten recently\" --> cont with nausea with eating   Check RUQ u/s 12/26 with numerous small non-vascular echogenic foci adjacent to GB wall thought to represent adherent sludge -> ??need HIDA scan  Stool softners, as needed miralax   As needed antiemetics zofran, phenergan IM prn   URI - sx improved  CXR on admission negative for pulmonary infiltrates or effusions. Patient has been on doxycycline started on 12/12. No evidence of bacterial infection, thus no further antibiotics. Supportive care.   Thrombocytopenia - resolved  Plts tended down from admission -- 131 on admission 12/17 -> down to 85 low on 12/21 and starting to trend up to normal since 12/25  ?etiology - ?plavix but Plts normal on admission and down - no signs of sepsis, ? DIC with renal failure but improving 12/23 - no other meds to contrib  Acute on chronic macrocytic anemia  Trending down during admission and Down to 6.7 on 12/21 am -> repeat 7.0  12/21 and down again 6.8 on 12/22 -> transfused 1 unit PRBC 12/22 as need for HD and hgb up to 8.4 on 12/25 and stable 8.8 on 12/28/2022    ? PPI/pepcid  down from 10.3 on 12/16 --> Baseline hemoglobin range ~10-11.    ?some slight loss with hemoptysis and per pulm felt due to epistaxis -> has resolved at least since 12/21  ASA, Plavix held starting 12/21 per renal for possible procedures needed   Suspect likely dilutional component from IVF compounded by poor kidney fxn. FOBT (-) 12/19  Iron studies notable for ferritin 402, iron 145, iron sat 90, TIBC less than 162, folate normal, vitamin B12 greater than 2000  Trend and monitor CBC. Retacrit started 12/21 per renal  hold heparin for DVT proph 12/22 and resume once hgb stable  Chronic HFpEF, compensated:   Echo (2/15/2022) notable for EF 55 to 60%, no WMA, G1 DD, mild tricuspid regurg. Continue ASA, statin, Plavix, amlodipine.     Daily weights, strict I&O's -- fluid mgmt per renal with ERUM and starting HD 12/22  Cardiac diet, 2G sodium  Essential hypertension, controlled:   Continue amlodipine 5 mg daily -> ?increase to 10 mg but monitor with HD started 12/22  Cont prn hydralazine  Monitor and adjust prn  (+) LUCINA   (+) on 12/19 -> reflex (p) - further rheum DS-DNA, SS, smooth AB labs sent  Hyperlipidemia:   Cont home Statin  Depression/anxiety:   Continue Wellbutrin, trazodone  ?on librax at home  History of CVA  No new sx  Continued on aspirin, Plavix, statin -> ASA, plavix held starting 12/21 for possible renal procedures and monitor neuro status  Mild MR, mild TR -- per echo 2/2022  Generalized weakness  Pt/OT following - monitor progress      . Chief Complaint: Dehydration     Hospital Course:   Per CNP Delmy Welsh note 12/20 \"Per HPI documented 12/16/2022: Trey Oleary is a 69 y.o. female with PMHx of CVA, depression, who presented to [facility] with chief complaint of dehydration, N/V. Patient states that she was seen by her PCP on Monday, 12/12 for congestion, sore throat, fatigue, weakness on going for >2 weeks. She was started on PO doxycycline. She continues to feel congested but has no sinus pain or pressure, no cough, afebrile. Approx 2 days after starting doxy, patient developed N/V. Reports very poor PO intake, states she has only been eating ice chips. Reports lower abdominal pain, last BM 3 days ago. Reports decreased urine output, states it is very dark. Denies dysuria, urgency, frequency. No chest pain, SOB, f/c. Pt follows with Dr. Matthew Tenorio for CKD. Admitted to med/tele for further management. \"     12/17/22: Resumed care of patient today. Patient afebrile, satting 94% on room air, with hemodynamically stable vital signs. Patient stating she feels weak and tired this morning. States that she still feels nauseous and when she ate some breakfast this morning she started dry heaving and almost vomited. States that she has very little appetite. Also reported some intermittent abdominal cramping. States she is passing gas. States her last bowel movement was Tuesday. Stool softeners added. Multiple electrolyte derangements as noted above. Creatinine 7.3 today, down from 8.2. Discussed case with Dr. Tamika Soares nephrology, who recommend decreasing bicarb drip to rate of 100 mL/hr. Patient making urine 900 mL OP overnight. 12/18/22: Afebrile, hemodynamically stable. No acute events overnight. Patient stating she feels a little better this morning but still feels weak. States that she has not had any nausea or vomiting since yesterday.   States she had 2 medium solid BMs and no longer has abdominal pain. States she ate all her breakfast.  Creatinine at 7.3 today, no improvement. Ionized calcium still remains low, will continue to replete. Potassium magnesium still low, will continue to replete. Continue IV bicarb drip.     12/19/22: Afebrile, satting 97% on room air, hemodynamically stable. Patient states she is feeling better. Reports no more hemoptysis. States that she still having some nausea and I eating much of her meals. Denies vomiting and abdominal pains. No other complaints. Creatinine 7.1 today. Slow improvement. Continue to correct electrolyte derangements as noted above. Continue IV bicarb drip     12/20/22: Hemodynamically stable. No acute events. Creatinine downtrending, 6.6 today. H&H fluctuating but stable. Phosphorus coming down. Ionized calcium still low, will replace again today. Potassium and magnesium okay will withhold replacement today. Patient stated that she did have acute episode of nausea followed by vomiting earlier this morning after eating her breakfast.  Currently states that she does not feel nauseous and has no complaints at this time. Continue IV fluids. \"    12/21: n/v improving, creat up to 7.2 again -> IVF adjusted  12/19 per renal as CO2 improving but renal fxn not improving - holding ASA, plavix in prep for possible bx, HD catheter. 12/22 -> creat still 6.9 -> per renal temp HD catheter placed and started on HD  12/23 -> HD ran again per renal - creat down to 3.9   12/24 -> creat down to 3.0  12/25 -> creat up to 4.5 with last HD 12/23 12/26 -> creat 5.7 and HD run 12/26; c/o nausea with eating although better    12.27.2022 pt states she is doing ok this am - cont with nausea with eating but no assoicated abd pain or nausea but overall improving. Denies any cp, sob. On RA. Afebrile. Mouth sores improving. No lightheaded/dizziness. No accurate I/O.   Last BM 12/23 x 2.  apprec renal assist -- creat improves with HD -- d/w  Mudadda and plan for tunneled HD catheter and likely renal bx  -> lytes, h/h stable. Await further renal plan. Cont with nausea with eating --> ? ?HIDA, ?need GI eval as ?if this lead to dehydration and ERUM. Cont monitor lytes, renal fxn, u/o, BP, h/h, plts, and cont to increase activity with PT/OT    12.28.2022 patient seen this am Cr 4.9, hgb stable, discussed during collaborative rounds getting renal biopsy today along with tunnel catheter HD, possibly discharge in a.m.      PMH, SURGICAL HX, FH, SOCIAL HX reviewed and updated as needed.     Medications:  Reviewed    Infusion Medications    sodium chloride 20 mL/hr at 12/28/22 0732    sodium chloride      [Held by provider] sodium chloride Stopped (12/23/22 1525)    sodium chloride       Scheduled Medications    epoetin mumtaz-epbx  2,000 Units SubCUTAneous Once per day on Mon Wed Fri    And    epoetin mumtaz-epbx  3,000 Units SubCUTAneous Once per day on Mon Wed Fri    [Held by provider] magnesium oxide  400 mg Oral BID    calcium replacement protocol   Other RX Placeholder    calcitRIOL  0.25 mcg Oral Once per day on Sun Tue Thu Sat    docusate sodium  100 mg Oral Daily    senna  1 tablet Oral Nightly    [Held by provider] potassium bicarb-citric acid  40 mEq Oral Daily    amLODIPine  5 mg Oral Daily    [Held by provider] aspirin  81 mg Oral Daily    buPROPion  200 mg Oral BID    [Held by provider] clopidogrel  75 mg Oral Daily    folic acid  916 mcg Oral Daily    montelukast  10 mg Oral Daily    atorvastatin  10 mg Oral Daily    traZODone  50 mg Oral Nightly    polyethylene glycol  17 g Oral Daily    sodium chloride flush  10 mL IntraVENous 2 times per day    [Held by provider] heparin (porcine)  5,000 Units SubCUTAneous 3 times per day     PRN Meds: sodium chloride, promethazine, chlordiazePOXIDE-clidinium, fluticasone, simethicone, sodium chloride flush, sodium chloride, ondansetron **OR** ondansetron, acetaminophen **OR** acetaminophen, dextromethorphan-guaiFENesin, cetirizine, hydrALAZINE      Intake/Output Summary (Last 24 hours) at 12/28/2022 0915  Last data filed at 12/27/2022 1617  Gross per 24 hour   Intake 640 ml   Output --   Net 640 ml       Diet:  Diet NPO Exceptions are: Sips of Water with Meds    Exam:  /69   Pulse 98   Temp 98.1 °F (36.7 °C) (Oral)   Resp 18   Ht 5' (1.524 m)   Wt 123 lb 7.3 oz (56 kg)   SpO2 97%   BMI 24.11 kg/m²     General appearance: No apparent distress, appears older than stated age and cooperative. Oriented x 3. HEENT: Pupils equal, round, and reactive to light. Conjunctivae/corneas clear. MM dry with some cracking on lips - no vesicles but some dried scabs on upper vermillion border and left lower lip  Neck: Supple, with full range of motion. Trachea midline. Respiratory:  Normal respiratory effort. Diminished, few rales in left bases, no wheezing, no rhonchi. No respiratory distress or accessory muscle use. Cardiovascular: Regular rate and rhythm with normal S1/S2, 2/6 DAVONTE LUSB,  No JVD. Abdomen: Soft, slight diffuse but alondra mid upper abd TTP,  non-distended with normal bowel sounds. No rebound or guarding  Musculoskeletal: No clubbing, cyanosis or edema bilaterally. Full range of motion without deformity. No calf tenderness palpation  Skin: Skin color, texture, turgor normal.  No rashes or lesions. Neurologic:  Neurovascularly intact without any focal sensory/motor deficits.  Cranial nerves: II-XII intact, grossly non-focal.  Psychiatric: Alert and oriented, flat affect, thought content appropriate, normal insight  Capillary Refill: Brisk,< 3 seconds   Peripheral Pulses: +2 palpable, equal bilaterally       All labs reviewed and interpreted by me:  Labs:   Recent Labs     12/26/22  0704 12/27/22  0438 12/28/22  0547   WBC 8.3 9.2 9.2   HGB 7.5* 8.8* 8.6*   HCT 23.8* 27.9* 27.2*    239 265     Recent Labs     12/26/22  0704 12/27/22  0438 12/28/22  0547    140 141   K 4.2 4.2 4.2    103 104   CO2 23 24 25   BUN 29* 15 26*   CREATININE 5.7* 3.3* 4.9*   CALCIUM 6.8* 8.5 8.2*     Recent Labs     12/27/22  0438   AST 20   ALT 20   BILITOT 0.3   ALKPHOS 153*       Recent Labs     12/27/22  0438 12/28/22  0547   INR 0.92 0.92     No results for input(s): CKTOTAL, TROPONINT in the last 72 hours. Urinalysis:      Lab Results   Component Value Date/Time    NITRU NEGATIVE 12/16/2022 05:20 PM    WBCUA 10-15 12/16/2022 05:20 PM    BACTERIA NONE SEEN 12/16/2022 05:20 PM    RBCUA 5-10 12/16/2022 05:20 PM    BLOODU TRACE 12/16/2022 05:20 PM    SPECGRAV 1.010 12/16/2022 05:20 PM    GLUCOSEU Negative 07/16/2021 04:09 PM    GLUCOSEU NEGATIVE 06/09/2020 03:03 PM       All radiology images and reports reviewed and interpreted by me:  Radiology:  IR FLUORO GUIDED NEEDLE PLACEMENT   Final Result   Status post removal of the indwelling non-tunneled dialysis catheter and replacement with a new tunneled dialysis catheter, as outlined above. **This report has been created using voice recognition software. It may contain minor errors which are inherent in voice recognition technology. **      Final report electronically signed by Dr. Kate Garcia on 12/28/2022 9:04 AM      [address]   Final Result   Numerous small nonvascular echogenic foci adherent to the gallbladder wall thought to represent adherent sludge. **This report has been created using voice recognition software. It may contain minor errors which are inherent in voice recognition technology. **      Final report electronically signed by Dr Arnold Taveras on 12/26/2022 3:27 PM      IR FLUORO GUIDED CVA DEVICE PLMT/REPLACE/REMOVAL   Final Result   Status post successful nontunneled dialysis catheter insertion. **This report has been created using voice recognition software. It may contain minor errors which are inherent in voice recognition technology. **      Final report electronically signed by Dr Dayami Avalos on 12/22/2022 1:41 PM      US RENAL COMPLETE   Final Result   Impression:   Echogenic bilateral kidneys, this represents chronic kidney disease. This document has been electronically signed by: Dimple Krishnan MD on    12/20/2022 08:27 PM      CT CHEST WO CONTRAST   Final Result   Minimal atelectatic/fibrotic stranding left lung base and lingula. Small hiatus hernia. Distended gallbladder, likely related to not having eaten recently. **This report has been created using voice recognition software. It may contain minor errors which are inherent in voice recognition technology. **      Final report electronically signed by Dr. Juan A Bello on 12/17/2022 5:56 PM      XR CHEST (2 VW)   Final Result   1. No interval change since previous study dated 9/18/2021. Edward Sotomayor **This report has been created using voice recognition software. It may contain minor errors which are inherent in voice recognition technology. **      Final report electronically signed by DR Bassam Saavedra on 12/16/2022 11:36 AM      CT BIOPSY RENAL    (Results Pending)       Diet: Diet NPO Exceptions are: Sips of Water with Meds    Smith: no    Microbiology:  throat cx 12/16 (-)    Covid 12/16 (-), influenza 12/16 (-)    Urine cx 12/17 = contaminants    Tele review last 24 hrs:  SR, no arrhythmias    DVT prophylaxis: [] Lovenox                                 [] SCDs                                 [x] SQ Heparin                                 [] Encourage ambulation           [] Already on Anticoagulation     Disposition:    [x[facility]       [] TCU       [] Rehab       [] Psych       [] SNF       [] [place]       [] Other-    Code Status: Full Code    PT/OT Eval Status: following      Electronically signed by Valentin Britt DO on 12/28/2022 at 9:15 AM

## 2022-12-28 NOTE — PROGRESS NOTES
[facility]   Nephrology progress note  12/28/2022, 1:06 PM      Pt Name:    Gaby Navarro  MRN:     [id number]     YOB: 1955  Admit Date:    12/16/2022 10:35 AM    Chief Complaint: Nephrology following for acute kidney injury/CKD    Subjective:  Patient seen and examined  No chest pain or shortness of breath  Some urine output noted    Objective:  24HR INTAKE/OUTPUT:    Intake/Output Summary (Last 24 hours) at 12/28/2022 1306  Last data filed at 12/27/2022 1617  Gross per 24 hour   Intake 320 ml   Output --   Net 320 ml        Admission weight: 114 lb (51.7 kg)  Wt Readings from Last 3 Encounters:   12/28/22 123 lb 7.3 oz (56 kg)   12/16/22 114 lb (51.7 kg)   12/12/22 116 lb (52.6 kg)        Vitals :   Vitals:    12/28/22 0937 12/28/22 0942 12/28/22 0947 12/28/22 1008   BP: (!) 115/57 (!) 111/59 135/67 (!) 147/72   Pulse: 91 94 94 88   Resp: 10 15 19 18   Temp:    97.7 °F (36.5 °C)   TempSrc:    Oral   SpO2: 93% 94% 94% 93%   Weight:       Height:           Physical examination  General Appearance:  Well developed.  No distress  Mouth/Throat:  Oral mucosa moist  Neck:  Supple, no JVD  Lungs:  Breath sounds: clear  Heart[de-identified]  S1,S2 heard  Abdomen:  Soft, non - tender  Musculoskeletal:  Edema -no edema noted in all 4 extremities well  No tremor noted  CNS grossly intact  Psych not agitated     Medications:  Infusion:    sodium chloride Stopped (12/28/22 1006)    sodium chloride      [Held by provider] sodium chloride Stopped (12/23/22 1525)    sodium chloride       Meds:    epoetin mumtaz-epbx  2,000 Units SubCUTAneous Once per day on Mon Wed Fri    And    epoetin mumtaz-epbx  3,000 Units SubCUTAneous Once per day on Mon Wed Fri    [Held by provider] magnesium oxide  400 mg Oral BID    calcium replacement protocol   Other RX Placeholder    calcitRIOL  0.25 mcg Oral Once per day on Sun Tue Thu Sat    docusate sodium  100 mg Oral Daily    senna  1 tablet Oral Nightly    [Held by provider] potassium bicarb-citric acid  40 mEq Oral Daily    amLODIPine  5 mg Oral Daily    [Held by provider] aspirin  81 mg Oral Daily    buPROPion  200 mg Oral BID    [Held by provider] clopidogrel  75 mg Oral Daily    folic acid  334 mcg Oral Daily    montelukast  10 mg Oral Daily    atorvastatin  10 mg Oral Daily    traZODone  50 mg Oral Nightly    polyethylene glycol  17 g Oral Daily    sodium chloride flush  10 mL IntraVENous 2 times per day    [Held by provider] heparin (porcine)  5,000 Units SubCUTAneous 3 times per day       Lab Data :  CBC:   Recent Labs     12/26/22  0704 12/27/22  0438 12/28/22  0547   WBC 8.3 9.2 9.2   HGB 7.5* 8.8* 8.6*   HCT 23.8* 27.9* 27.2*    239 265       CMP:  Recent Labs     12/26/22  0704 12/27/22  0438 12/28/22  0547    140 141   K 4.2 4.2 4.2    103 104   CO2 23 24 25   BUN 29* 15 26*   CREATININE 5.7* 3.3* 4.9*   GLUCOSE 70 81 76   CALCIUM 6.8* 8.5 8.2*       Hepatic:   Recent Labs     12/27/22 0438   LABALBU 3.4*   AST 20   ALT 20   BILITOT 0.3   ALKPHOS 153*         Assessment and Plan:  Renal -acute kidney injury most likely secondary to severe volume depletion,  She looked extremely dry, received IV fluids without much improvement so dialysis has been initiated  Serologic work-up negative, except LUCINA. Lupus work-up is pending at this time  We will get her dialyzed today she had a renal biopsy done this morning and a tunneled dialysis catheter placed as well  Awaiting outpatient dialysis unit set up  Electrolytes -within normal limits  Acid-base status-metabolic acidosis improved with dialysis  Hypocalcemia corrected calcium reasonable   CKD stage IV with baseline creatinine 1.8-2.0  Hx of fibromyalgia  Essential hypertension stable  Meds reviewed and discussed with patient,  and nursing staff    Catie Toledo MD  [facility]    This report has been created using voice recognition software.  It may contain minor errors which are inherent in voice recognition technology

## 2022-12-28 NOTE — PROGRESS NOTES
[facility]  SPEECH THERAPY MISSED TREATMENT NOTE  [facility] RENAL TELEMETRY 6K      Date: 2022  Patient Name: Storm Perdue        MRN: [id number]    : 1955  (69 y.o.)    REASON FOR MISSED TREATMENT:    ST in communication with RN Nat; RN reported patient out of room for procedure and would not return for extended time due to procedure and planned dialysis. ST to attempt at later date.     Sarath Mcdaniel M.A. CF-SLP

## 2022-12-28 NOTE — H&P
Formulation and discussion of sedation / procedure plans, risks, benefits, side effects and alternatives with patient and/or responsible adult completed.     Electronically signed by Francisco Curtis DO on 12/28/2022 at 8:42 AM

## 2022-12-28 NOTE — H&P
[facility]  Sedation/Analgesia History & Physical    Pt Name: Carmenza Peterson  MRN: [id number]  YOB: 1955  Provider Performing Procedure: Maria M Felix DO, MD  Primary Care Physician: Estella Marrero MD    PRE-PROCEDURE   DNR-CCA/DNR-CC []Yes [x]No  Brief History/Pre-Procedure Diagnosis: acute on chronic renal insufficiency          MEDICAL HISTORY  []CAD/Valve  []Liver Disease  []Lung Disease []Diabetes  [x]Hypertension [x]Renal Disease  [x]Additional information:       has a past medical history of Allergic rhinitis, Anxiety, CVA (cerebral infarction), Depression, Fibromyalgia, Headache(784.0), Hyperlipidemia, Hypertension, Irritable bowel syndrome, Kidney failure, Unspecified cerebral artery occlusion with cerebral infarction, and Unspecified sleep apnea. SURGICAL HISTORY   has a past surgical history that includes sinus surgery (10 years ago); Hemorrhoid surgery (unsure); Neck surgery (18  years ago); eye surgery; Endoscopy, colon, diagnostic (unsure); Colonoscopy (2012); Tubal ligation; Carpal tunnel release (Right); Hysterectomy; and Ovary removal (Bilateral).   Additional information:       ALLERGIES   Allergies as of 12/16/2022 - Fully Reviewed 12/16/2022   Allergen Reaction Noted    Pioglitazone Nausea And Vomiting 04/09/2018    Amoxicillin  02/18/2022    Amoxicillin-pot clavulanate Diarrhea 10/17/2014    Other Itching 09/18/2020    Ciprofloxacin  11/23/2012    Sulfa antibiotics Rash and Other (See Comments) 10/03/2014     Additional information:       MEDICATIONS   Coumadin Use Last 5 Days [x]No []Yes  Antiplatelet drug therapy use last 5 days  [x]No []Yes  Other anticoagulant use last 5 days  [x]No []Yes    Current Facility-Administered Medications:     0.45 % sodium chloride infusion, , IntraVENous, Continuous, Aleena Connell MD, Last Rate: 20 mL/hr at 12/28/22 0732, New Bag at 12/28/22 0732    clindamycin (CLEOCIN) 600 mg in dextrose 5 % 50 mL IVPB, 600 mg, IntraVENous, Once, Leighann Vidales MD, Last Rate: 100 mL/hr at 12/28/22 0827, 600 mg at 12/28/22 0827    0.9 % sodium chloride infusion, , IntraVENous, PRN, Robyn Vance MD    epoetin mumtaz-epbx (RETACRIT) injection 2,000 Units, 2,000 Units, SubCUTAneous, Once per day on Mon Wed Fri, 2,000 Units at 12/26/22 0754 **AND** epoetin mumtaz-epbx (RETACRIT) injection 3,000 Units, 3,000 Units, SubCUTAneous, Once per day on Mon Wed Fri, Selina Mueller MD, 3,000 Units at 12/26/22 0755    promethazine (PHENERGAN) injection 6.25 mg, 6.25 mg, IntraMUSCular, Q6H PRN, Tuan Mcmullen APRN - CNP, 6.25 mg at 12/22/22 2107    [Held by provider] magnesium oxide (MAG-OX) tablet 400 mg, 400 mg, Oral, BID, Tuan Mcmullen APRN - CNP, 400 mg at 12/19/22 1945    [Held by provider] 0.45 % sodium chloride infusion, , IntraVENous, Continuous, Selina Mueller MD, Stopped at 12/23/22 1525    calcium replacement protocol, , Other, RX Placeholder, Tuan Mcmullen APRN - CNP    calcitRIOL (ROCALTROL) capsule 0.25 mcg, 0.25 mcg, Oral, Once per day on Sun Tue Thu Sat, Halie Alcaraz, DO, 0.25 mcg at 12/27/22 9040    docusate sodium (COLACE) capsule 100 mg, 100 mg, Oral, Daily, Tuan Mcmullen APRN - CNP, 100 mg at 12/27/22 0180    senna (SENOKOT) tablet 8.6 mg, 1 tablet, Oral, Nightly, Tuan Mcmullen, APRN - CNP, 8.6 mg at 12/27/22 2050    [Held by provider] potassium bicarb-citric acid (EFFER-K) effervescent tablet 40 mEq, 40 mEq, Oral, Daily, Tuan Mcmullen APRN - CNP, 40 mEq at 12/19/22 0820    amLODIPine (NORVASC) tablet 5 mg, 5 mg, Oral, Daily, Noy Riley PA-C, 5 mg at 12/27/22 3262    [Held by provider] aspirin EC tablet 81 mg, 81 mg, Oral, Daily, Noy Riley PA-C, 81 mg at 12/20/22 2102    buPROPion [facility] SR) extended release tablet 200 mg, 200 mg, Oral, BID, Elen Shipman PA-C, 200 mg at 12/27/22 2050    chlordiazePOXIDE-clidinium (LIBRAX) 5-2.5 MG per capsule 1 capsule, 1 capsule, Oral, BID PRN, Samuel Fernández HUI Shipman    [Held by provider] clopidogrel (PLAVIX) tablet 75 mg, 75 mg, Oral, Daily, DENISSE Barrett-C, 75 mg at 64/67/12 2543    folic acid (FOLVITE) tablet 500 mcg, 500 mcg, Oral, Daily, DENISSE Barrett-C, 500 mcg at 12/27/22 0814    montelukast (SINGULAIR) tablet 10 mg, 10 mg, Oral, Daily, DENISSE Barrett-C, 10 mg at 12/27/22 2050    atorvastatin (LIPITOR) tablet 10 mg, 10 mg, Oral, Daily, Elen Shipman PA-C, 10 mg at 12/27/22 0814    traZODone (DESYREL) tablet 50 mg, 50 mg, Oral, Nightly, DENISSE Barrett-C, 50 mg at 12/27/22 2050    fluticasone (FLONASE) 50 MCG/ACT nasal spray 1 spray, 1 spray, Each Nostril, Daily PRN, America Garcia PA-C, 1 spray at 12/19/22 1635    simethicone (MYLICON) chewable tablet 80 mg, 80 mg, Oral, Q6H PRN, Elen Shipman PA-C    polyethylene glycol (GLYCOLAX) packet 17 g, 17 g, Oral, Daily, DENISSE Barrett-C, 17 g at 12/27/22 0814    sodium chloride flush 0.9 % injection 10 mL, 10 mL, IntraVENous, 2 times per day, DENISSE Barrett-C, 10 mL at 12/27/22 2050    sodium chloride flush 0.9 % injection 10 mL, 10 mL, IntraVENous, PRN, DENISSE Barrett-C    0.9 % sodium chloride infusion, , IntraVENous, PRN, Elen Shipman PA-C    [Held by provider] heparin (porcine) injection 5,000 Units, 5,000 Units, SubCUTAneous, 3 times per day, America Garcia PA-C, 5,000 Units at 12/21/22 0606    ondansetron (ZOFRAN-ODT) disintegrating tablet 4 mg, 4 mg, Oral, Q8H PRN, 4 mg at 12/27/22 1157 **OR** ondansetron (ZOFRAN) injection 4 mg, 4 mg, IntraVENous, Q6H PRN, Elen Shipman, PA-C, 4 mg at 12/27/22 1523    acetaminophen (TYLENOL) tablet 650 mg, 650 mg, Oral, Q6H PRN, 650 mg at 12/26/22 0253 **OR** acetaminophen (TYLENOL) suppository 650 mg, 650 mg, Rectal, Q6H PRN, Elen Shipman PA-C    dextromethorphan-guaiFENesin (MUCINEX DM)  MG per extended release tablet 1 tablet, 1 tablet, Oral, BID PRN, Elen Shipman PA-C, 1 tablet at 12/26/22 0752    cetirizine (ZYRTEC) tablet 5 mg, 5 mg, Oral, Nightly PRN, Elen Shipman PA-C, 5 mg at 12/21/22 2014    hydrALAZINE (APRESOLINE) injection 5 mg, 5 mg, IntraVENous, Q6H PRN, Elen Shipman PA-C, 5 mg at 12/22/22 2107  Prior to Admission medications    Medication Sig Start Date End Date Taking?  Authorizing Provider   cetirizine (ZYRTEC) 10 MG tablet Take 10 mg by mouth daily as needed for Allergies   Yes Historical Provider, MD   docusate sodium (COLACE) 100 MG capsule Take 100 mg by mouth daily as needed for Constipation   Yes Historical Provider, MD   traZODone (DESYREL) 50 MG tablet TAKE 1 TABLET NIGHTLY 12/16/22   CARLOS MANUEL Rice - CNP   montelukast (SINGULAIR) 10 MG tablet Take 1 tablet by mouth daily 12/15/22   Sonia Castro MD   buPROPion [facility] SR) 200 MG extended release tablet Take 1 tablet by mouth 2 times daily 10/12/22   Sonia Castro MD   simvastatin (ZOCOR) 20 MG tablet TAKE 1 TABLET BY MOUTH DAILY 4/12/22   Sonia Castro MD   clopidogrel (PLAVIX) 75 MG tablet TAKE 1 TABLET BY MOUTH DAILY 3/14/22   Sonia Castro MD   chlordiazePOXIDE-clidinium (LIBRAX) 5-2.5 MG per capsule TAKE 1 CAPSULE TWICE A DAY AS NEEDED FOR PAIN 1/12/22   Sonia Castro MD   amLODIPine (NORVASC) 5 MG tablet TAKE 1 TABLET BY MOUTH DAILY 1/6/22   Letitia Montero MD   bisacodyl (DULCOLAX) 5 MG EC tablet Take 5 mg by mouth daily as needed for Constipation    Historical Provider, MD   simethicone (MYLICON) 80 MG chewable tablet Take 80 mg by mouth every 6 hours as needed prn    Historical Provider, MD   triamcinolone (NASACORT) 55 MCG/ACT nasal inhaler 2 sprays by Each Nostril route daily As needed    Historical Provider, MD   Polyethylene Glycol 3350 (MIRALAX PO) Take 17 g by mouth daily    Historical Provider, MD   Tetrahydrozoline-Zn Sulfate (EYE DROPS ALLERGY RELIEF OP) Apply 1 drop to eye daily as needed    Historical Provider, MD   Probiotic Product (PROBIOTIC-10) CHEW Take by mouth daily    Historical Provider, MD   linaclotide (LINZESS) 290 MCG CAPS capsule Take 290 mcg by mouth daily as needed    Historical Provider, MD   denosumab (PROLIA) 60 MG/ML SOLN SC injection Inject 60 mg into the skin once    Historical Provider, MD   folic acid (FOLVITE) 995 MCG tablet Take 400 mcg by mouth daily    Historical Provider, MD   b complex vitamins capsule Take 1 capsule by mouth daily 6/2/16   Shabana Guzman MD   FISH OIL Take 1,000 mg by mouth 2 times daily Indications: DECREASED ENERGY (INACTIVE)    Historical Provider, MD   aspirin 81 MG tablet Take 81 mg by mouth daily. Historical Provider, MD     Additional information:       VITAL SIGNS   Vitals:    12/28/22 0835   BP: (!) 151/82   Pulse: 94   Resp: 16   Temp:    SpO2: 90%       PHYSICAL:   Heart:  [x]Regular rate and rhythm  []Other:    Lungs:  [x]Clear    []Other:    Abdomen: [x]Soft    []Other:    Mental Status: [x]Alert & Oriented  []Other:      PLANNED PROCEDURE   [x]Biospy []Arteriogram              []Drainage   []Mediport Insertion  []Fistulogram []IV access       []Vertebroplasty / Augmentation  []IVC filter []Dialysis catheter []Biliary drainage  []Other: []CAPD Catheter []Nephrostomy Tube / Stent  SEDATION  Planned agent:[x]Midazolam []Meperidine [x]Sublimaze []Dilaudid []Morphine     []Diazepam  []Other:     ASA Classification:  []1 [x]2 []3 []4 []5  Class 1: A normal healthy patient  Class 2: Pt with mild to moderate systemic disease  Class 3: Severe systemic disease or disturbance  Class 4: Severe systemic disorders that are already life threatening. Class 5: Moribund pt with little chances of survival, for more than 24 hours. Mallampati I Airway Classification:   []1 [x]2 []3 []4    [x]Pre-procedure diagnostic studies complete and results available. Comment:    [x]Previous sedation/anesthesia experiences assessed.    Comment:    [x]The patient is an appropriate candidate to undergo the planned procedure sedation and anesthesia. (Refer to nursing sedation/analgesia documentation record)  [x]Formulation and discussion of sedation/procedure plan, risks, and expectations with patient and/or responsible adult completed. [x]Patient examined immediately prior to the procedure.  (Refer to nursing sedation/analgesia documentation record)    Deanna Schofield DO, DO, MD  Electronically signed 12/28/2022 at 8:40 AM

## 2022-12-28 NOTE — PROGRESS NOTES
[facility]  OCCUPATIONAL THERAPY MISSED TREATMENT NOTE  [facility] RENAL TELEMETRY 6K  6K-06/006-A      Date: 2022  Patient Name: Lori Cohen        CSN: [id number]   : 1955  (69 y.o.)  Gender: female   Referring Practitioner: CARLOS MANUEL Guerrero CNP  Diagnosis: Acute kidney injury superimposed on chronic kidney disease         REASON FOR MISSED TREATMENT: Patient Unavailable Attempt 1: patient off floor for testing, Attempt 2: patient off floor for dialysis. Will attempt next available time.

## 2022-12-28 NOTE — PROGRESS NOTES
Physician Progress Note      PATIENT:               Melissa Burrell  CSN #:                  [id number]  :                       1955  ADMIT DATE:       2022 10:35 AM  DISCH DATE:  Anahi Irwin  PROVIDER #:        Eda Canas DO          QUERY TEXT:    Patient admitted with ERUM. Documentation reflects possible ATN in note(s)   dated -. If possible, please document in the progress notes and   discharge summary if ATN was: The medical record reflects the following:  Risk Factors: ERUM  Clinical Indicators: Cr trend since arrival 7.3, 7.1, 6.6, 6.3, 7.2, 6.9, 3.9,   3.0, 4.5, 5.7, 3.3, 4.9  Treatment: labs, imaging, IVF, monitoring, dialysis  Options provided:  -- ERUM with ATN confirmed after study  -- ERUM with ATN treated and resolved  -- ATN ruled out after study, ERUM only  -- Other - I will add my own diagnosis  -- Disagree - Not applicable / Not valid  -- Disagree - Clinically unable to determine / Unknown  -- Refer to Clinical Documentation Reviewer    PROVIDER RESPONSE TEXT:    Provider is clinically unable to determine a response to this query.     Query created by: Drew Teresa on 2022 1:29 PM      Electronically signed by:  Eda Canas DO 2022 1:36 PM

## 2022-12-28 NOTE — PLAN OF CARE
Problem: Discharge Planning  Goal: Discharge to home or other facility with appropriate resources  Outcome: Progressing  Flowsheets  Taken 12/28/2022 0024  Discharge to home or other facility with appropriate resources: Identify barriers to discharge with patient and caregiver  Taken 12/27/2022 2039  Discharge to home or other facility with appropriate resources: Identify barriers to discharge with patient and caregiver     Problem: Pain  Goal: Verbalizes/displays adequate comfort level or baseline comfort level  Recent Flowsheet Documentation  Taken 12/27/2022 2319 by Jj Faria RN  Verbalizes/displays adequate comfort level or baseline comfort level: Encourage patient to monitor pain and request assistance  Taken 12/27/2022 2039 by Jj Faria RN  Verbalizes/displays adequate comfort level or baseline comfort level: Encourage patient to monitor pain and request assistance     Problem: Skin/Tissue Integrity  Goal: Absence of new skin breakdown  Description: 1. Monitor for areas of redness and/or skin breakdown  2. Assess vascular access sites hourly  3. Every 4-6 hours minimum:  Change oxygen saturation probe site  4. Every 4-6 hours:  If on nasal continuous positive airway pressure, respiratory therapy assess nares and determine need for appliance change or resting period.   Outcome: Progressing     Problem: Safety - Adult  Goal: Free from fall injury  Outcome: Progressing  Flowsheets (Taken 12/27/2022 2039)  Free From Fall Injury: Instruct family/caregiver on patient safety     Problem: ABCDS Injury Assessment  Goal: Absence of physical injury  Outcome: Progressing  Flowsheets (Taken 12/27/2022 2039)  Absence of Physical Injury: Implement safety measures based on patient assessment     Problem: Genitourinary - Adult  Goal: Absence of urinary retention  Recent Flowsheet Documentation  Taken 12/28/2022 0024 by Jj Faria RN  Absence of urinary retention: Assess patients ability to void and empty bladder  Taken 12/27/2022 2039 by Frank Rodriguez RN  Absence of urinary retention:   Assess patients ability to void and empty bladder   Monitor intake/output and perform bladder scan as needed     Problem: Metabolic/Fluid and Electrolytes - Adult  Goal: Electrolytes maintained within normal limits  Outcome: Progressing  Flowsheets  Taken 12/28/2022 0024  Electrolytes maintained within normal limits: Monitor labs and assess patient for signs and symptoms of electrolyte imbalances  Taken 12/27/2022 2039  Electrolytes maintained within normal limits: Monitor labs and assess patient for signs and symptoms of electrolyte imbalances     Problem: Metabolic/Fluid and Electrolytes - Adult  Goal: Hemodynamic stability and optimal renal function maintained  Outcome: Progressing  Flowsheets  Taken 12/28/2022 0024  Hemodynamic stability and optimal renal function maintained: Monitor labs and assess for signs and symptoms of volume excess or deficit  Taken 12/27/2022 2039  Hemodynamic stability and optimal renal function maintained: Monitor labs and assess for signs and symptoms of volume excess or deficit     Problem: Skin/Tissue Integrity - Adult  Goal: Skin integrity remains intact  Outcome: Progressing  Flowsheets  Taken 12/28/2022 0024  Skin Integrity Remains Intact: Monitor for areas of redness and/or skin breakdown  Taken 12/27/2022 2039  Skin Integrity Remains Intact: Monitor for areas of redness and/or skin breakdown     Problem: Musculoskeletal - Adult  Goal: Return mobility to safest level of function  Outcome: Progressing  Flowsheets  Taken 12/28/2022 0024  Return Mobility to Safest Level of Function:   Assess patient stability and activity tolerance for standing, transferring and ambulating with or without assistive devices   Assist with transfers and ambulation using safe patient handling equipment as needed  Taken 12/27/2022 2039  Return Mobility to Safest Level of Function:   Assess patient stability and activity tolerance for standing, transferring and ambulating with or without assistive devices   Assist with transfers and ambulation using safe patient handling equipment as needed     Problem: Chronic Conditions and Co-morbidities  Goal: Patient's chronic conditions and co-morbidity symptoms are monitored and maintained or improved  Outcome: Progressing  Flowsheets  Taken 12/28/2022 0024  Care Plan - Patient's Chronic Conditions and Co-Morbidity Symptoms are Monitored and Maintained or Improved: Monitor and assess patient's chronic conditions and comorbid symptoms for stability, deterioration, or improvement  Taken 12/27/2022 2039  Care Plan - Patient's Chronic Conditions and Co-Morbidity Symptoms are Monitored and Maintained or Improved: Monitor and assess patient's chronic conditions and comorbid symptoms for stability, deterioration, or improvement     Problem: Nutrition Deficit:  Goal: Optimize nutritional status  Outcome: Progressing

## 2022-12-28 NOTE — PROGRESS NOTES
[facility]  PHYSICAL THERAPY MISSED TREATMENT NOTE  [facility] RENAL TELEMETRY 6K    Date: 2022  Patient Name: Abel Cooley        MRN: [id number]   : 1955  (69 y.o.)  Gender: female   Referring Practitioner: CARLOS MANUEL Washburn CNP  Diagnosis: dehydration         REASON FOR MISSED TREATMENT:  Patient at testing and/or off unit. Pt off floor to IR getting dialysis catheter placed and also to CT for renal biopsy. Will try back later if able.     2nd attempt pt off floor for dialysis (2232)

## 2022-12-28 NOTE — FLOWSHEET NOTE
Stable 3 hour TX completed. Removed 1.5 L of fluid. pt tolerated fluid removal well. Bilateral cath ports flushed with normal saline, clamped, and secured with tegos. TX record printed for scanning into EMR. Report called to primary RN.   12/28/22 1400 12/28/22 5174   Vital Signs   BP (!) 139/105 (!) 142/66   Temp 97.7 °F (36.5 °C) 97.8 °F (36.6 °C)   Heart Rate 64 90   Resp 18 18   Weight 119 lb 0.8 oz (54 kg) 115 lb 11.9 oz (52.5 kg)   Weight Method Bed scale Bed scale   Post-Hemodialysis Assessment   Post-Treatment Procedures  --  Blood returned;Catheter Capped, clamped with Saline x2 ports   Machine Disinfection Process  --  Acid/Vinegar Clean;Heat Disinfect; Exterior Machine Disinfection   Blood Volume Processed (Liters)  --  52.2 l/min   Dialyzer Clearance  --  Lightly streaked   Duration of Treatment (minutes)  --  180 minutes   Heparin Amount Administered During Treatment (mL)  --  0 mL   Hemodialysis Intake (ml)  --  400 ml   Hemodialysis Output (ml)  --  1900 ml   NET Removed (ml)  --  1500   Tolerated Treatment  --  Good

## 2022-12-28 NOTE — CARE COORDINATION
12/28/22, 8:36 AM EST    DISCHARGE ON GOING EVALUATION    [place] day: 12  Location: [facility]06/006-A Reason for admit: Dehydration [E86.0]  [facility] [N17.9]  Acute renal failure superimposed on chronic kidney disease, unspecified CKD stage, unspecified acute renal failure type ([facility] [N17.9, N18.9]  Nausea and vomiting, unspecified vomiting type [R11.2]   Barriers to Discharge: Renal bx today. Tunneled cath needs placed, and OP dialysis approval is pending. PCP: Sonia Castro MD  Readmission Risk Score: 18%  Patient Goals/Plan/Treatment Preferences: Home with . New OP HD.

## 2022-12-28 NOTE — OP NOTE
Department of Radiology  Post Procedure Progress Note      Pre-Procedure Diagnosis:  Renal Failure    Procedure Performed: Dialysis Catheter insertion     Anesthesia: local , versed and dilaudid    Findings: successful    Immediate Complications:  None    Estimated Blood Loss: minimal    SEE DICTATED PROCEDURE NOTE FOR COMPLETE DETAILS.       Emilee Bloom MD   12/28/2022 8:38 AM

## 2022-12-28 NOTE — PROGRESS NOTES
[facility]  OCCUPATIONAL THERAPY MISSED TREATMENT NOTE  [facility] RENAL TELEMETRY 6K  6K-06/006-A      Date: 2022  Patient Name: Gloria Howe        CSN: [id number]   : 1955  (69 y.o.)  Gender: female   Referring Practitioner: CARLOS MANUEL Lee CNP  Diagnosis: Acute kidney injury superimposed on chronic kidney disease         REASON FOR MISSED TREATMENT: Patient Off Floor for Testing Will attempt later today as time allows.

## 2022-12-28 NOTE — H&P
Formulation and discussion of sedation / procedure plans, risks, benefits, side effects and alternatives with patient and/or responsible adult completed. History and Physical reviewed and unchanged.     Electronically signed by Bhavna Chu MD on 12/28/22 at 8:19 AM EST

## 2022-12-28 NOTE — POST SEDATION
Sedation Post Procedure Note    Patient Name: Jarod Moore   YOB: 1955  Room/Bed: [facility]/006-A  Medical Record Number: [id number]  Date: 12/28/2022   Time: 9:50 AM         Physicians/Assistants: Amira Thakkar DO, MD    Procedure Performed:  CT guided random renal biopsy, right kidney    Post-Sedation Vital Signs:  Vitals:    12/28/22 0947   BP: 135/67   Pulse: 94   Resp: 19   Temp:    SpO2: 94%      Vital signs were reviewed and were stable after the procedure (see flow sheet for vitals)            Post-Sedation Exam: stable           Complications: none    Electronically signed by Amira Thakkar DO on 12/28/2022 at 9:50 AM

## 2022-12-28 NOTE — PLAN OF CARE
Problem: Discharge Planning  Goal: Discharge to home or other facility with appropriate resources  12/28/2022 1037 by Magalie Fraire RN  Outcome: Progressing  Flowsheets (Taken 12/28/2022 1037)  Discharge to home or other facility with appropriate resources: Identify barriers to discharge with patient and caregiver     Problem: Skin/Tissue Integrity  Goal: Absence of new skin breakdown  Description: 1. Monitor for areas of redness and/or skin breakdown  2. Assess vascular access sites hourly  3. Every 4-6 hours minimum:  Change oxygen saturation probe site  4. Every 4-6 hours:  If on nasal continuous positive airway pressure, respiratory therapy assess nares and determine need for appliance change or resting period. 12/28/2022 1037 by Magalie Fraire RN  Outcome: Progressing  Note: No new skin breakdown. Will continue to monitor.      Problem: Safety - Adult  Goal: Free from fall injury  12/28/2022 1037 by Magalie Fraire RN  Outcome: Progressing  Flowsheets (Taken 12/28/2022 1037)  Free From Fall Injury: Instruct family/caregiver on patient safety     Problem: ABCDS Injury Assessment  Goal: Absence of physical injury  12/28/2022 1037 by Magalie Fraire RN  Outcome: Progressing  Flowsheets (Taken 12/28/2022 1037)  Absence of Physical Injury: Implement safety measures based on patient assessment     Problem: Metabolic/Fluid and Electrolytes - Adult  Goal: Hemodynamic stability and optimal renal function maintained  12/28/2022 1037 by Magalie Fraire RN  Outcome: Progressing  Flowsheets (Taken 12/28/2022 1037)  Hemodynamic stability and optimal renal function maintained: Monitor labs and assess for signs and symptoms of volume excess or deficit     Problem: Chronic Conditions and Co-morbidities  Goal: Patient's chronic conditions and co-morbidity symptoms are monitored and maintained or improved  12/28/2022 1037 by Magalie Fraire RN  Outcome: Progressing  Flowsheets (Taken 12/28/2022 1037)  Care Plan - Patient's Chronic Conditions and Co-Morbidity Symptoms are Monitored and Maintained or Improved: Monitor and assess patient's chronic conditions and comorbid symptoms for stability, deterioration, or improvement     Problem: Nutrition Deficit:  Goal: Optimize nutritional status  12/28/2022 1037 by Georgina Vaughn RN  Outcome: Progressing  Flowsheets (Taken 12/28/2022 1037)  Nutrient intake appropriate for improving, restoring, or maintaining nutritional needs: Assess nutritional status and recommend course of action     Problem: Musculoskeletal - Adult  Goal: Return mobility to safest level of function  12/28/2022 1037 by Georgina Vaughn RN  Outcome: Progressing  Flowsheets (Taken 12/28/2022 1037)  Return Mobility to Safest Level of Function:   Assess patient stability and activity tolerance for standing, transferring and ambulating with or without assistive devices   Assist with transfers and ambulation using safe patient handling equipment as needed    Care plan reviewed with patient. Patient verbalize understanding of the plan of care and contribute to goal setting.

## 2022-12-28 NOTE — PROGRESS NOTES
0751 Pt in specials radiology for temp to tunneled dialysis catheter insertion then CT guided random renal biopsy. Discussed procedures with pt and pt verbalizes understanding. 2088 Dr Radha Mckinley to speak to pt regarding biopsy. Consent signed. 5014 Dr Emily Allen to speak to pt regarding temp to tunneled. Consent signed. 9296 Right chest prepped and draped. 0832 28 cm Duraflow catheter inserted in right IJ over wire. Pt tolerated well. 1291 Bio-patch placed at catheter site with op-site dressing. Triple antibiotic ointment applied to neck site with 2 x 2 and op-site dressing. Sites without redness, swelling or hematoma. 0311 Report to April RN. Dr Emily Allen to speak to . 3548 Pt positioned on bed for comfort and transferred to CT for biopsy. Phone and glasses given to .

## 2022-12-29 LAB
ANION GAP SERPL CALCULATED.3IONS-SCNC: 10 MEQ/L (ref 8–16)
ANTI SSA: NORMAL
ANTI SSB: 0 AU/ML (ref 0–40)
BACTERIA: ABNORMAL
BASOPHILS # BLD: 0.8 %
BASOPHILS ABSOLUTE: 0.1 THOU/MM3 (ref 0–0.1)
BILIRUBIN URINE: NEGATIVE
BLOOD, URINE: ABNORMAL
BUN BLDV-MCNC: 14 MG/DL (ref 7–22)
CALCIUM SERPL-MCNC: 8 MG/DL (ref 8.5–10.5)
CASTS: ABNORMAL /LPF
CASTS: ABNORMAL /LPF
CHARACTER, URINE: CLEAR
CHLORIDE BLD-SCNC: 105 MEQ/L (ref 98–111)
CO2: 26 MEQ/L (ref 23–33)
COLOR: YELLOW
CREAT SERPL-MCNC: 3.3 MG/DL (ref 0.4–1.2)
CRYSTALS: ABNORMAL
DSDNA ANTIBODY: NORMAL
EOSINOPHIL # BLD: 0.7 %
EOSINOPHILS ABSOLUTE: 0.1 THOU/MM3 (ref 0–0.4)
EPITHELIAL CELLS, UA: ABNORMAL /HPF
ERYTHROCYTE [DISTWIDTH] IN BLOOD BY AUTOMATED COUNT: 17.1 % (ref 11.5–14.5)
ERYTHROCYTE [DISTWIDTH] IN BLOOD BY AUTOMATED COUNT: 56.6 FL (ref 35–45)
GFR SERPL CREATININE-BSD FRML MDRD: 15 ML/MIN/1.73M2
GLUCOSE BLD-MCNC: 79 MG/DL (ref 70–108)
GLUCOSE, URINE: NEGATIVE MG/DL
HCT VFR BLD CALC: 26.8 % (ref 37–47)
HEMOGLOBIN: 8.5 GM/DL (ref 12–16)
IMMATURE GRANS (ABS): 0.38 THOU/MM3 (ref 0–0.07)
IMMATURE GRANULOCYTES: 3.7 %
KETONES, URINE: NEGATIVE
LEUKOCYTE ESTERASE, URINE: ABNORMAL
LYMPHOCYTES # BLD: 19.4 %
LYMPHOCYTES ABSOLUTE: 2 THOU/MM3 (ref 1–4.8)
MCH RBC QN AUTO: 29.3 PG (ref 26–33)
MCHC RBC AUTO-ENTMCNC: 31.7 GM/DL (ref 32.2–35.5)
MCV RBC AUTO: 92.4 FL (ref 81–99)
MISCELLANEOUS LAB TEST RESULT: ABNORMAL
MONOCYTES # BLD: 11.8 %
MONOCYTES ABSOLUTE: 1.2 THOU/MM3 (ref 0.4–1.3)
NITRITE, URINE: NEGATIVE
NUCLEATED RED BLOOD CELLS: 0 /100 WBC
PH UA: 7 (ref 5–9)
PLATELET # BLD: 242 THOU/MM3 (ref 130–400)
PMV BLD AUTO: 10 FL (ref 9.4–12.4)
POTASSIUM REFLEX MAGNESIUM: 4.8 MEQ/L (ref 3.5–5.2)
PROTEIN UA: 100 MG/DL
RBC # BLD: 2.9 MILL/MM3 (ref 4.2–5.4)
RBC URINE: ABNORMAL /HPF
RENAL EPITHELIAL, UA: ABNORMAL
SEG NEUTROPHILS: 63.6 %
SEGMENTED NEUTROPHILS ABSOLUTE COUNT: 6.6 THOU/MM3 (ref 1.8–7.7)
SODIUM BLD-SCNC: 141 MEQ/L (ref 135–145)
SPECIFIC GRAVITY UA: 1.01 (ref 1–1.03)
UROBILINOGEN, URINE: 0.2 EU/DL (ref 0–1)
WBC # BLD: 10.3 THOU/MM3 (ref 4.8–10.8)
WBC UA: ABNORMAL /HPF
YEAST: ABNORMAL

## 2022-12-29 PROCEDURE — 85025 COMPLETE CBC W/AUTO DIFF WBC: CPT

## 2022-12-29 PROCEDURE — 36415 COLL VENOUS BLD VENIPUNCTURE: CPT

## 2022-12-29 PROCEDURE — 6370000000 HC RX 637 (ALT 250 FOR IP): Performed by: PHYSICIAN ASSISTANT

## 2022-12-29 PROCEDURE — 6370000000 HC RX 637 (ALT 250 FOR IP): Performed by: INTERNAL MEDICINE

## 2022-12-29 PROCEDURE — 81001 URINALYSIS AUTO W/SCOPE: CPT

## 2022-12-29 PROCEDURE — 80048 BASIC METABOLIC PNL TOTAL CA: CPT

## 2022-12-29 PROCEDURE — 6370000000 HC RX 637 (ALT 250 FOR IP)

## 2022-12-29 PROCEDURE — 1200000003 HC TELEMETRY R&B

## 2022-12-29 PROCEDURE — 6360000002 HC RX W HCPCS

## 2022-12-29 PROCEDURE — 2580000003 HC RX 258: Performed by: PHYSICIAN ASSISTANT

## 2022-12-29 RX ADMIN — BUPROPION HYDROCHLORIDE 200 MG: 100 TABLET, FILM COATED, EXTENDED RELEASE ORAL at 21:43

## 2022-12-29 RX ADMIN — CETIRIZINE HYDROCHLORIDE 5 MG: 5 TABLET ORAL at 10:51

## 2022-12-29 RX ADMIN — PROMETHAZINE HYDROCHLORIDE 6.25 MG: 25 INJECTION INTRAMUSCULAR; INTRAVENOUS at 16:21

## 2022-12-29 RX ADMIN — TRAZODONE HYDROCHLORIDE 50 MG: 50 TABLET ORAL at 21:43

## 2022-12-29 RX ADMIN — SENNOSIDES 8.6 MG: 8.6 TABLET, COATED ORAL at 21:43

## 2022-12-29 RX ADMIN — FOLIC ACID 500 MCG: 1 TABLET ORAL at 10:51

## 2022-12-29 RX ADMIN — ATORVASTATIN CALCIUM 10 MG: 10 TABLET, FILM COATED ORAL at 10:51

## 2022-12-29 RX ADMIN — MONTELUKAST SODIUM 10 MG: 10 TABLET ORAL at 21:43

## 2022-12-29 RX ADMIN — POLYETHYLENE GLYCOL 3350 17 G: 17 POWDER, FOR SOLUTION ORAL at 10:51

## 2022-12-29 RX ADMIN — DOCUSATE SODIUM 100 MG: 100 CAPSULE, LIQUID FILLED ORAL at 10:51

## 2022-12-29 RX ADMIN — BUPROPION HYDROCHLORIDE 200 MG: 100 TABLET, FILM COATED, EXTENDED RELEASE ORAL at 10:51

## 2022-12-29 RX ADMIN — CALCITRIOL CAPSULES 0.25 MCG 0.25 MCG: 0.25 CAPSULE ORAL at 10:51

## 2022-12-29 RX ADMIN — AMLODIPINE BESYLATE 5 MG: 5 TABLET ORAL at 10:51

## 2022-12-29 RX ADMIN — SODIUM CHLORIDE, PRESERVATIVE FREE 10 ML: 5 INJECTION INTRAVENOUS at 21:44

## 2022-12-29 RX ADMIN — ONDANSETRON 4 MG: 4 TABLET, ORALLY DISINTEGRATING ORAL at 10:52

## 2022-12-29 RX ADMIN — SODIUM CHLORIDE, PRESERVATIVE FREE 10 ML: 5 INJECTION INTRAVENOUS at 10:51

## 2022-12-29 ASSESSMENT — PAIN SCALES - GENERAL: PAINLEVEL_OUTOF10: 0

## 2022-12-29 NOTE — CARE COORDINATION
12/29/22, 10:19 AM EST    Home with . Reviewing [facility] list, would like PT/OT and nursing from [facility]. Post-acute [facility]) provider list was provided to patient. Patient was informed of their freedom to choose [facility] provider. Discussed and offered to show the patient the relevant [facility] Providers quality and resource use measures on Medicare Compare web site via computer based on patient's goals of care and treatment preferences. Questions regarding selection process were answered. Patient goals/plan/ treatment preferences discussed by  and . Patient goals/plan/ treatment preferences reviewed with patient/ family. Patient/ family verbalize understanding of discharge plan and are in agreement with goal/plan/treatment preferences. Understanding was demonstrated using the teach back method. AVS provided by RN at time of discharge, which includes all necessary medical information pertaining to the patients current course of illness, treatment, post-discharge goals of care, and treatment preferences. Pt verbalized understanding and gave permission for possible discharge within 4 hours of receiving IMM.      Services At/After Discharge: Home Health, new OP HD at [facility] MW 3:10pm       IMM Letter  IMM Letter given to Patient/Family/Significant other/Guardian/POA/by[de-identified] Lit Bacon CM  IMM Letter date given[de-identified] 12/29/22  IMM Letter time given[de-identified] 1010

## 2022-12-29 NOTE — FLOWSHEET NOTE
12/29/22 1020   Safe Environment   Safety Measures Other (comment)  (vn safety check , audible conversation in room , staff / pt  did not interrupt)

## 2022-12-29 NOTE — PROGRESS NOTES
[address] [address]  INPATIENT PHYSICAL THERAPY  DAILY NOTE  [facility] RENAL TELEMETRY 6K - [id number]    Time In: 1009  Time Out: 1030  Timed Code Treatment Minutes: 15 Minutes  Minutes: 21          Date: 2022  Patient Name: Ty Rojas,  Gender:  female        MRN: [id number]  : 1955  (69 y.o.)     Referring Practitioner: CARLOS MANUEL Loza CNP  Diagnosis: dehydration  Additional Pertinent Hx: Per EMR \"Deloris Rogers is a 69 y.o. female who presents to the emergency department for evaluation of, vomiting  Patient presents to the emergency department after being seen in the office because of 4 days of multiple episodes of nausea, emesis, not tolerating oral intake, lower abdominal pain, fatigue, feeling dehydrated; patient was diagnosed with sinusitis being prescribed with doxycycline some days ago, symptoms referred at that time were rhinorrhea, cough, mild shortness of breath; symptoms did not improve and were joint with nausea emesis and abdominal pain. Symptoms low urinary output, dark urine. Was concerned about kidney failure due to history of CKD. \" --- temporary to tunneled dialysis catheter and renal biopsy in CT . Prior Level of Function:  Lives With: Spouse  Type of Home: House  Home Layout: One level  Home Access: Stairs to enter without rails  Entrance Stairs - Number of Steps: 2 MARIANA  Home Equipment: Juan Jose Quince, Rollator   Bathroom Shower/Tub: Tub/Shower unit  Bathroom Toilet: Standard  Bathroom Equipment: Shower chair    ADL Assistance: Independent  Homemaking Assistance: Needs assistance  Ambulation Assistance: Independent  Transfer Assistance: Independent  Active : Yes (short distances)  IADL Comments:  completes most cooking and cleaning tasks  Additional Comments: pt was intermittently using a walker prior to feeling weak and sick, has been using it more in the last 2 weeks.     Restrictions/Precautions:  Restrictions/Precautions: General Precautions, Fall Risk  Position Activity Restriction  Other position/activity restrictions: tremors     SUBJECTIVE: RN approved session. Pt in bed upon arrival and agrees to therapy. Pt agrees to get up and use restroom with some encouragement. PAIN: not assessed    Vitals: Vitals not assessed per clinical judgement, see nursing flowsheet    OBJECTIVE:  Bed Mobility:  Supine to Sit: Stand By Assistance  Sit to Supine: Stand By Assistance   Scooting: Stand By Assistance    Transfers:  Sit to Stand: [employer], impulsive, moving quickly  Stand to [employer], with verbal cues, decreased safety awareness    Ambulation:  Contact Guard Assistance, with verbal cues , with increased time for completion  Distance: 15ftx2  Surface: Level Tile  Device:Rolling Walker  Gait Deviations: Forward Flexed Posture, Slow Anna, Decreased Step Length Bilaterally, Decreased Gait Speed, Decreased Heel Strike Bilaterally, Mild Path Deviations, Unsteady Gait, and Decreased Terminal Knee Extension    Balance:  Pt completed functional tasks in bathroom with assist for stability and safety. Pt able to complete reaching tasks with release from AD. Pt stood for ~2 mins. Pt demos 0 LOB. Exercise:  Patient was guided in 1 set(s) 10 reps of exercise to both lower extremities. Provided and reviewed HEP . Exercises were completed for increased independence with functional mobility. Functional Outcome Measures: Completed  AM-PAC Inpatient Mobility without Stair Climbing Raw Score : 15  AM-PAC Inpatient without Stair Climbing T-Scale Score : 43.03    ASSESSMENT:  Assessment: Patient progressing toward established goals. Activity Tolerance:  Patient tolerance of  treatment: fair. Pt demos decreased strength, endurance, and independence with mobility. Pt unsteady on feet and requires hands on assist for safety with mobility.  Pt will benefit from cont PT at this time to ensure safety, to decrease the risk for falls and to return to PLOF. Equipment Recommendations:Equipment Needed: Yes  Mobility Devices: Walker  Other: may need RW, pts 4ww is lopsided and brakes not functioning correctly- unsafe for use. cont to assess RW vs 4ww  Discharge Recommendations: 24 hour assistance or supervision and Patient would benefit from continued PT at discharge  Plan: Current Treatment Recommendations: Strengthening, Balance training, Gait training, Stair training, Functional mobility training, Transfer training, Neuromuscular re-education, Endurance training, Equipment evaluation, education, & procurement, Patient/Caregiver education & training, Safety education & training, Therapeutic activities, Home exercise program  General Plan:  (5x GM)    Patient Education  Patient Education: Plan of Care, Home Exercise Program, Bed Mobility, Transfers, Gait, Verbal Exercise Instruction, Education Related to Prevention of Recurrence of Impairment/Illness/Injury, Health Promotion and Wellness Education, Home Safety Education, family education    Goals:  Patient Goals : therapy services HH vs outpatient  Short Term Goals  Time Frame for Short Term Goals: by discharge  Short Term Goal 1: Pt will amb for >100 feet with S with RW for support to return home safely. Short Term Goal 2: Pt will demo sit to/from stand transfers with RW for support with IND to return home safely. Short Term Goal 3: Pt will demo SBA for stair negotiation with rail for support as needed to return home safely. Short Term Goal 4: Pt will demo IND with bed mobility tasks with bed flat to return home safely. Long Term Goals  Time Frame for Long Term Goals : NA due to short ELOS    Following session, patient left in safe position with all fall risk precautions in place.

## 2022-12-29 NOTE — PROGRESS NOTES
Hospitalist Progress Note      Patient: Ty Rojas      Unit/Bed:6K-06/006-A    YOB: 1955    MRN: [id number]       Acct: [de-identified]     PCP: Bella Delgadillo MD    Date of Admission: 12/16/2022    Date/Time of Evaluation:  12/29/2022 at 8:29 AM    Assessment/Plan:    ERUM on CKD stage IV: apprec renal assist- ?etiology but suspect due to severe volume depletion per renal  Creat trending down to 6.3 on 12/20 but up again to 7.2 on 12/21 and 6.9 on 12/22 --> overall down from 8.2 on 12/16 --> Baseline creatinine range ~ 1.8-2.0 in past year   Since not improving per renal 12/22 temp HD catheter and started HD 12/22 and another run 12/23  -> creat down to 3.0 on12/24 but going back up to  5.7 on 12/26 and s/p HD 12/26 and creat down to 3.3 on 12/29/2022   Plan for tunneled HD catheter per d/w Dr Nick Ovalles   IVF held 12/23 per renal  ?? renal bx -->  cont hold ASA, plavix until next week per his rec for possible procedures (?tunneled tessio, ? renal bx)  IVF adjusted from bicarb gtt (12/16 - 12/19) and changed to NS 12/19 at 100 mL and creat cont worsen 12/21 --> HD started 12/22 per renal - IVF stopped 12/23  Renal u/s 12/20/22 with echogenic bilateral kidneys c/w chronic kidney disease  Serologic w/u 12/19 -> C3 little low, C4 normal , Antinuclear Ab Hep-2, IGG (-), myeloperoxidase (-), serine protease IgG (-), elevated kappa/lambda light chains, immunofixation normal, GBM Ab (-), ANCA (-), LUCINA (+)-> reflex (p)  Avoid nephrotoxic agents, renally dose medications - s/p bumex 2 mg po x 1 on 12/18  Strict I&O's  High AGMA, improving:   Likely due to ERUM/dehydration. On bicarb drip 12/16 - 12/19 as improved to WNL 12/19 and changed to NS 12/20 per renal and IVF held since 12/23  Improving since HD also started 12/22  No signs of infxn -- Lactic acid 0.8 12/16 and 1.1 on 12/20  Nephrology following  Hemoptysis, resolved  Patient noted to have 3 episodes of hemoptysis with coughing.    CT chest 12/17/22 with minimal atelectatic/fibrotic stranding left lung base and lingula. Sputum culture ordered but unable to obtain  Pulmonology consulted, appreciate recs: \"likely combination of recent URI dry humidity and use of plavix and ASA, Pulmonary service will sign off no follow-up needed \"   Hypokalemia   Replaced and improved - monitor with ERUM   Hypocalcemia  Daily calcium replacement prn  Persistently low - ?add tums- ?need PTH, vit D testing - will leave up to renal  Hypernatremia  Improved -- Secondary to #1, dehydration, nephrology following  Hypomagnesemia  Was on Po mag oxide starting 12/17 -> also given IV Mg 12/19 and then po Mg stopped 12/19 due to improved Mg  Cont monitor Mg prn  Hyperphosphatemia  Per renal no need for phosphorus binders at this time. D/t ERUM, should resolve as ERUM improves. Monitor prn as had improved to WNL on 12/22   Elevated troponin   0.033 on admission x 1 12/16 -> not repeated, EKG no acute ischemic changes. Patient denies chest pain - likely related to ERUM and electrolyte abn   Monitor for cardiac sx - no recent ischemic w/u and last echo 2/2022 with normal EF, no WMA  Abdominal pain, nausea, vomiting:   Patient reports N/V on admission - ?due to not being able to eat but more likely due to ERUM? ? Improving since 12/21  LFT 12/19/22 WNL  CT chest 12/17/22 with \"distended gallbladder likely related to not having eaten recently\" --> cont with nausea with eating   Check RUQ u/s 12/26 with numerous small non-vascular echogenic foci adjacent to GB wall thought to represent adherent sludge -> ??need HIDA scan  Stool softners, as needed miralax   As needed antiemetics zofran, phenergan IM prn   URI - sx improved  CXR on admission negative for pulmonary infiltrates or effusions. Patient has been on doxycycline started on 12/12. No evidence of bacterial infection, thus no further antibiotics. Supportive care.   Thrombocytopenia - resolved  Plts tended down from admission -- 131 on admission 12/17 -> down to 85 low on 12/21 and starting to trend up to normal since 12/25  ?etiology - ?plavix but Plts normal on admission and down - no signs of sepsis, ? DIC with renal failure but improving 12/23 - no other meds to contrib  Acute on chronic macrocytic anemia  Trending down during admission and Down to 6.7 on 12/21 am -> repeat 7.0  12/21 and down again 6.8 on 12/22 -> transfused 1 unit PRBC 12/22 as need for HD and hgb up to 8.4 on 12/25 and stable 8.8 on 12/29/2022    ? PPI/pepcid  down from 10.3 on 12/16 --> Baseline hemoglobin range ~10-11.    ?some slight loss with hemoptysis and per pulm felt due to epistaxis -> has resolved at least since 12/21  ASA, Plavix held starting 12/21 per renal for possible procedures needed   Suspect likely dilutional component from IVF compounded by poor kidney fxn. FOBT (-) 12/19  Iron studies notable for ferritin 402, iron 145, iron sat 90, TIBC less than 162, folate normal, vitamin B12 greater than 2000  Trend and monitor CBC. Retacrit started 12/21 per renal  hold heparin for DVT proph 12/22 and resume once hgb stable  Chronic HFpEF, compensated:   Echo (2/15/2022) notable for EF 55 to 60%, no WMA, G1 DD, mild tricuspid regurg. Continue ASA, statin, Plavix, amlodipine.     Daily weights, strict I&O's -- fluid mgmt per renal with ERUM and starting HD 12/22  Cardiac diet, 2G sodium  Essential hypertension, controlled:   Continue amlodipine 5 mg daily -> ?increase to 10 mg but monitor with HD started 12/22  Cont prn hydralazine  Monitor and adjust prn  (+) LUCINA   (+) on 12/19 -> reflex (p) - further rheum DS-DNA, SS, smooth AB labs sent  Hyperlipidemia:   Cont home Statin  Depression/anxiety:   Continue Wellbutrin, trazodone  ?on librax at home  History of CVA  No new sx  Continued on aspirin, Plavix, statin -> ASA, plavix held starting 12/21 for possible renal procedures and monitor neuro status  Mild MR, mild TR -- per echo 2/2022  Generalized weakness  Pt/OT following - monitor progress      . Chief Complaint: Dehydration     Hospital Course:   Per CNP Connie Hait note 12/20 \"Per HPI documented 12/16/2022: Rohit Aguilar is a 69 y.o. female with PMHx of CVA, depression, who presented to [facility] with chief complaint of dehydration, N/V. Patient states that she was seen by her PCP on Monday, 12/12 for congestion, sore throat, fatigue, weakness on going for >2 weeks. She was started on PO doxycycline. She continues to feel congested but has no sinus pain or pressure, no cough, afebrile. Approx 2 days after starting doxy, patient developed N/V. Reports very poor PO intake, states she has only been eating ice chips. Reports lower abdominal pain, last BM 3 days ago. Reports decreased urine output, states it is very dark. Denies dysuria, urgency, frequency. No chest pain, SOB, f/c. Pt follows with Dr. Matthew Lake for CKD. Admitted to med/tele for further management. \"     12/17/22: Resumed care of patient today. Patient afebrile, satting 94% on room air, with hemodynamically stable vital signs. Patient stating she feels weak and tired this morning. States that she still feels nauseous and when she ate some breakfast this morning she started dry heaving and almost vomited. States that she has very little appetite. Also reported some intermittent abdominal cramping. States she is passing gas. States her last bowel movement was Tuesday. Stool softeners added. Multiple electrolyte derangements as noted above. Creatinine 7.3 today, down from 8.2. Discussed case with Dr. Inez Butterfield nephrology, who recommend decreasing bicarb drip to rate of 100 mL/hr. Patient making urine 900 mL OP overnight. 12/18/22: Afebrile, hemodynamically stable. No acute events overnight. Patient stating she feels a little better this morning but still feels weak. States that she has not had any nausea or vomiting since yesterday.   States she had 2 medium solid BMs and no longer has abdominal pain. States she ate all her breakfast.  Creatinine at 7.3 today, no improvement. Ionized calcium still remains low, will continue to replete. Potassium magnesium still low, will continue to replete. Continue IV bicarb drip.     12/19/22: Afebrile, satting 97% on room air, hemodynamically stable. Patient states she is feeling better. Reports no more hemoptysis. States that she still having some nausea and I eating much of her meals. Denies vomiting and abdominal pains. No other complaints. Creatinine 7.1 today. Slow improvement. Continue to correct electrolyte derangements as noted above. Continue IV bicarb drip     12/20/22: Hemodynamically stable. No acute events. Creatinine downtrending, 6.6 today. H&H fluctuating but stable. Phosphorus coming down. Ionized calcium still low, will replace again today. Potassium and magnesium okay will withhold replacement today. Patient stated that she did have acute episode of nausea followed by vomiting earlier this morning after eating her breakfast.  Currently states that she does not feel nauseous and has no complaints at this time. Continue IV fluids. \"    12/21: n/v improving, creat up to 7.2 again -> IVF adjusted  12/19 per renal as CO2 improving but renal fxn not improving - holding ASA, plavix in prep for possible bx, HD catheter. 12/22 -> creat still 6.9 -> per renal temp HD catheter placed and started on HD  12/23 -> HD ran again per renal - creat down to 3.9   12/24 -> creat down to 3.0  12/25 -> creat up to 4.5 with last HD 12/23 12/26 -> creat 5.7 and HD run 12/26; c/o nausea with eating although better    12.27.2022 pt states she is doing ok this am - cont with nausea with eating but no assoicated abd pain or nausea but overall improving. Denies any cp, sob. On RA. Afebrile. Mouth sores improving. No lightheaded/dizziness. No accurate I/O.   Last BM 12/23 x 2.  apprec renal assist -- creat improves with HD -- d/w  Mudadda and plan for tunneled HD catheter and likely renal bx  -> lytes, h/h stable. Await further renal plan. Cont with nausea with eating --> ? ?HIDA, ?need GI eval as ?if this lead to dehydration and ERUM. Cont monitor lytes, renal fxn, u/o, BP, h/h, plts, and cont to increase activity with PT/OT    12.28.2022 patient seen this am Cr 4.9, hgb stable, discussed during collaborative rounds getting renal biopsy today along with tunnel catheter HD, possibly discharge in a.m.    12.29.2022 patient seen this a.m. complains of UTI symptoms we will obtain a urine microscopy and urinalysis. Patient still would like to go home and be treated on outpatient basis if she does have a UTI. Discharge if okay with nephrology with follow-up      PMH, SURGICAL HX, FH, SOCIAL HX reviewed and updated as needed.     Medications:  Reviewed    Infusion Medications    sodium chloride Stopped (12/28/22 1006)    sodium chloride      [Held by provider] sodium chloride Stopped (12/23/22 1525)    sodium chloride       Scheduled Medications    epoetin mumtaz-epbx  2,000 Units SubCUTAneous Once per day on Mon Wed Fri    And    epoetin mumtaz-epbx  3,000 Units SubCUTAneous Once per day on Mon Wed Fri    [Held by provider] magnesium oxide  400 mg Oral BID    calcium replacement protocol   Other RX Placeholder    calcitRIOL  0.25 mcg Oral Once per day on Sun Tue Thu Sat    docusate sodium  100 mg Oral Daily    senna  1 tablet Oral Nightly    [Held by provider] potassium bicarb-citric acid  40 mEq Oral Daily    amLODIPine  5 mg Oral Daily    [Held by provider] aspirin  81 mg Oral Daily    buPROPion  200 mg Oral BID    [Held by provider] clopidogrel  75 mg Oral Daily    folic acid  549 mcg Oral Daily    montelukast  10 mg Oral Daily    atorvastatin  10 mg Oral Daily    traZODone  50 mg Oral Nightly    polyethylene glycol  17 g Oral Daily    sodium chloride flush  10 mL IntraVENous 2 times per day    [Held by provider] heparin (porcine)  5,000 Units SubCUTAneous 3 times per day     PRN Meds: sodium chloride, promethazine, chlordiazePOXIDE-clidinium, fluticasone, simethicone, sodium chloride flush, sodium chloride, ondansetron **OR** ondansetron, acetaminophen **OR** acetaminophen, dextromethorphan-guaiFENesin, cetirizine, hydrALAZINE      Intake/Output Summary (Last 24 hours) at 12/29/2022 0829  Last data filed at 12/28/2022 2104  Gross per 24 hour   Intake 410 ml   Output 1900 ml   Net -1490 ml       Diet:  ADULT DIET; Regular; Low Fat/Low Chol/High Fiber/2 gm Na; Less than 60 gm    Exam:  BP (!) 144/96   Pulse 90   Temp 97.7 °F (36.5 °C) (Oral)   Resp 16   Ht 5' (1.524 m)   Wt 115 lb 11.9 oz (52.5 kg)   SpO2 100%   BMI 22.60 kg/m²     General appearance: No apparent distress, appears older than stated age and cooperative. Oriented x 3. HEENT: Pupils equal, round, and reactive to light. Conjunctivae/corneas clear. MM dry with some cracking on lips - no vesicles but some dried scabs on upper vermillion border and left lower lip  Neck: Supple, with full range of motion. Trachea midline. Respiratory:  Normal respiratory effort. Diminished, few rales in left bases, no wheezing, no rhonchi. No respiratory distress or accessory muscle use. Cardiovascular: Regular rate and rhythm with normal S1/S2, 2/6 DAVONTE LUSB,  No JVD. Abdomen: Soft, slight diffuse but alondra mid upper abd TTP,  non-distended with normal bowel sounds. No rebound or guarding  Musculoskeletal: No clubbing, cyanosis or edema bilaterally. Full range of motion without deformity. No calf tenderness palpation  Skin: Skin color, texture, turgor normal.  No rashes or lesions. Neurologic:  Neurovascularly intact without any focal sensory/motor deficits.  Cranial nerves: II-XII intact, grossly non-focal.  Psychiatric: Alert and oriented, flat affect, thought content appropriate, normal insight  Capillary Refill: Brisk,< 3 seconds   Peripheral Pulses: +2 palpable, equal bilaterally       All labs reviewed and interpreted by me:  Labs:   Recent Labs     12/27/22  0438 12/28/22  0547 12/29/22  0530   WBC 9.2 9.2 10.3   HGB 8.8* 8.6* 8.5*   HCT 27.9* 27.2* 26.8*    265 242     Recent Labs     12/27/22  0438 12/28/22  0547 12/29/22  0530    141 141   K 4.2 4.2 4.8    104 105   CO2 24 25 26   BUN 15 26* 14   CREATININE 3.3* 4.9* 3.3*   CALCIUM 8.5 8.2* 8.0*     Recent Labs     12/27/22  0438   AST 20   ALT 20   BILITOT 0.3   ALKPHOS 153*       Recent Labs     12/27/22  0438 12/28/22  0547   INR 0.92 0.92     No results for input(s): CKTOTAL, TROPONINT in the last 72 hours. Urinalysis:      Lab Results   Component Value Date/Time    NITRU NEGATIVE 12/16/2022 05:20 PM    WBCUA 10-15 12/16/2022 05:20 PM    BACTERIA NONE SEEN 12/16/2022 05:20 PM    RBCUA 5-10 12/16/2022 05:20 PM    BLOODU TRACE 12/16/2022 05:20 PM    SPECGRAV 1.010 12/16/2022 05:20 PM    GLUCOSEU Negative 07/16/2021 04:09 PM    GLUCOSEU NEGATIVE 06/09/2020 03:03 PM       All radiology images and reports reviewed and interpreted by me:  Radiology:  CT BIOPSY RENAL   Final Result    Successful CT-guided random renal biopsy of the right kidney. **This report has been created using voice recognition software. It may contain minor errors which are inherent in voice recognition technology. **      Final report electronically signed by Dr. Sheilah Pallas, MD on 12/28/2022 10:27 AM      CT GUIDED NEEDLE PLACEMENT   Final Result    Successful CT-guided random renal biopsy of the right kidney. **This report has been created using voice recognition software. It may contain minor errors which are inherent in voice recognition technology. **      Final report electronically signed by Dr. Sheilah Pallas, MD on 12/28/2022 10:27 AM      IR FLUORO GUIDED NEEDLE PLACEMENT   Final Result   Status post removal of the indwelling non-tunneled dialysis catheter and replacement with a new tunneled dialysis catheter, as outlined above. **This report has been created using voice recognition software. It may contain minor errors which are inherent in voice recognition technology. **      Final report electronically signed by Dr. Laurann Severe on 12/28/2022 9:04 AM      [address]   Final Result   Numerous small nonvascular echogenic foci adherent to the gallbladder wall thought to represent adherent sludge. **This report has been created using voice recognition software. It may contain minor errors which are inherent in voice recognition technology. **      Final report electronically signed by Dr Angela Núñez on 12/26/2022 3:27 PM      IR FLUORO GUIDED CVA DEVICE PLMT/REPLACE/REMOVAL   Final Result   Status post successful nontunneled dialysis catheter insertion. **This report has been created using voice recognition software. It may contain minor errors which are inherent in voice recognition technology. **      Final report electronically signed by Dr Angela Núñez on 12/22/2022 1:41 PM      US RENAL COMPLETE   Final Result   Impression:   Echogenic bilateral kidneys, this represents chronic kidney disease. This document has been electronically signed by: Yordan Balbuena MD on    12/20/2022 08:27 PM      CT CHEST WO CONTRAST   Final Result   Minimal atelectatic/fibrotic stranding left lung base and lingula. Small hiatus hernia. Distended gallbladder, likely related to not having eaten recently. **This report has been created using voice recognition software. It may contain minor errors which are inherent in voice recognition technology. **      Final report electronically signed by Dr. Laurann Severe on 12/17/2022 5:56 PM      XR CHEST (2 VW)   Final Result   1. No interval change since previous study dated 9/18/2021. Vida Melgar **This report has been created using voice recognition software.  It may contain minor errors which are inherent in voice recognition technology. **      Final report electronically signed by DR Sergey Smith on 12/16/2022 11:36 AM          Diet: ADULT DIET; Regular; Low Fat/Low Chol/High Fiber/2 gm Na; Less than 60 gm    Smith: no    Microbiology:  throat cx 12/16 (-)    Covid 12/16 (-), influenza 12/16 (-)    Urine cx 12/17 = contaminants    Tele review last 24 hrs:  SR, no arrhythmias    DVT prophylaxis: [] Lovenox                                 [] SCDs                                 [x] SQ Heparin                                 [] Encourage ambulation           [] Already on Anticoagulation     Disposition:    [x] Home with ? [facility]       [] TCU       [] Rehab       [] Psych       [] SNF       [] [place]       [] Other-    Code Status: Full Code    PT/OT Eval Status: following      Electronically signed by Kasey Jane DO on 12/29/2022 at 8:29 AM

## 2022-12-29 NOTE — PLAN OF CARE
Problem: Discharge Planning  Goal: Discharge to home or other facility with appropriate resources  Outcome: Progressing  Flowsheets (Taken 12/29/2022 1310)  Discharge to home or other facility with appropriate resources: Identify barriers to discharge with patient and caregiver     Problem: Skin/Tissue Integrity  Goal: Absence of new skin breakdown  Description: 1. Monitor for areas of redness and/or skin breakdown  2. Assess vascular access sites hourly  3. Every 4-6 hours minimum:  Change oxygen saturation probe site  4. Every 4-6 hours:  If on nasal continuous positive airway pressure, respiratory therapy assess nares and determine need for appliance change or resting period. Outcome: Progressing  Note: No new skin issues. Will continue to monitor.      Problem: Safety - Adult  Goal: Free from fall injury  Outcome: Progressing  Flowsheets (Taken 12/29/2022 1310)  Free From Fall Injury: Instruct family/caregiver on patient safety     Problem: ABCDS Injury Assessment  Goal: Absence of physical injury  Outcome: Progressing     Problem: Metabolic/Fluid and Electrolytes - Adult  Goal: Electrolytes maintained within normal limits  Outcome: Progressing  Flowsheets (Taken 12/29/2022 1310)  Electrolytes maintained within normal limits: Monitor labs and assess patient for signs and symptoms of electrolyte imbalances     Problem: Metabolic/Fluid and Electrolytes - Adult  Goal: Hemodynamic stability and optimal renal function maintained  Outcome: Progressing  Flowsheets (Taken 12/29/2022 1310)  Hemodynamic stability and optimal renal function maintained: Monitor labs and assess for signs and symptoms of volume excess or deficit     Problem: Chronic Conditions and Co-morbidities  Goal: Patient's chronic conditions and co-morbidity symptoms are monitored and maintained or improved  Outcome: Progressing  Flowsheets (Taken 12/28/2022 1037)  Care Plan - Patient's Chronic Conditions and Co-Morbidity Symptoms are Monitored and Maintained or Improved: Monitor and assess patient's chronic conditions and comorbid symptoms for stability, deterioration, or improvement     Problem: Nutrition Deficit:  Goal: Optimize nutritional status  Outcome: Progressing  Flowsheets (Taken 12/29/2022 1310)  Nutrient intake appropriate for improving, restoring, or maintaining nutritional needs:   Assess nutritional status and recommend course of action   Monitor oral intake, labs, and treatment plans     Problem: Musculoskeletal - Adult  Goal: Return mobility to safest level of function  Outcome: Progressing  Flowsheets (Taken 12/29/2022 1310)  Return Mobility to Safest Level of Function:   Assess patient stability and activity tolerance for standing, transferring and ambulating with or without assistive devices   Assist with transfers and ambulation using safe patient handling equipment as needed     Problem: Skin/Tissue Integrity - Adult  Goal: Skin integrity remains intact  Recent Flowsheet Documentation  Taken 12/29/2022 1306 by Laura Craft RN  Skin Integrity Remains Intact: Monitor for areas of redness and/or skin breakdown    Care plan reviewed with patient. Patient verbalize understanding of the plan of care and contribute to goal setting.

## 2022-12-29 NOTE — PROGRESS NOTES
Physician Progress Note      PATIENT:               Colton Zhong  CSN #:                  [id number]  :                       1955  ADMIT DATE:       2022 10:35 AM  DISCH DATE:  RESPONDING  PROVIDER #:        Fouzia Newman MD          QUERY TEXT:    Patient admitted with ERUM. Documentation reflects possible ATN in note(s)   dated -. If possible, please document in the progress notes and   discharge summary if ATN was: The medical record reflects the following:  Risk Factors: ERUM  Clinical Indicators: Cr trend since arrival 7.3, 7.1, 6.6, 6.3, 7.2, 6.9, 3.9,   3.0, 4.5, 5.7, 3.3, 4.9  Treatment: labs, imaging, IVF, monitoring, dialysis  Options provided:  -- ERUM with ATN confirmed after study  -- ERUM with ATN treated and resolved  -- ATN ruled out after study, ERUM only  -- Other - I will add my own diagnosis  -- Disagree - Not applicable / Not valid  -- Disagree - Clinically unable to determine / Unknown  -- Refer to Clinical Documentation Reviewer    PROVIDER RESPONSE TEXT:    Provider is clinically unable to determine a response to this query. await biopsy    Query created by: Terry Ferreira on 2022 7:43 AM      Electronically signed by:   Fouzia Newman MD 2022 11:32 AM

## 2022-12-29 NOTE — PROGRESS NOTES
. [address] [place] [address]  OCCUPATIONAL THERAPY MISSED TREATMENT NOTE  [facility]LE[facility] 6K  6K-006-A      Date: 2022  Patient Name: Preet Holliday        CSN: [id number]   : 1955  (69 y.o.)  Gender: female   Referring Practitioner: CARLOS MANUEL Murdock CNP  Diagnosis: Acute kidney injury superimposed on chronic kidney disease         REASON FOR MISSED TREATMENT: Patient Refused.   Patient reported having a rough night

## 2022-12-30 VITALS
TEMPERATURE: 98.5 F | RESPIRATION RATE: 20 BRPM | SYSTOLIC BLOOD PRESSURE: 116 MMHG | WEIGHT: 109.35 LBS | HEART RATE: 73 BPM | OXYGEN SATURATION: 98 % | HEIGHT: 60 IN | BODY MASS INDEX: 21.47 KG/M2 | DIASTOLIC BLOOD PRESSURE: 80 MMHG

## 2022-12-30 LAB
ANION GAP SERPL CALCULATED.3IONS-SCNC: 13 MEQ/L (ref 8–16)
BUN BLDV-MCNC: 27 MG/DL (ref 7–22)
CALCIUM SERPL-MCNC: 7.2 MG/DL (ref 8.5–10.5)
CHLORIDE BLD-SCNC: 105 MEQ/L (ref 98–111)
CO2: 23 MEQ/L (ref 23–33)
CREAT SERPL-MCNC: 5.3 MG/DL (ref 0.4–1.2)
GFR SERPL CREATININE-BSD FRML MDRD: 8 ML/MIN/1.73M2
GLUCOSE BLD-MCNC: 71 MG/DL (ref 70–108)
POTASSIUM REFLEX MAGNESIUM: 4.6 MEQ/L (ref 3.5–5.2)
SODIUM BLD-SCNC: 141 MEQ/L (ref 135–145)

## 2022-12-30 PROCEDURE — 80048 BASIC METABOLIC PNL TOTAL CA: CPT

## 2022-12-30 PROCEDURE — 6360000002 HC RX W HCPCS: Performed by: INTERNAL MEDICINE

## 2022-12-30 PROCEDURE — 6370000000 HC RX 637 (ALT 250 FOR IP)

## 2022-12-30 PROCEDURE — 90935 HEMODIALYSIS ONE EVALUATION: CPT

## 2022-12-30 PROCEDURE — 36415 COLL VENOUS BLD VENIPUNCTURE: CPT

## 2022-12-30 PROCEDURE — 6370000000 HC RX 637 (ALT 250 FOR IP): Performed by: PHYSICIAN ASSISTANT

## 2022-12-30 RX ORDER — CALCITRIOL 0.25 UG/1
0.25 CAPSULE, LIQUID FILLED ORAL
Qty: 30 CAPSULE | Refills: 3 | Status: SHIPPED | OUTPATIENT
Start: 2022-12-31

## 2022-12-30 RX ORDER — CETIRIZINE HYDROCHLORIDE 5 MG/1
5 TABLET ORAL NIGHTLY PRN
Qty: 30 TABLET | Refills: 0 | Status: SHIPPED | OUTPATIENT
Start: 2022-12-30

## 2022-12-30 RX ADMIN — EPOETIN ALFA-EPBX 2000 UNITS: 2000 INJECTION, SOLUTION INTRAVENOUS; SUBCUTANEOUS at 10:06

## 2022-12-30 RX ADMIN — ATORVASTATIN CALCIUM 10 MG: 10 TABLET, FILM COATED ORAL at 10:01

## 2022-12-30 RX ADMIN — BUPROPION HYDROCHLORIDE 200 MG: 100 TABLET, FILM COATED, EXTENDED RELEASE ORAL at 10:01

## 2022-12-30 RX ADMIN — POLYETHYLENE GLYCOL 3350 17 G: 17 POWDER, FOR SOLUTION ORAL at 10:04

## 2022-12-30 RX ADMIN — DOCUSATE SODIUM 100 MG: 100 CAPSULE, LIQUID FILLED ORAL at 10:04

## 2022-12-30 RX ADMIN — FOLIC ACID 500 MCG: 1 TABLET ORAL at 10:01

## 2022-12-30 RX ADMIN — EPOETIN ALFA-EPBX 3000 UNITS: 3000 INJECTION, SOLUTION INTRAVENOUS; SUBCUTANEOUS at 13:56

## 2022-12-30 NOTE — PROGRESS NOTES
[facility]  PHYSICAL THERAPY MISSED TREATMENT NOTE  [facility] RENAL TELEMETRY 6K    Date: 2022  Patient Name: Meghana Sotelo        MRN: [id number]   : 1955  (69 y.o.)  Gender: female   Referring Practitioner: CARLOS MANUEL Bagley CNP  Diagnosis: dehydration         REASON FOR MISSED TREATMENT:  Patient at testing and/or off unit. Pt off floor for dialysis at time of attempt, per RN pt also to be d/c home today.

## 2022-12-30 NOTE — FLOWSHEET NOTE
12/30/22 1022 12/30/22 1345   Vital Signs   BP (!) 149/85 116/80   Temp 97.5 °F (36.4 °C) 98.5 °F (36.9 °C)   Heart Rate 88 73   Resp 18 20   Weight 111 lb 8.8 oz (50.6 kg) 109 lb 5.6 oz (49.6 kg)   Weight Method Bed scale Bed scale   Percent Weight Change -6.64 -1.98   Post-Hemodialysis Assessment   Post-Treatment Procedures  --  Blood returned;Catheter Capped, clamped with Saline x2 ports   Machine Disinfection Process  --  Acid/Vinegar Clean;Heat Disinfect; Exterior Machine Disinfection   Blood Volume Processed (Liters)  --  51.9 l/min   Dialyzer Clearance  --  Lightly streaked   Duration of Treatment (minutes)  --  180 minutes   Heparin Amount Administered During Treatment (mL)  --  0 mL   Hemodialysis Intake (ml)  --  400 ml   Hemodialysis Output (ml)  --  1400 ml   NET Removed (ml)  --  1000   Tolerated Treatment  --  Good   3 hour treatment completed. 1 L of fluid removed. Cath lines flushed with 10 ml of 0.9 NS, clamped and tego secured. Report given to primary RN. Charting printed and placed in bin to be scanned into EMR.

## 2022-12-30 NOTE — PLAN OF CARE
Problem: Discharge Planning  Goal: Discharge to home or other facility with appropriate resources  12/30/2022 0253 by Barber Santoro RN  Outcome: Progressing  12/29/2022 1310 by Trinity Luna RN  Outcome: Progressing  Flowsheets (Taken 12/29/2022 1310)  Discharge to home or other facility with appropriate resources: Identify barriers to discharge with patient and caregiver     Problem: Skin/Tissue Integrity  Goal: Absence of new skin breakdown  Description: 1. Monitor for areas of redness and/or skin breakdown  2. Assess vascular access sites hourly  3. Every 4-6 hours minimum:  Change oxygen saturation probe site  4. Every 4-6 hours:  If on nasal continuous positive airway pressure, respiratory therapy assess nares and determine need for appliance change or resting period. 12/30/2022 0253 by Barber Santoro RN  Outcome: Progressing  12/29/2022 1310 by Trinity Luna RN  Outcome: Progressing  Note: No new skin issues. Will continue to monitor.      Problem: Safety - Adult  Goal: Free from fall injury  12/30/2022 0253 by Barber Santoro RN  Outcome: Progressing  12/29/2022 1310 by Trinity Luna RN  Outcome: Progressing  Flowsheets (Taken 12/29/2022 1310)  Free From Fall Injury: Instruct family/caregiver on patient safety     Problem: ABCDS Injury Assessment  Goal: Absence of physical injury  12/30/2022 0253 by Barber Santoro RN  Outcome: Progressing  12/29/2022 1310 by Trinity Luna RN  Outcome: Progressing     Problem: Metabolic/Fluid and Electrolytes - Adult  Goal: Electrolytes maintained within normal limits  12/30/2022 0253 by Barber Santoro RN  Outcome: Progressing  12/29/2022 1310 by Trinity Luna RN  Outcome: Progressing  Flowsheets (Taken 12/29/2022 1310)  Electrolytes maintained within normal limits: Monitor labs and assess patient for signs and symptoms of electrolyte imbalances  Goal: Hemodynamic stability and optimal renal function maintained  12/30/2022 0253 by Barber Santoro RN  Outcome: Progressing  12/29/2022 1310 by Magalie Fraire RN  Outcome: Progressing  Flowsheets (Taken 12/29/2022 1310)  Hemodynamic stability and optimal renal function maintained: Monitor labs and assess for signs and symptoms of volume excess or deficit     Problem: Chronic Conditions and Co-morbidities  Goal: Patient's chronic conditions and co-morbidity symptoms are monitored and maintained or improved  12/30/2022 0253 by Kiki Matias RN  Outcome: Progressing  12/29/2022 1310 by Magalie Fraire RN  Outcome: Progressing  Flowsheets (Taken 12/28/2022 1037)  Care Plan - Patient's Chronic Conditions and Co-Morbidity Symptoms are Monitored and Maintained or Improved: Monitor and assess patient's chronic conditions and comorbid symptoms for stability, deterioration, or improvement     Problem: Nutrition Deficit:  Goal: Optimize nutritional status  12/30/2022 0253 by Kiki Matias RN  Outcome: Progressing  12/29/2022 1310 by Magalie Fraire RN  Outcome: Progressing  Flowsheets (Taken 12/29/2022 1310)  Nutrient intake appropriate for improving, restoring, or maintaining nutritional needs:   Assess nutritional status and recommend course of action   Monitor oral intake, labs, and treatment plans     Problem: Skin/Tissue Integrity - Adult  Goal: Skin integrity remains intact  Outcome: Progressing  Flowsheets (Taken 12/29/2022 1306 by Magalie Fraire RN)  Skin Integrity Remains Intact: Monitor for areas of redness and/or skin breakdown     Problem: Musculoskeletal - Adult  Goal: Return mobility to safest level of function  12/30/2022 0253 by Kiki Matias RN  Outcome: Progressing  12/29/2022 1310 by Magalie Fraire RN  Outcome: Progressing  Flowsheets (Taken 12/29/2022 1310)  Return Mobility to Safest Level of Function:   Assess patient stability and activity tolerance for standing, transferring and ambulating with or without assistive devices   Assist with transfers and ambulation using safe patient handling equipment as needed

## 2022-12-30 NOTE — DISCHARGE SUMMARY
Hospital Medicine Discharge Summary      Patient Identification:   Myron Vazquez   : 1955  MRN: [id number]   Account: [de-identified]      Patient's PCP: Iman Garza MD    Admit Date: 2022     Discharge Date:   2022    Admitting Physician: No admitting provider for patient encounter. Discharge Physician: Katherin Rubin DO     Discharge Diagnoses:     Active Hospital Problems    Diagnosis Date Noted    ATN (acute tubular necrosis) ([facility] [N17.0] 2022     Priority: Medium    Thrombocytopenia ([facility] [D69.6] 2022     Priority: Medium    Acute on chronic anemia [D64.9] 2022     Priority: Medium    Hypernatremia [E87.0] 2022     Priority: Medium    Abdominal pain [R10.9] 2022     Priority: Medium    Nausea and vomiting [R11.2] 2022     Priority: Medium    Upper respiratory tract infection [J06.9] 2022     Priority: Medium    Chronic diastolic CHF (congestive heart failure) ([facility] [I50.32] 2022     Priority: Medium    Depression [F32.A] 2022     Priority: Medium    Generalized weakness [R53.1] 2022     Priority: Medium    Nosebleed [R04.0] 2022     Priority: Medium    ERUM (acute kidney injury) ([facility] [N17.9] 2022     Priority: Medium    Dehydration [E86.0] 2022     Priority: Medium    Hypokalemia [E87.6] 2022     Priority: Medium    Hypomagnesemia [E83.42] 2022     Priority: Medium    Hyperphosphatemia [E83.39] 2022     Priority: Medium    Hypocalcemia [E83.51] 2022     Priority: Medium    Hemoptysis [R04.2] 2022     Priority: Medium    Acute kidney injury superimposed on chronic kidney disease ([facility] [N17.9, N18.9] 2022     Priority: Medium    Metabolic acidosis [V31.01] 2022     Priority: Medium    Stage 4 chronic kidney disease ([facility] [N18.4] 2018    Anxiety disorder [F41.9] 2012    History of CVA (cerebrovascular accident) [Z86.73] 2012       The patient was seen and examined on day of discharge and this discharge summary is in conjunction with any daily progress note from day of discharge. Hospital Course: Gaby Navarro is a 69 y.o. female admitted to [facility] on 12/16/2022 for dehydration. ERUM on CKD stage IV: apprec renal assist- ?etiology but suspect due to severe volume depletion per renal  Creat trending down to 6.3 on 12/20 but up again to 7.2 on 12/21 and 6.9 on 12/22 --> overall down from 8.2 on 12/16 --> Baseline creatinine range ~ 1.8-2.0 in past year   Since not improving per renal 12/22 temp HD catheter and started HD 12/22 and another run 12/23  -> creat down to 3.0 on12/24 but going back up to  5.7 on 12/26 and s/p HD 12/26 and creat down to 3.3 on 12/29/2022   Plan for tunneled HD catheter per d/w Dr Mcdonough June   IVF held 12/23 per renal  ?? renal bx -->  cont hold ASA, plavix until next week per his rec for possible procedures (?tunneled tessio, ? renal bx)  IVF adjusted from bicarb gtt (12/16 - 12/19) and changed to NS 12/19 at 100 mL and creat cont worsen 12/21 --> HD started 12/22 per renal - IVF stopped 12/23  Renal u/s 12/20/22 with echogenic bilateral kidneys c/w chronic kidney disease  Serologic w/u 12/19 -> C3 little low, C4 normal , Antinuclear Ab Hep-2, IGG (-), myeloperoxidase (-), serine protease IgG (-), elevated kappa/lambda light chains, immunofixation normal, GBM Ab (-), ANCA (-), LUCINA (+)-> reflex (p)  Avoid nephrotoxic agents, renally dose medications - s/p bumex 2 mg po x 1 on 12/18  Strict I&O's  High AGMA, improving:   Likely due to ERUM/dehydration. On bicarb drip 12/16 - 12/19 as improved to WNL 12/19 and changed to NS 12/20 per renal and IVF held since 12/23  Improving since HD also started 12/22  No signs of infxn -- Lactic acid 0.8 12/16 and 1.1 on 12/20  Nephrology following  Hemoptysis, resolved  Patient noted to have 3 episodes of hemoptysis with coughing.    CT chest 12/17/22 with minimal atelectatic/fibrotic stranding left lung base and lingula. Sputum culture ordered but unable to obtain  Pulmonology consulted, appreciate recs: \"likely combination of recent URI dry humidity and use of plavix and ASA, Pulmonary service will sign off no follow-up needed \"   Hypokalemia   Replaced and improved - monitor with ERUM   Hypocalcemia  Daily calcium replacement prn  Persistently low - ?add tums- ?need PTH, vit D testing - will leave up to renal  Hypernatremia  Improved -- Secondary to #1, dehydration, nephrology following  Hypomagnesemia  Was on Po mag oxide starting 12/17 -> also given IV Mg 12/19 and then po Mg stopped 12/19 due to improved Mg  Cont monitor Mg prn  Hyperphosphatemia  Per renal no need for phosphorus binders at this time. D/t ERUM, should resolve as ERUM improves. Monitor prn as had improved to WNL on 12/22   Elevated troponin   0.033 on admission x 1 12/16 -> not repeated, EKG no acute ischemic changes. Patient denies chest pain - likely related to ERUM and electrolyte abn   Monitor for cardiac sx - no recent ischemic w/u and last echo 2/2022 with normal EF, no WMA  Abdominal pain, nausea, vomiting:   Patient reports N/V on admission - ?due to not being able to eat but more likely due to ERUM? ? Improving since 12/21  LFT 12/19/22 WNL  CT chest 12/17/22 with \"distended gallbladder likely related to not having eaten recently\" --> cont with nausea with eating   Check RUQ u/s 12/26 with numerous small non-vascular echogenic foci adjacent to GB wall thought to represent adherent sludge -> ??need HIDA scan  Stool softners, as needed miralax   As needed antiemetics zofran, phenergan IM prn   URI - sx improved  CXR on admission negative for pulmonary infiltrates or effusions. Patient has been on doxycycline started on 12/12. No evidence of bacterial infection, thus no further antibiotics. Supportive care.   Thrombocytopenia - resolved  Plts tended down from admission -- 131 on admission 12/17 -> down to 85 low on 12/21 and starting to trend up to normal since 12/25  ?etiology - ?plavix but Plts normal on admission and down - no signs of sepsis, ? DIC with renal failure but improving 12/23 - no other meds to contrib  Acute on chronic macrocytic anemia  Trending down during admission and Down to 6.7 on 12/21 am -> repeat 7.0  12/21 and down again 6.8 on 12/22 -> transfused 1 unit PRBC 12/22 as need for HD and hgb up to 8.4 on 12/25 and stable 8.8 on 12/29/2022    ? PPI/pepcid  down from 10.3 on 12/16 --> Baseline hemoglobin range ~10-11.    ?some slight loss with hemoptysis and per pulm felt due to epistaxis -> has resolved at least since 12/21  ASA, Plavix held starting 12/21 per renal for possible procedures needed   Suspect likely dilutional component from IVF compounded by poor kidney fxn. FOBT (-) 12/19  Iron studies notable for ferritin 402, iron 145, iron sat 90, TIBC less than 162, folate normal, vitamin B12 greater than 2000  Trend and monitor CBC. Retacrit started 12/21 per renal  hold heparin for DVT proph 12/22 and resume once hgb stable  Chronic HFpEF, compensated:   Echo (2/15/2022) notable for EF 55 to 60%, no WMA, G1 DD, mild tricuspid regurg. Continue ASA, statin, Plavix, amlodipine.     Daily weights, strict I&O's -- fluid mgmt per renal with ERUM and starting HD 12/22  Cardiac diet, 2G sodium  Essential hypertension, controlled:   Continue amlodipine 5 mg daily -> ?increase to 10 mg but monitor with HD started 12/22  Cont prn hydralazine  Monitor and adjust prn  (+) LUCINA   (+) on 12/19 -> reflex (p) - further rheum DS-DNA, SS, smooth AB labs sent  Hyperlipidemia:   Cont home Statin  Depression/anxiety:   Continue Wellbutrin, trazodone  ?on librax at home  History of CVA  No new sx  Continued on aspirin, Plavix, statin -> ASA, plavix held starting 12/21 for possible renal procedures and monitor neuro status  Mild MR, mild TR -- per echo 2/2022  Generalized weakness  Pt/OT following - monitor progress       . Chief Complaint: Dehydration     Hospital Course:   Per CNP Cori Degroot note 12/20 \"Per HPI documented 12/16/2022: Imelda Setting Marice Osgood is a 69 y.o. female with PMHx of CVA, depression, who presented to [facility] with chief complaint of dehydration, N/V. Patient states that she was seen by her PCP on Monday, 12/12 for congestion, sore throat, fatigue, weakness on going for >2 weeks. She was started on PO doxycycline. She continues to feel congested but has no sinus pain or pressure, no cough, afebrile. Approx 2 days after starting doxy, patient developed N/V. Reports very poor PO intake, states she has only been eating ice chips. Reports lower abdominal pain, last BM 3 days ago. Reports decreased urine output, states it is very dark. Denies dysuria, urgency, frequency. No chest pain, SOB, f/c. Pt follows with Dr. Laith Coppola for CKD. Admitted to med/tele for further management. \"     12/17/22: Resumed care of patient today. Patient afebrile, satting 94% on room air, with hemodynamically stable vital signs. Patient stating she feels weak and tired this morning. States that she still feels nauseous and when she ate some breakfast this morning she started dry heaving and almost vomited. States that she has very little appetite. Also reported some intermittent abdominal cramping. States she is passing gas. States her last bowel movement was Tuesday. Stool softeners added. Multiple electrolyte derangements as noted above. Creatinine 7.3 today, down from 8.2. Discussed case with Dr. Ginna Avila nephrology, who recommend decreasing bicarb drip to rate of 100 mL/hr. Patient making urine 900 mL OP overnight. 12/18/22: Afebrile, hemodynamically stable. No acute events overnight. Patient stating she feels a little better this morning but still feels weak. States that she has not had any nausea or vomiting since yesterday. States she had 2 medium solid BMs and no longer has abdominal pain.   States she ate all her breakfast.  Creatinine at 7.3 today, no improvement. Ionized calcium still remains low, will continue to replete. Potassium magnesium still low, will continue to replete. Continue IV bicarb drip.     12/19/22: Afebrile, satting 97% on room air, hemodynamically stable. Patient states she is feeling better. Reports no more hemoptysis. States that she still having some nausea and I eating much of her meals. Denies vomiting and abdominal pains. No other complaints. Creatinine 7.1 today. Slow improvement. Continue to correct electrolyte derangements as noted above. Continue IV bicarb drip     12/20/22: Hemodynamically stable. No acute events. Creatinine downtrending, 6.6 today. H&H fluctuating but stable. Phosphorus coming down. Ionized calcium still low, will replace again today. Potassium and magnesium okay will withhold replacement today. Patient stated that she did have acute episode of nausea followed by vomiting earlier this morning after eating her breakfast.  Currently states that she does not feel nauseous and has no complaints at this time. Continue IV fluids. \"     12/21: n/v improving, creat up to 7.2 again -> IVF adjusted  12/19 per renal as CO2 improving but renal fxn not improving - holding ASA, plavix in prep for possible bx, HD catheter. 12/22 -> creat still 6.9 -> per renal temp HD catheter placed and started on HD  12/23 -> HD ran again per renal - creat down to 3.9   12/24 -> creat down to 3.0  12/25 -> creat up to 4.5 with last HD 12/23 12/26 -> creat 5.7 and HD run 12/26; c/o nausea with eating although better     12.27.2022 pt states she is doing ok this am - cont with nausea with eating but no assoicated abd pain or nausea but overall improving. Denies any cp, sob. On RA. Afebrile. Mouth sores improving. No lightheaded/dizziness. No accurate I/O.   Last BM 12/23 x 2.  apprec renal assist -- creat improves with HD -- d/w Dr. Kimmy Frazier and plan for tunneled HD catheter and likely renal bx  -> lytes, h/h stable. Await further renal plan. Cont with nausea with eating --> ? ?HIDA, ?need GI eval as ?if this lead to dehydration and ERUM. Cont monitor lytes, renal fxn, u/o, BP, h/h, plts, and cont to increase activity with PT/OT     12.28.2022 patient seen this am Cr 4.9, hgb stable, discussed during collaborative rounds getting renal biopsy today along with tunnel catheter HD, possibly discharge in a.m.     12.29.2022 patient seen this a.m. complains of UTI symptoms we will obtain a urine microscopy and urinalysis. Patient still would like to go home and be treated on outpatient basis if she does have a UTI. Discharge if okay with nephrology with follow-up    12.30.2022 patient seen this a.m. medically stable possible discharge after hemodialysis today if okay with nephrology  Exam:     Vitals:  Vitals:    12/29/22 2130 12/30/22 0015 12/30/22 0530 12/30/22 0756   BP: 123/63 (!) 124/52 135/65 107/64   Pulse: 95 95 89 95   Resp: 17 17 17 18   Temp: 98.6 °F (37 °C) 99 °F (37.2 °C) 98.9 °F (37.2 °C) 97.9 °F (36.6 °C)   TempSrc: Oral Oral Oral Oral   SpO2: 99% 99% 99% 98%   Weight:   119 lb 7.8 oz (54.2 kg)    Height:         Weight: Weight: 119 lb 7.8 oz (54.2 kg)     24 hour intake/output:  Intake/Output Summary (Last 24 hours) at 12/30/2022 1042  Last data filed at 12/29/2022 1854  Gross per 24 hour   Intake 480 ml   Output 80 ml   Net 400 ml         General appearance:  No apparent distress, appears stated age and cooperative. HEENT:  Normal cephalic, atraumatic without obvious deformity. Pupils equal, round, and reactive to light. Extra ocular muscles intact. Conjunctivae/corneas clear. Neck: Supple, with full range of motion. No jugular venous distention. Trachea midline. Respiratory:  Normal respiratory effort. Clear to auscultation, bilaterally without Rales/Wheezes/Rhonchi.   Cardiovascular:  Regular rate and rhythm with normal S1/S2 without murmurs, rubs or gallops. Abdomen: Soft, non-tender, non-distended with normal bowel sounds. Musculoskeletal:  No clubbing, cyanosis or edema bilaterally. Full range of motion without deformity. Skin: Skin color, texture, turgor normal.  No rashes or lesions. Neurologic:  Neurovascularly intact without any focal sensory/motor deficits. Cranial nerves: II-XII intact, grossly non-focal.  Psychiatric:  Alert and oriented, thought content appropriate, normal insight  Capillary Refill: Brisk,< 3 seconds   Peripheral Pulses: +2 palpable, equal bilaterally       Labs: For convenience and continuity at follow-up the following most recent labs are provided:      CBC:    Lab Results   Component Value Date/Time    WBC 10.3 12/29/2022 05:30 AM    HGB 8.5 12/29/2022 05:30 AM    HCT 26.8 12/29/2022 05:30 AM     12/29/2022 05:30 AM       Renal:    Lab Results   Component Value Date/Time     12/30/2022 05:39 AM    K 4.6 12/30/2022 05:39 AM     12/30/2022 05:39 AM    CO2 23 12/30/2022 05:39 AM    BUN 27 12/30/2022 05:39 AM    CREATININE 5.3 12/30/2022 05:39 AM    CALCIUM 7.2 12/30/2022 05:39 AM    PHOS 4.7 12/22/2022 05:47 AM         Significant Diagnostic Studies    Radiology:   CT BIOPSY RENAL   Final Result    Successful CT-guided random renal biopsy of the right kidney. **This report has been created using voice recognition software. It may contain minor errors which are inherent in voice recognition technology. **      Final report electronically signed by Dr. Giovanni Coker MD on 12/28/2022 10:27 AM      CT GUIDED NEEDLE PLACEMENT   Final Result    Successful CT-guided random renal biopsy of the right kidney. **This report has been created using voice recognition software. It may contain minor errors which are inherent in voice recognition technology. **      Final report electronically signed by Dr. Giovanni Coker MD on 12/28/2022 10:27 AM      IR FLUORO GUIDED NEEDLE PLACEMENT Final Result   Status post removal of the indwelling non-tunneled dialysis catheter and replacement with a new tunneled dialysis catheter, as outlined above. **This report has been created using voice recognition software. It may contain minor errors which are inherent in voice recognition technology. **      Final report electronically signed by Dr. Kate Garcia on 12/28/2022 9:04 AM      [address]   Final Result   Numerous small nonvascular echogenic foci adherent to the gallbladder wall thought to represent adherent sludge. **This report has been created using voice recognition software. It may contain minor errors which are inherent in voice recognition technology. **      Final report electronically signed by Dr Arnold Taveras on 12/26/2022 3:27 PM      IR FLUORO GUIDED CVA DEVICE PLMT/REPLACE/REMOVAL   Final Result   Status post successful nontunneled dialysis catheter insertion. **This report has been created using voice recognition software. It may contain minor errors which are inherent in voice recognition technology. **      Final report electronically signed by Dr Arnold Taveras on 12/22/2022 1:41 PM      US RENAL COMPLETE   Final Result   Impression:   Echogenic bilateral kidneys, this represents chronic kidney disease. This document has been electronically signed by: Lencho Jarrell MD on    12/20/2022 08:27 PM      CT CHEST WO CONTRAST   Final Result   Minimal atelectatic/fibrotic stranding left lung base and lingula. Small hiatus hernia. Distended gallbladder, likely related to not having eaten recently. **This report has been created using voice recognition software. It may contain minor errors which are inherent in voice recognition technology. **      Final report electronically signed by Dr. Kate Garcia on 12/17/2022 5:56 PM      XR CHEST (2 VW)   Final Result   1. No interval change since previous study dated 9/18/2021. Chioma Ryder **This report has been created using voice recognition software. It may contain minor errors which are inherent in voice recognition technology. **      Final report electronically signed by DR Dannie Barraza on 12/16/2022 11:36 AM             Consults:     IP CONSULT TO CASE MANAGEMENT  IP CONSULT TO SPIRITUAL SERVICES  IP CONSULT TO NEPHROLOGY  IP CONSULT TO NEPHROLOGY  IP CONSULT TO DIETITIAN  IP CONSULT TO PULMONOLOGY  IP CONSULT TO CASE MANAGEMENT  IP CONSULT TO HOME CARE NEEDS    Disposition:    [x] Home       [] TCU       [] Rehab       [] Psych       [] SNF       [] [place]       [] Other-    Condition at Discharge: Stable    Code Status:  Full Code     Patient Instructions:    Discharge lab work: Activity: activity as tolerated  Diet: ADULT DIET; Regular; Low Fat/Low Chol/High Fiber/2 gm Na; Less than 60 gm  ADULT ORAL NUTRITION SUPPLEMENT; Breakfast, Dinner; Standard 4 oz Oral Supplement      Follow-up visits:   [facility]  [address] , [address]  Follow up  Every Mon, Wed, and Friday at 3:10 pm. Please arrive 30 minutes early for your first appointment.     [address]  [phone]    Schedule an appointment as soon as possible for a visit      John Diego MD  [facility]  [phone]               Discharge Medications:        Medication List        START taking these medications      calcitRIOL 0.25 MCG capsule  Commonly known as: ROCALTROL  Take 1 capsule by mouth four times a week  Start taking on: December 31, 2022            CHANGE how you take these medications      cetirizine 5 MG tablet  Commonly known as: ZYRTEC  Take 1 tablet by mouth nightly as needed for Allergies  What changed:   medication strength  how much to take  when to take this  reasons to take this  Another medication with the same name was removed. Continue taking this medication, and follow the directions you see here.             CONTINUE taking these medications      amLODIPine 5 MG tablet  Commonly known as: NORVASC  TAKE 1 TABLET BY MOUTH DAILY     aspirin 81 MG tablet     buPROPion 200 MG extended release tablet  Commonly known as: WELLBUTRIN SR  Take 1 tablet by mouth 2 times daily     chlordiazePOXIDE-clidinium 5-2.5 MG per capsule  Commonly known as: LIBRAX  TAKE 1 CAPSULE TWICE A DAY AS NEEDED FOR PAIN     clopidogrel 75 MG tablet  Commonly known as: PLAVIX  TAKE 1 TABLET BY MOUTH DAILY     denosumab 60 MG/ML Soln SC injection  Commonly known as: PROLIA     docusate sodium 100 MG capsule  Commonly known as: COLACE     folic acid 742 MCG tablet  Commonly known as: FOLVITE     montelukast 10 MG tablet  Commonly known as: SINGULAIR  Take 1 tablet by mouth daily     simvastatin 20 MG tablet  Commonly known as: ZOCOR  TAKE 1 TABLET BY MOUTH DAILY     traZODone 50 MG tablet  Commonly known as: DESYREL  TAKE 1 TABLET NIGHTLY     triamcinolone 55 MCG/ACT nasal inhaler  Commonly known as: NASACORT            STOP taking these medications      b complex vitamins capsule     bisacodyl 5 MG EC tablet  Commonly known as: DULCOLAX     doxycycline hyclate 100 MG capsule  Commonly known as: VIBRAMYCIN     EYE DROPS ALLERGY RELIEF OP     FISH OIL     linaclotide 290 MCG Caps capsule  Commonly known as: LINZESS     MIRALAX PO     Probiotic-10 Chew     simethicone 80 MG chewable tablet  Commonly known as: MYLICON               Where to Get Your Medications        These medications were sent to [address], [address], [facility] [place] [address]      Phone: [phone]   calcitRIOL 0.25 MCG capsule  cetirizine 5 MG tablet         Time Spent on discharge is more than 45 minutes in the examination, evaluation, counseling and review of medications and discharge plan. Signed: Thank you Burton Jameson MD for the opportunity to be involved in this patient's care.     Electronically signed by Joe Peters DO on 12/30/2022 at 10:42 AM

## 2022-12-30 NOTE — PROGRESS NOTES
[facility]   Nephrology progress note  12/30/2022, 11:30 AM      Pt Name:    Ashley Ham  MRN:     [id number]     YOB: 1955  Admit Date:    12/16/2022 10:35 AM    Chief Complaint: Nephrology following for acute kidney injury/CKD    Subjective:  Patient seen and examined  No chest pain or shortness of breath  Patient says she is feeling much better today  And during dialysis    Objective:  24HR INTAKE/OUTPUT:    Intake/Output Summary (Last 24 hours) at 12/30/2022 1130  Last data filed at 12/29/2022 1854  Gross per 24 hour   Intake 240 ml   Output 80 ml   Net 160 ml        Admission weight: 114 lb (51.7 kg)  Wt Readings from Last 3 Encounters:   12/30/22 111 lb 8.8 oz (50.6 kg)   12/16/22 114 lb (51.7 kg)   12/12/22 116 lb (52.6 kg)        Vitals :   Vitals:    12/30/22 0015 12/30/22 0530 12/30/22 0756 12/30/22 1022   BP: (!) 124/52 135/65 107/64 (!) 149/85   Pulse: 95 89 95 88   Resp: 17 17 18 18   Temp: 99 °F (37.2 °C) 98.9 °F (37.2 °C) 97.9 °F (36.6 °C) 97.5 °F (36.4 °C)   TempSrc: Oral Oral Oral    SpO2: 99% 99% 98%    Weight:  119 lb 7.8 oz (54.2 kg)  111 lb 8.8 oz (50.6 kg)   Height:           Physical examination  General Appearance:  Well developed.  No distress  Mouth/Throat:  Oral mucosa moist  Neck:  Supple, no JVD  Lungs:  Breath sounds: clear  Heart[de-identified]  S1,S2 heard  Abdomen:  Soft, non - tender  Musculoskeletal:  Edema -no edema noted in all 4 extremities well      Medications:  Infusion:    sodium chloride Stopped (12/28/22 1006)    sodium chloride      [Held by provider] sodium chloride Stopped (12/23/22 1525)    sodium chloride       Meds:    epoetin mumtaz-epbx  2,000 Units SubCUTAneous Once per day on Mon Wed Fri    And    epoetin mumtaz-epbx  3,000 Units SubCUTAneous Once per day on Mon Wed Fri    [Held by provider] magnesium oxide  400 mg Oral BID    calcium replacement protocol   Other RX Placeholder    calcitRIOL  0.25 mcg Oral Once per day on Sun Tue Thu Sat docusate sodium  100 mg Oral Daily    senna  1 tablet Oral Nightly    [Held by provider] potassium bicarb-citric acid  40 mEq Oral Daily    amLODIPine  5 mg Oral Daily    [Held by provider] aspirin  81 mg Oral Daily    buPROPion  200 mg Oral BID    [Held by provider] clopidogrel  75 mg Oral Daily    folic acid  183 mcg Oral Daily    montelukast  10 mg Oral Daily    atorvastatin  10 mg Oral Daily    traZODone  50 mg Oral Nightly    polyethylene glycol  17 g Oral Daily    sodium chloride flush  10 mL IntraVENous 2 times per day    [Held by provider] heparin (porcine)  5,000 Units SubCUTAneous 3 times per day       Lab Data :  CBC:   Recent Labs     12/28/22  0547 12/29/22  0530   WBC 9.2 10.3   HGB 8.6* 8.5*   HCT 27.2* 26.8*    242       CMP:  Recent Labs     12/28/22  0547 12/29/22  0530 12/30/22  0539    141 141   K 4.2 4.8 4.6    105 105   CO2 25 26 23   BUN 26* 14 27*   CREATININE 4.9* 3.3* 5.3*   GLUCOSE 76 79 71   CALCIUM 8.2* 8.0* 7.2*       Hepatic:   No results for input(s): LABALBU, AST, ALT, ALB, BILITOT, ALKPHOS in the last 72 hours. Assessment and Plan:  Renal -acute kidney injury most likely secondary to severe volume depletion,  She looked extremely dry, received IV fluids without much improvement so dialysis has been initiated  Serologic work-up negative, except LUCINA. Lupus work-up is pending at this time  Status post tunneled dialysis catheter placement and had a renal biopsy as well. Follow-up on the renal biopsy  Seen during dialysis tolerating procedure well. Electrolytes -within normal limits  Acid-base status-metabolic acidosis improved with dialysis  Hypocalcemia corrected calcium reasonable   CKD stage IV with baseline creatinine 1.8-2.0  Hx of fibromyalgia  Essential hypertension stable  Meds reviewed and discussed with patient,     Dyan Arora MD  [facility]    This report has been created using voice recognition software.  It may contain minor errors which are inherent in voice recognition technology

## 2022-12-30 NOTE — CARE COORDINATION
12/30/22, 12:10 PM EST    Home with , new [address], and new OP HD at [facility] MWF 1250. Patient goals/plan/ treatment preferences discussed by  and . Patient goals/plan/ treatment preferences reviewed with patient/ family. Patient/ family verbalize understanding of discharge plan and are in agreement with goal/plan/treatment preferences. Understanding was demonstrated using the teach back method. AVS provided by RN at time of discharge, which includes all necessary medical information pertaining to the patients current course of illness, treatment, post-discharge goals of care, and treatment preferences.      Services At/After Discharge: Home Health, OP dialysis        IMM Letter  IMM Letter given to Patient/Family/Significant other/Guardian/POA/by[de-identified] Hemalatha Adjutarowena PARSONS  IMM Letter date given[de-identified] 12/29/22  IMM Letter time given[de-identified] 1010

## 2022-12-30 NOTE — PROGRESS NOTES
Speech Language Pathology  [address] [address]  SPEECH THERAPY MISSED TREATMENT NOTE  [facility]LE[facility] 6K      Date: 2022  Patient Name: Libia Barton        MRN: [id number]    : 1955  (69 y.o.)    REASON FOR MISSED TREATMENT:    Attempted to see patient for follow up ST services. FLY Samson reported that the patient is off the floor for dialysis. ST to re-attempt as able. Ashli Mahajan M.A.  CCC-SLP

## 2023-01-03 ENCOUNTER — TELEPHONE (OUTPATIENT)
Dept: FAMILY MEDICINE CLINIC | Age: 68
End: 2023-01-03

## 2023-01-03 LAB — MISC REFERENCE: NORMAL

## 2023-01-03 NOTE — TELEPHONE ENCOUNTER
Care Transitions Initial Follow Up Call    Outreach made within 2 business days of discharge: Yes    Patient: Ty Rojas Patient : 1955   MRN: [id number]  Reason for Admission: There are no discharge diagnoses documented for the most recent discharge.   Discharge Date: 22       Spoke with: LM    Discharge department/facility: [facility]        Scheduled appointment with PCP within 7-14 days    Follow Up  Future Appointments   Date Time Provider Jaya Chapman   1/3/2023  1:40 PM CARLOS MANUEL Ferrer - CNP SRPX [facility]   2023  1:20 PM MD HARLEEN Sanchez [facility]   2023  3:00 PM Yovana Murphy, PhD N [id number] [address] [facility]   2023 11:20 AM MD NAYANA PerezX [facility]   10/30/2023  1:15 PM MD HARLEEN Faith SRPX Heart [facility] Laura Spence[address]

## 2023-01-04 ENCOUNTER — TELEPHONE (OUTPATIENT)
Dept: FAMILY MEDICINE CLINIC | Age: 68
End: 2023-01-04

## 2023-01-04 DIAGNOSIS — R11.2 NAUSEA AND VOMITING, UNSPECIFIED VOMITING TYPE: Primary | ICD-10-CM

## 2023-01-04 RX ORDER — ONDANSETRON 4 MG/1
4 TABLET, ORALLY DISINTEGRATING ORAL 3 TIMES DAILY PRN
Qty: 21 TABLET | Refills: 0 | Status: SHIPPED | OUTPATIENT
Start: 2023-01-04

## 2023-01-04 NOTE — TELEPHONE ENCOUNTER
Care Transitions Initial Follow Up Call    Outreach made within 2 business days of discharge: Yes    Patient: Mikel Narayan Patient : 1955   MRN: [id number]  Reason for Admission: There are no discharge diagnoses documented for the most recent discharge.   Discharge Date: 22       Spoke with: KIRAN HURTADO 2    Discharge department/facility: [facility]        Scheduled appointment with PCP within 7-14 days    Follow Up  Future Appointments   Date Time Provider Jaya Chapman   2023  1:20 PM Arianne Isbell MD N [address] [place]   2023  3:00 PM Lj Daily, PhD N SRPXPsych[facility] [place]   2023 11:20 AM Narciso Medellin MD SRPX [facility] [place]   10/30/2023  1:15 PM Remy Piña MD N SRPX Heart [facility] [place] [facility], [address] [place] [address]

## 2023-01-04 NOTE — TELEPHONE ENCOUNTER
Pt called in - she was recently in the hospital for kidney failure and is not able to keep food down. Hospital \"gave her a tablet under the tongue\" I am assuming it was zofran. She is wanting to know if she is able to get a prescription of this medication sent into [facility] so that she is able to keep food and water down.

## 2023-01-05 ENCOUNTER — TELEPHONE (OUTPATIENT)
Dept: FAMILY MEDICINE CLINIC | Age: 68
End: 2023-01-05

## 2023-01-05 NOTE — TELEPHONE ENCOUNTER
Pt's  Pippa Patel called for an appt. He states pt was in hosp (12/16-12/30) and is still vomiting-cant keep anything down. Also needs hosp follow up and this will be new patient    Per Dr Connor Sweeney, due to not having any openings today, advises to call current PCP to see if she has openings today if not go to ER      aware and voiced understanding, no concerns voiced at this time.      Also scheduled new patient hosp follow up for 1/10/23

## 2023-01-09 NOTE — PROGRESS NOTES
[address]  [address]  Phone:  [phone]     Post-Discharge Transitional Care  Follow Up      Merlinda Popp   YOB: 1955    Date of Office Visit:  1/10/2023  Date of Hospital Admission: 12/16/22  Date of Hospital Discharge: 12/30/22  Risk of hospital readmission (high >=14%. Medium >=10%) :Readmission Risk Score: 17    Care management risk score Rising risk (score 2-5) and Complex Care (Scores >=6): No Risk Score On File     Non face to face  following discharge, date last encounter closed (first attempt may have been earlier): 01/03/2023    Call initiated 2 business days of discharge: Yes    Inpatient course: Discharge summary reviewed- see chart. Interval history/Current status:  See below. ASSESSMENT/PLAN:     Hospital discharge follow-up  [facility] records, labs, and imaging were reviewed as below. -     OR DISCHARGE MEDS RECONCILED W/ CURRENT OUTPATIENT MED LIST    ERUM (acute kidney injury) ([facility] [address]  -     She had ERUM on CKD which has progressed and she is now on dialysis 3x/week for 6 weeks. Continue to follow with Dr. Bambi Mohr as directed and avoid nephrotoxic medications. [facility]    Nausea and vomiting, unspecified vomiting type        -     Her symptoms are improving and she's been able to tolerate 3 meals/day. Continue with Zofran PRN. 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 Subjective: Jenn Aguilera is a 68 yo female who presents today to re-establish for hospital follow up. She was hospitalized at [facility] from 12/16/22-12/30/22. Hospital records, labs, and imaging was reviewed and is summarized below. She presented to the ER on 12/16 with c/o nausea and vomiting. She had seen her PCP on 12/12 and was started on Doxycycline for possible sinusitis that began 2 weeks prior. After 2 days of the Doxycycline she started with the nausea and vomiting and had poor PO intake. She was in ERUM on CKD IV for which she follows with Dr. Lisbet Hastings. Hospital Course (taken from discharge summary): Ginette Krishnamurthy is a 69 y.o. female admitted to [address] [place] [address] on 12/16/2022 for dehydration. ERUM on CKD stage IV: apprec renal assist- ?etiology but suspect due to severe volume depletion per renal  Creat trending down to 6.3 on 12/20 but up again to 7.2 on 12/21 and 6.9 on 12/22 --> overall down from 8.2 on 12/16 --> Baseline creatinine range ~ 1.8-2.0 in past year   Since not improving per renal 12/22 temp HD catheter and started HD 12/22 and another run 12/23  -> creat down to 3.0 on12/24 but going back up to  5.7 on 12/26 and s/p HD 12/26 and creat down to 3.3 on 12/29/2022   Plan for tunneled HD catheter per d/w Dr Lisbet Hastings   IVF held 12/23 per renal  ?? renal bx -->  cont hold ASA, plavix until next week per his rec for possible procedures (?tunneled tessio, ? renal bx)  IVF adjusted from bicarb gtt (12/16 - 12/19) and changed to NS 12/19 at 100 mL and creat cont worsen 12/21 --> HD started 12/22 per renal - IVF stopped 12/23  Renal u/s 12/20/22 with echogenic bilateral kidneys c/w chronic kidney disease  Serologic w/u 12/19 -> C3 little low, C4 normal , Antinuclear Ab Hep-2, IGG (-), myeloperoxidase (-), serine protease IgG (-), elevated kappa/lambda light chains, immunofixation normal, GBM Ab (-), ANCA (-), LUCINA (+)-> reflex (p)  Avoid nephrotoxic agents, renally dose medications - s/p bumex 2 mg po x 1 on 12/18  Strict I&O's  High AGMA, improving:   Likely due to ERUM/dehydration.     On bicarb drip 12/16 - 12/19 as improved to WNL 12/19 and changed to NS 12/20 per renal and IVF held since 12/23  Improving since HD also started 12/22  No signs of infxn -- Lactic acid 0.8 12/16 and 1.1 on 12/20  Nephrology following  Hemoptysis, resolved  Patient noted to have 3 episodes of hemoptysis with coughing. CT chest 12/17/22 with minimal atelectatic/fibrotic stranding left lung base and lingula. Sputum culture ordered but unable to obtain  Pulmonology consulted, appreciate recs: \"likely combination of recent URI dry humidity and use of plavix and ASA, Pulmonary service will sign off no follow-up needed \"   Hypokalemia   Replaced and improved - monitor with ERUM   Hypocalcemia  Daily calcium replacement prn  Persistently low - ?add tums- ?need PTH, vit D testing - will leave up to renal  Hypernatremia  Improved -- Secondary to #1, dehydration, nephrology following  Hypomagnesemia  Was on Po mag oxide starting 12/17 -> also given IV Mg 12/19 and then po Mg stopped 12/19 due to improved Mg  Cont monitor Mg prn  Hyperphosphatemia  Per renal no need for phosphorus binders at this time. D/t ERUM, should resolve as ERUM improves. Monitor prn as had improved to WNL on 12/22   Elevated troponin   0.033 on admission x 1 12/16 -> not repeated, EKG no acute ischemic changes. Patient denies chest pain - likely related to ERUM and electrolyte abn   Monitor for cardiac sx - no recent ischemic w/u and last echo 2/2022 with normal EF, no WMA  Abdominal pain, nausea, vomiting:   Patient reports N/V on admission - ?due to not being able to eat but more likely due to ERUM? ? Improving since 12/21  LFT 12/19/22 WNL  CT chest 12/17/22 with \"distended gallbladder likely related to not having eaten recently\" --> cont with nausea with eating   Check RUQ u/s 12/26 with numerous small non-vascular echogenic foci adjacent to GB wall thought to represent adherent sludge -> ??need HIDA scan  Stool softners, as needed miralax   As needed antiemetics zofran, phenergan IM prn   URI - sx improved  CXR on admission negative for pulmonary infiltrates or effusions. Patient has been on doxycycline started on 12/12.   No evidence of bacterial infection, thus no further antibiotics. Supportive care. Thrombocytopenia - resolved  Plts tended down from admission -- 131 on admission 12/17 -> down to 85 low on 12/21 and starting to trend up to normal since 12/25  ?etiology - ?plavix but Plts normal on admission and down - no signs of sepsis, ? DIC with renal failure but improving 12/23 - no other meds to contrib  Acute on chronic macrocytic anemia  Trending down during admission and Down to 6.7 on 12/21 am -> repeat 7.0  12/21 and down again 6.8 on 12/22 -> transfused 1 unit PRBC 12/22 as need for HD and hgb up to 8.4 on 12/25 and stable 8.8 on 12/29/2022    ? PPI/pepcid  down from 10.3 on 12/16 --> Baseline hemoglobin range ~10-11.    ?some slight loss with hemoptysis and per pulm felt due to epistaxis -> has resolved at least since 12/21  ASA, Plavix held starting 12/21 per renal for possible procedures needed   Suspect likely dilutional component from IVF compounded by poor kidney fxn. FOBT (-) 12/19  Iron studies notable for ferritin 402, iron 145, iron sat 90, TIBC less than 162, folate normal, vitamin B12 greater than 2000  Trend and monitor CBC. Retacrit started 12/21 per renal  hold heparin for DVT proph 12/22 and resume once hgb stable  Chronic HFpEF, compensated:   Echo (2/15/2022) notable for EF 55 to 60%, no WMA, G1 DD, mild tricuspid regurg. Continue ASA, statin, Plavix, amlodipine.     Daily weights, strict I&O's -- fluid mgmt per renal with ERUM and starting HD 12/22  Cardiac diet, 2G sodium  Essential hypertension, controlled:   Continue amlodipine 5 mg daily -> ?increase to 10 mg but monitor with HD started 12/22  Cont prn hydralazine  Monitor and adjust prn  (+) LUCINA   (+) on 12/19 -> reflex (p) - further rheum DS-DNA, SS, smooth AB labs sent  Hyperlipidemia:   Cont home Statin  Depression/anxiety:   Continue Wellbutrin, trazodone  ?on librax at home  History of CVA  No new sx  Continued on aspirin, Plavix, statin -> ASA, plavix held starting 12/21 for possible renal procedures and monitor neuro status  Mild MR, mild TR -- per echo 2/2022  Generalized weakness  PT/OT following - monitor progress    Since being home she's had some nausea. Last night she needed Zofran. This morning she ate Maxim[employer] Sara with low sodium and didn't feel nauseous. She went 9 days without a BM, but did go yesterday and today after a suppository. No chest pain or SOB. She's exhausted. She's ambulating with a walker. She's doing dialysis 3 days per week for 6 weeks. Home Health  Physician to Follow Referral: Jeny Serna MD    I certify that I, or a nurse practitioner or physician assistant working with me, had an in-person encounter with Nell Muñoz on 1/10/2023 and the reason for the home care services is documented in the clinical note for that day. Nell Muñoz was assessed on the above date and was found to have medical conditions requiring Home Care that included ERUM, CVA    Homebound Status: further, I certify that my clinical findings support that Nell Muñoz does meet the Medicare definition of \"Confined to the Home\" because of   Deconditioned due to medical condition  Unable to leave home without maximum effort and requires assistance of wheelchair or walker     Certification and medical necessity: I certify that, based on my findings, the following services are medically necessary home health services for Nell Muñoz for the following reasons:  - Vital signs monitoring: Nurse to perform BP, HR, RR, Temp, and Weight with each visit and teach patient and caregivers on taking daily. Nurse to call physician if pulse less than 50 or greater than 120, respiratory rate less than 12 or greater than 25, oral temperature greater than or equal to 288 oF, systolic BP less than 90 or greater than 415, diastolic BP less than 50 or greater than 100.   - Medication compliance and education for  new medications changes in previous medications safety concerns  - Consult Physical Therapy for  Evaluate and Treat  - Consult Occupational Therapy for  Evaluate and Treat  - [address] [place] for  assistance with Activities of Daily Living       Patient Active Problem List   Diagnosis    Cerebral infarction ([address]    Hx of Chest pain, atypical- tenderness on the left lower chest wall    Anxiety disorder    History of CVA (cerebrovascular accident)    Hyperlipidemia    Irritable bowel syndrome    Abdominal adhesions    Allergic rhinitis    Essential hypertension    Generalized anxiety disorder    Stage 4 chronic kidney disease (HCC)    Age related osteoporosis    Major depression, recurrent ([address]    Environmental and seasonal allergies    Hearing loss of right ear due to cerumen impaction    Recurrent sinusitis    Abnormal EKG    Postural dizziness    SOB (shortness of breath) on exertion    Lumbosacral radiculopathy    Degeneration of lumbar intervertebral disc    Acute kidney injury superimposed on chronic kidney disease ([facility])    Metabolic acidosis    Dehydration    Hypokalemia    Hypomagnesemia    Hyperphosphatemia    Hypocalcemia    Hemoptysis    ERUM (acute kidney injury) ([address]    Nosebleed    Acute on chronic anemia    Hypernatremia    Abdominal pain    Nausea and vomiting    Upper respiratory tract infection    Chronic diastolic CHF (congestive heart failure) ([facility])    Depression    Generalized weakness    ATN (acute tubular necrosis) ([address]    Thrombocytopenia ([address]       Medications listed as ordered at the time of discharge from hospital     Medication List            Accurate as of January 10, 2023  9:59 AM. If you have any questions, ask your nurse or doctor.                 CONTINUE taking these medications      amLODIPine 5 MG tablet  Commonly known as: NORVASC  Take 1 tablet by mouth daily     aspirin 81 MG tablet     buPROPion 200 MG extended release tablet  Commonly known as: WELLBUTRIN SR  Take 1 tablet by mouth 2 times daily calcitRIOL 0.25 MCG capsule  Commonly known as: ROCALTROL  Take 1 capsule by mouth four times a week     cetirizine 5 MG tablet  Commonly known as: ZYRTEC  Take 1 tablet by mouth nightly as needed for Allergies     chlordiazePOXIDE-clidinium 5-2.5 MG per capsule  Commonly known as: LIBRAX  TAKE 1 CAPSULE TWICE A DAY AS NEEDED FOR PAIN     clopidogrel 75 MG tablet  Commonly known as: PLAVIX  Take 1 tablet by mouth daily     denosumab 60 MG/ML Soln SC injection  Commonly known as: PROLIA     docusate sodium 100 MG capsule  Commonly known as: COLACE     folic acid 636 MCG tablet  Commonly known as: FOLVITE     montelukast 10 MG tablet  Commonly known as: SINGULAIR  Take 1 tablet by mouth daily     ondansetron 4 MG disintegrating tablet  Commonly known as: ZOFRAN-ODT  Take 1 tablet by mouth 3 times daily as needed for Nausea or Vomiting     simvastatin 20 MG tablet  Commonly known as: ZOCOR  TAKE 1 TABLET BY MOUTH DAILY     traZODone 50 MG tablet  Commonly known as: DESYREL  TAKE 1 TABLET NIGHTLY     triamcinolone 55 MCG/ACT nasal inhaler  Commonly known as: NASACORT               Where to Get Your Medications        These medications were sent to [address] [place] [address] [address]  [place], [place] - [address] - P [phone] - F [phone] [address] [place] [address]      Phone: [phone]   amLODIPine 5 MG tablet  clopidogrel 75 MG tablet         Medications marked \"taking\" at this time  Outpatient Medications Marked as Taking for the 1/10/23 encounter (Office Visit) with Ariel Perez MD   Medication Sig Dispense Refill    amLODIPine (NORVASC) 5 MG tablet Take 1 tablet by mouth daily 90 tablet 1    clopidogrel (PLAVIX) 75 MG tablet Take 1 tablet by mouth daily 90 tablet 1    ondansetron (ZOFRAN-ODT) 4 MG disintegrating tablet Take 1 tablet by mouth 3 times daily as needed for Nausea or Vomiting 21 tablet 0    calcitRIOL (ROCALTROL) 0.25 MCG capsule Take 1 capsule by mouth four times a week 30 capsule 3    cetirizine (ZYRTEC) 5 MG tablet Take 1 tablet by mouth nightly as needed for Allergies 30 tablet 0    traZODone (DESYREL) 50 MG tablet TAKE 1 TABLET NIGHTLY 90 tablet 3    docusate sodium (COLACE) 100 MG capsule Take 100 mg by mouth daily as needed for Constipation      montelukast (SINGULAIR) 10 MG tablet Take 1 tablet by mouth daily 30 tablet 5    buPROPion (WELLBUTRIN SR) 200 MG extended release tablet Take 1 tablet by mouth 2 times daily 180 tablet 3    simvastatin (ZOCOR) 20 MG tablet TAKE 1 TABLET BY MOUTH DAILY 90 tablet 3    chlordiazePOXIDE-clidinium (LIBRAX) 5-2.5 MG per capsule TAKE 1 CAPSULE TWICE A DAY AS NEEDED FOR PAIN 180 capsule 3    denosumab (PROLIA) 60 MG/ML SOLN SC injection Inject 60 mg into the skin once      folic acid (FOLVITE) 400 MCG tablet Take 400 mcg by mouth daily      aspirin 81 MG tablet Take 81 mg by mouth daily.            Medications patient taking as of now reconciled against medications ordered at time of hospital discharge: Yes    A comprehensive review of systems was negative except for what was noted in the HPI.    Objective:    /78 (Site: Right Upper Arm, Position: Sitting, Cuff Size: Large Adult)   Pulse (!) 116   Temp 97.7 °F (36.5 °C) (Temporal)   Resp 16   Wt 108 lb (49 kg)   SpO2 98%   BMI 21.09 kg/m²     General Appearance: alert and oriented to person, place and time, well developed and well- nourished, in no acute distress  Skin: warm and dry, no rash or erythema  Head: normocephalic and atraumatic  Eyes: extraocular eye movements intact, conjunctivae normal  ENT: tympanic membrane, external ear and ear canal normal bilaterally, nose without deformity, nasal mucosa and turbinates normal without polyps  Neck: supple and non-tender without mass, no thyromegaly or thyroid nodules, no cervical lymphadenopathy  Pulmonary/Chest: clear to auscultation bilaterally- no wheezes, rales or rhonchi, normal air movement, no respiratory distress  Cardiovascular: normal rate,  regular rhythm, normal S1 and S2, no murmurs, rubs, clicks, or gallops, distal pulses intact, no carotid bruits  Abdomen: soft, non-tender, non-distended, normal bowel sounds, no masses or organomegaly      An electronic signature was used to authenticate this note.   --Jeff Braswell MD 22-Oct-2022

## 2023-01-10 ENCOUNTER — OFFICE VISIT (OUTPATIENT)
Dept: FAMILY MEDICINE CLINIC | Age: 68
End: 2023-01-10

## 2023-01-10 VITALS
DIASTOLIC BLOOD PRESSURE: 78 MMHG | SYSTOLIC BLOOD PRESSURE: 118 MMHG | BODY MASS INDEX: 21.09 KG/M2 | HEART RATE: 116 BPM | TEMPERATURE: 97.7 F | OXYGEN SATURATION: 98 % | WEIGHT: 108 LBS | RESPIRATION RATE: 16 BRPM

## 2023-01-10 DIAGNOSIS — N17.9 AKI (ACUTE KIDNEY INJURY) (HCC): ICD-10-CM

## 2023-01-10 DIAGNOSIS — I63.00 CEREBRAL INFARCTION DUE TO THROMBOSIS OF PRECEREBRAL ARTERY (HCC): Chronic | ICD-10-CM

## 2023-01-10 DIAGNOSIS — R11.2 NAUSEA AND VOMITING, UNSPECIFIED VOMITING TYPE: ICD-10-CM

## 2023-01-10 DIAGNOSIS — Z09 HOSPITAL DISCHARGE FOLLOW-UP: Primary | ICD-10-CM

## 2023-01-10 RX ORDER — CLOPIDOGREL BISULFATE 75 MG/1
75 TABLET ORAL DAILY
Qty: 90 TABLET | Refills: 1 | Status: SHIPPED | OUTPATIENT
Start: 2023-01-10

## 2023-01-10 RX ORDER — AMLODIPINE BESYLATE 5 MG/1
5 TABLET ORAL DAILY
Qty: 90 TABLET | Refills: 1 | Status: SHIPPED | OUTPATIENT
Start: 2023-01-10

## 2023-01-10 RX ORDER — BENZONATATE 100 MG/1
100 CAPSULE ORAL 3 TIMES DAILY PRN
Qty: 30 CAPSULE | Refills: 0 | Status: SHIPPED | OUTPATIENT
Start: 2023-01-10 | End: 2023-01-17

## 2023-01-10 ASSESSMENT — PATIENT HEALTH QUESTIONNAIRE - PHQ9
4. FEELING TIRED OR HAVING LITTLE ENERGY: 0
2. FEELING DOWN, DEPRESSED OR HOPELESS: 0
6. FEELING BAD ABOUT YOURSELF - OR THAT YOU ARE A FAILURE OR HAVE LET YOURSELF OR YOUR FAMILY DOWN: 0
SUM OF ALL RESPONSES TO PHQ QUESTIONS 1-9: 0
9. THOUGHTS THAT YOU WOULD BE BETTER OFF DEAD, OR OF HURTING YOURSELF: 0
SUM OF ALL RESPONSES TO PHQ QUESTIONS 1-9: 0
SUM OF ALL RESPONSES TO PHQ9 QUESTIONS 1 & 2: 0
5. POOR APPETITE OR OVEREATING: 0
SUM OF ALL RESPONSES TO PHQ QUESTIONS 1-9: 0
7. TROUBLE CONCENTRATING ON THINGS, SUCH AS READING THE NEWSPAPER OR WATCHING TELEVISION: 0
3. TROUBLE FALLING OR STAYING ASLEEP: 0
SUM OF ALL RESPONSES TO PHQ QUESTIONS 1-9: 0
10. IF YOU CHECKED OFF ANY PROBLEMS, HOW DIFFICULT HAVE THESE PROBLEMS MADE IT FOR YOU TO DO YOUR WORK, TAKE CARE OF THINGS AT HOME, OR GET ALONG WITH OTHER PEOPLE: 0
1. LITTLE INTEREST OR PLEASURE IN DOING THINGS: 0
8. MOVING OR SPEAKING SO SLOWLY THAT OTHER PEOPLE COULD HAVE NOTICED. OR THE OPPOSITE, BEING SO FIGETY OR RESTLESS THAT YOU HAVE BEEN MOVING AROUND A LOT MORE THAN USUAL: 0

## 2023-01-16 PROBLEM — E86.0 DEHYDRATION: Status: RESOLVED | Noted: 2022-12-17 | Resolved: 2023-01-16

## 2023-01-23 ENCOUNTER — OFFICE VISIT (OUTPATIENT)
Dept: NEPHROLOGY | Age: 68
End: 2023-01-23

## 2023-01-23 DIAGNOSIS — N18.4 STAGE 4 CHRONIC KIDNEY DISEASE (HCC): Primary | ICD-10-CM

## 2023-01-23 PROCEDURE — 99999 PR OFFICE/OUTPT VISIT,PROCEDURE ONLY: CPT | Performed by: INTERNAL MEDICINE

## 2023-01-31 ENCOUNTER — TELEPHONE (OUTPATIENT)
Dept: FAMILY MEDICINE CLINIC | Age: 68
End: 2023-01-31

## 2023-01-31 NOTE — PROGRESS NOTES
[address]  [address] [place] [address]  Phone:  [phone]          Name: Sultana Joseph  : 1955    Chief Complaint   Patient presents with    Hypotension     Blood pressure dropping when standing. HPI:     Sultana Joseph is a 69 y.o. female who presents today for evaluation of orthostatic hypotension. While she's at PT, her BP will drop and she'll feel weak and dizzy. She is on Amlodipine 5 mg daily for hypertension. She thinks she's drinking enough but her  Shree Wade says she's only drinking maybe 4 glasses/day. She denies chest pain or palpitations. Home health is coming tomorrow.       Current Outpatient Medications:     clopidogrel (PLAVIX) 75 MG tablet, Take 1 tablet by mouth daily, Disp: 90 tablet, Rfl: 1    ondansetron (ZOFRAN-ODT) 4 MG disintegrating tablet, Take 1 tablet by mouth 3 times daily as needed for Nausea or Vomiting, Disp: 21 tablet, Rfl: 0    calcitRIOL (ROCALTROL) 0.25 MCG capsule, Take 1 capsule by mouth four times a week, Disp: 30 capsule, Rfl: 3    cetirizine (ZYRTEC) 5 MG tablet, Take 1 tablet by mouth nightly as needed for Allergies, Disp: 30 tablet, Rfl: 0    traZODone (DESYREL) 50 MG tablet, TAKE 1 TABLET NIGHTLY, Disp: 90 tablet, Rfl: 3    docusate sodium (COLACE) 100 MG capsule, Take 100 mg by mouth daily as needed for Constipation, Disp: , Rfl:     montelukast (SINGULAIR) 10 MG tablet, Take 1 tablet by mouth daily, Disp: 30 tablet, Rfl: 5    buPROPion (WELLBUTRIN SR) 200 MG extended release tablet, Take 1 tablet by mouth 2 times daily, Disp: 180 tablet, Rfl: 3    simvastatin (ZOCOR) 20 MG tablet, TAKE 1 TABLET BY MOUTH DAILY, Disp: 90 tablet, Rfl: 3    chlordiazePOXIDE-clidinium (LIBRAX) 5-2.5 MG per capsule, TAKE 1 CAPSULE TWICE A DAY AS NEEDED FOR PAIN, Disp: 180 capsule, Rfl: 3    triamcinolone (NASACORT) 55 MCG/ACT nasal inhaler, 2 sprays by Each Nostril route daily As needed, Disp: , Rfl:     folic acid (FOLVITE) 349 MCG tablet, Take 400 mcg by mouth daily, Disp: , Rfl:     aspirin 81 MG tablet, Take 81 mg by mouth daily. , Disp: , Rfl:     denosumab (PROLIA) 60 MG/ML SOLN SC injection, Inject 60 mg into the skin once (Patient not taking: Reported on 2/1/2023), Disp: , Rfl:     Allergies   Allergen Reactions    Pioglitazone Nausea And Vomiting     Other reaction(s): Unknown    Amoxicillin      Other reaction(s): Unknown    Amoxicillin-Pot Clavulanate Diarrhea     Other reaction(s): Unknown    Other Itching     Dog, cat, pet dander    Ciprofloxacin      unsure  Other reaction(s): Unknown    Sulfa Antibiotics Rash and Other (See Comments)     Other reaction(s): Unknown       Subjective:      Review of Systems   Cardiovascular:  Negative for chest pain and palpitations. Neurological:  Positive for dizziness, weakness and light-headedness. Objective:     /70 (Site: Right Upper Arm, Position: Sitting, Cuff Size: Small Adult)   Pulse 72   Temp 97.7 °F (36.5 °C) (Temporal)   Resp 16   Wt 101 lb (45.8 kg)   BMI 19.73 kg/m²     Physical Exam  Vitals and nursing note reviewed. Constitutional:       General: She is not in acute distress. Appearance: She is well-developed. HENT:      Head: Normocephalic and atraumatic. Right Ear: Tympanic membrane, ear canal and external ear normal.      Left Ear: Tympanic membrane, ear canal and external ear normal.      Nose: Nose normal.   Eyes:      Conjunctiva/sclera: Conjunctivae normal.   Cardiovascular:      Rate and Rhythm: Normal rate and regular rhythm. Heart sounds: Normal heart sounds. Pulmonary:      Effort: Pulmonary effort is normal. No respiratory distress. Breath sounds: Normal breath sounds. No wheezing. Abdominal:      General: Bowel sounds are normal. There is no distension. Palpations: Abdomen is soft. Tenderness: There is no abdominal tenderness. Musculoskeletal:      Cervical back: Normal range of motion and neck supple. Skin:     General: Skin is warm and dry. Neurological:      Mental Status: She is alert and oriented to person, place, and time. Psychiatric:         Behavior: Behavior normal.       Assessment/Plan: Rae Oliveira was seen today for hypotension. Diagnoses and all orders for this visit:    Orthostatic hypotension        -    Will hold the Amlodipine and have her call report of her BP in 1 week. Increased water intake encouraged. Return in about 1 week (around 2/8/2023) for call report of BP.     Electronically signed by Lionel Frank MD on 2/1/2023 at 10:02 AM

## 2023-01-31 NOTE — TELEPHONE ENCOUNTER
[facility] called stating that they and pt  concerned that pt is having issues with BP. Resting it /79 but as soon as pt stands it drops to 103/60. Pt becomes weak and cant stand. Pt  told PT that this happens at home and is like pt is blacking out. Pt has been having more recent falls and very weak. Pt  wanting to see dr Bueno Both.    Pt scheduled for 2/1

## 2023-02-01 ENCOUNTER — OFFICE VISIT (OUTPATIENT)
Dept: FAMILY MEDICINE CLINIC | Age: 68
End: 2023-02-01

## 2023-02-01 VITALS
BODY MASS INDEX: 19.73 KG/M2 | WEIGHT: 101 LBS | TEMPERATURE: 97.7 F | DIASTOLIC BLOOD PRESSURE: 70 MMHG | RESPIRATION RATE: 16 BRPM | HEART RATE: 72 BPM | SYSTOLIC BLOOD PRESSURE: 118 MMHG

## 2023-02-01 DIAGNOSIS — I95.1 ORTHOSTATIC HYPOTENSION: Primary | ICD-10-CM

## 2023-02-01 SDOH — ECONOMIC STABILITY: HOUSING INSECURITY
IN THE LAST 12 MONTHS, WAS THERE A TIME WHEN YOU DID NOT HAVE A STEADY PLACE TO SLEEP OR SLEPT IN A SHELTER (INCLUDING NOW)?: NO

## 2023-02-01 SDOH — ECONOMIC STABILITY: FOOD INSECURITY: WITHIN THE PAST 12 MONTHS, YOU WORRIED THAT YOUR FOOD WOULD RUN OUT BEFORE YOU GOT MONEY TO BUY MORE.: NEVER TRUE

## 2023-02-01 SDOH — ECONOMIC STABILITY: INCOME INSECURITY: HOW HARD IS IT FOR YOU TO PAY FOR THE VERY BASICS LIKE FOOD, HOUSING, MEDICAL CARE, AND HEATING?: NOT HARD AT ALL

## 2023-02-01 SDOH — ECONOMIC STABILITY: FOOD INSECURITY: WITHIN THE PAST 12 MONTHS, THE FOOD YOU BOUGHT JUST DIDN'T LAST AND YOU DIDN'T HAVE MONEY TO GET MORE.: NEVER TRUE

## 2023-02-02 ENCOUNTER — TELEPHONE (OUTPATIENT)
Dept: FAMILY MEDICINE CLINIC | Age: 68
End: 2023-02-02

## 2023-02-02 ENCOUNTER — HOSPITAL ENCOUNTER (OUTPATIENT)
Age: 68
Discharge: HOME | End: 2023-02-02
Payer: MEDICARE

## 2023-02-02 ENCOUNTER — HOSPITAL ENCOUNTER (OUTPATIENT)
Age: 68
End: 2023-02-02

## 2023-02-02 DIAGNOSIS — R10.84 GENERALIZED ABDOMINAL PAIN: Primary | ICD-10-CM

## 2023-02-02 LAB
BACTERIA URNS QL MICRO: ABNORMAL /HPF
BILIRUB UR QL STRIP.AUTO: NEGATIVE
CASTS #/AREA URNS LPF: ABNORMAL /LPF
CASTS 2: ABNORMAL /LPF
CHARACTER UR: CLEAR
COLOR: YELLOW
CRYSTALS URNS MICRO: ABNORMAL
EPITHELIAL CELLS, UA: ABNORMAL /HPF
GLUCOSE UR QL STRIP.AUTO: NEGATIVE MG/DL
HGB UR QL STRIP.AUTO: NEGATIVE
KETONES UR QL STRIP.AUTO: NEGATIVE
MISCELLANEOUS 2: ABNORMAL
NITRITE UR QL STRIP: NEGATIVE
PH UR STRIP.AUTO: 5.5 [PH] (ref 5–9)
PROT UR STRIP.AUTO-MCNC: ABNORMAL MG/DL
RBC URINE: ABNORMAL /HPF
RENAL EPI CELLS #/AREA URNS HPF: ABNORMAL /[HPF]
SP GR UR REFRACT.AUTO: 1.01 (ref 1–1.03)
UROBILINOGEN, URINE: 0.2 EU/DL (ref 0–1)
WBC #/AREA URNS HPF: ABNORMAL /HPF
WBC #/AREA URNS HPF: ABNORMAL /[HPF]
YEAST LIKE FUNGI URNS QL MICRO: ABNORMAL

## 2023-02-02 PROCEDURE — 81001 URINALYSIS AUTO W/SCOPE: CPT

## 2023-02-02 PROCEDURE — 87086 URINE CULTURE/COLONY COUNT: CPT

## 2023-02-02 NOTE — TELEPHONE ENCOUNTER
----- Message from Coffey Mikalchauncey sent at 2/2/2023  8:50 AM EST -----  Subject: Message to Provider    QUESTIONS  Information for Provider? Pt states that she has an kidney infection and   is experiencing symptoms of uncontrolled urination and abdominal pain for   2 days (she forgot to mention at her appt yesterday). Pt is requesting   medication for her symptoms. Please call pt back for guidance. ---------------------------------------------------------------------------  --------------  Damon Lua [facility]  [phone]; OK to leave message on voicemail  ---------------------------------------------------------------------------  --------------  SCRIPT ANSWERS  Relationship to Patient?  Self

## 2023-02-04 LAB
BACTERIA UR CULT: ABNORMAL
ORGANISM: ABNORMAL

## 2023-02-07 ENCOUNTER — TELEPHONE (OUTPATIENT)
Dept: FAMILY MEDICINE CLINIC | Age: 68
End: 2023-02-07

## 2023-02-07 NOTE — TELEPHONE ENCOUNTER
[facility] nurse [facility] called with orthostatic BP. Sitting:  BP: 114/70  Staning: BP:  80/54      Pulse rate remained at 100. Patient denied dizziness. Patient has not been taking amlodipine.

## 2023-02-07 NOTE — TELEPHONE ENCOUNTER
Let's continue off the Amlodipine and monitor symptoms. She may need Meclizine if the dizziness returns.

## 2023-02-16 ENCOUNTER — TELEPHONE (OUTPATIENT)
Dept: FAMILY MEDICINE CLINIC | Age: 68
End: 2023-02-16

## 2023-02-16 NOTE — TELEPHONE ENCOUNTER
[facility] home health called with pts BP's.    2/9: 122/68 and 117/70-both at rest    2/14: 136/86 and 132/65-both at rest    2/16: 156/88 at rest and 112/70 standing

## 2023-02-21 ENCOUNTER — TELEPHONE (OUTPATIENT)
Dept: FAMILY MEDICINE CLINIC | Age: 68
End: 2023-02-21

## 2023-02-21 ENCOUNTER — HOSPITAL ENCOUNTER (OUTPATIENT)
Age: 68
Discharge: HOME | End: 2023-02-21
Payer: MEDICARE

## 2023-02-21 DIAGNOSIS — R30.0 DYSURIA: Primary | ICD-10-CM

## 2023-02-21 LAB
BACTERIA URNS QL MICRO: ABNORMAL /HPF
BILIRUB UR QL STRIP.AUTO: NEGATIVE
CASTS #/AREA URNS LPF: ABNORMAL /LPF
CASTS 2: ABNORMAL /LPF
CHARACTER UR: CLEAR
COLOR: YELLOW
CRYSTALS URNS MICRO: ABNORMAL
EPITHELIAL CELLS, UA: ABNORMAL /HPF
GLUCOSE UR QL STRIP.AUTO: NEGATIVE MG/DL
HGB UR QL STRIP.AUTO: NEGATIVE
KETONES UR QL STRIP.AUTO: NEGATIVE
MISCELLANEOUS 2: ABNORMAL
NITRITE UR QL STRIP: NEGATIVE
PH UR STRIP.AUTO: 8 [PH] (ref 5–9)
PROT UR STRIP.AUTO-MCNC: 30 MG/DL
RBC URINE: ABNORMAL /HPF
RENAL EPI CELLS #/AREA URNS HPF: ABNORMAL /[HPF]
SP GR UR REFRACT.AUTO: 1.01 (ref 1–1.03)
UROBILINOGEN, URINE: 0.2 EU/DL (ref 0–1)
WBC #/AREA URNS HPF: ABNORMAL /HPF
WBC #/AREA URNS HPF: ABNORMAL /[HPF]
YEAST LIKE FUNGI URNS QL MICRO: ABNORMAL

## 2023-02-21 PROCEDURE — 81001 URINALYSIS AUTO W/SCOPE: CPT

## 2023-02-21 NOTE — TELEPHONE ENCOUNTER
Pt is having pain and burning with urination.  is asking if an order can be sent to [facility] to check urine. Pt is also on dialysis. Okay for UA reflux to culture? Order pending.

## 2023-02-22 RX ORDER — CEPHALEXIN 500 MG/1
500 CAPSULE ORAL 2 TIMES DAILY
Qty: 14 CAPSULE | Refills: 0 | Status: SHIPPED | OUTPATIENT
Start: 2023-02-22 | End: 2023-03-01

## 2023-02-25 DIAGNOSIS — R11.2 NAUSEA AND VOMITING, UNSPECIFIED VOMITING TYPE: ICD-10-CM

## 2023-02-27 RX ORDER — ONDANSETRON 4 MG/1
4 TABLET, ORALLY DISINTEGRATING ORAL 3 TIMES DAILY PRN
Qty: 21 TABLET | Refills: 0 | OUTPATIENT
Start: 2023-02-27

## 2023-02-28 DIAGNOSIS — R11.2 NAUSEA AND VOMITING, UNSPECIFIED VOMITING TYPE: ICD-10-CM

## 2023-02-28 RX ORDER — ONDANSETRON 4 MG/1
4 TABLET, ORALLY DISINTEGRATING ORAL 3 TIMES DAILY PRN
Qty: 21 TABLET | Refills: 0 | Status: SHIPPED | OUTPATIENT
Start: 2023-02-28

## 2023-03-02 ENCOUNTER — HOSPITAL ENCOUNTER (OUTPATIENT)
Age: 68
Setting detail: OBSERVATION
Discharge: HOME | End: 2023-03-04
Attending: EMERGENCY MEDICINE
Payer: MEDICARE

## 2023-03-02 ENCOUNTER — APPOINTMENT (OUTPATIENT)
Dept: GENERAL RADIOLOGY | Age: 68
End: 2023-03-02
Payer: MEDICARE

## 2023-03-02 DIAGNOSIS — E83.52 HYPERCALCEMIA: ICD-10-CM

## 2023-03-02 DIAGNOSIS — N17.9 ACUTE RENAL FAILURE, UNSPECIFIED ACUTE RENAL FAILURE TYPE (HCC): Primary | ICD-10-CM

## 2023-03-02 DIAGNOSIS — E16.2 HYPOGLYCEMIA: ICD-10-CM

## 2023-03-02 DIAGNOSIS — E83.51 HYPOCALCEMIA: ICD-10-CM

## 2023-03-02 PROBLEM — N17.0 ARF (ACUTE RENAL FAILURE) WITH TUBULAR NECROSIS (HCC): Status: ACTIVE | Noted: 2023-03-02

## 2023-03-02 LAB
ALBUMIN SERPL BCG-MCNC: 4.2 G/DL (ref 3.5–5.1)
ALP SERPL-CCNC: 90 U/L (ref 38–126)
ALT SERPL W/O P-5'-P-CCNC: 8 U/L (ref 11–66)
ANION GAP SERPL CALC-SCNC: 16 MEQ/L (ref 8–16)
AST SERPL-CCNC: 16 U/L (ref 5–40)
BACTERIA URNS QL MICRO: ABNORMAL /HPF
BASOPHILS ABSOLUTE: 0.1 THOU/MM3 (ref 0–0.1)
BASOPHILS NFR BLD AUTO: 1 %
BILIRUB CONJ SERPL-MCNC: < 0.2 MG/DL (ref 0–0.3)
BILIRUB SERPL-MCNC: 0.3 MG/DL (ref 0.3–1.2)
BILIRUB UR QL STRIP.AUTO: NEGATIVE
BUN SERPL-MCNC: 48 MG/DL (ref 7–22)
CA-I BLD ISE-SCNC: 1.7 MMOL/L (ref 1.12–1.32)
CALCIUM SERPL-MCNC: 14.2 MG/DL (ref 8.5–10.5)
CASTS #/AREA URNS LPF: ABNORMAL /LPF
CASTS 2: ABNORMAL /LPF
CHARACTER UR: CLEAR
CHLORIDE SERPL-SCNC: 101 MEQ/L (ref 98–111)
CO2 SERPL-SCNC: 24 MEQ/L (ref 23–33)
COLOR: YELLOW
CREAT SERPL-MCNC: 8.2 MG/DL (ref 0.4–1.2)
CRYSTALS URNS MICRO: ABNORMAL
DEPRECATED RDW RBC AUTO: 54.6 FL (ref 35–45)
EKG ATRIAL RATE: 82 BPM
EKG P AXIS: 78 DEGREES
EKG P-R INTERVAL: 168 MS
EKG Q-T INTERVAL: 372 MS
EKG QRS DURATION: 124 MS
EKG QTC CALCULATION (BAZETT): 434 MS
EKG R AXIS: 93 DEGREES
EKG T AXIS: 78 DEGREES
EKG VENTRICULAR RATE: 82 BPM
EOSINOPHIL NFR BLD AUTO: 0 %
EOSINOPHILS ABSOLUTE: 0 THOU/MM3 (ref 0–0.4)
EPITHELIAL CELLS, UA: ABNORMAL /HPF
ERYTHROCYTE [DISTWIDTH] IN BLOOD BY AUTOMATED COUNT: 14.7 % (ref 11.5–14.5)
FLUAV RNA RESP QL NAA+PROBE: NOT DETECTED
FLUBV RNA RESP QL NAA+PROBE: NOT DETECTED
GFR SERPL CREATININE-BSD FRML MDRD: 5 ML/MIN/1.73M2
GLUCOSE BLD STRIP.AUTO-MCNC: 120 MG/DL (ref 70–108)
GLUCOSE BLD STRIP.AUTO-MCNC: 164 MG/DL (ref 70–108)
GLUCOSE BLD STRIP.AUTO-MCNC: 171 MG/DL (ref 70–108)
GLUCOSE BLD STRIP.AUTO-MCNC: 45 MG/DL (ref 70–108)
GLUCOSE BLD STRIP.AUTO-MCNC: 94 MG/DL (ref 70–108)
GLUCOSE SERPL-MCNC: 46 MG/DL (ref 70–108)
GLUCOSE UR QL STRIP.AUTO: NEGATIVE MG/DL
HBV SURFACE AB SER QL IA: NEGATIVE
HBV SURFACE AG SERPL QL IA: NEGATIVE
HCT VFR BLD AUTO: 40.6 % (ref 37–47)
HGB BLD-MCNC: 12.2 GM/DL (ref 12–16)
HGB UR QL STRIP.AUTO: NEGATIVE
IMM GRANULOCYTES # BLD AUTO: 0.05 THOU/MM3 (ref 0–0.07)
IMM GRANULOCYTES NFR BLD AUTO: 0.9 %
KETONES UR QL STRIP.AUTO: ABNORMAL
LIPASE SERPL-CCNC: 20.1 U/L (ref 5.6–51.3)
LYMPHOCYTES ABSOLUTE: 0.8 THOU/MM3 (ref 1–4.8)
LYMPHOCYTES NFR BLD AUTO: 14.5 %
MAGNESIUM SERPL-MCNC: 2.5 MG/DL (ref 1.6–2.4)
MCH RBC QN AUTO: 30.8 PG (ref 26–33)
MCHC RBC AUTO-ENTMCNC: 30 GM/DL (ref 32.2–35.5)
MCV RBC AUTO: 102.5 FL (ref 81–99)
MISCELLANEOUS 2: ABNORMAL
MONOCYTES ABSOLUTE: 0.3 THOU/MM3 (ref 0.4–1.3)
MONOCYTES NFR BLD AUTO: 4.5 %
NEUTROPHILS NFR BLD AUTO: 79.1 %
NITRITE UR QL STRIP: NEGATIVE
NRBC BLD AUTO-RTO: 0 /100 WBC
NT-PROBNP SERPL IA-MCNC: 3563 PG/ML (ref 0–124)
OSMOLALITY SERPL CALC.SUM OF ELEC: 291 MOSMOL/KG (ref 275–300)
PH UR STRIP.AUTO: 5.5 [PH] (ref 5–9)
PLATELET # BLD AUTO: 253 THOU/MM3 (ref 130–400)
PMV BLD AUTO: 10 FL (ref 9.4–12.4)
POTASSIUM SERPL-SCNC: 5.2 MEQ/L (ref 3.5–5.2)
PROT SERPL-MCNC: 6.3 G/DL (ref 6.1–8)
PROT UR STRIP.AUTO-MCNC: 30 MG/DL
RBC # BLD AUTO: 3.96 MILL/MM3 (ref 4.2–5.4)
RBC URINE: ABNORMAL /HPF
RENAL EPI CELLS #/AREA URNS HPF: ABNORMAL /[HPF]
SARS-COV-2 RNA RESP QL NAA+PROBE: NOT DETECTED
SEGMENTED NEUTROPHILS ABSOLUTE COUNT: 4.6 THOU/MM3 (ref 1.8–7.7)
SODIUM SERPL-SCNC: 141 MEQ/L (ref 135–145)
SP GR UR REFRACT.AUTO: 1.01 (ref 1–1.03)
TROPONIN T: 0.02 NG/ML
TROPONIN T: 0.03 NG/ML
TSH SERPL DL<=0.005 MIU/L-ACNC: 2.36 UIU/ML (ref 0.4–4.2)
UROBILINOGEN, URINE: 0.2 EU/DL (ref 0–1)
WBC # BLD AUTO: 5.8 THOU/MM3 (ref 4.8–10.8)
WBC #/AREA URNS HPF: ABNORMAL /HPF
WBC #/AREA URNS HPF: NEGATIVE /[HPF]
YEAST LIKE FUNGI URNS QL MICRO: ABNORMAL

## 2023-03-02 PROCEDURE — 80053 COMPREHEN METABOLIC PANEL: CPT

## 2023-03-02 PROCEDURE — 85025 COMPLETE CBC W/AUTO DIFF WBC: CPT

## 2023-03-02 PROCEDURE — 84443 ASSAY THYROID STIM HORMONE: CPT

## 2023-03-02 PROCEDURE — 82948 REAGENT STRIP/BLOOD GLUCOSE: CPT

## 2023-03-02 PROCEDURE — 93005 ELECTROCARDIOGRAM TRACING: CPT | Performed by: STUDENT IN AN ORGANIZED HEALTH CARE EDUCATION/TRAINING PROGRAM

## 2023-03-02 PROCEDURE — G0257 UNSCHED DIALYSIS ESRD PT HOS: HCPCS

## 2023-03-02 PROCEDURE — 99223 1ST HOSP IP/OBS HIGH 75: CPT | Performed by: INTERNAL MEDICINE

## 2023-03-02 PROCEDURE — 99223 1ST HOSP IP/OBS HIGH 75: CPT | Performed by: NURSE PRACTITIONER

## 2023-03-02 PROCEDURE — 2500000003 HC RX 250 WO HCPCS: Performed by: STUDENT IN AN ORGANIZED HEALTH CARE EDUCATION/TRAINING PROGRAM

## 2023-03-02 PROCEDURE — 2580000003 HC RX 258: Performed by: INTERNAL MEDICINE

## 2023-03-02 PROCEDURE — 83880 ASSAY OF NATRIURETIC PEPTIDE: CPT

## 2023-03-02 PROCEDURE — G0378 HOSPITAL OBSERVATION PER HR: HCPCS

## 2023-03-02 PROCEDURE — 83735 ASSAY OF MAGNESIUM: CPT

## 2023-03-02 PROCEDURE — 83690 ASSAY OF LIPASE: CPT

## 2023-03-02 PROCEDURE — 82248 BILIRUBIN DIRECT: CPT

## 2023-03-02 PROCEDURE — 83605 ASSAY OF LACTIC ACID: CPT

## 2023-03-02 PROCEDURE — 86706 HEP B SURFACE ANTIBODY: CPT

## 2023-03-02 PROCEDURE — 87040 BLOOD CULTURE FOR BACTERIA: CPT

## 2023-03-02 PROCEDURE — 71046 X-RAY EXAM CHEST 2 VIEWS: CPT

## 2023-03-02 PROCEDURE — 93010 ELECTROCARDIOGRAM REPORT: CPT | Performed by: INTERNAL MEDICINE

## 2023-03-02 PROCEDURE — 82330 ASSAY OF CALCIUM: CPT

## 2023-03-02 PROCEDURE — 96372 THER/PROPH/DIAG INJ SC/IM: CPT

## 2023-03-02 PROCEDURE — 87636 SARSCOV2 & INF A&B AMP PRB: CPT

## 2023-03-02 PROCEDURE — 81001 URINALYSIS AUTO W/SCOPE: CPT

## 2023-03-02 PROCEDURE — 99285 EMERGENCY DEPT VISIT HI MDM: CPT

## 2023-03-02 PROCEDURE — 90935 HEMODIALYSIS ONE EVALUATION: CPT | Performed by: INTERNAL MEDICINE

## 2023-03-02 PROCEDURE — 87340 HEPATITIS B SURFACE AG IA: CPT

## 2023-03-02 PROCEDURE — 2580000003 HC RX 258: Performed by: NURSE PRACTITIONER

## 2023-03-02 PROCEDURE — 36415 COLL VENOUS BLD VENIPUNCTURE: CPT

## 2023-03-02 PROCEDURE — 84484 ASSAY OF TROPONIN QUANT: CPT

## 2023-03-02 RX ORDER — DEXTROSE MONOHYDRATE 100 MG/ML
INJECTION, SOLUTION INTRAVENOUS CONTINUOUS PRN
Status: DISCONTINUED | OUTPATIENT
Start: 2023-03-02 | End: 2023-03-04 | Stop reason: HOSPADM

## 2023-03-02 RX ORDER — ONDANSETRON 4 MG/1
4 TABLET, ORALLY DISINTEGRATING ORAL EVERY 8 HOURS PRN
Status: DISCONTINUED | OUTPATIENT
Start: 2023-03-02 | End: 2023-03-04 | Stop reason: HOSPADM

## 2023-03-02 RX ORDER — ONDANSETRON 2 MG/ML
4 INJECTION INTRAMUSCULAR; INTRAVENOUS EVERY 6 HOURS PRN
Status: DISCONTINUED | OUTPATIENT
Start: 2023-03-02 | End: 2023-03-04 | Stop reason: HOSPADM

## 2023-03-02 RX ORDER — BUPROPION HYDROCHLORIDE 100 MG/1
200 TABLET, EXTENDED RELEASE ORAL 2 TIMES DAILY
Status: DISCONTINUED | OUTPATIENT
Start: 2023-03-02 | End: 2023-03-03

## 2023-03-02 RX ORDER — POLYETHYLENE GLYCOL 3350 17 G/17G
17 POWDER, FOR SOLUTION ORAL DAILY PRN
Status: DISCONTINUED | OUTPATIENT
Start: 2023-03-02 | End: 2023-03-04 | Stop reason: HOSPADM

## 2023-03-02 RX ORDER — CALCITRIOL 0.25 UG/1
0.25 CAPSULE, LIQUID FILLED ORAL
Status: DISCONTINUED | OUTPATIENT
Start: 2023-03-02 | End: 2023-03-03

## 2023-03-02 RX ORDER — DOCUSATE SODIUM 100 MG/1
100 CAPSULE, LIQUID FILLED ORAL DAILY PRN
Status: DISCONTINUED | OUTPATIENT
Start: 2023-03-02 | End: 2023-03-04 | Stop reason: HOSPADM

## 2023-03-02 RX ORDER — ASPIRIN 81 MG/1
81 TABLET ORAL DAILY
Status: DISCONTINUED | OUTPATIENT
Start: 2023-03-03 | End: 2023-03-04 | Stop reason: HOSPADM

## 2023-03-02 RX ORDER — TRAZODONE HYDROCHLORIDE 50 MG/1
50 TABLET ORAL NIGHTLY PRN
Status: DISCONTINUED | OUTPATIENT
Start: 2023-03-02 | End: 2023-03-04 | Stop reason: HOSPADM

## 2023-03-02 RX ORDER — SODIUM CHLORIDE 9 MG/ML
INJECTION, SOLUTION INTRAVENOUS CONTINUOUS
Status: DISCONTINUED | OUTPATIENT
Start: 2023-03-02 | End: 2023-03-04

## 2023-03-02 RX ORDER — ATORVASTATIN CALCIUM 10 MG/1
10 TABLET, FILM COATED ORAL DAILY
Status: DISCONTINUED | OUTPATIENT
Start: 2023-03-03 | End: 2023-03-04 | Stop reason: HOSPADM

## 2023-03-02 RX ORDER — MONTELUKAST SODIUM 10 MG/1
10 TABLET ORAL DAILY
Status: DISCONTINUED | OUTPATIENT
Start: 2023-03-03 | End: 2023-03-04 | Stop reason: HOSPADM

## 2023-03-02 RX ORDER — FOLIC ACID 1 MG/1
500 TABLET ORAL DAILY
Status: DISCONTINUED | OUTPATIENT
Start: 2023-03-03 | End: 2023-03-04 | Stop reason: HOSPADM

## 2023-03-02 RX ORDER — ACETAMINOPHEN 325 MG/1
650 TABLET ORAL EVERY 6 HOURS PRN
Status: DISCONTINUED | OUTPATIENT
Start: 2023-03-02 | End: 2023-03-04 | Stop reason: HOSPADM

## 2023-03-02 RX ORDER — SODIUM CHLORIDE 9 MG/ML
INJECTION, SOLUTION INTRAVENOUS PRN
Status: DISCONTINUED | OUTPATIENT
Start: 2023-03-02 | End: 2023-03-04 | Stop reason: HOSPADM

## 2023-03-02 RX ORDER — HEPARIN SODIUM 5000 [USP'U]/ML
5000 INJECTION, SOLUTION INTRAVENOUS; SUBCUTANEOUS 2 TIMES DAILY
Status: DISCONTINUED | OUTPATIENT
Start: 2023-03-03 | End: 2023-03-04 | Stop reason: HOSPADM

## 2023-03-02 RX ORDER — SODIUM CHLORIDE 0.9 % (FLUSH) 0.9 %
5-40 SYRINGE (ML) INJECTION EVERY 12 HOURS SCHEDULED
Status: DISCONTINUED | OUTPATIENT
Start: 2023-03-02 | End: 2023-03-04 | Stop reason: HOSPADM

## 2023-03-02 RX ORDER — CLOPIDOGREL BISULFATE 75 MG/1
75 TABLET ORAL DAILY
Status: DISCONTINUED | OUTPATIENT
Start: 2023-03-03 | End: 2023-03-04 | Stop reason: HOSPADM

## 2023-03-02 RX ORDER — ACETAMINOPHEN 650 MG/1
650 SUPPOSITORY RECTAL EVERY 6 HOURS PRN
Status: DISCONTINUED | OUTPATIENT
Start: 2023-03-02 | End: 2023-03-04 | Stop reason: HOSPADM

## 2023-03-02 RX ORDER — SODIUM CHLORIDE 0.9 % (FLUSH) 0.9 %
5-40 SYRINGE (ML) INJECTION PRN
Status: DISCONTINUED | OUTPATIENT
Start: 2023-03-02 | End: 2023-03-04 | Stop reason: HOSPADM

## 2023-03-02 RX ADMIN — GLUCAGON HYDROCHLORIDE 1 MG: 1 INJECTION, POWDER, FOR SOLUTION INTRAMUSCULAR; INTRAVENOUS; SUBCUTANEOUS at 16:15

## 2023-03-02 RX ADMIN — SODIUM CHLORIDE, PRESERVATIVE FREE 10 ML: 5 INJECTION INTRAVENOUS at 22:38

## 2023-03-02 RX ADMIN — SODIUM CHLORIDE: 9 INJECTION, SOLUTION INTRAVENOUS at 22:37

## 2023-03-02 ASSESSMENT — PAIN DESCRIPTION - LOCATION
LOCATION: HEAD
LOCATION: HEAD

## 2023-03-02 ASSESSMENT — ENCOUNTER SYMPTOMS
DIARRHEA: 0
BACK PAIN: 0
ABDOMINAL PAIN: 0
VOMITING: 1
NAUSEA: 1

## 2023-03-02 ASSESSMENT — PAIN - FUNCTIONAL ASSESSMENT
PAIN_FUNCTIONAL_ASSESSMENT: 0-10
PAIN_FUNCTIONAL_ASSESSMENT: 0-10

## 2023-03-02 NOTE — ED NOTES
Patient resting in bed. Respirations easy and unlabored. No distress noted. Call light within reach.        Dina Alonzo RN  03/02/23 [id number]

## 2023-03-02 NOTE — ED NOTES
ED to inpatient nurses report    Chief Complaint   Patient presents with    Fatigue      Present to ED from home  LOC: alert and orientated to name and place  Vital signs   Vitals:    03/02/23 1306 03/02/23 1449 03/02/23 1511 03/02/23 1646   BP: (!) 174/83 (!) 172/81 (!) 174/84 (!) 156/80   Pulse: 86 85 84 91   Resp: 16 20 18 28   Temp: 97.6 °F (36.4 °C)      SpO2: 96% 100% 96%    Weight: 105 lb (47.6 kg)      Height: 5' (1.524 m)         Oxygen Baseline     Current needs required RA   LDAs:    Mobility: Requires assistance * 1  Pending ED orders: none  Present condition: stable      C-SSRS Risk of Suicide: No Risk  Swallow Screening    Preferred Language: [place]     Electronically signed by Kalyn Villatoro, RN on 3/2/2023 at 5:27 PM       Kalyn Villatoro RN  03/02/23 8626

## 2023-03-02 NOTE — ED TRIAGE NOTES
Patient presents into the E. D. with fatigue, nausea, and no appetite for around 2 weeks. She is a dialysis patient (Monday and Friday) and did not receive dialysis on Monday because she was feeling nauseated. Patient also did not take medications this morning due to nausea. She states that she is weak that she feels like she would fall. She had a family doctors appointment today in [place], but they wanted to stay closer to be seen. During last Fridays dialysis treatment, the patient's  states that her blood pressure was in the 190's and that they said when she changes positions it drops 40 points so they took her off of blood pressure medications. Also,  states that she has been having hallucinations and all she wants to do is sleep.

## 2023-03-02 NOTE — ED NOTES
Labs at bedside      Paco Pruitt [facility]  03/02/23 1456

## 2023-03-02 NOTE — H&P
Hospitalist History & Physical    Patient: Ann Marie Lawrence    Unit/Bed:29/029A  YOB: 1955  MRN: [id number]   Acct: [de-identified]   PCP: Yary Hudson MD  Code Status: Prior    Date of Service: Pt seen/examined on 03/02/23 and admitted to Observation with expected LOS less than two midnights due to medical therapy. Chief Complaint: fatigue, nausea, generalized weakness    Assessment/Plan:    Generalized weakness, nausea, fatigue: suspect due hypercalcemia and uremia. ED provider d/w nephrology, plan HD and does not recommend treating hypercalcium at this time. PT/OT    Uncontrolled HTN: h/o orthostatic hypotension with recent cessation of amlodipine. Orthostatics done in ED not remarkable. Re-evaluate following HD, may need resumed. Hypercalcemia: no treatment at this time per nephrology    Elevated troponin: EKG without ischemic changes but does show a new RBBB-per chart review she has h/o IRBBB, no c/o chest pain. Hypoglycemia: suspect due to nausea/poor appetite. Improved with glucagon in ED. Will continue accuchecks q 2 hours. ESRD on HD Monday and Friday: missed 2/24 and 2/27 session    HFpEF with elevated BNP: CXR without overt pulmonary edema, likely due to renal failure. Will likely receive UF with HD    Anemia of chronic disease    Orthostatic hypotension    HLP    CVA    AMISH    Cachexia Body mass index is 20.51 kg/m². Renal diet, nutrition consultation    History of Present Illness: Ann Marie Lawrence is a 69 y.o. female with PMHx of ESRD on HD, HTN, orthostatic hypotension, HLP, CVA, AMISH who presented to [facility] with chief complaint of nausea, fatigue, generalized weakness. Patient is alert, oriented x 3 but is a poor historian. Appears frail, weak, tremulous. Has multiple blankets. States she has been experiencing nausea x 2 weeks. Few episodes of non bloody emesis. States appetite very poor and only taking medications \"once in a while\".  Adilson Erazo states she has been feeling very weak and tired. Patient is on HD Monday and Fridays. States she missed last two sessions due to nausea. Denies fever, chest pain, shortness of breath, abdominal pain. Cannot recall last bowel movement. On initial presentation to ED, patient afebrile, HR 80-90, hypertension 174/83, O2 sat adequate on room air. She was hypoglycemic. Chemistries significant for BUN 48, sCr 8.2, CA++ 14.2, iCa++ 1.70, magnesium 2.5. BNP elevated, troponin elevated. CBC without leukocytosis or anemia. UA without suggestion of infection. CXR non acute. EKG: no acute ischemic changes. Patient received glucagon and BS improving. ED provider contacted nephrology, plan HD. No need to treat hypercalcemia at this time. Hospitalization for further medical management. Review of Systems: Pertinent positives as noted in the HPI. All other systems reviewed and negative. Past Medical History:        Diagnosis Date    Allergic rhinitis     Anxiety     CVA (cerebral infarction)     Depression     Fibromyalgia     Headache(784.0)     Hyperlipidemia     Hypertension     Irritable bowel syndrome     Kidney failure     Unspecified cerebral artery occlusion with cerebral infarction     Unspecified sleep apnea        Past Surgical History:        Procedure Laterality Date    CARPAL TUNNEL RELEASE Right     COLONOSCOPY  2012    [facility]    CT BIOPSY RENAL  12/28/2022    CT BIOPSY RENAL 12/28/2022 [facility] CT SCAN    ENDOSCOPY, COLON, DIAGNOSTIC  unsure    EYE SURGERY      lasik    HEMORRHOID SURGERY  unsure    HYSTERECTOMY (CERVIX STATUS UNKNOWN)      age 34    NECK SURGERY  18  years ago    fusion    OVARY REMOVAL Bilateral     age 34    SINUS SURGERY  10 years ago    TUBAL LIGATION         Home Medications:   No current facility-administered medications on file prior to encounter.      Current Outpatient Medications on File Prior to Encounter   Medication Sig Dispense Refill    ondansetron (ZOFRAN-ODT) 4 MG disintegrating tablet Take 1 tablet by mouth 3 times daily as needed for Nausea or Vomiting 21 tablet 0    clopidogrel (PLAVIX) 75 MG tablet Take 1 tablet by mouth daily 90 tablet 1    calcitRIOL (ROCALTROL) 0.25 MCG capsule Take 1 capsule by mouth four times a week 30 capsule 3    cetirizine (ZYRTEC) 5 MG tablet Take 1 tablet by mouth nightly as needed for Allergies 30 tablet 0    traZODone (DESYREL) 50 MG tablet TAKE 1 TABLET NIGHTLY 90 tablet 3    docusate sodium (COLACE) 100 MG capsule Take 100 mg by mouth daily as needed for Constipation      montelukast (SINGULAIR) 10 MG tablet Take 1 tablet by mouth daily 30 tablet 5    buPROPion (WELLBUTRIN SR) 200 MG extended release tablet Take 1 tablet by mouth 2 times daily 180 tablet 3    simvastatin (ZOCOR) 20 MG tablet TAKE 1 TABLET BY MOUTH DAILY 90 tablet 3    chlordiazePOXIDE-clidinium (LIBRAX) 5-2.5 MG per capsule TAKE 1 CAPSULE TWICE A DAY AS NEEDED FOR PAIN 180 capsule 3    triamcinolone (NASACORT) 55 MCG/ACT nasal inhaler 2 sprays by Each Nostril route daily As needed      folic acid (FOLVITE) 298 MCG tablet Take 400 mcg by mouth daily      aspirin 81 MG tablet Take 81 mg by mouth daily. Allergies:    Pioglitazone, Amoxicillin, Amoxicillin-pot clavulanate, Other, Ciprofloxacin, and Sulfa antibiotics    Social History:    reports that she has never smoked. She has never used smokeless tobacco. She reports that she does not drink alcohol and does not use drugs.     Family History:       Problem Relation Age of Onset    Diabetes Mother     High Blood Pressure Mother     Heart Disease Mother     Heart Disease Father     Parkinsonism Father     Neurofibromatosis Father     Depression Father     Alcohol Abuse Father     Neurofibromatosis Sister     Neurofibromatosis Brother     Other Brother         multiple sclerosis    Dementia Brother     Diabetes Sister     High Blood Pressure Sister     Depression Sister     Diabetes Brother        Diet:  No diet orders on file      Physical Exam:  BP (!) 156/80   Pulse 91   Temp 97.6 °F (36.4 °C)   Resp 28   Ht 5' (1.524 m)   Wt 105 lb (47.6 kg)   SpO2 96%   BMI 20.51 kg/m²   General appearance: No apparent distress, appears frail, cachexic, older than stated age. Tremulous  Eyes:  anicteric sclera  HENT: Head normal in appearance. External nares normal.  Oral mucosa dry. Hearing grossly intact. Neck: Supple. R upper chest SC HD catheter site without erythema or drainage  Respiratory:  Normal respiratory effort. Diminished bases bilaterally without rales or wheezes or rhonchi. Cardiovascular: Normal rate, regular rhythm with normal S1/S2 without murmurs. No lower extremity edema. Abdomen: Soft, non-tender, non-distended with normal bowel sounds. Musculoskeletal: Normal tone. Skin: Warm and dry. Neurologic:  No focal sensory/motor deficits in the upper and lower extremities. Cranial nerves: grossly non-focal 2-12. Psychiatric: Alert and oriented, flat affect. Data: (All radiographs, tracings, PFTs, and imaging are personally viewed and interpreted unless otherwise noted)  Labs:   Recent Labs     03/02/23  1614   WBC 5.8   HGB 12.2   HCT 40.6        Recent Labs     03/02/23  1455      K 5.2      CO2 24   BUN 48*   CREATININE 8.2*   CALCIUM 14.2*     Recent Labs     03/02/23  1455   AST 16   ALT 8*   BILIDIR <0.2   BILITOT 0.3   ALKPHOS 90     No results for input(s): INR in the last 72 hours. No results for input(s): [place] in the last 72 hours.   Urinalysis:   Lab Results   Component Value Date/Time    NITRU NEGATIVE 03/02/2023 02:50 PM    WBCUA 2-4 03/02/2023 02:50 PM    BACTERIA NONE SEEN 03/02/2023 02:50 PM    RBCUA 0-2 03/02/2023 02:50 PM    BLOODU NEGATIVE 03/02/2023 02:50 PM    SPECGRAV 1.009 12/29/2022 03:30 PM    GLUCOSEU NEGATIVE 03/02/2023 02:50 PM       EKG:   Normal sinus rhythm  Right bundle branch block  Abnormal ECG  When compared with ECG of 22-DEC-2022 05:06,  Right bundle branch block is now Present    Radiology:  XR CHEST (2 VW)   Final Result   There is no acute intrathoracic process. **This report has been created using voice recognition software. It may contain minor errors which are inherent in voice recognition technology. **      Final report electronically signed by Dr Andrew Quinones on 3/2/2023 2:11 PM        XR CHEST (2 VW)    Result Date: 3/2/2023  PROCEDURE: XR CHEST (2 VW) CLINICAL INFORMATION: 40-year-old female with fatigue. Weakness. COMPARISON: Radiographs 12/16/2022. TECHNIQUE: PA and lateral views of the chest were obtained. FINDINGS: Surgical hardware is present in the right humerus. There is fusion hardware in the cervical spine. A hemodialysis catheter is present with right IJ access. The lungs are clear. The cardiac silhouette and pulmonary vasculature are within normal limits. There is no significant pleural effusion or pneumothorax. Visualized portions of the upper abdomen are within normal limits. The osseous structures are intact. No acute fractures or suspicious osseous lesions. There is no acute intrathoracic process. **This report has been created using voice recognition software. It may contain minor errors which are inherent in voice recognition technology. ** Final report electronically signed by Dr Andrew Quinones on 3/2/2023 2:11 PM        Tele:   [] yes             [] no      Thank you Yary Hudson MD for the opportunity to be involved in this patient's care.     Electronically signed by CARLOS MANUEL Christiansen CNP on 3/2/2023 at 5:14 PM

## 2023-03-02 NOTE — PROGRESS NOTES
Pharmacy Medication History Note      List of current medications patient is taking is complete. Source of information: Patient's     Changes made to medication list:  Medications removed (include reason, ex. therapy complete or physician discontinued):  Prolia 60mg/mL injection - DC per MD    Medications added/doses adjusted:  None    Other notes (ex. Recent course of antibiotics, Coumadin dosing):  Denies use of other OTC or herbal medications.       Allergies reviewed      Electronically signed by Emiliana Cisneros on 3/2/2023 at 4:16 PM

## 2023-03-02 NOTE — ED NOTES
Per Dr Shruthi Garcia could use HD port for access if needed      Luis Felipe Sands, RN  03/02/23 6982
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
## 2023-03-02 NOTE — ED NOTES
Patient up to bathroom via wheelchair, and back to bed without any urine. Patient straight cathed and UA sent to lab. VSS and no distress noted. Call light within reach and no further requests at this time.      Crys Ferrer  03/02/23 3652

## 2023-03-02 NOTE — ED NOTES
Patient resting in bed. Respirations easy and unlabored. No distress noted. Call light within reach.   Pt medicated per mar will recheck POCT glucose in 15 mins     [facility]  03/02/23 5162

## 2023-03-02 NOTE — ED NOTES
Spoke to dialysis nurse to give report, central transport put in for pt to go to dialysis then to assigned bed [facility]  [facility]  03/02/[facility] 8212

## 2023-03-03 PROBLEM — E44.0 MODERATE MALNUTRITION (HCC): Status: ACTIVE | Noted: 2023-03-03

## 2023-03-03 LAB
ALBUMIN SERPL BCG-MCNC: 3.7 G/DL (ref 3.5–5.1)
ALP SERPL-CCNC: 79 U/L (ref 38–126)
ALT SERPL W/O P-5'-P-CCNC: 9 U/L (ref 11–66)
ANION GAP SERPL CALC-SCNC: 10 MEQ/L (ref 8–16)
AST SERPL-CCNC: 16 U/L (ref 5–40)
BASOPHILS ABSOLUTE: 0 THOU/MM3 (ref 0–0.1)
BASOPHILS ABSOLUTE: 0 THOU/MM3 (ref 0–0.1)
BASOPHILS NFR BLD AUTO: 0.5 %
BASOPHILS NFR BLD AUTO: 0.5 %
BILIRUB SERPL-MCNC: 0.2 MG/DL (ref 0.3–1.2)
BUN SERPL-MCNC: 16 MG/DL (ref 7–22)
CA-I BLD ISE-SCNC: 1.22 MMOL/L (ref 1.12–1.32)
CALCIUM SERPL-MCNC: 9 MG/DL (ref 8.5–10.5)
CHLORIDE SERPL-SCNC: 103 MEQ/L (ref 98–111)
CO2 SERPL-SCNC: 26 MEQ/L (ref 23–33)
CREAT SERPL-MCNC: 3.5 MG/DL (ref 0.4–1.2)
DEPRECATED RDW RBC AUTO: 52.9 FL (ref 35–45)
DEPRECATED RDW RBC AUTO: 54.5 FL (ref 35–45)
EOSINOPHIL NFR BLD AUTO: 2.1 %
EOSINOPHIL NFR BLD AUTO: 2.8 %
EOSINOPHILS ABSOLUTE: 0.2 THOU/MM3 (ref 0–0.4)
EOSINOPHILS ABSOLUTE: 0.2 THOU/MM3 (ref 0–0.4)
ERYTHROCYTE [DISTWIDTH] IN BLOOD BY AUTOMATED COUNT: 14.1 % (ref 11.5–14.5)
ERYTHROCYTE [DISTWIDTH] IN BLOOD BY AUTOMATED COUNT: 14.4 % (ref 11.5–14.5)
GFR SERPL CREATININE-BSD FRML MDRD: 14 ML/MIN/1.73M2
GLUCOSE BLD STRIP.AUTO-MCNC: 113 MG/DL (ref 70–108)
GLUCOSE BLD STRIP.AUTO-MCNC: 140 MG/DL (ref 70–108)
GLUCOSE BLD STRIP.AUTO-MCNC: 151 MG/DL (ref 70–108)
GLUCOSE BLD STRIP.AUTO-MCNC: 153 MG/DL (ref 70–108)
GLUCOSE BLD STRIP.AUTO-MCNC: 64 MG/DL (ref 70–108)
GLUCOSE BLD STRIP.AUTO-MCNC: 65 MG/DL (ref 70–108)
GLUCOSE BLD STRIP.AUTO-MCNC: 70 MG/DL (ref 70–108)
GLUCOSE BLD STRIP.AUTO-MCNC: 80 MG/DL (ref 70–108)
GLUCOSE BLD STRIP.AUTO-MCNC: 85 MG/DL (ref 70–108)
GLUCOSE BLD STRIP.AUTO-MCNC: 95 MG/DL (ref 70–108)
GLUCOSE SERPL-MCNC: 107 MG/DL (ref 70–108)
HCT VFR BLD AUTO: 36 % (ref 37–47)
HCT VFR BLD AUTO: 36.3 % (ref 37–47)
HGB BLD-MCNC: 10.6 GM/DL (ref 12–16)
HGB BLD-MCNC: 10.8 GM/DL (ref 12–16)
IMM GRANULOCYTES # BLD AUTO: 0.03 THOU/MM3 (ref 0–0.07)
IMM GRANULOCYTES # BLD AUTO: 0.05 THOU/MM3 (ref 0–0.07)
IMM GRANULOCYTES NFR BLD AUTO: 0.5 %
IMM GRANULOCYTES NFR BLD AUTO: 0.6 %
LACTIC ACID, SEPSIS: 0.9 MMOL/L (ref 0.5–1.9)
LACTIC ACID, SEPSIS: 1.7 MMOL/L (ref 0.5–1.9)
LYMPHOCYTES ABSOLUTE: 1.8 THOU/MM3 (ref 1–4.8)
LYMPHOCYTES ABSOLUTE: 1.8 THOU/MM3 (ref 1–4.8)
LYMPHOCYTES NFR BLD AUTO: 23.1 %
LYMPHOCYTES NFR BLD AUTO: 30.2 %
MCH RBC QN AUTO: 30.5 PG (ref 26–33)
MCH RBC QN AUTO: 30.5 PG (ref 26–33)
MCHC RBC AUTO-ENTMCNC: 29.4 GM/DL (ref 32.2–35.5)
MCHC RBC AUTO-ENTMCNC: 29.8 GM/DL (ref 32.2–35.5)
MCV RBC AUTO: 102.5 FL (ref 81–99)
MCV RBC AUTO: 103.7 FL (ref 81–99)
MONOCYTES ABSOLUTE: 0.5 THOU/MM3 (ref 0.4–1.3)
MONOCYTES ABSOLUTE: 0.8 THOU/MM3 (ref 0.4–1.3)
MONOCYTES NFR BLD AUTO: 10.3 %
MONOCYTES NFR BLD AUTO: 8.1 %
NEUTROPHILS NFR BLD AUTO: 57.9 %
NEUTROPHILS NFR BLD AUTO: 63.4 %
NRBC BLD AUTO-RTO: 0 /100 WBC
NRBC BLD AUTO-RTO: 0 /100 WBC
OSMOLALITY SERPL CALC.SUM OF ELEC: 279.2 MOSMOL/KG (ref 275–300)
PHOSPHATE SERPL-MCNC: 2.5 MG/DL (ref 2.4–4.7)
PLATELET # BLD AUTO: 202 THOU/MM3 (ref 130–400)
PLATELET # BLD AUTO: 215 THOU/MM3 (ref 130–400)
PMV BLD AUTO: 10.3 FL (ref 9.4–12.4)
PMV BLD AUTO: 10.4 FL (ref 9.4–12.4)
POTASSIUM SERPL-SCNC: 4 MEQ/L (ref 3.5–5.2)
PROT SERPL-MCNC: 5.5 G/DL (ref 6.1–8)
PTH-INTACT SERPL-MCNC: 157.6 PG/ML (ref 15–65)
RBC # BLD AUTO: 3.47 MILL/MM3 (ref 4.2–5.4)
RBC # BLD AUTO: 3.54 MILL/MM3 (ref 4.2–5.4)
SEGMENTED NEUTROPHILS ABSOLUTE COUNT: 3.5 THOU/MM3 (ref 1.8–7.7)
SEGMENTED NEUTROPHILS ABSOLUTE COUNT: 5.1 THOU/MM3 (ref 1.8–7.7)
SODIUM SERPL-SCNC: 139 MEQ/L (ref 135–145)
TROPONIN T: 0.02 NG/ML
WBC # BLD AUTO: 6.1 THOU/MM3 (ref 4.8–10.8)
WBC # BLD AUTO: 8 THOU/MM3 (ref 4.8–10.8)

## 2023-03-03 PROCEDURE — 85025 COMPLETE CBC W/AUTO DIFF WBC: CPT

## 2023-03-03 PROCEDURE — 83605 ASSAY OF LACTIC ACID: CPT

## 2023-03-03 PROCEDURE — 80053 COMPREHEN METABOLIC PANEL: CPT

## 2023-03-03 PROCEDURE — 99232 SBSQ HOSP IP/OBS MODERATE 35: CPT | Performed by: PHYSICIAN ASSISTANT

## 2023-03-03 PROCEDURE — 84100 ASSAY OF PHOSPHORUS: CPT

## 2023-03-03 PROCEDURE — 36415 COLL VENOUS BLD VENIPUNCTURE: CPT

## 2023-03-03 PROCEDURE — 6370000000 HC RX 637 (ALT 250 FOR IP): Performed by: NURSE PRACTITIONER

## 2023-03-03 PROCEDURE — 83970 ASSAY OF PARATHORMONE: CPT

## 2023-03-03 PROCEDURE — 96372 THER/PROPH/DIAG INJ SC/IM: CPT

## 2023-03-03 PROCEDURE — 90935 HEMODIALYSIS ONE EVALUATION: CPT

## 2023-03-03 PROCEDURE — 82948 REAGENT STRIP/BLOOD GLUCOSE: CPT

## 2023-03-03 PROCEDURE — 6360000002 HC RX W HCPCS: Performed by: NURSE PRACTITIONER

## 2023-03-03 PROCEDURE — G0378 HOSPITAL OBSERVATION PER HR: HCPCS

## 2023-03-03 PROCEDURE — 2580000003 HC RX 258: Performed by: INTERNAL MEDICINE

## 2023-03-03 PROCEDURE — 99232 SBSQ HOSP IP/OBS MODERATE 35: CPT | Performed by: INTERNAL MEDICINE

## 2023-03-03 PROCEDURE — 82330 ASSAY OF CALCIUM: CPT

## 2023-03-03 PROCEDURE — 6370000000 HC RX 637 (ALT 250 FOR IP): Performed by: INTERNAL MEDICINE

## 2023-03-03 RX ORDER — BUPROPION HYDROCHLORIDE 100 MG/1
100 TABLET, EXTENDED RELEASE ORAL DAILY
Status: DISCONTINUED | OUTPATIENT
Start: 2023-03-03 | End: 2023-03-04 | Stop reason: HOSPADM

## 2023-03-03 RX ADMIN — Medication 16 G: at 04:46

## 2023-03-03 RX ADMIN — HEPARIN SODIUM 5000 UNITS: 5000 INJECTION INTRAVENOUS; SUBCUTANEOUS at 21:12

## 2023-03-03 RX ADMIN — SODIUM CHLORIDE: 9 INJECTION, SOLUTION INTRAVENOUS at 15:56

## 2023-03-03 RX ADMIN — POLYETHYLENE GLYCOL 3350 17 G: 17 POWDER, FOR SOLUTION ORAL at 15:53

## 2023-03-03 RX ADMIN — BUPROPION HYDROCHLORIDE 100 MG: 100 TABLET, FILM COATED, EXTENDED RELEASE ORAL at 15:52

## 2023-03-03 RX ADMIN — FOLIC ACID 500 MCG: 1 TABLET ORAL at 09:28

## 2023-03-03 RX ADMIN — ATORVASTATIN CALCIUM 10 MG: 10 TABLET, FILM COATED ORAL at 21:12

## 2023-03-03 RX ADMIN — DOCUSATE SODIUM 100 MG: 100 CAPSULE, LIQUID FILLED ORAL at 15:53

## 2023-03-03 RX ADMIN — Medication 16 G: at 16:12

## 2023-03-03 RX ADMIN — HEPARIN SODIUM 5000 UNITS: 5000 INJECTION INTRAVENOUS; SUBCUTANEOUS at 09:28

## 2023-03-03 RX ADMIN — CLOPIDOGREL BISULFATE 75 MG: 75 TABLET ORAL at 09:28

## 2023-03-03 RX ADMIN — ASPIRIN 81 MG: 81 TABLET, COATED ORAL at 09:28

## 2023-03-03 RX ADMIN — MONTELUKAST SODIUM 10 MG: 10 TABLET ORAL at 09:28

## 2023-03-03 NOTE — PROGRESS NOTES
Comprehensive Nutrition Assessment    Type and Reason for Visit:  Initial, Positive Nutrition Screen, Consult (poor appetite/intake, unintentional weight loss)    Nutrition Recommendations/Plan:   Consider renal MVI, folbee plus. ONS: Ensure Compact BID (she will only drink chocolate ONS). Continue current diet. Renal diet basics reviewed 12/19/22, followed by outpatient dialysis dietitians. Malnutrition Assessment:  Malnutrition Status: Moderate malnutrition (03/03/23 1442)    Context:  Acute Illness     Findings of the 6 clinical characteristics of malnutrition:  Energy Intake:  75% or less of estimated energy requirements for 7 or more days  Weight Loss:  Unable to assess (hard to assess due to started hemodialysis 12/2022, weight down 10.6% in the last 3 months)     Body Fat Loss:  Mild body fat loss Orbital, Triceps   Muscle Mass Loss:  Mild muscle mass loss Temples (temporalis)  Fluid Accumulation:  No significant fluid accumulation     Strength:  Not Performed    Nutrition Assessment:     Pt. moderately malnourished AEB criteria as listed above. At risk for further nutrition compromise r/t increased nutrient needs d/t known losses with dialysis, admit with hypercalcemia, ERUM on CKD, hypoglycemia, and underlying medical condition (CHF, CKD, CVA, depression, fibromyalgia, HLD, HTN IBS, CKD-hemodialysis started 12/2022). Nutrition Related Findings:    Pt. Report/Treatments/Miscellaneous: Patient seen in dialysis, known to writer from admit 12/2022-dialysis initiated then. Reports poor appetite/intake in the last couple of months. Agreeable to ONS. Note patient skipped outpatient HD appointment d/tweakness. GI Status: constipated, small BM today  Pertinent Labs: 3/3/23: Sodium 139, Potassium 4, BUN 16, Creatinine3.5, Glucose 107, Chemstick 153, 85  Pertinent Meds: lipitor, folvite, 0.9 Nacl at 60 ml/hr.      Wound Type: None       Current Nutrition Intake & Therapies:    Average Meal Intake: 1-25%     ADULT DIET; Regular; Low Sodium (2 gm); Low Potassium (Less than 3000 mg/day); Low Phosphorus (Less than 1000 mg); 2000 ml  ADULT ORAL NUTRITION SUPPLEMENT; Breakfast, Dinner; Standard 4 oz Oral Supplement    Anthropometric Measures:  Height: 5' (152.4 cm)  Ideal Body Weight (IBW): 100 lbs (45 kg)    Admission Body Weight: 105 lb 6.1 oz (47.8 kg) (3/3/23 no edema, receives hemodialysis)  Current Body Weight: 105 lb 6.1 oz (47.8 kg) (3/3/23 no edema, hemodialysis today),      Current BMI (kg/m2): 20.6  Usual Body Weight:  (~ 125# per pt. Per EMR: 2/15/22: 121#13oz, 9/19/22: 121#3oz, 12/17/22: 117#14oz, 12/30/22: 109#6oz)                       BMI Categories: Underweight (BMI less than 22) age over 72    Estimated Daily Nutrient Needs:  Energy Requirements Based On: Kcal/kg  Weight Used for Energy Requirements:  (48 kg)  Energy (kcal/day): ~ 1440 kcals (30 kcals/kg/day)  Weight Used for Protein Requirements:  (48 kg)  Protein (g/day): ~ 58-67 grams (1.2-1.4 grams/kg/day)         Nutrition Diagnosis:   Moderate malnutrition, In context of acute illness or injury related to inadequate protein-energy intake as evidenced by Criteria as identified in malnutrition assessment    Nutrition Interventions:   Food and/or Nutrient Delivery: Continue Current Diet, Start Oral Nutrition Supplement  Nutrition Education/Counseling: Education initiated (Encouraged oral intake and ONS use.)  Coordination of Nutrition Care: Continue to monitor while inpatient       Goals:     Goals: PO intake 75% or greater, by next RD assessment       Nutrition Monitoring and Evaluation:      Food/Nutrient Intake Outcomes: Food and Nutrient Intake, Supplement Intake  Physical Signs/Symptoms Outcomes: Biochemical Data, GI Status, Fluid Status or Edema, Nutrition Focused Physical Findings, Weight    Discharge Planning:     Too soon to determine     Edi Salcido RD, LD  Contact: [phone]

## 2023-03-03 NOTE — FLOWSHEET NOTE
03/02/23 2058   Vital Signs   /78   Temp (!) 96.4 °F (35.8 °C)   Heart Rate 97   Resp 18   SpO2 97 %   Post-Hemodialysis Assessment   Post-Treatment Procedures Blood returned;Catheter Capped, clamped with Saline x2 ports   Machine Disinfection Process Acid/Vinegar Clean;Heat Disinfect; Exterior Machine Disinfection   Rinseback Volume (ml) 400 ml   Blood Volume Processed (Liters) 50.5 l/min   Dialyzer Clearance Clear   Duration of Treatment (minutes) 150 minutes   Heparin Amount Administered During Treatment (mL) 0 mL   Hemodialysis Intake (ml) 400 ml   Hemodialysis Output (ml) 127 ml   NET Removed (ml) -273   stable 3 hour treatment. 0 net uf. cath lines flushed with 0.9 ns, clamped and tegos applied, dressing changed per protocol. report called to primary nurse.

## 2023-03-03 NOTE — PROGRESS NOTES
[facility]   Nephrology progress note  3/3/2023, 10:43 AM      Pt Name:    Lanetta Goltz  MRN:     [id number]     YOB: 1955  Admit Date:    3/2/2023 12:59 PM    Chief Complaint: Nephrology following for ERUM needing hemodialysis and hypercalcemia.     Subjective:  Patient seen and examined  No chest pain or shortness of breath  Patient is looking so much better mental status much improved  Communicating well     Objective:  24HR INTAKE/OUTPUT:    Intake/Output Summary (Last 24 hours) at 3/3/2023 1043  Last data filed at 3/2/2023 2238  Gross per 24 hour   Intake 410 ml   Output 127 ml   Net 283 ml      Admission weight: 105 lb (47.6 kg)  Wt Readings from Last 3 Encounters:   03/02/23 105 lb (47.6 kg)   02/01/23 101 lb (45.8 kg)   01/10/23 108 lb (49 kg)        Vitals :   Vitals:    03/02/23 2115 03/02/23 2330 03/03/23 0342 03/03/23 0915   BP: 112/75  135/71 (!) 148/74   Pulse: 85 87  83   Resp: 18 18 17 14   Temp: 97.9 °F (36.6 °C) 99 °F (37.2 °C) 97.8 °F (36.6 °C) 98.2 °F (36.8 °C)   TempSrc: Oral Oral Oral Oral   SpO2: 99% 99% 99% 97%   Weight:       Height:           Physical examination  General Appearance: Cachectic and ill-appearing  Mouth/Throat:  Oral mucosa moist  Neck:  Supple, no JVD  Lungs:  Breath sounds: clear  Heart[de-identified]  S1,S2 heard  Abdomen:  Soft, non - tender  Musculoskeletal:  Edema - none    Medications:  Infusion:    dextrose      sodium chloride      sodium chloride 60 mL/hr at 03/02/23 2237     Meds:    aspirin  81 mg Oral Daily    buPROPion  200 mg Oral BID    clopidogrel  75 mg Oral Daily    folic acid  654 mcg Oral Daily    montelukast  10 mg Oral Daily    atorvastatin  10 mg Oral Daily    sodium chloride flush  5-40 mL IntraVENous 2 times per day    heparin (porcine)  5,000 Units SubCUTAneous BID       Lab Data :  CBC:   Recent Labs     03/02/23  1614 03/02/23  2346 03/03/23  0505   WBC 5.8 8.0 6.1   HGB 12.2 10.6* 10.8*   HCT 40.6 36.0* 36.3*    202 215     CMP:  Recent Labs     03/02/23  1455 03/03/23  0506    139   K 5.2 4.0    103   CO2 24 26   BUN 48* 16   CREATININE 8.2* 3.5*   GLUCOSE 46* 107   CALCIUM 14.2* 9.0   MG 2.5*  --    PHOS  --  2.5     Hepatic:   Recent Labs     03/02/23  1455 03/03/23  0506   LABALBU 4.2 3.7   AST 16 16   ALT 8* 9*   BILITOT 0.3 0.2*   ALKPHOS 90 79       Assessment and Plan:  Renal -acute kidney injury secondary to ATN needing hemodialysis so far no signs of renal recovery  Due to significantly elevated hypercalcemia in the setting of renal failure dialyzed the patient on 3/2/2023  We will get her dialyzed again today however we will use 2.25 calcium bath  Electrolytes -within normal limits potassium is 4.0 we will dialyze on a 4K bath  Essential hypertension well continue current medications  Hypercalcemia most likely due to combination of iatrogenic and possibly due to volume depletion from poor oral intake status post HD on a 2 calcium bath looking much better. Calcitriol and calcium supplements upon discharge. Also getting IV fluids which I will continue for today  Renal dysfunction trending slightly lower mostly due to IV fluids and volume depletion  Meds reviewed and discussed with patient  and nursing staff in detail    Cristy Culver MD  [facility]    This report has been created using voice recognition software.  It may contain minor errors which are inherent in voice recognition technology

## 2023-03-03 NOTE — ED PROVIDER NOTES
[address]    Pt Name: Janeth George  MRN: [id number]  [place] 1955  Date of evaluation: 9/12/20      I personally saw and examined the patient. I have reviewed and agree with the Resident findings, including all diagnostic interpretations and treatment plans as written. I was present for the key portion of any procedures performed and the inclusive time noted in any critical care statement. History: This patient was seen with Suzy Joya, resident physician    This is a 49-year-old female, lives at home with spouse. She has been less active and not getting up not eating and drinking and then ultimately missed her dialysis appointment this past Monday. Does seem intermittently confused although not consistently so. May be a bit encephalopathic. Notable abnormalities include a markedly elevated calcium level of 14.2. Troponin is noted to be elevated as well    The resident has discussed the case with nephrology to arrange for dialysis today and she will be admitted.            DO Reta eTllo DO  03/02/23 1921

## 2023-03-03 NOTE — CONSULTS
Patient seen and examined by me during hemodialysis tolerating procedure well  Mild tremors not much of a historian  Clinically looks dry no UF blood pressure 459 systolic  We will get her dialyzed again tomorrow morning  Check a CMP and Antonieta Sethi MD

## 2023-03-03 NOTE — PROGRESS NOTES
Hospitalist Progress Note        Patient: Rachele Porter               Unit/Bed:6K-13/013-A  YOB: 1955  MRN: [id number]       Acct: [de-identified]   PCP: Kiana Díaz MD  Date of Admission: 3/2/2023     Assessment/Plan:  ERUM on CKD Stage IV, secondary to ATN:  Nephrology following. Pt was admitted with Cr 8.2, today 3.5. Baseline Cr 3.3. Dialysis on 3/02 and 3/03. On chronic dialysis, no signs of renal recovery. Continue IVF and recheck BMP in AM per Nephrology. Hypercalcemia: Likely secondary to ERUM. 3/02 Ionized Calcium was 1.7, 1.22 this AM.  Serum Ca on admission was 14.2, down to 9 this AM. IVF, recheck BMP in AM, nephrology following. Hypoglycemia: Upon admission BS 46, 03/03 AM sugars 113, 64, 95, and 153,  last check was 85. Q2hr glucose checks, hypoglycemia protocol in place. RBBB:  EKG 03/02/3023: \"Normal sinus rhythm, Right bundle branch block Abnormal ECG when compared with ECG of 22-DEC-2022 05:06. \"  Telemetry monitoring in place. HFpEF, chronic: Last Echo 2/23/23 showed EF 55-60% with grade 1 systolic dysfunction and a moderately dilated left atrium. Telemetry monitoring in place and no signs of fluid overload. History of CVA: No current neuro deficits noted on exam.     Chief Complaint: Abdominal pain     Initial H and P:-    Initial H and P 03/03/2023: \"Deloris Brooke is a 69 y.o. female with PMHx of ESRD on HD, HTN, orthostatic hypotension, HLP, CVA, AMISH who presented to [address] [place] [address] with chief complaint of nausea, fatigue, generalized weakness. Patient is alert, oriented x 3 but is a poor historian. Appears frail, weak, tremulous. Has multiple blankets. States she has been experiencing nausea x 2 weeks. Few episodes of non bloody emesis. States appetite very poor and only taking medications \"once in a while\". [place] states she has been feeling very weak and tired. Patient is on HD Monday and Fridays.  States she missed last two sessions due to nausea. Denies fever, chest pain, shortness of breath, abdominal pain. Cannot recall last bowel movement. On initial presentation to ED, patient afebrile, HR 80-90, hypertension 174/83, O2 sat adequate on room air. She was hypoglycemic. Chemistries significant for BUN 48, sCr 8.2, CA++ 14.2, iCa++ 1.70, magnesium 2.5. BNP elevated, troponin elevated. CBC without leukocytosis or anemia. UA without suggestion of infection. CXR non acute. EKG: no acute ischemic changes. Patient received glucagon and BS improving. ED provider contacted nephrology, plan HD. No need to treat hypercalcemia at this time. Hospitalization for further medical management. \"     Subjective (past 24 hours):   03/03/2023: Javier Howe is a 70 yo female with a pertinent PMH of CVA, HTN, CHF, and CKD Stage IV  admitted on 03/02/23 for fatigue and decreased appetite for the last 4-5 days. Pt shares that they skipped dialysis on Monday due to nausea and since then her symptoms have worsened. Pt shares that she did not sleep well overnight despite being tired. Pt denies pain, fevers, chills, dizziness, vomiting, diarrhea, chest pain, and SOB. Past medical history, family history, social history and allergies reviewed again and is unchanged since admission. ROS (All review of systems completed. Pertinent positives noted.  Otherwise All other systems reviewed and negative.)      Medications:  Reviewed     Infusion Medications   Infusions Meds    dextrose      sodium chloride      sodium chloride 60 mL/hr at 03/02/23 2237         Scheduled Medications   Scheduled Medications    buPROPion  100 mg Oral Daily    aspirin  81 mg Oral Daily    clopidogrel  75 mg Oral Daily    folic acid  405 mcg Oral Daily    montelukast  10 mg Oral Daily    atorvastatin  10 mg Oral Daily    sodium chloride flush  5-40 mL IntraVENous 2 times per day    heparin (porcine)  5,000 Units SubCUTAneous BID         PRN Meds:   PRN Medications   glucose, dextrose bolus **OR** dextrose bolus, glucagon (rDNA), dextrose, docusate sodium, traZODone, sodium chloride flush, sodium chloride, ondansetron **OR** ondansetron, polyethylene glycol, acetaminophen **OR** acetaminophen           Intake/Output Summary (Last 24 hours) at 3/3/2023 1439  Last data filed at 3/3/2023 1058      Gross per 24 hour   Intake 535 ml   Output 127 ml   Net 408 ml         Diet:  ADULT DIET; Regular; Low Sodium (2 gm); Low Potassium (Less than 3000 mg/day); Low Phosphorus (Less than 1000 mg); 2000 ml  ADULT ORAL NUTRITION SUPPLEMENT; Breakfast, Dinner; Standard 4 oz Oral Supplement     Physical Exam:  BP (!) 147/62   Pulse 92   Temp 97.5 °F (36.4 °C)   Resp 13   Ht 5' (1.524 m)   Wt 105 lb 6.1 oz (47.8 kg)   SpO2 97%   BMI 20.58 kg/m²   General appearance: Chronically ill-appearing. No apparent distress, appears stated age and cooperative. Eyes:  anicteric sclera  HENT: Head normal in appearance. External nares normal.  Oral mucosa dry. Hearing grossly intact. Neck: Supple. R upper chest SC HD catheter site without erythema or drainage  Respiratory:  Normal respiratory effort. Clear to auscultation, bilaterally without Rales/Wheezes/Rhonchi. Cardiovascular: Regular rate and rhythm with normal S1/S2 without murmurs, rubs or gallops. Abdomen: Soft, non-tender, non-distended with normal bowel sounds. Musculoskeletal: Normal tone. Skin: Skin color, texture, turgor normal.  Dialysis catheter intact clean, dry, and intact. No rashes or lesions. Neurologic:  Neurovascularly intact without any focal sensory/motor deficits.  Cranial nerves: II-XII intact, grossly non-focal.  Psychiatric: Alert and oriented x 4, thought content appropriate, normal insight     Labs:         Recent Labs     03/02/23  1614 03/02/23  2346 03/03/23  0505   WBC 5.8 8.0 6.1   HGB 12.2 10.6* 10.8*   HCT 40.6 36.0* 36.3*    202 215           Recent Labs     03/02/23  1455 03/03/23  0506    139   K 5.2 4.0    103   CO2 24 26   BUN 48* 16   CREATININE 8.2* 3.5*   CALCIUM 14.2* 9.0   PHOS  --  2.5           Recent Labs     03/02/23  1455 03/03/23  0506   AST 16 16   ALT 8* 9*   BILIDIR <0.2  --    BILITOT 0.3 0.2*   ALKPHOS 90 79      No results for input(s): INR in the last 72 hours. No results for input(s): Flex Goldberg in the last 72 hours. Microbiology:    Blood culture #1: No results found for: BC     Blood culture #2:No results found for: BLOODCULT2     Organism:        Lab Results   Component Value Date/Time     ORG Mixed Growth 02/02/2023 04:00 PM       No results found for: LABGRAM     MRSA culture only:No results found for: [facility]     Urine culture:          Lab Results   Component Value Date/Time     LABURIN   12/17/2022 11:26 PM       No growth-preliminary Growth of Contaminants. The mixture of organisms present are not a common cause of urinary tract infections and probably represent skin baudilio or distal urethral baudilio. LABURIN 200 MG/dl 01/15/2019 10:18 AM         Respiratory culture: No results found for: CULTRESP     Aerobic and Anaerobic :  No results found for: LABAERO  No results found for: LABANAE     Urinalysis:            Lab Results   Component Value Date/Time     NITRU NEGATIVE 03/02/2023 02:50 PM     WBCUA 2-4 03/02/2023 02:50 PM     BACTERIA NONE SEEN 03/02/2023 02:50 PM     RBCUA 0-2 03/02/2023 02:50 PM     BLOODU NEGATIVE 03/02/2023 02:50 PM     SPECGRAV 1.009 12/29/2022 03:30 PM     GLUCOSEU NEGATIVE 03/02/2023 02:50 PM         Radiology:  XR CHEST (2 VW)   Final Result   There is no acute intrathoracic process. **This report has been created using voice recognition software. It may contain minor errors which are inherent in voice recognition technology. **       Final report electronically signed by Dr Guillermo Mullins on 3/2/2023 2:11 PM          XR CHEST (2 VW)     Result Date: 3/2/2023  PROCEDURE: XR CHEST (2 VW) CLINICAL INFORMATION: 76-year-old female with fatigue. Weakness. COMPARISON: Radiographs 12/16/2022. TECHNIQUE: PA and lateral views of the chest were obtained. FINDINGS: Surgical hardware is present in the right humerus. There is fusion hardware in the cervical spine. A hemodialysis catheter is present with right IJ access. The lungs are clear. The cardiac silhouette and pulmonary vasculature are within normal limits. There is no significant pleural effusion or pneumothorax. Visualized portions of the upper abdomen are within normal limits. The osseous structures are intact. No acute fractures or suspicious osseous lesions. There is no acute intrathoracic process. **This report has been created using voice recognition software. It may contain minor errors which are inherent in voice recognition technology. ** Final report electronically signed by Dr Andrew Quinones on 3/2/2023 2:11 PM    Electronically signed by Isiah Nieto on 3/3/2023 at 2:39 PM  Electronically signed by DENISSE Roca on 3/3/2023 at 2:56 PM

## 2023-03-03 NOTE — CARE COORDINATION
Case Management Assessment  Initial Evaluation    Date/Time of Evaluation: 3/3/2023 8:50 AM  Assessment Completed by: Cassandra Cooley RN    If patient is discharged prior to next notation, then this note serves as note for discharge by case management. Patient Name: Rachele Porter                   YOB: 1955  Diagnosis: Hypercalcemia [E83.52]  Hypoglycemia [E16.2]  Generalized weakness [R53.1]  Acute renal failure, unspecified acute renal failure type [facility]) [N17.9]                   Date / Time: 3/2/2023 12:59 PM  [facility]: [facility]     Patient Admission Status: Observation   Readmission Risk Low 0-14, Mod 15-19), High > 20: Readmission Risk Score: 17    Current PCP: Kiana Díaz MD  PCP verified by CM? Yes    Chart Reviewed: Yes      History Provided by: Patient  Patient Orientation: Alert and Oriented    Patient Cognition: Alert    Hospitalization in the last 30 days (Readmission):  No    If yes, Readmission Assessment in CM Navigator will be completed. Advance Directives:      Code Status: Full Code   Patient's Primary Decision Maker is: Named in Scanned ACP Document    Primary Decision Maker: Rae Carr Spouse - [phone]    Discharge Planning:    Patient lives with: Spouse/Significant Other Type of Home: House  Primary Care Giver: Self  Patient Support Systems include: Spouse/Significant Other, Family Members   Current Financial resources: Medicare  Current community resources: None  Current services prior to admission: Durable Medical Equipment            Current DME: Walker            Type of Home Care services:  None    ADLS  Prior functional level: Assistance with the following:, Shopping, Housework  Current functional level: Assistance with the following:, Shopping, Housework    Family can provide assistance at DC: Yes  Would you like Case Management to discuss the discharge plan with any other family members/significant others, and if so, who?  Yes ( in room)  Plans to Return to Present Housing: Yes  Other Identified Issues/Barriers to RETURNING to current housing: n/a  Potential Assistance needed at discharge: N/A            Potential DME:    Patient expects to discharge to: [address] [place] [address] for transportation at discharge: Family    Financial    Payor: Haydeemahamed Garcia / Plan: MEDICARE PART A AND B / Product Type: *No Product type* /     Does insurance require precert for SNF: No    Potential assistance Purchasing Medications:    Meds-to-Beds request: Yes      [address] - [place], [address] [place] [phone]  [address] [facility] [address]  Phone: [phone] Fax: [phone]      Notes:    Factors facilitating achievement of predicted outcomes: Family support, Motivated, Cooperative, Pleasant, Good insight into deficits, and Has needed Durable Medical Equipment at home    Barriers to discharge: n/a    Additional Case Management Notes: Presented to ED with fatigue, decreased appetite, nausea. HD MWF but has missed chair times this week r/t condition. Recent UTI, finished atb. Hospitalist following. Nephro consult. Emergent dialysis last night. Creatinine 3.5. I-charlene 1.22, down from 1.7 upon arrival. Home meds. PRN zofran. Dietitian consult. Orthostatics. Procedure:   3/2 CXR: no acute findings  3/2 Emergent HD    The Plan for Transition of Care is related to the following treatment goals of Hypercalcemia [E83.52]  Hypoglycemia [E16.2]  Generalized weakness [R53.1]  Acute renal failure, unspecified acute renal failure type [facility]) [N17.9]    Patient Goals/Plan/Treatment Preferences: Spoke with Dona Raleigh and . She plans to return home at discharge. Uses a walker. Recent HH discharge, does not feel services are needed at this time. HD on Mondays and Fridays at 3:10pm at [facility]. Denies needs. Transportation/Food Security/Housekeeping Addressed: No issues identified.      Jesse Sotelo RN  Case Management Department

## 2023-03-03 NOTE — H&P
Hospitalist Progress Note      Patient: Jaiden Cheung    Unit/Bed:6K-13/013-A  YOB: 1955  MRN: [id number]   Acct: [de-identified]   PCP: Yue Cruz MD  Date of Admission: 3/2/2023    Assessment/Plan:  ERUM on CKD Stage IV, secondary to ATN:  Nephrology following. Pt was admitted with Cr 8.2, today 3.5. Baseline Cr 3.3. Dialysis on 3/02 and 3/03. On chronic dialysis, no signs of renal recovery. Continue IVF and recheck BMP in AM per Nephrology. Hypercalcemia: Likely secondary to ERUM. 3/02 Ionized Calcium was 1.7, 1.22 this AM.  Serum Ca on admission was 14.2, down to 9 this AM. IVF, recheck BMP in AM, nephrology following. Hypoglycemia: Upon admission BS 46, 03/03 AM sugars 113, 64, 95, and 153,  last check was 85. Q2hr glucose checks, hypoglycemia protocol in place. RBBB:  EKG 03/02/3023: \"Normal sinus rhythm, Right bundle branch block Abnormal ECG when compared with ECG of 22-DEC-2022 05:06. \"  Telemetry monitoring in place. HFpEF, chronic: Last Echo 2/23/23 showed EF 55-60% with grade 1 systolic dysfunction and a moderately dilated left atrium. Telemetry monitoring in place and no signs of fluid overload. History of CVA: No current neuro deficits noted on exam.    Chief Complaint: Abdominal pain    Initial H and P:-    Initial H and P 03/03/2023: \"Deloris Mcfadden is a 69 y.o. female with PMHx of ESRD on HD, HTN, orthostatic hypotension, HLP, CVA, AMISH who presented to [facility] with chief complaint of nausea, fatigue, generalized weakness. Patient is alert, oriented x 3 but is a poor historian. Appears frail, weak, tremulous. Has multiple blankets. States she has been experiencing nausea x 2 weeks. Few episodes of non bloody emesis. States appetite very poor and only taking medications \"once in a while\". Kori Morales states she has been feeling very weak and tired. Patient is on HD Monday and Fridays.  States she missed last two sessions due to nausea. Denies fever, chest pain, shortness of breath, abdominal pain. Cannot recall last bowel movement. On initial presentation to ED, patient afebrile, HR 80-90, hypertension 174/83, O2 sat adequate on room air. She was hypoglycemic. Chemistries significant for BUN 48, sCr 8.2, CA++ 14.2, iCa++ 1.70, magnesium 2.5. BNP elevated, troponin elevated. CBC without leukocytosis or anemia. UA without suggestion of infection. CXR non acute. EKG: no acute ischemic changes. Patient received glucagon and BS improving. ED provider contacted nephrology, plan HD. No need to treat hypercalcemia at this time. Hospitalization for further medical management. \"    Subjective (past 24 hours):   03/03/2023: Eron Peguero is a 68 yo female with a pertinent PMH of CVA, HTN, CHF, and CKD Stage IV  admitted on 03/02/23 for fatigue and decreased appetite for the last 4-5 days. Pt shares that they skipped dialysis on Monday due to nausea and since then her symptoms have worsened. Pt shares that she did not sleep well overnight despite being tired. Pt denies pain, fevers, chills, dizziness, vomiting, diarrhea, chest pain, and SOB. Past medical history, family history, social history and allergies reviewed again and is unchanged since admission. ROS (All review of systems completed. Pertinent positives noted.  Otherwise All other systems reviewed and negative.)     Medications:  Reviewed    Infusion Medications    dextrose      sodium chloride      sodium chloride 60 mL/hr at 03/02/23 2237     Scheduled Medications    buPROPion  100 mg Oral Daily    aspirin  81 mg Oral Daily    clopidogrel  75 mg Oral Daily    folic acid  266 mcg Oral Daily    montelukast  10 mg Oral Daily    atorvastatin  10 mg Oral Daily    sodium chloride flush  5-40 mL IntraVENous 2 times per day    heparin (porcine)  5,000 Units SubCUTAneous BID     PRN Meds: glucose, dextrose bolus **OR** dextrose bolus, glucagon (rDNA), dextrose, docusate sodium, traZODone, sodium chloride flush, sodium chloride, ondansetron **OR** ondansetron, polyethylene glycol, acetaminophen **OR** acetaminophen      Intake/Output Summary (Last 24 hours) at 3/3/2023 1439  Last data filed at 3/3/2023 1058  Gross per 24 hour   Intake 535 ml   Output 127 ml   Net 408 ml       Diet:  ADULT DIET; Regular; Low Sodium (2 gm); Low Potassium (Less than 3000 mg/day); Low Phosphorus (Less than 1000 mg); 2000 ml  ADULT ORAL NUTRITION SUPPLEMENT; Breakfast, Dinner; Standard 4 oz Oral Supplement    Physical Exam:  BP (!) 147/62   Pulse 92   Temp 97.5 °F (36.4 °C)   Resp 13   Ht 5' (1.524 m)   Wt 105 lb 6.1 oz (47.8 kg)   SpO2 97%   BMI 20.58 kg/m²   General appearance: Chronically ill-appearing. No apparent distress, appears stated age and cooperative. Respiratory:  Normal respiratory effort. Clear to auscultation, bilaterally without Rales/Wheezes/Rhonchi. Cardiovascular: Regular rate and rhythm with normal S1/S2 without murmurs, rubs or gallops. Abdomen: Soft, non-tender, non-distended with normal bowel sounds. Skin: Skin color, texture, turgor normal.  Dialysis catheter intact clean, dry, and intact. No rashes or lesions. Neurologic:  Neurovascularly intact without any focal sensory/motor deficits. Cranial nerves: II-XII intact, grossly non-focal.  Psychiatric: Alert and oriented x 4, thought content appropriate, normal insight    Labs:   Recent Labs     03/02/23  1614 03/02/23  2346 03/03/23  0505   WBC 5.8 8.0 6.1   HGB 12.2 10.6* 10.8*   HCT 40.6 36.0* 36.3*    202 215     Recent Labs     03/02/23  1455 03/03/23  0506    139   K 5.2 4.0    103   CO2 24 26   BUN 48* 16   CREATININE 8.2* 3.5*   CALCIUM 14.2* 9.0   PHOS  --  2.5     Recent Labs     03/02/23  1455 03/03/23  0506   AST 16 16   ALT 8* 9*   BILIDIR <0.2  --    BILITOT 0.3 0.2*   ALKPHOS 90 79     No results for input(s): INR in the last 72 hours.   No results for input(s): Milas Ruts in the last 72 hours.    Microbiology:    Blood culture #1: No results found for: BC    Blood culture #2:No results found for: BLOODCULT2    Organism:  Lab Results   Component Value Date/Time    ORG Mixed Growth 02/02/2023 04:00 PM       No results found for: LABGRAM    MRSA culture only:No results found for: [facility]    Urine culture:   Lab Results   Component Value Date/Time    LABURIN  12/17/2022 11:26 PM     No growth-preliminary Growth of Contaminants. The mixture of organisms present are not a common cause of urinary tract infections and probably represent skin baudilio or distal urethral baudilio. LABURIN 200 MG/dl 01/15/2019 10:18 AM       Respiratory culture: No results found for: CULTRESP    Aerobic and Anaerobic :  No results found for: LABAERO  No results found for: LABANAE    Urinalysis:      Lab Results   Component Value Date/Time    NITRU NEGATIVE 03/02/2023 02:50 PM    WBCUA 2-4 03/02/2023 02:50 PM    BACTERIA NONE SEEN 03/02/2023 02:50 PM    RBCUA 0-2 03/02/2023 02:50 PM    BLOODU NEGATIVE 03/02/2023 02:50 PM    SPECGRAV 1.009 12/29/2022 03:30 PM    GLUCOSEU NEGATIVE 03/02/2023 02:50 PM       Radiology:  XR CHEST (2 VW)   Final Result   There is no acute intrathoracic process. **This report has been created using voice recognition software. It may contain minor errors which are inherent in voice recognition technology. **      Final report electronically signed by Dr Kristina Alvarez on 3/2/2023 2:11 PM        XR CHEST (2 VW)    Result Date: 3/2/2023  PROCEDURE: XR CHEST (2 VW) CLINICAL INFORMATION: 68-year-old female with fatigue. Weakness. COMPARISON: Radiographs 12/16/2022. TECHNIQUE: PA and lateral views of the chest were obtained. FINDINGS: Surgical hardware is present in the right humerus. There is fusion hardware in the cervical spine. A hemodialysis catheter is present with right IJ access. The lungs are clear. The cardiac silhouette and pulmonary vasculature are within normal limits.  There is no significant pleural effusion or pneumothorax. Visualized portions of the upper abdomen are within normal limits. The osseous structures are intact. No acute fractures or suspicious osseous lesions. There is no acute intrathoracic process. **This report has been created using voice recognition software. It may contain minor errors which are inherent in voice recognition technology. ** Final report electronically signed by Dr Walter Matute on 3/2/2023 2:11 PM      Electronically signed by Kavin Jeans on 3/3/2023 at 2:39 PM

## 2023-03-03 NOTE — PROGRESS NOTES
Patient lying in bed, awake and alert, call light within reach. Admission history completed with patient and , patient pleasant, able to make needs known. Patient verbalized consent to virtual nursing, reminded to use call light to call for assistance as needed.

## 2023-03-03 NOTE — PROGRESS NOTES
Pt admitted to  6K13 via stretcher. Complaints: Generalized weakness   No IV line established. Vital signs obtained. Assessment and data collection initiated. Two nurse skin assessment performed by Alcira Faria and Magalie BILL. Oriented to room. Policies and procedures for 6K explained. Yoni Duong RN discussed hourly rounding with patient addressing 5 P's. Fall prevention and safety discussed with patient. Bed alarm on. Call light in reach. All questions answered with no further questions at this time.

## 2023-03-03 NOTE — PLAN OF CARE
Problem: Discharge Planning  Goal: Discharge to home or other facility with appropriate resources  Outcome: Progressing  Flowsheets (Taken 3/3/2023 0228)  Discharge to home or other facility with appropriate resources: Identify barriers to discharge with patient and caregiver  Note: Plans to go home with . Problem: Pain  Goal: Verbalizes/displays adequate comfort level or baseline comfort level  Outcome: Progressing  Flowsheets (Taken 3/3/2023 0228)  Verbalizes/displays adequate comfort level or baseline comfort level:   Encourage patient to monitor pain and request assistance   Assess pain using appropriate pain scale   Administer analgesics based on type and severity of pain and evaluate response   Implement non-pharmacological measures as appropriate and evaluate response     Problem: Skin/Tissue Integrity  Goal: Absence of new skin breakdown  Description: 1. Monitor for areas of redness and/or skin breakdown  2. Assess vascular access sites hourly  3. Every 4-6 hours minimum:  Change oxygen saturation probe site  4. Every 4-6 hours:  If on nasal continuous positive airway pressure, respiratory therapy assess nares and determine need for appliance change or resting period. Outcome: Progressing  Note: Dual skin assessment done and documented with Fashinating. No notable skin breakdown observed. Problem: Safety - Adult  Goal: Free from fall injury  Outcome: Progressing  Note: No falls noted this shift. Bed alarm on, bed in low position. Call light and personal belongings in reach. Patient uses call light appropriately.       Problem: ABCDS Injury Assessment  Goal: Absence of physical injury  Outcome: Progressing  Flowsheets (Taken 3/3/2023 0228)  Absence of Physical Injury: Implement safety measures based on patient assessment     Problem: ABCDS Injury Assessment  Goal: Absence of physical injury  Outcome: Progressing  Flowsheets (Taken 3/3/2023 0228)  Absence of Physical Injury: Implement safety measures based on patient assessment     Care plan reviewed with patient and . Patient and  verbalized understanding of the plan of care and contribute to goal setting.
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
## 2023-03-03 NOTE — FLOWSHEET NOTE
03/03/23 1512   Vital Signs   BP (!) 157/61   Temp 98.7 °F (37.1 °C)   Heart Rate 75   Resp 18   Weight 105 lb 3.2 oz (47.7 kg)   Weight Method Bed scale   Percent Weight Change -0.17   Post-Hemodialysis Assessment   Post-Treatment Procedures Blood returned;Catheter Capped, clamped with Saline x2 ports   Machine Disinfection Process Acid/Vinegar Clean;Heat Disinfect; Exterior Machine Disinfection   Rinseback Volume (ml) 400 ml   Blood Volume Processed (Liters) 59.2 l/min   Dialyzer Clearance Clear   Duration of Treatment (minutes) 180 minutes   Heparin Amount Administered During Treatment (mL) 0 mL   Hemodialysis Intake (ml) 400 ml   Hemodialysis Output (ml) 400 ml   NET Removed (ml) 0   Stable 3 hour treatment complete. Removed no fluid as per order. Tolerated fluid removal well. Dressing clean, dry and intact. Report given to primary RN. Treatment record printed for scanning into EMR.

## 2023-03-04 VITALS
TEMPERATURE: 97.7 F | HEART RATE: 88 BPM | OXYGEN SATURATION: 100 % | RESPIRATION RATE: 16 BRPM | BODY MASS INDEX: 20.65 KG/M2 | SYSTOLIC BLOOD PRESSURE: 146 MMHG | DIASTOLIC BLOOD PRESSURE: 67 MMHG | WEIGHT: 105.2 LBS | HEIGHT: 60 IN

## 2023-03-04 LAB
ANION GAP SERPL CALC-SCNC: 10 MEQ/L (ref 8–16)
ANISOCYTOSIS BLD QL SMEAR: PRESENT
BASOPHILS ABSOLUTE: 0.1 THOU/MM3 (ref 0–0.1)
BASOPHILS NFR BLD AUTO: 0.8 %
BUN SERPL-MCNC: 7 MG/DL (ref 7–22)
CALCIUM SERPL-MCNC: 7.6 MG/DL (ref 8.5–10.5)
CHLORIDE SERPL-SCNC: 111 MEQ/L (ref 98–111)
CO2 SERPL-SCNC: 22 MEQ/L (ref 23–33)
CREAT SERPL-MCNC: 2.5 MG/DL (ref 0.4–1.2)
DEPRECATED RDW RBC AUTO: 61 FL (ref 35–45)
ELLIPTOCYTES: ABNORMAL
EOSINOPHIL NFR BLD AUTO: 1.3 %
EOSINOPHILS ABSOLUTE: 0.1 THOU/MM3 (ref 0–0.4)
ERYTHROCYTE [DISTWIDTH] IN BLOOD BY AUTOMATED COUNT: 14.4 % (ref 11.5–14.5)
GFR SERPL CREATININE-BSD FRML MDRD: 20 ML/MIN/1.73M2
GLUCOSE BLD STRIP.AUTO-MCNC: 116 MG/DL (ref 70–108)
GLUCOSE BLD STRIP.AUTO-MCNC: 88 MG/DL (ref 70–108)
GLUCOSE BLD STRIP.AUTO-MCNC: 94 MG/DL (ref 70–108)
GLUCOSE SERPL-MCNC: 93 MG/DL (ref 70–108)
HCT VFR BLD AUTO: 40.6 % (ref 37–47)
HGB BLD-MCNC: 11.1 GM/DL (ref 12–16)
HYPOCHROMIA BLD QL SMEAR: PRESENT
IMM GRANULOCYTES # BLD AUTO: 0.05 THOU/MM3 (ref 0–0.07)
IMM GRANULOCYTES NFR BLD AUTO: 0.8 %
LYMPHOCYTES ABSOLUTE: 1.7 THOU/MM3 (ref 1–4.8)
LYMPHOCYTES NFR BLD AUTO: 27.5 %
MACROCYTES BLD QL SMEAR: PRESENT
MCH RBC QN AUTO: 31 PG (ref 26–33)
MCHC RBC AUTO-ENTMCNC: 27.3 GM/DL (ref 32.2–35.5)
MCV RBC AUTO: 113.4 FL (ref 81–99)
MONOCYTES ABSOLUTE: 0.6 THOU/MM3 (ref 0.4–1.3)
MONOCYTES NFR BLD AUTO: 9.8 %
NEUTROPHILS NFR BLD AUTO: 59.8 %
NRBC BLD AUTO-RTO: 0 /100 WBC
PLATELET # BLD AUTO: 175 THOU/MM3 (ref 130–400)
PMV BLD AUTO: 10.1 FL (ref 9.4–12.4)
POIKILOCYTES: ABNORMAL
POTASSIUM SERPL-SCNC: 4.6 MEQ/L (ref 3.5–5.2)
RBC # BLD AUTO: 3.58 MILL/MM3 (ref 4.2–5.4)
SCAN OF BLOOD SMEAR: NORMAL
SEGMENTED NEUTROPHILS ABSOLUTE COUNT: 3.8 THOU/MM3 (ref 1.8–7.7)
SODIUM SERPL-SCNC: 143 MEQ/L (ref 135–145)
WBC # BLD AUTO: 6.3 THOU/MM3 (ref 4.8–10.8)

## 2023-03-04 PROCEDURE — 96372 THER/PROPH/DIAG INJ SC/IM: CPT

## 2023-03-04 PROCEDURE — 85025 COMPLETE CBC W/AUTO DIFF WBC: CPT

## 2023-03-04 PROCEDURE — 2580000003 HC RX 258: Performed by: INTERNAL MEDICINE

## 2023-03-04 PROCEDURE — G0378 HOSPITAL OBSERVATION PER HR: HCPCS

## 2023-03-04 PROCEDURE — 6370000000 HC RX 637 (ALT 250 FOR IP): Performed by: NURSE PRACTITIONER

## 2023-03-04 PROCEDURE — 6370000000 HC RX 637 (ALT 250 FOR IP): Performed by: INTERNAL MEDICINE

## 2023-03-04 PROCEDURE — 96361 HYDRATE IV INFUSION ADD-ON: CPT

## 2023-03-04 PROCEDURE — 6360000002 HC RX W HCPCS: Performed by: NURSE PRACTITIONER

## 2023-03-04 PROCEDURE — 80048 BASIC METABOLIC PNL TOTAL CA: CPT

## 2023-03-04 PROCEDURE — 36415 COLL VENOUS BLD VENIPUNCTURE: CPT

## 2023-03-04 PROCEDURE — 96360 HYDRATION IV INFUSION INIT: CPT

## 2023-03-04 PROCEDURE — 99232 SBSQ HOSP IP/OBS MODERATE 35: CPT | Performed by: INTERNAL MEDICINE

## 2023-03-04 PROCEDURE — 82948 REAGENT STRIP/BLOOD GLUCOSE: CPT

## 2023-03-04 PROCEDURE — 99239 HOSP IP/OBS DSCHRG MGMT >30: CPT | Performed by: PHYSICIAN ASSISTANT

## 2023-03-04 RX ORDER — BLOOD-GLUCOSE METER
1 KIT MISCELLANEOUS DAILY
Qty: 1 KIT | Refills: 0 | Status: SHIPPED | OUTPATIENT
Start: 2023-03-04 | End: 2023-03-09 | Stop reason: SDUPTHER

## 2023-03-04 RX ADMIN — FOLIC ACID 500 MCG: 1 TABLET ORAL at 08:41

## 2023-03-04 RX ADMIN — SODIUM CHLORIDE: 9 INJECTION, SOLUTION INTRAVENOUS at 09:02

## 2023-03-04 RX ADMIN — DOCUSATE SODIUM 100 MG: 100 CAPSULE, LIQUID FILLED ORAL at 08:40

## 2023-03-04 RX ADMIN — BUPROPION HYDROCHLORIDE 100 MG: 100 TABLET, FILM COATED, EXTENDED RELEASE ORAL at 08:41

## 2023-03-04 RX ADMIN — ASPIRIN 81 MG: 81 TABLET, COATED ORAL at 08:40

## 2023-03-04 RX ADMIN — MONTELUKAST SODIUM 10 MG: 10 TABLET ORAL at 08:41

## 2023-03-04 RX ADMIN — CLOPIDOGREL BISULFATE 75 MG: 75 TABLET ORAL at 08:40

## 2023-03-04 RX ADMIN — HEPARIN SODIUM 5000 UNITS: 5000 INJECTION INTRAVENOUS; SUBCUTANEOUS at 08:40

## 2023-03-04 NOTE — PLAN OF CARE
Problem: Discharge Planning  Goal: Discharge to home or other facility with appropriate resources  3/4/2023 1011 by Vero Hanna RN  Outcome: Progressing  Flowsheets  Taken 3/4/2023 0905 by Vero Hanna RN  Discharge to home or other facility with appropriate resources: Identify barriers to discharge with patient and caregiver  Taken 3/4/2023 0000 by Linzy Baumgarten, RN  Discharge to home or other facility with appropriate resources: Identify barriers to discharge with patient and caregiver  3/3/2023 2234 by Linzy Baumgarten, RN  Outcome: Progressing  Flowsheets (Taken 3/3/2023 2000)  Discharge to home or other facility with appropriate resources: Identify barriers to discharge with patient and caregiver     Problem: Pain  Goal: Verbalizes/displays adequate comfort level or baseline comfort level  3/4/2023 1011 by Vero Hanna RN  Outcome: Progressing  3/3/2023 2234 by Linzy Baumgarten, RN  Outcome: Progressing  Flowsheets (Taken 3/3/2023 2100)  Verbalizes/displays adequate comfort level or baseline comfort level: Encourage patient to monitor pain and request assistance     Problem: Skin/Tissue Integrity  Goal: Absence of new skin breakdown  Description: 1. Monitor for areas of redness and/or skin breakdown  2. Assess vascular access sites hourly  3. Every 4-6 hours minimum:  Change oxygen saturation probe site  4. Every 4-6 hours:  If on nasal continuous positive airway pressure, respiratory therapy assess nares and determine need for appliance change or resting period.   3/4/2023 1011 by Vero Hanna RN  Outcome: Progressing  3/3/2023 2234 by Linzy Baumgarten, RN  Outcome: Progressing     Problem: Safety - Adult  Goal: Free from fall injury  3/4/2023 1011 by Vero Hanna RN  Outcome: Progressing  Flowsheets  Taken 3/4/2023 0959  Free From Fall Injury: Instruct family/caregiver on patient safety  Taken 3/4/2023 0905  Free From Fall Injury: Instruct family/caregiver on patient safety  3/3/2023 2234 by Mahogany Nur RN  Outcome: Progressing     Problem: ABCDS Injury Assessment  Goal: Absence of physical injury  3/4/2023 1011 by Marietta Pollock RN  Outcome: Progressing  Flowsheets  Taken 3/4/2023 0959  Absence of Physical Injury: Implement safety measures based on patient assessment  Taken 3/4/2023 0905  Absence of Physical Injury: Implement safety measures based on patient assessment  3/3/2023 2234 by Mahogany Nur RN  Outcome: Progressing     Problem: Metabolic/Fluid and Electrolytes - Adult  Goal: Electrolytes maintained within normal limits  3/4/2023 1011 by Marietta Pollock RN  Outcome: Progressing  Flowsheets  Taken 3/4/2023 0905 by Marietta Pollock RN  Electrolytes maintained within normal limits: Monitor labs and assess patient for signs and symptoms of electrolyte imbalances  Taken 3/4/2023 0000 by Mahogany Nur RN  Electrolytes maintained within normal limits: Monitor labs and assess patient for signs and symptoms of electrolyte imbalances  3/3/2023 2234 by Mahogany Nur RN  Outcome: Progressing  Flowsheets (Taken 3/3/2023 2000)  Electrolytes maintained within normal limits: Monitor labs and assess patient for signs and symptoms of electrolyte imbalances     Problem: Chronic Conditions and Co-morbidities  Goal: Patient's chronic conditions and co-morbidity symptoms are monitored and maintained or improved  3/4/2023 1011 by Marietta Pollock RN  Outcome: Progressing  Flowsheets (Taken 3/4/2023 0905)  Care Plan - Patient's Chronic Conditions and Co-Morbidity Symptoms are Monitored and Maintained or Improved: Monitor and assess patient's chronic conditions and comorbid symptoms for stability, deterioration, or improvement  3/3/2023 2234 by Mahogany Nur RN  Outcome: Not Progressing  Note: Unstable blood sugar      Problem: Nutrition Deficit:  Goal: Optimize nutritional status  3/4/2023 1011 by Marietta Pollock RN  Outcome: Progressing  3/3/2023 2234 by Mahogany Nur RN  Outcome: Progressing Problem: Chronic Conditions and Co-morbidities  Goal: Patient's chronic conditions and co-morbidity symptoms are monitored and maintained or improved  3/4/2023 1011 by Roberta Oleary RN  Outcome: Progressing  Flowsheets (Taken 3/4/2023 0905)  Care Plan - Patient's Chronic Conditions and Co-Morbidity Symptoms are Monitored and Maintained or Improved: Monitor and assess patient's chronic conditions and comorbid symptoms for stability, deterioration, or improvement  3/3/2023 2234 by David Bansal RN  Outcome: Not Progressing  Note: Unstable blood sugar

## 2023-03-04 NOTE — PLAN OF CARE
Problem: Chronic Conditions and Co-morbidities  Goal: Patient's chronic conditions and co-morbidity symptoms are monitored and maintained or improved  Outcome: Not Progressing  Note: Unstable blood sugar        Problem: Discharge Planning  Goal: Discharge to home or other facility with appropriate resources  Outcome: Progressing  Flowsheets (Taken 3/3/2023 2000)  Discharge to home or other facility with appropriate resources: Identify barriers to discharge with patient and caregiver     Problem: Pain  Goal: Verbalizes/displays adequate comfort level or baseline comfort level  Outcome: Progressing  Flowsheets (Taken 3/3/2023 2100)  Verbalizes/displays adequate comfort level or baseline comfort level: Encourage patient to monitor pain and request assistance     Problem: Skin/Tissue Integrity  Goal: Absence of new skin breakdown  Description: 1. Monitor for areas of redness and/or skin breakdown  2. Assess vascular access sites hourly  3. Every 4-6 hours minimum:  Change oxygen saturation probe site  4. Every 4-6 hours:  If on nasal continuous positive airway pressure, respiratory therapy assess nares and determine need for appliance change or resting period.   Outcome: Progressing     Problem: Safety - Adult  Goal: Free from fall injury  Outcome: Progressing     Problem: ABCDS Injury Assessment  Goal: Absence of physical injury  Outcome: Progressing     Problem: Metabolic/Fluid and Electrolytes - Adult  Goal: Electrolytes maintained within normal limits  Outcome: Progressing  Flowsheets (Taken 3/3/2023 2000)  Electrolytes maintained within normal limits: Monitor labs and assess patient for signs and symptoms of electrolyte imbalances     Problem: Nutrition Deficit:  Goal: Optimize nutritional status  Outcome: Progressing

## 2023-03-04 NOTE — DISCHARGE SUMMARY
Hospitalist Discharge Summary        Patient: Jearl Dakin  YOB: 1955  MRN: [id number]   Acct: [de-identified]    Primary Care Physician: Dakota Wilson MD    Admit date  3/2/2023    Discharge date: 3/4/2023    Chief Complaint on presentation :-  Abdominal pain    Discharge Assessment and Plan:-   ERUM on CKD Stage IV, secondary to ATN:  Nephrology following. Pt was admitted with Cr 8.2, today 2.5. Baseline Cr 3.3. Dialysis on 3/02 and 3/03. On chronic dialysis, no signs of renal recovery. Encourage PO hydration upon discharge. Per nephrology, pt can be discharged with HD Mon and Fri. F/U BMP in one week prior to dialysis and appt with Nephrology and PCP within 1 week. Hypercalcemia: Likely secondary to ERUM. 3/02 Ionized Calcium was 1.7, 1.22 yesterday. Serum Ca on admission was 14.2, down to 7.6 this AM. IVF until discharge and PO fluids encouraged. Per nephrology, pt can be d/c with HD on Mon and Fri and calcitrol. F/U BMP Monday prior to dialysis to monitor calcium levels and appt with Nephrology and PCP within 1 week. Hypoglycemia, resolved: Upon admission BS 46, 03/04 last check was 94. Maintain nutrition and F/U with PCP within 1 week. RBBB:  EKG 03/02/3023: \"Normal sinus rhythm, Right bundle branch block Abnormal ECG when compared with ECG of 22-DEC-2022 05:06. \"  Stable. HFpEF, chronic: Last Echo 2/23/23 showed EF 55-60% with grade 1 systolic dysfunction and a moderately dilated left atrium. No signs of fluid overload throughout admission. History of CVA: No current neuro deficits noted. Initial H and P and Hospital course:-  Initial H and P 03/03/2023: \"Deloris Panchal is a 69 y.o. female with PMHx of ESRD on HD, HTN, orthostatic hypotension, HLP, CVA, AMISH who presented to [facility] with chief complaint of nausea, fatigue, generalized weakness. Patient is alert, oriented x 3 but is a poor historian. Appears frail, weak, tremulous. Has multiple blankets. States she has been experiencing nausea x 2 weeks. Few episodes of non bloody emesis. States appetite very poor and only taking medications \"once in a while\". [facility] states she has been feeling very weak and tired. Patient is on HD Monday and Fridays. States she missed last two sessions due to nausea. Denies fever, chest pain, shortness of breath, abdominal pain. Cannot recall last bowel movement. On initial presentation to ED, patient afebrile, HR 80-90, hypertension 174/83, O2 sat adequate on room air. She was hypoglycemic. Chemistries significant for BUN 48, sCr 8.2, CA++ 14.2, iCa++ 1.70, magnesium 2.5. BNP elevated, troponin elevated. CBC without leukocytosis or anemia. UA without suggestion of infection. CXR non acute. EKG: no acute ischemic changes. Patient received glucagon and BS improving. ED provider contacted nephrology, plan HD. No need to treat hypercalcemia at this time. Hospitalization for further medical management. \"    03/03/2023: Ariana Tejada is a 68 yo female with a pertinent PMH of CVA, HTN, CHF, and CKD Stage IV  admitted on 03/02/23 for fatigue and decreased appetite for the last 4-5 days. Pt shares that they skipped dialysis on Monday due to nausea and since then her symptoms have worsened. Pt shares that she did not sleep well overnight despite being tired. Pt denies pain, fevers, chills, dizziness, vomiting, diarrhea, chest pain, and SOB. 03/03/2023:  Pt states that she feels better rested today and stronger than the day before. Pt was educated on the importance of dialysis for her to maintain her strength. Pt states that she has been walking in her room with a walker and feels comfortable going home with her . Pt denies pain, fevers, chills, dizziness, vomiting, diarrhea, chest pain, and SOB.       Physical Exam:-  Vitals:   Patient Vitals for the past 24 hrs:   BP Temp Temp src Pulse Resp SpO2 Weight   03/04/23 1138 (!) 146/67 97.7 °F (36.5 °C) Oral 88 16 100 % -- 03/04/23 0907 (!) 159/68 98.2 °F (36.8 °C) Oral 89 16 97 % --   03/04/23 0415 (!) 145/68 98.2 °F (36.8 °C) Oral 93 19 97 % --   03/04/23 0000 136/68 97.9 °F (36.6 °C) Oral 79 17 97 % --   03/03/23 2100 (!) 129/90 98 °F (36.7 °C) Oral 79 17 97 % --   03/03/23 1545 (!) 144/75 97.9 °F (36.6 °C) Oral 89 16 96 % --   03/03/23 1512 (!) 157/61 98.7 °F (37.1 °C) -- 75 18 -- 105 lb 3.2 oz (47.7 kg)     Weight:   Weight: 105 lb 3.2 oz (47.7 kg)   24 hour intake/output:     Intake/Output Summary (Last 24 hours) at 3/4/2023 1209  Last data filed at 3/4/2023 7549  Gross per 24 hour   Intake 700 ml   Output 400 ml   Net 300 ml       General appearance: Chronically-ill appearing. No apparent distress and cooperative. Eyes: anicteric sclera  Neck: Supple, with full range of motion. No jugular venous distention. Trachea midline. Respiratory:  Normal respiratory effort. Clear to auscultation, bilaterally without Rales/Wheezes/Rhonchi. Cardiovascular: Regular rate and rhythm with normal S1/S2 without murmurs, rubs or gallops. Abdomen: Soft, non-tender, non-distended with normal bowel sounds. Musculoskeletal:  No clubbing, cyanosis or edema bilaterally. Skin: Skin color, texture, turgor normal.  No rashes or lesions. Neurologic:  Neurovascularly intact without any focal sensory/motor deficits.  Cranial nerves: II-XII intact, grossly non-focal.  Psychiatric: Alert and oriented x 4, thought content appropriate, normal insight      Discharge Medications:-      Medication List        CONTINUE taking these medications      aspirin 81 MG tablet     buPROPion 200 MG extended release tablet  Commonly known as: WELLBUTRIN SR  Take 1 tablet by mouth 2 times daily     calcitRIOL 0.25 MCG capsule  Commonly known as: ROCALTROL  Take 1 capsule by mouth four times a week     cetirizine 5 MG tablet  Commonly known as: ZYRTEC  Take 1 tablet by mouth nightly as needed for Allergies     chlordiazePOXIDE-clidinium 5-2.5 MG per capsule  Commonly known as: LIBRAX  TAKE 1 CAPSULE TWICE A DAY AS NEEDED FOR PAIN     clopidogrel 75 MG tablet  Commonly known as: PLAVIX  Take 1 tablet by mouth daily     docusate sodium 100 MG capsule  Commonly known as: COLACE     folic acid 514 MCG tablet  Commonly known as: FOLVITE     montelukast 10 MG tablet  Commonly known as: SINGULAIR  Take 1 tablet by mouth daily     ondansetron 4 MG disintegrating tablet  Commonly known as: ZOFRAN-ODT  Take 1 tablet by mouth 3 times daily as needed for Nausea or Vomiting     simvastatin 20 MG tablet  Commonly known as: ZOCOR  TAKE 1 TABLET BY MOUTH DAILY     traZODone 50 MG tablet  Commonly known as: DESYREL  TAKE 1 TABLET NIGHTLY     triamcinolone 55 MCG/ACT nasal inhaler  Commonly known as: NASACORT               Labs :-  Recent Results (from the past 72 hour(s))   EKG 12 Lead    Collection Time: 03/02/23  1:29 PM   Result Value Ref Range    Ventricular Rate 82 BPM    Atrial Rate 82 BPM    P-R Interval 168 ms    QRS Duration 124 ms    Q-T Interval 372 ms    QTc Calculation (Bazett) 434 ms    P Axis 78 degrees    R Axis 93 degrees    T Axis 78 degrees   COVID-19 & Influenza Combo    Collection Time: 03/02/23  1:35 PM    Specimen: Nasopharyngeal Swab   Result Value Ref Range    SARS-CoV-2 RNA, RT PCR NOT DETECTED NOT DETECTED    INFLUENZA A NOT DETECTED NOT DETECTED    INFLUENZA B NOT DETECTED NOT DETECTED   Urine with Reflexed Micro    Collection Time: 03/02/23  2:50 PM   Result Value Ref Range    Glucose, Ur NEGATIVE NEGATIVE mg/dl    Bilirubin Urine NEGATIVE NEGATIVE    Ketones, Urine TRACE (A) NEGATIVE    Specific Gravity, Urine 1.013 1.002 - 1.030    Blood, Urine NEGATIVE NEGATIVE    pH, UA 5.5 5.0 - 9.0    Protein, UA 30 (A) NEGATIVE    Urobilinogen, Urine 0.2 0.0 - 1.0 eu/dl    Nitrite, Urine NEGATIVE NEGATIVE    Leukocyte Esterase, Urine NEGATIVE NEGATIVE    Color, UA YELLOW STRAW-YELLOW    Character, Urine CLEAR CLEAR-SL CLOUD    RBC, UA 0-2 0-2/hpf /hpf    WBC, UA 2-4 0-4/hpf /hpf    Epithelial Cells, UA 0-2 3-5/hpf /hpf    Bacteria, UA NONE SEEN FEW/NONE SEEN /hpf    Casts UA NONE SEEN NONE SEEN /lpf    Crystals, UA NONE SEEN NONE SEEN    Renal Epithelial, UA NONE SEEN NONE SEEN    Yeast, UA NONE SEEN NONE SEEN    CASTS 2 NONE SEEN NONE SEEN /lpf    MISCELLANEOUS 2 NONE SEEN    BMP    Collection Time: 03/02/23  2:55 PM   Result Value Ref Range    Sodium 141 135 - 145 meq/L    Potassium 5.2 3.5 - 5.2 meq/L    Chloride 101 98 - 111 meq/L    CO2 24 23 - 33 meq/L    Glucose 46 (L) 70 - 108 mg/dL    BUN 48 (H) 7 - 22 mg/dL    Creatinine 8.2 (HH) 0.4 - 1.2 mg/dL    Calcium 14.2 (HH) 8.5 - 10.5 mg/dL   Hepatic Function Panel    Collection Time: 03/02/23  2:55 PM   Result Value Ref Range    Albumin 4.2 3.5 - 5.1 g/dL    Total Bilirubin 0.3 0.3 - 1.2 mg/dL    Bilirubin, Direct <0.2 0.0 - 0.3 mg/dL    Alkaline Phosphatase 90 38 - 126 U/L    AST 16 5 - 40 U/L    ALT 8 (L) 11 - 66 U/L    Total Protein 6.3 6.1 - 8.0 g/dL   Lipase    Collection Time: 03/02/23  2:55 PM   Result Value Ref Range    Lipase 20.1 5.6 - 51.3 U/L   Magnesium    Collection Time: 03/02/23  2:55 PM   Result Value Ref Range    Magnesium 2.5 (H) 1.6 - 2.4 mg/dL   Troponin    Collection Time: 03/02/23  2:55 PM   Result Value Ref Range    Troponin T 0.028 (A) ng/ml   Brain Natriuretic Peptide    Collection Time: 03/02/23  2:55 PM   Result Value Ref Range    Pro-BNP 3563.0 (H) 0.0 - 124.0 pg/mL   TSH with Reflex    Collection Time: 03/02/23  2:55 PM   Result Value Ref Range    TSH 2.360 0.400 - 4.200 uIU/mL   Anion Gap    Collection Time: 03/02/23  2:55 PM   Result Value Ref Range    Anion Gap 16.0 8.0 - 16.0 meq/L   Glomerular Filtration Rate, Estimated    Collection Time: 03/02/23  2:55 PM   Result Value Ref Range    Est, Glom Filt Rate 5 (A) >60 ml/min/1.73m2   Osmolality    Collection Time: 03/02/23  2:55 PM   Result Value Ref Range    Osmolality Calc 291.0 275.0 - 300.0 mOsmol/kg   Hepatitis B Surface Antigen    Collection Time: 03/02/23  2:55 PM   Result Value Ref Range    Hepatitis B Surface Ag Negative    Hepatitis B Surface Antibody    Collection Time: 03/02/23  2:55 PM   Result Value Ref Range    Hep B S Ab Negative    POCT Glucose    Collection Time: 03/02/23  4:09 PM   Result Value Ref Range    POC Glucose 45 (L) 70 - 108 mg/dl   CBC with Auto Differential    Collection Time: 03/02/23  4:14 PM   Result Value Ref Range    WBC 5.8 4.8 - 10.8 thou/mm3    RBC 3.96 (L) 4.20 - 5.40 mill/mm3    Hemoglobin 12.2 12.0 - 16.0 gm/dl    Hematocrit 40.6 37.0 - 47.0 %    .5 (H) 81.0 - 99.0 fL    MCH 30.8 26.0 - 33.0 pg    MCHC 30.0 (L) 32.2 - 35.5 gm/dl    RDW-CV 14.7 (H) 11.5 - 14.5 %    RDW-SD 54.6 (H) 35.0 - 45.0 fL    Platelets 321 771 - 435 thou/mm3    MPV 10.0 9.4 - 12.4 fL    Seg Neutrophils 79.1 %    Lymphocytes 14.5 %    Monocytes 4.5 %    Eosinophils 0.0 %    Basophils 1.0 %    Immature Granulocytes 0.9 %    Segs Absolute 4.6 1.8 - 7.7 thou/mm3    Lymphocytes Absolute 0.8 (L) 1.0 - 4.8 thou/mm3    Monocytes Absolute 0.3 (L) 0.4 - 1.3 thou/mm3    Eosinophils Absolute 0.0 0.0 - 0.4 thou/mm3    Basophils Absolute 0.1 0.0 - 0.1 thou/mm3    Immature Grans (Abs) 0.05 0.00 - 0.07 thou/mm3    nRBC 0 /100 wbc   Calcium, Ionized    Collection Time: 03/02/23  4:14 PM   Result Value Ref Range    Calcium, Ionized 1.70 (HH) 1.12 - 1.32 mmol/L   POCT Glucose    Collection Time: 03/02/23  4:33 PM   Result Value Ref Range    POC Glucose 94 70 - 108 mg/dl   POCT Glucose    Collection Time: 03/02/23  5:03 PM   Result Value Ref Range    POC Glucose 120 (H) 70 - 108 mg/dl   POCT Glucose    Collection Time: 03/02/23  5:23 PM   Result Value Ref Range    POC Glucose 171 (H) 70 - 108 mg/dl   Troponin    Collection Time: 03/02/23  9:49 PM   Result Value Ref Range    Troponin T 0.023 (A) ng/ml   POCT Glucose    Collection Time: 03/02/23 10:07 PM   Result Value Ref Range    POC Glucose 164 (H) 70 - 108 mg/dl   Troponin    Collection Time: 03/02/23 11:46 PM   Result Value Ref Range    Troponin T 0.021 (A) ng/ml   Lactate, Sepsis    Collection Time: 03/02/23 11:46 PM   Result Value Ref Range    Lactic Acid, Sepsis 1.7 0.5 - 1.9 mmol/L   CBC with Auto Differential    Collection Time: 03/02/23 11:46 PM   Result Value Ref Range    WBC 8.0 4.8 - 10.8 thou/mm3    RBC 3.47 (L) 4.20 - 5.40 mill/mm3    Hemoglobin 10.6 (L) 12.0 - 16.0 gm/dl    Hematocrit 36.0 (L) 37.0 - 47.0 %    .7 (H) 81.0 - 99.0 fL    MCH 30.5 26.0 - 33.0 pg    MCHC 29.4 (L) 32.2 - 35.5 gm/dl    RDW-CV 14.4 11.5 - 14.5 %    RDW-SD 54.5 (H) 35.0 - 45.0 fL    Platelets 985 518 - 754 thou/mm3    MPV 10.4 9.4 - 12.4 fL    Seg Neutrophils 63.4 %    Lymphocytes 23.1 %    Monocytes 10.3 %    Eosinophils 2.1 %    Basophils 0.5 %    Immature Granulocytes 0.6 %    Segs Absolute 5.1 1.8 - 7.7 thou/mm3    Lymphocytes Absolute 1.8 1.0 - 4.8 thou/mm3    Monocytes Absolute 0.8 0.4 - 1.3 thou/mm3    Eosinophils Absolute 0.2 0.0 - 0.4 thou/mm3    Basophils Absolute 0.0 0.0 - 0.1 thou/mm3    Immature Grans (Abs) 0.05 0.00 - 0.07 thou/mm3    nRBC 0 /100 wbc   Culture, Blood 1    Collection Time: 03/02/23 11:46 PM    Specimen: Blood   Result Value Ref Range    Blood Culture, Routine No growth 24 hours. Culture, Blood 2    Collection Time: 03/02/23 11:48 PM    Specimen: Blood   Result Value Ref Range    Blood Culture, Routine No growth 24 hours.     POCT Glucose    Collection Time: 03/03/23 12:11 AM   Result Value Ref Range    POC Glucose 80 70 - 108 mg/dl   POCT Glucose    Collection Time: 03/03/23  2:05 AM   Result Value Ref Range    POC Glucose 113 (H) 70 - 108 mg/dl   POCT Glucose    Collection Time: 03/03/23  4:37 AM   Result Value Ref Range    POC Glucose 64 (L) 70 - 108 mg/dl   POCT Glucose    Collection Time: 03/03/23  5:03 AM   Result Value Ref Range    POC Glucose 95 70 - 108 mg/dl   CBC with Auto Differential    Collection Time: 03/03/23  5:05 AM   Result Value Ref Range    WBC 6.1 4.8 - 10.8 thou/mm3 RBC 3.54 (L) 4.20 - 5.40 mill/mm3    Hemoglobin 10.8 (L) 12.0 - 16.0 gm/dl    Hematocrit 36.3 (L) 37.0 - 47.0 %    .5 (H) 81.0 - 99.0 fL    MCH 30.5 26.0 - 33.0 pg    MCHC 29.8 (L) 32.2 - 35.5 gm/dl    RDW-CV 14.1 11.5 - 14.5 %    RDW-SD 52.9 (H) 35.0 - 45.0 fL    Platelets 470 822 - 766 thou/mm3    MPV 10.3 9.4 - 12.4 fL    Seg Neutrophils 57.9 %    Lymphocytes 30.2 %    Monocytes 8.1 %    Eosinophils 2.8 %    Basophils 0.5 %    Immature Granulocytes 0.5 %    Segs Absolute 3.5 1.8 - 7.7 thou/mm3    Lymphocytes Absolute 1.8 1.0 - 4.8 thou/mm3    Monocytes Absolute 0.5 0.4 - 1.3 thou/mm3    Eosinophils Absolute 0.2 0.0 - 0.4 thou/mm3    Basophils Absolute 0.0 0.0 - 0.1 thou/mm3    Immature Grans (Abs) 0.03 0.00 - 0.07 thou/mm3    nRBC 0 /100 wbc   PTH, Intact    Collection Time: 03/03/23  5:05 AM   Result Value Ref Range    Pth Intact 157.6 (H) 15.0 - 65.0 pg/mL   Phosphorus    Collection Time: 03/03/23  5:06 AM   Result Value Ref Range    Phosphorus 2.5 2.4 - 4.7 mg/dL   Comprehensive Metabolic Panel    Collection Time: 03/03/23  5:06 AM   Result Value Ref Range    Glucose 107 70 - 108 mg/dL    Creatinine 3.5 (HH) 0.4 - 1.2 mg/dL    BUN 16 7 - 22 mg/dL    Sodium 139 135 - 145 meq/L    Potassium 4.0 3.5 - 5.2 meq/L    Chloride 103 98 - 111 meq/L    CO2 26 23 - 33 meq/L    Calcium 9.0 8.5 - 10.5 mg/dL    AST 16 5 - 40 U/L    Alkaline Phosphatase 79 38 - 126 U/L    Total Protein 5.5 (L) 6.1 - 8.0 g/dL    Albumin 3.7 3.5 - 5.1 g/dL    Total Bilirubin 0.2 (L) 0.3 - 1.2 mg/dL    ALT 9 (L) 11 - 66 U/L   Calcium, Ionized    Collection Time: 03/03/23  5:06 AM   Result Value Ref Range    Calcium, Ionized 1.22 1.12 - 1.32 mmol/L   Lactate, Sepsis    Collection Time: 03/03/23  5:06 AM   Result Value Ref Range    Lactic Acid, Sepsis 0.9 0.5 - 1.9 mmol/L   Anion Gap    Collection Time: 03/03/23  5:06 AM   Result Value Ref Range    Anion Gap 10.0 8.0 - 16.0 meq/L   Glomerular Filtration Rate, Estimated    Collection Time: 03/03/23  5:06 AM   Result Value Ref Range    Est, Glom Filt Rate 14 (A) >60 ml/min/1.73m2   Osmolality    Collection Time: 03/03/23  5:06 AM   Result Value Ref Range    Osmolality Calc 279.2 275.0 - 300.0 mOsmol/kg   POCT Glucose    Collection Time: 03/03/23  5:58 AM   Result Value Ref Range    POC Glucose 153 (H) 70 - 108 mg/dl   POCT Glucose    Collection Time: 03/03/23 11:00 AM   Result Value Ref Range    POC Glucose 85 70 - 108 mg/dl   POCT Glucose    Collection Time: 03/03/23  4:09 PM   Result Value Ref Range    POC Glucose 65 (L) 70 - 108 mg/dl   POCT Glucose    Collection Time: 03/03/23  4:58 PM   Result Value Ref Range    POC Glucose 140 (H) 70 - 108 mg/dl   POCT Glucose    Collection Time: 03/03/23  9:07 PM   Result Value Ref Range    POC Glucose 70 70 - 108 mg/dl   POCT Glucose    Collection Time: 03/03/23 10:17 PM   Result Value Ref Range    POC Glucose 151 (H) 70 - 108 mg/dl   POCT Glucose    Collection Time: 03/04/23  5:32 AM   Result Value Ref Range    POC Glucose 88 70 - 108 mg/dl   Basic Metabolic Panel w/ Reflex to MG    Collection Time: 03/04/23  6:10 AM   Result Value Ref Range    Sodium 143 135 - 145 meq/L    Potassium reflex Magnesium 4.6 3.5 - 5.2 meq/L    Chloride 111 98 - 111 meq/L    CO2 22 (L) 23 - 33 meq/L    Glucose 93 70 - 108 mg/dL    BUN 7 7 - 22 mg/dL    Creatinine 2.5 (H) 0.4 - 1.2 mg/dL    Calcium 7.6 (L) 8.5 - 10.5 mg/dL   CBC with Auto Differential    Collection Time: 03/04/23  6:10 AM   Result Value Ref Range    WBC 6.3 4.8 - 10.8 thou/mm3    RBC 3.58 (L) 4.20 - 5.40 mill/mm3    Hemoglobin 11.1 (L) 12.0 - 16.0 gm/dl    Hematocrit 40.6 37.0 - 47.0 %    .4 (H) 81.0 - 99.0 fL    MCH 31.0 26.0 - 33.0 pg    MCHC 27.3 (L) 32.2 - 35.5 gm/dl    RDW-CV 14.4 11.5 - 14.5 %    RDW-SD 61.0 (H) 35.0 - 45.0 fL    Platelets 898 306 - 838 thou/mm3    MPV 10.1 9.4 - 12.4 fL    Seg Neutrophils 59.8 %    Lymphocytes 27.5 %    Monocytes 9.8 %    Eosinophils 1.3 %    Basophils 0.8 % Immature Granulocytes 0.8 %    Segs Absolute 3.8 1.8 - 7.7 thou/mm3    Lymphocytes Absolute 1.7 1.0 - 4.8 thou/mm3    Monocytes Absolute 0.6 0.4 - 1.3 thou/mm3    Eosinophils Absolute 0.1 0.0 - 0.4 thou/mm3    Basophils Absolute 0.1 0.0 - 0.1 thou/mm3    Immature Grans (Abs) 0.05 0.00 - 0.07 thou/mm3    nRBC 0 /100 wbc    Anisocytosis Present Absent    Elliptocytes 1+ Absent    Hypochromia PRESENT Absent    Macrocytes PRESENT Absent    Poikilocytes 1+ Absent   Anion Gap    Collection Time: 03/04/23  6:10 AM   Result Value Ref Range    Anion Gap 10.0 8.0 - 16.0 meq/L   Glomerular Filtration Rate, Estimated    Collection Time: 03/04/23  6:10 AM   Result Value Ref Range    Est, Glom Filt Rate 20 (A) >60 ml/min/1.73m2   Scan of Blood Smear    Collection Time: 03/04/23  6:10 AM   Result Value Ref Range    SCAN OF BLOOD SMEAR see below    POCT Glucose    Collection Time: 03/04/23  8:33 AM   Result Value Ref Range    POC Glucose 116 (H) 70 - 108 mg/dl   POCT Glucose    Collection Time: 03/04/23 11:39 AM   Result Value Ref Range    POC Glucose 94 70 - 108 mg/dl        Microbiology:    Blood culture #1:   Lab Results   Component Value Date/Time    BC No growth 24 hours. 03/02/2023 11:48 PM       Blood culture #2:No results found for:  Lavender    Organism:  No results found for: LABGRAM    MRSA culture only:No results found for: [facility]    Urine culture:   Lab Results   Component Value Date/Time    LABURIN  12/17/2022 11:26 PM     No growth-preliminary Growth of Contaminants. The mixture of organisms present are not a common cause of urinary tract infections and probably represent skin baudilio or distal urethral baudilio.     LABURIN 200 MG/dl 01/15/2019 10:18 AM     Lab Results   Component Value Date/Time    ORG Mixed Growth 02/02/2023 04:00 PM        Respiratory culture: No results found for: CULTRESP    Aerobic and Anaerobic :  No results found for: LABAERO  No results found for: LABANAE    Urinalysis:      Lab Results Component Value Date/Time    NITRU NEGATIVE 03/02/2023 02:50 PM    WBCUA 2-4 03/02/2023 02:50 PM    BACTERIA NONE SEEN 03/02/2023 02:50 PM    RBCUA 0-2 03/02/2023 02:50 PM    BLOODU NEGATIVE 03/02/2023 02:50 PM    SPECGRAV 1.009 12/29/2022 03:30 PM    GLUCOSEU NEGATIVE 03/02/2023 02:50 PM       Radiology:-  XR CHEST (2 VW)    Result Date: 3/2/2023  PROCEDURE: XR CHEST (2 VW) CLINICAL INFORMATION: 20-year-old female with fatigue. Weakness. COMPARISON: Radiographs 12/16/2022. TECHNIQUE: PA and lateral views of the chest were obtained. FINDINGS: Surgical hardware is present in the right humerus. There is fusion hardware in the cervical spine. A hemodialysis catheter is present with right IJ access. The lungs are clear. The cardiac silhouette and pulmonary vasculature are within normal limits. There is no significant pleural effusion or pneumothorax. Visualized portions of the upper abdomen are within normal limits. The osseous structures are intact. No acute fractures or suspicious osseous lesions. There is no acute intrathoracic process. **This report has been created using voice recognition software. It may contain minor errors which are inherent in voice recognition technology. ** Final report electronically signed by Dr Galen Marte on 3/2/2023 2:11 PM       Follow-up scheduled after discharge :-    in the next week with Raul Best MD  in the next few days with Dr. Ning Beltran    Consultations during this hospital stay:-  [] NONE [] Cardiology  [x] Nephrology  [] Hemo onco   [] GI   [] ID  [] Endocrine  [] Pulm    [] Neuro    [] Psych   [] Urology  [] ENT   [] G SURGERY   []Ortho    []CV surg    [] Palliative  [] Hospice [] Pain management   []    []TCU   [] PT/OT  OTHERS:-    Disposition: home  Condition at Discharge: Stable    Time Spent:- 40 minutes    Electronically signed by Stefani Dinero on 3/4/23 at 12:09 PM EST   Electronically signed by DENISSE Oliveira on 3/4/2023 at 2:18 PM    Discharging Hospitalist

## 2023-03-04 NOTE — PROGRESS NOTES
[facility]   Nephrology progress note  3/4/2023, 9:59 AM      Pt Name:    Jaspal Sherwood  MRN:     [id number]     YOB: 1955  Admit Date:    3/2/2023 12:59 PM    Chief Complaint: Nephrology following for ERUM needing hemodialysis and hypercalcemia.     Subjective:  Patient seen and examined  No chest pain or shortness of breath  Patient is looking so much better mental status much improved  Currently  at bedside      Objective:  24HR INTAKE/OUTPUT:    Intake/Output Summary (Last 24 hours) at 3/4/2023 0959  Last data filed at 3/4/2023 4509  Gross per 24 hour   Intake 825 ml   Output 400 ml   Net 425 ml        Admission weight: 105 lb (47.6 kg)  Wt Readings from Last 3 Encounters:   03/03/23 105 lb 3.2 oz (47.7 kg)   02/01/23 101 lb (45.8 kg)   01/10/23 108 lb (49 kg)        Vitals :   Vitals:    03/03/23 2100 03/04/23 0000 03/04/23 0415 03/04/23 0907   BP: (!) 129/90 136/68 (!) 145/68 (!) 159/68   Pulse: 79 79 93 89   Resp: 17 17 19 16   Temp: 98 °F (36.7 °C) 97.9 °F (36.6 °C) 98.2 °F (36.8 °C) 98.2 °F (36.8 °C)   TempSrc: Oral Oral Oral Oral   SpO2: 97% 97% 97% 97%   Weight:       Height:           Physical examination  General Appearance: Cachectic and ill-appearing  Mouth/Throat:  Oral mucosa moist  Neck:  Supple, no JVD  Lungs:  Breath sounds: clear  Heart[de-identified]  S1,S2 heard  Abdomen:  Soft, non - tender  Musculoskeletal:  Edema - none    Medications:  Infusion:    dextrose      sodium chloride      sodium chloride 60 mL/hr at 03/04/23 0902     Meds:    buPROPion  100 mg Oral Daily    aspirin  81 mg Oral Daily    clopidogrel  75 mg Oral Daily    folic acid  808 mcg Oral Daily    montelukast  10 mg Oral Daily    atorvastatin  10 mg Oral Daily    sodium chloride flush  5-40 mL IntraVENous 2 times per day    heparin (porcine)  5,000 Units SubCUTAneous BID       Lab Data :  CBC:   Recent Labs     03/02/23  2346 03/03/23  0505 03/04/23  0610   WBC 8.0 6.1 6.3   HGB 10.6* 10.8* 11.1* HCT 36.0* 36.3* 40.6    215 175       CMP:  Recent Labs     03/02/23  1455 03/03/23  0506 03/04/23  0610    139 143   K 5.2 4.0 4.6    103 111   CO2 24 26 22*   BUN 48* 16 7   CREATININE 8.2* 3.5* 2.5*   GLUCOSE 46* 107 93   CALCIUM 14.2* 9.0 7.6*   MG 2.5*  --   --    PHOS  --  2.5  --        Hepatic:   Recent Labs     03/02/23  1455 03/03/23  0506   LABALBU 4.2 3.7   AST 16 16   ALT 8* 9*   BILITOT 0.3 0.2*   ALKPHOS 90 79         Assessment and Plan:  Renal -acute kidney injury secondary to ATN needing hemodialysis so far no signs of renal recovery  Due to significantly elevated hypercalcemia in the setting of renal failure d has dialysis done 2 days in a row  Volume status stable no acute need for dialysis today  Electrolytes -within normal limits potassium   Essential hypertension well continue current medications  Hypercalcemia most likely due to combination of iatrogenic and possibly due to volume depletion from poor oral intake status post HD on a 2 calcium bath looking much better. Calcitriol and calcium supplements upon discharge. We will stop the IV fluids today and monitor  Anemia renal dysfunction stable no need of ALEJANDRA  Meds reviewed and discussed with patient and     Derick Alexander MD  [facility]    This report has been created using voice recognition software.  It may contain minor errors which are inherent in voice recognition technology

## 2023-03-04 NOTE — PLAN OF CARE
Problem: Discharge Planning  Goal: Discharge to home or other facility with appropriate resources  3/4/2023 1427 by Maryanne Beatty RN  Outcome: Adequate for Discharge  3/4/2023 1011 by Maryanne Beatty RN  Outcome: Progressing  Flowsheets  Taken 3/4/2023 0905 by Maryanne Beatty RN  Discharge to home or other facility with appropriate resources: Identify barriers to discharge with patient and caregiver  Taken 3/4/2023 0000 by Rayray Perez RN  Discharge to home or other facility with appropriate resources: Identify barriers to discharge with patient and caregiver     Problem: Pain  Goal: Verbalizes/displays adequate comfort level or baseline comfort level  3/4/2023 1427 by Maryanne Beatty RN  Outcome: Adequate for Discharge  3/4/2023 1011 by Maryanne Beatty RN  Outcome: Progressing     Problem: Skin/Tissue Integrity  Goal: Absence of new skin breakdown  Description: 1. Monitor for areas of redness and/or skin breakdown  2. Assess vascular access sites hourly  3. Every 4-6 hours minimum:  Change oxygen saturation probe site  4. Every 4-6 hours:  If on nasal continuous positive airway pressure, respiratory therapy assess nares and determine need for appliance change or resting period.   3/4/2023 1427 by Maryanne Beatty RN  Outcome: Adequate for Discharge  3/4/2023 1011 by Maryanne Beatty RN  Outcome: Progressing     Problem: Safety - Adult  Goal: Free from fall injury  3/4/2023 1427 by Maryanne Beatty RN  Outcome: Adequate for Discharge  3/4/2023 1011 by Maryanne Beatty RN  Outcome: Progressing  Flowsheets  Taken 3/4/2023 0959  Free From Fall Injury: Anjum Colbert family/caregiver on patient safety  Taken 3/4/2023 0905  Free From Fall Injury: Instruct family/caregiver on patient safety     Problem: ABCDS Injury Assessment  Goal: Absence of physical injury  3/4/2023 1427 by Maryanne Beatty RN  Outcome: Adequate for Discharge  3/4/2023 1011 by Maryanne Beatty RN  Outcome: Progressing  Flowsheets  Taken 3/4/2023 3403  Absence of Physical Injury: Implement safety measures based on patient assessment  Taken 3/4/2023 0905  Absence of Physical Injury: Implement safety measures based on patient assessment     Problem: Metabolic/Fluid and Electrolytes - Adult  Goal: Electrolytes maintained within normal limits  3/4/2023 1427 by Evette Riedel, RN  Outcome: Adequate for Discharge  3/4/2023 1011 by Evette Riedel, RN  Outcome: Progressing  Flowsheets  Taken 3/4/2023 0905 by Evette Riedel, RN  Electrolytes maintained within normal limits: Monitor labs and assess patient for signs and symptoms of electrolyte imbalances  Taken 3/4/2023 0000 by Lorri Lepe RN  Electrolytes maintained within normal limits: Monitor labs and assess patient for signs and symptoms of electrolyte imbalances     Problem: Chronic Conditions and Co-morbidities  Goal: Patient's chronic conditions and co-morbidity symptoms are monitored and maintained or improved  3/4/2023 1427 by Evette Riedel, RN  Outcome: Adequate for Discharge  3/4/2023 1011 by Evette Riedel, RN  Outcome: Progressing  Flowsheets (Taken 3/4/2023 0905)  Care Plan - Patient's Chronic Conditions and Co-Morbidity Symptoms are Monitored and Maintained or Improved: Monitor and assess patient's chronic conditions and comorbid symptoms for stability, deterioration, or improvement     Problem: Nutrition Deficit:  Goal: Optimize nutritional status  3/4/2023 1427 by Evette Riedel, RN  Outcome: Adequate for Discharge  3/4/2023 1011 by Evette Riedel, RN  Outcome: Progressing

## 2023-03-04 NOTE — FLOWSHEET NOTE
03/04/23 1404   Safe Environment   Safety Measures Other (comment)  (vn reviewed AVS with pt and spouse all meds , next dose due and last dose given reviewed , verbal understanding of follow up appts and labs , message sent via Nextnav to bedside RN)   Pt responds to audio , permits video , self and role identified , pt resting in bed in position of comfort , call light visible within reach , no voiced concerns or complaints .

## 2023-03-05 LAB
BACTERIA BLD AEROBE CULT: NORMAL
BACTERIA BLD AEROBE CULT: NORMAL

## 2023-03-06 ENCOUNTER — TELEPHONE (OUTPATIENT)
Dept: FAMILY MEDICINE CLINIC | Age: 68
End: 2023-03-06

## 2023-03-06 DIAGNOSIS — R35.0 URINARY FREQUENCY: Primary | ICD-10-CM

## 2023-03-06 NOTE — TELEPHONE ENCOUNTER
Patients  stating patient thinks she is having a UTI again. Frequency for the last few days. Orders placed for UA at [facility]. No concerns voiced.

## 2023-03-06 NOTE — TELEPHONE ENCOUNTER
Care Transitions Initial Follow Up Call    Outreach made within 2 business days of discharge: Yes    Patient: Law Bain Patient : 1955   MRN: [id number]  Reason for Admission: There are no discharge diagnoses documented for the most recent discharge. Discharge Date: 3/4/23       Spoke with:     Discharge department/facility: [facility] [place]    TCM Interactive Patient Contact:  Was patient able to fill all prescriptions: Yes  Was patient instructed to bring all medications to the follow-up visit: Yes  Is patient taking all medications as directed in the discharge summary?  Yes  Does patient understand their discharge instructions: Yes  Does patient have questions or concerns that need addressed prior to 7-14 day follow up office visit: no    Scheduled appointment with PCP within 7-14 days    Follow Up  Future Appointments   Date Time Provider Jaya Chapman   3/8/2023 12:00 PM Sabino Pal, PhD N [id number] [address] [place] [address]   3/9/2023  8:45 AM MD Lee Ayala [facility] Jelani Esparza   4/10/2023 10:30 AM MD Lee Ayala [facility] Elina   10/30/2023  1:15 PM Brenda Staples MD [facility] [place], LPN

## 2023-03-07 ENCOUNTER — CLINICAL DOCUMENTATION ONLY (OUTPATIENT)
Facility: CLINIC | Age: 68
End: 2023-03-07

## 2023-03-08 LAB
BACTERIA BLD AEROBE CULT: NORMAL
BACTERIA BLD AEROBE CULT: NORMAL

## 2023-03-08 NOTE — PROGRESS NOTES
[address]  [address]  Phone:  [phone]     Post-Discharge Transitional Care  Follow Up      Jai Mueller   YOB: 1955    Date of Office Visit:  3/9/2023  Date of Hospital Admission: 3/2/23  Date of Hospital Discharge: 3/4/23  Risk of hospital readmission (high >=14%. Medium >=10%) :Readmission Risk Score: 17    Care management risk score Rising risk (score 2-5) and Complex Care (Scores >=6): No Risk Score On File     Non face to face  following discharge, date last encounter closed (first attempt may have been earlier): 03/06/2023    Call initiated 2 business days of discharge: Yes    Inpatient course: Discharge summary reviewed- see chart. Interval history/Current status: See below. ASSESSMENT/PLAN:     Hospital discharge follow-up  [facility] records, labs, and imaging were reviewed and are summarized below. -     [facility] DISCHARGE MEDS RECONCILED W/ CURRENT OUTPATIENT MED LIST    ERUM (acute kidney injury) ([facility]  Stage 4 chronic kidney disease ([facility]        -     Her creatinine on admission was 8.2. She underwent HD on 3/2 and 3/3 and will be going Mondays and Fridays. They're considering home dialysis. She'll have a BMP in the next week then f/u with nephrology. Oral hydration was encouraged. Hypercalcemia       -    This was felt to be secondary to ERUM and improved with HD. Encourage oral hydration. Hypoglycemia        -     Her BG on admission was only 46 so she was treated with glucagon and it improved. Oral hydration and nutrition encouraged. Chronic diastolic CHF (congestive heart failure) (HCC)        -     She has grade 1 diastolic dysfunction with no signs of fluid overload so will continue to monitor. Medical Decision Making: high complexity    Return in about 3 months (around 6/9/2023) for CKD, CHF, Medicare AWV. Subjective:      Sophia Garcia is a 68 yo female who presents today with her  Sky Calix for transition of care. She was hospitalized at [facility] from 3/2/23-3/4/23. Hospital records, labs, and imaging were reviewed and are summarized below. She was taken to the ER with nausea, fatigue, and generalized weakness. In the ER she was hypertensive and hypoglycemic. Her creatinine was 8.2. She was hospitalized for further medical management. She had dialysis on 3/2 and 3/3. Oral hydration was encouraged. She is going to be doing HD M/W/F. She will have a BMP next week and f/u with nephrology. Her hyperkalemia was thought to be from ERUM. Since being in the hospital, her  feels like a switch was flipped and she's a different person. She's dressing herself, is eating well, and is able to go out to eat.           Patient Active Problem List   Diagnosis    Cerebral infarction ([address]    Hx of Chest pain, atypical- tenderness on the left lower chest wall    Anxiety disorder    History of CVA (cerebrovascular accident)    Hyperlipidemia    Irritable bowel syndrome    Abdominal adhesions    Allergic rhinitis    Essential hypertension    Generalized anxiety disorder    Stage 4 chronic kidney disease (HCC)    Age related osteoporosis    Major depression, recurrent ([address]    Environmental and seasonal allergies    Hearing loss of right ear due to cerumen impaction    Recurrent sinusitis    Abnormal EKG    Postural dizziness    SOB (shortness of breath) on exertion    Lumbosacral radiculopathy    Degeneration of lumbar intervertebral disc    Acute renal failure (HCC)    Metabolic acidosis    Hypokalemia    Hypomagnesemia    Hyperphosphatemia    Hypocalcemia    Hemoptysis    ERUM (acute kidney injury) ([address]    Nosebleed    Acute on chronic anemia    Hypernatremia    Abdominal pain    Nausea and vomiting    Upper respiratory tract infection    Chronic diastolic CHF (congestive heart failure) (HCC)    Depression    Generalized weakness    ATN (acute tubular necrosis) (HCC)    Thrombocytopenia ([address] Hypercalcemia    ARF (acute renal failure) with tubular necrosis (HCC)    Moderate malnutrition ([address]       Medications listed as ordered at the time of discharge from hospital     Medication List            Accurate as of March 9, 2023  9:04 AM. If you have any questions, ask your nurse or doctor.                 CONTINUE taking these medications      aspirin 81 MG tablet     buPROPion 200 MG extended release tablet  Commonly known as: WELLBUTRIN SR  Take 1 tablet by mouth 2 times daily     calcitRIOL 0.25 MCG capsule  Commonly known as: ROCALTROL  Take 1 capsule by mouth four times a week     cetirizine 5 MG tablet  Commonly known as: ZYRTEC  Take 1 tablet by mouth nightly as needed for Allergies     chlordiazePOXIDE-clidinium 5-2.5 MG per capsule  Commonly known as: LIBRAX  TAKE 1 CAPSULE TWICE A DAY AS NEEDED FOR PAIN     clopidogrel 75 MG tablet  Commonly known as: PLAVIX  Take 1 tablet by mouth daily     docusate sodium 100 MG capsule  Commonly known as: COLACE     folic acid 282 MCG tablet  Commonly known as: FOLVITE     glucose monitoring kit  1 kit by Does not apply route daily     montelukast 10 MG tablet  Commonly known as: SINGULAIR  Take 1 tablet by mouth daily     ondansetron 4 MG disintegrating tablet  Commonly known as: ZOFRAN-ODT  Take 1 tablet by mouth 3 times daily as needed for Nausea or Vomiting     simvastatin 20 MG tablet  Commonly known as: ZOCOR  TAKE 1 TABLET BY MOUTH DAILY     traZODone 50 MG tablet  Commonly known as: DESYREL  TAKE 1 TABLET NIGHTLY     triamcinolone 55 MCG/ACT nasal inhaler  Commonly known as: NASACORT               Where to Get Your Medications        These medications were sent to [address] [place] [address] [address] - [place], [address] [facility] [phone]  [address], [place] [address]      Phone: [phone]   montelukast 10 MG tablet           Medications marked \"taking\" at this time  Outpatient Medications Marked as Taking for the 3/9/23 encounter (Office Visit) with Saranya Mcfadden MD   Medication Sig Dispense Refill    montelukast (SINGULAIR) 10 MG tablet Take 1 tablet by mouth daily 90 tablet 3    glucose monitoring (FREESTYLE FREEDOM) kit 1 kit by Does not apply route daily 1 kit 0    ondansetron (ZOFRAN-ODT) 4 MG disintegrating tablet Take 1 tablet by mouth 3 times daily as needed for Nausea or Vomiting 21 tablet 0    clopidogrel (PLAVIX) 75 MG tablet Take 1 tablet by mouth daily 90 tablet 1    cetirizine (ZYRTEC) 5 MG tablet Take 1 tablet by mouth nightly as needed for Allergies 30 tablet 0    traZODone (DESYREL) 50 MG tablet TAKE 1 TABLET NIGHTLY 90 tablet 3    docusate sodium (COLACE) 100 MG capsule Take 100 mg by mouth daily as needed for Constipation      buPROPion (WELLBUTRIN SR) 200 MG extended release tablet Take 1 tablet by mouth 2 times daily 180 tablet 3    simvastatin (ZOCOR) 20 MG tablet TAKE 1 TABLET BY MOUTH DAILY 90 tablet 3    chlordiazePOXIDE-clidinium (LIBRAX) 5-2.5 MG per capsule TAKE 1 CAPSULE TWICE A DAY AS NEEDED FOR PAIN 180 capsule 3    triamcinolone (NASACORT) 55 MCG/ACT nasal inhaler 2 sprays by Each Nostril route daily As needed      folic acid (FOLVITE) 304 MCG tablet Take 400 mcg by mouth daily      aspirin 81 MG tablet Take 81 mg by mouth daily. Medications patient taking as of now reconciled against medications ordered at time of hospital discharge: Yes    A comprehensive review of systems was negative except for what was noted in the HPI.     Objective:    /84 (Site: Left Upper Arm, Position: Standing, Cuff Size: Medium Adult)   Pulse 89   Temp 98.2 °F (36.8 °C) (Temporal)   Resp 17   Ht 5' (1.524 m)   Wt 107 lb (48.5 kg)   SpO2 95%   BMI 20.90 kg/m²     General Appearance: alert and oriented to person, place and time, well developed and well- nourished, in no acute distress  Skin: warm and dry, no rash or erythema  Head: normocephalic and atraumatic  Eyes: extraocular eye movements intact, conjunctivae normal  ENT: tympanic membrane, external ear and ear canal normal bilaterally, nose without deformity, nasal mucosa and turbinates normal without polyps  Neck: supple and non-tender without mass, no thyromegaly or thyroid nodules, no cervical lymphadenopathy  Pulmonary/Chest: clear to auscultation bilaterally- no wheezes, rales or rhonchi, normal air movement, no respiratory distress  Cardiovascular: normal rate, regular rhythm, normal S1 and S2, no murmurs, rubs, clicks, or gallops, distal pulses intact, no carotid bruits  Abdomen: soft, non-tender, non-distended, normal bowel sounds, no masses or organomegaly      An electronic signature was used to authenticate this note.  --Johana Weldon MD

## 2023-03-09 ENCOUNTER — TELEPHONE (OUTPATIENT)
Dept: FAMILY MEDICINE CLINIC | Age: 68
End: 2023-03-09

## 2023-03-09 ENCOUNTER — NURSE ONLY (OUTPATIENT)
Dept: LAB | Age: 68
End: 2023-03-09

## 2023-03-09 ENCOUNTER — OFFICE VISIT (OUTPATIENT)
Dept: FAMILY MEDICINE CLINIC | Age: 68
End: 2023-03-09

## 2023-03-09 VITALS
HEART RATE: 89 BPM | HEIGHT: 60 IN | OXYGEN SATURATION: 95 % | WEIGHT: 107 LBS | DIASTOLIC BLOOD PRESSURE: 84 MMHG | RESPIRATION RATE: 17 BRPM | TEMPERATURE: 98.2 F | BODY MASS INDEX: 21.01 KG/M2 | SYSTOLIC BLOOD PRESSURE: 118 MMHG

## 2023-03-09 DIAGNOSIS — N17.9 AKI (ACUTE KIDNEY INJURY) (HCC): ICD-10-CM

## 2023-03-09 DIAGNOSIS — E83.51 HYPOCALCEMIA: ICD-10-CM

## 2023-03-09 DIAGNOSIS — E16.2 HYPOGLYCEMIA: Primary | ICD-10-CM

## 2023-03-09 DIAGNOSIS — J30.1 ALLERGIC RHINITIS DUE TO POLLEN, UNSPECIFIED SEASONALITY: ICD-10-CM

## 2023-03-09 DIAGNOSIS — E83.52 HYPERCALCEMIA: ICD-10-CM

## 2023-03-09 DIAGNOSIS — I50.32 CHRONIC DIASTOLIC CHF (CONGESTIVE HEART FAILURE) (HCC): ICD-10-CM

## 2023-03-09 DIAGNOSIS — N18.4 STAGE 4 CHRONIC KIDNEY DISEASE (HCC): ICD-10-CM

## 2023-03-09 DIAGNOSIS — E16.2 HYPOGLYCEMIA: ICD-10-CM

## 2023-03-09 DIAGNOSIS — Z09 HOSPITAL DISCHARGE FOLLOW-UP: Primary | ICD-10-CM

## 2023-03-09 LAB — CALCIUM SERPL-MCNC: 8.7 MG/DL (ref 8.5–10.5)

## 2023-03-09 RX ORDER — MONTELUKAST SODIUM 10 MG/1
10 TABLET ORAL DAILY
Qty: 90 TABLET | Refills: 3 | Status: SHIPPED | OUTPATIENT
Start: 2023-03-09

## 2023-03-09 RX ORDER — GLUCOSAMINE HCL/CHONDROITIN SU 500-400 MG
CAPSULE ORAL
Refills: 0 | Status: CANCELLED | OUTPATIENT
Start: 2023-03-09

## 2023-03-09 RX ORDER — BLOOD-GLUCOSE METER
1 KIT MISCELLANEOUS DAILY
Qty: 1 KIT | Refills: 0 | Status: SHIPPED | OUTPATIENT
Start: 2023-03-09

## 2023-03-09 RX ORDER — BLOOD-GLUCOSE METER
1 KIT MISCELLANEOUS DAILY
Qty: 1 KIT | Refills: 0 | Status: CANCELLED | OUTPATIENT
Start: 2023-03-09

## 2023-03-09 RX ORDER — GLUCOSAMINE HCL/CHONDROITIN SU 500-400 MG
CAPSULE ORAL
Qty: 100 STRIP | Refills: 0 | Status: SHIPPED | OUTPATIENT
Start: 2023-03-09

## 2023-03-09 RX ORDER — LANCETS 30 GAUGE
1 EACH MISCELLANEOUS 2 TIMES DAILY
Qty: 100 EACH | Refills: 0 | Status: SHIPPED | OUTPATIENT
Start: 2023-03-09

## 2023-03-09 NOTE — TELEPHONE ENCOUNTER
[facility] called stating rx for glucometer needs resent with diagnosis code.  Also asking if patient needs lancets and test strips.  If so will need a rx for both with diagnosis code.     Rx pending for glucometer, test strip, lancets with dx hypoglycemia.     Need to know how often patient is testing.

## 2023-03-10 LAB — CA-I SERPL ISE-SCNC: NORMAL MMOL/L

## 2023-04-01 LAB
EKG ATRIAL RATE: 82 BPM
EKG P AXIS: 78 DEGREES
EKG P-R INTERVAL: 168 MS
EKG Q-T INTERVAL: 372 MS
EKG QRS DURATION: 124 MS
EKG QTC CALCULATION (BAZETT): 434 MS
EKG R AXIS: 93 DEGREES
EKG T AXIS: 78 DEGREES
EKG VENTRICULAR RATE: 82 BPM

## 2023-04-04 ENCOUNTER — TELEPHONE (OUTPATIENT)
Dept: FAMILY MEDICINE CLINIC | Age: 68
End: 2023-04-04

## 2023-04-04 NOTE — TELEPHONE ENCOUNTER
----- Message from Teena Welsh sent at 4/4/2023  2:49 PM EDT -----  Subject: Message to Provider    QUESTIONS  Information for Provider? Patient is getting severe headaches after   dialysis. is there anything that she can take to help with this. Tylenol   is not working.   ---------------------------------------------------------------------------  --------------  [id number]  [phone]; OK to leave message on voicemail  ---------------------------------------------------------------------------  --------------  SCRIPT ANSWERS  Relationship to Patient? Spouse/Partner  Representative Name? Angeles Miranda  Is the representative on the Communication Release of Information (JARED)   form in Epic?  Yes

## 2023-04-04 NOTE — TELEPHONE ENCOUNTER
If she's only having headaches after dialysis, I'd have her speak to her nephrologist to see if anything different can be done.

## 2023-04-04 NOTE — TELEPHONE ENCOUNTER
Pt states that she can not take Excedrin only tylenol  Pt wants to know if there a medication that will help right away with the headaches

## 2023-04-05 NOTE — TELEPHONE ENCOUNTER
Told pt the information. Pt phone was very unstable and writer could not hear pt very well.    Told pt that writer could not hear her and to call office back with any other concerns

## 2023-04-17 ENCOUNTER — HOSPITAL ENCOUNTER (OUTPATIENT)
Dept: INTERVENTIONAL RADIOLOGY/VASCULAR | Age: 68
Discharge: HOME | End: 2023-04-17
Payer: MEDICARE

## 2023-04-17 DIAGNOSIS — N18.6 ESRD (END STAGE RENAL DISEASE) (HCC): ICD-10-CM

## 2023-04-17 DIAGNOSIS — Z01.818 PREOP EXAMINATION: ICD-10-CM

## 2023-04-17 PROCEDURE — 93971 EXTREMITY STUDY: CPT

## 2023-04-21 ENCOUNTER — HOSPITAL ENCOUNTER (EMERGENCY)
Age: 68
Discharge: HOME | End: 2023-04-21
Payer: MEDICARE

## 2023-04-21 VITALS
DIASTOLIC BLOOD PRESSURE: 69 MMHG | OXYGEN SATURATION: 97 % | HEIGHT: 60 IN | TEMPERATURE: 99.2 F | WEIGHT: 103 LBS | HEART RATE: 116 BPM | RESPIRATION RATE: 16 BRPM | SYSTOLIC BLOOD PRESSURE: 144 MMHG | BODY MASS INDEX: 20.22 KG/M2

## 2023-04-21 DIAGNOSIS — H01.005 BLEPHARITIS OF LEFT LOWER EYELID, UNSPECIFIED TYPE: Primary | ICD-10-CM

## 2023-04-21 PROCEDURE — 99213 OFFICE O/P EST LOW 20 MIN: CPT

## 2023-04-21 PROCEDURE — 99213 OFFICE O/P EST LOW 20 MIN: CPT | Performed by: NURSE PRACTITIONER

## 2023-04-21 RX ORDER — TOBRAMYCIN 3 MG/ML
1 SOLUTION/ DROPS OPHTHALMIC EVERY 4 HOURS
Qty: 5 ML | Refills: 0 | Status: ON HOLD | OUTPATIENT
Start: 2023-04-21 | End: 2023-05-01

## 2023-04-21 ASSESSMENT — ENCOUNTER SYMPTOMS
SORE THROAT: 0
COUGH: 1
EYE PAIN: 1
SHORTNESS OF BREATH: 0
PHOTOPHOBIA: 0
EYE DISCHARGE: 0
EYE ITCHING: 0
EYE REDNESS: 1

## 2023-04-21 ASSESSMENT — PAIN - FUNCTIONAL ASSESSMENT
PAIN_FUNCTIONAL_ASSESSMENT: ACTIVITIES ARE NOT PREVENTED
PAIN_FUNCTIONAL_ASSESSMENT: 0-10

## 2023-04-21 ASSESSMENT — PAIN SCALES - GENERAL: PAINLEVEL_OUTOF10: 8

## 2023-04-21 ASSESSMENT — PAIN DESCRIPTION - DESCRIPTORS: DESCRIPTORS: DISCOMFORT

## 2023-04-21 ASSESSMENT — PAIN DESCRIPTION - LOCATION: LOCATION: EYE

## 2023-04-21 ASSESSMENT — PAIN DESCRIPTION - PAIN TYPE: TYPE: ACUTE PAIN

## 2023-04-21 ASSESSMENT — PAIN DESCRIPTION - ORIENTATION: ORIENTATION: LEFT;LOWER

## 2023-04-21 NOTE — CARE COORDINATION
12/23/22, 8:56 AM EST    DISCHARGE ON GOING EVALUATION    Rajinder Giraldo day: 7  Location: [facility]/006-A Reason for admit: Dehydration [E86.0]  ERUM (acute kidney injury) ([facility] [N17.9]  Acute renal failure superimposed on chronic kidney disease, unspecified CKD stage, unspecified acute renal failure type ([address] [N17.9, N18.9]  Nausea and vomiting, unspecified vomiting type [R11.2]   Procedure: 12/22 temp HD cath placed. Barriers to Discharge: First HD run yesterday. To dialyze again today. Dr. Gill Draft plans to monitor through the weekend and will place tunneled HD cath Monday if no improvement in renal function. PCP: Iman Garza MD  Readmission Risk Score: 16.5%  Patient Goals/Plan/Treatment Preferences: Home with . Possible OP HD need. She would like OP PT/OT ordered at discharge. Brief Postoperative Note      Patient: Mike Pugh  YOB: 1977  MRN: [id number]    Date of Procedure: 4/21/2023    Pre-Op Diagnosis Codes:     * Morbid obesity ([facility] [E66.01]     * BMI 50.0-59.9, adult ([facility] [Z68.43]     * Pre-op testing [Z01.818]  Hypertension  GERD    Post-Op Diagnosis:      * Morbid obesity ([facility] [E66.01]     * BMI 50.0-59.9, adult ([facility] [Z68.43]  Hypertension  GERD  Paraesophageal hernia  Duodenal diverticulm       Procedure(s):  ESOPHAGOGASTRODUODENOSCOPY with biopsy    Surgeon(s):  Charmaine Sharma MD    Assistant:  * No surgical staff found *    Anesthesia: Monitor Anesthesia Care    Estimated Blood Loss (mL): Minimal    Complications: None    Specimens:   ID Type Source Tests Collected by Time Destination   A : Gastric Antrum Bx Tissue Gastric SURGICAL PATHOLOGY Charmaine Sharma MD 4/21/2023 1538        Implants:  * No implants in log *      Drains: * No LDAs found *    Findings:   Normal appearing Z line  Paraesophageal hernia involving cardia and part of fundus, Hill grade 4 hiatal anatomy on retroflexed view  Mild gastritis of antrum, biopsied  1-2cm duodenal diverticulum just distal to pylorus  Normal mucosa of duodenal bulb and D1 otherwise  No visualized bile reflux      Electronically signed by Charmaine Sharma MD on 4/21/2023 at 3:39 PM

## 2023-04-21 NOTE — ED TRIAGE NOTES
Patient ambulated to room with walker and states she woke with eye left eye with discomfort and swelling under eye on Thursday Provider Procedure Text (A): After obtaining clear surgical margins the defect was repaired by another provider.

## 2023-04-22 ENCOUNTER — HOSPITAL ENCOUNTER (EMERGENCY)
Age: 68
Discharge: HOME | End: 2023-04-22
Payer: MEDICARE

## 2023-04-22 VITALS
TEMPERATURE: 97.2 F | HEIGHT: 60 IN | BODY MASS INDEX: 20.22 KG/M2 | RESPIRATION RATE: 16 BRPM | SYSTOLIC BLOOD PRESSURE: 151 MMHG | DIASTOLIC BLOOD PRESSURE: 75 MMHG | OXYGEN SATURATION: 100 % | WEIGHT: 103 LBS | HEART RATE: 89 BPM

## 2023-04-22 DIAGNOSIS — L03.213 PRESEPTAL CELLULITIS OF LEFT LOWER EYELID: Primary | ICD-10-CM

## 2023-04-22 PROCEDURE — 99213 OFFICE O/P EST LOW 20 MIN: CPT

## 2023-04-22 PROCEDURE — 99213 OFFICE O/P EST LOW 20 MIN: CPT | Performed by: NURSE PRACTITIONER

## 2023-04-22 RX ORDER — CEFDINIR 300 MG/1
300 CAPSULE ORAL DAILY
Qty: 5 CAPSULE | Refills: 0 | Status: SHIPPED | OUTPATIENT
Start: 2023-04-22 | End: 2023-04-27

## 2023-04-22 ASSESSMENT — PAIN DESCRIPTION - LOCATION: LOCATION: EYE

## 2023-04-22 ASSESSMENT — ENCOUNTER SYMPTOMS
SINUS PAIN: 0
RHINORRHEA: 0
VOMITING: 0
NAUSEA: 0
DIARRHEA: 0
SHORTNESS OF BREATH: 0
SORE THROAT: 0
ABDOMINAL PAIN: 0
WHEEZING: 0
SINUS PRESSURE: 0
CONSTIPATION: 0
EYE REDNESS: 1

## 2023-04-22 ASSESSMENT — PAIN - FUNCTIONAL ASSESSMENT: PAIN_FUNCTIONAL_ASSESSMENT: 0-10

## 2023-04-22 ASSESSMENT — PAIN DESCRIPTION - DESCRIPTORS: DESCRIPTORS: ACHING;DISCOMFORT

## 2023-04-22 ASSESSMENT — PAIN DESCRIPTION - PAIN TYPE: TYPE: ACUTE PAIN

## 2023-04-22 ASSESSMENT — PAIN DESCRIPTION - ORIENTATION: ORIENTATION: LEFT

## 2023-04-22 ASSESSMENT — PAIN SCALES - GENERAL: PAINLEVEL_OUTOF10: 8

## 2023-04-22 NOTE — DISCHARGE INSTRUCTIONS
Report to the emergency room if symptoms or not improving in the next 24 hours. Take your first dose of oral antibiotics today. Take your next dose is on Monday, Wednesday, Friday after your hemodialysis.

## 2023-04-22 NOTE — ED TRIAGE NOTES
Patient to room via walker and . States she was seen yesterday at [facility] and given tobramycin drops for her left eye. States Thursday she woke with redness and swelling under left eye.  After using the drops last night and today eye pain is worse and redness is now a larger area

## 2023-04-22 NOTE — ED PROVIDER NOTES
[address]       Chief Complaint   Patient presents with    Eye Problem     left       Nurses Notes reviewed and I agree except as noted in the HPI. HISTORY OF PRESENT ILLNESS   Steven Farias is a 69 y.o. female who presents today with worsening swelling redness to the left eye. She states that she was seen yesterday. She started tobramycin. Reports that symptoms are getting worse. REVIEW OF SYSTEMS     Review of Systems   Constitutional:  Negative for chills, fatigue, fever and unexpected weight change. HENT:  Negative for congestion, postnasal drip, rhinorrhea, sinus pressure, sinus pain, sneezing and sore throat. Eyes:  Positive for redness. Respiratory:  Negative for shortness of breath and wheezing. Cardiovascular:  Negative for chest pain. Gastrointestinal:  Negative for abdominal pain, constipation, diarrhea, nausea and vomiting. Endocrine: Negative. Genitourinary:  Negative for difficulty urinating, dyspareunia, dysuria, hematuria, urgency, vaginal bleeding, vaginal discharge and vaginal pain. Musculoskeletal:  Negative for myalgias. Skin:  Negative for rash. Neurological:  Negative for dizziness, light-headedness and headaches. Hematological:  Negative for adenopathy. Psychiatric/Behavioral:  Negative for agitation. PAST MEDICAL HISTORY         Diagnosis Date    Allergic rhinitis     Anxiety     CVA (cerebral infarction)     Depression     Fibromyalgia     Headache(784.0)     Hyperlipidemia     Hypertension     Irritable bowel syndrome     Kidney failure     Unspecified cerebral artery occlusion with cerebral infarction     Unspecified sleep apnea        SURGICAL HISTORY     Patient  has a past surgical history that includes sinus surgery (10 years ago); Hemorrhoid surgery (unsure); Neck surgery (18  years ago); eye surgery; Endoscopy, colon, diagnostic (unsure); Colonoscopy (2012);  Tubal ligation; Carpal

## 2023-04-24 ENCOUNTER — HOSPITAL ENCOUNTER (EMERGENCY)
Age: 68
Discharge: HOME | End: 2023-04-24
Attending: EMERGENCY MEDICINE
Payer: MEDICARE

## 2023-04-24 ENCOUNTER — APPOINTMENT (OUTPATIENT)
Dept: GENERAL RADIOLOGY | Age: 68
End: 2023-04-24
Payer: MEDICARE

## 2023-04-24 ENCOUNTER — APPOINTMENT (OUTPATIENT)
Dept: CT IMAGING | Age: 68
End: 2023-04-24
Payer: MEDICARE

## 2023-04-24 VITALS
WEIGHT: 103 LBS | DIASTOLIC BLOOD PRESSURE: 75 MMHG | HEART RATE: 84 BPM | BODY MASS INDEX: 20.22 KG/M2 | RESPIRATION RATE: 16 BRPM | HEIGHT: 60 IN | SYSTOLIC BLOOD PRESSURE: 185 MMHG | OXYGEN SATURATION: 100 % | TEMPERATURE: 98 F
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
## 2023-04-24 DIAGNOSIS — S62.101A CLOSED FRACTURE OF RIGHT WRIST, INITIAL ENCOUNTER: Primary | ICD-10-CM

## 2023-04-24 LAB
ALBUMIN SERPL BCG-MCNC: 4.2 G/DL (ref 3.5–5.1)
ALP SERPL-CCNC: 81 U/L (ref 38–126)
ALT SERPL W/O P-5'-P-CCNC: 12 U/L (ref 11–66)
ANION GAP SERPL CALC-SCNC: 19 MEQ/L (ref 8–16)
AST SERPL-CCNC: 14 U/L (ref 5–40)
BASOPHILS ABSOLUTE: 0.1 THOU/MM3 (ref 0–0.1)
BASOPHILS NFR BLD AUTO: 0.9 %
BILIRUB SERPL-MCNC: 0.2 MG/DL (ref 0.3–1.2)
BUN SERPL-MCNC: 90 MG/DL (ref 7–22)
CALCIUM SERPL-MCNC: 8.9 MG/DL (ref 8.5–10.5)
CHLORIDE SERPL-SCNC: 104 MEQ/L (ref 98–111)
CO2 SERPL-SCNC: 17 MEQ/L (ref 23–33)
CREAT SERPL-MCNC: 8.2 MG/DL (ref 0.4–1.2)
DEPRECATED RDW RBC AUTO: 49.2 FL (ref 35–45)
EOSINOPHIL NFR BLD AUTO: 1.2 %
EOSINOPHILS ABSOLUTE: 0.1 THOU/MM3 (ref 0–0.4)
ERYTHROCYTE [DISTWIDTH] IN BLOOD BY AUTOMATED COUNT: 14.2 % (ref 11.5–14.5)
GFR SERPL CREATININE-BSD FRML MDRD: 5 ML/MIN/1.73M2
GLUCOSE SERPL-MCNC: 89 MG/DL (ref 70–108)
HCT VFR BLD AUTO: 37.3 % (ref 37–47)
HGB BLD-MCNC: 11.2 GM/DL (ref 12–16)
IMM GRANULOCYTES # BLD AUTO: 0.04 THOU/MM3 (ref 0–0.07)
IMM GRANULOCYTES NFR BLD AUTO: 0.6 %
LYMPHOCYTES ABSOLUTE: 1.2 THOU/MM3 (ref 1–4.8)
LYMPHOCYTES NFR BLD AUTO: 18.8 %
MCH RBC QN AUTO: 28.6 PG (ref 26–33)
MCHC RBC AUTO-ENTMCNC: 30 GM/DL (ref 32.2–35.5)
MCV RBC AUTO: 95.2 FL (ref 81–99)
MONOCYTES ABSOLUTE: 0.3 THOU/MM3 (ref 0.4–1.3)
MONOCYTES NFR BLD AUTO: 5.3 %
NEUTROPHILS NFR BLD AUTO: 73.2 %
NRBC BLD AUTO-RTO: 0 /100 WBC
NT-PROBNP SERPL IA-MCNC: 1414 PG/ML (ref 0–124)
OSMOLALITY SERPL CALC.SUM OF ELEC: 306.5 MOSMOL/KG (ref 275–300)
PLATELET # BLD AUTO: 197 THOU/MM3 (ref 130–400)
PMV BLD AUTO: 11.2 FL (ref 9.4–12.4)
POTASSIUM SERPL-SCNC: 5.2 MEQ/L (ref 3.5–5.2)
PROT SERPL-MCNC: 6.7 G/DL (ref 6.1–8)
RBC # BLD AUTO: 3.92 MILL/MM3 (ref 4.2–5.4)
SEGMENTED NEUTROPHILS ABSOLUTE COUNT: 4.8 THOU/MM3 (ref 1.8–7.7)
SODIUM SERPL-SCNC: 140 MEQ/L (ref 135–145)
TROPONIN T: 0.04 NG/ML
WBC # BLD AUTO: 6.6 THOU/MM3 (ref 4.8–10.8)

## 2023-04-24 PROCEDURE — 80053 COMPREHEN METABOLIC PANEL: CPT

## 2023-04-24 PROCEDURE — 83880 ASSAY OF NATRIURETIC PEPTIDE: CPT

## 2023-04-24 PROCEDURE — 36415 COLL VENOUS BLD VENIPUNCTURE: CPT

## 2023-04-24 PROCEDURE — 93005 ELECTROCARDIOGRAM TRACING: CPT | Performed by: EMERGENCY MEDICINE

## 2023-04-24 PROCEDURE — 96374 THER/PROPH/DIAG INJ IV PUSH: CPT

## 2023-04-24 PROCEDURE — 73610 X-RAY EXAM OF ANKLE: CPT

## 2023-04-24 PROCEDURE — 71045 X-RAY EXAM CHEST 1 VIEW: CPT

## 2023-04-24 PROCEDURE — 73110 X-RAY EXAM OF WRIST: CPT

## 2023-04-24 PROCEDURE — 84484 ASSAY OF TROPONIN QUANT: CPT

## 2023-04-24 PROCEDURE — 6360000002 HC RX W HCPCS

## 2023-04-24 PROCEDURE — 99285 EMERGENCY DEPT VISIT HI MDM: CPT

## 2023-04-24 PROCEDURE — 72125 CT NECK SPINE W/O DYE: CPT

## 2023-04-24 PROCEDURE — 85025 COMPLETE CBC W/AUTO DIFF WBC: CPT

## 2023-04-24 PROCEDURE — 6370000000 HC RX 637 (ALT 250 FOR IP)

## 2023-04-24 RX ORDER — MORPHINE SULFATE 4 MG/ML
4 INJECTION, SOLUTION INTRAMUSCULAR; INTRAVENOUS ONCE
Status: COMPLETED | OUTPATIENT
Start: 2023-04-24 | End: 2023-04-24

## 2023-04-24 RX ORDER — HYDROCODONE BITARTRATE AND ACETAMINOPHEN 5; 325 MG/1; MG/1
1 TABLET ORAL ONCE
Status: COMPLETED | OUTPATIENT
Start: 2023-04-24 | End: 2023-04-24

## 2023-04-24 RX ADMIN — MORPHINE SULFATE 4 MG: 4 INJECTION, SOLUTION INTRAMUSCULAR; INTRAVENOUS at 12:44

## 2023-04-24 RX ADMIN — HYDROCODONE BITARTRATE AND ACETAMINOPHEN 1 TABLET: 5; 325 TABLET ORAL at 14:01

## 2023-04-24 ASSESSMENT — PAIN - FUNCTIONAL ASSESSMENT
PAIN_FUNCTIONAL_ASSESSMENT: 0-10
PAIN_FUNCTIONAL_ASSESSMENT: 0-10

## 2023-04-24 ASSESSMENT — PAIN SCALES - GENERAL
PAINLEVEL_OUTOF10: 7
PAINLEVEL_OUTOF10: 7

## 2023-04-24 ASSESSMENT — PAIN DESCRIPTION - LOCATION: LOCATION: BACK;NECK

## 2023-04-24 NOTE — DISCHARGE INSTRUCTIONS
Please follow up with [facility] tomorrow for your wrist fracture. Keep the splint on, and use ibuprofen or tylenol as needed for pain control. You may also use hot or cold packs. Please go for your dialysis as scheduled today at 3 pm here at [facility]. Return to the ED if you develop dizziness, chest pain, shortness of breath, or worsening wrist or ankle pain. Thank you for giving us the opportunity to care for you today.

## 2023-04-24 NOTE — ED NOTES
Pt and family updated on POC. IV established and blood work obtained. Pt medicated per MAR. Pt to radiology at this time.  [facility]  04/24/23 7320

## 2023-04-24 NOTE — ED TRIAGE NOTES
Pt to ED due to a fall. Pt was a rapid response. Pt was here to see a dr. Yuliya Wang on the second floor. Pt states that she was feeling lightheaded and then she fell. Bystanders state that the pt started shuffling her feet and then fell onto her right leg and arm. Bystander thinks the pts head bounced off the carpet. Pt is complaining on lower neck/upper back tenderness. Pt is A&O x4. C-collar in place. EKG done. VSS.

## 2023-04-24 NOTE — ED PROVIDER NOTES
[address] [facility] [address] [address] EMERGENCY DEPT      EMERGENCY MEDICINE     Pt Name: Geovanni Herrera  MRN: [id number]  Farazgftorin 1955  Date of evaluation: 4/24/2023  Provider: Zulay Moreno MD, [facility]       Chief Complaint   Patient presents with    Fall    Neck Pain     HISTORY OF [address] Debbie Obregon is a pleasant 69 y.o. female who presents to the emergency department for evaluation of fall. The patient and her , patient was on her way for vascular surgery appointment with Dr. Lennox Danielson here at [facility] today. Patient uses a 4 wheeled walker and states that she fell over sideways when she was trying to turn. Patient states she tipped her walker. Patient states it was purely mechanical fall, denies any dizziness, no lightheadedness prior to the episode, no feelings of chest pain shortness of breath, no heart palpitations. Patient states she has been eating and drinking enough. Patient states she tried to catch herself when she was falling and hurt her right ankle and right wrist.  Patient denies no known history of seizures, has not had any fevers or chills recently, no nausea or vomiting before or after the fall, no urinary urgency frequency or dysuria. Patient has not had any recent URI or viral symptoms. Patient has no other acute complaints at this time.     PASTMEDICAL HISTORY/Co-Morbid Conditions:     Past Medical History:   Diagnosis Date    Allergic rhinitis     Anxiety     CVA (cerebral infarction)     Depression     Fibromyalgia     Headache(784.0)     Hyperlipidemia     Hypertension     Irritable bowel syndrome     Kidney failure     Unspecified cerebral artery occlusion with cerebral infarction     Unspecified sleep apnea        Patient Active Problem List   Diagnosis Code    Cerebral infarction ([facility] [address] I63.9    Hx of Chest pain, atypical- tenderness on the left lower chest wall R07.89    Anxiety disorder F41.9    History of CVA (cerebrovascular accident) Z86.73

## 2023-04-25 PROCEDURE — 93010 ELECTROCARDIOGRAM REPORT: CPT | Performed by: INTERNAL MEDICINE

## 2023-04-28 ENCOUNTER — HOSPITAL ENCOUNTER (INPATIENT)
Age: 68
LOS: 1 days | Discharge: HOME HEALTH | DRG: 884 | End: 2023-05-02
Attending: PHYSICIAN ASSISTANT | Admitting: STUDENT IN AN ORGANIZED HEALTH CARE EDUCATION/TRAINING PROGRAM
Payer: MEDICARE

## 2023-04-28 DIAGNOSIS — N18.4 STAGE 4 CHRONIC KIDNEY DISEASE (HCC): Primary | ICD-10-CM

## 2023-04-28 DIAGNOSIS — R53.1 GENERALIZED WEAKNESS: ICD-10-CM

## 2023-04-28 PROBLEM — E87.5 HYPERKALEMIA: Status: ACTIVE | Noted: 2023-04-28

## 2023-04-28 PROBLEM — N18.6 END STAGE RENAL DISEASE (HCC): Status: ACTIVE | Noted: 2023-04-28

## 2023-04-28 LAB
ALBUMIN SERPL BCG-MCNC: 4.8 G/DL (ref 3.5–5.1)
ALP SERPL-CCNC: 97 U/L (ref 38–126)
ALT SERPL W/O P-5'-P-CCNC: 10 U/L (ref 11–66)
ANION GAP SERPL CALC-SCNC: 25 MEQ/L (ref 8–16)
AST SERPL-CCNC: 15 U/L (ref 5–40)
BACTERIA: ABNORMAL
BASOPHILS ABSOLUTE: 0.1 THOU/MM3 (ref 0–0.1)
BASOPHILS NFR BLD AUTO: 0.9 %
BILIRUB CONJ SERPL-MCNC: < 0.2 MG/DL (ref 0–0.3)
BILIRUB SERPL-MCNC: 0.3 MG/DL (ref 0.3–1.2)
BILIRUB UR QL STRIP: NEGATIVE
BUN SERPL-MCNC: 72 MG/DL (ref 7–22)
CALCIUM SERPL-MCNC: 10 MG/DL (ref 8.5–10.5)
CASTS #/AREA URNS LPF: ABNORMAL /LPF
CASTS #/AREA URNS LPF: ABNORMAL /LPF
CHARACTER UR: CLEAR
CHARCOAL URNS QL MICRO: ABNORMAL
CHLORIDE SERPL-SCNC: 95 MEQ/L (ref 98–111)
CO2 SERPL-SCNC: 15 MEQ/L (ref 23–33)
COLOR UR: YELLOW
CREAT SERPL-MCNC: 7.8 MG/DL (ref 0.4–1.2)
CRYSTALS URNS QL MICRO: ABNORMAL
DEPRECATED RDW RBC AUTO: 48.2 FL (ref 35–45)
EKG ATRIAL RATE: 88 BPM
EKG ATRIAL RATE: 93 BPM
EKG P AXIS: 76 DEGREES
EKG P AXIS: 80 DEGREES
EKG P-R INTERVAL: 140 MS
EKG P-R INTERVAL: 146 MS
EKG Q-T INTERVAL: 360 MS
EKG Q-T INTERVAL: 362 MS
EKG QRS DURATION: 114 MS
EKG QRS DURATION: 116 MS
EKG QTC CALCULATION (BAZETT): 435 MS
EKG QTC CALCULATION (BAZETT): 450 MS
EKG R AXIS: 82 DEGREES
EKG R AXIS: 82 DEGREES
EKG T AXIS: 65 DEGREES
EKG T AXIS: 69 DEGREES
EKG VENTRICULAR RATE: 88 BPM
EKG VENTRICULAR RATE: 93 BPM
EOSINOPHIL NFR BLD AUTO: 0.2 %
EOSINOPHILS ABSOLUTE: 0 THOU/MM3 (ref 0–0.4)
EPITHELIAL CELLS, UA: ABNORMAL /HPF
ERYTHROCYTE [DISTWIDTH] IN BLOOD BY AUTOMATED COUNT: 14.1 % (ref 11.5–14.5)
FLUAV RNA RESP QL NAA+PROBE: NOT DETECTED
FLUBV RNA RESP QL NAA+PROBE: NOT DETECTED
GFR SERPL CREATININE-BSD FRML MDRD: 5 ML/MIN/1.73M2
GLUCOSE SERPL-MCNC: 75 MG/DL (ref 70–108)
GLUCOSE UR QL STRIP.AUTO: NEGATIVE MG/DL
HCT VFR BLD AUTO: 41.7 % (ref 37–47)
HGB BLD-MCNC: 12.9 GM/DL (ref 12–16)
HGB UR QL STRIP.AUTO: NEGATIVE
IMM GRANULOCYTES # BLD AUTO: 0.07 THOU/MM3 (ref 0–0.07)
IMM GRANULOCYTES NFR BLD AUTO: 0.9 %
KETONES UR QL STRIP.AUTO: NEGATIVE
LEUKOCYTE ESTERASE UR QL STRIP.AUTO: ABNORMAL
LIPASE SERPL-CCNC: 43.2 U/L (ref 5.6–51.3)
LYMPHOCYTES ABSOLUTE: 1.6 THOU/MM3 (ref 1–4.8)
LYMPHOCYTES NFR BLD AUTO: 20.1 %
MCH RBC QN AUTO: 28.7 PG (ref 26–33)
MCHC RBC AUTO-ENTMCNC: 30.9 GM/DL (ref 32.2–35.5)
MCV RBC AUTO: 92.7 FL (ref 81–99)
MONOCYTES ABSOLUTE: 0.5 THOU/MM3 (ref 0.4–1.3)
MONOCYTES NFR BLD AUTO: 5.9 %
NEUTROPHILS NFR BLD AUTO: 72 %
NITRITE UR QL STRIP.AUTO: NEGATIVE
NRBC BLD AUTO-RTO: 0 /100 WBC
PH UR STRIP.AUTO: 5 [PH] (ref 5–9)
PLATELET # BLD AUTO: 240 THOU/MM3 (ref 130–400)
PMV BLD AUTO: 11.6 FL (ref 9.4–12.4)
POTASSIUM SERPL-SCNC: 5.5 MEQ/L (ref 3.5–5.2)
PROT SERPL-MCNC: 7.9 G/DL (ref 6.1–8)
PROT UR STRIP.AUTO-MCNC: 30 MG/DL
RBC # BLD AUTO: 4.5 MILL/MM3 (ref 4.2–5.4)
RBC #/AREA URNS HPF: ABNORMAL /HPF
RENAL EPI CELLS #/AREA URNS HPF: ABNORMAL /[HPF]
SARS-COV-2 RNA RESP QL NAA+PROBE: NOT DETECTED
SEGMENTED NEUTROPHILS ABSOLUTE COUNT: 5.9 THOU/MM3 (ref 1.8–7.7)
SODIUM SERPL-SCNC: 135 MEQ/L (ref 135–145)
SP GR UR REFRACT.AUTO: 1.01 (ref 1–1.03)
TROPONIN T: 0.22 NG/ML
UROBILINOGEN UR QL STRIP.AUTO: 0.2 EU/DL (ref 0–1)
WBC # BLD AUTO: 8.2 THOU/MM3 (ref 4.8–10.8)
WBC #/AREA URNS HPF: ABNORMAL /HPF
YEAST LIKE FUNGI URNS QL MICRO: ABNORMAL

## 2023-04-28 PROCEDURE — 93010 ELECTROCARDIOGRAM REPORT: CPT | Performed by: INTERNAL MEDICINE

## 2023-04-28 PROCEDURE — 96375 TX/PRO/DX INJ NEW DRUG ADDON: CPT

## 2023-04-28 PROCEDURE — 81001 URINALYSIS AUTO W/SCOPE: CPT

## 2023-04-28 PROCEDURE — 6360000002 HC RX W HCPCS: Performed by: PHYSICIAN ASSISTANT

## 2023-04-28 PROCEDURE — 6360000002 HC RX W HCPCS: Performed by: NURSE PRACTITIONER

## 2023-04-28 PROCEDURE — 2580000003 HC RX 258: Performed by: PHYSICIAN ASSISTANT

## 2023-04-28 PROCEDURE — 84484 ASSAY OF TROPONIN QUANT: CPT

## 2023-04-28 PROCEDURE — 5A1D70Z PERFORMANCE OF URINARY FILTRATION, INTERMITTENT, LESS THAN 6 HOURS PER DAY: ICD-10-PCS | Performed by: INTERNAL MEDICINE

## 2023-04-28 PROCEDURE — 80076 HEPATIC FUNCTION PANEL: CPT

## 2023-04-28 PROCEDURE — 6370000000 HC RX 637 (ALT 250 FOR IP): Performed by: PHYSICIAN ASSISTANT

## 2023-04-28 PROCEDURE — G0378 HOSPITAL OBSERVATION PER HR: HCPCS

## 2023-04-28 PROCEDURE — 80048 BASIC METABOLIC PNL TOTAL CA: CPT

## 2023-04-28 PROCEDURE — 99285 EMERGENCY DEPT VISIT HI MDM: CPT

## 2023-04-28 PROCEDURE — 99221 1ST HOSP IP/OBS SF/LOW 40: CPT | Performed by: INTERNAL MEDICINE

## 2023-04-28 PROCEDURE — 93005 ELECTROCARDIOGRAM TRACING: CPT | Performed by: NURSE PRACTITIONER

## 2023-04-28 PROCEDURE — 90935 HEMODIALYSIS ONE EVALUATION: CPT

## 2023-04-28 PROCEDURE — 85025 COMPLETE CBC W/AUTO DIFF WBC: CPT

## 2023-04-28 PROCEDURE — 96374 THER/PROPH/DIAG INJ IV PUSH: CPT

## 2023-04-28 PROCEDURE — 99223 1ST HOSP IP/OBS HIGH 75: CPT | Performed by: PHYSICIAN ASSISTANT

## 2023-04-28 PROCEDURE — 36415 COLL VENOUS BLD VENIPUNCTURE: CPT

## 2023-04-28 PROCEDURE — 87636 SARSCOV2 & INF A&B AMP PRB: CPT

## 2023-04-28 PROCEDURE — 83690 ASSAY OF LIPASE: CPT

## 2023-04-28 RX ORDER — FLUTICASONE PROPIONATE 50 MCG
1 SPRAY, SUSPENSION (ML) NASAL DAILY
Status: CANCELLED | OUTPATIENT
Start: 2023-04-28

## 2023-04-28 RX ORDER — ATORVASTATIN CALCIUM 10 MG/1
10 TABLET, FILM COATED ORAL DAILY
Status: CANCELLED | OUTPATIENT
Start: 2023-04-28

## 2023-04-28 RX ORDER — MONTELUKAST SODIUM 10 MG/1
10 TABLET ORAL DAILY
Status: CANCELLED | OUTPATIENT
Start: 2023-04-28

## 2023-04-28 RX ORDER — CETIRIZINE HYDROCHLORIDE 5 MG/1
5 TABLET ORAL NIGHTLY PRN
Status: DISCONTINUED | OUTPATIENT
Start: 2023-04-28 | End: 2023-05-02 | Stop reason: HOSPADM

## 2023-04-28 RX ORDER — ONDANSETRON 4 MG/1
4 TABLET, ORALLY DISINTEGRATING ORAL EVERY 8 HOURS PRN
Status: DISCONTINUED | OUTPATIENT
Start: 2023-04-28 | End: 2023-05-02 | Stop reason: HOSPADM

## 2023-04-28 RX ORDER — MONTELUKAST SODIUM 10 MG/1
10 TABLET ORAL DAILY
Status: DISCONTINUED | OUTPATIENT
Start: 2023-04-28 | End: 2023-05-02 | Stop reason: HOSPADM

## 2023-04-28 RX ORDER — BUPROPION HYDROCHLORIDE 100 MG/1
200 TABLET, EXTENDED RELEASE ORAL 2 TIMES DAILY
Status: CANCELLED | OUTPATIENT
Start: 2023-04-28

## 2023-04-28 RX ORDER — POLYETHYLENE GLYCOL 3350 17 G/17G
17 POWDER, FOR SOLUTION ORAL DAILY PRN
Status: DISCONTINUED | OUTPATIENT
Start: 2023-04-28 | End: 2023-05-02 | Stop reason: HOSPADM

## 2023-04-28 RX ORDER — TRAZODONE HYDROCHLORIDE 50 MG/1
50 TABLET ORAL NIGHTLY
Status: DISCONTINUED | OUTPATIENT
Start: 2023-04-28 | End: 2023-05-02 | Stop reason: HOSPADM

## 2023-04-28 RX ORDER — CLOPIDOGREL BISULFATE 75 MG/1
75 TABLET ORAL DAILY
Status: DISCONTINUED | OUTPATIENT
Start: 2023-04-28 | End: 2023-05-02 | Stop reason: HOSPADM

## 2023-04-28 RX ORDER — POLYETHYLENE GLYCOL 3350 17 G/17G
17 POWDER, FOR SOLUTION ORAL DAILY
Status: DISCONTINUED | OUTPATIENT
Start: 2023-04-28 | End: 2023-05-02 | Stop reason: HOSPADM

## 2023-04-28 RX ORDER — ASPIRIN 81 MG/1
81 TABLET, CHEWABLE ORAL DAILY
Status: DISCONTINUED | OUTPATIENT
Start: 2023-04-28 | End: 2023-05-02 | Stop reason: HOSPADM

## 2023-04-28 RX ORDER — SODIUM CHLORIDE 0.9 % (FLUSH) 0.9 %
5-40 SYRINGE (ML) INJECTION PRN
Status: DISCONTINUED | OUTPATIENT
Start: 2023-04-28 | End: 2023-05-02 | Stop reason: HOSPADM

## 2023-04-28 RX ORDER — TRAZODONE HYDROCHLORIDE 50 MG/1
50 TABLET ORAL NIGHTLY
Status: CANCELLED | OUTPATIENT
Start: 2023-04-28

## 2023-04-28 RX ORDER — CLOPIDOGREL BISULFATE 75 MG/1
75 TABLET ORAL DAILY
Status: CANCELLED | OUTPATIENT
Start: 2023-04-28

## 2023-04-28 RX ORDER — SODIUM CHLORIDE 9 MG/ML
INJECTION, SOLUTION INTRAVENOUS PRN
Status: DISCONTINUED | OUTPATIENT
Start: 2023-04-28 | End: 2023-05-02 | Stop reason: HOSPADM

## 2023-04-28 RX ORDER — CETIRIZINE HYDROCHLORIDE 10 MG/1
5 TABLET ORAL NIGHTLY PRN
Status: CANCELLED | OUTPATIENT
Start: 2023-04-28

## 2023-04-28 RX ORDER — ONDANSETRON 2 MG/ML
4 INJECTION INTRAMUSCULAR; INTRAVENOUS EVERY 6 HOURS PRN
Status: DISCONTINUED | OUTPATIENT
Start: 2023-04-28 | End: 2023-05-02 | Stop reason: HOSPADM

## 2023-04-28 RX ORDER — PROCHLORPERAZINE EDISYLATE 5 MG/ML
10 INJECTION INTRAMUSCULAR; INTRAVENOUS ONCE
Status: COMPLETED | OUTPATIENT
Start: 2023-04-28 | End: 2023-04-28

## 2023-04-28 RX ORDER — SODIUM CHLORIDE 0.9 % (FLUSH) 0.9 %
5-40 SYRINGE (ML) INJECTION EVERY 12 HOURS SCHEDULED
Status: DISCONTINUED | OUTPATIENT
Start: 2023-04-28 | End: 2023-05-02 | Stop reason: HOSPADM

## 2023-04-28 RX ORDER — HEPARIN SODIUM 5000 [USP'U]/ML
5000 INJECTION, SOLUTION INTRAVENOUS; SUBCUTANEOUS EVERY 12 HOURS
Status: DISCONTINUED | OUTPATIENT
Start: 2023-04-28 | End: 2023-05-02 | Stop reason: HOSPADM

## 2023-04-28 RX ORDER — ACETAMINOPHEN 325 MG/1
650 TABLET ORAL EVERY 6 HOURS PRN
Status: DISCONTINUED | OUTPATIENT
Start: 2023-04-28 | End: 2023-05-02 | Stop reason: HOSPADM

## 2023-04-28 RX ORDER — DOCUSATE SODIUM 100 MG/1
100 CAPSULE, LIQUID FILLED ORAL DAILY PRN
Status: DISCONTINUED | OUTPATIENT
Start: 2023-04-28 | End: 2023-05-02 | Stop reason: HOSPADM

## 2023-04-28 RX ORDER — DIPHENHYDRAMINE HYDROCHLORIDE 50 MG/ML
12.5 INJECTION INTRAMUSCULAR; INTRAVENOUS ONCE
Status: COMPLETED | OUTPATIENT
Start: 2023-04-28 | End: 2023-04-28

## 2023-04-28 RX ORDER — ACETAMINOPHEN 650 MG/1
650 SUPPOSITORY RECTAL EVERY 6 HOURS PRN
Status: DISCONTINUED | OUTPATIENT
Start: 2023-04-28 | End: 2023-05-02 | Stop reason: HOSPADM

## 2023-04-28 RX ORDER — BUPROPION HYDROCHLORIDE 100 MG/1
200 TABLET, EXTENDED RELEASE ORAL 2 TIMES DAILY
Status: DISCONTINUED | OUTPATIENT
Start: 2023-04-28 | End: 2023-05-02 | Stop reason: HOSPADM

## 2023-04-28 RX ADMIN — CLOPIDOGREL BISULFATE 75 MG: 75 TABLET ORAL at 21:58

## 2023-04-28 RX ADMIN — HEPARIN SODIUM 5000 UNITS: 5000 INJECTION INTRAVENOUS; SUBCUTANEOUS at 21:58

## 2023-04-28 RX ADMIN — SODIUM CHLORIDE, PRESERVATIVE FREE 10 ML: 5 INJECTION INTRAVENOUS at 21:59

## 2023-04-28 RX ADMIN — ASPIRIN 81 MG 81 MG: 81 TABLET ORAL at 21:58

## 2023-04-28 RX ADMIN — TRAZODONE HYDROCHLORIDE 50 MG: 50 TABLET ORAL at 21:58

## 2023-04-28 RX ADMIN — CETIRIZINE HYDROCHLORIDE 5 MG: 5 TABLET ORAL at 21:58

## 2023-04-28 RX ADMIN — POLYETHYLENE GLYCOL (3350) 17 G: 17 POWDER, FOR SOLUTION ORAL at 21:59

## 2023-04-28 RX ADMIN — BUPROPION HYDROCHLORIDE 200 MG: 100 TABLET, FILM COATED, EXTENDED RELEASE ORAL at 21:58

## 2023-04-28 RX ADMIN — MONTELUKAST SODIUM 10 MG: 10 TABLET ORAL at 21:58

## 2023-04-28 RX ADMIN — PROCHLORPERAZINE EDISYLATE 10 MG: 5 INJECTION INTRAMUSCULAR; INTRAVENOUS at 15:08

## 2023-04-28 RX ADMIN — DIPHENHYDRAMINE HYDROCHLORIDE 12.5 MG: 50 INJECTION, SOLUTION INTRAMUSCULAR; INTRAVENOUS at 15:07

## 2023-04-28 ASSESSMENT — ENCOUNTER SYMPTOMS
DIARRHEA: 0
EYES NEGATIVE: 1
NAUSEA: 1
EYE PAIN: 0
SHORTNESS OF BREATH: 0
SORE THROAT: 0
BACK PAIN: 0
ABDOMINAL PAIN: 0
VOMITING: 1
COUGH: 0

## 2023-04-29 LAB
ANION GAP SERPL CALC-SCNC: 18 MEQ/L (ref 8–16)
BASOPHILS ABSOLUTE: 0.1 THOU/MM3 (ref 0–0.1)
BASOPHILS NFR BLD AUTO: 1.1 %
BUN SERPL-MCNC: 32 MG/DL (ref 7–22)
CALCIUM SERPL-MCNC: 8.6 MG/DL (ref 8.5–10.5)
CHLORIDE SERPL-SCNC: 102 MEQ/L (ref 98–111)
CO2 SERPL-SCNC: 17 MEQ/L (ref 23–33)
CREAT SERPL-MCNC: 4.1 MG/DL (ref 0.4–1.2)
DEPRECATED RDW RBC AUTO: 45.5 FL (ref 35–45)
EOSINOPHIL NFR BLD AUTO: 0.9 %
EOSINOPHILS ABSOLUTE: 0.1 THOU/MM3 (ref 0–0.4)
ERYTHROCYTE [DISTWIDTH] IN BLOOD BY AUTOMATED COUNT: 13.9 % (ref 11.5–14.5)
GFR SERPL CREATININE-BSD FRML MDRD: 11 ML/MIN/1.73M2
GLUCOSE SERPL-MCNC: 58 MG/DL (ref 70–108)
HCT VFR BLD AUTO: 35.3 % (ref 37–47)
HGB BLD-MCNC: 11.2 GM/DL (ref 12–16)
IMM GRANULOCYTES # BLD AUTO: 0.07 THOU/MM3 (ref 0–0.07)
IMM GRANULOCYTES NFR BLD AUTO: 0.9 %
LYMPHOCYTES ABSOLUTE: 2.8 THOU/MM3 (ref 1–4.8)
LYMPHOCYTES NFR BLD AUTO: 35.8 %
MCH RBC QN AUTO: 28.6 PG (ref 26–33)
MCHC RBC AUTO-ENTMCNC: 31.7 GM/DL (ref 32.2–35.5)
MCV RBC AUTO: 90.1 FL (ref 81–99)
MONOCYTES ABSOLUTE: 0.7 THOU/MM3 (ref 0.4–1.3)
MONOCYTES NFR BLD AUTO: 8.6 %
NEUTROPHILS NFR BLD AUTO: 52.7 %
NRBC BLD AUTO-RTO: 0 /100 WBC
OSMOLALITY SERPL CALC.SUM OF ELEC: 278.5 MOSMOL/KG (ref 275–300)
PLATELET # BLD AUTO: 225 THOU/MM3 (ref 130–400)
PMV BLD AUTO: 11.5 FL (ref 9.4–12.4)
POTASSIUM SERPL-SCNC: 4.2 MEQ/L (ref 3.5–5.2)
RBC # BLD AUTO: 3.92 MILL/MM3 (ref 4.2–5.4)
SEGMENTED NEUTROPHILS ABSOLUTE COUNT: 4.1 THOU/MM3 (ref 1.8–7.7)
SODIUM SERPL-SCNC: 137 MEQ/L (ref 135–145)
TROPONIN T: 0.19 NG/ML
TROPONIN T: 0.21 NG/ML
WBC # BLD AUTO: 7.8 THOU/MM3 (ref 4.8–10.8)

## 2023-04-29 PROCEDURE — G0378 HOSPITAL OBSERVATION PER HR: HCPCS

## 2023-04-29 PROCEDURE — 6370000000 HC RX 637 (ALT 250 FOR IP): Performed by: INTERNAL MEDICINE

## 2023-04-29 PROCEDURE — 99232 SBSQ HOSP IP/OBS MODERATE 35: CPT | Performed by: INTERNAL MEDICINE

## 2023-04-29 PROCEDURE — 84484 ASSAY OF TROPONIN QUANT: CPT

## 2023-04-29 PROCEDURE — 85025 COMPLETE CBC W/AUTO DIFF WBC: CPT

## 2023-04-29 PROCEDURE — 2580000003 HC RX 258: Performed by: PHYSICIAN ASSISTANT

## 2023-04-29 PROCEDURE — 99222 1ST HOSP IP/OBS MODERATE 55: CPT | Performed by: HOSPITALIST

## 2023-04-29 PROCEDURE — 6370000000 HC RX 637 (ALT 250 FOR IP): Performed by: PHYSICIAN ASSISTANT

## 2023-04-29 PROCEDURE — 36415 COLL VENOUS BLD VENIPUNCTURE: CPT

## 2023-04-29 PROCEDURE — 80048 BASIC METABOLIC PNL TOTAL CA: CPT

## 2023-04-29 PROCEDURE — 6360000002 HC RX W HCPCS: Performed by: PHYSICIAN ASSISTANT

## 2023-04-29 PROCEDURE — 2580000003 HC RX 258: Performed by: INTERNAL MEDICINE

## 2023-04-29 PROCEDURE — 51798 US URINE CAPACITY MEASURE: CPT

## 2023-04-29 RX ORDER — SODIUM BICARBONATE 650 MG/1
1300 TABLET ORAL 2 TIMES DAILY
Status: DISCONTINUED | OUTPATIENT
Start: 2023-04-29 | End: 2023-05-01

## 2023-04-29 RX ORDER — SODIUM CHLORIDE 9 MG/ML
INJECTION, SOLUTION INTRAVENOUS CONTINUOUS
Status: DISCONTINUED | OUTPATIENT
Start: 2023-04-29 | End: 2023-04-30

## 2023-04-29 RX ADMIN — ACETAMINOPHEN 650 MG: 325 TABLET ORAL at 09:06

## 2023-04-29 RX ADMIN — SODIUM CHLORIDE, PRESERVATIVE FREE 10 ML: 5 INJECTION INTRAVENOUS at 09:07

## 2023-04-29 RX ADMIN — DOCUSATE SODIUM 100 MG: 100 CAPSULE, LIQUID FILLED ORAL at 09:06

## 2023-04-29 RX ADMIN — HEPARIN SODIUM 5000 UNITS: 5000 INJECTION INTRAVENOUS; SUBCUTANEOUS at 20:04

## 2023-04-29 RX ADMIN — ASPIRIN 81 MG 81 MG: 81 TABLET ORAL at 09:06

## 2023-04-29 RX ADMIN — BUPROPION HYDROCHLORIDE 200 MG: 100 TABLET, FILM COATED, EXTENDED RELEASE ORAL at 09:06

## 2023-04-29 RX ADMIN — BUPROPION HYDROCHLORIDE 200 MG: 100 TABLET, FILM COATED, EXTENDED RELEASE ORAL at 20:03

## 2023-04-29 RX ADMIN — SODIUM BICARBONATE 1300 MG: 650 TABLET ORAL at 20:03

## 2023-04-29 RX ADMIN — MONTELUKAST SODIUM 10 MG: 10 TABLET ORAL at 09:06

## 2023-04-29 RX ADMIN — ACETAMINOPHEN 650 MG: 325 TABLET ORAL at 20:04

## 2023-04-29 RX ADMIN — SODIUM CHLORIDE: 9 INJECTION, SOLUTION INTRAVENOUS at 14:19

## 2023-04-29 RX ADMIN — HEPARIN SODIUM 5000 UNITS: 5000 INJECTION INTRAVENOUS; SUBCUTANEOUS at 09:07

## 2023-04-29 RX ADMIN — POLYETHYLENE GLYCOL (3350) 17 G: 17 POWDER, FOR SOLUTION ORAL at 09:07

## 2023-04-29 RX ADMIN — TRAZODONE HYDROCHLORIDE 50 MG: 50 TABLET ORAL at 20:04

## 2023-04-29 RX ADMIN — CLOPIDOGREL BISULFATE 75 MG: 75 TABLET ORAL at 09:06

## 2023-04-29 RX ADMIN — SODIUM BICARBONATE 1300 MG: 650 TABLET ORAL at 14:20

## 2023-04-29 ASSESSMENT — PAIN SCALES - GENERAL
PAINLEVEL_OUTOF10: 3
PAINLEVEL_OUTOF10: 6
PAINLEVEL_OUTOF10: 5

## 2023-04-29 ASSESSMENT — PAIN DESCRIPTION - LOCATION
LOCATION: ABDOMEN
LOCATION: SHOULDER
LOCATION: ABDOMEN

## 2023-04-29 ASSESSMENT — PAIN DESCRIPTION - ORIENTATION: ORIENTATION: RIGHT

## 2023-04-29 ASSESSMENT — PAIN DESCRIPTION - DESCRIPTORS
DESCRIPTORS: ACHING;DISCOMFORT
DESCRIPTORS: DISCOMFORT

## 2023-04-29 ASSESSMENT — PAIN - FUNCTIONAL ASSESSMENT: PAIN_FUNCTIONAL_ASSESSMENT: ACTIVITIES ARE NOT PREVENTED

## 2023-04-29 ASSESSMENT — PAIN SCALES - WONG BAKER: WONGBAKER_NUMERICALRESPONSE: 0

## 2023-04-30 LAB
ALBUMIN SERPL BCG-MCNC: 3.2 G/DL (ref 3.5–5.1)
ALP SERPL-CCNC: 72 U/L (ref 38–126)
ALT SERPL W/O P-5'-P-CCNC: 11 U/L (ref 11–66)
ANION GAP SERPL CALC-SCNC: 18 MEQ/L (ref 8–16)
AST SERPL-CCNC: 15 U/L (ref 5–40)
BILIRUB SERPL-MCNC: 0.2 MG/DL (ref 0.3–1.2)
BUN SERPL-MCNC: 51 MG/DL (ref 7–22)
CALCIUM SERPL-MCNC: 8 MG/DL (ref 8.5–10.5)
CHLORIDE SERPL-SCNC: 104 MEQ/L (ref 98–111)
CO2 SERPL-SCNC: 18 MEQ/L (ref 23–33)
CREAT SERPL-MCNC: 6.2 MG/DL (ref 0.4–1.2)
DEPRECATED RDW RBC AUTO: 49.3 FL (ref 35–45)
ERYTHROCYTE [DISTWIDTH] IN BLOOD BY AUTOMATED COUNT: 14 % (ref 11.5–14.5)
GFR SERPL CREATININE-BSD FRML MDRD: 7 ML/MIN/1.73M2
GLUCOSE SERPL-MCNC: 75 MG/DL (ref 70–108)
HCT VFR BLD AUTO: 32.5 % (ref 37–47)
HGB BLD-MCNC: 9.7 GM/DL (ref 12–16)
MCH RBC QN AUTO: 28.8 PG (ref 26–33)
MCHC RBC AUTO-ENTMCNC: 29.8 GM/DL (ref 32.2–35.5)
MCV RBC AUTO: 96.4 FL (ref 81–99)
PLATELET # BLD AUTO: 211 THOU/MM3 (ref 130–400)
PMV BLD AUTO: 11.4 FL (ref 9.4–12.4)
POTASSIUM SERPL-SCNC: 4.3 MEQ/L (ref 3.5–5.2)
PROT SERPL-MCNC: 5.4 G/DL (ref 6.1–8)
RBC # BLD AUTO: 3.37 MILL/MM3 (ref 4.2–5.4)
SODIUM SERPL-SCNC: 140 MEQ/L (ref 135–145)
WBC # BLD AUTO: 7.3 THOU/MM3 (ref 4.8–10.8)

## 2023-04-30 PROCEDURE — 85027 COMPLETE CBC AUTOMATED: CPT

## 2023-04-30 PROCEDURE — 99232 SBSQ HOSP IP/OBS MODERATE 35: CPT | Performed by: INTERNAL MEDICINE

## 2023-04-30 PROCEDURE — 6370000000 HC RX 637 (ALT 250 FOR IP): Performed by: INTERNAL MEDICINE

## 2023-04-30 PROCEDURE — 36415 COLL VENOUS BLD VENIPUNCTURE: CPT

## 2023-04-30 PROCEDURE — 99232 SBSQ HOSP IP/OBS MODERATE 35: CPT | Performed by: HOSPITALIST

## 2023-04-30 PROCEDURE — 6360000002 HC RX W HCPCS: Performed by: PHYSICIAN ASSISTANT

## 2023-04-30 PROCEDURE — 2580000003 HC RX 258: Performed by: PHYSICIAN ASSISTANT

## 2023-04-30 PROCEDURE — 6370000000 HC RX 637 (ALT 250 FOR IP): Performed by: PHYSICIAN ASSISTANT

## 2023-04-30 PROCEDURE — 80053 COMPREHEN METABOLIC PANEL: CPT

## 2023-04-30 PROCEDURE — G0378 HOSPITAL OBSERVATION PER HR: HCPCS

## 2023-04-30 RX ADMIN — CLOPIDOGREL BISULFATE 75 MG: 75 TABLET ORAL at 08:30

## 2023-04-30 RX ADMIN — SODIUM BICARBONATE 1300 MG: 650 TABLET ORAL at 22:13

## 2023-04-30 RX ADMIN — POLYETHYLENE GLYCOL (3350) 17 G: 17 POWDER, FOR SOLUTION ORAL at 08:30

## 2023-04-30 RX ADMIN — HEPARIN SODIUM 5000 UNITS: 5000 INJECTION INTRAVENOUS; SUBCUTANEOUS at 08:30

## 2023-04-30 RX ADMIN — DOCUSATE SODIUM 100 MG: 100 CAPSULE, LIQUID FILLED ORAL at 08:30

## 2023-04-30 RX ADMIN — BUPROPION HYDROCHLORIDE 200 MG: 100 TABLET, FILM COATED, EXTENDED RELEASE ORAL at 08:30

## 2023-04-30 RX ADMIN — BUPROPION HYDROCHLORIDE 200 MG: 100 TABLET, FILM COATED, EXTENDED RELEASE ORAL at 22:13

## 2023-04-30 RX ADMIN — HEPARIN SODIUM 5000 UNITS: 5000 INJECTION INTRAVENOUS; SUBCUTANEOUS at 22:13

## 2023-04-30 RX ADMIN — SODIUM BICARBONATE 1300 MG: 650 TABLET ORAL at 08:30

## 2023-04-30 RX ADMIN — MONTELUKAST SODIUM 10 MG: 10 TABLET ORAL at 08:29

## 2023-04-30 RX ADMIN — TRAZODONE HYDROCHLORIDE 50 MG: 50 TABLET ORAL at 22:13

## 2023-04-30 RX ADMIN — SODIUM CHLORIDE, PRESERVATIVE FREE 10 ML: 5 INJECTION INTRAVENOUS at 22:13

## 2023-04-30 RX ADMIN — ASPIRIN 81 MG 81 MG: 81 TABLET ORAL at 08:30

## 2023-04-30 RX ADMIN — ACETAMINOPHEN 650 MG: 325 TABLET ORAL at 08:30

## 2023-04-30 ASSESSMENT — PAIN SCALES - GENERAL: PAINLEVEL_OUTOF10: 2

## 2023-04-30 ASSESSMENT — PAIN DESCRIPTION - LOCATION: LOCATION: SHOULDER

## 2023-05-01 PROBLEM — R53.81 PHYSICAL DECONDITIONING: Status: ACTIVE | Noted: 2023-05-01

## 2023-05-01 LAB
ALBUMIN SERPL BCG-MCNC: 3.1 G/DL (ref 3.5–5.1)
ALP SERPL-CCNC: 68 U/L (ref 38–126)
ALT SERPL W/O P-5'-P-CCNC: 11 U/L (ref 11–66)
ANION GAP SERPL CALC-SCNC: 19 MEQ/L (ref 8–16)
AST SERPL-CCNC: 14 U/L (ref 5–40)
BILIRUB SERPL-MCNC: 0.2 MG/DL (ref 0.3–1.2)
BUN SERPL-MCNC: 70 MG/DL (ref 7–22)
CALCIUM SERPL-MCNC: 7.9 MG/DL (ref 8.5–10.5)
CHLORIDE SERPL-SCNC: 105 MEQ/L (ref 98–111)
CO2 SERPL-SCNC: 19 MEQ/L (ref 23–33)
CREAT SERPL-MCNC: 7.3 MG/DL (ref 0.4–1.2)
DEPRECATED RDW RBC AUTO: 47.4 FL (ref 35–45)
ERYTHROCYTE [DISTWIDTH] IN BLOOD BY AUTOMATED COUNT: 14 % (ref 11.5–14.5)
GFR SERPL CREATININE-BSD FRML MDRD: 6 ML/MIN/1.73M2
GLUCOSE SERPL-MCNC: 73 MG/DL (ref 70–108)
HBV CORE IGM SERPL QL IA: NEGATIVE
HBV SURFACE AB SER QL IA: NEGATIVE
HBV SURFACE AG SERPL QL IA: NEGATIVE
HCT VFR BLD AUTO: 29.1 % (ref 37–47)
HGB BLD-MCNC: 9.1 GM/DL (ref 12–16)
LV EF: 60 %
LVEF MODALITY: NORMAL
MCH RBC QN AUTO: 28.6 PG (ref 26–33)
MCHC RBC AUTO-ENTMCNC: 31.3 GM/DL (ref 32.2–35.5)
MCV RBC AUTO: 91.5 FL (ref 81–99)
PLATELET # BLD AUTO: 204 THOU/MM3 (ref 130–400)
PMV BLD AUTO: 11.2 FL (ref 9.4–12.4)
POTASSIUM SERPL-SCNC: 4.4 MEQ/L (ref 3.5–5.2)
PROT SERPL-MCNC: 5.2 G/DL (ref 6.1–8)
RBC # BLD AUTO: 3.18 MILL/MM3 (ref 4.2–5.4)
SODIUM SERPL-SCNC: 143 MEQ/L (ref 135–145)
WBC # BLD AUTO: 8.1 THOU/MM3 (ref 4.8–10.8)

## 2023-05-01 PROCEDURE — 86705 HEP B CORE ANTIBODY IGM: CPT

## 2023-05-01 PROCEDURE — 2580000003 HC RX 258: Performed by: PHYSICIAN ASSISTANT

## 2023-05-01 PROCEDURE — 36415 COLL VENOUS BLD VENIPUNCTURE: CPT

## 2023-05-01 PROCEDURE — 6370000000 HC RX 637 (ALT 250 FOR IP): Performed by: PHYSICIAN ASSISTANT

## 2023-05-01 PROCEDURE — 6370000000 HC RX 637 (ALT 250 FOR IP): Performed by: INTERNAL MEDICINE

## 2023-05-01 PROCEDURE — G0378 HOSPITAL OBSERVATION PER HR: HCPCS

## 2023-05-01 PROCEDURE — 1200000000 HC SEMI PRIVATE

## 2023-05-01 PROCEDURE — 90935 HEMODIALYSIS ONE EVALUATION: CPT

## 2023-05-01 PROCEDURE — 87340 HEPATITIS B SURFACE AG IA: CPT

## 2023-05-01 PROCEDURE — 99232 SBSQ HOSP IP/OBS MODERATE 35: CPT | Performed by: INTERNAL MEDICINE

## 2023-05-01 PROCEDURE — 86706 HEP B SURFACE ANTIBODY: CPT

## 2023-05-01 PROCEDURE — 6360000002 HC RX W HCPCS: Performed by: PHYSICIAN ASSISTANT

## 2023-05-01 PROCEDURE — 80053 COMPREHEN METABOLIC PANEL: CPT

## 2023-05-01 PROCEDURE — 99232 SBSQ HOSP IP/OBS MODERATE 35: CPT | Performed by: STUDENT IN AN ORGANIZED HEALTH CARE EDUCATION/TRAINING PROGRAM

## 2023-05-01 PROCEDURE — 85027 COMPLETE CBC AUTOMATED: CPT

## 2023-05-01 PROCEDURE — 93306 TTE W/DOPPLER COMPLETE: CPT

## 2023-05-01 RX ADMIN — POLYETHYLENE GLYCOL (3350) 17 G: 17 POWDER, FOR SOLUTION ORAL at 09:41

## 2023-05-01 RX ADMIN — BUPROPION HYDROCHLORIDE 200 MG: 100 TABLET, FILM COATED, EXTENDED RELEASE ORAL at 21:40

## 2023-05-01 RX ADMIN — SODIUM CHLORIDE, PRESERVATIVE FREE 5 ML: 5 INJECTION INTRAVENOUS at 21:40

## 2023-05-01 RX ADMIN — HEPARIN SODIUM 5000 UNITS: 5000 INJECTION INTRAVENOUS; SUBCUTANEOUS at 09:41

## 2023-05-01 RX ADMIN — CETIRIZINE HYDROCHLORIDE 5 MG: 5 TABLET ORAL at 09:34

## 2023-05-01 RX ADMIN — DOCUSATE SODIUM 100 MG: 100 CAPSULE, LIQUID FILLED ORAL at 09:41

## 2023-05-01 RX ADMIN — SODIUM BICARBONATE 1300 MG: 650 TABLET ORAL at 09:33

## 2023-05-01 RX ADMIN — ACETAMINOPHEN 650 MG: 325 TABLET ORAL at 15:41

## 2023-05-01 RX ADMIN — ASPIRIN 81 MG 81 MG: 81 TABLET ORAL at 09:41

## 2023-05-01 RX ADMIN — MONTELUKAST SODIUM 10 MG: 10 TABLET ORAL at 09:34

## 2023-05-01 RX ADMIN — HEPARIN SODIUM 5000 UNITS: 5000 INJECTION INTRAVENOUS; SUBCUTANEOUS at 21:40

## 2023-05-01 RX ADMIN — SODIUM CHLORIDE, PRESERVATIVE FREE 10 ML: 5 INJECTION INTRAVENOUS at 09:33

## 2023-05-01 RX ADMIN — CLOPIDOGREL BISULFATE 75 MG: 75 TABLET ORAL at 09:41

## 2023-05-01 RX ADMIN — ONDANSETRON 4 MG: 2 INJECTION INTRAMUSCULAR; INTRAVENOUS at 09:46

## 2023-05-01 RX ADMIN — TRAZODONE HYDROCHLORIDE 50 MG: 50 TABLET ORAL at 21:40

## 2023-05-01 RX ADMIN — BUPROPION HYDROCHLORIDE 200 MG: 100 TABLET, FILM COATED, EXTENDED RELEASE ORAL at 09:34

## 2023-05-01 ASSESSMENT — PAIN SCALES - GENERAL
PAINLEVEL_OUTOF10: 2
PAINLEVEL_OUTOF10: 1
PAINLEVEL_OUTOF10: 2
PAINLEVEL_OUTOF10: 5
PAINLEVEL_OUTOF10: 0
PAINLEVEL_OUTOF10: 2

## 2023-05-01 ASSESSMENT — PAIN DESCRIPTION - LOCATION: LOCATION: HEAD

## 2023-05-01 ASSESSMENT — PAIN DESCRIPTION - DESCRIPTORS: DESCRIPTORS: ACHING

## 2023-05-01 ASSESSMENT — PAIN - FUNCTIONAL ASSESSMENT: PAIN_FUNCTIONAL_ASSESSMENT: ACTIVITIES ARE NOT PREVENTED

## 2023-05-01 ASSESSMENT — PAIN DESCRIPTION - ORIENTATION: ORIENTATION: ANTERIOR

## 2023-05-01 NOTE — PLAN OF CARE
Problem: Discharge Planning  Goal: Discharge to home or other facility with appropriate resources  Outcome: Progressing  Flowsheets (Taken 5/1/2023 0930)  Discharge to home or other facility with appropriate resources:   Identify barriers to discharge with patient and caregiver   Arrange for needed discharge resources and transportation as appropriate   Identify discharge learning needs (meds, wound care, etc)   Refer to discharge planning if patient needs post-hospital services based on physician order or complex needs related to functional status, cognitive ability or social support system     Problem: Skin/Tissue Integrity  Goal: Absence of new skin breakdown  Description: 1. Monitor for areas of redness and/or skin breakdown  2. Assess vascular access sites hourly  3. Every 4-6 hours minimum:  Change oxygen saturation probe site  4. Every 4-6 hours:  If on nasal continuous positive airway pressure, respiratory therapy assess nares and determine need for appliance change or resting period. Outcome: Progressing     Problem: ABCDS Injury Assessment  Goal: Absence of physical injury  Outcome: Progressing     Problem: Safety - Adult  Goal: Free from fall injury  Outcome: Progressing     Problem: Neurosensory - Adult  Goal: Achieves stable or improved neurological status  Outcome: Progressing  Flowsheets (Taken 5/1/2023 0930)  Achieves stable or improved neurological status:   Assess for and report changes in neurological status   Initiate measures to prevent increased intracranial pressure   Maintain blood pressure and fluid volume within ordered parameters to optimize cerebral perfusion and minimize risk of hemorrhage   Monitor temperature, glucose, and sodium.  Initiate appropriate interventions as ordered  Goal: Achieves maximal functionality and self care  Outcome: Progressing     Problem: Skin/Tissue Integrity - Adult  Goal: Skin integrity remains intact  Outcome: Progressing  Flowsheets (Taken 5/1/2023

## 2023-05-01 NOTE — PROGRESS NOTES
[facility]   Nephrology progress note  5/1/2023, 1:51 PM      Pt Name:    Brad Maldonado  MRN:     [id number]     YOB: 1955  Admit Date:    4/28/2023 12:55 PM    Chief Complaint: Nephrology following for ESRD on HD . Subjective:  Patient seen and examined  No chest pain or shortness of breath  Feels okay better than at presentation  Says she is eating better    Objective:  24HR INTAKE/OUTPUT:    Intake/Output Summary (Last 24 hours) at 5/1/2023 1351  Last data filed at 5/1/2023 0354  Gross per 24 hour   Intake 642 ml   Output 700 ml   Net -58 ml      Admission weight: 102 lb 15.3 oz (46.7 kg)  Wt Readings from Last 3 Encounters:   05/01/23 113 lb 5.1 oz (51.4 kg)   04/24/23 103 lb (46.7 kg)   04/22/23 103 lb (46.7 kg)        Vitals :   Vitals:    05/01/23 0039 05/01/23 0354 05/01/23 0930 05/01/23 1130   BP: (!) 106/94 (!) 105/99 (!) 162/68 (!) 163/57   Pulse: 86 90 88 88   Resp: 14 14 16 18   Temp: 97.4 °F (36.3 °C) 97.7 °F (36.5 °C) 98.2 °F (36.8 °C) 98.3 °F (36.8 °C)   TempSrc: Oral Oral Oral    SpO2: 100% 100% 99% 94%   Weight:  98 lb 15.8 oz (44.9 kg)  113 lb 5.1 oz (51.4 kg)   Height:           Physical examination  General Appearance: cachectic and ill nourished . No distress  Mouth/Throat:  Oral mucosa moist  Neck:  no accessory muscle use  Lungs:  Breath sounds: clear  Heart[de-identified]  S1,S2 heard  Abdomen:  Soft, non - tender  Musculoskeletal:  Edema - none    Medications:  Infusion:    sodium chloride       Meds:    sodium bicarbonate  1,300 mg Oral BID    sodium chloride flush  5-40 mL IntraVENous 2 times per day    heparin (porcine)  5,000 Units SubCUTAneous Q12H    polyethylene glycol  17 g Oral Daily    aspirin  81 mg Oral Daily    buPROPion  200 mg Oral BID    clopidogrel  75 mg Oral Daily    montelukast  10 mg Oral Daily    traZODone  50 mg Oral Nightly       Lab Data :  CBC:   Recent Labs     04/29/23  0458 04/30/23  0555 05/01/23  0522   WBC 7.8 7.3 8.1   HGB 11.2*

## 2023-05-01 NOTE — FLOWSHEET NOTE
05/01/23 1500   Vital Signs   BP (!) 152/86   Temp 99 °F (37.2 °C)   Heart Rate 99   Resp 18   Weight 110 lb 3.7 oz (50 kg)   Weight Method Bed scale   Percent Weight Change -2.72   Pain Assessment   Pain Assessment None - Denies Pain   Post-Hemodialysis Assessment   Post-Treatment Procedures Blood returned;Catheter Capped, clamped with Saline x2 ports   Machine Disinfection Process Heat Disinfect;Acid/Vinegar Clean;Exterior Machine Disinfection   Rinseback Volume (ml) 400 ml   Blood Volume Processed (Liters) 52 l/min   Dialyzer Clearance Moderately streaked   Duration of Treatment (minutes) 180 minutes   Hemodialysis Output (ml) 1500 ml   Tolerated Treatment Good   Patient Response to Treatment lucille well   Bilateral Breath Sounds Clear   Edema None   Time Off 1443   Patient Disposition Return to room     Stable 3 hour treatment complete. Removed 1.5 liters of fluid as per order. Tolerated fluid removal well. HD catheter ports flushed, clamped and capped. Dressing clean, dry and intact. Report given to primary RN. Treatment record printed for scanning into EMR.

## 2023-05-01 NOTE — PLAN OF CARE
Problem: Discharge Planning  Goal: Discharge to home or other facility with appropriate resources  Outcome: Progressing  Flowsheets (Taken 5/1/2023 0132)  Discharge to home or other facility with appropriate resources: Identify barriers to discharge with patient and caregiver     Problem: Skin/Tissue Integrity  Goal: Absence of new skin breakdown  Description: 1. Monitor for areas of redness and/or skin breakdown  2. Assess vascular access sites hourly  3. Every 4-6 hours minimum:  Change oxygen saturation probe site  4. Every 4-6 hours:  If on nasal continuous positive airway pressure, respiratory therapy assess nares and determine need for appliance change or resting period. Outcome: Progressing  Note: No new skin issue. Problem: ABCDS Injury Assessment  Goal: Absence of physical injury  Outcome: Progressing  Flowsheets (Taken 5/1/2023 0132)  Absence of Physical Injury: Implement safety measures based on patient assessment     Problem: Safety - Adult  Goal: Free from fall injury  Outcome: Progressing  Flowsheets (Taken 5/1/2023 0132)  Free From Fall Injury:   Instruct family/caregiver on patient safety   Based on caregiver fall risk screen, instruct family/caregiver to ask for assistance with transferring infant if caregiver noted to have fall risk factors  Note: No fall throughout the shift. Assisted patient during bathroom time.      Problem: Neurosensory - Adult  Goal: Achieves stable or improved neurological status  Outcome: Progressing  Flowsheets (Taken 5/1/2023 0132)  Achieves stable or improved neurological status:   Assess for and report changes in neurological status   Initiate measures to prevent increased intracranial pressure

## 2023-05-01 NOTE — CARE COORDINATION
Case Management Assessment  Initial Evaluation    Date/Time of Evaluation: 5/1/2023 2:57 PM  Assessment Completed by: Scottie Dash RN    If patient is discharged prior to next notation, then this note serves as note for discharge by case management. Patient Name: Dedra Barba                   YOB: 1955  Diagnosis: End stage renal disease ([facility] [address] [N18.6]  Generalized weakness [R53.1]  Stage 4 chronic kidney disease ([facility] [address] [N18.4]  Physical deconditioning [R53.81]                   Date / Time: 4/28/2023 12:55 PM  Location: [address] [place] [address]     Patient Admission Status: Inpatient   Readmission Risk Low 0-14, Mod 15-19), High > 20: Readmission Risk Score: 20.5    Current PCP: Patricia Epperson MD  PCP verified by CM? Yes    Chart Reviewed: Yes      History Provided by: Patient  Patient Orientation: Alert and Oriented    Patient Cognition: Alert    Hospitalization in the last 30 days (Readmission):  No    If yes, Readmission Assessment in  Navigator will be completed. Advance Directives:      Code Status: Full Code   Patient's Primary Decision Maker is: Named in [address] [place] [address]    Primary Decision Maker: Desi Helms Spouse - [phone]    Discharge Planning:    Patient lives with: Spouse/Significant Other Type of Home: House  Primary Care Giver: Self  Patient Support Systems include: Spouse/Significant Other   Current Financial resources: Medicare  Current community resources: None  Current services prior to admission: Durable Medical Equipment            Current DME: Guille Chi (Comment) (grab bars in bathroom)            Type of Home Care services:  None    ADLS  Prior functional level: Independent in ADLs/IADLs  Current functional level: Independent in ADLs/IADLs    Family can provide assistance at DC: Yes  Would you like Case Management to discuss the discharge plan with any other family members/significant others, and if so, who?  No  Plans to Return to Present

## 2023-05-01 NOTE — PROGRESS NOTES
Hospitalist Progress Note      Patient: Toño Parent    Unit/Bed:6K-24/024-A  YOB: 1955  MRN: [id number]   Acct: [de-identified]   PCP: Julisa Eddy MD  Date of Admission: 4/28/2023    Assessment/Plan:    Physical debility with deconditioning  Reports she was too weak to attend her HD session on Friday and thus was brought to the hospital  PT/OT consult order placed today. Will need to discuss placement options if she is unable to care for herself at home  ESRD on HD  Will received HD today  Nephrology following  Outpatient schedule M/W/F  Nausea with poor oral intake, resolved  Reports eating better now  Elevated troponin  Likely due to ESRD. No evidence of acute ischemia  constipation  On glycolax  HFpEF, not in acute exacerbation  Volume status managed primarily with hemodialysis. Hx ischemic stroke  On antiplatelet therapy  Right wrist fracture  Currently in wrist splint. S/p fall 4/24, prior to hospitalization. Anxiety/depression  On wellburtin, trazodone    Disposition: awaiting PT/OT evaluations. Possible DC home vs. rehab    Chief Complaint: weakness. Hospital Course:    Per H&P - \"Deloris Sanford is a 69 y.o. female with a history of end-stage renal disease on dialysis, heart failure with preserved ejection fraction, history of ischemic stroke, allergic rhinitis, anxiety, and depression who presented to [facility] with chief complaint of fatigue. The patient states she had a fall on Monday and has since been feeling generally weak and fatigued. She also endorses ongoing nausea that she attributes to dialysis as well as fatigue. She undergoes dialysis Monday, Wednesday, and Friday. Immediately following dialysis, she reports becoming nauseated. The nausea persists through the following day. As such, she endorses poor oral intake. She denies any associated abdominal pain, vomiting, or diarrhea.   She does endorse

## 2023-05-02 VITALS
DIASTOLIC BLOOD PRESSURE: 53 MMHG | HEIGHT: 60 IN | BODY MASS INDEX: 21.64 KG/M2 | RESPIRATION RATE: 15 BRPM | HEART RATE: 98 BPM | SYSTOLIC BLOOD PRESSURE: 130 MMHG | OXYGEN SATURATION: 100 % | WEIGHT: 110.23 LBS | TEMPERATURE: 98.1 F

## 2023-05-02 PROCEDURE — 6370000000 HC RX 637 (ALT 250 FOR IP): Performed by: PHYSICIAN ASSISTANT

## 2023-05-02 PROCEDURE — 97110 THERAPEUTIC EXERCISES: CPT

## 2023-05-02 PROCEDURE — 97530 THERAPEUTIC ACTIVITIES: CPT

## 2023-05-02 PROCEDURE — 99232 SBSQ HOSP IP/OBS MODERATE 35: CPT | Performed by: INTERNAL MEDICINE

## 2023-05-02 PROCEDURE — 2580000003 HC RX 258: Performed by: PHYSICIAN ASSISTANT

## 2023-05-02 PROCEDURE — 97162 PT EVAL MOD COMPLEX 30 MIN: CPT

## 2023-05-02 PROCEDURE — 6360000002 HC RX W HCPCS: Performed by: PHYSICIAN ASSISTANT

## 2023-05-02 PROCEDURE — 97116 GAIT TRAINING THERAPY: CPT

## 2023-05-02 PROCEDURE — 99239 HOSP IP/OBS DSCHRG MGMT >30: CPT | Performed by: STUDENT IN AN ORGANIZED HEALTH CARE EDUCATION/TRAINING PROGRAM

## 2023-05-02 RX ADMIN — CETIRIZINE HYDROCHLORIDE 5 MG: 5 TABLET ORAL at 08:59

## 2023-05-02 RX ADMIN — CLOPIDOGREL BISULFATE 75 MG: 75 TABLET ORAL at 08:59

## 2023-05-02 RX ADMIN — ASPIRIN 81 MG 81 MG: 81 TABLET ORAL at 08:59

## 2023-05-02 RX ADMIN — MONTELUKAST SODIUM 10 MG: 10 TABLET ORAL at 08:59

## 2023-05-02 RX ADMIN — HEPARIN SODIUM 5000 UNITS: 5000 INJECTION INTRAVENOUS; SUBCUTANEOUS at 08:59

## 2023-05-02 RX ADMIN — BUPROPION HYDROCHLORIDE 200 MG: 100 TABLET, FILM COATED, EXTENDED RELEASE ORAL at 08:59

## 2023-05-02 RX ADMIN — POLYETHYLENE GLYCOL (3350) 17 G: 17 POWDER, FOR SOLUTION ORAL at 09:43

## 2023-05-02 RX ADMIN — SODIUM CHLORIDE, PRESERVATIVE FREE 10 ML: 5 INJECTION INTRAVENOUS at 09:01

## 2023-05-02 RX ADMIN — ONDANSETRON 4 MG: 4 TABLET, ORALLY DISINTEGRATING ORAL at 12:13

## 2023-05-02 ASSESSMENT — PAIN SCALES - GENERAL: PAINLEVEL_OUTOF10: 0

## 2023-05-02 NOTE — PROGRESS NOTES
Physician Progress Note      PATIENT:               Cary Hagan  CSN #:                  [id number]  :                       1955  ADMIT DATE:       2023 12:55 PM  [address] [place] DATE:  Celestino Stewart  PROVIDER #:        Portillo Lynch DO          QUERY TEXT:    Dr Suze Zimmer,    Pt admitted with fatigue & weakness with ESRD on dialysis. Per  note:   Physical debility with deconditioning. If possible, please document/ further   specify in the progress notes and discharge summary if you are evaluating and   / or treating any of the following: The medical record reflects the following:  Risk Factors: ESRD on dialysis, frequent falls  Clinical Indicators: Reports she was too weak to attend her HD session on   Friday and thus was brought to the hospital. Right wrist fracture Diagnosed on   2023 following a mechanical fall. Treatment: PT/OT consult order placed today. Will need to discuss placement   options if she is unable to care for herself at home    Rodolfo Goldstein RN  Options provided:  -- Physical debility with deconditioning related to Age Related Physical   Debility- POA  -- Physical debility with deconditioning related to Frailty  -- Physical debility with deconditioning due to other, Please document other   cause. -- Other - I will add my own diagnosis  -- Disagree - Not applicable / Not valid  -- Disagree - Clinically unable to determine / Unknown  -- Refer to Clinical Documentation Reviewer    PROVIDER RESPONSE TEXT:    This patient has Physical debility with deconditioning related to frailty.     Query created by: Jane Kelly on 2023 7:11 AM      Electronically signed by:  Portillo Lynch DO 2023 9:17 AM

## 2023-05-02 NOTE — CARE COORDINATION
5/2/23, 3:51 PM EDT    Patient goals/plan/ treatment preferences discussed by  and . Patient goals/plan/ treatment preferences reviewed with patient/ family. Patient/ family verbalize understanding of discharge plan and are in agreement with goal/plan/treatment preferences. Understanding was demonstrated using the teach back method. AVS provided by RN at time of discharge, which includes all necessary medical information pertaining to the patients current course of illness, treatment, post-discharge goals of care, and treatment preferences.      Services At/After Discharge: Home Health, Aide services, Nursing service, OT, and PT    [facility]       IMM Letter  IMM Letter given to Patient/Family/Significant other/Guardian/POA/by[de-identified] Lennie Cagle RN CM  IMM Letter date given[de-identified] 05/01/23  IMM Letter time given[de-identified] 80  Observation Status Letter date given[de-identified] 04/28/23  Observation Status Letter time given[de-identified] 5  Observation Status Letter given to Patient/Family/Significant other/Guardian/POA/by[de-identified] Staff

## 2023-05-02 NOTE — PROGRESS NOTES
Virtual nurse completed review of discharge AVS with patient and family. Review included medications to stop taking, medications to ask your PCP about, follow up appointments & Next doses due.

## 2023-05-02 NOTE — PROGRESS NOTES
[facility]  INPATIENT PHYSICAL THERAPY  EVALUATION  [facility] RENAL TELEMETRY 6K - 2L-89/766-V    Time In: 1109  Time Out: 3702  Timed Code Treatment Minutes: 25 Minutes  Minutes: 35          Date: 2023  Patient Name: Ann Marie Lawrence,  Gender:  female        MRN: [id number]  : 1955  (69 y.o.)      Referring Practitioner: Martinez King DO  Diagnosis: End stage renal disease  Additional Pertinent Hx: Ann Marie Lawrence is a 69 y.o. female with a history of end-stage renal disease on dialysis, heart failure with preserved ejection fraction, history of ischemic stroke, allergic rhinitis, anxiety, and depression who presented to [facility] with chief complaint of fatigue. The patient states she had a fall on Monday and has since been feeling generally weak and fatigued. She also endorses ongoing nausea that she attributes to dialysis as well as fatigue. She undergoes dialysis Monday, Wednesday, and Friday. Immediately following dialysis, she reports becoming nauseated. She states this started following the fall that she had on Monday. She denies any substernal chest pain, chest pressure, or chest tightness. She has no history of coronary artery disease. She was recently seen in the emergency department on Monday for a wrist fracture. She is scheduled to see orthopedics on an outpatient basis. Restrictions/Precautions:  Restrictions/Precautions: Fall Risk    Subjective:  Chart Reviewed: Yes  Patient assessed for rehabilitation services?: Yes  Family / Caregiver Present: Yes  Subjective: Pt is supine in bed with  present, agreeable to PT. Extensive time spent on education and home safety.     General:  Overall Orientation Status: Within Functional Limits  Vision: Within Functional Limits  Hearing: Within functional limits       Pain: denies    Vitals: Vitals not assessed per clinical judgement, see nursing flowsheet    Social/Functional History:    Lives With: Spouse  Type of Home:

## 2023-05-02 NOTE — PROGRESS NOTES
[facility]   Nephrology progress note  5/2/2023, 10:00 AM      Pt Name:    Isabell Golden  MRN:     [id number]     YOB: 1955  Admit Date:    4/28/2023 12:55 PM    Chief Complaint: Nephrology following for ESRD on HD . Subjective:  Patient seen and examined  No chest pain or shortness of breath  Feels better than when she came in. Making slightly better    Objective:  24HR INTAKE/OUTPUT:    Intake/Output Summary (Last 24 hours) at 5/2/2023 1000  Last data filed at 5/1/2023 2356  Gross per 24 hour   Intake 500 ml   Output 1500 ml   Net -1000 ml        Admission weight: 102 lb 15.3 oz (46.7 kg)  Wt Readings from Last 3 Encounters:   05/01/23 110 lb 3.7 oz (50 kg)   04/24/23 103 lb (46.7 kg)   04/22/23 103 lb (46.7 kg)        Vitals :   Vitals:    05/01/23 2130 05/01/23 2356 05/02/23 0340 05/02/23 0858   BP: 135/67 134/63 131/62 (!) 130/53   Pulse: 91 93 88 98   Resp: 16 17 17 15   Temp: 98.1 °F (36.7 °C) 98.1 °F (36.7 °C) 98.1 °F (36.7 °C) 98.1 °F (36.7 °C)   TempSrc: Oral Oral Oral Oral   SpO2: 92% 93% 100% 100%   Weight:       Height:           Physical examination  General Appearance: cachectic and ill nourished . No distress  Mouth/Throat:  Oral mucosa moist  Neck:  no accessory muscle use  Lungs:  Breath sounds: clear  Heart[de-identified]  S1,S2 heard  Abdomen:  Soft, non - tender  Musculoskeletal:  Edema - none    Medications:  Infusion:    sodium chloride       Meds:    sodium chloride flush  5-40 mL IntraVENous 2 times per day    heparin (porcine)  5,000 Units SubCUTAneous Q12H    polyethylene glycol  17 g Oral Daily    aspirin  81 mg Oral Daily    buPROPion  200 mg Oral BID    clopidogrel  75 mg Oral Daily    montelukast  10 mg Oral Daily    traZODone  50 mg Oral Nightly       Lab Data :  CBC:   Recent Labs     04/30/23  0555 05/01/23  0522   WBC 7.3 8.1   HGB 9.7* 9.1*   HCT 32.5* 29.1*    204       CMP:  Recent Labs     04/30/23  0555 05/01/23  0522    143   K 4.3 4.4

## 2023-05-02 NOTE — DISCHARGE SUMMARY
Hospitalist Discharge Summary        Patient: Bryn Sharma  YOB: 1955  MRN: [id number]   Acct: [de-identified]    Primary Care Physician: Lupe Garcia MD    Admit date  4/28/2023    Discharge date:  5/2/2023 11:51 AM    Chief Complaint on presentation :-    Weakness, fatigue    Discharge Assessment and Plan:-   Physical debility with deconditioning  Evaluated by PT/OT. Planning for home RN/PT/OT at discharge. Patient and family in agreement. Chronic conditions:  ESRD on HD  Poor oral intake  Elevated troponin  Constipation  HFpEF, not in acute exacerbation  Hx ischemic stroke  Right wrist fracture  Anxiety/depression    Initial H and P and Hospital course:-  Patient presented to hospital after a bout of weakness and fatigue at home. He is unable to make her outpatient hemodialysis schedule called the squad to bring her to the hospital for evaluation. While in the hospital, she was seen by the physical therapy team and evaluated. She reported having some improvement in her strength and weakness and was okay with discharge home under the care of home health care with nursing and therapy support. I discussed therapy options with the patient and her  including inpatient care, and they ultimately decided to go home with home health services. She was seen by nephrology throughout her hospital stay to manage dialysis needs it is okay to resume her home dialysis schedule upon discharge.     Physical Exam:-  Vitals:   Patient Vitals for the past 24 hrs:   BP Temp Temp src Pulse Resp SpO2 Weight   05/02/23 0858 (!) 130/53 98.1 °F (36.7 °C) Oral 98 15 100 % --   05/02/23 0340 131/62 98.1 °F (36.7 °C) Oral 88 17 100 % --   05/01/23 2356 134/63 98.1 °F (36.7 °C) Oral 93 17 93 % --   05/01/23 2130 135/67 98.1 °F (36.7 °C) Oral 91 16 92 % --   05/01/23 1514 129/73 97.8 °F (36.6 °C) Oral 99 18 99 % --   05/01/23 1500 (!) 152/86 99 °F (37.2 °C) -- 99 18 -- 110 lb 3.7 oz (50 kg)

## 2023-05-02 NOTE — PLAN OF CARE
Problem: Skin/Tissue Integrity - Adult  Goal: Skin integrity remains intact  5/1/2023 2150 by Manuel Marquez RN  Flowsheets (Taken 5/1/2023 2150)  Skin Integrity Remains Intact: Monitor for areas of redness and/or skin breakdown  5/1/2023 1759 by Jorge Alberto Guillermo RN  Outcome: Progressing  Flowsheets (Taken 5/1/2023 0930)  Skin Integrity Remains Intact:   Monitor for areas of redness and/or skin breakdown   Assess vascular access sites hourly

## 2023-05-02 NOTE — PLAN OF CARE
Problem: Discharge Planning  Goal: Discharge to home or other facility with appropriate resources  Outcome: Progressing  Flowsheets (Taken 5/2/2023 0900)  Discharge to home or other facility with appropriate resources: Identify barriers to discharge with patient and caregiver     Problem: Skin/Tissue Integrity  Goal: Absence of new skin breakdown  Description: 1. Monitor for areas of redness and/or skin breakdown  2. Assess vascular access sites hourly  3. Every 4-6 hours minimum:  Change oxygen saturation probe site  4. Every 4-6 hours:  If on nasal continuous positive airway pressure, respiratory therapy assess nares and determine need for appliance change or resting period.   Outcome: Progressing     Problem: ABCDS Injury Assessment  Goal: Absence of physical injury  Outcome: Progressing  Flowsheets (Taken 5/2/2023 0900)  Absence of Physical Injury: Implement safety measures based on patient assessment     Problem: Safety - Adult  Goal: Free from fall injury  Outcome: Progressing  Flowsheets (Taken 5/2/2023 0900)  Free From Fall Injury: Instruct family/caregiver on patient safety     Problem: Neurosensory - Adult  Goal: Achieves stable or improved neurological status  Outcome: Progressing  Flowsheets (Taken 5/2/2023 0900)  Achieves stable or improved neurological status: Assess for and report changes in neurological status  Goal: Achieves maximal functionality and self care  Outcome: Progressing  Flowsheets (Taken 5/2/2023 0900)  Achieves maximal functionality and self care: Monitor swallowing and airway patency with patient fatigue and changes in neurological status     Problem: Skin/Tissue Integrity - Adult  Goal: Skin integrity remains intact  5/2/2023 1141 by Bryant Ornelas RN  Outcome: Progressing  Flowsheets (Taken 5/2/2023 0900)  Skin Integrity Remains Intact: Monitor for areas of redness and/or skin breakdown  5/1/2023 2150 by Hugh Blair RN  Flowsheets (Taken 5/1/2023 2150)  Skin Integrity

## 2023-05-03 ENCOUNTER — CLINICAL DOCUMENTATION ONLY (OUTPATIENT)
Facility: CLINIC | Age: 68
End: 2023-05-03

## 2023-05-03 ENCOUNTER — TELEPHONE (OUTPATIENT)
Dept: FAMILY MEDICINE CLINIC | Age: 68
End: 2023-05-03

## 2023-05-03 NOTE — TELEPHONE ENCOUNTER
Care Transitions Initial Follow Up Call    Outreach made within 2 business days of discharge: Yes    Patient: Israel Mauricio Patient : 1955   MRN: [id number]  Reason for Admission: There are no discharge diagnoses documented for the most recent discharge. Discharge Date: 23       Spoke with: Left message for patient to call office back. Discharge department/facility:     [facility] Interactive Patient Contact:  Was patient able to fill all prescriptions:   Was patient instructed to bring all medications to the follow-up visit:   Is patient taking all medications as directed in the discharge summary?    Does patient understand their discharge instructions:   Does patient have questions or concerns that need addressed prior to 7-14 day follow up office visit:     Scheduled appointment with PCP within 7-14 days    Follow Up  Future Appointments   Date Time Provider Jaya Chapman   2023  1:45 PM Maximo Rios MD N SRPX CT/CV [facility]   2023 11:15 AM Cadence Butler MD [facility]   2023  1:00 PM Dorethia Kehr, PhD N [id number] [address] [place] [address]   10/30/2023  1:15 PM Vic Tian MD N HAYLEE Heart [facility] Lizy Bobby LPN

## 2023-05-04 NOTE — TELEPHONE ENCOUNTER
Please see msg below in the questions and concerns area. Pt was concerned about pulled muscles in her back. She wanted to know what she could do to relieve the pain. Tylenol is not helping.

## 2023-05-04 NOTE — TELEPHONE ENCOUNTER
I would like to avoid medications if possible since her kidneys are not doing well.   I'd try a heating pad, IcyHot, etc.

## 2023-05-04 NOTE — TELEPHONE ENCOUNTER
Care Transitions Initial Follow Up Call    Outreach made within 2 business days of discharge: Yes    Patient: Brian Moody Patient : 1955   MRN: [id number]  Reason for Admission: There are no discharge diagnoses documented for the most recent discharge. Discharge Date: 23       Spoke with: Deloris     Discharge department/facility: [facility] to home    TCM Interactive Patient Contact:  Was patient able to fill all prescriptions: No: Pills did not need filled  Was patient instructed to bring all medications to the follow-up visit: Yes  Is patient taking all medications as directed in the discharge summary? Yes  Does patient understand their discharge instructions: Yes  Does patient have questions or concerns that need addressed prior to 7-14 day follow up office visit: yes- Patient is concerned about the pulled muscles in her back. She wanted to know what she could do to relieve the pain. Tylenol is not helping. Icing does help some.      Scheduled appointment with PCP within 7-14 days    Follow Up  Future Appointments   Date Time Provider Jaya Chapman   2023  1:45 PM Jonathan Wing [facility] [place]   2023 11:15 AM [facility] [place]   2023  1:00 PM Jacqualyn Gilford, PhD N [id number] [facility]   10/30/2023  1:15 PM Silvia Giron MD N [address] [facility] [place]

## 2023-05-10 ENCOUNTER — TELEPHONE (OUTPATIENT)
Dept: FAMILY MEDICINE CLINIC | Age: 68
End: 2023-05-10

## 2023-05-10 ENCOUNTER — HOSPITAL ENCOUNTER (OUTPATIENT)
Age: 68
Discharge: HOME | End: 2023-05-10
Payer: MEDICARE

## 2023-05-10 DIAGNOSIS — R53.83 OTHER FATIGUE: ICD-10-CM

## 2023-05-10 DIAGNOSIS — R11.0 NAUSEA: ICD-10-CM

## 2023-05-10 DIAGNOSIS — D64.9 LOW HEMOGLOBIN: ICD-10-CM

## 2023-05-10 DIAGNOSIS — D64.9 LOW HEMOGLOBIN: Primary | ICD-10-CM

## 2023-05-10 LAB
ERYTHROCYTE [DISTWIDTH] IN BLOOD BY AUTOMATED COUNT: 15.1 % (ref 11.5–14.5)
HCT VFR BLD AUTO: 32.1 % (ref 37–47)
HGB BLD-MCNC: 10.2 GM/DL (ref 12–16)
MCH RBC QN AUTO: 29.1 PG (ref 27–31)
MCHC RBC AUTO-ENTMCNC: 31.8 GM/DL (ref 33–37)
MCV RBC AUTO: 92 FL (ref 81–99)
PLATELET # BLD AUTO: 375 THOU/MM3 (ref 130–400)
PMV BLD AUTO: 10.1 FL (ref 7.4–10.4)
RBC # BLD AUTO: 3.51 MILL/MM3 (ref 4.2–5.4)
WBC # BLD AUTO: 7.3 THOU/MM3 (ref 4.8–10.8)

## 2023-05-10 PROCEDURE — 36415 COLL VENOUS BLD VENIPUNCTURE: CPT

## 2023-05-10 PROCEDURE — 85027 COMPLETE CBC AUTOMATED: CPT

## 2023-05-10 NOTE — TELEPHONE ENCOUNTER
Patient called stating she has had small black stools for the last few days along with fatigue, nausea, and previous low hemoglobin on 5/1/23. Per pcp, stat cbc to be drawn today. Orders placed.

## 2023-05-15 ENCOUNTER — TELEPHONE (OUTPATIENT)
Dept: FAMILY MEDICINE CLINIC | Age: 68
End: 2023-05-15

## 2023-05-15 NOTE — TELEPHONE ENCOUNTER
Care Transitions Initial Follow Up Call    Outreach made within 2 business days of discharge: Yes    Patient: Rupesh Duong Patient : 1955   MRN: [id number]  Reason for Admission: There are no discharge diagnoses documented for the most recent discharge. Discharge Date: 23       Spoke with:      Discharge department/facility: [facility]     TCM Interactive Patient Contact:  Was patient able to fill all prescriptions: Yes  Was patient instructed to bring all medications to the follow-up visit: Yes  Is patient taking all medications as directed in the discharge summary? Yes  Does patient understand their discharge instructions: Yes  Does patient have questions or concerns that need addressed prior to 7-14 day follow up office visit: yes-per  patient was restarted on iron by GI.  would like to discuss at upcoming appt.      Scheduled appointment with PCP within 7-14 days    Follow Up  Future Appointments   Date Time Provider Jaya Chapman   2023 11:00 AM MD [facility] [place]   2023  1:45 PM MD Andrew Chong CT/CV [facility] KATHRE[facility] [place]   2023 11:15 AM MD Lee Willard [facility] K[facility] [place]   2023  1:00 PM Moraima Rodriguez, PhD N [id number] [facility]   10/30/2023  1:15 PM Betzy Gaffney MD N SRPX Heart [facility] Mathias [address], [facility] ([address] [place] [address])

## 2023-05-15 NOTE — TELEPHONE ENCOUNTER
called to schedule hospital f/u. Appt 5/17/23 at 11am.    [address] was at patient's home at time of call. [facility] patient's BP was 168/92, pulse 70, asymptomatic. Nurse did state that patient's BP bottomed out at dialysis and was put on O2. Per  patient is not currently taking any blood pressure medications.

## 2023-05-15 NOTE — TELEPHONE ENCOUNTER
states he sent BP reading with patient to dialysis.    states patient is going to be starting home dialysis

## 2023-05-16 NOTE — PROGRESS NOTES
[address]  [address]  Phone:  [phone]     Post-Discharge Transitional Care  Follow Up      Stephanie Fu   YOB: 1955    Date of Office Visit:  5/17/2023  Date of Hospital Admission: 4/28/23  Date of Hospital Discharge: 5/2/23  Risk of hospital readmission (high >=14%. Medium >=10%) :Readmission Risk Score: 19.5    Care management risk score Rising risk (score 2-5) and Complex Care (Scores >=6): No Risk Score On File     Non face to face  following discharge, date last encounter closed (first attempt may have been earlier): 05/15/2023    Call initiated 2 business days of discharge: Yes    Inpatient course: Discharge summary reviewed- see chart. Interval history/Current status: See below. ASSESSMENT/PLAN:     Hospital discharge follow-up  [facility] records, labs, and imaging were reviewed and are summarized below. -     AK DISCHARGE MEDS RECONCILED W/ CURRENT OUTPATIENT MED LIST    Generalized weakness         -    This was likely from dehydration and has improved. Continue with home PT/OT. End stage renal disease ([address]        -     She will be changing from HD to peritoneal dialysis so will follow with Dr. Rachelle Reza as directed. Chronic diastolic CHF (congestive heart failure) (HCC)        -     Her echocardiogram on 4/28/23 showed an EF of 60% with grade 1 diastolic dysfunction. Continue on current medications. Medical Decision Making: high complexity    Return in about 3 months (around 8/17/2023) for anxiety, hypertension. Subjective: Greg Almanza is a 68 yo female who presents today for transition of care. She was hospitalized at [facility] from 4/28/23-5/2/23. Hospital records, labs, and imaging were reviewed and are summarized below. She has a complex history including ESRD on hemodialysis M/W/F, heart failure, and ischemic stroke. On 4/28 she presented to the ER with weakness and fatigue at home.   She was

## 2023-05-17 ENCOUNTER — OFFICE VISIT (OUTPATIENT)
Dept: FAMILY MEDICINE CLINIC | Age: 68
End: 2023-05-17
Payer: MEDICARE

## 2023-05-17 VITALS
OXYGEN SATURATION: 99 % | HEART RATE: 100 BPM | WEIGHT: 101 LBS | TEMPERATURE: 97.4 F | DIASTOLIC BLOOD PRESSURE: 76 MMHG | BODY MASS INDEX: 19.83 KG/M2 | SYSTOLIC BLOOD PRESSURE: 122 MMHG | HEIGHT: 60 IN | RESPIRATION RATE: 20 BRPM

## 2023-05-17 DIAGNOSIS — N18.6 END STAGE RENAL DISEASE (HCC): ICD-10-CM

## 2023-05-17 DIAGNOSIS — I50.32 CHRONIC DIASTOLIC CHF (CONGESTIVE HEART FAILURE) (HCC): ICD-10-CM

## 2023-05-17 DIAGNOSIS — R53.1 GENERALIZED WEAKNESS: ICD-10-CM

## 2023-05-17 DIAGNOSIS — Z09 HOSPITAL DISCHARGE FOLLOW-UP: Primary | ICD-10-CM

## 2023-05-17 PROCEDURE — 1111F DSCHRG MED/CURRENT MED MERGE: CPT | Performed by: FAMILY MEDICINE

## 2023-05-17 PROCEDURE — 1123F ACP DISCUSS/DSCN MKR DOCD: CPT | Performed by: FAMILY MEDICINE

## 2023-05-17 PROCEDURE — 1036F TOBACCO NON-USER: CPT | Performed by: FAMILY MEDICINE

## 2023-05-17 PROCEDURE — 3017F COLORECTAL CA SCREEN DOC REV: CPT | Performed by: FAMILY MEDICINE

## 2023-05-17 PROCEDURE — 3074F SYST BP LT 130 MM HG: CPT | Performed by: FAMILY MEDICINE

## 2023-05-17 PROCEDURE — G8399 PT W/DXA RESULTS DOCUMENT: HCPCS | Performed by: FAMILY MEDICINE

## 2023-05-17 PROCEDURE — 3078F DIAST BP <80 MM HG: CPT | Performed by: FAMILY MEDICINE

## 2023-05-17 PROCEDURE — 99214 OFFICE O/P EST MOD 30 MIN: CPT | Performed by: FAMILY MEDICINE

## 2023-05-17 PROCEDURE — 1090F PRES/ABSN URINE INCON ASSESS: CPT | Performed by: FAMILY MEDICINE

## 2023-05-17 PROCEDURE — G8427 DOCREV CUR MEDS BY ELIG CLIN: HCPCS | Performed by: FAMILY MEDICINE

## 2023-05-17 PROCEDURE — G8420 CALC BMI NORM PARAMETERS: HCPCS | Performed by: FAMILY MEDICINE

## 2023-05-17 RX ORDER — BUPROPION HYDROCHLORIDE 150 MG/1
TABLET ORAL
Qty: 90 TABLET | Refills: 1 | Status: SHIPPED | OUTPATIENT
Start: 2023-05-17

## 2023-05-17 RX ORDER — BUPROPION HYDROCHLORIDE 300 MG/1
TABLET ORAL
Qty: 90 TABLET | Refills: 1 | Status: SHIPPED | OUTPATIENT
Start: 2023-05-17

## 2023-05-17 RX ORDER — FERROUS SULFATE 325(65) MG
1 TABLET ORAL 2 TIMES DAILY
COMMUNITY
Start: 2023-05-11

## 2023-05-17 RX ORDER — SEVELAMER CARBONATE 800 MG/1
TABLET, FILM COATED ORAL
COMMUNITY
Start: 2023-05-01

## 2023-05-17 RX ORDER — DOCUSATE SODIUM 100 MG/1
100 CAPSULE, LIQUID FILLED ORAL 2 TIMES DAILY
COMMUNITY

## 2023-05-31 ENCOUNTER — TELEPHONE (OUTPATIENT)
Dept: FAMILY MEDICINE CLINIC | Age: 68
End: 2023-05-31

## 2023-05-31 NOTE — TELEPHONE ENCOUNTER
----- Message from She Moser sent at 5/31/2023 12:04 PM EDT -----  Subject: Message to Provider    QUESTIONS  Information for Provider? Called to advise that the pt fell from her bed   last night. She said that she was reaching over for her socks and she slid   off the bed and fell on her behind. Pt advised she is not hurt but the   home health wanted to advise.   ---------------------------------------------------------------------------  --------------  Afia Moses INFO  [phone]; OK to leave message on voicemail  ---------------------------------------------------------------------------  --------------  SCRIPT ANSWERS  Relationship to Patient? Covered Entity  Covered Entity Type? Home Health Care? Representative Name?  [address] [facility]

## 2023-06-01 DIAGNOSIS — E78.00 PURE HYPERCHOLESTEROLEMIA: ICD-10-CM

## 2023-06-01 RX ORDER — SIMVASTATIN 20 MG
TABLET ORAL
Qty: 90 TABLET | Refills: 3 | Status: SHIPPED | OUTPATIENT
Start: 2023-06-01

## 2023-06-01 NOTE — TELEPHONE ENCOUNTER
Last visit- 5/17/2023  Next visit- 6/12/2023    Requested Prescriptions     Pending Prescriptions Disp Refills    simvastatin (ZOCOR) 20 MG tablet 90 tablet 3     Sig: TAKE 1 TABLET BY MOUTH DAILY

## 2023-06-06 ENCOUNTER — HOSPITAL ENCOUNTER (OUTPATIENT)
Dept: INTERVENTIONAL RADIOLOGY/VASCULAR | Age: 68
Discharge: HOME | End: 2023-06-06

## 2023-06-06 RX ORDER — MIDAZOLAM HYDROCHLORIDE 1 MG/ML
1 INJECTION INTRAMUSCULAR; INTRAVENOUS ONCE
Status: DISCONTINUED | OUTPATIENT
Start: 2023-06-06 | End: 2023-06-06

## 2023-06-06 RX ORDER — SODIUM CHLORIDE 450 MG/100ML
INJECTION, SOLUTION INTRAVENOUS CONTINUOUS
Status: DISCONTINUED | OUTPATIENT
Start: 2023-06-06 | End: 2023-06-06

## 2023-06-06 RX ORDER — BUPIVACAINE HYDROCHLORIDE 2.5 MG/ML
10 INJECTION, SOLUTION EPIDURAL; INFILTRATION; INTRACAUDAL ONCE
Status: DISCONTINUED | OUTPATIENT
Start: 2023-06-06 | End: 2023-06-06

## 2023-06-06 NOTE — PROGRESS NOTES
0800: Patient arrived in wheelchair with  for peritoneal dialysis catheter. Patient states she has been off aspirin and plavix for 5 days. Patient also voiced that she hasn't had a bowel movement in 6 days and last bowel movement was very hard and she had to strain a lot.  states she has been having constipation issues since her stroke.  voiced a lot of concern about having the catheter placed with her bowel issues. [facility] and she spoke with Dr. Mingo Fu who stated to cancel procedure today and start lactulose. Robyn called  and told him she will call him in one week to re-evaluate. AVS reviewed with patient, voiced understanding. Patient discharged in wheelchair. reduce PF to qd OD.

## 2023-06-06 NOTE — DISCHARGE INSTRUCTIONS
ACTIVITY:  Continue usual care with your doctor. Call your doctor immediately if any severe problems or go to the nearest emergency room. I have been treated and hereby acknowledge receiving this instruction sheet. Please  lactulose medication from [facility] and start taking right away. Can reschedule procedure in a few weeks if having bowel movements.

## 2023-06-08 ENCOUNTER — TELEPHONE (OUTPATIENT)
Dept: FAMILY MEDICINE CLINIC | Age: 68
End: 2023-06-08

## 2023-06-08 NOTE — TELEPHONE ENCOUNTER
[address] office is asking for the okay to do routine dental work, such as cleaning.  They wanted clearance due to her recent change in medical history    Fax response to them at  [phone]

## 2023-06-23 ENCOUNTER — HOSPITAL ENCOUNTER (EMERGENCY)
Age: 68
Discharge: HOME | End: 2023-06-23
Attending: EMERGENCY MEDICINE
Payer: MEDICARE

## 2023-06-23 VITALS
TEMPERATURE: 98.1 F | RESPIRATION RATE: 16 BRPM | BODY MASS INDEX: 19.24 KG/M2 | WEIGHT: 98 LBS | HEART RATE: 92 BPM | OXYGEN SATURATION: 99 % | HEIGHT: 60 IN | DIASTOLIC BLOOD PRESSURE: 65 MMHG | SYSTOLIC BLOOD PRESSURE: 131 MMHG

## 2023-06-23 DIAGNOSIS — K59.04 CHRONIC IDIOPATHIC CONSTIPATION: Primary | ICD-10-CM

## 2023-06-23 PROCEDURE — 99283 EMERGENCY DEPT VISIT LOW MDM: CPT

## 2023-06-23 RX ORDER — POLYETHYLENE GLYCOL 3350 17 G/17G
17 POWDER, FOR SOLUTION ORAL DAILY
Qty: 1530 G | Refills: 1 | Status: SHIPPED | OUTPATIENT
Start: 2023-06-23 | End: 2023-07-23

## 2023-06-23 RX ORDER — SENNA AND DOCUSATE SODIUM 50; 8.6 MG/1; MG/1
1 TABLET, FILM COATED ORAL DAILY
Qty: 30 TABLET | Refills: 0 | Status: SHIPPED | OUTPATIENT
Start: 2023-06-23 | End: 2023-07-23

## 2023-06-23 RX ORDER — PSYLLIUM SEED (WITH DEXTROSE)
3.4 POWDER (GRAM) ORAL DAILY
Qty: 538 G | Refills: 1 | Status: SHIPPED | OUTPATIENT
Start: 2023-06-23 | End: 2023-07-23

## 2023-06-23 NOTE — DISCHARGE INSTRUCTIONS
MiraLAX: Take 4 doses per day until you get very soft stool or diarrhea. At that point continue 1 dose every day indefinitely. Return to the emergency department immediately if there is any new or concerning symptom.

## 2023-06-23 NOTE — ED NOTES
Patient resting in bed. Respirations easy and unlabored. No distress noted. Call light within reach.        Leah Yuan RN  06/23/23 1930

## 2023-06-23 NOTE — ED NOTES
Pt to rm 10 per intake states she called her GI doctor about not being able to have a BM for the last 7 days- states she was advised to come to ER since he is off for the weekend. Pt states she has been on OTC metamucil, was prescribed lactulose and Linzess w/o relief. Pt states she is supposed to drink more water with those medications but states she has only had maybe 2 cups of water today. She also states she missed dialysis today because she was afraid she would have to have a BM while hooked up to the machine and would have an accident. Says she called them and plans to go tomorrow. Denies other needs or concerns.  MARTHAS     Elizabeth Shirley RN  06/23/23 5838

## 2023-06-23 NOTE — ED PROVIDER NOTES
[address] [facility],[address] DEPT  EMERGENCY DEPARTMENT ENCOUNTER          Pt Name: Consuelo Irving  MRN: [id number]  Farazgftorin 1955  Date of evaluation: 6/23/2023  Physician: Jeremy Hirsch MD, [facility], [facility]       Chief Complaint   Patient presents with    Constipation     For 7 days         HISTORY OF PRESENT ILLNESS    HPI  Consuelo Irving is a 69 y.o. female who presents to the emergency department from home, as a walk in to the ED lobby for evaluation of constipation. Patient states he has history of chronic constipation, 9 days prior to having a bowel movement, then after that it has been 6 days without having any. Denies vaginal symptoms, denies nausea, vomiting, abdominal pain. Patient states she did not go to dialysis today so that she would not have to have a bowel movement there, but has an appointment to go tomorrow and she still makes urine. Patient states that she is not on any fluid restriction. The patient has no other acute complaints at this time.       PAST MEDICAL AND SURGICAL HISTORY     Past Medical History:   Diagnosis Date    Allergic rhinitis     Anxiety     CVA (cerebral infarction)     Depression     Fibromyalgia     Headache(784.0)     Hyperlipidemia     Hypertension     Irritable bowel syndrome     Kidney failure     Unspecified cerebral artery occlusion with cerebral infarction     Unspecified sleep apnea      Past Surgical History:   Procedure Laterality Date    CARPAL TUNNEL RELEASE Right     COLONOSCOPY  2012    Duong    CT BIOPSY RENAL  12/28/2022    CT BIOPSY RENAL 12/28/2022 [facility] CT SCAN    ENDOSCOPY, COLON, DIAGNOSTIC  unsure    EYE SURGERY      lasik    HEMORRHOID SURGERY  unsure    HYSTERECTOMY (CERVIX STATUS UNKNOWN)      age 34    NECK SURGERY  18  years ago    fusion    OVARY REMOVAL Bilateral     age 34    SINUS SURGERY  10 years ago    TUBAL LIGATION           MEDICATIONS   No current facility-administered medications for this

## 2023-06-23 NOTE — ED NOTES
Discharge instructions and follow up discussed with pt. Pt verbalized understanding and denied further questions. LDA removed. Pt discharged with all belongings.         Amina Cook RN  06/23/23 3061

## 2023-07-05 ENCOUNTER — TELEMEDICINE (OUTPATIENT)
Dept: PSYCHOLOGY | Age: 68
End: 2023-07-05
Payer: MEDICARE

## 2023-07-05 DIAGNOSIS — F41.1 GENERALIZED ANXIETY DISORDER: Primary | Chronic | ICD-10-CM

## 2023-07-05 PROCEDURE — 1123F ACP DISCUSS/DSCN MKR DOCD: CPT | Performed by: PSYCHOLOGIST

## 2023-07-05 PROCEDURE — 90834 PSYTX W PT 45 MINUTES: CPT | Performed by: PSYCHOLOGIST

## 2023-07-05 NOTE — PROGRESS NOTES
[address] [place] [address]  [address] [facility]  [address]  [address] [facility],[address]  Dept: [phone]  Dept Fax: [phone]: [phone]      Patient: Hilario Renae  Visit Date: 7/5/2023  Referring Provider:   No ref. provider found    SUBJECTIVE DATA      Hilario Renae, was evaluated through a synchronous (real-time) audio-video encounter. The patient (or guardian if applicable) is aware that this is a billable service, which includes applicable co-pays. Patient identification was verified, and a caregiver was present when appropriate. The patient was located in a state where the provider was licensed to provide care. This Virtual Visit was conducted with patient's (and/or legal guardian's) consent. .     Patient location: Home  Provider location: Home or office     CHIEF COMPLAINT:    Chief Complaint   Patient presents with    Anxiety    Stress         Patient update:      Patient reports   Patient has been dealing with a lot of stuff, got COVID, found out she needed dialysis, then broke her arm at doctor's office   Has been able to walk with Myron Clarke holding her arm, but otherwise uses a walker  Trying to spend time with her friends  Dean Early has been helpful  Has continued to do some card playing  Odilon Carreon has settled in at residential place in [facility], which she called 31 places to find.   He wants to come back to [facility], but she knows it would be worse for him   Myron Clarke has been very supportive, grateful for that  Typical day: talks with friends, watches TV, time with Myron Clarke, doing some things around the house (laundry, load , a little bit of cooking)  Needs to f/u with Dr. Babita London or Dr. Nelson Tadeo  Is down to 98 lbs, can't gain weight back  We did some problem-solving on that  Getting a lot of support from sister Avila Price  Distressed because sister Keyanna Pimentel continues to be angry and negative, critical  Son Linsey Farrar is in [facility], doing well, making good money, not

## 2023-07-07 ENCOUNTER — HOSPITAL ENCOUNTER (EMERGENCY)
Age: 68
Discharge: HOME | End: 2023-07-07
Payer: MEDICARE

## 2023-07-07 ENCOUNTER — APPOINTMENT (OUTPATIENT)
Dept: GENERAL RADIOLOGY | Age: 68
End: 2023-07-07
Payer: MEDICARE

## 2023-07-07 VITALS
TEMPERATURE: 98.4 F | OXYGEN SATURATION: 96 % | BODY MASS INDEX: 19.24 KG/M2 | RESPIRATION RATE: 18 BRPM | HEART RATE: 96 BPM | WEIGHT: 98 LBS | DIASTOLIC BLOOD PRESSURE: 68 MMHG | HEIGHT: 60 IN | SYSTOLIC BLOOD PRESSURE: 135 MMHG

## 2023-07-07 DIAGNOSIS — M10.9 GOUT, UNSPECIFIED CAUSE, UNSPECIFIED CHRONICITY, UNSPECIFIED SITE: Primary | ICD-10-CM

## 2023-07-07 PROCEDURE — 73630 X-RAY EXAM OF FOOT: CPT

## 2023-07-07 PROCEDURE — 99283 EMERGENCY DEPT VISIT LOW MDM: CPT

## 2023-07-07 RX ORDER — PREDNISONE 20 MG/1
TABLET ORAL
Qty: 15 TABLET | Refills: 0 | Status: SHIPPED | OUTPATIENT
Start: 2023-07-07 | End: 2023-07-07 | Stop reason: SDUPTHER

## 2023-07-07 RX ORDER — HYDROCODONE BITARTRATE AND ACETAMINOPHEN 5; 325 MG/1; MG/1
.5-1 TABLET ORAL EVERY 6 HOURS PRN
Qty: 12 TABLET | Refills: 0 | Status: SHIPPED | OUTPATIENT
Start: 2023-07-07 | End: 2023-07-10

## 2023-07-07 RX ORDER — HYDROCODONE BITARTRATE AND ACETAMINOPHEN 5; 325 MG/1; MG/1
.5-1 TABLET ORAL EVERY 6 HOURS PRN
Qty: 12 TABLET | Refills: 0 | Status: SHIPPED | OUTPATIENT
Start: 2023-07-07 | End: 2023-07-07 | Stop reason: SDUPTHER

## 2023-07-07 RX ORDER — PREDNISONE 20 MG/1
TABLET ORAL
Qty: 15 TABLET | Refills: 0 | Status: SHIPPED | OUTPATIENT
Start: 2023-07-07

## 2023-07-07 ASSESSMENT — PAIN SCALES - GENERAL: PAINLEVEL_OUTOF10: 9

## 2023-07-07 ASSESSMENT — PAIN DESCRIPTION - ORIENTATION: ORIENTATION: RIGHT

## 2023-07-07 ASSESSMENT — PAIN - FUNCTIONAL ASSESSMENT: PAIN_FUNCTIONAL_ASSESSMENT: 0-10

## 2023-07-07 ASSESSMENT — PAIN DESCRIPTION - LOCATION: LOCATION: TOE (COMMENT WHICH ONE)

## 2023-07-07 NOTE — ED TRIAGE NOTES
Pt presents to the ER with c/o right great toe pain that started last night. Pt denies injury. States she cannot bend it. Pt took tylenol 3 hours ago.  Pt is alert, vss

## 2023-07-07 NOTE — ED PROVIDER NOTES
[facility] EMERGENCY DEPT      EMERGENCY MEDICINE     Pt Name: María Elena Edmonds  MRN: [id number]  [address] [facility] 1955  Date of evaluation: 7/7/2023  Provider: DENISSE Coleman    CHIEF COMPLAINT       Chief Complaint   Patient presents with    Toe Pain     Right great toe     HISTORY OF PRESENT ILLNESS   María Elena Edmonds is a pleasant 69 y.o. female who presents to the emergency department from from home, by private vehicle for evaluation of right toe pain. Last night, patient started feeling significant pain in her right toe, had trouble walking on it as it was super painful. Tried icing it and taking Tylenol with no relief. Had difficulty sleeping last night because it hurts above. Is worried that it is broken though denies any trauma or injury to the area. Is currently on dialysis for kidney failure, no history of diabetes. No history of gout. No new medications.     PASTMEDICAL HISTORY     Past Medical History:   Diagnosis Date    Allergic rhinitis     Anxiety     CVA (cerebral infarction)     Depression     Fibromyalgia     Headache(784.0)     Hyperlipidemia     Hypertension     Irritable bowel syndrome     Kidney failure     Unspecified cerebral artery occlusion with cerebral infarction     Unspecified sleep apnea        Patient Active Problem List   Diagnosis Code    Cerebral infarction ([address]) I63.9    Hx of Chest pain, atypical- tenderness on the left lower chest wall R07.89    Anxiety disorder F41.9    History of CVA (cerebrovascular accident) Z86.73    Hyperlipidemia E78.5    Irritable bowel syndrome K58.9    Abdominal adhesions K66.0    Allergic rhinitis J30.9    Essential hypertension I10    Generalized anxiety disorder F41.1    Stage 4 chronic kidney disease (HCC) N18.4    Age related osteoporosis M81.0    Major depression, recurrent ([address]) F33.9    Environmental and seasonal allergies J30.89    Hearing loss of right ear due to cerumen impaction H61.21    Recurrent sinusitis J32.9    Abnormal EKG

## 2023-07-25 ENCOUNTER — NURSE ONLY (OUTPATIENT)
Dept: FAMILY MEDICINE CLINIC | Age: 68
End: 2023-07-25

## 2023-07-25 VITALS — SYSTOLIC BLOOD PRESSURE: 154 MMHG | HEART RATE: 80 BPM | DIASTOLIC BLOOD PRESSURE: 88 MMHG

## 2023-07-25 DIAGNOSIS — I63.00 CEREBRAL INFARCTION DUE TO THROMBOSIS OF PRECEREBRAL ARTERY (HCC): Chronic | ICD-10-CM

## 2023-07-25 PROCEDURE — 99999 PR OFFICE/OUTPT VISIT,PROCEDURE ONLY: CPT | Performed by: FAMILY MEDICINE

## 2023-07-25 RX ORDER — CLOPIDOGREL BISULFATE 75 MG/1
75 TABLET ORAL DAILY
Qty: 90 TABLET | Refills: 1 | Status: SHIPPED | OUTPATIENT
Start: 2023-07-25

## 2023-07-25 NOTE — PROGRESS NOTES
Patient here for nurse visit BP check currently is asymptomatic with no concerns at this time    Pt and  state she was taken off BP meds 6 months ago due to low BP's. She has not noticed any changes in how she is feeling. Still really shaky at times and still having dizziness when go from sitting to standing.   Both same as when was on BP meds    /84  P 80    After 20 minutes    /88  P 80    Home BP log scanned in    Also needs Clopidogrel refill    5/17/2023  Visit date not found    Tac[employer] entered

## 2023-07-27 ENCOUNTER — TELEPHONE (OUTPATIENT)
Dept: FAMILY MEDICINE CLINIC | Age: 68
End: 2023-07-27

## 2023-07-27 RX ORDER — AMLODIPINE BESYLATE 5 MG/1
5 TABLET ORAL DAILY
Qty: 30 TABLET | Refills: 5 | Status: SHIPPED | OUTPATIENT
Start: 2023-07-27

## 2023-07-27 RX ORDER — AMLODIPINE BESYLATE 5 MG/1
5 TABLET ORAL DAILY
Qty: 90 TABLET | Refills: 3 | Status: CANCELLED | OUTPATIENT
Start: 2023-07-27

## 2023-07-27 NOTE — TELEPHONE ENCOUNTER
Pt  called asking if pt needs to go back on BP medications.  Pt was seen 7/25 for nurse visit BP check

## 2023-08-01 ENCOUNTER — OFFICE VISIT (OUTPATIENT)
Dept: CARDIOTHORACIC SURGERY | Age: 68
End: 2023-08-01
Payer: MEDICARE

## 2023-08-01 VITALS
SYSTOLIC BLOOD PRESSURE: 155 MMHG | DIASTOLIC BLOOD PRESSURE: 81 MMHG | HEART RATE: 86 BPM | HEIGHT: 60 IN | BODY MASS INDEX: 20.14 KG/M2 | WEIGHT: 102.6 LBS

## 2023-08-01 DIAGNOSIS — Z01.818 PRE-OP EXAMINATION: Primary | ICD-10-CM

## 2023-08-01 DIAGNOSIS — Z99.2 ESRD (END STAGE RENAL DISEASE) ON DIALYSIS (HCC): ICD-10-CM

## 2023-08-01 DIAGNOSIS — N18.6 ESRD (END STAGE RENAL DISEASE) ON DIALYSIS (HCC): ICD-10-CM

## 2023-08-01 PROCEDURE — 1036F TOBACCO NON-USER: CPT | Performed by: THORACIC SURGERY (CARDIOTHORACIC VASCULAR SURGERY)

## 2023-08-01 PROCEDURE — 3077F SYST BP >= 140 MM HG: CPT | Performed by: THORACIC SURGERY (CARDIOTHORACIC VASCULAR SURGERY)

## 2023-08-01 PROCEDURE — 99205 OFFICE O/P NEW HI 60 MIN: CPT | Performed by: THORACIC SURGERY (CARDIOTHORACIC VASCULAR SURGERY)

## 2023-08-01 PROCEDURE — 1090F PRES/ABSN URINE INCON ASSESS: CPT | Performed by: THORACIC SURGERY (CARDIOTHORACIC VASCULAR SURGERY)

## 2023-08-01 PROCEDURE — G8420 CALC BMI NORM PARAMETERS: HCPCS | Performed by: THORACIC SURGERY (CARDIOTHORACIC VASCULAR SURGERY)

## 2023-08-01 PROCEDURE — 1123F ACP DISCUSS/DSCN MKR DOCD: CPT | Performed by: THORACIC SURGERY (CARDIOTHORACIC VASCULAR SURGERY)

## 2023-08-01 PROCEDURE — G8399 PT W/DXA RESULTS DOCUMENT: HCPCS | Performed by: THORACIC SURGERY (CARDIOTHORACIC VASCULAR SURGERY)

## 2023-08-01 PROCEDURE — 3079F DIAST BP 80-89 MM HG: CPT | Performed by: THORACIC SURGERY (CARDIOTHORACIC VASCULAR SURGERY)

## 2023-08-01 PROCEDURE — 3017F COLORECTAL CA SCREEN DOC REV: CPT | Performed by: THORACIC SURGERY (CARDIOTHORACIC VASCULAR SURGERY)

## 2023-08-01 PROCEDURE — G8427 DOCREV CUR MEDS BY ELIG CLIN: HCPCS | Performed by: THORACIC SURGERY (CARDIOTHORACIC VASCULAR SURGERY)

## 2023-08-01 RX ORDER — CALCITRIOL 0.25 UG/1
0.25 CAPSULE, LIQUID FILLED ORAL DAILY
COMMUNITY

## 2023-08-01 RX ORDER — CALCIUM POLYCARBOPHIL 625 MG 625 MG/1
625 TABLET ORAL DAILY
COMMUNITY

## 2023-08-01 NOTE — PATIENT INSTRUCTIONS
If you receive a survey asking about your care experience, please respond. Your answers will help ensure you receive high-quality care at this office. Thank you! Your Medical Assistant today: Jimbo Spears. Thank you for coming to our office! It was a pleasure to serve you.

## 2023-08-01 NOTE — PROGRESS NOTES
CT/CV Surgery New Patient Office Visit      Patient's Name/Date of Birth: Natalia Ng / 1955 (81 y.o.)      PCP: Elian Castillo MD    Date: August 1, 2023     CC:   Chief Complaint   Patient presents with    New Patient     Fistula consultation, referral from Dr. Kortney Garcia        HPI:   We had the pleasure of seeing Natalia Ng in the office today, as you know this is a very pleasant 69y.o. year old female with a history of hypertension, hyperlipidemia, CVA with left-sided weakness, and chronic kidney disease stage V on hemodialysis via right IJ tunneled catheter since December, 2022. She came to the cardiovascular surgery clinic for surgical evaluation for AV fistula. She is a right-handed dominant person. She denies any history of coronary disease, recent episode of midsternal chest pain or shortness of breath at rest.    She has a venous mapping study recently, which showed very small size veins in the upper extremity. PastMedical History: Ada Dawkins  has a past medical history of Allergic rhinitis, Anxiety, CVA (cerebral infarction), Depression, Fibromyalgia, Headache(784.0), Hyperlipidemia, Hypertension, Irritable bowel syndrome, Kidney failure, Unspecified cerebral artery occlusion with cerebral infarction, and Unspecified sleep apnea. Past Surgical History:  The patient  has a past surgical history that includes sinus surgery (10 years ago); Hemorrhoid surgery (unsure); Neck surgery (18  years ago); eye surgery; Endoscopy, colon, diagnostic (unsure); Colonoscopy (2012); Tubal ligation; Carpal tunnel release (Right); Hysterectomy; Ovary removal (Bilateral); and CT BIOPSY RENAL (12/28/2022). Allergies: The patient is allergic to pioglitazone, amoxicillin, amoxicillin-pot clavulanate, other, ciprofloxacin, and sulfa antibiotics.     Medications:    Current Outpatient Medications:     polycarbophil (FIBERCON) 625 MG tablet, Take 1 tablet by mouth daily, Disp: , Rfl:     calcitRIOL

## 2023-08-10 ENCOUNTER — HOSPITAL ENCOUNTER (OUTPATIENT)
Dept: NON INVASIVE DIAGNOSTICS | Age: 68
Discharge: HOME | End: 2023-08-10
Attending: THORACIC SURGERY (CARDIOTHORACIC VASCULAR SURGERY)

## 2023-08-10 DIAGNOSIS — Z01.818 PRE-OP EXAMINATION: ICD-10-CM

## 2023-08-10 DIAGNOSIS — Z99.2 ESRD (END STAGE RENAL DISEASE) ON DIALYSIS (HCC): ICD-10-CM

## 2023-08-10 DIAGNOSIS — N18.6 ESRD (END STAGE RENAL DISEASE) ON DIALYSIS (HCC): ICD-10-CM

## 2023-08-10 NOTE — PROGRESS NOTES
Patient had echo done on 5/1/23, Dr. Eron Dougherty was contacted through [facility] to see if the echo needed repeated. He said the echo did not need to be repeated. So echo was cancelled and patient informed that echo was not needed since she had one done in May. Patient understood and left.

## 2023-08-15 ENCOUNTER — OFFICE VISIT (OUTPATIENT)
Dept: CARDIOTHORACIC SURGERY | Age: 68
End: 2023-08-15
Payer: MEDICARE

## 2023-08-15 VITALS
HEIGHT: 60 IN | WEIGHT: 102 LBS | SYSTOLIC BLOOD PRESSURE: 160 MMHG | HEART RATE: 85 BPM | DIASTOLIC BLOOD PRESSURE: 77 MMHG | BODY MASS INDEX: 20.03 KG/M2

## 2023-08-15 DIAGNOSIS — N18.6 ESRD (END STAGE RENAL DISEASE) ON DIALYSIS (HCC): Primary | ICD-10-CM

## 2023-08-15 DIAGNOSIS — Z99.2 ESRD (END STAGE RENAL DISEASE) ON DIALYSIS (HCC): Primary | ICD-10-CM

## 2023-08-15 PROCEDURE — 3017F COLORECTAL CA SCREEN DOC REV: CPT | Performed by: THORACIC SURGERY (CARDIOTHORACIC VASCULAR SURGERY)

## 2023-08-15 PROCEDURE — G8427 DOCREV CUR MEDS BY ELIG CLIN: HCPCS | Performed by: THORACIC SURGERY (CARDIOTHORACIC VASCULAR SURGERY)

## 2023-08-15 PROCEDURE — G8399 PT W/DXA RESULTS DOCUMENT: HCPCS | Performed by: THORACIC SURGERY (CARDIOTHORACIC VASCULAR SURGERY)

## 2023-08-15 PROCEDURE — 99215 OFFICE O/P EST HI 40 MIN: CPT | Performed by: THORACIC SURGERY (CARDIOTHORACIC VASCULAR SURGERY)

## 2023-08-15 PROCEDURE — G8420 CALC BMI NORM PARAMETERS: HCPCS | Performed by: THORACIC SURGERY (CARDIOTHORACIC VASCULAR SURGERY)

## 2023-08-15 PROCEDURE — 1090F PRES/ABSN URINE INCON ASSESS: CPT | Performed by: THORACIC SURGERY (CARDIOTHORACIC VASCULAR SURGERY)

## 2023-08-15 PROCEDURE — 3078F DIAST BP <80 MM HG: CPT | Performed by: THORACIC SURGERY (CARDIOTHORACIC VASCULAR SURGERY)

## 2023-08-15 PROCEDURE — 3077F SYST BP >= 140 MM HG: CPT | Performed by: THORACIC SURGERY (CARDIOTHORACIC VASCULAR SURGERY)

## 2023-08-15 PROCEDURE — 1123F ACP DISCUSS/DSCN MKR DOCD: CPT | Performed by: THORACIC SURGERY (CARDIOTHORACIC VASCULAR SURGERY)

## 2023-08-15 PROCEDURE — 1036F TOBACCO NON-USER: CPT | Performed by: THORACIC SURGERY (CARDIOTHORACIC VASCULAR SURGERY)

## 2023-08-15 NOTE — PROGRESS NOTES
[address] [facility] [address] [place] [facility]  Phone:  [phone]          Name: Shruthi Gonzalez  : 1955    Chief Complaint   Patient presents with    Medicare AW       HPI:     Shruthi Gonzalez is a 69 y.o. female who presents today for follow up of anxiety and depression. She has a lot going on and is on HD M/W/F. She's feeling weak and fell twice last week at [place] as her legs gave out. She's feeling more depressed. At her last appointment her Wellbutrin was increased and she thinks it's helping. She's not ready to adjust her depression medication at this time. She's eating breakfast, lunch, supper, and snacks. Current Outpatient Medications:     Azelastine HCl (ASTEPRO NA), by Nasal route, Disp: , Rfl:     polyethylene glycol (GLYCOLAX) 17 g packet, Take 1 packet by mouth daily as needed for Constipation, Disp: , Rfl:     buPROPion (WELLBUTRIN XL) 300 MG extended release tablet, Take with 150 mg for 450 mg daily. , Disp: 90 tablet, Rfl: 1    buPROPion (WELLBUTRIN XL) 150 MG extended release tablet, Take with 300 mg daily for 450 mg daily. , Disp: 90 tablet, Rfl: 1    clopidogrel (PLAVIX) 75 MG tablet, Take 1 tablet by mouth daily, Disp: 90 tablet, Rfl: 1    polycarbophil (FIBERCON) 625 MG tablet, Take 1 tablet by mouth daily, Disp: , Rfl:     calcitRIOL (ROCALTROL) 0.25 MCG capsule, Take 1 capsule by mouth daily, Disp: , Rfl:     amLODIPine (NORVASC) 5 MG tablet, Take 1 tablet by mouth daily, Disp: 30 tablet, Rfl: 5    simvastatin (ZOCOR) 20 MG tablet, TAKE 1 TABLET BY MOUTH DAILY, Disp: 90 tablet, Rfl: 3    linaclotide (LINZESS) 145 MCG capsule, Take 2 capsules by mouth every morning (before breakfast), Disp: , Rfl:     Omega-3 Fatty Acids (FISH OIL) 1360 MG CAPS, Take by mouth, Disp: , Rfl:     docusate sodium (COLACE) 100 MG capsule, Take 1 capsule by mouth 2 times daily, Disp: , Rfl:     montelukast (SINGULAIR) 10 MG tablet, Take 1 tablet by mouth daily, Disp: 90

## 2023-08-15 NOTE — PROGRESS NOTES
with normal bowel sounds. Ext: No clubbing, cyanosis or edema bilaterally. No chronic ischemic changes, No varicorsity in lower leg. Skin: Skin color, texture, turgor normal.  No rashes or lesions. No rubor. No venous stasis pigmentation. Neurologic:  Neurovascularly intact without any focal sensory/motor deficits. Left hand Shahid test shows good collateral circulation between ulnar and radial artery. Labs:    CBC:  Lab Results   Component Value Date/Time    WBC 7.3 05/10/2023 11:57 AM    HGB 10.2 05/10/2023 11:57 AM    HCT 32.1 05/10/2023 11:57 AM    MCV 92 05/10/2023 11:57 AM     05/10/2023 11:57 AM    INR 0.92 12/28/2022 05:47 AM     BMP:   Lab Results   Component Value Date/Time     05/01/2023 05:22 AM    K 4.4 05/01/2023 05:22 AM    K 4.2 04/29/2023 04:58 AM     05/01/2023 05:22 AM    CO2 19 05/01/2023 05:22 AM    PHOS 2.5 03/03/2023 05:06 AM    BUN 70 05/01/2023 05:22 AM    CREATININE 7.3 05/01/2023 05:22 AM    MG 2.5 03/02/2023 02:55 PM       Imaging: I have reviewed the vein mapping.       Problem List:  Patient Active Problem List   Diagnosis    Cerebral infarction ([address])    Hx of Chest pain, atypical- tenderness on the left lower chest wall    Anxiety disorder    History of CVA (cerebrovascular accident)    Hyperlipidemia    Irritable bowel syndrome    Abdominal adhesions    Allergic rhinitis    Essential hypertension    Generalized anxiety disorder    Stage 4 chronic kidney disease (HCC)    Age related osteoporosis    Major depression, recurrent ([address])    Environmental and seasonal allergies    Hearing loss of right ear due to cerumen impaction    Recurrent sinusitis    Abnormal EKG    Postural dizziness    SOB (shortness of breath) on exertion    Lumbosacral radiculopathy    Degeneration of lumbar intervertebral disc    Acute renal failure (HCC)    Metabolic acidosis    Hypokalemia    Hypomagnesemia    Hyperphosphatemia    Hypocalcemia    Hemoptysis    ERUM (acute kidney injury)

## 2023-08-15 NOTE — PROGRESS NOTES
[address] [facility] [address] [address]  Phone:  [phone]       Medicare Annual Wellness Visit    Amina Jeong is here for Medicare AWV    Assessment & Plan   Encounter for Medicare annual wellness exam        -     Routine health maintenance screening was reviewed as below. Recommendations for Preventive Services Due: see orders and patient instructions/AVS.  Recommended screening schedule for the next 5-10 years is provided to the patient in written form: see Patient Instructions/AVS.         Patient's complete Health Risk Assessment and screening values have been reviewed and are found in Flowsheets. The following problems were reviewed today and where indicated follow up appointments were made and/or referrals ordered. Positive Risk Factor Screenings with Interventions:    Fall Risk:  Do you feel unsteady or are you worried about falling? : (!) yes  2 or more falls in past year?: (!) yes  Fall with injury in past year?: no     Interventions:    Patient declines any further evaluation or treatment    Cognitive: Words recalled: 1 Word Recalled   Clock Drawing Test (CDT): (!) Abnormal   Total Score: (!) 1   Total Score Interpretation: Abnormal Mini-Cog      Interventions:  Patient declines any further evaluation or treatment    Depression:  PHQ-2 Score: 3  PHQ-9 Total Score: 5    Interpretation:   1-4 = minimal  5-9 = mild  10-14 = moderate  15-19 = moderately severe  20-27 = severe    Interventions:  See separate note for details         Self-assessment of health:   In general, how would you say your health is?: (!) Poor    Interventions:  Patient declines any further evaluation or treatment    General HRA Questions:  Select all that apply: (!) Stress    Stress Interventions:  See separate note for details          Vision Screen:  Do you have difficulty driving, watching TV, or doing any of your daily activities because of your eyesight?: No  Have you had an eye exam within

## 2023-08-15 NOTE — PATIENT INSTRUCTIONS
If you receive a survey asking about your care experience, please respond. Your answers will help ensure you receive high-quality care at this office. Thank you! Your Medical Assistant today: Neeraj Stock. Thank you for coming to our office! It was a pleasure to serve you.

## 2023-08-17 ENCOUNTER — OFFICE VISIT (OUTPATIENT)
Dept: FAMILY MEDICINE CLINIC | Age: 68
End: 2023-08-17
Payer: MEDICARE

## 2023-08-17 VITALS
HEART RATE: 86 BPM | DIASTOLIC BLOOD PRESSURE: 72 MMHG | TEMPERATURE: 98.5 F | RESPIRATION RATE: 20 BRPM | HEIGHT: 60 IN | SYSTOLIC BLOOD PRESSURE: 124 MMHG | BODY MASS INDEX: 19.63 KG/M2 | OXYGEN SATURATION: 98 % | WEIGHT: 100 LBS

## 2023-08-17 DIAGNOSIS — Z00.00 ENCOUNTER FOR MEDICARE ANNUAL WELLNESS EXAM: Primary | ICD-10-CM

## 2023-08-17 DIAGNOSIS — I63.00 CEREBRAL INFARCTION DUE TO THROMBOSIS OF PRECEREBRAL ARTERY (HCC): Chronic | ICD-10-CM

## 2023-08-17 DIAGNOSIS — F33.42 RECURRENT MAJOR DEPRESSIVE DISORDER, IN FULL REMISSION (HCC): ICD-10-CM

## 2023-08-17 DIAGNOSIS — D69.6 THROMBOCYTOPENIA (HCC): ICD-10-CM

## 2023-08-17 PROCEDURE — 3017F COLORECTAL CA SCREEN DOC REV: CPT | Performed by: FAMILY MEDICINE

## 2023-08-17 PROCEDURE — 3074F SYST BP LT 130 MM HG: CPT | Performed by: FAMILY MEDICINE

## 2023-08-17 PROCEDURE — 3078F DIAST BP <80 MM HG: CPT | Performed by: FAMILY MEDICINE

## 2023-08-17 PROCEDURE — G0439 PPPS, SUBSEQ VISIT: HCPCS | Performed by: FAMILY MEDICINE

## 2023-08-17 PROCEDURE — 1123F ACP DISCUSS/DSCN MKR DOCD: CPT | Performed by: FAMILY MEDICINE

## 2023-08-17 PROCEDURE — 99213 OFFICE O/P EST LOW 20 MIN: CPT | Performed by: FAMILY MEDICINE

## 2023-08-17 RX ORDER — BUPROPION HYDROCHLORIDE 300 MG/1
TABLET ORAL
Qty: 90 TABLET | Refills: 1 | Status: SHIPPED | OUTPATIENT
Start: 2023-08-17

## 2023-08-17 RX ORDER — CLOPIDOGREL BISULFATE 75 MG/1
75 TABLET ORAL DAILY
Qty: 90 TABLET | Refills: 1 | Status: SHIPPED | OUTPATIENT
Start: 2023-08-17

## 2023-08-17 RX ORDER — BUPROPION HYDROCHLORIDE 150 MG/1
TABLET ORAL
Qty: 90 TABLET | Refills: 1 | Status: SHIPPED | OUTPATIENT
Start: 2023-08-17

## 2023-08-17 RX ORDER — POLYETHYLENE GLYCOL 3350 17 G/17G
17 POWDER, FOR SOLUTION ORAL DAILY PRN
COMMUNITY

## 2023-08-17 ASSESSMENT — PATIENT HEALTH QUESTIONNAIRE - PHQ9
SUM OF ALL RESPONSES TO PHQ QUESTIONS 1-9: 5
SUM OF ALL RESPONSES TO PHQ9 QUESTIONS 1 & 2: 3
2. FEELING DOWN, DEPRESSED OR HOPELESS: 3
4. FEELING TIRED OR HAVING LITTLE ENERGY: 1
8. MOVING OR SPEAKING SO SLOWLY THAT OTHER PEOPLE COULD HAVE NOTICED. OR THE OPPOSITE, BEING SO FIGETY OR RESTLESS THAT YOU HAVE BEEN MOVING AROUND A LOT MORE THAN USUAL: 0
10. IF YOU CHECKED OFF ANY PROBLEMS, HOW DIFFICULT HAVE THESE PROBLEMS MADE IT FOR YOU TO DO YOUR WORK, TAKE CARE OF THINGS AT HOME, OR GET ALONG WITH OTHER PEOPLE: 0
SUM OF ALL RESPONSES TO PHQ QUESTIONS 1-9: 5
9. THOUGHTS THAT YOU WOULD BE BETTER OFF DEAD, OR OF HURTING YOURSELF: 0
3. TROUBLE FALLING OR STAYING ASLEEP: 0
SUM OF ALL RESPONSES TO PHQ QUESTIONS 1-9: 5
5. POOR APPETITE OR OVEREATING: 0
6. FEELING BAD ABOUT YOURSELF - OR THAT YOU ARE A FAILURE OR HAVE LET YOURSELF OR YOUR FAMILY DOWN: 1
1. LITTLE INTEREST OR PLEASURE IN DOING THINGS: 0
7. TROUBLE CONCENTRATING ON THINGS, SUCH AS READING THE NEWSPAPER OR WATCHING TELEVISION: 0
SUM OF ALL RESPONSES TO PHQ QUESTIONS 1-9: 5

## 2023-08-17 ASSESSMENT — LIFESTYLE VARIABLES: HOW OFTEN DO YOU HAVE A DRINK CONTAINING ALCOHOL: NEVER

## 2023-08-17 ASSESSMENT — ENCOUNTER SYMPTOMS
VOMITING: 0
NAUSEA: 0

## 2023-08-28 ENCOUNTER — HOSPITAL ENCOUNTER (INPATIENT)
Age: 68
LOS: 4 days | Discharge: NURSING, REHAB OR LONG-TERM CARE | End: 2023-09-01
Attending: EMERGENCY MEDICINE
Payer: MEDICARE

## 2023-08-28 ENCOUNTER — APPOINTMENT (OUTPATIENT)
Dept: CT IMAGING | Age: 68
End: 2023-08-28
Payer: MEDICARE

## 2023-08-28 DIAGNOSIS — N20.1 URETEROLITHIASIS: Primary | ICD-10-CM

## 2023-08-28 PROBLEM — N13.30 HYDRONEPHROSIS: Status: ACTIVE | Noted: 2023-08-28

## 2023-08-28 LAB
ALBUMIN SERPL BCG-MCNC: 4.2 G/DL (ref 3.5–5.1)
ALP SERPL-CCNC: 103 U/L (ref 38–126)
ALT SERPL W/O P-5'-P-CCNC: 18 U/L (ref 11–66)
ANION GAP SERPL CALC-SCNC: 20 MEQ/L (ref 8–16)
AST SERPL-CCNC: 20 U/L (ref 5–40)
BACTERIA URNS QL MICRO: ABNORMAL /HPF
BASOPHILS ABSOLUTE: 0.1 THOU/MM3 (ref 0–0.1)
BASOPHILS NFR BLD AUTO: 1.1 %
BILIRUB SERPL-MCNC: 0.2 MG/DL (ref 0.3–1.2)
BILIRUB UR QL STRIP.AUTO: NEGATIVE
BUN SERPL-MCNC: 63 MG/DL (ref 7–22)
CALCIUM SERPL-MCNC: 10.2 MG/DL (ref 8.5–10.5)
CASTS #/AREA URNS LPF: ABNORMAL /LPF
CASTS 2: ABNORMAL /LPF
CHARACTER UR: ABNORMAL
CHLORIDE SERPL-SCNC: 104 MEQ/L (ref 98–111)
CO2 SERPL-SCNC: 18 MEQ/L (ref 23–33)
COLOR: YELLOW
CREAT SERPL-MCNC: 7.1 MG/DL (ref 0.4–1.2)
CRYSTALS URNS MICRO: ABNORMAL
DEPRECATED RDW RBC AUTO: 47.8 FL (ref 35–45)
EOSINOPHIL NFR BLD AUTO: 2.2 %
EOSINOPHILS ABSOLUTE: 0.2 THOU/MM3 (ref 0–0.4)
EPITHELIAL CELLS, UA: ABNORMAL /HPF
ERYTHROCYTE [DISTWIDTH] IN BLOOD BY AUTOMATED COUNT: 13.5 % (ref 11.5–14.5)
GFR SERPL CREATININE-BSD FRML MDRD: 6 ML/MIN/1.73M2
GLUCOSE SERPL-MCNC: 68 MG/DL (ref 70–108)
GLUCOSE UR QL STRIP.AUTO: NEGATIVE MG/DL
HCT VFR BLD AUTO: 37.5 % (ref 37–47)
HGB BLD-MCNC: 11.5 GM/DL (ref 12–16)
HGB UR QL STRIP.AUTO: ABNORMAL
IMM GRANULOCYTES # BLD AUTO: 0.05 THOU/MM3 (ref 0–0.07)
IMM GRANULOCYTES NFR BLD AUTO: 0.7 %
KETONES UR QL STRIP.AUTO: ABNORMAL
LIPASE SERPL-CCNC: 28.7 U/L (ref 5.6–51.3)
LYMPHOCYTES ABSOLUTE: 1.5 THOU/MM3 (ref 1–4.8)
LYMPHOCYTES NFR BLD AUTO: 21.1 %
MCH RBC QN AUTO: 29.9 PG (ref 26–33)
MCHC RBC AUTO-ENTMCNC: 30.7 GM/DL (ref 32.2–35.5)
MCV RBC AUTO: 97.4 FL (ref 81–99)
MISCELLANEOUS 2: ABNORMAL
MONOCYTES ABSOLUTE: 0.4 THOU/MM3 (ref 0.4–1.3)
MONOCYTES NFR BLD AUTO: 5.2 %
MUCOUS THREADS URNS QL MICRO: ABNORMAL
NEUTROPHILS NFR BLD AUTO: 69.7 %
NITRITE UR QL STRIP: NEGATIVE
NRBC BLD AUTO-RTO: 0 /100 WBC
OSMOLALITY SERPL CALC.SUM OF ELEC: 299.4 MOSMOL/KG (ref 275–300)
PH UR STRIP.AUTO: 5.5 [PH] (ref 5–9)
PLATELET # BLD AUTO: 228 THOU/MM3 (ref 130–400)
PMV BLD AUTO: 10.1 FL (ref 9.4–12.4)
POTASSIUM SERPL-SCNC: 4.4 MEQ/L (ref 3.5–5.2)
PROT SERPL-MCNC: 7.1 G/DL (ref 6.1–8)
PROT UR STRIP.AUTO-MCNC: 30 MG/DL
RBC # BLD AUTO: 3.85 MILL/MM3 (ref 4.2–5.4)
RBC URINE: ABNORMAL /HPF
RENAL EPI CELLS #/AREA URNS HPF: ABNORMAL /[HPF]
SEGMENTED NEUTROPHILS ABSOLUTE COUNT: 5 THOU/MM3 (ref 1.8–7.7)
SODIUM SERPL-SCNC: 142 MEQ/L (ref 135–145)
SP GR UR REFRACT.AUTO: 1.01 (ref 1–1.03)
TROPONIN, HIGH SENSITIVITY: 63 NG/L (ref 0–12)
UROBILINOGEN, URINE: 0.2 EU/DL (ref 0–1)
WBC # BLD AUTO: 7.2 THOU/MM3 (ref 4.8–10.8)
WBC #/AREA URNS HPF: ABNORMAL /HPF
WBC #/AREA URNS HPF: ABNORMAL /[HPF]
YEAST LIKE FUNGI URNS QL MICRO: ABNORMAL

## 2023-08-28 PROCEDURE — 93005 ELECTROCARDIOGRAM TRACING: CPT | Performed by: EMERGENCY MEDICINE

## 2023-08-28 PROCEDURE — 84484 ASSAY OF TROPONIN QUANT: CPT

## 2023-08-28 PROCEDURE — 83690 ASSAY OF LIPASE: CPT

## 2023-08-28 PROCEDURE — 99285 EMERGENCY DEPT VISIT HI MDM: CPT

## 2023-08-28 PROCEDURE — 99222 1ST HOSP IP/OBS MODERATE 55: CPT | Performed by: INTERNAL MEDICINE

## 2023-08-28 PROCEDURE — 36415 COLL VENOUS BLD VENIPUNCTURE: CPT

## 2023-08-28 PROCEDURE — 1200000000 HC SEMI PRIVATE

## 2023-08-28 PROCEDURE — 6360000002 HC RX W HCPCS: Performed by: EMERGENCY MEDICINE

## 2023-08-28 PROCEDURE — 93010 ELECTROCARDIOGRAM REPORT: CPT | Performed by: NUCLEAR MEDICINE

## 2023-08-28 PROCEDURE — 2580000003 HC RX 258: Performed by: EMERGENCY MEDICINE

## 2023-08-28 PROCEDURE — 6370000000 HC RX 637 (ALT 250 FOR IP): Performed by: PHYSICIAN ASSISTANT

## 2023-08-28 PROCEDURE — 99221 1ST HOSP IP/OBS SF/LOW 40: CPT

## 2023-08-28 PROCEDURE — 80053 COMPREHEN METABOLIC PANEL: CPT

## 2023-08-28 PROCEDURE — 81001 URINALYSIS AUTO W/SCOPE: CPT

## 2023-08-28 PROCEDURE — 87086 URINE CULTURE/COLONY COUNT: CPT

## 2023-08-28 PROCEDURE — 74176 CT ABD & PELVIS W/O CONTRAST: CPT

## 2023-08-28 PROCEDURE — 85025 COMPLETE CBC W/AUTO DIFF WBC: CPT

## 2023-08-28 PROCEDURE — 2580000003 HC RX 258: Performed by: PHYSICIAN ASSISTANT

## 2023-08-28 PROCEDURE — 96374 THER/PROPH/DIAG INJ IV PUSH: CPT

## 2023-08-28 RX ORDER — POLYETHYLENE GLYCOL 3350 17 G/17G
17 POWDER, FOR SOLUTION ORAL DAILY PRN
Status: DISCONTINUED | OUTPATIENT
Start: 2023-08-28 | End: 2023-08-28 | Stop reason: SDUPTHER

## 2023-08-28 RX ORDER — DEXTROSE MONOHYDRATE 100 MG/ML
INJECTION, SOLUTION INTRAVENOUS CONTINUOUS PRN
Status: DISCONTINUED | OUTPATIENT
Start: 2023-08-28 | End: 2023-09-01 | Stop reason: HOSPADM

## 2023-08-28 RX ORDER — AMLODIPINE BESYLATE 5 MG/1
5 TABLET ORAL DAILY
Status: DISCONTINUED | OUTPATIENT
Start: 2023-08-29 | End: 2023-09-01 | Stop reason: HOSPADM

## 2023-08-28 RX ORDER — DOCUSATE SODIUM 100 MG/1
100 CAPSULE, LIQUID FILLED ORAL 2 TIMES DAILY
Status: DISCONTINUED | OUTPATIENT
Start: 2023-08-29 | End: 2023-09-01 | Stop reason: HOSPADM

## 2023-08-28 RX ORDER — SODIUM CHLORIDE 0.9 % (FLUSH) 0.9 %
5-40 SYRINGE (ML) INJECTION EVERY 12 HOURS SCHEDULED
Status: DISCONTINUED | OUTPATIENT
Start: 2023-08-28 | End: 2023-09-01 | Stop reason: HOSPADM

## 2023-08-28 RX ORDER — CETIRIZINE HYDROCHLORIDE 5 MG/1
5 TABLET ORAL NIGHTLY PRN
Status: DISCONTINUED | OUTPATIENT
Start: 2023-08-28 | End: 2023-09-01 | Stop reason: HOSPADM

## 2023-08-28 RX ORDER — CLOPIDOGREL BISULFATE 75 MG/1
75 TABLET ORAL DAILY
Status: DISCONTINUED | OUTPATIENT
Start: 2023-08-28 | End: 2023-09-01 | Stop reason: HOSPADM

## 2023-08-28 RX ORDER — ONDANSETRON 2 MG/ML
4 INJECTION INTRAMUSCULAR; INTRAVENOUS EVERY 6 HOURS PRN
Status: DISCONTINUED | OUTPATIENT
Start: 2023-08-28 | End: 2023-09-01 | Stop reason: HOSPADM

## 2023-08-28 RX ORDER — FLUTICASONE PROPIONATE 50 MCG
2 SPRAY, SUSPENSION (ML) NASAL DAILY
Status: DISCONTINUED | OUTPATIENT
Start: 2023-08-28 | End: 2023-08-28

## 2023-08-28 RX ORDER — CALCITRIOL 0.25 UG/1
0.25 CAPSULE, LIQUID FILLED ORAL DAILY
Status: DISCONTINUED | OUTPATIENT
Start: 2023-08-29 | End: 2023-09-01 | Stop reason: HOSPADM

## 2023-08-28 RX ORDER — POLYETHYLENE GLYCOL 3350 17 G/17G
17 POWDER, FOR SOLUTION ORAL DAILY PRN
Status: DISCONTINUED | OUTPATIENT
Start: 2023-08-28 | End: 2023-09-01 | Stop reason: HOSPADM

## 2023-08-28 RX ORDER — SEVELAMER CARBONATE 800 MG/1
800 TABLET, FILM COATED ORAL
Status: DISCONTINUED | OUTPATIENT
Start: 2023-08-29 | End: 2023-09-01 | Stop reason: HOSPADM

## 2023-08-28 RX ORDER — FENTANYL CITRATE 50 UG/ML
25 INJECTION, SOLUTION INTRAMUSCULAR; INTRAVENOUS ONCE
Status: COMPLETED | OUTPATIENT
Start: 2023-08-28 | End: 2023-08-28

## 2023-08-28 RX ORDER — MONTELUKAST SODIUM 10 MG/1
10 TABLET ORAL DAILY
Status: DISCONTINUED | OUTPATIENT
Start: 2023-08-28 | End: 2023-09-01 | Stop reason: HOSPADM

## 2023-08-28 RX ORDER — ACETAMINOPHEN 325 MG/1
650 TABLET ORAL EVERY 6 HOURS PRN
Status: DISCONTINUED | OUTPATIENT
Start: 2023-08-28 | End: 2023-09-01 | Stop reason: HOSPADM

## 2023-08-28 RX ORDER — ASPIRIN 81 MG/1
81 TABLET, CHEWABLE ORAL DAILY
Status: DISCONTINUED | OUTPATIENT
Start: 2023-08-28 | End: 2023-09-01 | Stop reason: HOSPADM

## 2023-08-28 RX ORDER — TRAZODONE HYDROCHLORIDE 50 MG/1
50 TABLET ORAL NIGHTLY PRN
Status: DISCONTINUED | OUTPATIENT
Start: 2023-08-28 | End: 2023-09-01 | Stop reason: HOSPADM

## 2023-08-28 RX ORDER — ACETAMINOPHEN 650 MG/1
650 SUPPOSITORY RECTAL EVERY 6 HOURS PRN
Status: DISCONTINUED | OUTPATIENT
Start: 2023-08-28 | End: 2023-09-01 | Stop reason: HOSPADM

## 2023-08-28 RX ORDER — HEPARIN SODIUM 5000 [USP'U]/ML
5000 INJECTION, SOLUTION INTRAVENOUS; SUBCUTANEOUS 2 TIMES DAILY
Status: DISCONTINUED | OUTPATIENT
Start: 2023-08-28 | End: 2023-09-01 | Stop reason: HOSPADM

## 2023-08-28 RX ORDER — SODIUM CHLORIDE 9 MG/ML
INJECTION, SOLUTION INTRAVENOUS PRN
Status: DISCONTINUED | OUTPATIENT
Start: 2023-08-28 | End: 2023-09-01 | Stop reason: HOSPADM

## 2023-08-28 RX ORDER — CALCIUM POLYCARBOPHIL 625 MG 625 MG/1
625 TABLET ORAL DAILY
Status: DISCONTINUED | OUTPATIENT
Start: 2023-08-29 | End: 2023-09-01 | Stop reason: HOSPADM

## 2023-08-28 RX ORDER — BUPROPION HYDROCHLORIDE 300 MG/1
300 TABLET ORAL DAILY
Status: DISCONTINUED | OUTPATIENT
Start: 2023-08-29 | End: 2023-09-01 | Stop reason: HOSPADM

## 2023-08-28 RX ORDER — BUPROPION HYDROCHLORIDE 150 MG/1
150 TABLET ORAL DAILY
Status: DISCONTINUED | OUTPATIENT
Start: 2023-08-29 | End: 2023-09-01 | Stop reason: HOSPADM

## 2023-08-28 RX ORDER — HYDROCODONE BITARTRATE AND ACETAMINOPHEN 7.5; 325 MG/1; MG/1
1 TABLET ORAL EVERY 4 HOURS PRN
Status: DISCONTINUED | OUTPATIENT
Start: 2023-08-28 | End: 2023-09-01 | Stop reason: HOSPADM

## 2023-08-28 RX ORDER — ONDANSETRON 4 MG/1
4 TABLET, ORALLY DISINTEGRATING ORAL EVERY 8 HOURS PRN
Status: DISCONTINUED | OUTPATIENT
Start: 2023-08-28 | End: 2023-09-01 | Stop reason: HOSPADM

## 2023-08-28 RX ORDER — FOLIC ACID 1 MG/1
500 TABLET ORAL DAILY
Status: DISCONTINUED | OUTPATIENT
Start: 2023-08-29 | End: 2023-09-01 | Stop reason: HOSPADM

## 2023-08-28 RX ORDER — ATORVASTATIN CALCIUM 10 MG/1
10 TABLET, FILM COATED ORAL DAILY
Status: DISCONTINUED | OUTPATIENT
Start: 2023-08-29 | End: 2023-09-01 | Stop reason: HOSPADM

## 2023-08-28 RX ORDER — SODIUM CHLORIDE 0.9 % (FLUSH) 0.9 %
5-40 SYRINGE (ML) INJECTION PRN
Status: DISCONTINUED | OUTPATIENT
Start: 2023-08-28 | End: 2023-09-01 | Stop reason: HOSPADM

## 2023-08-28 RX ADMIN — SODIUM CHLORIDE, PRESERVATIVE FREE 10 ML: 5 INJECTION INTRAVENOUS at 23:11

## 2023-08-28 RX ADMIN — CETIRIZINE HYDROCHLORIDE 5 MG: 5 TABLET ORAL at 22:49

## 2023-08-28 RX ADMIN — FENTANYL CITRATE 25 MCG: 50 INJECTION, SOLUTION INTRAMUSCULAR; INTRAVENOUS at 16:34

## 2023-08-28 RX ADMIN — HYDROCODONE BITARTRATE AND ACETAMINOPHEN 1 TABLET: 7.5; 325 TABLET ORAL at 21:33

## 2023-08-28 RX ADMIN — MONTELUKAST SODIUM 10 MG: 10 TABLET ORAL at 22:49

## 2023-08-28 RX ADMIN — CEFTRIAXONE SODIUM 1000 MG: 1 INJECTION, POWDER, FOR SOLUTION INTRAMUSCULAR; INTRAVENOUS at 17:42

## 2023-08-28 RX ADMIN — TRAZODONE HYDROCHLORIDE 50 MG: 50 TABLET ORAL at 21:33

## 2023-08-28 ASSESSMENT — PAIN SCALES - GENERAL
PAINLEVEL_OUTOF10: 9
PAINLEVEL_OUTOF10: 4
PAINLEVEL_OUTOF10: 4
PAINLEVEL_OUTOF10: 6
PAINLEVEL_OUTOF10: 6
PAINLEVEL_OUTOF10: 9
PAINLEVEL_OUTOF10: 3

## 2023-08-28 ASSESSMENT — PAIN DESCRIPTION - LOCATION
LOCATION: ABDOMEN;FLANK
LOCATION: ABDOMEN;FLANK
LOCATION: ABDOMEN
LOCATION: BACK;FLANK

## 2023-08-28 ASSESSMENT — PAIN DESCRIPTION - DESCRIPTORS
DESCRIPTORS: ACHING;SORE
DESCRIPTORS: ACHING;SORE

## 2023-08-28 ASSESSMENT — PAIN DESCRIPTION - ORIENTATION
ORIENTATION: LEFT

## 2023-08-28 ASSESSMENT — LIFESTYLE VARIABLES
HOW OFTEN DO YOU HAVE A DRINK CONTAINING ALCOHOL: NEVER
HOW MANY STANDARD DRINKS CONTAINING ALCOHOL DO YOU HAVE ON A TYPICAL DAY: PATIENT DOES NOT DRINK

## 2023-08-28 ASSESSMENT — ENCOUNTER SYMPTOMS
BACK PAIN: 1
ABDOMINAL PAIN: 1

## 2023-08-28 ASSESSMENT — PAIN - FUNCTIONAL ASSESSMENT
PAIN_FUNCTIONAL_ASSESSMENT: 0-10
PAIN_FUNCTIONAL_ASSESSMENT: 0-10

## 2023-08-28 NOTE — ED NOTES
Patient resting in bed with family at bedside, denies any further needs or concerns at this time. Call light in reach. Will continue to monitor.       Leanna Robles RN  08/28/23 2018

## 2023-08-28 NOTE — ED NOTES
Pt transported to [facility] on cart in stable condition. Floor contacted before transport and spoke with [facility].      Beatriz Salas  08/28/23 [id number]

## 2023-08-28 NOTE — H&P
Hospitalist History & Physical    Patient: Lenora Ramirez    Unit/Bed:07/007A  YOB: 1955  MRN: [id number]   Acct: [de-identified]   PCP: Daren Quiros MD  Code Status: Prior    Date of Service: Pt seen/examined on 08/28/23 and admitted to Inpatient with expected LOS greater than two midnights due to medical therapy. Chief Complaint: flank pain    Assessment/Plan:    Left hydronephrosis, hydroureter:   CT showed moderate left sided hydronephrosis and left hydroureter to the level of the mid left ureter, not identified beyond this point. Obstructing mid or distal left ureteral calculus not ruled out. Pt received IV ceftriaxone x 1 ED. Consult Urology. NPO at midnight. Analgesics, antiemetics PRN. Strict I/Os    ESRD on HD: Oliguric. Follows with Dr. Amandeep Scanlon, consulted. HAGMA: Nephrology consulted. Should be corrected with dialysis. Essential HTN: Resume home amlodipine,     Chronic HFpEF: Echo 05/2023 EF 60%, G1DD. Volume primarily managed by dialysis. Hx ischemic CVA: On ASA, Plavix, statin. - Hold ASA/Plavix for #1. Residual left sided weakness. History of Present Illness: Lenora Ramirez is a 69 y.o. female with PMHx of ESRD, CVA, IBS, AMISH, anxiety who presented to [facility] with chief complaint of left flank pain. Patient reports abdominal pain starting a few days ago. Pain is constant, no alleviating/aggravating factors. Associated with decreased appetite. Denies fever/chills, SOB, CP, N/V/D. Denies dysuria, urgency or frequency. CT scan in ED showed moderate left sided hydronephrosis and left hydroureter to the level of the mid left ureter, not identified beyond this point. An obstructing mid or distal left ureteral calculus may be present. UA shows mod leukocyte esterase, blood and protein; no nitrites or bacteria seen. Pt was given IV ceftriaxone in ED. Urology contacted by ED provider who recommended admission for possible stent placement tomorrow.

## 2023-08-28 NOTE — ED NOTES
Patient resting in bed watching television, no distress noted. Respirations easy and unlabored. Denies any further needs or concerns. Call light remains in reach.       Leanna Robles RN  08/28/23 7735

## 2023-08-28 NOTE — CONSULTS
[facility]          [address]        [address]        [facility], [address]        [phone]           Inpatient Initial consult note         8/28/2023 6:02 PM      Patient Name:   María Elena Edmonds  YOB: 1955  Primary Care Physician:  Manuela Raymundo MD   Admit Date:    8/28/2023  1:55 PM    Consultation requested by : Zaira Barron PA-C    Reason for Consult : Nephrology following for ESRD on hemodialysis. History of presenting illness : María Elena Edmonds is a 69 y.o.   female with Past Medical History as stated below presented with a chief complaint of Abdominal Pain and Back Pain   on 8/28/2023 . Patient presented with chief complaints of abdominal pain and some back pain which has started around 3 days ago gradually getting worse symptoms started after she was dialyzed on Friday. Patient constant she was having some diarrhea as well no fevers or chills denies any chest pain or shortness of breath. No other modifying factors. Patient is an ESRD patient on dialysis Monday Wednesday Friday she missed her dialysis today she presented to the ED.   She had a CT scan done which showed that she has moderate left-sided hydronephrosis and obstructing ureteral calculus noted as well she is being planned for cystoscopy with possible stent placement tomorrow    Past History:  Past Medical History:   Diagnosis Date    Allergic rhinitis     Anxiety     CVA (cerebral infarction)     Depression     Fibromyalgia     Headache(784.0)     Hyperlipidemia     Hypertension     Irritable bowel syndrome     Kidney failure     Unspecified cerebral artery occlusion with cerebral infarction     Unspecified sleep apnea      Past Surgical History:   Procedure Laterality Date    CARPAL TUNNEL RELEASE Right     COLONOSCOPY  2012    [facility]    CT BIOPSY RENAL  12/28/2022    [facility]    ENDOSCOPY, COLON, DIAGNOSTIC  unsure    EYE SURGERY      lasik grade 1 diastolic dysfunction    Assessment and Plan:  Renal -ESRD on hemodialysis Monday Wednesday Friday  Volume status electrolytes stable patient did miss his dialysis treatment today  However no acute need for dialysis today. We will reevaluate in the morning. Electrolytes -within normal limits  Metabolic acidosis will be corrected with dialysis follow for now  Anemia of renal dysfunction stable  Right-sided hydronephrosis cystoscopy and stent placement tomorrow  Chronic constipation  Essential hypertension  Meds reviewed and discussed with patient    Alexa Uribe MD  [facility]    This report has been created using voice recognition software.  It may contain minor errors which are inherent in voice recognition technology

## 2023-08-28 NOTE — ED NOTES
ED to inpatient nurses report    Chief Complaint   Patient presents with    Abdominal Pain    Back Pain      Present to ED from home  LOC: alert and orientated to name, place, date  Vital signs   Vitals:    08/28/23 1413 08/28/23 1507 08/28/23 1633 08/28/23 1635   BP: (!) 152/66 (!) 157/72     Pulse: 86 81 76 88   Resp: 20 18  17   Temp:       TempSrc:       SpO2: 100% 100%  99%   Weight: 98 lb (44.5 kg)      Height: 5' (1.524 m)         Oxygen Baseline room air    Current needs required none Bipap/Cpap No  LDAs:   Peripheral IV 08/28/23 Right Antecubital (Active)   Site Assessment Clean, dry & intact 08/28/23 1417     Mobility: Requires assistance * 1  Pending ED orders: none  Present condition: stable    C-SSRS Risk of Suicide: No Risk  Swallow Screening    Preferred Language: Prem     Electronically signed by Danny Robles RN on 8/28/2023 at 4:45 PM      Leanna Robles RN  08/28/23 3661

## 2023-08-28 NOTE — ED NOTES
Presents to ED with complaints of low abdominal pain that radiates to her back. Pt states she felt it was a UTI so she has been drinking cranberry juice which normally resolves her UTIs. Pt reports her symptoms remain ongoing. PT states she did not get dialysis today due to not feeling well.      Leanna Robles, FLY  08/28/23 0023

## 2023-08-28 NOTE — PROGRESS NOTES
Patient admitted to [phone] by wheelchair from the ED,  patient came with an int and on room air

## 2023-08-28 NOTE — ED PROVIDER NOTES
[facility] EMERGENCY DEPT    EMERGENCY MEDICINE      Pt Name: María Elena Edmonds  MRN: [id number]  [address] [facility] 1955  Date of evaluation: 8/28/2023  Treating Physician: Brenda Sheets MD    CHIEF COMPLAINT       Chief Complaint   Patient presents with    Abdominal Pain    Back Pain     History obtained from chart review and the patient. HISTORY OF PRESENT ILLNESS    HPI    María Elena Edmonds is a 69 y.o. female with PMH of renal failure, HD MWF, depression, HLD, anxiety, CVA presents to the emergency department for evaluation of abdominal and back pain. Patient described the pain as starting 3 days ago after she dialyzed on Friday. Pain is constant and circumferential around her abdomen and back. Patient did endorse diarrhea on Saturday but has since returned to normal BMs. Patient denied any fever, nausea, vomiting, chest pain, shortness of breath, dysuria, cough. The patient has no other acute complaints at this time.     PAST MEDICAL AND SURGICAL HISTORY     Past Medical History:   Diagnosis Date    Allergic rhinitis     Anxiety     CVA (cerebral infarction)     Depression     Fibromyalgia     Headache(784.0)     Hyperlipidemia     Hypertension     Irritable bowel syndrome     Kidney failure     Unspecified cerebral artery occlusion with cerebral infarction     Unspecified sleep apnea        Past Surgical History:   Procedure Laterality Date    CARPAL TUNNEL RELEASE Right     COLONOSCOPY  2012    [facility]    CT BIOPSY RENAL  12/28/2022    CT BIOPSY RENAL 12/28/2022 [facility] CT SCAN    ENDOSCOPY, COLON, DIAGNOSTIC  unsure    EYE SURGERY      lasik    HEMORRHOID SURGERY  unsure    HYSTERECTOMY (CERVIX STATUS UNKNOWN)      age 34    NECK SURGERY  18  years ago    fusion    OVARY REMOVAL Bilateral     age 34    SINUS SURGERY  10 years ago    TUBAL LIGATION         CURRENT MEDICATIONS     Previous Medications    AMLODIPINE (NORVASC) 5 MG TABLET    Take 1 tablet by mouth daily    ASPIRIN 81 MG TABLET    Take 1 tablet Man Delacruz MD, 08/28/23, 4:41 PM        Carrie Garay MD  Resident  08/28/23 2001

## 2023-08-28 NOTE — ED PROVIDER NOTES
[facility]  EMERGENCY MEDICINE ATTENDING ATTESTATION      Evaluation of [place]. Case discussed and care plan developed with resident physician. I agree with the resident physician documentation and plan as documented by him, except if my documentation differs. Patient seen, interviewed and examined by me. I reviewed the medical, surgical, family and social history, medications and allergies. I have reviewed and interpreted all available lab, radiology and ekg results available at the moment. I have reviewed the nursing documentation. Brief H&P   Patient c/o left flank pain similar to previous kidney stones. Patient has end-stage renal disease on dialysis but is not anuric. Physical exam is notable for well appearing, uncomfortable from pain, left CVA tenderness. Medical Decision Making   MDM:   Flank pain, possible kidney stone  Rule out UTI  Plan:   IV line, labs  Imaging  Analgesia  Case discussed with urology who request admission on antibiotics for stent placement in the morning  Observation in the ED while awaiting results    Please see the resident physician completed note for final disposition except as documented on this attestation. I have reviewed and interpreted all available lab, radiology and ekg results available at the moment. Diagnosis, treatment and disposition plans were discussed and agreed upon by patient. This transcription was electronically signed. It was dictated by use of voice recognition software and electronically transcribed. The transcription may contain errors not detected in proofreading.      I performed direct supervision and was present for the critical portion following procedures: None  Critical care time on this case: None    Electronically signed by Denzel Barron MD on 8/28/23 at 4:44 PM EDT        Denzel Barron MD  08/28/23 1314

## 2023-08-29 ENCOUNTER — ANESTHESIA EVENT (OUTPATIENT)
Dept: OPERATING ROOM | Age: 68
End: 2023-08-29
Payer: MEDICARE

## 2023-08-29 ENCOUNTER — ANESTHESIA (OUTPATIENT)
Dept: OPERATING ROOM | Age: 68
End: 2023-08-29
Payer: MEDICARE

## 2023-08-29 LAB
ALBUMIN SERPL BCG-MCNC: 3.7 G/DL (ref 3.5–5.1)
ALP SERPL-CCNC: 105 U/L (ref 38–126)
ALT SERPL W/O P-5'-P-CCNC: 17 U/L (ref 11–66)
ANION GAP SERPL CALC-SCNC: 19 MEQ/L (ref 8–16)
AST SERPL-CCNC: 23 U/L (ref 5–40)
BACTERIA UR CULT: ABNORMAL
BASOPHILS ABSOLUTE: 0.1 THOU/MM3 (ref 0–0.1)
BASOPHILS NFR BLD AUTO: 1.3 %
BILIRUB SERPL-MCNC: 0.2 MG/DL (ref 0.3–1.2)
BUN SERPL-MCNC: 76 MG/DL (ref 7–22)
CALCIUM SERPL-MCNC: 9.8 MG/DL (ref 8.5–10.5)
CHLORIDE SERPL-SCNC: 107 MEQ/L (ref 98–111)
CO2 SERPL-SCNC: 16 MEQ/L (ref 23–33)
CREAT SERPL-MCNC: 7.2 MG/DL (ref 0.4–1.2)
DEPRECATED RDW RBC AUTO: 50.9 FL (ref 35–45)
EKG ATRIAL RATE: 78 BPM
EKG P AXIS: 78 DEGREES
EKG P-R INTERVAL: 156 MS
EKG Q-T INTERVAL: 374 MS
EKG QRS DURATION: 122 MS
EKG QTC CALCULATION (BAZETT): 426 MS
EKG R AXIS: 73 DEGREES
EKG T AXIS: 56 DEGREES
EKG VENTRICULAR RATE: 78 BPM
EOSINOPHIL NFR BLD AUTO: 4.3 %
EOSINOPHILS ABSOLUTE: 0.3 THOU/MM3 (ref 0–0.4)
ERYTHROCYTE [DISTWIDTH] IN BLOOD BY AUTOMATED COUNT: 13.4 % (ref 11.5–14.5)
GFR SERPL CREATININE-BSD FRML MDRD: 6 ML/MIN/1.73M2
GLUCOSE SERPL-MCNC: 77 MG/DL (ref 70–108)
HCT VFR BLD AUTO: 38.9 % (ref 37–47)
HGB BLD-MCNC: 11.5 GM/DL (ref 12–16)
IMM GRANULOCYTES # BLD AUTO: 0.02 THOU/MM3 (ref 0–0.07)
IMM GRANULOCYTES NFR BLD AUTO: 0.3 %
LYMPHOCYTES ABSOLUTE: 2.7 THOU/MM3 (ref 1–4.8)
LYMPHOCYTES NFR BLD AUTO: 39.1 %
MCH RBC QN AUTO: 30.3 PG (ref 26–33)
MCHC RBC AUTO-ENTMCNC: 29.6 GM/DL (ref 32.2–35.5)
MCV RBC AUTO: 102.4 FL (ref 81–99)
MONOCYTES ABSOLUTE: 0.6 THOU/MM3 (ref 0.4–1.3)
MONOCYTES NFR BLD AUTO: 8.7 %
NEUTROPHILS NFR BLD AUTO: 46.3 %
NRBC BLD AUTO-RTO: 0 /100 WBC
ORGANISM: ABNORMAL
PLATELET # BLD AUTO: 193 THOU/MM3 (ref 130–400)
PMV BLD AUTO: 10.3 FL (ref 9.4–12.4)
POTASSIUM SERPL-SCNC: 4.8 MEQ/L (ref 3.5–5.2)
PROT SERPL-MCNC: 6.7 G/DL (ref 6.1–8)
RBC # BLD AUTO: 3.8 MILL/MM3 (ref 4.2–5.4)
SEGMENTED NEUTROPHILS ABSOLUTE COUNT: 3.1 THOU/MM3 (ref 1.8–7.7)
SODIUM SERPL-SCNC: 142 MEQ/L (ref 135–145)
WBC # BLD AUTO: 6.8 THOU/MM3 (ref 4.8–10.8)

## 2023-08-29 PROCEDURE — C1769 GUIDE WIRE: HCPCS | Performed by: UROLOGY

## 2023-08-29 PROCEDURE — 0T778DZ DILATION OF LEFT URETER WITH INTRALUMINAL DEVICE, VIA NATURAL OR ARTIFICIAL OPENING ENDOSCOPIC: ICD-10-PCS | Performed by: UROLOGY

## 2023-08-29 PROCEDURE — C2617 STENT, NON-COR, TEM W/O DEL: HCPCS | Performed by: UROLOGY

## 2023-08-29 PROCEDURE — 6360000002 HC RX W HCPCS: Performed by: NURSE ANESTHETIST, CERTIFIED REGISTERED

## 2023-08-29 PROCEDURE — 85025 COMPLETE CBC W/AUTO DIFF WBC: CPT

## 2023-08-29 PROCEDURE — 80053 COMPREHEN METABOLIC PANEL: CPT

## 2023-08-29 PROCEDURE — 6360000002 HC RX W HCPCS: Performed by: PHYSICIAN ASSISTANT

## 2023-08-29 PROCEDURE — 2709999900 HC NON-CHARGEABLE SUPPLY: Performed by: UROLOGY

## 2023-08-29 PROCEDURE — 90935 HEMODIALYSIS ONE EVALUATION: CPT | Performed by: INTERNAL MEDICINE

## 2023-08-29 PROCEDURE — 1200000000 HC SEMI PRIVATE

## 2023-08-29 PROCEDURE — 3600000012 HC SURGERY LEVEL 2 ADDTL 15MIN: Performed by: UROLOGY

## 2023-08-29 PROCEDURE — 2580000003 HC RX 258: Performed by: NURSE ANESTHETIST, CERTIFIED REGISTERED

## 2023-08-29 PROCEDURE — C1758 CATHETER, URETERAL: HCPCS | Performed by: UROLOGY

## 2023-08-29 PROCEDURE — 2580000003 HC RX 258: Performed by: PHYSICIAN ASSISTANT

## 2023-08-29 PROCEDURE — 90935 HEMODIALYSIS ONE EVALUATION: CPT

## 2023-08-29 PROCEDURE — 3700000001 HC ADD 15 MINUTES (ANESTHESIA): Performed by: UROLOGY

## 2023-08-29 PROCEDURE — 36415 COLL VENOUS BLD VENIPUNCTURE: CPT

## 2023-08-29 PROCEDURE — 3600000002 HC SURGERY LEVEL 2 BASE: Performed by: UROLOGY

## 2023-08-29 PROCEDURE — 5A1D70Z PERFORMANCE OF URINARY FILTRATION, INTERMITTENT, LESS THAN 6 HOURS PER DAY: ICD-10-PCS | Performed by: INTERNAL MEDICINE

## 2023-08-29 PROCEDURE — 6370000000 HC RX 637 (ALT 250 FOR IP): Performed by: PHYSICIAN ASSISTANT

## 2023-08-29 PROCEDURE — 3700000000 HC ANESTHESIA ATTENDED CARE: Performed by: UROLOGY

## 2023-08-29 DEVICE — URETERAL STENT
Type: IMPLANTABLE DEVICE | Status: FUNCTIONAL
Brand: PERCUFLEX™ PLUS

## 2023-08-29 RX ORDER — SODIUM CHLORIDE 9 MG/ML
INJECTION, SOLUTION INTRAVENOUS CONTINUOUS PRN
Status: DISCONTINUED | OUTPATIENT
Start: 2023-08-29 | End: 2023-08-29 | Stop reason: SDUPTHER

## 2023-08-29 RX ORDER — LIDOCAINE HCL/PF 100 MG/5ML
SYRINGE (ML) INJECTION PRN
Status: DISCONTINUED | OUTPATIENT
Start: 2023-08-29 | End: 2023-08-29 | Stop reason: SDUPTHER

## 2023-08-29 RX ORDER — PROPOFOL 10 MG/ML
INJECTION, EMULSION INTRAVENOUS PRN
Status: DISCONTINUED | OUTPATIENT
Start: 2023-08-29 | End: 2023-08-29 | Stop reason: SDUPTHER

## 2023-08-29 RX ADMIN — Medication 100 MG: at 10:49

## 2023-08-29 RX ADMIN — BUPROPION HYDROCHLORIDE 150 MG: 150 TABLET, FILM COATED, EXTENDED RELEASE ORAL at 11:39

## 2023-08-29 RX ADMIN — CALCIUM POLYCARBOPHIL 625 MG: 625 TABLET, FILM COATED ORAL at 11:56

## 2023-08-29 RX ADMIN — SEVELAMER CARBONATE 800 MG: 800 TABLET, FILM COATED ORAL at 16:51

## 2023-08-29 RX ADMIN — MONTELUKAST SODIUM 10 MG: 10 TABLET ORAL at 20:41

## 2023-08-29 RX ADMIN — AMLODIPINE BESYLATE 5 MG: 5 TABLET ORAL at 11:37

## 2023-08-29 RX ADMIN — DOCUSATE SODIUM 100 MG: 100 CAPSULE, LIQUID FILLED ORAL at 11:37

## 2023-08-29 RX ADMIN — HYDROMORPHONE HYDROCHLORIDE 0.5 MG: 1 INJECTION, SOLUTION INTRAMUSCULAR; INTRAVENOUS; SUBCUTANEOUS at 04:04

## 2023-08-29 RX ADMIN — SEVELAMER CARBONATE 800 MG: 800 TABLET, FILM COATED ORAL at 11:37

## 2023-08-29 RX ADMIN — PROPOFOL 10 MG: 10 INJECTION, EMULSION INTRAVENOUS at 10:52

## 2023-08-29 RX ADMIN — SODIUM CHLORIDE: 9 INJECTION, SOLUTION INTRAVENOUS at 10:38

## 2023-08-29 RX ADMIN — CALCITRIOL CAPSULES 0.25 MCG 0.25 MCG: 0.25 CAPSULE ORAL at 11:38

## 2023-08-29 RX ADMIN — SODIUM CHLORIDE, PRESERVATIVE FREE 10 ML: 5 INJECTION INTRAVENOUS at 11:40

## 2023-08-29 RX ADMIN — TRAZODONE HYDROCHLORIDE 50 MG: 50 TABLET ORAL at 20:41

## 2023-08-29 RX ADMIN — DOCUSATE SODIUM 100 MG: 100 CAPSULE, LIQUID FILLED ORAL at 20:41

## 2023-08-29 RX ADMIN — HEPARIN SODIUM 5000 UNITS: 5000 INJECTION INTRAVENOUS; SUBCUTANEOUS at 20:42

## 2023-08-29 RX ADMIN — FOLIC ACID 500 MCG: 1 TABLET ORAL at 11:37

## 2023-08-29 RX ADMIN — CETIRIZINE HYDROCHLORIDE 5 MG: 5 TABLET ORAL at 20:41

## 2023-08-29 RX ADMIN — PROPOFOL 40 MG: 10 INJECTION, EMULSION INTRAVENOUS at 10:50

## 2023-08-29 RX ADMIN — HYDROMORPHONE HYDROCHLORIDE 0.5 MG: 1 INJECTION, SOLUTION INTRAMUSCULAR; INTRAVENOUS; SUBCUTANEOUS at 19:29

## 2023-08-29 RX ADMIN — ATORVASTATIN CALCIUM 10 MG: 10 TABLET, FILM COATED ORAL at 11:42

## 2023-08-29 RX ADMIN — HYDROCODONE BITARTRATE AND ACETAMINOPHEN 1 TABLET: 7.5; 325 TABLET ORAL at 14:45

## 2023-08-29 RX ADMIN — SODIUM CHLORIDE, PRESERVATIVE FREE 10 ML: 5 INJECTION INTRAVENOUS at 19:32

## 2023-08-29 RX ADMIN — ONDANSETRON 4 MG: 2 INJECTION INTRAMUSCULAR; INTRAVENOUS at 19:29

## 2023-08-29 RX ADMIN — BUPROPION HYDROCHLORIDE 300 MG: 150 TABLET, FILM COATED, EXTENDED RELEASE ORAL at 11:38

## 2023-08-29 ASSESSMENT — PAIN SCALES - GENERAL
PAINLEVEL_OUTOF10: 9
PAINLEVEL_OUTOF10: 9
PAINLEVEL_OUTOF10: 2
PAINLEVEL_OUTOF10: 10
PAINLEVEL_OUTOF10: 6
PAINLEVEL_OUTOF10: 9
PAINLEVEL_OUTOF10: 3

## 2023-08-29 ASSESSMENT — PAIN DESCRIPTION - LOCATION
LOCATION: ABDOMEN;FLANK
LOCATION: ABDOMEN
LOCATION: ABDOMEN

## 2023-08-29 ASSESSMENT — ENCOUNTER SYMPTOMS: SHORTNESS OF BREATH: 1

## 2023-08-29 ASSESSMENT — PAIN DESCRIPTION - ORIENTATION: ORIENTATION: LEFT

## 2023-08-29 ASSESSMENT — PAIN DESCRIPTION - DESCRIPTORS
DESCRIPTORS: SHARP;SORE
DESCRIPTORS: ACHING
DESCRIPTORS: SHARP;SORE

## 2023-08-29 ASSESSMENT — PAIN - FUNCTIONAL ASSESSMENT: PAIN_FUNCTIONAL_ASSESSMENT: ACTIVITIES ARE NOT PREVENTED

## 2023-08-29 NOTE — ANESTHESIA PRE PROCEDURE
COVID-19 Screening (If Applicable):   Lab Results   Component Value Date/Time    COVID19 NOT DETECTED 04/28/2023 03:05 PM           Anesthesia Evaluation  Patient summary reviewed  Airway: Mallampati: III  TM distance: >3 FB   Neck ROM: full  Mouth opening: > = 3 FB   Dental:          Pulmonary:   (+) shortness of breath:  sleep apnea:                             Cardiovascular:    (+) hypertension:, CHF:, TUTTLE:,       ECG reviewed      Echocardiogram reviewed                  Neuro/Psych:   (+) CVA:, neuromuscular disease:, headaches:, psychiatric history:            GI/Hepatic/Renal:             Endo/Other:                     Abdominal:             Vascular: Other Findings:           Anesthesia Plan      MAC     ASA 3       Induction: intravenous. Anesthetic plan and risks discussed with patient. Plan discussed with CRNA. Alejandra Gomez.  [address] Fabian Steinberg DO   8/29/2023

## 2023-08-29 NOTE — PROGRESS NOTES
Hospitalist Progress Note    Patient: [facility]    YOB: 1955  Unit/Bed:6A-16/016-A  Date of Admission: 8/28/2023    Assessment/Plan:    Left hydronephrosis, hydroureter:   CT showed moderate left sided hydronephrosis and left hydroureter to the level of the mid left ureter, not identified beyond this point. Obstructing mid or distal left ureteral calculus not ruled out. Urology consulted. S/p cystoscopy, left ureteral stent placement 8/29. ESRD on HD: Oliguric. Follows with Dr. Nellie Wang, consulted. SHAGGY: Nephrology consulted. Should be corrected with dialysis. Essential HTN: Resume home amlodipine, monitor. Chronic HFpEF: Echo 05/2023 EF 60%, G1DD. Volume primarily managed by dialysis. Hx ischemic CVA: On ASA, Plavix, statin. Residual mild left sided weakness. Resume ASA, Plavix when okay with Urology. Chief Complaint: flank pain    HPI / Hospital Course: [facility] is a 69 y.o. female with PMHx of ESRD, CVA, IBS, AMISH, anxiety who presented to [facility] with chief complaint of left flank pain. CT scan in ED showed moderate left sided hydronephrosis and left hydroureter to the level of the mid left ureter, not identified beyond this point. An obstructing mid or distal left ureteral calculus may be present. UA shows mod leukocyte esterase, blood and protein; no nitrites or bacteria seen. Pt was given IV ceftriaxone in ED. Pt admitted to Hospitalist service. Nephrology consulted for ESRD. Urology consulted, plan for cystoscopy with left ureteral stent placement today. Subjective (past 24 hours): Patient seen after cystoscopy. Tolerated well. She reports mild lightheadedness but is ambulating in the room. Reports pain with urination, left flank tenderness. No fever/chills, sob, cp, abd pain, nvd. Monitor overnight, possible discharge tomorrow after dialysis. ROS: Pertinent positives as noted in HPI. All other systems reviewed and negative.       Past

## 2023-08-29 NOTE — PLAN OF CARE
Problem: Discharge Planning  Goal: Discharge to home or other facility with appropriate resources  Outcome: Progressing  Flowsheets (Taken 8/28/2023 2000 by Fabi Young RN)  Discharge to home or other facility with appropriate resources:   Identify barriers to discharge with patient and caregiver   Arrange for needed discharge resources and transportation as appropriate   Identify discharge learning needs (meds, wound care, etc)   Refer to discharge planning if patient needs post-hospital services based on physician order or complex needs related to functional status, cognitive ability or social support system     Problem: Pain  Goal: Verbalizes/displays adequate comfort level or baseline comfort level  Outcome: Progressing  Flowsheets (Taken 8/29/2023 0146 by Fabi Young RN)  Verbalizes/displays adequate comfort level or baseline comfort level:   Encourage patient to monitor pain and request assistance   Assess pain using appropriate pain scale   Administer analgesics based on type and severity of pain and evaluate response   Implement non-pharmacological measures as appropriate and evaluate response   Notify Licensed Independent Practitioner if interventions unsuccessful or patient reports new pain     Problem: Safety - Adult  Goal: Free from fall injury  Outcome: Progressing  Flowsheets (Taken 8/29/2023 0146 by Fabi Young RN)  Free From Fall Injury: Instruct family/caregiver on patient safety     Problem: Chronic Conditions and Co-morbidities  Goal: Patient's chronic conditions and co-morbidity symptoms are monitored and maintained or improved  Outcome: Progressing  Flowsheets (Taken 8/29/2023 1732)  Care Plan - Patient's Chronic Conditions and Co-Morbidity Symptoms are Monitored and Maintained or Improved:   Monitor and assess patient's chronic conditions and comorbid symptoms for stability, deterioration, or improvement   Collaborate with multidisciplinary team to address chronic and

## 2023-08-29 NOTE — ANESTHESIA POSTPROCEDURE EVALUATION
Department of Anesthesiology  Postprocedure Note    Patient: Natalia Ng  MRN: [id number]  YOB: 1955  Date of evaluation: 8/29/2023      Procedure Summary     Date: 08/29/23 Room / Location: [facility]  / [facility] OR    Anesthesia Start: 1040 Anesthesia Stop: 1054    Procedure: CYSTOSCOPY, LEFT URETERAL STENT PLACEMENT (Left: Ureter) Diagnosis:       Abdominal pain, unspecified abdominal location      (Abdominal pain, unspecified abdominal location [R10.9])    Surgeons: Brenda Maldonado MD Responsible Provider: Matthew Marinelli DO    Anesthesia Type: MAC ASA Status: 3          Anesthesia Type: No value filed.     Cosmo Phase I:      Cosmo Phase II:        Anesthesia Post Evaluation    Patient location during evaluation: bedside  Patient participation: complete - patient participated  Level of consciousness: awake  Airway patency: patent  Nausea & Vomiting: no vomiting and no nausea  Complications: no  Cardiovascular status: hemodynamically stable  Respiratory status: acceptable  Hydration status: stable  Pain management: adequate

## 2023-08-29 NOTE — BRIEF OP NOTE
Brief Postoperative Note      Patient: Santos Devine  YOB: 1955  MRN: [id number]    Date of Procedure: 8/29/2023    Pre-Op Diagnosis Codes:     * Abdominal pain, unspecified abdominal location [R10.9]    Post-Op Diagnosis: Same       Procedure(s):  CYSTOSCOPY, LEFT URETERAL STENT PLACEMENT    Surgeon(s):  Armando Paz MD    Assistant:  * No surgical staff found *    Anesthesia: General    Estimated Blood Loss (mL): Minimal    Complications: None    Specimens:   * No specimens in log *    Implants:  Implant Name Type Inv. Item Serial No.  Lot No. LRB No. Used Action   STENT URET 6FR L26CM HYDR+ PGTL [facility] - [id number]  [facility] 6FR L26CM HYDR+ PGTL TAPR TIP GRAD BLDR MRK LO  [employer] [facility] UROLOGY- [id number] Left 1 Implanted         Drains: * No LDAs found *    Findings: stent placed.  Needs ureteroscopy poss biopsy (15)      Electronically signed by Duyen Connell MD on 8/29/2023 at 11:05 AM

## 2023-08-29 NOTE — PLAN OF CARE
Problem: Discharge Planning  Goal: Discharge to home or other facility with appropriate resources  Outcome: Progressing  Flowsheets (Taken 8/28/2023 2000)  Discharge to home or other facility with appropriate resources:   Identify barriers to discharge with patient and caregiver   Arrange for needed discharge resources and transportation as appropriate   Identify discharge learning needs (meds, wound care, etc)   Refer to discharge planning if patient needs post-hospital services based on physician order or complex needs related to functional status, cognitive ability or social support system     Problem: Pain  Goal: Verbalizes/displays adequate comfort level or baseline comfort level  Outcome: Progressing  Flowsheets (Taken 8/29/2023 0146)  Verbalizes/displays adequate comfort level or baseline comfort level:   Encourage patient to monitor pain and request assistance   Assess pain using appropriate pain scale   Administer analgesics based on type and severity of pain and evaluate response   Implement non-pharmacological measures as appropriate and evaluate response   Notify Licensed Independent Practitioner if interventions unsuccessful or patient reports new pain     Problem: Safety - Adult  Goal: Free from fall injury  Outcome: Progressing  Flowsheets (Taken 8/29/2023 0146)  Free From Fall Injury: Instruct family/caregiver on patient safety   Care plan reviewed with patient and her . Patient and her  verbalized understanding of the plan of care and contribute to goal setting.

## 2023-08-29 NOTE — CARE COORDINATION
Case Management Assessment  Initial Evaluation    Date/Time of Evaluation: 8/29/2023 2:54 PM  Assessment Completed by: Ana Zuñiga RN    If patient is discharged prior to next notation, then this note serves as note for discharge by case management. Patient Name: Gina Pena                   YOB: 1955  Diagnosis: Hydronephrosis [N13.30]  Ureterolithiasis [N20.1]                   Date / Time: 8/28/2023  1:55 PM  Location: [address] [place] [address]     Patient Admission Status: Inpatient   Readmission Risk Low 0-14, Mod 15-19), High > 20: Readmission Risk Score: 21.5    Current PCP: Tyrese Mccoy MD  PCP verified by CM? Yes    Chart Reviewed: Yes      History Provided by: Patient  Patient Orientation: Alert and Oriented    Patient Cognition: Alert    Hospitalization in the last 30 days (Readmission):  No    If yes, Readmission Assessment in CM Navigator will be completed. Advance Directives:      Code Status: Full Code   Patient's Primary Decision Maker is: Named in Scanned ACP Document    Primary Decision Maker: Aubree Bernstein Spouse - [phone]    Discharge Planning:    Patient lives with: Spouse/Significant Other Type of Home: House  Primary Care Giver: Self  Patient Support Systems include: Spouse/Significant Other   Current Financial resources: Medicare, Other (Comment) ([facility] 2nd insurance)  Current community resources:  Other (Comment) ( at [facility] with chair time 1500)  Current services prior to admission: Other (Comment), Durable Medical Equipment (HD at [facility] with 1500 chair time)            Current DME: Marcy Donovan            Type of Home Care services:  None    ADLS  Prior functional level: Assistance with the following:, Bathing, Dressing, Housework, Cooking  Current functional level: Assistance with the following:, Bathing, Dressing, Cooking, Housework    Family can provide assistance at DC: No  Would you like Case Management to discuss the

## 2023-08-29 NOTE — CONSULTS
[address] [facility] 6A PEDI/MED SURG  [address] [facility] [address]  Dept: [phone]  Loc: [phone]  Visit Date: 8/28/2023    Urology Consult Note    Reason for Consult:  Left Hydroureteronephrosis and Flank Pain  Requesting Physician:      History Obtained From:  Patient and EMR    Chief Complaint: Left Flank Pain    HISTORY OF PRESENT ILLNESS:                The patient is a 69 y.o. female with significant past medical history as listed below who presented to the [facility] ED earlier today for evaluation of left abdominal (flank) pain. Urology was consulted for management of the patient's left hydroureteronephrosis. Pain onset 3 days ago and has gradually worsened. Also with recent bout of diarrhea. Zara Maloney denies any fever or chills, CP, or SOB. Dialyzed on Friday (ESRD)- M, W, F schedule.      Past Medical History:        Diagnosis Date    Allergic rhinitis     Anxiety     CVA (cerebral infarction)     Depression     Fibromyalgia     Headache(784.0)     Hyperlipidemia     Hypertension     Irritable bowel syndrome     Kidney failure     Unspecified cerebral artery occlusion with cerebral infarction     Unspecified sleep apnea      Past Surgical History:        Procedure Laterality Date    CARPAL TUNNEL RELEASE Right     COLONOSCOPY  2012    [facility]    CT BIOPSY RENAL  12/28/2022    CT BIOPSY RENAL 12/28/2022 [facility] CT SCAN    ENDOSCOPY, COLON, DIAGNOSTIC  unsure    EYE SURGERY      lasik    HEMORRHOID SURGERY  unsure    HYSTERECTOMY (CERVIX STATUS UNKNOWN)      age 34    NECK SURGERY  18  years ago    fusion    OVARY REMOVAL Bilateral     age 34    SINUS SURGERY  10 years ago    TUBAL LIGATION         Social History     Socioeconomic History    Marital status:      Spouse name: Cullen Shepherd    Number of children: 2    Years of education: Not on file    Highest education level: Not on file   Occupational History    Occupation: unemployed at present time   Tobacco Use

## 2023-08-30 ENCOUNTER — TELEPHONE (OUTPATIENT)
Dept: UROLOGY | Age: 68
End: 2023-08-30

## 2023-08-30 LAB
ANION GAP SERPL CALC-SCNC: 14 MEQ/L (ref 8–16)
BUN SERPL-MCNC: 49 MG/DL (ref 7–22)
CALCIUM SERPL-MCNC: 9.9 MG/DL (ref 8.5–10.5)
CHLORIDE SERPL-SCNC: 102 MEQ/L (ref 98–111)
CO2 SERPL-SCNC: 24 MEQ/L (ref 23–33)
CREAT SERPL-MCNC: 5.5 MG/DL (ref 0.4–1.2)
DEPRECATED RDW RBC AUTO: 47.4 FL (ref 35–45)
ERYTHROCYTE [DISTWIDTH] IN BLOOD BY AUTOMATED COUNT: 13.2 % (ref 11.5–14.5)
GFR SERPL CREATININE-BSD FRML MDRD: 8 ML/MIN/1.73M2
GLUCOSE SERPL-MCNC: 95 MG/DL (ref 70–108)
HCT VFR BLD AUTO: 35.6 % (ref 37–47)
HGB BLD-MCNC: 11 GM/DL (ref 12–16)
MCH RBC QN AUTO: 30.2 PG (ref 26–33)
MCHC RBC AUTO-ENTMCNC: 30.9 GM/DL (ref 32.2–35.5)
MCV RBC AUTO: 97.8 FL (ref 81–99)
PLATELET # BLD AUTO: 185 THOU/MM3 (ref 130–400)
PMV BLD AUTO: 10.2 FL (ref 9.4–12.4)
POTASSIUM SERPL-SCNC: 4.7 MEQ/L (ref 3.5–5.2)
RBC # BLD AUTO: 3.64 MILL/MM3 (ref 4.2–5.4)
SODIUM SERPL-SCNC: 140 MEQ/L (ref 135–145)
WBC # BLD AUTO: 5.8 THOU/MM3 (ref 4.8–10.8)

## 2023-08-30 PROCEDURE — 1200000000 HC SEMI PRIVATE

## 2023-08-30 PROCEDURE — 90935 HEMODIALYSIS ONE EVALUATION: CPT

## 2023-08-30 PROCEDURE — 99233 SBSQ HOSP IP/OBS HIGH 50: CPT | Performed by: INTERNAL MEDICINE

## 2023-08-30 PROCEDURE — 6360000002 HC RX W HCPCS: Performed by: PHYSICIAN ASSISTANT

## 2023-08-30 PROCEDURE — 02HV33Z INSERTION OF INFUSION DEVICE INTO SUPERIOR VENA CAVA, PERCUTANEOUS APPROACH: ICD-10-PCS | Performed by: INTERNAL MEDICINE

## 2023-08-30 PROCEDURE — 6370000000 HC RX 637 (ALT 250 FOR IP): Performed by: INTERNAL MEDICINE

## 2023-08-30 PROCEDURE — 6370000000 HC RX 637 (ALT 250 FOR IP): Performed by: PHYSICIAN ASSISTANT

## 2023-08-30 PROCEDURE — 99232 SBSQ HOSP IP/OBS MODERATE 35: CPT | Performed by: INTERNAL MEDICINE

## 2023-08-30 PROCEDURE — 85027 COMPLETE CBC AUTOMATED: CPT

## 2023-08-30 PROCEDURE — 2580000003 HC RX 258: Performed by: PHYSICIAN ASSISTANT

## 2023-08-30 PROCEDURE — 36415 COLL VENOUS BLD VENIPUNCTURE: CPT

## 2023-08-30 PROCEDURE — 80048 BASIC METABOLIC PNL TOTAL CA: CPT

## 2023-08-30 RX ORDER — CEPHALEXIN 250 MG/1
250 CAPSULE ORAL EVERY 12 HOURS SCHEDULED
Status: DISCONTINUED | OUTPATIENT
Start: 2023-08-30 | End: 2023-09-01 | Stop reason: HOSPADM

## 2023-08-30 RX ADMIN — ATORVASTATIN CALCIUM 10 MG: 10 TABLET, FILM COATED ORAL at 08:15

## 2023-08-30 RX ADMIN — HYDROCODONE BITARTRATE AND ACETAMINOPHEN 1 TABLET: 7.5; 325 TABLET ORAL at 08:14

## 2023-08-30 RX ADMIN — MONTELUKAST SODIUM 10 MG: 10 TABLET ORAL at 20:54

## 2023-08-30 RX ADMIN — SODIUM CHLORIDE, PRESERVATIVE FREE 10 ML: 5 INJECTION INTRAVENOUS at 08:18

## 2023-08-30 RX ADMIN — BUPROPION HYDROCHLORIDE 150 MG: 150 TABLET, FILM COATED, EXTENDED RELEASE ORAL at 08:15

## 2023-08-30 RX ADMIN — HEPARIN SODIUM 5000 UNITS: 5000 INJECTION INTRAVENOUS; SUBCUTANEOUS at 20:54

## 2023-08-30 RX ADMIN — CALCIUM POLYCARBOPHIL 625 MG: 625 TABLET, FILM COATED ORAL at 08:16

## 2023-08-30 RX ADMIN — CALCITRIOL CAPSULES 0.25 MCG 0.25 MCG: 0.25 CAPSULE ORAL at 08:15

## 2023-08-30 RX ADMIN — SEVELAMER CARBONATE 800 MG: 800 TABLET, FILM COATED ORAL at 08:15

## 2023-08-30 RX ADMIN — DOCUSATE SODIUM 100 MG: 100 CAPSULE, LIQUID FILLED ORAL at 20:54

## 2023-08-30 RX ADMIN — DOCUSATE SODIUM 100 MG: 100 CAPSULE, LIQUID FILLED ORAL at 08:14

## 2023-08-30 RX ADMIN — SEVELAMER CARBONATE 800 MG: 800 TABLET, FILM COATED ORAL at 16:40

## 2023-08-30 RX ADMIN — HEPARIN SODIUM 5000 UNITS: 5000 INJECTION INTRAVENOUS; SUBCUTANEOUS at 08:18

## 2023-08-30 RX ADMIN — HYDROCODONE BITARTRATE AND ACETAMINOPHEN 1 TABLET: 7.5; 325 TABLET ORAL at 16:42

## 2023-08-30 RX ADMIN — SODIUM CHLORIDE, PRESERVATIVE FREE 5 ML: 5 INJECTION INTRAVENOUS at 20:54

## 2023-08-30 RX ADMIN — AMLODIPINE BESYLATE 5 MG: 5 TABLET ORAL at 08:15

## 2023-08-30 RX ADMIN — BUPROPION HYDROCHLORIDE 300 MG: 150 TABLET, FILM COATED, EXTENDED RELEASE ORAL at 08:15

## 2023-08-30 RX ADMIN — CEPHALEXIN 250 MG: 250 CAPSULE ORAL at 19:21

## 2023-08-30 RX ADMIN — FOLIC ACID 500 MCG: 1 TABLET ORAL at 08:15

## 2023-08-30 RX ADMIN — ONDANSETRON 4 MG: 4 TABLET, ORALLY DISINTEGRATING ORAL at 09:40

## 2023-08-30 ASSESSMENT — PAIN DESCRIPTION - LOCATION
LOCATION: ABDOMEN;BACK
LOCATION: BACK
LOCATION: BACK

## 2023-08-30 ASSESSMENT — PAIN DESCRIPTION - DESCRIPTORS
DESCRIPTORS: ACHING
DESCRIPTORS: SORE;TENDER
DESCRIPTORS: SHARP

## 2023-08-30 ASSESSMENT — PAIN DESCRIPTION - ORIENTATION
ORIENTATION: RIGHT;LEFT
ORIENTATION: RIGHT;LEFT
ORIENTATION: LEFT

## 2023-08-30 ASSESSMENT — PAIN DESCRIPTION - PAIN TYPE
TYPE: SURGICAL PAIN

## 2023-08-30 ASSESSMENT — PAIN - FUNCTIONAL ASSESSMENT
PAIN_FUNCTIONAL_ASSESSMENT: ACTIVITIES ARE NOT PREVENTED

## 2023-08-30 ASSESSMENT — PAIN SCALES - GENERAL
PAINLEVEL_OUTOF10: 4
PAINLEVEL_OUTOF10: 5
PAINLEVEL_OUTOF10: 4
PAINLEVEL_OUTOF10: 8
PAINLEVEL_OUTOF10: 4
PAINLEVEL_OUTOF10: 4

## 2023-08-30 NOTE — PROGRESS NOTES
retained fecal material is seen throughout the colon. No bowel obstruction, free fluid, fluid collection, or free air is observed. The appendix appears surgically absent. A small sliding-type hiatal hernia is unchanged. Retroperitoneum / lymph nodes: The aorta is not dilated. No lymphadenopathy is visualized accounting for lack of IV contrast. Pelvis: The uterus is absent. No adnexal lesions are identified. Musculoskeletal: Diffuse osteopenia is present. The visualized skeletal structures appear intact. 1. Moderate left-sided hydronephrosis and left hydroureter is seen to the level of the mid left ureter. The left ureter beyond this point is not identified with certainty. An obstructing mid or distal left ureteral calculus may be present. Nonobstructing  calcific density layers within the left renal calyces and there are punctate nonobstructing calculi in the right kidney measuring 1 to 2 mm. Differential considerations include an ascending urinary tract infection. Correlate with urinalysis. 2. Suggestion of hypoattenuating lesions in the mid and lower pole of the right kidney which are incompletely characterized without IV contrast. 3. Moderate retained fecal material seen throughout the colon suggesting fecal stasis. No bowel obstruction. Chronic findings are discussed. **This report has been created using voice recognition software. It may contain minor errors which are inherent in voice recognition technology. ** Final report electronically signed by Dr Mildred Pelayo on 8/28/2023 3:47 PM      Impression:    Patient Active Problem List   Diagnosis    Cerebral infarction ([address])    Hx of Chest pain, atypical- tenderness on the left lower chest wall    Anxiety disorder    History of CVA (cerebrovascular accident)    Hyperlipidemia    Irritable bowel syndrome    Abdominal adhesions    Allergic rhinitis    Essential hypertension    Generalized anxiety disorder    Stage 4 chronic kidney disease ([address])    Age related osteoporosis    Major depression, recurrent ([address])    Environmental and seasonal allergies    Hearing loss of right ear due to cerumen impaction    Recurrent sinusitis    Abnormal EKG    Postural dizziness    SOB (shortness of breath) on exertion    Lumbosacral radiculopathy    Degeneration of lumbar intervertebral disc    Acute renal failure (HCC)    Metabolic acidosis    Hypokalemia    Hypomagnesemia    Hyperphosphatemia    Hypocalcemia    Hemoptysis    ERUM (acute kidney injury) ([address])    Nosebleed    Acute on chronic anemia    Hypernatremia    Abdominal pain    Nausea and vomiting    Upper respiratory tract infection    Chronic diastolic CHF (congestive heart failure) (HCC)    Depression    Generalized weakness    ATN (acute tubular necrosis) (HCC)    Thrombocytopenia (HCC)    Hypercalcemia    ARF (acute renal failure) with tubular necrosis (HCC)    Moderate malnutrition (HCC)    End stage renal disease (HCC)    Hyperkalemia    Physical deconditioning    Hydronephrosis    Ureterolithiasis       s/p CYSTOSCOPY, LEFT URETERAL STENT PLACEMENT on 8/28/2023 - 8/29/2023    CARLOS MANUEL Malik - CNP  12:48 PM 8/30/2023

## 2023-08-30 NOTE — PROGRESS NOTES
Hospitalist Progress Note    Patient: Freddie Amezquita    YOB: 1955  Unit/Bed:6A-16/016-A  Date of Admission: 8/28/2023    Assessment/Plan:    Left hydronephrosis, hydroureter:   CT showed moderate left sided hydronephrosis and left hydroureter to the level of the mid left ureter, not identified beyond this point. Obstructing mid or distal left ureteral calculus not ruled out. Urology consulted. S/p cystoscopy, left ureteral stent placement 8/29. Concern for possible urinary tract infection process. Empirically treating with Keflex while stent in place    Reported instability and deconditioning: Lengthy discussion with . He states that she is very unsteady on her feet and that he has to follow her wherever she walks around the house. He states that she is quite deconditioned and without him she is unsafe. PT/OT consulted for possible placement needs    ESRD on HD: Oliguric. Follows with Dr. Jermain Duncan, consulted. HAGMA: Nephrology consulted. Should be corrected with dialysis. Essential HTN: Resume home amlodipine, monitor. Chronic HFpEF: Echo 05/2023 EF 60%, G1DD. Volume primarily managed by dialysis. Hx ischemic CVA: On ASA, Plavix, statin. Residual mild left sided weakness. Resume ASA, Plavix       Chief Complaint: flank pain    HPI / Hospital Course: Freddie Amezquita is a 69 y.o. female with PMHx of ESRD, CVA, IBS, AMISH, anxiety who presented to [facility] with chief complaint of left flank pain. CT scan in ED showed moderate left sided hydronephrosis and left hydroureter to the level of the mid left ureter, not identified beyond this point. An obstructing mid or distal left ureteral calculus may be present. UA shows mod leukocyte esterase, blood and protein; no nitrites or bacteria seen. Pt was given IV ceftriaxone in ED. Pt admitted to Hospitalist service. Nephrology consulted for ESRD. Urology consulted, plan for cystoscopy with left ureteral stent placement today. appears surgically absent. A small sliding-type hiatal hernia is unchanged. Retroperitoneum / lymph nodes: The aorta is not dilated. No lymphadenopathy is visualized accounting for lack of IV contrast. Pelvis: The uterus is absent. No adnexal lesions are identified. Musculoskeletal: Diffuse osteopenia is present. The visualized skeletal structures appear intact. 1. Moderate left-sided hydronephrosis and left hydroureter is seen to the level of the mid left ureter. The left ureter beyond this point is not identified with certainty. An obstructing mid or distal left ureteral calculus may be present. Nonobstructing  calcific density layers within the left renal calyces and there are punctate nonobstructing calculi in the right kidney measuring 1 to 2 mm. Differential considerations include an ascending urinary tract infection. Correlate with urinalysis. 2. Suggestion of hypoattenuating lesions in the mid and lower pole of the right kidney which are incompletely characterized without IV contrast. 3. Moderate retained fecal material seen throughout the colon suggesting fecal stasis. No bowel obstruction. Chronic findings are discussed. **This report has been created using voice recognition software. It may contain minor errors which are inherent in voice recognition technology. ** Final report electronically signed by Dr Mildred Pelayo on 8/28/2023 3:47 PM          DVT prophylaxis: Heparin  Diet: ADULT DIET; Regular; Low Potassium (Less than 3000 mg/day);  Low Phosphorus (Less than 1000 mg)  PT/OT: No  Tele: No  IVF: No  Code Status: Full Code      Electronically signed by Gonzalez Espitia MD on 8/30/2023 at 5:23 PM

## 2023-08-30 NOTE — PROGRESS NOTES
[facility]--. [id number] [facility] PROGRESS NOTE      Patient: Alessandra Shoulder  Room #: 6A-16/016-A            YOB: 1955  Age: 69 y.o. Gender: female            Admit Date & Time: 8/28/2023  1:55 PM    The patient was out of the room for this visit. The patients spouse was seated in the guest chair and did not need a visit at this time.     Electronically signed by Kenya Haddad on 8/30/2023 at 2:15 PM.  [place]  [phone]     08/30/23 1414   Encounter Summary   Encounter Overview/Reason  Initial Encounter   Service Provided For: Family   [facility]   Support System Spouse   Last Encounter  08/30/23   Complexity of Encounter Low   Begin Time 1400   End Time  1405   Total Time Calculated 5 min   Assessment/Intervention/Outcome   Assessment Coping   Intervention Sustaining Presence/Ministry of presence   Outcome Acceptance

## 2023-08-30 NOTE — PLAN OF CARE
Problem: Discharge Planning  Goal: Discharge to home or other facility with appropriate resources  8/29/2023 2259 by Scott Lynn RN  Outcome: Progressing  Flowsheets (Taken 8/29/2023 1938)  Discharge to home or other facility with appropriate resources:   Identify barriers to discharge with patient and caregiver   Arrange for needed discharge resources and transportation as appropriate   Identify discharge learning needs (meds, wound care, etc)   Refer to discharge planning if patient needs post-hospital services based on physician order or complex needs related to functional status, cognitive ability or social support system  8/29/2023 1732 by Nohelia Garcia RN  Outcome: Progressing  Flowsheets (Taken 8/28/2023 2000 by Scott Lynn, RN)  Discharge to home or other facility with appropriate resources:   Identify barriers to discharge with patient and caregiver   Arrange for needed discharge resources and transportation as appropriate   Identify discharge learning needs (meds, wound care, etc)   Refer to discharge planning if patient needs post-hospital services based on physician order or complex needs related to functional status, cognitive ability or social support system     Problem: Pain  Goal: Verbalizes/displays adequate comfort level or baseline comfort level  8/29/2023 2259 by Scott Lynn RN  Outcome: Progressing  Flowsheets (Taken 8/29/2023 0146)  Verbalizes/displays adequate comfort level or baseline comfort level:   Encourage patient to monitor pain and request assistance   Assess pain using appropriate pain scale   Administer analgesics based on type and severity of pain and evaluate response   Implement non-pharmacological measures as appropriate and evaluate response   Notify Licensed Independent Practitioner if interventions unsuccessful or patient reports new pain  8/29/2023 1732 by Nohelia Garcia RN  Outcome: Progressing  [address] (Taken 8/29/2023 0146 by Scott Lynn

## 2023-08-30 NOTE — CARE COORDINATION
8/30/23, 3:50 PM EDT    [facility] day: 2  Location: -16/016-A Reason for admit: Hydronephrosis [N13.30]  Ureterolithiasis [N20.1]   Procedure:   8/28 CT Abd/pelvis:   1. Moderate left-sided hydronephrosis and left hydroureter is seen to the level of the mid left ureter. The left ureter beyond this point is not identified with certainty. An obstructing mid or distal left ureteral calculus may be present. Nonobstructing calcific density layers within the left renal calyces and there are punctate nonobstructing calculi in the right kidney measuring 1 to 2 mm. Differential considerations include an ascending urinary tract infection. Correlate with urinalysis. 2. Suggestion of hypoattenuating lesions in the mid and lower pole of the right kidney which are incompletely characterized without IV contrast.  3. Moderate retained fecal material seen throughout the colon suggesting fecal stasis. No bowel obstruction. Chronic findings are discussed  8/29 OR with Dr Namrata Eli: Cystoscopy with left ureteral stent placement. Barriers to Discharge: Hospitalist, Nephrology and Urology following. POD #1. To dialysis today. PT/OT consulted this am. BUN 49, creatinine 5.5. Pain and nausea control. Room air. PCP: Munir Cade MD  Readmission Risk Score: 20.7%  Patient Goals/Plan/Treatment Preferences: From home with . Current with [address] [place] [address] at 1500, on MWF for HD. SW following for possible placement.

## 2023-08-30 NOTE — TELEPHONE ENCOUNTER
Left stent placed by Dave Cornejo per his op note \"Needs ureteroscopy poss biopsy (15)\"  Scheduled in the upcoming wks

## 2023-08-30 NOTE — CARE COORDINATION
DISCHARGE PLANNING EVALUATION  8/30/23, 2:50 PM EDT    Reason for Referral: possible SNF depending on therapy recommendation. Mental Status: alert and oriented pta. Decision Making: makes own decisions. Family/Social/Home Environment:  Assessment completed with pts  while pt was in Dialysis.  states he would like pt evaluated by Therapy to see if pt is safe to return home.  states pt was inactive prior to this admission, pt sits on the couch or lays in bed most of the day.  does all household chores and drives pt to dialysis.  states pt uses a walker around the house and a transport chair to dialysis.  does all grocery shopping. Current Services including food security, transportation and housekeeping: see note above. Current Equipment:walker, transport chair, grab bars around the toilet and shower and has ERS. `  Payment Source: Medicare and [employer] Medicare  Concerns or Barriers to Discharge: None reported. Post-acute [facility]) provider list was provided to patient. Patient was informed of their freedom to choose [facility] provider. Discussed and offered to show the patient the relevant [facility] Providers quality and resource use measures on Medicare Compare web site via computer based on patient's goals of care and treatment preferences. Questions regarding selection process were answered. Teach Back Method used with  regarding care plan and discharge planning.  verbalized understanding of the plan of care and contribute to goal setting. Patient goals, treatment preferences and discharge plan:  considering [facility] if Therapy recommends SNF and if pt agrees. Therapy consult placed by nurse. SW spoke with Sofie Lacy at [place] for potential admission.  states he will transport to dialysis from nursing home.     Electronically signed by Chipper Libman, LSW on 8/30/2023 at 2:50 PM

## 2023-08-31 LAB
ANION GAP SERPL CALC-SCNC: 12 MEQ/L (ref 8–16)
BUN SERPL-MCNC: 22 MG/DL (ref 7–22)
CALCIUM SERPL-MCNC: 9.8 MG/DL (ref 8.5–10.5)
CHLORIDE SERPL-SCNC: 103 MEQ/L (ref 98–111)
CO2 SERPL-SCNC: 21 MEQ/L (ref 23–33)
CREAT SERPL-MCNC: 3.4 MG/DL (ref 0.4–1.2)
GFR SERPL CREATININE-BSD FRML MDRD: 14 ML/MIN/1.73M2
GLUCOSE SERPL-MCNC: 124 MG/DL (ref 70–108)
POTASSIUM SERPL-SCNC: 4.4 MEQ/L (ref 3.5–5.2)
SODIUM SERPL-SCNC: 136 MEQ/L (ref 135–145)

## 2023-08-31 PROCEDURE — 80048 BASIC METABOLIC PNL TOTAL CA: CPT

## 2023-08-31 PROCEDURE — 97162 PT EVAL MOD COMPLEX 30 MIN: CPT

## 2023-08-31 PROCEDURE — 97535 SELF CARE MNGMENT TRAINING: CPT

## 2023-08-31 PROCEDURE — 97116 GAIT TRAINING THERAPY: CPT

## 2023-08-31 PROCEDURE — 6360000002 HC RX W HCPCS: Performed by: PHYSICIAN ASSISTANT

## 2023-08-31 PROCEDURE — 6370000000 HC RX 637 (ALT 250 FOR IP): Performed by: PHYSICIAN ASSISTANT

## 2023-08-31 PROCEDURE — 36415 COLL VENOUS BLD VENIPUNCTURE: CPT

## 2023-08-31 PROCEDURE — 6370000000 HC RX 637 (ALT 250 FOR IP): Performed by: INTERNAL MEDICINE

## 2023-08-31 PROCEDURE — 2580000003 HC RX 258: Performed by: PHYSICIAN ASSISTANT

## 2023-08-31 PROCEDURE — 97110 THERAPEUTIC EXERCISES: CPT

## 2023-08-31 PROCEDURE — 97166 OT EVAL MOD COMPLEX 45 MIN: CPT

## 2023-08-31 PROCEDURE — 1200000000 HC SEMI PRIVATE

## 2023-08-31 RX ADMIN — HEPARIN SODIUM 5000 UNITS: 5000 INJECTION INTRAVENOUS; SUBCUTANEOUS at 20:17

## 2023-08-31 RX ADMIN — ATORVASTATIN CALCIUM 10 MG: 10 TABLET, FILM COATED ORAL at 07:56

## 2023-08-31 RX ADMIN — SEVELAMER CARBONATE 800 MG: 800 TABLET, FILM COATED ORAL at 07:57

## 2023-08-31 RX ADMIN — CALCITRIOL CAPSULES 0.25 MCG 0.25 MCG: 0.25 CAPSULE ORAL at 07:56

## 2023-08-31 RX ADMIN — HYDROCODONE BITARTRATE AND ACETAMINOPHEN 1 TABLET: 7.5; 325 TABLET ORAL at 08:06

## 2023-08-31 RX ADMIN — BUPROPION HYDROCHLORIDE 150 MG: 150 TABLET, FILM COATED, EXTENDED RELEASE ORAL at 07:57

## 2023-08-31 RX ADMIN — ONDANSETRON 4 MG: 4 TABLET, ORALLY DISINTEGRATING ORAL at 02:05

## 2023-08-31 RX ADMIN — FOLIC ACID 500 MCG: 1 TABLET ORAL at 07:56

## 2023-08-31 RX ADMIN — CALCIUM POLYCARBOPHIL 625 MG: 625 TABLET, FILM COATED ORAL at 07:58

## 2023-08-31 RX ADMIN — ASPIRIN 81 MG 81 MG: 81 TABLET ORAL at 07:56

## 2023-08-31 RX ADMIN — CEPHALEXIN 250 MG: 250 CAPSULE ORAL at 07:56

## 2023-08-31 RX ADMIN — DOCUSATE SODIUM 100 MG: 100 CAPSULE, LIQUID FILLED ORAL at 07:57

## 2023-08-31 RX ADMIN — AMLODIPINE BESYLATE 5 MG: 5 TABLET ORAL at 07:56

## 2023-08-31 RX ADMIN — CLOPIDOGREL BISULFATE 75 MG: 75 TABLET ORAL at 07:57

## 2023-08-31 RX ADMIN — SODIUM CHLORIDE, PRESERVATIVE FREE 10 ML: 5 INJECTION INTRAVENOUS at 07:57

## 2023-08-31 RX ADMIN — MONTELUKAST SODIUM 10 MG: 10 TABLET ORAL at 20:17

## 2023-08-31 RX ADMIN — SODIUM CHLORIDE, PRESERVATIVE FREE 10 ML: 5 INJECTION INTRAVENOUS at 20:17

## 2023-08-31 RX ADMIN — CEPHALEXIN 250 MG: 250 CAPSULE ORAL at 20:17

## 2023-08-31 RX ADMIN — DOCUSATE SODIUM 100 MG: 100 CAPSULE, LIQUID FILLED ORAL at 20:17

## 2023-08-31 RX ADMIN — SEVELAMER CARBONATE 800 MG: 800 TABLET, FILM COATED ORAL at 17:09

## 2023-08-31 RX ADMIN — SEVELAMER CARBONATE 800 MG: 800 TABLET, FILM COATED ORAL at 11:40

## 2023-08-31 RX ADMIN — BUPROPION HYDROCHLORIDE 300 MG: 150 TABLET, FILM COATED, EXTENDED RELEASE ORAL at 07:56

## 2023-08-31 RX ADMIN — HEPARIN SODIUM 5000 UNITS: 5000 INJECTION INTRAVENOUS; SUBCUTANEOUS at 07:57

## 2023-08-31 ASSESSMENT — PAIN DESCRIPTION - ONSET: ONSET: ON-GOING

## 2023-08-31 ASSESSMENT — PAIN DESCRIPTION - PAIN TYPE: TYPE: ACUTE PAIN

## 2023-08-31 ASSESSMENT — PAIN SCALES - GENERAL
PAINLEVEL_OUTOF10: 3
PAINLEVEL_OUTOF10: 4
PAINLEVEL_OUTOF10: 8

## 2023-08-31 ASSESSMENT — PAIN DESCRIPTION - DESCRIPTORS: DESCRIPTORS: CRAMPING

## 2023-08-31 ASSESSMENT — PAIN DESCRIPTION - LOCATION: LOCATION: ABDOMEN

## 2023-08-31 ASSESSMENT — PAIN DESCRIPTION - ORIENTATION: ORIENTATION: MID

## 2023-08-31 ASSESSMENT — PAIN DESCRIPTION - FREQUENCY: FREQUENCY: CONTINUOUS

## 2023-08-31 ASSESSMENT — PAIN - FUNCTIONAL ASSESSMENT: PAIN_FUNCTIONAL_ASSESSMENT: ACTIVITIES ARE NOT PREVENTED

## 2023-08-31 NOTE — PROGRESS NOTES
[facility] OCCUPATIONAL THERAPY  STRZ 6A PEDI/MED SURG  EVALUATION    Time:    Time In: 9863  Time Out: 4732  Timed Code Treatment Minutes: 24 Minutes  Minutes: 34          Date: 2023  Patient Name: Natalia Ng,   Gender: female      MRN: [id number]  : 1955  (69 y.o.)  Referring Practitioner: Dimple Gould MD  Diagnosis: Hydronephrosis  Additional Pertinent Hx: Per ER note on 2023:67 y.o. female with PMH of renal failure, HD MWF, depression, HLD, anxiety, CVA presents to the emergency department for evaluation of abdominal and back pain. Patient described the pain as starting 3 days ago after she dialyzed on Friday. Pain is constant and circumferential around her abdomen and back. Patient did endorse diarrhea on Saturday but has since returned to normal BMs. s/p CYSTOSCOPY, LEFT URETERAL STENT PLACEMENT on 2023    Restrictions/Precautions:  Restrictions/Precautions: Fall Risk    Subjective  Chart Reviewed: Yes, Orders, Progress Notes, History and Physical  Patient assessed for rehabilitation services?: Yes  Family / Caregiver Present: Yes ()    Subjective: cooperative, noted to have short attention span & poor recall. Anxiety & tremor also observed.     Pain: no number given/10: reports \"cramping\" pain    Vitals: Vitals not assessed per clinical judgement, see nursing flowsheet    Social/Functional History:  Lives With: Spouse  Type of Home: House  Home Layout: One level  Home Access: Stairs to enter without rails (1)  Home Equipment: [employer], 4 wheeled   Bathroom Shower/Tub: Tub/Shower unit (has walk in in another bathroom)  Bathroom Toilet: Standard  Bathroom Equipment: Shower chair       ADL Assistance: Needs assistance (A for BADL (S & for back))  Homemaking Assistance: Needs assistance ( completes)  Ambulation Assistance: Independent  Transfer Assistance: Independent    Active : No  Patient's  Info:      Additional Decreased ADL status, Decreased functional mobility , Decreased safe awareness  Prognosis: Fair  REQUIRES OT FOLLOW-UP: Yes  Decision Making: Medium Complexity    Treatment Initiated: Treatment and education initiated within context of evaluation. Evaluation time included review of current medical information, gathering information related to past medical, social and functional history, completion of standardized testing, formal and informal observation of tasks, assessment of data and development of plan of care and goals. Treatment time included skilled education and facilitation of tasks to increase safety and independence with ADL's for improved functional independence and quality of life. Discharge Recommendations:  Subacute/Skilled Nursing Facility    Patient Education:     Patient Education  Education Given To: Patient, Caregiver  Education Provided: Role of Therapy, Plan of Care, Transfer Training, ADL Adaptive Strategies  Education Method: Demonstration, Verbal  Barriers to Learning: Cognition  Education Outcome: Continued education needed    Equipment Recommendations: Other: Monitor pending progress    Plan:  Times Per Week: 5x  Current Treatment Recommendations: Functional mobility training, Balance training, Endurance training, Safety education & training, Self-Care / ADL, Strengthening. See long-term goal time frame for expected duration of plan of care. If no long-term goals established, a short length of stay is anticipated.     Goals:  Patient goals : go home  Short Term Goals  Time Frame for Short Term Goals: until discharge  Short Term Goal 1: Pt will complete various t/fs including toilet with S & 0-2 vcs for UE placement & safety  Short Term Goal 2: Pt will tolerate standing 2-3 min with S for increased ease of sinkside grooming  Short Term Goal 3: Pt will complete mobility to/from bathroom + with ADL item retrieval with RW, S, & 0-2 vcs for safety  Short Term Goal 4: Pt will complete BADL

## 2023-08-31 NOTE — CARE COORDINATION
8/31/23, 11:38 AM EDT    DISCHARGE PLANNING EVALUATION   Patient and  are requesting [address] [place] as first choice. Call made to [facility], referral initiated.

## 2023-08-31 NOTE — CARE COORDINATION
8/31/23, 7:49 AM EDT    DISCHARGE ON [address] day: 3  Location: 6A-16/016-A Reason for admit: Hydronephrosis [N13.30]  Ureterolithiasis [N20.1]   Procedure:   8/28 CT Abd/pelvis:   1. Moderate left-sided hydronephrosis and left hydroureter is seen to the level of the mid left ureter. The left ureter beyond this point is not identified with certainty. An obstructing mid or distal left ureteral calculus may be present. Nonobstructing calcific density layers within the left renal calyces and there are punctate nonobstructing calculi in the right kidney measuring 1 to 2 mm. Differential considerations include an ascending urinary tract infection. Correlate with urinalysis. 2. Suggestion of hypoattenuating lesions in the mid and lower pole of the right kidney which are incompletely characterized without IV contrast.  3. Moderate retained fecal material seen throughout the colon suggesting fecal stasis. No bowel obstruction. Chronic findings are discussed  8/29 OR with Dr Shabbir Brandon: Cystoscopy with left ureteral stent placement. Barriers to Discharge: Hospitalist, Nephrology and Urology following. POD #2. PT/OT to eval. Keflex po q12hr. Heparin subq bid. Pain and nausea control. PCP: Chyna Gonzales MD  Readmission Risk Score: 20.7%  Patient Goals/Plan/Treatment Preferences: From home with . Current with [address] [place] [address] at 1500, on MWF for HD. SW following for possible placement. Awaiting therapy recommendations.

## 2023-08-31 NOTE — PROGRESS NOTES
[facility]  INPATIENT PHYSICAL THERAPY  EVALUATION  [facility] 6A PEDI/MED SURG - 6A-16/016-A    Time In: 4828  Time Out: 1120  Timed Code Treatment Minutes: 12 Minutes  Minutes: 21          Date: 2023  Patient Name: Candy Marvin,  Gender:  female        MRN: [id number]  : 1955  (69 y.o.)      Referring Practitioner: Mike Mojica MD  Diagnosis: Hydronephrosis  Additional Pertinent Hx: 69 y.o. female with PMHx of ESRD, CVA, IBS, AMISH, anxiety who presented to [facility] with chief complaint of left flank pain. Patient reports abdominal pain starting a few days ago. Pain is constant, no alleviating/aggravating factors. CT scan in ED showed moderate left sided hydronephrosis and left hydroureter to the level of the mid left ureter, not identified beyond this point. An obstructing mid or distal left ureteral calculus may be present. CYSTOSCOPY, LEFT URETERAL STENT PLACEMENT    by Dr Georges Rank     Restrictions/Precautions:  Restrictions/Precautions: Fall Risk    Subjective:  Chart Reviewed: Yes  Patient assessed for rehabilitation services?: Yes  Subjective: RN approved session.  Pt pleasant and agreeable to  therapy    General:  Overall Orientation Status: Within Functional Limits  Vision: Within Functional Limits  Hearing: Within functional limits       Pain: 3-4 at rest/ 8-9 with ambulation/10: stomach    Vitals: Vitals not assessed per clinical judgement, see nursing flowsheet    Social/Functional History:    Lives With: Spouse  Type of Home: House  Home Layout: One level  Home Access: Stairs to enter without rails  Entrance Stairs - Number of Steps: 1  Home Equipment: Paddy [employer], 4 wheeled, [employer]     Bathroom Shower/Tub: Tub/Shower unit (has walk in in another bathroom)  Bathroom Toilet: Standard  Bathroom Equipment: Shower chair       ADL Assistance: Needs assistance (A for BADL (S & for back))  Homemaking Assistance: Needs assistance ( completes)  Ambulation Assistance:

## 2023-09-01 VITALS
WEIGHT: 88.4 LBS | DIASTOLIC BLOOD PRESSURE: 79 MMHG | RESPIRATION RATE: 18 BRPM | SYSTOLIC BLOOD PRESSURE: 101 MMHG | TEMPERATURE: 98.4 F | BODY MASS INDEX: 17.36 KG/M2 | HEART RATE: 95 BPM | HEIGHT: 60 IN | OXYGEN SATURATION: 92 %

## 2023-09-01 LAB
ANION GAP SERPL CALC-SCNC: 17 MEQ/L (ref 8–16)
BUN SERPL-MCNC: 40 MG/DL (ref 7–22)
CALCIUM SERPL-MCNC: 10.2 MG/DL (ref 8.5–10.5)
CHLORIDE SERPL-SCNC: 109 MEQ/L (ref 98–111)
CO2 SERPL-SCNC: 18 MEQ/L (ref 23–33)
CREAT SERPL-MCNC: 5.9 MG/DL (ref 0.4–1.2)
DEPRECATED RDW RBC AUTO: 47.5 FL (ref 35–45)
ERYTHROCYTE [DISTWIDTH] IN BLOOD BY AUTOMATED COUNT: 13.2 % (ref 11.5–14.5)
GFR SERPL CREATININE-BSD FRML MDRD: 7 ML/MIN/1.73M2
GLUCOSE SERPL-MCNC: 95 MG/DL (ref 70–108)
HCT VFR BLD AUTO: 34.8 % (ref 37–47)
HGB BLD-MCNC: 10.5 GM/DL (ref 12–16)
MCH RBC QN AUTO: 29.9 PG (ref 26–33)
MCHC RBC AUTO-ENTMCNC: 30.2 GM/DL (ref 32.2–35.5)
MCV RBC AUTO: 99.1 FL (ref 81–99)
PLATELET # BLD AUTO: 195 THOU/MM3 (ref 130–400)
PMV BLD AUTO: 10.7 FL (ref 9.4–12.4)
POTASSIUM SERPL-SCNC: 4.4 MEQ/L (ref 3.5–5.2)
RBC # BLD AUTO: 3.51 MILL/MM3 (ref 4.2–5.4)
SODIUM SERPL-SCNC: 144 MEQ/L (ref 135–145)
WBC # BLD AUTO: 5.8 THOU/MM3 (ref 4.8–10.8)

## 2023-09-01 PROCEDURE — 6360000002 HC RX W HCPCS: Performed by: PHYSICIAN ASSISTANT

## 2023-09-01 PROCEDURE — 85027 COMPLETE CBC AUTOMATED: CPT

## 2023-09-01 PROCEDURE — 90935 HEMODIALYSIS ONE EVALUATION: CPT | Performed by: INTERNAL MEDICINE

## 2023-09-01 PROCEDURE — 90935 HEMODIALYSIS ONE EVALUATION: CPT

## 2023-09-01 PROCEDURE — 80048 BASIC METABOLIC PNL TOTAL CA: CPT

## 2023-09-01 PROCEDURE — 2580000003 HC RX 258: Performed by: INTERNAL MEDICINE

## 2023-09-01 PROCEDURE — 36415 COLL VENOUS BLD VENIPUNCTURE: CPT

## 2023-09-01 PROCEDURE — 6360000002 HC RX W HCPCS: Performed by: INTERNAL MEDICINE

## 2023-09-01 PROCEDURE — 6370000000 HC RX 637 (ALT 250 FOR IP): Performed by: INTERNAL MEDICINE

## 2023-09-01 PROCEDURE — 6370000000 HC RX 637 (ALT 250 FOR IP): Performed by: PHYSICIAN ASSISTANT

## 2023-09-01 RX ORDER — CEPHALEXIN 250 MG/1
250 CAPSULE ORAL EVERY 12 HOURS SCHEDULED
Qty: 12 CAPSULE | Refills: 0 | DISCHARGE
Start: 2023-09-01 | End: 2023-09-07

## 2023-09-01 RX ORDER — WATER 1000 ML/1000ML
4 INJECTION, SOLUTION INTRAVENOUS
Status: COMPLETED | OUTPATIENT
Start: 2023-09-01 | End: 2023-09-01

## 2023-09-01 RX ADMIN — WATER 4 ML: 1 INJECTION INTRAMUSCULAR; INTRAVENOUS; SUBCUTANEOUS at 10:33

## 2023-09-01 RX ADMIN — FOLIC ACID 500 MCG: 1 TABLET ORAL at 14:14

## 2023-09-01 RX ADMIN — BUPROPION HYDROCHLORIDE 300 MG: 150 TABLET, FILM COATED, EXTENDED RELEASE ORAL at 14:11

## 2023-09-01 RX ADMIN — CEPHALEXIN 250 MG: 250 CAPSULE ORAL at 14:14

## 2023-09-01 RX ADMIN — SEVELAMER CARBONATE 800 MG: 800 TABLET, FILM COATED ORAL at 14:11

## 2023-09-01 RX ADMIN — DOCUSATE SODIUM 100 MG: 100 CAPSULE, LIQUID FILLED ORAL at 14:09

## 2023-09-01 RX ADMIN — ALTEPLASE 2 MG: 2.2 INJECTION, POWDER, LYOPHILIZED, FOR SOLUTION INTRAVENOUS at 10:33

## 2023-09-01 RX ADMIN — HEPARIN SODIUM 5000 UNITS: 5000 INJECTION INTRAVENOUS; SUBCUTANEOUS at 14:10

## 2023-09-01 RX ADMIN — BUPROPION HYDROCHLORIDE 150 MG: 150 TABLET, FILM COATED, EXTENDED RELEASE ORAL at 14:12

## 2023-09-01 RX ADMIN — CALCITRIOL CAPSULES 0.25 MCG 0.25 MCG: 0.25 CAPSULE ORAL at 14:13

## 2023-09-01 RX ADMIN — TRAZODONE HYDROCHLORIDE 50 MG: 50 TABLET ORAL at 01:06

## 2023-09-01 RX ADMIN — CALCIUM POLYCARBOPHIL 625 MG: 625 TABLET, FILM COATED ORAL at 14:12

## 2023-09-01 RX ADMIN — ASPIRIN 81 MG 81 MG: 81 TABLET ORAL at 14:09

## 2023-09-01 RX ADMIN — ATORVASTATIN CALCIUM 10 MG: 10 TABLET, FILM COATED ORAL at 14:13

## 2023-09-01 RX ADMIN — CLOPIDOGREL BISULFATE 75 MG: 75 TABLET ORAL at 14:10

## 2023-09-01 NOTE — CARE COORDINATION
9/1/23, 8:55 AM EDT    DISCHARGE PLANNING EVALUATION    Call made to [phone], spoke with admissions, facility is ready to accept today. Spoke with patient, confirmed plan for [facility]. Spoke with patient's , confirmed plan for [facility]. Updated RN. Transfer packet is on chart. 9/1/23, 3:09 PM EDT    Patient goals/plan/ treatment preferences discussed by  and . Patient goals/plan/ treatment preferences reviewed with patient/ family. Patient/ family verbalize understanding of discharge plan and are in agreement with goal/plan/treatment preferences. Understanding was demonstrated using the teach back method. AVS provided by RN at time of discharge, which includes all necessary medical information pertaining to the patients current course of illness, treatment, post-discharge goals of care, and treatment preferences.      Services At/After Discharge: [facility] (SNF)       IMM Letter  IMM Letter given to Patient/Family/Significant other/Guardian/POA/by[de-identified] Vera Cooper RN   IMM Letter date given[de-identified] 09/01/23  IMM Letter time given[de-identified] 7069

## 2023-09-01 NOTE — PROGRESS NOTES
PROGRESS NOTE      Patient: Freddie Amezquita  Unit/Bed:6A-16/016-A  YOB: 1955  MRN: [id number]   Acct: [de-identified]    PCP: Teena Perrin MD    Date of Admission: 8/28/2023 LOS: 3    Date of Evaluation:  8/31/2023    Anticipated Discharge: Pending placement     Assessment/Plan:    Left hydronephrosis, hydroureter  CTAP without contrast on 8/28 showed moderate left-sided hydronephrosis and left hydroureter  Urology consulted, appreciate recommendations  S/P cystoscopy and left ureteral stent placement on 8/29  Need uteroscopy as outpatient    Urinary tract infection  UA on 8/28 showed moderate leukocyte Estrace, moderate blood, protein 30  Urine culture showed growth of contaminants  Received 1 dose of ceftriaxone on 8/28  Patient was started on Keflex 2050 mg oral twice daily on 8/30 for 7 days    Deconditioning and unsteady gait  PT/OT consulted, appreciate recommendations for skilled nursing facility  Planning for [facility] placement    ESRD on HD MWF  Patient previously followed with Dr. Olena Pillai, consulted  Continue calcitriol 0.25 mix daily, sevelamer 800 mg 3 times daily with meals  Creatinine on admission 7.1, with elevated baseline around 3-4  8/31 creatinine of 3.4    Anion gap metabolic acidosis  On admission anion gap of 20, (, chloride 104, bicarb 18)  8/31 and anion gap of 12 (sodium of 136, chloride of 103, bicarb of 21)    Essential hypertension  Continue home amlodipine    Chronic HFpEF  ECHO May 2023 showed EF of 60%    History of ischemic CVA with residual mild left-sided weakness  On aspirin, Plavix, statin    Anxiety and depression  Continue wellbutrin 450 mg extended release           Chief Complaint: Abdominal and back pain    Hospital Course:  Per initial H&P  \"Deloris Rodriguez is a 69 y.o. female with PMHx of ESRD, CVA, IBS, AMISH, anxiety who presented to [facility] with chief complaint of left flank pain.  CT scan in ED showed moderate left sided hydronephrosis and

## 2023-09-01 NOTE — OP NOTE
[address] [facility]    DATE: 8/29/2023  Patient: Andressa Calderon  MRN: [id number]  YOB: 1955    SURGEON: Alfredo James MD.    ASSISTANT: none    PREOPERATIVE DIAGNOSIS:  left ureteral obstruction    POSTOPERATIVE DIAGNOSIS: left ureteral obstruction    PROCEDURE PERFORMED:  Cystoscopy, left ureteral stent placement    ANESTHESIA: General    COMPLICATIONS: none    OR BLOOD LOSS:  Minimal    FLUIDS: Cystalloids per Anesthesia    SPECIMENS:  * No specimens in log *  none    DRAINS: 6 x 26 double j stent    INDICATIONS FOR PROCEDURE:  The patient is a 69 y.o. female who presents today with Abdominal pain, unspecified abdominal location [R10.9] here for CYSTOSCOPY, LEFT URETERAL STENT PLACEMENT. After risks, benefits and alternatives of the procedure were discussed with the patient, the patient elected to proceed. DETAILS OF PROCEDURE:  After informed consent was obtained in the preoperative area, the patient was taken back to the operating room and transferred to the operating table in supine position. Anesthesia was induced and antibiotics were given. The patient was placed in modified dorsal lithotomy position and sterilely prepped and draped in a standard fashion. A timeout occurred. Two patient identifiers were used. We entered the urethra with a 22 [place] scope. The Left ureteral orifice was then visualized. . A guidewire was carefully advanced into the kidney and position was confirmed with xray. Under direct visualization the stent was advanced over the wire until it was in proper location. The Glidewire was then removed. A curl could be seen in the Left renal pelvis under using fluoroscopic vision, and in the bladder under direct visualization. there was efflux of urine through the stent noted. The patients bladder was drained. All instrumentation was removed.  The patient was then awakened and discharged back to the PACU in good and stable condition.

## 2023-09-01 NOTE — CARE COORDINATION
9/1/23, 7:25 AM EDT    DISCHARGE ON [address] day: 4  Location: 6A-16/016-A Reason for admit: Hydronephrosis [N13.30]  Ureterolithiasis [N20.1]   Procedure:   8/28 CT Abd/pelvis:   1. Moderate left-sided hydronephrosis and left hydroureter is seen to the level of the mid left ureter. The left ureter beyond this point is not identified with certainty. An obstructing mid or distal left ureteral calculus may be present. Nonobstructing calcific density layers within the left renal calyces and there are punctate nonobstructing calculi in the right kidney measuring 1 to 2 mm. Differential considerations include an ascending urinary tract infection. Correlate with urinalysis. 2. Suggestion of hypoattenuating lesions in the mid and lower pole of the right kidney which are incompletely characterized without IV contrast.  3. Moderate retained fecal material seen throughout the colon suggesting fecal stasis. No bowel obstruction. Chronic findings are discussed  8/29 OR with Dr Mc Lee: Cystoscopy with left ureteral stent placement. Barriers to Discharge: Hospitalist, Nephrology and Urology following. POD #3. PT/OT. Keflex po. Heparin subq bid. Awaiting SNF acceptance. PCP: Manuela Raymundo MD  Readmission Risk Score: 19.5%  Patient Goals/Plan/Treatment Preferences: From home with . SW following for placement. Not a precert. Current with [address] MWF at 1500 for HD.

## 2023-09-01 NOTE — PROGRESS NOTES
[facility]  OCCUPATIONAL THERAPY MISSED TREATMENT NOTE  STRZ 6A PEDI/MED SURG  6A-16/016-A      Date: 2023  Patient Name: Amina Jeong        CSN: [id number]   : 1955  (69 y.o.)  Gender: female   Referring Practitioner: Nguyễn Fraire MD  Diagnosis: Hydronephrosis         REASON FOR MISSED TREATMENT: attempt x 1 patient off floor for dialysis.   Attempt x 2 patient is to be discharged soon

## 2023-09-01 NOTE — PLAN OF CARE
Problem: Discharge Planning  Goal: Discharge to home or other facility with appropriate resources  9/1/2023 0025 by Sondra Burns RN  Outcome: Progressing  Flowsheets (Taken 9/1/2023 0025)  Discharge to home or other facility with appropriate resources:   Identify barriers to discharge with patient and caregiver   Identify discharge learning needs (meds, wound care, etc)   Refer to discharge planning if patient needs post-hospital services based on physician order or complex needs related to functional status, cognitive ability or social support system   Arrange for needed discharge resources and transportation as appropriate     Problem: Pain  Goal: Verbalizes/displays adequate comfort level or baseline comfort level  9/1/2023 0025 by Sondra Burns RN  Outcome: Progressing  Flowsheets (Taken 9/1/2023 0025)  Verbalizes/displays adequate comfort level or baseline comfort level:   Encourage patient to monitor pain and request assistance   Assess pain using appropriate pain scale   Administer analgesics based on type and severity of pain and evaluate response   Implement non-pharmacological measures as appropriate and evaluate response     Problem: Safety - Adult  Goal: Free from fall injury  9/1/2023 0025 by Sondra Burns RN  Outcome: Progressing  Flowsheets (Taken 9/1/2023 0025)  Free From Fall Injury: Instruct family/caregiver on patient safety     Problem: Chronic Conditions and Co-morbidities  Goal: Patient's chronic conditions and co-morbidity symptoms are monitored and maintained or improved  9/1/2023 0025 by Sondra Burns RN  Outcome: Progressing  Flowsheets (Taken 9/1/2023 0025)  Care Plan - Patient's Chronic Conditions and Co-Morbidity Symptoms are Monitored and Maintained or Improved:   Monitor and assess patient's chronic conditions and comorbid symptoms for stability, deterioration, or improvement   Collaborate with multidisciplinary team to address chronic and comorbid conditions and prevent

## 2023-09-01 NOTE — DISCHARGE SUMMARY
DISCHARGE SUMMARY      Patient Identification:   Edi Chin   : 1955  MRN: [id number]   Account: [de-identified]      Patient's PCP: Cass Castrejon MD    Admit Date: 2023     Discharge Date:   23    Admitting Physician: No admitting provider for patient encounter. Discharge Physician: Whit Cordero MD     Discharge Diagnoses:    Left hydronephrosis, hydroureter  Urinary tract infection   Deconditioning and unsteady gait  ESRD on HD MWF  Anion gap metabolic acidosis  Essential hypertension  Chronic HFpEF  History of ischemic CVA with residual mild left-sided weakness  Anxiety and depression      Hospital Course: Edi Chin is a 69 y.o. female with PMHx of ESRD, CVA, IBS, AMISH, anxiety who presented to [facility] with chief complaint of left flank pain. CT scan in ED showed moderate left sided hydronephrosis and left hydroureter to the level of the mid left ureter, not identified beyond this point. An obstructing mid or distal left ureteral calculus may be present. UA shows mod leukocyte esterase, blood and protein; no nitrites or bacteria seen. Pt was given IV ceftriaxone in ED. Pt admitted to Hospitalist service. Nephrology consulted for ESRD. Urology consulted, plan for cystoscopy with left ureteral stent placement today. \"     : Patient to be evaluated by OT and PT for recommendations on rehab needs. 23: Patient had dialysis and was stable for discharge    The patient was seen and examined on day of discharge. Patient stable and optimized for discharge with Close follow up with PCP, Urology, Dr. Arlene Unger, and Dr. Candy Carnes. Ordered keflex 250mg twice daily for 6 days  Medications. Explained all instructions, and patient endorsed understanding, all questions answered.        Left hydronephrosis, hydroureter  CTAP without contrast on  showed moderate left-sided hydronephrosis and left hydroureter  Urology consulted, appreciate recommendations  S/P cystoscopy and left ureteral Suggestion of hypoattenuating lesions in the mid and lower pole of the right kidney which are incompletely characterized without IV contrast.      3. Moderate retained fecal material seen throughout the colon suggesting fecal stasis. No bowel obstruction. Chronic findings are discussed. **This report has been created using voice recognition software. It may contain minor errors which are inherent in voice recognition technology. **      Final report electronically signed by Dr Jhony Schafer on 8/28/2023 3:47 PM             Consults:     IP CONSULT TO NEPHROLOGY  IP CONSULT TO UROLOGY    Disposition:    [] Home       [] TCU       [] Rehab       [] Psych       [x] SNF       [] [address] [place] [address]       [] Other-    Condition at Discharge: Stable    Code Status:  Full Code     Patient Instructions:    Discharge lab work: BMP  Activity: activity as tolerated  Diet: ADULT DIET; Regular; Low Potassium (Less than 3000 mg/day); Low Phosphorus (Less than 1000 mg)      Follow-up visits:   Jose Issa MD  [facility] [address]  [address] [place] [address]  [phone]    Follow up           Discharge Medications:        Medication List        START taking these medications      cephALEXin 250 MG capsule  Commonly known as: KEFLEX  Take 1 capsule by mouth every 12 hours for 6 days            CONTINUE taking these medications      amLODIPine 5 MG tablet  Commonly known as: NORVASC  Take 1 tablet by mouth daily     aspirin 81 MG tablet     ASTEPRO NA     * buPROPion 300 MG extended release tablet  Commonly known as: Wellbutrin XL  Take with 150 mg for 450 mg daily. * buPROPion 150 MG extended release tablet  Commonly known as: Wellbutrin XL  Take with 300 mg daily for 450 mg daily.      calcitRIOL 0.25 MCG capsule  Commonly known as: ROCALTROL     cetirizine 5 MG tablet  Commonly known as: ZYRTEC  Take 1 tablet by mouth nightly as needed for Allergies     clopidogrel 75 MG tablet  Commonly known as:

## 2023-09-01 NOTE — PROGRESS NOTES
Discharge paperwork given to patient's  to [place].   He voiced understanding,  no concerns noted at this time

## 2023-09-01 NOTE — DISCHARGE INSTR - COC
12/Continuity of Care Form    Patient Name: Andressa Calderon   :  1955  MRN:  [id number]    Admit date:  2023  Discharge date:  2023    Code Status Order: Full Code   Advance Directives:     Admitting Physician:  No admitting provider for patient encounter. PCP: Julia Lundberg MD    [facility] 6[facility]-16/016-A  Discharging Unit Phone Number:     Emergency Contact:   Extended Emergency Contact Information  Primary Emergency Contact: Leydi Seay  Address: [address] [facility], [address] [facility] of [address] [place] Phone: [phone]  Mobile Phone: [phone]  Relation: Spouse   needed? No  Secondary Emergency Contact: Fredy Orellana [facility] of [address] [place] Phone: [phone]  Relation: Brother/Sister   needed?  No    Past Surgical History:  Past Surgical History:   Procedure Laterality Date    CARPAL TUNNEL RELEASE Right     COLONOSCOPY      [facility]    CT BIOPSY RENAL  2022    CT BIOPSY RENAL 2022 [facility] CT SCAN    CYSTOSCOPY Left 2023    CYSTOSCOPY, LEFT URETERAL STENT PLACEMENT performed by Olive Meyer MD at [address] [place] [address], [place], DIAGNOSTIC  unsure    EYE SURGERY      lasik    HEMORRHOID SURGERY  unsure    HYSTERECTOMY (CERVIX STATUS UNKNOWN)      age 34    NECK SURGERY  18  years ago    fusion    OVARY REMOVAL Bilateral     age 34    SINUS SURGERY  10 years ago    TUBAL LIGATION         Immunization History:   Immunization History   Administered Date(s) Administered    COVID-19, MODERNA BLUE border, Primary or Immunocompromised, (age 12y+), IM, 100 mcg/0.5mL 2021, 2021, 2021    COVID-19, PFIZER Bivalent, DO NOT Dilute, (age 12y+), IM, 30 mcg/0.3 mL 10/04/2022    Hep B, HEPLISAV-B, (age 18y+), IM, 0.5mL 2023    Influenza, AFLURIA (age 1 yrs+), FLUZONE, (age 10 mo+), MDV, 0.5mL 2016, 2017    Influenza, FLUAD, (age 72 y+), of Loretta Barlow  is necessary for the continuing treatment of the diagnosis listed and that she requires [address] for greater 30 days.      Update Admission H&P: No change in H&P    PHYSICIAN SIGNATURE:  Electronically signed by Arabella Quintanilla MD on 9/1/23 at 2:49 PM EDT

## 2023-09-05 ENCOUNTER — CLINICAL DOCUMENTATION ONLY (OUTPATIENT)
Facility: CLINIC | Age: 68
End: 2023-09-05

## 2023-09-05 ENCOUNTER — TELEPHONE (OUTPATIENT)
Dept: UROLOGY | Age: 68
End: 2023-09-05

## 2023-09-05 ENCOUNTER — TELEPHONE (OUTPATIENT)
Dept: FAMILY MEDICINE CLINIC | Age: 68
End: 2023-09-05

## 2023-09-05 DIAGNOSIS — R10.9 ABDOMINAL PAIN, UNSPECIFIED ABDOMINAL LOCATION: ICD-10-CM

## 2023-09-05 DIAGNOSIS — N13.30 HYDRONEPHROSIS, UNSPECIFIED HYDRONEPHROSIS TYPE: Primary | ICD-10-CM

## 2023-09-05 NOTE — TELEPHONE ENCOUNTER
Patient scheduled for a Cystoscopy, left ureteroscopy, possible biopsy with Dr. Sergio Hurt on 9/20/2023. We are asking for clearance and direction on holding Plavix and Aspirin from Dr. Joanna Byrnes. Thank you.

## 2023-09-05 NOTE — TELEPHONE ENCOUNTER
Care Transitions Initial Follow Up Call    Outreach made within 2 business days of discharge: Yes    Patient: Amina Jeong Patient : 1955   MRN: [id number]  Reason for Admission: There are no discharge diagnoses documented for the most recent discharge. Discharge Date: 23       Spoke with: patient    Discharge department/facility: [facility]    TCM Interactive Patient Contact:  Was patient able to fill all prescriptions: Yes  Was patient instructed to bring all medications to the follow-up visit: Yes  Is patient taking all medications as directed in the discharge summary?  Yes  Does patient understand their discharge instructions: Yes  Does patient have questions or concerns that need addressed prior to 7-14 day follow up office visit: no    Scheduled appointment with PCP within 7-14 days    Follow Up  Future Appointments   Date Time Provider [address] [place] [address]   2023  1:30 PM Betsy Kearns MD N [facility]CV LAZARO Armstrong   10/30/2023  1:15 PM Christen Monterroso MD N [address] [facility] Heart LAZARO Armstrong   2024  1:30 PM [facility]

## 2023-09-05 NOTE — TELEPHONE ENCOUNTER
DO NOT TAKE  FISH OIL, MOBIC, IBUPROFEN, MOTRIN-LIKE DRUGS AND ANY MULTIVITAMINS OR OVER THE COUNTER SUPPLEMENTS 14 DAYS PRIOR TO SURGERY. HOLD ASPIRIN 5 DAYS PRIOR TO SURGERY  HOLD PLAVIX 5 DAYS PRIOR TO SURGERY    MUST HAVE AN ADULT OVER THE AGE OF 18 WITH YOU AT THE TIME OF THE DISCHARGE AND WITH YOU AT HOME AFTER THE PROCEDURE FOR 24 HOURS        Miguel Lewis 1955 Diagnosis:     Surgical Physician: Dr. Seema Hines have been scheduled for the procedure marked below:      Surgery: Cystoscopy, left ureteroscopy, possible biopsy         Date: 9/20/2023     Anesthesia: Anesthesiologist (General/Spinal)     Place of Service: [facility] Second Floor Same Day Surgery         Arrive to same day surgery at:  12:00pm  (Surgery time is subject to change)      INSTRUCTIONS AS MARKED BELOW:    1.  DO NOT eat or drink anything after midnight before surgery. 2.  We prefer you shower or bathe with an antibacterial soap (Dial) the morning of surgery. 3  Please bring a current medication list, photo ID and insurance card(s) with you  4. Okay to take Tylenol  5. If you take Glucophage, Metformin or Janumet, hold 48-hours prior to surgery  6  Take blood pressure or heart medication as directed, if taken in the morning take with a small sip of water  7. The office will call you in 1-2 days after your procedure to schedule a follow up.                 Date: 9/5/2023

## 2023-09-05 NOTE — TELEPHONE ENCOUNTER
Pre op Risk Assessment    Procedure Cystoscopy, left ureteroscopy, possible biopsy   Physician Joi Dia  Date of surgery/procedure 9/20/23    Last OV 10/28/22  Last Stress 2/15/22  Last Echo 2/15/22  Last Cath None in Epic  Last Stent None in Epic  Is patient on blood thinners ASA and Plavix  Hold Meds/how many days 5    Please send to Dr Kait Peters when he returns. There are no openings prior to the surgery date.

## 2023-09-05 NOTE — TELEPHONE ENCOUNTER
SURGERY [address] [facility] [address] [facility], [address] [facility] [address]      Phone *[phone] *[phone]   Surgical Scheduling Direct Line Phone *[phone] Fax *[phone]      Miguel Ranch 1955 female    [address]  [place] [address]   Marital Status:          Home Phone: [phone]      Cell Phone:    Telephone Information:   Mobile [phone]          Surgeon: Dr. Luciano Low Surgery Date: 9/20/2023   Time: 2:00pm    Procedure: Cystoscopy, left ureteroscopy, possible biopsy    Diagnosis: abdominal pain, hydronephrosis     Important Medical History:  In Epic    Special Inst/Equip:     CPT Codes:    95046  Latex Allergy: No     Cardiac Device:  No    Anesthesia:  General          Admission Type:  Same Day                        Admit Prior to Day of Surgery: No    Case Location:  Main OR            Preadmission Testing:  Phone Call          PAT Date and Time:______________________________________________________    PAT Confirmation #: ______________________________________________________    Post Op Visit: ___________________________________________________________    Need Preop Cardiac Clearance: Yes    Does Patient have Cardiologist/physician?      Dr. Tomasz Fernandez    Surgery Confirmation #: __________________________________________________    Deepthi Velasquez: ________________________   Date: __________________________     [employer] Name: Medicare

## 2023-09-05 NOTE — TELEPHONE ENCOUNTER
Patient is scheduled for surgery with Clarence Wiley on 9/20/2023. Surgery consent to be done on arrival. Dr. Owen Gather to clear. Patient to do pre op URINE CULTURE ON 9/6/2023; ORDERS IN Epic; PATIENT VOICED UNDERSTANDING. Surgery instructions gone over with patient AND mailed to the patient. Patient informed an adult over the age of 25 must be with them at the time of surgery, upon discharge and at home for 24 hours after the procedure.

## 2023-09-06 ENCOUNTER — PREP FOR PROCEDURE (OUTPATIENT)
Dept: UROLOGY | Age: 68
End: 2023-09-06

## 2023-09-06 ENCOUNTER — TELEPHONE (OUTPATIENT)
Dept: UROLOGY | Age: 68
End: 2023-09-06

## 2023-09-06 NOTE — TELEPHONE ENCOUNTER
Patient underwent CYSTOSCOPY, LEFT URETERAL STENT PLACEMENT on 08/29/2023. Patient is scheduled for CYSTOSCOPY, LEFT  URETEROSCOPY, POSS BIOPSY on 09/20/2023. Patient  called with concerns of hematuria. Attempted to call back and left a voicemail to call the office.

## 2023-09-06 NOTE — TELEPHONE ENCOUNTER
The urine is pink tinge. Patient takes plavix and aspirin. She did pass a few small clots yesterday but better today. She denies pain or symptoms of infection. She will be done antibiotics today. Reassured it could be irritation from the stent and taking aspirin and plavix and may see pink tinge urine. Encouraged to push fluids to keep the urine clear. He voiced understanding.

## 2023-09-07 RX ORDER — SODIUM CHLORIDE 0.9 % (FLUSH) 0.9 %
5-40 SYRINGE (ML) INJECTION PRN
Status: CANCELLED | OUTPATIENT
Start: 2023-09-07

## 2023-09-07 RX ORDER — SODIUM CHLORIDE 0.9 % (FLUSH) 0.9 %
5-40 SYRINGE (ML) INJECTION EVERY 12 HOURS SCHEDULED
Status: CANCELLED | OUTPATIENT
Start: 2023-09-07

## 2023-09-07 RX ORDER — SODIUM CHLORIDE 9 MG/ML
INJECTION, SOLUTION INTRAVENOUS PRN
Status: CANCELLED | OUTPATIENT
Start: 2023-09-07

## 2023-09-10 PROBLEM — N39.0 URINARY TRACT INFECTION WITHOUT HEMATURIA: Status: ACTIVE | Noted: 2023-09-10

## 2023-09-10 PROBLEM — Z99.2 ESRD ON HEMODIALYSIS (HCC): Status: ACTIVE | Noted: 2023-04-28

## 2023-09-10 PROBLEM — K59.09 CHRONIC CONSTIPATION: Status: ACTIVE | Noted: 2023-09-10

## 2023-09-10 PROBLEM — D63.8 ANEMIA, CHRONIC DISEASE: Status: ACTIVE | Noted: 2023-09-10

## 2023-09-13 NOTE — PROGRESS NOTES
Message Urbano/Karime at Dr Jeniffer Blackmon office in regards to Did you get UA back? Did you get Dr Kathleen Calero clearance (note said getting recommendation on Plavix and ASA)? She also has abnormal labs HBG 10.5 Hct 34.8 Creatinine 5.9 BUN 40 CO2-18.   thanks

## 2023-09-13 NOTE — PROGRESS NOTES
Called [facility] at [phone]. They are faxing last dose mar with current wt and ht on it.  Also having nurse call back to go over information for upcoming surgery

## 2023-09-13 NOTE — PROGRESS NOTES
Who will be transporting patient? The [address] [place] [address]  Who will be with patient? Nurse is not sure -does have a lot of family here  Is the patient oriented? Yes x 3    Height:              Weight:    Does patient have any assistive devices? no  Is the patient weight bearing? yes  How many people are needed to move the patient? Maybe 2 assist  Does the patient have a pacemaker or AICD? Medication instructions given  Bring list of medications with dose and frequency.     Please fax medication list and send list of allergies    NPO after midnight  Bring insurance info and drivers license  Wear comfortable clean clothing  Do not bring jewelry or valuables  Shower night before and morning of surgery with a liquid antibacterial soap  Follow all instructions given by your physician   needed at discharge  Name of nurse you spoke with; Gato Matute

## 2023-09-14 NOTE — PATIENT INSTRUCTIONS
"[facility] [[id number]]  Chief Complaint   Patient presents with    Follow Up     GHD     Initial /75 (BP Location: Right arm, Patient Position: Sitting, Cuff Size: Adult Small)   Pulse 104   Ht 5' 1.34\" (155.8 cm)   Wt 80 lb 7.5 oz (36.5 kg)   BMI 15.04 kg/m   Estimated body mass index is 15.04 kg/m  as calculated from the following:    Height as of this encounter: 5' 1.34\" (155.8 cm).    Weight as of this encounter: 80 lb 7.5 oz (36.5 kg).  Medication Reconciliation: complete    Does the patient need any medication refills today? No    Does the patient/parent need MyChart or Proxy acces today? No    Does the patient want a flu shot today? No          " You may receive a survey regarding the care you received during your visit. Your input is valuable to us. We encourage you to complete and return your survey. We hope you will choose us in the future for your healthcare needs.

## 2023-09-14 NOTE — TELEPHONE ENCOUNTER
Form in Dr Hugo Milan box for signature. Subjective   Jean Noriega is a 73 y.o. male.     History of Present Illness follow-up diabetes hypertension.  Has visited with cardiology and has discontinued antihypertensives at this time but is monitoring BP and episodically using losartan at low-dose.  Has maintained follow-up with oncology.  Maintains follow-up with GI.  Available records reviewed but most not available.  The arm laceration has improved.  Still not as compliant with nutrition and hydration as needs to be.  Denies respiratory CDV GI  orthopedic chronic skin changes.  States blood sugar does well.  Again monitoring blood pressure and is trying to improve nutrition and hydration.  Very very physically active.    The following portions of the patient's history were reviewed and updated as appropriate: current medications, past family history, past medical history, past social history, past surgical history and problem list.    Review of Systems  See history of Present Illness     Objective     Physical Exam  Vitals reviewed.   Constitutional:       Appearance: Normal appearance.   HENT:      Head: Normocephalic.   Eyes:      Conjunctiva/sclera: Conjunctivae normal.   Cardiovascular:      Rate and Rhythm: Normal rate and regular rhythm.      Heart sounds: Normal heart sounds.   Pulmonary:      Effort: Pulmonary effort is normal.      Breath sounds: Normal breath sounds.   Musculoskeletal:      Cervical back: Normal range of motion and neck supple.   Skin:     General: Skin is warm and dry.   Neurological:      Mental Status: He is alert and oriented to person, place, and time.   Psychiatric:         Mood and Affect: Mood normal.         PHQ-9 Total Score:      BMI is within normal parameters. No other follow-up for BMI required.        Assessment & Plan     Diagnoses and all orders for this visit:    1. Type 2 diabetes mellitus without complication, without long-term current use of insulin (HCC) (Primary)  -     CBC & Differential  -      Comprehensive Metabolic Panel  -     Hemoglobin A1c    2. Benign essential HTN  -     CBC & Differential  -     Comprehensive Metabolic Panel    3. Mixed hyperlipidemia  -     CBC & Differential  -     Comprehensive Metabolic Panel    4. B12 deficiency  -     Vitamin B12    5. Nutritional deficiency  -     Vitamin B12      Stable.  Check labs.  Work on nutrition hydration as we discussed.  Do not skip meals avoid this excess heat.  Maintain pandemic response.  Keep follow-ups as directed.  We will notify you of these results.  Follow-up in this clinic sooner than scheduled time if needed.                   This document has been electronically signed by Demetrius Wilkerson MD   June 20, 2022 09:23 EDT    Part of this note may be an electronic transcription/translation of spoken language to printed text using the Dragon Dictation System.

## 2023-09-14 NOTE — TELEPHONE ENCOUNTER
May proceed with lod to mod risk    Pat taking asa and plavix for HX CVA- that need to be addressed by her Neurologist or pcp

## 2023-09-15 ENCOUNTER — APPOINTMENT (OUTPATIENT)
Dept: GENERAL RADIOLOGY | Age: 68
End: 2023-09-15
Payer: MEDICARE

## 2023-09-15 ENCOUNTER — HOSPITAL ENCOUNTER (EMERGENCY)
Age: 68
Discharge: HOME | End: 2023-09-15
Attending: FAMILY MEDICINE
Payer: MEDICARE

## 2023-09-15 VITALS
RESPIRATION RATE: 18 BRPM | TEMPERATURE: 97.5 F | HEIGHT: 60 IN | DIASTOLIC BLOOD PRESSURE: 71 MMHG | WEIGHT: 100 LBS | SYSTOLIC BLOOD PRESSURE: 144 MMHG | HEART RATE: 85 BPM | BODY MASS INDEX: 19.63 KG/M2 | OXYGEN SATURATION: 100 %

## 2023-09-15 DIAGNOSIS — K59.09 OTHER CONSTIPATION: Primary | ICD-10-CM

## 2023-09-15 DIAGNOSIS — Z99.2 ESRD (END STAGE RENAL DISEASE) ON DIALYSIS (HCC): ICD-10-CM

## 2023-09-15 DIAGNOSIS — K59.09 CHRONIC CONSTIPATION: ICD-10-CM

## 2023-09-15 DIAGNOSIS — N18.6 ESRD (END STAGE RENAL DISEASE) ON DIALYSIS (HCC): ICD-10-CM

## 2023-09-15 LAB
ALBUMIN SERPL BCG-MCNC: 3.8 G/DL (ref 3.5–5.1)
ALP SERPL-CCNC: 105 U/L (ref 38–126)
ALT SERPL W/O P-5'-P-CCNC: 69 U/L (ref 11–66)
ANION GAP SERPL CALC-SCNC: 15 MEQ/L (ref 8–16)
AST SERPL-CCNC: 34 U/L (ref 5–40)
BACTERIA URNS QL MICRO: ABNORMAL /HPF
BASOPHILS ABSOLUTE: 0.1 THOU/MM3 (ref 0–0.1)
BASOPHILS NFR BLD AUTO: 1 %
BILIRUB SERPL-MCNC: 0.2 MG/DL (ref 0.3–1.2)
BILIRUB UR QL STRIP.AUTO: NEGATIVE
BUN SERPL-MCNC: 59 MG/DL (ref 7–22)
CALCIUM SERPL-MCNC: 10.5 MG/DL (ref 8.5–10.5)
CASTS #/AREA URNS LPF: ABNORMAL /LPF
CASTS 2: ABNORMAL /LPF
CHARACTER UR: CLEAR
CHLORIDE SERPL-SCNC: 101 MEQ/L (ref 98–111)
CO2 SERPL-SCNC: 23 MEQ/L (ref 23–33)
COLOR: YELLOW
CREAT SERPL-MCNC: 7.2 MG/DL (ref 0.4–1.2)
CRYSTALS URNS MICRO: ABNORMAL
DEPRECATED RDW RBC AUTO: 48.8 FL (ref 35–45)
EKG ATRIAL RATE: 90 BPM
EKG P AXIS: 70 DEGREES
EKG P-R INTERVAL: 158 MS
EKG Q-T INTERVAL: 360 MS
EKG QRS DURATION: 124 MS
EKG QTC CALCULATION (BAZETT): 440 MS
EKG R AXIS: 62 DEGREES
EKG T AXIS: 57 DEGREES
EKG VENTRICULAR RATE: 90 BPM
EOSINOPHIL NFR BLD AUTO: 3.5 %
EOSINOPHILS ABSOLUTE: 0.3 THOU/MM3 (ref 0–0.4)
EPITHELIAL CELLS, UA: ABNORMAL /HPF
ERYTHROCYTE [DISTWIDTH] IN BLOOD BY AUTOMATED COUNT: 13.8 % (ref 11.5–14.5)
GFR SERPL CREATININE-BSD FRML MDRD: 6 ML/MIN/1.73M2
GLUCOSE SERPL-MCNC: 98 MG/DL (ref 70–108)
GLUCOSE UR QL STRIP.AUTO: NEGATIVE MG/DL
HCT VFR BLD AUTO: 30.3 % (ref 37–47)
HGB BLD-MCNC: 9.1 GM/DL (ref 12–16)
HGB UR QL STRIP.AUTO: ABNORMAL
IMM GRANULOCYTES # BLD AUTO: 0.1 THOU/MM3 (ref 0–0.07)
IMM GRANULOCYTES NFR BLD AUTO: 1.4 %
KETONES UR QL STRIP.AUTO: NEGATIVE
LYMPHOCYTES ABSOLUTE: 1.5 THOU/MM3 (ref 1–4.8)
LYMPHOCYTES NFR BLD AUTO: 21.5 %
MAGNESIUM SERPL-MCNC: 2.6 MG/DL (ref 1.6–2.4)
MCH RBC QN AUTO: 29.2 PG (ref 26–33)
MCHC RBC AUTO-ENTMCNC: 30 GM/DL (ref 32.2–35.5)
MCV RBC AUTO: 97.1 FL (ref 81–99)
MISCELLANEOUS 2: ABNORMAL
MONOCYTES ABSOLUTE: 0.7 THOU/MM3 (ref 0.4–1.3)
MONOCYTES NFR BLD AUTO: 9.5 %
NEUTROPHILS NFR BLD AUTO: 63.1 %
NITRITE UR QL STRIP: NEGATIVE
NRBC BLD AUTO-RTO: 0 /100 WBC
OSMOLALITY SERPL CALC.SUM OF ELEC: 294.1 MOSMOL/KG (ref 275–300)
PH UR STRIP.AUTO: 7 [PH] (ref 5–9)
PLATELET # BLD AUTO: 244 THOU/MM3 (ref 130–400)
PMV BLD AUTO: 9.8 FL (ref 9.4–12.4)
POTASSIUM SERPL-SCNC: 4.1 MEQ/L (ref 3.5–5.2)
PROT SERPL-MCNC: 6.1 G/DL (ref 6.1–8)
PROT UR STRIP.AUTO-MCNC: 30 MG/DL
RBC # BLD AUTO: 3.12 MILL/MM3 (ref 4.2–5.4)
RBC URINE: ABNORMAL /HPF
RENAL EPI CELLS #/AREA URNS HPF: ABNORMAL /[HPF]
SEGMENTED NEUTROPHILS ABSOLUTE COUNT: 4.5 THOU/MM3 (ref 1.8–7.7)
SODIUM SERPL-SCNC: 139 MEQ/L (ref 135–145)
SP GR UR REFRACT.AUTO: 1.01 (ref 1–1.03)
TSH SERPL DL<=0.005 MIU/L-ACNC: 2.15 UIU/ML (ref 0.4–4.2)
UROBILINOGEN, URINE: 0.2 EU/DL (ref 0–1)
WBC # BLD AUTO: 7.2 THOU/MM3 (ref 4.8–10.8)
WBC #/AREA URNS HPF: ABNORMAL /HPF
WBC #/AREA URNS HPF: ABNORMAL /[HPF]
YEAST LIKE FUNGI URNS QL MICRO: ABNORMAL

## 2023-09-15 PROCEDURE — 81001 URINALYSIS AUTO W/SCOPE: CPT

## 2023-09-15 PROCEDURE — 80053 COMPREHEN METABOLIC PANEL: CPT

## 2023-09-15 PROCEDURE — 6370000000 HC RX 637 (ALT 250 FOR IP)

## 2023-09-15 PROCEDURE — 85025 COMPLETE CBC W/AUTO DIFF WBC: CPT

## 2023-09-15 PROCEDURE — 36415 COLL VENOUS BLD VENIPUNCTURE: CPT

## 2023-09-15 PROCEDURE — 74018 RADEX ABDOMEN 1 VIEW: CPT

## 2023-09-15 PROCEDURE — 93005 ELECTROCARDIOGRAM TRACING: CPT

## 2023-09-15 PROCEDURE — 83735 ASSAY OF MAGNESIUM: CPT

## 2023-09-15 PROCEDURE — 6370000000 HC RX 637 (ALT 250 FOR IP): Performed by: FAMILY MEDICINE

## 2023-09-15 PROCEDURE — 93010 ELECTROCARDIOGRAM REPORT: CPT | Performed by: INTERNAL MEDICINE

## 2023-09-15 PROCEDURE — 99285 EMERGENCY DEPT VISIT HI MDM: CPT

## 2023-09-15 PROCEDURE — 84443 ASSAY THYROID STIM HORMONE: CPT

## 2023-09-15 RX ORDER — SENNOSIDES 8.6 MG
1 TABLET ORAL DAILY
Qty: 120 TABLET | Refills: 0 | Status: SHIPPED | OUTPATIENT
Start: 2023-09-15 | End: 2023-09-15 | Stop reason: SDUPTHER

## 2023-09-15 RX ORDER — MINERAL OIL 100 G/100G
1 OIL RECTAL ONCE
Status: COMPLETED | OUTPATIENT
Start: 2023-09-15 | End: 2023-09-15

## 2023-09-15 RX ORDER — SENNOSIDES 8.6 MG
1 TABLET ORAL DAILY
Qty: 120 TABLET | Refills: 0 | Status: SHIPPED | OUTPATIENT
Start: 2023-09-15

## 2023-09-15 RX ORDER — POLYETHYLENE GLYCOL 3350, SODIUM CHLORIDE, POTASSIUM CHLORIDE, SODIUM BICARBONATE, AND SODIUM SULFATE 240; 5.84; 2.98; 6.72; 22.72 G/4L; G/4L; G/4L; G/4L; G/4L
4000 POWDER, FOR SOLUTION ORAL ONCE
Qty: 4000 ML | Refills: 0 | Status: SHIPPED | OUTPATIENT
Start: 2023-09-15 | End: 2023-09-15 | Stop reason: SDUPTHER

## 2023-09-15 RX ORDER — POLYETHYLENE GLYCOL 3350, SODIUM CHLORIDE, POTASSIUM CHLORIDE, SODIUM BICARBONATE, AND SODIUM SULFATE 240; 5.84; 2.98; 6.72; 22.72 G/4L; G/4L; G/4L; G/4L; G/4L
4000 POWDER, FOR SOLUTION ORAL ONCE
Qty: 4000 ML | Refills: 0 | Status: SHIPPED | OUTPATIENT
Start: 2023-09-15 | End: 2023-09-15

## 2023-09-15 RX ORDER — ONDANSETRON 4 MG/1
4 TABLET, ORALLY DISINTEGRATING ORAL ONCE
Status: COMPLETED | OUTPATIENT
Start: 2023-09-15 | End: 2023-09-15

## 2023-09-15 RX ORDER — MINERAL OIL 100 G/100G
1 OIL RECTAL ONCE
Status: DISCONTINUED | OUTPATIENT
Start: 2023-09-15 | End: 2023-09-15

## 2023-09-15 RX ADMIN — ONDANSETRON 4 MG: 4 TABLET, ORALLY DISINTEGRATING ORAL at 12:50

## 2023-09-15 RX ADMIN — MINERAL OIL 1 ENEMA: 100 ENEMA RECTAL at 14:54

## 2023-09-15 ASSESSMENT — ENCOUNTER SYMPTOMS
CONSTIPATION: 1
ABDOMINAL DISTENTION: 0
VOMITING: 0
BLOOD IN STOOL: 0
COUGH: 0
BACK PAIN: 1
SHORTNESS OF BREATH: 0
NAUSEA: 1
ABDOMINAL PAIN: 1
RHINORRHEA: 0

## 2023-09-15 ASSESSMENT — PAIN - FUNCTIONAL ASSESSMENT
PAIN_FUNCTIONAL_ASSESSMENT: 0-10

## 2023-09-15 ASSESSMENT — PAIN SCALES - GENERAL
PAINLEVEL_OUTOF10: 10
PAINLEVEL_OUTOF10: 10
PAINLEVEL_OUTOF10: 7

## 2023-09-15 ASSESSMENT — PAIN DESCRIPTION - ORIENTATION: ORIENTATION: LOWER

## 2023-09-15 ASSESSMENT — PAIN DESCRIPTION - LOCATION: LOCATION: BACK

## 2023-09-15 NOTE — ED NOTES
RN informed Dr Dayron Prabhakar at pt had not had a bowel movement and soap suds enema complete.       Ruben George RN  09/15/23 [id number]

## 2023-09-15 NOTE — ED NOTES
RN informed Dr Saurabh Lundberg pt unable to have bowel movement.       Cadence Way RN  09/15/23 [id number]

## 2023-09-15 NOTE — ED NOTES
**This is a Medical/PA/APRN Student Note and is charted for educational purposes. The non-physician staff attested note is not to be used for billing purposes, chart documentation or to guide patient care. Please see the supervising physician/PA/APRN modifications/attestation for treatment plan/chart documentation/suggestions. This note has been reviewed and feedback has been provided to the student. **      MEDICAL STUDENT NOTE    Chief Complaint   Patient presents with    Constipation     History obtained from the patient and . SUNIL King is a 69 y.o. female with history of ESRD on iHD, CVA, HFpEF, IBS, presenting with 7 day history of constipation. She was recently hospitalized (8/28-9/1) for ureterolithiasis and UTI requiring ureteral stent placement on 8/29. She was discharged to SNF on 9/1 for deconditioning and is still admitted there. She has not had a bowel movement for the past week. She has taken Linzess, lactulose, miralax without success. She received enema x1 yesterday which did not produce a bowel movement. She is not taking any narcotic pain medications. She states that she has presented to the ED for this problem several months ago, received an enema, and was able to have a bowel movement. Over the past few days, she has had worsening nausea, loss of appetite, diffuse abdominal pain, and back pain. She has a remote history of hysterectomy & bilateral salpingo-oophorectomy. She denies any other abdominal surgeries. Of note, she did not receive iHD on 9/13. Her appointment is today, 9/15 at 1500 but she doesn't think she will be able to make it since she is in the ED for constipation. She states that they can reschedule her for tomorrow instead. Review of Systems   Constitutional:  Positive for appetite change and fatigue. Negative for chills and fever. HENT:  Negative for congestion, rhinorrhea and sneezing. Eyes:  Negative for visual disturbance.    Respiratory:

## 2023-09-15 NOTE — ED NOTES
Presents to ED  with complaints of constipation. Pt reports it has been 7 days since her last normal bowel movement. Pt reports she has tried lactulose, miralax and an enema last night with no relief. She reports she is a dialysis pt and receives dialysis M W F, but states she is not going today due to having constipation.      Leanna Robles RN  09/15/23 6968

## 2023-09-15 NOTE — DISCHARGE INSTRUCTIONS
WE GAVE YOU TWO ENEMAS TODAY IN THE ER, INCLUDING MINERAL AND SOAP SUDS ENEMAS. HOPEFULLY THOSE HAVE LOOSENED THINGS UP. ADD SENNA TO YOUR REGIMEN AS WELL. PLEASE CONSIDER DOING / DRINKING THE BOWEL PREP IN THE NEXT DAY OR SO, WHICH COULD HELP ALSO. GO TO YOUR DIALYSIS SESSION TOMORROW MORNING.

## 2023-09-15 NOTE — ED PROVIDER NOTES
EMERGENCY DEPARTMENT ENCOUNTER     CHIEF COMPLAINT   Chief Complaint   Patient presents with    Constipation        HPI   Dieudonne Osuna is a 69 y.o. female with a hx of ESRD, anemia, CHF, on MWF dialysis, who presents with constipation and abdominal pain, onset was last week. Pt has chronic constipation. The duration has been constant since the onset. The The patient has associated nausea but denies vomiting or pain. There are no alleviating factors. The context is that the symptoms started spontaneously, without any known precipitants. Pt also endorsed a transient episode of substernal chest pain two nights ago, which has dissipated. She is currently chest pain free. She has taken her daily colace and miralax to no avail. Per pt, ever since she had her CVA episodes, she always has had constipation.       PAST MEDICAL & SURGICAL HISTORY   Past Medical History:   Diagnosis Date    Allergic rhinitis     Anemia in CKD (chronic kidney disease)     Anxiety     CHF (congestive heart failure) (HCC)     CVA (cerebral infarction)     Depression     Fibromyalgia     Headache(784.0)     Hemiplegia and hemiparesis following cerebral infarction affecting left non-dominant side (HCC)     Hydronephrosis 09/01/2023    Urogenital Implants, calculus of ureter, UTI    Hyperlipidemia     Hypertension     Irritable bowel syndrome     Kidney failure     Chronic Kidney Disease, Renal Dialysis    Unspecified cerebral artery occlusion with cerebral infarction     Unspecified sleep apnea      Past Surgical History:   Procedure Laterality Date    CARPAL TUNNEL RELEASE Right     COLONOSCOPY  2012    [facility]    CT BIOPSY RENAL  12/28/2022    [facility]    CYST[facility]COPY Left 8/29/2023    CYSTOSCOPY, LEFT URETERAL STENT PLACEMENT performed by Kecia Collier MD at [address] [place] [address], DIAGNOSTIC  unsure    EYE SURGERY      lasik    HEMORRHOID SURGERY  unsure    HYSTERECTOMY (CERVIX STATUS UNKNOWN)      age 34

## 2023-09-18 ENCOUNTER — TELEPHONE (OUTPATIENT)
Dept: UROLOGY | Age: 68
End: 2023-09-18

## 2023-09-18 ASSESSMENT — ENCOUNTER SYMPTOMS
DIARRHEA: 0
NAUSEA: 1
BLOOD IN STOOL: 0
VOMITING: 0
CONSTIPATION: 1
ABDOMINAL PAIN: 0

## 2023-09-18 NOTE — PROGRESS NOTES
calcitRIOL (ROCALTROL) 0.25 MCG capsule, Take 1 capsule by mouth daily, Disp: , Rfl:     amLODIPine (NORVASC) 5 MG tablet, Take 1 tablet by mouth daily, Disp: 30 tablet, Rfl: 5    LACTULOSE PO, Take by mouth, Disp: , Rfl:     simvastatin (ZOCOR) 20 MG tablet, TAKE 1 TABLET BY MOUTH DAILY, Disp: 90 tablet, Rfl: 3    sevelamer (RENVELA) 800 MG tablet, TAKE 1 TABLET BY MOUTH THREE TIMES A DAY WITH MEALS, Disp: , Rfl:     linaclotide (LINZESS) 145 MCG capsule, Take 2 capsules by mouth every morning (before breakfast), Disp: , Rfl:     Omega-3 Fatty Acids (FISH OIL) 1360 MG CAPS, Take by mouth, Disp: , Rfl:     docusate sodium (COLACE) 100 MG capsule, Take 1 capsule by mouth 2 times daily, Disp: , Rfl:     montelukast (SINGULAIR) 10 MG tablet, Take 1 tablet by mouth daily, Disp: 90 tablet, Rfl: 3    ondansetron (ZOFRAN-ODT) 4 MG disintegrating tablet, Take 1 tablet by mouth 3 times daily as needed for Nausea or Vomiting, Disp: 21 tablet, Rfl: 0    cetirizine (ZYRTEC) 5 MG tablet, Take 1 tablet by mouth nightly as needed for Allergies, Disp: 30 tablet, Rfl: 0    traZODone (DESYREL) 50 MG tablet, TAKE 1 TABLET NIGHTLY, Disp: 90 tablet, Rfl: 3    folic acid (FOLVITE) 735 MCG tablet, Take 1 tablet by mouth daily, Disp: , Rfl:     aspirin 81 MG tablet, Take 1 tablet by mouth daily, Disp: , Rfl:     glucose monitoring (FREESTYLE FREEDOM) kit, 1 kit by Does not apply route daily (Patient not taking: Reported on 8/28/2023), Disp: 1 kit, Rfl: 0    glucose monitoring (FREESTYLE FREEDOM) kit, 1 kit by Does not apply route daily (Patient not taking: Reported on 8/28/2023), Disp: 1 kit, Rfl: 0    blood glucose monitor strips, Test 1-2x/day.  (Patient not taking: Reported on 8/28/2023), Disp: 100 strip, Rfl: 0    Lancets MISC, 1 each by Does not apply route 2 times daily (Patient not taking: Reported on 8/28/2023), Disp: 100 each, Rfl: 0    triamcinolone (NASACORT) 55 MCG/ACT nasal inhaler, 2 sprays by Each Nostril route daily As

## 2023-09-18 NOTE — PROGRESS NOTES
Message Dr Marika Dove office in regards abnormal HCT, HBG.  Urine creatinine etc done when pt went into ER on 9-15-23 for constipation    Also asked if pt needed clearance from neurologist?

## 2023-09-19 ENCOUNTER — TELEPHONE (OUTPATIENT)
Dept: FAMILY MEDICINE CLINIC | Age: 68
End: 2023-09-19

## 2023-09-19 ENCOUNTER — OFFICE VISIT (OUTPATIENT)
Dept: FAMILY MEDICINE CLINIC | Age: 68
End: 2023-09-19
Payer: MEDICARE

## 2023-09-19 VITALS
WEIGHT: 94.6 LBS | HEART RATE: 68 BPM | RESPIRATION RATE: 16 BRPM | DIASTOLIC BLOOD PRESSURE: 70 MMHG | SYSTOLIC BLOOD PRESSURE: 120 MMHG | TEMPERATURE: 97.7 F | BODY MASS INDEX: 18.48 KG/M2

## 2023-09-19 DIAGNOSIS — N13.30 HYDRONEPHROSIS OF LEFT KIDNEY: ICD-10-CM

## 2023-09-19 DIAGNOSIS — K59.09 OTHER CONSTIPATION: Primary | ICD-10-CM

## 2023-09-19 PROCEDURE — 1090F PRES/ABSN URINE INCON ASSESS: CPT | Performed by: FAMILY MEDICINE

## 2023-09-19 PROCEDURE — 3078F DIAST BP <80 MM HG: CPT | Performed by: FAMILY MEDICINE

## 2023-09-19 PROCEDURE — 1123F ACP DISCUSS/DSCN MKR DOCD: CPT | Performed by: FAMILY MEDICINE

## 2023-09-19 PROCEDURE — 1111F DSCHRG MED/CURRENT MED MERGE: CPT | Performed by: FAMILY MEDICINE

## 2023-09-19 PROCEDURE — G8427 DOCREV CUR MEDS BY ELIG CLIN: HCPCS | Performed by: FAMILY MEDICINE

## 2023-09-19 PROCEDURE — G8419 CALC BMI OUT NRM PARAM NOF/U: HCPCS | Performed by: FAMILY MEDICINE

## 2023-09-19 PROCEDURE — 3017F COLORECTAL CA SCREEN DOC REV: CPT | Performed by: FAMILY MEDICINE

## 2023-09-19 PROCEDURE — 99213 OFFICE O/P EST LOW 20 MIN: CPT | Performed by: FAMILY MEDICINE

## 2023-09-19 PROCEDURE — 1036F TOBACCO NON-USER: CPT | Performed by: FAMILY MEDICINE

## 2023-09-19 PROCEDURE — G8399 PT W/DXA RESULTS DOCUMENT: HCPCS | Performed by: FAMILY MEDICINE

## 2023-09-19 PROCEDURE — 3074F SYST BP LT 130 MM HG: CPT | Performed by: FAMILY MEDICINE

## 2023-09-19 NOTE — TELEPHONE ENCOUNTER
Left message for Alisson Hanson at [facility] urology. Patient was seen by Dr. Kathe Sethi and  stated our office was suppose to receive a fax regarding patient's plavix. Requested for form to be faxed to office or to return call with any questions.

## 2023-09-19 NOTE — TELEPHONE ENCOUNTER
Spoke with Dr. Jenifer Rosario. Patient reported that she has held plavix and aspirin for 5 days as of 9/19/23.

## 2023-09-19 NOTE — TELEPHONE ENCOUNTER
Office note from 9/19/23 and telephone encounter 9/19/23 faxed to urology ATTN: Nelda Siemens [phone]. Patient  is aware.

## 2023-09-20 ENCOUNTER — ANESTHESIA EVENT (OUTPATIENT)
Dept: OPERATING ROOM | Age: 68
End: 2023-09-20
Payer: MEDICARE

## 2023-09-20 ENCOUNTER — HOSPITAL ENCOUNTER (OUTPATIENT)
Age: 68
Setting detail: OUTPATIENT SURGERY
Discharge: HOME | End: 2023-09-20
Attending: UROLOGY | Admitting: UROLOGY
Payer: MEDICARE

## 2023-09-20 ENCOUNTER — ANESTHESIA (OUTPATIENT)
Dept: OPERATING ROOM | Age: 68
End: 2023-09-20
Payer: MEDICARE

## 2023-09-20 VITALS
DIASTOLIC BLOOD PRESSURE: 75 MMHG | SYSTOLIC BLOOD PRESSURE: 175 MMHG | TEMPERATURE: 98.1 F | BODY MASS INDEX: 18.46 KG/M2 | HEIGHT: 60 IN | WEIGHT: 94 LBS | RESPIRATION RATE: 16 BRPM | HEART RATE: 86 BPM | OXYGEN SATURATION: 98 %

## 2023-09-20 DIAGNOSIS — G89.18 POSTOPERATIVE PAIN: Primary | ICD-10-CM

## 2023-09-20 LAB
EKG ATRIAL RATE: 90 BPM
EKG P AXIS: 70 DEGREES
EKG P-R INTERVAL: 158 MS
EKG Q-T INTERVAL: 360 MS
EKG QRS DURATION: 124 MS
EKG QTC CALCULATION (BAZETT): 440 MS
EKG R AXIS: 62 DEGREES
EKG T AXIS: 57 DEGREES
EKG VENTRICULAR RATE: 90 BPM
INR PPP: 0.81 (ref 0.85–1.13)
POTASSIUM SERPL-SCNC: 3.9 MEQ/L (ref 3.5–5.2)

## 2023-09-20 PROCEDURE — 6360000002 HC RX W HCPCS

## 2023-09-20 PROCEDURE — 84132 ASSAY OF SERUM POTASSIUM: CPT

## 2023-09-20 PROCEDURE — 7100000011 HC PHASE II RECOVERY - ADDTL 15 MIN: Performed by: UROLOGY

## 2023-09-20 PROCEDURE — 6360000002 HC RX W HCPCS: Performed by: NURSE ANESTHETIST, CERTIFIED REGISTERED

## 2023-09-20 PROCEDURE — 2720000010 HC SURG SUPPLY STERILE: Performed by: UROLOGY

## 2023-09-20 PROCEDURE — C2617 STENT, NON-COR, TEM W/O DEL: HCPCS | Performed by: UROLOGY

## 2023-09-20 PROCEDURE — 7100000001 HC PACU RECOVERY - ADDTL 15 MIN: Performed by: UROLOGY

## 2023-09-20 PROCEDURE — 2580000003 HC RX 258: Performed by: UROLOGY

## 2023-09-20 PROCEDURE — 3700000001 HC ADD 15 MINUTES (ANESTHESIA): Performed by: UROLOGY

## 2023-09-20 PROCEDURE — 3600000013 HC SURGERY LEVEL 3 ADDTL 15MIN: Performed by: UROLOGY

## 2023-09-20 PROCEDURE — 7100000000 HC PACU RECOVERY - FIRST 15 MIN: Performed by: UROLOGY

## 2023-09-20 PROCEDURE — 2709999900 HC NON-CHARGEABLE SUPPLY: Performed by: UROLOGY

## 2023-09-20 PROCEDURE — 36415 COLL VENOUS BLD VENIPUNCTURE: CPT

## 2023-09-20 PROCEDURE — C1758 CATHETER, URETERAL: HCPCS | Performed by: UROLOGY

## 2023-09-20 PROCEDURE — 85610 PROTHROMBIN TIME: CPT

## 2023-09-20 PROCEDURE — 3700000000 HC ANESTHESIA ATTENDED CARE: Performed by: UROLOGY

## 2023-09-20 PROCEDURE — C1769 GUIDE WIRE: HCPCS | Performed by: UROLOGY

## 2023-09-20 PROCEDURE — C1747 HC ENDOSCOPE, SINGLE, URINARY TRACT: HCPCS | Performed by: UROLOGY

## 2023-09-20 PROCEDURE — 3600000003 HC SURGERY LEVEL 3 BASE: Performed by: UROLOGY

## 2023-09-20 PROCEDURE — 7100000010 HC PHASE II RECOVERY - FIRST 15 MIN: Performed by: UROLOGY

## 2023-09-20 PROCEDURE — 6360000002 HC RX W HCPCS: Performed by: UROLOGY

## 2023-09-20 DEVICE — URETERAL STENT
Type: IMPLANTABLE DEVICE | Site: URETER | Status: FUNCTIONAL
Brand: PERCUFLEX™ PLUS

## 2023-09-20 RX ORDER — PROPOFOL 10 MG/ML
INJECTION, EMULSION INTRAVENOUS PRN
Status: DISCONTINUED | OUTPATIENT
Start: 2023-09-20 | End: 2023-09-20 | Stop reason: SDUPTHER

## 2023-09-20 RX ORDER — MEPERIDINE HYDROCHLORIDE 25 MG/ML
INJECTION INTRAMUSCULAR; INTRAVENOUS; SUBCUTANEOUS
Status: COMPLETED
Start: 2023-09-20 | End: 2023-09-20

## 2023-09-20 RX ORDER — DEXAMETHASONE SODIUM PHOSPHATE 4 MG/ML
INJECTION, SOLUTION INTRA-ARTICULAR; INTRALESIONAL; INTRAMUSCULAR; INTRAVENOUS; SOFT TISSUE PRN
Status: DISCONTINUED | OUTPATIENT
Start: 2023-09-20 | End: 2023-09-20 | Stop reason: SDUPTHER

## 2023-09-20 RX ORDER — MEPERIDINE HYDROCHLORIDE 25 MG/ML
12.5 INJECTION INTRAMUSCULAR; INTRAVENOUS; SUBCUTANEOUS EVERY 5 MIN PRN
Status: DISCONTINUED | OUTPATIENT
Start: 2023-09-20 | End: 2023-09-20 | Stop reason: HOSPADM

## 2023-09-20 RX ORDER — SODIUM CHLORIDE 9 MG/ML
INJECTION, SOLUTION INTRAVENOUS PRN
Status: DISCONTINUED | OUTPATIENT
Start: 2023-09-20 | End: 2023-09-20 | Stop reason: HOSPADM

## 2023-09-20 RX ORDER — TAMSULOSIN HYDROCHLORIDE 0.4 MG/1
0.4 CAPSULE ORAL DAILY
Qty: 14 CAPSULE | Refills: 2 | Status: SHIPPED | OUTPATIENT
Start: 2023-09-20 | End: 2023-10-04

## 2023-09-20 RX ORDER — HYDROCODONE BITARTRATE AND ACETAMINOPHEN 5; 325 MG/1; MG/1
1 TABLET ORAL ONCE
Status: DISCONTINUED | OUTPATIENT
Start: 2023-09-20 | End: 2023-09-20 | Stop reason: HOSPADM

## 2023-09-20 RX ORDER — HYDROCODONE BITARTRATE AND ACETAMINOPHEN 5; 325 MG/1; MG/1
1 TABLET ORAL EVERY 6 HOURS PRN
Qty: 12 TABLET | Refills: 0 | Status: SHIPPED | OUTPATIENT
Start: 2023-09-20 | End: 2023-09-25

## 2023-09-20 RX ORDER — SODIUM CHLORIDE 0.9 % (FLUSH) 0.9 %
5-40 SYRINGE (ML) INJECTION PRN
Status: DISCONTINUED | OUTPATIENT
Start: 2023-09-20 | End: 2023-09-20 | Stop reason: HOSPADM

## 2023-09-20 RX ORDER — OXYBUTYNIN CHLORIDE 10 MG/1
10 TABLET, EXTENDED RELEASE ORAL DAILY
Qty: 7 TABLET | Refills: 0 | Status: SHIPPED | OUTPATIENT
Start: 2023-09-20 | End: 2023-09-27

## 2023-09-20 RX ORDER — LIDOCAINE HCL/PF 100 MG/5ML
SYRINGE (ML) INJECTION PRN
Status: DISCONTINUED | OUTPATIENT
Start: 2023-09-20 | End: 2023-09-20 | Stop reason: SDUPTHER

## 2023-09-20 RX ORDER — SODIUM CHLORIDE 0.9 % (FLUSH) 0.9 %
5-40 SYRINGE (ML) INJECTION EVERY 12 HOURS SCHEDULED
Status: DISCONTINUED | OUTPATIENT
Start: 2023-09-20 | End: 2023-09-20 | Stop reason: HOSPADM

## 2023-09-20 RX ORDER — ONDANSETRON 2 MG/ML
INJECTION INTRAMUSCULAR; INTRAVENOUS PRN
Status: DISCONTINUED | OUTPATIENT
Start: 2023-09-20 | End: 2023-09-20 | Stop reason: SDUPTHER

## 2023-09-20 RX ADMIN — PROPOFOL 100 MG: 10 INJECTION, EMULSION INTRAVENOUS at 16:27

## 2023-09-20 RX ADMIN — Medication 100 MG: at 16:27

## 2023-09-20 RX ADMIN — ONDANSETRON 4 MG: 2 INJECTION INTRAMUSCULAR; INTRAVENOUS at 16:30

## 2023-09-20 RX ADMIN — DEXAMETHASONE SODIUM PHOSPHATE 4 MG: 4 INJECTION, SOLUTION INTRAMUSCULAR; INTRAVENOUS at 16:30

## 2023-09-20 RX ADMIN — SODIUM CHLORIDE: 9 INJECTION, SOLUTION INTRAVENOUS at 13:25

## 2023-09-20 RX ADMIN — MEPERIDINE HYDROCHLORIDE 12.5 MG: 25 INJECTION, SOLUTION INTRAMUSCULAR; INTRAVENOUS; SUBCUTANEOUS at 17:15

## 2023-09-20 RX ADMIN — MEPERIDINE HYDROCHLORIDE 12.5 MG: 25 INJECTION INTRAMUSCULAR; INTRAVENOUS; SUBCUTANEOUS at 17:15

## 2023-09-20 RX ADMIN — CEFAZOLIN 1000 MG: 1 INJECTION, POWDER, FOR SOLUTION INTRAMUSCULAR; INTRAVENOUS at 16:16

## 2023-09-20 ASSESSMENT — PAIN - FUNCTIONAL ASSESSMENT: PAIN_FUNCTIONAL_ASSESSMENT: 0-10

## 2023-09-20 ASSESSMENT — PAIN DESCRIPTION - ORIENTATION: ORIENTATION: LOWER

## 2023-09-20 ASSESSMENT — PAIN DESCRIPTION - LOCATION: LOCATION: ABDOMEN

## 2023-09-20 ASSESSMENT — PAIN DESCRIPTION - DESCRIPTORS
DESCRIPTORS: CRAMPING
DESCRIPTORS: ACHING

## 2023-09-20 ASSESSMENT — ENCOUNTER SYMPTOMS: SHORTNESS OF BREATH: 1

## 2023-09-20 ASSESSMENT — PAIN SCALES - GENERAL
PAINLEVEL_OUTOF10: 5
PAINLEVEL_OUTOF10: 3

## 2023-09-20 NOTE — PROGRESS NOTES
Pt has met discharge criteria and states she is ready for discharge to home. IV removed, gauze and tape applied. Dressed in own clothes and personal belongings gathered. Discharge instructions (with opioid medication education information) given to pt and family; pt and family verbalized understanding of discharge instructions, prescriptions and follow up appointments. Pt transported to discharge lobby by Maryan Ceron [facility] staff.

## 2023-09-20 NOTE — DISCHARGE INSTRUCTIONS
Pt ok to discharge home in good condition  No heavy lifting, >10 lbs for today  Pt should avoid strenuous activity for today  Pt should walk moderately at home  Pt ok to shower   Pt may resume diet as tolerated  Pt should take Rx as directed  No driving while on narcotics  Please call attending physician or hospital  with questions  Call or Present to ED if fever (> 101F), intractable nausea vomiting or pain. Rx in chart    Pt should follow up with Nazia Murry MD, in 4 weeks, call to confirm appointment      Pt should Pull stent in 5 days. There may be some pain associated with the stent removal, which is usually self-limiting. We suggest using the pain medication prescribed for you and a nonsteroidal anti-inflammatory such as Ibuprofen, if you are able to take this medication, to control symptoms. Please stay hydrated. Please call with questions.

## 2023-09-20 NOTE — PROGRESS NOTES
[facility]. Vitals and assessment as charted. 0.9 infusing, @700ml to count from PACU. Pt has sherbert and water. Family at the bedside. Pt and family verbalized understanding of discharge criteria and call light use. Call light in reach.

## 2023-09-20 NOTE — ANESTHESIA PRE PROCEDURE
Applicable):  Lab Results   Component Value Date    LABRH NEG 12/22/2022       Drug/Infectious Status (If Applicable):  Lab Results   Component Value Date/Time    HEPCAB Negative 12/19/2022 04:52 PM       COVID-19 Screening (If Applicable):   Lab Results   Component Value Date/Time    COVID19 NOT DETECTED 04/28/2023 03:05 PM           Anesthesia Evaluation  Patient summary reviewed  Airway: Mallampati: II  TM distance: >3 FB   Neck ROM: full  Mouth opening: > = 3 FB   Dental:          Pulmonary:normal exam    (+) shortness of breath:  sleep apnea:                             Cardiovascular:    (+) hypertension:, CHF:, TUTTLE:,       ECG reviewed      Echocardiogram reviewed                  Neuro/Psych:   (+) CVA:, neuromuscular disease:, headaches:, psychiatric history:            GI/Hepatic/Renal:             Endo/Other:              Pt had no PAT visit       Abdominal:             Vascular: Other Findings:             Anesthesia Plan      general     ASA 3       Induction: intravenous. MIPS: Postoperative opioids intended and Prophylactic antiemetics administered. Anesthetic plan and risks discussed with patient. Plan discussed with CRNA.                     Emeterio George MD   9/20/2023

## 2023-09-20 NOTE — PROGRESS NOTES
Notified Dr Yenifer Ray of pt elevated BP and that pt did not take home blood pressure medications today. No new orders at this time. Recommends to follow up with PCP for blood pressure.

## 2023-09-20 NOTE — PROGRESS NOTES
1656 Awake and oriented on arrival to PACU , HOB elevated , pt shivering , Biar hugger applied , c/o slight lower ABD pain   1715 continues to shiver , medicated with Demerol 12.5 mg IV   1720 shivering gone , denies any needs  1735 pt states pain minimal , shivering gone   Denies any needs  1740 meets criteria for discharge , [facility]

## 2023-09-20 NOTE — BRIEF OP NOTE
Brief Postoperative Note      Patient: Santos Devine  YOB: 1955  MRN: [id number]    Date of Procedure: 9/20/2023    Pre-Op Diagnosis Codes:     * Abdominal pain, unspecified abdominal location [R10.9]     * Hydronephrosis, unspecified hydronephrosis type [N13.30]    Post-Op Diagnosis: Same       Procedure(s):  CYSTOSCOPY, LEFT  URETEROSCOPY, LASER LITHOTRIPSIE OF STONES    Surgeon(s):  Armando Paz MD    Assistant:  * No surgical staff found *    Anesthesia: General    Estimated Blood Loss (mL): Minimal    Complications: None    Specimens:   * No specimens in log *    Implants:  Implant Name Type Inv. Item Serial No.  Lot No. LRB No. Used Action   STENT URET 6FR L26CM HYDR+ PGTL TAPR TIP GRAD BLDR MRK LO - [id number]  STENT URET 6FR L26CM HYDR+ PGTL TAPR TIP GRAD BLDR MRK LO  [employer] [facility] UROLOGY- [id number] N/A 1 Implanted         Drains: * No LDAs found *    Findings: no stone in mid ureter no stricture. There were stones in kidney I lasered (maybe I pushed them up w my stent?) . Pull stent five days. Zana 4-8 weeks w renal u/s prior.        Electronically signed by Duyen Connell MD on 9/20/2023 at 4:57 PM

## 2023-09-20 NOTE — H&P
Trenton Carrillo MD  History and Physical    Patient: Gina Pena  MRN: [id number]  YOB: 1955    HISTORY OF PRESENT ILLNESS:     The patient is a 69 y.o. female who presents with left hydronephrosis. Here for procedure. Patient's old records, notes and chart reviewed and summarized above. Trenton Carrillo MD independently reviewed the images and verified the radiology reports from:    No results found. Past Medical History:    Past Medical History:   Diagnosis Date    Allergic rhinitis     Anemia in CKD (chronic kidney disease)     Anxiety     CHF (congestive heart failure) (HCC)     CVA (cerebral infarction)     Depression     Fibromyalgia     Headache(784.0)     Hemiplegia and hemiparesis following cerebral infarction affecting left non-dominant side (HCC)     Hydronephrosis 09/01/2023    Urogenital Implants, calculus of ureter, UTI    Hyperlipidemia     Hypertension     Irritable bowel syndrome     Kidney failure     Chronic Kidney Disease, Renal Dialysis    Unspecified cerebral artery occlusion with cerebral infarction     Unspecified sleep apnea        Past Surgical History:    Past Surgical History:   Procedure Laterality Date    CARPAL TUNNEL RELEASE Right     COLONOSCOPY  2012    [facility]    CT BIOPSY RENAL  12/28/2022    CT BIOPSY RENAL 12/28/2022 [facility] CT SCAN    CYSTOSCOPY Left 8/29/2023    CYSTOSCOPY, LEFT URETERAL STENT PLACEMENT performed by Hayder Marquez MD at [address] [place] [address], DIAGNOSTIC  unsure    EYE SURGERY      lasik    HEMORRHOID SURGERY  unsure    HYSTERECTOMY (CERVIX STATUS UNKNOWN)      age 34    NECK SURGERY  18  years ago    fusion    OVARY REMOVAL Bilateral     age 34    SINUS SURGERY  10 years ago    TUBAL LIGATION         Medications Prior to Admission:    Prior to Admission medications    Medication Sig Start Date End Date Taking?  Authorizing Provider   senna (SENOKOT) 8.6 MG TABS tablet Take 1 tablet by mouth daily 9/15/23   Doroteo Mcburney, MD dizziness    SOB (shortness of breath) on exertion    Lumbosacral radiculopathy    Degeneration of lumbar intervertebral disc    Acute renal failure (HCC)    Metabolic acidosis    Hypokalemia    Hypomagnesemia    Hyperphosphatemia    Hypocalcemia    Hemoptysis    ERUM (acute kidney injury) ([address])    Nosebleed    Acute on chronic anemia    Hypernatremia    Abdominal pain    Nausea and vomiting    Upper respiratory tract infection    Chronic diastolic CHF (congestive heart failure) (HCC)    Depression    Generalized weakness    ATN (acute tubular necrosis) (HCC)    Thrombocytopenia (HCC)    Hypercalcemia    ARF (acute renal failure) with tubular necrosis (HCC)    Moderate malnutrition (HCC)    ESRD on hemodialysis (HCC)    Hyperkalemia    Physical deconditioning    Hydronephrosis of left kidney    Ureterolithiasis    Urinary tract infection without hematuria    Anemia, chronic disease    Chronic constipation       Plan:     Consent obtained; left ureteroscopy in OR today    Marissa Nance MD  2:18 PM 9/20/2023

## 2023-09-20 NOTE — PROGRESS NOTES
Patient oriented to Same Day department and admitted to Same Day Surgery room 14. Patient verbalized approval for first name, last initial with physician name on unit whiteboard. Plan of care reviewed with patient. Patient room whiteboard filled out and discussed with patient and responsible adult. Patient and responsible adult offered Same Day Welcome Packet to review. Call light in reach. Bed in lowest position, locked, with one bed rail up. SCDs and warming blanket in place. Appropriate arm bands on patient. Bathroom offered. All questions and concerns of patient addressed. Meds to Beds:   Patient informed of [facility]'s Meds to [facility] program during admission.  Patient is agreeable to program.   Contact information for the pharmacy and the Meds to Beds program:   Name: Valentin Moreland   Relationship to patient:spouse/significant other   Phone number: [phone]

## 2023-09-21 DIAGNOSIS — N13.30 HYDRONEPHROSIS OF LEFT KIDNEY: ICD-10-CM

## 2023-09-21 DIAGNOSIS — N20.1 URETEROLITHIASIS: Primary | ICD-10-CM

## 2023-09-21 NOTE — ANESTHESIA POSTPROCEDURE EVALUATION
Department of Anesthesiology  Postprocedure Note    Patient: Lenora Ramirez  MRN: [id number]  YOB: 1955  Date of evaluation: 9/21/2023      Procedure Summary     Date: 09/20/23 Room / Location: [facility] / Tennillea Cogan OR    Anesthesia Start: 4236 Anesthesia Stop: 3116    Procedure: CYSTOSCOPY, LEFT  URETEROSCOPY, LASER LITHOTRIPSIE OF STONES (Ureter) Diagnosis:       Abdominal pain, unspecified abdominal location      Hydronephrosis, unspecified hydronephrosis type      (Abdominal pain, unspecified abdominal location [R10.9])      (Hydronephrosis, unspecified hydronephrosis type [N13.30])    Surgeons: Rohit Raya MD Responsible Provider: Bailee Cunningham MD    Anesthesia Type: general ASA Status: 3          Anesthesia Type: No value filed.     Cosmo Phase I: Cosmo Score: 10    Cosmo Phase II: Cosmo Score: 10      Anesthesia Post Evaluation    Patient location during evaluation: PACU  Patient participation: complete - patient participated  Level of consciousness: awake and alert  Airway patency: patent  Nausea & Vomiting: no nausea  Complications: no  Cardiovascular status: blood pressure returned to baseline and hemodynamically stable  Respiratory status: acceptable and spontaneous ventilation  Hydration status: euvolemic  Pain management: adequate

## 2023-09-24 NOTE — OP NOTE
[address] [facility]    DATE: 9/20/2023  Patient: Cheryl Cunha  MRN: [id number]  YOB: 1955    SURGEON: Chula Stone MD.    ASSISTANT: none    PREOPERATIVE DIAGNOSIS: left ureteral stone      POSTOPERATIVE DIAGNOSIS:  left ureteral stone      PROCEDURE PERFORMED: cystoscopy, left ureteroscopy left holmium laser lithotripsy  left stent exchange    ANESTHESIA: General    COMPLICATIONS: none    OR BLOOD LOSS:  Minimal    FLUIDS: Cystalloids per Anesthesia    SPECIMENS:  * No specimens in log *  none    DRAINS: 6 x 26 dbl j stent    INDICATIONS FOR PROCEDURE:  The patient is a 69 y.o. female who presents today with Abdominal pain, unspecified abdominal location [R10.9]  Hydronephrosis, unspecified hydronephrosis type [N13.30] here for CYSTOSCOPY, LEFT  URETEROSCOPY, LASER LITHOTRIPSIE OF STONES. After risks, benefits and alternatives of the procedure were discussed with the patient, the patient elected to proceed. DETAILS OF PROCEDURE:  After informed consent was obtained in the preoperative area, the patient was taken back to the operating room and transferred to the operating table in supine position. Anesthesia was induced and antibiotics were given. The patient was placed in modified dorsal lithotomy position and sterilely prepped and draped in a standard fashion. A timeout occurred. Two patient identifiers were used. We entered the urethra with a 22 [place] scope. We focused our attention on the left ureteral orifice. The stent was seen emanating from the ureteral orifice. It was grasped using a foreign body grasper and brought to the ureteral meatus. A wire was placed through the stent into the kidney under fluoroscopic guidance. The stent was removed, and a dual lumen catheter was used to place a second wire into the kidney under fluoroscopic guidance. .    The flexible ureteroscope was assembled, place over one Glidewire, and

## 2023-09-26 RX ORDER — OXYBUTYNIN CHLORIDE 10 MG/1
10 TABLET, EXTENDED RELEASE ORAL DAILY
Qty: 7 TABLET | Refills: 0 | OUTPATIENT
Start: 2023-09-26 | End: 2023-10-03

## 2023-09-26 NOTE — TELEPHONE ENCOUNTER
Mevelyn Kanner called requesting a refill on the following medications:  Requested Prescriptions     Pending Prescriptions Disp Refills    oxybutynin (DITROPAN-XL) 10 MG extended release tablet [Pharmacy Med Name: OXYBUTYNIN ER 10MG TABLETS] 7 tablet 0     Sig: TAKE 1 TABLET BY MOUTH DAILY FOR 7 DAYS FOR [facility] verified:  .pv      Date of last visit: surgery 09/20/2023  Date of next visit (if applicable): 97/5/5388

## 2023-09-26 NOTE — TELEPHONE ENCOUNTER
This appears to have been prescribed for stent pain. Her stent was to be removed 5 days following the surgery.

## 2023-10-02 RX ORDER — POLYETHYLENE GLYCOL 3350, SODIUM SULFATE ANHYDROUS, SODIUM BICARBONATE, SODIUM CHLORIDE, POTASSIUM CHLORIDE 236; 22.74; 6.74; 5.86; 2.97 G/4L; G/4L; G/4L; G/4L; G/4L
POWDER, FOR SOLUTION ORAL
Qty: 2000 ML | Refills: 0
Start: 2023-10-02

## 2023-10-11 ENCOUNTER — TELEPHONE (OUTPATIENT)
Dept: FAMILY MEDICINE CLINIC | Age: 68
End: 2023-10-11

## 2023-10-11 NOTE — TELEPHONE ENCOUNTER
Pt was calling asking if she had a reaction, she doesn't remember   Pt was not seeing dr Markus Clarke at that time, and saw Dr. Aicha Proctor   Pt was going to call that office

## 2023-10-11 NOTE — TELEPHONE ENCOUNTER
----- Message from Faith Cerrato sent at 10/11/2023  1:19 PM EDT -----  Subject: Message to Provider    QUESTIONS  Information for Provider? Patient called and has a question on the flu   shot. Went to dialysis and they don't want to give her flu shot. She had a   reaction to it before she needs a call back from us on this.   ---------------------------------------------------------------------------  --------------  Chris [place] Dave  [phone]; OK to leave message on voicemail  ---------------------------------------------------------------------------  --------------  SCRIPT ANSWERS  Relationship to Patient?  Self

## 2023-10-27 ENCOUNTER — CLINICAL DOCUMENTATION ONLY (OUTPATIENT)
Facility: CLINIC | Age: 68
End: 2023-10-27

## 2023-10-30 ENCOUNTER — OFFICE VISIT (OUTPATIENT)
Dept: CARDIOLOGY CLINIC | Age: 68
End: 2023-10-30
Payer: MEDICARE

## 2023-10-30 ENCOUNTER — HOSPITAL ENCOUNTER (OUTPATIENT)
Dept: ULTRASOUND IMAGING | Age: 68
Discharge: HOME | End: 2023-10-30
Attending: UROLOGY
Payer: MEDICARE

## 2023-10-30 VITALS
HEART RATE: 98 BPM | DIASTOLIC BLOOD PRESSURE: 73 MMHG | SYSTOLIC BLOOD PRESSURE: 128 MMHG | HEIGHT: 60 IN | BODY MASS INDEX: 18.36 KG/M2

## 2023-10-30 DIAGNOSIS — N13.30 HYDRONEPHROSIS OF LEFT KIDNEY: ICD-10-CM

## 2023-10-30 DIAGNOSIS — Z86.73 HISTORY OF CVA (CEREBROVASCULAR ACCIDENT): ICD-10-CM

## 2023-10-30 DIAGNOSIS — R42 POSTURAL DIZZINESS: ICD-10-CM

## 2023-10-30 DIAGNOSIS — R53.81 PHYSICAL DECONDITIONING: ICD-10-CM

## 2023-10-30 DIAGNOSIS — N20.1 URETEROLITHIASIS: ICD-10-CM

## 2023-10-30 DIAGNOSIS — I10 ESSENTIAL HYPERTENSION: Primary | Chronic | ICD-10-CM

## 2023-10-30 DIAGNOSIS — R94.31 ABNORMAL EKG: ICD-10-CM

## 2023-10-30 DIAGNOSIS — E78.00 PURE HYPERCHOLESTEROLEMIA: ICD-10-CM

## 2023-10-30 PROBLEM — I50.32 CHRONIC DIASTOLIC CHF (CONGESTIVE HEART FAILURE) (HCC): Status: RESOLVED | Noted: 2022-12-21 | Resolved: 2023-10-30

## 2023-10-30 PROCEDURE — G8484 FLU IMMUNIZE NO ADMIN: HCPCS | Performed by: INTERNAL MEDICINE

## 2023-10-30 PROCEDURE — G8427 DOCREV CUR MEDS BY ELIG CLIN: HCPCS | Performed by: INTERNAL MEDICINE

## 2023-10-30 PROCEDURE — 76770 US EXAM ABDO BACK WALL COMP: CPT

## 2023-10-30 PROCEDURE — 1090F PRES/ABSN URINE INCON ASSESS: CPT | Performed by: INTERNAL MEDICINE

## 2023-10-30 PROCEDURE — 3017F COLORECTAL CA SCREEN DOC REV: CPT | Performed by: INTERNAL MEDICINE

## 2023-10-30 PROCEDURE — 99214 OFFICE O/P EST MOD 30 MIN: CPT | Performed by: INTERNAL MEDICINE

## 2023-10-30 PROCEDURE — 3078F DIAST BP <80 MM HG: CPT | Performed by: INTERNAL MEDICINE

## 2023-10-30 PROCEDURE — G8419 CALC BMI OUT NRM PARAM NOF/U: HCPCS | Performed by: INTERNAL MEDICINE

## 2023-10-30 PROCEDURE — 1123F ACP DISCUSS/DSCN MKR DOCD: CPT | Performed by: INTERNAL MEDICINE

## 2023-10-30 PROCEDURE — 1036F TOBACCO NON-USER: CPT | Performed by: INTERNAL MEDICINE

## 2023-10-30 PROCEDURE — 3074F SYST BP LT 130 MM HG: CPT | Performed by: INTERNAL MEDICINE

## 2023-10-30 PROCEDURE — G8399 PT W/DXA RESULTS DOCUMENT: HCPCS | Performed by: INTERNAL MEDICINE

## 2023-10-30 NOTE — PROGRESS NOTES
2.150 09/15/2023 11:18 AM     Normal sinus rhythm  Incomplete right bundle branch block  Borderline ECG  When compared with ECG of 27-MAR-2019 19:51,  Premature atrial complexes are no longer Present  Incomplete right bundle branch block is now Present  Confirmed by Marina Najera MD, Monica Boyd ([id number]) on 9/3/2020 10:52:42 PM       Conclusions    Summary   Normal left ventricle size and systolic function. Ejection fraction was   estimated at 65 %. There were no regional left ventricular wall motion   abnormalities and wall thickness was within normal limits. Doppler parameters were consistent with abnormal left ventricular   relaxation (grade 1 diastolic dysfunction). The left atrium is Mildly dilated. Signature   ----------------------------------------------------------------   Electronically signed by Ty Mauro MD (Interpreting   physician) on 09/14/2020 at 06:49 PM   ----------------------------------------------------------------      Conclusions      Summary   Normal left ventricle size and systolic function. Ejection fraction was   estimated at 55 to 60 %. There were no regional left ventricular wall   motion abnormalities and wall thickness was within normal limits. Doppler parameters were consistent with abnormal left ventricular   relaxation (grade 1 diastolic dysfunction). The left atrium is Moderately dilated. Signature      ----------------------------------------------------------------   Electronically signed by Ty Mauro MD (Interpreting   physician) on 02/23/2022 at 05:18 PM   ----------------------------------------------------------------     Conclusions    Summary   Lexiscan EKG stress test is not suggestive for ischemia. The nuclear images is not suggestive for myocardial ischemia. Recommendation   Clinical correlation is recommended due to poor image quality.       Signatures    ----------------------------------------------------------------   Electronically signed by Marina Najera,

## 2023-11-02 ENCOUNTER — TELEPHONE (OUTPATIENT)
Dept: FAMILY MEDICINE CLINIC | Age: 68
End: 2023-11-02

## 2023-11-02 NOTE — TELEPHONE ENCOUNTER
Pt is on schedule for nursing home follow up [facility] and left ms for director of health [phone] to fax over record during her stay

## 2023-11-03 NOTE — PROGRESS NOTES
[facility] PSYCHOLOGY  [address]  [address] [place] [address]  [address]  Dept: [phone]  Dept Fax: [phone]: [phone]    Patient: Ibrahima Payne  Visit Date: 5/13/2019  Referring Provider:   No ref. provider found    SUBJECTIVE DATA     CHIEF COMPLAINT:    Chief Complaint   Patient presents with    Anxiety    Depression    Cerebrovascular Accident    Chronic Pain       Patient update:  Patient arrived using a walker. She says she is doing well in PT, but having neck pain, and plans to have an injection on the right side of her neck. Doing her neck exercises, as indicated. [place]-setting for today:  · Heightened depression  · Anxiety issues  · Worrying about many medical issues, including osteoporosis  · Low frustration tolerance    Reports \"I've been more depressed. \" Has been trying to get things done for family, and is super frustrated about being taken advantage of by some [employer]. Had been so upset that she wound up having to ask her  to deal with the representative. Talked to Dr. Sofia Bui and his CNP, Adina Mejia, and they recommended possible Depakote. We discussed her concerns about that, particularly the increased fall risk, related to her osteoporosis, and how overwhelmed she is feeling about all the medical problems (kidneys, osteoporosis, pain, post-CVA). .. Does really like the osteoporosis clinic staff, and feels she's getting good therapy. Also feeling very concerned about a friend, Arturo Raya, who had surgery and lots of complications, and Kary Owen is helping her to deal with it. Also sad about Mother's Day yesterday, and the loss of her mom. We had a lengthy conversation about how to handle her anxiety. I believe her anxiety is natural and proportional to the situations she is experiencing.  She has a variety of coping strategies that she hasn't been using, and she agreed to resume those strategies (phototherapy, behavioral activation, going to depression support group, and hypnosis). OBJECTIVE DATA     Physical Exam:    Mental Status Evaluation:   Orientation: Alert, oriented. Mood:. Anxious, somewhat dysphoric      Affect:  Sad, anxious. Appearance:  Normal   Activity:  Normal   Gait/Posture: Slow, small shuffling steps, using a walker   Speech:  Normal, talkative   Thought Process:  within normal limits   Thought Content: Within Normal Limits   Cognition:  Normal   Memory: Normal   Insight:  good   Judgment: fair and Normal   Suicidal Ideations: Denies Suicidal Ideations   Homicidal Ideations: Denies Homicidal Ideations   Medication Side Effects: absent       Attention Span Normal     Screenings Completed in This Encounter:                                      DIAGNOSIS AND ASSESSMENT DATA     DIAGNOSIS:  History of depression and anxiety, old CVA    PLAN   Follow-up:  No follow-ups on file. Homework assignment before next session:     [] Practice deep breathing 1-2 times per day for 15 minutes, as demonstrated in session, and/or:    [] Go to [employer]\" web site and begin practicing with their free meditation podcasts    [x] Track thoughts that you think feed anxiety or depression    [] Go to [employer]\" web site, open up the \"Workbooks\" tab, and select a problem from the list that best suits your needs. Review the first module. [x] Begin to track physical activity. Even five minutes per day will be helpful.    [] Talk with your physician about whether it's OK to start fish oil (burpless) or flax oil, 1000 to 1200 mg per day    [] Most importantly, remember this guideline: \"Don't believe everything you think! \"   :)    [] Try \"Expressive Writing\" using the guidelines on the handout    [x] Start yoga class, as discussed. 1. Re-start at [facility], when feasible for you. Let the trainers tailor a program to your needs.   2. Continue enjoying time with friends on a regular basis. 3. Assert yourself when it comes to [facility] and the finances. 4. Practice daily relaxation training, again. 5. Start journaling  6. Re-start yoga for multiple physical issues. 7. Continue to focus on improving quality of life, for now  8. Keep your phone with you at all times, at least for the next few weeks. 9. Do the therapies in order, and be OK with taking them one at a time. 10. Recognize your strengths, which include facilitating connections between other people. 11. Re-start bright light therapy  12. Re-start depression support group attendance     Session lasted 46 minutes.     Provider Signature:  Electronically signed by Tonia Ochoa, PhD on 5/13/2019 at 10:06 AM Bexarotene Counseling:  I discussed with the patient the risks of bexarotene including but not limited to hair loss, dry lips/skin/eyes, liver abnormalities, hyperlipidemia, pancreatitis, depression/suicidal ideation, photosensitivity, drug rash/allergic reactions, hypothyroidism, anemia, leukopenia, infection, cataracts, and teratogenicity.  Patient understands that they will need regular blood tests to check lipid profile, liver function tests, white blood cell count, thyroid function tests and pregnancy test if applicable.

## 2023-11-07 ASSESSMENT — ENCOUNTER SYMPTOMS
VOMITING: 0
NAUSEA: 0

## 2023-11-07 NOTE — PROGRESS NOTES
[address] [address] [address] [facility]  Phone:  [phone]          Name: María Elena Edmonds  : 1955    Chief Complaint   Patient presents with    Follow-up     Nursing home discharge   Discuss renal ultrasound        HPI:     María Elena Edmonds is a 77 y.o. female who presents today for follow up of ESRD, hydronephrosis, anxiety, depression, and weakness. She was admitted for rehab to [facility] in [facility] from 10/2/23 to 10/25/23 after a bout with hydronephrosis. She is still doing therapy there twice a week and she feels it's helpful. She had been falling frequently but hasn't in the past month. Getting a lift chair helped, and she's ambulating with a walker. She saw her cardiologist Dr. Kyle Roque on 10/30/23 where she reported occasional dizziness. Orthostatic vital signs were unremarkable so she was encouraged to increase her hydration. She did have a renal US on 10/30/23 per Dr. Mc Lee which showed a probable right renal cyst and left-sided nephrolithiasis. She denies flank pain, dysuria, hematuria. She received her influenza vaccine at the kidney center. Current Outpatient Medications:     polyethylene glycol (GOLYTELY) 236 g solution, Take 2000 ml for 1 dose., Disp: 2000 mL, Rfl: 0    senna (SENOKOT) 8.6 MG TABS tablet, Take 1 tablet by mouth daily, Disp: 120 tablet, Rfl: 0    Azelastine HCl (ASTEPRO NA), by Nasal route, Disp: , Rfl:     polyethylene glycol (GLYCOLAX) 17 g packet, Take 1 packet by mouth daily as needed for Constipation, Disp: , Rfl:     buPROPion (WELLBUTRIN XL) 300 MG extended release tablet, Take with 150 mg for 450 mg daily. , Disp: 90 tablet, Rfl: 1    buPROPion (WELLBUTRIN XL) 150 MG extended release tablet, Take with 300 mg daily for 450 mg daily. , Disp: 90 tablet, Rfl: 1    clopidogrel (PLAVIX) 75 MG tablet, Take 1 tablet by mouth daily, Disp: 90 tablet, Rfl: 1    polycarbophil (FIBERCON) 625 MG tablet, Take 1 tablet by mouth daily, Disp: , Rfl:

## 2023-11-09 ENCOUNTER — OFFICE VISIT (OUTPATIENT)
Dept: FAMILY MEDICINE CLINIC | Age: 68
End: 2023-11-09
Payer: MEDICARE

## 2023-11-09 VITALS — SYSTOLIC BLOOD PRESSURE: 100 MMHG | TEMPERATURE: 98.8 F | DIASTOLIC BLOOD PRESSURE: 70 MMHG | HEART RATE: 101 BPM

## 2023-11-09 DIAGNOSIS — N20.0 KIDNEY STONE ON LEFT SIDE: ICD-10-CM

## 2023-11-09 DIAGNOSIS — N13.30 HYDRONEPHROSIS OF LEFT KIDNEY: Primary | ICD-10-CM

## 2023-11-09 DIAGNOSIS — R53.1 GENERALIZED WEAKNESS: ICD-10-CM

## 2023-11-09 DIAGNOSIS — F33.42 RECURRENT MAJOR DEPRESSIVE DISORDER, IN FULL REMISSION (HCC): ICD-10-CM

## 2023-11-09 PROCEDURE — 1123F ACP DISCUSS/DSCN MKR DOCD: CPT | Performed by: FAMILY MEDICINE

## 2023-11-09 PROCEDURE — 99214 OFFICE O/P EST MOD 30 MIN: CPT | Performed by: FAMILY MEDICINE

## 2023-11-09 PROCEDURE — G8427 DOCREV CUR MEDS BY ELIG CLIN: HCPCS | Performed by: FAMILY MEDICINE

## 2023-11-09 PROCEDURE — 3017F COLORECTAL CA SCREEN DOC REV: CPT | Performed by: FAMILY MEDICINE

## 2023-11-09 PROCEDURE — 3078F DIAST BP <80 MM HG: CPT | Performed by: FAMILY MEDICINE

## 2023-11-09 PROCEDURE — G8419 CALC BMI OUT NRM PARAM NOF/U: HCPCS | Performed by: FAMILY MEDICINE

## 2023-11-09 PROCEDURE — 1090F PRES/ABSN URINE INCON ASSESS: CPT | Performed by: FAMILY MEDICINE

## 2023-11-09 PROCEDURE — G8399 PT W/DXA RESULTS DOCUMENT: HCPCS | Performed by: FAMILY MEDICINE

## 2023-11-09 PROCEDURE — 3074F SYST BP LT 130 MM HG: CPT | Performed by: FAMILY MEDICINE

## 2023-11-09 PROCEDURE — G8484 FLU IMMUNIZE NO ADMIN: HCPCS | Performed by: FAMILY MEDICINE

## 2023-11-09 PROCEDURE — 1036F TOBACCO NON-USER: CPT | Performed by: FAMILY MEDICINE

## 2023-11-09 ASSESSMENT — ENCOUNTER SYMPTOMS
CONSTIPATION: 0
DIARRHEA: 0

## 2023-11-13 ENCOUNTER — OFFICE VISIT (OUTPATIENT)
Dept: UROLOGY | Age: 68
End: 2023-11-13
Payer: MEDICARE

## 2023-11-13 VITALS
BODY MASS INDEX: 18.46 KG/M2 | WEIGHT: 94 LBS | SYSTOLIC BLOOD PRESSURE: 156 MMHG | DIASTOLIC BLOOD PRESSURE: 94 MMHG | HEIGHT: 60 IN

## 2023-11-13 DIAGNOSIS — N20.0 KIDNEY STONE: Primary | ICD-10-CM

## 2023-11-13 DIAGNOSIS — R31.29 MICROHEMATURIA: ICD-10-CM

## 2023-11-13 LAB
BILIRUBIN URINE: NEGATIVE
BLOOD URINE, POC: ABNORMAL
CHARACTER, URINE: ABNORMAL
COLOR, URINE: YELLOW
GLUCOSE URINE: 100 MG/DL
KETONES, URINE: NEGATIVE
LEUKOCYTE CLUMPS, URINE: ABNORMAL
NITRITE, URINE: NEGATIVE
PH, URINE: 5.5 (ref 5–9)
PROTEIN, URINE: 100 MG/DL
SPECIFIC GRAVITY, URINE: 1.02 (ref 1–1.03)
UROBILINOGEN, URINE: 0.2 EU/DL (ref 0–1)

## 2023-11-13 PROCEDURE — 3080F DIAST BP >= 90 MM HG: CPT | Performed by: NURSE PRACTITIONER

## 2023-11-13 PROCEDURE — G8484 FLU IMMUNIZE NO ADMIN: HCPCS | Performed by: NURSE PRACTITIONER

## 2023-11-13 PROCEDURE — 1036F TOBACCO NON-USER: CPT | Performed by: NURSE PRACTITIONER

## 2023-11-13 PROCEDURE — 1123F ACP DISCUSS/DSCN MKR DOCD: CPT | Performed by: NURSE PRACTITIONER

## 2023-11-13 PROCEDURE — G8427 DOCREV CUR MEDS BY ELIG CLIN: HCPCS | Performed by: NURSE PRACTITIONER

## 2023-11-13 PROCEDURE — 81003 URINALYSIS AUTO W/O SCOPE: CPT | Performed by: NURSE PRACTITIONER

## 2023-11-13 PROCEDURE — 1090F PRES/ABSN URINE INCON ASSESS: CPT | Performed by: NURSE PRACTITIONER

## 2023-11-13 PROCEDURE — 3077F SYST BP >= 140 MM HG: CPT | Performed by: NURSE PRACTITIONER

## 2023-11-13 PROCEDURE — G8419 CALC BMI OUT NRM PARAM NOF/U: HCPCS | Performed by: NURSE PRACTITIONER

## 2023-11-13 PROCEDURE — G8399 PT W/DXA RESULTS DOCUMENT: HCPCS | Performed by: NURSE PRACTITIONER

## 2023-11-13 PROCEDURE — 3017F COLORECTAL CA SCREEN DOC REV: CPT | Performed by: NURSE PRACTITIONER

## 2023-11-13 PROCEDURE — 99213 OFFICE O/P EST LOW 20 MIN: CPT | Performed by: NURSE PRACTITIONER

## 2023-11-14 ENCOUNTER — OFFICE VISIT (OUTPATIENT)
Dept: CARDIOTHORACIC SURGERY | Age: 68
End: 2023-11-14

## 2023-11-14 VITALS
HEIGHT: 60 IN | WEIGHT: 95 LBS | BODY MASS INDEX: 18.65 KG/M2 | SYSTOLIC BLOOD PRESSURE: 126 MMHG | DIASTOLIC BLOOD PRESSURE: 62 MMHG | HEART RATE: 98 BPM

## 2023-11-14 DIAGNOSIS — N18.6 CKD (CHRONIC KIDNEY DISEASE) STAGE V REQUIRING CHRONIC DIALYSIS (HCC): Primary | ICD-10-CM

## 2023-11-14 DIAGNOSIS — Z99.2 CKD (CHRONIC KIDNEY DISEASE) STAGE V REQUIRING CHRONIC DIALYSIS (HCC): Primary | ICD-10-CM

## 2023-11-14 LAB
BACTERIA UR CULT: ABNORMAL
ORGANISM: ABNORMAL

## 2023-11-14 NOTE — PROGRESS NOTES
CT/CV Surgery Follow Up Office Visit      Patient's Name/Date of Birth: Hayley Almendarez / 1955 (77 y.o.)    PCP: Denise Fernandez MD    Date: November 14, 2023    We had the pleasure of seeing Hayley Almendarez in the office today, as you know this is a very pleasant 77y.o. year old female with a history of hypertension, hyperlipidemia, CVA with left-sided weakness, and chronic kidney disease stage V requiring hemodialysis via right IJ tunneled catheter 3 times per week. She returned to the cardiovascular surgery clinic for follow-up. She reports that she has been exercising her left arm to develop larger veins. She was examined with ultrasound machine at the time of the office visit. It showed very small veins in the left forearm and upper arm. The axillary vein close to the left shoulder appeared to be bigger than 3 mm in diameter. She denies any recent episode of midsternal chest pain, indigestion-like symptoms, or shortness of breath at rest.  She is a right handed person. PastMedical History: Flaquito Husbands  has a past medical history of Allergic rhinitis, Anemia in CKD (chronic kidney disease), Anxiety, CHF (congestive heart failure) ([address]), CVA (cerebral infarction), Depression, Fibromyalgia, Headache(784.0), Hemiplegia and hemiparesis following cerebral infarction affecting left non-dominant side ([address]), Hydronephrosis, Hyperlipidemia, Hypertension, Irritable bowel syndrome, Kidney failure, Unspecified cerebral artery occlusion with cerebral infarction, and Unspecified sleep apnea. Past Surgical History:  The patient  has a past surgical history that includes sinus surgery (10 years ago); Hemorrhoid surgery (unsure); Neck surgery (18  years ago); eye surgery; Endoscopy, colon, diagnostic (unsure); Colonoscopy (2012); Tubal ligation; Carpal tunnel release (Right); Hysterectomy; Ovary removal (Bilateral); CT BIOPSY RENAL (12/28/2022);  Cystoscopy (Left, 8/29/2023); and Ureter surgery (N/A,

## 2023-11-14 NOTE — PATIENT INSTRUCTIONS
Manuel Yung will call you to schedule surgery. Please contact us with any questions or concerns -- [phone]. If you receive a survey asking about your care experience, please respond. Your answers will help ensure you receive high-quality care at this office. Thank you! Your Medical Assistant today: Ellie Catalan. Thank you for coming to our office! It was a pleasure to serve you.

## 2023-11-15 ENCOUNTER — PREP FOR PROCEDURE (OUTPATIENT)
Dept: CARDIOTHORACIC SURGERY | Age: 68
End: 2023-11-15

## 2023-11-15 ENCOUNTER — TELEPHONE (OUTPATIENT)
Dept: CARDIOTHORACIC SURGERY | Age: 68
End: 2023-11-15

## 2023-11-15 DIAGNOSIS — Z99.2 CKD (CHRONIC KIDNEY DISEASE) STAGE V REQUIRING CHRONIC DIALYSIS (HCC): Primary | ICD-10-CM

## 2023-11-15 DIAGNOSIS — N18.6 CKD (CHRONIC KIDNEY DISEASE) STAGE V REQUIRING CHRONIC DIALYSIS (HCC): Primary | ICD-10-CM

## 2023-11-15 RX ORDER — SODIUM CHLORIDE 0.9 % (FLUSH) 0.9 %
5-40 SYRINGE (ML) INJECTION EVERY 12 HOURS SCHEDULED
OUTPATIENT
Start: 2023-11-15

## 2023-11-15 RX ORDER — SODIUM CHLORIDE 9 MG/ML
INJECTION, SOLUTION INTRAVENOUS PRN
OUTPATIENT
Start: 2023-11-15

## 2023-11-15 RX ORDER — SODIUM CHLORIDE 0.9 % (FLUSH) 0.9 %
5-40 SYRINGE (ML) INJECTION PRN
OUTPATIENT
Start: 2023-11-15

## 2023-11-15 RX ORDER — SODIUM CHLORIDE 9 MG/ML
INJECTION, SOLUTION INTRAVENOUS CONTINUOUS
OUTPATIENT
Start: 2023-11-15

## 2023-11-15 NOTE — TELEPHONE ENCOUNTER
Pt is scheduled for LEFT UPPER ARM AV GRAFT with DR Hernandez Score on 1/4/24 at 730AM. Pt agreed to date/time. Surgery instructions are as follows:  - [facility] at 530am  - NPO after midnight the night before surgery  -Hold PLAVIX and ASA for 3 days prior to surgery, starting 1/1/24  - Must have a  the day of surgery     PAT visit scheduled for 12/28/23 at 1030am. Confirmed with Myron Nolen. All instructions discussed with pt's  Christiano Regan (on HIPAA), he verbalized understanding. Denied questions or concerns at this time. Primary insurance is Medicare. No prior authorization is necessary.

## 2023-12-12 RX ORDER — SENNOSIDES 8.6 MG
1 TABLET ORAL DAILY
Qty: 90 TABLET | Refills: 1 | Status: ON HOLD | OUTPATIENT
Start: 2023-12-12

## 2023-12-15 PROBLEM — T14.90XA TRAUMA: Status: ACTIVE | Noted: 2023-12-15

## 2023-12-16 PROBLEM — S32.020A CLOSED COMPRESSION FRACTURE OF L2 LUMBAR VERTEBRA, INITIAL ENCOUNTER (HCC): Status: ACTIVE | Noted: 2023-12-16

## 2023-12-16 PROBLEM — S36.113A LIVER INJURY, LACERATION: Status: ACTIVE | Noted: 2023-12-16

## 2023-12-16 PROBLEM — N25.81 SECONDARY HYPERPARATHYROIDISM OF RENAL ORIGIN (HCC): Status: ACTIVE | Noted: 2023-12-16

## 2023-12-21 ENCOUNTER — HOSPITAL ENCOUNTER (INPATIENT)
Age: 68
LOS: 15 days | Discharge: HOME | End: 2024-01-05
Attending: PHYSICAL MEDICINE & REHABILITATION | Admitting: STUDENT IN AN ORGANIZED HEALTH CARE EDUCATION/TRAINING PROGRAM
Payer: MEDICARE

## 2023-12-21 ENCOUNTER — APPOINTMENT (OUTPATIENT)
Dept: INTERVENTIONAL RADIOLOGY/VASCULAR | Age: 68
End: 2023-12-21
Attending: INTERNAL MEDICINE
Payer: MEDICARE

## 2023-12-21 DIAGNOSIS — S32.020A CLOSED COMPRESSION FRACTURE OF L2 LUMBAR VERTEBRA, INITIAL ENCOUNTER (HCC): Primary | ICD-10-CM

## 2023-12-21 DIAGNOSIS — S32.020A TRAUMATIC COMPRESSION FRACTURE OF L2 LUMBAR VERTEBRA, CLOSED, INITIAL ENCOUNTER (HCC): ICD-10-CM

## 2023-12-21 PROCEDURE — 1180000000 HC REHAB R&B

## 2023-12-21 PROCEDURE — 02H633Z INSERTION OF INFUSION DEVICE INTO RIGHT ATRIUM, PERCUTANEOUS APPROACH: ICD-10-PCS | Performed by: INTERNAL MEDICINE

## 2023-12-21 PROCEDURE — 02PYX3Z REMOVAL OF INFUSION DEVICE FROM GREAT VESSEL, EXTERNAL APPROACH: ICD-10-PCS | Performed by: INTERNAL MEDICINE

## 2023-12-21 PROCEDURE — 2580000003 HC RX 258: Performed by: NURSE PRACTITIONER

## 2023-12-21 PROCEDURE — 2709999900 IR FLUORO GUIDED CVA DEVICE PLMT/REPLACE/REMOVAL

## 2023-12-21 PROCEDURE — 6370000000 HC RX 637 (ALT 250 FOR IP): Performed by: INTERNAL MEDICINE

## 2023-12-21 PROCEDURE — 6370000000 HC RX 637 (ALT 250 FOR IP): Performed by: NURSE PRACTITIONER

## 2023-12-21 PROCEDURE — 6360000004 HC RX CONTRAST MEDICATION: Performed by: RADIOLOGY

## 2023-12-21 PROCEDURE — 36598 INJ W/FLUOR EVAL CV DEVICE: CPT

## 2023-12-21 PROCEDURE — 6360000002 HC RX W HCPCS: Performed by: INTERNAL MEDICINE

## 2023-12-21 PROCEDURE — 99222 1ST HOSP IP/OBS MODERATE 55: CPT | Performed by: PHYSICAL MEDICINE & REHABILITATION

## 2023-12-21 PROCEDURE — 2580000003 HC RX 258: Performed by: INTERNAL MEDICINE

## 2023-12-21 PROCEDURE — 5A1D70Z PERFORMANCE OF URINARY FILTRATION, INTERMITTENT, LESS THAN 6 HOURS PER DAY: ICD-10-PCS | Performed by: PHYSICAL MEDICINE & REHABILITATION

## 2023-12-21 PROCEDURE — 6370000000 HC RX 637 (ALT 250 FOR IP): Performed by: PHYSICAL MEDICINE & REHABILITATION

## 2023-12-21 RX ORDER — CYCLOBENZAPRINE HCL 10 MG
10 TABLET ORAL 3 TIMES DAILY PRN
Status: DISCONTINUED | OUTPATIENT
Start: 2023-12-21 | End: 2024-01-05 | Stop reason: HOSPADM

## 2023-12-21 RX ORDER — SODIUM CHLORIDE 9 MG/ML
INJECTION, SOLUTION INTRAVENOUS PRN
Status: DISCONTINUED | OUTPATIENT
Start: 2023-12-21 | End: 2024-01-05 | Stop reason: HOSPADM

## 2023-12-21 RX ORDER — ACETAMINOPHEN 325 MG/1
650 TABLET ORAL EVERY 4 HOURS PRN
Status: DISCONTINUED | OUTPATIENT
Start: 2023-12-21 | End: 2024-01-05 | Stop reason: HOSPADM

## 2023-12-21 RX ORDER — POTASSIUM CHLORIDE 7.45 MG/ML
10 INJECTION INTRAVENOUS PRN
Status: DISCONTINUED | OUTPATIENT
Start: 2023-12-21 | End: 2023-12-21

## 2023-12-21 RX ORDER — LANOLIN ALCOHOL/MO/W.PET/CERES
6 CREAM (GRAM) TOPICAL NIGHTLY
Status: DISCONTINUED | OUTPATIENT
Start: 2023-12-21 | End: 2024-01-05 | Stop reason: HOSPADM

## 2023-12-21 RX ORDER — CLOPIDOGREL BISULFATE 75 MG/1
75 TABLET ORAL DAILY
Status: DISCONTINUED | OUTPATIENT
Start: 2023-12-22 | End: 2024-01-05 | Stop reason: HOSPADM

## 2023-12-21 RX ORDER — DOCOSANOL 100 MG/G
CREAM TOPICAL
Status: DISPENSED | OUTPATIENT
Start: 2023-12-21 | End: 2023-12-26

## 2023-12-21 RX ORDER — MONTELUKAST SODIUM 10 MG/1
10 TABLET ORAL DAILY
Status: DISCONTINUED | OUTPATIENT
Start: 2023-12-22 | End: 2024-01-05 | Stop reason: HOSPADM

## 2023-12-21 RX ORDER — SEVELAMER CARBONATE 800 MG/1
800 TABLET, FILM COATED ORAL
Status: DISCONTINUED | OUTPATIENT
Start: 2023-12-21 | End: 2024-01-05 | Stop reason: HOSPADM

## 2023-12-21 RX ORDER — MAGNESIUM SULFATE IN WATER 40 MG/ML
2000 INJECTION, SOLUTION INTRAVENOUS PRN
Status: DISCONTINUED | OUTPATIENT
Start: 2023-12-21 | End: 2023-12-21

## 2023-12-21 RX ORDER — AMLODIPINE BESYLATE 5 MG/1
5 TABLET ORAL DAILY
Status: DISCONTINUED | OUTPATIENT
Start: 2023-12-22 | End: 2024-01-05 | Stop reason: HOSPADM

## 2023-12-21 RX ORDER — CETIRIZINE HYDROCHLORIDE 5 MG/1
5 TABLET ORAL DAILY
Status: DISCONTINUED | OUTPATIENT
Start: 2023-12-22 | End: 2024-01-05 | Stop reason: HOSPADM

## 2023-12-21 RX ORDER — LACTULOSE 10 G/15ML
20 SOLUTION ORAL 3 TIMES DAILY
Status: DISCONTINUED | OUTPATIENT
Start: 2023-12-21 | End: 2023-12-21

## 2023-12-21 RX ORDER — DOCUSATE SODIUM 100 MG/1
100 CAPSULE, LIQUID FILLED ORAL 2 TIMES DAILY
Status: DISCONTINUED | OUTPATIENT
Start: 2023-12-21 | End: 2024-01-05 | Stop reason: HOSPADM

## 2023-12-21 RX ORDER — FLUTICASONE PROPIONATE 50 MCG
2 SPRAY, SUSPENSION (ML) NASAL DAILY
Status: DISCONTINUED | OUTPATIENT
Start: 2023-12-22 | End: 2024-01-05 | Stop reason: HOSPADM

## 2023-12-21 RX ORDER — WATER 10 ML/10ML
4 INJECTION INTRAMUSCULAR; INTRAVENOUS; SUBCUTANEOUS
Status: COMPLETED | OUTPATIENT
Start: 2023-12-21 | End: 2023-12-21

## 2023-12-21 RX ORDER — TRAZODONE HYDROCHLORIDE 50 MG/1
50 TABLET ORAL NIGHTLY PRN
Status: DISCONTINUED | OUTPATIENT
Start: 2023-12-21 | End: 2024-01-05 | Stop reason: HOSPADM

## 2023-12-21 RX ORDER — BISACODYL 10 MG
10 SUPPOSITORY, RECTAL RECTAL DAILY PRN
Status: DISCONTINUED | OUTPATIENT
Start: 2023-12-21 | End: 2024-01-05 | Stop reason: HOSPADM

## 2023-12-21 RX ORDER — POTASSIUM CHLORIDE 29.8 MG/ML
20 INJECTION INTRAVENOUS PRN
Status: DISCONTINUED | OUTPATIENT
Start: 2023-12-21 | End: 2023-12-21

## 2023-12-21 RX ORDER — ATORVASTATIN CALCIUM 10 MG/1
10 TABLET, FILM COATED ORAL NIGHTLY
Status: DISCONTINUED | OUTPATIENT
Start: 2023-12-21 | End: 2024-01-05 | Stop reason: HOSPADM

## 2023-12-21 RX ORDER — SENNOSIDES A AND B 8.6 MG/1
2 TABLET, FILM COATED ORAL NIGHTLY
Status: DISCONTINUED | OUTPATIENT
Start: 2023-12-21 | End: 2024-01-05 | Stop reason: HOSPADM

## 2023-12-21 RX ORDER — FAMOTIDINE 20 MG/1
10 TABLET, FILM COATED ORAL DAILY
Status: DISCONTINUED | OUTPATIENT
Start: 2023-12-22 | End: 2024-01-05 | Stop reason: HOSPADM

## 2023-12-21 RX ORDER — SODIUM CHLORIDE 0.9 % (FLUSH) 0.9 %
5-40 SYRINGE (ML) INJECTION PRN
Status: DISCONTINUED | OUTPATIENT
Start: 2023-12-21 | End: 2024-01-05 | Stop reason: HOSPADM

## 2023-12-21 RX ORDER — TRAMADOL HYDROCHLORIDE 50 MG/1
50 TABLET ORAL EVERY 6 HOURS PRN
Status: DISCONTINUED | OUTPATIENT
Start: 2023-12-21 | End: 2024-01-05 | Stop reason: HOSPADM

## 2023-12-21 RX ORDER — POLYETHYLENE GLYCOL 3350 17 G/17G
17 POWDER, FOR SOLUTION ORAL 2 TIMES DAILY
Status: DISCONTINUED | OUTPATIENT
Start: 2023-12-21 | End: 2024-01-05 | Stop reason: HOSPADM

## 2023-12-21 RX ORDER — SODIUM CHLORIDE 0.9 % (FLUSH) 0.9 %
5-40 SYRINGE (ML) INJECTION EVERY 12 HOURS SCHEDULED
Status: DISCONTINUED | OUTPATIENT
Start: 2023-12-21 | End: 2023-12-26

## 2023-12-21 RX ORDER — LIDOCAINE 4 G/G
1 PATCH TOPICAL DAILY
Status: DISCONTINUED | OUTPATIENT
Start: 2023-12-22 | End: 2023-12-27

## 2023-12-21 RX ORDER — ONDANSETRON 4 MG/1
4 TABLET, ORALLY DISINTEGRATING ORAL EVERY 8 HOURS PRN
Status: DISCONTINUED | OUTPATIENT
Start: 2023-12-21 | End: 2024-01-05 | Stop reason: HOSPADM

## 2023-12-21 RX ORDER — FOLIC ACID 1 MG/1
500 TABLET ORAL DAILY
Status: DISCONTINUED | OUTPATIENT
Start: 2023-12-22 | End: 2024-01-05 | Stop reason: HOSPADM

## 2023-12-21 RX ORDER — TRAZODONE HYDROCHLORIDE 50 MG/1
50 TABLET ORAL NIGHTLY
Status: DISCONTINUED | OUTPATIENT
Start: 2023-12-21 | End: 2024-01-05 | Stop reason: HOSPADM

## 2023-12-21 RX ORDER — CALCITRIOL 0.25 UG/1
0.25 CAPSULE, LIQUID FILLED ORAL DAILY
Status: DISCONTINUED | OUTPATIENT
Start: 2023-12-22 | End: 2024-01-05 | Stop reason: HOSPADM

## 2023-12-21 RX ADMIN — ALTEPLASE 2 MG: 2.2 INJECTION, POWDER, LYOPHILIZED, FOR SOLUTION INTRAVENOUS at 13:32

## 2023-12-21 RX ADMIN — DOCOSANOL: 100 CREAM TOPICAL at 23:00

## 2023-12-21 RX ADMIN — IOPAMIDOL 16 ML: 612 INJECTION, SOLUTION INTRAVENOUS at 16:27

## 2023-12-21 RX ADMIN — TRAZODONE HYDROCHLORIDE 50 MG: 50 TABLET ORAL at 23:03

## 2023-12-21 RX ADMIN — ATORVASTATIN CALCIUM 10 MG: 10 TABLET, FILM COATED ORAL at 23:00

## 2023-12-21 RX ADMIN — SODIUM CHLORIDE, PRESERVATIVE FREE 10 ML: 5 INJECTION INTRAVENOUS at 23:05

## 2023-12-21 RX ADMIN — SENNOSIDES 17.2 MG: 8.6 TABLET, FILM COATED ORAL at 23:03

## 2023-12-21 RX ADMIN — WATER 4 ML: 1 INJECTION INTRAMUSCULAR; INTRAVENOUS; SUBCUTANEOUS at 13:32

## 2023-12-21 RX ADMIN — DOCUSATE SODIUM 100 MG: 100 CAPSULE, LIQUID FILLED ORAL at 23:03

## 2023-12-21 RX ADMIN — Medication 6 MG: at 23:03

## 2023-12-21 RX ADMIN — SEVELAMER CARBONATE 800 MG: 800 TABLET, FILM COATED ORAL at 15:41

## 2023-12-21 RX ADMIN — SODIUM ZIRCONIUM CYCLOSILICATE 10 G: 10 POWDER, FOR SUSPENSION ORAL at 15:40

## 2023-12-21 RX ADMIN — SODIUM ZIRCONIUM CYCLOSILICATE 10 G: 10 POWDER, FOR SUSPENSION ORAL at 23:01

## 2023-12-21 RX ADMIN — DOCOSANOL: 100 CREAM TOPICAL at 23:16

## 2023-12-21 RX ADMIN — DOCOSANOL: 100 CREAM TOPICAL at 15:43

## 2023-12-21 ASSESSMENT — PAIN SCALES - GENERAL
PAINLEVEL_OUTOF10: 0
PAINLEVEL_OUTOF10: 5

## 2023-12-21 NOTE — PROGRESS NOTES
Admitted to the Inpatient Rehabilitation Unit via wheelchair.  Patient was then oriented to room and unit.  Education provided on the rehabilitation routine: three hours of therapy five days per week.      Explained patients right to have family, representative or physician notified of their admission.  Patient has Declined for physician to be notified.  Patient has Declined for family/representative to be notified.    Admitting medication orders compared with acute stay medications; home medication list reviewed with patient/family.  Medication issues identified No  Medication issue: none     Transportation:   Has transportation kept you from medical appointments, meetings, work, or from getting things needed for daily living? (Check all that apply)  No.      Health Literacy:   How often do you need to have someone help you when you read instructions, pamphlets, or other written material from your doctor or pharmacy?  0. - Never    Social Isolation:  How often do you feel lonely or isolated from those around you?  0. Never    Patient Mood Interview (PHQ-2 to 9) (from [employer]©)   Say to Patient: \"Over the last 2 weeks, have you been bothered by any of the following problems?\"   If symptom is present, enter yes in column 1 (Symptom Presence)  If yes in column 1, then ask the patient: “About how often have you been bothered by this?” Indicate response in column 2, Symptom Frequency.   Symptom Presence  No    Yes   9.    No response  Symptom Frequency  Never or 1 day  2-6 days (several days)  7-11 days (half or more of the days)  12-14 days (nearly every day)    Symptom Presence Symptom Frequency   Little interest or pleasure in doing things 0. No 0. Never or 1 day   Feeling down, depressed, or hopeless 0. No 0. Never or 1 day   If either A or B above has symptom frequency coded 2 or 3, CONTINUE asking questions below.      If not, END the interview  and right click on next table to delete.

## 2023-12-21 NOTE — FLOWSHEET NOTE
12/21/23 1730   Vital Signs   BP (!) 166/81   Temp 97.1 °F (36.2 °C)   Pulse 93   Respirations 18   Weight - Scale 47 kg (103 lb 9.9 oz)   Weight Method Bed scale   Percent Weight Change 0   Post-Hemodialysis Assessment   Post-Treatment Procedures Blood returned;Catheter Capped, clamped with Saline x2 ports   Machine Disinfection Process Acid/Vinegar Clean;Heat Disinfect;Exterior Machine Disinfection   Rinseback Volume (ml) 400 ml   Blood Volume Processed (Liters) 6.4 L   Dialyzer Clearance Lightly streaked   Duration of Treatment (minutes) 36 minutes   Heparin Amount Administered During Treatment (mL) 0 mL   Hemodialysis Intake (ml) 400 ml   Hemodialysis Output (ml) 77 ml   NET Removed (ml) -323     activase dwell pre treatment unsuccessful unable to pull activase from venous line. IR pulled activase and reports catheter functioning. upon arrival to unit sluggish pull both venous and arterial lines of tessio, attempted to dialyze with bfr down to 200, constant high pressure and alarms despite reversing lines, repositioning patient. unable to complete treatment. 30 minutes hd completed. received order from Dr. Cuadra to end treatment and have IR fix/ replace tessio tomorrow. cath lines flushed with 0.9 ns, clamped and tegos applied. patient sent to , report called to nurse. treatment record printed to be scanned into emr

## 2023-12-21 NOTE — PLAN OF CARE
Problem: Discharge Planning  Goal: Discharge to home or other facility with appropriate resources  Outcome: Progressing  Flowsheets (Taken 12/21/2023 1622)  Discharge to home or other facility with appropriate resources:   Arrange for needed discharge resources and transportation as appropriate   Identify barriers to discharge with patient and caregiver   Identify discharge learning needs (meds, wound care, etc)   Refer to discharge planning if patient needs post-hospital services based on physician order or complex needs related to functional status, cognitive ability or social support system     Problem: Safety - Adult  Goal: Free from fall injury  Outcome: Progressing  Flowsheets (Taken 12/21/2023 1622)  Free From Fall Injury: Instruct family/caregiver on patient safety     Problem: ABCDS Injury Assessment  Goal: Absence of physical injury  Outcome: Progressing  Flowsheets (Taken 12/21/2023 1622)  Absence of Physical Injury: Implement safety measures based on patient assessment     Problem: Pain  Goal: Verbalizes/displays adequate comfort level or baseline comfort level  Outcome: Progressing  Flowsheets (Taken 12/21/2023 1622)  Verbalizes/displays adequate comfort level or baseline comfort level:   Encourage patient to monitor pain and request assistance   Assess pain using appropriate pain scale   Administer analgesics based on type and severity of pain and evaluate response   Implement non-pharmacological measures as appropriate and evaluate response   Notify Licensed Independent Practitioner if interventions unsuccessful or patient reports new pain   Care plan reviewed with patient.  Patient verbalizes understanding of the care plan and contributed to goal setting.

## 2023-12-21 NOTE — H&P
irritable bowel syndrome  Continue Singulair, Flonase for allergic rhinitis  Continue folic acid supplement  Continue Plavix for secondary stroke prevention  Start melatonin, trazodone as needed for insomnia  Nutrition:  Consultation to dietician for nutritional counseling and recommendations.  Prealbumin will be checked on admission.   Bladder: Monitoring signs or symptoms of UTI  Bowel: Monitoring signs or symptoms of constipation   and case management consultations for coordination of care and discharge planning    The main medical problem(s) and comorbidities being actively managed by the physicians and requiring 24 hour rehabilitation nursing care during this stay include hypertension, end-stage renal disease on hemodialysis, irritable bowel syndrome, hyperlipidemia, anemia, osteoporosis history of CVA.      The domains of functional impairment present in this patient which will require an intensive and interdisciplinary rehabilitation environment include self care, mobility, motor dysfunction, bowel/bladder management, pain management, safety, and cognitive function.    Estimated length of stay for this admission : probably 2~3 weeks    Anticipated disposition: Home.  The potential to achieve that is fair.    ==========================================================================================================================      Chief Complaint and Reason for Rehabilitation Admission:   Low back pain, generalized weakness    History of Present Illness:  Deloris Watts is a 68 y.o. right-handed  female with history of hypertension, hyperlipidemia, hydronephrosis, fibromyalgia, irritable bowel syndrome, osteoporosis, end-stage renal disease on hemodialysis since January 2023, stroke with left side weakness in 1990s, anemia, depression, anxiety, right wrist and right ankle fracture treated conservatively, right proximal humeral fracture due to fall requiring ORIF, status post right    (L): Data is abnormally low  (H): Data is abnormally high     Latest Reference Range & Units 12/19/23 07:38 12/20/23 21:17 12/21/23 00:21   Hemoglobin Quant 12.0 - 16.0 gm/dl 13.9 15.3 15.3   Hematocrit 37.0 - 47.0 % 44.6 54.5 (H) 52.1 (H)   (H): Data is abnormally high    DEXA bone density study (1/17/2019) :  IMPRESSION:   OSTEOPOROSIS  Patient is at high risk for fracture.  The patient meets criteria   for a bone fragility clinic.  Recheck of BMD in 1-2 years and   patient consult w/primary care provider are recommended.         X-ray of right humerus (9/18/2021) :  IMPRESSION:  Acute fracture through the proximal third of the right humerus      CT of head without contrast (12/15/2023) :  IMPRESSION:   1. Stable CT scan of the brain, no interval change since previous study dated 7/13/2022.       CT of cervical spine without contrast (12/15/2023) :  FINDINGS:  The patient is status post anterior interbody fusion between C5 and C7..  There is no fracture.  There is no prevertebral soft tissue swelling.  There are degenerative changes at C3-4, C4-5 and C7-T1.. No suspicious osseous lesions are present.  At C1-2, there is normal alignment of the dens relative to anterior arch of C1.  At C2-3, there is no disc herniation, canal or foraminal stenosis.  At C3-4, there is no disc herniation, canal or foraminal stenosis.  At C4-5, there is no disc herniation or canal stenosis. There is mild bilateral foraminal stenosis.  At C5-6, there are postoperative changes. There is mild canal, moderate right and mild left-sided foraminal stenosis.  At C6-7, there are postoperative changes. There is mild canal and mild-to-moderate bilateral foraminal stenosis.  At C7-T1, there is mild canal and mild-to-moderate bilateral foraminal stenosis.  There is a probable dialysis catheter overlying the right hemithorax.   IMPRESSION:   1. Stable CT scan of the cervical spine, no interval change since previous study dated 4/24/2023.  2.

## 2023-12-21 NOTE — PROGRESS NOTES
1615 Pt arrived in IR for tunneled dialysis catheter check.   1621 Pt moved to table.   1626 Catheter check complete.   1628 Pt moved back to bed. Positioned for comfort  1633 Pt transported to inpatient dialysis via bed with transport. Skin pink, warm, dry. Respirations even and unlabored at rest. No complaints voiced at this time.

## 2023-12-22 ENCOUNTER — CLINICAL DOCUMENTATION ONLY (OUTPATIENT)
Facility: CLINIC | Age: 68
End: 2023-12-22

## 2023-12-22 ENCOUNTER — APPOINTMENT (OUTPATIENT)
Dept: INTERVENTIONAL RADIOLOGY/VASCULAR | Age: 68
End: 2023-12-22
Attending: PHYSICAL MEDICINE & REHABILITATION
Payer: MEDICARE

## 2023-12-22 PROBLEM — E44.0 MODERATE MALNUTRITION (HCC): Chronic | Status: ACTIVE | Noted: 2023-03-03

## 2023-12-22 LAB
ALBUMIN SERPL BCG-MCNC: 3.3 G/DL (ref 3.5–5.1)
ALP SERPL-CCNC: 168 U/L (ref 38–126)
ALT SERPL W/O P-5'-P-CCNC: < 5 U/L (ref 11–66)
ANION GAP SERPL CALC-SCNC: 14 MEQ/L (ref 8–16)
AST SERPL-CCNC: 15 U/L (ref 5–40)
BASOPHILS ABSOLUTE: 0.1 THOU/MM3 (ref 0–0.1)
BASOPHILS NFR BLD AUTO: 1.1 %
BILIRUB CONJ SERPL-MCNC: < 0.2 MG/DL (ref 0–0.3)
BILIRUB SERPL-MCNC: 0.3 MG/DL (ref 0.3–1.2)
BUN SERPL-MCNC: 47 MG/DL (ref 7–22)
CALCIUM SERPL-MCNC: 9.6 MG/DL (ref 8.5–10.5)
CHLORIDE SERPL-SCNC: 101 MEQ/L (ref 98–111)
CHOLEST SERPL-MCNC: 149 MG/DL (ref 100–199)
CO2 SERPL-SCNC: 20 MEQ/L (ref 23–33)
CREAT SERPL-MCNC: 5 MG/DL (ref 0.4–1.2)
DEPRECATED MEAN GLUCOSE BLD GHB EST-ACNC: 102 MG/DL (ref 70–126)
DEPRECATED RDW RBC AUTO: 49.6 FL (ref 35–45)
EOSINOPHIL NFR BLD AUTO: 2.7 %
EOSINOPHILS ABSOLUTE: 0.2 THOU/MM3 (ref 0–0.4)
ERYTHROCYTE [DISTWIDTH] IN BLOOD BY AUTOMATED COUNT: 14.6 % (ref 11.5–14.5)
GFR SERPL CREATININE-BSD FRML MDRD: 9 ML/MIN/1.73M2
GLUCOSE BLD STRIP.AUTO-MCNC: 141 MG/DL (ref 70–108)
GLUCOSE BLD STRIP.AUTO-MCNC: 83 MG/DL (ref 70–108)
GLUCOSE BLD STRIP.AUTO-MCNC: 91 MG/DL (ref 70–108)
GLUCOSE SERPL-MCNC: 89 MG/DL (ref 70–108)
HBA1C MFR BLD HPLC: 5.4 % (ref 4.4–6.4)
HCT VFR BLD AUTO: 41.4 % (ref 37–47)
HDLC SERPL-MCNC: 92 MG/DL
HGB BLD-MCNC: 12.6 GM/DL (ref 12–16)
IMM GRANULOCYTES # BLD AUTO: 0.09 THOU/MM3 (ref 0–0.07)
IMM GRANULOCYTES NFR BLD AUTO: 1.1 %
LDLC SERPL CALC-MCNC: 46 MG/DL
LYMPHOCYTES ABSOLUTE: 2.5 THOU/MM3 (ref 1–4.8)
LYMPHOCYTES NFR BLD AUTO: 29.4 %
MCH RBC QN AUTO: 28.6 PG (ref 26–33)
MCHC RBC AUTO-ENTMCNC: 30.4 GM/DL (ref 32.2–35.5)
MCV RBC AUTO: 93.9 FL (ref 81–99)
MONOCYTES ABSOLUTE: 0.8 THOU/MM3 (ref 0.4–1.3)
MONOCYTES NFR BLD AUTO: 9 %
NEUTROPHILS NFR BLD AUTO: 56.7 %
NRBC BLD AUTO-RTO: 0 /100 WBC
PHOSPHATE SERPL-MCNC: 4.3 MG/DL (ref 2.4–4.7)
PLATELET # BLD AUTO: 170 THOU/MM3 (ref 130–400)
PMV BLD AUTO: 11.7 FL (ref 9.4–12.4)
POTASSIUM SERPL-SCNC: 5.2 MEQ/L (ref 3.5–5.2)
PREALB SERPL-MCNC: 20.3 MG/DL (ref 20–40)
PROT SERPL-MCNC: 6.1 G/DL (ref 6.1–8)
RBC # BLD AUTO: 4.41 MILL/MM3 (ref 4.2–5.4)
SEGMENTED NEUTROPHILS ABSOLUTE COUNT: 4.8 THOU/MM3 (ref 1.8–7.7)
SODIUM SERPL-SCNC: 135 MEQ/L (ref 135–145)
TRIGL SERPL-MCNC: 53 MG/DL (ref 0–199)
WBC # BLD AUTO: 8.4 THOU/MM3 (ref 4.8–10.8)

## 2023-12-22 PROCEDURE — 2709999900 IR FLUORO GUIDED CVA DEVICE PLMT/REPLACE/REMOVAL

## 2023-12-22 PROCEDURE — 80048 BASIC METABOLIC PNL TOTAL CA: CPT

## 2023-12-22 PROCEDURE — 90935 HEMODIALYSIS ONE EVALUATION: CPT

## 2023-12-22 PROCEDURE — 80061 LIPID PANEL: CPT

## 2023-12-22 PROCEDURE — 99232 SBSQ HOSP IP/OBS MODERATE 35: CPT | Performed by: PHYSICAL MEDICINE & REHABILITATION

## 2023-12-22 PROCEDURE — C1769 GUIDE WIRE: HCPCS

## 2023-12-22 PROCEDURE — 84134 ASSAY OF PREALBUMIN: CPT

## 2023-12-22 PROCEDURE — 97166 OT EVAL MOD COMPLEX 45 MIN: CPT

## 2023-12-22 PROCEDURE — 6370000000 HC RX 637 (ALT 250 FOR IP): Performed by: PHYSICAL MEDICINE & REHABILITATION

## 2023-12-22 PROCEDURE — 6360000002 HC RX W HCPCS: Performed by: RADIOLOGY

## 2023-12-22 PROCEDURE — 99232 SBSQ HOSP IP/OBS MODERATE 35: CPT | Performed by: INTERNAL MEDICINE

## 2023-12-22 PROCEDURE — 97110 THERAPEUTIC EXERCISES: CPT

## 2023-12-22 PROCEDURE — 36415 COLL VENOUS BLD VENIPUNCTURE: CPT

## 2023-12-22 PROCEDURE — 6370000000 HC RX 637 (ALT 250 FOR IP): Performed by: INTERNAL MEDICINE

## 2023-12-22 PROCEDURE — 2580000003 HC RX 258: Performed by: NURSE PRACTITIONER

## 2023-12-22 PROCEDURE — 84100 ASSAY OF PHOSPHORUS: CPT

## 2023-12-22 PROCEDURE — 97535 SELF CARE MNGMENT TRAINING: CPT

## 2023-12-22 PROCEDURE — 85025 COMPLETE CBC W/AUTO DIFF WBC: CPT

## 2023-12-22 PROCEDURE — 77001 FLUOROGUIDE FOR VEIN DEVICE: CPT

## 2023-12-22 PROCEDURE — 92523 SPEECH SOUND LANG COMPREHEN: CPT

## 2023-12-22 PROCEDURE — 97530 THERAPEUTIC ACTIVITIES: CPT

## 2023-12-22 PROCEDURE — 97129 THER IVNTJ 1ST 15 MIN: CPT

## 2023-12-22 PROCEDURE — 36581 REPLACE TUNNELED CV CATH: CPT

## 2023-12-22 PROCEDURE — 97162 PT EVAL MOD COMPLEX 30 MIN: CPT

## 2023-12-22 PROCEDURE — 1180000000 HC REHAB R&B

## 2023-12-22 PROCEDURE — 83036 HEMOGLOBIN GLYCOSYLATED A1C: CPT

## 2023-12-22 PROCEDURE — 80076 HEPATIC FUNCTION PANEL: CPT

## 2023-12-22 PROCEDURE — 6370000000 HC RX 637 (ALT 250 FOR IP): Performed by: NURSE PRACTITIONER

## 2023-12-22 PROCEDURE — 36556 INSERT NON-TUNNEL CV CATH: CPT

## 2023-12-22 PROCEDURE — 82948 REAGENT STRIP/BLOOD GLUCOSE: CPT

## 2023-12-22 RX ORDER — MIDAZOLAM HYDROCHLORIDE 1 MG/ML
1 INJECTION INTRAMUSCULAR; INTRAVENOUS ONCE
Status: COMPLETED | OUTPATIENT
Start: 2023-12-22 | End: 2023-12-22

## 2023-12-22 RX ADMIN — SEVELAMER CARBONATE 800 MG: 800 TABLET, FILM COATED ORAL at 06:58

## 2023-12-22 RX ADMIN — SODIUM ZIRCONIUM CYCLOSILICATE 10 G: 10 POWDER, FOR SUSPENSION ORAL at 08:53

## 2023-12-22 RX ADMIN — DOCUSATE SODIUM 100 MG: 100 CAPSULE, LIQUID FILLED ORAL at 09:02

## 2023-12-22 RX ADMIN — HYDROMORPHONE HYDROCHLORIDE 1 MG: 1 INJECTION, SOLUTION INTRAMUSCULAR; INTRAVENOUS; SUBCUTANEOUS at 11:03

## 2023-12-22 RX ADMIN — TRAMADOL HYDROCHLORIDE 50 MG: 50 TABLET, COATED ORAL at 22:02

## 2023-12-22 RX ADMIN — DOCOSANOL: 100 CREAM TOPICAL at 11:20

## 2023-12-22 RX ADMIN — DOCOSANOL: 100 CREAM TOPICAL at 06:11

## 2023-12-22 RX ADMIN — DOCOSANOL: 100 CREAM TOPICAL at 22:04

## 2023-12-22 RX ADMIN — FOLIC ACID 500 MCG: 1 TABLET ORAL at 08:53

## 2023-12-22 RX ADMIN — AMLODIPINE BESYLATE 5 MG: 5 TABLET ORAL at 08:53

## 2023-12-22 RX ADMIN — Medication 6 MG: at 22:02

## 2023-12-22 RX ADMIN — POLYETHYLENE GLYCOL 3350 17 G: 17 POWDER, FOR SOLUTION ORAL at 22:01

## 2023-12-22 RX ADMIN — MIDAZOLAM 1 MG: 1 INJECTION INTRAMUSCULAR; INTRAVENOUS at 11:02

## 2023-12-22 RX ADMIN — CALCITRIOL CAPSULES 0.25 MCG 0.25 MCG: 0.25 CAPSULE ORAL at 08:53

## 2023-12-22 RX ADMIN — CETIRIZINE HYDROCHLORIDE 5 MG: 5 TABLET ORAL at 08:53

## 2023-12-22 RX ADMIN — SODIUM CHLORIDE, PRESERVATIVE FREE 10 ML: 5 INJECTION INTRAVENOUS at 22:13

## 2023-12-22 RX ADMIN — ATORVASTATIN CALCIUM 10 MG: 10 TABLET, FILM COATED ORAL at 22:01

## 2023-12-22 RX ADMIN — MONTELUKAST SODIUM 10 MG: 10 TABLET ORAL at 08:53

## 2023-12-22 RX ADMIN — Medication 2000 MG: at 10:03

## 2023-12-22 RX ADMIN — CLOPIDOGREL BISULFATE 75 MG: 75 TABLET ORAL at 08:53

## 2023-12-22 RX ADMIN — FLUTICASONE PROPIONATE 2 SPRAY: 50 SPRAY, METERED NASAL at 09:01

## 2023-12-22 RX ADMIN — POLYETHYLENE GLYCOL 3350 17 G: 17 POWDER, FOR SOLUTION ORAL at 08:53

## 2023-12-22 RX ADMIN — FAMOTIDINE 10 MG: 20 TABLET ORAL at 08:53

## 2023-12-22 RX ADMIN — DOCUSATE SODIUM 100 MG: 100 CAPSULE, LIQUID FILLED ORAL at 22:01

## 2023-12-22 RX ADMIN — SODIUM CHLORIDE, PRESERVATIVE FREE 10 ML: 5 INJECTION INTRAVENOUS at 10:03

## 2023-12-22 RX ADMIN — SEVELAMER CARBONATE 800 MG: 800 TABLET, FILM COATED ORAL at 12:20

## 2023-12-22 RX ADMIN — SENNOSIDES 17.2 MG: 8.6 TABLET, FILM COATED ORAL at 22:01

## 2023-12-22 RX ADMIN — SEVELAMER CARBONATE 800 MG: 800 TABLET, FILM COATED ORAL at 17:32

## 2023-12-22 RX ADMIN — TRAZODONE HYDROCHLORIDE 50 MG: 50 TABLET ORAL at 22:01

## 2023-12-22 ASSESSMENT — PAIN DESCRIPTION - DESCRIPTORS
DESCRIPTORS: ACHING

## 2023-12-22 ASSESSMENT — PAIN DESCRIPTION - LOCATION
LOCATION: BACK
LOCATION: HIP
LOCATION: CHEST;BACK

## 2023-12-22 ASSESSMENT — PAIN DESCRIPTION - PAIN TYPE: TYPE: SURGICAL PAIN

## 2023-12-22 ASSESSMENT — PAIN DESCRIPTION - FREQUENCY: FREQUENCY: CONTINUOUS

## 2023-12-22 ASSESSMENT — PAIN - FUNCTIONAL ASSESSMENT: PAIN_FUNCTIONAL_ASSESSMENT: ACTIVITIES ARE NOT PREVENTED

## 2023-12-22 ASSESSMENT — PAIN SCALES - GENERAL
PAINLEVEL_OUTOF10: 3
PAINLEVEL_OUTOF10: 8

## 2023-12-22 ASSESSMENT — PAIN DESCRIPTION - ORIENTATION
ORIENTATION: LEFT
ORIENTATION: RIGHT

## 2023-12-22 ASSESSMENT — PAIN DESCRIPTION - ONSET: ONSET: PROGRESSIVE

## 2023-12-22 NOTE — CONSULTS
[facility]   Nephrology consult note  12/22/2023, 11:46 AM      Pt Name:    Deloris Watts  MRN:     [id number]     YOB: 1955  Admit Date:    12/21/2023  1:13 PM    Chief Complaint: Nephrology following for ESRD and hemodialysis    Subjective:  Patient was seen and examined this morning  Feels well denies any complaints  Rehab going well    Objective:  24HR INTAKE/OUTPUT:    Intake/Output Summary (Last 24 hours) at 12/22/2023 1146  Last data filed at 12/22/2023 1040  Gross per 24 hour   Intake 1000 ml   Output 77 ml   Net 923 ml      Admission weight: 47 kg (103 lb 9.9 oz)  Wt Readings from Last 3 Encounters:   12/22/23 50.8 kg (111 lb 15.9 oz)   12/20/23 47 kg (103 lb 9.9 oz)   12/15/23 45.4 kg (100 lb)        Vitals :   Vitals:    12/22/23 1100 12/22/23 1105 12/22/23 1110 12/22/23 1130   BP: (!) 173/82 (!) 162/70 (!) 152/74 (!) 148/83   Pulse: (!) 105 (!) 104 100 89   Resp: 12 12 16 16   Temp:       TempSrc:       SpO2: 92% 92% 92% 96%   Weight:       Height:           Physical examination  General Appearance: Awake and alert no no distress  Mouth/Throat: Oral mucosa moist  Neck: Accessory muscle  Lungs: Clear  Heart:  S1, S2 heard  GI: soft, non-tender, no guarding  Extremities no edema    Medications:  Infusion:    sodium chloride       Meds:    traZODone  50 mg Oral Nightly    sodium chloride flush  5-40 mL IntraVENous 2 times per day    sevelamer  800 mg Oral TID WC    senna  2 tablet Oral Nightly    polyethylene glycol  17 g Oral BID    montelukast  10 mg Oral Daily    lidocaine  1 patch TransDERmal Daily    folic acid  500 mcg Oral Daily    fluticasone  2 spray Each Nostril Daily    famotidine  10 mg Oral Daily    docusate sodium  1 enema Rectal Daily    docusate sodium  100 mg Oral BID    docosanol   Topical 5x Daily    cetirizine  5 mg Oral Daily    calcitRIOL  0.25 mcg Oral Daily    atorvastatin  10 mg Oral Nightly    amLODIPine  5 mg Oral Daily    linaclotide  290 mcg

## 2023-12-22 NOTE — CARE COORDINATION
Focus Note   Patient rested quietly through out the shift in bed. IR called this am and said to hold lunch. They will call when they get a hold of Dr. Newman.

## 2023-12-22 NOTE — PLAN OF CARE
Problem: Discharge Planning  Goal: Discharge to home or other facility with appropriate resources  2023 1140 by Kusum Guajardo LISW  Note:   [facility]  Physical Medicine Case Management Assessment    [x] Inpatient Rehabilitation Unit    Patient Name: Deloris Watts        MRN: [id number]    : 1955  (68 y.o.)  Gender: female   Date of Admission: 2023  1:13 PM    Family/Social/Home Environment:   Prior to admission, patient was living with her , Tenzin. Patient reported being independent with her ADLs. Tenzin did have to assist patient with washing her back. Tenzin completes housekeeping, meal prep, finances, errands and driving. Patient's support includes, Tenzin, Nicole (sister), Layla (daughter) and Umair (son). Patient reports that her children live locally, but they do work during the day. Tenzin provides most of patient's care. Patient's family physician is Johana Weldon MD. Patient prefers [facility]. Patient [facility] to [facility]. Patient is motivated to participate in therapy.    Social/Functional History  Lives With: Spouse  Type of Home: House  Home Layout: One level  Home Access: Stairs to enter without rails  Entrance Stairs - Number of Steps: 2 MARIANA  Bathroom Shower/Tub: Tub/Shower unit, Shower chair with back, Curtain  Bathroom Toilet: Handicap height  Bathroom Equipment: Shower chair, Hand-held shower, Grab bars in shower  Bathroom Accessibility: Accessible  Home Equipment: Walker, 4 wheeled, Wheelchair-manual, Walker, rolling, Grab bars, Reacher (transport w/c)  Has the patient had two or more falls in the past year or any fall with injury in the past year?: Yes (4)  ADL Assistance: Independent  Homemaking Assistance: Needs assistance (light meal prep, laundry.  Spouse performs other IADLs)  Homemaking Responsibilities: Yes  Ambulation Assistance: Independent  Transfer Assistance: Independent  Active : No  Patient's  Info:

## 2023-12-22 NOTE — H&P
[facility] [place]  Sedation/Analgesia History & Physical    Pt Name: Deloris Watts  MRN: [id number]  YOB: 1955  Provider Performing Procedure: Tank Newman MD, MD  Primary Care Physician: Johana Weldon MD    Formulation and discussion of sedation / procedure plans, risks, benefits, side effects and alternatives with patient and/or responsible adult completed.    PRE-PROCEDURE   DNR-CCA/DNR-CC []Yes [x]No  Brief History/Pre-Procedure Diagnosis: Malfunctioning dialysis cath           MEDICAL HISTORY  []CAD/Valve  []Liver Disease  []Lung Disease []Diabetes  []Hypertension []Renal Disease  []Additional information:       has a past medical history of Allergic rhinitis, Anemia in CKD (chronic kidney disease), Anxiety, CHF (congestive heart failure) (HCC), Closed fracture of right proximal humerus, Closed right ankle fracture, CVA (cerebral infarction), Depression, Fibromyalgia, Headache(784.0), Hemiplegia and hemiparesis following cerebral infarction affecting left non-dominant side (HCC), Hydronephrosis, Hyperlipidemia, Hypertension, Irritable bowel syndrome, Kidney failure, Osteoporosis, Unspecified cerebral artery occlusion with cerebral infarction, Unspecified sleep apnea, and Wrist fracture, right.    SURGICAL HISTORY   has a past surgical history that includes sinus surgery (10 years ago); Hemorrhoid surgery (unsure); Neck surgery (18  years ago); LASIK; Endoscopy, colon, diagnostic (unsure); Colonoscopy (2012); Tubal ligation; Carpal tunnel release (Right); Hysterectomy; Ovary removal (Bilateral); CT BIOPSY RENAL (12/28/2022); Cystoscopy (Left, 08/29/2023); Ureter surgery (N/A, 09/20/2023); Spine surgery (N/A, 12/18/2023); and Humerus fracture surgery (Right, 2021).  Additional information:       ALLERGIES   Allergies as of 12/21/2023 - Fully Reviewed 12/21/2023   Allergen Reaction Noted    Pioglitazone Nausea And Vomiting 04/09/2018    Amoxicillin  02/18/2022

## 2023-12-22 NOTE — PROGRESS NOTES
[facility]  Individualized Disclosure Statement      Patient: Deloris Watts      Scope of Service  [facility] provides 24 hour individualized service to patients with functional limitations due to, but not limited to: stroke, brain injury, spinal cord injury, major multiple trauma, fractures, amputation, and neurological disorders. The Rehabilitation Center provides rehabilitative nursing and medical services as well as physical, occupational, speech, and recreation therapies.  [facility] is fully accredited by the Commission on Accreditation of Rehabilitation Facilities (CARF) as a comprehensive provider of rehabilitation services.  Patients admitted to the [facility] receive a minimum of three hours of therapy per day, at least five days per week, with a revised therapy schedule on weekends and holidays.  Physical therapy, occupational therapy, and speech therapy are provided seven days per week including holidays.  Other therapeutic services are available on weekends and evenings as needed or scheduled.  Intensity of Treatment  Your treatment program will consist of Nursing Care and:  1.5 hours of Physical Therapy, per day  1.5 hours of Occupational Therapy, per day   30-60 minutes of Speech Therapy, per day  1 hour of Recreational Therapy, per week  Depending on your needs, the exact time spent with each of the above disciplines may fluctuate, however you will receive at least 3 hours of therapy at least 5 days per week.    [facility] maintains contracts with most insurance plans.  Depending on the type of coverage, the insurance may impose limits on the coverage for rehabilitation care.  Coverage is based on the premise that you are able to fully participate in the rehabilitation program and show continued progress. Please verify your own insurance information. A

## 2023-12-22 NOTE — FLOWSHEET NOTE
12/22/23 1325 12/22/23 1621   Vital Signs   BP (!) 171/76 (!) 166/83   Temp 97.8 °F (36.6 °C) 97.8 °F (36.6 °C)   Pulse 90 83   Respirations 16 20   Weight - Scale 50.2 kg (110 lb 10.7 oz) 50.2 kg (110 lb 10.7 oz)   Weight Method Bed scale Bed scale   Percent Weight Change -1.18 0   Post-Hemodialysis Assessment   Post-Treatment Procedures  --  Blood returned;Catheter Capped, clamped with Saline x2 ports   Machine Disinfection Process  --  Acid/Vinegar Clean;Heat Disinfect;Exterior Machine Disinfection   Blood Volume Processed (Liters)  --  50.3 L   Dialyzer Clearance  --  Lightly streaked   Duration of Treatment (minutes)  --  150 minutes   Heparin Amount Administered During Treatment (mL)  --  0 mL   Hemodialysis Intake (ml)  --  400 ml   Hemodialysis Output (ml)  --  400 ml   NET Removed (ml)  --  0   Tolerated Treatment  --  Good     Stable 2.5 hour treatment complete. Removed no fluid as per order. Tolerated fluid removal well. HD catheter ports flushed, clamped and capped. Dressing clean, dry and intact. Report given to primary RN. Treatment record printed for scanning into EMR.

## 2023-12-22 NOTE — PROGRESS NOTES
[facility]  INPATIENT PHYSICAL THERAPY  EVALUATION  [facility] - 8K-02/002-A    Time In: 0700  Time Out: 0830  Timed Code Treatment Minutes: 75 Minutes  Minutes: 90          Date: 2023  Patient Name: Deloris Watts,  Gender:  female        MRN: [id number]  : 1955  (68 y.o.)  Referral Date : 23   Referring Practitioner: Holly Whitaker APRN - CNP  Diagnosis: Traumatic compression fracture of L2 lumbar vertebra, closed, initial encounte  Treatment Diagnosis: difficulty in walking  Additional Pertinent Hx: Per H&P:Deloris Watts is a 68 y.o. right-handed  female with history of hypertension, hyperlipidemia, hydronephrosis, fibromyalgia, irritable bowel syndrome, osteoporosis, end-stage renal disease on hemodialysis since 2023, stroke with left side weakness in , anemia, depression, anxiety, right wrist and right ankle fracture treated conservatively, right proximal humeral fracture due to fall requiring ORIF, status post right carpal tunnel release surgery, status post cervical spine surgery, status post hysterectomy and bilateral oophorectomy, hemorrhoidectomy, sinus surgery, tubal ligation, LASEK surgery, is admitted to the inpatient rehabilitation unit on 2023 for intensive inpatient rehabilitation treatment of impairment and disability secondary to fall accident resulting L2 compression fracture requiring kyphoplasty, and liver laceration.She was found to have new L2 inferior endplate fracture, and hepatic laceration/contusion.  Nephrology was consulted to continue her hemodialysis.  MRI of thoracic and lumbar spine were ordered and performed on 12/15/2023.  Lumbar spine MRI done on 12/15/2023 confirmed the presence of acute L2 20% inferior endplate compression fracture without displacement of the fragment.  Thoracic spine MRI revealed no fracture or disc protrusion, and normal spinal cord. Neurosurgery was consulted on  12/16/2023 for L2 compression fracture and kyphoplasty was planned.  On 12/18/2023 the patient underwent L2 kyphoplasty for pathological L2 compression fracture by Dr. Nimisha Garza.  Patient transferred to inpatient rehab on 12/21/23.     Restrictions/Precautions:  Restrictions/Precautions: Fall Risk, General Precautions  Position Activity Restriction  Other position/activity restrictions: dialysis port right chest, s/p kyphoplasty    Subjective:  Chart Reviewed: Yes  Patient assessed for rehabilitation services?: Yes  Family / Caregiver Present: No  Subjective: Patient in room in bed, agreeable to PT.  Pt cooperative and pleasant.    General:  Overall Orientation Status: Within Functional Limits  Orientation Level: Oriented to time, Oriented to person, Oriented to place, Oriented to situation  Vision: Impaired  Vision Exceptions: Wears glasses at all times  Hearing: Within functional limits       Pain: low back, not rated    Vitals:  attempted to assess O2, however unable to get reading on pulse ox (pt has finger nail polish on).  No signs of distress during session.  See most recent vitals with nursing in flow sheet.    Social/Functional History:    Lives With: Spouse  Type of Home: House  Home Layout: One level  Home Access: Stairs to enter without rails  Entrance Stairs - Number of Steps: 2 MARIANA  Home Equipment: Walker, 4 wheeled, Wheelchair-manual, Walker, rolling, Grab bars, Reacher (transport w/c)     Bathroom Shower/Tub: Tub/Shower unit, Shower chair with back, Curtain  Bathroom Toilet: Handicap height  Bathroom Equipment: Shower chair, Hand-held shower, Grab bars in shower  Bathroom Accessibility: Accessible  IADL Comments: Pt's spouse does most of the cooking and cleaning d/t poor standing endurance       ADL Assistance: Independent  Homemaking Assistance: Needs assistance (light meal prep, laundry.  Spouse performs other IADLs)  Homemaking Responsibilities: Yes  Ambulation Assistance: Independent  Transfer

## 2023-12-22 NOTE — OP NOTE
Department of Radiology  Post Procedure Progress Note      Pre-Procedure Diagnosis:  Renal Failure, Malfunctioning dialysis cath     Procedure Performed: Dialysis Catheter exchange      Anesthesia: local , versed and dilaudid    Findings: successful    Immediate Complications:  None    Estimated Blood Loss: minimal    SEE DICTATED PROCEDURE NOTE FOR COMPLETE DETAILS.      Tank Newman MD   12/22/2023 11:11 AM

## 2023-12-22 NOTE — PROGRESS NOTES
1045 Patient received in IR for tunneled dialysis catheter exchange.   1050 This procedure has been fully reviewed with the patient and written informed consent has been obtained.  1100 Procedure started with Dr. Newman.   1105 Procedure completed; patient tolerated well. Dressing to right chest; no bleeding noted.  1115 Patient on bed; comfort ensured.  1120 Patient taken to 8K via bed.

## 2023-12-22 NOTE — PLAN OF CARE
Problem: Discharge Planning  Goal: Discharge to home or other facility with appropriate resources  12/22/2023 0257 by Mariana Moore LPN  Outcome: Progressing  Flowsheets (Taken 12/21/2023 2238)  Discharge to home or other facility with appropriate resources: Identify barriers to discharge with patient and caregiver  Note:  Patient continues to progress with therapy services. Social service working on needs for discharge.  Patient plans to discharge home 1/4/2023.     Problem: Safety - Adult  Goal: Free from fall injury  12/22/2023 0257 by Mariana Moore LPN  Outcome: Progressing  Flowsheets (Taken 12/21/2023 1622 by Lupe Thakur, RN)  Free From Fall Injury: Instruct family/caregiver on patient safety  Note: No falls sustained at this time. Patient alert to call light and uses appropriately to alert staff to needs. Bed/chair alarms in use.    Care plan reviewed with patient.  Patient verbalize understanding of the plan of care and contribute to goal setting.

## 2023-12-22 NOTE — PROGRESS NOTES
[facility]  Recreational Therapy  Inpatient Rehabilitation Evaluation        Time Spent with Patient: 10 minutes    Date:  12/22/2023       Patient Name: Deloris Watts      MRN: [id number]       YOB: 1955 (68 y.o.)       Gender: female  Diagnosis: traumatic compression fracture of L2 lumbar vertebra, closed initial encounter  Referring Practitioner: ordering:  Holly Whitaker, CARLOS MANUEL - CNP, attending: Jax Whatley MD    RESTRICTIONS/PRECAUTIONS:  Restrictions/Precautions: Fall Risk, General Precautions     Hearing: Within functional limits    PAIN: 3-lying in bed    SUBJECTIVE:  pt lives with her  of 42 yrs-they have 5 children between them, 7 grandchildren and 2 great grandchildren     VISION:  Glasses    HEARING: Within Normal Limit    LEISURE INTERESTS:   Pt states she had a stroke 20 yrs ago and after that they did not travel like they use to-pt has dialysis M-W-F-pt states she would like some word search puzzles, coloring materials, deck of cards to play with  in free time and she likes to make crafts-she  has a dog Sergio at home and would love to visit-RT will get the ok from [facility] ve for a visit-pt pleasant and social-going down for a medical test at this time     BARRIERS TO LEISURE INTERESTS:    Decreased endurance and Pain        Patient Education  New Education Provided: Importance of Leisure, RT Plan of Care    Plan:  Continue to follow patient through this admission  Include patient in appropriate groups  See patient individually    Electronically signed by: Elena Tan, CTRS  Date: 12/22/2023

## 2023-12-22 NOTE — CONSULTS
Comprehensive Nutrition Assessment    Type and Reason for Visit:  Initial, Consult (Nutrition Assessment)    Nutrition Recommendations/Plan:   Recommend Folbee Plus, Renal Multivitamin daily.  Continue current diet  Started Nepro BID.     Malnutrition Assessment:  Malnutrition Status:  Moderate malnutrition (12/22/23 0914)    Context:  Chronic Illness     Findings of the 6 clinical characteristics of malnutrition:  Energy Intake:  75% or less estimated energy requirements for 1 month or longer (per pt report)  Weight Loss:  No significant weight loss (question accuracy of admit weight?)     Body Fat Loss:  Mild body fat loss (Moderate) Orbital, Triceps, Fat Overlying Ribs   Muscle Mass Loss:   (Moderate) Temples (temporalis), Clavicles (pectoralis & deltoids)  Fluid Accumulation:  No significant fluid accumulation Extremities   Strength:  Not Performed    Nutrition Assessment: Pt. moderately malnourished AEB criteria as listed above. At risk for further nutrition compromise r/t admit d/t fall with compression fracture s/p kyphoplasty on 12/18, increased nutrient needs to aid in wound healing and underlying medical condition (Hx: CKD, CHF, CVA. Depression., Fibromyalgia, Hydronephrosis, hemiplegia, HTN, HLD, Osteoporosis).        Nutrition Related Findings:    Pt. Report/Treatments/Miscellaneous: Pt seen this morning after breakfast with - pt reports decreased intake of meals over the past month d/t decreased appetite consuming 75% or less of most meals. Pt states she was trying to drink more milkshakes at home from [employer] when she wasn't hungry. Pt states she is amenable to try the Nepro BID.  GI Status: Pt denies any N/V. Last BM x1 on 12/21.  Pertinent Labs: 12/22/23: K+ 5.2, BUN 47, Cr. 5  Pertinent Meds: Lipitor, Calcitriol, Colace, Pepcid, Folic Acid, Enemeez, Glycolax, Senokot, Renvela    Wound Type: Surgical Incision (s/p kyphoplasty on 12/18/23)       Current Nutrition Intake & Therapies:

## 2023-12-22 NOTE — PROGRESS NOTES
[facility]  SPEECH THERAPY  [facility]  Speech - Language - Cognitive Evaluation + Cognitive tx    SLP Individual Minutes  Time In: 1000  Time Out: 1024  Minutes: 24  Timed Code Treatment Minutes: 8 Minutes     Zowsnr-Xycwxddk-Kuxkcqolm evaluation: 16 minutes  Cognitive tx: 8 minutes    Date: 2023  Patient Name: Deloris Watts      CSN: [id number]   : 1955  (68 y.o.)  Gender: female   Referring Physician:  Holly Whitaker APRN - CNP  Diagnosis: Traumatic compression fracture of L2 lumbar vertebra, closed, initial encounter  Precautions: fall risk  History of Present Illness/Injury: Patient admitted to [facility] with above diagnosis: see physician H&P for further information. Per chart review, \"Deloris Watts is a 68 y.o. right-handed  female with history of hypertension, hyperlipidemia, hydronephrosis, fibromyalgia, irritable bowel syndrome, osteoporosis, end-stage renal disease on hemodialysis since 2023, stroke with left side weakness in , anemia, depression, anxiety, right wrist and right ankle fracture treated conservatively, right proximal humeral fracture due to fall requiring ORIF, status post right carpal tunnel release surgery, status post cervical spine surgery, status post hysterectomy and bilateral oophorectomy, hemorrhoidectomy, sinus surgery, tubal ligation, LASEK surgery, is admitted to the inpatient rehabilitation unit on 2023 for intensive inpatient rehabilitation treatment of impairment and disability secondary to fall accident resulting L2 compression fracture requiring kyphoplasty, and liver laceration.       The patient says she was trying to put on her glasses standing in front on her dresser in the bedroom when suddenly she lost balance and fell backward to the floor at night of 2023.  She immediately feels severe pain on her lower back.  She did not lose consciousness.  She managed to crawl back to her  bed at the time.  Her  called 911 and EMS brought the patient to [facility] ER in early morning of 12/15/2023.  She complains of low back pain and some pain at her head in ER.  Her blood pressure was 175/94 in ER.  Her BUN/creatinine was 51/6.1.  At home she take aspirin and Plavix.  CT of the head, cervical spine, abdomen and pelvis, and lumbar spine were done on 12/15/2023.  She was found to have new L2 inferior endplate fracture, and hepatic laceration/contusion.  Nephrology was consulted to continue her hemodialysis.  MRI of thoracic and lumbar spine were ordered and performed on 12/15/2023.  Lumbar spine MRI done on 12/15/2023 confirmed the presence of acute L2 20% inferior endplate compression fracture without displacement of the fragment.  Thoracic spine MRI revealed no fracture or disc protrusion, and normal spinal cord. Neurosurgery was consulted on 12/16/2023 for L2 compression fracture and kyphoplasty was planned.  On 12/18/2023 the patient underwent L2 kyphoplasty for pathological L2 compression fracture by Dr. Nimisha Garza.  PM&R was consulted on 12/19/2023 and intensive inpatient rehab treatment was recommended.  The patient's hospital course also was complicated by constipation requiring digital disimpaction on 12/21/2023.  Her hospital course also was complicated by difficulty encountered in hemodialysis.  However interventional radiology central venous catheter check was done on 12/21/2023 and showed no evidence of fibrin sheath and normally functioning catheter lumens.     The patient says she still have significant sharp knife stabbing pain at the low back with intensity varying from 8/10 level up to 10/10 level.  She says she did not notice any significant symptomatic improvement after the kyphoplasty.  She denies having any radiating pain down to her lower extremities.  She says she feels fatigue with generalized weakness.  She had reduced appetite.  She says she has history

## 2023-12-22 NOTE — PROGRESS NOTES
[facility]  INPATIENT OCCUPATIONAL THERAPY  [facility]  EVALUATION    Time:    Time In: 0900  Time Out: 1000  Timed Code Treatment Minutes: 45 Minutes  Minutes: 60          Date: 2023  Patient Name: Deloris Watts,   Gender: female      MRN: [id number]  : 1955  (68 y.o.)  Referring Practitioner: ordering:  Holly Whitaker APRN - CNP, attending: Jax Whatley MD  Diagnosis: traumatic compression fracture of L2 lumbar vertebra, closed initial encounter  Additional Pertinent Hx: Deloris is a 68 year old female whom presented to [facility] ED on 12/15/23 s/p fall on 23.  It is noted that pt has demonstrated reduced balance.  CT imaging of brain completed with no abnormalities, CT of cervical spine indicated fusion at C5-7 with no new abnormalities, CT of lumbar spine shows new inferior endplate fx at L2 and multiple levels of foraminal stenosis.  CT of abdomen/pelvis noted suggestive hepatic laceration/contusion.  It is noted pt has mild TTP of abdomen.  Pt. Underwent an L2 kyphoplasty on 23.  Pt. transferred to the IPR unit on 23 for further medical care and referred to silled OT services.    Restrictions/Precautions:  Restrictions/Precautions: Fall Risk, General Precautions  Position Activity Restriction  Other position/activity restrictions: Pt. s/p kyphoplasty, dialysis MWF with port in R chest region    Subjective  Chart Reviewed: Yes, Orders, Progress Notes  Patient assessed for rehabilitation services?: Yes  Pt. Resting in bed upon arrival and agreeable to OT session.  Per nursing staff pt is set to have dialysis port changed this date and will be leaving unit to do so and then go to dialysis this date.        Pain: 7/10 in back with movement, no pain noted at rest.     Vitals: Vitals not assessed per clinical judgement, see nursing flowsheet    Social/Functional History:  Lives With: Spouse  Type of Home: House  Home

## 2023-12-22 NOTE — H&P
Formulation and discussion of sedation / procedure plans, risks, benefits, side effects and alternatives with patient and/or responsible adult completed.    History and Physical reviewed and unchanged.    Electronically signed by Tank Newman MD on 12/22/23 at 10:56 AM EST

## 2023-12-22 NOTE — PLAN OF CARE
Problem: Discharge Planning  Goal: Discharge to home or other facility with appropriate resources  12/22/2023 1020 by Lo Carrington RN  Outcome: Progressing  Flowsheets (Taken 12/22/2023 0830)  Discharge to home or other facility with appropriate resources:   Identify barriers to discharge with patient and caregiver   Arrange for needed discharge resources and transportation as appropriate   Identify discharge learning needs (meds, wound care, etc)   Refer to discharge planning if patient needs post-hospital services based on physician order or complex needs related to functional status, cognitive ability or social support system  12/22/2023 0257 by Mariana Moore LPN  Outcome: Progressing  Flowsheets (Taken 12/21/2023 2238)  Discharge to home or other facility with appropriate resources: Identify barriers to discharge with patient and caregiver  Note:  Patient continues to progress with therapy services. Social service working on needs for discharge.  Patient plans to discharge home 1/4/2023.     Problem: Safety - Adult  Goal: Free from fall injury  12/22/2023 1020 by Lo Carrington RN  Outcome: Progressing  12/22/2023 0257 by Mariana Moore LPN  Outcome: Progressing  Flowsheets (Taken 12/21/2023 1622 by Lupe Thakur, RN)  Free From Fall Injury: Instruct family/caregiver on patient safety  Note: No falls sustained at this time. Patient alert to call light and uses appropriately to alert staff to needs. Bed/chair alarms in use.      Problem: ABCDS Injury Assessment  Goal: Absence of physical injury  Outcome: Progressing     Problem: Pain  Goal: Verbalizes/displays adequate comfort level or baseline comfort level  Outcome: Progressing  Flowsheets (Taken 12/21/2023 2238 by Nam, Karime Carmel, LPN)  Verbalizes/displays adequate comfort level or baseline comfort level:   Encourage patient to monitor pain and request assistance   Assess pain using appropriate pain scale   Administer analgesics based on

## 2023-12-22 NOTE — PROGRESS NOTES
This Pre Admission Screen is a refiled document from the acute stay. The Pre Admission was completed and signed on 2023 at 2:53 by Dr. Lindsay Edgar.          [facility]  Acute Inpatient Rehab Preadmission Assessment     Patient Name: Deloris Watts        Ethnicity:Not of , /a, or Thai origin  Race:White  MRN:   [id number]    : 1955  (68 y.o.)  Gender: female      Admitted from:[facility]  Initial Assessment     Date of admission to the hospital: 12/15/2023 12:15 PM  Date patient eligible for admission:2023     Primary Diagnosis: Trauma by fall       Did patient have surgery?  yes - KYPHOPLASTY L2     Physicians: Jose Dunn MD,  Dr. Edgar, Dr. Mosquera, Dr. Cuadra, Dr. Garza     Risk for clinical complications/co-morbidities:   Past Medical History        Past Medical History:   Diagnosis Date    Allergic rhinitis      Anemia in CKD (chronic kidney disease)      Anxiety      CHF (congestive heart failure) (HCC)      CVA (cerebral infarction)      Depression      Fibromyalgia      Headache(784.0)      Hemiplegia and hemiparesis following cerebral infarction affecting left non-dominant side (HCC)      Hydronephrosis 2023     Urogenital Implants, calculus of ureter, UTI    Hyperlipidemia      Hypertension      Irritable bowel syndrome      Kidney failure       Chronic Kidney Disease, Renal Dialysis    Unspecified cerebral artery occlusion with cerebral infarction      Unspecified sleep apnea              Financial Information  Primary insurance: Medicare     Secondary Insurance:   BCBS/[place] Medicare Supplement     Has the patient had two or more falls in the past year or any fall with injury in the past year?   yes     Did the patient have major surgery during the 100 days prior to admission?  yes     Precautions: Restrictions/Precautions: Fall Risk       Isolation Precautions: None                  Physiatrist:  Dr. Edgar    Self-Care:  Modified independence     Expected level of Improvement in Sphincter Control:  Modified independence     Expected level of Improvement in Transfers: Modified independence     Expected level of Improvement in Locomotion:  Modified independence     Expected level of Improvement in Communication and Social Cognition: Modified independence     Expected length of time to achieve that level of improvement: 2 weeks     Current rehab issues: ADL dysfunction, bladder management, bowel management, carry over of therapy techniques, discharge planning, disease and co-morbidity management, gait/mobility dysfunction, medication management, nutrition and hydration management, Ongoing assessment of safety, Pain management, Patient and family education, Prevention of secondary complications, Skin Integrity.     Required therapy: Physical Therapy, Occupational Therapy 3 hours per day, 5-6 days per week.  Recreational Therapy 1 hour per week.     Expected Discharge Destination: Home     Expected Post Discharge Treatments: Out Patient     Other information relevant to the care needs:   Lives With: Spouse  Type of Home: House  Home Layout: One level  Home Access: Stairs to enter without rails  Entrance Stairs - Number of Steps: 1  Bathroom Shower/Tub: Tub/Shower unit, Shower chair with back  Bathroom Toilet: Handicap height  Bathroom Equipment: Grab bars in shower  Bathroom Accessibility: Accessible  Home Equipment: Walker, 4 wheeled, Wheelchair-manual  ADL Assistance: Independent  Homemaking Assistance: Independent  Homemaking Responsibilities: Yes  Ambulation Assistance: Independent  Transfer Assistance: Independent  IADL Comments: Pt's spouse does most of the cooking and cleaning d/t poor standing endurance  Additional Comments: Pt amb with rollator;  reports frequent falls however pt denies; recent SNF stay for rehab     Acute Inpatient Rehabilitation Disclosure Statement provided to patient.  Patient verbalized

## 2023-12-22 NOTE — PROGRESS NOTES
Physical Medicine & Rehabilitation Progress Note    Chief Complaint:  Low back pain, generalized weakness after a fall    Subjective:    Deloris Watts is a 68 y.o. right-handed  female with history of hypertension, hyperlipidemia, hydronephrosis, fibromyalgia, irritable bowel syndrome, osteoporosis, end-stage renal disease on hemodialysis since January 2023, stroke with left side weakness in 1990s, anemia, depression, anxiety, right wrist and right ankle fracture treated conservatively, right proximal humeral fracture due to fall requiring ORIF, status post right carpal tunnel release surgery, status post cervical spine surgery, status post hysterectomy and bilateral oophorectomy, hemorrhoidectomy, sinus surgery, tubal ligation, LASEK surgery, was admitted on 12/21/2023 for intensive inpatient management of impairment & disability secondary to fall accident resulting L2 compression fracture requiring kyphoplasty, and liver laceration.       The patient says she was trying to put on her glasses standing in front on her dresser in the bedroom when suddenly she lost balance and fell backward to the floor at night of 12/14/2023.  She immediately feels severe pain on her lower back.  She did not lose consciousness.  She managed to crawl back to her bed at the time.  Her  called 911 and EMS brought the patient to [facility] ER in early morning of 12/15/2023.  She complains of low back pain and some pain at her head in ER.  Her blood pressure was 175/94 in ER.  Her BUN/creatinine was 51/6.1.  At home she take aspirin and Plavix.  CT of the head, cervical spine, abdomen and pelvis, and lumbar spine were done on 12/15/2023.  She was found to have new L2 inferior endplate fracture, and hepatic laceration/contusion.  Nephrology was consulted to continue her hemodialysis.  MRI of thoracic and lumbar spine were ordered and performed on 12/15/2023.  Lumbar spine MRI done on 12/15/2023 confirmed  LDL Calculated 46 mg/dL   Hemoglobin A1C    Collection Time: 12/22/23  6:28 AM   Result Value Ref Range    Hemoglobin A1C 5.4 4.4 - 6.4 %    AVERAGE GLUCOSE 102 70 - 126 mg/dL   Anion Gap    Collection Time: 12/22/23  6:28 AM   Result Value Ref Range    Anion Gap 14.0 8.0 - 16.0 meq/L   Glomerular Filtration Rate, Estimated    Collection Time: 12/22/23  6:28 AM   Result Value Ref Range    Est, Glom Filt Rate 9 (A) >60 ml/min/1.73m2         Impression:  Status post fall resulting  Lumbar spine contusion resulting lumbar spine sprain and muscular strain with acute L2 inferior endplate pathological (due to osteoporosis) compression fracture requiring kyphoplasty  Liver laceration/contusion  History of stroke with left hemiparesis  End-stage renal disease requiring hemodialysis  Nonfunctioning hemodialysis catheter   Hyperkalemia  Hypertension  Irritable bowel syndrome  Hyperlipidemia  Osteoporosis  History of hydronephrosis  History of fibromyalgia  History of depression and anxiety  History of right proximal humerus fracture requiring ORIF  History of right wrist fracture requiring casting  History of right ankle fracture requiring casting    The patient's condition is stable.  Difficulty was encountered yesterday for patient's hemodialysis.  Nephrology service has requested interventional radiology service to replace hemodialysis catheter.  The patient continued to complains of fatigue with generalized weakness.  She is starting the intensive rehab treatment today.  She also scheduled to have hemodialysis today which is scheduled to be done after the hemodialysis catheter was replaced.      Plan:  Continue intensive PT/OT/SLP/RT inpatient rehabilitation program at least 3 hours per day, 5 days per week in order to improve functional status prior to discharge.  Family education and training will be completed.  Equipment evaluations and recommendations will be completed as appropriate.       Rehabilitation nursing

## 2023-12-22 NOTE — PROGRESS NOTES
[facility]  INPATIENT REHABILITATION  TEAM CONFERENCE NOTE    Conference Date: 2023  Admit Date:  2023  1:13 PM  Patient Name: Deloris Watts    MRN: [id number]    : 1955  (68 y.o.)  Rehabilitation Admitting Diagnosis:  Traumatic compression fracture of L2 lumbar vertebra, closed, initial encounter ([facility]) [S32.020A]  Referring Practitioner: Holly Whitaker APRN - CNP      CASE MANAGEMENT  Current issues/needs regarding patient and family discharge status: Prior to admission, patient was living with her , Tenzin. Patient reported being independent with her ADLs. Tenzin did have to assist patient with washing her back. Tenzin completes housekeeping, meal prep, finances, errands and driving. Patient's support includes, Tenzin, Nicole (sister), Layla (daughter) and Umair (son). Patient reports that her children live locally, but they do work during the day. Tenzin provides most of patient's care. Patient's family physician is Johana Weldon MD. Patient prefers [facility]. Patient goes to [facility] at [facility]. Patient is motivated to participate in therapy.     PHYSICAL THERAPY  Mr Watts met 2/5 STG's and 0/7 LTG's.  Patient met STG for gait progressing from 15' with a RW and CGA to up to 200' with a RW and SBA and met STG for stairs, ascending/descending 4 steps wit bilateral hand rails and CGA.  Patient is demonstrating good progress towards other goals as well despite short length of stay, progressing supine < > sit from min A/CGA to CGA, sit < > stand from CGA to SBA, and tinetti from 14 to 17/28.  Patient limited by back pain and LE weakness.  Mrs Watts will continue to benefit from skilled PT services to improve her ability to complete functional mobility, reduce her risk for falls and allow patient to return home safely.  Equipment Needed: No (has RW, rollator, wheelchair and transport chair)    SPEECH THERAPY  Summary: Patient has met 1/4 STG's and 0/1

## 2023-12-23 LAB
GLUCOSE BLD STRIP.AUTO-MCNC: 141 MG/DL (ref 70–108)
GLUCOSE BLD STRIP.AUTO-MCNC: 90 MG/DL (ref 70–108)

## 2023-12-23 PROCEDURE — 97116 GAIT TRAINING THERAPY: CPT

## 2023-12-23 PROCEDURE — 97129 THER IVNTJ 1ST 15 MIN: CPT

## 2023-12-23 PROCEDURE — 97530 THERAPEUTIC ACTIVITIES: CPT

## 2023-12-23 PROCEDURE — 97110 THERAPEUTIC EXERCISES: CPT

## 2023-12-23 PROCEDURE — 1180000000 HC REHAB R&B

## 2023-12-23 PROCEDURE — 97130 THER IVNTJ EA ADDL 15 MIN: CPT

## 2023-12-23 PROCEDURE — 6370000000 HC RX 637 (ALT 250 FOR IP): Performed by: NURSE PRACTITIONER

## 2023-12-23 PROCEDURE — 6370000000 HC RX 637 (ALT 250 FOR IP): Performed by: PHYSICAL MEDICINE & REHABILITATION

## 2023-12-23 PROCEDURE — 97535 SELF CARE MNGMENT TRAINING: CPT

## 2023-12-23 PROCEDURE — 82948 REAGENT STRIP/BLOOD GLUCOSE: CPT

## 2023-12-23 PROCEDURE — 2580000003 HC RX 258: Performed by: NURSE PRACTITIONER

## 2023-12-23 PROCEDURE — 99232 SBSQ HOSP IP/OBS MODERATE 35: CPT | Performed by: INTERNAL MEDICINE

## 2023-12-23 RX ADMIN — DOCOSANOL: 100 CREAM TOPICAL at 06:41

## 2023-12-23 RX ADMIN — SEVELAMER CARBONATE 800 MG: 800 TABLET, FILM COATED ORAL at 09:22

## 2023-12-23 RX ADMIN — AMLODIPINE BESYLATE 5 MG: 5 TABLET ORAL at 09:22

## 2023-12-23 RX ADMIN — FOLIC ACID 500 MCG: 1 TABLET ORAL at 09:23

## 2023-12-23 RX ADMIN — Medication 6 MG: at 20:37

## 2023-12-23 RX ADMIN — SODIUM CHLORIDE, PRESERVATIVE FREE 10 ML: 5 INJECTION INTRAVENOUS at 09:25

## 2023-12-23 RX ADMIN — SEVELAMER CARBONATE 800 MG: 800 TABLET, FILM COATED ORAL at 17:42

## 2023-12-23 RX ADMIN — DOCOSANOL: 100 CREAM TOPICAL at 17:42

## 2023-12-23 RX ADMIN — SEVELAMER CARBONATE 800 MG: 800 TABLET, FILM COATED ORAL at 12:42

## 2023-12-23 RX ADMIN — SODIUM CHLORIDE, PRESERVATIVE FREE 10 ML: 5 INJECTION INTRAVENOUS at 20:39

## 2023-12-23 RX ADMIN — FAMOTIDINE 10 MG: 20 TABLET ORAL at 09:22

## 2023-12-23 RX ADMIN — CETIRIZINE HYDROCHLORIDE 5 MG: 5 TABLET ORAL at 09:22

## 2023-12-23 RX ADMIN — DOCUSATE SODIUM 100 MG: 100 CAPSULE, LIQUID FILLED ORAL at 20:32

## 2023-12-23 RX ADMIN — POLYETHYLENE GLYCOL 3350 17 G: 17 POWDER, FOR SOLUTION ORAL at 20:34

## 2023-12-23 RX ADMIN — SENNOSIDES 17.2 MG: 8.6 TABLET, FILM COATED ORAL at 20:32

## 2023-12-23 RX ADMIN — ATORVASTATIN CALCIUM 10 MG: 10 TABLET, FILM COATED ORAL at 20:33

## 2023-12-23 RX ADMIN — DOCOSANOL: 100 CREAM TOPICAL at 12:42

## 2023-12-23 RX ADMIN — MONTELUKAST SODIUM 10 MG: 10 TABLET ORAL at 09:22

## 2023-12-23 RX ADMIN — TRAZODONE HYDROCHLORIDE 50 MG: 50 TABLET ORAL at 20:33

## 2023-12-23 RX ADMIN — DOCUSATE SODIUM 100 MG: 100 CAPSULE, LIQUID FILLED ORAL at 09:22

## 2023-12-23 RX ADMIN — DOCOSANOL: 100 CREAM TOPICAL at 20:33

## 2023-12-23 RX ADMIN — CLOPIDOGREL BISULFATE 75 MG: 75 TABLET ORAL at 09:22

## 2023-12-23 RX ADMIN — POLYETHYLENE GLYCOL 3350 17 G: 17 POWDER, FOR SOLUTION ORAL at 09:24

## 2023-12-23 RX ADMIN — CALCITRIOL CAPSULES 0.25 MCG 0.25 MCG: 0.25 CAPSULE ORAL at 09:22

## 2023-12-23 RX ADMIN — ACETAMINOPHEN 650 MG: 325 TABLET ORAL at 15:42

## 2023-12-23 ASSESSMENT — PAIN DESCRIPTION - FREQUENCY: FREQUENCY: CONTINUOUS

## 2023-12-23 ASSESSMENT — PAIN DESCRIPTION - LOCATION
LOCATION: BACK
LOCATION: BACK

## 2023-12-23 ASSESSMENT — PAIN DESCRIPTION - DESCRIPTORS
DESCRIPTORS: ACHING
DESCRIPTORS: ACHING

## 2023-12-23 ASSESSMENT — PAIN DESCRIPTION - ONSET: ONSET: PROGRESSIVE

## 2023-12-23 ASSESSMENT — PAIN - FUNCTIONAL ASSESSMENT
PAIN_FUNCTIONAL_ASSESSMENT: ACTIVITIES ARE NOT PREVENTED
PAIN_FUNCTIONAL_ASSESSMENT: ACTIVITIES ARE NOT PREVENTED

## 2023-12-23 ASSESSMENT — PAIN SCALES - GENERAL
PAINLEVEL_OUTOF10: 3
PAINLEVEL_OUTOF10: 5
PAINLEVEL_OUTOF10: 0

## 2023-12-23 ASSESSMENT — PAIN DESCRIPTION - PAIN TYPE: TYPE: SURGICAL PAIN

## 2023-12-23 ASSESSMENT — PAIN DESCRIPTION - ORIENTATION: ORIENTATION: MID;LEFT

## 2023-12-23 NOTE — PROGRESS NOTES
[facility]  INPATIENT PHYSICAL THERAPY  DAILY NOTE  [facility] - [id number]-02/[id number]    Time In: 0700  Time Out: 0830  Timed Code Treatment Minutes: 90 Minutes  Minutes: 90          Date: 2023  Patient Name: Deloris Watts,  Gender:  female        MRN: [id number]  : 1955  (68 y.o.)  Referral Date : 23  Referring Practitioner: Holly Whitaker APRN - CNP  Diagnosis: Traumatic compression fracture of L2 lumbar vertebra, closed, initial encounte  Treatment Diagnosis: difficulty in walking  Additional Pertinent Hx: Per H&P:Dleoris Watts is a 68 y.o. right-handed  female with history of hypertension, hyperlipidemia, hydronephrosis, fibromyalgia, irritable bowel syndrome, osteoporosis, end-stage renal disease on hemodialysis since 2023, stroke with left side weakness in , anemia, depression, anxiety, right wrist and right ankle fracture treated conservatively, right proximal humeral fracture due to fall requiring ORIF, status post right carpal tunnel release surgery, status post cervical spine surgery, status post hysterectomy and bilateral oophorectomy, hemorrhoidectomy, sinus surgery, tubal ligation, LASEK surgery, is admitted to the inpatient rehabilitation unit on 2023 for intensive inpatient rehabilitation treatment of impairment and disability secondary to fall accident resulting L2 compression fracture requiring kyphoplasty, and liver laceration.She was found to have new L2 inferior endplate fracture, and hepatic laceration/contusion.  Nephrology was consulted to continue her hemodialysis.  MRI of thoracic and lumbar spine were ordered and performed on 12/15/2023.  Lumbar spine MRI done on 12/15/2023 confirmed the presence of acute L2 20% inferior endplate compression fracture without displacement of the fragment.  Thoracic spine MRI revealed no fracture or disc protrusion, and normal spinal cord. Neurosurgery was consulted on

## 2023-12-23 NOTE — PROGRESS NOTES
[facility]  INPATIENT SPEECH THERAPY  [facility]  DAILY NOTE    TIME   SLP Individual Minutes  Time In: 0930  Time Out: 1000  Minutes: 30  Timed Code Treatment Minutes: 30 Minutes       Date: 2023  Patient Name: Deloris Watts      CSN: [id number]   : 1955  (68 y.o.)  Gender: female   Referring Physician:  Holly Whitaker, APRN - CNP  Diagnosis: Traumatic compression fracture of L2 lumbar vertebra, closed, initial encounter  Precautions: fall risk  Current Diet: Regular Diet with Thin Liquids  Respiratory Status: Room Air  Swallowing Strategies: Standard Universal Swallow Precautions  Date of Last MBS/FEES: Not Applicable    Pain:  No pain reported.    Subjective:  Patient seen sitting upright in recliner upon ST arrival with RNNicole, providing medication and patient care. Patient alert, pleasant, and cooperative for participation in ST interventions. No family present.    Short-Term Goals:  SHORT TERM GOAL #1:  Goal 1: Patient will complete immediate/delayed recall tasks with 70% accuracy given min cues to improve retention of novel and newly presented information  INTERVENTIONS:  24 Hour Delayed Recall of WRAP: 4/4 independent with use of written visual aid    24 Hour Delayed Recall of ST Information ([place], [place], [place]ri[place], [place], Purple): 4/5 with use of written visual aid, 1/5 with logical cuing despite use of written visual aid  *Patient with maximum cuing needed to recall and locate written visual aid    Generation and Recall of Grocery List ([place], [place]agu, Spaghetti, Garlic Bread, Salad)  Immediate Recall: 5/5 independent with written visual aid  10 Minute Delayed Recall: 5/5 independent withOUT written visual aid  15 Minute Delayed Recall: 4/5 independent with written visual aid, 1/5 with logical cuing  *Patient with excellent independent generation of grocery items  *Written visual aid remaining in patient room for use in  subsequent therapy sessions    SHORT TERM GOAL #2:  Goal 2: Patient will complete problem solving, visual/verbal reasoning, executive functioning tasks with 70% given min cues to improve return to IADLs/ADLs  INTERVENTIONS:  DNT due to focus on additional STGs.    SHORT TERM GOAL #3:  Goal 3: Patient will complete sequencing and thought organization tasks with 70% accuracy given min cues to improve mental flexibility  INTERVENTIONS:  DNT due to focus on additional STGs.    SHORT TERM GOAL #4:  Goal 4: Patient will complete sustained, selective, alternating, and divided attention tasks with no more than 5 errors/redirections in 5 minutes/task completion to improve mental focus  INTERVENTIONS:Novel card game (\"Trash\") introduced to patient this date to target complex sustained, selective, alternating, and divided attention. ST with provision of novel card game name, objective, and directions, as well as model/demonstration prior to initiation of task. Patient with the following performance during novel card game play.   Round 1: independent execution of game play with frequent checking with ST to confirm correct play; x1 redirection by ST for use of ST discard  Round 2: independent execution of game play; x1 redirection by ST for placement of card (I.e., 2 vs. Ace)  Round 3: independent execution of game play; x2 redirections by ST for placement of card and use of \"wild\" card  *Patient encouraged to re-teach ST game with at least moderate cuing; suspect patient may have difficulty with recall/explanation of novel card game in subsequent therapy session.   *Overall, good success within task    Long-Term Goals:  Time Frame for Long Term Goals: 3 weeks    LONG TERM GOAL #1:  Goal 1: Patient will improve cognitive function to a level of supervision in order to make a safe return to PLOF       Comprehension: 5 - Patient understands basic needs (hungry/hot/pain)  Expression: 6 - Device used to express complex

## 2023-12-23 NOTE — PLAN OF CARE
Problem: Safety - Adult  Goal: Free from fall injury  Outcome: Progressing     Problem: ABCDS Injury Assessment  Goal: Absence of physical injury  Outcome: Progressing     Problem: Pain  Goal: Verbalizes/displays adequate comfort level or baseline comfort level  Outcome: Progressing     Care plan reviewed with patient and verbalizes understanding of the plan of care and contribute to goal setting.

## 2023-12-23 NOTE — PROGRESS NOTES
[facility]   Nephrology progress note  12/23/2023, 12:26 PM      Pt Name:    Deloris Watts  MRN:     [id number]     YOB: 1955  Admit Date:    12/21/2023  1:13 PM    Chief Complaint: Nephrology following for ESRD and hemodialysis    Subjective:  Patient was seen and examined this morning  No chest pain or shortness of breath  Feels okay    Objective:  24HR INTAKE/OUTPUT:    Intake/Output Summary (Last 24 hours) at 12/23/2023 1226  Last data filed at 12/22/2023 2213  Gross per 24 hour   Intake 770 ml   Output 400 ml   Net 370 ml         I/O last 3 completed shifts:  In: 1370 [P.O.:960; I.V.:10]  Out: 400   No intake/output data recorded.   Admission weight: 47 kg (103 lb 9.9 oz)  Wt Readings from Last 3 Encounters:   12/23/23 54.6 kg (120 lb 5.9 oz)   12/20/23 47 kg (103 lb 9.9 oz)   12/15/23 45.4 kg (100 lb)        Vitals :   Vitals:    12/22/23 1621 12/22/23 2153 12/23/23 0618 12/23/23 0916   BP: (!) 166/83 139/65  (!) 153/71   Pulse: 83 98  (!) 107   Resp: 20 20 19   Temp: 97.8 °F (36.6 °C) 98.1 °F (36.7 °C)  98.1 °F (36.7 °C)   TempSrc:  Oral  Oral   SpO2:  100%  98%   Weight: 50.2 kg (110 lb 10.7 oz)  54.6 kg (120 lb 5.9 oz)    Height:           Physical examination  General Appearance: alert and cooperative with exam, appears comfortable, no distress  Mouth/Throat: Oral mucosa moist  Neck: No JVD  Lungs:  no use of accessory muscles  GI: soft, non-tender, no guarding  Extremities: no significant LE edema    Medications:  Infusion:    sodium chloride       Meds:    traZODone  50 mg Oral Nightly    sodium chloride flush  5-40 mL IntraVENous 2 times per day    sevelamer  800 mg Oral TID WC    senna  2 tablet Oral Nightly    polyethylene glycol  17 g Oral BID    montelukast  10 mg Oral Daily    lidocaine  1 patch TransDERmal Daily    folic acid  500 mcg Oral Daily    fluticasone  2 spray Each Nostril Daily    famotidine  10 mg Oral Daily    docusate sodium  1 enema Rectal Daily     docusate sodium  100 mg Oral BID    docosanol   Topical 5x Daily    cetirizine  5 mg Oral Daily    calcitRIOL  0.25 mcg Oral Daily    atorvastatin  10 mg Oral Nightly    amLODIPine  5 mg Oral Daily    linaclotide  290 mcg Oral QAM AC    melatonin  6 mg Oral Nightly    clopidogrel  75 mg Oral Daily     Meds prn: traMADol, sodium chloride flush, phenol, ondansetron, cyclobenzaprine, sodium chloride, acetaminophen, traZODone, bisacodyl, diclofenac sodium     Lab Data :  CBC:   Recent Labs     12/20/23  2117 12/21/23  0021 12/22/23  0628   WBC  --   --  8.4   HGB 15.3 15.3 12.6   HCT 54.5* 52.1* 41.4   PLT  --   --  170     CMP:  Recent Labs     12/20/23  2141 12/21/23  0613 12/22/23  0628    135 135   K 5.1 5.9* 5.2    103 101   CO2 23 19* 20*   BUN 27* 34* 47*   CREATININE 4.0* 4.6* 5.0*   GLUCOSE 101 158* 89   CALCIUM 10.0 9.6 9.6   PHOS  --   --  4.3     Hepatic:   Recent Labs     12/22/23  0628   LABALBU 3.3*   AST 15   ALT <5*   BILITOT 0.3   ALKPHOS 168*         Assessment and Plan:  ESRD on hemodialysis Mondays, Wednesdays and Fridays  Plan hemodialysis treatment tomorrow due to holiday schedule  No acute need for dialysis today  Mild hyperkalemia.  Monitor, will correct with hemodialysis  Status post TDC exchange  Metabolic acidosis  Chronic diastolic dysfunction  Status post fall  Secondary hyperparathyroidism  Hypertension      Vasiliy Barnard MD  [facility]    This report has been created using voice recognition software. It may contain minor errors which are inherent in voice recognition technology

## 2023-12-23 NOTE — PLAN OF CARE
Problem: Discharge Planning  Goal: Discharge to home or other facility with appropriate resources  12/23/2023 1017 by Kaya Nick RN  Outcome: Progressing  Flowsheets (Taken 12/23/2023 0916)  Discharge to home or other facility with appropriate resources:   Identify barriers to discharge with patient and caregiver   Identify discharge learning needs (meds, wound care, etc)     Problem: Safety - Adult  Goal: Free from fall injury  12/23/2023 1017 by Kaya Nick RN  Outcome: Progressing  Falling star prevention in place. Bed and chair alarms in use. Call light in reach. Purposeful hourly rounding.    Problem: Pain  Goal: Verbalizes/displays adequate comfort level or baseline comfort level  12/23/2023 1017 by Kaya Nick RN  Outcome: Progressing  Flowsheets (Taken 12/23/2023 0916)  Verbalizes/displays adequate comfort level or baseline comfort level:   Encourage patient to monitor pain and request assistance   Assess pain using appropriate pain scale   Implement non-pharmacological measures as appropriate and evaluate response   Administer analgesics based on type and severity of pain and evaluate response     Problem: Chronic Conditions and Co-morbidities  Goal: Patient's chronic conditions and co-morbidity symptoms are monitored and maintained or improved  12/23/2023 1017 by Kaya Nick RN  Outcome: Progressing  Flowsheets (Taken 12/23/2023 0916)  Care Plan - Patient's Chronic Conditions and Co-Morbidity Symptoms are Monitored and Maintained or Improved: Monitor and assess patient's chronic conditions and comorbid symptoms for stability, deterioration, or improvement     Problem: Musculoskeletal - Adult  Goal: Return ADL status to a safe level of function  12/23/2023 1017 by Kaya Nick RN  Outcome: Progressing  Patient continues with therapies and is working toward discharge goals.    Problem: Genitourinary - Adult  Goal: Absence of urinary retention  12/23/2023 1017 by

## 2023-12-23 NOTE — PROGRESS NOTES
Patient educated on how to use incentive spirometer. Patient verbalized understanding and demonstrated proper use. Emphasized importance and usage of device, with coughing and deep breathing every 4 hours while awake.

## 2023-12-23 NOTE — PROGRESS NOTES
Supervision.  To don and doff shirt   Lower Extremity Dressing: Supervision.  To don and doff depends and pants  .    BALANCE:  Standing Balance: Stand By Assistance. At RW     BED MOBILITY:  Supine to Sit: Stand By Assistance use of bedrails   Sit to Supine: Stand By Assistance use of bedrails    TRANSFERS:  Sit to Stand:  Stand By Assistance. From EOB and w/c   Stand to Sit: Stand By Assistance. To EOB and w/c     FUNCTIONAL MOBILITY:  Assistive Device: Rolling Walker  Assist Level:  Contact Guard Assistance.   Distance: To and from bathroom and around unit   Pt had no LOB and demo good posture and pace around unit      ADDITIONAL ACTIVITIES:  .Patient completed dynamic standing task that facilitated 2 hand release . Patient required CGA , and demo'ed an endurance of 2-5  minutes. Demo good  tolerance with short  rest breaks. Standing task completed to challenge endurance and balance required for ADL and IADL skills.          Modified [place] Scale:  Not Applicable    ASSESSMENT:     Activity Tolerance:  Patient tolerance of  treatment: Good treatment tolerance      Discharge Recommendations: Continue to assess pending progress and Patient would benefit from continued OT at discharge  Equipment Recommendations: Other: Pt. has reacher, shower chair, elevated toilet, RW, rollator, w/c.  Plan: Times Per Week: 5x/wk for 60 minutes  Current Treatment Recommendations: Strengthening, Balance training, Functional mobility training, Endurance training, Return to work related activity, Self-Care / ADL, Safety education & training, Neuromuscular re-education, Cognitive reorientation, Equipment evaluation, education, & procurement, Patient/Caregiver education & training, Home management training, Coordination training, Gait training, Wheelchair mobility training    Education:  Learners: Patient  ADL's and Importance of Increasing Activity    Goals  Short Term Goals  Time Frame for Short Term Goals: 1 week  Short Term Goal 1:

## 2023-12-24 LAB
ANION GAP SERPL CALC-SCNC: 13 MEQ/L (ref 8–16)
BUN SERPL-MCNC: 56 MG/DL (ref 7–22)
CALCIUM SERPL-MCNC: 9.7 MG/DL (ref 8.5–10.5)
CHLORIDE SERPL-SCNC: 103 MEQ/L (ref 98–111)
CO2 SERPL-SCNC: 25 MEQ/L (ref 23–33)
CREAT SERPL-MCNC: 5.5 MG/DL (ref 0.4–1.2)
GFR SERPL CREATININE-BSD FRML MDRD: 8 ML/MIN/1.73M2
GLUCOSE SERPL-MCNC: 85 MG/DL (ref 70–108)
POTASSIUM SERPL-SCNC: 4.6 MEQ/L (ref 3.5–5.2)
SODIUM SERPL-SCNC: 141 MEQ/L (ref 135–145)

## 2023-12-24 PROCEDURE — 99232 SBSQ HOSP IP/OBS MODERATE 35: CPT | Performed by: INTERNAL MEDICINE

## 2023-12-24 PROCEDURE — 80048 BASIC METABOLIC PNL TOTAL CA: CPT

## 2023-12-24 PROCEDURE — 97535 SELF CARE MNGMENT TRAINING: CPT

## 2023-12-24 PROCEDURE — 97530 THERAPEUTIC ACTIVITIES: CPT

## 2023-12-24 PROCEDURE — 1180000000 HC REHAB R&B

## 2023-12-24 PROCEDURE — 6370000000 HC RX 637 (ALT 250 FOR IP): Performed by: NURSE PRACTITIONER

## 2023-12-24 PROCEDURE — 6370000000 HC RX 637 (ALT 250 FOR IP): Performed by: PHYSICAL MEDICINE & REHABILITATION

## 2023-12-24 PROCEDURE — 36415 COLL VENOUS BLD VENIPUNCTURE: CPT

## 2023-12-24 PROCEDURE — 97129 THER IVNTJ 1ST 15 MIN: CPT

## 2023-12-24 PROCEDURE — 90935 HEMODIALYSIS ONE EVALUATION: CPT

## 2023-12-24 PROCEDURE — 97110 THERAPEUTIC EXERCISES: CPT

## 2023-12-24 PROCEDURE — 2580000003 HC RX 258: Performed by: NURSE PRACTITIONER

## 2023-12-24 PROCEDURE — 97116 GAIT TRAINING THERAPY: CPT

## 2023-12-24 PROCEDURE — 97130 THER IVNTJ EA ADDL 15 MIN: CPT

## 2023-12-24 RX ADMIN — MONTELUKAST SODIUM 10 MG: 10 TABLET ORAL at 08:47

## 2023-12-24 RX ADMIN — CLOPIDOGREL BISULFATE 75 MG: 75 TABLET ORAL at 08:48

## 2023-12-24 RX ADMIN — FOLIC ACID 500 MCG: 1 TABLET ORAL at 08:47

## 2023-12-24 RX ADMIN — DOCOSANOL: 100 CREAM TOPICAL at 20:53

## 2023-12-24 RX ADMIN — POLYETHYLENE GLYCOL 3350 17 G: 17 POWDER, FOR SOLUTION ORAL at 20:45

## 2023-12-24 RX ADMIN — DOCUSATE SODIUM 100 MG: 100 CAPSULE, LIQUID FILLED ORAL at 20:44

## 2023-12-24 RX ADMIN — SEVELAMER CARBONATE 800 MG: 800 TABLET, FILM COATED ORAL at 08:48

## 2023-12-24 RX ADMIN — DOCOSANOL: 100 CREAM TOPICAL at 11:13

## 2023-12-24 RX ADMIN — TRAZODONE HYDROCHLORIDE 50 MG: 50 TABLET ORAL at 20:45

## 2023-12-24 RX ADMIN — ACETAMINOPHEN 650 MG: 325 TABLET ORAL at 08:48

## 2023-12-24 RX ADMIN — SODIUM CHLORIDE, PRESERVATIVE FREE 10 ML: 5 INJECTION INTRAVENOUS at 20:52

## 2023-12-24 RX ADMIN — SEVELAMER CARBONATE 800 MG: 800 TABLET, FILM COATED ORAL at 16:38

## 2023-12-24 RX ADMIN — Medication 6 MG: at 20:52

## 2023-12-24 RX ADMIN — SEVELAMER CARBONATE 800 MG: 800 TABLET, FILM COATED ORAL at 11:13

## 2023-12-24 RX ADMIN — FAMOTIDINE 10 MG: 20 TABLET ORAL at 08:47

## 2023-12-24 RX ADMIN — CALCITRIOL CAPSULES 0.25 MCG 0.25 MCG: 0.25 CAPSULE ORAL at 08:48

## 2023-12-24 RX ADMIN — ACETAMINOPHEN 650 MG: 325 TABLET ORAL at 17:43

## 2023-12-24 RX ADMIN — SODIUM CHLORIDE, PRESERVATIVE FREE 10 ML: 5 INJECTION INTRAVENOUS at 08:50

## 2023-12-24 RX ADMIN — ATORVASTATIN CALCIUM 10 MG: 10 TABLET, FILM COATED ORAL at 20:45

## 2023-12-24 RX ADMIN — AMLODIPINE BESYLATE 5 MG: 5 TABLET ORAL at 08:47

## 2023-12-24 RX ADMIN — CETIRIZINE HYDROCHLORIDE 5 MG: 5 TABLET ORAL at 08:47

## 2023-12-24 RX ADMIN — DOCUSATE SODIUM 100 MG: 100 CAPSULE, LIQUID FILLED ORAL at 08:48

## 2023-12-24 RX ADMIN — DOCOSANOL: 100 CREAM TOPICAL at 05:32

## 2023-12-24 ASSESSMENT — PAIN DESCRIPTION - LOCATION
LOCATION: BACK
LOCATION: HEAD

## 2023-12-24 ASSESSMENT — PAIN DESCRIPTION - DESCRIPTORS: DESCRIPTORS: ACHING;DISCOMFORT

## 2023-12-24 ASSESSMENT — PAIN DESCRIPTION - ORIENTATION
ORIENTATION: MID
ORIENTATION: LOWER;MID

## 2023-12-24 ASSESSMENT — PAIN SCALES - GENERAL
PAINLEVEL_OUTOF10: 3
PAINLEVEL_OUTOF10: 6

## 2023-12-24 NOTE — PLAN OF CARE
Problem: Genitourinary - Adult  Goal: Absence of urinary retention  12/24/2023 1126 by Vrooman, Rylee, LPN  Flowsheets (Taken 12/24/2023 1123)  Absence of urinary retention: Assess patient’s ability to void and empty bladder     Problem: Metabolic/Fluid and Electrolytes - Adult  Goal: Electrolytes maintained within normal limits  12/24/2023 1126 by Vrooman, Rylee, LPN  Flowsheets (Taken 12/24/2023 1123)  Electrolytes maintained within normal limits:   Administer electrolyte replacement as ordered   Monitor labs and assess patient for signs and symptoms of electrolyte imbalances     Problem: Metabolic/Fluid and Electrolytes - Adult  Goal: Hemodynamic stability and optimal renal function maintained  12/24/2023 1126 by Vrooman, Rylee, LPN  Flowsheets (Taken 12/24/2023 1123)  Hemodynamic stability and optimal renal function maintained:   Monitor labs and assess for signs and symptoms of volume excess or deficit   Monitor intake, output and patient weight

## 2023-12-24 NOTE — PROGRESS NOTES
[facility]  INPATIENT SPEECH THERAPY  [facility]  DAILY NOTE    TIME   SLP Individual Minutes  Time In: 0800  Time Out: 0830  Minutes: 30  Timed Code Treatment Minutes: 30 Minutes       Date: 2023  Patient Name: Deloris Watts      CSN: [id number]   : 1955  (68 y.o.)  Gender: female   Referring Physician:  Holly Whitaker, APRN - CNP  Diagnosis: Traumatic compression fracture of L2 lumbar vertebra, closed, initial encounter  Precautions: fall risk  Current Diet: Regular Diet with Thin Liquids  Respiratory Status: Room Air  Swallowing Strategies: Standard Universal Swallow Precautions  Date of Last MBS/FEES: Not Applicable    Pain:  6/10 - Pain location: back    Subjective: Patient seen upright in recliner. Pleasant and cooperative.     Short-Term Goals:  SHORT TERM GOAL #1:  Goal 1: Patient will complete immediate/delayed recall tasks with 70% accuracy given min cues to improve retention of novel and newly presented information GOAL NOT MET- CONTINUE   INTERVENTIONS:  24 hour delay of self Generated Grocery List:  indep     Recall of card game/set up/rules from prior session:  Recalling name of card game: FO3   Recalling set up of game: mod cues   Recalling rules/object of game: max cues required     SHORT TERM GOAL #2:  Goal 2: Patient will complete problem solving, visual/verbal reasoning, executive functioning tasks with 70% given min cues to improve return to IADLs/ADLs GOAL NOT MET- CONTINUE   INTERVENTIONS:  Calculating bill/coin amounts: 1/6 indep, 2/6 min cues, 3/6 mod cues     SHORT TERM GOAL #3:  Goal 3: Patient will complete sequencing and thought organization tasks with 70% accuracy given min cues to improve mental flexibility GOAL NOT MET- CONTINUE   INTERVENTIONS:  Organization of calendar events: 8/10 indep, 1/10 min cues, 1/10 mod cues     SHORT TERM GOAL #4:  Goal 4: Patient will complete sustained, selective, alternating, and divided  attention tasks with no more than 5 errors/redirections in 5 minutes/task completion to improve mental focus GOAL MET- CONTINUE FOR CONSISTENCY  INTERVENTIONS:DNT d/t focus on other goals    Prior session: Novel card game (\"Trash\") introduced to patient this date to target complex sustained, selective, alternating, and divided attention. ST with provision of novel card game name, objective, and directions, as well as model/demonstration prior to initiation of task. Patient with the following performance during novel card game play.   Round 1: independent execution of game play with frequent checking with ST to confirm correct play; x1 redirection by ST for use of ST discard  Round 2: independent execution of game play; x1 redirection by ST for placement of card (I.e., 2 vs. Ace)  Round 3: independent execution of game play; x2 redirections by ST for placement of card and use of \"wild\" card  *Patient encouraged to re-teach ST game with at least moderate cuing; suspect patient may have difficulty with recall/explanation of novel card game in subsequent therapy session.   *Overall, good success within task    Long-Term Goals:  Time Frame for Long Term Goals: 3 weeks    LONG TERM GOAL #1:  Goal 1: Patient will improve cognitive function to a level of supervision in order to make a safe return to PLOF GOAL NOT MET- CONTINUE        Comprehension: 5 - Patient understands basic needs (hungry/hot/pain)  Expression: 6 - Device used to express complex ideas/needs  Social Interaction: 6 - Patient requires medication for mood and/or effect  Problem Solving: 3 - Patient solves simple/routine tasks 50%-74%  Memory: 2 - Patient remembers 25%-49% of the time    Functional Oral Intake Scale: Total Oral Intake: 7.  Total oral intake with no restrictions    EDUCATION:  Learner: Patient  Education:  Reviewed ST goals and Plan of Care and Reviewed recommendations for follow-up  Evaluation of Education: Verbalizes understanding, Needs

## 2023-12-24 NOTE — FLOWSHEET NOTE
12/24/23 1218 12/24/23 1500   Vital Signs   BP  --  (!) 168/79   Temp 97.3 °F (36.3 °C) 97.5 °F (36.4 °C)   Pulse 79 70   Respirations 18 21   SpO2 96 %  --    Weight - Scale 49.5 kg (109 lb 2 oz) 48.5 kg (106 lb 14.8 oz)   Weight Method Bed scale Bed scale   Percent Weight Change -5.53 -7.44   Post-Hemodialysis Assessment   Post-Treatment Procedures  --  Blood returned;Catheter Capped, clamped with Saline x2 ports   Machine Disinfection Process  --  Acid/Vinegar Clean;Heat Disinfect;Exterior Machine Disinfection   Blood Volume Processed (Liters)  --  50.5 L   Dialyzer Clearance  --  Lightly streaked   Duration of Treatment (minutes)  --  150 minutes   Heparin Amount Administered During Treatment (mL)  --  0 mL   Hemodialysis Intake (ml)  --  400 ml   Hemodialysis Output (ml)  --  1400 ml   NET Removed (ml)  --  1000     2.5 hour treatment completed. 1 L of fluid removal well. HD catheter port flushed, clamped and capped.  Dressing clean, dry and intact. Report given to primary RN. Treatment record printed for scanning into EMR.

## 2023-12-24 NOTE — PROGRESS NOTES
[facility]  INPATIENT PHYSICAL THERAPY  PROGRESS NOTE  [facility] - [id number]-02/002-A    Time In: 0900  Time Out: 1030  Timed Code Treatment Minutes: 90 Minutes  Minutes: 90          Date: 2023  Patient Name: Deloris Watts,  Gender:  female        MRN: [id number]  : 1955  (68 y.o.)  Referral Date : 23  Referring Practitioner: Holly Whitaker APRN - CNP  Diagnosis: Traumatic compression fracture of L2 lumbar vertebra, closed, initial encounte  Treatment Diagnosis: difficulty in walking  Additional Pertinent Hx: Per H&P:Deloris Watts is a 68 y.o. right-handed  female with history of hypertension, hyperlipidemia, hydronephrosis, fibromyalgia, irritable bowel syndrome, osteoporosis, end-stage renal disease on hemodialysis since 2023, stroke with left side weakness in , anemia, depression, anxiety, right wrist and right ankle fracture treated conservatively, right proximal humeral fracture due to fall requiring ORIF, status post right carpal tunnel release surgery, status post cervical spine surgery, status post hysterectomy and bilateral oophorectomy, hemorrhoidectomy, sinus surgery, tubal ligation, LASEK surgery, is admitted to the inpatient rehabilitation unit on 2023 for intensive inpatient rehabilitation treatment of impairment and disability secondary to fall accident resulting L2 compression fracture requiring kyphoplasty, and liver laceration.She was found to have new L2 inferior endplate fracture, and hepatic laceration/contusion.  Nephrology was consulted to continue her hemodialysis.  MRI of thoracic and lumbar spine were ordered and performed on 12/15/2023.  Lumbar spine MRI done on 12/15/2023 confirmed the presence of acute L2 20% inferior endplate compression fracture without displacement of the fragment.  Thoracic spine MRI revealed no fracture or disc protrusion, and normal spinal cord. Neurosurgery was consulted

## 2023-12-24 NOTE — PLAN OF CARE
Problem: Safety - Adult  Goal: Free from fall injury  Outcome: Progressing     Problem: ABCDS Injury Assessment  Goal: Absence of physical injury  Outcome: Progressing     Problem: Pain  Goal: Verbalizes/displays adequate comfort level or baseline comfort level  Outcome: Progressing  Flowsheets (Taken 12/23/2023 2019)  Verbalizes/displays adequate comfort level or baseline comfort level: Encourage patient to monitor pain and request assistance  Care plan reviewed with patient and verbalizes understanding of the plan of care and contribute to goal setting.

## 2023-12-24 NOTE — PROGRESS NOTES
[facility]   Nephrology progress note  12/24/2023, 12:57 PM      Pt Name:    Deloris Watts  MRN:     [id number]     YOB: 1955  Admit Date:    12/21/2023  1:13 PM    Chief Complaint: Nephrology following for ESRD and hemodialysis    Subjective:  Patient was seen and examined this morning  No chest pain or shortness of breath  Feels okay    Objective:  24HR INTAKE/OUTPUT:    Intake/Output Summary (Last 24 hours) at 12/24/2023 1257  Last data filed at 12/24/2023 1101  Gross per 24 hour   Intake 610 ml   Output --   Net 610 ml           I/O last 3 completed shifts:  In: 930 [P.O.:920; I.V.:10]  Out: -   I/O this shift:  In: 250 [P.O.:240; I.V.:10]  Out: -    Admission weight: 47 kg (103 lb 9.9 oz)  Wt Readings from Last 3 Encounters:   12/24/23 52.4 kg (115 lb 8.3 oz)   12/20/23 47 kg (103 lb 9.9 oz)   12/15/23 45.4 kg (100 lb)        Vitals :   Vitals:    12/24/23 0841 12/24/23 0843 12/24/23 1115 12/24/23 1218   BP: (!) 138/119 (!) 170/73 (!) 153/79 (!) 184/85   Pulse: 78   79   Resp: 18   18   Temp: 97.1 °F (36.2 °C)   97.3 °F (36.3 °C)   TempSrc: Oral      SpO2: 96%   96%   Weight:    52.4 kg (115 lb 8.3 oz)   Height:           Physical examination  General Appearance: alert and cooperative with exam, appears comfortable, no distress  Mouth/Throat: Oral mucosa moist  Neck: No JVD  Lungs:  no use of accessory muscles  GI: soft, non-tender, no guarding  Extremities: no significant LE edema    Medications:  Infusion:    sodium chloride       Meds:    traZODone  50 mg Oral Nightly    sodium chloride flush  5-40 mL IntraVENous 2 times per day    sevelamer  800 mg Oral TID WC    senna  2 tablet Oral Nightly    polyethylene glycol  17 g Oral BID    montelukast  10 mg Oral Daily    lidocaine  1 patch TransDERmal Daily    folic acid  500 mcg Oral Daily    fluticasone  2 spray Each Nostril Daily    famotidine  10 mg Oral Daily    docusate sodium  1 enema Rectal Daily    docusate sodium  100

## 2023-12-24 NOTE — CARE COORDINATION
Patient reminded to use her incentive spirometry while awake. She had no complaints of pain. She has slept throughout the evening.

## 2023-12-24 NOTE — PROGRESS NOTES
[facility]  Inpatient Rehabilitation  Occupational Therapy  Progress Note  Time:  Time In: 0700  Time Out: 0800  Timed Code Treatment Minutes: 60 Minutes  Minutes: 60          Date: 2023  Patient Name: Deloris Watts,   Gender: female      Room: [facility]/002-A  MRN: [id number]  : 1955  (68 y.o.)  Referring Practitioner: ordering:  Holly Whitaker APRN - CNP, attending: Jax Whatley MD  Diagnosis: traumatic compression fracture of L2 lumbar vertebra, closed initial encounter  Additional Pertinent Hx: Deloris is a 68 year old female whom presented to [facility] ED on 12/15/23 s/p fall on 23.  It is noted that pt has demonstrated reduced balance.  CT imaging of brain completed with no abnormalities, CT of cervical spine indicated fusion at C5-7 with no new abnormalities, CT of lumbar spine shows new inferior endplate fx at L2 and multiple levels of foraminal stenosis.  CT of abdomen/pelvis noted suggestive hepatic laceration/contusion.  It is noted pt has mild TTP of abdomen.  Pt. Underwent an L2 kyphoplasty on 23.  Pt. transferred to the IPR unit on 23 for further medical care and referred to [facility] OT services.    Restrictions/Precautions:  Restrictions/Precautions: Fall Risk, General Precautions  Position Activity Restriction  Other position/activity restrictions: Pt. s/p kyphoplasty, dialysis MWF with port in R chest region    SUBJECTIVE: pt lying in bed sleeping when arrived. Pt agreeable to OT     PAIN: lower back pain     Vitals: Vitals not assessed per clinical judgement, see nursing flowsheet    COGNITION: Decreased Problem Solving and Decreased Safety Awareness    ADL:   Feeding: Independent.     Grooming: Stand By Assistance.  To stand / sit at sink to complete oral care, hair and putting on makeup   Bathing: Contact Guard Assistance.  Able to complete all care   Upper Extremity Dressing: Supervision.  Able to don and doff shirt   Lower  activity, Self-Care / ADL, Safety education & training, Neuromuscular re-education, Cognitive reorientation, Equipment evaluation, education, & procurement, Patient/Caregiver education & training, Home management training, Coordination training, Gait training, Wheelchair mobility training    Education:  Learners: Patient  ADL's and Importance of Increasing Activity    Goals  Short Term Goals  Time Frame for Short Term Goals: 1 week  Short Term Goal 1: Pt. to retrieve/transport at least 3 items SBA in prep for self care regimen. GOAL NOT MET/CONTINUE   Short Term Goal 2: Pt. to demo SBA during standing tasks up to 5 min 1-2 hand release to improve activity tolerance with ADLs. GOAL MET/ REVISE   Short Term Goal 3: Pt. to recall at least 3 fall prevention techniques with good carryover during daily occupations to prevent further falls from occurring. GOAL NOT MET/CONTINUE   Long Term Goals  Time Frame for Long Term Goals : 2 weeks from OT eval on IPR unit  Long Term Goal 1: Pt. to demo Mod I with self care regimen (dressing, sponge bathing, grooming, toileting) with good safety to progress to PLOF. GOAL NOT MET/CONTINUE   Long Term Goal 2: Pt to demo Mod I with toilet transfers with 0 cues for proper technique to maximize independence with ADLs. GOAL NOT MET/CONTINUE   Long Term Goal 3: Pt. to demo Supervision with light IADLs (simple snack prep, bed making, laundry) to advance engagement with daily occupations.  GOAL NOT MET/CONTINUE     Following session, patient left in safe position with all fall risk precautions in place.

## 2023-12-25 LAB
ANION GAP SERPL CALC-SCNC: 13 MEQ/L (ref 8–16)
BUN SERPL-MCNC: 39 MG/DL (ref 7–22)
CALCIUM SERPL-MCNC: 9.7 MG/DL (ref 8.5–10.5)
CHLORIDE SERPL-SCNC: 104 MEQ/L (ref 98–111)
CO2 SERPL-SCNC: 24 MEQ/L (ref 23–33)
CREAT SERPL-MCNC: 4.4 MG/DL (ref 0.4–1.2)
GFR SERPL CREATININE-BSD FRML MDRD: 10 ML/MIN/1.73M2
GLUCOSE SERPL-MCNC: 86 MG/DL (ref 70–108)
POTASSIUM SERPL-SCNC: 4.4 MEQ/L (ref 3.5–5.2)
SODIUM SERPL-SCNC: 141 MEQ/L (ref 135–145)

## 2023-12-25 PROCEDURE — 6370000000 HC RX 637 (ALT 250 FOR IP): Performed by: NURSE PRACTITIONER

## 2023-12-25 PROCEDURE — 2580000003 HC RX 258: Performed by: NURSE PRACTITIONER

## 2023-12-25 PROCEDURE — 99232 SBSQ HOSP IP/OBS MODERATE 35: CPT | Performed by: INTERNAL MEDICINE

## 2023-12-25 PROCEDURE — 36415 COLL VENOUS BLD VENIPUNCTURE: CPT

## 2023-12-25 PROCEDURE — 80048 BASIC METABOLIC PNL TOTAL CA: CPT

## 2023-12-25 PROCEDURE — 1180000000 HC REHAB R&B

## 2023-12-25 PROCEDURE — 6370000000 HC RX 637 (ALT 250 FOR IP): Performed by: PHYSICAL MEDICINE & REHABILITATION

## 2023-12-25 RX ADMIN — AMLODIPINE BESYLATE 5 MG: 5 TABLET ORAL at 09:15

## 2023-12-25 RX ADMIN — FAMOTIDINE 10 MG: 20 TABLET ORAL at 09:16

## 2023-12-25 RX ADMIN — DOCUSATE SODIUM 100 MG: 100 CAPSULE, LIQUID FILLED ORAL at 09:16

## 2023-12-25 RX ADMIN — CLOPIDOGREL BISULFATE 75 MG: 75 TABLET ORAL at 09:16

## 2023-12-25 RX ADMIN — SENNOSIDES 17.2 MG: 8.6 TABLET, FILM COATED ORAL at 20:31

## 2023-12-25 RX ADMIN — Medication 6 MG: at 20:30

## 2023-12-25 RX ADMIN — TRAMADOL HYDROCHLORIDE 50 MG: 50 TABLET, COATED ORAL at 09:16

## 2023-12-25 RX ADMIN — SEVELAMER CARBONATE 800 MG: 800 TABLET, FILM COATED ORAL at 11:45

## 2023-12-25 RX ADMIN — DOCOSANOL: 100 CREAM TOPICAL at 17:06

## 2023-12-25 RX ADMIN — ATORVASTATIN CALCIUM 10 MG: 10 TABLET, FILM COATED ORAL at 20:31

## 2023-12-25 RX ADMIN — SEVELAMER CARBONATE 800 MG: 800 TABLET, FILM COATED ORAL at 09:15

## 2023-12-25 RX ADMIN — DOCOSANOL: 100 CREAM TOPICAL at 05:25

## 2023-12-25 RX ADMIN — TRAZODONE HYDROCHLORIDE 50 MG: 50 TABLET ORAL at 20:30

## 2023-12-25 RX ADMIN — CETIRIZINE HYDROCHLORIDE 5 MG: 5 TABLET ORAL at 09:15

## 2023-12-25 RX ADMIN — DOCUSATE SODIUM 100 MG: 100 CAPSULE, LIQUID FILLED ORAL at 20:31

## 2023-12-25 RX ADMIN — DOCOSANOL: 100 CREAM TOPICAL at 11:45

## 2023-12-25 RX ADMIN — MONTELUKAST SODIUM 10 MG: 10 TABLET ORAL at 09:15

## 2023-12-25 RX ADMIN — SEVELAMER CARBONATE 800 MG: 800 TABLET, FILM COATED ORAL at 17:06

## 2023-12-25 RX ADMIN — SODIUM CHLORIDE, PRESERVATIVE FREE 10 ML: 5 INJECTION INTRAVENOUS at 09:23

## 2023-12-25 RX ADMIN — FLUTICASONE PROPIONATE 2 SPRAY: 50 SPRAY, METERED NASAL at 09:18

## 2023-12-25 RX ADMIN — FOLIC ACID 500 MCG: 1 TABLET ORAL at 09:15

## 2023-12-25 RX ADMIN — CALCITRIOL CAPSULES 0.25 MCG 0.25 MCG: 0.25 CAPSULE ORAL at 09:15

## 2023-12-25 ASSESSMENT — PAIN DESCRIPTION - DESCRIPTORS: DESCRIPTORS: ACHING

## 2023-12-25 ASSESSMENT — PAIN DESCRIPTION - ORIENTATION: ORIENTATION: MID;LOWER

## 2023-12-25 ASSESSMENT — PAIN SCALES - GENERAL
PAINLEVEL_OUTOF10: 8
PAINLEVEL_OUTOF10: 2
PAINLEVEL_OUTOF10: 0

## 2023-12-25 ASSESSMENT — PAIN DESCRIPTION - LOCATION: LOCATION: BACK

## 2023-12-25 NOTE — PROGRESS NOTES
[facility]   Nephrology progress note  12/25/2023, 12:48 PM      Pt Name:    Deloris Watts  MRN:     [id number]     YOB: 1955  Admit Date:    12/21/2023  1:13 PM    Chief Complaint: Nephrology following for ESRD and hemodialysis    Subjective:  Patient was seen and examined this morning  No chest pain or shortness of breath  Feels okay    Objective:  24HR INTAKE/OUTPUT:    Intake/Output Summary (Last 24 hours) at 12/25/2023 1248  Last data filed at 12/25/2023 0923  Gross per 24 hour   Intake 905 ml   Output 1400 ml   Net -495 ml           I/O last 3 completed shifts:  In: 1270 [P.O.:860; I.V.:10]  Out: 1400   I/O this shift:  In: 5 [I.V.:5]  Out: -    Admission weight: 47 kg (103 lb 9.9 oz)  Wt Readings from Last 3 Encounters:   12/25/23 48.5 kg (106 lb 14.8 oz)   12/20/23 47 kg (103 lb 9.9 oz)   12/15/23 45.4 kg (100 lb)        Vitals :   Vitals:    12/25/23 0911 12/25/23 0915 12/25/23 0946 12/25/23 1055   BP: (!) 154/79   (!) 154/79   Pulse: 68   68   Resp: 19 19 18 19   Temp:    97.8 °F (36.6 °C)   TempSrc:    Oral   SpO2: 98%      Weight:       Height:           Physical examination  General Appearance: alert and cooperative with exam, appears comfortable, no distress  Mouth/Throat: Oral mucosa moist  Neck: No JVD  Lungs:  no use of accessory muscles  GI: soft, non-tender, no guarding  Extremities: no significant LE edema    Medications:  Infusion:    sodium chloride       Meds:    traZODone  50 mg Oral Nightly    sodium chloride flush  5-40 mL IntraVENous 2 times per day    sevelamer  800 mg Oral TID WC    senna  2 tablet Oral Nightly    polyethylene glycol  17 g Oral BID    montelukast  10 mg Oral Daily    lidocaine  1 patch TransDERmal Daily    folic acid  500 mcg Oral Daily    fluticasone  2 spray Each Nostril Daily    famotidine  10 mg Oral Daily    docusate sodium  1 enema Rectal Daily    docusate sodium  100 mg Oral BID    docosanol   Topical 5x Daily    cetirizine  5

## 2023-12-25 NOTE — PLAN OF CARE
Individualized Plan of Care  [facility] Inpatient Rehabilitation Unit    Rehabilitation physician: Dr. Whatley    Admit Date: 12/21/2023     Rehabilitation Diagnosis: Traumatic compression fracture of L2 lumbar vertebra, closed, initial encounter ([facility]) [S32.020A]      Rehabilitation impairments: self care, mobility, bowel/bladder management, pain management, safety, and cognitive function    Factors facilitating achievement of predicted outcomes: Family support, Motivated, Cooperative, and Pleasant  Barriers to the achievement of predicted outcomes: Pain, Limited family support, Limited safety awareness, Limited insight into deficits, Decreased endurance, Upper extremity weakness, Lower extremity weakness, Medical complications, Stairs at home, Skin Care, Wound Care, and on hemodialysis    Patient Goals: Improve independence with mobility, Improvement of mobility at a wheelchair level, Increase overall strength and endurance, Increase balance, Increase endurance, Increase independence with activities of daily living, Improve cognition, Increase self-awareness, Increase safety awareness, Increase community integration, Increase socialization, Functional communication with caregivers, Integrate appropriate pain management plan, Assure adequate nutritional option for discharge, Continence of bowel and bladder, and Provide appropriate patient and family education      NURSING:  Nursing goals for Deloris Watts while on the rehabilitation unit will include:  Continence of bowel and bladder, Adequate number of bowel movements, Urinate with no urinary retention >300ml in bladder, Maintain O2 SATs at an acceptable level during stay, Effective pain management while on the rehabilitation unit, Establish adequate pain control plan for discharge, Absence of skin breakdown while on the rehabilitation unit, Improved skin integrity via assessments including wound measurements, Avoidance of any hospital acquired  training, equipment evaluation/ training/procurement, neuromuscular reeducation, wheelchair mobility training.      SPEECH THERAPY:   Short Term Goals  Time Frame for Short Term Goals: 1 week  Goal 1: Patient will complete immediate/delayed recall tasks with 70% accuracy given min cues to improve retention of novel and newly presented information  Goal 2: Patient will complete problem solving, visual/verbal reasoning, executive functioning tasks with 70% given min cues to improve return to IADLs/ADLs  Goal 3: Patient will complete sequencing and thought organization tasks with 70% accuracy given min cues to improve mental flexibility  Goal 4: Patient will complete sustained, selective, alternating, and divided attention tasks with no more than 5 errors/redirections in 5 minutes/task completion to improve mental focus    Plan of Care: Pt to be seen by speech therapy services 30 minutes 5 times per week.                                                                                                                              Anticipated interventions may include speech/language/communication therapy, cognitive training, group therapy, education, and/or dysphagia therapy based on the above goals.     CASE MANAGEMENT:  Goals:   Assist patient/family with discharge planning, patient/family counseling,  and coordination with insurance during the inpatient rehabilitation stay.         Other members of the multidisciplinary rehabilitation team that will be involved in the patient's plan of care include recreational therapy, dietary, respiratory therapy, and neuropsychology.    Medical issues being managed closely and that require 24 hour availability of a physician:  Bowel/Bladder function, Weight bearing precautions, Wound care, Pain management, Infection protection, DVT prophylaxis, Fall precautions, Fluid/Electrolyte balance, Nutritional status, and Anemia                                           Physician anticipated

## 2023-12-25 NOTE — PLAN OF CARE
Problem: Discharge Planning  Goal: Discharge to home or other facility with appropriate resources  12/24/2023 2311 by Jasmine Ware RN  Outcome: Progressing  Flowsheets (Taken 12/24/2023 2030)  Discharge to home or other facility with appropriate resources: Identify barriers to discharge with patient and caregiver     Problem: Safety - Adult  Goal: Free from fall injury  12/24/2023 2311 by Jasmine Ware, RN  Outcome: Progressing     Problem: ABCDS Injury Assessment  Goal: Absence of physical injury  12/24/2023 2311 by Jasmine Ware RN  Outcome: Progressing     Problem: Pain  Goal: Verbalizes/displays adequate comfort level or baseline comfort level  12/24/2023 2311 by Jasmine Ware RN  Outcome: Progressing     Problem: Chronic Conditions and Co-morbidities  Goal: Patient's chronic conditions and co-morbidity symptoms are monitored and maintained or improved  12/24/2023 2311 by Jasmine Ware RN  Outcome: Progressing  Flowsheets (Taken 12/24/2023 2030)  Care Plan - Patient's Chronic Conditions and Co-Morbidity Symptoms are Monitored and Maintained or Improved: Monitor and assess patient's chronic conditions and comorbid symptoms for stability, deterioration, or improvement     Problem: Musculoskeletal - Adult  Goal: Return ADL status to a safe level of function  12/24/2023 2311 by Jasmine Ware RN  Outcome: Progressing  Flowsheets (Taken 12/24/2023 2030)  Return ADL Status to a Safe Level of Function: Administer medication as ordered

## 2023-12-25 NOTE — PROGRESS NOTES
the presence of acute L2 20% inferior endplate compression fracture without displacement of the fragment.  Thoracic spine MRI revealed no fracture or disc protrusion, and normal spinal cord. Neurosurgery was consulted on 12/16/2023 for L2 compression fracture and kyphoplasty was planned.  On 12/18/2023 the patient underwent L2 kyphoplasty for pathological L2 compression fracture by Dr. Nimisha Garza.  PM&R was consulted on 12/19/2023 and intensive inpatient rehab treatment was recommended.  The patient's hospital course also was complicated by constipation requiring digital disimpaction on 12/21/2023.  Her hospital course also was complicated by difficulty encountered in hemodialysis.  However interventional radiology central venous catheter check was done on 12/21/2023 and showed no evidence of fibrin sheath and normally functioning catheter lumens.    The patient had her hemodialysis catheter replaced by interventional radiologist on 12/22/2023.  Hemodialysis was able to carry out without difficulty afterward.  She had last hemodialysis done on 12/24/2023.  She says she feels well today.  She says her right side but her back area feel sore and achy especially when she is standing and walking.  She denies having any other painful symptom.  She still has fatigue with mild generalized weakness.  She denies having headache, dizziness, lightheadedness, nauseous feeling, shortness of breath, chest pain, abdominal pain, constipation or diarrhea.  Nephrology service continue following the patient to manage her hemodialysis.  She says she is tolerating the intensive rehabilitation treatment well.    The patient's case is discussed in multidisciplinary care conference today.  She is projected to be ready for discharge on 1/5/2024.      Rehabilitation:  PT: Reviewed.    Bed Mobility:  Supine to Sit: Contact Guard Assistance, with head of bed flat, without rail, with verbal cues   Sit to Supine: Contact Guard Assistance, with  6.0 (HH)   Anion Gap 8.0 - 16.0 meq/L 13.0 13.0 11.0   Est, Glom Filt Rate >60 ml/min/1.73m2 8 ! 10 ! 7 !   Glucose, Random 70 - 108 mg/dL 85 86 88   CALCIUM, SERUM, [id number] 8.5 - 10.5 mg/dL 9.7 9.7 9.8   (HH): Data is critically high  (L): Data is abnormally low  (H): Data is abnormally high  !: Data is abnormal      Impression:  Status post fall resulting  Lumbar spine contusion resulting lumbar spine sprain and muscular strain with acute L2 inferior endplate pathological (due to osteoporosis) compression fracture requiring kyphoplasty  Liver laceration/contusion  History of stroke with left hemiparesis  End-stage renal disease requiring hemodialysis  Nonfunctioning hemodialysis catheter   Hyperkalemia  Hypertension  Irritable bowel syndrome  Hyperlipidemia  Osteoporosis  History of hydronephrosis  History of fibromyalgia  History of depression and anxiety  History of right proximal humerus fracture requiring ORIF  History of right wrist fracture requiring casting  History of right ankle fracture requiring casting    The patient's condition remains stable.  The patient today still complains of pain at the right side lower back and right buttock area.  We can try Voltaren gel application to the painful area.  Her hemodialysis now goes more smoothly after the hemodialysis catheter was replaced on 12/22/2023.  She still has fatigue with generalized weakness.  She tolerated the rehab treatment well over the long weekend.  Her function is improving slowly.    The patient currently is projected to be ready for discharge on 1/5/2024.      Plan:  Continue intensive PT/OT/SLP/RT inpatient rehabilitation program at least 3 hours per day, 5 days per week in order to improve functional status prior to discharge.  Family education and training will be completed.  Equipment evaluations and recommendations will be completed as appropriate.       Rehabilitation nursing continue to be involved for bowel, bladder, skin, and pain

## 2023-12-26 LAB
ANION GAP SERPL CALC-SCNC: 11 MEQ/L (ref 8–16)
BUN SERPL-MCNC: 53 MG/DL (ref 7–22)
CALCIUM SERPL-MCNC: 9.8 MG/DL (ref 8.5–10.5)
CHLORIDE SERPL-SCNC: 107 MEQ/L (ref 98–111)
CO2 SERPL-SCNC: 22 MEQ/L (ref 23–33)
CREAT SERPL-MCNC: 6 MG/DL (ref 0.4–1.2)
DEPRECATED RDW RBC AUTO: 48.6 FL (ref 35–45)
ERYTHROCYTE [DISTWIDTH] IN BLOOD BY AUTOMATED COUNT: 14.6 % (ref 11.5–14.5)
GFR SERPL CREATININE-BSD FRML MDRD: 7 ML/MIN/1.73M2
GLUCOSE SERPL-MCNC: 88 MG/DL (ref 70–108)
HCT VFR BLD AUTO: 33.8 % (ref 37–47)
HGB BLD-MCNC: 10.5 GM/DL (ref 12–16)
MCH RBC QN AUTO: 28.5 PG (ref 26–33)
MCHC RBC AUTO-ENTMCNC: 31.1 GM/DL (ref 32.2–35.5)
MCV RBC AUTO: 91.8 FL (ref 81–99)
PLATELET # BLD AUTO: 233 THOU/MM3 (ref 130–400)
PMV BLD AUTO: 10.2 FL (ref 9.4–12.4)
POTASSIUM SERPL-SCNC: 4.3 MEQ/L (ref 3.5–5.2)
RBC # BLD AUTO: 3.68 MILL/MM3 (ref 4.2–5.4)
SODIUM SERPL-SCNC: 140 MEQ/L (ref 135–145)
WBC # BLD AUTO: 6.9 THOU/MM3 (ref 4.8–10.8)

## 2023-12-26 PROCEDURE — 6370000000 HC RX 637 (ALT 250 FOR IP): Performed by: PHYSICAL MEDICINE & REHABILITATION

## 2023-12-26 PROCEDURE — 1180000000 HC REHAB R&B

## 2023-12-26 PROCEDURE — 97116 GAIT TRAINING THERAPY: CPT

## 2023-12-26 PROCEDURE — 36415 COLL VENOUS BLD VENIPUNCTURE: CPT

## 2023-12-26 PROCEDURE — 6370000000 HC RX 637 (ALT 250 FOR IP): Performed by: NURSE PRACTITIONER

## 2023-12-26 PROCEDURE — 97530 THERAPEUTIC ACTIVITIES: CPT

## 2023-12-26 PROCEDURE — 85027 COMPLETE CBC AUTOMATED: CPT

## 2023-12-26 PROCEDURE — 99232 SBSQ HOSP IP/OBS MODERATE 35: CPT | Performed by: INTERNAL MEDICINE

## 2023-12-26 PROCEDURE — 97535 SELF CARE MNGMENT TRAINING: CPT

## 2023-12-26 PROCEDURE — 97110 THERAPEUTIC EXERCISES: CPT

## 2023-12-26 PROCEDURE — 99232 SBSQ HOSP IP/OBS MODERATE 35: CPT | Performed by: PHYSICAL MEDICINE & REHABILITATION

## 2023-12-26 PROCEDURE — 80048 BASIC METABOLIC PNL TOTAL CA: CPT

## 2023-12-26 PROCEDURE — 97129 THER IVNTJ 1ST 15 MIN: CPT

## 2023-12-26 PROCEDURE — 97130 THER IVNTJ EA ADDL 15 MIN: CPT

## 2023-12-26 RX ADMIN — TRAZODONE HYDROCHLORIDE 50 MG: 50 TABLET ORAL at 21:21

## 2023-12-26 RX ADMIN — DICLOFENAC SODIUM 2 G: 10 GEL TOPICAL at 12:32

## 2023-12-26 RX ADMIN — CALCITRIOL CAPSULES 0.25 MCG 0.25 MCG: 0.25 CAPSULE ORAL at 07:53

## 2023-12-26 RX ADMIN — FAMOTIDINE 10 MG: 20 TABLET ORAL at 07:53

## 2023-12-26 RX ADMIN — CETIRIZINE HYDROCHLORIDE 5 MG: 5 TABLET ORAL at 07:53

## 2023-12-26 RX ADMIN — FOLIC ACID 500 MCG: 1 TABLET ORAL at 07:53

## 2023-12-26 RX ADMIN — CLOPIDOGREL BISULFATE 75 MG: 75 TABLET ORAL at 07:53

## 2023-12-26 RX ADMIN — TRAMADOL HYDROCHLORIDE 50 MG: 50 TABLET, COATED ORAL at 21:11

## 2023-12-26 RX ADMIN — AMLODIPINE BESYLATE 5 MG: 5 TABLET ORAL at 07:53

## 2023-12-26 RX ADMIN — Medication 6 MG: at 21:13

## 2023-12-26 RX ADMIN — DOCUSATE SODIUM 100 MG: 100 CAPSULE, LIQUID FILLED ORAL at 07:53

## 2023-12-26 RX ADMIN — POLYETHYLENE GLYCOL 3350 17 G: 17 POWDER, FOR SOLUTION ORAL at 21:17

## 2023-12-26 RX ADMIN — ATORVASTATIN CALCIUM 10 MG: 10 TABLET, FILM COATED ORAL at 21:21

## 2023-12-26 RX ADMIN — MONTELUKAST SODIUM 10 MG: 10 TABLET ORAL at 07:53

## 2023-12-26 RX ADMIN — DICLOFENAC SODIUM 2 G: 10 GEL TOPICAL at 17:32

## 2023-12-26 RX ADMIN — DICLOFENAC SODIUM 2 G: 10 GEL TOPICAL at 21:22

## 2023-12-26 RX ADMIN — SEVELAMER CARBONATE 800 MG: 800 TABLET, FILM COATED ORAL at 12:31

## 2023-12-26 RX ADMIN — SEVELAMER CARBONATE 800 MG: 800 TABLET, FILM COATED ORAL at 17:31

## 2023-12-26 RX ADMIN — SEVELAMER CARBONATE 800 MG: 800 TABLET, FILM COATED ORAL at 07:53

## 2023-12-26 RX ADMIN — SENNOSIDES 17.2 MG: 8.6 TABLET, FILM COATED ORAL at 21:11

## 2023-12-26 RX ADMIN — FLUTICASONE PROPIONATE 2 SPRAY: 50 SPRAY, METERED NASAL at 07:53

## 2023-12-26 RX ADMIN — DOCUSATE SODIUM 100 MG: 100 CAPSULE, LIQUID FILLED ORAL at 21:11

## 2023-12-26 ASSESSMENT — PAIN SCALES - GENERAL: PAINLEVEL_OUTOF10: 5

## 2023-12-26 NOTE — PLAN OF CARE
Problem: Safety - Adult  Goal: Free from fall injury  Outcome: Progressing     Problem: Pain  Goal: Verbalizes/displays adequate comfort level or baseline comfort level  Outcome: Progressing     Problem: ABCDS Injury Assessment  Goal: Absence of physical injury  Outcome: Progressing     Care plan reviewed with patient and  verbalizes understanding of the plan of care and contribute to goal setting.

## 2023-12-26 NOTE — PROGRESS NOTES
[facility]  INPATIENT SPEECH THERAPY  [facility]  DAILY NOTE    TIME   SLP Individual Minutes  Time In: 1030  Time Out: 1100  Minutes: 30  Timed Code Treatment Minutes: 30 Minutes       Date: 2023  Patient Name: Deloris Watts      CSN: [id number]   : 1955  (68 y.o.)  Gender: female   Referring Physician:  Holly Whitaker, APRN - CNP  Diagnosis: Traumatic compression fracture of L2 lumbar vertebra, closed, initial encounter  Precautions: fall risk  Current Diet: Regular Diet with Thin Liquids  Respiratory Status: Room Air  Swallowing Strategies: Standard Universal Swallow Precautions  Date of Last MBS/FEES: Not Applicable    Pain:  No pain reported.    Subjective: Patient seen resting in bed upon ST arrival; alert, pleasant, and cooperative throughout. Patient with inquiry re: today's schedule as family requesting to come visit after therapy; ST with assistance with schedule interpretation. Patient's  entered midway through session with patient attempting to initiate conversation and  providing redirection to return to therapy task.     Short-Term Goals:  SHORT TERM GOAL #1:  Goal 1: Patient will complete immediate/delayed recall tasks with 70% accuracy given min cues to improve retention of novel and newly presented information.   INTERVENTIONS:  Recall of Personal Appointment (Tuesday, 2024 with Dr. Rich, 10:30am, [facility] (pre-op))  Immediate Recall: 6/6 independent  10 Minute Delayed Recall: 4/6 independent, 2/6 with logical cuing  20 Minute Delayed Recall: 4/6 independent, 1/6 with logical cuing, 1/6 with written visual aid  *Excellent recall without written visual aid    SHORT TERM GOAL #2:  Goal 2: Patient will complete problem solving, visual/verbal reasoning, executive functioning tasks with 70% given min cues to improve return to IADLs/ADLs.  INTERVENTIONS:     Medication Problem Solvin/6 independent, 1/6 with logical

## 2023-12-26 NOTE — PLAN OF CARE
Problem: Discharge Planning  Goal: Discharge to home or other facility with appropriate resources  12/26/2023 1205 by Kusum Guajardo LISW  Note: Team conference held Tuesday, 12/26. Recommendations of the team were explained to the patient by Dr Whatley and VARSHA. Team is recommending that patient continue on acute inpatient rehab for PT, OT and ST, with expected discharge date of Friday, 1/5. Following discharge, team is recommending Outpatient PT. Care plan reviewed with patient.  Patient verbalized understanding of the plan of care and contributed to goal setting.     SW met with patient and Tenzin late in the day to discuss team conference. SW updated Tenzin on the plan. Tenzin is in agreement with this plan. Patient prefers outpatient therapy at the [facility].    VARSHA spoke with Sera at [facility] to update her on patient's discharge.    SW to follow and maintain involvement in discharge planning.

## 2023-12-26 NOTE — PROGRESS NOTES
[facility]  INPATIENT PHYSICAL THERAPY  DAILY NOTE  [facility] - [id number]-02/002-A    Time In: 1330  Time Out: 1430  Timed Code Treatment Minutes: 60 Minutes  Minutes: 60          Date: 2023  Patient Name: Deloris Watts,  Gender:  female        MRN: [id number]  : 1955  (68 y.o.)  Referral Date : 23  Referring Practitioner: Holly Whitaker APRN - CNP  Diagnosis: Traumatic compression fracture of L2 lumbar vertebra, closed, initial encounte  Treatment Diagnosis: difficulty in walking  Additional Pertinent Hx: Per H&P:Deloris Watts is a 68 y.o. right-handed  female with history of hypertension, hyperlipidemia, hydronephrosis, fibromyalgia, irritable bowel syndrome, osteoporosis, end-stage renal disease on hemodialysis since 2023, stroke with left side weakness in , anemia, depression, anxiety, right wrist and right ankle fracture treated conservatively, right proximal humeral fracture due to fall requiring ORIF, status post right carpal tunnel release surgery, status post cervical spine surgery, status post hysterectomy and bilateral oophorectomy, hemorrhoidectomy, sinus surgery, tubal ligation, LASEK surgery, is admitted to the inpatient rehabilitation unit on 2023 for intensive inpatient rehabilitation treatment of impairment and disability secondary to fall accident resulting L2 compression fracture requiring kyphoplasty, and liver laceration.She was found to have new L2 inferior endplate fracture, and hepatic laceration/contusion.  Nephrology was consulted to continue her hemodialysis.  MRI of thoracic and lumbar spine were ordered and performed on 12/15/2023.  Lumbar spine MRI done on 12/15/2023 confirmed the presence of acute L2 20% inferior endplate compression fracture without displacement of the fragment.  Thoracic spine MRI revealed no fracture or disc protrusion, and normal spinal cord. Neurosurgery was consulted on

## 2023-12-26 NOTE — PROGRESS NOTES
[facility]  INPATIENT PHYSICAL THERAPY  [facility] - [id number]-02/[id number]  Daily Note    Time In: 0800  Time Out: 0830  Timed Code Treatment Minutes: 30 Minutes  Minutes: 30          Date: 2023  Patient Name: Deloris Watts,  Gender:  female        MRN: [id number]  : 1955  (68 y.o.)  Referral Date : 23  Referring Practitioner: Holly Whitaker APRN - CNP  Diagnosis: Traumatic compression fracture of L2 lumbar vertebra, closed, initial encounte  Treatment Diagnosis: difficulty in walking  Additional Pertinent Hx: Per H&P:Deloris Watts is a 68 y.o. right-handed  female with history of hypertension, hyperlipidemia, hydronephrosis, fibromyalgia, irritable bowel syndrome, osteoporosis, end-stage renal disease on hemodialysis since 2023, stroke with left side weakness in , anemia, depression, anxiety, right wrist and right ankle fracture treated conservatively, right proximal humeral fracture due to fall requiring ORIF, status post right carpal tunnel release surgery, status post cervical spine surgery, status post hysterectomy and bilateral oophorectomy, hemorrhoidectomy, sinus surgery, tubal ligation, LASEK surgery, is admitted to the inpatient rehabilitation unit on 2023 for intensive inpatient rehabilitation treatment of impairment and disability secondary to fall accident resulting L2 compression fracture requiring kyphoplasty, and liver laceration.She was found to have new L2 inferior endplate fracture, and hepatic laceration/contusion.  Nephrology was consulted to continue her hemodialysis.  MRI of thoracic and lumbar spine were ordered and performed on 12/15/2023.  Lumbar spine MRI done on 12/15/2023 confirmed the presence of acute L2 20% inferior endplate compression fracture without displacement of the fragment.  Thoracic spine MRI revealed no fracture or disc protrusion, and normal spinal cord. Neurosurgery was consulted on  12/16/2023 for L2 compression fracture and kyphoplasty was planned.  On 12/18/2023 the patient underwent L2 kyphoplasty for pathological L2 compression fracture by Dr. Nimisha Garza.  Patient transferred to inpatient rehab on 12/21/23.     Prior Level of Function:  Lives With: Spouse  Type of Home: House  Home Layout: One level  Home Access: Stairs to enter without rails  Entrance Stairs - Number of Steps: 2 MARIANA  Home Equipment: Walker, 4 wheeled, Wheelchair-manual, Walker, rolling, Grab bars, Reacher (transport w/c)   Bathroom Shower/Tub: Tub/Shower unit, Shower chair with back, Curtain  Bathroom Toilet: Handicap height  Bathroom Equipment: Shower chair, Hand-held shower, Grab bars in shower  Bathroom Accessibility: Accessible    ADL Assistance: Independent  Homemaking Assistance: Needs assistance (light meal prep, laundry.  Spouse performs other IADLs)  Homemaking Responsibilities: Yes  Ambulation Assistance: Independent  Transfer Assistance: Independent  Active : No  IADL Comments: Pt's spouse does most of the cooking and cleaning d/t poor standing endurance  Additional Comments: Pt. reports mod I with ADLs/mobility and transfers with use of rollator.  Pt.'s spouse assists with IADLs.  Spouse is very supportive and able to help as needed.    Restrictions/Precautions:  Restrictions/Precautions: Fall Risk, General Precautions  Position Activity Restriction  Other position/activity restrictions: Pt. s/p kyphoplasty, dialysis MWF with port in R chest region     SUBJECTIVE: Pt. In restroom upon arrival and pleasantly agrees to therapy session.  Pt. Motivated for session. Pt. Requests to use restroom a second time during session.     Pain: Not rated: R hip    Vitals:   Patient Vitals for the past 8 hrs:   BP Temp Temp src Pulse Resp SpO2 O2 Device   12/26/23 0800 (!) 167/84 97.6 °F (36.4 °C) Oral 80 16 98 % None (Room air)     *Vitals may reflect data entered into the flowsheet prior to or after PT session was

## 2023-12-26 NOTE — CARE COORDINATION
Patient educated on how to use incentive spirometer. Patient verbalized understanding and demonstrated proper use. Emphasized importance and usage of device, with coughing and deep breathing every 2 hours while awake.  Patient has slept well.

## 2023-12-26 NOTE — PLAN OF CARE
Problem: Discharge Planning  Goal: Discharge to home or other facility with appropriate resources  12/26/2023 1042 by Lo Carrington RN  Outcome: Progressing  Flowsheets (Taken 12/26/2023 0807)  Discharge to home or other facility with appropriate resources:   Identify barriers to discharge with patient and caregiver   Arrange for needed discharge resources and transportation as appropriate   Identify discharge learning needs (meds, wound care, etc)   Refer to discharge planning if patient needs post-hospital services based on physician order or complex needs related to functional status, cognitive ability or social support system  12/26/2023 0015 by Brandy Quevedo RN  Outcome: Progressing  Flowsheets (Taken 12/25/2023 2019)  Discharge to home or other facility with appropriate resources: Identify barriers to discharge with patient and caregiver     Problem: Safety - Adult  Goal: Free from fall injury  12/26/2023 1042 by Lo Carrington RN  Outcome: Progressing  12/26/2023 0015 by Brandy Quevedo RN  Outcome: Progressing     Problem: ABCDS Injury Assessment  Goal: Absence of physical injury  12/26/2023 1042 by Lo Carrington RN  Outcome: Progressing  12/26/2023 0015 by Brandy Quevedo RN  Outcome: Progressing     Problem: Pain  Goal: Verbalizes/displays adequate comfort level or baseline comfort level  12/26/2023 1042 by Lo Carrington RN  Outcome: Progressing  12/26/2023 0015 by Brandy Quevedo RN  Outcome: Progressing     Problem: Chronic Conditions and Co-morbidities  Goal: Patient's chronic conditions and co-morbidity symptoms are monitored and maintained or improved  12/26/2023 1042 by Lo Carrington RN  Outcome: Progressing  Flowsheets (Taken 12/26/2023 0807)  Care Plan - Patient's Chronic Conditions and Co-Morbidity Symptoms are Monitored and Maintained or Improved:   Monitor and assess patient's chronic conditions and comorbid symptoms for stability, deterioration, or improvement    symptoms of electrolyte imbalances   Administer electrolyte replacement as ordered  12/26/2023 0015 by Brandy Quevedo, RN  Outcome: Progressing  Flowsheets (Taken 12/25/2023 2019)  Electrolytes maintained within normal limits: Monitor labs and assess patient for signs and symptoms of electrolyte imbalances  Goal: Hemodynamic stability and optimal renal function maintained  12/26/2023 1042 by Lo Carrington RN  Outcome: Progressing  Flowsheets (Taken 12/26/2023 0807)  Hemodynamic stability and optimal renal function maintained:   Monitor labs and assess for signs and symptoms of volume excess or deficit   Monitor intake, output and patient weight  12/26/2023 0015 by Brandy Quevedo, RN  Outcome: Progressing  Flowsheets (Taken 12/25/2023 2019)  Hemodynamic stability and optimal renal function maintained: Monitor labs and assess for signs and symptoms of volume excess or deficit     Problem: Hematologic - Adult  Goal: Maintains hematologic stability  12/26/2023 1042 by Lo Carrington, RN  Outcome: Progressing  Flowsheets (Taken 12/26/2023 0807)  Maintains hematologic stability:   Assess for signs and symptoms of bleeding or hemorrhage   Monitor labs for bleeding or clotting disorders   Administer blood products/factors as ordered  12/26/2023 0015 by Brandy Quevedo, RN  Outcome: Progressing  Flowsheets (Taken 12/25/2023 2019)  Maintains hematologic stability: Assess for signs and symptoms of bleeding or hemorrhage

## 2023-12-26 NOTE — PROGRESS NOTES
[facility]   Nephrology progress note  12/26/2023, 12:09 PM      Pt Name:    Deloris Watts  MRN:     [id number]     YOB: 1955  Admit Date:    12/21/2023  1:13 PM    Chief Complaint: Nephrology following for ESRD and hemodialysis    Subjective:  Patient was seen and examined this morning  No chest pain or shortness of breath  Feels okay    Objective:  24HR INTAKE/OUTPUT:    Intake/Output Summary (Last 24 hours) at 12/26/2023 1209  Last data filed at 12/25/2023 2019  Gross per 24 hour   Intake 480 ml   Output --   Net 480 ml           I/O last 3 completed shifts:  In: 885 [P.O.:880; I.V.:5]  Out: -   No intake/output data recorded.   Admission weight: 47 kg (103 lb 9.9 oz)  Wt Readings from Last 3 Encounters:   12/25/23 48.5 kg (106 lb 14.8 oz)   12/20/23 47 kg (103 lb 9.9 oz)   12/15/23 45.4 kg (100 lb)        Vitals :   Vitals:    12/25/23 0946 12/25/23 1055 12/25/23 2019 12/26/23 0800   BP:  (!) 154/79 (!) 147/80 (!) 167/84   Pulse:  68 87 80   Resp: 18 19 20 16   Temp:  97.8 °F (36.6 °C) 97.9 °F (36.6 °C) 97.6 °F (36.4 °C)   TempSrc:  Oral Oral Oral   SpO2:   98% 98%   Weight:       Height:           Physical examination  General Appearance: alert and cooperative with exam, appears comfortable, no distress  Mouth/Throat: Oral mucosa moist  Neck: No JVD  Lungs:  no use of accessory muscles  GI: soft, non-tender, no guarding  Extremities: no significant LE edema    Medications:  Infusion:    sodium chloride       Meds:    diclofenac sodium  2 g Topical 4x Daily    traZODone  50 mg Oral Nightly    sodium chloride flush  5-40 mL IntraVENous 2 times per day    sevelamer  800 mg Oral TID WC    senna  2 tablet Oral Nightly    polyethylene glycol  17 g Oral BID    montelukast  10 mg Oral Daily    lidocaine  1 patch TransDERmal Daily    folic acid  500 mcg Oral Daily    fluticasone  2 spray Each Nostril Daily    famotidine  10 mg Oral Daily    docusate sodium  1 enema Rectal Daily     docusate sodium  100 mg Oral BID    cetirizine  5 mg Oral Daily    calcitRIOL  0.25 mcg Oral Daily    atorvastatin  10 mg Oral Nightly    amLODIPine  5 mg Oral Daily    linaclotide  290 mcg Oral QAM AC    melatonin  6 mg Oral Nightly    clopidogrel  75 mg Oral Daily     Meds prn: traMADol, sodium chloride flush, phenol, ondansetron, cyclobenzaprine, sodium chloride, acetaminophen, traZODone, bisacodyl, diclofenac sodium     Lab Data :  CBC:   Recent Labs     12/26/23  0539   WBC 6.9   HGB 10.5*   HCT 33.8*          CMP:  Recent Labs     12/24/23  0557 12/25/23  0523 12/26/23  0539    141 140   K 4.6 4.4 4.3    104 107   CO2 25 24 22*   BUN 56* 39* 53*   CREATININE 5.5* 4.4* 6.0*   GLUCOSE 85 86 88   CALCIUM 9.7 9.7 9.8       Hepatic:   No results for input(s): \"LABALBU\", \"AST\", \"ALT\", \"ALB\", \"BILITOT\", \"ALKPHOS\" in the last 72 hours.        Assessment and Plan:  ESRD on hemodialysis Mondays, Wednesdays and Fridays  Electrolytes noted-- stable  Volume status stable  Plan hemodialysis tomorrow  Mild hyperkalemia.  Resolved  Status post TDC exchange  Metabolic acidosis  Chronic diastolic dysfunction  Status post fall  Secondary hyperparathyroidism  Hypertension      Vasiliy Barnard MD  [facility]    This report has been created using voice recognition software. It may contain minor errors which are inherent in voice recognition technology

## 2023-12-26 NOTE — PROGRESS NOTES
Physical Medicine & Rehabilitation Progress Note    Chief Complaint:  Low back pain, generalized weakness after a fall    Subjective:    Deloris Watts is a 68 y.o. right-handed  female with history of hypertension, hyperlipidemia, hydronephrosis, fibromyalgia, irritable bowel syndrome, osteoporosis, end-stage renal disease on hemodialysis since January 2023, stroke with left side weakness in 1990s, anemia, depression, anxiety, right wrist and right ankle fracture treated conservatively, right proximal humeral fracture due to fall requiring ORIF, status post right carpal tunnel release surgery, status post cervical spine surgery, status post hysterectomy and bilateral oophorectomy, hemorrhoidectomy, sinus surgery, tubal ligation, LASEK surgery, was admitted on 12/21/2023 for intensive inpatient management of impairment & disability secondary to fall accident resulting L2 compression fracture requiring kyphoplasty, and liver laceration.       The patient says she was trying to put on her glasses standing in front on her dresser in the bedroom when suddenly she lost balance and fell backward to the floor at night of 12/14/2023.  She immediately feels severe pain on her lower back.  She did not lose consciousness.  She managed to crawl back to her bed at the time.  Her  called 911 and EMS brought the patient to [facility] ER in early morning of 12/15/2023.  She complains of low back pain and some pain at her head in ER.  Her blood pressure was 175/94 in ER.  Her BUN/creatinine was 51/6.1.  At home she take aspirin and Plavix.  CT of the head, cervical spine, abdomen and pelvis, and lumbar spine were done on 12/15/2023.  She was found to have new L2 inferior endplate fracture, and hepatic laceration/contusion.  Nephrology was consulted to continue her hemodialysis.  MRI of thoracic and lumbar spine were ordered and performed on 12/15/2023.  Lumbar spine MRI done on 12/15/2023 confirmed  lidocaine  Continue scheduled Voltaren gel application to right hip and lower back painful area for pain control  Continue Norvasc for hypertension  Continue Lipitor for hyperlipidemia  Continue calcitriol, sevelamer for hypocalcemia and hyperphosphatemia due to renal failure requiring hemodialysis  Continue Lokelma for hyperkalemia  Continue Pepcid for gastric protection  Continue Linzess for irritable bowel syndrome  Continue Singulair, Flonase for allergic rhinitis  Continue folic acid supplement  Continue Plavix for secondary stroke prevention  Continue melatonin, trazodone as needed for insomnia  Continues hemodialysis (Monday, Wednesday and Friday) under the direction of nephrology service  Nutrition: Continue regular diet  Bladder: Monitoring signs or symptoms of UTI  Bowel: Monitoring signs or symptoms of constipation   and case management for coordination of care and discharge planning      Missed Therapy Time:  None      ANTON DAVIS MD

## 2023-12-26 NOTE — PROGRESS NOTES
[facility]  Occupational Therapy  Daily Note  Time:   Time In: 0900  Time Out: 1000  Timed Code Treatment Minutes: 60 Minutes  Minutes: 60          Date: 2023  Patient Name: Deloris Watts,   Gender: female      Room: [facility]02/002-A  MRN: [id number]  : 1955  (68 y.o.)  Referring Practitioner: ordering:  Holly Whitaker APRN - CNP, attending: Jax Whatley MD  Diagnosis: traumatic compression fracture of L2 lumbar vertebra, closed initial encounter  Additional Pertinent Hx: Deloris is a 68 year old female whom presented to [facility] ED on 12/15/23 s/p fall on 23.  It is noted that pt has demonstrated reduced balance.  CT imaging of brain completed with no abnormalities, CT of cervical spine indicated fusion at C5-7 with no new abnormalities, CT of lumbar spine shows new inferior endplate fx at L2 and multiple levels of foraminal stenosis.  CT of abdomen/pelvis noted suggestive hepatic laceration/contusion.  It is noted pt has mild TTP of abdomen.  Pt. Underwent an L2 kyphoplasty on 23.  Pt. transferred to the IPR unit on 23 for further medical care [facility]    Restrictions/Precautions:  Restrictions/Precautions: Fall Risk, General Precautions  Position Activity Restriction  Other position/activity restrictions: Pt. s/p kyphoplasty, dialysis MWF with port in R chest region     SUBJECTIVE: Pt was resting in recliner upon arrival, pleasant and agreeable to OT.     PAIN: 4/10    Vitals: Vitals not assessed per clinical judgement, see nursing flowsheet    COGNITION: Slow Processing and Decreased Safety Awareness    ADL:   No ADL's completed this session.  Pt declined .    BALANCE:  Sitting Balance:  Supervision.    Standing Balance: Stand By Assistance.      BED MOBILITY:  Sit to Supine: Stand By Assistance    Scooting: Stand By Assistance      TRANSFERS:  Sit to Stand:  Stand By Assistance.  training, Neuromuscular re-education, Cognitive reorientation, Equipment evaluation, education, & procurement, Patient/Caregiver education & training, Home management training, Coordination training, Gait training, Wheelchair mobility training    Education:  Learners: Patient  Plan of Care, ADL's, IADL's, Reviewed Prior Education, and Importance of Increasing Activity    Goals  Short Term Goals  Time Frame for Short Term Goals: 1 week  Short Term Goal 1: Pt. to retrieve/transport at least 3 items SBA in prep for self care regimen.  Short Term Goal 2: Pt. to demo SBA during standing tasks up to 5 min 1-2 hand release to improve activity tolerance with ADLs.  Short Term Goal 3: Pt. to recall at least 3 fall prevention techniques with good carryover during daily occupations to prevent further falls from occurring.  Long Term Goals  Time Frame for Long Term Goals : 2 weeks from OT tom on IPR unit  Long Term Goal 1: Pt. to demo Mod I with self care regimen (dressing, sponge bathing, grooming, toileting) with good safety to progress to PLOF.  Long Term Goal 2: Pt to demo Mod I with toilet transfers with 0 cues for proper technique to maximize independence with ADLs.  Long Term Goal 3: Pt. to demo Supervision with light IADLs (simple snack prep, bed making, laundry) to advance engagement with daily occupations.    Following session, patient left in safe position with all fall risk precautions in place.

## 2023-12-27 PROCEDURE — 97535 SELF CARE MNGMENT TRAINING: CPT

## 2023-12-27 PROCEDURE — 6370000000 HC RX 637 (ALT 250 FOR IP): Performed by: PHYSICAL MEDICINE & REHABILITATION

## 2023-12-27 PROCEDURE — 90935 HEMODIALYSIS ONE EVALUATION: CPT

## 2023-12-27 PROCEDURE — 99232 SBSQ HOSP IP/OBS MODERATE 35: CPT | Performed by: INTERNAL MEDICINE

## 2023-12-27 PROCEDURE — 6370000000 HC RX 637 (ALT 250 FOR IP): Performed by: NURSE PRACTITIONER

## 2023-12-27 PROCEDURE — 99232 SBSQ HOSP IP/OBS MODERATE 35: CPT | Performed by: PHYSICAL MEDICINE & REHABILITATION

## 2023-12-27 PROCEDURE — 97530 THERAPEUTIC ACTIVITIES: CPT

## 2023-12-27 PROCEDURE — 97130 THER IVNTJ EA ADDL 15 MIN: CPT

## 2023-12-27 PROCEDURE — 1180000000 HC REHAB R&B

## 2023-12-27 PROCEDURE — 97129 THER IVNTJ 1ST 15 MIN: CPT

## 2023-12-27 PROCEDURE — 97112 NEUROMUSCULAR REEDUCATION: CPT

## 2023-12-27 PROCEDURE — 97110 THERAPEUTIC EXERCISES: CPT

## 2023-12-27 PROCEDURE — 97116 GAIT TRAINING THERAPY: CPT

## 2023-12-27 RX ADMIN — POLYETHYLENE GLYCOL 3350 17 G: 17 POWDER, FOR SOLUTION ORAL at 21:54

## 2023-12-27 RX ADMIN — SEVELAMER CARBONATE 800 MG: 800 TABLET, FILM COATED ORAL at 17:54

## 2023-12-27 RX ADMIN — SENNOSIDES 17.2 MG: 8.6 TABLET, FILM COATED ORAL at 21:54

## 2023-12-27 RX ADMIN — SEVELAMER CARBONATE 800 MG: 800 TABLET, FILM COATED ORAL at 08:16

## 2023-12-27 RX ADMIN — TRAZODONE HYDROCHLORIDE 50 MG: 50 TABLET ORAL at 21:54

## 2023-12-27 RX ADMIN — ATORVASTATIN CALCIUM 10 MG: 10 TABLET, FILM COATED ORAL at 22:58

## 2023-12-27 RX ADMIN — ACETAMINOPHEN 650 MG: 325 TABLET ORAL at 08:15

## 2023-12-27 RX ADMIN — FOLIC ACID 500 MCG: 1 TABLET ORAL at 08:15

## 2023-12-27 RX ADMIN — CALCITRIOL CAPSULES 0.25 MCG 0.25 MCG: 0.25 CAPSULE ORAL at 08:16

## 2023-12-27 RX ADMIN — CETIRIZINE HYDROCHLORIDE 5 MG: 5 TABLET ORAL at 08:16

## 2023-12-27 RX ADMIN — DOCUSATE SODIUM 100 MG: 100 CAPSULE, LIQUID FILLED ORAL at 21:54

## 2023-12-27 RX ADMIN — Medication 6 MG: at 21:54

## 2023-12-27 RX ADMIN — MONTELUKAST SODIUM 10 MG: 10 TABLET ORAL at 08:16

## 2023-12-27 RX ADMIN — CLOPIDOGREL BISULFATE 75 MG: 75 TABLET ORAL at 08:15

## 2023-12-27 RX ADMIN — FAMOTIDINE 10 MG: 20 TABLET ORAL at 08:15

## 2023-12-27 RX ADMIN — DICLOFENAC SODIUM 2 G: 10 GEL TOPICAL at 21:55

## 2023-12-27 RX ADMIN — AMLODIPINE BESYLATE 5 MG: 5 TABLET ORAL at 08:16

## 2023-12-27 ASSESSMENT — PAIN SCALES - GENERAL
PAINLEVEL_OUTOF10: 0

## 2023-12-27 NOTE — PROGRESS NOTES
[facility]  INPATIENT SPEECH THERAPY  [facility]  DAILY NOTE    TIME   SLP Individual Minutes  Time In: 0830  Time Out: 0900  Minutes: 30  Timed Code Treatment Minutes: 30 Minutes       Date: 2023  Patient Name: Deloris Watts      CSN: [id number]   : 1955  (68 y.o.)  Gender: female   Referring Physician:  Holly Whitaker, APRN - CNP  Diagnosis: Traumatic compression fracture of L2 lumbar vertebra, closed, initial encounter  Precautions: fall risk  Current Diet: Regular Diet with Thin Liquids  Respiratory Status: Room Air  Swallowing Strategies: Standard Universal Swallow Precautions  Date of Last MBS/FEES: Not Applicable    Pain:  No pain reported.    Subjective: Patient seen sitting upright in recliner upon ST arrival; alert, pleasant, and cooperative throughout with report of being tired this date. No family present.     Short-Term Goals:  SHORT TERM GOAL #1:  Goal 1: Patient will complete immediate/delayed recall tasks with 70% accuracy given min cues to improve retention of novel and newly presented information.   INTERVENTIONS:  24 Hour Delayed Recall of Personal Appointment (Tuesday, 2024 with Dr. Rich, 10:30am, [facility] (pre-op)): 3/6 independent, 2/6 with logical cuing, 1/6 with [facility] cuing  *No written visual aid utilized; correction for month of appointment and name of physician    SHORT TERM GOAL #2:  Goal 2: Patient will complete problem solving, visual/verbal reasoning, executive functioning tasks with 70% given min cues to improve return to IADLs/ADLs.  INTERVENTIONS:     Safety Problem Solving (Verbal)   ID Problem: 14/20 independent, 5/20 with minimal cuing, 1/20 with maximum cuing   Solution: 15/20 independent, 2/20 with moderate cuing, 3/20 with maximum cuing  *Patient with reduced mental flexibility given reduced ability to provide alternative solutions to identified problems with frequent report, \"I would just ask for help.\"    SHORT    Plan for Next Session: problem solving, basic medication comprehension, recall tasks   Discharge Recommendations: Outpatient Speech Therapy, Home Health, and Continue to Assess Pending Progress     Moisés Kirk M.S., CCC-SLP [id number]

## 2023-12-27 NOTE — PLAN OF CARE
Problem: Discharge Planning  Goal: Discharge to home or other facility with appropriate resources  12/27/2023 1439 by Vanessa Montano, RN  Outcome: Progressing  Patient plans to discharge home 1/3 with outpatient PT.     Problem: Safety - Adult  Goal: Free from fall injury  12/27/2023 1439 by Vanessa Montano, RN  Outcome: Progressing  Patient will continue to use call light for assistance to prevent falls and promote patient safety.       Problem: Pain  Goal: Verbalizes/displays adequate comfort level or baseline comfort level  Outcome: Progressing  Patient denies pain at this time, will continue to assess.

## 2023-12-27 NOTE — CARE COORDINATION
Focus Note    Patient educated on how to use incentive spirometer. Patient verbalized understanding and demonstrated proper use. Emphasized importance and usage of device, with coughing and deep breathing every 4 hours while awake.     Patient up once to the bathroom during the night. Uses call light appropriately. Rested well.

## 2023-12-27 NOTE — PLAN OF CARE
Problem: Discharge Planning  Goal: Discharge to home or other facility with appropriate resources  12/27/2023 0115 by Karime Browning LPN  Outcome: Progressing  Flowsheets (Taken 12/26/2023 2111)  Discharge to home or other facility with appropriate resources:   Identify barriers to discharge with patient and caregiver   Arrange for needed discharge resources and transportation as appropriate   Identify discharge learning needs (meds, wound care, etc)   Refer to discharge planning if patient needs post-hospital services based on physician order or complex needs related to functional status, cognitive ability or social support system  Note: Patient continues to progress with therapy. Social service working on discharge needs. Patient plans to discharge 01/03/2024.     Problem: Safety - Adult  Goal: Free from fall injury  Outcome: Progressing  Flowsheets (Taken 12/24/2023 1126 by Vrooman, Rylee, LPN)  Free From Fall Injury: Instruct family/caregiver on patient safety  Note: No falls sustained at this time. Patient alert to call light and uses appropriately to alert staff of needs. Bed/chair alarm in use.     Problem: Skin/Tissue Integrity  Goal: Absence of new skin breakdown  Description: 1.  Monitor for areas of redness and/or skin breakdown  2.  Assess vascular access sites hourly  3.  Every 4-6 hours minimum:  Change oxygen saturation probe site  4.  Every 4-6 hours:  If on nasal continuous positive airway pressure, respiratory therapy assess nares and determine need for appliance change or resting period.  Outcome: Progressing  Note: No new skin issues noted this shift.       Care plan reviewed with patient.  Patient verbalizes understanding of the plan of care and contribute to goal setting.

## 2023-12-27 NOTE — PROGRESS NOTES
Patient educated on how to use incentive spirometer. Patient verbalized understanding and demonstrated proper use. Emphasized importance and usage of device, with coughing and deep breathing every shift hours while awake.          50-year-old male no past medical history coming in with 20 days of scapular back pain.  Patient denies any traumatic injury but does do heavy lifting for work.  Reports waking up after sleeping awkwardly with his daughter in his bed when the pain started.  Patient reports no numbness or weakness in the upper extremities.  No history of prior back problems or prior surgeries.  Was seen at [facility] yesterday given NSAIDs and a muscle relaxer which have not helped his pain.  Patient endorsing that pain is keeping him up at night.  Patient was given no follow-up.  Pain worse with movement, nothing makes it better.

## 2023-12-27 NOTE — PROGRESS NOTES
Mobility:  Rolling to Left: Stand By Assistance, with verbal cues    Supine to Sit: Stand By Assistance, with verbal cues   Sit to Supine: Stand By Assistance, with verbal cues    *VCs for log roll technique.     Transfers:  Sit to Stand:Supervision  Stand to Sit:Supervision  Many cues required to lock rollator brakes and reach back for surface.     Ambulation:  Stand By Assistance  Distance: 300' x 2 with RW, 100' x 1, 50' x 2, multiple shorter distances with Rollator  Surface: Level Tile  Device: Rolling Walker and 4 Wheeled Walker  Gait Deviations:  Slow Anna, Decreased Step Length Bilaterally, Decreased Gait Speed, Decreased Heel Strike Bilaterally, Wide Base of Support, and Decreased Terminal Knee Extension    Stairs:  Platform: 6\" platform X 1 using 4 Wheeled Walker and Minimal Assistance, with verbal cues , with increased time for completion.    Balance:  Pt. Completed standing dynamic balance activities with Narrow DENNIS, Normal DENNIS on Airex balance pad and Intermittent UE support on 4 Wheeled Walker with Contact Guard Assistance. Activity completed to improve balance, enhance functional mobility, and reduce risk of falls.     Neuromuscular Re-education  Pt. Completed dynamic gait activities using Bilateral UE support on 4 Wheeled Walker to improve gait mechanics and maneuverability with 4WW  for improved functional mobility.     Exercise:  Patient was guided in 1 set(s) 10 reps of exercises: Glut sets, Seated marches, Seated hamstring curls, Seated heel/toe raises, Long arc quads, Seated isometric hip adduction, Seated abduction/adduction, and abdominal isometrics, Heelslides, Hip abduction/adduction, and Pelvic tilts.  Exercises were completed for increased independence with functional mobility.    Functional Outcome Measures:   Not completed  Modified Homer Scale:  Not Applicable     ASSESSMENT:  Assessment: Patient progressing toward established goals.  Activity Tolerance:  Patient tolerance of   treatment: good.     Equipment Recommendations:Equipment Needed: No (has RW, rollator, wheelchair and transport chair)  Discharge Recommendations: Continue to assess pending progress, Patient would benefit from continued therapy after discharge     PLAN: Current Treatment Recommendations: Strengthening, Balance training, Functional mobility training, Transfer training, Endurance training, Wheelchair mobility training, Gait training, Stair training, Neuromuscular re-education, Home exercise program, Safety education & training, Patient/Caregiver education & training, Therapeutic activities  General Plan:  (5x/wk 90min)    Patient Education  Patient Education: Plan of Care, Functional Mobility, Reviewed Prior Education, Health Promotion and Wellness Education, Safety, Verbal Exercise Instruction    Goals:  Patient Goals : \"Be able to walk like I was with my walker\"  Short Term Goals  Time Frame for Short Term Goals: 1 week  Short Term Goal 1: Patient will complete supine < > sit with head of bed flat and no rails with supervision to transfer in and out of bed with decreased difficulty.  Short Term Goal 2: Patient will complete sit < > stand with SBA and less than or equal to 25% verbal cues for hand placement to stand to ambulate with increased safety.  Short Term Goal 3: Patient will ambulate 50' with a RW and SBA to progress towards navigating home safely. GOAL MET, SEE LTG  Short Term Goal 4: Patient will ascend/descend 2 steps with 1 handrail and CGA to access leave home.  Short Term Goal 5: Patient will improve tinetti to greater than or equal to 19/28 to reduce her risk for falls.  Long Term Goals  Time Frame for Long Term Goals : 3 weeks  Long Term Goal 1: Patient will complete supine < > sit with modified independence to transfer in and out of bed safely.  Long Term Goal 2: Patient will complete sit < > stand with modified independence to stand to ambulate safely.  Long Term Goal 3: Patient will complete bed <

## 2023-12-27 NOTE — PROGRESS NOTES
[facility]  Occupational Therapy  Daily Note  Time:   Time In: 0700  Time Out: 0800  Timed Code Treatment Minutes: 60 Minutes  Minutes: 60          Date: 2023  Patient Name: Deloris Watts,   Gender: female      Room: [facility]02/002-A  MRN: [id number]  : 1955  (68 y.o.)  Referring Practitioner: ordering:  Holly Whitaker APRN - CNP, attending: Jax Whatley MD  Diagnosis: traumatic compression fracture of L2 lumbar vertebra, closed initial encounter  Additional Pertinent Hx: Deloris is a 68 year old female whom presented to [facility] ED on 12/15/23 s/p fall on 23.  It is noted that pt has demonstrated reduced balance.  CT imaging of brain completed with no abnormalities, CT of cervical spine indicated fusion at C5-7 with no new abnormalities, CT of lumbar spine shows new inferior endplate fx at L2 and multiple levels of foraminal stenosis.  CT of abdomen/pelvis noted suggestive hepatic laceration/contusion.  It is noted pt has mild TTP of abdomen.  Pt. Underwent an L2 kyphoplasty on 23.  Pt. transferred to the IPR unit on 23 for further medical care and referred to [facility] OT services.    Restrictions/Precautions:  Restrictions/Precautions: Fall Risk, General Precautions  Position Activity Restriction  Other position/activity restrictions: Pt. s/p kyphoplasty, dialysis MWF with port in R chest region     SUBJECTIVE: Patient supine upon arrival, agreeable to OT. Finishing early breakfast upon arrival.      PAIN: Denies      Vitals: Vitals not assessed per clinical judgement, see nursing flowsheet    COGNITION: Decreased Recall, Impaired Memory, Impaired Attention,     ADL:   Grooming: Stand By Assistance.  X 3 min for oral care & hair care   Bathing: Stand By Assistance.  During clothing mgmt and hygiene   Upper Extremity Dressing: with set-up.  Donning t-shirt   Lower Extremity Dressing: Stand By Assistance.  During  training, Coordination training, Gait training, Wheelchair mobility training    Education:  Learners: Patient  ADL's, IADL's, Reviewed Prior Education, Home Safety, and Assistive Device Safety    Goals  Short Term Goals  Time Frame for Short Term Goals: 1 week  Short Term Goal 1: Pt. to retrieve/transport at least 3 items SBA in prep for self care regimen.  Short Term Goal 2: Pt. to demo SBA during standing tasks up to 5 min 1-2 hand release to improve activity tolerance with ADLs.  Short Term Goal 3: Pt. to recall at least 3 fall prevention techniques with good carryover during daily occupations to prevent further falls from occurring.  Long Term Goals  Time Frame for Long Term Goals : 2 weeks from OT tom on IPR unit  Long Term Goal 1: Pt. to demo Mod I with self care regimen (dressing, sponge bathing, grooming, toileting) with good safety to progress to PLOF.  Long Term Goal 2: Pt to demo Mod I with toilet transfers with 0 cues for proper technique to maximize independence with ADLs.  Long Term Goal 3: Pt. to demo Supervision with light IADLs (simple snack prep, bed making, laundry) to advance engagement with daily occupations.    Following session, patient left in safe position with all fall risk precautions in place.

## 2023-12-27 NOTE — PROGRESS NOTES
#2:  Goal 2: Patient will complete problem solving, visual/verbal reasoning, executive functioning tasks with 70% given min cues to improve return to IADLs/ADLs.  INTERVENTIONS:  DNT due to focus on additional STGs.    AM SESSION:  Safety Problem Solving (Verbal)   ID Problem: 14/20 independent, 5/20 with minimal cuing, 1/20 with maximum cuing   Solution: 15/20 independent, 2/20 with moderate cuing, 3/20 with maximum cuing  *Patient with reduced mental flexibility given reduced ability to provide alternative solutions to identified problems with frequent report, \"I would just ask for help.\"    SHORT TERM GOAL #3:  Goal 3: Patient will complete sequencing and thought organization tasks with 70% accuracy given min cues to improve mental flexibility.  INTERVENTIONS:   DNT due to focus on additional STGs.    AM SESSION:  Time Word Problems with Picture ([place]): 3/5 independent, 1/5 with logical cuing for reading comprehension, 1/5 with maximum cuing  *Reduced reading comprehension and thoguht organization endorsed  *Fair math computation r/t time word problems, however, reduced math reasoning requiring additional cuing to achieve success  *At least moderate cuing needed for task analysis     SHORT TERM GOAL #4:  Goal 4: Patient will complete sustained, selective, alternating, and divided attention tasks with no more than 5 errors/redirections in 5 minutes/task completion to improve mental focus.  INTERVENTIONS:  DNT due to focus on additional STGs.    Long-Term Goals:  Time Frame for Long Term Goals: 3 weeks    LONG TERM GOAL #1:  Goal 1: Patient will improve cognitive function to a level of supervision in order to make a safe return to PLOF.       Comprehension: 5 - Patient understands basic needs (hungry/hot/pain)  Expression: 6 - Device used to express complex ideas/needs  Social Interaction: 6 - Patient requires medication for mood and/or effect  Problem Solving: 3 - Patient solves simple/routine tasks  50%-74%  Memory: 3 - Patient remembers 50%-74% of the time    Functional Oral Intake Scale: Total Oral Intake: 7.  Total oral intake with no restrictions    EDUCATION:  Learner: Patient and Significant Other  Education:  Reviewed ST goals and Plan of Care and Reviewed recommendations for follow-up  Evaluation of Education: Verbalizes understanding and Needs further instruction    ASSESSMENT/PLAN:  Activity Tolerance:  Patient tolerance of  treatment: good.      Assessment/Plan: Patient progressing toward established goals.  Continues to require skilled care of licensed speech pathologist to progress toward achievement of established goals and plan of care..     Plan for Next Session: problem solving, basic medication comprehension, recall tasks   Discharge Recommendations: Outpatient Speech Therapy, Home Health, and Continue to Assess Pending Progress     Moisés Kirk M.S., CCC-SLP [id number]

## 2023-12-27 NOTE — CARE COORDINATION
Patient completed full round of dialysis today, 1L removed. Worked on discharge planning for patient. Patient to discharge 1/3.

## 2023-12-27 NOTE — PROGRESS NOTES
[facility]   Nephrology progress note  12/27/2023, 2:43 PM      Pt Name:    Deloris Watts  MRN:     [id number]     YOB: 1955  Admit Date:    12/21/2023  1:13 PM    Chief Complaint: Nephrology following for ESRD and hemodialysis    Subjective:  Patient was seen and examined this morning  No chest pain or shortness of breath    Objective:  24HR INTAKE/OUTPUT:    Intake/Output Summary (Last 24 hours) at 12/27/2023 1443  Last data filed at 12/27/2023 1437  Gross per 24 hour   Intake 1377 ml   Output 1400 ml   Net -23 ml        Admission weight: 47 kg (103 lb 9.9 oz)  Wt Readings from Last 3 Encounters:   12/27/23 48.6 kg (107 lb 2.3 oz)   12/20/23 47 kg (103 lb 9.9 oz)   12/15/23 45.4 kg (100 lb)        Vitals :   Vitals:    12/26/23 2141 12/27/23 0810 12/27/23 1150 12/27/23 1437   BP:  (!) 151/82 (!) 165/61 138/77   Pulse:  89 87 77   Resp: 16 18 18 18   Temp:  97.7 °F (36.5 °C) 97.1 °F (36.2 °C) 97 °F (36.1 °C)   TempSrc:  Oral     SpO2:  100%     Weight:   49.6 kg (109 lb 5.6 oz) 48.6 kg (107 lb 2.3 oz)   Height:           Physical examination  General Appearance: Awake and alert no no distress  Mouth/Throat: Oral mucosa moist  Neck: Accessory muscle  Lungs: Clear  Heart:  S1, S2 heard  GI: soft, non-tender, no guarding  Extremities no edema    Medications:  Infusion:    sodium chloride       Meds:    diclofenac sodium  2 g Topical 4x Daily    traZODone  50 mg Oral Nightly    sevelamer  800 mg Oral TID WC    senna  2 tablet Oral Nightly    polyethylene glycol  17 g Oral BID    montelukast  10 mg Oral Daily    folic acid  500 mcg Oral Daily    fluticasone  2 spray Each Nostril Daily    famotidine  10 mg Oral Daily    docusate sodium  1 enema Rectal Daily    docusate sodium  100 mg Oral BID    cetirizine  5 mg Oral Daily    calcitRIOL  0.25 mcg Oral Daily    atorvastatin  10 mg Oral Nightly    amLODIPine  5 mg Oral Daily    linaclotide  290 mcg Oral QAM AC    melatonin  6 mg Oral

## 2023-12-27 NOTE — PROGRESS NOTES
and family.    Prophylaxis:  DVT: ACE stocking, intermittent pneumatic compression device.  GI: Colace, Enemeez enema, GlycoLax, Senokot, lactulose, Dulcolax suppository as needed  Pain: Tylenol as needed, Flexeril as needed, tramadol as needed, Voltaren gel as needed; discontinue lidocaine  Continue scheduled Voltaren gel application to right hip and lower back painful area for pain control  Continue Norvasc for hypertension  Continue Lipitor for hyperlipidemia  Continue calcitriol, sevelamer for hypocalcemia and hyperphosphatemia due to renal failure requiring hemodialysis  Continue Lokelma for hyperkalemia  Continue Pepcid for gastric protection  Continue Linzess for irritable bowel syndrome  Continue Singulair, Flonase for allergic rhinitis  Continue folic acid supplement  Continue Plavix for secondary stroke prevention  Continue melatonin, trazodone as needed for insomnia  Continues hemodialysis (Monday, Wednesday and Friday) under the direction of nephrology service  Nutrition: Continue regular diet  Bladder: Monitoring signs or symptoms of UTI  Bowel: Monitoring signs or symptoms of constipation   and case management for coordination of care and discharge planning      Missed Therapy Time:  None      ANTON DAVIS MD

## 2023-12-27 NOTE — CONSULTS
Department of Family Practice  Consult Note        Reason for Consult:  Medical management while on the Inpatient Rehab unit.    Requesting Physician:  Dr Whatley    CHIEF COMPLAINT:   The need to continue the intensive time with therapies following the acute hospital stay.    History Obtained From:  patient, EMR    HISTORY OF PRESENT ILLNESS:              The patient is a 68 y.o. female with significant past medical history of       Diagnosis Date    Allergic rhinitis     Anemia in CKD (chronic kidney disease)     Anxiety     CHF (congestive heart failure) ([facility])     Closed fracture of right proximal humerus 09/18/2021    ORIF    Closed right ankle fracture     In 1990s; treated with casting    CVA (cerebral infarction)     in 1990s ; with left-sided weakness    Depression     Fibromyalgia     in 1990s    Headache(784.0)     Hemiplegia and hemiparesis following cerebral infarction affecting left non-dominant side (HCC)     in 1990s    Hydronephrosis 09/01/2023    Urogenital Implants, calculus of ureter, UTI    Hyperlipidemia     Hypertension     in 1980s    Irritable bowel syndrome     in 1990s    Kidney failure     Chronic Kidney Disease, Renal Dialysis started in 1/2023    Osteoporosis 2019    Unspecified cerebral artery occlusion with cerebral infarction     Unspecified sleep apnea     Wrist fracture, right 2018    Treated with casting      who presents with a backward fall that resulted in a compression fracture of L2. She had a kyphoplasty and continues to have some pain to the back. Following being cleared medically, she has come to the Inpatient Rehab Unit to continue the time with therapies prior to a discharge disposition being made.      Past Medical History:        Diagnosis Date    Allergic rhinitis     Anemia in CKD (chronic kidney disease)     Anxiety     CHF (congestive heart failure) ([facility])     Closed fracture of right proximal humerus 09/18/2021    ORIF    Closed right ankle fracture     In 1990s;  WC  senna (SENOKOT) tablet 17.2 mg, 2 tablet, Oral, Nightly  polyethylene glycol (GLYCOLAX) packet 17 g, 17 g, Oral, BID  phenol 1.4 % mouth spray 1 spray, 1 spray, Mouth/Throat, Q2H PRN  ondansetron (ZOFRAN-ODT) disintegrating tablet 4 mg, 4 mg, Oral, Q8H PRN  montelukast (SINGULAIR) tablet 10 mg, 10 mg, Oral, Daily  folic acid (FOLVITE) tablet 500 mcg, 500 mcg, Oral, Daily  fluticasone (FLONASE) 50 MCG/ACT nasal spray 2 spray, 2 spray, Each Nostril, Daily  famotidine (PEPCID) tablet 10 mg, 10 mg, Oral, Daily  docusate sodium (ENEMEEZ) enema 283 mg, 1 enema, Rectal, Daily  docusate sodium (COLACE) capsule 100 mg, 100 mg, Oral, BID  cyclobenzaprine (FLEXERIL) tablet 10 mg, 10 mg, Oral, TID PRN  cetirizine (ZYRTEC) tablet 5 mg, 5 mg, Oral, Daily  calcitRIOL (ROCALTROL) capsule 0.25 mcg, 0.25 mcg, Oral, Daily  atorvastatin (LIPITOR) tablet 10 mg, 10 mg, Oral, Nightly  0.9 % sodium chloride infusion, , IntraVENous, PRN  acetaminophen (TYLENOL) tablet 650 mg, 650 mg, Oral, Q4H PRN  amLODIPine (NORVASC) tablet 5 mg, 5 mg, Oral, Daily  linaclotide (LINZESS) capsule 290 mcg (Patient Supplied), 290 mcg, Oral, QAM AC  melatonin tablet 6 mg, 6 mg, Oral, Nightly  traZODone (DESYREL) tablet 50 mg, 50 mg, Oral, Nightly PRN  clopidogrel (PLAVIX) tablet 75 mg, 75 mg, Oral, Daily  bisacodyl (DULCOLAX) suppository 10 mg, 10 mg, Rectal, Daily PRN  diclofenac sodium (VOLTAREN) 1 % gel 2 g, 2 g, Topical, 4x Daily PRN  Allergies:  Pioglitazone, Amoxicillin, Amoxicillin-pot clavulanate, Other, Ciprofloxacin, and Sulfa antibiotics    Social History:   MARITAL STATUS:      Family History:       Problem Relation Age of Onset    Diabetes Mother     High Blood Pressure Mother     Heart Disease Mother     Heart Disease Father     Parkinsonism Father     Neurofibromatosis Father     Depression Father     Alcohol Abuse Father     Neurofibromatosis Sister     Diabetes Sister     High Blood Pressure Sister     Depression Sister

## 2023-12-27 NOTE — FLOWSHEET NOTE
12/27/23 1437   Vital Signs   /77   Temp 97 °F (36.1 °C)   Pulse 77   Respirations 18   Weight - Scale 48.6 kg (107 lb 2.3 oz)   Weight Method Bed scale   Percent Weight Change -2.02   Post-Hemodialysis Assessment   Post-Treatment Procedures Blood returned;Catheter Capped, clamped with Saline x2 ports   Machine Disinfection Process Acid/Vinegar Clean;Heat Disinfect;Exterior Machine Disinfection   Rinseback Volume (ml) 400 ml   Blood Volume Processed (Liters) 50 L   Dialyzer Clearance Lightly streaked   Duration of Treatment (minutes) 150 minutes   Heparin Amount Administered During Treatment (mL) 0 mL   Hemodialysis Intake (ml) 400 ml   Hemodialysis Output (ml) 1400 ml   NET Removed (ml) 1000         stable 2.5 hour treatment. 1 liter net uf. cath lines flushed with 0.9 ns, clamped and tegos in place. dressing changed per protocol. report given to primary nurse.

## 2023-12-28 PROCEDURE — 1180000000 HC REHAB R&B

## 2023-12-28 PROCEDURE — 97535 SELF CARE MNGMENT TRAINING: CPT

## 2023-12-28 PROCEDURE — 97129 THER IVNTJ 1ST 15 MIN: CPT

## 2023-12-28 PROCEDURE — 97530 THERAPEUTIC ACTIVITIES: CPT

## 2023-12-28 PROCEDURE — 97110 THERAPEUTIC EXERCISES: CPT

## 2023-12-28 PROCEDURE — 99232 SBSQ HOSP IP/OBS MODERATE 35: CPT | Performed by: PHYSICAL MEDICINE & REHABILITATION

## 2023-12-28 PROCEDURE — 97130 THER IVNTJ EA ADDL 15 MIN: CPT

## 2023-12-28 PROCEDURE — 6370000000 HC RX 637 (ALT 250 FOR IP): Performed by: PHYSICAL MEDICINE & REHABILITATION

## 2023-12-28 PROCEDURE — 99232 SBSQ HOSP IP/OBS MODERATE 35: CPT | Performed by: INTERNAL MEDICINE

## 2023-12-28 PROCEDURE — 6370000000 HC RX 637 (ALT 250 FOR IP): Performed by: NURSE PRACTITIONER

## 2023-12-28 PROCEDURE — 97116 GAIT TRAINING THERAPY: CPT

## 2023-12-28 RX ADMIN — AMLODIPINE BESYLATE 5 MG: 5 TABLET ORAL at 10:30

## 2023-12-28 RX ADMIN — CALCITRIOL CAPSULES 0.25 MCG 0.25 MCG: 0.25 CAPSULE ORAL at 10:30

## 2023-12-28 RX ADMIN — TRAMADOL HYDROCHLORIDE 50 MG: 50 TABLET, COATED ORAL at 20:13

## 2023-12-28 RX ADMIN — CLOPIDOGREL BISULFATE 75 MG: 75 TABLET ORAL at 10:30

## 2023-12-28 RX ADMIN — DOCUSATE SODIUM 100 MG: 100 CAPSULE, LIQUID FILLED ORAL at 10:30

## 2023-12-28 RX ADMIN — FAMOTIDINE 10 MG: 20 TABLET ORAL at 10:30

## 2023-12-28 RX ADMIN — CETIRIZINE HYDROCHLORIDE 5 MG: 5 TABLET ORAL at 10:30

## 2023-12-28 RX ADMIN — Medication 6 MG: at 20:13

## 2023-12-28 RX ADMIN — FOLIC ACID 500 MCG: 1 TABLET ORAL at 10:30

## 2023-12-28 RX ADMIN — SEVELAMER CARBONATE 800 MG: 800 TABLET, FILM COATED ORAL at 10:30

## 2023-12-28 RX ADMIN — ATORVASTATIN CALCIUM 10 MG: 10 TABLET, FILM COATED ORAL at 20:12

## 2023-12-28 RX ADMIN — MONTELUKAST SODIUM 10 MG: 10 TABLET ORAL at 10:30

## 2023-12-28 RX ADMIN — TRAZODONE HYDROCHLORIDE 50 MG: 50 TABLET ORAL at 20:12

## 2023-12-28 ASSESSMENT — PAIN SCALES - GENERAL
PAINLEVEL_OUTOF10: 0
PAINLEVEL_OUTOF10: 0

## 2023-12-28 NOTE — PLAN OF CARE
Problem: Discharge Planning  Goal: Discharge to home or other facility with appropriate resources  Outcome: Progressing  Flowsheets (Taken 12/28/2023 1700)  Discharge to home or other facility with appropriate resources:   Identify barriers to discharge with patient and caregiver   Identify discharge learning needs (meds, wound care, etc)   Refer to discharge planning if patient needs post-hospital services based on physician order or complex needs related to functional status, cognitive ability or social support system   Arrange for interpreters to assist at discharge as needed  Note: Patient to be discharged 1/5 home with  and outpatient therapies.     Problem: Safety - Adult  Goal: Free from fall injury  Outcome: Progressing  Flowsheets (Taken 12/28/2023 1700)  Free From Fall Injury:   Instruct family/caregiver on patient safety   Based on caregiver fall risk screen, instruct family/caregiver to ask for assistance with transferring infant if caregiver noted to have fall risk factors  Note: Patient remains free from falls at this time.     Problem: ABCDS Injury Assessment  Goal: Absence of physical injury  Outcome: Progressing  Flowsheets (Taken 12/28/2023 1700)  Absence of Physical Injury: Implement safety measures based on patient assessment     Problem: Pain  Goal: Verbalizes/displays adequate comfort level or baseline comfort level  Outcome: Progressing  Flowsheets (Taken 12/28/2023 1700)  Verbalizes/displays adequate comfort level or baseline comfort level:   Encourage patient to monitor pain and request assistance   Assess pain using appropriate pain scale   Administer analgesics based on type and severity of pain and evaluate response   Notify Licensed Independent Practitioner if interventions unsuccessful or patient reports new pain  Note: Patients pain is controlled with the current pain regimen     Problem: Chronic Conditions and Co-morbidities  Goal: Patient's chronic conditions and  co-morbidity symptoms are monitored and maintained or improved  Outcome: Progressing  Flowsheets (Taken 12/28/2023 1700)  Care Plan - Patient's Chronic Conditions and Co-Morbidity Symptoms are Monitored and Maintained or Improved:   Monitor and assess patient's chronic conditions and comorbid symptoms for stability, deterioration, or improvement   Collaborate with multidisciplinary team to address chronic and comorbid conditions and prevent exacerbation or deterioration   Update acute care plan with appropriate goals if chronic or comorbid symptoms are exacerbated and prevent overall improvement and discharge     Problem: Musculoskeletal - Adult  Goal: Return ADL status to a safe level of function  Outcome: Progressing  Flowsheets (Taken 12/28/2023 1700)  Return ADL Status to a Safe Level of Function:   Administer medication as ordered   Assess activities of daily living deficits and provide assistive devices as needed   Assist and instruct patient to increase activity and self care as tolerated     Problem: Metabolic/Fluid and Electrolytes - Adult  Goal: Electrolytes maintained within normal limits  Outcome: Progressing  Flowsheets (Taken 12/28/2023 1700)  Electrolytes maintained within normal limits: Monitor labs and assess patient for signs and symptoms of electrolyte imbalances     Problem: Skin/Tissue Integrity  Goal: Absence of new skin breakdown  Description: 1.  Monitor for areas of redness and/or skin breakdown  2.  Assess vascular access sites hourly  3.  Every 4-6 hours minimum:  Change oxygen saturation probe site  4.  Every 4-6 hours:  If on nasal continuous positive airway pressure, respiratory therapy assess nares and determine need for appliance change or resting period.  Outcome: Progressing  Note: No new skin breakdown noted at this time.     Problem: Nutrition Deficit:  Goal: Optimize nutritional status  12/28/2023 1700 by Simona Bah RN  Outcome: Progressing  Flowsheets (Taken 12/28/2023

## 2023-12-28 NOTE — CARE COORDINATION
Patient educated on how to use incentive spirometer. Patient verbalized understanding and demonstrated proper use. Emphasized importance and usage of device, with coughing and deep breathing every shift while awake.

## 2023-12-28 NOTE — PROGRESS NOTES
Comprehensive Nutrition Assessment    Type and Reason for Visit:  Reassess    Nutrition Recommendations/Plan:   Recommend continue current diet, Nepro BID.  Consider renal diet restriction once good po intake established.  Consider renal MVI.  Encouraged po, good nutrition at best efforts.     Malnutrition Assessment:  Malnutrition Status:  Moderate malnutrition (12/22/23 0914)    Context:  Chronic Illness     Findings of the 6 clinical characteristics of malnutrition:  Energy Intake:  75% or less estimated energy requirements for 1 month or longer (per pt report)  Weight Loss:  No significant weight loss (question accuracy of admit weight?)     Body Fat Loss:  Mild body fat loss (Moderate) Orbital, Triceps, Fat Overlying Ribs   Muscle Mass Loss:   (Moderate) Temples (temporalis), Clavicles (pectoralis & deltoids)  Fluid Accumulation:  No significant fluid accumulation Extremities   Strength:  Not Performed    Nutrition Assessment:      Pt. moderately malnourished AEB criteria as listed above. At risk for further nutrition compromise r/t admit d/t fall with compression fracture s/p kyphoplasty on 12/18, increased nutrient needs to aid in wound healing and underlying medical condition (Hx: CKD, CHF, CVA. Depression., Fibromyalgia, Hydronephrosis, hemiplegia, HTN, HLD, Osteoporosis).         Nutrition Related Findings:      Wound Type: Surgical Incision (s/p kyphoplasty on 12/18/23)     Pt. Report/Treatments/Miscellaneous: pt. Seen - reports good appetite and intake (intake variable per graphics); states acceptance of ONS - admits that she hasn't been drinking much Nepro but that she will; SLP following; denies any trouble tolerating diet  GI Status: BM 12/26  Pertinent Labs: 12/26: BUN 53, Cr 6.0, Potassium 4.3  Pertinent Meds: Folvite, Linzess, Colace, Glycolax, Senokot      Current Nutrition Intake & Therapies:    Average Meal Intake: 1-25%, 26-50%, 51-75%, %  Average Supplements Intake:  (states  acceptance)  ADULT DIET; Regular  ADULT ORAL NUTRITION SUPPLEMENT; Dinner, Lunch; Renal Oral Supplement    Anthropometric Measures:  Height: 152.4 cm (5')  Ideal Body Weight (IBW): 100 lbs (45 kg)    Admission Body Weight: 47 kg (103 lb 9.9 oz) (12/21; +trace; non-pitting edema BLE)  Current Body Weight: 48.6 kg (107 lb 2.3 oz) (12/27 no edema),     Current BMI (kg/m2): 20.9  Usual Body Weight:  (100 lbs per pt report. Per EMR: 93 lb 11.1 oz on 12/18/23; 94 lb 9.6 oz on 9/19/23; 114 lbs 10 oz on 12/24/22)                       BMI Categories: Normal Weight (BMI 18.5-24.9)    Estimated Daily Nutrient Needs:  Energy Requirements Based On: Kcal/kg  Weight Used for Energy Requirements: Current (50.8 kg on 12/22)  Energy (kcal/day): 4108-4278 kcal/dsay (25-30 kcal/kg)  Weight Used for Protein Requirements: Current (50.8 kg on 12/22)  Protein (g/day): 60+ g/day (1.2+ g/kg)         Nutrition Diagnosis:   Moderate malnutrition, In context of chronic illness related to inadequate protein-energy intake as evidenced by Criteria as identified in malnutrition assessment    Nutrition Interventions:   Food and/or Nutrient Delivery: Continue Current Diet, Continue Oral Nutrition Supplement  Nutrition Education/Counseling: Education initiated (Encouraged po intake of meals and ONS at best effort)  Coordination of Nutrition Care: Continue to monitor while inpatient       Goals:  Previous Goal Met: Progressing toward Goal(s)  Goals: PO intake 75% or greater, by next RD assessment       Nutrition Monitoring and Evaluation:      Food/Nutrient Intake Outcomes: Diet Advancement/Tolerance, Food and Nutrient Intake, Supplement Intake, Vitamin/Mineral Intake  Physical Signs/Symptoms Outcomes: Biochemical Data, Chewing or Swallowing, GI Status, Fluid Status or Edema, Nutrition Focused Physical Findings, Skin, Weight    Discharge Planning:    Too soon to determine     Lurdes Lee, RD, LD  Contact: [phone]

## 2023-12-28 NOTE — PROGRESS NOTES
3 completed shifts:  In: 1197 [P.O.:797]  Out: 1400   No intake/output data recorded.    BP (!) 144/68   Pulse 97   Temp 98.4 °F (36.9 °C) (Oral)   Resp 17   Ht 1.524 m (5')   Wt 48.6 kg (107 lb 2.3 oz)   SpO2 99%   BMI 20.93 kg/m²     General appearance: alert, appears stated age, and cooperative  Head: Normocephalic, without obvious abnormality, atraumatic  Lungs: clear to auscultation bilaterally  Heart: regular rate and rhythm, S1, S2 normal, no murmur, click, rub or gallop  Abdomen: soft, non-tender; bowel sounds normal; no masses,  no organomegaly  Extremities: extremities normal, atraumatic, no cyanosis or edema  Skin: Skin color, texture, turgor normal. No rashes or lesions  Neurologic:  weak    Electronically signed by Gareth Mosquera MD on 12/28/2023 at 8:54 AM

## 2023-12-28 NOTE — PROGRESS NOTES
[facility]  INPATIENT PHYSICAL THERAPY  DAILY NOTE  [facility] - [id number]-02/002-A    Time In: 1108  Time Out: 1238  Timed Code Treatment Minutes: 90 Minutes  Minutes: 90          Date: 2023  Patient Name: Deloris Watts,  Gender:  female        MRN: [id number]  : 1955  (68 y.o.)  Referral Date : 23  Referring Practitioner: Holly Whitaker APRN - CNP  Diagnosis: Traumatic compression fracture of L2 lumbar vertebra, closed, initial encounte  Treatment Diagnosis: difficulty in walking  Additional Pertinent Hx: Per H&P:Deloris Watts is a 68 y.o. right-handed  female with history of hypertension, hyperlipidemia, hydronephrosis, fibromyalgia, irritable bowel syndrome, osteoporosis, end-stage renal disease on hemodialysis since 2023, stroke with left side weakness in , anemia, depression, anxiety, right wrist and right ankle fracture treated conservatively, right proximal humeral fracture due to fall requiring ORIF, status post right carpal tunnel release surgery, status post cervical spine surgery, status post hysterectomy and bilateral oophorectomy, hemorrhoidectomy, sinus surgery, tubal ligation, LASEK surgery, is admitted to the inpatient rehabilitation unit on 2023 for intensive inpatient rehabilitation treatment of impairment and disability secondary to fall accident resulting L2 compression fracture requiring kyphoplasty, and liver laceration.She was found to have new L2 inferior endplate fracture, and hepatic laceration/contusion.  Nephrology was consulted to continue her hemodialysis.  MRI of thoracic and lumbar spine were ordered and performed on 12/15/2023.  Lumbar spine MRI done on 12/15/2023 confirmed the presence of acute L2 20% inferior endplate compression fracture without displacement of the fragment.  Thoracic spine MRI revealed no fracture or disc protrusion, and normal spinal cord. Neurosurgery was consulted on  12/16/2023 for L2 compression fracture and kyphoplasty was planned.  On 12/18/2023 the patient underwent L2 kyphoplasty for pathological L2 compression fracture by Dr. Nimisha Garza.  Patient transferred to inpatient rehab on 12/21/23.     Prior Level of Function:  Lives With: Spouse  Type of Home: House  Home Layout: One level  Home Access: Stairs to enter without rails  Entrance Stairs - Number of Steps: 2 MARIANA  Home Equipment: Walker, 4 wheeled, Wheelchair-manual, Walker, rolling, Grab bars, Reacher (transport w/c)   Bathroom Shower/Tub: Tub/Shower unit, Shower chair with back, Curtain  Bathroom Toilet: Handicap height  Bathroom Equipment: Shower chair, Hand-held shower, Grab bars in shower  Bathroom Accessibility: Accessible    ADL Assistance: Independent  Homemaking Assistance: Needs assistance (light meal prep, laundry.  Spouse performs other IADLs)  Homemaking Responsibilities: Yes  Ambulation Assistance: Independent  Transfer Assistance: Independent  Active : No  IADL Comments: Pt's spouse does most of the cooking and cleaning d/t poor standing endurance  Additional Comments: Pt. reports mod I with ADLs/mobility and transfers with use of rollator.  Pt.'s spouse assists with IADLs.  Spouse is very supportive and able to help as needed.    Restrictions/Precautions:  Restrictions/Precautions: Fall Risk, General Precautions  Position Activity Restriction  Other position/activity restrictions: Pt. s/p kyphoplasty, dialysis MWF with port in R chest region     SUBJECTIVE: Patient in room in wheelchair, agreeable to PT.  Pt reports her stomach has been upset this morning and she has had lots of loose stools     PAIN: stomach, not rated    Vitals: Vitals not assessed per clinical judgement, see nursing flowsheet    OBJECTIVE:  Bed Mobility:  Not Tested    Transfers:  Sit to Stand: Stand By Assistance  Stand to Sit:Stand By Assistance  *verbal cues for hand placement and to lock 4WW brakes  Patient completed sit < >

## 2023-12-28 NOTE — PROGRESS NOTES
[facility]   Nephrology progress note  12/28/2023, 9:34 AM      Pt Name:    Deloris Watts  MRN:     [id number]     YOB: 1955  Admit Date:    12/21/2023  1:13 PM    Chief Complaint: Nephrology following for ESRD and hemodialysis    Subjective:  Patient was seen and examined this morning  No chest pain or shortness of breath  Eating and drinking well    Objective:  24HR INTAKE/OUTPUT:    Intake/Output Summary (Last 24 hours) at 12/28/2023 0934  Last data filed at 12/28/2023 0907  Gross per 24 hour   Intake 1600 ml   Output 1400 ml   Net 200 ml        Admission weight: 47 kg (103 lb 9.9 oz)  Wt Readings from Last 3 Encounters:   12/27/23 48.6 kg (107 lb 2.3 oz)   12/20/23 47 kg (103 lb 9.9 oz)   12/15/23 45.4 kg (100 lb)        Vitals :   Vitals:    12/27/23 0810 12/27/23 1150 12/27/23 1437 12/27/23 2151   BP: (!) 151/82 (!) 165/61 138/77 (!) 144/68   Pulse: 89 87 77 97   Resp: 18 18 18 17   Temp: 97.7 °F (36.5 °C) 97.1 °F (36.2 °C) 97 °F (36.1 °C) 98.4 °F (36.9 °C)   TempSrc: Oral   Oral   SpO2: 100%   99%   Weight:  49.6 kg (109 lb 5.6 oz) 48.6 kg (107 lb 2.3 oz)    Height:           Physical examination  General Appearance: Awake and alert no no distress  Mouth/Throat: Oral mucosa moist  Neck: Accessory muscle  Lungs: Clear  Heart:  S1, S2 heard  GI: soft, non-tender, no guarding  Extremities no edema    Medications:  Infusion:    sodium chloride       Meds:    diclofenac sodium  2 g Topical 4x Daily    traZODone  50 mg Oral Nightly    sevelamer  800 mg Oral TID WC    senna  2 tablet Oral Nightly    polyethylene glycol  17 g Oral BID    montelukast  10 mg Oral Daily    folic acid  500 mcg Oral Daily    fluticasone  2 spray Each Nostril Daily    famotidine  10 mg Oral Daily    docusate sodium  1 enema Rectal Daily    docusate sodium  100 mg Oral BID    cetirizine  5 mg Oral Daily    calcitRIOL  0.25 mcg Oral Daily    atorvastatin  10 mg Oral Nightly    amLODIPine  5 mg Oral Daily

## 2023-12-28 NOTE — PROGRESS NOTES
[facility]  Recreational Therapy  Daily Note  [facility]    Time Spent with Patient: 25 minutes    Date:  12/28/2023       Patient Name: Deloris Watts      MRN: [id number]      YOB: 1955 (68 y.o.)       Gender: female  Diagnosis: traumatic compression fracture of L2 lumbar vertebra, closed initial encounter  Referring Practitioner: ordering:  Holly Whitaker APRN - CNP, attending: Jax Whatley MD    RESTRICTIONS/PRECAUTIONS:  Restrictions/Precautions: Fall Risk, General Precautions     Hearing: Within functional limits    PAIN: 0    SUBJECTIVE:  they are all beautiful    OBJECTIVE:  Via w/c pt voted for her favorite therapist russell lockrhiannon-states they were all beautiful but voted for her favorite-very pleasant and enjoyed them-she took pictures of some of them to try to do at home sometime          Patient Education  New Education Provided: Importance of Leisure,     Electronically signed by: Elena Tan, CTRS  Date: 12/28/2023

## 2023-12-28 NOTE — PLAN OF CARE
Problem: Safety - Adult  Goal: Free from fall injury  12/28/2023 0044 by Brandy Quevedo RN  Outcome: Progressing     Problem: ABCDS Injury Assessment  Goal: Absence of physical injury  Outcome: Progressing     Problem: Pain  Goal: Verbalizes/displays adequate comfort level or baseline comfort level  12/28/2023 0044 by Brandy Quevedo RN  Outcome: Progressing  Flowsheets (Taken 12/27/2023 2151)  Verbalizes/displays adequate comfort level or baseline comfort level: Encourage patient to monitor pain and request assistance    Care plan reviewed with patient and verbalizes understanding of the plan of care and contribute to goal setting.

## 2023-12-28 NOTE — PROGRESS NOTES
[facility]  Occupational Therapy  Daily Note  Time:   Time In: 0900  Time Out: 1000  Timed Code Treatment Minutes: 60 Minutes  Minutes: 60          Date: 2023  Patient Name: Deloris Watts,   Gender: female      Room: [facility]02/002-A  MRN: [id number]  : 1955  (68 y.o.)  Referring Practitioner: ordering:  Holly Whitaker APRN - CNP, attending: Jax Whatley MD  Diagnosis: traumatic compression fracture of L2 lumbar vertebra, closed initial encounter  Additional Pertinent Hx: Deloris is a 68 year old female whom presented to [facility] ED on 12/15/23 s/p fall on 23.  It is noted that pt has demonstrated reduced balance.  CT imaging of brain completed with no abnormalities, CT of cervical spine indicated fusion at C5-7 with no new abnormalities, CT of lumbar spine shows new inferior endplate fx at L2 and multiple levels of foraminal stenosis.  CT of abdomen/pelvis noted suggestive hepatic laceration/contusion.  It is noted pt has mild TTP of abdomen.  Pt. Underwent an L2 kyphoplasty on 23.  Pt. transferred to the IPR unit on 23 for further medical care and referred to silled OT services.    Restrictions/Precautions:  Restrictions/Precautions: Fall Risk, General Precautions  Position Activity Restriction  Other position/activity restrictions: Pt. s/p kyphoplasty, dialysis MWF with port in R chest region     SUBJECTIVE: Pt sitting in bedside chair upon arrival. Pt pleasant and agreeable to OT session. Pt upset this date and states her sister called her last night and told her she never wanted to speak to her again. Pt states she has been waiting for her to call and apologize and states she thinks she is serious.     PAIN: soreness where new port was placed however not rated.    Vitals: Vitals not assessed per clinical judgement, see nursing flowsheet    COGNITION: Slow Processing, Decreased Recall, Decreased Insight,

## 2023-12-28 NOTE — PROGRESS NOTES
[facility]  INPATIENT SPEECH THERAPY  [facility]  DAILY NOTE    TIME   SLP Individual Minutes  Time In: 0800  Time Out: 0830  Minutes: 30  Timed Code Treatment Minutes: 30 Minutes       Date: 2023  Patient Name: Deloris Watts      CSN: [id number]   : 1955  (68 y.o.)  Gender: female   Referring Physician:  Holly Whitaker APRN - CNP  Diagnosis: Traumatic compression fracture of L2 lumbar vertebra, closed, initial encounter  Precautions: fall risk  Current Diet: Regular Diet with Thin Liquids  Respiratory Status: Room Air  Swallowing Strategies: Standard Universal Swallow Precautions  Date of Last MBS/FEES: Not Applicable    Pain:  No pain reported.    Subjective: Patient sleeping in bed upon ST arrival, easily awakened by name. Agreeable to skilled ST services. No family present. ST assisted patient to restroom at beginning of session. Pleasant and cooperative throughout.     Short-Term Goals:  SHORT TERM GOAL #1:  Goal 1: Patient will complete immediate/delayed recall tasks with 70% accuracy given min cues to improve retention of novel and newly presented information.   INTERVENTIONS:    Generate/Recall - To-Do List  (Get dressed, do nails, use restroom, go for walk, go to gift shop)  Immediate recall: 3/5 independent; 2/5 with written visual  Delayed (~20 min) recall: 5/5 independent    *patient utilized write it down and repeat it    SHORT TERM GOAL #2:  Goal 2: Patient will complete problem solving, visual/verbal reasoning, executive functioning tasks with 70% given min cues to improve return to IADLs/ADLs.  INTERVENTIONS:    Prescription Label Interpretation  Label 1:  independent  Label 2:  independent    *good comprehension of prescription labels  *recommend patient's  continue to organize medications into pillbox upon discharge      Safety Awareness  Decreased safety awareness with transfer to restroom. Patient standing from bed prior

## 2023-12-28 NOTE — PROGRESS NOTES
[facility]  Recreational Therapy  Daily Note  [facility]    Time Spent with Patient: 25 minutes    Date:  12/28/2023       Patient Name: Deloris Watts      MRN: [id number]      YOB: 1955 (68 y.o.)       Gender: female  Diagnosis: traumatic compression fracture of L2 lumbar vertebra, closed initial encounter  Referring Practitioner: ordering:  Holly Whitaker APRN - CNP, attending: Jax Whatley MD    RESTRICTIONS/PRECAUTIONS:  Restrictions/Precautions: Fall Risk, General Precautions     Hearing: Within functional limits    PAIN: 0    SUBJECTIVE:  I appreciate this     OBJECTIVE:  Pt enjoyed getting her hair washed, trimmed and styled by our beautician-affect bright and social-appreciative          Patient Education  New Education Provided: Importance of Leisure,     Electronically signed by: Elena Tan CTRS  Date: 12/28/2023

## 2023-12-29 LAB
ANION GAP SERPL CALC-SCNC: 14 MEQ/L (ref 8–16)
BUN SERPL-MCNC: 61 MG/DL (ref 7–22)
CALCIUM SERPL-MCNC: 9.9 MG/DL (ref 8.5–10.5)
CHLORIDE SERPL-SCNC: 105 MEQ/L (ref 98–111)
CO2 SERPL-SCNC: 24 MEQ/L (ref 23–33)
CREAT SERPL-MCNC: 6.1 MG/DL (ref 0.4–1.2)
GFR SERPL CREATININE-BSD FRML MDRD: 7 ML/MIN/1.73M2
GLUCOSE SERPL-MCNC: 91 MG/DL (ref 70–108)
POTASSIUM SERPL-SCNC: 5 MEQ/L (ref 3.5–5.2)
SODIUM SERPL-SCNC: 143 MEQ/L (ref 135–145)

## 2023-12-29 PROCEDURE — 97535 SELF CARE MNGMENT TRAINING: CPT

## 2023-12-29 PROCEDURE — 99232 SBSQ HOSP IP/OBS MODERATE 35: CPT | Performed by: INTERNAL MEDICINE

## 2023-12-29 PROCEDURE — 90935 HEMODIALYSIS ONE EVALUATION: CPT

## 2023-12-29 PROCEDURE — 6370000000 HC RX 637 (ALT 250 FOR IP): Performed by: NURSE PRACTITIONER

## 2023-12-29 PROCEDURE — 99232 SBSQ HOSP IP/OBS MODERATE 35: CPT | Performed by: PHYSICAL MEDICINE & REHABILITATION

## 2023-12-29 PROCEDURE — 1180000000 HC REHAB R&B

## 2023-12-29 PROCEDURE — 97129 THER IVNTJ 1ST 15 MIN: CPT | Performed by: SPEECH-LANGUAGE PATHOLOGIST

## 2023-12-29 PROCEDURE — 6370000000 HC RX 637 (ALT 250 FOR IP): Performed by: PHYSICAL MEDICINE & REHABILITATION

## 2023-12-29 PROCEDURE — 97112 NEUROMUSCULAR REEDUCATION: CPT

## 2023-12-29 PROCEDURE — 97530 THERAPEUTIC ACTIVITIES: CPT

## 2023-12-29 PROCEDURE — 97110 THERAPEUTIC EXERCISES: CPT

## 2023-12-29 PROCEDURE — 80048 BASIC METABOLIC PNL TOTAL CA: CPT

## 2023-12-29 PROCEDURE — 36415 COLL VENOUS BLD VENIPUNCTURE: CPT

## 2023-12-29 PROCEDURE — 97130 THER IVNTJ EA ADDL 15 MIN: CPT | Performed by: SPEECH-LANGUAGE PATHOLOGIST

## 2023-12-29 PROCEDURE — 97116 GAIT TRAINING THERAPY: CPT

## 2023-12-29 RX ADMIN — SEVELAMER CARBONATE 800 MG: 800 TABLET, FILM COATED ORAL at 08:52

## 2023-12-29 RX ADMIN — Medication 6 MG: at 22:35

## 2023-12-29 RX ADMIN — SEVELAMER CARBONATE 800 MG: 800 TABLET, FILM COATED ORAL at 12:12

## 2023-12-29 RX ADMIN — FOLIC ACID 500 MCG: 1 TABLET ORAL at 08:53

## 2023-12-29 RX ADMIN — SEVELAMER CARBONATE 800 MG: 800 TABLET, FILM COATED ORAL at 16:48

## 2023-12-29 RX ADMIN — CLOPIDOGREL BISULFATE 75 MG: 75 TABLET ORAL at 08:53

## 2023-12-29 RX ADMIN — MONTELUKAST SODIUM 10 MG: 10 TABLET ORAL at 08:53

## 2023-12-29 RX ADMIN — TRAZODONE HYDROCHLORIDE 50 MG: 50 TABLET ORAL at 22:34

## 2023-12-29 RX ADMIN — ATORVASTATIN CALCIUM 10 MG: 10 TABLET, FILM COATED ORAL at 22:34

## 2023-12-29 RX ADMIN — CALCITRIOL CAPSULES 0.25 MCG 0.25 MCG: 0.25 CAPSULE ORAL at 08:53

## 2023-12-29 RX ADMIN — TRAMADOL HYDROCHLORIDE 50 MG: 50 TABLET, COATED ORAL at 16:13

## 2023-12-29 RX ADMIN — FAMOTIDINE 10 MG: 20 TABLET ORAL at 08:53

## 2023-12-29 RX ADMIN — DOCUSATE SODIUM 100 MG: 100 CAPSULE, LIQUID FILLED ORAL at 08:53

## 2023-12-29 RX ADMIN — CETIRIZINE HYDROCHLORIDE 5 MG: 5 TABLET ORAL at 08:54

## 2023-12-29 RX ADMIN — AMLODIPINE BESYLATE 5 MG: 5 TABLET ORAL at 08:53

## 2023-12-29 ASSESSMENT — PAIN - FUNCTIONAL ASSESSMENT: PAIN_FUNCTIONAL_ASSESSMENT: ACTIVITIES ARE NOT PREVENTED

## 2023-12-29 ASSESSMENT — PAIN SCALES - WONG BAKER: WONGBAKER_NUMERICALRESPONSE: 0

## 2023-12-29 ASSESSMENT — PAIN DESCRIPTION - ORIENTATION: ORIENTATION: OTHER (COMMENT)

## 2023-12-29 ASSESSMENT — PAIN SCALES - GENERAL
PAINLEVEL_OUTOF10: 5
PAINLEVEL_OUTOF10: 0
PAINLEVEL_OUTOF10: 8

## 2023-12-29 ASSESSMENT — PAIN DESCRIPTION - LOCATION: LOCATION: HEAD

## 2023-12-29 NOTE — PROGRESS NOTES
[facility]  Occupational Therapy  Daily Note  Time:    Time In: 730  Time Out: 830  Timed Code Treatment Minutes: 60 Minutes  Minutes: 60          Date: 2023  Patient Name: Deloris Watts,   Gender: female      Room: [facility]02/002-A  MRN: [id number]  : 1955  (68 y.o.)  Referring Practitioner: ordering:  Holly Whitaker APRN - CNP, attending: Jax Whatley MD  Diagnosis: traumatic compression fracture of L2 lumbar vertebra, closed initial encounter  Additional Pertinent Hx: Deloris is a 68 year old female whom presented to [facility] ED on 12/15/23 s/p fall on 23.  It is noted that pt has demonstrated reduced balance.  CT imaging of brain completed with no abnormalities, CT of cervical spine indicated fusion at C5-7 with no new abnormalities, CT of lumbar spine shows new inferior endplate fx at L2 and multiple levels of foraminal stenosis.  CT of abdomen/pelvis noted suggestive hepatic laceration/contusion.  It is noted pt has mild TTP of abdomen.  Pt. Underwent an L2 kyphoplasty on 23.  Pt. transferred to the IPR unit on 23 for further medical care and referred to [facility] OT services.    Restrictions/Precautions:  Restrictions/Precautions: Fall Risk, General Precautions  Position Activity Restriction  Other position/activity restrictions: Pt. s/p kyphoplasty, dialysis MWF with port in R chest region      SUBJECTIVE: Upon arrival pt resting in bed and agreeable to OT session.      PAIN: pt denies pain    Vitals: Vitals not assessed per clinical judgement, see nursing flowsheet    COGNITION: Decreased Safety Awareness    ADL:   Self Feed: Mod I as breakfast arrived during session.    Grooming: Mod I seated to don deodorant and to comb hair.     Bathing: Sponge bathing performed this date at sinkside, pt seated for majority of task with set up assistance seated and supervision during intermittent standing during

## 2023-12-29 NOTE — PLAN OF CARE
Problem: Discharge Planning  Goal: Discharge to home or other facility with appropriate resources  12/29/2023 0049 by Karime Browning LPN  Outcome: Progressing  Flowsheets (Taken 12/28/2023 1700 by Simona Bah, RN)  Discharge to home or other facility with appropriate resources:   Identify barriers to discharge with patient and caregiver   Identify discharge learning needs (meds, wound care, etc)   Refer to discharge planning if patient needs post-hospital services based on physician order or complex needs related to functional status, cognitive ability or social support system   Arrange for interpreters to assist at discharge as needed  Note: Patient continues to progress with therapy. Social service working on discharge needs. Patient plans to discharge 1/3/2024.     Problem: Safety - Adult  Goal: Free from fall injury  12/29/2023 0049 by Karime Browning LPN  Outcome: Progressing  Flowsheets (Taken 12/28/2023 1700 by Simona Bah, RN)  Free From Fall Injury:   Instruct family/caregiver on patient safety   Based on caregiver fall risk screen, instruct family/caregiver to ask for assistance with transferring infant if caregiver noted to have fall risk factors  Note: No falls sustained at this time. Patient alert to call light and uses appropriately to alert staff of needs. Bed/chair alarm in use.       Problem: Skin/Tissue Integrity  Goal: Absence of new skin breakdown  Description: 1.  Monitor for areas of redness and/or skin breakdown  2.  Assess vascular access sites hourly  3.  Every 4-6 hours minimum:  Change oxygen saturation probe site  4.  Every 4-6 hours:  If on nasal continuous positive airway pressure, respiratory therapy assess nares and determine need for appliance change or resting period.  12/29/2023 0049 by Karime Browning LPN  Outcome: Progressing  Note: No new skin issues noted this shift.      Care plan reviewed with patient.  Patient verbalizes understanding of the  plan of care and continues to contribute to progression towards goals.

## 2023-12-29 NOTE — PROGRESS NOTES
[facility]  INPATIENT PHYSICAL THERAPY  [facility] - [id number]-02/002-A  Daily Note    Time In: 0900  Time Out: 1030  Timed Code Treatment Minutes: 90 Minutes  Minutes: 90          Date: 2023  Patient Name: Deloris Watts,  Gender:  female        MRN: [id number]  : 1955  (68 y.o.)  Referral Date : 23  Referring Practitioner: Holly Whitaker APRN - CNP  Diagnosis: Traumatic compression fracture of L2 lumbar vertebra, closed, initial encounte  Treatment Diagnosis: difficulty in walking  Additional Pertinent Hx: Per H&P:Deloris Watts is a 68 y.o. right-handed  female with history of hypertension, hyperlipidemia, hydronephrosis, fibromyalgia, irritable bowel syndrome, osteoporosis, end-stage renal disease on hemodialysis since 2023, stroke with left side weakness in , anemia, depression, anxiety, right wrist and right ankle fracture treated conservatively, right proximal humeral fracture due to fall requiring ORIF, status post right carpal tunnel release surgery, status post cervical spine surgery, status post hysterectomy and bilateral oophorectomy, hemorrhoidectomy, sinus surgery, tubal ligation, LASEK surgery, is admitted to the inpatient rehabilitation unit on 2023 for intensive inpatient rehabilitation treatment of impairment and disability secondary to fall accident resulting L2 compression fracture requiring kyphoplasty, and liver laceration.She was found to have new L2 inferior endplate fracture, and hepatic laceration/contusion.  Nephrology was consulted to continue her hemodialysis.  MRI of thoracic and lumbar spine were ordered and performed on 12/15/2023.  Lumbar spine MRI done on 12/15/2023 confirmed the presence of acute L2 20% inferior endplate compression fracture without displacement of the fragment.  Thoracic spine MRI revealed no fracture or disc protrusion, and normal spinal cord. Neurosurgery was consulted on

## 2023-12-29 NOTE — FLOWSHEET NOTE
Stable 2.5 hour TX completed. Removed 1 L of fluid. pt tolerated fluid removal well. Bilateral cath ports flushed with normal saline, clamped, and secured with tegos. CVC drsg clean, dry, and intact. Report called to primary RN. TX record printed for scanning into EMR.   12/29/23 1227 12/29/23 1511   Vital Signs   BP (!) 164/75 134/89   Temp 98.3 °F (36.8 °C) 98.2 °F (36.8 °C)   Pulse 73 88   Respirations 19 20   SpO2 98 % 96 %   Weight - Scale 45.2 kg (99 lb 10.4 oz) 44.2 kg (97 lb 7.1 oz)   Weight Method  --  Bed scale   Percent Weight Change -7 -2.21   Post-Hemodialysis Assessment   Post-Treatment Procedures  --  Blood returned;Catheter Capped, clamped with Saline x2 ports   Machine Disinfection Process  --  Acid/Vinegar Clean;Heat Disinfect;Exterior Machine Disinfection   Blood Volume Processed (Liters)  --  50.7 L   Dialyzer Clearance  --  Lightly streaked   Duration of Treatment (minutes)  --  150 minutes   Heparin Amount Administered During Treatment (mL)  --  0 mL   Hemodialysis Intake (ml)  --  400 ml   Hemodialysis Output (ml)  --  1400 ml   NET Removed (ml)  --  1000   Tolerated Treatment  --  Good

## 2023-12-29 NOTE — PLAN OF CARE
Problem: Discharge Planning  Goal: Discharge to home or other facility with appropriate resources  12/29/2023 1237 by Simona Bah RN  Outcome: Progressing  Flowsheets (Taken 12/29/2023 1237)  Discharge to home or other facility with appropriate resources:   Identify barriers to discharge with patient and caregiver   Identify discharge learning needs (meds, wound care, etc)   Refer to discharge planning if patient needs post-hospital services based on physician order or complex needs related to functional status, cognitive ability or social support system   Arrange for needed discharge resources and transportation as appropriate  12/29/2023 0049 by Karime Browning LPN  Outcome: Progressing  Flowsheets (Taken 12/28/2023 1700 by Simona Bah RN)  Discharge to home or other facility with appropriate resources:   Identify barriers to discharge with patient and caregiver   Identify discharge learning needs (meds, wound care, etc)   Refer to discharge planning if patient needs post-hospital services based on physician order or complex needs related to functional status, cognitive ability or social support system   Arrange for interpreters to assist at discharge as needed  Note: Patient continues to progress with therapy. Social service working on discharge needs. Patient plans to discharge 1/3/2024.     Problem: Safety - Adult  Goal: Free from fall injury  12/29/2023 1237 by Simona Bah RN  Outcome: Progressing  Flowsheets (Taken 12/29/2023 1237)  Free From Fall Injury:   Instruct family/caregiver on patient safety   Based on caregiver fall risk screen, instruct family/caregiver to ask for assistance with transferring infant if caregiver noted to have fall risk factors  Note: Patient remains free from falls at this time.  12/29/2023 0049 by Karime Browning LPN  Outcome: Progressing  Flowsheets (Taken 12/28/2023 1700 by Simona Bah RN)  Free From Fall Injury:   Instruct  12/29/2023 1237)  Absence of urinary retention: Assess patient’s ability to void and empty bladder     Problem: Skin/Tissue Integrity  Goal: Absence of new skin breakdown  Description: 1.  Monitor for areas of redness and/or skin breakdown  2.  Assess vascular access sites hourly  3.  Every 4-6 hours minimum:  Change oxygen saturation probe site  4.  Every 4-6 hours:  If on nasal continuous positive airway pressure, respiratory therapy assess nares and determine need for appliance change or resting period.  12/29/2023 1237 by Simona Bah RN  Outcome: Progressing  Note: No new skin breakdown noted at this time.  12/29/2023 0049 by Karime Browning LPN  Outcome: Progressing  Note: No new skin issues noted this shift.      Problem: Nutrition Deficit:  Goal: Optimize nutritional status  Outcome: Progressing  Flowsheets (Taken 12/29/2023 1237)  Nutrient intake appropriate for improving, restoring, or maintaining nutritional needs: Assess nutritional status and recommend course of action

## 2023-12-29 NOTE — PROGRESS NOTES
[facility]   Nephrology progress note  12/29/2023, 11:01 AM      Pt Name:    Deloris Watts  MRN:     [id number]     YOB: 1955  Admit Date:    12/21/2023  1:13 PM    Chief Complaint: Nephrology following for ESRD and hemodialysis    Subjective:  Patient was seen and examined this morning  No chest pain or shortness of breath  Rehab going on well    Objective:  24HR INTAKE/OUTPUT:    Intake/Output Summary (Last 24 hours) at 12/29/2023 1101  Last data filed at 12/29/2023 0858  Gross per 24 hour   Intake 1030 ml   Output --   Net 1030 ml        Admission weight: 47 kg (103 lb 9.9 oz)  Wt Readings from Last 3 Encounters:   12/27/23 48.6 kg (107 lb 2.3 oz)   12/20/23 47 kg (103 lb 9.9 oz)   12/15/23 45.4 kg (100 lb)        Vitals :   Vitals:    12/28/23 2000 12/28/23 2043 12/29/23 0645 12/29/23 0846   BP: (!) 150/70  (!) 148/57 (!) 168/75   Pulse: 87  65 79   Resp: 18 16  19   Temp: 98.1 °F (36.7 °C)   97.5 °F (36.4 °C)   TempSrc: Oral   Oral   SpO2: 100%   99%   Weight:       Height:           Physical examination  General Appearance: Awake and alert no no distress  Mouth/Throat: Oral mucosa moist  Neck: Accessory muscle  Lungs: Clear  Heart:  S1, S2 heard  GI: soft, non-tender, no guarding  Extremities no edema    Medications:  Infusion:    sodium chloride       Meds:    diclofenac sodium  2 g Topical 4x Daily    traZODone  50 mg Oral Nightly    sevelamer  800 mg Oral TID WC    senna  2 tablet Oral Nightly    polyethylene glycol  17 g Oral BID    montelukast  10 mg Oral Daily    folic acid  500 mcg Oral Daily    fluticasone  2 spray Each Nostril Daily    famotidine  10 mg Oral Daily    docusate sodium  1 enema Rectal Daily    docusate sodium  100 mg Oral BID    cetirizine  5 mg Oral Daily    calcitRIOL  0.25 mcg Oral Daily    atorvastatin  10 mg Oral Nightly    amLODIPine  5 mg Oral Daily    linaclotide  290 mcg Oral QAM AC    melatonin  6 mg Oral Nightly    clopidogrel  75 mg Oral

## 2023-12-29 NOTE — PROGRESS NOTES
[facility]  INPATIENT SPEECH THERAPY  [facility]  DAILY NOTE    TIME   SLP Individual Minutes  Time In: 1030  Time Out: 1100  Minutes: 30  Timed Code Treatment Minutes: 30 Minutes       Date: 2023  Patient Name: Deloris Watts      CSN: [id number]   : 1955  (68 y.o.)  Gender: female   Referring Physician:  Holly Whitaker, APRN - CNP  Diagnosis: Traumatic compression fracture of L2 lumbar vertebra, closed, initial encounter  Precautions: fall risk  Current Diet: Regular Diet with Thin Liquids  Respiratory Status: Room Air  Swallowing Strategies: Standard Universal Swallow Precautions  Date of Last MBS/FEES: Not Applicable    Pain:  No pain reported.    Subjective: Patient positioned in recliner for duration of session. Alert and cooperative with all ST interventions.     Short-Term Goals:  SHORT TERM GOAL #1:  Goal 1: Patient will complete immediate/delayed recall tasks with 70% accuracy given min cues to improve retention of novel and newly presented information.   INTERVENTIONS:    Generate/Recall - To-Do List  (Get dressed, do nails, use restroom, go for walk, go to gift shop)  Immediate recall: 3/5 independent; 2/5 with written visual  Delayed (~24 hour) recall: 5/5 independent with independent use of written aid.     *patient utilized write it down and repeat it    SHORT TERM GOAL #2:  Goal 2: Patient will complete problem solving, visual/verbal reasoning, executive functioning tasks with 70% given min cues to improve return to IADLs/ADLs.  INTERVENTIONS:    Deductive reasoning tasks:    (Deloris's schedule):  indep  *Excellent comprehension, deductive thinking/reasoning and organization.  *Timely completion     ([place]):  indep,  with mod cues  *Mod cues to attend to pertinent details to aid in placement of information.   *Decreased comprehension/awareness of directional cues.     SHORT TERM GOAL #3:  Goal 3: Patient will complete

## 2023-12-29 NOTE — PROGRESS NOTES
[facility]  INPATIENT REHABILITATION  TEAM CONFERENCE NOTE    Conference Date: 2024  Admit Date:  2023  1:13 PM  Patient Name: Deloris Watts    MRN: [id number]    : 1955  (68 y.o.)  Rehabilitation Admitting Diagnosis:  Traumatic compression fracture of L2 lumbar vertebra, closed, initial encounter ([facility]) [S32.020A]  Referring Practitioner: Holly Whitaker APRN - CNP      CASE MANAGEMENT  Current issues/needs regarding patient and family discharge status: Patient continues to be motivated and progressing with therapy. Patient plans to be discharged on Friday,  with outpatient PT. SW will follow and maintain involvement in discharge planning.    PHYSICAL THERAPY  Assessment: Patient progressing toward established goals., meeting 4/5 short term goals   Pt has progressed to the S to SBA level with transfers and gait.  Balance continues to be limited scoring 16/28 on Tinetti, indicating high fall risk.  Pt will benefit from continued skilled PT to further advance in this area for improved safety.    Equipment Needed: No (has RW, rollator, wheelchair and transport chair)    SPEECH THERAPY  Summary: Patient has met 1/4 STG's and 0/1 LTG's s/t limited intervention from initial assessment. Achievement of 18/30 on MOCA indicative of mild cognitive impairment upon evaluation (2023) with limited gains in cognitive skills given short LOS. Patient continues to present with deficits in recall, though good use of compensatory strategies upon provision, problem solving, reasoning, executive functioning, sequencing, thought organization, and attention. Patient expressive and receptive language WFL for basic communicative interactions. Patient speech and voice WFL. Patient currently safely consuming regular diet with thin liquids; no dysphagia services rendered. Concerns remaining for safety with ADL/IADL completion, with no barriers to making further progress across cognitive domains.  Anticipate further progress with ongoing, intensive, skilled ST services via IPR setting. WithOUT ongoing, skilled ST services, suspect patient may demonstrate difficulty making successful return to PLOF. Patient would benefit from skilled ST services to address aforementioned skills with recommendations for HH ST vs. OP ST at discharge, no return to driving (baseline), and supervision with IADLs.    OCCUPATIONAL THERAPY  Assessment:Pt is making good progress towards goals. Pt has met 2/3 STGs and 1/3 LTGs. Pt has exhibited improvement in endurance, balance, strength, bilateral release however patient continues to demonstrate decreased safety awareness with fall prevention strategies causing barriers to meeting pt's goals. Patient requires SUP with all ADL/IADL activies however continues to require skilled OT intervention to improve ADL/IADL performance by modifying and adapting tasks required for pt to return home at [facility].   Other: Pt. has reacher, shower chair, elevated toilet, RW, rollator, w/c.    RECREATIONAL THERAPY  Patient has been offered participation in recreational therapy activities and participates as able. Pt states she had a stroke 20 yrs ago and after that they did not travel like they use to-pt has dialysis M-W-F-pt states she would like some word search puzzles, coloring materials, deck of cards to play with  in free time and she likes to make crafts-she  has a dog Sergio at home and would love to visit-RT will get the ok from [facility] vet for a visit-pt pleasant and social-going down for a medical test at this time -Pt enjoyed getting her hair washed, trimmed and styled by our beautician-affect bright and social-appreciative  -Via w/c pt voted for her favorite therapist russell lopez-states they were all beautiful but voted for her favorite-very pleasant and enjoyed them-she took pictures of some of them to try to do at home sometime       NUTRITION  Weight - Scale: 44.7 kg

## 2023-12-29 NOTE — PROGRESS NOTES
Physical Medicine & Rehabilitation Progress Note    Chief Complaint:  Low back pain, generalized weakness after a fall    Subjective:    Deloris Watts is a 68 y.o. right-handed  female with history of hypertension, hyperlipidemia, hydronephrosis, fibromyalgia, irritable bowel syndrome, osteoporosis, end-stage renal disease on hemodialysis since January 2023, stroke with left side weakness in 1990s, anemia, depression, anxiety, right wrist and right ankle fracture treated conservatively, right proximal humeral fracture due to fall requiring ORIF, status post right carpal tunnel release surgery, status post cervical spine surgery, status post hysterectomy and bilateral oophorectomy, hemorrhoidectomy, sinus surgery, tubal ligation, LASEK surgery, was admitted on 12/21/2023 for intensive inpatient management of impairment & disability secondary to fall accident resulting L2 compression fracture requiring kyphoplasty, and liver laceration.       The patient says she was trying to put on her glasses standing in front on her dresser in the bedroom when suddenly she lost balance and fell backward to the floor at night of 12/14/2023.  She immediately feels severe pain on her lower back.  She did not lose consciousness.  She managed to crawl back to her bed at the time.  Her  called 911 and EMS brought the patient to [facility] ER in early morning of 12/15/2023.  She complains of low back pain and some pain at her head in ER.  Her blood pressure was 175/94 in ER.  Her BUN/creatinine was 51/6.1.  At home she take aspirin and Plavix.  CT of the head, cervical spine, abdomen and pelvis, and lumbar spine were done on 12/15/2023.  She was found to have new L2 inferior endplate fracture, and hepatic laceration/contusion.  Nephrology was consulted to continue her hemodialysis.  MRI of thoracic and lumbar spine were ordered and performed on 12/15/2023.  Lumbar spine MRI done on 12/15/2023 confirmed

## 2023-12-30 LAB
ANION GAP SERPL CALC-SCNC: 10 MEQ/L (ref 8–16)
BUN SERPL-MCNC: 39 MG/DL (ref 7–22)
CALCIUM SERPL-MCNC: 10 MG/DL (ref 8.5–10.5)
CHLORIDE SERPL-SCNC: 102 MEQ/L (ref 98–111)
CO2 SERPL-SCNC: 26 MEQ/L (ref 23–33)
CREAT SERPL-MCNC: 4.2 MG/DL (ref 0.4–1.2)
GFR SERPL CREATININE-BSD FRML MDRD: 11 ML/MIN/1.73M2
GLUCOSE SERPL-MCNC: 94 MG/DL (ref 70–108)
POTASSIUM SERPL-SCNC: 4.8 MEQ/L (ref 3.5–5.2)
SODIUM SERPL-SCNC: 138 MEQ/L (ref 135–145)

## 2023-12-30 PROCEDURE — 97129 THER IVNTJ 1ST 15 MIN: CPT

## 2023-12-30 PROCEDURE — 6370000000 HC RX 637 (ALT 250 FOR IP): Performed by: PHYSICAL MEDICINE & REHABILITATION

## 2023-12-30 PROCEDURE — 36415 COLL VENOUS BLD VENIPUNCTURE: CPT

## 2023-12-30 PROCEDURE — 97116 GAIT TRAINING THERAPY: CPT

## 2023-12-30 PROCEDURE — 99232 SBSQ HOSP IP/OBS MODERATE 35: CPT | Performed by: PHYSICAL MEDICINE & REHABILITATION

## 2023-12-30 PROCEDURE — 97110 THERAPEUTIC EXERCISES: CPT

## 2023-12-30 PROCEDURE — 6370000000 HC RX 637 (ALT 250 FOR IP): Performed by: NURSE PRACTITIONER

## 2023-12-30 PROCEDURE — 97530 THERAPEUTIC ACTIVITIES: CPT

## 2023-12-30 PROCEDURE — 99232 SBSQ HOSP IP/OBS MODERATE 35: CPT | Performed by: INTERNAL MEDICINE

## 2023-12-30 PROCEDURE — 97535 SELF CARE MNGMENT TRAINING: CPT

## 2023-12-30 PROCEDURE — 97130 THER IVNTJ EA ADDL 15 MIN: CPT

## 2023-12-30 PROCEDURE — 97112 NEUROMUSCULAR REEDUCATION: CPT

## 2023-12-30 PROCEDURE — 1180000000 HC REHAB R&B

## 2023-12-30 PROCEDURE — 80048 BASIC METABOLIC PNL TOTAL CA: CPT

## 2023-12-30 RX ADMIN — DICLOFENAC SODIUM 2 G: 10 GEL TOPICAL at 21:31

## 2023-12-30 RX ADMIN — DOCUSATE SODIUM 100 MG: 100 CAPSULE, LIQUID FILLED ORAL at 08:48

## 2023-12-30 RX ADMIN — AMLODIPINE BESYLATE 5 MG: 5 TABLET ORAL at 08:48

## 2023-12-30 RX ADMIN — CLOPIDOGREL BISULFATE 75 MG: 75 TABLET ORAL at 08:48

## 2023-12-30 RX ADMIN — DOCUSATE SODIUM 100 MG: 100 CAPSULE, LIQUID FILLED ORAL at 21:30

## 2023-12-30 RX ADMIN — ATORVASTATIN CALCIUM 10 MG: 10 TABLET, FILM COATED ORAL at 21:31

## 2023-12-30 RX ADMIN — CYCLOBENZAPRINE 10 MG: 10 TABLET, FILM COATED ORAL at 08:47

## 2023-12-30 RX ADMIN — TRAZODONE HYDROCHLORIDE 50 MG: 50 TABLET ORAL at 21:31

## 2023-12-30 RX ADMIN — SEVELAMER CARBONATE 800 MG: 800 TABLET, FILM COATED ORAL at 16:35

## 2023-12-30 RX ADMIN — TRAMADOL HYDROCHLORIDE 50 MG: 50 TABLET, COATED ORAL at 21:34

## 2023-12-30 RX ADMIN — SEVELAMER CARBONATE 800 MG: 800 TABLET, FILM COATED ORAL at 08:47

## 2023-12-30 RX ADMIN — CETIRIZINE HYDROCHLORIDE 5 MG: 5 TABLET ORAL at 08:48

## 2023-12-30 RX ADMIN — MONTELUKAST SODIUM 10 MG: 10 TABLET ORAL at 08:47

## 2023-12-30 RX ADMIN — FAMOTIDINE 10 MG: 20 TABLET ORAL at 08:47

## 2023-12-30 RX ADMIN — SEVELAMER CARBONATE 800 MG: 800 TABLET, FILM COATED ORAL at 12:32

## 2023-12-30 RX ADMIN — FLUTICASONE PROPIONATE 2 SPRAY: 50 SPRAY, METERED NASAL at 08:49

## 2023-12-30 RX ADMIN — FOLIC ACID 500 MCG: 1 TABLET ORAL at 08:47

## 2023-12-30 RX ADMIN — Medication 6 MG: at 21:30

## 2023-12-30 RX ADMIN — CALCITRIOL CAPSULES 0.25 MCG 0.25 MCG: 0.25 CAPSULE ORAL at 08:48

## 2023-12-30 ASSESSMENT — PAIN DESCRIPTION - FREQUENCY: FREQUENCY: CONTINUOUS

## 2023-12-30 ASSESSMENT — PAIN DESCRIPTION - LOCATION: LOCATION: BACK

## 2023-12-30 ASSESSMENT — PAIN DESCRIPTION - ORIENTATION: ORIENTATION: LOWER

## 2023-12-30 ASSESSMENT — PAIN DESCRIPTION - DESCRIPTORS: DESCRIPTORS: ACHING;DISCOMFORT

## 2023-12-30 ASSESSMENT — PAIN - FUNCTIONAL ASSESSMENT: PAIN_FUNCTIONAL_ASSESSMENT: ACTIVITIES ARE NOT PREVENTED

## 2023-12-30 ASSESSMENT — PAIN SCALES - GENERAL
PAINLEVEL_OUTOF10: 4
PAINLEVEL_OUTOF10: 0

## 2023-12-30 ASSESSMENT — PAIN DESCRIPTION - ONSET: ONSET: ON-GOING

## 2023-12-30 NOTE — PLAN OF CARE
Problem: Discharge Planning  Goal: Discharge to home or other facility with appropriate resources  12/30/2023 1328 by Lupe Thakur, RN  Outcome: Progressing  Flowsheets (Taken 12/30/2023 1328)  Discharge to home or other facility with appropriate resources:   Identify barriers to discharge with patient and caregiver   Arrange for needed discharge resources and transportation as appropriate   Identify discharge learning needs (meds, wound care, etc)   Refer to discharge planning if patient needs post-hospital services based on physician order or complex needs related to functional status, cognitive ability or social support system     Problem: Safety - Adult  Goal: Free from fall injury  12/30/2023 1328 by Lupe Thakur, RN  Outcome: Progressing  Flowsheets (Taken 12/30/2023 1328)  Free From Fall Injury: Instruct family/caregiver on patient safety     Problem: ABCDS Injury Assessment  Goal: Absence of physical injury  Outcome: Progressing  Flowsheets (Taken 12/30/2023 1328)  Absence of Physical Injury: Implement safety measures based on patient assessment     Problem: Pain  Goal: Verbalizes/displays adequate comfort level or baseline comfort level  Outcome: Progressing  Flowsheets (Taken 12/30/2023 1328)  Verbalizes/displays adequate comfort level or baseline comfort level:   Encourage patient to monitor pain and request assistance   Assess pain using appropriate pain scale   Administer analgesics based on type and severity of pain and evaluate response   Implement non-pharmacological measures as appropriate and evaluate response   Notify Licensed Independent Practitioner if interventions unsuccessful or patient reports new pain     Problem: Chronic Conditions and Co-morbidities  Goal: Patient's chronic conditions and co-morbidity symptoms are monitored and maintained or improved  Outcome: Progressing  Flowsheets (Taken 12/30/2023 1328)  Care Plan - Patient's Chronic Conditions and Co-Morbidity Symptoms are

## 2023-12-30 NOTE — PLAN OF CARE
Problem: Discharge Planning  Goal: Discharge to home or other facility with appropriate resources  12/30/2023 0151 by Karime Browning LPN  Outcome: Progressing  Discharge to home or other facility with appropriate resources:   Identify barriers to discharge with patient and caregiver   Identify discharge learning needs (meds, wound care, etc)   Refer to discharge planning if patient needs post-hospital services based on physician order or complex needs related to functional status, cognitive ability or social support system   Arrange for needed discharge resources and transportation as appropriate  Note: Patient continues to progress with therapy. Social service working on discharge needs. Patient plans to discharge 1/3/2024.       Problem: Safety - Adult  Goal: Free from fall injury  12/30/2023 0151 by Karime Browning LPN  Outcome: Progressing  Free From Fall Injury:   Instruct family/caregiver on patient safety   Based on caregiver fall risk screen, instruct family/caregiver to ask for assistance with transferring infant if caregiver noted to have fall risk factors  Note: No falls sustained at this time. Patient alert to call light and uses appropriately to alert staff of needs. Bed/chair alarm in use.       Problem: Skin/Tissue Integrity  Goal: Absence of new skin breakdown  Description: 1.  Monitor for areas of redness and/or skin breakdown  2.  Assess vascular access sites hourly  3.  Every 4-6 hours minimum:  Change oxygen saturation probe site  4.  Every 4-6 hours:  If on nasal continuous positive airway pressure, respiratory therapy assess nares and determine need for appliance change or resting period.  12/30/2023 0151 by Karime Browning LPN  Outcome: Progressing  Note: No new skin issues noted this shift.    Care plan reviewed with patient. Patient verbalizes understanding of the plan of care continues to contribute towards goal progression.

## 2023-12-30 NOTE — PROGRESS NOTES
[facility]  Occupational Therapy  Daily Note  Time:   Time In: 0700  Time Out: 0800  Timed Code Treatment Minutes: 60 Minutes  Minutes: 60          Date: 2023  Patient Name: Deloris Watts,   Gender: female      Room: [facility]02/002-A  MRN: [id number]  : 1955  (68 y.o.)  Referring Practitioner: ordering:  Holly Whitaker APRN - CNP, attending: Jax Whatley MD  Diagnosis: traumatic compression fracture of L2 lumbar vertebra, closed initial encounter  Additional Pertinent Hx: Deloris is a 68 year old female whom presented to [facility] ED on 12/15/23 s/p fall on 23.  It is noted that pt has demonstrated reduced balance.  CT imaging of brain completed with no abnormalities, CT of cervical spine indicated fusion at C5-7 with no new abnormalities, CT of lumbar spine shows new inferior endplate fx at L2 and multiple levels of foraminal stenosis.  CT of abdomen/pelvis noted suggestive hepatic laceration/contusion.  It is noted pt has mild TTP of abdomen.  Pt. Underwent an L2 kyphoplasty on 23.  Pt. transferred to the IPR unit on 23 for further medical care and referred to [facility] OT services.    Restrictions/Precautions:  Restrictions/Precautions: Fall Risk, General Precautions  Position Activity Restriction  Other position/activity restrictions: Pt. s/p kyphoplasty, dialysis MWF with port in R chest region     SUBJECTIVE: Patient sleeping in bed upon arrival; minimal verbal stimulation to alert; agreeable to therapy this date.  Patient pleasant and cooperative throughout.  Nursing ok'd shower, port covered and protected    PAIN: 0/10:      Vitals: Vitals not assessed per clinical judgement, see nursing flowsheet    COGNITION: WFL    ADL:   Grooming: Supervision.  Standing at sink to curl hair, make up  Bathing: Supervision.  With minimal assistance for shower head to avoid dialysis port  Upper Extremity Dressing:  Supervision.     Lower Extremity Dressing: Supervision.     Footwear Management: Supervision.     Toileting: Supervision.     Toilet Transfer: Supervision.    Shower Transfer: Supervision.    .    BALANCE:  Sitting Balance:  Modified Independent.    Standing Balance: Supervision. 4WW, no LOB    BED MOBILITY:  Supine to Sit: Modified Independent, X 1, with head of bed raised, with increased time for completion      TRANSFERS:  Sit to Stand:  Supervision, X 1.    Stand to Sit: Supervision, X 1, with increased time for completion.      FUNCTIONAL MOBILITY:  Assistive Device: 4 Wheeled Walker  Assist Level:  Supervision.   Distance: To and from bathroom  Steady pace, no LOB     ADDITIONAL ACTIVITIES:    AM-PAC Inpatient Daily Activity Raw Score: 24  AM-PAC Inpatient ADL T-Scale Score : 57.54  ADL Inpatient CMS 0-100% Score: 0    ASSESSMENT:     Activity Tolerance:  Patient tolerance of  treatment: Good treatment tolerance      Discharge Recommendations: Continue to assess pending progress  Equipment Recommendations: Other: Pt. has reacher, shower chair, elevated toilet, RW, rollator, w/c.  Plan: Times Per Week: 5x/wk for 60 minutes  Current Treatment Recommendations: Strengthening, Balance training, Functional mobility training, Endurance training, Return to work related activity, Self-Care / ADL, Safety education & training, Neuromuscular re-education, Cognitive reorientation, Equipment evaluation, education, & procurement, Patient/Caregiver education & training, Home management training, Coordination training, Gait training, Wheelchair mobility training    Education:  Learners: Patient  Role of OT, Plan of Care, ADL's, IADL's, Home Safety, Importance of Increasing Activity, and Assistive Device Safety    Goals  Short Term Goals  Time Frame for Short Term Goals: 1 week  Short Term Goal 1: Pt. to retrieve/transport at least 3 items SBA in prep for self care regimen.  Short Term Goal 2: Pt. to demo SBA during standing

## 2023-12-30 NOTE — PROGRESS NOTES
reflect data entered into the flowsheet prior to or after PT session was completed.      OBJECTIVE:  Bed Mobility:  Rolling to Right: Stand By Assistance   Supine to Sit: Stand By Assistance  Sit to Supine: Stand By Assistance     Transfers:  Sit to Stand:Supervision, with verbal cues  Stand to Sit:Supervision, with verbal cues  Car:Stand By Assistance, with verbal cues    Ambulation:  Stand By Assistance  Distance: 250' x 2, 100' x 1, 50' x 1, multiple shorter distances.   Surface: Level Tile  Device: 4 Wheeled Walker  Gait Deviations:  Slow Anna, Decreased Step Length Bilaterally, Decreased Gait Speed, Decreased Heel Strike Bilaterally, and Wide Base of Support    Stairs:  None    Balance:  Pt. Completed standing dynamic balance activities with Normal DENNIS on level tile and on dynadisk and Intermittent UE support on 4 Wheeled Walker with Stand By Assistance, Contact Guard Assistance. Activity completed to improve balance, enhance functional mobility, and reduce risk of falls.     Neuromuscular Re-education  Pt. Completed standing, Stepping, and dynamic gait activities using Bilateral UE support on 4 Wheeled Walker/single UE support on booyah stick to improve coordination, gait mechanics, and lateral weight shifting for improved functional mobility.     Exercise:  Patient was guided in 1 set(s) 10 reps of exercises: Glut sets, Seated marches, Seated hamstring curls, Seated heel/toe raises, Long arc quads, Seated isometric hip adduction, Seated abduction/adduction, and abdominal isometrics.  Exercises were completed for increased independence with functional mobility.    Functional Outcome Measures:   Not completed  Modified Carolyn Scale:  Not Applicable     ASSESSMENT:  Assessment: Patient progressing toward established goals.  Activity Tolerance:  Patient tolerance of  treatment: good.     Equipment Recommendations:Equipment Needed: No (has RW, rollator, wheelchair and transport chair)  Discharge  Recommendations: Continue to assess pending progress, Patient would benefit from continued therapy after discharge   PLAN: Current Treatment Recommendations: Strengthening, Balance training, Functional mobility training, Transfer training, Endurance training, Wheelchair mobility training, Gait training, Stair training, Neuromuscular re-education, Home exercise program, Safety education & training, Patient/Caregiver education & training, Therapeutic activities  General Plan:  (5x/wk 90min)    Patient Education  Patient Education: Plan of Care, Functional Mobility, Reviewed Prior Education, Health Promotion and Wellness Education, Safety, Verbal Exercise Instruction    Goals:  Patient Goals : \"Be able to walk like I was with my walker\"  Short Term Goals  Time Frame for Short Term Goals: 1 week  Short Term Goal 1: Patient will complete supine < > sit with head of bed flat and no rails with supervision to transfer in and out of bed with decreased difficulty.  Short Term Goal 2: Patient will complete sit < > stand with SBA and less than or equal to 25% verbal cues for hand placement to stand to ambulate with increased safety.  Short Term Goal 3: Patient will ambulate 50' with a RW and SBA to progress towards navigating home safely. GOAL MET, SEE LTG  Short Term Goal 4: Patient will ascend/descend 2 steps with 1 handrail and CGA to access leave home.  Short Term Goal 5: Patient will improve tinetti to greater than or equal to 19/28 to reduce her risk for falls.  Long Term Goals  Time Frame for Long Term Goals : 3 weeks  Long Term Goal 1: Patient will complete supine < > sit with modified independence to transfer in and out of bed safely.  Long Term Goal 2: Patient will complete sit < > stand with modified independence to stand to ambulate safely.  Long Term Goal 3: Patient will complete bed < > chair transfer with modified independence with a RW to transfer surface to surface safely.  Long Term Goal 4: Patient will

## 2023-12-30 NOTE — PROGRESS NOTES
Patient: Deloris Watts  Unit/Bed: 8K-02/002-A  YOB: 1955  MRN: [id number] Acct: [id number]   Admitting Diagnosis: Traumatic compression fracture of L2 lumbar vertebra, closed, initial encounter ([facility]) [S32.020A]  Admit Date:  12/21/2023  Hospital Day: 8    Assessment:     Principal Problem:    Traumatic compression fracture of L2 lumbar vertebra, closed, initial encounter ([facility])  Active Problems:    Moderate malnutrition ([facility])    History of CVA (cerebrovascular accident)    Allergic rhinitis    Essential hypertension    Age related osteoporosis    ESRD (end stage renal disease) ([facility])    Hyperkalemia    Anemia, chronic disease    Secondary hyperparathyroidism of renal origin ([facility])    Liver injury, laceration    Closed compression fracture of L2 lumbar vertebra, initial encounter ([facility])  Resolved Problems:    * No resolved hospital problems. *      Plan:     Follow the elevated BP for now        Subjective:     Patient has no complaint of CP or SOB..   Medication side effects: none    Scheduled Meds:   diclofenac sodium  2 g Topical 4x Daily    traZODone  50 mg Oral Nightly    sevelamer  800 mg Oral TID WC    senna  2 tablet Oral Nightly    polyethylene glycol  17 g Oral BID    montelukast  10 mg Oral Daily    folic acid  500 mcg Oral Daily    fluticasone  2 spray Each Nostril Daily    famotidine  10 mg Oral Daily    docusate sodium  1 enema Rectal Daily    docusate sodium  100 mg Oral BID    cetirizine  5 mg Oral Daily    calcitRIOL  0.25 mcg Oral Daily    atorvastatin  10 mg Oral Nightly    amLODIPine  5 mg Oral Daily    linaclotide  290 mcg Oral QAM AC    melatonin  6 mg Oral Nightly    clopidogrel  75 mg Oral Daily     Continuous Infusions:   sodium chloride       PRN Meds:traMADol, sodium chloride flush, phenol, ondansetron, cyclobenzaprine, sodium chloride, acetaminophen, traZODone, bisacodyl, diclofenac sodium    Review of Systems  Pertinent items are noted in HPI.    Objective:     Patient

## 2023-12-30 NOTE — PROGRESS NOTES
Physical Medicine & Rehabilitation Progress Note    Chief Complaint:  Low back pain, generalized weakness after a fall    Subjective:    Deloris Watts is a 68 y.o. right-handed  female with history of hypertension, hyperlipidemia, hydronephrosis, fibromyalgia, irritable bowel syndrome, osteoporosis, end-stage renal disease on hemodialysis since January 2023, stroke with left side weakness in 1990s, anemia, depression, anxiety, right wrist and right ankle fracture treated conservatively, right proximal humeral fracture due to fall requiring ORIF, status post right carpal tunnel release surgery, status post cervical spine surgery, status post hysterectomy and bilateral oophorectomy, hemorrhoidectomy, sinus surgery, tubal ligation, LASEK surgery, was admitted on 12/21/2023 for intensive inpatient management of impairment & disability secondary to fall accident resulting L2 compression fracture requiring kyphoplasty, and liver laceration.       The patient says she was trying to put on her glasses standing in front on her dresser in the bedroom when suddenly she lost balance and fell backward to the floor at night of 12/14/2023.  She immediately feels severe pain on her lower back.  She did not lose consciousness.  She managed to crawl back to her bed at the time.  Her  called 911 and EMS brought the patient to [facility] ER in early morning of 12/15/2023.  She complains of low back pain and some pain at her head in ER.  Her blood pressure was 175/94 in ER.  Her BUN/creatinine was 51/6.1.  At home she take aspirin and Plavix.  CT of the head, cervical spine, abdomen and pelvis, and lumbar spine were done on 12/15/2023.  She was found to have new L2 inferior endplate fracture, and hepatic laceration/contusion.  Nephrology was consulted to continue her hemodialysis.  MRI of thoracic and lumbar spine were ordered and performed on 12/15/2023.  Lumbar spine MRI done on 12/15/2023 confirmed  safety with lift chair, keep commonly used objects within reach, plan activities out and take rest breaks, keeping home free of clutter and throw rugs.Pt. Demo good understanding throughout educational portion of OT session.    OT provided pt with various HEPs with education how to complete each HEP in prep for d/c, pt verbalized understanding during educational portion of OT session.   OTR scattered sticky notes in various planes within the hallway with pt instructed to locate and reach for items, pt demo Supervision during task with no LOB.  Task performed to improve activity tolerance, ability to cross midline and performance with daily tasks.       ST: Reviewed.  Generate/Recall - To-Do List  (Get dressed, do nails, use restroom, go for walk, go to gift shop)  Immediate recall: 3/5 independent; 2/5 with written visual  Delayed (~24 hour) recall: 5/5 independent with independent use of written aid.      *patient utilized write it down and repeat it    Functional Recall - Dr. Escobedo  Tuesday, 1/23/24 @ 10:30; pre-admission test; LUE graft; Dr. Doroteo Rich  Monday, 5/13/24 @ 8:30; 6 month follow-up; urology; Dr. Joya Rosario     Immediate recall: 7/12 independent; 2/12 categorical cue; 3/12 written visual  Delayed (~20 min) recall: 12/12 independent with written visual     *patient with independent utilization of write it down     Deductive reasoning tasks:     (Deloris's schedule): 8/8 indep  *Excellent comprehension, deductive thinking/reasoning and organization.  *Timely completion      ([place]): 6/12 indep, 6/12 with mod cues  *Mod cues to attend to pertinent details to aid in placement of information.   *Decreased comprehension/awareness of directional cues.      Sequencing ADL/IADLs  6 steps:  - Grilling hamburgers: 6/6 independent  - Washing windows: 4/6 independent; 2/6 min cues *good ID and correction of errors given cue to check work  - Laundry: 6/6 independent  - Taking out trash: 4/6 independent;

## 2023-12-30 NOTE — PROGRESS NOTES
[facility]   Nephrology progress note  12/30/2023, 1:39 PM      Pt Name:    Deloris Watts  MRN:     [id number]     YOB: 1955  Admit Date:    12/21/2023  1:13 PM    Chief Complaint: Nephrology following for ESRD and hemodialysis    Subjective:  Patient was seen and examined this morning  Feels well no complaints.    Objective:  24HR INTAKE/OUTPUT:    Intake/Output Summary (Last 24 hours) at 12/30/2023 1339  Last data filed at 12/30/2023 1309  Gross per 24 hour   Intake 1322 ml   Output 1400 ml   Net -78 ml      Admission weight: 47 kg (103 lb 9.9 oz)  Wt Readings from Last 3 Encounters:   12/30/23 41 kg (90 lb 6.2 oz)   12/20/23 47 kg (103 lb 9.9 oz)   12/15/23 45.4 kg (100 lb)        Vitals :   Vitals:    12/29/23 2123 12/30/23 0453 12/30/23 0836 12/30/23 1008   BP: (!) 149/67  (!) 171/83 127/66   Pulse: 89  66    Resp: 18  18    Temp: 98.4 °F (36.9 °C)  97.6 °F (36.4 °C)    TempSrc: Oral  Axillary    SpO2: 100%  100%    Weight:  41 kg (90 lb 6.2 oz)     Height:           Physical examination  General Appearance: Awake and alert no no distress  Mouth/Throat: Oral mucosa moist  Neck: Accessory muscle  Lungs: Clear  Heart:  S1, S2 heard  GI: soft, non-tender, no guarding  Extremities no edema    Medications:  Infusion:    sodium chloride       Meds:    diclofenac sodium  2 g Topical 4x Daily    traZODone  50 mg Oral Nightly    sevelamer  800 mg Oral TID WC    senna  2 tablet Oral Nightly    polyethylene glycol  17 g Oral BID    montelukast  10 mg Oral Daily    folic acid  500 mcg Oral Daily    fluticasone  2 spray Each Nostril Daily    famotidine  10 mg Oral Daily    docusate sodium  1 enema Rectal Daily    docusate sodium  100 mg Oral BID    cetirizine  5 mg Oral Daily    calcitRIOL  0.25 mcg Oral Daily    atorvastatin  10 mg Oral Nightly    amLODIPine  5 mg Oral Daily    linaclotide  290 mcg Oral QAM AC    melatonin  6 mg Oral Nightly    clopidogrel  75 mg Oral Daily     Lab Data

## 2023-12-30 NOTE — PROGRESS NOTES
[facility]  INPATIENT SPEECH THERAPY  [facility]  PROGRESS NOTE    TIME   SLP Individual Minutes  Time In: 0800  Time Out: 0830  Minutes: 30  Timed Code Treatment Minutes: 30 Minutes       Date: 2023  Patient Name: Deloris Watts      CSN: [id number]   : 1955  (68 y.o.)  Gender: female   Referring Physician:  Holly Whitaker APRN - CNP  Diagnosis: Traumatic compression fracture of L2 lumbar vertebra, closed, initial encounter  Precautions: fall risk  Current Diet: Regular Diet with Thin Liquids  Respiratory Status: Room Air  Swallowing Strategies: Standard Universal Swallow Precautions  Date of Last MBS/FEES: Not Applicable    Pain:  No pain reported.    Subjective: Patient sitting in recliner upon ST arrival. Agreeable to skilled ST services. No family present. Pleasant and cooperative throughout.     Short-Term Goals:  SHORT TERM GOAL #1:  Goal 1: Patient will complete immediate/delayed recall tasks with 70% accuracy given min cues to improve retention of novel and newly presented information. GOAL MET; REVISE  REVISED: Goal 1: Patient will complete immediate/delayed recall tasks with 80% accuracy at YOSHI level, with or without use of compensatory strategies, to improve retention of novel and newly presented information.  INTERVENTIONS:    Functional Recall - Dr. Escobedo  Tuesday, 24 @ 10:30; pre-admission test; LUE graft; Dr. Doroteo Rich  Monday, 24 @ 8:30; 6 month follow-up; urology; Dr. Joya Rosario    Immediate recall:  independent; 2 categorical cue; 3/12 written visual  Delayed (~20 min) recall:  independent with written visual    *patient with independent utilization of write it down    SHORT TERM GOAL #2:  Goal 2: Patient will complete problem solving, visual/verbal reasoning, executive functioning tasks with 70% given min cues to improve return to IADLs/ADLs. GOAL MET; REVISE  REVISED: Goal 2: Patient will complete

## 2023-12-30 NOTE — PROGRESS NOTES
Physical Medicine & Rehabilitation Progress Note    Chief Complaint:  Low back pain, generalized weakness after a fall    Subjective:    Deloris Watts is a 68 y.o. right-handed  female with history of hypertension, hyperlipidemia, hydronephrosis, fibromyalgia, irritable bowel syndrome, osteoporosis, end-stage renal disease on hemodialysis since January 2023, stroke with left side weakness in 1990s, anemia, depression, anxiety, right wrist and right ankle fracture treated conservatively, right proximal humeral fracture due to fall requiring ORIF, status post right carpal tunnel release surgery, status post cervical spine surgery, status post hysterectomy and bilateral oophorectomy, hemorrhoidectomy, sinus surgery, tubal ligation, LASEK surgery, was admitted on 12/21/2023 for intensive inpatient management of impairment & disability secondary to fall accident resulting L2 compression fracture requiring kyphoplasty, and liver laceration.       The patient says she was trying to put on her glasses standing in front on her dresser in the bedroom when suddenly she lost balance and fell backward to the floor at night of 12/14/2023.  She immediately feels severe pain on her lower back.  She did not lose consciousness.  She managed to crawl back to her bed at the time.  Her  called 911 and EMS brought the patient to [facility] ER in early morning of 12/15/2023.  She complains of low back pain and some pain at her head in ER.  Her blood pressure was 175/94 in ER.  Her BUN/creatinine was 51/6.1.  At home she take aspirin and Plavix.  CT of the head, cervical spine, abdomen and pelvis, and lumbar spine were done on 12/15/2023.  She was found to have new L2 inferior endplate fracture, and hepatic laceration/contusion.  Nephrology was consulted to continue her hemodialysis.  MRI of thoracic and lumbar spine were ordered and performed on 12/15/2023.  Lumbar spine MRI done on 12/15/2023 confirmed  hip and lower back painful area for pain control  Continue Norvasc for hypertension  Continue Lipitor for hyperlipidemia  Continue calcitriol, sevelamer for hypocalcemia and hyperphosphatemia due to renal failure  Continue Pepcid for gastric protection  Continue Linzess for irritable bowel syndrome  Continue Singulair, Flonase for allergic rhinitis  Continue folic acid supplement  Continue Plavix for secondary stroke prevention  Continue melatonin, trazodone as needed for insomnia  Continues hemodialysis (Monday, Wednesday and Friday) under the direction of nephrology service  Nutrition: Continue regular diet  Bladder: Monitoring signs or symptoms of UTI  Bowel: Monitoring signs or symptoms of constipation   and case management for coordination of care and discharge planning      Missed Therapy Time:  None      ANTON DAVIS MD

## 2023-12-31 LAB
ANION GAP SERPL CALC-SCNC: 13 MEQ/L (ref 8–16)
BUN SERPL-MCNC: 65 MG/DL (ref 7–22)
CALCIUM SERPL-MCNC: 9.8 MG/DL (ref 8.5–10.5)
CHLORIDE SERPL-SCNC: 104 MEQ/L (ref 98–111)
CO2 SERPL-SCNC: 23 MEQ/L (ref 23–33)
CREAT SERPL-MCNC: 6.4 MG/DL (ref 0.4–1.2)
GFR SERPL CREATININE-BSD FRML MDRD: 7 ML/MIN/1.73M2
GLUCOSE SERPL-MCNC: 104 MG/DL (ref 70–108)
POTASSIUM SERPL-SCNC: 4.7 MEQ/L (ref 3.5–5.2)
SODIUM SERPL-SCNC: 140 MEQ/L (ref 135–145)

## 2023-12-31 PROCEDURE — 99231 SBSQ HOSP IP/OBS SF/LOW 25: CPT | Performed by: INTERNAL MEDICINE

## 2023-12-31 PROCEDURE — 1180000000 HC REHAB R&B

## 2023-12-31 PROCEDURE — 6370000000 HC RX 637 (ALT 250 FOR IP): Performed by: PHYSICAL MEDICINE & REHABILITATION

## 2023-12-31 PROCEDURE — 97116 GAIT TRAINING THERAPY: CPT

## 2023-12-31 PROCEDURE — 6370000000 HC RX 637 (ALT 250 FOR IP): Performed by: NURSE PRACTITIONER

## 2023-12-31 PROCEDURE — 97530 THERAPEUTIC ACTIVITIES: CPT

## 2023-12-31 PROCEDURE — 80048 BASIC METABOLIC PNL TOTAL CA: CPT

## 2023-12-31 PROCEDURE — 36415 COLL VENOUS BLD VENIPUNCTURE: CPT

## 2023-12-31 PROCEDURE — 99232 SBSQ HOSP IP/OBS MODERATE 35: CPT | Performed by: PHYSICAL MEDICINE & REHABILITATION

## 2023-12-31 PROCEDURE — 97535 SELF CARE MNGMENT TRAINING: CPT

## 2023-12-31 RX ADMIN — SEVELAMER CARBONATE 800 MG: 800 TABLET, FILM COATED ORAL at 18:26

## 2023-12-31 RX ADMIN — CALCITRIOL CAPSULES 0.25 MCG 0.25 MCG: 0.25 CAPSULE ORAL at 10:16

## 2023-12-31 RX ADMIN — FOLIC ACID 500 MCG: 1 TABLET ORAL at 10:16

## 2023-12-31 RX ADMIN — Medication 6 MG: at 20:00

## 2023-12-31 RX ADMIN — ATORVASTATIN CALCIUM 10 MG: 10 TABLET, FILM COATED ORAL at 20:00

## 2023-12-31 RX ADMIN — AMLODIPINE BESYLATE 5 MG: 5 TABLET ORAL at 10:16

## 2023-12-31 RX ADMIN — DOCUSATE SODIUM 100 MG: 100 CAPSULE, LIQUID FILLED ORAL at 20:00

## 2023-12-31 RX ADMIN — MONTELUKAST SODIUM 10 MG: 10 TABLET ORAL at 10:16

## 2023-12-31 RX ADMIN — CETIRIZINE HYDROCHLORIDE 5 MG: 5 TABLET ORAL at 10:16

## 2023-12-31 RX ADMIN — TRAZODONE HYDROCHLORIDE 50 MG: 50 TABLET ORAL at 20:00

## 2023-12-31 RX ADMIN — SEVELAMER CARBONATE 800 MG: 800 TABLET, FILM COATED ORAL at 10:15

## 2023-12-31 RX ADMIN — CLOPIDOGREL BISULFATE 75 MG: 75 TABLET ORAL at 10:16

## 2023-12-31 RX ADMIN — SENNOSIDES 17.2 MG: 8.6 TABLET, FILM COATED ORAL at 20:00

## 2023-12-31 RX ADMIN — SEVELAMER CARBONATE 800 MG: 800 TABLET, FILM COATED ORAL at 13:29

## 2023-12-31 RX ADMIN — DOCUSATE SODIUM 100 MG: 100 CAPSULE, LIQUID FILLED ORAL at 10:16

## 2023-12-31 RX ADMIN — FAMOTIDINE 10 MG: 20 TABLET ORAL at 10:16

## 2023-12-31 NOTE — PROGRESS NOTES
[facility]  INPATIENT PHYSICAL THERAPY  [facility] - 8K-02/002-A  Progress Note    Time In: 1140  Time Out: 1218  Timed Code Treatment Minutes: 38 Minutes  Minutes: 38          Date: 2023  Patient Name: Deloris Watts,  Gender:  female        MRN: [id number]  : 1955  (68 y.o.)  Referral Date : 23  Referring Practitioner: Holly Whitaker APRN - CNP  Diagnosis: Traumatic compression fracture of L2 lumbar vertebra, closed, initial encounte  Treatment Diagnosis: difficulty in walking  Additional Pertinent Hx: Per H&P:Deloris Watts is a 68 y.o. right-handed  female with history of hypertension, hyperlipidemia, hydronephrosis, fibromyalgia, irritable bowel syndrome, osteoporosis, end-stage renal disease on hemodialysis since 2023, stroke with left side weakness in , anemia, depression, anxiety, right wrist and right ankle fracture treated conservatively, right proximal humeral fracture due to fall requiring ORIF, status post right carpal tunnel release surgery, status post cervical spine surgery, status post hysterectomy and bilateral oophorectomy, hemorrhoidectomy, sinus surgery, tubal ligation, LASEK surgery, is admitted to the inpatient rehabilitation unit on 2023 for intensive inpatient rehabilitation treatment of impairment and disability secondary to fall accident resulting L2 compression fracture requiring kyphoplasty, and liver laceration.She was found to have new L2 inferior endplate fracture, and hepatic laceration/contusion.  Nephrology was consulted to continue her hemodialysis.  MRI of thoracic and lumbar spine were ordered and performed on 12/15/2023.  Lumbar spine MRI done on 12/15/2023 confirmed the presence of acute L2 20% inferior endplate compression fracture without displacement of the fragment.  Thoracic spine MRI revealed no fracture or disc protrusion, and normal spinal cord. Neurosurgery was consulted  Supervision     Transfers:  Sit to Stand:Supervision, cues for hand placement  Stand to Sit:Supervision, cues for hand placement    Ambulation:  Stand By Assistance  Distance: 200 ft. X2; 30 ft. x1  Surface: Level Tile  Device: 4 Wheeled Walker  Gait Deviations:  Forward Flexed Posture, Slow Anna, Decreased Step Length Bilaterally, Decreased Gait Speed, Decreased Heel Strike Bilaterally, and Mild Path Deviations    Stairs:  Stairs: 6\" steps X 4 using both hands on left ascending rail  and Stand By Assistance, Contact Guard Assistance.    Balance:  Static Standing Balance: Stand By Assistance  Dynamic Standing Balance: Contact Guard Assistance    Neuromuscular Re-education  None    Exercise:  None    Functional Outcome Measures:   Completed.    Tinetti Balance    Sitting Balance: Steady, safe  Arises: Able without using arms  Attempts to Arise: Able to arise, one attempt  Immediate Standing Balance (First 5 Seconds): Steady but uses walker or other support  Standing Balance: Narrow stance without support  Nudged: Staggers, grabs, catches self  Eyes Closed: Steady  Turned 360 Degrees: Steadiness: Steady  Turned 360 Degrees: Continuity of Steps: Discontinuous steps  Sitting Down: Safe, smooth motion  Balance Score: 13/16 Initiation of Gait: No hesitancy  Step Height: R Swing Foot: Right foot complete clears floor  Step Length: R Swing Foot: Passes left stance foot  Step Height: L Swing Foot: Left foot complete clears floor  Step Length: L Swing Foot: Does not pass right stance foot with step  Step Symmetry: Right and left step length not equal (estimate)  Step Continuity: Steps appear continuous  Path: Mild/moderate deviation or uses walking aid  Trunk: Marked sway or uses walking aid  Walking Time: Heels apart  Gait Score: 6/12     Current Score: 19 / 28 (Date: 12/31/2023)    Interpretation of Score: Tinetti is  into a gait score and balance score. The higher the patient's score, the more independent/lower

## 2023-12-31 NOTE — PLAN OF CARE
Problem: Discharge Planning  Goal: Discharge to home or other facility with appropriate resources  12/31/2023 1159 by Simona Bah RN  Outcome: Progressing  Flowsheets (Taken 12/31/2023 1159)  Discharge to home or other facility with appropriate resources:   Identify barriers to discharge with patient and caregiver   Identify discharge learning needs (meds, wound care, etc)   Refer to discharge planning if patient needs post-hospital services based on physician order or complex needs related to functional status, cognitive ability or social support system   Arrange for needed discharge resources and transportation as appropriate  Note: Patient being discharged to home 1/5 with outpatient therapies.  12/30/2023 2356 by Jasmine Ware RN  Outcome: Progressing  Flowsheets (Taken 12/30/2023 2127)  Discharge to home or other facility with appropriate resources: Identify barriers to discharge with patient and caregiver     Problem: Safety - Adult  Goal: Free from fall injury  12/31/2023 1159 by Simona Bah RN  Outcome: Progressing  Flowsheets (Taken 12/31/2023 1159)  Free From Fall Injury:   Instruct family/caregiver on patient safety   Based on caregiver fall risk screen, instruct family/caregiver to ask for assistance with transferring infant if caregiver noted to have fall risk factors  Note: Patient remains free from falls at this time.  12/30/2023 2356 by Jasmine Ware RN  Outcome: Progressing     Problem: ABCDS Injury Assessment  Goal: Absence of physical injury  12/31/2023 1159 by Simona Bah RN  Outcome: Progressing  Flowsheets (Taken 12/31/2023 1159)  Absence of Physical Injury: Implement safety measures based on patient assessment  Note: Patient continues to work with PT and OT  12/30/2023 2356 by Jasmine Ware RN  Outcome: Progressing     Problem: Pain  Goal: Verbalizes/displays adequate comfort level or baseline comfort level  12/31/2023 1159 by Simona Bah RN  Outcome:

## 2023-12-31 NOTE — PROGRESS NOTES
[facility]  Occupational Therapy  Progress Note  Time:   Time In: 930  Time Out: 1000  Timed Code Treatment Minutes: 30 Minutes  Minutes: 30          Date: 2023  Patient Name: Deloris Watts,   Gender: female      Room: [facility]02/002-A  MRN: [id number]  : 1955  (68 y.o.)  Referring Practitioner: ordering:  Holly Whitaker APRN - CNP, attending: Jax Whatley MD  Diagnosis: traumatic compression fracture of L2 lumbar vertebra, closed initial encounter  Additional Pertinent Hx: Deloris is a 68 year old female whom presented to [facility] ED on 12/15/23 s/p fall on 23.  It is noted that pt has demonstrated reduced balance.  CT imaging of brain completed with no abnormalities, CT of cervical spine indicated fusion at C5-7 with no new abnormalities, CT of lumbar spine shows new inferior endplate fx at L2 and multiple levels of foraminal stenosis.  CT of abdomen/pelvis noted suggestive hepatic laceration/contusion.  It is noted pt has mild TTP of abdomen.  Pt. Underwent an L2 kyphoplasty on 23.  Pt. transferred to the IPR unit on 23 for further medical care and referred to [facility] OT services.    Restrictions/Precautions:  Restrictions/Precautions: Fall Risk, General Precautions  Position Activity Restriction  Other position/activity restrictions: Pt. s/p kyphoplasty, dialysis MWF with port in R chest region     SUBJECTIVE: Patient supine in bed upon arrival; agreeable to therapy this date.  Patient pleasant and cooperative throughout.    PAIN: 0/10:      Vitals: Vitals not assessed per clinical judgement, see nursing flowsheet    COGNITION: WFL    ADL:   Grooming: Supervision.  Seated in w/c at sink  Bathing: Supervision.  Seated in w/c at sink with patient able to safely stand for pericare  Upper Extremity Dressing: Supervision.     Lower Extremity Dressing: Supervision.     Footwear Management: Supervision.     .    BALANCE:  Sitting Balance:  Modified Independent.    Standing Balance: Supervision.      BED MOBILITY:  Supine to Sit: Supervision, X 1, with head of bed raised, with rail, with increased time for completion      TRANSFERS:  Sit to Stand:  Supervision.    Stand to Sit: Supervision.      FUNCTIONAL MOBILITY:  Assistive Device: 4 Wheeled Walker  Assist Level:  Supervision.   Distance: To and from bathroom  Stead pace, no LOB     ADDITIONAL ACTIVITIES:  Patient completed item retrieval activity in order to gather clothing items for ADL activity in order to address safety awareness, balance, increase activity tolerance, strength to increase independence with ADLs/IADls.  Patient completed activity with SUP with rollator with minimal verbal cues initially for safety to lock breaks of rollator with patient able to safely retrieve and place clothing items at various levels.  Patient able to tolerate ~5 min of activity with no rest break.    AM-[facility] Inpatient Daily Activity Raw Score: 24  AM-[facility] Inpatient ADL T-Scale Score : 57.54  ADL Inpatient CMS 0-100% Score: 0    ASSESSMENT:   Pt is making good progress towards goals. Pt has met 2/3 STGs and 1/3 LTGs. Pt has exhibited improvement in endurance, balance, strength, bilateral release however patient continues to demonstrate decreased safety awareness with fall prevention strategies causing barriers to meeting pt's goals. Patient requires SUP with all ADL/IADL activies however continues to require skilled OT intervention to improve ADL/IADL performance by modifying and adapting tasks required for pt to return home at [facility].    Activity Tolerance:  Patient tolerance of  treatment: Good treatment tolerance      Discharge Recommendations: Continue to assess pending progress  Equipment Recommendations: Other: Pt. has reacher, shower chair, elevated toilet, RW, rollator, w/c.  Plan: Times Per Week: 5x/wk for 60 minutes  Current Treatment Recommendations: Strengthening,

## 2023-12-31 NOTE — PROGRESS NOTES
[facility]   Nephrology progress note  12/31/2023, 1:32 PM      Pt Name:    Deloris Watts  MRN:     [id number]     YOB: 1955  Admit Date:    12/21/2023  1:13 PM    Chief Complaint: Nephrology following for ESRD and hemodialysis    Subjective:  Patient was seen and examined this morning  Doing well.    Objective:  24HR INTAKE/OUTPUT:    Intake/Output Summary (Last 24 hours) at 12/31/2023 1332  Last data filed at 12/31/2023 1323  Gross per 24 hour   Intake 840 ml   Output --   Net 840 ml      Admission weight: 47 kg (103 lb 9.9 oz)  Wt Readings from Last 3 Encounters:   12/31/23 42.3 kg (93 lb 4.1 oz)   12/20/23 47 kg (103 lb 9.9 oz)   12/15/23 45.4 kg (100 lb)        Vitals :   Vitals:    12/30/23 2134 12/30/23 2204 12/31/23 0600 12/31/23 1011   BP: 127/68   (!) 150/79   Pulse: 68   87   Resp: 18 18  18   Temp: 97.9 °F (36.6 °C)   97.5 °F (36.4 °C)   TempSrc: Oral   Oral   SpO2: 98%   96%   Weight:   42.3 kg (93 lb 4.1 oz)    Height:           Physical examination  General Appearance: Awake and alert no no distress  Mouth/Throat: Oral mucosa moist  Neck: Accessory muscle  Lungs: Clear  Heart:  S1, S2 heard  GI: soft, non-tender, no guarding  Extremities no edema    Medications:  Infusion:    sodium chloride       Meds:    diclofenac sodium  2 g Topical 4x Daily    traZODone  50 mg Oral Nightly    sevelamer  800 mg Oral TID WC    senna  2 tablet Oral Nightly    polyethylene glycol  17 g Oral BID    montelukast  10 mg Oral Daily    folic acid  500 mcg Oral Daily    fluticasone  2 spray Each Nostril Daily    famotidine  10 mg Oral Daily    docusate sodium  1 enema Rectal Daily    docusate sodium  100 mg Oral BID    cetirizine  5 mg Oral Daily    calcitRIOL  0.25 mcg Oral Daily    atorvastatin  10 mg Oral Nightly    amLODIPine  5 mg Oral Daily    linaclotide  290 mcg Oral QAM AC    melatonin  6 mg Oral Nightly    clopidogrel  75 mg Oral Daily     Lab Data :  CBC:   No results for

## 2023-12-31 NOTE — PROGRESS NOTES
Patient: Deloris Watts  Unit/Bed: 8K-02/002-A  YOB: 1955  MRN: [id number] Acct: [id number]   Admitting Diagnosis: Traumatic compression fracture of L2 lumbar vertebra, closed, initial encounter ([facility]) [S32.020A]  Admit Date:  12/21/2023  Hospital Day: 10    Assessment:     Principal Problem:    Traumatic compression fracture of L2 lumbar vertebra, closed, initial encounter ([facility])  Active Problems:    Moderate malnutrition ([facility])    History of CVA (cerebrovascular accident)    Allergic rhinitis    Essential hypertension    Age related osteoporosis    ESRD (end stage renal disease) ([facility])    Hyperkalemia    Anemia, chronic disease    Secondary hyperparathyroidism of renal origin ([facility])    Liver injury, laceration    Closed compression fracture of L2 lumbar vertebra, initial encounter ([facility])  Resolved Problems:    * No resolved hospital problems. *      Plan:     Nephrology managing the CRF  Check CBC in the AM  Follow the variable BP for now        Subjective:     Patient has no complaint of CP or SOB..   Medication side effects: none    Scheduled Meds:   diclofenac sodium  2 g Topical 4x Daily    traZODone  50 mg Oral Nightly    sevelamer  800 mg Oral TID WC    senna  2 tablet Oral Nightly    polyethylene glycol  17 g Oral BID    montelukast  10 mg Oral Daily    folic acid  500 mcg Oral Daily    fluticasone  2 spray Each Nostril Daily    famotidine  10 mg Oral Daily    docusate sodium  1 enema Rectal Daily    docusate sodium  100 mg Oral BID    cetirizine  5 mg Oral Daily    calcitRIOL  0.25 mcg Oral Daily    atorvastatin  10 mg Oral Nightly    amLODIPine  5 mg Oral Daily    linaclotide  290 mcg Oral QAM AC    melatonin  6 mg Oral Nightly    clopidogrel  75 mg Oral Daily     Continuous Infusions:   sodium chloride       PRN Meds:traMADol, sodium chloride flush, phenol, ondansetron, cyclobenzaprine, sodium chloride, acetaminophen, traZODone, bisacodyl, diclofenac sodium    Review of Systems  Pertinent  items are noted in HPI.    Objective:     Patient Vitals for the past 8 hrs:   BP Temp Temp src Pulse Resp SpO2   12/31/23 1011 (!) 150/79 97.5 °F (36.4 °C) Oral 87 18 96 %     I/O last 3 completed shifts:  In: 1017 [P.O.:1017]  Out: -   I/O this shift:  In: 420 [P.O.:420]  Out: -     BP (!) 150/79   Pulse 87   Temp 97.5 °F (36.4 °C) (Oral)   Resp 18   Ht 1.524 m (5')   Wt 42.3 kg (93 lb 4.1 oz)   SpO2 96%   BMI 18.21 kg/m²     General appearance: alert, appears stated age, and cooperative  Head: Normocephalic, without obvious abnormality, atraumatic  Lungs: clear to auscultation bilaterally  Heart: regular rate and rhythm, S1, S2 normal, no murmur, click, rub or gallop  Abdomen: soft, non-tender; bowel sounds normal; no masses,  no organomegaly  Extremities: extremities normal, atraumatic, no cyanosis or edema  Skin: Skin color, texture, turgor normal. No rashes or lesions  Neurologic:  weak    Electronically signed by Gareth Mosquera MD on 12/31/2023 at 5:42 PM

## 2023-12-31 NOTE — PLAN OF CARE
Problem: Discharge Planning  Goal: Discharge to home or other facility with appropriate resources  12/30/2023 2356 by Jasmine Ware, RN  Outcome: Progressing  Flowsheets (Taken 12/30/2023 2127)  Discharge to home or other facility with appropriate resources: Identify barriers to discharge with patient and caregiver     Problem: Safety - Adult  Goal: Free from fall injury  12/30/2023 2356 by Jasmine Ware, RN  Outcome: Progressing     Problem: ABCDS Injury Assessment  Goal: Absence of physical injury  12/30/2023 2356 by Jasmine Ware, RN  Outcome: Progressing     Problem: Pain  Goal: Verbalizes/displays adequate comfort level or baseline comfort level  12/30/2023 2356 by Jasmine Ware, RN  Outcome: Progressing  Flowsheets (Taken 12/30/2023 2134)  Verbalizes/displays adequate comfort level or baseline comfort level: Encourage patient to monitor pain and request assistance     Problem: Chronic Conditions and Co-morbidities  Goal: Patient's chronic conditions and co-morbidity symptoms are monitored and maintained or improved  12/30/2023 2356 by Jasmine Ware, RN  Outcome: Progressing  Flowsheets (Taken 12/30/2023 2127)  Care Plan - Patient's Chronic Conditions and Co-Morbidity Symptoms are Monitored and Maintained or Improved: Monitor and assess patient's chronic conditions and comorbid symptoms for stability, deterioration, or improvement

## 2023-12-31 NOTE — CARE COORDINATION
Patient denied any needs or concerns this shift. Patient will have dialysis tomorrow at 11 and early trays ordered.

## 2024-01-01 LAB
ANION GAP SERPL CALC-SCNC: 13 MEQ/L (ref 8–16)
BASOPHILS ABSOLUTE: 0.1 THOU/MM3 (ref 0–0.1)
BASOPHILS NFR BLD AUTO: 1.3 %
BUN SERPL-MCNC: 74 MG/DL (ref 7–22)
CALCIUM SERPL-MCNC: 9.6 MG/DL (ref 8.5–10.5)
CHLORIDE SERPL-SCNC: 104 MEQ/L (ref 98–111)
CO2 SERPL-SCNC: 23 MEQ/L (ref 23–33)
CREAT SERPL-MCNC: 6.7 MG/DL (ref 0.4–1.2)
DEPRECATED RDW RBC AUTO: 51.3 FL (ref 35–45)
EOSINOPHIL NFR BLD AUTO: 2.9 %
EOSINOPHILS ABSOLUTE: 0.2 THOU/MM3 (ref 0–0.4)
ERYTHROCYTE [DISTWIDTH] IN BLOOD BY AUTOMATED COUNT: 14.9 % (ref 11.5–14.5)
GFR SERPL CREATININE-BSD FRML MDRD: 6 ML/MIN/1.73M2
GLUCOSE SERPL-MCNC: 95 MG/DL (ref 70–108)
HCT VFR BLD AUTO: 33.7 % (ref 37–47)
HGB BLD-MCNC: 10.1 GM/DL (ref 12–16)
IMM GRANULOCYTES # BLD AUTO: 0.05 THOU/MM3 (ref 0–0.07)
IMM GRANULOCYTES NFR BLD AUTO: 0.7 %
LYMPHOCYTES ABSOLUTE: 2.3 THOU/MM3 (ref 1–4.8)
LYMPHOCYTES NFR BLD AUTO: 33.6 %
MCH RBC QN AUTO: 28.1 PG (ref 26–33)
MCHC RBC AUTO-ENTMCNC: 30 GM/DL (ref 32.2–35.5)
MCV RBC AUTO: 93.6 FL (ref 81–99)
MONOCYTES ABSOLUTE: 0.5 THOU/MM3 (ref 0.4–1.3)
MONOCYTES NFR BLD AUTO: 7.2 %
NEUTROPHILS NFR BLD AUTO: 54.3 %
NRBC BLD AUTO-RTO: 0 /100 WBC
PLATELET # BLD AUTO: 272 THOU/MM3 (ref 130–400)
PMV BLD AUTO: 9.6 FL (ref 9.4–12.4)
POTASSIUM SERPL-SCNC: 4.6 MEQ/L (ref 3.5–5.2)
RBC # BLD AUTO: 3.6 MILL/MM3 (ref 4.2–5.4)
SEGMENTED NEUTROPHILS ABSOLUTE COUNT: 3.7 THOU/MM3 (ref 1.8–7.7)
SODIUM SERPL-SCNC: 140 MEQ/L (ref 135–145)
WBC # BLD AUTO: 6.9 THOU/MM3 (ref 4.8–10.8)

## 2024-01-01 PROCEDURE — 80048 BASIC METABOLIC PNL TOTAL CA: CPT

## 2024-01-01 PROCEDURE — 1180000000 HC REHAB R&B

## 2024-01-01 PROCEDURE — 6370000000 HC RX 637 (ALT 250 FOR IP): Performed by: NURSE PRACTITIONER

## 2024-01-01 PROCEDURE — 99232 SBSQ HOSP IP/OBS MODERATE 35: CPT | Performed by: INTERNAL MEDICINE

## 2024-01-01 PROCEDURE — 85025 COMPLETE CBC W/AUTO DIFF WBC: CPT

## 2024-01-01 PROCEDURE — 6370000000 HC RX 637 (ALT 250 FOR IP): Performed by: PHYSICAL MEDICINE & REHABILITATION

## 2024-01-01 PROCEDURE — 90935 HEMODIALYSIS ONE EVALUATION: CPT

## 2024-01-01 PROCEDURE — 36415 COLL VENOUS BLD VENIPUNCTURE: CPT

## 2024-01-01 RX ADMIN — DICLOFENAC SODIUM 2 G: 10 GEL TOPICAL at 22:03

## 2024-01-01 RX ADMIN — FLUTICASONE PROPIONATE 2 SPRAY: 50 SPRAY, METERED NASAL at 09:00

## 2024-01-01 RX ADMIN — CETIRIZINE HYDROCHLORIDE 5 MG: 5 TABLET ORAL at 08:58

## 2024-01-01 RX ADMIN — CLOPIDOGREL BISULFATE 75 MG: 75 TABLET ORAL at 08:58

## 2024-01-01 RX ADMIN — MONTELUKAST SODIUM 10 MG: 10 TABLET ORAL at 08:59

## 2024-01-01 RX ADMIN — AMLODIPINE BESYLATE 5 MG: 5 TABLET ORAL at 08:59

## 2024-01-01 RX ADMIN — SEVELAMER CARBONATE 800 MG: 800 TABLET, FILM COATED ORAL at 18:21

## 2024-01-01 RX ADMIN — SEVELAMER CARBONATE 800 MG: 800 TABLET, FILM COATED ORAL at 08:58

## 2024-01-01 RX ADMIN — DOCUSATE SODIUM 100 MG: 100 CAPSULE, LIQUID FILLED ORAL at 08:58

## 2024-01-01 RX ADMIN — Medication 6 MG: at 22:05

## 2024-01-01 RX ADMIN — TRAMADOL HYDROCHLORIDE 50 MG: 50 TABLET, COATED ORAL at 22:06

## 2024-01-01 RX ADMIN — CALCITRIOL CAPSULES 0.25 MCG 0.25 MCG: 0.25 CAPSULE ORAL at 08:59

## 2024-01-01 RX ADMIN — DICLOFENAC SODIUM 2 G: 10 GEL TOPICAL at 15:27

## 2024-01-01 RX ADMIN — ATORVASTATIN CALCIUM 10 MG: 10 TABLET, FILM COATED ORAL at 22:03

## 2024-01-01 RX ADMIN — FOLIC ACID 500 MCG: 1 TABLET ORAL at 08:59

## 2024-01-01 RX ADMIN — ACETAMINOPHEN 650 MG: 325 TABLET ORAL at 14:36

## 2024-01-01 RX ADMIN — SEVELAMER CARBONATE 800 MG: 800 TABLET, FILM COATED ORAL at 11:37

## 2024-01-01 RX ADMIN — TRAZODONE HYDROCHLORIDE 50 MG: 50 TABLET ORAL at 22:04

## 2024-01-01 RX ADMIN — FAMOTIDINE 10 MG: 20 TABLET ORAL at 08:58

## 2024-01-01 ASSESSMENT — ENCOUNTER SYMPTOMS
TROUBLE SWALLOWING: 0
BACK PAIN: 0
CONSTIPATION: 0
RHINORRHEA: 0
VOMITING: 0
SORE THROAT: 0
SHORTNESS OF BREATH: 0
WHEEZING: 0
DIARRHEA: 0
NAUSEA: 0
COUGH: 0
ABDOMINAL PAIN: 0

## 2024-01-01 ASSESSMENT — PAIN DESCRIPTION - LOCATION
LOCATION: BACK
LOCATION: BACK

## 2024-01-01 ASSESSMENT — PAIN - FUNCTIONAL ASSESSMENT: PAIN_FUNCTIONAL_ASSESSMENT: ACTIVITIES ARE NOT PREVENTED

## 2024-01-01 ASSESSMENT — PAIN DESCRIPTION - ORIENTATION: ORIENTATION: MID

## 2024-01-01 NOTE — PLAN OF CARE
Problem: Discharge Planning  Goal: Discharge to home or other facility with appropriate resources  1/1/2024 1136 by Ashanti Booth RN  Outcome: Progressing  Flowsheets (Taken 1/1/2024 0930)  Discharge to home or other facility with appropriate resources: Identify barriers to discharge with patient and caregiver  12/31/2023 2248 by Virgilio Burns, RN  Outcome: Progressing  Flowsheets (Taken 12/31/2023 1945)  Discharge to home or other facility with appropriate resources: Identify barriers to discharge with patient and caregiver     Problem: Safety - Adult  Goal: Free from fall injury  1/1/2024 1136 by Ashanti Booth, RN  Outcome: Progressing  Flowsheets (Taken 1/1/2024 1126)  Free From Fall Injury: Instruct family/caregiver on patient safety  12/31/2023 2248 by Virgilio Burns, RN  Outcome: Progressing     Problem: ABCDS Injury Assessment  Goal: Absence of physical injury  1/1/2024 1136 by Ashanti Booth RN  Outcome: Progressing  Flowsheets (Taken 1/1/2024 1126)  Absence of Physical Injury: Implement safety measures based on patient assessment  12/31/2023 2248 by Virgilio Burns, RN  Outcome: Progressing     Problem: Pain  Goal: Verbalizes/displays adequate comfort level or baseline comfort level  1/1/2024 1136 by Ashanti Booth RN  Outcome: Progressing  12/31/2023 2248 by Virgilio Burns, RN  Outcome: Progressing  Flowsheets (Taken 12/31/2023 1945)  Verbalizes/displays adequate comfort level or baseline comfort level: Encourage patient to monitor pain and request assistance     Problem: Chronic Conditions and Co-morbidities  Goal: Patient's chronic conditions and co-morbidity symptoms are monitored and maintained or improved  1/1/2024 1136 by Ashanti Booth RN  Outcome: Progressing  Flowsheets (Taken 1/1/2024 0930)  Care Plan - Patient's Chronic Conditions and Co-Morbidity Symptoms are Monitored and Maintained or Improved: Monitor and assess patient's chronic conditions  and comorbid symptoms for stability, deterioration, or improvement  12/31/2023 2248 by Virgilio Burns, RN  Outcome: Progressing  Flowsheets (Taken 12/31/2023 1945)  Care Plan - Patient's Chronic Conditions and Co-Morbidity Symptoms are Monitored and Maintained or Improved: Monitor and assess patient's chronic conditions and comorbid symptoms for stability, deterioration, or improvement     Problem: Musculoskeletal - Adult  Goal: Return ADL status to a safe level of function  1/1/2024 1136 by Ashanti Booth RN  Outcome: Progressing  Flowsheets (Taken 1/1/2024 0930)  Return ADL Status to a Safe Level of Function: Administer medication as ordered  12/31/2023 2248 by Virgilio Burns, RN  Outcome: Progressing  Flowsheets (Taken 12/31/2023 1945)  Return ADL Status to a Safe Level of Function: Administer medication as ordered     Problem: Metabolic/Fluid and Electrolytes - Adult  Goal: Electrolytes maintained within normal limits  1/1/2024 1136 by Ashanti Booth RN  Outcome: Progressing  Flowsheets (Taken 1/1/2024 0930)  Electrolytes maintained within normal limits:   Monitor labs and assess patient for signs and symptoms of electrolyte imbalances   Administer electrolyte replacement as ordered  12/31/2023 2248 by Virgilio Burns, RN  Outcome: Progressing  Flowsheets (Taken 12/31/2023 1945)  Electrolytes maintained within normal limits: Monitor labs and assess patient for signs and symptoms of electrolyte imbalances  Goal: Hemodynamic stability and optimal renal function maintained  1/1/2024 1136 by Ashanti Booth RN  Outcome: Progressing  Flowsheets (Taken 1/1/2024 0930)  Hemodynamic stability and optimal renal function maintained:   Monitor labs and assess for signs and symptoms of volume excess or deficit   Monitor intake, output and patient weight  12/31/2023 2248 by Virgilio Burns, RN  Outcome: Progressing  Flowsheets (Taken 12/31/2023 1945)  Hemodynamic stability and optimal

## 2024-01-01 NOTE — PROGRESS NOTES
Physical Medicine & Rehabilitation Progress Note    Chief Complaint:  Low back pain, generalized weakness after a fall    Subjective:    Deloris Watts is a 68 y.o. right-handed  female with history of hypertension, hyperlipidemia, hydronephrosis, fibromyalgia, irritable bowel syndrome, osteoporosis, end-stage renal disease on hemodialysis since January 2023, stroke with left side weakness in 1990s, anemia, depression, anxiety, right wrist and right ankle fracture treated conservatively, right proximal humeral fracture due to fall requiring ORIF, status post right carpal tunnel release surgery, status post cervical spine surgery, status post hysterectomy and bilateral oophorectomy, hemorrhoidectomy, sinus surgery, tubal ligation, LASEK surgery, was admitted on 12/21/2023 for intensive inpatient management of impairment & disability secondary to fall accident resulting L2 compression fracture requiring kyphoplasty, and liver laceration.       The patient says she was trying to put on her glasses standing in front on her dresser in the bedroom when suddenly she lost balance and fell backward to the floor at night of 12/14/2023.  She immediately feels severe pain on her lower back.  She did not lose consciousness.  She managed to crawl back to her bed at the time.  Her  called 911 and EMS brought the patient to [facility] ER in early morning of 12/15/2023.  She complains of low back pain and some pain at her head in ER.  Her blood pressure was 175/94 in ER.  Her BUN/creatinine was 51/6.1.  At home she take aspirin and Plavix.  CT of the head, cervical spine, abdomen and pelvis, and lumbar spine were done on 12/15/2023.  She was found to have new L2 inferior endplate fracture, and hepatic laceration/contusion.  Nephrology was consulted to continue her hemodialysis.  MRI of thoracic and lumbar spine were ordered and performed on 12/15/2023.  Lumbar spine MRI done on 12/15/2023 confirmed  the presence of acute L2 20% inferior endplate compression fracture without displacement of the fragment.  Thoracic spine MRI revealed no fracture or disc protrusion, and normal spinal cord. Neurosurgery was consulted on 12/16/2023 for L2 compression fracture and kyphoplasty was planned.  On 12/18/2023 the patient underwent L2 kyphoplasty for pathological L2 compression fracture by Dr. Nimisha Garza.  PM&R was consulted on 12/19/2023 and intensive inpatient rehab treatment was recommended.  The patient's hospital course also was complicated by constipation requiring digital disimpaction on 12/21/2023.  Her hospital course also was complicated by difficulty encountered in hemodialysis.  However interventional radiology central venous catheter check was done on 12/21/2023 and showed no evidence of fibrin sheath and normally functioning catheter lumens.    The patient says she had an episode of lower back pain during hemodialysis yesterday requiring taking pain medication.  She says her lower back is not painful this morning.  She denies having any pain at the present time.  She still has easy fatigue but denies having any weakness.  She has been tolerating the intensive rehab treatment well.    The patient is projected to be ready for discharge on 1/5/2024.      Rehabilitation:  PT: Reviewed.    Bed Mobility:  Rolling to Left: Modified Independent   Rolling to Right: Modified Independent   Supine to Sit: Supervision  Sit to Supine: Supervision      Transfers:  Sit to Stand:Supervision, cues for hand placement  Stand to Sit:Supervision, cues for hand placement     Ambulation:  Stand By Assistance  Distance: 200 ft. X2; 30 ft. x1  Surface: Level Tile  Device: 4 Wheeled Walker  Gait Deviations:  Forward Flexed Posture, Slow Anna, Decreased Step Length Bilaterally, Decreased Gait Speed, Decreased Heel Strike Bilaterally, and Mild Path Deviations     Stairs:  Stairs: 6\" steps X 4 using both hands on left ascending rail

## 2024-01-01 NOTE — FLOWSHEET NOTE
01/01/24 1215 01/01/24 1510   Vital Signs   BP (!) 162/68 (!) 161/71   Temp 98.5 °F (36.9 °C) 98.1 °F (36.7 °C)   Pulse 81 69   Respirations 18 20   Weight - Scale 44.6 kg (98 lb 5.2 oz) 43.6 kg (96 lb 1.9 oz)   Weight Method Bed scale Bed scale   Percent Weight Change 5.94 -2.24   Post-Hemodialysis Assessment   Post-Treatment Procedures  --  Blood returned;Catheter Capped, clamped with Saline x2 ports   Machine Disinfection Process  --  Acid/Vinegar Clean;Heat Disinfect;Exterior Machine Disinfection   Blood Volume Processed (Liters)  --  50.1 L   Dialyzer Clearance  --  Lightly streaked   Duration of Treatment (minutes)  --  150 minutes   Heparin Amount Administered During Treatment (mL)  --  0 mL   Hemodialysis Intake (ml)  --  400 ml   Hemodialysis Output (ml)  --  1400 ml   NET Removed (ml)  --  1000     Stable 2.5 hour treatment complete. Removed 1 liter of fluid as per order. Tolerated fluid removal well. HD catheter ports flushed, clamped and capped. Dressing clean, dry and intact. Report given to primary RN. Treatment record printed for scanning into EMR.

## 2024-01-01 NOTE — PROGRESS NOTES
Patient: Deloris Watts  Unit/Bed: 8K-02/002-A  YOB: 1955  MRN: [id number] Acct: [id number]   Admitting Diagnosis: Traumatic compression fracture of L2 lumbar vertebra, closed, initial encounter ([facility]) [S32.020A]  Admit Date:  12/21/2023  Hospital Day: 11    Assessment:     Principal Problem:    Traumatic compression fracture of L2 lumbar vertebra, closed, initial encounter ([facility])  Active Problems:    Moderate malnutrition ([facility])    History of CVA (cerebrovascular accident)    Allergic rhinitis    Essential hypertension    Age related osteoporosis    ESRD (end stage renal disease) ([facility])    Hyperkalemia    Anemia, chronic disease    Secondary hyperparathyroidism of renal origin ([facility])    Liver injury, laceration    Closed compression fracture of L2 lumbar vertebra, initial encounter ([facility])  Resolved Problems:    * No resolved hospital problems. *      Plan:     Continue to follow        Subjective:     Patient has no complaint of CP or SOB..   Medication side effects: none    Scheduled Meds:   diclofenac sodium  2 g Topical 4x Daily    traZODone  50 mg Oral Nightly    sevelamer  800 mg Oral TID WC    senna  2 tablet Oral Nightly    polyethylene glycol  17 g Oral BID    montelukast  10 mg Oral Daily    folic acid  500 mcg Oral Daily    fluticasone  2 spray Each Nostril Daily    famotidine  10 mg Oral Daily    docusate sodium  1 enema Rectal Daily    docusate sodium  100 mg Oral BID    cetirizine  5 mg Oral Daily    calcitRIOL  0.25 mcg Oral Daily    atorvastatin  10 mg Oral Nightly    amLODIPine  5 mg Oral Daily    linaclotide  290 mcg Oral QAM AC    melatonin  6 mg Oral Nightly    clopidogrel  75 mg Oral Daily     Continuous Infusions:   sodium chloride       PRN Meds:traMADol, sodium chloride flush, phenol, ondansetron, cyclobenzaprine, sodium chloride, acetaminophen, traZODone, bisacodyl, diclofenac sodium    Review of Systems  Pertinent items are noted in HPI.    Objective:     Patient Vitals for the

## 2024-01-01 NOTE — PROGRESS NOTES
[facility]   Nephrology progress note  1/1/2024, 2:03 PM      Pt Name:    Deloris Watts  MRN:     [id number]     YOB: 1955  Admit Date:    12/21/2023  1:13 PM    Chief Complaint: Nephrology following for ESRD and hemodialysis    Subjective:  Patient was seen and examined this morning  Feels ok no complaints.  Objective:  24HR INTAKE/OUTPUT:    Intake/Output Summary (Last 24 hours) at 1/1/2024 1403  Last data filed at 1/1/2024 0557  Gross per 24 hour   Intake 390 ml   Output --   Net 390 ml      Admission weight: 47 kg (103 lb 9.9 oz)  Wt Readings from Last 3 Encounters:   01/01/24 44.6 kg (98 lb 5.2 oz)   12/20/23 47 kg (103 lb 9.9 oz)   12/15/23 45.4 kg (100 lb)        Vitals :   Vitals:    12/31/23 1945 01/01/24 0557 01/01/24 0854 01/01/24 1215   BP: (!) 154/75  (!) 166/73 (!) 162/68   Pulse: 78  88 81   Resp: 18   18   Temp: 97.3 °F (36.3 °C)  97.7 °F (36.5 °C) 98.5 °F (36.9 °C)   TempSrc: Oral      SpO2: 96%  97%    Weight:  42.1 kg (92 lb 13 oz)  44.6 kg (98 lb 5.2 oz)   Height:           Physical examination  General Appearance: Awake and alert no no distress  Mouth/Throat: Oral mucosa moist  Neck: Accessory muscle  Lungs: Clear  Heart:  S1, S2 heard  GI: soft, non-tender, no guarding  Extremities no edema    Medications:  Infusion:    sodium chloride       Meds:    diclofenac sodium  2 g Topical 4x Daily    traZODone  50 mg Oral Nightly    sevelamer  800 mg Oral TID WC    senna  2 tablet Oral Nightly    polyethylene glycol  17 g Oral BID    montelukast  10 mg Oral Daily    folic acid  500 mcg Oral Daily    fluticasone  2 spray Each Nostril Daily    famotidine  10 mg Oral Daily    docusate sodium  1 enema Rectal Daily    docusate sodium  100 mg Oral BID    cetirizine  5 mg Oral Daily    calcitRIOL  0.25 mcg Oral Daily    atorvastatin  10 mg Oral Nightly    amLODIPine  5 mg Oral Daily    linaclotide  290 mcg Oral QAM AC    melatonin  6 mg Oral Nightly    clopidogrel  75 mg

## 2024-01-01 NOTE — PROGRESS NOTES
Patient educated on how to use incentive spirometer. Patient verbalized understanding and demonstrated proper use. Emphasized importance and usage of device, with coughing and deep breathing every 4 hours while awake. Electronically signed by Virgilio Burns RN on 12/31/2023 at 9:35 PM

## 2024-01-01 NOTE — PLAN OF CARE
Problem: Safety - Adult  Goal: Free from fall injury  12/31/2023 2248 by Virgilio Burns, RN  Outcome: Progressing     Problem: ABCDS Injury Assessment  Goal: Absence of physical injury  12/31/2023 2248 by Virgilio Burns, RN  Outcome: Progressing     Problem: Skin/Tissue Integrity  Goal: Absence of new skin breakdown  Description: 1.  Monitor for areas of redness and/or skin breakdown  2.  Assess vascular access sites hourly  3.  Every 4-6 hours minimum:  Change oxygen saturation probe site  4.  Every 4-6 hours:  If on nasal continuous positive airway pressure, respiratory therapy assess nares and determine need for appliance change or resting period.  12/31/2023 2248 by Virgilio Burns, RN  Outcome: Progressing     Discussed the importance of incentive spirometry.    Care plan reviewed with patient.  Patient verbalize understanding of the plan of care and contribute to goal setting.

## 2024-01-02 LAB
ANION GAP SERPL CALC-SCNC: 13 MEQ/L (ref 8–16)
BUN SERPL-MCNC: 42 MG/DL (ref 7–22)
CALCIUM SERPL-MCNC: 10 MG/DL (ref 8.5–10.5)
CHLORIDE SERPL-SCNC: 102 MEQ/L (ref 98–111)
CO2 SERPL-SCNC: 24 MEQ/L (ref 23–33)
CREAT SERPL-MCNC: 4.7 MG/DL (ref 0.4–1.2)
GFR SERPL CREATININE-BSD FRML MDRD: 9 ML/MIN/1.73M2
GLUCOSE SERPL-MCNC: 153 MG/DL (ref 70–108)
POTASSIUM SERPL-SCNC: 4.6 MEQ/L (ref 3.5–5.2)
SODIUM SERPL-SCNC: 139 MEQ/L (ref 135–145)

## 2024-01-02 PROCEDURE — 6370000000 HC RX 637 (ALT 250 FOR IP): Performed by: NURSE PRACTITIONER

## 2024-01-02 PROCEDURE — 97130 THER IVNTJ EA ADDL 15 MIN: CPT

## 2024-01-02 PROCEDURE — 97110 THERAPEUTIC EXERCISES: CPT

## 2024-01-02 PROCEDURE — 97535 SELF CARE MNGMENT TRAINING: CPT

## 2024-01-02 PROCEDURE — 90791 PSYCH DIAGNOSTIC EVALUATION: CPT | Performed by: PSYCHOLOGIST

## 2024-01-02 PROCEDURE — 97116 GAIT TRAINING THERAPY: CPT

## 2024-01-02 PROCEDURE — 97129 THER IVNTJ 1ST 15 MIN: CPT

## 2024-01-02 PROCEDURE — 36415 COLL VENOUS BLD VENIPUNCTURE: CPT

## 2024-01-02 PROCEDURE — 97530 THERAPEUTIC ACTIVITIES: CPT

## 2024-01-02 PROCEDURE — 97112 NEUROMUSCULAR REEDUCATION: CPT

## 2024-01-02 PROCEDURE — 1180000000 HC REHAB R&B

## 2024-01-02 PROCEDURE — 99232 SBSQ HOSP IP/OBS MODERATE 35: CPT | Performed by: PHYSICAL MEDICINE & REHABILITATION

## 2024-01-02 PROCEDURE — 6370000000 HC RX 637 (ALT 250 FOR IP): Performed by: PHYSICAL MEDICINE & REHABILITATION

## 2024-01-02 PROCEDURE — 99232 SBSQ HOSP IP/OBS MODERATE 35: CPT | Performed by: INTERNAL MEDICINE

## 2024-01-02 PROCEDURE — 80048 BASIC METABOLIC PNL TOTAL CA: CPT

## 2024-01-02 RX ADMIN — TRAZODONE HYDROCHLORIDE 50 MG: 50 TABLET ORAL at 21:29

## 2024-01-02 RX ADMIN — SEVELAMER CARBONATE 800 MG: 800 TABLET, FILM COATED ORAL at 08:24

## 2024-01-02 RX ADMIN — MONTELUKAST SODIUM 10 MG: 10 TABLET ORAL at 08:24

## 2024-01-02 RX ADMIN — FOLIC ACID 500 MCG: 1 TABLET ORAL at 08:23

## 2024-01-02 RX ADMIN — CETIRIZINE HYDROCHLORIDE 5 MG: 5 TABLET ORAL at 08:24

## 2024-01-02 RX ADMIN — AMLODIPINE BESYLATE 5 MG: 5 TABLET ORAL at 08:24

## 2024-01-02 RX ADMIN — FLUTICASONE PROPIONATE 2 SPRAY: 50 SPRAY, METERED NASAL at 08:25

## 2024-01-02 RX ADMIN — Medication 6 MG: at 21:29

## 2024-01-02 RX ADMIN — FAMOTIDINE 10 MG: 20 TABLET ORAL at 08:24

## 2024-01-02 RX ADMIN — SEVELAMER CARBONATE 800 MG: 800 TABLET, FILM COATED ORAL at 12:32

## 2024-01-02 RX ADMIN — CALCITRIOL CAPSULES 0.25 MCG 0.25 MCG: 0.25 CAPSULE ORAL at 08:24

## 2024-01-02 RX ADMIN — ATORVASTATIN CALCIUM 10 MG: 10 TABLET, FILM COATED ORAL at 21:29

## 2024-01-02 RX ADMIN — SEVELAMER CARBONATE 800 MG: 800 TABLET, FILM COATED ORAL at 16:47

## 2024-01-02 RX ADMIN — CLOPIDOGREL BISULFATE 75 MG: 75 TABLET ORAL at 08:24

## 2024-01-02 ASSESSMENT — PAIN - FUNCTIONAL ASSESSMENT: PAIN_FUNCTIONAL_ASSESSMENT: ACTIVITIES ARE NOT PREVENTED

## 2024-01-02 ASSESSMENT — ENCOUNTER SYMPTOMS
SHORTNESS OF BREATH: 0
WHEEZING: 0
ABDOMINAL PAIN: 0
TROUBLE SWALLOWING: 0
COUGH: 0
SORE THROAT: 0
CONSTIPATION: 0
NAUSEA: 0
RHINORRHEA: 0
DIARRHEA: 0
VOMITING: 0

## 2024-01-02 ASSESSMENT — PAIN DESCRIPTION - DESCRIPTORS: DESCRIPTORS: ACHING

## 2024-01-02 ASSESSMENT — PAIN DESCRIPTION - LOCATION: LOCATION: ABDOMEN

## 2024-01-02 ASSESSMENT — PAIN SCALES - GENERAL
PAINLEVEL_OUTOF10: 3
PAINLEVEL_OUTOF10: 0

## 2024-01-02 ASSESSMENT — PAIN DESCRIPTION - ONSET: ONSET: ON-GOING

## 2024-01-02 ASSESSMENT — PAIN DESCRIPTION - PAIN TYPE: TYPE: ACUTE PAIN

## 2024-01-02 ASSESSMENT — PAIN DESCRIPTION - FREQUENCY: FREQUENCY: CONTINUOUS

## 2024-01-02 ASSESSMENT — PAIN DESCRIPTION - ORIENTATION: ORIENTATION: MID

## 2024-01-02 NOTE — PROGRESS NOTES
Goals  Time Frame for Short Term Goals: 1 week  Short Term Goal 1: Patient will complete supine < > sit with head of bed flat and no rails with supervision to transfer in and out of bed with decreased difficulty.  Short Term Goal 2: Patient will complete sit < > stand with SBA and less than or equal to 25% verbal cues for hand placement to stand to ambulate with increased safety.  Short Term Goal 3: Patient will ambulate 50' with a RW and SBA to progress towards navigating home safely. GOAL MET, SEE LTG  Short Term Goal 4: Patient will ascend/descend 2 steps with 1 handrail and CGA to access leave home.  Short Term Goal 5: Patient will improve tinetti to greater than or equal to 19/28 to reduce her risk for falls.  Long Term Goals  Time Frame for Long Term Goals : 3 weeks  Long Term Goal 1: Patient will complete supine < > sit with modified independence to transfer in and out of bed safely.  Long Term Goal 2: Patient will complete sit < > stand with modified independence to stand to ambulate safely.  Long Term Goal 3: Patient will complete bed < > chair transfer with modified independence with a RW to transfer surface to surface safely.  Long Term Goal 4: Patient will ambulate 150' with a 4WW and modified independence to navigate home safely.  Long Term Goal 5: Patient will ascend/descend 2 steps with hand held assistance and CGA to access/leave home safely.  Additional Goals?: Yes  Long term goal 6: Patient will complete car transfer with modified independence to transfer in and out of vehicle safely.  Long term goal 7: Patient will improave TUG to less than or equal to 14 sec. to reduce her risk for falls.    Following session, patient left in safe position with all fall risk precautions in place.

## 2024-01-02 NOTE — PROGRESS NOTES
[facility]   Nephrology progress note  1/2/2024, 11:38 AM      Pt Name:    Deloris Watts  MRN:     [id number]     YOB: 1955  Admit Date:    12/21/2023  1:13 PM    Chief Complaint: Nephrology following for ESRD and hemodialysis    Subjective:  Patient was seen and examined this morning  No chest pain or shortness of breath  Rehab going on well no other complaints    Objective:  24HR INTAKE/OUTPUT:    Intake/Output Summary (Last 24 hours) at 1/2/2024 1138  Last data filed at 1/2/2024 0956  Gross per 24 hour   Intake 1300 ml   Output 1400 ml   Net -100 ml        Admission weight: 47 kg (103 lb 9.9 oz)  Wt Readings from Last 3 Encounters:   01/02/24 44.7 kg (98 lb 8.7 oz)   12/20/23 47 kg (103 lb 9.9 oz)   12/15/23 45.4 kg (100 lb)        Vitals :   Vitals:    01/01/24 2203 01/01/24 2206 01/02/24 0432 01/02/24 0817   BP: (!) 145/66   (!) 152/69   Pulse: 87   98   Resp: 18 16  16   Temp: 98.2 °F (36.8 °C)   97.5 °F (36.4 °C)   TempSrc: Oral   Oral   SpO2: 95%   98%   Weight:   44.7 kg (98 lb 8.7 oz)    Height:           Physical examination  General Appearance: Awake and alert no no distress  Mouth/Throat: Oral mucosa moist  Neck: Accessory muscle  Lungs: Clear  Heart:  S1, S2 heard  GI: soft, non-tender, no guarding  Extremities no edema    Medications:  Infusion:    sodium chloride       Meds:    diclofenac sodium  2 g Topical 4x Daily    traZODone  50 mg Oral Nightly    sevelamer  800 mg Oral TID WC    senna  2 tablet Oral Nightly    polyethylene glycol  17 g Oral BID    montelukast  10 mg Oral Daily    folic acid  500 mcg Oral Daily    fluticasone  2 spray Each Nostril Daily    famotidine  10 mg Oral Daily    docusate sodium  1 enema Rectal Daily    docusate sodium  100 mg Oral BID    cetirizine  5 mg Oral Daily    calcitRIOL  0.25 mcg Oral Daily    atorvastatin  10 mg Oral Nightly    amLODIPine  5 mg Oral Daily    linaclotide  290 mcg Oral QAM AC    melatonin  6 mg Oral Nightly     clopidogrel  75 mg Oral Daily     Lab Data :  CBC:   Recent Labs     01/01/24  0645   WBC 6.9   HGB 10.1*   HCT 33.7*          CMP:  Recent Labs     12/31/23  0639 01/01/24  0645 01/02/24  0808    140 139   K 4.7 4.6 4.6    104 102   CO2 23 23 24   BUN 65* 74* 42*   CREATININE 6.4* 6.7* 4.7*   GLUCOSE 104 95 153*   CALCIUM 9.8 9.6 10.0       Hepatic:   No results for input(s): \"LABALBU\", \"AST\", \"ALT\", \"ALB\", \"BILITOT\", \"ALKPHOS\" in the last 72 hours.      Assessment and Plan:  ESRD on hemodialysis Monday Wednesday Friday  Volume status stable electrolytes stable as well  No acute need for dialysis today  Mild metabolic acidosis.   Electrolytes within normal limits  Chronic diastolic dysfunction well compensated  Essential hypertension  Status post fall with fractured L2 status post kyphoplasty 12/18/2023  Essential hypertension  Secondary hyperparathyroidism    To Cuadra MD  [facility]    This report has been created using voice recognition software. It may contain minor errors which are inherent in voice recognition technology

## 2024-01-02 NOTE — PLAN OF CARE
Problem: Discharge Planning  Goal: Discharge to home or other facility with appropriate resources  1/2/2024 1329 by Lupe Thakur, RN  Outcome: Progressing  Flowsheets (Taken 1/2/2024 1329)  Discharge to home or other facility with appropriate resources:   Identify barriers to discharge with patient and caregiver   Arrange for needed discharge resources and transportation as appropriate   Identify discharge learning needs (meds, wound care, etc)   Refer to discharge planning if patient needs post-hospital services based on physician order or complex needs related to functional status, cognitive ability or social support system     Problem: Safety - Adult  Goal: Free from fall injury  1/2/2024 1329 by Lupe Thakur, RN  Outcome: Progressing  Flowsheets (Taken 1/2/2024 1329)  Free From Fall Injury: Instruct family/caregiver on patient safety     Problem: ABCDS Injury Assessment  Goal: Absence of physical injury  1/2/2024 1329 by Lupe Thakur, RN  Outcome: Progressing  Flowsheets (Taken 1/2/2024 1329)  Absence of Physical Injury: Implement safety measures based on patient assessment     Problem: Pain  Goal: Verbalizes/displays adequate comfort level or baseline comfort level  Outcome: Progressing  Flowsheets (Taken 1/2/2024 1329)  Verbalizes/displays adequate comfort level or baseline comfort level:   Encourage patient to monitor pain and request assistance   Assess pain using appropriate pain scale   Administer analgesics based on type and severity of pain and evaluate response   Implement non-pharmacological measures as appropriate and evaluate response   Notify Licensed Independent Practitioner if interventions unsuccessful or patient reports new pain     Problem: Chronic Conditions and Co-morbidities  Goal: Patient's chronic conditions and co-morbidity symptoms are monitored and maintained or improved  Outcome: Progressing  Flowsheets (Taken 1/2/2024 1329)  Care Plan - Patient's Chronic Conditions and  Optimize nutritional status  Outcome: Progressing   Care plan reviewed with patient.  Patient verbalizes understanding of the care plan and contributed to goal setting.

## 2024-01-02 NOTE — PLAN OF CARE
Problem: Discharge Planning  Goal: Discharge to home or other facility with appropriate resources  Outcome: Progressing  Flowsheets (Taken 1/1/2024 0930 by Ashanti Booth, RN)  Discharge to home or other facility with appropriate resources: Identify barriers to discharge with patient and caregiver  Note: Patient continues to progress with therapy. Social service working on discharge needs. Patient plans to discharge home on 1/5/2024 with OT.     Problem: Safety - Adult  Goal: Free from fall injury  Outcome: Progressing  Flowsheets (Taken 1/1/2024 1126 by Ashanti Booth, RN)  Free From Fall Injury: Instruct family/caregiver on patient safety  Note: No falls sustained at this time. Patient alert to call light and uses appropriately to alert staff of needs. Bed/chair alarm in use.       Problem: Skin/Tissue Integrity  Goal: Absence of new skin breakdown  Description: 1.  Monitor for areas of redness and/or skin breakdown  2.  Assess vascular access sites hourly  3.  Every 4-6 hours minimum:  Change oxygen saturation probe site  4.  Every 4-6 hours:  If on nasal continuous positive airway pressure, respiratory therapy assess nares and determine need for appliance change or resting period.  Outcome: Progressing  Note: No new skin issues noted this shift.      Care plan reviewed with patient. Patient verbalizes understanding of the plan of care and contributes toward goal progression.

## 2024-01-02 NOTE — PROGRESS NOTES
Patient: Deloris Watts  Unit/Bed: 8K-02/002-A  YOB: 1955  MRN: [id number] Acct: [id number]   Admitting Diagnosis: Traumatic compression fracture of L2 lumbar vertebra, closed, initial encounter (Former[facility]) [S32.020A]  Admit Date:  12/21/2023  Hospital Day: 12    Assessment:     Principal Problem:    Traumatic compression fracture of L2 lumbar vertebra, closed, initial encounter ([facility])  Active Problems:    Moderate malnutrition ([facility])    History of CVA (cerebrovascular accident)    Allergic rhinitis    Essential hypertension    Age related osteoporosis    ESRD (end stage renal disease) ([facility])    Hyperkalemia    Anemia, chronic disease    Secondary hyperparathyroidism of renal origin ([facility])    Liver injury, laceration    Closed compression fracture of L2 lumbar vertebra, initial encounter ([facility])  Resolved Problems:    * No resolved hospital problems. *      Plan:     Continue to follow        Subjective:     Patient has no complaint of CP or SOB..   Medication side effects: none    Scheduled Meds:   diclofenac sodium  2 g Topical 4x Daily    traZODone  50 mg Oral Nightly    sevelamer  800 mg Oral TID WC    senna  2 tablet Oral Nightly    polyethylene glycol  17 g Oral BID    montelukast  10 mg Oral Daily    folic acid  500 mcg Oral Daily    fluticasone  2 spray Each Nostril Daily    famotidine  10 mg Oral Daily    docusate sodium  1 enema Rectal Daily    docusate sodium  100 mg Oral BID    cetirizine  5 mg Oral Daily    calcitRIOL  0.25 mcg Oral Daily    atorvastatin  10 mg Oral Nightly    amLODIPine  5 mg Oral Daily    linaclotide  290 mcg Oral QAM AC    melatonin  6 mg Oral Nightly    clopidogrel  75 mg Oral Daily     Continuous Infusions:   sodium chloride       PRN Meds:traMADol, sodium chloride flush, phenol, ondansetron, cyclobenzaprine, sodium chloride, acetaminophen, traZODone, bisacodyl, diclofenac sodium    Review of Systems  Pertinent items are noted in HPI.    Objective:     Patient Vitals for the
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
## 2024-01-02 NOTE — PROGRESS NOTES
Physical Medicine & Rehabilitation Progress Note    Chief Complaint:  Low back pain, generalized weakness after a fall    Subjective:    Deloris Watts is a 68 y.o. right-handed  female with history of hypertension, hyperlipidemia, hydronephrosis, fibromyalgia, irritable bowel syndrome, osteoporosis, end-stage renal disease on hemodialysis since January 2023, stroke with left side weakness in 1990s, anemia, depression, anxiety, right wrist and right ankle fracture treated conservatively, right proximal humeral fracture due to fall requiring ORIF, status post right carpal tunnel release surgery, status post cervical spine surgery, status post hysterectomy and bilateral oophorectomy, hemorrhoidectomy, sinus surgery, tubal ligation, LASEK surgery, was admitted on 12/21/2023 for intensive inpatient management of impairment & disability secondary to fall accident resulting L2 compression fracture requiring kyphoplasty, and liver laceration.       The patient says she was trying to put on her glasses standing in front on her dresser in the bedroom when suddenly she lost balance and fell backward to the floor at night of 12/14/2023.  She immediately feels severe pain on her lower back.  She did not lose consciousness.  She managed to crawl back to her bed at the time.  Her  called 911 and EMS brought the patient to [place] [facility] ER in early morning of 12/15/2023.  She complains of low back pain and some pain at her head in ER.  Her blood pressure was 175/94 in ER.  Her BUN/creatinine was 51/6.1.  At home she take aspirin and Plavix.  CT of the head, cervical spine, abdomen and pelvis, and lumbar spine were done on 12/15/2023.  She was found to have new L2 inferior endplate fracture, and hepatic laceration/contusion.  Nephrology was consulted to continue her hemodialysis.  MRI of thoracic and lumbar spine were ordered and performed on 12/15/2023.  Lumbar spine MRI done on 12/15/2023 confirmed  1/5/2024.      Plan:  Continue intensive PT/OT/SLP/RT inpatient rehabilitation program at least 3 hours per day, 5 days per week in order to improve functional status prior to discharge.  Family education and training will be completed.  Equipment evaluations and recommendations will be completed as appropriate.       Rehabilitation nursing continue to be involved for bowel, bladder, skin, and pain management.  Nursing will also provide education and training to patient and family.    Prophylaxis:  DVT: ACE stocking, intermittent pneumatic compression device.  GI: Colace, Enemeez enema, GlycoLax, Senokot, lactulose, Dulcolax suppository as needed  Pain: Tylenol as needed, Flexeril as needed, tramadol as needed, Voltaren gel as needed  Continue scheduled Voltaren gel application to right hip and lower back painful area for pain control  Continue Norvasc for hypertension  Continue Lipitor for hyperlipidemia  Continue calcitriol, sevelamer for hypocalcemia and hyperphosphatemia due to renal failure  Continue Pepcid for gastric protection  Continue Linzess for irritable bowel syndrome  Continue Singulair, Flonase for allergic rhinitis  Continue folic acid supplement  Continue Plavix for secondary stroke prevention  Continue melatonin, trazodone as needed for insomnia  Continues hemodialysis (Monday, Wednesday and Friday) under the direction of nephrology service  Nutrition: Continue regular diet  Bladder: Monitoring signs or symptoms of UTI  Bowel: Monitoring signs or symptoms of constipation   and case management for coordination of care and discharge planning      Missed Therapy Time:  None      ANTON DAVIS MD

## 2024-01-02 NOTE — PROGRESS NOTES
[facility]  INPATIENT SPEECH THERAPY  [facility]  DAILY NOTE    TIME   SLP Individual Minutes  Time In: 1130  Time Out: 1200  Minutes: 30  Timed Code Treatment Minutes: 30 Minutes       Date: 2024  Patient Name: Deloris Watts      CSN: [id number]   : 1955  (68 y.o.)  Gender: female   Referring Physician:  Holly Whitaker APRN - CNP  Diagnosis: Traumatic compression fracture of L2 lumbar vertebra, closed, initial encounter  Precautions: fall risk  Current Diet: Regular Diet with Thin Liquids  Respiratory Status: Room Air  Swallowing Strategies: Standard Universal Swallow Precautions  Date of Last MBS/FEES: Not Applicable    Pain:  No pain reported.    Subjective: Patient resting in bed upon ST arrival; agreeable to transfer [place] for completion of hospital-wide navigation task. No family present.     Short-Term Goals:  SHORT TERM GOAL #1:  Goal 1: Patient will complete immediate/delayed recall tasks with 80% accuracy at YOSHI level, with or without use of compensatory strategies, to improve retention of novel and newly presented information.  INTERVENTIONS:   DNT due to focus on additional STGs; plans to target recall of hospital-wide navigation task in subsequent therapy session given time constraints.    SHORT TERM GOAL #2:  Goal 2: Patient will complete problem solving, visual/verbal reasoning, executive functioning tasks with 80% given min cues to improve return to IADLs/ADLs.  INTERVENTIONS:   DNT due to focus on additional STGs.    SHORT TERM GOAL #3:  Goal 3: Patient will complete sequencing and thought organization tasks with 80% accuracy given min cues to improve mental flexibility.  INTERVENTIONS:  DNT due to focus on additional STGs.    SHORT TERM GOAL #4:  Goal 4: Patient will complete complex sustained, selective, alternating, and divided attention tasks with no more than 3 errors/redirections in 5 minutes/task completion to improve mental  focus.  INTERVENTIONS:     Unit/Hospital Navigation Task:  independent,  with logical cuing,  with minimal cuing,  with maximum cuing  *At least minimal cuing to scan environment for signs/additional information with independent navigation/direction for task completion  *Patient with need for no cuing to maintain attention to task  *Reduced problem solving via math computation (time) evidenced with schedule navigation (I.e., \"How many hours of therapy?\")  *Overall, good success within task    Long-Term Goals:  Time Frame for Long Term Goals: 3 weeks    LONG TERM GOAL #1:  Goal 1: Patient will improve cognitive function to a level of supervision in order to make a safe return to OF.   GOAL NOT MET    Comprehension: 5 - Patient understands basic needs (hungry/hot/pain)  Expression: 6 - Device used to express complex ideas/needs  Social Interaction: 6 - Patient requires medication for mood and/or effect  Problem Solvin - Patient solves simple/routine tasks 75-90%+   Memory: 4 - Patient remembers 75-90%+ of the time    Functional Oral Intake Scale: Total Oral Intake: 7.  Total oral intake with no restrictions    EDUCATION:  Learner: Patient  Education:  Reviewed ST goals and Plan of Care and Reviewed recommendations for follow-up  Evaluation of Education: Verbalizes understanding and Needs further instruction    ASSESSMENT/PLAN:  Activity Tolerance:  Patient tolerance of  treatment: good.      Assessment/Plan: Patient progressing toward established goals.  Continues to require skilled care of licensed speech pathologist to progress toward achievement of established goals and plan of care..     Plan for Next Session: recall navigation, problem solving/safety awareness, delayed recall, attention  Discharge Recommendations: Outpatient Speech Therapy, Home Health, and Continue to Assess Pending Progress       Moisés Kirk M.S., CCC-SLP [id number]

## 2024-01-02 NOTE — PROGRESS NOTES
[facility]  Occupational Therapy  Daily Note  Time:    Time In: 0700  Time Out: 0800  Timed Code Treatment Minutes: 60 Minutes  Minutes: 60          Date: 2024  Patient Name: Deloris Watts,   Gender: female      Room: [facility]02/002-A  MRN: [id number]  : 1955  (68 y.o.)  Referring Practitioner: ordering:  Holly Whitaker APRN - CNP, attending: Jax Whatley MD  Diagnosis: traumatic compression fracture of L2 lumbar vertebra, closed initial encounter  Additional Pertinent Hx: Deloris is a 68 year old female whom presented to [facility] ED on 12/15/23 s/p fall on 23.  It is noted that pt has demonstrated reduced balance.  CT imaging of brain completed with no abnormalities, CT of cervical spine indicated fusion at C5-7 with no new abnormalities, CT of lumbar spine shows new inferior endplate fx at L2 and multiple levels of foraminal stenosis.  CT of abdomen/pelvis noted suggestive hepatic laceration/contusion.  It is noted pt has mild TTP of abdomen.  Pt. Underwent an L2 kyphoplasty on 23.  Pt. transferred to the IPR unit on 23 for further medical care and referred to hospitalsled OT services.    Restrictions/Precautions:  Restrictions/Precautions: Fall Risk, General Precautions  Position Activity Restriction  Other position/activity restrictions: Pt. s/p kyphoplasty, dialysis MWF with port in R chest region      SUBJECTIVE: Pt. Resting in bedside chair upon arrival and agreeable to OT session.     PAIN: 0/10: as pt denies pain    Vitals: Vitals not assessed per clinical judgement, see nursing flowsheet    COGNITION: Decreased Safety Awareness    ADL:   ADLs (dressing, bathing, oral care) completed prior to OT arrival.     Toileting: Supervision for clothing management, mod I ortiz care.  Nurse made aware of pt having BM.  Toilet Transfer: Mod I to/from [facility] with use of grab bar  Grooming: Supervision standing sinkside to  engage in hand hygiene s/p toileting routine    BALANCE:  Sitting Balance:  Modified Independent.    Standing Balance: Supervision.      BED MOBILITY:  Not Tested    TRANSFERS:  Sit to Stand:  Modified Independent. With good carryover with hand placement  Stand to Sit: Supervision. With cues for hand placement    FUNCTIONAL MOBILITY:  Assistive Device: 4 Wheeled Walker  Assist Level:  Supervision.   Distance: To and from bathroom and To and from therapy gym, within therapy apartment.    CGA to manage step into apartment with use of rollator        ADDITIONAL ACTIVITIES:  Pt completed BUE exs to increase strength required to complete ADL routine, x 1 set, x 15 reps with use of 1# dumbbell.  Initially, OT attempted to have pt utilize 2# however too difficult for pt utilize 2#.  Pt. Required 1-2 verbal cues for proper technique.   Pt. Engaged in IADL task of making bed thi date, pt able to complete with Supervision with and without use of AD with no LOB.       Modified Grainger Scale:  Not Applicable    ASSESSMENT:     Activity Tolerance:  Patient tolerance of  treatment: Good treatment tolerance       Discharge Recommendations: Home with Home Exercise Program  Equipment Recommendations: Other: Pt. has reacher, shower chair, elevated toilet, RW, rollator, w/c.  Plan: Times Per Week: 5x/wk for 60 minutes  Current Treatment Recommendations: Strengthening, Balance training, Functional mobility training, Endurance training, Return to work related activity, Self-Care / ADL, Safety education & training, Neuromuscular re-education, Cognitive reorientation, Equipment evaluation, education, & procurement, Patient/Caregiver education & training, Home management training, Coordination training, Gait training, Wheelchair mobility training    Education:  Learners: Patient  Home Exercise Program and Assistive Device Safety    Goals  Short Term Goals  Time Frame for Short Term Goals: 1 week  Short Term Goal 1: Pt. to retrieve/transport

## 2024-01-03 LAB
ANION GAP SERPL CALC-SCNC: 17 MEQ/L (ref 8–16)
ANION GAP SERPL CALC-SCNC: 18 MEQ/L (ref 8–16)
BASOPHILS ABSOLUTE: 0.1 THOU/MM3 (ref 0–0.1)
BASOPHILS NFR BLD AUTO: 1.4 %
BUN SERPL-MCNC: 18 MG/DL (ref 7–22)
BUN SERPL-MCNC: 58 MG/DL (ref 7–22)
CA-I BLD ISE-SCNC: 0.96 MMOL/L (ref 1.12–1.32)
CALCIUM SERPL-MCNC: 10.2 MG/DL (ref 8.5–10.5)
CALCIUM SERPL-MCNC: 9.4 MG/DL (ref 8.5–10.5)
CHLORIDE SERPL-SCNC: 101 MEQ/L (ref 98–111)
CHLORIDE SERPL-SCNC: 97 MEQ/L (ref 98–111)
CO2 SERPL-SCNC: 21 MEQ/L (ref 23–33)
CO2 SERPL-SCNC: 21 MEQ/L (ref 23–33)
CREAT SERPL-MCNC: 2.7 MG/DL (ref 0.4–1.2)
CREAT SERPL-MCNC: 6.1 MG/DL (ref 0.4–1.2)
DEPRECATED RDW RBC AUTO: 51.3 FL (ref 35–45)
EOSINOPHIL NFR BLD AUTO: 2.6 %
EOSINOPHILS ABSOLUTE: 0.2 THOU/MM3 (ref 0–0.4)
ERYTHROCYTE [DISTWIDTH] IN BLOOD BY AUTOMATED COUNT: 15.2 % (ref 11.5–14.5)
GFR SERPL CREATININE-BSD FRML MDRD: 18 ML/MIN/1.73M2
GFR SERPL CREATININE-BSD FRML MDRD: 7 ML/MIN/1.73M2
GLUCOSE BLD STRIP.AUTO-MCNC: 82 MG/DL (ref 70–108)
GLUCOSE SERPL-MCNC: 101 MG/DL (ref 70–108)
GLUCOSE SERPL-MCNC: 98 MG/DL (ref 70–108)
HCT VFR BLD AUTO: 37.9 % (ref 37–47)
HGB BLD-MCNC: 11.6 GM/DL (ref 12–16)
IMM GRANULOCYTES # BLD AUTO: 0.06 THOU/MM3 (ref 0–0.07)
IMM GRANULOCYTES NFR BLD AUTO: 0.8 %
LYMPHOCYTES ABSOLUTE: 3.1 THOU/MM3 (ref 1–4.8)
LYMPHOCYTES NFR BLD AUTO: 41.7 %
MAGNESIUM SERPL-MCNC: 2.2 MG/DL (ref 1.6–2.4)
MCH RBC QN AUTO: 28 PG (ref 26–33)
MCHC RBC AUTO-ENTMCNC: 30.6 GM/DL (ref 32.2–35.5)
MCV RBC AUTO: 91.5 FL (ref 81–99)
MONOCYTES ABSOLUTE: 0.6 THOU/MM3 (ref 0.4–1.3)
MONOCYTES NFR BLD AUTO: 7.9 %
NEUTROPHILS NFR BLD AUTO: 45.6 %
NRBC BLD AUTO-RTO: 0 /100 WBC
PLATELET # BLD AUTO: 311 THOU/MM3 (ref 130–400)
PMV BLD AUTO: 9.2 FL (ref 9.4–12.4)
POTASSIUM SERPL-SCNC: 4.6 MEQ/L (ref 3.5–5.2)
POTASSIUM SERPL-SCNC: 4.8 MEQ/L (ref 3.5–5.2)
RBC # BLD AUTO: 4.14 MILL/MM3 (ref 4.2–5.4)
SEGMENTED NEUTROPHILS ABSOLUTE COUNT: 3.4 THOU/MM3 (ref 1.8–7.7)
SODIUM SERPL-SCNC: 136 MEQ/L (ref 135–145)
SODIUM SERPL-SCNC: 139 MEQ/L (ref 135–145)
WBC # BLD AUTO: 7.4 THOU/MM3 (ref 4.8–10.8)

## 2024-01-03 PROCEDURE — 97535 SELF CARE MNGMENT TRAINING: CPT

## 2024-01-03 PROCEDURE — 6370000000 HC RX 637 (ALT 250 FOR IP): Performed by: NURSE PRACTITIONER

## 2024-01-03 PROCEDURE — 97130 THER IVNTJ EA ADDL 15 MIN: CPT

## 2024-01-03 PROCEDURE — 97129 THER IVNTJ 1ST 15 MIN: CPT

## 2024-01-03 PROCEDURE — 82948 REAGENT STRIP/BLOOD GLUCOSE: CPT

## 2024-01-03 PROCEDURE — 97530 THERAPEUTIC ACTIVITIES: CPT

## 2024-01-03 PROCEDURE — 99232 SBSQ HOSP IP/OBS MODERATE 35: CPT | Performed by: INTERNAL MEDICINE

## 2024-01-03 PROCEDURE — 97110 THERAPEUTIC EXERCISES: CPT

## 2024-01-03 PROCEDURE — 83735 ASSAY OF MAGNESIUM: CPT

## 2024-01-03 PROCEDURE — 36415 COLL VENOUS BLD VENIPUNCTURE: CPT

## 2024-01-03 PROCEDURE — 90935 HEMODIALYSIS ONE EVALUATION: CPT

## 2024-01-03 PROCEDURE — 82330 ASSAY OF CALCIUM: CPT

## 2024-01-03 PROCEDURE — 99232 SBSQ HOSP IP/OBS MODERATE 35: CPT | Performed by: PHYSICAL MEDICINE & REHABILITATION

## 2024-01-03 PROCEDURE — 6360000002 HC RX W HCPCS: Performed by: INTERNAL MEDICINE

## 2024-01-03 PROCEDURE — 85025 COMPLETE CBC W/AUTO DIFF WBC: CPT

## 2024-01-03 PROCEDURE — 1180000000 HC REHAB R&B

## 2024-01-03 PROCEDURE — 6370000000 HC RX 637 (ALT 250 FOR IP): Performed by: PHYSICAL MEDICINE & REHABILITATION

## 2024-01-03 PROCEDURE — 97112 NEUROMUSCULAR REEDUCATION: CPT

## 2024-01-03 PROCEDURE — 97116 GAIT TRAINING THERAPY: CPT

## 2024-01-03 PROCEDURE — 80048 BASIC METABOLIC PNL TOTAL CA: CPT

## 2024-01-03 RX ORDER — CALCIUM GLUCONATE 10 MG/ML
1000 INJECTION, SOLUTION INTRAVENOUS ONCE
Status: COMPLETED | OUTPATIENT
Start: 2024-01-03 | End: 2024-01-03

## 2024-01-03 RX ADMIN — Medication 6 MG: at 21:20

## 2024-01-03 RX ADMIN — SEVELAMER CARBONATE 800 MG: 800 TABLET, FILM COATED ORAL at 14:55

## 2024-01-03 RX ADMIN — TRAMADOL HYDROCHLORIDE 50 MG: 50 TABLET, COATED ORAL at 10:14

## 2024-01-03 RX ADMIN — CETIRIZINE HYDROCHLORIDE 5 MG: 5 TABLET ORAL at 10:14

## 2024-01-03 RX ADMIN — SEVELAMER CARBONATE 800 MG: 800 TABLET, FILM COATED ORAL at 10:12

## 2024-01-03 RX ADMIN — SEVELAMER CARBONATE 800 MG: 800 TABLET, FILM COATED ORAL at 18:18

## 2024-01-03 RX ADMIN — FLUTICASONE PROPIONATE 2 SPRAY: 50 SPRAY, METERED NASAL at 10:15

## 2024-01-03 RX ADMIN — ATORVASTATIN CALCIUM 10 MG: 10 TABLET, FILM COATED ORAL at 21:20

## 2024-01-03 RX ADMIN — MONTELUKAST SODIUM 10 MG: 10 TABLET ORAL at 10:13

## 2024-01-03 RX ADMIN — FOLIC ACID 500 MCG: 1 TABLET ORAL at 10:13

## 2024-01-03 RX ADMIN — CLOPIDOGREL BISULFATE 75 MG: 75 TABLET ORAL at 10:13

## 2024-01-03 RX ADMIN — AMLODIPINE BESYLATE 5 MG: 5 TABLET ORAL at 10:14

## 2024-01-03 RX ADMIN — CALCIUM GLUCONATE 1000 MG: 10 INJECTION, SOLUTION INTRAVENOUS at 18:17

## 2024-01-03 RX ADMIN — FAMOTIDINE 10 MG: 20 TABLET ORAL at 10:13

## 2024-01-03 RX ADMIN — TRAZODONE HYDROCHLORIDE 50 MG: 50 TABLET ORAL at 21:20

## 2024-01-03 RX ADMIN — CALCITRIOL CAPSULES 0.25 MCG 0.25 MCG: 0.25 CAPSULE ORAL at 10:14

## 2024-01-03 RX ADMIN — ACETAMINOPHEN 650 MG: 325 TABLET ORAL at 13:24

## 2024-01-03 ASSESSMENT — PAIN SCALES - GENERAL
PAINLEVEL_OUTOF10: 6
PAINLEVEL_OUTOF10: 8
PAINLEVEL_OUTOF10: 4

## 2024-01-03 ASSESSMENT — PAIN DESCRIPTION - DESCRIPTORS
DESCRIPTORS: ACHING
DESCRIPTORS: ACHING
DESCRIPTORS: ACHING;DISCOMFORT

## 2024-01-03 ASSESSMENT — PAIN DESCRIPTION - ORIENTATION
ORIENTATION: MID
ORIENTATION: RIGHT

## 2024-01-03 ASSESSMENT — PAIN DESCRIPTION - LOCATION
LOCATION: ARM
LOCATION: HEAD
LOCATION: BACK

## 2024-01-03 ASSESSMENT — ENCOUNTER SYMPTOMS
COUGH: 0
NAUSEA: 0
WHEEZING: 0
CONSTIPATION: 0
TROUBLE SWALLOWING: 0
ABDOMINAL PAIN: 0
SORE THROAT: 0
DIARRHEA: 0
SHORTNESS OF BREATH: 0
RHINORRHEA: 0
BACK PAIN: 1
VOMITING: 0

## 2024-01-03 ASSESSMENT — PAIN - FUNCTIONAL ASSESSMENT: PAIN_FUNCTIONAL_ASSESSMENT: ACTIVITIES ARE NOT PREVENTED
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
## 2024-01-03 NOTE — PROGRESS NOTES
Physical Medicine & Rehabilitation Progress Note    Chief Complaint:  Low back pain, generalized weakness after a fall    Subjective:    Deloris Watts is a 68 y.o. right-handed  female with history of hypertension, hyperlipidemia, hydronephrosis, fibromyalgia, irritable bowel syndrome, osteoporosis, end-stage renal disease on hemodialysis since January 2023, stroke with left side weakness in 1990s, anemia, depression, anxiety, right wrist and right ankle fracture treated conservatively, right proximal humeral fracture due to fall requiring ORIF, status post right carpal tunnel release surgery, status post cervical spine surgery, status post hysterectomy and bilateral oophorectomy, hemorrhoidectomy, sinus surgery, tubal ligation, LASEK surgery, was admitted on 12/21/2023 for intensive inpatient management of impairment & disability secondary to fall accident resulting L2 compression fracture requiring kyphoplasty, and liver laceration.       The patient says she was trying to put on her glasses standing in front on her dresser in the bedroom when suddenly she lost balance and fell backward to the floor at night of 12/14/2023.  She immediately feels severe pain on her lower back.  She did not lose consciousness.  She managed to crawl back to her bed at the time.  Her  called 911 and EMS brought the patient to [facility] ER in early morning of 12/15/2023.  She complains of low back pain and some pain at her head in ER.  Her blood pressure was 175/94 in ER.  Her BUN/creatinine was 51/6.1.  At home she take aspirin and Plavix.  CT of the head, cervical spine, abdomen and pelvis, and lumbar spine were done on 12/15/2023.  She was found to have new L2 inferior endplate fracture, and hepatic laceration/contusion.  Nephrology was consulted to continue her hemodialysis.  MRI of thoracic and lumbar spine were ordered and performed on 12/15/2023.  Lumbar spine MRI done on 12/15/2023 confirmed  recommendations will be completed as appropriate.       Rehabilitation nursing continue to be involved for bowel, bladder, skin, and pain management.  Nursing will also provide education and training to patient and family.    Prophylaxis:  DVT: ACE stocking, intermittent pneumatic compression device.  GI: Colace, Enemeez enema, GlycoLax, Senokot, lactulose, Dulcolax suppository as needed  Pain: Tylenol as needed, Flexeril as needed, tramadol as needed, Voltaren gel as needed  Continue scheduled Voltaren gel application to lower back painful area for pain control  Continue Norvasc for hypertension  Continue Lipitor for hyperlipidemia  Continue calcitriol, sevelamer for hypocalcemia and hyperphosphatemia due to renal failure  Continue Pepcid for gastric protection  Continue Linzess for irritable bowel syndrome  Continue Singulair, Flonase for allergic rhinitis  Continue folic acid supplement  Continue Plavix for secondary stroke prevention  Continue melatonin, trazodone as needed for insomnia  Continues hemodialysis (Monday, Wednesday and Friday) under the direction of nephrology service  Nutrition: Continue regular diet  Bladder: Monitoring signs or symptoms of UTI  Bowel: Monitoring signs or symptoms of constipation   and case management for coordination of care and discharge planning      Missed Therapy Time:  None      ANTON DAVIS MD

## 2024-01-03 NOTE — FLOWSHEET NOTE
01/03/24 1430   Vital Signs   BP (!) 144/68   Temp 97.6 °F (36.4 °C)   Pulse 76   Respirations 21   SpO2 100 %   Weight - Scale 45.1 kg (99 lb 6.8 oz)   Weight Method Bed scale   Percent Weight Change -2.17   Post-Hemodialysis Assessment   Post-Treatment Procedures Blood returned;Catheter Capped, clamped with Saline x2 ports   Machine Disinfection Process Acid/Vinegar Clean;Heat Disinfect;Exterior Machine Disinfection   Rinseback Volume (ml) 400 ml   Blood Volume Processed (Liters) 46.5 L   Dialyzer Clearance Moderately streaked   Duration of Treatment (minutes) 150 minutes   Hemodialysis Output (ml) 1000 ml   Tolerated Treatment Fair     2.5 hour treatment completed. 1L of fluid removed. Patient tolerated fluid removal fair. pt c/o of headache, left leg and left wrist cramping, pt felt better after receiving Tylenol and 200 ml bolus of 0.9% NS, pt UF goal reduced. Cath lines flushed with 10 ml of 0.9 NS, clamped and tego secured. Report given to primary RN. Charting printed and to be scanned into EMR.

## 2024-01-03 NOTE — PROGRESS NOTES
[facility]  Occupational Therapy  Daily Note  Time:   Time In: 0900  Time Out: 1000  Timed Code Treatment Minutes: 60 Minutes  Minutes: 60          Date: 1/3/2024  Patient Name: Deloris Watts,   Gender: female      Room: [facility]02/002-A  MRN: [id number]  : 1955  (68 y.o.)  Referring Practitioner: ordering:  Holly Whitaker APRN - CNP, attending: Jax Whatley MD  Diagnosis: traumatic compression fracture of L2 lumbar vertebra, closed initial encounter  Additional Pertinent Hx: Deloris is a 68 year old female whom presented to [facility] ED on 12/15/23 s/p fall on 23.  It is noted that pt has demonstrated reduced balance.  CT imaging of brain completed with no abnormalities, CT of cervical spine indicated fusion at C5-7 with no new abnormalities, CT of lumbar spine shows new inferior endplate fx at L2 and multiple levels of foraminal stenosis.  CT of abdomen/pelvis noted suggestive hepatic laceration/contusion.  It is noted pt has mild TTP of abdomen.  Pt. Underwent an L2 kyphoplasty on 23.  Pt. transferred to the IPR unit on 23 for further medical care and referred to silled OT services.    Restrictions/Precautions:  Restrictions/Precautions: Fall Risk, General Precautions  Position Activity Restriction  Other position/activity restrictions: Pt. s/p kyphoplasty, dialysis MWF with port in R chest region     SUBJECTIVE: Patient seated in bedside chair upon arrival. Agreeable to OT session    PAIN: Denies    Vitals: Vitals not assessed per clinical judgement, see nursing flowsheet    COGNITION: Decreased Safety Awareness  Completed CAM/BIMs 13/15    ADL:     EATING:Independent.   .  CARE Score: 6.     ORAL HYGIENE:Independent (Standing sinkside with no LOB noted).   .  CARE Score: 6.     Grooming: Modified Independent standing sinkside for hair care and to apply makeup    BALANCE:  Sitting Balance:  Modified Independent.

## 2024-01-03 NOTE — PLAN OF CARE
Maintained or Improved:   Monitor and assess patient's chronic conditions and comorbid symptoms for stability, deterioration, or improvement   Collaborate with multidisciplinary team to address chronic and comorbid conditions and prevent exacerbation or deterioration   Update acute care plan with appropriate goals if chronic or comorbid symptoms are exacerbated and prevent overall improvement and discharge     Problem: Musculoskeletal - Adult  Goal: Return ADL status to a safe level of function  Outcome: Progressing     Problem: Metabolic/Fluid and Electrolytes - Adult  Goal: Electrolytes maintained within normal limits  Outcome: Progressing     Problem: Metabolic/Fluid and Electrolytes - Adult  Goal: Hemodynamic stability and optimal renal function maintained  Outcome: Progressing  Flowsheets (Taken 1/3/2024 1230)  Hemodynamic stability and optimal renal function maintained:   Monitor labs and assess for signs and symptoms of volume excess or deficit   Monitor intake, output and patient weight     Problem: Hematologic - Adult  Goal: Maintains hematologic stability  Outcome: Progressing     Problem: Skin/Tissue Integrity  Goal: Absence of new skin breakdown  Description: 1.  Monitor for areas of redness and/or skin breakdown  2.  Assess vascular access sites hourly  3.  Every 4-6 hours minimum:  Change oxygen saturation probe site  4.  Every 4-6 hours:  If on nasal continuous positive airway pressure, respiratory therapy assess nares and determine need for appliance change or resting period.  1/3/2024 1230 by Lupe Thakur, RN  Outcome: Progressing     Problem: Nutrition Deficit:  Goal: Optimize nutritional status  Outcome: Progressing   Care plan reviewed with patient.  Patient verbalizes understanding of the care plan and contributed to goal setting.

## 2024-01-03 NOTE — PROGRESS NOTES
[facility]  INPATIENT SPEECH THERAPY  [facility]  DAILY NOTE    TIME   SLP Individual Minutes  Time In: 0830  Time Out: 0900  Minutes: 30  Timed Code Treatment Minutes: 30 Minutes       Date: 1/3/2024  Patient Name: Deloris Watts      CSN: [id number]   : 1955  (68 y.o.)  Gender: female   Referring Physician:  Holly Whitaker APRN - CNP  Diagnosis: Traumatic compression fracture of L2 lumbar vertebra, closed, initial encounter  Precautions: fall risk  Current Diet: Regular Diet with Thin Liquids  Respiratory Status: Room Air  Swallowing Strategies: Standard Universal Swallow Precautions  Date of Last MBS/FEES: Not Applicable    Pain:  No pain reported.    Subjective: Patient sitting upright in recliner upon ST arrival; alert, pleasant, and cooperative throughout session. No family present. ST with conversation re: discharge recommendations for OP ST (and PT/OT) with verbal receptiveness noted.     Short-Term Goals:  SHORT TERM GOAL #1:  Goal 1: Patient will complete immediate/delayed recall tasks with 80% accuracy at YOSHI level, with or without use of compensatory strategies, to improve retention of novel and newly presented information.  INTERVENTIONS:     Delayed Recall of Hospital-Wide Navigation   Locations: 4/5 independent, 1/5 with logical cuing   Information: 1/5 independent, 1/5 with logical cuing, 3/5 with maximum cuing  *No independent initiation of strategy implementation    SHORT TERM GOAL #2:  Goal 2: Patient will complete problem solving, visual/verbal reasoning, executive functioning tasks with 80% given min cues to improve return to IADLs/ADLs.  INTERVENTIONS:   DNT due to focus on additional STGs.    SHORT TERM GOAL #3:  Goal 3: Patient will complete sequencing and thought organization tasks with 80% accuracy given min cues to improve mental flexibility.  INTERVENTIONS:    Visual Scanning/Navigation of Hospital Map:  independent,  with

## 2024-01-03 NOTE — PROGRESS NOTES
[facility]  INPATIENT PHYSICAL THERAPY  [facility] - [id number]-02/002-A  Daily Note    Time In: 0700  Time Out: 0830  Timed Code Treatment Minutes: 90 Minutes  Minutes: 90          Date: 1/3/2024  Patient Name: Deloris Watts,  Gender:  female        MRN: [id number]  : 1955  (68 y.o.)  Referral Date : 23  Referring Practitioner: Holly Whitaker APRN - CNP  Diagnosis: Traumatic compression fracture of L2 lumbar vertebra, closed, initial encounte  Treatment Diagnosis: difficulty in walking  Additional Pertinent Hx: Per H&P:Deloris Watts is a 68 y.o. right-handed  female with history of hypertension, hyperlipidemia, hydronephrosis, fibromyalgia, irritable bowel syndrome, osteoporosis, end-stage renal disease on hemodialysis since 2023, stroke with left side weakness in , anemia, depression, anxiety, right wrist and right ankle fracture treated conservatively, right proximal humeral fracture due to fall requiring ORIF, status post right carpal tunnel release surgery, status post cervical spine surgery, status post hysterectomy and bilateral oophorectomy, hemorrhoidectomy, sinus surgery, tubal ligation, LASEK surgery, is admitted to the inpatient rehabilitation unit on 2023 for intensive inpatient rehabilitation treatment of impairment and disability secondary to fall accident resulting L2 compression fracture requiring kyphoplasty, and liver laceration.She was found to have new L2 inferior endplate fracture, and hepatic laceration/contusion.  Nephrology was consulted to continue her hemodialysis.  MRI of thoracic and lumbar spine were ordered and performed on 12/15/2023.  Lumbar spine MRI done on 12/15/2023 confirmed the presence of acute L2 20% inferior endplate compression fracture without displacement of the fragment.  Thoracic spine MRI revealed no fracture or disc protrusion, and normal spinal cord. Neurosurgery was consulted on  handrail and CGA to access leave home.  Short Term Goal 5: Patient will improve tinetti to greater than or equal to 19/28 to reduce her risk for falls.  Long Term Goals  Time Frame for Long Term Goals : 3 weeks  Long Term Goal 1: Patient will complete supine < > sit with modified independence to transfer in and out of bed safely.  Long Term Goal 2: Patient will complete sit < > stand with modified independence to stand to ambulate safely.  Long Term Goal 3: Patient will complete bed < > chair transfer with modified independence with a RW to transfer surface to surface safely.  Long Term Goal 4: Patient will ambulate 150' with a 4WW and modified independence to navigate home safely.  Long Term Goal 5: Patient will ascend/descend 2 steps with hand held assistance and CGA to access/leave home safely.  Additional Goals?: Yes  Long term goal 6: Patient will complete car transfer with modified independence to transfer in and out of vehicle safely.  Long term goal 7: Patient will improave TUG to less than or equal to 14 sec. to reduce her risk for falls.    Following session, patient left in safe position with all fall risk precautions in place.

## 2024-01-03 NOTE — PROGRESS NOTES
[facility]   Nephrology progress note  1/3/2024, 10:48 AM      Pt Name:    Deloris Watts  MRN:     [id number]     YOB: 1955  Admit Date:    12/21/2023  1:13 PM    Chief Complaint: Nephrology following for ESRD and hemodialysis    Subjective:  Patient was seen and examined this morning  No chest pain or shortness of breath  Rehab going on well no other complaints    Objective:  24HR INTAKE/OUTPUT:    Intake/Output Summary (Last 24 hours) at 1/3/2024 1048  Last data filed at 1/3/2024 1004  Gross per 24 hour   Intake 120 ml   Output --   Net 120 ml        Admission weight: 47 kg (103 lb 9.9 oz)  Wt Readings from Last 3 Encounters:   01/03/24 45.9 kg (101 lb 4.8 oz)   12/20/23 47 kg (103 lb 9.9 oz)   12/15/23 45.4 kg (100 lb)        Vitals :   Vitals:    01/03/24 0615 01/03/24 1000 01/03/24 1014 01/03/24 1015   BP:    (!) 148/88   Pulse:    84   Resp:   20 18   Temp:    97.5 °F (36.4 °C)   TempSrc:  Oral  Oral   SpO2:    100%   Weight: 45.9 kg (101 lb 4.8 oz)      Height:           Physical examination  General Appearance: Awake and alert no no distress  Mouth/Throat: Oral mucosa moist  Neck: Accessory muscle  Lungs: Clear  Heart:  S1, S2 heard  GI: soft, non-tender, no guarding  Extremities no edema    Medications:  Infusion:    sodium chloride       Meds:    diclofenac sodium  2 g Topical 4x Daily    traZODone  50 mg Oral Nightly    sevelamer  800 mg Oral TID WC    senna  2 tablet Oral Nightly    polyethylene glycol  17 g Oral BID    montelukast  10 mg Oral Daily    folic acid  500 mcg Oral Daily    fluticasone  2 spray Each Nostril Daily    famotidine  10 mg Oral Daily    docusate sodium  1 enema Rectal Daily    docusate sodium  100 mg Oral BID    cetirizine  5 mg Oral Daily    calcitRIOL  0.25 mcg Oral Daily    atorvastatin  10 mg Oral Nightly    amLODIPine  5 mg Oral Daily    linaclotide  290 mcg Oral QAM AC    melatonin  6 mg Oral Nightly    clopidogrel  75 mg Oral Daily

## 2024-01-03 NOTE — PLAN OF CARE
Problem: Discharge Planning  Goal: Discharge to home or other facility with appropriate resources  1/3/2024 0117 by Karime Browning LPN  Outcome: Progressing  Flowsheets (Taken 1/2/2024 1329 by Lupe Thakur, RN)  Discharge to home or other facility with appropriate resources:   Identify barriers to discharge with patient and caregiver   Arrange for needed discharge resources and transportation as appropriate   Identify discharge learning needs (meds, wound care, etc)   Refer to discharge planning if patient needs post-hospital services based on physician order or complex needs related to functional status, cognitive ability or social support system  Note: Patient continues to progress with therapy. Social service working on discharge needs. Patient plans to discharge 1/5/2024 with outpatient PT at [facility].       Problem: Safety - Adult  Goal: Free from fall injury  1/3/2024 0117 by Karime Browning LPN  Outcome: Progressing  Flowsheets (Taken 1/2/2024 1329 by Lupe Thakur, RN)  Free From Fall Injury: Instruct family/caregiver on patient safety  Note: No falls sustained at this time. Patient alert to call light and uses appropriately to alert staff of needs. Bed/chair alarm in use.        Problem: Skin/Tissue Integrity  Goal: Absence of new skin breakdown  Description: 1.  Monitor for areas of redness and/or skin breakdown  2.  Assess vascular access sites hourly  3.  Every 4-6 hours minimum:  Change oxygen saturation probe site  4.  Every 4-6 hours:  If on nasal continuous positive airway pressure, respiratory therapy assess nares and determine need for appliance change or resting period.  1/3/2024 0117 by Karime Browning LPN  Outcome: Progressing  Note: No new skin issues noted this shift.       Care plan reviewed with patient. Patient verbalizes understanding of the plan of care and contributes toward goal progression.

## 2024-01-03 NOTE — PROGRESS NOTES
[facility]  Recreational Therapy  Daily Note  [facility]    Time Spent with Patient: 0 minutes    Date:  1/3/2024       Patient Name: Deloris Watts      MRN: [id number]      YOB: 1955 (68 y.o.)       Gender: female  Diagnosis: traumatic compression fracture of L2 lumbar vertebra, closed initial encounter  Referring Practitioner: ordering:  Holly Whitaker APRN - CNP, attending: Jax Whatley MD    RESTRICTIONS/PRECAUTIONS:  Restrictions/Precautions: Fall Risk, General Precautions     Hearing: Within functional limits    PAIN: 0    SUBJECTIVE:  I would like that     OBJECTIVE:  Pt would like to get her hair washed and styled by our beautician tomorrow-appreciative         Patient Education  New Education Provided: Importance of Leisure,     Electronically signed by: Elena Tan CTRS  Date: 1/3/2024

## 2024-01-04 LAB
ANION GAP SERPL CALC-SCNC: 13 MEQ/L (ref 8–16)
BUN SERPL-MCNC: 34 MG/DL (ref 7–22)
CALCIUM SERPL-MCNC: 10.2 MG/DL (ref 8.5–10.5)
CHLORIDE SERPL-SCNC: 101 MEQ/L (ref 98–111)
CO2 SERPL-SCNC: 23 MEQ/L (ref 23–33)
CREAT SERPL-MCNC: 4.5 MG/DL (ref 0.4–1.2)
GFR SERPL CREATININE-BSD FRML MDRD: 10 ML/MIN/1.73M2
GLUCOSE SERPL-MCNC: 102 MG/DL (ref 70–108)
POTASSIUM SERPL-SCNC: 5.4 MEQ/L (ref 3.5–5.2)
SODIUM SERPL-SCNC: 137 MEQ/L (ref 135–145)

## 2024-01-04 PROCEDURE — 97530 THERAPEUTIC ACTIVITIES: CPT

## 2024-01-04 PROCEDURE — 97535 SELF CARE MNGMENT TRAINING: CPT

## 2024-01-04 PROCEDURE — 97116 GAIT TRAINING THERAPY: CPT

## 2024-01-04 PROCEDURE — 99232 SBSQ HOSP IP/OBS MODERATE 35: CPT | Performed by: PHYSICAL MEDICINE & REHABILITATION

## 2024-01-04 PROCEDURE — 6370000000 HC RX 637 (ALT 250 FOR IP): Performed by: PHYSICAL MEDICINE & REHABILITATION

## 2024-01-04 PROCEDURE — 97129 THER IVNTJ 1ST 15 MIN: CPT | Performed by: SPEECH-LANGUAGE PATHOLOGIST

## 2024-01-04 PROCEDURE — 36415 COLL VENOUS BLD VENIPUNCTURE: CPT

## 2024-01-04 PROCEDURE — 97130 THER IVNTJ EA ADDL 15 MIN: CPT | Performed by: SPEECH-LANGUAGE PATHOLOGIST

## 2024-01-04 PROCEDURE — 1180000000 HC REHAB R&B

## 2024-01-04 PROCEDURE — 99232 SBSQ HOSP IP/OBS MODERATE 35: CPT | Performed by: INTERNAL MEDICINE

## 2024-01-04 PROCEDURE — 6370000000 HC RX 637 (ALT 250 FOR IP): Performed by: INTERNAL MEDICINE

## 2024-01-04 PROCEDURE — 6370000000 HC RX 637 (ALT 250 FOR IP): Performed by: NURSE PRACTITIONER

## 2024-01-04 PROCEDURE — 97110 THERAPEUTIC EXERCISES: CPT

## 2024-01-04 PROCEDURE — 80048 BASIC METABOLIC PNL TOTAL CA: CPT

## 2024-01-04 RX ORDER — FLUTICASONE PROPIONATE 50 MCG
2 SPRAY, SUSPENSION (ML) NASAL DAILY
Qty: 16 G | Refills: 3 | Status: SHIPPED | OUTPATIENT
Start: 2024-01-05

## 2024-01-04 RX ORDER — CYCLOBENZAPRINE HCL 10 MG
10 TABLET ORAL 3 TIMES DAILY PRN
Qty: 90 TABLET | Refills: 1 | Status: SHIPPED | OUTPATIENT
Start: 2024-01-04

## 2024-01-04 RX ORDER — LANOLIN ALCOHOL/MO/W.PET/CERES
6 CREAM (GRAM) TOPICAL NIGHTLY
Qty: 60 TABLET | Refills: 3 | Status: SHIPPED | OUTPATIENT
Start: 2024-01-04

## 2024-01-04 RX ORDER — TRAMADOL HYDROCHLORIDE 50 MG/1
25-50 TABLET ORAL EVERY 6 HOURS PRN
Qty: 28 TABLET | Refills: 0 | Status: SHIPPED | OUTPATIENT
Start: 2024-01-04 | End: 2024-01-11

## 2024-01-04 RX ORDER — FAMOTIDINE 10 MG
10 TABLET ORAL DAILY
Qty: 60 TABLET | Refills: 3 | Status: SHIPPED | OUTPATIENT
Start: 2024-01-05

## 2024-01-04 RX ADMIN — TRAZODONE HYDROCHLORIDE 50 MG: 50 TABLET ORAL at 23:46

## 2024-01-04 RX ADMIN — ACETAMINOPHEN 650 MG: 325 TABLET ORAL at 08:17

## 2024-01-04 RX ADMIN — SEVELAMER CARBONATE 800 MG: 800 TABLET, FILM COATED ORAL at 18:06

## 2024-01-04 RX ADMIN — Medication 6 MG: at 23:46

## 2024-01-04 RX ADMIN — AMLODIPINE BESYLATE 5 MG: 5 TABLET ORAL at 08:16

## 2024-01-04 RX ADMIN — FOLIC ACID 500 MCG: 1 TABLET ORAL at 08:16

## 2024-01-04 RX ADMIN — SODIUM ZIRCONIUM CYCLOSILICATE 10 G: 10 POWDER, FOR SUSPENSION ORAL at 13:04

## 2024-01-04 RX ADMIN — SEVELAMER CARBONATE 800 MG: 800 TABLET, FILM COATED ORAL at 13:04

## 2024-01-04 RX ADMIN — CLOPIDOGREL BISULFATE 75 MG: 75 TABLET ORAL at 08:17

## 2024-01-04 RX ADMIN — CETIRIZINE HYDROCHLORIDE 5 MG: 5 TABLET ORAL at 08:16

## 2024-01-04 RX ADMIN — FAMOTIDINE 10 MG: 20 TABLET ORAL at 08:16

## 2024-01-04 RX ADMIN — ATORVASTATIN CALCIUM 10 MG: 10 TABLET, FILM COATED ORAL at 23:47

## 2024-01-04 RX ADMIN — CALCITRIOL CAPSULES 0.25 MCG 0.25 MCG: 0.25 CAPSULE ORAL at 08:16

## 2024-01-04 RX ADMIN — MONTELUKAST SODIUM 10 MG: 10 TABLET ORAL at 08:16

## 2024-01-04 RX ADMIN — SEVELAMER CARBONATE 800 MG: 800 TABLET, FILM COATED ORAL at 08:16

## 2024-01-04 RX ADMIN — FLUTICASONE PROPIONATE 2 SPRAY: 50 SPRAY, METERED NASAL at 08:21

## 2024-01-04 ASSESSMENT — PAIN SCALES - GENERAL
PAINLEVEL_OUTOF10: 0
PAINLEVEL_OUTOF10: 2

## 2024-01-04 ASSESSMENT — PAIN DESCRIPTION - ORIENTATION: ORIENTATION: MID

## 2024-01-04 ASSESSMENT — PAIN DESCRIPTION - DESCRIPTORS: DESCRIPTORS: ACHING

## 2024-01-04 ASSESSMENT — PAIN DESCRIPTION - LOCATION: LOCATION: BACK

## 2024-01-04 NOTE — PLAN OF CARE
evaluate response   Consider cultural and social influences on pain and pain management   Notify Licensed Independent Practitioner if interventions unsuccessful or patient reports new pain  1/4/2024 0022 by Juan Key RN  Outcome: Progressing  Flowsheets (Taken 1/3/2024 2117)  Verbalizes/displays adequate comfort level or baseline comfort level: Encourage patient to monitor pain and request assistance     Problem: Chronic Conditions and Co-morbidities  Goal: Patient's chronic conditions and co-morbidity symptoms are monitored and maintained or improved  1/4/2024 1055 by Lo Carrington RN  Outcome: Progressing  Flowsheets (Taken 1/4/2024 0800)  Care Plan - Patient's Chronic Conditions and Co-Morbidity Symptoms are Monitored and Maintained or Improved:   Monitor and assess patient's chronic conditions and comorbid symptoms for stability, deterioration, or improvement   Collaborate with multidisciplinary team to address chronic and comorbid conditions and prevent exacerbation or deterioration   Update acute care plan with appropriate goals if chronic or comorbid symptoms are exacerbated and prevent overall improvement and discharge  1/4/2024 0022 by Juan Key RN  Outcome: Progressing  Flowsheets (Taken 1/3/2024 2117)  Care Plan - Patient's Chronic Conditions and Co-Morbidity Symptoms are Monitored and Maintained or Improved: Monitor and assess patient's chronic conditions and comorbid symptoms for stability, deterioration, or improvement     Problem: Musculoskeletal - Adult  Goal: Return ADL status to a safe level of function  1/4/2024 1055 by Lo Carrington RN  Outcome: Progressing  Flowsheets (Taken 1/4/2024 0800)  Return ADL Status to a Safe Level of Function:   Administer medication as ordered   Assess activities of daily living deficits and provide assistive devices as needed   Obtain physical therapy/occupational therapy consults as needed   Assist and instruct patient to increase activity and self  care as tolerated  1/4/2024 0022 by Juan Key, RN  Outcome: Progressing  Flowsheets (Taken 1/3/2024 2117)  Return ADL Status to a Safe Level of Function: Administer medication as ordered     Problem: Genitourinary - Adult  Goal: Absence of urinary retention  Recent Flowsheet Documentation  Taken 1/4/2024 0800 by Lo Carrington RN  Absence of urinary retention:   Assess patient’s ability to void and empty bladder   Monitor intake/output and perform bladder scan as needed     Problem: Metabolic/Fluid and Electrolytes - Adult  Goal: Electrolytes maintained within normal limits  Outcome: Progressing  Flowsheets (Taken 1/4/2024 0800)  Electrolytes maintained within normal limits:   Monitor labs and assess patient for signs and symptoms of electrolyte imbalances   Administer electrolyte replacement as ordered   Monitor response to electrolyte replacements, including repeat lab results as appropriate  Goal: Hemodynamic stability and optimal renal function maintained  1/4/2024 1055 by Lo Carrington RN  Outcome: Progressing  Flowsheets (Taken 1/4/2024 0800)  Hemodynamic stability and optimal renal function maintained:   Monitor labs and assess for signs and symptoms of volume excess or deficit   Monitor intake, output and patient weight  1/4/2024 0022 by Juan Key, RN  Outcome: Progressing  Flowsheets (Taken 1/3/2024 2117)  Hemodynamic stability and optimal renal function maintained: Monitor labs and assess for signs and symptoms of volume excess or deficit     Problem: Hematologic - Adult  Goal: Maintains hematologic stability  1/4/2024 1055 by Lo Carrington RN  Outcome: Progressing  Flowsheets (Taken 1/4/2024 0800)  Maintains hematologic stability: Assess for signs and symptoms of bleeding or hemorrhage  1/4/2024 0022 by Juan Key RN  Outcome: Progressing  Flowsheets (Taken 1/3/2024 2117)  Maintains hematologic stability: Assess for signs and symptoms of bleeding or hemorrhage     Problem:

## 2024-01-04 NOTE — PROGRESS NOTES
[facility]  INPATIENT SPEECH THERAPY  [facility]  DAILY NOTE    TIME   SLP Individual Minutes  Time In: 0730  Time Out: 0800  Minutes: 30  Timed Code Treatment Minutes: 30 Minutes       Date: 2024  Patient Name: Deloris Watts      CSN: [id number]   : 1955  (68 y.o.)  Gender: female   Referring Physician:  Holly Whitaker APRN - CNP  Diagnosis: Traumatic compression fracture of L2 lumbar vertebra, closed, initial encounter  Precautions: fall risk  Current Diet: Regular Diet with Thin Liquids  Respiratory Status: Room Air  Swallowing Strategies: Standard Universal Swallow Precautions  Date of Last MBS/FEES: Not Applicable    Pain:  No pain reported.    Subjective: Patient resting in bed upon SLP arrival; spontaneous wakefulness achieved with verbal stimuli. Pleasant and cooperative for duration of session.     Short-Term Goals:  SHORT TERM GOAL #1:  Goal 1: Patient will complete immediate/delayed recall tasks with 80% accuracy at YOSHI level, with or without use of compensatory strategies, to improve retention of novel and newly presented information.  INTERVENTIONS:     Delayed Recall of Hospital-Wide Navigation (~24 hour delay)   Locations: 4/5 indep, 1/5 with min cues   Information: 3/5 indep, 2/5 with min cues  *No independent initiation of strategy implementation    SHORT TERM GOAL #2:  Goal 2: Patient will complete problem solving, visual/verbal reasoning, executive functioning tasks with 80% given min cues to improve return to IADLs/ADLs.  INTERVENTIONS:     Problem solving scenarios- Pictured scenes:  -ID of problem:  indep,  with min cues  -Provision of solution: indep,  with min cues  *Overall appropriate safety awareness and ability to determine logical solution for correcting problems.     SHORT TERM GOAL #3:  Goal 3: Patient will complete sequencing and thought organization tasks with 80% accuracy given min cues to improve mental

## 2024-01-04 NOTE — PROGRESS NOTES
Recommendations of the team were explained to the patient  by Dr. Whatley and Precious FONSECA.  Team is recommending that patient continue on acute inpatient rehab for 3 more days, with expected discharge date of  1/5/2024.  Following discharge, team is recommending outpatient therapy PT, OT, ST and supervision and family training on day of discharge.  Care plan reviewed with patient.  Patient verbalized understanding of the plan of care and contributed to goal setting.  SW will follow and maintain involvement in discharge planning.

## 2024-01-04 NOTE — PROGRESS NOTES
[facility]  Diagnosis List for Inpatient Rehab facility (IRF) - Patient Assessment Instrument (DONALD)    Patient Name: Deloris Watts        MRN: [id number]    : 1955  (68 y.o.)  Gender: female     Primary impairment requiring rehabilitation: 8.9 Other Orthopedic      Etiologic Diagnosis that led to the condition: acute L2 inferior endplate pathological (due to osteoporosis) compression fracture    Comorbid conditions affecting rehabilitation:  Status post fall resulting  Lumbar spine contusion resulting lumbar spine sprain and muscular strain with acute L2 inferior endplate pathological (due to osteoporosis) compression fracture requiring kyphoplasty  Liver laceration/contusion  History of stroke with left hemiparesis  End-stage renal disease requiring hemodialysis  Nonfunctioning hemodialysis catheter   Hyperkalemia  Hypertension  Irritable bowel syndrome  Hyperlipidemia  Osteoporosis  History of hydronephrosis  History of fibromyalgia  History of depression and anxiety  History of right proximal humerus fracture requiring ORIF  History of right wrist fracture requiring casting  History of right ankle fracture requiring casting    ANTON DAVIS MD

## 2024-01-04 NOTE — PROGRESS NOTES
Tested    Transfers:  Sit to Stand:Modified Independent  Stand to Sit:Modified Independent  Car:Modified Independent    Ambulation:  Modified Independent  Distance: 250' x 2, 50' x 2, multiple shorter distances.   Surface: Level Tile  Device: 4 Wheeled Walker  Gait Deviations:  Slow Anna, Decreased Step Length Bilaterally, Decreased Gait Speed, Decreased Heel Strike Bilaterally, and Decreased Terminal Knee Extension    Stairs:  Platform: 6\" platform X 1 using 4 Wheeled Walker and Modified Independent.    Balance:  Pt. Completed standing dynamic balance activity: ring toss on foam surface and balloon volleyball on level tile with Normal DENNIS and Intermittent UE support on 4 Wheeled Walker with Stand By Assistance. Activity completed to improve balance, enhance functional mobility, and reduce risk of falls.     Neuromuscular Re-education  None    Exercise:  Patient was guided in 1 set(s) 10 reps of exercises: Glut sets, Seated marches, Seated hamstring curls, Seated heel/toe raises, Long arc quads, Seated isometric hip adduction, Seated abduction/adduction, and abdominal isometrics. HEP handout provided.  Exercises were completed for increased independence with functional mobility.    Functional Outcome Measures:   Completed.    Timed Up and Go (TUG)  Current Score: 18.05 Seconds (Date: 2024)    Interpretation of Score: This tests the patient’s mobility and fall risk. Less than 10 seconds = freely mobile; <20 seconds = mostly independent; 20-29 seconds = variable mobility; > 20 seconds = impaired mobility; > 30 seconds = very high fall risk. Additional scorin.1 seconds in vestibular patients = increased fall risk; > 13.5 seconds in elderly = Increased fall risk)  Modified Hudson Scale:  Not Applicable     ASSESSMENT:  Assessment: Patient progressing toward established goals.  Activity Tolerance:  Patient tolerance of  treatment: good.     Equipment Recommendations:Equipment Needed: No (has RW, rollator,  wheelchair and transport chair)  Discharge Recommendations: Continue to assess pending progress, Patient would benefit from continued therapy after discharge     PLAN: Current Treatment Recommendations: Strengthening, Balance training, Functional mobility training, Transfer training, Endurance training, Wheelchair mobility training, Gait training, Stair training, Neuromuscular re-education, Home exercise program, Safety education & training, Patient/Caregiver education & training, Therapeutic activities  General Plan:  (5x/wk 90min)    Patient Education  Patient Education: Plan of Care, Functional Mobility, Reviewed Prior Education, Health Promotion and Wellness Education, Safety, Verbal Exercise Instruction    Goals:  Patient Goals : \"Be able to walk like I was with my walker\"  Short Term Goals  Time Frame for Short Term Goals: 1 week  Short Term Goal 1: Patient will complete supine < > sit with head of bed flat and no rails with supervision to transfer in and out of bed with decreased difficulty.  Short Term Goal 2: Patient will complete sit < > stand with SBA and less than or equal to 25% verbal cues for hand placement to stand to ambulate with increased safety.  Short Term Goal 3: Patient will ambulate 50' with a RW and SBA to progress towards navigating home safely. GOAL MET, SEE LTG  Short Term Goal 4: Patient will ascend/descend 2 steps with 1 handrail and CGA to access leave home.  Short Term Goal 5: Patient will improve tinetti to greater than or equal to 19/28 to reduce her risk for falls.  Long Term Goals  Time Frame for Long Term Goals : 3 weeks  Long Term Goal 1: Patient will complete supine < > sit with modified independence to transfer in and out of bed safely.  Long Term Goal 2: Patient will complete sit < > stand with modified independence to stand to ambulate safely.  Long Term Goal 3: Patient will complete bed < > chair transfer with modified independence with a RW to transfer surface to surface

## 2024-01-04 NOTE — PROGRESS NOTES
Patient educated on how to use incentive spirometer. Patient verbalized understanding and demonstrated proper use. Emphasized importance and usage of device, with coughing and deep breathing every 2 hours while awake.

## 2024-01-04 NOTE — PROGRESS NOTES
[facility]  Recreational Therapy  Daily Note  [facility]    Time Spent with Patient: 30 minutes    Date:  1/4/2024       Patient Name: Deloris Watts      MRN: [id number]      YOB: 1955 (68 y.o.)       Gender: female  Diagnosis: traumatic compression fracture of L2 lumbar vertebra, closed initial encounter  Referring Practitioner: ordering:  Holly Whitaker APRN - CNP, attending: Jax Whatley MD    RESTRICTIONS/PRECAUTIONS:  Restrictions/Precautions: Fall Risk, General Precautions     Hearing: Within functional limits    PAIN: 0    SUBJECTIVE:  I am glad to get this done before I go home tomorrow     OBJECTIVE:  Pt enjoyed getting her hair washed and styled by our beautician-she talked about her family and her  and going home tomorrow -affect bright and social        Patient Education  New Education Provided: Importance of Leisure,     Electronically signed by: Elena Tan CTRS  Date: 1/4/2024

## 2024-01-04 NOTE — PROGRESS NOTES
[facility]  INPATIENT PHYSICAL THERAPY  [facility] - 8K-02/002-A  Daily Note    Time In: 1030  Time Out: 1100  Timed Code Treatment Minutes: 30 Minutes  Minutes: 30          Date: 2024  Patient Name: Deloris Watts,  Gender:  female        MRN: [id number]  : 1955  (68 y.o.)  Referral Date : 23  Referring Practitioner: Holly Whitaker APRN - CNP  Diagnosis: Traumatic compression fracture of L2 lumbar vertebra, closed, initial encounte  Treatment Diagnosis: difficulty in walking  Additional Pertinent Hx: Per H&P:Deloris Watts is a 68 y.o. right-handed  female with history of hypertension, hyperlipidemia, hydronephrosis, fibromyalgia, irritable bowel syndrome, osteoporosis, end-stage renal disease on hemodialysis since 2023, stroke with left side weakness in , anemia, depression, anxiety, right wrist and right ankle fracture treated conservatively, right proximal humeral fracture due to fall requiring ORIF, status post right carpal tunnel release surgery, status post cervical spine surgery, status post hysterectomy and bilateral oophorectomy, hemorrhoidectomy, sinus surgery, tubal ligation, LASEK surgery, is admitted to the inpatient rehabilitation unit on 2023 for intensive inpatient rehabilitation treatment of impairment and disability secondary to fall accident resulting L2 compression fracture requiring kyphoplasty, and liver laceration.She was found to have new L2 inferior endplate fracture, and hepatic laceration/contusion.  Nephrology was consulted to continue her hemodialysis.  MRI of thoracic and lumbar spine were ordered and performed on 12/15/2023.  Lumbar spine MRI done on 12/15/2023 confirmed the presence of acute L2 20% inferior endplate compression fracture without displacement of the fragment.  Thoracic spine MRI revealed no fracture or disc protrusion, and normal spinal cord. Neurosurgery was consulted on  Goals: 1 week  Short Term Goal 1: Patient will complete supine < > sit with head of bed flat and no rails with supervision to transfer in and out of bed with decreased difficulty.  Short Term Goal 2: Patient will complete sit < > stand with SBA and less than or equal to 25% verbal cues for hand placement to stand to ambulate with increased safety.  Short Term Goal 3: Patient will ambulate 50' with a RW and SBA to progress towards navigating home safely. GOAL MET, SEE LTG  Short Term Goal 4: Patient will ascend/descend 2 steps with 1 handrail and CGA to access leave home.  Short Term Goal 5: Patient will improve tinetti to greater than or equal to 19/28 to reduce her risk for falls.  Long Term Goals  Time Frame for Long Term Goals : 3 weeks  Long Term Goal 1: Patient will complete supine < > sit with modified independence to transfer in and out of bed safely.  Long Term Goal 2: Patient will complete sit < > stand with modified independence to stand to ambulate safely.  Long Term Goal 3: Patient will complete bed < > chair transfer with modified independence with a RW to transfer surface to surface safely.  Long Term Goal 4: Patient will ambulate 150' with a 4WW and modified independence to navigate home safely.  Long Term Goal 5: Patient will ascend/descend 2 steps with hand held assistance and CGA to access/leave home safely.  Additional Goals?: Yes  Long term goal 6: Patient will complete car transfer with modified independence to transfer in and out of vehicle safely.  Long term goal 7: Patient will improave TUG to less than or equal to 14 sec. to reduce her risk for falls.    Following session, patient left in safe position with all fall risk precautions in place.

## 2024-01-04 NOTE — PLAN OF CARE
Problem: Discharge Planning  Goal: Discharge to home or other facility with appropriate resources  1/4/2024 1202 by Kusum Guajardo LISW  Note: Transportation:   Has transportation kept you from medical appointments, meetings, work, or from getting things needed for daily living? (Check all that apply)  No.      Health Literacy:   How often do you need to have someone help you when you read instructions, pamphlets, or other written material from your doctor or pharmacy?  0. - Never    Social Isolation:  How often do you feel lonely or isolated from those around you?  0. Never      Patient Mood Interview (PHQ-2 to 9) (from [employer]©)   Say to Patient: \"Over the last 2 weeks, have you been bothered by any of the following problems?\"   If symptom is present, enter 1 (yes) in column 1 (Symptom Presence)  If yes in column 1, then ask the patient: “About how often have you been bothered by this?”  Read and show the patient a card with the symptom frequency choices.    Indicate response in column 2, Symptom Frequency.   Symptom Presence  No (enter 0 in column 2)   Yes (enter 0-3 in column 2)  9.    No response (leave column 2 blank) Symptom Frequency  Never or 1 day  2-6 days (several days)  7-11 days (half or more of the days)  12-14 days (nearly every day    Symptom Presence Symptom Frequency   Little interest or pleasure in doing things 0. No 0. Never or 1 day   Feeling down, depressed, or hopeless 0. No 0. Never or 1 day

## 2024-01-04 NOTE — PROGRESS NOTES
[facility]   Nephrology progress note  1/4/2024, 10:54 AM      Pt Name:    Deloris Watts  MRN:     [id number]     YOB: 1955  Admit Date:    12/21/2023  1:13 PM    Chief Complaint: Nephrology following for ESRD and hemodialysis    Subjective:  Patient was seen and examined this morning  No chest pain or shortness of breath  Rehab going on well no other complaints  Had some cramping yesterday    Objective:  24HR INTAKE/OUTPUT:    Intake/Output Summary (Last 24 hours) at 1/4/2024 1054  Last data filed at 1/4/2024 0939  Gross per 24 hour   Intake 100 ml   Output 1000 ml   Net -900 ml        Admission weight: 47 kg (103 lb 9.9 oz)  Wt Readings from Last 3 Encounters:   01/04/24 47.1 kg (103 lb 13.4 oz)   12/20/23 47 kg (103 lb 9.9 oz)   12/15/23 45.4 kg (100 lb)        Vitals :   Vitals:    01/03/24 1440 01/03/24 2117 01/04/24 0703 01/04/24 0800   BP: 139/64 (!) 160/71  (!) 146/73   Pulse: 69 77  68   Resp: 18 18  16   Temp: 97.7 °F (36.5 °C) 98.2 °F (36.8 °C)  97.5 °F (36.4 °C)   TempSrc: Oral Oral  Oral   SpO2: 100% 100%  96%   Weight:   47.1 kg (103 lb 13.4 oz)    Height:           Physical examination  General Appearance: Awake and alert no no distress  Mouth/Throat: Oral mucosa moist  Neck: Accessory muscle  Lungs: Clear  Heart:  S1, S2 heard  GI: soft, non-tender, no guarding  Extremities no edema    Medications:  Infusion:    sodium chloride       Meds:    diclofenac sodium  2 g Topical 4x Daily    traZODone  50 mg Oral Nightly    sevelamer  800 mg Oral TID WC    senna  2 tablet Oral Nightly    polyethylene glycol  17 g Oral BID    montelukast  10 mg Oral Daily    folic acid  500 mcg Oral Daily    fluticasone  2 spray Each Nostril Daily    famotidine  10 mg Oral Daily    docusate sodium  1 enema Rectal Daily    docusate sodium  100 mg Oral BID    cetirizine  5 mg Oral Daily    calcitRIOL  0.25 mcg Oral Daily    atorvastatin  10 mg Oral Nightly    amLODIPine  5 mg Oral Daily

## 2024-01-04 NOTE — PROGRESS NOTES
Patient: Deloris Watts  Unit/Bed: 8K-02/002-A  YOB: 1955  MRN: [id number] Acct: [id number]   Admitting Diagnosis: Traumatic compression fracture of L2 lumbar vertebra, closed, initial encounter ([facility]) [S32.020A]  Admit Date:  12/21/2023  Hospital Day: 13    This patient was seen earlier in the day.    Assessment:     Principal Problem:    Traumatic compression fracture of L2 lumbar vertebra, closed, initial encounter ([facility])  Active Problems:    Moderate malnutrition ([facility])    History of CVA (cerebrovascular accident)    Allergic rhinitis    Essential hypertension    Age related osteoporosis    ESRD (end stage renal disease) ([facility])    Hyperkalemia    Anemia, chronic disease    Secondary hyperparathyroidism of renal origin ([facility])    Liver injury, laceration    Closed compression fracture of L2 lumbar vertebra, initial encounter ([facility])  Resolved Problems:    * No resolved hospital problems. *      Plan:     Continue to follow        Subjective:     Patient has no complaint of CP or SOB..   Medication side effects: none    Scheduled Meds:   diclofenac sodium  2 g Topical 4x Daily    traZODone  50 mg Oral Nightly    sevelamer  800 mg Oral TID WC    senna  2 tablet Oral Nightly    polyethylene glycol  17 g Oral BID    montelukast  10 mg Oral Daily    folic acid  500 mcg Oral Daily    fluticasone  2 spray Each Nostril Daily    famotidine  10 mg Oral Daily    docusate sodium  1 enema Rectal Daily    docusate sodium  100 mg Oral BID    cetirizine  5 mg Oral Daily    calcitRIOL  0.25 mcg Oral Daily    atorvastatin  10 mg Oral Nightly    amLODIPine  5 mg Oral Daily    linaclotide  290 mcg Oral QAM AC    melatonin  6 mg Oral Nightly    clopidogrel  75 mg Oral Daily     Continuous Infusions:   sodium chloride       PRN Meds:traMADol, sodium chloride flush, phenol, ondansetron, cyclobenzaprine, sodium chloride, acetaminophen, traZODone, bisacodyl, diclofenac sodium    Review of Systems  Pertinent items are noted in  HPI.    Objective:     Patient Vitals for the past 8 hrs:   BP Temp Temp src Pulse Resp SpO2 Weight   01/03/24 2117 (!) 160/71 98.2 °F (36.8 °C) Oral 77 18 100 % --   01/03/24 1440 139/64 97.7 °F (36.5 °C) Oral 69 18 100 % --   01/03/24 1430 (!) 144/68 97.6 °F (36.4 °C) -- 76 21 100 % 45.1 kg (99 lb 6.8 oz)     I/O last 3 completed shifts:  In: 240 [P.O.:240]  Out: 1000   No intake/output data recorded.    BP (!) 160/71   Pulse 77   Temp 98.2 °F (36.8 °C) (Oral)   Resp 18   Ht 1.524 m (5')   Wt 45.1 kg (99 lb 6.8 oz)   SpO2 100%   BMI 19.42 kg/m²     General appearance: alert, appears stated age, and cooperative  Head: Normocephalic, without obvious abnormality, atraumatic  Lungs: clear to auscultation bilaterally  Heart: regular rate and rhythm, S1, S2 normal, no murmur, click, rub or gallop  Abdomen: soft, non-tender; bowel sounds normal; no masses,  no organomegaly  Extremities: extremities normal, atraumatic, no cyanosis or edema  Skin: Skin color, texture, turgor normal. No rashes or lesions  Neurologic:  weak    Electronically signed by Gareth Mosquera MD on 1/3/2024 at 10:08 PM

## 2024-01-04 NOTE — PLAN OF CARE
Problem: Discharge Planning  Goal: Discharge to home or other facility with appropriate resources  1/4/2024 1203 by Kusum Guajardo LISW  Note: VARSHA spoke with Lo at the [facility] to set up outpatient PT, OT and ST. Lo reports needing updated orders, and they will contact patient to schedule initial appointments. SW faxed over orders on this date.    SW met with patient to go over discharge planning. Patient is in agreement with referral to the [facility]. Patient reports her spouse will be in this afternoon to go over training with therapy. Patient has no outstanding needs.    SW will follow and maintain involvement in discharge planning.

## 2024-01-04 NOTE — PLAN OF CARE
Problem: Discharge Planning  Goal: Discharge to home or other facility with appropriate resources  1/4/2024 0022 by Juan Key RN  Outcome: Progressing  Flowsheets (Taken 1/3/2024 2117)  Discharge to home or other facility with appropriate resources: Identify barriers to discharge with patient and caregiver  1/3/2024 1230 by Lupe Thakur RN  Outcome: Progressing  Flowsheets (Taken 1/3/2024 1230)  Discharge to home or other facility with appropriate resources:   Identify barriers to discharge with patient and caregiver   Arrange for needed discharge resources and transportation as appropriate   Identify discharge learning needs (meds, wound care, etc)   Refer to discharge planning if patient needs post-hospital services based on physician order or complex needs related to functional status, cognitive ability or social support system     Problem: Safety - Adult  Goal: Free from fall injury  1/4/2024 0022 by Juan Key RN  Outcome: Progressing  Flowsheets (Taken 1/4/2024 0022)  Free From Fall Injury: Instruct family/caregiver on patient safety  1/3/2024 1230 by Lupe Thakur RN  Outcome: Progressing  Flowsheets (Taken 1/3/2024 1230)  Free From Fall Injury: Instruct family/caregiver on patient safety     Problem: ABCDS Injury Assessment  Goal: Absence of physical injury  1/4/2024 0022 by Juan Key RN  Outcome: Progressing  Flowsheets (Taken 1/4/2024 0022)  Absence of Physical Injury: Implement safety measures based on patient assessment  1/3/2024 1230 by Lupe Thakur RN  Outcome: Progressing  Flowsheets (Taken 1/3/2024 1230)  Absence of Physical Injury: Implement safety measures based on patient assessment     Problem: Pain  Goal: Verbalizes/displays adequate comfort level or baseline comfort level  1/4/2024 0022 by Juan Key RN  Outcome: Progressing  Flowsheets (Taken 1/3/2024 2117)  Verbalizes/displays adequate comfort level or baseline comfort level: Encourage patient to monitor pain  Metabolic/Fluid and Electrolytes - Adult  Goal: Hemodynamic stability and optimal renal function maintained  1/4/2024 0022 by Juan Key RN  Outcome: Progressing  Flowsheets (Taken 1/3/2024 2117)  Hemodynamic stability and optimal renal function maintained: Monitor labs and assess for signs and symptoms of volume excess or deficit  1/3/2024 1230 by Lupe Thakur RN  Outcome: Progressing  Flowsheets (Taken 1/3/2024 1230)  Hemodynamic stability and optimal renal function maintained:   Monitor labs and assess for signs and symptoms of volume excess or deficit   Monitor intake, output and patient weight     Problem: Hematologic - Adult  Goal: Maintains hematologic stability  1/4/2024 0022 by Juan Key RN  Outcome: Progressing  Flowsheets (Taken 1/3/2024 2117)  Maintains hematologic stability: Assess for signs and symptoms of bleeding or hemorrhage  1/3/2024 1230 by Lupe Thakur RN  Outcome: Progressing     Problem: Skin/Tissue Integrity  Goal: Absence of new skin breakdown  Description: 1.  Monitor for areas of redness and/or skin breakdown  2.  Assess vascular access sites hourly  3.  Every 4-6 hours minimum:  Change oxygen saturation probe site  4.  Every 4-6 hours:  If on nasal continuous positive airway pressure, respiratory therapy assess nares and determine need for appliance change or resting period.  1/4/2024 0022 by Juan Key RN  Outcome: Progressing  1/3/2024 1230 by Lupe Thakur RN  Outcome: Progressing

## 2024-01-04 NOTE — PROGRESS NOTES
[facility]  Occupational Therapy  Daily Note  Time:    Time In: 901  Time Out: 1001  Timed Code Treatment Minutes: 60 Minutes  Minutes: 60          Date: 2024  Patient Name: Deloris Watts,   Gender: female      Room: [facility]02/002-A  MRN: [id number]  : 1955  (68 y.o.)  Referring Practitioner: ordering:  Holly Whitaker APRN - CNP, attending: Jax Whatley MD  Diagnosis: traumatic compression fracture of L2 lumbar vertebra, closed initial encounter  Additional Pertinent Hx: Deloris is a 68 year old female whom presented to [facility] ED on 12/15/23 s/p fall on 23.  It is noted that pt has demonstrated reduced balance.  CT imaging of brain completed with no abnormalities, CT of cervical spine indicated fusion at C5-7 with no new abnormalities, CT of lumbar spine shows new inferior endplate fx at L2 and multiple levels of foraminal stenosis.  CT of abdomen/pelvis noted suggestive hepatic laceration/contusion.  It is noted pt has mild TTP of abdomen.  Pt. Underwent an L2 kyphoplasty on 23.  Pt. transferred to the IPR unit on 23 for further medical care [facility]    Restrictions/Precautions:  Restrictions/Precautions: Fall Risk, General Precautions  Position Activity Restriction  Other position/activity restrictions: Pt. s/p kyphoplasty, dialysis MWF with port in R chest region      SUBJECTIVE: Upon arrival pt resting in bed with HOB elevated eating breakfast.  Pt. Agreeable to OT session.     PAIN: 3/10 in R leg    Vitals: Vitals not assessed per clinical judgement, see nursing flowsheet    COGNITION: WFL  BIMs assessement and CAM completed with pt this date.    ADL:     EATING:Independent.   .  CARE Score: 6.     ORAL HYGIENE:Independent.  Mod I completed while seated this date, Mod I at standing level per review of tx note from prior date..  CARE Score: 6.     TOILETING HYGIENE:Independent.

## 2024-01-04 NOTE — PROGRESS NOTES
Discharge pain assessment:    Complete within 3 days of discharge.      Pain Effect on Sleep ()   Ask patient: \"Over the past 5 days, how much of the time has pain made it hard for you to sleep at night?\" 1.  Rarely or not at all     If no pain is reported, end interview.  If pain is reported, continue with the additional questions.   Pain Interference with Therapy Activities   Ask patient: \"Over the past 5 days, how often have you limited your participation in rehabilitation therapy sessions due to pain?\" 1.  Rarely or not at all   Pain Interference with Day to Day Activities   Ask patient: \"Over the past 5 days, how often have you limited your day to day activities (excluding rehabilitation therapy sessions) because of pain?\" 1.  Rarely or not at all

## 2024-01-05 VITALS
HEART RATE: 72 BPM | RESPIRATION RATE: 18 BRPM | BODY MASS INDEX: 18.4 KG/M2 | TEMPERATURE: 97.6 F | DIASTOLIC BLOOD PRESSURE: 75 MMHG | OXYGEN SATURATION: 100 % | HEIGHT: 60 IN | SYSTOLIC BLOOD PRESSURE: 143 MMHG | WEIGHT: 93.7 LBS

## 2024-01-05 LAB
ANION GAP SERPL CALC-SCNC: 11 MEQ/L (ref 8–16)
ANION GAP SERPL CALC-SCNC: 22 MEQ/L (ref 8–16)
BUN SERPL-MCNC: 21 MG/DL (ref 7–22)
BUN SERPL-MCNC: 58 MG/DL (ref 7–22)
CA-I BLD ISE-SCNC: 1.19 MMOL/L (ref 1.12–1.32)
CALCIUM SERPL-MCNC: 9.2 MG/DL (ref 8.5–10.5)
CALCIUM SERPL-MCNC: 9.9 MG/DL (ref 8.5–10.5)
CHLORIDE SERPL-SCNC: 101 MEQ/L (ref 98–111)
CHLORIDE SERPL-SCNC: 99 MEQ/L (ref 98–111)
CO2 SERPL-SCNC: 18 MEQ/L (ref 23–33)
CO2 SERPL-SCNC: 26 MEQ/L (ref 23–33)
CREAT SERPL-MCNC: 3.1 MG/DL (ref 0.4–1.2)
CREAT SERPL-MCNC: 6.5 MG/DL (ref 0.4–1.2)
GFR SERPL CREATININE-BSD FRML MDRD: 16 ML/MIN/1.73M2
GFR SERPL CREATININE-BSD FRML MDRD: 6 ML/MIN/1.73M2
GLUCOSE SERPL-MCNC: 101 MG/DL (ref 70–108)
GLUCOSE SERPL-MCNC: 96 MG/DL (ref 70–108)
MAGNESIUM SERPL-MCNC: 2.2 MG/DL (ref 1.6–2.4)
POTASSIUM SERPL-SCNC: 4.3 MEQ/L (ref 3.5–5.2)
POTASSIUM SERPL-SCNC: 4.9 MEQ/L (ref 3.5–5.2)
SODIUM SERPL-SCNC: 136 MEQ/L (ref 135–145)
SODIUM SERPL-SCNC: 141 MEQ/L (ref 135–145)

## 2024-01-05 PROCEDURE — 99239 HOSP IP/OBS DSCHRG MGMT >30: CPT | Performed by: PHYSICAL MEDICINE & REHABILITATION

## 2024-01-05 PROCEDURE — 90935 HEMODIALYSIS ONE EVALUATION: CPT | Performed by: INTERNAL MEDICINE

## 2024-01-05 PROCEDURE — 82330 ASSAY OF CALCIUM: CPT

## 2024-01-05 PROCEDURE — 6370000000 HC RX 637 (ALT 250 FOR IP): Performed by: PHYSICAL MEDICINE & REHABILITATION

## 2024-01-05 PROCEDURE — 6370000000 HC RX 637 (ALT 250 FOR IP): Performed by: NURSE PRACTITIONER

## 2024-01-05 PROCEDURE — 97129 THER IVNTJ 1ST 15 MIN: CPT

## 2024-01-05 PROCEDURE — 80048 BASIC METABOLIC PNL TOTAL CA: CPT

## 2024-01-05 PROCEDURE — 36415 COLL VENOUS BLD VENIPUNCTURE: CPT

## 2024-01-05 PROCEDURE — 90935 HEMODIALYSIS ONE EVALUATION: CPT

## 2024-01-05 PROCEDURE — 97530 THERAPEUTIC ACTIVITIES: CPT

## 2024-01-05 PROCEDURE — 83735 ASSAY OF MAGNESIUM: CPT

## 2024-01-05 PROCEDURE — 97130 THER IVNTJ EA ADDL 15 MIN: CPT

## 2024-01-05 RX ORDER — BISACODYL 10 MG
10 SUPPOSITORY, RECTAL RECTAL DAILY PRN
Qty: 30 SUPPOSITORY | Refills: 3 | Status: SHIPPED | OUTPATIENT
Start: 2024-01-05

## 2024-01-05 RX ADMIN — POLYETHYLENE GLYCOL 3350 17 G: 17 POWDER, FOR SOLUTION ORAL at 08:15

## 2024-01-05 RX ADMIN — CETIRIZINE HYDROCHLORIDE 5 MG: 5 TABLET ORAL at 08:16

## 2024-01-05 RX ADMIN — AMLODIPINE BESYLATE 5 MG: 5 TABLET ORAL at 08:16

## 2024-01-05 RX ADMIN — FOLIC ACID 500 MCG: 1 TABLET ORAL at 08:16

## 2024-01-05 RX ADMIN — FAMOTIDINE 10 MG: 20 TABLET ORAL at 08:16

## 2024-01-05 RX ADMIN — SEVELAMER CARBONATE 800 MG: 800 TABLET, FILM COATED ORAL at 13:02

## 2024-01-05 RX ADMIN — CALCITRIOL CAPSULES 0.25 MCG 0.25 MCG: 0.25 CAPSULE ORAL at 08:16

## 2024-01-05 RX ADMIN — DICLOFENAC SODIUM 2 G: 10 GEL TOPICAL at 13:01

## 2024-01-05 RX ADMIN — MONTELUKAST SODIUM 10 MG: 10 TABLET ORAL at 08:16

## 2024-01-05 RX ADMIN — SEVELAMER CARBONATE 800 MG: 800 TABLET, FILM COATED ORAL at 08:16

## 2024-01-05 RX ADMIN — CLOPIDOGREL BISULFATE 75 MG: 75 TABLET ORAL at 08:16

## 2024-01-05 RX ADMIN — CYCLOBENZAPRINE 10 MG: 10 TABLET, FILM COATED ORAL at 13:37

## 2024-01-05 RX ADMIN — DOCUSATE SODIUM 100 MG: 100 CAPSULE, LIQUID FILLED ORAL at 08:16

## 2024-01-05 ASSESSMENT — PAIN - FUNCTIONAL ASSESSMENT: PAIN_FUNCTIONAL_ASSESSMENT: ACTIVITIES ARE NOT PREVENTED

## 2024-01-05 ASSESSMENT — PAIN SCALES - GENERAL
PAINLEVEL_OUTOF10: 8
PAINLEVEL_OUTOF10: 0

## 2024-01-05 ASSESSMENT — PAIN DESCRIPTION - LOCATION: LOCATION: OTHER (COMMENT)

## 2024-01-05 ASSESSMENT — PAIN DESCRIPTION - DESCRIPTORS: DESCRIPTORS: SPASM

## 2024-01-05 NOTE — PROGRESS NOTES
[facility]  INPATIENT SPEECH THERAPY  [facility]  DISCHARGE NOTE    TIME   SLP Individual Minutes  Time In: 1330  Time Out: 1400  Minutes: 30  Timed Code Treatment Minutes: 30 Minutes       Date: 2024  Patient Name: Deloris Watts      CSN: [id number]   : 1955  (68 y.o.)  Gender: female   Referring Physician:  Holly Whtiaker, APRN - CNP  Diagnosis: Traumatic compression fracture of L2 lumbar vertebra, closed, initial encounter  Precautions: fall risk  Current Diet: Regular Diet with Thin Liquids  Respiratory Status: Room Air  Swallowing Strategies: Standard Universal Swallow Precautions  Date of Last MBS/FEES: Not Applicable    Pain:  No pain reported.    Subjective: Patient laying in bed upon ST arrival. Agreeable to skilled ST services. No family present. Pleasant and cooperative throughout.     Provided skilled education re: recommendations upon discharge. Encouraged patient to contribute to functional cognitive tasks at home (finances, medications) as well as keeping brain active through word games, card games, board games etc. Patient reports plans to complete finances and medication management alongside .    Short-Term Goals:  SHORT TERM GOAL #1:  Goal 1: Patient will complete immediate/delayed recall tasks with 80% accuracy at YOSHI level, with or without use of compensatory strategies, to improve retention of novel and newly presented information. GOAL NOT MET  INTERVENTIONS:  Did not address d/t focus on other goals    PRIOR SESSION  Delayed Recall of Hospital-Wide Navigation (~24 hour delay)   Locations: 4/5 indep, 1/5 with min cues   Information: 3/5 indep, 2/5 with min cues  *No independent initiation of strategy implementation    SHORT TERM GOAL #2:  Goal 2: Patient will complete problem solving, visual/verbal reasoning, executive functioning tasks with 80% given min cues to improve return to IADLs/ADLs. GOAL MET  INTERVENTIONS: Did not  address d/t focus on other goals    PRIOR SESSION  Problem solving scenarios- Pictured scenes:  -ID of problem:  indep,  with min cues  -Provision of solution: indep,  with min cues  *Overall appropriate safety awareness and ability to determine logical solution for correcting problems.     SHORT TERM GOAL #3:  Goal 3: Patient will complete sequencing and thought organization tasks with 80% accuracy given min cues to improve mental flexibility. GOAL MET  INTERVENTIONS:  Did not address due to focus on other goals.   PRIOR SESSION  Visual Scanning/Navigation of Hospital Map:  independent,  with logical cuing, 3/20 with moderate cuing,  with maximum cuing  *Increased time needed for scanning   *Reduced selective attention impacting success within tasks (I.e., \"2\" vs. \"12\")    SHORT TERM GOAL #4:  Goal 4: Patient will complete complex sustained, selective, alternating, and divided attention tasks with no more than 3 errors/redirections in 5 minutes/task completion to improve mental focus. GOAL MET  INTERVENTIONS:   4 [place] in the Corner  Improved success with card game this session. Initial cues required for game play, however, improvement throughout game progression. Decreased attention/recall as patient requiring cues to look at piles for possible moves. Patient with good insight to difficulties stating, \"usually I catch on quick but this one is tricky\"    Long-Term Goals:  Time Frame for Long Term Goals: 3 weeks    LONG TERM GOAL #1:  Goal 1: Patient will improve cognitive function to a level of supervision in order to make a safe return to OF. GOAL NOT MET      Comprehension: 5 - Patient understands basic needs (hungry/hot/pain)  Expression: 6 - Device used to express complex ideas/needs  Social Interaction: 6 - Patient requires medication for mood and/or effect  Problem Solvin - Patient solves simple/routine tasks 75-90%+   Memory: 4 - Patient remembers 75-90%+ of the

## 2024-01-05 NOTE — PROGRESS NOTES
Patient discharged in stable condition as per order of attending physician to Outpatient Therapy per staff at Time: 5:00PM and Via Wheelchair to car.    AVS provided by LPN at time of discharge, which includes all necessary medical information pertaining to the patients current course of illness, treatment, medications, post-discharge goals of care, and treatment preferences.     Patient/ family verbalize understanding of discharge plan and are in agreement with goal/plan/treatment preferences.     Belongings including Glasses, Jewelry, cell phone and   sent with patient.      Home medications sent home with patient yes    Availability of \"My Chart\" offered to patient as a tool for updated health record.  Steps for activation discussed with patient as mentioned on AVS.

## 2024-01-05 NOTE — DISCHARGE INSTR - DIET
Good nutrition is important when healing from an illness, injury, or surgery.  Follow any nutrition recommendations given to you during your hospital stay.   If you were given an oral nutrition supplement while in the hospital, continue to take this supplement at home.  You can take it with meals, in-between meals, and/or before bedtime. These supplements can be purchased at most local grocery stores, pharmacies, and chain [employer] ([employer])-stores.   If you have any questions about your diet or nutrition, call the hospital and ask for the dietitian.

## 2024-01-05 NOTE — CARE COORDINATION
Patient went to dialysis this am. Per Dialysis nurse report 1 L removed from patient this shift. Patient in room at present working with therapy, c/o muscle spasms, Dr. Whatley aware and stat BMP, Mg and ionized Ca ordered currently waiting on results. Electronically signed by Diana Bah LPN on 1/5/2024 at 2:26 PM

## 2024-01-05 NOTE — PROGRESS NOTES
Patient: Deloris Watts  Unit/Bed: 8K-02/002-A  YOB: 1955  MRN: [id number] Acct: [id number]   Admitting Diagnosis: Traumatic compression fracture of L2 lumbar vertebra, closed, initial encounter ([facility]) [S32.020A]  Admit Date:  12/21/2023  Hospital Day: 14    Assessment:     Principal Problem:    Traumatic compression fracture of L2 lumbar vertebra, closed, initial encounter ([facility])  Active Problems:    Moderate malnutrition ([facility])    History of CVA (cerebrovascular accident)    Allergic rhinitis    Essential hypertension    Age related osteoporosis    ESRD (end stage renal disease) ([facility])    Hyperkalemia    Anemia, chronic disease    Secondary hyperparathyroidism of renal origin ([facility])    Liver injury, laceration    Closed compression fracture of L2 lumbar vertebra, initial encounter ([facility])  Resolved Problems:    * No resolved hospital problems. *      Plan:     Continue to follow        Subjective:     Patient has no complaint of CP or SOB     diclofenac sodium  2 g Topical 4x Daily    traZODone  50 mg Oral Nightly    sevelamer  800 mg Oral TID WC    senna  2 tablet Oral Nightly    polyethylene glycol  17 g Oral BID    montelukast  10 mg Oral Daily    folic acid  500 mcg Oral Daily    fluticasone  2 spray Each Nostril Daily    famotidine  10 mg Oral Daily    docusate sodium  1 enema Rectal Daily    docusate sodium  100 mg Oral BID    cetirizine  5 mg Oral Daily    calcitRIOL  0.25 mcg Oral Daily    atorvastatin  10 mg Oral Nightly    amLODIPine  5 mg Oral Daily    linaclotide  290 mcg Oral QAM AC    melatonin  6 mg Oral Nightly    clopidogrel  75 mg Oral Daily     Continuous Infusions:   sodium chloride       PRN Meds:traMADol, sodium chloride flush, phenol, ondansetron, cyclobenzaprine, sodium chloride, acetaminophen, traZODone, bisacodyl, diclofenac sodium    Review of Systems  Pertinent items are noted in HPI.    Objective:     No data found.  I/O last 3 completed shifts:  In: 600 [P.O.:600]  Out: 1000    No intake/output data recorded.    BP (!) 146/73   Pulse 68   Temp 97.5 °F (36.4 °C) (Oral)   Resp 16   Ht 1.524 m (5')   Wt 47.1 kg (103 lb 13.4 oz)   SpO2 96%   BMI 20.28 kg/m²     General appearance: alert, appears stated age, and cooperative  Head: Normocephalic, without obvious abnormality, atraumatic  Lungs: clear to auscultation bilaterally  Abdomen: soft, non-tender; bowel sounds normal; no masses,  no organomegaly  Extremities: extremities normal, atraumatic, no cyanosis or edema  Skin: Skin color, texture, turgor normal. No rashes or lesions  Neuro: weak    Electronically signed by Gareth Mosquera MD on 1/4/2024 at 7:46 PM

## 2024-01-05 NOTE — PLAN OF CARE
Problem: Discharge Planning  Goal: Discharge to home or other facility with appropriate resources  1/5/2024 0847 by Kusum Guajardo LISW  Note: Patient to be discharged on Friday, 1/5 to home. Patient will be under the supervision of her spouse, Tenzin. Family education was provided on Thursday, 1/4. Patient will be receiving services through [facility]. SW provided information to [facility] on 1/4 via fax.

## 2024-01-05 NOTE — PROGRESS NOTES
[facility]  Recreational Therapy  Discharge Note  Inpatient Rehabilitation Unit         Date:  1/5/2024       Patient Name: Deloris Watts      MRN: [id number]       YOB: 1955 (68 y.o.)       Gender: female  Diagnosis: traumatic compression fracture of L2 lumbar vertebra, closed initial encounter  Referring Practitioner: ordering:  Holly Whitaker APRN - CNP, attending: Jax Whatley MD    Patient discharged from Recreational Therapy at this time.  See recreational therapy notes for details.    Electronically signed by: LOUIS Rodriguez  Date: 1/5/2024

## 2024-01-05 NOTE — FLOWSHEET NOTE
2.5 hour treatment completed. 1L of fluid removed. Patient tolerated fluid removal well. Bilateral cath ports flushed with normal saline, clamped, and secured with tegos. CVC drsg clean, dry, and intact. Report called to primary RN. TX record printed for scanning into EMR.   01/05/24 0915 01/05/24 1200   Vital Signs   BP (!) 154/115 (!) 143/75   Temp 98.2 °F (36.8 °C) 97.6 °F (36.4 °C)   Pulse 72 72   Respirations 17 18   SpO2 100 %  --    Weight - Scale 43.5 kg (95 lb 14.4 oz) 42.5 kg (93 lb 11.1 oz)   Weight Method Bed scale Bed scale   Percent Weight Change -8.03 -2.3   Post-Hemodialysis Assessment   Post-Treatment Procedures  --  Blood returned;Catheter Capped, clamped with Saline x2 ports   Machine Disinfection Process  --  Acid/Vinegar Clean;Heat Disinfect;Exterior Machine Disinfection   Blood Volume Processed (Liters)  --  50.8 L   Dialyzer Clearance  --  Lightly streaked   Duration of Treatment (minutes)  --  150 minutes   Heparin Amount Administered During Treatment (mL)  --  0 mL   Hemodialysis Intake (ml)  --  400 ml   Hemodialysis Output (ml)  --  1400 ml   NET Removed (ml)  --  1000   Tolerated Treatment  --  Good

## 2024-01-05 NOTE — PROGRESS NOTES
[facility]   Nephrology progress note  1/5/2024, 11:00 AM      Pt Name:    Deloris Watts  MRN:     [id number]     YOB: 1955  Admit Date:    12/21/2023  1:13 PM    Chief Complaint: Nephrology following for ESRD and hemodialysis    Subjective:  Patient was seen and examined this morning  No chest pain or shortness of breath  Seen during dialysis tolerating procedure well    Objective:  24HR INTAKE/OUTPUT:    Intake/Output Summary (Last 24 hours) at 1/5/2024 1100  Last data filed at 1/5/2024 0903  Gross per 24 hour   Intake 390 ml   Output --   Net 390 ml        Admission weight: 47 kg (103 lb 9.9 oz)  Wt Readings from Last 3 Encounters:   01/05/24 43.5 kg (95 lb 14.4 oz)   12/20/23 47 kg (103 lb 9.9 oz)   12/15/23 45.4 kg (100 lb)        Vitals :   Vitals:    01/04/24 2335 01/05/24 0545 01/05/24 0813 01/05/24 0915   BP: 130/67  (!) 144/64 (!) 154/115   Pulse: 79  97 72   Resp: 14  16 17   Temp: 97.3 °F (36.3 °C)  97.7 °F (36.5 °C) 98.2 °F (36.8 °C)   TempSrc: Oral  Oral    SpO2: 100%  100% 100%   Weight:  47.3 kg (104 lb 4.4 oz)  43.5 kg (95 lb 14.4 oz)   Height:  1.524 m (5')         Physical examination  General Appearance: Awake and alert no no distress  Mouth/Throat: Oral mucosa moist  Neck: Accessory muscle  Lungs: Clear  Heart:  S1, S2 heard  GI: soft, non-tender,   Extremities no edema    Medications:  Infusion:    sodium chloride       Meds:    diclofenac sodium  2 g Topical 4x Daily    traZODone  50 mg Oral Nightly    sevelamer  800 mg Oral TID WC    senna  2 tablet Oral Nightly    polyethylene glycol  17 g Oral BID    montelukast  10 mg Oral Daily    folic acid  500 mcg Oral Daily    fluticasone  2 spray Each Nostril Daily    famotidine  10 mg Oral Daily    docusate sodium  1 enema Rectal Daily    docusate sodium  100 mg Oral BID    cetirizine  5 mg Oral Daily    calcitRIOL  0.25 mcg Oral Daily    atorvastatin  10 mg Oral Nightly    amLODIPine  5 mg Oral Daily

## 2024-01-05 NOTE — PLAN OF CARE
Problem: Discharge Planning  Goal: Discharge to home or other facility with appropriate resources  Outcome: Progressing  Flowsheets (Taken 1/5/2024 0338)  Discharge to home or other facility with appropriate resources: Identify barriers to discharge with patient and caregiver     Problem: Safety - Adult  Goal: Free from fall injury  Outcome: Progressing  Flowsheets (Taken 1/5/2024 0338)  Free From Fall Injury: Instruct family/caregiver on patient safety     Problem: ABCDS Injury Assessment  Goal: Absence of physical injury  Outcome: Progressing     Problem: Pain  Goal: Verbalizes/displays adequate comfort level or baseline comfort level  Outcome: Progressing  Flowsheets (Taken 1/5/2024 0338)  Verbalizes/displays adequate comfort level or baseline comfort level:   Encourage patient to monitor pain and request assistance   Assess pain using appropriate pain scale   Administer analgesics based on type and severity of pain and evaluate response   Implement non-pharmacological measures as appropriate and evaluate response     Problem: Chronic Conditions and Co-morbidities  Goal: Patient's chronic conditions and co-morbidity symptoms are monitored and maintained or improved  Outcome: Progressing  Flowsheets (Taken 1/5/2024 0338)  Care Plan - Patient's Chronic Conditions and Co-Morbidity Symptoms are Monitored and Maintained or Improved: Monitor and assess patient's chronic conditions and comorbid symptoms for stability, deterioration, or improvement     Problem: Musculoskeletal - Adult  Goal: Return ADL status to a safe level of function  Outcome: Progressing  Flowsheets (Taken 1/5/2024 0338)  Return ADL Status to a Safe Level of Function: Administer medication as ordered     Problem: Metabolic/Fluid and Electrolytes - Adult  Goal: Electrolytes maintained within normal limits  Outcome: Progressing  Flowsheets (Taken 1/5/2024 0338)  Electrolytes maintained within normal limits: Administer electrolyte replacement as

## 2024-01-05 NOTE — PLAN OF CARE
Care plan reviewed with patient.  Patient verbalizes understanding of the plan of care and contribute to goal setting.      Problem: Discharge Planning  Goal: Discharge to home or other facility with appropriate resources  1/5/2024 0855 by Diana Bah LPN  Outcome: Completed  Flowsheets (Taken 1/5/2024 0855)  Discharge to home or other facility with appropriate resources: Identify barriers to discharge with patient and caregiver     Problem: Safety - Adult  Goal: Free from fall injury  1/5/2024 0855 by Diana Bah LPN  Outcome: Completed  Flowsheets (Taken 1/5/2024 0855)  Free From Fall Injury: Instruct family/caregiver on patient safety  1/5/2024 0338 by Vaishnavi De La Fuente, RN  Outcome: Progressing  Flowsheets (Taken 1/5/2024 0338)  Free From Fall Injury: Instruct family/caregiver on patient safety     Problem: ABCDS Injury Assessment  Goal: Absence of physical injury  1/5/2024 0855 by Diana aBh LPN  Outcome: Completed  Flowsheets (Taken 1/5/2024 0855)  Absence of Physical Injury: Implement safety measures based on patient assessment     Problem: Pain  Goal: Verbalizes/displays adequate comfort level or baseline comfort level  1/5/2024 0855 by Diana Bah LPN  Outcome: Completed  Flowsheets (Taken 1/5/2024 0855)  Verbalizes/displays adequate comfort level or baseline comfort level:   Encourage patient to monitor pain and request assistance   Assess pain using appropriate pain scale   Administer analgesics based on type and severity of pain and evaluate response   Implement non-pharmacological measures as appropriate and evaluate response     Problem: Chronic Conditions and Co-morbidities  Goal: Patient's chronic conditions and co-morbidity symptoms are monitored and maintained or improved  1/5/2024 0855 by Diana Bah LPN  Outcome: Completed  Flowsheets (Taken 1/5/2024 0338 by Vaishnavi De La Fuente, RN)  Care Plan - Patient's Chronic Conditions and Co-Morbidity Symptoms are Monitored and  Maintained or Improved: Monitor and assess patient's chronic conditions and comorbid symptoms for stability, deterioration, or improvement     Problem: Musculoskeletal - Adult  Goal: Return ADL status to a safe level of function  1/5/2024 0855 by Diana Bah LPN  Outcome: Completed     Problem: Metabolic/Fluid and Electrolytes - Adult  Goal: Electrolytes maintained within normal limits  1/5/2024 0855 by Diana Bah LPN  Outcome: Completed     Problem: Metabolic/Fluid and Electrolytes - Adult  Goal: Hemodynamic stability and optimal renal function maintained  1/5/2024 0855 by Diana Bah LPN  Outcome: Completed     Problem: Hematologic - Adult  Goal: Maintains hematologic stability  1/5/2024 0855 by Diana Bah LPN  Outcome: Completed     Problem: Skin/Tissue Integrity  Goal: Absence of new skin breakdown  Description: 1.  Monitor for areas of redness and/or skin breakdown  2.  Assess vascular access sites hourly  3.  Every 4-6 hours minimum:  Change oxygen saturation probe site  4.  Every 4-6 hours:  If on nasal continuous positive airway pressure, respiratory therapy assess nares and determine need for appliance change or resting period.  1/5/2024 0855 by Diana Bah LPN  Outcome: Completed  Note: No new skin breakdown noted. Scattered bruising cont.     Problem: Nutrition Deficit:  Goal: Optimize nutritional status  1/5/2024 0855 by Diana Bah LPN  Outcome: Completed  Flowsheets (Taken 1/5/2024 0855)  Nutrient intake appropriate for improving, restoring, or maintaining nutritional needs:   Assess nutritional status and recommend course of action   Monitor oral intake, labs, and treatment plans

## 2024-01-05 NOTE — DISCHARGE SUMMARY
Physical Medicine & Rehabilitation   Discharge Summary     Patient Identification:  Deloris Watts  : 1955  Admit date: 2023  Discharge date: 2024   Attending provider: Jax Whatley MD        Primary care provider: Johana Weldon MD     Discharge Diagnoses:   Status post fall resulting  Lumbar spine contusion resulting lumbar spine sprain and muscular strain with acute L2 inferior endplate pathological (due to osteoporosis) compression fracture requiring kyphoplasty  Liver laceration/contusion  History of stroke with left hemiparesis  End-stage renal disease requiring hemodialysis  Nonfunctioning hemodialysis catheter require catheter replacement  Hyperkalemia  Hypertension  Irritable bowel syndrome  Hyperlipidemia  Osteoporosis  History of hydronephrosis  History of fibromyalgia  History of depression and anxiety  History of right proximal humerus fracture requiring ORIF  History of right wrist fracture requiring casting  History of right ankle fracture requiring casting      Discharge Functional Status:    Physical therapy:  Bed Mobility:  Rolling to Left: Modified Independent   Rolling to Right: Modified Independent   Supine to Sit: Modified Independent  Sit to Supine: Modified Independent     Transfers:  Sit to Stand:Modified Independent  Stand to Sit:Modified Independent  To/From Bed and Chair:Modified Independent    Car:Modified Independent     Ambulation:  Modified Independent  Distance: 250' x 2, 100' x 1, multiple shorter distances, up/down 5' ramp  Surface: Level Tile and Ramp  Device: 4 Wheeled Walker  Gait Deviations:  Decreased Step Length Bilaterally, Decreased Gait Speed, and Decreased Terminal Knee Extension    Modified Independent  Distance: 250' x 2, 50' x 2, multiple shorter distances.   Surface: Level Tile  Device: 4 Wheeled Walker  Gait Deviations:  Slow Nana, Decreased Step Length Bilaterally, Decreased Gait Speed, Decreased Heel Strike Bilaterally, and  [id number]  [phone]          Follow up with Dr. Johana Weldon MD  Tuesday Feb 20, 2024  Specialty: Family Medicine  Follow up, Appointment time: 1:30 PM [facility] [facility] [address]  [phone]   MAY    13 Office Visit with CARLOS MANUEL Salazar CNP  Monday May 13, 2024 8:30 AM [facility]  [address]  [address]  [facility] [address]  [phone]          Follow up with CARLOS MANUEL Salazar - CNP  Monday May 13, 2024  Specialty: Nurse Practitioner, Family Medicine, Urology  Appointment time: 8:30 am [address]  [address]  [facility] [address]  [phone]   OCT    29 Office Visit with Dr. Ulises Adrian MD  Tuesday Oct 29, 2024 1:00 PM [facility]  [address] [place] [address]  [address]  [facility] [address]  [phone]          Follow up with Dr. Ulises Adrian MD  Tuesday Oct 29, 2024  Specialty: Cardiology  Appointment time: 1:00 PM [facility]  [address] [place] [address], [address]  [facility] [address]  [phone]         Discharge Medications:  Current Discharge Medication List             Details   bisacodyl (DULCOLAX) 10 MG suppository Place 1 suppository rectally daily as needed for Constipation  Qty: 30 suppository, Refills: 3      traMADol (ULTRAM) 50 MG tablet Take 0.5-1 tablets by mouth every 6 hours as needed for Pain (take 1/2 tab for pain level 5~7/10 ; take 1 tab for pain level 8~10/10) for up to 7 days. Max Daily Amount: 200 mg  Qty: 28 tablet, Refills: 0    Comments: Reduce doses taken as pain becomes manageable  Associated Diagnoses: Closed compression fracture of L2 lumbar vertebra, initial encounter ([facility])      fluticasone (FLONASE) 50 MCG/ACT nasal spray 2 sprays by Each Nostril route daily  Qty: 16 g, Refills: 3      diclofenac sodium (VOLTAREN) 1 % GEL Apply 2 g topically 4 times daily as needed for Pain  Qty: 150 g, Refills: 3      melatonin 3 MG TABS tablet Take 2 tablets by mouth at bedtime For sleep  Qty: 60 tablet, Refills: 3      famotidine

## 2024-01-05 NOTE — PROGRESS NOTES
[facility]  INPATIENT PHYSICAL THERAPY  Discharge Note  [facility] - 8K-02/[id number]    Time In: 1408  Time Out: 1421  Timed Code Treatment Minutes: 13 Minutes  Minutes: 13          Date: 2024  Patient Name: Deloris Watts,  Gender:  female        MRN: [id number]  : 1955  (68 y.o.)  Referral Date : 23  Referring Practitioner: Holly Whitaker APRN - CNP  Diagnosis: Traumatic compression fracture of L2 lumbar vertebra, closed, initial encounte  Treatment Diagnosis: difficulty in walking  Additional Pertinent Hx: Per H&P:Deloris Watts is a 68 y.o. right-handed  female with history of hypertension, hyperlipidemia, hydronephrosis, fibromyalgia, irritable bowel syndrome, osteoporosis, end-stage renal disease on hemodialysis since 2023, stroke with left side weakness in , anemia, depression, anxiety, right wrist and right ankle fracture treated conservatively, right proximal humeral fracture due to fall requiring ORIF, status post right carpal tunnel release surgery, status post cervical spine surgery, status post hysterectomy and bilateral oophorectomy, hemorrhoidectomy, sinus surgery, tubal ligation, LASEK surgery, is admitted to the inpatient rehabilitation unit on 2023 for intensive inpatient rehabilitation treatment of impairment and disability secondary to fall accident resulting L2 compression fracture requiring kyphoplasty, and liver laceration.She was found to have new L2 inferior endplate fracture, and hepatic laceration/contusion.  Nephrology was consulted to continue her hemodialysis.  MRI of thoracic and lumbar spine were ordered and performed on 12/15/2023.  Lumbar spine MRI done on 12/15/2023 confirmed the presence of acute L2 20% inferior endplate compression fracture without displacement of the fragment.  Thoracic spine MRI revealed no fracture or disc protrusion, and normal spinal cord. Neurosurgery was consulted

## 2024-01-05 NOTE — PROGRESS NOTES
[facility]  INPATIENT OCCUPATIONAL THERAPY  [facility]  DISCHARGE NOTE    Date: 2024  Patient Name: Deloris Watts,   Gender: female      MRN: [id number]  : 1955  (68 y.o.)  Referring Practitioner: ordering:  Holly Whitaker APRN - CNP, attending: Jax Whatley MD  Diagnosis: traumatic compression fracture of L2 lumbar vertebra, closed initial encounter  Additional Pertinent Hx: Deloris is a 68 year old female whom presented to [facility] ED on 12/15/23 s/p fall on 23.  It is noted that pt has demonstrated reduced balance.  CT imaging of brain completed with no abnormalities, CT of cervical spine indicated fusion at C5-7 with no new abnormalities, CT of lumbar spine shows new inferior endplate fx at L2 and multiple levels of foraminal stenosis.  CT of abdomen/pelvis noted suggestive hepatic laceration/contusion.  It is noted pt has mild TTP of abdomen.  Pt. Underwent an L2 kyphoplasty on 23.  Pt. transferred to the IPR unit on 23 for further medical care and referred to [facility] OT services.    Restrictions/Precautions:  Restrictions/Precautions: Fall Risk, General Precautions            Position Activity Restriction  Other position/activity restrictions: Pt. s/p kyphoplasty, dialysis MWF with port in R chest region         Past Medical History:   Diagnosis Date    Allergic rhinitis     Anemia in CKD (chronic kidney disease)     Anxiety     CHF (congestive heart failure) (HCC)     Closed fracture of right proximal humerus 2021    ORIF    Closed right ankle fracture     In ; treated with casting    CVA (cerebral infarction)     in  ; with left-sided weakness    Depression     Fibromyalgia     in     Headache(784.0)     Hemiplegia and hemiparesis following cerebral infarction affecting left non-dominant side (HCC)     in     Hydronephrosis 2023    Urogenital Implants, calculus of ureter, UTI     care regimen. GOAL MET   Short Term Goal 2: Pt. to demo Mod I during standing tasks up to 8 min 1-2 hand release to improve activity tolerance with ADLs. GOAL MET   Short Term Goal 3: Pt. to recall at least 3 fall prevention techniques with good carryover during daily occupations to prevent further falls from occurring.  GOAL MET     Long Term Goals  Time Frame for Long Term Goals : 2 weeks from OT eval on IPR unit  Long Term Goal 1: Pt. to demo Mod I with self care regimen (dressing, sponge bathing, grooming, toileting) with good safety to progress to PLOF. GOAL MET   Long Term Goal 2: Pt to demo Mod I with toilet transfers with 0 cues for proper technique to maximize independence with ADLs. GOAL MET   Long Term Goal 3: Pt. to demo Mod I with light IADLs (simple snack prep, bed making, laundry) to advance engagement with daily occupations.  GOAL MET

## 2024-01-08 ENCOUNTER — TELEPHONE (OUTPATIENT)
Dept: FAMILY MEDICINE CLINIC | Age: 69
End: 2024-01-08

## 2024-01-08 ENCOUNTER — CLINICAL DOCUMENTATION ONLY (OUTPATIENT)
Facility: CLINIC | Age: 69
End: 2024-01-08

## 2024-01-08 NOTE — TELEPHONE ENCOUNTER
Care Transitions Initial Follow Up Call    Outreach made within 2 business days of discharge: Yes    Patient: Ethan Watts Patient : 1955   MRN: [id number]  Reason for Admission: There are no discharge diagnoses documented for the most recent discharge.  Discharge Date: 24       Spoke with: ethan    Discharge department/facility: [facility]    TCM Interactive Patient Contact:  Was patient able to fill all prescriptions: Yes  Was patient instructed to bring all medications to the follow-up visit: Yes  Is patient taking all medications as directed in the discharge summary? Yes  Does patient understand their discharge instructions: Yes  Does patient have questions or concerns that need addressed prior to 7-14 day follow up office visit: no    Scheduled appointment with PCP within 7-14 days    Follow Up  Future Appointments   Date Time Provider Department Center   2024 10:30 AM [facility]   2024  1:30 PM Johana Weldon MD [facility]   2024  8:30 AM Joya Rosario, APRN - CNP [facility]   10/29/2024  1:00 PM Ulises Adrian MD [facility]       Mahi Chau LPN

## 2024-01-13 PROBLEM — D69.6 THROMBOCYTOPENIA (HCC): Status: RESOLVED | Noted: 2022-12-23 | Resolved: 2024-01-13

## 2024-01-13 PROBLEM — F33.1 MODERATE EPISODE OF RECURRENT MAJOR DEPRESSIVE DISORDER (HCC): Status: ACTIVE | Noted: 2024-01-13

## 2024-01-13 NOTE — PROGRESS NOTES
[facility]  [address]  [place] [address]  Phone:  [phone]     Post-Discharge Transitional Care  Follow Up      Deloris Watts   YOB: 1955    Date of Office Visit:  1/16/2024  Date of Hospital Admission: 12/21/23  Date of Hospital Discharge: 1/5/24  Risk of hospital readmission (high >=14%. Medium >=10%) :Readmission Risk Score: 23.4    Care management risk score Rising risk (score 2-5) and Complex Care (Scores >=6): No Risk Score On File     Non face to face  following discharge, date last encounter closed (first attempt may have been earlier): 01/08/2024    Call initiated 2 business days of discharge: Yes    ASSESSMENT/PLAN:     Hospital discharge follow-up        -     Hospital records, labs, and imaging were reviewed and are summarized below.    Closed compression fracture of L2 lumbar vertebra with routine healing, subsequent encounter/Fall, subsequent encounter        -     She participated in aggressive inpatient rehab and did well.  She will continue with outpatient exercises.      Moderate malnutrition (HCC)        -     A healthy diet and routine physical activity encouraged.    Secondary hyperparathyroidism of renal origin (HCC)        -     Follow with nephrology as directed.      Chronic diastolic (congestive) heart failure (HCC)        -     She is asymptomatic so will continue on current medications and monitor for symptoms.     Moderate episode of recurrent major depressive disorder (HCC)        -     Her mood has been stable overall so will continue to monitor.     Medical Decision Making: high complexity    Return if symptoms worsen or fail to improve.           Subjective:     Deloris Watts is a 67 yo female who presents today for transition of care.  She was hospitalized at [facility] from 12/21/23-1/5/24.  Hospital records, labs, and imaging were reviewed and are summarized below.  She fell on 12/14/23 and sustained an L2 compression fracture requiring

## 2024-01-16 ENCOUNTER — OFFICE VISIT (OUTPATIENT)
Dept: FAMILY MEDICINE CLINIC | Age: 69
End: 2024-01-16

## 2024-01-16 VITALS
TEMPERATURE: 97.7 F | BODY MASS INDEX: 20.5 KG/M2 | SYSTOLIC BLOOD PRESSURE: 118 MMHG | WEIGHT: 104.4 LBS | DIASTOLIC BLOOD PRESSURE: 60 MMHG | RESPIRATION RATE: 16 BRPM | HEART RATE: 60 BPM | HEIGHT: 60 IN

## 2024-01-16 DIAGNOSIS — N25.81 SECONDARY HYPERPARATHYROIDISM OF RENAL ORIGIN (HCC): ICD-10-CM

## 2024-01-16 DIAGNOSIS — E44.0 MODERATE MALNUTRITION (HCC): ICD-10-CM

## 2024-01-16 DIAGNOSIS — F51.04 PSYCHOPHYSIOLOGICAL INSOMNIA: ICD-10-CM

## 2024-01-16 DIAGNOSIS — S32.020D CLOSED COMPRESSION FRACTURE OF L2 LUMBAR VERTEBRA WITH ROUTINE HEALING, SUBSEQUENT ENCOUNTER: Primary | ICD-10-CM
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
## 2024-01-16 DIAGNOSIS — F33.42 RECURRENT MAJOR DEPRESSIVE DISORDER, IN FULL REMISSION (HCC): ICD-10-CM

## 2024-01-16 DIAGNOSIS — F33.1 MODERATE EPISODE OF RECURRENT MAJOR DEPRESSIVE DISORDER (HCC): ICD-10-CM

## 2024-01-16 DIAGNOSIS — Z09 HOSPITAL DISCHARGE FOLLOW-UP: Primary | ICD-10-CM

## 2024-01-16 DIAGNOSIS — I50.32 CHRONIC DIASTOLIC (CONGESTIVE) HEART FAILURE (HCC): ICD-10-CM

## 2024-01-16 DIAGNOSIS — S32.020D CLOSED COMPRESSION FRACTURE OF L2 LUMBAR VERTEBRA WITH ROUTINE HEALING, SUBSEQUENT ENCOUNTER: ICD-10-CM

## 2024-01-16 DIAGNOSIS — F41.9 ANXIETY: ICD-10-CM

## 2024-01-16 RX ORDER — BUPROPION HYDROCHLORIDE 150 MG/1
TABLET ORAL
Qty: 90 TABLET | Refills: 1 | Status: SHIPPED | OUTPATIENT
Start: 2024-01-16

## 2024-01-16 RX ORDER — TRAZODONE HYDROCHLORIDE 50 MG/1
TABLET ORAL
Qty: 90 TABLET | Refills: 1 | Status: SHIPPED | OUTPATIENT
Start: 2024-01-16

## 2024-01-16 RX ORDER — BUPROPION HYDROCHLORIDE 300 MG/1
TABLET ORAL
Qty: 90 TABLET | Refills: 1 | Status: SHIPPED | OUTPATIENT
Start: 2024-01-16

## 2024-01-22 ENCOUNTER — TELEPHONE (OUTPATIENT)
Age: 69
End: 2024-01-22

## 2024-01-22 NOTE — TELEPHONE ENCOUNTER
Tenzin (Deloris's ) called stating Deloris does not want to have surgery for the fistula. She had back surgery recently and does not feel like she can handle it at this time. He also requested to cancel her PAT appointment 1/23/2024.     I spoke with Evelyn in PAT to notify them of the cancellation.  I notified Loren / Dr. Rich of the cancellation of surgery.

## 2024-01-23 ENCOUNTER — NURSE ONLY (OUTPATIENT)
Dept: LAB | Age: 69
End: 2024-01-23

## 2024-01-23 ENCOUNTER — OFFICE VISIT (OUTPATIENT)
Dept: NEUROSURGERY | Age: 69
End: 2024-01-23

## 2024-01-23 ENCOUNTER — HOSPITAL ENCOUNTER (OUTPATIENT)
Dept: PREADMISSION TESTING | Age: 69
Discharge: HOME | End: 2024-01-23

## 2024-01-23 VITALS
HEIGHT: 60 IN | HEART RATE: 91 BPM | SYSTOLIC BLOOD PRESSURE: 127 MMHG | WEIGHT: 104.5 LBS | DIASTOLIC BLOOD PRESSURE: 65 MMHG | BODY MASS INDEX: 20.52 KG/M2

## 2024-01-23 DIAGNOSIS — S32.020D COMPRESSION FRACTURE OF L2 VERTEBRA WITH ROUTINE HEALING, SUBSEQUENT ENCOUNTER: ICD-10-CM

## 2024-01-23 DIAGNOSIS — Z98.890 S/P KYPHOPLASTY: Primary | ICD-10-CM

## 2024-01-23 DIAGNOSIS — R35.0 URINARY FREQUENCY: ICD-10-CM

## 2024-01-23 LAB
BACTERIA URNS QL MICRO: ABNORMAL /HPF
BILIRUB UR QL STRIP.AUTO: NEGATIVE
CASTS #/AREA URNS LPF: ABNORMAL /LPF
CASTS 2: ABNORMAL /LPF
CHARACTER UR: CLEAR
COLOR: YELLOW
CRYSTALS URNS MICRO: ABNORMAL
EPITHELIAL CELLS, UA: ABNORMAL /HPF
GLUCOSE UR QL STRIP.AUTO: 100 MG/DL
HGB UR QL STRIP.AUTO: NEGATIVE
KETONES UR QL STRIP.AUTO: NEGATIVE
MISCELLANEOUS 2: ABNORMAL
NITRITE UR QL STRIP: NEGATIVE
PH UR STRIP.AUTO: >= 9 [PH] (ref 5–9)
PROT UR STRIP.AUTO-MCNC: 100 MG/DL
RBC URINE: ABNORMAL /HPF
RENAL EPI CELLS #/AREA URNS HPF: ABNORMAL /[HPF]
SP GR UR REFRACT.AUTO: 1.01 (ref 1–1.03)
UROBILINOGEN, URINE: 0.2 EU/DL (ref 0–1)
WBC #/AREA URNS HPF: ABNORMAL /HPF
WBC #/AREA URNS HPF: ABNORMAL /[HPF]
YEAST LIKE FUNGI URNS QL MICRO: ABNORMAL

## 2024-01-23 PROCEDURE — 99024 POSTOP FOLLOW-UP VISIT: CPT

## 2024-01-23 ASSESSMENT — ENCOUNTER SYMPTOMS
CHEST TIGHTNESS: 0
BACK PAIN: 1
SHORTNESS OF BREATH: 0

## 2024-01-23 NOTE — PROGRESS NOTES
[facility] [place] [facility]  [facility] NEUROSURGERY  [address]  [facility] [address]  Dept: [phone]  Loc: [phone]    2024    Deloris Watts (:  1955) is a 68 y.o. female, Established patient, here for evaluation of the following chief complaint(s):  Post-Op Check (S/P Kyphoplasty )    Subjective   SUBJECTIVE/OBJECTIVE:  HPI   Deloris Watts is a 68 y.o. female  presenting today for her initial postop visit after a L2 kyphoplasty on 2023 with Dr. Garza. After surgery she participated in inpatient rehab until 24 when she was discharged home.     Patient arrives today ambulating with a wheeled walker and with her . She reports that is is doing well and her back pain is currently 6/10, she notes that before surgery it was up to 10/10. She notices that she is feeling better with each day. She does report that she has some Tramadol at home from her hospitalization that she uses infrequently.  Patient is still unstable and has had a recent fall since surgery. Her  report that she landed on her knees and had no changes in her back pain after the fall. Patient also notes that she currently has a UTI.     Review of Systems   Constitutional:  Positive for fatigue. Negative for activity change and fever.   Respiratory:  Negative for chest tightness and shortness of breath.    Cardiovascular:  Negative for chest pain.   Genitourinary:  Positive for difficulty urinating, frequency and urgency.   Musculoskeletal:  Positive for back pain and gait problem.   Neurological:  Negative for dizziness, weakness, light-headedness, numbness and headaches.   Psychiatric/Behavioral:  Negative for agitation and behavioral problems.         Objective   Physical Exam  Constitutional:       General: She is not in acute distress.     Appearance: Normal appearance. She is normal weight.   HENT:      Head: Normocephalic and atraumatic.      Mouth/Throat:

## 2024-01-24 RX ORDER — CEPHALEXIN 500 MG/1
500 CAPSULE ORAL 2 TIMES DAILY
Qty: 6 CAPSULE | Refills: 0 | Status: SHIPPED | OUTPATIENT
Start: 2024-01-24 | End: 2024-01-27

## 2024-01-25 LAB
BACTERIA UR CULT: ABNORMAL
ORGANISM: ABNORMAL

## 2024-02-10 ENCOUNTER — HOSPITAL ENCOUNTER (EMERGENCY)
Age: 69
Discharge: HOME | End: 2024-02-10
Payer: MEDICARE

## 2024-02-10 VITALS
OXYGEN SATURATION: 99 % | DIASTOLIC BLOOD PRESSURE: 63 MMHG | TEMPERATURE: 96.8 F | HEART RATE: 80 BPM | SYSTOLIC BLOOD PRESSURE: 114 MMHG | RESPIRATION RATE: 20 BRPM

## 2024-02-10 DIAGNOSIS — N30.00 ACUTE CYSTITIS WITHOUT HEMATURIA: Primary | ICD-10-CM

## 2024-02-10 LAB
BILIRUB UR STRIP.AUTO-MCNC: NEGATIVE MG/DL
CHARACTER UR: CLEAR
COLOR: YELLOW
GLUCOSE UR QL STRIP.AUTO: 100 MG/DL
KETONES UR QL STRIP.AUTO: NEGATIVE
NITRITE UR QL STRIP.AUTO: NEGATIVE
PH UR STRIP.AUTO: 8.5 [PH] (ref 5–9)
PROT UR STRIP.AUTO-MCNC: 100 MG/DL
RBC #/AREA URNS HPF: NEGATIVE /[HPF]
SP GR UR STRIP.AUTO: 1.01 (ref 1–1.03)
UROBILINOGEN, URINE: 0.2 EU/DL (ref 0.2–1)
WBC #/AREA URNS HPF: ABNORMAL /[HPF]

## 2024-02-10 PROCEDURE — 99214 OFFICE O/P EST MOD 30 MIN: CPT

## 2024-02-10 PROCEDURE — 99213 OFFICE O/P EST LOW 20 MIN: CPT

## 2024-02-10 PROCEDURE — 81003 URINALYSIS AUTO W/O SCOPE: CPT

## 2024-02-10 PROCEDURE — 87086 URINE CULTURE/COLONY COUNT: CPT

## 2024-02-10 RX ORDER — NITROFURANTOIN 25; 75 MG/1; MG/1
100 CAPSULE ORAL 2 TIMES DAILY
Qty: 14 CAPSULE | Refills: 0 | Status: SHIPPED | OUTPATIENT
Start: 2024-02-10 | End: 2024-02-17

## 2024-02-10 ASSESSMENT — ENCOUNTER SYMPTOMS
COUGH: 0
SINUS PAIN: 0
SINUS PRESSURE: 0
ABDOMINAL PAIN: 0
VOMITING: 0
DIARRHEA: 0
WHEEZING: 0
SHORTNESS OF BREATH: 0
NAUSEA: 0

## 2024-02-10 NOTE — ED PROVIDER NOTES
[facility] URGENT CARE  Urgent Care Encounter      CHIEF COMPLAINT       Chief Complaint   Patient presents with    Urinary Frequency    Abdominal Pain       Nurses Notes reviewed and I agree except as noted in the HPI.  HISTORY OF PRESENT ILLNESS   Deloris Watts is a 68 y.o. female who presents to urgent care with complaints of lower abdominal discomfort and urinary frequency and urgency.  Patient reports she was recently treated for urinary tract infection and reports her symptoms have returned.  Patient denies fevers, bleeding with urination, incontinence.  Patient reports she did complete her last round of antibiotics.    REVIEW OF SYSTEMS     Review of Systems   Constitutional:  Negative for fatigue and fever.   HENT:  Negative for congestion, dental problem, sinus pressure and sinus pain.    Respiratory:  Negative for cough, shortness of breath and wheezing.    Cardiovascular:  Negative for chest pain.   Gastrointestinal:  Negative for abdominal pain, diarrhea, nausea and vomiting.   Genitourinary:  Positive for decreased urine volume, dyspareunia, dysuria and urgency. Negative for vaginal discharge and vaginal pain.   Neurological:  Negative for dizziness, seizures, numbness and headaches.       PAST MEDICAL HISTORY         Diagnosis Date    Allergic rhinitis     Anemia in CKD (chronic kidney disease)     Anxiety     CHF (congestive heart failure) ([facility])     Closed fracture of right proximal humerus 09/18/2021    ORIF    Closed right ankle fracture     In 1990s; treated with casting    CVA (cerebral infarction)     in 1990s ; with left-sided weakness    Depression     Fibromyalgia     in 1990s    Headache(784.0)     Hemiplegia and hemiparesis following cerebral infarction affecting left non-dominant side ([facility])     in 1990s    Hydronephrosis 09/01/2023    Urogenital Implants, calculus of ureter, UTI    Hyperlipidemia     Hypertension     in 1980s    Irritable bowel syndrome     in 1990s    Kidney

## 2024-02-10 NOTE — ED TRIAGE NOTES
Pt to UC with c/o dysuria and abdominal pain ongoing x 2-3 weeks. Pt finished an ATB for uti 10 days ago but symptoms returned. Pt is a dialysis pt.

## 2024-02-11 LAB — BACTERIA UR CULT: NORMAL

## 2024-02-12 ENCOUNTER — TELEPHONE (OUTPATIENT)
Dept: FAMILY MEDICINE CLINIC | Age: 69
End: 2024-02-12

## 2024-02-12 LAB — BACTERIA UR CULT: NORMAL

## 2024-02-13 ENCOUNTER — HOSPITAL ENCOUNTER (EMERGENCY)
Age: 69
Discharge: HOME | End: 2024-02-13
Attending: EMERGENCY MEDICINE
Payer: MEDICARE

## 2024-02-13 ENCOUNTER — APPOINTMENT (OUTPATIENT)
Dept: GENERAL RADIOLOGY | Age: 69
End: 2024-02-13
Payer: MEDICARE

## 2024-02-13 ENCOUNTER — APPOINTMENT (OUTPATIENT)
Dept: CT IMAGING | Age: 69
End: 2024-02-13
Payer: MEDICARE

## 2024-02-13 VITALS
HEART RATE: 79 BPM | BODY MASS INDEX: 19.63 KG/M2 | RESPIRATION RATE: 13 BRPM | OXYGEN SATURATION: 99 % | TEMPERATURE: 98.3 F | WEIGHT: 100 LBS | DIASTOLIC BLOOD PRESSURE: 69 MMHG | HEIGHT: 60 IN | SYSTOLIC BLOOD PRESSURE: 155 MMHG

## 2024-02-13 DIAGNOSIS — R11.2 NAUSEA AND VOMITING, UNSPECIFIED VOMITING TYPE: Primary | ICD-10-CM

## 2024-02-13 DIAGNOSIS — E86.0 DEHYDRATION: ICD-10-CM

## 2024-02-13 LAB
ALBUMIN SERPL BCG-MCNC: 4.5 G/DL (ref 3.5–5.1)
ALP SERPL-CCNC: 124 U/L (ref 38–126)
ALT SERPL W/O P-5'-P-CCNC: 10 U/L (ref 11–66)
ANION GAP SERPL CALC-SCNC: 21 MEQ/L (ref 8–16)
ANISOCYTOSIS BLD QL SMEAR: PRESENT
APAP SERPL-MCNC: 17.6 UG/ML (ref 0–20)
AST SERPL-CCNC: 17 U/L (ref 5–40)
B-OH-BUTYR SERPL-MSCNC: 8.09 MG/DL (ref 0.2–2.81)
BACTERIA URNS QL MICRO: ABNORMAL /HPF
BASE EXCESS BLDA CALC-SCNC: -5.9 MMOL/L (ref -2–3)
BASOPHILS ABSOLUTE: 0 THOU/MM3 (ref 0–0.1)
BASOPHILS NFR BLD AUTO: 1 %
BILIRUB CONJ SERPL-MCNC: < 0.2 MG/DL (ref 0–0.3)
BILIRUB SERPL-MCNC: 0.4 MG/DL (ref 0.3–1.2)
BILIRUB UR QL STRIP.AUTO: NEGATIVE
BUN SERPL-MCNC: 53 MG/DL (ref 7–22)
CALCIUM SERPL-MCNC: 9.1 MG/DL (ref 8.5–10.5)
CASTS #/AREA URNS LPF: ABNORMAL /LPF
CASTS 2: ABNORMAL /LPF
CHARACTER UR: CLEAR
CHLORIDE SERPL-SCNC: 102 MEQ/L (ref 98–111)
CO2 SERPL-SCNC: 19 MEQ/L (ref 23–33)
COLLECTED BY:: ABNORMAL
COLOR: YELLOW
CREAT SERPL-MCNC: 8.1 MG/DL (ref 0.4–1.2)
CRYSTALS URNS MICRO: ABNORMAL
DEPRECATED RDW RBC AUTO: 67.1 FL (ref 35–45)
EOSINOPHIL NFR BLD AUTO: 1.5 %
EOSINOPHILS ABSOLUTE: 0.1 THOU/MM3 (ref 0–0.4)
EPITHELIAL CELLS, UA: ABNORMAL /HPF
ERYTHROCYTE [DISTWIDTH] IN BLOOD BY AUTOMATED COUNT: 17.7 % (ref 11.5–14.5)
FLUAV RNA RESP QL NAA+PROBE: NOT DETECTED
FLUBV RNA RESP QL NAA+PROBE: NOT DETECTED
GFR SERPL CREATININE-BSD FRML MDRD: 5 ML/MIN/1.73M2
GLUCOSE SERPL-MCNC: 88 MG/DL (ref 70–108)
GLUCOSE UR QL STRIP.AUTO: 250 MG/DL
HCO3 BLDA-SCNC: 20 MMOL/L (ref 23–28)
HCT VFR BLD AUTO: 34.7 % (ref 37–47)
HGB BLD-MCNC: 10.6 GM/DL (ref 12–16)
HGB UR QL STRIP.AUTO: NEGATIVE
IMM GRANULOCYTES # BLD AUTO: 0.03 THOU/MM3 (ref 0–0.07)
IMM GRANULOCYTES NFR BLD AUTO: 0.8 %
LACTIC ACID, SEPSIS: 0.9 MMOL/L (ref 0.5–1.9)
LIPASE SERPL-CCNC: 25.9 U/L (ref 5.6–51.3)
LYMPHOCYTES ABSOLUTE: 1.3 THOU/MM3 (ref 1–4.8)
LYMPHOCYTES NFR BLD AUTO: 33.8 %
MCH RBC QN AUTO: 31.1 PG (ref 26–33)
MCHC RBC AUTO-ENTMCNC: 30.5 GM/DL (ref 32.2–35.5)
MCV RBC AUTO: 101.8 FL (ref 81–99)
MISCELLANEOUS 2: ABNORMAL
MONOCYTES ABSOLUTE: 0.4 THOU/MM3 (ref 0.4–1.3)
MONOCYTES NFR BLD AUTO: 9 %
NEUTROPHILS NFR BLD AUTO: 53.9 %
NITRITE UR QL STRIP: NEGATIVE
NRBC BLD AUTO-RTO: 0 /100 WBC
OSMOLALITY SERPL CALC.SUM OF ELEC: 296.9 MOSMOL/KG (ref 275–300)
PCO2 TEMP ADJ BLDMV: 38 MMHG (ref 41–51)
PH BLDMV: 7.32 [PH] (ref 7.31–7.41)
PH UR STRIP.AUTO: 7.5 [PH] (ref 5–9)
PLATELET # BLD AUTO: 210 THOU/MM3 (ref 130–400)
PLATELET BLD QL SMEAR: ADEQUATE
PMV BLD AUTO: 9.6 FL (ref 9.4–12.4)
PO2 BLDMV: 39 MMHG (ref 25–40)
POTASSIUM SERPL-SCNC: 4 MEQ/L (ref 3.5–5.2)
PROT SERPL-MCNC: 6.6 G/DL (ref 6.1–8)
PROT UR STRIP.AUTO-MCNC: 30 MG/DL
RBC # BLD AUTO: 3.41 MILL/MM3 (ref 4.2–5.4)
RBC URINE: ABNORMAL /HPF
RENAL EPI CELLS #/AREA URNS HPF: ABNORMAL /[HPF]
SALICYLATES SERPL-MCNC: < 0.3 MG/DL (ref 2–10)
SAO2 % BLDMV: 69 %
SARS-COV-2 RNA RESP QL NAA+PROBE: NOT DETECTED
SCAN OF BLOOD SMEAR: NORMAL
SEGMENTED NEUTROPHILS ABSOLUTE COUNT: 2.1 THOU/MM3 (ref 1.8–7.7)
SODIUM SERPL-SCNC: 142 MEQ/L (ref 135–145)
SP GR UR REFRACT.AUTO: 1.01 (ref 1–1.03)
TROPONIN, HIGH SENSITIVITY: 54 NG/L (ref 0–12)
TROPONIN, HIGH SENSITIVITY: 58 NG/L (ref 0–12)
UROBILINOGEN, URINE: 0.2 EU/DL (ref 0–1)
WBC # BLD AUTO: 3.9 THOU/MM3 (ref 4.8–10.8)
WBC #/AREA URNS HPF: ABNORMAL /HPF
WBC #/AREA URNS HPF: NEGATIVE /[HPF]
YEAST LIKE FUNGI URNS QL MICRO: ABNORMAL

## 2024-02-13 PROCEDURE — 36415 COLL VENOUS BLD VENIPUNCTURE: CPT

## 2024-02-13 PROCEDURE — 83690 ASSAY OF LIPASE: CPT

## 2024-02-13 PROCEDURE — 74177 CT ABD & PELVIS W/CONTRAST: CPT

## 2024-02-13 PROCEDURE — 80053 COMPREHEN METABOLIC PANEL: CPT

## 2024-02-13 PROCEDURE — 6370000000 HC RX 637 (ALT 250 FOR IP): Performed by: STUDENT IN AN ORGANIZED HEALTH CARE EDUCATION/TRAINING PROGRAM

## 2024-02-13 PROCEDURE — 96361 HYDRATE IV INFUSION ADD-ON: CPT

## 2024-02-13 PROCEDURE — 99285 EMERGENCY DEPT VISIT HI MDM: CPT

## 2024-02-13 PROCEDURE — 96374 THER/PROPH/DIAG INJ IV PUSH: CPT

## 2024-02-13 PROCEDURE — 83605 ASSAY OF LACTIC ACID: CPT

## 2024-02-13 PROCEDURE — 87636 SARSCOV2 & INF A&B AMP PRB: CPT

## 2024-02-13 PROCEDURE — 82803 BLOOD GASES ANY COMBINATION: CPT

## 2024-02-13 PROCEDURE — 81001 URINALYSIS AUTO W/SCOPE: CPT

## 2024-02-13 PROCEDURE — 71045 X-RAY EXAM CHEST 1 VIEW: CPT

## 2024-02-13 PROCEDURE — 80143 DRUG ASSAY ACETAMINOPHEN: CPT

## 2024-02-13 PROCEDURE — 80179 DRUG ASSAY SALICYLATE: CPT

## 2024-02-13 PROCEDURE — 6360000002 HC RX W HCPCS: Performed by: STUDENT IN AN ORGANIZED HEALTH CARE EDUCATION/TRAINING PROGRAM

## 2024-02-13 PROCEDURE — 82010 KETONE BODYS QUAN: CPT

## 2024-02-13 PROCEDURE — 2580000003 HC RX 258: Performed by: STUDENT IN AN ORGANIZED HEALTH CARE EDUCATION/TRAINING PROGRAM

## 2024-02-13 PROCEDURE — 93005 ELECTROCARDIOGRAM TRACING: CPT | Performed by: STUDENT IN AN ORGANIZED HEALTH CARE EDUCATION/TRAINING PROGRAM

## 2024-02-13 PROCEDURE — 6360000004 HC RX CONTRAST MEDICATION: Performed by: STUDENT IN AN ORGANIZED HEALTH CARE EDUCATION/TRAINING PROGRAM

## 2024-02-13 PROCEDURE — 82248 BILIRUBIN DIRECT: CPT

## 2024-02-13 PROCEDURE — 84484 ASSAY OF TROPONIN QUANT: CPT

## 2024-02-13 PROCEDURE — 85025 COMPLETE CBC W/AUTO DIFF WBC: CPT

## 2024-02-13 RX ORDER — ONDANSETRON 2 MG/ML
4 INJECTION INTRAMUSCULAR; INTRAVENOUS ONCE
Status: COMPLETED | OUTPATIENT
Start: 2024-02-13 | End: 2024-02-13

## 2024-02-13 RX ORDER — ONDANSETRON 4 MG/1
4 TABLET, FILM COATED ORAL EVERY 8 HOURS PRN
Qty: 6 TABLET | Refills: 0 | Status: SHIPPED | OUTPATIENT
Start: 2024-02-13

## 2024-02-13 RX ORDER — 0.9 % SODIUM CHLORIDE 0.9 %
1000 INTRAVENOUS SOLUTION INTRAVENOUS ONCE
Status: COMPLETED | OUTPATIENT
Start: 2024-02-13 | End: 2024-02-13

## 2024-02-13 RX ORDER — ACETAMINOPHEN 500 MG
1000 TABLET ORAL ONCE
Status: COMPLETED | OUTPATIENT
Start: 2024-02-13 | End: 2024-02-13

## 2024-02-13 RX ADMIN — SODIUM CHLORIDE 1000 ML: 9 INJECTION, SOLUTION INTRAVENOUS at 18:07

## 2024-02-13 RX ADMIN — ONDANSETRON 4 MG: 2 INJECTION INTRAMUSCULAR; INTRAVENOUS at 18:06

## 2024-02-13 RX ADMIN — IOPAMIDOL 80 ML: 755 INJECTION, SOLUTION INTRAVENOUS at 19:40

## 2024-02-13 RX ADMIN — ACETAMINOPHEN 1000 MG: 500 TABLET ORAL at 18:06

## 2024-02-13 NOTE — ED PROVIDER NOTES
PATIENT NAME: Deloris Watts  MRN: [id number]  : 1955  TUTTLE: 2024    I performed a history and physical examination of the patient and discussed management with the Resident. I reviewed the Resident's note and agree with the documented findings and plan of care. Any areas of disagreement are noted on the chart. I was personally present for the key portions of any procedures and have documented in the chart those procedures where I was not present during the key portions. I have reviewed the emergency nurses triage note and agree with the chief complaint, past medical history, past surgical history, allergies, medications, social and family history as documented unless otherwise noted below.    MEDICAL DEDISION MAKING (MDM)     Deloris Watts is a 68 y.o. female who presents to Emergency Department with Emesis and Urinary Frequency     Deloris Watts is a 68 y.o. female with past medical history of CVA, hypertension, GABBY, CHF, hyperlipidemia, ESRD, depression who presents to the emergency department for evaluation of nausea, vomiting, and dysuria.  She is prescribed nitrofurantoin on 2/10/2024 for suspected UTI by urgent care provider.     Of note, she missed 2 last hemodialysis.    EKG interpreted by me as normal sinus rhythm, ventricular rate of 77 bpm, IN interval 148 ms, QRS duration 128 ms, QTc 448 ms, no acute ischemic changes.    Her ED workups are reassuring.  No hyperkalemia, no fluid overload, no hypertensive emergency.  No indication for immediate hemodialysis.  Urine culture 2/10/2024 showing no growth.  Nephrology was consulted, agreeing to schedule HD tomorrow.  Discharged in stable conditions.      Vitals:    24 1820 24 1920 24   BP: (!) 148/110 (!) 153/69 133/85 (!) 155/69   Pulse: 76 81 78 79   Resp: 16 14 14 13   Temp:       TempSrc:       SpO2: 95% 99% 100% 99%   Weight:       Height:         Labs Reviewed   BASIC METABOLIC PANEL - Abnormal; Notable 
failure     Chronic Kidney Disease, Renal Dialysis started in 1/2023    Osteoporosis 2019    Unspecified cerebral artery occlusion with cerebral infarction     Unspecified sleep apnea     Wrist fracture, right 2018    Treated with casting     Past Surgical History:   Procedure Laterality Date    CARPAL TUNNEL RELEASE Right     in 1990s    COLONOSCOPY  2012    [facility]    CT BIOPSY RENAL  12/28/2022    CT BIOPSY RENAL 12/28/2022 [facility] CT SCAN    CYSTOSCOPY Left 08/29/2023    CYSTOSCOPY, LEFT URETERAL STENT PLACEMENT performed by Edilson Whalen MD at [facility] OR    ENDOSCOPY, COLON, DIAGNOSTIC  unsure    HEMORRHOID SURGERY  unsure    HUMERUS FRACTURE SURGERY Right 2021    ORIF    HYSTERECTOMY (CERVIX STATUS UNKNOWN)      age 38    LASIK      lasik    NECK SURGERY  18  years ago    cervical spine fusion ; in 1990s    OVARY REMOVAL Bilateral     age 38    SINUS SURGERY  10 years ago    SPINE SURGERY N/A 12/18/2023    KYPHOPLASTY L2 performed by Nimisha Garza MD at [facility] OR    TUBAL LIGATION      URETER SURGERY N/A 09/20/2023    CYSTOSCOPY, LEFT  URETEROSCOPY, LASER LITHOTRIPSIE OF STONES performed by Edilson Whalen MD at [facility] OR         MEDICATIONS   No current facility-administered medications for this encounter.    Current Outpatient Medications:     ondansetron (ZOFRAN) 4 MG tablet, Take 1 tablet by mouth every 8 hours as needed for Nausea or Vomiting, Disp: 6 tablet, Rfl: 0    nitrofurantoin, macrocrystal-monohydrate, (MACROBID) 100 MG capsule, Take 1 capsule by mouth 2 times daily for 7 days, Disp: 14 capsule, Rfl: 0    buPROPion (WELLBUTRIN XL) 150 MG extended release tablet, Take with 300 mg daily for 450 mg daily., Disp: 90 tablet, Rfl: 1    buPROPion (WELLBUTRIN XL) 300 MG extended release tablet, Take with 150 mg for 450 mg daily., Disp: 90 tablet, Rfl: 1    traZODone (DESYREL) 50 MG tablet, TAKE 1 TABLET NIGHTLY, Disp: 90 tablet, Rfl: 1    Handicap Placard MISC, by Does not apply route Expires in 5 years. Dx:, Disp: 1 each,

## 2024-02-13 NOTE — ED TRIAGE NOTES
Pt presents to ED with chief complaint of emesis and urinary frequency. Pt states she had to miss her last dialysis appt because she has been vomiting. States she was prescribed antibiotics for a UTI on Saturday, but is still having frequent urination. Reports lower abdominal / back pain.

## 2024-02-14 ENCOUNTER — TELEPHONE (OUTPATIENT)
Dept: FAMILY MEDICINE CLINIC | Age: 69
End: 2024-02-14

## 2024-02-14 LAB
EKG ATRIAL RATE: 77 BPM
EKG P AXIS: 68 DEGREES
EKG P-R INTERVAL: 148 MS
EKG Q-T INTERVAL: 396 MS
EKG QRS DURATION: 128 MS
EKG QTC CALCULATION (BAZETT): 448 MS
EKG R AXIS: 77 DEGREES
EKG T AXIS: 40 DEGREES
EKG VENTRICULAR RATE: 77 BPM

## 2024-02-14 PROCEDURE — 93010 ELECTROCARDIOGRAM REPORT: CPT | Performed by: INTERNAL MEDICINE

## 2024-02-14 NOTE — DISCHARGE INSTRUCTIONS
If given narcotics (opiates) during this Emergency Department visit, please do not drink, drive or operate any machinery for at least 4 - 6 hours.    Avoid eating any spicy food, milk type products or drinks that have caffeine in it.  Take all medications as prescribed.  For pain use ibuprofen (Motrin) or acetaminophen (Tylenol), unless prescribed medications that have acetaminophen in it.  You can take over the counter acetaminophen tablets (1 - 2 tablets of the 500-mg strength every 6 hours) or ibuprofen tablets (2 tablets every 4 hours).    PLEASE RETURN TO THE EMERGENCY DEPARTMENT IMMEDIATELY for worsening symptoms, or if you develop any concerning symptoms such as: high fever not relieved by acetaminophen (Tylenol) and/or ibuprofen (Motrin), chills, shortness of breath, chest pain, persistent nausea and/or vomiting, numbness, weakness or tingling in the arms or legs or change in color of the extremities, changes in mental status, persistent headache, blurry vision.    Return within 8 - 12 hours if you have any of the following: worsening of pain in your abdomen, no food sounds good to you, you continue to vomit, pain goes to your back, have pain in the abdomen when going over a bump in the car or when you jump up and down, develop vaginal bleeding or discharge, inability to urinate, unable to follow up with your physician, or other any other care or concern.    
Quality 130: Documentation Of Current Medications In The Medical Record: Current Medications Documented
Detail Level: Detailed
Quality 110: Preventive Care And Screening: Influenza Immunization: Influenza Immunization not Administered due to Patient Allergy.
Quality 431: Preventive Care And Screening: Unhealthy Alcohol Use - Screening: Patient not identified as an unhealthy alcohol user when screened for unhealthy alcohol use using a systematic screening method
Quality 226: Preventive Care And Screening: Tobacco Use: Screening And Cessation Intervention: Patient screened for tobacco use, is a smoker AND received Cessation Counseling within measurement period or in the six months prior to the measurement period

## 2024-02-14 NOTE — ED NOTES
Bedside shift report received from FLY Walker. Patient assisted to restroom and returned to bed at this time. Urine sample collected and sent to lab. Patient resting in bed. Respirations easy and unlabored. No distress noted. Call light within reach.

## 2024-02-14 NOTE — ED NOTES
Patient resting in bed. Respirations easy and unlabored. No distress noted. Call light within reach.  at the bedside.

## 2024-02-14 NOTE — PROGRESS NOTES
[facility]  [address]  [place] [address]  Phone:  [phone]          Name: Deloris Watts  : 1955    Chief Complaint   Patient presents with    Follow-up       HPI:     Deloris Watts is a 68 y.o. female who presents today for follow up of hypertension, anxiety, depression, and insomnia.  She has a lot going on and is on HD M/W/F.  She was in the ER on  with nausea, vomiting, and dysuria for 3 days.  Her UA was negative for infection.  Her other labs and imaging were unremarkable with the exception of her ESRD and her CT abdomen/pelvis did showed large stool in the right hemicolon.  She states that her BMs are small and round every 2-3 days.  Her  states that her appetite is poor and she hasn't eaten today.She did make it to dialysis the past few appointments.  No recent falls.  She has been ambulating with her walker everywhere she goes.  She doesn't have much energy.    Lab Results   Component Value Date     2024    K 4.0 2024     2024    CO2 19 (L) 2024    BUN 53 (H) 2024    CREATININE 8.1 (HH) 2024    GLUCOSE 88 2024    CALCIUM 9.1 2024    PROT 6.6 2024    LABALBU 4.5 2024    BILITOT 0.4 2024    ALKPHOS 124 2024    AST 17 2024    ALT 10 (L) 2024    LABGLOM 5 (A) 2024     Lab Results   Component Value Date    CHOL 149 2023    TRIG 53 2023    HDL 92 2023    LDLCALC 46 2023    LDLDIRECT 73 2017     Lab Results   Component Value Date    ALT 10 (L) 2024    AST 17 2024          Current Outpatient Medications:     montelukast (SINGULAIR) 10 MG tablet, Take 1 tablet by mouth daily, Disp: 90 tablet, Rfl: 3    clopidogrel (PLAVIX) 75 MG tablet, Take 1 tablet by mouth daily, Disp: 90 tablet, Rfl: 1    amLODIPine (NORVASC) 5 MG tablet, Take 1 tablet by mouth daily, Disp: 90 tablet, Rfl: 3    ondansetron (ZOFRAN) 4 MG tablet, Take 1

## 2024-02-14 NOTE — ED NOTES
ED nurse-to-nurse bedside report    Chief Complaint   Patient presents with    Emesis    Urinary Frequency      LOC: alert and orientated to name, place, date  Vital signs   Vitals:    02/13/24 1659 02/13/24 1820   BP: 128/69 (!) 148/110   Pulse: 75 76   Resp: 16 16   Temp: 98.3 °F (36.8 °C)    TempSrc: Oral    SpO2: 94% 95%   Weight: 45.4 kg (100 lb)    Height: 1.524 m (5')       Pain:    Pain Interventions: see MAR  Pain Goal: 2/10  Oxygen: No    Current needs required RA   Telemetry: Yes  LDAs:   Peripheral IV 02/13/24 Right Antecubital (Active)   Site Assessment Clean, dry & intact 02/13/24 1718   Line Status Blood return noted;Flushed;Specimen collected 02/13/24 1718   Phlebitis Assessment No symptoms 02/13/24 1718   Infiltration Assessment 0 02/13/24 1718   Dressing Status Clean, dry & intact 02/13/24 1718     Continuous Infusions:   Mobility: Requires assistance * 1  Butler Fall Risk Score:       8/17/2023     1:35 PM 5/17/2023    11:01 AM 4/12/2022    11:31 AM 3/10/2022     8:03 AM 1/21/2021    11:05 AM 9/9/2019     4:01 PM 6/6/2019     9:14 AM   Fall Risk   2 or more falls in past year? yes no no no no no no   Fall with injury in past year? no yes yes yes no no no     Fall Interventions: call light in reach, bed in low position with wheels locked, side rails up x2  Report given to: FLY Ledbetter

## 2024-02-20 ENCOUNTER — OFFICE VISIT (OUTPATIENT)
Dept: FAMILY MEDICINE CLINIC | Age: 69
End: 2024-02-20
Payer: MEDICARE

## 2024-02-20 VITALS
DIASTOLIC BLOOD PRESSURE: 80 MMHG | BODY MASS INDEX: 19.01 KG/M2 | HEIGHT: 60 IN | HEART RATE: 80 BPM | TEMPERATURE: 98.5 F | RESPIRATION RATE: 16 BRPM | WEIGHT: 96.8 LBS | SYSTOLIC BLOOD PRESSURE: 124 MMHG

## 2024-02-20 DIAGNOSIS — I63.00 CEREBRAL INFARCTION DUE TO THROMBOSIS OF PRECEREBRAL ARTERY (HCC): Chronic | ICD-10-CM

## 2024-02-20 DIAGNOSIS — J30.1 ALLERGIC RHINITIS DUE TO POLLEN, UNSPECIFIED SEASONALITY: ICD-10-CM

## 2024-02-20 DIAGNOSIS — F41.9 ANXIETY: ICD-10-CM

## 2024-02-20 DIAGNOSIS — F33.42 RECURRENT MAJOR DEPRESSIVE DISORDER, IN FULL REMISSION (HCC): ICD-10-CM

## 2024-02-20 DIAGNOSIS — F51.04 PSYCHOPHYSIOLOGICAL INSOMNIA: ICD-10-CM

## 2024-02-20 DIAGNOSIS — I10 ESSENTIAL HYPERTENSION: Primary | ICD-10-CM

## 2024-02-20 DIAGNOSIS — N18.6 END STAGE RENAL DISEASE (HCC): ICD-10-CM

## 2024-02-20 PROCEDURE — G8427 DOCREV CUR MEDS BY ELIG CLIN: HCPCS | Performed by: FAMILY MEDICINE

## 2024-02-20 PROCEDURE — 3017F COLORECTAL CA SCREEN DOC REV: CPT | Performed by: FAMILY MEDICINE

## 2024-02-20 PROCEDURE — G8420 CALC BMI NORM PARAMETERS: HCPCS | Performed by: FAMILY MEDICINE

## 2024-02-20 PROCEDURE — 99214 OFFICE O/P EST MOD 30 MIN: CPT | Performed by: FAMILY MEDICINE

## 2024-02-20 PROCEDURE — 3079F DIAST BP 80-89 MM HG: CPT | Performed by: FAMILY MEDICINE

## 2024-02-20 PROCEDURE — 1123F ACP DISCUSS/DSCN MKR DOCD: CPT | Performed by: FAMILY MEDICINE

## 2024-02-20 PROCEDURE — 1036F TOBACCO NON-USER: CPT | Performed by: FAMILY MEDICINE

## 2024-02-20 PROCEDURE — 1090F PRES/ABSN URINE INCON ASSESS: CPT | Performed by: FAMILY MEDICINE

## 2024-02-20 PROCEDURE — 3074F SYST BP LT 130 MM HG: CPT | Performed by: FAMILY MEDICINE

## 2024-02-20 PROCEDURE — G8484 FLU IMMUNIZE NO ADMIN: HCPCS | Performed by: FAMILY MEDICINE

## 2024-02-20 PROCEDURE — G8399 PT W/DXA RESULTS DOCUMENT: HCPCS | Performed by: FAMILY MEDICINE

## 2024-02-20 RX ORDER — TRAZODONE HYDROCHLORIDE 50 MG/1
75 TABLET ORAL NIGHTLY
Qty: 90 TABLET | Refills: 1
Start: 2024-02-20

## 2024-02-20 RX ORDER — AMLODIPINE BESYLATE 5 MG/1
5 TABLET ORAL DAILY
Qty: 90 TABLET | Refills: 3 | Status: SHIPPED | OUTPATIENT
Start: 2024-02-20

## 2024-02-20 RX ORDER — CLOPIDOGREL BISULFATE 75 MG/1
75 TABLET ORAL DAILY
Qty: 90 TABLET | Refills: 1 | Status: SHIPPED | OUTPATIENT
Start: 2024-02-20

## 2024-02-20 RX ORDER — MONTELUKAST SODIUM 10 MG/1
10 TABLET ORAL DAILY
Qty: 90 TABLET | Refills: 3 | Status: SHIPPED | OUTPATIENT
Start: 2024-02-20

## 2024-02-20 SDOH — ECONOMIC STABILITY: FOOD INSECURITY: WITHIN THE PAST 12 MONTHS, THE FOOD YOU BOUGHT JUST DIDN'T LAST AND YOU DIDN'T HAVE MONEY TO GET MORE.: NEVER TRUE

## 2024-02-20 SDOH — ECONOMIC STABILITY: INCOME INSECURITY: HOW HARD IS IT FOR YOU TO PAY FOR THE VERY BASICS LIKE FOOD, HOUSING, MEDICAL CARE, AND HEATING?: NOT HARD AT ALL

## 2024-02-20 SDOH — ECONOMIC STABILITY: FOOD INSECURITY: WITHIN THE PAST 12 MONTHS, YOU WORRIED THAT YOUR FOOD WOULD RUN OUT BEFORE YOU GOT MONEY TO BUY MORE.: NEVER TRUE

## 2024-02-20 ASSESSMENT — PATIENT HEALTH QUESTIONNAIRE - PHQ9
7. TROUBLE CONCENTRATING ON THINGS, SUCH AS READING THE NEWSPAPER OR WATCHING TELEVISION: 0
5. POOR APPETITE OR OVEREATING: 1
8. MOVING OR SPEAKING SO SLOWLY THAT OTHER PEOPLE COULD HAVE NOTICED. OR THE OPPOSITE, BEING SO FIGETY OR RESTLESS THAT YOU HAVE BEEN MOVING AROUND A LOT MORE THAN USUAL: 0
SUM OF ALL RESPONSES TO PHQ QUESTIONS 1-9: 5
10. IF YOU CHECKED OFF ANY PROBLEMS, HOW DIFFICULT HAVE THESE PROBLEMS MADE IT FOR YOU TO DO YOUR WORK, TAKE CARE OF THINGS AT HOME, OR GET ALONG WITH OTHER PEOPLE: 0
SUM OF ALL RESPONSES TO PHQ QUESTIONS 1-9: 5
2. FEELING DOWN, DEPRESSED OR HOPELESS: 1
9. THOUGHTS THAT YOU WOULD BE BETTER OFF DEAD, OR OF HURTING YOURSELF: 0
4. FEELING TIRED OR HAVING LITTLE ENERGY: 1
3. TROUBLE FALLING OR STAYING ASLEEP: 1
6. FEELING BAD ABOUT YOURSELF - OR THAT YOU ARE A FAILURE OR HAVE LET YOURSELF OR YOUR FAMILY DOWN: 1

## 2024-02-22 ENCOUNTER — OFFICE VISIT (OUTPATIENT)
Dept: UROLOGY | Age: 69
End: 2024-02-22
Payer: MEDICARE

## 2024-02-22 VITALS — HEIGHT: 60 IN | WEIGHT: 103.6 LBS | TEMPERATURE: 98.7 F | BODY MASS INDEX: 20.34 KG/M2

## 2024-02-22 DIAGNOSIS — N20.0 KIDNEY STONE: Primary | ICD-10-CM

## 2024-02-22 DIAGNOSIS — R35.0 URINARY FREQUENCY: ICD-10-CM

## 2024-02-22 DIAGNOSIS — R31.29 MICROHEMATURIA: ICD-10-CM

## 2024-02-22 LAB
BILIRUBIN URINE: NEGATIVE
BLOOD URINE, POC: ABNORMAL
CHARACTER, URINE: CLEAR
COLOR, URINE: YELLOW
GLUCOSE URINE: 100 MG/DL
KETONES, URINE: NEGATIVE
LEUKOCYTE CLUMPS, URINE: ABNORMAL
NITRITE, URINE: NEGATIVE
PH, URINE: 8.5 (ref 5–9)
PROTEIN, URINE: 100 MG/DL
SPECIFIC GRAVITY, URINE: 1.02 (ref 1–1.03)
UROBILINOGEN, URINE: 0.2 EU/DL (ref 0–1)

## 2024-02-22 PROCEDURE — 1036F TOBACCO NON-USER: CPT | Performed by: NURSE PRACTITIONER

## 2024-02-22 PROCEDURE — G8427 DOCREV CUR MEDS BY ELIG CLIN: HCPCS | Performed by: NURSE PRACTITIONER

## 2024-02-22 PROCEDURE — 3017F COLORECTAL CA SCREEN DOC REV: CPT | Performed by: NURSE PRACTITIONER

## 2024-02-22 PROCEDURE — 99214 OFFICE O/P EST MOD 30 MIN: CPT | Performed by: NURSE PRACTITIONER

## 2024-02-22 PROCEDURE — 1090F PRES/ABSN URINE INCON ASSESS: CPT | Performed by: NURSE PRACTITIONER

## 2024-02-22 PROCEDURE — G8420 CALC BMI NORM PARAMETERS: HCPCS | Performed by: NURSE PRACTITIONER

## 2024-02-22 PROCEDURE — 81003 URINALYSIS AUTO W/O SCOPE: CPT | Performed by: NURSE PRACTITIONER

## 2024-02-22 PROCEDURE — G8484 FLU IMMUNIZE NO ADMIN: HCPCS | Performed by: NURSE PRACTITIONER

## 2024-02-22 PROCEDURE — 1123F ACP DISCUSS/DSCN MKR DOCD: CPT | Performed by: NURSE PRACTITIONER

## 2024-02-22 PROCEDURE — G8399 PT W/DXA RESULTS DOCUMENT: HCPCS | Performed by: NURSE PRACTITIONER

## 2024-02-22 NOTE — PROGRESS NOTES
prior study. Alternatively this could   represent an area of transient hepatic attenuation difference due to   differential vascular dynamics. Otherwise normal liver.  Spleen: Normal.  Pancreas: Normal.  Adrenal glands: Normal.  Gallbladder and biliary ducts: Multiple tiny hyperdensities layering   dependently in the gallbladder consistent with gallstones versus   potentially tiny polyps or areas of cholesterolosis. No gallbladder wall   thickening or inflammatory change.  Kidneys: Native bilateral kidneys are atrophic with cortical thickening   and areas of scarring and calcification along with numerous tiny cysts. No   urinary tract calculus. No findings of hydronephrosis or hydroureter.  Bowel: No bowel distention, mucosal thickening or inflammatory change.   There is no free air. Increased volume of stool throughout the colon   particularly in the sigmoid colon and rectum where the stool is hyperdense   and inspissated in appearance, possibly impacted. Similarly high volume   and hyperdense stool is seen in the proximal transverse colon and cecum   and ascending colon.  Retroperitoneum: No bulky adenopathy. No aneurysmal dilatation of the   abdominal aorta.  Genitourinary: Normal bladder. No free fluid. Uterus and ovaries within   normal limits.  Bones: No suspicious osseous lesions.     IMPRESSION:  1. Large volume of formed stool throughout the colon particularly in the   right hemicolon, sigmoid, and rectum where there is high volume hyperdense   stool suggesting impaction. Findings would be consistent with   constipation.  2. Linear bandlike region of decreased attenuation in the liver, not   significantly changed from 12/15/2023 exam. Findings again may represent   sequela of trauma and liver contusion/ laceration, supported by the   presence of a thin subcapsular fluid collection which would be consistent   with subcapsular hematoma. Liver laceration/ contusion would be expected   to heal or at least

## 2024-02-24 LAB
BACTERIA UR CULT: ABNORMAL
ORGANISM: ABNORMAL

## 2024-02-26 RX ORDER — NITROFURANTOIN 25; 75 MG/1; MG/1
100 CAPSULE ORAL 2 TIMES DAILY
Qty: 20 CAPSULE | Refills: 0 | Status: SHIPPED | OUTPATIENT
Start: 2024-02-26 | End: 2024-03-07

## 2024-03-10 ENCOUNTER — HOSPITAL ENCOUNTER (INPATIENT)
Age: 69
LOS: 3 days | Discharge: NURSING, REHAB OR LONG-TERM CARE | DRG: 521 | End: 2024-03-14
Attending: EMERGENCY MEDICINE | Admitting: ORTHOPAEDIC SURGERY
Payer: MEDICARE

## 2024-03-10 DIAGNOSIS — Z99.2 ESRD ON HEMODIALYSIS (HCC): ICD-10-CM

## 2024-03-10 DIAGNOSIS — S72.012A CLOSED SUBCAPITAL FRACTURE OF FEMUR, LEFT, INITIAL ENCOUNTER (HCC): Primary | ICD-10-CM

## 2024-03-10 DIAGNOSIS — N18.6 ESRD ON HEMODIALYSIS (HCC): ICD-10-CM

## 2024-03-10 PROCEDURE — 93005 ELECTROCARDIOGRAM TRACING: CPT | Performed by: EMERGENCY MEDICINE

## 2024-03-10 PROCEDURE — 6820000001 HC L2 TRAUMA SURGERY EVALUATION: Performed by: ORTHOPAEDIC SURGERY

## 2024-03-10 PROCEDURE — 96374 THER/PROPH/DIAG INJ IV PUSH: CPT

## 2024-03-10 PROCEDURE — 99285 EMERGENCY DEPT VISIT HI MDM: CPT

## 2024-03-10 PROCEDURE — 96375 TX/PRO/DX INJ NEW DRUG ADDON: CPT

## 2024-03-10 ASSESSMENT — PAIN - FUNCTIONAL ASSESSMENT: PAIN_FUNCTIONAL_ASSESSMENT: NONE - DENIES PAIN

## 2024-03-11 ENCOUNTER — APPOINTMENT (OUTPATIENT)
Dept: GENERAL RADIOLOGY | Age: 69
DRG: 521 | End: 2024-03-11
Payer: MEDICARE

## 2024-03-11 ENCOUNTER — ANESTHESIA EVENT (OUTPATIENT)
Dept: OPERATING ROOM | Age: 69
DRG: 521 | End: 2024-03-11
Payer: MEDICARE

## 2024-03-11 ENCOUNTER — APPOINTMENT (OUTPATIENT)
Dept: CT IMAGING | Age: 69
DRG: 521 | End: 2024-03-11
Payer: MEDICARE

## 2024-03-11 ENCOUNTER — ANESTHESIA (OUTPATIENT)
Dept: OPERATING ROOM | Age: 69
DRG: 521 | End: 2024-03-11
Payer: MEDICARE

## 2024-03-11 PROBLEM — S72.002A CLOSED DISPLACED FRACTURE OF LEFT FEMORAL NECK (HCC): Status: ACTIVE | Noted: 2024-03-11

## 2024-03-11 PROBLEM — S72.002A CLOSED FRACTURE OF NECK OF LEFT FEMUR, INITIAL ENCOUNTER (HCC): Status: ACTIVE | Noted: 2024-03-11

## 2024-03-11 LAB
ABO: NORMAL
ALBUMIN SERPL BCG-MCNC: 4.2 G/DL (ref 3.5–5.1)
ALP SERPL-CCNC: 155 U/L (ref 38–126)
ALT SERPL W/O P-5'-P-CCNC: 16 U/L (ref 11–66)
ANION GAP SERPL CALC-SCNC: 14 MEQ/L (ref 8–16)
ANION GAP SERPL CALC-SCNC: 16 MEQ/L (ref 8–16)
ANTIBODY SCREEN: NORMAL
APTT PPP: 28.5 SECONDS (ref 22–38)
AST SERPL-CCNC: 22 U/L (ref 5–40)
BASOPHILS ABSOLUTE: 0.1 THOU/MM3 (ref 0–0.1)
BASOPHILS ABSOLUTE: 0.1 THOU/MM3 (ref 0–0.1)
BASOPHILS NFR BLD AUTO: 0.7 %
BASOPHILS NFR BLD AUTO: 1.2 %
BILIRUB SERPL-MCNC: 0.2 MG/DL (ref 0.3–1.2)
BUN SERPL-MCNC: 55 MG/DL (ref 7–22)
BUN SERPL-MCNC: 57 MG/DL (ref 7–22)
CALCIUM SERPL-MCNC: 9.1 MG/DL (ref 8.5–10.5)
CALCIUM SERPL-MCNC: 9.2 MG/DL (ref 8.5–10.5)
CHLORIDE SERPL-SCNC: 101 MEQ/L (ref 98–111)
CHLORIDE SERPL-SCNC: 98 MEQ/L (ref 98–111)
CO2 SERPL-SCNC: 26 MEQ/L (ref 23–33)
CO2 SERPL-SCNC: 27 MEQ/L (ref 23–33)
CREAT SERPL-MCNC: 6.2 MG/DL (ref 0.4–1.2)
CREAT SERPL-MCNC: 6.3 MG/DL (ref 0.4–1.2)
DEPRECATED RDW RBC AUTO: 48.3 FL (ref 35–45)
DEPRECATED RDW RBC AUTO: 49.2 FL (ref 35–45)
EKG ATRIAL RATE: 75 BPM
EKG P AXIS: 70 DEGREES
EKG P-R INTERVAL: 160 MS
EKG Q-T INTERVAL: 434 MS
EKG QRS DURATION: 130 MS
EKG QTC CALCULATION (BAZETT): 484 MS
EKG R AXIS: 95 DEGREES
EKG T AXIS: 67 DEGREES
EKG VENTRICULAR RATE: 75 BPM
EOSINOPHIL NFR BLD AUTO: 0.1 %
EOSINOPHIL NFR BLD AUTO: 1.9 %
EOSINOPHILS ABSOLUTE: 0 THOU/MM3 (ref 0–0.4)
EOSINOPHILS ABSOLUTE: 0.1 THOU/MM3 (ref 0–0.4)
ERYTHROCYTE [DISTWIDTH] IN BLOOD BY AUTOMATED COUNT: 12.9 % (ref 11.5–14.5)
ERYTHROCYTE [DISTWIDTH] IN BLOOD BY AUTOMATED COUNT: 13 % (ref 11.5–14.5)
GFR SERPL CREATININE-BSD FRML MDRD: 7 ML/MIN/1.73M2
GFR SERPL CREATININE-BSD FRML MDRD: 7 ML/MIN/1.73M2
GLUCOSE BLD STRIP.AUTO-MCNC: 106 MG/DL (ref 70–108)
GLUCOSE BLD STRIP.AUTO-MCNC: 107 MG/DL (ref 70–108)
GLUCOSE BLD STRIP.AUTO-MCNC: 80 MG/DL (ref 70–108)
GLUCOSE BLD STRIP.AUTO-MCNC: 89 MG/DL (ref 70–108)
GLUCOSE SERPL-MCNC: 114 MG/DL (ref 70–108)
GLUCOSE SERPL-MCNC: 93 MG/DL (ref 70–108)
HCT VFR BLD AUTO: 38.9 % (ref 37–47)
HCT VFR BLD AUTO: 40.9 % (ref 37–47)
HGB BLD-MCNC: 12 GM/DL (ref 12–16)
HGB BLD-MCNC: 12.8 GM/DL (ref 12–16)
IMM GRANULOCYTES # BLD AUTO: 0.06 THOU/MM3 (ref 0–0.07)
IMM GRANULOCYTES # BLD AUTO: 0.07 THOU/MM3 (ref 0–0.07)
IMM GRANULOCYTES NFR BLD AUTO: 0.7 %
IMM GRANULOCYTES NFR BLD AUTO: 1 %
INR PPP: 0.77 (ref 0.85–1.13)
LYMPHOCYTES ABSOLUTE: 1 THOU/MM3 (ref 1–4.8)
LYMPHOCYTES ABSOLUTE: 1.6 THOU/MM3 (ref 1–4.8)
LYMPHOCYTES NFR BLD AUTO: 10.8 %
LYMPHOCYTES NFR BLD AUTO: 23.4 %
MCH RBC QN AUTO: 31.6 PG (ref 26–33)
MCH RBC QN AUTO: 31.8 PG (ref 26–33)
MCHC RBC AUTO-ENTMCNC: 30.8 GM/DL (ref 32.2–35.5)
MCHC RBC AUTO-ENTMCNC: 31.3 GM/DL (ref 32.2–35.5)
MCV RBC AUTO: 101.5 FL (ref 81–99)
MCV RBC AUTO: 102.4 FL (ref 81–99)
MONOCYTES ABSOLUTE: 0.5 THOU/MM3 (ref 0.4–1.3)
MONOCYTES ABSOLUTE: 0.8 THOU/MM3 (ref 0.4–1.3)
MONOCYTES NFR BLD AUTO: 7.2 %
MONOCYTES NFR BLD AUTO: 9.4 %
NEUTROPHILS NFR BLD AUTO: 65.3 %
NEUTROPHILS NFR BLD AUTO: 78.3 %
NRBC BLD AUTO-RTO: 0 /100 WBC
NRBC BLD AUTO-RTO: 0 /100 WBC
NT-PROBNP SERPL IA-MCNC: 1198 PG/ML (ref 0–124)
OSMOLALITY SERPL CALC.SUM OF ELEC: 294.2 MOSMOL/KG (ref 275–300)
PLATELET # BLD AUTO: 232 THOU/MM3 (ref 130–400)
PLATELET # BLD AUTO: 269 THOU/MM3 (ref 130–400)
PMV BLD AUTO: 10.8 FL (ref 9.4–12.4)
PMV BLD AUTO: 10.8 FL (ref 9.4–12.4)
POTASSIUM SERPL-SCNC: 4.3 MEQ/L (ref 3.5–5.2)
POTASSIUM SERPL-SCNC: 4.5 MEQ/L (ref 3.5–5.2)
PROT SERPL-MCNC: 6.8 G/DL (ref 6.1–8)
RBC # BLD AUTO: 3.8 MILL/MM3 (ref 4.2–5.4)
RBC # BLD AUTO: 4.03 MILL/MM3 (ref 4.2–5.4)
RH FACTOR: NORMAL
SEGMENTED NEUTROPHILS ABSOLUTE COUNT: 4.5 THOU/MM3 (ref 1.8–7.7)
SEGMENTED NEUTROPHILS ABSOLUTE COUNT: 7 THOU/MM3 (ref 1.8–7.7)
SODIUM SERPL-SCNC: 140 MEQ/L (ref 135–145)
SODIUM SERPL-SCNC: 142 MEQ/L (ref 135–145)
TROPONIN, HIGH SENSITIVITY: 53 NG/L (ref 0–12)
WBC # BLD AUTO: 6.9 THOU/MM3 (ref 4.8–10.8)
WBC # BLD AUTO: 8.9 THOU/MM3 (ref 4.8–10.8)

## 2024-03-11 PROCEDURE — 6370000000 HC RX 637 (ALT 250 FOR IP): Performed by: REGISTERED NURSE

## 2024-03-11 PROCEDURE — 6370000000 HC RX 637 (ALT 250 FOR IP): Performed by: PHYSICIAN ASSISTANT

## 2024-03-11 PROCEDURE — 73502 X-RAY EXAM HIP UNI 2-3 VIEWS: CPT

## 2024-03-11 PROCEDURE — 2580000003 HC RX 258: Performed by: PHYSICIAN ASSISTANT

## 2024-03-11 PROCEDURE — 1200000003 HC TELEMETRY R&B

## 2024-03-11 PROCEDURE — 99222 1ST HOSP IP/OBS MODERATE 55: CPT | Performed by: INTERNAL MEDICINE

## 2024-03-11 PROCEDURE — 1200000000 HC SEMI PRIVATE

## 2024-03-11 PROCEDURE — 90935 HEMODIALYSIS ONE EVALUATION: CPT

## 2024-03-11 PROCEDURE — 3600000014 HC SURGERY LEVEL 4 ADDTL 15MIN: Performed by: ORTHOPAEDIC SURGERY

## 2024-03-11 PROCEDURE — 2500000003 HC RX 250 WO HCPCS: Performed by: REGISTERED NURSE

## 2024-03-11 PROCEDURE — 86850 RBC ANTIBODY SCREEN: CPT

## 2024-03-11 PROCEDURE — 36415 COLL VENOUS BLD VENIPUNCTURE: CPT

## 2024-03-11 PROCEDURE — 86900 BLOOD TYPING SEROLOGIC ABO: CPT

## 2024-03-11 PROCEDURE — 6360000002 HC RX W HCPCS: Performed by: PHYSICIAN ASSISTANT

## 2024-03-11 PROCEDURE — 2709999900 HC NON-CHARGEABLE SUPPLY: Performed by: ORTHOPAEDIC SURGERY

## 2024-03-11 PROCEDURE — 83880 ASSAY OF NATRIURETIC PEPTIDE: CPT

## 2024-03-11 PROCEDURE — 73552 X-RAY EXAM OF FEMUR 2/>: CPT

## 2024-03-11 PROCEDURE — 82948 REAGENT STRIP/BLOOD GLUCOSE: CPT

## 2024-03-11 PROCEDURE — 3600000004 HC SURGERY LEVEL 4 BASE: Performed by: ORTHOPAEDIC SURGERY

## 2024-03-11 PROCEDURE — 6360000002 HC RX W HCPCS: Performed by: EMERGENCY MEDICINE

## 2024-03-11 PROCEDURE — 85025 COMPLETE CBC W/AUTO DIFF WBC: CPT

## 2024-03-11 PROCEDURE — 7100000000 HC PACU RECOVERY - FIRST 15 MIN: Performed by: ORTHOPAEDIC SURGERY

## 2024-03-11 PROCEDURE — 85730 THROMBOPLASTIN TIME PARTIAL: CPT

## 2024-03-11 PROCEDURE — 71045 X-RAY EXAM CHEST 1 VIEW: CPT

## 2024-03-11 PROCEDURE — 72125 CT NECK SPINE W/O DYE: CPT

## 2024-03-11 PROCEDURE — 7100000001 HC PACU RECOVERY - ADDTL 15 MIN: Performed by: ORTHOPAEDIC SURGERY

## 2024-03-11 PROCEDURE — 3700000001 HC ADD 15 MINUTES (ANESTHESIA): Performed by: ORTHOPAEDIC SURGERY

## 2024-03-11 PROCEDURE — C1776 JOINT DEVICE (IMPLANTABLE): HCPCS | Performed by: ORTHOPAEDIC SURGERY

## 2024-03-11 PROCEDURE — 84484 ASSAY OF TROPONIN QUANT: CPT

## 2024-03-11 PROCEDURE — 93010 ELECTROCARDIOGRAM REPORT: CPT | Performed by: INTERNAL MEDICINE

## 2024-03-11 PROCEDURE — 86901 BLOOD TYPING SEROLOGIC RH(D): CPT

## 2024-03-11 PROCEDURE — 6360000002 HC RX W HCPCS

## 2024-03-11 PROCEDURE — 70450 CT HEAD/BRAIN W/O DYE: CPT

## 2024-03-11 PROCEDURE — 90935 HEMODIALYSIS ONE EVALUATION: CPT | Performed by: INTERNAL MEDICINE

## 2024-03-11 PROCEDURE — 6360000002 HC RX W HCPCS: Performed by: REGISTERED NURSE

## 2024-03-11 PROCEDURE — 3700000000 HC ANESTHESIA ATTENDED CARE: Performed by: ORTHOPAEDIC SURGERY

## 2024-03-11 PROCEDURE — 99223 1ST HOSP IP/OBS HIGH 75: CPT | Performed by: PHYSICIAN ASSISTANT

## 2024-03-11 PROCEDURE — 80053 COMPREHEN METABOLIC PANEL: CPT

## 2024-03-11 PROCEDURE — 85610 PROTHROMBIN TIME: CPT

## 2024-03-11 PROCEDURE — 0SRS0JA REPLACEMENT OF LEFT HIP JOINT, FEMORAL SURFACE WITH SYNTHETIC SUBSTITUTE, UNCEMENTED, OPEN APPROACH: ICD-10-PCS | Performed by: ORTHOPAEDIC SURGERY

## 2024-03-11 PROCEDURE — 5A1D70Z PERFORMANCE OF URINARY FILTRATION, INTERMITTENT, LESS THAN 6 HOURS PER DAY: ICD-10-PCS | Performed by: PHYSICAL MEDICINE & REHABILITATION

## 2024-03-11 DEVICE — HIP H4 TOT HEMI UNIV BIPLR IMPL CAPPED H4 SN: Type: IMPLANTABLE DEVICE | Site: HIP | Status: FUNCTIONAL

## 2024-03-11 DEVICE — TANDEM UNIPOLAR HEAD 44MM
Type: IMPLANTABLE DEVICE | Site: HIP | Status: FUNCTIONAL
Brand: TANDEM

## 2024-03-11 DEVICE — SYNERGY POROUS FEMORAL COMPONENT SZ 14
Type: IMPLANTABLE DEVICE | Site: HIP | Status: FUNCTIONAL
Brand: SYNERGY

## 2024-03-11 DEVICE — TANDEM UNIPOLAR 12/14 TAPER SLEEVE -3
Type: IMPLANTABLE DEVICE | Site: HIP | Status: FUNCTIONAL
Brand: TANDEM

## 2024-03-11 RX ORDER — HYDROCODONE BITARTRATE AND ACETAMINOPHEN 5; 325 MG/1; MG/1
1 TABLET ORAL EVERY 4 HOURS PRN
Status: DISCONTINUED | OUTPATIENT
Start: 2024-03-11 | End: 2024-03-14 | Stop reason: HOSPADM

## 2024-03-11 RX ORDER — CLOPIDOGREL BISULFATE 75 MG/1
75 TABLET ORAL DAILY
Status: DISCONTINUED | OUTPATIENT
Start: 2024-03-11 | End: 2024-03-11

## 2024-03-11 RX ORDER — ONDANSETRON 4 MG/1
4 TABLET, ORALLY DISINTEGRATING ORAL EVERY 8 HOURS PRN
Status: DISCONTINUED | OUTPATIENT
Start: 2024-03-11 | End: 2024-03-14 | Stop reason: HOSPADM

## 2024-03-11 RX ORDER — FOLIC ACID 1 MG/1
500 TABLET ORAL DAILY
Status: DISCONTINUED | OUTPATIENT
Start: 2024-03-11 | End: 2024-03-14 | Stop reason: HOSPADM

## 2024-03-11 RX ORDER — ONDANSETRON 2 MG/ML
4 INJECTION INTRAMUSCULAR; INTRAVENOUS EVERY 6 HOURS PRN
Status: DISCONTINUED | OUTPATIENT
Start: 2024-03-11 | End: 2024-03-14 | Stop reason: HOSPADM

## 2024-03-11 RX ORDER — SODIUM CHLORIDE 0.9 % (FLUSH) 0.9 %
5-40 SYRINGE (ML) INJECTION EVERY 12 HOURS SCHEDULED
Status: DISCONTINUED | OUTPATIENT
Start: 2024-03-11 | End: 2024-03-14 | Stop reason: HOSPADM

## 2024-03-11 RX ORDER — MEPERIDINE HYDROCHLORIDE 25 MG/ML
12.5 INJECTION INTRAMUSCULAR; INTRAVENOUS; SUBCUTANEOUS EVERY 5 MIN PRN
Status: DISCONTINUED | OUTPATIENT
Start: 2024-03-11 | End: 2024-03-11 | Stop reason: HOSPADM

## 2024-03-11 RX ORDER — LIDOCAINE HYDROCHLORIDE 40 MG/ML
SOLUTION TOPICAL PRN
Status: DISCONTINUED | OUTPATIENT
Start: 2024-03-11 | End: 2024-03-11 | Stop reason: SDUPTHER

## 2024-03-11 RX ORDER — TRANEXAMIC ACID 10 MG/ML
1000 INJECTION, SOLUTION INTRAVENOUS
Status: DISCONTINUED | OUTPATIENT
Start: 2024-03-11 | End: 2024-03-12

## 2024-03-11 RX ORDER — BUPROPION HYDROCHLORIDE 300 MG/1
300 TABLET ORAL DAILY
Status: DISCONTINUED | OUTPATIENT
Start: 2024-03-11 | End: 2024-03-14 | Stop reason: HOSPADM

## 2024-03-11 RX ORDER — DEXTROSE MONOHYDRATE 100 MG/ML
INJECTION, SOLUTION INTRAVENOUS CONTINUOUS PRN
Status: DISCONTINUED | OUTPATIENT
Start: 2024-03-11 | End: 2024-03-14 | Stop reason: HOSPADM

## 2024-03-11 RX ORDER — SODIUM CHLORIDE 9 MG/ML
INJECTION, SOLUTION INTRAVENOUS PRN
Status: DISCONTINUED | OUTPATIENT
Start: 2024-03-11 | End: 2024-03-14 | Stop reason: HOSPADM

## 2024-03-11 RX ORDER — PROPOFOL 10 MG/ML
INJECTION, EMULSION INTRAVENOUS PRN
Status: DISCONTINUED | OUTPATIENT
Start: 2024-03-11 | End: 2024-03-11 | Stop reason: SDUPTHER

## 2024-03-11 RX ORDER — INSULIN LISPRO 100 [IU]/ML
0-4 INJECTION, SOLUTION INTRAVENOUS; SUBCUTANEOUS
Status: DISCONTINUED | OUTPATIENT
Start: 2024-03-11 | End: 2024-03-12

## 2024-03-11 RX ORDER — TRAZODONE HYDROCHLORIDE 50 MG/1
50 TABLET ORAL NIGHTLY
Status: DISCONTINUED | OUTPATIENT
Start: 2024-03-11 | End: 2024-03-14 | Stop reason: HOSPADM

## 2024-03-11 RX ORDER — INSULIN LISPRO 100 [IU]/ML
0-4 INJECTION, SOLUTION INTRAVENOUS; SUBCUTANEOUS NIGHTLY
Status: DISCONTINUED | OUTPATIENT
Start: 2024-03-11 | End: 2024-03-12

## 2024-03-11 RX ORDER — MIDAZOLAM HYDROCHLORIDE 1 MG/ML
INJECTION INTRAMUSCULAR; INTRAVENOUS PRN
Status: DISCONTINUED | OUTPATIENT
Start: 2024-03-11 | End: 2024-03-11 | Stop reason: SDUPTHER

## 2024-03-11 RX ORDER — SODIUM CHLORIDE 0.9 % (FLUSH) 0.9 %
5-40 SYRINGE (ML) INJECTION PRN
Status: DISCONTINUED | OUTPATIENT
Start: 2024-03-11 | End: 2024-03-11 | Stop reason: HOSPADM

## 2024-03-11 RX ORDER — HYDROCODONE BITARTRATE AND ACETAMINOPHEN 5; 325 MG/1; MG/1
2 TABLET ORAL EVERY 4 HOURS PRN
Status: DISCONTINUED | OUTPATIENT
Start: 2024-03-11 | End: 2024-03-14 | Stop reason: HOSPADM

## 2024-03-11 RX ORDER — SODIUM CHLORIDE 9 MG/ML
INJECTION, SOLUTION INTRAVENOUS PRN
Status: DISCONTINUED | OUTPATIENT
Start: 2024-03-11 | End: 2024-03-11 | Stop reason: HOSPADM

## 2024-03-11 RX ORDER — CLOPIDOGREL BISULFATE 75 MG/1
75 TABLET ORAL DAILY
Status: DISCONTINUED | OUTPATIENT
Start: 2024-03-12 | End: 2024-03-14 | Stop reason: HOSPADM

## 2024-03-11 RX ORDER — MONTELUKAST SODIUM 10 MG/1
10 TABLET ORAL DAILY
Status: DISCONTINUED | OUTPATIENT
Start: 2024-03-11 | End: 2024-03-14 | Stop reason: HOSPADM

## 2024-03-11 RX ORDER — SODIUM CHLORIDE 9 MG/ML
INJECTION, SOLUTION INTRAVENOUS CONTINUOUS
Status: DISCONTINUED | OUTPATIENT
Start: 2024-03-11 | End: 2024-03-12

## 2024-03-11 RX ORDER — POLYETHYLENE GLYCOL 3350 17 G/17G
17 POWDER, FOR SOLUTION ORAL DAILY PRN
Status: DISCONTINUED | OUTPATIENT
Start: 2024-03-11 | End: 2024-03-14 | Stop reason: HOSPADM

## 2024-03-11 RX ORDER — SEVELAMER CARBONATE 800 MG/1
800 TABLET, FILM COATED ORAL
Status: DISCONTINUED | OUTPATIENT
Start: 2024-03-11 | End: 2024-03-14 | Stop reason: HOSPADM

## 2024-03-11 RX ORDER — ONDANSETRON 2 MG/ML
4 INJECTION INTRAMUSCULAR; INTRAVENOUS ONCE
Status: COMPLETED | OUTPATIENT
Start: 2024-03-11 | End: 2024-03-11

## 2024-03-11 RX ORDER — AMLODIPINE BESYLATE 5 MG/1
5 TABLET ORAL DAILY
Status: DISCONTINUED | OUTPATIENT
Start: 2024-03-11 | End: 2024-03-14 | Stop reason: HOSPADM

## 2024-03-11 RX ORDER — MEPERIDINE HYDROCHLORIDE 25 MG/ML
INJECTION INTRAMUSCULAR; INTRAVENOUS; SUBCUTANEOUS
Status: COMPLETED
Start: 2024-03-11 | End: 2024-03-11

## 2024-03-11 RX ORDER — SODIUM CHLORIDE 0.9 % (FLUSH) 0.9 %
5-40 SYRINGE (ML) INJECTION EVERY 12 HOURS SCHEDULED
Status: DISCONTINUED | OUTPATIENT
Start: 2024-03-11 | End: 2024-03-11 | Stop reason: HOSPADM

## 2024-03-11 RX ORDER — ONDANSETRON 2 MG/ML
INJECTION INTRAMUSCULAR; INTRAVENOUS PRN
Status: DISCONTINUED | OUTPATIENT
Start: 2024-03-11 | End: 2024-03-11 | Stop reason: SDUPTHER

## 2024-03-11 RX ORDER — ATORVASTATIN CALCIUM 10 MG/1
10 TABLET, FILM COATED ORAL DAILY
Status: DISCONTINUED | OUTPATIENT
Start: 2024-03-11 | End: 2024-03-14 | Stop reason: HOSPADM

## 2024-03-11 RX ORDER — GLUCAGON 1 MG/ML
1 KIT INJECTION PRN
Status: DISCONTINUED | OUTPATIENT
Start: 2024-03-11 | End: 2024-03-14 | Stop reason: HOSPADM

## 2024-03-11 RX ORDER — HEPARIN SODIUM 5000 [USP'U]/ML
5000 INJECTION, SOLUTION INTRAVENOUS; SUBCUTANEOUS 2 TIMES DAILY
Status: DISCONTINUED | OUTPATIENT
Start: 2024-03-12 | End: 2024-03-14 | Stop reason: HOSPADM

## 2024-03-11 RX ORDER — DEXAMETHASONE SODIUM PHOSPHATE 10 MG/ML
INJECTION, EMULSION INTRAMUSCULAR; INTRAVENOUS PRN
Status: DISCONTINUED | OUTPATIENT
Start: 2024-03-11 | End: 2024-03-11 | Stop reason: SDUPTHER

## 2024-03-11 RX ORDER — SODIUM CHLORIDE 0.9 % (FLUSH) 0.9 %
5-40 SYRINGE (ML) INJECTION PRN
Status: DISCONTINUED | OUTPATIENT
Start: 2024-03-11 | End: 2024-03-14 | Stop reason: HOSPADM

## 2024-03-11 RX ORDER — ROCURONIUM BROMIDE 10 MG/ML
INJECTION, SOLUTION INTRAVENOUS PRN
Status: DISCONTINUED | OUTPATIENT
Start: 2024-03-11 | End: 2024-03-11 | Stop reason: SDUPTHER

## 2024-03-11 RX ORDER — FENTANYL CITRATE 50 UG/ML
INJECTION, SOLUTION INTRAMUSCULAR; INTRAVENOUS PRN
Status: DISCONTINUED | OUTPATIENT
Start: 2024-03-11 | End: 2024-03-11 | Stop reason: SDUPTHER

## 2024-03-11 RX ADMIN — SUGAMMADEX 200 MG: 100 INJECTION, SOLUTION INTRAVENOUS at 17:28

## 2024-03-11 RX ADMIN — PROPOFOL 150 MG: 10 INJECTION, EMULSION INTRAVENOUS at 15:43

## 2024-03-11 RX ADMIN — HYDROCODONE BITARTRATE AND ACETAMINOPHEN 2 TABLET: 5; 325 TABLET ORAL at 09:13

## 2024-03-11 RX ADMIN — MEPERIDINE HYDROCHLORIDE 12.5 MG: 25 INJECTION INTRAMUSCULAR; INTRAVENOUS; SUBCUTANEOUS at 18:05

## 2024-03-11 RX ADMIN — LIDOCAINE HYDROCHLORIDE 4 ML: 40 SOLUTION TOPICAL at 15:43

## 2024-03-11 RX ADMIN — VANCOMYCIN HYDROCHLORIDE 1000 MG: 1 INJECTION, POWDER, LYOPHILIZED, FOR SOLUTION INTRAVENOUS at 15:16

## 2024-03-11 RX ADMIN — AMLODIPINE BESYLATE 5 MG: 5 TABLET ORAL at 12:13

## 2024-03-11 RX ADMIN — FENTANYL CITRATE 50 MCG: 50 INJECTION, SOLUTION INTRAMUSCULAR; INTRAVENOUS at 16:27

## 2024-03-11 RX ADMIN — PHENYLEPHRINE HYDROCHLORIDE 200 MCG: 10 INJECTION INTRAVENOUS at 15:47

## 2024-03-11 RX ADMIN — HYDROMORPHONE HYDROCHLORIDE 0.5 MG: 1 INJECTION, SOLUTION INTRAMUSCULAR; INTRAVENOUS; SUBCUTANEOUS at 07:50

## 2024-03-11 RX ADMIN — ONDANSETRON 4 MG: 2 INJECTION INTRAMUSCULAR; INTRAVENOUS at 00:17

## 2024-03-11 RX ADMIN — SODIUM CHLORIDE, PRESERVATIVE FREE 10 ML: 5 INJECTION INTRAVENOUS at 21:45

## 2024-03-11 RX ADMIN — SODIUM CHLORIDE: 9 INJECTION, SOLUTION INTRAVENOUS at 05:30

## 2024-03-11 RX ADMIN — MEPERIDINE HYDROCHLORIDE 12.5 MG: 25 INJECTION, SOLUTION INTRAMUSCULAR; INTRAVENOUS; SUBCUTANEOUS at 18:05

## 2024-03-11 RX ADMIN — SODIUM CHLORIDE: 9 INJECTION, SOLUTION INTRAVENOUS at 15:40

## 2024-03-11 RX ADMIN — ONDANSETRON 4 MG: 2 INJECTION INTRAMUSCULAR; INTRAVENOUS at 15:43

## 2024-03-11 RX ADMIN — MIDAZOLAM 2 MG: 1 INJECTION INTRAMUSCULAR; INTRAVENOUS at 15:40

## 2024-03-11 RX ADMIN — FENTANYL CITRATE 25 MCG: 50 INJECTION, SOLUTION INTRAMUSCULAR; INTRAVENOUS at 15:43

## 2024-03-11 RX ADMIN — HYDROMORPHONE HYDROCHLORIDE 1 MG: 1 INJECTION, SOLUTION INTRAMUSCULAR; INTRAVENOUS; SUBCUTANEOUS at 00:18

## 2024-03-11 RX ADMIN — HYDROMORPHONE HYDROCHLORIDE 0.5 MG: 1 INJECTION, SOLUTION INTRAMUSCULAR; INTRAVENOUS; SUBCUTANEOUS at 21:43

## 2024-03-11 RX ADMIN — FENTANYL CITRATE 25 MCG: 50 INJECTION, SOLUTION INTRAMUSCULAR; INTRAVENOUS at 16:13

## 2024-03-11 RX ADMIN — TRAZODONE HYDROCHLORIDE 50 MG: 50 TABLET ORAL at 21:49

## 2024-03-11 RX ADMIN — SODIUM CHLORIDE: 9 INJECTION, SOLUTION INTRAVENOUS at 17:00

## 2024-03-11 RX ADMIN — HYDROMORPHONE HYDROCHLORIDE 1 MG: 1 INJECTION, SOLUTION INTRAMUSCULAR; INTRAVENOUS; SUBCUTANEOUS at 01:20

## 2024-03-11 RX ADMIN — HYDROMORPHONE HYDROCHLORIDE 0.5 MG: 1 INJECTION, SOLUTION INTRAMUSCULAR; INTRAVENOUS; SUBCUTANEOUS at 04:44

## 2024-03-11 RX ADMIN — HYDROMORPHONE HYDROCHLORIDE 0.5 MG: 1 INJECTION, SOLUTION INTRAMUSCULAR; INTRAVENOUS; SUBCUTANEOUS at 14:07

## 2024-03-11 RX ADMIN — PHENYLEPHRINE HYDROCHLORIDE 100 MCG: 10 INJECTION INTRAVENOUS at 15:53

## 2024-03-11 RX ADMIN — POLYETHYLENE GLYCOL 3350 17 G: 17 POWDER, FOR SOLUTION ORAL at 21:49

## 2024-03-11 RX ADMIN — DEXAMETHASONE SODIUM PHOSPHATE 10 MG: 10 INJECTION, EMULSION INTRAMUSCULAR; INTRAVENOUS at 15:43

## 2024-03-11 RX ADMIN — ROCURONIUM BROMIDE 30 MG: 10 INJECTION INTRAVENOUS at 15:43

## 2024-03-11 RX ADMIN — ONDANSETRON 4 MG: 2 INJECTION INTRAMUSCULAR; INTRAVENOUS at 21:36

## 2024-03-11 ASSESSMENT — PATIENT HEALTH QUESTIONNAIRE - PHQ9
SUM OF ALL RESPONSES TO PHQ QUESTIONS 1-9: 0
SUM OF ALL RESPONSES TO PHQ QUESTIONS 1-9: 0
1. LITTLE INTEREST OR PLEASURE IN DOING THINGS: 0
SUM OF ALL RESPONSES TO PHQ QUESTIONS 1-9: 0
SUM OF ALL RESPONSES TO PHQ9 QUESTIONS 1 & 2: 0
2. FEELING DOWN, DEPRESSED OR HOPELESS: NOT AT ALL
2. FEELING DOWN, DEPRESSED OR HOPELESS: 0
SUM OF ALL RESPONSES TO PHQ QUESTIONS 1-9: 0
1. LITTLE INTEREST OR PLEASURE IN DOING THINGS: NOT AT ALL

## 2024-03-11 ASSESSMENT — PAIN SCALES - GENERAL
PAINLEVEL_OUTOF10: 7
PAINLEVEL_OUTOF10: 10
PAINLEVEL_OUTOF10: 8
PAINLEVEL_OUTOF10: 3
PAINLEVEL_OUTOF10: 10
PAINLEVEL_OUTOF10: 7
PAINLEVEL_OUTOF10: 10
PAINLEVEL_OUTOF10: 10
PAINLEVEL_OUTOF10: 5

## 2024-03-11 ASSESSMENT — PAIN - FUNCTIONAL ASSESSMENT
PAIN_FUNCTIONAL_ASSESSMENT: INTOLERABLE, UNABLE TO DO ANY ACTIVE OR PASSIVE ACTIVITIES
PAIN_FUNCTIONAL_ASSESSMENT: PREVENTS OR INTERFERES SOME ACTIVE ACTIVITIES AND ADLS
PAIN_FUNCTIONAL_ASSESSMENT: INTOLERABLE, UNABLE TO DO ANY ACTIVE OR PASSIVE ACTIVITIES
PAIN_FUNCTIONAL_ASSESSMENT: PREVENTS OR INTERFERES SOME ACTIVE ACTIVITIES AND ADLS

## 2024-03-11 ASSESSMENT — PAIN DESCRIPTION - ORIENTATION
ORIENTATION: LEFT

## 2024-03-11 ASSESSMENT — LIFESTYLE VARIABLES
HOW MANY STANDARD DRINKS CONTAINING ALCOHOL DO YOU HAVE ON A TYPICAL DAY: PATIENT DOES NOT DRINK
HOW OFTEN DO YOU HAVE A DRINK CONTAINING ALCOHOL: NEVER

## 2024-03-11 ASSESSMENT — PAIN DESCRIPTION - LOCATION
LOCATION: HIP
LOCATION: LEG
LOCATION: LEG
LOCATION: HIP
LOCATION: HIP

## 2024-03-11 ASSESSMENT — PAIN DESCRIPTION - DESCRIPTORS
DESCRIPTORS: SHARP
DESCRIPTORS: SHARP
DESCRIPTORS: ACHING;SHARP
DESCRIPTORS: SHARP

## 2024-03-11 ASSESSMENT — PAIN DESCRIPTION - PAIN TYPE: TYPE: ACUTE PAIN

## 2024-03-11 ASSESSMENT — PAIN DESCRIPTION - FREQUENCY: FREQUENCY: INTERMITTENT

## 2024-03-11 ASSESSMENT — PAIN DESCRIPTION - ONSET: ONSET: ON-GOING

## 2024-03-11 NOTE — PLAN OF CARE
scan as needed  Note: Continuous dialysis as ordered     Problem: Infection - Adult  Goal: Absence of infection during hospitalization  3/11/2024 1322 by Velvet Villela RN  Outcome: Progressing  Flowsheets (Taken 3/11/2024 1150)  Absence of infection during hospitalization:   Assess and monitor for signs and symptoms of infection   Monitor lab/diagnostic results   Monitor all insertion sites i.e., indwelling lines, tubes and drains   Kirvin appropriate cooling/warming therapies per order   Administer medications as ordered   Instruct and encourage patient and family to use good hand hygiene technique     Problem: Metabolic/Fluid and Electrolytes - Adult  Goal: Glucose maintained within prescribed range  3/11/2024 1322 by Velvet Villela RN  Outcome: Progressing  Flowsheets (Taken 3/11/2024 1150)  Glucose maintained within prescribed range:   Monitor blood glucose as ordered   Assess for signs and symptoms of hyperglycemia and hypoglycemia   Administer ordered medications to maintain glucose within target range   Assess barriers to adequate nutritional intake and initiate nutrition consult as needed     Problem: Chronic Conditions and Co-morbidities  Goal: Patient's chronic conditions and co-morbidity symptoms are monitored and maintained or improved  Outcome: Progressing  Flowsheets (Taken 3/11/2024 1150)  Care Plan - Patient's Chronic Conditions and Co-Morbidity Symptoms are Monitored and Maintained or Improved:   Monitor and assess patient's chronic conditions and comorbid symptoms for stability, deterioration, or improvement   Collaborate with multidisciplinary team to address chronic and comorbid conditions and prevent exacerbation or deterioration   Update acute care plan with appropriate goals if chronic or comorbid symptoms are exacerbated and prevent overall improvement and discharge   Care plan reviewed with patient and spouse.  Patient and spouse verbalize understanding of the plan of care  and contribute to goal setting.

## 2024-03-11 NOTE — ANESTHESIA PRE PROCEDURE
PACU post op for recovery.    Possible anesthetics complications were discussed with the patient, including but not limited to: PONV, damage to the airway and surrounding structures (teeth, lips, gums, tongue, etc.), adverse reactions to medicine, cardiac complications (MI, CHF, arrhythmias, etc.), respiratory complications (post-op ventilation, respiratory failure, etc.), neurologic complications (nerve damage, stroke, seizure), and death. The patient was given the opportunity to ask questions and all questions were answered to the patient's satisfaction. The patient is in agreement with the anesthetic plan.  )  Induction: intravenous.      Anesthetic plan and risks discussed with patient, spouse and child/children.      Plan discussed with CRNA.                Jadon Cagle,    3/11/2024

## 2024-03-11 NOTE — ED TRIAGE NOTES
Patient to the ED via ems with c/o left hip pain after a mechanical fall at home. Patient reports she was in the bathroom when she lost her balance and fell into the wall then onto the floor. Patient states she thinks she \"bumped\" her head, no visible head injury. Patient denies taking a blood thinner. Patient reports severe left hip pain. Left foot appears to be externally rotated. EKG complete. VSS.

## 2024-03-11 NOTE — CONSULTS
Pt bridgett whiting examined by me during hemodialysis  Tolerating ok. /90,    130    See full consult for details    To Cuadra MD

## 2024-03-11 NOTE — FLOWSHEET NOTE
03/11/24 1107   Vital Signs   BP (!) 190/93   Temp 98.3 °F (36.8 °C)   Pulse 93   Respirations 16   Weight - Scale 44.7 kg (98 lb 8.7 oz)   Weight Method Bed scale   Percent Weight Change 0   Post-Hemodialysis Assessment   Post-Treatment Procedures Blood returned;Catheter Capped, clamped with Saline x2 ports   Machine Disinfection Process Acid/Vinegar Clean;Heat Disinfect;Exterior Machine Disinfection   Rinseback Volume (ml) 400 ml   Blood Volume Processed (Liters) 57.8 L   Dialyzer Clearance Lightly streaked   Duration of Treatment (minutes) 150 minutes   Heparin Amount Administered During Treatment (mL) 0 mL   Hemodialysis Intake (ml) 400 ml   Hemodialysis Output (ml) 400 ml   NET Removed (ml) 0     Stable 2.5 hour treatment complete. norco administered for hip pain with some improvement in symptoms.  No fluid removed. Tolerated treatment well. HD catheter ports flushed, clamped and capped. Dressing clean, dry and intact. Report given to primary RN. Treatment record printed for scanning into EMR.

## 2024-03-11 NOTE — ED PROVIDER NOTES
Ovary removal (Bilateral); CT BIOPSY RENAL (12/28/2022); Cystoscopy (Left, 08/29/2023); Ureter surgery (N/A, 09/20/2023); Spine surgery (N/A, 12/18/2023); and Humerus fracture surgery (Right, 2021).    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  Associated Diagnoses: Anxiety; Recurrent major depressive disorder, in full remission (HCC); Psychophysiological insomnia      !! buPROPion (WELLBUTRIN XL) 150 MG extended release tablet Take with 300 mg daily for 450 mg daily.  Qty: 90 tablet, Refills: 1    Associated Diagnoses: Recurrent major depressive disorder, in full remission (HCC)      !! buPROPion (WELLBUTRIN XL) 300 MG extended release tablet Take with 150 mg for 450 mg daily.  Qty: 90 tablet, Refills: 1    Associated Diagnoses: Recurrent major depressive disorder, in full remission (HCC)      Handicap Placard MISC by Does not apply route Expires in 5 years.  Dx:  Qty: 1 each, Refills: 0    Associated Diagnoses: Closed compression fracture of L2 lumbar vertebra with routine healing, subsequent encounter      bisacodyl (DULCOLAX) 10 MG suppository Place 1 suppository rectally daily as needed for Constipation  Qty: 30 suppository, Refills: 3     warranted for ORIF.  Discussed with and admitted by Ortho with hospital medicine consult for medical clearance.  Stable ED stay.    ED MEDICATIONS:  (None if blank)  Medications   ondansetron (ZOFRAN) injection 4 mg (4 mg IntraVENous Given 3/11/24 0017)   HYDROmorphone (DILAUDID) injection 1 mg (1 mg IntraVENous Given 3/11/24 0018)   HYDROmorphone (DILAUDID) injection 1 mg (1 mg IntraVENous Given 3/11/24 0120)     CONSULTANTS:  Ortho and hospital medicine    PROCEDURES:   None    CRITICAL CARE:   None    ED REASSESSMENT  Stable    Vitals:    03/10/24 2341 03/11/24 0018 03/11/24 0047 03/11/24 0112   BP: (!) 167/78  (!) 153/73 (!) 157/65   Pulse: 74  83 83   Resp: 16 18 10 18   Temp: 98.4 °F (36.9 °C)      TempSrc: Oral      SpO2: 95%  93% 96%   Weight: 45.4 kg (100 lb)          NUMBER AND COMPLEXITY OF PROBLEMS        Problem List This Visit: Fall, left hip pain, ESRD HD    Pertinent Comorbid Conditions:  See HPI, PMH and PSH    DATA REVIEWED(none if left blank)    Code Status:  Reviewed with patient and/or family as full code    External Documentation Reviewed: Relevant record in care everywhere is reviewed (If there is any).    Previous relevant patient encounter documents & history available on EMR was reviewed: Yes (If there is any)    See Formal Diagnostic Results above for the lab and radiology tests and orders.    Shared Decision-Making: ED workups, treatment plan and dispositions are discussed with the patient/family, questions answered     FINAL IMPRESSION AND DISPOSITION/PLAN     1. Closed subcapital fracture of femur, left, initial encounter (HCC)    2. ESRD on hemodialysis (HCC)        DISPOSITION Admitted 03/11/2024 01:12:01 AM      OUTPATIENT FOLLOW UP THE PATIENT:  No follow-up provider specified.    DISCHARGE MEDICATIONS:(None if blank)  Current Discharge Medication List          MD Elio Carter Jian, MD  03/11/24 7285

## 2024-03-11 NOTE — PLAN OF CARE
Problem: Safety - Adult  Goal: Free from fall injury  Outcome: Progressing  Flowsheets (Taken 3/11/2024 0628)  Free From Fall Injury:   Instruct family/caregiver on patient safety   Based on caregiver fall risk screen, instruct family/caregiver to ask for assistance with transferring infant if caregiver noted to have fall risk factors     Problem: Discharge Planning  Goal: Discharge to home or other facility with appropriate resources  Outcome: Progressing  Flowsheets  Taken 3/11/2024 0254  Discharge to home or other facility with appropriate resources:   Identify barriers to discharge with patient and caregiver   Arrange for needed discharge resources and transportation as appropriate   Identify discharge learning needs (meds, wound care, etc)  Taken 3/11/2024 0220  Discharge to home or other facility with appropriate resources:   Identify barriers to discharge with patient and caregiver   Arrange for needed discharge resources and transportation as appropriate   Identify discharge learning needs (meds, wound care, etc)     Problem: Pain  Goal: Verbalizes/displays adequate comfort level or baseline comfort level  Outcome: Progressing  Flowsheets (Taken 3/11/2024 0220)  Verbalizes/displays adequate comfort level or baseline comfort level:   Encourage patient to monitor pain and request assistance   Assess pain using appropriate pain scale   Administer analgesics based on type and severity of pain and evaluate response   Implement non-pharmacological measures as appropriate and evaluate response   Consider cultural and social influences on pain and pain management     Problem: ABCDS Injury Assessment  Goal: Absence of physical injury  Outcome: Progressing  Flowsheets (Taken 3/11/2024 0628)  Absence of Physical Injury: Implement safety measures based on patient assessment     Problem: Skin/Tissue Integrity  Goal: Absence of new skin breakdown  Description: 1.  Monitor for areas of redness and/or skin breakdown  2.   Assess vascular access sites hourly  3.  Every 4-6 hours minimum:  Change oxygen saturation probe site  4.  Every 4-6 hours:  If on nasal continuous positive airway pressure, respiratory therapy assess nares and determine need for appliance change or resting period.  Outcome: Progressing     Problem: Skin/Tissue Integrity - Adult  Goal: Skin integrity remains intact  Outcome: Progressing  Flowsheets (Taken 3/11/2024 0220)  Skin Integrity Remains Intact:   Monitor for areas of redness and/or skin breakdown   Assess vascular access sites hourly     Problem: Musculoskeletal - Adult  Goal: Return mobility to safest level of function  Outcome: Progressing  Flowsheets (Taken 3/11/2024 0629)  Return Mobility to Safest Level of Function:   Assess patient stability and activity tolerance for standing, transferring and ambulating with or without assistive devices   Assist with transfers and ambulation using safe patient handling equipment as needed   Ensure adequate protection for wounds/incisions during mobilization   Obtain physical therapy/occupational therapy consults as needed   Instruct patient/family in ordered activity level     Problem: Genitourinary - Adult  Goal: Absence of urinary retention  Outcome: Progressing  Flowsheets (Taken 3/11/2024 0629)  Absence of urinary retention:   Assess patient’s ability to void and empty bladder   Monitor intake/output and perform bladder scan as needed     Problem: Infection - Adult  Goal: Absence of infection during hospitalization  Outcome: Progressing  Flowsheets (Taken 3/11/2024 0629)  Absence of infection during hospitalization:   Assess and monitor for signs and symptoms of infection   Monitor lab/diagnostic results   Administer medications as ordered   Instruct and encourage patient and family to use good hand hygiene technique     Problem: Metabolic/Fluid and Electrolytes - Adult  Goal: Glucose maintained within prescribed range  Outcome: Progressing  Flowsheets (Taken

## 2024-03-11 NOTE — H&P
History and Physical    CHIEF COMPLAINT:  Left hip pain    HISTORY OF PRESENT ILLNESS:      The patient is a 68 y.o. female with a past medical history of ESRD on hemodialysis every MWF, CHF, CVA, hypertension, and hyperlipidemia who presented to [facility] ED following a fall.  Patient is a walker assisted ambulator at baseline. She endorsed last evening she was standing in her bathroom when she lost her balance and fell backwards and landed on her left hip.  She did endorse bumping her head but denied any loss of consciousness.  She is on aspirin and Plavix.  No anticoagulation reported.  Patient endorses following the fall she had immediate and severe pain in her left hip.  She was unable to ambulate. She was found to have femoral neck fracture and orthopedic surgery was consulted.  Upon assessment this morning patient stated she had significant pain in her left hip.  She denied any other acute pain or complaints.  She denies any headaches, lightheadedness, dizziness, neck pain, back pain, chest pain, shortness of breath, abdominal pain, nausea/vomiting, other pain in extremities, and paresthesias.  Plain films of the left hip reviewed a acute displaced fracture of the left femoral neck.  CT head and cervical spine negative for any acute traumatic injuries.  Chest x-ray unremarkable.  Labs and vital signs reviewed.  Patient afebrile, vital signs stable.  No leukocytosis.  Hemoglobin 12.8.  Creatinine 6.2.  None since surgery troponin elevated at 53 but has previously been 54 and 58 on 2/13/2024.  Patient's admitted under orthopedic surgery with consults placed to hospitalist for preop risk assessment and assistance medical management as well as nephrology for hemodialysis.    Code status reviewed with patient, full code. Nurse present for discussion.    Past Medical History:    Past Medical History:   Diagnosis Date    Allergic rhinitis     Anemia in CKD (chronic kidney disease)     Anxiety     CHF (congestive heart  Units SubCUTAneous Nightly Trenton Briceno PA        glucose chewable tablet 16 g  4 tablet Oral PRN Trenton Briceno PA        dextrose bolus 10% 125 mL  125 mL IntraVENous PRN Trenton Briceno PA        Or    dextrose bolus 10% 250 mL  250 mL IntraVENous PRN Trenton Briceno PA        glucagon injection 1 mg  1 mg SubCUTAneous PRN Trenton Briceno PA        dextrose 10 % infusion   IntraVENous Continuous PRN Trenton Briceno PA             Allergies:  Pioglitazone, Amoxicillin, Amoxicillin-pot clavulanate, Other, Ciprofloxacin, and Sulfa antibiotics    Social History:   Negative for tobacco use, alcohol abuse and illicit drug abuse    Family History:  Family History   Problem Relation Age of Onset    Diabetes Mother     High Blood Pressure Mother     Heart Disease Mother     Heart Disease Father     Parkinsonism Father     Neurofibromatosis Father     Depression Father     Alcohol Abuse Father     Neurofibromatosis Sister     Diabetes Sister     High Blood Pressure Sister     Other Sister         suicide    Depression Sister     Neurofibromatosis Brother     Dementia Brother     Mult Sclerosis Brother     Hypertension Brother     Diabetes Brother        REVIEW OF SYSTEMS:  10 point review of systems otherwise negative unless noted in the HPI    PHYSICAL EXAM:  Vitals:    03/11/24 0444   BP:    Pulse:    Resp: 18   Temp:    SpO2:      GENERAL: Awake, alert, no acute distress, pleasant and cooperative with exam  HEENT: Normocephalic, pupils equal and reactive to light, nares patent bilaterally, no obvious external signs of head trauma  NEURO: Alert and orient x3, GCS 15, follows commands, PMS intact in all four extremities, no signs of focal neurological deficits  HEART:  Distal pulses intact.  LUNGS/CHEST WALL: No respiratory distress or increased work of breathing.     ABDOMEN: Abdomen soft, nondistended, with no tenderness to palpation.   EXTREMITIES: Left lower extremity is shortened and externally rotated.  Patient with significant

## 2024-03-11 NOTE — ED NOTES
Patient transported to  [facility] by cart in stable condition.   IV  line is patent.

## 2024-03-11 NOTE — ED NOTES
ED to inpatient nurses report      Chief Complaint:  Chief Complaint   Patient presents with    Fall    Hip Pain     Present to ED from: home    MOA:     LOC: alert and orientated to name, place, date  Mobility: Fully dependent  Oxygen Baseline: room air    Current needs required: room air     Code Status:   Prior    What abnormal results were found and what did you give/do to treat them? Closed subcapital fracture of left femur      Mental Status:  Level of Consciousness: Alert (0)    Psych Assessment:        Vitals:  Patient Vitals for the past 24 hrs:   BP Temp Temp src Pulse Resp SpO2 Weight   03/11/24 0120 -- -- -- -- 18 -- --   03/11/24 0112 (!) 157/65 -- -- 83 18 96 % --   03/11/24 0047 (!) 153/73 -- -- 83 10 93 % --   03/11/24 0018 -- -- -- -- 18 -- --   03/10/24 2341 (!) 167/78 98.4 °F (36.9 °C) Oral 74 16 95 % 45.4 kg (100 lb)        LDAs:   Peripheral IV 03/11/24 Right Forearm (Active)   Site Assessment Clean, dry & intact 03/11/24 0001   Line Status Brisk blood return;Specimen collected;Normal saline locked 03/11/24 0001       Ambulatory Status:  No data recorded    Diagnosis:  DISPOSITION Admitted 03/11/2024 01:12:01 AM   Final diagnoses:   Closed subcapital fracture of femur, left, initial encounter ([facility])   ESRD on hemodialysis ([facility])        Consults:  IP CONSULT TO HOSPITALIST     Pain Score:  Pain Assessment  Pain Assessment: 0-10  Pain Level: 8  Patient's Stated Pain Goal: 3  Pain Location: Leg  Pain Orientation: Left    C-SSRS:   Risk of Suicide: No Risk    Sepsis Screening:  Sepsis Risk Score: 1.12    [place] Fall Risk:       Swallow Screening        Preferred Language:   English      ALLERGIES     Pioglitazone, Amoxicillin, Amoxicillin-pot clavulanate, Other, Ciprofloxacin, and Sulfa antibiotics    SURGICAL HISTORY       Past Surgical History:   Procedure Laterality Date    CARPAL TUNNEL RELEASE Right     in 1990s    COLONOSCOPY  2012    [facility]    CT BIOPSY RENAL  12/28/2022    CT BIOPSY RENAL

## 2024-03-11 NOTE — CARE COORDINATION
Case Management Assessment  Initial Evaluation    Date/Time of Evaluation: 3/11/2024 1:38 PM  Assessment Completed by: Yamile Ayala RN    If patient is discharged prior to next notation, then this note serves as note for discharge by case management.    Patient Name: Deloris Watts                   YOB: 1955  Diagnosis: ESRD on hemodialysis (HCC) [N18.6, Z99.2]  Closed fracture of neck of left femur, initial encounter ([facility]) [S72.002A]  Closed subcapital fracture of femur, left, initial encounter ([facility]) [S72.012A]  Closed displaced fracture of left femoral neck ([facility]) [S72.002A]                   Date / Time: 3/10/2024 11:37 PM  Location: [facility]07/007     Patient Admission Status: Inpatient   Readmission Risk Low 0-14, Mod 15-19), High > 20: Readmission Risk Score: 23.4    Current PCP: Johana Weldon MD  PCP verified by CM? Yes    Chart Reviewed: Yes      History Provided by: Patient, Spouse  Patient Orientation: Alert and Oriented    Patient Cognition: Alert    Hospitalization in the last 30 days (Readmission):  No    If yes, Readmission Assessment in CM Navigator will be completed.    Advance Directives:      Code Status: Full Code   Patient's Primary Decision Maker is: Named in Scanned ACP Document    Primary Decision Maker: Tenzin Watts - Spouse - [phone]    Discharge Planning:    Patient lives with: Spouse/Significant Other Type of Home: House  Primary Care Giver: Self  Patient Support Systems include: Spouse/Significant Other, Children, Family Members   Current Financial resources: Medicare, Other (Comment) (BCBS secondary)  Current community resources: Other (Comment) (OP HD at [facility] [place], chair time 3pm)  Current services prior to admission: Durable Medical Equipment, Other (Comment) (OP HD at [facility] [place], chair time 3pm)            Current DME: Walker, Other (Comment) (shower bench, rolling walker)            Type of Home Care services:  None    ADLS  Prior functional level:  Independent in ADLs/IADLs  Current functional level: Independent in ADLs/IADLs    Family can provide assistance at DC: Yes  Would you like Case Management to discuss the discharge plan with any other family members/significant others, and if so, who? Yes ()  Plans to Return to Present Housing: Unknown at present  Other Identified Issues/Barriers to RETURNING to current housing: fractured hip  Potential Assistance needed at discharge: Skilled Nursing Facility, Other (Comment) (IPR vs SNF pending recovery from surgery)            Potential DME:    Patient expects to discharge to: Rehabilitation facility (IPR vs SNF, pending outcome of surgery)  Plan for transportation at discharge: Family    Financial    Payor: MEDICARE / Plan: MEDICARE PART A AND B / Product Type: *No Product type* /     Does insurance require precert for SNF: No    Potential assistance Purchasing Medications: No  Meds-to-Beds request: Yes      [id number] - [place], [place] - [address] - P [phone] - F [phone] [address]  [place] [address]  Phone: [phone] Fax: [phone]    [id number] - [place], [place] - [address] - P [phone] - F [phone]  [address]  [place] [address]  Phone: [phone] Fax: [phone]      Notes:    Factors facilitating achievement of predicted outcomes: Family support, Cooperative, Pleasant, Good insight into deficits, and Has needed Durable Medical Equipment at home    Barriers to discharge: Medical complications, Medication managment, and surgery recovery    Additional Case Management Notes: Admitted through ED after a fall, fracturing left femur neck. Hospitalist and Nephrology consulted for pre-op clearance. BUN 55, creatinine 6.2. Troponin 53 (baseline). BNP 1198.0. UA with moderate leukocytes. /100. IVF. Pain control.     Procedure:   3/11 Pelvic xray/hip xray Left: Acute displaced fracture of the neck of the left femur.     The Plan for Transition of Care

## 2024-03-11 NOTE — PROGRESS NOTES
03/11/24 1501   Encounter Summary   Encounter Overview/Reason  Spiritual/Emotional Needs   Service Provided For: Patient and family together   Referral/Consult From: Nurse   Support System Children;Spouse   Last Encounter  03/11/24   Complexity of Encounter Moderate   Begin Time 1440   End Time  1503   Total Time Calculated 23 min   Assessment/Intervention/Outcome   Assessment Coping   Intervention Explored/Affirmed feelings, thoughts, concerns   Outcome Concerns relieved   Plan and Referrals   Plan/Referrals Continue to visit, (comment)       ASSESSMENT  Mrs. Watts is 68-yr-old patient who has been scheduled for a back surgery this afternoon. Her spouse was with her. Her daughter informed me of the cause of the patient's spiritual distress, which is the surgery today. A non-hospital problem was her fall twice in the recent times. She already had a surgery some time ago. Still another surgery scheduled for today has been a vexing problem for her. She has serious concerns and distress. She is affiliated with a [employer] in [place]. Her support system include the [employer] and her family members. The patient's  and the daughter spoke to me a little bit about the patient's not asking help at home, which resulted in her falling.     INTERVENTION    . I admonished the patient and the family to help each other, especially in times of physical weakness. They agreed. I asked the patient as to what help they would need now. They suggested prayer for peace and God's help during the surgery.     I encouraged them to put their trust in God who is able to do all things for his children. I specifically prayed for the surgery that they said would take place this afternoon. I prayed for the surgeon and all providers at the time of surgery, especially for skills and wisdom to make it a successful surgery. I also prayed for a speedy recovery, and above all, the name of God be praised in all things.     They appreciated the prayer

## 2024-03-11 NOTE — ANESTHESIA POSTPROCEDURE EVALUATION
Department of Anesthesiology  Postprocedure Note    Patient: Deloris Watts  MRN: [id number]  YOB: 1955  Date of evaluation: 3/11/2024    Procedure Summary       Date: 03/11/24 Room / Location: [facility] OR  / [facility] OR    Anesthesia Start: 1540 Anesthesia Stop: 1740    Procedure: Left Misael Hip Arthroplasty (Left: Hip) Diagnosis:       Closed fracture of neck of left femur, initial encounter ([facility])      (Closed fracture of neck of left femur, initial encounter ([facility]) [S72.002A])    Surgeons: Virgilio Lezama DO Responsible Provider: Shmuel Brown MD    Anesthesia Type: general ASA Status: 3            Anesthesia Type: No value filed.    Cosmo Phase I:      Cosmo Phase II:      Anesthesia Post Evaluation    Patient participation: complete - patient participated  Level of consciousness: awake  Airway patency: patent  Nausea & Vomiting: no vomiting and no nausea  Cardiovascular status: hemodynamically stable  Respiratory status: acceptable and nasal cannula  Hydration status: stable  Pain management: adequate    No notable events documented.

## 2024-03-11 NOTE — PROGRESS NOTES
1737 Non reactive on arrival to PACU with Spontaneous resp , NPT and simple mask   1755 pt unchanged  1800 pt awake and looking around , NPT removed and O2 switched to NC , pt shivering , july jarquiner applied    1805 Shivering continues medicated with Demerol 12.5 mg IV   1820 resting resp easy   1842 pt awakens to name , no signs of pain noted   1847 meets criteria for discharge , transported to

## 2024-03-11 NOTE — CONSULTS
Hospitalist Consult History & Physical      Patient:  Deloris Watts  YOB: 1955  MRN: [id number]     Acct: [id number]        ASSESSMENT/PLAN:      Closed fracture of neck of left femur, initial encounter   Managed per primary        Pre-operative risk assessment  RCRI score is 3 due to having a history of heart failure, history of CVA, and pre-operative creatinine greater than 2.      Her Chf history has 60% EF - low concerns   Priam ry concern is ESRD and want to see Nephrology recommendations and encourage dialysis prior to surgery for metabolic optimization     ESRD on HD  Usually goes MWF - recommend dialysis prior to surgery for metabolic optimization   Nephrology consulted   Monitor on telemetry for any arrhythmias   Avoid nephrotoxic agents, meds renally dosed   Continue medications for electrolyte balance- sevelamer   Daily weights, strict I and O's   Renal diet once PO     Chronic CHFpEF  EF from echo one year go 60% with grade 1 diastolic dysfunction   No decompensation noticed on exam       Primary hypertension   Continue amlodipine     Elevated troponin   Likely baseline from ESRD  No chest pain endorsed     Hx of CVA  Holding anti-platelet agents; continue statin   Residual left sided raheel-paresis     HLD  statin    Anxiety and depression  Patient home meds say 450 mg wellbutrin daily  Starting 300 dose due to adverse effect concern with frail medical hx and ESRD          Thank you, Neymar Morton MD, for the consultation.  Hospitalist service will  continue to follow along.      Electronically signed by DENISSE Hernandez on 3/11/2024 at 2:50 AM      ===================================================================      Chief Complaint:  femur fracture     Date of Service: Pt seen/examined in consultation on 03/11/24.       History Of Present Illness:  Deloris Watts is a 68 y.o. female who we are asked to see/evaluate by Neymar Morton MD for medical management of the above  mouth daily as needed for Constipation    Shey New MD   calcitRIOL (ROCALTROL) 0.25 MCG capsule Take 1 capsule by mouth daily  Patient not taking: Reported on 3/11/2024    Shey New MD   simvastatin (ZOCOR) 20 MG tablet TAKE 1 TABLET BY MOUTH DAILY 6/1/23   Johana Weldon MD   sevelamer (RENVELA) 800 MG tablet Take 1 tablet by mouth Daily with lunch 5/1/23   Shey New MD   linaclotide (LINZESS) 145 MCG capsule Take 2 capsules by mouth daily as needed (constipation) PRN    Shey New MD   Omega-3 Fatty Acids (FISH OIL) 1360 MG CAPS Take 1,360 mg by mouth daily    Shey New MD   docusate sodium (COLACE) 100 MG capsule Take 1 capsule by mouth daily    Shey New MD   glucose monitoring (FREESTYLE FREEDOM) kit 1 kit by Does not apply route daily 3/9/23   Johana Weldon MD   glucose monitoring (FREESTYLE FREEDOM) kit 1 kit by Does not apply route daily 3/9/23   Johana Weldon MD   blood glucose monitor strips Test 1-2x/day. 3/9/23   Johana Weldon MD   Lancets MISC 1 each by Does not apply route 2 times daily 3/9/23   Johana Weldon MD   ondansetron (ZOFRAN-ODT) 4 MG disintegrating tablet Take 1 tablet by mouth 3 times daily as needed for Nausea or Vomiting 2/28/23   Johana Weldon MD   cetirizine (ZYRTEC) 5 MG tablet Take 1 tablet by mouth nightly as needed for Allergies 12/30/22   Bryn Potts DO   folic acid (FOLVITE) 400 MCG tablet Take 1 tablet by mouth daily    Shey New MD   aspirin 81 MG tablet Take 1 tablet by mouth daily    Shey New MD       Allergies:  Pioglitazone, Amoxicillin, Amoxicillin-pot clavulanate, Other, Ciprofloxacin, and Sulfa antibiotics    Social History:      The patient currently lives at home  TOBACCO:   reports that she has never smoked. She has never used smokeless tobacco.  ETOH:   reports no history of alcohol use.      Family History:        Problem

## 2024-03-11 NOTE — CONSULTS
[facility]    [address] [id number]  [phone]     Hospital Consult  3/11/2024 11:14 AM    Pt Name:    Deloris Watts  MRN:     [id number]   [id number]  YOB: 1955  Admit Date:    3/10/2024 11:37 PM  Primary Care Physician:  Johana Weldon MD        Reason for Consult: ESRD on HD. Pt has been on dialysis for 1 year secondary to HTN.      History:   The patient is a 68 y.o. female with PMH of ESRD on HD, CVA, CHF, HTN, HLD, anxiety/depression who presented to [facility] after a fall with complaint of left sided hip pain. Pt found to have closed displaced fracture of left femoral neck.     Past Medical History:  Past Medical History:   Diagnosis Date    Allergic rhinitis     Anemia in CKD (chronic kidney disease)     Anxiety     CHF (congestive heart failure) (HCC)     Closed fracture of right proximal humerus 09/18/2021    ORIF    Closed right ankle fracture     In 1990s; treated with casting    CVA (cerebral infarction)     in 1990s ; with left-sided weakness    Depression     Fibromyalgia     in 1990s    Headache(784.0)     Hemiplegia and hemiparesis following cerebral infarction affecting left non-dominant side (HCC)     in 1990s    Hydronephrosis 09/01/2023    Urogenital Implants, calculus of ureter, UTI    Hyperlipidemia     Hypertension     in 1980s    Irritable bowel syndrome     in 1990s    Kidney failure     Chronic Kidney Disease, Renal Dialysis started in 1/2023    Osteoporosis 2019    Unspecified cerebral artery occlusion with cerebral infarction     Unspecified sleep apnea     Wrist fracture, right 2018    Treated with casting       Past Surgical History:  Past Surgical History:   Procedure Laterality Date    CARPAL TUNNEL RELEASE Right     in 1990s    COLONOSCOPY  2012    [facility]    CT BIOPSY RENAL  12/28/2022    CT BIOPSY RENAL 12/28/2022 [facility] CT SCAN    CYSTOSCOPY Left 08/29/2023    CYSTOSCOPY, LEFT URETERAL STENT PLACEMENT performed by Edilson

## 2024-03-11 NOTE — BRIEF OP NOTE
Brief Postoperative Note      Patient: Deloris Watts  YOB: 1955  MRN: [id number]    Date of Procedure: 3/11/2024    Pre-Op Diagnosis Codes:     * Closed fracture of neck of left femur, initial encounter ([facility]) [S72.002A]    Post-Op Diagnosis: Same       Procedure(s):  Left Misael Hip Arthroplasty    Surgeon(s):  Virgilio Lezama DO    Assistant:  * No surgical staff found *    Anesthesia: General    Estimated Blood Loss (mL): less than 100     Complications: Hematoma    Specimens:   * No specimens in log *    Implants:  Implant Name Type Inv. Item Serial No.  Lot No. LRB No. Used Action   STEM FEM SZ 14 L160MM HIP TI POR STD OFFSET CEMENTLESS FOR - [id number]  STEM FEM SZ 14 L160MM HIP TI POR STD OFFSET CEMENTLESS FOR  [facility] [id number] Left 1 Implanted   SLEEVE UPLR NK L-3MM FEM CO CHROM 12/14 TAPR TNDM - [id number]  SLEEVE UPLR NK L-3MM FEM CO CHROM 12/14 TAPR TNDM  [facility] [id number] Left 1 Implanted   HEAD UPLR [id number] HIP CO CHROM FEM TNDM - [id number]  HEAD UPLR [id number] HIP CO CHROM FEM TNDM  [facility] [id number] Left 1 Implanted         Drains:   External Urinary Catheter (Active)       Findings: left femoral neck fracture      Electronically signed by Mikayla Doll PA-C on 3/11/2024 at 5:37 PM

## 2024-03-12 PROBLEM — S72.012A CLOSED SUBCAPITAL FRACTURE OF FEMUR, LEFT, INITIAL ENCOUNTER (HCC): Status: ACTIVE | Noted: 2024-03-12

## 2024-03-12 LAB
ANION GAP SERPL CALC-SCNC: 15 MEQ/L (ref 8–16)
BASOPHILS ABSOLUTE: 0.1 THOU/MM3 (ref 0–0.1)
BASOPHILS NFR BLD AUTO: 0.5 %
BUN SERPL-MCNC: 32 MG/DL (ref 7–22)
CALCIUM SERPL-MCNC: 8.7 MG/DL (ref 8.5–10.5)
CHLORIDE SERPL-SCNC: 99 MEQ/L (ref 98–111)
CO2 SERPL-SCNC: 20 MEQ/L (ref 23–33)
CREAT SERPL-MCNC: 4.2 MG/DL (ref 0.4–1.2)
DEPRECATED RDW RBC AUTO: 49.5 FL (ref 35–45)
EOSINOPHIL NFR BLD AUTO: 0 %
EOSINOPHILS ABSOLUTE: 0 THOU/MM3 (ref 0–0.4)
ERYTHROCYTE [DISTWIDTH] IN BLOOD BY AUTOMATED COUNT: 12.9 % (ref 11.5–14.5)
GFR SERPL CREATININE-BSD FRML MDRD: 11 ML/MIN/1.73M2
GLUCOSE BLD STRIP.AUTO-MCNC: 115 MG/DL (ref 70–108)
GLUCOSE BLD STRIP.AUTO-MCNC: 122 MG/DL (ref 70–108)
GLUCOSE BLD STRIP.AUTO-MCNC: 129 MG/DL (ref 70–108)
GLUCOSE SERPL-MCNC: 103 MG/DL (ref 70–108)
HCT VFR BLD AUTO: 34.6 % (ref 37–47)
HGB BLD-MCNC: 10.5 GM/DL (ref 12–16)
IMM GRANULOCYTES # BLD AUTO: 0.06 THOU/MM3 (ref 0–0.07)
IMM GRANULOCYTES NFR BLD AUTO: 0.6 %
LYMPHOCYTES ABSOLUTE: 1.1 THOU/MM3 (ref 1–4.8)
LYMPHOCYTES NFR BLD AUTO: 10 %
MCH RBC QN AUTO: 31.4 PG (ref 26–33)
MCHC RBC AUTO-ENTMCNC: 30.3 GM/DL (ref 32.2–35.5)
MCV RBC AUTO: 103.6 FL (ref 81–99)
MONOCYTES ABSOLUTE: 1.1 THOU/MM3 (ref 0.4–1.3)
MONOCYTES NFR BLD AUTO: 10.1 %
NEUTROPHILS NFR BLD AUTO: 78.8 %
NRBC BLD AUTO-RTO: 0 /100 WBC
PLATELET # BLD AUTO: 221 THOU/MM3 (ref 130–400)
PMV BLD AUTO: 11.1 FL (ref 9.4–12.4)
POTASSIUM SERPL-SCNC: 4.6 MEQ/L (ref 3.5–5.2)
RBC # BLD AUTO: 3.34 MILL/MM3 (ref 4.2–5.4)
SEGMENTED NEUTROPHILS ABSOLUTE COUNT: 8.5 THOU/MM3 (ref 1.8–7.7)
SODIUM SERPL-SCNC: 134 MEQ/L (ref 135–145)
WBC # BLD AUTO: 10.8 THOU/MM3 (ref 4.8–10.8)

## 2024-03-12 PROCEDURE — 85025 COMPLETE CBC W/AUTO DIFF WBC: CPT

## 2024-03-12 PROCEDURE — 97530 THERAPEUTIC ACTIVITIES: CPT

## 2024-03-12 PROCEDURE — 97110 THERAPEUTIC EXERCISES: CPT

## 2024-03-12 PROCEDURE — 80048 BASIC METABOLIC PNL TOTAL CA: CPT

## 2024-03-12 PROCEDURE — 99232 SBSQ HOSP IP/OBS MODERATE 35: CPT | Performed by: INTERNAL MEDICINE

## 2024-03-12 PROCEDURE — 2580000003 HC RX 258: Performed by: PHYSICIAN ASSISTANT

## 2024-03-12 PROCEDURE — 97162 PT EVAL MOD COMPLEX 30 MIN: CPT

## 2024-03-12 PROCEDURE — 97166 OT EVAL MOD COMPLEX 45 MIN: CPT

## 2024-03-12 PROCEDURE — 1200000000 HC SEMI PRIVATE

## 2024-03-12 PROCEDURE — 6360000002 HC RX W HCPCS: Performed by: PHYSICIAN ASSISTANT

## 2024-03-12 PROCEDURE — 99222 1ST HOSP IP/OBS MODERATE 55: CPT | Performed by: PHYSICAL MEDICINE & REHABILITATION

## 2024-03-12 PROCEDURE — 6370000000 HC RX 637 (ALT 250 FOR IP): Performed by: PHYSICIAN ASSISTANT

## 2024-03-12 PROCEDURE — 97535 SELF CARE MNGMENT TRAINING: CPT

## 2024-03-12 PROCEDURE — 36415 COLL VENOUS BLD VENIPUNCTURE: CPT

## 2024-03-12 PROCEDURE — 82948 REAGENT STRIP/BLOOD GLUCOSE: CPT

## 2024-03-12 PROCEDURE — 1200000003 HC TELEMETRY R&B

## 2024-03-12 RX ORDER — DOCUSATE SODIUM 100 MG/1
100 CAPSULE, LIQUID FILLED ORAL 2 TIMES DAILY
Status: DISCONTINUED | OUTPATIENT
Start: 2024-03-12 | End: 2024-03-14 | Stop reason: HOSPADM

## 2024-03-12 RX ADMIN — HYDROCODONE BITARTRATE AND ACETAMINOPHEN 2 TABLET: 5; 325 TABLET ORAL at 16:11

## 2024-03-12 RX ADMIN — ATORVASTATIN CALCIUM 10 MG: 10 TABLET, FILM COATED ORAL at 09:43

## 2024-03-12 RX ADMIN — CLOPIDOGREL BISULFATE 75 MG: 75 TABLET ORAL at 09:42

## 2024-03-12 RX ADMIN — HYDROCODONE BITARTRATE AND ACETAMINOPHEN 1 TABLET: 5; 325 TABLET ORAL at 02:43

## 2024-03-12 RX ADMIN — FOLIC ACID 500 MCG: 1 TABLET ORAL at 09:42

## 2024-03-12 RX ADMIN — POLYETHYLENE GLYCOL 3350 17 G: 17 POWDER, FOR SOLUTION ORAL at 16:11

## 2024-03-12 RX ADMIN — HYDROCODONE BITARTRATE AND ACETAMINOPHEN 2 TABLET: 5; 325 TABLET ORAL at 21:31

## 2024-03-12 RX ADMIN — SODIUM CHLORIDE, PRESERVATIVE FREE 10 ML: 5 INJECTION INTRAVENOUS at 09:55

## 2024-03-12 RX ADMIN — DOCUSATE SODIUM 100 MG: 100 CAPSULE, LIQUID FILLED ORAL at 21:31

## 2024-03-12 RX ADMIN — AMLODIPINE BESYLATE 5 MG: 5 TABLET ORAL at 09:43

## 2024-03-12 RX ADMIN — ONDANSETRON 4 MG: 4 TABLET, ORALLY DISINTEGRATING ORAL at 02:43

## 2024-03-12 RX ADMIN — HYDROMORPHONE HYDROCHLORIDE 0.5 MG: 1 INJECTION, SOLUTION INTRAMUSCULAR; INTRAVENOUS; SUBCUTANEOUS at 04:29

## 2024-03-12 RX ADMIN — HEPARIN SODIUM 5000 UNITS: 5000 INJECTION INTRAVENOUS; SUBCUTANEOUS at 21:31

## 2024-03-12 RX ADMIN — BUPROPION HYDROCHLORIDE 300 MG: 300 TABLET, FILM COATED, EXTENDED RELEASE ORAL at 09:42

## 2024-03-12 RX ADMIN — HYDROCODONE BITARTRATE AND ACETAMINOPHEN 2 TABLET: 5; 325 TABLET ORAL at 09:46

## 2024-03-12 RX ADMIN — MONTELUKAST SODIUM 10 MG: 10 TABLET ORAL at 09:44

## 2024-03-12 RX ADMIN — SEVELAMER CARBONATE 800 MG: 800 TABLET, FILM COATED ORAL at 12:16

## 2024-03-12 RX ADMIN — HEPARIN SODIUM 5000 UNITS: 5000 INJECTION INTRAVENOUS; SUBCUTANEOUS at 09:42

## 2024-03-12 RX ADMIN — TRAZODONE HYDROCHLORIDE 50 MG: 50 TABLET ORAL at 21:31

## 2024-03-12 ASSESSMENT — PAIN DESCRIPTION - LOCATION
LOCATION: HIP

## 2024-03-12 ASSESSMENT — PAIN DESCRIPTION - DESCRIPTORS
DESCRIPTORS: ACHING;DISCOMFORT;SHARP
DESCRIPTORS: ACHING
DESCRIPTORS: ACHING;DISCOMFORT;SHARP;SHOOTING

## 2024-03-12 ASSESSMENT — PAIN DESCRIPTION - ONSET
ONSET: ON-GOING
ONSET: ON-GOING

## 2024-03-12 ASSESSMENT — PAIN DESCRIPTION - PAIN TYPE
TYPE: SURGICAL PAIN
TYPE: ACUTE PAIN;SURGICAL PAIN

## 2024-03-12 ASSESSMENT — PAIN - FUNCTIONAL ASSESSMENT
PAIN_FUNCTIONAL_ASSESSMENT: INTOLERABLE, UNABLE TO DO ANY ACTIVE OR PASSIVE ACTIVITIES

## 2024-03-12 ASSESSMENT — PAIN SCALES - GENERAL
PAINLEVEL_OUTOF10: 10
PAINLEVEL_OUTOF10: 4
PAINLEVEL_OUTOF10: 8
PAINLEVEL_OUTOF10: 9
PAINLEVEL_OUTOF10: 8
PAINLEVEL_OUTOF10: 8
PAINLEVEL_OUTOF10: 4

## 2024-03-12 ASSESSMENT — PAIN DESCRIPTION - FREQUENCY
FREQUENCY: CONTINUOUS
FREQUENCY: CONTINUOUS

## 2024-03-12 ASSESSMENT — PAIN DESCRIPTION - ORIENTATION
ORIENTATION: LEFT

## 2024-03-12 ASSESSMENT — ENCOUNTER SYMPTOMS
DIARRHEA: 0
TROUBLE SWALLOWING: 1
COUGH: 1
RHINORRHEA: 0
BACK PAIN: 1
SHORTNESS OF BREATH: 0
NAUSEA: 0
EYE DISCHARGE: 0
ABDOMINAL PAIN: 0
SORE THROAT: 1
EYE PAIN: 0
VOMITING: 0
WHEEZING: 0
CONSTIPATION: 1

## 2024-03-12 NOTE — PROGRESS NOTES
Comprehensive Nutrition Assessment    Type and Reason for Visit:  Initial, Positive Nutrition Screen (Weight loss and decreased appetite)    Nutrition Recommendations/Plan:   Continue diet per provider and encourage adequate PO intake at best efforts  To consider renal diet if PO intake increases  Start ONS: Ensure compact (BID)   Recommend renal MVI - Folbee Plus  Reinforced renal diet education today (3/12)     Malnutrition Assessment:  Malnutrition Status:  Moderate malnutrition (03/12/24 1002)    Context:  Chronic Illness     Findings of the 6 clinical characteristics of malnutrition:  Energy Intake:  75% or less estimated energy requirements for 1 month or longer  Weight Loss:  Unable to assess (pt ESRD on HD - fluctuates frequently)     Body Fat Loss:  Mild body fat loss Orbital, Triceps, Fat Overlying Ribs   Muscle Mass Loss:   (moderate losses) Temples (temporalis), Clavicles (pectoralis & deltoids), Scapula (trapezius)  Fluid Accumulation:  Mild Extremities   Strength:  Not Performed    Nutrition Assessment:     Pt. moderately malnourished AEB criteria as listed above.  At risk for further nutrition compromise r/t closed fx of L femur nec - s/p L hip raheel (3/11), ESRD on HD, and underlying medical condition (PMHx: anxiety, CHF, CVA - residual L sided raheel-paresis, depression, HLD, HTN, IBS, osteoporosis, sleep apnea).        Nutrition Related Findings:    Pt. Report/Treatments/Miscellaneous: Pt seen, endorses that she has had decreased appetite recently. Pt has had ongoing weight fluctuations r/t HD, note pt also with hx/o CHF. Pt states that she does take a phosphorus binder prior to eating - encouraged continued compliance. Discussed renal diet restrictions - pt familiar but needing reinforcement. Pt amendable to ONS use - will add Ensure compact (BID) as [facility] currently is out of Nepro.    GI Status: BM 3/7, hypoactive BS noted per EMR  Pertinent Labs: Na 134, BUN 32, Creatinine 4.2, elevated BNP  (3/11), last A1C 5.4% (12/22/23)  Pertinent Meds: lipitor, folic acid, heparin, humalog, renvela, norco, zofran, glycolax     Wound Type:  (hip incision)       Current Nutrition Intake & Therapies:    Average Meal Intake: 1-25% (x1)  Average Supplements Intake: None Ordered (inititated today)  ADULT DIET; Regular  ADULT ORAL NUTRITION SUPPLEMENT; Breakfast, Lunch; Standard 4 oz Oral Supplement    Anthropometric Measures:  Height: 152.4 cm (5')  Ideal Body Weight (IBW): 100 lbs (45 kg)    Admission Body Weight: 45.4 kg (100 lb) (3/10: stated weight)  Current Body Weight: 50 kg (110 lb 3.7 oz) (3/12: LLE edema)  Current BMI (kg/m2): 21.5  Usual Body Weight:  (per EMR: 4/28/23: 102# 15 oz, 8/1/23: 102# 10 oz, 12/18/23: 93# 11 oz, 1/5/24: 93# 11 oz)     Weight Adjustment For: No Adjustment                 BMI Categories: Underweight (BMI less than 22) age over 65    Estimated Daily Nutrient Needs:  Energy Requirements Based On: Kcal/kg  Weight Used for Energy Requirements: Current (50 kg)  Energy (kcal/day): 0769-1336 kcals (25-30 kcals/kg)  Weight Used for Protein Requirements: Current (50 kg)  Protein (g/day): 60-75 grams (1.2-1.5 grams/kg) ESRD on HD       Nutrition Diagnosis:   Moderate malnutrition, In context of chronic illness related to inadequate protein-energy intake as evidenced by intake 0-25%, poor intake prior to admission, dialysis, moderate muscle loss, mild loss of subcutaneous fat    Nutrition Interventions:   Food and/or Nutrient Delivery: Continue Current Diet, Start Oral Nutrition Supplement  Nutrition Education/Counseling: Education initiated (reinforced renal diet compliance, encouraged adequate PO intake at best efforts, discussed high protein sources, discussed ONS use)  Coordination of Nutrition Care: Continue to monitor while inpatient       Goals:     Goals: Meet at least 75% of estimated needs, by next RD assessment       Nutrition Monitoring and Evaluation:      Food/Nutrient Intake

## 2024-03-12 NOTE — PROGRESS NOTES
Orthopaedic Progress Note      SUBJECTIVE   Ms. Watts is post op day # 1L hip hemiarthroplasty    Seen at bedside this morning  no adverse events overnight  Pain well controlled, advanced diet  Denies any numbness/paresthesia in operative extremity  To mobilize with therapy today  No chest pain, sob, dizziness, nausea         OBJECTIVE      Physical    VITALS:  BP (!) 144/67   Pulse 98   Temp 98.4 °F (36.9 °C) (Oral)   Resp 16   Ht 1.524 m (5')   Wt 50 kg (110 lb 3.7 oz)   SpO2 94%   BMI 21.53 kg/m²   I/O last 3 completed shifts:  In: 1776.9 [P.O.:240; I.V.:1082.7; IV Piggyback:54.2]  Out: 700 [Urine:300]    4/10 pain  Gen: alert and oriented to name  location year, conversationally appropriate with injury and surgery  Head: normorcephalic, atraumatic  Resp: unlabored, room air  Pelvis: stable  LLE: aquacel intact, minimal strikethrough  Abd pillow intact  Sensation to light touch intact  Gastroc TA EHL intact  Distal pulses palpable, warm well perfused  Calf supple nontender to palpation       Data  CBC:   Lab Results   Component Value Date/Time    WBC 10.8 2024 06:37 AM    HGB 10.5 2024 06:37 AM     2024 06:37 AM     BMP:    Lab Results   Component Value Date/Time     2024 06:37 AM    K 4.6 2024 06:37 AM    K 4.3 2024 12:10 AM    CL 99 2024 06:37 AM    CO2 20 2024 06:37 AM    BUN 32 2024 06:37 AM    CREATININE 4.2 2024 06:37 AM    CALCIUM 8.7 2024 06:37 AM    GLUCOSE 103 2024 06:37 AM    GLUCOSE 81 2017 08:50 AM     Uric Acid:  No components found for: \"URIC\"  PT/INR:    Lab Results   Component Value Date/Time    INR 0.77 2024 12:10 AM     Troponin:    Lab Results   Component Value Date/Time    TROPONINI <0.006 2013 08:24 PM     Urine Culture:  No components found for: \"CURINE\"      Current Inpatient Medications    Current Facility-Administered Medications: HYDROmorphone (DILAUDID) injection 0.25 mg,

## 2024-03-12 NOTE — PROGRESS NOTES
Hospitalist Progress Note    Patient:  Deloris Watts    YOB: 1955  Unit/Bed:7K-07/007-A  Date of Admission: 3/10/2024  Code Status: Full Code      Assessment/Plan:    Left femur fracture: S/p left hip arthroplasty 3/11.  Management per primary    ESRD on HD: Nephrology following and managing    Chronic HFpEF: Echo 05/2023 EF 60%, G1DD.  No decompensation.    Essential hypertension: stable, cont amlodipine.     History of CVA: cont Plavix, statin.     Anxiety/depression: cont home meds     Patient is medically stable from Hospitalist standpoint. Nephrology following and managing ESRD. Will sign off. Please contact for any questions or concerns.     Chief Complaint: femur fracture      HPI / Hospital Course:  69 yo F with PMHx ESRD, HTN, CVA, who presented to [facility] ED for left hip fracture s/p fall. Hospitalist consulted for pre-op eval and med management. S/p left hip arthroplasty 3/11. Neph following for ESRD.     Subjective (past 24 hours): Pt is doing well. Reports hip pain, overall controlled. No f/c, sob, cp, abd pain, nvd.        Diet: ADULT DIET; Regular  ADULT ORAL NUTRITION SUPPLEMENT; Breakfast, Lunch; Standard 4 oz Oral Supplement  ROS: Pertinent positives as noted in HPI. All other systems reviewed and negative.    Past medical history, family history, social history and allergies reviewed again and is unchanged since admission.    Exam:  /66   Pulse 91   Temp 98 °F (36.7 °C) (Oral)   Resp 16   Ht 1.524 m (5')   Wt 50 kg (110 lb 3.7 oz)   SpO2 98%   BMI 21.53 kg/m²   General:   Pleasant female. NAD.   HEENT:  normocephalic and atraumatic.  No scleral icterus. PERR.  Neck: supple.  No JVD. No thyromegaly.  Lungs: clear to auscultation. No retractions  Cardiac: RRR without murmur.  Abdomen: soft.  Nontender.  Bowel sounds positive.  Extremities:  No clubbing, cyanosis, or edema x 4.    Vasculature: capillary refill < 3 seconds. Palpable LE pulses bilaterally.  Skin:   hip arthroplasty. Comparison: CR/SR - XR HIP 2-3 VW W PELVIS LEFT - 03/11/2024 12:19 AM EDT Findings: Interval ORIF of the left hip. Left hip arthroplasty in close anatomic alignment. Subcutaneous emphysema in the soft tissue from recent surgery. Pelvic phleboliths.     Impression: Interval left hip arthroplasty, in close anatomic alignment. This document has been electronically signed by: Nicanor Smith MD on 03/11/2024 09:20 PM    XR CHEST 1 VIEW    Result Date: 3/11/2024  Chest one view COMPARISON: 02/13/2024 FINDINGS: No pulmonary consolidation, pleural effusion or pneumothorax is seen. The cardiac silhouette is normal in size. The right central venous catheter tip is in the right atrium. Lower cervical fixation hardware is noted. The patient is post ORIF of the proximal right humerus, partially imaged. The patient is post L2 vertebroplasty.     No acute disease. This document has been electronically signed by: Gareth Fischer MD on 03/11/2024 02:05 AM    XR HIP 2-3 VW W PELVIS LEFT    Result Date: 3/11/2024  Left hip two views, pelvis one view COMPARISON: No prior FINDINGS: There is an acute displaced fracture of the neck of the left femur. The remaining osseous and articular structures are intact. There is a rectangular opacity projected over the proximal right hip, likely outside the patient.     Acute displaced fracture of the neck of the left femur. This document has been electronically signed by: Gareth Fischer MD on 03/11/2024 01:55 AM    XR FEMUR LEFT (MIN 2 VIEWS)    Result Date: 3/11/2024  Left femur two views COMPARISON: No prior FINDINGS: There is an acute displaced fracture of the neck of the left femur. The remaining osseous and articular structures are intact.     Acute displaced fracture of the neck of the left femur. This document has been electronically signed by: Gareth Fischer MD on 03/11/2024 01:55 AM    CT CERVICAL SPINE WO CONTRAST    Result Date: 3/11/2024  CT cervical spine without contrast COMPARISON:

## 2024-03-12 NOTE — PROGRESS NOTES
[facility]  INPATIENT OCCUPATIONAL THERAPY  [facility] 7K  EVALUATION    Time:   Time In: 1311  Time Out: 1405  Timed Code Treatment Minutes: 46 Minutes  Minutes: 54          Date: 3/12/2024  Patient Name: Deloris Watts,   Gender: female      MRN: [id number]  : 1955  (68 y.o.)  Referring Practitioner: Mikayla Doll PA-C  Diagnosis: ESRD on hemodialysis  Additional Pertinent Hx: Per H&P, \"The patient is a 68 y.o. female with a past medical history of ESRD on hemodialysis every MWF, CHF, CVA, hypertension, and hyperlipidemia who presented to [facility] ED following a fall.  Patient is a walker assisted ambulator at baseline. She endorsed last evening she was standing in her bathroom when she lost her balance and fell backwards and landed on her left hip.  She did endorse bumping her head but denied any loss of consciousness.  She is on aspirin and Plavix.  No anticoagulation reported.  Patient endorses following the fall she had immediate and severe pain in her left hip.  She was unable to ambulate. She was found to have femoral neck fracture and orthopedic surgery was consulted.  Upon assessment this morning patient stated she had significant pain in her left hip.  She denied any other acute pain or complaints.  She denies any headaches, lightheadedness, dizziness, neck pain, back pain, chest pain, shortness of breath, abdominal pain, nausea/vomiting, other pain in extremities, and paresthesias.  Plain films of the left hip reviewed a acute displaced fracture of the left femoral neck.  CT head and cervical spine negative for any acute traumatic injuries.  Chest x-ray unremarkable.  Labs and vital signs reviewed.  Patient afebrile, vital signs stable.  No leukocytosis.  Hemoglobin 12.8.  Creatinine 6.2.  None since surgery troponin elevated at 53 but has previously been 54 and 58 on 2024.  Patient's admitted under orthopedic surgery with consults placed to hospitalist for preop  facilitation of tasks to increase safety and independence with ADL's for improved functional independence and quality of life.    Discharge Recommendations:  Continue to assess pending progress, IP Rehab    Patient Education:        Patient Education  Education Given To: Patient  Education Provided: Role of Therapy;Plan of Care;Precautions;ADL Adaptive Strategies;Transfer Training;Equipment  Education Method: Verbal;Demonstration  Barriers to Learning: Cognition  Education Outcome: Continued education needed    Equipment Recommendations:  Equipment Needed: Yes  Other: LHAE    Plan:  Times Per Week: 6x  Times Per Day: Once a day  Current Treatment Recommendations: Strengthening, Balance training, Functional mobility training, Endurance training, Safety education & training, Self-Care / ADL, Home management training, Patient/Caregiver education & training, Equipment evaluation, education, & procurement.  See long-term goal time frame for expected duration of plan of care.  If no long-term goals established, a short length of stay is anticipated.    Goals:  Patient goals : Go Home with spouse  Short Term Goals  Time Frame for Short Term Goals: Until discharge  Short Term Goal 1: Pt will complete LB dressing with LHAE and min A to increase occuypational preformance in home environment while maintaining hip precautions.  Short Term Goal 2: Pt will complete functional standing task x 3 minutes with 1 UE release and CGA to increase indep and endurance with all sinkside grooming and clothing management.  Short Term Goal 3: Pt will navigate to/from BR with CGA and min vcs for safety to increase indep and endurance with all toileting.  Short Term Goal 4: Pt will preform full body bathing with min A and AE PRN to increase indep in home environment.  Short Term Goal 5: Pt will complete BUE strengthening exercise HEP with min vcs for technique x 10 reps to increase indep with self cares.  Additional Goals?: No    AM-PAC

## 2024-03-12 NOTE — PROGRESS NOTES
Recommendations: IP Rehab    Patient Education:      .    Patient Education  Education Given To: Patient, Family  Education Provided: Role of Therapy, Plan of Care, Precautions  Education Method: Verbal  Barriers to Learning: Cognition  Education Outcome: Verbalized understanding, Continued education needed       Equipment Recommendations:  Equipment Needed: No    Plan:  Current Treatment Recommendations: Strengthening, Balance training, Functional mobility training, Transfer training, Gait training, Safety education & training, Patient/Caregiver education & training, Therapeutic activities, Positioning  General Plan:  (6xO)    Goals:  Patient Goals : Pt goal to get better  Short Term Goals  Time Frame for Short Term Goals: By discharge  Short Term Goal 1: Supine to/from sit at Min A in order to get in/out of bed.  Short Term Goal 2: Sit to/from stand at CGA in order to get up to walk.  Short Term Goal 3: Ambulate 25 feet with RW at CGA in order to walk to/from bathroom.  Long Term Goals  Time Frame for Long Term Goals : NA due to short ELOS    Following session, patient left in safe position with all fall risk precautions in place.

## 2024-03-12 NOTE — CARE COORDINATION
3/12/24, 3:07 PM EDT    DISCHARGE ON GOING EVALUATION    Deloris L Watts       [facility] day: 1  Location: 7-07/007-A Reason for admit: ESRD on hemodialysis (HCC) [N18.6, Z99.2]  Closed fracture of neck of left femur, initial encounter ([facility]) [S72.002A]  Closed subcapital fracture of femur, left, initial encounter ([facility]) [S72.012A]  Closed displaced fracture of left femoral neck ([facility]) [S72.002A]     Procedure:   3/11 Pelvic XR: Acute displaced fx of the neck of left femur.   3/11 OR: Left hip hemiarthroplasty     Barriers to Discharge: 1 POD. Ortho surgery, hospitalist and nephrology following. PT/OT. Daily labs and IP HD. IVF. PRN IV Zofran required today. PO pain management PRN.    PCP: Johana Weldon MD  Readmission Risk Score: 28.2%    Patient Goals/Plan/Treatment Preferences: From home with . Current with OP HD at [facility] MWF @ 1500. Has RW and shower bench. [facility], [facility], second choice is [facility]. SW and PM&R consulted after speaking with [facility].                  negative

## 2024-03-12 NOTE — PROGRESS NOTES
[facility]    [address] [address]  [phone]     Hospital Progress Note  3/12/2024 9:18 AM    Pt Name:    Deloris Watts  MRN:     [id number]   [id number]  YOB: 1955  Admit Date:    3/10/2024 11:37 PM  Primary Care Physician:  Johana Weldon MD        Reason for Consult: ESRD on HD. Pt has been on dialysis for 1 year secondary to HTN.      History:   The patient is a 68 y.o. female with PMH of ESRD on HD, CVA, CHF, HTN, HLD, anxiety/depression who presented to [facility] after a fall with complaint of left sided hip pain. Pt found to have closed displaced fracture of left femoral neck.     Subjective: Pt seen and examined at the bedside with  present. Feeling okay, pain well managed. Denies any complaints.     Past Medical History:  Past Medical History:   Diagnosis Date    Allergic rhinitis     Anemia in CKD (chronic kidney disease)     Anxiety     CHF (congestive heart failure) (HCC)     Closed fracture of right proximal humerus 09/18/2021    ORIF    Closed right ankle fracture     In 1990s; treated with casting    CVA (cerebral infarction)     in 1990s ; with left-sided weakness    Depression     Fibromyalgia     in 1990s    Headache(784.0)     Hemiplegia and hemiparesis following cerebral infarction affecting left non-dominant side (HCC)     in 1990s    Hydronephrosis 09/01/2023    Urogenital Implants, calculus of ureter, UTI    Hyperlipidemia     Hypertension     in 1980s    Irritable bowel syndrome     in 1990s    Kidney failure     Chronic Kidney Disease, Renal Dialysis started in 1/2023    Osteoporosis 2019    Unspecified cerebral artery occlusion with cerebral infarction     Unspecified sleep apnea     Wrist fracture, right 2018    Treated with casting       Past Surgical History:  Past Surgical History:   Procedure Laterality Date    CARPAL TUNNEL RELEASE Right     in 1990s    COLONOSCOPY  2012    [facility]    CT BIOPSY RENAL  12/28/2022     chronically ill appearing, tremors noted  Eyes: no icteric sclera, no pallor conjunctiva, no discharge seen from either eye  Ears and Nose: normal external appearance of left and right ear and nose.  No active drainage from nose.   Oral: moist oral mucus membranes  Neck: No jugular venous distention, appears symmetric, good ROM  Lungs: Air entry B/L, no crackles or rales, no use of accessory muscles  Heart: regular rate, S1, S2  Extremities: no LE edema  GI: soft, non-tender, no guarding  Skin: no rash seen on exposed extremities  Musculo: moves all extremities  Neuro: no slurred speech, no facial drooping, no asterixis  Psychiatric: Awake, alert, Oriented    Lab Data  CBC:   Recent Labs     03/11/24  0010 03/11/24  0824 03/12/24  0637   WBC 6.9 8.9 10.8   HGB 12.8 12.0 10.5*   HCT 40.9 38.9 34.6*    232 221       BMP:  Recent Labs     03/11/24  0010 03/11/24  0824 03/12/24  0637    142 134*   K 4.3 4.5 4.6   CL 98 101 99   CO2 26 27 20*   BUN 55* 57* 32*   CREATININE 6.2* 6.3* 4.2*   GLUCOSE 93 114* 103   CALCIUM 9.2 9.1 8.7         Recent Labs     03/11/24  0010   LABALBU 4.2   AST 22   ALT 16   BILITOT 0.2*   ALKPHOS 155*       Old labs reviewed.     Impression and Plan:  ESRD on HD: does dialysis MWF. Received dialysis today prior to orthopedic intervention. Continue scheduled dialysis.  Electrolytes: WNL, continue to monitor.   Closed Fracture of Left Femoral Head: PO day 1. management per primary  HFpEF: last echo 1 year ago demonstrated LVEF 60% with grade one diastolic.  HLD: on statin  Hx of CVA: noted   Anxiety/Depression    Thank you for allowing me to participate in the care of this patient. Please feel free to call me if you have any questions.     Electronically signed by Aureliano Landers DO on 3/11/24 at 11:01 AM EDT    Case and plan discussed with Dr. Cuadra  [facility]

## 2024-03-12 NOTE — CONSULTS
bedside chair in sitting position. Pt able to scoot to EOB in sitting position at Min A of 1 with extra time needed.      Transfers:  Sit to Stand: Minimal Assistance, X 1, cues for hand placement, from edge of bed. Pt with wide DENNIS and forward flexed posture.   Stand to Sit:Minimal Assistance, to control descent into the bedside chair. Needed cues for hand placement. Pt had difficulty scooting back into chair.      Ambulation:  Minimal Assistance, X 1  Distance: 2 feet from bed to chair.   Surface: Level Tile  Device:Rolling Walker  Gait Deviations:  Pt with very slowed elizabeth, wide DENNIS with short, shuffling steps. Increased reliance on walker and cues needed to fully line herself up to chair prior to sitting down. Limited by pain.         OT:    (3/12/2024) :  Timed Code Treatment Minutes: 46 Minutes   Activity Tolerance:  Patient tolerance of  treatment: Good treatment tolerance, Limited by pain, Limited by fatigue, and Reduced activity pace      COGNITION: Slow Processing, Decreased Problem Solving, Decreased Safety Awareness, Impulsive, and pt appears to be anxious with activity.      ADL:   Feeding: Set up consume ice chips while sitting in recliner. Pt required increased time to open spoon from package. Pt able to load spoon and bring to mouth. Decreased  strength noted with task.   Footwear Management: Maximum Assistance, with verbal cues , and with increased time for completion.  To doff/don R sock while sitting in recliner. Pt educated on sock aide and reacher use (reacher was not present during session) . Pt required assist to don sock on sock aide, place on foot, and pull. Pt would benefit from further education on LHAE.      *vended patient hip arthroplasty handout to increase indep with self cares and maintain precautions. Pt unable to verbalize hip precautions without reading off of board.      BALANCE:  Sitting Balance:  Contact Guard Assistance, with cues for safety. D/t increased pain patient  Time: 03/12/24  2:46 AM   Result Value Ref Range    POC Glucose 122 (H) 70 - 108 mg/dl   Basic Metabolic Panel    Collection Time: 03/12/24  6:37 AM   Result Value Ref Range    Sodium 134 (L) 135 - 145 meq/L    Potassium 4.6 3.5 - 5.2 meq/L    Chloride 99 98 - 111 meq/L    CO2 20 (L) 23 - 33 meq/L    Glucose 103 70 - 108 mg/dL    BUN 32 (H) 7 - 22 mg/dL    Creatinine 4.2 (HH) 0.4 - 1.2 mg/dL    Calcium 8.7 8.5 - 10.5 mg/dL   CBC auto differential    Collection Time: 03/12/24  6:37 AM   Result Value Ref Range    WBC 10.8 4.8 - 10.8 thou/mm3    RBC 3.34 (L) 4.20 - 5.40 mill/mm3    Hemoglobin 10.5 (L) 12.0 - 16.0 gm/dl    Hematocrit 34.6 (L) 37.0 - 47.0 %    .6 (H) 81.0 - 99.0 fL    MCH 31.4 26.0 - 33.0 pg    MCHC 30.3 (L) 32.2 - 35.5 gm/dl    RDW-CV 12.9 11.5 - 14.5 %    RDW-SD 49.5 (H) 35.0 - 45.0 fL    Platelets 221 130 - 400 thou/mm3    MPV 11.1 9.4 - 12.4 fL    Seg Neutrophils 78.8 %    Lymphocytes 10.0 %    Monocytes 10.1 %    Eosinophils 0.0 %    Basophils 0.5 %    Immature Granulocytes 0.6 %    Segs Absolute 8.5 (H) 1.8 - 7.7 thou/mm3    Lymphocytes Absolute 1.1 1.0 - 4.8 thou/mm3    Monocytes Absolute 1.1 0.4 - 1.3 thou/mm3    Eosinophils Absolute 0.0 0.0 - 0.4 thou/mm3    Basophils Absolute 0.1 0.0 - 0.1 thou/mm3    Immature Grans (Abs) 0.06 0.00 - 0.07 thou/mm3    nRBC 0 /100 wbc   Anion Gap    Collection Time: 03/12/24  6:37 AM   Result Value Ref Range    Anion Gap 15.0 8.0 - 16.0 meq/L   Glomerular Filtration Rate, Estimated    Collection Time: 03/12/24  6:37 AM   Result Value Ref Range    Est, Glom Filt Rate 11 (A) >60 ml/min/1.73m2   POCT Glucose    Collection Time: 03/12/24 10:43 AM   Result Value Ref Range    POC Glucose 115 (H) 70 - 108 mg/dl        Latest Reference Range & Units 03/11/24 00:10 03/11/24 08:24 03/12/24 06:37   Sodium 135 - 145 meq/L 140 142 134 (L)   Potassium 3.5 - 5.2 meq/L 4.3 4.5 4.6   Chloride 98 - 111 meq/L 98 101 99   CO2 23 - 33 meq/L 26 27 20 (L)   BUN,BUNPL 7 - 22

## 2024-03-12 NOTE — OP NOTE
[address] [address]                            OPERATIVE REPORT      PATIENT NAME: HANS HEWITT               : 1955  MED REC NO: [id number]                       ROOM: [facility]  ACCOUNT NO: [id number]                       ADMIT DATE: 03/10/2024  PROVIDER: Virgilio Lezama DO      DATE OF PROCEDURE:  2024    SURGEON:  Virgilio Lezama DO    PREOPERATIVE DIAGNOSIS:  Left displaced femoral neck fracture.    POSTOPERATIVE DIAGNOSIS:  Left displaced femoral neck fracture.    OPERATION PERFORMED:  Left hip hemiarthroplasty.    ASSISTANT:  Mikayla Doll PA-C.  She assisted with positioning, retraction, closure, and dressing application.    TYPE OF ANESTHESIA:  General.    BLOOD LOSS:  150 mL.    IMPLANTS:  [employer] Synergy porous-coated size 14 stem, unipolar 44 mm -3 head.    INDICATION FOR OPERATION:  The patient is a 68-year-old female, who presented to the ER with a displaced femoral neck fracture.  After medical optimization and risk stratification, she and the family elected to proceed with hip hemiarthroplasty.  Risks, benefits, and alternatives were reviewed in depth prior to the procedure.    DESCRIPTION OF PROCEDURE:  The patient was met in the preoperative holding area.  The correct extremity was identified and marked.  Informed consent was obtained.  The patient was escorted to the operative suite and general anesthesia was administered.  She was prepped and draped in standard surgical fashion.  Time-out was taken.  Correct patient, procedure, and operative site agreed upon by all who were present.  The patient was positioned in the lateral position.  I then made an incision over the posterior third of the greater trochanter extending posteriorly over the gluteus.  Bovie dissection was carried down, and IT band and gluteal fascia were identified.  I then opened the IT band and then split the gluteal

## 2024-03-13 LAB
ALBUMIN SERPL BCG-MCNC: 3.2 G/DL (ref 3.5–5.1)
ALP SERPL-CCNC: 126 U/L (ref 38–126)
ALT SERPL W/O P-5'-P-CCNC: 12 U/L (ref 11–66)
ANION GAP SERPL CALC-SCNC: 18 MEQ/L (ref 8–16)
AST SERPL-CCNC: 20 U/L (ref 5–40)
BASOPHILS ABSOLUTE: 0.1 THOU/MM3 (ref 0–0.1)
BASOPHILS NFR BLD AUTO: 0.7 %
BILIRUB SERPL-MCNC: 0.3 MG/DL (ref 0.3–1.2)
BUN SERPL-MCNC: 47 MG/DL (ref 7–22)
CALCIUM SERPL-MCNC: 8.6 MG/DL (ref 8.5–10.5)
CHLORIDE SERPL-SCNC: 98 MEQ/L (ref 98–111)
CO2 SERPL-SCNC: 17 MEQ/L (ref 23–33)
CREAT SERPL-MCNC: 5.5 MG/DL (ref 0.4–1.2)
DEPRECATED RDW RBC AUTO: 51.8 FL (ref 35–45)
EOSINOPHIL NFR BLD AUTO: 0.6 %
EOSINOPHILS ABSOLUTE: 0.1 THOU/MM3 (ref 0–0.4)
ERYTHROCYTE [DISTWIDTH] IN BLOOD BY AUTOMATED COUNT: 13.1 % (ref 11.5–14.5)
GFR SERPL CREATININE-BSD FRML MDRD: 8 ML/MIN/1.73M2
GLUCOSE BLD STRIP.AUTO-MCNC: 109 MG/DL (ref 70–108)
GLUCOSE SERPL-MCNC: 112 MG/DL (ref 70–108)
HCT VFR BLD AUTO: 34.7 % (ref 37–47)
HGB BLD-MCNC: 10.3 GM/DL (ref 12–16)
IMM GRANULOCYTES # BLD AUTO: 0.1 THOU/MM3 (ref 0–0.07)
IMM GRANULOCYTES NFR BLD AUTO: 1.2 %
LYMPHOCYTES ABSOLUTE: 1.1 THOU/MM3 (ref 1–4.8)
LYMPHOCYTES NFR BLD AUTO: 13 %
MCH RBC QN AUTO: 32.2 PG (ref 26–33)
MCHC RBC AUTO-ENTMCNC: 29.7 GM/DL (ref 32.2–35.5)
MCV RBC AUTO: 108.4 FL (ref 81–99)
MONOCYTES ABSOLUTE: 1 THOU/MM3 (ref 0.4–1.3)
MONOCYTES NFR BLD AUTO: 11.6 %
NEUTROPHILS NFR BLD AUTO: 72.9 %
NRBC BLD AUTO-RTO: 0 /100 WBC
PLATELET # BLD AUTO: 167 THOU/MM3 (ref 130–400)
PMV BLD AUTO: 10.9 FL (ref 9.4–12.4)
POTASSIUM SERPL-SCNC: 4.4 MEQ/L (ref 3.5–5.2)
PROT SERPL-MCNC: 6.1 G/DL (ref 6.1–8)
RBC # BLD AUTO: 3.2 MILL/MM3 (ref 4.2–5.4)
SEGMENTED NEUTROPHILS ABSOLUTE COUNT: 6.2 THOU/MM3 (ref 1.8–7.7)
SODIUM SERPL-SCNC: 133 MEQ/L (ref 135–145)
WBC # BLD AUTO: 8.5 THOU/MM3 (ref 4.8–10.8)

## 2024-03-13 PROCEDURE — 6370000000 HC RX 637 (ALT 250 FOR IP): Performed by: PHYSICAL MEDICINE & REHABILITATION

## 2024-03-13 PROCEDURE — 6370000000 HC RX 637 (ALT 250 FOR IP): Performed by: PHYSICIAN ASSISTANT

## 2024-03-13 PROCEDURE — 97535 SELF CARE MNGMENT TRAINING: CPT

## 2024-03-13 PROCEDURE — 97530 THERAPEUTIC ACTIVITIES: CPT

## 2024-03-13 PROCEDURE — 6360000002 HC RX W HCPCS: Performed by: PHYSICIAN ASSISTANT

## 2024-03-13 PROCEDURE — 90935 HEMODIALYSIS ONE EVALUATION: CPT | Performed by: INTERNAL MEDICINE

## 2024-03-13 PROCEDURE — 36415 COLL VENOUS BLD VENIPUNCTURE: CPT

## 2024-03-13 PROCEDURE — 82948 REAGENT STRIP/BLOOD GLUCOSE: CPT

## 2024-03-13 PROCEDURE — 1200000000 HC SEMI PRIVATE

## 2024-03-13 PROCEDURE — 80053 COMPREHEN METABOLIC PANEL: CPT

## 2024-03-13 PROCEDURE — 2580000003 HC RX 258: Performed by: PHYSICIAN ASSISTANT

## 2024-03-13 PROCEDURE — 90935 HEMODIALYSIS ONE EVALUATION: CPT

## 2024-03-13 PROCEDURE — 85025 COMPLETE CBC W/AUTO DIFF WBC: CPT

## 2024-03-13 RX ORDER — SENNOSIDES A AND B 8.6 MG/1
2 TABLET, FILM COATED ORAL NIGHTLY
Status: DISCONTINUED | OUTPATIENT
Start: 2024-03-13 | End: 2024-03-14 | Stop reason: HOSPADM

## 2024-03-13 RX ORDER — SENNOSIDES A AND B 8.6 MG/1
2 TABLET, FILM COATED ORAL NIGHTLY
Qty: 60 TABLET | Refills: 0 | Status: ON HOLD
Start: 2024-03-13 | End: 2024-04-12

## 2024-03-13 RX ORDER — HYDROCODONE BITARTRATE AND ACETAMINOPHEN 5; 325 MG/1; MG/1
1 TABLET ORAL EVERY 6 HOURS PRN
Qty: 28 TABLET | Refills: 0 | Status: ON HOLD | OUTPATIENT
Start: 2024-03-13 | End: 2024-03-20

## 2024-03-13 RX ORDER — HEPARIN SODIUM 5000 [USP'U]/ML
5000 INJECTION, SOLUTION INTRAVENOUS; SUBCUTANEOUS 2 TIMES DAILY
Qty: 42 ML | Refills: 0 | Status: ON HOLD
Start: 2024-03-13 | End: 2024-04-03

## 2024-03-13 RX ORDER — BISACODYL 10 MG
10 SUPPOSITORY, RECTAL RECTAL DAILY PRN
Status: DISCONTINUED | OUTPATIENT
Start: 2024-03-13 | End: 2024-03-14 | Stop reason: HOSPADM

## 2024-03-13 RX ADMIN — SENNOSIDES 17.2 MG: 8.6 TABLET, FILM COATED ORAL at 20:43

## 2024-03-13 RX ADMIN — MONTELUKAST SODIUM 10 MG: 10 TABLET ORAL at 11:58

## 2024-03-13 RX ADMIN — HYDROCODONE BITARTRATE AND ACETAMINOPHEN 2 TABLET: 5; 325 TABLET ORAL at 20:43

## 2024-03-13 RX ADMIN — HYDROCODONE BITARTRATE AND ACETAMINOPHEN 2 TABLET: 5; 325 TABLET ORAL at 12:05

## 2024-03-13 RX ADMIN — FOLIC ACID 500 MCG: 1 TABLET ORAL at 11:59

## 2024-03-13 RX ADMIN — DOCUSATE SODIUM 100 MG: 100 CAPSULE, LIQUID FILLED ORAL at 20:43

## 2024-03-13 RX ADMIN — TRAZODONE HYDROCHLORIDE 50 MG: 50 TABLET ORAL at 20:43

## 2024-03-13 RX ADMIN — SODIUM CHLORIDE, PRESERVATIVE FREE 10 ML: 5 INJECTION INTRAVENOUS at 12:01

## 2024-03-13 RX ADMIN — BUPROPION HYDROCHLORIDE 300 MG: 300 TABLET, FILM COATED, EXTENDED RELEASE ORAL at 12:00

## 2024-03-13 RX ADMIN — CLOPIDOGREL BISULFATE 75 MG: 75 TABLET ORAL at 12:06

## 2024-03-13 RX ADMIN — SODIUM CHLORIDE, PRESERVATIVE FREE 10 ML: 5 INJECTION INTRAVENOUS at 20:55

## 2024-03-13 RX ADMIN — HEPARIN SODIUM 5000 UNITS: 5000 INJECTION INTRAVENOUS; SUBCUTANEOUS at 11:57

## 2024-03-13 RX ADMIN — HEPARIN SODIUM 5000 UNITS: 5000 INJECTION INTRAVENOUS; SUBCUTANEOUS at 20:43

## 2024-03-13 RX ADMIN — DOCUSATE SODIUM 100 MG: 100 CAPSULE, LIQUID FILLED ORAL at 11:57

## 2024-03-13 RX ADMIN — HYDROCODONE BITARTRATE AND ACETAMINOPHEN 2 TABLET: 5; 325 TABLET ORAL at 05:43

## 2024-03-13 RX ADMIN — ATORVASTATIN CALCIUM 10 MG: 10 TABLET, FILM COATED ORAL at 12:02

## 2024-03-13 RX ADMIN — MAGNESIUM HYDROXIDE 15 ML: 1200 LIQUID ORAL at 20:43

## 2024-03-13 RX ADMIN — AMLODIPINE BESYLATE 5 MG: 5 TABLET ORAL at 11:59

## 2024-03-13 RX ADMIN — SEVELAMER CARBONATE 800 MG: 800 TABLET, FILM COATED ORAL at 11:58

## 2024-03-13 ASSESSMENT — PAIN SCALES - GENERAL
PAINLEVEL_OUTOF10: 10
PAINLEVEL_OUTOF10: 4
PAINLEVEL_OUTOF10: 10

## 2024-03-13 ASSESSMENT — PAIN DESCRIPTION - PAIN TYPE: TYPE: ACUTE PAIN;SURGICAL PAIN

## 2024-03-13 ASSESSMENT — PAIN DESCRIPTION - LOCATION
LOCATION: HIP

## 2024-03-13 ASSESSMENT — PAIN DESCRIPTION - FREQUENCY: FREQUENCY: INTERMITTENT

## 2024-03-13 ASSESSMENT — PAIN - FUNCTIONAL ASSESSMENT
PAIN_FUNCTIONAL_ASSESSMENT: PREVENTS OR INTERFERES WITH ALL ACTIVE AND SOME PASSIVE ACTIVITIES

## 2024-03-13 ASSESSMENT — PAIN DESCRIPTION - DESCRIPTORS
DESCRIPTORS: ACHING;DISCOMFORT

## 2024-03-13 ASSESSMENT — PAIN DESCRIPTION - ORIENTATION
ORIENTATION: LEFT

## 2024-03-13 ASSESSMENT — PAIN DESCRIPTION - ONSET: ONSET: ON-GOING

## 2024-03-13 NOTE — PROGRESS NOTES
[facility]  STR ORTHOPEDICS 7K  Occupational Therapy  Daily Note  Time:   Time In: 1322  Time Out: 1353  Timed Code Treatment Minutes: 31 Minutes  Minutes: 31          Date: 3/13/2024  Patient Name: Deloris Watts,   Gender: female      Room: [facility]07/007-A  MRN: [id number]  : 1955  (68 y.o.)  Referring Practitioner: Mikayla Doll PA-C  Diagnosis: ESRD on hemodialysis  Additional Pertinent Hx: Per H&P, \"The patient is a 68 y.o. female with a past medical history of ESRD on hemodialysis every MWF, CHF, CVA, hypertension, and hyperlipidemia who presented to [facility] ED following a fall.  Patient is a walker assisted ambulator at baseline. She endorsed last evening she was standing in her bathroom when she lost her balance and fell backwards and landed on her left hip.  She did endorse bumping her head but denied any loss of consciousness.  She is on aspirin and Plavix.  No anticoagulation reported.  Patient endorses following the fall she had immediate and severe pain in her left hip.  She was unable to ambulate. She was found to have femoral neck fracture and orthopedic surgery was consulted.  Upon assessment this morning patient stated she had significant pain in her left hip.  She denied any other acute pain or complaints.  She denies any headaches, lightheadedness, dizziness, neck pain, back pain, chest pain, shortness of breath, abdominal pain, nausea/vomiting, other pain in extremities, and paresthesias.  Plain films of the left hip reviewed a acute displaced fracture of the left femoral neck.  CT head and cervical spine negative for any acute traumatic injuries.  Chest x-ray unremarkable.  Labs and vital signs reviewed.  Patient afebrile, vital signs stable.  No leukocytosis.  Hemoglobin 12.8.  Creatinine 6.2.  None since surgery troponin elevated at 53 but has previously been 54 and 58 on 2024.  Patient's admitted under orthopedic surgery with consults placed to hospitalist  training, Patient/Caregiver education & training, Equipment evaluation, education, & procurement    Education:  Learners: Patient and Family  Precautions    Goals  Short Term Goals  Time Frame for Short Term Goals: Until discharge  Short Term Goal 1: Pt will complete LB dressing with LHAE and min A to increase occuypational preformance in home environment while maintaining hip precautions.  Short Term Goal 2: Pt will complete functional standing task x 3 minutes with 1 UE release and CGA to increase indep and endurance with all sinkside grooming and clothing management.  Short Term Goal 3: Pt will navigate to/from BR with CGA and min vcs for safety to increase indep and endurance with all toileting.  Short Term Goal 4: Pt will preform full body bathing with min A and AE PRN to increase indep in home environment.  Short Term Goal 5: Pt will complete BUE strengthening exercise HEP with min vcs for technique x 10 reps to increase indep with self cares.  Additional Goals?: No    Following session, patient left in safe position with all fall risk precautions in place.

## 2024-03-13 NOTE — PROGRESS NOTES
[facility]  Acute Inpatient Rehab Preadmission Assessment    Patient Name: Deloris Watts        Ethnicity:Not of , /a, or Ukrainian origin  Race:White  MRN: [id number]    : 1955  (68 y.o.)  Gender: female     Admitted from:[facility]  Initial Assessment    Date of admission to the hospital: 3/10/2024 11:37 PM  Date patient eligible for admission:3/14/2024    Primary Diagnosis: Left femur neck fracture      Did patient have surgery?  yes - Left hip hemiarthroplasty Dr. Lezama 3/10/2024    Physicians: Neymar Morton MD, Dr. Lezama, Dr. Cuadra, Dr. Whatley, Dr. Mosquera    Risk for clinical complications/co-morbidities:   Past Medical History:   Diagnosis Date    Allergic rhinitis     Anemia in CKD (chronic kidney disease)     Anxiety     CHF (congestive heart failure) (HCC)     Closed fracture of right proximal humerus 2021    ORIF    Closed right ankle fracture     In ; treated with casting    CVA (cerebral infarction)     in  ; with left-sided weakness    Depression     Fibromyalgia     in     Headache(784.0)     Hemiplegia and hemiparesis following cerebral infarction affecting left non-dominant side (HCC)     in     Hydronephrosis 2023    Urogenital Implants, calculus of ureter, UTI    Hyperlipidemia     Hypertension     in     Irritable bowel syndrome     in     Kidney failure     Chronic Kidney Disease, Renal Dialysis started in 2023    Osteoporosis 2019    Unspecified cerebral artery occlusion with cerebral infarction     Unspecified sleep apnea     Wrist fracture, right 2018    Treated with casting       Financial Information  Primary insurance: Medicare    Secondary Insurance:   ANTH MEDICARE    Has the patient had two or more falls in the past year or any fall with injury in the past year?   yes    Did the patient have major surgery during the 100 days prior to admission?  yes    Precautions:  supervision to safely return to home setting with family.       I have reviewed and concur with the findings and results of the pre-admission screening assessment completed by the Inpatient Rehabilitation Admissions Coordinator.    ANTON DAVIS MD

## 2024-03-13 NOTE — PROGRESS NOTES
[facility]  INPATIENT PHYSICAL THERAPY  DAILY NOTE  [facility] ORTHOPEDICS 7K - 7K-07/007-A    Time In: 1132  Time Out: 1200  Timed Code Treatment Minutes: 28 Minutes  Minutes: 28        Date: 3/13/2024  Patient Name: Deloris Watts,  Gender:  female        MRN: [id number]  : 1955  (68 y.o.)     Referring Practitioner: Mikayla Doll PA-C  Diagnosis: ESRD on hemodialysis  Additional Pertinent Hx: Per EMR:The patient is a 68 y.o. female with a past medical history of ESRD on hemodialysis every MWF, CHF, CVA, hypertension, and hyperlipidemia who presented to [facility] ED following a fall.  Patient is a walker assisted ambulator at baseline. She endorsed last evening she was standing in her bathroom when she lost her balance and fell backwards and landed on her left hip.  She did endorse bumping her head but denied any loss of consciousness.  She is on aspirin and Plavix.  No anticoagulation reported.  Patient endorses following the fall she had immediate and severe pain in her left hip.  She was unable to ambulate. She was found to have femoral neck fracture and orthopedic surgery was consulted.  Upon assessment this morning patient stated she had significant pain in her left hip.  She denied any other acute pain or complaints.  She denies any headaches, lightheadedness, dizziness, neck pain, back pain, chest pain, shortness of breath, abdominal pain, nausea/vomiting, other pain in extremities, and paresthesias.  Plain films of the left hip reviewed a acute displaced fracture of the left femoral neck.  CT head and cervical spine negative for any acute traumatic injuries. Patient Left Misael Hip Arthroplasty on 3/11/24.     Prior Level of Function:  Lives With: Spouse  Type of Home: House  Home Layout: One level  Home Access: Stairs to enter without rails  Entrance Stairs - Number of Steps: 1 MARIANA with left sided grab bar on the wall  Home Equipment: Walker, rolling, Walker, standard,  Term Goals  Time Frame for Short Term Goals: By discharge  Short Term Goal 1: Supine to/from sit at Min A in order to get in/out of bed.  Short Term Goal 2: Sit to/from stand at CGA in order to get up to walk.  Short Term Goal 3: Ambulate 25 feet with RW at CGA in order to walk to/from bathroom.  Long Term Goals  Time Frame for Long Term Goals : NA due to short ELOS    Following session, patient left in safe position with all fall risk precautions in place.

## 2024-03-13 NOTE — PROGRESS NOTES
[facility]  INPATIENT REHABILITATION  ADMISSIONS COORDINATOR CONSULT    Referral Type: internal    Patient Name: Deloris Watts      MRN: [id number]    : 1955  (68 y.o.)  Gender: female   Race:White (non-)     Payor Source: Payor: MEDICARE / Plan: MEDICARE PART A AND B / Product Type: *No Product type* /   Secondary Payor Source:  BCBS    Isolation Status: No active isolations    Lives With: Spouse  Type of Home: House  Home Layout: One level  Home Access: Stairs to enter without rails  Entrance Stairs - Number of Steps: 1 MARIANA with left sided grab bar on the wall        Additional Comments: Patient reports she walks with a rolling walker PTA. Patient was on IPR in Dec 2023 for 2 weeks after falling and breaking L2 vertebra.  very supportive and able to assist at discharge.  takes patient to dialysis M-W-.    Disciplines Required upon Admission to Inpatient Rehabilitation: Physical Therapy, Occupational Therapy, and Speech Therapy  Post operative: Yes  Fall: Yes  Dialysis: Yes  Diet: ADULT DIET; Regular  ADULT ORAL NUTRITION SUPPLEMENT; Breakfast, Lunch; Standard 4 oz Oral Supplement  Discussed patient with  and PM&R provider: Patient is appropriate for IPR.

## 2024-03-13 NOTE — PROGRESS NOTES
Rounded on unit, spoke with patient briefly who states that she does not feel she can do 3 hours of therapy. Patient states that she wants to go to bed and sleep.  No family present at time of conversation.  Spoke with FLY Ferraro about patient care this day.  Patient will not admit to IPR today.  Discussed the above with Dr. Whatley he is in agreement with holding off admission to IPR at this time.

## 2024-03-13 NOTE — CARE COORDINATION
3/13/24, 10:02 AM EDT    Patient goals/plan/ treatment preferences discussed by  and .  Patient goals/plan/ treatment preferences reviewed with patient/ family.  Patient/ family verbalize understanding of discharge plan and are in agreement with goal/plan/treatment preferences.  Understanding was demonstrated using the teach back method.  AVS provided by RN at time of discharge, which includes all necessary medical information pertaining to the patients current course of illness, treatment, post-discharge goals of care, and treatment preferences. Plan d/c to IPR, 8K 18 today.     Services At/After Discharge: Inpatient rehab       IMM Letter  IMM Letter given to Patient/Family/Significant other/Guardian/POA/by:: Pt Access  IMM Letter date given:: 03/11/24  IMM Letter time given:: 0117

## 2024-03-13 NOTE — FLOWSHEET NOTE
03/13/24 1033   Vital Signs   BP (!) 142/68   Temp 97.8 °F (36.6 °C)   Pulse (!) 108   Respirations 14   Weight - Scale 42.7 kg (94 lb 2.2 oz)   Weight Method Bed scale   Percent Weight Change -2.29   Post-Hemodialysis Assessment   Post-Treatment Procedures Blood returned;Catheter Capped, clamped with Saline x2 ports   Machine Disinfection Process Acid/Vinegar Clean;Heat Disinfect;Exterior Machine Disinfection   Rinseback Volume (ml) 300 ml   Blood Volume Processed (Liters) 49.6 L   Dialyzer Clearance Lightly streaked   Duration of Treatment (minutes) 90 minutes   Hemodialysis Output (ml) 700 ml   Tolerated Treatment Good     completed 2.5 HR HD TX and removed 700 mls of fluid. pt tolerated HD TX well. CVC dressing is clean, dry and intact. Dialysis catheter flushed with 0.9% NS, clamped with tego's attached. report given to primary nurse, record completed and printed to be scanned into pt EMR.

## 2024-03-13 NOTE — PROGRESS NOTES
MG tablet TAKE 1 TABLET BY MOUTH DAILY 6/1/23   Johana Weldon MD   sevelamer (RENVELA) 800 MG tablet Take 1 tablet by mouth with breakfast and with evening meal 5/1/23   Shey New MD   linaclotide (LINZESS) 145 MCG capsule Take 2 capsules by mouth daily as needed (constipation) PRN    Shey New MD   Omega-3 Fatty Acids (FISH OIL) 1360 MG CAPS Take 1,400 mg by mouth daily    Shey New MD   docusate sodium (COLACE) 100 MG capsule Take 1 capsule by mouth daily    Shey New MD   glucose monitoring (FREESTYLE FREEDOM) kit 1 kit by Does not apply route daily 3/9/23   Johana Weldon MD   glucose monitoring (FREESTYLE FREEDOM) kit 1 kit by Does not apply route daily 3/9/23   Johana Weldon MD   blood glucose monitor strips Test 1-2x/day. 3/9/23   Johana Weldon MD   Lancets MISC 1 each by Does not apply route 2 times daily 3/9/23   Johana Weldon MD   ondansetron (ZOFRAN-ODT) 4 MG disintegrating tablet Take 1 tablet by mouth 3 times daily as needed for Nausea or Vomiting 2/28/23   Johana Weldon MD   cetirizine (ZYRTEC) 5 MG tablet Take 1 tablet by mouth nightly as needed for Allergies  Patient taking differently: Take 2 tablets by mouth nightly as needed for Allergies 12/30/22   Bryn Potts DO   folic acid (FOLVITE) 400 MCG tablet Take 1 tablet by mouth daily    Shey New MD   aspirin 81 MG tablet Take 1 tablet by mouth daily    Shey New MD       Review of Systems:  Constitutional: no fever or chills  Head: No headaches  Eyes: no blurry vision, no discharge  Ears: no ear pain or hearing changes  Nose: no runny nose or epistaxis  Respiratory: no shortness of breath or cough or sputum production  Cardiovascular: no chest pain, no edema  GI: no nausea, no vomiting or diarrhea  : denies any hematuria, no flank pain  Skin: no rash  Musculoskeletal: no joint pain, moves all ext  Neuro: no tremor, no slurred  speech  Psychiatric: stable mood, no depression or insomnia    Current Meds:  Infusion:    sodium chloride      dextrose       Meds:    docusate sodium  100 mg Oral BID    sodium chloride flush  5-40 mL IntraVENous 2 times per day    amLODIPine  5 mg Oral Daily    buPROPion  300 mg Oral Daily    folic acid  500 mcg Oral Daily    montelukast  10 mg Oral Daily    sevelamer  800 mg Oral Lunch    atorvastatin  10 mg Oral Daily    traZODone  50 mg Oral Nightly    clopidogrel  75 mg Oral Daily    heparin (porcine)  5,000 Units SubCUTAneous BID     Meds prn: sodium chloride flush, sodium chloride, ondansetron **OR** ondansetron, polyethylene glycol, HYDROcodone 5 mg - acetaminophen **OR** HYDROcodone 5 mg - acetaminophen, linaclotide, glucose, dextrose bolus **OR** dextrose bolus, glucagon (rDNA), dextrose     Allergies/Intolerances:  ALLERGIES: Pioglitazone, Amoxicillin, Amoxicillin-pot clavulanate, Other, Ciprofloxacin, and Sulfa antibiotics    24HR INTAKE/OUTPUT:    Intake/Output Summary (Last 24 hours) at 3/13/2024 0823  Last data filed at 3/13/2024 0508  Gross per 24 hour   Intake 690 ml   Output 0 ml   Net 690 ml       I/O last 3 completed shifts:  In: 930 [P.O.:930]  Out: 300 [Urine:300]  No intake/output data recorded.  Admission weight: 45.4 kg (100 lb)  Wt Readings from Last 3 Encounters:   03/12/24 50 kg (110 lb 3.7 oz)   02/22/24 47 kg (103 lb 9.6 oz)   02/20/24 43.9 kg (96 lb 12.8 oz)     Body mass index is 21.53 kg/m².    Physical Examination:  VITALS:  BP (!) 142/63   Pulse 100   Temp 99 °F (37.2 °C) (Oral)   Resp 18   Ht 1.524 m (5')   Wt 50 kg (110 lb 3.7 oz)   SpO2 93%   BMI 21.53 kg/m²   Weight:   Wt Readings from Last 3 Encounters:   03/12/24 50 kg (110 lb 3.7 oz)   02/22/24 47 kg (103 lb 9.6 oz)   02/20/24 43.9 kg (96 lb 12.8 oz)     Constitutional and General Appearance: alert and cooperative with exam, appears comfortable, chronically ill appearing, tremors noted  Eyes: no icteric sclera, no

## 2024-03-13 NOTE — PLAN OF CARE
Problem: Safety - Adult  Goal: Free from fall injury  Outcome: Progressing  Flowsheets (Taken 3/11/2024 0628)  Free From Fall Injury:   Instruct family/caregiver on patient safety   Based on caregiver fall risk screen, instruct family/caregiver to ask for assistance with transferring infant if caregiver noted to have fall risk factors     Problem: Discharge Planning  Goal: Discharge to home or other facility with appropriate resources  Outcome: Progressing  Flowsheets (Taken 3/12/2024 2128)  Discharge to home or other facility with appropriate resources:   Identify barriers to discharge with patient and caregiver   Arrange for needed discharge resources and transportation as appropriate   Identify discharge learning needs (meds, wound care, etc)     Problem: Pain  Goal: Verbalizes/displays adequate comfort level or baseline comfort level  Outcome: Progressing  Flowsheets (Taken 3/12/2024 2128)  Verbalizes/displays adequate comfort level or baseline comfort level:   Assess pain using appropriate pain scale   Encourage patient to monitor pain and request assistance   Administer analgesics based on type and severity of pain and evaluate response   Implement non-pharmacological measures as appropriate and evaluate response   Consider cultural and social influences on pain and pain management     Problem: ABCDS Injury Assessment  Goal: Absence of physical injury  Outcome: Progressing  Flowsheets (Taken 3/11/2024 0628)  Absence of Physical Injury: Implement safety measures based on patient assessment     Problem: Skin/Tissue Integrity  Goal: Absence of new skin breakdown  Description: 1.  Monitor for areas of redness and/or skin breakdown  2.  Assess vascular access sites hourly  3.  Every 4-6 hours minimum:  Change oxygen saturation probe site  4.  Every 4-6 hours:  If on nasal continuous positive airway pressure, respiratory therapy assess nares and determine need for appliance change or resting period.  Outcome:

## 2024-03-13 NOTE — PROGRESS NOTES
[facility]  SPEECH THERAPY MISSED TREATMENT NOTE  [facility] OR[facility]OP[facility] 7K      Date: 3/13/2024  Patient Name: Deloris Watts        MRN: [id number]    : 1955  (68 y.o.)    REASON FOR MISSED TREATMENT:  Attempted to see patient at 0950 for completion of cognitive evaluation. Upon arrival, RN reports patient is [facility] for dialysis. Will follow up as patient is available/appropriate.     Sandra Nicholas M.A., -SLP [phone]

## 2024-03-13 NOTE — PROGRESS NOTES
Orthopaedic Progress Note      SUBJECTIVE   Ms. Watts is post op day # 2 L hip hemiarthroplasty    Seen in dialysis today  no adverse events overnight  Pain well controlled, advanced diet  Denies any numbness/paresthesia in operative extremity  Good effort with PT OT yesterday  No chest pain, sob, dizziness, nausea         OBJECTIVE      Physical    VITALS:  BP (!) 142/63   Pulse 100   Temp 99 °F (37.2 °C) (Oral)   Resp 18   Ht 1.524 m (5')   Wt 50 kg (110 lb 3.7 oz)   SpO2 93%   BMI 21.53 kg/m²   I/O last 3 completed shifts:  In: 930 [P.O.:930]  Out: 300 [Urine:300]    4/10 pain  Gen: alert and oriented to name  location year, conversationally appropriate with injury and surgery   Head: normorcephalic, atraumatic  Resp: unlabored, room air  Pelvis: stable  LLE: aquacel intact, minimal strikethrough  Abd pillow intact  Sensation to light touch intact  Gastroc TA EHL intact  Distal pulses palpable, warm well perfused  Calf supple nontender to palpation       Data  CBC:   Lab Results   Component Value Date/Time    WBC 8.5 2024 06:12 AM    HGB 10.3 2024 06:12 AM     2024 06:12 AM     BMP:    Lab Results   Component Value Date/Time     2024 06:12 AM    K 4.4 2024 06:12 AM    K 4.3 2024 12:10 AM    CL 98 2024 06:12 AM    CO2 17 2024 06:12 AM    BUN 47 2024 06:12 AM    CREATININE 5.5 2024 06:12 AM    CALCIUM 8.6 2024 06:12 AM    GLUCOSE 112 2024 06:12 AM    GLUCOSE 81 2017 08:50 AM     Uric Acid:  No components found for: \"URIC\"  PT/INR:    Lab Results   Component Value Date/Time    INR 0.77 2024 12:10 AM     Troponin:    Lab Results   Component Value Date/Time    TROPONINI <0.006 2013 08:24 PM     Urine Culture:  No components found for: \"CURINE\"      Current Inpatient Medications    Current Facility-Administered Medications: senna (SENOKOT) tablet 17.2 mg, 2 tablet, Oral, Nightly  bisacodyl (DULCOLAX)

## 2024-03-13 NOTE — DISCHARGE SUMMARY
Physician Discharge Summary     Patient ID:  Deloris Watts  [id number]  68 y.o.  1955    Admit date: 3/10/2024    Discharge date and time: 3/14/24    Admitting Physician: Neymar Morton MD     Discharge Physician: Dr Lezama T    Admission Diagnoses: ESRD on hemodialysis (HCC) [N18.6, Z99.2]  Closed fracture of neck of left femur, initial encounter ([facility]) [S72.002A]  Closed subcapital fracture of femur, left, initial encounter ([facility]) [S72.012A]  Closed displaced fracture of left femoral neck (HCC) [S72.002A]    Discharge Diagnoses: ESRD on hemodialysis (HCC) [N18.6, Z99.2]  Closed fracture of neck of left femur, initial encounter ([facility]) [S72.002A]  Closed subcapital fracture of femur, left, initial encounter ([facility]) [S72.012A]  Closed displaced fracture of left femoral neck (HCC) [S72.002A]    Admission Condition: good    Discharged Condition: good    Indication for Admission: unstable orthopaedic    Hospital Course: Patient is now s/p L hip hemiarthroplasty on 3/11/24 by Dr. Lezama. Patient presented to the ED on L hip raheel after sustaining a femoral neck fracture. Surgical intervention was recommended, patient agreeable to proceed. Hospitalist was consulted for perioperative risk assessment, medication reconciliation, and medical management HTN CKD. Optimized from consulting services. On the day of surgery, patient was identified in the pre-operative holding area and agreeable to proceed with surgery. Written consent was obtained. Please see operative note for further details of this procedure. Patient received ortiz-operative antibiotics. Patient recovered in PACU before transfer to a regular nursing floor. Patient was started on tylenol and norco for pain control and resumed plavix ASA, started heparin for dvt prophylaxis for 3 weeks. Patient advanced diet and there were no adverse events on the floor. On the day of discharge, patient was afebrile with stable vital signs, stable upon physical exam. Patient  AM

## 2024-03-14 ENCOUNTER — HOSPITAL ENCOUNTER (INPATIENT)
Age: 69
LOS: 16 days | Discharge: HOME | End: 2024-03-30
Attending: PHYSICAL MEDICINE & REHABILITATION | Admitting: PHYSICAL MEDICINE & REHABILITATION
Payer: MEDICARE

## 2024-03-14 VITALS
RESPIRATION RATE: 12 BRPM | TEMPERATURE: 97.6 F | DIASTOLIC BLOOD PRESSURE: 58 MMHG | HEIGHT: 60 IN | SYSTOLIC BLOOD PRESSURE: 104 MMHG | BODY MASS INDEX: 18.48 KG/M2 | OXYGEN SATURATION: 97 % | HEART RATE: 90 BPM | WEIGHT: 94.14 LBS

## 2024-03-14 DIAGNOSIS — S72.012A CLOSED SUBCAPITAL FRACTURE OF FEMUR, LEFT, INITIAL ENCOUNTER (HCC): Primary | ICD-10-CM

## 2024-03-14 DIAGNOSIS — S72.002A CLOSED DISPLACED FRACTURE OF LEFT FEMORAL NECK (HCC): ICD-10-CM

## 2024-03-14 LAB
ALBUMIN SERPL BCG-MCNC: 3.2 G/DL (ref 3.5–5.1)
ALP SERPL-CCNC: 147 U/L (ref 38–126)
ALT SERPL W/O P-5'-P-CCNC: 12 U/L (ref 11–66)
ANION GAP SERPL CALC-SCNC: 15 MEQ/L (ref 8–16)
AST SERPL-CCNC: 17 U/L (ref 5–40)
BASOPHILS ABSOLUTE: 0.1 THOU/MM3 (ref 0–0.1)
BASOPHILS NFR BLD AUTO: 0.7 %
BILIRUB SERPL-MCNC: 0.3 MG/DL (ref 0.3–1.2)
BUN SERPL-MCNC: 36 MG/DL (ref 7–22)
CALCIUM SERPL-MCNC: 8.9 MG/DL (ref 8.5–10.5)
CHLORIDE SERPL-SCNC: 96 MEQ/L (ref 98–111)
CO2 SERPL-SCNC: 24 MEQ/L (ref 23–33)
CREAT SERPL-MCNC: 4.3 MG/DL (ref 0.4–1.2)
DEPRECATED RDW RBC AUTO: 48.8 FL (ref 35–45)
EOSINOPHIL NFR BLD AUTO: 1.3 %
EOSINOPHILS ABSOLUTE: 0.2 THOU/MM3 (ref 0–0.4)
ERYTHROCYTE [DISTWIDTH] IN BLOOD BY AUTOMATED COUNT: 13.1 % (ref 11.5–14.5)
GFR SERPL CREATININE-BSD FRML MDRD: 11 ML/MIN/1.73M2
GLUCOSE SERPL-MCNC: 85 MG/DL (ref 70–108)
HCT VFR BLD AUTO: 32 % (ref 37–47)
HGB BLD-MCNC: 9.9 GM/DL (ref 12–16)
IMM GRANULOCYTES # BLD AUTO: 0.12 THOU/MM3 (ref 0–0.07)
IMM GRANULOCYTES NFR BLD AUTO: 1 %
LYMPHOCYTES ABSOLUTE: 1.9 THOU/MM3 (ref 1–4.8)
LYMPHOCYTES NFR BLD AUTO: 16.1 %
MCH RBC QN AUTO: 31.6 PG (ref 26–33)
MCHC RBC AUTO-ENTMCNC: 30.9 GM/DL (ref 32.2–35.5)
MCV RBC AUTO: 102.2 FL (ref 81–99)
MONOCYTES ABSOLUTE: 1.2 THOU/MM3 (ref 0.4–1.3)
MONOCYTES NFR BLD AUTO: 9.8 %
NEUTROPHILS NFR BLD AUTO: 71.1 %
NRBC BLD AUTO-RTO: 0 /100 WBC
PLATELET # BLD AUTO: 215 THOU/MM3 (ref 130–400)
PMV BLD AUTO: 11.1 FL (ref 9.4–12.4)
POTASSIUM SERPL-SCNC: 4.5 MEQ/L (ref 3.5–5.2)
PROT SERPL-MCNC: 6.3 G/DL (ref 6.1–8)
RBC # BLD AUTO: 3.13 MILL/MM3 (ref 4.2–5.4)
SEGMENTED NEUTROPHILS ABSOLUTE COUNT: 8.4 THOU/MM3 (ref 1.8–7.7)
SODIUM SERPL-SCNC: 135 MEQ/L (ref 135–145)
WBC # BLD AUTO: 11.8 THOU/MM3 (ref 4.8–10.8)

## 2024-03-14 PROCEDURE — 6370000000 HC RX 637 (ALT 250 FOR IP): Performed by: PHYSICIAN ASSISTANT

## 2024-03-14 PROCEDURE — 97535 SELF CARE MNGMENT TRAINING: CPT

## 2024-03-14 PROCEDURE — 6360000002 HC RX W HCPCS: Performed by: PHYSICIAN ASSISTANT

## 2024-03-14 PROCEDURE — 97530 THERAPEUTIC ACTIVITIES: CPT

## 2024-03-14 PROCEDURE — 2580000003 HC RX 258: Performed by: PHYSICAL MEDICINE & REHABILITATION

## 2024-03-14 PROCEDURE — 80053 COMPREHEN METABOLIC PANEL: CPT

## 2024-03-14 PROCEDURE — 2580000003 HC RX 258: Performed by: PHYSICIAN ASSISTANT

## 2024-03-14 PROCEDURE — 36415 COLL VENOUS BLD VENIPUNCTURE: CPT

## 2024-03-14 PROCEDURE — 92523 SPEECH SOUND LANG COMPREHEN: CPT

## 2024-03-14 PROCEDURE — 99232 SBSQ HOSP IP/OBS MODERATE 35: CPT | Performed by: INTERNAL MEDICINE

## 2024-03-14 PROCEDURE — 97116 GAIT TRAINING THERAPY: CPT

## 2024-03-14 PROCEDURE — 85025 COMPLETE CBC W/AUTO DIFF WBC: CPT

## 2024-03-14 PROCEDURE — 99222 1ST HOSP IP/OBS MODERATE 55: CPT | Performed by: PHYSICAL MEDICINE & REHABILITATION

## 2024-03-14 PROCEDURE — 6370000000 HC RX 637 (ALT 250 FOR IP): Performed by: PHYSICAL MEDICINE & REHABILITATION

## 2024-03-14 PROCEDURE — 1180000000 HC REHAB R&B

## 2024-03-14 PROCEDURE — 6360000002 HC RX W HCPCS: Performed by: PHYSICAL MEDICINE & REHABILITATION

## 2024-03-14 RX ORDER — ONDANSETRON 4 MG/1
4 TABLET, ORALLY DISINTEGRATING ORAL EVERY 8 HOURS PRN
Status: CANCELLED | OUTPATIENT
Start: 2024-03-14

## 2024-03-14 RX ORDER — HYDROCODONE BITARTRATE AND ACETAMINOPHEN 5; 325 MG/1; MG/1
1 TABLET ORAL EVERY 4 HOURS PRN
Status: CANCELLED | OUTPATIENT
Start: 2024-03-14

## 2024-03-14 RX ORDER — TRAZODONE HYDROCHLORIDE 50 MG/1
50 TABLET ORAL NIGHTLY
Status: DISCONTINUED | OUTPATIENT
Start: 2024-03-14 | End: 2024-03-15

## 2024-03-14 RX ORDER — ATORVASTATIN CALCIUM 10 MG/1
10 TABLET, FILM COATED ORAL DAILY
Status: CANCELLED | OUTPATIENT
Start: 2024-03-15

## 2024-03-14 RX ORDER — BISACODYL 10 MG
10 SUPPOSITORY, RECTAL RECTAL DAILY PRN
Status: CANCELLED | OUTPATIENT
Start: 2024-03-14

## 2024-03-14 RX ORDER — POLYETHYLENE GLYCOL 3350 17 G/17G
17 POWDER, FOR SOLUTION ORAL DAILY PRN
Status: DISCONTINUED | OUTPATIENT
Start: 2024-03-14 | End: 2024-03-14 | Stop reason: SDUPTHER

## 2024-03-14 RX ORDER — SODIUM CHLORIDE 9 MG/ML
INJECTION, SOLUTION INTRAVENOUS PRN
Status: DISCONTINUED | OUTPATIENT
Start: 2024-03-14 | End: 2024-03-30 | Stop reason: HOSPADM

## 2024-03-14 RX ORDER — FOLIC ACID 1 MG/1
500 TABLET ORAL DAILY
Status: CANCELLED | OUTPATIENT
Start: 2024-03-15

## 2024-03-14 RX ORDER — LANOLIN ALCOHOL/MO/W.PET/CERES
6 CREAM (GRAM) TOPICAL NIGHTLY
Status: DISCONTINUED | OUTPATIENT
Start: 2024-03-14 | End: 2024-03-30 | Stop reason: HOSPADM

## 2024-03-14 RX ORDER — ACETAMINOPHEN 325 MG/1
650 TABLET ORAL EVERY 4 HOURS PRN
Status: DISCONTINUED | OUTPATIENT
Start: 2024-03-14 | End: 2024-03-30 | Stop reason: HOSPADM

## 2024-03-14 RX ORDER — HYDROCODONE BITARTRATE AND ACETAMINOPHEN 5; 325 MG/1; MG/1
2 TABLET ORAL EVERY 4 HOURS PRN
Status: DISCONTINUED | OUTPATIENT
Start: 2024-03-14 | End: 2024-03-30 | Stop reason: HOSPADM

## 2024-03-14 RX ORDER — HEPARIN SODIUM 5000 [USP'U]/ML
5000 INJECTION, SOLUTION INTRAVENOUS; SUBCUTANEOUS 2 TIMES DAILY
Status: CANCELLED | OUTPATIENT
Start: 2024-03-14

## 2024-03-14 RX ORDER — BUPROPION HYDROCHLORIDE 300 MG/1
300 TABLET ORAL DAILY
Status: DISCONTINUED | OUTPATIENT
Start: 2024-03-15 | End: 2024-03-30 | Stop reason: HOSPADM

## 2024-03-14 RX ORDER — BISACODYL 10 MG
10 SUPPOSITORY, RECTAL RECTAL DAILY PRN
Status: DISCONTINUED | OUTPATIENT
Start: 2024-03-14 | End: 2024-03-24

## 2024-03-14 RX ORDER — POLYETHYLENE GLYCOL 3350 17 G/17G
17 POWDER, FOR SOLUTION ORAL DAILY PRN
Status: CANCELLED | OUTPATIENT
Start: 2024-03-14

## 2024-03-14 RX ORDER — SODIUM CHLORIDE 9 MG/ML
INJECTION, SOLUTION INTRAVENOUS PRN
Status: CANCELLED | OUTPATIENT
Start: 2024-03-14

## 2024-03-14 RX ORDER — POLYETHYLENE GLYCOL 3350 17 G/17G
17 POWDER, FOR SOLUTION ORAL DAILY PRN
Status: DISCONTINUED | OUTPATIENT
Start: 2024-03-14 | End: 2024-03-30 | Stop reason: HOSPADM

## 2024-03-14 RX ORDER — FOLIC ACID 1 MG/1
500 TABLET ORAL DAILY
Status: DISCONTINUED | OUTPATIENT
Start: 2024-03-15 | End: 2024-03-30 | Stop reason: HOSPADM

## 2024-03-14 RX ORDER — DOCUSATE SODIUM 100 MG/1
100 CAPSULE, LIQUID FILLED ORAL 2 TIMES DAILY
Status: CANCELLED | OUTPATIENT
Start: 2024-03-14

## 2024-03-14 RX ORDER — SODIUM CHLORIDE 0.9 % (FLUSH) 0.9 %
5-40 SYRINGE (ML) INJECTION PRN
Status: DISCONTINUED | OUTPATIENT
Start: 2024-03-14 | End: 2024-03-30 | Stop reason: HOSPADM

## 2024-03-14 RX ORDER — DEXTROSE MONOHYDRATE 100 MG/ML
INJECTION, SOLUTION INTRAVENOUS CONTINUOUS PRN
Status: CANCELLED | OUTPATIENT
Start: 2024-03-14

## 2024-03-14 RX ORDER — LANOLIN ALCOHOL/MO/W.PET/CERES
6 CREAM (GRAM) TOPICAL NIGHTLY
Status: CANCELLED | OUTPATIENT
Start: 2024-03-14

## 2024-03-14 RX ORDER — SENNOSIDES A AND B 8.6 MG/1
2 TABLET, FILM COATED ORAL NIGHTLY
Status: DISCONTINUED | OUTPATIENT
Start: 2024-03-14 | End: 2024-03-30 | Stop reason: HOSPADM

## 2024-03-14 RX ORDER — MONTELUKAST SODIUM 10 MG/1
10 TABLET ORAL DAILY
Status: DISCONTINUED | OUTPATIENT
Start: 2024-03-15 | End: 2024-03-30 | Stop reason: HOSPADM

## 2024-03-14 RX ORDER — HYDROCODONE BITARTRATE AND ACETAMINOPHEN 5; 325 MG/1; MG/1
1 TABLET ORAL EVERY 4 HOURS PRN
Status: DISCONTINUED | OUTPATIENT
Start: 2024-03-14 | End: 2024-03-30 | Stop reason: HOSPADM

## 2024-03-14 RX ORDER — AMLODIPINE BESYLATE 5 MG/1
5 TABLET ORAL DAILY
Status: DISCONTINUED | OUTPATIENT
Start: 2024-03-15 | End: 2024-03-30 | Stop reason: HOSPADM

## 2024-03-14 RX ORDER — HYDROCODONE BITARTRATE AND ACETAMINOPHEN 5; 325 MG/1; MG/1
2 TABLET ORAL EVERY 4 HOURS PRN
Status: CANCELLED | OUTPATIENT
Start: 2024-03-14

## 2024-03-14 RX ORDER — ATORVASTATIN CALCIUM 10 MG/1
10 TABLET, FILM COATED ORAL DAILY
Status: DISCONTINUED | OUTPATIENT
Start: 2024-03-15 | End: 2024-03-30 | Stop reason: HOSPADM

## 2024-03-14 RX ORDER — SODIUM CHLORIDE 0.9 % (FLUSH) 0.9 %
5-40 SYRINGE (ML) INJECTION EVERY 12 HOURS SCHEDULED
Status: CANCELLED | OUTPATIENT
Start: 2024-03-14

## 2024-03-14 RX ORDER — CLOPIDOGREL BISULFATE 75 MG/1
75 TABLET ORAL DAILY
Status: DISCONTINUED | OUTPATIENT
Start: 2024-03-15 | End: 2024-03-30 | Stop reason: HOSPADM

## 2024-03-14 RX ORDER — SEVELAMER CARBONATE 800 MG/1
800 TABLET, FILM COATED ORAL
Status: DISCONTINUED | OUTPATIENT
Start: 2024-03-15 | End: 2024-03-30 | Stop reason: HOSPADM

## 2024-03-14 RX ORDER — CLOPIDOGREL BISULFATE 75 MG/1
75 TABLET ORAL DAILY
Status: CANCELLED | OUTPATIENT
Start: 2024-03-15

## 2024-03-14 RX ORDER — IBUPROFEN 600 MG/1
1 TABLET ORAL PRN
Status: CANCELLED | OUTPATIENT
Start: 2024-03-14

## 2024-03-14 RX ORDER — MONTELUKAST SODIUM 10 MG/1
10 TABLET ORAL DAILY
Status: CANCELLED | OUTPATIENT
Start: 2024-03-15

## 2024-03-14 RX ORDER — DEXTROSE MONOHYDRATE 100 MG/ML
INJECTION, SOLUTION INTRAVENOUS CONTINUOUS PRN
Status: DISCONTINUED | OUTPATIENT
Start: 2024-03-14 | End: 2024-03-30 | Stop reason: HOSPADM

## 2024-03-14 RX ORDER — AMLODIPINE BESYLATE 5 MG/1
5 TABLET ORAL DAILY
Status: CANCELLED | OUTPATIENT
Start: 2024-03-15

## 2024-03-14 RX ORDER — DOCUSATE SODIUM 100 MG/1
100 CAPSULE, LIQUID FILLED ORAL 2 TIMES DAILY
Status: DISCONTINUED | OUTPATIENT
Start: 2024-03-14 | End: 2024-03-14

## 2024-03-14 RX ORDER — SODIUM CHLORIDE 0.9 % (FLUSH) 0.9 %
5-40 SYRINGE (ML) INJECTION EVERY 12 HOURS SCHEDULED
Status: DISCONTINUED | OUTPATIENT
Start: 2024-03-14 | End: 2024-03-16

## 2024-03-14 RX ORDER — HEPARIN SODIUM 5000 [USP'U]/ML
5000 INJECTION, SOLUTION INTRAVENOUS; SUBCUTANEOUS 2 TIMES DAILY
Status: DISCONTINUED | OUTPATIENT
Start: 2024-03-14 | End: 2024-03-30 | Stop reason: HOSPADM

## 2024-03-14 RX ORDER — TRAZODONE HYDROCHLORIDE 50 MG/1
50 TABLET ORAL NIGHTLY
Status: CANCELLED | OUTPATIENT
Start: 2024-03-14

## 2024-03-14 RX ORDER — SENNOSIDES A AND B 8.6 MG/1
2 TABLET, FILM COATED ORAL NIGHTLY
Status: CANCELLED | OUTPATIENT
Start: 2024-03-14

## 2024-03-14 RX ORDER — ONDANSETRON 4 MG/1
4 TABLET, ORALLY DISINTEGRATING ORAL EVERY 8 HOURS PRN
Status: DISCONTINUED | OUTPATIENT
Start: 2024-03-14 | End: 2024-03-30 | Stop reason: HOSPADM

## 2024-03-14 RX ORDER — DOCUSATE SODIUM 100 MG/1
100 CAPSULE, LIQUID FILLED ORAL 2 TIMES DAILY
Status: DISCONTINUED | OUTPATIENT
Start: 2024-03-14 | End: 2024-03-24

## 2024-03-14 RX ORDER — SODIUM CHLORIDE 0.9 % (FLUSH) 0.9 %
5-40 SYRINGE (ML) INJECTION PRN
Status: CANCELLED | OUTPATIENT
Start: 2024-03-14

## 2024-03-14 RX ORDER — GLUCAGON 1 MG/ML
1 KIT INJECTION PRN
Status: DISCONTINUED | OUTPATIENT
Start: 2024-03-14 | End: 2024-03-30 | Stop reason: HOSPADM

## 2024-03-14 RX ORDER — BUPROPION HYDROCHLORIDE 300 MG/1
300 TABLET ORAL DAILY
Status: CANCELLED | OUTPATIENT
Start: 2024-03-15

## 2024-03-14 RX ORDER — SEVELAMER CARBONATE 800 MG/1
800 TABLET, FILM COATED ORAL
Status: CANCELLED | OUTPATIENT
Start: 2024-03-14

## 2024-03-14 RX ADMIN — TRAZODONE HYDROCHLORIDE 50 MG: 50 TABLET ORAL at 20:48

## 2024-03-14 RX ADMIN — HEPARIN SODIUM 5000 UNITS: 5000 INJECTION INTRAVENOUS; SUBCUTANEOUS at 10:47

## 2024-03-14 RX ADMIN — SODIUM CHLORIDE, PRESERVATIVE FREE 10 ML: 5 INJECTION INTRAVENOUS at 10:58

## 2024-03-14 RX ADMIN — SEVELAMER CARBONATE 800 MG: 800 TABLET, FILM COATED ORAL at 12:12

## 2024-03-14 RX ADMIN — NALOXEGOL OXALATE 12.5 MG: 12.5 TABLET, FILM COATED ORAL at 05:16

## 2024-03-14 RX ADMIN — DOCUSATE SODIUM 100 MG: 100 CAPSULE, LIQUID FILLED ORAL at 10:49

## 2024-03-14 RX ADMIN — SODIUM CHLORIDE, PRESERVATIVE FREE 10 ML: 5 INJECTION INTRAVENOUS at 20:48

## 2024-03-14 RX ADMIN — DOCUSATE SODIUM 100 MG: 100 CAPSULE, LIQUID FILLED ORAL at 20:49

## 2024-03-14 RX ADMIN — BUPROPION HYDROCHLORIDE 300 MG: 300 TABLET, FILM COATED, EXTENDED RELEASE ORAL at 10:49

## 2024-03-14 RX ADMIN — HYDROCODONE BITARTRATE AND ACETAMINOPHEN 2 TABLET: 5; 325 TABLET ORAL at 10:49

## 2024-03-14 RX ADMIN — DOCUSATE SODIUM 283 MG: 283 LIQUID RECTAL at 15:18

## 2024-03-14 RX ADMIN — HYDROCODONE BITARTRATE AND ACETAMINOPHEN 2 TABLET: 5; 325 TABLET ORAL at 15:26

## 2024-03-14 RX ADMIN — CLOPIDOGREL BISULFATE 75 MG: 75 TABLET ORAL at 10:49

## 2024-03-14 RX ADMIN — Medication 6 MG: at 20:48

## 2024-03-14 RX ADMIN — HEPARIN SODIUM 5000 UNITS: 5000 INJECTION INTRAVENOUS; SUBCUTANEOUS at 21:01

## 2024-03-14 RX ADMIN — HYDROCODONE BITARTRATE AND ACETAMINOPHEN 2 TABLET: 5; 325 TABLET ORAL at 00:41

## 2024-03-14 RX ADMIN — ATORVASTATIN CALCIUM 10 MG: 10 TABLET, FILM COATED ORAL at 10:49

## 2024-03-14 RX ADMIN — FOLIC ACID 500 MCG: 1 TABLET ORAL at 10:48

## 2024-03-14 RX ADMIN — SENNOSIDES 17.2 MG: 8.6 TABLET, FILM COATED ORAL at 20:48

## 2024-03-14 RX ADMIN — AMLODIPINE BESYLATE 5 MG: 5 TABLET ORAL at 10:48

## 2024-03-14 RX ADMIN — MONTELUKAST SODIUM 10 MG: 10 TABLET ORAL at 10:49

## 2024-03-14 ASSESSMENT — PAIN DESCRIPTION - DESCRIPTORS
DESCRIPTORS: SHARP
DESCRIPTORS: ACHING
DESCRIPTORS: SORE
DESCRIPTORS: ACHING;DISCOMFORT
DESCRIPTORS: SHARP
DESCRIPTORS: ACHING

## 2024-03-14 ASSESSMENT — PAIN DESCRIPTION - ORIENTATION
ORIENTATION: LEFT

## 2024-03-14 ASSESSMENT — PAIN DESCRIPTION - ONSET: ONSET: ON-GOING

## 2024-03-14 ASSESSMENT — PAIN - FUNCTIONAL ASSESSMENT
PAIN_FUNCTIONAL_ASSESSMENT: PREVENTS OR INTERFERES SOME ACTIVE ACTIVITIES AND ADLS
PAIN_FUNCTIONAL_ASSESSMENT: PREVENTS OR INTERFERES SOME ACTIVE ACTIVITIES AND ADLS
PAIN_FUNCTIONAL_ASSESSMENT: PREVENTS OR INTERFERES WITH ALL ACTIVE AND SOME PASSIVE ACTIVITIES
PAIN_FUNCTIONAL_ASSESSMENT: PREVENTS OR INTERFERES SOME ACTIVE ACTIVITIES AND ADLS

## 2024-03-14 ASSESSMENT — PAIN DESCRIPTION - FREQUENCY
FREQUENCY: INTERMITTENT
FREQUENCY: INTERMITTENT

## 2024-03-14 ASSESSMENT — PAIN SCALES - GENERAL
PAINLEVEL_OUTOF10: 9
PAINLEVEL_OUTOF10: 0
PAINLEVEL_OUTOF10: 4
PAINLEVEL_OUTOF10: 7
PAINLEVEL_OUTOF10: 10
PAINLEVEL_OUTOF10: 5
PAINLEVEL_OUTOF10: 4
PAINLEVEL_OUTOF10: 5
PAINLEVEL_OUTOF10: 3

## 2024-03-14 ASSESSMENT — PAIN DESCRIPTION - LOCATION
LOCATION: HIP

## 2024-03-14 ASSESSMENT — PAIN DESCRIPTION - PAIN TYPE
TYPE: SURGICAL PAIN

## 2024-03-14 NOTE — PROGRESS NOTES
Rounded on unit, spoke with both patent and her  regarding rehab admission.  Discussed need for patient to have bowel movement, provided education on bowel medication.  ISSA Ames updated via telephone call.

## 2024-03-14 NOTE — PROGRESS NOTES
Orthopaedic Progress Note      SUBJECTIVE   Ms. Watts is post op day # 3 L hip hemiarthroplasty    Seen bedside   no adverse events overnight  Pain well controlled, advanced diet  Some nausea   Denies any numbness/paresthesia in operative extremity  Good effort with PT OT yesterday  No chest pain, sob, dizziness, nausea   Improvement since evaluated yesterday after dialysis by IPR   Family bedside       OBJECTIVE      Physical    VITALS:  BP (!) 110/44   Pulse 81   Temp 97.5 °F (36.4 °C) (Oral)   Resp 20   Ht 1.524 m (5')   Wt 42.7 kg (94 lb 2.2 oz)   SpO2 95%   BMI 18.38 kg/m²   I/O last 3 completed shifts:  In: 750 [P.O.:750]  Out: 700     4/10 pain  Gen: alert and oriented to name  location year, conversationally appropriate with injury and surgery   Head: normorcephalic, atraumatic  Resp: unlabored, room air  Pelvis: stable  LLE: aquacel intact, minimal strikethrough  Abd pillow intact  Sensation to light touch intact  Gastroc TA EHL intact  Distal pulses palpable, warm well perfused  Calf supple nontender to palpation       Data  CBC:   Lab Results   Component Value Date/Time    WBC 11.8 2024 06:48 AM    HGB 9.9 2024 06:48 AM     2024 06:48 AM     BMP:    Lab Results   Component Value Date/Time     2024 06:48 AM    K 4.5 2024 06:48 AM    K 4.3 2024 12:10 AM    CL 96 2024 06:48 AM    CO2 24 2024 06:48 AM    BUN 36 2024 06:48 AM    CREATININE 4.3 2024 06:48 AM    CALCIUM 8.9 2024 06:48 AM    GLUCOSE 85 2024 06:48 AM    GLUCOSE 81 2017 08:50 AM     Uric Acid:  No components found for: \"URIC\"  PT/INR:    Lab Results   Component Value Date/Time    INR 0.77 2024 12:10 AM     Troponin:    Lab Results   Component Value Date/Time    TROPONINI <0.006 2013 08:24 PM     Urine Culture:  No components found for: \"CURINE\"      Current Inpatient Medications    Current Facility-Administered Medications: senna

## 2024-03-14 NOTE — PROGRESS NOTES
[facility]  SPEECH THERAPY  [facility] 7K  Speech - Language - Cognitive Evaluation    SLP Individual Minutes  Time In: 1105  Time Out: 1125  Minutes: 20  Timed Code Treatment Minutes: 0 Minutes       Date: 3/14/2024  Patient Name: Deloris Watts      CSN: [id number]   : 1955  (68 y.o.)  Gender: female   Referring Physician:  Jax Whatley MD   Diagnosis: ESRD on hemodialysis   Precautions: fall risk   History of Present Illness/Injury: Patient admitted with above diagnosis. Per chart review, \"The patient is a 68 y.o. female with a past medical history of ESRD on hemodialysis every MWF, CHF, CVA, hypertension, and hyperlipidemia who presented to [facility] ED following a fall.  Patient is a walker assisted ambulator at baseline. She endorsed last evening she was standing in her bathroom when she lost her balance and fell backwards and landed on her left hip.  She did endorse bumping her head but denied any loss of consciousness.  She is on aspirin and Plavix.  No anticoagulation reported.  Patient endorses following the fall she had immediate and severe pain in her left hip.  She was unable to ambulate. She was found to have femoral neck fracture and orthopedic surgery was consulted.  Upon assessment this morning patient stated she had significant pain in her left hip.  She denied any other acute pain or complaints.  She denies any headaches, lightheadedness, dizziness, neck pain, back pain, chest pain, shortness of breath, abdominal pain, nausea/vomiting, other pain in extremities, and paresthesias.  Plain films of the left hip reviewed a acute displaced fracture of the left femoral neck.  CT head and cervical spine negative for any acute traumatic injuries.  Chest x-ray unremarkable.  Labs and vital signs reviewed.  Patient afebrile, vital signs stable.  No leukocytosis.  Hemoglobin 12.8.  Creatinine 6.2.  None since surgery troponin elevated at 53 but has previously been 54 and 58 on  continued skilled ST services to address aformentioned deficits in order to make successful return to PLOF.      Rehabilitation Potential: good  Discharge Recommendations: Inpatient Rehabilitation    EDUCATION:  Learner: Patient  Education:  Reviewed results and recommendations of this evaluation, Reviewed ST goals and Plan of Care, Reviewed recommendations for follow-up, Education Related to Potential Risks and Complications Due to Impairment/Illness/Injury, Education Related to Prevention of Recurrence of Impairment/Illness/Injury, and Education Related to Health Promotion and Wellness  Evaluation of Education: Verbalizes understanding and Needs further instruction    PLAN:  Skilled SLP intervention on acute care 3-5 x per week or until goals met and/or pt plateaus in function.  Specific interventions for next session may include: recall, reasoning, attention .    PATIENT GOAL:    Did not state.  Will further assess during treatment.    SHORT TERM GOALS:  Short Term Goals  Time Frame for Short Term Goals: 2 weeks  Goal 1: Patient will complete functional recall tasks (immediate, delayed, working) with 75% accuracy given mod cues to improve recall of novel information  Goal 2: Patient will complete functional problem solving and reasoning tasks (basic finances, medications, time) with 75% accuracy given mod cues to resume ADL tasks with least amount of supervision/assistance.  Goal 3: Patient will complete thought organization tasks with 60% accuracy given mod cues to improve processing speed and organization during ADL completion.  Goal 4: Patient will complete attention tasks (sustained, selective, alternating, divided) with no more than 5 errors/redirections to safely resume active driving and multi-tasking during ADL completion.    LONG TERM GOALS:  No long term goals recommended d/t estuardo Ballard M.A., CCC-SLP [id number]

## 2024-03-14 NOTE — DISCHARGE INSTR - COC
Continuity of Care Form    Patient Name: Deloris Watts   :  1955  MRN:  [id number]    Admit date:  3/10/2024  Discharge date:  ***    Code Status Order: Full Code   Advance Directives:     Admitting Physician:  Neymar Morton MD  PCP: Johana Weldon MD    Discharging Nurse: ***  Discharging Hospital Unit/Room#: 7K-07/007-A  Discharging Unit Phone Number: ***    Emergency Contact:   Extended Emergency Contact Information  Primary Emergency Contact: Tenzin Watts  Address: [address] [place] [id number] [facility]  Home Phone: [phone]  Mobile Phone: [phone]  Relation: Spouse   needed? No  Secondary Emergency Contact: Yen Johnsony   [facility]  Home Phone: [phone]  Mobile Phone: [phone]  Relation: Brother/Sister   needed? No    Past Surgical History:  Past Surgical History:   Procedure Laterality Date    CARPAL TUNNEL RELEASE Right     in     COLONOSCOPY      [facility]    CT BIOPSY RENAL  2022    CT BIOPSY RENAL 2022 [facility] CT SCAN    CYSTOSCOPY Left 2023    CYSTOSCOPY, LEFT URETERAL STENT PLACEMENT performed by Edilson Whalen MD at [facility] OR    ENDOSCOPY, COLON, DIAGNOSTIC  unsure    HEMORRHOID SURGERY  unsure    HIP SURGERY Left 3/11/2024    Left Misael Hip Arthroplasty performed by Virgilio Lezama DO at [facility] OR    HUMERUS FRACTURE SURGERY Right     ORIF    HYSTERECTOMY (CERVIX STATUS UNKNOWN)      age 38    LASIK      lasik    NECK SURGERY  18  years ago    cervical spine fusion ; in     OVARY REMOVAL Bilateral     age 38    SINUS SURGERY  10 years ago    SPINE SURGERY N/A 2023    KYPHOPLASTY L2 performed by Nimisha Garza MD at [facility] OR    TUBAL LIGATION      URETER SURGERY N/A 2023    CYSTOSCOPY, LEFT  URETEROSCOPY, LASER LITHOTRIPSIE OF STONES performed by Edilson Whalen MD at [facility] OR       Immunization History:   Immunization History   Administered Date(s) Administered

## 2024-03-14 NOTE — PLAN OF CARE
Problem: Safety - Adult  Goal: Free from fall injury  Outcome: Progressing  Flowsheets (Taken 3/11/2024 0628 by Yessica Monroe, RN)  Free From Fall Injury:   Instruct family/caregiver on patient safety   Based on caregiver fall risk screen, instruct family/caregiver to ask for assistance with transferring infant if caregiver noted to have fall risk factors     Problem: Discharge Planning  Goal: Discharge to home or other facility with appropriate resources  Outcome: Progressing  Flowsheets (Taken 3/13/2024 1945 by Aimee Cervantes, RN)  Discharge to home or other facility with appropriate resources: Identify barriers to discharge with patient and caregiver     Problem: Pain  Goal: Verbalizes/displays adequate comfort level or baseline comfort level  Outcome: Progressing  Flowsheets (Taken 3/14/2024 1032)  Verbalizes/displays adequate comfort level or baseline comfort level:   Encourage patient to monitor pain and request assistance   Assess pain using appropriate pain scale   Administer analgesics based on type and severity of pain and evaluate response   Implement non-pharmacological measures as appropriate and evaluate response   Consider cultural and social influences on pain and pain management   Notify Licensed Independent Practitioner if interventions unsuccessful or patient reports new pain     Problem: ABCDS Injury Assessment  Goal: Absence of physical injury  Outcome: Progressing  Flowsheets (Taken 3/11/2024 0628 by Yessica Monroe, RN)  Absence of Physical Injury: Implement safety measures based on patient assessment     Problem: Skin/Tissue Integrity  Goal: Absence of new skin breakdown  Description: 1.  Monitor for areas of redness and/or skin breakdown  2.  Assess vascular access sites hourly  3.  Every 4-6 hours minimum:  Change oxygen saturation probe site  4.  Every 4-6 hours:  If on nasal continuous positive airway pressure, respiratory therapy assess nares and determine need for appliance  change or resting period.  Outcome: Progressing     Problem: Skin/Tissue Integrity - Adult  Goal: Skin integrity remains intact  Outcome: Progressing  Flowsheets (Taken 3/14/2024 1300)  Skin Integrity Remains Intact:   Monitor for areas of redness and/or skin breakdown   Assess vascular access sites hourly     Problem: Musculoskeletal - Adult  Goal: Return mobility to safest level of function  Outcome: Progressing  Flowsheets (Taken 3/13/2024 1945 by Aimee Cervantes, RN)  Return Mobility to Safest Level of Function: Assess patient stability and activity tolerance for standing, transferring and ambulating with or without assistive devices     Problem: Genitourinary - Adult  Goal: Absence of urinary retention  Outcome: Progressing  Flowsheets (Taken 3/12/2024 2128 by Yessica Monroe, RN)  Absence of urinary retention:   Assess patient’s ability to void and empty bladder   Monitor intake/output and perform bladder scan as needed     Problem: Infection - Adult  Goal: Absence of infection during hospitalization  Outcome: Progressing  Flowsheets (Taken 3/13/2024 1945 by Aimee Cervantes, RN)  Absence of infection during hospitalization: Monitor lab/diagnostic results     Problem: Metabolic/Fluid and Electrolytes - Adult  Goal: Glucose maintained within prescribed range  Outcome: Progressing  Flowsheets (Taken 3/12/2024 2128 by Yessica Monroe, RN)  Glucose maintained within prescribed range:   Monitor blood glucose as ordered   Assess for signs and symptoms of hyperglycemia and hypoglycemia   Administer ordered medications to maintain glucose within target range   Assess barriers to adequate nutritional intake and initiate nutrition consult as needed     Problem: Chronic Conditions and Co-morbidities  Goal: Patient's chronic conditions and co-morbidity symptoms are monitored and maintained or improved  Outcome: Progressing  Flowsheets (Taken 3/13/2024 1945 by Aimee Cervantes, RN)  Care Plan - Patient's Chronic

## 2024-03-14 NOTE — PROGRESS NOTES
[facility]  INPATIENT PHYSICAL THERAPY  DAILY NOTE  [facility] 7K - 7K-07/007-A    Time In: 0832  Time Out: 0857  Timed Code Treatment Minutes: 25 Minutes  Minutes: 25          Date: 3/14/2024  Patient Name: Deloris Watts,  Gender:  female        MRN: [id number]  : 1955  (68 y.o.)     Referring Practitioner: Mikayla Doll PA-C  Diagnosis: ESRD on hemodialysis  Additional Pertinent Hx: Per EMR:The patient is a 68 y.o. female with a past medical history of ESRD on hemodialysis every MWF, CHF, CVA, hypertension, and hyperlipidemia who presented to [facility] ED following a fall.  Patient is a walker assisted ambulator at baseline. She endorsed last evening she was standing in her bathroom when she lost her balance and fell backwards and landed on her left hip.  She did endorse bumping her head but denied any loss of consciousness.  She is on aspirin and Plavix.  No anticoagulation reported.  Patient endorses following the fall she had immediate and severe pain in her left hip.  She was unable to ambulate. She was found to have femoral neck fracture and orthopedic surgery was consulted.  Upon assessment this morning patient stated she had significant pain in her left hip.  She denied any other acute pain or complaints.  She denies any headaches, lightheadedness, dizziness, neck pain, back pain, chest pain, shortness of breath, abdominal pain, nausea/vomiting, other pain in extremities, and paresthesias.  Plain films of the left hip reviewed a acute displaced fracture of the left femoral neck.  CT head and cervical spine negative for any acute traumatic injuries. Patient Left Misael Hip Arthroplasty on 3/11/24.     Prior Level of Function:  Lives With: Spouse  Type of Home: House  Home Layout: One level  Home Access: Stairs to enter without rails  Entrance Stairs - Number of Steps: 1 MARIANA with left sided grab bar on the wall  Home Equipment: Walker, rolling, Walker, standard,  Wheelchair-manual, Lift chair (transport chair)   Bathroom Shower/Tub: Tub/Shower unit  Bathroom Toilet: Standard  Bathroom Equipment: Shower chair, Grab bars in shower  Bathroom Accessibility: Not accessible (Walker doesn't fit in BR.)    ADL Assistance: Independent (Pt reports indep with shower task (spouses washes back), Toileting, and dressing.)  Homemaking Assistance: Needs assistance (Spouse completes grocery shopping, cooking, laundry, and cleaning. Patient will assist with cooking if able to sit at table for meal prep.)  Homemaking Responsibilities: No  Ambulation Assistance: Independent  Transfer Assistance: Independent  Active : No  Additional Comments: Patient reports she walks with a rolling walker PTA. Patient was on IPR in Dec 2023 for 2 weeks after falling and breaking L2 vertebra.  very supportive and able to assist at discharge.  takes patient to dialysis M-W-F.    Restrictions/Precautions:  Restrictions/Precautions: Weight Bearing, Fall Risk  Left Lower Extremity Weight Bearing: Weight Bearing As Tolerated  Position Activity Restriction  Hip Precautions: No hip flexion > 90 degrees, No ADduction, No hip internal rotation, Posterior hip precautions  Other position/activity restrictions: M-W-F Dialysis     SUBJECTIVE: RN approved therapy session. Patient supine in bed upon PTA arrival and agreeable to therapy session. Patient A&Ox3/4. Patient spouse present throughout session and supportive to patient progress.    PAIN: Patient stating 0/10 pain at rest 10/10 with activity.    Vitals: Vitals not assessed per clinical judgement, see nursing flowsheet    OBJECTIVE:  Bed Mobility:  Rolling to Left: Moderate Assistance, with head of bed flat, with rail, with increased time for completion, [facility] assist for hand positioning   Supine to Sit: Moderate Assistance, X 1, with head of bed flat, with rail, with verbal cues , with increased time for completion, Patient with very little initiation

## 2024-03-14 NOTE — PROGRESS NOTES
Renal Progress Note  [facility]    Patient :  Deloris Watts; 68 y.o. MRN# [id number]  Location:  7K-07/007-A  Attending:  Neymar Morton MD  Admit Date:  3/10/2024   Hospital Day: 3      Subjective:     Nephrology is following the patient for ESRD on HD. Pt has been on dialysis for 1 year secondary to HTN.      Pt seen and examined at the bedside with  present.  Patient was getting up to work with physical therapy.  Patient states that she felt weak yesterday and did not have a bowel movement yet.  She will be going to [facility] after she has a bowel movement per .  Postop day 3.  Pain well-controlled.  No complaints at this time.    Outpatient Medications:     Medications Prior to Admission: Probiotic Product (PROBIOTIC DAILY PO), Take 1 tablet by mouth daily 1 gummie daily  sodium chloride (OCEAN, BABY AYR) 0.65 % nasal spray, 1 spray by Nasal route as needed for Congestion Saline nasal spray as needed  montelukast (SINGULAIR) 10 MG tablet, Take 1 tablet by mouth daily  clopidogrel (PLAVIX) 75 MG tablet, Take 1 tablet by mouth daily  amLODIPine (NORVASC) 5 MG tablet, Take 1 tablet by mouth daily  traZODone (DESYREL) 50 MG tablet, Take 1.5 tablets by mouth nightly TAKE 1 TABLET NIGHTLY (Patient taking differently: Take 1 tablet by mouth nightly TAKE 1 50mg TABLET NIGHTLY)  [DISCONTINUED] ondansetron (ZOFRAN) 4 MG tablet, Take 1 tablet by mouth every 8 hours as needed for Nausea or Vomiting  buPROPion (WELLBUTRIN XL) 150 MG extended release tablet, Take with 300 mg daily for 450 mg daily. (Patient taking differently: Take 2 tablets by mouth daily Take with 150 + 300 mg for 450 mg daily.)  buPROPion (WELLBUTRIN XL) 300 MG extended release tablet, Take with 150 mg for 450 mg daily. (Patient taking differently: daily Take with 150 mg for 450 mg daily.)  Handicap Placard MISC, by Does not apply route Expires in 5 years. Dx:  bisacodyl (DULCOLAX) 10 MG suppository, Place 1 suppository  rectally daily as needed for Constipation  fluticasone (FLONASE) 50 MCG/ACT nasal spray, 2 sprays by Each Nostril route daily  diclofenac sodium (VOLTAREN) 1 % GEL, Apply 2 g topically 4 times daily as needed for Pain (Patient taking differently: Apply 2 g topically 4 times daily as needed for Pain Indications: uses on back (L2))  [DISCONTINUED] melatonin 3 MG TABS tablet, Take 2 tablets by mouth at bedtime For sleep (Patient not taking: Reported on 2/22/2024)  [DISCONTINUED] famotidine (PEPCID) 10 MG tablet, Take 1 tablet by mouth daily  [DISCONTINUED] cyclobenzaprine (FLEXERIL) 10 MG tablet, Take 1 tablet by mouth 3 times daily as needed for Muscle spasms  [DISCONTINUED] senna (SENOKOT) 8.6 MG TABS tablet, Take 1 tablet by mouth daily  polyethylene glycol (GLYCOLAX) 17 g packet, Take 1 packet by mouth daily as needed for Constipation  calcitRIOL (ROCALTROL) 0.25 MCG capsule, Take 1 capsule by mouth daily (Patient not taking: Reported on 3/11/2024)  simvastatin (ZOCOR) 20 MG tablet, TAKE 1 TABLET BY MOUTH DAILY  sevelamer (RENVELA) 800 MG tablet, Take 1 tablet by mouth with breakfast and with evening meal  linaclotide (LINZESS) 145 MCG capsule, Take 2 capsules by mouth daily as needed (constipation) PRN  Omega-3 Fatty Acids (FISH OIL) 1360 MG CAPS, Take 1,400 mg by mouth daily  docusate sodium (COLACE) 100 MG capsule, Take 1 capsule by mouth daily  glucose monitoring (FREESTYLE FREEDOM) kit, 1 kit by Does not apply route daily  glucose monitoring (FREESTYLE FREEDOM) kit, 1 kit by Does not apply route daily  blood glucose monitor strips, Test 1-2x/day.  Lancets MISC, 1 each by Does not apply route 2 times daily  ondansetron (ZOFRAN-ODT) 4 MG disintegrating tablet, Take 1 tablet by mouth 3 times daily as needed for Nausea or Vomiting  cetirizine (ZYRTEC) 5 MG tablet, Take 1 tablet by mouth nightly as needed for Allergies (Patient taking differently: Take 2 tablets by mouth nightly as needed for Allergies)  folic acid

## 2024-03-14 NOTE — H&P
Physical Medicine & Rehabilitation Admission History and Physical    Impression:  Status post fall on 3/10/2024 in the bathroom resulting  Displaced left femur neck fracture requiring left hip hemiarthroplasty surgery  Head scalp contusion with cerebral concussion without loss of consciousness  Cervical spine sprain and muscular strain  History of stroke with left hemiparesis  End-stage renal disease requiring hemodialysis  History of fall resulting liver laceration and acute L2 compression fracture requiring kyphoplasty  Hypertension  Irritable bowel syndrome  Hyperlipidemia  Osteoporosis  Allergic rhinitis  History of hydronephrosis  History of fibromyalgia  History of depression and anxiety  History of right proximal humerus fracture requiring ORIF  History of right wrist fracture requiring casting  History of right ankle fracture requiring casting  Cognitive impairment     Plan:   Admit to the inpatient rehabilitation unit.  The patient demonstrates good potential to participate in an inpatient rehabilitation program involving at least 3 hours per day, 5 days per week of intensive rehabilitation.  Rehabilitation services will include PT, OT, and SLP/RT in order to improve functional status prior to discharge.  Family education and training will be completed.  Equipment evaluations and recommendations will be completed as appropriate.       Rehabilitation nursing will be involved for bowel, bladder, skin, and pain management.  Nursing will also provide education and training to patient and family.    Prophylaxis:  DVT: Subcutaneous heparin, ACE stocking, intermittent pneumatic compression device.  GI: Colace, Senokot, Enemeez enema, Movantik, Dulcolax suppository as needed, milk of magnesium as needed, GlycoLax as needed, Linzess as needed  Pain: Tylenol as needed, Norco 5/325 1-2 tab as needed, Voltaren gel as needed  Continue Norvasc for hypertension  Continue Lipitor for hyperlipidemia  Continue Wellbutrin  is abnormally low          Latest Reference Range & Units 02/22/24 10:07   Color, UA YELLOW-STRAW  Yellow   Glucose, UA NEGATIVE mg/dl 100 !   Bilirubin, Urine   Negative   Ketones, Urine NEGATIVE  Negative   Urobilinogen, Urine 0.0 - 1.0 eu/dl 0.20   Nitrite, Urine NEGATIVE  Negative   pH, Urine 5.0 - 9.0  8.50   Protein, Urine NEGATIVE mg/dl 100 !   Blood, UA POC NEGATIVE  Trace-intact   Character, Urine CLR-SL.CLOUD  Clear   Leukocyte Clumps, Urine NEGATIVE  Moderate !   !: Data is abnormal        X-ray of left hip & pelvis (3/11/2024) :  FINDINGS:  There is an acute displaced fracture of the neck of the left femur. The remaining osseous and articular structures are intact. There is a rectangular opacity projected over the proximal right hip, likely outside the patient.   IMPRESSION:   Acute displaced fracture of the neck of the left femur.          Chest x-ray (3/11/2024) :  IMPRESSION:   No acute disease.          X-ray of left femur (3/11/2024) :  FINDINGS:  There is an acute displaced fracture of the neck of the left femur. The remaining osseous and articular structures are intact.   IMPRESSION:  Acute displaced fracture of the neck of the left femur.        CT of head without contrast (3/11/2024) :  IMPRESSION:   No acute intracranial abnormality.   Old right frontal lobe infarct.   Atrophy and chronic microvascular ischemia.   Left maxillary sinus retention cyst/polyp.          CT of cervical spine without contrast (3/11/2024) :  FINDINGS:  Please note that CT is limited in comparison to MRI for evaluation of the spinal cord parenchyma, nerve roots and intervertebral discs.  The patient is status post anterior plate and screw fixation at C5-6 and C6-7. The cervical vertebral bodies are normal in height. No acute fracture is seen. There are anterior marginal osteophytes and mild posterior disc osteophyte complexes at C3-4 at C4-5. There is severe intervertebral disc space narrowing at C4-5 and C6-7. There is

## 2024-03-14 NOTE — PROGRESS NOTES
[facility]  STR ORTHOPEDICS 7K  Occupational Therapy  Daily Note  Time:   Time In: 946  Time Out: 1019  Timed Code Treatment Minutes: 33 Minutes  Minutes: 33          Date: 3/14/2024  Patient Name: Deloris Watts,   Gender: female      Room: [facility]07/007-A  MRN: [id number]  : 1955  (68 y.o.)  Referring Practitioner: Mikayla Doll PA-C  Diagnosis: ESRD on hemodialysis  Additional Pertinent Hx: Per H&P, \"The patient is a 68 y.o. female with a past medical history of ESRD on hemodialysis every MWF, CHF, CVA, hypertension, and hyperlipidemia who presented to [facility] ED following a fall.  Patient is a walker assisted ambulator at baseline. She endorsed last evening she was standing in her bathroom when she lost her balance and fell backwards and landed on her left hip.  She did endorse bumping her head but denied any loss of consciousness.  She is on aspirin and Plavix.  No anticoagulation reported.  Patient endorses following the fall she had immediate and severe pain in her left hip.  She was unable to ambulate. She was found to have femoral neck fracture and orthopedic surgery was consulted.  Upon assessment this morning patient stated she had significant pain in her left hip.  She denied any other acute pain or complaints.  She denies any headaches, lightheadedness, dizziness, neck pain, back pain, chest pain, shortness of breath, abdominal pain, nausea/vomiting, other pain in extremities, and paresthesias.  Plain films of the left hip reviewed a acute displaced fracture of the left femoral neck.  CT head and cervical spine negative for any acute traumatic injuries.  Chest x-ray unremarkable.  Labs and vital signs reviewed.  Patient afebrile, vital signs stable.  No leukocytosis.  Hemoglobin 12.8.  Creatinine 6.2.  None since surgery troponin elevated at 53 but has previously been 54 and 58 on 2024.  Patient's admitted under orthopedic surgery with consults placed to hospitalist

## 2024-03-14 NOTE — PLAN OF CARE
Problem: Safety - Adult  Goal: Free from fall injury  Outcome: Progressing     Problem: Discharge Planning  Goal: Discharge to home or other facility with appropriate resources  Outcome: Progressing  Flowsheets (Taken 3/13/2024 1945)  Discharge to home or other facility with appropriate resources: Identify barriers to discharge with patient and caregiver     Problem: Pain  Goal: Verbalizes/displays adequate comfort level or baseline comfort level  Outcome: Progressing     Problem: ABCDS Injury Assessment  Goal: Absence of physical injury  Outcome: Progressing     Problem: Musculoskeletal - Adult  Goal: Return mobility to safest level of function  Outcome: Progressing  Flowsheets (Taken 3/13/2024 1945)  Return Mobility to Safest Level of Function: Assess patient stability and activity tolerance for standing, transferring and ambulating with or without assistive devices     Problem: Genitourinary - Adult  Goal: Absence of urinary retention  Outcome: Progressing  Flowsheets (Taken 3/12/2024 2128 by Yessica Monroe, RN)  Absence of urinary retention:   Assess patient’s ability to void and empty bladder   Monitor intake/output and perform bladder scan as needed     Problem: Infection - Adult  Goal: Absence of infection during hospitalization  Outcome: Progressing  Flowsheets (Taken 3/13/2024 1945)  Absence of infection during hospitalization: Monitor lab/diagnostic results     Problem: Metabolic/Fluid and Electrolytes - Adult  Goal: Glucose maintained within prescribed range  Outcome: Progressing  Flowsheets (Taken 3/12/2024 2128 by Yessica Monroe, RN)  Glucose maintained within prescribed range:   Monitor blood glucose as ordered   Assess for signs and symptoms of hyperglycemia and hypoglycemia   Administer ordered medications to maintain glucose within target range   Assess barriers to adequate nutritional intake and initiate nutrition consult as needed     Problem: Chronic Conditions and Co-morbidities  Goal:

## 2024-03-14 NOTE — PROGRESS NOTES
Admitted to the Inpatient Rehabilitation Unit via bed.  Patient was then oriented to room and unit.  Education provided on the rehabilitation routine: three hours of therapy five days per week.      Explained patients right to have family, representative or physician notified of their admission.  Patient has Requested for physician to be notified.  Patient has Requested for family/representative to be notified.    Admitting medication orders compared with acute stay medications; home medication list reviewed with patient/family.  Medication issues identified No    Transportation:   Has transportation kept you from medical appointments, meetings, work, or from getting things needed for daily living? (Check all that apply)  No.      Health Literacy:   How often do you need to have someone help you when you read instructions, pamphlets, or other written material from your doctor or pharmacy?  0. - Never    Social Isolation:  How often do you feel lonely or isolated from those around you?  0. Never    Patient Mood Interview (PHQ-2 to 9) (from [employer]©)   Say to Patient: \"Over the last 2 weeks, have you been bothered by any of the following problems?\"   If symptom is present, enter yes in column 1 (Symptom Presence)  If yes in column 1, then ask the patient: “About how often have you been bothered by this?” Indicate response in column 2, Symptom Frequency.   Symptom Presence  No    Yes   9.    No response  Symptom Frequency  Never or 1 day  2-6 days (several days)  7-11 days (half or more of the days)  12-14 days (nearly every day)    Symptom Presence Symptom Frequency   Little interest or pleasure in doing things 0. No 0. Never or 1 day   Feeling down, depressed, or hopeless 0. No 0. Never or 1 day   If either A or B above has symptom frequency coded 2 or 3, CONTINUE asking questions below.      If not, END the interview  and right click on next table to delete.               Pain:  Pain Effect on Sleep    Ask

## 2024-03-14 NOTE — CARE COORDINATION
3/14/24, 11:31 AM EDT    Patient goals/plan/ treatment preferences discussed by  and .  Patient goals/plan/ treatment preferences reviewed with patient/ family.  Patient/ family verbalize understanding of discharge plan and are in agreement with goal/plan/treatment preferences.  Understanding was demonstrated using the teach back method.  AVS provided by RN at time of discharge, which includes all necessary medical information pertaining to the patients current course of illness, treatment, post-discharge goals of care, and treatment preferences. Planning IPR today, await BM.    Services At/After Discharge: Inpatient rehab       IMM Letter  IMM Letter given to Patient/Family/Significant other/Guardian/POA/by:: Carolee BILL   IMM Letter date given:: 03/14/24  IMM Letter time given:: 1102

## 2024-03-15 LAB
ALBUMIN SERPL BCG-MCNC: 3 G/DL (ref 3.5–5.1)
ALP SERPL-CCNC: 157 U/L (ref 38–126)
ALT SERPL W/O P-5'-P-CCNC: 11 U/L (ref 11–66)
ANION GAP SERPL CALC-SCNC: 20 MEQ/L (ref 8–16)
AST SERPL-CCNC: 15 U/L (ref 5–40)
BASOPHILS ABSOLUTE: 0.1 THOU/MM3 (ref 0–0.1)
BASOPHILS NFR BLD AUTO: 0.9 %
BILIRUB SERPL-MCNC: 0.2 MG/DL (ref 0.3–1.2)
BUN SERPL-MCNC: 54 MG/DL (ref 7–22)
CALCIUM SERPL-MCNC: 9.2 MG/DL (ref 8.5–10.5)
CHLORIDE SERPL-SCNC: 97 MEQ/L (ref 98–111)
CHOLEST SERPL-MCNC: 145 MG/DL (ref 100–199)
CO2 SERPL-SCNC: 20 MEQ/L (ref 23–33)
CREAT SERPL-MCNC: 5.6 MG/DL (ref 0.4–1.2)
DEPRECATED RDW RBC AUTO: 49.9 FL (ref 35–45)
EOSINOPHIL NFR BLD AUTO: 1.7 %
EOSINOPHILS ABSOLUTE: 0.1 THOU/MM3 (ref 0–0.4)
ERYTHROCYTE [DISTWIDTH] IN BLOOD BY AUTOMATED COUNT: 13.2 % (ref 11.5–14.5)
GFR SERPL CREATININE-BSD FRML MDRD: 8 ML/MIN/1.73M2
GLUCOSE SERPL-MCNC: 89 MG/DL (ref 70–108)
HCT VFR BLD AUTO: 30.1 % (ref 37–47)
HDLC SERPL-MCNC: 83 MG/DL
HGB BLD-MCNC: 9.4 GM/DL (ref 12–16)
IMM GRANULOCYTES # BLD AUTO: 0.16 THOU/MM3 (ref 0–0.07)
IMM GRANULOCYTES NFR BLD AUTO: 1.8 %
LDLC SERPL CALC-MCNC: 45 MG/DL
LYMPHOCYTES ABSOLUTE: 1.1 THOU/MM3 (ref 1–4.8)
LYMPHOCYTES NFR BLD AUTO: 12.1 %
MCH RBC QN AUTO: 32.1 PG (ref 26–33)
MCHC RBC AUTO-ENTMCNC: 31.2 GM/DL (ref 32.2–35.5)
MCV RBC AUTO: 102.7 FL (ref 81–99)
MONOCYTES ABSOLUTE: 0.7 THOU/MM3 (ref 0.4–1.3)
MONOCYTES NFR BLD AUTO: 7.7 %
NEUTROPHILS NFR BLD AUTO: 75.8 %
NRBC BLD AUTO-RTO: 0 /100 WBC
PLATELET # BLD AUTO: 275 THOU/MM3 (ref 130–400)
PMV BLD AUTO: 10.9 FL (ref 9.4–12.4)
POTASSIUM SERPL-SCNC: 4.5 MEQ/L (ref 3.5–5.2)
PREALB SERPL-MCNC: 10.6 MG/DL (ref 20–40)
PROT SERPL-MCNC: 6.1 G/DL (ref 6.1–8)
RBC # BLD AUTO: 2.93 MILL/MM3 (ref 4.2–5.4)
SEGMENTED NEUTROPHILS ABSOLUTE COUNT: 6.6 THOU/MM3 (ref 1.8–7.7)
SODIUM SERPL-SCNC: 137 MEQ/L (ref 135–145)
TRIGL SERPL-MCNC: 85 MG/DL (ref 0–199)
WBC # BLD AUTO: 8.7 THOU/MM3 (ref 4.8–10.8)

## 2024-03-15 PROCEDURE — 92523 SPEECH SOUND LANG COMPREHEN: CPT

## 2024-03-15 PROCEDURE — 97530 THERAPEUTIC ACTIVITIES: CPT

## 2024-03-15 PROCEDURE — 99232 SBSQ HOSP IP/OBS MODERATE 35: CPT | Performed by: PHYSICAL MEDICINE & REHABILITATION

## 2024-03-15 PROCEDURE — 90935 HEMODIALYSIS ONE EVALUATION: CPT

## 2024-03-15 PROCEDURE — 85025 COMPLETE CBC W/AUTO DIFF WBC: CPT

## 2024-03-15 PROCEDURE — 1180000000 HC REHAB R&B

## 2024-03-15 PROCEDURE — 90791 PSYCH DIAGNOSTIC EVALUATION: CPT | Performed by: PSYCHOLOGIST

## 2024-03-15 PROCEDURE — 97129 THER IVNTJ 1ST 15 MIN: CPT

## 2024-03-15 PROCEDURE — 80053 COMPREHEN METABOLIC PANEL: CPT

## 2024-03-15 PROCEDURE — 2580000003 HC RX 258: Performed by: PHYSICAL MEDICINE & REHABILITATION

## 2024-03-15 PROCEDURE — 97535 SELF CARE MNGMENT TRAINING: CPT

## 2024-03-15 PROCEDURE — 36415 COLL VENOUS BLD VENIPUNCTURE: CPT

## 2024-03-15 PROCEDURE — 6360000002 HC RX W HCPCS: Performed by: PHYSICAL MEDICINE & REHABILITATION

## 2024-03-15 PROCEDURE — 6370000000 HC RX 637 (ALT 250 FOR IP): Performed by: PHYSICAL MEDICINE & REHABILITATION

## 2024-03-15 PROCEDURE — 80061 LIPID PANEL: CPT

## 2024-03-15 PROCEDURE — 97110 THERAPEUTIC EXERCISES: CPT

## 2024-03-15 PROCEDURE — 97166 OT EVAL MOD COMPLEX 45 MIN: CPT

## 2024-03-15 PROCEDURE — 5A1D70Z PERFORMANCE OF URINARY FILTRATION, INTERMITTENT, LESS THAN 6 HOURS PER DAY: ICD-10-PCS | Performed by: PHYSICAL MEDICINE & REHABILITATION

## 2024-03-15 PROCEDURE — 99222 1ST HOSP IP/OBS MODERATE 55: CPT | Performed by: INTERNAL MEDICINE

## 2024-03-15 PROCEDURE — 97162 PT EVAL MOD COMPLEX 30 MIN: CPT

## 2024-03-15 PROCEDURE — 84134 ASSAY OF PREALBUMIN: CPT

## 2024-03-15 RX ORDER — TRAZODONE HYDROCHLORIDE 100 MG/1
100 TABLET ORAL NIGHTLY
Status: DISCONTINUED | OUTPATIENT
Start: 2024-03-15 | End: 2024-03-30 | Stop reason: HOSPADM

## 2024-03-15 RX ADMIN — BUPROPION HYDROCHLORIDE 300 MG: 300 TABLET, FILM COATED, EXTENDED RELEASE ORAL at 10:05

## 2024-03-15 RX ADMIN — HYDROCODONE BITARTRATE AND ACETAMINOPHEN 2 TABLET: 5; 325 TABLET ORAL at 17:56

## 2024-03-15 RX ADMIN — AMLODIPINE BESYLATE 5 MG: 5 TABLET ORAL at 10:05

## 2024-03-15 RX ADMIN — Medication 6 MG: at 21:11

## 2024-03-15 RX ADMIN — HYDROCODONE BITARTRATE AND ACETAMINOPHEN 2 TABLET: 5; 325 TABLET ORAL at 10:12

## 2024-03-15 RX ADMIN — NALOXEGOL OXALATE 12.5 MG: 12.5 TABLET, FILM COATED ORAL at 05:42

## 2024-03-15 RX ADMIN — HYDROCODONE BITARTRATE AND ACETAMINOPHEN 2 TABLET: 5; 325 TABLET ORAL at 04:26

## 2024-03-15 RX ADMIN — HEPARIN SODIUM 5000 UNITS: 5000 INJECTION INTRAVENOUS; SUBCUTANEOUS at 10:05

## 2024-03-15 RX ADMIN — ONDANSETRON 4 MG: 4 TABLET, ORALLY DISINTEGRATING ORAL at 19:46

## 2024-03-15 RX ADMIN — ATORVASTATIN CALCIUM 10 MG: 10 TABLET, FILM COATED ORAL at 10:06

## 2024-03-15 RX ADMIN — DOCUSATE SODIUM 100 MG: 100 CAPSULE, LIQUID FILLED ORAL at 21:11

## 2024-03-15 RX ADMIN — DOCUSATE SODIUM 100 MG: 100 CAPSULE, LIQUID FILLED ORAL at 10:06

## 2024-03-15 RX ADMIN — HEPARIN SODIUM 5000 UNITS: 5000 INJECTION INTRAVENOUS; SUBCUTANEOUS at 21:11

## 2024-03-15 RX ADMIN — MONTELUKAST SODIUM 10 MG: 10 TABLET ORAL at 10:07

## 2024-03-15 RX ADMIN — SODIUM CHLORIDE, PRESERVATIVE FREE 10 ML: 5 INJECTION INTRAVENOUS at 10:09

## 2024-03-15 RX ADMIN — CLOPIDOGREL BISULFATE 75 MG: 75 TABLET ORAL at 10:06

## 2024-03-15 RX ADMIN — DOCUSATE SODIUM 283 MG: 283 LIQUID RECTAL at 18:10

## 2024-03-15 RX ADMIN — FOLIC ACID 500 MCG: 1 TABLET ORAL at 10:05

## 2024-03-15 RX ADMIN — SEVELAMER CARBONATE 800 MG: 800 TABLET, FILM COATED ORAL at 17:56

## 2024-03-15 ASSESSMENT — PAIN DESCRIPTION - ORIENTATION
ORIENTATION: LEFT

## 2024-03-15 ASSESSMENT — PAIN - FUNCTIONAL ASSESSMENT
PAIN_FUNCTIONAL_ASSESSMENT: PREVENTS OR INTERFERES SOME ACTIVE ACTIVITIES AND ADLS
PAIN_FUNCTIONAL_ASSESSMENT: PREVENTS OR INTERFERES SOME ACTIVE ACTIVITIES AND ADLS

## 2024-03-15 ASSESSMENT — PAIN DESCRIPTION - LOCATION
LOCATION: HIP
LOCATION: LEG
LOCATION: HIP

## 2024-03-15 ASSESSMENT — PAIN SCALES - GENERAL
PAINLEVEL_OUTOF10: 5
PAINLEVEL_OUTOF10: 8
PAINLEVEL_OUTOF10: 10
PAINLEVEL_OUTOF10: 8

## 2024-03-15 ASSESSMENT — PAIN DESCRIPTION - DESCRIPTORS
DESCRIPTORS: ACHING

## 2024-03-15 NOTE — PROGRESS NOTES
Physical Medicine & Rehabilitation Progress Note    Chief Complaint:  Left hip pain due to left hip fracture requiring left hip hemiarthroplasty surgery    Subjective:    Deloris Watts is a 68 y.o. right-handed  female with history of hypertension, hyperlipidemia, hydronephrosis, fibromyalgia, irritable bowel syndrome, osteoporosis, end-stage renal disease on hemodialysis (MWF) since January 2023, stroke with left side weakness in 1990s, anemia, depression, anxiety, right wrist and right ankle fracture treated conservatively, right proximal humeral fracture due to fall requiring ORIF, status post right carpal tunnel release surgery, status post cervical spine surgery, status post hysterectomy and bilateral oophorectomy, hemorrhoidectomy, sinus surgery, tubal ligation, L2 compression fracture requiring kyphoplasty, LASEK surgery, was admitted on 3/14/2024 for intensive inpatient management of impairment & disability secondary to displaced left femur neck fracture as result of fall on 3/10/2024 requiring left hip hemiarthroplasty surgery.     The patient was previously in inpatient rehab service from 12/21/2023 to 1/5/2024 for intensive inpatient management of impairment & disability secondary to fall accident on 12/14/2023 resulting L2 compression fracture requiring kyphoplasty, and liver laceration.  She was discharged to home on 1/5/2024 with referral for outpatient PT, OT and speech therapy.     The patient says while she was pulling up her pant after using toilet in her home's bathroom on 3/10/2024, she suddenly lost balance and fell to the ground hitting her left hip.  She thinks she also hit her head to something at the time.  She did not loss consciousness.  She immediately feels severe pain at her left hip and was unable to get up even with her 's assistance.  Therefore 911 was called and the patient was sent to [facility] ER by EMS.  Multiple x-ray and CAT scan were done.   lower extremity). Negative for dizziness, speech difficulty, light-headedness, numbness and headaches.   Psychiatric/Behavioral:  Negative for dysphoric mood, hallucinations and sleep disturbance. The patient is not nervous/anxious.         Objective:  BP (!) 128/55   Pulse 95   Temp 97.9 °F (36.6 °C) (Oral)   Resp 16   Ht 1.524 m (5')   Wt 46.7 kg (103 lb)   SpO2 98%   BMI 20.12 kg/m²   Physical Exam   General:  well-developed, well nourished  female; in no acute distress ; appropriate affect & mood; lying on bed comfortably but with her intermittent complaints of pain at left hip  Eyes: pupil equally round ; extra-ocular motion intact bilaterally  Head, Ear, Nose, Mouth & Throat : normocephalic ; no discharge from ears or nose ; no deformity ; no facial swelling ; oral mucosa pink   Neck :  supple ; tenderness at bilateral cervical paraspinal muscles; mild muscle spasm at the bilateral upper trapezius muscles  Cardiovascular : regular rate & rhythm ; normal S1 & S2 heart sound ; no murmur ; presence of hemodialysis catheter at right anterior upper chest wall; normal peripheral pulse at the bilateral upper extremities; slightly reduced pulse strength at the bilateral lower extremities  Pulmonary : Breath sound present at bilateral lung field; no wheezing ; no rale; no rhonchi; no crackle  Gastrointestinal : soft, protruded abdomen; no tenderness ; normal bowel sound present   Back : No tenderness with palpation; no muscle spasm  Skin: no skin lesion or rash ; no pitting edema at all 4 extremities; presence of nonpitting swelling at left upper thigh; presence of surgical scar at right anterior shoulder and upper arm; presence of adhesive dressing at left lateral hip and lateral upper thigh  Musculoskeletal : no limb asymmetry; no obvious limb deformity; presence of adhesive dressing at left lateral hip and upper lateral thigh associated with severe tenderness; mild tenderness at left anterior and

## 2024-03-15 NOTE — PLAN OF CARE
Problem: Discharge Planning  Goal: Discharge to home or other facility with appropriate resources  3/15/2024 1422 by Anastasiya Vasquez RN  Outcome: Progressing  Flowsheets (Taken 3/15/2024 1422)  Discharge to home or other facility with appropriate resources:   Identify barriers to discharge with patient and caregiver   Arrange for needed discharge resources and transportation as appropriate   Identify discharge learning needs (meds, wound care, etc)     Problem: Pain  Goal: Verbalizes/displays adequate comfort level or baseline comfort level  Outcome: Progressing  Flowsheets (Taken 3/15/2024 1422)  Verbalizes/displays adequate comfort level or baseline comfort level:   Encourage patient to monitor pain and request assistance   Assess pain using appropriate pain scale   Administer analgesics based on type and severity of pain and evaluate response     Problem: Skin/Tissue Integrity  Goal: Absence of new skin breakdown  Outcome: Progressing  Note: Dry and intact     Problem: ABCDS Injury Assessment  Goal: Absence of physical injury  Outcome: Progressing  Flowsheets (Taken 3/15/2024 1422)  Absence of Physical Injury: Implement safety measures based on patient assessment     Problem: Safety - Adult  Goal: Free from fall injury  Outcome: Progressing  Flowsheets (Taken 3/15/2024 1422)  Free From Fall Injury:   Instruct family/caregiver on patient safety   Based on caregiver fall risk screen, instruct family/caregiver to ask for assistance with transferring infant if caregiver noted to have fall risk factors
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
## 2024-03-15 NOTE — PROGRESS NOTES
[facility]  INPATIENT OCCUPATIONAL THERAPY  [facility]  EVALUATION    Time:    Time In: 930  Time Out: 1030  Timed Code Treatment Minutes: 45 Minutes  Minutes: 60          Date: 3/15/2024  Patient Name: Deloris Watts,   Gender: female      MRN: [id number]  : 1955  (68 y.o.)  Referring Practitioner: Jax Whatley MD  Diagnosis: Closed fracture of neck of left femur, initial encounter  Additional Pertinent Hx: Per H&P:Deloris Watts  is a 68 y.o. right-handed  female with history of hypertension, hyperlipidemia, hydronephrosis, fibromyalgia, irritable bowel syndrome, osteoporosis, end-stage renal disease on hemodialysis (MWF) since 2023, stroke with left side weakness in , anemia, depression, anxiety, right wrist and right ankle fracture treated conservatively, right proximal humeral fracture due to fall requiring ORIF, status post right carpal tunnel release surgery, status post cervical spine surgery, status post hysterectomy and bilateral oophorectomy, hemorrhoidectomy, sinus surgery, tubal ligation, L2 compression fracture requiring kyphoplasty, LASEK surgery, is admitted to the inpatient rehabilitation unit on 3/14/2024 for intensive inpatient rehabilitation treatment impairment and disability secondary to displaced left femur neck fracture as result of fall on 3/10/2024 requiring left hip hemiarthroplasty surgery.The patient says while she was pulling up her pant after using toilet in her home's bathroom on 3/10/2024, she suddenly lost balance and fell to the ground hitting her left hip.  She thinks she also hit her head to something at the time.  She did not loss consciousness.  She immediately feels severe pain at her left hip and was unable to get up even with her 's assistance.  Therefore 911 was called and the patient was sent to [facility] ER by EMS.  Multiple x-ray and CAT scan were done.  The patient was

## 2024-03-15 NOTE — PLAN OF CARE
Problem: Discharge Planning  Goal: Discharge to home or other facility with appropriate resources  3/15/2024 1052 by Kusum Guajardo LISW  Note:   [facility]  Physical Medicine Case Management Assessment    [x] Inpatient Rehabilitation Unit    Patient Name: Deloris Watts        MRN: [id number]    : 1955  (68 y.o.)  Gender: female   Date of Admission: 3/14/2024  5:39 PM    Family/Social/Home Environment:   Prior to admission, patient was living with her , Tenzin. Patient reported being modified independent at home with her walker. Patient was able to complete ambulation, ADLs and transfers. Tenzin was managing the housekeeping, meal prep, errands, finances and driving. Patient reports that Tenzin is supportive of patient and helping her with patient what she needs. Patient is retired. Patient's support includes Tenzin and patient's sister, Nicole. Nicole lives locally. Patient's family physician is Johana Weldon MD. Patient prefers [facility]. Patient is motivated to participate in therapy.    Social/Functional History  Lives With: Spouse  Type of Home: House  Home Layout: One level  Home Access: Stairs to enter without rails  Entrance Stairs - Number of Steps: 1 MARIANA with left sided grab bar on the wall  Bathroom Shower/Tub: Tub/Shower unit  Bathroom Equipment: Shower chair, Grab bars in shower  Bathroom Accessibility: Not accessible  Home Equipment: Walker, rolling, Walker, standard, Wheelchair-manual, Lift chair (transport chair)  Has the patient had two or more falls in the past year or any fall with injury in the past year?: Yes  ADL Assistance: Independent  Homemaking Assistance: Needs assistance (spouse completes cooking, cleaning, laundry and grocery shopping)  Homemaking Responsibilities: No  Ambulation Assistance: Independent  Transfer Assistance: Independent  Active : No  Patient's  Info: Pt can drive, but  does all the driving  Additional Comments:

## 2024-03-15 NOTE — PROGRESS NOTES
intervention--pt attempted but with unsafe hand placement and nearly breaking hip precautions.  Verbal cues then provided for safe hand placement and to maintain hip precautions and pt required Minimal Assistance, Moderate Assistance  Stand to Sit:Minimal Assistance  To/From Bed and Chair: Not safe to attempt without PT intervention--pt anxious, cues for hand placement to stand, assist to advance RW.. Minimal Assistance, Moderate Assistance     Ambulation:  Minimal Assistance, Moderate Assistance  Distance: 2', 5', 3', 2'  Surface: Level Tile  Device:Rolling Walker  Gait Deviations:  Forward Flexed Posture, Slow Anna, Decreased Step Length Bilaterally, Decreased Gait Speed, Decreased Heel Strike Bilaterally, Decreased Foot Clearance, Wide Base of Support, Mild Path Deviations, and Increased Reliance on Rolling Walker  *Verbal cues for posture as pt tends to lean towards left side, verbal cues for increased step length  *mirror placed in front of pt for one of gait trials for increased awareness of posture.  Pt still required verbal cues to correct posture    Wheelchair Mobility:  Contact Guard Assistance  Extremities Used: Bilateral Upper Extremities  Type of Chair:Manual  Surface: Level Tile  Distance: 11'  Quality: Slow Velocity, Short Strokes, Veers Right, and Decreased Fluidity   *unable to complete increased distance due to fatigue    Exercise:  Patient was guided in 1 set(s) 10 reps of exercise to both lower extremities.  Exercises were completed for increased independence with functional mobility.  Supine:  -glute sets 5\" hold  -quad sets 5\" hold  -heel slides with active assist left  -hip abduction right only  -ankle pumps x20  Seated:  -long arc quad x2 sets--verbal cues for full knee extension right LE -march right only  -knee flexion left  -gentle hamstring stretch 30\"x3 right, 20\"x2 left    Functional Outcome Measures: Not completed     Modified Cross Scale:  Not Applicable    ASSESSMENT:  Activity  chair transfer with a RW with modified independence to transfer surface to surface safely.  Long Term Goal 4: Patient will ambulate 50' with a RW with SBA to navigate home.  Long Term Goal 5: Patient will complete car transfer with SBA to transfer in and out of vehicle safely.  Additional Goals?: Yes  Long term goal 6: Patient will ascend/descend 1 step with 1 handrail and CGA for home entry.    Following session, patient left in safe position with all fall risk precautions in place.

## 2024-03-15 NOTE — FLOWSHEET NOTE
03/15/24 1610   Vital Signs   /74   Temp 97.8 °F (36.6 °C)   Pulse 86   Respirations 16   Weight - Scale 42.3 kg (93 lb 4.1 oz)   Weight Method Bed scale   Percent Weight Change -2.31   Post-Hemodialysis Assessment   Post-Treatment Procedures Blood returned;Catheter Capped, clamped with Saline x2 ports   Machine Disinfection Process Acid/Vinegar Clean;Heat Disinfect;Exterior Machine Disinfection   Rinseback Volume (ml) 400 ml   Blood Volume Processed (Liters) 57.1 L   Dialyzer Clearance Lightly streaked   Duration of Treatment (minutes) 150 minutes   Hemodialysis Output (ml) 1000 ml   Tolerated Treatment Good     completed 2.5 hr HD TX and removed 1 Liter of fluid. pt tolerated HD TX well. TX ended and all blood returned with 400 mls rinse back, togo's attached. CVC dressing is clean, dry and intact. report given to primary nurse, record completed and printed to be scanned into pt's EMR.

## 2024-03-15 NOTE — PROGRESS NOTES
copy of this was given to the patient/ family on this date.  Insurance Coverage  Your insurance company has made the following determination relative to the length of your stay:   Your estimated length of stay is 14 days   Your insurance Coverage has been verified as follows:    Primary Insurance: Payor: MEDICARE /  /  /    Deductible: $1632.00     Coverage: Active  Secondary Insurance:BCBS  Secondary insurance policies often cover co-pay amounts, but to ensure payment please contact your insurance company.    Alternative Resources: Please ask the  for more information [phone]

## 2024-03-15 NOTE — CONSULTS
[facility]          [address]        [address]        [place], [place] [id number] [phone]           Inpatient Initial consult note         3/15/2024 11:25 AM      Patient Name:   Deloris Watts  YOB: 1955  Primary Care Physician:  Johana Weldon MD   Admit Date:    3/14/2024  5:39 PM    Consultation requested by : Jax Whatley MD    Reason for Consult : Nephrology following for ESRD on hemodialysis.    History of presenting illness :Deloris Watts is a 68 y.o.   female with Past Medical History as stated below presented with a chief complaint of No chief complaint on file.   on 3/14/2024 .    Patient is a ESRD patient on hemodialysis Monday Wednesday Friday, had sustained a fall and injured her left hip for which she has undergone surgery and subsequently transferred to rehab    She is currently complains of some pain in the hip but not in any acute distress    Past History:  Past Medical History:   Diagnosis Date    Allergic rhinitis     Anemia in CKD (chronic kidney disease)     Anxiety     CHF (congestive heart failure) (HCC)     Closed fracture of right proximal humerus 09/18/2021    ORIF    Closed right ankle fracture     In 1990s; treated with casting    CVA (cerebral infarction)     in 1990s ; with left-sided weakness    Depression     Fibromyalgia     in 1990s    Headache(784.0)     Hemiplegia and hemiparesis following cerebral infarction affecting left non-dominant side (HCC)     in 1990s    Hydronephrosis 09/01/2023    Urogenital Implants, calculus of ureter, UTI    Hyperlipidemia     Hypertension     in 1980s    Irritable bowel syndrome     in 1990s    Kidney failure     Chronic Kidney Disease, Renal Dialysis started in 1/2023    Osteoporosis 2019    Unspecified cerebral artery occlusion with cerebral infarction     Unspecified sleep apnea     Wrist fracture, right 2018    Treated with casting     Past Surgical History:   Procedure

## 2024-03-15 NOTE — PROGRESS NOTES
[facility]  SPEECH THERAPY  [facility]  Speech - Language - Cognitive Evaluation+ Cognitive Treatment    SLP Individual Minutes  Time In: 905  Time Out: 935  Minutes: 30  Timed Code Treatment Minutes: 8 Minutes   Cognitive Tx: 8 minutes   Speech - Language - Cognitive Evaluation: 22 minutes    Date: 3/15/2024  Patient Name: Deloris Watts      CSN: [id number]   : 1955  (68 y.o.)  Gender: female   Referring Physician:  Dr. Jax Whatley  Diagnosis: Closed Fracture of Neck of Left Femur  Precautions: Fall Risk  History of Present Illness/Injury:  Patient admitted with above diagnosis. Per chart review,  \"Deloris Watts  is a 68 y.o. right-handed  female with history of hypertension, hyperlipidemia, hydronephrosis, fibromyalgia, irritable bowel syndrome, osteoporosis, end-stage renal disease on hemodialysis (MWF) since 2023, stroke with left side weakness in , anemia, depression, anxiety, right wrist and right ankle fracture treated conservatively, right proximal humeral fracture due to fall requiring ORIF, status post right carpal tunnel release surgery, status post cervical spine surgery, status post hysterectomy and bilateral oophorectomy, hemorrhoidectomy, sinus surgery, tubal ligation, L2 compression fracture requiring kyphoplasty, LASEK surgery, is admitted to the inpatient rehabilitation unit on 3/14/2024 for intensive inpatient rehabilitation treatment impairment and disability secondary to displaced left femur neck fracture as result of fall on 3/10/2024 requiring left hip hemiarthroplasty surgery.     The patient was previously in inpatient rehab service from 2023 to 2024 for intensive inpatient management of impairment & disability secondary to fall accident on 2023 resulting L2 compression fracture requiring kyphoplasty, and liver laceration.  She was discharged to home on 2024 with referral for outpatient PT, OT  Term Goals: 1 Week  Goal 1: Patient will complete delayed recall tasks of 3+ units of information (including O-Log) with 75% accuracy, with or without use of compensatory strategies, given mod cues, to improve retention of novel information.    Goal 2: Patient will complete functional executive functioning (basic finances, medications), problem solving (time, money) and verbal/visual reasoning tasks with 70% accuracy given mod cues for potential return to PLOF.    Goal 3: Patient will complete thought organization tasks with 70% accuracy given mod cues to improve processing speed and organization during ADL tasks.    Goal 4: Patient will complete attention tasks (sustained, selective, alternating, divided) with no more than 5 errors/redirections given mod cues multi-tasking during ADL completion.    Goal 5: Patient will adhere to modified low stimulation guidelines, including completion of the ACE, with score <8 over 3 consecutive days to maximize neurological healing.  INTERVENTIONS:  Acute Concussion Evaluation (ACE):   Physical Symptoms: 5/10  Cognitive Symptoms: 4/4  Emotional Symptoms: 2/4  Sleep Symptoms: 2/4  *Suspect current level of \"drowsiness\" impacted comprehension of questions.      Acute Concussion Evaluation (ACE) completed this date following admission with documented concern for possible concussion. Cognitive linguistic evaluation, MOCA 7.3,  was completed with score of 11/30 derived, indicating a mild impairment level. ACE score of 13/22 calculated; It should be noted that a score with concerns for concussion are greater than 12/22.    *Unable to review low stimulation guidelines r/t time constraints; will review in subsequent session.    LONG TERM GOALS:  Long Term Goals  Time Frame for Long Term Goals: 2 Weeks  Goal 1: Patient will improve cognitive functioning to minimal assist (FIM 4) to improve return to least restrictive environment.       MARICRUZ Paulson. Speech Intern

## 2024-03-15 NOTE — CONSULTS
Comprehensive Nutrition Assessment    Type and Reason for Visit:  Initial, Consult (New Rehab Admit)    Nutrition Recommendations/Plan:   Recommend a Multivitamin daily.  Continue current diet.  Started Magic Cup daily and Ensure Compact BID.     Malnutrition Assessment:  Malnutrition Status:  Moderate malnutrition (03/15/24 0938)    Context:  Chronic Illness     Findings of the 6 clinical characteristics of malnutrition:  Energy Intake:  75% or less estimated energy requirements for 1 month or longer  Weight Loss:  Unable to assess (pt ESRD on HD - fluctuates frequently)     Body Fat Loss:  Mild body fat loss Orbital, Triceps, Fat Overlying Ribs   Muscle Mass Loss:   (moderate losses) Temples (temporalis), Clavicles (pectoralis & deltoids), Scapula (trapezius)  Fluid Accumulation:  Mild Extremities   Strength:  Not Performed    Nutrition Assessment: Pt. moderately malnourished AEB criteria as listed above.  At risk for further nutrition compromise r/t increased nutrient needs to aid in wound healing, closed fx of L femur nec - s/p L hip misael (3/11), ESRD on HD, and underlying medical condition (PMHx: anxiety, CHF, CVA - residual left sided misael-paresis, depression, HLD, HTN, IBS, osteoporosis, sleep apnea).    Nutrition Related Findings:    Pt. Report/Treatments/Miscellaneous: Pt seen- she reports decreased appetite appetite over the past month. Pt reports fair intake of meals over the past few days consuming ~50% of most meals. Pt states she occasionally consumed the Ensure Compact when she received it. Pt reports she would like to try the Magic Cups daily. ESRD on HD. [facility] currently out of [facility].  GI Status: last BM x1 on 3/15.  Pertinent Labs: BUN 54, Cr. 5.6  Pertinent Meds: Colace, Folic Acid, Movantik, Senokot, Lipitor, Renvela   Wound Type:  (s/p Left Misael Hip Arthroplasty on 3/11/24)       Current Nutrition Intake & Therapies:    Average Meal Intake: 26-50% (pt reports consuming ~50% of most  meals)  Average Supplements Intake: 1-25% (pt reports)  ADULT DIET; Regular  ADULT ORAL NUTRITION SUPPLEMENT; Lunch; Frozen Oral Supplement  ADULT ORAL NUTRITION SUPPLEMENT; Dinner, Breakfast; Standard 4 oz Oral Supplement    Anthropometric Measures:  Height: 152.4 cm (5')  Ideal Body Weight (IBW): 100 lbs (45 kg)    Admission Body Weight: 46.7 kg (103 lb) (3/15; +trace; non-pitting edema LLE)  Current Body Weight: 46.7 kg (103 lb) (3/15; +trace; non-pitting edema LLE)  Current BMI (kg/m2): 20.1  Usual Body Weight:  (100 lbs per pt report. Per EMR: per EMR: 4/28/23: 102# 15 oz, 8/1/23: 102# 10 oz, 12/18/23: 93# 11 oz, 1/5/24: 93# 11 oz)  BMI Categories: Normal Weight (BMI 18.5-24.9)    Estimated Daily Nutrient Needs:  Energy Requirements Based On: Kcal/kg  Weight Used for Energy Requirements: Current (46.7 kg)  Energy (kcal/day): 1530-4801 kcal/day (25-30 kcal/kg)  Protein (g/day): 56-70 g/day (1.2-1.5 g/kg) ESRD on HD    Nutrition Diagnosis:   Moderate malnutrition, In context of acute illness or injury related to inadequate protein-energy intake as evidenced by intake 0-25%, poor intake prior to admission, moderate muscle loss, mild loss of subcutaneous fat    Nutrition Interventions:   Food and/or Nutrient Delivery: Continue Current Diet, Start Oral Nutrition Supplement, Vitamin Supplement  Nutrition Education/Counseling: Education initiated (Encouraged po intake of meals and ONS at best effort)  Coordination of Nutrition Care: Continue to monitor while inpatient    Goals:  Previous Goal Met: Progressing toward Goal(s)  Goals: PO intake 75% or greater, by next RD assessment    Nutrition Monitoring and Evaluation:   Food/Nutrient Intake Outcomes: Food and Nutrient Intake, Supplement Intake, Vitamin/Mineral Intake, Diet Advancement/Tolerance  Physical Signs/Symptoms Outcomes: Biochemical Data, Weight, Skin, Nutrition Focused Physical Findings, GI Status, Fluid Status or Edema    Discharge Planning:    Continue

## 2024-03-15 NOTE — PROGRESS NOTES
place on foot, and pull. Pt would benefit from further education on LHAE      Current functional status for bed, chair, wheelchair transfers: Sit to Stand:  Minimal Assistance, X 1, with increased time for completion, cues for hand placement. From recliner.   Stand to Sit: Minimal Assistance, with increased time for completion, cues for hand placement.      Current functional status for toilet transfers: Minimal assistance x1 toilet transfer     Current functional status for locomotion: Minimal Assistance, X 1  Distance: 2 feet from bed to chair.   Surface: Level Tile  Device:Rolling Walker  Gait Deviations:  Pt with very slowed elizabeth, wide DENNIS with short, shuffling steps. Increased reliance on walker and cues needed to fully line herself up to chair prior to sitting down. Limited by pain.      Current functional status for bladder management: Minimal contact assistance     Current functional status for bowel management:Moderate assistance     Current functional status for comprehension: Minimal assistance     Current functional status for expression: Minimal assistance     Current functional status for social interaction: Minimal assistance     Current functional status for problem solving: Minimal assistance      Current functional status for memory: Minimal assistance     Expected level of Improvement in Self-Care:  Modified independence     Expected level of Improvement in Sphincter Control:  Modified independence     Expected level of Improvement in Transfers: Modified independence     Expected level of Improvement in Locomotion:  Modified independence     Expected level of Improvement in Communication and Social Cognition: Modified independence      Expected length of time to achieve that level of improvement: 2 weeks     Current rehab issues: ADL dysfunction, bladder management, bowel management, carry over of therapy techniques, discharge planning, disease and co-morbidity management, gait/mobility  Disclosure Statement provided to patient.  Patient verbalized understanding.      Patient requires an intensive and coordinate interdisciplinary team approach to the delivery of rehabilitation care including PT/OT/ST and 24 hour nursing care and physician supervision to safely return to home setting with family.        I have reviewed and concur with the findings and results of the pre-admission screening assessment completed by the Inpatient Rehabilitation Admissions Coordinator.     ANTON WHATLEY MD                 Revision History    Date/Time User Provider Type Action   3/14/2024 10:05 AM Anton Whatley MD Physician Sign   3/13/2024 10:30 AM Felice Whiting RN Registered Nurse Sign    View Details Report

## 2024-03-15 NOTE — PROGRESS NOTES
reaching 5 degrees on right side and 0 degree on left side; normal functional joints ROM at the rest of bilateral upper & lower extremities  Cerebral :  alert ; awake ; oriented to place, person and time; follow 1-2 step verbal command  Cerebellum : no dysmetria with bilateral finger-to-nose test but with tremor; heel-to-shin test could not be performed; no dysdiadochokinesia with bilateral forearms rapid supination/pronation  Cranial Nerves :  grossly intact CN II to XII function  Sensory : intact light touch and pin prick sensation at bilateral upper & lower extremities  Motor : normal muscle tone at bilateral upper & right lower extremities ; left lower extremity muscle tone not assessed; 4+/5 to 5/5 muscle strength at the right wrist extension; 4-/5 to 4+/5  muscle strength at bilateral fingers abduction; 4-/5 muscle strength at the bilateral handgrips; 2+/5 to 3-/5 muscle strength at the right hip flexion limited by increasing left hip pain; 4+/5 muscle strength at the right knee flexion; 1/5 to 2-/5 muscle strength at left hip flexion and abduction limited by significantly increasing left hip pain; 2-/5 muscle strength at left hip adduction limited by pain at the left hip; 2+/5 to 3-/5 muscle strength at left knee flexion due to increasing left hip pain; 3-/5 muscle strength at left knee extension due to increase in left hip pain; 4-/5 to 4+/5 muscle strength at the left ankle dorsiflexion and plantarflexion; normal 5/5 muscle strength at the rest of bilateral upper & lower extremities  Reflex : 2+ bilateral bicep and bilateral brachioradialis reflexes; 0 bilateral knees reflexes  Pathological Reflex :  No Jossy's sign ; no ankle clonus;  Gait : Not assessed      Diagnostics:   No results found for this or any previous visit (from the past 24 hour(s)).      Impression:  Status post fall on 3/10/2024 in the bathroom resulting  Displaced left femur neck fracture requiring left hip hemiarthroplasty  hypertension  Continue Lipitor for hyperlipidemia  Continue Wellbutrin XL for depression  Continue Singulair for allergic rhinitis  Continue Plavix for secondary stroke prevention  Continue sevelamer for hypocalcemia prevention in renal patient  Continue folic acid supplement  Continue melatonin, trazodone for insomnia; increase trazodone dosage from 50 mg to 100 mg  Continue hemodialysis Monday, Wednesday and Friday under the direction of nephrology service  Nutrition: Continue current regular diet  Bladder: Monitoring signs or symptoms of UTI  Bowel: Monitoring signs or symptoms of constipation   and case management for coordination of care and discharge planning        Missed Therapy Time:  None      ANTON DAVIS MD     This report has been created using voice recognition software. It may contain minor errors which are inherent in voice recognition technology

## 2024-03-15 NOTE — CONSULTS
Department of Family Practice  Consult Note        Reason for Consult:  Medical management while on the Inpatient Rehab unit.    Requesting Physician:  Dr Whatley    CHIEF COMPLAINT:   The need to continue the intensive time with therapies following the acute hospital stay.    History Obtained From:  patient, EMR    HISTORY OF PRESENT ILLNESS:              The patient is a 68 y.o. female with significant past medical history of       Diagnosis Date    Allergic rhinitis     Anemia in CKD (chronic kidney disease)     Anxiety     CHF (congestive heart failure) ([facility])     Closed fracture of right proximal humerus 09/18/2021    ORIF    Closed right ankle fracture     In 1990s; treated with casting    CVA (cerebral infarction)     in 1990s ; with left-sided weakness    Depression     Fibromyalgia     in 1990s    Headache(784.0)     Hemiplegia and hemiparesis following cerebral infarction affecting left non-dominant side (HCC)     in 1990s    Hydronephrosis 09/01/2023    Urogenital Implants, calculus of ureter, UTI    Hyperlipidemia     Hypertension     in 1980s    Irritable bowel syndrome     in 1990s    Kidney failure     Chronic Kidney Disease, Renal Dialysis started in 1/2023    Osteoporosis 2019    Unspecified cerebral artery occlusion with cerebral infarction     Unspecified sleep apnea     Wrist fracture, right 2018    Treated with casting      who presents with having fallen in her bathroom and fracturing the left hip. She was medically cleared and had the hip surgically repaired. She did well post op and now has come to the Inpatient Rehab Unit to continue the time with therapies prior to a discharge disposition being made.    Past Medical History:        Diagnosis Date    Allergic rhinitis     Anemia in CKD (chronic kidney disease)     Anxiety     CHF (congestive heart failure) ([facility])     Closed fracture of right proximal humerus 09/18/2021    ORIF    Closed right ankle fracture     In 1990s; treated with casting     300 mg, 300 mg, Oral, Daily  [START ON 3/15/2024] clopidogrel (PLAVIX) tablet 75 mg, 75 mg, Oral, Daily  [START ON 3/15/2024] docusate sodium (ENEMEEZ) enema 283 mg, 1 enema, Rectal, Daily  [START ON 3/15/2024] folic acid (FOLVITE) tablet 500 mcg, 500 mcg, Oral, Daily  heparin (porcine) injection 5,000 Units, 5,000 Units, SubCUTAneous, BID  [START ON 3/15/2024] montelukast (SINGULAIR) tablet 10 mg, 10 mg, Oral, Daily  [START ON 3/15/2024] naloxegol (MOVANTIK) tablet 12.5 mg, 12.5 mg, Oral, QAM AC  senna (SENOKOT) tablet 17.2 mg, 2 tablet, Oral, Nightly  [START ON 3/15/2024] sevelamer (RENVELA) tablet 800 mg, 800 mg, Oral, Lunch  sodium chloride flush 0.9 % injection 5-40 mL, 5-40 mL, IntraVENous, 2 times per day  traZODone (DESYREL) tablet 50 mg, 50 mg, Oral, Nightly  docusate sodium (COLACE) capsule 100 mg, 100 mg, Oral, BID  bisacodyl (DULCOLAX) suppository 10 mg, 10 mg, Rectal, Daily PRN  0.9 % sodium chloride infusion, , IntraVENous, PRN  dextrose 10 % infusion, , IntraVENous, Continuous PRN  dextrose bolus 10% 125 mL, 125 mL, IntraVENous, PRN **OR** dextrose bolus 10% 250 mL, 250 mL, IntraVENous, PRN  docusate sodium (ENEMEEZ) enema 283 mg, 1 enema, Rectal, Daily PRN  glucagon injection 1 mg, 1 mg, SubCUTAneous, PRN  glucose chewable tablet 16 g, 4 tablet, Oral, PRN  HYDROcodone-acetaminophen (NORCO) 5-325 MG per tablet 1 tablet, 1 tablet, Oral, Q4H PRN **OR** HYDROcodone-acetaminophen (NORCO) 5-325 MG per tablet 2 tablet, 2 tablet, Oral, Q4H PRN  linaclotide (LINZESS) capsule 290 mcg  (Patient Supplied), 290 mcg, Oral, Daily PRN  magnesium hydroxide (MILK OF MAGNESIA) 400 MG/5ML suspension 15 mL, 15 mL, Oral, Daily PRN  ondansetron (ZOFRAN-ODT) disintegrating tablet 4 mg, 4 mg, Oral, Q8H PRN  polyethylene glycol (GLYCOLAX) packet 17 g, 17 g, Oral, Daily PRN  sodium chloride flush 0.9 % injection 5-40 mL, 5-40 mL, IntraVENous, PRN  melatonin tablet 6 mg, 6 mg, Oral, Nightly  acetaminophen (TYLENOL) tablet 650

## 2024-03-15 NOTE — PLAN OF CARE
Problem: Pain  Goal: Verbalizes/displays adequate comfort level or baseline comfort level  Outcome: Progressing  Flowsheets (Taken 3/14/2024 2030)  Verbalizes/displays adequate comfort level or baseline comfort level: Encourage patient to monitor pain and request assistance     Problem: Skin/Tissue Integrity  Goal: Absence of new skin breakdown  Description: 1.  Monitor for areas of redness and/or skin breakdown  2.  Assess vascular access sites hourly  Outcome: Progressing     Problem: ABCDS Injury Assessment  Goal: Absence of physical injury  Outcome: Progressing     Problem: Safety - Adult  Goal: Free from fall injury  Outcome: Progressing  Note: Provided safe environment, call light within reach and hourly rounds in place     Care plan reviewed with patient.  Patient verbalize understanding of the plan of care and contribute to goal setting.

## 2024-03-15 NOTE — PROGRESS NOTES
[facility]  Recreational Therapy  Inpatient Rehabilitation Evaluation        Time Spent with Patient: 10 minutes    Date:  3/15/2024       Patient Name: Deloris Watts      MRN: [id number]       YOB: 1955 (68 y.o.)       Gender: female          RESTRICTIONS/PRECAUTIONS:  Restrictions/Precautions: Weight Bearing, Fall Risk     Hearing: Within functional limits    PAIN: 8    SUBJECTIVE:  pt lives with  Tenzin-she has a sister she is close with-altogether her and her  have 5 children     VISION:  Glasses    HEARING: Within Normal Limit    LEISURE INTERESTS:   Upon arrival pt's lunch tray available due to pt going to dialysis soon-raised the HOB for pt to be able to eat safely and high enough that she could tolerate-pt states that after her last admission she was able to go out to eat a few times with friends and family- does the cooking, cleaning, grocery shopping, laundry and driving-pt states her pain is 8/10 and nurse recently gave pt pain medication-pt's affect is flat-she likes to get on her phone in her free time-    BARRIERS TO LEISURE INTERESTS:    Decreased endurance, Lower extremity weakness, and Pain        Patient Education  New Education Provided: Importance of Leisure, RT Plan of Care    Plan:  Continue to follow patient through this admission  Include patient in appropriate groups  See patient individually    Electronically signed by: LOUIS Rodriguez  Date: 3/15/2024

## 2024-03-16 PROCEDURE — 99232 SBSQ HOSP IP/OBS MODERATE 35: CPT | Performed by: PHYSICAL MEDICINE & REHABILITATION

## 2024-03-16 PROCEDURE — 97129 THER IVNTJ 1ST 15 MIN: CPT

## 2024-03-16 PROCEDURE — 97130 THER IVNTJ EA ADDL 15 MIN: CPT

## 2024-03-16 PROCEDURE — 6360000002 HC RX W HCPCS: Performed by: PHYSICAL MEDICINE & REHABILITATION

## 2024-03-16 PROCEDURE — 97110 THERAPEUTIC EXERCISES: CPT

## 2024-03-16 PROCEDURE — 6370000000 HC RX 637 (ALT 250 FOR IP): Performed by: PHYSICAL MEDICINE & REHABILITATION

## 2024-03-16 PROCEDURE — 97116 GAIT TRAINING THERAPY: CPT

## 2024-03-16 PROCEDURE — 97530 THERAPEUTIC ACTIVITIES: CPT

## 2024-03-16 PROCEDURE — 1180000000 HC REHAB R&B

## 2024-03-16 PROCEDURE — 99232 SBSQ HOSP IP/OBS MODERATE 35: CPT | Performed by: INTERNAL MEDICINE

## 2024-03-16 PROCEDURE — 97535 SELF CARE MNGMENT TRAINING: CPT

## 2024-03-16 RX ADMIN — SEVELAMER CARBONATE 800 MG: 800 TABLET, FILM COATED ORAL at 13:04

## 2024-03-16 RX ADMIN — DOCUSATE SODIUM 283 MG: 283 LIQUID RECTAL at 13:04

## 2024-03-16 RX ADMIN — HEPARIN SODIUM 5000 UNITS: 5000 INJECTION INTRAVENOUS; SUBCUTANEOUS at 21:41

## 2024-03-16 RX ADMIN — SENNOSIDES 17.2 MG: 8.6 TABLET, FILM COATED ORAL at 21:41

## 2024-03-16 RX ADMIN — HYDROCODONE BITARTRATE AND ACETAMINOPHEN 2 TABLET: 5; 325 TABLET ORAL at 13:57

## 2024-03-16 RX ADMIN — BUPROPION HYDROCHLORIDE 300 MG: 300 TABLET, FILM COATED, EXTENDED RELEASE ORAL at 09:57

## 2024-03-16 RX ADMIN — NALOXEGOL OXALATE 12.5 MG: 12.5 TABLET, FILM COATED ORAL at 06:08

## 2024-03-16 RX ADMIN — ATORVASTATIN CALCIUM 10 MG: 10 TABLET, FILM COATED ORAL at 09:57

## 2024-03-16 RX ADMIN — FOLIC ACID 500 MCG: 1 TABLET ORAL at 09:57

## 2024-03-16 RX ADMIN — DOCUSATE SODIUM 100 MG: 100 CAPSULE, LIQUID FILLED ORAL at 21:41

## 2024-03-16 RX ADMIN — AMLODIPINE BESYLATE 5 MG: 5 TABLET ORAL at 09:57

## 2024-03-16 RX ADMIN — Medication 6 MG: at 21:41

## 2024-03-16 RX ADMIN — HYDROCODONE BITARTRATE AND ACETAMINOPHEN 2 TABLET: 5; 325 TABLET ORAL at 06:08

## 2024-03-16 RX ADMIN — MONTELUKAST SODIUM 10 MG: 10 TABLET ORAL at 09:57

## 2024-03-16 RX ADMIN — CLOPIDOGREL BISULFATE 75 MG: 75 TABLET ORAL at 09:57

## 2024-03-16 RX ADMIN — HEPARIN SODIUM 5000 UNITS: 5000 INJECTION INTRAVENOUS; SUBCUTANEOUS at 09:57

## 2024-03-16 RX ADMIN — DOCUSATE SODIUM 100 MG: 100 CAPSULE, LIQUID FILLED ORAL at 09:57

## 2024-03-16 RX ADMIN — HYDROCODONE BITARTRATE AND ACETAMINOPHEN 2 TABLET: 5; 325 TABLET ORAL at 09:52

## 2024-03-16 ASSESSMENT — PAIN - FUNCTIONAL ASSESSMENT
PAIN_FUNCTIONAL_ASSESSMENT: PREVENTS OR INTERFERES SOME ACTIVE ACTIVITIES AND ADLS
PAIN_FUNCTIONAL_ASSESSMENT: ACTIVITIES ARE NOT PREVENTED
PAIN_FUNCTIONAL_ASSESSMENT: ACTIVITIES ARE NOT PREVENTED

## 2024-03-16 ASSESSMENT — PAIN DESCRIPTION - ORIENTATION
ORIENTATION: LEFT

## 2024-03-16 ASSESSMENT — PAIN SCALES - GENERAL
PAINLEVEL_OUTOF10: 10
PAINLEVEL_OUTOF10: 10
PAINLEVEL_OUTOF10: 8
PAINLEVEL_OUTOF10: 0
PAINLEVEL_OUTOF10: 8

## 2024-03-16 ASSESSMENT — PAIN DESCRIPTION - DESCRIPTORS
DESCRIPTORS: ACHING;SORE;SHARP
DESCRIPTORS: ACHING

## 2024-03-16 ASSESSMENT — PAIN DESCRIPTION - LOCATION
LOCATION: HIP
LOCATION: HIP;LEG
LOCATION: HIP
LOCATION: HIP;LEG

## 2024-03-16 NOTE — PROGRESS NOTES
[facility]   PROGRESS NOTE      Patient: Deloris Watts  Room #: 8K-17/017-A            YOB: 1955  Age: 68 y.o.  Gender: female            Admit Date & Time: 3/14/2024  5:39 PM    Situation:    This is a spiritual encounter in response to Rn request for patient support. Patient, Deloris, was in her bed with her spouse and daughter at bedside. Patient appeared tired and dozed off at one point during the conversation.    Assessment:  Patient shared her weariness about being back in the hospital after other hospital stays. She shared her desire to be home, to be back in the house with her  and to be reunited with her dog, Sergio.     Intervention:   provided empathic listening and reflection as well as information regarding ongoing  services and availability.    Plan:   team will remain available to support patient/family, PRN.         Electronically signed by Chaplain Keegan Camarillo M.Div, on 3/16/2024 at 4:22 PM.     Spiritual Care Department  [address] [address]  [phone]

## 2024-03-16 NOTE — PROGRESS NOTES
Patient: Deloris Watts  Unit/Bed: 8K-17/017-A  YOB: 1955  MRN: [id number] Acct: [id number]   Admitting Diagnosis: Closed fracture of neck of left femur, initial encounter ([facility]) [S72.002A]  Admit Date:  3/14/2024  Hospital Day: 2    Assessment:     Principal Problem:    Closed fracture of neck of left femur, initial encounter ([facility])  Active Problems:    Depression    Anxiety disorder    History of CVA (cerebrovascular accident)    Hyperlipidemia    Allergic rhinitis    Essential hypertension    Generalized anxiety disorder    ESRD on hemodialysis ([facility])    Chronic constipation  Resolved Problems:    * No resolved hospital problems. *      Plan:     Continue to follow        Subjective:     Patient has no complaint of CP or SOB..   Medication side effects: none    Scheduled Meds:   traZODone  100 mg Oral Nightly    amLODIPine  5 mg Oral Daily    atorvastatin  10 mg Oral Daily    buPROPion  300 mg Oral Daily    clopidogrel  75 mg Oral Daily    docusate sodium  1 enema Rectal Daily    folic acid  500 mcg Oral Daily    heparin (porcine)  5,000 Units SubCUTAneous BID    montelukast  10 mg Oral Daily    naloxegol  12.5 mg Oral QAM AC    senna  2 tablet Oral Nightly    sevelamer  800 mg Oral Lunch    docusate sodium  100 mg Oral BID    melatonin  6 mg Oral Nightly     Continuous Infusions:   sodium chloride      dextrose       PRN Meds:bisacodyl, sodium chloride, dextrose, dextrose bolus **OR** dextrose bolus, docusate sodium, glucagon (rDNA), glucose, HYDROcodone 5 mg - acetaminophen **OR** HYDROcodone 5 mg - acetaminophen, linaclotide, magnesium hydroxide, ondansetron, polyethylene glycol, sodium chloride flush, acetaminophen, diclofenac sodium    Review of Systems  Pertinent items are noted in HPI.    Objective:     Patient Vitals for the past 8 hrs:   BP Temp Temp src Pulse Resp   03/16/24 1357 -- -- -- -- 18   03/16/24 0952 -- -- -- -- 18   03/16/24 0950 127/85 98.2 °F (36.8 °C) Oral 88 16     I/O last

## 2024-03-16 NOTE — PROGRESS NOTES
[facility]   Nephrology progress note  3/16/2024, 1:17 PM      Pt Name:    Deloris Watts  MRN:     [id number]     YOB: 1955  Admit Date:    3/14/2024  5:39 PM    Chief Complaint: Nephrology following for ESRD and HD    Subjective:  Patient was seen and examined this morning  No chest pain or shortness of breath  Feels okay  Sitting at her bedside chair  Reports some left hip pain    Objective:  24HR INTAKE/OUTPUT:    Intake/Output Summary (Last 24 hours) at 3/16/2024 1317  Last data filed at 3/16/2024 0436  Gross per 24 hour   Intake 390 ml   Output 1000 ml   Net -610 ml         I/O last 3 completed shifts:  In: 1060 [P.O.:1060]  Out: 1250 [Urine:250]  No intake/output data recorded.   Admission weight: 46.7 kg (103 lb)  Wt Readings from Last 3 Encounters:   03/16/24 46.3 kg (102 lb 1.2 oz)   03/13/24 42.7 kg (94 lb 2.2 oz)   02/22/24 47 kg (103 lb 9.6 oz)        Vitals :   Vitals:    03/16/24 0436 03/16/24 0608 03/16/24 0950 03/16/24 0952   BP:   127/85    Pulse:   88    Resp:  16 16 18   Temp:   98.2 °F (36.8 °C)    TempSrc:   Oral    SpO2:       Weight: 46.3 kg (102 lb 1.2 oz)      Height: 1.524 m (5')          Physical examination  General Appearance: alert and cooperative with exam, appears comfortable, no distress  Neck: No JVD visibly noted  Lungs: no use of accessory muscles  Extremities: No significant LE edema    Medications:  Infusion:    sodium chloride      dextrose       Meds:    traZODone  100 mg Oral Nightly    amLODIPine  5 mg Oral Daily    atorvastatin  10 mg Oral Daily    buPROPion  300 mg Oral Daily    clopidogrel  75 mg Oral Daily    docusate sodium  1 enema Rectal Daily    folic acid  500 mcg Oral Daily    heparin (porcine)  5,000 Units SubCUTAneous BID    montelukast  10 mg Oral Daily    naloxegol  12.5 mg Oral QAM AC    senna  2 tablet Oral Nightly    sevelamer  800 mg Oral Lunch    docusate sodium  100 mg Oral BID    melatonin  6 mg Oral Nightly     Meds

## 2024-03-16 NOTE — PROGRESS NOTES
[facility]  INPATIENT PHYSICAL THERAPY  DAILY NOTE  [facility] - 8K-17/017-A    Time In: 1330  Time Out: 1500  Timed Code Treatment Minutes: 90 Minutes  Minutes: 90          Date: 3/16/2024  Patient Name: Deloris Watts,  Gender:  female        MRN: [id number]  : 1955  (68 y.o.)  Referral Date : 24  Referring Practitioner: Jax Whatley MD  Diagnosis: Closed fracture of neck of left femur, initial encounter  Additional Pertinent Hx: Per H&P:Deloris Watts  is a 68 y.o. right-handed  female with history of hypertension, hyperlipidemia, hydronephrosis, fibromyalgia, irritable bowel syndrome, osteoporosis, end-stage renal disease on hemodialysis (MWF) since 2023, stroke with left side weakness in , anemia, depression, anxiety, right wrist and right ankle fracture treated conservatively, right proximal humeral fracture due to fall requiring ORIF, status post right carpal tunnel release surgery, status post cervical spine surgery, status post hysterectomy and bilateral oophorectomy, hemorrhoidectomy, sinus surgery, tubal ligation, L2 compression fracture requiring kyphoplasty, LASEK surgery, is admitted to the inpatient rehabilitation unit on 3/14/2024 for intensive inpatient rehabilitation treatment impairment and disability secondary to displaced left femur neck fracture as result of fall on 3/10/2024 requiring left hip hemiarthroplasty surgery.The patient says while she was pulling up her pant after using toilet in her home's bathroom on 3/10/2024, she suddenly lost balance and fell to the ground hitting her left hip.  She thinks she also hit her head to something at the time.  She did not loss consciousness.  She immediately feels severe pain at her left hip and was unable to get up even with her 's assistance.  Therefore 911 was called and the patient was sent to [facility] ER by EMS.  Multiple x-ray and CAT  Patient will complete bed < > chair transfer with a RW with modified independence to transfer surface to surface safely.  Long Term Goal 4: Patient will ambulate 50' with a RW with SBA to navigate home.  Long Term Goal 5: Patient will complete car transfer with SBA to transfer in and out of vehicle safely.  Additional Goals?: Yes  Long term goal 6: Patient will ascend/descend 1 step with 1 handrail and CGA for home entry.    Following session, patient left in safe position with all fall risk precautions in place.

## 2024-03-16 NOTE — PROGRESS NOTES
[facility]  Occupational Therapy  Daily Note  Time:   Time In: 930  Time Out: 1030  Timed Code Treatment Minutes: 60 Minutes  Minutes: 60          Date: 3/16/2024  Patient Name: Deloris Watts,   Gender: female      Room: [facility]17/017-A  MRN: [id number]  : 1955  (68 y.o.)  Referring Practitioner: Jax Whatley MD  Diagnosis: Closed fracture of neck of left femur, initial encounter  Additional Pertinent Hx: Per H&P:Deloris Watts  is a 68 y.o. right-handed  female with history of hypertension, hyperlipidemia, hydronephrosis, fibromyalgia, irritable bowel syndrome, osteoporosis, end-stage renal disease on hemodialysis (MWF) since 2023, stroke with left side weakness in , anemia, depression, anxiety, right wrist and right ankle fracture treated conservatively, right proximal humeral fracture due to fall requiring ORIF, status post right carpal tunnel release surgery, status post cervical spine surgery, status post hysterectomy and bilateral oophorectomy, hemorrhoidectomy, sinus surgery, tubal ligation, L2 compression fracture requiring kyphoplasty, LASEK surgery, is admitted to the inpatient rehabilitation unit on 3/14/2024 for intensive inpatient rehabilitation treatment impairment and disability secondary to displaced left femur neck fracture as result of fall on 3/10/2024 requiring left hip hemiarthroplasty surgery.The patient says while she was pulling up her pant after using toilet in her home's bathroom on 3/10/2024, she suddenly lost balance and fell to the ground hitting her left hip.  She thinks she also hit her head to something at the time.  She did not loss consciousness.  She immediately feels severe pain at her left hip and was unable to get up even with her 's assistance.  Therefore 911 was called and the patient was sent to [facility] ER by EMS.  Multiple x-ray and CAT scan were done.  The  assistance.   Education provided regarding stroke rehabilitation management.    ASSESSMENT:     Activity Tolerance:  Patient tolerance of  treatment: Good treatment tolerance      Discharge Recommendations: Continue to assess pending progress  Equipment Recommendations: Other: Continue to assess pending progress  Plan: Times Per Week: 5x/wk for 60 min  Current Treatment Recommendations: Strengthening, Balance training, Functional mobility training, Endurance training, Safety education & training, Self-Care / ADL, Home management training, Patient/Caregiver education & training, Equipment evaluation, education, & procurement, Coordination training    Education:  Learners: Patient  Precautions and Importance of Increasing Activity    Goals  Short Term Goals  Time Frame for Short Term Goals: 1 week  Short Term Goal 1: Pt will complete LB dressing with LHAE and min A to increase indep in home environment while maintaining hip precautions.  Short Term Goal 2: Pt will complete functional standing task x 3 minutes with 1 UE release and CGA to increase indep and endurance with all sinkside grooming and clothing management.  Short Term Goal 3: Pt will navigate to/from BR with CGA and min vcs for safety to increase indep and endurance with all toileting.  Short Term Goal 4: Pt will perform full body bathing with min A and AE PRN to increase indep in home environment.  Short Term Goal 5: Pt will demonstrate 3/3 hip precautions during ADL routine with mod vcs to improve compliance with hip precautions.  Long Term Goals  Time Frame for Long Term Goals : 2 weeks from OT evaluation  Long Term Goal 1: Pt will complete BADL routine with set up assistance to improve indep with self care routine  Long Term Goal 2: Pt will use walker to complete light IADL task with supervision to improve indep within her home.    Following session, patient left in safe position with all fall risk precautions in place.

## 2024-03-16 NOTE — PROGRESS NOTES
[facility]  INPATIENT SPEECH THERAPY  [facility]  DAILY NOTE    TIME   SLP Individual Minutes  Time In: 1109  Time Out: 1139  Minutes: 30  Timed Code Treatment Minutes: 30 Minutes       Date: 3/16/2024  Patient Name: Deloris Watts      CSN: [id number]   : 1955  (68 y.o.)  Gender: female   Referring Physician:  Dr. Jax Whatley  Diagnosis: Closed Fracture of Neck of Left Femur   Precautions: fall risk  Current Diet: Regular solids. Thin liquids.  Respiratory Status: Room Air  Swallowing Strategies: Standard Universal Swallow Precautions  Date of Last MBS/FEES: Not Applicable    Pain:  No pain reported.    Subjective:  Patient seen while resting in chair.  present for duration of session. Patient participatory, however fluctuating levels of alertness throughout session /t drowsiness.     Short-Term Goals:  SHORT TERM GOAL #1:  Goal 1: Patient will complete delayed recall tasks of 3+ units of information (including O-Log) with 75% accuracy, with or without use of compensatory strategies, given mod cues, to improve retention of novel information.  INTERVENTIONS: ST provided patient with handout for WRAP memory strategies - write it down, repeat it, associate it, and picture it. Also explained benefit of alarms and timers. ST explained all listed strategies and demonstrated functional methods for their use in the current and future living environments. ST explained benefits of their use, with goal for potential future independent use to retain new and important learned information. Patient reports use of calendar at home as well as a notepad for taking notes as needed. ST encouraged continued use of home strategies/techniques with potential need for increased strategy use at this time.    Novel Recall of ST information (name, 1 son, expecting in May)  Immediate recall: 1/3 indep, 1/3 min cues, 1/3 mod cues  Immediate recall trial #2: 3/3 indep  ~15 minute  minimal assist (FIM 4) to improve return to least restrictive environment.         Comprehension: 4 - Patient understands basic needs 75-90%+ of the time  Expression: 5 - Expresses basic ideas/needs only (hungry/hot/pain)  Social Interaction: 5 - Patient is appropriate with supervision/cues  Problem Solving: 3 - Patient solves simple/routine tasks 50%-74%  Memory: 2 - Patient remembers 25%-49% of the time    Functional Oral Intake Scale: Total Oral Intake: 7.  Total oral intake with no restrictions    EDUCATION:  Learner: Patient and Significant Other  Education:  Reviewed ST goals and Plan of Care, Reviewed recommendations for follow-up, and Education Related to Potential Risks and Complications Due to Impairment/Illness/Injury  Evaluation of Education: Verbalizes understanding, Demonstrates with assistance, and Needs further instruction    ASSESSMENT/PLAN:  Activity Tolerance:  Patient tolerance of treatment: fair. Drowsy but actively engaged.  Assessment/Plan: Patient progressing toward established goals.  Continues to require skilled care of licensed speech pathologist to progress toward achievement of established goals and plan of care..     Plan for Next Session: functional recall, strategy training/carryover, time management   Discharge Recommendations: Continue to Assess Pending Progress       Christine Rosario M.S., CCC-SLP [id number]

## 2024-03-17 PROCEDURE — 99232 SBSQ HOSP IP/OBS MODERATE 35: CPT | Performed by: INTERNAL MEDICINE

## 2024-03-17 PROCEDURE — 6360000002 HC RX W HCPCS: Performed by: PHYSICAL MEDICINE & REHABILITATION

## 2024-03-17 PROCEDURE — 6370000000 HC RX 637 (ALT 250 FOR IP): Performed by: PHYSICAL MEDICINE & REHABILITATION

## 2024-03-17 PROCEDURE — 1180000000 HC REHAB R&B

## 2024-03-17 RX ADMIN — HYDROCODONE BITARTRATE AND ACETAMINOPHEN 1 TABLET: 5; 325 TABLET ORAL at 21:01

## 2024-03-17 RX ADMIN — HYDROCODONE BITARTRATE AND ACETAMINOPHEN 1 TABLET: 5; 325 TABLET ORAL at 06:32

## 2024-03-17 RX ADMIN — BUPROPION HYDROCHLORIDE 300 MG: 300 TABLET, FILM COATED, EXTENDED RELEASE ORAL at 09:33

## 2024-03-17 RX ADMIN — MONTELUKAST SODIUM 10 MG: 10 TABLET ORAL at 09:33

## 2024-03-17 RX ADMIN — NALOXEGOL OXALATE 12.5 MG: 12.5 TABLET, FILM COATED ORAL at 06:33

## 2024-03-17 RX ADMIN — FOLIC ACID 500 MCG: 1 TABLET ORAL at 09:33

## 2024-03-17 RX ADMIN — SENNOSIDES 17.2 MG: 8.6 TABLET, FILM COATED ORAL at 21:00

## 2024-03-17 RX ADMIN — HYDROCODONE BITARTRATE AND ACETAMINOPHEN 1 TABLET: 5; 325 TABLET ORAL at 01:44

## 2024-03-17 RX ADMIN — Medication 6 MG: at 21:01

## 2024-03-17 RX ADMIN — HEPARIN SODIUM 5000 UNITS: 5000 INJECTION INTRAVENOUS; SUBCUTANEOUS at 21:00

## 2024-03-17 RX ADMIN — HEPARIN SODIUM 5000 UNITS: 5000 INJECTION INTRAVENOUS; SUBCUTANEOUS at 09:34

## 2024-03-17 RX ADMIN — CLOPIDOGREL BISULFATE 75 MG: 75 TABLET ORAL at 09:33

## 2024-03-17 RX ADMIN — DOCUSATE SODIUM 100 MG: 100 CAPSULE, LIQUID FILLED ORAL at 09:33

## 2024-03-17 RX ADMIN — TRAZODONE HYDROCHLORIDE 100 MG: 100 TABLET ORAL at 21:01

## 2024-03-17 RX ADMIN — ATORVASTATIN CALCIUM 10 MG: 10 TABLET, FILM COATED ORAL at 09:33

## 2024-03-17 RX ADMIN — SEVELAMER CARBONATE 800 MG: 800 TABLET, FILM COATED ORAL at 11:58

## 2024-03-17 RX ADMIN — AMLODIPINE BESYLATE 5 MG: 5 TABLET ORAL at 09:33

## 2024-03-17 RX ADMIN — DOCUSATE SODIUM 100 MG: 100 CAPSULE, LIQUID FILLED ORAL at 21:01

## 2024-03-17 RX ADMIN — POLYETHYLENE GLYCOL 3350 17 G: 17 POWDER, FOR SOLUTION ORAL at 09:34

## 2024-03-17 RX ADMIN — HYDROCODONE BITARTRATE AND ACETAMINOPHEN 2 TABLET: 5; 325 TABLET ORAL at 09:33

## 2024-03-17 ASSESSMENT — PAIN - FUNCTIONAL ASSESSMENT
PAIN_FUNCTIONAL_ASSESSMENT: ACTIVITIES ARE NOT PREVENTED

## 2024-03-17 ASSESSMENT — PAIN SCALES - GENERAL
PAINLEVEL_OUTOF10: 6
PAINLEVEL_OUTOF10: 5
PAINLEVEL_OUTOF10: 5
PAINLEVEL_OUTOF10: 6
PAINLEVEL_OUTOF10: 8

## 2024-03-17 ASSESSMENT — PAIN DESCRIPTION - LOCATION
LOCATION: HIP
LOCATION: LEG;HIP
LOCATION: HIP

## 2024-03-17 ASSESSMENT — PAIN DESCRIPTION - ORIENTATION
ORIENTATION: LEFT

## 2024-03-17 ASSESSMENT — PAIN DESCRIPTION - DESCRIPTORS
DESCRIPTORS: ACHING
DESCRIPTORS: ACHING
DESCRIPTORS: ACHING;SORE;SHARP
DESCRIPTORS: ACHING
DESCRIPTORS: ACHING

## 2024-03-17 ASSESSMENT — PAIN DESCRIPTION - PAIN TYPE: TYPE: SURGICAL PAIN

## 2024-03-17 ASSESSMENT — PAIN DESCRIPTION - FREQUENCY: FREQUENCY: INTERMITTENT

## 2024-03-17 ASSESSMENT — PAIN DESCRIPTION - ONSET: ONSET: ON-GOING

## 2024-03-17 NOTE — PROGRESS NOTES
Physical Medicine & Rehabilitation Progress Note    Chief Complaint:  Left hip pain due to left hip fracture requiring left hip hemiarthroplasty surgery    Subjective:    Deloris Watts is a 68 y.o. right-handed  female with history of hypertension, hyperlipidemia, hydronephrosis, fibromyalgia, irritable bowel syndrome, osteoporosis, end-stage renal disease on hemodialysis (MWF) since January 2023, stroke with left side weakness in 1990s, anemia, depression, anxiety, right wrist and right ankle fracture treated conservatively, right proximal humeral fracture due to fall requiring ORIF, status post right carpal tunnel release surgery, status post cervical spine surgery, status post hysterectomy and bilateral oophorectomy, hemorrhoidectomy, sinus surgery, tubal ligation, L2 compression fracture requiring kyphoplasty, LASEK surgery, was admitted on 3/14/2024 for intensive inpatient management of impairment & disability secondary to displaced left femur neck fracture as result of fall on 3/10/2024 requiring left hip hemiarthroplasty surgery.     The patient was previously in inpatient rehab service from 12/21/2023 to 1/5/2024 for intensive inpatient management of impairment & disability secondary to fall accident on 12/14/2023 resulting L2 compression fracture requiring kyphoplasty, and liver laceration.  She was discharged to home on 1/5/2024 with referral for outpatient PT, OT and speech therapy.     The patient says while she was pulling up her pant after using toilet in her home's bathroom on 3/10/2024, she suddenly lost balance and fell to the ground hitting her left hip.  She thinks she also hit her head to something at the time.  She did not loss consciousness.  She immediately feels severe pain at her left hip and was unable to get up even with her 's assistance.  Therefore 911 was called and the patient was sent to [facility] ER by EMS.  Multiple x-ray and CAT scan were done.

## 2024-03-17 NOTE — PLAN OF CARE
Problem: Discharge Planning  Goal: Discharge to home or other facility with appropriate resources  Outcome: Progressing  Flowsheets (Taken 3/17/2024 0523)  Discharge to home or other facility with appropriate resources:   Identify barriers to discharge with patient and caregiver   Identify discharge learning needs (meds, wound care, etc)     Problem: Pain  Goal: Verbalizes/displays adequate comfort level or baseline comfort level  Outcome: Progressing  Flowsheets (Taken 3/17/2024 0523)  Verbalizes/displays adequate comfort level or baseline comfort level:   Encourage patient to monitor pain and request assistance   Assess pain using appropriate pain scale   Administer analgesics based on type and severity of pain and evaluate response   Implement non-pharmacological measures as appropriate and evaluate response     Problem: Skin/Tissue Integrity  Goal: Absence of new skin breakdown  Description: 1.  Monitor for areas of redness and/or skin breakdown  2.  Assess vascular access sites hourly  3.  Every 4-6 hours minimum:  Change oxygen saturation probe site  4.  Every 4-6 hours:  If on nasal continuous positive airway pressure, respiratory therapy assess nares and determine need for appliance change or resting period.  Outcome: Progressing     Problem: ABCDS Injury Assessment  Goal: Absence of physical injury  Outcome: Progressing  Flowsheets (Taken 3/17/2024 0523)  Absence of Physical Injury: Implement safety measures based on patient assessment     Problem: Chronic Conditions and Co-morbidities  Goal: Patient's chronic conditions and co-morbidity symptoms are monitored and maintained or improved  Outcome: Progressing

## 2024-03-17 NOTE — PROGRESS NOTES
[facility]   Nephrology progress note  3/17/2024, 12:49 PM      Pt Name:    Deloris Watts  MRN:     [id number]     YOB: 1955  Admit Date:    3/14/2024  5:39 PM    Chief Complaint: Nephrology following for ESRD and HD    Subjective:  Patient was seen and examined this morning  No chest pain or shortness of breath  Feels okay      Objective:  24HR INTAKE/OUTPUT:    Intake/Output Summary (Last 24 hours) at 3/17/2024 1249  Last data filed at 3/16/2024 1730  Gross per 24 hour   Intake 240 ml   Output --   Net 240 ml           I/O last 3 completed shifts:  In: 710 [P.O.:710]  Out: -   No intake/output data recorded.   Admission weight: 46.7 kg (103 lb)  Wt Readings from Last 3 Encounters:   03/17/24 47.4 kg (104 lb 8 oz)   03/13/24 42.7 kg (94 lb 2.2 oz)   02/22/24 47 kg (103 lb 9.6 oz)        Vitals :   Vitals:    03/17/24 0632 03/17/24 0859 03/17/24 0933 03/17/24 1059   BP:  (!) 158/73  (!) 151/68   Pulse:  87  84   Resp: 16 18 16    Temp:  97.7 °F (36.5 °C)     TempSrc:       SpO2:  100%     Weight:       Height:           Physical examination  General Appearance: alert and cooperative with exam, appears comfortable, no distress  Neck: No JVD visibly noted  Lungs: no use of accessory muscles  Extremities: No significant LE edema    Medications:  Infusion:    sodium chloride      dextrose       Meds:    traZODone  100 mg Oral Nightly    amLODIPine  5 mg Oral Daily    atorvastatin  10 mg Oral Daily    buPROPion  300 mg Oral Daily    clopidogrel  75 mg Oral Daily    docusate sodium  1 enema Rectal Daily    folic acid  500 mcg Oral Daily    heparin (porcine)  5,000 Units SubCUTAneous BID    montelukast  10 mg Oral Daily    naloxegol  12.5 mg Oral QAM AC    senna  2 tablet Oral Nightly    sevelamer  800 mg Oral Lunch    docusate sodium  100 mg Oral BID    melatonin  6 mg Oral Nightly     Meds prn: bisacodyl, sodium chloride, dextrose, dextrose bolus **OR** dextrose bolus, docusate  sodium, glucagon (rDNA), glucose, HYDROcodone 5 mg - acetaminophen **OR** HYDROcodone 5 mg - acetaminophen, linaclotide, magnesium hydroxide, ondansetron, polyethylene glycol, sodium chloride flush, acetaminophen, diclofenac sodium     Lab Data :  CBC:   Recent Labs     03/15/24  0707   WBC 8.7   HGB 9.4*   HCT 30.1*          CMP:  Recent Labs     03/15/24  0707      K 4.5   CL 97*   CO2 20*   BUN 54*   CREATININE 5.6*   GLUCOSE 89   CALCIUM 9.2       Hepatic:   Recent Labs     03/15/24  0707   LABALBU 3.0*   AST 15   ALT 11   BILITOT 0.2*   ALKPHOS 157*           Assessment and Plan:  ESRD on Mondays, Wednesdays and Fridays  Continue with maintenance dialysis 3 times weekly  No acute need for RRT today  Check labs in a.m.  Anemia in ESRD.  Hemoglobin stable but will repeat labs in a.m.  Metabolic acidosis.    Hypertension  Leukocytosis.  Improved  Left femur fracture status post arthroplasty      Vasiliy Barnard MD  [facility]    This report has been created using voice recognition software. It may contain minor errors which are inherent in voice recognition technology

## 2024-03-18 LAB
ANION GAP SERPL CALC-SCNC: 17 MEQ/L (ref 8–16)
BUN SERPL-MCNC: 59 MG/DL (ref 7–22)
CALCIUM SERPL-MCNC: 9.3 MG/DL (ref 8.5–10.5)
CHLORIDE SERPL-SCNC: 100 MEQ/L (ref 98–111)
CO2 SERPL-SCNC: 22 MEQ/L (ref 23–33)
CREAT SERPL-MCNC: 6 MG/DL (ref 0.4–1.2)
DEPRECATED RDW RBC AUTO: 50 FL (ref 35–45)
ERYTHROCYTE [DISTWIDTH] IN BLOOD BY AUTOMATED COUNT: 13.2 % (ref 11.5–14.5)
GFR SERPL CREATININE-BSD FRML MDRD: 7 ML/MIN/1.73M2
GLUCOSE SERPL-MCNC: 64 MG/DL (ref 70–108)
HCT VFR BLD AUTO: 30 % (ref 37–47)
HGB BLD-MCNC: 9.4 GM/DL (ref 12–16)
MCH RBC QN AUTO: 32.3 PG (ref 26–33)
MCHC RBC AUTO-ENTMCNC: 31.3 GM/DL (ref 32.2–35.5)
MCV RBC AUTO: 103.1 FL (ref 81–99)
PLATELET # BLD AUTO: 344 THOU/MM3 (ref 130–400)
PMV BLD AUTO: 9.9 FL (ref 9.4–12.4)
POTASSIUM SERPL-SCNC: 4.4 MEQ/L (ref 3.5–5.2)
RBC # BLD AUTO: 2.91 MILL/MM3 (ref 4.2–5.4)
SODIUM SERPL-SCNC: 139 MEQ/L (ref 135–145)
WBC # BLD AUTO: 9.3 THOU/MM3 (ref 4.8–10.8)

## 2024-03-18 PROCEDURE — 99232 SBSQ HOSP IP/OBS MODERATE 35: CPT | Performed by: PHYSICAL MEDICINE & REHABILITATION

## 2024-03-18 PROCEDURE — 97535 SELF CARE MNGMENT TRAINING: CPT

## 2024-03-18 PROCEDURE — 80048 BASIC METABOLIC PNL TOTAL CA: CPT

## 2024-03-18 PROCEDURE — 97530 THERAPEUTIC ACTIVITIES: CPT

## 2024-03-18 PROCEDURE — 97112 NEUROMUSCULAR REEDUCATION: CPT

## 2024-03-18 PROCEDURE — 97129 THER IVNTJ 1ST 15 MIN: CPT

## 2024-03-18 PROCEDURE — 36415 COLL VENOUS BLD VENIPUNCTURE: CPT

## 2024-03-18 PROCEDURE — 6370000000 HC RX 637 (ALT 250 FOR IP): Performed by: PHYSICAL MEDICINE & REHABILITATION

## 2024-03-18 PROCEDURE — 85027 COMPLETE CBC AUTOMATED: CPT

## 2024-03-18 PROCEDURE — 97116 GAIT TRAINING THERAPY: CPT

## 2024-03-18 PROCEDURE — 1180000000 HC REHAB R&B

## 2024-03-18 PROCEDURE — 6360000002 HC RX W HCPCS: Performed by: PHYSICAL MEDICINE & REHABILITATION

## 2024-03-18 PROCEDURE — 97110 THERAPEUTIC EXERCISES: CPT

## 2024-03-18 PROCEDURE — 99232 SBSQ HOSP IP/OBS MODERATE 35: CPT | Performed by: INTERNAL MEDICINE

## 2024-03-18 PROCEDURE — 97130 THER IVNTJ EA ADDL 15 MIN: CPT

## 2024-03-18 PROCEDURE — 90935 HEMODIALYSIS ONE EVALUATION: CPT

## 2024-03-18 RX ADMIN — FOLIC ACID 500 MCG: 1 TABLET ORAL at 09:13

## 2024-03-18 RX ADMIN — DOCUSATE SODIUM 283 MG: 283 LIQUID RECTAL at 19:07

## 2024-03-18 RX ADMIN — BUPROPION HYDROCHLORIDE 300 MG: 300 TABLET, FILM COATED, EXTENDED RELEASE ORAL at 09:13

## 2024-03-18 RX ADMIN — AMLODIPINE BESYLATE 5 MG: 5 TABLET ORAL at 09:13

## 2024-03-18 RX ADMIN — ACETAMINOPHEN 650 MG: 325 TABLET ORAL at 16:03

## 2024-03-18 RX ADMIN — Medication 6 MG: at 21:48

## 2024-03-18 RX ADMIN — HEPARIN SODIUM 5000 UNITS: 5000 INJECTION INTRAVENOUS; SUBCUTANEOUS at 09:12

## 2024-03-18 RX ADMIN — DOCUSATE SODIUM 100 MG: 100 CAPSULE, LIQUID FILLED ORAL at 21:48

## 2024-03-18 RX ADMIN — ONDANSETRON 4 MG: 4 TABLET, ORALLY DISINTEGRATING ORAL at 23:13

## 2024-03-18 RX ADMIN — NALOXEGOL OXALATE 12.5 MG: 12.5 TABLET, FILM COATED ORAL at 06:09

## 2024-03-18 RX ADMIN — ACETAMINOPHEN 650 MG: 325 TABLET ORAL at 21:46

## 2024-03-18 RX ADMIN — HYDROCODONE BITARTRATE AND ACETAMINOPHEN 2 TABLET: 5; 325 TABLET ORAL at 19:14

## 2024-03-18 RX ADMIN — ATORVASTATIN CALCIUM 10 MG: 10 TABLET, FILM COATED ORAL at 09:13

## 2024-03-18 RX ADMIN — MONTELUKAST SODIUM 10 MG: 10 TABLET ORAL at 09:13

## 2024-03-18 RX ADMIN — DOCUSATE SODIUM 100 MG: 100 CAPSULE, LIQUID FILLED ORAL at 09:13

## 2024-03-18 RX ADMIN — SENNOSIDES 17.2 MG: 8.6 TABLET, FILM COATED ORAL at 21:46

## 2024-03-18 RX ADMIN — TRAZODONE HYDROCHLORIDE 100 MG: 100 TABLET ORAL at 21:48

## 2024-03-18 RX ADMIN — CLOPIDOGREL BISULFATE 75 MG: 75 TABLET ORAL at 09:13

## 2024-03-18 RX ADMIN — HEPARIN SODIUM 5000 UNITS: 5000 INJECTION INTRAVENOUS; SUBCUTANEOUS at 21:45

## 2024-03-18 ASSESSMENT — PAIN DESCRIPTION - LOCATION
LOCATION: HIP
LOCATION: HIP
LOCATION: HIP;LEG

## 2024-03-18 ASSESSMENT — PAIN SCALES - GENERAL
PAINLEVEL_OUTOF10: 8
PAINLEVEL_OUTOF10: 0
PAINLEVEL_OUTOF10: 0
PAINLEVEL_OUTOF10: 10
PAINLEVEL_OUTOF10: 10
PAINLEVEL_OUTOF10: 4

## 2024-03-18 ASSESSMENT — PAIN DESCRIPTION - DESCRIPTORS
DESCRIPTORS: THROBBING
DESCRIPTORS: SHARP
DESCRIPTORS: ACHING;PENETRATING;DISCOMFORT

## 2024-03-18 ASSESSMENT — PAIN - FUNCTIONAL ASSESSMENT
PAIN_FUNCTIONAL_ASSESSMENT: ACTIVITIES ARE NOT PREVENTED
PAIN_FUNCTIONAL_ASSESSMENT: PREVENTS OR INTERFERES SOME ACTIVE ACTIVITIES AND ADLS

## 2024-03-18 ASSESSMENT — PAIN DESCRIPTION - ORIENTATION
ORIENTATION: LEFT

## 2024-03-18 NOTE — PROGRESS NOTES
[facility]  Inpatient Rehabilitation  Occupational Therapy  Progress Note  Time:  Time In: 930  Time Out: 1030  Timed Code Treatment Minutes: 60 Minutes  Minutes: 60          Date: 3/18/2024  Patient Name: Deloris Watts,   Gender: female      Room: [facility]17/017-A  MRN: [id number]  : 1955  (68 y.o.)  Referring Practitioner: Jax Whatley MD  Diagnosis: Closed fracture of neck of left femur, initial encounter  Additional Pertinent Hx: Per H&P:Deloris Watts  is a 68 y.o. right-handed  female with history of hypertension, hyperlipidemia, hydronephrosis, fibromyalgia, irritable bowel syndrome, osteoporosis, end-stage renal disease on hemodialysis (MWF) since 2023, stroke with left side weakness in , anemia, depression, anxiety, right wrist and right ankle fracture treated conservatively, right proximal humeral fracture due to fall requiring ORIF, status post right carpal tunnel release surgery, status post cervical spine surgery, status post hysterectomy and bilateral oophorectomy, hemorrhoidectomy, sinus surgery, tubal ligation, L2 compression fracture requiring kyphoplasty, LASEK surgery, is admitted to the inpatient rehabilitation unit on 3/14/2024 for intensive inpatient rehabilitation treatment impairment and disability secondary to displaced left femur neck fracture as result of fall on 3/10/2024 requiring left hip hemiarthroplasty surgery.The patient says while she was pulling up her pant after using toilet in her home's bathroom on 3/10/2024, she suddenly lost balance and fell to the ground hitting her left hip.  She thinks she also hit her head to something at the time.  She did not loss consciousness.  She immediately feels severe pain at her left hip and was unable to get up even with her 's assistance.  Therefore 911 was called and the patient was sent to [facility] ER by EMS.  Multiple x-ray and CAT scan were done.  The patient was found  precautions during ADL routine with min vcs to improve compliance with hip precautions.  Long Term Goals  Time Frame for Long Term Goals : 2 weeks from OT evaluation  Long Term Goal 1: Pt will complete BADL routine with set up assistance to improve indep with self care routine  Long Term Goal 2: Pt will use walker to complete light IADL task with supervision to improve indep within her home.      Following session, patient left in safe position with all fall risk precautions in place.

## 2024-03-18 NOTE — FLOWSHEET NOTE
03/18/24 1310 03/18/24 1605   Vital Signs   Temp 97.3 °F (36.3 °C) 97.3 °F (36.3 °C)   Pulse 83 87   Respirations 21 22   Weight - Scale 42.1 kg (92 lb 13 oz) 42.8 kg (94 lb 5.7 oz)   Weight Method Bed scale Bed scale   Percent Weight Change -11.44 1.66   Post-Hemodialysis Assessment   Post-Treatment Procedures  --  Blood returned;Catheter Capped, clamped with Saline x2 ports   Machine Disinfection Process  --  Acid/Vinegar Clean;Heat Disinfect;Exterior Machine Disinfection   Blood Volume Processed (Liters)  --  56.2 L   Dialyzer Clearance  --  Lightly streaked   Duration of Treatment (minutes)  --  150 minutes   Heparin Amount Administered During Treatment (mL)  --  0 mL   Hemodialysis Intake (ml)  --  600 ml   Hemodialysis Output (ml)  --  727 ml   NET Removed (ml)  --  127     2.5 hour treatment completed. No fluid removed, attempted to remove 500 ml of fluid but patient cramping, UF turned off at that time. Tylenol given. Cath dressing changed. Lines flushed with 0.9 NS, clamped and tego secured. Report given to primary RN. Charting printed and placed in bin to be scanned into EMR.

## 2024-03-18 NOTE — PROGRESS NOTES
[facility]  INPATIENT PHYSICAL THERAPY  [facility] - 8K-17/017-A  Progress Note    Time In: 0700  Time Out: 0830  Timed Code Treatment Minutes: 90 Minutes  Minutes: 90          Date: 3/18/2024  Patient Name: Deloris Watts,  Gender:  female        MRN: [id number]  : 1955  (68 y.o.)  Referral Date : 24  Referring Practitioner: Jax Whatley MD  Diagnosis: Closed fracture of neck of left femur, initial encounter  Additional Pertinent Hx: Per H&P:Deloris Watts  is a 68 y.o. right-handed  female with history of hypertension, hyperlipidemia, hydronephrosis, fibromyalgia, irritable bowel syndrome, osteoporosis, end-stage renal disease on hemodialysis (MWF) since 2023, stroke with left side weakness in , anemia, depression, anxiety, right wrist and right ankle fracture treated conservatively, right proximal humeral fracture due to fall requiring ORIF, status post right carpal tunnel release surgery, status post cervical spine surgery, status post hysterectomy and bilateral oophorectomy, hemorrhoidectomy, sinus surgery, tubal ligation, L2 compression fracture requiring kyphoplasty, LASEK surgery, is admitted to the inpatient rehabilitation unit on 3/14/2024 for intensive inpatient rehabilitation treatment impairment and disability secondary to displaced left femur neck fracture as result of fall on 3/10/2024 requiring left hip hemiarthroplasty surgery.The patient says while she was pulling up her pant after using toilet in her home's bathroom on 3/10/2024, she suddenly lost balance and fell to the ground hitting her left hip.  She thinks she also hit her head to something at the time.  She did not loss consciousness.  She immediately feels severe pain at her left hip and was unable to get up even with her 's assistance.  Therefore 911 was called and the patient was sent to [facility] ER by EMS.  Multiple x-ray and CAT  step and stride length. Patient demonstrates decreased dynamic standing balance resulting in increased risk for falls. Patient continues to demonstrate decreased LLE strength with increased complaints of pain limiting her functional mobility. Patient overall can continue to benefit from skilled PT treatment in order to assist with BLE strengthening, gait training, transfer training, bed mobility, core strengthening and dynamic balance for increased functional mobility.   Activity Tolerance:  Patient tolerance of  treatment: fair.  Limited due to pain  Equipment Recommendations:Equipment Needed: No  Discharge Recommendations: Continue to assess pending progress, Patient would benefit from continued therapy after discharge     Plan: Current Treatment Recommendations: Strengthening, Balance training, Functional mobility training, Transfer training, Endurance training, Neuromuscular re-education, Gait training, Stair training, Home exercise program, Patient/Caregiver education & training, Safety education & training, Therapeutic activities, Equipment evaluation, education, & procurement, Wheelchair mobility training  General Plan:  (5x/wk 90min)  Patient Education  Patient Education: Functional Mobility, Precautions/Restrictions, Bed Mobility, Transfers, Gait, Stairs, Car Transfers,  - Patient Verbalized Understanding, - Patient Requires Continued Education    Goals:  Patient Goals : go home and decrease pain    Short Term Goals  Time Frame for Short Term Goals: 1 week  Short Term Goal 1: Patient will complete supine < > sit with head of bed elevated ~10 degrees, no rail and min assist to transfer in and out of bed with decreased difficulty. GOAL NOT MET  Short Term Goal 2: Patient will complete sit  <> stand with CGA to stand to ambulate with decrease difficulty. GOAL MET, SEE LTG  Short Term Goal 3: Patient will ambulate 20' with a RW and CGA to progress towards navigating home safely. GOAL MET, SEE LTG  Short Term

## 2024-03-18 NOTE — PROGRESS NOTES
BUN 27 (H) 7 - 22 mg/dL    Creatinine 3.8 (HH) 0.4 - 1.2 mg/dL    Calcium 8.7 8.5 - 10.5 mg/dL   CBC with Auto Differential    Collection Time: 03/19/24  6:15 AM   Result Value Ref Range    WBC 10.2 4.8 - 10.8 thou/mm3    RBC 2.81 (L) 4.20 - 5.40 mill/mm3    Hemoglobin 9.0 (L) 12.0 - 16.0 gm/dl    Hematocrit 28.5 (L) 37.0 - 47.0 %    .4 (H) 81.0 - 99.0 fL    MCH 32.0 26.0 - 33.0 pg    MCHC 31.6 (L) 32.2 - 35.5 gm/dl    RDW-CV 13.2 11.5 - 14.5 %    RDW-SD 49.9 (H) 35.0 - 45.0 fL    Platelets 357 130 - 400 thou/mm3    MPV 9.6 9.4 - 12.4 fL    Pathologist Review SMW     Seg Neutrophils 49.0 %    Lymphocytes 25.1 %    Monocytes 10.8 %    Eosinophils 5.2 %    Basophils 0.6 %    Immature Granulocytes 9.3 %    Segs Absolute 5.0 1.8 - 7.7 thou/mm3    Lymphocytes Absolute 2.6 1.0 - 4.8 thou/mm3    Monocytes Absolute 1.1 0.4 - 1.3 thou/mm3    Eosinophils Absolute 0.5 (H) 0.0 - 0.4 thou/mm3    Basophils Absolute 0.1 0.0 - 0.1 thou/mm3    Immature Grans (Abs) 0.94 (H) 0.00 - 0.07 thou/mm3    nRBC 0 /100 wbc   Anion Gap    Collection Time: 03/19/24  6:15 AM   Result Value Ref Range    Anion Gap 17.0 (H) 8.0 - 16.0 meq/L   Glomerular Filtration Rate, Estimated    Collection Time: 03/19/24  6:15 AM   Result Value Ref Range    Est, Glom Filt Rate 12 (A) >60 ml/min/1.73m2       Impression:  Status post fall on 3/10/2024 in the bathroom resulting  Displaced left femur neck fracture requiring left hip hemiarthroplasty surgery  Head scalp contusion with cerebral concussion without loss of consciousness  Cervical spine sprain and muscular strain  History of stroke with left hemiparesis  End-stage renal disease requiring hemodialysis  History of fall resulting liver laceration and acute L2 compression fracture requiring kyphoplasty  Hypertension  Irritable bowel syndrome  Hyperlipidemia  Osteoporosis  Allergic rhinitis  Vomiting episode possibly due to GERD  History of hydronephrosis  History of fibromyalgia  History of depression  GERD  Continue melatonin, trazodone for insomnia  Continue hemodialysis Monday, Wednesday and Friday under the direction of nephrology service  Nutrition: Continue current regular diet  Bladder: Monitoring signs or symptoms of UTI  Bowel: Monitoring signs or symptoms of constipation   and case management for coordination of care and discharge planning        Missed Therapy Time:  None      ANTON DAVIS MD     This report has been created using voice recognition software. It may contain minor errors which are inherent in voice recognition technology

## 2024-03-18 NOTE — PROGRESS NOTES
than 22) age over 65, renal   diet restrictions, CHF, left femur fracture    Clinical Indicators: pt endorses decreased appetite, has had ongoing weight   fluctuations r/t HD  Nutrition Assessment:  3/12  Pt. moderately malnourished AEB criteria as:  Energy Intake:  75% or less estimated energy requirements for 1 month or   longer  Weight Loss:  Unable to assess (pt ESRD on HD - fluctuates frequently)  Body Fat Loss:  Mild body fat loss Orbital, Triceps, Fat Overlying Ribs  Muscle Mass Loss:   (moderate losses) Temples (temporalis), Clavicles   (pectoralis & deltoids), Scapula (trapezius)  Fluid Accumulation:  Mild Extremities  Treatment: Start ONS: Ensure compact (BID) , Recommend renal MVI - [place]   Plus, renal diet education per RD consult,    ASPEN Criteria:    https://aspenjournals.onlinelibrary.robbins.com/doi/full/10.1177/743419400276973  5  Options provided:  -- Mild Malnutrition  -- Moderate Malnutrition  -- Mild Protein calorie malnutrition  -- Moderate Protein calorie malnutrition  -- Other - I will add my own diagnosis  -- Disagree - Not applicable / Not valid  -- Disagree - Clinically unable to determine / Unknown  -- Refer to Clinical Documentation Reviewer    PROVIDER RESPONSE TEXT:    This patient has mild malnutrition.    Query created by: Ute Malone on 3/12/2024 12:13 PM      Electronically signed by:  Mikayla Doll PA-C 3/18/2024 1:58 PM

## 2024-03-18 NOTE — PLAN OF CARE
Problem: Discharge Planning  Goal: Discharge to home or other facility with appropriate resources  3/18/2024 1334 by Tipton, Rylee, LPN  Outcome: Progressing  Flowsheets (Taken 3/18/2024 0918)  Discharge to home or other facility with appropriate resources:   Identify barriers to discharge with patient and caregiver   Identify discharge learning needs (meds, wound care, etc)     Problem: Chronic Conditions and Co-morbidities  Goal: Patient's chronic conditions and co-morbidity symptoms are monitored and maintained or improved  3/18/2024 1334 by Tipton, Rylee, LPN  Outcome: Progressing  Flowsheets (Taken 3/18/2024 0918)  Care Plan - Patient's Chronic Conditions and Co-Morbidity Symptoms are Monitored and Maintained or Improved:   Monitor and assess patient's chronic conditions and comorbid symptoms for stability, deterioration, or improvement   Collaborate with multidisciplinary team to address chronic and comorbid conditions and prevent exacerbation or deterioration     Problem: Safety - Adult  Goal: Free from fall injury  3/18/2024 1334 by Tipton, Rylee, LPN  Outcome: Progressing  Flowsheets (Taken 3/18/2024 1334)  Free From Fall Injury: Instruct family/caregiver on patient safety

## 2024-03-18 NOTE — PROGRESS NOTES
[facility]  INPATIENT SPEECH THERAPY  [facility]  PROGRESS NOTE    TIME   SLP Individual Minutes  Time In: 0830  Time Out: 0900  Minutes: 30  Timed Code Treatment Minutes: 30 Minutes       Date: 3/18/2024  Patient Name: Deloris Watts      CSN: [id number]   : 1955  (68 y.o.)  Gender: female   Referring Physician:  Dr. Jax Whatley  Diagnosis: Closed Fracture of Neck of Left Femur   Precautions: fall risk  Current Diet: Regular solids. Thin liquids.  Respiratory Status: Room Air  Swallowing Strategies: Standard Universal Swallow Precautions  Date of Last MBS/FEES: Not Applicable    Pain:  6/10 - Pain location: Femur, intermittent shooting pain    Subjective:  Patient resting in bed upon ST arrival. Patient participatory and pleasant. No family present.     Short-Term Goals:  SHORT TERM GOAL #1:  Goal 1: Patient will complete delayed recall tasks of 3+ units of information (including O-Log) with 75% accuracy, with or without use of compensatory strategies, given mod cues, to improve retention of novel information. GOAL NOT MET.   Revised Goal 1: Patient will complete delayed recall tasks of 3+ units of information (including Cog-Log) with 75% accuracy, with or without use of compensatory strategies, given mod cues, to improve retention of novel information.  INTERVENTIONS:   Recall of ST information (name, 1 son, expecting in May)  ~2 Day recall: 3/3 max cues  ~25 minute delayed recall: 2/3 min cues, 1/3 mod cue    Cog-Lo/30  *Deficits: date, months reversed, 30 seconds, fist-edge-palm, go/no-go, delayed address recall    PREVIOUS SESSION:  ST provided patient with handout for WRAP memory strategies - write it down, repeat it, associate it, and picture it. Also explained benefit of alarms and timers. ST explained all listed strategies and demonstrated functional methods for their use in the current and future living environments. ST explained benefits of their

## 2024-03-18 NOTE — PROGRESS NOTES
[facility]  INPATIENT REHABILITATION  TEAM CONFERENCE NOTE    Conference Date: 3/19/2024  Admit Date:  3/14/2024  5:39 PM  Patient Name: Deloris Watts    MRN: [id number]    : 1955  (68 y.o.)  Rehabilitation Admitting Diagnosis:  Closed fracture of neck of left femur, initial encounter ([facility]) [S72.002A]  Referring Practitioner: Jax Whatley MD      CASE MANAGEMENT  Current issues/needs regarding patient and family discharge status: Prior to admission, patient was living with her , Tenzin. Patient reported being modified independent at home with her walker. Patient was able to complete ambulation, ADLs and transfers. Tenzin was managing the housekeeping, meal prep, errands, finances and driving. Patient reports that Tenzin is supportive of patient and helping her with patient what she needs. Patient is retired. Patient's support includes Tenzin and patient's sister, Nicole. Nicole lives locally. Patient's family physician is Johana Weldon MD. Patient prefers [facility]. Patient is motivated to participate in therapy.     PHYSICAL THERAPY  Patient overall is making progress with skilled PT treatment and has met 3/5 STG's and 0/6 LTG's. Patient requires moderate to maximal assistance for bed mobility supine<>sit, CGA for sit<>stand transfers with use of RW with cueing for hand placement and decreased tolerance to weight through LLE. Patient is able to ambulate 26 feet with use of RW and antalgic gait pattern with decreased step and stride length. Patient demonstrates decreased dynamic standing balance resulting in increased risk for falls. Patient continues to demonstrate decreased LLE strength with increased complaints of pain limiting her functional mobility. Patient overall can continue to benefit from skilled PT treatment in order to assist with BLE strengthening, gait training, transfer training, bed mobility, core strengthening and dynamic balance for increased functional  Bladder: No.   Pain is Managed:  Yes.  Management: Tylenol, Norco.  Frequency of Intervention: PRN.  Adequately Controlled: Yes  Sleep: Adequate  Signs and Symptoms of Infection:  No.   Signs and Symptoms of Skin Breakdown:  No.   Injury and/or Falls during Inpatient Rehabilitation Admission: No  Anticoagulants: Plavix, Heparin  Diabetic: No  Consultations/Labs/X-rays: Nephrology, Psychology  Oxygen while on IP Rehab:  No Currently using  0 liters per 0  .  Home oxygen: No  Patient/Family Education Focus: Fall prevention,Dialysis     Barriers to Education: Cognition    No results for input(s): \"POCGLU\" in the last 72 hours.    Lab Results   Component Value Date    LDLCALC 45 03/15/2024    LDLDIRECT 73 01/18/2017         Vitals:    03/18/24 1648 03/18/24 1914 03/18/24 2054 03/19/24 0527   BP: (!) 140/67  137/61    Pulse:   87    Resp:  18 16 18   Temp:   98.6 °F (37 °C)    TempSrc:   Oral    SpO2:   98%    Weight:       Height:              Family Education: Family available and participating in education   Fall Risk:  Falling star program initiated  Is the patient appropriate for a stay in the functional apartment? no    Discharge Plan   Estimated Discharge Date:  3/30/2024    Destination: outpatient or home health and discharge home with supervision  Services at Discharge: Other to be determined  Is patient appropriate for an outpatient driving evaluation? No driving  Equipment at Discharge: Other: Pt has a shower chair for her tub/shower- monitor for tub transfer bench and elevated toilet seat  Factors facilitating achievement of predicted outcomes: Family support, Motivated, and Cooperative  Barriers to the achievement of predicted outcomes: Pain, Limited family support, Cognitive deficit, anxiety, Impulsivity, Limited safety awareness, Limited insight into deficits, Unrealistic expectations, Decreased endurance, lower extremity weakness, Stairs at home, wound care, Skin Care, and hemodialysis  Follow up with

## 2024-03-18 NOTE — PROGRESS NOTES
Patient: Deloris Watts  Unit/Bed: 8K-17/017-A  YOB: 1955  MRN: [id number] Acct: [id number]   Admitting Diagnosis: Closed fracture of neck of left femur, initial encounter ([facility]) [S72.002A]  Admit Date:  3/14/2024  Hospital Day: 4    Assessment:     Principal Problem:    Closed fracture of neck of left femur, initial encounter ([facility])  Active Problems:    Depression    Anxiety disorder    History of CVA (cerebrovascular accident)    Hyperlipidemia    Allergic rhinitis    Essential hypertension    Generalized anxiety disorder    ESRD on hemodialysis ([facility])    Chronic constipation  Resolved Problems:    * No resolved hospital problems. *      Plan:     I discussed the muscle cramping of the left leg with Dr Whatley.        Subjective:     Patient has no complaint of CP or SOB but states that there has been some muscle cramping in the left leg today..   Medication side effects: none    Scheduled Meds:   traZODone  100 mg Oral Nightly    amLODIPine  5 mg Oral Daily    atorvastatin  10 mg Oral Daily    buPROPion  300 mg Oral Daily    clopidogrel  75 mg Oral Daily    docusate sodium  1 enema Rectal Daily    folic acid  500 mcg Oral Daily    heparin (porcine)  5,000 Units SubCUTAneous BID    montelukast  10 mg Oral Daily    naloxegol  12.5 mg Oral QAM AC    senna  2 tablet Oral Nightly    sevelamer  800 mg Oral Lunch    docusate sodium  100 mg Oral BID    melatonin  6 mg Oral Nightly     Continuous Infusions:   sodium chloride      dextrose       PRN Meds:bisacodyl, sodium chloride, dextrose, dextrose bolus **OR** dextrose bolus, docusate sodium, glucagon (rDNA), glucose, HYDROcodone 5 mg - acetaminophen **OR** HYDROcodone 5 mg - acetaminophen, linaclotide, magnesium hydroxide, ondansetron, polyethylene glycol, sodium chloride flush, acetaminophen, diclofenac sodium    Review of Systems  Pertinent items are noted in HPI.    Objective:     Patient Vitals for the past 8 hrs:   BP Temp Temp src Pulse Resp SpO2  Weight   03/18/24 1648 (!) 140/67 -- -- -- -- -- --   03/18/24 1646 (!) 148/101 98.5 °F (36.9 °C) Oral 99 20 92 % --   03/18/24 1605 132/77 97.3 °F (36.3 °C) -- 87 22 -- 42.8 kg (94 lb 5.7 oz)   03/18/24 1310 (!) 162/77 97.3 °F (36.3 °C) -- 83 21 -- 42.1 kg (92 lb 13 oz)     I/O last 3 completed shifts:  In: 600 [P.O.:600]  Out: -   I/O this shift:  In: 600   Out: 727     BP (!) 140/67   Pulse 99   Temp 98.5 °F (36.9 °C) (Oral)   Resp 20   Ht 1.524 m (5')   Wt 42.8 kg (94 lb 5.7 oz)   SpO2 92%   BMI 18.43 kg/m²     General appearance: alert, appears stated age, and cooperative  Head: Normocephalic, without obvious abnormality, atraumatic  Lungs: clear to auscultation bilaterally  Heart: regular rate and rhythm, S1, S2 normal, no murmur, click, rub or gallop  Abdomen: soft, non-tender; bowel sounds normal; no masses,  no organomegaly  Extremities: extremities normal, atraumatic, no cyanosis or edema  Skin: Skin color, texture, turgor normal. No rashes or lesions  Neurologic:  weak    Electronically signed by Gareth Mosquera MD on 3/18/2024 at 7:00 PM

## 2024-03-18 NOTE — PLAN OF CARE
Problem: Pain  Goal: Verbalizes/displays adequate comfort level or baseline comfort level  Outcome: Progressing  Flowsheets (Taken 3/17/2024 2050)  Verbalizes/displays adequate comfort level or baseline comfort level: Assess pain using appropriate pain scale     Problem: Skin/Tissue Integrity  Goal: Absence of new skin breakdown  Description: 1.  Monitor for areas of redness and/or skin breakdown  2.  Assess vascular access sites hourly  3.  Every 4-6 hours minimum:  Change oxygen saturation probe site  4.  Every 4-6 hours:  If on nasal continuous positive airway pressure, respiratory therapy assess nares and determine need for appliance change or resting period.  Outcome: Progressing     Problem: Safety - Adult  Goal: Free from fall injury  Outcome: Progressing   Care plan reviewed with patient and  verbalizes understanding of the plan of care and contribute to goal setting.

## 2024-03-18 NOTE — PROGRESS NOTES
[facility]   Nephrology progress note  3/18/2024, 11:08 AM      Pt Name:    Deloris Watts  MRN:     [id number]     YOB: 1955  Admit Date:    3/14/2024  5:39 PM    Chief Complaint: Nephrology following for ESRD on hemodialysis.    Subjective:  Patient seen and examined  No chest pain or shortness of breath  Feels okay.  Undergoing rehab.    Objective:  24HR INTAKE/OUTPUT:    Intake/Output Summary (Last 24 hours) at 3/18/2024 1108  Last data filed at 3/17/2024 2050  Gross per 24 hour   Intake 380 ml   Output --   Net 380 ml      Admission weight: 46.7 kg (103 lb)  Wt Readings from Last 3 Encounters:   03/18/24 47.5 kg (104 lb 12.8 oz)   03/13/24 42.7 kg (94 lb 2.2 oz)   02/22/24 47 kg (103 lb 9.6 oz)        Vitals :   Vitals:    03/17/24 2131 03/18/24 0600 03/18/24 0900 03/18/24 0910   BP:    124/85   Pulse:    78   Resp: 17      Temp:   98.1 °F (36.7 °C)    TempSrc:   Oral    SpO2:    100%   Weight:  47.5 kg (104 lb 12.8 oz)     Height:           Physical examination  General Appearance:  Well developed. No distress  Mouth/Throat:  Oral mucosa moist  Neck:  Supple, no JVD  Lungs:  Breath sounds: clear  Heart::  S1,S2 heard  Abdomen:  Soft, non - tender  Musculoskeletal:  Edema - none    Medications:  Infusion:    sodium chloride      dextrose       Meds:    traZODone  100 mg Oral Nightly    amLODIPine  5 mg Oral Daily    atorvastatin  10 mg Oral Daily    buPROPion  300 mg Oral Daily    clopidogrel  75 mg Oral Daily    docusate sodium  1 enema Rectal Daily    folic acid  500 mcg Oral Daily    heparin (porcine)  5,000 Units SubCUTAneous BID    montelukast  10 mg Oral Daily    naloxegol  12.5 mg Oral QAM AC    senna  2 tablet Oral Nightly    sevelamer  800 mg Oral Lunch    docusate sodium  100 mg Oral BID    melatonin  6 mg Oral Nightly       Lab Data :  CBC:   Recent Labs     03/18/24  0643   WBC 9.3   HGB 9.4*   HCT 30.0*        CMP:  Recent Labs     03/18/24  0643

## 2024-03-19 LAB
ANION GAP SERPL CALC-SCNC: 17 MEQ/L (ref 8–16)
BASOPHILS ABSOLUTE: 0.1 THOU/MM3 (ref 0–0.1)
BASOPHILS NFR BLD AUTO: 0.6 %
BUN SERPL-MCNC: 27 MG/DL (ref 7–22)
CALCIUM SERPL-MCNC: 8.7 MG/DL (ref 8.5–10.5)
CHLORIDE SERPL-SCNC: 99 MEQ/L (ref 98–111)
CO2 SERPL-SCNC: 25 MEQ/L (ref 23–33)
CREAT SERPL-MCNC: 3.8 MG/DL (ref 0.4–1.2)
DEPRECATED RDW RBC AUTO: 49.9 FL (ref 35–45)
EOSINOPHIL NFR BLD AUTO: 5.2 %
EOSINOPHILS ABSOLUTE: 0.5 THOU/MM3 (ref 0–0.4)
ERYTHROCYTE [DISTWIDTH] IN BLOOD BY AUTOMATED COUNT: 13.2 % (ref 11.5–14.5)
GFR SERPL CREATININE-BSD FRML MDRD: 12 ML/MIN/1.73M2
GLUCOSE SERPL-MCNC: 79 MG/DL (ref 70–108)
HCT VFR BLD AUTO: 28.5 % (ref 37–47)
HGB BLD-MCNC: 9 GM/DL (ref 12–16)
IMM GRANULOCYTES # BLD AUTO: 0.94 THOU/MM3 (ref 0–0.07)
IMM GRANULOCYTES NFR BLD AUTO: 9.3 %
LYMPHOCYTES ABSOLUTE: 2.6 THOU/MM3 (ref 1–4.8)
LYMPHOCYTES NFR BLD AUTO: 25.1 %
MCH RBC QN AUTO: 32 PG (ref 26–33)
MCHC RBC AUTO-ENTMCNC: 31.6 GM/DL (ref 32.2–35.5)
MCV RBC AUTO: 101.4 FL (ref 81–99)
MONOCYTES ABSOLUTE: 1.1 THOU/MM3 (ref 0.4–1.3)
MONOCYTES NFR BLD AUTO: 10.8 %
NEUTROPHILS NFR BLD AUTO: 49 %
NRBC BLD AUTO-RTO: 0 /100 WBC
PATHOLOGIST REVIEW: ABNORMAL
PLATELET # BLD AUTO: 357 THOU/MM3 (ref 130–400)
PMV BLD AUTO: 9.6 FL (ref 9.4–12.4)
POTASSIUM SERPL-SCNC: 3.6 MEQ/L (ref 3.5–5.2)
RBC # BLD AUTO: 2.81 MILL/MM3 (ref 4.2–5.4)
SEGMENTED NEUTROPHILS ABSOLUTE COUNT: 5 THOU/MM3 (ref 1.8–7.7)
SODIUM SERPL-SCNC: 141 MEQ/L (ref 135–145)
WBC # BLD AUTO: 10.2 THOU/MM3 (ref 4.8–10.8)

## 2024-03-19 PROCEDURE — 80048 BASIC METABOLIC PNL TOTAL CA: CPT

## 2024-03-19 PROCEDURE — 36415 COLL VENOUS BLD VENIPUNCTURE: CPT

## 2024-03-19 PROCEDURE — 97530 THERAPEUTIC ACTIVITIES: CPT

## 2024-03-19 PROCEDURE — 97110 THERAPEUTIC EXERCISES: CPT

## 2024-03-19 PROCEDURE — 97130 THER IVNTJ EA ADDL 15 MIN: CPT

## 2024-03-19 PROCEDURE — 6370000000 HC RX 637 (ALT 250 FOR IP): Performed by: PHYSICAL MEDICINE & REHABILITATION

## 2024-03-19 PROCEDURE — 97535 SELF CARE MNGMENT TRAINING: CPT

## 2024-03-19 PROCEDURE — 85025 COMPLETE CBC W/AUTO DIFF WBC: CPT

## 2024-03-19 PROCEDURE — 97129 THER IVNTJ 1ST 15 MIN: CPT

## 2024-03-19 PROCEDURE — 97116 GAIT TRAINING THERAPY: CPT

## 2024-03-19 PROCEDURE — 6360000002 HC RX W HCPCS: Performed by: PHYSICAL MEDICINE & REHABILITATION

## 2024-03-19 PROCEDURE — 99232 SBSQ HOSP IP/OBS MODERATE 35: CPT | Performed by: INTERNAL MEDICINE

## 2024-03-19 PROCEDURE — 99232 SBSQ HOSP IP/OBS MODERATE 35: CPT | Performed by: PHYSICAL MEDICINE & REHABILITATION

## 2024-03-19 PROCEDURE — 1180000000 HC REHAB R&B

## 2024-03-19 RX ORDER — PANTOPRAZOLE SODIUM 40 MG/1
40 TABLET, DELAYED RELEASE ORAL
Status: DISCONTINUED | OUTPATIENT
Start: 2024-03-20 | End: 2024-03-30 | Stop reason: HOSPADM

## 2024-03-19 RX ADMIN — AMLODIPINE BESYLATE 5 MG: 5 TABLET ORAL at 09:45

## 2024-03-19 RX ADMIN — HYDROCODONE BITARTRATE AND ACETAMINOPHEN 2 TABLET: 5; 325 TABLET ORAL at 22:31

## 2024-03-19 RX ADMIN — Medication 6 MG: at 22:32

## 2024-03-19 RX ADMIN — MONTELUKAST SODIUM 10 MG: 10 TABLET ORAL at 09:44

## 2024-03-19 RX ADMIN — NALOXEGOL OXALATE 12.5 MG: 12.5 TABLET, FILM COATED ORAL at 05:17

## 2024-03-19 RX ADMIN — DOCUSATE SODIUM 100 MG: 100 CAPSULE, LIQUID FILLED ORAL at 22:33

## 2024-03-19 RX ADMIN — ONDANSETRON 4 MG: 4 TABLET, ORALLY DISINTEGRATING ORAL at 22:41

## 2024-03-19 RX ADMIN — SEVELAMER CARBONATE 800 MG: 800 TABLET, FILM COATED ORAL at 12:52

## 2024-03-19 RX ADMIN — CLOPIDOGREL BISULFATE 75 MG: 75 TABLET ORAL at 09:45

## 2024-03-19 RX ADMIN — HEPARIN SODIUM 5000 UNITS: 5000 INJECTION INTRAVENOUS; SUBCUTANEOUS at 22:31

## 2024-03-19 RX ADMIN — FOLIC ACID 500 MCG: 1 TABLET ORAL at 09:44

## 2024-03-19 RX ADMIN — ATORVASTATIN CALCIUM 10 MG: 10 TABLET, FILM COATED ORAL at 09:45

## 2024-03-19 RX ADMIN — BUPROPION HYDROCHLORIDE 300 MG: 300 TABLET, FILM COATED, EXTENDED RELEASE ORAL at 09:45

## 2024-03-19 RX ADMIN — HYDROCODONE BITARTRATE AND ACETAMINOPHEN 2 TABLET: 5; 325 TABLET ORAL at 09:46

## 2024-03-19 RX ADMIN — SENNOSIDES 17.2 MG: 8.6 TABLET, FILM COATED ORAL at 22:33

## 2024-03-19 RX ADMIN — HYDROCODONE BITARTRATE AND ACETAMINOPHEN 2 TABLET: 5; 325 TABLET ORAL at 05:27

## 2024-03-19 RX ADMIN — HEPARIN SODIUM 5000 UNITS: 5000 INJECTION INTRAVENOUS; SUBCUTANEOUS at 09:59

## 2024-03-19 RX ADMIN — DOCUSATE SODIUM 100 MG: 100 CAPSULE, LIQUID FILLED ORAL at 09:45

## 2024-03-19 RX ADMIN — TRAZODONE HYDROCHLORIDE 100 MG: 100 TABLET ORAL at 22:33

## 2024-03-19 ASSESSMENT — PAIN SCALES - GENERAL
PAINLEVEL_OUTOF10: 8
PAINLEVEL_OUTOF10: 7
PAINLEVEL_OUTOF10: 9
PAINLEVEL_OUTOF10: 4
PAINLEVEL_OUTOF10: 8
PAINLEVEL_OUTOF10: 7
PAINLEVEL_OUTOF10: 0
PAINLEVEL_OUTOF10: 7
PAINLEVEL_OUTOF10: 0

## 2024-03-19 ASSESSMENT — PAIN DESCRIPTION - LOCATION
LOCATION: HIP

## 2024-03-19 ASSESSMENT — PAIN DESCRIPTION - ORIENTATION
ORIENTATION: LEFT

## 2024-03-19 ASSESSMENT — PAIN DESCRIPTION - DESCRIPTORS
DESCRIPTORS: SHARP
DESCRIPTORS: STABBING
DESCRIPTORS: SHARP

## 2024-03-19 ASSESSMENT — PAIN - FUNCTIONAL ASSESSMENT
PAIN_FUNCTIONAL_ASSESSMENT: PREVENTS OR INTERFERES SOME ACTIVE ACTIVITIES AND ADLS

## 2024-03-19 ASSESSMENT — PAIN DESCRIPTION - ONSET: ONSET: ON-GOING

## 2024-03-19 ASSESSMENT — PAIN DESCRIPTION - PAIN TYPE: TYPE: SURGICAL PAIN

## 2024-03-19 ASSESSMENT — PAIN DESCRIPTION - FREQUENCY: FREQUENCY: INTERMITTENT

## 2024-03-19 NOTE — PROGRESS NOTES
[facility]  INPATIENT PHYSICAL THERAPY  [facility] - 8K-17/017-A  Daily Note    Time In: 1200  Time Out: 1230  Timed Code Treatment Minutes: 30 Minutes  Minutes: 30          Date: 3/19/2024  Patient Name: Deloris Watts,  Gender:  female        MRN: [id number]  : 1955  (68 y.o.)  Referral Date : 24  Referring Practitioner: Jax Whatley MD  Diagnosis: Closed fracture of neck of left femur, initial encounter  Additional Pertinent Hx: Per H&P:Deloris Watts  is a 68 y.o. right-handed  female with history of hypertension, hyperlipidemia, hydronephrosis, fibromyalgia, irritable bowel syndrome, osteoporosis, end-stage renal disease on hemodialysis (MWF) since 2023, stroke with left side weakness in , anemia, depression, anxiety, right wrist and right ankle fracture treated conservatively, right proximal humeral fracture due to fall requiring ORIF, status post right carpal tunnel release surgery, status post cervical spine surgery, status post hysterectomy and bilateral oophorectomy, hemorrhoidectomy, sinus surgery, tubal ligation, L2 compression fracture requiring kyphoplasty, LASEK surgery, is admitted to the inpatient rehabilitation unit on 3/14/2024 for intensive inpatient rehabilitation treatment impairment and disability secondary to displaced left femur neck fracture as result of fall on 3/10/2024 requiring left hip hemiarthroplasty surgery.The patient says while she was pulling up her pant after using toilet in her home's bathroom on 3/10/2024, she suddenly lost balance and fell to the ground hitting her left hip.  She thinks she also hit her head to something at the time.  She did not loss consciousness.  She immediately feels severe pain at her left hip and was unable to get up even with her 's assistance.  Therefore 911 was called and the patient was sent to [facility] ER by EMS.  Multiple x-ray and CAT  > sit with modified independence to transfer in and out of bed safely.  Long Term Goal 2: Patient will complete sit < > stand with modified independence to stand to ambulate safely.  Long Term Goal 3: Patient will complete bed < > chair transfer with a RW with modified independence to transfer surface to surface safely.  Long Term Goal 4: Patient will ambulate 50' with a RW with SBA to navigate home.  Long Term Goal 5: Patient will complete car transfer with SBA to transfer in and out of vehicle safely.  Additional Goals?: Yes  Long term goal 6: Patient will ascend/descend 1 step with 1 handrail and CGA for home entry.    Following session, patient left in safe position with all fall risk precautions in place.

## 2024-03-19 NOTE — PROGRESS NOTES
Briefly saw patient, who reports good progress in therapy, but hampered by nausea which is rather intense at times, causing her to vomit last evening. Finding it somewhat difficult to get enough fluids as a result. Discussed this with her attending.

## 2024-03-19 NOTE — PROGRESS NOTES
Patient: Deloris Watts  Unit/Bed: 8K-17/017-A  YOB: 1955  MRN: [id number] Acct: [id number]   Admitting Diagnosis: Closed fracture of neck of left femur, initial encounter ([facility]) [S72.002A]  Admit Date:  3/14/2024  Hospital Day: 5    Assessment:     Principal Problem:    Closed fracture of neck of left femur, initial encounter ([facility])  Active Problems:    Depression    Anxiety disorder    History of CVA (cerebrovascular accident)    Hyperlipidemia    Allergic rhinitis    Essential hypertension    Generalized anxiety disorder    ESRD on hemodialysis ([facility])    Chronic constipation  Resolved Problems:    * No resolved hospital problems. *      Plan:     Continue to follow        Subjective:     Patient has no complaint of CP or SOB. She mentions some left leg soreness helped by the heating pad..   Medication side effects: none    Scheduled Meds:   traZODone  100 mg Oral Nightly    amLODIPine  5 mg Oral Daily    atorvastatin  10 mg Oral Daily    buPROPion  300 mg Oral Daily    clopidogrel  75 mg Oral Daily    docusate sodium  1 enema Rectal Daily    folic acid  500 mcg Oral Daily    heparin (porcine)  5,000 Units SubCUTAneous BID    montelukast  10 mg Oral Daily    naloxegol  12.5 mg Oral QAM AC    senna  2 tablet Oral Nightly    sevelamer  800 mg Oral Lunch    docusate sodium  100 mg Oral BID    melatonin  6 mg Oral Nightly     Continuous Infusions:   sodium chloride      dextrose       PRN Meds:bisacodyl, sodium chloride, dextrose, dextrose bolus **OR** dextrose bolus, docusate sodium, glucagon (rDNA), glucose, HYDROcodone 5 mg - acetaminophen **OR** HYDROcodone 5 mg - acetaminophen, linaclotide, magnesium hydroxide, ondansetron, polyethylene glycol, sodium chloride flush, acetaminophen, diclofenac sodium    Review of Systems  Pertinent items are noted in HPI.    Objective:     Patient Vitals for the past 8 hrs:   Resp   03/19/24 0527 18     I/O last 3 completed shifts:  In: 960 [P.O.:360]  Out: 727   No

## 2024-03-19 NOTE — PROGRESS NOTES
[facility]  Diagnosis List for Inpatient Rehab facility (IRF) - Patient Assessment Instrument (DONALD)    Patient Name: Deloris Watts        MRN: [id number]    : 1955  (68 y.o.)  Gender: female     Primary impairment requiring rehabilitation: 8.11 Unilateral Hip Fracture      Etiologic Diagnosis that led to the condition: Displaced left femur neck fracture    Comorbid conditions affecting rehabilitation:  Status post fall on 3/10/2024 in the bathroom resulting  Displaced left femur neck fracture requiring left hip hemiarthroplasty surgery  Head scalp contusion with cerebral concussion without loss of consciousness  Cervical spine sprain and muscular strain  History of stroke with left hemiparesis  End-stage renal disease requiring hemodialysis  History of fall resulting liver laceration and acute L2 compression fracture requiring kyphoplasty  Hypertension  Irritable bowel syndrome  Hyperlipidemia  Osteoporosis  Allergic rhinitis  History of hydronephrosis  History of fibromyalgia  History of depression and anxiety  History of right proximal humerus fracture requiring ORIF  History of right wrist fracture requiring casting  History of right ankle fracture requiring casting  Cognitive impairment    ANTON DAVIS MD

## 2024-03-19 NOTE — PROGRESS NOTES
[facility]  INPATIENT PHYSICAL THERAPY  [facility] - 8K-17/017-A  Daily Note    Time In: 0730  Time Out: 0830  Timed Code Treatment Minutes: 60 Minutes  Minutes: 60          Date: 3/19/2024  Patient Name: Deloris Watts,  Gender:  female        MRN: [id number]  : 1955  (68 y.o.)  Referral Date : 24  Referring Practitioner: Jax Whatley MD  Diagnosis: Closed fracture of neck of left femur, initial encounter  Additional Pertinent Hx: Per H&P:Deloris Watts  is a 68 y.o. right-handed  female with history of hypertension, hyperlipidemia, hydronephrosis, fibromyalgia, irritable bowel syndrome, osteoporosis, end-stage renal disease on hemodialysis (MWF) since 2023, stroke with left side weakness in , anemia, depression, anxiety, right wrist and right ankle fracture treated conservatively, right proximal humeral fracture due to fall requiring ORIF, status post right carpal tunnel release surgery, status post cervical spine surgery, status post hysterectomy and bilateral oophorectomy, hemorrhoidectomy, sinus surgery, tubal ligation, L2 compression fracture requiring kyphoplasty, LASEK surgery, is admitted to the inpatient rehabilitation unit on 3/14/2024 for intensive inpatient rehabilitation treatment impairment and disability secondary to displaced left femur neck fracture as result of fall on 3/10/2024 requiring left hip hemiarthroplasty surgery.The patient says while she was pulling up her pant after using toilet in her home's bathroom on 3/10/2024, she suddenly lost balance and fell to the ground hitting her left hip.  She thinks she also hit her head to something at the time.  She did not loss consciousness.  She immediately feels severe pain at her left hip and was unable to get up even with her 's assistance.  Therefore 911 was called and the patient was sent to [facility] ER by EMS.  Multiple x-ray and CAT  scan were done.  The patient was found to have acute displaced left femur neck fracture.  She then was admitted.  Orthopedic was contacted to evaluate the hip fracture.  Nephrology service was consulted for continuing her 3 times daily hemodialysis.  Surgical intervention for the left femur neck displaced fracture was recommended.  The patient then underwent left hip hemiarthroplasty on 3/11/2024 by Dr. Virgilio Lezama for the left displaced femoral neck fracture.  She was allowed to have weightbearing as tolerated on left lower extremity postoperatively with posterior hip precaution.The patient was previously in inpatient rehab service from 12/21/2023 to 1/5/2024 for intensive inpatient management of impairment & disability secondary to fall accident on 12/14/2023 resulting L2 compression fracture requiring kyphoplasty, and liver laceration.  She was discharged to home on 1/5/2024 with referral for outpatient PT, OT and speech therapy.     Prior Level of Function:  Lives With: Spouse  Type of Home: House  Home Layout: One level  Home Access: Stairs to enter without rails  Entrance Stairs - Number of Steps: 1 MARIANA with left sided grab bar on the wall  Home Equipment: Walker, rolling, Walker, standard, Wheelchair-manual, Lift chair (transport chair)   Bathroom Shower/Tub: Tub/Shower unit  Bathroom Equipment: Shower chair, Grab bars in shower  Bathroom Accessibility: Not accessible    ADL Assistance: Independent  Homemaking Assistance: Needs assistance (spouse completes cooking, cleaning, laundry and grocery shopping)  Homemaking Responsibilities: No  Ambulation Assistance: Independent  Transfer Assistance: Independent  Active : No  Additional Comments: Patient reports she walks with a rolling walker PTA. Patient was on IPR in Dec 2023 for 2 weeks after falling and breaking L2 vertebra.  very supportive and able to assist at discharge.  takes patient to dialysis

## 2024-03-19 NOTE — PROGRESS NOTES
RECREATIONAL THERAPY  [facility]      Date:  3/19/2024            Patient Name: Deloris Watts           MRN: [id number]  Acct: [id number]          YOB: 1955 (68 y.o.)       Gender: female   Diagnosis: Closed fracture of neck of left femur, initial encounter  Physician: Referring Practitioner: Jax Whatley MD    REASON FOR MISSED TREATMENT:  Pt in bed this afternoon-no RT today    Elena Tan, CTRS    3/19/2024

## 2024-03-19 NOTE — PROGRESS NOTES
[facility]   Nephrology progress note  3/19/2024, 9:18 AM      Pt Name:    Deloris Watts  MRN:     [id number]     YOB: 1955  Admit Date:    3/14/2024  5:39 PM    Chief Complaint: Nephrology following for ESRD on hemodialysis.    Subjective:  Patient seen and examined  No chest pain or shortness of breath  Feels okay.  No other complaints    Objective:  24HR INTAKE/OUTPUT:    Intake/Output Summary (Last 24 hours) at 3/19/2024 0918  Last data filed at 3/19/2024 0535  Gross per 24 hour   Intake 920 ml   Output 727 ml   Net 193 ml        Admission weight: 46.7 kg (103 lb)  Wt Readings from Last 3 Encounters:   03/18/24 42.8 kg (94 lb 5.7 oz)   03/13/24 42.7 kg (94 lb 2.2 oz)   02/22/24 47 kg (103 lb 9.6 oz)        Vitals :   Vitals:    03/18/24 1648 03/18/24 1914 03/18/24 2054 03/19/24 0527   BP: (!) 140/67  137/61    Pulse:   87    Resp:  18 16 18   Temp:   98.6 °F (37 °C)    TempSrc:   Oral    SpO2:   98%    Weight:       Height:           Physical examination  General Appearance:  Well developed. No distress  Mouth/Throat:  Oral mucosa moist  Neck:  Supple, no JVD  Lungs:  Breath sounds: clear  Heart::  S1,S2 heard  Abdomen:  Soft, non - tender  Musculoskeletal:  Edema - none    Medications:  Infusion:    sodium chloride      dextrose       Meds:    traZODone  100 mg Oral Nightly    amLODIPine  5 mg Oral Daily    atorvastatin  10 mg Oral Daily    buPROPion  300 mg Oral Daily    clopidogrel  75 mg Oral Daily    docusate sodium  1 enema Rectal Daily    folic acid  500 mcg Oral Daily    heparin (porcine)  5,000 Units SubCUTAneous BID    montelukast  10 mg Oral Daily    naloxegol  12.5 mg Oral QAM AC    senna  2 tablet Oral Nightly    sevelamer  800 mg Oral Lunch    docusate sodium  100 mg Oral BID    melatonin  6 mg Oral Nightly       Lab Data :  CBC:   Recent Labs     03/18/24  0643 03/19/24  0615   WBC 9.3 10.2   HGB 9.4* 9.0*   HCT 30.0* 28.5*    357       CMP:  Recent Labs

## 2024-03-19 NOTE — PROGRESS NOTES
[facility]  Occupational Therapy  Daily Note  Time:   Time In: 1130  Time Out: 1200  Timed Code Treatment Minutes: 30 Minutes  Minutes: 30          Date: 3/19/2024  Patient Name: Deloris Watts,   Gender: female      Room: [facility]17/017-A  MRN: [id number]  : 1955  (68 y.o.)  Referring Practitioner: Jax Whatley MD  Diagnosis: Closed fracture of neck of left femur, initial encounter  Additional Pertinent Hx: Per H&P:Deloris Watts  is a 68 y.o. right-handed  female with history of hypertension, hyperlipidemia, hydronephrosis, fibromyalgia, irritable bowel syndrome, osteoporosis, end-stage renal disease on hemodialysis (MWF) since 2023, stroke with left side weakness in , anemia, depression, anxiety, right wrist and right ankle fracture treated conservatively, right proximal humeral fracture due to fall requiring ORIF, status post right carpal tunnel release surgery, status post cervical spine surgery, status post hysterectomy and bilateral oophorectomy, hemorrhoidectomy, sinus surgery, tubal ligation, L2 compression fracture requiring kyphoplasty, LASEK surgery, is admitted to the inpatient rehabilitation unit on 3/14/2024 for intensive inpatient rehabilitation treatment impairment and disability secondary to displaced left femur neck fracture as result of fall on 3/10/2024 requiring left hip hemiarthroplasty surgery.The patient says while she was pulling up her pant after using toilet in her home's bathroom on 3/10/2024, she suddenly lost balance and fell to the ground hitting her left hip.  She thinks she also hit her head to something at the time.  She did not loss consciousness.  She immediately feels severe pain at her left hip and was unable to get up even with her 's assistance.  Therefore 911 was called and the patient was sent to [facility] ER by EMS.  Multiple x-ray and CAT scan were done.  The    Discharge Recommendations: Home with assistance of aide and Home with Home Health OT  Equipment Recommendations: Other: Pt has a shower chair for her tub/shower- monitor for tub transfer bench and elevated toilet seat  Plan: Times Per Week: 5x/wk for 60 min  Current Treatment Recommendations: Strengthening, Balance training, Functional mobility training, Endurance training, Safety education & training, Self-Care / ADL, Home management training, Patient/Caregiver education & training, Equipment evaluation, education, & procurement, Coordination training    Education:  Learners: Patient  Plan of Care, ADL's, Energy Conservation, Precautions, Reviewed Prior Education, and Importance of Increasing Activity    Goals  Short Term Goals  Time Frame for Short Term Goals: 1 week  Short Term Goal 1: Pt will complete LB dressing with LHAE and CGA to increase indep in home environment while maintaining hip precautions.  Short Term Goal 2: Pt will complete functional standing task x 3 minutes with 1 UE release and SBA to increase indep and endurance with all sinkside grooming and clothing management.  Short Term Goal 3: Pt will navigate to/from BR with SBA and min vcs for safety to increase indep and endurance with all toileting.  Short Term Goal 4: Pt will perform full body bathing with min A and AE PRN to increase indep in home environment.  Short Term Goal 5: Pt will demonstrate 3/3 hip precautions during ADL routine with min vcs to improve compliance with hip precautions.  Long Term Goals  Time Frame for Long Term Goals : 2 weeks from OT evaluation  Long Term Goal 1: Pt will complete BADL routine with set up assistance to improve indep with self care routine  Long Term Goal 2: Pt will use walker to complete light IADL task with supervision to improve indep within her home.    Following session, patient left in safe position with all fall risk precautions in place.

## 2024-03-19 NOTE — PROGRESS NOTES
and abduction passive ROM reaching 170 degree; right wrist extension passive ROM reaching 30 degrees; right hip flexion passive ROM reaching 95 degree; left hip flexion passive ROM reaching 70 degrees limited by severe left hip pain; left knee flexion passive ROM limited to 100 degrees limited by the increasing left hip pain; ankle dorsiflexion passive ROM reaching 5 degrees on right side and 0 degree on left side; normal functional joints ROM at the rest of bilateral upper & lower extremities  Cerebral :  alert ; awake ; oriented to place, person and time; follow 1-2 step verbal command  Cerebellum : no dysmetria with bilateral finger-to-nose test but with tremor; heel-to-shin test could not be performed on left side  Cranial Nerves :  grossly intact CN II to XII function  Sensory : intact light touch and pin prick sensation at bilateral upper & lower extremities  Motor : normal muscle tone at bilateral upper & right lower extremities ; left lower extremity muscle tone not assessed; 4+/5 muscle strength at the right shoulder flexion and abduction ; 4+/5 to 5/5 muscle strength at the right wrist extension; 4-/5 to 4+/5  muscle strength at bilateral fingers abduction; 4-/5 muscle strength at the bilateral handgrips; 3-/5 to 3+/5 muscle strength at the right hip flexion limited by increasing left hip pain; 4+/5 muscle strength at the right knee flexion; 2-/5 to 2+/5 muscle strength at left hip flexion and abduction limited by significantly increasing left hip pain; 2+/5 muscle strength at left hip adduction limited by pain at the left hip; 3-/5 to 3+/5 muscle strength at left knee flexion due to increasing left hip pain; 3+/5 to 4-/5 muscle strength at left knee extension due to increase in left hip pain; 4+/5 muscle strength at the left ankle dorsiflexion and plantarflexion; normal 5/5 muscle strength at the rest of bilateral upper & lower extremities  Reflex : 2+ bilateral bicep and bilateral brachioradialis  reflexes; 0 bilateral knees reflexes  Pathological Reflex :  No Jossy's sign ; no ankle clonus;  Gait : Not assessed      Diagnostics:   Recent Results (from the past 24 hour(s))   Basic Metabolic Panel    Collection Time: 03/20/24  7:05 AM   Result Value Ref Range    Sodium 140 135 - 145 meq/L    Potassium 3.9 3.5 - 5.2 meq/L    Chloride 99 98 - 111 meq/L    CO2 22 (L) 23 - 33 meq/L    Glucose 75 70 - 108 mg/dL    BUN 42 (H) 7 - 22 mg/dL    Creatinine 6.0 (HH) 0.4 - 1.2 mg/dL    Calcium 8.8 8.5 - 10.5 mg/dL   Anion Gap    Collection Time: 03/20/24  7:05 AM   Result Value Ref Range    Anion Gap 19.0 (H) 8.0 - 16.0 meq/L   Glomerular Filtration Rate, Estimated    Collection Time: 03/20/24  7:05 AM   Result Value Ref Range    Est, Glom Filt Rate 7 (A) >60 ml/min/1.73m2       Impression:  Status post fall on 3/10/2024 in the bathroom resulting  Displaced left femur neck fracture requiring left hip hemiarthroplasty surgery  Head scalp contusion with cerebral concussion without loss of consciousness  Cervical spine sprain and muscular strain  History of stroke with left hemiparesis  End-stage renal disease requiring hemodialysis  History of fall resulting liver laceration and acute L2 compression fracture requiring kyphoplasty  Hypertension  Irritable bowel syndrome  Hyperlipidemia  Osteoporosis  Allergic rhinitis  Vomiting episode possibly due to GERD  History of hydronephrosis  History of fibromyalgia  History of depression and anxiety  History of right proximal humerus fracture requiring ORIF  History of right wrist fracture requiring casting  History of right ankle fracture requiring casting  Cognitive impairment     The patient's overall condition remains stable.  She continues complains of pain at the left hip area requiring use of narcotic medication for pain control.  However her left hip ROM is improving.  She still has significant weakness at the left hip.  She continues tolerating the intensive

## 2024-03-19 NOTE — PLAN OF CARE
Problem: Discharge Planning  Goal: Discharge to home or other facility with appropriate resources  3/19/2024 0957 by Kusum Guajardo LISW  Note: Team conference held Tuesday, 3/19. Recommendations of the team were explained to the patient by Dr Whatley and SW. Team is recommending that patient continue on acute inpatient rehab for PT, OT and ST, with expected discharge date of Saturday, 3/30. Following discharge, team is recommending home health vs outpatient pending patient's progress. Care plan reviewed with patient.  Patient verbalized understanding of the plan of care and contributed to goal setting. SW to follow and maintain involvement in discharge planning.

## 2024-03-19 NOTE — PROGRESS NOTES
[facility]  INPATIENT SPEECH THERAPY  [facility]  DAILY NOTE    TIME   SLP Individual Minutes  Time In: 1030  Time Out: 1100  Minutes: 30  Timed Code Treatment Minutes: 30 Minutes       Date: 3/19/2024  Patient Name: Deloris Watts      CSN: [id number]   : 1955  (68 y.o.)  Gender: female   Referring Physician:  Dr. Jax Whatley  Diagnosis: Closed Fracture of Neck of Left Femur   Precautions: fall risk  Current Diet: Regular solids. Thin liquids.  Respiratory Status: Room Air  Swallowing Strategies: Standard Universal Swallow Precautions  Date of Last MBS/FEES: Not Applicable    Pain:  No pain reported.    Subjective:  Patient resting in bed upon ST arrival. Patient participatory and pleasant. No family present.     Short-Term Goals:  SHORT TERM GOAL #1:  Goal 1: Patient will complete delayed recall tasks of 3+ units of information (including Cog-Log) with 75% accuracy, with or without use of compensatory strategies, given mod cues, to improve retention of novel information.  INTERVENTIONS:   Recall of ST information (name, 1 son, expecting in May)  ~1 Day recall: 2/3 independent, 1/3 max cue    Novel Recall of ST Intern (name, cat, ST student)  Immediate recall: 2/3 independent, 1/3 max cue  ~25 minute delayed recall: 1/3 independent, 1/3 min cue, 1/3 max cue    Novel Recall of Hip Precautions (no bending, crossing, or twisting)  Immediate recall: 3/3 independent with visual aid  ~25 minute delayed recall: 3/3 independent with visual aid    Cog-Lo/30  *Score Previous Date:   *Deficits: date, repeat address, months reversed, fist-edge-palm, go/no-go, delayed address recall    SHORT TERM GOAL #2:  Goal 2: Patient will complete functional executive functioning (basic finances, medications), problem solving (time, money) and verbal/visual reasoning tasks with 80% accuracy at given mod cues for potential return to PLOF.   INTERVENTIONS:   Functional Written  Sequencin Step: independent   6 Step: independent    7 Step: max cues  *Good visual reasoning; decreased as steps increased to 7.    SHORT TERM GOAL #3:  Goal 3: Patient will complete thought organization tasks with 70% accuracy given mod cues to improve processing speed and organization during ADL tasks.   INTERVENTIONS:   Interpreting a Monthly Calendar: 1/3 independent, 1/3 min cue, 1/3 max cue  *Did not finish; complete in subsequent session.   *Good visual scanning.  *Decreased mental math calculations.     SHORT TERM GOAL #4:  Goal 4: Patient will complete attention tasks (sustained, selective, alternating, divided) with no more than 5 errors/redirections given mod cues multi-tasking during ADL completion.   INTERVENTIONS: Not directly addressed due to focus on other goals. Good general sustained attention throughout tasks; increased participation from previous session.     SHORT TERM GOAL #5:  Goal 5: Patient will adhere to modified low stimulation guidelines, including completion of the ACE, with score <8 over 3 consecutive days to maximize neurological healing.   INTERVENTIONS:   Acute Concussion Evaluation (ACE):   Physical Symptoms: 4/10  Cognitive Symptoms:   Emotional Symptoms:   Sleep Symptoms:      Acute Concussion Evaluation (ACE) completed this date following admission with documented concern for CHI during mechanical fall. ACE score of  calculated; It should be noted that a score with concerns for concussion are greater than . Score in previous date of . Continuation of low stimulation guidelines.   *RN also reports occurrence of vomiting in previous evening; Question potential over-stimulation vs items orally consumed potentially causing discomfort.    Long-Term Goals:  Time Frame for Long Term Goals: 2 Weeks    LONG TERM GOAL #1:  Goal 1: Patient will improve cognitive functioning to minimal assist (FIM 4) to improve return to least restrictive environment.

## 2024-03-19 NOTE — PROGRESS NOTES
with assistance of aide and Home with Home Health OT  Equipment Recommendations: Other: Pt has a shower chair for her tub/shower- monitor for tub transfer bench and elevated toilet seat  Plan: Times Per Week: 5x/wk for 60 min  Current Treatment Recommendations: Strengthening, Balance training, Functional mobility training, Endurance training, Safety education & training, Self-Care / ADL, Home management training, Patient/Caregiver education & training, Equipment evaluation, education, & procurement, Coordination training    Education:  Learners: Patient  Plan of Care, ADL's, Energy Conservation, Precautions, Reviewed Prior Education, and Importance of Increasing Activity    Goals  Short Term Goals  Time Frame for Short Term Goals: 1 week  Short Term Goal 1: Pt will complete LB dressing with LHAE and CGA to increase indep in home environment while maintaining hip precautions.  Short Term Goal 2: Pt will complete functional standing task x 3 minutes with 1 UE release and SBA to increase indep and endurance with all sinkside grooming and clothing management.  Short Term Goal 3: Pt will navigate to/from BR with SBA and min vcs for safety to increase indep and endurance with all toileting.  Short Term Goal 4: Pt will perform full body bathing with min A and AE PRN to increase indep in home environment.  Short Term Goal 5: Pt will demonstrate 3/3 hip precautions during ADL routine with min vcs to improve compliance with hip precautions.  Long Term Goals  Time Frame for Long Term Goals : 2 weeks from OT evaluation  Long Term Goal 1: Pt will complete BADL routine with set up assistance to improve indep with self care routine  Long Term Goal 2: Pt will use walker to complete light IADL task with supervision to improve indep within her home.    Following session, patient left in safe position with all fall risk precautions in place.

## 2024-03-19 NOTE — PLAN OF CARE
Problem: Discharge Planning  Goal: Discharge to home or other facility with appropriate resources  Flowsheets (Taken 3/18/2024 2054 by Beverley Hendrickson RN)  Discharge to home or other facility with appropriate resources: Identify barriers to discharge with patient and caregiver  Note:   Preparedness Assessment for the Transition Home (PATH-25) Instrument   (Lurdes Key & Hallie Arguelles, 2018)    1. How much do you understand about the patient's expected recovery over the next 6 months? I have a lot of understanding about the patient's expected recovery over the next 6 months. (4)   2. How much do you understand about how the patient's injury/illness will affect your lives over the next 6 months? I understand a lot about how the injury/illness will affect our lives over the next 6 months. (4)   3. How much do you understand about what you need to do to get things ready before the patient goes home? I understand a lot about what I need to do to get ready before the patient goes home. (4)   4. How much do you understand about what assistance the patient will need with personal care (such as bathing, using the toilet, dressing, and moving around) when he/she goes home? I understand a lot about what assistance the patient will need with personal care when he/she goes home. (4)   5. How much experience have you had providing physical help with personal care (such as bathing, using the toilet, dressing and moving around) for someone who has an injury/illness or other disability? I have a year or more of experience providing physical help with personal care for someone who has an injury/illness or other disability. (4)   6. How prepared are you to provide the patient assistance with personal care (such as bathing, using the toilet, dressing and moving around) when he/she goes home? I am very prepared to provide the patient assistance with personal care when he/she goes home. (4)   7. How willing are you to provide

## 2024-03-20 LAB
ANION GAP SERPL CALC-SCNC: 19 MEQ/L (ref 8–16)
BUN SERPL-MCNC: 42 MG/DL (ref 7–22)
CALCIUM SERPL-MCNC: 8.8 MG/DL (ref 8.5–10.5)
CHLORIDE SERPL-SCNC: 99 MEQ/L (ref 98–111)
CO2 SERPL-SCNC: 22 MEQ/L (ref 23–33)
CREAT SERPL-MCNC: 6 MG/DL (ref 0.4–1.2)
GFR SERPL CREATININE-BSD FRML MDRD: 7 ML/MIN/1.73M2
GLUCOSE SERPL-MCNC: 75 MG/DL (ref 70–108)
POTASSIUM SERPL-SCNC: 3.9 MEQ/L (ref 3.5–5.2)
SODIUM SERPL-SCNC: 140 MEQ/L (ref 135–145)

## 2024-03-20 PROCEDURE — 90935 HEMODIALYSIS ONE EVALUATION: CPT

## 2024-03-20 PROCEDURE — 80048 BASIC METABOLIC PNL TOTAL CA: CPT

## 2024-03-20 PROCEDURE — 97535 SELF CARE MNGMENT TRAINING: CPT

## 2024-03-20 PROCEDURE — 6370000000 HC RX 637 (ALT 250 FOR IP): Performed by: PHYSICAL MEDICINE & REHABILITATION

## 2024-03-20 PROCEDURE — 97112 NEUROMUSCULAR REEDUCATION: CPT

## 2024-03-20 PROCEDURE — 97116 GAIT TRAINING THERAPY: CPT

## 2024-03-20 PROCEDURE — 99232 SBSQ HOSP IP/OBS MODERATE 35: CPT | Performed by: INTERNAL MEDICINE

## 2024-03-20 PROCEDURE — 99232 SBSQ HOSP IP/OBS MODERATE 35: CPT | Performed by: PHYSICAL MEDICINE & REHABILITATION

## 2024-03-20 PROCEDURE — 97110 THERAPEUTIC EXERCISES: CPT

## 2024-03-20 PROCEDURE — 6360000002 HC RX W HCPCS: Performed by: PHYSICAL MEDICINE & REHABILITATION

## 2024-03-20 PROCEDURE — 1180000000 HC REHAB R&B

## 2024-03-20 PROCEDURE — 97130 THER IVNTJ EA ADDL 15 MIN: CPT

## 2024-03-20 PROCEDURE — 97530 THERAPEUTIC ACTIVITIES: CPT

## 2024-03-20 PROCEDURE — 97129 THER IVNTJ 1ST 15 MIN: CPT

## 2024-03-20 PROCEDURE — 36415 COLL VENOUS BLD VENIPUNCTURE: CPT

## 2024-03-20 RX ADMIN — CLOPIDOGREL BISULFATE 75 MG: 75 TABLET ORAL at 09:00

## 2024-03-20 RX ADMIN — DICLOFENAC SODIUM 2 G: 10 GEL TOPICAL at 20:12

## 2024-03-20 RX ADMIN — ATORVASTATIN CALCIUM 10 MG: 10 TABLET, FILM COATED ORAL at 08:53

## 2024-03-20 RX ADMIN — HEPARIN SODIUM 5000 UNITS: 5000 INJECTION INTRAVENOUS; SUBCUTANEOUS at 20:12

## 2024-03-20 RX ADMIN — Medication 6 MG: at 20:12

## 2024-03-20 RX ADMIN — HYDROCODONE BITARTRATE AND ACETAMINOPHEN 2 TABLET: 5; 325 TABLET ORAL at 07:37

## 2024-03-20 RX ADMIN — DOCUSATE SODIUM 100 MG: 100 CAPSULE, LIQUID FILLED ORAL at 09:00

## 2024-03-20 RX ADMIN — BUPROPION HYDROCHLORIDE 300 MG: 300 TABLET, FILM COATED, EXTENDED RELEASE ORAL at 08:53

## 2024-03-20 RX ADMIN — PANTOPRAZOLE SODIUM 40 MG: 40 TABLET, DELAYED RELEASE ORAL at 06:41

## 2024-03-20 RX ADMIN — MONTELUKAST SODIUM 10 MG: 10 TABLET ORAL at 08:53

## 2024-03-20 RX ADMIN — HYDROCODONE BITARTRATE AND ACETAMINOPHEN 1 TABLET: 5; 325 TABLET ORAL at 20:12

## 2024-03-20 RX ADMIN — HEPARIN SODIUM 5000 UNITS: 5000 INJECTION INTRAVENOUS; SUBCUTANEOUS at 08:54

## 2024-03-20 RX ADMIN — NALOXEGOL OXALATE 12.5 MG: 12.5 TABLET, FILM COATED ORAL at 06:41

## 2024-03-20 RX ADMIN — SENNOSIDES 17.2 MG: 8.6 TABLET, FILM COATED ORAL at 20:12

## 2024-03-20 RX ADMIN — AMLODIPINE BESYLATE 5 MG: 5 TABLET ORAL at 08:53

## 2024-03-20 RX ADMIN — DOCUSATE SODIUM 100 MG: 100 CAPSULE, LIQUID FILLED ORAL at 20:12

## 2024-03-20 RX ADMIN — FOLIC ACID 500 MCG: 1 TABLET ORAL at 08:54

## 2024-03-20 RX ADMIN — TRAZODONE HYDROCHLORIDE 100 MG: 100 TABLET ORAL at 20:12

## 2024-03-20 ASSESSMENT — PAIN SCALES - GENERAL
PAINLEVEL_OUTOF10: 8
PAINLEVEL_OUTOF10: 3
PAINLEVEL_OUTOF10: 10
PAINLEVEL_OUTOF10: 8
PAINLEVEL_OUTOF10: 8

## 2024-03-20 ASSESSMENT — PAIN DESCRIPTION - LOCATION
LOCATION: HIP
LOCATION: HIP

## 2024-03-20 ASSESSMENT — PAIN DESCRIPTION - ORIENTATION
ORIENTATION: LEFT
ORIENTATION: LEFT

## 2024-03-20 ASSESSMENT — PAIN DESCRIPTION - DESCRIPTORS: DESCRIPTORS: ACHING

## 2024-03-20 ASSESSMENT — PAIN - FUNCTIONAL ASSESSMENT: PAIN_FUNCTIONAL_ASSESSMENT: PREVENTS OR INTERFERES SOME ACTIVE ACTIVITIES AND ADLS

## 2024-03-20 NOTE — PROGRESS NOTES
[facility]   Nephrology progress note  3/20/2024, 10:30 AM      Pt Name:    Deloris Watts  MRN:     [id number]     YOB: 1955  Admit Date:    3/14/2024  5:39 PM    Chief Complaint: Nephrology following for ESRD on hemodialysis.    Subjective:  Patient seen and examined  No chest pain or shortness of breath  Feels okay.  No other complaints    Objective:  24HR INTAKE/OUTPUT:    Intake/Output Summary (Last 24 hours) at 3/20/2024 1030  Last data filed at 3/19/2024 2213  Gross per 24 hour   Intake 740 ml   Output --   Net 740 ml        Admission weight: 46.7 kg (103 lb)  Wt Readings from Last 3 Encounters:   03/20/24 46.6 kg (102 lb 11.8 oz)   03/13/24 42.7 kg (94 lb 2.2 oz)   02/22/24 47 kg (103 lb 9.6 oz)        Vitals :   Vitals:    03/19/24 1024 03/19/24 2213 03/20/24 0545 03/20/24 0847   BP: (!) 142/74 132/62  (!) 150/66   Pulse: 77 88  77   Resp: 14 16  16   Temp: 97.7 °F (36.5 °C) 98.6 °F (37 °C)  97.7 °F (36.5 °C)   TempSrc: Oral Oral  Oral   SpO2: 98% 98%  97%   Weight:   46.6 kg (102 lb 11.8 oz)    Height:           Physical examination  General Appearance:  Well developed. No distress  Mouth/Throat:  Oral mucosa moist  Neck:  Supple, no JVD  Lungs:  Breath sounds: clear  Heart::  S1,S2 heard  Abdomen:  Soft, non - tender  Musculoskeletal:  Edema - none    Medications:  Infusion:    sodium chloride      dextrose       Meds:    pantoprazole  40 mg Oral QAM AC    diclofenac sodium  2 g Topical BID    traZODone  100 mg Oral Nightly    amLODIPine  5 mg Oral Daily    atorvastatin  10 mg Oral Daily    buPROPion  300 mg Oral Daily    clopidogrel  75 mg Oral Daily    docusate sodium  1 enema Rectal Daily    folic acid  500 mcg Oral Daily    heparin (porcine)  5,000 Units SubCUTAneous BID    montelukast  10 mg Oral Daily    naloxegol  12.5 mg Oral QAM AC    senna  2 tablet Oral Nightly    sevelamer  800 mg Oral Lunch    docusate sodium  100 mg Oral BID    melatonin  6 mg Oral Nightly        Lab Data :  CBC:   Recent Labs     03/18/24  0643 03/19/24  0615   WBC 9.3 10.2   HGB 9.4* 9.0*   HCT 30.0* 28.5*    357       CMP:  Recent Labs     03/18/24  0643 03/19/24  0615 03/20/24  0705    141 140   K 4.4 3.6 3.9    99 99   CO2 22* 25 22*   BUN 59* 27* 42*   CREATININE 6.0* 3.8* 6.0*   GLUCOSE 64* 79 75   CALCIUM 9.3 8.7 8.8       Hepatic: No results for input(s): \"LABALBU\", \"AST\", \"ALT\", \"ALB\", \"BILITOT\", \"ALKPHOS\" in the last 72 hours.    Assessment and Plan:  Renal -ESRD on hemodialysis Monday Wednesday Friday  Volume status and electrolytes reasonably stable  Will get her dialyzed today  Electrolytes -within normal limits  Mild acidosis improved with the dialysis  Essential hypertension running well  Fracture femur status post surgery 3/11/2024 undergoing rehab  Meds reviewed and discussed with patient    To Cuadra MD  [facility]    This report has been created using voice recognition software. It may contain minor errors which are inherent in voice recognition technology

## 2024-03-20 NOTE — PROGRESS NOTES
[facility]  INPATIENT SPEECH THERAPY  [facility]  DAILY NOTE    TIME   SLP Individual Minutes  Time In: 0800  Time Out: 0830  Minutes: 30  Timed Code Treatment Minutes: 30 Minutes     Date: 3/20/2024  Patient Name: Deloris Watts      CSN: [id number]   : 1955  (68 y.o.)  Gender: female   Referring Physician:  Dr. Jax Whatley  Diagnosis: Closed Fracture of Neck of Left Femur   Precautions: fall risk  Current Diet: Regular solids. Thin liquids.  Respiratory Status: Room Air  Swallowing Strategies: Standard Universal Swallow Precautions  Date of Last MBS/FEES: Not Applicable    Pain:  8/10 - Pain location: Femur; RN aware and gave pain medication prior to session.    Subjective: Patient sitting in recliner upon ST arrival. Patient participatory and pleasant. No family present.    Short-Term Goals:  SHORT TERM GOAL #1:  Goal 1: Patient will complete delayed recall tasks of 3+ units of information (including Cog-Log) with 75% accuracy, with or without use of compensatory strategies, given mod cues, to improve retention of novel information.  INTERVENTIONS:   Recall of ST Intern (name, cat, ST student)  ~1 Day Delayed Recall: 2/3 mod cues with written visual aid, 1/3 max cues  Additional ~25 minute Delayed Recall: 3/3 independent with written visual aid  *Patient unaware of written visual aid made in previous session; ST prompted use, patient with difficulty reading handwriting; used in 2/2 trials.    Recall of Hip Precautions (no bending, crossing, or twisting)  ~1 Day Delayed Recall: 3/3 independent with written visual aid  *Patient independently aware of precautions on whiteboard.    Cog-Lo/30  *Score Previous Date:   *Deficits: date, repeat address, -, months reversed, fist-edge-palm, go/no-go, delayed address recall    SHORT TERM GOAL #2:  Goal 2: Patient will complete functional executive functioning (basic finances, medications), problem solving  date following admission with documented concern for CHI during mechanical fall. ACE score of  calculated; It should be noted that a score with concerns for concussion are greater than . Score in previous date of . Continuation of low stimulation guidelines.     Long-Term Goals:  Time Frame for Long Term Goals: 2 Weeks    LONG TERM GOAL #1:  Goal 1: Patient will improve cognitive functioning to minimal assist (FIM 4) to improve return to least restrictive environment.        Comprehension: 5 - Patient understands basic needs (hungry/hot/pain)  Expression: 5 - Expresses basic ideas/needs only (hungry/hot/pain)  Social Interaction: 6 - Patient requires medication for mood and/or effect  Problem Solvin - Patient solves simple/routine tasks 75-90%+   Memory: 3 - Patient remembers 50%-74% of the time    Functional Oral Intake Scale: Total Oral Intake: 7.  Total oral intake with no restrictions    EDUCATION:  Learner: Patient  Education:  Reviewed ST goals and Plan of Care, Reviewed recommendations for follow-up, and Education Related to Potential Risks and Complications Due to Impairment/Illness/Injury  Evaluation of Education: Verbalizes understanding, Demonstrates with assistance, Needs further instruction, and Family not present    ASSESSMENT/PLAN:  Activity Tolerance:  Patient tolerance of treatment: good. Actively engaged and participatory.  Assessment/Plan: Patient progressing toward established goals.  Continues to require skilled care of licensed speech pathologist to progress toward achievement of established goals and plan of care.  Plan for Next Session: recall, evaluating a pillbox for errors, problem solving (money), strategy training/carryover, time management  Discharge Recommendations: Home with Home Exercise Program, Outpatient Speech Therapy, and Continue to Assess Pending Progress       MARICRUZ Paulson. Speech Intern

## 2024-03-20 NOTE — PLAN OF CARE
Problem: Discharge Planning  Goal: Discharge to home or other facility with appropriate resources  3/20/2024 1107 by Kaya Nick RN  Outcome: Progressing  Flowsheets (Taken 3/20/2024 0847)  Discharge to home or other facility with appropriate resources:   Identify barriers to discharge with patient and caregiver   Arrange for needed discharge resources and transportation as appropriate   Identify discharge learning needs (meds, wound care, etc)     Problem: Pain  Goal: Verbalizes/displays adequate comfort level or baseline comfort level  3/20/2024 1107 by Kaya Nick RN  Outcome: Progressing  Flowsheets (Taken 3/20/2024 0847)  Verbalizes/displays adequate comfort level or baseline comfort level:   Encourage patient to monitor pain and request assistance   Assess pain using appropriate pain scale   Implement non-pharmacological measures as appropriate and evaluate response   Administer analgesics based on type and severity of pain and evaluate response     Problem: Skin/Tissue Integrity  Goal: Absence of new skin breakdown  Description: 1.  Monitor for areas of redness and/or skin breakdown  2.  Assess vascular access sites hourly  3.  Every 4-6 hours minimum:  Change oxygen saturation probe site  4.  Every 4-6 hours:  If on nasal continuous positive airway pressure, respiratory therapy assess nares and determine need for appliance change or resting period.  3/20/2024 1107 by Kaya Nick RN  Outcome: Progressing     Problem: Safety - Adult  Goal: Free from fall injury  3/20/2024 1107 by Kaya Nick RN  Outcome: Progressing  Falling star prevention in place. Bed and chair alarms in use. Call light in reach. Purposeful hourly rounding.    Problem: Chronic Conditions and Co-morbidities  Goal: Patient's chronic conditions and co-morbidity symptoms are monitored and maintained or improved  3/20/2024 1107 by Kaya Nick RN  Outcome: Progressing  Flowsheets (Taken 3/20/2024

## 2024-03-20 NOTE — PROGRESS NOTES
Comprehensive Nutrition Assessment    Type and Reason for Visit: Reassess    Nutrition Recommendations/Plan:   Continue diet as ordered.   Continue Magic Cup with lunch and Ensure Compact with breakfast and dinner.  Consider renal MVI.      Malnutrition Assessment:  Malnutrition Status: Moderate malnutrition  Context: Chronic Illness       Findings of the 6 clinical characteristics of malnutrition:  Energy Intake:  75% or less estimated energy requirements for 1 month or longer  Weight Loss:  Unable to assess (pt ESRD on HD - fluctuates frequently)     Body Fat Loss:  Mild body fat loss Orbital, Triceps, Fat Overlying Ribs   Muscle Mass Loss:   (moderate losses) Temples (temporalis), Clavicles (pectoralis & deltoids), Scapula (trapezius)  Fluid Accumulation:  Mild Extremities   Strength:  Not Performed    Nutrition Assessment:    Pt. nutritionally compromised AEB wounds.  At risk for further nutrition compromise r/t increased nutrient needs for wound healing, underlying medical condition (anemia in CKD, CHF, CVA in 1990s, HLD, HTN).     Nutrition Related Findings:    Patient/ Family Comments: Per patient her appetite has been good recently. She is trying to eat/ drink the ONS that is on her tray.   Appetite: GOOD  Edema: none,per flowsheet  Skin Integrity: Surgical incision per flowsheet-  (s/p Left Misael Hip Arthroplasty on 3/11/24)     GI Function: LBM 03/18/24 per flowsheet  - On bowel regimen: yes  Labs:   Recent Labs     03/20/24  0705      K 3.9   CL 99   GLUCOSE 75   BUN 42*   CREATININE 6.0*     Medications: pantoprazole, statin, folic acid, senna    Current Nutrition Intake & Therapies:    Recent PO intake: 26-50%, 51-75%  Recent Supplement Intake: 26-50%, 51-75%    - Current intake likely meets estimated needs.   ADULT DIET; Regular  ADULT ORAL NUTRITION SUPPLEMENT; Lunch; Frozen Oral Supplement  ADULT ORAL NUTRITION SUPPLEMENT; Dinner, Breakfast; Standard 4 oz Oral Supplement    Anthropometric  Other (Free Text): Discussed [employer] advised $250 for 2 treatments, provided handouts Detail Level: Detailed Note Text (......Xxx Chief Complaint.): This diagnosis correlates with the

## 2024-03-20 NOTE — PROGRESS NOTES
Independent  Transfer Assistance: Independent    Active : No  Patient's  Info: Pt can drive, but  does all the driving     Additional Comments: Patient reports she walks with a rolling walker PTA. Patient was on IPR in Dec 2023 for 2 weeks after falling and breaking L2 vertebra.  very supportive and able to assist at discharge.  takes patient to dialysis M-W-F.    SUBJECTIVE: Pt was laying supine in bed upon arrival. Pt was agreeable throughout OT treatment. Pt required encouragement throughout.     PAIN: 10/10 when standing; did not rate while sitting. Pt demonstrated low pain tolerance.     Vitals: Vitals not assessed per clinical judgement, see nursing flowsheet    COGNITION: Slow Processing and Decreased Problem Solving    ADL:   Bathing: Pt was Mod A to participate in EOB sponge bath; required mod verbal cues for sequencing of bathing; pt required cleaning below knees for hip precautions and behind area. Pt given long handled sponge and educated about use; tolerated fairly.  Upper Extremity Dressing: Contact Guard Assistance. Pt was able to don/doff UE while sitting EOB.   Lower Extremity Dressing: Moderate Assistance. Pt stood to doff LE clothing to knee level; sat down to pull down the rest of LE clothing. Pt was educated on use of reacher to tulio LE clothing on. Pt required encouragement to utilize the reacher. Pt was able to tulio brief but required assistance to tulio pants.   Footwear Management: Minimal Assistance. Pt was educated on how to utilize a sock aid; required encouragement to try utilizing aid. Pt required demonstration for 1 sock and required max verbal cues to correctly place left sock onto foot with sock aid.     Increased time required for all ADLs because of pain and extra encouragement.   IADL:   Not Tested    BALANCE:  Sitting Balance:  Stand By Assistance.    Standing Balance: Contact Guard Assistance.      BED MOBILITY:  Rolling to Left: Maximum  Assistance from supine to sit and then sit to supine; educated on hip precautions during bed mobility.     TRANSFERS:  Sit to Stand:  Contact Guard Assistance. From bed  Stand to Sit: Contact Guard Assistance.      FUNCTIONAL MOBILITY:  Assistive Device: Rolling Walker  Assist Level:  Contact Guard Assistance.   Distance:  From EOB to recliner chair          Modified Carolyn Scale:  Not Applicable    ASSESSMENT:     Activity Tolerance:  Patient tolerance of  treatment: Fair treatment tolerance; limited by fatigue and pain      Discharge Recommendations: Home with Home Health OT  Equipment Recommendations: Other: Pt has a shower chair for her tub/shower- monitor for tub transfer bench and elevated toilet seat  Plan: Times Per Week: 5x/wk for 60 min  Current Treatment Recommendations: Strengthening, Balance training, Functional mobility training, Endurance training, Safety education & training, Self-Care / ADL, Home management training, Patient/Caregiver education & training, Equipment evaluation, education, & procurement, Coordination training    Education:  Learners: Patient  ADL's and Assistive Device Safety    Goals  Short Term Goals  Time Frame for Short Term Goals: 1 week  Short Term Goal 1: Pt will complete LB dressing with LHAE and CGA to increase indep in home environment while maintaining hip precautions.  Short Term Goal 2: Pt will complete functional standing task x 3 minutes with 1 UE release and SBA to increase indep and endurance with all sinkside grooming and clothing management.  Short Term Goal 3: Pt will navigate to/from BR with SBA and min vcs for safety to increase indep and endurance with all toileting.  Short Term Goal 4: Pt will perform full body bathing with min A and AE PRN to increase indep in home environment.  Short Term Goal 5: Pt will demonstrate 3/3 hip precautions during ADL routine with min vcs to improve compliance with hip precautions.  Long Term Goals  Time Frame for Long Term Goals

## 2024-03-20 NOTE — PROGRESS NOTES
Physical Medicine & Rehabilitation Progress Note    Chief Complaint:  Left hip pain due to left hip fracture requiring left hip hemiarthroplasty surgery    Subjective:    Deloris Watts is a 68 y.o. right-handed  female with history of hypertension, hyperlipidemia, hydronephrosis, fibromyalgia, irritable bowel syndrome, osteoporosis, end-stage renal disease on hemodialysis (MWF) since January 2023, stroke with left side weakness in 1990s, anemia, depression, anxiety, right wrist and right ankle fracture treated conservatively, right proximal humeral fracture due to fall requiring ORIF, status post right carpal tunnel release surgery, status post cervical spine surgery, status post hysterectomy and bilateral oophorectomy, hemorrhoidectomy, sinus surgery, tubal ligation, L2 compression fracture requiring kyphoplasty, LASEK surgery, was admitted on 3/14/2024 for intensive inpatient management of impairment & disability secondary to displaced left femur neck fracture as result of fall on 3/10/2024 requiring left hip hemiarthroplasty surgery.     The patient was previously in inpatient rehab service from 12/21/2023 to 1/5/2024 for intensive inpatient management of impairment & disability secondary to fall accident on 12/14/2023 resulting L2 compression fracture requiring kyphoplasty, and liver laceration.  She was discharged to home on 1/5/2024 with referral for outpatient PT, OT and speech therapy.     The patient says while she was pulling up her pant after using toilet in her home's bathroom on 3/10/2024, she suddenly lost balance and fell to the ground hitting her left hip.  She thinks she also hit her head to something at the time.  She did not loss consciousness.  She immediately feels severe pain at her left hip and was unable to get up even with her 's assistance.  Therefore 911 was called and the patient was sent to [facility] ER by EMS.  Multiple x-ray and CAT scan were done.

## 2024-03-20 NOTE — FLOWSHEET NOTE
03/20/24 1151 03/20/24 1440   Vital Signs   /79 (!) 155/77   Temp 97.1 °F (36.2 °C) 97.1 °F (36.2 °C)   Pulse 77 75   Respirations 20 20   SpO2  --  96 %   Weight - Scale 43.3 kg (95 lb 7.4 oz) 42.3 kg (93 lb 4.1 oz)   Weight Method  --  Bed scale   Percent Weight Change -7.08 -2.31   Post-Hemodialysis Assessment   Post-Treatment Procedures  --  Blood returned;Catheter Capped, clamped with Saline x2 ports   Machine Disinfection Process  --  Acid/Vinegar Clean;Heat Disinfect;Exterior Machine Disinfection   Blood Volume Processed (Liters)  --  58.3 L   Dialyzer Clearance  --  Lightly streaked   Duration of Treatment (minutes)  --  150 minutes   Heparin Amount Administered During Treatment (mL)  --  0 mL   Hemodialysis Intake (ml)  --  400 ml   Hemodialysis Output (ml)  --  1400 ml   NET Removed (ml)  --  1000     2.5 hour treatment completed. 1L of fluid removed. Cath flushed with 0.9 NS, clamped and tego secured. Report given to primary RN. Charting printed and placed in bin to be scanned into EMR.

## 2024-03-20 NOTE — PROGRESS NOTES
[facility]  INPATIENT PHYSICAL THERAPY  [facility] - 8K-17/017-A  Daily Note    Time In: 0900  Time Out: 1030  Timed Code Treatment Minutes: 90 Minutes  Minutes: 90          Date: 3/20/2024  Patient Name: Deloris Watts,  Gender:  female        MRN: [id number]  : 1955  (68 y.o.)  Referral Date : 24  Referring Practitioner: Jax Whatley MD  Diagnosis: Closed fracture of neck of left femur, initial encounter  Additional Pertinent Hx: Per H&P:Deloris Watts  is a 68 y.o. right-handed  female with history of hypertension, hyperlipidemia, hydronephrosis, fibromyalgia, irritable bowel syndrome, osteoporosis, end-stage renal disease on hemodialysis (MWF) since 2023, stroke with left side weakness in , anemia, depression, anxiety, right wrist and right ankle fracture treated conservatively, right proximal humeral fracture due to fall requiring ORIF, status post right carpal tunnel release surgery, status post cervical spine surgery, status post hysterectomy and bilateral oophorectomy, hemorrhoidectomy, sinus surgery, tubal ligation, L2 compression fracture requiring kyphoplasty, LASEK surgery, is admitted to the inpatient rehabilitation unit on 3/14/2024 for intensive inpatient rehabilitation treatment impairment and disability secondary to displaced left femur neck fracture as result of fall on 3/10/2024 requiring left hip hemiarthroplasty surgery.The patient says while she was pulling up her pant after using toilet in her home's bathroom on 3/10/2024, she suddenly lost balance and fell to the ground hitting her left hip.  She thinks she also hit her head to something at the time.  She did not loss consciousness.  She immediately feels severe pain at her left hip and was unable to get up even with her 's assistance.  Therefore 911 was called and the patient was sent to [facility] ER by EMS.  Multiple x-ray and CAT  precautions in place.

## 2024-03-20 NOTE — PROGRESS NOTES
Patient: Deloris Watts  Unit/Bed: 8K-17/017-A  YOB: 1955  MRN: [id number] Acct: [id number]   Admitting Diagnosis: Closed fracture of neck of left femur, initial encounter ([facility]) [S72.002A]  Admit Date:  3/14/2024  Hospital Day: 6    Assessment:     Principal Problem:    Closed fracture of neck of left femur, initial encounter ([facility])  Active Problems:    Depression    Anxiety disorder    History of CVA (cerebrovascular accident)    Hyperlipidemia    Allergic rhinitis    Essential hypertension    Generalized anxiety disorder    ESRD on hemodialysis ([facility])    Chronic constipation  Resolved Problems:    * No resolved hospital problems. *      Plan:     Continue to follow        Subjective:     Patient has no complaint of CP or SOB..   Medication side effects: none    Scheduled Meds:   pantoprazole  40 mg Oral QAM AC    diclofenac sodium  2 g Topical BID    traZODone  100 mg Oral Nightly    amLODIPine  5 mg Oral Daily    atorvastatin  10 mg Oral Daily    buPROPion  300 mg Oral Daily    clopidogrel  75 mg Oral Daily    docusate sodium  1 enema Rectal Daily    folic acid  500 mcg Oral Daily    heparin (porcine)  5,000 Units SubCUTAneous BID    montelukast  10 mg Oral Daily    naloxegol  12.5 mg Oral QAM AC    senna  2 tablet Oral Nightly    sevelamer  800 mg Oral Lunch    docusate sodium  100 mg Oral BID    melatonin  6 mg Oral Nightly     Continuous Infusions:   sodium chloride      dextrose       PRN Meds:bisacodyl, sodium chloride, dextrose, dextrose bolus **OR** dextrose bolus, docusate sodium, glucagon (rDNA), glucose, HYDROcodone 5 mg - acetaminophen **OR** HYDROcodone 5 mg - acetaminophen, linaclotide, magnesium hydroxide, ondansetron, polyethylene glycol, sodium chloride flush, acetaminophen, diclofenac sodium    Review of Systems  Pertinent items are noted in HPI.    Objective:     Patient Vitals for the past 8 hrs:   BP Temp Temp src Pulse Resp SpO2 Weight   03/20/24 0847 (!) 150/66 97.7 °F (36.5

## 2024-03-20 NOTE — PROGRESS NOTES
[facility]  Recreational Therapy  Daily Note  [facility]    Time Spent with Patient: 10 minutes    Date:  3/20/2024       Patient Name: Deloris Watts      MRN: [id number]      YOB: 1955 (68 y.o.)       Gender: female  Diagnosis: Closed fracture of neck of left femur, initial encounter  Referring Practitioner: Jax Whatley MD    RESTRICTIONS/PRECAUTIONS:  Restrictions/Precautions: Weight Bearing, Fall Risk     Hearing: Within functional limits    PAIN: 0    SUBJECTIVE:  I would like that     OBJECTIVE:  Pt would like to get her hair washed and styled by our beautician tomorrow-states she would like that-appreciative         Patient Education  New Education Provided: Importance of Leisure,     Electronically signed by: Elena Tan, CTRS  Date: 3/20/2024

## 2024-03-21 ENCOUNTER — APPOINTMENT (OUTPATIENT)
Dept: GENERAL RADIOLOGY | Age: 69
End: 2024-03-21
Attending: PHYSICAL MEDICINE & REHABILITATION
Payer: MEDICARE

## 2024-03-21 LAB
ANION GAP SERPL CALC-SCNC: 20 MEQ/L (ref 8–16)
BUN SERPL-MCNC: 25 MG/DL (ref 7–22)
CALCIUM SERPL-MCNC: 9 MG/DL (ref 8.5–10.5)
CHLORIDE SERPL-SCNC: 100 MEQ/L (ref 98–111)
CO2 SERPL-SCNC: 24 MEQ/L (ref 23–33)
CREAT SERPL-MCNC: 4.1 MG/DL (ref 0.4–1.2)
GFR SERPL CREATININE-BSD FRML MDRD: 11 ML/MIN/1.73M2
GLUCOSE SERPL-MCNC: 82 MG/DL (ref 70–108)
POTASSIUM SERPL-SCNC: 4.6 MEQ/L (ref 3.5–5.2)
SODIUM SERPL-SCNC: 144 MEQ/L (ref 135–145)

## 2024-03-21 PROCEDURE — 6360000002 HC RX W HCPCS: Performed by: PHYSICAL MEDICINE & REHABILITATION

## 2024-03-21 PROCEDURE — 97530 THERAPEUTIC ACTIVITIES: CPT

## 2024-03-21 PROCEDURE — 80048 BASIC METABOLIC PNL TOTAL CA: CPT

## 2024-03-21 PROCEDURE — 73502 X-RAY EXAM HIP UNI 2-3 VIEWS: CPT

## 2024-03-21 PROCEDURE — 97112 NEUROMUSCULAR REEDUCATION: CPT

## 2024-03-21 PROCEDURE — 99232 SBSQ HOSP IP/OBS MODERATE 35: CPT | Performed by: INTERNAL MEDICINE

## 2024-03-21 PROCEDURE — 1180000000 HC REHAB R&B

## 2024-03-21 PROCEDURE — 97116 GAIT TRAINING THERAPY: CPT

## 2024-03-21 PROCEDURE — 97110 THERAPEUTIC EXERCISES: CPT

## 2024-03-21 PROCEDURE — 6370000000 HC RX 637 (ALT 250 FOR IP): Performed by: PHYSICAL MEDICINE & REHABILITATION

## 2024-03-21 PROCEDURE — 97130 THER IVNTJ EA ADDL 15 MIN: CPT

## 2024-03-21 PROCEDURE — 97535 SELF CARE MNGMENT TRAINING: CPT

## 2024-03-21 PROCEDURE — 36415 COLL VENOUS BLD VENIPUNCTURE: CPT

## 2024-03-21 PROCEDURE — 97129 THER IVNTJ 1ST 15 MIN: CPT

## 2024-03-21 PROCEDURE — 99232 SBSQ HOSP IP/OBS MODERATE 35: CPT | Performed by: PHYSICAL MEDICINE & REHABILITATION

## 2024-03-21 RX ADMIN — HEPARIN SODIUM 5000 UNITS: 5000 INJECTION INTRAVENOUS; SUBCUTANEOUS at 10:32

## 2024-03-21 RX ADMIN — BUPROPION HYDROCHLORIDE 300 MG: 300 TABLET, FILM COATED, EXTENDED RELEASE ORAL at 10:32

## 2024-03-21 RX ADMIN — POLYETHYLENE GLYCOL 3350 17 G: 17 POWDER, FOR SOLUTION ORAL at 10:32

## 2024-03-21 RX ADMIN — HEPARIN SODIUM 5000 UNITS: 5000 INJECTION INTRAVENOUS; SUBCUTANEOUS at 20:42

## 2024-03-21 RX ADMIN — AMLODIPINE BESYLATE 5 MG: 5 TABLET ORAL at 10:32

## 2024-03-21 RX ADMIN — HYDROCODONE BITARTRATE AND ACETAMINOPHEN 2 TABLET: 5; 325 TABLET ORAL at 07:32

## 2024-03-21 RX ADMIN — ONDANSETRON 4 MG: 4 TABLET, ORALLY DISINTEGRATING ORAL at 14:20

## 2024-03-21 RX ADMIN — FOLIC ACID 500 MCG: 1 TABLET ORAL at 10:32

## 2024-03-21 RX ADMIN — SEVELAMER CARBONATE 800 MG: 800 TABLET, FILM COATED ORAL at 13:30

## 2024-03-21 RX ADMIN — TRAZODONE HYDROCHLORIDE 100 MG: 100 TABLET ORAL at 22:06

## 2024-03-21 RX ADMIN — PANTOPRAZOLE SODIUM 40 MG: 40 TABLET, DELAYED RELEASE ORAL at 06:29

## 2024-03-21 RX ADMIN — Medication 6 MG: at 20:42

## 2024-03-21 RX ADMIN — MONTELUKAST SODIUM 10 MG: 10 TABLET ORAL at 10:32

## 2024-03-21 RX ADMIN — SENNOSIDES 17.2 MG: 8.6 TABLET, FILM COATED ORAL at 20:41

## 2024-03-21 RX ADMIN — CLOPIDOGREL BISULFATE 75 MG: 75 TABLET ORAL at 10:32

## 2024-03-21 RX ADMIN — DOCUSATE SODIUM 283 MG: 283 LIQUID RECTAL at 10:32

## 2024-03-21 RX ADMIN — HYDROCODONE BITARTRATE AND ACETAMINOPHEN 2 TABLET: 5; 325 TABLET ORAL at 13:30

## 2024-03-21 RX ADMIN — ATORVASTATIN CALCIUM 10 MG: 10 TABLET, FILM COATED ORAL at 10:32

## 2024-03-21 RX ADMIN — DOCUSATE SODIUM 100 MG: 100 CAPSULE, LIQUID FILLED ORAL at 10:32

## 2024-03-21 RX ADMIN — DOCUSATE SODIUM 100 MG: 100 CAPSULE, LIQUID FILLED ORAL at 20:42

## 2024-03-21 RX ADMIN — NALOXEGOL OXALATE 12.5 MG: 12.5 TABLET, FILM COATED ORAL at 06:29

## 2024-03-21 ASSESSMENT — PAIN DESCRIPTION - ORIENTATION
ORIENTATION: LEFT
ORIENTATION: LEFT

## 2024-03-21 ASSESSMENT — PAIN - FUNCTIONAL ASSESSMENT
PAIN_FUNCTIONAL_ASSESSMENT: ACTIVITIES ARE NOT PREVENTED

## 2024-03-21 ASSESSMENT — PAIN SCALES - GENERAL
PAINLEVEL_OUTOF10: 8
PAINLEVEL_OUTOF10: 0
PAINLEVEL_OUTOF10: 10
PAINLEVEL_OUTOF10: 8

## 2024-03-21 ASSESSMENT — PAIN DESCRIPTION - LOCATION
LOCATION: LEG;KNEE
LOCATION: HIP
LOCATION: LEG;HIP

## 2024-03-21 ASSESSMENT — PAIN DESCRIPTION - DESCRIPTORS: DESCRIPTORS: ACHING;SORE;TENDER

## 2024-03-21 ASSESSMENT — PAIN DESCRIPTION - PAIN TYPE: TYPE: ACUTE PAIN;SURGICAL PAIN

## 2024-03-21 NOTE — PROGRESS NOTES
Appointments to Monthly Calendar (with initial date given):              Remaining Dates: independent               Month: mod cue              Appointment 1: independent              Appointment 2: independent              Appointment 3: independent              Appointment 4: independent     Good general sustained attention throughout tasks; increased participation from previous sessions.     Acute Concussion Evaluation (ACE): 10/22  Physical Symptoms: 2/10  Cognitive Symptoms: 3/4  Emotional Symptoms: 4/4  Sleep Symptoms: 1/4     Acute Concussion Evaluation (ACE) completed this date following admission with documented concern for CHI during mechanical fall. ACE score of 10/22 calculated; It should be noted that a score with concerns for concussion are greater than 12/22. Score in previous date of 12/22. Continuation of low stimulation guidelines.       Review of Systems:  Review of Systems   Constitutional:  Positive for appetite change and fatigue. Negative for chills, diaphoresis and fever.   HENT:  Negative for hearing loss, rhinorrhea, sneezing, sore throat and trouble swallowing.    Eyes:  Negative for visual disturbance.   Respiratory:  Negative for cough, shortness of breath and wheezing.    Cardiovascular:  Negative for chest pain and palpitations.   Gastrointestinal:  Negative for abdominal pain, constipation, diarrhea, nausea and vomiting.   Genitourinary:  Positive for decreased urine volume. Negative for dysuria.   Musculoskeletal:  Positive for arthralgias (Left hip) and gait problem. Negative for back pain and neck pain.   Skin:  Negative for rash.   Neurological:  Positive for weakness (Left lower extremity). Negative for dizziness, tremors, speech difficulty, light-headedness, numbness and headaches.   Psychiatric/Behavioral:  Negative for dysphoric mood, hallucinations and sleep disturbance. The patient is not nervous/anxious.         Objective:  /86   Pulse 78   Temp 97.5 °F (36.4 °C)  She still has significant weakness at her left hip.  She has poor appetite.  We can try Megace as appetite stimulant.  She is scheduled to have hemodialysis today.  She tolerated the rehab treatment provided well.  Her function is improving very slowly.    The patient is projected to be ready to be discharged on 3/30/2024.    Plan:  Continue intensive PT/OT/SLP/RT inpatient rehabilitation program at least 3 hours per day, 5 days per week in order to improve functional status prior to discharge.  Family education and training will be completed.  Equipment evaluations and recommendations will be completed as appropriate.       Rehabilitation nursing continue to be involved for bowel, bladder, skin, and pain management.  Nursing will also provide education and training to patient and family.    Prophylaxis:  DVT: Subcutaneous heparin, ACE stocking, intermittent pneumatic compression device.  GI: Colace, Senokot, Enemeez enema, Movantik, Dulcolax suppository as needed, milk of magnesium as needed, GlycoLax as needed, Linzess as needed  Pain: Tylenol as needed, Norco 5/325 1-2 tab as needed, Voltaren gel as needed; add Voltaren gel application to right shoulder for right shoulder pain  Continue Norvasc for hypertension  Continue Lipitor for hyperlipidemia  Continue Wellbutrin XL for depression  Continue Singulair for allergic rhinitis  Continue Plavix for secondary stroke prevention  Continue sevelamer for hypocalcemia prevention in renal patient  Continue folic acid supplement  Start Megace for poor appetite  Continues Protonix for possible GERD  Continue melatonin, trazodone for insomnia  Continue hemodialysis Monday, Wednesday and Friday under the direction of nephrology service  Nutrition: Continue current regular diet  Bladder: Monitoring signs or symptoms of UTI  Bowel: Monitoring signs or symptoms of constipation   and case management for coordination of care and discharge planning      Missed

## 2024-03-21 NOTE — PROGRESS NOTES
Patient: Deloris Watts  Unit/Bed: 8K-17/017-A  YOB: 1955  MRN: [id number] Acct: [id number]   Admitting Diagnosis: Closed fracture of neck of left femur, initial encounter ([facility]) [S72.002A]  Admit Date:  3/14/2024  Hospital Day: 7    Assessment:     Principal Problem:    Closed fracture of neck of left femur, initial encounter ([facility])  Active Problems:    Depression    Anxiety disorder    History of CVA (cerebrovascular accident)    Hyperlipidemia    Allergic rhinitis    Essential hypertension    Generalized anxiety disorder    ESRD on hemodialysis ([facility])    Chronic constipation  Resolved Problems:    * No resolved hospital problems. *      Plan:     Continue to follow        Subjective:     Patient has no complaint of CP or SOB..   Medication side effects: none    Scheduled Meds:   pantoprazole  40 mg Oral QAM AC    diclofenac sodium  2 g Topical BID    traZODone  100 mg Oral Nightly    amLODIPine  5 mg Oral Daily    atorvastatin  10 mg Oral Daily    buPROPion  300 mg Oral Daily    clopidogrel  75 mg Oral Daily    docusate sodium  1 enema Rectal Daily    folic acid  500 mcg Oral Daily    heparin (porcine)  5,000 Units SubCUTAneous BID    montelukast  10 mg Oral Daily    naloxegol  12.5 mg Oral QAM AC    senna  2 tablet Oral Nightly    sevelamer  800 mg Oral Lunch    docusate sodium  100 mg Oral BID    melatonin  6 mg Oral Nightly     Continuous Infusions:   sodium chloride      dextrose       PRN Meds:bisacodyl, sodium chloride, dextrose, dextrose bolus **OR** dextrose bolus, docusate sodium, glucagon (rDNA), glucose, HYDROcodone 5 mg - acetaminophen **OR** HYDROcodone 5 mg - acetaminophen, linaclotide, magnesium hydroxide, ondansetron, polyethylene glycol, sodium chloride flush, acetaminophen, diclofenac sodium    Review of Systems  Pertinent items are noted in HPI.    Objective:     Patient Vitals for the past 8 hrs:   BP Temp Temp src Pulse Resp SpO2 Weight   03/21/24 0802 -- -- -- -- 17 -- --    03/21/24 0801 124/67 97.9 °F (36.6 °C) Oral 82 17 96 % --   03/21/24 0732 -- -- -- -- 17 -- --   03/21/24 0600 -- -- -- -- -- -- 42.6 kg (93 lb 14.7 oz)     I/O last 3 completed shifts:  In: 1480 [P.O.:1080]  Out: 1400   No intake/output data recorded.    /67   Pulse 82   Temp 97.9 °F (36.6 °C) (Oral)   Resp 17   Ht 1.524 m (5')   Wt 42.6 kg (93 lb 14.7 oz)   SpO2 96%   BMI 18.34 kg/m²     General appearance: alert, appears stated age, and cooperative  Head: Normocephalic, without obvious abnormality, atraumatic  Lungs: clear to auscultation bilaterally  Heart: regular rate and rhythm, S1, S2 normal, no murmur, click, rub or gallop  Abdomen: soft, non-tender; bowel sounds normal; no masses,  no organomegaly  Extremities: extremities normal, atraumatic, no cyanosis or edema  Skin: Skin color, texture, turgor normal. No rashes or lesions  Neurologic:  weak    Electronically signed by Gareth Mosquera MD on 3/21/2024 at 9:41 AM

## 2024-03-21 NOTE — PROGRESS NOTES
[facility]  INPATIENT PHYSICAL THERAPY  [facility] - 8K-17/017-A  Daily Note    Time In: 1330  Time Out: 1400  Timed Code Treatment Minutes: 30 Minutes  Minutes: 30          Date: 3/21/2024  Patient Name: Deloris Watts,  Gender:  female        MRN: [id number]  : 1955  (68 y.o.)  Referral Date : 24  Referring Practitioner: Jax Whatley MD  Diagnosis: Closed fracture of neck of left femur, initial encounter  Additional Pertinent Hx: Per H&P:Deloris Watts  is a 68 y.o. right-handed  female with history of hypertension, hyperlipidemia, hydronephrosis, fibromyalgia, irritable bowel syndrome, osteoporosis, end-stage renal disease on hemodialysis (MWF) since 2023, stroke with left side weakness in , anemia, depression, anxiety, right wrist and right ankle fracture treated conservatively, right proximal humeral fracture due to fall requiring ORIF, status post right carpal tunnel release surgery, status post cervical spine surgery, status post hysterectomy and bilateral oophorectomy, hemorrhoidectomy, sinus surgery, tubal ligation, L2 compression fracture requiring kyphoplasty, LASEK surgery, is admitted to the inpatient rehabilitation unit on 3/14/2024 for intensive inpatient rehabilitation treatment impairment and disability secondary to displaced left femur neck fracture as result of fall on 3/10/2024 requiring left hip hemiarthroplasty surgery.The patient says while she was pulling up her pant after using toilet in her home's bathroom on 3/10/2024, she suddenly lost balance and fell to the ground hitting her left hip.  She thinks she also hit her head to something at the time.  She did not loss consciousness.  She immediately feels severe pain at her left hip and was unable to get up even with her 's assistance.  Therefore 911 was called and the patient was sent to [facility] ER by EMS.  Multiple x-ray and CAT

## 2024-03-21 NOTE — PROGRESS NOTES
[facility]  Recreational Therapy  Daily Note  [facility]    Time Spent with Patient: 25 minutes    Date:  3/21/2024       Patient Name: Deloris Watts      MRN: [id number]      YOB: 1955 (68 y.o.)       Gender: female  Diagnosis: Closed fracture of neck of left femur, initial encounter  Referring Practitioner: Jax Whatley MD    RESTRICTIONS/PRECAUTIONS:  Restrictions/Precautions: Weight Bearing, Fall Risk     Hearing: Within functional limits    PAIN: 7    SUBJECTIVE:  I look good again    OBJECTIVE:  Pt enjoyed getting her hair washed and styled by our beautician-she was in a lot of pain but able to manage it well-appreciative -states she is on her phone a lot and watches a lot of tv-pleasant        Patient Education  New Education Provided: Importance of Leisure,     Electronically signed by: Elena Tan CTRS  Date: 3/21/2024

## 2024-03-21 NOTE — PROGRESS NOTES
[facility]  INPATIENT PHYSICAL THERAPY  [facility] - 8K-17/017-A  Daily Note    Time In: 0700  Time Out: 0730  Timed Code Treatment Minutes: 30 Minutes  Minutes: 30          Date: 3/21/2024  Patient Name: Deloris Watts,  Gender:  female        MRN: [id number]  : 1955  (68 y.o.)  Referral Date : 24  Referring Practitioner: Jax Whatley MD  Diagnosis: Closed fracture of neck of left femur, initial encounter  Additional Pertinent Hx: Per H&P:Deloris Watts  is a 68 y.o. right-handed  female with history of hypertension, hyperlipidemia, hydronephrosis, fibromyalgia, irritable bowel syndrome, osteoporosis, end-stage renal disease on hemodialysis (MWF) since 2023, stroke with left side weakness in , anemia, depression, anxiety, right wrist and right ankle fracture treated conservatively, right proximal humeral fracture due to fall requiring ORIF, status post right carpal tunnel release surgery, status post cervical spine surgery, status post hysterectomy and bilateral oophorectomy, hemorrhoidectomy, sinus surgery, tubal ligation, L2 compression fracture requiring kyphoplasty, LASEK surgery, is admitted to the inpatient rehabilitation unit on 3/14/2024 for intensive inpatient rehabilitation treatment impairment and disability secondary to displaced left femur neck fracture as result of fall on 3/10/2024 requiring left hip hemiarthroplasty surgery.The patient says while she was pulling up her pant after using toilet in her home's bathroom on 3/10/2024, she suddenly lost balance and fell to the ground hitting her left hip.  She thinks she also hit her head to something at the time.  She did not loss consciousness.  She immediately feels severe pain at her left hip and was unable to get up even with her 's assistance.  Therefore 911 was called and the patient was sent to [facility] ER by EMS.  Multiple x-ray and CAT  M-W-F.    Restrictions/Precautions:  Restrictions/Precautions: Weight Bearing, Fall Risk  Left Lower Extremity Weight Bearing: Weight Bearing As Tolerated  Position Activity Restriction  Hip Precautions: No hip flexion > 90 degrees, No ADduction, No hip internal rotation, Posterior hip precautions  Other position/activity restrictions: M-W-F Dialysis     SUBJECTIVE: Patient laying in bed upon arrival. Patient pleasant and agreeable to therapy. Patient reporting 10/10 pain and PT notified RN. Patient requesting to use bedside commode at beginning of session.     Pain: 10/10: L hip     Vitals:   Patient Vitals for the past 8 hrs:   BP Patient Position Temp Temp src Pulse Resp SpO2 O2 Device   03/21/24 0802 -- -- -- -- -- 17 -- --   03/21/24 0801 124/67 Sitting 97.9 °F (36.6 °C) Oral 82 17 96 % None (Room air)   03/21/24 0732 -- -- -- -- -- 17 -- --     *Vitals may reflect data entered into the flowsheet prior to or after PT session was completed.      OBJECTIVE:  Bed Mobility:  Supine to Sit: Moderate Assistance  *Patient required assistance at LLE and trunk with increased time required to complete     Transfers:  Sit to Stand:Contact Guard Assistance  Stand to Sit:Contact Guard Assistance  *Patient instructed in sit<>stand transfers from various surfaces including: edge of bed, recliner and bedside commode with use of RW. Patient required cueing for attempting to perform with equalized weight bearing.     Ambulation:  Contact Guard Assistance  Distance: 5 feet and 20 feet   Surface: Level Tile  Device: Rolling Walker  Gait Deviations:  Forward Flexed Posture, Slow Anna, Decreased Step Length Bilaterally, Decreased Weight Shift Left, Decreased Gait Speed, Decreased Heel Strike Bilaterally, Decreased Quad Control Left, Decreased Foot Clearance Right, Decreased Foot Clearance Left, Unsteady Gait, Decreased Terminal Knee Extension, and Increased reliance on walker    Stairs:  None    Balance:  Dynamic Standing

## 2024-03-21 NOTE — PROGRESS NOTES
Pt has been resting in bed through out the night. She did complain of pain at bedtime and took a PRN pain medication with good effect.  She had no other complaints of pain or discomfort.

## 2024-03-21 NOTE — PROGRESS NOTES
[facility]  INPATIENT SPEECH THERAPY  [facility]  DAILY NOTE    TIME   SLP Individual Minutes  Time In: 835  Time Out: 905  Minutes: 30  Timed Code Treatment Minutes: 30 Minutes     Date: 3/21/2024  Patient Name: Deloris Watts      CSN: [id number]   : 1955  (68 y.o.)  Gender: female   Referring Physician:  Dr. Jax Whatley  Diagnosis: Closed Fracture of Neck of Left Femur   Precautions: fall risk  Current Diet: Regular solids. Thin liquids.  Respiratory Status: Room Air  Swallowing Strategies: Standard Universal Swallow Precautions  Date of Last MBS/FEES: Not Applicable    Pain:  No pain reported.    Subjective: Patient sitting in recliner upon ST arrival. Patient participatory and pleasant. No family present.    Short-Term Goals:  SHORT TERM GOAL #1:  Goal 1: Patient will complete delayed recall tasks of 3+ units of information (including Cog-Log) with 75% accuracy, with or without use of compensatory strategies, given mod cues, to improve retention of novel information.  INTERVENTIONS:   Recall of ST Intern (name, cat, ST student)  ~1 Day Delayed Recall: 1/3 independent, 2/3 min cues  *Patient unaware of written visual aid made in previous session; ST prompted use, patient with difficulty reading handwriting; used in 2/2 trials.    Recall of Hip Precautions (no bending, crossing, or twisting)  ~1 Day Delayed Recall: 3/3 independent with written visual aid  *Patient independently aware of precautions on whiteboard.    Novel Recall and Generation of Grocery List (milk, bananas, corn, pizza, shampoo, conditioner):  Immediate Recall: 6/6 independent with written visual aid  Additional ~25 Minutes: 3/6 independent, 3/6 min cues  *ST prompted patient to write information; used in 1/2 trials.    Cog-Lo/30  *Score Previous Date:   *Deficits: repeat address, months reversed, fist-edge-palm, delayed address recall    SHORT TERM GOAL #2:  Goal 2: Patient will  complete functional executive functioning (basic finances, medications), problem solving (time, money) and verbal/visual reasoning tasks with 80% accuracy at given mod cues for potential return to PLOF.   INTERVENTIONS:   Calendar-related Reasoning/Problem Solvin/4 min cue, 1/4 mod cue, 2/4 total assist  *Good visual scanning of calendar.  *Decreased reasoning of more open ended questions (\"Could you extend your vacation? Why or why not?\", \"Could you move your volunteer spot to extend your vacation?\")    SHORT TERM GOAL #3:  Goal 3: Patient will complete thought organization tasks with 70% accuracy given mod cues to improve processing speed and organization during ADL tasks.   INTERVENTIONS:   Adding Dates and Appointments to Monthly Calendar (with initial date given):   Remaining Dates: independent    Month: mod cue   Appointment 1: independent   Appointment 2: independent   Appointment 3: independent   Appointment 4: independent    SHORT TERM GOAL #4:  Goal 4: Patient will complete attention tasks (sustained, selective, alternating, divided) with no more than 5 errors/redirections given mod cues multi-tasking during ADL completion.   INTERVENTIONS: Good general sustained attention throughout tasks; increased participation from previous sessions.    SHORT TERM GOAL #5:  Goal 5: Patient will adhere to modified low stimulation guidelines, including completion of the ACE, with score <8 over 3 consecutive days to maximize neurological healing.   INTERVENTIONS:   Acute Concussion Evaluation (ACE): 10/22  Physical Symptoms: 2/10  Cognitive Symptoms: 3/  Emotional Symptoms: 4/  Sleep Symptoms: 1/4     Acute Concussion Evaluation (ACE) completed this date following admission with documented concern for CHI during mechanical fall. ACE score of 10/22 calculated; It should be noted that a score with concerns for concussion are greater than . Score in previous date of . Continuation of low stimulation

## 2024-03-21 NOTE — PROGRESS NOTES
[facility]  INPATIENT PHYSICAL THERAPY  [facility] - [id number]-17/[id number]  Daily Note    Time In: 0930  Time Out: 1000  Timed Code Treatment Minutes: 30 Minutes  Minutes: 30          Date: 3/21/2024  Patient Name: Deloris Watts,  Gender:  female        MRN: [id number]  : 1955  (68 y.o.)  Referral Date : 24  Referring Practitioner: Jax Whatley MD  Diagnosis: Closed fracture of neck of left femur, initial encounter  Additional Pertinent Hx: Per H&P:Deloris Watts  is a 68 y.o. right-handed  female with history of hypertension, hyperlipidemia, hydronephrosis, fibromyalgia, irritable bowel syndrome, osteoporosis, end-stage renal disease on hemodialysis (MWF) since 2023, stroke with left side weakness in , anemia, depression, anxiety, right wrist and right ankle fracture treated conservatively, right proximal humeral fracture due to fall requiring ORIF, status post right carpal tunnel release surgery, status post cervical spine surgery, status post hysterectomy and bilateral oophorectomy, hemorrhoidectomy, sinus surgery, tubal ligation, L2 compression fracture requiring kyphoplasty, LASEK surgery, is admitted to the inpatient rehabilitation unit on 3/14/2024 for intensive inpatient rehabilitation treatment impairment and disability secondary to displaced left femur neck fracture as result of fall on 3/10/2024 requiring left hip hemiarthroplasty surgery.The patient says while she was pulling up her pant after using toilet in her home's bathroom on 3/10/2024, she suddenly lost balance and fell to the ground hitting her left hip.  She thinks she also hit her head to something at the time.  She did not loss consciousness.  She immediately feels severe pain at her left hip and was unable to get up even with her 's assistance.  Therefore 911 was called and the patient was sent to [facility] ER by EMS.  Multiple x-ray and CAT  transfer in and out of bed with decreased difficulty.  Short Term Goal 2: Patient will complete sit  <> stand with CGA to stand to ambulate with decrease difficulty.  Short Term Goal 3: Patient will ambulate 20' with a RW and CGA to progress towards navigating home safely.  Short Term Goal 4: Patient will ascend/descend 2\" step in parallel bars with min assist to progress towards stair entry into home.  Short Term Goal 5: Patient will propel wheelchair 50' with SBA to increase mobility and independence on inpatient rehab unit  Long Term Goals  Time Frame for Long Term Goals : 3 weeks  Long Term Goal 1: Patient will complete supine < > sit with modified independence to transfer in and out of bed safely.  Long Term Goal 2: Patient will complete sit < > stand with modified independence to stand to ambulate safely.  Long Term Goal 3: Patient will complete bed < > chair transfer with a RW with modified independence to transfer surface to surface safely.  Long Term Goal 4: Patient will ambulate 50' with a RW with SBA to navigate home.  Long Term Goal 5: Patient will complete car transfer with SBA to transfer in and out of vehicle safely.  Additional Goals?: Yes  Long term goal 6: Patient will ascend/descend 1 step with 1 handrail and CGA for home entry.    Following session, patient left in safe position with all fall risk precautions in place.

## 2024-03-21 NOTE — PROGRESS NOTES
[facility]   Nephrology progress note  3/21/2024, 10:16 AM      Pt Name:    Deloris Watts  MRN:     [id number]     YOB: 1955  Admit Date:    3/14/2024  5:39 PM    Chief Complaint: Nephrology following for ESRD on hemodialysis.    Subjective:  Patient seen and examined  No chest pain or shortness of breath  Feels okay.  No other complaints    Objective:  24HR INTAKE/OUTPUT:    Intake/Output Summary (Last 24 hours) at 3/21/2024 1016  Last data filed at 3/20/2024 2005  Gross per 24 hour   Intake 1180 ml   Output 1400 ml   Net -220 ml        Admission weight: 46.7 kg (103 lb)  Wt Readings from Last 3 Encounters:   03/21/24 42.6 kg (93 lb 14.7 oz)   03/13/24 42.7 kg (94 lb 2.2 oz)   02/22/24 47 kg (103 lb 9.6 oz)        Vitals :   Vitals:    03/21/24 0600 03/21/24 0732 03/21/24 0801 03/21/24 0802   BP:   124/67    Pulse:   82    Resp:  17 17 17   Temp:   97.9 °F (36.6 °C)    TempSrc:   Oral    SpO2:   96%    Weight: 42.6 kg (93 lb 14.7 oz)      Height:           Physical examination  General Appearance:  Well developed. No distress  Mouth/Throat:  Oral mucosa moist  Neck:  Supple, no JVD  Lungs:  Breath sounds: clear  Heart::  S1,S2 heard  Abdomen:  Soft, non - tender  Musculoskeletal:  Edema - none    Medications:  Infusion:    sodium chloride      dextrose       Meds:    pantoprazole  40 mg Oral QAM AC    diclofenac sodium  2 g Topical BID    traZODone  100 mg Oral Nightly    amLODIPine  5 mg Oral Daily    atorvastatin  10 mg Oral Daily    buPROPion  300 mg Oral Daily    clopidogrel  75 mg Oral Daily    docusate sodium  1 enema Rectal Daily    folic acid  500 mcg Oral Daily    heparin (porcine)  5,000 Units SubCUTAneous BID    montelukast  10 mg Oral Daily    naloxegol  12.5 mg Oral QAM AC    senna  2 tablet Oral Nightly    sevelamer  800 mg Oral Lunch    docusate sodium  100 mg Oral BID    melatonin  6 mg Oral Nightly       Lab Data :  CBC:   Recent Labs     03/19/24  0615   WBC

## 2024-03-21 NOTE — PROGRESS NOTES
Pt is a 68y.o. female, sitting back in easychair resting and a bit groggy, in 8K-17. Deloris shared that 'they tell me I'm doing good so far, but not to me.' Asked to elaborate on that, she shared that she hasn't felt up to therapy recently but didn't offer much more.  provided active listening, words of encouragement and prayer-met with gratitude by Deloris.     03/21/24 1656   Encounter Summary   Encounter Overview/Reason  Spiritual/Emotional Needs   Service Provided For: Patient   Referral/Consult From: [facility] Children;Spouse   Last Encounter  03/21/24   Complexity of Encounter Low   Begin Time 1304   End Time  1314   Total Time Calculated 10 min   Spiritual/Emotional needs   Type Spiritual Support   Assessment/Intervention/Outcome   Assessment Compromised coping;Peaceful;Impaired resilience;Calm   Intervention Active listening;Prayer (assurance of)/[place];Explored/Affirmed feelings, thoughts, concerns   Outcome Expressed Gratitude;Receptive

## 2024-03-21 NOTE — PLAN OF CARE
Problem: Pain  Goal: Verbalizes/displays adequate comfort level or baseline comfort level  Outcome: Progressing  Reposition frequently to alleviate pain.  Pt is given norco x 2 this shift due to pain remaining 8-10/10, spoke with Dr Whatley regarding her pain to her hip and breakthrough bleeding noted on dressing, XRAY obtained and was normal, TRACED drainage on dressing to monitor amount, dressing is changed by Ortho.     Problem: Skin/Tissue Integrity  Goal: Absence of new skin breakdown  Description: 1.  Monitor for areas of redness and/or skin breakdown  2.  Assess vascular access sites hourly  3.  Every 4-6 hours minimum:  Change oxygen saturation probe site  4.  Every 4-6 hours:  If on nasal continuous positive airway pressure, respiratory therapy assess nares and determine need for appliance change or resting period.  Outcome: Progressing  Skin assessment every shift.  No new areas of skin breakdown      Problem: Chronic Conditions and Co-morbidities  Goal: Patient's chronic conditions and co-morbidity symptoms are monitored and maintained or improved  Outcome: Progressing  Pt is currently stable

## 2024-03-21 NOTE — PROGRESS NOTES
[facility]  Occupational Therapy  Daily Note  Time:   Time In: 1130  Time Out: 1230  Timed Code Treatment Minutes: 60 Minutes  Minutes: 60          Date: 3/21/2024  Patient Name: Deloris Watts,   Gender: female      Room: [facility]17/017-A  MRN: [id number]  : 1955  (68 y.o.)  Referring Practitioner: Jax Whatley MD  Diagnosis: Closed fracture of neck of left femur, initial encounter  Additional Pertinent Hx: Per H&P:Deloris Watts  is a 68 y.o. right-handed  female with history of hypertension, hyperlipidemia, hydronephrosis, fibromyalgia, irritable bowel syndrome, osteoporosis, end-stage renal disease on hemodialysis (MWF) since 2023, stroke with left side weakness in , anemia, depression, anxiety, right wrist and right ankle fracture treated conservatively, right proximal humeral fracture due to fall requiring ORIF, status post right carpal tunnel release surgery, status post cervical spine surgery, status post hysterectomy and bilateral oophorectomy, hemorrhoidectomy, sinus surgery, tubal ligation, L2 compression fracture requiring kyphoplasty, LASEK surgery, is admitted to the inpatient rehabilitation unit on 3/14/2024 for intensive inpatient rehabilitation treatment impairment and disability secondary to displaced left femur neck fracture as result of fall on 3/10/2024 requiring left hip hemiarthroplasty surgery.The patient says while she was pulling up her pant after using toilet in her home's bathroom on 3/10/2024, she suddenly lost balance and fell to the ground hitting her left hip.  She thinks she also hit her head to something at the time.  She did not loss consciousness.  She immediately feels severe pain at her left hip and was unable to get up even with her 's assistance.  Therefore 911 was called and the patient was sent to [facility] ER by EMS.  Multiple x-ray and CAT scan were done.  The  Independent  Transfer Assistance: Independent    Active : No  Patient's  Info: Pt can drive, but  does all the driving     Additional Comments: Patient reports she walks with a rolling walker PTA. Patient was on IPR in Dec 2023 for 2 weeks after falling and breaking L2 vertebra.  very supportive and able to assist at discharge.  takes patient to dialysis M-W-F.    SUBJECTIVE: Pt sleeping in bed on OT arrival. Pt awakens easily and with complaints of nausea throughout session, but no emesis.  Pt agreeable to OT with encouragement.     PAIN: 10/10 when standing; did not rate while sitting. Pt demonstrated low pain tolerance.     Vitals: Vitals not assessed per clinical judgement, see nursing flowsheet    COGNITION: Slow Processing and Decreased Problem Solving    IADL:   Basic Housekeeping: OT engaged patient in basic housekeeping task of retrieving items at graded heights. Patient completed dynamic standing task that facilitated recall and use of reacher, comprehension of hip precautions and walker safety. Patient required SBA to light CGA as she moved through carpeted surface in apartment to utilize reacher for item retrieval at graded heights including floor level.  Pt limited by nausea during the task, only tolerating 3-4 minute standing intervals before asking to sit and rest.   Demo fair tolerance with frequent self initated rest breaks. Standing task completed to challenge endurance and balance required for ADL and IADL skills.     BALANCE:  Sitting Balance:  Stand By Assistance.    Standing Balance: Contact Guard Assistance.      BED MOBILITY:  Supine to sit: moderate assistance from flat bed, reviewed technique with hip precautions     TRANSFERS:  Sit to Stand:  Contact Guard Assistance. From bed  Stand to Sit: Contact Guard Assistance.      FUNCTIONAL MOBILITY:  Assistive Device: Rolling Walker  Assist Level:  Contact Guard Assistance.   Distance: within room to access

## 2024-03-21 NOTE — PLAN OF CARE
Problem: Discharge Planning  Goal: Discharge to home or other facility with appropriate resources  3/20/2024 2253 by Juan Key RN  Outcome: Progressing  Flowsheets (Taken 3/20/2024 2005)  Discharge to home or other facility with appropriate resources: Identify barriers to discharge with patient and caregiver  3/20/2024 1107 by Kaya Nick RN  Outcome: Progressing  Flowsheets (Taken 3/20/2024 0847)  Discharge to home or other facility with appropriate resources:   Identify barriers to discharge with patient and caregiver   Arrange for needed discharge resources and transportation as appropriate   Identify discharge learning needs (meds, wound care, etc)     Problem: Pain  Goal: Verbalizes/displays adequate comfort level or baseline comfort level  3/20/2024 2253 by Juan Key RN  Outcome: Progressing  Flowsheets (Taken 3/20/2024 2005)  Verbalizes/displays adequate comfort level or baseline comfort level: Assess pain using appropriate pain scale  3/20/2024 1107 by Kaya Nick RN  Outcome: Progressing  Flowsheets (Taken 3/20/2024 0847)  Verbalizes/displays adequate comfort level or baseline comfort level:   Encourage patient to monitor pain and request assistance   Assess pain using appropriate pain scale   Implement non-pharmacological measures as appropriate and evaluate response   Administer analgesics based on type and severity of pain and evaluate response     Problem: Skin/Tissue Integrity  Goal: Absence of new skin breakdown  Description: 1.  Monitor for areas of redness and/or skin breakdown  2.  Assess vascular access sites hourly  3.  Every 4-6 hours minimum:  Change oxygen saturation probe site  4.  Every 4-6 hours:  If on nasal continuous positive airway pressure, respiratory therapy assess nares and determine need for appliance change or resting period.  3/20/2024 2253 by Juan Key RN  Outcome: Progressing  3/20/2024 1107 by Kaya Nick RN  Outcome:

## 2024-03-22 LAB
ANION GAP SERPL CALC-SCNC: 16 MEQ/L (ref 8–16)
BUN SERPL-MCNC: 39 MG/DL (ref 7–22)
CALCIUM SERPL-MCNC: 9.1 MG/DL (ref 8.5–10.5)
CHLORIDE SERPL-SCNC: 98 MEQ/L (ref 98–111)
CO2 SERPL-SCNC: 24 MEQ/L (ref 23–33)
CREAT SERPL-MCNC: 5.8 MG/DL (ref 0.4–1.2)
GFR SERPL CREATININE-BSD FRML MDRD: 7 ML/MIN/1.73M2
GLUCOSE SERPL-MCNC: 81 MG/DL (ref 70–108)
POTASSIUM SERPL-SCNC: 4.2 MEQ/L (ref 3.5–5.2)
SODIUM SERPL-SCNC: 138 MEQ/L (ref 135–145)

## 2024-03-22 PROCEDURE — 36415 COLL VENOUS BLD VENIPUNCTURE: CPT

## 2024-03-22 PROCEDURE — 1180000000 HC REHAB R&B

## 2024-03-22 PROCEDURE — 97116 GAIT TRAINING THERAPY: CPT

## 2024-03-22 PROCEDURE — 6370000000 HC RX 637 (ALT 250 FOR IP): Performed by: PHYSICAL MEDICINE & REHABILITATION

## 2024-03-22 PROCEDURE — 97530 THERAPEUTIC ACTIVITIES: CPT

## 2024-03-22 PROCEDURE — 90935 HEMODIALYSIS ONE EVALUATION: CPT

## 2024-03-22 PROCEDURE — 6360000002 HC RX W HCPCS: Performed by: PHYSICAL MEDICINE & REHABILITATION

## 2024-03-22 PROCEDURE — 97110 THERAPEUTIC EXERCISES: CPT

## 2024-03-22 PROCEDURE — 99232 SBSQ HOSP IP/OBS MODERATE 35: CPT | Performed by: PHYSICAL MEDICINE & REHABILITATION

## 2024-03-22 PROCEDURE — 97130 THER IVNTJ EA ADDL 15 MIN: CPT

## 2024-03-22 PROCEDURE — 80048 BASIC METABOLIC PNL TOTAL CA: CPT

## 2024-03-22 PROCEDURE — 97535 SELF CARE MNGMENT TRAINING: CPT

## 2024-03-22 PROCEDURE — 97129 THER IVNTJ 1ST 15 MIN: CPT

## 2024-03-22 PROCEDURE — 99232 SBSQ HOSP IP/OBS MODERATE 35: CPT | Performed by: INTERNAL MEDICINE

## 2024-03-22 RX ORDER — MEGESTROL ACETATE 40 MG/1
40 TABLET ORAL DAILY
Status: DISCONTINUED | OUTPATIENT
Start: 2024-03-22 | End: 2024-03-30 | Stop reason: HOSPADM

## 2024-03-22 RX ADMIN — AMLODIPINE BESYLATE 5 MG: 5 TABLET ORAL at 10:35

## 2024-03-22 RX ADMIN — DOCUSATE SODIUM 100 MG: 100 CAPSULE, LIQUID FILLED ORAL at 10:35

## 2024-03-22 RX ADMIN — HEPARIN SODIUM 5000 UNITS: 5000 INJECTION INTRAVENOUS; SUBCUTANEOUS at 21:44

## 2024-03-22 RX ADMIN — PANTOPRAZOLE SODIUM 40 MG: 40 TABLET, DELAYED RELEASE ORAL at 06:09

## 2024-03-22 RX ADMIN — HYDROCODONE BITARTRATE AND ACETAMINOPHEN 1 TABLET: 5; 325 TABLET ORAL at 06:34

## 2024-03-22 RX ADMIN — TRAZODONE HYDROCHLORIDE 100 MG: 100 TABLET ORAL at 21:44

## 2024-03-22 RX ADMIN — BUPROPION HYDROCHLORIDE 300 MG: 300 TABLET, FILM COATED, EXTENDED RELEASE ORAL at 10:35

## 2024-03-22 RX ADMIN — MAGNESIUM HYDROXIDE 15 ML: 1200 LIQUID ORAL at 21:43

## 2024-03-22 RX ADMIN — CLOPIDOGREL BISULFATE 75 MG: 75 TABLET ORAL at 10:35

## 2024-03-22 RX ADMIN — MONTELUKAST SODIUM 10 MG: 10 TABLET ORAL at 10:35

## 2024-03-22 RX ADMIN — NALOXEGOL OXALATE 12.5 MG: 12.5 TABLET, FILM COATED ORAL at 06:38

## 2024-03-22 RX ADMIN — FOLIC ACID 500 MCG: 1 TABLET ORAL at 10:35

## 2024-03-22 RX ADMIN — SENNOSIDES 17.2 MG: 8.6 TABLET, FILM COATED ORAL at 21:44

## 2024-03-22 RX ADMIN — HEPARIN SODIUM 5000 UNITS: 5000 INJECTION INTRAVENOUS; SUBCUTANEOUS at 10:36

## 2024-03-22 RX ADMIN — DOCUSATE SODIUM 100 MG: 100 CAPSULE, LIQUID FILLED ORAL at 21:44

## 2024-03-22 RX ADMIN — Medication 6 MG: at 21:43

## 2024-03-22 RX ADMIN — ATORVASTATIN CALCIUM 10 MG: 10 TABLET, FILM COATED ORAL at 10:35

## 2024-03-22 ASSESSMENT — PAIN DESCRIPTION - PAIN TYPE: TYPE: ACUTE PAIN;SURGICAL PAIN

## 2024-03-22 ASSESSMENT — PAIN SCALES - GENERAL
PAINLEVEL_OUTOF10: 7
PAINLEVEL_OUTOF10: 9
PAINLEVEL_OUTOF10: 7

## 2024-03-22 ASSESSMENT — PAIN DESCRIPTION - DESCRIPTORS
DESCRIPTORS: DISCOMFORT
DESCRIPTORS: ACHING

## 2024-03-22 ASSESSMENT — PAIN DESCRIPTION - LOCATION
LOCATION: HIP
LOCATION: HIP

## 2024-03-22 ASSESSMENT — PAIN DESCRIPTION - FREQUENCY: FREQUENCY: INTERMITTENT

## 2024-03-22 ASSESSMENT — PAIN DESCRIPTION - ONSET: ONSET: ON-GOING

## 2024-03-22 ASSESSMENT — PAIN DESCRIPTION - ORIENTATION
ORIENTATION: LEFT
ORIENTATION: LEFT

## 2024-03-22 NOTE — PROGRESS NOTES
Independent  Transfer Assistance: Independent    Active : No  Patient's  Info: Pt can drive, but  does all the driving     Additional Comments: Patient reports she walks with a rolling walker PTA. Patient was on IPR in Dec 2023 for 2 weeks after falling and breaking L2 vertebra.  very supportive and able to assist at discharge.  takes patient to dialysis M-W-F.    SUBJECTIVE: In bed upon arrival eating breakfast, agreeable to OT session    PAIN: 7/10: L hip, ice applied    Vitals: Vitals not assessed per clinical judgement, see nursing flowsheet    COGNITION: Slow Processing, Decreased Recall, Decreased Insight, Impaired Memory, and Decreased Problem Solving  Able to recall hip precautions while reading the board in her room  ADL:   Feeding: with set-up.  A for opening container, poor hand strength  Grooming: with set-up.  Brushed hair sitting in bedside chair  Footwear Management: Moderate Assistance.  Used reacher and sock aid, difficulty problem solving equipment .    IADL:   Not Tested    BED MOBILITY:  Supine to Sit: Minimal Assistance HOB elevated, used bedrail and increased time    TRANSFERS:  Sit to Stand:  Contact Guard Assistance. EOB  Stand to Sit: Contact Guard Assistance. Bedside chair    FUNCTIONAL MOBILITY:  Assistive Device: Rolling Walker  Assist Level:  Contact Guard Assistance.   Distance: EOB to bedside chair       ADDITIONAL ACTIVITIES:  Patient completed BUE strengthening exercises with skilled education on HEP: completed x10 reps x1 set with a yellow band in all joints and all planes in order to improve UE strength and activity tolerance required for BADL routine and toilet / shower transfers. Patient tolerated well, requiring minimal rest breaks. Patient also required minimal cues for technique.            Modified [place] Scale:  Not Applicable    ASSESSMENT:     Activity Tolerance:  Patient tolerance of  treatment: Good treatment tolerance      Discharge

## 2024-03-22 NOTE — PROGRESS NOTES
Spoke briefly with patient, who reports she is feeling somewhat stronger. Still very poor appetite.  reports \"she just eats a bite or two.\" We discussed that and patient indicates she is going to try to increase her intake.     I will f/u with patient after discharge.

## 2024-03-22 NOTE — FLOWSHEET NOTE
3 hour TX completed. Removed 1 L of fluid. pt tolerated fluid removal well. Bilateral cath ports flushed with normal saline, clamped, and secured with tegos. CVC drsg clean, dry, and intact. Report called to primary RN. TX record printed for scanning into EMR.    03/22/24 1155 03/22/24 4124   Vital Signs   BP (!) 160/73 124/75   Temp 97.3 °F (36.3 °C) 97.9 °F (36.6 °C)   Pulse 79 92   Respirations 18 18   Weight - Scale 43.3 kg (95 lb 7.4 oz) 42.3 kg (93 lb 4.1 oz)   Weight Method Bed scale Bed scale   Percent Weight Change -5.46 -2.31   Post-Hemodialysis Assessment   Post-Treatment Procedures  --  Blood returned;Catheter Capped, clamped with Saline x2 ports   Machine Disinfection Process  --  Acid/Vinegar Clean;Heat Disinfect;Exterior Machine Disinfection   Blood Volume Processed (Liters)  --  69.7 L   Dialyzer Clearance  --  Lightly streaked   Duration of Treatment (minutes)  --  180 minutes   Heparin Amount Administered During Treatment (mL)  --  0 mL   Hemodialysis Intake (ml)  --  400 ml   Hemodialysis Output (ml)  --  1400 ml   NET Removed (ml)  --  1000   Tolerated Treatment  --  Good        Statement Selected

## 2024-03-22 NOTE — PLAN OF CARE
Problem: Discharge Planning  Goal: Discharge to home or other facility with appropriate resources  3/22/2024 1127 by Vanessa Montano RN  Outcome: Progressing  Flowsheets (Taken 3/22/2024 1127)  Discharge to home or other facility with appropriate resources:   Identify barriers to discharge with patient and caregiver   Identify discharge learning needs (meds, wound care, etc)     Problem: Pain  Goal: Verbalizes/displays adequate comfort level or baseline comfort level  3/22/2024 1127 by Vanessa Montano RN  Outcome: Progressing  Flowsheets (Taken 3/22/2024 1127)  Verbalizes/displays adequate comfort level or baseline comfort level:   Assess pain using appropriate pain scale   Implement non-pharmacological measures as appropriate and evaluate response     Problem: Safety - Adult  Goal: Free from fall injury  3/22/2024 1127 by Vanessa Montano RN  Outcome: Progressing  Flowsheets (Taken 3/22/2024 1127)  Free From Fall Injury: Instruct family/caregiver on patient safety

## 2024-03-22 NOTE — PROGRESS NOTES
[facility]  INPATIENT SPEECH THERAPY  [facility]  DAILY NOTE    TIME   SLP Individual Minutes  Time In: 0800  Time Out: 0830  Minutes: 30  Timed Code Treatment Minutes: 30 Minutes     Date: 3/22/2024  Patient Name: Deloris Watts      CSN: [id number]   : 1955  (68 y.o.)  Gender: female   Referring Physician:  Dr. Jax Whatley  Diagnosis: Closed Fracture of Neck of Left Femur   Precautions: fall risk  Current Diet: Regular solids. Thin liquids.  Respiratory Status: Room Air  Swallowing Strategies: Standard Universal Swallow Precautions  Date of Last MBS/FEES: Not Applicable    Pain:  No pain reported.    Subjective: Patient sitting in recliner upon ST arrival. Patient agreeable to interventions and to complete session outside of the room; participatory and pleasant. No family present.    Short-Term Goals:  SHORT TERM GOAL #1:  Goal 1: Patient will complete delayed recall tasks of 3+ units of information (including Cog-Log) with 75% accuracy, with or without use of compensatory strategies, given mod cues, to improve retention of novel information.  INTERVENTIONS:   Recall of Grocery List (milk, bananas, corn, pizza, shampoo, conditioner):  ~1 Day Delayed Recall: 2/6 independent, 3/6 min cues, 1/6 mod cues    SHORT TERM GOAL #2:  Goal 2: Patient will complete functional executive functioning (basic finances, medications), problem solving (time, money) and verbal/visual reasoning tasks with 80% accuracy at given mod cues for potential return to PLOF.   INTERVENTIONS:   Math Calculations:   Grocery Total: mod cues   20% Off: min cues   Change from Totals: 1/2 independent, 1/2 max cue  *Needed orientated for the calculator; improved use as task progressed.    SHORT TERM GOAL #3:  Goal 3: Patient will complete thought organization tasks with 70% accuracy given mod cues to improve processing speed and organization during ADL tasks.   INTERVENTIONS:   Navigation To and  participatory.  Assessment/Plan: Patient progressing toward established goals.  Continues to require skilled care of licensed speech pathologist to progress toward achievement of established goals and plan of care.  Plan for Next Session: recall, evaluating a pillbox for errors, problem solving (money), strategy training/carryover, time management  Discharge Recommendations: Home with Home Exercise Program, Outpatient Speech Therapy, and Continue to Assess Pending Progress       MARICRUZ Paulson. Speech Intern

## 2024-03-22 NOTE — PROGRESS NOTES
Rolling Walker with Stand By Assistance. Activity completed to improve balance, enhance functional mobility, and reduce risk of falls. Completed 2 trials of task with seated rest break in between.     Neuromuscular Re-education  Pt. Completed Stepping activities using Bilateral UE support on Rolling Walker to improve postural awareness, gait mechanics, hip/quad stability, and lateral weight shifting for improved functional mobility.   -Completed step taps onto pods with BLE's in alternating fashion to work on lateral weight shifting onto left side and also to improve foot clearance knee flexion on LLE during swing phase.     Exercise:  Patient was guided in 1 set(s) 10 reps of exercises: Glut sets, Seated hamstring curls, Seated heel/toe raises, Long arc quads, and Seated isometric hip adduction.  Exercises were completed for increased independence with functional mobility.    Functional Outcome Measures:   Not completed  Modified Grimes Scale:  Not Applicable     ASSESSMENT:  Assessment: Patient progressing toward established goals.  Activity Tolerance:  Patient tolerance of  treatment: good.     Equipment Recommendations:Equipment Needed: No  Discharge Recommendations: Continue to assess pending progress, Patient would benefit from continued therapy after discharge     PLAN: Current Treatment Recommendations: Strengthening, Balance training, Functional mobility training, Transfer training, Endurance training, Neuromuscular re-education, Gait training, Stair training, Home exercise program, Patient/Caregiver education & training, Safety education & training, Therapeutic activities, Equipment evaluation, education, & procurement, Wheelchair mobility training  General Plan:  (5x/wk 90min)    Patient Education  Patient Education: Plan of Care, Functional Mobility, Reviewed Prior Education, Health Promotion and Wellness Education, Safety, Verbal Exercise Instruction, Precautions/Restrictions, Transfers, Gait, Stairs,

## 2024-03-22 NOTE — PLAN OF CARE
(Taken 3/22/2024 2256)  Care Plan - Patient's Chronic Conditions and Co-Morbidity Symptoms are Monitored and Maintained or Improved: Monitor and assess patient's chronic conditions and comorbid symptoms for stability, deterioration, or improvement

## 2024-03-22 NOTE — PROGRESS NOTES
[facility]   Nephrology progress note  3/22/2024, 10:30 AM      Pt Name:    Deloris Watts  MRN:     [id number]     YOB: 1955  Admit Date:    3/14/2024  5:39 PM    Chief Complaint: Nephrology following for ESRD on hemodialysis.    Subjective:  Patient seen and examined  No chest pain or shortness of breath  Feels okay.  No other complaints  Undergoing rehab well    Objective:  24HR INTAKE/OUTPUT:  No intake or output data in the 24 hours ending 03/22/24 1031     Admission weight: 46.7 kg (103 lb)  Wt Readings from Last 3 Encounters:   03/22/24 45.8 kg (100 lb 15.5 oz)   03/13/24 42.7 kg (94 lb 2.2 oz)   02/22/24 47 kg (103 lb 9.6 oz)        Vitals :   Vitals:    03/21/24 1400 03/21/24 2037 03/22/24 0634 03/22/24 0844   BP:  125/63  111/86   Pulse:  86  78   Resp: 17 14 16 17   Temp:  97.9 °F (36.6 °C)  97.5 °F (36.4 °C)   TempSrc:  Oral  Oral   SpO2:  100%     Weight:   45.8 kg (100 lb 15.5 oz)    Height:   1.524 m (5')        Physical examination  General Appearance:  Well developed. No distress  Mouth/Throat:  Oral mucosa moist  Neck:  Supple, no JVD  Lungs:  Breath sounds: clear  Heart::  S1,S2 heard  Abdomen:  Soft, non - tender  Musculoskeletal:  Edema - none    Medications:  Infusion:    sodium chloride      dextrose       Meds:    megestrol  40 mg Oral Daily    pantoprazole  40 mg Oral QAM AC    diclofenac sodium  2 g Topical BID    traZODone  100 mg Oral Nightly    amLODIPine  5 mg Oral Daily    atorvastatin  10 mg Oral Daily    buPROPion  300 mg Oral Daily    clopidogrel  75 mg Oral Daily    docusate sodium  1 enema Rectal Daily    folic acid  500 mcg Oral Daily    heparin (porcine)  5,000 Units SubCUTAneous BID    montelukast  10 mg Oral Daily    naloxegol  12.5 mg Oral QAM AC    senna  2 tablet Oral Nightly    sevelamer  800 mg Oral Lunch    docusate sodium  100 mg Oral BID    melatonin  6 mg Oral Nightly       Lab Data :  CBC:   No results for input(s): \"WBC\", \"HGB\",

## 2024-03-23 LAB
ANION GAP SERPL CALC-SCNC: 14 MEQ/L (ref 8–16)
BUN SERPL-MCNC: 22 MG/DL (ref 7–22)
CALCIUM SERPL-MCNC: 8.8 MG/DL (ref 8.5–10.5)
CHLORIDE SERPL-SCNC: 95 MEQ/L (ref 98–111)
CO2 SERPL-SCNC: 26 MEQ/L (ref 23–33)
CREAT SERPL-MCNC: 3.7 MG/DL (ref 0.4–1.2)
GFR SERPL CREATININE-BSD FRML MDRD: 13 ML/MIN/1.73M2
GLUCOSE SERPL-MCNC: 87 MG/DL (ref 70–108)
POTASSIUM SERPL-SCNC: 3.6 MEQ/L (ref 3.5–5.2)
SODIUM SERPL-SCNC: 135 MEQ/L (ref 135–145)

## 2024-03-23 PROCEDURE — 99231 SBSQ HOSP IP/OBS SF/LOW 25: CPT | Performed by: INTERNAL MEDICINE

## 2024-03-23 PROCEDURE — 80048 BASIC METABOLIC PNL TOTAL CA: CPT

## 2024-03-23 PROCEDURE — 36415 COLL VENOUS BLD VENIPUNCTURE: CPT

## 2024-03-23 PROCEDURE — 1180000000 HC REHAB R&B

## 2024-03-23 PROCEDURE — 6370000000 HC RX 637 (ALT 250 FOR IP): Performed by: PHYSICAL MEDICINE & REHABILITATION

## 2024-03-23 PROCEDURE — 6360000002 HC RX W HCPCS: Performed by: PHYSICAL MEDICINE & REHABILITATION

## 2024-03-23 RX ADMIN — FOLIC ACID 500 MCG: 1 TABLET ORAL at 10:13

## 2024-03-23 RX ADMIN — BUPROPION HYDROCHLORIDE 300 MG: 300 TABLET, FILM COATED, EXTENDED RELEASE ORAL at 10:13

## 2024-03-23 RX ADMIN — CLOPIDOGREL BISULFATE 75 MG: 75 TABLET ORAL at 10:17

## 2024-03-23 RX ADMIN — Medication 6 MG: at 20:59

## 2024-03-23 RX ADMIN — AMLODIPINE BESYLATE 5 MG: 5 TABLET ORAL at 10:13

## 2024-03-23 RX ADMIN — TRAZODONE HYDROCHLORIDE 100 MG: 100 TABLET ORAL at 20:59

## 2024-03-23 RX ADMIN — HYDROCODONE BITARTRATE AND ACETAMINOPHEN 1 TABLET: 5; 325 TABLET ORAL at 20:58

## 2024-03-23 RX ADMIN — NALOXEGOL OXALATE 12.5 MG: 12.5 TABLET, FILM COATED ORAL at 07:00

## 2024-03-23 RX ADMIN — PANTOPRAZOLE SODIUM 40 MG: 40 TABLET, DELAYED RELEASE ORAL at 06:54

## 2024-03-23 RX ADMIN — SENNOSIDES 17.2 MG: 8.6 TABLET, FILM COATED ORAL at 20:59

## 2024-03-23 RX ADMIN — DOCUSATE SODIUM 283 MG: 283 LIQUID RECTAL at 17:00

## 2024-03-23 RX ADMIN — MEGESTROL ACETATE 40 MG: 40 TABLET ORAL at 10:13

## 2024-03-23 RX ADMIN — HEPARIN SODIUM 5000 UNITS: 5000 INJECTION INTRAVENOUS; SUBCUTANEOUS at 21:04

## 2024-03-23 RX ADMIN — SEVELAMER CARBONATE 800 MG: 800 TABLET, FILM COATED ORAL at 12:29

## 2024-03-23 RX ADMIN — DOCUSATE SODIUM 100 MG: 100 CAPSULE, LIQUID FILLED ORAL at 10:17

## 2024-03-23 RX ADMIN — MONTELUKAST SODIUM 10 MG: 10 TABLET ORAL at 10:13

## 2024-03-23 RX ADMIN — HEPARIN SODIUM 5000 UNITS: 5000 INJECTION INTRAVENOUS; SUBCUTANEOUS at 10:13

## 2024-03-23 RX ADMIN — DOCUSATE SODIUM 100 MG: 100 CAPSULE, LIQUID FILLED ORAL at 21:00

## 2024-03-23 RX ADMIN — ATORVASTATIN CALCIUM 10 MG: 10 TABLET, FILM COATED ORAL at 10:13

## 2024-03-23 RX ADMIN — ACETAMINOPHEN 325 MG: 325 TABLET ORAL at 06:53

## 2024-03-23 ASSESSMENT — PAIN DESCRIPTION - DESCRIPTORS
DESCRIPTORS: ACHING
DESCRIPTORS: ACHING
DESCRIPTORS: ACHING;DISCOMFORT

## 2024-03-23 ASSESSMENT — PAIN SCALES - GENERAL
PAINLEVEL_OUTOF10: 5
PAINLEVEL_OUTOF10: 0
PAINLEVEL_OUTOF10: 5
PAINLEVEL_OUTOF10: 0

## 2024-03-23 ASSESSMENT — PAIN DESCRIPTION - LOCATION
LOCATION: HIP

## 2024-03-23 ASSESSMENT — PAIN DESCRIPTION - ORIENTATION
ORIENTATION: LEFT

## 2024-03-23 ASSESSMENT — PAIN DESCRIPTION - FREQUENCY: FREQUENCY: INTERMITTENT

## 2024-03-23 ASSESSMENT — PAIN DESCRIPTION - PAIN TYPE: TYPE: ACUTE PAIN

## 2024-03-23 ASSESSMENT — PAIN DESCRIPTION - ONSET: ONSET: ON-GOING

## 2024-03-23 ASSESSMENT — PAIN - FUNCTIONAL ASSESSMENT: PAIN_FUNCTIONAL_ASSESSMENT: PREVENTS OR INTERFERES SOME ACTIVE ACTIVITIES AND ADLS

## 2024-03-23 NOTE — PROGRESS NOTES
[facility]   Nephrology progress note  3/23/2024, 12:10 PM      Pt Name:    Deloris Watts  MRN:     [id number]     YOB: 1955  Admit Date:    3/14/2024  5:39 PM    Chief Complaint: Nephrology following for ESRD on hemodialysis.    Subjective:  Patient seen and examined  Feels ok, no complaints.    Objective:  24HR INTAKE/OUTPUT:    Intake/Output Summary (Last 24 hours) at 3/23/2024 1210  Last data filed at 3/22/2024 1517  Gross per 24 hour   Intake 400 ml   Output 1400 ml   Net -1000 ml      Admission weight: 46.7 kg (103 lb)  Wt Readings from Last 3 Encounters:   03/23/24 46.5 kg (102 lb 8.2 oz)   03/13/24 42.7 kg (94 lb 2.2 oz)   02/22/24 47 kg (103 lb 9.6 oz)        Vitals :   Vitals:    03/22/24 1517 03/22/24 2100 03/23/24 0653 03/23/24 1010   BP: 124/75 (!) 147/68  117/63   Pulse: 92 94  87   Resp: 18 16  18   Temp: 97.9 °F (36.6 °C) 99.1 °F (37.3 °C)  97.9 °F (36.6 °C)   TempSrc:  Oral  Oral   SpO2:  95%  95%   Weight: 42.3 kg (93 lb 4.1 oz)  46.5 kg (102 lb 8.2 oz)    Height:   1.524 m (5')        Physical examination  General Appearance:  Well developed. No distress  Mouth/Throat:  Oral mucosa moist  Neck:  Supple, no JVD  Lungs:  Breath sounds: clear  Heart::  S1,S2 heard  Abdomen:  Soft, non - tender  Musculoskeletal:  Edema - none    Medications:  Infusion:    sodium chloride      dextrose       Meds:    megestrol  40 mg Oral Daily    pantoprazole  40 mg Oral QAM AC    diclofenac sodium  2 g Topical BID    traZODone  100 mg Oral Nightly    amLODIPine  5 mg Oral Daily    atorvastatin  10 mg Oral Daily    buPROPion  300 mg Oral Daily    clopidogrel  75 mg Oral Daily    docusate sodium  1 enema Rectal Daily    folic acid  500 mcg Oral Daily    heparin (porcine)  5,000 Units SubCUTAneous BID    montelukast  10 mg Oral Daily    naloxegol  12.5 mg Oral QAM AC    senna  2 tablet Oral Nightly    sevelamer  800 mg Oral Lunch    docusate sodium  100 mg Oral BID    melatonin  6 mg

## 2024-03-23 NOTE — PROGRESS NOTES
Patient: Deloris Watts  Unit/Bed: 8K-17/017-A  YOB: 1955  MRN: [id number] Acct: [id number]   Admitting Diagnosis: Closed fracture of neck of left femur, initial encounter ([facility]) [S72.002A]  Admit Date:  3/14/2024  Hospital Day: 8    Assessment:     Principal Problem:    Closed fracture of neck of left femur, initial encounter ([facility])  Active Problems:    Depression    Anxiety disorder    History of CVA (cerebrovascular accident)    Hyperlipidemia    Allergic rhinitis    Essential hypertension    Generalized anxiety disorder    ESRD on hemodialysis ([facility])    Chronic constipation  Resolved Problems:    * No resolved hospital problems. *      Plan:     Continue to follow        Subjective:     Patient has no complaint of CP or SOB..   Medication side effects: none    Scheduled Meds:   megestrol  40 mg Oral Daily    pantoprazole  40 mg Oral QAM AC    diclofenac sodium  2 g Topical BID    traZODone  100 mg Oral Nightly    amLODIPine  5 mg Oral Daily    atorvastatin  10 mg Oral Daily    buPROPion  300 mg Oral Daily    clopidogrel  75 mg Oral Daily    docusate sodium  1 enema Rectal Daily    folic acid  500 mcg Oral Daily    heparin (porcine)  5,000 Units SubCUTAneous BID    montelukast  10 mg Oral Daily    naloxegol  12.5 mg Oral QAM AC    senna  2 tablet Oral Nightly    sevelamer  800 mg Oral Lunch    docusate sodium  100 mg Oral BID    melatonin  6 mg Oral Nightly     Continuous Infusions:   sodium chloride      dextrose       PRN Meds:bisacodyl, sodium chloride, dextrose, dextrose bolus **OR** dextrose bolus, docusate sodium, glucagon (rDNA), glucose, HYDROcodone 5 mg - acetaminophen **OR** HYDROcodone 5 mg - acetaminophen, linaclotide, magnesium hydroxide, ondansetron, polyethylene glycol, sodium chloride flush, acetaminophen, diclofenac sodium    Review of Systems  Pertinent items are noted in HPI.    Objective:     Patient Vitals for the past 8 hrs:   BP Temp Temp src Pulse Resp SpO2 Weight   03/22/24

## 2024-03-23 NOTE — PLAN OF CARE
Problem: Discharge Planning  Goal: Discharge to home or other facility with appropriate resources  Outcome: Progressing  Flowsheets (Taken 3/23/2024 0313)  Discharge to home or other facility with appropriate resources:   Identify barriers to discharge with patient and caregiver   Identify discharge learning needs (meds, wound care, etc)     Problem: Pain  Goal: Verbalizes/displays adequate comfort level or baseline comfort level  Outcome: Progressing  Flowsheets (Taken 3/23/2024 0313)  Verbalizes/displays adequate comfort level or baseline comfort level:   Encourage patient to monitor pain and request assistance   Assess pain using appropriate pain scale   Administer analgesics based on type and severity of pain and evaluate response   Implement non-pharmacological measures as appropriate and evaluate response     Problem: ABCDS Injury Assessment  Goal: Absence of physical injury  Outcome: Progressing  Flowsheets (Taken 3/23/2024 0313)  Absence of Physical Injury: Implement safety measures based on patient assessment     Problem: Safety - Adult  Goal: Free from fall injury  Outcome: Progressing  Flowsheets (Taken 3/23/2024 0313)  Free From Fall Injury: Instruct family/caregiver on patient safety     Problem: Chronic Conditions and Co-morbidities  Goal: Patient's chronic conditions and co-morbidity symptoms are monitored and maintained or improved  Outcome: Progressing  Flowsheets (Taken 3/23/2024 0313)  Care Plan - Patient's Chronic Conditions and Co-Morbidity Symptoms are Monitored and Maintained or Improved:   Monitor and assess patient's chronic conditions and comorbid symptoms for stability, deterioration, or improvement   Update acute care plan with appropriate goals if chronic or comorbid symptoms are exacerbated and prevent overall improvement and discharge

## 2024-03-24 ENCOUNTER — APPOINTMENT (OUTPATIENT)
Dept: GENERAL RADIOLOGY | Age: 69
End: 2024-03-24
Attending: PHYSICAL MEDICINE & REHABILITATION
Payer: MEDICARE

## 2024-03-24 LAB
ANION GAP SERPL CALC-SCNC: 18 MEQ/L (ref 8–16)
BUN SERPL-MCNC: 42 MG/DL (ref 7–22)
CALCIUM SERPL-MCNC: 9 MG/DL (ref 8.5–10.5)
CHLORIDE SERPL-SCNC: 97 MEQ/L (ref 98–111)
CO2 SERPL-SCNC: 25 MEQ/L (ref 23–33)
CREAT SERPL-MCNC: 5.5 MG/DL (ref 0.4–1.2)
GFR SERPL CREATININE-BSD FRML MDRD: 8 ML/MIN/1.73M2
GLUCOSE SERPL-MCNC: 136 MG/DL (ref 70–108)
POTASSIUM SERPL-SCNC: 4.2 MEQ/L (ref 3.5–5.2)
SODIUM SERPL-SCNC: 140 MEQ/L (ref 135–145)

## 2024-03-24 PROCEDURE — 36415 COLL VENOUS BLD VENIPUNCTURE: CPT

## 2024-03-24 PROCEDURE — 97535 SELF CARE MNGMENT TRAINING: CPT

## 2024-03-24 PROCEDURE — 97110 THERAPEUTIC EXERCISES: CPT

## 2024-03-24 PROCEDURE — 6370000000 HC RX 637 (ALT 250 FOR IP): Performed by: PHYSICAL MEDICINE & REHABILITATION

## 2024-03-24 PROCEDURE — 80048 BASIC METABOLIC PNL TOTAL CA: CPT

## 2024-03-24 PROCEDURE — 6360000002 HC RX W HCPCS: Performed by: PHYSICAL MEDICINE & REHABILITATION

## 2024-03-24 PROCEDURE — 74018 RADEX ABDOMEN 1 VIEW: CPT

## 2024-03-24 PROCEDURE — 1180000000 HC REHAB R&B

## 2024-03-24 PROCEDURE — 99231 SBSQ HOSP IP/OBS SF/LOW 25: CPT | Performed by: INTERNAL MEDICINE

## 2024-03-24 RX ORDER — DOCUSATE SODIUM 100 MG/1
100 CAPSULE, LIQUID FILLED ORAL 3 TIMES DAILY
Status: DISCONTINUED | OUTPATIENT
Start: 2024-03-24 | End: 2024-03-30 | Stop reason: HOSPADM

## 2024-03-24 RX ORDER — BISACODYL 10 MG
10 SUPPOSITORY, RECTAL RECTAL DAILY
Status: DISCONTINUED | OUTPATIENT
Start: 2024-03-24 | End: 2024-03-30 | Stop reason: HOSPADM

## 2024-03-24 RX ADMIN — ACETAMINOPHEN 650 MG: 325 TABLET ORAL at 12:14

## 2024-03-24 RX ADMIN — HYDROCODONE BITARTRATE AND ACETAMINOPHEN 2 TABLET: 5; 325 TABLET ORAL at 05:51

## 2024-03-24 RX ADMIN — MONTELUKAST SODIUM 10 MG: 10 TABLET ORAL at 09:07

## 2024-03-24 RX ADMIN — SEVELAMER CARBONATE 800 MG: 800 TABLET, FILM COATED ORAL at 12:14

## 2024-03-24 RX ADMIN — BUPROPION HYDROCHLORIDE 300 MG: 300 TABLET, FILM COATED, EXTENDED RELEASE ORAL at 09:07

## 2024-03-24 RX ADMIN — TRAZODONE HYDROCHLORIDE 100 MG: 100 TABLET ORAL at 22:04

## 2024-03-24 RX ADMIN — SENNOSIDES 17.2 MG: 8.6 TABLET, FILM COATED ORAL at 22:04

## 2024-03-24 RX ADMIN — NALOXEGOL OXALATE 25 MG: 25 TABLET, FILM COATED ORAL at 05:52

## 2024-03-24 RX ADMIN — Medication 6 MG: at 22:04

## 2024-03-24 RX ADMIN — DOCUSATE SODIUM 100 MG: 100 CAPSULE, LIQUID FILLED ORAL at 22:04

## 2024-03-24 RX ADMIN — AMLODIPINE BESYLATE 5 MG: 5 TABLET ORAL at 09:07

## 2024-03-24 RX ADMIN — DOCUSATE SODIUM 283 MG: 283 LIQUID RECTAL at 14:31

## 2024-03-24 RX ADMIN — ACETAMINOPHEN 650 MG: 325 TABLET ORAL at 22:04

## 2024-03-24 RX ADMIN — HEPARIN SODIUM 5000 UNITS: 5000 INJECTION INTRAVENOUS; SUBCUTANEOUS at 09:06

## 2024-03-24 RX ADMIN — BISACODYL 10 MG: 10 SUPPOSITORY RECTAL at 19:19

## 2024-03-24 RX ADMIN — CLOPIDOGREL BISULFATE 75 MG: 75 TABLET ORAL at 09:07

## 2024-03-24 RX ADMIN — DOCUSATE SODIUM 100 MG: 100 CAPSULE, LIQUID FILLED ORAL at 09:07

## 2024-03-24 RX ADMIN — ATORVASTATIN CALCIUM 10 MG: 10 TABLET, FILM COATED ORAL at 09:07

## 2024-03-24 RX ADMIN — PANTOPRAZOLE SODIUM 40 MG: 40 TABLET, DELAYED RELEASE ORAL at 05:52

## 2024-03-24 RX ADMIN — HEPARIN SODIUM 5000 UNITS: 5000 INJECTION INTRAVENOUS; SUBCUTANEOUS at 22:04

## 2024-03-24 RX ADMIN — FOLIC ACID 500 MCG: 1 TABLET ORAL at 09:07

## 2024-03-24 RX ADMIN — MEGESTROL ACETATE 40 MG: 40 TABLET ORAL at 09:07

## 2024-03-24 ASSESSMENT — PAIN DESCRIPTION - LOCATION
LOCATION: HIP

## 2024-03-24 ASSESSMENT — PAIN DESCRIPTION - PAIN TYPE: TYPE: ACUTE PAIN

## 2024-03-24 ASSESSMENT — PAIN DESCRIPTION - ORIENTATION
ORIENTATION: LEFT

## 2024-03-24 ASSESSMENT — PAIN SCALES - GENERAL
PAINLEVEL_OUTOF10: 2
PAINLEVEL_OUTOF10: 2
PAINLEVEL_OUTOF10: 5
PAINLEVEL_OUTOF10: 8
PAINLEVEL_OUTOF10: 2

## 2024-03-24 ASSESSMENT — PAIN DESCRIPTION - FREQUENCY: FREQUENCY: INTERMITTENT

## 2024-03-24 ASSESSMENT — PAIN DESCRIPTION - DESCRIPTORS
DESCRIPTORS: ACHING;DISCOMFORT
DESCRIPTORS: ACHING
DESCRIPTORS: ACHING

## 2024-03-24 ASSESSMENT — PAIN DESCRIPTION - ONSET: ONSET: ON-GOING

## 2024-03-24 NOTE — CARE COORDINATION
Patient has not had a BM for 6 days. This nurse gave oral medications and enemeez without results.  was made aware of no BM and orders received for KUB and daily suppository.

## 2024-03-24 NOTE — PROGRESS NOTES
[facility]   Nephrology progress note  3/24/2024, 11:10 AM      Pt Name:    Deloris Watts  MRN:     [id number]     YOB: 1955  Admit Date:    3/14/2024  5:39 PM    Chief Complaint: Nephrology following for ESRD on hemodialysis.    Subjective:  Patient seen and examined.   Family member at bedside.   Pt feels ok no complaints.    Objective:  24HR INTAKE/OUTPUT:    Intake/Output Summary (Last 24 hours) at 3/24/2024 1110  Last data filed at 3/24/2024 0943  Gross per 24 hour   Intake 420 ml   Output --   Net 420 ml      Admission weight: 46.7 kg (103 lb)  Wt Readings from Last 3 Encounters:   03/23/24 46.5 kg (102 lb 8.2 oz)   03/13/24 42.7 kg (94 lb 2.2 oz)   02/22/24 47 kg (103 lb 9.6 oz)        Vitals :   Vitals:    03/23/24 2045 03/23/24 2128 03/24/24 0621 03/24/24 0800   BP: (!) 122/55   132/70   Pulse: 99   78   Resp: 18 18 18 17   Temp: 98.1 °F (36.7 °C)   97.5 °F (36.4 °C)   TempSrc: Oral   Oral   SpO2: 98%   99%   Weight:       Height:           Physical examination  General Appearance:  Well developed. No distress  Mouth/Throat:  Oral mucosa moist  Neck:  Supple, no JVD  Lungs:  Breath sounds: clear  Heart::  S1,S2 heard  Abdomen:  Soft, non - tender  Musculoskeletal:  Edema - none    Medications:  Infusion:    sodium chloride      dextrose       Meds:    naloxegol  25 mg Oral QAM AC    linaclotide  290 mcg Oral QAM AC    megestrol  40 mg Oral Daily    pantoprazole  40 mg Oral QAM AC    diclofenac sodium  2 g Topical BID    traZODone  100 mg Oral Nightly    amLODIPine  5 mg Oral Daily    atorvastatin  10 mg Oral Daily    buPROPion  300 mg Oral Daily    clopidogrel  75 mg Oral Daily    docusate sodium  1 enema Rectal Daily    folic acid  500 mcg Oral Daily    heparin (porcine)  5,000 Units SubCUTAneous BID    montelukast  10 mg Oral Daily    senna  2 tablet Oral Nightly    sevelamer  800 mg Oral Lunch    docusate sodium  100 mg Oral BID    melatonin  6 mg Oral Nightly        Lab Data :  CBC:   No results for input(s): \"WBC\", \"HGB\", \"HCT\", \"PLT\" in the last 72 hours.    CMP:  Recent Labs     03/22/24  0613 03/23/24  0620 03/24/24  0644    135 140   K 4.2 3.6 4.2   CL 98 95* 97*   CO2 24 26 25   BUN 39* 22 42*   CREATININE 5.8* 3.7* 5.5*   GLUCOSE 81 87 136*   CALCIUM 9.1 8.8 9.0     Hepatic: No results for input(s): \"LABALBU\", \"AST\", \"ALT\", \"ALB\", \"BILITOT\", \"ALKPHOS\" in the last 72 hours.    Assessment and Plan:  Renal -ESRD on hemodialysis Monday Wednesday Friday  HD tomorrow  Electrolytes -stable    Essential hypertension: controlled  Fracture femur status post surgery 3/11/2024 undergoing rehab  Meds reviewed and discussed with patient    Charlotte Ballard,   [facility]    This report has been created using voice recognition software. It may contain minor errors which are inherent in voice recognition technology

## 2024-03-24 NOTE — PROGRESS NOTES
[facility]  INPATIENT PHYSICAL THERAPY  [facility] - 8K-17/017-A  Daily Note    Time In: 1135  Time Out: 1205  Timed Code Treatment Minutes: 30 Minutes  Minutes: 30          Date: 3/24/2024  Patient Name: Deloris Watts,  Gender:  female        MRN: [id number]  : 1955  (68 y.o.)  Referral Date : 24  Referring Practitioner: Jax Whatley MD  Diagnosis: Closed fracture of neck of left femur, initial encounter  Additional Pertinent Hx: Per H&P:Deloris Watts  is a 68 y.o. right-handed  female with history of hypertension, hyperlipidemia, hydronephrosis, fibromyalgia, irritable bowel syndrome, osteoporosis, end-stage renal disease on hemodialysis (MWF) since 2023, stroke with left side weakness in , anemia, depression, anxiety, right wrist and right ankle fracture treated conservatively, right proximal humeral fracture due to fall requiring ORIF, status post right carpal tunnel release surgery, status post cervical spine surgery, status post hysterectomy and bilateral oophorectomy, hemorrhoidectomy, sinus surgery, tubal ligation, L2 compression fracture requiring kyphoplasty, LASEK surgery, is admitted to the inpatient rehabilitation unit on 3/14/2024 for intensive inpatient rehabilitation treatment impairment and disability secondary to displaced left femur neck fracture as result of fall on 3/10/2024 requiring left hip hemiarthroplasty surgery.The patient says while she was pulling up her pant after using toilet in her home's bathroom on 3/10/2024, she suddenly lost balance and fell to the ground hitting her left hip.  She thinks she also hit her head to something at the time.  She did not loss consciousness.  She immediately feels severe pain at her left hip and was unable to get up even with her 's assistance.  Therefore 911 was called and the patient was sent to [facility] ER by EMS.  Multiple x-ray and CAT  Tolerance:  Patient tolerance of  treatment: fair.     Equipment Recommendations:Equipment Needed: No  Discharge Recommendations:  Continue to assess pending progress and Patient would benefit from continued PT at discharge    PLAN: Current Treatment Recommendations: Strengthening, Balance training, Functional mobility training, Transfer training, Endurance training, Neuromuscular re-education, Gait training, Stair training, Home exercise program, Patient/Caregiver education & training, Safety education & training, Therapeutic activities, Equipment evaluation, education, & procurement, Wheelchair mobility training  General Plan:  (5x/wk 90min)    Patient Education  Patient Education: Plan of Care, Functional Mobility, Reviewed Prior Education, Health Promotion and Wellness Education, Safety, Verbal Exercise Instruction, Transfers, Gait    Goals:  Patient Goals : go home and decrease pain  Short Term Goals  Time Frame for Short Term Goals: 1 week  Short Term Goal 1: Patient will complete supine < > sit with head of bed elevated ~10 degrees, no rail and min assist to transfer in and out of bed with decreased difficulty.  Short Term Goal 2: Patient will complete sit  <> stand with CGA to stand to ambulate with decrease difficulty.  Short Term Goal 3: Patient will ambulate 20' with a RW and CGA to progress towards navigating home safely.  Short Term Goal 4: Patient will ascend/descend 2\" step in parallel bars with min assist to progress towards stair entry into home.  Short Term Goal 5: Patient will propel wheelchair 50' with SBA to increase mobility and independence on inpatient rehab unit  Long Term Goals  Time Frame for Long Term Goals : 3 weeks  Long Term Goal 1: Patient will complete supine < > sit with modified independence to transfer in and out of bed safely.  Long Term Goal 2: Patient will complete sit < > stand with modified independence to stand to ambulate safely.  Long Term Goal 3: Patient will complete bed

## 2024-03-24 NOTE — PLAN OF CARE
Problem: Pain  Goal: Verbalizes/displays adequate comfort level or baseline comfort level  Outcome: Progressing  Flowsheets (Taken 3/23/2024 2045)  Verbalizes/displays adequate comfort level or baseline comfort level: Encourage patient to monitor pain and request assistance     Problem: Skin/Tissue Integrity  Goal: Absence of new skin breakdown  Description: 1.  Monitor for areas of redness and/or skin breakdown  2.  Assess vascular access sites hourly  3.  Every 4-6 hours minimum:  Change oxygen saturation probe site  4.  Every 4-6 hours:  If on nasal continuous positive airway pressure, respiratory therapy assess nares and determine need for appliance change or resting period.  Outcome: Progressing     Problem: ABCDS Injury Assessment  Goal: Absence of physical injury  Outcome: Progressing

## 2024-03-24 NOTE — PLAN OF CARE
Problem: Discharge Planning  Goal: Discharge to home or other facility with appropriate resources  3/24/2024 1413 by Simona Bah RN  Outcome: Progressing  Flowsheets (Taken 3/24/2024 1413)  Discharge to home or other facility with appropriate resources:   Identify barriers to discharge with patient and caregiver   Arrange for needed discharge resources and transportation as appropriate   Identify discharge learning needs (meds, wound care, etc)   Refer to discharge planning if patient needs post-hospital services based on physician order or complex needs related to functional status, cognitive ability or social support system  Note: Patients discharge scheduled for 3/30 but services have not been determined at this time.  3/24/2024 0043 by Jasmine Ware, RN  Outcome: Progressing  Flowsheets (Taken 3/23/2024 2045)  Discharge to home or other facility with appropriate resources: Identify barriers to discharge with patient and caregiver     Problem: Pain  Goal: Verbalizes/displays adequate comfort level or baseline comfort level  3/24/2024 1413 by Simona Bah RN  Outcome: Progressing  Flowsheets (Taken 3/24/2024 1413)  Verbalizes/displays adequate comfort level or baseline comfort level:   Encourage patient to monitor pain and request assistance   Assess pain using appropriate pain scale   Administer analgesics based on type and severity of pain and evaluate response   Implement non-pharmacological measures as appropriate and evaluate response   Notify Licensed Independent Practitioner if interventions unsuccessful or patient reports new pain  Note: Patients pain is controlled with the current pain regimen.  3/24/2024 0043 by Jasmine Ware, RN  Outcome: Progressing  Flowsheets (Taken 3/23/2024 2045)  Verbalizes/displays adequate comfort level or baseline comfort level: Encourage patient to monitor pain and request assistance     Problem: Skin/Tissue Integrity  Goal: Absence of new skin  Co-morbidities  Goal: Patient's chronic conditions and co-morbidity symptoms are monitored and maintained or improved  3/24/2024 1413 by Simona Bah, RN  Outcome: Progressing  Flowsheets (Taken 3/24/2024 1413)  Care Plan - Patient's Chronic Conditions and Co-Morbidity Symptoms are Monitored and Maintained or Improved:   Monitor and assess patient's chronic conditions and comorbid symptoms for stability, deterioration, or improvement   Collaborate with multidisciplinary team to address chronic and comorbid conditions and prevent exacerbation or deterioration   Update acute care plan with appropriate goals if chronic or comorbid symptoms are exacerbated and prevent overall improvement and discharge  3/24/2024 0043 by Jasmine Ware, RN  Outcome: Progressing  Flowsheets (Taken 3/23/2024 2045)  Care Plan - Patient's Chronic Conditions and Co-Morbidity Symptoms are Monitored and Maintained or Improved: Monitor and assess patient's chronic conditions and comorbid symptoms for stability, deterioration, or improvement

## 2024-03-24 NOTE — PROGRESS NOTES
[facility]  Occupational Therapy  Daily Note  Time:   Time In: 1030  Time Out: 1100  Timed Code Treatment Minutes: 30 Minutes  Minutes: 30          Date: 3/24/2024  Patient Name: Deloris Watts,   Gender: female      Room: [facility]17/017-A  MRN: [id number]  : 1955  (68 y.o.)  Referring Practitioner: Jax Whatley MD  Diagnosis: Closed fracture of neck of left femur, initial encounter  Additional Pertinent Hx: Per H&P:Deloris Watts  is a 68 y.o. right-handed  female with history of hypertension, hyperlipidemia, hydronephrosis, fibromyalgia, irritable bowel syndrome, osteoporosis, end-stage renal disease on hemodialysis (MWF) since 2023, stroke with left side weakness in , anemia, depression, anxiety, right wrist and right ankle fracture treated conservatively, right proximal humeral fracture due to fall requiring ORIF, status post right carpal tunnel release surgery, status post cervical spine surgery, status post hysterectomy and bilateral oophorectomy, hemorrhoidectomy, sinus surgery, tubal ligation, L2 compression fracture requiring kyphoplasty, LASEK surgery, is admitted to the inpatient rehabilitation unit on 3/14/2024 for intensive inpatient rehabilitation treatment impairment and disability secondary to displaced left femur neck fracture as result of fall on 3/10/2024 requiring left hip hemiarthroplasty surgery.The patient says while she was pulling up her pant after using toilet in her home's bathroom on 3/10/2024, she suddenly lost balance and fell to the ground hitting her left hip.  She thinks she also hit her head to something at the time.  She did not loss consciousness.  She immediately feels severe pain at her left hip and was unable to get up even with her 's assistance.  Therefore 911 was called and the patient was sent to [facility] ER by EMS.  Multiple x-ray and CAT scan were done.  The

## 2024-03-25 LAB
ANION GAP SERPL CALC-SCNC: 16 MEQ/L (ref 8–16)
BUN SERPL-MCNC: 53 MG/DL (ref 7–22)
CALCIUM SERPL-MCNC: 9.1 MG/DL (ref 8.5–10.5)
CHLORIDE SERPL-SCNC: 98 MEQ/L (ref 98–111)
CO2 SERPL-SCNC: 24 MEQ/L (ref 23–33)
CREAT SERPL-MCNC: 6.9 MG/DL (ref 0.4–1.2)
GFR SERPL CREATININE-BSD FRML MDRD: 6 ML/MIN/1.73M2
GLUCOSE SERPL-MCNC: 85 MG/DL (ref 70–108)
HCT VFR BLD AUTO: 32.6 % (ref 37–47)
HGB BLD-MCNC: 9.9 GM/DL (ref 12–16)
POTASSIUM SERPL-SCNC: 4.2 MEQ/L (ref 3.5–5.2)
SODIUM SERPL-SCNC: 138 MEQ/L (ref 135–145)

## 2024-03-25 PROCEDURE — 97530 THERAPEUTIC ACTIVITIES: CPT

## 2024-03-25 PROCEDURE — 97535 SELF CARE MNGMENT TRAINING: CPT

## 2024-03-25 PROCEDURE — 1180000000 HC REHAB R&B

## 2024-03-25 PROCEDURE — 80048 BASIC METABOLIC PNL TOTAL CA: CPT

## 2024-03-25 PROCEDURE — 85014 HEMATOCRIT: CPT

## 2024-03-25 PROCEDURE — 85018 HEMOGLOBIN: CPT

## 2024-03-25 PROCEDURE — 6360000002 HC RX W HCPCS: Performed by: PHYSICAL MEDICINE & REHABILITATION

## 2024-03-25 PROCEDURE — 36415 COLL VENOUS BLD VENIPUNCTURE: CPT

## 2024-03-25 PROCEDURE — 97129 THER IVNTJ 1ST 15 MIN: CPT

## 2024-03-25 PROCEDURE — 97116 GAIT TRAINING THERAPY: CPT

## 2024-03-25 PROCEDURE — 99232 SBSQ HOSP IP/OBS MODERATE 35: CPT | Performed by: PHYSICAL MEDICINE & REHABILITATION

## 2024-03-25 PROCEDURE — 90935 HEMODIALYSIS ONE EVALUATION: CPT

## 2024-03-25 PROCEDURE — 97130 THER IVNTJ EA ADDL 15 MIN: CPT

## 2024-03-25 PROCEDURE — 99232 SBSQ HOSP IP/OBS MODERATE 35: CPT | Performed by: INTERNAL MEDICINE

## 2024-03-25 PROCEDURE — 6370000000 HC RX 637 (ALT 250 FOR IP): Performed by: PHYSICAL MEDICINE & REHABILITATION

## 2024-03-25 PROCEDURE — 97110 THERAPEUTIC EXERCISES: CPT

## 2024-03-25 RX ADMIN — AMLODIPINE BESYLATE 5 MG: 5 TABLET ORAL at 10:46

## 2024-03-25 RX ADMIN — HYDROCODONE BITARTRATE AND ACETAMINOPHEN 2 TABLET: 5; 325 TABLET ORAL at 06:39

## 2024-03-25 RX ADMIN — CLOPIDOGREL BISULFATE 75 MG: 75 TABLET ORAL at 10:46

## 2024-03-25 RX ADMIN — HEPARIN SODIUM 5000 UNITS: 5000 INJECTION INTRAVENOUS; SUBCUTANEOUS at 20:22

## 2024-03-25 RX ADMIN — TRAZODONE HYDROCHLORIDE 100 MG: 100 TABLET ORAL at 20:22

## 2024-03-25 RX ADMIN — MONTELUKAST SODIUM 10 MG: 10 TABLET ORAL at 10:47

## 2024-03-25 RX ADMIN — DOCUSATE SODIUM 100 MG: 100 CAPSULE, LIQUID FILLED ORAL at 10:46

## 2024-03-25 RX ADMIN — Medication 6 MG: at 20:22

## 2024-03-25 RX ADMIN — HYDROCODONE BITARTRATE AND ACETAMINOPHEN 2 TABLET: 5; 325 TABLET ORAL at 23:33

## 2024-03-25 RX ADMIN — FOLIC ACID 500 MCG: 1 TABLET ORAL at 10:46

## 2024-03-25 RX ADMIN — BISACODYL 10 MG: 10 SUPPOSITORY RECTAL at 18:20

## 2024-03-25 RX ADMIN — PANTOPRAZOLE SODIUM 40 MG: 40 TABLET, DELAYED RELEASE ORAL at 06:31

## 2024-03-25 RX ADMIN — NALOXEGOL OXALATE 25 MG: 25 TABLET, FILM COATED ORAL at 06:31

## 2024-03-25 RX ADMIN — MEGESTROL ACETATE 40 MG: 40 TABLET ORAL at 10:47

## 2024-03-25 RX ADMIN — ATORVASTATIN CALCIUM 10 MG: 10 TABLET, FILM COATED ORAL at 10:46

## 2024-03-25 RX ADMIN — BUPROPION HYDROCHLORIDE 300 MG: 300 TABLET, FILM COATED, EXTENDED RELEASE ORAL at 10:46

## 2024-03-25 RX ADMIN — SENNOSIDES 17.2 MG: 8.6 TABLET, FILM COATED ORAL at 20:22

## 2024-03-25 RX ADMIN — HEPARIN SODIUM 5000 UNITS: 5000 INJECTION INTRAVENOUS; SUBCUTANEOUS at 10:47

## 2024-03-25 RX ADMIN — DOCUSATE SODIUM 100 MG: 100 CAPSULE, LIQUID FILLED ORAL at 15:21

## 2024-03-25 RX ADMIN — SEVELAMER CARBONATE 800 MG: 800 TABLET, FILM COATED ORAL at 10:47

## 2024-03-25 RX ADMIN — DOCUSATE SODIUM 100 MG: 100 CAPSULE, LIQUID FILLED ORAL at 20:22

## 2024-03-25 RX ADMIN — ACETAMINOPHEN 650 MG: 325 TABLET ORAL at 20:22

## 2024-03-25 ASSESSMENT — PAIN DESCRIPTION - DESCRIPTORS
DESCRIPTORS: ACHING

## 2024-03-25 ASSESSMENT — ENCOUNTER SYMPTOMS
NAUSEA: 0
ABDOMINAL PAIN: 0
SHORTNESS OF BREATH: 0
WHEEZING: 0
RHINORRHEA: 0
TROUBLE SWALLOWING: 0
DIARRHEA: 0
CONSTIPATION: 0
VOMITING: 0
COUGH: 0
SORE THROAT: 0
BACK PAIN: 0

## 2024-03-25 ASSESSMENT — PAIN SCALES - GENERAL
PAINLEVEL_OUTOF10: 10
PAINLEVEL_OUTOF10: 10
PAINLEVEL_OUTOF10: 8
PAINLEVEL_OUTOF10: 0
PAINLEVEL_OUTOF10: 10
PAINLEVEL_OUTOF10: 10
PAINLEVEL_OUTOF10: 8

## 2024-03-25 ASSESSMENT — PAIN DESCRIPTION - ONSET
ONSET: GRADUAL

## 2024-03-25 ASSESSMENT — PAIN DESCRIPTION - ORIENTATION
ORIENTATION: LEFT

## 2024-03-25 ASSESSMENT — PAIN - FUNCTIONAL ASSESSMENT
PAIN_FUNCTIONAL_ASSESSMENT: ACTIVITIES ARE NOT PREVENTED
PAIN_FUNCTIONAL_ASSESSMENT: PREVENTS OR INTERFERES SOME ACTIVE ACTIVITIES AND ADLS
PAIN_FUNCTIONAL_ASSESSMENT: PREVENTS OR INTERFERES SOME ACTIVE ACTIVITIES AND ADLS
PAIN_FUNCTIONAL_ASSESSMENT: ACTIVITIES ARE NOT PREVENTED
PAIN_FUNCTIONAL_ASSESSMENT: PREVENTS OR INTERFERES SOME ACTIVE ACTIVITIES AND ADLS
PAIN_FUNCTIONAL_ASSESSMENT: PREVENTS OR INTERFERES SOME ACTIVE ACTIVITIES AND ADLS

## 2024-03-25 ASSESSMENT — PAIN DESCRIPTION - FREQUENCY
FREQUENCY: INTERMITTENT

## 2024-03-25 ASSESSMENT — PAIN DESCRIPTION - LOCATION
LOCATION: HIP

## 2024-03-25 ASSESSMENT — PAIN DESCRIPTION - PAIN TYPE
TYPE: CHRONIC PAIN
TYPE: CHRONIC PAIN
TYPE: CHRONIC PAIN;SURGICAL PAIN
TYPE: CHRONIC PAIN

## 2024-03-25 NOTE — PROGRESS NOTES
[facility]  INPATIENT PHYSICAL THERAPY  [facility] - 8K-17/017-A  Daily Note    Time In: 0900  Time Out: 0930  Timed Code Treatment Minutes: 30 Minutes  Minutes: 30          Date: 3/25/2024  Patient Name: Deloris Watts,  Gender:  female        MRN: [id number]  : 1955  (68 y.o.)  Referral Date : 24  Referring Practitioner: Jax Whatley MD  Diagnosis: Closed fracture of neck of left femur, initial encounter  Additional Pertinent Hx: Per H&P:Deloris Watts  is a 68 y.o. right-handed  female with history of hypertension, hyperlipidemia, hydronephrosis, fibromyalgia, irritable bowel syndrome, osteoporosis, end-stage renal disease on hemodialysis (MWF) since 2023, stroke with left side weakness in , anemia, depression, anxiety, right wrist and right ankle fracture treated conservatively, right proximal humeral fracture due to fall requiring ORIF, status post right carpal tunnel release surgery, status post cervical spine surgery, status post hysterectomy and bilateral oophorectomy, hemorrhoidectomy, sinus surgery, tubal ligation, L2 compression fracture requiring kyphoplasty, LASEK surgery, is admitted to the inpatient rehabilitation unit on 3/14/2024 for intensive inpatient rehabilitation treatment impairment and disability secondary to displaced left femur neck fracture as result of fall on 3/10/2024 requiring left hip hemiarthroplasty surgery.The patient says while she was pulling up her pant after using toilet in her home's bathroom on 3/10/2024, she suddenly lost balance and fell to the ground hitting her left hip.  She thinks she also hit her head to something at the time.  She did not loss consciousness.  She immediately feels severe pain at her left hip and was unable to get up even with her 's assistance.  Therefore 911 was called and the patient was sent to [facility] ER by EMS.  Multiple x-ray and CAT  Assistance, Contact Guard Assistance, X 1  *standing at RW prior to gait training to ensure balance and upright standing posture     Neuromuscular Re-education  None    Exercise:  Patient was guided in 1 set(s) x10-15 reps of exercises: Standing heel/toe raises, Standing marches, Standing hip abduction/adduction, Standing hip extension, Standing hamstring curls, Standing hip flexion, and Mini squats.  Exercises were completed for increased independence with functional mobility.    Functional Outcome Measures:   Not completed  Modified Karnes Scale:  Not Applicable     ASSESSMENT:  Assessment: Patient progressing toward established goals.  Activity Tolerance:  Patient tolerance of  treatment: good.     Equipment Recommendations:Equipment Needed: No  Discharge Recommendations: Continue to assess pending progress, Patient would benefit from continued therapy after discharge     PLAN: Current Treatment Recommendations: Strengthening, Balance training, Functional mobility training, Transfer training, Endurance training, Neuromuscular re-education, Gait training, Stair training, Home exercise program, Patient/Caregiver education & training, Safety education & training, Therapeutic activities, Equipment evaluation, education, & procurement, Wheelchair mobility training  General Plan:  (5x/wk 90min)    Patient Education  Patient Education: Plan of Care, Functional Mobility, Reviewed Prior Education, Health Promotion and Wellness Education, Safety, Verbal Exercise Instruction, Precautions/Restrictions, Equipment Education, Transfers, Gait, Use of Gait Belt, Home Safety Education, Activity Pacing, Postural Awareness,  - Patient Verbalized Understanding    Goals:  Patient Goals : go home and decrease pain  Short Term Goals  Time Frame for Short Term Goals: 1 week  Short Term Goal 1: Patient will complete supine < > sit with head of bed elevated ~10 degrees, no rail and min assist to transfer in and out of bed with decreased

## 2024-03-25 NOTE — CARE COORDINATION
Patient asked to get up into chair for dinner by student nurse, patient refused because she had been incontinent on herself and didn't want to get up. Nurse offered to get patient up and clean her up and get her changed and patient refused because she was in too much pain but did not want pain medication. Patient then put on call light to go to the bathroom this nurse entered and patient let nurse know that she had been incontinent and wanted to go to the bathroom but sat in the bed and continued to open a letter from the mail. Nurse asked if she was ready to get up and go to the bathroom and patient stated \"not until I'm done peeing\". Nurse was eventually able to get patient up but patient was unwilling to help get herself sitting at the side of the bed so she was MAX of 1. Patient sat on commode at bedside and didn't want to walk to the bathroom. Nurse gave suppository and patient got back in bed to \"wait for it to work\". During bedside shift report patient was found laying in bed with a glove on and pulling stool out of herself. Patient said \"the suppository is coming out and so is poop\". Nurse request patient get up and go to the bathroom which at first the patient declined. Eventually nurse talked patient into going to the toilet.

## 2024-03-25 NOTE — PROGRESS NOTES
increase in left hip pain; normal 5/5 muscle strength at the rest of bilateral upper & lower extremities  Reflex : 2+ bilateral biceps and bilateral brachioradialis reflexes; 0 bilateral knees reflexes  Pathological Reflex :  No Jossy's sign ; no ankle clonus;  Gait : Not assessed      Diagnostics:   Recent Results (from the past 24 hour(s))   Hemoglobin and Hematocrit    Collection Time: 03/25/24 11:11 AM   Result Value Ref Range    Hemoglobin 9.9 (L) 12.0 - 16.0 gm/dl    Hematocrit 32.6 (L) 37.0 - 47.0 %       Impression:  Status post fall on 3/10/2024 in the bathroom resulting  Displaced left femur neck fracture requiring left hip hemiarthroplasty surgery  Head scalp contusion with cerebral concussion without loss of consciousness  Cervical spine sprain and muscular strain  History of stroke with left hemiparesis  End-stage renal disease requiring hemodialysis  History of fall resulting liver laceration and acute L2 compression fracture requiring kyphoplasty  Hypertension  Irritable bowel syndrome  Hyperlipidemia  Osteoporosis  Allergic rhinitis  Vomiting episode possibly due to GERD  History of hydronephrosis  History of fibromyalgia  History of depression and anxiety  History of right proximal humerus fracture requiring ORIF  History of right wrist fracture requiring casting  History of right ankle fracture requiring casting  Cognitive impairment     The patient's overall condition remains stable.  She continues having pain at the left hip requiring taking Norco for pain control.  She continues having weakness at lower extremities especially the left side limited by the pain.  She continues tolerating the intensive rehabilitation treatment well.  Her functional improvement is progressing very slowly but steadily.    The patient is projected to be ready to be discharged on 3/30/2024.    Plan:  Continue intensive PT/OT/SLP/RT inpatient rehabilitation program at least 3 hours per day, 5 days per week in order  to improve functional status prior to discharge.  Family education and training will be completed.  Equipment evaluations and recommendations will be completed as appropriate.       Rehabilitation nursing continue to be involved for bowel, bladder, skin, and pain management.  Nursing will also provide education and training to patient and family.    Prophylaxis:  DVT: Subcutaneous heparin, ACE stocking, intermittent pneumatic compression device.  GI: Colace, Senokot, Enemeez enema, Movantik, Dulcolax suppository as needed, milk of magnesium as needed, GlycoLax as needed, Linzess as needed  Pain: Tylenol as needed, Norco 5/325 1-2 tab as needed, Voltaren gel as needed; add Voltaren gel application to right shoulder for right shoulder pain  Continue Norvasc for hypertension  Continue Lipitor for hyperlipidemia  Continue Wellbutrin XL for depression  Continue Singulair for allergic rhinitis  Continue Plavix for secondary stroke prevention  Continue sevelamer for hypocalcemia prevention in renal patient  Continue folic acid supplement  Continue Megace for poor appetite  Continues Protonix for possible GERD  Continue melatonin, trazodone for insomnia  Continue hemodialysis Monday, Wednesday and Friday under the direction of nephrology service  Nutrition: Continue current regular diet  Bladder: Monitoring signs or symptoms of UTI  Bowel: Monitoring signs or symptoms of constipation   and case management for coordination of care and discharge planning      Missed Therapy Time:  None      ANTON DAVIS MD     This report has been created using voice recognition software. It may contain minor errors which are inherent in voice recognition technology

## 2024-03-25 NOTE — FLOWSHEET NOTE
03/25/24 1419   Vital Signs   BP (!) 141/69   Temp 97.9 °F (36.6 °C)   Pulse 88   Respirations 16   SpO2 94 %   Weight - Scale 45.1 kg (99 lb 6.8 oz)   Weight Method Bed scale   Percent Weight Change -2.17   Pain Assessment   Pain Assessment None - Denies Pain   Post-Hemodialysis Assessment   Post-Treatment Procedures Blood returned;Catheter Capped, clamped with Saline x2 ports   Machine Disinfection Process Heat Disinfect;Acid/Vinegar Clean;Exterior Machine Disinfection   Rinseback Volume (ml) 400 ml   Blood Volume Processed (Liters) 58 L   Dialyzer Clearance Lightly streaked   Duration of Treatment (minutes) 90 minutes   Hemodialysis Output (ml) 1000 ml   Tolerated Treatment Good     2.5 hour TX completed. Removed 1 L of fluid. pt tolerated fluid removal well. Bilateral cath ports flushed with normal saline, clamped, and secured with tegos. CVC drsg clean, dry, and intact. Report called to primary RN. TX record printed for scanning into EMR.

## 2024-03-25 NOTE — CARE COORDINATION
Patient had bowel movement on 3/21, Enemeeze given on 3/23 but patient unable to have bowel movement. Patient wanted a second enemeeze 15 minutes after receiving first dose. Patient very concerned about not having a bowel movement today. Nurse went through bowel medications on MAR and found that Linzess should be taken daily for constipation but was only ordered PRN, obtained order to change to daily and first does to be given 3/24 in AM. Physician notified regarding not having a bowel movement.

## 2024-03-25 NOTE — PROGRESS NOTES
[facility]  Occupational Therapy  Progress Note  Time:   Time In: 930  Time Out: 1030  Timed Code Treatment Minutes: 60 Minutes  Minutes: 60          Date: 3/25/2024  Patient Name: Deloris Watts,   Gender: female      Room: [facility]17/017-A  MRN: [id number]  : 1955  (68 y.o.)  Referring Practitioner: Jax Whatley MD  Diagnosis: Closed fracture of neck of left femur, initial encounter  Additional Pertinent Hx: Per H&P:Deloris Watts  is a 68 y.o. right-handed  female with history of hypertension, hyperlipidemia, hydronephrosis, fibromyalgia, irritable bowel syndrome, osteoporosis, end-stage renal disease on hemodialysis (MWF) since 2023, stroke with left side weakness in , anemia, depression, anxiety, right wrist and right ankle fracture treated conservatively, right proximal humeral fracture due to fall requiring ORIF, status post right carpal tunnel release surgery, status post cervical spine surgery, status post hysterectomy and bilateral oophorectomy, hemorrhoidectomy, sinus surgery, tubal ligation, L2 compression fracture requiring kyphoplasty, LASEK surgery, is admitted to the inpatient rehabilitation unit on 3/14/2024 for intensive inpatient rehabilitation treatment impairment and disability secondary to displaced left femur neck fracture as result of fall on 3/10/2024 requiring left hip hemiarthroplasty surgery.The patient says while she was pulling up her pant after using toilet in her home's bathroom on 3/10/2024, she suddenly lost balance and fell to the ground hitting her left hip.  She thinks she also hit her head to something at the time.  She did not loss consciousness.  She immediately feels severe pain at her left hip and was unable to get up even with her 's assistance.  Therefore 911 was called and the patient was sent to [facility] ER by EMS.  Multiple x-ray and CAT scan were done.  The  indep within her home.     Following session, patient left in safe position with all fall risk precautions in place.    Hui WARNER/KENDRA [id number]

## 2024-03-25 NOTE — PLAN OF CARE
Problem: Pain  Goal: Verbalizes/displays adequate comfort level or baseline comfort level  Outcome: Progressing  Flowsheets (Taken 3/25/2024 1614)  Verbalizes/displays adequate comfort level or baseline comfort level:   Encourage patient to monitor pain and request assistance   Implement non-pharmacological measures as appropriate and evaluate response     Problem: Discharge Planning  Goal: Discharge to home or other facility with appropriate resources  3/25/2024 1614 by Cielo Almaraz  Outcome: Progressing  Flowsheets (Taken 3/25/2024 1614)  Discharge to home or other facility with appropriate resources:   Identify barriers to discharge with patient and caregiver   Identify discharge learning needs (meds, wound care, etc)     Problem: Safety - Adult  Goal: Free from fall injury  Outcome: Progressing  Flowsheets (Taken 3/25/2024 1614)  Free From Fall Injury:   Instruct family/caregiver on patient safety   Based on caregiver fall risk screen, instruct family/caregiver to ask for assistance with transferring infant if caregiver noted to have fall risk factors

## 2024-03-25 NOTE — PROGRESS NOTES
[facility]  INPATIENT REHABILITATION  TEAM CONFERENCE NOTE    Conference Date: 3/26/2024  Admit Date:  3/14/2024  5:39 PM  Patient Name: Deloris Watts    MRN: [id number]    : 1955  (68 y.o.)  Rehabilitation Admitting Diagnosis:  Closed fracture of neck of left femur, initial encounter ([facility]) [S72.002A]  Referring Practitioner: Jax Whatley MD      CASE MANAGEMENT  Current issues/needs regarding patient and family discharge status: Patient continues to be motivated and progressing with therapy. Patient plans to be discharged on Saturday, 3/30 with home health vs outpatient therapy pending patient's progress.    PHYSICAL THERAPY  Patient overall is making progress with skilled PT treatment and has met 3/5 STG's and 0/6 LTG's. Patient requires minimal assistance for bed mobility, SBA to CGA for sit<>stand transfers with use of RW and cueing for hand placement and SBA to CGA for ambulation with use of RW with decreased tolerance to weight shifting to the left with decreased step and stride length. Patient is able to ascend/descend 1 steps with Minimal assistance with cueing for proper technique. Patient has progressed to SBA for wheelchair mobility. Patient continues to demonstrate decreased strength in LLE and increased complaints of pain which limits her functional mobility. Patient overall can continue to benefit from skilled PT treatment in order to assist with BLE strengthening, gait training, transfer training, bed mobility, core strengthening and dynamic balance for increased functional mobility.  Equipment Needed: No    SPEECH THERAPY  The patient achieved 3/5 short-term and 1/1 long-term goals this reporting period. Patient with demonstrated gains in recall/working memory, executive functioning, problem solving, thought organization, and verbal/visual reasoning. Patient continues to present with higher level deficits in the aforementioned area. No dysarthria, dysphonia, dysphagia, or  transfer bench and elevated toilet seat  Factors facilitating achievement of predicted outcomes: Family support, Motivated, Cooperative, and Pleasant  Barriers to the achievement of predicted outcomes: Pain, Limited family support, Cognitive deficit, Impulsivity, Limited safety awareness, Limited insight into deficits, Unrealistic expectations, Decreased endurance, Lower extremity weakness, Medical complications, Stairs at home, Skin Care,  Wound Care, and need for hemodialysis  Follow up with physiatrist? no  If yes, what timeframe? N/A    Team Members Present at Conference:  :SHALINI To  Occupational Therapist:Celso SCHWARTZ, OTR/L [id number]  Physical Therapist:Filemon Rice, PT [id number]  Speech Therapist:Christine Rosario M.S., CCC-SLP [id number]  Nurse:Lo Carrington, FLY  Psychologist: Reina Mendoza, PhD    I approve the established interdisciplinary plan of care as documented within the medical record of Deloris Watts. I was present and led the interdisciplinary care conference.    ANTON DAVIS MD  Electronically signed by ANTON DAVIS MD on 3/26/2024 at 9:17 AM

## 2024-03-25 NOTE — PROGRESS NOTES
[facility]  INPATIENT SPEECH THERAPY  [facility]  PROGRESS NOTE    TIME   SLP Individual Minutes  Time In: 0830  Time Out: 0900  Minutes: 30  Timed Code Treatment Minutes: 30 Minutes     Date: 3/25/2024  Patient Name: Deloris Watts      CSN: [id number]   : 1955  (68 y.o.)  Gender: female   Referring Physician:  Dr. Jax Whatley  Diagnosis: Closed Fracture of Neck of Left Femur   Precautions: fall risk  Current Diet: Regular solids. Thin liquids.  Respiratory Status: Room Air  Swallowing Strategies: Standard Universal Swallow Precautions  Date of Last MBS/FEES: Not Applicable    Pain:  No pain reported.    Subjective: Patient sitting in recliner upon ST arrival. Patient agreeable to interventions; participatory and pleasant. No family present.    Short-Term Goals:  SHORT TERM GOAL #1:  Goal 1: Patient will complete delayed recall tasks of 3+ units of information (including Cog-Log) with 75% accuracy, with or without use of compensatory strategies, given mod cues, to improve retention of novel information. GOAL MET.  Revised Goal 1: Patient will complete delayed recall tasks of  4+ units of information with 80% accuracy, with or without use of compensatory strategies, given mod cues, to improve retention of novel information.   INTERVENTIONS:   Generation and Recall of Things Deloris is Looking Forward to at Home (Yorkie, chair, being home, OP therapy, ):  Immediate Recall: 5/5 independent with written visual aid  Additional ~20 Minute Delayed Recall: 3/5 independent, 1/5 min cue, 1/5 mod cue  *ST prompted patient to write information; used in 1/2 trials.    PREVIOUS SESSION:  Recall of Grocery List (milk, bananas, corn, pizza, shampoo, conditioner):  ~1 Day Delayed Recall: 2/6 independent, 3/6 min cues, 1/6 mod cues    SHORT TERM GOAL #2:  Goal 2: Patient will complete functional executive functioning (basic finances, medications), problem solving (time,

## 2024-03-25 NOTE — PLAN OF CARE
Problem: Discharge Planning  Goal: Discharge to home or other facility with appropriate resources  Note: SW notified by MONET Ames of patient's desires to do outpatient therapy. SW met with patient and Tenzin on this date. Patient reported wanting to do outpatient therapy at the [facility]. Patient previously used their outpatient services and would like to continue. Tenzin had questions regarding medications at discharge. SW will follow up. SW educated patient and Tenzin on family training. Tenzin feels comfortable and confident in the therapy he has participated in thus far. Tenzin is not requiring family training at this time. SW will follow and maintain involvement in discharge planning.

## 2024-03-25 NOTE — PROGRESS NOTES
[facility]   Nephrology progress note  3/25/2024, 10:54 AM      Pt Name:    Deloris Watts  MRN:     [id number]     YOB: 1955  Admit Date:    3/14/2024  5:39 PM    Chief Complaint: Nephrology following for ESRD on hemodialysis.    Subjective:  Patient seen and examined  No chest pain or shortness of breath  Feels okay. Rehab going ok .    Objective:  24HR INTAKE/OUTPUT:    Intake/Output Summary (Last 24 hours) at 3/25/2024 1054  Last data filed at 3/24/2024 2001  Gross per 24 hour   Intake 140 ml   Output --   Net 140 ml        Admission weight: 46.7 kg (103 lb)  Wt Readings from Last 3 Encounters:   03/25/24 46.1 kg (101 lb 10.1 oz)   03/13/24 42.7 kg (94 lb 2.2 oz)   02/22/24 47 kg (103 lb 9.6 oz)        Vitals :   Vitals:    03/25/24 0615 03/25/24 0639 03/25/24 0709 03/25/24 1043   BP:    129/63   Pulse:    89   Resp:  18 16 16   Temp:    97.3 °F (36.3 °C)   TempSrc:    Oral   SpO2:    98%   Weight: 46.1 kg (101 lb 10.1 oz)      Height: 1.524 m (5')          Physical examination  General Appearance:  Well developed. No distress  Mouth/Throat:  Oral mucosa moist  Neck:  Supple, no JVD  Lungs:  Breath sounds: clear  Heart::  S1,S2 heard  Abdomen:  Soft, non - tender  Musculoskeletal:  Edema - none    Medications:  Infusion:    sodium chloride      dextrose       Meds:    docusate sodium  100 mg Oral TID    bisacodyl  10 mg Rectal Daily    naloxegol  25 mg Oral QAM AC    linaclotide  290 mcg Oral QAM AC    megestrol  40 mg Oral Daily    pantoprazole  40 mg Oral QAM AC    traZODone  100 mg Oral Nightly    amLODIPine  5 mg Oral Daily    atorvastatin  10 mg Oral Daily    buPROPion  300 mg Oral Daily    clopidogrel  75 mg Oral Daily    docusate sodium  1 enema Rectal Daily    folic acid  500 mcg Oral Daily    heparin (porcine)  5,000 Units SubCUTAneous BID    montelukast  10 mg Oral Daily    senna  2 tablet Oral Nightly    sevelamer  800 mg Oral Lunch    melatonin  6 mg Oral Nightly

## 2024-03-25 NOTE — PROGRESS NOTES
Physical Medicine & Rehabilitation Progress Note    Chief Complaint:  Left hip pain due to left hip fracture requiring left hip hemiarthroplasty surgery    Subjective:    Deloris Watts is a 68 y.o. right-handed  female with history of hypertension, hyperlipidemia, hydronephrosis, fibromyalgia, irritable bowel syndrome, osteoporosis, end-stage renal disease on hemodialysis (MWF) since January 2023, stroke with left side weakness in 1990s, anemia, depression, anxiety, right wrist and right ankle fracture treated conservatively, right proximal humeral fracture due to fall requiring ORIF, status post right carpal tunnel release surgery, status post cervical spine surgery, status post hysterectomy and bilateral oophorectomy, hemorrhoidectomy, sinus surgery, tubal ligation, L2 compression fracture requiring kyphoplasty, LASEK surgery, was admitted on 3/14/2024 for intensive inpatient management of impairment & disability secondary to displaced left femur neck fracture as result of fall on 3/10/2024 requiring left hip hemiarthroplasty surgery.     The patient was previously in inpatient rehab service from 12/21/2023 to 1/5/2024 for intensive inpatient management of impairment & disability secondary to fall accident on 12/14/2023 resulting L2 compression fracture requiring kyphoplasty, and liver laceration.  She was discharged to home on 1/5/2024 with referral for outpatient PT, OT and speech therapy.     The patient says while she was pulling up her pant after using toilet in her home's bathroom on 3/10/2024, she suddenly lost balance and fell to the ground hitting her left hip.  She thinks she also hit her head to something at the time.  She did not loss consciousness.  She immediately feels severe pain at her left hip and was unable to get up even with her 's assistance.  Therefore 911 was called and the patient was sent to [facility] ER by EMS.  Multiple x-ray and CAT scan were done.

## 2024-03-25 NOTE — PROGRESS NOTES
Rolling Walker, pt demo step-to gait pattern    Pre-Gait: Weight shifting to increase WB on LLE, instructed in fwd step with LLE, x10 reps each activity. Pt reports increase in pain during activity.    Stairs:  Minimal Assistance, X 1, with cues for safety, with verbal cues , with increased time for completion, provided with demo and cues for correct sequencing, min A for RW placement  Number of Steps: 1 platform  Height: 2\" step with Rolling Walker    Wheelchair Mobility:  Stand By Assistance, with verbal cues , with increased time for completion  Extremities Used: Bilateral Upper Extremities and Right Lower Extremity  Type of Chair:Manual  Surface: Level Tile  Distance: 20ft  Quality: Slow Velocity, Short Strokes, Veers Right, Decreased Fluidity, and Pt demo increased difficulty maneuvering w/c    Balance:  None this date    Exercise:  Patient was guided in 1 set(s) 10 reps of exercise to both lower extremities.  Glut sets, Quad sets, Heelslides, Hip abduction/adduction, Seated marches, Seated hamstring curls, Seated heel/toe raises, Long arc quads, Seated isometric hip adduction, and Seated abduction/adduction RLE only due to increased difficulty to complete in sitting.  *Trialed orange tband with pt unable to complete full motion while seated. All exercises completed within hip precautions.   Exercises were completed for increased independence with functional mobility.    Functional Outcome Measures: Not completed     Modified Carolyn Scale:  Not Applicable    ASSESSMENT:  Assessment: Patient progressing toward established goals.    PT Assessment: Patient overall is making progress with skilled PT treatment and has met 3/5 STG's and 0/6 LTG's. Patient requires minimal assistance for bed mobility, SBA to CGA for sit<>stand transfers with use of RW and cueing for hand placement and SBA to CGA for ambulation with use of RW with decreased tolerance to weight shifting to the left with decreased step and stride length.  difficulty. GOAL MET  Short Term Goal 3: Patient will ambulate 20' with a RW and CGA to progress towards navigating home safely. GOAL MET  Short Term Goal 4: Patient will ascend/descend 2\" step in parallel bars with min assist to progress towards stair entry into home. GOAL MET  Short Term Goal 5: Patient will propel wheelchair 50' with SBA to increase mobility and independence on inpatient rehab unit GOAL NOT MET  Long Term Goals  Time Frame for Long Term Goals : 3 weeks  Long Term Goal 1: Patient will complete supine < > sit with modified independence to transfer in and out of bed safely.- GOAL NOT MET  Long Term Goal 2: Patient will complete sit < > stand with modified independence to stand to ambulate safely. GOAL NOT MET  Long Term Goal 3: Patient will complete bed < > chair transfer with a RW with modified independence to transfer surface to surface safely.  Long Term Goal 4: Patient will ambulate 50' with a RW with SBA to navigate home. GOAL NOT MET  Long Term Goal 5: Patient will complete car transfer with SBA to transfer in and out of vehicle safely.- GOAL NOT MET  Additional Goals?: Yes  Long term goal 6: Patient will ascend/descend 1 step with 1 handrail and CGA for home entry. GOAL NOT MET    Following session, patient left in safe position with all fall risk precautions in place.  Therapy session performed by Alexa CASTANEDA under supervision of credentialed therapist Elen Miller PTA.

## 2024-03-26 PROCEDURE — 99232 SBSQ HOSP IP/OBS MODERATE 35: CPT | Performed by: PHYSICAL MEDICINE & REHABILITATION

## 2024-03-26 PROCEDURE — 6360000002 HC RX W HCPCS: Performed by: PHYSICAL MEDICINE & REHABILITATION

## 2024-03-26 PROCEDURE — 97110 THERAPEUTIC EXERCISES: CPT

## 2024-03-26 PROCEDURE — 97129 THER IVNTJ 1ST 15 MIN: CPT

## 2024-03-26 PROCEDURE — 97130 THER IVNTJ EA ADDL 15 MIN: CPT

## 2024-03-26 PROCEDURE — 97535 SELF CARE MNGMENT TRAINING: CPT

## 2024-03-26 PROCEDURE — 97530 THERAPEUTIC ACTIVITIES: CPT

## 2024-03-26 PROCEDURE — 6370000000 HC RX 637 (ALT 250 FOR IP): Performed by: PHYSICAL MEDICINE & REHABILITATION

## 2024-03-26 PROCEDURE — 1180000000 HC REHAB R&B

## 2024-03-26 PROCEDURE — 99232 SBSQ HOSP IP/OBS MODERATE 35: CPT | Performed by: INTERNAL MEDICINE

## 2024-03-26 RX ORDER — MECLIZINE HCL 25MG 25 MG/1
25 TABLET, CHEWABLE ORAL 3 TIMES DAILY PRN
Status: DISCONTINUED | OUTPATIENT
Start: 2024-03-26 | End: 2024-03-30 | Stop reason: HOSPADM

## 2024-03-26 RX ADMIN — TRAZODONE HYDROCHLORIDE 100 MG: 100 TABLET ORAL at 20:52

## 2024-03-26 RX ADMIN — DOCUSATE SODIUM 100 MG: 100 CAPSULE, LIQUID FILLED ORAL at 20:52

## 2024-03-26 RX ADMIN — MONTELUKAST SODIUM 10 MG: 10 TABLET ORAL at 08:26

## 2024-03-26 RX ADMIN — HEPARIN SODIUM 5000 UNITS: 5000 INJECTION INTRAVENOUS; SUBCUTANEOUS at 08:28

## 2024-03-26 RX ADMIN — Medication 6 MG: at 20:53

## 2024-03-26 RX ADMIN — CLOPIDOGREL BISULFATE 75 MG: 75 TABLET ORAL at 08:26

## 2024-03-26 RX ADMIN — BUPROPION HYDROCHLORIDE 300 MG: 300 TABLET, FILM COATED, EXTENDED RELEASE ORAL at 08:26

## 2024-03-26 RX ADMIN — HEPARIN SODIUM 5000 UNITS: 5000 INJECTION INTRAVENOUS; SUBCUTANEOUS at 20:53

## 2024-03-26 RX ADMIN — ATORVASTATIN CALCIUM 10 MG: 10 TABLET, FILM COATED ORAL at 08:26

## 2024-03-26 RX ADMIN — SEVELAMER CARBONATE 800 MG: 800 TABLET, FILM COATED ORAL at 12:49

## 2024-03-26 RX ADMIN — PANTOPRAZOLE SODIUM 40 MG: 40 TABLET, DELAYED RELEASE ORAL at 07:27

## 2024-03-26 RX ADMIN — DOCUSATE SODIUM 100 MG: 100 CAPSULE, LIQUID FILLED ORAL at 08:26

## 2024-03-26 RX ADMIN — FOLIC ACID 500 MCG: 1 TABLET ORAL at 08:27

## 2024-03-26 RX ADMIN — SENNOSIDES 17.2 MG: 8.6 TABLET, FILM COATED ORAL at 20:52

## 2024-03-26 RX ADMIN — ONDANSETRON 4 MG: 4 TABLET, ORALLY DISINTEGRATING ORAL at 10:38

## 2024-03-26 RX ADMIN — NALOXEGOL OXALATE 25 MG: 25 TABLET, FILM COATED ORAL at 07:27

## 2024-03-26 RX ADMIN — HYDROCODONE BITARTRATE AND ACETAMINOPHEN 1 TABLET: 5; 325 TABLET ORAL at 06:17

## 2024-03-26 RX ADMIN — AMLODIPINE BESYLATE 5 MG: 5 TABLET ORAL at 08:22

## 2024-03-26 RX ADMIN — DOCUSATE SODIUM 100 MG: 100 CAPSULE, LIQUID FILLED ORAL at 15:31

## 2024-03-26 RX ADMIN — ACETAMINOPHEN 650 MG: 325 TABLET ORAL at 20:53

## 2024-03-26 RX ADMIN — MEGESTROL ACETATE 40 MG: 40 TABLET ORAL at 12:04

## 2024-03-26 RX ADMIN — BISACODYL 10 MG: 10 SUPPOSITORY RECTAL at 20:53

## 2024-03-26 ASSESSMENT — PAIN SCALES - GENERAL
PAINLEVEL_OUTOF10: 7
PAINLEVEL_OUTOF10: 7

## 2024-03-26 ASSESSMENT — ENCOUNTER SYMPTOMS
VOMITING: 0
SORE THROAT: 0
CONSTIPATION: 0
ABDOMINAL PAIN: 0
TROUBLE SWALLOWING: 0
SHORTNESS OF BREATH: 0
COUGH: 0
WHEEZING: 0
RHINORRHEA: 0
BACK PAIN: 0
DIARRHEA: 0
NAUSEA: 0

## 2024-03-26 ASSESSMENT — PAIN DESCRIPTION - ORIENTATION
ORIENTATION: LEFT
ORIENTATION: RIGHT

## 2024-03-26 ASSESSMENT — PAIN DESCRIPTION - DESCRIPTORS
DESCRIPTORS: SHARP
DESCRIPTORS: ACHING

## 2024-03-26 ASSESSMENT — PAIN - FUNCTIONAL ASSESSMENT: PAIN_FUNCTIONAL_ASSESSMENT: PREVENTS OR INTERFERES SOME ACTIVE ACTIVITIES AND ADLS

## 2024-03-26 ASSESSMENT — PAIN DESCRIPTION - LOCATION
LOCATION: KNEE
LOCATION: HIP

## 2024-03-26 NOTE — CARE COORDINATION
Patient became dizzy and nauseous during therapy this am. Brought back to room and given Zofran per prn orders. This nurse assisted patient to bathroom prior to therapy today, patient stopped before toilet and wouldn't move at all for a couple mins, telling this nurse to move her feet for her. After much encouragement from this nurse to stand up tall and take a few more steps patient able to get close enough to safely sit on toilet. Tech came to stay with patient for safety. No further concerns noted at this time. Patient resting in bed.

## 2024-03-26 NOTE — PROGRESS NOTES
RECREATIONAL THERAPY  [facility]      Date:  3/26/2024            Patient Name: Deloris Watts           MRN: [id number]  Acct: [id number]          YOB: 1955 (68 y.o.)       Gender: female   Diagnosis: Closed fracture of neck of left femur, initial encounter  Physician: Referring Practitioner: Jax Whatley MD    REASON FOR MISSED TREATMENT:  Pt asleep in bed with emesis bag close by-no RT today     Elena Tan, CTRS    3/26/2024

## 2024-03-26 NOTE — PLAN OF CARE
Problem: Discharge Planning  Goal: Discharge to home or other facility with appropriate resources  3/26/2024 0153 by Vaishnavi De La Fuente RN  Outcome: Progressing  Flowsheets (Taken 3/26/2024 0153)  Discharge to home or other facility with appropriate resources:   Identify barriers to discharge with patient and caregiver   Identify discharge learning needs (meds, wound care, etc)     Problem: Pain  Goal: Verbalizes/displays adequate comfort level or baseline comfort level  3/26/2024 0153 by Vaishnavi De La Fuente RN  Outcome: Progressing  Flowsheets  Taken 3/26/2024 0153 by Vaishnavi De La Fuente RN  Verbalizes/displays adequate comfort level or baseline comfort level:   Encourage patient to monitor pain and request assistance   Assess pain using appropriate pain scale   Administer analgesics based on type and severity of pain and evaluate response   Implement non-pharmacological measures as appropriate and evaluate response  Taken 3/25/2024 1737 by Cielo Almaraz  Verbalizes/displays adequate comfort level or baseline comfort level: Encourage patient to monitor pain and request assistance     Problem: Skin/Tissue Integrity  Goal: Absence of new skin breakdown  Description: 1.  Monitor for areas of redness and/or skin breakdown  Outcome: Progressing    Problem: Safety - Adult  Goal: Free from fall injury  3/26/2024 0153 by Vaishnavi De La Fuente RN  Outcome: Progressing  Flowsheets (Taken 3/26/2024 0153)  Free From Fall Injury: Instruct family/caregiver on patient safety     Problem: Chronic Conditions and Co-morbidities  Goal: Patient's chronic conditions and co-morbidity symptoms are monitored and maintained or improved  Outcome: Progressing  Flowsheets (Taken 3/26/2024 0153)  Care Plan - Patient's Chronic Conditions and Co-Morbidity Symptoms are Monitored and Maintained or Improved:   Monitor and assess patient's chronic conditions and comorbid symptoms for stability, deterioration, or improvement   Collaborate with multidisciplinary team to

## 2024-03-26 NOTE — PROGRESS NOTES
Term Goal 2: Patient will complete sit < > stand with modified independence to stand to ambulate safely.  Long Term Goal 3: Patient will complete bed < > chair transfer with a RW with modified independence to transfer surface to surface safely.  Long Term Goal 4: Patient will ambulate 50' with a RW with SBA to navigate home.  Long Term Goal 5: Patient will complete car transfer with SBA to transfer in and out of vehicle safely.  Additional Goals?: Yes  Long term goal 6: Patient will ascend/descend 1 step with 1 handrail and CGA for home entry.    Following session, patient left in safe position with all fall risk precautions in place.

## 2024-03-26 NOTE — PROGRESS NOTES
[facility]   Nephrology progress note  3/26/2024, 10:07 AM      Pt Name:    Deloris Watts  MRN:     [id number]     YOB: 1955  Admit Date:    3/14/2024  5:39 PM    Chief Complaint: Nephrology following for ESRD on hemodialysis.    Subjective:  Patient seen and examined  No chest pain or shortness of breath  Overall doing well rehab going well    Objective:  24HR INTAKE/OUTPUT:    Intake/Output Summary (Last 24 hours) at 3/26/2024 1007  Last data filed at 3/25/2024 2015  Gross per 24 hour   Intake 440 ml   Output 1000 ml   Net -560 ml        Admission weight: 46.7 kg (103 lb)  Wt Readings from Last 3 Encounters:   03/26/24 44.1 kg (97 lb 3.6 oz)   03/13/24 42.7 kg (94 lb 2.2 oz)   02/22/24 47 kg (103 lb 9.6 oz)        Vitals :   Vitals:    03/26/24 0617 03/26/24 0645 03/26/24 0647 03/26/24 0822   BP:    123/63   Pulse:    87   Resp: 16  16    Temp:       TempSrc:       SpO2:    97%   Weight:  44.1 kg (97 lb 3.6 oz)     Height:  1.524 m (5')         Physical examination  General Appearance:  Well developed. No distress  Mouth/Throat:  Oral mucosa moist  Neck:  Supple, no JVD  Lungs:  Breath sounds: clear  Heart::  S1,S2 heard  Abdomen:  Soft, non - tender  Musculoskeletal:  Edema - none    Medications:  Infusion:    sodium chloride      dextrose       Meds:    docusate sodium  100 mg Oral TID    bisacodyl  10 mg Rectal Daily    naloxegol  25 mg Oral QAM AC    linaclotide  290 mcg Oral QAM AC    megestrol  40 mg Oral Daily    pantoprazole  40 mg Oral QAM AC    traZODone  100 mg Oral Nightly    amLODIPine  5 mg Oral Daily    atorvastatin  10 mg Oral Daily    buPROPion  300 mg Oral Daily    clopidogrel  75 mg Oral Daily    folic acid  500 mcg Oral Daily    heparin (porcine)  5,000 Units SubCUTAneous BID    montelukast  10 mg Oral Daily    senna  2 tablet Oral Nightly    sevelamer  800 mg Oral Lunch    melatonin  6 mg Oral Nightly       Lab Data :  CBC:   Recent Labs     03/25/24  1111    HGB 9.9*   HCT 32.6*       CMP:  Recent Labs     03/24/24  0644 03/25/24  0604    138   K 4.2 4.2   CL 97* 98   CO2 25 24   BUN 42* 53*   CREATININE 5.5* 6.9*   GLUCOSE 136* 85   CALCIUM 9.0 9.1       Hepatic: No results for input(s): \"LABALBU\", \"AST\", \"ALT\", \"ALB\", \"BILITOT\", \"ALKPHOS\" in the last 72 hours.    Assessment and Plan:  Renal -ESRD on hemodialysis Monday Wednesday Friday  Volume status and electrolytes reasonably stable  No acute need for dialysis today BMP in the morning  Electrolytes -within normal limits  Mild acidosis improved with the dialysis  Essential hypertension running well  Fracture femur status post surgery 3/11/2024 undergoing rehab  Meds reviewed and discussed with patient     To Cuadra MD  [facility]    This report has been created using voice recognition software. It may contain minor errors which are inherent in voice recognition technology

## 2024-03-26 NOTE — PROGRESS NOTES
Patient: Deloris Watts  Unit/Bed: 8K-17/017-A  YOB: 1955  MRN: [id number] Acct: [id number]   Admitting Diagnosis: Closed fracture of neck of left femur, initial encounter ([facility]) [S72.002A]  Admit Date:  3/14/2024  Hospital Day: 11    Assessment:     Principal Problem:    Closed fracture of neck of left femur, initial encounter ([facility])  Active Problems:    Depression    Anxiety disorder    History of CVA (cerebrovascular accident)    Hyperlipidemia    Allergic rhinitis    Essential hypertension    Generalized anxiety disorder    ESRD on hemodialysis ([facility])    Chronic constipation  Resolved Problems:    * No resolved hospital problems. *      Plan:     Continue to follow        Subjective:     Patient has no complaint of CP or SOB..   Medication side effects: none    Scheduled Meds:   docusate sodium  100 mg Oral TID    bisacodyl  10 mg Rectal Daily    naloxegol  25 mg Oral QAM AC    linaclotide  290 mcg Oral QAM AC    megestrol  40 mg Oral Daily    pantoprazole  40 mg Oral QAM AC    traZODone  100 mg Oral Nightly    amLODIPine  5 mg Oral Daily    atorvastatin  10 mg Oral Daily    buPROPion  300 mg Oral Daily    clopidogrel  75 mg Oral Daily    folic acid  500 mcg Oral Daily    heparin (porcine)  5,000 Units SubCUTAneous BID    montelukast  10 mg Oral Daily    senna  2 tablet Oral Nightly    sevelamer  800 mg Oral Lunch    melatonin  6 mg Oral Nightly     Continuous Infusions:   sodium chloride      dextrose       PRN Meds:sodium chloride, dextrose, dextrose bolus **OR** dextrose bolus, docusate sodium, glucagon (rDNA), glucose, HYDROcodone 5 mg - acetaminophen **OR** HYDROcodone 5 mg - acetaminophen, magnesium hydroxide, ondansetron, polyethylene glycol, sodium chloride flush, acetaminophen, diclofenac sodium    Review of Systems  Pertinent items are noted in HPI.    Objective:     Patient Vitals for the past 8 hrs:   BP Temp Temp src Pulse Resp SpO2 Weight   03/25/24 2002 100/79 99.3 °F (37.4 °C) Oral

## 2024-03-26 NOTE — PROGRESS NOTES
[facility]  INPATIENT SPEECH THERAPY  [facility]  DAILY NOTE    TIME   SLP Individual Minutes  Time In: 0830  Time Out: 0900  Minutes: 30  Timed Code Treatment Minutes: 30 Minutes     Date: 3/26/2024  Patient Name: Deloris Watts      CSN: [id number]   : 1955  (68 y.o.)  Gender: female   Referring Physician:  Dr. Jax Whatley  Diagnosis: Closed Fracture of Neck of Left Femur   Precautions: fall risk  Current Diet: Regular solids. Thin liquids.  Respiratory Status: Room Air  Swallowing Strategies: Standard Universal Swallow Precautions  Date of Last MBS/FEES: Not Applicable    Pain:  No pain reported.    Subjective: Patient sitting in bed upon ST arrival. RN and Nursing students briefly present to give patient medications. Patient agreeable to interventions; participatory and pleasant. No family present.    Short-Term Goals:  SHORT TERM GOAL #1:  Goal 1: Patient will complete delayed recall tasks of  4+ units of information with 80% accuracy, with or without use of compensatory strategies, given mod cues, to improve retention of novel information.   INTERVENTIONS:   Recall of Things Deloris is Looking Forward to at Home (Yorkie, chair, being home, OP therapy, ):  Immediate Recall: 4/5 independent, 1/5 min cue  Additional ~20 Minute Delayed Recall: 4/5 independent, 1/5 min cue    SHORT TERM GOAL #2:  Goal 2: Patient will complete functional executive functioning (basic finances, medications), problem solving (time, money) and verbal/visual reasoning tasks with 80% accuracy at given min cues for potential return to PLOF.  INTERVENTIONS:   Generating Personal Medication List: ST verbally stated the name and dosage of a medication from patient's medication list. Patient wrote down this information, as well as listed the purpose and times taken.  Norvasc: Purpose: total assist, Time Taken: independent  Lipitor: Purpose: max cue, Time Taken: independent   Wellbutrin:

## 2024-03-26 NOTE — PROGRESS NOTES
Independent  Transfer Assistance: Independent    Active : No  Patient's  Info: Pt can drive, but  does all the driving     Additional Comments: Patient reports she walks with a rolling walker PTA. Patient was on IPR in Dec 2023 for 2 weeks after falling and breaking L2 vertebra.  very supportive and able to assist at discharge.  takes patient to dialysis M-W-F.    SUBJECTIVE: Pt seated in wheelchair, agreeable to OT with encouragement. Spouse, Tenzin, present throughout session. Pt voices feeling nauseated today. Dr. Whatley aware and pt medicated with Zofran by nursing during session.     PAIN: 7 /10: left hip     Vitals: During tub/ shower transfer. Pt exclaimed she felt \"I could pass out,\"  Blood Pressure: taken and found to be 107/51 while seated with pulse of 117 bpm.     COGNITION: Slow Processing, Decreased Recall, Decreased Insight, Impaired Memory, Decreased Problem Solving, and Decreased Safety Awareness    ADL:   Tub Transfer: OT facilitated discussed on shower transfers for home. Pt and spouse state they have a small bathroom with tub/shower unit.  OT guided patient in simulated tub/shower transfer, however, patient unable to flex knee to be able to clear a tub.  OT then showed patient and spouse a tub transfer bench, first demonstrating use, then patient returned demonstration with Min A needed to bring RLE over tub and moderate assist for LLE. While patient rested in the tub, OT discussed recommendation of a tub transfer bench for home with pt's spouse stating they may have one in the shed- will plan to follow up tomorrow. Pt's spouse assisted patient bringing LEs over tub using tub transfer bench to exit tub/shower.     Toileting: Minimal Assistance.   Pt stated she felt urge to have a BM, but unable to void during trial. Pt continent or urine.  Pt required min A to manage pants over hips.      Toilet Transfer: Stand By Assistance.  With use of grab bar    Grooming: Stand  By Assistance.   Standing at sink with cues needed to sequence washing her hands this date. Pt expressed nausea with standing and required emesis bag upon sitting down. Pt with nausea, but no emesis.     Following BADLs as above, patient's spouse expressed concern over caring for patient at home.  \"What if she doesn't listen to me?\"  OT provided support and encouragement and answered all questions to best of ability.  Patient's spouse's concerns relayed to Kusum MADDOX who came to speak with patient's spouse.      BALANCE:  Sitting Balance:  Stand By Assistance.    Standing Balance: Stand By Assistance, Contact Guard Assistance.      BED MOBILITY:  Sit to Supine: Minimal Assistance to bring LLE into bed     TRANSFERS:  Sit to Stand:  Stand By Assistance, Contact Guard Assistance, X 1, with increased time for completion, cues for hand placement.    Stand to Sit: Stand By Assistance. Cues for hand placement     FUNCTIONAL MOBILITY:  Assistive Device: Rolling Walker  Assist Level:  Contact Guard Assistance.   Distance:  within room, within tub/shower room, slow pace       EXERCISES:  Patient completed BUE strengthening exercises with skilled education on HEP: completed x10  reps x 1 set with a 2# weighted dowel lexi in all joints and all planes in order to improve UE strength and activity tolerance required for BADL routine and toilet / shower transfers. Patient tolerated fairly, requiring frequent rest breaks. Patient also required cues for technique.     Modified Cumberland Scale:  Not Applicable    ASSESSMENT:  Activity Tolerance:  Patient tolerance of  treatment: Fair treatment tolerance, Limited by medical complications, and Reduced activity pace       Discharge Recommendations: Home with Outpatient OT  Equipment Recommendations: Other: Recomned tub transfer bench- spouse looking to see if they have one. Also discussed elevated toilet seat.  Plan: Times Per Week: 5x/wk for 60 min  Current Treatment Recommendations:

## 2024-03-26 NOTE — PROGRESS NOTES
Patient: Deloris Watts  Unit/Bed: 8K-17/017-A  YOB: 1955  MRN: [id number] Acct: [id number]   Admitting Diagnosis: Closed fracture of neck of left femur, initial encounter ([facility]) [S72.002A]  Admit Date:  3/14/2024  Hospital Day: 12    Assessment:     Principal Problem:    Closed fracture of neck of left femur, initial encounter ([facility])  Active Problems:    Depression    Anxiety disorder    History of CVA (cerebrovascular accident)    Hyperlipidemia    Allergic rhinitis    Essential hypertension    Generalized anxiety disorder    ESRD on hemodialysis ([facility])    Chronic constipation  Resolved Problems:    * No resolved hospital problems. *      Plan:     Continue to follow        Subjective:     Patient has no complaint of CP or SOB..   Medication side effects: none    Scheduled Meds:   docusate sodium  100 mg Oral TID    bisacodyl  10 mg Rectal Daily    naloxegol  25 mg Oral QAM AC    linaclotide  290 mcg Oral QAM AC    megestrol  40 mg Oral Daily    pantoprazole  40 mg Oral QAM AC    traZODone  100 mg Oral Nightly    amLODIPine  5 mg Oral Daily    atorvastatin  10 mg Oral Daily    buPROPion  300 mg Oral Daily    clopidogrel  75 mg Oral Daily    folic acid  500 mcg Oral Daily    heparin (porcine)  5,000 Units SubCUTAneous BID    montelukast  10 mg Oral Daily    senna  2 tablet Oral Nightly    sevelamer  800 mg Oral Lunch    melatonin  6 mg Oral Nightly     Continuous Infusions:   sodium chloride      dextrose       PRN Meds:meclizine, sodium chloride, dextrose, dextrose bolus **OR** dextrose bolus, docusate sodium, glucagon (rDNA), glucose, HYDROcodone 5 mg - acetaminophen **OR** HYDROcodone 5 mg - acetaminophen, magnesium hydroxide, ondansetron, polyethylene glycol, sodium chloride flush, acetaminophen, diclofenac sodium    Review of Systems  Pertinent items are noted in HPI.    Objective:     No data found.  I/O last 3 completed shifts:  In: 1150 [P.O.:1150]  Out: 1000   No intake/output data

## 2024-03-26 NOTE — PLAN OF CARE
Problem: Discharge Planning  Goal: Discharge to home or other facility with appropriate resources  3/26/2024 1000 by Kusum Guajardo LISW  Note: Team conference held Tuesday, 3/26. Recommendations of the team were explained to the patient by Dr Whatley and VARSHA. Team is recommending that patient continue on acute inpatient rehab for PT, OT and ST, with expected discharge date of Saturday, 3/30. Following discharge, team is recommending Outpatient PT and OT. Care plan reviewed with patient.  Patient verbalized understanding of the plan of care and contributed to goal setting.     VARSHA spoke with Karime at [facility] to update them on patient's discharge date. Patient to resume diaylsis schedule of Mondays, Wednesdays and Fridays at 3:15 PM starting Monday, 4/1.    VARSHA spoke with Alisson at the [facility]. Referral was made. Staff to call VARSHA or Tenzin to schedule.    SW to follow and maintain involvement in discharge planning.

## 2024-03-26 NOTE — DISCHARGE INSTR - COC
Continuity of Care Form    Patient Name: Deloris Watts   :  1955  MRN:  [id number]    Admit date:  3/14/2024  Discharge date:  3/30/24    Code Status Order: Full Code   Advance Directives:     Admitting Physician:  Jax Whatley MD  PCP: Johana Weldon MD    Discharging Nurse: ***  Discharging Hospital Unit/Room#: 8K-17/017-A  Discharging Unit Phone Number: [phone]    Emergency Contact:   Extended Emergency Contact Information  Primary Emergency Contact: Tenzin Watts  Address: [address] [place] [id number] [facility]  Home Phone: [phone]  Mobile Phone: [phone]  Relation: Spouse   needed? No  Secondary Emergency Contact: ElizabethNicole   [facility]  Home Phone: [phone]  Mobile Phone: [phone]  Relation: Brother/Sister   needed? No    Past Surgical History:  Past Surgical History:   Procedure Laterality Date    CARPAL TUNNEL RELEASE Right     in     COLONOSCOPY      [facility]    CT BIOPSY RENAL  2022    CT BIOPSY RENAL 2022 [facility] CT SCAN    CYSTOSCOPY Left 2023    CYSTOSCOPY, LEFT URETERAL STENT PLACEMENT performed by Edilson Whalen MD at [facility] OR    ENDOSCOPY, COLON, DIAGNOSTIC  unsure    HEMORRHOID SURGERY  unsure    HIP SURGERY Left 3/11/2024    Left Misael Hip Arthroplasty performed by Virgilio Lezama DO at [facility] OR    HUMERUS FRACTURE SURGERY Right     ORIF    HYSTERECTOMY (CERVIX STATUS UNKNOWN)      age 38    LASIK      lasik    NECK SURGERY  18  years ago    cervical spine fusion ; in     OVARY REMOVAL Bilateral     age 38    SINUS SURGERY  10 years ago    SPINE SURGERY N/A 2023    KYPHOPLASTY L2 performed by Nimisha Garza MD at [facility] OR    TUBAL LIGATION      URETER SURGERY N/A 2023    CYSTOSCOPY, LEFT  URETEROSCOPY, LASER LITHOTRIPSIE OF STONES performed by Edilson Whalen MD at [facility] OR       Immunization History:   Immunization History   Administered Date(s)  documented pain score (0-10 scale): Pain Level: 7  Last Weight:   Wt Readings from Last 1 Encounters:   03/26/24 44.1 kg (97 lb 3.6 oz)     Mental Status:  oriented    IV Access:  - None    Nursing Mobility/ADLs:  Walking   Assisted  Transfer  Assisted  Bathing  Assisted  Dressing  Assisted  Toileting  Assisted  Feeding  Independent  Med Admin  Assisted  Med Delivery   whole    Wound Care Documentation and Therapy:  Incision 03/11/24 Hip Left (Active)   Dressing Status Old drainage noted 03/25/24 2002   Incision Cleansed Other (Comment) 03/13/24 0535   Dressing/Treatment Other (comment) 03/25/24 2002   Closure Other (Comment) 03/25/24 2002   Incision Assessment Other (Comment) 03/24/24 2029   Drainage Amount None (dry) 03/25/24 2002   Odor None 03/24/24 2029   Juliette-incision Assessment Intact 03/12/24 2128   Number of days: 14        Elimination:  Continence:   Bowel: Yes  Bladder: Yes  Urinary Catheter: None   Colostomy/Ileostomy/Ileal Conduit: No       Date of Last BM: 3/29/24    Intake/Output Summary (Last 24 hours) at 3/26/2024 0857  Last data filed at 3/25/2024 2015  Gross per 24 hour   Intake 440 ml   Output 1000 ml   Net -560 ml     I/O last 3 completed shifts:  In: 600 [P.O.:600]  Out: 1000     Safety Concerns:     At Risk for Falls    Impairments/Disabilities:      S/P left hip fracture from fall    Nutrition Therapy:  Current Nutrition Therapy:   - Oral Diet:  General    Routes of Feeding: Oral  Liquids: Thin Liquids  Daily Fluid Restriction: no  Last Modified Barium Swallow with Video (Video Swallowing Test): not done    Treatments at the Time of Hospital Discharge:   Respiratory Treatments: None  Oxygen Therapy:  is not on home oxygen therapy.  Ventilator:    - No ventilator support    Rehab Therapies: Physical Therapy and Occupational Therapy  Weight Bearing Status/Restrictions: No weight bearing restrictions  Other Medical Equipment (for information only, NOT a DME order):  walker  Other Treatments:

## 2024-03-26 NOTE — PLAN OF CARE
Problem: Discharge Planning  Goal: Discharge to home or other facility with appropriate resources  3/26/2024 1205 by Diana Bah LPN  Outcome: Progressing     Problem: Pain  Goal: Verbalizes/displays adequate comfort level or baseline comfort level  3/26/2024 1205 by Diana Bah LPN  Outcome: Progressing     Problem: Skin/Tissue Integrity  Goal: Absence of new skin breakdown  Description: 1.  Monitor for areas of redness and/or skin breakdown  2.  Assess vascular access sites hourly  3.  Every 4-6 hours minimum:  Change oxygen saturation probe site  4.  Every 4-6 hours:  If on nasal continuous positive airway pressure, respiratory therapy assess nares and determine need for appliance change or resting period.  3/26/2024 1205 by Diana Bah LPN  Outcome: Progressing     Problem: ABCDS Injury Assessment  Goal: Absence of physical injury  3/26/2024 1205 by Diana Bah LPN  Outcome: Progressing     Problem: Safety - Adult  Goal: Free from fall injury  3/26/2024 1205 by Diana Bah LPN  Outcome: Progressing     Problem: Nutrition Deficit:  Goal: Optimize nutritional status  3/26/2024 1205 by Diana Bah LPN  Outcome: Progressing     Problem: Chronic Conditions and Co-morbidities  Goal: Patient's chronic conditions and co-morbidity symptoms are monitored and maintained or improved  3/26/2024 1205 by Diana Bah LPN  Outcome: Progressing

## 2024-03-26 NOTE — DISCHARGE INSTRUCTIONS
time. Try to do this every 1 to 2 hours for the next 3 days (when you are awake). Put a thin cloth between the ice and your skin.     Your ankle may swell for about 3 months. Prop up your ankle when you ice it or anytime you sit or lie down. Try to keep it above the level of your heart. This will help reduce swelling.   Other instructions    Continue to wear your compression stockings as your doctor says. The length of time that you will have to wear them depends on your activity level and the amount of swelling you have. Most people wear these stockings for 4 to 6 weeks after surgery.     Follow these tips to prevent falls:  Arrange furniture so that you won't trip on it.  Get rid of throw rugs, and move electrical cords out of the way.  Walk only in areas with plenty of light.  Put grab bars in showers and bathtubs.  Try to avoid icy or snowy sidewalks. Choose shoes with good traction, or consider using traction devices that attach to your shoes.  Wear shoes with sturdy, flat soles.   Follow-up care is a key part of your treatment and safety. Be sure to make and go to all appointments, and call your doctor if you are having problems. It's also a good idea to know your test results and keep a list of the medicines you take.  When should you call for help?   Call 911 anytime you think you may need emergency care. For example, call if:    You passed out (lost consciousness).     You have severe trouble breathing.     You have sudden chest pain and shortness of breath, or you cough up blood.   Call your doctor now or seek immediate medical care if:    Your leg or foot is cool or pale or changes color.     You cannot feel or move your leg.     You have signs of a blood clot, such as:  Pain in your calf, back of the knee, thigh, or groin.  Redness and swelling in your leg or groin.     Your incision comes open and begins to bleed, or the bleeding increases.     You feel like your heart is racing or beating irregularly.      You have signs of infection, such as:  Increased pain, swelling, warmth, or redness.  Red streaks leading from the incision.  Pus draining from the incision.  A fever.   Watch closely for any changes in your health, and be sure to contact your doctor if:    You do not have a bowel movement after taking a laxative.     You do not get better as expected.   Where can you learn more?  Go to https://www.[employer].net/patientEd and enter R412 to learn more about \"Surgery to Repair a Hip Fracture: What to Expect at Home.\"  Current as of: July 17, 2023               Content Version: 14.0  © 2006-2024 [employer].   Care instructions adapted under license by [employer]. If you have questions about a medical condition or this instruction, always ask your healthcare professional. [employer] disclaims any warranty or liability for your use of this information.

## 2024-03-26 NOTE — PROGRESS NOTES
COGNITION: Slow Processing, Decreased Insight, Decreased Problem Solving, and Decreased Safety Awareness     ADL:   Tub Transfer: OT facilitated discussed on shower transfers for home. Pt and spouse state they have a small bathroom with tub/shower unit.  OT guided patient in simulated tub/shower transfer, however, patient unable to flex knee to be able to clear a tub.  OT then showed patient and spouse a tub transfer bench, first demonstrating use, then patient returned demonstration with Min A needed to bring RLE over tub and moderate assist for LLE. While patient rested in the tub, OT discussed recommendation of a tub transfer bench for home with pt's spouse stating they may have one in the shed- will plan to follow up tomorrow. Pt's spouse assisted patient bringing LEs over tub using tub transfer bench to exit tub/shower.      Toileting: Minimal Assistance.   Pt stated she felt urge to have a BM, but unable to void during trial. Pt continent or urine.  Pt required min A to manage pants over hips.       Toilet Transfer: Stand By Assistance.  With use of grab bar     Grooming: Stand By Assistance.   Standing at sink with cues needed to sequence washing her hands this date. Pt expressed nausea with standing and required emesis bag upon sitting down. Pt with nausea, but no emesis.      Following BADLs as above, patient's spouse expressed concern over caring for patient at home.  \"What if she doesn't listen to me?\"  OT provided support and encouragement and answered all questions to best of ability.  Patient's spouse's concerns relayed to Kusum MADDOX who came to speak with patient's spouse.      Upper Extremity Dressing: Minimal Assistance.  Buttoning cuffs .     IADL:   Laundry: Patient completed IADL laundry task that facilitated retrieving linens from graded planes and heights, including floor level, to challenge RW safety, standing endurance / balance using reacher. Pt was then able to stand and fold linens with  for appetite change, chills, diaphoresis and fever.   HENT:  Negative for hearing loss, rhinorrhea, sneezing, sore throat and trouble swallowing.    Eyes:  Negative for visual disturbance.   Respiratory:  Negative for cough, shortness of breath and wheezing.    Cardiovascular:  Negative for chest pain and palpitations.   Gastrointestinal:  Negative for abdominal pain, constipation, diarrhea, nausea and vomiting.   Genitourinary:  Positive for decreased urine volume. Negative for dysuria.   Musculoskeletal:  Positive for arthralgias (Left hip) and gait problem. Negative for back pain and neck pain.   Skin:  Negative for rash.   Neurological:  Positive for weakness (Left lower extremity). Negative for dizziness, tremors, speech difficulty, light-headedness, numbness and headaches.   Psychiatric/Behavioral:  Negative for dysphoric mood, hallucinations and sleep disturbance. The patient is not nervous/anxious.         Objective:  /63   Pulse 87   Temp 99.3 °F (37.4 °C) (Oral)   Resp 16   Ht 1.524 m (5')   Wt 44.1 kg (97 lb 3.6 oz)   SpO2 97%   BMI 18.99 kg/m²   Physical Exam   General:  well-developed, well nourished  female; in no acute distress ; appropriate affect & mood; lying on bed comfortably  Eyes: pupil equally round ; extra-ocular motion intact bilaterally  Head, Ear, Nose, Mouth & Throat : normocephalic ; no discharge from ears or nose ; no deformity ; no facial swelling ; oral mucosa pink   Neck :  supple ; no tenderness; mild muscle spasm at the bilateral upper trapezius muscles  Cardiovascular : regular rate & rhythm ; normal S1 & S2 heart sound ; no murmur ; presence of hemodialysis catheter at right anterior upper chest wall; normal peripheral pulse at the bilateral upper extremities; slightly reduced pulse strength at the bilateral lower extremities  Pulmonary : Breath sound present at bilateral lung field; no wheezing ; no rale; no rhonchi; no crackle  Gastrointestinal : soft,

## 2024-03-27 LAB
ANION GAP SERPL CALC-SCNC: 20 MEQ/L (ref 8–16)
BASOPHILS ABSOLUTE: 0.1 THOU/MM3 (ref 0–0.1)
BASOPHILS NFR BLD AUTO: 1.1 %
BUN SERPL-MCNC: 42 MG/DL (ref 7–22)
CALCIUM SERPL-MCNC: 9.4 MG/DL (ref 8.5–10.5)
CHLORIDE SERPL-SCNC: 95 MEQ/L (ref 98–111)
CO2 SERPL-SCNC: 22 MEQ/L (ref 23–33)
CREAT SERPL-MCNC: 6.3 MG/DL (ref 0.4–1.2)
DEPRECATED RDW RBC AUTO: 52.2 FL (ref 35–45)
EOSINOPHIL NFR BLD AUTO: 2.8 %
EOSINOPHILS ABSOLUTE: 0.3 THOU/MM3 (ref 0–0.4)
ERYTHROCYTE [DISTWIDTH] IN BLOOD BY AUTOMATED COUNT: 14.1 % (ref 11.5–14.5)
GFR SERPL CREATININE-BSD FRML MDRD: 7 ML/MIN/1.73M2
GLUCOSE SERPL-MCNC: 84 MG/DL (ref 70–108)
HCT VFR BLD AUTO: 32.9 % (ref 37–47)
HGB BLD-MCNC: 10.5 GM/DL (ref 12–16)
IMM GRANULOCYTES # BLD AUTO: 0.21 THOU/MM3 (ref 0–0.07)
IMM GRANULOCYTES NFR BLD AUTO: 1.7 %
LYMPHOCYTES ABSOLUTE: 2.5 THOU/MM3 (ref 1–4.8)
LYMPHOCYTES NFR BLD AUTO: 20.6 %
MAGNESIUM SERPL-MCNC: 2.7 MG/DL (ref 1.6–2.4)
MCH RBC QN AUTO: 32.5 PG (ref 26–33)
MCHC RBC AUTO-ENTMCNC: 31.9 GM/DL (ref 32.2–35.5)
MCV RBC AUTO: 101.9 FL (ref 81–99)
MONOCYTES ABSOLUTE: 0.8 THOU/MM3 (ref 0.4–1.3)
MONOCYTES NFR BLD AUTO: 6.4 %
NEUTROPHILS NFR BLD AUTO: 67.4 %
NRBC BLD AUTO-RTO: 0 /100 WBC
PLATELET # BLD AUTO: 640 THOU/MM3 (ref 130–400)
PMV BLD AUTO: 9.5 FL (ref 9.4–12.4)
POTASSIUM SERPL-SCNC: 4 MEQ/L (ref 3.5–5.2)
RBC # BLD AUTO: 3.23 MILL/MM3 (ref 4.2–5.4)
SEGMENTED NEUTROPHILS ABSOLUTE COUNT: 8.3 THOU/MM3 (ref 1.8–7.7)
SODIUM SERPL-SCNC: 137 MEQ/L (ref 135–145)
WBC # BLD AUTO: 12.3 THOU/MM3 (ref 4.8–10.8)

## 2024-03-27 PROCEDURE — 80048 BASIC METABOLIC PNL TOTAL CA: CPT

## 2024-03-27 PROCEDURE — 90935 HEMODIALYSIS ONE EVALUATION: CPT

## 2024-03-27 PROCEDURE — 83735 ASSAY OF MAGNESIUM: CPT

## 2024-03-27 PROCEDURE — 99232 SBSQ HOSP IP/OBS MODERATE 35: CPT | Performed by: PHYSICAL MEDICINE & REHABILITATION

## 2024-03-27 PROCEDURE — 6370000000 HC RX 637 (ALT 250 FOR IP): Performed by: PHYSICAL MEDICINE & REHABILITATION

## 2024-03-27 PROCEDURE — 97530 THERAPEUTIC ACTIVITIES: CPT

## 2024-03-27 PROCEDURE — 97535 SELF CARE MNGMENT TRAINING: CPT

## 2024-03-27 PROCEDURE — 97130 THER IVNTJ EA ADDL 15 MIN: CPT

## 2024-03-27 PROCEDURE — 97129 THER IVNTJ 1ST 15 MIN: CPT

## 2024-03-27 PROCEDURE — 85025 COMPLETE CBC W/AUTO DIFF WBC: CPT

## 2024-03-27 PROCEDURE — 6360000002 HC RX W HCPCS: Performed by: PHYSICAL MEDICINE & REHABILITATION

## 2024-03-27 PROCEDURE — 99232 SBSQ HOSP IP/OBS MODERATE 35: CPT | Performed by: INTERNAL MEDICINE

## 2024-03-27 PROCEDURE — 97110 THERAPEUTIC EXERCISES: CPT

## 2024-03-27 PROCEDURE — 1180000000 HC REHAB R&B

## 2024-03-27 PROCEDURE — 36415 COLL VENOUS BLD VENIPUNCTURE: CPT

## 2024-03-27 PROCEDURE — 97116 GAIT TRAINING THERAPY: CPT

## 2024-03-27 RX ADMIN — DOCUSATE SODIUM 100 MG: 100 CAPSULE, LIQUID FILLED ORAL at 22:03

## 2024-03-27 RX ADMIN — CLOPIDOGREL BISULFATE 75 MG: 75 TABLET ORAL at 08:23

## 2024-03-27 RX ADMIN — DOCUSATE SODIUM 100 MG: 100 CAPSULE, LIQUID FILLED ORAL at 17:13

## 2024-03-27 RX ADMIN — DOCUSATE SODIUM 100 MG: 100 CAPSULE, LIQUID FILLED ORAL at 08:23

## 2024-03-27 RX ADMIN — HYDROCODONE BITARTRATE AND ACETAMINOPHEN 1 TABLET: 5; 325 TABLET ORAL at 13:47

## 2024-03-27 RX ADMIN — Medication 6 MG: at 22:03

## 2024-03-27 RX ADMIN — FOLIC ACID 500 MCG: 1 TABLET ORAL at 08:23

## 2024-03-27 RX ADMIN — TRAZODONE HYDROCHLORIDE 100 MG: 100 TABLET ORAL at 22:03

## 2024-03-27 RX ADMIN — HEPARIN SODIUM 5000 UNITS: 5000 INJECTION INTRAVENOUS; SUBCUTANEOUS at 22:04

## 2024-03-27 RX ADMIN — MONTELUKAST SODIUM 10 MG: 10 TABLET ORAL at 08:23

## 2024-03-27 RX ADMIN — BUPROPION HYDROCHLORIDE 300 MG: 300 TABLET, FILM COATED, EXTENDED RELEASE ORAL at 08:23

## 2024-03-27 RX ADMIN — PANTOPRAZOLE SODIUM 40 MG: 40 TABLET, DELAYED RELEASE ORAL at 06:35

## 2024-03-27 RX ADMIN — ATORVASTATIN CALCIUM 10 MG: 10 TABLET, FILM COATED ORAL at 08:23

## 2024-03-27 RX ADMIN — NALOXEGOL OXALATE 25 MG: 25 TABLET, FILM COATED ORAL at 06:35

## 2024-03-27 RX ADMIN — HEPARIN SODIUM 5000 UNITS: 5000 INJECTION INTRAVENOUS; SUBCUTANEOUS at 08:23

## 2024-03-27 RX ADMIN — SENNOSIDES 17.2 MG: 8.6 TABLET, FILM COATED ORAL at 22:03

## 2024-03-27 RX ADMIN — MEGESTROL ACETATE 40 MG: 40 TABLET ORAL at 08:23

## 2024-03-27 RX ADMIN — SEVELAMER CARBONATE 800 MG: 800 TABLET, FILM COATED ORAL at 11:45

## 2024-03-27 RX ADMIN — HYDROCODONE BITARTRATE AND ACETAMINOPHEN 2 TABLET: 5; 325 TABLET ORAL at 06:40

## 2024-03-27 ASSESSMENT — ENCOUNTER SYMPTOMS
SHORTNESS OF BREATH: 0
SORE THROAT: 0
CONSTIPATION: 0
DIARRHEA: 0
RHINORRHEA: 0
COUGH: 0
NAUSEA: 0
TROUBLE SWALLOWING: 0
VOMITING: 0
WHEEZING: 0
ABDOMINAL PAIN: 0
BACK PAIN: 0

## 2024-03-27 ASSESSMENT — PAIN DESCRIPTION - LOCATION: LOCATION: LEG

## 2024-03-27 ASSESSMENT — PAIN DESCRIPTION - ORIENTATION: ORIENTATION: LEFT

## 2024-03-27 ASSESSMENT — PAIN SCALES - GENERAL
PAINLEVEL_OUTOF10: 9
PAINLEVEL_OUTOF10: 7

## 2024-03-27 ASSESSMENT — PAIN DESCRIPTION - DESCRIPTORS: DESCRIPTORS: ACHING

## 2024-03-27 NOTE — PROGRESS NOTES
[facility]  Occupational Therapy  Daily Note  Time:   Time In: 0700  Time Out: 0800  Timed Code Treatment Minutes: 60 Minutes  Minutes: 60          Date: 3/27/2024  Patient Name: Deloris Watts,   Gender: female      Room: [facility]17/017-A  MRN: [id number]  : 1955  (68 y.o.)  Referring Practitioner: Jax Whatley MD  Diagnosis: Closed fracture of neck of left femur, initial encounter  Additional Pertinent Hx: Per H&P:Deloris Watts  is a 68 y.o. right-handed  female with history of hypertension, hyperlipidemia, hydronephrosis, fibromyalgia, irritable bowel syndrome, osteoporosis, end-stage renal disease on hemodialysis (MWF) since 2023, stroke with left side weakness in , anemia, depression, anxiety, right wrist and right ankle fracture treated conservatively, right proximal humeral fracture due to fall requiring ORIF, status post right carpal tunnel release surgery, status post cervical spine surgery, status post hysterectomy and bilateral oophorectomy, hemorrhoidectomy, sinus surgery, tubal ligation, L2 compression fracture requiring kyphoplasty, LASEK surgery, is admitted to the inpatient rehabilitation unit on 3/14/2024 for intensive inpatient rehabilitation treatment impairment and disability secondary to displaced left femur neck fracture as result of fall on 3/10/2024 requiring left hip hemiarthroplasty surgery.The patient says while she was pulling up her pant after using toilet in her home's bathroom on 3/10/2024, she suddenly lost balance and fell to the ground hitting her left hip.  She thinks she also hit her head to something at the time.  She did not loss consciousness.  She immediately feels severe pain at her left hip and was unable to get up even with her 's assistance.  Therefore 911 was called and the patient was sent to [facility] ER by EMS.  Multiple x-ray and CAT scan were done.  The  Independent  Transfer Assistance: Independent    Active : No  Patient's  Info: Pt can drive, but  does all the driving     Additional Comments: Patient reports she walks with a rolling walker PTA. Patient was on IPR in Dec 2023 for 2 weeks after falling and breaking L2 vertebra.  very supportive and able to assist at discharge.  takes patient to dialysis M-W-F.    SUBJECTIVE: Pt was up in recliner upon arrival, pleasant and agreeable to OT. Pt reporting that she did not have a good day yesterday, but is feeling better today.     PAIN: 6/10    Vitals: Vitals not assessed per clinical judgement, see nursing flowsheet    ClOGNITION: Slow Processing, Decreased Insight, Decreased Problem Solving, and Decreased Safety Awareness    ADL:   Upper Extremity Dressing: Minimal Assistance.  Buttoning cuffs .    IADL:   Laundry: Patient completed IADL laundry task that facilitated retrieving linens from graded planes and heights, including floor level, to challenge RW safety, standing endurance / balance using reacher. Pt was then able to stand and fold linens with BUE release and no LOB. Pt required SBA throughout task and demo'ed and endurance of 5min minutes. Skilled education completed on RW safety techniques and fall prevention strategies with patient demo'ing good carry over, requiring additional min cues for RW safety.       BALANCE:  Sitting Balance:  Modified Independent. Supported in w/c  Standing Balance: Stand By Assistance. With 1-2 UE release that was intermittent.    BED MOBILITY:  Not Tested    TRANSFERS:  Sit to Stand:  Stand By Assistance. From various surfaces, good recall of hand placement  Stand to Sit: Stand By Assistance.      FUNCTIONAL MOBILITY:  Assistive Device: Rolling Walker  Assist Level:  Stand By Assistance.   Distance:  within room and within apartment  Very slow pace, VC for elongated steps     ADDITIONAL ACTIVITIES:  Patient completed BUE strengthening exercises

## 2024-03-27 NOTE — PROGRESS NOTES
Comprehensive Nutrition Assessment    Type and Reason for Visit:  Reassess    Nutrition Recommendations/Plan:   Continue current diet.  Increase Magic Cups to TID; trial Nepro once/day as back in stock.  Recommend renal MVI.  Continue Megace for appetite stimulation - consider increase dose as needed.  Encouraged po, good nutrition at best efforts.  Discussed food, protein bars from home as desired.  Consider renal diet once adequate oral intake established.  Renal diet compliance reinforced 3/12/24.     Malnutrition Assessment:  Malnutrition Status:  Moderate malnutrition (03/15/24 0938)    Context:  Chronic Illness     Findings of the 6 clinical characteristics of malnutrition:  Energy Intake:  75% or less estimated energy requirements for 1 month or longer  Weight Loss:  Unable to assess (pt ESRD on HD - fluctuates frequently)     Body Fat Loss:  Mild body fat loss Orbital, Triceps, Fat Overlying Ribs   Muscle Mass Loss:   (moderate losses) Temples (temporalis), Clavicles (pectoralis & deltoids), Scapula (trapezius)  Fluid Accumulation:  Mild Extremities   Strength:  Not Performed    Nutrition Assessment:     Pt. nutritionally compromised AEB moderate malnutrition.  At risk for further nutrition compromise r/t increased nutrient needs for wound healing, ESRD-HD and underlying medical condition (anemia in CKD, CHF, CVA in 1990s, HLD, HTN).      Nutrition Related Findings:      Wound Type:  (s/p Left Misael Hip Arthroplasty on 3/11/24)     Pt. Report/Treatments/Miscellaneous: pt. Seen - reports eating well at breakfast; states doesn't always feel like eating when meals come; denies any trouble tolerating diet; states acceptance of Magic Cups and would like to receive TID; prefers to trial Nepro ONS (as back in stock) instead of Ensure Compact; reports enjoying the protein bars she receives at HD  GI Status: BM 3/27  Pertinent Labs: 3/27: Glucose 84, BUn 42, Cr 6.3, Potassium 4.0  Pertinent Meds: Colace, Linzess,  Advancement/Tolerance, Food and Nutrient Intake, Supplement Intake  Physical Signs/Symptoms Outcomes: Biochemical Data, Chewing or Swallowing, GI Status, Fluid Status or Edema, Nutrition Focused Physical Findings, Skin, Weight    Discharge Planning:    Continue Oral Nutrition Supplement     Lurdes Lee RD, LD  Contact: [phone]

## 2024-03-27 NOTE — PROGRESS NOTES
Patient: Deloris Watts  Unit/Bed: 8K-17/017-A  YOB: 1955  MRN: [id number] Acct: [id number]   Admitting Diagnosis: Closed fracture of neck of left femur, initial encounter ([facility]) [S72.002A]  Admit Date:  3/14/2024  Hospital Day: 13    Assessment:     Principal Problem:    Closed fracture of neck of left femur, initial encounter ([facility])  Active Problems:    Depression    Anxiety disorder    History of CVA (cerebrovascular accident)    Hyperlipidemia    Allergic rhinitis    Essential hypertension    Generalized anxiety disorder    ESRD on hemodialysis ([facility])    Chronic constipation  Resolved Problems:    * No resolved hospital problems. *      Plan:     Continue to follow        Subjective:     Patient has no complaint of CP or SOB and she feels the left leg is getting better..   Medication side effects: none    Scheduled Meds:   docusate sodium  100 mg Oral TID    bisacodyl  10 mg Rectal Daily    naloxegol  25 mg Oral QAM AC    linaclotide  290 mcg Oral QAM AC    megestrol  40 mg Oral Daily    pantoprazole  40 mg Oral QAM AC    traZODone  100 mg Oral Nightly    amLODIPine  5 mg Oral Daily    atorvastatin  10 mg Oral Daily    buPROPion  300 mg Oral Daily    clopidogrel  75 mg Oral Daily    folic acid  500 mcg Oral Daily    heparin (porcine)  5,000 Units SubCUTAneous BID    montelukast  10 mg Oral Daily    senna  2 tablet Oral Nightly    sevelamer  800 mg Oral Lunch    melatonin  6 mg Oral Nightly     Continuous Infusions:   sodium chloride      dextrose       PRN Meds:meclizine, sodium chloride, dextrose, dextrose bolus **OR** dextrose bolus, docusate sodium, glucagon (rDNA), glucose, HYDROcodone 5 mg - acetaminophen **OR** HYDROcodone 5 mg - acetaminophen, magnesium hydroxide, ondansetron, polyethylene glycol, sodium chloride flush, acetaminophen, diclofenac sodium    Review of Systems  Pertinent items are noted in HPI.    Objective:     Patient Vitals for the past 8 hrs:   BP Temp Temp src Pulse Resp  SpO2 Weight   03/27/24 0811 (!) 130/51 97.3 °F (36.3 °C) Oral 80 18 100 % --   03/27/24 0710 -- -- -- -- 18 -- --   03/27/24 0640 -- -- -- -- 17 -- --   03/27/24 0515 -- -- -- -- -- -- 40.5 kg (89 lb 4.6 oz)     I/O last 3 completed shifts:  In: 630 [P.O.:630]  Out: -   No intake/output data recorded.    BP (!) 130/51   Pulse 80   Temp 97.3 °F (36.3 °C) (Oral)   Resp 18   Ht 1.524 m (5')   Wt 40.5 kg (89 lb 4.6 oz)   SpO2 100%   BMI 17.44 kg/m²     General appearance: alert, appears stated age, and cooperative  Head: Normocephalic, without obvious abnormality, atraumatic  Lungs: clear to auscultation bilaterally  Heart: regular rate and rhythm, S1, S2 normal, no murmur, click, rub or gallop  Abdomen: soft, non-tender; bowel sounds normal; no masses,  no organomegaly  Extremities: extremities normal, atraumatic, no cyanosis or edema  Skin: Skin color, texture, turgor normal. No rashes or lesions  Neurologic:  weak    Electronically signed by Gareth Mosquera MD on 3/27/2024 at 12:11 PM

## 2024-03-27 NOTE — PROGRESS NOTES
[facility]  INPATIENT PHYSICAL THERAPY  DAILY NOTE  [facility] - 8K-17/017-A    Time In: 0855  Time Out: 1025  Timed Code Treatment Minutes: 90 Minutes  Minutes: 90          Date: 3/27/2024  Patient Name: Deloris Watts,  Gender:  female        MRN: [id number]  : 1955  (68 y.o.)  Referral Date : 24  Referring Practitioner: Jax Whatley MD  Diagnosis: Closed fracture of neck of left femur, initial encounter  Additional Pertinent Hx: Per H&P:Deloris Watts  is a 68 y.o. right-handed  female with history of hypertension, hyperlipidemia, hydronephrosis, fibromyalgia, irritable bowel syndrome, osteoporosis, end-stage renal disease on hemodialysis (MWF) since 2023, stroke with left side weakness in , anemia, depression, anxiety, right wrist and right ankle fracture treated conservatively, right proximal humeral fracture due to fall requiring ORIF, status post right carpal tunnel release surgery, status post cervical spine surgery, status post hysterectomy and bilateral oophorectomy, hemorrhoidectomy, sinus surgery, tubal ligation, L2 compression fracture requiring kyphoplasty, LASEK surgery, is admitted to the inpatient rehabilitation unit on 3/14/2024 for intensive inpatient rehabilitation treatment impairment and disability secondary to displaced left femur neck fracture as result of fall on 3/10/2024 requiring left hip hemiarthroplasty surgery.The patient says while she was pulling up her pant after using toilet in her home's bathroom on 3/10/2024, she suddenly lost balance and fell to the ground hitting her left hip.  She thinks she also hit her head to something at the time.  She did not loss consciousness.  She immediately feels severe pain at her left hip and was unable to get up even with her 's assistance.  Therefore 911 was called and the patient was sent to [facility] ER by EMS.  Multiple x-ray and CAT  pain  Short Term Goals  Time Frame for Short Term Goals: 1 week  Short Term Goal 1: Patient will complete supine < > sit with head of bed elevated ~10 degrees, no rail and min assist to transfer in and out of bed with decreased difficulty.  Short Term Goal 2: Patient will complete sit  <> stand with CGA to stand to ambulate with decrease difficulty.  Short Term Goal 3: Patient will ambulate 20' with a RW and CGA to progress towards navigating home safely.  Short Term Goal 4: Patient will ascend/descend 2\" step in parallel bars with min assist to progress towards stair entry into home.  Short Term Goal 5: Patient will propel wheelchair 50' with SBA to increase mobility and independence on inpatient rehab unit  Long Term Goals  Time Frame for Long Term Goals : 3 weeks  Long Term Goal 1: Patient will complete supine < > sit with modified independence to transfer in and out of bed safely.  Long Term Goal 2: Patient will complete sit < > stand with modified independence to stand to ambulate safely.  Long Term Goal 3: Patient will complete bed < > chair transfer with a RW with modified independence to transfer surface to surface safely.  Long Term Goal 4: Patient will ambulate 50' with a RW with SBA to navigate home.  Long Term Goal 5: Patient will complete car transfer with SBA to transfer in and out of vehicle safely.  Additional Goals?: Yes  Long term goal 6: Patient will ascend/descend 1 step with 1 handrail and CGA for home entry.    Following session, patient left in safe position with all fall risk precautions in place.

## 2024-03-27 NOTE — PLAN OF CARE
Problem: Discharge Planning  Goal: Discharge to home or other facility with appropriate resources  3/26/2024 2349 by Josephine Ribeiro RN  Outcome: Progressing  Flowsheets (Taken 3/26/2024 2349)  Discharge to home or other facility with appropriate resources: Identify barriers to discharge with patient and caregiver     Problem: Pain  Goal: Verbalizes/displays adequate comfort level or baseline comfort level  3/26/2024 2349 by Josephine Ribeiro RN  Outcome: Progressing  Flowsheets (Taken 3/26/2024 2349)  Verbalizes/displays adequate comfort level or baseline comfort level:   Encourage patient to monitor pain and request assistance   Administer analgesics based on type and severity of pain and evaluate response     Problem: Skin/Tissue Integrity  Goal: Absence of new skin breakdown  Description: 1.  Monitor for areas of redness and/or skin breakdown  2.  Assess vascular access sites hourly  3.  Every 4-6 hours minimum:  Change oxygen saturation probe site  4.  Every 4-6 hours:  If on nasal continuous positive airway pressure, respiratory therapy assess nares and determine need for appliance change or resting period.  3/26/2024 2349 by Josephine Ribeiro RN  Outcome: Progressing     Problem: ABCDS Injury Assessment  Goal: Absence of physical injury  3/26/2024 2349 by Josephine Ribeiro RN  Outcome: Progressing  Flowsheets (Taken 3/26/2024 2349)  Absence of Physical Injury: Implement safety measures based on patient assessment  Note: Patient has remained free of physical injury during this shift. Safe environment provided, call light within reach, and hourly rounding completed.      Problem: Safety - Adult  Goal: Free from fall injury  3/26/2024 2349 by Josephine Ribeiro RN  Outcome: Progressing  Flowsheets (Taken 3/26/2024 2349)  Free From Fall Injury: Instruct family/caregiver on patient safety  Note: Patient has remained free of physical injury during this shift. Safe environment provided, call light

## 2024-03-27 NOTE — FLOWSHEET NOTE
03/27/24 1150 03/27/24 1445   Vital Signs   BP (!) 157/72 (!) 115/32   Temp 98.2 °F (36.8 °C) 98.7 °F (37.1 °C)   Pulse 81 76   Respirations 18 18   SpO2 100 %  --    Weight - Scale 40.5 kg (89 lb 4.6 oz) 39.5 kg (87 lb 1.3 oz)   Weight Method Stated Bed scale   Percent Weight Change 0 -2.47   Post-Hemodialysis Assessment   Post-Treatment Procedures  --  Blood returned;Catheter Capped, clamped with Saline x2 ports   Machine Disinfection Process  --  Acid/Vinegar Clean;Heat Disinfect;Exterior Machine Disinfection   Blood Volume Processed (Liters)  --  56.5 L   Dialyzer Clearance  --  Lightly streaked   Duration of Treatment (minutes)  --  150 minutes   Heparin Amount Administered During Treatment (mL)  --  0 mL   Hemodialysis Intake (ml)  --  400 ml   Hemodialysis Output (ml)  --  1400 ml   NET Removed (ml)  --  1000     completed 2.5 hr HD TX and removed 1 Liter of fluid. pt tolerated HD TX well. Dialysis TX ended, all blood returned with 400 mls rinse back, tego's attached. CVC dressing is clean, dry and intact. report given to primary nurse, record completed and printed to be scanned into pt's EMR.

## 2024-03-27 NOTE — PROGRESS NOTES
[facility]  INPATIENT SPEECH THERAPY  [facility]  DAILY NOTE    TIME   SLP Individual Minutes  Time In: 0830  Time Out: 0900  Minutes: 30  Timed Code Treatment Minutes: 30 Minutes     Date: 3/27/2024  Patient Name: Deloris Watts      CSN: [id number]   : 1955  (68 y.o.)  Gender: female   Referring Physician:  Dr. Jax Whatley  Diagnosis: Closed Fracture of Neck of Left Femur   Precautions: fall risk  Current Diet: Regular solids. Thin liquids.  Respiratory Status: Room Air  Swallowing Strategies: Standard Universal Swallow Precautions  Date of Last MBS/FEES: Not Applicable    Pain:  No pain reported.    Subjective: Patient sitting in recliner upon ST arrival. RN briefly present to give patient medications; patient and RN report difficulty swallowing pills this morning, needing to use pudding to help swallow pills. Patient reports feeling like mouth and throat were dry. Patient agreeable to interventions; participatory and pleasant. No family present.    Short-Term Goals:  SHORT TERM GOAL #1:  Goal 1: Patient will complete delayed recall tasks of  4+ units of information with 80% accuracy, with or without use of compensatory strategies, given mod cues, to improve retention of novel information.   INTERVENTIONS:   Recall of Things Deloris is Looking Forward to at Home (Yorkie, chair, being home, OP therapy, ):  ~1 Day Recall: 4/5 independent, 1/5 min cue    Recall of Medication Purpose when Given the Name: 4/11 independent, 2/11 min cues, 2/11 mod cues, 2/11 max cues, 1/11 total assist  *Fair carryover of medication regimen.      SHORT TERM GOAL #2:  Goal 2: Patient will complete functional executive functioning (basic finances, medications), problem solving (time, money) and verbal/visual reasoning tasks with 80% accuracy at given min cues for potential return to PLOF.  INTERVENTIONS:   Generating Personal Medication List: ST verbally stated the name and dosage  of a medication from patient's current medication list. Patient wrote down this information, as well as listed the purpose and times taken to promote learning, retention, and overall increased understanding of current medication regimen.  Senokot: Purpose: min cue, Time Taken: total assist  Renvela: Purpose: min cue, Time Taken: total assist   Trazodone: Purpose: independent, Time Taken: independent   Plavix: Purpose: independent , Time Taken: independent   *Good reasoning with verbal cues given for the purpose of 2 medications (\"similar to Linzess\")    Time-related Problem Solving (Thanksgiving Meal Prep): 6/9 independent, 1/9 min cue, 2/9 max cues  *Decreased mental math calculations r/t time.  *Good visual scanning of visual stimuli.    *Recommend continued assistance with medication management after discharge; patient reports  provides assistance at baseline.    SHORT TERM GOAL #3:  Goal 3: Patient will complete thought organization tasks with 75% accuracy given min cues to improve processing speed and organization during ADL tasks.  INTERVENTIONS: Goal not addressed r/t focus on other goals.    *Suspect patient may have difficulty navigating unfamiliar environments; recommend assistance.    SHORT TERM GOAL #4:  Goal 4: Patient will complete attention tasks (sustained, selective, alternating, divided) with no more than 5 errors/redirections given mod cues multi-tasking during ADL completion.   INTERVENTIONS: Good general sustained attention throughout tasks; increased participation from previous sessions. Good alternating attention between visual stimuli and questions in Thanksgiving meal prep task (see STG #2 for more details).    SHORT TERM GOAL #5:  Goal 5: Patient will adhere to modified low stimulation guidelines, including completion of the ACE, with score <8 over 3 consecutive days to maximize neurological healing. GOAL MET. NO NEW GOAL.   INTERVENTIONS:   Acute Concussion Evaluation (ACE): 4/22

## 2024-03-27 NOTE — PROGRESS NOTES
[facility]  Recreational Therapy  Daily Note  [facility]    Time Spent with Patient: 0 minutes    Date:  3/27/2024       Patient Name: Deloris Watts      MRN: [id number]      YOB: 1955 (68 y.o.)       Gender: female  Diagnosis: Closed fracture of neck of left femur, initial encounter  Referring Practitioner: Jax Whatley MD    RESTRICTIONS/PRECAUTIONS:  Restrictions/Precautions: Weight Bearing, Fall Risk     Hearing: Within functional limits    PAIN: 0    SUBJECTIVE:  Yes, thank you    OBJECTIVE:  Pt would like to get her hair washed and styled by our beautician tomorrow- also present-both appreciative         Patient Education  New Education Provided: Importance of Leisure,     Electronically signed by: Elena Tan, CTRS  Date: 3/27/2024

## 2024-03-27 NOTE — PLAN OF CARE
Problem: Discharge Planning  Goal: Discharge to home or other facility with appropriate resources  3/27/2024 1046 by Simona Bah RN  Outcome: Progressing  Flowsheets (Taken 3/27/2024 1046)  Discharge to home or other facility with appropriate resources:   Identify barriers to discharge with patient and caregiver   Arrange for needed discharge resources and transportation as appropriate   Identify discharge learning needs (meds, wound care, etc)   Refer to discharge planning if patient needs post-hospital services based on physician order or complex needs related to functional status, cognitive ability or social support system  Note: Patients discharge is planned for 3/30 home with outpatient therapies and assistance from husbands.  3/26/2024 2349 by Josephine Ribeiro, RN  Outcome: Progressing  Flowsheets (Taken 3/26/2024 2349)  Discharge to home or other facility with appropriate resources: Identify barriers to discharge with patient and caregiver     Problem: Pain  Goal: Verbalizes/displays adequate comfort level or baseline comfort level  3/27/2024 1046 by Simona Bah RN  Outcome: Progressing  Flowsheets (Taken 3/27/2024 1046)  Verbalizes/displays adequate comfort level or baseline comfort level:   Encourage patient to monitor pain and request assistance   Assess pain using appropriate pain scale  3/26/2024 2349 by Josephine Ribeiro RN  Outcome: Progressing  Flowsheets (Taken 3/26/2024 2349)  Verbalizes/displays adequate comfort level or baseline comfort level:   Encourage patient to monitor pain and request assistance   Administer analgesics based on type and severity of pain and evaluate response     Problem: Skin/Tissue Integrity  Goal: Absence of new skin breakdown  Description: 1.  Monitor for areas of redness and/or skin breakdown  2.  Assess vascular access sites hourly  3.  Every 4-6 hours minimum:  Change oxygen saturation probe site  4.  Every 4-6 hours:  If on nasal continuous

## 2024-03-27 NOTE — PROGRESS NOTES
[facility]   Nephrology progress note  3/27/2024, 10:12 AM      Pt Name:    Deloris Watts  MRN:     [id number]     YOB: 1955  Admit Date:    3/14/2024  5:39 PM    Chief Complaint: Nephrology following for ESRD on hemodialysis.    Subjective:  Patient seen and examined  No chest pain or shortness of breath  Undergoing rehab    Objective:  24HR INTAKE/OUTPUT:    Intake/Output Summary (Last 24 hours) at 3/27/2024 1012  Last data filed at 3/26/2024 1730  Gross per 24 hour   Intake 470 ml   Output --   Net 470 ml        Admission weight: 46.7 kg (103 lb)  Wt Readings from Last 3 Encounters:   03/27/24 40.5 kg (89 lb 4.6 oz)   03/13/24 42.7 kg (94 lb 2.2 oz)   02/22/24 47 kg (103 lb 9.6 oz)        Vitals :   Vitals:    03/27/24 0515 03/27/24 0640 03/27/24 0710 03/27/24 0811   BP:    (!) 130/51   Pulse:    80   Resp:  17 18 18   Temp:    97.3 °F (36.3 °C)   TempSrc:    Oral   SpO2:    100%   Weight: 40.5 kg (89 lb 4.6 oz)      Height:           Physical examination  General Appearance:  Well developed. No distress  Mouth/Throat:  Oral mucosa moist  Neck:  Supple, no JVD  Lungs:  Breath sounds: clear  Heart::  S1,S2 heard  Abdomen:  Soft, non - tender  Musculoskeletal:  Edema - none    Medications:  Infusion:    sodium chloride      dextrose       Meds:    docusate sodium  100 mg Oral TID    bisacodyl  10 mg Rectal Daily    naloxegol  25 mg Oral QAM AC    linaclotide  290 mcg Oral QAM AC    megestrol  40 mg Oral Daily    pantoprazole  40 mg Oral QAM AC    traZODone  100 mg Oral Nightly    amLODIPine  5 mg Oral Daily    atorvastatin  10 mg Oral Daily    buPROPion  300 mg Oral Daily    clopidogrel  75 mg Oral Daily    folic acid  500 mcg Oral Daily    heparin (porcine)  5,000 Units SubCUTAneous BID    montelukast  10 mg Oral Daily    senna  2 tablet Oral Nightly    sevelamer  800 mg Oral Lunch    melatonin  6 mg Oral Nightly       Lab Data :  CBC:   Recent Labs     03/25/24  1111

## 2024-03-28 LAB
LACTATE SERPL-SCNC: 1 MMOL/L (ref 0.5–2)
PROCALCITONIN SERPL IA-MCNC: 0.56 NG/ML (ref 0.01–0.09)

## 2024-03-28 PROCEDURE — 84145 PROCALCITONIN (PCT): CPT

## 2024-03-28 PROCEDURE — 97530 THERAPEUTIC ACTIVITIES: CPT

## 2024-03-28 PROCEDURE — 97110 THERAPEUTIC EXERCISES: CPT

## 2024-03-28 PROCEDURE — 99232 SBSQ HOSP IP/OBS MODERATE 35: CPT | Performed by: PHYSICAL MEDICINE & REHABILITATION

## 2024-03-28 PROCEDURE — 97116 GAIT TRAINING THERAPY: CPT

## 2024-03-28 PROCEDURE — 6370000000 HC RX 637 (ALT 250 FOR IP): Performed by: PHYSICAL MEDICINE & REHABILITATION

## 2024-03-28 PROCEDURE — 97535 SELF CARE MNGMENT TRAINING: CPT

## 2024-03-28 PROCEDURE — 83605 ASSAY OF LACTIC ACID: CPT

## 2024-03-28 PROCEDURE — 36415 COLL VENOUS BLD VENIPUNCTURE: CPT

## 2024-03-28 PROCEDURE — 6360000002 HC RX W HCPCS: Performed by: PHYSICAL MEDICINE & REHABILITATION

## 2024-03-28 PROCEDURE — 99232 SBSQ HOSP IP/OBS MODERATE 35: CPT | Performed by: INTERNAL MEDICINE

## 2024-03-28 PROCEDURE — 1180000000 HC REHAB R&B

## 2024-03-28 PROCEDURE — 97129 THER IVNTJ 1ST 15 MIN: CPT

## 2024-03-28 PROCEDURE — 97130 THER IVNTJ EA ADDL 15 MIN: CPT

## 2024-03-28 RX ADMIN — DOCUSATE SODIUM 100 MG: 100 CAPSULE, LIQUID FILLED ORAL at 20:07

## 2024-03-28 RX ADMIN — CLOPIDOGREL BISULFATE 75 MG: 75 TABLET ORAL at 10:01

## 2024-03-28 RX ADMIN — TRAZODONE HYDROCHLORIDE 100 MG: 100 TABLET ORAL at 20:07

## 2024-03-28 RX ADMIN — SEVELAMER CARBONATE 800 MG: 800 TABLET, FILM COATED ORAL at 12:35

## 2024-03-28 RX ADMIN — HYDROCODONE BITARTRATE AND ACETAMINOPHEN 2 TABLET: 5; 325 TABLET ORAL at 10:01

## 2024-03-28 RX ADMIN — SENNOSIDES 17.2 MG: 8.6 TABLET, FILM COATED ORAL at 20:07

## 2024-03-28 RX ADMIN — NALOXEGOL OXALATE 25 MG: 25 TABLET, FILM COATED ORAL at 05:52

## 2024-03-28 RX ADMIN — DOCUSATE SODIUM 100 MG: 100 CAPSULE, LIQUID FILLED ORAL at 10:02

## 2024-03-28 RX ADMIN — DOCUSATE SODIUM 100 MG: 100 CAPSULE, LIQUID FILLED ORAL at 17:23

## 2024-03-28 RX ADMIN — MONTELUKAST SODIUM 10 MG: 10 TABLET ORAL at 10:02

## 2024-03-28 RX ADMIN — HEPARIN SODIUM 5000 UNITS: 5000 INJECTION INTRAVENOUS; SUBCUTANEOUS at 11:00

## 2024-03-28 RX ADMIN — HEPARIN SODIUM 5000 UNITS: 5000 INJECTION INTRAVENOUS; SUBCUTANEOUS at 20:07

## 2024-03-28 RX ADMIN — Medication 6 MG: at 20:07

## 2024-03-28 RX ADMIN — MEGESTROL ACETATE 40 MG: 40 TABLET ORAL at 09:59

## 2024-03-28 RX ADMIN — PANTOPRAZOLE SODIUM 40 MG: 40 TABLET, DELAYED RELEASE ORAL at 05:52

## 2024-03-28 RX ADMIN — BUPROPION HYDROCHLORIDE 300 MG: 300 TABLET, FILM COATED, EXTENDED RELEASE ORAL at 09:59

## 2024-03-28 RX ADMIN — AMLODIPINE BESYLATE 5 MG: 5 TABLET ORAL at 09:59

## 2024-03-28 RX ADMIN — BISACODYL 10 MG: 10 SUPPOSITORY RECTAL at 17:30

## 2024-03-28 RX ADMIN — FOLIC ACID 500 MCG: 1 TABLET ORAL at 09:59

## 2024-03-28 RX ADMIN — ATORVASTATIN CALCIUM 10 MG: 10 TABLET, FILM COATED ORAL at 09:59

## 2024-03-28 ASSESSMENT — PAIN DESCRIPTION - DESCRIPTORS
DESCRIPTORS: ACHING
DESCRIPTORS: ACHING

## 2024-03-28 ASSESSMENT — PAIN DESCRIPTION - ORIENTATION
ORIENTATION: LEFT
ORIENTATION: LEFT

## 2024-03-28 ASSESSMENT — PAIN SCALES - GENERAL
PAINLEVEL_OUTOF10: 10
PAINLEVEL_OUTOF10: 10
PAINLEVEL_OUTOF10: 2
PAINLEVEL_OUTOF10: 2
PAINLEVEL_OUTOF10: 4

## 2024-03-28 ASSESSMENT — PAIN DESCRIPTION - LOCATION
LOCATION: LEG
LOCATION: LEG

## 2024-03-28 ASSESSMENT — ENCOUNTER SYMPTOMS
NAUSEA: 0
TROUBLE SWALLOWING: 0
SORE THROAT: 0
CONSTIPATION: 0
WHEEZING: 0
VOMITING: 0
DIARRHEA: 0
COUGH: 0
BACK PAIN: 0
RHINORRHEA: 0
ABDOMINAL PAIN: 0
SHORTNESS OF BREATH: 0

## 2024-03-28 NOTE — PROGRESS NOTES
[place]  Recreational Therapy  Daily Note  [facility]    Time Spent with Patient: 38 minutes    Date:  3/28/2024       Patient Name: Deloris Watts      MRN: [id number]      YOB: 1955 (68 y.o.)       Gender: female  Diagnosis: Closed fracture of neck of left femur, initial encounter  Referring Practitioner: Jax Whatley MD    RESTRICTIONS/PRECAUTIONS:  Restrictions/Precautions: Weight Bearing, Fall Risk     Hearing: Within functional limits    PAIN: states her pain is high-no number given    SUBJECTIVE:  I need this today    OBJECTIVE:  Pt enjoyed getting her hair washed and styled by our beautician this am-she was very pleasant and social-nurse assisted pt back to bed afterward due to increase pain        Patient Education  New Education Provided: Importance of Leisure,     Electronically signed by: ARA RodriguezS  Date: 3/28/2024

## 2024-03-28 NOTE — PROGRESS NOTES
[facility]  INPATIENT PHYSICAL THERAPY  DAILY NOTE  [facility] - 8K-17/017-A    Time In: 1330  Time Out: 1430  Timed Code Treatment Minutes: 60 Minutes  Minutes: 60          Date: 3/28/2024  Patient Name: Deloris Watts,  Gender:  female        MRN: [id number]  : 1955  (68 y.o.)  Referral Date : 24  Referring Practitioner: Jax Whatley MD  Diagnosis: Closed fracture of neck of left femur, initial encounter  Additional Pertinent Hx: Per H&P:Deloris Watts  is a 68 y.o. right-handed  female with history of hypertension, hyperlipidemia, hydronephrosis, fibromyalgia, irritable bowel syndrome, osteoporosis, end-stage renal disease on hemodialysis (MWF) since 2023, stroke with left side weakness in , anemia, depression, anxiety, right wrist and right ankle fracture treated conservatively, right proximal humeral fracture due to fall requiring ORIF, status post right carpal tunnel release surgery, status post cervical spine surgery, status post hysterectomy and bilateral oophorectomy, hemorrhoidectomy, sinus surgery, tubal ligation, L2 compression fracture requiring kyphoplasty, LASEK surgery, is admitted to the inpatient rehabilitation unit on 3/14/2024 for intensive inpatient rehabilitation treatment impairment and disability secondary to displaced left femur neck fracture as result of fall on 3/10/2024 requiring left hip hemiarthroplasty surgery.The patient says while she was pulling up her pant after using toilet in her home's bathroom on 3/10/2024, she suddenly lost balance and fell to the ground hitting her left hip.  She thinks she also hit her head to something at the time.  She did not loss consciousness.  She immediately feels severe pain at her left hip and was unable to get up even with her 's assistance.  Therefore 911 was called and the patient was sent to [facility] ER by EMS.  Multiple x-ray and CAT  complete supine < > sit with modified independence to transfer in and out of bed safely.  Long Term Goal 2: Patient will complete sit < > stand with modified independence to stand to ambulate safely.  Long Term Goal 3: Patient will complete bed < > chair transfer with a RW with modified independence to transfer surface to surface safely.  Long Term Goal 4: Patient will ambulate 50' with a RW with SBA to navigate home.  Long Term Goal 5: Patient will complete car transfer with SBA to transfer in and out of vehicle safely.  Additional Goals?: Yes  Long term goal 6: Patient will ascend/descend 1 step with 1 handrail and CGA for home entry.    Following session, patient left in safe position with all fall risk precautions in place.

## 2024-03-28 NOTE — FLOWSHEET NOTE
03/28/24 1017   Treatment Team Notification   Reason for Communication Change in status  (Labs value change (increase platelet, WBC's))   Name of Team Member Notified Dr Mosquera   Treatment Team Role Consulting Provider   Method of Communication Secure Message   Response See orders   Notification Time 1017     Patient WBC's and platelets increased from 3/19/24 - Dr Mosquera notified and orders received.

## 2024-03-28 NOTE — PROGRESS NOTES
[facility]   Nephrology progress note  3/28/2024, 10:04 AM      Pt Name:    Deloris Watts  MRN:     [id number]     YOB: 1955  Admit Date:    3/14/2024  5:39 PM    Chief Complaint: Nephrology following for ESRD on hemodialysis.    Subjective:  Patient seen and examined  No chest pain or shortness of breath  No other complaints.    Objective:  24HR INTAKE/OUTPUT:    Intake/Output Summary (Last 24 hours) at 3/28/2024 1004  Last data filed at 3/27/2024 1818  Gross per 24 hour   Intake 760 ml   Output 1400 ml   Net -640 ml        Admission weight: 46.7 kg (103 lb)  Wt Readings from Last 3 Encounters:   03/27/24 39.5 kg (87 lb 1.3 oz)   03/13/24 42.7 kg (94 lb 2.2 oz)   02/22/24 47 kg (103 lb 9.6 oz)        Vitals :   Vitals:    03/27/24 1516 03/27/24 2202 03/28/24 0945 03/28/24 1001   BP: (!) 123/52 (!) 130/56 125/66    Pulse: 91 95 (!) 102    Resp: 17 16 16 16   Temp:  98.4 °F (36.9 °C) 98.1 °F (36.7 °C)    TempSrc:  Oral     SpO2:  98% 100%    Weight:       Height:           Physical examination  General Appearance:  Well developed. No distress  Mouth/Throat:  Oral mucosa moist  Neck:  Supple, no JVD  Lungs:  Breath sounds: clear  Heart::  S1,S2 heard  Abdomen:  Soft, non - tender  Musculoskeletal:  Edema - none    Medications:  Infusion:    sodium chloride      dextrose       Meds:    docusate sodium  100 mg Oral TID    bisacodyl  10 mg Rectal Daily    naloxegol  25 mg Oral QAM AC    linaclotide  290 mcg Oral QAM AC    megestrol  40 mg Oral Daily    pantoprazole  40 mg Oral QAM AC    traZODone  100 mg Oral Nightly    amLODIPine  5 mg Oral Daily    atorvastatin  10 mg Oral Daily    buPROPion  300 mg Oral Daily    clopidogrel  75 mg Oral Daily    folic acid  500 mcg Oral Daily    heparin (porcine)  5,000 Units SubCUTAneous BID    montelukast  10 mg Oral Daily    senna  2 tablet Oral Nightly    sevelamer  800 mg Oral Lunch    melatonin  6 mg Oral Nightly       Lab Data :  CBC:    Recent Labs     03/25/24  1111 03/27/24  0617   WBC  --  12.3*   HGB 9.9* 10.5*   HCT 32.6* 32.9*   PLT  --  640*       CMP:  Recent Labs     03/27/24  0617      K 4.0   CL 95*   CO2 22*   BUN 42*   CREATININE 6.3*   GLUCOSE 84   CALCIUM 9.4   MG 2.7*       Hepatic: No results for input(s): \"LABALBU\", \"AST\", \"ALT\", \"ALB\", \"BILITOT\", \"ALKPHOS\" in the last 72 hours.    Assessment and Plan:  Renal -ESRD on hemodialysis Monday Wednesday Friday  Volume status and electrolytes reasonably stable  Had dialysis done yesterday, tolerated well net UF of 1000 mL  Electrolytes -within normal limits  Mild acidosis improved with the dialysis  Essential hypertension running well  Fracture femur status post surgery 3/11/2024 undergoing rehab  Meds reviewed and discussed with patient     To Cuadra MD  [facility]    This report has been created using voice recognition software. It may contain minor errors which are inherent in voice recognition technology

## 2024-03-28 NOTE — PROGRESS NOTES
Patient with left hip pain, but able to complete therapy. Noted increase in platelets and WBC's. Procalcitonin drawn and was elevated. Urine culture ordered. No increase drainage to hip dressing. Patient able to have large BM.

## 2024-03-28 NOTE — PROGRESS NOTES
[facility]  Recreational Therapy  Discharge Note  Inpatient Rehabilitation Unit         Date:  3/28/2024       Patient Name: Deloris Watts      MRN: [id number]       YOB: 1955 (68 y.o.)       Gender: female  Diagnosis: Closed fracture of neck of left femur, initial encounter  Referring Practitioner: Jax Whatley MD    Patient discharged from Recreational Therapy at this time.  See recreational therapy notes for details.    Electronically signed by: LOUIS Rodriguez  Date: 3/28/2024

## 2024-03-28 NOTE — PROGRESS NOTES
[facility]  INPATIENT PHYSICAL THERAPY  DAILY NOTE  [facility] - 8K-17/017-A    Time In: 0700  Time Out: 0730  Timed Code Treatment Minutes: 30 Minutes  Minutes: 30          Date: 3/28/2024  Patient Name: Deloris Watts,  Gender:  female        MRN: [id number]  : 1955  (68 y.o.)  Referral Date : 24  Referring Practitioner: Jax Whatley MD  Diagnosis: Closed fracture of neck of left femur, initial encounter  Additional Pertinent Hx: Per H&P:Deloris Watts  is a 68 y.o. right-handed  female with history of hypertension, hyperlipidemia, hydronephrosis, fibromyalgia, irritable bowel syndrome, osteoporosis, end-stage renal disease on hemodialysis (MWF) since 2023, stroke with left side weakness in , anemia, depression, anxiety, right wrist and right ankle fracture treated conservatively, right proximal humeral fracture due to fall requiring ORIF, status post right carpal tunnel release surgery, status post cervical spine surgery, status post hysterectomy and bilateral oophorectomy, hemorrhoidectomy, sinus surgery, tubal ligation, L2 compression fracture requiring kyphoplasty, LASEK surgery, is admitted to the inpatient rehabilitation unit on 3/14/2024 for intensive inpatient rehabilitation treatment impairment and disability secondary to displaced left femur neck fracture as result of fall on 3/10/2024 requiring left hip hemiarthroplasty surgery.The patient says while she was pulling up her pant after using toilet in her home's bathroom on 3/10/2024, she suddenly lost balance and fell to the ground hitting her left hip.  She thinks she also hit her head to something at the time.  She did not loss consciousness.  She immediately feels severe pain at her left hip and was unable to get up even with her 's assistance.  Therefore 911 was called and the patient was sent to [facility] ER by EMS.  Multiple x-ray and CAT

## 2024-03-28 NOTE — PLAN OF CARE
symptoms are monitored and maintained or improved  3/28/2024 0022 by Yessica Fu LPN  Outcome: Progressing  Flowsheets (Taken 3/28/2024 0022)  Care Plan - Patient's Chronic Conditions and Co-Morbidity Symptoms are Monitored and Maintained or Improved: Monitor and assess patient's chronic conditions and comorbid symptoms for stability, deterioration, or improvement

## 2024-03-28 NOTE — PLAN OF CARE
Problem: Skin/Tissue Integrity  Goal: Absence of new skin breakdown  Description: 1.  Monitor for areas of redness and/or skin breakdown  2.  Assess vascular access sites hourly  3.  Every 4-6 hours minimum:  Change oxygen saturation probe site  4.  Every 4-6 hours:  If on nasal continuous positive airway pressure, respiratory therapy assess nares and determine need for appliance change or resting period.  Outcome: Progressing  Note: No new skin breakdown. No redness noted.      Problem: Safety - Adult  Goal: Free from fall injury  Outcome: Progressing  Flowsheets (Taken 3/28/2024 4839)  Free From Fall Injury: Instruct family/caregiver on patient safety  Note: No falls this shift.

## 2024-03-28 NOTE — PROGRESS NOTES
Decreased Gait Speed, Decreased Heel Strike Bilaterally, Decreased Foot Clearance, Mild Path Deviations, Unsteady Gait, and Increased Reliance on Rolling Walker    Contact Guard Assistance  Distance: 40'x3, 20'x1, 65'x1  Surface: Level Tile  Device:Rolling Walker  Gait Deviations:  Forward Flexed Posture, Slow Anna, Decreased Step Length on Right, Decreased Weight Shift Left, Decreased Gait Speed, Decreased Heel Strike Bilaterally, Decreased Foot Clearance, Mild Path Deviations, Decreased Terminal Knee Extension, and Increased Reliance on Rolling Walker     Stairs:  Contact Guard Assistance, Minimal Assistance  Number of Steps: 1 platform step x 4 trials  Height: 6\" step with Rolling Walker     Balance:  Static Sitting Balance:  Supervision  Dynamic Sitting Balance: Supervision  Static Standing Balance: Contact Guard Assistance  Dynamic Standing Balance: Contact Guard Assistance      OT: Reviewed.    ClOGNITION: Slow Processing, Decreased Insight, Decreased Problem Solving, and Decreased Safety Awareness     ADL:   Grooming: Stand By Assistance.  With cues to initiate hand hygiene after toileting  Footwear Management: Moderate Assistance.  To don shoes seated EOB  Toileting: Stand By Assistance.     Toilet Transfer: Stand By Assistance.  .     IADL:   Home mgt tasks: Patient completed IADL task that facilitated walker safety while navigating around furniture in apartment to challenge RW safety, standing endurance / balance. Pt ambulated with SBA and min vcs for RW placement closer to surfaces before reaching forward to dust bedroom furniture.  Pt with improved tolerance of standing activities this date, voicing no nausea or dizziness. Skilled education completed on RW safety techniques and fall prevention strategies with patient demo'ing good carry over.      BALANCE:  Sitting Balance:  Modified Independent. Supported in w/c  Standing Balance: Stand By Assistance. With 1-2 UE release that was intermittent.    slowly but steadily.    The patient is projected to be ready to be discharged on 3/30/2024.    Plan:  Continue intensive PT/OT/SLP/RT inpatient rehabilitation program at least 3 hours per day, 5 days per week in order to improve functional status prior to discharge.  Family education and training will be completed.  Equipment evaluations and recommendations will be completed as appropriate.       Rehabilitation nursing continue to be involved for bowel, bladder, skin, and pain management.  Nursing will also provide education and training to patient and family.    Prophylaxis:  DVT: Subcutaneous heparin, ACE stocking, intermittent pneumatic compression device.  GI: Colace, Senokot, Enemeez enema, Movantik, Dulcolax suppository as needed, milk of magnesium as needed, GlycoLax as needed, Linzess as needed  Pain: Tylenol as needed, Norco 5/325 1-2 tab as needed, Voltaren gel as needed; add Voltaren gel application to right shoulder for right shoulder pain  Continue Norvasc for hypertension  Continue Lipitor for hyperlipidemia  Continue Wellbutrin XL for depression  Continue Singulair for allergic rhinitis  Continue Plavix for secondary stroke prevention  Continue sevelamer for hypocalcemia prevention in renal patient  Continue folic acid supplement  Continue Megace for poor appetite  Continues Protonix for possible GERD  Continue Antivert as needed for vertigo  Continue melatonin, trazodone for insomnia  Continue hemodialysis Monday, Wednesday and Friday under the direction of nephrology service  Nutrition: Continue current regular diet  Bladder: Monitoring signs or symptoms of UTI  Bowel: Monitoring signs or symptoms of constipation   and case management for coordination of care and discharge planning      Missed Therapy Time:  None      ANTON DAVIS MD     This report has been created using voice recognition software. It may contain minor errors which are inherent in voice recognition technology

## 2024-03-28 NOTE — PROGRESS NOTES
[facility]  INPATIENT SPEECH THERAPY  [facility]  DAILY NOTE    TIME   SLP Individual Minutes  Time In: 0800  Time Out: 0830  Minutes: 30  Timed Code Treatment Minutes: 30 Minutes     Date: 3/28/2024  Patient Name: Deloris Watts      CSN: [id number]   : 1955  (68 y.o.)  Gender: female   Referring Physician:  Dr. Jax Whatley  Diagnosis: Closed Fracture of Neck of Left Femur   Precautions: fall risk  Current Diet: Regular solids. Thin liquids.  Respiratory Status: Room Air  Swallowing Strategies: Standard Universal Swallow Precautions  Date of Last MBS/FEES: Not Applicable    Pain:  No pain reported.    Subjective: Patient sitting in recliner upon ST arrival. Patient agreeable to interventions; participatory and pleasant. No family present.    Short-Term Goals:  SHORT TERM GOAL #1:  Goal 1: Patient will complete delayed recall tasks of  4+ units of information with 80% accuracy, with or without use of compensatory strategies, given mod cues, to improve retention of novel information.   INTERVENTIONS:   Recall of Medication Purpose when Given the Name: / independent, 3/16 min cues, 4/16 mod cues, 3/16 total assist  *Fair carryover of medication regimen.      SHORT TERM GOAL #2:  Goal 2: Patient will complete functional executive functioning (basic finances, medications), problem solving (time, money) and verbal/visual reasoning tasks with 80% accuracy at given min cues for potential return to PLOF.  INTERVENTIONS:   Problem Solving ([place] Menu): 12 independent, 1/12 min cue, 2/12 mod cues, 1/12 max cue, 2/12 total assist  *Good visual scanning of the menu.  *Decreased mental math calculations and working memory.     Medication Label Interpretation: 7/10 independent, 1/10 min cue, 1/10 mod cue, 1/10 max cue  *Good visual scanning of medication label.  *Decreased reasoning of more open ended questions (I.e. \"Should Debbie take her medication with coffee or

## 2024-03-28 NOTE — FLOWSHEET NOTE
03/28/24 1347   Treatment Team Notification   Reason for Communication Change in status   Name of Team Member Notified ASLMA Doll   Treatment Team Role Consulting Provider   Method of Communication Secure Message   Response No new orders   Notification Time 1242     Clarifying orders for dressing change.

## 2024-03-28 NOTE — PROGRESS NOTES
[facility]  Occupational Therapy  Daily Note  Time:   Time In: 930  Time Out: 940  Timed Code Treatment Minutes: 10 Minutes  Minutes: 10          Date: 3/28/2024  Patient Name: Deloris Watts,   Gender: female      Room: [facility]17/017-A  MRN: [id number]  : 1955  (68 y.o.)  Referring Practitioner: Jax Whatley MD  Diagnosis: Closed fracture of neck of left femur, initial encounter  Additional Pertinent Hx: Per H&P:Deloris Watts  is a 68 y.o. right-handed  female with history of hypertension, hyperlipidemia, hydronephrosis, fibromyalgia, irritable bowel syndrome, osteoporosis, end-stage renal disease on hemodialysis (MWF) since 2023, stroke with left side weakness in , anemia, depression, anxiety, right wrist and right ankle fracture treated conservatively, right proximal humeral fracture due to fall requiring ORIF, status post right carpal tunnel release surgery, status post cervical spine surgery, status post hysterectomy and bilateral oophorectomy, hemorrhoidectomy, sinus surgery, tubal ligation, L2 compression fracture requiring kyphoplasty, LASEK surgery, is admitted to the inpatient rehabilitation unit on 3/14/2024 for intensive inpatient rehabilitation treatment impairment and disability secondary to displaced left femur neck fracture as result of fall on 3/10/2024 requiring left hip hemiarthroplasty surgery.The patient says while she was pulling up her pant after using toilet in her home's bathroom on 3/10/2024, she suddenly lost balance and fell to the ground hitting her left hip.  She thinks she also hit her head to something at the time.  She did not loss consciousness.  She immediately feels severe pain at her left hip and was unable to get up even with her 's assistance.  Therefore 911 was called and the patient was sent to [facility] ER by EMS.  Multiple x-ray and CAT scan were done.  The  Independent  Transfer Assistance: Independent    Active : No  Patient's  Info: Pt can drive, but  does all the driving     Additional Comments: Patient reports she walks with a rolling walker PTA. Patient was on IPR in Dec 2023 for 2 weeks after falling and breaking L2 vertebra.  very supportive and able to assist at discharge.  takes patient to dialysis M-W-F.    SUBJECTIVE: First attempt, patient eating breakfast meal.  OT returned and pt finished with meal consumption and agreeable to OT.      PAIN: 6/10    Vitals: Vitals not assessed per clinical judgement, see nursing flowsheet    ClOGNITION: Slow Processing, Decreased Insight, Decreased Problem Solving, and Decreased Safety Awareness    ADL:   Footwear Management: Moderate Assistance.  Pt able to don R sock and shoe with SBA, moderate asist needed for L shoe.     Pt able to state 3/3 hip precautions given visual prompts and increased time.     BALANCE:  Sitting Balance:  Modified Independent. Supported in chair  Standing Balance: Stand By Assistance.     TRANSFERS:  Sit to Stand:  Stand By Assistance. To re-adjust in chair   Stand to Sit: Stand By Assistance.      ASSESSMENT:  Activity Tolerance:  Patient tolerance of  treatment: Good treatment tolerance       Discharge Recommendations: Home with outpatient OT  Equipment Recommendations: Other: Recomned tub transfer bench- spouse looking to see if they have one. Also discussed elevated toilet seat.  Plan: Times Per Week: 5x/wk for 60 min  Current Treatment Recommendations: Strengthening, Balance training, Functional mobility training, Endurance training, Safety education & training, Self-Care / ADL, Home management training, Patient/Caregiver education & training, Equipment evaluation, education, & procurement, Coordination training    Education:  Learners: Patient  Plan of Care, ADL's, Energy Conservation, Reviewed Prior Education, and Importance of Increasing

## 2024-03-28 NOTE — PLAN OF CARE
Problem: Discharge Planning  Goal: Discharge to home or other facility with appropriate resources  Note: Transportation:   Has transportation kept you from medical appointments, meetings, work, or from getting things needed for daily living? (Check all that apply)  No.      Health Literacy:   How often do you need to have someone help you when you read instructions, pamphlets, or other written material from your doctor or pharmacy?  0. - Never    Social Isolation:  How often do you feel lonely or isolated from those around you?  0. Never      Patient Mood Interview (PHQ-2 to 9) (from [employer]©)   Say to Patient: \"Over the last 2 weeks, have you been bothered by any of the following problems?\"   If symptom is present, enter 1 (yes) in column 1 (Symptom Presence)  If yes in column 1, then ask the patient: “About how often have you been bothered by this?”  Read and show the patient a card with the symptom frequency choices.    Indicate response in column 2, Symptom Frequency.   Symptom Presence  No (enter 0 in column 2)   Yes (enter 0-3 in column 2)  9.    No response (leave column 2 blank) Symptom Frequency  Never or 1 day  2-6 days (several days)  7-11 days (half or more of the days)  12-14 days (nearly every day    Symptom Presence Symptom Frequency   Little interest or pleasure in doing things 1. Yes 1. 2-6 days (several days)   Feeling down, depressed, or hopeless 1. Yes 1. 2-6 days (several days)

## 2024-03-28 NOTE — PROGRESS NOTES
[facility]  Occupational Therapy  Daily Note  Time:   Time In: 1130  Time Out: 1220  Timed Code Treatment Minutes: 50 Minutes  Minutes: 50          Date: 3/28/2024  Patient Name: Deloris Watts,   Gender: female      Room: [facility]17/017-A  MRN: [id number]  : 1955  (68 y.o.)  Referring Practitioner: Jax Whatley MD  Diagnosis: Closed fracture of neck of left femur, initial encounter  Additional Pertinent Hx: Per H&P:Deloris Watts  is a 68 y.o. right-handed  female with history of hypertension, hyperlipidemia, hydronephrosis, fibromyalgia, irritable bowel syndrome, osteoporosis, end-stage renal disease on hemodialysis (MWF) since 2023, stroke with left side weakness in , anemia, depression, anxiety, right wrist and right ankle fracture treated conservatively, right proximal humeral fracture due to fall requiring ORIF, status post right carpal tunnel release surgery, status post cervical spine surgery, status post hysterectomy and bilateral oophorectomy, hemorrhoidectomy, sinus surgery, tubal ligation, L2 compression fracture requiring kyphoplasty, LASEK surgery, is admitted to the inpatient rehabilitation unit on 3/14/2024 for intensive inpatient rehabilitation treatment impairment and disability secondary to displaced left femur neck fracture as result of fall on 3/10/2024 requiring left hip hemiarthroplasty surgery.The patient says while she was pulling up her pant after using toilet in her home's bathroom on 3/10/2024, she suddenly lost balance and fell to the ground hitting her left hip.  She thinks she also hit her head to something at the time.  She did not loss consciousness.  She immediately feels severe pain at her left hip and was unable to get up even with her 's assistance.  Therefore 911 was called and the patient was sent to [facility] ER by EMS.  Multiple x-ray and CAT scan were done.  The  [place] Scale:  Not Applicable    ASSESSMENT:  Activity Tolerance:  Patient tolerance of  treatment: Good treatment tolerance       Discharge Recommendations: Home with Outpatient OT  Equipment Recommendations: Other: Recomned tub transfer bench- spouse looking to see if they have one. Also discussed elevated toilet seat.  Plan: Times Per Week: 5x/wk for 60 min  Current Treatment Recommendations: Strengthening, Balance training, Functional mobility training, Endurance training, Safety education & training, Self-Care / ADL, Home management training, Patient/Caregiver education & training, Equipment evaluation, education, & procurement, Coordination training    Education:  Learners: Patient  Plan of Care, ADL's, Energy Conservation, Reviewed Prior Education, and Importance of Increasing Activity    Goals  Short Term Goals  Time Frame for Short Term Goals: 1 week  Short Term Goal 1: Pt will complete LB dressing with LHAE and CGA to increase indep in home environment while maintaining hip precautions.  Short Term Goal 2: Pt will complete functional standing task x 5 minutes with 1 UE release and supervision to increase indep and endurance with all sinkside grooming and clothing management.  Short Term Goal 3: Pt will navigate to/from BR with SBA and min vcs for safety to increase indep and endurance with all toileting.  Short Term Goal 4: Pt will perform full body bathing with min A and AE PRN to increase indep in home environment.  Short Term Goal 5: Pt will demonstrate 3/3 hip precautions during ADL routine with no vcs to improve compliance with hip precautions.  Long Term Goals  Time Frame for Long Term Goals : 2 weeks from OT evaluation  Long Term Goal 1: Pt will complete BADL routine with set up assistance to improve indep with self care routine  Long Term Goal 2: Pt will use walker to complete light IADL task with supervision to improve indep within her home.    Following session, patient left in safe position with

## 2024-03-29 LAB
ANION GAP SERPL CALC-SCNC: 19 MEQ/L (ref 8–16)
BACTERIA URNS QL MICRO: ABNORMAL /HPF
BASOPHILS ABSOLUTE: 0.1 THOU/MM3 (ref 0–0.1)
BASOPHILS NFR BLD AUTO: 1.4 %
BILIRUB UR QL STRIP.AUTO: NEGATIVE
BUN SERPL-MCNC: 47 MG/DL (ref 7–22)
CALCIUM SERPL-MCNC: 9.1 MG/DL (ref 8.5–10.5)
CASTS #/AREA URNS LPF: ABNORMAL /LPF
CASTS 2: ABNORMAL /LPF
CHARACTER UR: CLEAR
CHLORIDE SERPL-SCNC: 98 MEQ/L (ref 98–111)
CO2 SERPL-SCNC: 24 MEQ/L (ref 23–33)
COLOR: YELLOW
CREAT SERPL-MCNC: 6.4 MG/DL (ref 0.4–1.2)
CRYSTALS URNS MICRO: ABNORMAL
DEPRECATED RDW RBC AUTO: 51.8 FL (ref 35–45)
EOSINOPHIL NFR BLD AUTO: 4.8 %
EOSINOPHILS ABSOLUTE: 0.4 THOU/MM3 (ref 0–0.4)
EPITHELIAL CELLS, UA: ABNORMAL /HPF
ERYTHROCYTE [DISTWIDTH] IN BLOOD BY AUTOMATED COUNT: 13.9 % (ref 11.5–14.5)
GFR SERPL CREATININE-BSD FRML MDRD: 7 ML/MIN/1.73M2
GLUCOSE SERPL-MCNC: 84 MG/DL (ref 70–108)
GLUCOSE UR QL STRIP.AUTO: 100 MG/DL
HCT VFR BLD AUTO: 29.2 % (ref 37–47)
HGB BLD-MCNC: 9.2 GM/DL (ref 12–16)
HGB UR QL STRIP.AUTO: NEGATIVE
IMM GRANULOCYTES # BLD AUTO: 0.12 THOU/MM3 (ref 0–0.07)
IMM GRANULOCYTES NFR BLD AUTO: 1.4 %
KETONES UR QL STRIP.AUTO: NEGATIVE
LYMPHOCYTES ABSOLUTE: 2.7 THOU/MM3 (ref 1–4.8)
LYMPHOCYTES NFR BLD AUTO: 30.6 %
MCH RBC QN AUTO: 31.9 PG (ref 26–33)
MCHC RBC AUTO-ENTMCNC: 31.5 GM/DL (ref 32.2–35.5)
MCV RBC AUTO: 101.4 FL (ref 81–99)
MISCELLANEOUS 2: ABNORMAL
MONOCYTES ABSOLUTE: 0.9 THOU/MM3 (ref 0.4–1.3)
MONOCYTES NFR BLD AUTO: 9.9 %
NEUTROPHILS NFR BLD AUTO: 51.9 %
NITRITE UR QL STRIP: NEGATIVE
NRBC BLD AUTO-RTO: 0 /100 WBC
PH UR STRIP.AUTO: 6.5 [PH] (ref 5–9)
PLATELET # BLD AUTO: 456 THOU/MM3 (ref 130–400)
PMV BLD AUTO: 9.4 FL (ref 9.4–12.4)
POTASSIUM SERPL-SCNC: 4.2 MEQ/L (ref 3.5–5.2)
PROCALCITONIN SERPL IA-MCNC: 0.56 NG/ML (ref 0.01–0.09)
PROT UR STRIP.AUTO-MCNC: 100 MG/DL
RBC # BLD AUTO: 2.88 MILL/MM3 (ref 4.2–5.4)
RBC URINE: ABNORMAL /HPF
RENAL EPI CELLS #/AREA URNS HPF: ABNORMAL /[HPF]
SEGMENTED NEUTROPHILS ABSOLUTE COUNT: 4.5 THOU/MM3 (ref 1.8–7.7)
SODIUM SERPL-SCNC: 141 MEQ/L (ref 135–145)
SP GR UR REFRACT.AUTO: 1.02 (ref 1–1.03)
UROBILINOGEN, URINE: 0.2 EU/DL (ref 0–1)
WBC # BLD AUTO: 8.7 THOU/MM3 (ref 4.8–10.8)
WBC #/AREA URNS HPF: ABNORMAL /HPF
WBC #/AREA URNS HPF: NEGATIVE /[HPF]
YEAST LIKE FUNGI URNS QL MICRO: ABNORMAL

## 2024-03-29 PROCEDURE — 1180000000 HC REHAB R&B

## 2024-03-29 PROCEDURE — 99232 SBSQ HOSP IP/OBS MODERATE 35: CPT | Performed by: PHYSICAL MEDICINE & REHABILITATION

## 2024-03-29 PROCEDURE — 97116 GAIT TRAINING THERAPY: CPT

## 2024-03-29 PROCEDURE — 97530 THERAPEUTIC ACTIVITIES: CPT

## 2024-03-29 PROCEDURE — 80048 BASIC METABOLIC PNL TOTAL CA: CPT

## 2024-03-29 PROCEDURE — 97110 THERAPEUTIC EXERCISES: CPT

## 2024-03-29 PROCEDURE — 81001 URINALYSIS AUTO W/SCOPE: CPT

## 2024-03-29 PROCEDURE — 6370000000 HC RX 637 (ALT 250 FOR IP): Performed by: PHYSICAL MEDICINE & REHABILITATION

## 2024-03-29 PROCEDURE — 90935 HEMODIALYSIS ONE EVALUATION: CPT

## 2024-03-29 PROCEDURE — 6360000002 HC RX W HCPCS: Performed by: PHYSICAL MEDICINE & REHABILITATION

## 2024-03-29 PROCEDURE — 90832 PSYTX W PT 30 MINUTES: CPT | Performed by: PSYCHOLOGIST

## 2024-03-29 PROCEDURE — 99232 SBSQ HOSP IP/OBS MODERATE 35: CPT | Performed by: INTERNAL MEDICINE

## 2024-03-29 PROCEDURE — 85025 COMPLETE CBC W/AUTO DIFF WBC: CPT

## 2024-03-29 PROCEDURE — 97535 SELF CARE MNGMENT TRAINING: CPT

## 2024-03-29 PROCEDURE — 97130 THER IVNTJ EA ADDL 15 MIN: CPT

## 2024-03-29 PROCEDURE — 97129 THER IVNTJ 1ST 15 MIN: CPT

## 2024-03-29 PROCEDURE — 84145 PROCALCITONIN (PCT): CPT

## 2024-03-29 PROCEDURE — 36415 COLL VENOUS BLD VENIPUNCTURE: CPT

## 2024-03-29 RX ORDER — PSEUDOEPHEDRINE HCL 30 MG
100 TABLET ORAL 3 TIMES DAILY
Qty: 90 CAPSULE | Refills: 3 | Status: SHIPPED | OUTPATIENT
Start: 2024-03-29

## 2024-03-29 RX ORDER — LANOLIN ALCOHOL/MO/W.PET/CERES
6 CREAM (GRAM) TOPICAL NIGHTLY
Qty: 60 TABLET | Refills: 3 | Status: SHIPPED | OUTPATIENT
Start: 2024-03-29

## 2024-03-29 RX ORDER — PANTOPRAZOLE SODIUM 40 MG/1
40 TABLET, DELAYED RELEASE ORAL
Qty: 30 TABLET | Refills: 3 | Status: SHIPPED | OUTPATIENT
Start: 2024-03-30

## 2024-03-29 RX ORDER — MEGESTROL ACETATE 40 MG/1
40 TABLET ORAL DAILY
Qty: 30 TABLET | Refills: 3 | Status: SHIPPED | OUTPATIENT
Start: 2024-03-30

## 2024-03-29 RX ORDER — BUPROPION HYDROCHLORIDE 300 MG/1
300 TABLET ORAL DAILY
Qty: 30 TABLET | Refills: 3 | Status: SHIPPED | OUTPATIENT
Start: 2024-03-30

## 2024-03-29 RX ORDER — HYDROCODONE BITARTRATE AND ACETAMINOPHEN 5; 325 MG/1; MG/1
.5-1 TABLET ORAL EVERY 6 HOURS PRN
Qty: 28 TABLET | Refills: 0 | Status: SHIPPED | OUTPATIENT
Start: 2024-03-29 | End: 2024-04-05

## 2024-03-29 RX ORDER — TRAZODONE HYDROCHLORIDE 100 MG/1
100 TABLET ORAL NIGHTLY
Qty: 30 TABLET | Refills: 3 | Status: SHIPPED | OUTPATIENT
Start: 2024-03-29

## 2024-03-29 RX ADMIN — HYDROCODONE BITARTRATE AND ACETAMINOPHEN 2 TABLET: 5; 325 TABLET ORAL at 08:30

## 2024-03-29 RX ADMIN — HEPARIN SODIUM 5000 UNITS: 5000 INJECTION INTRAVENOUS; SUBCUTANEOUS at 08:24

## 2024-03-29 RX ADMIN — DOCUSATE SODIUM 100 MG: 100 CAPSULE, LIQUID FILLED ORAL at 08:29

## 2024-03-29 RX ADMIN — Medication 6 MG: at 22:18

## 2024-03-29 RX ADMIN — HEPARIN SODIUM 5000 UNITS: 5000 INJECTION INTRAVENOUS; SUBCUTANEOUS at 22:19

## 2024-03-29 RX ADMIN — CLOPIDOGREL BISULFATE 75 MG: 75 TABLET ORAL at 08:29

## 2024-03-29 RX ADMIN — MEGESTROL ACETATE 40 MG: 40 TABLET ORAL at 08:24

## 2024-03-29 RX ADMIN — NALOXEGOL OXALATE 25 MG: 25 TABLET, FILM COATED ORAL at 06:23

## 2024-03-29 RX ADMIN — BUPROPION HYDROCHLORIDE 300 MG: 300 TABLET, FILM COATED, EXTENDED RELEASE ORAL at 08:24

## 2024-03-29 RX ADMIN — PANTOPRAZOLE SODIUM 40 MG: 40 TABLET, DELAYED RELEASE ORAL at 06:23

## 2024-03-29 RX ADMIN — SEVELAMER CARBONATE 800 MG: 800 TABLET, FILM COATED ORAL at 17:28

## 2024-03-29 RX ADMIN — MONTELUKAST SODIUM 10 MG: 10 TABLET ORAL at 08:24

## 2024-03-29 RX ADMIN — ATORVASTATIN CALCIUM 10 MG: 10 TABLET, FILM COATED ORAL at 08:24

## 2024-03-29 RX ADMIN — DOCUSATE SODIUM 100 MG: 100 CAPSULE, LIQUID FILLED ORAL at 15:46

## 2024-03-29 RX ADMIN — DOCUSATE SODIUM 100 MG: 100 CAPSULE, LIQUID FILLED ORAL at 22:18

## 2024-03-29 RX ADMIN — TRAZODONE HYDROCHLORIDE 100 MG: 100 TABLET ORAL at 22:18

## 2024-03-29 RX ADMIN — FOLIC ACID 500 MCG: 1 TABLET ORAL at 08:24

## 2024-03-29 RX ADMIN — SENNOSIDES 17.2 MG: 8.6 TABLET, FILM COATED ORAL at 22:18

## 2024-03-29 RX ADMIN — HYDROCODONE BITARTRATE AND ACETAMINOPHEN 1 TABLET: 5; 325 TABLET ORAL at 22:18

## 2024-03-29 RX ADMIN — HYDROCODONE BITARTRATE AND ACETAMINOPHEN 2 TABLET: 5; 325 TABLET ORAL at 17:49

## 2024-03-29 RX ADMIN — AMLODIPINE BESYLATE 5 MG: 5 TABLET ORAL at 08:24

## 2024-03-29 ASSESSMENT — PAIN DESCRIPTION - ORIENTATION
ORIENTATION: LEFT

## 2024-03-29 ASSESSMENT — PAIN DESCRIPTION - LOCATION
LOCATION: HIP;LEG
LOCATION: LEG
LOCATION: HIP

## 2024-03-29 ASSESSMENT — PAIN SCALES - GENERAL
PAINLEVEL_OUTOF10: 10
PAINLEVEL_OUTOF10: 8
PAINLEVEL_OUTOF10: 5
PAINLEVEL_OUTOF10: 10

## 2024-03-29 ASSESSMENT — PAIN DESCRIPTION - DESCRIPTORS
DESCRIPTORS: ACHING

## 2024-03-29 ASSESSMENT — PAIN - FUNCTIONAL ASSESSMENT
PAIN_FUNCTIONAL_ASSESSMENT: ACTIVITIES ARE NOT PREVENTED

## 2024-03-29 NOTE — PROGRESS NOTES
[facility]  INPATIENT PHYSICAL THERAPY  DAILY NOTE  [facility] - 8K-17/017-A    Time In: 0900  Time Out: 1030  Timed Code Treatment Minutes: 90 Minutes  Minutes: 90          Date: 3/29/2024  Patient Name: Deloris Watts,  Gender:  female        MRN: [id number]  : 1955  (68 y.o.)  Referral Date : 24  Referring Practitioner: Jax Whatley MD  Diagnosis: Closed fracture of neck of left femur, initial encounter  Additional Pertinent Hx: Per H&P:Deloris Watts  is a 68 y.o. right-handed  female with history of hypertension, hyperlipidemia, hydronephrosis, fibromyalgia, irritable bowel syndrome, osteoporosis, end-stage renal disease on hemodialysis (MWF) since 2023, stroke with left side weakness in , anemia, depression, anxiety, right wrist and right ankle fracture treated conservatively, right proximal humeral fracture due to fall requiring ORIF, status post right carpal tunnel release surgery, status post cervical spine surgery, status post hysterectomy and bilateral oophorectomy, hemorrhoidectomy, sinus surgery, tubal ligation, L2 compression fracture requiring kyphoplasty, LASEK surgery, is admitted to the inpatient rehabilitation unit on 3/14/2024 for intensive inpatient rehabilitation treatment impairment and disability secondary to displaced left femur neck fracture as result of fall on 3/10/2024 requiring left hip hemiarthroplasty surgery.The patient says while she was pulling up her pant after using toilet in her home's bathroom on 3/10/2024, she suddenly lost balance and fell to the ground hitting her left hip.  She thinks she also hit her head to something at the time.  She did not loss consciousness.  She immediately feels severe pain at her left hip and was unable to get up even with her 's assistance.  Therefore 911 was called and the patient was sent to [facility] ER by EMS.  Multiple x-ray and CAT  MET    Following session, patient left in safe position with all fall risk precautions in place.

## 2024-03-29 NOTE — PROGRESS NOTES
Discharge pain assessment:    Complete within 3 days of discharge.      Pain Effect on Sleep ()   Ask patient: \"Over the past 5 days, how much of the time has pain made it hard for you to sleep at night?\" 2.  Occasionally     If no pain is reported, end interview.  If pain is reported, continue with the additional questions.   Pain Interference with Therapy Activities   Ask patient: \"Over the past 5 days, how often have you limited your participation in rehabilitation therapy sessions due to pain?\" 1.  Rarely or not at all   Pain Interference with Day to Day Activities   Ask patient: \"Over the past 5 days, how often have you limited your day to day activities (excluding rehabilitation therapy sessions) because of pain?\" 1.  Rarely or not at all

## 2024-03-29 NOTE — PROGRESS NOTES
Patient: Deloris Watts  Unit/Bed: 8K-17/017-A  YOB: 1955  MRN: [id number] Acct: [id number]   Admitting Diagnosis: Closed fracture of neck of left femur, initial encounter ([facility]) [S72.002A]  Admit Date:  3/14/2024  Hospital Day: 15    Assessment:     Principal Problem:    Closed fracture of neck of left femur, initial encounter ([facility])  Active Problems:    Depression    Anxiety disorder    History of CVA (cerebrovascular accident)    Hyperlipidemia    Allergic rhinitis    Essential hypertension    Generalized anxiety disorder    ESRD on hemodialysis ([facility])    Chronic constipation  Resolved Problems:    * No resolved hospital problems. *      Plan:     Continue to follow.   I discussed the recent labs with Dr Whatley.        Subjective:     Patient has no complaint of CP or SOB..   Medication side effects: none    Scheduled Meds:   docusate sodium  100 mg Oral TID    bisacodyl  10 mg Rectal Daily    naloxegol  25 mg Oral QAM AC    linaclotide  290 mcg Oral QAM AC    megestrol  40 mg Oral Daily    pantoprazole  40 mg Oral QAM AC    traZODone  100 mg Oral Nightly    amLODIPine  5 mg Oral Daily    atorvastatin  10 mg Oral Daily    buPROPion  300 mg Oral Daily    clopidogrel  75 mg Oral Daily    folic acid  500 mcg Oral Daily    heparin (porcine)  5,000 Units SubCUTAneous BID    montelukast  10 mg Oral Daily    senna  2 tablet Oral Nightly    sevelamer  800 mg Oral Lunch    melatonin  6 mg Oral Nightly     Continuous Infusions:   sodium chloride      dextrose       PRN Meds:meclizine, sodium chloride, dextrose, dextrose bolus **OR** dextrose bolus, docusate sodium, glucagon (rDNA), glucose, HYDROcodone 5 mg - acetaminophen **OR** HYDROcodone 5 mg - acetaminophen, magnesium hydroxide, ondansetron, polyethylene glycol, sodium chloride flush, acetaminophen, diclofenac sodium    Review of Systems  Pertinent items are noted in HPI.    Objective:     Patient Vitals for the past 8 hrs:   BP Temp Pulse Resp Weight

## 2024-03-29 NOTE — PLAN OF CARE
Problem: Discharge Planning  Goal: Discharge to home or other facility with appropriate resources  3/29/2024 1234 by Reta Mejia, RN  Outcome: Progressing  Flowsheets (Taken 3/28/2024 2241 by Josephine Ribeiro, RN)  Discharge to home or other facility with appropriate resources: Identify barriers to discharge with patient and caregiver  Note: Patient preparing for discharge to home tomorrow.      Problem: Pain  Goal: Verbalizes/displays adequate comfort level or baseline comfort level  3/29/2024 1234 by Reta Mejia, RN  Outcome: Progressing  Flowsheets (Taken 3/29/2024 1233)  Verbalizes/displays adequate comfort level or baseline comfort level:   Encourage patient to monitor pain and request assistance   Assess pain using appropriate pain scale   Implement non-pharmacological measures as appropriate and evaluate response   Administer analgesics based on type and severity of pain and evaluate response  Note: Taking Norco for pain meds.      Problem: Skin/Tissue Integrity  Goal: Absence of new skin breakdown  Description: 1.  Monitor for areas of redness and/or skin breakdown  2.  Assess vascular access sites hourly  3.  Every 4-6 hours minimum:  Change oxygen saturation probe site  4.  Every 4-6 hours:  If on nasal continuous positive airway pressure, respiratory therapy assess nares and determine need for appliance change or resting period.  3/29/2024 1234 by Reta Mejia, RN  Outcome: Progressing  Note: No skin breakdown

## 2024-03-29 NOTE — PROGRESS NOTES
Patient: Deloris Watts  Unit/Bed: 8K-17/017-A  YOB: 1955  MRN: [id number] Acct: [id number]   Admitting Diagnosis: Closed fracture of neck of left femur, initial encounter ([facility]) [S72.002A]  Admit Date:  3/14/2024  Hospital Day: 14    Assessment:     Principal Problem:    Closed fracture of neck of left femur, initial encounter ([facility])  Active Problems:    Depression    Anxiety disorder    History of CVA (cerebrovascular accident)    Hyperlipidemia    Allergic rhinitis    Essential hypertension    Generalized anxiety disorder    ESRD on hemodialysis (Former[facility])    Chronic constipation  Resolved Problems:    * No resolved hospital problems. *      Plan:     The staff messaged me with concern of her elevation in WBC and her confusion. A procalcitonin and a lactic acid were checked and were normal.        Subjective:     Patient has no complaint of CP or SOB and worsened left thigh pain  Medication side effects: none    Scheduled Meds:   docusate sodium  100 mg Oral TID    bisacodyl  10 mg Rectal Daily    naloxegol  25 mg Oral QAM AC    linaclotide  290 mcg Oral QAM AC    megestrol  40 mg Oral Daily    pantoprazole  40 mg Oral QAM AC    traZODone  100 mg Oral Nightly    amLODIPine  5 mg Oral Daily    atorvastatin  10 mg Oral Daily    buPROPion  300 mg Oral Daily    clopidogrel  75 mg Oral Daily    folic acid  500 mcg Oral Daily    heparin (porcine)  5,000 Units SubCUTAneous BID    montelukast  10 mg Oral Daily    senna  2 tablet Oral Nightly    sevelamer  800 mg Oral Lunch    melatonin  6 mg Oral Nightly     Continuous Infusions:   sodium chloride      dextrose       PRN Meds:meclizine, sodium chloride, dextrose, dextrose bolus **OR** dextrose bolus, docusate sodium, glucagon (rDNA), glucose, HYDROcodone 5 mg - acetaminophen **OR** HYDROcodone 5 mg - acetaminophen, magnesium hydroxide, ondansetron, polyethylene glycol, sodium chloride flush, acetaminophen, diclofenac sodium    Review of Systems  Pertinent items

## 2024-03-29 NOTE — PLAN OF CARE
Problem: Discharge Planning  Goal: Discharge to home or other facility with appropriate resources  3/28/2024 2241 by Josephine Ribeiro, RN  Outcome: Progressing  Flowsheets (Taken 3/28/2024 2241)  Discharge to home or other facility with appropriate resources: Identify barriers to discharge with patient and caregiver     Problem: ABCDS Injury Assessment  Goal: Absence of physical injury  Outcome: Progressing  Flowsheets (Taken 3/28/2024 2241)  Absence of Physical Injury: Implement safety measures based on patient assessment  Note: Patient has remained free of physical injury during this shift. Safe environment provided, call light within reach, and hourly rounding completed.      Problem: Safety - Adult  Goal: Free from fall injury  3/28/2024 2241 by Josephine Ribeiro RN  Outcome: Progressing  Flowsheets (Taken 3/28/2024 2241)  Free From Fall Injury: Instruct family/caregiver on patient safety  Note: Patient has remained free of physical injury during this shift. Safe environment provided, call light within reach, and hourly rounding completed.    Care plan reviewed with patient.  Patient verbalize understanding of the plan of care and contribute to goal setting.

## 2024-03-29 NOTE — PROGRESS NOTES
Deloris Watts 3/29/2024     Subjective: Spoke briefly with Deloris, who is well known to me from my outpatient role. She reports she is feeling hopeful about her progress, and accepting that she needs to go to [facility], where she has done rehab before. Indicated to me that she would schedule an outpatient follow up appointment with me after her return home    Trying to eat more, but says she feels full right away. We talked about how to gradually build up her tolerance for more food. She recognizes how important it is. She recalled independently about being put on the Megace by Dr. Whatley    Objective: Affect mood congruent, mostly a bit flat. Very thin, frail    Assessment/Impressions: Weak, declining, but cognitive better. Non depressed. Anxious about transitions, but within the normal range. Pain is a limiting factor.    Plan: I will f/u outpatient    Patient Active Problem List   Diagnosis    Cerebral infarction (HCC)    Hx of Chest pain, atypical- tenderness on the left lower chest wall    Anxiety disorder    History of CVA (cerebrovascular accident)    Hyperlipidemia    Irritable bowel syndrome    Abdominal adhesions    Allergic rhinitis    Essential hypertension    Generalized anxiety disorder    Stage 4 chronic kidney disease (HCC)    Age related osteoporosis    Major depression, recurrent (HCC)    Environmental and seasonal allergies    Hearing loss of right ear due to cerumen impaction    Recurrent sinusitis    Abnormal EKG    Postural dizziness    SOB (shortness of breath) on exertion    Lumbosacral radiculopathy    Degeneration of lumbar intervertebral disc    Acute renal failure (HCC)    Metabolic acidosis    Hypokalemia    Hypomagnesemia    Hyperphosphatemia    Hypocalcemia    Hemoptysis    ERUM (acute kidney injury) (HCC)    Nosebleed    Acute on chronic anemia    Hypernatremia    Abdominal pain    Nausea and vomiting    Upper respiratory tract infection    Chronic diastolic (congestive) heart failure  (HCC)    Depression    Generalized weakness    ATN (acute tubular necrosis) (HCC)    Hypercalcemia    ARF (acute renal failure) with tubular necrosis (HCC)    Moderate malnutrition (HCC)    ESRD on hemodialysis (HCC)    Hyperkalemia    Physical deconditioning    Hydronephrosis of left kidney    Ureterolithiasis    Urinary tract infection without hematuria    Anemia, chronic disease    Chronic constipation    Trauma    Secondary hyperparathyroidism of renal origin (HCC)    Liver injury, laceration    Closed compression fracture of L2 lumbar vertebra, initial encounter (HCC)    Traumatic compression fracture of L2 lumbar vertebra, closed, initial encounter ([facility])    Moderate episode of recurrent major depressive disorder (HCC)    Closed fracture of neck of left femur, initial encounter (HCC)    Closed displaced fracture of left femoral neck (HCC)    Closed subcapital fracture of femur, left, initial encounter ([facility])            Time spent with patient: 20 minutes    Diagnosis for this encounter: GABBY      Electronically signed by Reina Mendoza, PhD on 3/29/2024 at 1:41 PM

## 2024-03-29 NOTE — PROGRESS NOTES
patient was found to have acute displaced left femur neck fracture.  She then was admitted.  Orthopedic was contacted to evaluate the hip fracture.  Nephrology service was consulted for continuing her 3 times daily hemodialysis.  Surgical intervention for the left femur neck displaced fracture was recommended.  The patient then underwent left hip hemiarthroplasty on 3/11/2024 by Dr. Virgilio Lezama for the left displaced femoral neck fracture.  She was allowed to have weightbearing as tolerated on left lower extremity postoperatively with posterior hip precaution.The patient was previously in inpatient rehab service from 12/21/2023 to 1/5/2024 for intensive inpatient management of impairment & disability secondary to fall accident on 12/14/2023 resulting L2 compression fracture requiring kyphoplasty, and liver laceration.  She was discharged to home on 1/5/2024 with referral for outpatient PT, OT and speech therapy.    Restrictions/Precautions:  Restrictions/Precautions: Weight Bearing, Fall Risk  Left Lower Extremity Weight Bearing: Weight Bearing As Tolerated  Position Activity Restriction  Hip Precautions: No hip flexion > 90 degrees, No ADduction, No hip internal rotation, Posterior hip precautions  Other position/activity restrictions: M-W-F Dialysis     Social/Functional History:  Lives With: Spouse  Type of Home: House  Home Layout: One level  Home Access: Stairs to enter without rails  Entrance Stairs - Number of Steps: 1 MARIANA with left sided grab bar on the wall  Home Equipment: Walker, rolling, Walker, standard, Wheelchair-manual, Lift chair (transport chair)   Bathroom Shower/Tub: Tub/Shower unit  Bathroom Equipment: Shower chair, Grab bars in shower  Bathroom Accessibility: Not accessible       ADL Assistance: Independent  Homemaking Assistance: Needs assistance (spouse completes cooking, cleaning, laundry and grocery shopping)  Homemaking Responsibilities: No  Ambulation Assistance:  Independent  Transfer Assistance: Independent    Active : No  Patient's  Info: Pt can drive, but  does all the driving     Additional Comments: Patient reports she walks with a rolling walker PTA. Patient was on IPR in Dec 2023 for 2 weeks after falling and breaking L2 vertebra.  very supportive and able to assist at discharge.  takes patient to dialysis M-W-F.    SUBJECTIVE: Pt was lying supine with HOB elevated finishing breakfast upon arrival. Pt pleasant and agreeable to OT session.    PAIN: 7/10: in L hip and c/o 9/10 at EOS    Vitals: Vitals not assessed per clinical judgement, see nursing flowsheet    COGNITION: Slow Processing and Decreased Recall    ADL:   EATING:Setup or clean-up assistance.   .  CARE Score: 5.     ORAL HYGIENE:Supervision or touching assistance.  Pt unable to complete standing. Task modified seated EOB with supervison..  CARE Score: 4.     TOILETING HYGIENE:Supervision or touching assistance.   .  CARE Score: 4.     SHOWERING/BATHING:Partial/moderate assistance.  Pt completes sponge bath seated on EOB with min A to wash below B knees..  CARE Score: 3.     UPPER BODY DRESSING:Supervision or touching assistance.  Pt requires Supervision to doff/don shirt seated on EOB..  CARE Score: 4.     LOWER BODY DRESSING:Partial/moderate assistance.  Pt  utilizes reacher to thread BLE into depends and attempts pants however, requires min A to thread BLE completely. Pt able to complete hip mgmt standing with RW and SBA..  CARE Score: 3.     FOOTWEAR:Partial/moderate assistance  Pt attempts to utilize reacher to doff B socks however requires A to doff. Pt then utilizes sock aid to don B slipper socks with mod vc for technique..  CARE Score: 3.     TOILET TRANSFER: Supervision or touching assistance.  SBA with min vc for handplacement. CARE Score: 4.        IADL:   Not Tested    BALANCE:  Sitting Balance:  Supervision. Seated on EOB with BUE release. Pt demo'ed x1 event

## 2024-03-29 NOTE — PROGRESS NOTES
Signed         Patient had dialysis today and 900ml removed. Elevated WBC and platelets decreasing. Incontinent of urine.  agrees to learn how to administer Heparin. No new drainage on dressing. Assisted up to chair this afternoon.

## 2024-03-29 NOTE — PLAN OF CARE
Problem: Discharge Planning  Goal: Discharge to home or other facility with appropriate resources  3/29/2024 1012 by Kusum Guajardo LISW  Note: Patient to be discharged on Saturday, 3/30 to home. Patient will be under the supervision of her spouse, Tenzin. Family education was provided throughout patient's stay. Patient will be receiving services through [facility]. VARSHA provided information to [facility] on 3/26 via fax. VARSHA spoke with Kaela on this date. She reported that they will be call Tenzin to schedule on Monday, 4/1. No outstanding needs.

## 2024-03-29 NOTE — PROGRESS NOTES
[facility]   Nephrology progress note  3/29/2024, 11:30 AM      Pt Name:    Deloris Watts  MRN:     [id number]     YOB: 1955  Admit Date:    3/14/2024  5:39 PM    Chief Complaint: Nephrology following for ESRD on hemodialysis.    Subjective:  Patient seen and examined  No chest pain or shortness of breath  No other complaints.  Finished rehab today getting started on dialysis    Objective:  24HR INTAKE/OUTPUT:    Intake/Output Summary (Last 24 hours) at 3/29/2024 1130  Last data filed at 3/29/2024 0923  Gross per 24 hour   Intake 920 ml   Output --   Net 920 ml        Admission weight: 46.7 kg (103 lb)  Wt Readings from Last 3 Encounters:   03/28/24 39.6 kg (87 lb 4.8 oz)   03/13/24 42.7 kg (94 lb 2.2 oz)   02/22/24 47 kg (103 lb 9.6 oz)        Vitals :   Vitals:    03/28/24 1001 03/28/24 1106 03/28/24 2005 03/29/24 0815   BP:   (!) 113/53 (!) 116/54   Pulse:   89 92   Resp: 16  16 18   Temp:  98.5 °F (36.9 °C) 98.6 °F (37 °C) 98.4 °F (36.9 °C)   TempSrc:  Oral Oral Oral   SpO2:   100% 97%   Weight:       Height:           Physical examination  General Appearance:  Well developed. No distress  Mouth/Throat:  Oral mucosa moist  Neck:  Supple, no JVD  Lungs:  Breath sounds: clear  Heart::  S1,S2 heard  Abdomen:  Soft, non - tender  Musculoskeletal:  Edema - none    Medications:  Infusion:    sodium chloride      dextrose       Meds:    docusate sodium  100 mg Oral TID    bisacodyl  10 mg Rectal Daily    naloxegol  25 mg Oral QAM AC    linaclotide  290 mcg Oral QAM AC    megestrol  40 mg Oral Daily    pantoprazole  40 mg Oral QAM AC    traZODone  100 mg Oral Nightly    amLODIPine  5 mg Oral Daily    atorvastatin  10 mg Oral Daily    buPROPion  300 mg Oral Daily    clopidogrel  75 mg Oral Daily    folic acid  500 mcg Oral Daily    heparin (porcine)  5,000 Units SubCUTAneous BID    montelukast  10 mg Oral Daily    senna  2 tablet Oral Nightly    sevelamer  800 mg Oral Lunch     melatonin  6 mg Oral Nightly       Lab Data :  CBC:   Recent Labs     03/27/24  0617 03/29/24  0410   WBC 12.3* 8.7   HGB 10.5* 9.2*   HCT 32.9* 29.2*   * 456*       CMP:  Recent Labs     03/27/24  0617 03/29/24  0410    141   K 4.0 4.2   CL 95* 98   CO2 22* 24   BUN 42* 47*   CREATININE 6.3* 6.4*   GLUCOSE 84 84   CALCIUM 9.4 9.1   MG 2.7*  --        Hepatic: No results for input(s): \"LABALBU\", \"AST\", \"ALT\", \"ALB\", \"BILITOT\", \"ALKPHOS\" in the last 72 hours.    Assessment and Plan:  Renal -ESRD on hemodialysis Monday Wednesday Friday  Volume status and electrolytes reasonably stable  Will get her dialyzed today  Electrolytes -within normal limits  Mild acidosis stable  Essential hypertension running well  Fracture femur status post surgery 3/11/2024 undergoing rehab  Meds reviewed and discussed with patient   Mostly for home tomorrow    To Cuadra MD  [facility]    This report has been created using voice recognition software. It may contain minor errors which are inherent in voice recognition technology

## 2024-03-29 NOTE — PROGRESS NOTES
cues, 4/16 mod cues, 3/16 total assist  *Fair carryover of medication regimen.      Problem Solving ([place] Menu): 6/12 independent, 1/12 min cue, 2/12 mod cues, 1/12 max cue, 2/12 total assist  *Good visual scanning of the menu.  *Decreased mental math calculations and working memory.      Medication Label Interpretation: 7/10 independent, 1/10 min cue, 1/10 mod cue, 1/10 max cue  *Good visual scanning of medication label.  *Decreased reasoning of more open ended questions (I.e. \"Should Debbie take her medication with coffee or juice? Why or why not?\")     *Recommend continued assistance with medication management after discharge; patient reports  provides assistance at baseline.    *Suspect patient may have difficulty navigating unfamiliar environments; recommend assistance.    Good general sustained attention throughout tasks; increased participation from previous sessions.        Review of Systems:  Review of Systems   Constitutional:  Positive for fatigue. Negative for appetite change, chills, diaphoresis and fever.   HENT:  Negative for hearing loss, rhinorrhea, sneezing, sore throat and trouble swallowing.    Eyes:  Negative for visual disturbance.   Respiratory:  Negative for cough, shortness of breath and wheezing.    Cardiovascular:  Negative for chest pain and palpitations.   Gastrointestinal:  Negative for abdominal pain, constipation, diarrhea, nausea and vomiting.   Genitourinary:  Positive for decreased urine volume. Negative for dysuria.   Musculoskeletal:  Positive for arthralgias (Left hip) and gait problem. Negative for back pain and neck pain.   Skin:  Negative for rash.   Neurological:  Positive for weakness (Left lower extremity). Negative for dizziness, tremors, speech difficulty, light-headedness, numbness and headaches.   Psychiatric/Behavioral:  Negative for dysphoric mood, hallucinations and sleep disturbance. The patient is not nervous/anxious.         Objective:  BP (!) 116/54    kyphoplasty  Hypertension  Irritable bowel syndrome  Hyperlipidemia  Osteoporosis  Allergic rhinitis  Vomiting episode possibly due to GERD  History of hydronephrosis  History of fibromyalgia  History of depression and anxiety  History of right proximal humerus fracture requiring ORIF  History of right wrist fracture requiring casting  History of right ankle fracture requiring casting  Cognitive impairment     The patient is WBC and platelet count unexpectedly increased yesterday with WBC up to 12.5 and platelet count up to 640.  CBC today showed the WBC back to normal at 8.7 but still has elevated platelet count at 456 which is less than yesterday.  The patient still has elevated procalcitonin to 0.56.  She is not offering any respiratory, GI or urinary complaints.  Urinalysis reflex to culture was ordered.  The patient still has painful symptom at left hip which is no worse than before.  The adhesive dressing is still pleasant without erythema at the surrounding area.  We will continue to monitor the patient for any signs of infection.  She is scheduled to have hemodialysis today.  So far she is tolerating the intensive rehab treatment well.  Her function is improving.    The patient currently is scheduled to be discharged tomorrow, 3/30/2024.      Plan:  Continue intensive PT/OT/SLP/RT inpatient rehabilitation program at least 3 hours per day, 5 days per week in order to improve functional status prior to discharge.  Family education and training will be completed.  Equipment evaluations and recommendations will be completed as appropriate.       Rehabilitation nursing continue to be involved for bowel, bladder, skin, and pain management.  Nursing will also provide education and training to patient and family.    Prophylaxis:  DVT: Subcutaneous heparin, ACE stocking, intermittent pneumatic compression device.  GI: Colace, Senokot, Enemeez enema, Movantik, Dulcolax suppository as needed, milk of magnesium as

## 2024-03-29 NOTE — FLOWSHEET NOTE
03/29/24 1415   Vital Signs   /78   Temp 97.1 °F (36.2 °C)   Pulse 84   Respirations 16   Weight - Scale 38.6 kg (85 lb 1.6 oz)   Weight Method Bed scale   Percent Weight Change -2.28   Post-Hemodialysis Assessment   Post-Treatment Procedures Blood returned;Catheter Capped, clamped with Saline x2 ports   Machine Disinfection Process Acid/Vinegar Clean;Heat Disinfect;Exterior Machine Disinfection   Rinseback Volume (ml) 400 ml   Blood Volume Processed (Liters) 58 L   Dialyzer Clearance Lightly streaked   Duration of Treatment (minutes) 150 minutes   Heparin Amount Administered During Treatment (mL) 0 mL   Hemodialysis Intake (ml) 300 ml   Hemodialysis Output (ml) 1200 ml   NET Removed (ml) 900     Stable 2.5 hour TX completed. Removed 900 ml of fluid. Bilateral cath ports flushed with normal saline, clamped, and secured with tegos. CVC drsg clean, dry, and intact. Report called to primary RN. TX record printed for scanning into EMR.

## 2024-03-29 NOTE — PROGRESS NOTES
[facility]  INPATIENT SPEECH THERAPY  [facility]  DISCHARGE NOTE    TIME   SLP Individual Minutes  Time In: 0830  Time Out: 0900  Minutes: 30  Timed Code Treatment Minutes: 30 Minutes     Date: 3/29/2024  Patient Name: Deloris Watts      CSN: [id number]   : 1955  (68 y.o.)  Gender: female   Referring Physician:  Dr. Jax Whatley  Diagnosis: Closed Fracture of Neck of Left Femur   Precautions: fall risk  Current Diet: Regular solids. Thin liquids.  Respiratory Status: Room Air  Swallowing Strategies: Standard Universal Swallow Precautions  Date of Last MBS/FEES: Not Applicable    Pain:  8/10 - Pain location: L Leg - RN aware and provided pain medication    Subjective: Patient awake in bed with RN at bedside completing med pass. No family present at beginning of session.  arrived towards end of session. Pleasant and cooperative throughout.     FAMILY EDUCATION RELATED TO COGNITIVE FUNCTIONING:   *Reviewed rationale for speech therapy services discussing patient's specific cognitive impairments and how they relate to performance during specific contextualized treatment, progress towards goals, and areas for continued difficulty.  *Discussed the following areas reviewing progress, suggestions for home and further recommendations  -Memory-   Provided education on compensatory memory strategies (write it, repeat it, associate it, picture it), and specific examples related to implementing strategies at home (ie: use of calendar for events/appointments, notepad for making lists and keeping notes). Encouraged patient to stick with one method of organization (ie: writing information on a single calendar) to aid in organization (patient reporting that she sometimes will also input information into her phone).   -Safety-   -Discussed keeping all important numbers in phone for easy access.   -Discussed consideration for implementation of a Life Alert

## 2024-03-30 VITALS
HEART RATE: 92 BPM | RESPIRATION RATE: 16 BRPM | TEMPERATURE: 98.1 F | OXYGEN SATURATION: 97 % | BODY MASS INDEX: 16.88 KG/M2 | HEIGHT: 60 IN | WEIGHT: 85.98 LBS | DIASTOLIC BLOOD PRESSURE: 55 MMHG | SYSTOLIC BLOOD PRESSURE: 124 MMHG

## 2024-03-30 LAB
ALBUMIN SERPL BCG-MCNC: 3.6 G/DL (ref 3.5–5.1)
ALP SERPL-CCNC: 153 U/L (ref 38–126)
ALT SERPL W/O P-5'-P-CCNC: 13 U/L (ref 11–66)
ANION GAP SERPL CALC-SCNC: 17 MEQ/L (ref 8–16)
AST SERPL-CCNC: 15 U/L (ref 5–40)
BASOPHILS ABSOLUTE: 0.1 THOU/MM3 (ref 0–0.1)
BASOPHILS NFR BLD AUTO: 1.3 %
BILIRUB SERPL-MCNC: 0.2 MG/DL (ref 0.3–1.2)
BUN SERPL-MCNC: 25 MG/DL (ref 7–22)
CALCIUM SERPL-MCNC: 9.3 MG/DL (ref 8.5–10.5)
CHLORIDE SERPL-SCNC: 95 MEQ/L (ref 98–111)
CO2 SERPL-SCNC: 23 MEQ/L (ref 23–33)
CREAT SERPL-MCNC: 4.1 MG/DL (ref 0.4–1.2)
DEPRECATED RDW RBC AUTO: 52.6 FL (ref 35–45)
EOSINOPHIL NFR BLD AUTO: 4.6 %
EOSINOPHILS ABSOLUTE: 0.4 THOU/MM3 (ref 0–0.4)
ERYTHROCYTE [DISTWIDTH] IN BLOOD BY AUTOMATED COUNT: 13.9 % (ref 11.5–14.5)
GFR SERPL CREATININE-BSD FRML MDRD: 11 ML/MIN/1.73M2
GLUCOSE SERPL-MCNC: 80 MG/DL (ref 70–108)
HCT VFR BLD AUTO: 31.5 % (ref 37–47)
HGB BLD-MCNC: 9.9 GM/DL (ref 12–16)
IMM GRANULOCYTES # BLD AUTO: 0.08 THOU/MM3 (ref 0–0.07)
IMM GRANULOCYTES NFR BLD AUTO: 1 %
LYMPHOCYTES ABSOLUTE: 2.7 THOU/MM3 (ref 1–4.8)
LYMPHOCYTES NFR BLD AUTO: 34.9 %
MCH RBC QN AUTO: 32.2 PG (ref 26–33)
MCHC RBC AUTO-ENTMCNC: 31.4 GM/DL (ref 32.2–35.5)
MCV RBC AUTO: 102.6 FL (ref 81–99)
MONOCYTES ABSOLUTE: 0.8 THOU/MM3 (ref 0.4–1.3)
MONOCYTES NFR BLD AUTO: 9.9 %
NEUTROPHILS NFR BLD AUTO: 48.3 %
NRBC BLD AUTO-RTO: 0 /100 WBC
PLATELET # BLD AUTO: 417 THOU/MM3 (ref 130–400)
PMV BLD AUTO: 9.5 FL (ref 9.4–12.4)
POTASSIUM SERPL-SCNC: 3.7 MEQ/L (ref 3.5–5.2)
PROCALCITONIN SERPL IA-MCNC: 0.55 NG/ML (ref 0.01–0.09)
PROT SERPL-MCNC: 6.7 G/DL (ref 6.1–8)
RBC # BLD AUTO: 3.07 MILL/MM3 (ref 4.2–5.4)
SEGMENTED NEUTROPHILS ABSOLUTE COUNT: 3.8 THOU/MM3 (ref 1.8–7.7)
SODIUM SERPL-SCNC: 135 MEQ/L (ref 135–145)
WBC # BLD AUTO: 7.8 THOU/MM3 (ref 4.8–10.8)

## 2024-03-30 PROCEDURE — 97110 THERAPEUTIC EXERCISES: CPT

## 2024-03-30 PROCEDURE — 6360000002 HC RX W HCPCS: Performed by: PHYSICAL MEDICINE & REHABILITATION

## 2024-03-30 PROCEDURE — 85025 COMPLETE CBC W/AUTO DIFF WBC: CPT

## 2024-03-30 PROCEDURE — 6370000000 HC RX 637 (ALT 250 FOR IP): Performed by: PHYSICAL MEDICINE & REHABILITATION

## 2024-03-30 PROCEDURE — 97116 GAIT TRAINING THERAPY: CPT

## 2024-03-30 PROCEDURE — 99239 HOSP IP/OBS DSCHRG MGMT >30: CPT | Performed by: PHYSICAL MEDICINE & REHABILITATION

## 2024-03-30 PROCEDURE — 80053 COMPREHEN METABOLIC PANEL: CPT

## 2024-03-30 PROCEDURE — 84145 PROCALCITONIN (PCT): CPT

## 2024-03-30 PROCEDURE — 97530 THERAPEUTIC ACTIVITIES: CPT

## 2024-03-30 PROCEDURE — 99232 SBSQ HOSP IP/OBS MODERATE 35: CPT | Performed by: INTERNAL MEDICINE

## 2024-03-30 PROCEDURE — 36415 COLL VENOUS BLD VENIPUNCTURE: CPT

## 2024-03-30 RX ADMIN — DOCUSATE SODIUM 100 MG: 100 CAPSULE, LIQUID FILLED ORAL at 08:38

## 2024-03-30 RX ADMIN — MEGESTROL ACETATE 40 MG: 40 TABLET ORAL at 08:38

## 2024-03-30 RX ADMIN — PANTOPRAZOLE SODIUM 40 MG: 40 TABLET, DELAYED RELEASE ORAL at 06:07

## 2024-03-30 RX ADMIN — ATORVASTATIN CALCIUM 10 MG: 10 TABLET, FILM COATED ORAL at 08:40

## 2024-03-30 RX ADMIN — HEPARIN SODIUM 5000 UNITS: 5000 INJECTION INTRAVENOUS; SUBCUTANEOUS at 08:41

## 2024-03-30 RX ADMIN — CLOPIDOGREL BISULFATE 75 MG: 75 TABLET ORAL at 08:38

## 2024-03-30 RX ADMIN — BUPROPION HYDROCHLORIDE 300 MG: 300 TABLET, FILM COATED, EXTENDED RELEASE ORAL at 08:40

## 2024-03-30 RX ADMIN — MONTELUKAST SODIUM 10 MG: 10 TABLET ORAL at 08:38

## 2024-03-30 RX ADMIN — HYDROCODONE BITARTRATE AND ACETAMINOPHEN 1 TABLET: 5; 325 TABLET ORAL at 08:39

## 2024-03-30 RX ADMIN — FOLIC ACID 500 MCG: 1 TABLET ORAL at 08:38

## 2024-03-30 RX ADMIN — AMLODIPINE BESYLATE 5 MG: 5 TABLET ORAL at 08:40

## 2024-03-30 RX ADMIN — NALOXEGOL OXALATE 25 MG: 25 TABLET, FILM COATED ORAL at 06:07

## 2024-03-30 NOTE — PLAN OF CARE
Problem: Discharge Planning  Goal: Discharge to home or other facility with appropriate resources  3/30/2024 0003 by Karime Browning LPN  Outcome: Progressing  Flowsheets (Taken 3/30/2024 0003)  Discharge to home or other facility with appropriate resources: Identify barriers to discharge with patient and caregiver  Note: Patient continues to progress with therapy. Social service working on discharge needs. Patient plans to discharge 3/30/2024.    Problem: Skin/Tissue Integrity  Goal: Absence of new skin breakdown  Description: 1.  Monitor for areas of redness and/or skin breakdown  2.  Assess vascular access sites hourly  3.  Every 4-6 hours minimum:  Change oxygen saturation probe site  4.  Every 4-6 hours:  If on nasal continuous positive airway pressure, respiratory therapy assess nares and determine need for appliance change or resting period.  3/30/2024 0003 by Karime Browning LPN  Outcome: Progressing  Note: No new skin issues noted this shift.    Problem: Safety - Adult  Goal: Free from fall injury  3/30/2024 0003 by Karime Browning LPN  Outcome: Progressing  Flowsheets (Taken 3/30/2024 0003)  Free From Fall Injury: Instruct family/caregiver on patient safety  Note: No falls sustained at this time. Patient alert to call light and uses appropriately to alert staff of needs. Bed/chair alarm in use.

## 2024-03-30 NOTE — PROGRESS NOTES
treatment: Good treatment tolerance      Assessment: Assessment: Deloris has made good progress toward goals.  Pt requires moderate assist for bathing, Supervision for UE dressing, Min A for LE dressing and Max cues for encouragement. Pt does have assistance from  with IADLs but required SBA for simple IADL task. Pt has progressed to ambulating to/from the bathroom with SBA using RW and demonstrating 3/3 hip precautions during ADL routine with min vcs. Pt continues to demonstrate deficits with ADLs, IADLs, and functional mobility requiring assist to complete these tasks. Pt will continue to benefit from OT services to increase independence with these tasks to facilitate return to PLOF.  Discharge Recommendations: Outpatient OT, Patient would benefit from continued therapy after discharge  Equipment Recommendations: Other: Recomned tub transfer bench- spouse looking to see if they have one. Also discussed elevated toilet seat.  Plan: HOme witih  and OPOT    Education:  Learners: Patient and Family  Home Exercise Program Hip kit options    Goals  Short Term Goals  Time Frame for Short Term Goals: 1 week  Short Term Goal 1: Pt will complete LB dressing with LHAE and CGA to increase indep in home environment while maintaining hip precautions.-NOT MET  Short Term Goal 2: Pt will complete functional standing task x 5 minutes with 1 UE release and supervision to increase indep and endurance with all sinkside grooming and clothing management.-NOT MET  Short Term Goal 3: Pt will navigate to/from BR with SBA and min vcs for safety to increase indep and endurance with all toileting.-MET  Short Term Goal 4: Pt will perform full body bathing with min A and AE PRN to increase indep in home environment.-MET  Short Term Goal 5: Pt will demonstrate 3/3 hip precautions during ADL routine with no vcs to improve compliance with hip precautions.-NOT MET  Long Term Goals  Time Frame for Long Term Goals : 2 weeks from OT  evaluation  Long Term Goal 1: Pt will complete BADL routine with set up assistance to improve indep with self care routine-NOT MET CONTINUE  Long Term Goal 2: Pt will use walker to complete light IADL task with supervision to improve indep within her home.-NOT MET    Following session, patient left in safe position with all fall risk precautions in place.

## 2024-03-30 NOTE — PROGRESS NOTES
Patient discharged in stable condition as per order of attending physician to Home per staff at Time: 1128 am to car.    AVS provided by RN at time of discharge, which includes all necessary medical information pertaining to the patients current course of illness, treatment, medications, post-discharge goals of care, and treatment preferences.     Patient/ family verbalize understanding of discharge plan and are in agreement with goal/plan/treatment preferences.     Belongings including Glasses sent with patient.      Home medications sent home with patient N/A    Availability of \"My Chart\" offered to patient as a tool for updated health record.  Steps for activation discussed with patient as mentioned on AVS.

## 2024-03-30 NOTE — DISCHARGE SUMMARY
Physical Medicine & Rehabilitation   Discharge Summary     Patient Identification:  Deloris Watts  : 1955  Admit date: 3/14/2024  Discharge date: 3/30/2024  Attending provider: Jax Whatley MD        Primary care provider: Johana Weldon MD     Discharge Diagnoses:   Status post fall on 3/10/2024 in the bathroom resulting  Displaced left femur neck fracture requiring left hip hemiarthroplasty surgery  Head scalp contusion with cerebral concussion without loss of consciousness  Cervical spine sprain and muscular strain  History of stroke with left hemiparesis  End-stage renal disease requiring hemodialysis  History of fall resulting liver laceration and acute L2 compression fracture requiring kyphoplasty  Hypertension  Irritable bowel syndrome  Hyperlipidemia  Osteoporosis  Allergic rhinitis  Vomiting episode possibly due to GERD  History of hydronephrosis  History of fibromyalgia  History of depression and anxiety  History of right proximal humerus fracture requiring ORIF  History of right wrist fracture requiring casting  History of right ankle fracture requiring casting  Cognitive impairment     Discharge Functional Status:    Physical therapy:  Bed Mobility:  Rolling to Right: Stand By Assistance, with head of bed flat, without rail, with increased time for completion, verbal cues to complete with increased independence.   Supine to Sit: Minimal Assistance, with head of bed flat, without rail, with verbal cues , with increased time for completion  Sit to Supine: Contact Guard Assistance, at left LE    Scooting: Stand By Assistance, with verbal cues   - Patient requires increased encouragement to complete with increased independence.     Transfers:  Sit to Stand: Contact Guard Assistance  Stand to Sit:Contact Guard Assistance  Pt impulsive to sit down, encouraged her to back all the way up to surface and reach back    Car:Contact Guard Assistance, with increased time for completion, cues

## 2024-03-30 NOTE — PROGRESS NOTES
CAT scan were done.  The patient was found to have acute displaced left femur neck fracture.  She then was admitted.  Orthopedic was contacted to evaluate the hip fracture.  Nephrology service was consulted for continuing her 3 times daily hemodialysis.  Surgical intervention for the left femur neck displaced fracture was recommended.  The patient then underwent left hip hemiarthroplasty on 3/11/2024 by Dr. Virgilio Lezama for the left displaced femoral neck fracture.  She was allowed to have weightbearing as tolerated on left lower extremity postoperatively with posterior hip precaution.The patient was previously in inpatient rehab service from 12/21/2023 to 1/5/2024 for intensive inpatient management of impairment & disability secondary to fall accident on 12/14/2023 resulting L2 compression fracture requiring kyphoplasty, and liver laceration.  She was discharged to home on 1/5/2024 with referral for outpatient PT, OT and speech therapy.     Prior Level of Function:  Lives With: Spouse  Type of Home: House  Home Layout: One level  Home Access: Stairs to enter without rails  Entrance Stairs - Number of Steps: 1 MARIANA with left sided grab bar on the wall  Home Equipment: Walker, rolling, Walker, standard, Wheelchair-manual, Lift chair (transport chair)   Bathroom Shower/Tub: Tub/Shower unit  Bathroom Equipment: Shower chair, Grab bars in shower  Bathroom Accessibility: Not accessible    ADL Assistance: Independent  Homemaking Assistance: Needs assistance (spouse completes cooking, cleaning, laundry and grocery shopping)  Homemaking Responsibilities: No  Ambulation Assistance: Independent  Transfer Assistance: Independent  Active : No  Additional Comments: Patient reports she walks with a rolling walker PTA. Patient was on IPR in Dec 2023 for 2 weeks after falling and breaking L2 vertebra.  very supportive and able to assist at discharge.  takes patient to dialysis  independence with functional mobility.    Functional Outcome Measures: Completed     Modified [place] Scale:  Not Applicable    ASSESSMENT:  Assessment:  Pt cont to demonstrate weakness in LEs along with decreased balance and endurance. Pt cont to require cues or hands on assist w/ mobility and tolerating walking household distances. Pts spouse was supportive and aware of the hands on assist to provide at discharge. Pt would benefit from cont therapy at discharge to cont to work on strength, balance, endurance and for increased independence with functional mobility      PT Assessment: Patient overall has made progress with skilled PT treatment and has met 4/5 STG's and 0/6 LTG's. Patient has progressed to CGA for sit<>stand transfers, CGA for ambulation with use of RW limited distances due to increased complaints of pain and decreased endurance. Patient continues to demonstrate decreased strength in LLE resulting in increased difficulty with functional mobility. Patient to be discharged home with outpatient PT services at this time.   Activity Tolerance:  Patient tolerance of  treatment: fair.        Equipment Recommendations:Equipment Needed: No  Discharge Recommendations:  planning home with spouse, OP therapy   Plan: Patient to discharge home with Outpatient PT    Education  Education:  Learners: Patient  Patient Education: Plan of Care, Transfers, Gait, need for hands on assist at discharge     Goals:  Patient Goals : go home and decrease pain  Short Term Goals  Time Frame for Short Term Goals: 1 week  Short Term Goal 1: Patient will complete supine < > sit with head of bed elevated ~10 degrees, no rail and min assist to transfer in and out of bed with decreased difficulty. GOAL MET  Short Term Goal 2: Patient will complete sit  <> stand with CGA to stand to ambulate with decrease difficulty. GOAL MET  Short Term Goal 3: Patient will ambulate 20' with a RW and CGA to progress towards navigating home safely. GOAL

## 2024-03-30 NOTE — PROGRESS NOTES
[facility]   Nephrology progress note  3/30/2024, 10:10 AM      Pt Name:    Deloris Watts  MRN:     [id number]     YOB: 1955  Admit Date:    3/14/2024  5:39 PM    Chief Complaint: Nephrology following for ESRD on hemodialysis.    Subjective:  Patient seen and examined  No chest pain or shortness of breath  Overall feeling well for discharge later today    Objective:  24HR INTAKE/OUTPUT:    Intake/Output Summary (Last 24 hours) at 3/30/2024 1010  Last data filed at 3/29/2024 2100  Gross per 24 hour   Intake 740 ml   Output 1200 ml   Net -460 ml        Admission weight: 46.7 kg (103 lb)  Wt Readings from Last 3 Encounters:   03/30/24 39 kg (85 lb 15.7 oz)   03/13/24 42.7 kg (94 lb 2.2 oz)   02/22/24 47 kg (103 lb 9.6 oz)        Vitals :   Vitals:    03/29/24 1945 03/29/24 2248 03/30/24 0428 03/30/24 0847   BP: 126/66   (!) 124/55   Pulse: 97   92   Resp: 18 16  16   Temp: 99.2 °F (37.3 °C)   98.1 °F (36.7 °C)   TempSrc: Oral      SpO2: 98%   97%   Weight:   39 kg (85 lb 15.7 oz)    Height:           Physical examination  General Appearance:  Well developed. No distress  Mouth/Throat:  Oral mucosa moist  Neck:  Supple, no JVD  Lungs:  Breath sounds: clear  Heart::  S1,S2 heard  Abdomen:  Soft, non - tender  Musculoskeletal:  Edema - none    Medications:  Infusion:    sodium chloride      dextrose       Meds:    docusate sodium  100 mg Oral TID    bisacodyl  10 mg Rectal Daily    naloxegol  25 mg Oral QAM AC    linaclotide  290 mcg Oral QAM AC    megestrol  40 mg Oral Daily    pantoprazole  40 mg Oral QAM AC    traZODone  100 mg Oral Nightly    amLODIPine  5 mg Oral Daily    atorvastatin  10 mg Oral Daily    buPROPion  300 mg Oral Daily    clopidogrel  75 mg Oral Daily    folic acid  500 mcg Oral Daily    heparin (porcine)  5,000 Units SubCUTAneous BID    montelukast  10 mg Oral Daily    senna  2 tablet Oral Nightly    sevelamer  800 mg Oral Lunch    melatonin  6 mg Oral Nightly

## 2024-04-01 ENCOUNTER — COMMUNITY CARE MANAGEMENT (OUTPATIENT)
Facility: CLINIC | Age: 69
End: 2024-04-01

## 2024-04-01 NOTE — PROGRESS NOTES
places. (4)   24. Thinking over the past year, how much conflict have you had in your relationship with the patient? We have a little conflict between us. (3)   25. How mentally prepared are you to be a caregiver? I am very mentally prepared to be a caregiver. (4)    TOTAL SCORE 90.1       Instructions for Use/Scoring: The PATH-25 was designed to be a self-administered instrument to assess caregiver preparation/readiness prior to a patient's discharge from an inpatient rehabilitation facility. The sum score (1-100) or average score (1-4) of the items can be used to identify level of caregiver preparedness. Scores with \"not applicable\" are scored either 1.1 or 4.1.  For research purposes, this allows the \"not applicable ratings\" to be distinguished from a 1 or a 4 rating for data analyses.  For clinical purposes, a score of 1.1 = 1; a score of 4.1 = 4.  For clinical practice: After the caregiver completes the PATH-25, the nurse, therapist, or other clinician should review each item. A score of 2 or lower on any item indicates that the caregiver may not be prepared in that domain. Interventions should be tailored to address those items where the caregiver scores 2 or lower.

## 2024-04-02 NOTE — PROGRESS NOTES
Handicap Placard MISC by Does not apply route Expires in 5 years.  Dx: 1 each 0    bisacodyl (DULCOLAX) 10 MG suppository Place 1 suppository rectally daily as needed for Constipation 30 suppository 3    fluticasone (FLONASE) 50 MCG/ACT nasal spray 2 sprays by Each Nostril route daily 16 g 3    diclofenac sodium (VOLTAREN) 1 % GEL Apply 2 g topically 4 times daily as needed for Pain (Patient taking differently: Apply 2 g topically 4 times daily as needed for Pain Indications: uses on back (L2)) 150 g 3    polyethylene glycol (GLYCOLAX) 17 g packet Take 1 packet by mouth daily as needed for Constipation      simvastatin (ZOCOR) 20 MG tablet TAKE 1 TABLET BY MOUTH DAILY 90 tablet 3    sevelamer (RENVELA) 800 MG tablet Take 1 tablet by mouth with breakfast and with evening meal      linaclotide (LINZESS) 145 MCG capsule Take 2 capsules by mouth daily as needed (constipation) PRN      glucose monitoring (FREESTYLE FREEDOM) kit 1 kit by Does not apply route daily 1 kit 0    glucose monitoring (FREESTYLE FREEDOM) kit 1 kit by Does not apply route daily 1 kit 0    blood glucose monitor strips Test 1-2x/day. 100 strip 0    Lancets MISC 1 each by Does not apply route 2 times daily 100 each 0    ondansetron (ZOFRAN-ODT) 4 MG disintegrating tablet Take 1 tablet by mouth 3 times daily as needed for Nausea or Vomiting 21 tablet 0    cetirizine (ZYRTEC) 5 MG tablet Take 1 tablet by mouth nightly as needed for Allergies 30 tablet 0    folic acid (FOLVITE) 400 MCG tablet Take 1 tablet by mouth daily          Medications patient taking as of now reconciled against medications ordered at time of hospital discharge: Yes    A comprehensive review of systems was negative except for what was noted in the HPI.    Objective:    /62 (Site: Left Upper Arm, Position: Sitting, Cuff Size: Large Adult)   Pulse 74   Temp 97 °F (36.1 °C) (Temporal)   Resp 18   SpO2 95%     General Appearance: alert and oriented to person, place and time,

## 2024-04-04 ENCOUNTER — OFFICE VISIT (OUTPATIENT)
Dept: FAMILY MEDICINE CLINIC | Age: 69
End: 2024-04-04

## 2024-04-04 VITALS
RESPIRATION RATE: 18 BRPM | TEMPERATURE: 97 F | OXYGEN SATURATION: 95 % | HEART RATE: 74 BPM | SYSTOLIC BLOOD PRESSURE: 118 MMHG | DIASTOLIC BLOOD PRESSURE: 62 MMHG

## 2024-04-04 DIAGNOSIS — S72.002A CLOSED DISPLACED FRACTURE OF LEFT FEMORAL NECK (HCC): ICD-10-CM

## 2024-04-04 DIAGNOSIS — N18.6 ESRD ON HEMODIALYSIS (HCC): ICD-10-CM

## 2024-04-04 DIAGNOSIS — L98.9 FACE LESION: ICD-10-CM

## 2024-04-04 DIAGNOSIS — Z09 HOSPITAL DISCHARGE FOLLOW-UP: Primary | ICD-10-CM

## 2024-04-04 DIAGNOSIS — R35.0 URINARY FREQUENCY: ICD-10-CM

## 2024-04-04 DIAGNOSIS — Z99.2 ESRD ON HEMODIALYSIS (HCC): ICD-10-CM

## 2024-04-04 DIAGNOSIS — N89.8 VAGINAL ITCHING: ICD-10-CM

## 2024-04-04 LAB
BACTERIA URNS QL MICRO: ABNORMAL /HPF
BILIRUB UR QL STRIP.AUTO: NEGATIVE
BILIRUBIN, POC: NEGATIVE
BLOOD URINE, POC: NEGATIVE
CASTS #/AREA URNS LPF: ABNORMAL /LPF
CASTS 2: ABNORMAL /LPF
CHARACTER UR: CLEAR
CLARITY, POC: CLEAR
COLOR, POC: YELLOW
COLOR: YELLOW
CRYSTALS URNS MICRO: ABNORMAL
EPITHELIAL CELLS, UA: ABNORMAL /HPF
GLUCOSE UR QL STRIP.AUTO: 100 MG/DL
GLUCOSE URINE, POC: NEGATIVE
HGB UR QL STRIP.AUTO: NEGATIVE
KETONES UR QL STRIP.AUTO: NEGATIVE
KETONES, POC: NEGATIVE
LEUKOCYTE EST, POC: NEGATIVE
MISCELLANEOUS 2: ABNORMAL
NITRITE UR QL STRIP: NEGATIVE
NITRITE, POC: NEGATIVE
PH UR STRIP.AUTO: >= 9 [PH] (ref 5–9)
PH, POC: 8.5
PROT UR STRIP.AUTO-MCNC: 100 MG/DL
PROTEIN, POC: 100
RBC URINE: ABNORMAL /HPF
RENAL EPI CELLS #/AREA URNS HPF: ABNORMAL /[HPF]
SP GR UR REFRACT.AUTO: 1.01 (ref 1–1.03)
SPECIFIC GRAVITY, POC: 1.02
UROBILINOGEN, POC: 2
UROBILINOGEN, URINE: 0.2 EU/DL (ref 0–1)
WBC #/AREA URNS HPF: ABNORMAL /HPF
WBC #/AREA URNS HPF: NEGATIVE /[HPF]
YEAST LIKE FUNGI URNS QL MICRO: ABNORMAL

## 2024-04-04 RX ORDER — LIDOCAINE HYDROCHLORIDE AND EPINEPHRINE 10; 10 MG/ML; UG/ML
2 INJECTION, SOLUTION INFILTRATION; PERINEURAL ONCE
Status: COMPLETED | OUTPATIENT
Start: 2024-04-04 | End: 2024-04-04

## 2024-04-04 RX ORDER — POLYETHYLENE GLYCOL 3350, SODIUM SULFATE ANHYDROUS, SODIUM BICARBONATE, SODIUM CHLORIDE, POTASSIUM CHLORIDE 236; 22.74; 6.74; 5.86; 2.97 G/4L; G/4L; G/4L; G/4L; G/4L
240 POWDER, FOR SOLUTION ORAL ONCE
Qty: 240 ML | Refills: 1 | Status: SHIPPED | OUTPATIENT
Start: 2024-04-04 | End: 2024-04-04

## 2024-04-04 RX ADMIN — LIDOCAINE HYDROCHLORIDE AND EPINEPHRINE 2 ML: 10; 10 INJECTION, SOLUTION INFILTRATION; PERINEURAL at 14:46

## 2024-04-08 ENCOUNTER — TELEPHONE (OUTPATIENT)
Dept: FAMILY MEDICINE CLINIC | Age: 69
End: 2024-04-08

## 2024-04-08 RX ORDER — POLYETHYLENE GLYCOL 3350 17 G/17G
17 POWDER, FOR SOLUTION ORAL DAILY PRN
Qty: 527 G | Refills: 0 | Status: SHIPPED | OUTPATIENT
Start: 2024-04-08

## 2024-04-08 NOTE — TELEPHONE ENCOUNTER
[facility] calling for clarification for polyethylene glycol.  Medication that you sent to pharmacy is for bowel prep.   It comes at 4000ml bottle and directions are to drink 240ml every 15 minutes until empty.  Please clarify and resend to [facility] what you want.

## 2024-04-10 ENCOUNTER — APPOINTMENT (OUTPATIENT)
Dept: GENERAL RADIOLOGY | Age: 69
End: 2024-04-10
Payer: MEDICARE

## 2024-04-10 ENCOUNTER — HOSPITAL ENCOUNTER (INPATIENT)
Age: 69
LOS: 2 days | Discharge: HOME | End: 2024-04-12
Attending: STUDENT IN AN ORGANIZED HEALTH CARE EDUCATION/TRAINING PROGRAM
Payer: MEDICARE

## 2024-04-10 ENCOUNTER — APPOINTMENT (OUTPATIENT)
Dept: CT IMAGING | Age: 69
End: 2024-04-10
Payer: MEDICARE

## 2024-04-10 DIAGNOSIS — U07.1 ENCEPHALOPATHY DUE TO COVID-19 VIRUS: Primary | ICD-10-CM

## 2024-04-10 DIAGNOSIS — G93.49 ENCEPHALOPATHY DUE TO COVID-19 VIRUS: Primary | ICD-10-CM

## 2024-04-10 PROBLEM — Z99.2 ANEMIA DUE TO CHRONIC KIDNEY DISEASE, ON CHRONIC DIALYSIS (HCC): Status: ACTIVE | Noted: 2022-12-21

## 2024-04-10 PROBLEM — N18.6 ANEMIA DUE TO CHRONIC KIDNEY DISEASE, ON CHRONIC DIALYSIS (HCC): Status: ACTIVE | Noted: 2022-12-21

## 2024-04-10 PROBLEM — D63.1 ANEMIA DUE TO CHRONIC KIDNEY DISEASE, ON CHRONIC DIALYSIS (HCC): Status: ACTIVE | Noted: 2022-12-21

## 2024-04-10 PROBLEM — G92.8 TOXIC METABOLIC ENCEPHALOPATHY: Status: ACTIVE | Noted: 2024-04-10

## 2024-04-10 LAB
ALBUMIN SERPL BCG-MCNC: 3.9 G/DL (ref 3.5–5.1)
ALP SERPL-CCNC: 133 U/L (ref 38–126)
ALT SERPL W/O P-5'-P-CCNC: 12 U/L (ref 11–66)
AMMONIA PLAS-MCNC: 20 UMOL/L (ref 11–60)
ANION GAP SERPL CALC-SCNC: 16 MEQ/L (ref 8–16)
AST SERPL-CCNC: 17 U/L (ref 5–40)
BASOPHILS ABSOLUTE: 0 THOU/MM3 (ref 0–0.1)
BASOPHILS NFR BLD AUTO: 0.8 %
BILIRUB CONJ SERPL-MCNC: < 0.2 MG/DL (ref 0–0.3)
BILIRUB SERPL-MCNC: 0.3 MG/DL (ref 0.3–1.2)
BUN SERPL-MCNC: 51 MG/DL (ref 7–22)
CA-I BLD ISE-SCNC: 1.1 MMOL/L (ref 1.12–1.32)
CALCIUM SERPL-MCNC: 9.1 MG/DL (ref 8.5–10.5)
CHLORIDE SERPL-SCNC: 102 MEQ/L (ref 98–111)
CO2 SERPL-SCNC: 22 MEQ/L (ref 23–33)
CREAT SERPL-MCNC: 7.4 MG/DL (ref 0.4–1.2)
DEPRECATED RDW RBC AUTO: 48.3 FL (ref 35–45)
EOSINOPHIL NFR BLD AUTO: 4 %
EOSINOPHILS ABSOLUTE: 0.2 THOU/MM3 (ref 0–0.4)
ERYTHROCYTE [DISTWIDTH] IN BLOOD BY AUTOMATED COUNT: 13.2 % (ref 11.5–14.5)
FLUAV RNA RESP QL NAA+PROBE: NOT DETECTED
FLUBV RNA RESP QL NAA+PROBE: NOT DETECTED
GFR SERPL CREATININE-BSD FRML MDRD: 5 ML/MIN/1.73M2
GLUCOSE BLD STRIP.AUTO-MCNC: 140 MG/DL (ref 70–108)
GLUCOSE BLD STRIP.AUTO-MCNC: 156 MG/DL (ref 70–108)
GLUCOSE BLD STRIP.AUTO-MCNC: 55 MG/DL (ref 70–108)
GLUCOSE BLD STRIP.AUTO-MCNC: 60 MG/DL (ref 70–108)
GLUCOSE BLD STRIP.AUTO-MCNC: 66 MG/DL (ref 70–108)
GLUCOSE SERPL-MCNC: 50 MG/DL (ref 70–108)
HCT VFR BLD AUTO: 29.8 % (ref 37–47)
HGB BLD-MCNC: 9.5 GM/DL (ref 12–16)
IMM GRANULOCYTES # BLD AUTO: 0.1 THOU/MM3 (ref 0–0.07)
IMM GRANULOCYTES NFR BLD AUTO: 1.7 %
LIPASE SERPL-CCNC: 15.1 U/L (ref 5.6–51.3)
LYMPHOCYTES ABSOLUTE: 1.2 THOU/MM3 (ref 1–4.8)
LYMPHOCYTES NFR BLD AUTO: 19.5 %
MAGNESIUM SERPL-MCNC: 2.3 MG/DL (ref 1.6–2.4)
MCH RBC QN AUTO: 31.9 PG (ref 26–33)
MCHC RBC AUTO-ENTMCNC: 31.9 GM/DL (ref 32.2–35.5)
MCV RBC AUTO: 100 FL (ref 81–99)
MONOCYTES ABSOLUTE: 0.3 THOU/MM3 (ref 0.4–1.3)
MONOCYTES NFR BLD AUTO: 5.5 %
NEUTROPHILS NFR BLD AUTO: 68.5 %
NRBC BLD AUTO-RTO: 0 /100 WBC
OSMOLALITY SERPL CALC.SUM OF ELEC: 290.4 MOSMOL/KG (ref 275–300)
PHOSPHATE SERPL-MCNC: 4.6 MG/DL (ref 2.4–4.7)
PLATELET # BLD AUTO: 256 THOU/MM3 (ref 130–400)
PMV BLD AUTO: 10 FL (ref 9.4–12.4)
POTASSIUM SERPL-SCNC: 4.4 MEQ/L (ref 3.5–5.2)
PROT SERPL-MCNC: 6.7 G/DL (ref 6.1–8)
RBC # BLD AUTO: 2.98 MILL/MM3 (ref 4.2–5.4)
SARS-COV-2 RNA RESP QL NAA+PROBE: DETECTED
SEGMENTED NEUTROPHILS ABSOLUTE COUNT: 4.1 THOU/MM3 (ref 1.8–7.7)
SODIUM SERPL-SCNC: 140 MEQ/L (ref 135–145)
TROPONIN, HIGH SENSITIVITY: 68 NG/L (ref 0–12)
WBC # BLD AUTO: 6 THOU/MM3 (ref 4.8–10.8)

## 2024-04-10 PROCEDURE — 85025 COMPLETE CBC W/AUTO DIFF WBC: CPT

## 2024-04-10 PROCEDURE — 84100 ASSAY OF PHOSPHORUS: CPT

## 2024-04-10 PROCEDURE — 6360000002 HC RX W HCPCS: Performed by: PHYSICIAN ASSISTANT

## 2024-04-10 PROCEDURE — 6370000000 HC RX 637 (ALT 250 FOR IP): Performed by: PHYSICIAN ASSISTANT

## 2024-04-10 PROCEDURE — 5A1D70Z PERFORMANCE OF URINARY FILTRATION, INTERMITTENT, LESS THAN 6 HOURS PER DAY: ICD-10-PCS | Performed by: FAMILY MEDICINE

## 2024-04-10 PROCEDURE — 96366 THER/PROPH/DIAG IV INF ADDON: CPT

## 2024-04-10 PROCEDURE — 93005 ELECTROCARDIOGRAM TRACING: CPT | Performed by: STUDENT IN AN ORGANIZED HEALTH CARE EDUCATION/TRAINING PROGRAM

## 2024-04-10 PROCEDURE — 2580000003 HC RX 258: Performed by: PHYSICIAN ASSISTANT

## 2024-04-10 PROCEDURE — 99285 EMERGENCY DEPT VISIT HI MDM: CPT

## 2024-04-10 PROCEDURE — 82330 ASSAY OF CALCIUM: CPT

## 2024-04-10 PROCEDURE — 82248 BILIRUBIN DIRECT: CPT

## 2024-04-10 PROCEDURE — 84484 ASSAY OF TROPONIN QUANT: CPT

## 2024-04-10 PROCEDURE — 99223 1ST HOSP IP/OBS HIGH 75: CPT | Performed by: PHYSICIAN ASSISTANT

## 2024-04-10 PROCEDURE — 70450 CT HEAD/BRAIN W/O DYE: CPT

## 2024-04-10 PROCEDURE — 93010 ELECTROCARDIOGRAM REPORT: CPT | Performed by: NUCLEAR MEDICINE

## 2024-04-10 PROCEDURE — 83735 ASSAY OF MAGNESIUM: CPT

## 2024-04-10 PROCEDURE — 1200000003 HC TELEMETRY R&B

## 2024-04-10 PROCEDURE — 83690 ASSAY OF LIPASE: CPT

## 2024-04-10 PROCEDURE — 82140 ASSAY OF AMMONIA: CPT

## 2024-04-10 PROCEDURE — 96365 THER/PROPH/DIAG IV INF INIT: CPT

## 2024-04-10 PROCEDURE — 36415 COLL VENOUS BLD VENIPUNCTURE: CPT

## 2024-04-10 PROCEDURE — 90935 HEMODIALYSIS ONE EVALUATION: CPT | Performed by: INTERNAL MEDICINE

## 2024-04-10 PROCEDURE — 2500000003 HC RX 250 WO HCPCS: Performed by: STUDENT IN AN ORGANIZED HEALTH CARE EDUCATION/TRAINING PROGRAM

## 2024-04-10 PROCEDURE — 87636 SARSCOV2 & INF A&B AMP PRB: CPT

## 2024-04-10 PROCEDURE — 71045 X-RAY EXAM CHEST 1 VIEW: CPT

## 2024-04-10 PROCEDURE — 80053 COMPREHEN METABOLIC PANEL: CPT

## 2024-04-10 PROCEDURE — 82948 REAGENT STRIP/BLOOD GLUCOSE: CPT

## 2024-04-10 PROCEDURE — 90935 HEMODIALYSIS ONE EVALUATION: CPT

## 2024-04-10 RX ORDER — CLOPIDOGREL BISULFATE 75 MG/1
75 TABLET ORAL DAILY
Status: DISCONTINUED | OUTPATIENT
Start: 2024-04-11 | End: 2024-04-12 | Stop reason: HOSPADM

## 2024-04-10 RX ORDER — ATORVASTATIN CALCIUM 10 MG/1
10 TABLET, FILM COATED ORAL DAILY
Status: DISCONTINUED | OUTPATIENT
Start: 2024-04-10 | End: 2024-04-12 | Stop reason: HOSPADM

## 2024-04-10 RX ORDER — ACETAMINOPHEN 650 MG/1
650 SUPPOSITORY RECTAL EVERY 6 HOURS PRN
Status: DISCONTINUED | OUTPATIENT
Start: 2024-04-10 | End: 2024-04-12 | Stop reason: HOSPADM

## 2024-04-10 RX ORDER — ONDANSETRON 2 MG/ML
4 INJECTION INTRAMUSCULAR; INTRAVENOUS EVERY 6 HOURS PRN
Status: DISCONTINUED | OUTPATIENT
Start: 2024-04-10 | End: 2024-04-12 | Stop reason: HOSPADM

## 2024-04-10 RX ORDER — BUPROPION HYDROCHLORIDE 300 MG/1
300 TABLET ORAL DAILY
Status: DISCONTINUED | OUTPATIENT
Start: 2024-04-11 | End: 2024-04-12 | Stop reason: HOSPADM

## 2024-04-10 RX ORDER — MEGESTROL ACETATE 40 MG/1
40 TABLET ORAL DAILY
Status: DISCONTINUED | OUTPATIENT
Start: 2024-04-11 | End: 2024-04-12 | Stop reason: HOSPADM

## 2024-04-10 RX ORDER — GLUCAGON 1 MG/ML
1 KIT INJECTION PRN
Status: DISCONTINUED | OUTPATIENT
Start: 2024-04-10 | End: 2024-04-12 | Stop reason: HOSPADM

## 2024-04-10 RX ORDER — SEVELAMER CARBONATE 800 MG/1
800 TABLET, FILM COATED ORAL 2 TIMES DAILY WITH MEALS
Status: DISCONTINUED | OUTPATIENT
Start: 2024-04-11 | End: 2024-04-12 | Stop reason: HOSPADM

## 2024-04-10 RX ORDER — DEXTROSE MONOHYDRATE 100 MG/ML
INJECTION, SOLUTION INTRAVENOUS CONTINUOUS PRN
Status: DISCONTINUED | OUTPATIENT
Start: 2024-04-10 | End: 2024-04-12 | Stop reason: HOSPADM

## 2024-04-10 RX ORDER — SODIUM CHLORIDE 9 MG/ML
INJECTION, SOLUTION INTRAVENOUS PRN
Status: DISCONTINUED | OUTPATIENT
Start: 2024-04-10 | End: 2024-04-12 | Stop reason: HOSPADM

## 2024-04-10 RX ORDER — POLYETHYLENE GLYCOL 3350 17 G/17G
17 POWDER, FOR SOLUTION ORAL DAILY PRN
Status: DISCONTINUED | OUTPATIENT
Start: 2024-04-10 | End: 2024-04-12 | Stop reason: HOSPADM

## 2024-04-10 RX ORDER — ONDANSETRON 4 MG/1
4 TABLET, ORALLY DISINTEGRATING ORAL EVERY 8 HOURS PRN
Status: DISCONTINUED | OUTPATIENT
Start: 2024-04-10 | End: 2024-04-12 | Stop reason: HOSPADM

## 2024-04-10 RX ORDER — HEPARIN SODIUM 5000 [USP'U]/ML
5000 INJECTION, SOLUTION INTRAVENOUS; SUBCUTANEOUS 2 TIMES DAILY
Status: DISCONTINUED | OUTPATIENT
Start: 2024-04-10 | End: 2024-04-12 | Stop reason: HOSPADM

## 2024-04-10 RX ORDER — TRAZODONE HYDROCHLORIDE 100 MG/1
100 TABLET ORAL NIGHTLY
Status: DISCONTINUED | OUTPATIENT
Start: 2024-04-10 | End: 2024-04-12 | Stop reason: HOSPADM

## 2024-04-10 RX ORDER — DOCUSATE SODIUM 100 MG/1
100 CAPSULE, LIQUID FILLED ORAL 3 TIMES DAILY
Status: DISCONTINUED | OUTPATIENT
Start: 2024-04-10 | End: 2024-04-12 | Stop reason: HOSPADM

## 2024-04-10 RX ORDER — SENNOSIDES A AND B 8.6 MG/1
2 TABLET, FILM COATED ORAL NIGHTLY
Status: DISCONTINUED | OUTPATIENT
Start: 2024-04-10 | End: 2024-04-12 | Stop reason: HOSPADM

## 2024-04-10 RX ORDER — CALCIUM GLUCONATE 10 MG/ML
1000 INJECTION, SOLUTION INTRAVENOUS ONCE
Status: COMPLETED | OUTPATIENT
Start: 2024-04-10 | End: 2024-04-10

## 2024-04-10 RX ORDER — LANOLIN ALCOHOL/MO/W.PET/CERES
6 CREAM (GRAM) TOPICAL NIGHTLY
Status: DISCONTINUED | OUTPATIENT
Start: 2024-04-10 | End: 2024-04-12 | Stop reason: HOSPADM

## 2024-04-10 RX ORDER — ACETAMINOPHEN 325 MG/1
650 TABLET ORAL EVERY 6 HOURS PRN
Status: DISCONTINUED | OUTPATIENT
Start: 2024-04-10 | End: 2024-04-12 | Stop reason: HOSPADM

## 2024-04-10 RX ORDER — BISACODYL 10 MG
10 SUPPOSITORY, RECTAL RECTAL DAILY PRN
Status: DISCONTINUED | OUTPATIENT
Start: 2024-04-10 | End: 2024-04-12 | Stop reason: HOSPADM

## 2024-04-10 RX ORDER — CALCIUM GLUCONATE 94 MG/ML
1000 INJECTION, SOLUTION INTRAVENOUS ONCE
Status: DISCONTINUED | OUTPATIENT
Start: 2024-04-10 | End: 2024-04-10 | Stop reason: ALTCHOICE

## 2024-04-10 RX ORDER — AMLODIPINE BESYLATE 5 MG/1
5 TABLET ORAL DAILY
Status: DISCONTINUED | OUTPATIENT
Start: 2024-04-11 | End: 2024-04-12 | Stop reason: HOSPADM

## 2024-04-10 RX ORDER — SODIUM CHLORIDE 0.9 % (FLUSH) 0.9 %
5-40 SYRINGE (ML) INJECTION PRN
Status: DISCONTINUED | OUTPATIENT
Start: 2024-04-10 | End: 2024-04-12 | Stop reason: HOSPADM

## 2024-04-10 RX ORDER — SODIUM CHLORIDE 0.9 % (FLUSH) 0.9 %
5-40 SYRINGE (ML) INJECTION EVERY 12 HOURS SCHEDULED
Status: DISCONTINUED | OUTPATIENT
Start: 2024-04-10 | End: 2024-04-12 | Stop reason: HOSPADM

## 2024-04-10 RX ORDER — POLYETHYLENE GLYCOL 3350 17 G/17G
17 POWDER, FOR SOLUTION ORAL DAILY PRN
Status: DISCONTINUED | OUTPATIENT
Start: 2024-04-10 | End: 2024-04-10

## 2024-04-10 RX ORDER — PANTOPRAZOLE SODIUM 40 MG/1
40 TABLET, DELAYED RELEASE ORAL
Status: DISCONTINUED | OUTPATIENT
Start: 2024-04-11 | End: 2024-04-12 | Stop reason: HOSPADM

## 2024-04-10 RX ORDER — FOLIC ACID 1 MG/1
500 TABLET ORAL DAILY
Status: DISCONTINUED | OUTPATIENT
Start: 2024-04-11 | End: 2024-04-12 | Stop reason: HOSPADM

## 2024-04-10 RX ADMIN — HEPARIN SODIUM 5000 UNITS: 5000 INJECTION INTRAVENOUS; SUBCUTANEOUS at 20:52

## 2024-04-10 RX ADMIN — Medication 6 MG: at 20:52

## 2024-04-10 RX ADMIN — TRAZODONE HYDROCHLORIDE 100 MG: 100 TABLET ORAL at 20:52

## 2024-04-10 RX ADMIN — Medication 16 G: at 18:45

## 2024-04-10 RX ADMIN — ATORVASTATIN CALCIUM 10 MG: 10 TABLET, FILM COATED ORAL at 21:17

## 2024-04-10 RX ADMIN — CALCIUM GLUCONATE 1000 MG: 10 INJECTION, SOLUTION INTRAVENOUS at 12:00

## 2024-04-10 RX ADMIN — SODIUM CHLORIDE, PRESERVATIVE FREE 10 ML: 5 INJECTION INTRAVENOUS at 20:52

## 2024-04-10 ASSESSMENT — PAIN - FUNCTIONAL ASSESSMENT
PAIN_FUNCTIONAL_ASSESSMENT: NONE - DENIES PAIN

## 2024-04-10 NOTE — CONSULTS
Patient seen and examined by me during hemodialysis  Awake and alert does not appear to be confused  Blood pressure 138 systolic UF 1900 mL  See full consult for details    To Cuadra MD

## 2024-04-10 NOTE — ED NOTES
Pt. Resting in bed with even and unlabored respirations. Pt. Denies any pain. Provided apple juice and lunch box for low blood sugar. Pt. Updated about plan of care and treatment. Family at bedside. Pt. Has no further concerns, questions or needs at this time. Call light within reach.

## 2024-04-10 NOTE — FLOWSHEET NOTE
04/10/24 1814   Vital Signs   BP (!) 132/44   Temp 97.1 °F (36.2 °C)   Pulse 88   Respirations 16   Weight - Scale 45.7 kg (100 lb 12 oz)   Weight Method Bed scale   Percent Weight Change -3.18   Post-Hemodialysis Assessment   Post-Treatment Procedures Blood returned;Catheter Capped, clamped with Saline x2 ports   Machine Disinfection Process Acid/Vinegar Clean;Heat Disinfect;Exterior Machine Disinfection   Rinseback Volume (ml) 400 ml   Blood Volume Processed (Liters) 56.3 L   Dialyzer Clearance Lightly streaked   Duration of Treatment (minutes) 150 minutes   Heparin Amount Administered During Treatment (mL) 0 mL   Hemodialysis Intake (ml) 400 ml   Hemodialysis Output (ml) 1900 ml   NET Removed (ml) 1500     Stable 2.5 hour TX completed. Removed 1.5 L of fluid. pt tolerated fluid removal well. Bilateral cath ports flushed with normal saline, clamped, and secured with tegos. CVC drsg clean, dry, and intact. Report called to primary RN. TX record printed for scanning into EMR.

## 2024-04-10 NOTE — ED NOTES
Pt. Resting in bed with even and unlabored respirations. Pt. Denies any pain.  Pt. Updated about plan of care and treatment. Family at bedside. Pt. Has no further concerns, questions or needs at this time. Call light within reach.

## 2024-04-10 NOTE — ED NOTES
Pt. Resting in bed with even and unlabored respirations. Pt.denies any pain. Medicated per mar. Provided oral swabs. Pt. Updated about plan of care and treatment. Family at bedside. Pt. Has no further concerns, questions or needs at this time. Call light within reach.

## 2024-04-10 NOTE — PROGRESS NOTES
1040 - Call to FLY Pierce, to determine level. SW will wait for patient to be assigned a medical provider to discuss level  1106 - Call to medical provider, Dr. Medel; just speak with patient and family; he thinks it is medical since patient has missed dialysis since Sunday 1108 - In to see patient. Denies suicidal or homicidal thoughts or thoughts of hurting herself. Patient states that \"God is not ready for me yet.\" Patient not oriented to place, situation, or time. SW discussed medical clearance and what the next steps are if patient is to go medical  1540 - Patient admitted; medical ready to plan

## 2024-04-10 NOTE — ED PROVIDER NOTES
instructions and appropriate follow-up were provided to the patient.     ED Medications administered this visit:  (None if blank)  Medications   sodium chloride flush 0.9 % injection 5-40 mL (has no administration in time range)   sodium chloride flush 0.9 % injection 5-40 mL (has no administration in time range)   0.9 % sodium chloride infusion (has no administration in time range)   heparin (porcine) injection 5,000 Units (has no administration in time range)   ondansetron (ZOFRAN-ODT) disintegrating tablet 4 mg (has no administration in time range)     Or   ondansetron (ZOFRAN) injection 4 mg (has no administration in time range)   polyethylene glycol (GLYCOLAX) packet 17 g (has no administration in time range)   acetaminophen (TYLENOL) tablet 650 mg (has no administration in time range)     Or   acetaminophen (TYLENOL) suppository 650 mg (has no administration in time range)   glucose chewable tablet 16 g (16 g Oral Given 4/10/24 3785)   dextrose bolus 10% 125 mL (has no administration in time range)     Or   dextrose bolus 10% 250 mL (has no administration in time range)   glucagon injection 1 mg (has no administration in time range)   dextrose 10 % infusion (has no administration in time range)   amLODIPine (NORVASC) tablet 5 mg (has no administration in time range)   buPROPion (WELLBUTRIN XL) extended release tablet 300 mg (has no administration in time range)   clopidogrel (PLAVIX) tablet 75 mg (has no administration in time range)   docusate (COLACE, DULCOLAX) CAPS 100 mg (has no administration in time range)   folic acid (FOLVITE) tablet 400 mcg (has no administration in time range)   linaclotide (LINZESS) capsule 290 mcg (has no administration in time range)   megestrol (MEGACE) tablet 40 mg (has no administration in time range)   melatonin tablet 6 mg (has no administration in time range)   pantoprazole (PROTONIX) tablet 40 mg (has no administration in time range)   senna (SENOKOT) tablet 17.2 mg (has no  administration in time range)   sevelamer (RENVELA) tablet 800 mg (has no administration in time range)   atorvastatin (LIPITOR) tablet 10 mg (has no administration in time range)   traZODone (DESYREL) tablet 100 mg (has no administration in time range)   bisacodyl (DULCOLAX) suppository 10 mg (has no administration in time range)   calcium gluconate 1000 mg in sodium chloride 100 mL (0 mg IntraVENous Stopped 4/10/24 1340)       PROCEDURES: (None if blank)  Procedures:     CRITICAL CARE: (None if blank)    DISCHARGE PRESCRIPTIONS: (None if blank)  Current Discharge Medication List            FINAL IMPRESSION     Final diagnoses:   Encephalopathy due to COVID-19 virus     1. Encephalopathy due to COVID-19 virus        DISPOSITION / PLAN   DISPOSITION Admitted 04/10/2024 03:32:51 PM      OUTPATIENT FOLLOW UP THE PATIENT:  No follow-up provider specified.      This transcription was electronically signed. Parts of this transcriptions may have been dictated by use of voice recognition software and electronically transcribed, and parts may have been transcribed with the assistance of an ED scribe. The transcription may contain errors not detected in proofreading. Please refer to my supervising physician's documentation if my documentation differs.    Electronically Signed: Ferdinand Medel DO, 04/10/24, 7:59 PM        Ferdinand Medel DO  Resident  04/10/24 2010     (4) no limitation
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
## 2024-04-10 NOTE — ED NOTES
Pt arrives to the ED for altered mental status and hallucinations. Pt  states the pt began hallucinating and calling neighbors and family telling them she was going to die today. Pt  is not oriented to place, situation or time at baseline. Pt was supposed to have dialysis on Sunday however missed her apt. Pt denies any pain, respirations are unlabored. VSS

## 2024-04-10 NOTE — H&P
Hospitalist History & Physical    Patient:  Deloris Watts    Unit/Bed:ROSEMARIE /ROSEMARIE  YOB: 1955  MRN: [id number]   Acct: [id number]   PCP: Johana Weldon MD  Code Status: Full Code    Date of Service: The patient was seen and examined on 04/10/24 and admitted to Inpatient with an expected length of stay of less than two midnights due to medical therapy.     Chief Complaint: altered mental status, hallucinations    Assessment/Plan:    Toxic metabolic encephalopathy  Likely secondary to COVID-19 and hypoglycemia superimposed on baseline cognitive impairment.  Patient alert and oriented to person, place, and age.  She does recall  Patient's  reports she has been having hallucinations, delusions, and stating that she does not want to be alive anymore.  Consult to psychiatry for further evaluation.  Patient denied any suicidal ideations in the emergency department.  SLP consult.  Avoid sedating medications.  Hypoglycemia  Glucose noted to be 60 initially. Suspect secondary to poor oral intake.  Hypoglycemia protocol placed.  Dietitian consult.  Continue megestrol.  COVID-19  Not currently requiring supplemental oxygen.  Supportive care.  ESRD on hemodialysis  Nephrology consulted.  Undergoes hemodialysis Monday Wednesday Friday.  Currently undergoing dialysis.  Continue sevelamer.  Constipation  Continue home bowel regimen.  Gastroesophageal reflux disease  Continue Protonix  Hypertension  Continue home regimen  Anemia in CKD  Hemoglobin 9.5, around baseline.  Unspecified depression, anxiety  Continue home psychiatric medication regimen.  Psychiatry consult as above.  History of ischemic stroke  Continue Plavix  Hyperlipidemia  Continue simvastatin  History of left hip fracture  S/p left raheel hip arthroplasty on 3/11/2024.  PT/OT consult.  Goals of care  Discussed CODE STATUS with patient's  as the patient is encephalopathic.  At this time, he would like the patient to be a full  code.    History of Present Illness:  Deloris Watts is a 68 y.o. female with a history of ESRD on hemodialysis, stroke, anemia and CKD, depression and anxiety, hyperlipidemia, hypertension, gastroesophageal reflux disease, and recent left hip fracture who presented to [facility] with chief complaint of altered mental status and hallucinations.  History is provided by the patient as well as the patient's .  The patient's  reports that the patient has some baseline cognitive impairment.  She will often have difficulty with orientation questions.  She was recently admitted to the hospital for a left hip fracture and underwent a left Misael hip arthroplasty on 3/11/2024.  She was discharged to inpatient rehab and underwent a short stay in rehab following this.  She was subsequently then discharged home.  The patient's  reports that she has not been doing her exercises at home and has not been eating well.  He reports that she has made comments in the past about not wanting to be alive.  More recently, the patient began to have visual hallucinations and also delusions.  She apparently called her neighbor and one of her relatives this morning and was telling them that she was going to die today.  As such, she was brought to the hospital for further evaluation.  In the emergency department, she tested positive for COVID-19.  She does endorse a cough and some shortness of breath.  No fevers or chills.  The patient also did miss dialysis on Monday as she was ill.  As such, she was last dialyzed on Friday.  At this time, the patient is alert and oriented to person, age, and place.  She does exhibit some tangential thought processes and disorganized speech.  She does recall having hallucinations but denies any active hallucinations at this time.    Review of Systems: Pertinent positives as noted in the HPI. All other systems reviewed and negative.    Past Medical History:        Diagnosis Date    Allergic

## 2024-04-10 NOTE — ED NOTES
Pt BS 60, asked to drink more apple juice. Snacks sent to dialysis.  Pt departing for dialysis at this time. Pt respirations are unlabored, VSS. Denies any pain. Report given to Shirley martin RN

## 2024-04-11 LAB
ANION GAP SERPL CALC-SCNC: 12 MEQ/L (ref 8–16)
BACTERIA URNS QL MICRO: ABNORMAL /HPF
BASOPHILS ABSOLUTE: 0.1 THOU/MM3 (ref 0–0.1)
BASOPHILS NFR BLD AUTO: 0.9 %
BILIRUB UR QL STRIP.AUTO: NEGATIVE
BUN SERPL-MCNC: 24 MG/DL (ref 7–22)
CA-I BLD ISE-SCNC: 1.14 MMOL/L (ref 1.12–1.32)
CALCIUM SERPL-MCNC: 9 MG/DL (ref 8.5–10.5)
CASTS #/AREA URNS LPF: ABNORMAL /LPF
CASTS 2: ABNORMAL /LPF
CHARACTER UR: CLEAR
CHLORIDE SERPL-SCNC: 100 MEQ/L (ref 98–111)
CO2 SERPL-SCNC: 28 MEQ/L (ref 23–33)
COLOR: YELLOW
CREAT SERPL-MCNC: 4 MG/DL (ref 0.4–1.2)
CRYSTALS URNS MICRO: ABNORMAL
DEPRECATED RDW RBC AUTO: 47.8 FL (ref 35–45)
EKG ATRIAL RATE: 82 BPM
EKG P AXIS: 80 DEGREES
EKG P-R INTERVAL: 142 MS
EKG Q-T INTERVAL: 388 MS
EKG QRS DURATION: 128 MS
EKG QTC CALCULATION (BAZETT): 453 MS
EKG R AXIS: 76 DEGREES
EKG T AXIS: 63 DEGREES
EKG VENTRICULAR RATE: 82 BPM
EOSINOPHIL NFR BLD AUTO: 4.7 %
EOSINOPHILS ABSOLUTE: 0.3 THOU/MM3 (ref 0–0.4)
EPITHELIAL CELLS, UA: ABNORMAL /HPF
ERYTHROCYTE [DISTWIDTH] IN BLOOD BY AUTOMATED COUNT: 13.2 % (ref 11.5–14.5)
GFR SERPL CREATININE-BSD FRML MDRD: 11 ML/MIN/1.73M2
GLUCOSE BLD STRIP.AUTO-MCNC: 91 MG/DL (ref 70–108)
GLUCOSE SERPL-MCNC: 83 MG/DL (ref 70–108)
GLUCOSE UR QL STRIP.AUTO: NEGATIVE MG/DL
HCT VFR BLD AUTO: 30.7 % (ref 37–47)
HGB BLD-MCNC: 9.7 GM/DL (ref 12–16)
HGB UR QL STRIP.AUTO: NEGATIVE
IMM GRANULOCYTES # BLD AUTO: 0.1 THOU/MM3 (ref 0–0.07)
IMM GRANULOCYTES NFR BLD AUTO: 1.5 %
KETONES UR QL STRIP.AUTO: NEGATIVE
LYMPHOCYTES ABSOLUTE: 2 THOU/MM3 (ref 1–4.8)
LYMPHOCYTES NFR BLD AUTO: 29.4 %
MCH RBC QN AUTO: 31.4 PG (ref 26–33)
MCHC RBC AUTO-ENTMCNC: 31.6 GM/DL (ref 32.2–35.5)
MCV RBC AUTO: 99.4 FL (ref 81–99)
MISCELLANEOUS 2: ABNORMAL
MONOCYTES ABSOLUTE: 0.6 THOU/MM3 (ref 0.4–1.3)
MONOCYTES NFR BLD AUTO: 9.6 %
NEUTROPHILS NFR BLD AUTO: 53.9 %
NITRITE UR QL STRIP: NEGATIVE
NRBC BLD AUTO-RTO: 0 /100 WBC
OSMOLALITY SERPL CALC.SUM OF ELEC: 282.6 MOSMOL/KG (ref 275–300)
PH UR STRIP.AUTO: >= 9 [PH] (ref 5–9)
PLATELET # BLD AUTO: 279 THOU/MM3 (ref 130–400)
PMV BLD AUTO: 10 FL (ref 9.4–12.4)
POTASSIUM SERPL-SCNC: 4.9 MEQ/L (ref 3.5–5.2)
PROT UR STRIP.AUTO-MCNC: 100 MG/DL
RBC # BLD AUTO: 3.09 MILL/MM3 (ref 4.2–5.4)
RBC URINE: ABNORMAL /HPF
RENAL EPI CELLS #/AREA URNS HPF: ABNORMAL /[HPF]
SEGMENTED NEUTROPHILS ABSOLUTE COUNT: 3.6 THOU/MM3 (ref 1.8–7.7)
SODIUM SERPL-SCNC: 140 MEQ/L (ref 135–145)
SP GR UR REFRACT.AUTO: 1.01 (ref 1–1.03)
UROBILINOGEN, URINE: 0.2 EU/DL (ref 0–1)
WBC # BLD AUTO: 6.7 THOU/MM3 (ref 4.8–10.8)
WBC #/AREA URNS HPF: ABNORMAL /HPF
WBC #/AREA URNS HPF: NEGATIVE /[HPF]
YEAST LIKE FUNGI URNS QL MICRO: ABNORMAL

## 2024-04-11 PROCEDURE — 90792 PSYCH DIAG EVAL W/MED SRVCS: CPT | Performed by: PSYCHIATRY & NEUROLOGY

## 2024-04-11 PROCEDURE — 80048 BASIC METABOLIC PNL TOTAL CA: CPT

## 2024-04-11 PROCEDURE — 99232 SBSQ HOSP IP/OBS MODERATE 35: CPT | Performed by: PHYSICIAN ASSISTANT

## 2024-04-11 PROCEDURE — 82948 REAGENT STRIP/BLOOD GLUCOSE: CPT

## 2024-04-11 PROCEDURE — 2580000003 HC RX 258: Performed by: PHYSICIAN ASSISTANT

## 2024-04-11 PROCEDURE — 97535 SELF CARE MNGMENT TRAINING: CPT

## 2024-04-11 PROCEDURE — 6360000002 HC RX W HCPCS: Performed by: PHYSICIAN ASSISTANT

## 2024-04-11 PROCEDURE — 82330 ASSAY OF CALCIUM: CPT

## 2024-04-11 PROCEDURE — 97162 PT EVAL MOD COMPLEX 30 MIN: CPT

## 2024-04-11 PROCEDURE — 97530 THERAPEUTIC ACTIVITIES: CPT

## 2024-04-11 PROCEDURE — 6370000000 HC RX 637 (ALT 250 FOR IP): Performed by: PHYSICIAN ASSISTANT

## 2024-04-11 PROCEDURE — 1200000003 HC TELEMETRY R&B

## 2024-04-11 PROCEDURE — 97166 OT EVAL MOD COMPLEX 45 MIN: CPT

## 2024-04-11 PROCEDURE — 36415 COLL VENOUS BLD VENIPUNCTURE: CPT

## 2024-04-11 PROCEDURE — 85025 COMPLETE CBC W/AUTO DIFF WBC: CPT

## 2024-04-11 PROCEDURE — 99232 SBSQ HOSP IP/OBS MODERATE 35: CPT | Performed by: INTERNAL MEDICINE

## 2024-04-11 PROCEDURE — 81001 URINALYSIS AUTO W/SCOPE: CPT

## 2024-04-11 PROCEDURE — 51798 US URINE CAPACITY MEASURE: CPT

## 2024-04-11 RX ADMIN — AMLODIPINE BESYLATE 5 MG: 5 TABLET ORAL at 08:29

## 2024-04-11 RX ADMIN — TRAZODONE HYDROCHLORIDE 100 MG: 100 TABLET ORAL at 21:54

## 2024-04-11 RX ADMIN — ATORVASTATIN CALCIUM 10 MG: 10 TABLET, FILM COATED ORAL at 21:54

## 2024-04-11 RX ADMIN — FOLIC ACID 500 MCG: 1 TABLET ORAL at 08:30

## 2024-04-11 RX ADMIN — CLOPIDOGREL BISULFATE 75 MG: 75 TABLET ORAL at 08:29

## 2024-04-11 RX ADMIN — MEGESTROL ACETATE 40 MG: 40 TABLET ORAL at 08:29

## 2024-04-11 RX ADMIN — PANTOPRAZOLE SODIUM 40 MG: 40 TABLET, DELAYED RELEASE ORAL at 08:30

## 2024-04-11 RX ADMIN — DOCUSATE SODIUM 100 MG: 100 CAPSULE, LIQUID FILLED ORAL at 14:47

## 2024-04-11 RX ADMIN — SEVELAMER CARBONATE 800 MG: 800 TABLET, FILM COATED ORAL at 08:29

## 2024-04-11 RX ADMIN — HEPARIN SODIUM 5000 UNITS: 5000 INJECTION INTRAVENOUS; SUBCUTANEOUS at 08:29

## 2024-04-11 RX ADMIN — SODIUM CHLORIDE, PRESERVATIVE FREE 10 ML: 5 INJECTION INTRAVENOUS at 21:55

## 2024-04-11 RX ADMIN — SEVELAMER CARBONATE 800 MG: 800 TABLET, FILM COATED ORAL at 17:04

## 2024-04-11 RX ADMIN — Medication 6 MG: at 21:55

## 2024-04-11 RX ADMIN — ACETAMINOPHEN 650 MG: 325 TABLET ORAL at 21:55

## 2024-04-11 RX ADMIN — DOCUSATE SODIUM 100 MG: 100 CAPSULE, LIQUID FILLED ORAL at 08:29

## 2024-04-11 RX ADMIN — SODIUM CHLORIDE, PRESERVATIVE FREE 10 ML: 5 INJECTION INTRAVENOUS at 08:29

## 2024-04-11 RX ADMIN — BUPROPION HYDROCHLORIDE 300 MG: 300 TABLET, FILM COATED, EXTENDED RELEASE ORAL at 08:29

## 2024-04-11 RX ADMIN — HEPARIN SODIUM 5000 UNITS: 5000 INJECTION INTRAVENOUS; SUBCUTANEOUS at 21:54

## 2024-04-11 ASSESSMENT — PAIN SCALES - GENERAL: PAINLEVEL_OUTOF10: 3

## 2024-04-11 ASSESSMENT — PAIN DESCRIPTION - LOCATION: LOCATION: ABDOMEN

## 2024-04-11 ASSESSMENT — PAIN DESCRIPTION - DESCRIPTORS: DESCRIPTORS: ACHING

## 2024-04-11 ASSESSMENT — PAIN DESCRIPTION - ORIENTATION: ORIENTATION: MID

## 2024-04-11 NOTE — PLAN OF CARE
Problem: Discharge Planning  Goal: Discharge to home or other facility with appropriate resources  Outcome: Progressing  Flowsheets (Taken 4/11/2024 0019)  Discharge to home or other facility with appropriate resources:   Identify barriers to discharge with patient and caregiver   Arrange for needed discharge resources and transportation as appropriate     Problem: Safety - Adult  Goal: Free from fall injury  Outcome: Progressing  Flowsheets (Taken 4/11/2024 0019)  Free From Fall Injury: Instruct family/caregiver on patient safety     Problem: Skin/Tissue Integrity  Goal: Absence of new skin breakdown  Description: 1.  Monitor for areas of redness and/or skin breakdown  2.  Assess vascular access sites hourly  3.  Every 4-6 hours minimum:  Change oxygen saturation probe site  4.  Every 4-6 hours:  If on nasal continuous positive airway pressure, respiratory therapy assess nares and determine need for appliance change or resting period.  Outcome: Progressing  Note: No new skin breakdown noted.  Encouraged patient to reposition in bed.        Problem: ABCDS Injury Assessment  Goal: Absence of physical injury  Outcome: Progressing  Flowsheets (Taken 4/11/2024 0019)  Absence of Physical Injury: Implement safety measures based on patient assessment

## 2024-04-11 NOTE — CARE COORDINATION
Case Management Assessment Initial Evaluation    Date/Time of Evaluation: 2024 11:59 AM  Assessment Completed by: Yessi Hoff RN    If patient is discharged prior to next notation, then this note serves as note for discharge by case management.    Patient Name: Deloris Watts                   YOB: 1955  Diagnosis: Toxic metabolic encephalopathy [G92.8]  Encephalopathy due to COVID-19 virus [U07.1, G93.49]                   Date / Time: 4/10/2024 10:22 AM  Location: [facility]     Patient Admission Status: Inpatient   Readmission Risk Low 0-14, Mod 15-19), High > 20: Readmission Risk Score: 31    Current PCP: Johana Weldon MD    Additional Case Management Notes: To ED for AMS and hallucinations. Hospitalist, Nephrology and Psychiatry following. Dietitian. PT/OT/SLP evals. Telemetry. Dialysis MWF. (+) Covid. Oriented x3- disoriented to situation. Creat 4.0    Procedures: none     Imagin/10 CXR: No acute findings  4/10 CT Head WO: No acute findings     Patient Goals/Plan/Treatment Preferences: Spoke with Deloris's  Tenzin. He is her primary caretaker and assists w/ all ADLS. He provides all transportation. She has needed DME. They recently placed a ramp so Deloris won't have to use stairs. Currently doing outpatient therapies at [facility]. Tenzin declines SNF or HH services, declines needs for DME (he doesn't want a commode as he want's her to walk). Current HD at [facility] on MWF 3pm. Plan is for Deloris to return home w/ Tenzin and current DME/Services. He may eventually consider LTC, but right now plans to bring Deloris home.    24 1155   Service Assessment   Patient Orientation Alert and Oriented;Person;Place   Cognition Alert   History Provided By Spouse   Primary Caregiver Spouse   Accompanied By/Relationship none; Call to  (POA) Tenzin   Support Systems Spouse/Significant Other;Family Members   Patient's Healthcare Decision Maker is: Named in Scanned ACP

## 2024-04-11 NOTE — PROGRESS NOTES
[facility]   Nephrology progress note  4/11/2024, 10:26 AM      Pt Name:    Deloris Watts  MRN:     [id number]     YOB: 1955  Admit Date:    4/10/2024 10:22 AM    Chief Complaint: Nephrology following for ESRD on hemodialysis.    Subjective:  Patient seen and examined  No chest pain or shortness of breath  Feels okay    Objective:  24HR INTAKE/OUTPUT:    Intake/Output Summary (Last 24 hours) at 4/11/2024 1026  Last data filed at 4/10/2024 1814  Gross per 24 hour   Intake 400 ml   Output 1900 ml   Net -1500 ml      Admission weight: 38.6 kg (85 lb)  Wt Readings from Last 3 Encounters:   04/10/24 45.7 kg (100 lb 12 oz)   03/30/24 39 kg (85 lb 15.7 oz)   03/13/24 42.7 kg (94 lb 2.2 oz)        Vitals :   Vitals:    04/10/24 2045 04/10/24 2310 04/11/24 0321 04/11/24 0815   BP: 110/65 115/60 130/65 (!) 119/53   Pulse: 85 79 72 78   Resp: 17 16 16 16   Temp: 98.4 °F (36.9 °C) 97.3 °F (36.3 °C) 98.1 °F (36.7 °C) 98.4 °F (36.9 °C)   TempSrc: Oral Oral Oral Oral   SpO2: 97% 96% 97% 98%   Weight:       Height:           Physical examination  General Appearance: Cachectic and ill nourished no distress  Mouth/Throat:  Oral mucosa moist  Neck:  Supple, no JVD  Lungs:  Breath sounds: clear  Heart::  S1,S2 heard  Abdomen:  Soft, non - tender  Musculoskeletal:  Edema -no edema noted    Medications:  Infusion:    sodium chloride      dextrose       Meds:    sodium chloride flush  5-40 mL IntraVENous 2 times per day    heparin (porcine)  5,000 Units SubCUTAneous BID    amLODIPine  5 mg Oral Daily    buPROPion  300 mg Oral Daily    clopidogrel  75 mg Oral Daily    docusate sodium  100 mg Oral TID    folic acid  500 mcg Oral Daily    megestrol  40 mg Oral Daily    melatonin  6 mg Oral Nightly    pantoprazole  40 mg Oral QAM AC    senna  2 tablet Oral Nightly    sevelamer  800 mg Oral BID with meals    atorvastatin  10 mg Oral Daily    traZODone  100 mg Oral Nightly       Lab Data :  CBC:   Recent  Labs     04/10/24  1131 04/11/24  0526   WBC 6.0 6.7   HGB 9.5* 9.7*   HCT 29.8* 30.7*    279     CMP:  Recent Labs     04/10/24  1131 04/11/24  0526    140   K 4.4 4.9    100   CO2 22* 28   BUN 51* 24*   CREATININE 7.4* 4.0*   GLUCOSE 50* 83   CALCIUM 9.1 9.0   MG 2.3  --    PHOS 4.6  --      Hepatic:   Recent Labs     04/10/24  1131   LABALBU 3.9   AST 17   ALT 12   BILITOT 0.3   ALKPHOS 133*       Assessment and Plan:  Renal -ESRD on hemodialysis Monday Wednesday Friday  Volume status and electrolytes stable  No acute need for dialysis today we will get her dialyzed in the morning  Electrolytes -within normal limits  Mild metabolic acidosis should be corrected with the dialysis  Metabolic encephalopathy etiology uncertain appears to be better  Anemia of renal dysfunction  COVID-19 infection  Essential hypertension  Meds reviewed and discussed with patient and     To Cuadra MD  [facility]    This report has been created using voice recognition software. It may contain minor errors which are inherent in voice recognition technology

## 2024-04-11 NOTE — PROGRESS NOTES
[facility]  INPATIENT PHYSICAL THERAPY  EVALUATION  [facility] RENAL TELEMETRY 6K - 6K-10/010-A    Time In: 0808  Time Out: 0835  Timed Code Treatment Minutes: 17 Minutes  Minutes: 27          Date: 2024  Patient Name: Deloris Watts,  Gender:  female        MRN: [id number]  : 1955  (68 y.o.)      Referring Practitioner: Maxi Soriano PA-C  Diagnosis: Toxic Metabolic Encephalopathy  Additional Pertinent Hx: From H&P: Deloris Watts is a 68 y.o. female with a history of ESRD on hemodialysis, stroke, anemia and CKD, depression and anxiety, hyperlipidemia, hypertension, gastroesophageal reflux disease, and recent left hip fracture who presented to [facility] with chief complaint of altered mental status and hallucinations.  History is provided by the patient as well as the patient's .  The patient's  reports that the patient has some baseline cognitive impairment.  She will often have difficulty with orientation questions.  She was recently admitted to the hospital for a left hip fracture and underwent a left Misael hip arthroplasty on 3/11/2024.  She was discharged to inpatient rehab and underwent a short stay in rehab following this.  She was subsequently then discharged home.  The patient's  reports that she has not been doing her exercises at home and has not been eating well.  He reports that she has made comments in the past about not wanting to be alive.  More recently, the patient began to have visual hallucinations and also delusions.  She apparently called her neighbor and one of her relatives this morning and was telling them that she was going to die today.  As such, she was brought to the hospital for further evaluation. DX: Toxic metabolic encephalopathy  a. Likely secondary to COVID-19 and hypoglycemia superimposed on baseline cognitive impairment.     Restrictions/Precautions:  Restrictions/Precautions: Up as Tolerated, Fall Risk, Weight Bearing  Left Lower  initially requiring CGA sitting at EOB but able to progress well to SBA with increased time.     Bed Mobility:  Supine to Sit: Minimal Assistance, with HOBE and use of HR  **Pt performing x2 as on first attempt, pt returns to supine and reporting \"I just can't do this. It's too much right now\". Pt educated on importance of and need for participation with PT to continue to progress strength and mobility. Pt needing cuing for sequencing and overall performance.     Transfers:  Sit to Stand: Contact Guard Assistance, with increased time for completion, cues for hand placement, with verbal cues  Stand to Sit:Contact Guard Assistance, with increased time for completion, cues for hand placement, with verbal cues    Ambulation:  Contact Guard Assistance  Distance: 5ft  Surface: Level Tile  Device:Standard Walker  Gait Deviations:  Forward Flexed Posture, Slow Anna, Decreased Weight Shift Bilaterally, and Decreased Terminal Knee Extension  **Pt requiring increased time for ambulation; also needing encouragement to ambulate any distance; pt deferring further mobility.     Exercise:  Patient was guided in 1 set(s) 10 reps of exercise to both lower extremities.  Seated marches (RLE only), Seated heel/toe raises, and Long arc quads.  Exercises were completed for increased independence with functional mobility.    Functional Outcome Measures: Completed  -PAC Inpatient Mobility Raw Score : 18  -PAC Inpatient T-Scale Score : 43.63  Modified Creighton Scale:  Not Applicable    ASSESSMENT:  Activity Tolerance:  Patient tolerance of  treatment: fair. Max encouragement required for all aspects of eval as pt with limited motivation.      Treatment Initiated: Treatment and education initiated within context of evaluation.  Evaluation time included review of current medical information, gathering information related to past medical, social and functional history, completion of standardized testing, formal and informal observation of

## 2024-04-11 NOTE — CONSULTS
Comprehensive Nutrition Assessment    Type and Reason for Visit:  Initial, Consult, Positive Nutrition Screen (Weight Loss/Decreased Appetite/Intake)    Nutrition Recommendations/Plan:   Continue current diet.  Started Magic Cups TID and Nepro daily.  Recommend Renal Multivitamin.   Continue Megace for appetite stimulation - consider increase dose as needed.     Malnutrition Assessment:  Malnutrition Status:  Moderate malnutrition (04/11/24 1409)    Context:  Chronic Illness     Findings of the 6 clinical characteristics of malnutrition:  Energy Intake:  75% or less estimated energy requirements for 1 month or longer  Weight Loss:  Unable to assess (pt ESRD on HD - fluctuates frequently)     Body Fat Loss:   (Mild Body Fat Loss) Orbital, Triceps, Fat Overlying Ribs   Muscle Mass Loss:   (Moderate Muscle Mass Loss) Temples (temporalis), Clavicles (pectoralis & deltoids)  Fluid Accumulation:  Unable to assess (pt ESRD on HD - fluctuates frequently)     Strength:  Not Performed    Nutrition Assessment: Pt. moderately malnourished AEB criteria as listed above. At risk for further nutrition compromise r/t admit d/t altered mental status with metabolic encephalopathy, hypoglycemia, COVID-19, increased nutrient needs to aid in wound healing, ESRD on HD and underlying medical condition (Hx: Anemia in CKD, CHF, GERD, Constipation, CVA in 1990's, HLD, HTN).        Nutrition Related Findings:    Pt. Report/Treatments/Miscellaneous: Pt seen with spouse present- pt reports poor intake of meals over the past month d/t poor appetite; spouse reports pt is not a big eater in general. Pt states she liked the Magic Cups TID and Nepro daily. Will start Magic Cups TID and Nepro daily. Pt denies any trouble tolerating diet. Discussed Renal diet with patient and spouse.  GI Status: Last BM x1 on 4/9.  Pertinent Labs: 4/11: BUN 24, Cr. 4, Glucose 83  Pertinent Meds: Colace, Folic Acid, Megace 40 mg daily, Protonix, Senokot,  Rosalindaa  Wound Type: Surgical Incision (s/p Left Misael Hip Arthroplasty on 3/11/24)       Current Nutrition Intake & Therapies:    Average Meal Intake: Unable to assess (poor intake of meals PTA per spouse report)  Average Supplements Intake:  (Initiated today)  ADULT DIET; Regular; Low Sodium (2 gm); Low Potassium (Less than 3000 mg/day); Low Phosphorus (Less than 1000 mg); 2000 ml  ADULT ORAL NUTRITION SUPPLEMENT; Breakfast, Lunch, Dinner; Frozen Oral Supplement  ADULT ORAL NUTRITION SUPPLEMENT; Dinner; Renal Oral Supplement    Anthropometric Measures:  Height: 152.4 cm (5')  Ideal Body Weight (IBW): 100 lbs (45 kg)    Admission Body Weight: 47.2 kg (104 lb 0.9 oz) (4/10/24; no edema noted)  Current Body Weight: 45.7 kg (100 lb 12 oz) (4/11; no edema noted)  Current BMI (kg/m2): 19.7  Usual Body Weight:  (100 lbs per pt report. Per EMR: 4/28/23: 102# 15 oz, 8/1/23: 102# 10 oz, 12/18/23: 93# 11 oz, 1/5/24: 93# 11 oz; 104 lb 4.4 oz on 3/11/24; 104 lb 6.4 oz on 1/16/24)  BMI Categories: Underweight (BMI less than 22) age over 65    Estimated Daily Nutrient Needs:  Energy Requirements Based On: Kcal/kg  Weight Used for Energy Requirements: Current  Energy (kcal/day): 0100-8689 kcal/day (30-35 kcal/kg)  Weight Used for Protein Requirements: Ideal  Protein (g/day): 54-68 g/day (1.2-1.5 g/kg)  Fluid ml/day: 2000 ml per provider   Nutrition Diagnosis:   Moderate malnutrition, In context of chronic illness related to inadequate protein-energy intake as evidenced by Criteria as identified in malnutrition assessment    Nutrition Interventions:   Food and/or Nutrient Delivery: Continue Current Diet, Start Oral Nutrition Supplement, Vitamin Supplement  Nutrition Education/Counseling: Education initiated (Encouraged po intake of meals and ONS at best effort)  Coordination of Nutrition Care: Continue to monitor while inpatient    Goals:  Goals: PO intake 75% or greater, by next RD assessment    Nutrition Monitoring and

## 2024-04-11 NOTE — CONSULTS
Department of Psychiatry  Consult Service   Psychiatric Assessment      Thank you very much for allowing us to participate in the care of this patient.      Reason for Consult:  Encephalopathy, Patient stating she does not want to be alive.    HISTORY OF PRESENT ILLNESS:          The patient is a 68 y.o. female with significant history of MDD/GABBY who is admitted medically for toxic metabolic encephalopathy likely 2/2 covid-19 and hypoglycemia superimposed on baseline cognitive impairment 2/2 hx of CVA.    Patient states currently hallucinations have resolved, previously had distressing hallucinations of people removing Pt dialysis line. Mood has been depressed due to life stressors of increased medical problems leading to feeling of lack of over reliance of spouse. Pt is eatting less, since last Discharge on IPR ws placed on megace. Noted passive SI, but no active plan, mood is depressed per Pt. Unsure if wellbutrin has been helping.    PSYCHIATRIC HISTORY:      Outpatient psychiatric provider:  PCP  Suicide attempts: Once overdose 2-3 years prior.  Inpatient psychiatric admissions: None per Pt.    Past psychiatric medications includes:     Bupropion 300 QD - several years    Remotely  Atarax 25 TID PRN  Depakote  QD  Gabapentin 300 BID    Adverse reactions from psychotropic medications:    None per Pt.      Lifetime Psychiatric Review of Systems         Obsessions and Compulsions: Denies       Mohini or Hypomania: Denies     Hallucinations: As above, acutely otherwise Denies     Panic Attacks:  Denies     Delusions:  Denies     Phobias:  Denies     Trauma: Denies    Prior to Admission medications    Medication Sig Start Date End Date Taking? Authorizing Provider   polyethylene glycol (GLYCOLAX) 17 g packet Take 1 packet by mouth daily as needed for Constipation 4/8/24   Johana Weldon MD   buPROPion (WELLBUTRIN XL) 300 MG extended release tablet Take 1 tablet by mouth daily 3/30/24   Jax Whatley,

## 2024-04-11 NOTE — PLAN OF CARE
Problem: Discharge Planning  Goal: Discharge to home or other facility with appropriate resources  4/11/2024 1314 by Jane Fu RN  Outcome: Progressing  Flowsheets (Taken 4/11/2024 0019 by Reta Salas RN)  Discharge to home or other facility with appropriate resources:   Identify barriers to discharge with patient and caregiver   Arrange for needed discharge resources and transportation as appropriate  4/11/2024 0019 by Reta Salas RN  Outcome: Progressing  Flowsheets (Taken 4/11/2024 0019)  Discharge to home or other facility with appropriate resources:   Identify barriers to discharge with patient and caregiver   Arrange for needed discharge resources and transportation as appropriate     Problem: Safety - Adult  Goal: Free from fall injury  4/11/2024 1314 by Jane Fu RN  Outcome: Progressing  Flowsheets (Taken 4/11/2024 0019 by Reta Salas RN)  Free From Fall Injury: Instruct family/caregiver on patient safety  4/11/2024 0019 by Reta Salas RN  Outcome: Progressing  Flowsheets (Taken 4/11/2024 0019)  Free From Fall Injury: Instruct family/caregiver on patient safety     Problem: Skin/Tissue Integrity  Goal: Absence of new skin breakdown  Description: 1.  Monitor for areas of redness and/or skin breakdown  2.  Assess vascular access sites hourly  3.  Every 4-6 hours minimum:  Change oxygen saturation probe site  4.  Every 4-6 hours:  If on nasal continuous positive airway pressure, respiratory therapy assess nares and determine need for appliance change or resting period.  4/11/2024 1314 by Jane Fu RN  Outcome: Progressing  4/11/2024 0019 by Reta Salas RN  Outcome: Progressing  Note: No new skin breakdown noted.  Encouraged patient to reposition in bed.        Problem: ABCDS Injury Assessment  Goal: Absence of physical injury  4/11/2024 1314 by Jane Fu RN  Outcome: Progressing  Flowsheets (Taken 4/11/2024 0019 by Reta Salas RN)  Absence of Physical Injury:

## 2024-04-11 NOTE — PROGRESS NOTES
Hospitalist Progress Note    Patient:  Deloris Watts      Unit/Bed:6K-10/010-A    YOB: 1955    MRN: [id number]       Acct: [id number]     PCP: Johana Weldon MD    Date of Admission: 4/10/2024    Assessment/Plan:    Toxic metabolic encephalopathy: Likely multifactorial, 2/2 Hypoglycemia, Uremia 2/2 missed dialysis session, and possibly Covid 19 infection  Patient's  reported she was having hallucinations, delusions, and stating that she does not want to be alive anymore.  Psychiatry consulted, medications adjusted  SLP cog eval  Avoid sedating medications.    Hypoglycemia:  Glucose noted to be 60 initially. Suspect secondary to poor oral intake.  Hypoglycemia protocol in place.  Dietitian consulted.  Continue megestrol.    COVID-19: Mild  Not requiring supplemental oxygen  Continue to monitor for hypoxia    ESRD on hemodialysis MWF:  Nephrology consulted  Continue sevelamer    Constipation:  Continue home bowel regimen.    Gastroesophageal reflux disease:  Continue Protonix    Hypertension:  Continue home regimen    Anemia in CKD:  Hemoglobin 9.5, around baseline  Monitor for worsening anemia    Unspecified depression, anxiety:  Psychiatry consulted, medications adjusted    History of ischemic stroke:  Continue Plavix    Hyperlipidemia:  Continue simvastatin    History of left hip fracture:  S/p left raheel hip arthroplasty on 3/11/2024.  PT/OT consult.    Chief Complaint:       Subjective: 68 y.o. female admitted to the hospitalist service with altered mental status. Patient is oriented x 2 today. She denies chest pain. She denies SOB.    Medications:    Infusion Medications    sodium chloride      dextrose       Scheduled Medications    sodium chloride flush  5-40 mL IntraVENous 2 times per day    heparin (porcine)  5,000 Units SubCUTAneous BID    amLODIPine  5 mg Oral Daily    buPROPion  300 mg Oral Daily    clopidogrel  75 mg Oral Daily    docusate sodium  100 mg Oral TID     03:40 AM    BLOODU NEGATIVE 04/11/2024 03:40 AM    SPECGRAV 1.020 04/04/2024 02:29 PM    SPECGRAV 1.015 02/10/2024 01:04 PM    GLUCOSEU NEGATIVE 04/11/2024 03:40 AM       Radiology:  CT HEAD WO CONTRAST   Final Result       1. Stable CT scan of the brain, no interval change since previous study dated 3/11/2024..               **This report has been created using voice recognition software. It may contain minor errors which are inherent in voice recognition technology.**      Final report electronically signed by DR GEETA RIVERA on 4/10/2024 12:07 PM      XR CHEST PORTABLE   Final Result   1. Lungs are very well inflated. Cardiac silhouette small in size. Kyphotic positioning of the patient. Prior anterior cervical fusion. Right jugular dialysis catheter, tip in right atrium. Prior surgery proximal right humerus.   2. No acute findings. No infiltrates or effusions are seen.            **This report has been created using voice recognition software.  It may contain minor errors which are inherent in voice recognition technology.**      Final report electronically signed by Dr. Tank Newman on 4/10/2024 11:32 AM          Diet: ADULT DIET; Regular; Low Sodium (2 gm); Low Potassium (Less than 3000 mg/day); Low Phosphorus (Less than 1000 mg); 2000 ml    DVT prophylaxis: [] Lovenox                                 [] SCDs                                 [x] SQ Heparin                                 [] Encourage ambulation           [] Already on Anticoagulation     Disposition:    [x] Home       [] TCU       [] Rehab       [] Psych       [] SNF       [] Long Term Care Facility       [] Other-    Code Status: Full Code      Electronically signed by Jesus Alberto Roberts PA-C on 4/11/2024 at 9:37 AM

## 2024-04-11 NOTE — CARE COORDINATION
04/11/24 1153   Readmission Assessment   Number of Days since last admission? 8-30 days   Previous Disposition Acute Rehab   Who is being Interviewed Caregiver  ( Tenzin)   What was the patient's/caregiver's perception as to why they think they needed to return back to the hospital? Other (Comment)  (Hallucinations; Confusion; Lack of Motivation)   Did you visit your Primary Care Physician after you left the hospital, before you returned this time? Yes   Did you see a specialist, such as Cardiac, Pulmonary, Orthopedic Physician, etc. after you left the hospital? No   Who advised the patient to return to the hospital? Self-referral  ( Tenzin)   Does the patient report anything that got in the way of taking their medications? No  (Tenzin administers all medications)   In our efforts to provide the best possible care to you and others like you, can you think of anything that we could have done to help you after you left the hospital the first time, so that you might not have needed to return so soon? Other (Comment)  (No. Tenzin states that Deloris will put on a show at the hospital and IPR (listening to therapy, nurses and doctors) however when she gets home she is very unmotivated and doesn't want to walk. Doesn't want SNF or HH)

## 2024-04-11 NOTE — PROGRESS NOTES
completion. Pt requires cues to take step to left side to avoid sitting and missing toilet seat. Impaired spatial awareness .    BALANCE:  Sitting Balance:  Stand By Assistance. Seated on toilet. Pt requires cues to adjust self on toilet to avoid falling of right side of toilet  Standing Balance: Contact Guard Assistance, X 1, with cues for safety, with verbal cues .      BED MOBILITY:  Sit to Supine: Minimal Assistance, X 1, with head of bed raised, with rail, with verbal cues       TRANSFERS:  Sit to Stand:  Contact Guard Assistance, X 1, with increased time for completion, cues for hand placement.    Stand to Sit: Contact Guard Assistance, X 1, with increased time for completion, cues for hand placement, with verbal cues.      FUNCTIONAL MOBILITY:  Assistive Device: Walker  Assist Level:  Contact Guard Assistance, X 1, with set-up, with verbal cues , and with increased time for completion.   Distance: To and from bathroom  Cues to manage standard walker required. PT has RW and rollator at home. Therapist retrieves RW for pt to use at end of session. Education provided to nurse to encourage pt to ambulate to the bathroom with staff using walker.        Activity Tolerance:  Patient tolerance of  treatment: Fair treatment tolerance      Assessment:  Assessment: Deloris Watts is a 68 y.o.female that presents with above new performance deficits secondary to toxic metabolic encephalopathy. Pt is requiring increased assistance for ADLs, functional mobility, ADL transfers compared to baseline level of function. Skilled OT services is warranted to improve above performance deficits and progress pt towards PLOF. Without OT pt is at risk for falls, further decline in functional abilities, increased caregiver burden, increased risk for medical complication as a result of reduce mobility and inability to return to prior level of living.      Performance deficits / Impairments: Decreased functional mobility , Decreased ADL  access.  Short Term Goal 2: Pt will tolerate x5 min standing with 1-2 UE release at SBA to improve balance and activity tolerance required for sinkside ADLS  Short Term Goal 3: Pt will complete toileting routine with SBA and no additional cues for problem solving or safety to improve indep with ADL routines.  Short Term Goal 4: Pt will complete UB/LB dressing/athing with SBA and minimal cues for safety to improve indep with ADL routines.         Following session, patient left in safe position with all fall risk precautions in place.

## 2024-04-12 VITALS
DIASTOLIC BLOOD PRESSURE: 43 MMHG | WEIGHT: 95.46 LBS | OXYGEN SATURATION: 100 % | HEART RATE: 95 BPM | SYSTOLIC BLOOD PRESSURE: 153 MMHG | TEMPERATURE: 96.9 F | RESPIRATION RATE: 16 BRPM | HEIGHT: 60 IN | BODY MASS INDEX: 18.74 KG/M2

## 2024-04-12 LAB
ANION GAP SERPL CALC-SCNC: 15 MEQ/L (ref 8–16)
BUN SERPL-MCNC: 42 MG/DL (ref 7–22)
CALCIUM SERPL-MCNC: 8.8 MG/DL (ref 8.5–10.5)
CHLORIDE SERPL-SCNC: 100 MEQ/L (ref 98–111)
CO2 SERPL-SCNC: 25 MEQ/L (ref 23–33)
CREAT SERPL-MCNC: 6.1 MG/DL (ref 0.4–1.2)
GFR SERPL CREATININE-BSD FRML MDRD: 7 ML/MIN/1.73M2
GLUCOSE BLD STRIP.AUTO-MCNC: 91 MG/DL (ref 70–108)
GLUCOSE SERPL-MCNC: 131 MG/DL (ref 70–108)
POTASSIUM SERPL-SCNC: 5.6 MEQ/L (ref 3.5–5.2)
SODIUM SERPL-SCNC: 140 MEQ/L (ref 135–145)

## 2024-04-12 PROCEDURE — 36415 COLL VENOUS BLD VENIPUNCTURE: CPT

## 2024-04-12 PROCEDURE — 6370000000 HC RX 637 (ALT 250 FOR IP): Performed by: PHYSICIAN ASSISTANT

## 2024-04-12 PROCEDURE — 99238 HOSP IP/OBS DSCHRG MGMT 30/<: CPT | Performed by: PHYSICIAN ASSISTANT

## 2024-04-12 PROCEDURE — 99232 SBSQ HOSP IP/OBS MODERATE 35: CPT | Performed by: INTERNAL MEDICINE

## 2024-04-12 PROCEDURE — 2580000003 HC RX 258: Performed by: PHYSICIAN ASSISTANT

## 2024-04-12 PROCEDURE — 80048 BASIC METABOLIC PNL TOTAL CA: CPT

## 2024-04-12 PROCEDURE — 82948 REAGENT STRIP/BLOOD GLUCOSE: CPT

## 2024-04-12 PROCEDURE — 6360000002 HC RX W HCPCS: Performed by: PHYSICIAN ASSISTANT

## 2024-04-12 PROCEDURE — 90935 HEMODIALYSIS ONE EVALUATION: CPT

## 2024-04-12 RX ADMIN — PANTOPRAZOLE SODIUM 40 MG: 40 TABLET, DELAYED RELEASE ORAL at 05:45

## 2024-04-12 RX ADMIN — AMLODIPINE BESYLATE 5 MG: 5 TABLET ORAL at 08:12

## 2024-04-12 RX ADMIN — HEPARIN SODIUM 5000 UNITS: 5000 INJECTION INTRAVENOUS; SUBCUTANEOUS at 08:12

## 2024-04-12 RX ADMIN — ACETAMINOPHEN 650 MG: 325 TABLET ORAL at 10:39

## 2024-04-12 RX ADMIN — SEVELAMER CARBONATE 800 MG: 800 TABLET, FILM COATED ORAL at 08:12

## 2024-04-12 RX ADMIN — BUPROPION HYDROCHLORIDE 300 MG: 300 TABLET, FILM COATED, EXTENDED RELEASE ORAL at 08:15

## 2024-04-12 RX ADMIN — SODIUM CHLORIDE, PRESERVATIVE FREE 10 ML: 5 INJECTION INTRAVENOUS at 08:14

## 2024-04-12 RX ADMIN — CLOPIDOGREL BISULFATE 75 MG: 75 TABLET ORAL at 08:13

## 2024-04-12 RX ADMIN — MEGESTROL ACETATE 40 MG: 40 TABLET ORAL at 08:15

## 2024-04-12 RX ADMIN — FOLIC ACID 500 MCG: 1 TABLET ORAL at 08:12

## 2024-04-12 ASSESSMENT — PAIN DESCRIPTION - LOCATION: LOCATION: ABDOMEN

## 2024-04-12 ASSESSMENT — PAIN SCALES - GENERAL: PAINLEVEL_OUTOF10: 4

## 2024-04-12 ASSESSMENT — PAIN DESCRIPTION - ORIENTATION: ORIENTATION: LOWER

## 2024-04-12 ASSESSMENT — PAIN DESCRIPTION - DESCRIPTORS: DESCRIPTORS: ACHING;SORE

## 2024-04-12 NOTE — PROGRESS NOTES
Prayer and encouragement   04/12/24 1656   Encounter Summary   Encounter Overview/Reason  Initial Encounter   Service Provided For: Patient   [facility]   Support System Family members   Last Encounter  04/12/24   Complexity of Encounter Low   Begin Time 1440   End Time  1445   Total Time Calculated 5 min   Spiritual/Emotional needs   Type Spiritual Support   Assessment/Intervention/Outcome   Assessment Hopeful   Intervention Empowerment         DISPLAY PLAN FREE TEXT

## 2024-04-12 NOTE — PLAN OF CARE
Problem: Discharge Planning  Goal: Discharge to home or other facility with appropriate resources  4/12/2024 1206 by Anastasiya Barfield RN  Outcome: Completed  4/12/2024 0215 by Azra Fonseca RN  Outcome: Progressing  Flowsheets (Taken 4/11/2024 2145)  Discharge to home or other facility with appropriate resources: Identify barriers to discharge with patient and caregiver     Problem: Safety - Adult  Goal: Free from fall injury  4/12/2024 1206 by Anastasiya Barfield RN  Outcome: Completed  4/12/2024 0215 by Azra Fonseca RN  Outcome: Progressing  Note: Call light in reach, bed in lowest position, and bed alarm activated.  Education given on use of call light before ambulation and when in need of assistance.  Patient expressed understanding.  Hourly visual checks performed and charted.  Toileting offered to patient.  No falls this shift, at any time.  Arm band and falling star in place.  Will continue to monitor.      Problem: Skin/Tissue Integrity  Goal: Absence of new skin breakdown  Description: 1.  Monitor for areas of redness and/or skin breakdown  2.  Assess vascular access sites hourly  3.  Every 4-6 hours minimum:  Change oxygen saturation probe site  4.  Every 4-6 hours:  If on nasal continuous positive airway pressure, respiratory therapy assess nares and determine need for appliance change or resting period.  4/12/2024 1206 by Anastasiya Barfield RN  Outcome: Completed  4/12/2024 0215 by Azra Fonseca RN  Outcome: Progressing  Note: Patient free from skin breakdown. Patient turns self and makes frequent positional changes. Will continue to monitor.      Problem: ABCDS Injury Assessment  Goal: Absence of physical injury  Outcome: Completed     Problem: Chronic Conditions and Co-morbidities  Goal: Patient's chronic conditions and co-morbidity symptoms are monitored and maintained or improved  4/12/2024 1206 by Anastasiya Barfield RN  Outcome: Completed  4/12/2024 0215 by Azra Fonseca RN  Outcome: Progressing  Flowsheets

## 2024-04-12 NOTE — PROGRESS NOTES
[facility]   Nephrology progress note  4/12/2024, 10:16 AM      Pt Name:    Deloris Watts  MRN:     [id number]     YOB: 1955  Admit Date:    4/10/2024 10:22 AM    Chief Complaint: Nephrology following for ESRD on hemodialysis.    Subjective:  Patient seen and examined  No chest pain or shortness of breath  Feels okay no more hallucinations    Objective:  24HR INTAKE/OUTPUT:    Intake/Output Summary (Last 24 hours) at 4/12/2024 1016  Last data filed at 4/12/2024 0310  Gross per 24 hour   Intake 400 ml   Output --   Net 400 ml        Admission weight: 38.6 kg (85 lb)  Wt Readings from Last 3 Encounters:   04/12/24 47.9 kg (105 lb 9.6 oz)   03/30/24 39 kg (85 lb 15.7 oz)   03/13/24 42.7 kg (94 lb 2.2 oz)        Vitals :   Vitals:    04/12/24 0030 04/12/24 0310 04/12/24 0315 04/12/24 0812   BP: 113/64  109/60 (!) 135/57   Pulse: 81  88 92   Resp: 18  18 16   Temp: 97.9 °F (36.6 °C)  98.8 °F (37.1 °C) 98 °F (36.7 °C)   TempSrc: Oral  Oral Oral   SpO2: 97%  98% 98%   Weight:  47.9 kg (105 lb 9.6 oz)     Height:  1.524 m (5')         Physical examination  General Appearance: Cachectic and ill nourished no distress  Mouth/Throat:  Oral mucosa moist  Neck:  Supple, no JVD  Lungs:  Breath sounds: clear  Heart::  S1,S2 heard  Abdomen:  Soft, non - tender  Musculoskeletal:  Edema -no edema noted    Medications:  Infusion:    sodium chloride      dextrose       Meds:    sodium chloride flush  5-40 mL IntraVENous 2 times per day    heparin (porcine)  5,000 Units SubCUTAneous BID    amLODIPine  5 mg Oral Daily    buPROPion  300 mg Oral Daily    clopidogrel  75 mg Oral Daily    docusate sodium  100 mg Oral TID    folic acid  500 mcg Oral Daily    megestrol  40 mg Oral Daily    melatonin  6 mg Oral Nightly    pantoprazole  40 mg Oral QAM AC    senna  2 tablet Oral Nightly    sevelamer  800 mg Oral BID with meals    atorvastatin  10 mg Oral Daily    traZODone  100 mg Oral Nightly       Lab Data  :  CBC:   Recent Labs     04/10/24  1131 04/11/24  0526   WBC 6.0 6.7   HGB 9.5* 9.7*   HCT 29.8* 30.7*    279       CMP:  Recent Labs     04/10/24  1131 04/11/24  0526 04/12/24  0914    140 140   K 4.4 4.9 5.6*    100 100   CO2 22* 28 25   BUN 51* 24* 42*   CREATININE 7.4* 4.0* 6.1*   GLUCOSE 50* 83 131*   CALCIUM 9.1 9.0 8.8   MG 2.3  --   --    PHOS 4.6  --   --        Hepatic:   Recent Labs     04/10/24  1131   LABALBU 3.9   AST 17   ALT 12   BILITOT 0.3   ALKPHOS 133*         Assessment and Plan:  Renal -ESRD on hemodialysis Monday Wednesday Friday  Volume status and electrolytes stable potassium slightly higher will be corrected with the dialysis  Will get her dialyzed later today  Electrolytes -hyperkalemia HD on a 2K bath  Mild metabolic acidosis much better  Metabolic encephalopathy etiology uncertain appears to be better  Anemia of renal dysfunction  COVID-19 infection  Essential hypertension  Meds reviewed and discussed with patient    To Cuadra MD  [facility]    This report has been created using voice recognition software. It may contain minor errors which are inherent in voice recognition technology

## 2024-04-12 NOTE — CARE COORDINATION
4/12/24, 1:11 PM EDT    Patient goals/plan/ treatment preferences discussed by  and .  Patient goals/plan/ treatment preferences reviewed with patient/ family.  Patient/ family verbalize understanding of discharge plan and are in agreement with goal/plan/treatment preferences.  Understanding was demonstrated using the teach back method.  AVS provided by RN at time of discharge, which includes all necessary medical information pertaining to the patients current course of illness, treatment, post-discharge goals of care, and treatment preferences.     Services At/After Discharge: Outpatient       IMM Letter  IMM Letter date given:: 04/12/24  IMM Letter time given:: 1941        Spoke w/ patient's  Tenzin. He is agreeable with discharge today. Plan for Deloris to return home w/  Tenzin. Has needed DME. OP therapies at [facility].     Current HD at [facility] on MWF 3pm

## 2024-04-12 NOTE — PROGRESS NOTES
Physician Progress Note      PATIENT:               HANS HEWITT  CSN #:                  [id number]  :                       1955  ADMIT DATE:       4/10/2024 10:22 AM  DISCH DATE:        2024 4:02 PM  RESPONDING  PROVIDER #:        Jesus Alberto Roberts PA-C          QUERY TEXT:    Patient admitted with toxic metabolic encephalopathy. Noted to have poor   appetite/po intake, weight loss and dietician assessment with moderate   malnutrition diagnosis. If possible, please document in progress notes and   discharge summary if you are evaluating and /or treating any of the following:    The medical record reflects the following:    Risk Factors: poor po intake/appetite, weight loss  Clinical Indicators: moderate malnutrition per dietician per AND/ASPEN   guidelines as evidenced by Energy Intake:  75% or less estimated energy   requirements for 1 month or longer, (Mild Body Fat Loss) Orbital, Triceps, Fat   Overlying Ribs,  (Moderate Muscle Mass Loss) Temples (temporalis), Clavicles   (pectoralis & deltoids)  Treatment: Dietician consult, Magic Cups TID & Nepro daily    ASPEN Criteria:    https://aspenjournals.onlinelibrary.robbins.com/doi/full/10.1177/[email or web address]  5    Thank you!    Aki Dahl, JAQUANN,RN, CRCR  RN Clinical   Options provided:  -- Moderate protein calorie malnutrition  -- Other - I will add my own diagnosis  -- Disagree - Not applicable / Not valid  -- Disagree - Clinically unable to determine / Unknown  -- Refer to Clinical Documentation Reviewer    PROVIDER RESPONSE TEXT:    This patient has moderate protein calorie malnutrition.    Query created by: Aki Dahl on 2024 3:24 PM      Electronically signed by:  Jesus Alberto Roberts PA-C 2024 5:14 PM

## 2024-04-12 NOTE — PLAN OF CARE
Problem: Discharge Planning  Goal: Discharge to home or other facility with appropriate resources  4/12/2024 0215 by Azra Fonseca, RN  Outcome: Progressing  Flowsheets (Taken 4/11/2024 2145)  Discharge to home or other facility with appropriate resources: Identify barriers to discharge with patient and caregiver     Problem: Safety - Adult  Goal: Free from fall injury  4/12/2024 0215 by Azra Fonseca, RN  Outcome: Progressing  Note: Call light in reach, bed in lowest position, and bed alarm activated.  Education given on use of call light before ambulation and when in need of assistance.  Patient expressed understanding.  Hourly visual checks performed and charted.  Toileting offered to patient.  No falls this shift, at any time.  Arm band and falling star in place.  Will continue to monitor.      Problem: Skin/Tissue Integrity  Goal: Absence of new skin breakdown  Description: 1.  Monitor for areas of redness and/or skin breakdown  2.  Assess vascular access sites hourly  3.  Every 4-6 hours minimum:  Change oxygen saturation probe site  4.  Every 4-6 hours:  If on nasal continuous positive airway pressure, respiratory therapy assess nares and determine need for appliance change or resting period.  4/12/2024 0215 by Azra Fonseca, RN  Outcome: Progressing  Note: Patient free from skin breakdown. Patient turns self and makes frequent positional changes. Will continue to monitor.      Problem: Chronic Conditions and Co-morbidities  Goal: Patient's chronic conditions and co-morbidity symptoms are monitored and maintained or improved  4/12/2024 0215 by Azra Fonseca, RN  Outcome: Progressing  Flowsheets (Taken 4/11/2024 2145)  Care Plan - Patient's Chronic Conditions and Co-Morbidity Symptoms are Monitored and Maintained or Improved: Monitor and assess patient's chronic conditions and comorbid symptoms for stability, deterioration, or improvement     Problem: Pain  Goal: Verbalizes/displays adequate comfort  level or baseline comfort level  4/12/2024 0215 by Azra Fonseca, RN  Outcome: Progressing  Flowsheets (Taken 4/11/2024 1445 by Jane Fu, RN)  Verbalizes/displays adequate comfort level or baseline comfort level: Encourage patient to monitor pain and request assistance     Problem: Nutrition Deficit:  Goal: Optimize nutritional status  Outcome: Progressing  Flowsheets (Taken 4/12/2024 0215)  Nutrient intake appropriate for improving, restoring, or maintaining nutritional needs:   Assess nutritional status and recommend course of action   Monitor oral intake, labs, and treatment plans

## 2024-04-12 NOTE — PROGRESS NOTES
Discharge teaching and instructions for diagnosis of toxic metabolic encephalopathy completed with patient and  using teachback method. Patient voiced understanding regarding prescriptions, follow up appointments, and care of self at home. Discharged in a wheelchair family to  home with support per family.  AVS reviewed.   All questions answered and belongings sent with the patient.

## 2024-04-12 NOTE — DISCHARGE INSTR - DIET

## 2024-04-12 NOTE — FLOWSHEET NOTE
04/12/24 1410   Vital Signs   BP (!) 153/43   Temp 96.9 °F (36.1 °C)   Pulse 95   Respirations 16   Weight - Scale 43.3 kg (95 lb 7.4 oz)   Weight Method Bed scale   Percent Weight Change -1.14   Post-Hemodialysis Assessment   Blood Volume Processed (Liters) 58.3 L   Dialyzer Clearance Lightly streaked   Duration of Treatment (minutes) 150 minutes   Heparin Amount Administered During Treatment (mL) 0 mL   Hemodialysis Intake (ml) 400 ml   Hemodialysis Output (ml) 900 ml   NET Removed (ml) 500   Tolerated Treatment Good     2.5 hour treatment completed. 500 ml of fluid removed. Cath lines flushed with 10 ml of 0.9 NS, clamped and tego secured. Report given to primary RN. CHarting printed and placed in bin to be scanned into EMR.

## 2024-04-15 ENCOUNTER — TELEPHONE (OUTPATIENT)
Dept: FAMILY MEDICINE CLINIC | Age: 69
End: 2024-04-15

## 2024-04-15 ENCOUNTER — COMMUNITY CARE MANAGEMENT (OUTPATIENT)
Facility: CLINIC | Age: 69
End: 2024-04-15

## 2024-04-15 NOTE — TELEPHONE ENCOUNTER
Care Transitions Initial Follow Up Call    Outreach made within 2 business days of discharge: Yes    Patient: Ethan Watts Patient : 1955   MRN: [id number]  Reason for Admission: There are no discharge diagnoses documented for the most recent discharge.  Discharge Date: 24       Spoke with: ethan    Discharge department/facility: [facility]    TCM Interactive Patient Contact:  Was patient able to fill all prescriptions: Yes  Was patient instructed to bring all medications to the follow-up visit: Yes  Is patient taking all medications as directed in the discharge summary? Yes  Does patient understand their discharge instructions: Yes  Does patient have questions or concerns that need addressed prior to 7 day follow up office visit: no    Scheduled appointment with PCP within 7 days    Follow Up  Future Appointments   Date Time Provider Department Center   2024 11:00 AM Johana Weldon MD [facility] [place]   2024  8:30 AM Joya Rosario APRN - CNP [place] [facility]   2024  3:00 PM Reina Mendoza, PhD N [facility] [place]   2024  1:00 PM Johana Weldon MD [facility] [place]   10/29/2024  1:00 PM Ulises Adrian MD N [facility]       Mahi Chau LPN

## 2024-04-15 NOTE — PROGRESS NOTES
XR ABDOMEN (KUB) (SINGLE AP VIEW); Future      Medical Decision Making: high complexity    Return if symptoms worsen or fail to improve.           Subjective:     Deloris Watts is a 69 yo female who presents today with her  Tenzin for transition of care.  She was hospitalized at [facility] from 4/10/24-4/12/24.  Hospital records, labs, and imaging were reviewed and are summarized below.  On 4/8 and 4/10 Deloris missed dialysis and was having hallucinations so Tenzin took her to the ER.  Her CT brain showed no change from prior.  She did test positive for COVID-19 which was thought to be the cause of her encephalopathy.  Because of this and her uremia, she was admitted and was given inpatient dialysis which improved her electrolytes.  By 4/12 her mentation had improved enough to be discharged home.      Since being home she's been more weak than usual.  Her  reports that for the past 2-3 weeks she hasn't been able to hold her urine.  She'll get the urge to urinate then stand up and it flows.  She has dysuria but she had UAs on 4/4 and 4/11 which were negative for infection.  She has an appt with urology on 5/13/24.  Her last BM was 5 days ago.  She tried a suppository last night but it came out 10 minutes later.  She took Linzess 3 days in a row with no relief.  She hasn't had Miralax for the past week.    Patient Active Problem List   Diagnosis    Cerebral infarction (HCC)    Hx of Chest pain, atypical- tenderness on the left lower chest wall    Anxiety disorder    History of CVA (cerebrovascular accident)    Hyperlipidemia    Irritable bowel syndrome    Abdominal adhesions    Allergic rhinitis    Essential hypertension    Generalized anxiety disorder    Stage 4 chronic kidney disease (HCC)    Age related osteoporosis    Major depression, recurrent (HCC)    Environmental and seasonal allergies    Hearing loss of right ear due to cerumen impaction    Recurrent sinusitis    Abnormal EKG    Postural dizziness    SOB

## 2024-04-16 ENCOUNTER — HOSPITAL ENCOUNTER (OUTPATIENT)
Age: 69
Discharge: HOME | End: 2024-04-16
Payer: MEDICARE

## 2024-04-16 ENCOUNTER — HOSPITAL ENCOUNTER (OUTPATIENT)
Dept: GENERAL RADIOLOGY | Age: 69
Discharge: HOME | End: 2024-04-16
Payer: MEDICARE

## 2024-04-16 ENCOUNTER — OFFICE VISIT (OUTPATIENT)
Dept: FAMILY MEDICINE CLINIC | Age: 69
End: 2024-04-16

## 2024-04-16 VITALS
DIASTOLIC BLOOD PRESSURE: 64 MMHG | BODY MASS INDEX: 19.39 KG/M2 | HEART RATE: 80 BPM | RESPIRATION RATE: 16 BRPM | HEIGHT: 60 IN | WEIGHT: 98.8 LBS | TEMPERATURE: 99.6 F | SYSTOLIC BLOOD PRESSURE: 120 MMHG

## 2024-04-16 DIAGNOSIS — K59.09 CHRONIC CONSTIPATION: ICD-10-CM

## 2024-04-16 DIAGNOSIS — N20.0 KIDNEY STONE: ICD-10-CM

## 2024-04-16 DIAGNOSIS — G93.49 ENCEPHALOPATHY DUE TO COVID-19 VIRUS: ICD-10-CM

## 2024-04-16 DIAGNOSIS — U07.1 ENCEPHALOPATHY DUE TO COVID-19 VIRUS: ICD-10-CM

## 2024-04-16 DIAGNOSIS — Z09 HOSPITAL DISCHARGE FOLLOW-UP: Primary | ICD-10-CM

## 2024-04-16 DIAGNOSIS — N18.6 ESRD (END STAGE RENAL DISEASE) (HCC): ICD-10-CM

## 2024-04-16 DIAGNOSIS — R32 URINARY INCONTINENCE, UNSPECIFIED TYPE: ICD-10-CM

## 2024-04-16 PROCEDURE — 74018 RADEX ABDOMEN 1 VIEW: CPT

## 2024-04-16 RX ORDER — POLYETHYLENE GLYCOL 3350 17 G/17G
17 POWDER, FOR SOLUTION ORAL DAILY
Qty: 765 G | Refills: 1 | Status: SHIPPED | OUTPATIENT
Start: 2024-04-16 | End: 2024-05-16

## 2024-04-16 RX ORDER — TRAMADOL HYDROCHLORIDE 50 MG/1
TABLET ORAL
COMMUNITY
Start: 2024-04-02

## 2024-04-17 LAB
EKG ATRIAL RATE: 82 BPM
EKG P AXIS: 80 DEGREES
EKG P-R INTERVAL: 142 MS
EKG Q-T INTERVAL: 388 MS
EKG QRS DURATION: 128 MS
EKG QTC CALCULATION (BAZETT): 453 MS
EKG R AXIS: 76 DEGREES
EKG T AXIS: 63 DEGREES
EKG VENTRICULAR RATE: 82 BPM

## 2024-04-17 NOTE — DISCHARGE SUMMARY
Hospital Medicine Discharge Summary      Patient Identification:   Deloris Watts   : 1955  MRN: [id number]   Account: [id number]      Patient's PCP: Johana Weldon MD    Admit Date: 4/10/2024     Discharge Date: 2024    Admitting Physician: Johana Weldon MD     Discharge Physician: Jesus Alberto Roberts PA-C     Deloris Watts is a 68 y.o. female admitted to [facility] on 4/10/2024.      HPI On Admission From H&P:    \"Deloris Watts is a 68 y.o. female with a history of ESRD on hemodialysis, stroke, anemia and CKD, depression and anxiety, hyperlipidemia, hypertension, gastroesophageal reflux disease, and recent left hip fracture who presented to [facility] with chief complaint of altered mental status and hallucinations.  History is provided by the patient as well as the patient's .  The patient's  reports that the patient has some baseline cognitive impairment.  She will often have difficulty with orientation questions.  She was recently admitted to the hospital for a left hip fracture and underwent a left Misael hip arthroplasty on 3/11/2024.  She was discharged to inpatient rehab and underwent a short stay in rehab following this.  She was subsequently then discharged home.  The patient's  reports that she has not been doing her exercises at home and has not been eating well.  He reports that she has made comments in the past about not wanting to be alive.  More recently, the patient began to have visual hallucinations and also delusions.  She apparently called her neighbor and one of her relatives this morning and was telling them that she was going to die today.  As such, she was brought to the hospital for further evaluation.  In the emergency department, she tested positive for COVID-19.  She does endorse a cough and some shortness of breath.  No fevers or chills.  The patient also did miss dialysis on Monday as she was ill.  As such, she was last

## 2024-04-29 ENCOUNTER — TELEPHONE (OUTPATIENT)
Dept: PSYCHIATRY | Age: 69
End: 2024-04-29

## 2024-04-29 NOTE — TELEPHONE ENCOUNTER
Tenzin and Jessica called in to get patient's new patient appointment with Lily Colorado set up.    Patient was in the hospital and a psychiatry referral was placed. Family was asking for a urgent appointment and was informed that we don't have anything until May. Told family about Bob's walk ins. Gave them Bob's phone number's and mailed the [facility] information.    Family stated patient is S/I and was informed to take her to the ER. Family stated \"she tries to kill herself by not eating or going to dialysis. We have to force her out of bed to go and have threatened to lift her and carry her out. She also has been getting up without assistance because she knows she will fall and hurt herself. She just fell not to long ago and broke her hip\". Family stated all she talks about is \"Dying and getting cremated\". Family stated this has been going on for years.    Family stated patient was on Fielding from her hip surgery and about 3 weeks ago that was stopped and she is taking Tylenol now. No other medication changes per family.  Family stated patient doesn't have any issues sleeping. Patient does take Trazodone to help her sleep.  Family denies any new stressors.    Last visit 07/05/23  Next visit 05/02/24    Patient see's Lily Colorado on 05/30/24.    Pending your advice.

## 2024-04-29 NOTE — PROGRESS NOTES
[facility]  [address]  [place] [address]  Phone:  [phone]          Name: Deloris Watts  : 1955    Chief Complaint   Patient presents with    Altered Mental Status    Depression     Suicidal thoughts   Seeing psych        HPI:     Deloris Watts is a 68 y.o. female who presents today with her  Tenzin and her DIL Jessica for evaluation of depression.  She has ESRD on HD M/W/F and she doesn't want to continue with it.  She has had thoughts she'd be better off dead, but states she isn't ready to die.  She's having dreams that she only has 3 days to live.  She has been taking her Wellbutrin 450 mg daily.  She will be seeing Kathya Mendoza tomorrow and Lily in psych on .      She has a tremor in her hands that has been there for several months and is worsening.  It's keeping her from being active and using her phone.    Her DIL reports that Deloris is having trouble articulating her words and often has slurred speech.  She's doing therapy at [facility] and would like to add on speech therapy.    Her BMs have been more regular but she still has urinary incontinence.  She will be seeing urology .      Current Outpatient Medications:     buPROPion (WELLBUTRIN XL) 150 MG extended release tablet, Take 1 tablet by mouth every morning, Disp: 90 tablet, Rfl: 0    primidone (MYSOLINE) 50 MG tablet, Take 1 tablet by mouth daily, Disp: 90 tablet, Rfl: 0    sertraline (ZOLOFT) 50 MG tablet, Take 1 tablet by mouth daily, Disp: 30 tablet, Rfl: 1    traMADol (ULTRAM) 50 MG tablet, TAKE 1 TABLET BY MOUTH EVERY 8 HOURS FOR 7 DAYS, Disp: , Rfl:     polyethylene glycol (GLYCOLAX) 17 g packet, Take 1 packet by mouth daily as needed for Constipation, Disp: 527 g, Rfl: 0    buPROPion (WELLBUTRIN XL) 300 MG extended release tablet, Take 1 tablet by mouth daily, Disp: 30 tablet, Rfl: 3    traZODone (DESYREL) 100 MG tablet, Take 1 tablet by mouth nightly For sleep, Disp: 30 tablet, Rfl:

## 2024-04-30 ENCOUNTER — TELEPHONE (OUTPATIENT)
Dept: PSYCHOLOGY | Age: 69
End: 2024-04-30

## 2024-04-30 NOTE — TELEPHONE ENCOUNTER
I called and left a message at  Tenzin's phone number. He and daughter Jessica called the office yesterday and were advised to take Deloris to the ED for evaluation due to her agitation and declining status.    I encourage Tenzin to call the office again if he had any questions and we could discuss options.

## 2024-05-01 ENCOUNTER — OFFICE VISIT (OUTPATIENT)
Dept: FAMILY MEDICINE CLINIC | Age: 69
End: 2024-05-01

## 2024-05-01 VITALS
HEART RATE: 65 BPM | OXYGEN SATURATION: 92 % | DIASTOLIC BLOOD PRESSURE: 74 MMHG | SYSTOLIC BLOOD PRESSURE: 122 MMHG | HEIGHT: 60 IN | TEMPERATURE: 97.9 F | BODY MASS INDEX: 19.09 KG/M2 | RESPIRATION RATE: 17 BRPM | WEIGHT: 97.22 LBS

## 2024-05-01 DIAGNOSIS — G25.0 ESSENTIAL TREMOR: ICD-10-CM

## 2024-05-01 DIAGNOSIS — F32.A ANXIETY AND DEPRESSION: Primary | ICD-10-CM

## 2024-05-01 DIAGNOSIS — F41.9 ANXIETY AND DEPRESSION: Primary | ICD-10-CM

## 2024-05-01 DIAGNOSIS — R47.81 SLURRED SPEECH: ICD-10-CM

## 2024-05-01 RX ORDER — PRIMIDONE 50 MG/1
50 TABLET ORAL DAILY
Qty: 90 TABLET | Refills: 0 | Status: SHIPPED | OUTPATIENT
Start: 2024-05-01

## 2024-05-01 RX ORDER — BUPROPION HYDROCHLORIDE 150 MG/1
150 TABLET ORAL EVERY MORNING
Qty: 90 TABLET | Refills: 0
Start: 2024-05-01

## 2024-05-01 NOTE — PROGRESS NOTES
:)    [] Try \"Expressive Writing\" using the guidelines on the handout    Head injury guidelines, as discussed   Continue enjoying time with friends on a regular basis.  Practice daily relaxation training, again.   [facility]--explore options   Continue to focus on improving quality of life, for now  Recognize your strengths, which include facilitating connections between other people.   Spend time with friends, not shopping with Nicole  Continue with self-care and \"Serenity Prayer\" for letting go of things you cannot control or fix.  Limits on brother Ej, as discussed in session  Due to memory challenges, I had patient write down our instructions  Resume deep breathing and see if you can get some time outdoors  Instructions to family about how to support her    Session lasted 28 minutes.     Provider Signature:  Electronically signed by Reina Mendoza, PhD on 5/2/2024 at 2:05 PM

## 2024-05-02 ENCOUNTER — TELEMEDICINE (OUTPATIENT)
Dept: PSYCHOLOGY | Age: 69
End: 2024-05-02
Payer: MEDICARE

## 2024-05-02 DIAGNOSIS — F41.1 GENERALIZED ANXIETY DISORDER: Primary | Chronic | ICD-10-CM

## 2024-05-02 PROCEDURE — 90832 PSYTX W PT 30 MINUTES: CPT | Performed by: PSYCHOLOGIST

## 2024-05-02 PROCEDURE — 1123F ACP DISCUSS/DSCN MKR DOCD: CPT | Performed by: PSYCHOLOGIST

## 2024-05-06 ENCOUNTER — APPOINTMENT (OUTPATIENT)
Dept: CT IMAGING | Age: 69
DRG: 640 | End: 2024-05-06
Payer: MEDICARE

## 2024-05-06 ENCOUNTER — APPOINTMENT (OUTPATIENT)
Dept: GENERAL RADIOLOGY | Age: 69
DRG: 640 | End: 2024-05-06
Payer: MEDICARE

## 2024-05-06 ENCOUNTER — HOSPITAL ENCOUNTER (INPATIENT)
Age: 69
LOS: 2 days | Discharge: HOME | DRG: 640 | End: 2024-05-08
Attending: FAMILY MEDICINE | Admitting: HOSPITALIST
Payer: MEDICARE

## 2024-05-06 DIAGNOSIS — R79.89 ELEVATED SERUM CREATININE: ICD-10-CM

## 2024-05-06 DIAGNOSIS — R41.82 ALTERED MENTAL STATUS, UNSPECIFIED ALTERED MENTAL STATUS TYPE: Primary | ICD-10-CM

## 2024-05-06 LAB
ALBUMIN SERPL BCG-MCNC: 3.9 G/DL (ref 3.5–5.1)
ALP SERPL-CCNC: 122 U/L (ref 38–126)
ALT SERPL W/O P-5'-P-CCNC: 16 U/L (ref 11–66)
AMMONIA PLAS-MCNC: 24 UMOL/L (ref 11–60)
AMPHETAMINES UR QL SCN: NEGATIVE
ANION GAP SERPL CALC-SCNC: 19 MEQ/L (ref 8–16)
AST SERPL-CCNC: 18 U/L (ref 5–40)
BACTERIA URNS QL MICRO: ABNORMAL /HPF
BARBITURATES UR QL SCN: POSITIVE
BASOPHILS ABSOLUTE: 0.1 THOU/MM3 (ref 0–0.1)
BASOPHILS NFR BLD AUTO: 0.8 %
BENZODIAZ UR QL SCN: NEGATIVE
BILIRUB SERPL-MCNC: 0.3 MG/DL (ref 0.3–1.2)
BILIRUB UR QL STRIP.AUTO: NEGATIVE
BUN SERPL-MCNC: 42 MG/DL (ref 7–22)
BZE UR QL SCN: NEGATIVE
CALCIUM SERPL-MCNC: 9 MG/DL (ref 8.5–10.5)
CANNABINOIDS UR QL SCN: NEGATIVE
CASTS #/AREA URNS LPF: ABNORMAL /LPF
CASTS 2: ABNORMAL /LPF
CHARACTER UR: CLEAR
CHLORIDE SERPL-SCNC: 99 MEQ/L (ref 98–111)
CO2 SERPL-SCNC: 22 MEQ/L (ref 23–33)
COLOR: YELLOW
CREAT SERPL-MCNC: 6.4 MG/DL (ref 0.4–1.2)
CRYSTALS URNS MICRO: ABNORMAL
DEPRECATED RDW RBC AUTO: 53.6 FL (ref 35–45)
EOSINOPHIL NFR BLD AUTO: 1.4 %
EOSINOPHILS ABSOLUTE: 0.1 THOU/MM3 (ref 0–0.4)
EPITHELIAL CELLS, UA: ABNORMAL /HPF
ERYTHROCYTE [DISTWIDTH] IN BLOOD BY AUTOMATED COUNT: 16.7 % (ref 11.5–14.5)
FENTANYL: NEGATIVE
FERRITIN SERPL IA-MCNC: 2749 NG/ML (ref 10–291)
FOLATE SERPL-MCNC: > 20 NG/ML (ref 4.8–24.2)
GFR SERPL CREATININE-BSD FRML MDRD: 7 ML/MIN/1.73M2
GLUCOSE BLD STRIP.AUTO-MCNC: 89 MG/DL (ref 70–108)
GLUCOSE BLD STRIP.AUTO-MCNC: 97 MG/DL (ref 70–108)
GLUCOSE SERPL-MCNC: 85 MG/DL (ref 70–108)
GLUCOSE UR QL STRIP.AUTO: 250 MG/DL
HCT VFR BLD AUTO: 27.6 % (ref 37–47)
HGB BLD-MCNC: 8.7 GM/DL (ref 12–16)
HGB UR QL STRIP.AUTO: NEGATIVE
IMM GRANULOCYTES # BLD AUTO: 0.18 THOU/MM3 (ref 0–0.07)
IMM GRANULOCYTES NFR BLD AUTO: 1.9 %
IRON SATN MFR SERPL: 38 % (ref 20–50)
IRON SERPL-MCNC: 72 UG/DL (ref 50–170)
KETONES UR QL STRIP.AUTO: NEGATIVE
LYMPHOCYTES ABSOLUTE: 1.7 THOU/MM3 (ref 1–4.8)
LYMPHOCYTES NFR BLD AUTO: 18 %
MCH RBC QN AUTO: 31.3 PG (ref 26–33)
MCHC RBC AUTO-ENTMCNC: 31.5 GM/DL (ref 32.2–35.5)
MCV RBC AUTO: 99.3 FL (ref 81–99)
MISCELLANEOUS 2: ABNORMAL
MONOCYTES ABSOLUTE: 0.9 THOU/MM3 (ref 0.4–1.3)
MONOCYTES NFR BLD AUTO: 9.6 %
NEUTROPHILS ABSOLUTE: 6.4 THOU/MM3 (ref 1.8–7.7)
NEUTROPHILS NFR BLD AUTO: 68.3 %
NITRITE UR QL STRIP: NEGATIVE
NRBC BLD AUTO-RTO: 0 /100 WBC
OPIATES UR QL SCN: NEGATIVE
OSMOLALITY SERPL CALC.SUM OF ELEC: 289.1 MOSMOL/KG (ref 275–300)
OXYCODONE: NEGATIVE
PCP UR QL SCN: NEGATIVE
PH UR STRIP.AUTO: 8.5 [PH] (ref 5–9)
PLATELET # BLD AUTO: 413 THOU/MM3 (ref 130–400)
PMV BLD AUTO: 9.2 FL (ref 9.4–12.4)
POTASSIUM SERPL-SCNC: 3.4 MEQ/L (ref 3.5–5.2)
PROT SERPL-MCNC: 6.1 G/DL (ref 6.1–8)
PROT UR STRIP.AUTO-MCNC: 100 MG/DL
RBC # BLD AUTO: 2.78 MILL/MM3 (ref 4.2–5.4)
RBC URINE: ABNORMAL /HPF
RENAL EPI CELLS #/AREA URNS HPF: ABNORMAL /[HPF]
SODIUM SERPL-SCNC: 140 MEQ/L (ref 135–145)
SP GR UR REFRACT.AUTO: 1.01 (ref 1–1.03)
TIBC SERPL-MCNC: 190 UG/DL (ref 171–450)
TROPONIN, HIGH SENSITIVITY: 62 NG/L (ref 0–12)
TROPONIN, HIGH SENSITIVITY: 66 NG/L (ref 0–12)
TSH SERPL DL<=0.005 MIU/L-ACNC: 2.15 UIU/ML (ref 0.4–4.2)
UROBILINOGEN, URINE: 0.2 EU/DL (ref 0–1)
VIT B12 SERPL-MCNC: 363 PG/ML (ref 211–911)
WBC # BLD AUTO: 9.3 THOU/MM3 (ref 4.8–10.8)
WBC #/AREA URNS HPF: ABNORMAL /HPF
WBC #/AREA URNS HPF: ABNORMAL /[HPF]
YEAST LIKE FUNGI URNS QL MICRO: ABNORMAL

## 2024-05-06 PROCEDURE — 82140 ASSAY OF AMMONIA: CPT

## 2024-05-06 PROCEDURE — 82746 ASSAY OF FOLIC ACID SERUM: CPT

## 2024-05-06 PROCEDURE — 81001 URINALYSIS AUTO W/SCOPE: CPT

## 2024-05-06 PROCEDURE — 99285 EMERGENCY DEPT VISIT HI MDM: CPT

## 2024-05-06 PROCEDURE — 80053 COMPREHEN METABOLIC PANEL: CPT

## 2024-05-06 PROCEDURE — 84484 ASSAY OF TROPONIN QUANT: CPT

## 2024-05-06 PROCEDURE — 6370000000 HC RX 637 (ALT 250 FOR IP)

## 2024-05-06 PROCEDURE — 82728 ASSAY OF FERRITIN: CPT

## 2024-05-06 PROCEDURE — 6360000002 HC RX W HCPCS

## 2024-05-06 PROCEDURE — 70450 CT HEAD/BRAIN W/O DYE: CPT

## 2024-05-06 PROCEDURE — 80307 DRUG TEST PRSMV CHEM ANLYZR: CPT

## 2024-05-06 PROCEDURE — 1200000003 HC TELEMETRY R&B

## 2024-05-06 PROCEDURE — 36415 COLL VENOUS BLD VENIPUNCTURE: CPT

## 2024-05-06 PROCEDURE — 84443 ASSAY THYROID STIM HORMONE: CPT

## 2024-05-06 PROCEDURE — 87186 SC STD MICRODIL/AGAR DIL: CPT

## 2024-05-06 PROCEDURE — 82607 VITAMIN B-12: CPT

## 2024-05-06 PROCEDURE — 85025 COMPLETE CBC W/AUTO DIFF WBC: CPT

## 2024-05-06 PROCEDURE — 83550 IRON BINDING TEST: CPT

## 2024-05-06 PROCEDURE — 83540 ASSAY OF IRON: CPT

## 2024-05-06 PROCEDURE — 6370000000 HC RX 637 (ALT 250 FOR IP): Performed by: INTERNAL MEDICINE

## 2024-05-06 PROCEDURE — 82948 REAGENT STRIP/BLOOD GLUCOSE: CPT

## 2024-05-06 PROCEDURE — 71045 X-RAY EXAM CHEST 1 VIEW: CPT

## 2024-05-06 PROCEDURE — 99222 1ST HOSP IP/OBS MODERATE 55: CPT | Performed by: INTERNAL MEDICINE

## 2024-05-06 PROCEDURE — 2580000003 HC RX 258

## 2024-05-06 PROCEDURE — 93005 ELECTROCARDIOGRAM TRACING: CPT | Performed by: STUDENT IN AN ORGANIZED HEALTH CARE EDUCATION/TRAINING PROGRAM

## 2024-05-06 PROCEDURE — 87077 CULTURE AEROBIC IDENTIFY: CPT

## 2024-05-06 PROCEDURE — 99223 1ST HOSP IP/OBS HIGH 75: CPT | Performed by: HOSPITALIST

## 2024-05-06 PROCEDURE — 87086 URINE CULTURE/COLONY COUNT: CPT

## 2024-05-06 RX ORDER — SODIUM CHLORIDE 9 MG/ML
INJECTION, SOLUTION INTRAVENOUS PRN
Status: DISCONTINUED | OUTPATIENT
Start: 2024-05-06 | End: 2024-05-08 | Stop reason: HOSPADM

## 2024-05-06 RX ORDER — AMLODIPINE BESYLATE 5 MG/1
5 TABLET ORAL DAILY
Status: DISCONTINUED | OUTPATIENT
Start: 2024-05-06 | End: 2024-05-08 | Stop reason: HOSPADM

## 2024-05-06 RX ORDER — ACETAMINOPHEN 325 MG/1
650 TABLET ORAL EVERY 6 HOURS PRN
Status: DISCONTINUED | OUTPATIENT
Start: 2024-05-06 | End: 2024-05-08 | Stop reason: HOSPADM

## 2024-05-06 RX ORDER — DOCUSATE SODIUM 100 MG/1
100 CAPSULE, LIQUID FILLED ORAL 3 TIMES DAILY
Status: DISCONTINUED | OUTPATIENT
Start: 2024-05-06 | End: 2024-05-08 | Stop reason: HOSPADM

## 2024-05-06 RX ORDER — DEXTROSE MONOHYDRATE 100 MG/ML
INJECTION, SOLUTION INTRAVENOUS CONTINUOUS PRN
Status: DISCONTINUED | OUTPATIENT
Start: 2024-05-06 | End: 2024-05-08 | Stop reason: HOSPADM

## 2024-05-06 RX ORDER — ONDANSETRON 2 MG/ML
4 INJECTION INTRAMUSCULAR; INTRAVENOUS EVERY 6 HOURS PRN
Status: DISCONTINUED | OUTPATIENT
Start: 2024-05-06 | End: 2024-05-08 | Stop reason: HOSPADM

## 2024-05-06 RX ORDER — HEPARIN SODIUM 5000 [USP'U]/ML
5000 INJECTION, SOLUTION INTRAVENOUS; SUBCUTANEOUS 2 TIMES DAILY
Status: DISCONTINUED | OUTPATIENT
Start: 2024-05-06 | End: 2024-05-08 | Stop reason: HOSPADM

## 2024-05-06 RX ORDER — SEVELAMER CARBONATE 800 MG/1
800 TABLET, FILM COATED ORAL 2 TIMES DAILY WITH MEALS
Status: DISCONTINUED | OUTPATIENT
Start: 2024-05-06 | End: 2024-05-08 | Stop reason: HOSPADM

## 2024-05-06 RX ORDER — SODIUM CHLORIDE 0.9 % (FLUSH) 0.9 %
5-40 SYRINGE (ML) INJECTION PRN
Status: DISCONTINUED | OUTPATIENT
Start: 2024-05-06 | End: 2024-05-08 | Stop reason: HOSPADM

## 2024-05-06 RX ORDER — BUPROPION HYDROCHLORIDE 150 MG/1
150 TABLET ORAL EVERY MORNING
Status: DISCONTINUED | OUTPATIENT
Start: 2024-05-07 | End: 2024-05-08 | Stop reason: HOSPADM

## 2024-05-06 RX ORDER — MEGESTROL ACETATE 40 MG/1
40 TABLET ORAL DAILY
Status: DISCONTINUED | OUTPATIENT
Start: 2024-05-06 | End: 2024-05-08 | Stop reason: HOSPADM

## 2024-05-06 RX ORDER — ACETAMINOPHEN 650 MG/1
650 SUPPOSITORY RECTAL EVERY 6 HOURS PRN
Status: DISCONTINUED | OUTPATIENT
Start: 2024-05-06 | End: 2024-05-08 | Stop reason: HOSPADM

## 2024-05-06 RX ORDER — ONDANSETRON 4 MG/1
4 TABLET, ORALLY DISINTEGRATING ORAL EVERY 8 HOURS PRN
Status: DISCONTINUED | OUTPATIENT
Start: 2024-05-06 | End: 2024-05-08 | Stop reason: HOSPADM

## 2024-05-06 RX ORDER — FOLIC ACID 1 MG/1
500 TABLET ORAL DAILY
Status: DISCONTINUED | OUTPATIENT
Start: 2024-05-06 | End: 2024-05-08 | Stop reason: HOSPADM

## 2024-05-06 RX ORDER — ATORVASTATIN CALCIUM 10 MG/1
10 TABLET, FILM COATED ORAL DAILY
Status: DISCONTINUED | OUTPATIENT
Start: 2024-05-06 | End: 2024-05-08 | Stop reason: HOSPADM

## 2024-05-06 RX ORDER — BISACODYL 10 MG
10 SUPPOSITORY, RECTAL RECTAL DAILY PRN
Status: DISCONTINUED | OUTPATIENT
Start: 2024-05-06 | End: 2024-05-08 | Stop reason: HOSPADM

## 2024-05-06 RX ORDER — SODIUM CHLORIDE 0.9 % (FLUSH) 0.9 %
5-40 SYRINGE (ML) INJECTION EVERY 12 HOURS SCHEDULED
Status: DISCONTINUED | OUTPATIENT
Start: 2024-05-06 | End: 2024-05-08 | Stop reason: HOSPADM

## 2024-05-06 RX ORDER — IBUPROFEN 600 MG/1
1 TABLET ORAL PRN
Status: DISCONTINUED | OUTPATIENT
Start: 2024-05-06 | End: 2024-05-08 | Stop reason: HOSPADM

## 2024-05-06 RX ORDER — POLYETHYLENE GLYCOL 3350 17 G/17G
17 POWDER, FOR SOLUTION ORAL DAILY PRN
Status: DISCONTINUED | OUTPATIENT
Start: 2024-05-06 | End: 2024-05-08 | Stop reason: HOSPADM

## 2024-05-06 RX ORDER — BUPROPION HYDROCHLORIDE 300 MG/1
300 TABLET ORAL NIGHTLY
Status: DISCONTINUED | OUTPATIENT
Start: 2024-05-06 | End: 2024-05-08 | Stop reason: HOSPADM

## 2024-05-06 RX ORDER — CLOPIDOGREL BISULFATE 75 MG/1
75 TABLET ORAL DAILY
Status: DISCONTINUED | OUTPATIENT
Start: 2024-05-06 | End: 2024-05-08 | Stop reason: HOSPADM

## 2024-05-06 RX ORDER — FLUTICASONE PROPIONATE 50 MCG
2 SPRAY, SUSPENSION (ML) NASAL DAILY
Status: DISCONTINUED | OUTPATIENT
Start: 2024-05-07 | End: 2024-05-08 | Stop reason: HOSPADM

## 2024-05-06 RX ADMIN — POTASSIUM BICARBONATE 20 MEQ: 782 TABLET, EFFERVESCENT ORAL at 20:25

## 2024-05-06 RX ADMIN — HEPARIN SODIUM 5000 UNITS: 5000 INJECTION INTRAVENOUS; SUBCUTANEOUS at 20:30

## 2024-05-06 RX ADMIN — SERTRALINE 50 MG: 50 TABLET, FILM COATED ORAL at 20:19

## 2024-05-06 RX ADMIN — SODIUM CHLORIDE, PRESERVATIVE FREE 10 ML: 5 INJECTION INTRAVENOUS at 20:30

## 2024-05-06 RX ADMIN — SEVELAMER CARBONATE 800 MG: 800 TABLET, FILM COATED ORAL at 20:19

## 2024-05-06 RX ADMIN — ATORVASTATIN CALCIUM 10 MG: 10 TABLET, FILM COATED ORAL at 20:19

## 2024-05-06 ASSESSMENT — PAIN - FUNCTIONAL ASSESSMENT: PAIN_FUNCTIONAL_ASSESSMENT: NONE - DENIES PAIN

## 2024-05-06 ASSESSMENT — LIFESTYLE VARIABLES: HOW OFTEN DO YOU HAVE A DRINK CONTAINING ALCOHOL: NEVER

## 2024-05-06 NOTE — H&P
place.  Respiratory:  Normal respiratory effort. Clear to auscultation, bilaterally without rales or wheezes or rhonchi.  Cardiovascular: Normal rate, regular rhythm with normal S1/S2 without murmurs.  No lower extremity edema.   Abdomen: Soft, non-tender, non-distended with normal bowel sounds.  Musculoskeletal: No joint swelling or tenderness. Normal tone. No abnormal movements.   Skin: Warm and dry. No rashes or lesions.  Neurologic:  No focal sensory in the upper and lower extremities. Cranial nerves:  grossly non-focal 2-12.  Has weaker left foot.  Psychiatric: Alert, awake, oriented to self and place however not time.  Capillary Refill: Brisk,< 3 seconds.           Peripheral Pulses: +2 palpable, equal bilaterally.       EKG:  I have reviewed the EKG with the following interpretation: NSR      Labs:     Recent Labs     05/06/24  1552   WBC 9.3   HGB 8.7*   HCT 27.6*   *     Recent Labs     05/06/24  1552      K 3.4*   CL 99   CO2 22*   BUN 42*   CREATININE 6.4*   CALCIUM 9.0     Recent Labs     05/06/24  1552   AST 18   ALT 16   BILITOT 0.3   ALKPHOS 122     No results for input(s): \"INR\" in the last 72 hours.  No results for input(s): \"TROPONINT\" in the last 72 hours.  No results for input(s): \"PROCAL\" in the last 72 hours.   Lab Results   Component Value Date/Time    NITRU NEGATIVE 05/06/2024 03:54 PM    WBCUA 25-50 05/06/2024 03:54 PM    BACTERIA NONE SEEN 05/06/2024 03:54 PM    RBCUA NONE SEEN 05/06/2024 03:54 PM    BLOODU NEGATIVE 05/06/2024 03:54 PM    SPECGRAV 1.020 04/04/2024 02:29 PM    GLUCOSEU 250 05/06/2024 03:54 PM         Radiology:   CT HEAD WO CONTRAST   Final Result   No acute intracranial hemorrhage, mass effect or midline shift.         **This report has been created using voice recognition software. It may contain minor errors which are inherent in voice recognition technology.**      Final report electronically signed by Dr Jude Robbins on 5/6/2024 4:43 PM      XR CHEST

## 2024-05-06 NOTE — CONSULTS
[facility]    [address], [place] [address]  [phone]     Hospital Consult  5/6/2024 6:45 PM    Pt Name:    Deloris Watts  MRN:     [id number]   [id number]  YOB: 1955  Admit Date:    5/6/2024  3:22 PM  Primary Care Physician:  Johana Weldon MD    CSN Number:   [id number]    Reason for Consult:  ESRD on dialysis  Requesting provider:  Hospitalist    History:   The patient is a 68 y.o. with history of ESRD on dialysis Mondays, Wednesdays and Fridays, hypertension, history of anxiety who was brought to the hospital for evaluation of altered mental status.  Information obtained by reviewing her medical chart and talking to the patient.  Patient is currently on room air.  She is awake and alert.  She is oriented to place and person.  Apparently it is reported that patient was noted to be increasingly confused by her .  Apparently she went to her dialysis treatment but because of confusion she was diverted to the emergency room.  Patient is awake and alert but not a very good historian.  She denies any fever or chills.  No chest pain.  No shortness of breath.  Presently, she is on room air.    Past Medical History:  Past Medical History:   Diagnosis Date    Allergic rhinitis     Anemia in CKD (chronic kidney disease)     Anxiety     CHF (congestive heart failure) ([facility])     Closed fracture of right proximal humerus 09/18/2021    ORIF    Closed right ankle fracture     In 1990s; treated with casting    CVA (cerebral infarction)     in 1990s ; with left-sided weakness    Depression     Fibromyalgia     in 1990s    Headache(784.0)     Hemiplegia and hemiparesis following cerebral infarction affecting left non-dominant side (HCC)     in 1990s    Hydronephrosis 09/01/2023    Urogenital Implants, calculus of ureter, UTI    Hyperlipidemia     Hypertension     in 1980s    Irritable bowel syndrome     in 1990s    Kidney failure     Chronic Kidney Disease, Renal

## 2024-05-06 NOTE — ED TRIAGE NOTES
Pt presents to the ED through triage with c/c AMS. On arrival to ED, pt is alert to person, place, and time. Pt currently gets dialysis,  states that he brought her to dialysis today, but she was confused so Dialysis center instructed them to come to ED. No dialysis performed. EKG completed on arrival. Blood sugar 89. Pt sees Dr. Abraham

## 2024-05-06 NOTE — ED PROVIDER NOTES
unspecified altered mental status type    2. Elevated serum creatinine          DISPOSITION/PLAN   Condition: condition: stable  Dispo: Admit to med/surg floor  DISPOSITION        Outpatient follow up (If applicable):  No follow-up provider specified.  Not applicable          DISCHARGE PRESCRIPTIONS: (None if blank)  New Prescriptions    No medications on file         This transcription was electronically signed. Parts of this transcriptions may have been dictated by use of voice recognition software and electronically transcribed, and parts may have been transcribed with the assistance of an ED scribe. The transcription may contain errors not detected in proofreading.  Please refer to my supervising physician's documentation if my documentation differs.    Electronically Signed: Mikayla Bronson MD, 05/06/24, 5:07 PM

## 2024-05-06 NOTE — ED NOTES
Dr. Bronson at bedside discussing results.  
Pt resting on cot. Vitals stable. Call light in reach.  at bedside   
Pt transported to [facility] in stable condition. Floor contacted before transport,spoke to Tommy   
MD Koby at [facility] OR    ENDOSCOPY, COLON, DIAGNOSTIC  unsure    HEMORRHOID SURGERY  unsure    HIP SURGERY Left 3/11/2024    Left Misael Hip Arthroplasty performed by Virgilio Lezama DO at [facility] OR    HUMERUS FRACTURE SURGERY Right 2021    ORIF    HYSTERECTOMY (CERVIX STATUS UNKNOWN)      age 38    LASIK      lasik    NECK SURGERY  18  years ago    cervical spine fusion ; in 1990s    OVARY REMOVAL Bilateral     age 38    SINUS SURGERY  10 years ago    SPINE SURGERY N/A 12/18/2023    KYPHOPLASTY L2 performed by Nimisha Garza MD at [facility] OR    TUBAL LIGATION      URETER SURGERY N/A 09/20/2023    CYSTOSCOPY, LEFT  URETEROSCOPY, LASER LITHOTRIPSIE OF STONES performed by Edilson Whalen MD at [facility] OR       PAST MEDICAL HISTORY       Past Medical History:   Diagnosis Date    Allergic rhinitis     Anemia in CKD (chronic kidney disease)     Anxiety     CHF (congestive heart failure) (HCC)     Closed fracture of right proximal humerus 09/18/2021    ORIF    Closed right ankle fracture     In 1990s; treated with casting    CVA (cerebral infarction)     in 1990s ; with left-sided weakness    Depression     Fibromyalgia     in 1990s    Headache(784.0)     Hemiplegia and hemiparesis following cerebral infarction affecting left non-dominant side (HCC)     in 1990s    Hydronephrosis 09/01/2023    Urogenital Implants, calculus of ureter, UTI    Hyperlipidemia     Hypertension     in 1980s    Irritable bowel syndrome     in 1990s    Kidney failure     Chronic Kidney Disease, Renal Dialysis started in 1/2023    Osteoporosis 2019    Unspecified cerebral artery occlusion with cerebral infarction     Unspecified sleep apnea     Wrist fracture, right 2018    Treated with casting           Electronically signed by Moisés Haynes RN on 5/6/2024 at 5:33 PM

## 2024-05-07 LAB
ANION GAP SERPL CALC-SCNC: 16 MEQ/L (ref 8–16)
BACTERIA UR CULT: ABNORMAL
BASOPHILS ABSOLUTE: 0.1 THOU/MM3 (ref 0–0.1)
BASOPHILS NFR BLD AUTO: 0.8 %
BUN SERPL-MCNC: 48 MG/DL (ref 7–22)
CALCIUM SERPL-MCNC: 9 MG/DL (ref 8.5–10.5)
CHLORIDE SERPL-SCNC: 104 MEQ/L (ref 98–111)
CO2 SERPL-SCNC: 23 MEQ/L (ref 23–33)
CREAT SERPL-MCNC: 6.9 MG/DL (ref 0.4–1.2)
DEPRECATED RDW RBC AUTO: 58.8 FL (ref 35–45)
EKG ATRIAL RATE: 92 BPM
EKG P AXIS: 76 DEGREES
EKG P-R INTERVAL: 138 MS
EKG Q-T INTERVAL: 378 MS
EKG QRS DURATION: 120 MS
EKG QTC CALCULATION (BAZETT): 467 MS
EKG R AXIS: 92 DEGREES
EKG T AXIS: 60 DEGREES
EKG VENTRICULAR RATE: 92 BPM
EOSINOPHIL NFR BLD AUTO: 0.9 %
EOSINOPHILS ABSOLUTE: 0.1 THOU/MM3 (ref 0–0.4)
ERYTHROCYTE [DISTWIDTH] IN BLOOD BY AUTOMATED COUNT: 17.1 % (ref 11.5–14.5)
GFR SERPL CREATININE-BSD FRML MDRD: 6 ML/MIN/1.73M2
GLUCOSE BLD STRIP.AUTO-MCNC: 127 MG/DL (ref 70–108)
GLUCOSE SERPL-MCNC: 69 MG/DL (ref 70–108)
HCT VFR BLD AUTO: 27.8 % (ref 37–47)
HGB BLD-MCNC: 8.7 GM/DL (ref 12–16)
IMM GRANULOCYTES # BLD AUTO: 0.12 THOU/MM3 (ref 0–0.07)
IMM GRANULOCYTES NFR BLD AUTO: 1.5 %
LYMPHOCYTES ABSOLUTE: 1.7 THOU/MM3 (ref 1–4.8)
LYMPHOCYTES NFR BLD AUTO: 20.8 %
MCH RBC QN AUTO: 31.8 PG (ref 26–33)
MCHC RBC AUTO-ENTMCNC: 31.3 GM/DL (ref 32.2–35.5)
MCV RBC AUTO: 101.5 FL (ref 81–99)
MONOCYTES ABSOLUTE: 0.8 THOU/MM3 (ref 0.4–1.3)
MONOCYTES NFR BLD AUTO: 9.4 %
NEUTROPHILS ABSOLUTE: 5.3 THOU/MM3 (ref 1.8–7.7)
NEUTROPHILS NFR BLD AUTO: 66.6 %
NRBC BLD AUTO-RTO: 0 /100 WBC
ORGANISM: ABNORMAL
OSMOLALITY SERPL CALC.SUM OF ELEC: 296 MOSMOL/KG (ref 275–300)
PLATELET # BLD AUTO: 330 THOU/MM3 (ref 130–400)
PMV BLD AUTO: 9.5 FL (ref 9.4–12.4)
POTASSIUM SERPL-SCNC: 4.3 MEQ/L (ref 3.5–5.2)
RBC # BLD AUTO: 2.74 MILL/MM3 (ref 4.2–5.4)
SODIUM SERPL-SCNC: 143 MEQ/L (ref 135–145)
WBC # BLD AUTO: 8 THOU/MM3 (ref 4.8–10.8)

## 2024-05-07 PROCEDURE — 36415 COLL VENOUS BLD VENIPUNCTURE: CPT

## 2024-05-07 PROCEDURE — 93010 ELECTROCARDIOGRAM REPORT: CPT | Performed by: INTERNAL MEDICINE

## 2024-05-07 PROCEDURE — 6360000002 HC RX W HCPCS

## 2024-05-07 PROCEDURE — 82948 REAGENT STRIP/BLOOD GLUCOSE: CPT

## 2024-05-07 PROCEDURE — 85025 COMPLETE CBC W/AUTO DIFF WBC: CPT

## 2024-05-07 PROCEDURE — 1200000003 HC TELEMETRY R&B

## 2024-05-07 PROCEDURE — 80048 BASIC METABOLIC PNL TOTAL CA: CPT

## 2024-05-07 PROCEDURE — 6370000000 HC RX 637 (ALT 250 FOR IP)

## 2024-05-07 PROCEDURE — 99232 SBSQ HOSP IP/OBS MODERATE 35: CPT | Performed by: INTERNAL MEDICINE

## 2024-05-07 PROCEDURE — 99233 SBSQ HOSP IP/OBS HIGH 50: CPT | Performed by: HOSPITALIST

## 2024-05-07 PROCEDURE — 5A1D70Z PERFORMANCE OF URINARY FILTRATION, INTERMITTENT, LESS THAN 6 HOURS PER DAY: ICD-10-PCS | Performed by: INTERNAL MEDICINE

## 2024-05-07 PROCEDURE — 2580000003 HC RX 258

## 2024-05-07 PROCEDURE — 90935 HEMODIALYSIS ONE EVALUATION: CPT

## 2024-05-07 RX ORDER — LANOLIN ALCOHOL/MO/W.PET/CERES
1000 CREAM (GRAM) TOPICAL DAILY
Status: DISCONTINUED | OUTPATIENT
Start: 2024-05-07 | End: 2024-05-08 | Stop reason: HOSPADM

## 2024-05-07 RX ADMIN — SEVELAMER CARBONATE 800 MG: 800 TABLET, FILM COATED ORAL at 08:40

## 2024-05-07 RX ADMIN — SEVELAMER CARBONATE 800 MG: 800 TABLET, FILM COATED ORAL at 18:13

## 2024-05-07 RX ADMIN — DOCUSATE SODIUM 100 MG: 100 CAPSULE, LIQUID FILLED ORAL at 18:12

## 2024-05-07 RX ADMIN — MEGESTROL ACETATE 40 MG: 40 TABLET ORAL at 08:40

## 2024-05-07 RX ADMIN — DOCUSATE SODIUM 100 MG: 100 CAPSULE, LIQUID FILLED ORAL at 21:55

## 2024-05-07 RX ADMIN — SODIUM CHLORIDE, PRESERVATIVE FREE 10 ML: 5 INJECTION INTRAVENOUS at 08:46

## 2024-05-07 RX ADMIN — SERTRALINE 50 MG: 50 TABLET, FILM COATED ORAL at 08:40

## 2024-05-07 RX ADMIN — FLUTICASONE PROPIONATE 2 SPRAY: 50 SPRAY, METERED NASAL at 08:43

## 2024-05-07 RX ADMIN — ATORVASTATIN CALCIUM 10 MG: 10 TABLET, FILM COATED ORAL at 08:38

## 2024-05-07 RX ADMIN — HEPARIN SODIUM 5000 UNITS: 5000 INJECTION INTRAVENOUS; SUBCUTANEOUS at 20:32

## 2024-05-07 RX ADMIN — POLYETHYLENE GLYCOL 3350 17 G: 17 POWDER, FOR SOLUTION ORAL at 18:12

## 2024-05-07 RX ADMIN — FOLIC ACID 500 MCG: 1 TABLET ORAL at 08:39

## 2024-05-07 RX ADMIN — SODIUM CHLORIDE, PRESERVATIVE FREE 10 ML: 5 INJECTION INTRAVENOUS at 20:33

## 2024-05-07 RX ADMIN — CLOPIDOGREL BISULFATE 75 MG: 75 TABLET ORAL at 08:39

## 2024-05-07 RX ADMIN — Medication 1000 MCG: at 18:14

## 2024-05-07 RX ADMIN — DOCUSATE SODIUM 100 MG: 100 CAPSULE, LIQUID FILLED ORAL at 08:39

## 2024-05-07 RX ADMIN — HEPARIN SODIUM 5000 UNITS: 5000 INJECTION INTRAVENOUS; SUBCUTANEOUS at 08:41

## 2024-05-07 RX ADMIN — BUPROPION HYDROCHLORIDE 150 MG: 150 TABLET, EXTENDED RELEASE ORAL at 08:38

## 2024-05-07 RX ADMIN — BUPROPION HYDROCHLORIDE 300 MG: 300 TABLET, FILM COATED, EXTENDED RELEASE ORAL at 20:31

## 2024-05-07 RX ADMIN — AMLODIPINE BESYLATE 5 MG: 5 TABLET ORAL at 08:38

## 2024-05-07 NOTE — PROGRESS NOTES
Internal Medicine Resident  Progress Note      Patient:  Deloris Watts    Unit/Bed:6K-18/018-A  YOB: 1955  MRN: [id number]   Acct: [id number]   PCP: Johana Weldon MD  Date of Admission: 5/6/2024  Date of Service: Pt seen/examined on 05/07/24      Assessment/Plan:  Acute encephalopathy, improving: I suspect that this is constipation and polypharmacy related in the setting of ESRD.  No hypoxia, dysuria.  No history of cirrhosis or alcohol abuse.  CT head shows no acute intracranial hemorrhage, mass effect, or midline shift.  Chest x-ray shows no interval change since previous study dated 4/10/2024, no acute cardiopulmonary disease.  No leukocytosis.  No reported seizures.  Did miss dialysis treatment.  Glucose level 85.  Ammonia level 24.  TSH within normal limits, anemia panel WNL B12 and folate WNL.  Urine cultures positive for gram-negative bacilli however asymptomatic at this time I suspect that this is colonization.  Nephrology consulted for dialysis as this can be uremic encephalopathy  Started on bowel regimen, I suspect that this is AMS secondary to constipation  Hold trazodone, tramadol, Singulair, Zyrtec  Hypoglycemia protocol, she has history of episodes of hypoglycemia.  ESRD on HD on Monday Wednesday Friday: Access point tunneled dialysis catheter.  Patient stated in the past that she did not want fistula.  Follows with Dr. Wong on sevelamer.  Not on EPO.  Not on midodrine.  Nephrology consulted  BMP in a.m.  High anion gap metabolic acidosis: Likely secondary to uremia, anticipate improvement with hemodialysis.  Elevated troponins: Likely in the second of ESRD and missed dialysis treatment.  Within her baseline.  She denies any chest pain.  If patient develops chest pain repeat troponin and EKG.  Normocytic anemia: Presented with Hgb 8.7, hematocrit 27.6.  MCV 99.3 however previous documentation shows macrocytic anemia.  Will order iron studies with B12 and

## 2024-05-07 NOTE — PROGRESS NOTES
[facility]   Nephrology progress note  5/7/2024, 1:13 PM      Pt Name:    Deloris Watts  MRN:     [id number]     YOB: 1955  Admit Date:    5/6/2024  3:22 PM    Chief Complaint: Nephrology following for ESRD on hemodialysis.    Subjective:  Patient seen and examined  No chest pain or shortness of breath  Clearly confused  at bedside    Objective:  24HR INTAKE/OUTPUT:  No intake or output data in the 24 hours ending 05/07/24 1313   Admission weight: 44.5 kg (98 lb)  Wt Readings from Last 3 Encounters:   05/07/24 44.3 kg (97 lb 10.6 oz)   05/01/24 44.1 kg (97 lb 3.6 oz)   04/16/24 44.8 kg (98 lb 12.8 oz)        Vitals :   Vitals:    05/07/24 0330 05/07/24 0834 05/07/24 1142 05/07/24 1200   BP: (!) 151/77 (!) 163/76 (!) 162/70 (!) 156/78   Pulse: 93 98 99 96   Resp: 16 16 19 17   Temp: 98.2 °F (36.8 °C) 98.2 °F (36.8 °C) 98 °F (36.7 °C) 99.6 °F (37.6 °C)   TempSrc: Oral Oral Oral    SpO2: 100% 100% 99% 100%   Weight:    44.3 kg (97 lb 10.6 oz)   Height:           Physical examination  General Appearance: Cachectic ill nourished no distress  Mouth/Throat:  Oral mucosa moist  Neck:  Supple, no JVD  Lungs:  Breath sounds: clear  Heart::  S1,S2 heard  Abdomen:  Soft, non - tender  Musculoskeletal:  Edema -no edema noted    Medications:  Infusion:    sodium chloride      dextrose       Meds:    sodium chloride flush  5-40 mL IntraVENous 2 times per day    heparin (porcine)  5,000 Units SubCUTAneous BID    amLODIPine  5 mg Oral Daily    buPROPion  150 mg Oral QAM    buPROPion  300 mg Oral Nightly    clopidogrel  75 mg Oral Daily    docusate sodium  100 mg Oral TID    fluticasone  2 spray Each Nostril Daily    folic acid  500 mcg Oral Daily    megestrol  40 mg Oral Daily    sertraline  50 mg Oral Daily    sevelamer  800 mg Oral BID with meals    atorvastatin  10 mg Oral Daily       Lab Data :  CBC:   Recent Labs     05/06/24  1552 05/07/24  0629   WBC 9.3 8.0   HGB 8.7* 8.7*   HCT

## 2024-05-07 NOTE — FLOWSHEET NOTE
05/07/24 1449   Vital Signs   BP (!) 176/73   Temp 99 °F (37.2 °C)   Pulse (!) 101   Respirations 17   SpO2 100 %   Weight - Scale 43.8 kg (96 lb 9 oz)   Weight Method Bed scale   Percent Weight Change -1.13   Pain Assessment   Pain Assessment None - Denies Pain   Post-Hemodialysis Assessment   Post-Treatment Procedures Blood returned;Catheter Capped, clamped with Saline x2 ports   Machine Disinfection Process Acid/Vinegar Clean;Heat Disinfect;Exterior Machine Disinfection   Rinseback Volume (ml) 400 ml   Blood Volume Processed (Liters) 54.7 L   Dialyzer Clearance Lightly streaked   Duration of Treatment (minutes) 90 minutes   Hemodialysis Output (ml) 500 ml   Tolerated Treatment Good     completed 2.5 hr HD TX and removed 500 mls. pt tolerated HD TX well, Dialysis TX ended and all blood returned with 400 mls. CVC dressing is clean, dry and intact. report given to primary nurse, record completed and printed. to be scanned into pt's EMR.

## 2024-05-08 VITALS
TEMPERATURE: 98.3 F | WEIGHT: 88.4 LBS | OXYGEN SATURATION: 98 % | HEART RATE: 97 BPM | DIASTOLIC BLOOD PRESSURE: 67 MMHG | RESPIRATION RATE: 18 BRPM | HEIGHT: 60 IN | SYSTOLIC BLOOD PRESSURE: 150 MMHG | BODY MASS INDEX: 17.36 KG/M2

## 2024-05-08 LAB
ANION GAP SERPL CALC-SCNC: 9 MEQ/L (ref 8–16)
BUN SERPL-MCNC: 17 MG/DL (ref 7–22)
CALCIUM SERPL-MCNC: 8.3 MG/DL (ref 8.5–10.5)
CHLORIDE SERPL-SCNC: 102 MEQ/L (ref 98–111)
CO2 SERPL-SCNC: 29 MEQ/L (ref 23–33)
CREAT SERPL-MCNC: 3.2 MG/DL (ref 0.4–1.2)
DEPRECATED RDW RBC AUTO: 60.3 FL (ref 35–45)
ERYTHROCYTE [DISTWIDTH] IN BLOOD BY AUTOMATED COUNT: 16.8 % (ref 11.5–14.5)
GFR SERPL CREATININE-BSD FRML MDRD: 15 ML/MIN/1.73M2
GLUCOSE BLD STRIP.AUTO-MCNC: 215 MG/DL (ref 70–108)
GLUCOSE SERPL-MCNC: 104 MG/DL (ref 70–108)
HCT VFR BLD AUTO: 32.1 % (ref 37–47)
HGB BLD-MCNC: 9.8 GM/DL (ref 12–16)
MCH RBC QN AUTO: 30.6 PG (ref 26–33)
MCHC RBC AUTO-ENTMCNC: 30.5 GM/DL (ref 32.2–35.5)
MCV RBC AUTO: 100.3 FL (ref 81–99)
PLATELET # BLD AUTO: 325 THOU/MM3 (ref 130–400)
PMV BLD AUTO: 9.3 FL (ref 9.4–12.4)
POTASSIUM SERPL-SCNC: 4.4 MEQ/L (ref 3.5–5.2)
RBC # BLD AUTO: 3.2 MILL/MM3 (ref 4.2–5.4)
SODIUM SERPL-SCNC: 140 MEQ/L (ref 135–145)
WBC # BLD AUTO: 7.9 THOU/MM3 (ref 4.8–10.8)

## 2024-05-08 PROCEDURE — 90935 HEMODIALYSIS ONE EVALUATION: CPT

## 2024-05-08 PROCEDURE — 82948 REAGENT STRIP/BLOOD GLUCOSE: CPT

## 2024-05-08 PROCEDURE — 85027 COMPLETE CBC AUTOMATED: CPT

## 2024-05-08 PROCEDURE — 80048 BASIC METABOLIC PNL TOTAL CA: CPT

## 2024-05-08 PROCEDURE — 36415 COLL VENOUS BLD VENIPUNCTURE: CPT

## 2024-05-08 PROCEDURE — 99239 HOSP IP/OBS DSCHRG MGMT >30: CPT | Performed by: STUDENT IN AN ORGANIZED HEALTH CARE EDUCATION/TRAINING PROGRAM

## 2024-05-08 PROCEDURE — 90935 HEMODIALYSIS ONE EVALUATION: CPT | Performed by: INTERNAL MEDICINE

## 2024-05-08 PROCEDURE — 6370000000 HC RX 637 (ALT 250 FOR IP)

## 2024-05-08 RX ORDER — CEPHALEXIN 500 MG/1
500 CAPSULE ORAL 4 TIMES DAILY
Qty: 12 CAPSULE | Refills: 0 | Status: SHIPPED | OUTPATIENT
Start: 2024-05-08 | End: 2024-05-11

## 2024-05-08 RX ADMIN — MEGESTROL ACETATE 40 MG: 40 TABLET ORAL at 11:32

## 2024-05-08 RX ADMIN — Medication 1000 MCG: at 11:31

## 2024-05-08 RX ADMIN — FLUTICASONE PROPIONATE 2 SPRAY: 50 SPRAY, METERED NASAL at 11:32

## 2024-05-08 RX ADMIN — FOLIC ACID 500 MCG: 1 TABLET ORAL at 11:32

## 2024-05-08 RX ADMIN — ATORVASTATIN CALCIUM 10 MG: 10 TABLET, FILM COATED ORAL at 11:32

## 2024-05-08 RX ADMIN — BUPROPION HYDROCHLORIDE 150 MG: 150 TABLET, EXTENDED RELEASE ORAL at 11:32

## 2024-05-08 RX ADMIN — SEVELAMER CARBONATE 800 MG: 800 TABLET, FILM COATED ORAL at 11:32

## 2024-05-08 RX ADMIN — SERTRALINE 50 MG: 50 TABLET, FILM COATED ORAL at 11:32

## 2024-05-08 RX ADMIN — AMLODIPINE BESYLATE 5 MG: 5 TABLET ORAL at 11:32

## 2024-05-08 RX ADMIN — CLOPIDOGREL BISULFATE 75 MG: 75 TABLET ORAL at 11:32

## 2024-05-08 NOTE — PLAN OF CARE
Problem: Discharge Planning  Goal: Discharge to home or other facility with appropriate resources  Outcome: Progressing  Flowsheets  Taken 5/6/2024 2255 by Liliana Lew RN  Discharge to home or other facility with appropriate resources:   Identify barriers to discharge with patient and caregiver   Arrange for needed discharge resources and transportation as appropriate   Identify discharge learning needs (meds, wound care, etc)    Problem: Safety - Adult  Goal: Free from fall injury  Outcome: Progressing  Flowsheets (Taken 5/6/2024 2255)  Free From Fall Injury: Instruct family/caregiver on patient safety     Problem: ABCDS Injury Assessment  Goal: Absence of physical injury  Outcome: Progressing  Flowsheets (Taken 5/6/2024 2255)  Absence of Physical Injury: Implement safety measures based on patient assessment     Problem: Skin/Tissue Integrity  Goal: Absence of new skin breakdown  Description: 1.  Monitor for areas of redness and/or skin breakdown  2.  Assess vascular access sites hourly  3.  Every 4-6 hours minimum:  Change oxygen saturation probe site  4.  Every 4-6 hours:  If on nasal continuous positive airway pressure, respiratory therapy assess nares and determine need for appliance change or resting period.  Outcome: Progressing     Problem: Neurosensory - Adult  Goal: Achieves stable or improved neurological status  Outcome: Progressing  Flowsheets (Taken 5/6/2024 2255)  Achieves stable or improved neurological status: Assess for and report changes in neurological status     Problem: Musculoskeletal - Adult  Goal: Return mobility to safest level of function  Outcome: Progressing  Flowsheets (Taken 5/6/2024 2255)  Return Mobility to Safest Level of Function:   Assess patient stability and activity tolerance for standing, transferring and ambulating with or without assistive devices   Assist with transfers and ambulation using safe patient handling equipment as needed  Goal: Return ADL status to a safe 
assistive devices   Assist with transfers and ambulation using safe patient handling equipment as needed  Goal: Return ADL status to a safe level of function  Outcome: Progressing  Flowsheets (Taken 5/7/2024 2208)  Return ADL Status to a Safe Level of Function: Assess activities of daily living deficits and provide assistive devices as needed     Problem: Chronic Conditions and Co-morbidities  Goal: Patient's chronic conditions and co-morbidity symptoms are monitored and maintained or improved  Outcome: Progressing  Flowsheets (Taken 5/7/2024 2208)  Care Plan - Patient's Chronic Conditions and Co-Morbidity Symptoms are Monitored and Maintained or Improved:   Monitor and assess patient's chronic conditions and comorbid symptoms for stability, deterioration, or improvement   Collaborate with multidisciplinary team to address chronic and comorbid conditions and prevent exacerbation or deterioration   Update acute care plan with appropriate goals if chronic or comorbid symptoms are exacerbated and prevent overall improvement and discharge     Care plan reviewed with patient.  Patient verbalizes understanding of the care plan and contributed to goal setting.

## 2024-05-08 NOTE — PROGRESS NOTES
Discharge teaching and instructions for diagnosis/procedure of uti completed with patient using teachback method. AVS reviewed. Printed prescriptions given to patient. Patient voiced understanding regarding prescriptions, follow up appointments, and care of self at home. Discharged ambulatory and in a wheelchair to  home with support per family.

## 2024-05-08 NOTE — CARE COORDINATION
05/07/24 1353   Readmission Assessment   Number of Days since last admission? 8-30 days   Previous Disposition Home with Family   Who is being Interviewed Caregiver  (HD nurse instructed them to come to ED r/t confusion)   What was the patient's/caregiver's perception as to why they think they needed to return back to the hospital? Other (Comment)   Did you visit your Primary Care Physician after you left the hospital, before you returned this time? Yes   Did you see a specialist, such as Cardiac, Pulmonary, Orthopedic Physician, etc. after you left the hospital? Yes   Who advised the patient to return to the hospital? Other (Comment)  (HD)   Does the patient report anything that got in the way of taking their medications? No   What reasons did they give? Other (Comment)  (n/a)   In our efforts to provide the best possible care to you and others like you, can you think of anything that we could have done to help you after you left the hospital the first time, so that you might not have needed to return so soon? Other (Comment)  (Reports no fault of physicians or hospital but all test are negative and we cannot figure out reason for confusion.)       
5/8/24, 11:50 AM EDT    Discharge ordered. Met with Deloris, she plans to return home with her , and resume OP therapy at [facility]. Denies all needs.   Patient goals/plan/ treatment preferences discussed by  and .  Patient goals/plan/ treatment preferences reviewed with patient/ family.  Patient/ family verbalize understanding of discharge plan and are in agreement with goal/plan/treatment preferences.  Understanding was demonstrated using the teach back method.  AVS provided by RN at time of discharge, which includes all necessary medical information pertaining to the patients current course of illness, treatment, post-discharge goals of care, and treatment preferences.     Services At/After Discharge: None         
  Can patient return to prior living arrangement Yes   Ability to make needs known: Fair   Family able to assist with home care needs: Yes   Would you like for me to discuss the discharge plan with any other family members/significant others, and if so, who? Yes  (patient confused, spoke with .)   Financial Resources Medicare   Community Resources Other (Comment)  (OP therapy)   Social/Functional History   Active  No   Patient's  Info    Discharge Planning   Type of Residence House   Living Arrangements Spouse/Significant Other   Current Services Prior To Admission Other (Comment)  (OP therapy)   Current DME Prior to Arrival Walker;Shower Chair   Potential Assistance Needed N/A   DME Ordered? No   Potential Assistance Purchasing Medications No   Type of Home Care Services None   Patient expects to be discharged to: House   Services At/After Discharge   Transition of Care Consult (CM Consult) Discharge Planning   Services At/After Discharge PT;OT   Mode of Transport at Discharge Other (see comment)  ()   Confirm Follow Up Transport Family

## 2024-05-08 NOTE — FLOWSHEET NOTE
05/08/24 0745 05/08/24 1036   Vital Signs   BP (!) 147/63 (!) 132/93   Temp 97.3 °F (36.3 °C) 97.1 °F (36.2 °C)   Pulse 93 92   Respirations 18 18   SpO2 97 % 96 %   Weight - Scale 40.6 kg (89 lb 8.1 oz) 40.1 kg (88 lb 6.5 oz)   Weight Method Bed scale Bed scale   Percent Weight Change -7.31 -1.23   Post-Hemodialysis Assessment   Post-Treatment Procedures  --  Blood returned;Catheter Capped, clamped with Saline x2 ports   Machine Disinfection Process  --  Acid/Vinegar Clean;Heat Disinfect;Exterior Machine Disinfection   Blood Volume Processed (Liters)  --  60.1 L   Dialyzer Clearance  --  Lightly streaked   Duration of Treatment (minutes)  --  150 minutes   Heparin Amount Administered During Treatment (mL)  --  0 mL   Hemodialysis Intake (ml)  --  400 ml   Hemodialysis Output (ml)  --  900 ml   NET Removed (ml)  --  500   Tolerated Treatment  --  Good     Stable 2.5 hour TX completed. Removed 500 ml of fluid. pt tolerated fluid removal well. Bilateral cath ports flushed with normal saline, clamped, and secured with tegos. CVC drsg clean, dry, and intact. Report called to primary RN. TX record printed for scanning into EMR.

## 2024-05-08 NOTE — DISCHARGE SUMMARY
Resident Discharge Summary (Hospitalist)      Patient Identification:   Deloris Watts   : 1955  MRN: [id number]   Account: [id number]   Patient's PCP: Johana Weldon MD    Admit Date: 2024   Discharge Date: 2024      Admitting Physician: No admitting provider for patient encounter.  Discharge Physician: Doc Reyes DO       Discharge Diagnoses:  Acute encephalopathy, improving: I suspect that this is constipation and polypharmacy related in the setting of ESRD.  No hypoxia, dysuria.  No history of cirrhosis or alcohol abuse.  CT head shows no acute intracranial hemorrhage, mass effect, or midline shift.  Chest x-ray shows no interval change since previous study dated 4/10/2024, no acute cardiopulmonary disease.  No leukocytosis.  No reported seizures.  Did miss dialysis treatment.  Glucose level 85.  Ammonia level 24.  TSH within normal limits, anemia panel WNL B12 and folate WNL.  Urine cultures positive for gram-negative bacilli however asymptomatic at this time I suspect that this is colonization.  Nephrology consulted for dialysis as this can be uremic encephalopathy  Started on bowel regimen, I suspect that this is AMS secondary to constipation  Discontinue trazodone, tramadol, Singulair, Zyrtec.  Will give Keflex for 3 days.  ESRD on HD on : Access point tunneled dialysis catheter.  Patient stated in the past that she did not want fistula.  Follows with Dr. Wong on sevelamer.  Not on EPO.  Not on midodrine.  Continue to follow with nephrology.  High anion gap metabolic acidosis, resolved: Resolved with dialysis  Elevated troponins: Likely in the second of ESRD and missed dialysis treatment.  Within her baseline.  She denies any chest pain.  Normocytic anemia: Presented with Hgb 8.7, hematocrit 27.6.  MCV 99.3 however previous documentation shows macrocytic anemia.  Will discharge with cyanocobalamin   Hypertension: Continue amlodipine  History of

## 2024-05-08 NOTE — PROGRESS NOTES
[facility]   Nephrology progress note  5/8/2024, 8:35 AM      Pt Name:    Deloris Watts  MRN:     [id number]     YOB: 1955  Admit Date:    5/6/2024  3:22 PM    Chief Complaint: Nephrology following for ESRD on hemodialysis.    Subjective:  Patient seen and examined  No chest pain or shortness of breath  Still clearly confused but slightly better than yesterday    Objective:  24HR INTAKE/OUTPUT:    Intake/Output Summary (Last 24 hours) at 5/8/2024 0835  Last data filed at 5/8/2024 0627  Gross per 24 hour   Intake 100 ml   Output 500 ml   Net -400 ml      Admission weight: 44.5 kg (98 lb)  Wt Readings from Last 3 Encounters:   05/07/24 43.8 kg (96 lb 9 oz)   05/01/24 44.1 kg (97 lb 3.6 oz)   04/16/24 44.8 kg (98 lb 12.8 oz)        Vitals :   Vitals:    05/07/24 1633 05/07/24 2026 05/07/24 2346 05/08/24 0345   BP: (!) 170/72 (!) 160/82 (!) 148/75 136/79   Pulse: 96 97 98 98   Resp: 17 18 18 16   Temp: 97.8 °F (36.6 °C) 99.5 °F (37.5 °C) 98.2 °F (36.8 °C)    TempSrc: Oral Oral Oral    SpO2: 94% 97% 97% 98%   Weight:       Height:           Physical examination  General Appearance: Cachectic ill nourished no distress  Mouth/Throat:  Oral mucosa moist  Neck:  Supple, no JVD  Lungs:  Breath sounds: clear  Heart::  S1,S2 heard  Abdomen:  Soft, non - tender  Musculoskeletal:  Edema -no edema noted    Medications:  Infusion:    sodium chloride      dextrose       Meds:    vitamin B-12  1,000 mcg Oral Daily    sodium chloride flush  5-40 mL IntraVENous 2 times per day    heparin (porcine)  5,000 Units SubCUTAneous BID    amLODIPine  5 mg Oral Daily    buPROPion  150 mg Oral QAM    buPROPion  300 mg Oral Nightly    clopidogrel  75 mg Oral Daily    docusate sodium  100 mg Oral TID    fluticasone  2 spray Each Nostril Daily    folic acid  500 mcg Oral Daily    megestrol  40 mg Oral Daily    sertraline  50 mg Oral Daily    sevelamer  800 mg Oral BID with meals    atorvastatin  10 mg Oral Daily

## 2024-05-09 ENCOUNTER — TELEPHONE (OUTPATIENT)
Dept: FAMILY MEDICINE CLINIC | Age: 69
End: 2024-05-09

## 2024-05-09 ENCOUNTER — COMMUNITY CARE MANAGEMENT (OUTPATIENT)
Facility: CLINIC | Age: 69
End: 2024-05-09

## 2024-05-09 NOTE — TELEPHONE ENCOUNTER
----- Message from Johana Weldon MD sent at 5/9/2024 12:38 PM EDT -----  Regarding: MAURA call  Patient was d/c on 5/8.  Please do MAURA call.     NDRT called to speak with MOB and FOB regarding  delivery. Detailed delivery arrangements and nicu admission procedures, briefly explained visitation guidelines pardeep. Regarding siblings, answered questions. MOB and FOB stated that they understood and would call with any further questions.

## 2024-05-10 ENCOUNTER — HOSPITAL ENCOUNTER (OUTPATIENT)
Dept: GENERAL RADIOLOGY | Age: 69
Discharge: HOME | End: 2024-05-10
Payer: MEDICARE

## 2024-05-10 ENCOUNTER — HOSPITAL ENCOUNTER (OUTPATIENT)
Age: 69
Discharge: HOME | End: 2024-05-10
Payer: MEDICARE

## 2024-05-10 DIAGNOSIS — N20.0 KIDNEY STONE: ICD-10-CM

## 2024-05-10 PROCEDURE — 74018 RADEX ABDOMEN 1 VIEW: CPT

## 2024-05-13 ENCOUNTER — OFFICE VISIT (OUTPATIENT)
Dept: UROLOGY | Age: 69
End: 2024-05-13
Payer: MEDICARE

## 2024-05-13 VITALS — BODY MASS INDEX: 17.28 KG/M2 | WEIGHT: 88 LBS | HEIGHT: 60 IN | RESPIRATION RATE: 18 BRPM

## 2024-05-13 DIAGNOSIS — N30.00 ACUTE CYSTITIS WITHOUT HEMATURIA: ICD-10-CM

## 2024-05-13 DIAGNOSIS — R31.29 MICROHEMATURIA: ICD-10-CM

## 2024-05-13 DIAGNOSIS — G93.40 ENCEPHALOPATHY: ICD-10-CM

## 2024-05-13 DIAGNOSIS — R30.0 DYSURIA: ICD-10-CM

## 2024-05-13 DIAGNOSIS — N20.0 KIDNEY STONE: Primary | ICD-10-CM

## 2024-05-13 DIAGNOSIS — R35.0 URINARY FREQUENCY: ICD-10-CM

## 2024-05-13 LAB
BILIRUBIN, URINE: NEGATIVE
BLOOD URINE, POC: ABNORMAL
CHARACTER, URINE: ABNORMAL
COLOR: YELLOW
GLUCOSE URINE: 250 MG/DL
KETONES, URINE: NEGATIVE
LEUKOCYTE CLUMPS, URINE: NEGATIVE
NITRITE, URINE: NEGATIVE
PH, URINE: 8.5 (ref 5–9)
PROTEIN, URINE: 100 MG/DL
SPECIFIC GRAVITY UA: 1.02 (ref 1–1.03)
UROBILINOGEN, URINE: 0.2 EU/DL (ref 0–1)

## 2024-05-13 PROCEDURE — 1036F TOBACCO NON-USER: CPT | Performed by: NURSE PRACTITIONER

## 2024-05-13 PROCEDURE — 81003 URINALYSIS AUTO W/O SCOPE: CPT | Performed by: NURSE PRACTITIONER

## 2024-05-13 PROCEDURE — 1111F DSCHRG MED/CURRENT MED MERGE: CPT | Performed by: NURSE PRACTITIONER

## 2024-05-13 PROCEDURE — 3017F COLORECTAL CA SCREEN DOC REV: CPT | Performed by: NURSE PRACTITIONER

## 2024-05-13 PROCEDURE — 99204 OFFICE O/P NEW MOD 45 MIN: CPT | Performed by: NURSE PRACTITIONER

## 2024-05-13 PROCEDURE — 1090F PRES/ABSN URINE INCON ASSESS: CPT | Performed by: NURSE PRACTITIONER

## 2024-05-13 PROCEDURE — G8399 PT W/DXA RESULTS DOCUMENT: HCPCS | Performed by: NURSE PRACTITIONER

## 2024-05-13 PROCEDURE — G8427 DOCREV CUR MEDS BY ELIG CLIN: HCPCS | Performed by: NURSE PRACTITIONER

## 2024-05-13 PROCEDURE — G8419 CALC BMI OUT NRM PARAM NOF/U: HCPCS | Performed by: NURSE PRACTITIONER

## 2024-05-13 PROCEDURE — 1123F ACP DISCUSS/DSCN MKR DOCD: CPT | Performed by: NURSE PRACTITIONER

## 2024-05-13 RX ORDER — CEPHALEXIN 500 MG/1
500 CAPSULE ORAL 4 TIMES DAILY
COMMUNITY

## 2024-05-13 RX ORDER — DOXYCYCLINE HYCLATE 100 MG
100 TABLET ORAL 2 TIMES DAILY
Qty: 20 TABLET | Refills: 0 | Status: SHIPPED | OUTPATIENT
Start: 2024-05-13 | End: 2024-05-23

## 2024-05-13 ASSESSMENT — ENCOUNTER SYMPTOMS
SHORTNESS OF BREATH: 0
COUGH: 0
ABDOMINAL PAIN: 1

## 2024-05-13 NOTE — PATIENT INSTRUCTIONS
-Start doxycyclione twice a day for 10 days.  -If okay with neprhology Start D-mannose 500mg daily, cranberry 500mg twice a day, and probiotic daily.  -Consume plenty of fluids, >60 oz per day   -Recommend lifestyle changes including timed voiding, double voiding, and Credee maneuver if having difficulty emptying the bladder  -avoid saturating pads  -avoid constipation, recommend bowel regimen   -maintain good control of blood sugar   -Can consider guidance testing  -Discussed possible post-coital antibiotics, daily prophylaxis, and self-start regimen. Can also consider estrace cream in the future.   -Discussed need for cystoscopy and  imaging should symptoms/UTI persist     If confusion does not get better or worsens she should go to ER.

## 2024-05-13 NOTE — PROGRESS NOTES
[facility] [place] [facility]  [facility]  [address]  [address]  [facility] [address]  Dept: [phone]  Loc: [phone]    Visit Date: 5/13/2024        HPI:   Patient presents to urology clinic for follow-up of UTI, kidney stones. Hx of left urs with hll. Receiving dialysis M-W-F.     Deloris was seen in ER 2/13/2024 with c/o emesis and urinary frequency. She was recently on Macrobid for UTI. Urine culture with no growth. U/a negative for blood. WBC 3.9, Hgb 10.6.     2/22/24 urine has moderate leuks. Culture mixed growth. Denies flank pain, suprapubic pressure, gross hematuria, dysuria, fever or chills.  She does have some urinary frequency. She reports constipation, discussed contributing to urinary symptoms.     Urine cx Mixed growth 2/22/24. Given macrobid for 10 days.   UA 4/4/24 negative.    UA 4/11/24 negative.   Urine 5/6/24 Ecoli.  Given keflex for 3 days 5/8/24.      KUB 4/16/24 Constipation.  Stable appearing left renal calculi.    KUB 5/10/24 Constipation.  No stones seen but limited due to constipation.     Just discharged from [facility] 5/6/24 to 5/8/24.  Treated for encephalopathy and UTI.     She is seen today 5/13/24.  She complains of dysuria.   +frequency, urgency.  Now having urinary inc small amount and large amount.  Wears 7 to 8 pads per day.  This is new in past 2 weeks.  No hematuria.  Has suprapubic tenderness.  No flank pain.  Was just discharged with encephalopathy.  Pt and  report it is same as when she was discharged.  No fevers, chills.   Bm about every 2 days.  UA trace blood.  PVR 45ml.  She has urinary symptoms that never improved with keflex and they still feel mentation never improved.     Current Outpatient Medications   Medication Sig Dispense Refill    cephALEXin (KEFLEX) 500 MG capsule Take 1 capsule by mouth 4 times daily      doxycycline hyclate (VIBRA-TABS) 100 MG tablet Take 1 tablet by mouth 2 times daily for 10 days 20 tablet 0

## 2024-05-15 LAB
BACTERIA UR CULT: ABNORMAL
ORGANISM: ABNORMAL

## 2024-05-25 ENCOUNTER — HOSPITAL ENCOUNTER (INPATIENT)
Age: 69
LOS: 3 days | Discharge: NURSING, REHAB OR LONG-TERM CARE | DRG: 085 | End: 2024-05-29
Attending: EMERGENCY MEDICINE | Admitting: SURGERY
Payer: MEDICARE

## 2024-05-25 DIAGNOSIS — I62.00 SUBDURAL HEMORRHAGE (HCC): Primary | ICD-10-CM

## 2024-05-25 PROCEDURE — 99285 EMERGENCY DEPT VISIT HI MDM: CPT

## 2024-05-25 ASSESSMENT — PAIN SCALES - WONG BAKER: WONGBAKER_NUMERICALRESPONSE: 6;8

## 2024-05-25 ASSESSMENT — PAIN - FUNCTIONAL ASSESSMENT: PAIN_FUNCTIONAL_ASSESSMENT: WONG-BAKER FACES

## 2024-05-26 ENCOUNTER — APPOINTMENT (OUTPATIENT)
Dept: CT IMAGING | Age: 69
DRG: 085 | End: 2024-05-26
Payer: MEDICARE

## 2024-05-26 ENCOUNTER — APPOINTMENT (OUTPATIENT)
Dept: GENERAL RADIOLOGY | Age: 69
DRG: 085 | End: 2024-05-26
Payer: MEDICARE
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
## 2024-05-26 PROBLEM — S06.5XAA SDH (SUBDURAL HEMATOMA) (HCC): Status: ACTIVE | Noted: 2024-05-26
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
## 2024-05-26 PROBLEM — I62.00 SUBDURAL HEMORRHAGE (HCC): Status: ACTIVE | Noted: 2024-05-26

## 2024-05-26 LAB
ALBUMIN SERPL BCG-MCNC: 3.6 G/DL (ref 3.5–5.1)
ALP SERPL-CCNC: 160 U/L (ref 38–126)
ALT SERPL W/O P-5'-P-CCNC: 11 U/L (ref 11–66)
ANION GAP SERPL CALC-SCNC: 13 MEQ/L (ref 8–16)
ANION GAP SERPL CALC-SCNC: 18 MEQ/L (ref 8–16)
ANISOCYTOSIS BLD QL SMEAR: PRESENT
APTT PPP: 27.3 SECONDS (ref 22–38)
AST SERPL-CCNC: 23 U/L (ref 5–40)
BASOPHILS ABSOLUTE: 0.1 THOU/MM3 (ref 0–0.1)
BASOPHILS NFR BLD AUTO: 0.8 %
BILIRUB SERPL-MCNC: 0.6 MG/DL (ref 0.3–1.2)
BUN SERPL-MCNC: 31 MG/DL (ref 7–22)
BUN SERPL-MCNC: 33 MG/DL (ref 7–22)
CA-I BLD ISE-SCNC: 1.09 MMOL/L (ref 1.12–1.32)
CALCIUM SERPL-MCNC: 8.6 MG/DL (ref 8.5–10.5)
CALCIUM SERPL-MCNC: 8.8 MG/DL (ref 8.5–10.5)
CHLORIDE SERPL-SCNC: 101 MEQ/L (ref 98–111)
CHLORIDE SERPL-SCNC: 98 MEQ/L (ref 98–111)
CO2 SERPL-SCNC: 21 MEQ/L (ref 23–33)
CO2 SERPL-SCNC: 24 MEQ/L (ref 23–33)
CREAT SERPL-MCNC: 4.6 MG/DL (ref 0.4–1.2)
CREAT SERPL-MCNC: 5 MG/DL (ref 0.4–1.2)
DEPRECATED RDW RBC AUTO: 62 FL (ref 35–45)
DEPRECATED RDW RBC AUTO: 68 FL (ref 35–45)
EKG ATRIAL RATE: 82 BPM
EKG P AXIS: -6 DEGREES
EKG P-R INTERVAL: 140 MS
EKG Q-T INTERVAL: 420 MS
EKG QRS DURATION: 126 MS
EKG QTC CALCULATION (BAZETT): 490 MS
EKG R AXIS: 91 DEGREES
EKG T AXIS: 54 DEGREES
EKG VENTRICULAR RATE: 82 BPM
EOSINOPHIL NFR BLD AUTO: 1.3 %
EOSINOPHILS ABSOLUTE: 0.1 THOU/MM3 (ref 0–0.4)
ERYTHROCYTE [DISTWIDTH] IN BLOOD BY AUTOMATED COUNT: 16.6 % (ref 11.5–14.5)
ERYTHROCYTE [DISTWIDTH] IN BLOOD BY AUTOMATED COUNT: 17 % (ref 11.5–14.5)
ETHANOL SERPL-MCNC: < 0.01 % (ref 0–0.08)
GFR SERPL CREATININE-BSD FRML MDRD: 10 ML/MIN/1.73M2
GFR SERPL CREATININE-BSD FRML MDRD: 9 ML/MIN/1.73M2
GLUCOSE BLD STRIP.AUTO-MCNC: 84 MG/DL (ref 70–108)
GLUCOSE SERPL-MCNC: 69 MG/DL (ref 70–108)
GLUCOSE SERPL-MCNC: 93 MG/DL (ref 70–108)
HCT VFR BLD AUTO: 40 % (ref 37–47)
HCT VFR BLD AUTO: 44.3 % (ref 37–47)
HGB BLD-MCNC: 12.5 GM/DL (ref 12–16)
HGB BLD-MCNC: 12.9 GM/DL (ref 12–16)
IMM GRANULOCYTES # BLD AUTO: 0.05 THOU/MM3 (ref 0–0.07)
IMM GRANULOCYTES NFR BLD AUTO: 0.8 %
INR PPP: 0.85 (ref 0.85–1.13)
LYMPHOCYTES ABSOLUTE: 1.2 THOU/MM3 (ref 1–4.8)
LYMPHOCYTES NFR BLD AUTO: 18.4 %
MAGNESIUM SERPL-MCNC: 2.2 MG/DL (ref 1.6–2.4)
MCH RBC QN AUTO: 31.6 PG (ref 26–33)
MCH RBC QN AUTO: 31.9 PG (ref 26–33)
MCHC RBC AUTO-ENTMCNC: 29.1 GM/DL (ref 32.2–35.5)
MCHC RBC AUTO-ENTMCNC: 31.3 GM/DL (ref 32.2–35.5)
MCV RBC AUTO: 101.3 FL (ref 81–99)
MCV RBC AUTO: 109.7 FL (ref 81–99)
MONOCYTES ABSOLUTE: 0.7 THOU/MM3 (ref 0.4–1.3)
MONOCYTES NFR BLD AUTO: 10.5 %
MRSA DNA SPEC QL NAA+PROBE: NEGATIVE
NEUTROPHILS ABSOLUTE: 4.3 THOU/MM3 (ref 1.8–7.7)
NEUTROPHILS NFR BLD AUTO: 68.2 %
NRBC BLD AUTO-RTO: 0 /100 WBC
OSMOLALITY SERPL CALC.SUM OF ELEC: 280.1 MOSMOL/KG (ref 275–300)
PHOSPHATE SERPL-MCNC: 5.6 MG/DL (ref 2.4–4.7)
PLATELET # BLD AUTO: 213 THOU/MM3 (ref 130–400)
PLATELET # BLD AUTO: 240 THOU/MM3 (ref 130–400)
PLATELET BLD QL SMEAR: ADEQUATE
PMV BLD AUTO: 11.1 FL (ref 9.4–12.4)
PMV BLD AUTO: 11.3 FL (ref 9.4–12.4)
POIKILOCYTES: ABNORMAL
POTASSIUM SERPL-SCNC: 3.3 MEQ/L (ref 3.5–5.2)
POTASSIUM SERPL-SCNC: 3.9 MEQ/L (ref 3.5–5.2)
POTASSIUM SERPL-SCNC: 3.9 MEQ/L (ref 3.5–5.2)
PROT SERPL-MCNC: 5.9 G/DL (ref 6.1–8)
RBC # BLD AUTO: 3.95 MILL/MM3 (ref 4.2–5.4)
RBC # BLD AUTO: 4.04 MILL/MM3 (ref 4.2–5.4)
SCAN OF BLOOD SMEAR: NORMAL
SODIUM SERPL-SCNC: 137 MEQ/L (ref 135–145)
SODIUM SERPL-SCNC: 138 MEQ/L (ref 135–145)
TARGETS BLD QL SMEAR: ABNORMAL
TROPONIN, HIGH SENSITIVITY: 58 NG/L (ref 0–12)
WBC # BLD AUTO: 6.3 THOU/MM3 (ref 4.8–10.8)
WBC # BLD AUTO: 6.4 THOU/MM3 (ref 4.8–10.8)

## 2024-05-26 PROCEDURE — 70450 CT HEAD/BRAIN W/O DYE: CPT

## 2024-05-26 PROCEDURE — 2580000003 HC RX 258: Performed by: PHYSICIAN ASSISTANT

## 2024-05-26 PROCEDURE — 80053 COMPREHEN METABOLIC PANEL: CPT

## 2024-05-26 PROCEDURE — 93010 ELECTROCARDIOGRAM REPORT: CPT | Performed by: INTERNAL MEDICINE

## 2024-05-26 PROCEDURE — 2000000000 HC ICU R&B

## 2024-05-26 PROCEDURE — 6370000000 HC RX 637 (ALT 250 FOR IP): Performed by: PHYSICIAN ASSISTANT

## 2024-05-26 PROCEDURE — 6370000000 HC RX 637 (ALT 250 FOR IP)

## 2024-05-26 PROCEDURE — 99223 1ST HOSP IP/OBS HIGH 75: CPT | Performed by: NEUROLOGICAL SURGERY

## 2024-05-26 PROCEDURE — 72170 X-RAY EXAM OF PELVIS: CPT

## 2024-05-26 PROCEDURE — 2500000003 HC RX 250 WO HCPCS: Performed by: EMERGENCY MEDICINE

## 2024-05-26 PROCEDURE — 99222 1ST HOSP IP/OBS MODERATE 55: CPT | Performed by: INTERNAL MEDICINE

## 2024-05-26 PROCEDURE — 85610 PROTHROMBIN TIME: CPT

## 2024-05-26 PROCEDURE — 84484 ASSAY OF TROPONIN QUANT: CPT

## 2024-05-26 PROCEDURE — 82948 REAGENT STRIP/BLOOD GLUCOSE: CPT

## 2024-05-26 PROCEDURE — 85027 COMPLETE CBC AUTOMATED: CPT

## 2024-05-26 PROCEDURE — 82077 ASSAY SPEC XCP UR&BREATH IA: CPT

## 2024-05-26 PROCEDURE — 71045 X-RAY EXAM CHEST 1 VIEW: CPT

## 2024-05-26 PROCEDURE — 85730 THROMBOPLASTIN TIME PARTIAL: CPT

## 2024-05-26 PROCEDURE — 83735 ASSAY OF MAGNESIUM: CPT

## 2024-05-26 PROCEDURE — 36415 COLL VENOUS BLD VENIPUNCTURE: CPT

## 2024-05-26 PROCEDURE — 84100 ASSAY OF PHOSPHORUS: CPT

## 2024-05-26 PROCEDURE — 72125 CT NECK SPINE W/O DYE: CPT

## 2024-05-26 PROCEDURE — 97162 PT EVAL MOD COMPLEX 30 MIN: CPT

## 2024-05-26 PROCEDURE — 85025 COMPLETE CBC W/AUTO DIFF WBC: CPT

## 2024-05-26 PROCEDURE — 82330 ASSAY OF CALCIUM: CPT

## 2024-05-26 PROCEDURE — 73552 X-RAY EXAM OF FEMUR 2/>: CPT

## 2024-05-26 PROCEDURE — 84132 ASSAY OF SERUM POTASSIUM: CPT

## 2024-05-26 PROCEDURE — 6360000002 HC RX W HCPCS

## 2024-05-26 PROCEDURE — 99223 1ST HOSP IP/OBS HIGH 75: CPT | Performed by: INTERNAL MEDICINE

## 2024-05-26 PROCEDURE — 87641 MR-STAPH DNA AMP PROBE: CPT

## 2024-05-26 PROCEDURE — 92610 EVALUATE SWALLOWING FUNCTION: CPT

## 2024-05-26 PROCEDURE — 93005 ELECTROCARDIOGRAM TRACING: CPT | Performed by: EMERGENCY MEDICINE

## 2024-05-26 PROCEDURE — 2580000003 HC RX 258

## 2024-05-26 PROCEDURE — 97530 THERAPEUTIC ACTIVITIES: CPT

## 2024-05-26 PROCEDURE — 6360000002 HC RX W HCPCS: Performed by: EMERGENCY MEDICINE

## 2024-05-26 RX ORDER — TRANEXAMIC ACID 10 MG/ML
1000 INJECTION, SOLUTION INTRAVENOUS ONCE
Status: COMPLETED | OUTPATIENT
Start: 2024-05-26 | End: 2024-05-26

## 2024-05-26 RX ORDER — SODIUM CHLORIDE 9 MG/ML
INJECTION, SOLUTION INTRAVENOUS PRN
Status: DISCONTINUED | OUTPATIENT
Start: 2024-05-26 | End: 2024-05-29 | Stop reason: HOSPADM

## 2024-05-26 RX ORDER — PRIMIDONE 50 MG/1
50 TABLET ORAL DAILY
Status: DISCONTINUED | OUTPATIENT
Start: 2024-05-26 | End: 2024-05-29 | Stop reason: HOSPADM

## 2024-05-26 RX ORDER — SODIUM CHLORIDE 9 MG/ML
INJECTION, SOLUTION INTRAVENOUS CONTINUOUS
Status: DISCONTINUED | OUTPATIENT
Start: 2024-05-26 | End: 2024-05-26

## 2024-05-26 RX ORDER — FENTANYL CITRATE 50 UG/ML
25 INJECTION, SOLUTION INTRAMUSCULAR; INTRAVENOUS ONCE
Status: COMPLETED | OUTPATIENT
Start: 2024-05-26 | End: 2024-05-26

## 2024-05-26 RX ORDER — FLUTICASONE PROPIONATE 50 MCG
2 SPRAY, SUSPENSION (ML) NASAL DAILY
Status: DISCONTINUED | OUTPATIENT
Start: 2024-05-27 | End: 2024-05-29 | Stop reason: HOSPADM

## 2024-05-26 RX ORDER — DOCUSATE SODIUM 100 MG/1
100 CAPSULE, LIQUID FILLED ORAL 3 TIMES DAILY
Status: DISCONTINUED | OUTPATIENT
Start: 2024-05-26 | End: 2024-05-29 | Stop reason: HOSPADM

## 2024-05-26 RX ORDER — BUPROPION HYDROCHLORIDE 300 MG/1
300 TABLET ORAL NIGHTLY
Status: DISCONTINUED | OUTPATIENT
Start: 2024-05-26 | End: 2024-05-29 | Stop reason: HOSPADM

## 2024-05-26 RX ORDER — BUPROPION HYDROCHLORIDE 150 MG/1
150 TABLET ORAL EVERY MORNING
Status: DISCONTINUED | OUTPATIENT
Start: 2024-05-26 | End: 2024-05-29 | Stop reason: HOSPADM

## 2024-05-26 RX ORDER — BISACODYL 10 MG
10 SUPPOSITORY, RECTAL RECTAL DAILY PRN
Status: DISCONTINUED | OUTPATIENT
Start: 2024-05-26 | End: 2024-05-29 | Stop reason: HOSPADM

## 2024-05-26 RX ORDER — POLYETHYLENE GLYCOL 3350 17 G/17G
17 POWDER, FOR SOLUTION ORAL DAILY
Status: DISCONTINUED | OUTPATIENT
Start: 2024-05-26 | End: 2024-05-29 | Stop reason: HOSPADM

## 2024-05-26 RX ORDER — GLUCAGON 1 MG/ML
1 KIT INJECTION PRN
Status: DISCONTINUED | OUTPATIENT
Start: 2024-05-26 | End: 2024-05-29 | Stop reason: HOSPADM

## 2024-05-26 RX ORDER — AMLODIPINE BESYLATE 5 MG/1
5 TABLET ORAL DAILY
Status: DISCONTINUED | OUTPATIENT
Start: 2024-05-26 | End: 2024-05-29 | Stop reason: HOSPADM

## 2024-05-26 RX ORDER — HYDRALAZINE HYDROCHLORIDE 20 MG/ML
INJECTION INTRAMUSCULAR; INTRAVENOUS
Status: DISPENSED
Start: 2024-05-26 | End: 2024-05-26

## 2024-05-26 RX ORDER — ONDANSETRON 4 MG/1
4 TABLET, ORALLY DISINTEGRATING ORAL 3 TIMES DAILY PRN
Status: DISCONTINUED | OUTPATIENT
Start: 2024-05-26 | End: 2024-05-29 | Stop reason: HOSPADM

## 2024-05-26 RX ORDER — POTASSIUM CHLORIDE 20 MEQ/1
40 TABLET, EXTENDED RELEASE ORAL ONCE
Status: DISCONTINUED | OUTPATIENT
Start: 2024-05-26 | End: 2024-05-29 | Stop reason: HOSPADM

## 2024-05-26 RX ORDER — LEVETIRACETAM 500 MG/1
500 TABLET ORAL 2 TIMES DAILY
Status: DISCONTINUED | OUTPATIENT
Start: 2024-05-26 | End: 2024-05-26

## 2024-05-26 RX ORDER — DEXTROSE MONOHYDRATE 100 MG/ML
INJECTION, SOLUTION INTRAVENOUS CONTINUOUS PRN
Status: DISCONTINUED | OUTPATIENT
Start: 2024-05-26 | End: 2024-05-29 | Stop reason: HOSPADM

## 2024-05-26 RX ORDER — LEVETIRACETAM 250 MG/1
250 TABLET ORAL 2 TIMES DAILY
Status: DISCONTINUED | OUTPATIENT
Start: 2024-05-26 | End: 2024-05-26

## 2024-05-26 RX ORDER — ATORVASTATIN CALCIUM 10 MG/1
10 TABLET, FILM COATED ORAL DAILY
Status: DISCONTINUED | OUTPATIENT
Start: 2024-05-26 | End: 2024-05-29 | Stop reason: HOSPADM

## 2024-05-26 RX ORDER — HYDRALAZINE HYDROCHLORIDE 20 MG/ML
10 INJECTION INTRAMUSCULAR; INTRAVENOUS EVERY 6 HOURS PRN
Status: DISCONTINUED | OUTPATIENT
Start: 2024-05-26 | End: 2024-05-26

## 2024-05-26 RX ORDER — HYDRALAZINE HYDROCHLORIDE 20 MG/ML
10 INJECTION INTRAMUSCULAR; INTRAVENOUS EVERY 6 HOURS PRN
Status: DISCONTINUED | OUTPATIENT
Start: 2024-05-26 | End: 2024-05-29 | Stop reason: HOSPADM

## 2024-05-26 RX ORDER — ACETAMINOPHEN 325 MG/1
650 TABLET ORAL EVERY 4 HOURS PRN
Status: DISCONTINUED | OUTPATIENT
Start: 2024-05-26 | End: 2024-05-29 | Stop reason: HOSPADM

## 2024-05-26 RX ORDER — POTASSIUM CHLORIDE 7.45 MG/ML
10 INJECTION INTRAVENOUS
Status: COMPLETED | OUTPATIENT
Start: 2024-05-26 | End: 2024-05-26

## 2024-05-26 RX ORDER — SODIUM CHLORIDE 0.9 % (FLUSH) 0.9 %
5-40 SYRINGE (ML) INJECTION EVERY 12 HOURS SCHEDULED
Status: DISCONTINUED | OUTPATIENT
Start: 2024-05-26 | End: 2024-05-29 | Stop reason: HOSPADM

## 2024-05-26 RX ORDER — SODIUM CHLORIDE 0.9 % (FLUSH) 0.9 %
5-40 SYRINGE (ML) INJECTION PRN
Status: DISCONTINUED | OUTPATIENT
Start: 2024-05-26 | End: 2024-05-29 | Stop reason: HOSPADM

## 2024-05-26 RX ADMIN — HYDRALAZINE HYDROCHLORIDE 10 MG: 20 INJECTION INTRAMUSCULAR; INTRAVENOUS at 20:08

## 2024-05-26 RX ADMIN — FENTANYL CITRATE 25 MCG: 50 INJECTION INTRAMUSCULAR; INTRAVENOUS at 02:03

## 2024-05-26 RX ADMIN — ACETAMINOPHEN 650 MG: 325 TABLET ORAL at 16:54

## 2024-05-26 RX ADMIN — DOCUSATE SODIUM 100 MG: 100 CAPSULE, LIQUID FILLED ORAL at 20:10

## 2024-05-26 RX ADMIN — BUPROPION HYDROCHLORIDE 300 MG: 300 TABLET, FILM COATED, EXTENDED RELEASE ORAL at 20:10

## 2024-05-26 RX ADMIN — POTASSIUM CHLORIDE 10 MEQ: 7.46 INJECTION, SOLUTION INTRAVENOUS at 05:15

## 2024-05-26 RX ADMIN — BUPROPION HYDROCHLORIDE 150 MG: 150 TABLET, EXTENDED RELEASE ORAL at 14:48

## 2024-05-26 RX ADMIN — POTASSIUM CHLORIDE 10 MEQ: 7.46 INJECTION, SOLUTION INTRAVENOUS at 03:51

## 2024-05-26 RX ADMIN — PRIMIDONE 50 MG: 50 TABLET ORAL at 14:48

## 2024-05-26 RX ADMIN — POTASSIUM CHLORIDE 10 MEQ: 7.46 INJECTION, SOLUTION INTRAVENOUS at 06:28

## 2024-05-26 RX ADMIN — SODIUM CHLORIDE: 9 INJECTION, SOLUTION INTRAVENOUS at 03:23

## 2024-05-26 RX ADMIN — TRANEXAMIC ACID 1000 MG: 10 INJECTION, SOLUTION INTRAVENOUS at 02:22

## 2024-05-26 RX ADMIN — HYDRALAZINE HYDROCHLORIDE 10 MG: 20 INJECTION INTRAMUSCULAR; INTRAVENOUS at 12:52

## 2024-05-26 RX ADMIN — SODIUM CHLORIDE, PRESERVATIVE FREE 10 ML: 5 INJECTION INTRAVENOUS at 20:10

## 2024-05-26 RX ADMIN — HYDRALAZINE HYDROCHLORIDE 10 MG: 20 INJECTION INTRAMUSCULAR; INTRAVENOUS at 05:01

## 2024-05-26 RX ADMIN — SODIUM CHLORIDE 5 MG/HR: 9 INJECTION, SOLUTION INTRAVENOUS at 03:40

## 2024-05-26 RX ADMIN — LEVETIRACETAM 1000 MG: 100 INJECTION, SOLUTION INTRAVENOUS at 04:57

## 2024-05-26 RX ADMIN — AMLODIPINE BESYLATE 5 MG: 5 TABLET ORAL at 14:48

## 2024-05-26 ASSESSMENT — PATIENT HEALTH QUESTIONNAIRE - PHQ9
1. LITTLE INTEREST OR PLEASURE IN DOING THINGS: NOT AT ALL
SUM OF ALL RESPONSES TO PHQ QUESTIONS 1-9: 0
2. FEELING DOWN, DEPRESSED OR HOPELESS: NOT AT ALL
SUM OF ALL RESPONSES TO PHQ9 QUESTIONS 1 & 2: 0
SUM OF ALL RESPONSES TO PHQ QUESTIONS 1-9: 0

## 2024-05-26 ASSESSMENT — PAIN - FUNCTIONAL ASSESSMENT
PAIN_FUNCTIONAL_ASSESSMENT: 0-10
PAIN_FUNCTIONAL_ASSESSMENT: 0-10

## 2024-05-26 ASSESSMENT — PAIN SCALES - GENERAL
PAINLEVEL_OUTOF10: 6
PAINLEVEL_OUTOF10: 5
PAINLEVEL_OUTOF10: 5

## 2024-05-26 NOTE — H&P
Trauma History and Physical   Dr Duran      Patient:  Deloris Watts  Admit date: 5/25/2024   YOB: 1955 Date of Evaluation: 5/26/2024  MRN: [id number]  Acct: [id number]    Injury Date:5/ 25/2024 Injury time:PTA  PCP: Johana Weldon MD   Referring physician: Dr Aggarwal    Time of Trauma Surgeon Notification:  0115  Time of Trauma MICHAELA Arrival: 0145  Time of Trauma Surgeon Arrival:  0600  Services Requested Within 30 Minutes:    Time Contacted:    Assessment:    Trauma by fall on blood thinner   Small acute left parafalcine subdural hematoma  CKD, CVA, HTN, IBS, CHF  Plan:    Trauma by fall on blood thinner   - admit to ICU    - fall risk    - bed rest    - PT/OT/SLP to eval and treat when appropriate     Small acute left parafalcine SDH   -Consult neurosurgery    -Neuro checks per unit routine   -Maintain systolic blood pressure between 100-160   -Trauma dose TXA given    -Elevate head of bed approximately 30 degrees   -Hold all anticoagulant and antiplatelet medications   -Repeat head CT in AM   -Seizure precautions     CKD, CVA, HTN, IBS, CHF   - consult Intensivist    - resume home meds, not Plavix/ASA    - adult diet as tolerated     Consults: intensivist, Neurosurgery     IVF  Pain meds prn   PT/OT/SLP eval and treat when appropriate   Prophylaxis: SCDs, IS, C&DB, Stool softener, pepcid   Discharge disposition pending clinical course       Activation: []Level I (Trauma Alert) []Level II (Injury Call) [x]Level III (Trauma Consult) []Downgraded  Mode of Arrival: EMS transportation  Referring Facility: N/A   Loss of Consciousness [x]No []Yes[]Unknown  Duration(min)  Mechanism of Injury:  []Motor Vehicle crash   []Single Vehicle [] []Passenger []Scene Fatality []Front Seat  []Restrained   []Air Bag Deployed   []Ejected []Rollover []Pedestrian []Trapped   Type of vehicle:   Protective Devices:   []Motorcycle  Wearing Helmet []Yes []No  []Bicycle  Wearing Helmet []Yes []No  [x]Fall

## 2024-05-26 NOTE — ED NOTES
ED to inpatient nurses report      Chief Complaint:  Chief Complaint   Patient presents with    Fall    Head Injury     Present to ED from: home    MOA:     LOC: alert to only name  Mobility: Requires assistance * 2  Oxygen Baseline: room air    Current needs required: room air     Code Status:   Full Code    What abnormal results were found and what did you give/do to treat them?   Any procedures or intervention occur? NA    Mental Status:  Level of Consciousness: Alert (0)    Psych Assessment:        Vitals:  Patient Vitals for the past 24 hrs:   BP Temp Temp src Pulse Resp SpO2 Height Weight   05/26/24 0130 (!) 156/93 -- -- 86 14 99 % -- --   05/26/24 0115 (!) 150/71 -- -- 86 17 99 % -- --   05/26/24 0002 (!) 147/95 -- -- 82 17 100 % -- --   05/25/24 2351 (!) 135/108 98.3 °F (36.8 °C) Oral 81 16 100 % 1.524 m (5') 39.9 kg (88 lb)        LDAs:   Peripheral IV 05/26/24 Right Forearm (Active)   Site Assessment Clean, dry & intact 05/26/24 0045   Line Status Flushed;Normal saline locked;Blood return noted 05/26/24 0045   Phlebitis Assessment No symptoms 05/26/24 0045   Infiltration Assessment 0 05/26/24 0045   Dressing Status New dressing applied;Clean, dry & intact 05/26/24 0045       Ambulatory Status:  No data recorded    Diagnosis:  DISPOSITION Admitted 05/26/2024 01:45:44 AM   Final diagnoses:   Subdural hemorrhage (HCC)        Consults:  IP CONSULT TO NEUROSURGERY  IP CONSULT TO CRITICAL CARE     Pain Score:  Pain Assessment  Pain Assessment: 0-10  Pain Level: 5  Burt-Baker Pain Rating: Hurts even more, Hurts whole lot    C-SSRS:   Risk of Suicide: No Risk    Sepsis Screening:  Sepsis Risk Score: 0.93    [place] Fall Risk:       Swallow Screening        Preferred Language:   English      ALLERGIES     Pioglitazone, Amoxicillin, Amoxicillin-pot clavulanate, Other, Ciprofloxacin, and Sulfa antibiotics    SURGICAL HISTORY       Past Surgical History:   Procedure Laterality Date    CARPAL TUNNEL RELEASE Right     in

## 2024-05-26 NOTE — ED NOTES
Patient  states \"she was in chair, I called my brother to come over and help us to get up with walker. We walking down the hallway and I was holding onto the walker and her legs slipped once we got to the bedroom. I went to grab her legs to help her stand but she fell back onto her back and hit her head on the carpet in the bedroom. She does have history of strokes.\"

## 2024-05-26 NOTE — ED TRIAGE NOTES
Patient presents to ED from home with C/O fall and head injury. EMS states  found patient on the ground and patient stated she was brushing her teeth and fell in the bathroom. EMS states patient denies LOC. Patient had back surgery recently and has chronic back. EMS states patient hit her head. Patient is on blood thinners. No trauma noted to head at this time. Patient has bruising and skin tears noted tp upper extremities, these are due to previous falls per EMS.  Patient  stated to EMS he believes patient has dementia and is borderline confused. VS obtained at this time.

## 2024-05-26 NOTE — ED PROVIDER NOTES
[facility] EMERGENCY DEPT      EMERGENCY MEDICINE     Pt Name: Deloris Watts  MRN: [id number]  Birthdate 1955  Date of evaluation: 5/25/2024  Provider: ANAY LUNA DO, FACEP    CHIEF COMPLAINT       Chief Complaint   Patient presents with    Fall    Head Injury     HISTORY OF PRESENT ILLNESS   Deloris Watts is a pleasant 68 y.o. female who presents to the emergency department for evaluation of ground-level fall.  She presents with her , who was helping her transfer into a wheelchair, apparently the patient lost footing and somehow hit up falling during transfer, striking her head on the carpet.  She is on Plavix, last Dose at 11:00 yesterday morning.  There is no loss of consciousness.  Patient has some discomfort in her left upper thigh but otherwise denies any pain or any injuries.    PASTMEDICAL HISTORY/Co-Morbid Conditions:     Past Medical History:   Diagnosis Date    Allergic rhinitis     Anemia in CKD (chronic kidney disease)     Anxiety     CHF (congestive heart failure) ([facility])     Closed fracture of right proximal humerus 09/18/2021    ORIF    Closed right ankle fracture     In 1990s; treated with casting    CVA (cerebral infarction)     in 1990s ; with left-sided weakness    Depression     Fibromyalgia     in 1990s    Headache(784.0)     Hemiplegia and hemiparesis following cerebral infarction affecting left non-dominant side (HCC)     in 1990s    Hydronephrosis 09/01/2023    Urogenital Implants, calculus of ureter, UTI    Hyperlipidemia     Hypertension     in 1980s    Irritable bowel syndrome     in 1990s    Kidney failure     Chronic Kidney Disease, Renal Dialysis started in 1/2023    Osteoporosis 2019    Unspecified cerebral artery occlusion with cerebral infarction     Unspecified sleep apnea     Wrist fracture, right 2018    Treated with casting       Patient Active Problem List   Diagnosis Code    Cerebral infarction (HCC) I63.9    Hx of Chest pain, atypical- tenderness on the

## 2024-05-27 PROBLEM — S06.5XAA SDH (SUBDURAL HEMATOMA) (HCC): Status: ACTIVE | Noted: 2024-05-27

## 2024-05-27 PROBLEM — Z79.02 ANTIPLATELET OR ANTITHROMBOTIC LONG-TERM USE: Status: ACTIVE | Noted: 2024-05-27

## 2024-05-27 LAB
AMPHETAMINES UR QL SCN: NEGATIVE
ANION GAP SERPL CALC-SCNC: 18 MEQ/L (ref 8–16)
BARBITURATES UR QL SCN: POSITIVE
BENZODIAZ UR QL SCN: NEGATIVE
BUN SERPL-MCNC: 43 MG/DL (ref 7–22)
BZE UR QL SCN: NEGATIVE
CA-I BLD ISE-SCNC: 1.01 MMOL/L (ref 1.12–1.32)
CALCIUM SERPL-MCNC: 8.7 MG/DL (ref 8.5–10.5)
CANNABINOIDS UR QL SCN: NEGATIVE
CHLORIDE SERPL-SCNC: 103 MEQ/L (ref 98–111)
CO2 SERPL-SCNC: 16 MEQ/L (ref 23–33)
CREAT SERPL-MCNC: 6 MG/DL (ref 0.4–1.2)
DEPRECATED RDW RBC AUTO: 73 FL (ref 35–45)
ERYTHROCYTE [DISTWIDTH] IN BLOOD BY AUTOMATED COUNT: 17.3 % (ref 11.5–14.5)
FENTANYL: NEGATIVE
GFR SERPL CREATININE-BSD FRML MDRD: 7 ML/MIN/1.73M2
GLUCOSE SERPL-MCNC: 79 MG/DL (ref 70–108)
HCT VFR BLD AUTO: 43 % (ref 37–47)
HGB BLD-MCNC: 11.8 GM/DL (ref 12–16)
MAGNESIUM SERPL-MCNC: 2.3 MG/DL (ref 1.6–2.4)
MCH RBC QN AUTO: 31.6 PG (ref 26–33)
MCHC RBC AUTO-ENTMCNC: 27.4 GM/DL (ref 32.2–35.5)
MCV RBC AUTO: 115 FL (ref 81–99)
OPIATES UR QL SCN: NEGATIVE
OXYCODONE: NEGATIVE
PCP UR QL SCN: NEGATIVE
PHOSPHATE SERPL-MCNC: 6.7 MG/DL (ref 2.4–4.7)
PLATELET # BLD AUTO: 232 THOU/MM3 (ref 130–400)
PMV BLD AUTO: 10.6 FL (ref 9.4–12.4)
POTASSIUM SERPL-SCNC: 4.3 MEQ/L (ref 3.5–5.2)
RBC # BLD AUTO: 3.74 MILL/MM3 (ref 4.2–5.4)
REASON FOR REJECTION: NORMAL
REJECTED TEST: NORMAL
SODIUM SERPL-SCNC: 137 MEQ/L (ref 135–145)
WBC # BLD AUTO: 10.1 THOU/MM3 (ref 4.8–10.8)

## 2024-05-27 PROCEDURE — 90935 HEMODIALYSIS ONE EVALUATION: CPT | Performed by: INTERNAL MEDICINE

## 2024-05-27 PROCEDURE — 6360000002 HC RX W HCPCS

## 2024-05-27 PROCEDURE — 6370000000 HC RX 637 (ALT 250 FOR IP): Performed by: INTERNAL MEDICINE

## 2024-05-27 PROCEDURE — 6370000000 HC RX 637 (ALT 250 FOR IP): Performed by: PHYSICIAN ASSISTANT

## 2024-05-27 PROCEDURE — 82330 ASSAY OF CALCIUM: CPT

## 2024-05-27 PROCEDURE — 80307 DRUG TEST PRSMV CHEM ANLYZR: CPT

## 2024-05-27 PROCEDURE — 99233 SBSQ HOSP IP/OBS HIGH 50: CPT | Performed by: INTERNAL MEDICINE

## 2024-05-27 PROCEDURE — 36415 COLL VENOUS BLD VENIPUNCTURE: CPT

## 2024-05-27 PROCEDURE — 84100 ASSAY OF PHOSPHORUS: CPT

## 2024-05-27 PROCEDURE — 2140000000 HC CCU INTERMEDIATE R&B

## 2024-05-27 PROCEDURE — 90935 HEMODIALYSIS ONE EVALUATION: CPT

## 2024-05-27 PROCEDURE — 85027 COMPLETE CBC AUTOMATED: CPT

## 2024-05-27 PROCEDURE — 2580000003 HC RX 258

## 2024-05-27 PROCEDURE — 83735 ASSAY OF MAGNESIUM: CPT

## 2024-05-27 PROCEDURE — 80048 BASIC METABOLIC PNL TOTAL CA: CPT

## 2024-05-27 PROCEDURE — 5A1D70Z PERFORMANCE OF URINARY FILTRATION, INTERMITTENT, LESS THAN 6 HOURS PER DAY: ICD-10-PCS | Performed by: INTERNAL MEDICINE

## 2024-05-27 RX ORDER — LEVETIRACETAM 500 MG/1
500 TABLET ORAL DAILY
Status: DISCONTINUED | OUTPATIENT
Start: 2024-05-28 | End: 2024-05-29 | Stop reason: HOSPADM

## 2024-05-27 RX ORDER — SEVELAMER CARBONATE 800 MG/1
800 TABLET, FILM COATED ORAL
Status: DISCONTINUED | OUTPATIENT
Start: 2024-05-27 | End: 2024-05-29 | Stop reason: HOSPADM

## 2024-05-27 RX ADMIN — SODIUM CHLORIDE, PRESERVATIVE FREE 10 ML: 5 INJECTION INTRAVENOUS at 07:59

## 2024-05-27 RX ADMIN — SERTRALINE HYDROCHLORIDE 50 MG: 50 TABLET ORAL at 07:58

## 2024-05-27 RX ADMIN — BUPROPION HYDROCHLORIDE 300 MG: 300 TABLET, FILM COATED, EXTENDED RELEASE ORAL at 21:30

## 2024-05-27 RX ADMIN — ATORVASTATIN CALCIUM 10 MG: 10 TABLET, FILM COATED ORAL at 07:58

## 2024-05-27 RX ADMIN — DOCUSATE SODIUM 100 MG: 100 CAPSULE, LIQUID FILLED ORAL at 13:12

## 2024-05-27 RX ADMIN — DOCUSATE SODIUM 100 MG: 100 CAPSULE, LIQUID FILLED ORAL at 21:30

## 2024-05-27 RX ADMIN — HYDRALAZINE HYDROCHLORIDE 10 MG: 20 INJECTION INTRAMUSCULAR; INTRAVENOUS at 02:04

## 2024-05-27 RX ADMIN — FLUTICASONE PROPIONATE 2 SPRAY: 50 SPRAY, METERED NASAL at 12:00

## 2024-05-27 RX ADMIN — SODIUM CHLORIDE, PRESERVATIVE FREE 10 ML: 5 INJECTION INTRAVENOUS at 02:05

## 2024-05-27 RX ADMIN — LEVETIRACETAM 500 MG: 100 INJECTION, SOLUTION INTRAVENOUS at 11:56

## 2024-05-27 RX ADMIN — AMLODIPINE BESYLATE 5 MG: 5 TABLET ORAL at 07:59

## 2024-05-27 RX ADMIN — PRIMIDONE 50 MG: 50 TABLET ORAL at 07:58

## 2024-05-27 RX ADMIN — DOCUSATE SODIUM 100 MG: 100 CAPSULE, LIQUID FILLED ORAL at 08:01

## 2024-05-27 RX ADMIN — BUPROPION HYDROCHLORIDE 150 MG: 150 TABLET, EXTENDED RELEASE ORAL at 07:58

## 2024-05-27 RX ADMIN — SEVELAMER CARBONATE 800 MG: 800 TABLET, FILM COATED ORAL at 13:11

## 2024-05-27 RX ADMIN — SEVELAMER CARBONATE 800 MG: 800 TABLET, FILM COATED ORAL at 16:49

## 2024-05-27 ASSESSMENT — PAIN SCALES - GENERAL: PAINLEVEL_OUTOF10: 0

## 2024-05-27 NOTE — FLOWSHEET NOTE
Tenzin called and updated that pt would be moving rooms down to 3A ICU room 11. Reassured  that pt was stable and doing well. Questions answered.

## 2024-05-27 NOTE — FLOWSHEET NOTE
Stable 2 hour and 30 min TX completed. Removed 1 L of fluid. pt tolerated fluid removal well. Bilateral cath ports flushed with normal saline, clamped, and secured with tegos. CVC drsg clean, dry, and intact. Report called to primary RN. TX record printed for scanning into EMR.   05/27/24 0830 05/27/24 1122   Vital Signs   BP (!) 161/90 (!) 123/54   Temp  --  98 °F (36.7 °C)   Pulse 92 95   Respirations 15 22   SpO2 97 % 96 %   Weight - Scale 45.9 kg (101 lb 3.1 oz) 44.9 kg (98 lb 15.8 oz)   Weight Method Bed scale Bed scale   Percent Weight Change  --  -2.18   Post-Hemodialysis Assessment   Post-Treatment Procedures  --  Blood returned;Catheter Capped, clamped with Saline x2 ports   Machine Disinfection Process  --  Acid/Vinegar Clean;Heat Disinfect;Exterior Machine Disinfection   Blood Volume Processed (Liters)  --  58.6 L   Dialyzer Clearance  --  Lightly streaked   Duration of Treatment (minutes)  --  150 minutes   Heparin Amount Administered During Treatment (mL)  --  0 mL   Hemodialysis Intake (ml)  --  400 ml   Hemodialysis Output (ml)  --  1400 ml   NET Removed (ml)  --  1000

## 2024-05-28 LAB
ANION GAP SERPL CALC-SCNC: 15 MEQ/L (ref 8–16)
BUN SERPL-MCNC: 25 MG/DL (ref 7–22)
CA-I BLD ISE-SCNC: 1.03 MMOL/L (ref 1.12–1.32)
CALCIUM SERPL-MCNC: 8.6 MG/DL (ref 8.5–10.5)
CHLORIDE SERPL-SCNC: 99 MEQ/L (ref 98–111)
CO2 SERPL-SCNC: 24 MEQ/L (ref 23–33)
CREAT SERPL-MCNC: 4 MG/DL (ref 0.4–1.2)
DEPRECATED RDW RBC AUTO: 62.2 FL (ref 35–45)
ERYTHROCYTE [DISTWIDTH] IN BLOOD BY AUTOMATED COUNT: 16.4 % (ref 11.5–14.5)
GFR SERPL CREATININE-BSD FRML MDRD: 11 ML/MIN/1.73M2
GLUCOSE BLD STRIP.AUTO-MCNC: 105 MG/DL (ref 70–108)
GLUCOSE BLD STRIP.AUTO-MCNC: 111 MG/DL (ref 70–108)
GLUCOSE BLD STRIP.AUTO-MCNC: 54 MG/DL (ref 70–108)
GLUCOSE BLD STRIP.AUTO-MCNC: 65 MG/DL (ref 70–108)
GLUCOSE BLD STRIP.AUTO-MCNC: 71 MG/DL (ref 70–108)
GLUCOSE SERPL-MCNC: 60 MG/DL (ref 70–108)
HCT VFR BLD AUTO: 39.2 % (ref 37–47)
HGB BLD-MCNC: 11.9 GM/DL (ref 12–16)
MAGNESIUM SERPL-MCNC: 2.1 MG/DL (ref 1.6–2.4)
MCH RBC QN AUTO: 31.2 PG (ref 26–33)
MCHC RBC AUTO-ENTMCNC: 30.4 GM/DL (ref 32.2–35.5)
MCV RBC AUTO: 102.9 FL (ref 81–99)
PHOSPHATE SERPL-MCNC: 4.3 MG/DL (ref 2.4–4.7)
PLATELET # BLD AUTO: 234 THOU/MM3 (ref 130–400)
PMV BLD AUTO: 10.3 FL (ref 9.4–12.4)
POTASSIUM SERPL-SCNC: 4.1 MEQ/L (ref 3.5–5.2)
RBC # BLD AUTO: 3.81 MILL/MM3 (ref 4.2–5.4)
SODIUM SERPL-SCNC: 138 MEQ/L (ref 135–145)
WBC # BLD AUTO: 6.2 THOU/MM3 (ref 4.8–10.8)

## 2024-05-28 PROCEDURE — 36415 COLL VENOUS BLD VENIPUNCTURE: CPT

## 2024-05-28 PROCEDURE — 80048 BASIC METABOLIC PNL TOTAL CA: CPT

## 2024-05-28 PROCEDURE — 1200000003 HC TELEMETRY R&B

## 2024-05-28 PROCEDURE — 97116 GAIT TRAINING THERAPY: CPT

## 2024-05-28 PROCEDURE — 97530 THERAPEUTIC ACTIVITIES: CPT

## 2024-05-28 PROCEDURE — 97129 THER IVNTJ 1ST 15 MIN: CPT

## 2024-05-28 PROCEDURE — 82330 ASSAY OF CALCIUM: CPT

## 2024-05-28 PROCEDURE — 85027 COMPLETE CBC AUTOMATED: CPT

## 2024-05-28 PROCEDURE — 6370000000 HC RX 637 (ALT 250 FOR IP): Performed by: PHYSICIAN ASSISTANT

## 2024-05-28 PROCEDURE — 99232 SBSQ HOSP IP/OBS MODERATE 35: CPT | Performed by: INTERNAL MEDICINE

## 2024-05-28 PROCEDURE — 83735 ASSAY OF MAGNESIUM: CPT

## 2024-05-28 PROCEDURE — 92523 SPEECH SOUND LANG COMPREHEN: CPT

## 2024-05-28 PROCEDURE — 99222 1ST HOSP IP/OBS MODERATE 55: CPT | Performed by: STUDENT IN AN ORGANIZED HEALTH CARE EDUCATION/TRAINING PROGRAM

## 2024-05-28 PROCEDURE — 6370000000 HC RX 637 (ALT 250 FOR IP): Performed by: INTERNAL MEDICINE

## 2024-05-28 PROCEDURE — 84100 ASSAY OF PHOSPHORUS: CPT

## 2024-05-28 PROCEDURE — 82948 REAGENT STRIP/BLOOD GLUCOSE: CPT

## 2024-05-28 PROCEDURE — 97110 THERAPEUTIC EXERCISES: CPT

## 2024-05-28 PROCEDURE — 97535 SELF CARE MNGMENT TRAINING: CPT

## 2024-05-28 PROCEDURE — 92526 ORAL FUNCTION THERAPY: CPT

## 2024-05-28 PROCEDURE — 6370000000 HC RX 637 (ALT 250 FOR IP)

## 2024-05-28 PROCEDURE — 97166 OT EVAL MOD COMPLEX 45 MIN: CPT

## 2024-05-28 RX ADMIN — DOCUSATE SODIUM 100 MG: 100 CAPSULE, LIQUID FILLED ORAL at 17:05

## 2024-05-28 RX ADMIN — BUPROPION HYDROCHLORIDE 300 MG: 300 TABLET, FILM COATED, EXTENDED RELEASE ORAL at 20:43

## 2024-05-28 RX ADMIN — SEVELAMER CARBONATE 800 MG: 800 TABLET, FILM COATED ORAL at 11:45

## 2024-05-28 RX ADMIN — DOCUSATE SODIUM 100 MG: 100 CAPSULE, LIQUID FILLED ORAL at 20:43

## 2024-05-28 RX ADMIN — FLUTICASONE PROPIONATE 2 SPRAY: 50 SPRAY, METERED NASAL at 09:26

## 2024-05-28 RX ADMIN — SEVELAMER CARBONATE 800 MG: 800 TABLET, FILM COATED ORAL at 17:05

## 2024-05-28 RX ADMIN — ATORVASTATIN CALCIUM 10 MG: 10 TABLET, FILM COATED ORAL at 09:27

## 2024-05-28 RX ADMIN — DOCUSATE SODIUM 100 MG: 100 CAPSULE, LIQUID FILLED ORAL at 09:27

## 2024-05-28 RX ADMIN — LEVETIRACETAM 500 MG: 500 TABLET, FILM COATED ORAL at 09:27

## 2024-05-28 RX ADMIN — BUPROPION HYDROCHLORIDE 150 MG: 150 TABLET, EXTENDED RELEASE ORAL at 09:27

## 2024-05-28 RX ADMIN — SERTRALINE HYDROCHLORIDE 50 MG: 50 TABLET ORAL at 09:27

## 2024-05-28 RX ADMIN — PRIMIDONE 50 MG: 50 TABLET ORAL at 09:27

## 2024-05-28 RX ADMIN — AMLODIPINE BESYLATE 5 MG: 5 TABLET ORAL at 09:27

## 2024-05-28 RX ADMIN — SEVELAMER CARBONATE 800 MG: 800 TABLET, FILM COATED ORAL at 09:26

## 2024-05-28 ASSESSMENT — PAIN SCALES - GENERAL
PAINLEVEL_OUTOF10: 0

## 2024-05-28 NOTE — CONSULTS
COLONOSCOPY  2012    [facility]    CT BIOPSY RENAL  12/28/2022    CT BIOPSY RENAL 12/28/2022 [facility] CT SCAN    CYSTOSCOPY Left 08/29/2023    CYSTOSCOPY, LEFT URETERAL STENT PLACEMENT performed by Edilson Whalen MD at [facility] OR    ENDOSCOPY, COLON, DIAGNOSTIC  unsure    HEMORRHOID SURGERY  unsure    HIP SURGERY Left 3/11/2024    Left Misael Hip Arthroplasty performed by Virgilio Lezama DO at [facility] OR    HUMERUS FRACTURE SURGERY Right 2021    ORIF    HYSTERECTOMY (CERVIX STATUS UNKNOWN)      age 38    LASIK      lasik    NECK SURGERY  18  years ago    cervical spine fusion ; in 1990s    OVARY REMOVAL Bilateral     age 38    SINUS SURGERY  10 years ago    SPINE SURGERY N/A 12/18/2023    KYPHOPLASTY L2 performed by Nimisha Garza MD at [facility] OR    TUBAL LIGATION      URETER SURGERY N/A 09/20/2023    CYSTOSCOPY, LEFT  URETEROSCOPY, LASER LITHOTRIPSIE OF STONES performed by Edilson Whalen MD at [facility] OR       Family History:  Family History   Problem Relation Age of Onset    Diabetes Mother     High Blood Pressure Mother     Heart Disease Mother     Heart Disease Father     Parkinsonism Father     Neurofibromatosis Father     Depression Father     Alcohol Abuse Father     Neurofibromatosis Sister     Diabetes Sister     High Blood Pressure Sister     Other Sister         suicide    Depression Sister     Neurofibromatosis Brother     Dementia Brother     Mult Sclerosis Brother     Hypertension Brother     Diabetes Brother        Social History:  Social History     Socioeconomic History    Marital status:      Spouse name: Tenzin    Number of children: 2    Years of education: Not on file    Highest education level: Not on file   Occupational History    Occupation: unemployed at present time   Tobacco Use    Smoking status: Never    Smokeless tobacco: Never   Vaping Use    Vaping Use: Never used   Substance and Sexual Activity    Alcohol use: No     Alcohol/week: 0.0 standard drinks of alcohol    Drug use: No    Sexual activity: Not on 
05/26/24  0022 05/26/24  0731   WBC 6.3 6.4   HGB 12.9 12.5   HCT 44.3 40.0    240     BMP:  Recent Labs     05/26/24  0022 05/26/24  0731    138   K 3.3* 3.9   CL 98 101   CO2 21* 24   BUN 31* 33*   CREATININE 4.6* 5.0*   GLUCOSE 93 69*   MG  --  2.2   PHOS  --  5.6*   CALCIUM 8.8 8.6     Hepatic:   Recent Labs     05/26/24  0022   AST 23   ALT 11   BILITOT 0.6   ALKPHOS 160*     Cardiac Enzymes: No results for input(s): \"CKTOTAL\", \"CKMB\", \"TROPONINI\" in the last 72 hours.  BNP: No results for input(s): \"BNP\" in the last 72 hours.  INR:   Recent Labs     05/26/24  0731   INR 0.85     POC   Recent Labs     05/26/24  1021   POCGLU 84     No results for input(s): \"LACTA\" in the last 72 hours.     sodium chloride      sodium chloride 125 mL/hr at 05/26/24 0323    dextrose          amLODIPine  5 mg Oral Daily    buPROPion  150 mg Oral QAM    buPROPion  300 mg Oral Daily    docusate  100 mg Oral TID    fluticasone  2 spray Each Nostril Daily    primidone  50 mg Oral Daily    sertraline  50 mg Oral Daily    atorvastatin  10 mg Oral Daily    sodium chloride flush  5-40 mL IntraVENous 2 times per day    polyethylene glycol  17 g Oral Daily    potassium chloride  40 mEq Oral Once    hydrALAZINE        [START ON 5/27/2024] levETIRAcetam  500 mg IntraVENous Daily       24HR INTAKE/OUTPUT:  No intake or output data in the 24 hours ending 05/26/24 1103    CT scan of head without contrast performed on: 5/26/24:  IMPRESSION:  Impression:  1. Small acute left parafalcine subdural hematoma.  2. Chronic findings as above.    DVT prophylaxis reviewed.  SCDs to both lower extremities.    Electronically signed by   Arlyn Day MD on 5/26/2024 at 11:03 AM          
AM    MCHC 31.3 05/26/2024 07:31 AM    RDW 15.1 05/10/2023 11:57 AM     05/26/2024 07:31 AM    MPV 11.1 05/26/2024 07:31 AM     CMP:    Lab Results   Component Value Date/Time     05/26/2024 07:31 AM    K 3.9 05/26/2024 07:31 AM    K 4.3 05/07/2024 06:29 AM     05/26/2024 07:31 AM    CO2 24 05/26/2024 07:31 AM    BUN 33 05/26/2024 07:31 AM    CREATININE 5.0 05/26/2024 07:31 AM    LABGLOM 9 05/26/2024 07:31 AM    LABGLOM 7 04/12/2024 09:14 AM    GLUCOSE 69 05/26/2024 07:31 AM    GLUCOSE 81 01/18/2017 08:50 AM    CALCIUM 8.6 05/26/2024 07:31 AM    BILITOT 0.6 05/26/2024 12:22 AM    ALKPHOS 160 05/26/2024 12:22 AM    AST 23 05/26/2024 12:22 AM    ALT 11 05/26/2024 12:22 AM     PT/INR:    Lab Results   Component Value Date/Time    INR 0.85 05/26/2024 07:31 AM     PTT:    Lab Results   Component Value Date/Time    APTT 27.3 05/26/2024 07:31 AM   [APTT}    RADIOLOGY:  Pertinent images have been reviewed.    Brain CT:  Small acute left parafalcine subdural hematoma.     Brain CT showed a stable exam      EXAMINATION:    Patient awake and alert  Confused  GCS: 13-14/15   Pupils: equal and reactive   Cranial nerves II through XII are intact  FCx4 with good strength through out   Sensory: grossly intact  Reflexes: 2+ through out.  Planter reflex: Down response   No otorrhea. No rhinorrhea.        *I spend a total of 75 minutes with greater than 60% of time spent face to face, counseling, coordinating care, examining patient, reviewing images and labs personally, and speaking with team.    Nimisha Garza MD,MD  Electronically signed 5/26/2024   
irritable bowel syndrome, anemia, history of CVA  Patient requires an intensive and coordinated interdisciplinary team approach to the delivery of rehabilitative care.  This level of rehabilitative care is recommended to maximize patient's functional improvement and cannot be provided at a lover level of care.       It was my pleasure to evaluate Deloris Watts today.  Please call with questions.      Approximately 60 minutes were spent reviewing patient's chart/provider notes, obtaining history from patient, performing physical exam, and creating treatment plan       Lindsay Edgar, DO

## 2024-05-29 ENCOUNTER — HOSPITAL ENCOUNTER (INPATIENT)
Age: 69
LOS: 17 days | Discharge: HOME | End: 2024-06-15
Attending: PHYSICAL MEDICINE & REHABILITATION | Admitting: PHYSICAL MEDICINE & REHABILITATION
Payer: MEDICARE

## 2024-05-29 VITALS
SYSTOLIC BLOOD PRESSURE: 142 MMHG | TEMPERATURE: 98 F | WEIGHT: 95.9 LBS | RESPIRATION RATE: 18 BRPM | DIASTOLIC BLOOD PRESSURE: 69 MMHG | BODY MASS INDEX: 18.83 KG/M2 | HEART RATE: 103 BPM | HEIGHT: 60 IN | OXYGEN SATURATION: 99 %

## 2024-05-29 DIAGNOSIS — N18.6 ESRD (END STAGE RENAL DISEASE) (HCC): ICD-10-CM

## 2024-05-29 DIAGNOSIS — I10 ESSENTIAL HYPERTENSION: Chronic | ICD-10-CM

## 2024-05-29 DIAGNOSIS — G20.C PARKINSONISM, UNSPECIFIED PARKINSONISM TYPE (HCC): ICD-10-CM

## 2024-05-29 DIAGNOSIS — R41.89 IMPAIRED COGNITION: ICD-10-CM

## 2024-05-29 DIAGNOSIS — N18.6 ESRD ON HEMODIALYSIS (HCC): ICD-10-CM

## 2024-05-29 DIAGNOSIS — Z99.2 ESRD ON HEMODIALYSIS (HCC): ICD-10-CM

## 2024-05-29 DIAGNOSIS — Z86.73 HISTORY OF CVA (CEREBROVASCULAR ACCIDENT): ICD-10-CM

## 2024-05-29 DIAGNOSIS — S06.5XAA SUBDURAL HEMATOMA, ACUTE (HCC): Primary | ICD-10-CM

## 2024-05-29 DIAGNOSIS — R53.81 PHYSICAL DECONDITIONING: ICD-10-CM

## 2024-05-29 DIAGNOSIS — E78.00 PURE HYPERCHOLESTEROLEMIA: ICD-10-CM

## 2024-05-29 DIAGNOSIS — K58.1 IRRITABLE BOWEL SYNDROME WITH CONSTIPATION: ICD-10-CM

## 2024-05-29 DIAGNOSIS — F41.1 GENERALIZED ANXIETY DISORDER: Chronic | ICD-10-CM

## 2024-05-29 DIAGNOSIS — G25.0 ESSENTIAL TREMOR: ICD-10-CM

## 2024-05-29 DIAGNOSIS — S06.0X0A CEREBRAL CONCUSSION, WITHOUT LOSS OF CONSCIOUSNESS, INITIAL ENCOUNTER: ICD-10-CM

## 2024-05-29 PROBLEM — N18.9: Status: ACTIVE | Noted: 2018-01-18
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
## 2024-05-29 PROBLEM — S06.0XAA CEREBRAL CONCUSSION: Status: ACTIVE | Noted: 2024-05-29

## 2024-05-29 PROBLEM — T14.90XA TRAUMA: Status: RESOLVED | Noted: 2023-12-15 | Resolved: 2024-05-29
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
## 2024-05-29 LAB
ANION GAP SERPL CALC-SCNC: 14 MEQ/L (ref 8–16)
BUN SERPL-MCNC: 31 MG/DL (ref 7–22)
CALCIUM SERPL-MCNC: 8.9 MG/DL (ref 8.5–10.5)
CHLORIDE SERPL-SCNC: 99 MEQ/L (ref 98–111)
CO2 SERPL-SCNC: 23 MEQ/L (ref 23–33)
CREAT SERPL-MCNC: 5.1 MG/DL (ref 0.4–1.2)
DEPRECATED RDW RBC AUTO: 59.7 FL (ref 35–45)
ERYTHROCYTE [DISTWIDTH] IN BLOOD BY AUTOMATED COUNT: 15.8 % (ref 11.5–14.5)
GFR SERPL CREATININE-BSD FRML MDRD: 9 ML/MIN/1.73M2
GLUCOSE BLD STRIP.AUTO-MCNC: 65 MG/DL (ref 70–108)
GLUCOSE BLD STRIP.AUTO-MCNC: 87 MG/DL (ref 70–108)
GLUCOSE BLD STRIP.AUTO-MCNC: 90 MG/DL (ref 70–108)
GLUCOSE SERPL-MCNC: 67 MG/DL (ref 70–108)
HCT VFR BLD AUTO: 41 % (ref 37–47)
HGB BLD-MCNC: 12.6 GM/DL (ref 12–16)
MAGNESIUM SERPL-MCNC: 2.4 MG/DL (ref 1.6–2.4)
MCH RBC QN AUTO: 31.5 PG (ref 26–33)
MCHC RBC AUTO-ENTMCNC: 30.7 GM/DL (ref 32.2–35.5)
MCV RBC AUTO: 102.5 FL (ref 81–99)
PHOSPHATE SERPL-MCNC: 4 MG/DL (ref 2.4–4.7)
PLATELET # BLD AUTO: 267 THOU/MM3 (ref 130–400)
PMV BLD AUTO: 11.2 FL (ref 9.4–12.4)
POTASSIUM SERPL-SCNC: 3.6 MEQ/L (ref 3.5–5.2)
RBC # BLD AUTO: 4 MILL/MM3 (ref 4.2–5.4)
SODIUM SERPL-SCNC: 136 MEQ/L (ref 135–145)
WBC # BLD AUTO: 8.8 THOU/MM3 (ref 4.8–10.8)

## 2024-05-29 PROCEDURE — 83735 ASSAY OF MAGNESIUM: CPT

## 2024-05-29 PROCEDURE — 85027 COMPLETE CBC AUTOMATED: CPT

## 2024-05-29 PROCEDURE — 97530 THERAPEUTIC ACTIVITIES: CPT

## 2024-05-29 PROCEDURE — 97129 THER IVNTJ 1ST 15 MIN: CPT

## 2024-05-29 PROCEDURE — 6370000000 HC RX 637 (ALT 250 FOR IP): Performed by: PHYSICAL MEDICINE & REHABILITATION

## 2024-05-29 PROCEDURE — 97116 GAIT TRAINING THERAPY: CPT

## 2024-05-29 PROCEDURE — 6360000002 HC RX W HCPCS: Performed by: PHYSICAL MEDICINE & REHABILITATION

## 2024-05-29 PROCEDURE — 80048 BASIC METABOLIC PNL TOTAL CA: CPT

## 2024-05-29 PROCEDURE — 6370000000 HC RX 637 (ALT 250 FOR IP)

## 2024-05-29 PROCEDURE — 82948 REAGENT STRIP/BLOOD GLUCOSE: CPT

## 2024-05-29 PROCEDURE — 36415 COLL VENOUS BLD VENIPUNCTURE: CPT

## 2024-05-29 PROCEDURE — 97130 THER IVNTJ EA ADDL 15 MIN: CPT

## 2024-05-29 PROCEDURE — 1180000000 HC REHAB R&B

## 2024-05-29 PROCEDURE — 90935 HEMODIALYSIS ONE EVALUATION: CPT

## 2024-05-29 PROCEDURE — 6370000000 HC RX 637 (ALT 250 FOR IP): Performed by: INTERNAL MEDICINE

## 2024-05-29 PROCEDURE — 99222 1ST HOSP IP/OBS MODERATE 55: CPT | Performed by: PHYSICAL MEDICINE & REHABILITATION

## 2024-05-29 PROCEDURE — 84100 ASSAY OF PHOSPHORUS: CPT

## 2024-05-29 PROCEDURE — 6370000000 HC RX 637 (ALT 250 FOR IP): Performed by: PHYSICIAN ASSISTANT

## 2024-05-29 PROCEDURE — 99232 SBSQ HOSP IP/OBS MODERATE 35: CPT | Performed by: INTERNAL MEDICINE

## 2024-05-29 RX ORDER — SODIUM CHLORIDE 9 MG/ML
INJECTION, SOLUTION INTRAVENOUS PRN
Status: DISCONTINUED | OUTPATIENT
Start: 2024-05-29 | End: 2024-06-15 | Stop reason: HOSPADM

## 2024-05-29 RX ORDER — SODIUM CHLORIDE 0.9 % (FLUSH) 0.9 %
5-40 SYRINGE (ML) INJECTION PRN
Status: CANCELLED | OUTPATIENT
Start: 2024-05-29

## 2024-05-29 RX ORDER — DEXTROSE MONOHYDRATE 100 MG/ML
INJECTION, SOLUTION INTRAVENOUS CONTINUOUS PRN
Status: CANCELLED | OUTPATIENT
Start: 2024-05-29

## 2024-05-29 RX ORDER — AMLODIPINE BESYLATE 5 MG/1
5 TABLET ORAL DAILY
Status: CANCELLED | OUTPATIENT
Start: 2024-05-30

## 2024-05-29 RX ORDER — BUPROPION HYDROCHLORIDE 150 MG/1
150 TABLET ORAL EVERY MORNING
Status: CANCELLED | OUTPATIENT
Start: 2024-05-30

## 2024-05-29 RX ORDER — GLUCAGON 1 MG/ML
1 KIT INJECTION PRN
Status: CANCELLED | OUTPATIENT
Start: 2024-05-29

## 2024-05-29 RX ORDER — SODIUM CHLORIDE 0.9 % (FLUSH) 0.9 %
5-40 SYRINGE (ML) INJECTION EVERY 12 HOURS SCHEDULED
Status: DISCONTINUED | OUTPATIENT
Start: 2024-05-29 | End: 2024-06-02 | Stop reason: ALTCHOICE

## 2024-05-29 RX ORDER — ONDANSETRON 4 MG/1
4 TABLET, ORALLY DISINTEGRATING ORAL 3 TIMES DAILY PRN
Status: CANCELLED | OUTPATIENT
Start: 2024-05-29

## 2024-05-29 RX ORDER — BUPROPION HYDROCHLORIDE 150 MG/1
150 TABLET ORAL EVERY MORNING
Status: DISCONTINUED | OUTPATIENT
Start: 2024-05-30 | End: 2024-06-15 | Stop reason: HOSPADM

## 2024-05-29 RX ORDER — SEVELAMER CARBONATE 800 MG/1
800 TABLET, FILM COATED ORAL
Status: DISCONTINUED | OUTPATIENT
Start: 2024-05-29 | End: 2024-06-15 | Stop reason: HOSPADM

## 2024-05-29 RX ORDER — SEVELAMER CARBONATE 800 MG/1
800 TABLET, FILM COATED ORAL
Status: CANCELLED | OUTPATIENT
Start: 2024-05-29

## 2024-05-29 RX ORDER — SENNOSIDES A AND B 8.6 MG/1
1 TABLET, FILM COATED ORAL NIGHTLY
Status: DISCONTINUED | OUTPATIENT
Start: 2024-05-29 | End: 2024-06-15 | Stop reason: HOSPADM

## 2024-05-29 RX ORDER — BUPROPION HYDROCHLORIDE 300 MG/1
300 TABLET ORAL NIGHTLY
Status: CANCELLED | OUTPATIENT
Start: 2024-05-29

## 2024-05-29 RX ORDER — POLYETHYLENE GLYCOL 3350 17 G/17G
17 POWDER, FOR SOLUTION ORAL DAILY
Status: CANCELLED | OUTPATIENT
Start: 2024-05-30

## 2024-05-29 RX ORDER — SODIUM CHLORIDE 0.9 % (FLUSH) 0.9 %
5-40 SYRINGE (ML) INJECTION EVERY 12 HOURS SCHEDULED
Status: CANCELLED | OUTPATIENT
Start: 2024-05-29

## 2024-05-29 RX ORDER — DEXTROSE MONOHYDRATE 100 MG/ML
INJECTION, SOLUTION INTRAVENOUS CONTINUOUS PRN
Status: DISCONTINUED | OUTPATIENT
Start: 2024-05-29 | End: 2024-05-30

## 2024-05-29 RX ORDER — FLUTICASONE PROPIONATE 50 MCG
2 SPRAY, SUSPENSION (ML) NASAL DAILY
Status: DISCONTINUED | OUTPATIENT
Start: 2024-05-30 | End: 2024-06-05 | Stop reason: ALTCHOICE

## 2024-05-29 RX ORDER — LEVETIRACETAM 500 MG/1
500 TABLET ORAL DAILY
Status: DISCONTINUED | OUTPATIENT
Start: 2024-05-30 | End: 2024-06-15 | Stop reason: HOSPADM

## 2024-05-29 RX ORDER — FLUTICASONE PROPIONATE 50 MCG
2 SPRAY, SUSPENSION (ML) NASAL DAILY
Status: CANCELLED | OUTPATIENT
Start: 2024-05-30

## 2024-05-29 RX ORDER — BISACODYL 10 MG
10 SUPPOSITORY, RECTAL RECTAL DAILY PRN
Status: CANCELLED | OUTPATIENT
Start: 2024-05-29

## 2024-05-29 RX ORDER — ATORVASTATIN CALCIUM 10 MG/1
10 TABLET, FILM COATED ORAL DAILY
Status: DISCONTINUED | OUTPATIENT
Start: 2024-05-30 | End: 2024-06-05 | Stop reason: ALTCHOICE

## 2024-05-29 RX ORDER — TRAZODONE HYDROCHLORIDE 50 MG/1
50 TABLET ORAL NIGHTLY PRN
Status: CANCELLED | OUTPATIENT
Start: 2024-05-29

## 2024-05-29 RX ORDER — POLYETHYLENE GLYCOL 3350 17 G/17G
17 POWDER, FOR SOLUTION ORAL DAILY PRN
Status: DISCONTINUED | OUTPATIENT
Start: 2024-05-29 | End: 2024-06-15 | Stop reason: HOSPADM

## 2024-05-29 RX ORDER — BISACODYL 10 MG
10 SUPPOSITORY, RECTAL RECTAL DAILY PRN
Status: DISCONTINUED | OUTPATIENT
Start: 2024-05-29 | End: 2024-06-15 | Stop reason: HOSPADM

## 2024-05-29 RX ORDER — TRAZODONE HYDROCHLORIDE 50 MG/1
50 TABLET ORAL NIGHTLY PRN
Status: DISCONTINUED | OUTPATIENT
Start: 2024-05-29 | End: 2024-05-30

## 2024-05-29 RX ORDER — ENOXAPARIN SODIUM 100 MG/ML
30 INJECTION SUBCUTANEOUS DAILY
Status: DISCONTINUED | OUTPATIENT
Start: 2024-05-29 | End: 2024-05-29 | Stop reason: ALTCHOICE

## 2024-05-29 RX ORDER — ONDANSETRON 4 MG/1
4 TABLET, ORALLY DISINTEGRATING ORAL 3 TIMES DAILY PRN
Status: DISCONTINUED | OUTPATIENT
Start: 2024-05-29 | End: 2024-06-15 | Stop reason: HOSPADM

## 2024-05-29 RX ORDER — PRIMIDONE 50 MG/1
50 TABLET ORAL DAILY
Status: CANCELLED | OUTPATIENT
Start: 2024-05-30

## 2024-05-29 RX ORDER — SODIUM CHLORIDE 0.9 % (FLUSH) 0.9 %
5-40 SYRINGE (ML) INJECTION PRN
Status: DISCONTINUED | OUTPATIENT
Start: 2024-05-29 | End: 2024-06-15 | Stop reason: HOSPADM

## 2024-05-29 RX ORDER — BUPROPION HYDROCHLORIDE 300 MG/1
300 TABLET ORAL NIGHTLY
Status: DISCONTINUED | OUTPATIENT
Start: 2024-05-29 | End: 2024-06-15 | Stop reason: HOSPADM

## 2024-05-29 RX ORDER — POLYETHYLENE GLYCOL 3350 17 G/17G
17 POWDER, FOR SOLUTION ORAL DAILY PRN
Status: CANCELLED | OUTPATIENT
Start: 2024-05-29

## 2024-05-29 RX ORDER — LANOLIN ALCOHOL/MO/W.PET/CERES
6 CREAM (GRAM) TOPICAL NIGHTLY
Status: DISCONTINUED | OUTPATIENT
Start: 2024-05-29 | End: 2024-05-30

## 2024-05-29 RX ORDER — PRIMIDONE 50 MG/1
50 TABLET ORAL DAILY
Status: DISCONTINUED | OUTPATIENT
Start: 2024-05-30 | End: 2024-06-15 | Stop reason: HOSPADM

## 2024-05-29 RX ORDER — AMLODIPINE BESYLATE 5 MG/1
5 TABLET ORAL DAILY
Status: DISCONTINUED | OUTPATIENT
Start: 2024-05-30 | End: 2024-06-03

## 2024-05-29 RX ORDER — HYDRALAZINE HYDROCHLORIDE 10 MG/1
10 TABLET, FILM COATED ORAL EVERY 6 HOURS PRN
Status: CANCELLED | OUTPATIENT
Start: 2024-05-29

## 2024-05-29 RX ORDER — LANOLIN ALCOHOL/MO/W.PET/CERES
6 CREAM (GRAM) TOPICAL NIGHTLY
Status: CANCELLED | OUTPATIENT
Start: 2024-05-29

## 2024-05-29 RX ORDER — HEPARIN SODIUM 5000 [USP'U]/ML
5000 INJECTION, SOLUTION INTRAVENOUS; SUBCUTANEOUS EVERY 12 HOURS SCHEDULED
Status: DISCONTINUED | OUTPATIENT
Start: 2024-05-29 | End: 2024-06-15 | Stop reason: HOSPADM

## 2024-05-29 RX ORDER — ACETAMINOPHEN 325 MG/1
650 TABLET ORAL EVERY 4 HOURS PRN
Status: CANCELLED | OUTPATIENT
Start: 2024-05-29

## 2024-05-29 RX ORDER — ACETAMINOPHEN 325 MG/1
650 TABLET ORAL EVERY 4 HOURS PRN
Status: DISCONTINUED | OUTPATIENT
Start: 2024-05-29 | End: 2024-06-15 | Stop reason: HOSPADM

## 2024-05-29 RX ORDER — DOCUSATE SODIUM 100 MG/1
100 CAPSULE, LIQUID FILLED ORAL 3 TIMES DAILY
Status: CANCELLED | OUTPATIENT
Start: 2024-05-29

## 2024-05-29 RX ORDER — LEVETIRACETAM 500 MG/1
500 TABLET ORAL DAILY
Status: CANCELLED | OUTPATIENT
Start: 2024-05-30

## 2024-05-29 RX ORDER — DOCUSATE SODIUM 100 MG/1
100 CAPSULE, LIQUID FILLED ORAL 3 TIMES DAILY
Status: DISCONTINUED | OUTPATIENT
Start: 2024-05-29 | End: 2024-06-04

## 2024-05-29 RX ORDER — SODIUM CHLORIDE 9 MG/ML
INJECTION, SOLUTION INTRAVENOUS PRN
Status: CANCELLED | OUTPATIENT
Start: 2024-05-29

## 2024-05-29 RX ORDER — GLUCAGON 1 MG/ML
1 KIT INJECTION PRN
Status: DISCONTINUED | OUTPATIENT
Start: 2024-05-29 | End: 2024-05-30

## 2024-05-29 RX ORDER — ACETAMINOPHEN 325 MG/1
650 TABLET ORAL EVERY 6 HOURS
Status: DISCONTINUED | OUTPATIENT
Start: 2024-05-29 | End: 2024-06-15 | Stop reason: HOSPADM

## 2024-05-29 RX ORDER — ATORVASTATIN CALCIUM 10 MG/1
10 TABLET, FILM COATED ORAL DAILY
Status: CANCELLED | OUTPATIENT
Start: 2024-05-30

## 2024-05-29 RX ORDER — HYDRALAZINE HYDROCHLORIDE 10 MG/1
10 TABLET, FILM COATED ORAL EVERY 6 HOURS PRN
Status: DISCONTINUED | OUTPATIENT
Start: 2024-05-29 | End: 2024-06-15 | Stop reason: HOSPADM

## 2024-05-29 RX ORDER — POLYETHYLENE GLYCOL 3350 17 G/17G
17 POWDER, FOR SOLUTION ORAL DAILY
Status: DISCONTINUED | OUTPATIENT
Start: 2024-05-30 | End: 2024-06-04

## 2024-05-29 RX ORDER — SENNOSIDES A AND B 8.6 MG/1
1 TABLET, FILM COATED ORAL NIGHTLY
Status: CANCELLED | OUTPATIENT
Start: 2024-05-29

## 2024-05-29 RX ADMIN — AMLODIPINE BESYLATE 5 MG: 5 TABLET ORAL at 07:47

## 2024-05-29 RX ADMIN — LEVETIRACETAM 500 MG: 500 TABLET, FILM COATED ORAL at 07:47

## 2024-05-29 RX ADMIN — SERTRALINE HYDROCHLORIDE 50 MG: 50 TABLET ORAL at 07:47

## 2024-05-29 RX ADMIN — DOCUSATE SODIUM 100 MG: 100 CAPSULE, LIQUID FILLED ORAL at 20:54

## 2024-05-29 RX ADMIN — BUPROPION HYDROCHLORIDE 150 MG: 150 TABLET, EXTENDED RELEASE ORAL at 07:47

## 2024-05-29 RX ADMIN — BUPROPION HYDROCHLORIDE 300 MG: 300 TABLET, EXTENDED RELEASE ORAL at 20:55

## 2024-05-29 RX ADMIN — DOCUSATE SODIUM 100 MG: 100 CAPSULE, LIQUID FILLED ORAL at 07:47

## 2024-05-29 RX ADMIN — SENNOSIDES 8.6 MG: 8.6 TABLET ORAL at 20:54

## 2024-05-29 RX ADMIN — Medication 6 MG: at 20:55

## 2024-05-29 RX ADMIN — POLYETHYLENE GLYCOL 3350 17 G: 17 POWDER, FOR SOLUTION ORAL at 07:47

## 2024-05-29 RX ADMIN — SEVELAMER CARBONATE 800 MG: 800 TABLET, FILM COATED ORAL at 07:47

## 2024-05-29 RX ADMIN — PRIMIDONE 50 MG: 50 TABLET ORAL at 07:47

## 2024-05-29 RX ADMIN — FLUTICASONE PROPIONATE 2 SPRAY: 50 SPRAY, METERED NASAL at 07:47

## 2024-05-29 RX ADMIN — ATORVASTATIN CALCIUM 10 MG: 10 TABLET, FILM COATED ORAL at 07:47

## 2024-05-29 RX ADMIN — HEPARIN SODIUM 5000 UNITS: 5000 INJECTION INTRAVENOUS; SUBCUTANEOUS at 20:55

## 2024-05-29 RX ADMIN — SEVELAMER CARBONATE 800 MG: 800 TABLET, FILM COATED ORAL at 17:52

## 2024-05-29 RX ADMIN — ACETAMINOPHEN 650 MG: 325 TABLET ORAL at 20:55

## 2024-05-29 RX ADMIN — SEVELAMER CARBONATE 800 MG: 800 TABLET, FILM COATED ORAL at 12:01

## 2024-05-29 ASSESSMENT — PAIN SCALES - GENERAL: PAINLEVEL_OUTOF10: 0

## 2024-05-29 ASSESSMENT — LIFESTYLE VARIABLES: HOW OFTEN DO YOU HAVE A DRINK CONTAINING ALCOHOL: NEVER

## 2024-05-29 NOTE — DISCHARGE INSTR - DIET
Good nutrition is important when healing from an illness, injury, or surgery.  Follow any nutrition recommendations given to you during your hospital stay.   If you were given an oral nutrition supplement while in the hospital, continue to take this supplement at home.  You can take it with meals, in-between meals, and/or before bedtime. These supplements can be purchased at most local grocery stores, pharmacies, and chain [employer]-stores.   If you have any questions about your diet or nutrition, call the hospital and ask for the dietitian.    May resume previous diet

## 2024-05-29 NOTE — FLOWSHEET NOTE
05/29/24 1245 05/29/24 1535   Vital Signs   BP (!) 150/76 (!) 161/84   Temp 98.6 °F (37 °C) 98 °F (36.7 °C)   Pulse 93 (!) 102   Respirations 18 18   SpO2 97 % 97 %   Weight - Scale 43.5 kg (95 lb 14.4 oz) 43.5 kg (95 lb 14.4 oz)   Weight Method Bed scale Bed scale   Percent Weight Change -3.12 0   Post-Hemodialysis Assessment   Post-Treatment Procedures  --  Blood returned;Catheter Capped, clamped with Saline x2 ports   Machine Disinfection Process  --  Acid/Vinegar Clean;Heat Disinfect;Exterior Machine Disinfection   Blood Volume Processed (Liters)  --  54.6 L   Dialyzer Clearance  --  Lightly streaked   Duration of Treatment (minutes)  --  150 minutes   Heparin Amount Administered During Treatment (mL)  --  0 mL   Hemodialysis Intake (ml)  --  400 ml   Hemodialysis Output (ml)  --  400 ml   NET Removed (ml)  --  0     2.5 hour treatment completed. No fluid removed patient under EDW. Patient with some occasional confusion, but reoriented easily. Patient noted to be hypertensive at times. Cath lines flushed with 10 ml of 0.9 NS, clamped and tego secured. Report given to primary RN. Charting printed and placed in bin to be scanned into EMR.

## 2024-05-29 NOTE — DISCHARGE SUMMARY
(from the past 72 hour(s))   Potassium    Collection Time: 05/26/24 12:26 PM   Result Value Ref Range    Potassium 3.9 3.5 - 5.2 meq/L   Phosphorus    Collection Time: 05/27/24  6:36 AM   Result Value Ref Range    Phosphorus 6.7 (H) 2.4 - 4.7 mg/dL   Magnesium    Collection Time: 05/27/24  6:36 AM   Result Value Ref Range    Magnesium 2.3 1.6 - 2.4 mg/dL   CBC    Collection Time: 05/27/24  6:36 AM   Result Value Ref Range    WBC 10.1 4.8 - 10.8 thou/mm3    RBC 3.74 (L) 4.20 - 5.40 mill/mm3    Hemoglobin 11.8 (L) 12.0 - 16.0 gm/dl    Hematocrit 43.0 37.0 - 47.0 %    .0 (H) 81.0 - 99.0 fL    MCH 31.6 26.0 - 33.0 pg    MCHC 27.4 (L) 32.2 - 35.5 gm/dl    RDW-CV 17.3 (H) 11.5 - 14.5 %    RDW-SD 73.0 (H) 35.0 - 45.0 fL    Platelets 232 130 - 400 thou/mm3    MPV 10.6 9.4 - 12.4 fL   Basic Metabolic Panel    Collection Time: 05/27/24  6:36 AM   Result Value Ref Range    Sodium 137 135 - 145 meq/L    Potassium 4.3 3.5 - 5.2 meq/L    Chloride 103 98 - 111 meq/L    CO2 16 (L) 23 - 33 meq/L    Glucose 79 70 - 108 mg/dL    BUN 43 (H) 7 - 22 mg/dL    Creatinine 6.0 (HH) 0.4 - 1.2 mg/dL    Calcium 8.7 8.5 - 10.5 mg/dL   Anion Gap    Collection Time: 05/27/24  6:36 AM   Result Value Ref Range    Anion Gap 18.0 (H) 8.0 - 16.0 meq/L   Glomerular Filtration Rate, Estimated    Collection Time: 05/27/24  6:36 AM   Result Value Ref Range    Est, Glom Filt Rate 7 (A) >60 ml/min/1.73m2   SPECIMEN REJECTION    Collection Time: 05/27/24  7:06 AM   Result Value Ref Range    Rejected Test ical     Reason for Rejection see below    Calcium, Ionized    Collection Time: 05/27/24  7:14 AM   Result Value Ref Range    Calcium, Ionized 1.01 (L) 1.12 - 1.32 mmol/L   Drug screen multi urine    Collection Time: 05/27/24  8:15 AM   Result Value Ref Range    Amphetamine+Methamphetamine Urine Screen Negative NEGATIVE    Barbiturate Quant, Ur POSITIVE NEGATIVE    Benzodiazepine Quant, Ur Negative NEGATIVE    Cannabinoid Quant, Ur Negative NEGATIVE

## 2024-05-29 NOTE — PLAN OF CARE
Problem: Discharge Planning  Goal: Discharge to home or other facility with appropriate resources  5/27/2024 2244 by Lorna Prieto RN  Flowsheets (Taken 5/27/2024 2135)  Discharge to home or other facility with appropriate resources:   Identify barriers to discharge with patient and caregiver   Arrange for needed discharge resources and transportation as appropriate   Identify discharge learning needs (meds, wound care, etc)   Arrange for interpreters to assist at discharge as needed   Refer to discharge planning if patient needs post-hospital services based on physician order or complex needs related to functional status, cognitive ability or social support system  5/27/2024 1256 by Gissell Sanford, RN  Outcome: Progressing  Flowsheets (Taken 5/27/2024 1256)  Discharge to home or other facility with appropriate resources: Identify barriers to discharge with patient and caregiver     Problem: Pain  Goal: Verbalizes/displays adequate comfort level or baseline comfort level  5/27/2024 2244 by Lorna Prieto RN  Flowsheets (Taken 5/27/2024 2244)  Verbalizes/displays adequate comfort level or baseline comfort level:   Notify Licensed Independent Practitioner if interventions unsuccessful or patient reports new pain   Consider cultural and social influences on pain and pain management   Implement non-pharmacological measures as appropriate and evaluate response   Administer analgesics based on type and severity of pain and evaluate response   Assess pain using appropriate pain scale   Encourage patient to monitor pain and request assistance  5/27/2024 1256 by Gissell Sanford, RN  Outcome: Progressing  Flowsheets (Taken 5/27/2024 1256)  Verbalizes/displays adequate comfort level or baseline comfort level: Encourage patient to monitor pain and request assistance     Problem: Safety - Adult  Goal: Free from fall injury  5/27/2024 2244 by Lorna Prieto RN  Flowsheets (Taken 5/27/2024 2244)  Free 
  Problem: Discharge Planning  Goal: Discharge to home or other facility with appropriate resources  5/28/2024 1043 by Yaneli Hester RN  Outcome: Progressing  Flowsheets (Taken 5/28/2024 1043)  Discharge to home or other facility with appropriate resources:   Identify barriers to discharge with patient and caregiver   Arrange for needed discharge resources and transportation as appropriate   Identify discharge learning needs (meds, wound care, etc)     Problem: Pain  Goal: Verbalizes/displays adequate comfort level or baseline comfort level  5/28/2024 1043 by Yaneli Hester RN  Outcome: Progressing  Flowsheets (Taken 5/28/2024 1043)  Verbalizes/displays adequate comfort level or baseline comfort level:   Encourage patient to monitor pain and request assistance   Assess pain using appropriate pain scale   Administer analgesics based on type and severity of pain and evaluate response     Problem: Safety - Adult  Goal: Free from fall injury  5/28/2024 1043 by Yaneli Hester RN  Outcome: Progressing  Flowsheets (Taken 5/28/2024 1043)  Free From Fall Injury: Instruct family/caregiver on patient safety     Problem: Chronic Conditions and Co-morbidities  Goal: Patient's chronic conditions and co-morbidity symptoms are monitored and maintained or improved  5/28/2024 1043 by Yaneli Hester RN  Outcome: Progressing  Flowsheets (Taken 5/28/2024 1043)  Care Plan - Patient's Chronic Conditions and Co-Morbidity Symptoms are Monitored and Maintained or Improved:   Monitor and assess patient's chronic conditions and comorbid symptoms for stability, deterioration, or improvement   Collaborate with multidisciplinary team to address chronic and comorbid conditions and prevent exacerbation or deterioration   Update acute care plan with appropriate goals if chronic or comorbid symptoms are exacerbated and prevent overall improvement and discharge     Problem: Skin/Tissue Integrity  Goal: Absence of new skin breakdown  Description: 1.  
  Problem: Discharge Planning  Goal: Discharge to home or other facility with appropriate resources  5/29/2024 0727 by Paula Foley, MSW, LSW  Outcome: Progressing  Flowsheets (Taken 5/29/2024 0727)  Discharge to home or other facility with appropriate resources: Identify barriers to discharge with patient and caregiver  Note: Possible inpatient rehab, see SW notes 5/29/24.    
  Problem: Discharge Planning  Goal: Discharge to home or other facility with appropriate resources  Outcome: Progressing  Flowsheets (Taken 5/27/2024 1256)  Discharge to home or other facility with appropriate resources: Identify barriers to discharge with patient and caregiver     Problem: Pain  Goal: Verbalizes/displays adequate comfort level or baseline comfort level  Outcome: Progressing  Flowsheets (Taken 5/27/2024 1256)  Verbalizes/displays adequate comfort level or baseline comfort level: Encourage patient to monitor pain and request assistance     Problem: Safety - Adult  Goal: Free from fall injury  Outcome: Progressing  Flowsheets (Taken 5/27/2024 1256)  Free From Fall Injury: Instruct family/caregiver on patient safety     Problem: Chronic Conditions and Co-morbidities  Goal: Patient's chronic conditions and co-morbidity symptoms are monitored and maintained or improved  Outcome: Progressing  Flowsheets (Taken 5/27/2024 1256)  Care Plan - Patient's Chronic Conditions and Co-Morbidity Symptoms are Monitored and Maintained or Improved: Monitor and assess patient's chronic conditions and comorbid symptoms for stability, deterioration, or improvement     Problem: Skin/Tissue Integrity  Goal: Absence of new skin breakdown  Description: 1.  Monitor for areas of redness and/or skin breakdown  2.  Assess vascular access sites hourly  3.  Every 4-6 hours minimum:  Change oxygen saturation probe site  4.  Every 4-6 hours:  If on nasal continuous positive airway pressure, respiratory therapy assess nares and determine need for appliance change or resting period.  Outcome: Progressing     Problem: Neurosensory - Adult  Goal: Achieves stable or improved neurological status  Outcome: Progressing  Flowsheets (Taken 5/27/2024 1256)  Achieves stable or improved neurological status: Assess for and report changes in neurological status     Problem: Neurosensory - Adult  Goal: Absence of seizures  Outcome: 
  Problem: Discharge Planning  Goal: Discharge to home or other facility with appropriate resources  Outcome: Progressing  Flowsheets (Taken 5/28/2024 1043 by Yaneli Hester, RN)  Discharge to home or other facility with appropriate resources:   Identify barriers to discharge with patient and caregiver   Arrange for needed discharge resources and transportation as appropriate   Identify discharge learning needs (meds, wound care, etc)     Problem: Pain  Goal: Verbalizes/displays adequate comfort level or baseline comfort level  Outcome: Progressing  Flowsheets (Taken 5/28/2024 1043 by Yaneli Hester, RN)  Verbalizes/displays adequate comfort level or baseline comfort level:   Encourage patient to monitor pain and request assistance   Assess pain using appropriate pain scale   Administer analgesics based on type and severity of pain and evaluate response     Problem: Safety - Adult  Goal: Free from fall injury  Outcome: Progressing  Flowsheets (Taken 5/28/2024 1043 by Yaneli Hester, RN)  Free From Fall Injury: Instruct family/caregiver on patient safety     Problem: Chronic Conditions and Co-morbidities  Goal: Patient's chronic conditions and co-morbidity symptoms are monitored and maintained or improved  Outcome: Progressing  Flowsheets (Taken 5/28/2024 1043 by Yaneli Hester, RN)  Care Plan - Patient's Chronic Conditions and Co-Morbidity Symptoms are Monitored and Maintained or Improved:   Monitor and assess patient's chronic conditions and comorbid symptoms for stability, deterioration, or improvement   Collaborate with multidisciplinary team to address chronic and comorbid conditions and prevent exacerbation or deterioration   Update acute care plan with appropriate goals if chronic or comorbid symptoms are exacerbated and prevent overall improvement and discharge     Problem: Skin/Tissue Integrity  Goal: Absence of new skin breakdown  Description: 1.  Monitor for areas of redness and/or skin breakdown  2.  Assess 
AM

## 2024-05-29 NOTE — PROGRESS NOTES
Physical Medicine & Rehabilitation Progress Note    Chief Complaint:  Fall resulting subdural hematoma     Subjective:    Deloris Watts is a 68 y.o.  right-handed slim  female with history of hypertension, hyperlipidemia, hydronephrosis, fibromyalgia, irritable bowel syndrome, essential tremor, osteoporosis, end-stage renal disease on hemodialysis (MWF) since January 2023, stroke with left side weakness in 1990s, anemia, depression, anxiety, right wrist and right ankle fracture treated conservatively, right proximal humeral fracture due to fall requiring ORIF, status post right carpal tunnel release surgery, status post cervical spine surgery, status post hysterectomy and bilateral oophorectomy, hemorrhoidectomy, sinus surgery, tubal ligation, L2 compression fracture requiring kyphoplasty, LASEK surgery, left femur neck displaced fracture due to fall on 3/10/2024 requiring left hip hemiarthroplasty on 3/11/2024, was admitted on 5/29/2024 for intensive inpatient management of impairment & disability secondary to fall resulting acute left parafalcine subdural hematoma.     The patient is known to inpatient rehab service.  She was previously in our inpatient rehab service from 12/21/2023 to 1/5/2024 after a fall accident on 12/14/2023 resulting L2 compression fracture requiring kyphoplasty, and liver laceration.  She was discharged to home on 1/5/2024 with referral for outpatient PT, OT and speech therapy.  She again sustained a fall accident at home bathroom on 3/10/2024 resulting left femur neck displaced fracture requiring left hip hemiarthroplasty done on 3/11/2024.  She then was admitted to inpatient rehab service again on 3/14/2024.  She was discharged home on 3/30/2024 with referral for outpatient PT and OT treatment after discharge.     The patient's  says he assisted the patient to sit on her rollator walker seat in living room and then pushing the rollator walker to bring the patient back

## 2024-05-29 NOTE — PROGRESS NOTES
Admitted to the Inpatient Rehabilitation Unit via bed.  Patient was then oriented to room and unit.  Education provided on the rehabilitation routine: three hours of therapy five days per week.      Explained patients right to have family, representative or physician notified of their admission.  Patient has Declined for physician to be notified.  Patient has Declined for family/representative to be notified.    Admitting medication orders compared with acute stay medications; home medication list reviewed with patient/family.  Medication issues identified No    Transportation:   Has transportation kept you from medical appointments, meetings, work, or from getting things needed for daily living? (Check all that apply)  No.      Health Literacy:   How often do you need to have someone help you when you read instructions, pamphlets, or other written material from your doctor or pharmacy?  3. - Often    Social Isolation:  How often do you feel lonely or isolated from those around you?  0. Never    Patient Mood Interview (PHQ-2 to 9) (from [employer]©)   Say to Patient: \"Over the last 2 weeks, have you been bothered by any of the following problems?\"   If symptom is present, enter yes in column 1 (Symptom Presence)  If yes in column 1, then ask the patient: “About how often have you been bothered by this?” Indicate response in column 2, Symptom Frequency.   Symptom Presence  No    Yes   9.    No response  Symptom Frequency  Never or 1 day  2-6 days (several days)  7-11 days (half or more of the days)  12-14 days (nearly every day)    Symptom Presence Symptom Frequency   Little interest or pleasure in doing things 0. No 0. Never or 1 day   Feeling down, depressed, or hopeless 0. No 0. Never or 1 day   If either A or B above has symptom frequency coded 2 or 3, CONTINUE asking questions below.      If not, END the interview  and right click on next table to delete.               Pain:  Pain Effect on Sleep    Ask

## 2024-05-29 NOTE — DISCHARGE INSTR - COC
Resolved    COVID-19 04/10/24 04/10/24 04/10/24 COVID-19 & Influenza Combo   24 Infection     COVID-19 22 POCT COVID-19 Rapid, NAAT   22 Infection                        Nurse Assessment:  Last Vital Signs: BP (!) 145/77   Pulse 74   Temp 97.7 °F (36.5 °C) (Oral)   Resp 18   Ht 1.524 m (5')   Wt 44.9 kg (98 lb 15.8 oz)   SpO2 96%   BMI 19.33 kg/m²     Last documented pain score (0-10 scale): Pain Level: 0  Last Weight:   Wt Readings from Last 1 Encounters:   24 44.9 kg (98 lb 15.8 oz)     Mental Status:  {IP PT MENTAL STATUS:}    IV Access:  { YAN IV ACCESS:512982322}    Nursing Mobility/ADLs:  Walking   {CHP DME ADLs:179879179}  Transfer  {CHP DME ADLs:902188446}  Bathing  {CHP DME ADLs:720420482}  Dressing  {CHP DME ADLs:322592053}  Toileting  {CHP DME ADLs:536305179}  Feeding  {CHP DME ADLs:065806687}  Med Admin  {CHP DME ADLs:245491430}  Med Delivery   { YAN MED Delivery:271421709}    Wound Care Documentation and Therapy:  Incision 24 Hip Left (Active)   Number of days: 78        Elimination:  Continence:   Bowel: {YES / NO:}  Bladder: {YES / NO:}  Urinary Catheter: {Urinary Catheter:372964755}   Colostomy/Ileostomy/Ileal Conduit: {YES / NO:}       Date of Last BM: ***    Intake/Output Summary (Last 24 hours) at 2024 0929  Last data filed at 2024 0817  Gross per 24 hour   Intake 580 ml   Output 0 ml   Net 580 ml     I/O last 3 completed shifts:  In: 730 [P.O.:730]  Out: 0     Safety Concerns:     { YAN Safety Concerns:272113327}    Impairments/Disabilities:      { YAN Impairments/Disabilities:059258288}    Nutrition Therapy:  Current Nutrition Therapy:   { YAN Diet List:988509828}    Routes of Feeding: {CHP DME Other Feedings:614267864}  Liquids: {Slp liquid thickness:92774}  Daily Fluid Restriction: {CHP DME Yes amt example:484502938}  Last Modified Barium Swallow with Video (Video Swallowing Test):

## 2024-05-29 NOTE — H&P
treated conservatively, right proximal humeral fracture due to fall requiring ORIF, status post right carpal tunnel release surgery, status post cervical spine surgery, status post hysterectomy and bilateral oophorectomy, hemorrhoidectomy, sinus surgery, tubal ligation, L2 compression fracture requiring kyphoplasty, LASEK surgery, left femur neck displaced fracture due to fall on 3/10/2024 requiring left hip hemiarthroplasty on 3/11/2024, is admitted to the inpatient rehabilitation unit on 5/29/2024 for intensive inpatient rehabilitation treatment of impairment and disability secondary to fall resulting acute left parafalcine subdural hematoma.    The patient is known to inpatient rehab service.  She was previously in our inpatient rehab service from 12/21/2023 to 1/5/2024 after a fall accident on 12/14/2023 resulting L2 compression fracture requiring kyphoplasty, and liver laceration.  She was discharged to home on 1/5/2024 with referral for outpatient PT, OT and speech therapy.  She again sustained a fall accident at home bathroom on 3/10/2024 resulting left femur neck displaced fracture requiring left hip hemiarthroplasty done on 3/11/2024.  She then was admitted to inpatient rehab service again on 3/14/2024.  She was discharged home on 3/30/2024 with referral for outpatient PT and OT treatment after discharge.     The patient's  says he assisted the patient to sit on her rollator walker seat in living room and then pushing the rollator walker to bring the patient back to bedroom on 5/26/2024.  When they were near the bedroom door on the hallway, the patient suddenly slipped off the rollator walker seat and fell down to the floor with her head hitting the carpeted floor of the bedroom entrance.  She did not loss consciousness but felt some headache afterwards.  Her  called 911 and the patient was sent to [facility] ER for evaluation.  X-ray of pelvis and left femur performed on  contrast (5/26/2024) :  IMPRESSION:  No evidence of cervical spine injury.          CT of head without contrast (5/26/2024, 12:08 PM) :  IMPRESSION:   1. Stable CT scan of the brain, no interval change since previous study on the  same day at 0027 hours.          EKG (5/26/2024) :  Narrative & Impression  Normal sinus rhythm  Right bundle branch block  Abnormal ECG  When compared with ECG of 06-MAY-2024 15:28,  No significant change was found            The post admission physician evaluation (CRISTINE) is consistent with the pre-admission assessment.  See above findings to reflect the elements required in the CRISTINE.  Patient's admitting condition is consistent with the findings of the preadmission assessment by the rehabilitation admissions coordinator.    ANTON DAVIS MD        This report has been created using voice recognition software. It may contain minor errors which are inherent in voice recognition technology

## 2024-05-29 NOTE — CARE COORDINATION
05/26/24 1020   Readmission Assessment   Number of Days since last admission? 8-30 days   Previous Disposition Home with Family  (and outpatient services)   Who is being Interviewed Caregiver  (Tenzin)   What was the patient's/caregiver's perception as to why they think they needed to return back to the hospital? Other (Comment)  (fall)   Did you visit your Primary Care Physician after you left the hospital, before you returned this time? Yes   Did you see a specialist, such as Cardiac, Pulmonary, Orthopedic Physician, etc. after you left the hospital? Yes   Who advised the patient to return to the hospital? Self-referral   Does the patient report anything that got in the way of taking their medications? No   What reasons did they give? Other (Comment)  (n/a)   In our efforts to provide the best possible care to you and others like you, can you think of anything that we could have done to help you after you left the hospital the first time, so that you might not have needed to return so soon? Other (Comment)  (Tenzin doesn't report anything, but would like to consider dc to SNF ([facility]) at discharge this time.)       
5/29/24, 12:58 PM EDT    Patient goals/plan/ treatment preferences discussed by  and .  Patient goals/plan/ treatment preferences reviewed with patient/ family.  Patient/ family verbalize understanding of discharge plan and are in agreement with goal/plan/treatment preferences.  Understanding was demonstrated using the teach back method.  AVS provided by RN at time of discharge, which includes all necessary medical information pertaining to the patients current course of illness, treatment, post-discharge goals of care, and treatment preferences.     Services At/After Discharge: Inpatient rehab    Patient approved for inpatient rehab and will discharge there today after dialysis.        
5/29/24, 7:22 AM EDT    DISCHARGE PLANNING EVALUATION     SW received message yesterday from Vonda at [facility] and they had spoke with spouse regarding Patient possibly returning at discharge. Vonda stated that she would evaluate her bed situation and contact SW after their morning meeting.     SW consult received for possible need for rehab at discharge and spouse speaking with RN regarding possible [facility].     SW spoke with Patient and spouse and they were spoke with yesterday in regards to possible inpatient rehab for Patient at discharge. Spouse is very interested in inpatient rehab and did go to the floor to speak with them about this being her best option at discharge. Spouse stated that if Patient is not able to go to inpatient rehab then [facility] would [facility].     VARSHA reviewed notes today and Patient appears to be a candidate for inpatient rehab at this time.   
5/29/24, 8:47 AM EDT    Patient goals/plan/ treatment preferences discussed by  and .  Patient goals/plan/ treatment preferences reviewed with patient/ family.  Patient/ family verbalize understanding of discharge plan and are in agreement with goal/plan/treatment preferences.  Understanding was demonstrated using the teach back method.  AVS provided by RN at time of discharge, which includes all necessary medical information pertaining to the patients current course of illness, treatment, post-discharge goals of care, and treatment preferences.     Services At/After Discharge: Inpatient rehab    Deloris is discharging to [address] [place] [address] some time today. CM notified Dr Duran. Felice Whiting will call when bed is ready. At that time, and after orders are in, primary RN to call report to extension [phone].          
PT/OT/SLP at the [facility] [place] T/Th, but Tenzin reports she has missed her last 4 appointments due to weakness and fatigue. She has not missed dialysis.     Tenizn would like to consider rehab at the [facility] in a skilled bed. Deloris will not need a precert. He does have questions about her Medicare days in a SNF. SW consulted. CM let Tenzin know SW would be in Tuesday 5/28 for further planning.     If patient is discharged prior to next notation, then this note serves as note for discharge by case management.

## 2024-05-29 NOTE — PROGRESS NOTES
CRITICAL CARE MEDICINE Progress notes     Patient:  Deloris Watts    Unit/Bed:3A-11/011-A  MRN: [id number]   PCP: Johana Weldon MD  Date of Admission: 2024    Assessment and Plan(All pulmonary edema, renal failure, PE, and respiratory failure diagnoses are acute in nature unless otherwise specified):        Small acute left parafalcine subdural hematoma due to recurrent falls: Patient fell from wheelchair and hit head without loss of consciousness fall at home.  CT head on arrival showed small acute left parafalcine subdural hematoma.  X-ray femur, pelvis, CT C-spine all negative.  1 g TXA given in ED.  Admitted to ICU under trauma  Trauma primary team  Pain management per primary  SBP goal: 100-160.  Hydralazine as needed.   IV fluids discontinued   PT/OT following  Neurosurgery followin/26-Seizure precaution.  No acute neurosurgical intervention at this time.  New brain CT after 1 week and follow-up results with patient's PCP.  Okay to resume Lovenox for DVT prophylaxis.  Continue Keppra 500 mg daily  Fall precautions  ESRD on HD (MWF): Patient follows with Dr. Palencia outpatient.  Nephrology following: Patient undergo hemodialysis today.  UF 1 L, 3K bath.  Monitor BMP  Troponinemia secondary to ESRD: On arrival troponin 58.  Baseline troponin: 60.  Patient denies any chest pain on admission.  EKG on admission was normal sinus rhythm.  If patient develops chest pain: Order EKG and troponin  Hypertension: Home medication: Norvasc  Continue Norvasc 5 mg daily  Continue hydralazine 10 mg every 6 hours as needed  Hyperphosphatemia: Nephrology following: Resume Renvela 800 mg 3 times daily  Hypocalcemia:  calcium 8.7, iCal 1.01.  Nephrology following.  Chronic microcytic anemia: Baseline hemoglobin 8-10.  Hemoglobin on arrival 12.9.  .  Monitor with CBC  Chronic diastolic HFpEF: TTE 2023 showed EF 60% with no regional left ventricular wall motion abnormalities and wall thickness 
[facility]  Acute Inpatient Rehab Preadmission Assessment    Patient Name: Deloris Watts        Ethnicity:Not of , /a, or Filipino origin  Race:White  MRN: [id number]    : 1955  (68 y.o.)  Gender: female     Admitted from[facility]  Initial Assessment    Date of admission to the hospital: 2024 11:44 PM  Date patient eligible for admission:2024    Primary Diagnosis: Subdural hematoma      Did patient have surgery?  No    Physicians: Virgilio Duran MD, Dr. Whatley, Dr. Mosquera, Dr. Ballard, Dr. Garza     Risk for clinical complications/co-morbidities:   Past Medical History:   Diagnosis Date    Allergic rhinitis     Anemia in CKD (chronic kidney disease)     Anxiety     CHF (congestive heart failure) (HCC)     Closed fracture of right proximal humerus 2021    ORIF    Closed right ankle fracture     In ; treated with casting    CVA (cerebral infarction)     in  ; with left-sided weakness    Depression     Fibromyalgia     in     Headache(784.0)     Hemiplegia and hemiparesis following cerebral infarction affecting left non-dominant side (HCC)     in     Hydronephrosis 2023    Urogenital Implants, calculus of ureter, UTI    Hyperlipidemia     Hypertension     in     Irritable bowel syndrome     in     Kidney failure     Chronic Kidney Disease, Renal Dialysis started in 2023    Osteoporosis 2019    Unspecified cerebral artery occlusion with cerebral infarction     Unspecified sleep apnea     Wrist fracture, right 2018    Treated with casting       Financial Information  Primary insurance: Medicare    Secondary Insurance:   BCBS Medicare Supplement     Has the patient had two or more falls in the past year or any fall with injury in the past year?   yes    Did the patient have major surgery during the 100 days prior to admission?  yes    Precautions: Restrictions/Precautions: Fall Risk, General Precautions  
[facility]  INPATIENT SPEECH THERAPY  STRZ RENAL TELEMETRY 6K  DAILY NOTE    TIME   SLP Individual Minutes  Time In: 1013  Time Out: 1038  Minutes: 25  Timed Code Treatment Minutes: 25 Minutes       Date: 2024  Patient Name: Deloris Watts      CSN: [id number]   : 1955  (68 y.o.)  Gender: female   Referring Physician:  Caren Galvez MD   Diagnosis: Subdural hemorrhage   Precautions: Fall risk, aspiration precautions, modified low stimulation guidelines/protocol   Current Diet: Regular, thin  Respiratory Status: Room Air  Swallowing Strategies: Standard Universal Swallow Precautions  Date of Last MBS/FEES: Not Applicable    Pain:  No pain reported.    Subjective:  FLY Groves approved ST session this date.  Patient finishing up patient care with tech upon  entry.  She was sat upright in bed and was agreeable to ST services.  Patient with notable fatigue at end of session this date.     Short-Term Goals:  SHORT TERM GOAL #1:  Goal 1: Patient will consume baseline diet of regular solids/thin liquids with stable pulmonary status and incorporation of trained compensatory strategies to assist with nutrition/hydration measures.  INTERVENTIONS:Goal not formally addressed this date.     SHORT TERM GOAL #2:  Goal 2: Patient will complete functional carryover tasks (O-log, biographics, call light) wt achievement of PTA clearance (25/30 x2-3 consecutive days) to ascertain ability to return to new learning skills.  INTERVENTIONS:  O-log  Score /30  City: multiple choice  Kind of Place: multiple choice  Name of Place: multiple choice  Month: multiple choice  Date: independent  Year: independent  Day of Week: 1  Clock Time: independent  Etiology: independent  Pathology: logical cueing    Biographics  Name: independent  : independent  Address: independent  Phone number: independent    Call light:   able to name 3 functions independently  Named 2 reasons to call RN with call light.   .    SHORT 
[facility]  SPEECH THERAPY  STRZ ICU 4D  Clinical Swallow Evaluation      SLP Individual Minutes  Time In: 1310  Time Out: 1324  Minutes: 14  Timed Code Treatment Minutes: 0 Minutes       DIET ORDER RECOMMENDATIONS AFTER EVALUATION: Minced and moist and thin liquids    Date: 2024  Patient Name: Deloris Watts      CSN: [id number]   : 1955  (68 y.o.)  Gender: female   Referring Physician:  Caren Galvez MD     Diagnosis: Subdural hemorrhage (HCC)    History of Present Illness/Injury: Deloris Watts is a 67 y/o female never-smoker with PMH of CHF, allergic rhinitis, anemia, ESRD on HD every MWF (follows with Dr. Cuadra), anxiety, depression, CVA (remote; residual left-sided weakness), fibromyalgia, HLD, HTN, IBS, osteoporosis. Patient had left raheel hip arthroplasty 3/11/24. She has a history of recurrent falls.      Patient presented overnight as a trauma alert following a fall. She hit her head when she fell in the bathroom. She denied LOC. She takes plavix for a prior CVA remotely. CT head showed a small acute left parafalcine subdural hematoma. Trauma team evaluated the patient. Patient had reported GCS of 14, which was reportedly her baseline per spouse. She has chronic left-sided deficits from prior stroke. She was given TXA. Patient was admitted to ICU for close neurologic monitoring. ICU team was consulted to assist in medical management.      H&P, consult, and overnight notes reviewed. Vitals, labs, medications, and radiographic exams reviewed. Patient assessed personally.      GCS remains 14. Patient chronically somewhat confused per spouse at bedside. Oriented to self, place; disoriented to time and somewhat to situation. No new focal deficits seen. Chronic left-sided weakness. Baseline tremors. Patient has no complaints this morning. Specifically denies headache, dizziness, facial droop, new weakness, speech difficulty, swallowing difficulty, visual disturbance.      Continue 
[facility]   Nephrology progress note  5/27/2024, 9:38 AM      Pt Name:    Deloris Watts  MRN:     [id number]     YOB: 1955  Admit Date:    5/25/2024 11:44 PM    Chief Complaint: Nephrology following for ESRD.    Subjective:  Patient was seen and examined this morning during hemodialysis.   Tolerating well, bp  161/90, UF 1.4 liters    Objective:  24HR INTAKE/OUTPUT:    Intake/Output Summary (Last 24 hours) at 5/27/2024 0938  Last data filed at 5/27/2024 0600  Gross per 24 hour   Intake 1160.65 ml   Output 550 ml   Net 610.65 ml         I/O last 3 completed shifts:  In: 1160.7 [P.O.:75; I.V.:672.3; IV Piggyback:413.3]  Out: 550 [Urine:550]  No intake/output data recorded.   Admission weight: 39.9 kg (88 lb)  Wt Readings from Last 3 Encounters:   05/27/24 45.9 kg (101 lb 3.1 oz)   05/13/24 39.9 kg (88 lb)   05/08/24 40.1 kg (88 lb 6.5 oz)        Vitals :   Vitals:    05/27/24 0300 05/27/24 0400 05/27/24 0745 05/27/24 0830   BP: (!) 129/53 (!) 147/58 (!) 154/70 (!) 161/90   Pulse: 95 94 95 92   Resp: 27 16 18 15   Temp:   98.3 °F (36.8 °C)    TempSrc:   Oral    SpO2: 97% 97% 98% 97%   Weight: 42.2 kg (93 lb 0.6 oz)   45.9 kg (101 lb 3.1 oz)   Height:           Physical examination  General Appearance: awake, no distress  Mouth/Throat: Oral mucosa moist  Neck: No JVD  Lungs: clear  Heart:  S1, S2 heard  GI: soft, non-tender, no guarding  Extremities: no LE edema    Medications:  Infusion:    sodium chloride      dextrose       Meds:    amLODIPine  5 mg Oral Daily    buPROPion  150 mg Oral QAM    buPROPion  300 mg Oral Nightly    docusate sodium  100 mg Oral TID    fluticasone  2 spray Each Nostril Daily    primidone  50 mg Oral Daily    sertraline  50 mg Oral Daily    atorvastatin  10 mg Oral Daily    sodium chloride flush  5-40 mL IntraVENous 2 times per day    polyethylene glycol  17 g Oral Daily    potassium chloride  40 mEq Oral Once    levETIRAcetam  500 mg IntraVENous Daily 
[facility]   Nephrology progress note  5/28/2024, 1:02 PM      Pt Name:    Deloris Watts  MRN:     [id number]     YOB: 1955  Admit Date:    5/25/2024 11:44 PM    Chief Complaint: Nephrology following for ESRD on hemodialysis.    Subjective:  Patient seen and examined  No chest pain or shortness of breath  Feels okay no other complaints    Objective:  24HR INTAKE/OUTPUT:    Intake/Output Summary (Last 24 hours) at 5/28/2024 1302  Last data filed at 5/28/2024 0412  Gross per 24 hour   Intake 300 ml   Output 100 ml   Net 200 ml      Admission weight: 39.9 kg (88 lb)  Wt Readings from Last 3 Encounters:   05/27/24 44.9 kg (98 lb 15.8 oz)   05/13/24 39.9 kg (88 lb)   05/08/24 40.1 kg (88 lb 6.5 oz)        Vitals :   Vitals:    05/28/24 0112 05/28/24 0412 05/28/24 0830 05/28/24 1124   BP: (!) 148/67 (!) 144/60 133/63 (!) 130/54   Pulse: 87 84 94 88   Resp: 18 16 16 18   Temp: 98.1 °F (36.7 °C) 98.2 °F (36.8 °C) 98.2 °F (36.8 °C) 97.9 °F (36.6 °C)   TempSrc: Oral Oral Axillary Oral   SpO2: 100% 99% 100% 98%   Weight:       Height:           Physical examination  General Appearance: Cachectic and ill,No distress  Mouth/Throat:  Oral mucosa moist  Neck:  Supple, no JVD  Lungs:  Breath sounds: clear  Heart::  S1,S2 heard  Abdomen:  Soft, non - tender  Musculoskeletal:  Edema -no significant edema noted    Medications:  Infusion:    sodium chloride      dextrose       Meds:    sevelamer  800 mg Oral TID WC    levETIRAcetam  500 mg Oral Daily    amLODIPine  5 mg Oral Daily    buPROPion  150 mg Oral QAM    buPROPion  300 mg Oral Nightly    docusate sodium  100 mg Oral TID    fluticasone  2 spray Each Nostril Daily    primidone  50 mg Oral Daily    sertraline  50 mg Oral Daily    atorvastatin  10 mg Oral Daily    sodium chloride flush  5-40 mL IntraVENous 2 times per day    polyethylene glycol  17 g Oral Daily    potassium chloride  40 mEq Oral Once       Lab Data :  CBC:   Recent Labs     
[facility]   Nephrology progress note  5/29/2024, 11:34 AM      Pt Name:    Deloris Watts  MRN:     [id number]     YOB: 1955  Admit Date:    5/25/2024 11:44 PM    Chief Complaint: Nephrology following for ESRD on hemodialysis.    Subjective:  Patient seen and examined  No chest pain or shortness of breath  Feels okay no other complaints    Objective:  24HR INTAKE/OUTPUT:    Intake/Output Summary (Last 24 hours) at 5/29/2024 1134  Last data filed at 5/29/2024 0817  Gross per 24 hour   Intake 300 ml   Output 0 ml   Net 300 ml        Admission weight: 39.9 kg (88 lb)  Wt Readings from Last 3 Encounters:   05/27/24 44.9 kg (98 lb 15.8 oz)   05/13/24 39.9 kg (88 lb)   05/08/24 40.1 kg (88 lb 6.5 oz)        Vitals :   Vitals:    05/28/24 2357 05/29/24 0328 05/29/24 0729 05/29/24 0743   BP: (!) 126/48 (!) 152/80  (!) 145/77   Pulse: 96 92 74 74   Resp: 18 16  18   Temp: 98.2 °F (36.8 °C) 97.7 °F (36.5 °C)  97.7 °F (36.5 °C)   TempSrc: Oral Oral  Oral   SpO2: 99% 98%  96%   Weight:       Height:           Physical examination  General Appearance: Cachectic and ill,No distress  Mouth/Throat:  Oral mucosa moist  Neck:  Supple, no JVD  Lungs:  Breath sounds: clear  Heart::  S1,S2 heard  Abdomen:  Soft, non - tender  Musculoskeletal:  Edema -no significant edema noted    Medications:  Infusion:    sodium chloride      dextrose       Meds:    sevelamer  800 mg Oral TID WC    levETIRAcetam  500 mg Oral Daily    amLODIPine  5 mg Oral Daily    buPROPion  150 mg Oral QAM    buPROPion  300 mg Oral Nightly    docusate sodium  100 mg Oral TID    fluticasone  2 spray Each Nostril Daily    primidone  50 mg Oral Daily    sertraline  50 mg Oral Daily    atorvastatin  10 mg Oral Daily    sodium chloride flush  5-40 mL IntraVENous 2 times per day    polyethylene glycol  17 g Oral Daily    potassium chloride  40 mEq Oral Once       Lab Data :  CBC:   Recent Labs     05/27/24  0636 05/28/24  0732 
[facility]  INPATIENT PHYSICAL THERAPY  EVALUATION  [facility] ICU 4D - 4D-17/017-A    Time In: 1100  Time Out: 1115  Timed Code Treatment Minutes: 8 Minutes  Minutes: 15          Date: 2024  Patient Name: Deloris Watts,  Gender:  female        MRN: [id number]  : 1955  (68 y.o.)      Referring Practitioner: Dr. ROVERTO Galvez  Diagnosis: subdural hemorrhage  Additional Pertinent Hx: 68 y.o. female seen in renal consult for ESRD. She dialyzes MWF under the care of Dr. Cuadra and last had HD on Friday. Has additional hx of CVA, HTN, HLD, CHF, and as below.  She presented to the hospital as a trauma alert following a fall. She hit her head and sounds like tripped over her walker. Head CT showed small acute left parafalcine hematoma     Restrictions/Precautions:  Restrictions/Precautions: Fall Risk    Subjective:  Chart Reviewed: Yes  Patient assessed for rehabilitation services?: Yes  Subjective: pt groggy initially but once initiated mobility had inc alertness, sister-in-law present and supportive and able to answer PLOF questions. pt oriented to self and family only.    General:        Hearing: Within functional limits       Pain: no c/o pain    Vitals: Vitals not assessed per clinical judgement, see nursing flowsheet    Social/Functional History:    Lives With: Spouse  Type of Home: House  Home Layout: One level  Home Access: Stairs to enter without rails  Entrance Stairs - Number of Steps: 1  Home Equipment: Lift chair, Walker - 4-Wheeled, Wheelchair - Manual (pt has BR on bed)                   Ambulation Assistance: Needs assistance  Transfer Assistance: Needs assistance    Active : No     Additional Comments: per family pt fluctuates 1-2 assist and has 24/7 care but is getting harder for spouse to care for pt, at times pt will amb with walker and 2 assist but primarily in w/c    OBJECTIVE:  Range of Motion:  Bilateral Lower Extremity: A/AAROM WFL    Strength:  Bilateral Lower Extremity: 
Patient arrived to unit from ED via bed. Patient transferred to ICU bed and placed on continuous ICU bedside monitor. Patient admitted for Subdural hemorrhage (HCC) [I62.00]  Trauma [T14.90XA]. Vitals obtained. See flowsheets. Patient's IV access includes 20g right forearm. Current infusions and rates of infusion include txa @ 400ml/hr. Assessment completed by Adrienne. Two nurse skin assessment completed by Adrienne and Trisha. See flowsheets for assessment details. Policies and procedures of ICU able to be explained to patient at this time. Family member(s)/representative(s) present at time of admission include . Patient rights explained to family member(s)/representatives and patient, as able. Patient/patient's family member(s)/representative(s) N/A to have physician notified of their admission. All questions posed by patient's family member(s)/representative(s) and patient answered at this time.     
Rounded on unit, spoke with patient and  explained that we are planning admission to [facility] today after dialysis.  Spoke with Primary RN Yaneli updated her on plans for discharge/readmit to [address] [place] [address] 8k12 call report to [phone].  ISSA Walker updated on plan to have patient admitted to [facility] today after dialsysis.    
SBIRT screening completed per trauma protocol. SBIRT Findings are Negative.  Patient denies alcohol and/or drug use. Also denies depressive symptoms.    Brief Intervention and Referral to Treatment Summary N/A    Patient was provided PHQ-9, AUDIT-C and DAST Screening:      PHQ-9 Score:  Negative  AUDIT-C Score:  Negative  DAST Score:   Negative    Patient’s substance use is considered-Negative    Low Risk/Healthy  Moderate Risk  Harmful  Dependent    Patient’s depression is considered:-Negative    Minimal  Mild   Moderate  Moderately Severe  Severe    Brief Education Was Provided N/A    Patient was receptive  Patient was not receptive      Brief Intervention Is Provided (Only for AUDIT-C or DAST) N/A    Patient reports readiness to decrease and/or stop use and a plan was discussed   Patient denies readiness to decrease and/or stop use and a plan was not discussed    Injured Trauma Survivor Screening  1.  When you were injured or right afterward   Did you think you were going to die? RESPONSES; YES+1/NO: NO  Do you think this was done to you intentionally? NO    Since your injury  Have you felt more restless, tense or jumpy than usual? RESPONSES; YES+1/NO: NO  Have you found yourself unable to stop worrying? RESPONSES; YES+1/NO: NO  Do you find yourself thinking that the world is unsafe and that people are not to be trusted? RESPONSES; YES+1/NO: NO    TOTAL SCORE from ITSS Questions 1 and 2:     NOTE: A score of greater than or equal to 2 is considered positive for PTSD risk and is to receive a community resource packet to link with appropriate providers.    Recommendations/Referrals for Brief and/or Specialized Treatment Provided to Patient  Clinician would like to note pt is oriented to person and situation only. Pt denies alcohol use, denies illicit drug use, report an extensive history of depression, state she is prescribed an antidepressant by her PCP and is compliant with taking it. Pt denies an issues with 
SW in to attempt TRAUMA SBIRT. Patient sleeping at this time; nurse states patient is alert x2. Nurse states to ask  before attempting SBIRT;  not in room at this time  
[facility]  PHYSICAL THERAPY MISSED TREATMENT NOTE  STRZ ICU 4D    Date: 2024  Patient Name: Deloris Watts        MRN: [id number]   : 1955  (68 y.o.)  Gender: female                REASON FOR MISSED TREATMENT:  Missed Treat.      Pt with orders for strict bedrest in ICU. Will await inc activity orders and check back as able.    
[facility]  OCCUPATIONAL THERAPY MISSED TREATMENT NOTE  STRZ ICU 4D  4D-17/017-A      Date: 2024  Patient Name: Deloris Watts        CSN: [id number]   : 1955  (68 y.o.)  Gender: female                REASON FOR MISSED TREATMENT:  orders for OT eval and treatment received. At 0800 patient remained on bedrest orders. Second attempt at 1346 patient with speech after receiving OOB activity orders.                 
[facility]  SPEECH THERAPY  STRZ RENAL TELEMETRY 6K  Speech - Language - Cognitive Evaluation + Therapy    SLP Individual Minutes  Time In: 08  Time Out: 914  Minutes: 32  Timed Code Treatment Minutes: 9 Minutes  Speech, language, cognitive evaluation: 14 minutes  Cognitive therapy: 9 minutes  Dysphagia therapy: 8 minutes        Date: 2024  Patient Name: Deloris Watts      CSN: [id number]   : 1955  (68 y.o.)  Gender: female   Referring Physician:  Caren Galvez MD   Diagnosis: Subdural hemorrhage  Precautions: Fall risk, aspiration precautions, modified low stimulation guidelines/protocol  History of Present Illness/Injury: Patient admitted to [facility] with above med dx; please refer to physician H&P for full details. Per chart review, \"68-year-old female with past medical history significant for anemia, HFpEF, ESRD on HD, recurrent UTIs, chronic constipation, hyperlipidemia, CVA with left-sided deficits, tremors, recurrent falls presented to [facility] on 2024 after a fall. Fall occurred at home where patient fell onto the ground and hit her head in the bathroom. She denies any loss of consciousness. Patient is on Plavix at home that was last taken a few hours prior to fall. Patient did fall on her left side and hit her back/hip. While in ED, CT head showed small acute left parafalcine subdural hematoma. Trauma surgery was consulted and admitted under their service to ICU after giving TXA.\"    [facility] 2024  IMPRESSION:  Impression:  1. Small acute left parafalcine subdural hematoma.  2. Chronic findings as above.    Past Medical History:   Diagnosis Date    Allergic rhinitis     Anemia in CKD (chronic kidney disease)     Anxiety     CHF (congestive heart failure) ([facility])     Closed fracture of right proximal humerus 2021    ORIF    Closed right ankle fracture     In ; treated with casting    CVA (cerebral infarction)     in  ; with left-sided weakness    
This nurse called the pharmacist regarding the infiltrated IV in the Right FA that infiltrated with keppra running.. Pharmacist stated that the infusion doesn't call for any certain steps to take.  Just take IV out and elevate.   
[facility]  INPATIENT REHABILITATION  ADMISSIONS COORDINATOR CONSULT    Referral Type: internal    Patient Name: Deloris Watts      MRN: [id number]    : 1955  (68 y.o.)  Gender: female   Race:White (non-)     Payor Source: Payor: MEDICARE / Plan: MEDICARE PART A AND B / Product Type: *No Product type* /   Secondary Payor Source:  BCBS    Isolation Status: No active isolations    Lives With: Spouse  Type of Home: House  Home Layout: One level  Home Access: Stairs to enter with rails  Entrance Stairs - Number of Steps: 1 MARIANA     Occupation: Retired  Type of Occupation: retails  Additional Comments: Pt. reports she would occassionaly need assistance with tasks (bathing), then reports she was doing some items on her own but by the end of session reporting spouse would need to help with dressing at times.  Unsure on accuracy of information, pt demo reduced cognition.  Pt. does report spouse is with her at all times and if he needs to leave he ensures someone is with her.    Disciplines Required upon Admission to Inpatient Rehabilitation: Physical Therapy, Occupational Therapy, and Speech Therapy  Post operative: No  Fall: Yes  Dialysis: Yes  Diet: ADULT DIET; Regular   Discussed patient with  and PM&R provider: Spoke with ISSA Sheth regarding patient referral.  Patient is known to PM&R team as she has had recent admissions to our care. Patient  Tenzin found writer on Rehab and verbalized his desire for the patient to receive therapy here at the hospital level of care.  Explained to him that the patient had to be thoroughly evaluated by therapy and medical provider in order to have a concrete understanding of the patient needs.      
Completed  AM-PAC Inpatient Mobility without Stair Climbing Raw Score : 13  AM-PAC Inpatient without Stair Climbing T-Scale Score : 38.96  Modified Carolyn Scale:  Not Applicable    ASSESSMENT:  Assessment: Patient progressing toward established goals.  Activity Tolerance:  Patient tolerance of  treatment: fair.      Equipment Recommendations:Other: cont to assess needs  Discharge Recommendations: Inpatient Rehabilitation and Patient would benefit from continued PT at discharge  Plan: Current Treatment Recommendations: Strengthening, Balance training, Endurance training, Functional mobility training, Transfer training, Gait training, Home exercise program, Equipment evaluation, education, & procurement, Therapeutic activities, Patient/Caregiver education & training, Safety education & training, Cognitive reorientation  General Plan:  (3-5X N)    Education  Education:  Learners: Patient  Patient Education: Plan of Care, Bed Mobility, Transfers, Gait    Goals:  Patient Goals : not stated  Short Term Goals  Time Frame for Short Term Goals: by discharge  Short Term Goal 1: bed mobility with Celine to get in/out of bed  Short Term Goal 2: transfer with Celine to get in/out of chairs  Short Term Goal 3: amb >10'x1 with AD and minAx1 to walk safely to bathroom  Long Term Goals  Time Frame for Long Term Goals : no LTGs set secondary to short ELOS    Following session, patient left in safe position with all fall risk precautions in place.   
assistance or supervision, and Patient would benefit from continued PT at discharge  Plan: Current Treatment Recommendations: Strengthening, Balance training, Endurance training, Functional mobility training, Transfer training, Gait training, Home exercise program, Equipment evaluation, education, & procurement, Therapeutic activities, Patient/Caregiver education & training, Safety education & training, Cognitive reorientation  General Plan:  (3-5X N)    Education  Education:  Learners: Patient  Patient Education: Plan of Care, Bed Mobility, Transfers, Gait    Goals:  Patient Goals : not stated  Short Term Goals  Time Frame for Short Term Goals: by discharge  Short Term Goal 1: bed mobility with Celine to get in/out of bed  Short Term Goal 2: transfer with Celine to get in/out of chairs  Short Term Goal 3: amb >10'x1 with AD and minAx1 to walk safely to bathroom  Long Term Goals  Time Frame for Long Term Goals : no LTGs set secondary to short ELOS    Following session, patient left in safe position with all fall risk precautions in place.   
life.    Discharge Recommendations:  Continue to assess pending progress, Subacute/Skilled Nursing Facility, Patient would benefit from continued therapy after discharge    Patient Education:          Patient Education  Education Given To: Patient  Education Provided: Role of Therapy;Plan of Care;ADL Adaptive Strategies;Precautions;Transfer Training  Education Method: Demonstration;Verbal  Barriers to Learning: Cognition  Education Outcome: Verbalized understanding;Continued education needed    Equipment Recommendations:  Other: defer to next level of care    Plan:  Times Per Week: 5x  Current Treatment Recommendations: Strengthening, Patient/Caregiver education & training, Balance training, Functional mobility training, Self-Care / ADL, Endurance training, Equipment evaluation, education, & procurement, Safety education & training, Gait training, Neuromuscular re-education.  See long-term goal time frame for expected duration of plan of care.  If no long-term goals established, a short length of stay is anticipated.    Goals:  Patient goals : pt did not state  Short Term Goals  Time Frame for Short Term Goals: By time of d/c  Short Term Goal 1: Pt. to demo improved standing tolerance up to 5 min SBA with 1-2 hand release to maximize performance with ADLs.  Short Term Goal 2: Pt. to be able to sequence/problem solve 2-3 step ADL routine with overall SBA to maximize performance with ADLs.  Short Term Goal 3: Pt. to demo SBA to transfer to/from toilet/BSC with min vc to progress to PLOF.  Short Term Goal 4: Pt. to navigate bathroom distances with SBA with RW with 1-2 cues to enhance performance with bathroom related tasks.  Short Term Goal 5: Pt. to sit EOB for 10 minutes with SBA and no cues to reposition to midline to enhance performance with seated ADLs.  Long Term Goals  Time Frame for Long Term Goals : no LTG due to ELOS    AM-[facility] Inpatient Daily Activity Raw Score: 17  AM-[facility] Inpatient ADL T-Scale Score : 
anterior fusion of C5 through C7. There is unchanged mild degenerative anterolisthesis of C7 on T1. There is no fracture. There is moderate C4-5 degenerative disc disease and mild C3-4 and C7-T1 degenerative disc disease. There is multilevel facet and uncovertebral joint osteoarthritis with associated neuroforaminal stenoses.     Impression: No evidence of cervical spine injury. This document has been electronically signed by: Dwight Kumar MD on 05/26/2024 01:14 AM All CTs at this facility use dose modulation techniques and iterative reconstructions, and/or weight-based dosing when appropriate to reduce radiation to a low as reasonably achievable.    CT Head W/O Contrast    Result Date: 5/26/2024  ** ADDENDUM #1 ** This report was discussed with Dr. Michael Aggarwal on May 26, 2024 01:14:00 EDT. This document has been electronically signed by: Monica Ramachandran on 05/26/2024 01:14 AM ** ORIGINAL REPORT ** CT head without contrast Comparison: CT/SR - CT HEAD WO CONTRAST - 05/06/2024 04:29 PM EDT Findings: There is a small acute subdural hematoma along the left side of the falx. There is an unchanged chronic right MCA infarct in the right frontal lobe. There is generalized cerebral atrophy. Hypoattenuation in the deep cerebral white matter is consistent with chronic small vessel ischemic disease. Ventricles are of normal size and shape. No mass effect or midline shift is present. The gray-white matter differentiation appears normal. There is a small mucous retention cyst in the left maxillary sinus. No fractures are identified.     Impression: 1. Small acute left parafalcine subdural hematoma. 2. Chronic findings as above. This document has been electronically signed by: Dwight Kumar MD on 05/26/2024 01:08 AM All CTs at this facility use dose modulation techniques and iterative reconstructions, and/or weight-based dosing when appropriate to reduce radiation to a low as reasonably achievable.    XR CHEST 
so in the context of team rounds.  A full chart review was performed by me.       I attest that this medical record entry accurately reflects signatures and notations that I made in my capacity as an M.D. when I treated and diagnosed Deloris Watts on the date of service above     I was responsible for all medical decision making involving this encounter.      I identified and/or confirmed all problems associated with this patient encounter by my own direct physical examination of this patient and review of all radiology studies and labwork  that were ordered and available.    Active Hospital Problems    Diagnosis     Antiplatelet or antithrombotic long-term use [Z79.02]     SDH (subdural hematoma) (HCC) [S06.5XAA]     Subdural hemorrhage (HCC) [I62.00]     Fall as cause of accidental injury at home as place of occurrence [W19.XXXA, Y92.009]     Trauma [T14.90XA]     ESRD (end stage renal disease) (HCC) [N18.6]         I  discussed the management of all of the identified problems with the APN or PA.      I formulated the treatment plan for all identified problems and discussed those with the APN or PA .      This management plan was then carried out and the patient's orders for care by the APN or PA.          Please see our orders that were directed and approved by me if there are any new ones for the updated patient care plan.    Above discussed and I agree with documentation and orders placed by Kiki SALCEDO    See any additional comments if needed below for any other updated orders and plans.

## 2024-05-30 PROBLEM — E43 SEVERE MALNUTRITION (HCC): Status: ACTIVE | Noted: 2024-05-30

## 2024-05-30 LAB
ALBUMIN SERPL BCG-MCNC: 3.3 G/DL (ref 3.5–5.1)
ALP SERPL-CCNC: 126 U/L (ref 38–126)
ALT SERPL W/O P-5'-P-CCNC: 10 U/L (ref 11–66)
ANION GAP SERPL CALC-SCNC: 13 MEQ/L (ref 8–16)
AST SERPL-CCNC: 16 U/L (ref 5–40)
BASOPHILS ABSOLUTE: 0.1 THOU/MM3 (ref 0–0.1)
BASOPHILS NFR BLD AUTO: 1.1 %
BILIRUB CONJ SERPL-MCNC: < 0.2 MG/DL (ref 0–0.3)
BILIRUB SERPL-MCNC: 0.4 MG/DL (ref 0.3–1.2)
BUN SERPL-MCNC: 14 MG/DL (ref 7–22)
CALCIUM SERPL-MCNC: 8.5 MG/DL (ref 8.5–10.5)
CHLORIDE SERPL-SCNC: 97 MEQ/L (ref 98–111)
CHOLEST SERPL-MCNC: 152 MG/DL (ref 100–199)
CO2 SERPL-SCNC: 27 MEQ/L (ref 23–33)
CREAT SERPL-MCNC: 3.1 MG/DL (ref 0.4–1.2)
DEPRECATED RDW RBC AUTO: 62.8 FL (ref 35–45)
EOSINOPHIL NFR BLD AUTO: 1.8 %
EOSINOPHILS ABSOLUTE: 0.1 THOU/MM3 (ref 0–0.4)
ERYTHROCYTE [DISTWIDTH] IN BLOOD BY AUTOMATED COUNT: 15.6 % (ref 11.5–14.5)
GFR SERPL CREATININE-BSD FRML MDRD: 16 ML/MIN/1.73M2
GLUCOSE SERPL-MCNC: 72 MG/DL (ref 70–108)
HCT VFR BLD AUTO: 40.6 % (ref 37–47)
HDLC SERPL-MCNC: 96 MG/DL
HGB BLD-MCNC: 12 GM/DL (ref 12–16)
IMM GRANULOCYTES # BLD AUTO: 0.05 THOU/MM3 (ref 0–0.07)
IMM GRANULOCYTES NFR BLD AUTO: 0.9 %
LDLC SERPL CALC-MCNC: 45 MG/DL
LYMPHOCYTES ABSOLUTE: 2.2 THOU/MM3 (ref 1–4.8)
LYMPHOCYTES NFR BLD AUTO: 38.9 %
MAGNESIUM SERPL-MCNC: 2.2 MG/DL (ref 1.6–2.4)
MCH RBC QN AUTO: 31.7 PG (ref 26–33)
MCHC RBC AUTO-ENTMCNC: 29.6 GM/DL (ref 32.2–35.5)
MCV RBC AUTO: 107.4 FL (ref 81–99)
MONOCYTES ABSOLUTE: 0.7 THOU/MM3 (ref 0.4–1.3)
MONOCYTES NFR BLD AUTO: 12.1 %
NEUTROPHILS ABSOLUTE: 2.6 THOU/MM3 (ref 1.8–7.7)
NEUTROPHILS NFR BLD AUTO: 45.2 %
NRBC BLD AUTO-RTO: 0 /100 WBC
PHOSPHATE SERPL-MCNC: 3.2 MG/DL (ref 2.4–4.7)
PLATELET # BLD AUTO: 262 THOU/MM3 (ref 130–400)
PMV BLD AUTO: 11.2 FL (ref 9.4–12.4)
POTASSIUM SERPL-SCNC: 3.4 MEQ/L (ref 3.5–5.2)
PREALB SERPL-MCNC: 14 MG/DL (ref 20–40)
PROT SERPL-MCNC: 5.3 G/DL (ref 6.1–8)
RBC # BLD AUTO: 3.78 MILL/MM3 (ref 4.2–5.4)
SODIUM SERPL-SCNC: 137 MEQ/L (ref 135–145)
TRIGL SERPL-MCNC: 56 MG/DL (ref 0–199)
WBC # BLD AUTO: 5.7 THOU/MM3 (ref 4.8–10.8)

## 2024-05-30 PROCEDURE — 80053 COMPREHEN METABOLIC PANEL: CPT

## 2024-05-30 PROCEDURE — 6370000000 HC RX 637 (ALT 250 FOR IP): Performed by: INTERNAL MEDICINE

## 2024-05-30 PROCEDURE — 97110 THERAPEUTIC EXERCISES: CPT

## 2024-05-30 PROCEDURE — 85025 COMPLETE CBC W/AUTO DIFF WBC: CPT

## 2024-05-30 PROCEDURE — 97530 THERAPEUTIC ACTIVITIES: CPT

## 2024-05-30 PROCEDURE — 97166 OT EVAL MOD COMPLEX 45 MIN: CPT

## 2024-05-30 PROCEDURE — 6370000000 HC RX 637 (ALT 250 FOR IP): Performed by: PHYSICAL MEDICINE & REHABILITATION

## 2024-05-30 PROCEDURE — 99232 SBSQ HOSP IP/OBS MODERATE 35: CPT | Performed by: PHYSICAL MEDICINE & REHABILITATION

## 2024-05-30 PROCEDURE — 6360000002 HC RX W HCPCS: Performed by: PHYSICAL MEDICINE & REHABILITATION

## 2024-05-30 PROCEDURE — 97116 GAIT TRAINING THERAPY: CPT

## 2024-05-30 PROCEDURE — 97162 PT EVAL MOD COMPLEX 30 MIN: CPT

## 2024-05-30 PROCEDURE — 36415 COLL VENOUS BLD VENIPUNCTURE: CPT

## 2024-05-30 PROCEDURE — 1180000000 HC REHAB R&B

## 2024-05-30 PROCEDURE — 83735 ASSAY OF MAGNESIUM: CPT

## 2024-05-30 PROCEDURE — 80061 LIPID PANEL: CPT

## 2024-05-30 PROCEDURE — 82248 BILIRUBIN DIRECT: CPT

## 2024-05-30 PROCEDURE — 92523 SPEECH SOUND LANG COMPREHEN: CPT | Performed by: SPEECH-LANGUAGE PATHOLOGIST

## 2024-05-30 PROCEDURE — 84100 ASSAY OF PHOSPHORUS: CPT

## 2024-05-30 PROCEDURE — 99221 1ST HOSP IP/OBS SF/LOW 40: CPT | Performed by: INTERNAL MEDICINE

## 2024-05-30 PROCEDURE — 97535 SELF CARE MNGMENT TRAINING: CPT

## 2024-05-30 PROCEDURE — 84134 ASSAY OF PREALBUMIN: CPT

## 2024-05-30 RX ORDER — MEGESTROL ACETATE 40 MG/1
40 TABLET ORAL DAILY
Status: DISCONTINUED | OUTPATIENT
Start: 2024-05-30 | End: 2024-06-15 | Stop reason: HOSPADM

## 2024-05-30 RX ORDER — MONTELUKAST SODIUM 10 MG/1
10 TABLET ORAL DAILY
Status: DISCONTINUED | OUTPATIENT
Start: 2024-05-30 | End: 2024-06-05 | Stop reason: ALTCHOICE

## 2024-05-30 RX ORDER — TRAZODONE HYDROCHLORIDE 50 MG/1
50 TABLET ORAL NIGHTLY
Status: DISCONTINUED | OUTPATIENT
Start: 2024-05-30 | End: 2024-06-04

## 2024-05-30 RX ORDER — POTASSIUM CHLORIDE 20 MEQ/1
20 TABLET, EXTENDED RELEASE ORAL ONCE
Status: COMPLETED | OUTPATIENT
Start: 2024-05-30 | End: 2024-05-30

## 2024-05-30 RX ADMIN — LEVETIRACETAM 500 MG: 500 TABLET, FILM COATED ORAL at 09:45

## 2024-05-30 RX ADMIN — SEVELAMER CARBONATE 800 MG: 800 TABLET, FILM COATED ORAL at 09:44

## 2024-05-30 RX ADMIN — BUPROPION HYDROCHLORIDE 300 MG: 300 TABLET, EXTENDED RELEASE ORAL at 21:03

## 2024-05-30 RX ADMIN — AMLODIPINE BESYLATE 5 MG: 5 TABLET ORAL at 09:45

## 2024-05-30 RX ADMIN — POTASSIUM CHLORIDE 20 MEQ: 1500 TABLET, EXTENDED RELEASE ORAL at 12:33

## 2024-05-30 RX ADMIN — ACETAMINOPHEN 650 MG: 325 TABLET ORAL at 16:57

## 2024-05-30 RX ADMIN — SEVELAMER CARBONATE 800 MG: 800 TABLET, FILM COATED ORAL at 12:33

## 2024-05-30 RX ADMIN — MONTELUKAST SODIUM 10 MG: 10 TABLET ORAL at 09:45

## 2024-05-30 RX ADMIN — DOCUSATE SODIUM 100 MG: 100 CAPSULE, LIQUID FILLED ORAL at 09:44

## 2024-05-30 RX ADMIN — TRAZODONE HYDROCHLORIDE 50 MG: 50 TABLET ORAL at 21:03

## 2024-05-30 RX ADMIN — FLUTICASONE PROPIONATE 2 SPRAY: 50 SPRAY, METERED NASAL at 09:44

## 2024-05-30 RX ADMIN — HEPARIN SODIUM 5000 UNITS: 5000 INJECTION INTRAVENOUS; SUBCUTANEOUS at 09:45

## 2024-05-30 RX ADMIN — SENNOSIDES 8.6 MG: 8.6 TABLET ORAL at 21:03

## 2024-05-30 RX ADMIN — DOCUSATE SODIUM 100 MG: 100 CAPSULE, LIQUID FILLED ORAL at 16:57

## 2024-05-30 RX ADMIN — HEPARIN SODIUM 5000 UNITS: 5000 INJECTION INTRAVENOUS; SUBCUTANEOUS at 21:02

## 2024-05-30 RX ADMIN — DOCUSATE SODIUM 100 MG: 100 CAPSULE, LIQUID FILLED ORAL at 21:03

## 2024-05-30 RX ADMIN — ATORVASTATIN CALCIUM 10 MG: 10 TABLET, FILM COATED ORAL at 09:45

## 2024-05-30 RX ADMIN — ACETAMINOPHEN 650 MG: 325 TABLET ORAL at 12:33

## 2024-05-30 RX ADMIN — BUPROPION HYDROCHLORIDE 150 MG: 150 TABLET, EXTENDED RELEASE ORAL at 09:45

## 2024-05-30 RX ADMIN — PRIMIDONE 50 MG: 50 TABLET ORAL at 09:45

## 2024-05-30 RX ADMIN — ACETAMINOPHEN 650 MG: 325 TABLET ORAL at 06:35

## 2024-05-30 RX ADMIN — SERTRALINE HYDROCHLORIDE 50 MG: 50 TABLET ORAL at 09:45

## 2024-05-30 RX ADMIN — SEVELAMER CARBONATE 800 MG: 800 TABLET, FILM COATED ORAL at 16:57

## 2024-05-30 RX ADMIN — ACETAMINOPHEN 650 MG: 325 TABLET ORAL at 00:33

## 2024-05-30 RX ADMIN — MEGESTROL ACETATE 40 MG: 40 TABLET ORAL at 09:44

## 2024-05-30 RX ADMIN — POLYETHYLENE GLYCOL 3350 17 G: 17 POWDER, FOR SOLUTION ORAL at 09:44

## 2024-05-30 ASSESSMENT — PAIN SCALES - GENERAL: PAINLEVEL_OUTOF10: 0

## 2024-05-30 NOTE — PROGRESS NOTES
This Pre Admission Screen is a refiled document from the acute stay. The Pre Admission was completed and signed on 2024 at 9:04 by Dr. Jax Whatley.      [facility]  Acute Inpatient Rehab Preadmission Assessment     Patient Name: Deloris Watts        Ethnicity:Not of , /a, or Micronesian origin  Race:White  MRN:   [id number]    : 1955  (68 y.o.)  Gender: female      Admitted from:[facility]  Initial Assessment     Date of admission to the hospital: 2024 11:44 PM  Date patient eligible for admission:2024     Primary Diagnosis: Subdural hematoma      Did patient have surgery?  No     Physicians: Virgilio Duran MD, Dr. Whatley, Dr. Mosquera, Dr. Ballard, Dr. Garza      Risk for clinical complications/co-morbidities:   Past Medical History        Past Medical History:   Diagnosis Date    Allergic rhinitis      Anemia in CKD (chronic kidney disease)      Anxiety      CHF (congestive heart failure) (HCC)      Closed fracture of right proximal humerus 2021     ORIF    Closed right ankle fracture       In ; treated with casting    CVA (cerebral infarction)       in  ; with left-sided weakness    Depression      Fibromyalgia       in     Headache(784.0)      Hemiplegia and hemiparesis following cerebral infarction affecting left non-dominant side (HCC)       in     Hydronephrosis 2023     Urogenital Implants, calculus of ureter, UTI    Hyperlipidemia      Hypertension       in     Irritable bowel syndrome       in     Kidney failure       Chronic Kidney Disease, Renal Dialysis started in 2023    Osteoporosis 2019    Unspecified cerebral artery occlusion with cerebral infarction      Unspecified sleep apnea      Wrist fracture, right 2018     Treated with casting            Financial Information  Primary insurance: Medicare     Secondary Insurance:   BCBS Medicare Supplement      Has the patient had two or more

## 2024-05-30 NOTE — CONSULTS
[facility]          [address]        [address]        [place], [place] [id number] [phone]           Inpatient Initial consult note         5/30/2024 10:53 AM      Patient Name:   Deloris Watts  YOB: 1955  Primary Care Physician:  Johana Weldon MD   Admit Date:    5/29/2024  4:40 PM    Consultation requested by : Jax Whatley MD    Reason for Consult : Nephrology following for ESRD on hemodialysis.    History of presenting illness :Deloris Watts is a 68 y.o.   female with Past Medical History as stated below presented with a chief complaint of No chief complaint on file.   on 5/29/2024 .    Patient has sustained a fall and had a subdural hematoma she was initially admitted to [facility] and subsequently transferred to rehab.  Currently she is looking well denies any complaints no chest pain or shortness of breath not in any acute distress    Past History:  Past Medical History:   Diagnosis Date    Allergic rhinitis     Anemia in CKD (chronic kidney disease)     Anxiety     CHF (congestive heart failure) ([facility])     Closed fracture of right proximal humerus 09/18/2021    ORIF    Closed right ankle fracture     In 1990s; treated with casting    CVA (cerebral infarction)     in 1990s ; with left-sided weakness    Depression     Fibromyalgia     in 1990s    Headache(784.0)     Hemiplegia and hemiparesis following cerebral infarction affecting left non-dominant side (HCC)     in 1990s    Hydronephrosis 09/01/2023    Urogenital Implants, calculus of ureter, UTI    Hyperlipidemia     Hypertension     in 1980s    Irritable bowel syndrome     in 1990s    Kidney failure     Chronic Kidney Disease, Renal Dialysis started in 1/2023    Osteoporosis 2019    Unspecified cerebral artery occlusion with cerebral infarction     Unspecified sleep apnea     Wrist fracture, right 2018    Treated with casting     Past Surgical History:   Procedure Laterality Date     oriented to person, place, and time.   Psychiatric:         Mood and Affect: Mood normal.         Behavior: Behavior normal.          BP (!) 142/74   Pulse 95   Temp 97.5 °F (36.4 °C) (Oral)   Resp 18   Ht 1.524 m (5')   Wt 42.2 kg (93 lb 0.6 oz)   SpO2 100%   BMI 18.17 kg/m²   Labs, Radiology and Tests :  CBC:   Recent Labs     05/28/24  0732 05/29/24  0530 05/30/24  0547   WBC 6.2 8.8 5.7   HGB 11.9* 12.6 12.0   HCT 39.2 41.0 40.6    267 262     CMP:  Recent Labs     05/28/24  0732 05/29/24  0530 05/30/24  0547    136 137   K 4.1 3.6 3.4*   CL 99 99 97*   CO2 24 23 27   BUN 25* 31* 14   CREATININE 4.0* 5.1* 3.1*   GLUCOSE 60* 67* 72   CALCIUM 8.6 8.9 8.5   MG 2.1 2.4 2.2   PHOS 4.3 4.0 3.2     Hepatic:   Recent Labs     05/30/24  0547   AST 16   ALT 10*   BILITOT 0.4   ALKPHOS 126     Assessment and Plan:  ESRD on HD MWF  Volume status and electrolytes reasonable no acute need for HD today  Subdural hematoma 2/2 fall  Essential hypertension  Hypokalemia we will give her 20 mEq KCl today  Recurrent falls  Hyperphosphatemia, doing well  Elevated trop  Meds reviewed discussed with the patient       To Cuadra MD  [facility]    This report has been created using voice recognition software. It may contain minor errors which are inherent in voice recognition technology

## 2024-05-30 NOTE — PROGRESS NOTES
Physical Therapy   [facility]  INPATIENT PHYSICAL THERAPY  DAILY NOTE  [facility] - [id number]  Time In: 1400  Time Out: 1430  Timed Code Treatment Minutes: 30 Minutes  Minutes: 30     Date: 2024  Patient Name: Deloris Watts,  Gender:  female        MRN: [id number]  : 1955  (68 y.o.)  Referral Date : 24  Referring Practitioner: Jax Whatley MD  Diagnosis: Subdural hematoma, acute (HCC)  Additional Pertinent Hx: Per H&P 24: Deloris Watts is a 68 y.o. right-handed  female with history of hypertension, osteoporosis, end-stage renal disease on hemodialysis (MWF) since 2023, stroke with left side weakness in , anemia, depression, anxiety, left hip hemiarthroplasty on 3/11/2024, and disability secondary to fall resulting acute left parafalcine subdural hematoma. The patient's  says he assisted the patient to sit on her rollator walker seat in living room and then pushing the rollator walker to bring the patient back to bedroom on 2024.  When they were near the bedroom door on the hallway, the patient suddenly slipped off the rollator walker seat and fell down to the floor with her head hitting the carpeted floor of the bedroom entrance.  She did not loss consciousness but felt some headache afterwards. CT of the head done on 2024 revealed small acute left parafalcine subdural hematoma.  Cervical spine CT on 2024 revealed no cervical spine injury.  Neurosurgery was consulted on 2024 for the acute subdural hematoma. A dose of tranexamic acid (TXA) was given.  No acute neurosurgical intervention was advised.  Neurosurgery note stated that Lovenox can be started on 2024 for DVT prophylaxis.  CT of head was repeated later on 2024 and showed stable CT scan of brain without interval change compared to previous study.     Prior Level of Function:  Lives With: Spouse  Type of Home: House  Home  supervision, and Patient would benefit from continued PT at discharge  Plan: Current Treatment Recommendations: Strengthening, Balance training, Endurance training, Functional mobility training, Transfer training, Gait training, Home exercise program, Equipment evaluation, education, & procurement, Therapeutic activities, Patient/Caregiver education & training, Safety education & training, Cognitive reorientation, Neuromuscular re-education, Wheelchair mobility training, ROM  General Plan:  (5x/wk for 90 minutes)    Education  Education:  Learners: Patient and Family  Patient Education: Plan of Care, Home Exercise Program, Precautions/Restrictions, Family Education, Bed Mobility, Equipment Education, Transfers, Reviewed Prior Education, Gait, Use of Gait Belt, Verbal Exercise Instruction, Activity Pacing, Energy Conservation    Goals:  Patient Goals : per family, improve endurance, transfers,walking and performing ADLs  Short Term Goals  Time Frame for Short Term Goals: 1 wk from evaluation  Short Term Goal 1: Pt will perform sup<>sit in bed with HOB flat and 1 rail with mod A in order to exit bed  Short Term Goal 2: Pt will sit to stand without posterior trunk lean and safe hand placement with CGA and RW in order to safely transfer from various surfaces  Short Term Goal 3: Pt will ambulate 50' with RW and CGA for improved functional mobility  Short Term Goal 4: Pt to perform ramp ambulation with RW and mod A  for progressing to home entry  Long Term Goals  Time Frame for Long Term Goals : 3 weeks from evaluation  Long Term Goal 1: Pt will perform sup<> sit in bed with HOB flat and no rail with min A for ease of getting out bed  Long Term Goal 2: Pt will sit<>stand without posterior trunk lean and safe hand placement with SBA and RW in order to safely transfer for safety in the home  Long Term Goal 3: Pt will ambulate 100' RW achieving heel strike more than 75% of the time with CGA for safety in community  Long Term

## 2024-05-30 NOTE — PROGRESS NOTES
Comprehensive Nutrition Assessment    Type and Reason for Visit:  Initial, Positive Nutrition Screen, Consult (Nutritional assessment, weight loss, poor po)    Nutrition Recommendations/Plan:   Recommend continue regular diet (d/t poor po intake).  Send Magic Cups TID, Nepro once/day - pt. Reports acceptance on previous admissions.  Recommend renal MVI.  Suggest continue Megace for appetite stimulation.  Encouraged po, protein intake at best efforts.  Discussed appropriate use of ONS at discharge.     Malnutrition Assessment:  Malnutrition Status:  Severe malnutrition (05/30/24 1355)    Context:  Chronic Illness     Findings of the 6 clinical characteristics of malnutrition:  Energy Intake:  75% or less estimated energy requirements for 1 month or longer  Weight Loss:   (-10.6% in 1.5 months (no edema either weight))     Body Fat Loss:  Severe body fat loss Orbital, Triceps, Buccal region   Muscle Mass Loss:   (moderate) Temples (temporalis)  Fluid Accumulation:  Unable to assess (HD)     Strength:  Not Performed    Nutrition Assessment:     Pt. severely malnourished AEB criteria listed above.  At risk for further nutritional compromise r/t admit w/ subdural hematoma s/p fall, increased nutrient needs d/t known losses w/ dialysis and underlying medical condition (hx CKD, CHF, CVA, depression, HTN, HLD, ESRD-HD).       Nutrition Related Findings:      Wound Type: None     Pt. Report/Treatments/Miscellaneous:   5/30: pt. Seen - reports appetite is \"ok\"; denies any trouble tolerating diet; has only taken a few bites of lunch tray; reports consuming 3 small meals/day and drinks 1 Nepro; states acceptance of Magic Cups and Nepro on previous admissions and agreeable to receive again; states some issues w/ constipation in the past but has resolved; attributes weight loss to various injuries, stress over past few months; SLP following  GI Status: BM 5/29  Pertinent Labs: 5/30: Glucose 72, BUN 14, Cr 3.1, Potassium  LD  Contact: [phone]

## 2024-05-30 NOTE — PROGRESS NOTES
[facility]  Recreational Therapy  Inpatient Rehabilitation Evaluation        Time Spent with Patient: 25 minutes    Date:  5/30/2024       Patient Name: Deloris Watts      MRN: [id number]       YOB: 1955 (68 y.o.)       Gender: female  Diagnosis: Subdural hematoma  Referring Practitioner: Dr. ZULEMA Whatley    RESTRICTIONS/PRECAUTIONS:  Restrictions/Precautions: Fall Risk, General Precautions, Seizure, Bed Alarm, Surgical protocol     Hearing: Within functional limits    PAIN: 3    SUBJECTIVE:  pt lives with spouse-someone is always with her when she is at home    VISION:  Glasses    HEARING: Hard of Hearing    LEISURE INTERESTS:   Pt came to the beauty shop where she enjoyed getting her hair washed, trimmed and styled by our beautician-affect brightened with conversation-at times rested and shut her eyes-states she and  play cards, she likes to watch tv and read-pt gets on her phone a lot in free time-appreciated getting her hair washed-pleasant    BARRIERS TO LEISURE INTERESTS:    Decreased endurance and Pain           Patient Education  New Education Provided: Importance of Leisure, RT Plan of Care    Plan:  Continue to follow patient through this admission  Include patient in appropriate groups  See patient individually    Electronically signed by: Elena Tan, CTRS  Date: 5/30/2024

## 2024-05-30 NOTE — PLAN OF CARE
Individualized Plan of Care  [facility] Inpatient Rehabilitation Unit    Rehabilitation physician: Dr. Whatley    Admit Date: 5/29/2024     Rehabilitation Diagnosis: Subdural hematoma (HCC) [S06.5XAA]      Rehabilitation impairments: self care, mobility, motor dysfunction, bowel/bladder management, pain management, safety, and cognitive function    Factors facilitating achievement of predicted outcomes: Family support, Motivated, Cooperative, Pleasant, Home is wheelchair accessible, and Knowledge about rehab  Barriers to the achievement of predicted outcomes: Pain, Limited family support, Cognitive deficit, Limited safety awareness, Limited insight into deficits, Unrealistic expectations, Decreased endurance, Upper extremity weakness, Lower extremity weakness, Long standing deficits, Medical complications, and Skin Care    Patient Goals: Improve independence with mobility, Improvement of mobility at a wheelchair level, Increase overall strength and endurance, Increase balance, Increase endurance, Increase independence with activities of daily living, Improve cognition, Increase self-awareness, Increase safety awareness, Increase community integration, Increase socialization, Functional communication with caregivers, Integrate appropriate pain management plan, Assure adequate nutritional option for discharge, Continence of bowel and bladder, and Provide appropriate patient and family education      NURSING:  Nursing goals for Deloris Watts while on the rehabilitation unit will include:  Continence of bowel and bladder, Adequate number of bowel movements, Urinate with no urinary retention >300ml in bladder, Maintain O2 SATs at an acceptable level during stay, Effective pain management while on the rehabilitation unit, Establish adequate pain control plan for discharge, Absence of skin breakdown while on the rehabilitation unit, Improved skin integrity via assessments including wound measurements,

## 2024-05-30 NOTE — PROGRESS NOTES
[facility]  INPATIENT OCCUPATIONAL THERAPY  [facility]  EVALUATION    Time:    Time In: 0730  Time Out: 0900  Timed Code Treatment Minutes: 74 Minutes  Minutes: 90          Date: 2024  Patient Name: Deloris Watts,   Gender: female      MRN: [id number]  : 1955  (68 y.o.)  Referring Practitioner: Dr. ZULEMA Whatley  Diagnosis: Subdural hematoma  Additional Pertinent Hx: Deloris Watts is a 68 y.o.  right-handed slim  female with history of hypertension, fibromyalgia, irritable bowel syndrome, essential tremor, end-stage renal disease on hemodialysis (MWF) since 2023, stroke with left side weakness in , depression, anxiety, right wrist and right ankle fracture treated conservatively, right proximal humeral fracture due to fall requiring ORIF, L2 compression fracture requiring kyphoplasty, LASEK surgery, left femur neck displaced fracture due to fall on 3/10/2024 requiring left hip hemiarthroplasty on 3/11/2024, was admitted on 2024 for intensive inpatient management of impairment & disability secondary to fall resulting acute left parafalcine subdural hematoma. Pt sustained a fall at home and brought to the hospital. CT of the head done on 2024 revealed small acute left parafalcine subdural hematoma.  Neurosurgery was consulted on 2024 for the acute subdural hematoma. A dose of tranexamic acid (TXA) was given.  No acute neurosurgical intervention was advised. Pt admitted to [facility] to regain strength, endurance and balance to improve indep with self care before returning home with spouse.    Restrictions/Precautions:  Restrictions/Precautions: Fall Risk, General Precautions  Position Activity Restriction  Hip Precautions: No hip flexion > 90 degrees, No hip internal rotation, No ADduction, No hip external rotation  Other position/activity restrictions: History of L hemiarthroplasty 3/11/24.  Perfect serve to Dr. Lezama sent  to

## 2024-05-30 NOTE — PROGRESS NOTES
[facility]  Individualized Disclosure Statement      Patient: Deloris Watts      Scope of Service  [facility] provides 24 hour individualized service to patients with functional limitations due to, but not limited to: stroke, brain injury, spinal cord injury, major multiple trauma, fractures, amputation, and neurological disorders. [facility] provides rehabilitative nursing and medical services as well as physical, occupational, speech, and recreation therapies.  [facility] is fully accredited by the Commission on Accreditation of Rehabilitation Facilities (CARF) as a comprehensive provider of rehabilitation services.  Patients admitted to the [facility] receive a minimum of three hours of therapy per day, at least five days per week, with a revised therapy schedule on weekends and holidays.  Physical therapy, occupational therapy, and speech therapy are provided seven days per week including holidays.  Other therapeutic services are available on weekends and evenings as needed or scheduled.  Intensity of Treatment  Your treatment program will consist of Nursing Care and:  1.5 hours of Physical Therapy, per day  1.5 hours of Occupational Therapy, per day   30-60 minutes of Speech Therapy, per day  1 hour of Recreational Therapy, per week  Depending on your needs, the exact time spent with each of the above disciplines may fluctuate, however you will receive at least 3 hours of therapy at least 5 days per week.    [facility] maintains contracts with most insurance plans.  Depending on the type of coverage, the insurance may impose limits on the coverage for rehabilitation care.  Coverage is based on the premise that you are able to fully participate in the rehabilitation program and show continued progress. Please verify your own insurance information. A  copy of this was given to the patient/ family on this date.  Insurance Coverage  Your insurance company has made the following determination relative to the length of your stay:   Your estimated length of stay is 14 days   Your insurance Coverage has been verified as follows:    Primary Insurance: Payor: MEDICARE /  /  /    Deductible: $1632.00     Coverage: Active  Secondary Insurance:BCBS  Secondary insurance policies often cover co-pay amounts, but to ensure payment please contact your insurance company.    Alternative Resources: Please ask the  for more information [phone]

## 2024-05-30 NOTE — PLAN OF CARE
Problem: Discharge Planning  Goal: Discharge to home or other facility with appropriate resources  Outcome: Progressing  Flowsheets  Taken 5/29/2024 2038 by Beverley Hendrickson RN  Discharge to home or other facility with appropriate resources: Identify barriers to discharge with patient and caregiver  Taken 5/29/2024 1654 by Lo Machado, RN  Discharge to home or other facility with appropriate resources: Identify barriers to discharge with patient and caregiver     Problem: Safety - Adult  Goal: Free from fall injury  Outcome: Progressing     Problem: ABCDS Injury Assessment  Goal: Absence of physical injury  Outcome: Progressing  Flowsheets (Taken 5/29/2024 1652 by Lo Machado, RN)  Absence of Physical Injury: Implement safety measures based on patient assessment     Problem: Skin/Tissue Integrity  Goal: Absence of new skin breakdown  Description: 1.  Monitor for areas of redness and/or skin breakdown  2.  Assess vascular access sites hourly  3.  Every 4-6 hours minimum:  Change oxygen saturation probe site  4.  Every 4-6 hours:  If on nasal continuous positive airway pressure, respiratory therapy assess nares and determine need for appliance change or resting period.  Outcome: Progressing     Problem: Neurosensory - Adult  Goal: Absence of seizures  Outcome: Progressing  Flowsheets (Taken 5/29/2024 2038)  Absence of seizures:   Monitor for seizure activity.  If seizure occurs, document type and location of movements and any associated apnea   If seizure occurs, turn head to side and suction secretions as needed   Administer anticonvulsants as ordered   Support airway/breathing, administer oxygen as needed   Diagnostic studies as ordered     Problem: Skin/Tissue Integrity - Adult  Goal: Skin integrity remains intact  Outcome: Progressing  Flowsheets  Taken 5/30/2024 0110  Skin Integrity Remains Intact: Monitor for areas of redness and/or skin breakdown  Taken 5/29/2024 2038  Skin Integrity Remains  Intact: Monitor for areas of redness and/or skin breakdown     Problem: Gastrointestinal - Adult  Goal: Maintains or returns to baseline bowel function  Outcome: Progressing  Flowsheets (Taken 5/29/2024 2038)  Maintains or returns to baseline bowel function:   Assess bowel function   Encourage oral fluids to ensure adequate hydration   Administer ordered medications as needed     Problem: Genitourinary - Adult  Goal: Absence of urinary retention  Outcome: Progressing     Problem: Infection - Adult  Goal: Absence of infection during hospitalization  Outcome: Progressing  Flowsheets (Taken 5/29/2024 2038)  Absence of infection during hospitalization:   Assess and monitor for signs and symptoms of infection   Monitor lab/diagnostic results   Administer medications as ordered   Instruct and encourage patient and family to use good hand hygiene technique     Problem: Metabolic/Fluid and Electrolytes - Adult  Goal: Electrolytes maintained within normal limits  Outcome: Progressing  Flowsheets (Taken 5/29/2024 2038)  Electrolytes maintained within normal limits: Monitor labs and assess patient for signs and symptoms of electrolyte imbalances     Problem: Hematologic - Adult  Goal: Maintains hematologic stability  Outcome: Progressing  Flowsheets (Taken 5/29/2024 2038)  Maintains hematologic stability: Assess for signs and symptoms of bleeding or hemorrhage

## 2024-05-30 NOTE — PROGRESS NOTES
female presenting with subdural hematoma from fall from rollator. Pt's impairments in strength, balance, endurance, and safety awareness is limiting her overall functional mobility. Pt requires minimal to maximal assist for bed mobility, minimal assistance for transfers and ambulation, all of which are a decrease from PLOF. Pt scored a 10/28 on Tinetti balance test, indicating a high fall risk. Pt would benefit from continued skilled therapy services for gains in overall functional mobility and improved quality of life.  Therapy Prognosis: Fair    Discharge Recommendations:  Discharge Recommendations: Continue to assess pending progress, 24 hour supervision or assist, Patient would benefit from continued therapy after discharge    Patient Education:      .    Patient Education  Education Given To: Patient, Family (Spouse)  Education Provided: Role of Therapy, Energy Conservation, Plan of Care, Precautions, Transfer Training, Equipment, Fall Prevention Strategies  Education Method: Verbal  Barriers to Learning: Cognition  Education Outcome: Continued education needed       Equipment Recommendations:  Equipment Needed: No    Plan:  Current Treatment Recommendations: Strengthening, Balance training, Endurance training, Functional mobility training, Transfer training, Gait training, Home exercise program, Equipment evaluation, education, & procurement, Therapeutic activities, Patient/Caregiver education & training, Safety education & training, Cognitive reorientation, Neuromuscular re-education, Wheelchair mobility training, ROM  General Plan:  (5x/wk for 90 minutes)    Goals:  Patient Goals : per family, improve endurance, transfers,walking and performing ADLs  Short Term Goals  Time Frame for Short Term Goals: 1 wk from evaluation  Short Term Goal 1: Pt will perform sup<>sit in bed with HOB flat and 1 rail with mod A in order to exit bed  Short Term Goal 2: Pt will sit to stand without posterior trunk lean and safe  hand placement with CGA and RW in order to safely transfer from various surfaces  Short Term Goal 3: Pt will ambulate 50' with RW and CGA for improved functional mobility  Short Term Goal 4: Pt to perform ramp ambulation with RW and mod A  for progressing to home entry  Long Term Goals  Time Frame for Long Term Goals : 3 weeks from evaluation  Long Term Goal 1: Pt will perform sup<> sit in bed with HOB flat and no rail with min A for ease of getting out bed  Long Term Goal 2: Pt will sit<>stand without posterior trunk lean and safe hand placement with SBA and RW in order to safely transfer for safety in the home  Long Term Goal 3: Pt will ambulate 100' RW achieving heel strike more than 75% of the time with CGA for safety in community  Long Term Goal 4: Pt will ascend/descend ramp with RW and min A for safe home entry  Long Term Goal 5: Pt to improve Tinetti score to 14 (MDC=4) in order to progress towards reducing fall risk  Additional Goals?: Yes  Long term goal 6: Pt to peform car transfer with RW and CGA in order to go out into her community    Following session, patient left in safe position with all fall risk precautions in place.   Sharona Wiley , licensed Therapist was present and directly responsible for all treatments provided by, JOSLYN Tate.

## 2024-05-30 NOTE — PROGRESS NOTES
[facility]  SPEECH THERAPY  [facility]  Speech - Language - Cognitive Evaluation    SLP Individual Minutes  Time In: 1430  Time Out: 1500  Minutes: 30  Timed Code Treatment Minutes: 0 Minutes       Date: 2024  Patient Name: Deloris Watts      CSN: [id number]   : 1955  (68 y.o.)  Gender: female   Referring Physician:  Dr. Jax Whatley MD  Diagnosis: Subdural Hematoma, acute (HCC)  Precautions: Fall risk, Seizure precautions   History of Present Illness/Injury: Per physician H&P: \" Deloris Watts is a 68 y.o. right-handed slim  female with history of hypertension, hyperlipidemia, hydronephrosis, fibromyalgia, irritable bowel syndrome, essential tremor, osteoporosis, end-stage renal disease on hemodialysis (MWF) since 2023, stroke with left side weakness in , anemia, depression, anxiety, right wrist and right ankle fracture treated conservatively, right proximal humeral fracture due to fall requiring ORIF, status post right carpal tunnel release surgery, status post cervical spine surgery, status post hysterectomy and bilateral oophorectomy, hemorrhoidectomy, sinus surgery, tubal ligation, L2 compression fracture requiring kyphoplasty, LASEK surgery, left femur neck displaced fracture due to fall on 3/10/2024 requiring left hip hemiarthroplasty on 3/11/2024, is admitted to the inpatient rehabilitation unit on 2024 for intensive inpatient rehabilitation treatment of impairment and disability secondary to fall resulting acute left parafalcine subdural hematoma.     The patient is known to inpatient rehab service.  She was previously in our inpatient rehab service from 2023 to 2024 after a fall accident on 2023 resulting L2 compression fracture requiring kyphoplasty, and liver laceration.  She was discharged to home on 2024 with referral for outpatient PT, OT and speech therapy.  She again sustained a fall  multiple rounds of speech therapy for cognition with minimal improvement, with  stating \"I have come to accept her current state of cognition and realize that it likely will not improve.\" Given prior CVA and noted decline over the past few years as well as head CT indicating encephalomalacia in the right MCA region and left cerebellar hemisphere, as well as mild atrophy, anticipate guarded prognosis regarding cognitive recovery.  politely declining cognitive intervention at this time, with focus for recovery on physical mobility. Please re-consult should  and/or patient wish to pursue cognitive therapy.  Rehabilitation Potential: poor  Discharge Recommendations:  No further skilled ST warranted at this time    EDUCATION:  Learner: Patient and Significant Other  Education:  Reviewed results and recommendations of this evaluation  Evaluation of Education: Verbalizes understanding    PLAN:  No further speech therapy services indicated.    PATIENT GOAL:    Improve transfers, walking and endurance.         Ashanti Valente, MS, CCC-SLP [id number]

## 2024-05-31 ENCOUNTER — TELEPHONE (OUTPATIENT)
Dept: FAMILY MEDICINE CLINIC | Age: 69
End: 2024-05-31

## 2024-05-31 LAB
ALBUMIN SERPL BCG-MCNC: 3.3 G/DL (ref 3.5–5.1)
ALP SERPL-CCNC: 130 U/L (ref 38–126)
ALT SERPL W/O P-5'-P-CCNC: 11 U/L (ref 11–66)
ANION GAP SERPL CALC-SCNC: 11 MEQ/L (ref 8–16)
ANION GAP SERPL CALC-SCNC: 17 MEQ/L (ref 8–16)
AST SERPL-CCNC: 14 U/L (ref 5–40)
BASOPHILS ABSOLUTE: 0.1 THOU/MM3 (ref 0–0.1)
BASOPHILS NFR BLD AUTO: 1.3 %
BILIRUB SERPL-MCNC: 0.2 MG/DL (ref 0.3–1.2)
BUN SERPL-MCNC: 28 MG/DL (ref 7–22)
BUN SERPL-MCNC: 34 MG/DL (ref 7–22)
CALCIUM SERPL-MCNC: 8.4 MG/DL (ref 8.5–10.5)
CALCIUM SERPL-MCNC: 8.8 MG/DL (ref 8.5–10.5)
CHLORIDE SERPL-SCNC: 103 MEQ/L (ref 98–111)
CHLORIDE SERPL-SCNC: 103 MEQ/L (ref 98–111)
CO2 SERPL-SCNC: 21 MEQ/L (ref 23–33)
CO2 SERPL-SCNC: 25 MEQ/L (ref 23–33)
CREAT SERPL-MCNC: 5.7 MG/DL (ref 0.4–1.2)
CREAT SERPL-MCNC: 5.8 MG/DL (ref 0.4–1.2)
DEPRECATED RDW RBC AUTO: 57.5 FL (ref 35–45)
DEPRECATED RDW RBC AUTO: 58.3 FL (ref 35–45)
EOSINOPHIL NFR BLD AUTO: 1.6 %
EOSINOPHILS ABSOLUTE: 0.1 THOU/MM3 (ref 0–0.4)
ERYTHROCYTE [DISTWIDTH] IN BLOOD BY AUTOMATED COUNT: 15.2 % (ref 11.5–14.5)
ERYTHROCYTE [DISTWIDTH] IN BLOOD BY AUTOMATED COUNT: 15.3 % (ref 11.5–14.5)
GFR SERPL CREATININE-BSD FRML MDRD: 7 ML/MIN/1.73M2
GFR SERPL CREATININE-BSD FRML MDRD: 7 ML/MIN/1.73M2
GLUCOSE SERPL-MCNC: 150 MG/DL (ref 70–108)
GLUCOSE SERPL-MCNC: 83 MG/DL (ref 70–108)
HCT VFR BLD AUTO: 37.5 % (ref 37–47)
HCT VFR BLD AUTO: 42.4 % (ref 37–47)
HGB BLD-MCNC: 11.6 GM/DL (ref 12–16)
HGB BLD-MCNC: 12.7 GM/DL (ref 12–16)
IMM GRANULOCYTES # BLD AUTO: 0.08 THOU/MM3 (ref 0–0.07)
IMM GRANULOCYTES NFR BLD AUTO: 1.3 %
LYMPHOCYTES ABSOLUTE: 1.7 THOU/MM3 (ref 1–4.8)
LYMPHOCYTES NFR BLD AUTO: 27.2 %
MAGNESIUM SERPL-MCNC: 2.4 MG/DL (ref 1.6–2.4)
MAGNESIUM SERPL-MCNC: 2.5 MG/DL (ref 1.6–2.4)
MCH RBC QN AUTO: 31 PG (ref 26–33)
MCH RBC QN AUTO: 32 PG (ref 26–33)
MCHC RBC AUTO-ENTMCNC: 30 GM/DL (ref 32.2–35.5)
MCHC RBC AUTO-ENTMCNC: 30.9 GM/DL (ref 32.2–35.5)
MCV RBC AUTO: 103.4 FL (ref 81–99)
MCV RBC AUTO: 103.6 FL (ref 81–99)
MONOCYTES ABSOLUTE: 0.7 THOU/MM3 (ref 0.4–1.3)
MONOCYTES NFR BLD AUTO: 11.6 %
NEUTROPHILS ABSOLUTE: 3.6 THOU/MM3 (ref 1.8–7.7)
NEUTROPHILS NFR BLD AUTO: 57 %
NRBC BLD AUTO-RTO: 0 /100 WBC
PHOSPHATE SERPL-MCNC: 4.1 MG/DL (ref 2.4–4.7)
PLATELET # BLD AUTO: 283 THOU/MM3 (ref 130–400)
PLATELET # BLD AUTO: 287 THOU/MM3 (ref 130–400)
PMV BLD AUTO: 10.6 FL (ref 9.4–12.4)
PMV BLD AUTO: 10.8 FL (ref 9.4–12.4)
POTASSIUM SERPL-SCNC: 4.3 MEQ/L (ref 3.5–5.2)
POTASSIUM SERPL-SCNC: 4.3 MEQ/L (ref 3.5–5.2)
PROT SERPL-MCNC: 5.3 G/DL (ref 6.1–8)
RBC # BLD AUTO: 3.62 MILL/MM3 (ref 4.2–5.4)
RBC # BLD AUTO: 4.1 MILL/MM3 (ref 4.2–5.4)
SODIUM SERPL-SCNC: 139 MEQ/L (ref 135–145)
SODIUM SERPL-SCNC: 141 MEQ/L (ref 135–145)
WBC # BLD AUTO: 5.9 THOU/MM3 (ref 4.8–10.8)
WBC # BLD AUTO: 6.4 THOU/MM3 (ref 4.8–10.8)

## 2024-05-31 PROCEDURE — 1180000000 HC REHAB R&B

## 2024-05-31 PROCEDURE — 99232 SBSQ HOSP IP/OBS MODERATE 35: CPT | Performed by: INTERNAL MEDICINE

## 2024-05-31 PROCEDURE — 90791 PSYCH DIAGNOSTIC EVALUATION: CPT | Performed by: PSYCHOLOGIST

## 2024-05-31 PROCEDURE — 85025 COMPLETE CBC W/AUTO DIFF WBC: CPT

## 2024-05-31 PROCEDURE — 80053 COMPREHEN METABOLIC PANEL: CPT

## 2024-05-31 PROCEDURE — 99232 SBSQ HOSP IP/OBS MODERATE 35: CPT | Performed by: PHYSICAL MEDICINE & REHABILITATION

## 2024-05-31 PROCEDURE — 84100 ASSAY OF PHOSPHORUS: CPT

## 2024-05-31 PROCEDURE — 97535 SELF CARE MNGMENT TRAINING: CPT

## 2024-05-31 PROCEDURE — 5A1D70Z PERFORMANCE OF URINARY FILTRATION, INTERMITTENT, LESS THAN 6 HOURS PER DAY: ICD-10-PCS | Performed by: INTERNAL MEDICINE

## 2024-05-31 PROCEDURE — 83735 ASSAY OF MAGNESIUM: CPT

## 2024-05-31 PROCEDURE — 85027 COMPLETE CBC AUTOMATED: CPT

## 2024-05-31 PROCEDURE — 6360000002 HC RX W HCPCS: Performed by: PHYSICAL MEDICINE & REHABILITATION

## 2024-05-31 PROCEDURE — 97110 THERAPEUTIC EXERCISES: CPT

## 2024-05-31 PROCEDURE — 97530 THERAPEUTIC ACTIVITIES: CPT

## 2024-05-31 PROCEDURE — 90935 HEMODIALYSIS ONE EVALUATION: CPT

## 2024-05-31 PROCEDURE — 97116 GAIT TRAINING THERAPY: CPT

## 2024-05-31 PROCEDURE — 97112 NEUROMUSCULAR REEDUCATION: CPT

## 2024-05-31 PROCEDURE — 36415 COLL VENOUS BLD VENIPUNCTURE: CPT

## 2024-05-31 PROCEDURE — 6370000000 HC RX 637 (ALT 250 FOR IP): Performed by: PHYSICAL MEDICINE & REHABILITATION

## 2024-05-31 RX ADMIN — PRIMIDONE 50 MG: 50 TABLET ORAL at 07:07

## 2024-05-31 RX ADMIN — MEGESTROL ACETATE 40 MG: 40 TABLET ORAL at 07:07

## 2024-05-31 RX ADMIN — MONTELUKAST SODIUM 10 MG: 10 TABLET ORAL at 07:07

## 2024-05-31 RX ADMIN — ACETAMINOPHEN 650 MG: 325 TABLET ORAL at 17:25

## 2024-05-31 RX ADMIN — ACETAMINOPHEN 650 MG: 325 TABLET ORAL at 05:58

## 2024-05-31 RX ADMIN — LEVETIRACETAM 500 MG: 500 TABLET, FILM COATED ORAL at 07:07

## 2024-05-31 RX ADMIN — AMLODIPINE BESYLATE 5 MG: 5 TABLET ORAL at 07:07

## 2024-05-31 RX ADMIN — HEPARIN SODIUM 5000 UNITS: 5000 INJECTION INTRAVENOUS; SUBCUTANEOUS at 20:37

## 2024-05-31 RX ADMIN — TRAZODONE HYDROCHLORIDE 50 MG: 50 TABLET ORAL at 20:37

## 2024-05-31 RX ADMIN — HEPARIN SODIUM 5000 UNITS: 5000 INJECTION INTRAVENOUS; SUBCUTANEOUS at 07:08

## 2024-05-31 RX ADMIN — BUPROPION HYDROCHLORIDE 150 MG: 150 TABLET, EXTENDED RELEASE ORAL at 07:07

## 2024-05-31 RX ADMIN — SENNOSIDES 8.6 MG: 8.6 TABLET ORAL at 20:37

## 2024-05-31 RX ADMIN — SERTRALINE HYDROCHLORIDE 50 MG: 50 TABLET ORAL at 07:07

## 2024-05-31 RX ADMIN — ATORVASTATIN CALCIUM 10 MG: 10 TABLET, FILM COATED ORAL at 07:07

## 2024-05-31 RX ADMIN — SEVELAMER CARBONATE 800 MG: 800 TABLET, FILM COATED ORAL at 11:09

## 2024-05-31 RX ADMIN — BUPROPION HYDROCHLORIDE 300 MG: 300 TABLET, EXTENDED RELEASE ORAL at 20:37

## 2024-05-31 RX ADMIN — ACETAMINOPHEN 650 MG: 325 TABLET ORAL at 11:08

## 2024-05-31 RX ADMIN — DOCUSATE SODIUM 100 MG: 100 CAPSULE, LIQUID FILLED ORAL at 07:07

## 2024-05-31 RX ADMIN — FLUTICASONE PROPIONATE 2 SPRAY: 50 SPRAY, METERED NASAL at 07:07

## 2024-05-31 RX ADMIN — SEVELAMER CARBONATE 800 MG: 800 TABLET, FILM COATED ORAL at 07:07

## 2024-05-31 RX ADMIN — DOCUSATE SODIUM 100 MG: 100 CAPSULE, LIQUID FILLED ORAL at 20:37

## 2024-05-31 RX ADMIN — POLYETHYLENE GLYCOL 3350 17 G: 17 POWDER, FOR SOLUTION ORAL at 07:07

## 2024-05-31 RX ADMIN — SEVELAMER CARBONATE 800 MG: 800 TABLET, FILM COATED ORAL at 17:25

## 2024-05-31 ASSESSMENT — PAIN SCALES - GENERAL: PAINLEVEL_OUTOF10: 0

## 2024-05-31 NOTE — PLAN OF CARE
Problem: Discharge Planning  Goal: Discharge to home or other facility with appropriate resources  Outcome: Progressing  Flowsheets (Taken 5/31/2024 0705)  Discharge to home or other facility with appropriate resources: Identify barriers to discharge with patient and caregiver     Problem: Safety - Adult  Goal: Free from fall injury  5/31/2024 1047 by Juan Key RN  Outcome: Progressing  Flowsheets (Taken 5/31/2024 1044)  Free From Fall Injury: Instruct family/caregiver on patient safety     Problem: ABCDS Injury Assessment  Goal: Absence of physical injury  5/31/2024 1047 by Juan Key RN  Outcome: Progressing  Flowsheets (Taken 5/31/2024 1047)  Absence of Physical Injury: Implement safety measures based on patient assessment     Problem: Skin/Tissue Integrity  Goal: Absence of new skin breakdown  Description: 1.  Monitor for areas of redness and/or skin breakdown  2.  Assess vascular access sites hourly  3.  Every 4-6 hours minimum:  Change oxygen saturation probe site  4.  Every 4-6 hours:  If on nasal continuous positive airway pressure, respiratory therapy assess nares and determine need for appliance change or resting period.  5/31/2024 1047 by Juan Key RN  Outcome: Progressing     Problem: Neurosensory - Adult  Goal: Absence of seizures  5/31/2024 1047 by Juan Key RN  Outcome: Progressing  Flowsheets (Taken 5/31/2024 1047)  Absence of seizures: Monitor for seizure activity.  If seizure occurs, document type and location of movements and any associated apnea     Problem: Skin/Tissue Integrity - Adult  Goal: Skin integrity remains intact  Outcome: Progressing  Flowsheets  Taken 5/31/2024 1044 by Juan Key RN  Skin Integrity Remains Intact: Monitor for areas of redness and/or skin breakdown  Taken 5/31/2024 0705 by Juan Key RN  Skin Integrity Remains Intact: Monitor for areas of redness and/or skin breakdown  Taken 5/31/2024 0018 by Yessica Fu LPN  Skin Integrity

## 2024-05-31 NOTE — CONSULTS
[place], [place]     PSYCHOLOGY CONSULTATION REPORT    TO:Jax Whatley MD  PATIENT: Deloris Watts  : 1955   MR NUMBER: [id number]  FROM: Reina Mendoza, Ph. D.   DATE: 2024      REPORT OF CONSULTATION: FINDINGS, OPINIONS and RECOMMENDATIONS     REASON FOR REFERRAL: The patient was referred for evaluation due to concerns about emotional status and coping in the wake of recent admission. This occurred after patient experienced another fall. She is well known to me from treating her on an outpatient basis when she was recovering from a significant CVA 10+ years ago. She has been exhibiting confusion and mental status changes with fluctuation and occasional visual hallucinations, and also an overall decline in cognition over the years.     Patient presents with the following presenting problems:   Patient Active Problem List   Diagnosis    Cerebral infarction (HCC)    Hx of Chest pain, atypical- tenderness on the left lower chest wall    Anxiety disorder    History of CVA (cerebrovascular accident)    Hyperlipidemia    Irritable bowel syndrome    Abdominal adhesions    Allergic rhinitis    Essential hypertension    Generalized anxiety disorder    Chronic kidney disease with in-center hemodialysis preferred by patient    Age related osteoporosis    Major depression, recurrent (HCC)    Environmental and seasonal allergies    Hearing loss of right ear due to cerumen impaction    Recurrent sinusitis    Abnormal EKG    Postural dizziness    SOB (shortness of breath) on exertion    Encephalopathy due to COVID-19 virus    Lumbosacral radiculopathy    Degeneration of lumbar intervertebral disc    Acute renal failure (HCC)    Metabolic acidosis    Hypokalemia    Hypomagnesemia    Hyperphosphatemia    Hypocalcemia    Hemoptysis    ERUM (acute kidney injury) (HCC)    Nosebleed    Anemia due to chronic kidney disease, on chronic dialysis (HCC)    Hypernatremia    Abdominal pain     likely combination of fluctuating acute delirium with VH superimposed on moderate cognitive decline.   Presumed etiology of cognitive deficits is multifactorial, with some likely renal/metabolic, some due to potential dehydration and debility, some due to progressing neurological decline  The patient's emotional state is non-depressed, anxious  Diagnosis: generalized anxiety disorder, acute delirium, moderate cognitive impairment  Factors that may impede progress are confusion, debility  Patient strengths and resources include good social support, strong landon    RECOMMENDATIONS:   I will follow patient via Rehab Team, and individually as needed during the hospital stay.  Medication recommendations: Tenzin had a lot of really good questions about patient's medications, and whether they might be too strong for her, now that she has lost so much weight. I agreed to pass on this question to her PCP and her Psychiatry providers.  Bright light phototherapy is recommended to stimulate brain recovery and improve mood and energy. I educated patient and  about this.   We worked together to build a plan to support her nutritional and fluid intake better. Daughter Jessica, who is an OT, will be working with her on a home exercise program that is more effective than what Tenzin was trying to do on his own.   Follow-up plan is for her to see me in a few months when she is stronger.     Time spent in evaluating patient, including face to face time with patient, review of records, consultation with staff, and documentation:  45 minutes      Thank you for the opportunity to participate in the care of this patient.     Reina Mendoza, Ph. D.

## 2024-05-31 NOTE — PROGRESS NOTES
Physical Therapy   [facility]  INPATIENT PHYSICAL THERAPY  DAILY NOTE  [facility] - 8K-12/012-A  Time In: 07  Time Out: 0859  Timed Code Treatment Minutes: 90 Minutes  Minutes: 90       Date: 2024  Patient Name: Deloris Watts,  Gender:  female        MRN: [id number]  : 1955  (68 y.o.)  Referral Date : 24  Referring Practitioner: Jax Whatley MD  Diagnosis: Subdural hematoma, acute (HCC)  Additional Pertinent Hx: Per H&P 24: Deloris Watts is a 68 y.o. right-handed  female with history of hypertension, osteoporosis, end-stage renal disease on hemodialysis (MWF) since 2023, stroke with left side weakness in , anemia, depression, anxiety, left hip hemiarthroplasty on 3/11/2024, and disability secondary to fall resulting acute left parafalcine subdural hematoma. The patient's  says he assisted the patient to sit on her rollator walker seat in living room and then pushing the rollator walker to bring the patient back to bedroom on 2024.  When they were near the bedroom door on the hallway, the patient suddenly slipped off the rollator walker seat and fell down to the floor with her head hitting the carpeted floor of the bedroom entrance.  She did not loss consciousness but felt some headache afterwards. CT of the head done on 2024 revealed small acute left parafalcine subdural hematoma.  Cervical spine CT on 2024 revealed no cervical spine injury.  Neurosurgery was consulted on 2024 for the acute subdural hematoma. A dose of tranexamic acid (TXA) was given.  No acute neurosurgical intervention was advised.  Neurosurgery note stated that Lovenox can be started on 2024 for DVT prophylaxis.  CT of head was repeated later on 2024 and showed stable CT scan of brain without interval change compared to previous study.     Prior Level of Function:  Lives With: Spouse  Type of Home: House  Home  Anna, Decreased Step Length Bilaterally, Decreased Gait Speed, Decreased Heel Strike Bilaterally, Narrow Base of Support, Mild Path Deviations, Unsteady Gait, and Decreased Terminal Knee Extension  Pt demonstrated decreased awareness of safety with RW on carpet. Pt requires cues for increasing stride length, remaining in the boundaries of the walker and to keep all 4 points of the walker on the ground.     Balance and Neurofacilitation:  Static Sitting Balance:  Stand By Assistance  Dynamic Sitting Balance: Stand By Assistance, Contact Guard Assistance  Static Standing Balance: Contact Guard Assistance, Minimal Assistance  Dynamic Standing Balance: Minimal Assistance  CGA for dynamic sitting balance with car transfer however SBA with other session's activities.  Performed a dynamic standing balance activity in order to challenge the patients ability to reach outside of her DENNIS and maintain balance without RW support.    -Ring toss (12 rings) x 2 with R arm at 6 foot distance  -Ring toss (12 rings) x 1 with L arm at 6 foot distance  -Ring toss (12 rings) x 1 with R arm at 3 foot distance   Distanced altered in order to motivate patient while continuing to challenge her ability to maintain DENNIS with reaching.    Exercise:  Patient was guided in 1 set(s) 12 reps of exercise to both lower extremities.  Seated marches, Seated hamstring curls, Seated heel/toe raises, Long arc quads, and Seated abduction/adduction, and kicking soccer ball.  Exercises were completed for increased independence with functional mobility.  Verbal and tactile cues for improved techniques for exercise.   Orange theraband for HS curls and seated abduction. Isometric ball squeeze for seated adduction.    Functional Outcome Measures: Not completed  Modified [place] Scale:  +4 - Moderately severe disability; unable to walk and attend to bodily needs without assistance.   Patient could not live alone and could not walk from one room to another

## 2024-05-31 NOTE — PROGRESS NOTES
[facility]   Nephrology progress note  5/31/2024, 10:28 AM      Pt Name:    Deloris Watts  MRN:     [id number]     YOB: 1955  Admit Date:    5/29/2024  4:40 PM    Chief Complaint: Nephrology following for ESRD on hemodialysis.    Subjective:  Patient seen and examined  No chest pain or shortness of breath  Feels okay undergoing rehab    Objective:  24HR INTAKE/OUTPUT:    Intake/Output Summary (Last 24 hours) at 5/31/2024 1028  Last data filed at 5/31/2024 0943  Gross per 24 hour   Intake 870 ml   Output --   Net 870 ml      Admission weight: 43.5 kg (95 lb 14.4 oz)  Wt Readings from Last 3 Encounters:   05/31/24 43 kg (94 lb 12.8 oz)   05/29/24 43.5 kg (95 lb 14.4 oz)   05/13/24 39.9 kg (88 lb)        Vitals :   Vitals:    05/30/24 2101 05/30/24 2108 05/31/24 0629 05/31/24 0644   BP: (!) 161/84 (!) 152/70  (!) 152/69   Pulse: 90   87   Resp: 18   16   Temp: 97.7 °F (36.5 °C)   97.7 °F (36.5 °C)   TempSrc: Oral   Oral   SpO2: 100%   100%   Weight:   43 kg (94 lb 12.8 oz)    Height:           Physical examination  General Appearance:  Well developed. No distress  Mouth/Throat:  Oral mucosa moist  Neck:  Supple, no JVD  Lungs:  Breath sounds: clear  Heart::  S1,S2 heard  Abdomen:  Soft, non - tender  Musculoskeletal:  Edema -no edema    Medications:  Infusion:    sodium chloride       Meds:    montelukast  10 mg Oral Daily    megestrol  40 mg Oral Daily    traZODone  50 mg Oral Nightly    amLODIPine  5 mg Oral Daily    buPROPion  150 mg Oral QAM    buPROPion  300 mg Oral Nightly    docusate sodium  100 mg Oral TID    fluticasone  2 spray Each Nostril Daily    primidone  50 mg Oral Daily    sertraline  50 mg Oral Daily    atorvastatin  10 mg Oral Daily    sodium chloride flush  5-40 mL IntraVENous 2 times per day    polyethylene glycol  17 g Oral Daily    sevelamer  800 mg Oral TID WC    levETIRAcetam  500 mg Oral Daily    senna  1 tablet Oral Nightly    acetaminophen  650 mg Oral

## 2024-05-31 NOTE — PROGRESS NOTES
[facility]  Occupational Therapy  Daily Note  Time:   Time In: 933  Time Out: 1103  Timed Code Treatment Minutes: 90 Minutes  Minutes: 90          Date: 2024  Patient Name: Deloris Watts,   Gender: female      Room: [facility]12/012  MRN: [id number]  : 1955  (68 y.o.)  Referring Practitioner: Dr. ZULEMA Whatley  Diagnosis: Subdural hematoma  Additional Pertinent Hx: Deloris Watts is a 68 y.o.  right-handed slim  female with history of hypertension, fibromyalgia, irritable bowel syndrome, essential tremor, end-stage renal disease on hemodialysis (MWF) since 2023, stroke with left side weakness in , depression, anxiety, right wrist and right ankle fracture treated conservatively, right proximal humeral fracture due to fall requiring ORIF, L2 compression fracture requiring kyphoplasty, LASEK surgery, left femur neck displaced fracture due to fall on 3/10/2024 requiring left hip hemiarthroplasty on 3/11/2024, was admitted on 2024 for intensive inpatient management of impairment & disability secondary to fall resulting acute left parafalcine subdural hematoma. Pt sustained a fall at home and brought to the hospital. CT of the head done on 2024 revealed small acute left parafalcine subdural hematoma.  Neurosurgery was consulted on 2024 for the acute subdural hematoma. A dose of tranexamic acid (TXA) was given.  No acute neurosurgical intervention was advised. Pt admitted to [facility] to regain strength, endurance and balance to improve indep with self care before returning home with spouse.    Restrictions/Precautions:  Restrictions/Precautions: Fall Risk, General Precautions, Seizure, Bed Alarm  Position Activity Restriction  Other position/activity restrictions: History of L hemiarthroplasty 3/11/24.  Perfect serve to Dr. Lezama sent  to clarify if patient continues with hip precautions- Per Dr. Lezama, no YONG precautions.

## 2024-05-31 NOTE — TELEPHONE ENCOUNTER
----- Message from Johana Weldon MD sent at 5/31/2024 11:06 AM EDT -----  Regarding: RE: deprescribing or dose reduction  She could stop her Zocor, Singulair, and Flonase.     ----- Message -----  From: Reina Mendoza, PhD  Sent: 5/31/2024  10:10 AM EDT  To: CARLOS MANUEL Chung - CNP; #  Subject: deprescribing or dose reduction                  Dear Colleagues,    I just saw Deloris today. She's on 8K for further rehab after her recent admission. With being on dialysis and her overall weight loss and frailty, her  Tenzin is interested in knowing whether any of her meds can have dosages lowered, or perhaps deprescribed. She c/o constant dry mouth and also is having some acute delirium. She's now on hemodialysis.     I told him I would pass on this request to the two of you.     Thanks!    Reina Mendoza

## 2024-05-31 NOTE — PLAN OF CARE
Problem: Discharge Planning  Goal: Discharge to home or other facility with appropriate resources  2024 1114 by Kusum Guajardo LISW  Note:   [facility]  Physical Medicine Case Management Assessment    [x] Inpatient Rehabilitation Unit    Patient Name: Deloris Watts        MRN: [id number]    : 1955  (68 y.o.)  Gender: female   Date of Admission: 2024  4:40 PM    Family/Social/Home Environment:   Prior to admission, patient was living with her , Tenzin. Patient reported needing assistance at home. Tenzin was assisting patient with ADLs, housekeeping, meal prep, errands, finances and driving. Tenzin was providing support to patient at home and would have someone stay with patient if he left the house. Patient was using a rollator to ambulate in the home. Patient continues to receive diaylsis from [facility] on ,  and . Patient's support includes: Layla Dave (child) and Nicole (sibling). Patient's family physician is Johana Weldon MD. Patient prefers [facility]. Patient reports having a walker, wheelchair and shower chair at home. Patient is motivated to participate in therapy.    Social/Functional History  Lives With: Spouse  Type of Home: House  Home Layout: One level  Home Access: Stairs to enter with rails  Entrance Stairs - Number of Steps: 1 MARIANA.  Pt has a ramp in the garage to enter the house.  pt's spouse uses a transport chair to assist patient in/out of house  Entrance Stairs - Rails: Left  Bathroom Shower/Tub: Tub/Shower unit, Shower chair with back, Curtain  Bathroom Toilet: Standard  Bathroom Equipment: Grab bars in shower, Shower chair  Bathroom Accessibility: Accessible  Home Equipment: Lift chair, Walker - 4-Wheeled, Wheelchair - Manual, Walker - Rolling, Reacher, Grab bars (transport wheelchair)  Has the patient had two or more falls in the past year or any fall with injury in the past year?: Yes  Receives Help From:  Wednesdays and Fridays. Patient's support includes: Layla Dave (child) and Nicole (sibling). Patient's family physician is Johana Weldon MD. Patient prefers [facility]. Patient reports having a walker, wheelchair and shower chair at home. Patient is motivated to participate in therapy. SW educated patient on Tuesday, 6/4 team conference.    SW left a message for Tenzin on this date to introduce self and inform him of team conference.    SW will follow and maintain involvement in discharge planning.        Read-Only, Retired: Discharge Planning  Living Arrangements: Spouse/Significant Other  Support Systems: Spouse/Significant Other, Family Members  Current DME Prior to Arrival: Walker, Shower Chair  Potential Assistance Needed: Skilled Nursing Facility  Potential Assistance Purchasing Medications: No  Type of Home Care Services: None  Patient expects to be discharged to:: Skilled nursing facility  Expected Discharge Date:  (Undetermined)  Follow Up Appointment: Best Day/Time : Monday AM      SHALINI To 5/31/2024 11:14 AM

## 2024-05-31 NOTE — CONSULTS
Department of Family Practice  Consult Note        Reason for Consult:  Medical management while on the Inpatient Rehab unit.    Requesting Physician:  Dr Whatley    CHIEF COMPLAINT:   The need to continue the intensive time with therapies following the acute hospital stay.    History Obtained From:  patient, EMR    HISTORY OF PRESENT ILLNESS:              The patient is a 68 y.o. female with significant past medical history of       Diagnosis Date    Allergic rhinitis     Anemia in CKD (chronic kidney disease)     Anxiety     CHF (congestive heart failure) ([facility])     Closed fracture of right proximal humerus 09/18/2021    ORIF    Closed right ankle fracture     In 1990s; treated with casting    CVA (cerebral infarction)     in 1990s ; with left-sided weakness    Depression     Fibromyalgia     in 1990s    Headache(784.0)     Hemiplegia and hemiparesis following cerebral infarction affecting left non-dominant side (HCC)     in 1990s    Hydronephrosis 09/01/2023    Urogenital Implants, calculus of ureter, UTI    Hyperlipidemia     Hypertension     in 1980s    Irritable bowel syndrome     in 1990s    Kidney failure     Chronic Kidney Disease, Renal Dialysis started in 1/2023    Osteoporosis 2019    Unspecified cerebral artery occlusion with cerebral infarction     Unspecified sleep apnea     Wrist fracture, right 2018    Treated with casting      who presents with a fall from the seat of her rollator and she struck her head on the carpeted floor. She was brought to the ED and found to have a subdural hematoma. She did not require surgery but did get a dose of TXA. She had no worsening and so has come to the Inpatient Rehab Unit to continue the time with therapies prior to a discharge disposition being made.      Past Medical History:        Diagnosis Date    Allergic rhinitis     Anemia in CKD (chronic kidney disease)     Anxiety     CHF (congestive heart failure) ([facility])     Closed fracture of right proximal humerus

## 2024-05-31 NOTE — FLOWSHEET NOTE
05/31/24 1300 05/31/24 1618   Vital Signs   BP (!) 149/63 (!) 186/85   Temp 97.8 °F (36.6 °C) 98 °F (36.7 °C)   Pulse 97 81   Respirations 16 18   SpO2 (!) 10 % 99 %   Weight - Scale 38.8 kg (85 lb 8.6 oz) 38.8 kg (85 lb 8.6 oz)   Weight Method Bed scale Bed scale   Percent Weight Change -9.77 0   Pain Assessment   Pain Assessment None - Denies Pain  --    Post-Hemodialysis Assessment   Post-Treatment Procedures  --  Blood returned;Catheter Capped, clamped with Saline x2 ports   Machine Disinfection Process  --  Acid/Vinegar Clean;Heat Disinfect;Exterior Machine Disinfection   Blood Volume Processed (Liters)  --  63.8 L   Dialyzer Clearance  --  Lightly streaked   Duration of Treatment (minutes)  --  150 minutes   Hemodialysis Intake (ml)  --  400 ml   Hemodialysis Output (ml)  --  400 ml   NET Removed (ml)  --  0   Tolerated Treatment  --  Good     Stable 2.5 hour treatment complete. Removed no fluid. Tolerated treatment well. HD catheter ports flushed, clamped and capped. Dressing clean, dry and intact. Report given to primary RN. Treatment record printed for scanning into EMR.

## 2024-06-01 LAB
ANION GAP SERPL CALC-SCNC: 9 MEQ/L (ref 8–16)
BACTERIA URNS QL MICRO: ABNORMAL /HPF
BASOPHILS ABSOLUTE: 0.1 THOU/MM3 (ref 0–0.1)
BASOPHILS NFR BLD AUTO: 1.3 %
BILIRUB UR QL STRIP.AUTO: NEGATIVE
BUN SERPL-MCNC: 20 MG/DL (ref 7–22)
CALCIUM SERPL-MCNC: 8.6 MG/DL (ref 8.5–10.5)
CASTS #/AREA URNS LPF: ABNORMAL /LPF
CASTS 2: ABNORMAL /LPF
CHARACTER UR: CLEAR
CHLORIDE SERPL-SCNC: 103 MEQ/L (ref 98–111)
CO2 SERPL-SCNC: 29 MEQ/L (ref 23–33)
COLOR: YELLOW
CREAT SERPL-MCNC: 3.1 MG/DL (ref 0.4–1.2)
CRYSTALS URNS MICRO: ABNORMAL
DEPRECATED RDW RBC AUTO: 56 FL (ref 35–45)
EOSINOPHIL NFR BLD AUTO: 2.1 %
EOSINOPHILS ABSOLUTE: 0.1 THOU/MM3 (ref 0–0.4)
EPITHELIAL CELLS, UA: ABNORMAL /HPF
ERYTHROCYTE [DISTWIDTH] IN BLOOD BY AUTOMATED COUNT: 15 % (ref 11.5–14.5)
GFR SERPL CREATININE-BSD FRML MDRD: 16 ML/MIN/1.73M2
GLUCOSE SERPL-MCNC: 75 MG/DL (ref 70–108)
GLUCOSE UR QL STRIP.AUTO: NEGATIVE MG/DL
HCT VFR BLD AUTO: 37.9 % (ref 37–47)
HGB BLD-MCNC: 11.7 GM/DL (ref 12–16)
HGB UR QL STRIP.AUTO: NEGATIVE
IMM GRANULOCYTES # BLD AUTO: 0.08 THOU/MM3 (ref 0–0.07)
IMM GRANULOCYTES NFR BLD AUTO: 1.3 %
KETONES UR QL STRIP.AUTO: NEGATIVE
LYMPHOCYTES ABSOLUTE: 2.7 THOU/MM3 (ref 1–4.8)
LYMPHOCYTES NFR BLD AUTO: 43.9 %
MAGNESIUM SERPL-MCNC: 2.2 MG/DL (ref 1.6–2.4)
MCH RBC QN AUTO: 31.4 PG (ref 26–33)
MCHC RBC AUTO-ENTMCNC: 30.9 GM/DL (ref 32.2–35.5)
MCV RBC AUTO: 101.6 FL (ref 81–99)
MISCELLANEOUS 2: ABNORMAL
MONOCYTES ABSOLUTE: 0.6 THOU/MM3 (ref 0.4–1.3)
MONOCYTES NFR BLD AUTO: 10 %
NEUTROPHILS ABSOLUTE: 2.6 THOU/MM3 (ref 1.8–7.7)
NEUTROPHILS NFR BLD AUTO: 41.4 %
NITRITE UR QL STRIP: NEGATIVE
NRBC BLD AUTO-RTO: 0 /100 WBC
PH UR STRIP.AUTO: >= 9 [PH] (ref 5–9)
PHOSPHATE SERPL-MCNC: 2.6 MG/DL (ref 2.4–4.7)
PLATELET # BLD AUTO: 254 THOU/MM3 (ref 130–400)
PMV BLD AUTO: 10.1 FL (ref 9.4–12.4)
POTASSIUM SERPL-SCNC: 4.5 MEQ/L (ref 3.5–5.2)
PROT UR STRIP.AUTO-MCNC: 100 MG/DL
RBC # BLD AUTO: 3.73 MILL/MM3 (ref 4.2–5.4)
RBC URINE: ABNORMAL /HPF
RENAL EPI CELLS #/AREA URNS HPF: ABNORMAL /[HPF]
SODIUM SERPL-SCNC: 141 MEQ/L (ref 135–145)
SP GR UR REFRACT.AUTO: 1.01 (ref 1–1.03)
UROBILINOGEN, URINE: 0.2 EU/DL (ref 0–1)
WBC # BLD AUTO: 6.2 THOU/MM3 (ref 4.8–10.8)
WBC #/AREA URNS HPF: ABNORMAL /HPF
WBC #/AREA URNS HPF: NEGATIVE /[HPF]
YEAST LIKE FUNGI URNS QL MICRO: ABNORMAL

## 2024-06-01 PROCEDURE — 36415 COLL VENOUS BLD VENIPUNCTURE: CPT

## 2024-06-01 PROCEDURE — 97530 THERAPEUTIC ACTIVITIES: CPT

## 2024-06-01 PROCEDURE — 97535 SELF CARE MNGMENT TRAINING: CPT

## 2024-06-01 PROCEDURE — 97110 THERAPEUTIC EXERCISES: CPT

## 2024-06-01 PROCEDURE — 80048 BASIC METABOLIC PNL TOTAL CA: CPT

## 2024-06-01 PROCEDURE — 84100 ASSAY OF PHOSPHORUS: CPT

## 2024-06-01 PROCEDURE — 6360000002 HC RX W HCPCS: Performed by: PHYSICAL MEDICINE & REHABILITATION

## 2024-06-01 PROCEDURE — 85025 COMPLETE CBC W/AUTO DIFF WBC: CPT

## 2024-06-01 PROCEDURE — 6370000000 HC RX 637 (ALT 250 FOR IP): Performed by: PHYSICAL MEDICINE & REHABILITATION

## 2024-06-01 PROCEDURE — 99232 SBSQ HOSP IP/OBS MODERATE 35: CPT | Performed by: INTERNAL MEDICINE

## 2024-06-01 PROCEDURE — 1180000000 HC REHAB R&B

## 2024-06-01 PROCEDURE — 83735 ASSAY OF MAGNESIUM: CPT

## 2024-06-01 PROCEDURE — 97116 GAIT TRAINING THERAPY: CPT

## 2024-06-01 PROCEDURE — 81001 URINALYSIS AUTO W/SCOPE: CPT

## 2024-06-01 RX ADMIN — TRAZODONE HYDROCHLORIDE 50 MG: 50 TABLET ORAL at 20:38

## 2024-06-01 RX ADMIN — BUPROPION HYDROCHLORIDE 150 MG: 150 TABLET, EXTENDED RELEASE ORAL at 09:30

## 2024-06-01 RX ADMIN — PRIMIDONE 50 MG: 50 TABLET ORAL at 09:30

## 2024-06-01 RX ADMIN — SERTRALINE HYDROCHLORIDE 50 MG: 50 TABLET ORAL at 09:31

## 2024-06-01 RX ADMIN — DOCUSATE SODIUM 100 MG: 100 CAPSULE, LIQUID FILLED ORAL at 17:23

## 2024-06-01 RX ADMIN — HEPARIN SODIUM 5000 UNITS: 5000 INJECTION INTRAVENOUS; SUBCUTANEOUS at 20:37

## 2024-06-01 RX ADMIN — AMLODIPINE BESYLATE 5 MG: 5 TABLET ORAL at 09:31

## 2024-06-01 RX ADMIN — SEVELAMER CARBONATE 800 MG: 800 TABLET, FILM COATED ORAL at 17:23

## 2024-06-01 RX ADMIN — SENNOSIDES 8.6 MG: 8.6 TABLET ORAL at 20:38

## 2024-06-01 RX ADMIN — ACETAMINOPHEN 650 MG: 325 TABLET ORAL at 06:40

## 2024-06-01 RX ADMIN — ACETAMINOPHEN 650 MG: 325 TABLET ORAL at 00:20

## 2024-06-01 RX ADMIN — DOCUSATE SODIUM 100 MG: 100 CAPSULE, LIQUID FILLED ORAL at 20:38

## 2024-06-01 RX ADMIN — SEVELAMER CARBONATE 800 MG: 800 TABLET, FILM COATED ORAL at 13:15

## 2024-06-01 RX ADMIN — DOCUSATE SODIUM 100 MG: 100 CAPSULE, LIQUID FILLED ORAL at 09:30

## 2024-06-01 RX ADMIN — MEGESTROL ACETATE 40 MG: 40 TABLET ORAL at 09:31

## 2024-06-01 RX ADMIN — ACETAMINOPHEN 650 MG: 325 TABLET ORAL at 13:15

## 2024-06-01 RX ADMIN — HEPARIN SODIUM 5000 UNITS: 5000 INJECTION INTRAVENOUS; SUBCUTANEOUS at 09:31

## 2024-06-01 RX ADMIN — FLUTICASONE PROPIONATE 2 SPRAY: 50 SPRAY, METERED NASAL at 09:31

## 2024-06-01 RX ADMIN — POLYETHYLENE GLYCOL 3350 17 G: 17 POWDER, FOR SOLUTION ORAL at 09:30

## 2024-06-01 RX ADMIN — MONTELUKAST SODIUM 10 MG: 10 TABLET ORAL at 09:31

## 2024-06-01 RX ADMIN — LEVETIRACETAM 500 MG: 500 TABLET, FILM COATED ORAL at 09:30

## 2024-06-01 RX ADMIN — SEVELAMER CARBONATE 800 MG: 800 TABLET, FILM COATED ORAL at 09:30

## 2024-06-01 RX ADMIN — ATORVASTATIN CALCIUM 10 MG: 10 TABLET, FILM COATED ORAL at 09:37

## 2024-06-01 RX ADMIN — BUPROPION HYDROCHLORIDE 300 MG: 300 TABLET, EXTENDED RELEASE ORAL at 20:38

## 2024-06-01 RX ADMIN — ACETAMINOPHEN 650 MG: 325 TABLET ORAL at 17:23

## 2024-06-01 ASSESSMENT — PAIN DESCRIPTION - DESCRIPTORS
DESCRIPTORS: ACHING
DESCRIPTORS: ACHING

## 2024-06-01 ASSESSMENT — PAIN SCALES - GENERAL
PAINLEVEL_OUTOF10: 2
PAINLEVEL_OUTOF10: 0
PAINLEVEL_OUTOF10: 0
PAINLEVEL_OUTOF10: 2

## 2024-06-01 ASSESSMENT — PAIN DESCRIPTION - LOCATION
LOCATION: HEAD
LOCATION: HEAD

## 2024-06-01 ASSESSMENT — PAIN DESCRIPTION - ORIENTATION
ORIENTATION: ANTERIOR
ORIENTATION: ANTERIOR

## 2024-06-01 NOTE — PLAN OF CARE
Problem: Discharge Planning  Goal: Discharge to home or other facility with appropriate resources  Outcome: Progressing  Flowsheets (Taken 6/1/2024 0915)  Discharge to home or other facility with appropriate resources:   Identify barriers to discharge with patient and caregiver   Arrange for needed discharge resources and transportation as appropriate   Identify discharge learning needs (meds, wound care, etc)   Refer to discharge planning if patient needs post-hospital services based on physician order or complex needs related to functional status, cognitive ability or social support system     Problem: Safety - Adult  Goal: Free from fall injury  6/1/2024 1137 by Lo Carrington RN  Outcome: Progressing  6/1/2024 0114 by Yessica Fu LPN  Outcome: Progressing  Flowsheets (Taken 6/1/2024 0114)  Free From Fall Injury: Instruct family/caregiver on patient safety     Problem: ABCDS Injury Assessment  Goal: Absence of physical injury  Outcome: Progressing     Problem: Skin/Tissue Integrity  Goal: Absence of new skin breakdown  Description: 1.  Monitor for areas of redness and/or skin breakdown  2.  Assess vascular access sites hourly  3.  Every 4-6 hours minimum:  Change oxygen saturation probe site  4.  Every 4-6 hours:  If on nasal continuous positive airway pressure, respiratory therapy assess nares and determine need for appliance change or resting period.  6/1/2024 1137 by Lo Carrington RN  Outcome: Progressing  6/1/2024 0114 by Yessica Fu LPN  Outcome: Progressing     Problem: Neurosensory - Adult  Goal: Absence of seizures  6/1/2024 1137 by Lo Carrington RN  Outcome: Progressing  Flowsheets (Taken 6/1/2024 0915)  Absence of seizures:   Monitor for seizure activity.  If seizure occurs, document type and location of movements and any associated apnea   If seizure occurs, turn head to side and suction secretions as needed  6/1/2024 0114 by Yessica Fu LPN  Outcome:  Progressing  Flowsheets (Taken 6/1/2024 0114)  Absence of seizures: Monitor for seizure activity.  If seizure occurs, document type and location of movements and any associated apnea     Problem: Skin/Tissue Integrity - Adult  Goal: Skin integrity remains intact  6/1/2024 1137 by Lo Carrington RN  Outcome: Progressing  Flowsheets  Taken 6/1/2024 1125  Skin Integrity Remains Intact: Monitor for areas of redness and/or skin breakdown  Taken 6/1/2024 0915  Skin Integrity Remains Intact: Monitor for areas of redness and/or skin breakdown  6/1/2024 0114 by Yessica Fu LPN  Outcome: Progressing  Flowsheets (Taken 6/1/2024 0113)  Skin Integrity Remains Intact: Monitor for areas of redness and/or skin breakdown     Problem: Musculoskeletal - Adult  Goal: Return mobility to safest level of function  6/1/2024 1137 by Lo Carrington RN  Outcome: Progressing  Flowsheets (Taken 6/1/2024 0915)  Return Mobility to Safest Level of Function:   Assess patient stability and activity tolerance for standing, transferring and ambulating with or without assistive devices   Assist with transfers and ambulation using safe patient handling equipment as needed   Ensure adequate protection for wounds/incisions during mobilization   Obtain physical therapy/occupational therapy consults as needed   Instruct patient/family in ordered activity level  6/1/2024 0114 by Yessica Fu LPN  Outcome: Progressing  Flowsheets (Taken 6/1/2024 0114)  Return Mobility to Safest Level of Function: Assess patient stability and activity tolerance for standing, transferring and ambulating with or without assistive devices     Problem: Gastrointestinal - Adult  Goal: Maintains or returns to baseline bowel function  6/1/2024 1137 by Lo Carrington RN  Outcome: Progressing  Flowsheets (Taken 6/1/2024 0915)  Maintains or returns to baseline bowel function:   Assess bowel function   Encourage oral fluids to ensure adequate hydration  6/1/2024 0114

## 2024-06-01 NOTE — CARE COORDINATION
Patient in bed  at bedside during nurse rounding. Denies SOB and pain. Patient ambulated to bathroom with assistance gait unsteady. Patient c/o of headache. Tylenol give as ordered. Patient awake majority of shift. Disoriented to time, asking when will breakfast and lunch get delivered. Patient ate half of a turkey sandwich.     Patient educated on how to use incentive spirometer. Patient verbalized understanding and demonstrated proper use. Emphasized importance and usage of device, with coughing and deep breathing every 2 hours while awake.

## 2024-06-01 NOTE — PROGRESS NOTES
[facility]  INPATIENT PHYSICAL THERAPY  [facility] - 8K-12/012-A  Daily Note    Time In: 0730  Time Out: 0830  Timed Code Treatment Minutes: 60 Minutes  Minutes: 60          Date: 2024  Patient Name: Deloris Watts,  Gender:  female        MRN: [id number]  : 1955  (68 y.o.)  Referral Date : 24  Referring Practitioner: Jax Whatley MD  Diagnosis: Subdural hematoma, acute (HCC)  Additional Pertinent Hx: Per H&P 24: Deloris Watts is a 68 y.o. right-handed  female with history of hypertension, osteoporosis, end-stage renal disease on hemodialysis (MWF) since 2023, stroke with left side weakness in , anemia, depression, anxiety, left hip hemiarthroplasty on 3/11/2024, and disability secondary to fall resulting acute left parafalcine subdural hematoma. The patient's  says he assisted the patient to sit on her rollator walker seat in living room and then pushing the rollator walker to bring the patient back to bedroom on 2024.  When they were near the bedroom door on the hallway, the patient suddenly slipped off the rollator walker seat and fell down to the floor with her head hitting the carpeted floor of the bedroom entrance.  She did not loss consciousness but felt some headache afterwards. CT of the head done on 2024 revealed small acute left parafalcine subdural hematoma.  Cervical spine CT on 2024 revealed no cervical spine injury.  Neurosurgery was consulted on 2024 for the acute subdural hematoma. A dose of tranexamic acid (TXA) was given.  No acute neurosurgical intervention was advised.  Neurosurgery note stated that Lovenox can be started on 2024 for DVT prophylaxis.  CT of head was repeated later on 2024 and showed stable CT scan of brain without interval change compared to previous study.     Prior Level of Function:  Lives With: Spouse  Type of Home: House  Home Layout: One  live alone and could not walk from one room to another without physical help from another person, but they can sit up in bed without any help.  Education provided regarding stroke rehabilitation management.     ASSESSMENT:  Assessment: Patient progressing toward established goals.  Activity Tolerance:  Patient tolerance of  treatment: good.     Equipment Recommendations:Equipment Needed: No  Discharge Recommendations: Continue to assess pending progress, 24 hour supervision or assist, Patient would benefit from continued therapy after discharge     PLAN: Current Treatment Recommendations: Strengthening, Balance training, Endurance training, Functional mobility training, Transfer training, Gait training, Home exercise program, Equipment evaluation, education, & procurement, Therapeutic activities, Patient/Caregiver education & training, Safety education & training, Cognitive reorientation, Neuromuscular re-education, Wheelchair mobility training, ROM  General Plan:  (5x/wk for 90 minutes)    Patient Education  Patient Education: Plan of Care, Functional Mobility, Reviewed Prior Education, Health Promotion and Wellness Education, Safety, Verbal Exercise Instruction, Precautions/Restrictions, Bed Mobility, Transfers, Gait, Home Safety Education, - Patient Requires Continued Education    Goals:  Patient Goals : per family, improve endurance, transfers,walking and performing ADLs  Short Term Goals  Time Frame for Short Term Goals: 1 wk from evaluation  Short Term Goal 1: Pt will perform sup<>sit in bed with HOB flat and 1 rail with mod A in order to exit bed  Short Term Goal 2: Pt will sit to stand without posterior trunk lean and safe hand placement with CGA and RW in order to safely transfer from various surfaces  Short Term Goal 3: Pt will ambulate 50' with RW and CGA for improved functional mobility  Short Term Goal 4: Pt to perform ramp ambulation with RW and mod A  for progressing to home entry  Long Term

## 2024-06-01 NOTE — PROGRESS NOTES
[facility]   Nephrology progress note  6/1/2024, 11:39 AM      Pt Name:    Deloris Watts  MRN:     [id number]     YOB: 1955  Admit Date:    5/29/2024  4:40 PM    Chief Complaint: Nephrology following for ESRD on hemodialysis.    Subjective:  Patient seen and examined  No chest pain or shortness of breath  Feels okay undergoing rehab    Objective:  24HR INTAKE/OUTPUT:    Intake/Output Summary (Last 24 hours) at 6/1/2024 1139  Last data filed at 6/1/2024 0003  Gross per 24 hour   Intake 510 ml   Output 400 ml   Net 110 ml        Admission weight: 43.5 kg (95 lb 14.4 oz)  Wt Readings from Last 3 Encounters:   06/01/24 39.1 kg (86 lb 3.2 oz)   05/29/24 43.5 kg (95 lb 14.4 oz)   05/13/24 39.9 kg (88 lb)        Vitals :   Vitals:    05/31/24 1725 05/31/24 2036 06/01/24 0600 06/01/24 0915   BP: (!) 148/70 (!) 149/70  (!) 140/96   Pulse: 94 95  86   Resp:  16  14   Temp:  97.7 °F (36.5 °C)  98.2 °F (36.8 °C)   TempSrc:  Oral  Oral   SpO2: 98% 98%  98%   Weight:   39.1 kg (86 lb 3.2 oz)    Height:           Physical examination  General Appearance:  Well developed. No distress  Mouth/Throat:  Oral mucosa moist  Neck:  Supple, no JVD  Lungs:  Breath sounds: clear  Heart::  S1,S2 heard  Abdomen:  Soft, non - tender  Musculoskeletal:  Edema -no edema    Medications:  Infusion:    sodium chloride       Meds:    montelukast  10 mg Oral Daily    megestrol  40 mg Oral Daily    traZODone  50 mg Oral Nightly    amLODIPine  5 mg Oral Daily    buPROPion  150 mg Oral QAM    buPROPion  300 mg Oral Nightly    docusate sodium  100 mg Oral TID    fluticasone  2 spray Each Nostril Daily    primidone  50 mg Oral Daily    sertraline  50 mg Oral Daily    atorvastatin  10 mg Oral Daily    sodium chloride flush  5-40 mL IntraVENous 2 times per day    polyethylene glycol  17 g Oral Daily    sevelamer  800 mg Oral TID WC    levETIRAcetam  500 mg Oral Daily    senna  1 tablet Oral Nightly    acetaminophen  650 mg  Oral Q6H    heparin (porcine)  5,000 Units SubCUTAneous 2 times per day       Lab Data :  CBC:   Recent Labs     05/31/24  0944 05/31/24  1311 06/01/24  0640   WBC 5.9 6.4 6.2   HGB 12.7 11.6* 11.7*   HCT 42.4 37.5 37.9    283 254       CMP:  Recent Labs     05/30/24  0547 05/31/24  0944 05/31/24  1311 06/01/24  0640    141 139 141   K 3.4* 4.3 4.3 4.5   CL 97* 103 103 103   CO2 27 21* 25 29   BUN 14 28* 34* 20   CREATININE 3.1* 5.7* 5.8* 3.1*   GLUCOSE 72 150* 83 75   CALCIUM 8.5 8.8 8.4* 8.6   MG 2.2 2.4 2.5* 2.2   PHOS 3.2 4.1  --  2.6       Hepatic:   Recent Labs     05/30/24  0547 05/31/24  1311   AST 16 14   ALT 10* 11   BILITOT 0.4 0.2*   ALKPHOS 126 130*         Assessment and Plan:  ESRD on HD MWF  Volume status and electrolytes reasonable no acute need for dialysis today  We will get her dialyzed in the morning  Subdural hematoma 2/2 fall  Essential hypertension  Hypokalemia doing better  Recurrent falls  Hyperphosphatemia, doing well  Elevated trop  Meds reviewed discussed with the patient     To Cuadra MD  [facility]    This report has been created using voice recognition software. It may contain minor errors which are inherent in voice recognition technology

## 2024-06-01 NOTE — PROGRESS NOTES
[facility]  Occupational Therapy  Daily Note  Time:   Time In: 08  Time Out: 0915  Timed Code Treatment Minutes: 45 Minutes  Minutes: 45          Date: 2024  Patient Name: Deloris Watts,   Gender: female      Room: [facility]12/012  MRN: [id number]  : 1955  (68 y.o.)  Referring Practitioner: Dr. ZULEMA Whatley  Diagnosis: Subdural hematoma  Additional Pertinent Hx: Deloris Watts is a 68 y.o.  right-handed slim  female with history of hypertension, fibromyalgia, irritable bowel syndrome, essential tremor, end-stage renal disease on hemodialysis (MWF) since 2023, stroke with left side weakness in , depression, anxiety, right wrist and right ankle fracture treated conservatively, right proximal humeral fracture due to fall requiring ORIF, L2 compression fracture requiring kyphoplasty, LASEK surgery, left femur neck displaced fracture due to fall on 3/10/2024 requiring left hip hemiarthroplasty on 3/11/2024, was admitted on 2024 for intensive inpatient management of impairment & disability secondary to fall resulting acute left parafalcine subdural hematoma. Pt sustained a fall at home and brought to the hospital. CT of the head done on 2024 revealed small acute left parafalcine subdural hematoma.  Neurosurgery was consulted on 2024 for the acute subdural hematoma. A dose of tranexamic acid (TXA) was given.  No acute neurosurgical intervention was advised. Pt admitted to [facility] to regain strength, endurance and balance to improve indep with self care before returning home with spouse.    Restrictions/Precautions:  Restrictions/Precautions: Fall Risk, General Precautions, Seizure, Bed Alarm  Position Activity Restriction  Other position/activity restrictions: History of L hemiarthroplasty 3/11/24.  Perfect serve to Dr. Lezama sent  to clarify if patient continues with hip precautions- Per Dr. Lezama, no YONG precautions.

## 2024-06-01 NOTE — PROGRESS NOTES
[facility]  Occupational Therapy  Daily Note  Time:   Time In: 1000  Time Out: 1045  Timed Code Treatment Minutes: 45 Minutes  Minutes: 45          Date: 2024  Patient Name: Deloris Watts,   Gender: female      Room: [facility]12/012-A  MRN: [id number]  : 1955  (68 y.o.)  Referring Practitioner: Dr. ZULEMA Whatley  Diagnosis: Subdural hematoma  Additional Pertinent Hx: Deloris Watts is a 68 y.o.  right-handed slim  female with history of hypertension, fibromyalgia, irritable bowel syndrome, essential tremor, end-stage renal disease on hemodialysis (MWF) since 2023, stroke with left side weakness in , depression, anxiety, right wrist and right ankle fracture treated conservatively, right proximal humeral fracture due to fall requiring ORIF, L2 compression fracture requiring kyphoplasty, LASEK surgery, left femur neck displaced fracture due to fall on 3/10/2024 requiring left hip hemiarthroplasty on 3/11/2024, was admitted on 2024 for intensive inpatient management of impairment & disability secondary to fall resulting acute left parafalcine subdural hematoma. Pt sustained a fall at home and brought to the hospital. CT of the head done on 2024 revealed small acute left parafalcine subdural hematoma.  Neurosurgery was consulted on 2024 for the acute subdural hematoma. A dose of tranexamic acid (TXA) was given.  No acute neurosurgical intervention was advised. Pt admitted to [facility] to regain strength, endurance and balance to improve indep with self care before returning home with spouse.    Restrictions/Precautions:  Restrictions/Precautions: Fall Risk, General Precautions, Seizure, Bed Alarm  Position Activity Restriction  Other position/activity restrictions: History of L hemiarthroplasty 3/11/24.  Perfect serve to Dr. Lezama sent  to clarify if patient continues with hip precautions- Per Dr. Lezama, no YONG precautions.

## 2024-06-01 NOTE — PROGRESS NOTES
[facility]  INPATIENT PHYSICAL THERAPY  [facility] - 8K-12/012-A  Daily Note    Time In: 1440  Time Out: 1510  Timed Code Treatment Minutes: 30 Minutes  Minutes: 30          Date: 2024  Patient Name: Deloris Watts,  Gender:  female        MRN: [id number]  : 1955  (68 y.o.)  Referral Date : 24  Referring Practitioner: Jax Whatley MD  Diagnosis: Subdural hematoma, acute (HCC)  Additional Pertinent Hx: Per H&P 24: Deloris Watts is a 68 y.o. right-handed  female with history of hypertension, osteoporosis, end-stage renal disease on hemodialysis (MWF) since 2023, stroke with left side weakness in , anemia, depression, anxiety, left hip hemiarthroplasty on 3/11/2024, and disability secondary to fall resulting acute left parafalcine subdural hematoma. The patient's  says he assisted the patient to sit on her rollator walker seat in living room and then pushing the rollator walker to bring the patient back to bedroom on 2024.  When they were near the bedroom door on the hallway, the patient suddenly slipped off the rollator walker seat and fell down to the floor with her head hitting the carpeted floor of the bedroom entrance.  She did not loss consciousness but felt some headache afterwards. CT of the head done on 2024 revealed small acute left parafalcine subdural hematoma.  Cervical spine CT on 2024 revealed no cervical spine injury.  Neurosurgery was consulted on 2024 for the acute subdural hematoma. A dose of tranexamic acid (TXA) was given.  No acute neurosurgical intervention was advised.  Neurosurgery note stated that Lovenox can be started on 2024 for DVT prophylaxis.  CT of head was repeated later on 2024 and showed stable CT scan of brain without interval change compared to previous study.     Prior Level of Function:  Lives With: Spouse  Type of Home: House  Home Layout: One

## 2024-06-01 NOTE — PLAN OF CARE
Problem: Safety - Adult  Goal: Free from fall injury  Outcome: Progressing  Flowsheets (Taken 6/1/2024 0114)  Free From Fall Injury: Instruct family/caregiver on patient safety     Problem: Skin/Tissue Integrity  Goal: Absence of new skin breakdown  Description: 1.  Monitor for areas of redness and/or skin breakdown  2.  Assess vascular access sites hourly  3.  Every 4-6 hours minimum:  Change oxygen saturation probe site  4.  Every 4-6 hours:  If on nasal continuous positive airway pressure, respiratory therapy assess nares and determine need for appliance change or resting period.  Outcome: Progressing     Problem: Neurosensory - Adult  Goal: Absence of seizures  Outcome: Progressing  Flowsheets (Taken 6/1/2024 0114)  Absence of seizures: Monitor for seizure activity.  If seizure occurs, document type and location of movements and any associated apnea     Problem: Skin/Tissue Integrity - Adult  Goal: Skin integrity remains intact  Outcome: Progressing  Flowsheets (Taken 6/1/2024 0113)  Skin Integrity Remains Intact: Monitor for areas of redness and/or skin breakdown     Problem: Musculoskeletal - Adult  Goal: Return mobility to safest level of function  Outcome: Progressing  Flowsheets (Taken 6/1/2024 0114)  Return Mobility to Safest Level of Function: Assess patient stability and activity tolerance for standing, transferring and ambulating with or without assistive devices     Problem: Gastrointestinal - Adult  Goal: Maintains or returns to baseline bowel function  Outcome: Progressing  Flowsheets (Taken 6/1/2024 0114)  Maintains or returns to baseline bowel function:   Assess bowel function   Encourage oral fluids to ensure adequate hydration   Administer ordered medications as needed     Problem: Chronic Conditions and Co-morbidities  Goal: Patient's chronic conditions and co-morbidity symptoms are monitored and maintained or improved  Outcome: Progressing  Flowsheets (Taken 6/1/2024 0114)  Care Plan -

## 2024-06-02 LAB
ANION GAP SERPL CALC-SCNC: 12 MEQ/L (ref 8–16)
BUN SERPL-MCNC: 36 MG/DL (ref 7–22)
CALCIUM SERPL-MCNC: 8.9 MG/DL (ref 8.5–10.5)
CHLORIDE SERPL-SCNC: 99 MEQ/L (ref 98–111)
CO2 SERPL-SCNC: 24 MEQ/L (ref 23–33)
CREAT SERPL-MCNC: 4.5 MG/DL (ref 0.4–1.2)
DEPRECATED RDW RBC AUTO: 58.1 FL (ref 35–45)
ERYTHROCYTE [DISTWIDTH] IN BLOOD BY AUTOMATED COUNT: 14.8 % (ref 11.5–14.5)
GFR SERPL CREATININE-BSD FRML MDRD: 10 ML/MIN/1.73M2
GLUCOSE SERPL-MCNC: 74 MG/DL (ref 70–108)
HCT VFR BLD AUTO: 41.7 % (ref 37–47)
HGB BLD-MCNC: 12.5 GM/DL (ref 12–16)
MAGNESIUM SERPL-MCNC: 2.5 MG/DL (ref 1.6–2.4)
MCH RBC QN AUTO: 31.8 PG (ref 26–33)
MCHC RBC AUTO-ENTMCNC: 30 GM/DL (ref 32.2–35.5)
MCV RBC AUTO: 106.1 FL (ref 81–99)
PHOSPHATE SERPL-MCNC: 4.1 MG/DL (ref 2.4–4.7)
PLATELET # BLD AUTO: 269 THOU/MM3 (ref 130–400)
PMV BLD AUTO: 10.5 FL (ref 9.4–12.4)
POTASSIUM SERPL-SCNC: 4.4 MEQ/L (ref 3.5–5.2)
RBC # BLD AUTO: 3.93 MILL/MM3 (ref 4.2–5.4)
SODIUM SERPL-SCNC: 135 MEQ/L (ref 135–145)
WBC # BLD AUTO: 7.2 THOU/MM3 (ref 4.8–10.8)

## 2024-06-02 PROCEDURE — 80048 BASIC METABOLIC PNL TOTAL CA: CPT

## 2024-06-02 PROCEDURE — 1180000000 HC REHAB R&B

## 2024-06-02 PROCEDURE — 36415 COLL VENOUS BLD VENIPUNCTURE: CPT

## 2024-06-02 PROCEDURE — 6360000002 HC RX W HCPCS: Performed by: PHYSICAL MEDICINE & REHABILITATION

## 2024-06-02 PROCEDURE — 83735 ASSAY OF MAGNESIUM: CPT

## 2024-06-02 PROCEDURE — 84100 ASSAY OF PHOSPHORUS: CPT

## 2024-06-02 PROCEDURE — 85027 COMPLETE CBC AUTOMATED: CPT

## 2024-06-02 PROCEDURE — 99232 SBSQ HOSP IP/OBS MODERATE 35: CPT | Performed by: INTERNAL MEDICINE

## 2024-06-02 PROCEDURE — 6370000000 HC RX 637 (ALT 250 FOR IP): Performed by: PHYSICAL MEDICINE & REHABILITATION

## 2024-06-02 RX ADMIN — SEVELAMER CARBONATE 800 MG: 800 TABLET, FILM COATED ORAL at 17:19

## 2024-06-02 RX ADMIN — ACETAMINOPHEN 650 MG: 325 TABLET ORAL at 13:17

## 2024-06-02 RX ADMIN — SEVELAMER CARBONATE 800 MG: 800 TABLET, FILM COATED ORAL at 13:17

## 2024-06-02 RX ADMIN — ACETAMINOPHEN 650 MG: 325 TABLET ORAL at 17:19

## 2024-06-02 RX ADMIN — HEPARIN SODIUM 5000 UNITS: 5000 INJECTION INTRAVENOUS; SUBCUTANEOUS at 19:45

## 2024-06-02 RX ADMIN — PRIMIDONE 50 MG: 50 TABLET ORAL at 08:46

## 2024-06-02 RX ADMIN — SERTRALINE HYDROCHLORIDE 50 MG: 50 TABLET ORAL at 08:45

## 2024-06-02 RX ADMIN — FLUTICASONE PROPIONATE 2 SPRAY: 50 SPRAY, METERED NASAL at 08:46

## 2024-06-02 RX ADMIN — ATORVASTATIN CALCIUM 10 MG: 10 TABLET, FILM COATED ORAL at 08:45

## 2024-06-02 RX ADMIN — POLYETHYLENE GLYCOL 3350 17 G: 17 POWDER, FOR SOLUTION ORAL at 08:46

## 2024-06-02 RX ADMIN — MONTELUKAST SODIUM 10 MG: 10 TABLET ORAL at 08:45

## 2024-06-02 RX ADMIN — HEPARIN SODIUM 5000 UNITS: 5000 INJECTION INTRAVENOUS; SUBCUTANEOUS at 08:46

## 2024-06-02 RX ADMIN — BUPROPION HYDROCHLORIDE 300 MG: 300 TABLET, EXTENDED RELEASE ORAL at 19:45

## 2024-06-02 RX ADMIN — AMLODIPINE BESYLATE 5 MG: 5 TABLET ORAL at 08:45

## 2024-06-02 RX ADMIN — SEVELAMER CARBONATE 800 MG: 800 TABLET, FILM COATED ORAL at 08:45

## 2024-06-02 RX ADMIN — LEVETIRACETAM 500 MG: 500 TABLET, FILM COATED ORAL at 08:45

## 2024-06-02 RX ADMIN — DOCUSATE SODIUM 100 MG: 100 CAPSULE, LIQUID FILLED ORAL at 17:19

## 2024-06-02 RX ADMIN — DOCUSATE SODIUM 100 MG: 100 CAPSULE, LIQUID FILLED ORAL at 19:45

## 2024-06-02 RX ADMIN — DOCUSATE SODIUM 100 MG: 100 CAPSULE, LIQUID FILLED ORAL at 08:45

## 2024-06-02 RX ADMIN — SENNOSIDES 8.6 MG: 8.6 TABLET ORAL at 19:45

## 2024-06-02 RX ADMIN — BUPROPION HYDROCHLORIDE 150 MG: 150 TABLET, EXTENDED RELEASE ORAL at 08:45

## 2024-06-02 RX ADMIN — TRAZODONE HYDROCHLORIDE 50 MG: 50 TABLET ORAL at 19:45

## 2024-06-02 RX ADMIN — MEGESTROL ACETATE 40 MG: 40 TABLET ORAL at 08:45

## 2024-06-02 NOTE — PROGRESS NOTES
[facility]   Nephrology progress note  6/2/2024, 12:07 PM      Pt Name:    Deloris Watts  MRN:     [id number]     YOB: 1955  Admit Date:    5/29/2024  4:40 PM    Chief Complaint: Nephrology following for ESRD on hemodialysis.    Subjective:  Patient seen and examined  Resting comfortably no distress    Objective:  24HR INTAKE/OUTPUT:    Intake/Output Summary (Last 24 hours) at 6/2/2024 1207  Last data filed at 6/1/2024 1950  Gross per 24 hour   Intake 820 ml   Output --   Net 820 ml        Admission weight: 43.5 kg (95 lb 14.4 oz)  Wt Readings from Last 3 Encounters:   06/02/24 39.2 kg (86 lb 6.7 oz)   05/29/24 43.5 kg (95 lb 14.4 oz)   05/13/24 39.9 kg (88 lb)        Vitals :   Vitals:    06/01/24 0915 06/01/24 2036 06/02/24 0310 06/02/24 0830   BP: (!) 140/96 (!) 146/73  (!) 161/82   Pulse: 86 89  94   Resp: 14 16  16   Temp: 98.2 °F (36.8 °C) 98.2 °F (36.8 °C)  97.7 °F (36.5 °C)   TempSrc: Oral Oral  Oral   SpO2: 98% 99%  98%   Weight:   39.2 kg (86 lb 6.7 oz)    Height:           Physical examination  General Appearance:  Well developed. No distress  Mouth/Throat:  Oral mucosa moist  Neck:  Supple, no JVD  Lungs:  Breath sounds: clear  Heart::  S1,S2 heard  Abdomen:  Soft, non - tender  Musculoskeletal:  Edema -no edema    Medications:  Infusion:    sodium chloride       Meds:    montelukast  10 mg Oral Daily    megestrol  40 mg Oral Daily    traZODone  50 mg Oral Nightly    amLODIPine  5 mg Oral Daily    buPROPion  150 mg Oral QAM    buPROPion  300 mg Oral Nightly    docusate sodium  100 mg Oral TID    fluticasone  2 spray Each Nostril Daily    primidone  50 mg Oral Daily    sertraline  50 mg Oral Daily    atorvastatin  10 mg Oral Daily    polyethylene glycol  17 g Oral Daily    sevelamer  800 mg Oral TID     levETIRAcetam  500 mg Oral Daily    senna  1 tablet Oral Nightly    acetaminophen  650 mg Oral Q6H    heparin (porcine)  5,000 Units SubCUTAneous 2 times per day

## 2024-06-02 NOTE — PLAN OF CARE
Problem: Discharge Planning  Goal: Discharge to home or other facility with appropriate resources  6/2/2024 1008 by Lo Carrington RN  Outcome: Progressing  Flowsheets (Taken 6/2/2024 0830)  Discharge to home or other facility with appropriate resources:   Identify barriers to discharge with patient and caregiver   Arrange for needed discharge resources and transportation as appropriate   Identify discharge learning needs (meds, wound care, etc)   Refer to discharge planning if patient needs post-hospital services based on physician order or complex needs related to functional status, cognitive ability or social support system  6/2/2024 0041 by Beverley Hendrickson RN  Outcome: Progressing  Flowsheets (Taken 6/1/2024 2034)  Discharge to home or other facility with appropriate resources: Identify barriers to discharge with patient and caregiver     Problem: Safety - Adult  Goal: Free from fall injury  6/2/2024 1008 by Lo Carrington RN  Outcome: Progressing  6/2/2024 0041 by Beverley Hendrickson RN  Outcome: Progressing     Problem: ABCDS Injury Assessment  Goal: Absence of physical injury  6/2/2024 1008 by Lo Carrington RN  Outcome: Progressing  6/2/2024 0041 by Beverley Hendrickson RN  Outcome: Progressing     Problem: Skin/Tissue Integrity  Goal: Absence of new skin breakdown  Description: 1.  Monitor for areas of redness and/or skin breakdown  2.  Assess vascular access sites hourly  3.  Every 4-6 hours minimum:  Change oxygen saturation probe site  4.  Every 4-6 hours:  If on nasal continuous positive airway pressure, respiratory therapy assess nares and determine need for appliance change or resting period.  6/2/2024 1008 by Lo Carrington RN  Outcome: Progressing  6/2/2024 0041 by Beverley Hendrickson RN  Outcome: Progressing     Problem: Neurosensory - Adult  Goal: Absence of seizures  6/2/2024 1008 by Lo Carrington RN  Outcome: Progressing  Flowsheets (Taken 6/2/2024 0830)  Absence of seizures:   Monitor for

## 2024-06-03 LAB
ANION GAP SERPL CALC-SCNC: 16 MEQ/L (ref 8–16)
BUN SERPL-MCNC: 51 MG/DL (ref 7–22)
CALCIUM SERPL-MCNC: 9.3 MG/DL (ref 8.5–10.5)
CHLORIDE SERPL-SCNC: 103 MEQ/L (ref 98–111)
CO2 SERPL-SCNC: 19 MEQ/L (ref 23–33)
CREAT SERPL-MCNC: 5.4 MG/DL (ref 0.4–1.2)
DEPRECATED RDW RBC AUTO: 57.2 FL (ref 35–45)
ERYTHROCYTE [DISTWIDTH] IN BLOOD BY AUTOMATED COUNT: 14.8 % (ref 11.5–14.5)
GFR SERPL CREATININE-BSD FRML MDRD: 8 ML/MIN/1.73M2
GLUCOSE SERPL-MCNC: 82 MG/DL (ref 70–108)
HCT VFR BLD AUTO: 39.8 % (ref 37–47)
HGB BLD-MCNC: 12.1 GM/DL (ref 12–16)
MAGNESIUM SERPL-MCNC: 2.7 MG/DL (ref 1.6–2.4)
MCH RBC QN AUTO: 31.9 PG (ref 26–33)
MCHC RBC AUTO-ENTMCNC: 30.4 GM/DL (ref 32.2–35.5)
MCV RBC AUTO: 105 FL (ref 81–99)
PHOSPHATE SERPL-MCNC: 5.3 MG/DL (ref 2.4–4.7)
PLATELET # BLD AUTO: 279 THOU/MM3 (ref 130–400)
PMV BLD AUTO: 10.7 FL (ref 9.4–12.4)
POTASSIUM SERPL-SCNC: 4.8 MEQ/L (ref 3.5–5.2)
RBC # BLD AUTO: 3.79 MILL/MM3 (ref 4.2–5.4)
SODIUM SERPL-SCNC: 138 MEQ/L (ref 135–145)
WBC # BLD AUTO: 6.8 THOU/MM3 (ref 4.8–10.8)

## 2024-06-03 PROCEDURE — 97535 SELF CARE MNGMENT TRAINING: CPT

## 2024-06-03 PROCEDURE — 99231 SBSQ HOSP IP/OBS SF/LOW 25: CPT | Performed by: NURSE PRACTITIONER

## 2024-06-03 PROCEDURE — 80048 BASIC METABOLIC PNL TOTAL CA: CPT

## 2024-06-03 PROCEDURE — 6360000002 HC RX W HCPCS: Performed by: PHYSICAL MEDICINE & REHABILITATION

## 2024-06-03 PROCEDURE — 85027 COMPLETE CBC AUTOMATED: CPT

## 2024-06-03 PROCEDURE — 97530 THERAPEUTIC ACTIVITIES: CPT

## 2024-06-03 PROCEDURE — 97110 THERAPEUTIC EXERCISES: CPT

## 2024-06-03 PROCEDURE — 97116 GAIT TRAINING THERAPY: CPT

## 2024-06-03 PROCEDURE — 90935 HEMODIALYSIS ONE EVALUATION: CPT

## 2024-06-03 PROCEDURE — 36415 COLL VENOUS BLD VENIPUNCTURE: CPT

## 2024-06-03 PROCEDURE — 6370000000 HC RX 637 (ALT 250 FOR IP): Performed by: PHYSICAL MEDICINE & REHABILITATION

## 2024-06-03 PROCEDURE — 99232 SBSQ HOSP IP/OBS MODERATE 35: CPT | Performed by: INTERNAL MEDICINE

## 2024-06-03 PROCEDURE — 1180000000 HC REHAB R&B

## 2024-06-03 PROCEDURE — 83735 ASSAY OF MAGNESIUM: CPT

## 2024-06-03 PROCEDURE — 84100 ASSAY OF PHOSPHORUS: CPT

## 2024-06-03 RX ORDER — AMLODIPINE BESYLATE 10 MG/1
10 TABLET ORAL DAILY
Status: DISCONTINUED | OUTPATIENT
Start: 2024-06-04 | End: 2024-06-15 | Stop reason: HOSPADM

## 2024-06-03 RX ADMIN — ACETAMINOPHEN 650 MG: 325 TABLET ORAL at 11:27

## 2024-06-03 RX ADMIN — FLUTICASONE PROPIONATE 2 SPRAY: 50 SPRAY, METERED NASAL at 09:21

## 2024-06-03 RX ADMIN — BUPROPION HYDROCHLORIDE 150 MG: 150 TABLET, EXTENDED RELEASE ORAL at 09:22

## 2024-06-03 RX ADMIN — ACETAMINOPHEN 650 MG: 325 TABLET ORAL at 17:18

## 2024-06-03 RX ADMIN — HEPARIN SODIUM 5000 UNITS: 5000 INJECTION INTRAVENOUS; SUBCUTANEOUS at 21:01

## 2024-06-03 RX ADMIN — ACETAMINOPHEN 650 MG: 325 TABLET ORAL at 05:39

## 2024-06-03 RX ADMIN — LEVETIRACETAM 500 MG: 500 TABLET, FILM COATED ORAL at 09:22

## 2024-06-03 RX ADMIN — BUPROPION HYDROCHLORIDE 300 MG: 300 TABLET, EXTENDED RELEASE ORAL at 21:01

## 2024-06-03 RX ADMIN — PRIMIDONE 50 MG: 50 TABLET ORAL at 09:21

## 2024-06-03 RX ADMIN — DOCUSATE SODIUM 100 MG: 100 CAPSULE, LIQUID FILLED ORAL at 15:58

## 2024-06-03 RX ADMIN — HYDRALAZINE HYDROCHLORIDE 10 MG: 10 TABLET, FILM COATED ORAL at 10:06

## 2024-06-03 RX ADMIN — DOCUSATE SODIUM 100 MG: 100 CAPSULE, LIQUID FILLED ORAL at 09:22

## 2024-06-03 RX ADMIN — SEVELAMER CARBONATE 800 MG: 800 TABLET, FILM COATED ORAL at 09:21

## 2024-06-03 RX ADMIN — HYDRALAZINE HYDROCHLORIDE 10 MG: 10 TABLET, FILM COATED ORAL at 21:01

## 2024-06-03 RX ADMIN — MEGESTROL ACETATE 40 MG: 40 TABLET ORAL at 09:21

## 2024-06-03 RX ADMIN — SENNOSIDES 8.6 MG: 8.6 TABLET ORAL at 21:01

## 2024-06-03 RX ADMIN — AMLODIPINE BESYLATE 5 MG: 5 TABLET ORAL at 09:22

## 2024-06-03 RX ADMIN — DOCUSATE SODIUM 100 MG: 100 CAPSULE, LIQUID FILLED ORAL at 21:01

## 2024-06-03 RX ADMIN — MONTELUKAST SODIUM 10 MG: 10 TABLET ORAL at 09:21

## 2024-06-03 RX ADMIN — HEPARIN SODIUM 5000 UNITS: 5000 INJECTION INTRAVENOUS; SUBCUTANEOUS at 09:22

## 2024-06-03 RX ADMIN — SERTRALINE HYDROCHLORIDE 50 MG: 50 TABLET ORAL at 09:21

## 2024-06-03 RX ADMIN — TRAZODONE HYDROCHLORIDE 50 MG: 50 TABLET ORAL at 21:02

## 2024-06-03 RX ADMIN — SEVELAMER CARBONATE 800 MG: 800 TABLET, FILM COATED ORAL at 11:27

## 2024-06-03 RX ADMIN — ATORVASTATIN CALCIUM 10 MG: 10 TABLET, FILM COATED ORAL at 09:22

## 2024-06-03 RX ADMIN — SEVELAMER CARBONATE 800 MG: 800 TABLET, FILM COATED ORAL at 17:18

## 2024-06-03 RX ADMIN — POLYETHYLENE GLYCOL 3350 17 G: 17 POWDER, FOR SOLUTION ORAL at 09:21

## 2024-06-03 ASSESSMENT — PAIN - FUNCTIONAL ASSESSMENT: PAIN_FUNCTIONAL_ASSESSMENT: ACTIVITIES ARE NOT PREVENTED

## 2024-06-03 ASSESSMENT — PAIN DESCRIPTION - DESCRIPTORS: DESCRIPTORS: ACHING;DISCOMFORT

## 2024-06-03 ASSESSMENT — PAIN DESCRIPTION - LOCATION: LOCATION: HEAD

## 2024-06-03 ASSESSMENT — PAIN SCALES - GENERAL
PAINLEVEL_OUTOF10: 0
PAINLEVEL_OUTOF10: 1

## 2024-06-03 NOTE — PROGRESS NOTES
RECREATIONAL THERAPY  [facility]      Date:  6/3/2024            Patient Name: Deloris Watts           MRN: [id number]  Acct: [id number]          YOB: 1955 (68 y.o.)       Gender: female   Diagnosis: Subdural hematoma  Physician: Referring Practitioner: Dr. ZULEMA Whatley    REASON FOR MISSED TREATMENT:  Pt is sleeping in her recliner-lunch is coming soon and she will be going to dialysis-no RT    Elena Tan, CTRS    6/3/2024

## 2024-06-03 NOTE — PROGRESS NOTES
[facility]   Nephrology progress note  6/3/2024, 11:32 AM      Pt Name:    Deloris Watts  MRN:     [id number]     YOB: 1955  Admit Date:    5/29/2024  4:40 PM    Chief Complaint: Nephrology following for ESRD on hemodialysis.    Subjective:  Patient seen and examined  Resting comfortably no distress  Did not sleep well last night    Objective:  24HR INTAKE/OUTPUT:    Intake/Output Summary (Last 24 hours) at 6/3/2024 1132  Last data filed at 6/3/2024 0558  Gross per 24 hour   Intake 740 ml   Output --   Net 740 ml        Admission weight: 43.5 kg (95 lb 14.4 oz)  Wt Readings from Last 3 Encounters:   06/03/24 39.1 kg (86 lb 3.2 oz)   05/29/24 43.5 kg (95 lb 14.4 oz)   05/13/24 39.9 kg (88 lb)        Vitals :   Vitals:    06/03/24 0235 06/03/24 0920 06/03/24 1000 06/03/24 1115   BP:  (!) 154/77 (!) 170/100 (!) 150/72   Pulse:  91     Resp:  16     Temp:  97.3 °F (36.3 °C)     TempSrc:  Oral     SpO2:  100%     Weight: 39.1 kg (86 lb 3.2 oz)      Height:           Physical examination  General Appearance:  Well developed. No distress  Mouth/Throat:  Oral mucosa moist  Neck:  Supple, no JVD  Lungs:  Breath sounds: clear  Heart::  S1,S2 heard  Abdomen:  Soft, non - tender  Musculoskeletal:  Edema -no edema    Medications:  Infusion:    sodium chloride       Meds:    montelukast  10 mg Oral Daily    megestrol  40 mg Oral Daily    traZODone  50 mg Oral Nightly    amLODIPine  5 mg Oral Daily    buPROPion  150 mg Oral QAM    buPROPion  300 mg Oral Nightly    docusate sodium  100 mg Oral TID    fluticasone  2 spray Each Nostril Daily    primidone  50 mg Oral Daily    sertraline  50 mg Oral Daily    atorvastatin  10 mg Oral Daily    polyethylene glycol  17 g Oral Daily    sevelamer  800 mg Oral TID WC    levETIRAcetam  500 mg Oral Daily    senna  1 tablet Oral Nightly    acetaminophen  650 mg Oral Q6H    heparin (porcine)  5,000 Units SubCUTAneous 2 times per day       Lab Data :  CBC:

## 2024-06-03 NOTE — PLAN OF CARE
Problem: Discharge Planning  Goal: Discharge to home or other facility with appropriate resources  6/3/2024 1325 by Tipton, Rylee, LPN  Outcome: Progressing  Flowsheets (Taken 6/3/2024 1051)  Discharge to home or other facility with appropriate resources:   Identify barriers to discharge with patient and caregiver   Identify discharge learning needs (meds, wound care, etc)     Problem: Safety - Adult  Goal: Free from fall injury  6/3/2024 1325 by Tipton, Rylee, LPN  Outcome: Progressing  Flowsheets (Taken 6/3/2024 1325)  Free From Fall Injury: Instruct family/caregiver on patient safety     Problem: ABCDS Injury Assessment  Goal: Absence of physical injury  6/3/2024 1325 by Tipton, Rylee, LPN  Outcome: Progressing  Flowsheets (Taken 6/3/2024 1325)  Absence of Physical Injury: Implement safety measures based on patient assessment     Problem: Skin/Tissue Integrity  Goal: Absence of new skin breakdown  Description: 1.  Monitor for areas of redness and/or skin breakdown  2.  Assess vascular access sites hourly  3.  Every 4-6 hours minimum:  Change oxygen saturation probe site  4.  Every 4-6 hours:  If on nasal continuous positive airway pressure, respiratory therapy assess nares and determine need for appliance change or resting period.  6/3/2024 1325 by Tipton, Rylee, LPN  Outcome: Progressing     Problem: Neurosensory - Adult  Goal: Absence of seizures  6/3/2024 1325 by Tipton, Rylee, LPN  Outcome: Progressing  Flowsheets (Taken 6/3/2024 1051)  Absence of seizures:   Monitor for seizure activity.  If seizure occurs, document type and location of movements and any associated apnea   If seizure occurs, turn head to side and suction secretions as needed   Administer anticonvulsants as ordered   Support airway/breathing, administer oxygen as needed   Diagnostic studies as ordered     Problem: Skin/Tissue Integrity - Adult  Goal: Skin integrity remains intact  6/3/2024 1325 by Tipton, Rylee, LPN  Outcome:

## 2024-06-03 NOTE — PROGRESS NOTES
[facility]  Inpatient Rehabilitation  Occupational Therapy  Progress Note  Time:  Time In: 0930  Time Out: 1100  Timed Code Treatment Minutes: 90 Minutes  Minutes: 90          Date: 6/3/2024  Patient Name: Deloris Watts,   Gender: female      Room: [facility]12/012-A  MRN: [id number]  : 1955  (68 y.o.)  Referring Practitioner: Dr. ZULEMA Whatley  Diagnosis: Subdural hematoma  Additional Pertinent Hx: Deloris Watts is a 68 y.o.  right-handed slim  female with history of hypertension, fibromyalgia, irritable bowel syndrome, essential tremor, end-stage renal disease on hemodialysis (MWF) since 2023, stroke with left side weakness in , depression, anxiety, right wrist and right ankle fracture treated conservatively, right proximal humeral fracture due to fall requiring ORIF, L2 compression fracture requiring kyphoplasty, LASEK surgery, left femur neck displaced fracture due to fall on 3/10/2024 requiring left hip hemiarthroplasty on 3/11/2024, was admitted on 2024 for intensive inpatient management of impairment & disability secondary to fall resulting acute left parafalcine subdural hematoma. Pt sustained a fall at home and brought to the hospital. CT of the head done on 2024 revealed small acute left parafalcine subdural hematoma.  Neurosurgery was consulted on 2024 for the acute subdural hematoma. A dose of tranexamic acid (TXA) was given.  No acute neurosurgical intervention was advised. Pt admitted to [facility] to regain strength, endurance and balance to improve indep with self care before returning home with spouse.    Restrictions/Precautions:  Restrictions/Precautions: Fall Risk, General Precautions, Seizure, Bed Alarm  Position Activity Restriction  Other position/activity restrictions: History of L hemiarthroplasty 3/11/24.  Perfect serve to Dr. Lezama sent  to clarify if patient continues with hip precautions- Per Dr. Lezama, no YONG

## 2024-06-03 NOTE — FLOWSHEET NOTE
06/03/24 1230 06/03/24 1525   Vital Signs   BP (!) 157/71 (!) 181/89   Temp 98.4 °F (36.9 °C) 98 °F (36.7 °C)   Pulse 90 91   Respirations 18 18   SpO2 98 % 96 %   Weight - Scale 43 kg (94 lb 12.8 oz) 43 kg (94 lb 12.8 oz)   Weight Method Bed scale Bed scale   Percent Weight Change 9.97 0   Post-Hemodialysis Assessment   Post-Treatment Procedures  --  Blood returned;Catheter Capped, clamped with Saline x2 ports   Machine Disinfection Process  --  Acid/Vinegar Clean;Heat Disinfect;Exterior Machine Disinfection   Blood Volume Processed (Liters)  --  50.9 L   Dialyzer Clearance  --  Lightly streaked   Duration of Treatment (minutes)  --  150 minutes   Heparin Amount Administered During Treatment (mL)  --  0 mL   Hemodialysis Intake (ml)  --  400 ml   Hemodialysis Output (ml)  --  400 ml   NET Removed (ml)  --  0     2.5 hour treatment completed. No fluid removed, patient under EDW. Cath lines flushed with 0.9 NS, clamped and tego secured. Report given to primary RN. Charting printed and placed in bin to be scanned into EMR.

## 2024-06-03 NOTE — PROGRESS NOTES
Patient: Delorsi Watts  Unit/Bed: 8K-12/012-A  YOB: 1955  MRN: [id number] Acct: [id number]   Admitting Diagnosis: Subdural hematoma (HCC) [S06.5XAA]  Admit Date:  5/29/2024  Hospital Day: 5    Assessment:     Principal Problem:    Subdural hematoma, acute (HCC)  Active Problems:    Anemia due to chronic kidney disease, on chronic dialysis (HCC)    Depression    Allergic rhinitis    Essential hypertension    Chronic kidney disease with in-center hemodialysis preferred by patient    Essential tremor    Cerebral concussion    Impaired cognition    Severe malnutrition (HCC)  Resolved Problems:    * No resolved hospital problems. *      Plan:     Continue to follow the variable BP        Subjective:     Patient has no complaint of CP or SOB..   Medication side effects: none    Scheduled Meds:   [START ON 6/4/2024] amLODIPine  10 mg Oral Daily    Linaclotide (Linzess) 290 mcg  (Patient Supplied)  290 mcg Oral Daily    montelukast  10 mg Oral Daily    megestrol  40 mg Oral Daily    traZODone  50 mg Oral Nightly    buPROPion  150 mg Oral QAM    buPROPion  300 mg Oral Nightly    docusate sodium  100 mg Oral TID    fluticasone  2 spray Each Nostril Daily    primidone  50 mg Oral Daily    sertraline  50 mg Oral Daily    atorvastatin  10 mg Oral Daily    polyethylene glycol  17 g Oral Daily    sevelamer  800 mg Oral TID WC    levETIRAcetam  500 mg Oral Daily    senna  1 tablet Oral Nightly    acetaminophen  650 mg Oral Q6H    heparin (porcine)  5,000 Units SubCUTAneous 2 times per day     Continuous Infusions:   sodium chloride       PRN Meds:bisacodyl, ondansetron, sodium chloride flush, sodium chloride, acetaminophen, hydrALAZINE, magnesium hydroxide, polyethylene glycol    Review of Systems  Pertinent items are noted in HPI.    Objective:     Patient Vitals for the past 8 hrs:   BP Temp Pulse Resp SpO2 Weight   06/03/24 1600 (!) 141/77 -- 90 18 -- --   06/03/24 1525 (!) 181/89 98 °F (36.7 °C) 91 18 96 % 43

## 2024-06-03 NOTE — PROGRESS NOTES
[facility]  INPATIENT PHYSICAL THERAPY  [facility] - 8K-12/012-A  Progress Note    Time In: 0736  Time Out: 09  Timed Code Treatment Minutes: 90 Minutes  Minutes: 90          Date: 6/3/2024  Patient Name: Deloris Watts,  Gender:  female        MRN: [id number]  : 1955  (68 y.o.)  Referral Date : 24  Referring Practitioner: Jax Whatley MD  Diagnosis: Subdural hematoma, acute (HCC)  Additional Pertinent Hx: Per H&P 24: Deloris Watts is a 68 y.o. right-handed  female with history of hypertension, osteoporosis, end-stage renal disease on hemodialysis (MWF) since 2023, stroke with left side weakness in , anemia, depression, anxiety, left hip hemiarthroplasty on 3/11/2024, and disability secondary to fall resulting acute left parafalcine subdural hematoma. The patient's  says he assisted the patient to sit on her rollator walker seat in living room and then pushing the rollator walker to bring the patient back to bedroom on 2024.  When they were near the bedroom door on the hallway, the patient suddenly slipped off the rollator walker seat and fell down to the floor with her head hitting the carpeted floor of the bedroom entrance.  She did not loss consciousness but felt some headache afterwards. CT of the head done on 2024 revealed small acute left parafalcine subdural hematoma.  Cervical spine CT on 2024 revealed no cervical spine injury.  Neurosurgery was consulted on 2024 for the acute subdural hematoma. A dose of tranexamic acid (TXA) was given.  No acute neurosurgical intervention was advised.  Neurosurgery note stated that Lovenox can be started on 2024 for DVT prophylaxis.  CT of head was repeated later on 2024 and showed stable CT scan of brain without interval change compared to previous study.     Prior Level of Function:  Lives With: Spouse  Type of Home: House  Home Layout: One  completed for increased independence with functional mobility.    Functional Outcome Measures:   Completed.    Tinetti Balance    Sitting Balance: Steady, safe  Arises: Able, uses arms to help  Attempts to Arise: Able to arise, one attempt  Immediate Standing Balance (First 5 Seconds): Steady but uses walker or other support  Standing Balance: Steady but wide stance, uses cane or other support  Nudged: Staggers, grabs, catches self  Eyes Closed: Unsteady  Turned 360 Degrees: Steadiness: Unsteady (grabs, staggers)  Turned 360 Degrees: Continuity of Steps: Discontinuous steps  Sitting Down: Uses arms or not a smooth motion  Balance Score: 8/16 Initiation of Gait: Any hesitancy or multiple attempts to start  Step Height: R Swing Foot: Right foot complete clears floor  Step Length: R Swing Foot: Does not pass left stance foot with step  Step Height: L Swing Foot: Left foot complete clears floor  Step Length: L Swing Foot: Does not pass right stance foot with step  Step Symmetry: Right and left step appear equal  Step Continuity: Stopping or discontinuity between steps  Path: Mild/moderate deviation or uses walking aid  Trunk: Marked sway or uses walking aid  Walking Time: Heels almost touching while walking  Gait Score: 5/12     Current Score: 13 / 28 (Date: 6/3/2024)    Interpretation of Score: Tinetti is  into a gait score and balance score. The higher the patient's score, the more independent/lower fall risk. A total score of 24 or more indicates low fall risk, 19-23 is moderate fall risk, and 18 or less is indicative of high fall risk.   Modified Carolyn Scale:  +4 - Moderately severe disability; unable to walk and attend to bodily needs without assistance.   Patient could not live alone and could not walk from one room to another without physical help from another person, but they can sit up in bed without any help.  Education provided regarding stroke rehabilitation management.    ASSESSMENT:  Assessment:

## 2024-06-03 NOTE — PROGRESS NOTES
hemisphere, as well as mild atrophy, anticipate guarded prognosis regarding cognitive recovery.  politely declining cognitive intervention at this time, with focus for recovery on physical mobility. Please re-consult should  and/or patient wish to pursue cognitive therapy.  Rehabilitation Potential: poor  Discharge Recommendations:  No further skilled ST warranted at this time       Review of Systems:  Constitutional:  Positive for fatigue. Negative for chills, diaphoresis and fever.   HENT:  Negative for hearing loss, rhinorrhea, sneezing, sore throat and trouble swallowing.    Eyes:  Negative for visual disturbance.   Respiratory:  Negative for cough, shortness of breath and wheezing.    Cardiovascular:  Negative for chest pain, palpitations and leg swelling.   Gastrointestinal:  Negative for abdominal pain, constipation, diarrhea, nausea and vomiting.   Genitourinary:  Positive for decreased urine volume. Negative for difficulty urinating and dysuria.   Musculoskeletal:  Positive for gait problem. Negative for arthralgias, back pain, myalgias and neck pain.   Skin:  Negative for rash.   Neurological:  Positive for weakness (Generalized). Negative for dizziness, tremors, seizures, speech difficulty, light-headedness, numbness and headaches.   Psychiatric/Behavioral:  Negative for dysphoric mood, hallucinations and sleep disturbance. The patient is not nervous/anxious.       Objective:  BP (!) 154/77   Pulse 91   Temp 97.3 °F (36.3 °C) (Oral)   Resp 16   Ht 1.524 m (5')   Wt 39.1 kg (86 lb 3.2 oz)   SpO2 100%   BMI 16.83 kg/m²   Patient is awake and alert, sitting up in bed  Pleasantly confused  Orientation: Oriented to person, place, not year or month   Mood: within normal limits  Affect: calm  General appearance: Patient is well nourished, well developed, well groomed and in no acute distress     Memory: Confused, memory impairments within conversation  Attention/Concentration: Some  difficulty following content conversation  Language:  normal     Cranial Nerves:  cranial nerves II-XII are grossly intact  ROM:  normal  Tone:  normal  Muscle bulk: decreased  Sensory:  Sensory intact to light touch  Gait: Not assessed     Heart: Well-perfused extremities, S1 S2 no murmur, regular rhythm   Lungs: Breathing comfortably on room air without increased work of breathing  Abdomen: non-distended, bowels sounds active  Skin: warm and dry, no rash or erythema on exposed skin        Diagnostics:   Recent Results (from the past 24 hour(s))   CBC    Collection Time: 06/03/24  6:21 AM   Result Value Ref Range    WBC 6.8 4.8 - 10.8 thou/mm3    RBC 3.79 (L) 4.20 - 5.40 mill/mm3    Hemoglobin 12.1 12.0 - 16.0 gm/dl    Hematocrit 39.8 37.0 - 47.0 %    .0 (H) 81.0 - 99.0 fL    MCH 31.9 26.0 - 33.0 pg    MCHC 30.4 (L) 32.2 - 35.5 gm/dl    RDW-CV 14.8 (H) 11.5 - 14.5 %    RDW-SD 57.2 (H) 35.0 - 45.0 fL    Platelets 279 130 - 400 thou/mm3    MPV 10.7 9.4 - 12.4 fL           Impression:  Status post fall on 5/25/2024 resulting  Small acute left parafalcine subdural hematoma  Cerebral concussion without loss of consciousness  History of stroke with left hemiparesis  End-stage renal disease requiring hemodialysis  History of fall resulting liver laceration and acute L2 compression fracture requiring kyphoplasty  History of status post left hip hemiarthroplasty for displaced left femur neck fracture due to 3/10/2024 fall  Hypertension  Irritable bowel syndrome  Hyperlipidemia  Osteoporosis  Allergic rhinitis  Essential tremor  GERD  History of hydronephrosis  History of fibromyalgia  History of depression and anxiety  History of right proximal humerus fracture requiring ORIF  History of right wrist fracture requiring casting  History of right ankle fracture requiring casting  Cognitive impairment        Plan:  Continue intensive PT/OT/SLP/RT inpatient rehabilitation program at least 3 hours per day, 5 days per week

## 2024-06-03 NOTE — PLAN OF CARE
Problem: Discharge Planning  Goal: Discharge to home or other facility with appropriate resources  Outcome: Progressing  Flowsheets (Taken 6/2/2024 1945)  Discharge to home or other facility with appropriate resources: Identify barriers to discharge with patient and caregiver     Problem: Safety - Adult  Goal: Free from fall injury  Outcome: Progressing     Problem: ABCDS Injury Assessment  Goal: Absence of physical injury  Outcome: Progressing     Problem: Skin/Tissue Integrity  Goal: Absence of new skin breakdown  Description: 1.  Monitor for areas of redness and/or skin breakdown  2.  Assess vascular access sites hourly  3.  Every 4-6 hours minimum:  Change oxygen saturation probe site  4.  Every 4-6 hours:  If on nasal continuous positive airway pressure, respiratory therapy assess nares and determine need for appliance change or resting period.  Outcome: Progressing     Problem: Neurosensory - Adult  Goal: Absence of seizures  Outcome: Progressing  Flowsheets (Taken 6/2/2024 1945)  Absence of seizures: Monitor for seizure activity.  If seizure occurs, document type and location of movements and any associated apnea

## 2024-06-03 NOTE — CARE COORDINATION
Patient BP was 154/77 during morning rounds. All medications were administered. 20 minutes later, with therapy, patient began to shake. BP taken by therapist and was 182/78. This nurse took a manual BP and got 180/100. PRN Hydralazine administered. Dr. Mosquera notified. /72 1 hour post hydralazine administration. See orders.

## 2024-06-04 LAB
ANION GAP SERPL CALC-SCNC: 11 MEQ/L (ref 8–16)
BUN SERPL-MCNC: 23 MG/DL (ref 7–22)
CALCIUM SERPL-MCNC: 8.8 MG/DL (ref 8.5–10.5)
CHLORIDE SERPL-SCNC: 102 MEQ/L (ref 98–111)
CO2 SERPL-SCNC: 24 MEQ/L (ref 23–33)
CREAT SERPL-MCNC: 3.2 MG/DL (ref 0.4–1.2)
DEPRECATED RDW RBC AUTO: 57.1 FL (ref 35–45)
ERYTHROCYTE [DISTWIDTH] IN BLOOD BY AUTOMATED COUNT: 14.9 % (ref 11.5–14.5)
GFR SERPL CREATININE-BSD FRML MDRD: 15 ML/MIN/1.73M2
GLUCOSE SERPL-MCNC: 74 MG/DL (ref 70–108)
HCT VFR BLD AUTO: 39.6 % (ref 37–47)
HGB BLD-MCNC: 12 GM/DL (ref 12–16)
MAGNESIUM SERPL-MCNC: 2.4 MG/DL (ref 1.6–2.4)
MCH RBC QN AUTO: 31.7 PG (ref 26–33)
MCHC RBC AUTO-ENTMCNC: 30.3 GM/DL (ref 32.2–35.5)
MCV RBC AUTO: 104.5 FL (ref 81–99)
PHOSPHATE SERPL-MCNC: 3.3 MG/DL (ref 2.4–4.7)
PLATELET # BLD AUTO: 206 THOU/MM3 (ref 130–400)
PMV BLD AUTO: 10.5 FL (ref 9.4–12.4)
POTASSIUM SERPL-SCNC: 4.1 MEQ/L (ref 3.5–5.2)
RBC # BLD AUTO: 3.79 MILL/MM3 (ref 4.2–5.4)
SODIUM SERPL-SCNC: 137 MEQ/L (ref 135–145)
WBC # BLD AUTO: 5.3 THOU/MM3 (ref 4.8–10.8)

## 2024-06-04 PROCEDURE — 6370000000 HC RX 637 (ALT 250 FOR IP): Performed by: INTERNAL MEDICINE

## 2024-06-04 PROCEDURE — 6370000000 HC RX 637 (ALT 250 FOR IP): Performed by: PHYSICAL MEDICINE & REHABILITATION

## 2024-06-04 PROCEDURE — 6360000002 HC RX W HCPCS: Performed by: PHYSICAL MEDICINE & REHABILITATION

## 2024-06-04 PROCEDURE — 97535 SELF CARE MNGMENT TRAINING: CPT

## 2024-06-04 PROCEDURE — 97530 THERAPEUTIC ACTIVITIES: CPT

## 2024-06-04 PROCEDURE — 36415 COLL VENOUS BLD VENIPUNCTURE: CPT

## 2024-06-04 PROCEDURE — 83735 ASSAY OF MAGNESIUM: CPT

## 2024-06-04 PROCEDURE — 80048 BASIC METABOLIC PNL TOTAL CA: CPT

## 2024-06-04 PROCEDURE — 99232 SBSQ HOSP IP/OBS MODERATE 35: CPT | Performed by: PHYSICAL MEDICINE & REHABILITATION

## 2024-06-04 PROCEDURE — 99232 SBSQ HOSP IP/OBS MODERATE 35: CPT | Performed by: INTERNAL MEDICINE

## 2024-06-04 PROCEDURE — 97116 GAIT TRAINING THERAPY: CPT

## 2024-06-04 PROCEDURE — 1180000000 HC REHAB R&B

## 2024-06-04 PROCEDURE — 85027 COMPLETE CBC AUTOMATED: CPT

## 2024-06-04 PROCEDURE — 84100 ASSAY OF PHOSPHORUS: CPT

## 2024-06-04 PROCEDURE — 97110 THERAPEUTIC EXERCISES: CPT

## 2024-06-04 RX ORDER — DOCUSATE SODIUM 100 MG/1
100 CAPSULE, LIQUID FILLED ORAL 2 TIMES DAILY
Status: DISCONTINUED | OUTPATIENT
Start: 2024-06-05 | End: 2024-06-15 | Stop reason: HOSPADM

## 2024-06-04 RX ORDER — TRAZODONE HYDROCHLORIDE 100 MG/1
100 TABLET ORAL NIGHTLY
Status: DISCONTINUED | OUTPATIENT
Start: 2024-06-04 | End: 2024-06-15 | Stop reason: HOSPADM

## 2024-06-04 RX ORDER — LOPERAMIDE HYDROCHLORIDE 2 MG/1
2 CAPSULE ORAL 4 TIMES DAILY PRN
Status: DISCONTINUED | OUTPATIENT
Start: 2024-06-04 | End: 2024-06-15 | Stop reason: HOSPADM

## 2024-06-04 RX ADMIN — ACETAMINOPHEN 650 MG: 325 TABLET ORAL at 17:41

## 2024-06-04 RX ADMIN — BUPROPION HYDROCHLORIDE 300 MG: 300 TABLET, EXTENDED RELEASE ORAL at 20:49

## 2024-06-04 RX ADMIN — DOCUSATE SODIUM 100 MG: 100 CAPSULE, LIQUID FILLED ORAL at 08:53

## 2024-06-04 RX ADMIN — LEVETIRACETAM 500 MG: 500 TABLET, FILM COATED ORAL at 08:53

## 2024-06-04 RX ADMIN — POLYETHYLENE GLYCOL 3350 17 G: 17 POWDER, FOR SOLUTION ORAL at 08:53

## 2024-06-04 RX ADMIN — ACETAMINOPHEN 650 MG: 325 TABLET ORAL at 12:45

## 2024-06-04 RX ADMIN — AMLODIPINE BESYLATE 10 MG: 10 TABLET ORAL at 08:53

## 2024-06-04 RX ADMIN — TRAZODONE HYDROCHLORIDE 100 MG: 100 TABLET ORAL at 20:50

## 2024-06-04 RX ADMIN — SEVELAMER CARBONATE 800 MG: 800 TABLET, FILM COATED ORAL at 08:53

## 2024-06-04 RX ADMIN — BUPROPION HYDROCHLORIDE 150 MG: 150 TABLET, EXTENDED RELEASE ORAL at 08:53

## 2024-06-04 RX ADMIN — PRIMIDONE 50 MG: 50 TABLET ORAL at 08:53

## 2024-06-04 RX ADMIN — MEGESTROL ACETATE 40 MG: 40 TABLET ORAL at 08:53

## 2024-06-04 RX ADMIN — HEPARIN SODIUM 5000 UNITS: 5000 INJECTION INTRAVENOUS; SUBCUTANEOUS at 08:53

## 2024-06-04 RX ADMIN — FLUTICASONE PROPIONATE 2 SPRAY: 50 SPRAY, METERED NASAL at 08:53

## 2024-06-04 RX ADMIN — SERTRALINE HYDROCHLORIDE 50 MG: 50 TABLET ORAL at 08:53

## 2024-06-04 RX ADMIN — HEPARIN SODIUM 5000 UNITS: 5000 INJECTION INTRAVENOUS; SUBCUTANEOUS at 20:50

## 2024-06-04 RX ADMIN — MONTELUKAST SODIUM 10 MG: 10 TABLET ORAL at 08:53

## 2024-06-04 RX ADMIN — LOPERAMIDE HYDROCHLORIDE 2 MG: 2 CAPSULE ORAL at 21:48

## 2024-06-04 RX ADMIN — ATORVASTATIN CALCIUM 10 MG: 10 TABLET, FILM COATED ORAL at 08:53

## 2024-06-04 RX ADMIN — SEVELAMER CARBONATE 800 MG: 800 TABLET, FILM COATED ORAL at 12:45

## 2024-06-04 RX ADMIN — ACETAMINOPHEN 650 MG: 325 TABLET ORAL at 06:11

## 2024-06-04 RX ADMIN — SEVELAMER CARBONATE 800 MG: 800 TABLET, FILM COATED ORAL at 17:41

## 2024-06-04 ASSESSMENT — PAIN SCALES - GENERAL
PAINLEVEL_OUTOF10: 0
PAINLEVEL_OUTOF10: 0

## 2024-06-04 NOTE — PROGRESS NOTES
Patient: Deloris Watts  Unit/Bed: 8K-12/012-A  YOB: 1955  MRN: [id number] Acct: [id number]   Admitting Diagnosis: Subdural hematoma (HCC) [S06.5XAA]  Admit Date:  5/29/2024  Hospital Day: 6    Assessment:     Principal Problem:    Subdural hematoma, acute (HCC)  Active Problems:    Anemia due to chronic kidney disease, on chronic dialysis (HCC)    Depression    Allergic rhinitis    Essential hypertension    Chronic kidney disease with in-center hemodialysis preferred by patient    Essential tremor    Cerebral concussion    Impaired cognition    Severe malnutrition (HCC)  Resolved Problems:    * No resolved hospital problems. *      Plan:     Continue to follow        Subjective:     Patient has no complaint of CP or SOB..   Medication side effects: none    Scheduled Meds:   amLODIPine  10 mg Oral Daily    Linaclotide (Linzess) 290 mcg  (Patient Supplied)  290 mcg Oral Daily    montelukast  10 mg Oral Daily    megestrol  40 mg Oral Daily    traZODone  50 mg Oral Nightly    buPROPion  150 mg Oral QAM    buPROPion  300 mg Oral Nightly    docusate sodium  100 mg Oral TID    fluticasone  2 spray Each Nostril Daily    primidone  50 mg Oral Daily    sertraline  50 mg Oral Daily    atorvastatin  10 mg Oral Daily    polyethylene glycol  17 g Oral Daily    sevelamer  800 mg Oral TID WC    levETIRAcetam  500 mg Oral Daily    senna  1 tablet Oral Nightly    acetaminophen  650 mg Oral Q6H    heparin (porcine)  5,000 Units SubCUTAneous 2 times per day     Continuous Infusions:   sodium chloride       PRN Meds:bisacodyl, ondansetron, sodium chloride flush, sodium chloride, acetaminophen, hydrALAZINE, magnesium hydroxide, polyethylene glycol    Review of Systems  Pertinent items are noted in HPI.    Objective:     Patient Vitals for the past 8 hrs:   BP Temp Temp src Pulse Resp SpO2 Weight   06/04/24 0848 (!) 145/72 98.1 °F (36.7 °C) Oral 89 20 100 % --   06/04/24 0514 -- -- -- -- -- -- 45 kg (99 lb 3.3 oz)

## 2024-06-04 NOTE — PROGRESS NOTES
RECREATIONAL THERAPY  [facility]      Date:  6/4/2024            Patient Name: Deloris Watts           MRN: [id number]  Acct: [id number]          YOB: 1955 (68 y.o.)       Gender: female   Diagnosis: Subdural hematoma  Physician: Referring Practitioner: Dr. ZULEMA Whatley    REASON FOR MISSED TREATMENT:  Pt asleep in bed this afternoon     Elena Tan, CTRS    6/4/2024

## 2024-06-04 NOTE — PROGRESS NOTES
[facility]  Diagnosis List for Inpatient Rehab facility (IRF) - Patient Assessment Instrument (DONALD)    Patient Name: Deloris Watts        MRN: [id number]    : 1955  (68 y.o.)  Gender: female     Primary impairment requiring rehabilitation: 2.22 Traumatic, Closed brain injury      Etiologic Diagnosis that led to the condition: Small acute left parafalcine subdural hematoma     Comorbid conditions affecting rehabilitation:  Status post fall on 2024 resulting  Small acute left parafalcine subdural hematoma  Cerebral concussion without loss of consciousness  History of stroke with left hemiparesis  End-stage renal disease requiring hemodialysis  History of fall resulting liver laceration and acute L2 compression fracture requiring kyphoplasty  History of status post left hip hemiarthroplasty for displaced left femur neck fracture due to 3/10/2024 fall  Hypertension  Irritable bowel syndrome  Hyperlipidemia  Osteoporosis  Allergic rhinitis  Essential tremor  GERD  History of hydronephrosis  History of fibromyalgia  History of depression and anxiety  History of right proximal humerus fracture requiring ORIF  History of right wrist fracture requiring casting  History of right ankle fracture requiring casting  Cognitive impairment      ANTON DAVIS MD

## 2024-06-04 NOTE — PROGRESS NOTES
[facility]   Nephrology progress note  6/4/2024, 10:49 AM      Pt Name:    Deloris Watts  MRN:     [id number]     YOB: 1955  Admit Date:    5/29/2024  4:40 PM    Chief Complaint: Nephrology following for ESRD on hemodialysis.    Subjective:  Patient seen and examined  Resting comfortably no distress  Rehab going on well.    Objective:  24HR INTAKE/OUTPUT:    Intake/Output Summary (Last 24 hours) at 6/4/2024 1049  Last data filed at 6/4/2024 0514  Gross per 24 hour   Intake 880 ml   Output 400 ml   Net 480 ml        Admission weight: 43.5 kg (95 lb 14.4 oz)  Wt Readings from Last 3 Encounters:   06/04/24 45 kg (99 lb 3.3 oz)   05/29/24 43.5 kg (95 lb 14.4 oz)   05/13/24 39.9 kg (88 lb)        Vitals :   Vitals:    06/03/24 2057 06/04/24 0000 06/04/24 0514 06/04/24 0848   BP: (!) 166/71 (!) 160/73  (!) 145/72   Pulse: 88   89   Resp: 16   20   Temp: 97.7 °F (36.5 °C)   98.1 °F (36.7 °C)   TempSrc: Oral   Oral   SpO2: 100%   100%   Weight:   45 kg (99 lb 3.3 oz)    Height:           Physical examination  General Appearance:  Well developed. No distress  Mouth/Throat:  Oral mucosa moist  Neck:  Supple, no JVD  Lungs:  Breath sounds: clear  Heart::  S1,S2 heard  Abdomen:  Soft, non - tender  Musculoskeletal:  Edema -no edema    Medications:  Infusion:    sodium chloride       Meds:    amLODIPine  10 mg Oral Daily    Linaclotide (Linzess) 290 mcg  (Patient Supplied)  290 mcg Oral Daily    montelukast  10 mg Oral Daily    megestrol  40 mg Oral Daily    traZODone  50 mg Oral Nightly    buPROPion  150 mg Oral QAM    buPROPion  300 mg Oral Nightly    docusate sodium  100 mg Oral TID    fluticasone  2 spray Each Nostril Daily    primidone  50 mg Oral Daily    sertraline  50 mg Oral Daily    atorvastatin  10 mg Oral Daily    polyethylene glycol  17 g Oral Daily    sevelamer  800 mg Oral TID WC    levETIRAcetam  500 mg Oral Daily    senna  1 tablet Oral Nightly    acetaminophen  650 mg Oral  Q6H    heparin (porcine)  5,000 Units SubCUTAneous 2 times per day       Lab Data :  CBC:   Recent Labs     06/02/24  0614 06/03/24  0621 06/04/24  0620   WBC 7.2 6.8 5.3   HGB 12.5 12.1 12.0   HCT 41.7 39.8 39.6    279 206       CMP:  Recent Labs     06/02/24  0614 06/03/24  0621 06/04/24  0620    138 137   K 4.4 4.8 4.1   CL 99 103 102   CO2 24 19* 24   BUN 36* 51* 23*   CREATININE 4.5* 5.4* 3.2*   GLUCOSE 74 82 74   CALCIUM 8.9 9.3 8.8   MG 2.5* 2.7* 2.4   PHOS 4.1 5.3* 3.3       Hepatic:   No results for input(s): \"LABALBU\", \"AST\", \"ALT\", \"BILITOT\", \"ALKPHOS\" in the last 72 hours.    Invalid input(s): \"ALB\"      Assessment and Plan:  ESRD on HD MWF  Volume status and electrolytes reasonable.  No acute need for HD dialyzed today  Subdural hematoma 2/2 fall  Essential hypertension running better  Hypokalemia doing better  Recurrent falls  Hyperphosphatemia, doing well  Elevated trop  Meds reviewed discussed with the patient     To Cuadra MD  [facility]    This report has been created using voice recognition software. It may contain minor errors which are inherent in voice recognition technology

## 2024-06-04 NOTE — PLAN OF CARE
Problem: Safety - Adult  Goal: Free from fall injury  6/4/2024 0014 by Yessica Fu LPN  Outcome: Progressing  Flowsheets (Taken 6/4/2024 0014)  Free From Fall Injury: Instruct family/caregiver on patient safety     Problem: Neurosensory - Adult  Goal: Absence of seizures  6/4/2024 0014 by Yessica Fu LPN  Outcome: Progressing  Flowsheets (Taken 6/4/2024 0014)  Absence of seizures:   Monitor for seizure activity.  If seizure occurs, document type and location of movements and any associated apnea   If seizure occurs, turn head to side and suction secretions as needed   Administer anticonvulsants as ordered     Problem: Skin/Tissue Integrity - Adult  Goal: Skin integrity remains intact  6/4/2024 0014 by Yessica Fu LPN  Outcome: Progressing  Flowsheets (Taken 6/4/2024 0013)  Skin Integrity Remains Intact: Monitor for areas of redness and/or skin breakdown     Problem: Musculoskeletal - Adult  Goal: Return mobility to safest level of function  6/4/2024 0014 by Yessica Fu LPN  Outcome: Progressing  Flowsheets (Taken 6/4/2024 0014)  Return Mobility to Safest Level of Function: Assess patient stability and activity tolerance for standing, transferring and ambulating with or without assistive devices

## 2024-06-04 NOTE — PROGRESS NOTES
RECREATIONAL THERAPY  [facility]      Date:  6/4/2024            Patient Name: Deloris Watts           MRN: [id number]  Acct: [id number]          YOB: 1955 (68 y.o.)       Gender: female   Diagnosis: Subdural hematoma  Physician: Referring Practitioner: Dr. ZULEMA Whatley    REASON FOR MISSED TREATMENT:  Pt sound asleep in her recliner this am    Elena Tan, CTRS    6/4/2024

## 2024-06-04 NOTE — PROGRESS NOTES
Pt has had a good morning.  She worked well with therapy. No complaints of pain.  This afternoon after lunch working with therapy she started to have very loose watery stools.  She was able to take a nap however after she got up just before supper and has had three more episodes of loose stools with her .  She did not have any bowel medications this afternoon. Her  would like to have her Linzess held for a few days.

## 2024-06-04 NOTE — PLAN OF CARE
Problem: Discharge Planning  Goal: Discharge to home or other facility with appropriate resources  Outcome: Progressing  Flowsheets (Taken 6/4/2024 0848)  Discharge to home or other facility with appropriate resources: Identify barriers to discharge with patient and caregiver     Problem: Safety - Adult  Goal: Free from fall injury  6/4/2024 1123 by Juan Key RN  Outcome: Progressing  Flowsheets (Taken 6/4/2024 1121)  Free From Fall Injury: Instruct family/caregiver on patient safety     Problem: ABCDS Injury Assessment  Goal: Absence of physical injury  Outcome: Progressing  Flowsheets (Taken 6/4/2024 1121)  Absence of Physical Injury: Implement safety measures based on patient assessment     Problem: Skin/Tissue Integrity  Goal: Absence of new skin breakdown  Description: 1.  Monitor for areas of redness and/or skin breakdown  2.  Assess vascular access sites hourly  3.  Every 4-6 hours minimum:  Change oxygen saturation probe site  4.  Every 4-6 hours:  If on nasal continuous positive airway pressure, respiratory therapy assess nares and determine need for appliance change or resting period.  Outcome: Progressing     Problem: Neurosensory - Adult  Goal: Absence of seizures  6/4/2024 1123 by Juan Key RN  Outcome: Progressing  Flowsheets (Taken 6/4/2024 1123)  Absence of seizures: Monitor for seizure activity.  If seizure occurs, document type and location of movements and any associated apnea     Problem: Skin/Tissue Integrity - Adult  Goal: Skin integrity remains intact  6/4/2024 1123 by Juan Key, RN  Outcome: Progressing  Flowsheets (Taken 6/4/2024 0848)  Skin Integrity Remains Intact: Monitor for areas of redness and/or skin breakdown     Problem: Musculoskeletal - Adult  Goal: Return mobility to safest level of function  6/4/2024 1123 by Juan Key, RN  Outcome: Progressing  Flowsheets (Taken 6/4/2024 1123)  Return Mobility to Safest Level of Function: Assess patient stability and

## 2024-06-04 NOTE — PROGRESS NOTES
[facility]  INPATIENT PHYSICAL THERAPY  DAILY NOTE  [facility] - 8K-12/012-A    Time In: 1400  Time Out: 1430  Timed Code Treatment Minutes: 30 Minutes  Minutes: 30          Date: 2024  Patient Name: Deloris Watts,  Gender:  female        MRN: [id number]  : 1955  (68 y.o.)  Referral Date : 24  Referring Practitioner: Jax Whatley MD  Diagnosis: Subdural hematoma, acute (HCC)  Additional Pertinent Hx: Per H&P 24: Deloris Watts is a 68 y.o. right-handed  female with history of hypertension, osteoporosis, end-stage renal disease on hemodialysis (MWF) since 2023, stroke with left side weakness in , anemia, depression, anxiety, left hip hemiarthroplasty on 3/11/2024, and disability secondary to fall resulting acute left parafalcine subdural hematoma. The patient's  says he assisted the patient to sit on her rollator walker seat in living room and then pushing the rollator walker to bring the patient back to bedroom on 2024.  When they were near the bedroom door on the hallway, the patient suddenly slipped off the rollator walker seat and fell down to the floor with her head hitting the carpeted floor of the bedroom entrance.  She did not loss consciousness but felt some headache afterwards. CT of the head done on 2024 revealed small acute left parafalcine subdural hematoma.  Cervical spine CT on 2024 revealed no cervical spine injury.  Neurosurgery was consulted on 2024 for the acute subdural hematoma. A dose of tranexamic acid (TXA) was given.  No acute neurosurgical intervention was advised.  Neurosurgery note stated that Lovenox can be started on 2024 for DVT prophylaxis.  CT of head was repeated later on 2024 and showed stable CT scan of brain without interval change compared to previous study.     Prior Level of Function:  Lives With: Spouse  Type of Home: House  Home Layout: One  with SBA and RW in order to safely transfer for safety in the home  Long Term Goal 3: Pt will ambulate 100' RW achieving heel strike more than 75% of the time with CGA for safety in community  Long Term Goal 4: Pt will ascend/descend ramp with RW and CGA for safe home entry  Long Term Goal 5: Pt to improve Tinetti score to 14 (MDC=4) in order to progress towards reducing fall risk  Additional Goals?: Yes  Long term goal 6: Pt to peform car transfer with RW and CGA in order to go out into her community    Following session, patient left in safe position with all fall risk precautions in place.

## 2024-06-04 NOTE — PROGRESS NOTES
Prayer and encouragement    06/04/24 1644   Encounter Summary   Service Provided For Patient   Referral/Consult From Rounding   Support System Family members   Last Encounter  06/04/24   Complexity of Encounter Low   Begin Time 1500   End Time  1506   Total Time Calculated 6 min   Spiritual/Emotional needs   Type Spiritual Support   Assessment/Intervention/Outcome   Assessment Hopeful   Intervention Empowerment   Outcome Encouraged

## 2024-06-04 NOTE — PROGRESS NOTES
[facility]  Occupational Therapy  Daily Note  Time:   Time In: 0700  Time Out: 0830  Timed Code Treatment Minutes: 90 Minutes  Minutes: 90          Date: 2024  Patient Name: Deloris Watts,   Gender: female      Room: [facility]12/012  MRN: [id number]  : 1955  (68 y.o.)  Referring Practitioner: Dr. ZULEMA Whatley  Diagnosis: Subdural hematoma  Additional Pertinent Hx: Deloris Watts is a 68 y.o.  right-handed slim  female with history of hypertension, fibromyalgia, irritable bowel syndrome, essential tremor, end-stage renal disease on hemodialysis (MWF) since 2023, stroke with left side weakness in , depression, anxiety, right wrist and right ankle fracture treated conservatively, right proximal humeral fracture due to fall requiring ORIF, L2 compression fracture requiring kyphoplasty, LASEK surgery, left femur neck displaced fracture due to fall on 3/10/2024 requiring left hip hemiarthroplasty on 3/11/2024, was admitted on 2024 for intensive inpatient management of impairment & disability secondary to fall resulting acute left parafalcine subdural hematoma. Pt sustained a fall at home and brought to the hospital. CT of the head done on 2024 revealed small acute left parafalcine subdural hematoma.  Neurosurgery was consulted on 2024 for the acute subdural hematoma. A dose of tranexamic acid (TXA) was given.  No acute neurosurgical intervention was advised. Pt admitted to [facility] to regain strength, endurance and balance to improve indep with self care before returning home with spouse.    Restrictions/Precautions:  Restrictions/Precautions: Fall Risk, General Precautions, Seizure, Bed Alarm  Position Activity Restriction  Other position/activity restrictions: History of L hemiarthroplasty 3/11/24.  Perfect serve to Dr. Lezama sent  to clarify if patient continues with hip precautions- Per Dr. Lezama, no YONG precautions.

## 2024-06-04 NOTE — PROGRESS NOTES
level  Home Access: Stairs to enter with rails  Entrance Stairs - Number of Steps: 1 MARIANA.  Pt has a ramp in the garage to enter the house.  pt's spouse uses a transport chair to assist patient in/out of house  Entrance Stairs - Rails: Left  Home Equipment: Lift chair, Walker - 4-Wheeled, Wheelchair - Manual, Walker - Rolling, Reacher, Grab bars (transport wheelchair)   Bathroom Shower/Tub: Tub/Shower unit, Shower chair with back, Curtain  Bathroom Toilet: Standard  Bathroom Equipment: Grab bars in shower, Shower chair  Bathroom Accessibility: Accessible    Receives Help From: Family  ADL Assistance: Needs assistance  Homemaking Assistance: Needs assistance  Ambulation Assistance: Needs assistance  Transfer Assistance: Needs assistance  Active : No  Additional Comments: Pt unable to recall home invormation relaiable, so her spouse provided PLOF. Pt's spouse reports patient was using a rollator at home and has someone walking with her at all times. He states she requires guidance and some light assist with ADLs at home. Over past few weeks, patient required increased physical assist with transfers and mobility. Pt and spouse report patient has someone with her at all times and if spouse leaves, he ensures someone is with her. Pt's spouse completes all IADLs at home.    Restrictions/Precautions:  Restrictions/Precautions: Fall Risk, General Precautions, Seizure, Bed Alarm  Position Activity Restriction  Other position/activity restrictions: History of L hemiarthroplasty 3/11/24.  Perfect serve to Dr. Lezama sent 5/30 to clarify if patient continues with hip precautions- Per Dr. Lezama, no YONG precautions.     SUBJECTIVE: Pt. Seated on her BS chair resting upon arrival, pleasantly agrees to therapy session.  Noted more tremors and difficulty with ST at the start of session, improvement once waking up more. Requesting to use the BR at the start and end of session. Spouse in room to assist following treatment.  ADLs  Short Term Goals  Time Frame for Short Term Goals: 1 wk from evaluation  Short Term Goal 1: Pt will perform sup<>sit in bed with HOB flat and 1 rail with mod A in order to exit bed  Short Term Goal 2: Pt will sit to stand without posterior trunk lean and safe hand placement with CGA and RW in order to safely transfer from various surfaces  Short Term Goal 3: Pt will ambulate 50' with RW and CGA for improved functional mobility  Short Term Goal 4: Pt to perform ramp ambulation with RW and mod A  for progressing to home entry  Long Term Goals  Time Frame for Long Term Goals : 3 weeks from evaluation  Long Term Goal 1: Pt will perform sup<> sit in bed with HOB flat and no rail with min A for ease of getting out bed  Long Term Goal 2: Pt will sit<>stand without posterior trunk lean and safe hand placement with SBA and RW in order to safely transfer for safety in the home  Long Term Goal 3: Pt will ambulate 100' RW achieving heel strike more than 75% of the time with CGA for safety in community  Long Term Goal 4: Pt will ascend/descend ramp with RW and CGA for safe home entry  Long Term Goal 5: Pt to improve Tinetti score to 14 (MDC=4) in order to progress towards reducing fall risk  Additional Goals?: Yes  Long term goal 6: Pt to peform car transfer with RW and CGA in order to go out into her community    Following session, patient left in safe position with all fall risk precautions in place.

## 2024-06-04 NOTE — PLAN OF CARE
Problem: Discharge Planning  Goal: Discharge to home or other facility with appropriate resources  6/4/2024 1251 by Kusum Guajardo LISW  Note: Team conference held Tuesday, 6/4. Recommendations of the team were explained to the patient and her , Tenzin by Dr Whatley and SW. Team is recommending that patient continue on acute inpatient rehab for PT and OT, with expected discharge date of Saturday, 6/15. Following discharge, team is recommending home health services for RN, PT, OT and HHA. Tenzin reports wanting to use [facility] at discharge. Care plan reviewed with patient and Tenzin. Patient and Tenzin verbalized understanding of the plan of care and contributed to goal setting. SW to follow and maintain involvement in discharge planning.

## 2024-06-05 PROCEDURE — 97116 GAIT TRAINING THERAPY: CPT

## 2024-06-05 PROCEDURE — 6370000000 HC RX 637 (ALT 250 FOR IP): Performed by: PHYSICAL MEDICINE & REHABILITATION

## 2024-06-05 PROCEDURE — 6370000000 HC RX 637 (ALT 250 FOR IP): Performed by: INTERNAL MEDICINE

## 2024-06-05 PROCEDURE — 99232 SBSQ HOSP IP/OBS MODERATE 35: CPT | Performed by: INTERNAL MEDICINE

## 2024-06-05 PROCEDURE — 99232 SBSQ HOSP IP/OBS MODERATE 35: CPT | Performed by: PHYSICAL MEDICINE & REHABILITATION

## 2024-06-05 PROCEDURE — 1180000000 HC REHAB R&B

## 2024-06-05 PROCEDURE — 97530 THERAPEUTIC ACTIVITIES: CPT

## 2024-06-05 PROCEDURE — 6360000002 HC RX W HCPCS: Performed by: PHYSICAL MEDICINE & REHABILITATION

## 2024-06-05 PROCEDURE — 97110 THERAPEUTIC EXERCISES: CPT

## 2024-06-05 PROCEDURE — 90935 HEMODIALYSIS ONE EVALUATION: CPT

## 2024-06-05 PROCEDURE — 97535 SELF CARE MNGMENT TRAINING: CPT

## 2024-06-05 RX ADMIN — MEGESTROL ACETATE 40 MG: 40 TABLET ORAL at 10:39

## 2024-06-05 RX ADMIN — ACETAMINOPHEN 650 MG: 325 TABLET ORAL at 06:08

## 2024-06-05 RX ADMIN — BUPROPION HYDROCHLORIDE 300 MG: 300 TABLET, EXTENDED RELEASE ORAL at 20:40

## 2024-06-05 RX ADMIN — HEPARIN SODIUM 5000 UNITS: 5000 INJECTION INTRAVENOUS; SUBCUTANEOUS at 20:40

## 2024-06-05 RX ADMIN — ACETAMINOPHEN 650 MG: 325 TABLET ORAL at 18:21

## 2024-06-05 RX ADMIN — LEVETIRACETAM 500 MG: 500 TABLET, FILM COATED ORAL at 10:39

## 2024-06-05 RX ADMIN — AMLODIPINE BESYLATE 10 MG: 10 TABLET ORAL at 10:39

## 2024-06-05 RX ADMIN — SEVELAMER CARBONATE 800 MG: 800 TABLET, FILM COATED ORAL at 10:39

## 2024-06-05 RX ADMIN — TRAZODONE HYDROCHLORIDE 100 MG: 100 TABLET ORAL at 20:40

## 2024-06-05 RX ADMIN — SEVELAMER CARBONATE 800 MG: 800 TABLET, FILM COATED ORAL at 18:20

## 2024-06-05 RX ADMIN — PRIMIDONE 50 MG: 50 TABLET ORAL at 10:39

## 2024-06-05 RX ADMIN — SERTRALINE HYDROCHLORIDE 50 MG: 50 TABLET ORAL at 10:39

## 2024-06-05 RX ADMIN — BUPROPION HYDROCHLORIDE 150 MG: 150 TABLET, EXTENDED RELEASE ORAL at 10:39

## 2024-06-05 RX ADMIN — HEPARIN SODIUM 5000 UNITS: 5000 INJECTION INTRAVENOUS; SUBCUTANEOUS at 10:40

## 2024-06-05 ASSESSMENT — PAIN SCALES - GENERAL
PAINLEVEL_OUTOF10: 0
PAINLEVEL_OUTOF10: 0

## 2024-06-05 NOTE — PROGRESS NOTES
[facility]  Occupational Therapy  Daily Note  Time:   Time In: 0900  Time Out: 1030  Timed Code Treatment Minutes: 90 Minutes  Minutes: 90          Date: 2024  Patient Name: Deloris Watts,   Gender: female      Room: [facility]12/012  MRN: [id number]  : 1955  (68 y.o.)  Referring Practitioner: Dr. ZULEMA Whatley  Diagnosis: Subdural hematoma  Additional Pertinent Hx: Deloris Watts is a 68 y.o.  right-handed slim  female with history of hypertension, fibromyalgia, irritable bowel syndrome, essential tremor, end-stage renal disease on hemodialysis (MWF) since 2023, stroke with left side weakness in , depression, anxiety, right wrist and right ankle fracture treated conservatively, right proximal humeral fracture due to fall requiring ORIF, L2 compression fracture requiring kyphoplasty, LASEK surgery, left femur neck displaced fracture due to fall on 3/10/2024 requiring left hip hemiarthroplasty on 3/11/2024, was admitted on 2024 for intensive inpatient management of impairment & disability secondary to fall resulting acute left parafalcine subdural hematoma. Pt sustained a fall at home and brought to the hospital. CT of the head done on 2024 revealed small acute left parafalcine subdural hematoma.  Neurosurgery was consulted on 2024 for the acute subdural hematoma. A dose of tranexamic acid (TXA) was given.  No acute neurosurgical intervention was advised. Pt admitted to [facility] [place] to regain strength, endurance and balance to improve indep with self care before returning home with spouse.    Restrictions/Precautions:  Restrictions/Precautions: Fall Risk, General Precautions, Seizure, Bed Alarm  Position Activity Restriction  Other position/activity restrictions: History of L hemiarthroplasty 3/11/24.  Perfect serve to Dr. Lezama sent  to clarify if patient continues with hip precautions- Per Dr. Lezama, no YONG precautions.

## 2024-06-05 NOTE — PROGRESS NOTES
Physical Medicine & Rehabilitation Progress Note    Chief Complaint:  Fall resulting subdural hematoma     Subjective:    Deloris Watts is a 68 y.o.  right-handed slim  female with history of hypertension, hyperlipidemia, hydronephrosis, fibromyalgia, irritable bowel syndrome, essential tremor, osteoporosis, end-stage renal disease on hemodialysis (MWF) since January 2023, stroke with left side weakness in 1990s, anemia, depression, anxiety, right wrist and right ankle fracture treated conservatively, right proximal humeral fracture due to fall requiring ORIF, status post right carpal tunnel release surgery, status post cervical spine surgery, status post hysterectomy and bilateral oophorectomy, hemorrhoidectomy, sinus surgery, tubal ligation, L2 compression fracture requiring kyphoplasty, LASEK surgery, left femur neck displaced fracture due to fall on 3/10/2024 requiring left hip hemiarthroplasty on 3/11/2024, was admitted on 5/29/2024 for intensive inpatient management of impairment & disability secondary to fall resulting acute left parafalcine subdural hematoma.     The patient is known to inpatient rehab service.  She was previously in our inpatient rehab service from 12/21/2023 to 1/5/2024 after a fall accident on 12/14/2023 resulting L2 compression fracture requiring kyphoplasty, and liver laceration.  She was discharged to home on 1/5/2024 with referral for outpatient PT, OT and speech therapy.  She again sustained a fall accident at home bathroom on 3/10/2024 resulting left femur neck displaced fracture requiring left hip hemiarthroplasty done on 3/11/2024.  She then was admitted to inpatient rehab service again on 3/14/2024.  She was discharged home on 3/30/2024 with referral for outpatient PT and OT treatment after discharge.     The patient's  says he assisted the patient to sit on her rollator walker seat in living room and then pushing the rollator walker to bring the patient back  kyphoplasty  History of status post left hip hemiarthroplasty for displaced left femur neck fracture due to 3/10/2024 fall  Hypertension  Irritable bowel syndrome  Hyperlipidemia  Osteoporosis  Allergic rhinitis  Essential tremor  GERD  History of hydronephrosis  History of fibromyalgia  History of depression and anxiety  History of right proximal humerus fracture requiring ORIF  History of right wrist fracture requiring casting  History of right ankle fracture requiring casting  Cognitive impairment      The patient's condition is stable.  She feels better today and denied feeling fatigue, tired or weak.  She had hemodialysis yesterday and tolerated well.  Previous diarrhea had stopped.  She still has mild weakness at all 4 extremities with manual muscle testing.  She continues to have mild tremor with limb movement.  She tolerated yesterday's rehabilitation treatment well.  Her function continue to improve very slowly.  Yesterday also discussed with patient and her  in regarding the goal of care and I suggest of palliative care consultation.  Both patient and her  agree with seeing palliative care service.    The patient currently is projected to be ready for discharge on 6/15/2024.      Plan:  Continue intensive PT/OT/SLP/RT inpatient rehabilitation program at least 3 hours per day, 5 days per week in order to improve functional status prior to discharge.  Family education and training will be completed.  Equipment evaluations and recommendations will be completed as appropriate.       Rehabilitation nursing continue to be involved for bowel, bladder, skin, and pain management.  Nursing will also provide education and training to patient and family.    Prophylaxis:  DVT: Lovenox, ACE stocking, intermittent pneumatic compression device.  GI: Colace, Senokot, Dulcolax suppository as needed, GlycoLax as needed, milk of magnesia as needed, Zofran as needed  Pain: Tylenol, Tylenol as needed  Continue

## 2024-06-05 NOTE — PLAN OF CARE
Problem: Discharge Planning  Goal: Discharge to home or other facility with appropriate resources  6/5/2024 1337 by Tipton, Rylee, LPN  Outcome: Progressing  Flowsheets (Taken 6/5/2024 1331)  Discharge to home or other facility with appropriate resources:   Identify barriers to discharge with patient and caregiver   Identify discharge learning needs (meds, wound care, etc)     Problem: Safety - Adult  Goal: Free from fall injury  6/5/2024 1337 by Tipton, Rylee, LPN  Outcome: Progressing  Flowsheets (Taken 6/5/2024 1337)  Free From Fall Injury: Instruct family/caregiver on patient safety     Problem: ABCDS Injury Assessment  Goal: Absence of physical injury  6/5/2024 1337 by Tipton, Rylee, LPN  Outcome: Progressing  Flowsheets (Taken 6/5/2024 1337)  Absence of Physical Injury: Implement safety measures based on patient assessment     Problem: Skin/Tissue Integrity  Goal: Absence of new skin breakdown  Description: 1.  Monitor for areas of redness and/or skin breakdown  2.  Assess vascular access sites hourly  3.  Every 4-6 hours minimum:  Change oxygen saturation probe site  4.  Every 4-6 hours:  If on nasal continuous positive airway pressure, respiratory therapy assess nares and determine need for appliance change or resting period.  6/5/2024 1337 by Tipton, Rylee, LPN  Outcome: Progressing     Problem: Neurosensory - Adult  Goal: Absence of seizures  6/5/2024 1337 by Tipton, Rylee, LPN  Outcome: Progressing  Flowsheets (Taken 6/5/2024 1331)  Absence of seizures:   Administer anticonvulsants as ordered   Monitor for seizure activity.  If seizure occurs, document type and location of movements and any associated apnea   If seizure occurs, turn head to side and suction secretions as needed   Diagnostic studies as ordered   Support airway/breathing, administer oxygen as needed     Problem: Skin/Tissue Integrity - Adult  Goal: Skin integrity remains intact  6/5/2024 1337 by Tipton, Rylee, LPN  Outcome:  Progressing  Flowsheets (Taken 6/5/2024 1331)  Skin Integrity Remains Intact:   Assess vascular access sites hourly   Monitor for areas of redness and/or skin breakdown     Problem: Musculoskeletal - Adult  Goal: Return mobility to safest level of function  6/5/2024 1337 by Tipton, Rylee, LPN  Outcome: Progressing  Flowsheets (Taken 6/5/2024 1331)  Return Mobility to Safest Level of Function:   Assess patient stability and activity tolerance for standing, transferring and ambulating with or without assistive devices   Assist with transfers and ambulation using safe patient handling equipment as needed   Ensure adequate protection for wounds/incisions during mobilization   Apply continuous passive motion per provider or physical therapy orders to increase flexion toward goal   Instruct patient/family in ordered activity level     Problem: Gastrointestinal - Adult  Goal: Maintains or returns to baseline bowel function  6/5/2024 1337 by Tipton, Rylee, LPN  Outcome: Progressing  Flowsheets (Taken 6/5/2024 1331)  Maintains or returns to baseline bowel function:   Assess bowel function   Encourage oral fluids to ensure adequate hydration   Administer ordered medications as needed   Encourage mobilization and activity     Problem: Genitourinary - Adult  Goal: Absence of urinary retention  6/5/2024 1337 by Tipton, Rylee, LPN  Outcome: Progressing  Flowsheets (Taken 6/5/2024 1331)  Absence of urinary retention:   Assess patient’s ability to void and empty bladder   Monitor intake/output and perform bladder scan as needed     Problem: Infection - Adult  Goal: Absence of infection at discharge  6/5/2024 1337 by Tipton, Rylee, LPN  Outcome: Progressing  Flowsheets (Taken 6/5/2024 1331)  Absence of infection at discharge:   Assess and monitor for signs and symptoms of infection   Monitor lab/diagnostic results   Monitor all insertion sites i.e., indwelling lines, tubes and drains   Instruct and encourage patient and family to

## 2024-06-05 NOTE — PLAN OF CARE
Problem: Safety - Adult  Goal: Free from fall injury  6/5/2024 0005 by Virgilio Burns, RN  Outcome: Progressing  Note: Hourly rounds in place and call light button within reach     Problem: ABCDS Injury Assessment  Goal: Absence of physical injury  6/5/2024 0005 by Virgilio Burns, RN  Outcome: Progressing     Problem: Skin/Tissue Integrity  Goal: Absence of new skin breakdown  Description: 1.  Monitor for areas of redness and/or skin breakdown  2.  Assess vascular access sites hourly  3.  Every 4-6 hours minimum:  Change oxygen saturation probe site  4.  Every 4-6 hours:  If on nasal continuous positive airway pressure, respiratory therapy assess nares and determine need for appliance change or resting period.  6/5/2024 0005 by Virgilio Burns, RN  Outcome: Progressing     Problem: Gastrointestinal - Adult  Goal: Maintains or returns to baseline bowel function  6/5/2024 0005 by Virgilio Burns, RN  Outcome: Progressing  Flowsheets (Taken 6/4/2024 2040)  Maintains or returns to baseline bowel function: Assess bowel function  Note: Provided pharmacological interventions. Held and modified other medications by AP.     Care plan reviewed with patient.  Patient verbalizes understanding of the plan of care and contribute to goal setting.

## 2024-06-05 NOTE — PROGRESS NOTES
[facility]  INPATIENT PHYSICAL THERAPY  [facility] - 8K-12/012-A  Daily Note    Time In: 0745  Time Out: 0900  Timed Code Treatment Minutes: 75 Minutes  Minutes: 75     Minute Variance  Variance: -15  Reason:  (late breakfast, required extra time to eat)    Date: 2024  Patient Name: Deloris Watts,  Gender:  female        MRN: [id number]  : 1955  (68 y.o.)  Referral Date : 24  Referring Practitioner: Jax Whatley MD  Diagnosis: Subdural hematoma, acute (HCC)  Additional Pertinent Hx: Per H&P 24: Deloris Watts is a 68 y.o. right-handed  female with history of hypertension, osteoporosis, end-stage renal disease on hemodialysis (MWF) since 2023, stroke with left side weakness in , anemia, depression, anxiety, left hip hemiarthroplasty on 3/11/2024, and disability secondary to fall resulting acute left parafalcine subdural hematoma. The patient's  says he assisted the patient to sit on her rollator walker seat in living room and then pushing the rollator walker to bring the patient back to bedroom on 2024.  When they were near the bedroom door on the hallway, the patient suddenly slipped off the rollator walker seat and fell down to the floor with her head hitting the carpeted floor of the bedroom entrance.  She did not loss consciousness but felt some headache afterwards. CT of the head done on 2024 revealed small acute left parafalcine subdural hematoma.  Cervical spine CT on 2024 revealed no cervical spine injury.  Neurosurgery was consulted on 2024 for the acute subdural hematoma. A dose of tranexamic acid (TXA) was given.  No acute neurosurgical intervention was advised.  Neurosurgery note stated that Lovenox can be started on 2024 for DVT prophylaxis.  CT of head was repeated later on 2024 and showed stable CT scan of brain without interval change compared to previous study.  raises, Long arc quads, Seated isometric hip adduction, and Seated abduction/adduction.  Exercises were completed for increased independence with functional mobility.    Functional Outcome Measures:   Not completed  Modified Newcastle Scale:  +4 - Moderately severe disability; unable to walk and attend to bodily needs without assistance.   Patient could not live alone and could not walk from one room to another without physical help from another person, but they can sit up in bed without any help.  Education provided regarding stroke rehabilitation management.     ASSESSMENT:  Assessment: Patient progressing toward established goals.  Activity Tolerance:  Patient tolerance of  treatment: good.     Equipment Recommendations:Equipment Needed: No  Discharge Recommendations: Continue to assess pending progress, 24 hour supervision or assist, Patient would benefit from continued therapy after discharge     PLAN: Current Treatment Recommendations: Strengthening, Balance training, Endurance training, Functional mobility training, Transfer training, Gait training, Home exercise program, Equipment evaluation, education, & procurement, Therapeutic activities, Patient/Caregiver education & training, Safety education & training, Cognitive reorientation, Neuromuscular re-education, Wheelchair mobility training, ROM  General Plan:  (5x/wk for 90 minutes)    Patient Education  Patient Education: Plan of Care, Functional Mobility, Reviewed Prior Education, Health Promotion and Wellness Education, Safety, Verbal Exercise Instruction, Bed Mobility, Transfers, Gait, Stairs, Home Safety Education, - Patient Requires Continued Education    Goals:  Patient Goals : per family, improve endurance, transfers,walking and performing ADLs  Short Term Goals  Time Frame for Short Term Goals: 1 wk from evaluation  Short Term Goal 1: Pt will perform sup<>sit in bed with HOB flat and 1 rail with mod A in order to exit bed  Short Term Goal 2: Pt will

## 2024-06-05 NOTE — PROGRESS NOTES
Patient: Deloris Watts  Unit/Bed: 8K-12/012-A  YOB: 1955  MRN: [id number] Acct: [id number]   Admitting Diagnosis: Subdural hematoma (HCC) [S06.5XAA]  Admit Date:  5/29/2024  Hospital Day: 7    Assessment:     Principal Problem:    Subdural hematoma, acute (HCC)  Active Problems:    Anemia due to chronic kidney disease, on chronic dialysis (HCC)    Depression    Allergic rhinitis    Essential hypertension    Chronic kidney disease with in-center hemodialysis preferred by patient    Essential tremor    Cerebral concussion    Impaired cognition    Severe malnutrition (HCC)  Resolved Problems:    * No resolved hospital problems. *      Plan:     Continue to follow        Subjective:     Patient has no complaint of CP or SOB..   Medication side effects: none    Scheduled Meds:   traZODone  100 mg Oral Nightly    docusate sodium  100 mg Oral BID    amLODIPine  10 mg Oral Daily    megestrol  40 mg Oral Daily    buPROPion  150 mg Oral QAM    buPROPion  300 mg Oral Nightly    primidone  50 mg Oral Daily    sertraline  50 mg Oral Daily    sevelamer  800 mg Oral TID WC    levETIRAcetam  500 mg Oral Daily    senna  1 tablet Oral Nightly    acetaminophen  650 mg Oral Q6H    heparin (porcine)  5,000 Units SubCUTAneous 2 times per day     Continuous Infusions:   sodium chloride       PRN Meds:loperamide, linaclotide, bisacodyl, ondansetron, sodium chloride flush, sodium chloride, acetaminophen, hydrALAZINE, magnesium hydroxide, polyethylene glycol    Review of Systems  Pertinent items are noted in HPI.    Objective:     Patient Vitals for the past 8 hrs:   BP Temp Pulse SpO2 Weight   06/05/24 1038 (!) 138/58 98.1 °F (36.7 °C) 90 100 % --   06/05/24 0600 -- -- -- -- 44.8 kg (98 lb 12.3 oz)     I/O last 3 completed shifts:  In: 1440 [P.O.:1440]  Out: -   No intake/output data recorded.    BP (!) 138/58   Pulse 90   Temp 98.1 °F (36.7 °C)   Resp 18   Ht 1.524 m (5')   Wt 44.8 kg (98 lb 12.3 oz)   SpO2 100%

## 2024-06-05 NOTE — FLOWSHEET NOTE
06/04/24 2114   Treatment Team Notification   Reason for Communication Evaluate  (Deloris has been have loose watery stool all afternoon. She has had large amounts four times. The  thinks it is due to her Linzess. Can we have something to help her stop going?)   Name of Team Member Notified Dr. Whatley   Treatment Team Role Attending Provider   Method of Communication Secure Message   Response See orders  (Immodium PRN QID, Colace changed to BID, Linzess oral daily PRN)   Notification Time 2114

## 2024-06-05 NOTE — CARE COORDINATION
Patient had loose bowel in the afternoon and had abdominal cramps after shift change. Imodium was given and held her BM meds. Patient was satisfied and relieved. Vital signs were in normal limits and no discomfort or pain reported by the patient.

## 2024-06-05 NOTE — PROGRESS NOTES
[facility]   Nephrology progress note  6/5/2024, 11:36 AM      Pt Name:    Deloris Watts  MRN:     [id number]     YOB: 1955  Admit Date:    5/29/2024  4:40 PM    Chief Complaint: Nephrology following for ESRD on hemodialysis.    Subjective:  Patient seen and examined  Resting comfortably no distress  Rehab going on well.   at bedside    Objective:  24HR INTAKE/OUTPUT:    Intake/Output Summary (Last 24 hours) at 6/5/2024 1136  Last data filed at 6/5/2024 1000  Gross per 24 hour   Intake 1220 ml   Output --   Net 1220 ml        Admission weight: 43.5 kg (95 lb 14.4 oz)  Wt Readings from Last 3 Encounters:   06/05/24 44.8 kg (98 lb 12.3 oz)   05/29/24 43.5 kg (95 lb 14.4 oz)   05/13/24 39.9 kg (88 lb)        Vitals :   Vitals:    06/04/24 1103 06/04/24 2045 06/05/24 0600 06/05/24 1038   BP: (!) 157/69 (!) 144/71  (!) 138/58   Pulse: 90 96  90   Resp:  18     Temp:  98.2 °F (36.8 °C)  98.1 °F (36.7 °C)   TempSrc:  Oral     SpO2: 97% 99%  100%   Weight:   44.8 kg (98 lb 12.3 oz)    Height:           Physical examination  General Appearance:  Well developed. No distress  Mouth/Throat:  Oral mucosa moist  Neck:  Supple, no JVD  Lungs:  Breath sounds: clear  Heart::  S1,S2 heard  Abdomen:  Soft, non - tender  Musculoskeletal:  Edema -no edema    Medications:  Infusion:    sodium chloride       Meds:    traZODone  100 mg Oral Nightly    docusate sodium  100 mg Oral BID    amLODIPine  10 mg Oral Daily    megestrol  40 mg Oral Daily    buPROPion  150 mg Oral QAM    buPROPion  300 mg Oral Nightly    primidone  50 mg Oral Daily    sertraline  50 mg Oral Daily    sevelamer  800 mg Oral TID WC    levETIRAcetam  500 mg Oral Daily    senna  1 tablet Oral Nightly    acetaminophen  650 mg Oral Q6H    heparin (porcine)  5,000 Units SubCUTAneous 2 times per day       Lab Data :  CBC:   Recent Labs     06/03/24  0621 06/04/24  0620   WBC 6.8 5.3   HGB 12.1 12.0   HCT 39.8 39.6    206

## 2024-06-05 NOTE — PROGRESS NOTES
[facility]  Recreational Therapy  Daily Note  [facility]    Time Spent with Patient: 0 minutes    Date:  6/5/2024       Patient Name: Deloris Watts      MRN: [id number]      YOB: 1955 (68 y.o.)       Gender: female  Diagnosis: Subdural hematoma  Referring Practitioner: Dr. ZULEMA Whatley    RESTRICTIONS/PRECAUTIONS:  Restrictions/Precautions: Fall Risk, General Precautions, Seizure, Bed Alarm     Hearing: Within functional limits    PAIN: 0    SUBJECTIVE:  I would like that     OBJECTIVE:  Pt would like to get her hair washed and styled by our beautician tomorrow-affect brightened-pleasant          Patient Education  New Education Provided: Importance of Leisure,     Electronically signed by: Elena Tan, CTRS  Date: 6/5/2024

## 2024-06-05 NOTE — PROGRESS NOTES
Comprehensive Nutrition Assessment    Type and Reason for Visit:  Reassess    Nutrition Recommendations/Plan:   Recommend continue regular diet (d/t ongoing poor po intake).  Continue Magic Cups ONS TID; discontinue Nepro once/day as pt. Not drinking.  Recommend renal MVI.  Continue Megace for appetite stimulation - ? If pt. Would benefit from increased dose.  Rx per MD to assist w/ BMs.  Encouraged po, good nutrition at best efforts for healing.  Encouraged food from home/outside as desired.       Malnutrition Assessment:  Malnutrition Status:  Severe malnutrition (05/30/24 1355)    Context:  Chronic Illness     Findings of the 6 clinical characteristics of malnutrition:  Energy Intake:  75% or less estimated energy requirements for 1 month or longer  Weight Loss:   (-10.6% in 1.5 months (no edema either weight))     Body Fat Loss:  Severe body fat loss Orbital, Triceps, Buccal region   Muscle Mass Loss:   (moderate) Temples (temporalis)  Fluid Accumulation:  Unable to assess (HD)     Strength:  Not Performed    Nutrition Assessment:     Pt. severely malnourished AEB criteria listed above.  At risk for further nutritional compromise r/t admit w/ subdural hematoma s/p fall, increased nutrient needs d/t known losses w/ dialysis and underlying medical condition (hx CKD, CHF, CVA, depression, HTN, HLD, ESRD-HD).       Nutrition Related Findings:      Wound Type: None     Pt. Report/Treatments/Miscellaneous:   6/5: pt. Seen w/ ; states good appetite today; consuming less than 75% of meals; diarrhea reported 6/4 - medications adjusted - pt. Denies any diarrhea today; states acceptance of Magic Cups but is not drinking the Nepro - would like discontinued; spouse requesting pt. Receive the same breakfast daily as they seem to miss the nutrition ambassador taking the breakfast order - notified nutrition staff  5/30: pt. Seen - reports appetite is \"ok\"; denies any trouble tolerating diet; has only taken a few  inpatient       Goals:  Previous Goal Met: Progressing toward Goal(s)  Goals: PO intake 75% or greater, by next RD assessment       Nutrition Monitoring and Evaluation:      Food/Nutrient Intake Outcomes: Diet Advancement/Tolerance, Food and Nutrient Intake, Supplement Intake  Physical Signs/Symptoms Outcomes: Biochemical Data, Chewing or Swallowing, GI Status, Fluid Status or Edema, Nutrition Focused Physical Findings, Skin, Weight    Discharge Planning:    Too soon to determine     Lurdes Lee RD, LD  Contact: [phone]

## 2024-06-05 NOTE — FLOWSHEET NOTE
06/05/24 1521   Vital Signs   BP (!) 176/71   Temp 97.1 °F (36.2 °C)   Pulse 87   Respirations 18   Weight - Scale 42.6 kg (93 lb 14.7 oz)   Weight Method Bed scale   Percent Weight Change 0   Post-Hemodialysis Assessment   Post-Treatment Procedures Blood returned;Catheter Capped, clamped with Saline x2 ports   Machine Disinfection Process Acid/Vinegar Clean;Heat Disinfect   Rinseback Volume (ml) 400 ml   Blood Volume Processed (Liters) 57 L   Dialyzer Clearance Lightly streaked   Duration of Treatment (minutes) 150 minutes   Heparin Amount Administered During Treatment (mL) 0 mL   Hemodialysis Intake (ml) 400 ml   Hemodialysis Output (ml) 400 ml   NET Removed (ml) 0     stable 2.5 hour treatment. 0 net uf. cath lines flushed with 0.9 ns, clamped and tegos in place. report called to primary nurse.

## 2024-06-06 PROCEDURE — 6370000000 HC RX 637 (ALT 250 FOR IP): Performed by: PHYSICAL MEDICINE & REHABILITATION

## 2024-06-06 PROCEDURE — 97110 THERAPEUTIC EXERCISES: CPT

## 2024-06-06 PROCEDURE — 99232 SBSQ HOSP IP/OBS MODERATE 35: CPT | Performed by: PHYSICAL MEDICINE & REHABILITATION

## 2024-06-06 PROCEDURE — 1180000000 HC REHAB R&B

## 2024-06-06 PROCEDURE — 99232 SBSQ HOSP IP/OBS MODERATE 35: CPT | Performed by: INTERNAL MEDICINE

## 2024-06-06 PROCEDURE — 97535 SELF CARE MNGMENT TRAINING: CPT

## 2024-06-06 PROCEDURE — 97530 THERAPEUTIC ACTIVITIES: CPT

## 2024-06-06 PROCEDURE — 6360000002 HC RX W HCPCS: Performed by: PHYSICAL MEDICINE & REHABILITATION

## 2024-06-06 PROCEDURE — 97116 GAIT TRAINING THERAPY: CPT

## 2024-06-06 PROCEDURE — 97112 NEUROMUSCULAR REEDUCATION: CPT

## 2024-06-06 PROCEDURE — 6370000000 HC RX 637 (ALT 250 FOR IP): Performed by: INTERNAL MEDICINE

## 2024-06-06 RX ADMIN — HEPARIN SODIUM 5000 UNITS: 5000 INJECTION INTRAVENOUS; SUBCUTANEOUS at 09:29

## 2024-06-06 RX ADMIN — AMLODIPINE BESYLATE 10 MG: 10 TABLET ORAL at 09:28

## 2024-06-06 RX ADMIN — SEVELAMER CARBONATE 800 MG: 800 TABLET, FILM COATED ORAL at 09:28

## 2024-06-06 RX ADMIN — HEPARIN SODIUM 5000 UNITS: 5000 INJECTION INTRAVENOUS; SUBCUTANEOUS at 20:12

## 2024-06-06 RX ADMIN — SENNOSIDES 8.6 MG: 8.6 TABLET ORAL at 20:12

## 2024-06-06 RX ADMIN — ACETAMINOPHEN 650 MG: 325 TABLET ORAL at 12:49

## 2024-06-06 RX ADMIN — SERTRALINE HYDROCHLORIDE 50 MG: 50 TABLET ORAL at 09:28

## 2024-06-06 RX ADMIN — DOCUSATE SODIUM 100 MG: 100 CAPSULE, LIQUID FILLED ORAL at 20:11

## 2024-06-06 RX ADMIN — ACETAMINOPHEN 650 MG: 325 TABLET ORAL at 00:40

## 2024-06-06 RX ADMIN — ACETAMINOPHEN 650 MG: 325 TABLET ORAL at 05:37

## 2024-06-06 RX ADMIN — SEVELAMER CARBONATE 800 MG: 800 TABLET, FILM COATED ORAL at 12:49

## 2024-06-06 RX ADMIN — MEGESTROL ACETATE 40 MG: 40 TABLET ORAL at 09:28

## 2024-06-06 RX ADMIN — PRIMIDONE 50 MG: 50 TABLET ORAL at 09:28

## 2024-06-06 RX ADMIN — TRAZODONE HYDROCHLORIDE 100 MG: 100 TABLET ORAL at 20:12

## 2024-06-06 RX ADMIN — BUPROPION HYDROCHLORIDE 150 MG: 150 TABLET, EXTENDED RELEASE ORAL at 09:28

## 2024-06-06 RX ADMIN — LEVETIRACETAM 500 MG: 500 TABLET, FILM COATED ORAL at 09:28

## 2024-06-06 RX ADMIN — BUPROPION HYDROCHLORIDE 300 MG: 300 TABLET, EXTENDED RELEASE ORAL at 20:12

## 2024-06-06 RX ADMIN — SEVELAMER CARBONATE 800 MG: 800 TABLET, FILM COATED ORAL at 17:58

## 2024-06-06 RX ADMIN — ACETAMINOPHEN 650 MG: 325 TABLET ORAL at 17:58

## 2024-06-06 RX ADMIN — DOCUSATE SODIUM 100 MG: 100 CAPSULE, LIQUID FILLED ORAL at 09:28

## 2024-06-06 ASSESSMENT — PAIN DESCRIPTION - LOCATION
LOCATION: HEAD
LOCATION: HEAD

## 2024-06-06 ASSESSMENT — PAIN DESCRIPTION - DESCRIPTORS
DESCRIPTORS: ACHING
DESCRIPTORS: ACHING

## 2024-06-06 ASSESSMENT — PAIN SCALES - GENERAL
PAINLEVEL_OUTOF10: 0
PAINLEVEL_OUTOF10: 4
PAINLEVEL_OUTOF10: 0
PAINLEVEL_OUTOF10: 8

## 2024-06-06 ASSESSMENT — PAIN - FUNCTIONAL ASSESSMENT: PAIN_FUNCTIONAL_ASSESSMENT: ACTIVITIES ARE NOT PREVENTED

## 2024-06-06 ASSESSMENT — PAIN DESCRIPTION - ORIENTATION: ORIENTATION: ANTERIOR

## 2024-06-06 NOTE — PROGRESS NOTES
[facility]   Nephrology progress note  6/6/2024, 10:40 AM      Pt Name:    Deloris Watts  MRN:     [id number]     YOB: 1955  Admit Date:    5/29/2024  4:40 PM    Chief Complaint: Nephrology following for ESRD on hemodialysis.    Subjective:  Patient seen and examined  Resting comfortably no distress   at bedside    Objective:  24HR INTAKE/OUTPUT:    Intake/Output Summary (Last 24 hours) at 6/6/2024 1041  Last data filed at 6/6/2024 0942  Gross per 24 hour   Intake 670 ml   Output 400 ml   Net 270 ml        Admission weight: 43.5 kg (95 lb 14.4 oz)  Wt Readings from Last 3 Encounters:   06/06/24 44.2 kg (97 lb 7.1 oz)   05/29/24 43.5 kg (95 lb 14.4 oz)   05/13/24 39.9 kg (88 lb)        Vitals :   Vitals:    06/05/24 1525 06/05/24 2035 06/06/24 0328 06/06/24 0912   BP: (!) 146/61 (!) 145/60  (!) 149/64   Pulse: 93 88  95   Resp: 18   16   Temp: 98.3 °F (36.8 °C) 98.2 °F (36.8 °C)  98.2 °F (36.8 °C)   TempSrc: Oral Oral  Oral   SpO2: 99% 98%  100%   Weight:   44.2 kg (97 lb 7.1 oz)    Height:           Physical examination  General Appearance:  Well developed. No distress  Mouth/Throat:  Oral mucosa moist  Neck:  Supple, no JVD  Lungs:  Breath sounds: clear  Heart::  S1,S2 heard  Abdomen:  Soft, non - tender  Musculoskeletal:  Edema -no edema    Medications:  Infusion:    sodium chloride       Meds:    traZODone  100 mg Oral Nightly    docusate sodium  100 mg Oral BID    amLODIPine  10 mg Oral Daily    megestrol  40 mg Oral Daily    buPROPion  150 mg Oral QAM    buPROPion  300 mg Oral Nightly    primidone  50 mg Oral Daily    sertraline  50 mg Oral Daily    sevelamer  800 mg Oral TID WC    levETIRAcetam  500 mg Oral Daily    senna  1 tablet Oral Nightly    acetaminophen  650 mg Oral Q6H    heparin (porcine)  5,000 Units SubCUTAneous 2 times per day       Lab Data :  CBC:   Recent Labs     06/04/24  0620   WBC 5.3   HGB 12.0   HCT 39.6          CMP:  Recent Labs

## 2024-06-06 NOTE — PROGRESS NOTES
Physical Medicine & Rehabilitation Progress Note    Chief Complaint:  Fall resulting subdural hematoma     Subjective:    Deloris Watts is a 68 y.o.  right-handed slim  female with history of hypertension, hyperlipidemia, hydronephrosis, fibromyalgia, irritable bowel syndrome, essential tremor, osteoporosis, end-stage renal disease on hemodialysis (MWF) since January 2023, stroke with left side weakness in 1990s, anemia, depression, anxiety, right wrist and right ankle fracture treated conservatively, right proximal humeral fracture due to fall requiring ORIF, status post right carpal tunnel release surgery, status post cervical spine surgery, status post hysterectomy and bilateral oophorectomy, hemorrhoidectomy, sinus surgery, tubal ligation, L2 compression fracture requiring kyphoplasty, LASEK surgery, left femur neck displaced fracture due to fall on 3/10/2024 requiring left hip hemiarthroplasty on 3/11/2024, was admitted on 5/29/2024 for intensive inpatient management of impairment & disability secondary to fall resulting acute left parafalcine subdural hematoma.     The patient is known to inpatient rehab service.  She was previously in our inpatient rehab service from 12/21/2023 to 1/5/2024 after a fall accident on 12/14/2023 resulting L2 compression fracture requiring kyphoplasty, and liver laceration.  She was discharged to home on 1/5/2024 with referral for outpatient PT, OT and speech therapy.  She again sustained a fall accident at home bathroom on 3/10/2024 resulting left femur neck displaced fracture requiring left hip hemiarthroplasty done on 3/11/2024.  She then was admitted to inpatient rehab service again on 3/14/2024.  She was discharged home on 3/30/2024 with referral for outpatient PT and OT treatment after discharge.     The patient's  says he assisted the patient to sit on her rollator walker seat in living room and then pushing the rollator walker to bring the patient back  10.4 (L) 12.0 - 16.0 gm/dl    Hematocrit 34.2 (L) 37.0 - 47.0 %    .0 (H) 81.0 - 99.0 fL    MCH 31.3 26.0 - 33.0 pg    MCHC 30.4 (L) 32.2 - 35.5 gm/dl    RDW-CV 14.8 (H) 11.5 - 14.5 %    RDW-SD 56.2 (H) 35.0 - 45.0 fL    Platelets 223 130 - 400 thou/mm3    MPV 10.4 9.4 - 12.4 fL    Seg Neutrophils 53.3 %    Lymphocytes 34.2 %    Monocytes % 7.3 %    Eosinophils 3.6 %    Basophils 0.9 %    Immature Granulocytes % 0.7 %    Neutrophils Absolute 3.6 1.8 - 7.7 thou/mm3    Lymphocytes Absolute 2.3 1.0 - 4.8 thou/mm3    Monocytes Absolute 0.5 0.4 - 1.3 thou/mm3    Eosinophils Absolute 0.2 0.0 - 0.4 thou/mm3    Basophils Absolute 0.1 0.0 - 0.1 thou/mm3    Immature Grans (Abs) 0.05 0.00 - 0.07 thou/mm3    nRBC 0 /100 wbc   Anion Gap    Collection Time: 06/07/24  6:32 AM   Result Value Ref Range    Anion Gap 16.0 8.0 - 16.0 meq/L   Glomerular Filtration Rate, Estimated    Collection Time: 06/07/24  6:32 AM   Result Value Ref Range    Est, Glom Filt Rate 9 (A) >60 ml/min/1.73m2         Impression:  Status post fall on 5/25/2024 resulting  Small acute left parafalcine subdural hematoma  Cerebral concussion without loss of consciousness  History of stroke with left hemiparesis  End-stage renal disease requiring hemodialysis  History of fall resulting liver laceration and acute L2 compression fracture requiring kyphoplasty  History of status post left hip hemiarthroplasty for displaced left femur neck fracture due to 3/10/2024 fall  Hypertension  Irritable bowel syndrome  Hyperlipidemia  Osteoporosis  Allergic rhinitis  Essential tremor  GERD  History of hydronephrosis  History of fibromyalgia  History of depression and anxiety  History of right proximal humerus fracture requiring ORIF  History of right wrist fracture requiring casting  History of right ankle fracture requiring casting  Cognitive impairment      The patient's condition remained stable.  She is scheduled to have hemodialysis today.  She complains of feeling

## 2024-06-06 NOTE — PROGRESS NOTES
[facility]  Occupational Therapy  Daily Note  Time:   Time In: 1030  Time Out: 1100  Timed Code Treatment Minutes: 30 Minutes  Minutes: 30          Date: 2024  Patient Name: Deloris Watts,   Gender: female      Room: [facility]12/012-A  MRN: [id number]  : 1955  (68 y.o.)  Referring Practitioner: Dr. ZULEMA Whatley  Diagnosis: Subdural hematoma  Additional Pertinent Hx: Deloris Watts is a 68 y.o.  right-handed slim  female with history of hypertension, fibromyalgia, irritable bowel syndrome, essential tremor, end-stage renal disease on hemodialysis (MWF) since 2023, stroke with left side weakness in , depression, anxiety, right wrist and right ankle fracture treated conservatively, right proximal humeral fracture due to fall requiring ORIF, L2 compression fracture requiring kyphoplasty, LASEK surgery, left femur neck displaced fracture due to fall on 3/10/2024 requiring left hip hemiarthroplasty on 3/11/2024, was admitted on 2024 for intensive inpatient management of impairment & disability secondary to fall resulting acute left parafalcine subdural hematoma. Pt sustained a fall at home and brought to the hospital. CT of the head done on 2024 revealed small acute left parafalcine subdural hematoma.  Neurosurgery was consulted on 2024 for the acute subdural hematoma. A dose of tranexamic acid (TXA) was given.  No acute neurosurgical intervention was advised. Pt admitted to [facility] to regain strength, endurance and balance to improve indep with self care before returning home with spouse.    Restrictions/Precautions:  Restrictions/Precautions: Fall Risk, General Precautions, Seizure, Bed Alarm  Position Activity Restriction  Other position/activity restrictions: History of L hemiarthroplasty 3/11/24.  Perfect serve to Dr. Lezama sent  to clarify if patient continues with hip precautions- Per Dr. Lezama, no YONG precautions.

## 2024-06-06 NOTE — PROGRESS NOTES
[facility]  INPATIENT PHYSICAL THERAPY  [facility] - 8K-12/012-A  Daily Note    Time In: 1103  Time Out: 1203  Timed Code Treatment Minutes: 60 Minutes  Minutes: 60          Date: 2024  Patient Name: Deloris Watts,  Gender:  female        MRN: [id number]  : 1955  (68 y.o.)  Referral Date : 24  Referring Practitioner: Jax Whatley MD  Diagnosis: Subdural hematoma, acute (HCC)  Additional Pertinent Hx: Per H&P 24: Deloris Watts is a 68 y.o. right-handed  female with history of hypertension, osteoporosis, end-stage renal disease on hemodialysis (MWF) since 2023, stroke with left side weakness in , anemia, depression, anxiety, left hip hemiarthroplasty on 3/11/2024, and disability secondary to fall resulting acute left parafalcine subdural hematoma. The patient's  says he assisted the patient to sit on her rollator walker seat in living room and then pushing the rollator walker to bring the patient back to bedroom on 2024.  When they were near the bedroom door on the hallway, the patient suddenly slipped off the rollator walker seat and fell down to the floor with her head hitting the carpeted floor of the bedroom entrance.  She did not loss consciousness but felt some headache afterwards. CT of the head done on 2024 revealed small acute left parafalcine subdural hematoma.  Cervical spine CT on 2024 revealed no cervical spine injury.  Neurosurgery was consulted on 2024 for the acute subdural hematoma. A dose of tranexamic acid (TXA) was given.  No acute neurosurgical intervention was advised.  Neurosurgery note stated that Lovenox can be started on 2024 for DVT prophylaxis.  CT of head was repeated later on 2024 and showed stable CT scan of brain without interval change compared to previous study.     Prior Level of Function:  Lives With: Spouse  Type of Home: House  Home Layout: One

## 2024-06-06 NOTE — PLAN OF CARE
Problem: Discharge Planning  Goal: Discharge to home or other facility with appropriate resources  Outcome: Progressing  Flowsheets (Taken 6/6/2024 1233)  Discharge to home or other facility with appropriate resources:   Identify barriers to discharge with patient and caregiver   Identify discharge learning needs (meds, wound care, etc)   Refer to discharge planning if patient needs post-hospital services based on physician order or complex needs related to functional status, cognitive ability or social support system   Arrange for needed discharge resources and transportation as appropriate  Note: Discharge 06/15/24     Problem: Safety - Adult  Goal: Free from fall injury  Outcome: Progressing  Flowsheets (Taken 6/6/2024 1233)  Free From Fall Injury: Instruct family/caregiver on patient safety     Problem: ABCDS Injury Assessment  Goal: Absence of physical injury  Outcome: Progressing  Flowsheets (Taken 6/6/2024 1233)  Absence of Physical Injury: Implement safety measures based on patient assessment     Problem: Skin/Tissue Integrity  Goal: Absence of new skin breakdown  Description: 1.  Monitor for areas of redness and/or skin breakdown  2.  Assess vascular access sites hourly  3.  Every 4-6 hours minimum:  Change oxygen saturation probe site  4.  Every 4-6 hours:  If on nasal continuous positive airway pressure, respiratory therapy assess nares and determine need for appliance change or resting period.  Outcome: Progressing     Problem: Neurosensory - Adult  Goal: Absence of seizures  Outcome: Progressing  Flowsheets (Taken 6/6/2024 1233)  Absence of seizures:   Administer anticonvulsants as ordered   If seizure occurs, turn head to side and suction secretions as needed   Support airway/breathing, administer oxygen as needed     Problem: Skin/Tissue Integrity - Adult  Goal: Skin integrity remains intact  Outcome: Progressing  Flowsheets (Taken 6/6/2024 1233)  Skin Integrity Remains Intact: Monitor for areas of  electrolyte imbalances     Problem: Hematologic - Adult  Goal: Maintains hematologic stability  Outcome: Progressing  Flowsheets (Taken 6/6/2024 1233)  Maintains hematologic stability: Monitor labs for bleeding or clotting disorders     Problem: Chronic Conditions and Co-morbidities  Goal: Patient's chronic conditions and co-morbidity symptoms are monitored and maintained or improved  Outcome: Progressing  Flowsheets (Taken 6/6/2024 1233)  Care Plan - Patient's Chronic Conditions and Co-Morbidity Symptoms are Monitored and Maintained or Improved:   Monitor and assess patient's chronic conditions and comorbid symptoms for stability, deterioration, or improvement   Collaborate with multidisciplinary team to address chronic and comorbid conditions and prevent exacerbation or deterioration     Problem: Nutrition Deficit:  Goal: Optimize nutritional status  Outcome: Progressing  Flowsheets (Taken 6/6/2024 1233)  Nutrient intake appropriate for improving, restoring, or maintaining nutritional needs:   Assess nutritional status and recommend course of action   Monitor oral intake, labs, and treatment plans   Recommend appropriate diets, oral nutritional supplements, and vitamin/mineral supplements     Problem: Pain  Goal: Verbalizes/displays adequate comfort level or baseline comfort level  Outcome: Progressing  Flowsheets (Taken 6/6/2024 1233)  Verbalizes/displays adequate comfort level or baseline comfort level:   Encourage patient to monitor pain and request assistance   Assess pain using appropriate pain scale   Implement non-pharmacological measures as appropriate and evaluate response

## 2024-06-06 NOTE — PROGRESS NOTES
[facility]  INPATIENT PHYSICAL THERAPY  DAILY NOTE  [facility] - 8K-12/012-A    Time In: 0730  Time Out: 0800  Timed Code Treatment Minutes: 30 Minutes  Minutes: 30          Date: 2024  Patient Name: Deloris Watts,  Gender:  female        MRN: [id number]  : 1955  (68 y.o.)  Referral Date : 24  Referring Practitioner: Jax Whatley MD  Diagnosis: Subdural hematoma, acute (HCC)  Additional Pertinent Hx: Per H&P 24: Deloris Watts is a 68 y.o. right-handed  female with history of hypertension, osteoporosis, end-stage renal disease on hemodialysis (MWF) since 2023, stroke with left side weakness in , anemia, depression, anxiety, left hip hemiarthroplasty on 3/11/2024, and disability secondary to fall resulting acute left parafalcine subdural hematoma. The patient's  says he assisted the patient to sit on her rollator walker seat in living room and then pushing the rollator walker to bring the patient back to bedroom on 2024.  When they were near the bedroom door on the hallway, the patient suddenly slipped off the rollator walker seat and fell down to the floor with her head hitting the carpeted floor of the bedroom entrance.  She did not loss consciousness but felt some headache afterwards. CT of the head done on 2024 revealed small acute left parafalcine subdural hematoma.  Cervical spine CT on 2024 revealed no cervical spine injury.  Neurosurgery was consulted on 2024 for the acute subdural hematoma. A dose of tranexamic acid (TXA) was given.  No acute neurosurgical intervention was advised.  Neurosurgery note stated that Lovenox can be started on 2024 for DVT prophylaxis.  CT of head was repeated later on 2024 and showed stable CT scan of brain without interval change compared to previous study.     Prior Level of Function:  Lives With: Spouse  Type of Home: House  Home Layout: One  Mobility:  Rolling to Left: Contact Guard Assistance, with head of bed raised, with rail, with verbal cues , with increased time for completion   Supine to Sit: Minimal Assistance, Moderate Assistance, X 1, with head of bed raised, with rail, with verbal cues , with increased time for completion, required assist at trunk  Scooting: Minimal Assistance, Moderate Assistance, X 1, to edge of bed, MOd A to scoot back into chair    Transfers:  Sit to Stand: Minimal Assistance, X 1, with increased time for completion, cues for hand placement  Stand to Sit:Contact Guard Assistance    Ambulation:  Contact Guard Assistance, Minimal Assistance, X 1  Distance: 15ft, 18ft  Surface: Level Tile  Device:Rolling Walker  Gait Deviations:  Forward Flexed Posture, Slow Anna, Decreased Step Length Bilaterally, Decreased Weight Shift Bilaterally, Decreased Gait Speed, Decreased Heel Strike Bilaterally, Decreased Foot Clearance, Moderate Path Deviations, Unsteady Gait, Increased Reliance on Rolling Walker, and frequent cues to keep body inside walker, veers towards the left, assist for straight path with walker, noted body slightly turned to the left during ambulation    Balance:  Dynamic Sitting Balance: Stand By Assistance, while patient sitting on toilet, also while sitting edge of bed, required BUE support for safety  Dynamic Standing Balance: Contact Guard Assistance, BUE support, slight posterior lean     Exercise:  Patient was guided in 1 set(s) 15 reps of exercise to both lower extremities.  Heelslides, Hip abduction/adduction, Straight leg raises, Seated marches, Long arc quads, and Seated isometric hip adduction.  Exercises were completed for increased independence with functional mobility.    Functional Outcome Measures: Not completed     Modified [place] Scale:  +4 - Moderately severe disability; unable to walk and attend to bodily needs without assistance.   Patient could not live alone and could not walk from one room to

## 2024-06-06 NOTE — PROGRESS NOTES
[facility]  Occupational Therapy  Daily Note  Time:   Time In: 1415  Time Out: 1515  Timed Code Treatment Minutes: 60 Minutes  Minutes: 60          Date: 2024  Patient Name: Deloris Watts,   Gender: female      Room: [facility]12/012  MRN: [id number]  : 1955  (68 y.o.)  Referring Practitioner: Dr. ZULEMA Whatley  Diagnosis: Subdural hematoma  Additional Pertinent Hx: Deloris Watts is a 68 y.o.  right-handed slim  female with history of hypertension, fibromyalgia, irritable bowel syndrome, essential tremor, end-stage renal disease on hemodialysis (MWF) since 2023, stroke with left side weakness in , depression, anxiety, right wrist and right ankle fracture treated conservatively, right proximal humeral fracture due to fall requiring ORIF, L2 compression fracture requiring kyphoplasty, LASEK surgery, left femur neck displaced fracture due to fall on 3/10/2024 requiring left hip hemiarthroplasty on 3/11/2024, was admitted on 2024 for intensive inpatient management of impairment & disability secondary to fall resulting acute left parafalcine subdural hematoma. Pt sustained a fall at home and brought to the hospital. CT of the head done on 2024 revealed small acute left parafalcine subdural hematoma.  Neurosurgery was consulted on 2024 for the acute subdural hematoma. A dose of tranexamic acid (TXA) was given.  No acute neurosurgical intervention was advised. Pt admitted to [facility] to regain strength, endurance and balance to improve indep with self care before returning home with spouse.    Restrictions/Precautions:  Restrictions/Precautions: Fall Risk, General Precautions, Seizure, Bed Alarm  Position Activity Restriction  Other position/activity restrictions: History of L hemiarthroplasty 3/11/24.  Perfect serve to Dr. Lezama sent  to clarify if patient continues with hip precautions- Per Dr. Lezama, no YONG precautions.  enhance performance with seated ADLs.  Long Term Goals  Time Frame for Long Term Goals : 3 weeks from OT evaluation  Long Term Goal 1: Pt will complete tub/shower transfers with CGA using least restrictive AD to return to completing sponge baths in shower at home.  Long Term Goal 2: Pt will sequence/ problem solve bathing and dressing tasks with overall SBA to improve indep with daily dressing tasks.  Long Term Goal 3: Pt will navigate RW to/ from bathroom with SBA to enhance performace with bathroom related tasks.  Long Term Goal 4: Pt will improve UB strength to 4+/5 to improve global strength needed for ADL performance.    Following session, patient left in safe position with all fall risk precautions in place.

## 2024-06-06 NOTE — CARE COORDINATION
Patient alert and oriented x3, re oriented to time. Denies any pain or other needs. Patient noted to have intermittent confusion through out conversation.  Bed in low position, call light in reach, alarm on and functioning properly.

## 2024-06-06 NOTE — PALLIATIVE CARE
Initial Evaluation        Patient:   Deloris Watts  YOB: 1955  Age:  68 y.o.  Room:  [facility]12/012-  MRN:  [id number]   Acct: [id number]    Date of Admission:  5/29/2024  4:40 PM  Date of Service:  6/6/2024  Completed By:  Baylee Gerber RN                 Reason for Palliative Care Evaluation:-               [x] Code Status Discussion              [x] Goals of Care              [] Pain/Symptom Management               [] Emotional Support              [] Other:                      Advance Directives:-    [] [place] DNR Form  [x] Living Will  [x] Medical POA              Current Code Status:-     [x] Full Resuscitation  [] DNR-Comfort Care-Arrest  [] DNR-Comfort Care       [] Limited Resuscitation             [] No CPR            [] No shock            [] No ET intubation/reintubation            [] No resuscitative medications            [] Other limitation:               Palliative Performance Status:-      [] 100%  Full ambulation; normal activity and work; no evidence of disease; able to do own self care; normal intake; fully conscious     [] 90%   Full ambulation; normal activity and work; some evidence of disease; able to do own self care; normal intake; fully conscious    [] 80%   Full ambulation; normal activity with effort; some evidence of disease; able to do own self care; normal or reduced intake; fully conscious    [] 70%  Ambulation reduced; unable to perform normal job/work; significant  disease; able to do own self care; normal or reduced intake; fully conscious    [x] 60%  Ambulation reduced; Significant disease;Can't do hobbies/housework; intake normal or reduced; occasional assist; LOC full/confusion    [] 50%  Mainly sit/lie; Extensive disease; Can't do any work; Considerable assist; intake normal or reduced; LOC full/confusion    [] 40%  Mainly in bed; Extensive disease; Mainly assist; intake normal or reduced; LOC full/confusion     [] 30%  Bed Bound; Extensive disease;  overwhelming. Emotional support provided.  states he would like time to think about it, and have further code status discussion with his wife. Would like patient to remain a full code at this time. This RN provided  with business card with phone number to palliative care, if needed.         Plan/Follow-Up:-    Will follow up at a later date regarding code status discussion. Will continue to follow throughout hospitalization. Please call if needs arise.            Electronically signed by Baylee Gerber RN on 6/6/2024 at 9:53 AM           Palliative Care Office: [phone]

## 2024-06-06 NOTE — PROGRESS NOTES
[facility]  Recreational Therapy  Daily Note  [facility]    Time Spent with Patient: 23 minutes    Date:  6/6/2024       Patient Name: Deloris Watts      MRN: [id number]      YOB: 1955 (68 y.o.)       Gender: female  Diagnosis: Subdural hematoma  Referring Practitioner: Dr. ZULEMA Whatley    RESTRICTIONS/PRECAUTIONS:  Restrictions/Precautions: Fall Risk, General Precautions, Seizure, Bed Alarm     Hearing: Within functional limits    PAIN: 0    SUBJECTIVE:  this will make Tenzin happy    OBJECTIVE:  Pt enjoyed getting her hair washed and styled by our beautician-pleasant and affect bright but did not initiate conversation-pt fatigued and rested her eyes during activity-appreciative          Patient Education  New Education Provided: Importance of Leisure,     Electronically signed by: Elena Tan, CTRS  Date: 6/6/2024

## 2024-06-06 NOTE — PLAN OF CARE
Problem: Discharge Planning  Goal: Discharge to home or other facility with appropriate resources  6/5/2024 2041 by Kesha Rodriguez, RN  Outcome: Progressing     Problem: Safety - Adult  Goal: Free from fall injury  6/5/2024 2041 by Kesha Rodriguez, RN  Outcome: Progressing     Problem: ABCDS Injury Assessment  Goal: Absence of physical injury  6/5/2024 2041 by Kesha Rodriguez, RN  Outcome: Progressing     Problem: Skin/Tissue Integrity  Goal: Absence of new skin breakdown  Description: 1.  Monitor for areas of redness and/or skin breakdown  2.  Assess vascular access sites hourly  3.  Every 4-6 hours minimum:  Change oxygen saturation probe site  4.  Every 4-6 hours:  If on nasal continuous positive airway pressure, respiratory therapy assess nares and determine need for appliance change or resting period.  6/5/2024 2041 by Kesha Rodriguez, RN  Outcome: Progressing

## 2024-06-07 LAB
ANION GAP SERPL CALC-SCNC: 16 MEQ/L (ref 8–16)
BASOPHILS ABSOLUTE: 0.1 THOU/MM3 (ref 0–0.1)
BASOPHILS NFR BLD AUTO: 0.9 %
BUN SERPL-MCNC: 45 MG/DL (ref 7–22)
CALCIUM SERPL-MCNC: 9.1 MG/DL (ref 8.5–10.5)
CHLORIDE SERPL-SCNC: 106 MEQ/L (ref 98–111)
CO2 SERPL-SCNC: 19 MEQ/L (ref 23–33)
CREAT SERPL-MCNC: 5.1 MG/DL (ref 0.4–1.2)
DEPRECATED RDW RBC AUTO: 56.2 FL (ref 35–45)
EOSINOPHIL NFR BLD AUTO: 3.6 %
EOSINOPHILS ABSOLUTE: 0.2 THOU/MM3 (ref 0–0.4)
ERYTHROCYTE [DISTWIDTH] IN BLOOD BY AUTOMATED COUNT: 14.8 % (ref 11.5–14.5)
GFR SERPL CREATININE-BSD FRML MDRD: 9 ML/MIN/1.73M2
GLUCOSE SERPL-MCNC: 86 MG/DL (ref 70–108)
HCT VFR BLD AUTO: 34.2 % (ref 37–47)
HGB BLD-MCNC: 10.4 GM/DL (ref 12–16)
IMM GRANULOCYTES # BLD AUTO: 0.05 THOU/MM3 (ref 0–0.07)
IMM GRANULOCYTES NFR BLD AUTO: 0.7 %
LYMPHOCYTES ABSOLUTE: 2.3 THOU/MM3 (ref 1–4.8)
LYMPHOCYTES NFR BLD AUTO: 34.2 %
MCH RBC QN AUTO: 31.3 PG (ref 26–33)
MCHC RBC AUTO-ENTMCNC: 30.4 GM/DL (ref 32.2–35.5)
MCV RBC AUTO: 103 FL (ref 81–99)
MONOCYTES ABSOLUTE: 0.5 THOU/MM3 (ref 0.4–1.3)
MONOCYTES NFR BLD AUTO: 7.3 %
NEUTROPHILS ABSOLUTE: 3.6 THOU/MM3 (ref 1.8–7.7)
NEUTROPHILS NFR BLD AUTO: 53.3 %
NRBC BLD AUTO-RTO: 0 /100 WBC
PLATELET # BLD AUTO: 223 THOU/MM3 (ref 130–400)
PMV BLD AUTO: 10.4 FL (ref 9.4–12.4)
POTASSIUM SERPL-SCNC: 4.4 MEQ/L (ref 3.5–5.2)
RBC # BLD AUTO: 3.32 MILL/MM3 (ref 4.2–5.4)
SODIUM SERPL-SCNC: 141 MEQ/L (ref 135–145)
WBC # BLD AUTO: 6.7 THOU/MM3 (ref 4.8–10.8)

## 2024-06-07 PROCEDURE — 97110 THERAPEUTIC EXERCISES: CPT

## 2024-06-07 PROCEDURE — 6360000002 HC RX W HCPCS: Performed by: PHYSICAL MEDICINE & REHABILITATION

## 2024-06-07 PROCEDURE — 1180000000 HC REHAB R&B

## 2024-06-07 PROCEDURE — 36415 COLL VENOUS BLD VENIPUNCTURE: CPT

## 2024-06-07 PROCEDURE — 97530 THERAPEUTIC ACTIVITIES: CPT

## 2024-06-07 PROCEDURE — 85025 COMPLETE CBC W/AUTO DIFF WBC: CPT

## 2024-06-07 PROCEDURE — 97535 SELF CARE MNGMENT TRAINING: CPT

## 2024-06-07 PROCEDURE — 97116 GAIT TRAINING THERAPY: CPT

## 2024-06-07 PROCEDURE — 6370000000 HC RX 637 (ALT 250 FOR IP): Performed by: INTERNAL MEDICINE

## 2024-06-07 PROCEDURE — 97112 NEUROMUSCULAR REEDUCATION: CPT

## 2024-06-07 PROCEDURE — 80048 BASIC METABOLIC PNL TOTAL CA: CPT

## 2024-06-07 PROCEDURE — 99232 SBSQ HOSP IP/OBS MODERATE 35: CPT | Performed by: PHYSICAL MEDICINE & REHABILITATION

## 2024-06-07 PROCEDURE — 90935 HEMODIALYSIS ONE EVALUATION: CPT

## 2024-06-07 PROCEDURE — 99232 SBSQ HOSP IP/OBS MODERATE 35: CPT | Performed by: INTERNAL MEDICINE

## 2024-06-07 PROCEDURE — 6370000000 HC RX 637 (ALT 250 FOR IP): Performed by: PHYSICAL MEDICINE & REHABILITATION

## 2024-06-07 RX ADMIN — BUPROPION HYDROCHLORIDE 300 MG: 300 TABLET, EXTENDED RELEASE ORAL at 22:18

## 2024-06-07 RX ADMIN — HEPARIN SODIUM 5000 UNITS: 5000 INJECTION INTRAVENOUS; SUBCUTANEOUS at 10:24

## 2024-06-07 RX ADMIN — PRIMIDONE 50 MG: 50 TABLET ORAL at 10:23

## 2024-06-07 RX ADMIN — TRAZODONE HYDROCHLORIDE 100 MG: 100 TABLET ORAL at 22:17

## 2024-06-07 RX ADMIN — SERTRALINE HYDROCHLORIDE 50 MG: 50 TABLET ORAL at 10:23

## 2024-06-07 RX ADMIN — HEPARIN SODIUM 5000 UNITS: 5000 INJECTION INTRAVENOUS; SUBCUTANEOUS at 22:18

## 2024-06-07 RX ADMIN — SEVELAMER CARBONATE 800 MG: 800 TABLET, FILM COATED ORAL at 18:01

## 2024-06-07 RX ADMIN — LEVETIRACETAM 500 MG: 500 TABLET, FILM COATED ORAL at 10:23

## 2024-06-07 RX ADMIN — DOCUSATE SODIUM 100 MG: 100 CAPSULE, LIQUID FILLED ORAL at 10:23

## 2024-06-07 RX ADMIN — BUPROPION HYDROCHLORIDE 150 MG: 150 TABLET, EXTENDED RELEASE ORAL at 10:23

## 2024-06-07 RX ADMIN — ACETAMINOPHEN 650 MG: 325 TABLET ORAL at 05:33

## 2024-06-07 RX ADMIN — ACETAMINOPHEN 650 MG: 325 TABLET ORAL at 01:00

## 2024-06-07 RX ADMIN — SENNOSIDES 8.6 MG: 8.6 TABLET ORAL at 22:17

## 2024-06-07 RX ADMIN — SEVELAMER CARBONATE 800 MG: 800 TABLET, FILM COATED ORAL at 14:19

## 2024-06-07 RX ADMIN — ACETAMINOPHEN 650 MG: 325 TABLET ORAL at 14:21

## 2024-06-07 RX ADMIN — MEGESTROL ACETATE 40 MG: 40 TABLET ORAL at 10:23

## 2024-06-07 RX ADMIN — SEVELAMER CARBONATE 800 MG: 800 TABLET, FILM COATED ORAL at 10:23

## 2024-06-07 RX ADMIN — ACETAMINOPHEN 650 MG: 325 TABLET ORAL at 18:09

## 2024-06-07 RX ADMIN — DOCUSATE SODIUM 100 MG: 100 CAPSULE, LIQUID FILLED ORAL at 22:17

## 2024-06-07 RX ADMIN — AMLODIPINE BESYLATE 10 MG: 10 TABLET ORAL at 10:23

## 2024-06-07 ASSESSMENT — PAIN DESCRIPTION - DESCRIPTORS
DESCRIPTORS: ACHING

## 2024-06-07 ASSESSMENT — PAIN SCALES - GENERAL
PAINLEVEL_OUTOF10: 0
PAINLEVEL_OUTOF10: 5
PAINLEVEL_OUTOF10: 3
PAINLEVEL_OUTOF10: 0
PAINLEVEL_OUTOF10: 8
PAINLEVEL_OUTOF10: 3
PAINLEVEL_OUTOF10: 0

## 2024-06-07 ASSESSMENT — PAIN - FUNCTIONAL ASSESSMENT
PAIN_FUNCTIONAL_ASSESSMENT: ACTIVITIES ARE NOT PREVENTED

## 2024-06-07 ASSESSMENT — PAIN DESCRIPTION - ORIENTATION
ORIENTATION: LOWER
ORIENTATION: RIGHT;LEFT

## 2024-06-07 ASSESSMENT — PAIN DESCRIPTION - LOCATION
LOCATION: ABDOMEN
LOCATION: LEG
LOCATION: ABDOMEN

## 2024-06-07 NOTE — PROGRESS NOTES
Physical Medicine & Rehabilitation Progress Note    Chief Complaint:  Fall resulting subdural hematoma     Subjective:    Deloris Watts is a 68 y.o.  right-handed slim  female with history of hypertension, hyperlipidemia, hydronephrosis, fibromyalgia, irritable bowel syndrome, essential tremor, osteoporosis, end-stage renal disease on hemodialysis (MWF) since January 2023, stroke with left side weakness in 1990s, anemia, depression, anxiety, right wrist and right ankle fracture treated conservatively, right proximal humeral fracture due to fall requiring ORIF, status post right carpal tunnel release surgery, status post cervical spine surgery, status post hysterectomy and bilateral oophorectomy, hemorrhoidectomy, sinus surgery, tubal ligation, L2 compression fracture requiring kyphoplasty, LASEK surgery, left femur neck displaced fracture due to fall on 3/10/2024 requiring left hip hemiarthroplasty on 3/11/2024, was admitted on 5/29/2024 for intensive inpatient management of impairment & disability secondary to fall resulting acute left parafalcine subdural hematoma.     The patient is known to inpatient rehab service.  She was previously in our inpatient rehab service from 12/21/2023 to 1/5/2024 after a fall accident on 12/14/2023 resulting L2 compression fracture requiring kyphoplasty, and liver laceration.  She was discharged to home on 1/5/2024 with referral for outpatient PT, OT and speech therapy.  She again sustained a fall accident at home bathroom on 3/10/2024 resulting left femur neck displaced fracture requiring left hip hemiarthroplasty done on 3/11/2024.  She then was admitted to inpatient rehab service again on 3/14/2024.  She was discharged home on 3/30/2024 with referral for outpatient PT and OT treatment after discharge.     The patient's  says he assisted the patient to sit on her rollator walker seat in living room and then pushing the rollator walker to bring the patient back  depends with increased time. Pt completes hip mgmt standing with 1-2 UE release from RW. Pt requires min A to don B depends and pants over LLE.    Footwear Management: Minimal Assistance.  Pt able to doff/don B socks and shoes. Pt requires A to don and tie L sock and shoe this date  Toileting: Contact Guard Assistance.  For standing balance; pt completes hip mgmt standing.  Toilet Transfer: Minimal Assistance. With min vc for hand placement .  Pt required mod vc for initiation with tasks this date and presented slow processing.      BALANCE:  Sitting Balance:  Stand By Assistance.    Standing Balance: Minimal Assistance. Pt tolerated > 3 min with 1 UE release from RW during grooming tasks. RLE weakness noted and mod vc for upright position.     BED MOBILITY:  Supine to Sit: Minimal Assistance, with head of bed flat, without rail, with increased time for completion    Sit to Supine: Minimal Assistance, with head of bed flat, without rail, with increased time for completion       TRANSFERS:  Sit to Stand:  Minimal Assistance, X 1, with increased time for completion, cues for hand placement.    Stand to Sit: Contact Guard Assistance, with increased time for completion, cues for hand placement.       FUNCTIONAL MOBILITY:  Assistive Device: Rolling Walker  Assist Level:  Minimal Assistance and with increased time for completion.   Distance: To and from bathroom x2   Pt pankaj R LE weakness       ADDITIONAL ACTIVITIES:  Patient completed BUE strengthening exercises with skilled education on HEP: completed x10 reps x1 set with a min resistive theraband in all joints and all planes in order to improve UE strength and activity tolerance required for BADL routine and toilet / shower transfers. Patient tolerated well, requiring x3 rest breaks. Patient also required mod cues for technique.      Patient participated in education on ECTs. Pt pankaj good understanding and given examples. Pt states when she struggles with tasks

## 2024-06-07 NOTE — PLAN OF CARE
Adult  Goal: Skin integrity remains intact  Outcome: Progressing  Flowsheets (Taken 6/7/2024 1046)  Skin Integrity Remains Intact: Monitor for areas of redness and/or skin breakdown     Problem: Musculoskeletal - Adult  Goal: Return mobility to safest level of function  Outcome: Progressing  Flowsheets (Taken 6/7/2024 1046)  Return Mobility to Safest Level of Function:   Instruct patient/family in ordered activity level   Apply continuous passive motion per provider or physical therapy orders to increase flexion toward goal   Assess patient stability and activity tolerance for standing, transferring and ambulating with or without assistive devices     Problem: Gastrointestinal - Adult  Goal: Maintains or returns to baseline bowel function  Outcome: Progressing  Flowsheets (Taken 6/7/2024 1046)  Maintains or returns to baseline bowel function:   Assess bowel function   Encourage oral fluids to ensure adequate hydration   Encourage mobilization and activity     Problem: Genitourinary - Adult  Goal: Absence of urinary retention  Outcome: Progressing  Flowsheets (Taken 6/7/2024 1046)  Absence of urinary retention:   Assess patient’s ability to void and empty bladder   Monitor intake/output and perform bladder scan as needed     Problem: Infection - Adult  Goal: Absence of infection at discharge  Outcome: Progressing  Flowsheets (Taken 6/7/2024 1046)  Absence of infection at discharge: Assess and monitor for signs and symptoms of infection  Goal: Absence of infection during hospitalization  Outcome: Progressing  Flowsheets (Taken 6/7/2024 1046)  Absence of infection during hospitalization:   Assess and monitor for signs and symptoms of infection   Administer medications as ordered     Problem: Metabolic/Fluid and Electrolytes - Adult  Goal: Electrolytes maintained within normal limits  Outcome: Progressing  Flowsheets (Taken 6/7/2024 1046)  Electrolytes maintained within normal limits: Monitor labs and assess patient for  signs and symptoms of electrolyte imbalances     Problem: Hematologic - Adult  Goal: Maintains hematologic stability  Outcome: Progressing  Flowsheets (Taken 6/7/2024 1046)  Maintains hematologic stability: Monitor labs for bleeding or clotting disorders     Problem: Chronic Conditions and Co-morbidities  Goal: Patient's chronic conditions and co-morbidity symptoms are monitored and maintained or improved  Outcome: Progressing  Flowsheets (Taken 6/7/2024 1046)  Care Plan - Patient's Chronic Conditions and Co-Morbidity Symptoms are Monitored and Maintained or Improved:   Monitor and assess patient's chronic conditions and comorbid symptoms for stability, deterioration, or improvement   Collaborate with multidisciplinary team to address chronic and comorbid conditions and prevent exacerbation or deterioration     Problem: Nutrition Deficit:  Goal: Optimize nutritional status  Outcome: Progressing  Flowsheets (Taken 6/7/2024 1046)  Nutrient intake appropriate for improving, restoring, or maintaining nutritional needs:   Assess nutritional status and recommend course of action   Recommend appropriate diets, oral nutritional supplements, and vitamin/mineral supplements     Problem: Pain  Goal: Verbalizes/displays adequate comfort level or baseline comfort level  Outcome: Progressing  Flowsheets (Taken 6/7/2024 1046)  Verbalizes/displays adequate comfort level or baseline comfort level: Encourage patient to monitor pain and request assistance

## 2024-06-07 NOTE — CARE COORDINATION
Patient impulsive and unable to answer year during assessment. Tele sitter initiated and patient moved closer to nursing station.

## 2024-06-07 NOTE — CARE COORDINATION
Slept well throughout the night, up x 1 assist with gait belt and walker to the bathroom. Patient has to be reminded to walk up to the walker, did not tolerate returning to bed well, moderate weakness noted.

## 2024-06-07 NOTE — PLAN OF CARE
Problem: Discharge Planning  Goal: Discharge to home or other facility with appropriate resources  6/6/2024 2017 by Kesha Rodriguez, RN  Outcome: Progressing     Problem: Safety - Adult  Goal: Free from fall injury  6/6/2024 2017 by Kesha Rodriguez, RN  Outcome: Progressing     Problem: ABCDS Injury Assessment  Goal: Absence of physical injury  6/6/2024 2017 by Kesha Rodriguez, RN  Outcome: Progressing     Problem: Skin/Tissue Integrity  Goal: Absence of new skin breakdown  Description: 1.  Monitor for areas of redness and/or skin breakdown  2.  Assess vascular access sites hourly  3.  Every 4-6 hours minimum:  Change oxygen saturation probe site  4.  Every 4-6 hours:  If on nasal continuous positive airway pressure, respiratory therapy assess nares and determine need for appliance change or resting period.  6/6/2024 2017 by Kesha Rodriguez, RN  Outcome: Progressing     Problem: Neurosensory - Adult  Goal: Absence of seizures  6/6/2024 2017 by Kesha Rodriguez, RN  Outcome: Progressing     Problem: Skin/Tissue Integrity - Adult  Goal: Skin integrity remains intact  6/6/2024 2017 by Kesha Rodriguez, RN  Outcome: Progressing

## 2024-06-07 NOTE — FLOWSHEET NOTE
06/07/24 1405   Vital Signs   BP (!) 158/65   Temp 97.9 °F (36.6 °C)   Pulse 94   Respirations 18   Weight - Scale 42.8 kg (94 lb 5.7 oz)   Weight Method Bed scale   Percent Weight Change -2.28   Post-Hemodialysis Assessment   Post-Treatment Procedures Blood returned;Catheter Capped, clamped with Saline x2 ports   Machine Disinfection Process Acid/Vinegar Clean;Exterior Machine Disinfection   Rinseback Volume (ml) 400 ml   Blood Volume Processed (Liters) 56.1 L   Dialyzer Clearance Lightly streaked   Duration of Treatment (minutes) 150 minutes   Hemodialysis Intake (ml) 400 ml   Hemodialysis Output (ml) 1400 ml   NET Removed (ml) 1000   Tolerated Treatment Good     2.5 hour treatment complete. 1 L net UF removed. Pt. tolerated well. Cath lines locked with normal saline following treatment.  Blood returned, paperowork printed and placed in bin to be scanned in to EMR.

## 2024-06-07 NOTE — PROGRESS NOTES
[facility]   Nephrology progress note  6/7/2024, 8:38 AM      Pt Name:    Deloris Watts  MRN:     [id number]     YOB: 1955  Admit Date:    5/29/2024  4:40 PM    Chief Complaint: Nephrology following for ESRD on hemodialysis.    Subjective:  Patient seen and examined  Undergoing rehab, tolerating well    Objective:  24HR INTAKE/OUTPUT:    Intake/Output Summary (Last 24 hours) at 6/7/2024 0838  Last data filed at 6/7/2024 0542  Gross per 24 hour   Intake 400 ml   Output --   Net 400 ml        Admission weight: 43.5 kg (95 lb 14.4 oz)  Wt Readings from Last 3 Encounters:   06/07/24 45.9 kg (101 lb 3.1 oz)   05/29/24 43.5 kg (95 lb 14.4 oz)   05/13/24 39.9 kg (88 lb)        Vitals :   Vitals:    06/06/24 0328 06/06/24 0912 06/06/24 2009 06/07/24 0536   BP:  (!) 149/64 (!) 132/58    Pulse:  95 (!) 103    Resp:  16     Temp:  98.2 °F (36.8 °C) 98.1 °F (36.7 °C)    TempSrc:  Oral Oral    SpO2:  100% 100%    Weight: 44.2 kg (97 lb 7.1 oz)   45.9 kg (101 lb 3.1 oz)   Height:           Physical examination  General Appearance:  Well developed. No distress  Mouth/Throat:  Oral mucosa moist  Neck:  Supple, no JVD  Lungs:  Breath sounds: clear  Heart::  S1,S2 heard  Abdomen:  Soft, non - tender  Musculoskeletal:  Edema -no edema    Medications:  Infusion:    sodium chloride       Meds:    traZODone  100 mg Oral Nightly    docusate sodium  100 mg Oral BID    amLODIPine  10 mg Oral Daily    megestrol  40 mg Oral Daily    buPROPion  150 mg Oral QAM    buPROPion  300 mg Oral Nightly    primidone  50 mg Oral Daily    sertraline  50 mg Oral Daily    sevelamer  800 mg Oral TID WC    levETIRAcetam  500 mg Oral Daily    senna  1 tablet Oral Nightly    acetaminophen  650 mg Oral Q6H    heparin (porcine)  5,000 Units SubCUTAneous 2 times per day       Lab Data :  CBC:   Recent Labs     06/07/24  0632   WBC 6.7   HGB 10.4*   HCT 34.2*          CMP:  Recent Labs     06/07/24  0632      K 4.4

## 2024-06-07 NOTE — PROGRESS NOTES
[facility]  Occupational Therapy  Daily Note  Time:   Time In: 0900  Time Out: 1030  Timed Code Treatment Minutes: 90 Minutes  Minutes: 90          Date: 2024  Patient Name: Deloris Watts,   Gender: female      Room: [facility]12/012  MRN: [id number]  : 1955  (68 y.o.)  Referring Practitioner: Dr. ZULEMA Whatley  Diagnosis: Subdural hematoma  Additional Pertinent Hx: Deloris Watts is a 68 y.o.  right-handed slim  female with history of hypertension, fibromyalgia, irritable bowel syndrome, essential tremor, end-stage renal disease on hemodialysis (MWF) since 2023, stroke with left side weakness in , depression, anxiety, right wrist and right ankle fracture treated conservatively, right proximal humeral fracture due to fall requiring ORIF, L2 compression fracture requiring kyphoplasty, LASEK surgery, left femur neck displaced fracture due to fall on 3/10/2024 requiring left hip hemiarthroplasty on 3/11/2024, was admitted on 2024 for intensive inpatient management of impairment & disability secondary to fall resulting acute left parafalcine subdural hematoma. Pt sustained a fall at home and brought to the hospital. CT of the head done on 2024 revealed small acute left parafalcine subdural hematoma.  Neurosurgery was consulted on 2024 for the acute subdural hematoma. A dose of tranexamic acid (TXA) was given.  No acute neurosurgical intervention was advised. Pt admitted to [facility] to regain strength, endurance and balance to improve indep with self care before returning home with spouse.    Restrictions/Precautions:  Restrictions/Precautions: Fall Risk, General Precautions, Seizure, Bed Alarm  Position Activity Restriction  Other position/activity restrictions: History of L hemiarthroplasty 3/11/24.  Perfect serve to Dr. Lezama sent  to clarify if patient continues with hip precautions- Per Dr. Lezama, no YONG precautions.

## 2024-06-07 NOTE — PROGRESS NOTES
[facility]  Recreational Therapy  Daily Note  [facility]    Time Spent with Patient: 10 minutes    Date:  6/7/2024       Patient Name: Deloris Watts      MRN: [id number]      YOB: 1955 (68 y.o.)       Gender: female  Diagnosis: Subdural hematoma  Referring Practitioner: Dr. ZULEMA Whatley    RESTRICTIONS/PRECAUTIONS:  Restrictions/Precautions: Fall Risk, General Precautions, Seizure, Bed Alarm     Hearing: Within functional limits    PAIN: 0-no c/o pain     SUBJECTIVE:  I will do some word search puzzles    OBJECTIVE:  Upon arrival pt sitting up in chair and bright affect-states she feels she is getting stronger and is supposed to go home next week-gave pt some word search puzzles to use in her free time-supportive contact given         Patient Education  New Education Provided: Importance of Leisure,     Electronically signed by: Elena Tan, CTRS  Date: 6/7/2024

## 2024-06-07 NOTE — PROGRESS NOTES
[facility]  INPATIENT PHYSICAL THERAPY  [facility]-12/012-A  Daily Note    Time In: 0800  Time Out: 0900  Timed Code Treatment Minutes: 60 Minutes  Minutes: 60          Date: 2024  Patient Name: Deloris Watts,  Gender:  female        MRN: [id number]  : 1955  (68 y.o.)  Referral Date : 24  Referring Practitioner: Jax Whatley MD  Diagnosis: Subdural hematoma, acute (HCC)  Additional Pertinent Hx: Per H&P 24: Deloris Watts is a 68 y.o. right-handed  female with history of hypertension, osteoporosis, end-stage renal disease on hemodialysis (MWF) since 2023, stroke with left side weakness in , anemia, depression, anxiety, left hip hemiarthroplasty on 3/11/2024, and disability secondary to fall resulting acute left parafalcine subdural hematoma. The patient's  says he assisted the patient to sit on her rollator walker seat in living room and then pushing the rollator walker to bring the patient back to bedroom on 2024.  When they were near the bedroom door on the hallway, the patient suddenly slipped off the rollator walker seat and fell down to the floor with her head hitting the carpeted floor of the bedroom entrance.  She did not loss consciousness but felt some headache afterwards. CT of the head done on 2024 revealed small acute left parafalcine subdural hematoma.  Cervical spine CT on 2024 revealed no cervical spine injury.  Neurosurgery was consulted on 2024 for the acute subdural hematoma. A dose of tranexamic acid (TXA) was given.  No acute neurosurgical intervention was advised.  Neurosurgery note stated that Lovenox can be started on 2024 for DVT prophylaxis.  CT of head was repeated later on 2024 and showed stable CT scan of brain without interval change compared to previous study.     Prior Level of Function:  Lives With: Spouse  Type of Home: House  Home Layout: One

## 2024-06-08 LAB
ANION GAP SERPL CALC-SCNC: 14 MEQ/L (ref 8–16)
BUN SERPL-MCNC: 30 MG/DL (ref 7–22)
CALCIUM SERPL-MCNC: 8.9 MG/DL (ref 8.5–10.5)
CHLORIDE SERPL-SCNC: 104 MEQ/L (ref 98–111)
CO2 SERPL-SCNC: 21 MEQ/L (ref 23–33)
CREAT SERPL-MCNC: 3.5 MG/DL (ref 0.4–1.2)
GFR SERPL CREATININE-BSD FRML MDRD: 13 ML/MIN/1.73M2
GLUCOSE BLD STRIP.AUTO-MCNC: 75 MG/DL (ref 70–108)
GLUCOSE SERPL-MCNC: 81 MG/DL (ref 70–108)
POTASSIUM SERPL-SCNC: 4.1 MEQ/L (ref 3.5–5.2)
SODIUM SERPL-SCNC: 139 MEQ/L (ref 135–145)

## 2024-06-08 PROCEDURE — 99232 SBSQ HOSP IP/OBS MODERATE 35: CPT | Performed by: PHYSICAL MEDICINE & REHABILITATION

## 2024-06-08 PROCEDURE — 36415 COLL VENOUS BLD VENIPUNCTURE: CPT

## 2024-06-08 PROCEDURE — 82948 REAGENT STRIP/BLOOD GLUCOSE: CPT

## 2024-06-08 PROCEDURE — 99232 SBSQ HOSP IP/OBS MODERATE 35: CPT | Performed by: INTERNAL MEDICINE

## 2024-06-08 PROCEDURE — 6370000000 HC RX 637 (ALT 250 FOR IP): Performed by: PHYSICAL MEDICINE & REHABILITATION

## 2024-06-08 PROCEDURE — 6360000002 HC RX W HCPCS: Performed by: PHYSICAL MEDICINE & REHABILITATION

## 2024-06-08 PROCEDURE — 80048 BASIC METABOLIC PNL TOTAL CA: CPT

## 2024-06-08 PROCEDURE — 6370000000 HC RX 637 (ALT 250 FOR IP): Performed by: INTERNAL MEDICINE

## 2024-06-08 PROCEDURE — 1180000000 HC REHAB R&B

## 2024-06-08 RX ADMIN — ACETAMINOPHEN 650 MG: 325 TABLET ORAL at 11:27

## 2024-06-08 RX ADMIN — AMLODIPINE BESYLATE 10 MG: 10 TABLET ORAL at 11:29

## 2024-06-08 RX ADMIN — DOCUSATE SODIUM 100 MG: 100 CAPSULE, LIQUID FILLED ORAL at 22:49

## 2024-06-08 RX ADMIN — DOCUSATE SODIUM 100 MG: 100 CAPSULE, LIQUID FILLED ORAL at 11:26

## 2024-06-08 RX ADMIN — SEVELAMER CARBONATE 800 MG: 800 TABLET, FILM COATED ORAL at 11:29

## 2024-06-08 RX ADMIN — BUPROPION HYDROCHLORIDE 300 MG: 300 TABLET, EXTENDED RELEASE ORAL at 22:51

## 2024-06-08 RX ADMIN — PRIMIDONE 50 MG: 50 TABLET ORAL at 11:29

## 2024-06-08 RX ADMIN — SENNOSIDES 8.6 MG: 8.6 TABLET ORAL at 22:49

## 2024-06-08 RX ADMIN — BUPROPION HYDROCHLORIDE 150 MG: 150 TABLET, EXTENDED RELEASE ORAL at 11:39

## 2024-06-08 RX ADMIN — HEPARIN SODIUM 5000 UNITS: 5000 INJECTION INTRAVENOUS; SUBCUTANEOUS at 22:50

## 2024-06-08 RX ADMIN — SERTRALINE HYDROCHLORIDE 50 MG: 50 TABLET ORAL at 11:28

## 2024-06-08 RX ADMIN — ACETAMINOPHEN 650 MG: 325 TABLET ORAL at 17:51

## 2024-06-08 RX ADMIN — SEVELAMER CARBONATE 800 MG: 800 TABLET, FILM COATED ORAL at 11:39

## 2024-06-08 RX ADMIN — ACETAMINOPHEN 650 MG: 325 TABLET ORAL at 22:48

## 2024-06-08 RX ADMIN — ACETAMINOPHEN 650 MG: 325 TABLET ORAL at 01:00

## 2024-06-08 RX ADMIN — HEPARIN SODIUM 5000 UNITS: 5000 INJECTION INTRAVENOUS; SUBCUTANEOUS at 11:41

## 2024-06-08 RX ADMIN — TRAZODONE HYDROCHLORIDE 100 MG: 100 TABLET ORAL at 22:50

## 2024-06-08 RX ADMIN — SEVELAMER CARBONATE 800 MG: 800 TABLET, FILM COATED ORAL at 17:52

## 2024-06-08 RX ADMIN — LEVETIRACETAM 500 MG: 500 TABLET, FILM COATED ORAL at 11:28

## 2024-06-08 RX ADMIN — MEGESTROL ACETATE 40 MG: 40 TABLET ORAL at 11:33

## 2024-06-08 ASSESSMENT — PAIN DESCRIPTION - DESCRIPTORS: DESCRIPTORS: DISCOMFORT

## 2024-06-08 ASSESSMENT — PAIN DESCRIPTION - LOCATION: LOCATION: HEAD

## 2024-06-08 ASSESSMENT — PAIN SCALES - GENERAL
PAINLEVEL_OUTOF10: 0
PAINLEVEL_OUTOF10: 7
PAINLEVEL_OUTOF10: 4

## 2024-06-08 ASSESSMENT — PAIN - FUNCTIONAL ASSESSMENT: PAIN_FUNCTIONAL_ASSESSMENT: ACTIVITIES ARE NOT PREVENTED

## 2024-06-08 ASSESSMENT — PAIN DESCRIPTION - ORIENTATION: ORIENTATION: ANTERIOR

## 2024-06-08 NOTE — PLAN OF CARE
Problem: Discharge Planning  Goal: Discharge to home or other facility with appropriate resources  6/8/2024 1511 by Ester Ferrara RN  Outcome: Progressing  Flowsheets (Taken 6/8/2024 0408 by Vaishnavi De La Fuente, RN)  Discharge to home or other facility with appropriate resources:   Identify barriers to discharge with patient and caregiver   Identify discharge learning needs (meds, wound care, etc)   Arrange for needed discharge resources and transportation as appropriate     Problem: Safety - Adult  Goal: Free from fall injury  6/8/2024 1513 by Ester Ferrara RN  Outcome: Progressing     Problem: ABCDS Injury Assessment  Goal: Absence of physical injury  6/8/2024 1513 by Ester Ferrara RN  Outcome: Progressing     Problem: Skin/Tissue Integrity  Goal: Absence of new skin breakdown  Description: 1.  Monitor for areas of redness and/or skin breakdown  2.  Assess vascular access sites hourly  3.  Every 4-6 hours minimum:  Change oxygen saturation probe site  4.  Every 4-6 hours:  If on nasal continuous positive airway pressure, respiratory therapy assess nares and determine need for appliance change or resting period.  6/8/2024 1513 by Ester Ferrara RN  Outcome: Progressing     Problem: Neurosensory - Adult  Goal: Absence of seizures  6/8/2024 1513 by Ester Ferrara RN  Outcome: Progressing     Problem: Skin/Tissue Integrity - Adult  Goal: Skin integrity remains intact  6/8/2024 1513 by Ester Ferrara RN  Outcome: Progressing     Problem: Musculoskeletal - Adult  Goal: Return mobility to safest level of function  6/8/2024 1513 by Ester Ferrara RN  Outcome: Progressing     Problem: Gastrointestinal - Adult  Goal: Maintains or returns to baseline bowel function  6/8/2024 1513 by Ester Ferrara RN  Outcome: Progressing     Problem: Genitourinary - Adult  Goal: Absence of urinary retention  6/8/2024 1513 by Ester Ferrara RN  Outcome: Progressing     Problem: Infection - Adult  Goal: Absence of infection at

## 2024-06-08 NOTE — PROGRESS NOTES
[facility]   Nephrology progress note  6/8/2024, 1:21 PM      Pt Name:    Deloris Watts  MRN:     [id number]     YOB: 1955  Admit Date:    5/29/2024  4:40 PM    Chief Complaint: Nephrology following for ESRD on hemodialysis.    Subjective:  Patient seen and examined  Late entry  Awake and alert  No distress    Objective:  24HR INTAKE/OUTPUT:    Intake/Output Summary (Last 24 hours) at 6/8/2024 1321  Last data filed at 6/8/2024 1035  Gross per 24 hour   Intake 1000 ml   Output 1400 ml   Net -400 ml        Admission weight: 43.5 kg (95 lb 14.4 oz)  Wt Readings from Last 3 Encounters:   06/07/24 42.8 kg (94 lb 5.7 oz)   05/29/24 43.5 kg (95 lb 14.4 oz)   05/13/24 39.9 kg (88 lb)        Vitals :   Vitals:    06/07/24 1405 06/07/24 2204 06/08/24 0045 06/08/24 1112   BP: (!) 158/65 (!) 140/64  (!) 133/52   Pulse: 94 95  95   Resp: 18 14  16   Temp: 97.9 °F (36.6 °C) 99 °F (37.2 °C) 97.8 °F (36.6 °C) 98.8 °F (37.1 °C)   TempSrc:  Oral Oral Oral   SpO2:  99%  96%   Weight: 42.8 kg (94 lb 5.7 oz)      Height:           Physical examination  General Appearance: Awake, no distress  Neck:  Supple, no JVD  Lungs: No labored breathing.  Abdomen:  Soft, non - tender  Musculoskeletal:  Edema -no edema    Medications:  Infusion:    sodium chloride       Meds:    traZODone  100 mg Oral Nightly    docusate sodium  100 mg Oral BID    amLODIPine  10 mg Oral Daily    megestrol  40 mg Oral Daily    buPROPion  150 mg Oral QAM    buPROPion  300 mg Oral Nightly    primidone  50 mg Oral Daily    sertraline  50 mg Oral Daily    sevelamer  800 mg Oral TID WC    levETIRAcetam  500 mg Oral Daily    senna  1 tablet Oral Nightly    acetaminophen  650 mg Oral Q6H    heparin (porcine)  5,000 Units SubCUTAneous 2 times per day       Lab Data :  CBC:   Recent Labs     06/07/24  0632   WBC 6.7   HGB 10.4*   HCT 34.2*          CMP:  Recent Labs     06/07/24  0632 06/08/24  0634    139   K 4.4 4.1

## 2024-06-08 NOTE — PLAN OF CARE
Problem: Discharge Planning  Goal: Discharge to home or other facility with appropriate resources  Outcome: Progressing  Flowsheets (Taken 6/8/2024 0408)  Discharge to home or other facility with appropriate resources:   Identify barriers to discharge with patient and caregiver   Identify discharge learning needs (meds, wound care, etc)   Arrange for needed discharge resources and transportation as appropriate     Problem: Safety - Adult  Goal: Free from fall injury  Outcome: Progressing  Flowsheets (Taken 6/8/2024 0408)  Free From Fall Injury: Instruct family/caregiver on patient safety. Telesitter initiated today for safety.      Problem: ABCDS Injury Assessment  Goal: Absence of physical injury  Outcome: Progressing  Flowsheets (Taken 6/7/2024 1046 by Carri Dorado LPN)  Absence of Physical Injury: Implement safety measures based on patient assessment     Problem: Neurosensory - Adult  Goal: Absence of seizures  Outcome: Progressing  Flowsheets (Taken 6/8/2024 0408)  Absence of seizures:   Monitor for seizure activity.  If seizure occurs, document type and location of movements and any associated apnea   Administer anticonvulsants as ordered   Support airway/breathing, administer oxygen as needed    Problem: Skin/Tissue Integrity - Adult  Goal: Skin integrity remains intact  Outcome: Progressing  Flowsheets (Taken 6/8/2024 0408)  Skin Integrity Remains Intact:   Monitor for areas of redness and/or skin breakdown   Assess vascular access sites hourly     Problem: Musculoskeletal - Adult  Goal: Return mobility to safest level of function  Outcome: Progressing  Flowsheets (Taken 6/8/2024 0408)  Return Mobility to Safest Level of Function:   Assess patient stability and activity tolerance for standing, transferring and ambulating with or without assistive devices   Assist with transfers and ambulation using safe patient handling equipment as needed     Problem: Gastrointestinal - Adult  Goal: Maintains or  level or baseline comfort level:   Encourage patient to monitor pain and request assistance   Assess pain using appropriate pain scale   Administer analgesics based on type and severity of pain and evaluate response   Implement non-pharmacological measures as appropriate and evaluate response

## 2024-06-09 LAB
ANION GAP SERPL CALC-SCNC: 15 MEQ/L (ref 8–16)
BUN SERPL-MCNC: 52 MG/DL (ref 7–22)
CALCIUM SERPL-MCNC: 9.2 MG/DL (ref 8.5–10.5)
CHLORIDE SERPL-SCNC: 104 MEQ/L (ref 98–111)
CO2 SERPL-SCNC: 20 MEQ/L (ref 23–33)
CREAT SERPL-MCNC: 5.6 MG/DL (ref 0.4–1.2)
GFR SERPL CREATININE-BSD FRML MDRD: 8 ML/MIN/1.73M2
GLUCOSE SERPL-MCNC: 79 MG/DL (ref 70–108)
POTASSIUM SERPL-SCNC: 4.7 MEQ/L (ref 3.5–5.2)
SODIUM SERPL-SCNC: 139 MEQ/L (ref 135–145)

## 2024-06-09 PROCEDURE — 97116 GAIT TRAINING THERAPY: CPT

## 2024-06-09 PROCEDURE — 36415 COLL VENOUS BLD VENIPUNCTURE: CPT

## 2024-06-09 PROCEDURE — 97530 THERAPEUTIC ACTIVITIES: CPT

## 2024-06-09 PROCEDURE — 6370000000 HC RX 637 (ALT 250 FOR IP): Performed by: INTERNAL MEDICINE

## 2024-06-09 PROCEDURE — 97110 THERAPEUTIC EXERCISES: CPT

## 2024-06-09 PROCEDURE — 6360000002 HC RX W HCPCS: Performed by: PHYSICAL MEDICINE & REHABILITATION

## 2024-06-09 PROCEDURE — 80048 BASIC METABOLIC PNL TOTAL CA: CPT

## 2024-06-09 PROCEDURE — 1180000000 HC REHAB R&B

## 2024-06-09 PROCEDURE — 99232 SBSQ HOSP IP/OBS MODERATE 35: CPT | Performed by: INTERNAL MEDICINE

## 2024-06-09 PROCEDURE — 6370000000 HC RX 637 (ALT 250 FOR IP): Performed by: PHYSICAL MEDICINE & REHABILITATION

## 2024-06-09 RX ADMIN — ACETAMINOPHEN 650 MG: 325 TABLET ORAL at 23:42

## 2024-06-09 RX ADMIN — BUPROPION HYDROCHLORIDE 150 MG: 150 TABLET, EXTENDED RELEASE ORAL at 07:18

## 2024-06-09 RX ADMIN — MEGESTROL ACETATE 40 MG: 40 TABLET ORAL at 07:18

## 2024-06-09 RX ADMIN — TRAZODONE HYDROCHLORIDE 100 MG: 100 TABLET ORAL at 23:42

## 2024-06-09 RX ADMIN — SERTRALINE HYDROCHLORIDE 50 MG: 50 TABLET ORAL at 07:18

## 2024-06-09 RX ADMIN — SEVELAMER CARBONATE 800 MG: 800 TABLET, FILM COATED ORAL at 07:18

## 2024-06-09 RX ADMIN — LEVETIRACETAM 500 MG: 500 TABLET, FILM COATED ORAL at 07:18

## 2024-06-09 RX ADMIN — DOCUSATE SODIUM 100 MG: 100 CAPSULE, LIQUID FILLED ORAL at 23:42

## 2024-06-09 RX ADMIN — BUPROPION HYDROCHLORIDE 300 MG: 300 TABLET, EXTENDED RELEASE ORAL at 23:43

## 2024-06-09 RX ADMIN — PRIMIDONE 50 MG: 50 TABLET ORAL at 07:18

## 2024-06-09 RX ADMIN — ACETAMINOPHEN 650 MG: 325 TABLET ORAL at 11:38

## 2024-06-09 RX ADMIN — DOCUSATE SODIUM 100 MG: 100 CAPSULE, LIQUID FILLED ORAL at 07:19

## 2024-06-09 RX ADMIN — MAGNESIUM HYDROXIDE 15 ML: 1200 LIQUID ORAL at 05:11

## 2024-06-09 RX ADMIN — SENNOSIDES 8.6 MG: 8.6 TABLET ORAL at 23:43

## 2024-06-09 RX ADMIN — ACETAMINOPHEN 650 MG: 325 TABLET ORAL at 16:55

## 2024-06-09 RX ADMIN — HEPARIN SODIUM 5000 UNITS: 5000 INJECTION INTRAVENOUS; SUBCUTANEOUS at 23:43

## 2024-06-09 RX ADMIN — AMLODIPINE BESYLATE 10 MG: 10 TABLET ORAL at 07:18

## 2024-06-09 RX ADMIN — ACETAMINOPHEN 650 MG: 325 TABLET ORAL at 05:09

## 2024-06-09 RX ADMIN — SEVELAMER CARBONATE 800 MG: 800 TABLET, FILM COATED ORAL at 11:38

## 2024-06-09 RX ADMIN — SEVELAMER CARBONATE 800 MG: 800 TABLET, FILM COATED ORAL at 16:55

## 2024-06-09 RX ADMIN — HEPARIN SODIUM 5000 UNITS: 5000 INJECTION INTRAVENOUS; SUBCUTANEOUS at 07:19

## 2024-06-09 ASSESSMENT — PAIN DESCRIPTION - LOCATION
LOCATION: THROAT
LOCATION: ABDOMEN
LOCATION: OTHER (COMMENT)

## 2024-06-09 ASSESSMENT — PAIN - FUNCTIONAL ASSESSMENT
PAIN_FUNCTIONAL_ASSESSMENT: ACTIVITIES ARE NOT PREVENTED

## 2024-06-09 ASSESSMENT — PAIN DESCRIPTION - DESCRIPTORS
DESCRIPTORS: OTHER (COMMENT)
DESCRIPTORS: SORE
DESCRIPTORS: CRAMPING

## 2024-06-09 ASSESSMENT — PAIN DESCRIPTION - ORIENTATION
ORIENTATION: MID
ORIENTATION: MID
ORIENTATION: OTHER (COMMENT)

## 2024-06-09 ASSESSMENT — PAIN SCALES - GENERAL
PAINLEVEL_OUTOF10: 2
PAINLEVEL_OUTOF10: 2
PAINLEVEL_OUTOF10: 0
PAINLEVEL_OUTOF10: 0

## 2024-06-09 NOTE — PROGRESS NOTES
Physical Medicine & Rehabilitation Progress Note    Chief Complaint:  Fall resulting subdural hematoma     Subjective:    Deloris Watts is a 68 y.o.  right-handed slim  female with history of hypertension, hyperlipidemia, hydronephrosis, fibromyalgia, irritable bowel syndrome, essential tremor, osteoporosis, end-stage renal disease on hemodialysis (MWF) since January 2023, stroke with left side weakness in 1990s, anemia, depression, anxiety, right wrist and right ankle fracture treated conservatively, right proximal humeral fracture due to fall requiring ORIF, status post right carpal tunnel release surgery, status post cervical spine surgery, status post hysterectomy and bilateral oophorectomy, hemorrhoidectomy, sinus surgery, tubal ligation, L2 compression fracture requiring kyphoplasty, LASEK surgery, left femur neck displaced fracture due to fall on 3/10/2024 requiring left hip hemiarthroplasty on 3/11/2024, was admitted on 5/29/2024 for intensive inpatient management of impairment & disability secondary to fall resulting acute left parafalcine subdural hematoma.     The patient is known to inpatient rehab service.  She was previously in our inpatient rehab service from 12/21/2023 to 1/5/2024 after a fall accident on 12/14/2023 resulting L2 compression fracture requiring kyphoplasty, and liver laceration.  She was discharged to home on 1/5/2024 with referral for outpatient PT, OT and speech therapy.  She again sustained a fall accident at home bathroom on 3/10/2024 resulting left femur neck displaced fracture requiring left hip hemiarthroplasty done on 3/11/2024.  She then was admitted to inpatient rehab service again on 3/14/2024.  She was discharged home on 3/30/2024 with referral for outpatient PT and OT treatment after discharge.     The patient's  says he assisted the patient to sit on her rollator walker seat in living room and then pushing the rollator walker to bring the patient back  suggestion of a sinus headache.  I will put patient on 10-day course of 5 daily Zyrtec and normal saline nasal spray for the sinus headache.  The patient also feels slightly more tired and fatigue.  She is scheduled to have hemodialysis today.  She tolerated the rehab treatment provided over the weekend except physical therapy.  Her function has not improved much.    The patient currently is projected to be ready for discharge on 6/15/2024.      Plan:  Continue intensive PT/OT/SLP/RT inpatient rehabilitation program at least 3 hours per day, 5 days per week in order to improve functional status prior to discharge.  Family education and training will be completed.  Equipment evaluations and recommendations will be completed as appropriate.       Rehabilitation nursing continue to be involved for bowel, bladder, skin, and pain management.  Nursing will also provide education and training to patient and family.    Prophylaxis:  DVT: Lovenox, ACE stocking, intermittent pneumatic compression device.  GI: Colace, Senokot, Dulcolax suppository as needed, GlycoLax as needed, milk of magnesia as needed, Zofran as needed, Linzess as needed  Pain: Tylenol, Tylenol as needed  Start Zyrtec 5 mg daily and normal saline nasal spray every 4 hours while awake for 10 days for sinus headache/sinus congestion  Continue Norvasc, hydralazine as needed for hypertension  Continue Lipitor for hyperlipidemia  Continue Wellbutrin XL, Zoloft for depression  Continue Mysoline for essential tremor  Continue Megace for poor appetite  Continue sevelamer for hypocalcemia prevention in renal patient   Continue Keppra for posttraumatic seizure prophylaxis  Continue Flonase for allergic rhinitis; restart home Singulair  Continue trazodone for sleep/insomnia  Continue hemodialysis Monday, Wednesday and Friday as per nephrology service direction  Nutrition: Continue regular diet  Bladder: Monitoring signs or symptoms of UTI  Bowel: Monitoring signs or

## 2024-06-09 NOTE — PROGRESS NOTES
Pt perseverating on earlier conversation w/ MD and worried about head. Pt requesting to contact [facility]. Pt not agreeable w/ therapy because of everything going on with dialysis and head. Pt reporting having a headache during conversation w/ PTA. Pt then agreeable w/ going to gym but requesting to use restroom first. Pt had massive loose BM during ambulation to q requiring intense clean up. Pt refusing therapy and wanting to return to bed, Pt worried about what the MD has to say during conversation tomorrow. Pt declining gym and wanted to return to bed. Pt offered bed level therapy then reporting stomachache and inability to perform therapy in bed. Pt encouraged and performed supine therex. Pt's nurse notified of refusals, BM, headache and stomachache.

## 2024-06-09 NOTE — PROGRESS NOTES
[facility]   Nephrology progress note  6/9/2024, 1:04 PM      Pt Name:    Deloris Watts  MRN:     [id number]     YOB: 1955  Admit Date:    5/29/2024  4:40 PM    Chief Complaint: Nephrology following for ESRD on hemodialysis.    Subjective:  Patient seen and examined this morning  Late entry  No distress    Objective:  24HR INTAKE/OUTPUT:    Intake/Output Summary (Last 24 hours) at 6/9/2024 1304  Last data filed at 6/9/2024 0851  Gross per 24 hour   Intake 420 ml   Output --   Net 420 ml        Admission weight: 43.5 kg (95 lb 14.4 oz)  Wt Readings from Last 3 Encounters:   06/09/24 42.9 kg (94 lb 8 oz)   05/29/24 43.5 kg (95 lb 14.4 oz)   05/13/24 39.9 kg (88 lb)        Vitals :   Vitals:    06/08/24 1112 06/08/24 2230 06/09/24 0545 06/09/24 0715   BP: (!) 133/52 (!) 152/69  (!) 153/72   Pulse: 95 98  93   Resp: 16 16  16   Temp: 98.8 °F (37.1 °C) 97.5 °F (36.4 °C)  98.8 °F (37.1 °C)   TempSrc: Oral Oral  Oral   SpO2: 96% 99%  99%   Weight:   42.9 kg (94 lb 8 oz)    Height:   1.524 m (5')        Physical examination  General Appearance: no distress  Neck:  Supple, no JVD  Lungs: No labored breathing.  Abdomen:  Soft, non - tender  Musculoskeletal:  Edema -no edema    Medications:  Infusion:    sodium chloride       Meds:    traZODone  100 mg Oral Nightly    docusate sodium  100 mg Oral BID    amLODIPine  10 mg Oral Daily    megestrol  40 mg Oral Daily    buPROPion  150 mg Oral QAM    buPROPion  300 mg Oral Nightly    primidone  50 mg Oral Daily    sertraline  50 mg Oral Daily    sevelamer  800 mg Oral TID WC    levETIRAcetam  500 mg Oral Daily    senna  1 tablet Oral Nightly    acetaminophen  650 mg Oral Q6H    heparin (porcine)  5,000 Units SubCUTAneous 2 times per day       Lab Data :  CBC:   Recent Labs     06/07/24  0632   WBC 6.7   HGB 10.4*   HCT 34.2*          CMP:  Recent Labs     06/07/24  0632 06/08/24  0634 06/09/24  0700    139 139   K 4.4 4.1 4.7   CL

## 2024-06-09 NOTE — PLAN OF CARE
Problem: Safety - Adult  Goal: Free from fall injury  Outcome: Progressing  Flowsheets (Taken 6/9/2024 1822)  Free From Fall Injury: Instruct family/caregiver on patient safety     Problem: ABCDS Injury Assessment  Goal: Absence of physical injury  Outcome: Progressing  Flowsheets (Taken 6/9/2024 1822)  Absence of Physical Injury: Implement safety measures based on patient assessment     Problem: Neurosensory - Adult  Goal: Absence of seizures  Outcome: Progressing  Flowsheets (Taken 6/9/2024 1822)  Absence of seizures:   Monitor for seizure activity.  If seizure occurs, document type and location of movements and any associated apnea   Administer anticonvulsants as ordered     Problem: Musculoskeletal - Adult  Goal: Return mobility to safest level of function  Outcome: Progressing  Flowsheets (Taken 6/9/2024 1822)  Return Mobility to Safest Level of Function:   Assess patient stability and activity tolerance for standing, transferring and ambulating with or without assistive devices   Assist with transfers and ambulation using safe patient handling equipment as needed

## 2024-06-09 NOTE — PROGRESS NOTES
[facility]  INPATIENT PHYSICAL THERAPY  DAILY NOTE  [facility] - 8K-13/013-A    Time In: 1000  Time Out: 1100  Timed Code Treatment Minutes: 60 Minutes  Minutes: 60     Minute Variance  Variance: -30  Reason: Illness    Date: 2024  Patient Name: Deloris Watts,  Gender:  female        MRN: [id number]  : 1955  (68 y.o.)  Referral Date : 24  Referring Practitioner: Jax Whatley MD  Diagnosis: Subdural hematoma, acute (HCC)  Additional Pertinent Hx: Per H&P 24: Deloris Watts is a 68 y.o. right-handed  female with history of hypertension, osteoporosis, end-stage renal disease on hemodialysis (MWF) since 2023, stroke with left side weakness in , anemia, depression, anxiety, left hip hemiarthroplasty on 3/11/2024, and disability secondary to fall resulting acute left parafalcine subdural hematoma. The patient's  says he assisted the patient to sit on her rollator walker seat in living room and then pushing the rollator walker to bring the patient back to bedroom on 2024.  When they were near the bedroom door on the hallway, the patient suddenly slipped off the rollator walker seat and fell down to the floor with her head hitting the carpeted floor of the bedroom entrance.  She did not loss consciousness but felt some headache afterwards. CT of the head done on 2024 revealed small acute left parafalcine subdural hematoma.  Cervical spine CT on 2024 revealed no cervical spine injury.  Neurosurgery was consulted on 2024 for the acute subdural hematoma. A dose of tranexamic acid (TXA) was given.  No acute neurosurgical intervention was advised.  Neurosurgery note stated that Lovenox can be started on 2024 for DVT prophylaxis.  CT of head was repeated later on 2024 and showed stable CT scan of brain without interval change compared to previous study.     Prior Level of Function:  Lives With:

## 2024-06-09 NOTE — PROGRESS NOTES
[facility]  Occupational Therapy  Daily Note  Time:   Time In: 734  Time Out: 08  Timed Code Treatment Minutes: 31 Minutes  Minutes: 31          Date: 2024  Patient Name: Deloris Watts,   Gender: female      Room: [facility]13/013  MRN: [id number]  : 1955  (68 y.o.)  Referring Practitioner: Dr. ZULEMA Whatley  Diagnosis: Subdural hematoma  Additional Pertinent Hx: Deloris Watts is a 68 y.o.  right-handed slim  female with history of hypertension, fibromyalgia, irritable bowel syndrome, essential tremor, end-stage renal disease on hemodialysis (MWF) since 2023, stroke with left side weakness in , depression, anxiety, right wrist and right ankle fracture treated conservatively, right proximal humeral fracture due to fall requiring ORIF, L2 compression fracture requiring kyphoplasty, LASEK surgery, left femur neck displaced fracture due to fall on 3/10/2024 requiring left hip hemiarthroplasty on 3/11/2024, was admitted on 2024 for intensive inpatient management of impairment & disability secondary to fall resulting acute left parafalcine subdural hematoma. Pt sustained a fall at home and brought to the hospital. CT of the head done on 2024 revealed small acute left parafalcine subdural hematoma.  Neurosurgery was consulted on 2024 for the acute subdural hematoma. A dose of tranexamic acid (TXA) was given.  No acute neurosurgical intervention was advised. Pt admitted to [facility] to regain strength, endurance and balance to improve indep with self care before returning home with spouse.    Restrictions/Precautions:  Restrictions/Precautions: Fall Risk, General Precautions, Seizure, Bed Alarm  Position Activity Restriction  Other position/activity restrictions: History of L hemiarthroplasty 3/11/24.  Perfect serve to Dr. Lezama sent  to clarify if patient continues with hip precautions- Per Dr. Lezama, no YONG precautions.  improve UB strength to 4+/5 to improve global strength needed for ADL performance.    Following session, patient left in safe position with all fall risk precautions in place.

## 2024-06-10 LAB
ANION GAP SERPL CALC-SCNC: 15 MEQ/L (ref 8–16)
BUN SERPL-MCNC: 62 MG/DL (ref 7–22)
CALCIUM SERPL-MCNC: 8.8 MG/DL (ref 8.5–10.5)
CHLORIDE SERPL-SCNC: 105 MEQ/L (ref 98–111)
CO2 SERPL-SCNC: 17 MEQ/L (ref 23–33)
CREAT SERPL-MCNC: 6.1 MG/DL (ref 0.4–1.2)
GFR SERPL CREATININE-BSD FRML MDRD: 7 ML/MIN/1.73M2
GLUCOSE BLD STRIP.AUTO-MCNC: 112 MG/DL (ref 70–108)
GLUCOSE SERPL-MCNC: 77 MG/DL (ref 70–108)
POTASSIUM SERPL-SCNC: 5.3 MEQ/L (ref 3.5–5.2)
SODIUM SERPL-SCNC: 137 MEQ/L (ref 135–145)

## 2024-06-10 PROCEDURE — 82948 REAGENT STRIP/BLOOD GLUCOSE: CPT

## 2024-06-10 PROCEDURE — 97116 GAIT TRAINING THERAPY: CPT

## 2024-06-10 PROCEDURE — 99232 SBSQ HOSP IP/OBS MODERATE 35: CPT | Performed by: PHYSICAL MEDICINE & REHABILITATION

## 2024-06-10 PROCEDURE — 97110 THERAPEUTIC EXERCISES: CPT

## 2024-06-10 PROCEDURE — 90935 HEMODIALYSIS ONE EVALUATION: CPT

## 2024-06-10 PROCEDURE — 99232 SBSQ HOSP IP/OBS MODERATE 35: CPT | Performed by: INTERNAL MEDICINE

## 2024-06-10 PROCEDURE — 36415 COLL VENOUS BLD VENIPUNCTURE: CPT

## 2024-06-10 PROCEDURE — 80048 BASIC METABOLIC PNL TOTAL CA: CPT

## 2024-06-10 PROCEDURE — 97535 SELF CARE MNGMENT TRAINING: CPT

## 2024-06-10 PROCEDURE — 6370000000 HC RX 637 (ALT 250 FOR IP): Performed by: PHYSICAL MEDICINE & REHABILITATION

## 2024-06-10 PROCEDURE — 97530 THERAPEUTIC ACTIVITIES: CPT

## 2024-06-10 PROCEDURE — 6370000000 HC RX 637 (ALT 250 FOR IP): Performed by: INTERNAL MEDICINE

## 2024-06-10 PROCEDURE — 6360000002 HC RX W HCPCS: Performed by: PHYSICAL MEDICINE & REHABILITATION

## 2024-06-10 PROCEDURE — 1180000000 HC REHAB R&B

## 2024-06-10 RX ORDER — CETIRIZINE HYDROCHLORIDE 5 MG/1
5 TABLET ORAL DAILY
Status: DISCONTINUED | OUTPATIENT
Start: 2024-06-10 | End: 2024-06-15 | Stop reason: HOSPADM

## 2024-06-10 RX ADMIN — SALINE NASAL SPRAY 1 SPRAY: 1.5 SOLUTION NASAL at 11:26

## 2024-06-10 RX ADMIN — LEVETIRACETAM 500 MG: 500 TABLET, FILM COATED ORAL at 10:53

## 2024-06-10 RX ADMIN — MEGESTROL ACETATE 40 MG: 40 TABLET ORAL at 10:54

## 2024-06-10 RX ADMIN — SEVELAMER CARBONATE 800 MG: 800 TABLET, FILM COATED ORAL at 16:57

## 2024-06-10 RX ADMIN — PRIMIDONE 50 MG: 50 TABLET ORAL at 10:53

## 2024-06-10 RX ADMIN — ACETAMINOPHEN 650 MG: 325 TABLET ORAL at 22:45

## 2024-06-10 RX ADMIN — ACETAMINOPHEN 650 MG: 325 TABLET ORAL at 16:57

## 2024-06-10 RX ADMIN — BUPROPION HYDROCHLORIDE 150 MG: 150 TABLET, EXTENDED RELEASE ORAL at 10:54

## 2024-06-10 RX ADMIN — DOCUSATE SODIUM 100 MG: 100 CAPSULE, LIQUID FILLED ORAL at 10:54

## 2024-06-10 RX ADMIN — SEVELAMER CARBONATE 800 MG: 800 TABLET, FILM COATED ORAL at 10:53

## 2024-06-10 RX ADMIN — HEPARIN SODIUM 5000 UNITS: 5000 INJECTION INTRAVENOUS; SUBCUTANEOUS at 10:55

## 2024-06-10 RX ADMIN — AMLODIPINE BESYLATE 10 MG: 10 TABLET ORAL at 10:53

## 2024-06-10 RX ADMIN — HEPARIN SODIUM 5000 UNITS: 5000 INJECTION INTRAVENOUS; SUBCUTANEOUS at 22:46

## 2024-06-10 RX ADMIN — ACETAMINOPHEN 650 MG: 325 TABLET ORAL at 10:54

## 2024-06-10 RX ADMIN — SERTRALINE HYDROCHLORIDE 50 MG: 50 TABLET ORAL at 10:54

## 2024-06-10 RX ADMIN — ACETAMINOPHEN 650 MG: 325 TABLET ORAL at 06:15

## 2024-06-10 RX ADMIN — TRAZODONE HYDROCHLORIDE 100 MG: 100 TABLET ORAL at 22:45

## 2024-06-10 RX ADMIN — BUPROPION HYDROCHLORIDE 300 MG: 300 TABLET, EXTENDED RELEASE ORAL at 22:46

## 2024-06-10 RX ADMIN — CETIRIZINE HYDROCHLORIDE 5 MG: 5 TABLET ORAL at 11:26

## 2024-06-10 RX ADMIN — SALINE NASAL SPRAY 1 SPRAY: 1.5 SOLUTION NASAL at 16:57

## 2024-06-10 ASSESSMENT — ENCOUNTER SYMPTOMS
BACK PAIN: 0
NAUSEA: 0
SHORTNESS OF BREATH: 0
CONSTIPATION: 0
WHEEZING: 0
RHINORRHEA: 1
VOMITING: 0
TROUBLE SWALLOWING: 0
ABDOMINAL PAIN: 0
DIARRHEA: 0

## 2024-06-10 ASSESSMENT — PAIN SCALES - GENERAL
PAINLEVEL_OUTOF10: 0
PAINLEVEL_OUTOF10: 0

## 2024-06-10 NOTE — PROGRESS NOTES
Patient: Deloris Watts  Unit/Bed: 8K-13/013-A  YOB: 1955  MRN: [id number] Acct: [id number]   Admitting Diagnosis: Subdural hematoma (HCC) [S06.5XAA]  Admit Date:  5/29/2024  Hospital Day: 12    Assessment:     Principal Problem:    Subdural hematoma, acute (HCC)  Active Problems:    Anemia due to chronic kidney disease, on chronic dialysis (HCC)    Depression    Allergic rhinitis    Essential hypertension    Chronic kidney disease with in-center hemodialysis preferred by patient    Essential tremor    Cerebral concussion    Impaired cognition    Severe malnutrition (HCC)  Resolved Problems:    * No resolved hospital problems. *      Plan:     CS looks well        Subjective:     Patient has no complaint of CP or SOB..   Medication side effects: none    Scheduled Meds:   sodium chloride  1 spray Each Nostril Q4H While awake    cetirizine  5 mg Oral Daily    traZODone  100 mg Oral Nightly    docusate sodium  100 mg Oral BID    amLODIPine  10 mg Oral Daily    megestrol  40 mg Oral Daily    buPROPion  150 mg Oral QAM    buPROPion  300 mg Oral Nightly    primidone  50 mg Oral Daily    sertraline  50 mg Oral Daily    sevelamer  800 mg Oral TID WC    levETIRAcetam  500 mg Oral Daily    senna  1 tablet Oral Nightly    acetaminophen  650 mg Oral Q6H    heparin (porcine)  5,000 Units SubCUTAneous 2 times per day     Continuous Infusions:   sodium chloride       PRN Meds:loperamide, linaclotide, bisacodyl, ondansetron, sodium chloride flush, sodium chloride, acetaminophen, hydrALAZINE, magnesium hydroxide, polyethylene glycol    Review of Systems  Pertinent items are noted in HPI.    Objective:     Patient Vitals for the past 8 hrs:   BP Temp Temp src Pulse Resp SpO2 Weight   06/10/24 1523 136/88 97.1 °F (36.2 °C) -- 90 14 100 % 44.6 kg (98 lb 5.2 oz)   06/10/24 1220 120/67 97.3 °F (36.3 °C) -- 93 12 99 % 45.6 kg (100 lb 8.5 oz)   06/10/24 1045 (!) 149/68 98.1 °F (36.7 °C) Oral 89 18 100 % --     I/O last 3

## 2024-06-10 NOTE — PLAN OF CARE
Problem: Discharge Planning  Goal: Discharge to home or other facility with appropriate resources  6/10/2024 1527 by Kusum Guajardo LISW  Note: VARSHA left a message for [facility] wit ha referral for RN, PT, OT and HHA.    VARSHA spoke with Jasmine at [facility] to update them on patient's discharge date of Saturday, 6/15. Patient's chair time is 2:25 PM.    SW will follow and maintain involvement in discharge planning.

## 2024-06-10 NOTE — FLOWSHEET NOTE
06/10/24 1523   Vital Signs   /88   Temp 97.1 °F (36.2 °C)   Pulse 90   Respirations 14   SpO2 100 %   Weight - Scale 44.6 kg (98 lb 5.2 oz)   Pain Assessment   Pain Assessment None - Denies Pain   Post-Hemodialysis Assessment   Post-Treatment Procedures Blood returned;Catheter Capped, clamped with Saline x2 ports   Machine Disinfection Process Acid/Vinegar Clean;Heat Disinfect;Exterior Machine Disinfection   Rinseback Volume (ml) 400 ml   Blood Volume Processed (Liters) 56.9 L   Dialyzer Clearance Lightly streaked   Duration of Treatment (minutes) 150 minutes   Hemodialysis Output (ml) 1000 ml   Tolerated Treatment Good     completed 2.5 hr hd TX and removed 1 Liter of fluid. pt tolerated HD TX well. Dialysis TX ended all blood returned with 400 mls rinse back, CVC dressing changed and is clean, dry and intact. report given to primary nurse, record completed and printed to be scanned into pt's EMR.

## 2024-06-10 NOTE — PROGRESS NOTES
require up to min A for standing balance and mobility within her daily routines. Deloris requires min A for UB ADLs and progressed from max A to min A for LB ADLs. She requires up to min A  toileting routines at times. Deloris has supportive spouse for IADLs but Deloris can engage into simple, rote IADLs with CGA for balance and mod cues for initiation and attention, recall.  Deloris is limited by her strength, endurance, activity tolerance and cont to require skilled OT to improve safety at home.  Discharge Recommendations: Patient would benefit from continued therapy after discharge  Equipment Recommendations: Other: Pt has a shower chair in her tub/shower; grab bars, reacher and lift chair. Continue to assess further needs.  Plan: Times Per Week: 5x/wk for 90 min  Current Treatment Recommendations: Strengthening, Patient/Caregiver education & training, Balance training, Functional mobility training, Self-Care / ADL, Endurance training, Equipment evaluation, education, & procurement, Safety education & training, Gait training, Neuromuscular re-education    Education:  Learners: Patient  Role of OT, Plan of Care, ADL's, Home Exercise Program, Reviewed Prior Education, Home Safety, Importance of Increasing Activity, Fall Prevention, and Assistive Device Safety    Goals  Short Term Goals  Time Frame for Short Term Goals: 1 week  Short Term Goal 1: Pt will demo improved standing tolerance up to 5 min SBA with 1-2 hand release to maximize performance with ADLs. GOAL NOT MET, CONT.   Short Term Goal 2: Pt will sequence and don UB clothing given SBA for task and sitting balance to maximize performace with ADLs. GOAL NOT MET, CONT.   Short Term Goal 3: Pt will complete LB dressing tasks with min assist using AE prn to improve indep with daily dressing tasks.GOAL  MET, REVISE   Short Term Goal 4: Pt will alighn self to surface (toilet, chair, wheelchair) with SBA and min vcs for positioning to decrease fall risk during transfers.GOAL  NOT MET, CONT.   Short Term Goal 5: Pt will improve sitting balance as evidenced by engaging in seated tasks for 10 minutes with SBA and no cues to reposition to midline to enhance performance with seated ADLs.  Long Term Goals  Time Frame for Long Term Goals : 3 weeks from OT evaluation  Long Term Goal 1: Pt will complete tub/shower transfers with CGA using least restrictive AD to return to completing sponge baths in shower at home.GOAL NOT MET, CONT.   Long Term Goal 2: Pt will sequence/ problem solve bathing and dressing tasks with overall SBA to improve indep with daily dressing tasks.GOAL NOT MET, CONT.   Long Term Goal 3: Pt will navigate RW to/ from bathroom with SBA to enhance performace with bathroom related tasks.GOAL NOT MET, CONT.   Long Term Goal 4: Pt will improve UB strength to 4+/5 to improve global strength needed for ADL performance.GOAL NOT MET, CONT.     Revised Short Term Goals  Time Frame for Short Term Goals: 1 week  Short Term Goal 1: Pt will demo improved standing tolerance up to 5 min SBA with 1-2 hand release to maximize performance with ADLs.  Short Term Goal 2: Pt will sequence and don UB clothing given SBA for task and sitting balance to maximize performace with ADLs.  Short Term Goal 3: Pt will complete LB dressing tasks with CGA using AE prn to improve indep with daily dressing tasks.  Short Term Goal 4: Pt will alighn self to surface (toilet, chair, wheelchair) with SBA and min vcs for positioning to decrease fall risk during transfers.  Short Term Goal 5: Pt will improve sitting balance as evidenced by engaging in seated tasks for 10 minutes with SBA and no cues to reposition to midline to enhance performance with seated ADLs.  Long Term Goals  Time Frame for Long Term Goals : 3 weeks from OT evaluation  Long Term Goal 1: Pt will complete tub/shower transfers with CGA using least restrictive AD to return to completing sponge baths in shower at home.  Long Term Goal 2: Pt will

## 2024-06-10 NOTE — PROGRESS NOTES
[facility]   Nephrology progress note  6/10/2024, 11:07 AM      Pt Name:    Deloris Watts  MRN:     [id number]     YOB: 1955  Admit Date:    5/29/2024  4:40 PM    Chief Complaint: Nephrology following for ESRD on hemodialysis.    Subjective:  Patient seen and examined  Undergoing rehab, tolerating well  Some slight on and off confusion    Objective:  24HR INTAKE/OUTPUT:    Intake/Output Summary (Last 24 hours) at 6/10/2024 1107  Last data filed at 6/9/2024 1325  Gross per 24 hour   Intake 80 ml   Output --   Net 80 ml        Admission weight: 43.5 kg (95 lb 14.4 oz)  Wt Readings from Last 3 Encounters:   06/10/24 42.8 kg (94 lb 5.7 oz)   05/29/24 43.5 kg (95 lb 14.4 oz)   05/13/24 39.9 kg (88 lb)        Vitals :   Vitals:    06/10/24 0330 06/10/24 0714 06/10/24 0928 06/10/24 1045   BP:  (!) 150/72 (!) 155/70 (!) 149/68   Pulse:  95 88 89   Resp:    18   Temp:       TempSrc:    Oral   SpO2:    100%   Weight: 42.8 kg (94 lb 5.7 oz)      Height: 1.524 m (5')          Physical examination  General Appearance:  Well developed. No distress  Mouth/Throat:  Oral mucosa moist  Neck:  Supple, no JVD  Lungs:  Breath sounds: clear  Heart::  S1,S2 heard  Abdomen:  Soft, non - tender  Musculoskeletal:  Edema -no edema    Medications:  Infusion:    sodium chloride       Meds:    sodium chloride  1 spray Each Nostril Q4H While awake    cetirizine  5 mg Oral Daily    traZODone  100 mg Oral Nightly    docusate sodium  100 mg Oral BID    amLODIPine  10 mg Oral Daily    megestrol  40 mg Oral Daily    buPROPion  150 mg Oral QAM    buPROPion  300 mg Oral Nightly    primidone  50 mg Oral Daily    sertraline  50 mg Oral Daily    sevelamer  800 mg Oral TID WC    levETIRAcetam  500 mg Oral Daily    senna  1 tablet Oral Nightly    acetaminophen  650 mg Oral Q6H    heparin (porcine)  5,000 Units SubCUTAneous 2 times per day       Lab Data :  CBC:   No results for input(s): \"WBC\", \"HGB\", \"HCT\", \"PLT\" in the

## 2024-06-10 NOTE — PLAN OF CARE
Problem: Discharge Planning  Goal: Discharge to home or other facility with appropriate resources  Outcome: Progressing  Flowsheets (Taken 6/10/2024 1000)  Discharge to home or other facility with appropriate resources:   Identify barriers to discharge with patient and caregiver   Identify discharge learning needs (meds, wound care, etc)     Problem: Safety - Adult  Goal: Free from fall injury  Outcome: Progressing  Flowsheets (Taken 6/10/2024 1253)  Free From Fall Injury: Instruct family/caregiver on patient safety     Problem: ABCDS Injury Assessment  Goal: Absence of physical injury  Outcome: Progressing  Flowsheets (Taken 6/10/2024 1253)  Absence of Physical Injury: Implement safety measures based on patient assessment     Problem: Skin/Tissue Integrity  Goal: Absence of new skin breakdown  Description: 1.  Monitor for areas of redness and/or skin breakdown  2.  Assess vascular access sites hourly  3.  Every 4-6 hours minimum:  Change oxygen saturation probe site  4.  Every 4-6 hours:  If on nasal continuous positive airway pressure, respiratory therapy assess nares and determine need for appliance change or resting period.  Outcome: Progressing     Problem: Neurosensory - Adult  Goal: Absence of seizures  Outcome: Progressing  Flowsheets (Taken 6/10/2024 1000)  Absence of seizures:   Monitor for seizure activity.  If seizure occurs, document type and location of movements and any associated apnea   Administer anticonvulsants as ordered   If seizure occurs, turn head to side and suction secretions as needed   Support airway/breathing, administer oxygen as needed   Diagnostic studies as ordered     Problem: Skin/Tissue Integrity - Adult  Goal: Skin integrity remains intact  Outcome: Progressing  Flowsheets (Taken 6/10/2024 1000)  Skin Integrity Remains Intact:   Assess vascular access sites hourly   Monitor for areas of redness and/or skin breakdown     Problem: Musculoskeletal - Adult  Goal: Return mobility to  safest level of function  Outcome: Progressing  Flowsheets (Taken 6/10/2024 1000)  Return Mobility to Safest Level of Function:   Assess patient stability and activity tolerance for standing, transferring and ambulating with or without assistive devices   Assist with transfers and ambulation using safe patient handling equipment as needed   Ensure adequate protection for wounds/incisions during mobilization   Apply continuous passive motion per provider or physical therapy orders to increase flexion toward goal   Instruct patient/family in ordered activity level     Problem: Gastrointestinal - Adult  Goal: Maintains or returns to baseline bowel function  Outcome: Progressing  Flowsheets (Taken 6/10/2024 1000)  Maintains or returns to baseline bowel function:   Assess bowel function   Encourage oral fluids to ensure adequate hydration   Encourage mobilization and activity   Administer ordered medications as needed     Problem: Genitourinary - Adult  Goal: Absence of urinary retention  Outcome: Progressing  Flowsheets (Taken 6/10/2024 1000)  Absence of urinary retention:   Assess patient’s ability to void and empty bladder   Monitor intake/output and perform bladder scan as needed     Problem: Infection - Adult  Goal: Absence of infection at discharge  Outcome: Progressing  Flowsheets (Taken 6/10/2024 1000)  Absence of infection at discharge:   Assess and monitor for signs and symptoms of infection   Monitor lab/diagnostic results   Monitor all insertion sites i.e., indwelling lines, tubes and drains   Instruct and encourage patient and family to use good hand hygiene technique   Administer medications as ordered     Problem: Infection - Adult  Goal: Absence of infection during hospitalization  Outcome: Progressing  Flowsheets (Taken 6/10/2024 1000)  Absence of infection during hospitalization:   Assess and monitor for signs and symptoms of infection   Monitor lab/diagnostic results   Monitor all insertion sites i.e.,

## 2024-06-10 NOTE — DISCHARGE INSTR - COC
Continuity of Care Form    Patient Name: Deloris Watts   :  1955  MRN:  [id number]    Admit date:  2024  Discharge date:  ***    Code Status Order: Full Code   Advance Directives:     Admitting Physician:  Jax Whatley MD  PCP: Johana Weldon MD    Discharging Nurse: ***  Discharging Hospital Unit/Room#: 8K-13/013-A  Discharging Unit Phone Number: ***    Emergency Contact:   Extended Emergency Contact Information  Primary Emergency Contact: Tenzin Watts  Address: [address] [place] [id number] [facility]  Home Phone: [phone]  Mobile Phone: [phone]  Relation: Spouse   needed? No  Secondary Emergency Contact: Layla Rose   [facility]  Home Phone: [phone]  Mobile Phone: [phone]  Relation: Child   needed? No    Past Surgical History:  Past Surgical History:   Procedure Laterality Date    CARPAL TUNNEL RELEASE Right     in     COLONOSCOPY  [address] [place] [address]    CT BIOPSY RENAL  2022    CT BIOPSY RENAL 2022 [facility] CT SCAN    CYSTOSCOPY Left 2023    CYSTOSCOPY, LEFT URETERAL STENT PLACEMENT performed by Edilson Whalen MD at [facility] OR    ENDOSCOPY, COLON, DIAGNOSTIC  unsure    HEMORRHOID SURGERY  unsure    HIP SURGERY Left 2024    Left hemiarthroplasty performed by Virgilio Lezama DO at [facility] OR    HUMERUS FRACTURE SURGERY Right     ORIF    HYSTERECTOMY (CERVIX STATUS UNKNOWN)      age 38    LASIK      lasik    NECK SURGERY  18  years ago    cervical spine fusion ; in     OVARY REMOVAL Bilateral     age 38    SINUS SURGERY  10 years ago    SPINE SURGERY N/A 2023    KYPHOPLASTY L2 performed by Nimisha Garza MD at [facility] OR    TUBAL LIGATION      URETER SURGERY N/A 2023    CYSTOSCOPY, LEFT  URETEROSCOPY, LASER LITHOTRIPSIE OF STONES performed by Edilson Whalen MD at [facility] OR       Immunization History:   Immunization History   Administered Date(s) Administered    COVID-19, MODERNA    Isolation            No Isolation          Patient Infection Status       Infection Onset Added Last Indicated Last Indicated By Review Planned Expiration Resolved Resolved By    None active    Resolved    COVID-19 04/10/24 04/10/24 04/10/24 COVID-19 & Influenza Combo   24 Infection     COVID-19 22 POCT COVID-19 Rapid, NAAT   22 Infection                        Nurse Assessment:  Last Vital Signs: /67   Pulse 93   Temp 97.3 °F (36.3 °C)   Resp 12   Ht 1.524 m (5')   Wt 45.6 kg (100 lb 8.5 oz)   SpO2 99%   BMI 19.63 kg/m²     Last documented pain score (0-10 scale): Pain Level: 0  Last Weight:   Wt Readings from Last 1 Encounters:   06/10/24 45.6 kg (100 lb 8.5 oz)     Mental Status:  {IP PT MENTAL STATUS:}    IV Access:  { YAN IV ACCESS:596471728}    Nursing Mobility/ADLs:  Walking   {CHP DME ADLs:939455417}  Transfer  {CHP DME ADLs:875077561}  Bathing  {CHP DME ADLs:890501003}  Dressing  {CHP DME ADLs:734263864}  Toileting  {CHP DME ADLs:971167695}  Feeding  {CHP DME ADLs:494012209}  Med Admin  {CHP DME ADLs:104780912}  Med Delivery   { YAN MED Delivery:104582208}    Wound Care Documentation and Therapy:        Elimination:  Continence:   Bowel: {YES / NO:}  Bladder: {YES / NO:}  Urinary Catheter: {Urinary Catheter:427524145}   Colostomy/Ileostomy/Ileal Conduit: {YES / NO:}       Date of Last BM: ***    Intake/Output Summary (Last 24 hours) at 6/10/2024 1429  Last data filed at 6/10/2024 1045  Gross per 24 hour   Intake 240 ml   Output --   Net 240 ml     I/O last 3 completed shifts:  In: 300 [P.O.:300]  Out: -     Safety Concerns:     { YAN Safety Concerns:024702106}    Impairments/Disabilities:      { YAN Impairments/Disabilities:815068356}    Nutrition Therapy:  Current Nutrition Therapy:   { YAN Diet List:060008943}    Routes of Feeding: {[facility] DME Other Feedings:427134292}  Liquids: {Slp liquid thickness:35605}  Daily

## 2024-06-10 NOTE — PROGRESS NOTES
RECREATIONAL THERAPY  [facility]      Date:  6/10/2024            Patient Name: Deloris Watts           MRN: [id number]  Acct: [id number]          YOB: 1955 (68 y.o.)       Gender: female   Diagnosis: Subdural hematoma  Physician: Referring Practitioner: Dr. ZULEMA Whatley    REASON FOR MISSED TREATMENT:  Pt eating her lunch early to get ready to go to dialysis- present-affect edy Tan, CTRS    6/10/2024

## 2024-06-10 NOTE — PROGRESS NOTES
but Deloris can engage into simple, rote IADLs with CGA for balance and mod cues for initiation and attention, recall.  Deloris is limited by her strength, endurance, activity tolerance and cont to require skilled OT to improve safety at home.  Other: Pt has a shower chair in her tub/shower; grab bars, reacher and lift chair. Continue to assess further needs.    RECREATIONAL THERAPY  Patient has been offered participation in recreational therapy activities and participates as able. Pt enjoyed getting her hair washed and styled by our beautician-pleasant and affect bright but did not initiate conversation-pt fatigued and rested her eyes during activity-appreciative -pt has been sleeping a lot in between her therapy-Upon arrival pt sitting up in chair and bright affect-states she feels she is getting stronger and is supposed to go home next week-gave pt some word search puzzles to use in her free time-supportive contact given -    NUTRITION  Weight - Scale: 44.2 kg (97 lb 7.1 oz) / Body mass index is 19.03 kg/m².  Current diet: ADULT ORAL NUTRITION SUPPLEMENT; Breakfast, Lunch, Dinner; Frozen Oral Supplement  ADULT DIET; Regular  Please see nutrition note for details.    NURSING  Continent of Bowel: Yes, Dulcolax supp, Linzess, and Glycolax PRN. Colace 2 times per day, Senna nightly  Continent of Bladder: Yes.  Frequency: rarely d/t dialysis.  Management: bathroom/pads.  Pain is Managed:  Yes.  Management: Tylenol.  Frequency of Intervention: scheduled and PRN.  Adequately Controlled: Yes  Sleep: Adequate Trazadone  Signs and Symptoms of Infection:  No. Yes.  Site of Infection: n/a.  Antibiotic: n/a.  Signs and Symptoms of Skin Breakdown:  No.   Injury and/or Falls during Inpatient Rehabilitation Admission: No but has tele sitter d/t confusion.    Anticoagulants: Heparin sq  Diabetic: No  Consultations/Labs/X-rays: daily BMP  Oxygen while on IP Rehab:  No Currently using  0 liters per 0  .  Home oxygen: No  Patient/Family

## 2024-06-10 NOTE — PROGRESS NOTES
level  Home Access: Stairs to enter with rails  Entrance Stairs - Number of Steps: 1 MARIANA.  Pt has a ramp in the garage to enter the house.  pt's spouse uses a transport chair to assist patient in/out of house  Entrance Stairs - Rails: Left  Home Equipment: Lift chair, Walker - 4-Wheeled, Wheelchair - Manual, Walker - Rolling, Reacher, Grab bars (transport wheelchair)   Bathroom Shower/Tub: Tub/Shower unit, Shower chair with back, Curtain  Bathroom Toilet: Standard  Bathroom Equipment: Grab bars in shower, Shower chair  Bathroom Accessibility: Accessible    Receives Help From: Family  ADL Assistance: Needs assistance  Homemaking Assistance: Needs assistance  Ambulation Assistance: Needs assistance  Transfer Assistance: Needs assistance  Active : No  Additional Comments: Pt unable to recall home invormation relaiable, so her spouse provided PLOF. Pt's spouse reports patient was using a rollator at home and has someone walking with her at all times. He states she requires guidance and some light assist with ADLs at home. Over past few weeks, patient required increased physical assist with transfers and mobility. Pt and spouse report patient has someone with her at all times and if spouse leaves, he ensures someone is with her. Pt's spouse completes all IADLs at home.    Restrictions/Precautions:  Restrictions/Precautions: Fall Risk, General Precautions, Seizure, Bed Alarm  Position Activity Restriction  Other position/activity restrictions: History of L hemiarthroplasty 3/11/24.  Perfect serve to Dr. eLzama sent 5/30 to clarify if patient continues with hip precautions- Per Dr. Lezama, no YONG precautions.     SUBJECTIVE: Pt. Seated in her WC and pleasantly agrees to therapy session.      PAIN: No pain noted.     Vitals:   Patient Vitals for the past 8 hrs:   BP Patient Position Temp Temp src Pulse Resp SpO2 O2 Device   06/10/24 1045 (!) 149/68 Up in chair 98.1 °F (36.7 °C) Oral 89 18 100 % None (Room air)    06/10/24 0928 (!) 155/70 -- -- -- 88 -- -- --   06/10/24 0714 (!) 150/72 -- -- -- 95 -- -- --       OBJECTIVE:  Bed Mobility:  Rolling to Left: Stand By Assistance, with head of bed flat, without rail, with increased time for completion   Rolling to Right: Contact Guard Assistance, with head of bed flat, without rail, with verbal cues , with increased time for completion   Supine to Sit: Moderate Assistance, X 1, with head of bed flat, without rail, with verbal cues , with increased time for completion  Sit to Supine: Stand By Assistance, with head of bed flat, without rail, with verbal cues    Scooting: Minimal Assistance    Transfers:  Sit to Stand:Contact Guard Assistance, with increased time for completion, cues for hand placement  Stand to Sit:Contact Guard Assistance, with increased time for completion, cues for hand placement, with verbal cues  To/From Bed and Chair:Contact Guard Assistance, Minimal Assistance, with verbal cues, using RW  Car:Minimal Assistance, X 1, with increased time for completion, cues for hand placement, with verbal cues    Ambulation:  Minimal Assistance  Distance: 43' x1; 27' x1   Surface: Level Tile, Ramp  Device: Rolling Walker  Gait Deviations:  Forward Flexed Posture, Slow Anna, Decreased Step Length Bilaterally, Decreased Gait Speed, Decreased Heel Strike Bilaterally, Decreased Foot Clearance Right, Decreased Foot Clearance Left, Mild Path Deviations, Unsteady Gait, Decreased Terminal Knee Extension, Increased reliance on walker, and increased verbal cues for foot clearance, longer stride length bilaterally and staying in closer proximity to walker to improve safety and posture. Pt demonstrating increased tremors and poor follow through of maintaining in closer proximity to walker and required a chair to be brought up to sit down for safety.     Stairs:  None    Balance:  Static Standing Balance: Contact Guard Assistance  Dynamic Standing Balance: Contact Guard Assistance,

## 2024-06-11 PROCEDURE — 99232 SBSQ HOSP IP/OBS MODERATE 35: CPT | Performed by: PHYSICAL MEDICINE & REHABILITATION

## 2024-06-11 PROCEDURE — 97530 THERAPEUTIC ACTIVITIES: CPT

## 2024-06-11 PROCEDURE — 97116 GAIT TRAINING THERAPY: CPT

## 2024-06-11 PROCEDURE — 99232 SBSQ HOSP IP/OBS MODERATE 35: CPT | Performed by: INTERNAL MEDICINE

## 2024-06-11 PROCEDURE — 6370000000 HC RX 637 (ALT 250 FOR IP): Performed by: INTERNAL MEDICINE

## 2024-06-11 PROCEDURE — 97535 SELF CARE MNGMENT TRAINING: CPT

## 2024-06-11 PROCEDURE — 6360000002 HC RX W HCPCS: Performed by: PHYSICAL MEDICINE & REHABILITATION

## 2024-06-11 PROCEDURE — 1180000000 HC REHAB R&B

## 2024-06-11 PROCEDURE — 97110 THERAPEUTIC EXERCISES: CPT

## 2024-06-11 PROCEDURE — 6370000000 HC RX 637 (ALT 250 FOR IP): Performed by: PHYSICAL MEDICINE & REHABILITATION

## 2024-06-11 RX ADMIN — BUPROPION HYDROCHLORIDE 150 MG: 150 TABLET, EXTENDED RELEASE ORAL at 08:09

## 2024-06-11 RX ADMIN — DOCUSATE SODIUM 100 MG: 100 CAPSULE, LIQUID FILLED ORAL at 09:56

## 2024-06-11 RX ADMIN — BUPROPION HYDROCHLORIDE 300 MG: 300 TABLET, EXTENDED RELEASE ORAL at 21:23

## 2024-06-11 RX ADMIN — HEPARIN SODIUM 5000 UNITS: 5000 INJECTION INTRAVENOUS; SUBCUTANEOUS at 09:56

## 2024-06-11 RX ADMIN — SERTRALINE HYDROCHLORIDE 50 MG: 50 TABLET ORAL at 08:08

## 2024-06-11 RX ADMIN — SALINE NASAL SPRAY 1 SPRAY: 1.5 SOLUTION NASAL at 09:57

## 2024-06-11 RX ADMIN — SALINE NASAL SPRAY 1 SPRAY: 1.5 SOLUTION NASAL at 15:20

## 2024-06-11 RX ADMIN — ACETAMINOPHEN 650 MG: 325 TABLET ORAL at 11:56

## 2024-06-11 RX ADMIN — TRAZODONE HYDROCHLORIDE 100 MG: 100 TABLET ORAL at 21:23

## 2024-06-11 RX ADMIN — SEVELAMER CARBONATE 800 MG: 800 TABLET, FILM COATED ORAL at 17:43

## 2024-06-11 RX ADMIN — ACETAMINOPHEN 650 MG: 325 TABLET ORAL at 18:39

## 2024-06-11 RX ADMIN — SALINE NASAL SPRAY 1 SPRAY: 1.5 SOLUTION NASAL at 17:43

## 2024-06-11 RX ADMIN — SEVELAMER CARBONATE 800 MG: 800 TABLET, FILM COATED ORAL at 08:08

## 2024-06-11 RX ADMIN — LEVETIRACETAM 500 MG: 500 TABLET, FILM COATED ORAL at 08:08

## 2024-06-11 RX ADMIN — ONDANSETRON 4 MG: 4 TABLET, ORALLY DISINTEGRATING ORAL at 19:59

## 2024-06-11 RX ADMIN — MEGESTROL ACETATE 40 MG: 40 TABLET ORAL at 08:09

## 2024-06-11 RX ADMIN — AMLODIPINE BESYLATE 10 MG: 10 TABLET ORAL at 08:09

## 2024-06-11 RX ADMIN — ACETAMINOPHEN 650 MG: 325 TABLET ORAL at 05:31

## 2024-06-11 RX ADMIN — PRIMIDONE 50 MG: 50 TABLET ORAL at 09:56

## 2024-06-11 RX ADMIN — HEPARIN SODIUM 5000 UNITS: 5000 INJECTION INTRAVENOUS; SUBCUTANEOUS at 21:23

## 2024-06-11 RX ADMIN — CETIRIZINE HYDROCHLORIDE 5 MG: 5 TABLET ORAL at 08:09

## 2024-06-11 RX ADMIN — SEVELAMER CARBONATE 800 MG: 800 TABLET, FILM COATED ORAL at 11:57

## 2024-06-11 ASSESSMENT — ENCOUNTER SYMPTOMS
NAUSEA: 0
VOMITING: 0
WHEEZING: 0
DIARRHEA: 0
CONSTIPATION: 0
ABDOMINAL PAIN: 0
TROUBLE SWALLOWING: 0
SORE THROAT: 1
BACK PAIN: 0
COUGH: 1
SHORTNESS OF BREATH: 0

## 2024-06-11 ASSESSMENT — PAIN DESCRIPTION - DESCRIPTORS: DESCRIPTORS: DULL

## 2024-06-11 ASSESSMENT — PAIN SCALES - GENERAL
PAINLEVEL_OUTOF10: 0

## 2024-06-11 ASSESSMENT — PAIN DESCRIPTION - ORIENTATION: ORIENTATION: RIGHT;LEFT

## 2024-06-11 ASSESSMENT — PAIN - FUNCTIONAL ASSESSMENT: PAIN_FUNCTIONAL_ASSESSMENT: ACTIVITIES ARE NOT PREVENTED

## 2024-06-11 ASSESSMENT — PAIN DESCRIPTION - LOCATION: LOCATION: LEG

## 2024-06-11 NOTE — PLAN OF CARE
Problem: Discharge Planning  Goal: Discharge to home or other facility with appropriate resources  6/11/2024 0425 by Vaishnavi De La Fuente, RN  Outcome: Progressing  Flowsheets (Taken 6/11/2024 0425)  Discharge to home or other facility with appropriate resources:   Identify barriers to discharge with patient and caregiver   Identify discharge learning needs (meds, wound care, etc)     Problem: Safety - Adult  Goal: Free from fall injury  Outcome: Progressing  Flowsheets (Taken 6/11/2024 0425)  Free From Fall Injury: Instruct family/caregiver on patient safety     Problem: ABCDS Injury Assessment  Goal: Absence of physical injury  Outcome: Progressing  Flowsheets (Taken 6/11/2024 0425)  Absence of Physical Injury: Implement safety measures based on patient assessment     Problem: Neurosensory - Adult  Goal: Absence of seizures  Outcome: Progressing  Flowsheets (Taken 6/11/2024 0425)  Absence of seizures:   Monitor for seizure activity.  If seizure occurs, document type and location of movements and any associated apnea   Administer anticonvulsants as ordered     Problem: Skin/Tissue Integrity - Adult  Goal: Skin integrity remains intact  Outcome: Progressing  Flowsheets (Taken 6/11/2024 0425)  Skin Integrity Remains Intact: Monitor for areas of redness and/or skin breakdown     Problem: Musculoskeletal - Adult  Goal: Return mobility to safest level of function  Outcome: Progressing  Flowsheets (Taken 6/11/2024 0425)  Return Mobility to Safest Level of Function:   Assess patient stability and activity tolerance for standing, transferring and ambulating with or without assistive devices   Assist with transfers and ambulation using safe patient handling equipment as needed   Obtain physical therapy/occupational therapy consults as needed     Problem: Genitourinary - Adult  Goal: Absence of urinary retention  Outcome: Progressing  Flowsheets (Taken 6/11/2024 0425)  Absence of urinary retention:   Assess patient’s ability to

## 2024-06-11 NOTE — PROGRESS NOTES
[facility]  INPATIENT PHYSICAL THERAPY  [facility] - 8K-13/013-A  Daily Note    Time In: 1030  Time Out: 1130  Timed Code Treatment Minutes: 60 Minutes  Minutes: 60          Date: 2024  Patient Name: Deloris Watts,  Gender:  female        MRN: [id number]  : 1955  (68 y.o.)  Referral Date : 24  Referring Practitioner: Jax Whatley MD  Diagnosis: Subdural hematoma, acute (HCC)  Additional Pertinent Hx: Per H&P 24: Deloris Watts is a 68 y.o. right-handed  female with history of hypertension, osteoporosis, end-stage renal disease on hemodialysis (MWF) since 2023, stroke with left side weakness in , anemia, depression, anxiety, left hip hemiarthroplasty on 3/11/2024, and disability secondary to fall resulting acute left parafalcine subdural hematoma. The patient's  says he assisted the patient to sit on her rollator walker seat in living room and then pushing the rollator walker to bring the patient back to bedroom on 2024.  When they were near the bedroom door on the hallway, the patient suddenly slipped off the rollator walker seat and fell down to the floor with her head hitting the carpeted floor of the bedroom entrance.  She did not loss consciousness but felt some headache afterwards. CT of the head done on 2024 revealed small acute left parafalcine subdural hematoma.  Cervical spine CT on 2024 revealed no cervical spine injury.  Neurosurgery was consulted on 2024 for the acute subdural hematoma. A dose of tranexamic acid (TXA) was given.  No acute neurosurgical intervention was advised.  Neurosurgery note stated that Lovenox can be started on 2024 for DVT prophylaxis.  CT of head was repeated later on 2024 and showed stable CT scan of brain without interval change compared to previous study.     Prior Level of Function:  Lives With: Spouse  Type of Home: House  Home Layout: One  order to safely transfer from various surfaces  Short Term Goal 3: Pt will ambulate 50' with RW and CGA for improved functional mobility  Short Term Goal 4: Pt to perform ramp ambulation with RW and mod A  for progressing to home entry  Long Term Goals  Time Frame for Long Term Goals : 3 weeks from evaluation  Long Term Goal 1: Pt will perform sup<> sit in bed with HOB flat and no rail with min A for ease of getting out bed  Long Term Goal 2: Pt will sit<>stand without posterior trunk lean and safe hand placement with SBA and RW in order to safely transfer for safety in the home  Long Term Goal 3: Pt will ambulate 100' RW achieving heel strike more than 75% of the time with CGA for safety in community  Long Term Goal 4: Pt will ascend/descend ramp with RW and CGA for safe home entry  Long Term Goal 5: Pt to improve Tinetti score to 14 (MDC=4) in order to progress towards reducing fall risk  Additional Goals?: Yes  Long term goal 6: Pt to peform car transfer with RW and CGA in order to go out into her community    Following session, patient left in safe position with all fall risk precautions in place.

## 2024-06-11 NOTE — PLAN OF CARE
Problem: Discharge Planning  Goal: Discharge to home or other facility with appropriate resources  6/11/2024 1056 by Kusum Guajardo LISW  Note: Team conference held Tuesday, 6/11. Recommendations of the team were explained to the patient by Dr Whatley and SW. Team is recommending that patient continue on acute inpatient rehab for PT and OT, with expected discharge date of Saturday, 6/15. Following discharge, team is recommending home health services for RN, PT, OT and HHA. Care plan reviewed with patient. Patient verbalized understanding of the plan of care and contributed to goal setting. SW to follow and maintain involvement in discharge planning.

## 2024-06-11 NOTE — PROGRESS NOTES
[facility]   Nephrology progress note  6/11/2024, 11:17 AM      Pt Name:    Deloris Watts  MRN:     [id number]     YOB: 1955  Admit Date:    5/29/2024  4:40 PM    Chief Complaint: Nephrology following for ESRD on hemodialysis.    Subjective:  Patient seen and examined  Feels well rehab going well  No other complaints    Objective:  24HR INTAKE/OUTPUT:    Intake/Output Summary (Last 24 hours) at 6/11/2024 1117  Last data filed at 6/10/2024 2244  Gross per 24 hour   Intake 580 ml   Output 1000 ml   Net -420 ml        Admission weight: 43.5 kg (95 lb 14.4 oz)  Wt Readings from Last 3 Encounters:   06/11/24 44.2 kg (97 lb 7.1 oz)   05/29/24 43.5 kg (95 lb 14.4 oz)   05/13/24 39.9 kg (88 lb)        Vitals :   Vitals:    06/10/24 1523 06/10/24 1945 06/11/24 0515 06/11/24 0800   BP: 136/88 (!) 154/65  (!) 146/65   Pulse: 90 100  91   Resp: 14 14  16   Temp: 97.1 °F (36.2 °C) 98.4 °F (36.9 °C)  98.1 °F (36.7 °C)   TempSrc:  Oral  Oral   SpO2: 100% 100%  99%   Weight: 44.6 kg (98 lb 5.2 oz)  44.2 kg (97 lb 7.1 oz)    Height:   1.524 m (5')        Physical examination  General Appearance:  Well developed. No distress  Mouth/Throat:  Oral mucosa moist  Neck:  Supple, no JVD  Lungs:  Breath sounds: clear  Heart::  S1,S2 heard  Abdomen:  Soft, non - tender  Musculoskeletal:  Edema -no edema    Medications:  Infusion:    sodium chloride       Meds:    sodium chloride  1 spray Each Nostril Q4H While awake    cetirizine  5 mg Oral Daily    traZODone  100 mg Oral Nightly    docusate sodium  100 mg Oral BID    amLODIPine  10 mg Oral Daily    megestrol  40 mg Oral Daily    buPROPion  150 mg Oral QAM    buPROPion  300 mg Oral Nightly    primidone  50 mg Oral Daily    sertraline  50 mg Oral Daily    sevelamer  800 mg Oral TID WC    levETIRAcetam  500 mg Oral Daily    senna  1 tablet Oral Nightly    acetaminophen  650 mg Oral Q6H    heparin (porcine)  5,000 Units SubCUTAneous 2 times per day       Lab  Data :  CBC:   No results for input(s): \"WBC\", \"HGB\", \"HCT\", \"PLT\" in the last 72 hours.    CMP:  Recent Labs     06/09/24  0700 06/10/24  0557    137   K 4.7 5.3*    105   CO2 20* 17*   BUN 52* 62*   CREATININE 5.6* 6.1*   GLUCOSE 79 77   CALCIUM 9.2 8.8       Hepatic:   No results for input(s): \"LABALBU\", \"AST\", \"ALT\", \"BILITOT\", \"ALKPHOS\" in the last 72 hours.    Invalid input(s): \"ALB\"      Assessment and Plan:  ESRD on HD MWF  Volume status and electrolytes reasonable.   No acute need for dialysis today  Subdural hematoma 2/2 fall  Essential hypertension running better  Electrolytes mild hyperkalemia status post HD on a 2K bath  Recurrent falls  Secondary hyperparathyroidism doing well  Elevated trop  Meds reviewed discussed with the patient    To Cuadra MD  [facility]    This report has been created using voice recognition software. It may contain minor errors which are inherent in voice recognition technology

## 2024-06-11 NOTE — PROGRESS NOTES
[facility]  INPATIENT PHYSICAL THERAPY  [facility] - 8K-13/013-A  Daily Note    Time In: 08  Time Out: 0903  Timed Code Treatment Minutes: 30 Minutes  Minutes: 30          Date: 2024  Patient Name: Deloris Watts,  Gender:  female        MRN: [id number]  : 1955  (68 y.o.)  Referral Date : 24  Referring Practitioner: Jax Whatley MD  Diagnosis: Subdural hematoma, acute (HCC)  Additional Pertinent Hx: Per H&P 24: Deloris Watts is a 68 y.o. right-handed  female with history of hypertension, osteoporosis, end-stage renal disease on hemodialysis (MWF) since 2023, stroke with left side weakness in , anemia, depression, anxiety, left hip hemiarthroplasty on 3/11/2024, and disability secondary to fall resulting acute left parafalcine subdural hematoma. The patient's  says he assisted the patient to sit on her rollator walker seat in living room and then pushing the rollator walker to bring the patient back to bedroom on 2024.  When they were near the bedroom door on the hallway, the patient suddenly slipped off the rollator walker seat and fell down to the floor with her head hitting the carpeted floor of the bedroom entrance.  She did not loss consciousness but felt some headache afterwards. CT of the head done on 2024 revealed small acute left parafalcine subdural hematoma.  Cervical spine CT on 2024 revealed no cervical spine injury.  Neurosurgery was consulted on 2024 for the acute subdural hematoma. A dose of tranexamic acid (TXA) was given.  No acute neurosurgical intervention was advised.  Neurosurgery note stated that Lovenox can be started on 2024 for DVT prophylaxis.  CT of head was repeated later on 2024 and showed stable CT scan of brain without interval change compared to previous study.     Prior Level of Function:  Lives With: Spouse  Type of Home: House  Home Layout: One  Sitting 98.1 °F (36.7 °C) Oral 91 16 99 %     *Vitals may reflect data entered into the flowsheet prior to or after PT session was completed.      OBJECTIVE:  Bed Mobility:  Not Tested    Transfers:  Sit to Stand:Contact Guard Assistance, cues for hand placement, with verbal cues  Stand to Sit:Contact Guard Assistance, cues for hand placement, with verbal cues  To/From Bed and Chair:Contact Guard Assistance-Minimal Assistance to assist with turning walker, using RW     Ambulation:  Contact Guard Assistance-Minimal Assistance   Distance: 100' x1   Surface: Level Tile  Device: Rolling Walker  Gait Deviations:  Forward Flexed Posture, Slow Anna, Decreased Step Length Bilaterally, Decreased Gait Speed, Decreased Heel Strike Bilaterally, Decreased Foot Clearance Right, Decreased Foot Clearance Left, Mild Path Deviations, Decreased Terminal Knee Extension, and Increased reliance on walker    Stairs:  None    Balance:  Static Standing Balance: Contact Guard Assistance  Dynamic Standing Balance: Contact Guard Assistance, Minimal Assistance    Neuromuscular Re-education  -Completed stepping fwd/bkwd over fly-swatter using RW to work on lateral weight shifting, stability while in SLS and foot clearance bilaterally to improve gait technique.     Exercise:  None    Functional Outcome Measures:   Not completed  Modified [place] Scale:  Not Applicable     ASSESSMENT:  Assessment: Patient progressing toward established goals.  Activity Tolerance:  Patient tolerance of  treatment: good.     Equipment Recommendations:Equipment Needed: No  Other: pt has all equipment needed at home already  Discharge Recommendations: Continue to assess pending progress, 24 hour supervision or assist, Patient would benefit from continued therapy after discharge     PLAN: Current Treatment Recommendations: Strengthening, Balance training, Endurance training, Functional mobility training, Transfer training, Gait training, Home exercise program,

## 2024-06-11 NOTE — PROGRESS NOTES
Comprehensive Nutrition Assessment    Type and Reason for Visit: Reassess    Nutrition Recommendations/Plan:   Continue diet as ordered.   Continue Magic Cups TID and continue ONS at discharge.   Recommend renal MVI.   Continue Megace for appetite stimulation.     Malnutrition Assessment:  Malnutrition Status: Severe malnutrition  Context: Chronic Illness       Findings of the 6 clinical characteristics of malnutrition:  Energy Intake:  75% or less estimated energy requirements for 1 month or longer  Weight Loss:   (-10.6% in 1.5 months (no edema either weight))     Body Fat Loss:  Severe body fat loss Orbital, Triceps, Buccal region   Muscle Mass Loss:   (moderate) Temples (temporalis)  Fluid Accumulation:  Unable to assess (HD)     Strength:  Not Performed    Nutrition Assessment:    Pt. severely malnourished AEB criteria listed above.  At risk for further nutritional compromise r/t admit w/ subdural hematoma s/p fall, increased nutrient needs d/t known losses w/ dialysis and underlying medical condition (hx CKD, CHF, CVA, depression, HTN, HLD, ESRD-HD).        Nutrition Related Findings:    Pt. Report/Treatments/Miscellaneous:   6/11: Patient seen, lunch had just been delivered. Per patient and  her appetite has not bee great for over 1 week. She is eating well, but does not have much of an appetite.  reports patient is having diarrhea/ constipation. Patient is really enjoying the Magic Cups.   6/5: pt. Seen w/ ; states good appetite today; consuming less than 75% of meals; diarrhea reported 6/4 - medications adjusted - pt. Denies any diarrhea today; states acceptance of Magic Cups but is not drinking the Nepro - would like discontinued; spouse requesting pt. Receive the same breakfast daily as they seem to miss the nutrition ambassador taking the breakfast order - notified nutrition staff  5/30: pt. Seen - reports appetite is \"ok\"; denies any trouble tolerating diet; has only taken a few

## 2024-06-11 NOTE — PROGRESS NOTES
[facility]  Occupational Therapy  Daily Note  Time:    Time In: 1352  Time Out: 1522  Timed Code Treatment Minutes: 90 Minutes  Minutes: 90          Date: 2024  Patient Name: Deloris Watts,   Gender: female      Room: [facility]13/013  MRN: [id number]  : 1955  (68 y.o.)  Referring Practitioner: Dr. ZULEMA Whatley  Diagnosis: Subdural hematoma  Additional Pertinent Hx: Deloris Watts is a 68 y.o.  right-handed slim  female with history of hypertension, fibromyalgia, irritable bowel syndrome, essential tremor, end-stage renal disease on hemodialysis (MWF) since 2023, stroke with left side weakness in , depression, anxiety, right wrist and right ankle fracture treated conservatively, right proximal humeral fracture due to fall requiring ORIF, L2 compression fracture requiring kyphoplasty, LASEK surgery, left femur neck displaced fracture due to fall on 3/10/2024 requiring left hip hemiarthroplasty on 3/11/2024, was admitted on 2024 for intensive inpatient management of impairment & disability secondary to fall resulting acute left parafalcine subdural hematoma. Pt sustained a fall at home and brought to the hospital. CT of the head done on 2024 revealed small acute left parafalcine subdural hematoma.  Neurosurgery was consulted on 2024 for the acute subdural hematoma. A dose of tranexamic acid (TXA) was given.  No acute neurosurgical intervention was advised. Pt admitted to [facility] to regain strength, endurance and balance to improve indep with self care before returning home with spouse.    Restrictions/Precautions:  Restrictions/Precautions: Fall Risk, General Precautions, Seizure, Bed Alarm  Position Activity Restriction  Other position/activity restrictions: History of L hemiarthroplasty 3/11/24.  Perfect serve to Dr. Lezama sent  to clarify if patient continues with hip precautions- Per Dr. Lezama, no YONG  Patient/Caregiver education & training, Balance training, Functional mobility training, Self-Care / ADL, Endurance training, Equipment evaluation, education, & procurement, Safety education & training, Gait training, Neuromuscular re-education    Education:  Learners: Patient  ADL's, IADL's, Equipment Education, Reviewed Prior Education, Home Safety, and Assistive Device Safety    Goals  Short Term Goals  Time Frame for Short Term Goals: 1 week  Short Term Goal 1: Pt will demo improved standing tolerance up to 5 min SBA with 1-2 hand release to maximize performance with ADLs.  Short Term Goal 2: Pt will sequence and don UB clothing given SBA for task and sitting balance to maximize performace with ADLs.  Short Term Goal 3: Pt will complete LB dressing tasks with CGA using AE prn to improve indep with daily dressing tasks.  Short Term Goal 4: Pt will alighn self to surface (toilet, chair, wheelchair) with SBA and min vcs for positioning to decrease fall risk during transfers.  Short Term Goal 5: Pt will improve sitting balance as evidenced by engaging in seated tasks for 10 minutes with SBA and no cues to reposition to midline to enhance performance with seated ADLs.  Long Term Goals  Time Frame for Long Term Goals : 3 weeks from OT evaluation  Long Term Goal 1: Pt will complete tub/shower transfers with CGA using least restrictive AD to return to completing sponge baths in shower at home.  Long Term Goal 2: Pt will sequence/ problem solve bathing and dressing tasks with overall SBA to improve indep with daily dressing tasks.  Long Term Goal 3: Pt will navigate RW to/ from bathroom with SBA to enhance performace with bathroom related tasks.  Long Term Goal 4: Pt will improve UB strength to 4+/5 to improve global strength needed for ADL performance.    Following session, patient left in safe position with all fall risk precautions in place.

## 2024-06-11 NOTE — CARE COORDINATION
Participated in all therapies during shift. Patient's spouse present for most of shift. Telesitter in place. Rested in afternoon after therapy. Continues to take medications whole with applesauce. Patient tolerates well.

## 2024-06-12 LAB
ANION GAP SERPL CALC-SCNC: 15 MEQ/L (ref 8–16)
BASOPHILS ABSOLUTE: 0.1 THOU/MM3 (ref 0–0.1)
BASOPHILS NFR BLD AUTO: 1.1 %
BUN SERPL-MCNC: 60 MG/DL (ref 7–22)
CALCIUM SERPL-MCNC: 9.1 MG/DL (ref 8.5–10.5)
CHLORIDE SERPL-SCNC: 103 MEQ/L (ref 98–111)
CO2 SERPL-SCNC: 18 MEQ/L (ref 23–33)
CREAT SERPL-MCNC: 5.4 MG/DL (ref 0.4–1.2)
DEPRECATED RDW RBC AUTO: 58.8 FL (ref 35–45)
EOSINOPHIL NFR BLD AUTO: 3.1 %
EOSINOPHILS ABSOLUTE: 0.2 THOU/MM3 (ref 0–0.4)
ERYTHROCYTE [DISTWIDTH] IN BLOOD BY AUTOMATED COUNT: 15.1 % (ref 11.5–14.5)
GFR SERPL CREATININE-BSD FRML MDRD: 8 ML/MIN/1.73M2
GLUCOSE SERPL-MCNC: 79 MG/DL (ref 70–108)
HCT VFR BLD AUTO: 34.9 % (ref 37–47)
HGB BLD-MCNC: 10.3 GM/DL (ref 12–16)
IMM GRANULOCYTES # BLD AUTO: 0.04 THOU/MM3 (ref 0–0.07)
IMM GRANULOCYTES NFR BLD AUTO: 0.6 %
LYMPHOCYTES ABSOLUTE: 1.8 THOU/MM3 (ref 1–4.8)
LYMPHOCYTES NFR BLD AUTO: 27.9 %
MCH RBC QN AUTO: 31.3 PG (ref 26–33)
MCHC RBC AUTO-ENTMCNC: 29.5 GM/DL (ref 32.2–35.5)
MCV RBC AUTO: 106.1 FL (ref 81–99)
MONOCYTES ABSOLUTE: 0.5 THOU/MM3 (ref 0.4–1.3)
MONOCYTES NFR BLD AUTO: 8.3 %
NEUTROPHILS ABSOLUTE: 3.8 THOU/MM3 (ref 1.8–7.7)
NEUTROPHILS NFR BLD AUTO: 59 %
NRBC BLD AUTO-RTO: 0 /100 WBC
PLATELET # BLD AUTO: 138 THOU/MM3 (ref 130–400)
PMV BLD AUTO: 10.9 FL (ref 9.4–12.4)
POTASSIUM SERPL-SCNC: 5.9 MEQ/L (ref 3.5–5.2)
RBC # BLD AUTO: 3.29 MILL/MM3 (ref 4.2–5.4)
SODIUM SERPL-SCNC: 136 MEQ/L (ref 135–145)
WBC # BLD AUTO: 6.5 THOU/MM3 (ref 4.8–10.8)

## 2024-06-12 PROCEDURE — 99232 SBSQ HOSP IP/OBS MODERATE 35: CPT | Performed by: PHYSICAL MEDICINE & REHABILITATION

## 2024-06-12 PROCEDURE — 6360000002 HC RX W HCPCS: Performed by: PHYSICAL MEDICINE & REHABILITATION

## 2024-06-12 PROCEDURE — 97535 SELF CARE MNGMENT TRAINING: CPT

## 2024-06-12 PROCEDURE — 85025 COMPLETE CBC W/AUTO DIFF WBC: CPT

## 2024-06-12 PROCEDURE — 97110 THERAPEUTIC EXERCISES: CPT

## 2024-06-12 PROCEDURE — 92610 EVALUATE SWALLOWING FUNCTION: CPT | Performed by: SPEECH-LANGUAGE PATHOLOGIST

## 2024-06-12 PROCEDURE — 92526 ORAL FUNCTION THERAPY: CPT | Performed by: SPEECH-LANGUAGE PATHOLOGIST

## 2024-06-12 PROCEDURE — 99232 SBSQ HOSP IP/OBS MODERATE 35: CPT | Performed by: INTERNAL MEDICINE

## 2024-06-12 PROCEDURE — 36415 COLL VENOUS BLD VENIPUNCTURE: CPT

## 2024-06-12 PROCEDURE — 90935 HEMODIALYSIS ONE EVALUATION: CPT

## 2024-06-12 PROCEDURE — 97116 GAIT TRAINING THERAPY: CPT

## 2024-06-12 PROCEDURE — 97530 THERAPEUTIC ACTIVITIES: CPT

## 2024-06-12 PROCEDURE — 80048 BASIC METABOLIC PNL TOTAL CA: CPT

## 2024-06-12 PROCEDURE — 1180000000 HC REHAB R&B

## 2024-06-12 PROCEDURE — 97112 NEUROMUSCULAR REEDUCATION: CPT

## 2024-06-12 PROCEDURE — 6370000000 HC RX 637 (ALT 250 FOR IP): Performed by: PHYSICAL MEDICINE & REHABILITATION

## 2024-06-12 RX ADMIN — DOCUSATE SODIUM 100 MG: 100 CAPSULE, LIQUID FILLED ORAL at 11:00

## 2024-06-12 RX ADMIN — CETIRIZINE HYDROCHLORIDE 5 MG: 5 TABLET ORAL at 09:02

## 2024-06-12 RX ADMIN — ACETAMINOPHEN 650 MG: 325 TABLET ORAL at 23:33

## 2024-06-12 RX ADMIN — SENNOSIDES 8.6 MG: 8.6 TABLET ORAL at 23:31

## 2024-06-12 RX ADMIN — ACETAMINOPHEN 650 MG: 325 TABLET ORAL at 06:32

## 2024-06-12 RX ADMIN — BUPROPION HYDROCHLORIDE 150 MG: 150 TABLET, EXTENDED RELEASE ORAL at 09:02

## 2024-06-12 RX ADMIN — SEVELAMER CARBONATE 800 MG: 800 TABLET, FILM COATED ORAL at 09:01

## 2024-06-12 RX ADMIN — SERTRALINE HYDROCHLORIDE 50 MG: 50 TABLET ORAL at 09:02

## 2024-06-12 RX ADMIN — SALINE NASAL SPRAY 1 SPRAY: 1.5 SOLUTION NASAL at 23:31

## 2024-06-12 RX ADMIN — SALINE NASAL SPRAY 1 SPRAY: 1.5 SOLUTION NASAL at 06:32

## 2024-06-12 RX ADMIN — SEVELAMER CARBONATE 800 MG: 800 TABLET, FILM COATED ORAL at 11:35

## 2024-06-12 RX ADMIN — SALINE NASAL SPRAY 1 SPRAY: 1.5 SOLUTION NASAL at 17:31

## 2024-06-12 RX ADMIN — ACETAMINOPHEN 650 MG: 325 TABLET ORAL at 17:31

## 2024-06-12 RX ADMIN — DOCUSATE SODIUM 100 MG: 100 CAPSULE, LIQUID FILLED ORAL at 23:31

## 2024-06-12 RX ADMIN — HEPARIN SODIUM 5000 UNITS: 5000 INJECTION INTRAVENOUS; SUBCUTANEOUS at 23:32

## 2024-06-12 RX ADMIN — ACETAMINOPHEN 650 MG: 325 TABLET ORAL at 11:35

## 2024-06-12 RX ADMIN — SALINE NASAL SPRAY 1 SPRAY: 1.5 SOLUTION NASAL at 09:00

## 2024-06-12 RX ADMIN — MEGESTROL ACETATE 40 MG: 40 TABLET ORAL at 09:01

## 2024-06-12 RX ADMIN — HEPARIN SODIUM 5000 UNITS: 5000 INJECTION INTRAVENOUS; SUBCUTANEOUS at 09:07

## 2024-06-12 RX ADMIN — SEVELAMER CARBONATE 800 MG: 800 TABLET, FILM COATED ORAL at 17:31

## 2024-06-12 RX ADMIN — TRAZODONE HYDROCHLORIDE 100 MG: 100 TABLET ORAL at 23:31

## 2024-06-12 RX ADMIN — PRIMIDONE 50 MG: 50 TABLET ORAL at 09:02

## 2024-06-12 RX ADMIN — BUPROPION HYDROCHLORIDE 300 MG: 300 TABLET, EXTENDED RELEASE ORAL at 23:32

## 2024-06-12 ASSESSMENT — ENCOUNTER SYMPTOMS
COUGH: 0
DIARRHEA: 0
SORE THROAT: 0
RHINORRHEA: 0
WHEEZING: 0
SHORTNESS OF BREATH: 0
NAUSEA: 0
BACK PAIN: 0
ABDOMINAL PAIN: 0
CONSTIPATION: 0
TROUBLE SWALLOWING: 0
VOMITING: 0

## 2024-06-12 ASSESSMENT — PAIN SCALES - GENERAL: PAINLEVEL_OUTOF10: 0

## 2024-06-12 NOTE — PROGRESS NOTES
[facility]  Occupational Therapy  Daily Note  Time:   Time In: 0700  Time Out: 0830  Timed Code Treatment Minutes: 90 Minutes  Minutes: 90          Date: 2024  Patient Name: Deloris Watts,   Gender: female      Room: [facility]13/013  MRN: [id number]  : 1955  (68 y.o.)  Referring Practitioner: Dr. ZULEMA Whatley  Diagnosis: Subdural hematoma  Additional Pertinent Hx: Deloris Watts is a 68 y.o.  right-handed slim  female with history of hypertension, fibromyalgia, irritable bowel syndrome, essential tremor, end-stage renal disease on hemodialysis (MWF) since 2023, stroke with left side weakness in , depression, anxiety, right wrist and right ankle fracture treated conservatively, right proximal humeral fracture due to fall requiring ORIF, L2 compression fracture requiring kyphoplasty, LASEK surgery, left femur neck displaced fracture due to fall on 3/10/2024 requiring left hip hemiarthroplasty on 3/11/2024, was admitted on 2024 for intensive inpatient management of impairment & disability secondary to fall resulting acute left parafalcine subdural hematoma. Pt sustained a fall at home and brought to the hospital. CT of the head done on 2024 revealed small acute left parafalcine subdural hematoma.  Neurosurgery was consulted on 2024 for the acute subdural hematoma. A dose of tranexamic acid (TXA) was given.  No acute neurosurgical intervention was advised. Pt admitted to [facility] to regain strength, endurance and balance to improve indep with self care before returning home with spouse.    Restrictions/Precautions:  Restrictions/Precautions: Fall Risk, General Precautions, Seizure, Bed Alarm  Position Activity Restriction  Other position/activity restrictions: History of L hemiarthroplasty 3/11/24.  Perfect serve to Dr. Lezama sent  to clarify if patient continues with hip precautions- Per Dr. Lezama, no YONG precautions.

## 2024-06-12 NOTE — PROGRESS NOTES
Patient: Deloris Watts  Unit/Bed: 8K-13/013-A  YOB: 1955  MRN: [id number] Acct: [id number]   Admitting Diagnosis: Subdural hematoma (HCC) [S06.5XAA]  Admit Date:  5/29/2024  Hospital Day: 14    Assessment:     Principal Problem:    Subdural hematoma, acute (HCC)  Active Problems:    Anemia due to chronic kidney disease, on chronic dialysis (HCC)    Depression    Allergic rhinitis    Essential hypertension    Chronic kidney disease with in-center hemodialysis preferred by patient    Essential tremor    Cerebral concussion    Impaired cognition    Severe malnutrition (HCC)  Resolved Problems:    * No resolved hospital problems. *      Plan:     Continue to follow        Subjective:     Patient has no complaint of CP or SOB..   Medication side effects: none    Scheduled Meds:   sodium chloride  1 spray Each Nostril Q4H While awake    cetirizine  5 mg Oral Daily    traZODone  100 mg Oral Nightly    docusate sodium  100 mg Oral BID    amLODIPine  10 mg Oral Daily    megestrol  40 mg Oral Daily    buPROPion  150 mg Oral QAM    buPROPion  300 mg Oral Nightly    primidone  50 mg Oral Daily    sertraline  50 mg Oral Daily    sevelamer  800 mg Oral TID WC    levETIRAcetam  500 mg Oral Daily    senna  1 tablet Oral Nightly    acetaminophen  650 mg Oral Q6H    heparin (porcine)  5,000 Units SubCUTAneous 2 times per day     Continuous Infusions:   sodium chloride       PRN Meds:Benzocaine-Menthol, loperamide, linaclotide, bisacodyl, ondansetron, sodium chloride flush, sodium chloride, acetaminophen, hydrALAZINE, magnesium hydroxide, polyethylene glycol    Review of Systems  Pertinent items are noted in HPI.    Objective:     Patient Vitals for the past 8 hrs:   BP Temp Temp src Pulse Resp SpO2 Weight   06/12/24 0857 (!) 135/59 97.7 °F (36.5 °C) Oral 87 18 100 % --   06/12/24 0425 -- -- -- -- -- -- 45.5 kg (100 lb 5 oz)     I/O last 3 completed shifts:  In: 1180 [P.O.:1180]  Out: -   I/O this shift:  In: 220

## 2024-06-12 NOTE — PROGRESS NOTES
[facility]  Recreational Therapy  Discharge Note  Inpatient Rehabilitation Unit         Date:  6/12/2024       Patient Name: Deloris Watts      MRN: [id number]       YOB: 1955 (68 y.o.)       Gender: female  Diagnosis: Subdural hematoma  Referring Practitioner: Dr. ZULEMA Whatley    Patient discharged from Recreational Therapy at this time.  See recreational therapy notes for details.    Electronically signed by: Elena Tan CTRS  Date: 6/12/2024

## 2024-06-12 NOTE — FLOWSHEET NOTE
06/12/24 1500   Vital Signs   BP (!) 147/80  (Simultaneous filing. User may not have seen previous data.)   Temp 98.7 °F (37.1 °C)   Pulse 93  (Simultaneous filing. User may not have seen previous data.)   Respirations 14  (Simultaneous filing. User may not have seen previous data.)   SpO2 98 %   Weight - Scale 42.4 kg (93 lb 7.6 oz)   Weight Method Bed scale   Percent Weight Change -6.81   Post-Hemodialysis Assessment   Post-Treatment Procedures Blood returned;Catheter Capped, clamped with Saline x2 ports   Machine Disinfection Process Acid/Vinegar Clean;Heat Disinfect;Exterior Machine Disinfection   Blood Volume Processed (Liters) 57.6 L   Dialyzer Clearance Lightly streaked   Duration of Treatment (minutes) 150 minutes   Heparin Amount Administered During Treatment (mL) 0 mL   Hemodialysis Intake (ml) 400 ml   Hemodialysis Output (ml) 400 ml   NET Removed (ml) 0     2.5 hour treatment completed. No fluid removed. Cath lines flushed with 0.9 NS, clamped and tego secured. Report given to primary RN. Charting printed and placed in bin to be scanned into EMR.

## 2024-06-12 NOTE — PROGRESS NOTES
[facility]  SPEECH THERAPY  [facility]  Clinical Swallow Evaluation+ Dysphagia tx      SLP Individual Minutes  Time In: 1050  Time Out: 1128  Minutes: 38  Timed Code Treatment Minutes: 0 Minutes     CSE-26 minutes  Dysphagia tx- 12 minutes     DIET ORDER RECOMMENDATIONS AFTER EVALUATION: Soft and bite size with Thin liquids     Date: 2024  Patient Name: Deloris Watts      CSN: [id number]   : 1955  (68 y.o.)  Gender: female   Referring Physician:  Dr. Jax Whatley MD    Diagnosis: Subdural hematoma, acute ([facility])    History of Present Illness/Injury: Patient admitted with subdural hematoma s/p fall. Please refer to physician notes for further details regarding medical course.     Past Medical History:   Diagnosis Date    Allergic rhinitis     Anemia in CKD (chronic kidney disease)     Anxiety     CHF (congestive heart failure) ([facility])     Closed fracture of right proximal humerus 2021    ORIF    Closed right ankle fracture     In ; treated with casting    CVA (cerebral infarction)     in  ; with left-sided weakness    Depression     Fibromyalgia     in     Headache(784.0)     Hemiplegia and hemiparesis following cerebral infarction affecting left non-dominant side ([facility])     in     Hydronephrosis 2023    Urogenital Implants, calculus of ureter, UTI    Hyperlipidemia     Hypertension     in     Irritable bowel syndrome     in     Kidney failure     Chronic Kidney Disease, Renal Dialysis started in 2023    Osteoporosis 2019    Unspecified cerebral artery occlusion with cerebral infarction     Unspecified sleep apnea     Wrist fracture, right 2018    Treated with casting       SUBJECTIVE:  Patient seen sitting upright in wheelchair for completion of clinical swallow evaluation. No family present.     OBJECTIVE:    Pain:  No pain reported.    Current Diet: Regular with Thin liquids     Respiratory Status:  Room  Air    Behavioral Observation:  Alert and Oriented    CRANIAL NERVE ASSESSMENT   CN V (Trigeminal) Closes and Opens Mandible WFL    Rotary Jaw Movement WFL      CN VII (Facial) Cheeks Hold Food out of Sulci Impaired: Unilateral - Left    Opens, Closes/Seals, Protrudes, Retracts Lips WFL    General Appearance WFL    Sensation Impaired      CN X (Vagus - Pharyngeal) Raises Back of Tongue Not Tested      CN XI (Accessory) Lifts Soft Palate WFL      CN XII (Hypoglossal) Elevates Tongue Up and Back Not Tested    Protrusion   Fasciculations noted with all motoric movement attempts    Lateralizes Tongue WFL and See above    Sensation Not Tested      Other Observations Dentition Intact     Vocal Quality Clear, low intensity     Cough DNT     Patient Evaluated Using: Thin Liquids, Puree, Soft Solids, and Coarse Solids    Oral Phase:  Impaired:  Pocketing Left    Pharyngeal Phase: WFL:  Pharyngeal phase appears WFL but cannot rule out pharyngeal phase deficits from a bedside swallowing evaluation alone.    Signs and Symptoms of Laryngeal Penetration/Aspiration:  Effortful swallow at times    Aspiration Pneumonia Predictors:  Decreased Cognition, Limited Mobility, and Chronic Medical Issues/Pertinent Co-Morbidities    Functional Oral Intake Scale: Total Oral Intake: 5.  Total oral intake of multiple consistencies requiring special preparation    Impressions: Patient presents with mild oral dysphagia with inability to fully discern potential presence of pharyngeal phase deficits without formal instrumentation. Oral phase characterized by appropriate textural breakdown and intact bolus formation. However, marked loss of material to the left lateral sulci evident with loss of sensation of material as patient placing additional bites into oral cavity prior to clearance. Required liquid wash and lingual sweep to clear material. Increased time and multiple attempts needed to clear denser solids (pork cutlet), with patient having to

## 2024-06-12 NOTE — PLAN OF CARE
and family to use good hand hygiene technique     Problem: Metabolic/Fluid and Electrolytes - Adult  Goal: Electrolytes maintained within normal limits  6/12/2024 1128 by Lorna Hester RN  Outcome: Progressing  Flowsheets (Taken 6/11/2024 1950 by Beverley Hendrickson RN)  Electrolytes maintained within normal limits:   Monitor labs and assess patient for signs and symptoms of electrolyte imbalances   Administer electrolyte replacement as ordered  6/12/2024 0020 by Beverley Hendrickson RN  Outcome: Progressing  Flowsheets (Taken 6/11/2024 1950)  Electrolytes maintained within normal limits:   Monitor labs and assess patient for signs and symptoms of electrolyte imbalances   Administer electrolyte replacement as ordered     Problem: Hematologic - Adult  Goal: Maintains hematologic stability  6/12/2024 1128 by Lorna Hester RN  Outcome: Progressing  Flowsheets (Taken 6/11/2024 1950 by Beverley Hendrickson RN)  Maintains hematologic stability: Assess for signs and symptoms of bleeding or hemorrhage  6/12/2024 0020 by Beverley Hendrickson RN  Outcome: Progressing  Flowsheets (Taken 6/11/2024 1950)  Maintains hematologic stability: Assess for signs and symptoms of bleeding or hemorrhage     Problem: Chronic Conditions and Co-morbidities  Goal: Patient's chronic conditions and co-morbidity symptoms are monitored and maintained or improved  6/12/2024 1128 by Lorna eHster RN  Outcome: Progressing  Flowsheets (Taken 6/11/2024 1950 by Beverley Hendrickson RN)  Care Plan - Patient's Chronic Conditions and Co-Morbidity Symptoms are Monitored and Maintained or Improved: Monitor and assess patient's chronic conditions and comorbid symptoms for stability, deterioration, or improvement  6/12/2024 0020 by Beverley Hendrickson RN  Outcome: Progressing  Flowsheets (Taken 6/11/2024 1950)  Care Plan - Patient's Chronic Conditions and Co-Morbidity Symptoms are Monitored and Maintained or Improved: Monitor and assess patient's chronic conditions and  comorbid symptoms for stability, deterioration, or improvement     Problem: Nutrition Deficit:  Goal: Optimize nutritional status  Outcome: Progressing  Flowsheets (Taken 6/11/2024 1413 by Kaylyn Watson, RD)  Nutrient intake appropriate for improving, restoring, or maintaining nutritional needs:   Assess nutritional status and recommend course of action   Monitor oral intake, labs, and treatment plans   Recommend appropriate diets, oral nutritional supplements, and vitamin/mineral supplements     Problem: Pain  Goal: Verbalizes/displays adequate comfort level or baseline comfort level  6/12/2024 1128 by Lorna Hester, RN  Outcome: Progressing  Flowsheets (Taken 6/11/2024 0425 by Vaishnavi De La Fuente, RN)  Verbalizes/displays adequate comfort level or baseline comfort level:   Assess pain using appropriate pain scale   Encourage patient to monitor pain and request assistance   Administer analgesics based on type and severity of pain and evaluate response   Implement non-pharmacological measures as appropriate and evaluate response  6/12/2024 0020 by Beverley Hendrickson, RN  Outcome: Progressing

## 2024-06-12 NOTE — PROGRESS NOTES
Patient resting most of shift. Patient did not verbalized understanding or demonstrated proper use of incentive spirometer

## 2024-06-12 NOTE — PROGRESS NOTES
[facility]  Recreational Therapy  Daily Note  [facility]    Time Spent with Patient: 23 minutes    Date:  6/12/2024       Patient Name: Deloris Watts      MRN: [id number]      YOB: 1955 (68 y.o.)       Gender: female  Diagnosis: Subdural hematoma  Referring Practitioner: Dr. UZLEMA Whatley    RESTRICTIONS/PRECAUTIONS:  Restrictions/Precautions: Fall Risk, General Precautions, Seizure, Bed Alarm     Hearing: Within functional limits    PAIN: 0    SUBJECTIVE:  I am doing ok     OBJECTIVE:  Pt enjoyed getting her hair washed and styled by our beautician this am-affect brightened-pleasant -        Patient Education  New Education Provided: Importance of Leisure,     Electronically signed by: Elena Tan, CTRS  Date: 6/12/2024

## 2024-06-12 NOTE — PROGRESS NOTES
[facility]  INPATIENT PHYSICAL THERAPY  [facility] - 8K-13/013-A  Daily Note    Time In: 0900  Time Out: 1030  Timed Code Treatment Minutes: 90 Minutes  Minutes: 90          Date: 2024  Patient Name: Deloris Watts,  Gender:  female        MRN: [id number]  : 1955  (68 y.o.)  Referral Date : 24  Referring Practitioner: Jax Whatley MD  Diagnosis: Subdural hematoma, acute (HCC)  Additional Pertinent Hx: Per H&P 24: Deloris Watts is a 68 y.o. right-handed  female with history of hypertension, osteoporosis, end-stage renal disease on hemodialysis (MWF) since 2023, stroke with left side weakness in , anemia, depression, anxiety, left hip hemiarthroplasty on 3/11/2024, and disability secondary to fall resulting acute left parafalcine subdural hematoma. The patient's  says he assisted the patient to sit on her rollator walker seat in living room and then pushing the rollator walker to bring the patient back to bedroom on 2024.  When they were near the bedroom door on the hallway, the patient suddenly slipped off the rollator walker seat and fell down to the floor with her head hitting the carpeted floor of the bedroom entrance.  She did not loss consciousness but felt some headache afterwards. CT of the head done on 2024 revealed small acute left parafalcine subdural hematoma.  Cervical spine CT on 2024 revealed no cervical spine injury.  Neurosurgery was consulted on 2024 for the acute subdural hematoma. A dose of tranexamic acid (TXA) was given.  No acute neurosurgical intervention was advised.  Neurosurgery note stated that Lovenox can be started on 2024 for DVT prophylaxis.  CT of head was repeated later on 2024 and showed stable CT scan of brain without interval change compared to previous study.     Prior Level of Function:  Lives With: Spouse  Type of Home: House  Home Layout: One  with chair alarm on and Tele-sitter present for ST and lunch.     Pain: No pain noted.     Vitals:   Patient Vitals for the past 8 hrs:   BP Patient Position Temp Temp src Pulse Resp SpO2   06/12/24 0857 (!) 135/59 Sitting 97.7 °F (36.5 °C) Oral 87 18 100 %     *Vitals may reflect data entered into the flowsheet prior to or after PT session was completed.      OBJECTIVE:  Bed Mobility:  Not Tested    Transfers:  Sit to Stand:Contact Guard Assistance, Minimal Assistance, cues for hand placement  Stand to Sit:Contact Guard Assistance, cues for hand placement, with verbal cues  To/From Bed and Chair:Contact Guard Assistance, using RW; multiple trials from WC<-> to chair to practice technique and safety of staying inside walker when turning.   Car:Contact Guard Assistance, Minimal Assistance, X 1, with increased time for completion, cues for hand placement, with verbal cues    Ambulation:  Contact Guard Assistance, Minimal Assistance, with cues for safety, with increased time for completion  Distance: 68' x1; 10'x2; 20' x1   Surface: Level Tile and Ramp  Device: Rolling Walker  Gait Deviations:  Forward Flexed Posture, Slow Anna, Decreased Step Length Bilaterally, Decreased Gait Speed, Decreased Heel Strike Bilaterally, Decreased Foot Clearance Right, Decreased Foot Clearance Left, Narrow Base of Support, Mild Path Deviations, Unsteady Gait, Decreased Terminal Knee Extension, and Increased reliance on assistive device, festinating gait/ \"freezing\" episodes when turning and approaching chairs, increased verbal cues for staying inside walker with forward ambulation and turning and also for improved stride length and foot clearance bilaterally, poor follow through.     Stairs:  Platform: 6\" platform X 1 using Rolling Walker and Minimal Assistance, X 1, with cues for safety, with verbal cues , with increased time for completion.    Balance:  Static Standing Balance: Contact Guard Assistance  Dynamic Standing Balance:

## 2024-06-12 NOTE — PROGRESS NOTES
[facility]   Nephrology progress note  6/12/2024, 11:04 AM      Pt Name:    Deloris Watts  MRN:     [id number]     YOB: 1955  Admit Date:    5/29/2024  4:40 PM    Chief Complaint: Nephrology following for ESRD on hemodialysis.    Subjective:  Patient seen and examined  Feels well. No other complaints    Objective:  24HR INTAKE/OUTPUT:    Intake/Output Summary (Last 24 hours) at 6/12/2024 1104  Last data filed at 6/12/2024 0940  Gross per 24 hour   Intake 1060 ml   Output --   Net 1060 ml        Admission weight: 43.5 kg (95 lb 14.4 oz)  Wt Readings from Last 3 Encounters:   06/12/24 45.5 kg (100 lb 5 oz)   05/29/24 43.5 kg (95 lb 14.4 oz)   05/13/24 39.9 kg (88 lb)        Vitals :   Vitals:    06/11/24 1945 06/1955 06/12/24 0425 06/12/24 0857   BP: (!) 143/63 (!) 146/73  (!) 135/59   Pulse: (!) 101 (!) 102  87   Resp: 16 18  18   Temp: 98.1 °F (36.7 °C) 98.4 °F (36.9 °C)  97.7 °F (36.5 °C)   TempSrc: Oral Oral  Oral   SpO2:  98%  100%   Weight:   45.5 kg (100 lb 5 oz)    Height:           Physical examination  General Appearance:  Well developed. No distress  Mouth/Throat:  Oral mucosa moist  Neck:  Supple, no JVD  Lungs:  Breath sounds: clear  Heart::  S1,S2 heard  Abdomen:  Soft, non - tender  Musculoskeletal:  Edema -no edema    Medications:  Infusion:    sodium chloride       Meds:    sodium chloride  1 spray Each Nostril Q4H While awake    cetirizine  5 mg Oral Daily    traZODone  100 mg Oral Nightly    docusate sodium  100 mg Oral BID    amLODIPine  10 mg Oral Daily    megestrol  40 mg Oral Daily    buPROPion  150 mg Oral QAM    buPROPion  300 mg Oral Nightly    primidone  50 mg Oral Daily    sertraline  50 mg Oral Daily    sevelamer  800 mg Oral TID WC    levETIRAcetam  500 mg Oral Daily    senna  1 tablet Oral Nightly    acetaminophen  650 mg Oral Q6H    heparin (porcine)  5,000 Units SubCUTAneous 2 times per day       Lab Data :  CBC:   Recent Labs      06/12/24  0417   WBC 6.5   HGB 10.3*   HCT 34.9*          CMP:  Recent Labs     06/10/24  0557 06/12/24 0417    136   K 5.3* 5.9*    103   CO2 17* 18*   BUN 62* 60*   CREATININE 6.1* 5.4*   GLUCOSE 77 79   CALCIUM 8.8 9.1       Hepatic:   No results for input(s): \"LABALBU\", \"AST\", \"ALT\", \"BILITOT\", \"ALKPHOS\" in the last 72 hours.    Invalid input(s): \"ALB\"      Assessment and Plan:  ESRD on HD MWF  Volume status and electrolytes reasonable.   Will get hemodialysis today  Subdural hematoma 2/2 fall  Essential hypertension running better  Electrolytes mild hyperkalemia status post HD on a 2K bath we will also place her on a low potassium diet  Recurrent falls  Secondary hyperparathyroidism doing well  Elevated trop  Meds reviewed discussed with the patient    To Cuadra MD  [facility]    This report has been created using voice recognition software. It may contain minor errors which are inherent in voice recognition technology

## 2024-06-12 NOTE — PROGRESS NOTES
Physical Medicine & Rehabilitation Progress Note    Chief Complaint:  Fall resulting subdural hematoma     Subjective:    Deloris Watts is a 68 y.o.  right-handed slim  female with history of hypertension, hyperlipidemia, hydronephrosis, fibromyalgia, irritable bowel syndrome, essential tremor, osteoporosis, end-stage renal disease on hemodialysis (MWF) since January 2023, stroke with left side weakness in 1990s, anemia, depression, anxiety, right wrist and right ankle fracture treated conservatively, right proximal humeral fracture due to fall requiring ORIF, status post right carpal tunnel release surgery, status post cervical spine surgery, status post hysterectomy and bilateral oophorectomy, hemorrhoidectomy, sinus surgery, tubal ligation, L2 compression fracture requiring kyphoplasty, LASEK surgery, left femur neck displaced fracture due to fall on 3/10/2024 requiring left hip hemiarthroplasty on 3/11/2024, was admitted on 5/29/2024 for intensive inpatient management of impairment & disability secondary to fall resulting acute left parafalcine subdural hematoma.     The patient is known to inpatient rehab service.  She was previously in our inpatient rehab service from 12/21/2023 to 1/5/2024 after a fall accident on 12/14/2023 resulting L2 compression fracture requiring kyphoplasty, and liver laceration.  She was discharged to home on 1/5/2024 with referral for outpatient PT, OT and speech therapy.  She again sustained a fall accident at home bathroom on 3/10/2024 resulting left femur neck displaced fracture requiring left hip hemiarthroplasty done on 3/11/2024.  She then was admitted to inpatient rehab service again on 3/14/2024.  She was discharged home on 3/30/2024 with referral for outpatient PT and OT treatment after discharge.     The patient's  says he assisted the patient to sit on her rollator walker seat in living room and then pushing the rollator walker to bring the patient back  swelling ; oral mucosa pink   Neck :  supple ; no tenderness; mild muscle spasm at the bilateral upper trapezius muscles  Cardiovascular : regular rate & rhythm ; normal S1 & S2 heart sound ; no murmur ; presence of hemodialysis catheter at right anterior upper chest wall; normal peripheral pulse at the bilateral upper extremities; slightly reduced pulse strength at the bilateral lower extremities  Pulmonary : Breath sound present at bilateral lung field; no wheezing ; no rale; no rhonchi; no crackle  Gastrointestinal : soft, protruded abdomen; no tenderness; normal bowel sound present   Back : No tenderness with palpation; no muscle spasm  Skin: no skin lesion or rash ; no pitting edema at all 4 extremities; presence of surgical scar at right anterior shoulder and upper arm; presence of healed surgical scar at left lateral hip and lateral upper thigh  Musculoskeletal : no limb asymmetry; no obvious limb deformity; no tenderness at bilateral upper & lower extremities; no palpable mass at limbs ; no joints laxity or crepitation at bilateral upper and lower extremities; right shoulder flexion and abduction passive ROM reaching 160 degrees; left shoulder flexion and abduction passive ROM reaching 170 degree; right wrist extension passive ROM reaching 30 degrees; right hip flexion passive ROM reaching 110 degree; left hip flexion passive ROM reaching 110 degrees ; ankle dorsiflexion passive ROM reaching 5 degrees on right side and 5 degree on left side; normal functional joints ROM at the rest of bilateral upper & lower extremities  Cerebral :  alert ; awake ; oriented to place, person ; not oriented to month or year; follow 1 step verbal command  Cerebellum : no dysmetria with bilateral finger-to-nose test but with tremor; has some difficulty performing rapid finger pinching movement bilaterally  Cranial Nerves :  grossly intact CN II to XII function  Sensory : intact and symmetrical light touch and pin prick sensation

## 2024-06-12 NOTE — CARE COORDINATION
Resting most of this shift.  Did wake up when  was here and got a bath.  Easily wakes up for meds, assessment.  Denies pain,

## 2024-06-12 NOTE — PLAN OF CARE
standing, transferring and ambulating with or without assistive devices     Problem: Gastrointestinal - Adult  Goal: Maintains or returns to baseline bowel function  Outcome: Progressing  Flowsheets (Taken 6/11/2024 1950)  Maintains or returns to baseline bowel function: Assess bowel function     Problem: Genitourinary - Adult  Goal: Absence of urinary retention  Outcome: Progressing     Problem: Infection - Adult  Goal: Absence of infection at discharge  Recent Flowsheet Documentation  Taken 6/11/2024 1950 by Beverley Hendrickson RN  Absence of infection at discharge:   Assess and monitor for signs and symptoms of infection   Monitor lab/diagnostic results   [place] appropriate cooling/warming therapies per order   Administer medications as ordered     Problem: Infection - Adult  Goal: Absence of infection during hospitalization  Outcome: Progressing  Flowsheets (Taken 6/11/2024 1950)  Absence of infection during hospitalization:   Assess and monitor for signs and symptoms of infection   Monitor lab/diagnostic results   Administer medications as ordered   Instruct and encourage patient and family to use good hand hygiene technique     Problem: Metabolic/Fluid and Electrolytes - Adult  Goal: Electrolytes maintained within normal limits  Outcome: Progressing  Flowsheets (Taken 6/11/2024 1950)  Electrolytes maintained within normal limits:   Monitor labs and assess patient for signs and symptoms of electrolyte imbalances   Administer electrolyte replacement as ordered     Problem: Hematologic - Adult  Goal: Maintains hematologic stability  Outcome: Progressing  Flowsheets (Taken 6/11/2024 1950)  Maintains hematologic stability: Assess for signs and symptoms of bleeding or hemorrhage     Problem: Chronic Conditions and Co-morbidities  Goal: Patient's chronic conditions and co-morbidity symptoms are monitored and maintained or improved  Outcome: Progressing  Flowsheets (Taken 6/11/2024 1950)  Care Plan - Patient's Chronic  Conditions and Co-Morbidity Symptoms are Monitored and Maintained or Improved: Monitor and assess patient's chronic conditions and comorbid symptoms for stability, deterioration, or improvement     Problem: Pain  Goal: Verbalizes/displays adequate comfort level or baseline comfort level  Outcome: Progressing

## 2024-06-12 NOTE — PROGRESS NOTES
Patient: Deloris Watts  Unit/Bed: 8K-13/013-A  YOB: 1955  MRN: [id number] Acct: [id number]   Admitting Diagnosis: Subdural hematoma (HCC) [S06.5XAA]  Admit Date:  5/29/2024  Hospital Day: 13    This patient was seen earlier in the day.    Assessment:     Principal Problem:    Subdural hematoma, acute (HCC)  Active Problems:    Anemia due to chronic kidney disease, on chronic dialysis (HCC)    Depression    Allergic rhinitis    Essential hypertension    Chronic kidney disease with in-center hemodialysis preferred by patient    Essential tremor    Cerebral concussion    Impaired cognition    Severe malnutrition (HCC)  Resolved Problems:    * No resolved hospital problems. *      Plan:     Continue to follow        Subjective:     Patient has no complaint of CP or SOB..   Medication side effects: none    Scheduled Meds:   sodium chloride  1 spray Each Nostril Q4H While awake    cetirizine  5 mg Oral Daily    traZODone  100 mg Oral Nightly    docusate sodium  100 mg Oral BID    amLODIPine  10 mg Oral Daily    megestrol  40 mg Oral Daily    buPROPion  150 mg Oral QAM    buPROPion  300 mg Oral Nightly    primidone  50 mg Oral Daily    sertraline  50 mg Oral Daily    sevelamer  800 mg Oral TID WC    levETIRAcetam  500 mg Oral Daily    senna  1 tablet Oral Nightly    acetaminophen  650 mg Oral Q6H    heparin (porcine)  5,000 Units SubCUTAneous 2 times per day     Continuous Infusions:   sodium chloride       PRN Meds:Benzocaine-Menthol, loperamide, linaclotide, bisacodyl, ondansetron, sodium chloride flush, sodium chloride, acetaminophen, hydrALAZINE, magnesium hydroxide, polyethylene glycol    Review of Systems  Pertinent items are noted in HPI.    Objective:     Patient Vitals for the past 8 hrs:   BP Temp Temp src Pulse Resp SpO2   06/1955 (!) 146/73 98.4 °F (36.9 °C) Oral (!) 102 18 98 %   06/11/24 1945 (!) 143/63 98.1 °F (36.7 °C) Oral (!) 101 16 --     I/O last 3 completed shifts:  In: 1060  [P.O.:1060]  Out: 1000   No intake/output data recorded.    BP (!) 146/73   Pulse (!) 102   Temp 98.4 °F (36.9 °C) (Oral)   Resp 18   Ht 1.524 m (5')   Wt 44.2 kg (97 lb 7.1 oz)   SpO2 98%   BMI 19.03 kg/m²     General appearance: alert, appears stated age, and cooperative  Head: Normocephalic, without obvious abnormality, atraumatic  Lungs: clear to auscultation bilaterally  Heart: regular rate and rhythm, S1, S2 normal, no murmur, click, rub or gallop  Abdomen: soft, non-tender; bowel sounds normal; no masses,  no organomegaly  Extremities: extremities normal, atraumatic, no cyanosis or edema  Skin: Skin color, texture, turgor normal. No rashes or lesions  Neurologic:  weak    Electronically signed by Gareth Mosquera MD on 6/11/2024 at 9:39 PM

## 2024-06-12 NOTE — PROGRESS NOTES
Patient: Deloris Watts  Unit/Bed: 8K-13/013-A  YOB: 1955  MRN: [id number] Acct: [id number]   Admitting Diagnosis: Subdural hematoma (HCC) [S06.5XAA]  Admit Date:  5/29/2024  Hospital Day: 13    This patient was seen earlier in the day.    Assessment:     Principal Problem:    Subdural hematoma, acute (HCC)  Active Problems:    Anemia due to chronic kidney disease, on chronic dialysis (HCC)    Depression    Allergic rhinitis    Essential hypertension    Chronic kidney disease with in-center hemodialysis preferred by patient    Essential tremor    Cerebral concussion    Impaired cognition    Severe malnutrition (HCC)  Resolved Problems:    * No resolved hospital problems. *      Plan:     Continue to  follow        Subjective:     Patient has no complaint of CP or SOB..   Medication side effects: none    Scheduled Meds:   sodium chloride  1 spray Each Nostril Q4H While awake    cetirizine  5 mg Oral Daily    traZODone  100 mg Oral Nightly    docusate sodium  100 mg Oral BID    amLODIPine  10 mg Oral Daily    megestrol  40 mg Oral Daily    buPROPion  150 mg Oral QAM    buPROPion  300 mg Oral Nightly    primidone  50 mg Oral Daily    sertraline  50 mg Oral Daily    sevelamer  800 mg Oral TID WC    levETIRAcetam  500 mg Oral Daily    senna  1 tablet Oral Nightly    acetaminophen  650 mg Oral Q6H    heparin (porcine)  5,000 Units SubCUTAneous 2 times per day     Continuous Infusions:   sodium chloride       PRN Meds:Benzocaine-Menthol, loperamide, linaclotide, bisacodyl, ondansetron, sodium chloride flush, sodium chloride, acetaminophen, hydrALAZINE, magnesium hydroxide, polyethylene glycol    Review of Systems  Pertinent items are noted in HPI.    Objective:     Patient Vitals for the past 8 hrs:   BP Temp Temp src Pulse Resp SpO2   06/1955 (!) 146/73 98.4 °F (36.9 °C) Oral (!) 102 18 98 %   06/11/24 1945 (!) 143/63 98.1 °F (36.7 °C) Oral (!) 101 16 --     I/O last 3 completed shifts:  In: 1060  [P.O.:1060]  Out: 1000   No intake/output data recorded.    BP (!) 146/73   Pulse (!) 102   Temp 98.4 °F (36.9 °C) (Oral)   Resp 18   Ht 1.524 m (5')   Wt 44.2 kg (97 lb 7.1 oz)   SpO2 98%   BMI 19.03 kg/m²     General appearance: alert, appears stated age, and cooperative  Head: Normocephalic, without obvious abnormality, atraumatic  Lungs: clear to auscultation bilaterally  Heart: regular rate and rhythm, S1, S2 normal, no murmur, click, rub or gallop  Abdomen: soft, non-tender; bowel sounds normal; no masses,  no organomegaly  Extremities: extremities normal, atraumatic, no cyanosis or edema  Skin: Skin color, texture, turgor normal. No rashes or lesions  Neurologic:  weak    Electronically signed by Gareth Mosquera MD on 6/11/2024 at 9:37 PM

## 2024-06-12 NOTE — CARE COORDINATION
Patient at dialysis most of afternoon. Per dialysis, no fluid removed and patient tolerated well. Taking medications with only water and tolerating well. Telesitter present. Spouse present after dialysis.

## 2024-06-13 PROBLEM — G20.C PARKINSONISM (HCC): Status: ACTIVE | Noted: 2024-06-13

## 2024-06-13 PROCEDURE — 6370000000 HC RX 637 (ALT 250 FOR IP): Performed by: INTERNAL MEDICINE

## 2024-06-13 PROCEDURE — 99232 SBSQ HOSP IP/OBS MODERATE 35: CPT | Performed by: PHYSICAL MEDICINE & REHABILITATION

## 2024-06-13 PROCEDURE — 97110 THERAPEUTIC EXERCISES: CPT

## 2024-06-13 PROCEDURE — 1180000000 HC REHAB R&B

## 2024-06-13 PROCEDURE — 99232 SBSQ HOSP IP/OBS MODERATE 35: CPT | Performed by: INTERNAL MEDICINE

## 2024-06-13 PROCEDURE — 6360000002 HC RX W HCPCS: Performed by: PHYSICAL MEDICINE & REHABILITATION

## 2024-06-13 PROCEDURE — 97530 THERAPEUTIC ACTIVITIES: CPT

## 2024-06-13 PROCEDURE — 6370000000 HC RX 637 (ALT 250 FOR IP): Performed by: PHYSICAL MEDICINE & REHABILITATION

## 2024-06-13 PROCEDURE — 97116 GAIT TRAINING THERAPY: CPT

## 2024-06-13 PROCEDURE — 92526 ORAL FUNCTION THERAPY: CPT | Performed by: SPEECH-LANGUAGE PATHOLOGIST

## 2024-06-13 RX ADMIN — HEPARIN SODIUM 5000 UNITS: 5000 INJECTION INTRAVENOUS; SUBCUTANEOUS at 09:08

## 2024-06-13 RX ADMIN — AMLODIPINE BESYLATE 10 MG: 10 TABLET ORAL at 09:08

## 2024-06-13 RX ADMIN — SEVELAMER CARBONATE 800 MG: 800 TABLET, FILM COATED ORAL at 09:07

## 2024-06-13 RX ADMIN — SALINE NASAL SPRAY 1 SPRAY: 1.5 SOLUTION NASAL at 12:37

## 2024-06-13 RX ADMIN — LEVETIRACETAM 500 MG: 500 TABLET, FILM COATED ORAL at 09:08

## 2024-06-13 RX ADMIN — CARBIDOPA AND LEVODOPA 1 TABLET: 10; 100 TABLET ORAL at 12:39

## 2024-06-13 RX ADMIN — MEGESTROL ACETATE 40 MG: 40 TABLET ORAL at 09:07

## 2024-06-13 RX ADMIN — SERTRALINE HYDROCHLORIDE 50 MG: 50 TABLET ORAL at 09:07

## 2024-06-13 RX ADMIN — SEVELAMER CARBONATE 800 MG: 800 TABLET, FILM COATED ORAL at 12:37

## 2024-06-13 RX ADMIN — ACETAMINOPHEN 650 MG: 325 TABLET ORAL at 06:02

## 2024-06-13 RX ADMIN — DOCUSATE SODIUM 100 MG: 100 CAPSULE, LIQUID FILLED ORAL at 09:08

## 2024-06-13 RX ADMIN — HEPARIN SODIUM 5000 UNITS: 5000 INJECTION INTRAVENOUS; SUBCUTANEOUS at 20:12

## 2024-06-13 RX ADMIN — CARBIDOPA AND LEVODOPA 1 TABLET: 10; 100 TABLET ORAL at 16:26

## 2024-06-13 RX ADMIN — DOCUSATE SODIUM 100 MG: 100 CAPSULE, LIQUID FILLED ORAL at 20:12

## 2024-06-13 RX ADMIN — SALINE NASAL SPRAY 1 SPRAY: 1.5 SOLUTION NASAL at 20:13

## 2024-06-13 RX ADMIN — CETIRIZINE HYDROCHLORIDE 5 MG: 5 TABLET ORAL at 09:08

## 2024-06-13 RX ADMIN — SALINE NASAL SPRAY 1 SPRAY: 1.5 SOLUTION NASAL at 16:26

## 2024-06-13 RX ADMIN — BUPROPION HYDROCHLORIDE 300 MG: 300 TABLET, EXTENDED RELEASE ORAL at 20:11

## 2024-06-13 RX ADMIN — BUPROPION HYDROCHLORIDE 150 MG: 150 TABLET, EXTENDED RELEASE ORAL at 09:08

## 2024-06-13 RX ADMIN — SENNOSIDES 8.6 MG: 8.6 TABLET ORAL at 20:12

## 2024-06-13 RX ADMIN — CARBIDOPA AND LEVODOPA 1 TABLET: 10; 100 TABLET ORAL at 06:03

## 2024-06-13 RX ADMIN — TRAZODONE HYDROCHLORIDE 100 MG: 100 TABLET ORAL at 20:12

## 2024-06-13 RX ADMIN — SEVELAMER CARBONATE 800 MG: 800 TABLET, FILM COATED ORAL at 16:26

## 2024-06-13 RX ADMIN — ACETAMINOPHEN 650 MG: 325 TABLET ORAL at 12:37

## 2024-06-13 RX ADMIN — PRIMIDONE 50 MG: 50 TABLET ORAL at 09:07

## 2024-06-13 ASSESSMENT — PAIN SCALES - GENERAL: PAINLEVEL_OUTOF10: 0

## 2024-06-13 ASSESSMENT — ENCOUNTER SYMPTOMS
WHEEZING: 0
DIARRHEA: 0
VOMITING: 0
RHINORRHEA: 0
ABDOMINAL PAIN: 0
SHORTNESS OF BREATH: 0
NAUSEA: 0
BACK PAIN: 0
SORE THROAT: 0
TROUBLE SWALLOWING: 0
CONSTIPATION: 0
COUGH: 0

## 2024-06-13 NOTE — PROGRESS NOTES
Patient: Deloris Watts  Unit/Bed: 8K-13/013-A  YOB: 1955  MRN: [id number] Acct: [id number]   Admitting Diagnosis: Subdural hematoma (HCC) [S06.5XAA]  Admit Date:  5/29/2024  Hospital Day: 15    Assessment:     Principal Problem:    Subdural hematoma, acute (HCC)  Active Problems:    Anemia due to chronic kidney disease, on chronic dialysis (HCC)    Depression    Allergic rhinitis    Essential hypertension    Chronic kidney disease with in-center hemodialysis preferred by patient    Essential tremor    Cerebral concussion    Impaired cognition    Severe malnutrition (HCC)    Parkinsonism (HCC)  Resolved Problems:    * No resolved hospital problems. *      Plan:     Continue to follow        Subjective:     Patient has no complaint of CP or SOB..   Medication side effects: none    Scheduled Meds:   carbidopa-levodopa  1 tablet Oral TID    sodium chloride  1 spray Each Nostril Q4H While awake    cetirizine  5 mg Oral Daily    traZODone  100 mg Oral Nightly    docusate sodium  100 mg Oral BID    amLODIPine  10 mg Oral Daily    megestrol  40 mg Oral Daily    buPROPion  150 mg Oral QAM    buPROPion  300 mg Oral Nightly    primidone  50 mg Oral Daily    sertraline  50 mg Oral Daily    sevelamer  800 mg Oral TID WC    levETIRAcetam  500 mg Oral Daily    senna  1 tablet Oral Nightly    acetaminophen  650 mg Oral Q6H    heparin (porcine)  5,000 Units SubCUTAneous 2 times per day     Continuous Infusions:   sodium chloride       PRN Meds:Benzocaine-Menthol, loperamide, linaclotide, bisacodyl, ondansetron, sodium chloride flush, sodium chloride, acetaminophen, hydrALAZINE, magnesium hydroxide, polyethylene glycol    Review of Systems  Pertinent items are noted in HPI.    Objective:     No data found.  I/O last 3 completed shifts:  In: 1780 [P.O.:1380]  Out: 500 [Urine:100]  I/O this shift:  In: 240 [P.O.:240]  Out: -     BP (!) 142/66   Pulse 82   Temp 97.8 °F (36.6 °C) (Oral)   Resp 18   Ht 1.524 m (5')

## 2024-06-13 NOTE — PROGRESS NOTES
[facility]  Occupational Therapy  Daily Note  Time:    Time In: 1030  Time Out: 1200  Timed Code Treatment Minutes: 90 Minutes  Minutes: 90          Date: 2024  Patient Name: Deloris Watts,   Gender: female      Room: [facility]13/013  MRN: [id number]  : 1955  (68 y.o.)  Referring Practitioner: Dr. ZULEMA Whatley  Diagnosis: Subdural hematoma  Additional Pertinent Hx: Deloris Watts is a 68 y.o.  right-handed slim  female with history of hypertension, fibromyalgia, irritable bowel syndrome, essential tremor, end-stage renal disease on hemodialysis (MWF) since 2023, stroke with left side weakness in , depression, anxiety, right wrist and right ankle fracture treated conservatively, right proximal humeral fracture due to fall requiring ORIF, L2 compression fracture requiring kyphoplasty, LASEK surgery, left femur neck displaced fracture due to fall on 3/10/2024 requiring left hip hemiarthroplasty on 3/11/2024, was admitted on 2024 for intensive inpatient management of impairment & disability secondary to fall resulting acute left parafalcine subdural hematoma. Pt sustained a fall at home and brought to the hospital. CT of the head done on 2024 revealed small acute left parafalcine subdural hematoma.  Neurosurgery was consulted on 2024 for the acute subdural hematoma. A dose of tranexamic acid (TXA) was given.  No acute neurosurgical intervention was advised. Pt admitted to [facility] to regain strength, endurance and balance to improve indep with self care before returning home with spouse.    Restrictions/Precautions:  Restrictions/Precautions: Fall Risk, General Precautions, Seizure, Bed Alarm  Position Activity Restriction  Other position/activity restrictions: History of L hemiarthroplasty 3/11/24.  Perfect serve to Dr. Lezama sent  to clarify if patient continues with hip precautions- Per Dr. Lezama, no YONG

## 2024-06-13 NOTE — PROGRESS NOTES
Physical Medicine & Rehabilitation Progress Note    Chief Complaint:  Fall resulting subdural hematoma     Subjective:    Deloris Watts is a 68 y.o.  right-handed slim  female with history of hypertension, hyperlipidemia, hydronephrosis, fibromyalgia, irritable bowel syndrome, essential tremor, osteoporosis, end-stage renal disease on hemodialysis (MWF) since January 2023, stroke with left side weakness in 1990s, anemia, depression, anxiety, right wrist and right ankle fracture treated conservatively, right proximal humeral fracture due to fall requiring ORIF, status post right carpal tunnel release surgery, status post cervical spine surgery, status post hysterectomy and bilateral oophorectomy, hemorrhoidectomy, sinus surgery, tubal ligation, L2 compression fracture requiring kyphoplasty, LASEK surgery, left femur neck displaced fracture due to fall on 3/10/2024 requiring left hip hemiarthroplasty on 3/11/2024, was admitted on 5/29/2024 for intensive inpatient management of impairment & disability secondary to fall resulting acute left parafalcine subdural hematoma.     The patient is known to inpatient rehab service.  She was previously in our inpatient rehab service from 12/21/2023 to 1/5/2024 after a fall accident on 12/14/2023 resulting L2 compression fracture requiring kyphoplasty, and liver laceration.  She was discharged to home on 1/5/2024 with referral for outpatient PT, OT and speech therapy.  She again sustained a fall accident at home bathroom on 3/10/2024 resulting left femur neck displaced fracture requiring left hip hemiarthroplasty done on 3/11/2024.  She then was admitted to inpatient rehab service again on 3/14/2024.  She was discharged home on 3/30/2024 with referral for outpatient PT and OT treatment after discharge.     The patient's  says he assisted the patient to sit on her rollator walker seat in living room and then pushing the rollator walker to bring the patient back  Plan is for pt to have HH services to promote safe transitions to home.       ST: Reviewed  Completed skilled dietary analysis with noon meal consisting of soft/bite size chicken, carrots, mashed potatoes and thin liquids. Patient reporting increased success with textural breakdown and management of softer solid consistencies since diet modification yesterday. Reduced oral residuals evident, however patient continuing to require occasional cues for liquid was and lingual sweep to ensure complete clearance. No cough, throat clear or change in vocal quality evident to indicate airway invasion episodes.      Provided education to family on rationale for recent diet change, s/s of aspiration/pharyngeal swallow impairment to monitor for and recommended positional/compensatory strategies. Referenced swallow guide in the room reviewing each strategy at length.  reporting that at home, patient had been consuming 70% of meals in her recliner in reclined position. Discussed concerns related to this positioning and safer options related to fully upright with feet flat on the floor. Also reviewed need for focus on ensuring oral cavity is cleared between bites and that patient is consuming small bites/sips. Family expressing understanding.     Planning to bring written education materials on soft and bite size diet to next session in preparation for discharge from facility on 6/15/24.      Pulmonary Status  WBC (date 6/12/24): 6.5  Lung Sounds (date 6/13/24)              Right Upper Lobe: Clear                       Right Middle Lobe: Diminished              Right Lower Lobe: Diminished              Left Upper Lobe: Clear              Left Lower Lobe: Clear     CXR (date 5/26/24)  IMPRESSION:  No acute cardiopulmonary abnormality.        Review of Systems:  Review of Systems   Constitutional:  Negative for chills, diaphoresis, fatigue and fever.   HENT:  Negative for congestion, hearing loss, rhinorrhea, sneezing, sore

## 2024-06-13 NOTE — PLAN OF CARE
Problem: Discharge Planning  Goal: Discharge to home or other facility with appropriate resources  Outcome: Progressing     Problem: Safety - Adult  Goal: Free from fall injury  Outcome: Progressing     Problem: ABCDS Injury Assessment  Goal: Absence of physical injury  Outcome: Progressing     Problem: Skin/Tissue Integrity  Goal: Absence of new skin breakdown  Description: 1.  Monitor for areas of redness and/or skin breakdown  2.  Assess vascular access sites hourly  3.  Every 4-6 hours minimum:  Change oxygen saturation probe site  4.  Every 4-6 hours:  If on nasal continuous positive airway pressure, respiratory therapy assess nares and determine need for appliance change or resting period.  Outcome: Progressing     Problem: Skin/Tissue Integrity - Adult  Goal: Skin integrity remains intact  Outcome: Progressing  Flowsheets (Taken 6/12/2024 4109)  Skin Integrity Remains Intact: Monitor for areas of redness and/or skin breakdown     Problem: Pain  Goal: Verbalizes/displays adequate comfort level or baseline comfort level  Outcome: Progressing

## 2024-06-13 NOTE — PROGRESS NOTES
[facility]  INPATIENT PHYSICAL THERAPY  [facility] - 8K-13/013-A  Daily Note    Time In: 0730  Time Out: 0900  Timed Code Treatment Minutes: 90 Minutes  Minutes: 90          Date: 2024  Patient Name: Deloris Watts,  Gender:  female        MRN: [id number]  : 1955  (68 y.o.)  Referral Date : 24  Referring Practitioner: Jax Whatley MD  Diagnosis: Subdural hematoma, acute (HCC)  Additional Pertinent Hx: Per H&P 24: Deloris Watts is a 68 y.o. right-handed  female with history of hypertension, osteoporosis, end-stage renal disease on hemodialysis (MWF) since 2023, stroke with left side weakness in , anemia, depression, anxiety, left hip hemiarthroplasty on 3/11/2024, and disability secondary to fall resulting acute left parafalcine subdural hematoma. The patient's  says he assisted the patient to sit on her rollator walker seat in living room and then pushing the rollator walker to bring the patient back to bedroom on 2024.  When they were near the bedroom door on the hallway, the patient suddenly slipped off the rollator walker seat and fell down to the floor with her head hitting the carpeted floor of the bedroom entrance.  She did not loss consciousness but felt some headache afterwards. CT of the head done on 2024 revealed small acute left parafalcine subdural hematoma.  Cervical spine CT on 2024 revealed no cervical spine injury.  Neurosurgery was consulted on 2024 for the acute subdural hematoma. A dose of tranexamic acid (TXA) was given.  No acute neurosurgical intervention was advised.  Neurosurgery note stated that Lovenox can be started on 2024 for DVT prophylaxis.  CT of head was repeated later on 2024 and showed stable CT scan of brain without interval change compared to previous study.     Prior Level of Function:  Lives With: Spouse  Type of Home: House  Home Layout: One

## 2024-06-13 NOTE — PLAN OF CARE
Problem: Discharge Planning  Goal: Discharge to home or other facility with appropriate resources  6/13/2024 1351 by Lo Machado, RN  Outcome: Progressing  Flowsheets (Taken 6/13/2024 1351)  Discharge to home or other facility with appropriate resources:   Identify barriers to discharge with patient and caregiver   Identify discharge learning needs (meds, wound care, etc)     Problem: Safety - Adult  Goal: Free from fall injury  6/13/2024 1351 by Lo Machado, RN  Outcome: Progressing  Flowsheets (Taken 6/13/2024 1351)  Free From Fall Injury: Instruct family/caregiver on patient safety     Problem: ABCDS Injury Assessment  Goal: Absence of physical injury  6/13/2024 1351 by Lo Machado RN  Outcome: Progressing  Flowsheets (Taken 6/13/2024 1351)  Absence of Physical Injury: Implement safety measures based on patient assessment      Patient's daughter called, stated that her mom should be getting out of the hospital this weekend.  And would like to know if and when to reschedule her next lanreotide.  Informed that Paulette may want to see her as well, so she asked if we could find out before scheduling.    Cira can be reached at [phone]

## 2024-06-13 NOTE — PROGRESS NOTES
[facility]   Nephrology progress note  6/13/2024, 10:46 AM      Pt Name:    Deloris Watts  MRN:     [id number]     YOB: 1955  Admit Date:    5/29/2024  4:40 PM    Chief Complaint: Nephrology following for ESRD on hemodialysis.    Subjective:  Patient seen and examined  Feels well. No other complaints  Rehab going on well    Objective:  24HR INTAKE/OUTPUT:    Intake/Output Summary (Last 24 hours) at 6/13/2024 1046  Last data filed at 6/13/2024 0925  Gross per 24 hour   Intake 1080 ml   Output 500 ml   Net 580 ml        Admission weight: 43.5 kg (95 lb 14.4 oz)  Wt Readings from Last 3 Encounters:   06/13/24 43.3 kg (95 lb 7.4 oz)   05/29/24 43.5 kg (95 lb 14.4 oz)   05/13/24 39.9 kg (88 lb)        Vitals :   Vitals:    06/12/24 2315 06/13/24 0600 06/13/24 0900 06/13/24 1023   BP: (!) 142/82  (!) 163/69 (!) 142/66   Pulse: 93  85 82   Resp: 16  18    Temp: 98.1 °F (36.7 °C)      TempSrc: Oral  Oral    SpO2: 98%      Weight:  43.3 kg (95 lb 7.4 oz)     Height:           Physical examination  General Appearance: Cachectic no distress  Mouth/Throat:  Oral mucosa moist  Neck:  Supple, no JVD  Lungs:  Breath sounds: clear  Heart::  S1,S2 heard  Abdomen:  Soft, non - tender  Musculoskeletal:  Edema -no edema    Medications:  Infusion:    sodium chloride       Meds:    carbidopa-levodopa  1 tablet Oral TID    sodium chloride  1 spray Each Nostril Q4H While awake    cetirizine  5 mg Oral Daily    traZODone  100 mg Oral Nightly    docusate sodium  100 mg Oral BID    amLODIPine  10 mg Oral Daily    megestrol  40 mg Oral Daily    buPROPion  150 mg Oral QAM    buPROPion  300 mg Oral Nightly    primidone  50 mg Oral Daily    sertraline  50 mg Oral Daily    sevelamer  800 mg Oral TID WC    levETIRAcetam  500 mg Oral Daily    senna  1 tablet Oral Nightly    acetaminophen  650 mg Oral Q6H    heparin (porcine)  5,000 Units SubCUTAneous 2 times per day       Lab Data :  CBC:   Recent Labs

## 2024-06-13 NOTE — PROGRESS NOTES
[facility]  INPATIENT SPEECH THERAPY  [facility]  DAILY NOTE    TIME   SLP Individual Minutes  Time In: 1237  Time Out: 1300  Minutes: 23  Timed Code Treatment Minutes: 0 Minutes       Date: 2024  Patient Name: Deloris Watts      CSN: [id number]   : 1955  (68 y.o.)  Gender: female   Referring Physician:  Dr. Jax Whatley MD  Diagnosis: Subdural Hematoma, acute  Precautions: Fall risk, Seizure precautions  Current Diet: Soft and bite size with Thin liquids  Respiratory Status: Room Air  Swallowing Strategies: Full Upright Position, Small Bite/Sip, Oral Care after all Meals, and Intermittent Supervision Check oral cavity for residuals.       Date of Last MBS/FEES: Not Applicable    Pain:  No pain reported.    Subjective:  Patient positioned upright in recliner for noon meal.  and additional family members present throughout duration of session.     Short-Term Goals:  SHORT TERM GOAL #1: Patient will safely consume a soft and bite size diet and thin liquids with use of positional/compensatory strategies to ensure adequate nutrition and hydration measures.   INTERVENTIONS: Completed skilled dietary analysis with noon meal consisting of soft/bite size chicken, carrots, mashed potatoes and thin liquids. Patient reporting increased success with textural breakdown and management of softer solid consistencies since diet modification yesterday. Reduced oral residuals evident, however patient continuing to require occasional cues for liquid was and lingual sweep to ensure complete clearance. No cough, throat clear or change in vocal quality evident to indicate airway invasion episodes.     Provided education to family on rationale for recent diet change, s/s of aspiration/pharyngeal swallow impairment to monitor for and recommended positional/compensatory strategies. Referenced swallow guide in the room reviewing each strategy at length.  reporting

## 2024-06-13 NOTE — PROGRESS NOTES
Patient seems very sleepy. Taking medication with sips of water. Telesitter present ;no attempts to get out of bed.

## 2024-06-14 LAB
ANION GAP SERPL CALC-SCNC: 13 MEQ/L (ref 8–16)
BUN SERPL-MCNC: 45 MG/DL (ref 7–22)
CALCIUM SERPL-MCNC: 9.2 MG/DL (ref 8.5–10.5)
CHLORIDE SERPL-SCNC: 101 MEQ/L (ref 98–111)
CO2 SERPL-SCNC: 21 MEQ/L (ref 23–33)
CREAT SERPL-MCNC: 4.7 MG/DL (ref 0.4–1.2)
GFR SERPL CREATININE-BSD FRML MDRD: 9 ML/MIN/1.73M2
GLUCOSE SERPL-MCNC: 75 MG/DL (ref 70–108)
POTASSIUM SERPL-SCNC: 5 MEQ/L (ref 3.5–5.2)
SODIUM SERPL-SCNC: 135 MEQ/L (ref 135–145)

## 2024-06-14 PROCEDURE — 6360000002 HC RX W HCPCS: Performed by: PHYSICAL MEDICINE & REHABILITATION

## 2024-06-14 PROCEDURE — 97110 THERAPEUTIC EXERCISES: CPT

## 2024-06-14 PROCEDURE — 80048 BASIC METABOLIC PNL TOTAL CA: CPT

## 2024-06-14 PROCEDURE — 6370000000 HC RX 637 (ALT 250 FOR IP): Performed by: PHYSICAL MEDICINE & REHABILITATION

## 2024-06-14 PROCEDURE — 92526 ORAL FUNCTION THERAPY: CPT | Performed by: SPEECH-LANGUAGE PATHOLOGIST

## 2024-06-14 PROCEDURE — 6370000000 HC RX 637 (ALT 250 FOR IP): Performed by: INTERNAL MEDICINE

## 2024-06-14 PROCEDURE — 97116 GAIT TRAINING THERAPY: CPT

## 2024-06-14 PROCEDURE — 36415 COLL VENOUS BLD VENIPUNCTURE: CPT

## 2024-06-14 PROCEDURE — 97535 SELF CARE MNGMENT TRAINING: CPT

## 2024-06-14 PROCEDURE — 99232 SBSQ HOSP IP/OBS MODERATE 35: CPT | Performed by: INTERNAL MEDICINE

## 2024-06-14 PROCEDURE — 99232 SBSQ HOSP IP/OBS MODERATE 35: CPT | Performed by: PHYSICAL MEDICINE & REHABILITATION

## 2024-06-14 PROCEDURE — 1180000000 HC REHAB R&B

## 2024-06-14 PROCEDURE — 97542 WHEELCHAIR MNGMENT TRAINING: CPT

## 2024-06-14 PROCEDURE — 97530 THERAPEUTIC ACTIVITIES: CPT

## 2024-06-14 PROCEDURE — 90935 HEMODIALYSIS ONE EVALUATION: CPT

## 2024-06-14 RX ORDER — TRAZODONE HYDROCHLORIDE 100 MG/1
100 TABLET ORAL NIGHTLY
Qty: 30 TABLET | Refills: 3 | Status: SHIPPED | OUTPATIENT
Start: 2024-06-14

## 2024-06-14 RX ORDER — LEVETIRACETAM 500 MG/1
500 TABLET ORAL DAILY
Qty: 90 TABLET | Refills: 1 | Status: SHIPPED | OUTPATIENT
Start: 2024-06-15

## 2024-06-14 RX ORDER — PSEUDOEPHEDRINE HCL 30 MG
100 TABLET ORAL 2 TIMES DAILY
Qty: 60 CAPSULE | Refills: 3 | Status: SHIPPED | OUTPATIENT
Start: 2024-06-14

## 2024-06-14 RX ORDER — CETIRIZINE HYDROCHLORIDE 5 MG/1
5 TABLET ORAL DAILY
Qty: 5 TABLET | Refills: 0 | Status: SHIPPED | OUTPATIENT
Start: 2024-06-15 | End: 2024-06-20

## 2024-06-14 RX ORDER — AMLODIPINE BESYLATE 10 MG/1
10 TABLET ORAL DAILY
Qty: 30 TABLET | Refills: 3 | Status: SHIPPED | OUTPATIENT
Start: 2024-06-15

## 2024-06-14 RX ORDER — SENNOSIDES A AND B 8.6 MG/1
1 TABLET, FILM COATED ORAL NIGHTLY
Qty: 30 TABLET | Refills: 3 | Status: SHIPPED | OUTPATIENT
Start: 2024-06-14

## 2024-06-14 RX ADMIN — BUPROPION HYDROCHLORIDE 300 MG: 300 TABLET, EXTENDED RELEASE ORAL at 22:02

## 2024-06-14 RX ADMIN — AMLODIPINE BESYLATE 10 MG: 10 TABLET ORAL at 10:14

## 2024-06-14 RX ADMIN — ACETAMINOPHEN 650 MG: 325 TABLET ORAL at 18:22

## 2024-06-14 RX ADMIN — CETIRIZINE HYDROCHLORIDE 5 MG: 5 TABLET ORAL at 10:13

## 2024-06-14 RX ADMIN — SALINE NASAL SPRAY 1 SPRAY: 1.5 SOLUTION NASAL at 05:44

## 2024-06-14 RX ADMIN — SALINE NASAL SPRAY 1 SPRAY: 1.5 SOLUTION NASAL at 10:15

## 2024-06-14 RX ADMIN — SENNOSIDES 8.6 MG: 8.6 TABLET ORAL at 22:09

## 2024-06-14 RX ADMIN — CARBIDOPA AND LEVODOPA 1 TABLET: 10; 100 TABLET ORAL at 16:11

## 2024-06-14 RX ADMIN — DOCUSATE SODIUM 100 MG: 100 CAPSULE, LIQUID FILLED ORAL at 10:14

## 2024-06-14 RX ADMIN — LEVETIRACETAM 500 MG: 500 TABLET, FILM COATED ORAL at 10:14

## 2024-06-14 RX ADMIN — SEVELAMER CARBONATE 800 MG: 800 TABLET, FILM COATED ORAL at 10:14

## 2024-06-14 RX ADMIN — PRIMIDONE 50 MG: 50 TABLET ORAL at 10:13

## 2024-06-14 RX ADMIN — SALINE NASAL SPRAY 1 SPRAY: 1.5 SOLUTION NASAL at 22:02

## 2024-06-14 RX ADMIN — CARBIDOPA AND LEVODOPA 1 TABLET: 10; 100 TABLET ORAL at 10:43

## 2024-06-14 RX ADMIN — HEPARIN SODIUM 5000 UNITS: 5000 INJECTION INTRAVENOUS; SUBCUTANEOUS at 22:02

## 2024-06-14 RX ADMIN — SALINE NASAL SPRAY 1 SPRAY: 1.5 SOLUTION NASAL at 18:23

## 2024-06-14 RX ADMIN — TRAZODONE HYDROCHLORIDE 100 MG: 100 TABLET ORAL at 22:02

## 2024-06-14 RX ADMIN — CARBIDOPA AND LEVODOPA 1 TABLET: 10; 100 TABLET ORAL at 05:44

## 2024-06-14 RX ADMIN — BUPROPION HYDROCHLORIDE 150 MG: 150 TABLET, EXTENDED RELEASE ORAL at 10:14

## 2024-06-14 RX ADMIN — HEPARIN SODIUM 5000 UNITS: 5000 INJECTION INTRAVENOUS; SUBCUTANEOUS at 10:19

## 2024-06-14 RX ADMIN — BENZOCAINE 6 MG-MENTHOL 10 MG LOZENGES 1 LOZENGE: at 00:35

## 2024-06-14 RX ADMIN — BENZOCAINE 6 MG-MENTHOL 10 MG LOZENGES 1 LOZENGE: at 05:56

## 2024-06-14 RX ADMIN — DOCUSATE SODIUM 100 MG: 100 CAPSULE, LIQUID FILLED ORAL at 22:02

## 2024-06-14 RX ADMIN — SERTRALINE HYDROCHLORIDE 50 MG: 50 TABLET ORAL at 10:14

## 2024-06-14 RX ADMIN — SEVELAMER CARBONATE 800 MG: 800 TABLET, FILM COATED ORAL at 16:11

## 2024-06-14 RX ADMIN — MEGESTROL ACETATE 40 MG: 40 TABLET ORAL at 10:13

## 2024-06-14 ASSESSMENT — PAIN SCALES - GENERAL: PAINLEVEL_OUTOF10: 0

## 2024-06-14 NOTE — PLAN OF CARE
Problem: Discharge Planning  Goal: Discharge to home or other facility with appropriate resources  6/14/2024 1046 by Kusum Guajardo LISW  Note: Patient to be discharged on Saturday, 6/15 to home. Patient will be under the supervision of her spouse, Tenzin. Family education was provided throughout patient's stay. Patient will be receiving services through [facility]. VARSHA provided information to [facility] on 6/10 via Epic. VARSHA spoke with Lexis on this date to confirm patient's discharge.

## 2024-06-14 NOTE — PROGRESS NOTES
06/12/24  0417   WBC 6.5   HGB 10.3*   HCT 34.9*          CMP:  Recent Labs     06/12/24  0417 06/14/24  0331    135   K 5.9* 5.0    101   CO2 18* 21*   BUN 60* 45*   CREATININE 5.4* 4.7*   GLUCOSE 79 75   CALCIUM 9.1 9.2       Hepatic:   No results for input(s): \"LABALBU\", \"AST\", \"ALT\", \"BILITOT\", \"ALKPHOS\" in the last 72 hours.    Invalid input(s): \"ALB\"      Assessment and Plan:  ESRD on HD MWF  Volume status and electrolytes reasonable.   Electrolytes stable as well we will get dialyzed today  Subdural hematoma 2/2 fall  Essential hypertension running better  Electrolytes normal limits  Recurrent falls  Secondary hyperparathyroidism doing well  Elevated trop  Meds reviewed discussed with the patient for potential discharge tomorrow    To Cuadra MD  [facility]    This report has been created using voice recognition software. It may contain minor errors which are inherent in voice recognition technology

## 2024-06-14 NOTE — PROGRESS NOTES
[facility]  Occupational Therapy  Daily Note  Time:   Time In: 07  Time Out: 0830  Timed Code Treatment Minutes: 70 Minutes  Minutes: 70    Date: 2024  Patient Name: Deloris Watts,   Gender: female      Room: [facility]13/013  MRN: [id number]  : 1955  (68 y.o.)  Referring Practitioner: Dr. ZULEMA Whatley  Diagnosis: Subdural hematoma  Additional Pertinent Hx: Deloris Watts is a 68 y.o.  right-handed slim  female with history of hypertension, fibromyalgia, irritable bowel syndrome, essential tremor, end-stage renal disease on hemodialysis (MWF) since 2023, stroke with left side weakness in , depression, anxiety, right wrist and right ankle fracture treated conservatively, right proximal humeral fracture due to fall requiring ORIF, L2 compression fracture requiring kyphoplasty, LASEK surgery, left femur neck displaced fracture due to fall on 3/10/2024 requiring left hip hemiarthroplasty on 3/11/2024, was admitted on 2024 for intensive inpatient management of impairment & disability secondary to fall resulting acute left parafalcine subdural hematoma. Pt sustained a fall at home and brought to the hospital. CT of the head done on 2024 revealed small acute left parafalcine subdural hematoma.  Neurosurgery was consulted on 2024 for the acute subdural hematoma. A dose of tranexamic acid (TXA) was given.  No acute neurosurgical intervention was advised. Pt admitted to [facility] to regain strength, endurance and balance to improve indep with self care before returning home with spouse.    Restrictions/Precautions:  Restrictions/Precautions: Fall Risk, General Precautions, Seizure, Bed Alarm  Position Activity Restriction  Other position/activity restrictions: History of L hemiarthroplasty 3/11/24.  Perfect serve to Dr. Lezama sent  to clarify if patient continues with hip precautions- Per Dr. Lezama, no YONG precautions.

## 2024-06-14 NOTE — PROGRESS NOTES
Layout: One level  Home Access: Stairs to enter with rails  Entrance Stairs - Number of Steps: 1 MARIANA.  Pt has a ramp in the garage to enter the house.  pt's spouse uses a transport chair to assist patient in/out of house  Entrance Stairs - Rails: Left  Home Equipment: Lift chair, Walker - 4-Wheeled, Wheelchair - Manual, Walker - Rolling, Reacher, Grab bars (transport wheelchair)   Bathroom Shower/Tub: Tub/Shower unit, Shower chair with back, Curtain  Bathroom Toilet: Standard  Bathroom Equipment: Grab bars in shower, Shower chair  Bathroom Accessibility: Accessible    Receives Help From: Family  ADL Assistance: Needs assistance  Homemaking Assistance: Needs assistance  Ambulation Assistance: Needs assistance  Transfer Assistance: Needs assistance  Active : No  Additional Comments: Pt unable to recall home invormation relaiable, so her spouse provided PLOF. Pt's spouse reports patient was using a rollator at home and has someone walking with her at all times. He states she requires guidance and some light assist with ADLs at home. Over past few weeks, patient required increased physical assist with transfers and mobility. Pt and spouse report patient has someone with her at all times and if spouse leaves, he ensures someone is with her. Pt's spouse completes all IADLs at home.    Restrictions/Precautions:  Restrictions/Precautions: Fall Risk, General Precautions, Seizure, Bed Alarm  Position Activity Restriction  Other position/activity restrictions: History of L hemiarthroplasty 3/11/24.  Perfect serve to Dr. Lezama sent 5/30 to clarify if patient continues with hip precautions- Per Dr. Lezama, no YONG precautions.    SUBJECTIVE: Pt finished up occupational therapy and met PT for session in gym. She expressed excitement for return home with . Provided education about having SBA to minimal assist for mobility in home for her safety. Pt denied having questions or concerns at this time for upcoming  Flexed Posture, Slow Anna, Decreased Step Length Bilaterally, Narrow Base of Support, Mild Path Deviations, and Unsteady Gait  Pt CGA for ramp due to not remaining in RW boundaries and increased reliance on RW. Provide cue for remaining in boundaries and stride length prior to performing ramp with fair carry over.    Stairs:  Contact Guard Assistance  Number of Steps: 1 step, 4 steps,  Height: 4\" step with parallel bars    Pt with increased time to complete, increased reliance on parallel bars however navigated steps with no more than CGA and minimal cuing.     Wheelchair Mobility:  Minimal Assistance  Extremities Used: Bilateral Upper Extremities  Type of Chair:Manual  Surface: Level Tile  Distance: 50' with two turns, 150'  Quality: Slow Velocity, Short Strokes, Veers Left, and Decreased Fluidity   Pt with cues for larger strokes and redirection from distractions. Pt veers left with wheelchair with increased difficulty propelling chair without intervention.    Balance:  Static Sitting Balance:  Stand By Assistance  Dynamic Sitting Balance: Stand By Assistance  Static Standing Balance: Stand By Assistance  Dynamic Standing Balance: Contact Guard Assistance  CGA for picking up object with reacher.    Exercise:  Patient was guided in 1 set(s) 10 reps of exercise to both lower extremities.  Ankle pumps, Seated marches, Seated hamstring curls, Seated heel/toe raises, Long arc quads, and Seated isometric hip adduction.  Exercises were completed for increased independence with functional mobility.  Provided HEP handout to patient with instructions on completion. Provided education on importance of adherence to HEP for continued progress in functional mobility      Functional Outcome Measures:  Completed.  IRF DONALD Completed  Tinetti Balance Completed  Balance Score: 11  Gait Score: 6  Total score: 17, high risk for falls  Risk Indicators:  Less than/equal to 18 = high risk  19-23 Moderate risk  Greater than/equal to 24

## 2024-06-14 NOTE — FLOWSHEET NOTE
Stable 2.5 hour TX completed. Removed 500 ml of fluid. pt tolerated fluid removal well. Bilateral cath ports flushed with normal saline, clamped, and secured with tegos. CVC drsg clean, dry, and intact. Report called to primary RN. TX record printed for scanning into EMR.   06/14/24 1117 06/14/24 1403   Vital Signs   BP (!) 162/66 (!) 158/82   Temp 98.1 °F (36.7 °C) 97.8 °F (36.6 °C)   Pulse 86 94   Respirations 18 18   SpO2 100 % 100 %   Weight - Scale 41.9 kg (92 lb 6 oz) 41.4 kg (91 lb 4.3 oz)   Weight Method Bed scale Bed scale   Percent Weight Change -3.68 -1.19   Post-Hemodialysis Assessment   Post-Treatment Procedures  --  Blood returned;Catheter Capped, clamped with Saline x2 ports   Machine Disinfection Process  --  Acid/Vinegar Clean;Heat Disinfect;Exterior Machine Disinfection   Blood Volume Processed (Liters)  --  56.8 L   Dialyzer Clearance  --  Lightly streaked   Duration of Treatment (minutes)  --  150 minutes   Heparin Amount Administered During Treatment (mL)  --  0 mL   Hemodialysis Intake (ml)  --  400 ml   Hemodialysis Output (ml)  --  900 ml   NET Removed (ml)  --  500   Tolerated Treatment  --  Good

## 2024-06-14 NOTE — DISCHARGE SUMMARY
ON EVEN SURFACES:Supervision or touching assistance         NAVIGATE 1 STEP:Supervision or touching assistance          NAVIGATE 4 STEPS:Supervision or touching assistance        NAVIGATE 12 STEPS:       Not attempted due to medical condition or safety concerns  PROPEL WHEELCHAIR 50 FEET WITH 2 TURNS:         PROPEL WHEELCHAIR 150 FEET:Partial/moderate assistance    .            .       Occupational therapy:  COGNITION: Slow Processing, Decreased Insight, and Decreased Safety Awareness     ADL:   Feeding: Modified Independent.  Seated in bedside chair  .   ORAL HYGIENE:Supervision or touching assistance.  close SBA while standing at sink.  CARE Score: 4.   Grooming:  Pt. Required cues to initiate hand washing s/p toileting task.  Pt. Required cues to properly read labels of containers to ensure utilizing soap.  Pt. Then donned soap to hands and rubbed hands together and attempted to grab walker as she was finished with the task.  Pt. Then required cues to turn on water and rinse soap off of hands, pt able to complete task with overall SBA.    TUB TRANSFER: Close SBA to intermittent CGA with skilled education on safe technique, dry transfer for education   SHOWERING/BATHING:Supervision or touching assistance.  SBA while standing with unilateral release/support,  mod I while seated, pt able to initiate.  CARE Score: 4.     UPPER BODY DRESSING:Independent.  donning t-shirt and bra.  CARE Score: 6.     LOWER BODY DRESSING:Supervision or touching assistance.  close SBA while standing to manage over hips, increased time required for clothing management..  CARE Score: 4.     FOOTWEAR:Partial/moderate assistance   .  CARE Score: 3.     Toileting: SBA to manage clothing, pt wasn't sure if she needed to void but wanted to attempt prior to dialysis.  No ortiz care completed as pt did not need to void.   Toilet Transfer: Stand By Assistance and with verbal cues for hand placement.    BALANCE:  Sitting Balance:  Modified  3/11/2024, was admitted to [facility] on 5/25/2024 due to acute left parafalcine subdural hematoma as result of a fall accident on 5/25/2024.    The patient is known to inpatient rehab service.  She was previously in our inpatient rehab service from 12/21/2023 to 1/5/2024 after a fall accident on 12/14/2023 resulting L2 compression fracture requiring kyphoplasty, and liver laceration.  She was discharged to home on 1/5/2024 with referral for outpatient PT, OT and speech therapy.  She again sustained a fall accident at home bathroom on 3/10/2024 resulting left femur neck displaced fracture requiring left hip hemiarthroplasty done on 3/11/2024.  She then was admitted to inpatient rehab service again on 3/14/2024.  She was discharged home on 3/30/2024 with referral for outpatient PT and OT treatment after discharge.     The patient's  says he assisted the patient to sit on her rollator walker seat in living room and then pushing the rollator walker to bring the patient back to bedroom on 5/26/2024.  When they were near the bedroom door on the hallway, the patient suddenly slipped off the rollator walker seat and fell down to the floor with her head hitting the carpeted floor of the bedroom entrance.  She did not loss consciousness but felt some headache afterwards.  Her  called 911 and the patient was sent to [facility] ER for evaluation.  X-ray of pelvis and left femur performed on 5/26/2024 did not show any acute finding.  CT of the head done on 5/26/2024 revealed small acute left parafalcine subdural hematoma.  Cervical spine CT on 5/26/2024 revealed no cervical spine injury.  Neurosurgery was consulted on 5/26/2024 for the acute subdural hematoma. A dose of tranexamic acid (TXA) was given.  No acute neurosurgical intervention was advised.  Neurosurgery note stated that Lovenox can be started on 5/27/2024 for DVT prophylaxis.  CT of head was repeated later on 5/26/2024 and

## 2024-06-14 NOTE — PLAN OF CARE
Problem: Discharge Planning  Goal: Discharge to home or other facility with appropriate resources  6/13/2024 2356 by Yessica Fu LPN  Outcome: Progressing  Flowsheets  Taken 6/13/2024 2356 by Yessica Fu LPN  Discharge to home or other facility with appropriate resources:   Identify barriers to discharge with patient and caregiver   Identify discharge learning needs (meds, wound care, etc)    Problem: Safety - Adult  Goal: Free from fall injury  6/13/2024 2356 by Yessica Fu LPN  Outcome: Progressing  Flowsheets  Taken 6/13/2024 2356 by Yessica Fu LPN  Free From Fall Injury: Instruct family/caregiver on patient safety    Problem: ABCDS Injury Assessment  Goal: Absence of physical injury  6/13/2024 2356 by Yessica Fu LPN  Outcome: Progressing  Flowsheets  Taken 6/13/2024 2356 by Yessica Fu LPN  Absence of Physical Injury: Implement safety measures based on patient assessment    Problem: Neurosensory - Adult  Goal: Absence of seizures  Outcome: Progressing     Problem: Musculoskeletal - Adult  Goal: Return mobility to safest level of function  Outcome: Progressing  Flowsheets (Taken 6/13/2024 2356)  Return Mobility to Safest Level of Function: Assess patient stability and activity tolerance for standing, transferring and ambulating with or without assistive devices     Problem: Genitourinary - Adult  Goal: Absence of urinary retention  Outcome: Progressing  Flowsheets (Taken 6/13/2024 2356)  Absence of urinary retention: Assess patient’s ability to void and empty bladder     Problem: Infection - Adult  Goal: Absence of infection at discharge  6/13/2024 2356 by Yessica Fu LPN  Outcome: Progressing  Flowsheets  Taken 6/13/2024 2356 by Yessica Fu LPN  Absence of infection at discharge:   Assess and monitor for signs and symptoms of infection   Monitor lab/diagnostic results    Problem: Infection - Adult  Goal: Absence of infection during

## 2024-06-14 NOTE — PLAN OF CARE
Problem: Discharge Planning  Goal: Discharge to home or other facility with appropriate resources  6/14/2024 1047 by Kusum Guajardo LISW  Note: Transportation:   Has transportation kept you from medical appointments, meetings, work, or from getting things needed for daily living? (Check all that apply)  No.      Health Literacy:   How often do you need to have someone help you when you read instructions, pamphlets, or other written material from your doctor or pharmacy?  0. - Never    Social Isolation:  How often do you feel lonely or isolated from those around you?  2.  Sometimes      Patient Mood Interview (PHQ-2 to 9) (from [employer]©)   Say to Patient: \"Over the last 2 weeks, have you been bothered by any of the following problems?\"   If symptom is present, enter 1 (yes) in column 1 (Symptom Presence)  If yes in column 1, then ask the patient: “About how often have you been bothered by this?”  Read and show the patient a card with the symptom frequency choices.    Indicate response in column 2, Symptom Frequency.   Symptom Presence  No (enter 0 in column 2)   Yes (enter 0-3 in column 2)  9.    No response (leave column 2 blank) Symptom Frequency  Never or 1 day  2-6 days (several days)  7-11 days (half or more of the days)  12-14 days (nearly every day    Symptom Presence Symptom Frequency   Little interest or pleasure in doing things 0. No 0. Never or 1 day   Feeling down, depressed, or hopeless 1. Yes 3. 12-14 days (nearly every day)        If either A or B above has symptom frequency coded 2 or 3, CONTINUE asking questions below.  If not, END the interview   Trouble falling or staying asleep, or sleeping too much 0. No 0. Never or 1 day   Feeling tired or having little energy 1. Yes 3. 12-14 days (nearly every day)   Poor appetite or overeating 0. No 0. Never or 1 day   Feeling bad about yourself-or that you are a failure or have let yourself or your family down 1. Yes 1. 2-6 days (several days)

## 2024-06-14 NOTE — PROGRESS NOTES
[facility]  Occupational Therapy  Daily Note  Time:    Time In: 1000  Time Out: 1020  Timed Code Treatment Minutes: 20 Minutes  Minutes: 20          Date: 2024  Patient Name: Deloris Watts,   Gender: female      Room: Pending sale to [facility]13/013-A  MRN: [id number]  : 1955  (68 y.o.)  Referring Practitioner: Dr. ZULEMA Whatley  Diagnosis: Subdural hematoma  Additional Pertinent Hx: Deloris Watts is a 68 y.o.  right-handed slim  female with history of hypertension, fibromyalgia, irritable bowel syndrome, essential tremor, end-stage renal disease on hemodialysis (MWF) since 2023, stroke with left side weakness in , depression, anxiety, right wrist and right ankle fracture treated conservatively, right proximal humeral fracture due to fall requiring ORIF, L2 compression fracture requiring kyphoplasty, LASEK surgery, left femur neck displaced fracture due to fall on 3/10/2024 requiring left hip hemiarthroplasty on 3/11/2024, was admitted on 2024 for intensive inpatient management of impairment & disability secondary to fall resulting acute left parafalcine subdural hematoma. Pt sustained a fall at home and brought to the hospital. CT of the head done on 2024 revealed small acute left parafalcine subdural hematoma.  Neurosurgery was consulted on 2024 for the acute subdural hematoma. A dose of tranexamic acid (TXA) was given.  No acute neurosurgical intervention was advised. Pt admitted to [facility] to regain strength, endurance and balance to improve indep with self care before returning home with spouse.    Restrictions/Precautions:  Restrictions/Precautions: Fall Risk, General Precautions, Seizure, Bed Alarm  Position Activity Restriction  Other position/activity restrictions: History of L hemiarthroplasty 3/11/24.  Perfect serve to Dr. Lezama sent  to clarify if patient continues with hip precautions- Per Dr. Lezama, no YONG  needs.  Plan: Times Per Week: 5x/wk for 90 min  Current Treatment Recommendations: Strengthening, Patient/Caregiver education & training, Balance training, Functional mobility training, Self-Care / ADL, Endurance training, Equipment evaluation, education, & procurement, Safety education & training, Gait training, Neuromuscular re-education    Education:  Learners: Patient and Significant Other  ADL's    Goals  Short Term Goals  Time Frame for Short Term Goals: 1 week  Short Term Goal 1: Pt will demo improved standing tolerance up to 5 min SBA with 1-2 hand release to maximize performance with ADLs.  Short Term Goal 2: Pt will sequence and don UB clothing given SBA for task and sitting balance to maximize performace with ADLs.  Short Term Goal 3: Pt will complete LB dressing tasks with CGA using AE prn to improve indep with daily dressing tasks.  Short Term Goal 4: Pt will alighn self to surface (toilet, chair, wheelchair) with SBA and min vcs for positioning to decrease fall risk during transfers.  Short Term Goal 5: Pt will improve sitting balance as evidenced by engaging in seated tasks for 10 minutes with SBA and no cues to reposition to midline to enhance performance with seated ADLs.  Long Term Goals  Time Frame for Long Term Goals : 3 weeks from OT evaluation  Long Term Goal 1: Pt will complete tub/shower transfers with CGA using least restrictive AD to return to completing sponge baths in shower at home.  Long Term Goal 2: Pt will sequence/ problem solve bathing and dressing tasks with overall SBA to improve indep with daily dressing tasks.  Long Term Goal 3: Pt will navigate RW to/ from bathroom with SBA to enhance performace with bathroom related tasks.  Long Term Goal 4: Pt will improve UB strength to 4+/5 to improve global strength needed for ADL performance.    Following session, patient left in safe position with all fall risk precautions in place.

## 2024-06-15 VITALS
WEIGHT: 91.27 LBS | OXYGEN SATURATION: 100 % | SYSTOLIC BLOOD PRESSURE: 133 MMHG | DIASTOLIC BLOOD PRESSURE: 61 MMHG | BODY MASS INDEX: 17.92 KG/M2 | HEIGHT: 60 IN | HEART RATE: 98 BPM | RESPIRATION RATE: 16 BRPM | TEMPERATURE: 98.1 F

## 2024-06-15 LAB
ANION GAP SERPL CALC-SCNC: 11 MEQ/L (ref 8–16)
BUN SERPL-MCNC: 36 MG/DL (ref 7–22)
CALCIUM SERPL-MCNC: 9.1 MG/DL (ref 8.5–10.5)
CHLORIDE SERPL-SCNC: 104 MEQ/L (ref 98–111)
CO2 SERPL-SCNC: 23 MEQ/L (ref 23–33)
CREAT SERPL-MCNC: 3.4 MG/DL (ref 0.4–1.2)
GFR SERPL CREATININE-BSD FRML MDRD: 14 ML/MIN/1.73M2
GLUCOSE SERPL-MCNC: 78 MG/DL (ref 70–108)
POTASSIUM SERPL-SCNC: 4.8 MEQ/L (ref 3.5–5.2)
SODIUM SERPL-SCNC: 138 MEQ/L (ref 135–145)

## 2024-06-15 PROCEDURE — 99239 HOSP IP/OBS DSCHRG MGMT >30: CPT | Performed by: PHYSICAL MEDICINE & REHABILITATION

## 2024-06-15 PROCEDURE — 97535 SELF CARE MNGMENT TRAINING: CPT

## 2024-06-15 PROCEDURE — 97530 THERAPEUTIC ACTIVITIES: CPT

## 2024-06-15 PROCEDURE — 36415 COLL VENOUS BLD VENIPUNCTURE: CPT

## 2024-06-15 PROCEDURE — 6370000000 HC RX 637 (ALT 250 FOR IP): Performed by: INTERNAL MEDICINE

## 2024-06-15 PROCEDURE — 97110 THERAPEUTIC EXERCISES: CPT

## 2024-06-15 PROCEDURE — 6360000002 HC RX W HCPCS: Performed by: PHYSICAL MEDICINE & REHABILITATION

## 2024-06-15 PROCEDURE — 80048 BASIC METABOLIC PNL TOTAL CA: CPT

## 2024-06-15 PROCEDURE — 6370000000 HC RX 637 (ALT 250 FOR IP): Performed by: PHYSICAL MEDICINE & REHABILITATION

## 2024-06-15 RX ADMIN — PRIMIDONE 50 MG: 50 TABLET ORAL at 09:14

## 2024-06-15 RX ADMIN — SALINE NASAL SPRAY 1 SPRAY: 1.5 SOLUTION NASAL at 09:15

## 2024-06-15 RX ADMIN — MEGESTROL ACETATE 40 MG: 40 TABLET ORAL at 09:14

## 2024-06-15 RX ADMIN — CARBIDOPA AND LEVODOPA 1 TABLET: 10; 100 TABLET ORAL at 06:35

## 2024-06-15 RX ADMIN — SERTRALINE HYDROCHLORIDE 50 MG: 50 TABLET ORAL at 09:14

## 2024-06-15 RX ADMIN — BUPROPION HYDROCHLORIDE 150 MG: 150 TABLET, EXTENDED RELEASE ORAL at 09:14

## 2024-06-15 RX ADMIN — LEVETIRACETAM 500 MG: 500 TABLET, FILM COATED ORAL at 09:14

## 2024-06-15 RX ADMIN — SALINE NASAL SPRAY 1 SPRAY: 1.5 SOLUTION NASAL at 06:35

## 2024-06-15 RX ADMIN — DOCUSATE SODIUM 100 MG: 100 CAPSULE, LIQUID FILLED ORAL at 09:15

## 2024-06-15 RX ADMIN — HEPARIN SODIUM 5000 UNITS: 5000 INJECTION INTRAVENOUS; SUBCUTANEOUS at 09:15

## 2024-06-15 RX ADMIN — AMLODIPINE BESYLATE 10 MG: 10 TABLET ORAL at 09:14

## 2024-06-15 RX ADMIN — ACETAMINOPHEN 650 MG: 325 TABLET ORAL at 06:35

## 2024-06-15 RX ADMIN — SEVELAMER CARBONATE 800 MG: 800 TABLET, FILM COATED ORAL at 09:14

## 2024-06-15 RX ADMIN — CETIRIZINE HYDROCHLORIDE 5 MG: 5 TABLET ORAL at 09:15

## 2024-06-15 ASSESSMENT — PAIN SCALES - GENERAL: PAINLEVEL_OUTOF10: 0

## 2024-06-15 NOTE — PROGRESS NOTES
Patient: Deloris Watts  Unit/Bed: 8K-13/013-A  YOB: 1955  MRN: [id number] Acct: [id number]   Admitting Diagnosis: Subdural hematoma (HCC) [S06.5XAA]  Admit Date:  5/29/2024  Hospital Day: 16    Assessment:     Principal Problem:    Subdural hematoma, acute (HCC)  Active Problems:    Anemia due to chronic kidney disease, on chronic dialysis (HCC)    Depression    Allergic rhinitis    Essential hypertension    Chronic kidney disease with in-center hemodialysis preferred by patient    Essential tremor    Cerebral concussion    Impaired cognition    Severe malnutrition (HCC)    Parkinsonism (HCC)  Resolved Problems:    * No resolved hospital problems. *      Plan:     Continue to follow        Subjective:     Patient has no complaint of CP or SOB..   Medication side effects: none    Scheduled Meds:   carbidopa-levodopa  1 tablet Oral TID    sodium chloride  1 spray Each Nostril Q4H While awake    cetirizine  5 mg Oral Daily    traZODone  100 mg Oral Nightly    docusate sodium  100 mg Oral BID    amLODIPine  10 mg Oral Daily    megestrol  40 mg Oral Daily    buPROPion  150 mg Oral QAM    buPROPion  300 mg Oral Nightly    primidone  50 mg Oral Daily    sertraline  50 mg Oral Daily    sevelamer  800 mg Oral TID WC    levETIRAcetam  500 mg Oral Daily    senna  1 tablet Oral Nightly    acetaminophen  650 mg Oral Q6H    heparin (porcine)  5,000 Units SubCUTAneous 2 times per day     Continuous Infusions:   sodium chloride       PRN Meds:Benzocaine-Menthol, loperamide, linaclotide, bisacodyl, ondansetron, sodium chloride flush, sodium chloride, acetaminophen, hydrALAZINE, magnesium hydroxide, polyethylene glycol    Review of Systems  Pertinent items are noted in HPI.    Objective:     Patient Vitals for the past 8 hrs:   BP Temp Temp src Pulse Resp SpO2 Weight   06/14/24 1435 (!) 144/68 98.1 °F (36.7 °C) Oral 98 17 100 % --   06/14/24 1403 (!) 158/82 97.8 °F (36.6 °C) -- 94 18 100 % 41.4 kg (91 lb 4.3 oz)

## 2024-06-15 NOTE — PROGRESS NOTES
Patient discharged in stable condition as per order of attending physician to Home per staff at Time: 1100 and Via Wheelchair to car.    AVS provided by LPN at time of discharge, which includes all necessary medical information pertaining to the patients current course of illness, treatment, medications, post-discharge goals of care, and treatment preferences.     Patient/ family verbalize understanding of discharge plan and are in agreement with goal/plan/treatment preferences.     Belongings including  personal care items, glasses, cell phone/, clothing  sent with patient.      Home medications sent home with patient yes    Availability of \"My Chart\" offered to patient as a tool for updated health record.  Steps for activation discussed with patient as mentioned on AVS.

## 2024-06-15 NOTE — PROGRESS NOTES
[facility]  Inpatient Rehabilitation  Occupational Therapy  Discharge Note  Time:  Time In: 0835  Time Out: 0900  Timed Code Treatment Minutes: 25 Minutes  Minutes: 25          Date: 6/15/2024  Patient Name: Deloris Watts,   Gender: female      Room: [facility]13/013-A  MRN: [id number]  : 1955  (68 y.o.)  Referring Practitioner: Dr. ZULEMA Whatley  Diagnosis: Subdural hematoma  Additional Pertinent Hx: Deloris Watts is a 68 y.o.  right-handed slim  female with history of hypertension, fibromyalgia, irritable bowel syndrome, essential tremor, end-stage renal disease on hemodialysis (MWF) since 2023, stroke with left side weakness in , depression, anxiety, right wrist and right ankle fracture treated conservatively, right proximal humeral fracture due to fall requiring ORIF, L2 compression fracture requiring kyphoplasty, LASEK surgery, left femur neck displaced fracture due to fall on 3/10/2024 requiring left hip hemiarthroplasty on 3/11/2024, was admitted on 2024 for intensive inpatient management of impairment & disability secondary to fall resulting acute left parafalcine subdural hematoma. Pt sustained a fall at home and brought to the hospital. CT of the head done on 2024 revealed small acute left parafalcine subdural hematoma.  Neurosurgery was consulted on 2024 for the acute subdural hematoma. A dose of tranexamic acid (TXA) was given.  No acute neurosurgical intervention was advised. Pt admitted to [facility] to regain strength, endurance and balance to improve indep with self care before returning home with spouse.    Restrictions/Precautions:  Restrictions/Precautions: Fall Risk, General Precautions, Seizure, Bed Alarm  Position Activity Restriction  Other position/activity restrictions: History of L hemiarthroplasty 3/11/24.  Perfect serve to Dr. Lezama sent  to clarify if patient continues with hip precautions- Per Dr. Lezama, no YONG

## 2024-06-15 NOTE — PLAN OF CARE
Problem: Discharge Planning  Goal: Discharge to home or other facility with appropriate resources  Outcome: Progressing     Problem: Safety - Adult  Goal: Free from fall injury  Outcome: Progressing     Problem: ABCDS Injury Assessment  Goal: Absence of physical injury  Outcome: Progressing     Problem: Skin/Tissue Integrity  Goal: Absence of new skin breakdown  Description: 1.  Monitor for areas of redness and/or skin breakdown  2.  Assess vascular access sites hourly  3.  Every 4-6 hours minimum:  Change oxygen saturation probe site  4.  Every 4-6 hours:  If on nasal continuous positive airway pressure, respiratory therapy assess nares and determine need for appliance change or resting period.  Outcome: Progressing     Problem: Neurosensory - Adult  Goal: Absence of seizures  Outcome: Progressing     Problem: Skin/Tissue Integrity - Adult  Goal: Skin integrity remains intact  Outcome: Progressing     Problem: Musculoskeletal - Adult  Goal: Return mobility to safest level of function  Outcome: Progressing     Problem: Gastrointestinal - Adult  Goal: Maintains or returns to baseline bowel function  Outcome: Progressing     Problem: Genitourinary - Adult  Goal: Absence of urinary retention  Outcome: Progressing     Problem: Infection - Adult  Goal: Absence of infection at discharge  Outcome: Progressing  Goal: Absence of infection during hospitalization  Outcome: Progressing     Problem: Metabolic/Fluid and Electrolytes - Adult  Goal: Electrolytes maintained within normal limits  Outcome: Progressing     Problem: Hematologic - Adult  Goal: Maintains hematologic stability  Outcome: Progressing     Problem: Chronic Conditions and Co-morbidities  Goal: Patient's chronic conditions and co-morbidity symptoms are monitored and maintained or improved  Outcome: Progressing     Problem: Nutrition Deficit:  Goal: Optimize nutritional status  Outcome: Progressing     Problem: Pain  Goal: Verbalizes/displays adequate comfort

## 2024-06-15 NOTE — PROGRESS NOTES
[facility]  INPATIENT PHYSICAL THERAPY  [facility] - 8K-13/013-A  Discharge Note    Time In: 0700  Time Out: 0730  Timed Code Treatment Minutes: 30 Minutes  Minutes: 30          Date: 6/15/2024  Patient Name: Deloris Watts,  Gender:  female        MRN: [id number]  : 1955  (68 y.o.)  Referral Date : 24  Referring Practitioner: Jax Whatley MD  Diagnosis: Subdural hematoma, acute (HCC)  Additional Pertinent Hx: Per H&P 24: Deloris Watts is a 68 y.o. right-handed  female with history of hypertension, osteoporosis, end-stage renal disease on hemodialysis (MWF) since 2023, stroke with left side weakness in , anemia, depression, anxiety, left hip hemiarthroplasty on 3/11/2024, and disability secondary to fall resulting acute left parafalcine subdural hematoma. The patient's  says he assisted the patient to sit on her rollator walker seat in living room and then pushing the rollator walker to bring the patient back to bedroom on 2024.  When they were near the bedroom door on the hallway, the patient suddenly slipped off the rollator walker seat and fell down to the floor with her head hitting the carpeted floor of the bedroom entrance.  She did not loss consciousness but felt some headache afterwards. CT of the head done on 2024 revealed small acute left parafalcine subdural hematoma.  Cervical spine CT on 2024 revealed no cervical spine injury.  Neurosurgery was consulted on 2024 for the acute subdural hematoma. A dose of tranexamic acid (TXA) was given.  No acute neurosurgical intervention was advised.  Neurosurgery note stated that Lovenox can be started on 2024 for DVT prophylaxis.  CT of head was repeated later on 2024 and showed stable CT scan of brain without interval change compared to previous study.     Prior Level of Function:  Lives With: Spouse  Type of Home: House  Home Layout: One  Guard Assistance, X 1, with increased time for completion, cues for hand placement  Stand to Sit:Stand By Assistance, Contact Guard Assistance, X 1, with increased time for completion, cues for hand placement  Completed multiple sit to stands from EOB recliner, wheelchair and couch surface in room for increased carryover of decreased posterior lean and hand placement. Pt demo good carryover at end of session with increased cues required in beginning.   Ambulation:  Stand By Assistance, Contact Guard Assistance, X 1, with verbal cues , with increased time for completion  Distance: 50'x1, 10'x3   Surface: Level Tile  Device: Rolling Walker  Gait Deviations:  Slow Anna, Decreased Step Length Bilaterally, Decreased Gait Speed, Decreased Heel Strike Bilaterally, Mild Path Deviations, and Decreased Terminal Knee Extension  *cues required for B step length and B heel strike with poor carryover. Required cues for staying inside RW at times   Stairs:  None    Balance:  Static Sitting Balance:  Stand By Assistance  Static Standing Balance: Stand By Assistance  Dynamic Standing Balance: Contact Guard Assistance    Neuromuscular Re-education  None    Exercise:  Patient was guided in 1 set(s) 10 reps of exercises: Ankle pumps, Glut sets, Quad sets, Heelslides, Short arc quads, Hip abduction/adduction and Straight leg raises,.  Exercises were completed for increased independence with functional mobility.    Functional Outcome Measures:   Not completed  Modified Carolyn Scale:  +3 - Moderate disability; requiring some help, but able to walk without physical assistance (SBA/CGA).   Patient could not live alone but could walk from one room to another without physical help from another person.  Education provided regarding stroke rehabilitation management.    ASSESSMENT:  Assessment: Patient progressing toward established goals.  Activity Tolerance:  Patient tolerance of  treatment: good.     Equipment Recommendations:Equipment

## 2024-06-17 ENCOUNTER — TELEPHONE (OUTPATIENT)
Dept: FAMILY MEDICINE CLINIC | Age: 69
End: 2024-06-17

## 2024-06-17 DIAGNOSIS — S06.5XAA SUBDURAL HEMATOMA (HCC): Primary | ICD-10-CM

## 2024-06-17 NOTE — TELEPHONE ENCOUNTER
----- Message from Johana Weldon MD sent at 6/17/2024  1:09 PM EDT -----  Regarding: MAURA call  Please do MAURA call.

## 2024-06-17 NOTE — PROGRESS NOTES
tablet 1    levETIRAcetam (KEPPRA) 500 MG tablet Take 1 tablet by mouth daily 90 tablet 1    traZODone (DESYREL) 100 MG tablet Take 1 tablet by mouth nightly For sleep 30 tablet 3    cetirizine (ZYRTEC) 5 MG tablet Take 1 tablet by mouth daily for 5 doses 5 tablet 0    carbidopa-levodopa (SINEMET)  MG per tablet Take 1 tablet by mouth 3 times daily At 7 am, 12 noon and 5 pm 90 tablet 3    amLODIPine (NORVASC) 10 MG tablet Take 1 tablet by mouth daily 30 tablet 3    docusate sodium (COLACE, DULCOLAX) 100 MG CAPS Take 100 mg by mouth 2 times daily ; hold if diarrhea 60 capsule 3    senna (SENOKOT) 8.6 MG tablet Take 1 tablet by mouth nightly ; hold if diarrhea 30 tablet 3    polyethylene glycol (GLYCOLAX) 17 g packet Take 1 packet by mouth daily as needed for Constipation 527 g 0    megestrol (MEGACE) 40 MG tablet Take 1 tablet by mouth daily To stimulate appetite 30 tablet 3    Probiotic Product (PROBIOTIC DAILY PO) Take 1 tablet by mouth daily 1 gummie daily      sodium chloride (OCEAN, BABY AYR) 0.65 % nasal spray 1 spray by Nasal route as needed for Congestion Saline nasal spray as needed      Handicap Placard MISC by Does not apply route Expires in 5 years.  Dx: 1 each 0    bisacodyl (DULCOLAX) 10 MG suppository Place 1 suppository rectally daily as needed for Constipation 30 suppository 3    fluticasone (FLONASE) 50 MCG/ACT nasal spray 2 sprays by Each Nostril route daily 16 g 3    sevelamer (RENVELA) 800 MG tablet Take 1 tablet by mouth with breakfast and with evening meal      linaclotide (LINZESS) 145 MCG capsule Take 2 capsules by mouth daily as needed (constipation) PRN      glucose monitoring (FREESTYLE FREEDOM) kit 1 kit by Does not apply route daily 1 kit 0    glucose monitoring (FREESTYLE FREEDOM) kit 1 kit by Does not apply route daily 1 kit 0    blood glucose monitor strips Test 1-2x/day. 100 strip 0    Lancets MISC 1 each by Does not apply route 2 times daily 100 each 0    ondansetron

## 2024-06-18 ENCOUNTER — HOSPITAL ENCOUNTER (OUTPATIENT)
Dept: CT IMAGING | Age: 69
Discharge: HOME | End: 2024-06-18
Payer: MEDICARE

## 2024-06-18 ENCOUNTER — COMMUNITY CARE MANAGEMENT (OUTPATIENT)
Facility: CLINIC | Age: 69
End: 2024-06-18

## 2024-06-18 DIAGNOSIS — S06.5XAA SUBDURAL HEMATOMA (HCC): ICD-10-CM

## 2024-06-18 PROCEDURE — 70450 CT HEAD/BRAIN W/O DYE: CPT

## 2024-06-19 ENCOUNTER — OFFICE VISIT (OUTPATIENT)
Dept: FAMILY MEDICINE CLINIC | Age: 69
End: 2024-06-19

## 2024-06-19 VITALS
RESPIRATION RATE: 16 BRPM | HEART RATE: 88 BPM | TEMPERATURE: 98.9 F | SYSTOLIC BLOOD PRESSURE: 118 MMHG | BODY MASS INDEX: 20.1 KG/M2 | HEIGHT: 60 IN | WEIGHT: 102.4 LBS | DIASTOLIC BLOOD PRESSURE: 74 MMHG

## 2024-06-19 DIAGNOSIS — Z09 HOSPITAL DISCHARGE FOLLOW-UP: Primary | ICD-10-CM

## 2024-06-19 DIAGNOSIS — S06.5XAA SUBDURAL HEMATOMA (HCC): ICD-10-CM

## 2024-06-19 DIAGNOSIS — I63.00 CEREBRAL INFARCTION DUE TO THROMBOSIS OF PRECEREBRAL ARTERY (HCC): Chronic | ICD-10-CM

## 2024-06-19 DIAGNOSIS — F41.9 ANXIETY AND DEPRESSION: ICD-10-CM

## 2024-06-19 DIAGNOSIS — F32.A ANXIETY AND DEPRESSION: ICD-10-CM

## 2024-06-19 DIAGNOSIS — N18.6 ESRD (END STAGE RENAL DISEASE) (HCC): ICD-10-CM

## 2024-06-19 DIAGNOSIS — G25.0 ESSENTIAL TREMOR: ICD-10-CM

## 2024-06-19 RX ORDER — AMLODIPINE BESYLATE 5 MG/1
5 TABLET ORAL DAILY
COMMUNITY
End: 2024-06-19

## 2024-06-19 RX ORDER — CLOPIDOGREL BISULFATE 75 MG/1
75 TABLET ORAL DAILY
Qty: 90 TABLET | Refills: 1 | Status: SHIPPED | OUTPATIENT
Start: 2024-06-19

## 2024-06-19 RX ORDER — TRAZODONE HYDROCHLORIDE 50 MG/1
50 TABLET ORAL NIGHTLY
COMMUNITY

## 2024-06-19 RX ORDER — BUPROPION HYDROCHLORIDE 300 MG/1
300 TABLET ORAL DAILY
Qty: 90 TABLET | Refills: 1 | Status: SHIPPED | OUTPATIENT
Start: 2024-06-19

## 2024-06-19 RX ORDER — PRIMIDONE 50 MG/1
50 TABLET ORAL DAILY
Qty: 90 TABLET | Refills: 1 | Status: SHIPPED | OUTPATIENT
Start: 2024-06-19

## 2024-06-19 RX ORDER — BUPROPION HYDROCHLORIDE 150 MG/1
150 TABLET ORAL EVERY MORNING
Qty: 90 TABLET | Refills: 1 | Status: SHIPPED | OUTPATIENT
Start: 2024-06-19

## 2024-06-25 ENCOUNTER — COMMUNITY CARE MANAGEMENT (OUTPATIENT)
Facility: CLINIC | Age: 69
End: 2024-06-25

## 2024-06-25 NOTE — PROGRESS NOTES
TCM will not be initiated by [facility] TCM as this has been completed in EPIC noted 6/19/2024 by PCP. RNCM will close TCM case and monitor.

## 2024-06-26 ENCOUNTER — OFFICE VISIT (OUTPATIENT)
Dept: NEUROSURGERY | Age: 69
End: 2024-06-26
Payer: MEDICARE

## 2024-06-26 VITALS
BODY MASS INDEX: 20.42 KG/M2 | SYSTOLIC BLOOD PRESSURE: 117 MMHG | WEIGHT: 104 LBS | HEIGHT: 60 IN | HEART RATE: 93 BPM | DIASTOLIC BLOOD PRESSURE: 68 MMHG

## 2024-06-26 DIAGNOSIS — Z98.890 S/P KYPHOPLASTY: Primary | ICD-10-CM

## 2024-06-26 DIAGNOSIS — S06.5XAA SUBDURAL HEMATOMA (HCC): ICD-10-CM

## 2024-06-26 DIAGNOSIS — S32.020D COMPRESSION FRACTURE OF L2 VERTEBRA WITH ROUTINE HEALING, SUBSEQUENT ENCOUNTER: ICD-10-CM

## 2024-06-26 PROCEDURE — G8420 CALC BMI NORM PARAMETERS: HCPCS | Performed by: PHYSICIAN ASSISTANT

## 2024-06-26 PROCEDURE — 1111F DSCHRG MED/CURRENT MED MERGE: CPT | Performed by: PHYSICIAN ASSISTANT

## 2024-06-26 PROCEDURE — G8427 DOCREV CUR MEDS BY ELIG CLIN: HCPCS | Performed by: PHYSICIAN ASSISTANT

## 2024-06-26 PROCEDURE — 1036F TOBACCO NON-USER: CPT | Performed by: PHYSICIAN ASSISTANT

## 2024-06-26 PROCEDURE — G8399 PT W/DXA RESULTS DOCUMENT: HCPCS | Performed by: PHYSICIAN ASSISTANT

## 2024-06-26 PROCEDURE — 3017F COLORECTAL CA SCREEN DOC REV: CPT | Performed by: PHYSICIAN ASSISTANT

## 2024-06-26 PROCEDURE — 99214 OFFICE O/P EST MOD 30 MIN: CPT | Performed by: PHYSICIAN ASSISTANT

## 2024-06-26 PROCEDURE — 3078F DIAST BP <80 MM HG: CPT | Performed by: PHYSICIAN ASSISTANT

## 2024-06-26 PROCEDURE — 1090F PRES/ABSN URINE INCON ASSESS: CPT | Performed by: PHYSICIAN ASSISTANT

## 2024-06-26 PROCEDURE — 3074F SYST BP LT 130 MM HG: CPT | Performed by: PHYSICIAN ASSISTANT

## 2024-06-26 PROCEDURE — 1123F ACP DISCUSS/DSCN MKR DOCD: CPT | Performed by: PHYSICIAN ASSISTANT

## 2024-06-26 NOTE — PROGRESS NOTES
[facility]YS[facility] [place] [facility]  [facility] NEUROSURGERY  [address]  [facility] [address]  Dept: [phone]  Dept Fax: [phone]  Loc: [phone]    Follow-up visit  Visit Date: 6/26/2024      Deloris  is a 68 y.o. female who is returning to the office today for a  follow-up visit for continuing evaluation of subdural hematoma evidenced on imaging during recent hospitalization.  She was most recently seen and evaluated while an inpatient at [facility] by Dr. Garza and myself in consultation on 5/26/2024 with CT scan imaging revealing small acute left parafalcine subdural hematoma.  Today's follow-up is to include a new CT scan as a comparison image to ascertain progress towards resolution.  She presents today having undergone a CT scan of the head without contrast on 6/18/2024 which shows the left parafalcine subdural hemorrhage resolved with no new hemorrhages or extra-axial collections.  Based on the resolution reflected on recent imaging and the stable intact nature of today's exam we have agreed to follow her as needed with encouragement for her to keep and maintain appointments with other subspecialties and reach out to our service with any additional questions or concerns.     Patient was evaluated today and is doing well overall.  No new complaints were voiced.  Patient  lives with their family  Wound: none  Follow-up Studies: No orders of the defined types were placed in this encounter.       Assessment/Plan:  Status Post left parafalcine subdural hematoma follow-up  Doing well overall  Encouraged gradual increase in physical and mental activity.  Fall precaution and home safety education provided to patient.  Follow-up: With recent imaging showing complete resolution of the prior hemorrhage and with the stable improved nature of today's exam, we have agreed to follow her as needed with encouragement for her to reach out to our service with any additional questions

## 2024-07-02 ENCOUNTER — TELEPHONE (OUTPATIENT)
Dept: FAMILY MEDICINE CLINIC | Age: 69
End: 2024-07-02

## 2024-07-02 ENCOUNTER — TELEPHONE (OUTPATIENT)
Dept: PSYCHOLOGY | Age: 69
End: 2024-07-02

## 2024-07-02 NOTE — TELEPHONE ENCOUNTER
[facility] calling.  Pt saw lucita thompson, she recommends for her to take lithium 5mg daily.    Nurse was concerned about her taking this with asa 81 because she did some research and it was not recommended to take with Nsaids.   She called sarath office and they advised her to call PCP.     Sabra Gaytan RN with [facility]  call back phone .

## 2024-07-02 NOTE — TELEPHONE ENCOUNTER
Sabra ( nurse) contacted the office regarding Lithium Orotate supplement that was recommended by provider and mentioned possible medication interaction with aspirin. Staff transferred call to provider to further discuss.

## 2024-07-03 ENCOUNTER — TELEPHONE (OUTPATIENT)
Dept: PSYCHOLOGY | Age: 69
End: 2024-07-03

## 2024-07-03 NOTE — TELEPHONE ENCOUNTER
[facility] nurse called with question about trace lithium for neuroprotection. I referred her to the patient's physician and offered journal articles on the topic if the physician wished.

## 2024-07-08 ENCOUNTER — APPOINTMENT (OUTPATIENT)
Dept: MRI IMAGING | Age: 69
End: 2024-07-08
Payer: MEDICARE

## 2024-07-08 ENCOUNTER — APPOINTMENT (OUTPATIENT)
Dept: CT IMAGING | Age: 69
End: 2024-07-08
Payer: MEDICARE

## 2024-07-08 ENCOUNTER — APPOINTMENT (OUTPATIENT)
Dept: GENERAL RADIOLOGY | Age: 69
End: 2024-07-08
Payer: MEDICARE

## 2024-07-08 ENCOUNTER — HOSPITAL ENCOUNTER (INPATIENT)
Age: 69
LOS: 3 days | Discharge: HOME | End: 2024-07-11
Attending: EMERGENCY MEDICINE
Payer: MEDICARE

## 2024-07-08 DIAGNOSIS — R41.82 ALTERED MENTAL STATUS, UNSPECIFIED ALTERED MENTAL STATUS TYPE: Primary | ICD-10-CM

## 2024-07-08 DIAGNOSIS — J30.1 ALLERGIC RHINITIS DUE TO POLLEN, UNSPECIFIED SEASONALITY: ICD-10-CM

## 2024-07-08 DIAGNOSIS — R51.9 ACUTE NONINTRACTABLE HEADACHE, UNSPECIFIED HEADACHE TYPE: ICD-10-CM

## 2024-07-08 PROBLEM — G93.40 ENCEPHALOPATHY: Status: ACTIVE | Noted: 2024-07-08

## 2024-07-08 LAB
ALBUMIN SERPL BCG-MCNC: 4.7 G/DL (ref 3.5–5.1)
ALP SERPL-CCNC: 138 U/L (ref 38–126)
ALT SERPL W/O P-5'-P-CCNC: < 5 U/L (ref 11–66)
ANION GAP SERPL CALC-SCNC: 22 MEQ/L (ref 8–16)
AST SERPL-CCNC: 17 U/L (ref 5–40)
BASOPHILS ABSOLUTE: 0.1 THOU/MM3 (ref 0–0.1)
BASOPHILS NFR BLD AUTO: 0.7 %
BILIRUB SERPL-MCNC: 0.5 MG/DL (ref 0.3–1.2)
BUN SERPL-MCNC: 61 MG/DL (ref 7–22)
CALCIUM SERPL-MCNC: 9.8 MG/DL (ref 8.5–10.5)
CHLORIDE SERPL-SCNC: 92 MEQ/L (ref 98–111)
CO2 SERPL-SCNC: 23 MEQ/L (ref 23–33)
CREAT SERPL-MCNC: 6.4 MG/DL (ref 0.4–1.2)
DEPRECATED RDW RBC AUTO: 68.8 FL (ref 35–45)
EKG ATRIAL RATE: 84 BPM
EKG P AXIS: 77 DEGREES
EKG P-R INTERVAL: 134 MS
EKG Q-T INTERVAL: 376 MS
EKG QRS DURATION: 114 MS
EKG QTC CALCULATION (BAZETT): 444 MS
EKG R AXIS: 96 DEGREES
EKG T AXIS: 67 DEGREES
EKG VENTRICULAR RATE: 84 BPM
EOSINOPHIL NFR BLD AUTO: 0.3 %
EOSINOPHILS ABSOLUTE: 0 THOU/MM3 (ref 0–0.4)
ERYTHROCYTE [DISTWIDTH] IN BLOOD BY AUTOMATED COUNT: 18.1 % (ref 11.5–14.5)
GFR SERPL CREATININE-BSD FRML MDRD: 7 ML/MIN/1.73M2
GLUCOSE SERPL-MCNC: 86 MG/DL (ref 70–108)
HCT VFR BLD AUTO: 50.3 % (ref 37–47)
HGB BLD-MCNC: 15.5 GM/DL (ref 12–16)
IMM GRANULOCYTES # BLD AUTO: 0.05 THOU/MM3 (ref 0–0.07)
IMM GRANULOCYTES NFR BLD AUTO: 0.6 %
LYMPHOCYTES ABSOLUTE: 0.6 THOU/MM3 (ref 1–4.8)
LYMPHOCYTES NFR BLD AUTO: 6.2 %
MCH RBC QN AUTO: 31.8 PG (ref 26–33)
MCHC RBC AUTO-ENTMCNC: 30.8 GM/DL (ref 32.2–35.5)
MCV RBC AUTO: 103.3 FL (ref 81–99)
MONOCYTES ABSOLUTE: 0.5 THOU/MM3 (ref 0.4–1.3)
MONOCYTES NFR BLD AUTO: 5.1 %
NEUTROPHILS ABSOLUTE: 7.9 THOU/MM3 (ref 1.8–7.7)
NEUTROPHILS NFR BLD AUTO: 87.1 %
NRBC BLD AUTO-RTO: 0 /100 WBC
OSMOLALITY SERPL CALC.SUM OF ELEC: 290.4 MOSMOL/KG (ref 275–300)
PLATELET # BLD AUTO: 328 THOU/MM3 (ref 130–400)
PMV BLD AUTO: 10 FL (ref 9.4–12.4)
POTASSIUM SERPL-SCNC: 5.4 MEQ/L (ref 3.5–5.2)
PROT SERPL-MCNC: 8.2 G/DL (ref 6.1–8)
RBC # BLD AUTO: 4.87 MILL/MM3 (ref 4.2–5.4)
SODIUM SERPL-SCNC: 137 MEQ/L (ref 135–145)
WBC # BLD AUTO: 9.1 THOU/MM3 (ref 4.8–10.8)

## 2024-07-08 PROCEDURE — 70450 CT HEAD/BRAIN W/O DYE: CPT

## 2024-07-08 PROCEDURE — 93010 ELECTROCARDIOGRAM REPORT: CPT | Performed by: INTERNAL MEDICINE

## 2024-07-08 PROCEDURE — 2580000003 HC RX 258

## 2024-07-08 PROCEDURE — 6360000002 HC RX W HCPCS: Performed by: EMERGENCY MEDICINE

## 2024-07-08 PROCEDURE — 70498 CT ANGIOGRAPHY NECK: CPT

## 2024-07-08 PROCEDURE — 71045 X-RAY EXAM CHEST 1 VIEW: CPT

## 2024-07-08 PROCEDURE — 96374 THER/PROPH/DIAG INJ IV PUSH: CPT

## 2024-07-08 PROCEDURE — 6370000000 HC RX 637 (ALT 250 FOR IP): Performed by: EMERGENCY MEDICINE

## 2024-07-08 PROCEDURE — 1200000003 HC TELEMETRY R&B

## 2024-07-08 PROCEDURE — 5A1D70Z PERFORMANCE OF URINARY FILTRATION, INTERMITTENT, LESS THAN 6 HOURS PER DAY: ICD-10-PCS | Performed by: INTERNAL MEDICINE

## 2024-07-08 PROCEDURE — 93005 ELECTROCARDIOGRAM TRACING: CPT | Performed by: EMERGENCY MEDICINE

## 2024-07-08 PROCEDURE — 90935 HEMODIALYSIS ONE EVALUATION: CPT

## 2024-07-08 PROCEDURE — 99222 1ST HOSP IP/OBS MODERATE 55: CPT | Performed by: INTERNAL MEDICINE

## 2024-07-08 PROCEDURE — 90935 HEMODIALYSIS ONE EVALUATION: CPT | Performed by: INTERNAL MEDICINE

## 2024-07-08 PROCEDURE — 6360000004 HC RX CONTRAST MEDICATION: Performed by: EMERGENCY MEDICINE

## 2024-07-08 PROCEDURE — 36415 COLL VENOUS BLD VENIPUNCTURE: CPT

## 2024-07-08 PROCEDURE — 85025 COMPLETE CBC W/AUTO DIFF WBC: CPT

## 2024-07-08 PROCEDURE — 80053 COMPREHEN METABOLIC PANEL: CPT

## 2024-07-08 PROCEDURE — 99285 EMERGENCY DEPT VISIT HI MDM: CPT

## 2024-07-08 PROCEDURE — 6370000000 HC RX 637 (ALT 250 FOR IP)

## 2024-07-08 PROCEDURE — 2580000003 HC RX 258: Performed by: EMERGENCY MEDICINE

## 2024-07-08 PROCEDURE — 70496 CT ANGIOGRAPHY HEAD: CPT

## 2024-07-08 PROCEDURE — 70551 MRI BRAIN STEM W/O DYE: CPT

## 2024-07-08 RX ORDER — ACETAMINOPHEN 325 MG/1
650 TABLET ORAL EVERY 6 HOURS PRN
Status: DISCONTINUED | OUTPATIENT
Start: 2024-07-08 | End: 2024-07-11 | Stop reason: HOSPADM

## 2024-07-08 RX ORDER — MEGESTROL ACETATE 40 MG/1
40 TABLET ORAL DAILY
Status: DISCONTINUED | OUTPATIENT
Start: 2024-07-08 | End: 2024-07-11 | Stop reason: HOSPADM

## 2024-07-08 RX ORDER — TRAZODONE HYDROCHLORIDE 50 MG/1
50 TABLET ORAL NIGHTLY PRN
Status: DISCONTINUED | OUTPATIENT
Start: 2024-07-08 | End: 2024-07-11 | Stop reason: HOSPADM

## 2024-07-08 RX ORDER — SODIUM CHLORIDE 9 MG/ML
INJECTION, SOLUTION INTRAVENOUS PRN
Status: DISCONTINUED | OUTPATIENT
Start: 2024-07-08 | End: 2024-07-11 | Stop reason: HOSPADM

## 2024-07-08 RX ORDER — AMLODIPINE BESYLATE 10 MG/1
10 TABLET ORAL DAILY
Status: DISCONTINUED | OUTPATIENT
Start: 2024-07-08 | End: 2024-07-11 | Stop reason: HOSPADM

## 2024-07-08 RX ORDER — PRIMIDONE 50 MG/1
50 TABLET ORAL DAILY
Status: DISCONTINUED | OUTPATIENT
Start: 2024-07-08 | End: 2024-07-11 | Stop reason: HOSPADM

## 2024-07-08 RX ORDER — FOLIC ACID 1 MG/1
500 TABLET ORAL DAILY
Status: DISCONTINUED | OUTPATIENT
Start: 2024-07-08 | End: 2024-07-11 | Stop reason: HOSPADM

## 2024-07-08 RX ORDER — SODIUM CHLORIDE 0.9 % (FLUSH) 0.9 %
5-40 SYRINGE (ML) INJECTION EVERY 12 HOURS SCHEDULED
Status: DISCONTINUED | OUTPATIENT
Start: 2024-07-08 | End: 2024-07-11 | Stop reason: HOSPADM

## 2024-07-08 RX ORDER — FENTANYL CITRATE 50 UG/ML
25 INJECTION, SOLUTION INTRAMUSCULAR; INTRAVENOUS ONCE
Status: COMPLETED | OUTPATIENT
Start: 2024-07-08 | End: 2024-07-08

## 2024-07-08 RX ORDER — ONDANSETRON 2 MG/ML
4 INJECTION INTRAMUSCULAR; INTRAVENOUS EVERY 6 HOURS PRN
Status: DISCONTINUED | OUTPATIENT
Start: 2024-07-08 | End: 2024-07-11 | Stop reason: HOSPADM

## 2024-07-08 RX ORDER — ONDANSETRON 4 MG/1
4 TABLET, ORALLY DISINTEGRATING ORAL EVERY 8 HOURS PRN
Status: DISCONTINUED | OUTPATIENT
Start: 2024-07-08 | End: 2024-07-11 | Stop reason: HOSPADM

## 2024-07-08 RX ORDER — POLYETHYLENE GLYCOL 3350 17 G/17G
17 POWDER, FOR SOLUTION ORAL DAILY PRN
Status: DISCONTINUED | OUTPATIENT
Start: 2024-07-08 | End: 2024-07-11 | Stop reason: HOSPADM

## 2024-07-08 RX ORDER — BUPROPION HYDROCHLORIDE 150 MG/1
150 TABLET ORAL EVERY MORNING
Status: DISCONTINUED | OUTPATIENT
Start: 2024-07-09 | End: 2024-07-11 | Stop reason: HOSPADM

## 2024-07-08 RX ORDER — SODIUM CHLORIDE 0.9 % (FLUSH) 0.9 %
5-40 SYRINGE (ML) INJECTION PRN
Status: DISCONTINUED | OUTPATIENT
Start: 2024-07-08 | End: 2024-07-11 | Stop reason: HOSPADM

## 2024-07-08 RX ORDER — ACETAMINOPHEN 500 MG
1000 TABLET ORAL ONCE
Status: COMPLETED | OUTPATIENT
Start: 2024-07-08 | End: 2024-07-08

## 2024-07-08 RX ORDER — ACETAMINOPHEN 650 MG/1
650 SUPPOSITORY RECTAL EVERY 6 HOURS PRN
Status: DISCONTINUED | OUTPATIENT
Start: 2024-07-08 | End: 2024-07-11 | Stop reason: HOSPADM

## 2024-07-08 RX ORDER — BUPROPION HYDROCHLORIDE 300 MG/1
300 TABLET ORAL DAILY
Status: DISCONTINUED | OUTPATIENT
Start: 2024-07-08 | End: 2024-07-11 | Stop reason: HOSPADM

## 2024-07-08 RX ORDER — 0.9 % SODIUM CHLORIDE 0.9 %
125 INTRAVENOUS SOLUTION INTRAVENOUS ONCE
Status: COMPLETED | OUTPATIENT
Start: 2024-07-08 | End: 2024-07-08

## 2024-07-08 RX ORDER — SEVELAMER CARBONATE 800 MG/1
800 TABLET, FILM COATED ORAL 2 TIMES DAILY WITH MEALS
Status: DISCONTINUED | OUTPATIENT
Start: 2024-07-08 | End: 2024-07-11 | Stop reason: HOSPADM

## 2024-07-08 RX ORDER — LEVETIRACETAM 500 MG/1
500 TABLET ORAL DAILY
Status: DISCONTINUED | OUTPATIENT
Start: 2024-07-08 | End: 2024-07-11 | Stop reason: HOSPADM

## 2024-07-08 RX ORDER — CLOPIDOGREL BISULFATE 75 MG/1
75 TABLET ORAL DAILY
Status: DISCONTINUED | OUTPATIENT
Start: 2024-07-08 | End: 2024-07-11 | Stop reason: HOSPADM

## 2024-07-08 RX ADMIN — CARBIDOPA AND LEVODOPA 1 TABLET: 10; 100 TABLET ORAL at 23:45

## 2024-07-08 RX ADMIN — IOPAMIDOL 80 ML: 755 INJECTION, SOLUTION INTRAVENOUS at 10:55

## 2024-07-08 RX ADMIN — FENTANYL CITRATE 25 MCG: 50 INJECTION, SOLUTION INTRAMUSCULAR; INTRAVENOUS at 12:51

## 2024-07-08 RX ADMIN — LEVETIRACETAM 500 MG: 500 TABLET, FILM COATED ORAL at 23:45

## 2024-07-08 RX ADMIN — ACETAMINOPHEN 1000 MG: 500 TABLET ORAL at 12:51

## 2024-07-08 RX ADMIN — BUPROPION HYDROCHLORIDE 300 MG: 300 TABLET, EXTENDED RELEASE ORAL at 23:45

## 2024-07-08 RX ADMIN — SEVELAMER CARBONATE 800 MG: 800 TABLET, FILM COATED ORAL at 23:45

## 2024-07-08 RX ADMIN — PRIMIDONE 50 MG: 50 TABLET ORAL at 23:45

## 2024-07-08 RX ADMIN — SODIUM CHLORIDE, PRESERVATIVE FREE 10 ML: 5 INJECTION INTRAVENOUS at 23:45

## 2024-07-08 RX ADMIN — AMLODIPINE BESYLATE 10 MG: 10 TABLET ORAL at 23:45

## 2024-07-08 RX ADMIN — SERTRALINE HYDROCHLORIDE 50 MG: 50 TABLET ORAL at 23:45

## 2024-07-08 RX ADMIN — MEGESTROL ACETATE 40 MG: 40 TABLET ORAL at 23:45

## 2024-07-08 RX ADMIN — SODIUM CHLORIDE 125 ML: 9 INJECTION, SOLUTION INTRAVENOUS at 14:21

## 2024-07-08 RX ADMIN — CLOPIDOGREL BISULFATE 75 MG: 75 TABLET ORAL at 23:45

## 2024-07-08 RX ADMIN — FOLIC ACID 500 MCG: 1 TABLET ORAL at 23:45

## 2024-07-08 ASSESSMENT — ENCOUNTER SYMPTOMS
EYE PAIN: 0
VOMITING: 0
SORE THROAT: 0
ABDOMINAL PAIN: 0
SHORTNESS OF BREATH: 0
BACK PAIN: 0
EYES NEGATIVE: 1
DIARRHEA: 0
COUGH: 0
NAUSEA: 0

## 2024-07-08 ASSESSMENT — PAIN SCALES - GENERAL
PAINLEVEL_OUTOF10: 10
PAINLEVEL_OUTOF10: 10

## 2024-07-08 ASSESSMENT — PAIN DESCRIPTION - PAIN TYPE: TYPE: ACUTE PAIN

## 2024-07-08 ASSESSMENT — PAIN - FUNCTIONAL ASSESSMENT: PAIN_FUNCTIONAL_ASSESSMENT: 0-10

## 2024-07-08 ASSESSMENT — PAIN DESCRIPTION - FREQUENCY: FREQUENCY: CONTINUOUS

## 2024-07-08 ASSESSMENT — PAIN DESCRIPTION - LOCATION: LOCATION: HEAD

## 2024-07-08 NOTE — FLOWSHEET NOTE
07/08/24 1740   Vital Signs   BP (!) 141/68   Temp 98.6 °F (37 °C)   Pulse 98   Respirations 13   SpO2 98 %   Weight - Scale 47.6 kg (104 lb 15 oz)   Weight Method Bed scale   Percent Weight Change -4.03   Pain Assessment   Pain Assessment None - Denies Pain   Post-Hemodialysis Assessment   Post-Treatment Procedures Blood returned;Catheter Capped, clamped with Saline x2 ports   Machine Disinfection Process Acid/Vinegar Clean;Heat Disinfect;Exterior Machine Disinfection   Rinseback Volume (ml) 400 ml   Blood Volume Processed (Liters) 56.5 L   Dialyzer Clearance Lightly streaked   Duration of Treatment (minutes) 150 minutes   Hemodialysis Output (ml) 2000 ml   Tolerated Treatment Good     Stable 2.5 hour TX completed. Removed 2 L of fluid. pt tolerated fluid removal well. Bilateral cath ports flushed with normal saline, clamped, and secured with tegos. CVC drsg clean, dry, and intact. Report called to primary RN. TX record printed for scanning into EMR.

## 2024-07-08 NOTE — H&P
History & Physical  Internal Medicine Resident         Patient: Deloris Watts 68 y.o. female      : 1955  Date of Admission: 2024  Date of Service: Pt seen/examined on 24 and Admitted to Inpatient with expected LOS greater than two midnights due to medical therapy.       ASSESSMENT AND PLAN  Headache  Reports headache has been ongoing for the past 2 days which is worsening in other headache she has had in the past.  CT head, CTA head and neck show no significant findings.  On examination, patient reports improvement after medications given in the ED.  Patient given Tylenol 1000 mg and fentanyl 25 mcg.  Prior history of subdural bleed, as below.  -MRI brain ordered  -If patient continues to complain of headache, recommend considering neurology consult    Encephalopathy?  Oriented to self and location, not oriented to time.  Per ED report, patient's  stated the patient was not at her normal baseline mentation, reported concern of significant worsening confusion and some visual type hallucinations.  However, patient was evaluated by Dr. Cuadra, her nephrologist, who states that she is at her baseline mental status.  Unable to speak to  about his concerns regarding patient.  No leukocytosis.  Mildly uremic, however this was prior to patient's scheduled HD.  No significant findings on CT head, CT head and neck.  -UA ordered to evaluate for potential metabolic cause, patient continues to make some urine  -MRI brain ordered, as above    ESRD on HD  Follows with Dr. Cuadra.  [facility] schedule.  Previously scheduled for HD at 3 PM for admission.  -Continue Renvela  -Nephrology following for HD    Hypertension  -Continue home amlodipine 10 mg with hold parameters    Parkinson's disease  -Continue home Sinemet  -Continue home primidone    Anxiety/Depression  -Continue home Zoloft, continue home Wellbutrin    Hx of small left parafalcine subdural hematoma  Occurred on 2024, due to

## 2024-07-08 NOTE — ED PROVIDER NOTES
[facility] EMERGENCY DEPT      EMERGENCY MEDICINE     Pt Name: Deloris Watts  MRN: [id number]  Birthdate 1955  Date of evaluation: 7/8/2024  Provider: ANAY LUNA DO, FACEP    CHIEF COMPLAINT       Chief Complaint   Patient presents with    Headache     HISTORY OF PRESENT ILLNESS   Deloris Watts is a pleasant 68 y.o. female who presents to the emergency department for evaluation of headache.  Pt states headache started a week ago and has been daily and persistent. Her  states it started 2 days ago. She has some baseline underlying confusion as a result of a previous stroke;  states it is significantly worse past several days.  No trauma or injury.  No fevers, chills, or illnesses.  She is Monday Wednesday Friday ESRD on hemodialysis, last dialyzed on Friday and is due for dialysis today at 3 PM.    Patient is unable to definitively answer questions about paresthesias, weakness, visual changes, or any other review of system    PASTMEDICAL HISTORY/Co-Morbid Conditions:     Past Medical History:   Diagnosis Date    Allergic rhinitis     Anemia in CKD (chronic kidney disease)     Anxiety     CHF (congestive heart failure) (HCC)     Closed fracture of right proximal humerus 09/18/2021    ORIF    Closed right ankle fracture     In 1990s; treated with casting    CVA (cerebral infarction)     in 1990s ; with left-sided weakness    Depression     Fibromyalgia     in 1990s    Headache(784.0)     Hemiplegia and hemiparesis following cerebral infarction affecting left non-dominant side (HCC)     in 1990s    Hydronephrosis 09/01/2023    Urogenital Implants, calculus of ureter, UTI    Hyperlipidemia     Hypertension     in 1980s    Irritable bowel syndrome     in 1990s    Kidney failure     Chronic Kidney Disease, Renal Dialysis started in 1/2023    Osteoporosis 2019    Unspecified cerebral artery occlusion with cerebral infarction     Unspecified sleep apnea     Wrist fracture, right 2018     ischemic changes, normal axis  Imaging: CT brain, CTA head and neck, CT cervical spine, chest x-ray  Labs:      CBC, CMP, troponin      Consultations: discussed with Dr. Cuadra, nehprology;  Hospitalist who I requested to admit the patient    MDM and ED Course:  Pt feels better after Tylenol and dose of Fentanyl. However she appears more restless.  states this is not baseline for her.   Unable to get urine to r/o UTI, which she has a hx of.  Will admit to hospitalist for further diagnostic testing.  May need MRI    Pt was reassessed and the results of pertinent diagnostic studies and exam findings were discussed. The patient’s provisional diagnosis and plan of care were discussed with the patient and present family utilizing shared decision-making. Any medications were reviewed and indications and risks of medications were discussed with the patient /family present.            ED Medications administered this visit:  (None if blank)  Medications - No data to display      PROCEDURES: (None if blank)  Procedures:     CRITICAL CARE: (None if blank)      DISCHARGE PRESCRIPTIONS: (None if blank)  New Prescriptions    No medications on file       FINAL IMPRESSION      1. Altered mental status, unspecified altered mental status type    2. Acute nonintractable headache, unspecified headache type          DISPOSITION/PLAN   DISPOSITION        OUTPATIENT FOLLOW UP THE PATIENT:  No follow-up provider specified.    DO Alessia LUQUE, Michael DUMAS DO  07/09/24 9287

## 2024-07-08 NOTE — CONSULTS
[facility]          [address]        [address]        [place], [place] [address] [phone]           Inpatient Initial consult note         7/8/2024 3:33 PM      Patient Name:   Deloris Watts  YOB: 1955  Primary Care Physician:  Johana Weldon MD   Admit Date:    7/8/2024  8:33 AM    Consultation requested by : Diana Samuels MD    Reason for Consult : Nephrology following for ESRD on hemodialysis.    History of presenting illness :Deloris Watts is a 68 y.o.   female with Past Medical History as stated below presented with a chief complaint of Headache   on 7/8/2024 .    Patient apparently was brought in for persistent headache and some change in mental status as well which has been ongoing for the last 3 days it was felt like she is more confused than her baseline.    During my exam today patient is awake and alert no distress denies any complaints at this time.  Mental status for me appears to be at baseline she knows where she is she knows who I am and who her nephrologist is.    Past History:  Past Medical History:   Diagnosis Date    Allergic rhinitis     Anemia in CKD (chronic kidney disease)     Anxiety     CHF (congestive heart failure) (HCC)     Closed fracture of right proximal humerus 09/18/2021    ORIF    Closed right ankle fracture     In 1990s; treated with casting    CVA (cerebral infarction)     in 1990s ; with left-sided weakness    Depression     Fibromyalgia     in 1990s    Headache(784.0)     Hemiplegia and hemiparesis following cerebral infarction affecting left non-dominant side (HCC)     in 1990s    Hydronephrosis 09/01/2023    Urogenital Implants, calculus of ureter, UTI    Hyperlipidemia     Hypertension     in 1980s    Irritable bowel syndrome     in 1990s    Kidney failure     Chronic Kidney Disease, Renal Dialysis started in 1/2023    Osteoporosis 2019    Unspecified cerebral artery occlusion with cerebral infarction

## 2024-07-08 NOTE — ED NOTES
Patient presents with c/o migraine that has been present for the past 3 days. Pt states she has attempted tylenol with mild therapeutic relief.

## 2024-07-08 NOTE — ED NOTES
ED to inpatient nurses report      Chief Complaint:  Chief Complaint   Patient presents with    Headache     Present to ED from: home    MOA:     LOC: alert and orientated to name and place  Mobility: Requires assistance * 2  Oxygen Baseline: RA    Current needs required: RA     Code Status:   Prior    What abnormal results were found and what did you give/do to treat them? None  Any procedures or intervention occur? None    Mental Status:  Level of Consciousness: Alert (0)    Psych Assessment:        Vitals:  Patient Vitals for the past 24 hrs:   BP Temp Temp src Pulse Resp SpO2 Height Weight   07/08/24 1309 (!) 140/81 -- -- 88 10 96 % -- --   07/08/24 1032 (!) 155/77 -- -- 91 24 95 % -- --   07/08/24 0835 (!) 161/83 98.1 °F (36.7 °C) Oral 85 18 96 % 1.524 m (5') 45.8 kg (101 lb)        LDAs:   Peripheral IV 07/08/24 Distal;Right Forearm (Active)       Ambulatory Status:  No data recorded    Diagnosis:  DISPOSITION Decision To Admit 07/08/2024 02:12:46 PM   Final diagnoses:   Altered mental status, unspecified altered mental status type   Acute nonintractable headache, unspecified headache type        Consults:  None     Pain Score:  Pain Assessment  Pain Assessment: 0-10  Pain Level: 10  Pain Location: Head  Pain Type: Acute pain  Pain Frequency: Continuous    C-SSRS:   Risk of Suicide: No Risk    Sepsis Screening:       [place] Fall Risk:       Swallow Screening        Preferred Language:   English      ALLERGIES     Pioglitazone, Amoxicillin, Amoxicillin-pot clavulanate, Other, Ciprofloxacin, and Sulfa antibiotics    SURGICAL HISTORY       Past Surgical History:   Procedure Laterality Date    CARPAL TUNNEL RELEASE Right     in 1990s    COLONOSCOPY  2012    [facility]    CT BIOPSY RENAL  12/28/2022    CT BIOPSY RENAL 12/28/2022 [facility] [place] CT SCAN    CYSTOSCOPY Left 08/29/2023    CYSTOSCOPY, LEFT URETERAL STENT PLACEMENT performed by Edilson Whalen MD at [facility] [place] OR    ENDOSCOPY, COLON, DIAGNOSTIC  unsure    HEMORRHOID SURGERY   unsure    HIP SURGERY Left 03/11/2024    Left hemiarthroplasty performed by Virgilio Lezama DO at [facility] OR    HUMERUS FRACTURE SURGERY Right 2021    ORIF    HYSTERECTOMY (CERVIX STATUS UNKNOWN)      age 38    LASIK      lasik    NECK SURGERY  18  years ago    cervical spine fusion ; in 1990s    OVARY REMOVAL Bilateral     age 38    SINUS SURGERY  10 years ago    SPINE SURGERY N/A 12/18/2023    KYPHOPLASTY L2 performed by Nimisha Garza MD at [facility] OR    TUBAL LIGATION      URETER SURGERY N/A 09/20/2023    CYSTOSCOPY, LEFT  URETEROSCOPY, LASER LITHOTRIPSIE OF STONES performed by Edilson Whalen MD at [facility] OR       PAST MEDICAL HISTORY       Past Medical History:   Diagnosis Date    Allergic rhinitis     Anemia in CKD (chronic kidney disease)     Anxiety     CHF (congestive heart failure) (HCC)     Closed fracture of right proximal humerus 09/18/2021    ORIF    Closed right ankle fracture     In 1990s; treated with casting    CVA (cerebral infarction)     in 1990s ; with left-sided weakness    Depression     Fibromyalgia     in 1990s    Headache(784.0)     Hemiplegia and hemiparesis following cerebral infarction affecting left non-dominant side (HCC)     in 1990s    Hydronephrosis 09/01/2023    Urogenital Implants, calculus of ureter, UTI    Hyperlipidemia     Hypertension     in 1980s    Irritable bowel syndrome     in 1990s    Kidney failure     Chronic Kidney Disease, Renal Dialysis started in 1/2023    Osteoporosis 2019    Unspecified cerebral artery occlusion with cerebral infarction     Unspecified sleep apnea     Wrist fracture, right 2018    Treated with casting           Electronically signed by Nora Zamora RN on 7/8/2024 at 2:14 PM

## 2024-07-08 NOTE — CONSULTS
Patient seen and examined by me during hemodialysis  Patient appears to be her baseline mental status  She knows where she is she knows who I am  Blood pressure 154 systolic UF 2400 mL  Will place her on a Monday Wednesday Friday schedule  See full consult for additional details    To Cuadra MD

## 2024-07-09 LAB
ANION GAP SERPL CALC-SCNC: 22 MEQ/L (ref 8–16)
BACTERIA URNS QL MICRO: ABNORMAL /HPF
BILIRUB UR QL STRIP.AUTO: NEGATIVE
BUN SERPL-MCNC: 38 MG/DL (ref 7–22)
CALCIUM SERPL-MCNC: 9.5 MG/DL (ref 8.5–10.5)
CASTS #/AREA URNS LPF: ABNORMAL /LPF
CASTS 2: ABNORMAL /LPF
CHARACTER UR: CLEAR
CHLORIDE SERPL-SCNC: 94 MEQ/L (ref 98–111)
CO2 SERPL-SCNC: 21 MEQ/L (ref 23–33)
COLOR, UA: YELLOW
CREAT SERPL-MCNC: 4.8 MG/DL (ref 0.4–1.2)
CRYSTALS URNS MICRO: ABNORMAL
DEPRECATED RDW RBC AUTO: 68.2 FL (ref 35–45)
EPITHELIAL CELLS, UA: ABNORMAL /HPF
ERYTHROCYTE [DISTWIDTH] IN BLOOD BY AUTOMATED COUNT: 18 % (ref 11.5–14.5)
GFR SERPL CREATININE-BSD FRML MDRD: 9 ML/MIN/1.73M2
GLUCOSE SERPL-MCNC: 63 MG/DL (ref 70–108)
GLUCOSE UR QL STRIP.AUTO: 100 MG/DL
HCT VFR BLD AUTO: 47 % (ref 37–47)
HGB BLD-MCNC: 14.8 GM/DL (ref 12–16)
HGB UR QL STRIP.AUTO: ABNORMAL
KETONES UR QL STRIP.AUTO: NEGATIVE
MCH RBC QN AUTO: 32.5 PG (ref 26–33)
MCHC RBC AUTO-ENTMCNC: 31.5 GM/DL (ref 32.2–35.5)
MCV RBC AUTO: 103.1 FL (ref 81–99)
MISCELLANEOUS 2: ABNORMAL
NITRITE UR QL STRIP: NEGATIVE
PH UR STRIP.AUTO: 7 [PH] (ref 5–9)
PLATELET # BLD AUTO: 366 THOU/MM3 (ref 130–400)
PMV BLD AUTO: 10.5 FL (ref 9.4–12.4)
POTASSIUM SERPL-SCNC: 4.4 MEQ/L (ref 3.5–5.2)
PROT UR STRIP.AUTO-MCNC: 100 MG/DL
RBC # BLD AUTO: 4.56 MILL/MM3 (ref 4.2–5.4)
RBC URINE: ABNORMAL /HPF
RENAL EPI CELLS #/AREA URNS HPF: ABNORMAL /[HPF]
SODIUM SERPL-SCNC: 137 MEQ/L (ref 135–145)
SP GR UR REFRACT.AUTO: 1.02 (ref 1–1.03)
UROBILINOGEN, URINE: 0.2 EU/DL (ref 0–1)
WBC # BLD AUTO: 8.2 THOU/MM3 (ref 4.8–10.8)
WBC #/AREA URNS HPF: ABNORMAL /HPF
WBC #/AREA URNS HPF: NEGATIVE /[HPF]
YEAST LIKE FUNGI URNS QL MICRO: ABNORMAL

## 2024-07-09 PROCEDURE — 51798 US URINE CAPACITY MEASURE: CPT

## 2024-07-09 PROCEDURE — 36415 COLL VENOUS BLD VENIPUNCTURE: CPT

## 2024-07-09 PROCEDURE — 6360000002 HC RX W HCPCS

## 2024-07-09 PROCEDURE — 2580000003 HC RX 258

## 2024-07-09 PROCEDURE — 99232 SBSQ HOSP IP/OBS MODERATE 35: CPT

## 2024-07-09 PROCEDURE — 1200000003 HC TELEMETRY R&B

## 2024-07-09 PROCEDURE — 81001 URINALYSIS AUTO W/SCOPE: CPT

## 2024-07-09 PROCEDURE — 80048 BASIC METABOLIC PNL TOTAL CA: CPT

## 2024-07-09 PROCEDURE — 99232 SBSQ HOSP IP/OBS MODERATE 35: CPT | Performed by: INTERNAL MEDICINE

## 2024-07-09 PROCEDURE — 6370000000 HC RX 637 (ALT 250 FOR IP)

## 2024-07-09 PROCEDURE — 85027 COMPLETE CBC AUTOMATED: CPT

## 2024-07-09 RX ORDER — DROPERIDOL 2.5 MG/ML
1.25 INJECTION, SOLUTION INTRAMUSCULAR; INTRAVENOUS EVERY 6 HOURS PRN
Status: DISCONTINUED | OUTPATIENT
Start: 2024-07-09 | End: 2024-07-10

## 2024-07-09 RX ORDER — DEXAMETHASONE SODIUM PHOSPHATE 10 MG/ML
10 INJECTION, EMULSION INTRAMUSCULAR; INTRAVENOUS ONCE
Status: COMPLETED | OUTPATIENT
Start: 2024-07-09 | End: 2024-07-09

## 2024-07-09 RX ADMIN — ACETAMINOPHEN 650 MG: 325 TABLET ORAL at 10:02

## 2024-07-09 RX ADMIN — BUPROPION HYDROCHLORIDE 150 MG: 300 TABLET, EXTENDED RELEASE ORAL at 10:03

## 2024-07-09 RX ADMIN — SODIUM CHLORIDE, PRESERVATIVE FREE 10 ML: 5 INJECTION INTRAVENOUS at 19:27

## 2024-07-09 RX ADMIN — AMLODIPINE BESYLATE 10 MG: 10 TABLET ORAL at 10:03

## 2024-07-09 RX ADMIN — SERTRALINE HYDROCHLORIDE 50 MG: 50 TABLET ORAL at 10:03

## 2024-07-09 RX ADMIN — FOLIC ACID 500 MCG: 1 TABLET ORAL at 10:03

## 2024-07-09 RX ADMIN — PRIMIDONE 50 MG: 50 TABLET ORAL at 10:04

## 2024-07-09 RX ADMIN — MEGESTROL ACETATE 40 MG: 40 TABLET ORAL at 10:02

## 2024-07-09 RX ADMIN — CLOPIDOGREL BISULFATE 75 MG: 75 TABLET ORAL at 10:02

## 2024-07-09 RX ADMIN — CARBIDOPA AND LEVODOPA 1 TABLET: 10; 100 TABLET ORAL at 17:02

## 2024-07-09 RX ADMIN — DEXAMETHASONE SODIUM PHOSPHATE 10 MG: 10 INJECTION, EMULSION INTRAMUSCULAR; INTRAVENOUS at 17:02

## 2024-07-09 RX ADMIN — SEVELAMER CARBONATE 800 MG: 800 TABLET, FILM COATED ORAL at 10:03

## 2024-07-09 RX ADMIN — SEVELAMER CARBONATE 800 MG: 800 TABLET, FILM COATED ORAL at 17:02

## 2024-07-09 RX ADMIN — ACETAMINOPHEN 650 MG: 325 TABLET ORAL at 19:33

## 2024-07-09 RX ADMIN — CARBIDOPA AND LEVODOPA 1 TABLET: 10; 100 TABLET ORAL at 10:03

## 2024-07-09 RX ADMIN — LEVETIRACETAM 500 MG: 500 TABLET, FILM COATED ORAL at 10:03

## 2024-07-09 RX ADMIN — SODIUM CHLORIDE, PRESERVATIVE FREE 10 ML: 5 INJECTION INTRAVENOUS at 10:11

## 2024-07-09 RX ADMIN — BUPROPION HYDROCHLORIDE 300 MG: 300 TABLET, EXTENDED RELEASE ORAL at 10:11

## 2024-07-09 RX ADMIN — CARBIDOPA AND LEVODOPA 1 TABLET: 10; 100 TABLET ORAL at 06:18

## 2024-07-09 ASSESSMENT — PAIN DESCRIPTION - LOCATION
LOCATION: HEAD
LOCATION: HEAD

## 2024-07-09 ASSESSMENT — PAIN DESCRIPTION - ORIENTATION
ORIENTATION: PROXIMAL
ORIENTATION: RIGHT

## 2024-07-09 ASSESSMENT — PAIN SCALES - GENERAL
PAINLEVEL_OUTOF10: 5
PAINLEVEL_OUTOF10: 3
PAINLEVEL_OUTOF10: 10
PAINLEVEL_OUTOF10: 10

## 2024-07-09 ASSESSMENT — PAIN DESCRIPTION - ONSET: ONSET: ON-GOING

## 2024-07-09 ASSESSMENT — PAIN DESCRIPTION - PAIN TYPE: TYPE: ACUTE PAIN

## 2024-07-09 ASSESSMENT — PAIN DESCRIPTION - DESCRIPTORS
DESCRIPTORS: THROBBING;TIGHTNESS
DESCRIPTORS: ACHING

## 2024-07-09 ASSESSMENT — PAIN DESCRIPTION - FREQUENCY: FREQUENCY: INTERMITTENT

## 2024-07-09 NOTE — PROGRESS NOTES
[facility]   Nephrology progress note  7/9/2024, 9:25 AM      Pt Name:    Deloris Watts  MRN:     [id number]     YOB: 1955  Admit Date:    7/8/2024  8:33 AM    Chief Complaint: Nephrology following for ESRD on hemodialysis.    Subjective:  Patient seen and examined  No chest pain or shortness of breath  Feels okay    Objective:  24HR INTAKE/OUTPUT:    Intake/Output Summary (Last 24 hours) at 7/9/2024 0925  Last data filed at 7/9/2024 0258  Gross per 24 hour   Intake 350 ml   Output 2000 ml   Net -1650 ml      Admission weight: 45.8 kg (101 lb)  Wt Readings from Last 3 Encounters:   07/08/24 49.7 kg (109 lb 9.6 oz)   06/26/24 47.2 kg (104 lb)   06/19/24 46.4 kg (102 lb 6.4 oz)        Vitals :   Vitals:    07/08/24 2111 07/08/24 2329 07/09/24 0258 07/09/24 0730   BP: (!) 141/68 (!) 160/72 (!) 150/69 135/69   Pulse: 93 88 83 90   Resp: 16 16 16 16   Temp: 98.2 °F (36.8 °C) 98.5 °F (36.9 °C) 98.4 °F (36.9 °C) 97.5 °F (36.4 °C)   TempSrc: Oral Oral Oral Oral   SpO2: 99% 93% 98% 96%   Weight:       Height:           Physical examination  General Appearance:  Well developed. No distress  Mouth/Throat:  Oral mucosa moist  Neck:  Supple, no JVD  Lungs:  Breath sounds: clear  Heart::  S1,S2 heard  Abdomen:  Soft, non - tender  Musculoskeletal:  Edema -no edema noted    Medications:  Infusion:    sodium chloride       Meds:    sodium chloride flush  5-40 mL IntraVENous 2 times per day    carbidopa-levodopa  1 tablet Oral TID    clopidogrel  75 mg Oral Daily    folic acid  500 mcg Oral Daily    levETIRAcetam  500 mg Oral Daily    megestrol  40 mg Oral Daily    primidone  50 mg Oral Daily    sertraline  50 mg Oral Daily    sevelamer  800 mg Oral BID with meals    amLODIPine  10 mg Oral Daily    buPROPion  150 mg Oral QAM    buPROPion  300 mg Oral Daily       Lab Data :  CBC:   Recent Labs     07/08/24  0935 07/09/24  0524   WBC 9.1 8.2   HGB 15.5 14.8   HCT 50.3* 47.0    366      CMP:  Recent Labs     07/08/24  0935 07/09/24  0524    137   K 5.4* 4.4   CL 92* 94*   CO2 23 21*   BUN 61* 38*   CREATININE 6.4* 4.8*   GLUCOSE 86 63*   CALCIUM 9.8 9.5     Hepatic:   Recent Labs     07/08/24  0935   AST 17   ALT <5*   BILITOT 0.5   ALKPHOS 138*       Assessment and Plan:  Renal -ESRD on hemodialysis Monday Wednesday Friday  volume status reasonable no acute need for dialysis  Electrolytes -hyperkalemia status post HD on a 2K bath doing better  Essential hypertension  Change in mental status etiology not certain it appears to be baseline from the renal standpoint  Anemia of renal dysfunction  Recent diagnosis of Parkinson's  Meds reviewed and discussed with patient and     To Cuadra MD  [facility]    This report has been created using voice recognition software. It may contain minor errors which are inherent in voice recognition technology

## 2024-07-09 NOTE — CARE COORDINATION
7/9/24, 12:25 PM EDT    DISCHARGE PLANNING EVALUATION       Patient is current with [facility] for PT/OT and nursing services. Spouse is primary caregiver and provides transportation. Patient has declined any other needs. SW left message with Lexis at [facility] regarding Patient's admission.

## 2024-07-09 NOTE — CARE COORDINATION
for better evaluation in this patient. 8. Encephalomalacia in the distribution of the right middle cerebral artery. 9. Heterogeneous right and left lobes of the thyroid gland. 10. Postoperative changes in the lower cervical spine  7/9 MRI Brain: No evidence of acute infarction. 2. Old infarct in the right frontal lobe.   3. Mild-moderate severity chronic small vessel ischemic changes    Patient Goals/Plan/Treatment Preferences: Met w/ Deloris and her  Tenzin. Verified PCP and insurance. Tenzin is Deloris's fulltime caregiver at home. He provides all transportation. They have all needed DME, did inquire about external catheter- informed it isn't covered by insurance and for costs, Tenzin plans to look online. Current HD at [facility] MWF 3pm. Current with [facility] for nursing, PT/OT. Plans to return home w/  and current services/DME. SW consulted.     Tenzin asked for more help at home w/ housework as caring for Deloris \"is a 24/7 job\" and he wants to keep her at home. Provided with contact information for [facility] and a private duty list.      07/09/24 0905   Service Assessment   Patient Orientation Alert and Oriented;Person;Place   Cognition Severely Impaired   History Provided By Spouse   Primary Caregiver Spouse   Accompanied By/Relationship spouse: Tenzin   Support Systems Spouse/Significant Other;Children;Home Care Staff   Patient's Healthcare Decision Maker is: Patient Declined (Legal Next of Kin Remains as Decision Maker)   PCP Verified by CM Yes   Last Visit to PCP Within last 3 months   Prior Functional Level Assistance with the following:;Bathing;Dressing;Toileting;Cooking;Housework;Shopping;Feeding;Mobility   Current Functional Level Feeding;Housework;Shopping;Cooking;Mobility;Bathing;Dressing;Toileting;Assistance with the following:   Can patient return to prior living arrangement Yes   Ability to make needs known: Fair   Family able to assist with home care needs: Yes   Would you like for me to  discuss the discharge plan with any other family members/significant others, and if so, who? Yes  ( at bedside)   Financial Resources Medicare;Other (Comment)  (Secondary: BCBS)   Community Resources Other (Comment);ECF/Home Care  (HD)   CM/SW Referral ADLs/IADLs;DME   Social/Functional History   Lives With Spouse   Type of Home House   Active  No   Patient's  Info spouse provides transportation needs   Discharge Planning   Type of Residence House   Living Arrangements Spouse/Significant Other   Current Services Prior To Admission Durable Medical Equipment;Home Care   Current DME Prior to Arrival Walker;Shower Chair;Wheelchair   Potential Assistance Needed N/A   DME Ordered? No   Potential Assistance Purchasing Medications No   Type of Home Care Services PT;OT;Nursing Services   Patient expects to be discharged to: House   History of falls? 1   Services At/After Discharge   Confirm Follow Up Transport Family   Condition of Participation: Discharge Planning   The Plan for Transition of Care is related to the following treatment goals: \"There's not enough time\"   The Patient and/or Patient Representative was provided with a Choice of Provider? Patient   The Patient and/Or Patient Representative agree with the Discharge Plan? Yes   Freedom of Choice list was provided with basic dialogue that supports the patient's individualized plan of care/goals, treatment preferences, and shares the quality data associated with the providers?  Yes

## 2024-07-09 NOTE — PROGRESS NOTES
Hospitalist Progress Note      Patient:  Deloris Watts 68 y.o. female       : 1955  Unit/Bed:-002-A    Date of Admission: 2024      ASSESSMENT AND PLAN    Active Problems  Headache, Intractable  Reports ongoing headache for past 2 days prior to admission.  CT head and CTA head and neck which shows no significant findings.  Trial droperidol q6 hrs and decadron 10 mg once.   Continue tylenol as needed   ESRD on HD  Follows with Dr. Cuadra.  [facility] schedule.  Continue Renvela  Nephrology consulted and following  History of small left subdural hematoma  Occurred 2024 due to mechanical fall.  Neurosurgery consulted at time patient received TXA.  No surgical intervention was performed.  CT head on admission showed stable CT scan of brain no interval change in , no hemorrhage  Continue Keppra for seizure prophylaxis    Resolved Problems  Encephalopathy: Returned to baseline per patient's  at bedside.  CT head, CTA head and neck with no significant findings  MRI brain negative for acute findings  Initially,  reported concern for worsening confusion and hallucinations.  UA pending    Chronic Conditions (reviewed, stable, and home medications resumed, unless otherwise stated)  History of ischemic CVA: Chronic left-sided weakness, continue home Plavix  Parkinson's disease: Continue Sinemet and primidone  Essential HTN: Continue home amlodipine  Anxiety/depression: Continue Zoloft and Wellbutrin      LDA: []CVC / []PICC / []Midline / []Smith / []Drains / []Mediport / [x]None  Antibiotics: none  Steroids: none  Labs (still needed?): [x]Yes / []No  IVF (still needed?): []Yes / [x]No    Level of care: []Step Down / [x]Med-Surg  Bed Status: [x]Inpatient / []Observation  Telemetry: [x]Yes / []No  PT/OT: [x]Yes / []No    DVT Prophylaxis: [] Lovenox / [] Heparin / [x] SCDs / [] Already on Systemic Anticoagulation / [] None     Expected discharge date:  7/10-  Disposition: home  Code

## 2024-07-09 NOTE — CARE COORDINATION
07/09/24 0918   Readmission Assessment   Number of Days since last admission? 8-30 days  (Dc 5/29 to [facility], Dc from [facility] 6/15 / [facility])   Previous Disposition Acute Rehab  (N/a: Dc 5/29 to [facility], Dc from [facility] 6/15 [facility])   Who is being Interviewed Patient;Caregiver   What was the patient's/caregiver's perception as to why they think they needed to return back to the hospital? Other (Comment)  (N/a: Dc 5/29 to [facility], Dc from [facility] 6/15 / [facility])   Did you visit your Primary Care Physician after you left the hospital, before you returned this time? Yes   Did you see a specialist, such as Cardiac, Pulmonary, Orthopedic Physician, etc. after you left the hospital? Yes   Who advised the patient to return to the hospital? Self-referral   Does the patient report anything that got in the way of taking their medications? No   In our efforts to provide the best possible care to you and others like you, can you think of anything that we could have done to help you after you left the hospital the first time, so that you might not have needed to return so soon? Other (Comment)  (N/a: Dc 5/29 to [facility], Dc from [facility] 6/15 [facility])

## 2024-07-09 NOTE — PROGRESS NOTES
07/09/24 1151   Encounter Summary   Encounter Overview/Reason Spiritual/Emotional Needs   Service Provided For Patient and family together   Referral/Consult From Rounding   Support System Spouse;Children;Family members   Last Encounter  07/09/24   Complexity of Encounter Moderate   Begin Time 1142   End Time  1151   Total Time Calculated 9 min   Spiritual/Emotional needs   Type Spiritual Support   Assessment/Intervention/Outcome   Assessment Coping   Intervention Nurtured Hope;Prayer (assurance of)/[place];Sustaining Presence/Ministry of presence   Outcome Coping     Assessment:  In my encounter with the 68 yr old patient the pt's family was supportively present. While rounding the unit 6K,  I provided spiritual care to patient and their family through conversation, I also came to assess their spiritual needs. The pt was admitted due to encephalopathy.     Interventions:  I provided, prayer, emotional support and words of comfort.  provided a listening presence and encouraged pt to share their beliefs and how I can support them during their hospitalization.       Outcomes:  The patient was encouraged and didn't share any further spiritual needs at this time. The pt remains optimistic and hopeful. The pt shared that they were appreciative for the support.     Plan:  Chaplains will follow-up at a later time for assessment of any spiritual care needs present.

## 2024-07-09 NOTE — PROCEDURES
PROCEDURE NOTE  Date: 7/9/2024   Name: Deloris Watts  YOB: 1955    Procedures  Bladder scan completed in the amount of 48 ml and handed to RN

## 2024-07-09 NOTE — PLAN OF CARE
Problem: Discharge Planning  Goal: Discharge to home or other facility with appropriate resources  Outcome: Progressing  Flowsheets (Taken 7/9/2024 0004)  Discharge to home or other facility with appropriate resources: Identify barriers to discharge with patient and caregiver     Problem: Skin/Tissue Integrity  Goal: Absence of new skin breakdown  Description: 1.  Monitor for areas of redness and/or skin breakdown  2.  Assess vascular access sites hourly  3.  Every 4-6 hours minimum:  Change oxygen saturation probe site  4.  Every 4-6 hours:  If on nasal continuous positive airway pressure, respiratory therapy assess nares and determine need for appliance change or resting period.  Outcome: Progressing     Problem: Safety - Adult  Goal: Free from fall injury  Outcome: Progressing  Flowsheets (Taken 7/9/2024 0004)  Free From Fall Injury: Instruct family/caregiver on patient safety

## 2024-07-09 NOTE — PLAN OF CARE
Problem: Discharge Planning  Goal: Discharge to home or other facility with appropriate resources  7/9/2024 1227 by Paula Foley, MSW, LSW  Outcome: Progressing  Flowsheets (Taken 7/9/2024 1227)  Discharge to home or other facility with appropriate resources: Identify barriers to discharge with patient and caregiver  Note: Return home with [facility] and spouse, see SW notes 7/9/24.

## 2024-07-10 LAB
ANION GAP SERPL CALC-SCNC: 20 MEQ/L (ref 8–16)
BUN SERPL-MCNC: 60 MG/DL (ref 7–22)
CALCIUM SERPL-MCNC: 9.2 MG/DL (ref 8.5–10.5)
CHLORIDE SERPL-SCNC: 95 MEQ/L (ref 98–111)
CO2 SERPL-SCNC: 18 MEQ/L (ref 23–33)
CREAT SERPL-MCNC: 6.2 MG/DL (ref 0.4–1.2)
DEPRECATED RDW RBC AUTO: 67.3 FL (ref 35–45)
ERYTHROCYTE [DISTWIDTH] IN BLOOD BY AUTOMATED COUNT: 17.4 % (ref 11.5–14.5)
GFR SERPL CREATININE-BSD FRML MDRD: 7 ML/MIN/1.73M2
GLUCOSE SERPL-MCNC: 138 MG/DL (ref 70–108)
HCT VFR BLD AUTO: 46.6 % (ref 37–47)
HGB BLD-MCNC: 14.2 GM/DL (ref 12–16)
MCH RBC QN AUTO: 31.4 PG (ref 26–33)
MCHC RBC AUTO-ENTMCNC: 30.5 GM/DL (ref 32.2–35.5)
MCV RBC AUTO: 103.1 FL (ref 81–99)
PLATELET # BLD AUTO: 347 THOU/MM3 (ref 130–400)
PMV BLD AUTO: 10.5 FL (ref 9.4–12.4)
POTASSIUM SERPL-SCNC: 5.4 MEQ/L (ref 3.5–5.2)
RBC # BLD AUTO: 4.52 MILL/MM3 (ref 4.2–5.4)
SCAN OF BLOOD SMEAR: NORMAL
SODIUM SERPL-SCNC: 133 MEQ/L (ref 135–145)
WBC # BLD AUTO: 7.8 THOU/MM3 (ref 4.8–10.8)

## 2024-07-10 PROCEDURE — 99232 SBSQ HOSP IP/OBS MODERATE 35: CPT

## 2024-07-10 PROCEDURE — 90935 HEMODIALYSIS ONE EVALUATION: CPT

## 2024-07-10 PROCEDURE — 99232 SBSQ HOSP IP/OBS MODERATE 35: CPT | Performed by: INTERNAL MEDICINE

## 2024-07-10 PROCEDURE — 1200000003 HC TELEMETRY R&B

## 2024-07-10 PROCEDURE — 97162 PT EVAL MOD COMPLEX 30 MIN: CPT

## 2024-07-10 PROCEDURE — 97110 THERAPEUTIC EXERCISES: CPT

## 2024-07-10 PROCEDURE — 36415 COLL VENOUS BLD VENIPUNCTURE: CPT

## 2024-07-10 PROCEDURE — 6370000000 HC RX 637 (ALT 250 FOR IP)

## 2024-07-10 PROCEDURE — 80048 BASIC METABOLIC PNL TOTAL CA: CPT

## 2024-07-10 PROCEDURE — 2580000003 HC RX 258

## 2024-07-10 PROCEDURE — 85027 COMPLETE CBC AUTOMATED: CPT

## 2024-07-10 RX ORDER — LIDOCAINE 4 G/G
1 PATCH TOPICAL DAILY
Status: DISCONTINUED | OUTPATIENT
Start: 2024-07-10 | End: 2024-07-11 | Stop reason: HOSPADM

## 2024-07-10 RX ORDER — DROPERIDOL 2.5 MG/ML
1.25 INJECTION, SOLUTION INTRAMUSCULAR; INTRAVENOUS EVERY 6 HOURS PRN
Status: DISCONTINUED | OUTPATIENT
Start: 2024-07-10 | End: 2024-07-11 | Stop reason: HOSPADM

## 2024-07-10 RX ADMIN — BUPROPION HYDROCHLORIDE 300 MG: 300 TABLET, EXTENDED RELEASE ORAL at 08:09

## 2024-07-10 RX ADMIN — AMLODIPINE BESYLATE 10 MG: 10 TABLET ORAL at 08:09

## 2024-07-10 RX ADMIN — ACETAMINOPHEN 650 MG: 325 TABLET ORAL at 03:21

## 2024-07-10 RX ADMIN — SODIUM CHLORIDE, PRESERVATIVE FREE 10 ML: 5 INJECTION INTRAVENOUS at 19:24

## 2024-07-10 RX ADMIN — CARBIDOPA AND LEVODOPA 1 TABLET: 10; 100 TABLET ORAL at 17:35

## 2024-07-10 RX ADMIN — BUPROPION HYDROCHLORIDE 150 MG: 300 TABLET, EXTENDED RELEASE ORAL at 08:08

## 2024-07-10 RX ADMIN — FOLIC ACID 500 MCG: 1 TABLET ORAL at 08:09

## 2024-07-10 RX ADMIN — LEVETIRACETAM 500 MG: 500 TABLET, FILM COATED ORAL at 08:09

## 2024-07-10 RX ADMIN — SODIUM CHLORIDE, PRESERVATIVE FREE 10 ML: 5 INJECTION INTRAVENOUS at 08:09

## 2024-07-10 RX ADMIN — SERTRALINE HYDROCHLORIDE 50 MG: 50 TABLET ORAL at 08:09

## 2024-07-10 RX ADMIN — SEVELAMER CARBONATE 800 MG: 800 TABLET, FILM COATED ORAL at 17:35

## 2024-07-10 RX ADMIN — ACETAMINOPHEN 650 MG: 325 TABLET ORAL at 17:34

## 2024-07-10 RX ADMIN — PRIMIDONE 50 MG: 50 TABLET ORAL at 08:09

## 2024-07-10 RX ADMIN — CLOPIDOGREL BISULFATE 75 MG: 75 TABLET ORAL at 08:09

## 2024-07-10 RX ADMIN — CARBIDOPA AND LEVODOPA 1 TABLET: 10; 100 TABLET ORAL at 05:11

## 2024-07-10 RX ADMIN — CARBIDOPA AND LEVODOPA 1 TABLET: 10; 100 TABLET ORAL at 11:36

## 2024-07-10 RX ADMIN — MEGESTROL ACETATE 40 MG: 40 TABLET ORAL at 08:09

## 2024-07-10 RX ADMIN — SEVELAMER CARBONATE 800 MG: 800 TABLET, FILM COATED ORAL at 08:09

## 2024-07-10 ASSESSMENT — PAIN DESCRIPTION - PAIN TYPE: TYPE: ACUTE PAIN

## 2024-07-10 ASSESSMENT — PAIN DESCRIPTION - ORIENTATION: ORIENTATION: RIGHT;LEFT;ANTERIOR

## 2024-07-10 ASSESSMENT — PAIN DESCRIPTION - FREQUENCY: FREQUENCY: INTERMITTENT

## 2024-07-10 ASSESSMENT — PAIN DESCRIPTION - ONSET: ONSET: ON-GOING

## 2024-07-10 ASSESSMENT — PAIN - FUNCTIONAL ASSESSMENT: PAIN_FUNCTIONAL_ASSESSMENT: ACTIVITIES ARE NOT PREVENTED

## 2024-07-10 ASSESSMENT — PAIN DESCRIPTION - LOCATION
LOCATION: HEAD
LOCATION: HEAD
LOCATION: NECK
LOCATION: NECK

## 2024-07-10 ASSESSMENT — PAIN SCALES - GENERAL
PAINLEVEL_OUTOF10: 8
PAINLEVEL_OUTOF10: 2
PAINLEVEL_OUTOF10: 3
PAINLEVEL_OUTOF10: 7

## 2024-07-10 ASSESSMENT — PAIN DESCRIPTION - DESCRIPTORS
DESCRIPTORS: ACHING;DISCOMFORT
DESCRIPTORS: THROBBING

## 2024-07-10 NOTE — PROGRESS NOTES
[facility]  INPATIENT PHYSICAL THERAPY  EVALUATION  STRZ RENAL TELEMETRY 6K - 6K-02/002-A    Time In: 1556  Time Out: 1613  Timed Code Treatment Minutes: 9 Minutes  Minutes: 17          Date: 7/10/2024  Patient Name: Deloris Watts,  Gender:  female        MRN: [id number]  : 1955  (68 y.o.)      Referring Practitioner: Zakiya Masterson PA-C  Diagnosis: Encephalopathy  Additional Pertinent Hx: Per HPI, \"Deloris Watts is a 68 y.o. female with PMHx of ESRD on HD, hypertension, Parkinson disease, anxiety depression, Hx of small subdural hematoma, Hx of ischemic CVA who presents to [facility] with headache.  Patient reports that she has had headache for the past 2 days which is progressively worsened provide she decided to present to the ED.  Patient examined while on HD, patient states that headache is improved after medications given in the ED.  Patient states headache is still present.  Patient alert to self and location.  Patient states she has not previously had a headache like this before.  Per ED report, patient's  stated the patient was not at her normal baseline mentation, reported concern of significant worsening confusion and some visual type hallucinations.  However, patient was evaluated by Dr. Cuadra, her nephrologist, who states that she is at her baseline mental status.  Unable to speak to  about his concerns regarding patient.  Denies any chest pain, SOB, N/V/C/D, diaphoresis.  Patient states she feels as if she was in her normal state of health except for headache.\"     Restrictions/Precautions:       Subjective:  Chart Reviewed: Yes  Patient assessed for rehabilitation services?: Yes  Subjective: Pt in bed and reports needing to use toilet. Pt agrees to PT assisting her to get to the bathroom.    General:     Vision: Impaired  Vision Exceptions: Wears glasses at all times  Hearing: Within functional limits       Pain: did not report     Vitals:

## 2024-07-10 NOTE — CARE COORDINATION
7/10/24, 2:05 PM EDT    Home with  and current [facility]. MWF HD at [facility].   Patient goals/plan/ treatment preferences discussed by  and .  Patient goals/plan/ treatment preferences reviewed with patient/ family.  Patient/ family verbalize understanding of discharge plan and are in agreement with goal/plan/treatment preferences.  Understanding was demonstrated using the teach back method.  AVS provided by RN at time of discharge, which includes all necessary medical information pertaining to the patients current course of illness, treatment, post-discharge goals of care, and treatment preferences.     Services At/After Discharge: Home Health

## 2024-07-10 NOTE — PROGRESS NOTES
[facility]   Nephrology progress note  7/10/2024, 11:29 AM      Pt Name:    Deloris Watts  MRN:     [id number]     YOB: 1955  Admit Date:    7/8/2024  8:33 AM    Chief Complaint: Nephrology following for ESRD on hemodialysis.    Subjective:  Patient seen and examined  No chest pain or shortness of breath  Feels okay but awake and alert   at bedside    Objective:  24HR INTAKE/OUTPUT:    Intake/Output Summary (Last 24 hours) at 7/10/2024 1129  Last data filed at 7/10/2024 0924  Gross per 24 hour   Intake 925 ml   Output 50 ml   Net 875 ml        Admission weight: 45.8 kg (101 lb)  Wt Readings from Last 3 Encounters:   07/10/24 47.6 kg (105 lb)   06/26/24 47.2 kg (104 lb)   06/19/24 46.4 kg (102 lb 6.4 oz)        Vitals :   Vitals:    07/09/24 1925 07/09/24 2351 07/10/24 0426 07/10/24 0745   BP: 132/64 (!) 140/66 (!) 151/73 (!) 150/73   Pulse: 88 93 93 93   Resp: 16 16 16 16   Temp: 97.9 °F (36.6 °C) 98.3 °F (36.8 °C) 98.3 °F (36.8 °C) 97.5 °F (36.4 °C)   TempSrc: Oral Oral Oral Oral   SpO2: 96% 100% 95% 97%   Weight:   47.6 kg (105 lb)    Height:           Physical examination  General Appearance:  Well developed. No distress  Mouth/Throat:  Oral mucosa moist  Neck:  Supple, no JVD  Lungs:  Breath sounds: clear  Heart::  S1,S2 heard  Abdomen:  Soft, non - tender  Musculoskeletal:  Edema -no edema noted    Medications:  Infusion:    sodium chloride       Meds:    lidocaine  1 patch TransDERmal Daily    sodium chloride flush  5-40 mL IntraVENous 2 times per day    carbidopa-levodopa  1 tablet Oral TID    clopidogrel  75 mg Oral Daily    folic acid  500 mcg Oral Daily    levETIRAcetam  500 mg Oral Daily    megestrol  40 mg Oral Daily    primidone  50 mg Oral Daily    sertraline  50 mg Oral Daily    sevelamer  800 mg Oral BID with meals    amLODIPine  10 mg Oral Daily    buPROPion  150 mg Oral QAM    buPROPion  300 mg Oral Daily       Lab Data :  CBC:   Recent Labs

## 2024-07-10 NOTE — PROGRESS NOTES
Patient mated to hospital service for further management treatment of suspected encephalopathy and current headache.    Medications:    Infusion Medications    sodium chloride      Scheduled Medications    lidocaine  1 patch TransDERmal Daily    sodium chloride flush  5-40 mL IntraVENous 2 times per day    carbidopa-levodopa  1 tablet Oral TID    clopidogrel  75 mg Oral Daily    folic acid  500 mcg Oral Daily    levETIRAcetam  500 mg Oral Daily    megestrol  40 mg Oral Daily    primidone  50 mg Oral Daily    sertraline  50 mg Oral Daily    sevelamer  800 mg Oral BID with meals    amLODIPine  10 mg Oral Daily    buPROPion  150 mg Oral QAM    buPROPion  300 mg Oral Daily    PRN Meds: droPERidol, sodium chloride flush, sodium chloride, ondansetron **OR** ondansetron, polyethylene glycol, acetaminophen **OR** acetaminophen, traZODone    Exam:  BP (!) 167/77   Pulse 93   Temp 97.3 °F (36.3 °C)   Resp 16   Ht 1.524 m (5')   Wt 46.6 kg (102 lb 11.8 oz)   SpO2 97%   BMI 20.06 kg/m²   General: No distress, appears stated age.  Eyes:  PERRL. Conjunctivae/corneas clear.  HENT: Head normal appearing. Nares normal. Oral mucosa moist.  Hearing intact.   Neck: Supple, with full range of motion. Trachea midline.  Tenderness to palpation right side  Respiratory:  Normal effort. Clear to auscultation, without rales or wheezes or rhonchi.  Cardiovascular: Normal rate, regular rhythm with normal S1/S2 without murmurs.    No lower extremity edema.   Abdomen: Soft, non-tender, non-distended with normal bowel sounds.  Musculoskeletal: No joint swelling or tenderness. Normal tone. No abnormal movements.   Skin: Warm and dry. No rashes or lesions.  Neurologic:  No focal sensory/motor deficits in the upper or lower extremities. Cranial nerves:  grossly non-focal 2-12.     Psychiatric: Alert and oriented, normal insight and thought content.       Labs/Radiology: See chart or assessment above.     Electronically signed by Zaikya Scott

## 2024-07-10 NOTE — PLAN OF CARE
Problem: Pain  Goal: Verbalizes/displays adequate comfort level or baseline comfort level  Outcome: Not Progressing  Flowsheets (Taken 7/9/2024 1933)  Verbalizes/displays adequate comfort level or baseline comfort level: Encourage patient to monitor pain and request assistance     Problem: Discharge Planning  Goal: Discharge to home or other facility with appropriate resources  Outcome: Progressing     Problem: Skin/Tissue Integrity  Goal: Absence of new skin breakdown  Description: 1.  Monitor for areas of redness and/or skin breakdown  2.  Assess vascular access sites hourly  3.  Every 4-6 hours minimum:  Change oxygen saturation probe site  4.  Every 4-6 hours:  If on nasal continuous positive airway pressure, respiratory therapy assess nares and determine need for appliance change or resting period.  Outcome: Progressing     Problem: Safety - Adult  Goal: Free from fall injury  Outcome: Progressing     Problem: Chronic Conditions and Co-morbidities  Goal: Patient's chronic conditions and co-morbidity symptoms are monitored and maintained or improved  Outcome: Progressing     Problem: Pain  Goal: Verbalizes/displays adequate comfort level or baseline comfort level  Outcome: Not Progressing  Flowsheets (Taken 7/9/2024 1933)  Verbalizes/displays adequate comfort level or baseline comfort level: Encourage patient to monitor pain and request assistance

## 2024-07-10 NOTE — PLAN OF CARE
Problem: Discharge Planning  Goal: Discharge to home or other facility with appropriate resources  7/10/2024 1237 by Krys Chen RN  Outcome: Progressing  7/10/2024 0309 by Eliecer Mack RN  Outcome: Progressing     Problem: Skin/Tissue Integrity  Goal: Absence of new skin breakdown  Description: 1.  Monitor for areas of redness and/or skin breakdown  2.  Assess vascular access sites hourly  3.  Every 4-6 hours minimum:  Change oxygen saturation probe site  4.  Every 4-6 hours:  If on nasal continuous positive airway pressure, respiratory therapy assess nares and determine need for appliance change or resting period.  7/10/2024 1237 by Krys Chen RN  Outcome: Progressing  7/10/2024 0309 by Eliecer Mack RN  Outcome: Progressing     Problem: Safety - Adult  Goal: Free from fall injury  7/10/2024 1237 by Krys Chen RN  Outcome: Progressing  7/10/2024 0309 by Eliecer Mack RN  Outcome: Progressing     Problem: Chronic Conditions and Co-morbidities  Goal: Patient's chronic conditions and co-morbidity symptoms are monitored and maintained or improved  7/10/2024 1237 by Krys Chen RN  Outcome: Progressing  7/10/2024 0309 by Eliecer Mack RN  Outcome: Progressing     Problem: Pain  Goal: Verbalizes/displays adequate comfort level or baseline comfort level  7/10/2024 1237 by Krys Chen RN  Outcome: Progressing  7/10/2024 0309 by Eliecer Mack RN  Outcome: Not Progressing  Flowsheets (Taken 7/9/2024 1933)  Verbalizes/displays adequate comfort level or baseline comfort level: Encourage patient to monitor pain and request assistance   Care plan reviewed with patient.  Patient verbalizes understanding of the plan of care and contributes to goal setting.

## 2024-07-10 NOTE — FLOWSHEET NOTE
07/10/24 1209   Vital Signs   BP (!) 167/77   Temp 97.3 °F (36.3 °C)   Pulse 93   Respirations 16   SpO2 97 %   Weight - Scale 46.6 kg (102 lb 11.8 oz)   Weight Method Bed scale   Percent Weight Change -2.16   Dry Weight 47.8 kg (105 lb 6.1 oz)   Post-Hemodialysis Assessment   Post-Treatment Procedures Blood returned;Catheter Capped, clamped with Saline x2 ports   Machine Disinfection Process Acid/Vinegar Clean;Exterior Machine Disinfection   Rinseback Volume (ml) 400 ml   Blood Volume Processed (Liters) 56 L   Dialyzer Clearance Lightly streaked   Duration of Treatment (minutes) 150 minutes   Hemodialysis Intake (ml) 400 ml   Hemodialysis Output (ml) 400 ml   NET Removed (ml) 0   Tolerated Treatment Good     2.5hr tx.  No UF @ EDW.  CVC flushed with normal saline and clamped.  Dressing clean, dry and intact.  Report called to Floor nurse.  Treatment record complete and scanned to EMR.

## 2024-07-11 VITALS
HEART RATE: 97 BPM | TEMPERATURE: 97.6 F | OXYGEN SATURATION: 100 % | HEIGHT: 60 IN | WEIGHT: 102.73 LBS | DIASTOLIC BLOOD PRESSURE: 71 MMHG | RESPIRATION RATE: 16 BRPM | SYSTOLIC BLOOD PRESSURE: 134 MMHG | BODY MASS INDEX: 20.17 KG/M2

## 2024-07-11 LAB
ANION GAP SERPL CALC-SCNC: 19 MEQ/L (ref 8–16)
BUN SERPL-MCNC: 34 MG/DL (ref 7–22)
CALCIUM SERPL-MCNC: 9 MG/DL (ref 8.5–10.5)
CHLORIDE SERPL-SCNC: 100 MEQ/L (ref 98–111)
CO2 SERPL-SCNC: 21 MEQ/L (ref 23–33)
CREAT SERPL-MCNC: 4 MG/DL (ref 0.4–1.2)
GFR SERPL CREATININE-BSD FRML MDRD: 11 ML/MIN/1.73M2
GLUCOSE SERPL-MCNC: 97 MG/DL (ref 70–108)
POTASSIUM SERPL-SCNC: 3.9 MEQ/L (ref 3.5–5.2)
SODIUM SERPL-SCNC: 140 MEQ/L (ref 135–145)

## 2024-07-11 PROCEDURE — 99239 HOSP IP/OBS DSCHRG MGMT >30: CPT

## 2024-07-11 PROCEDURE — 97530 THERAPEUTIC ACTIVITIES: CPT

## 2024-07-11 PROCEDURE — 2580000003 HC RX 258

## 2024-07-11 PROCEDURE — 99232 SBSQ HOSP IP/OBS MODERATE 35: CPT | Performed by: INTERNAL MEDICINE

## 2024-07-11 PROCEDURE — 80048 BASIC METABOLIC PNL TOTAL CA: CPT

## 2024-07-11 PROCEDURE — 97166 OT EVAL MOD COMPLEX 45 MIN: CPT

## 2024-07-11 PROCEDURE — 36415 COLL VENOUS BLD VENIPUNCTURE: CPT

## 2024-07-11 PROCEDURE — 97535 SELF CARE MNGMENT TRAINING: CPT

## 2024-07-11 PROCEDURE — 6370000000 HC RX 637 (ALT 250 FOR IP)

## 2024-07-11 RX ORDER — LIDOCAINE 4 G/G
1 PATCH TOPICAL DAILY
Qty: 10 EACH | Refills: 0 | Status: SHIPPED | OUTPATIENT
Start: 2024-07-12

## 2024-07-11 RX ADMIN — AMLODIPINE BESYLATE 10 MG: 10 TABLET ORAL at 09:01

## 2024-07-11 RX ADMIN — SERTRALINE HYDROCHLORIDE 50 MG: 50 TABLET ORAL at 09:01

## 2024-07-11 RX ADMIN — PRIMIDONE 50 MG: 50 TABLET ORAL at 09:01

## 2024-07-11 RX ADMIN — CARBIDOPA AND LEVODOPA 1 TABLET: 10; 100 TABLET ORAL at 05:22

## 2024-07-11 RX ADMIN — CARBIDOPA AND LEVODOPA 1 TABLET: 10; 100 TABLET ORAL at 12:51

## 2024-07-11 RX ADMIN — SODIUM CHLORIDE, PRESERVATIVE FREE 10 ML: 5 INJECTION INTRAVENOUS at 09:00

## 2024-07-11 RX ADMIN — MEGESTROL ACETATE 40 MG: 40 TABLET ORAL at 09:02

## 2024-07-11 RX ADMIN — SEVELAMER CARBONATE 800 MG: 800 TABLET, FILM COATED ORAL at 09:01

## 2024-07-11 RX ADMIN — FOLIC ACID 500 MCG: 1 TABLET ORAL at 09:01

## 2024-07-11 RX ADMIN — LEVETIRACETAM 500 MG: 500 TABLET, FILM COATED ORAL at 09:00

## 2024-07-11 RX ADMIN — CLOPIDOGREL BISULFATE 75 MG: 75 TABLET ORAL at 09:00

## 2024-07-11 RX ADMIN — BUPROPION HYDROCHLORIDE 150 MG: 300 TABLET, EXTENDED RELEASE ORAL at 09:01

## 2024-07-11 RX ADMIN — BUPROPION HYDROCHLORIDE 300 MG: 300 TABLET, EXTENDED RELEASE ORAL at 09:01

## 2024-07-11 RX ADMIN — ACETAMINOPHEN 650 MG: 325 TABLET ORAL at 03:45

## 2024-07-11 ASSESSMENT — PAIN DESCRIPTION - ORIENTATION: ORIENTATION: LOWER;POSTERIOR

## 2024-07-11 ASSESSMENT — PAIN DESCRIPTION - LOCATION: LOCATION: NECK;HEAD

## 2024-07-11 ASSESSMENT — PAIN SCALES - GENERAL
PAINLEVEL_OUTOF10: 9
PAINLEVEL_OUTOF10: 5
PAINLEVEL_OUTOF10: 0

## 2024-07-11 ASSESSMENT — PAIN - FUNCTIONAL ASSESSMENT: PAIN_FUNCTIONAL_ASSESSMENT: PREVENTS OR INTERFERES SOME ACTIVE ACTIVITIES AND ADLS

## 2024-07-11 ASSESSMENT — PAIN DESCRIPTION - DESCRIPTORS: DESCRIPTORS: ACHING

## 2024-07-11 NOTE — DISCHARGE SUMMARY
Hospitalist Discharge Summary    Patient: Deloris Watts  YOB: 1955  MRN: [id number]   Acct: [id number]    Primary Care Physician: Johana Weldon MD    Admit date  7/8/2024    Discharge date:      Discharge Assessment and Plan:    Headache, improved  Reports ongoing headache for past 2 days prior to admission.  CT head and CTA head and neck which shows no significant findings. MRI brain without acute infarct.   Trial droperidol q6 hrs and decadron 10 mg once.   Continue tylenol as needed and lidocaine patches    ESRD on HD  Follows with Dr. Cuadra.  [facility] schedule.  Continue Renvela  Nephrology consulted and following    History of small left subdural hematoma  Occurred 5/25/2024 due to mechanical fall.  Neurosurgery consulted at time patient received TXA.  No surgical intervention was performed.  CT head on admission showed stable CT scan of brain no interval change in 6/18, no hemorrhage  Continue Keppra for seizure prophylaxis     Encephalopathy: Returned to baseline per patient's  at bedside.  CT head, CTA head and neck with no significant findings  MRI brain negative for acute findings  Initially,  reported concern for worsening confusion and hallucinations.  UA not indicative of infection    History of ischemic CVA: Chronic left-sided weakness, continue home Plavix    Parkinson's disease: Continue Sinemet and primidone    Essential HTN: Continue home amlodipine    Anxiety/depression: Continue Zoloft and Wellbutrin      Chief Complaint on presentation: Headache     Initial H&P / Hospital Course:   Per H&P 7/8 \"Deloris Watts is a 68 y.o. female with PMHx of ESRD on HD, hypertension, Parkinson disease, anxiety depression, Hx of small subdural hematoma, Hx of ischemic CVA who presents to [facility] with headache.  Patient reports that she has had headache for the past 2 days which is progressively worsened provide she decided to present to the ED.  Patient    minor errors which are inherent in voice recognition technology.**      Electronically signed by Dr. Micheline Bear          Consults:   IP CONSULT TO NEPHROLOGY  IP CONSULT TO SOCIAL WORK    Discharge Medications:      Medication List        START taking these medications      lidocaine 4 % external patch  Place 1 patch onto the skin daily  Start taking on: July 12, 2024            CHANGE how you take these medications      traZODone 100 MG tablet  Commonly known as: DESYREL  Take 1 tablet by mouth nightly For sleep  What changed: Another medication with the same name was removed. Continue taking this medication, and follow the directions you see here.            CONTINUE taking these medications      amLODIPine 10 MG tablet  Commonly known as: NORVASC  Take 1 tablet by mouth daily     bisacodyl 10 MG suppository  Commonly known as: DULCOLAX  Place 1 suppository rectally daily as needed for Constipation     blood glucose test strips  Test 1-2x/day.     * buPROPion 150 MG extended release tablet  Commonly known as: Wellbutrin XL  Take 1 tablet by mouth every morning     * buPROPion 300 MG extended release tablet  Commonly known as: WELLBUTRIN XL  Take 1 tablet by mouth daily     carbidopa-levodopa  MG per tablet  Commonly known as: SINEMET  Take 1 tablet by mouth 3 times daily At 7 am, 12 noon and 5 pm     clopidogrel 75 MG tablet  Commonly known as: PLAVIX  Take 1 tablet by mouth daily     docusate 100 MG Caps  Commonly known as: COLACE, DULCOLAX  Take 100 mg by mouth 2 times daily ; hold if diarrhea     fluticasone 50 MCG/ACT nasal spray  Commonly known as: FLONASE  2 sprays by Each Nostril route daily     folic acid 400 MCG tablet  Commonly known as: FOLVITE     * glucose monitoring kit  1 kit by Does not apply route daily     * glucose monitoring kit  1 kit by Does not apply route daily     Handicap Placard Misc  by Does not apply route Expires in 5 years.  Dx:     Lancets Misc  1 each by Does not apply

## 2024-07-11 NOTE — PROGRESS NOTES
[facility]   Nephrology progress note  7/11/2024, 10:50 AM      Pt Name:    Deloris Watts  MRN:     [id number]     YOB: 1955  Admit Date:    7/8/2024  8:33 AM    Chief Complaint: Nephrology following for ESRD on hemodialysis.    Subjective:  Patient seen and examined  No chest pain or shortness of breath  Feels okay but awake and alert   at bedside  Mental status looks well    Objective:  24HR INTAKE/OUTPUT:    Intake/Output Summary (Last 24 hours) at 7/11/2024 1050  Last data filed at 7/11/2024 0900  Gross per 24 hour   Intake 1045 ml   Output 400 ml   Net 645 ml        Admission weight: 45.8 kg (101 lb)  Wt Readings from Last 3 Encounters:   07/10/24 46.6 kg (102 lb 11.8 oz)   06/26/24 47.2 kg (104 lb)   06/19/24 46.4 kg (102 lb 6.4 oz)        Vitals :   Vitals:    07/10/24 2042 07/11/24 0030 07/11/24 0330 07/11/24 0815   BP: (!) 141/69 (!) 150/76 136/75 (!) 151/69   Pulse: 100 95 97 99   Resp: 18 18 18 16   Temp:  98 °F (36.7 °C) 98.2 °F (36.8 °C) 97.9 °F (36.6 °C)   TempSrc:  Oral Oral Oral   SpO2: 100% 100% 97% 96%   Weight:       Height:           Physical examination  General Appearance:  Well developed. No distress  Mouth/Throat:  Oral mucosa moist  Neck:  Supple, no JVD  Lungs:  Breath sounds: clear  Heart::  S1,S2 heard  Abdomen:  Soft, non - tender  Musculoskeletal:  Edema -no edema noted    Medications:  Infusion:    sodium chloride       Meds:    lidocaine  1 patch TransDERmal Daily    sodium chloride flush  5-40 mL IntraVENous 2 times per day    carbidopa-levodopa  1 tablet Oral TID    clopidogrel  75 mg Oral Daily    folic acid  500 mcg Oral Daily    levETIRAcetam  500 mg Oral Daily    megestrol  40 mg Oral Daily    primidone  50 mg Oral Daily    sertraline  50 mg Oral Daily    sevelamer  800 mg Oral BID with meals    amLODIPine  10 mg Oral Daily    buPROPion  150 mg Oral QAM    buPROPion  300 mg Oral Daily       Lab Data :  CBC:   Recent Labs

## 2024-07-11 NOTE — DISCHARGE INSTRUCTIONS
You were admitted to the hospital due to severe headache. A CT scan of your head did not demonstrate any changes from your prior admission. An MRI brain was obtained and did not demonstrate any evidence of a stroke.    Your headache improved, though you still have occurrences intermittently. Take tylenol as needed. Lidocaine patches were prescribed as needed for any discomfort in your neck. Tension within your neck can contribute to headaches.    Please follow up with your primary care provider after discharge.

## 2024-07-11 NOTE — PLAN OF CARE
Problem: Discharge Planning  Goal: Discharge to home or other facility with appropriate resources  7/11/2024 0122 by Eliecer Mack RN  Outcome: Progressing  Flowsheets (Taken 7/10/2024 1920)  Discharge to home or other facility with appropriate resources: Identify barriers to discharge with patient and caregiver  7/10/2024 1237 by Krys Chen RN  Outcome: Progressing     Problem: Skin/Tissue Integrity  Goal: Absence of new skin breakdown  Description: 1.  Monitor for areas of redness and/or skin breakdown  2.  Assess vascular access sites hourly  3.  Every 4-6 hours minimum:  Change oxygen saturation probe site  4.  Every 4-6 hours:  If on nasal continuous positive airway pressure, respiratory therapy assess nares and determine need for appliance change or resting period.  7/11/2024 0122 by Eliecer Mack RN  Outcome: Progressing  7/10/2024 1237 by Krys Chen RN  Outcome: Progressing     Problem: Safety - Adult  Goal: Free from fall injury  7/11/2024 0122 by Eliecer Mack RN  Outcome: Progressing  7/10/2024 1237 by Krys Chen RN  Outcome: Progressing     Problem: Chronic Conditions and Co-morbidities  Goal: Patient's chronic conditions and co-morbidity symptoms are monitored and maintained or improved  7/11/2024 0122 by Eliecer Mack RN  Outcome: Progressing  Flowsheets (Taken 7/10/2024 1920)  Care Plan - Patient's Chronic Conditions and Co-Morbidity Symptoms are Monitored and Maintained or Improved: Monitor and assess patient's chronic conditions and comorbid symptoms for stability, deterioration, or improvement  7/10/2024 1237 by Krys Chen RN  Outcome: Progressing     Problem: Pain  Goal: Verbalizes/displays adequate comfort level or baseline comfort level  7/11/2024 0122 by Eliecer Mack RN  Outcome: Progressing  Flowsheets (Taken 7/10/2024 1915)  Verbalizes/displays adequate comfort level or baseline comfort level: Encourage patient to monitor pain and request assistance  7/10/2024 1237 by

## 2024-07-11 NOTE — CARE COORDINATION
7/11/24, 1:51 PM EDT    Patient goals/plan/ treatment preferences discussed by  and .  Patient goals/plan/ treatment preferences reviewed with patient/ family.  Patient/ family verbalize understanding of discharge plan and are in agreement with goal/plan/treatment preferences.  Understanding was demonstrated using the teach back method.  AVS provided by RN at time of discharge, which includes all necessary medical information pertaining to the patients current course of illness, treatment, post-discharge goals of care, and treatment preferences.     Services At/After Discharge: Home Health, Aide services, Nursing service, OT, and PT    Pt is being discharged home today with her spouse and current services with [facility].  SW notified Lexis at [facility].  RN is aware

## 2024-07-11 NOTE — PROGRESS NOTES
[facility]  INPATIENT OCCUPATIONAL THERAPY  STRZ RENAL TELEMETRY 6K  EVALUATION    Time:   Time In: 932  Time Out: 954  Timed Code Treatment Minutes: 12 Minutes  Minutes: 22          Date: 2024  Patient Name: Deloris Watts,   Gender: female      MRN: [id number]  : 1955  (68 y.o.)  Referring Practitioner: Zakiya Masterson PA-C  Diagnosis: encephalopathy  Additional Pertinent Hx: per chart review; Deloris Watts is a 68 y.o. female with PMHx of ESRD on HD, hypertension, Parkinson disease, anxiety depression, Hx of small subdural hematoma, Hx of ischemic CVA who presents to [facility] with headache.  Patient reports that she has had headache for the past 2 days which is progressively worsened provide she decided to present to the ED.  Patient examined while on HD, patient states that headache is improved after medications given in the ED.  Patient states headache is still present.  Patient alert to self and location.  Patient states she has not previously had a headache like this before.  Per ED report, patient's  stated the patient was not at her normal baseline mentation, reported concern of significant worsening confusion and some visual type hallucinations.  However, patient was evaluated by Dr. Cuadra, her nephrologist, who states that she is at her baseline mental status.  Unable to speak to  about his concerns regarding patient.  Denies any chest pain, SOB, N/V/C/D, diaphoresis.  Patient states she feels as if she was in her normal state of health except for headache.    Restrictions/Precautions:  Restrictions/Precautions: General Precautions, Fall Risk  Position Activity Restriction  Other position/activity restrictions: Hx Parkinson's disease, hx CVA with L side deficits, Dialysis MWF    Subjective  Chart Reviewed: Yes, Orders, Progress Notes, History and Physical, Imaging  Patient assessed for rehabilitation services?: Yes  Family / Caregiver Present:  task.   Footwear Management: Stand By Assistance.  To doff/don slipper sock seated in recliner .    IADL:   Not Tested    BALANCE:  Sitting Balance:  Stand By Assistance. < > MOD A due to post lean seated EOB with cues to sit upright and having difficulty following instructions pushing posteriorly more.   Standing Balance: Contact Guard Assistance. With use of 2 w/w for support    BED MOBILITY:  Supine to Sit: Minimal Assistance cues for sequencing and initiating in task.     TRANSFERS:  Sit to Stand:  Minimal Assistance. From slightly elevated EOB and cues for hand placement    FUNCTIONAL MOBILITY:  Assistive Device: Rolling Walker  Assist Level:  Contact Guard Assistance and Minimal Assistance.   Distance:  from EOB to recliner  Cues for lining up with recliner to sit safely.        -[facility] Inpatient Daily Activity Raw Score: 19  AM-PAC Inpatient ADL T-Scale Score : 40.22  ADL Inpatient CMS 0-100% Score: 42.8    Activity Tolerance:  Patient tolerance of  treatment: Good treatment tolerance      Modified Carolyn Scale:  Not Applicable    Assessment:  Assessment: patient demo overall de-conditioning, weakness and variable cognition secondary to encephalopathy impacting patient's ability to complete ADLs and functional transfers as prior. patient would benefit from continued, skilled OT to progress toward PLOF, decrease caregiver burden and increase occupational performance.  Performance deficits / Impairments: Decreased functional mobility , Decreased endurance, Decreased coordination, Decreased ADL status, Decreased balance, Decreased strength, Decreased safe awareness, Decreased cognition  Prognosis: Good  REQUIRES OT FOLLOW-UP: Yes  Decision Making: Medium Complexity    Treatment Initiated: Treatment and education initiated within context of evaluation.  Evaluation time included review of current medical information, gathering information related to past medical, social and functional history, completion of

## 2024-07-11 NOTE — PLAN OF CARE
Problem: Discharge Planning  Goal: Discharge to home or other facility with appropriate resources  7/11/2024 1049 by Korin Dunham, RN  Outcome: Progressing  Flowsheets (Taken 7/11/2024 1049)  Discharge to home or other facility with appropriate resources: Identify barriers to discharge with patient and caregiver  Note: Patient preference to discharge home with home health services     Problem: Skin/Tissue Integrity  Goal: Absence of new skin breakdown  Description: 1.  Monitor for areas of redness and/or skin breakdown  2.  Assess vascular access sites hourly  3.  Every 4-6 hours minimum:  Change oxygen saturation probe site  4.  Every 4-6 hours:  If on nasal continuous positive airway pressure, respiratory therapy assess nares and determine need for appliance change or resting period.  7/11/2024 1049 by Korin Dunham, RN  Outcome: Progressing  Note: No new signs or symptoms of skin breakdown noted this shift, encouraging patient to turn and reposition self in bed q2h       Problem: Safety - Adult  Goal: Free from fall injury  7/11/2024 1049 by Korin Dunham RN  Outcome: Progressing  Flowsheets (Taken 7/11/2024 1049)  Free From Fall Injury: Instruct family/caregiver on patient safety  Note: No falls noted this shift. Continue falling star program. Bed alarm on, bed in low position. Call light and personal belongings in reach.  Patient uses call light appropriately.       Problem: Chronic Conditions and Co-morbidities  Goal: Patient's chronic conditions and co-morbidity symptoms are monitored and maintained or improved  7/11/2024 1049 by Korin Dunham, RN  Outcome: Progressing  Flowsheets (Taken 7/11/2024 1049)  Care Plan - Patient's Chronic Conditions and Co-Morbidity Symptoms are Monitored and Maintained or Improved:   Collaborate with multidisciplinary team to address chronic and comorbid conditions and prevent exacerbation or deterioration   Monitor and assess patient's chronic

## 2024-07-11 NOTE — CARE COORDINATION
7/11/24, 11:50 AM EDT    Met with Deloris, she plans to return home with her  at discharge. She denies all needs. Current [facility] and HD MWF at [facility].    Patient goals/plan/ treatment preferences discussed by  and .  Patient goals/plan/ treatment preferences reviewed with patient/ family.  Patient/ family verbalize understanding of discharge plan and are in agreement with goal/plan/treatment preferences.  Understanding was demonstrated using the teach back method.  AVS provided by RN at time of discharge, which includes all necessary medical information pertaining to the patients current course of illness, treatment, post-discharge goals of care, and treatment preferences.     Services At/After Discharge: None

## 2024-07-11 NOTE — PROGRESS NOTES
[facility]  INPATIENT PHYSICAL THERAPY  DAILY NOTE  STRZ RENAL TELEMETRY 6K - 6K-02/002-A    Time In: 1157  Time Out: 1215  Timed Code Treatment Minutes: 18 Minutes  Minutes: 18          Date: 2024  Patient Name: Deloris Watts,  Gender:  female        MRN: [id number]  : 1955  (68 y.o.)     Referring Practitioner: Zakiya Mastreson PA-C  Diagnosis: Encephalopathy  Additional Pertinent Hx: Per HPI, \"Deloris Watts is a 68 y.o. female with PMHx of ESRD on HD, hypertension, Parkinson disease, anxiety depression, Hx of small subdural hematoma, Hx of ischemic CVA who presents to [facility] with headache.  Patient reports that she has had headache for the past 2 days which is progressively worsened provide she decided to present to the ED.  Patient examined while on HD, patient states that headache is improved after medications given in the ED.  Patient states headache is still present.  Patient alert to self and location.  Patient states she has not previously had a headache like this before.  Per ED report, patient's  stated the patient was not at her normal baseline mentation, reported concern of significant worsening confusion and some visual type hallucinations.  However, patient was evaluated by Dr. Cuadra, her nephrologist, who states that she is at her baseline mental status.  Unable to speak to  about his concerns regarding patient.  Denies any chest pain, SOB, N/V/C/D, diaphoresis.  Patient states she feels as if she was in her normal state of health except for headache.\"     Prior Level of Function:  Lives With: Spouse  Type of Home: House  Home Layout: One level  Home Access: Stairs to enter with rails  Entrance Stairs - Number of Steps: 1 step. Pt has a ramp in the garage to enter the house.  pt's spouse uses a transport chair to assist patient in/out of house  Home Equipment: Lift chair, Reacher, Walker - Rolling, Wheelchair - Manual   Bathroom

## 2024-07-12 ENCOUNTER — COMMUNITY CARE MANAGEMENT (OUTPATIENT)
Facility: CLINIC | Age: 69
End: 2024-07-12

## 2024-07-12 NOTE — PROGRESS NOTES
Patient name and  and address verified, advised recorded line.   Patient states that she is having kidney problems, states that she is urinating \"all day and all night.\" Patient states that she in unsure of what dialysis clinic she is at. States that she will discuss her urinary frequency with her nurse at dialysis. TCM will follow up with patient when she is with her  as patient is having difficulty following the conversation and answering questions appropriately.    Chris, FLY [facility] TCM

## 2024-07-25 ENCOUNTER — HOSPITAL ENCOUNTER (INPATIENT)
Age: 69
LOS: 5 days | Discharge: NURSING, REHAB OR LONG-TERM CARE | DRG: 521 | End: 2024-07-30
Attending: EMERGENCY MEDICINE | Admitting: INTERNAL MEDICINE
Payer: MEDICARE

## 2024-07-25 ENCOUNTER — APPOINTMENT (OUTPATIENT)
Dept: GENERAL RADIOLOGY | Age: 69
DRG: 521 | End: 2024-07-25
Payer: MEDICARE

## 2024-07-25 DIAGNOSIS — S72.001A CLOSED DISPLACED FRACTURE OF RIGHT FEMORAL NECK (HCC): Primary | ICD-10-CM
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
## 2024-07-25 LAB
ALBUMIN SERPL BCG-MCNC: 4 G/DL (ref 3.5–5.1)
ALP SERPL-CCNC: 116 U/L (ref 38–126)
ALT SERPL W/O P-5'-P-CCNC: < 5 U/L (ref 11–66)
AMORPH SED URNS QL MICRO: ABNORMAL
ANION GAP SERPL CALC-SCNC: 15 MEQ/L (ref 8–16)
AST SERPL-CCNC: 19 U/L (ref 5–40)
BACTERIA: ABNORMAL
BASOPHILS ABSOLUTE: 0.1 THOU/MM3 (ref 0–0.1)
BASOPHILS NFR BLD AUTO: 1.1 %
BILIRUB SERPL-MCNC: 0.4 MG/DL (ref 0.3–1.2)
BILIRUB UR QL STRIP: NEGATIVE
BUN SERPL-MCNC: 34 MG/DL (ref 7–22)
CALCIUM SERPL-MCNC: 9 MG/DL (ref 8.5–10.5)
CASTS #/AREA URNS LPF: ABNORMAL /LPF
CASTS #/AREA URNS LPF: ABNORMAL /LPF
CHARACTER UR: ABNORMAL
CHARCOAL URNS QL MICRO: ABNORMAL
CHLORIDE SERPL-SCNC: 98 MEQ/L (ref 98–111)
CO2 SERPL-SCNC: 25 MEQ/L (ref 23–33)
COLOR UR: YELLOW
CREAT SERPL-MCNC: 4.4 MG/DL (ref 0.4–1.2)
CRYSTALS URNS QL MICRO: ABNORMAL
DEPRECATED RDW RBC AUTO: 61.9 FL (ref 35–45)
EOSINOPHIL NFR BLD AUTO: 1.5 %
EOSINOPHILS ABSOLUTE: 0.1 THOU/MM3 (ref 0–0.4)
EPITHELIAL CELLS, UA: ABNORMAL /HPF
ERYTHROCYTE [DISTWIDTH] IN BLOOD BY AUTOMATED COUNT: 16.6 % (ref 11.5–14.5)
GFR SERPL CREATININE-BSD FRML MDRD: 10 ML/MIN/1.73M2
GLUCOSE SERPL-MCNC: 80 MG/DL (ref 70–108)
GLUCOSE UR QL STRIP.AUTO: 100 MG/DL
HCT VFR BLD AUTO: 47.6 % (ref 37–47)
HGB BLD-MCNC: 14.5 GM/DL (ref 12–16)
HGB UR QL STRIP.AUTO: NEGATIVE
IMM GRANULOCYTES # BLD AUTO: 0.1 THOU/MM3 (ref 0–0.07)
IMM GRANULOCYTES NFR BLD AUTO: 1.1 %
KETONES UR QL STRIP.AUTO: ABNORMAL
LEUKOCYTE ESTERASE UR QL STRIP.AUTO: NEGATIVE
LYMPHOCYTES ABSOLUTE: 1 THOU/MM3 (ref 1–4.8)
LYMPHOCYTES NFR BLD AUTO: 10.8 %
MCH RBC QN AUTO: 31 PG (ref 26–33)
MCHC RBC AUTO-ENTMCNC: 30.5 GM/DL (ref 32.2–35.5)
MCV RBC AUTO: 101.7 FL (ref 81–99)
MONOCYTES ABSOLUTE: 0.7 THOU/MM3 (ref 0.4–1.3)
MONOCYTES NFR BLD AUTO: 7.1 %
NEUTROPHILS ABSOLUTE: 7.2 THOU/MM3 (ref 1.8–7.7)
NEUTROPHILS NFR BLD AUTO: 78.4 %
NITRITE UR QL STRIP.AUTO: NEGATIVE
NRBC BLD AUTO-RTO: 0 /100 WBC
OSMOLALITY SERPL CALC.SUM OF ELEC: 282.3 MOSMOL/KG (ref 275–300)
PH UR STRIP.AUTO: >= 9 [PH] (ref 5–9)
PLATELET # BLD AUTO: 234 THOU/MM3 (ref 130–400)
PMV BLD AUTO: 11.4 FL (ref 9.4–12.4)
POTASSIUM SERPL-SCNC: 4.2 MEQ/L (ref 3.5–5.2)
PROT SERPL-MCNC: 6.6 G/DL (ref 6.1–8)
PROT UR STRIP.AUTO-MCNC: 100 MG/DL
RBC # BLD AUTO: 4.68 MILL/MM3 (ref 4.2–5.4)
RBC #/AREA URNS HPF: ABNORMAL /HPF
RENAL EPI CELLS #/AREA URNS HPF: ABNORMAL /[HPF]
SODIUM SERPL-SCNC: 138 MEQ/L (ref 135–145)
SPECIFIC GRAVITY UA: 1.01 (ref 1–1.03)
UROBILINOGEN, URINE: 0.2 EU/DL (ref 0–1)
WBC # BLD AUTO: 9.2 THOU/MM3 (ref 4.8–10.8)
WBC #/AREA URNS HPF: ABNORMAL /HPF
YEAST LIKE FUNGI URNS QL MICRO: ABNORMAL

## 2024-07-25 PROCEDURE — 99285 EMERGENCY DEPT VISIT HI MDM: CPT

## 2024-07-25 PROCEDURE — 6360000002 HC RX W HCPCS

## 2024-07-25 PROCEDURE — 36415 COLL VENOUS BLD VENIPUNCTURE: CPT

## 2024-07-25 PROCEDURE — 6370000000 HC RX 637 (ALT 250 FOR IP)

## 2024-07-25 PROCEDURE — 73502 X-RAY EXAM HIP UNI 2-3 VIEWS: CPT

## 2024-07-25 PROCEDURE — 96374 THER/PROPH/DIAG INJ IV PUSH: CPT

## 2024-07-25 PROCEDURE — 1200000003 HC TELEMETRY R&B

## 2024-07-25 PROCEDURE — 96375 TX/PRO/DX INJ NEW DRUG ADDON: CPT

## 2024-07-25 PROCEDURE — 2580000003 HC RX 258

## 2024-07-25 PROCEDURE — 85025 COMPLETE CBC W/AUTO DIFF WBC: CPT

## 2024-07-25 PROCEDURE — 99223 1ST HOSP IP/OBS HIGH 75: CPT | Performed by: INTERNAL MEDICINE

## 2024-07-25 PROCEDURE — 93005 ELECTROCARDIOGRAM TRACING: CPT

## 2024-07-25 PROCEDURE — 6820000001 HC L2 TRAUMA SURGERY EVALUATION: Performed by: ORTHOPAEDIC SURGERY

## 2024-07-25 PROCEDURE — 81001 URINALYSIS AUTO W/SCOPE: CPT

## 2024-07-25 PROCEDURE — 80053 COMPREHEN METABOLIC PANEL: CPT

## 2024-07-25 RX ORDER — BUPROPION HYDROCHLORIDE 150 MG/1
150 TABLET ORAL EVERY MORNING
Status: DISCONTINUED | OUTPATIENT
Start: 2024-07-26 | End: 2024-07-30 | Stop reason: HOSPADM

## 2024-07-25 RX ORDER — OXYCODONE HYDROCHLORIDE AND ACETAMINOPHEN 5; 325 MG/1; MG/1
1 TABLET ORAL EVERY 4 HOURS PRN
Status: DISCONTINUED | OUTPATIENT
Start: 2024-07-25 | End: 2024-07-30 | Stop reason: HOSPADM

## 2024-07-25 RX ORDER — TRAZODONE HYDROCHLORIDE 100 MG/1
100 TABLET ORAL NIGHTLY
Status: DISCONTINUED | OUTPATIENT
Start: 2024-07-25 | End: 2024-07-30 | Stop reason: HOSPADM

## 2024-07-25 RX ORDER — CLOPIDOGREL BISULFATE 75 MG/1
75 TABLET ORAL DAILY
Status: DISCONTINUED | OUTPATIENT
Start: 2024-07-26 | End: 2024-07-26

## 2024-07-25 RX ORDER — SEVELAMER CARBONATE 800 MG/1
800 TABLET, FILM COATED ORAL 2 TIMES DAILY WITH MEALS
Status: DISCONTINUED | OUTPATIENT
Start: 2024-07-25 | End: 2024-07-30 | Stop reason: HOSPADM

## 2024-07-25 RX ORDER — ASPIRIN 81 MG/1
81 TABLET ORAL DAILY
Status: ON HOLD | COMMUNITY

## 2024-07-25 RX ORDER — FENTANYL CITRATE 50 UG/ML
50 INJECTION, SOLUTION INTRAMUSCULAR; INTRAVENOUS ONCE
Status: COMPLETED | OUTPATIENT
Start: 2024-07-25 | End: 2024-07-25

## 2024-07-25 RX ORDER — SODIUM CHLORIDE 9 MG/ML
INJECTION, SOLUTION INTRAVENOUS PRN
Status: DISCONTINUED | OUTPATIENT
Start: 2024-07-25 | End: 2024-07-30 | Stop reason: HOSPADM

## 2024-07-25 RX ORDER — AMLODIPINE BESYLATE 10 MG/1
10 TABLET ORAL DAILY
Status: DISCONTINUED | OUTPATIENT
Start: 2024-07-26 | End: 2024-07-30 | Stop reason: HOSPADM

## 2024-07-25 RX ORDER — LACTULOSE 10 G/15ML
20 SOLUTION ORAL PRN
Status: ON HOLD | COMMUNITY

## 2024-07-25 RX ORDER — PRIMIDONE 50 MG/1
50 TABLET ORAL DAILY
Status: DISCONTINUED | OUTPATIENT
Start: 2024-07-25 | End: 2024-07-30 | Stop reason: HOSPADM

## 2024-07-25 RX ORDER — FLUTICASONE PROPIONATE 50 MCG
2 SPRAY, SUSPENSION (ML) NASAL DAILY
Status: DISCONTINUED | OUTPATIENT
Start: 2024-07-25 | End: 2024-07-30 | Stop reason: HOSPADM

## 2024-07-25 RX ORDER — TRIAMCINOLONE ACETONIDE 55 UG/1
2 SPRAY, METERED NASAL DAILY
Status: ON HOLD | COMMUNITY

## 2024-07-25 RX ORDER — ONDANSETRON 2 MG/ML
4 INJECTION INTRAMUSCULAR; INTRAVENOUS ONCE
Status: COMPLETED | OUTPATIENT
Start: 2024-07-25 | End: 2024-07-25

## 2024-07-25 RX ORDER — POLYETHYLENE GLYCOL 3350 17 G/17G
17 POWDER, FOR SOLUTION ORAL DAILY PRN
Status: DISCONTINUED | OUTPATIENT
Start: 2024-07-25 | End: 2024-07-30 | Stop reason: HOSPADM

## 2024-07-25 RX ORDER — MEGESTROL ACETATE 40 MG/1
40 TABLET ORAL DAILY
Status: DISCONTINUED | OUTPATIENT
Start: 2024-07-25 | End: 2024-07-30 | Stop reason: HOSPADM

## 2024-07-25 RX ORDER — SENNOSIDES A AND B 8.6 MG/1
1 TABLET, FILM COATED ORAL NIGHTLY
Status: DISCONTINUED | OUTPATIENT
Start: 2024-07-25 | End: 2024-07-30 | Stop reason: HOSPADM

## 2024-07-25 RX ORDER — DOCUSATE SODIUM 100 MG/1
100 CAPSULE, LIQUID FILLED ORAL 2 TIMES DAILY
Status: DISCONTINUED | OUTPATIENT
Start: 2024-07-25 | End: 2024-07-30 | Stop reason: HOSPADM

## 2024-07-25 RX ORDER — ACETAMINOPHEN 650 MG/1
650 SUPPOSITORY RECTAL EVERY 6 HOURS PRN
Status: DISCONTINUED | OUTPATIENT
Start: 2024-07-25 | End: 2024-07-30 | Stop reason: HOSPADM

## 2024-07-25 RX ORDER — ONDANSETRON 4 MG/1
4 TABLET, ORALLY DISINTEGRATING ORAL EVERY 8 HOURS PRN
Status: DISCONTINUED | OUTPATIENT
Start: 2024-07-25 | End: 2024-07-30 | Stop reason: HOSPADM

## 2024-07-25 RX ORDER — BUPROPION HYDROCHLORIDE 300 MG/1
300 TABLET ORAL DAILY
Status: DISCONTINUED | OUTPATIENT
Start: 2024-07-25 | End: 2024-07-30 | Stop reason: HOSPADM

## 2024-07-25 RX ORDER — VITAMIN B COMPLEX
1 CAPSULE ORAL DAILY
Status: ON HOLD | COMMUNITY

## 2024-07-25 RX ORDER — OXYCODONE HYDROCHLORIDE AND ACETAMINOPHEN 5; 325 MG/1; MG/1
2 TABLET ORAL EVERY 4 HOURS PRN
Status: DISCONTINUED | OUTPATIENT
Start: 2024-07-25 | End: 2024-07-30 | Stop reason: HOSPADM

## 2024-07-25 RX ORDER — CETIRIZINE HYDROCHLORIDE 5 MG/1
5 TABLET ORAL DAILY
Status: ON HOLD | COMMUNITY

## 2024-07-25 RX ORDER — SODIUM CHLORIDE 0.9 % (FLUSH) 0.9 %
5-40 SYRINGE (ML) INJECTION EVERY 12 HOURS SCHEDULED
Status: DISCONTINUED | OUTPATIENT
Start: 2024-07-25 | End: 2024-07-30 | Stop reason: HOSPADM

## 2024-07-25 RX ORDER — FOLIC ACID 1 MG/1
500 TABLET ORAL DAILY
Status: DISCONTINUED | OUTPATIENT
Start: 2024-07-25 | End: 2024-07-30 | Stop reason: HOSPADM

## 2024-07-25 RX ORDER — ONDANSETRON 2 MG/ML
4 INJECTION INTRAMUSCULAR; INTRAVENOUS EVERY 6 HOURS PRN
Status: DISCONTINUED | OUTPATIENT
Start: 2024-07-25 | End: 2024-07-30 | Stop reason: HOSPADM

## 2024-07-25 RX ORDER — SODIUM CHLORIDE 0.9 % (FLUSH) 0.9 %
5-40 SYRINGE (ML) INJECTION PRN
Status: DISCONTINUED | OUTPATIENT
Start: 2024-07-25 | End: 2024-07-30 | Stop reason: HOSPADM

## 2024-07-25 RX ORDER — ACETAMINOPHEN 325 MG/1
650 TABLET ORAL EVERY 6 HOURS PRN
Status: DISCONTINUED | OUTPATIENT
Start: 2024-07-25 | End: 2024-07-30 | Stop reason: HOSPADM

## 2024-07-25 RX ADMIN — SEVELAMER CARBONATE 800 MG: 800 TABLET, FILM COATED ORAL at 23:11

## 2024-07-25 RX ADMIN — FLUTICASONE PROPIONATE 2 SPRAY: 50 SPRAY, METERED NASAL at 23:11

## 2024-07-25 RX ADMIN — FENTANYL CITRATE 50 MCG: 50 INJECTION, SOLUTION INTRAMUSCULAR; INTRAVENOUS at 16:17

## 2024-07-25 RX ADMIN — PRIMIDONE 50 MG: 50 TABLET ORAL at 23:11

## 2024-07-25 RX ADMIN — TRAZODONE HYDROCHLORIDE 100 MG: 100 TABLET ORAL at 23:11

## 2024-07-25 RX ADMIN — HYDROMORPHONE HYDROCHLORIDE 1 MG: 1 INJECTION, SOLUTION INTRAMUSCULAR; INTRAVENOUS; SUBCUTANEOUS at 17:20

## 2024-07-25 RX ADMIN — FOLIC ACID 500 MCG: 1 TABLET ORAL at 23:11

## 2024-07-25 RX ADMIN — SODIUM CHLORIDE, PRESERVATIVE FREE 10 ML: 5 INJECTION INTRAVENOUS at 23:13

## 2024-07-25 RX ADMIN — SERTRALINE 50 MG: 50 TABLET, FILM COATED ORAL at 23:11

## 2024-07-25 RX ADMIN — ONDANSETRON 4 MG: 2 INJECTION INTRAMUSCULAR; INTRAVENOUS at 16:17

## 2024-07-25 RX ADMIN — OXYCODONE HYDROCHLORIDE AND ACETAMINOPHEN 2 TABLET: 5; 325 TABLET ORAL at 19:40

## 2024-07-25 ASSESSMENT — PAIN - FUNCTIONAL ASSESSMENT
PAIN_FUNCTIONAL_ASSESSMENT: 0-10

## 2024-07-25 ASSESSMENT — PAIN SCALES - GENERAL
PAINLEVEL_OUTOF10: 9
PAINLEVEL_OUTOF10: 8
PAINLEVEL_OUTOF10: 8
PAINLEVEL_OUTOF10: 9

## 2024-07-25 ASSESSMENT — PAIN DESCRIPTION - LOCATION: LOCATION: HIP

## 2024-07-25 ASSESSMENT — PAIN DESCRIPTION - ORIENTATION: ORIENTATION: RIGHT

## 2024-07-25 NOTE — ED NOTES
ED to inpatient nurses report      Chief Complaint:  Chief Complaint   Patient presents with    Hip Pain    Fall     Present to ED from: home    MOA:     LOC: alert and orientated to name and place  Mobility: Requires assistance * 2  Oxygen Baseline: RA    Current needs required: RA     Code Status:   Prior    What abnormal results were found and what did you give/do to treat them? none  Any procedures or intervention occur? orthopedics    Mental Status:  Level of Consciousness: Alert (0)    Psych Assessment:        Vitals:  Patient Vitals for the past 24 hrs:   BP Temp Temp src Pulse Resp SpO2 Height Weight   07/25/24 1741 (!) 146/72 -- -- 94 18 91 % -- --   07/25/24 1602 -- 97.9 °F (36.6 °C) Oral -- -- -- -- --   07/25/24 1559 130/75 -- -- 82 16 93 % 1.524 m (5') 46.7 kg (103 lb)        LDAs:   Peripheral IV 07/25/24 Left;Anterior Forearm (Active)   Site Assessment Clean, dry & intact 07/25/24 1605   Line Status Normal saline locked 07/25/24 1605   Line Care Connections checked and tightened 07/25/24 1605   Phlebitis Assessment No symptoms 07/25/24 1605   Infiltration Assessment 0 07/25/24 1605   Dressing Status Clean, dry & intact 07/25/24 1605   Dressing Type Transparent 07/25/24 1605       Ambulatory Status:  No data recorded    Diagnosis:  DISPOSITION Admitted 07/25/2024 05:49:01 PM   Final diagnoses:   Fall, initial encounter   Closed displaced fracture of right femoral neck (HCC)        Consults:  IP CONSULT TO CASE MANAGEMENT  IP CONSULT TO ORTHOPEDIC SURGERY     Pain Score:  Pain Assessment  Pain Assessment: 0-10  Pain Level: 9  Pain Location: Hip  Pain Orientation: Right    C-SSRS:   Risk of Suicide: No Risk    Sepsis Screening:       [place] Fall Risk:       Swallow Screening        Preferred Language:   English      ALLERGIES     Pioglitazone, Amoxicillin, Amoxicillin-pot clavulanate, Other, Ciprofloxacin, and Sulfa antibiotics    SURGICAL HISTORY       Past Surgical History:   Procedure Laterality Date  by Moisés Haynes RN on 7/25/2024 at 5:50 PM

## 2024-07-25 NOTE — CARE COORDINATION
FLY German advised that spouse wanted to speak with CM. Spouse advised he filled for Medicaid about 2 weeks ago because he is no longer able to care for patient at home and is going to have to place her long term in ECF. Patient has been at [facility] in the past for outpatient rehab and that is spouses choice for initial rehab with long term placement after.

## 2024-07-25 NOTE — H&P
History & Physical  Internal Medicine Resident     Patient: Deloris Watts 68 y.o. female      : 1955  Date of Admission: 2024  Date of Service: Pt seen/examined on 24 and Admitted to Inpatient with expected LOS greater than two midnights due to medical therapy.     ASSESSMENT AND PLAN  Right femoral neck fracture: Fall, 2024.  Patient was on Plavix, held at this time.  X-ray hip pelvis showed acute fracture right hip.  Orthopedics was consulted by ED.  Pain meds started her emergency department.  - Orthopedics following, plan for possible surgical intervention on 2024  - Plavix held.  N.p.o. at midnight  - Oral pain medications ordered.  Continue monitor need for IV pain meds.  ESRD: On hemodialysis,  schedule. Follows w/ Dr. Cuadra.   - Nephrology consulted for continuation of HD  - Continuing Renvela  - Continue to monitor electrolytes, daily BMP  Preoperative risk assessment: RSRI - 3, Hx CVA, creatinine greater than 2 and has diastolic CHF.  Patient has METs score >4. Tolerated inpatient rehab without difficulty. Moderate risk patient  Dysuria: UA ordered for further eval  -If UA positive UTI will initiate ABX  Chronic Conditions (reviewed and stable unless otherwise stated)   Debility: Decreased appetite.  On Megace, continued.  PT/OT ordered  Hypertension: On amlodipine, hold parameters.  Parkinson's: Noted.  Continuing Sinemet and primidone.  MDD/GABBY: Continue home medications.  Zoloft and Wellbutrin  Hyperlipidemia: Continue statin.  Hx left parafalcine subdural hematoma: Noted.  Previously on Keppra, DC per neurology  History ischemic CVA: 15+ years ago.  Chronic left-sided weakness.  On Plavix.  --- Plavix held  DVT prophylaxis: SCDs  CODE STATUS: Full code    Data reviewed (unless otherwise discussed in assessment/plan)  EKG:  I have reviewed the EKG with the following interpretation: Normal sinus rhythm with right bundle branch block.  Imaging: I

## 2024-07-25 NOTE — ED NOTES
Patient resting on cot. VS stable. Telemetry in place. Call light in reach. Family at bedside. Patient denies any needs at this time.

## 2024-07-25 NOTE — ED TRIAGE NOTES
Pt presents to ED with right hip pain due to fall. Pt states she tripped over right house slipper and fell. Pt right leg appears shorter than left and rotated outward. Pt rates pain 9/10. Pt denies previous surgeries to right hip. Pt denies hitting head, LOC, but does take blood thinners.

## 2024-07-25 NOTE — ED NOTES
Pt states pain is better at 9/10. RN messaged hospitalist for additional pain medications for when we move patient upstairs.

## 2024-07-25 NOTE — ED PROVIDER NOTES
[facility] EMERGENCY DEPARTMENT  EMERGENCY DEPARTMENT ENCOUNTER          Pt Name: Deloris Watts  MRN: [id number]  Birthdate 1955  Date of evaluation: 7/25/2024  Resident Physician: Viral Smith MD EM Resident PGY-3  Attending Physician: Kenton Curtis DO      CHIEF COMPLAINT       Chief Complaint   Patient presents with    Hip Pain    Fall         HISTORY OF PRESENT ILLNESS    HPI  Deloris Watts is a 68 y.o. female who presents to the emergency department from home, brought in by EMS for evaluation of fall, right hip pain.  Patient had unwitnessed fall at home patient accompanied by  who states that she is taking Plavix and aspirin for previous history of stroke.  Patient endorsing of right hip pain with right leg foreshortened, externally rotated.  Patient endorsing 9/10 severity pain from right hip.  Patient also noted to have dialysis port to right chest  states she last received dialysis yesterday.      The patient has no other acute complaints at this time.    ROS negative except as stated above.    PAST MEDICAL AND SURGICAL HISTORY     Past Medical History:   Diagnosis Date    Allergic rhinitis     Anemia in CKD (chronic kidney disease)     Anxiety     CHF (congestive heart failure) (HCC)     Closed fracture of right proximal humerus 09/18/2021    ORIF    Closed right ankle fracture     In 1990s; treated with casting    CVA (cerebral infarction)     in 1990s ; with left-sided weakness    Depression     Fibromyalgia     in 1990s    Headache(784.0)     Hemiplegia and hemiparesis following cerebral infarction affecting left non-dominant side (HCC)     in 1990s    Hydronephrosis 09/01/2023    Urogenital Implants, calculus of ureter, UTI    Hyperlipidemia     Hypertension     in 1980s    Irritable bowel syndrome     in 1990s    Kidney failure     Chronic Kidney Disease, Renal Dialysis started in 1/2023    Osteoporosis 2019    Unspecified cerebral artery occlusion with  Tenderness (Right hip, right leg foreshortened, externally rotated) and deformity present.      Cervical back: Normal range of motion and neck supple. No rigidity.      Right lower leg: No edema.      Left lower leg: No edema.   Lymphadenopathy:      Cervical: No cervical adenopathy.   Skin:     General: Skin is warm and dry.      Capillary Refill: Capillary refill takes less than 2 seconds.      Coloration: Skin is not jaundiced.   Neurological:      General: No focal deficit present.      Mental Status: She is alert and oriented to person, place, and time.   Psychiatric:         Mood and Affect: Mood normal.           ED RESULTS   Laboratory results (none if blank):  Labs Reviewed   CBC WITH AUTO DIFFERENTIAL - Abnormal; Notable for the following components:       Result Value    Hematocrit 47.6 (*)     .7 (*)     MCHC 30.5 (*)     RDW-CV 16.6 (*)     RDW-SD 61.9 (*)     Immature Grans (Abs) 0.10 (*)     All other components within normal limits   COMPREHENSIVE METABOLIC PANEL     All laboratory results are individually reviewed and interpreted by me in the clinical context of this patient.  See ED course below for results interpretation if applicable.  (Any cultures that may have been sent were not resulted at the time of this patient ED visit)      Radiologic studies results available at the moment of this note (None if blank):  XR HIP 2-3 VW W PELVIS RIGHT   Final Result   1. Acute fracture in the right femoral neck.   2. Diffuse osteopenia.   3. Left hip replacement.            **This report has been created using voice recognition software. It may contain   minor errors which are inherent in voice recognition technology.**         Electronically signed by Dr. Lyly Larios        See ED course below for my interpretation if applicable.  All radiology images independently reviewed by me in the clinical context of this patient, in addition to interpretation provided by the radiologist.      EKG

## 2024-07-26 ENCOUNTER — ANESTHESIA (OUTPATIENT)
Dept: OPERATING ROOM | Age: 69
End: 2024-07-26
Payer: MEDICARE

## 2024-07-26 ENCOUNTER — ANESTHESIA EVENT (OUTPATIENT)
Dept: OPERATING ROOM | Age: 69
End: 2024-07-26
Payer: MEDICARE

## 2024-07-26 LAB
ABO: NORMAL
ANION GAP SERPL CALC-SCNC: 19 MEQ/L (ref 8–16)
ANTIBODY SCREEN: NORMAL
BUN SERPL-MCNC: 41 MG/DL (ref 7–22)
CALCIUM SERPL-MCNC: 9.3 MG/DL (ref 8.5–10.5)
CHLORIDE SERPL-SCNC: 98 MEQ/L (ref 98–111)
CO2 SERPL-SCNC: 22 MEQ/L (ref 23–33)
CREAT SERPL-MCNC: 4.9 MG/DL (ref 0.4–1.2)
DEPRECATED RDW RBC AUTO: 59.6 FL (ref 35–45)
EKG ATRIAL RATE: 91 BPM
EKG P AXIS: 78 DEGREES
EKG P-R INTERVAL: 132 MS
EKG Q-T INTERVAL: 370 MS
EKG QRS DURATION: 112 MS
EKG QTC CALCULATION (BAZETT): 455 MS
EKG R AXIS: 114 DEGREES
EKG T AXIS: 59 DEGREES
EKG VENTRICULAR RATE: 91 BPM
ERYTHROCYTE [DISTWIDTH] IN BLOOD BY AUTOMATED COUNT: 16.5 % (ref 11.5–14.5)
GFR SERPL CREATININE-BSD FRML MDRD: 9 ML/MIN/1.73M2
GLUCOSE SERPL-MCNC: 99 MG/DL (ref 70–108)
HCT VFR BLD AUTO: 47.3 % (ref 37–47)
HGB BLD-MCNC: 14.7 GM/DL (ref 12–16)
MCH RBC QN AUTO: 30.9 PG (ref 26–33)
MCHC RBC AUTO-ENTMCNC: 31.1 GM/DL (ref 32.2–35.5)
MCV RBC AUTO: 99.6 FL (ref 81–99)
OSMOLALITY SERPL CALC.SUM OF ELEC: 287.7 MOSMOL/KG (ref 275–300)
PLATELET # BLD AUTO: 239 THOU/MM3 (ref 130–400)
PMV BLD AUTO: 11.7 FL (ref 9.4–12.4)
POTASSIUM SERPL-SCNC: 4.6 MEQ/L (ref 3.5–5.2)
RBC # BLD AUTO: 4.75 MILL/MM3 (ref 4.2–5.4)
RH FACTOR: NORMAL
SODIUM SERPL-SCNC: 139 MEQ/L (ref 135–145)
WBC # BLD AUTO: 12.5 THOU/MM3 (ref 4.8–10.8)

## 2024-07-26 PROCEDURE — 3600000014 HC SURGERY LEVEL 4 ADDTL 15MIN: Performed by: ORTHOPAEDIC SURGERY

## 2024-07-26 PROCEDURE — 6370000000 HC RX 637 (ALT 250 FOR IP): Performed by: PHYSICIAN ASSISTANT

## 2024-07-26 PROCEDURE — 85027 COMPLETE CBC AUTOMATED: CPT

## 2024-07-26 PROCEDURE — 3600000004 HC SURGERY LEVEL 4 BASE: Performed by: ORTHOPAEDIC SURGERY

## 2024-07-26 PROCEDURE — 2500000003 HC RX 250 WO HCPCS: Performed by: ANESTHESIOLOGY

## 2024-07-26 PROCEDURE — 2720000010 HC SURG SUPPLY STERILE: Performed by: ORTHOPAEDIC SURGERY

## 2024-07-26 PROCEDURE — 99233 SBSQ HOSP IP/OBS HIGH 50: CPT | Performed by: INTERNAL MEDICINE

## 2024-07-26 PROCEDURE — 3700000000 HC ANESTHESIA ATTENDED CARE: Performed by: ORTHOPAEDIC SURGERY

## 2024-07-26 PROCEDURE — 2709999900 HC NON-CHARGEABLE SUPPLY: Performed by: ORTHOPAEDIC SURGERY

## 2024-07-26 PROCEDURE — 7100000001 HC PACU RECOVERY - ADDTL 15 MIN: Performed by: ORTHOPAEDIC SURGERY

## 2024-07-26 PROCEDURE — 6370000000 HC RX 637 (ALT 250 FOR IP)

## 2024-07-26 PROCEDURE — 2580000003 HC RX 258: Performed by: ANESTHESIOLOGY

## 2024-07-26 PROCEDURE — 3700000001 HC ADD 15 MINUTES (ANESTHESIA): Performed by: ORTHOPAEDIC SURGERY

## 2024-07-26 PROCEDURE — 1200000003 HC TELEMETRY R&B

## 2024-07-26 PROCEDURE — 93010 ELECTROCARDIOGRAM REPORT: CPT | Performed by: INTERNAL MEDICINE

## 2024-07-26 PROCEDURE — C1776 JOINT DEVICE (IMPLANTABLE): HCPCS | Performed by: ORTHOPAEDIC SURGERY

## 2024-07-26 PROCEDURE — 5A1D70Z PERFORMANCE OF URINARY FILTRATION, INTERMITTENT, LESS THAN 6 HOURS PER DAY: ICD-10-PCS | Performed by: INTERNAL MEDICINE

## 2024-07-26 PROCEDURE — 36415 COLL VENOUS BLD VENIPUNCTURE: CPT

## 2024-07-26 PROCEDURE — 0SRR0J9 REPLACEMENT OF RIGHT HIP JOINT, FEMORAL SURFACE WITH SYNTHETIC SUBSTITUTE, CEMENTED, OPEN APPROACH: ICD-10-PCS | Performed by: ORTHOPAEDIC SURGERY

## 2024-07-26 PROCEDURE — 86900 BLOOD TYPING SEROLOGIC ABO: CPT

## 2024-07-26 PROCEDURE — 86901 BLOOD TYPING SEROLOGIC RH(D): CPT

## 2024-07-26 PROCEDURE — 99222 1ST HOSP IP/OBS MODERATE 55: CPT | Performed by: INTERNAL MEDICINE

## 2024-07-26 PROCEDURE — C1713 ANCHOR/SCREW BN/BN,TIS/BN: HCPCS | Performed by: ORTHOPAEDIC SURGERY

## 2024-07-26 PROCEDURE — 90935 HEMODIALYSIS ONE EVALUATION: CPT

## 2024-07-26 PROCEDURE — 86850 RBC ANTIBODY SCREEN: CPT

## 2024-07-26 PROCEDURE — 80048 BASIC METABOLIC PNL TOTAL CA: CPT

## 2024-07-26 PROCEDURE — 7100000000 HC PACU RECOVERY - FIRST 15 MIN: Performed by: ORTHOPAEDIC SURGERY

## 2024-07-26 PROCEDURE — 2580000003 HC RX 258: Performed by: PHYSICIAN ASSISTANT

## 2024-07-26 PROCEDURE — 6360000002 HC RX W HCPCS: Performed by: ANESTHESIOLOGY

## 2024-07-26 DEVICE — HIP H4 TOT HEMI UNIV BIPLR IMPL CAPPED H4 SN: Type: IMPLANTABLE DEVICE | Site: HIP | Status: FUNCTIONAL

## 2024-07-26 DEVICE — CONQUEST FX FEMORAL COMPONENT SIZE 11
Type: IMPLANTABLE DEVICE | Site: HIP | Status: FUNCTIONAL
Brand: CONQUEST FX

## 2024-07-26 DEVICE — RALLY MV AB BONE CEMENT 40 GRAMS
Type: IMPLANTABLE DEVICE | Site: HIP | Status: FUNCTIONAL
Brand: RALLY

## 2024-07-26 DEVICE — TANDEM UNIPOLAR 12/14 TAPER SLEEVE -3
Type: IMPLANTABLE DEVICE | Site: HIP | Status: FUNCTIONAL
Brand: TANDEM

## 2024-07-26 DEVICE — TANDEM UNIPOLAR HEAD 44MM
Type: IMPLANTABLE DEVICE | Site: HIP | Status: FUNCTIONAL
Brand: TANDEM

## 2024-07-26 RX ORDER — ROCURONIUM BROMIDE 10 MG/ML
INJECTION, SOLUTION INTRAVENOUS PRN
Status: DISCONTINUED | OUTPATIENT
Start: 2024-07-26 | End: 2024-07-26 | Stop reason: SDUPTHER

## 2024-07-26 RX ORDER — SODIUM CHLORIDE 9 MG/ML
INJECTION, SOLUTION INTRAVENOUS CONTINUOUS PRN
Status: DISCONTINUED | OUTPATIENT
Start: 2024-07-26 | End: 2024-07-26 | Stop reason: SDUPTHER

## 2024-07-26 RX ORDER — LIDOCAINE HCL/PF 100 MG/5ML
SYRINGE (ML) INJECTION PRN
Status: DISCONTINUED | OUTPATIENT
Start: 2024-07-26 | End: 2024-07-26 | Stop reason: SDUPTHER

## 2024-07-26 RX ORDER — CEFAZOLIN SODIUM 1 G/3ML
INJECTION, POWDER, FOR SOLUTION INTRAMUSCULAR; INTRAVENOUS PRN
Status: DISCONTINUED | OUTPATIENT
Start: 2024-07-26 | End: 2024-07-26 | Stop reason: SDUPTHER

## 2024-07-26 RX ORDER — CLOPIDOGREL BISULFATE 75 MG/1
75 TABLET ORAL DAILY
Status: DISCONTINUED | OUTPATIENT
Start: 2024-07-27 | End: 2024-07-30 | Stop reason: HOSPADM

## 2024-07-26 RX ORDER — TRANEXAMIC ACID 100 MG/ML
INJECTION, SOLUTION INTRAVENOUS PRN
Status: DISCONTINUED | OUTPATIENT
Start: 2024-07-26 | End: 2024-07-26 | Stop reason: SDUPTHER

## 2024-07-26 RX ORDER — PROPOFOL 10 MG/ML
INJECTION, EMULSION INTRAVENOUS PRN
Status: DISCONTINUED | OUTPATIENT
Start: 2024-07-26 | End: 2024-07-26 | Stop reason: SDUPTHER

## 2024-07-26 RX ORDER — FENTANYL CITRATE 50 UG/ML
INJECTION, SOLUTION INTRAMUSCULAR; INTRAVENOUS PRN
Status: DISCONTINUED | OUTPATIENT
Start: 2024-07-26 | End: 2024-07-26 | Stop reason: SDUPTHER

## 2024-07-26 RX ADMIN — OXYCODONE HYDROCHLORIDE AND ACETAMINOPHEN 2 TABLET: 5; 325 TABLET ORAL at 23:46

## 2024-07-26 RX ADMIN — DOCUSATE SODIUM 100 MG: 100 CAPSULE, LIQUID FILLED ORAL at 14:11

## 2024-07-26 RX ADMIN — FOLIC ACID 500 MCG: 1 TABLET ORAL at 14:07

## 2024-07-26 RX ADMIN — PROPOFOL 100 MG: 10 INJECTION, EMULSION INTRAVENOUS at 10:28

## 2024-07-26 RX ADMIN — CARBIDOPA AND LEVODOPA 1 TABLET: 10; 100 TABLET ORAL at 20:13

## 2024-07-26 RX ADMIN — ROCURONIUM BROMIDE 40 MG: 10 INJECTION INTRAVENOUS at 10:28

## 2024-07-26 RX ADMIN — BUPROPION HYDROCHLORIDE 300 MG: 300 TABLET, EXTENDED RELEASE ORAL at 14:07

## 2024-07-26 RX ADMIN — SUGAMMADEX 96 MG: 100 INJECTION, SOLUTION INTRAVENOUS at 11:10

## 2024-07-26 RX ADMIN — FENTANYL CITRATE 50 MCG: 50 INJECTION, SOLUTION INTRAMUSCULAR; INTRAVENOUS at 10:28

## 2024-07-26 RX ADMIN — SODIUM CHLORIDE, PRESERVATIVE FREE 10 ML: 5 INJECTION INTRAVENOUS at 14:11

## 2024-07-26 RX ADMIN — Medication 60 MG: at 10:28

## 2024-07-26 RX ADMIN — TRAZODONE HYDROCHLORIDE 100 MG: 100 TABLET ORAL at 20:13

## 2024-07-26 RX ADMIN — MEGESTROL ACETATE 40 MG: 40 TABLET ORAL at 14:11

## 2024-07-26 RX ADMIN — AMLODIPINE BESYLATE 10 MG: 10 TABLET ORAL at 14:08

## 2024-07-26 RX ADMIN — OXYCODONE HYDROCHLORIDE AND ACETAMINOPHEN 2 TABLET: 5; 325 TABLET ORAL at 07:57

## 2024-07-26 RX ADMIN — SERTRALINE 50 MG: 50 TABLET, FILM COATED ORAL at 14:07

## 2024-07-26 RX ADMIN — CEFAZOLIN 2 G: 1 INJECTION, POWDER, FOR SOLUTION INTRAMUSCULAR; INTRAVENOUS at 10:41

## 2024-07-26 RX ADMIN — PHENYLEPHRINE HYDROCHLORIDE 100 MCG: 10 INJECTION INTRAVENOUS at 10:52

## 2024-07-26 RX ADMIN — FLUTICASONE PROPIONATE 2 SPRAY: 50 SPRAY, METERED NASAL at 14:07

## 2024-07-26 RX ADMIN — OXYCODONE HYDROCHLORIDE AND ACETAMINOPHEN 2 TABLET: 5; 325 TABLET ORAL at 14:27

## 2024-07-26 RX ADMIN — DOCUSATE SODIUM 100 MG: 100 CAPSULE, LIQUID FILLED ORAL at 20:13

## 2024-07-26 RX ADMIN — PRIMIDONE 50 MG: 50 TABLET ORAL at 14:07

## 2024-07-26 RX ADMIN — OXYCODONE HYDROCHLORIDE AND ACETAMINOPHEN 2 TABLET: 5; 325 TABLET ORAL at 03:20

## 2024-07-26 RX ADMIN — SODIUM CHLORIDE: 9 INJECTION, SOLUTION INTRAVENOUS at 10:26

## 2024-07-26 RX ADMIN — SEVELAMER CARBONATE 800 MG: 800 TABLET, FILM COATED ORAL at 14:07

## 2024-07-26 RX ADMIN — SODIUM CHLORIDE, PRESERVATIVE FREE 10 ML: 5 INJECTION INTRAVENOUS at 20:13

## 2024-07-26 RX ADMIN — TRANEXAMIC ACID 1000 MG: 100 INJECTION, SOLUTION INTRAVENOUS at 10:36

## 2024-07-26 RX ADMIN — CARBIDOPA AND LEVODOPA 1 TABLET: 10; 100 TABLET ORAL at 14:07

## 2024-07-26 RX ADMIN — SENNOSIDES 8.6 MG: 8.6 TABLET, FILM COATED ORAL at 20:13

## 2024-07-26 ASSESSMENT — PAIN SCALES - GENERAL
PAINLEVEL_OUTOF10: 7
PAINLEVEL_OUTOF10: 8
PAINLEVEL_OUTOF10: 4
PAINLEVEL_OUTOF10: 7
PAINLEVEL_OUTOF10: 7

## 2024-07-26 ASSESSMENT — PAIN DESCRIPTION - DESCRIPTORS
DESCRIPTORS: SORE
DESCRIPTORS: ACHING
DESCRIPTORS: ACHING
DESCRIPTORS: SORE

## 2024-07-26 ASSESSMENT — PAIN DESCRIPTION - LOCATION
LOCATION: HIP

## 2024-07-26 ASSESSMENT — ENCOUNTER SYMPTOMS: SHORTNESS OF BREATH: 1

## 2024-07-26 ASSESSMENT — PAIN DESCRIPTION - ORIENTATION
ORIENTATION: RIGHT

## 2024-07-26 ASSESSMENT — PAIN - FUNCTIONAL ASSESSMENT
PAIN_FUNCTIONAL_ASSESSMENT: PREVENTS OR INTERFERES WITH MANY ACTIVE NOT PASSIVE ACTIVITIES
PAIN_FUNCTIONAL_ASSESSMENT: PREVENTS OR INTERFERES SOME ACTIVE ACTIVITIES AND ADLS

## 2024-07-26 NOTE — CARE COORDINATION
07/26/24 1238   Readmission Assessment   Number of Days since last admission? 8-30 days   Previous Disposition Home with Home Health   Who is being Interviewed Caregiver   What was the patient's/caregiver's perception as to why they think they needed to return back to the hospital? Other (Comment)  (fell at home 7/25fell broke right hip, OR today 7/26)   Did you visit your Primary Care Physician after you left the hospital, before you returned this time? Yes   Did you see a specialist, such as Cardiac, Pulmonary, Orthopedic Physician, etc. after you left the hospital? Yes   Who advised the patient to return to the hospital? Self-referral   Does the patient report anything that got in the way of taking their medications? No   In our efforts to provide the best possible care to you and others like you, can you think of anything that we could have done to help you after you left the hospital the first time, so that you might not have needed to return so soon? Other (Comment)  (no nothing per )

## 2024-07-26 NOTE — PROGRESS NOTES
In to see patient today regarding consult for DTPI to left buttock.  Patient off unit to surgery at this time.  Will plan to see at another time.

## 2024-07-26 NOTE — PLAN OF CARE
Problem: Discharge Planning  Goal: Discharge to home or other facility with appropriate resources  7/26/2024 0127 by Jenifer Church RN  Outcome: Progressing  Flowsheets (Taken 7/26/2024 0127)  Discharge to home or other facility with appropriate resources:   Identify barriers to discharge with patient and caregiver   Arrange for needed discharge resources and transportation as appropriate   Identify discharge learning needs (meds, wound care, etc)     Problem: Pain  Goal: Verbalizes/displays adequate comfort level or baseline comfort level  7/26/2024 0127 by Jenifer Church RN  Outcome: Progressing  Flowsheets (Taken 7/26/2024 0127)  Verbalizes/displays adequate comfort level or baseline comfort level:   Encourage patient to monitor pain and request assistance   Assess pain using appropriate pain scale   Administer analgesics based on type and severity of pain and evaluate response   Implement non-pharmacological measures as appropriate and evaluate response     Problem: Safety - Adult  Goal: Free from fall injury  7/26/2024 0127 by Jenifer Church, RN  Outcome: Progressing  Flowsheets (Taken 7/26/2024 0127)  Free From Fall Injury: Instruct family/caregiver on patient safety     Problem: ABCDS Injury Assessment  Goal: Absence of physical injury  7/26/2024 0127 by Jenifer Church RN  Outcome: Progressing  Flowsheets (Taken 7/26/2024 0127)  Absence of Physical Injury: Implement safety measures based on patient assessment     Problem: Skin/Tissue Integrity  Goal: Absence of new skin breakdown  Description: 1.  Monitor for areas of redness and/or skin breakdown  2.  Assess vascular access sites hourly  3.  Every 4-6 hours minimum:  Change oxygen saturation probe site  4.  Every 4-6 hours:  If on nasal continuous positive airway pressure, respiratory therapy assess nares and determine need for appliance change or resting period.  7/26/2024 0127 by Jenifer Church, RN  Outcome: Progressing     Problem: Confusion  Goal:

## 2024-07-26 NOTE — ANESTHESIA POSTPROCEDURE EVALUATION
Department of Anesthesiology  Postprocedure Note    Patient: Deloris Watts  MRN: [id number]  YOB: 1955  Date of evaluation: 7/26/2024    Procedure Summary       Date: 07/26/24 Room / Location: [facility]  / [facility]    Anesthesia Start: 1026 Anesthesia Stop: 1128    Procedure: RIGHT HIP HEMIARTHROPLASTY (Right: Hip) Diagnosis:       Right hip pain      (Right hip pain [M25.551])    Surgeons: Ebenezer Mantilla MD Responsible Provider: Bar Lawton DO    Anesthesia Type: general ASA Status: 3            Anesthesia Type: No value filed.    Cosmo Phase I: Cosmo Score: 5    Cosmo Phase II:      Anesthesia Post Evaluation    Patient location during evaluation: PACU  Patient participation: complete - patient participated  Level of consciousness: awake  Airway patency: patent  Nausea & Vomiting: no nausea and no vomiting  Cardiovascular status: hemodynamically stable  Respiratory status: acceptable  Hydration status: stable  Pain management: adequate    No notable events documented.

## 2024-07-26 NOTE — CONSULTS
Nightly, Virgilio Wheeler DO    sevelamer (RENVELA) tablet 800 mg, 800 mg, Oral, BID with meals, Virgilio Wheeler DO, 800 mg at 07/25/24 2311    traZODone (DESYREL) tablet 100 mg, 100 mg, Oral, Nightly, Virgilio Wheeler DO, 100 mg at 07/25/24 2311    sertraline (ZOLOFT) tablet 50 mg, 50 mg, Oral, Daily, Virgilio Wheeler DO, 50 mg at 07/25/24 2311    amLODIPine (NORVASC) tablet 10 mg, 10 mg, Oral, Daily, Virgilio Wheeler DO    sodium chloride flush 0.9 % injection 5-40 mL, 5-40 mL, IntraVENous, 2 times per day, Virgilio Wheeler DO, 10 mL at 07/25/24 2313    sodium chloride flush 0.9 % injection 5-40 mL, 5-40 mL, IntraVENous, PRN, Virgilio Wheeler DO    0.9 % sodium chloride infusion, , IntraVENous, PRN, Virgilio Wheeler DO    ondansetron (ZOFRAN-ODT) disintegrating tablet 4 mg, 4 mg, Oral, Q8H PRN **OR** ondansetron (ZOFRAN) injection 4 mg, 4 mg, IntraVENous, Q6H PRN, Virgilio Wheeler DO    polyethylene glycol (GLYCOLAX) packet 17 g, 17 g, Oral, Daily PRN, Virgilio Wheeler DO    acetaminophen (TYLENOL) tablet 650 mg, 650 mg, Oral, Q6H PRN **OR** acetaminophen (TYLENOL) suppository 650 mg, 650 mg, Rectal, Q6H PRN, Virgilio Wheeler DO    oxyCODONE-acetaminophen (PERCOCET) 5-325 MG per tablet 1 tablet, 1 tablet, Oral, Q4H PRN **OR** oxyCODONE-acetaminophen (PERCOCET) 5-325 MG per tablet 2 tablet, 2 tablet, Oral, Q4H PRN, Virgilio Wheeler DO, 2 tablet at 07/26/24 0320     Allergies:  Pioglitazone, Amoxicillin, Amoxicillin-pot clavulanate, Other, Ciprofloxacin, and Sulfa antibiotics  Social History:    Social History     Socioeconomic History    Marital status:      Spouse name: Tenzin    Number of children: 2    Years of education: Not on file    Highest education level: Not on file   Occupational History    Occupation: unemployed at present time   Tobacco Use    Smoking status: Never    Smokeless tobacco: Never   Vaping Use

## 2024-07-26 NOTE — PROGRESS NOTES
Pt admitted to  7K18 via cart/stretcher.     Complaints: fall, right hip fracture.      IV saline locked at left forearm. IV site free of s/s of infection or infiltration. Vital signs obtained. Assessment and data collection initiated.     Two nurse skin assessment performed by Jenifer BILL and Jane BILL. Oriented to room.     Policies and procedures for  explained. All questions answered with no further questions at this time. Fall prevention and safety brochure discussed with patient.  Bed alarm on. Call light in reach.

## 2024-07-26 NOTE — PROGRESS NOTES
[facility]  PHYSICAL THERAPY MISSED TREATMENT NOTE  [facility] OR    Date: 2024  Patient Name: Deloris Watts        MRN: [id number]   : 1955  (68 y.o.)  Gender: female                REASON FOR MISSED TREATMENT:  PT to hold due to patient on bedrest, has dialysis today, and then to OR for R femur fracture. PT to check back another date.       Fani Gonzalez, MPT [id number]

## 2024-07-26 NOTE — FLOWSHEET NOTE
07/26/24 1437 07/26/24 1737   Vital Signs   BP (!) 143/71 133/72   Temp 97.6 °F (36.4 °C) 97.3 °F (36.3 °C)   Pulse 89 92   Respirations 16 14   SpO2 96 % 100 %   Weight - Scale 46.9 kg (103 lb 6.3 oz) 46.9 kg (103 lb 6.3 oz)   Weight Method Bed scale Bed scale   Percent Weight Change -2.29 0   Post-Hemodialysis Assessment   Post-Treatment Procedures  --  Blood returned;Catheter Capped, clamped with Saline x2 ports   Machine Disinfection Process  --  Acid/Vinegar Clean;Heat Disinfect;Exterior Machine Disinfection   Blood Volume Processed (Liters)  --  56 L   Dialyzer Clearance  --  Lightly streaked   Duration of Treatment (minutes)  --  150 minutes   Heparin Amount Administered During Treatment (mL)  --  0 mL   Hemodialysis Intake (ml)  --  600 ml   Hemodialysis Output (ml)  --  600 ml   NET Removed (ml)  --  0   Tolerated Treatment  --  Poor     Stable 2.5 hour treatment completed. Removed no fluid, patient became hypotensive at start of treatment.  HD catheter clamped, and flushed with normal saline with tegos in place. Dressing clean, dry, and intact. Report called to primary RN. Treatment record printed to be scanned into EMR.

## 2024-07-26 NOTE — CARE COORDINATION
Case Management Assessment Initial Evaluation    Date/Time of Evaluation: 7/26/2024 8:02 AM  Assessment Completed by: Kathy Jaimes RN    If patient is discharged prior to next notation, then this note serves as note for discharge by case management.    Patient Name: Deloris Watts                   YOB: 1955  Diagnosis: Fall, initial encounter [W19.XXXA]  Closed displaced fracture of right femoral neck (HCC) [S72.001A]                   Date / Time: 7/25/2024  3:55 PM  Location: [address] [place] [address]     Patient Admission Status: Inpatient   Readmission Risk Low 0-14, Mod 15-19), High > 20: Readmission Risk Score: 35.8    Current PCP: Johana Weldon MD    Health Care Decision Makers:   Primary Decision Maker: Tnezin Watts A - Spouse - [phone]    PM Hx: ESRD on HD Mon, Wed, Fri, HTN, ischemic CVA , Parkinsons dz, left parafalcine subdural hematoma.     Additional Case Management Notes: to ED by squad from home for evaluation of fall, right hip pain.  Patient had unwitnessed fall at home patient accompanied by  who states that she is taking Plavix and aspirin for previous history of stroke.      Procedures: na    Imaging: xray right hip pelvis:  1. Acute fracture in the right femoral neck.   2. Diffuse osteopenia.   3. Left hip replacement.   Patient Goals/Plan/Treatment Preferences: Spoke with patient's  Tenzin as pt was in surgery. They live at home, she has needed DME, insurance confirmed, confirmed HD at [facility] on Mon, Wed, Fri at 3 PM and  provides transportation. He request IP rehab when ready. Will follow.        07/26/24 1228   Service Assessment   Patient Orientation Alert and Oriented;Person;Place   Cognition Severely Impaired   History Provided By Spouse   Primary Caregiver Spouse   Accompanied By/Relationship  Tenzin   Support Systems Spouse/Significant Other   Patient's Healthcare Decision Maker is: Named in Scanned ACP Document   PCP Verified by ISS[facility]

## 2024-07-26 NOTE — PROGRESS NOTES
1128- pt to pacu. Pt non responsive; VSS. Pt appears in no acute distress.    1137- pt resting in bed eyes closed, snoring resp/ pt remains non responsive.    1141- pt responsive to name. VSS. Pt does not answer questions, only says \"hu\" to name.    1147-Report called to 7K nurse Khalida. Pt continues to deny complaint. VSS.    1158- pt continues to deny complaint, resting in bed eyes closed. VSS Transport requested.    1208- pt resting in bed eyes closed. VSS    1218- pt resting in bed eyes closed. VSS.    1236- pt resting in bed eyes closed. VSS. Pt denies complaint when woke, drifts back to sleep quickly.    1242- pt continue to rest in bed eyes closed. VSS  continue to await transport.    1251- pt resting in bed eyes closed. VSS    1311- Transport arrives to return pt to 7K.

## 2024-07-26 NOTE — PROGRESS NOTES
Hospitalist Progress Note  Internal Medicine Resident      Patient: Deloris Watts 68 y.o. female      Unit/Bed: 7K-18/018-A    Admit Date: 7/25/2024      ASSESSMENT AND PLAN  Active Problems  Right femoral neck fracture, mechanical fall: X-ray hip 7/25: Acute fracture of the right femoral neck, diffuse osteopenia, left hip replacement.  Underwent surgery 7/26  Fall precautions  ESRD: HD on Monday Wednesday Friday  Nephrology consulted  Continue Renvela 800 mg twice daily  Monitor electrolyte  Monitor daily BMP  Physical deconditioning: BMI of 20.19  Continue Megace 40 mg daily  Leukocytosis: Likely reactive.  WBC 7/26: 12.5  Continue to monitor CBC  Hyperphosphatemia:  Continue Renvela 800 mg twice daily  Hypertension:  Continue amlodipine 10 mg daily  Parkinson's:  Continue Sinemet  mg p.o. 3 times daily  Continue primidone 50 mg daily  MDD/GABBY:  Continue Zoloft 50 mg daily  Continue Wellbutrin 150 mg every morning and 300 mg daily  Continue trazodone 100 mg nightly  HLD:  Simvastatin held  History of left parafalcine subdural hematoma:  Previously on Keppra, discontinued per neurology  History of ischemic CVA: Reported 15+ years ago.  Chronic left-sided weakness  Plavix held due to surgery      LDA: []CVC / []PICC / []Midline / [x] external urinary catheter/ []Drains / [x]Mediport / []None  Antibiotics: None  Steroids: None  Labs (still needed?): [x]Yes / []No  IVF (still needed?): []Yes / [x]No    Level of care: []Step Down / [x]Med-Surg  Bed Status: [x]Inpatient / []Observation  Telemetry: [x]Yes / []No  PT/OT: []Yes / []No    DVT Prophylaxis: [] Lovenox / [] Heparin / [] SCDs / [] Already on Systemic Anticoagulation / [x] None     Expected discharge date: Pending  Disposition: Pending     Code status: Full Code     ===================================================================    Chief Complaint: Fall, right hip pain  Subjective (past 24 hours): Patient was seen at bedside today before being

## 2024-07-26 NOTE — CONSULTS
ProviderShey MD   sodium chloride (OCEAN, BABY AYR) 0.65 % nasal spray 1 spray by Nasal route as needed for Congestion Saline nasal spray as needed    Shey New MD   montelukast (SINGULAIR) 10 MG tablet Take 1 tablet by mouth daily  Patient not taking: Reported on 6/19/2024 2/20/24   Johana Weldon MD   bisacodyl (DULCOLAX) 10 MG suppository Place 1 suppository rectally daily as needed for Constipation 1/5/24   Jax Whatley MD   fluticasone (FLONASE) 50 MCG/ACT nasal spray 2 sprays by Each Nostril route daily 1/5/24   Jax Whatley MD   simvastatin (ZOCOR) 20 MG tablet TAKE 1 TABLET BY MOUTH DAILY  Patient not taking: Reported on 6/19/2024 6/1/23   Johana Weldon MD   sevelamer (RENVELA) 800 MG tablet Take 1 tablet by mouth 3 times daily With meals 5/1/23   Shey New MD   linaclotide (LINZESS) 145 MCG capsule Take 2 capsules by mouth daily as needed (constipation) PRN    Shey New MD   blood glucose monitor strips Test 1-2x/day. 3/9/23   Johana Weldon MD   ondansetron (ZOFRAN-ODT) 4 MG disintegrating tablet Take 1 tablet by mouth 3 times daily as needed for Nausea or Vomiting 2/28/23   Johana Weldon MD   folic acid (FOLVITE) 400 MCG tablet Take 1 tablet by mouth daily    Shey New MD       Review of Systems:  Constitutional: no fever or chills  Head: No headaches  Eyes: no blurry vision, no discharge  Ears: no ear pain or hearing changes  Nose: no runny nose or epistaxis  Respiratory: no shortness of breath or cough or sputum production  Cardiovascular: no chest pain, no edema  GI: no nausea, no vomiting or diarrhea  : denies any hematuria,  Skin: no rash  Musculoskeletal: right hip pain  Neuro: no tremor, no slurred speech  Psychiatric: stable mood, no depression or insomnia    Current Meds:  Infusion:    sodium chloride       Meds:    [START ON 7/27/2024] clopidogrel  75 mg Oral Daily    buPROPion  150 mg Oral QAM     from nose.   Oral: moist oral mucus membranes  Neck: No jugular venous distention, appears symmetric, good ROM  Lungs: Air entry B/L, no crackles or rales, no use of accessory muscles  Heart: regular rate, S1, S2  Extremities :  no leg edema, right hip pain  GI: soft, non-tender, no guarding  Skin: no rash seen on exposed extremities  Musculo: moves all extremities  Neuro: no slurred speech, no facial drooping, no asterixis  Psychiatric: Awake, alert, Oriented    Lab Data  CBC:   Recent Labs     07/25/24  1627 07/26/24  0638   WBC 9.2 12.5*   HGB 14.5 14.7   HCT 47.6* 47.3*    239     BMP:  Recent Labs     07/25/24  1627 07/26/24  0638    139   K 4.2 4.6   CL 98 98   CO2 25 22*   BUN 34* 41*   CREATININE 4.4* 4.9*   GLUCOSE 80 99   CALCIUM 9.0 9.3     PTH: @PTH@  TSH: No results for input(s): \"TSH\" in the last 72 hours.  HgBa1c: No results for input(s): \"LABA1C\" in the last 72 hours.  Hepatic:   Recent Labs     07/25/24 1627   AST 19   ALT <5*   BILITOT 0.4   ALKPHOS 116     ABGs: No results found for: \"PHART\", \"PO2ART\", \"HVK1BSN\"  Troponin: No results for input(s): \"TROPONINI\" in the last 72 hours.  BNP: No results for input(s): \"BNP\" in the last 72 hours.    Old labs reviewed.       Impression and Plan:  ESRD on HD MWF : plan for HD today  Mild metabolic acidosis  Right femur fracture  S/p fall  Leukocytosis  HTN  Hyperphosphatemia: continue phos binder    Thank you for allowing me to participate in the care of this patient. Please feel free to call me if you have any questions.     Electronically signed by Charlotte Ballard DO on 7/26/24 at 2:41 PM EDT    [facility]

## 2024-07-26 NOTE — ANESTHESIA PRE PROCEDURE
tablet Take 1 tablet by mouth daily    Provider, MD Shey       Current medications:    Current Facility-Administered Medications   Medication Dose Route Frequency Provider Last Rate Last Admin    ceFAZolin (ANCEF) 2,000 mg in sterile water 20 mL IV syringe  2,000 mg IntraVENous On Call to OR Mikayla Doll PA-C        buPROPion (WELLBUTRIN XL) extended release tablet 150 mg  150 mg Oral QAM Virgilio Wheeler DO        buPROPion (WELLBUTRIN XL) extended release tablet 300 mg  300 mg Oral Daily Virgilio Wheeler DO        carbidopa-levodopa (SINEMET)  MG per tablet 1 tablet  1 tablet Oral TID Virgilio Wheeler DO        [Held by provider] clopidogrel (PLAVIX) tablet 75 mg  75 mg Oral Daily Virgilio Wheeler DO        docusate sodium (COLACE) capsule 100 mg  100 mg Oral BID Virgilio Wheeler DO        fluticasone (FLONASE) 50 MCG/ACT nasal spray 2 spray  2 spray Each Nostril Daily Virgilio Wheeler DO   2 spray at 07/25/24 2311    folic acid (FOLVITE) tablet 500 mcg  500 mcg Oral Daily Virgilio Wheeler DO   500 mcg at 07/25/24 2311    megestrol (MEGACE) tablet 40 mg  40 mg Oral Daily Virgilio Wheeler DO        primidone (MYSOLINE) tablet 50 mg  50 mg Oral Daily Virgilio Wheeler DO   50 mg at 07/25/24 2311    senna (SENOKOT) tablet 8.6 mg  1 tablet Oral Nightly Virgilio Wheeler DO        sevelamer (RENVELA) tablet 800 mg  800 mg Oral BID with meals Virgilio Wheeler DO   800 mg at 07/25/24 2311    traZODone (DESYREL) tablet 100 mg  100 mg Oral Nightly Virgilio Wheeler DO   100 mg at 07/25/24 2311    sertraline (ZOLOFT) tablet 50 mg  50 mg Oral Daily Virgilio Wheeler DO   50 mg at 07/25/24 2311    amLODIPine (NORVASC) tablet 10 mg  10 mg Oral Daily Virgilio Wheeler DO        sodium chloride flush 0.9 % injection 5-40 mL  5-40 mL IntraVENous 2 times per day Virgilio Wheeler DO   10 mL at 07/25/24 9776

## 2024-07-26 NOTE — DISCHARGE INSTR - COC
Continuity of Care Form    Patient Name: Deloris Watts   :  1955  MRN:  [id number]    Admit date:  2024  Discharge date:  ***    Code Status Order: Full Code   Advance Directives:     Admitting Physician:  Matthew Viera MD  PCP: Johana Weldon MD    Discharging Nurse: ***  Discharging Hospital Unit/Room#: [facility] OR (General) POOL RM/NONE  Discharging Unit Phone Number: ***    Emergency Contact:   Extended Emergency Contact Information  Primary Emergency Contact: Tenzin Watts ALESIA  Address: [address] [place] [id number] [facility]  Home Phone: [phone]  Mobile Phone: [phone]  Relation: Spouse   needed? No  Secondary Emergency Contact: Layla Rose   [facility]  Home Phone: [phone]  Mobile Phone: [phone]  Relation: Child   needed? No    Past Surgical History:  Past Surgical History:   Procedure Laterality Date    CARPAL TUNNEL RELEASE Right     in     COLONOSCOPY  [address] [place] [address]    CT BIOPSY RENAL  2022    CT BIOPSY RENAL 2022 [facility] CT SCAN    CYSTOSCOPY Left 2023    CYSTOSCOPY, LEFT URETERAL STENT PLACEMENT performed by Edilson Whalen MD at [facility] OR    ENDOSCOPY, COLON, DIAGNOSTIC  unsure    HEMORRHOID SURGERY  unsure    HIP SURGERY Left 2024    Left hemiarthroplasty performed by Virgilio Lezama DO at [facility] OR    HUMERUS FRACTURE SURGERY Right     ORIF    HYSTERECTOMY (CERVIX STATUS UNKNOWN)      age 38    LASIK      lasik    NECK SURGERY  18  years ago    cervical spine fusion ; in     OVARY REMOVAL Bilateral     age 38    SINUS SURGERY  10 years ago    SPINE SURGERY N/A 2023    KYPHOPLASTY L2 performed by Nimisha Garza MD at [facility] OR    TUBAL LIGATION      URETER SURGERY N/A 2023    CYSTOSCOPY, LEFT  URETEROSCOPY, LASER LITHOTRIPSIE OF STONES performed by Edilson Whalen MD at [facility] OR       Immunization History:   Immunization History   Administered Date(s) Administered     Good    Recommended Labs or Other Treatments After Discharge:     Physician Certification: I certify the above information and transfer of Deloris Watts  is necessary for the continuing treatment of the diagnosis listed and that she requires Acute Rehab for greater 30 days.     Update Admission H&P: No change in H&P    PHYSICIAN SIGNATURE:  Electronically signed by Jimmy Gamboa MD on 7/30/24 at 1:11 PM EDT

## 2024-07-26 NOTE — PROGRESS NOTES
[facility]  OCCUPATIONAL THERAPY MISSED TREATMENT NOTE  [facility] ORTHOPEDICS 7K  7K-18/018-A      Date: 2024  Patient Name: Deloris Watts        CSN: [id number]   : 1955  (68 y.o.)  Gender: female                REASON FOR MISSED TREATMENT:  OT to hold due to patient on bedrest, has dialysis today, and then to OR for R femur fracture. OT to check back another date.

## 2024-07-26 NOTE — OP NOTE
Operative Note      Patient: Deloris Watts  YOB: 1955  MRN: [id number]    Date of Procedure: 7/26/2024    Preop diagnosis: Right closed displaced femoral neck fracture    Post-Op Diagnosis: Same       Procedure: Open treatment right femoral neck fracture with a prosthesis 24405    Surgeon(s):  Ebenezer Mantilla MD    Assistant:   Physician Assistant: Mahesh Mccabe PA-C    The physician assistant was present for the entirety of the procedure and vital for the performance of the procedure.  He assisted with positioning, prepping, draping of the patient before the procedure and instrument manipulation, extremity repositioning  as well as wound closure, dressing application after the procedure. Please note that no intern, resident, or other hospital staff was available to assist during the surgery    Anesthesia: General    Estimated Blood Loss (mL): 200     Complications: None    Specimens:   * No specimens in log *    Implants:  * No implants in log *      Drains:   External Urinary Catheter (Active)   Site Assessment Clean,dry & intact 07/26/24 0948   Placement Replaced 07/26/24 0330   Catheter Care Catheter/Wick replaced 07/26/24 0330   Perineal Care Yes 07/26/24 0330   Suction 40 mmgHg continuous 07/26/24 0330   Urine Color Yellow 07/26/24 0330   Urine Appearance Clear 07/26/24 0330   Urine Odor Other (Comment) 07/26/24 0330   Output (mL) 100 mL 07/26/24 0330       Findings:  Infection Present At Time Of Surgery (PATOS) (choose all levels that have infection present):  No infection present  Other Findings: Confirmed    Detailed Description of Procedure:         INDICATION FOR PROCEDURE     The patient is an 68 y.o.-year-old with a history of a fall.  Patient complained of Right hip pain. The patient has multiple medical comorbidities and was admitted with a closed displaced femoral neck fracture. Hospitalist was consulted for medical clearance.  They felt patient to be of acceptable risk for

## 2024-07-26 NOTE — CARE COORDINATION
7/26/24, 3:53 PM EDT    DISCHARGE PLANNING EVALUATION    Spoke with ISSA Chavez. Patient would like [facility] or [facility]. No precert needed.

## 2024-07-27 LAB
ANION GAP SERPL CALC-SCNC: 14 MEQ/L (ref 8–16)
BUN SERPL-MCNC: 32 MG/DL (ref 7–22)
CALCIUM SERPL-MCNC: 8.4 MG/DL (ref 8.5–10.5)
CHLORIDE SERPL-SCNC: 99 MEQ/L (ref 98–111)
CO2 SERPL-SCNC: 23 MEQ/L (ref 23–33)
CREAT SERPL-MCNC: 3.7 MG/DL (ref 0.4–1.2)
DEPRECATED RDW RBC AUTO: 60.1 FL (ref 35–45)
ERYTHROCYTE [DISTWIDTH] IN BLOOD BY AUTOMATED COUNT: 16.6 % (ref 11.5–14.5)
GFR SERPL CREATININE-BSD FRML MDRD: 13 ML/MIN/1.73M2
GLUCOSE SERPL-MCNC: 173 MG/DL (ref 70–108)
HCT VFR BLD AUTO: 34.6 % (ref 37–47)
HGB BLD-MCNC: 10.8 GM/DL (ref 12–16)
MCH RBC QN AUTO: 31.1 PG (ref 26–33)
MCHC RBC AUTO-ENTMCNC: 31.2 GM/DL (ref 32.2–35.5)
MCV RBC AUTO: 99.7 FL (ref 81–99)
PLATELET # BLD AUTO: 186 THOU/MM3 (ref 130–400)
PMV BLD AUTO: 12.1 FL (ref 9.4–12.4)
POTASSIUM SERPL-SCNC: 4.2 MEQ/L (ref 3.5–5.2)
RBC # BLD AUTO: 3.47 MILL/MM3 (ref 4.2–5.4)
SODIUM SERPL-SCNC: 136 MEQ/L (ref 135–145)
WBC # BLD AUTO: 19 THOU/MM3 (ref 4.8–10.8)

## 2024-07-27 PROCEDURE — 36415 COLL VENOUS BLD VENIPUNCTURE: CPT

## 2024-07-27 PROCEDURE — 97110 THERAPEUTIC EXERCISES: CPT

## 2024-07-27 PROCEDURE — 97530 THERAPEUTIC ACTIVITIES: CPT

## 2024-07-27 PROCEDURE — 85027 COMPLETE CBC AUTOMATED: CPT

## 2024-07-27 PROCEDURE — 99232 SBSQ HOSP IP/OBS MODERATE 35: CPT | Performed by: INTERNAL MEDICINE

## 2024-07-27 PROCEDURE — 6370000000 HC RX 637 (ALT 250 FOR IP): Performed by: PHYSICIAN ASSISTANT

## 2024-07-27 PROCEDURE — 2580000003 HC RX 258: Performed by: PHYSICIAN ASSISTANT

## 2024-07-27 PROCEDURE — 97162 PT EVAL MOD COMPLEX 30 MIN: CPT

## 2024-07-27 PROCEDURE — 97166 OT EVAL MOD COMPLEX 45 MIN: CPT

## 2024-07-27 PROCEDURE — 80048 BASIC METABOLIC PNL TOTAL CA: CPT

## 2024-07-27 PROCEDURE — 1200000003 HC TELEMETRY R&B

## 2024-07-27 RX ADMIN — AMLODIPINE BESYLATE 10 MG: 10 TABLET ORAL at 09:43

## 2024-07-27 RX ADMIN — FOLIC ACID 500 MCG: 1 TABLET ORAL at 09:41

## 2024-07-27 RX ADMIN — CARBIDOPA AND LEVODOPA 1 TABLET: 10; 100 TABLET ORAL at 09:44

## 2024-07-27 RX ADMIN — FLUTICASONE PROPIONATE 2 SPRAY: 50 SPRAY, METERED NASAL at 09:38

## 2024-07-27 RX ADMIN — OXYCODONE HYDROCHLORIDE AND ACETAMINOPHEN 1 TABLET: 5; 325 TABLET ORAL at 19:44

## 2024-07-27 RX ADMIN — ACETAMINOPHEN 650 MG: 325 TABLET ORAL at 18:01

## 2024-07-27 RX ADMIN — BUPROPION HYDROCHLORIDE 150 MG: 150 TABLET, EXTENDED RELEASE ORAL at 09:45

## 2024-07-27 RX ADMIN — MEGESTROL ACETATE 40 MG: 40 TABLET ORAL at 09:44

## 2024-07-27 RX ADMIN — SODIUM CHLORIDE, PRESERVATIVE FREE 10 ML: 5 INJECTION INTRAVENOUS at 09:40

## 2024-07-27 RX ADMIN — CLOPIDOGREL BISULFATE 75 MG: 75 TABLET ORAL at 09:42

## 2024-07-27 RX ADMIN — PRIMIDONE 50 MG: 50 TABLET ORAL at 09:44

## 2024-07-27 RX ADMIN — SENNOSIDES 8.6 MG: 8.6 TABLET, FILM COATED ORAL at 19:45

## 2024-07-27 RX ADMIN — ACETAMINOPHEN 650 MG: 325 TABLET ORAL at 09:41

## 2024-07-27 RX ADMIN — BUPROPION HYDROCHLORIDE 450 MG: 300 TABLET, EXTENDED RELEASE ORAL at 09:41

## 2024-07-27 RX ADMIN — CARBIDOPA AND LEVODOPA 1 TABLET: 10; 100 TABLET ORAL at 14:01

## 2024-07-27 RX ADMIN — SODIUM CHLORIDE, PRESERVATIVE FREE 10 ML: 5 INJECTION INTRAVENOUS at 19:45

## 2024-07-27 RX ADMIN — TRAZODONE HYDROCHLORIDE 100 MG: 100 TABLET ORAL at 19:44

## 2024-07-27 RX ADMIN — OXYCODONE HYDROCHLORIDE AND ACETAMINOPHEN 2 TABLET: 5; 325 TABLET ORAL at 04:15

## 2024-07-27 RX ADMIN — SEVELAMER CARBONATE 800 MG: 800 TABLET, FILM COATED ORAL at 18:01

## 2024-07-27 RX ADMIN — DOCUSATE SODIUM 100 MG: 100 CAPSULE, LIQUID FILLED ORAL at 19:45

## 2024-07-27 RX ADMIN — DOCUSATE SODIUM 100 MG: 100 CAPSULE, LIQUID FILLED ORAL at 09:43

## 2024-07-27 RX ADMIN — SERTRALINE 50 MG: 50 TABLET, FILM COATED ORAL at 09:44

## 2024-07-27 RX ADMIN — SEVELAMER CARBONATE 800 MG: 800 TABLET, FILM COATED ORAL at 09:45

## 2024-07-27 RX ADMIN — CARBIDOPA AND LEVODOPA 1 TABLET: 10; 100 TABLET ORAL at 19:44

## 2024-07-27 ASSESSMENT — PATIENT HEALTH QUESTIONNAIRE - PHQ9
SUM OF ALL RESPONSES TO PHQ QUESTIONS 1-9: 0
1. LITTLE INTEREST OR PLEASURE IN DOING THINGS: NOT AT ALL
SUM OF ALL RESPONSES TO PHQ QUESTIONS 1-9: 0
SUM OF ALL RESPONSES TO PHQ QUESTIONS 1-9: 0
2. FEELING DOWN, DEPRESSED OR HOPELESS: NOT AT ALL
SUM OF ALL RESPONSES TO PHQ QUESTIONS 1-9: 0
SUM OF ALL RESPONSES TO PHQ9 QUESTIONS 1 & 2: 0

## 2024-07-27 ASSESSMENT — PAIN SCALES - GENERAL
PAINLEVEL_OUTOF10: 0
PAINLEVEL_OUTOF10: 6
PAINLEVEL_OUTOF10: 9
PAINLEVEL_OUTOF10: 9
PAINLEVEL_OUTOF10: 6

## 2024-07-27 ASSESSMENT — PAIN SCALES - WONG BAKER: WONGBAKER_NUMERICALRESPONSE: NO HURT

## 2024-07-27 ASSESSMENT — PAIN DESCRIPTION - DESCRIPTORS
DESCRIPTORS: ACHING
DESCRIPTORS: ACHING

## 2024-07-27 ASSESSMENT — PAIN DESCRIPTION - LOCATION
LOCATION: HIP
LOCATION: HIP

## 2024-07-27 ASSESSMENT — PAIN DESCRIPTION - ORIENTATION
ORIENTATION: RIGHT
ORIENTATION: RIGHT

## 2024-07-27 NOTE — PROGRESS NOTES
Comprehensive Nutrition Assessment    Type and Reason for Visit:  Initial, Positive Nutrition Screen, Consult (oral nutrition supplement/ hemodialysis patient; weight loss, poor appetite/ intake)    Nutrition Recommendations/Plan:   Consider renal MVI  Consider liberalize diet to help optimize oral intake  ONS: Magic Cup TID  Suggest continue Megace for appetite stimulation      Malnutrition Assessment:  Malnutrition Status:  Severe malnutrition (07/27/24 1058)    Context:  Chronic Illness     Findings of the 6 clinical characteristics of malnutrition:  Energy Intake:  75% or less estimated energy requirements for 1 month or longer  Weight Loss:   (weight up from past admission, hx of weight loss)     Body Fat Loss:  Severe body fat loss Orbital, Triceps, Buccal region   Muscle Mass Loss:   (moderate) Temples (temporalis)  Fluid Accumulation:  Unable to assess (hemodialysis patient)     Strength:  Not Performed    Nutrition Assessment:     Pt. severely malnourished AEB criteria listed above.  At risk for further nutritional compromise r/t admit with hip fracture s/p fall at home, 7/26/24 right hip hemiarthroplasty, increased nutrient needs due to known losses with dialysis and underlying medical condition (Parkinson's disease, ESRD- HD, CHF, CVA, depression, HTN, HLD).       Nutrition Related Findings:    Pt. Report/Treatments/Miscellaneous: Patient seen with  and reports 3 meals daily with improving intake since admission last month, intake of 3-4 magic cup daily PTA and pleased to receive ONS with meals this admit  GI Status: BM 7/25  Pertinent Labs: Sodium 136, Potassium 4.2, BUN 32, Creatinine 3.7, glucose 173  Pertinent Meds: colace, folvite, megace 40mg daily, senokot, renvela     Wound Type: Surgical Incision (7/26/24 RIGHT HIP HEMIARTHROPLASTY; Buttock DTI)       Current Nutrition Intake & Therapies:    Average Meal Intake: 26-50%  Average Supplements Intake: %  ADULT ORAL NUTRITION

## 2024-07-27 NOTE — PLAN OF CARE
Problem: Discharge Planning  Goal: Discharge to home or other facility with appropriate resources  7/27/2024 1159 by Deloris Cruz RN  Outcome: Progressing  Flowsheets (Taken 7/27/2024 0915)  Discharge to home or other facility with appropriate resources: Identify barriers to discharge with patient and caregiver  7/27/2024 0229 by Jenifer Church RN  Outcome: Progressing  Flowsheets (Taken 7/27/2024 0229)  Discharge to home or other facility with appropriate resources:   Identify barriers to discharge with patient and caregiver   Arrange for needed discharge resources and transportation as appropriate   Identify discharge learning needs (meds, wound care, etc)     Problem: Pain  Goal: Verbalizes/displays adequate comfort level or baseline comfort level  7/27/2024 1159 by Deloris Cruz RN  Outcome: Progressing  Flowsheets (Taken 7/27/2024 0229 by Jenifer Church RN)  Verbalizes/displays adequate comfort level or baseline comfort level:   Encourage patient to monitor pain and request assistance   Assess pain using appropriate pain scale   Administer analgesics based on type and severity of pain and evaluate response   Implement non-pharmacological measures as appropriate and evaluate response  7/27/2024 0229 by Jenifer Church RN  Outcome: Progressing  Flowsheets (Taken 7/27/2024 0229)  Verbalizes/displays adequate comfort level or baseline comfort level:   Encourage patient to monitor pain and request assistance   Assess pain using appropriate pain scale   Administer analgesics based on type and severity of pain and evaluate response   Implement non-pharmacological measures as appropriate and evaluate response     Problem: Safety - Adult  Goal: Free from fall injury  7/27/2024 1159 by Deloris Cruz RN  Outcome: Progressing  Flowsheets (Taken 7/27/2024 0923)  Free From Fall Injury: Instruct family/caregiver on patient safety  7/27/2024 0229 by Jenifer Church RN  Outcome: Progressing  Flowsheets (Taken 7/27/2024  deterioration, or improvement   Collaborate with multidisciplinary team to address chronic and comorbid conditions and prevent exacerbation or deterioration     Problem: Nutrition Deficit:  Goal: Optimize nutritional status  7/27/2024 1159 by Deloris Crzu RN  Outcome: Progressing  7/27/2024 1059 by Tammy Patten, RD, LD  Flowsheets (Taken 7/27/2024 1059)  Nutrient intake appropriate for improving, restoring, or maintaining nutritional needs: Assess nutritional status and recommend course of action   Care plan reviewed with patient.  Patient verbalizes understanding of the plan of care and contribute to goal setting.

## 2024-07-27 NOTE — PROGRESS NOTES
[facility]  INPATIENT PHYSICAL THERAPY  EVALUATION  [facility] ORTHOPEDICS 7K - 7K-18/018-A    Time In: 0807  Time Out: 0843  Timed Code Treatment Minutes: 25 Minutes  Minutes: 36          Date: 2024  Patient Name: Deloris Watts,  Gender:  female        MRN: [id number]  : 1955  (68 y.o.)      Referring Practitioner: Mikayla Doll PA-C  Diagnosis: Closed displaced fracture of right femoral neck  Additional Pertinent Hx: Per ED HPI, \"Deloris Watts is a 68 y.o. female who presents to the emergency department from home, brought in by EMS for evaluation of fall, right hip pain.  Patient had unwitnessed fall at home patient accompanied by  who states that she is taking Plavix and aspirin for previous history of stroke.  Patient endorsing of right hip pain with right leg foreshortened, externally rotated.  Patient endorsing 9/10 severity pain from right hip.  Patient also noted to have dialysis port to right chest  states she last received dialysis yesterday.\"  Pt is now s/p RIGHT HIP HEMIARTHROPLASTY by Dr Mantilla on 24.     Restrictions/Precautions:  Restrictions/Precautions: Fall Risk, General Precautions, Weight Bearing, Surgical protocol  Right Lower Extremity Weight Bearing: Weight Bearing As Tolerated  Position Activity Restriction  Hip Precautions: No hip flexion > 90 degrees, No hip internal rotation, No hip external rotation, No ADduction    Subjective:  Chart Reviewed: Yes  Subjective: Pt resting in bed and agrees to therapy. Pt's spouse present and helpful with home and PLOF information. RN approved session.    General:     Vision: Impaired  Vision Exceptions: Wears glasses for reading  Hearing: Exceptions to WFL  Hearing Exceptions: Hard of hearing/hearing concerns       Pain: 1-2/10 at rest and increases with mobility : Pt did not rate the pain with mobility.    Vitals: Vitals not assessed per clinical judgement, see nursing flowsheet    Social/Functional

## 2024-07-27 NOTE — PROGRESS NOTES
Orthopaedic Progress Note      SUBJECTIVE     Ms. Watts is post op day # 1 R hip raheel    Seen at bedside this morning, working with therapy  Family bedside   no adverse events overnight  Pain well controlled, tolerating oral diet  Denies any numbness/paresthesia in operative extremity        OBJECTIVE      Physical    VITALS:  BP (!) 115/48   Pulse 68   Temp 98.6 °F (37 °C) (Oral)   Resp 17   Ht 1.524 m (5')   Wt 46.9 kg (103 lb 6.3 oz)   SpO2 92%   BMI 20.19 kg/m²   I/O last 3 completed shifts:  In: 1500 [P.O.:600; I.V.:300]  Out: 950 [Urine:200; Blood:150]    5/10 pain  Gen: alert and oriented to name  not to year, follows commands, to baseline   Head: normorcephalic, atraumatic  Resp: unlabored, room air  Pelvis: stable  RLE: prineo c/d/I, no drainage, no bandage saturation  Sensation to light touch intact  Gastroc TA EHL intact  Distal pulses palpable, warm well perfused  Calf supple nontender to palpation       Data  CBC:   Lab Results   Component Value Date/Time    WBC 19.0 2024 06:35 AM    HGB 10.8 2024 06:35 AM     2024 06:35 AM     BMP:    Lab Results   Component Value Date/Time     2024 06:35 AM    K 4.2 2024 06:35 AM    K 3.9 2024 05:02 AM    CL 99 2024 06:35 AM    CO2 23 2024 06:35 AM    BUN 32 2024 06:35 AM    CREATININE 3.7 2024 06:35 AM    CALCIUM 8.4 2024 06:35 AM    GLUCOSE 173 2024 06:35 AM    GLUCOSE 81 2017 08:50 AM     Uric Acid:  No components found for: \"URIC\"  PT/INR:    Lab Results   Component Value Date/Time    INR 0.85 2024 07:31 AM     Troponin:    Lab Results   Component Value Date/Time    TROPONINI <0.006 2013 08:24 PM     Urine Culture:  No components found for: \"CURINE\"      Current Inpatient Medications    Current Facility-Administered Medications: clopidogrel (PLAVIX) tablet 75 mg, 75 mg, Oral, Daily  HYDROmorphone (DILAUDID) injection 0.5 mg, 0.5 mg, IntraVENous, Q4H

## 2024-07-27 NOTE — PROGRESS NOTES
[facility]   Nephrology progress note  7/27/2024, 12:36 PM      Pt Name:    Deloris Watts  MRN:     [id number]     YOB: 1955  Admit Date:    7/25/2024  3:55 PM    Chief Complaint: Nephrology following for ESRD    Subjective:  Patient was seen and examined this morning  No chest pain or shortness of breath  Feels okay    Objective:  24HR INTAKE/OUTPUT:    Intake/Output Summary (Last 24 hours) at 7/27/2024 1236  Last data filed at 7/27/2024 0940  Gross per 24 hour   Intake 1210 ml   Output 700 ml   Net 510 ml         I/O last 3 completed shifts:  In: 1500 [P.O.:600; I.V.:300]  Out: 950 [Urine:200; Blood:150]  I/O this shift:  In: 10 [I.V.:10]  Out: -    Admission weight: 46.7 kg (103 lb)  Wt Readings from Last 3 Encounters:   07/26/24 46.9 kg (103 lb 6.3 oz)   07/10/24 46.6 kg (102 lb 11.8 oz)   06/26/24 47.2 kg (104 lb)        Vitals :   Vitals:    07/26/24 2344 07/27/24 0405 07/27/24 0414 07/27/24 0915   BP: 94/75 (!) 115/48  113/71   Pulse: 97 68  99   Resp: 18 17  18   Temp: 98.8 °F (37.1 °C) 98.6 °F (37 °C)  98.1 °F (36.7 °C)   TempSrc: Oral Oral  Oral   SpO2: 98% (!) 88% 92% 95%   Weight:       Height:           Physical examination  General Appearance: alert and cooperative with exam, appears comfortable, no distress  Mouth/Throat: Oral mucosa moist  Neck: No JVD noted  Lungs: no use of accessory muscles  Neuro: Awake alert  Psych: Not agitated    Medications:  Infusion:    sodium chloride       Meds:    clopidogrel  75 mg Oral Daily    buPROPion  150 mg Oral QAM    buPROPion  300 mg Oral Daily    carbidopa-levodopa  1 tablet Oral TID    docusate sodium  100 mg Oral BID    fluticasone  2 spray Each Nostril Daily    folic acid  500 mcg Oral Daily    megestrol  40 mg Oral Daily    primidone  50 mg Oral Daily    senna  1 tablet Oral Nightly    sevelamer  800 mg Oral BID with meals    traZODone  100 mg Oral Nightly    sertraline  50 mg Oral Daily    amLODIPine  10 mg Oral Daily     sodium chloride flush  5-40 mL IntraVENous 2 times per day     Meds prn: sodium chloride flush, sodium chloride, ondansetron **OR** ondansetron, polyethylene glycol, acetaminophen **OR** acetaminophen, oxyCODONE-acetaminophen **OR** oxyCODONE-acetaminophen     Lab Data :  CBC:   Recent Labs     07/25/24  1627 07/26/24  0638 07/27/24  0635   WBC 9.2 12.5* 19.0*   HGB 14.5 14.7 10.8*   HCT 47.6* 47.3* 34.6*    239 186     CMP:  Recent Labs     07/25/24  1627 07/26/24  0638 07/27/24  0635    139 136   K 4.2 4.6 4.2   CL 98 98 99   CO2 25 22* 23   BUN 34* 41* 32*   CREATININE 4.4* 4.9* 3.7*   GLUCOSE 80 99 173*   CALCIUM 9.0 9.3 8.4*     Hepatic:   Recent Labs     07/25/24  1627   AST 19   ALT <5*   BILITOT 0.4   ALKPHOS 116         Assessment and Plan:  ESRD  Status post hemodialysis yesterday  Continue dialysis Mondays, Wednesdays and Fridays  Metabolic acidosis.  Resolved  Anemia in ESRD.  Hemoglobin stable.  Will monitor  Leukocytosis  Status post fall  Femoral neck fracture status post surgery      D/W patient and family member    Vasiliy Barnard MD  [facility]    This report has been created using voice recognition software. It may contain minor errors which are inherent in voice recognition technology

## 2024-07-27 NOTE — PROGRESS NOTES
Brief Intervention and Referral to Treatment Summary    Patient was provided PHQ-9, AUDIT-C and DAST Screening:      PHQ-9 Score: 0  AUDIT-C Score:  0  DAST Score:  0    Patient’s substance use is considered     Low Risk/Healthy      Patient’s depression is considered:     Minimal    Brief Education Was Provided    Patient was receptive      Brief Intervention Is Provided (Only for AUDIT-C or DAST)     Patient reports readiness to decrease and/or stop use and a plan was discussed   Patient denies readiness to decrease and/or stop use and a plan was not discussed      Injured Trauma Survivor Screening  1.  When you were injured or right afterward   Did you think you were going to die? RESPONSES; YES+1/NO: NO  Do you think this was done to you intentionally? NO    Since your injury  Have you felt more restless, tense or jumpy than usual? RESPONSES; YES+1/NO: NO  Have you found yourself unable to stop worrying? RESPONSES; YES+1/NO: YES  +1  Do you find yourself thinking that the world is unsafe and that people are not to be trusted? RESPONSES; YES+1/NO: NO    TOTAL SCORE from ITSS Questions 1 and 2: 1  NOTE: A score of greater than or equal to 2 is considered positive for PTSD risk and is to receive a community resource packet to link with appropriate providers.    Recommendations/Referrals for Brief and/or Specialized Treatment Provided to Patient:  Pt scored a 1

## 2024-07-27 NOTE — PLAN OF CARE
Problem: Discharge Planning  Goal: Discharge to home or other facility with appropriate resources  7/27/2024 0229 by Jenifer Church RN  Outcome: Progressing  Flowsheets (Taken 7/27/2024 0229)  Discharge to home or other facility with appropriate resources:   Identify barriers to discharge with patient and caregiver   Arrange for needed discharge resources and transportation as appropriate   Identify discharge learning needs (meds, wound care, etc)     Problem: Pain  Goal: Verbalizes/displays adequate comfort level or baseline comfort level  Outcome: Progressing  Flowsheets (Taken 7/27/2024 0229)  Verbalizes/displays adequate comfort level or baseline comfort level:   Encourage patient to monitor pain and request assistance   Assess pain using appropriate pain scale   Administer analgesics based on type and severity of pain and evaluate response   Implement non-pharmacological measures as appropriate and evaluate response     Problem: Safety - Adult  Goal: Free from fall injury  Outcome: Progressing  Flowsheets (Taken 7/27/2024 0229)  Free From Fall Injury: Instruct family/caregiver on patient safety     Problem: ABCDS Injury Assessment  Goal: Absence of physical injury  Outcome: Progressing  Flowsheets (Taken 7/27/2024 0229)  Absence of Physical Injury: Implement safety measures based on patient assessment     Problem: Skin/Tissue Integrity  Goal: Absence of new skin breakdown  Description: 1.  Monitor for areas of redness and/or skin breakdown  2.  Assess vascular access sites hourly  3.  Every 4-6 hours minimum:  Change oxygen saturation probe site  4.  Every 4-6 hours:  If on nasal continuous positive airway pressure, respiratory therapy assess nares and determine need for appliance change or resting period.  Outcome: Progressing     Problem: Confusion  Goal: Confusion, delirium, dementia, or psychosis is improved or at baseline  Description: INTERVENTIONS:  1. Assess for possible contributors to thought

## 2024-07-27 NOTE — PROGRESS NOTES
today.  Patient denied any reports of shortness of breath, nausea, vomiting, chest pain or lightheadedness.    HPI / Hospital Course:  Per HPI  \"Deloris Watts is a 68 y.o. female with PMHx of prior CVA on Plavix, end-stage renal disease on HD, chronic debility with hemiplegia/hemiparesis affecting the left side since 1990s when patient had CVA, Parkinson disease on Sinemet, Hx small left subdural hematoma who presents to [facility] for evaluation of left leg pain following a ground-level fall at home.  This was an unwitnessed fall at home.  Patient states she was walking into her bathroom where she slipped and fell.  Patient did not lose consciousness or hit her head.  Patient denies any symptoms of dizziness or lightheadedness prior to falling.  No palpitation shortness of breath chest pain prior as well.  Patient states that she simply fell.  Patient landed on her right hip which she had acute onset right hip pain.  And it is documented patient is right leg was shortened and externally rotated.  Patient was seen and evaluated in the emergency department, states her pain is controlled at this time.  Denies fever, chills, shortness of breath, difficulty breathing chest pain abdominal pain nausea or vomiting.  No prior history of coronary artery disease.  Patient did have an echocardiogram spring 2023 which showed EF 60% with no wall motion abnormalities.  Patient had left hip hemiarthroplasty in March 2024 where she tolerated surgery.  Patient has been to inpatient rehab several times in 2024 and during this period time patient is able to tolerate the continuous therapy with no symptoms of chest pain or anginal equivalents.  Patient has a greater than 4 METS.  RSRI score 3 with a point prior CVA and elevated creatinine.  Patient has no further questions at this time.  She denies fever, chills, shortness of breath, difficulty breathing, chest pain, palpitations, pleuritic chest pain, cough, abdominal    Skin: Warm and dry. No rashes or lesions.  Incision right hip clean and dry.  Neurologic:  No focal sensory/motor deficits in the upper or lower extremities.   Psychiatric: Alert and oriented, normal insight and thought content.   Peripheral Pulses: +2 palpable, equal bilaterally.       Labs/Radiology: See chart or assessment above.     Electronically signed by Jimmy Gamboa MD on 7/27/2024 at 6:41 PM  Case was discussed with Attending, Dr. Viera.      This report has been created using voice recognition software. It may contain minor errors which are inherent in voice recognition technology.

## 2024-07-27 NOTE — PROGRESS NOTES
[facility]  INPATIENT OCCUPATIONAL THERAPY  [facility] 7K  EVALUATION    Time:   Time In: 1125  Time Out: 1150  Timed Code Treatment Minutes: 15 Minutes  Minutes: 25          Date: 2024  Patient Name: Deloris Watts,   Gender: female      MRN: [id number]  : 1955  (68 y.o.)  Referring Practitioner: Mikayla Doll PA-C  Diagnosis: fall initial encounter  Additional Pertinent Hx: Per EMR: Deloris Watts is a 68 y.o. female with PMHx of prior CVA on Plavix, end-stage renal disease on HD, chronic debility with hemiplegia/hemiparesis affecting the left side since  when patient had CVA, Parkinson disease on Sinemet, Hx small left subdural hematoma who presents to [facility] for evaluation of left leg pain following a ground-level fall at home.  This was an unwitnessed fall at home.  Patient states she was walking into her bathroom where she slipped and fell.  X-ray right hip/pelvis was obtained showed acute fracture in the right femoral neck with diffuse osteopenia as well as a previous left hip replacement. Pt s/p Open treatment right femoral neck fracture with a prosthesis on 24    Restrictions/Precautions:  Restrictions/Precautions: Fall Risk, General Precautions, Weight Bearing, Surgical protocol  Right Lower Extremity Weight Bearing: Weight Bearing As Tolerated  Position Activity Restriction  Hip Precautions: No hip flexion > 90 degrees, No hip internal rotation, No hip external rotation, No ADduction    Subjective  Chart Reviewed: Yes, Orders, Progress Notes, History and Physical  Patient assessed for rehabilitation services?: Yes  Family / Caregiver Present: No    Subjective: Pt reclined in bedside chair upon arrival, agreeable to OT session. Pt pleasantly confused, frequently stating \"I don't know why this hurts so bad. It wasn't this bad yesterday.\"    Pain: Pt c/o increased pain in R hip with increased activity, ice applied at end of session.  of data and development of plan of care and goals.  Treatment time included skilled education and facilitation of tasks to increase safety and independence with ADL's for improved functional independence and quality of life.    Discharge Recommendations:  Subacute/Skilled Nursing Facility (not safe to return home)    Patient Education:          Patient Education  Education Given To: Patient  Education Provided: Role of Therapy;Plan of Care;Precautions;Orientation (transfer training)  Education Method: Demonstration;Verbal  Barriers to Learning: Cognition  Education Outcome: Verbalized understanding;Continued education needed    Equipment Recommendations:  Equipment Needed: No  Other: defer to next level of care    Plan:  Times Per Week: 5-6x  Current Treatment Recommendations: Strengthening, Balance training, Functional mobility training, Endurance training, Cognitive reorientation, Safety education & training, Patient/Caregiver education & training, Equipment evaluation, education, & procurement, Self-Care / ADL.  See long-term goal time frame for expected duration of plan of care.  If no long-term goals established, a short length of stay is anticipated.    Goals:     Short Term Goals  Time Frame for Short Term Goals: by discharge  Short Term Goal 1: Pt will increase activity tolerance for functional mobility to/from BSC or chair with CGA and minimal cues for safety/sequencing in prep for ADL completion.  Short Term Goal 2: Pt will complete LB ADL tasks with moderate assistance and LHAE to increase independence with self care tasks.  Short Term Goal 3: Pt will tolerate dynamic standing X 2 minutes with CGA and unilateral release in prep for sinkside grooming tasks.  Short Term Goal 4: Pt will complete functional transfers with CGA and minimal cues for hip precautions in prep for BSC transfers  Long Term Goals  Time Frame for Long Term Goals : not set due to ELOS    AM-PAC Inpatient Daily Activity Raw Score:

## 2024-07-28 PROBLEM — Z96.649 S/P HIP HEMIARTHROPLASTY: Status: ACTIVE | Noted: 2024-07-28

## 2024-07-28 LAB
ANION GAP SERPL CALC-SCNC: 17 MEQ/L (ref 8–16)
BUN SERPL-MCNC: 53 MG/DL (ref 7–22)
CA-I BLD ISE-SCNC: 1.09 MMOL/L (ref 1.12–1.32)
CALCIUM SERPL-MCNC: 8.3 MG/DL (ref 8.5–10.5)
CHLORIDE SERPL-SCNC: 96 MEQ/L (ref 98–111)
CO2 SERPL-SCNC: 21 MEQ/L (ref 23–33)
CREAT SERPL-MCNC: 5.7 MG/DL (ref 0.4–1.2)
DEPRECATED RDW RBC AUTO: 61.2 FL (ref 35–45)
ERYTHROCYTE [DISTWIDTH] IN BLOOD BY AUTOMATED COUNT: 16.6 % (ref 11.5–14.5)
GFR SERPL CREATININE-BSD FRML MDRD: 7 ML/MIN/1.73M2
GLUCOSE SERPL-MCNC: 105 MG/DL (ref 70–108)
HCT VFR BLD AUTO: 32 % (ref 37–47)
HGB BLD-MCNC: 9.8 GM/DL (ref 12–16)
MCH RBC QN AUTO: 30.4 PG (ref 26–33)
MCHC RBC AUTO-ENTMCNC: 30.6 GM/DL (ref 32.2–35.5)
MCV RBC AUTO: 99.4 FL (ref 81–99)
PLATELET # BLD AUTO: 176 THOU/MM3 (ref 130–400)
PMV BLD AUTO: 11.9 FL (ref 9.4–12.4)
POTASSIUM SERPL-SCNC: 4.1 MEQ/L (ref 3.5–5.2)
RBC # BLD AUTO: 3.22 MILL/MM3 (ref 4.2–5.4)
SODIUM SERPL-SCNC: 134 MEQ/L (ref 135–145)
WBC # BLD AUTO: 11.9 THOU/MM3 (ref 4.8–10.8)

## 2024-07-28 PROCEDURE — 6370000000 HC RX 637 (ALT 250 FOR IP): Performed by: PHYSICIAN ASSISTANT

## 2024-07-28 PROCEDURE — 36415 COLL VENOUS BLD VENIPUNCTURE: CPT

## 2024-07-28 PROCEDURE — 80048 BASIC METABOLIC PNL TOTAL CA: CPT

## 2024-07-28 PROCEDURE — 2500000003 HC RX 250 WO HCPCS

## 2024-07-28 PROCEDURE — 99232 SBSQ HOSP IP/OBS MODERATE 35: CPT | Performed by: INTERNAL MEDICINE

## 2024-07-28 PROCEDURE — 2580000003 HC RX 258: Performed by: PHYSICIAN ASSISTANT

## 2024-07-28 PROCEDURE — 1200000003 HC TELEMETRY R&B

## 2024-07-28 PROCEDURE — 85027 COMPLETE CBC AUTOMATED: CPT

## 2024-07-28 PROCEDURE — 82330 ASSAY OF CALCIUM: CPT

## 2024-07-28 PROCEDURE — 99222 1ST HOSP IP/OBS MODERATE 55: CPT | Performed by: PHYSICAL MEDICINE & REHABILITATION

## 2024-07-28 RX ORDER — CALCIUM GLUCONATE 20 MG/ML
2000 INJECTION, SOLUTION INTRAVENOUS ONCE
Status: COMPLETED | OUTPATIENT
Start: 2024-07-28 | End: 2024-07-28

## 2024-07-28 RX ADMIN — PRIMIDONE 50 MG: 50 TABLET ORAL at 08:56

## 2024-07-28 RX ADMIN — SODIUM CHLORIDE, PRESERVATIVE FREE 10 ML: 5 INJECTION INTRAVENOUS at 08:56

## 2024-07-28 RX ADMIN — FLUTICASONE PROPIONATE 2 SPRAY: 50 SPRAY, METERED NASAL at 09:00

## 2024-07-28 RX ADMIN — DOCUSATE SODIUM 100 MG: 100 CAPSULE, LIQUID FILLED ORAL at 08:56

## 2024-07-28 RX ADMIN — OXYCODONE HYDROCHLORIDE AND ACETAMINOPHEN 1 TABLET: 5; 325 TABLET ORAL at 20:24

## 2024-07-28 RX ADMIN — SENNOSIDES 8.6 MG: 8.6 TABLET, FILM COATED ORAL at 20:24

## 2024-07-28 RX ADMIN — CARBIDOPA AND LEVODOPA 1 TABLET: 10; 100 TABLET ORAL at 13:45

## 2024-07-28 RX ADMIN — MEGESTROL ACETATE 40 MG: 40 TABLET ORAL at 08:55

## 2024-07-28 RX ADMIN — SERTRALINE 50 MG: 50 TABLET, FILM COATED ORAL at 08:56

## 2024-07-28 RX ADMIN — CARBIDOPA AND LEVODOPA 1 TABLET: 10; 100 TABLET ORAL at 20:24

## 2024-07-28 RX ADMIN — SEVELAMER CARBONATE 800 MG: 800 TABLET, FILM COATED ORAL at 08:56

## 2024-07-28 RX ADMIN — OXYCODONE HYDROCHLORIDE AND ACETAMINOPHEN 1 TABLET: 5; 325 TABLET ORAL at 03:35

## 2024-07-28 RX ADMIN — BUPROPION HYDROCHLORIDE 150 MG: 150 TABLET, EXTENDED RELEASE ORAL at 08:56

## 2024-07-28 RX ADMIN — CLOPIDOGREL BISULFATE 75 MG: 75 TABLET ORAL at 08:56

## 2024-07-28 RX ADMIN — CALCIUM GLUCONATE 2000 MG: 20 INJECTION, SOLUTION INTRAVENOUS at 11:02

## 2024-07-28 RX ADMIN — DOCUSATE SODIUM 100 MG: 100 CAPSULE, LIQUID FILLED ORAL at 20:24

## 2024-07-28 RX ADMIN — CARBIDOPA AND LEVODOPA 1 TABLET: 10; 100 TABLET ORAL at 08:56

## 2024-07-28 RX ADMIN — TRAZODONE HYDROCHLORIDE 100 MG: 100 TABLET ORAL at 20:25

## 2024-07-28 RX ADMIN — AMLODIPINE BESYLATE 10 MG: 10 TABLET ORAL at 08:56

## 2024-07-28 RX ADMIN — BUPROPION HYDROCHLORIDE 300 MG: 300 TABLET, EXTENDED RELEASE ORAL at 09:00

## 2024-07-28 RX ADMIN — FOLIC ACID 500 MCG: 1 TABLET ORAL at 08:55

## 2024-07-28 RX ADMIN — SEVELAMER CARBONATE 800 MG: 800 TABLET, FILM COATED ORAL at 19:24

## 2024-07-28 RX ADMIN — SODIUM CHLORIDE, PRESERVATIVE FREE 10 ML: 5 INJECTION INTRAVENOUS at 20:24

## 2024-07-28 ASSESSMENT — PAIN DESCRIPTION - DESCRIPTORS
DESCRIPTORS: ACHING
DESCRIPTORS: ACHING

## 2024-07-28 ASSESSMENT — PAIN SCALES - GENERAL
PAINLEVEL_OUTOF10: 6
PAINLEVEL_OUTOF10: 6

## 2024-07-28 ASSESSMENT — PAIN DESCRIPTION - ORIENTATION
ORIENTATION: RIGHT
ORIENTATION: RIGHT

## 2024-07-28 ASSESSMENT — PAIN DESCRIPTION - LOCATION
LOCATION: HIP
LOCATION: HIP

## 2024-07-28 NOTE — PROGRESS NOTES
Hospitalist Progress Note  Internal Medicine Resident      Patient: Deloris Watts 68 y.o. female      Unit/Bed: 7-18/018-A    Admit Date: 7/25/2024      ASSESSMENT AND PLAN  Active Problems  Right femoral neck fracture following mechanical fall status post open treatment of right femoral neck fracture with prosthesis on 7/26: 7/25 x-ray of bilateral hips showed acute fracture of right femoral neck with diffuse osteopenia and prior left hip replacement.  Fall precautions  PT/OT  Physiatry consult placed 7/28.  Will need to contact 7/29 after weekend.  ESRD on HD MWF: Home medication: Sevelamer  Nephrology following: Continue HD.  Electrolytes stable.  Continue sevelamer 800 mg twice daily  Physical deconditioning: Recent mechanical falls.  PT/OT following.  Patient to be admitted to [facility].  Leukocytosis: Improved.  Likely reactive with recent surgery.  7/27 WBC 19.9 with no noted fever.  Monitor CBC.  7/28 WBC 11.9.  Chronic macrocytic anemia: Baseline hemoglobin 10 with .  Likely secondary to ESRD.  5/6/2024 vitamin B12 363, folate greater than 20.  Home medication: Folic acid.   Monitor CBC  Continue folic acid 500 mcg daily  Hypertension: Home medication: Amlodipine  Continue amlodipine 10 mg daily  Parkinson's disease: Home medication: Sinemet  Continue Sinemet  mg 3 times daily  Depression/anxiety: Home medication: Wellbutrin, Zoloft  Continue Wellbutrin 450 mg daily  Continue Zoloft 50 mg daily  Hyperlipidemia: Home medication: Simvastatin  History of left parafalcine subdural hematoma: Home medication: Keppra, primidone  Continue primidone 50 mg daily  History of ischemic CVA: Home medication: Plavix, ASA, simvastatin  Continue Plavix 75 mg daily  Metabolic acidosis: Resolved      LDA: [x]CVC / []PICC / []Midline / []Smith / []Drains / []Mediport / []None  Antibiotics: None  Steroids: None  Labs (still needed?): [x]Yes / []No  IVF (still needed?): []Yes / [x]No    Level of care: []Step Down /

## 2024-07-28 NOTE — PLAN OF CARE
Problem: Discharge Planning  Goal: Discharge to home or other facility with appropriate resources  7/27/2024 2242 by Jenifer Church RN  Outcome: Progressing  Flowsheets (Taken 7/27/2024 2242)  Discharge to home or other facility with appropriate resources:   Identify barriers to discharge with patient and caregiver   Arrange for needed discharge resources and transportation as appropriate   Identify discharge learning needs (meds, wound care, etc)     Problem: Pain  Goal: Verbalizes/displays adequate comfort level or baseline comfort level  7/27/2024 2242 by Jenifer Church RN  Outcome: Progressing  Flowsheets (Taken 7/27/2024 2242)  Verbalizes/displays adequate comfort level or baseline comfort level:   Encourage patient to monitor pain and request assistance   Assess pain using appropriate pain scale   Administer analgesics based on type and severity of pain and evaluate response   Implement non-pharmacological measures as appropriate and evaluate response     Problem: Safety - Adult  Goal: Free from fall injury  7/27/2024 2242 by Jenifer Church, RN  Outcome: Progressing  Flowsheets (Taken 7/27/2024 2242)  Free From Fall Injury: Instruct family/caregiver on patient safety     Problem: ABCDS Injury Assessment  Goal: Absence of physical injury  7/27/2024 2242 by Jenifer Church, RN  Outcome: Progressing  Flowsheets (Taken 7/27/2024 2242)  Absence of Physical Injury: Implement safety measures based on patient assessment     Problem: Skin/Tissue Integrity  Goal: Absence of new skin breakdown  Description: 1.  Monitor for areas of redness and/or skin breakdown  2.  Assess vascular access sites hourly  3.  Every 4-6 hours minimum:  Change oxygen saturation probe site  4.  Every 4-6 hours:  If on nasal continuous positive airway pressure, respiratory therapy assess nares and determine need for appliance change or resting period.  7/27/2024 2242 by Jenifer Church, RN  Outcome: Progressing     Problem: Confusion  Goal:  Confusion, delirium, dementia, or psychosis is improved or at baseline  Description: INTERVENTIONS:  1. Assess for possible contributors to thought disturbance, including medications, impaired vision or hearing, underlying metabolic abnormalities, dehydration, psychiatric diagnoses, and notify attending LIP  2. [place] high risk fall precautions, as indicated  3. Provide frequent short contacts to provide reality reorientation, refocusing and direction  4. Decrease environmental stimuli, including noise as appropriate  5. Monitor and intervene to maintain adequate nutrition, hydration, elimination, sleep and activity  6. If unable to ensure safety without constant attention obtain sitter and review sitter guidelines with assigned personnel  7. Initiate Psychosocial CNS and Spiritual Care consult, as indicated  7/27/2024 2242 by Jenifer Church, RN  Outcome: Progressing  Flowsheets (Taken 7/27/2024 2242)  Effect of thought disturbance (confusion, delirium, dementia, or psychosis) are managed with adequate functional status:   Assess for contributors to thought disturbance, including medications, impaired vision or hearing, underlying metabolic abnormalities, dehydration, psychiatric diagnoses, notify LIP   [place] high risk fall precautions, as indicated   Provide frequent short contacts to provide reality reorientation, refocusing and direction   Decrease environmental stimuli, including noise as appropriate     Problem: Chronic Conditions and Co-morbidities  Goal: Patient's chronic conditions and co-morbidity symptoms are monitored and maintained or improved  7/27/2024 2242 by Jenifer Church, RN  Outcome: Progressing  Flowsheets (Taken 7/27/2024 2242)  Care Plan - Patient's Chronic Conditions and Co-Morbidity Symptoms are Monitored and Maintained or Improved:   Monitor and assess patient's chronic conditions and comorbid symptoms for stability, deterioration, or improvement   Collaborate with multidisciplinary

## 2024-07-28 NOTE — PROGRESS NOTES
[facility]   Nephrology progress note  7/28/2024, 1:51 PM      Pt Name:    Deloris Watts  MRN:     [id number]     YOB: 1955  Admit Date:    7/25/2024  3:55 PM    Chief Complaint: Nephrology following for ESRD    Subjective:  Patient was seen and examined this morning  Comfortable  No distress    Objective:  24HR INTAKE/OUTPUT:    Intake/Output Summary (Last 24 hours) at 7/28/2024 1351  Last data filed at 7/28/2024 1255  Gross per 24 hour   Intake 1130 ml   Output 0 ml   Net 1130 ml           I/O last 3 completed shifts:  In: 1660 [P.O.:1650; I.V.:10]  Out: 100 [Urine:100]  I/O this shift:  In: 300 [P.O.:300]  Out: -    Admission weight: 46.7 kg (103 lb)  Wt Readings from Last 3 Encounters:   07/26/24 46.9 kg (103 lb 6.3 oz)   07/10/24 46.6 kg (102 lb 11.8 oz)   06/26/24 47.2 kg (104 lb)        Vitals :   Vitals:    07/27/24 2310 07/28/24 0332 07/28/24 0845 07/28/24 1330   BP: (!) 126/51 134/60 (!) 132/59 (!) 145/64   Pulse: 92 96 91 95   Resp: 16 15 16    Temp: 98.2 °F (36.8 °C) 97.7 °F (36.5 °C)     TempSrc: Oral Oral     SpO2: 94% 92% 93%    Weight:       Height:           Physical examination  General Appearance:  appears comfortable, no distress  Neck: No JVD noted  Lungs: no use of accessory muscles, poor effort  Heart: S1-S2  Psych: Not agitated    Medications:  Infusion:    sodium chloride       Meds:    clopidogrel  75 mg Oral Daily    buPROPion  150 mg Oral QAM    buPROPion  300 mg Oral Daily    carbidopa-levodopa  1 tablet Oral TID    docusate sodium  100 mg Oral BID    fluticasone  2 spray Each Nostril Daily    folic acid  500 mcg Oral Daily    megestrol  40 mg Oral Daily    primidone  50 mg Oral Daily    senna  1 tablet Oral Nightly    sevelamer  800 mg Oral BID with meals    traZODone  100 mg Oral Nightly    sertraline  50 mg Oral Daily    amLODIPine  10 mg Oral Daily    sodium chloride flush  5-40 mL IntraVENous 2 times per day     Meds prn: sodium chloride flush,

## 2024-07-28 NOTE — CONSULTS
and landed on her right side hitting her right hip.  She immediately feel pain at her right hip and was unable to get up.  She told her  that she did not hit her head.  She did not lose consciousness at the time.  Her  called 911 and the patient was brought to [facility] ER by EMS for evaluation.  X-ray of right hip and pelvis done on 7/25/2024 revealed acute right femoral neck fracture with diffuse osteopenia.  She was admitted.  Orthopedics was consulted on 7/26/2024 and surgical intervention was planned.  Nephrology service was also consulted to continue patient's hemodialysis 3 times weekly on Monday, Wednesday and Friday.  The patient then underwent open treatment of right femoral neck fracture with prosthesis by Dr. Ebenezer Mantilla on 7/26/2024.  She is allowed to have weightbearing as tolerated on right lower extremity postoperatively with total hip arthroplasty protocol.    At the present time the patient still complains of sharp pain at her right hip especially when she move her right hip or bear weight to walk.  She rates the pain intensity up to 8/10 level.  She says her bilateral lower extremities feel weak especially at the right side.  She denies having any numbness or tingling sensation.  She denies having headache, dizziness, lightheadedness, visual disturbance, shortness of breath, chest pain, abdominal pain, dysuria.  She says she felt nauseous yesterday but not today.  Her  says the patient still had diarrhea.  The patient states that she feels anxious.      Most Recent Rehabilitation Assessments:  PT:    (7/27/2024) :  Timed Code Treatment Minutes: 25 Minutes   Activity Tolerance:  Patient tolerance of  treatment:Good.     Balance:  Static Sitting Balance:  Stand By Assistance  Dynamic Sitting Balance: Stand By Assistance  Static Standing Balance: Contact Guard Assistance  Dynamic Standing Balance: Contact Guard Assistance, Minimal Assistance     Bed  verbal cues , and with increased time for completion.   Distance:  bedside chair>EOB  Unsteady, quickly fatigues. Short/shuffling steps. Cues for posture and safety with RW.         ST: Not requested      Past Medical History:        Diagnosis Date    Allergic rhinitis     Anemia in CKD (chronic kidney disease)     Anxiety     CHF (congestive heart failure) (HCC)     Closed fracture of right proximal humerus 09/18/2021    ORIF    Closed right ankle fracture     In 1990s; treated with casting    CVA (cerebral infarction)     in 1990s ; with left-sided weakness    Depression     Fibromyalgia     in 1990s    Headache(784.0)     Hemiplegia and hemiparesis following cerebral infarction affecting left non-dominant side (HCC)     in 1990s    Hydronephrosis 09/01/2023    Urogenital Implants, calculus of ureter, UTI    Hyperlipidemia     Hypertension     in 1980s    Irritable bowel syndrome     in 1990s    Kidney failure     Chronic Kidney Disease, Renal Dialysis started in 1/2023    Osteoporosis 2019    Unspecified cerebral artery occlusion with cerebral infarction     Unspecified sleep apnea     Wrist fracture, right 2018    Treated with casting       Past Surgical History:        Procedure Laterality Date    CARPAL TUNNEL RELEASE Right     in 1990s    COLONOSCOPY  [address] [place] [address]    CT BIOPSY RENAL  12/28/2022    CT BIOPSY RENAL 12/28/2022 [facility] CT SCAN    CYSTOSCOPY Left 08/29/2023    CYSTOSCOPY, LEFT URETERAL STENT PLACEMENT performed by Edilson Whalen MD at [facility] OR    ENDOSCOPY, COLON, DIAGNOSTIC  unsure    HEMORRHOID SURGERY  unsure    HIP SURGERY Left 03/11/2024    Left hemiarthroplasty performed by Virgilio Lezama DO at [facility] OR    HUMERUS FRACTURE SURGERY Right 2021    ORIF    HYSTERECTOMY (CERVIX STATUS UNKNOWN)      age 38    LASIK      lasik    NECK SURGERY  18  years ago    cervical spine fusion ; in 1990s    OVARY REMOVAL Bilateral     age 38    SINUS SURGERY  10 years ago    SPINE SURGERY N/A 12/18/2023

## 2024-07-28 NOTE — PROGRESS NOTES
Orthopaedic Progress Note      SUBJECTIVE     Ms. Watts is post op day # 2 R hip raheel    Seen at bedside this morning  Good effort therapies yesterday  No family bedside  no adverse events overnight  Pain well controlled, tolerating oral diet  Denies any numbness/paresthesia in operative extremity        OBJECTIVE      Physical    VITALS:  BP (!) 132/59   Pulse 91   Temp 97.7 °F (36.5 °C) (Oral)   Resp 16   Ht 1.524 m (5')   Wt 46.9 kg (103 lb 6.3 oz)   SpO2 93%   BMI 20.19 kg/m²   I/O last 3 completed shifts:  In: 1660 [P.O.:1650; I.V.:10]  Out: 100 [Urine:100]    4/10 pain  Gen: alert and oriented to name  not to year, follows commands, to baseline   Head: normorcephalic, atraumatic  Resp: unlabored, room air  Pelvis: stable  RLE: prineo c/d/I, no drainage, no bandage saturation  Sensation to light touch intact  Gastroc TA EHL intact  Distal pulses palpable, warm well perfused  Calf supple nontender to palpation       Data  CBC:   Lab Results   Component Value Date/Time    WBC 11.9 2024 06:23 AM    HGB 9.8 2024 06:23 AM     2024 06:23 AM     BMP:    Lab Results   Component Value Date/Time     2024 06:23 AM    K 4.1 2024 06:23 AM    K 3.9 2024 05:02 AM    CL 96 2024 06:23 AM    CO2 21 2024 06:23 AM    BUN 53 2024 06:23 AM    CREATININE 5.7 2024 06:23 AM    CALCIUM 8.3 2024 06:23 AM    GLUCOSE 105 2024 06:23 AM    GLUCOSE 81 2017 08:50 AM     Uric Acid:  No components found for: \"URIC\"  PT/INR:    Lab Results   Component Value Date/Time    INR 0.85 2024 07:31 AM     Troponin:    Lab Results   Component Value Date/Time    TROPONINI <0.006 2013 08:24 PM     Urine Culture:  No components found for: \"CURINE\"      Current Inpatient Medications    Current Facility-Administered Medications: calcium gluconate 2,000 mg in sodium chloride 100 mL, 2,000 mg, IntraVENous, Once  clopidogrel (PLAVIX) tablet 75 mg, 75

## 2024-07-28 NOTE — PLAN OF CARE
Progressing  Flowsheets (Taken 7/27/2024 2242)  Absence of Physical Injury: Implement safety measures based on patient assessment     Problem: Skin/Tissue Integrity  Goal: Absence of new skin breakdown  Description: 1.  Monitor for areas of redness and/or skin breakdown  2.  Assess vascular access sites hourly  3.  Every 4-6 hours minimum:  Change oxygen saturation probe site  4.  Every 4-6 hours:  If on nasal continuous positive airway pressure, respiratory therapy assess nares and determine need for appliance change or resting period.  7/28/2024 0941 by Ashanti Booth, RN  Outcome: Progressing  7/27/2024 2242 by Jenifer Church RN  Outcome: Progressing     Problem: Confusion  Goal: Confusion, delirium, dementia, or psychosis is improved or at baseline  Description: INTERVENTIONS:  1. Assess for possible contributors to thought disturbance, including medications, impaired vision or hearing, underlying metabolic abnormalities, dehydration, psychiatric diagnoses, and notify attending LIP  2. [place] high risk fall precautions, as indicated  3. Provide frequent short contacts to provide reality reorientation, refocusing and direction  4. Decrease environmental stimuli, including noise as appropriate  5. Monitor and intervene to maintain adequate nutrition, hydration, elimination, sleep and activity  6. If unable to ensure safety without constant attention obtain sitter and review sitter guidelines with assigned personnel  7. Initiate Psychosocial CNS and Spiritual Care consult, as indicated  7/28/2024 0941 by Ashanti Booth, RN  Outcome: Progressing  Flowsheets (Taken 7/28/2024 0915)  Effect of thought disturbance (confusion, delirium, dementia, or psychosis) are managed with adequate functional status: Assess for contributors to thought disturbance, including medications, impaired vision or hearing, underlying metabolic abnormalities, dehydration, psychiatric diagnoses, notify LIP  7/27/2024 2242 by Ranjit  FLY Burgess  Outcome: Progressing  Flowsheets (Taken 7/27/2024 2242)  Effect of thought disturbance (confusion, delirium, dementia, or psychosis) are managed with adequate functional status:   Assess for contributors to thought disturbance, including medications, impaired vision or hearing, underlying metabolic abnormalities, dehydration, psychiatric diagnoses, notify LIP   [place] high risk fall precautions, as indicated   Provide frequent short contacts to provide reality reorientation, refocusing and direction   Decrease environmental stimuli, including noise as appropriate     Problem: Chronic Conditions and Co-morbidities  Goal: Patient's chronic conditions and co-morbidity symptoms are monitored and maintained or improved  7/28/2024 0941 by Ashanti Booth, RN  Outcome: Progressing  Flowsheets (Taken 7/28/2024 0915)  Care Plan - Patient's Chronic Conditions and Co-Morbidity Symptoms are Monitored and Maintained or Improved: Monitor and assess patient's chronic conditions and comorbid symptoms for stability, deterioration, or improvement  7/27/2024 2242 by Jenifer Church RN  Outcome: Progressing  Flowsheets (Taken 7/27/2024 2242)  Care Plan - Patient's Chronic Conditions and Co-Morbidity Symptoms are Monitored and Maintained or Improved:   Monitor and assess patient's chronic conditions and comorbid symptoms for stability, deterioration, or improvement   Collaborate with multidisciplinary team to address chronic and comorbid conditions and prevent exacerbation or deterioration     Problem: Nutrition Deficit:  Goal: Optimize nutritional status  7/28/2024 0941 by Ashanti Booth RN  Outcome: Progressing  7/27/2024 2242 by Jenifer Church RN  Outcome: Progressing  Flowsheets (Taken 7/27/2024 2242)  Nutrient intake appropriate for improving, restoring, or maintaining nutritional needs:   Assess nutritional status and recommend course of action   Monitor oral intake, labs, and treatment plans

## 2024-07-28 NOTE — DISCHARGE INSTRUCTIONS
DR. MANTILLA DISCHARGE INSTRUCTIONS  Dr. Ebenezer Mantilla MD        ACTIVITY / WEIGHTBEARING  INSTRUCTIONS:  Weightbearing as tolerated right lower extremity   PT/OT      DIET:  Increase Fluid/Water intake, eat foods high in fiber, fruits and vegetables to help to prevent constipation.     WOUND/DRESSING INSTRUCTIONS:  Always ensure you wash your hands before and after caring for your wound/dressing.  Keep your dressing clean and dry. Change dressing as needed.  Can wash around incision.      If prineo (mesh tape dressing) in place, this may be removed after 3 weeks.   You may shower with prineo dressing if no active drainage coming from incision.   Do not let shower water directly hit the incision. Dry completely.      Do not place antibiotic ointment, lotion, creams on surgical incision.  Smoking impairs adequate wound healing.  If smoking, consider quitting.  May apply ice to surgical incision to help to prevent swelling.     MEDICATION INSTRUCTIONS:  Take pain medication as prescribed.    See orders regarding resuming your home medications and any new medications.  Continue blood thinner if ordered by your physician.      FOLLOW-UP CARE:  If a follow-up appointment was not made for you at discharge, call [phone] ([place] office) or [phone] ([place] office) to schedule an appointment for 8 weeks.     Call Dr Mantilla's office with any concerns.                 Signs of infection such as fever, chills, redness, pus, or bad smelling discharge from incision site.                 Excessive bleeding, swelling, increasing pain, or discharge around incision site.                 The stitches or staples come apart at the incision site.                 Cough shortness of breath, or chest pain. Severe nausea or vomiting.                 Pain that you cannot control with the medications you have been given or  pain becomes worse.                 Numbness, tingling, or loss of feeling in your leg, knee, or foot.

## 2024-07-29 ENCOUNTER — APPOINTMENT (OUTPATIENT)
Dept: CT IMAGING | Age: 69
DRG: 521 | End: 2024-07-29
Payer: MEDICARE

## 2024-07-29 ENCOUNTER — APPOINTMENT (OUTPATIENT)
Dept: GENERAL RADIOLOGY | Age: 69
DRG: 521 | End: 2024-07-29
Payer: MEDICARE

## 2024-07-29 LAB
AMMONIA PLAS-MCNC: 16 UMOL/L (ref 11–60)
ANION GAP SERPL CALC-SCNC: 20 MEQ/L (ref 8–16)
BASE EXCESS BLDA CALC-SCNC: 7.8 MMOL/L (ref -2–3)
BUN SERPL-MCNC: 69 MG/DL (ref 7–22)
CA-I BLD ISE-SCNC: 1.02 MMOL/L (ref 1.12–1.32)
CALCIUM SERPL-MCNC: 8.4 MG/DL (ref 8.5–10.5)
CHLORIDE SERPL-SCNC: 97 MEQ/L (ref 98–111)
CO2 SERPL-SCNC: 19 MEQ/L (ref 23–33)
COLLECTED BY:: ABNORMAL
CREAT SERPL-MCNC: 6.4 MG/DL (ref 0.4–1.2)
DEPRECATED RDW RBC AUTO: 59.7 FL (ref 35–45)
DEVICE: ABNORMAL
ERYTHROCYTE [DISTWIDTH] IN BLOOD BY AUTOMATED COUNT: 16.8 % (ref 11.5–14.5)
GFR SERPL CREATININE-BSD FRML MDRD: 7 ML/MIN/1.73M2
GLUCOSE BLD STRIP.AUTO-MCNC: 115 MG/DL (ref 70–108)
GLUCOSE SERPL-MCNC: 86 MG/DL (ref 70–108)
HCO3 BLDA-SCNC: 29 MMOL/L (ref 23–28)
HCT VFR BLD AUTO: 30.4 % (ref 37–47)
HGB BLD-MCNC: 9.8 GM/DL (ref 12–16)
LACTATE SERPL-SCNC: 0.9 MMOL/L (ref 0.5–2)
MCH RBC QN AUTO: 31.5 PG (ref 26–33)
MCHC RBC AUTO-ENTMCNC: 32.2 GM/DL (ref 32.2–35.5)
MCV RBC AUTO: 97.7 FL (ref 81–99)
PCO2 TEMP ADJ BLDMV: 28 MMHG (ref 41–51)
PH BLDMV: 7.62 [PH] (ref 7.31–7.41)
PLATELET # BLD AUTO: 218 THOU/MM3 (ref 130–400)
PMV BLD AUTO: 11.5 FL (ref 9.4–12.4)
PO2 BLDMV: 60 MMHG (ref 25–40)
POTASSIUM SERPL-SCNC: 4.4 MEQ/L (ref 3.5–5.2)
RBC # BLD AUTO: 3.11 MILL/MM3 (ref 4.2–5.4)
SAO2 % BLDMV: 95 %
SITE: ABNORMAL
SODIUM SERPL-SCNC: 136 MEQ/L (ref 135–145)
VENTILATION MODE VENT: ABNORMAL
WBC # BLD AUTO: 13 THOU/MM3 (ref 4.8–10.8)

## 2024-07-29 PROCEDURE — 82140 ASSAY OF AMMONIA: CPT

## 2024-07-29 PROCEDURE — 83605 ASSAY OF LACTIC ACID: CPT

## 2024-07-29 PROCEDURE — 82948 REAGENT STRIP/BLOOD GLUCOSE: CPT

## 2024-07-29 PROCEDURE — 82803 BLOOD GASES ANY COMBINATION: CPT

## 2024-07-29 PROCEDURE — 97110 THERAPEUTIC EXERCISES: CPT

## 2024-07-29 PROCEDURE — 2500000003 HC RX 250 WO HCPCS

## 2024-07-29 PROCEDURE — 92523 SPEECH SOUND LANG COMPREHEN: CPT

## 2024-07-29 PROCEDURE — 80048 BASIC METABOLIC PNL TOTAL CA: CPT

## 2024-07-29 PROCEDURE — 99232 SBSQ HOSP IP/OBS MODERATE 35: CPT | Performed by: INTERNAL MEDICINE

## 2024-07-29 PROCEDURE — 90935 HEMODIALYSIS ONE EVALUATION: CPT

## 2024-07-29 PROCEDURE — 74176 CT ABD & PELVIS W/O CONTRAST: CPT

## 2024-07-29 PROCEDURE — 2580000003 HC RX 258: Performed by: PHYSICIAN ASSISTANT

## 2024-07-29 PROCEDURE — 71045 X-RAY EXAM CHEST 1 VIEW: CPT

## 2024-07-29 PROCEDURE — 1200000003 HC TELEMETRY R&B

## 2024-07-29 PROCEDURE — 85027 COMPLETE CBC AUTOMATED: CPT

## 2024-07-29 PROCEDURE — 6370000000 HC RX 637 (ALT 250 FOR IP): Performed by: PHYSICIAN ASSISTANT

## 2024-07-29 PROCEDURE — 82330 ASSAY OF CALCIUM: CPT

## 2024-07-29 PROCEDURE — 36415 COLL VENOUS BLD VENIPUNCTURE: CPT

## 2024-07-29 PROCEDURE — 92610 EVALUATE SWALLOWING FUNCTION: CPT

## 2024-07-29 PROCEDURE — 70450 CT HEAD/BRAIN W/O DYE: CPT

## 2024-07-29 PROCEDURE — 97530 THERAPEUTIC ACTIVITIES: CPT

## 2024-07-29 RX ORDER — ONDANSETRON 4 MG/1
4 TABLET, ORALLY DISINTEGRATING ORAL EVERY 8 HOURS PRN
Status: CANCELLED | OUTPATIENT
Start: 2024-07-29

## 2024-07-29 RX ORDER — OXYCODONE HYDROCHLORIDE 5 MG/1
5 TABLET ORAL EVERY 4 HOURS PRN
Status: CANCELLED | OUTPATIENT
Start: 2024-07-29

## 2024-07-29 RX ORDER — POLYETHYLENE GLYCOL 3350 17 G/17G
17 POWDER, FOR SOLUTION ORAL DAILY PRN
Status: CANCELLED | OUTPATIENT
Start: 2024-07-29

## 2024-07-29 RX ORDER — SEVELAMER CARBONATE 800 MG/1
800 TABLET, FILM COATED ORAL 2 TIMES DAILY WITH MEALS
Status: CANCELLED | OUTPATIENT
Start: 2024-07-29

## 2024-07-29 RX ORDER — SENNOSIDES A AND B 8.6 MG/1
1 TABLET, FILM COATED ORAL NIGHTLY
Status: CANCELLED | OUTPATIENT
Start: 2024-07-29

## 2024-07-29 RX ORDER — PRIMIDONE 50 MG/1
50 TABLET ORAL DAILY
Status: CANCELLED | OUTPATIENT
Start: 2024-07-30

## 2024-07-29 RX ORDER — ACETAMINOPHEN 325 MG/1
650 TABLET ORAL EVERY 4 HOURS PRN
Status: CANCELLED | OUTPATIENT
Start: 2024-07-29

## 2024-07-29 RX ORDER — OXYCODONE HYDROCHLORIDE 5 MG/1
10 TABLET ORAL EVERY 4 HOURS PRN
Status: CANCELLED | OUTPATIENT
Start: 2024-07-29

## 2024-07-29 RX ORDER — MEGESTROL ACETATE 40 MG/1
40 TABLET ORAL DAILY
Status: CANCELLED | OUTPATIENT
Start: 2024-07-30

## 2024-07-29 RX ORDER — FOLIC ACID 1 MG/1
500 TABLET ORAL DAILY
Status: CANCELLED | OUTPATIENT
Start: 2024-07-30

## 2024-07-29 RX ORDER — OXYCODONE HYDROCHLORIDE AND ACETAMINOPHEN 5; 325 MG/1; MG/1
1 TABLET ORAL EVERY 8 HOURS PRN
Qty: 15 TABLET | Refills: 0 | Status: ON HOLD | OUTPATIENT
Start: 2024-07-29 | End: 2024-08-03

## 2024-07-29 RX ORDER — LANOLIN ALCOHOL/MO/W.PET/CERES
6 CREAM (GRAM) TOPICAL NIGHTLY
Status: CANCELLED | OUTPATIENT
Start: 2024-07-29

## 2024-07-29 RX ORDER — SODIUM CHLORIDE 0.9 % (FLUSH) 0.9 %
5-40 SYRINGE (ML) INJECTION PRN
Status: CANCELLED | OUTPATIENT
Start: 2024-07-29

## 2024-07-29 RX ORDER — SODIUM CHLORIDE 9 MG/ML
INJECTION, SOLUTION INTRAVENOUS PRN
Status: CANCELLED | OUTPATIENT
Start: 2024-07-29

## 2024-07-29 RX ORDER — HEPARIN SODIUM 5000 [USP'U]/ML
5000 INJECTION, SOLUTION INTRAVENOUS; SUBCUTANEOUS 2 TIMES DAILY
Status: CANCELLED | OUTPATIENT
Start: 2024-07-29

## 2024-07-29 RX ORDER — BISACODYL 10 MG
10 SUPPOSITORY, RECTAL RECTAL DAILY PRN
Status: CANCELLED | OUTPATIENT
Start: 2024-07-29

## 2024-07-29 RX ORDER — SODIUM CHLORIDE 0.9 % (FLUSH) 0.9 %
5-40 SYRINGE (ML) INJECTION EVERY 12 HOURS SCHEDULED
Status: CANCELLED | OUTPATIENT
Start: 2024-07-29

## 2024-07-29 RX ORDER — ACETAMINOPHEN 325 MG/1
650 TABLET ORAL EVERY 6 HOURS
Status: CANCELLED | OUTPATIENT
Start: 2024-07-29

## 2024-07-29 RX ORDER — BUPROPION HYDROCHLORIDE 300 MG/1
300 TABLET ORAL DAILY
Status: CANCELLED | OUTPATIENT
Start: 2024-07-30

## 2024-07-29 RX ORDER — AMLODIPINE BESYLATE 10 MG/1
10 TABLET ORAL DAILY
Status: CANCELLED | OUTPATIENT
Start: 2024-07-30

## 2024-07-29 RX ORDER — BUPROPION HYDROCHLORIDE 150 MG/1
150 TABLET ORAL EVERY MORNING
Status: CANCELLED | OUTPATIENT
Start: 2024-07-30

## 2024-07-29 RX ORDER — TRAZODONE HYDROCHLORIDE 100 MG/1
100 TABLET ORAL NIGHTLY
Status: CANCELLED | OUTPATIENT
Start: 2024-07-29

## 2024-07-29 RX ORDER — CLOPIDOGREL BISULFATE 75 MG/1
75 TABLET ORAL DAILY
Status: CANCELLED | OUTPATIENT
Start: 2024-07-30

## 2024-07-29 RX ORDER — CALCIUM GLUCONATE 10 MG/ML
1000 INJECTION, SOLUTION INTRAVENOUS ONCE
Status: COMPLETED | OUTPATIENT
Start: 2024-07-29 | End: 2024-07-29

## 2024-07-29 RX ORDER — DOCUSATE SODIUM 100 MG/1
100 CAPSULE, LIQUID FILLED ORAL 2 TIMES DAILY
Status: CANCELLED | OUTPATIENT
Start: 2024-07-29

## 2024-07-29 RX ORDER — FLUTICASONE PROPIONATE 50 MCG
2 SPRAY, SUSPENSION (ML) NASAL DAILY
Status: CANCELLED | OUTPATIENT
Start: 2024-07-30

## 2024-07-29 RX ADMIN — CARBIDOPA AND LEVODOPA 1 TABLET: 10; 100 TABLET ORAL at 14:47

## 2024-07-29 RX ADMIN — MEGESTROL ACETATE 40 MG: 40 TABLET ORAL at 10:33

## 2024-07-29 RX ADMIN — SERTRALINE 50 MG: 50 TABLET, FILM COATED ORAL at 10:31

## 2024-07-29 RX ADMIN — CARBIDOPA AND LEVODOPA 1 TABLET: 10; 100 TABLET ORAL at 20:38

## 2024-07-29 RX ADMIN — SODIUM CHLORIDE, PRESERVATIVE FREE 10 ML: 5 INJECTION INTRAVENOUS at 10:34

## 2024-07-29 RX ADMIN — DOCUSATE SODIUM 100 MG: 100 CAPSULE, LIQUID FILLED ORAL at 10:29

## 2024-07-29 RX ADMIN — BUPROPION HYDROCHLORIDE 150 MG: 150 TABLET, EXTENDED RELEASE ORAL at 10:30

## 2024-07-29 RX ADMIN — OXYCODONE HYDROCHLORIDE AND ACETAMINOPHEN 1 TABLET: 5; 325 TABLET ORAL at 03:42

## 2024-07-29 RX ADMIN — SODIUM CHLORIDE, PRESERVATIVE FREE 10 ML: 5 INJECTION INTRAVENOUS at 20:39

## 2024-07-29 RX ADMIN — BUPROPION HYDROCHLORIDE 300 MG: 300 TABLET, EXTENDED RELEASE ORAL at 10:32

## 2024-07-29 RX ADMIN — CARBIDOPA AND LEVODOPA 1 TABLET: 10; 100 TABLET ORAL at 10:31

## 2024-07-29 RX ADMIN — SENNOSIDES 8.6 MG: 8.6 TABLET, FILM COATED ORAL at 20:38

## 2024-07-29 RX ADMIN — OXYCODONE HYDROCHLORIDE AND ACETAMINOPHEN 1 TABLET: 5; 325 TABLET ORAL at 20:38

## 2024-07-29 RX ADMIN — CALCIUM GLUCONATE 1000 MG: 10 INJECTION, SOLUTION INTRAVENOUS at 10:47

## 2024-07-29 RX ADMIN — FOLIC ACID 500 MCG: 1 TABLET ORAL at 10:31

## 2024-07-29 RX ADMIN — SEVELAMER CARBONATE 800 MG: 800 TABLET, FILM COATED ORAL at 10:30

## 2024-07-29 RX ADMIN — PRIMIDONE 50 MG: 50 TABLET ORAL at 10:30

## 2024-07-29 RX ADMIN — CLOPIDOGREL BISULFATE 75 MG: 75 TABLET ORAL at 10:29

## 2024-07-29 RX ADMIN — AMLODIPINE BESYLATE 10 MG: 10 TABLET ORAL at 10:31

## 2024-07-29 RX ADMIN — DOCUSATE SODIUM 100 MG: 100 CAPSULE, LIQUID FILLED ORAL at 20:39

## 2024-07-29 ASSESSMENT — PAIN DESCRIPTION - DIRECTION: RADIATING_TOWARDS: DOWN LEG

## 2024-07-29 ASSESSMENT — PAIN SCALES - GENERAL
PAINLEVEL_OUTOF10: 6
PAINLEVEL_OUTOF10: 5
PAINLEVEL_OUTOF10: 6

## 2024-07-29 ASSESSMENT — PAIN - FUNCTIONAL ASSESSMENT
PAIN_FUNCTIONAL_ASSESSMENT: PREVENTS OR INTERFERES SOME ACTIVE ACTIVITIES AND ADLS

## 2024-07-29 ASSESSMENT — PAIN DESCRIPTION - LOCATION
LOCATION: HIP

## 2024-07-29 ASSESSMENT — PAIN DESCRIPTION - ORIENTATION
ORIENTATION: RIGHT

## 2024-07-29 ASSESSMENT — PAIN DESCRIPTION - ONSET: ONSET: ON-GOING

## 2024-07-29 ASSESSMENT — PAIN DESCRIPTION - PAIN TYPE: TYPE: SURGICAL PAIN

## 2024-07-29 ASSESSMENT — PAIN DESCRIPTION - DESCRIPTORS
DESCRIPTORS: ACHING

## 2024-07-29 ASSESSMENT — PAIN DESCRIPTION - FREQUENCY: FREQUENCY: INTERMITTENT

## 2024-07-29 NOTE — PROGRESS NOTES
[facility]   Nephrology progress note  7/29/2024, 11:11 AM      Pt Name:    Deloris Watts  MRN:     [id number]     YOB: 1955  Admit Date:    7/25/2024  3:55 PM    Chief Complaint: Nephrology following for ESRD    Subjective:  Patient seen and examined  Feels okay no complaints   at bedside    Objective:  24HR INTAKE/OUTPUT:    Intake/Output Summary (Last 24 hours) at 7/29/2024 1111  Last data filed at 7/29/2024 0356  Gross per 24 hour   Intake 600 ml   Output 150 ml   Net 450 ml      Admission weight: 46.7 kg (103 lb)  Wt Readings from Last 3 Encounters:   07/26/24 46.9 kg (103 lb 6.3 oz)   07/10/24 46.6 kg (102 lb 11.8 oz)   06/26/24 47.2 kg (104 lb)        Vitals :   Vitals:    07/28/24 2023 07/28/24 2305 07/29/24 0341 07/29/24 1015   BP: (!) 148/68 (!) 156/72 (!) 147/67 137/64   Pulse: (!) 104 99 (!) 101 93   Resp: 17 16 17 16   Temp: 97.9 °F (36.6 °C) 98.4 °F (36.9 °C) 98.2 °F (36.8 °C) 98.1 °F (36.7 °C)   TempSrc: Oral Oral Oral Oral   SpO2: 93% 94% 94% 93%   Weight:       Height:           Physical examination  General Appearance:  appears comfortable, no distress  Neck: No JVD noted  Lungs clear  Heart: S1-S2  Psych: Not agitated    Medications:  Infusion:    sodium chloride       Meds:    calcium gluconate  1,000 mg IntraVENous Once    clopidogrel  75 mg Oral Daily    buPROPion  150 mg Oral QAM    buPROPion  300 mg Oral Daily    carbidopa-levodopa  1 tablet Oral TID    docusate sodium  100 mg Oral BID    fluticasone  2 spray Each Nostril Daily    folic acid  500 mcg Oral Daily    megestrol  40 mg Oral Daily    primidone  50 mg Oral Daily    senna  1 tablet Oral Nightly    sevelamer  800 mg Oral BID with meals    traZODone  100 mg Oral Nightly    sertraline  50 mg Oral Daily    amLODIPine  10 mg Oral Daily    sodium chloride flush  5-40 mL IntraVENous 2 times per day     Lab Data :  CBC:   Recent Labs     07/27/24  0635 07/28/24  0623 07/29/24  0621   WBC 19.0*

## 2024-07-29 NOTE — PLAN OF CARE
Problem: Discharge Planning  Goal: Discharge to home or other facility with appropriate resources  Outcome: Progressing  Flowsheets (Taken 7/29/2024 0156)  Discharge to home or other facility with appropriate resources:   Identify barriers to discharge with patient and caregiver   Arrange for needed discharge resources and transportation as appropriate   Identify discharge learning needs (meds, wound care, etc)     Problem: Pain  Goal: Verbalizes/displays adequate comfort level or baseline comfort level  Outcome: Progressing  Flowsheets (Taken 7/29/2024 0156)  Verbalizes/displays adequate comfort level or baseline comfort level:   Encourage patient to monitor pain and request assistance   Assess pain using appropriate pain scale   Administer analgesics based on type and severity of pain and evaluate response   Implement non-pharmacological measures as appropriate and evaluate response     Problem: Safety - Adult  Goal: Free from fall injury  Outcome: Progressing  Flowsheets (Taken 7/29/2024 0156)  Free From Fall Injury: Instruct family/caregiver on patient safety     Problem: ABCDS Injury Assessment  Goal: Absence of physical injury  Outcome: Progressing  Flowsheets (Taken 7/29/2024 0156)  Absence of Physical Injury: Implement safety measures based on patient assessment     Problem: Skin/Tissue Integrity  Goal: Absence of new skin breakdown  Description: 1.  Monitor for areas of redness and/or skin breakdown  2.  Assess vascular access sites hourly  3.  Every 4-6 hours minimum:  Change oxygen saturation probe site  4.  Every 4-6 hours:  If on nasal continuous positive airway pressure, respiratory therapy assess nares and determine need for appliance change or resting period.  Outcome: Progressing     Problem: Confusion  Goal: Confusion, delirium, dementia, or psychosis is improved or at baseline  Description: INTERVENTIONS:  1. Assess for possible contributors to thought disturbance, including medications,

## 2024-07-29 NOTE — CARE COORDINATION
7/29/24, 2:56 PM EDT    DISCHARGE PLANNING EVALUATION    IPR considering for admission, which is patient and family's preference.  Discharge planning is ongoing

## 2024-07-29 NOTE — PROGRESS NOTES
[facility]  INPATIENT REHABILITATION  ADMISSIONS COORDINATOR CONSULT    Referral Type: internal    Patient Name: Deloris Watts      MRN: [id number]    : 1955  (68 y.o.)  Gender: female   Race:White (non-)     Payor Source: Payor: MEDICARE / Plan: MEDICARE PART A AND B / Product Type: *No Product type* /   Secondary Payor Source:  BCBS    Isolation Status: No active isolations    Lives With: Spouse  Type of Home: House  Home Layout: One level, Able to Live on Main level with bedroom/bathroom  Home Access: Ramped entrance (Pt has a ramp in the garage to enter the house.  pt's spouse uses a transport chair to assist patient in/out of house)  Receives Help From: Family     Additional Comments: Spouse reports that pt is able to ambulate short distances with someone lightly assisting. Pt had just been discharged from IPR unit on 24    What is treatment plan?  Disciplines Required upon Admission to Inpatient Rehabilitation: Physical Therapy and Occupational Therapy  Post operative: Yes  Fall: Yes  Dialysis: Yes  Diet: ADULT ORAL NUTRITION SUPPLEMENT; Breakfast, Lunch, Dinner; Frozen Oral Supplement  ADULT DIET; Regular; 3 carb choices (45 gm/meal)  Discussed patient with  and PM&R provider: Patient is appropriate for IPR  Several discussions with  Tenzin. Patient is approaching exhaustion of Medicare days and will need a break of 60 days to reset days.  aware that IPR uses hospital days and wishes to have patient admit to IPR despite nearing exhaustion. Dr Whatley updated.

## 2024-07-29 NOTE — PROGRESS NOTES
[facility]  Acute Inpatient Rehab Preadmission Assessment    Patient Name: Deloris Watts        Ethnicity:Not of , /a, or [place]ian origin  Race:White  MRN: [id number]    : 1955  (68 y.o.)  Gender: female     Admitted from:[facility]  Initial Assessment    Date of admission to the hospital: 2024  3:55 PM  Date patient eligible for admission:2024    Primary Diagnosis: right femoral fracture      Did patient have surgery?  yes - Open treatment right femoral neck fracture with a prosthesis  with Dr Mantilla on 24    Physicians: Matthew Viera MD, Dr Whatley, Dr Mosquera, Dr Mantilla, Dr Cuadra    Risk for clinical complications/co-morbidities:   Past Medical History:   Diagnosis Date    Allergic rhinitis     Anemia in CKD (chronic kidney disease)     Anxiety     CHF (congestive heart failure) (HCC)     Closed fracture of right proximal humerus 2021    ORIF    Closed right ankle fracture     In ; treated with casting    CVA (cerebral infarction)     in  ; with left-sided weakness    Depression     Fibromyalgia     in     Headache(784.0)     Hemiplegia and hemiparesis following cerebral infarction affecting left non-dominant side (HCC)     in     Hydronephrosis 2023    Urogenital Implants, calculus of ureter, UTI    Hyperlipidemia     Hypertension     in     Irritable bowel syndrome     in     Kidney failure     Chronic Kidney Disease, Renal Dialysis started in 2023    Osteoporosis 2019    Unspecified cerebral artery occlusion with cerebral infarction     Unspecified sleep apnea     Wrist fracture, right 2018    Treated with casting       Financial Information  Primary insurance: Medicare    Secondary Insurance:   BCBS    Has the patient had two or more falls in the past year or any fall with injury in the past year?   yes    Did the patient have major surgery during the 100 days prior to admission?

## 2024-07-29 NOTE — PROGRESS NOTES
[facility]  INPATIENT PHYSICAL THERAPY  DAILY NOTE  [facility] ORTHOPEDICS 7K - 7K-18/018-A    Time In: 1100  Time Out: 1123  Timed Code Treatment Minutes: 23 Minutes  Minutes: 23          Date: 2024  Patient Name: Deloris Watts,  Gender:  female        MRN: [id number]  : 1955  (68 y.o.)     Referring Practitioner: Mikayla Doll PA-C  Diagnosis: Closed displaced fracture of right femoral neck  Additional Pertinent Hx: Per ED HPI, \"Deloris Watts is a 68 y.o. female who presents to the emergency department from home, brought in by EMS for evaluation of fall, right hip pain.  Patient had unwitnessed fall at home patient accompanied by  who states that she is taking Plavix and aspirin for previous history of stroke.  Patient endorsing of right hip pain with right leg foreshortened, externally rotated.  Patient endorsing 9/10 severity pain from right hip.  Patient also noted to have dialysis port to right chest  states she last received dialysis yesterday.\"  Pt is now s/p RIGHT HIP HEMIARTHROPLASTY by Dr Mantilla on 24.     Prior Level of Function:  Lives With: Spouse  Type of Home: House  Home Layout: One level, Able to Live on Main level with bedroom/bathroom  Home Access: Ramped entrance (Pt has a ramp in the garage to enter the house.  pt's spouse uses a transport chair to assist patient in/out of house)  Home Equipment: Walker - Rolling, Walker - 4-Wheeled, Wheelchair - Manual, Lift chair, Reacher (transport chair)   Bathroom Shower/Tub: Tub/Shower unit  Bathroom Toilet: Handicap height  Bathroom Equipment: Grab bars in shower, Shower chair    Receives Help From: Family  ADL Assistance: Needs assistance  Homemaking Assistance: Needs assistance  Ambulation Assistance: Needs assistance  Transfer Assistance: Needs assistance  Active : No  Additional Comments: Spouse reports that pt is able to ambulate short distances with someone lightly assisting. Pt had just

## 2024-07-29 NOTE — PROGRESS NOTES
Orthopaedic Progress Note      SUBJECTIVE     Ms. Watts is post op day # 3 R hip raheel    Seen at bedside this morning, resting  No family bedside  no adverse events overnight  Pain well controlled, tolerating oral diet  Denies any numbness/paresthesia in operative extremity  Good efforts mobilization with staff       OBJECTIVE      Physical    VITALS:  BP (!) 147/67   Pulse (!) 101   Temp 98.2 °F (36.8 °C) (Oral)   Resp 17   Ht 1.524 m (5')   Wt 46.9 kg (103 lb 6.3 oz)   SpO2 94%   BMI 20.19 kg/m²   I/O last 3 completed shifts:  In: 1660 [P.O.:1650; I.V.:10]  Out: 0     4/10 pain  Gen: alert and oriented to name  not to year, follows commands, she is to her baseline   Head: normorcephalic, atraumatic  Resp: unlabored, room air  Pelvis: stable  RLE: prineo c/d/I, no drainage, no bandage saturation  Sensation to light touch intact  Gastroc TA EHL intact  Distal pulses palpable, warm well perfused  Calf supple nontender to palpation       Data  CBC:   Lab Results   Component Value Date/Time    WBC 11.9 2024 06:23 AM    HGB 9.8 2024 06:23 AM     2024 06:23 AM     BMP:    Lab Results   Component Value Date/Time     2024 06:23 AM    K 4.1 2024 06:23 AM    K 3.9 2024 05:02 AM    CL 96 2024 06:23 AM    CO2 21 2024 06:23 AM    BUN 53 2024 06:23 AM    CREATININE 5.7 2024 06:23 AM    CALCIUM 8.3 2024 06:23 AM    GLUCOSE 105 2024 06:23 AM    GLUCOSE 81 2017 08:50 AM     Uric Acid:  No components found for: \"URIC\"  PT/INR:    Lab Results   Component Value Date/Time    INR 0.85 2024 07:31 AM     Troponin:    Lab Results   Component Value Date/Time    TROPONINI <0.006 2013 08:24 PM     Urine Culture:  No components found for: \"CURINE\"      Current Inpatient Medications    Current Facility-Administered Medications: clopidogrel (PLAVIX) tablet 75 mg, 75 mg, Oral, Daily  buPROPion (WELLBUTRIN XL) extended release tablet

## 2024-07-29 NOTE — PROGRESS NOTES
[facility]  SPEECH THERAPY  [facility] ORTHOPED[facility] 7K  Speech - Language - Cognitive Evaluation + Clinical Swallow Evaluation    SLP Individual Minutes  Time In: 1502  Time Out: 1521  Minutes: 19  Timed Code Treatment Minutes: 0 Minutes     Speech, Language, Cognitive Evaluation: 10  Clinical Swallow Evaluation: 9    DIET ORDER RECOMMENDATIONS AFTER EVALUATION: Puree and thin liquids    Date: 2024  Patient Name: Deloris Watts      CSN: [id number]   : 1955  (68 y.o.)  Gender: female   Referring Physician:  Matthew Viera MD  Diagnosis: Closed displaced fracture of right femoral neck   Precautions: fall risk, aspiration risk   History of Present Illness/Injury: Deloris Watts is a 68-year-old female with past medical history significant for prior CVA with left-sided deficits, ESRD, Parkinson's disease, hypertension, hyperlipidemia, anxiety/depression who presents to [facility] on  after a mechanical fall with left leg pain.  Patient had an unwitnessed fall at home.  Patient reports she slipped and fell and denies any loss of consciousness, head trauma, prodromal symptoms.  Patient reported that she landed on her right hip with acute onset of pain.  Patient underwent recent left hip hemiarthroplasty on 3/2024.  Patient has been to [facility] multiple times in .  Patient went open treatment of right femoral neck fracture with prosthesis on .   Past Medical History:   Diagnosis Date    Allergic rhinitis     Anemia in CKD (chronic kidney disease)     Anxiety     CHF (congestive heart failure) (HCC)     Closed fracture of right proximal humerus 2021    ORIF    Closed right ankle fracture     In ; treated with casting    CVA (cerebral infarction)     in  ; with left-sided weakness    Depression     Fibromyalgia     in     Headache(784.0)     Hemiplegia and hemiparesis following cerebral infarction affecting left non-dominant side (HCC)     in     Hydronephrosis 2023     instrumental assessment to aide in POC development    LONG TERM GOALS:  No LTGs established d/t short NARENDRAOS      Denise Patino MA, CCC-SLP [id number]

## 2024-07-29 NOTE — PROGRESS NOTES
[facility] Nurse  Progress Note       Deloris Watts  AGE: 68 y.o.   GENDER: female  : 1955  UNIT: 7K-18/018-A  TODAY'S DATE:  2024  ADMISSION DATE: 2024  3:55 PM    Summary:     Attempted to evaluate DTPI to left buttock, POA. Patient ROSEMARIE in dialysis at this time. Will reattempt evaluation tomorrow.

## 2024-07-29 NOTE — FLOWSHEET NOTE
Stable 2.5 hour treatment completed. Removed 1 liters of fluid as tolerated. HD catheter lines flushed with normal saline, clamped with tegos in place. Dressing clean, dry, and intact. Report given to primary RN. Treatment record printed to be scanned into EMR.   07/29/24 1130 07/29/24 1427   Vital Signs   BP (!) 171/78 (!) 143/66   Temp 98.3 °F (36.8 °C) 98.6 °F (37 °C)   Pulse 95 95   Respirations 18 18   SpO2  --  94 %   Weight - Scale 49.7 kg (109 lb 9.1 oz) 48.7 kg (107 lb 5.8 oz)   Weight Method Bed scale Bed scale   Percent Weight Change  --  3.84   Post-Hemodialysis Assessment   Post-Treatment Procedures  --  Blood returned;Catheter Capped, clamped with Saline x2 ports   Machine Disinfection Process  --  Acid/Vinegar Clean;Heat Disinfect;Exterior Machine Disinfection   Blood Volume Processed (Liters)  --  56.8 L   Dialyzer Clearance  --  Lightly streaked   Duration of Treatment (minutes)  --  150 minutes   Heparin Amount Administered During Treatment (mL)  --  0 mL   Hemodialysis Intake (ml)  --  400 ml   Hemodialysis Output (ml)  --  1400 ml   NET Removed (ml)  --  1000   Tolerated Treatment  --  Good

## 2024-07-29 NOTE — PROGRESS NOTES
Hospitalist Progress Note  Internal Medicine Resident      Patient: Deloris Watts 68 y.o. female      Unit/Bed: 7K-18/018-A    Admit Date: 7/25/2024      ASSESSMENT AND PLAN  Active Problems  Right femoral neck fracture, mechanical fall: X-ray hip 7/25: Acute fracture of the right femoral neck, diffuse osteopenia, left hip hemiarthroplasty.  Underwent surgery 7/26  Fall precautions  ESRD: HD on Monday Wednesday Friday  Nephrology consulted  Continue Renvela 800 mg twice daily  Monitor electrolyte  Monitor daily BMP  Physical deconditioning: Recent mechanical falls. BMI of 20.19  Continue Megace 40 mg daily  PT/OT following  Physiatry consult placed   Leukocytosis: Likely reactive due to recent surgery.  POD #3.  No fever. WBC 7/29: 13  Continue to monitor CBC  CXR ordered 7/29  Barium swallow ordered 7/29  CT head without contrast ordered 7/29  CT A/P ordered 7/29  UA with reflex to culture ordered 7/29  Chronic microcytic anemia: Baseline hemoglobin 10 with .  Likely secondary to ESRD.  5/6/2024 vitamin B12 363, Smith greater than 20.  7/29: Hemoglobin 9.8, hematocrit 30.4  Monitor CBC  Continue folic acid 500 mcg daily  Metabolic acidosis: AG 7/29: 20  Hyperphosphatemia:  Continue Renvela 800 mg twice daily  Hypertension:  Continue amlodipine 10 mg daily  Parkinson's:  Continue Sinemet  mg p.o. 3 times daily  Continue primidone 50 mg daily  MDD/GABBY:  Continue Zoloft 50 mg daily  Continue Wellbutrin 150 mg every morning and 300 mg daily  Continue trazodone 100 mg nightly  HLD:  Simvastatin held  History of left parafalcine subdural hematoma:  Previously on Keppra, discontinued per neurology  History of ischemic CVA: Reported 15+ years ago.  Chronic left-sided weakness  Continue Plavix 75 mg daily      LDA: [x]CVC / []PICC / []Midline / []Smith / []Drains / []Mediport / []None  Antibiotics: None  Steroids: None  Labs (still needed?): [x]Yes / []No  IVF (still needed?): []Yes / [x]No    Level of  care: []Step Down / [x]Med-Surg  Bed Status: [x]Inpatient / []Observation  Telemetry: [x]Yes / []No  PT/OT: [x]Yes / []No    DVT Prophylaxis: [] Lovenox / [] Heparin / [x] SCDs / [] Already on Systemic Anticoagulation / [] None     Expected discharge date: Pending  Disposition: Pending     Code status: Full Code     ===================================================================    Chief Complaint: Follow-up right hip pain  Subjective (past 24 hours): Patient was seen at bedside with  present.  Patient seemed confused.  Patient denied any complaints of nausea/vomiting, chest pain, lightheadedness or dizziness.   stated she seemed more confused upon waking.  Infectious workup started 7/29.    HPI / Hospital Course:  Deloris Watts is a 68-year-old female with past medical history significant for prior CVA with left-sided deficits, ESRD, Parkinson's disease, hypertension, hyperlipidemia, anxiety/depression who presents to [facility] on 7/25 after a mechanical fall with left leg pain.  Patient had an unwitnessed fall at home.  Patient reports she slipped and fell and denies any loss of consciousness, head trauma, prodromal symptoms.  Patient reported that she landed on her right hip with acute onset of pain.  Patient underwent recent left hip hemiarthroplasty on 3/2024.  Patient has been to [facility] multiple times in 2024.  Patient went open treatment of right femoral neck fracture with prosthesis on 7/26.     Medications:    Infusion Medications    sodium chloride      Scheduled Medications    clopidogrel  75 mg Oral Daily    buPROPion  150 mg Oral QAM    buPROPion  300 mg Oral Daily    carbidopa-levodopa  1 tablet Oral TID    docusate sodium  100 mg Oral BID    fluticasone  2 spray Each Nostril Daily    folic acid  500 mcg Oral Daily    megestrol  40 mg Oral Daily    primidone  50 mg Oral Daily    senna  1 tablet Oral Nightly    sevelamer  800 mg Oral BID with meals    traZODone  100 mg Oral Nightly

## 2024-07-29 NOTE — PLAN OF CARE
Problem: Discharge Planning  Goal: Discharge to home or other facility with appropriate resources  7/29/2024 1949 by Ladan Nino, RN  Outcome: Progressing  Flowsheets (Taken 7/29/2024 1949)  Discharge to home or other facility with appropriate resources:   Identify barriers to discharge with patient and caregiver   Arrange for needed discharge resources and transportation as appropriate     Problem: Pain  Goal: Verbalizes/displays adequate comfort level or baseline comfort level  Outcome: Progressing  Flowsheets (Taken 7/29/2024 1949)  Verbalizes/displays adequate comfort level or baseline comfort level:   Encourage patient to monitor pain and request assistance   Assess pain using appropriate pain scale     Problem: Safety - Adult  Goal: Free from fall injury  Outcome: Progressing  Flowsheets (Taken 7/29/2024 1949)  Free From Fall Injury: Instruct family/caregiver on patient safety     Problem: ABCDS Injury Assessment  Goal: Absence of physical injury  Outcome: Progressing  Flowsheets (Taken 7/29/2024 0156 by Jenifer Church, RN)  Absence of Physical Injury: Implement safety measures based on patient assessment     Problem: Skin/Tissue Integrity  Goal: Absence of new skin breakdown  Description: 1.  Monitor for areas of redness and/or skin breakdown  2.  Assess vascular access sites hourly  3.  Every 4-6 hours minimum:  Change oxygen saturation probe site  4.  Every 4-6 hours:  If on nasal continuous positive airway pressure, respiratory therapy assess nares and determine need for appliance change or resting period.  Outcome: Progressing     Problem: Confusion  Goal: Confusion, delirium, dementia, or psychosis is improved or at baseline  Description: INTERVENTIONS:  1. Assess for possible contributors to thought disturbance, including medications, impaired vision or hearing, underlying metabolic abnormalities, dehydration, psychiatric diagnoses, and notify attending LIP  2. [place] high risk fall

## 2024-07-30 ENCOUNTER — HOSPITAL ENCOUNTER (INPATIENT)
Age: 69
LOS: 10 days | Discharge: NURSING, REHAB OR LONG-TERM CARE | DRG: 559 | End: 2024-08-09
Attending: PHYSICAL MEDICINE & REHABILITATION | Admitting: PHYSICAL MEDICINE & REHABILITATION
Payer: MEDICARE

## 2024-07-30 ENCOUNTER — APPOINTMENT (OUTPATIENT)
Dept: GENERAL RADIOLOGY | Age: 69
DRG: 521 | End: 2024-07-30
Payer: MEDICARE

## 2024-07-30 VITALS
TEMPERATURE: 98.3 F | RESPIRATION RATE: 16 BRPM | SYSTOLIC BLOOD PRESSURE: 142 MMHG | OXYGEN SATURATION: 97 % | HEART RATE: 95 BPM | DIASTOLIC BLOOD PRESSURE: 65 MMHG | HEIGHT: 60 IN | WEIGHT: 107.36 LBS | BODY MASS INDEX: 21.08 KG/M2

## 2024-07-30 DIAGNOSIS — Z96.649 S/P HIP HEMIARTHROPLASTY: ICD-10-CM

## 2024-07-30 DIAGNOSIS — S72.001A CLOSED FRACTURE OF NECK OF RIGHT FEMUR, INITIAL ENCOUNTER (HCC): Primary | ICD-10-CM

## 2024-07-30 DIAGNOSIS — S72.002A CLOSED DISPLACED FRACTURE OF LEFT FEMORAL NECK (HCC): ICD-10-CM

## 2024-07-30 PROBLEM — R13.12 OROPHARYNGEAL DYSPHAGIA: Status: ACTIVE | Noted: 2024-07-30

## 2024-07-30 LAB
ANION GAP SERPL CALC-SCNC: 15 MEQ/L (ref 8–16)
BACTERIA URNS QL MICRO: ABNORMAL /HPF
BILIRUB UR QL STRIP.AUTO: NEGATIVE
BUN SERPL-MCNC: 37 MG/DL (ref 7–22)
CALCIUM SERPL-MCNC: 8.7 MG/DL (ref 8.5–10.5)
CASTS #/AREA URNS LPF: ABNORMAL /LPF
CASTS 2: ABNORMAL /LPF
CHARACTER UR: CLEAR
CHLORIDE SERPL-SCNC: 94 MEQ/L (ref 98–111)
CO2 SERPL-SCNC: 24 MEQ/L (ref 23–33)
COLOR, UA: YELLOW
CREAT SERPL-MCNC: 3.8 MG/DL (ref 0.4–1.2)
CRYSTALS URNS MICRO: ABNORMAL
DEPRECATED RDW RBC AUTO: 59.3 FL (ref 35–45)
EPITHELIAL CELLS, UA: ABNORMAL /HPF
ERYTHROCYTE [DISTWIDTH] IN BLOOD BY AUTOMATED COUNT: 16.6 % (ref 11.5–14.5)
GFR SERPL CREATININE-BSD FRML MDRD: 12 ML/MIN/1.73M2
GLUCOSE SERPL-MCNC: 80 MG/DL (ref 70–108)
GLUCOSE UR QL STRIP.AUTO: NEGATIVE MG/DL
HCT VFR BLD AUTO: 33.8 % (ref 37–47)
HGB BLD-MCNC: 10.7 GM/DL (ref 12–16)
HGB UR QL STRIP.AUTO: ABNORMAL
KETONES UR QL STRIP.AUTO: NEGATIVE
MCH RBC QN AUTO: 30.9 PG (ref 26–33)
MCHC RBC AUTO-ENTMCNC: 31.7 GM/DL (ref 32.2–35.5)
MCV RBC AUTO: 97.7 FL (ref 81–99)
MISCELLANEOUS 2: ABNORMAL
MRSA DNA SPEC QL NAA+PROBE: NEGATIVE
NITRITE UR QL STRIP: NEGATIVE
PH UR STRIP.AUTO: 8.5 [PH] (ref 5–9)
PLATELET # BLD AUTO: 276 THOU/MM3 (ref 130–400)
PMV BLD AUTO: 12 FL (ref 9.4–12.4)
POTASSIUM SERPL-SCNC: 4.1 MEQ/L (ref 3.5–5.2)
PROT UR STRIP.AUTO-MCNC: 30 MG/DL
RBC # BLD AUTO: 3.46 MILL/MM3 (ref 4.2–5.4)
RBC URINE: ABNORMAL /HPF
RENAL EPI CELLS #/AREA URNS HPF: ABNORMAL /[HPF]
SODIUM SERPL-SCNC: 133 MEQ/L (ref 135–145)
SP GR UR REFRACT.AUTO: 1 (ref 1–1.03)
UROBILINOGEN, URINE: 0.2 EU/DL (ref 0–1)
WBC # BLD AUTO: 11 THOU/MM3 (ref 4.8–10.8)
WBC #/AREA URNS HPF: ABNORMAL /HPF
WBC #/AREA URNS HPF: NEGATIVE /[HPF]
YEAST LIKE FUNGI URNS QL MICRO: ABNORMAL

## 2024-07-30 PROCEDURE — 36415 COLL VENOUS BLD VENIPUNCTURE: CPT

## 2024-07-30 PROCEDURE — 1180000000 HC REHAB R&B

## 2024-07-30 PROCEDURE — 6360000002 HC RX W HCPCS: Performed by: PHYSICAL MEDICINE & REHABILITATION

## 2024-07-30 PROCEDURE — 92611 MOTION FLUOROSCOPY/SWALLOW: CPT

## 2024-07-30 PROCEDURE — 6370000000 HC RX 637 (ALT 250 FOR IP)

## 2024-07-30 PROCEDURE — 81001 URINALYSIS AUTO W/SCOPE: CPT

## 2024-07-30 PROCEDURE — 2500000003 HC RX 250 WO HCPCS: Performed by: INTERNAL MEDICINE

## 2024-07-30 PROCEDURE — 6360000002 HC RX W HCPCS: Performed by: PHYSICIAN ASSISTANT

## 2024-07-30 PROCEDURE — 6370000000 HC RX 637 (ALT 250 FOR IP): Performed by: PHYSICIAN ASSISTANT

## 2024-07-30 PROCEDURE — 92526 ORAL FUNCTION THERAPY: CPT

## 2024-07-30 PROCEDURE — 6370000000 HC RX 637 (ALT 250 FOR IP): Performed by: PHYSICAL MEDICINE & REHABILITATION

## 2024-07-30 PROCEDURE — 80048 BASIC METABOLIC PNL TOTAL CA: CPT

## 2024-07-30 PROCEDURE — 99222 1ST HOSP IP/OBS MODERATE 55: CPT | Performed by: PHYSICAL MEDICINE & REHABILITATION

## 2024-07-30 PROCEDURE — 74230 X-RAY XM SWLNG FUNCJ C+: CPT

## 2024-07-30 PROCEDURE — 97116 GAIT TRAINING THERAPY: CPT

## 2024-07-30 PROCEDURE — 97530 THERAPEUTIC ACTIVITIES: CPT

## 2024-07-30 PROCEDURE — 85027 COMPLETE CBC AUTOMATED: CPT

## 2024-07-30 PROCEDURE — 99232 SBSQ HOSP IP/OBS MODERATE 35: CPT | Performed by: INTERNAL MEDICINE

## 2024-07-30 PROCEDURE — 97535 SELF CARE MNGMENT TRAINING: CPT

## 2024-07-30 PROCEDURE — 2580000003 HC RX 258: Performed by: PHYSICIAN ASSISTANT

## 2024-07-30 PROCEDURE — 87641 MR-STAPH DNA AMP PROBE: CPT

## 2024-07-30 RX ORDER — ACETAMINOPHEN 325 MG/1
650 TABLET ORAL EVERY 4 HOURS PRN
Status: DISCONTINUED | OUTPATIENT
Start: 2024-07-30 | End: 2024-08-09 | Stop reason: HOSPADM

## 2024-07-30 RX ORDER — HEPARIN SODIUM 5000 [USP'U]/ML
5000 INJECTION, SOLUTION INTRAVENOUS; SUBCUTANEOUS 2 TIMES DAILY
Status: DISCONTINUED | OUTPATIENT
Start: 2024-07-30 | End: 2024-08-07

## 2024-07-30 RX ORDER — MEGESTROL ACETATE 40 MG/1
40 TABLET ORAL DAILY
Status: DISCONTINUED | OUTPATIENT
Start: 2024-07-31 | End: 2024-07-31

## 2024-07-30 RX ORDER — SODIUM CHLORIDE 0.9 % (FLUSH) 0.9 %
5-40 SYRINGE (ML) INJECTION EVERY 12 HOURS SCHEDULED
Status: DISCONTINUED | OUTPATIENT
Start: 2024-07-30 | End: 2024-08-03

## 2024-07-30 RX ORDER — ONDANSETRON 4 MG/1
4 TABLET, ORALLY DISINTEGRATING ORAL EVERY 8 HOURS PRN
Status: DISCONTINUED | OUTPATIENT
Start: 2024-07-30 | End: 2024-08-09 | Stop reason: HOSPADM

## 2024-07-30 RX ORDER — BISACODYL 10 MG
10 SUPPOSITORY, RECTAL RECTAL DAILY PRN
Status: DISCONTINUED | OUTPATIENT
Start: 2024-07-30 | End: 2024-08-09 | Stop reason: HOSPADM

## 2024-07-30 RX ORDER — ACETAMINOPHEN 325 MG/1
650 TABLET ORAL EVERY 6 HOURS
Status: DISCONTINUED | OUTPATIENT
Start: 2024-07-30 | End: 2024-08-09 | Stop reason: HOSPADM

## 2024-07-30 RX ORDER — BUPROPION HYDROCHLORIDE 300 MG/1
300 TABLET ORAL DAILY
Status: DISCONTINUED | OUTPATIENT
Start: 2024-07-31 | End: 2024-08-09 | Stop reason: HOSPADM

## 2024-07-30 RX ORDER — TRAZODONE HYDROCHLORIDE 100 MG/1
100 TABLET ORAL NIGHTLY
Status: DISCONTINUED | OUTPATIENT
Start: 2024-07-30 | End: 2024-07-31

## 2024-07-30 RX ORDER — SODIUM CHLORIDE 0.9 % (FLUSH) 0.9 %
5-40 SYRINGE (ML) INJECTION PRN
Status: DISCONTINUED | OUTPATIENT
Start: 2024-07-30 | End: 2024-08-09 | Stop reason: HOSPADM

## 2024-07-30 RX ORDER — DOCUSATE SODIUM 100 MG/1
100 CAPSULE, LIQUID FILLED ORAL 2 TIMES DAILY
Status: DISCONTINUED | OUTPATIENT
Start: 2024-07-30 | End: 2024-08-03

## 2024-07-30 RX ORDER — SENNOSIDES A AND B 8.6 MG/1
1 TABLET, FILM COATED ORAL NIGHTLY
Status: DISCONTINUED | OUTPATIENT
Start: 2024-07-30 | End: 2024-08-05

## 2024-07-30 RX ORDER — POLYETHYLENE GLYCOL 3350 17 G/17G
17 POWDER, FOR SOLUTION ORAL DAILY PRN
Status: DISCONTINUED | OUTPATIENT
Start: 2024-07-30 | End: 2024-08-09 | Stop reason: HOSPADM

## 2024-07-30 RX ORDER — DOXYCYCLINE HYCLATE 100 MG
100 TABLET ORAL 2 TIMES DAILY
Qty: 14 TABLET | Refills: 0 | OUTPATIENT
Start: 2024-07-30 | End: 2024-08-06

## 2024-07-30 RX ORDER — LOPERAMIDE HYDROCHLORIDE 2 MG/1
2 CAPSULE ORAL 4 TIMES DAILY PRN
Status: DISCONTINUED | OUTPATIENT
Start: 2024-07-30 | End: 2024-08-09 | Stop reason: HOSPADM

## 2024-07-30 RX ORDER — PRIMIDONE 50 MG/1
50 TABLET ORAL DAILY
Status: DISCONTINUED | OUTPATIENT
Start: 2024-07-31 | End: 2024-08-09 | Stop reason: HOSPADM

## 2024-07-30 RX ORDER — SODIUM CHLORIDE 9 MG/ML
INJECTION, SOLUTION INTRAVENOUS PRN
Status: DISCONTINUED | OUTPATIENT
Start: 2024-07-30 | End: 2024-08-09 | Stop reason: HOSPADM

## 2024-07-30 RX ORDER — FOLIC ACID 1 MG/1
500 TABLET ORAL DAILY
Status: DISCONTINUED | OUTPATIENT
Start: 2024-07-31 | End: 2024-08-09 | Stop reason: HOSPADM

## 2024-07-30 RX ORDER — SEVELAMER CARBONATE 800 MG/1
800 TABLET, FILM COATED ORAL 2 TIMES DAILY WITH MEALS
Status: DISCONTINUED | OUTPATIENT
Start: 2024-07-30 | End: 2024-08-09 | Stop reason: HOSPADM

## 2024-07-30 RX ORDER — OXYCODONE HYDROCHLORIDE 5 MG/1
10 TABLET ORAL EVERY 4 HOURS PRN
Status: DISCONTINUED | OUTPATIENT
Start: 2024-07-30 | End: 2024-08-08

## 2024-07-30 RX ORDER — FLUTICASONE PROPIONATE 50 MCG
2 SPRAY, SUSPENSION (ML) NASAL DAILY
Status: DISCONTINUED | OUTPATIENT
Start: 2024-07-31 | End: 2024-08-09 | Stop reason: HOSPADM

## 2024-07-30 RX ORDER — DOXYCYCLINE HYCLATE 100 MG
100 TABLET ORAL 2 TIMES DAILY
Status: DISCONTINUED | OUTPATIENT
Start: 2024-07-30 | End: 2024-07-30 | Stop reason: HOSPADM

## 2024-07-30 RX ORDER — BISACODYL 10 MG
10 SUPPOSITORY, RECTAL RECTAL DAILY PRN
Status: DISCONTINUED | OUTPATIENT
Start: 2024-07-30 | End: 2024-07-30 | Stop reason: HOSPADM

## 2024-07-30 RX ORDER — BUPROPION HYDROCHLORIDE 150 MG/1
150 TABLET ORAL EVERY MORNING
Status: DISCONTINUED | OUTPATIENT
Start: 2024-07-31 | End: 2024-08-09 | Stop reason: HOSPADM

## 2024-07-30 RX ORDER — CLOPIDOGREL BISULFATE 75 MG/1
75 TABLET ORAL DAILY
Status: DISCONTINUED | OUTPATIENT
Start: 2024-07-31 | End: 2024-08-09 | Stop reason: HOSPADM

## 2024-07-30 RX ORDER — OXYCODONE HYDROCHLORIDE 5 MG/1
5 TABLET ORAL EVERY 4 HOURS PRN
Status: DISCONTINUED | OUTPATIENT
Start: 2024-07-30 | End: 2024-08-08

## 2024-07-30 RX ORDER — AMLODIPINE BESYLATE 10 MG/1
10 TABLET ORAL DAILY
Status: DISCONTINUED | OUTPATIENT
Start: 2024-07-31 | End: 2024-08-09 | Stop reason: HOSPADM

## 2024-07-30 RX ORDER — LANOLIN ALCOHOL/MO/W.PET/CERES
6 CREAM (GRAM) TOPICAL NIGHTLY
Status: DISCONTINUED | OUTPATIENT
Start: 2024-07-30 | End: 2024-07-31

## 2024-07-30 RX ORDER — AMOXICILLIN 500 MG/1
500 CAPSULE ORAL EVERY 8 HOURS SCHEDULED
Status: DISCONTINUED | OUTPATIENT
Start: 2024-07-30 | End: 2024-07-30 | Stop reason: HOSPADM

## 2024-07-30 RX ORDER — AMOXICILLIN 500 MG/1
500 CAPSULE ORAL EVERY 8 HOURS SCHEDULED
Qty: 30 CAPSULE | Refills: 0 | OUTPATIENT
Start: 2024-07-30 | End: 2024-08-09

## 2024-07-30 RX ADMIN — BISACODYL 10 MG: 10 SUPPOSITORY RECTAL at 01:31

## 2024-07-30 RX ADMIN — ACETAMINOPHEN 650 MG: 325 TABLET ORAL at 19:58

## 2024-07-30 RX ADMIN — BUPROPION HYDROCHLORIDE 300 MG: 300 TABLET, EXTENDED RELEASE ORAL at 09:56

## 2024-07-30 RX ADMIN — DOXYCYCLINE HYCLATE 100 MG: 100 TABLET, COATED ORAL at 15:19

## 2024-07-30 RX ADMIN — SODIUM CHLORIDE, PRESERVATIVE FREE 10 ML: 5 INJECTION INTRAVENOUS at 09:56

## 2024-07-30 RX ADMIN — BARIUM SULFATE 20 ML: 400 PASTE ORAL at 10:31

## 2024-07-30 RX ADMIN — OXYCODONE HYDROCHLORIDE AND ACETAMINOPHEN 1 TABLET: 5; 325 TABLET ORAL at 09:58

## 2024-07-30 RX ADMIN — FOLIC ACID 500 MCG: 1 TABLET ORAL at 09:56

## 2024-07-30 RX ADMIN — AMOXICILLIN 500 MG: 500 CAPSULE ORAL at 15:18

## 2024-07-30 RX ADMIN — MEGESTROL ACETATE 40 MG: 40 TABLET ORAL at 09:56

## 2024-07-30 RX ADMIN — OXYCODONE HYDROCHLORIDE AND ACETAMINOPHEN 1 TABLET: 5; 325 TABLET ORAL at 04:40

## 2024-07-30 RX ADMIN — CARBIDOPA AND LEVODOPA 1 TABLET: 10; 100 TABLET ORAL at 20:21

## 2024-07-30 RX ADMIN — CARBIDOPA AND LEVODOPA 1 TABLET: 10; 100 TABLET ORAL at 15:19

## 2024-07-30 RX ADMIN — AMLODIPINE BESYLATE 10 MG: 10 TABLET ORAL at 09:56

## 2024-07-30 RX ADMIN — SENNOSIDES 8.6 MG: 8.6 TABLET, FILM COATED ORAL at 20:21

## 2024-07-30 RX ADMIN — PRIMIDONE 50 MG: 50 TABLET ORAL at 09:56

## 2024-07-30 RX ADMIN — ONDANSETRON 4 MG: 2 INJECTION INTRAMUSCULAR; INTRAVENOUS at 01:29

## 2024-07-30 RX ADMIN — BUPROPION HYDROCHLORIDE 150 MG: 150 TABLET, EXTENDED RELEASE ORAL at 09:56

## 2024-07-30 RX ADMIN — DOCUSATE SODIUM 100 MG: 100 CAPSULE, LIQUID FILLED ORAL at 09:59

## 2024-07-30 RX ADMIN — SERTRALINE 50 MG: 50 TABLET, FILM COATED ORAL at 09:56

## 2024-07-30 RX ADMIN — SEVELAMER CARBONATE 800 MG: 800 TABLET, FILM COATED ORAL at 09:56

## 2024-07-30 RX ADMIN — CLOPIDOGREL BISULFATE 75 MG: 75 TABLET ORAL at 09:58

## 2024-07-30 RX ADMIN — BARIUM SULFATE 80 ML: 0.81 POWDER, FOR SUSPENSION ORAL at 10:30

## 2024-07-30 RX ADMIN — OXYCODONE 10 MG: 5 TABLET ORAL at 19:59

## 2024-07-30 RX ADMIN — DOCUSATE SODIUM 100 MG: 100 CAPSULE, LIQUID FILLED ORAL at 20:21

## 2024-07-30 RX ADMIN — CARBIDOPA AND LEVODOPA 1 TABLET: 10; 100 TABLET ORAL at 09:56

## 2024-07-30 RX ADMIN — HEPARIN SODIUM 5000 UNITS: 5000 INJECTION INTRAVENOUS; SUBCUTANEOUS at 20:22

## 2024-07-30 RX ADMIN — SEVELAMER CARBONATE 800 MG: 800 TABLET, FILM COATED ORAL at 20:21

## 2024-07-30 ASSESSMENT — PAIN DESCRIPTION - ORIENTATION
ORIENTATION: RIGHT

## 2024-07-30 ASSESSMENT — PAIN DESCRIPTION - LOCATION
LOCATION: HIP

## 2024-07-30 ASSESSMENT — PAIN DESCRIPTION - DESCRIPTORS
DESCRIPTORS: ACHING

## 2024-07-30 ASSESSMENT — PAIN SCALES - WONG BAKER
WONGBAKER_NUMERICALRESPONSE: HURTS LITTLE MORE
WONGBAKER_NUMERICALRESPONSE: NO HURT

## 2024-07-30 ASSESSMENT — PAIN - FUNCTIONAL ASSESSMENT
PAIN_FUNCTIONAL_ASSESSMENT: PREVENTS OR INTERFERES SOME ACTIVE ACTIVITIES AND ADLS
PAIN_FUNCTIONAL_ASSESSMENT: ACTIVITIES ARE NOT PREVENTED

## 2024-07-30 ASSESSMENT — PAIN SCALES - GENERAL
PAINLEVEL_OUTOF10: 10
PAINLEVEL_OUTOF10: 9
PAINLEVEL_OUTOF10: 6

## 2024-07-30 NOTE — DISCHARGE SUMMARY
Resident Discharge Summary (Hospitalist)      Patient: Deloris Watts 68 y.o. female  : 1955  MRN: [id number]   Account: [id number]   Patient's PCP: Johana Weldon MD    Admit Date: 2024   Discharge Date:   2024    Admitting Physician: Matthew Viera MD  Discharge Physician: Jimmy Gamboa MD       Discharge Diagnoses:  Right femoral neck fracture, mechanical fall: X-ray hip : Acute fracture of the right femoral neck, diffuse osteopenia, left hip hemiarthroplasty.  ESRD: HD on   Continue Renvela 800 mg twice daily  Physical deconditioning: Recent mechanical falls. BMI of 20.19  Continue Megace 40 mg daily  Chronic microcytic anemia: Baseline hemoglobin 10 with .  Likely secondary to ESRD.  2024 vitamin B12 363, Smith greater than 20.  : Hemoglobin 9.8, hematocrit 30.4  Continue folic acid 500 mcg daily    Hyperphosphatemia:  Continue Renvela 800 mg twice daily  Hypertension:  Continue amlodipine 10 mg daily  Parkinson's:  Continue Sinemet  mg p.o. 3 times daily  Continue primidone 50 mg daily  MDD/GABBY:  Continue Zoloft 50 mg daily  Continue Wellbutrin 150 mg every morning and 300 mg daily  Continue trazodone 100 mg nightly  HLD  History of left parafalcine subdural hematoma:  Previously on Keppra, discontinued per neurology  History of ischemic CVA: Reported 15+ years ago.  Chronic left-sided weakness  Continue Plavix 75 mg daily  Leukocytosis: Resolved. Likely reactive due to recent surgery.  POD #4.  No fever. WBC : 11.0  Metabolic acidosis: Resolved AG : 15      Hospital Course:   Deloris Watts is a 68-year-old female with past medical history significant for prior CVA with left-sided deficits, ESRD, Parkinson's disease, hypertension, hyperlipidemia, anxiety/depression who presents to [facility] on  after a mechanical fall with left leg pain. Patient had an unwitnessed fall at home. Patient reports she slipped and fell and

## 2024-07-30 NOTE — PROGRESS NOTES
Physical Medicine & Rehabilitation Progress Note    Chief Complaint:  Right hip femoral neck fracture due to fall requiring right hemiarthroplasty surgery     Subjective:    Deloris Watts is a 68 y.o. right-handed slim  female with history of hypertension, hyperlipidemia, hydronephrosis, fibromyalgia, irritable bowel syndrome, essential tremor, osteoporosis, end-stage renal disease on hemodialysis (MWF) since January 2023, stroke with left side weakness in 1990s, anemia, parkinsonism, osteoporosis, depression, anxiety, right wrist and right ankle fracture treated conservatively, right proximal humeral fracture due to fall requiring ORIF, status post right carpal tunnel release surgery, status post cervical spine surgery, status post hysterectomy and bilateral oophorectomy, hemorrhoidectomy, sinus surgery, tubal ligation, L2 compression fracture requiring kyphoplasty, LASEK surgery, left femur neck displaced fracture due to fall on 3/10/2024 requiring left hip hemiarthroplasty on 3/11/2024, was admitted on 7/30/2024 for intensive inpatient management of impairment & disability secondary to right hip femur neck fracture due to fall requiring right hemiarthroplasty surgery.     The patient was known to inpatient rehab service.  She was previously admitted to inpatient rehab service on 5/29/2024 for small acute left parafalcine subdural hematoma and cerebral concussion secondary to fall accident on 5/25/2024.  She was discharged home on 6/15/2024 with referral for home care service.     The patient recently was hospitalized from 7/8/2024 to 7/11/2024 for headache.  At that time MRI of brain was performed on 7/9/2024 and showed no acute infarction but old right frontal lobe infarct.     The patient says she was going to bathroom at that time with her walker on 7/25/2024.  She left the walker outside the bathroom and went in without the walker.  Her  was in the bedroom at that time.  The patient  - 16.0 gm/dl 9.8 (L) 10.7 (L) 11.1 (L)   Hematocrit 37.0 - 47.0 % 30.4 (L) 33.8 (L) 34.8 (L)   MCV 81.0 - 99.0 fL 97.7 97.7 96.1   MCH 26.0 - 33.0 pg 31.5 30.9 30.7   MCHC 32.2 - 35.5 gm/dl 32.2 31.7 (L) 31.9 (L)   MPV 9.4 - 12.4 fL 11.5 12.0    RDW-CV 11.5 - 14.5 % 16.8 (H) 16.6 (H) 16.5 (H)   RDW-SD 35.0 - 45.0 fL 59.7 (H) 59.3 (H) 58.4 (H)   Platelet Count 130 - 400 thou/mm3 218 276 314   (H): Data is abnormally high  (L): Data is abnormally low      Impression:  Acute right femoral neck fracture due to fall accident requiring right hemiarthroplasty surgery  History of stroke (right frontal lobe infarct) with left hemiparesis  Mental status change with worsening of cognition and mentation, to rule out intracranial abnormality, to rule out sepsis  End-stage renal disease requiring hemodialysis (Monday, Wednesday and Friday)  History of fall resulting liver laceration and acute L2 compression fracture requiring kyphoplasty   History of fall on 3/10/2024 resulting displaced left femur neck fracture requiring left hip hemiarthroplasty surgery  Parkinsonism  Essential hypertension  Oropharyngeal dysphagia  Irritable bowel syndrome  Hyperlipidemia  Osteoporosis  Essential tremor  GERD  Cognitive impairment  History of hydronephrosis  History of fibromyalgia  History of depression and anxiety  History of right proximal humerus fracture requiring ORIF  History of right wrist fracture requiring casting  History of right ankle fracture requiring casting     The patient has increasing confusion, lethargy, with difficulty following instruction and command.  I will order CT of head without contrast to rule out intracranial lesion.  I will also order procalcitonin to rule out signs of sepsis.  Intensive rehab treatment will be continued as tolerated.      Plan:  Continue intensive PT/OT/SLP/RT inpatient rehabilitation program at least 3 hours per day, 5 days per week in order to improve functional status prior to discharge.

## 2024-07-30 NOTE — PROGRESS NOTES
[facility] Nurse  Progress Note       Deloris Watts  AGE: 68 y.o.   GENDER: female  : 1955  UNIT: 7K-18/018-A  TODAY'S DATE:  2024  ADMISSION DATE: 2024  3:55 PM    Subjective   Reason for C Evaluation and Assessment:     DTI on left buttock         Deloris Watts is a 68 y.o. female referred by:   [] Physician/PA/APRN  [x] Nursing  [] Other:     Wound Identification:  Wound Type: bruise  Wound Location: left buttock  Modifying factors: fall    Objective     Mert Risk Score: Mert Scale Score: 16      Assessment     Encounter: Present to pt room for \"DTPI left buttock\". Pt resting in bed with family at side. Family steps out. Assist pt to roll to right side for assessment. Pt had depends in place. Removed depends. Reapplied pt's external urinary collection device. Pt noted to have bruising to left buttock and sacral area. See photo below. Staff to continue to monitor. Pillow placed under left side. Bed in low, call light in reach.      Left buttock and sacral bruising POA      Plan     Treatment Recommendations:   Sacral and left buttock bruising: monitor. Apply zinc paste to ortiz anal area for protection d/t fecal incontinence.    Specialty Bed Required :   [x] Low Air Loss   [x] Pressure Redistribution  [] Fluid Immersion- Dolphin  [] Bariatric  [] RotoProne   [] Other:     Discharge Plan:  Placement for patient upon discharge: unknown  [] Home  [] Inpatient Rehab  [] SNF  [] ECF  [] [place]/ LTAC  Patient appropriate for Outpatient Wound Care Center: no

## 2024-07-30 NOTE — PROGRESS NOTES
[facility]  SPEECH THERAPY  [facility] ORTHOPED[facility] 7K  Modified Barium Swallow    SLP Individual Minutes  Time In: 1018  Time Out: 1035  Minutes: 17  Timed Code Treatment Minutes: 0 Minutes       DIET ORDER RECOMMENDATIONS AFTER EVALUATION: Puree diet and thin liquids    Date: 2024  Patient Name: Deloris Watts      CSN: [id number]   : 1955  (68 y.o.)  Gender: female   Referring Physician:  Matthew Viera MD  Diagnosis: Closed displaced fracture of right femoral head  Precautions: fall risk, aspiration risk   History of Present Illness/Injury: Deloris Watts is a 68 y.o. right-handed slim  female with history of hypertension, hyperlipidemia, hydronephrosis, fibromyalgia, irritable bowel syndrome, essential tremor, osteoporosis, end-stage renal disease on hemodialysis (MWF) since 2023, stroke with left side weakness in , anemia, parkinsonism, osteoporosis, depression, anxiety, right wrist and right ankle fracture treated conservatively, right proximal humeral fracture due to fall requiring ORIF, status post right carpal tunnel release surgery, status post cervical spine surgery, status post hysterectomy and bilateral oophorectomy, hemorrhoidectomy, sinus surgery, tubal ligation, L2 compression fracture requiring kyphoplasty, LASEK surgery, left femur neck displaced fracture due to fall on 3/10/2024 requiring left hip hemiarthroplasty on 3/11/2024, was admitted to [facility] on 2024 due to right hip femoral neck fracture as result of 2024 fall accident.     The patient was admitted to inpatient rehab service on 2024 for small acute left parafalcine subdural hematoma and cerebral concussion secondary to fall accident on 2024.  She was discharged home on 6/15/2024 with referral for home care service.     The patient recently was hospitalized from 2024 to 2024 for headache.  At that time MRI of brain was performed on 2024 and  fair  Discharge Recommendations: SNF and Continue to Assess Pending Progress    EDUCATION:  Learner: Patient  Education:  Reviewed results and recommendations of this evaluation, Reviewed diet and strategies, Reviewed signs, symptoms and risks of aspiration, Reviewed recommendations for follow-up, and Education Related to Potential Risks and Complications Due to Impairment/Illness/Injury  Evaluation of Education: No evidence of learning and Family not present    PLAN:  Skilled SLP intervention on acute care 3-5 x per week or until goals met and/or patient plateaus in function.  Specific interventions for next session may include: dysphagia interventions.    PATIENT GOAL:    Did not state.  Will further assess during treatment.    SHORT TERM GOALS:  Short Term Goals  Time Frame for Short Term Goals: 2 weeks  Goal 1: Patient will consume conservative puree diet (advanced textures as indicated) and thin liquids with use of compensatory strategies and no overt s/s of aspiration or pulmonary decline to maintain adequate nutrition adn hydration measures  Goal 2: Patient will complete functional recall (call light orientation) wtih 60% accuracy mod cues to improve safety within environment.  Goal 3: .  Interventions:  Following completion of MBS, patient was provided with skilled education and review of swallow study via NANCY unit feedback, review of anatomy & physiology in relation to current degree of dysphagia as well as verbal explanation of deficits presenting. Further education r/t enhanced risk for aspiration and skilled swallow strategies. Completed review and education of the following safe swallowing strategies/precautions --  *Sit upright   *Small bites/drinks  *Alternate solids/liquids    Patient with minimal insight to provided education. Anticipate ongoing education to be required.         LONG TERM GOALS:  No LTGs established d/t short NARENDRAOS      Denise Patino MA, CCC-SLP [id number]
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
## 2024-07-30 NOTE — PROGRESS NOTES
[facility]  INPATIENT PHYSICAL THERAPY  DAILY NOTE  [facility] ORTHOPEDICS 7K - 7K-18/018-A    Time In: 1333  Time Out: 1412  Timed Code Treatment Minutes: 39 Minutes  Minutes: 39          Date: 2024  Patient Name: Deloris Watts,  Gender:  female        MRN: [id number]  : 1955  (68 y.o.)     Referring Practitioner: Mikayla Doll PA-C  Diagnosis: Closed displaced fracture of right femoral neck  Additional Pertinent Hx: Per ED HPI, \"Deloris Watts is a 68 y.o. female who presents to the emergency department from home, brought in by EMS for evaluation of fall, right hip pain.  Patient had unwitnessed fall at home patient accompanied by  who states that she is taking Plavix and aspirin for previous history of stroke.  Patient endorsing of right hip pain with right leg foreshortened, externally rotated.  Patient endorsing 9/10 severity pain from right hip.  Patient also noted to have dialysis port to right chest  states she last received dialysis yesterday.\"  Pt is now s/p RIGHT HIP HEMIARTHROPLASTY by Dr Mantilla on 24.     Prior Level of Function:  Lives With: Spouse  Type of Home: House  Home Layout: One level, Able to Live on Main level with bedroom/bathroom  Home Access: Ramped entrance (Pt has a ramp in the garage to enter the house.  pt's spouse uses a transport chair to assist patient in/out of house)  Home Equipment: Walker - Rolling, Walker - 4-Wheeled, Wheelchair - Manual, Lift chair, Reacher (transport chair)   Bathroom Shower/Tub: Tub/Shower unit  Bathroom Toilet: Handicap height  Bathroom Equipment: Grab bars in shower, Shower chair    Receives Help From: Family  ADL Assistance: Needs assistance  Homemaking Assistance: Needs assistance  Ambulation Assistance: Needs assistance  Transfer Assistance: Needs assistance  Active : No  Additional Comments: Spouse reports that pt is able to ambulate short distances with someone lightly assisting. Pt had just  67.36  Mobility Inpatient without Stair CMS G-Code Modifier : CL     Modified Carolyn Scale:  Not Applicable    ASSESSMENT:  Assessment: Patient progressing toward established goals. However, limited by pain   Activity Tolerance:  Patient tolerance of  treatment:Fair.  Equipment Recommendations:Other: monitor for needs  Discharge Recommendations: Inpatient Rehabilitation  Plan: Current Treatment Recommendations: Strengthening, ROM, Balance training, Functional mobility training, Endurance training, Safety education & training, Patient/Caregiver education & training, Transfer training, Gait training, Home exercise program, Therapeutic activities  General Plan:  (6x O)    Education:  Learners: Patient  Patient Education: Plan of Care, Home Exercise Program, Bed Mobility, Transfers, Gait    Goals:  Patient Goals : go home  Short Term Goals  Time Frame for Short Term Goals: at discharge  Short Term Goal 1: Pt to be Mod I for supine <> sit to get in/out of bed  Short Term Goal 2: Pt to be Mod I for sit <> stand to get up to ambulate  Short Term Goal 3: Pt to ambulate >20 ft with RW with  Supervision for household distances  Long Term Goals  Time Frame for Long Term Goals : not set due to short ELOS    Following session, patient left in safe position with all fall risk precautions in place.

## 2024-07-30 NOTE — CARE COORDINATION
7/30/24, 1:40 PM EDT    Patient goals/plan/ treatment preferences discussed by  and .  Patient goals/plan/ treatment preferences reviewed with patient/ family.  Patient/ family verbalize understanding of discharge plan and are in agreement with goal/plan/treatment preferences.  Understanding was demonstrated using the teach back method.  AVS provided by RN at time of discharge, which includes all necessary medical information pertaining to the patients current course of illness, treatment, post-discharge goals of care, and treatment preferences.     Services At/After Discharge: Inpatient rehab    Deloris is discharging to [facility] 8 bed 5 today.

## 2024-07-30 NOTE — PROGRESS NOTES
[facility]  [facility] ORTHOPEDICS 7K  Occupational Therapy  Daily Note  Time:   Time In: 1422  Time Out: 1450  Timed Code Treatment Minutes: 28 Minutes  Minutes: 28          Date: 2024  Patient Name: Deloris Watts,   Gender: female      Room: [facility]18/018-A  MRN: [id number]  : 1955  (68 y.o.)  Referring Practitioner: Mikayla Doll PA-C  Diagnosis: fall initial encounter  Additional Pertinent Hx: Per EMR: Deloris Watts is a 68 y.o. female with PMHx of prior CVA on Plavix, end-stage renal disease on HD, chronic debility with hemiplegia/hemiparesis affecting the left side since  when patient had CVA, Parkinson disease on Sinemet, Hx small left subdural hematoma who presents to [facility] for evaluation of left leg pain following a ground-level fall at home.  This was an unwitnessed fall at home.  Patient states she was walking into her bathroom where she slipped and fell.  X-ray right hip/pelvis was obtained showed acute fracture in the right femoral neck with diffuse osteopenia as well as a previous left hip replacement. Pt s/p Open treatment right femoral neck fracture with a prosthesis on 24    Restrictions/Precautions:  Restrictions/Precautions: Fall Risk, General Precautions, Weight Bearing, Surgical protocol  Right Lower Extremity Weight Bearing: Weight Bearing As Tolerated  Position Activity Restriction  Hip Precautions: No hip flexion > 90 degrees, No hip internal rotation, No hip external rotation, No ADduction     Social/Functional History:  Lives With: Spouse  Type of Home: House  Home Layout: One level, Able to Live on Main level with bedroom/bathroom  Home Access: Ramped entrance (Pt has a ramp in the garage to enter the house.  pt's spouse uses a transport chair to assist patient in/out of house)  Home Equipment: Walker - Rolling, Walker - 4-Wheeled, Wheelchair - Manual, Lift chair, Reacher (transport chair)   Bathroom Shower/Tub: Tub/Shower  Rehabilitation  Equipment Recommendations: Equipment Needed: No  Other: defer to next level of care  Plan: Times Per Week: 5-6x  Current Treatment Recommendations: Strengthening, Balance training, Functional mobility training, Endurance training, Cognitive reorientation, Safety education & training, Patient/Caregiver education & training, Equipment evaluation, education, & procurement, Self-Care / ADL    Education:  Learners: Patient  ADL's, Family Education, Importance of Increasing Activity, Fall Prevention, and Assistive Device Safety    Goals  Short Term Goals  Time Frame for Short Term Goals: by discharge  Short Term Goal 1: Pt will increase activity tolerance for functional mobility to/from BSC or chair with CGA and minimal cues for safety/sequencing in prep for ADL completion.  Short Term Goal 2: Pt will complete LB ADL tasks with moderate assistance and LHAE to increase independence with self care tasks.  Short Term Goal 3: Pt will tolerate dynamic standing X 2 minutes with CGA and unilateral release in prep for sinkside grooming tasks.  Short Term Goal 4: Pt will complete functional transfers with CGA and minimal cues for hip precautions in prep for BSC transfers  Long Term Goals  Time Frame for Long Term Goals : not set due to ELOS    Following session, patient left in safe position with all fall risk precautions in place.

## 2024-07-30 NOTE — PROGRESS NOTES
Orthopaedic Progress Note      SUBJECTIVE     Ms. Watts is post op day # 4 R hip raheel    Seen at bedside this morning, comfortable in bed   No family bedside  no adverse events overnight  Pain well controlled, tolerating oral diet  Denies any numbness/paresthesia in operative extremity  Good efforts mobilization with staff       OBJECTIVE      Physical    VITALS:  /69   Pulse 96   Temp 98.6 °F (37 °C) (Oral)   Resp 17   Ht 1.524 m (5')   Wt 48.7 kg (107 lb 5.8 oz)   SpO2 92%   BMI 20.97 kg/m²   I/O last 3 completed shifts:  In: 1300 [P.O.:900]  Out: 1720 [Urine:320]    4/10 pain  Gen: alert and oriented to name  not to year, follows commands, she is to her baseline   Head: normorcephalic, atraumatic  Resp: unlabored, NC  Pelvis: stable  RLE: prineo c/d/I, no drainage, no bandage saturation  Sensation to light touch intact  Gastroc TA EHL intact  Distal pulses palpable, warm well perfused  Calf supple nontender to palpation       Data  CBC:   Lab Results   Component Value Date/Time    WBC 11.0 2024 06:04 AM    HGB 10.7 2024 06:04 AM     2024 06:04 AM     BMP:    Lab Results   Component Value Date/Time     2024 06:04 AM    K 4.1 2024 06:04 AM    K 3.9 2024 05:02 AM    CL 94 2024 06:04 AM    CO2 24 2024 06:04 AM    BUN 37 2024 06:04 AM    CREATININE 3.8 2024 06:04 AM    CALCIUM 8.7 2024 06:04 AM    GLUCOSE 80 2024 06:04 AM    GLUCOSE 81 2017 08:50 AM     Uric Acid:  No components found for: \"URIC\"  PT/INR:    Lab Results   Component Value Date/Time    INR 0.85 2024 07:31 AM     Troponin:    Lab Results   Component Value Date/Time    TROPONINI <0.006 2013 08:24 PM     Urine Culture:  No components found for: \"CURINE\"      Current Inpatient Medications    Current Facility-Administered Medications: bisacodyl (DULCOLAX) suppository 10 mg, 10 mg, Rectal, Daily PRN  clopidogrel (PLAVIX) tablet 75 mg, 75

## 2024-07-30 NOTE — PLAN OF CARE
Confusion, delirium, dementia, or psychosis is improved or at baseline  Description: INTERVENTIONS:  1. Assess for possible contributors to thought disturbance, including medications, impaired vision or hearing, underlying metabolic abnormalities, dehydration, psychiatric diagnoses, and notify attending LIP  2. [place] high risk fall precautions, as indicated  3. Provide frequent short contacts to provide reality reorientation, refocusing and direction  4. Decrease environmental stimuli, including noise as appropriate  5. Monitor and intervene to maintain adequate nutrition, hydration, elimination, sleep and activity  6. If unable to ensure safety without constant attention obtain sitter and review sitter guidelines with assigned personnel  7. Initiate Psychosocial CNS and Spiritual Care consult, as indicated  7/30/2024 0259 by Jenifer Church, RN  Outcome: Progressing  Flowsheets (Taken 7/30/2024 0259)  Effect of thought disturbance (confusion, delirium, dementia, or psychosis) are managed with adequate functional status:   Assess for contributors to thought disturbance, including medications, impaired vision or hearing, underlying metabolic abnormalities, dehydration, psychiatric diagnoses, notify LIP   [place] high risk fall precautions, as indicated   Provide frequent short contacts to provide reality reorientation, refocusing and direction   Decrease environmental stimuli, including noise as appropriate     Problem: Chronic Conditions and Co-morbidities  Goal: Patient's chronic conditions and co-morbidity symptoms are monitored and maintained or improved  7/30/2024 0259 by Jenifer Church, RN  Outcome: Progressing  Flowsheets (Taken 7/30/2024 0259)  Care Plan - Patient's Chronic Conditions and Co-Morbidity Symptoms are Monitored and Maintained or Improved:   Monitor and assess patient's chronic conditions and comorbid symptoms for stability, deterioration, or improvement   Collaborate with multidisciplinary  team to address chronic and comorbid conditions and prevent exacerbation or deterioration     Problem: Nutrition Deficit:  Goal: Optimize nutritional status  7/30/2024 0259 by Jenifer Church, RN  Outcome: Progressing  Flowsheets (Taken 7/30/2024 0259)  Nutrient intake appropriate for improving, restoring, or maintaining nutritional needs:   Assess nutritional status and recommend course of action   Monitor oral intake, labs, and treatment plans     Careplan reviewed with patient. Patient verbalizes understanding of care plan and contributes towards goals of care.

## 2024-07-30 NOTE — PROGRESS NOTES
[facility]   Nephrology progress note  12/29/2022, 11:34 AM      Pt Name:    Fabiana Euceda  MRN:     [id number]     YOB: 1955  Admit Date:    12/16/2022 10:35 AM    Chief Complaint: Nephrology following for acute kidney injury/CKD    Subjective:  Patient seen and examined  No chest pain or shortness of breath  Some urine output noted  Mental status appears to be much better today    Objective:  24HR INTAKE/OUTPUT:    Intake/Output Summary (Last 24 hours) at 12/29/2022 1134  Last data filed at 12/28/2022 2104  Gross per 24 hour   Intake 410 ml   Output 1900 ml   Net -1490 ml        Admission weight: 114 lb (51.7 kg)  Wt Readings from Last 3 Encounters:   12/29/22 115 lb 4.8 oz (52.3 kg)   12/16/22 114 lb (51.7 kg)   12/12/22 116 lb (52.6 kg)        Vitals :   Vitals:    12/28/22 1727 12/28/22 2000 12/29/22 0600 12/29/22 1045   BP: (!) 142/66 (!) 144/96  131/61   Pulse: 90 90  88   Resp: 18 16  16   Temp: 97.8 °F (36.6 °C) 97.7 °F (36.5 °C)  97.7 °F (36.5 °C)   TempSrc:  Oral  Oral   SpO2:  100%  97%   Weight: 115 lb 11.9 oz (52.5 kg)  115 lb 4.8 oz (52.3 kg)    Height:           Physical examination  General Appearance:  Well developed.  No distress  Mouth/Throat:  Oral mucosa moist  Neck:  Supple, no JVD  Lungs:  Breath sounds: clear  Heart[de-identified]  S1,S2 heard  Abdomen:  Soft, non - tender  Musculoskeletal:  Edema -no edema noted in all 4 extremities well  No tremor noted  CNS grossly intact  Psych not agitated     Medications:  Infusion:    sodium chloride Stopped (12/28/22 1006)    sodium chloride      [Held by provider] sodium chloride Stopped (12/23/22 1525)    sodium chloride       Meds:    epoetin mumtaz-epbx  2,000 Units SubCUTAneous Once per day on Mon Wed Fri    And    epoetin mumtaz-epbx  3,000 Units SubCUTAneous Once per day on Mon Wed Fri    [Held by provider] magnesium oxide  400 mg Oral BID    calcium replacement protocol   Other RX Placeholder    calcitRIOL  0.25 mcg Oral Once per day on Sun Tue Thu Sat    docusate sodium  100 mg Oral Daily    senna  1 tablet Oral Nightly    [Held by provider] potassium bicarb-citric acid  40 mEq Oral Daily    amLODIPine  5 mg Oral Daily    [Held by provider] aspirin  81 mg Oral Daily    buPROPion  200 mg Oral BID    [Held by provider] clopidogrel  75 mg Oral Daily    folic acid  473 mcg Oral Daily    montelukast  10 mg Oral Daily    atorvastatin  10 mg Oral Daily    traZODone  50 mg Oral Nightly    polyethylene glycol  17 g Oral Daily    sodium chloride flush  10 mL IntraVENous 2 times per day    [Held by provider] heparin (porcine)  5,000 Units SubCUTAneous 3 times per day       Lab Data :  CBC:   Recent Labs     12/27/22 0438 12/28/22  0547 12/29/22  0530   WBC 9.2 9.2 10.3   HGB 8.8* 8.6* 8.5*   HCT 27.9* 27.2* 26.8*    265 242       CMP:  Recent Labs     12/27/22 0438 12/28/22  0547 12/29/22  0530    141 141   K 4.2 4.2 4.8    104 105   CO2 24 25 26   BUN 15 26* 14   CREATININE 3.3* 4.9* 3.3*   GLUCOSE 81 76 79   CALCIUM 8.5 8.2* 8.0*       Hepatic:   Recent Labs     12/27/22 0438   LABALBU 3.4*   AST 20   ALT 20   BILITOT 0.3   ALKPHOS 153*         Assessment and Plan:  Renal -acute kidney injury most likely secondary to severe volume depletion,  She looked extremely dry, received IV fluids without much improvement so dialysis has been initiated  Serologic work-up negative, except LUCINA. Lupus work-up is pending at this time  Status post tunneled dialysis catheter placement and had a renal biopsy as well.   Once the outpatient unit is set up can be discharged  Electrolytes -within normal limits  Acid-base status-metabolic acidosis improved with dialysis  Hypocalcemia corrected calcium reasonable   CKD stage IV with baseline creatinine 1.8-2.0  Hx of fibromyalgia  Essential hypertension stable  Meds reviewed and discussed with patient,  and nursing staff    Alexx Wong MD  [facility]    This report has been created using voice recognition software.  It may contain minor errors which are inherent in voice recognition technology Quality 485: Psoriasis - Improvement In Patient-Reported Itch Severity: Itch severity assessment score is reduced by 3 or more points from the initial (index) assessment score to the follow-up visit score Quality 47: Advance Care Plan: Advance Care Planning discussed and documented in the medical record; patient did not wish or was not able to name a surrogate decision maker or provide an advance care plan. Quality 394c: Hpv Vaccine For Adolescents: Patient did not have at least two HPV vaccines (with at least 146 days between the two) OR three HPV vaccines on or between the patient’s 9th and 13th birthdays. Detail Level: Detailed Quality 394b: Td/Tdap Immunizations For Adolescents: Patient did not have one Tdap or one Td vaccine on or between the patient's 10th and 13th birthdays. Quality 226: Preventive Care And Screening: Tobacco Use: Screening And Cessation Intervention: Patient screened for tobacco use and is an ex/non-smoker Quality 130: Documentation Of Current Medications In The Medical Record: Current Medications Documented Quality 176: Tuberculosis Screening Prior To First Course Of Biologic And/Or Immune Response Modifier Therapy: Patient receiving first-time biologic and/or immune response modifier therapy, TB Screening Performed and Results Interpreted within 12 months Quality 394a: Meningococcal Immunizations For Adolescents: Patient did not have one dose of meningococcal vaccine on or between the patient's 11th and 13th birthdays. Quality 410: Psoriasis Clinical Response To Oral Systemic Or Biologic Medications: Psoriasis Assessment Tool Documented, Met Specified Benchmark

## 2024-07-30 NOTE — PROGRESS NOTES
Admitted to the Inpatient Rehabilitation Unit via bed.  Patient was then oriented to room and unit.  Education provided on the rehabilitation routine: three hours of therapy five days per week.      Explained patients right to have family, representative or physician notified of their admission.  Patient has Requested for physician to be notified.  Patient has Declined for family/representative to be notified.    Admitting medication orders compared with acute stay medications; home medication list reviewed with patient/family.  Medication issues identified No  Medication issue: n/a  If yes, physician notified n/a  Transportation:   Has transportation kept you from medical appointments, meetings, work, or from getting things needed for daily living? (Check all that apply)  No.      Health Literacy:   How often do you need to have someone help you when you read instructions, pamphlets, or other written material from your doctor or pharmacy?  0. - Never    Social Isolation:  How often do you feel lonely or isolated from those around you?  3.  Often    Patient Mood Interview (PHQ-2 to 9) (from [employer]©)   Say to Patient: \"Over the last 2 weeks, have you been bothered by any of the following problems?\"   If symptom is present, enter yes in column 1 (Symptom Presence)  If yes in column 1, then ask the patient: “About how often have you been bothered by this?” Indicate response in column 2, Symptom Frequency.   Symptom Presence  No    Yes   9.    No response  Symptom Frequency  Never or 1 day  2-6 days (several days)  7-11 days (half or more of the days)  12-14 days (nearly every day)    Symptom Presence Symptom Frequency   Little interest or pleasure in doing things 1. Yes 3. 12-14 days (nearly every day)   Feeling down, depressed, or hopeless 1. Yes 1. 2-6 days (several days)   If either A or B above has symptom frequency coded 2 or 3, CONTINUE asking questions below.      If not, END the interview  and right click

## 2024-07-30 NOTE — PROGRESS NOTES
[facility]   Nephrology progress note  7/30/2024, 11:00 AM      Pt Name:    Deloris Watts  MRN:     [id number]     YOB: 1955  Admit Date:    7/25/2024  3:55 PM    Chief Complaint: Nephrology following for ESRD    Subjective:  Patient seen and examined  Feels okay no complaints   at bedside  Mental status seems to be better    Objective:  24HR INTAKE/OUTPUT:    Intake/Output Summary (Last 24 hours) at 7/30/2024 1100  Last data filed at 7/30/2024 0333  Gross per 24 hour   Intake 850 ml   Output 1720 ml   Net -870 ml      Admission weight: 46.7 kg (103 lb)  Wt Readings from Last 3 Encounters:   07/29/24 48.7 kg (107 lb 5.8 oz)   07/10/24 46.6 kg (102 lb 11.8 oz)   06/26/24 47.2 kg (104 lb)        Vitals :   Vitals:    07/29/24 2342 07/30/24 0430 07/30/24 0437 07/30/24 0945   BP: 104/82 135/69  (!) 142/65   Pulse: 96 96  95   Resp: 16 17  16   Temp: 98.1 °F (36.7 °C) 98.6 °F (37 °C)  98.3 °F (36.8 °C)   TempSrc: Oral Oral  Oral   SpO2: 93% (!) 88% 92% 97%   Weight:       Height:           Physical examination  General Appearance:  appears comfortable, no distress  Neck: No JVD noted  Lungs clear  Heart: S1-S2  Psych: Not agitated  CNS grossly intact    Medications:  Infusion:    sodium chloride       Meds:    clopidogrel  75 mg Oral Daily    buPROPion  150 mg Oral QAM    buPROPion  300 mg Oral Daily    carbidopa-levodopa  1 tablet Oral TID    docusate sodium  100 mg Oral BID    fluticasone  2 spray Each Nostril Daily    folic acid  500 mcg Oral Daily    megestrol  40 mg Oral Daily    primidone  50 mg Oral Daily    senna  1 tablet Oral Nightly    sevelamer  800 mg Oral BID with meals    traZODone  100 mg Oral Nightly    sertraline  50 mg Oral Daily    amLODIPine  10 mg Oral Daily    sodium chloride flush  5-40 mL IntraVENous 2 times per day     Lab Data :  CBC:   Recent Labs     07/28/24  0623 07/29/24  0621 07/30/24  0604   WBC 11.9* 13.0* 11.0*   HGB 9.8* 9.8* 10.7*   HCT

## 2024-07-31 ENCOUNTER — APPOINTMENT (OUTPATIENT)
Dept: CT IMAGING | Age: 69
DRG: 559 | End: 2024-07-31
Attending: PHYSICAL MEDICINE & REHABILITATION
Payer: MEDICARE

## 2024-07-31 LAB
ALBUMIN SERPL BCG-MCNC: 3.3 G/DL (ref 3.5–5.1)
ALP SERPL-CCNC: 172 U/L (ref 38–126)
ALT SERPL W/O P-5'-P-CCNC: < 5 U/L (ref 11–66)
ANION GAP SERPL CALC-SCNC: 16 MEQ/L (ref 8–16)
AST SERPL-CCNC: 17 U/L (ref 5–40)
AUTO DIFF PNL BLD: ABNORMAL
BASOPHILS ABSOLUTE: 0 THOU/MM3 (ref 0–0.1)
BASOPHILS NFR BLD AUTO: 0 %
BILIRUB SERPL-MCNC: 0.5 MG/DL (ref 0.3–1.2)
BUN SERPL-MCNC: 63 MG/DL (ref 7–22)
CALCIUM SERPL-MCNC: 9 MG/DL (ref 8.5–10.5)
CHLORIDE SERPL-SCNC: 94 MEQ/L (ref 98–111)
CHOLEST SERPL-MCNC: 131 MG/DL (ref 100–199)
CO2 SERPL-SCNC: 26 MEQ/L (ref 23–33)
CREAT SERPL-MCNC: 5.3 MG/DL (ref 0.4–1.2)
DEPRECATED RDW RBC AUTO: 58.4 FL (ref 35–45)
EOSINOPHIL NFR BLD AUTO: 7 %
EOSINOPHILS ABSOLUTE: 0.6 THOU/MM3 (ref 0–0.4)
ERYTHROCYTE [DISTWIDTH] IN BLOOD BY AUTOMATED COUNT: 16.5 % (ref 11.5–14.5)
GFR SERPL CREATININE-BSD FRML MDRD: 8 ML/MIN/1.73M2
GLUCOSE SERPL-MCNC: 78 MG/DL (ref 70–108)
HCT VFR BLD AUTO: 34.8 % (ref 37–47)
HDLC SERPL-MCNC: 60 MG/DL
HGB BLD-MCNC: 11.1 GM/DL (ref 12–16)
LACTATE SERPL-SCNC: 0.6 MMOL/L (ref 0.5–2)
LDLC SERPL CALC-MCNC: 58 MG/DL
LYMPHOCYTES ABSOLUTE: 1.2 THOU/MM3 (ref 1–4.8)
LYMPHOCYTES NFR BLD AUTO: 13 %
MAGNESIUM SERPL-MCNC: 2.7 MG/DL (ref 1.6–2.4)
MANUAL DIFF BLD: NORMAL
MCH RBC QN AUTO: 30.7 PG (ref 26–33)
MCHC RBC AUTO-ENTMCNC: 31.9 GM/DL (ref 32.2–35.5)
MCV RBC AUTO: 96.1 FL (ref 81–99)
METAMYELOCYTES: 1 %
MONOCYTES ABSOLUTE: 0.8 THOU/MM3 (ref 0.4–1.3)
MONOCYTES NFR BLD AUTO: 9 %
MYELOCYTES: 5 %
NEUTROPHILS ABSOLUTE: 5.9 THOU/MM3 (ref 1.8–7.7)
NEUTROPHILS NFR BLD AUTO: 64 %
NRBC BLD AUTO-RTO: 0 /100 WBC
PATHOLOGIST REVIEW: ABNORMAL
PLATELET # BLD AUTO: 314 THOU/MM3 (ref 130–400)
PLATELET BLD QL SMEAR: ADEQUATE
PMV BLD AUTO: 11.4 FL (ref 9.4–12.4)
POIKILOCYTES: ABNORMAL
POTASSIUM SERPL-SCNC: 4.3 MEQ/L (ref 3.5–5.2)
POTASSIUM SERPL-SCNC: 4.3 MEQ/L (ref 3.5–5.2)
PREALB SERPL-MCNC: 15.5 MG/DL (ref 20–40)
PROCALCITONIN SERPL IA-MCNC: 1.61 NG/ML (ref 0.01–0.09)
PROMYELOCYTES: 1 %
PROT SERPL-MCNC: 6.1 G/DL (ref 6.1–8)
RBC # BLD AUTO: 3.62 MILL/MM3 (ref 4.2–5.4)
SODIUM SERPL-SCNC: 136 MEQ/L (ref 135–145)
TRIGL SERPL-MCNC: 66 MG/DL (ref 0–199)
WBC # BLD AUTO: 9.2 THOU/MM3 (ref 4.8–10.8)

## 2024-07-31 PROCEDURE — 99232 SBSQ HOSP IP/OBS MODERATE 35: CPT | Performed by: PHYSICAL MEDICINE & REHABILITATION

## 2024-07-31 PROCEDURE — 99221 1ST HOSP IP/OBS SF/LOW 40: CPT | Performed by: INTERNAL MEDICINE

## 2024-07-31 PROCEDURE — 85025 COMPLETE CBC W/AUTO DIFF WBC: CPT

## 2024-07-31 PROCEDURE — 92523 SPEECH SOUND LANG COMPREHEN: CPT

## 2024-07-31 PROCEDURE — 70450 CT HEAD/BRAIN W/O DYE: CPT

## 2024-07-31 PROCEDURE — 36415 COLL VENOUS BLD VENIPUNCTURE: CPT

## 2024-07-31 PROCEDURE — 6360000002 HC RX W HCPCS: Performed by: PHYSICAL MEDICINE & REHABILITATION

## 2024-07-31 PROCEDURE — 83735 ASSAY OF MAGNESIUM: CPT

## 2024-07-31 PROCEDURE — 84145 PROCALCITONIN (PCT): CPT

## 2024-07-31 PROCEDURE — 97163 PT EVAL HIGH COMPLEX 45 MIN: CPT

## 2024-07-31 PROCEDURE — 97535 SELF CARE MNGMENT TRAINING: CPT

## 2024-07-31 PROCEDURE — 90935 HEMODIALYSIS ONE EVALUATION: CPT

## 2024-07-31 PROCEDURE — 97530 THERAPEUTIC ACTIVITIES: CPT

## 2024-07-31 PROCEDURE — 92610 EVALUATE SWALLOWING FUNCTION: CPT

## 2024-07-31 PROCEDURE — 80061 LIPID PANEL: CPT

## 2024-07-31 PROCEDURE — 2580000003 HC RX 258: Performed by: PHYSICAL MEDICINE & REHABILITATION

## 2024-07-31 PROCEDURE — 1180000000 HC REHAB R&B

## 2024-07-31 PROCEDURE — 92526 ORAL FUNCTION THERAPY: CPT

## 2024-07-31 PROCEDURE — 6370000000 HC RX 637 (ALT 250 FOR IP): Performed by: PHYSICAL MEDICINE & REHABILITATION

## 2024-07-31 PROCEDURE — 84134 ASSAY OF PREALBUMIN: CPT

## 2024-07-31 PROCEDURE — 97110 THERAPEUTIC EXERCISES: CPT

## 2024-07-31 PROCEDURE — 83605 ASSAY OF LACTIC ACID: CPT

## 2024-07-31 PROCEDURE — 5A1D70Z PERFORMANCE OF URINARY FILTRATION, INTERMITTENT, LESS THAN 6 HOURS PER DAY: ICD-10-PCS | Performed by: INTERNAL MEDICINE

## 2024-07-31 PROCEDURE — 99232 SBSQ HOSP IP/OBS MODERATE 35: CPT | Performed by: INTERNAL MEDICINE

## 2024-07-31 PROCEDURE — 80053 COMPREHEN METABOLIC PANEL: CPT

## 2024-07-31 PROCEDURE — 97167 OT EVAL HIGH COMPLEX 60 MIN: CPT

## 2024-07-31 RX ORDER — MEGESTROL ACETATE 40 MG/1
40 TABLET ORAL 2 TIMES DAILY
Status: DISCONTINUED | OUTPATIENT
Start: 2024-07-31 | End: 2024-08-04

## 2024-07-31 RX ORDER — LANOLIN ALCOHOL/MO/W.PET/CERES
3 CREAM (GRAM) TOPICAL NIGHTLY
Status: DISCONTINUED | OUTPATIENT
Start: 2024-07-31 | End: 2024-08-01

## 2024-07-31 RX ORDER — TRAZODONE HYDROCHLORIDE 50 MG/1
50 TABLET ORAL NIGHTLY PRN
Status: DISCONTINUED | OUTPATIENT
Start: 2024-07-31 | End: 2024-08-01

## 2024-07-31 RX ADMIN — BUPROPION HYDROCHLORIDE 300 MG: 300 TABLET, EXTENDED RELEASE ORAL at 08:53

## 2024-07-31 RX ADMIN — CARBIDOPA AND LEVODOPA 1 TABLET: 10; 100 TABLET ORAL at 16:07

## 2024-07-31 RX ADMIN — CARBIDOPA AND LEVODOPA 1 TABLET: 10; 100 TABLET ORAL at 21:07

## 2024-07-31 RX ADMIN — CLOPIDOGREL BISULFATE 75 MG: 75 TABLET ORAL at 08:38

## 2024-07-31 RX ADMIN — HEPARIN SODIUM 5000 UNITS: 5000 INJECTION INTRAVENOUS; SUBCUTANEOUS at 08:39

## 2024-07-31 RX ADMIN — SERTRALINE 50 MG: 50 TABLET, FILM COATED ORAL at 08:38

## 2024-07-31 RX ADMIN — ACETAMINOPHEN 650 MG: 325 TABLET ORAL at 06:12

## 2024-07-31 RX ADMIN — PRIMIDONE 50 MG: 50 TABLET ORAL at 08:38

## 2024-07-31 RX ADMIN — SENNOSIDES 8.6 MG: 8.6 TABLET, FILM COATED ORAL at 21:07

## 2024-07-31 RX ADMIN — ACETAMINOPHEN 650 MG: 325 TABLET ORAL at 18:11

## 2024-07-31 RX ADMIN — FOLIC ACID 500 MCG: 1 TABLET ORAL at 08:38

## 2024-07-31 RX ADMIN — BUPROPION HYDROCHLORIDE 150 MG: 150 TABLET, EXTENDED RELEASE ORAL at 08:39

## 2024-07-31 RX ADMIN — HEPARIN SODIUM 5000 UNITS: 5000 INJECTION INTRAVENOUS; SUBCUTANEOUS at 21:06

## 2024-07-31 RX ADMIN — SEVELAMER CARBONATE 800 MG: 800 TABLET, FILM COATED ORAL at 18:11

## 2024-07-31 RX ADMIN — TRAZODONE HYDROCHLORIDE 50 MG: 50 TABLET ORAL at 21:07

## 2024-07-31 RX ADMIN — DOCUSATE SODIUM 100 MG: 100 CAPSULE, LIQUID FILLED ORAL at 08:39

## 2024-07-31 RX ADMIN — Medication 3 MG: at 21:07

## 2024-07-31 RX ADMIN — MEGESTROL ACETATE 40 MG: 40 TABLET ORAL at 21:07

## 2024-07-31 RX ADMIN — DOCUSATE SODIUM 100 MG: 100 CAPSULE, LIQUID FILLED ORAL at 21:07

## 2024-07-31 RX ADMIN — FLUTICASONE PROPIONATE 2 SPRAY: 50 SPRAY, METERED NASAL at 08:39

## 2024-07-31 RX ADMIN — SODIUM CHLORIDE, PRESERVATIVE FREE 10 ML: 5 INJECTION INTRAVENOUS at 21:21

## 2024-07-31 RX ADMIN — OXYCODONE 5 MG: 5 TABLET ORAL at 21:08

## 2024-07-31 RX ADMIN — SEVELAMER CARBONATE 800 MG: 800 TABLET, FILM COATED ORAL at 08:39

## 2024-07-31 RX ADMIN — CARBIDOPA AND LEVODOPA 1 TABLET: 10; 100 TABLET ORAL at 08:39

## 2024-07-31 RX ADMIN — AMLODIPINE BESYLATE 10 MG: 10 TABLET ORAL at 08:38

## 2024-07-31 RX ADMIN — MEGESTROL ACETATE 40 MG: 40 TABLET ORAL at 08:39

## 2024-07-31 ASSESSMENT — PAIN - FUNCTIONAL ASSESSMENT: PAIN_FUNCTIONAL_ASSESSMENT: ACTIVITIES ARE NOT PREVENTED

## 2024-07-31 ASSESSMENT — PAIN DESCRIPTION - ONSET: ONSET: ON-GOING

## 2024-07-31 ASSESSMENT — PAIN DESCRIPTION - FREQUENCY: FREQUENCY: CONTINUOUS

## 2024-07-31 ASSESSMENT — PAIN DESCRIPTION - DESCRIPTORS: DESCRIPTORS: ACHING;THROBBING

## 2024-07-31 ASSESSMENT — PAIN DESCRIPTION - ORIENTATION: ORIENTATION: RIGHT

## 2024-07-31 ASSESSMENT — PAIN DESCRIPTION - LOCATION: LOCATION: HIP

## 2024-07-31 ASSESSMENT — PAIN DESCRIPTION - PAIN TYPE: TYPE: SURGICAL PAIN

## 2024-07-31 ASSESSMENT — PAIN SCALES - GENERAL
PAINLEVEL_OUTOF10: 10
PAINLEVEL_OUTOF10: 0

## 2024-07-31 NOTE — CARE COORDINATION
Patient physically unable to sign consent for treatment due to confusion.  was not at bedside. Registration stated they called patients  an was unable to contact him. So this RN and registration signed consent for treatment on behalf of patient.

## 2024-07-31 NOTE — FLOWSHEET NOTE
07/31/24 1134 07/31/24 1435   Vital Signs   BP (!) 143/79 (!) 176/82   Temp 97.3 °F (36.3 °C) 97.3 °F (36.3 °C)   Pulse 93 96   Respirations 16 20   SpO2 96 % 96 %   Weight - Scale 46.8 kg (103 lb 2.8 oz) 46.8 kg (103 lb 2.8 oz)   Weight Method Bed scale Bed scale   Percent Weight Change -4.68 0   Post-Hemodialysis Assessment   Post-Treatment Procedures  --  Blood returned;Catheter Capped, clamped with Saline x2 ports   Machine Disinfection Process  --  Acid/Vinegar Clean;Heat Disinfect;Exterior Machine Disinfection   Blood Volume Processed (Liters)  --  56.5 L   Dialyzer Clearance  --  Moderately streaked   Duration of Treatment (minutes)  --  150 minutes   Heparin Amount Administered During Treatment (mL)  --  0 mL   Hemodialysis Intake (ml)  --  400 ml   Hemodialysis Output (ml)  --  400 ml   NET Removed (ml)  --  0   Tolerated Treatment  --  Good     Stable 2.5 hour treatment completed. Removed no fluid. HD catheter flushed, clamped with tegos in place. Dressing clean, dry, and intact. Report called to primary RN. Treatment record printed to be scanned into EMR.

## 2024-07-31 NOTE — PROGRESS NOTES
[facility]  INPATIENT PHYSICAL THERAPY  EVALUATION  [employer] - [id number]    Time In: 0908  Time Out: 1040  Timed Code Treatment Minutes: 70 Minutes  Minutes: 92          Date: 2024  Patient Name: Deloris Watts,  Gender:  female        MRN: [id number]  : 1955  (68 y.o.)      Referring Practitioner: Jax Whatley MD  Diagnosis: Closed fracture of neck of right demu, initial encounter ([facility])  Additional Pertinent Hx: Per EMR: \" Deloris is a 68 year old female whom has been in/out of hospital for quite some time with varying medical conditions.  Most recently pt re-addmitted back to the hosital due to a fall in the home environment in which resulting in pain in LE with imaging indicating an acute fracture in the R femoral neck with diffuse osteopenia.  Pt. underwent open treatment to R femoral neck fracture with prothesis on 24 and is WBAT with hip precautions in place. See physicans H&P for additional information.\"     Restrictions/Precautions:  Restrictions/Precautions: Fall Risk, General Precautions, Weight Bearing, Surgical protocol  Right Lower Extremity Weight Bearing: Weight Bearing As Tolerated  Position Activity Restriction  Hip Precautions: No hip flexion > 90 degrees, No hip internal rotation, No hip external rotation, No ADduction    Subjective:  Chart Reviewed: Yes  Patient assessed for rehabilitation services?: Yes  Family / Caregiver Present: Yes  Subjective: Pt was sitting in bedside chair with  and Daughter present when PT arrived. Pt is agreeable to PT evaluation with encouragement. Initial observation of decreased attention and increased distraction from family presence. Family removed themselves from session for improved pt attention. Pt is able to verbalize place, year, birthday and mentions a fall when asked why she is here. She presents with overall poor cognition.    General:     Vision: Impaired  Vision Exceptions: Wears  surfaces  Long Term Goal 4: Pt will perform car transfer with modA for ability to get in and out of the car.  Long Term Goal 5: Pt will tolerate standing > 5min to RW or other support to demo improved tolerance to WB through RLE.    Following session, patient left supine in bed with bed alarm activated. Call light and bedside table are within reach.  and daughter are present. Nursing staff made aware of pt location.

## 2024-07-31 NOTE — CONSULTS
tablet Take 1 tablet by mouth 3 times daily At 7 am, 12 noon and 5 pm 6/14/24   Jax Whatley MD   amLODIPine (NORVASC) 10 MG tablet Take 1 tablet by mouth daily 6/15/24   Jax Whatley MD   docusate sodium (COLACE, DULCOLAX) 100 MG CAPS Take 100 mg by mouth 2 times daily ; hold if diarrhea 6/14/24   Jax Whatley MD   senna (SENOKOT) 8.6 MG tablet Take 1 tablet by mouth nightly ; hold if diarrhea 6/14/24   Jax Whatley MD   polyethylene glycol (GLYCOLAX) 17 g packet Take 1 packet by mouth daily as needed for Constipation 4/8/24   Johana Weldon MD   megestrol (MEGACE) 40 MG tablet Take 1 tablet by mouth daily To stimulate appetite 3/30/24   Jax Whatley MD   Probiotic Product (PROBIOTIC DAILY PO) Take 1 tablet by mouth daily 1 gummie daily    ProviderShey MD   sodium chloride (OCEAN, BABY AYR) 0.65 % nasal spray 1 spray by Nasal route as needed for Congestion Saline nasal spray as needed    ProviderShey MD   montelukast (SINGULAIR) 10 MG tablet Take 1 tablet by mouth daily  Patient not taking: Reported on 6/19/2024 2/20/24   Johana Weldon MD   bisacodyl (DULCOLAX) 10 MG suppository Place 1 suppository rectally daily as needed for Constipation 1/5/24   Jax Whatley MD   fluticasone (FLONASE) 50 MCG/ACT nasal spray 2 sprays by Each Nostril route daily 1/5/24   Jax Whatley MD   simvastatin (ZOCOR) 20 MG tablet TAKE 1 TABLET BY MOUTH DAILY  Patient not taking: Reported on 6/19/2024 6/1/23   Johana Weldon MD   sevelamer (RENVELA) 800 MG tablet Take 1 tablet by mouth 3 times daily With meals 5/1/23   Shey New MD   linaclotide (LINZESS) 145 MCG capsule Take 2 capsules by mouth daily as needed (constipation) PRN    Shey New MD   blood glucose monitor strips Test 1-2x/day. 3/9/23   Johana Weldon MD   ondansetron (ZOFRAN-ODT) 4 MG disintegrating tablet Take 1 tablet by mouth 3 times daily as needed for Nausea or      CMP:  Recent Labs     07/29/24  0621 07/30/24  0604 07/31/24  0648    133* 136   K 4.4 4.1 4.3  4.3   CL 97* 94* 94*   CO2 19* 24 26   BUN 69* 37* 63*   CREATININE 6.4* 3.8* 5.3*   GLUCOSE 86 80 78   CALCIUM 8.4* 8.7 9.0   MG  --   --  2.7*     Hepatic:   Recent Labs     07/31/24  0648   AST 17   ALT <5*   BILITOT 0.5   ALKPHOS 172*       Assessment and Plan:  ESRD on hemodialysis Monday Wednesday Friday  Volume status and electrolytes stable  Today is her regular day we will get her dialyzed  Electrolytes within normal limits  Anemia in ESRD.  Hemoglobin stable.  Will monitor  Leukocytosis  Status post fall  Femoral neck fracture status post surgery  Discussed with  and the daughter    To Cuadra MD  [facility]    This report has been created using voice recognition software. It may contain minor errors which are inherent in voice recognition technology

## 2024-07-31 NOTE — CARE COORDINATION
Patient sleeping well this shift, repositioned on left side. Right hip painful when repositioning, favors her left side, added pillows for comfort. No complaints of  discomfort at this present time. Bed alarm on call light in reach.

## 2024-07-31 NOTE — PROGRESS NOTES
[facility]  INPATIENT OCCUPATIONAL THERAPY  [facility]  EVALUATION    Time:    Time In: 0700  Time Out: 0800  Timed Code Treatment Minutes: 45 Minutes  Minutes: 60          Date: 2024  Patient Name: Deloris Watts,   Gender: female      MRN: [id number]  : 1955  (68 y.o.)  Referring Practitioner: Jax Whatley MD  Diagnosis: closed fracture of neck of right femur, initial encounter  Additional Pertinent Hx: Deloris is a 68 year old female whom has been in/out of hospital for quite some time with varying medical conditions.  Most recently pt re-addmitted back to the [facility] due to a fall in the home environment in which resulting in pain in LE with imaging indicating an acute fracture in the R femoral neck with diffuse osteopenia.  Pt. underwent open treatment to R femoral neck fracture with prothesis on 24 and is WBAT. Pt. admitted back to the [facility] on 24 for further medical care and referred to skilled OT services at this time.  See physicans H&P for additional information.    Restrictions/Precautions:  Restrictions/Precautions: Fall Risk, General Precautions, Weight Bearing, Surgical protocol  Right Lower Extremity Weight Bearing: Weight Bearing As Tolerated  Position Activity Restriction  Hip Precautions: No hip flexion > 90 degrees, No hip internal rotation, No hip external rotation, No ADduction    Subjective  Chart Reviewed: Yes, Orders, Progress Notes, History and Physical  Patient assessed for rehabilitation services?: Yes  Pt. Pleasant and cooperative throughout session.  However, pt limited by cognition throughout.        Pain: No numerics provided, however pt reports pain in R hip and screams out in pain with movement at times.      Vitals: Vitals not assessed per clinical judgement, see nursing flowsheet    Social/Functional History:  Lives With: Spouse  Type of Home: House  Home Layout: One level, Able to Live on Main level with

## 2024-07-31 NOTE — PROGRESS NOTES
discharge.    OCCUPATIONAL THERAPY  Pt. seen for OT eval and tx on the IPR unit this date in which pt is not at baseline/PLOF in which requirring continued OT services to adapt/modify daily routines as appropriate/able.  Pt. requires Max A x2 for all transfers, Min A x2 for short distance mobility with RW, dependent for LB dressing, Max A UB dressing, Max A grooming and sponge bathing.  Pt. demo increased difficulty with follow commands/demands of task, reduced safey/insight and overall reduced cognition and physical performance.  Without continued OT services pt is at risk of falls, further decline in functioning and increased dependency on others.  Other: defer at this time    RECREATIONAL THERAPY  Patient has been offered participation in recreational therapy activities and participates as able.    NUTRITION  Weight - Scale: 46.8 kg (103 lb 2.8 oz) / Body mass index is 20.15 kg/m².  Current diet: ADULT ORAL NUTRITION SUPPLEMENT; Breakfast; Standard High Calorie/High Protein Oral Supplement  ADULT DIET; Dysphagia - Pureed  ADULT ORAL NUTRITION SUPPLEMENT; Breakfast, Lunch, Dinner; Frozen Oral Supplement  Please see nutrition note for details.    NURSING  Continent of Bowel: Yes.  Frequency: Every few days.  Management: Senna and Colace Scheduled Has PRN Sup and Mirlax .  Continent of Bladder: Yes.  Frequency: Anuria due to Dialysis.  Management: Pull Ups.  Pain is Managed:  Yes.  Management: Scheduled APAP Has PRN Oxycodone if needed.  Frequency of Intervention: Every 6 hours.  Adequately Controlled: Yes  Sleep: Adequate Sleep medications have been discontinued  Signs and Symptoms of Infection:  No.   Signs and Symptoms of Skin Breakdown:   Leave Prineo Mesh dressing in place, dry dressing PRN, Other areas are open to air  Injury and/or Falls during Inpatient Rehabilitation Admission: No  Anticoagulants: Heparin and Plavix  Diabetic: No  Consultations/Labs/X-rays: CT of Head and Labs  Oxygen while on IP Rehab:  No

## 2024-07-31 NOTE — PROGRESS NOTES
RECREATIONAL THERAPY  [facility]      Date:  7/31/2024            Patient Name: Deloris Watts           MRN: [id number]  Acct: [id number]          YOB: 1955 (68 y.o.)       Gender: female   Diagnosis: closed fracture of neck of right femur, initial encounter  Physician: Referring Practitioner: Jax Whatley MD    REASON FOR MISSED TREATMENT:  Will evaluate for RT tomorrow when able to tolerate     Elena Tan CTRS    7/31/2024

## 2024-07-31 NOTE — PLAN OF CARE
Individualized Plan of Care  [facility] Inpatient Rehabilitation Unit    Rehabilitation physician: Dr. Whatley    Admit Date: 7/30/2024     Rehabilitation Diagnosis: Right femoral fracture ([facility]) [S72.91XA]  Closed fracture of neck of right femur, initial encounter ([facility]) [S72.001A]      Rehabilitation impairments: self care, mobility, motor dysfunction, bowel/bladder management, pain management, safety, cognitive function, and communication    Factors facilitating achievement of predicted outcomes: Family support, Caregiver support, Knowledge about rehab, and Has homemaker services  Barriers to the achievement of predicted outcomes: Pain, Confusion, Cognitive deficit, Impulsivity, Limited safety awareness, Decreased motivation, Limited participation, Depression, Limited insight into deficits, Decreased endurance, Long standing deficits, Medical complications, Stairs at home, Skin Care, Wound Care, and Medication managment    Patient Goals: Improve independence with mobility, Improvement of mobility at a wheelchair level, Increase overall strength and endurance, Increase balance, Increase endurance, Increase independence with activities of daily living, Improve cognition, Increase self-awareness, Increase safety awareness, Increase community integration, Increase socialization, Functional communication with caregivers, Integrate appropriate pain management plan, Assure adequate nutritional option for discharge, Continence of bowel and bladder, and Provide appropriate patient and family education      NURSING:  Nursing goals for Deloris Watts while on the rehabilitation unit will include:  Continence of bowel and bladder, Adequate number of bowel movements, Urinate with no urinary retention >300ml in bladder, Complete bladder protocol with owusu removal, Maintain O2 SATs at an acceptable level during stay, Effective pain management while on the rehabilitation unit, Establish adequate pain control plan for

## 2024-07-31 NOTE — PROGRESS NOTES
[facility]  Individualized Disclosure Statement      Patient: Deloris Watts      Scope of Service  [facility] provides 24 hour individualized service to patients with functional limitations due to, but not limited to: stroke, brain injury, spinal cord injury, major multiple trauma, fractures, amputation, and neurological disorders. The Rehabilitation Center provides rehabilitative nursing and medical services as well as physical, occupational, speech, and recreation therapies.  [facility] is fully accredited by the Commission on Accreditation of Rehabilitation Facilities (CARF) as a comprehensive provider of rehabilitation services.  Patients admitted to the [facility] receive a minimum of three hours of therapy per day, at least five days per week, with a revised therapy schedule on weekends and holidays.  Physical therapy, occupational therapy, and speech therapy are provided seven days per week including holidays.  Other therapeutic services are available on weekends and evenings as needed or scheduled.  Intensity of Treatment  Your treatment program will consist of Nursing Care and:  1.5 hours of Physical Therapy, per day  1.5 hours of Occupational Therapy, per day   30-60 minutes of Speech Therapy, per day  1 hour of Recreational Therapy, per week  Depending on your needs, the exact time spent with each of the above disciplines may fluctuate, however you will receive at least 3 hours of therapy at least 5 days per week.    [facility] maintains contracts with most insurance plans.  Depending on the type of coverage, the insurance may impose limits on the coverage for rehabilitation care.  Coverage is based on the premise that you are able to fully participate in the rehabilitation program and show continued progress. Please verify your own insurance information. A

## 2024-07-31 NOTE — PLAN OF CARE
Problem: Discharge Planning  Goal: Discharge to home or other facility with appropriate resources  Flowsheets (Taken 7/30/2024 2240)  Discharge to home or other facility with appropriate resources: Identify barriers to discharge with patient and caregiver     Problem: Safety - Adult  Goal: Free from fall injury  Flowsheets (Taken 7/30/2024 2240)  Free From Fall Injury: Instruct family/caregiver on patient safety  Note: Patient has remained free of physical injury during this shift. Safe environment provided, call light within reach, and hourly rounding completed.      Problem: Pain  Goal: Verbalizes/displays adequate comfort level or baseline comfort level  Flowsheets (Taken 7/30/2024 2240)  Verbalizes/displays adequate comfort level or baseline comfort level:   Encourage patient to monitor pain and request assistance   Administer analgesics based on type and severity of pain and evaluate response   Assess pain using appropriate pain scale   Implement non-pharmacological measures as appropriate and evaluate response     Problem: ABCDS Injury Assessment  Goal: Absence of physical injury  Flowsheets (Taken 7/30/2024 2240)  Absence of Physical Injury: Implement safety measures based on patient assessment  Note: Patient has remained free of physical injury during this shift. Safe environment provided, call light within reach, and hourly rounding completed.    Care plan reviewed with patient.  Patient  verbalize understanding of the plan of care and contribute to goal setting.

## 2024-07-31 NOTE — CONSULTS
Hospitalist Consult Progress Note  Internal Medicine Resident      Patient: Deloris Watts 68 y.o. female      Unit/Bed: 8K-02/002-A  Admit Date: 7/30/2024       ASSESSMENT AND PLAN    Active Problems  Right femoral neck fracture, mechanical fall: X-ray hip 7/25: Acute fracture of the right femoral neck, diffuse osteopenia, left hip hemiarthroplasty.  Lactic acidosis: Downtrending.  0.6 on 7/31.  Continue to trend  ESRD: HD on Monday Wednesday Friday  Continue Renvela 800 mg twice daily  Physical deconditioning: Recent mechanical falls. BMI of 20.19  Continue Megace 40 mg daily  IPR  Chronic microcytic anemia: Baseline hemoglobin 10 with .  Likely secondary to ESRD.  5/6/2024 vitamin B12 363, Smith greater than 20.  7/29: Hemoglobin 9.8, hematocrit 30.4  Continue folic acid 500 mcg daily  Hyperphosphatemia:  Continue Renvela 800 mg twice daily  Hypertension:  Continue amlodipine 10 mg daily  Parkinson's:  Continue Sinemet  mg p.o. 3 times daily  Continue primidone 50 mg daily  MDD/GABBY:  Continue Zoloft 50 mg daily  Continue Wellbutrin 150 mg every morning and 300 mg daily  Continue trazodone 100 mg nightly  HLD  History of left parafalcine subdural hematoma:  Previously on Keppra, discontinued per neurology  History of ischemic CVA: Reported 15+ years ago.  Chronic left-sided weakness  Continue Plavix 75 mg daily  Metabolic acidosis: Resolved AG 7/30: 15    DVT prophylaxis: Heparin    Code Status: Full Code    Thank you, Jax Whatley MD, for the consultation.  Hospitalist service will gladly continue to follow along.      ===================================================================    Chief Complaint/Reason for Consult: Medical management    Hospital Course: Deloris Watts is a 68-year-old female with past medical history significant for prior CVA with left-sided deficits, ESRD, Parkinson's disease, hypertension, hyperlipidemia, anxiety/depression who presents to [facility] on 7/25 after a

## 2024-07-31 NOTE — PROGRESS NOTES
Pt has had increased confusion threw out the day.  She is able to state her name  and where she is. Unable to state the date.  She is also having some word salad when trying to have conversation.  She does have BUE tremors. She did work with therapy today yelled out when she was being moved or transferred.  She was moved to [facility] to be closer to the nurses station and a tele sitter was ordered due to her being impulsive.  She is not eating a lot and is now on a puree diet.  She is also unable to feed herself and needs to have someone feed her. She does like the magic cups and has had 3 of them through out the afternoon. She also has been eating ice chips.  She did pocket 1 pill this morning so she has been having her mouth check after taking meds to make sure she swallowed them. She did have lab work and a CT today to see if there is a reason for her increase confusion and weakness.

## 2024-07-31 NOTE — PLAN OF CARE
Problem: Discharge Planning  Goal: Discharge to home or other facility with appropriate resources  2024 1510 by Kusum Guajardo LISW  Note:   [facility]  Physical Medicine Case Management Assessment    [x] Inpatient Rehabilitation Unit    Patient Name: Deloris Watts        MRN: [id number]    : 1955  (68 y.o.)  Gender: female   Date of Admission: 2024  6:02 PM    Family/Social/Home Environment:   Prior to admission, patient was living with her , Tenzin. Patient was receiving assistance from Tenzin for her ADLs, housekeeping, meal prep, errands, finances and transportation. Patient is retired. Patient's main support is Tenzin. Patient has two children who are also supportive, but work full-time and have limited time for support. Patient's family physician is Johana Weldon MD. Patient prefers [facility]. Patient has a rolling walker, rollator walker, shower chair, wheelchair, lift chair and transport chair. Tenzin reports that patient was receiving home health services prior to admission, but is considering options like SNF at discharge.    Social/Functional History  Lives With: Spouse  Type of Home: House  Home Layout: One level, Able to Live on Main level with bedroom/bathroom, Performs ADL's on one level  Home Access: Ramped entrance (spouse assists pt up/down ramp with transport chair)  Bathroom Shower/Tub: Tub/Shower unit  Bathroom Toilet: Handicap height  Bathroom Equipment: Grab bars in shower, Shower chair  Bathroom Accessibility: Accessible  Home Equipment: Walker - Rolling, Walker - 4-Wheeled, Wheelchair - Manual, Lift chair, Reacher (transport chair)  Has the patient had two or more falls in the past year or any fall with injury in the past year?: Yes  Receives Help From: Family  ADL Assistance: Needs assistance  Homemaking Assistance: Needs assistance  Ambulation Assistance: Needs assistance  Transfer Assistance: Needs assistance  Active :  No  Patient's  Info: spouse performs transportation needs  Additional Comments: Pt's spouse assists with ADLs/IADLs, transfers and mobility tasks.    Contact/Guardian Information: Tenzin Wtats (spouse) [phone]    Community Resources Utilized: Patient was not using community resources prior to admission.    Sexuality/Intimacy: Patient did not disclose sexuality/intimacy concerns.    Complementary Health Approaches: Patient did not disclose desires towards complementary health approaches.     Anticipated Needs/Discharge Plans: SW will follow and maintain involvement in discharge planning.    SW met with patient's , Tenzin, on this date due to patient being in dialysis, to introduce self, complete SW assessment and initiate discharge planning. Prior to admission, patient was living with her , Tenzin. Patient was receiving assistance from Tenzin for her ADLs, housekeeping, meal prep, errands, finances and transportation. Patient is retired. Patient's main support is Tenzin. Patient has two children who are also supportive, but work full-time and have limited time for support. Patient's family physician is Johana Weldon MD. Patient prefers [facility]. Patient has a rolling walker, rollator walker, shower chair, wheelchair, lift chair and transport chair. Tenzin reports that patient was receiving home health services prior to admission, but is considering options like SNF at discharge. SW educated patient on Thursday, 8/1 team conference. SW will follow and maintain involvement in discharge planning.        Read-Only, Retired: Discharge Planning  Living Arrangements: Spouse/Significant Other  Support Systems: Spouse/Significant Other  Potential Assistance Needed: Durable Medical Equipment  M2B Y/N: Yes  Type of Home Care Services: PT, OT, Skilled Therapy  Patient expects to be discharged to:: House  Expected Discharge Date:  (Undetermined)      SHALINI To 7/31/2024 3:10 PM

## 2024-07-31 NOTE — PROGRESS NOTES
[facility]  SPEECH THERAPY  [facility]  Speech - Language - Cognitive Evaluation + Clinical Swallow Evaluation + Dysphagia Therapy    SLP Individual Minutes  Time In: 0830  Time Out: 09  Minutes: 35  Timed Code Treatment Minutes: 0 Minutes  Speech, Language, Cognitive Evaluation: 15 minutes  Clinical Swallow Evaluation: 10 minutes  Dysphagia Therapy: 10 minutes    DIET ORDER RECOMMENDATIONS AFTER EVALUATION: Puree Diet with Thin Liquids; Medications Whole in Puree, DIRECT 1:1 Supervision/Assistance    Date: 2024  Patient Name: Deloris Watts      CSN: [id number]   : 1955  (68 y.o.)  Gender: female   Referring Physician:  Jax Whatley MD   Diagnosis: Closed fracture of neck of right femur, initial encounter   Precautions: Fall Risk, Aspiration Risk  History of Present Illness/Injury: Patient admitted to [facility] with above diagnosis; please see physician H&P for full report. Per chart review, \"Deloris Watts is a 68 y.o. right-handed slim  female with history of hypertension, hyperlipidemia, hydronephrosis, fibromyalgia, irritable bowel syndrome, essential tremor, osteoporosis, end-stage renal disease on hemodialysis (MWF) since 2023, stroke with left side weakness in , anemia, parkinsonism, osteoporosis, depression, anxiety, right wrist and right ankle fracture treated conservatively, right proximal humeral fracture due to fall requiring ORIF, status post right carpal tunnel release surgery, status post cervical spine surgery, status post hysterectomy and bilateral oophorectomy, hemorrhoidectomy, sinus surgery, tubal ligation, L2 compression fracture requiring kyphoplasty, LASEK surgery, left femur neck displaced fracture due to fall on 3/10/2024 requiring left hip hemiarthroplasty on 3/11/2024, is admitted to the inpatient rehabilitation unit on 2024 for intensive inpatient rehabilitation treatment of impairment and  in Puree, DIRECT 1:1 Supervision/Assistance    STRATEGIES: Full Upright Position, Small Bite/Sip, Medications Whole with Puree, Alternate Solids and Liquids, Limit Distractions, and Monitor for Fatigue      ASPIRATION PNEUMONIA PREDICTORS:  Decreased Cognition, Limited Mobility, Chronic Medical Issues/Pertinent Co-Morbidities, Dependence for Feeding, and Dependence for Oral Care    FUNCTIONAL ORAL INTAKE SCALE: Total Oral Intake: 5.  Total oral intake of multiple consistencies requiring special preparation    Comprehension: 2 - Patient understands basic needs 25-49% of the time  Expression: 2 - Expresses basic ideas/needs 25-49% of the time  Social Interaction: 2 - Patient appropriate  25%-49% of the time  Problem Solvin - Patient solves simple/routine tasks < 25%  Memory: 1 - Patient remembers < 25% of the time    RECOMMENDATIONS/ASSESSMENT:  DIAGNOSTIC IMPRESSIONS:    Iaspjj-Vhlduzst-Tfysdhhgs Evaluation: Patient presents with severe-profound cognitive-linguistic impairment evidenced by aforementioned deficits. Speech and voice appear to be WFL with no presence of dysarthria, dysphonia, or aphonia; patient intelligible at the conversation level with approximately 100% accuracy despite presence of reduced vocal intensity. Skilled ST services are recommended at this time in order to improve the aforementioned deficits and permit potential return to PLOF. Anticipate needs for 24/7 supervision and ongoing skilled ST services (pending progress) via  services at discharge.       Clinical Swallow Evaluation: Patient presents with at least mild oral dysphagia with inability to fully discern potential presence of pharyngeal phase deficits without formal instrumentation. Upon ST arrival to patient room, patient with REGULAR texture breakfast meal from family to include: chao, pancake, scrambled eggs with sausage gravy, and thin via straw; patient's daughter with provision of ice chips via teaspoon with immediate

## 2024-07-31 NOTE — PROGRESS NOTES
Shift Bilaterally, Decreased Gait Speed, Decreased Heel Strike Bilaterally, Narrow Base of Support, and Unsteady Gait     Current functional status for bladder management: Minimal contact assistance     Current functional status for bowel management:Minimal contact assistance     Current functional status for comprehension: Supervision     Current functional status for expression: Supervision     Current functional status for social interaction: Supervision     Current functional status for problem solving: Supervision     Current functional status for memory: Supervision     Expected level of Improvement in Self-Care:  Modified independence     Expected level of Improvement in Sphincter Control:  Modified independence     Expected level of Improvement in Transfers: Modified independence     Expected level of Improvement in Locomotion:  Modified independence     Expected level of Improvement in Communication and Social Cognition: Modified independence     Expected length of time to achieve that level of improvement: 2 weeks     Current rehab issues: ADL dysfunction,bladder management,bowel management,carry over of therapy techniques, discharge planning, disease and co-morbidity management, gait/mobility dysfunction, medication management, nutrition and hydration management,Ongoing assessment of safety, Pain management, Patient and family education, Prevention of secondary complications, Skin Integrity, NWB,TTWB, PWB,cognitive impairment, communication impairment.     Required therapy: Physical Therapy, Occupational Therapy and Speech Therapy 3 hours per day, 5-6 days per week.  Recreational Therapy 1 hour per week.     Expected Discharge Destination: Home     Expected Post Discharge Treatments: Out Patient     Other information relevant to the care needs:   Lives With: Spouse  Type of Home: House  Home Layout: One level, Able to Live on Main level with bedroom/bathroom  Home Access: Ramped entrance (Pt has a ramp in

## 2024-07-31 NOTE — PROGRESS NOTES
Physical Medicine & Rehabilitation Progress Note    Chief Complaint:  Right hip femoral neck fracture due to fall requiring right hemiarthroplasty surgery     Subjective:    Deloris Watts is a 68 y.o. right-handed slim  female with history of hypertension, hyperlipidemia, hydronephrosis, fibromyalgia, irritable bowel syndrome, essential tremor, osteoporosis, end-stage renal disease on hemodialysis (MWF) since January 2023, stroke with left side weakness in 1990s, anemia, parkinsonism, osteoporosis, depression, anxiety, right wrist and right ankle fracture treated conservatively, right proximal humeral fracture due to fall requiring ORIF, status post right carpal tunnel release surgery, status post cervical spine surgery, status post hysterectomy and bilateral oophorectomy, hemorrhoidectomy, sinus surgery, tubal ligation, L2 compression fracture requiring kyphoplasty, LASEK surgery, left femur neck displaced fracture due to fall on 3/10/2024 requiring left hip hemiarthroplasty on 3/11/2024, was admitted on 7/30/2024 for intensive inpatient management of impairment & disability secondary to right hip femur neck fracture due to fall requiring right hemiarthroplasty surgery.     The patient was known to inpatient rehab service.  She was previously admitted to inpatient rehab service on 5/29/2024 for small acute left parafalcine subdural hematoma and cerebral concussion secondary to fall accident on 5/25/2024.  She was discharged home on 6/15/2024 with referral for home care service.     The patient recently was hospitalized from 7/8/2024 to 7/11/2024 for headache.  At that time MRI of brain was performed on 7/9/2024 and showed no acute infarction but old right frontal lobe infarct.     The patient says she was going to bathroom at that time with her walker on 7/25/2024.  She left the walker outside the bathroom and went in without the walker.  Her  was in the bedroom at that time.  The patient  symptoms of constipation   and case management for coordination of care and discharge planning      Missed Therapy Time:  The patient missed 34 minutes OT therapy on 8/1/2024 due to agitation & uncooperation.      ANTON DAVIS MD     This report has been created using voice recognition software. It may contain minor errors which are inherent in voice recognition technology

## 2024-07-31 NOTE — CONSULTS
Comprehensive Nutrition Assessment    Type and Reason for Visit: Initial, Consult (General Nutrition)    Nutrition Recommendations/Plan:   Initiate Calorie Count to better determine if tube feeds will be needed. Spoke to Moisés BENÍTEZ and Juan RN regarding plan for calorie count. RD posted calorie count folder to patient's room door.   Continue diet as ordered per SLP recommendations.   Continue Magic Cups (2 per tray) TID and Ensure Plus with breakfast.   Continue appetite stimulant.   Recommend MVI.      Malnutrition Assessment:  Malnutrition Status: Severe malnutrition  Context: Chronic Illness       Findings of the 6 clinical characteristics of malnutrition:  Energy Intake:  75% or less estimated energy requirements for 1 month or longer  Weight Loss:  No significant weight loss     Body Fat Loss:  Severe body fat loss Orbital, Triceps, Buccal region   Muscle Mass Loss:  Severe muscle mass loss Temples (temporalis)  Fluid Accumulation:  No significant fluid accumulation     Strength:  Not Performed    Nutrition Assessment:    Pt. severely malnourished AEB criteria listed above. At risk for further nutritional compromise r/t admitted to [facility] s/p fall resulting in right hip fracture- went to the OR on 7/26. MBS on 7/30 recommended pureed with thin liquids. Noted underlying medical condition (PMHx anemia in CKD- on HD, started in 1/2023, CHF, CVA in 1990s, depression, fibromyalgia, HLD, HTN, IBS, osteoporosis).    Nutrition Related Findings:    Pt. Report/Treatments/Miscellaneous: Patient seen, laying in bed. RD spoke with daughter. Per daughter patient has had a decrease in appetite for the past few days- since her fall on 7/25. Prior to that she was eating 3 meals per day well, no issues. Daughter reports that patient typically has some constipation at baseline. Daughter confirms that patient really likes Magic Cups and that she eats them at home. Daughter is hesitant to consider an NG tube at this point as

## 2024-07-31 NOTE — PLAN OF CARE
Progressing  Flowsheets (Taken 7/31/2024 1327)  Nutrient intake appropriate for improving, restoring, or maintaining nutritional needs:   Assess nutritional status and recommend course of action   Monitor oral intake, labs, and treatment plans

## 2024-07-31 NOTE — CARE COORDINATION
Patient took medications well with applesauce with no difficulty.  stated he will bring medication list tomorrow am. States patient does not take melatonin and if patient still awake by 2300 ok to give Trazodone then.

## 2024-08-01 LAB
ANION GAP SERPL CALC-SCNC: 17 MEQ/L (ref 8–16)
AUTO DIFF PNL BLD: ABNORMAL
BASOPHILS ABSOLUTE: 0.1 THOU/MM3 (ref 0–0.1)
BASOPHILS NFR BLD AUTO: 0.6 %
BUN SERPL-MCNC: 38 MG/DL (ref 7–22)
CALCIUM SERPL-MCNC: 9.3 MG/DL (ref 8.5–10.5)
CHLORIDE SERPL-SCNC: 95 MEQ/L (ref 98–111)
CO2 SERPL-SCNC: 25 MEQ/L (ref 23–33)
CREAT SERPL-MCNC: 3.5 MG/DL (ref 0.4–1.2)
DEPRECATED RDW RBC AUTO: 59.6 FL (ref 35–45)
EOSINOPHIL NFR BLD AUTO: 3.3 %
EOSINOPHILS ABSOLUTE: 0.3 THOU/MM3 (ref 0–0.4)
ERYTHROCYTE [DISTWIDTH] IN BLOOD BY AUTOMATED COUNT: 16.4 % (ref 11.5–14.5)
GFR SERPL CREATININE-BSD FRML MDRD: 13 ML/MIN/1.73M2
GLUCOSE SERPL-MCNC: 88 MG/DL (ref 70–108)
HCT VFR BLD AUTO: 36.1 % (ref 37–47)
HGB BLD-MCNC: 11.5 GM/DL (ref 12–16)
IMM GRANULOCYTES # BLD AUTO: 0.93 THOU/MM3 (ref 0–0.07)
IMM GRANULOCYTES NFR BLD AUTO: 8.8 %
LACTATE SERPL-SCNC: 0.9 MMOL/L (ref 0.5–2)
LYMPHOCYTES ABSOLUTE: 1 THOU/MM3 (ref 1–4.8)
LYMPHOCYTES NFR BLD AUTO: 9.8 %
MCH RBC QN AUTO: 31.3 PG (ref 26–33)
MCHC RBC AUTO-ENTMCNC: 31.9 GM/DL (ref 32.2–35.5)
MCV RBC AUTO: 98.4 FL (ref 81–99)
MONOCYTES ABSOLUTE: 1.1 THOU/MM3 (ref 0.4–1.3)
MONOCYTES NFR BLD AUTO: 10.7 %
NEUTROPHILS ABSOLUTE: 7 THOU/MM3 (ref 1.8–7.7)
NEUTROPHILS NFR BLD AUTO: 66.8 %
NRBC BLD AUTO-RTO: 0 /100 WBC
PATHOLOGIST REVIEW: ABNORMAL
PLATELET # BLD AUTO: 374 THOU/MM3 (ref 130–400)
PLATELET BLD QL SMEAR: ADEQUATE
PMV BLD AUTO: 11.1 FL (ref 9.4–12.4)
POIKILOCYTES: ABNORMAL
POTASSIUM SERPL-SCNC: 4 MEQ/L (ref 3.5–5.2)
PROCALCITONIN SERPL IA-MCNC: 1.25 NG/ML (ref 0.01–0.09)
RBC # BLD AUTO: 3.67 MILL/MM3 (ref 4.2–5.4)
SCAN OF BLOOD SMEAR: NORMAL
SODIUM SERPL-SCNC: 137 MEQ/L (ref 135–145)
WBC # BLD AUTO: 10.5 THOU/MM3 (ref 4.8–10.8)

## 2024-08-01 PROCEDURE — 92526 ORAL FUNCTION THERAPY: CPT

## 2024-08-01 PROCEDURE — 99232 SBSQ HOSP IP/OBS MODERATE 35: CPT | Performed by: INTERNAL MEDICINE

## 2024-08-01 PROCEDURE — 99232 SBSQ HOSP IP/OBS MODERATE 35: CPT | Performed by: PHYSICAL MEDICINE & REHABILITATION

## 2024-08-01 PROCEDURE — 1180000000 HC REHAB R&B

## 2024-08-01 PROCEDURE — 97535 SELF CARE MNGMENT TRAINING: CPT

## 2024-08-01 PROCEDURE — 85025 COMPLETE CBC W/AUTO DIFF WBC: CPT

## 2024-08-01 PROCEDURE — 97129 THER IVNTJ 1ST 15 MIN: CPT

## 2024-08-01 PROCEDURE — 83605 ASSAY OF LACTIC ACID: CPT

## 2024-08-01 PROCEDURE — 97530 THERAPEUTIC ACTIVITIES: CPT

## 2024-08-01 PROCEDURE — 6370000000 HC RX 637 (ALT 250 FOR IP): Performed by: PHYSICAL MEDICINE & REHABILITATION

## 2024-08-01 PROCEDURE — 2580000003 HC RX 258: Performed by: PHYSICAL MEDICINE & REHABILITATION

## 2024-08-01 PROCEDURE — 6360000002 HC RX W HCPCS: Performed by: PHYSICAL MEDICINE & REHABILITATION

## 2024-08-01 PROCEDURE — 97110 THERAPEUTIC EXERCISES: CPT

## 2024-08-01 PROCEDURE — 84145 PROCALCITONIN (PCT): CPT

## 2024-08-01 PROCEDURE — 80048 BASIC METABOLIC PNL TOTAL CA: CPT

## 2024-08-01 PROCEDURE — 36415 COLL VENOUS BLD VENIPUNCTURE: CPT

## 2024-08-01 RX ORDER — TRAZODONE HYDROCHLORIDE 50 MG/1
50 TABLET ORAL NIGHTLY
Status: DISCONTINUED | OUTPATIENT
Start: 2024-08-01 | End: 2024-08-01

## 2024-08-01 RX ORDER — TRAZODONE HYDROCHLORIDE 100 MG/1
100 TABLET ORAL NIGHTLY
Status: DISCONTINUED | OUTPATIENT
Start: 2024-08-01 | End: 2024-08-09 | Stop reason: HOSPADM

## 2024-08-01 RX ORDER — LANOLIN ALCOHOL/MO/W.PET/CERES
6 CREAM (GRAM) TOPICAL NIGHTLY
Status: DISCONTINUED | OUTPATIENT
Start: 2024-08-01 | End: 2024-08-01

## 2024-08-01 RX ADMIN — SENNOSIDES 8.6 MG: 8.6 TABLET, FILM COATED ORAL at 22:03

## 2024-08-01 RX ADMIN — ACETAMINOPHEN 650 MG: 325 TABLET ORAL at 01:40

## 2024-08-01 RX ADMIN — ACETAMINOPHEN 650 MG: 325 TABLET ORAL at 17:37

## 2024-08-01 RX ADMIN — CARBIDOPA AND LEVODOPA 1 TABLET: 25; 100 TABLET ORAL at 11:29

## 2024-08-01 RX ADMIN — CLOPIDOGREL BISULFATE 75 MG: 75 TABLET ORAL at 11:30

## 2024-08-01 RX ADMIN — SEVELAMER CARBONATE 800 MG: 800 TABLET, FILM COATED ORAL at 11:30

## 2024-08-01 RX ADMIN — DOCUSATE SODIUM 100 MG: 100 CAPSULE, LIQUID FILLED ORAL at 11:30

## 2024-08-01 RX ADMIN — CARBIDOPA AND LEVODOPA 1 TABLET: 10; 100 TABLET ORAL at 17:37

## 2024-08-01 RX ADMIN — TRAZODONE HYDROCHLORIDE 100 MG: 100 TABLET ORAL at 22:03

## 2024-08-01 RX ADMIN — PRIMIDONE 50 MG: 50 TABLET ORAL at 11:30

## 2024-08-01 RX ADMIN — BUPROPION HYDROCHLORIDE 300 MG: 300 TABLET, EXTENDED RELEASE ORAL at 11:32

## 2024-08-01 RX ADMIN — BUPROPION HYDROCHLORIDE 150 MG: 150 TABLET, EXTENDED RELEASE ORAL at 11:30

## 2024-08-01 RX ADMIN — MEGESTROL ACETATE 40 MG: 40 TABLET ORAL at 22:13

## 2024-08-01 RX ADMIN — CARBIDOPA AND LEVODOPA 1 TABLET: 25; 100 TABLET ORAL at 06:54

## 2024-08-01 RX ADMIN — HEPARIN SODIUM 5000 UNITS: 5000 INJECTION INTRAVENOUS; SUBCUTANEOUS at 11:31

## 2024-08-01 RX ADMIN — OXYCODONE 5 MG: 5 TABLET ORAL at 06:51

## 2024-08-01 RX ADMIN — ACETAMINOPHEN 650 MG: 325 TABLET ORAL at 06:51

## 2024-08-01 RX ADMIN — FLUTICASONE PROPIONATE 2 SPRAY: 50 SPRAY, METERED NASAL at 11:31

## 2024-08-01 RX ADMIN — HEPARIN SODIUM 5000 UNITS: 5000 INJECTION INTRAVENOUS; SUBCUTANEOUS at 22:03

## 2024-08-01 RX ADMIN — ACETAMINOPHEN 650 MG: 325 TABLET ORAL at 11:29

## 2024-08-01 RX ADMIN — SEVELAMER CARBONATE 800 MG: 800 TABLET, FILM COATED ORAL at 17:37

## 2024-08-01 RX ADMIN — MEGESTROL ACETATE 40 MG: 40 TABLET ORAL at 11:30

## 2024-08-01 RX ADMIN — ACETAMINOPHEN 650 MG: 325 TABLET ORAL at 23:43

## 2024-08-01 RX ADMIN — FOLIC ACID 500 MCG: 1 TABLET ORAL at 11:30

## 2024-08-01 RX ADMIN — SERTRALINE 50 MG: 50 TABLET, FILM COATED ORAL at 11:30

## 2024-08-01 RX ADMIN — DOCUSATE SODIUM 100 MG: 100 CAPSULE, LIQUID FILLED ORAL at 22:03

## 2024-08-01 RX ADMIN — SODIUM CHLORIDE, PRESERVATIVE FREE 10 ML: 5 INJECTION INTRAVENOUS at 22:15

## 2024-08-01 RX ADMIN — AMLODIPINE BESYLATE 10 MG: 10 TABLET ORAL at 11:29

## 2024-08-01 ASSESSMENT — PAIN DESCRIPTION - PAIN TYPE
TYPE: SURGICAL PAIN
TYPE: SURGICAL PAIN

## 2024-08-01 ASSESSMENT — PAIN SCALES - GENERAL
PAINLEVEL_OUTOF10: 4
PAINLEVEL_OUTOF10: 8
PAINLEVEL_OUTOF10: 9
PAINLEVEL_OUTOF10: 6
PAINLEVEL_OUTOF10: 8

## 2024-08-01 ASSESSMENT — PAIN DESCRIPTION - DESCRIPTORS
DESCRIPTORS: ACHING;DISCOMFORT
DESCRIPTORS: THROBBING
DESCRIPTORS: THROBBING

## 2024-08-01 ASSESSMENT — PAIN DESCRIPTION - ORIENTATION
ORIENTATION: RIGHT

## 2024-08-01 ASSESSMENT — PAIN DESCRIPTION - LOCATION
LOCATION: HIP

## 2024-08-01 ASSESSMENT — PAIN - FUNCTIONAL ASSESSMENT
PAIN_FUNCTIONAL_ASSESSMENT: ACTIVITIES ARE NOT PREVENTED

## 2024-08-01 ASSESSMENT — PAIN DESCRIPTION - DIRECTION: RADIATING_TOWARDS: DOWN LEG

## 2024-08-01 ASSESSMENT — PAIN DESCRIPTION - ONSET: ONSET: ON-GOING

## 2024-08-01 ASSESSMENT — PAIN DESCRIPTION - FREQUENCY: FREQUENCY: CONTINUOUS

## 2024-08-01 NOTE — PROGRESS NOTES
Comprehensive Nutrition Assessment    Type and Reason for Visit:  Reassess (calorie count)    Nutrition Recommendations/Plan:   Recommend diet as per SLP.  Continue to send Magic Cup ONS x 2 TID.  Continue calorie count - pt. Consumed ~ 1240 kcals, 50 grams protein in past 24 hours (with majority of intake from Magic Cup ONS).  Agree w/ appetite stimulant - dose increased to BID.  Recommend continue to increase does as needed for additional appetite stimulation.  Recommend MVI.  Rx per provider to assist with constipation.     Malnutrition Assessment:  Malnutrition Status:  Severe malnutrition (07/31/24 1152)    Context:  Chronic Illness     Findings of the 6 clinical characteristics of malnutrition:  Energy Intake:  75% or less estimated energy requirements for 1 month or longer  Weight Loss:  No significant weight loss     Body Fat Loss:  Severe body fat loss Orbital, Triceps, Buccal region   Muscle Mass Loss:  Severe muscle mass loss Temples (temporalis)  Fluid Accumulation:  No significant fluid accumulation     Strength:  Not Performed    Nutrition Assessment:     Pt. severely malnourished AEB criteria listed above. At risk for further nutritional compromise r/t admitted to [facility] s/p fall resulting in right hip fracture- went to the OR on 7/26. MBS on 7/30 recommended pureed with thin liquids; increased nutrient needs for wound healing; known losses with dialysis. Noted underlying medical condition (PMHx anemia in CKD- on HD, started in 1/2023, CHF, CVA in 1990s, depression, fibromyalgia, HLD, HTN, IBS, osteoporosis).     Nutrition Related Findings:    Pt. Report/Treatments/Miscellaneous:   8/1: pt. Seen this am; confusion waxes and wanes per staff; pt. U/a to discuss po or appetite; calorie count tray tickets reviewed - minimal po intake (except for consuming ~4 Magic Cups past 24 hours); SLP following; staff concerned that family bringing in food for pt (? Compliance w/ diet) - SLP educating spouse; per

## 2024-08-01 NOTE — PROGRESS NOTES
[facility]   Nephrology progress note  8/1/2024, 10:20 AM      Pt Name:    Deloris Watts  MRN:     [id number]     YOB: 1955  Admit Date:    7/30/2024  6:02 PM    Chief Complaint: Nephrology following for ESRD    Subjective:  Patient seen and examined  Feels okay no complaints   at bedside  Mental status doing well    Objective:  24HR INTAKE/OUTPUT:    Intake/Output Summary (Last 24 hours) at 8/1/2024 1020  Last data filed at 8/1/2024 0950  Gross per 24 hour   Intake 880 ml   Output 400 ml   Net 480 ml      Admission weight: 49.1 kg (108 lb 3.9 oz)  Wt Readings from Last 3 Encounters:   07/31/24 46.8 kg (103 lb 2.8 oz)   07/29/24 48.7 kg (107 lb 5.8 oz)   07/10/24 46.6 kg (102 lb 11.8 oz)        Vitals :   Vitals:    07/31/24 2028 07/31/24 2108 07/31/24 2138 08/01/24 0721   BP: (!) 142/83      Pulse: 92      Resp: 16 16 16 18   Temp: 98.6 °F (37 °C)      TempSrc: Oral      SpO2: 99%      Weight:       Height:           Physical examination  General Appearance:  appears comfortable, no distress  Neck: No JVD noted  Lungs clear  Heart: S1-S2  Psych: Not agitated  CNS grossly intact    Medications:  Infusion:    sodium chloride       Meds:    traZODone  50 mg Oral Nightly    megestrol  40 mg Oral BID    carbidopa-levodopa  1 tablet Oral TID    buPROPion  150 mg Oral QAM    buPROPion  300 mg Oral Daily    docusate sodium  100 mg Oral BID    fluticasone  2 spray Each Nostril Daily    folic acid  500 mcg Oral Daily    primidone  50 mg Oral Daily    senna  1 tablet Oral Nightly    sevelamer  800 mg Oral BID with meals    sertraline  50 mg Oral Daily    amLODIPine  10 mg Oral Daily    sodium chloride flush  5-40 mL IntraVENous 2 times per day    clopidogrel  75 mg Oral Daily    acetaminophen  650 mg Oral Q6H    heparin (porcine)  5,000 Units SubCUTAneous BID     Lab Data :  CBC:   Recent Labs     07/30/24  0604 07/31/24  0648 08/01/24  0821   WBC 11.0* 9.2 10.5   HGB 10.7* 11.1*

## 2024-08-01 NOTE — PROGRESS NOTES
Physical Medicine & Rehabilitation Progress Note    Chief Complaint:  Right hip femoral neck fracture due to fall requiring right hemiarthroplasty surgery     Subjective:    Deloris Watts is a 68 y.o. right-handed slim  female with history of hypertension, hyperlipidemia, hydronephrosis, fibromyalgia, irritable bowel syndrome, essential tremor, osteoporosis, end-stage renal disease on hemodialysis (MWF) since January 2023, stroke with left side weakness in 1990s, anemia, parkinsonism, osteoporosis, depression, anxiety, right wrist and right ankle fracture treated conservatively, right proximal humeral fracture due to fall requiring ORIF, status post right carpal tunnel release surgery, status post cervical spine surgery, status post hysterectomy and bilateral oophorectomy, hemorrhoidectomy, sinus surgery, tubal ligation, L2 compression fracture requiring kyphoplasty, LASEK surgery, left femur neck displaced fracture due to fall on 3/10/2024 requiring left hip hemiarthroplasty on 3/11/2024, was admitted on 7/30/2024 for intensive inpatient management of impairment & disability secondary to right hip femur neck fracture due to fall requiring right hemiarthroplasty surgery.     The patient was known to inpatient rehab service.  She was previously admitted to inpatient rehab service on 5/29/2024 for small acute left parafalcine subdural hematoma and cerebral concussion secondary to fall accident on 5/25/2024.  She was discharged home on 6/15/2024 with referral for home care service.     The patient recently was hospitalized from 7/8/2024 to 7/11/2024 for headache.  At that time MRI of brain was performed on 7/9/2024 and showed no acute infarction but old right frontal lobe infarct.     The patient says she was going to bathroom at that time with her walker on 7/25/2024.  She left the walker outside the bathroom and went in without the walker.  Her  was in the bedroom at that time.  The patient  Panel    Collection Time: 08/01/24  8:22 AM   Result Value Ref Range    Sodium 137 135 - 145 meq/L    Potassium 4.0 3.5 - 5.2 meq/L    Chloride 95 (L) 98 - 111 meq/L    CO2 25 23 - 33 meq/L    Glucose 88 70 - 108 mg/dL    BUN 38 (H) 7 - 22 mg/dL    Creatinine 3.5 (HH) 0.4 - 1.2 mg/dL    Calcium 9.3 8.5 - 10.5 mg/dL   Procalcitonin    Collection Time: 08/01/24  8:22 AM   Result Value Ref Range    Procalcitonin 1.25 (H) 0.01 - 0.09 ng/mL   Anion Gap    Collection Time: 08/01/24  8:22 AM   Result Value Ref Range    Anion Gap 17.0 (H) 8.0 - 16.0 meq/L   Glomerular Filtration Rate, Estimated    Collection Time: 08/01/24  8:22 AM   Result Value Ref Range    Est, Glom Filt Rate 13 (A) >60 ml/min/1.73m2       Impression:  Acute right femoral neck fracture due to fall accident requiring right hemiarthroplasty surgery  History of stroke (right frontal lobe infarct) with left hemiparesis  Mental status change with worsening of cognition and mentation, to rule out intracranial abnormality, to rule out sepsis  End-stage renal disease requiring hemodialysis (Monday, Wednesday and Friday)  History of fall resulting liver laceration and acute L2 compression fracture requiring kyphoplasty   History of fall on 3/10/2024 resulting displaced left femur neck fracture requiring left hip hemiarthroplasty surgery  Parkinsonism  Essential hypertension  Oropharyngeal dysphagia  Irritable bowel syndrome  Hyperlipidemia  Osteoporosis  Essential tremor  GERD  Cognitive impairment  History of hydronephrosis  History of fibromyalgia  History of depression and anxiety  History of right proximal humerus fracture requiring ORIF  History of right wrist fracture requiring casting  History of right ankle fracture requiring casting     The patient appeared to be more calm today.  Increasing Sinemet dosage apparently produced agitated personality change that is aggressive and abusive.  Therefore we will maintain Sinemet at the current dosage of .

## 2024-08-01 NOTE — PLAN OF CARE
Problem: Safety - Adult  Goal: Free from fall injury  8/1/2024 0027 by Yessica Fu LPN  Outcome: Progressing  Flowsheets (Taken 8/1/2024 0027)  Free From Fall Injury: Instruct family/caregiver on patient safety     Problem: Pain  Goal: Verbalizes/displays adequate comfort level or baseline comfort level  8/1/2024 0027 by Yessica Fu LPN  Outcome: Progressing  Flowsheets (Taken 8/1/2024 0027)  Verbalizes/displays adequate comfort level or baseline comfort level:   Encourage patient to monitor pain and request assistance   Assess pain using appropriate pain scale   Administer analgesics based on type and severity of pain and evaluate response   Implement non-pharmacological measures as appropriate and evaluate response     Problem: Skin/Tissue Integrity  Goal: Absence of new skin breakdown  Description: 1.  Monitor for areas of redness and/or skin breakdown  2.  Assess vascular access sites hourly  3.  Every 4-6 hours minimum:  Change oxygen saturation probe site  4.  Every 4-6 hours:  If on nasal continuous positive airway pressure, respiratory therapy assess nares and determine need for appliance change or resting period.  8/1/2024 0027 by Yessica Fu LPN  Outcome: Progressing     Problem: Chronic Conditions and Co-morbidities  Goal: Patient's chronic conditions and co-morbidity symptoms are monitored and maintained or improved  8/1/2024 0027 by Yessica Fu LPN  Outcome: Progressing  Flowsheets (Taken 8/1/2024 0027)  Care Plan - Patient's Chronic Conditions and Co-Morbidity Symptoms are Monitored and Maintained or Improved: Monitor and assess patient's chronic conditions and comorbid symptoms for stability, deterioration, or improvement     Problem: Nutrition Deficit:  Goal: Optimize nutritional status  8/1/2024 0027 by Yessica Fu LPN  Outcome: Progressing  Flowsheets (Taken 8/1/2024 0027)  Nutrient intake appropriate for improving, restoring, or maintaining nutritional needs:

## 2024-08-01 NOTE — PROGRESS NOTES
Accessibility: Accessible    Receives Help From: Family  ADL Assistance: Needs assistance  Homemaking Assistance: Needs assistance  Ambulation Assistance: Needs assistance  Transfer Assistance: Needs assistance    Active : No  Patient's  Info: spouse performs transportation needs     Additional Comments: Pt's spouse assists with ADLs/IADLs, transfers and mobility tasks.    SUBJECTIVE: Upon arrival pt resting in bed and initially agrees to OT session.  Pt's spouse reports pt's \"disposition\" this date is not great.  Pt. Agitated at times with spouse.  Pt. Yelling out in pain during movement.      PAIN: Pt. Reports pain in R hip and R knee, OT provided pt with ice for R knee with Dr. Whatley approval and nursing aware at end of session to improve pain/comfort.     Vitals: Vitals not assessed per clinical judgement, see nursing flowsheet    COGNITION: Decreased Recall, Decreased Insight, Decreased Problem Solving, Decreased Safety Awareness, Impaired Attention, Difficulty Following Commands, and Impulsive    ADL:   Pt. Had some of her lunch still present and pt unable to tolerate eating at EOB therefore, OT improved HOB positioning to provide more of an upright position.  Pt. Required mod A for self feed this date and cues to initiate, pt did not eat much as she reports \"If I eat anymore I will be sick\", but was able to complete beverage management with SBA.    Pt. Provided with warm wash cloth to wash face, pt able to complete while supine with HOB elevated with SBA and min cues for technique and attention to task.      IADL:   Not Tested    BALANCE:  Sitting Balance:  Moderate Assistance-Maximum Assistance, X 1.  Completed x2 trials during session, pt then began laying self back down stating she was in too much pain and felt like she was going to get sick.  Pt. Declined any OOB activity or EOB activity despite education provided.     BED MOBILITY:  Supine to Sit: Maximum Assistance, X 1, with verbal cues ,  Index.    Following session, patient left in safe position with all fall risk precautions in place.

## 2024-08-01 NOTE — PROGRESS NOTES
[facility]  INPATIENT PHYSICAL THERAPY  DAILY NOTE  [facility] - [id number]    Time In: 1040  Time Out: 1145  Timed Code Treatment Minutes: 25 Minutes  Minutes: 65          Date: 2024  Patient Name: Deloris Watts,  Gender:  female        MRN: [id number]  : 1955  (68 y.o.)     Referring Practitioner: Jax Whatley MD  Diagnosis: Closed fracture of neck of right demu, initial encounter ([facility])  Additional Pertinent Hx: Per EMR: \" Deloris is a 68 year old female whom has been in/out of hospital for quite some time with varying medical conditions.  Most recently pt re-addmitted back to the hosital due to a fall in the home environment in which resulting in pain in LE with imaging indicating an acute fracture in the R femoral neck with diffuse osteopenia.  Pt. underwent open treatment to R femoral neck fracture with prothesis on 24 and is WBAT with hip precautions in place. See physicans H&P for additional information.\"     Prior Level of Function:  Lives With: Spouse  Type of Home: House  Home Layout: One level, Able to Live on Main level with bedroom/bathroom, Performs ADL's on one level  Home Access: Ramped entrance (spouse assists pt up/down ramp with transport chair)  Home Equipment: Walker - Rolling, Walker - 4-Wheeled, Wheelchair - Manual, Lift chair, Reacher (transport chair)   Bathroom Shower/Tub: Tub/Shower unit  Bathroom Toilet: Handicap height  Bathroom Equipment: Grab bars in shower, Shower chair  Bathroom Accessibility: Accessible    Receives Help From: Family  ADL Assistance: Needs assistance  Homemaking Assistance: Needs assistance  Ambulation Assistance: Needs assistance  Transfer Assistance: Needs assistance  Active : No  Additional Comments: Pt's spouse assists with ADLs/IADLs, transfers and mobility tasks.    Restrictions/Precautions:  Restrictions/Precautions: Fall Risk, General Precautions, Weight Bearing, Surgical protocol  Right  with maxA x 1 for improved functional mobility  Short Term Goal 4: Pt will perform bed <> chair stand pivot transfers with modA for ability to transfer to various surfaces.  Short Term Goal 5: PT to assess ambulation as able.  Long Term Goals  Time Frame for Long Term Goals : 3 weeks  Long Term Goal 1: Pt will perform supine <> sitting EOB with modA for ability to get in and out of bed  Long Term Goal 2: Pt will perform rolling L <> R without bed rails and with IND to reposition in bed  Long Term Goal 3: Pt will perform bed <> chair transfers with use of RW and Celine to transfer to various surfaces  Long Term Goal 4: Pt will perform car transfer with modA for ability to get in and out of the car.  Long Term Goal 5: Pt will tolerate standing > 5min to RW or other support to demo improved tolerance to WB through RLE.    Following session, patient left in safe position with all fall risk precautions in place.

## 2024-08-01 NOTE — PLAN OF CARE
Problem: Discharge Planning  Goal: Discharge to home or other facility with appropriate resources  Note: Team conference held Thursday, 8/1. Recommendations of the team were explained to the patient and her , Tenizn by Dr Whatley and VARSHA. Team is recommending that patient continue on acute inpatient rehab for PT, OT and ST, with an undetermined discharge date. Following discharge, team is recommending SNF. SW discussed concern on patient's tolerance of 3 hours of therapy. Tenzin reported understanding. The plan is to see how the next few days go and reconvene on Monday, 8/5 to determine a discharge plan. Care plan reviewed with patient and Tenzin. Patient and Tenzin verbalized understanding of the plan of care and contributed to goal setting. SW offered to arranged a meeting with palliative care and family. Tenzin has not discussed this with the children yet. SW to follow and maintain involvement in discharge planning.

## 2024-08-01 NOTE — PROGRESS NOTES
Pt was moved to room 3 this morning via wheelchair.  She did have therapy and they where able to transfer her to the chair.  She has been struggling with movement and transfers.  She used a lionel steady and was pushing away from staff and not working with putting weight on her leg.  She has been yelling at staff and her  being very short tempered. She is still refusing to eat more than a few bites at each meal.  Attempting to have staff feed her with no luck.  has been at bed side most of the day.  She has also been picking at her dressing and attempting to pull it off.  She his starting to get upset with her medications and trying to spit them out or to pocket them.  Every time someone has tried to do something with her she says she just wants to sleep.

## 2024-08-01 NOTE — PROGRESS NOTES
[facility]  INPATIENT PHYSICAL THERAPY  DAILY NOTE  [facility] - [id number]    Time In: 0735  Time Out: 0800  Timed Code Treatment Minutes: 25 Minutes  Minutes: 25          Date: 2024  Patient Name: Deloris Watts,  Gender:  female        MRN: [id number]  : 1955  (68 y.o.)     Referring Practitioner: Jax Whatley MD  Diagnosis: Closed fracture of neck of right demu, initial encounter ([facility])  Additional Pertinent Hx: Per EMR: \" Deloris is a 68 year old female whom has been in/out of hospital for quite some time with varying medical conditions.  Most recently pt re-addmitted back to the hosital due to a fall in the home environment in which resulting in pain in LE with imaging indicating an acute fracture in the R femoral neck with diffuse osteopenia.  Pt. underwent open treatment to R femoral neck fracture with prothesis on 24 and is WBAT with hip precautions in place. See physicans H&P for additional information.\"     Prior Level of Function:  Lives With: Spouse  Type of Home: House  Home Layout: One level, Able to Live on Main level with bedroom/bathroom, Performs ADL's on one level  Home Access: Ramped entrance (spouse assists pt up/down ramp with transport chair)  Home Equipment: Walker - Rolling, Walker - 4-Wheeled, Wheelchair - Manual, Lift chair, Reacher (transport chair)   Bathroom Shower/Tub: Tub/Shower unit  Bathroom Toilet: Handicap height  Bathroom Equipment: Grab bars in shower, Shower chair  Bathroom Accessibility: Accessible    Receives Help From: Family  ADL Assistance: Needs assistance  Homemaking Assistance: Needs assistance  Ambulation Assistance: Needs assistance  Transfer Assistance: Needs assistance  Active : No  Additional Comments: Pt's spouse assists with ADLs/IADLs, transfers and mobility tasks.    Restrictions/Precautions:  Restrictions/Precautions: Fall Risk, General Precautions, Weight Bearing, Surgical protocol  Right

## 2024-08-01 NOTE — PROGRESS NOTES
[facility]  INPATIENT SPEECH THERAPY  [facility]  DAILY NOTE    TIME   SLP Individual Minutes  Time In: 0830  Time Out: 0900  Minutes: 30  Timed Code Treatment Minutes: 20 Minutes       Cognitive tx: 20 minutes   Dysphagia tx: 10 minutes     Date: 2024  Patient Name: Deloris Watts      CSN: [id number]   : 1955  (68 y.o.)  Gender: female   Referring Physician:  Jax Whatley MD   Diagnosis: Closed fracture of neck of right femur, initial encounter   Precautions: Fall Risk, Aspiration Risk  Current Diet: Puree diet, thin liquids   Respiratory Status: Room Air  Swallowing Strategies: Full Upright Position, Small Bite/Sip, Medications Whole with Puree, Alternate Solids and Liquids, Limit Distractions, and Monitor for Fatigue   Date of Last MBS/FEES: 2024    Pain:  No pain reported.    Subjective:  Patient sleeping in recliner with , Tenzin at bedside upon ST arrival. Patient requiring max verbal and tactile cues to maintain adequate alertness this date.  reporting patient did not sleep last night.     Short-Term Goals:  SHORT TERM GOAL #1:  Goal 1: Patient will consume conservative puree diet (advanced textures as indicated) and thin liquids with use of compensatory strategies and no overt s/s of aspiration or pulmonary decline to maintain adequate nutrition adn hydration measures  INTERVENTIONS: ST completed skilled dietary analysis of of puree breakfast that consisted of eggs, pancake, magic cup, and thin liquids via straw. Patient with adequate bolus acceptance, decreased/uncoordinated bolus control, manipulation, and formation with presence of mild-moderate diffuse oral residue. Utilization of cued liquid wash effective in clearing oral residue. Presence of intermittent oral holding prior to initiation of stage transition. Consistent swallow initiation achieved with no overt s/s of aspiration.     Education provided to  re:  utilization of ensure for liquid wash to maximize PO consumption of nutritional supplements; verbal receptiveness noted.  was going to resume feeding patient following completion of skilled ST services.     SHORT TERM GOAL #2:  Goal 2: Patient will complete functional recall (O-Log, biographics, call light, etc.) wtih 60% accuracy mod cues to improve safety within environment.  INTERVENTIONS:  Orientation  Place - max cues to utilize visual cues in room    City - min cues following ID of place  Year- max cues with utilization of calendar   Month - mod cues with utilization of calendar DOW - max cues with utilization of calendar   Date - unable to elicit despite max cues   * indicating at baseline patient is oriented to self only    Call Light   -Total assistance to locate safety device and ID of call light button   -Total assistance to ID scenarios to utilize call light     SHORT TERM GOAL #3:  Goal 3: Patient will complete BASIC safety problem solving tasks with 60% accuracy and moderate cuing to improve safety within environment.  INTERVENTIONS: See goal 2 for further details.     SHORT TERM GOAL #4:  Goal 4: Patient will complete ACE with implementation of low stimulation environment guidelines as clinically indicated.  INTERVENTIONS: Did not address this session d/t focus on other goals.     SHORT TERM GOAL #5:  Goal 5: Patient will complete functional expressive (functional phrases, conversational discourse, etc.) and receptive language (direction following) tasks with 70% accuracy and moderate cuing to improve communicative interactions.  INTERVENTIONS: Did not address this date d/t focus on other goals; patient frequently with nonsensical speech within session this date.     Long-Term Goals:  Time Frame for Long Term Goals: 3 weeks    LONG TERM GOAL #1:  Goal 1: Patient will improve cognitive-linguistic functioning to a level of moderate assistance (FIM 4) to allow for safe return to

## 2024-08-01 NOTE — PROGRESS NOTES
cetirizine (ZYRTEC) 5 MG tablet Take 1 tablet by mouth daily    ProviderShey MD   lactulose (CHRONULAC) 10 GM/15ML solution Take 30 mLs by mouth as needed    Shey New MD   triamcinolone (NASACORT) 55 MCG/ACT nasal inhaler 2 sprays by Each Nostril route daily Inhale 2 sprays (110 mcg) by intranasal route    ProviderShey MD   lidocaine 4 % external patch Place 1 patch onto the skin daily  Patient not taking: Reported on 7/31/2024 7/12/24   Zakiya Masterson PA-C   buPROPion (WELLBUTRIN XL) 150 MG extended release tablet Take 1 tablet by mouth every morning 6/19/24   Johana Weldon MD   buPROPion (WELLBUTRIN XL) 300 MG extended release tablet Take 1 tablet by mouth daily 6/19/24   Johana Weldon MD   sertraline (ZOLOFT) 50 MG tablet Take 1 tablet by mouth daily 6/19/24   Johana Weldon MD   primidone (MYSOLINE) 50 MG tablet Take 1 tablet by mouth daily 6/19/24   Johana Weldon MD   clopidogrel (PLAVIX) 75 MG tablet Take 1 tablet by mouth daily 6/19/24   Johana Weldon MD   levETIRAcetam (KEPPRA) 500 MG tablet Take 1 tablet by mouth daily  Patient not taking: Reported on 7/31/2024 6/15/24   Jax Whatley MD   traZODone (DESYREL) 100 MG tablet Take 1 tablet by mouth nightly For sleep 6/14/24   Jax Whatley MD   carbidopa-levodopa (SINEMET)  MG per tablet Take 1 tablet by mouth 3 times daily At 7 am, 12 noon and 5 pm 6/14/24   Jax Whatley MD   amLODIPine (NORVASC) 10 MG tablet Take 1 tablet by mouth daily 6/15/24   Jax Whatley MD   docusate sodium (COLACE, DULCOLAX) 100 MG CAPS Take 100 mg by mouth 2 times daily ; hold if diarrhea 6/14/24   Jax Whatley MD   senna (SENOKOT) 8.6 MG tablet Take 1 tablet by mouth nightly ; hold if diarrhea 6/14/24   Jax Whatley MD   polyethylene glycol (GLYCOLAX) 17 g packet Take 1 packet by mouth daily as needed for Constipation 4/8/24   Johana Weldon MD   megestrol (MEGACE)  40 MG tablet Take 1 tablet by mouth daily To stimulate appetite 3/30/24   Jax Whatley MD   Probiotic Product (PROBIOTIC DAILY PO) Take 1 tablet by mouth daily 1 gummie daily    Shey New MD   sodium chloride (OCEAN, BABY AYR) 0.65 % nasal spray 1 spray by Nasal route as needed for Congestion Saline nasal spray as needed    Shey New MD   montelukast (SINGULAIR) 10 MG tablet Take 1 tablet by mouth daily  Patient not taking: Reported on 6/19/2024 2/20/24   Johana Weldon MD   bisacodyl (DULCOLAX) 10 MG suppository Place 1 suppository rectally daily as needed for Constipation 1/5/24   Jax Whatley MD   fluticasone (FLONASE) 50 MCG/ACT nasal spray 2 sprays by Each Nostril route daily 1/5/24   Jax Whatley MD   simvastatin (ZOCOR) 20 MG tablet TAKE 1 TABLET BY MOUTH DAILY  Patient not taking: Reported on 6/19/2024 6/1/23   Johana Weldon MD   sevelamer (RENVELA) 800 MG tablet Take 1 tablet by mouth 3 times daily With meals 5/1/23   Shey New MD   linaclotide (LINZESS) 145 MCG capsule Take 2 capsules by mouth daily as needed (constipation) PRN    Shey New MD   blood glucose monitor strips Test 1-2x/day. 3/9/23   Johana Weldon MD   ondansetron (ZOFRAN-ODT) 4 MG disintegrating tablet Take 1 tablet by mouth 3 times daily as needed for Nausea or Vomiting 2/28/23   Johana Weldon MD   folic acid (FOLVITE) 400 MCG tablet Take 1 tablet by mouth daily    Shey New MD           Signed:    Brian Trivedi D.O.   Internal Medicine Resident PGY1  Thank you for allowing me to be part of your care.

## 2024-08-02 LAB
ANION GAP SERPL CALC-SCNC: 18 MEQ/L (ref 8–16)
BASOPHILS ABSOLUTE: 0.1 THOU/MM3 (ref 0–0.1)
BASOPHILS NFR BLD AUTO: 0.6 %
BUN SERPL-MCNC: 60 MG/DL (ref 7–22)
CALCIUM SERPL-MCNC: 9.2 MG/DL (ref 8.5–10.5)
CHLORIDE SERPL-SCNC: 95 MEQ/L (ref 98–111)
CO2 SERPL-SCNC: 23 MEQ/L (ref 23–33)
CREAT SERPL-MCNC: 4.7 MG/DL (ref 0.4–1.2)
DEPRECATED RDW RBC AUTO: 58.5 FL (ref 35–45)
EOSINOPHIL NFR BLD AUTO: 5.3 %
EOSINOPHILS ABSOLUTE: 0.6 THOU/MM3 (ref 0–0.4)
ERYTHROCYTE [DISTWIDTH] IN BLOOD BY AUTOMATED COUNT: 16.4 % (ref 11.5–14.5)
GFR SERPL CREATININE-BSD FRML MDRD: 9 ML/MIN/1.73M2
GLUCOSE SERPL-MCNC: 72 MG/DL (ref 70–108)
HCT VFR BLD AUTO: 34.3 % (ref 37–47)
HGB BLD-MCNC: 10.8 GM/DL (ref 12–16)
IMM GRANULOCYTES # BLD AUTO: 1.28 THOU/MM3 (ref 0–0.07)
IMM GRANULOCYTES NFR BLD AUTO: 10.7 %
LYMPHOCYTES ABSOLUTE: 2.5 THOU/MM3 (ref 1–4.8)
LYMPHOCYTES NFR BLD AUTO: 21.2 %
MCH RBC QN AUTO: 30.6 PG (ref 26–33)
MCHC RBC AUTO-ENTMCNC: 31.5 GM/DL (ref 32.2–35.5)
MCV RBC AUTO: 97.2 FL (ref 81–99)
MONOCYTES ABSOLUTE: 1.2 THOU/MM3 (ref 0.4–1.3)
MONOCYTES NFR BLD AUTO: 10 %
NEUTROPHILS ABSOLUTE: 6.2 THOU/MM3 (ref 1.8–7.7)
NEUTROPHILS NFR BLD AUTO: 52.2 %
NRBC BLD AUTO-RTO: 0 /100 WBC
PLATELET # BLD AUTO: 388 THOU/MM3 (ref 130–400)
PLATELET BLD QL SMEAR: ADEQUATE
PMV BLD AUTO: 10.7 FL (ref 9.4–12.4)
POTASSIUM SERPL-SCNC: 4.4 MEQ/L (ref 3.5–5.2)
PROCALCITONIN SERPL IA-MCNC: 1.33 NG/ML (ref 0.01–0.09)
RBC # BLD AUTO: 3.53 MILL/MM3 (ref 4.2–5.4)
SCAN OF BLOOD SMEAR: NORMAL
SODIUM SERPL-SCNC: 136 MEQ/L (ref 135–145)
TOXIC GRANULES BLD QL SMEAR: PRESENT
WBC # BLD AUTO: 11.9 THOU/MM3 (ref 4.8–10.8)

## 2024-08-02 PROCEDURE — 97129 THER IVNTJ 1ST 15 MIN: CPT

## 2024-08-02 PROCEDURE — 90935 HEMODIALYSIS ONE EVALUATION: CPT

## 2024-08-02 PROCEDURE — 99232 SBSQ HOSP IP/OBS MODERATE 35: CPT | Performed by: PHYSICAL MEDICINE & REHABILITATION

## 2024-08-02 PROCEDURE — 36415 COLL VENOUS BLD VENIPUNCTURE: CPT

## 2024-08-02 PROCEDURE — 97535 SELF CARE MNGMENT TRAINING: CPT

## 2024-08-02 PROCEDURE — 97110 THERAPEUTIC EXERCISES: CPT

## 2024-08-02 PROCEDURE — 84145 PROCALCITONIN (PCT): CPT

## 2024-08-02 PROCEDURE — 85025 COMPLETE CBC W/AUTO DIFF WBC: CPT

## 2024-08-02 PROCEDURE — 6360000002 HC RX W HCPCS: Performed by: PHYSICAL MEDICINE & REHABILITATION

## 2024-08-02 PROCEDURE — 6370000000 HC RX 637 (ALT 250 FOR IP): Performed by: PHYSICAL MEDICINE & REHABILITATION

## 2024-08-02 PROCEDURE — 1180000000 HC REHAB R&B

## 2024-08-02 PROCEDURE — 92526 ORAL FUNCTION THERAPY: CPT

## 2024-08-02 PROCEDURE — 80048 BASIC METABOLIC PNL TOTAL CA: CPT

## 2024-08-02 PROCEDURE — 97530 THERAPEUTIC ACTIVITIES: CPT

## 2024-08-02 PROCEDURE — 99232 SBSQ HOSP IP/OBS MODERATE 35: CPT | Performed by: INTERNAL MEDICINE

## 2024-08-02 RX ORDER — OXYCODONE HYDROCHLORIDE 5 MG/1
5 TABLET ORAL EVERY 24 HOURS
Status: DISCONTINUED | OUTPATIENT
Start: 2024-08-03 | End: 2024-08-09 | Stop reason: HOSPADM

## 2024-08-02 RX ADMIN — DOCUSATE SODIUM 100 MG: 100 CAPSULE, LIQUID FILLED ORAL at 22:17

## 2024-08-02 RX ADMIN — CARBIDOPA AND LEVODOPA 1 TABLET: 10; 100 TABLET ORAL at 06:20

## 2024-08-02 RX ADMIN — FLUTICASONE PROPIONATE 2 SPRAY: 50 SPRAY, METERED NASAL at 10:13

## 2024-08-02 RX ADMIN — ACETAMINOPHEN 650 MG: 325 TABLET ORAL at 17:54

## 2024-08-02 RX ADMIN — OXYCODONE 5 MG: 5 TABLET ORAL at 01:20

## 2024-08-02 RX ADMIN — SERTRALINE 50 MG: 50 TABLET, FILM COATED ORAL at 10:12

## 2024-08-02 RX ADMIN — DOCUSATE SODIUM 100 MG: 100 CAPSULE, LIQUID FILLED ORAL at 10:12

## 2024-08-02 RX ADMIN — SENNOSIDES 8.6 MG: 8.6 TABLET, FILM COATED ORAL at 22:17

## 2024-08-02 RX ADMIN — HEPARIN SODIUM 5000 UNITS: 5000 INJECTION INTRAVENOUS; SUBCUTANEOUS at 22:17

## 2024-08-02 RX ADMIN — HEPARIN SODIUM 5000 UNITS: 5000 INJECTION INTRAVENOUS; SUBCUTANEOUS at 10:33

## 2024-08-02 RX ADMIN — CARBIDOPA AND LEVODOPA 1 TABLET: 10; 100 TABLET ORAL at 10:59

## 2024-08-02 RX ADMIN — MEGESTROL ACETATE 40 MG: 40 TABLET ORAL at 22:17

## 2024-08-02 RX ADMIN — AMLODIPINE BESYLATE 10 MG: 10 TABLET ORAL at 10:12

## 2024-08-02 RX ADMIN — ACETAMINOPHEN 650 MG: 325 TABLET ORAL at 06:19

## 2024-08-02 RX ADMIN — FOLIC ACID 500 MCG: 1 TABLET ORAL at 10:12

## 2024-08-02 RX ADMIN — CLOPIDOGREL BISULFATE 75 MG: 75 TABLET ORAL at 10:12

## 2024-08-02 RX ADMIN — TRAZODONE HYDROCHLORIDE 100 MG: 100 TABLET ORAL at 22:17

## 2024-08-02 RX ADMIN — BUPROPION HYDROCHLORIDE 150 MG: 150 TABLET, EXTENDED RELEASE ORAL at 10:12

## 2024-08-02 RX ADMIN — SEVELAMER CARBONATE 800 MG: 800 TABLET, FILM COATED ORAL at 10:12

## 2024-08-02 RX ADMIN — MEGESTROL ACETATE 40 MG: 40 TABLET ORAL at 10:13

## 2024-08-02 RX ADMIN — ACETAMINOPHEN 650 MG: 325 TABLET ORAL at 11:00

## 2024-08-02 RX ADMIN — CARBIDOPA AND LEVODOPA 1 TABLET: 10; 100 TABLET ORAL at 17:05

## 2024-08-02 RX ADMIN — BUPROPION HYDROCHLORIDE 300 MG: 300 TABLET, EXTENDED RELEASE ORAL at 10:16

## 2024-08-02 RX ADMIN — PRIMIDONE 50 MG: 50 TABLET ORAL at 10:12

## 2024-08-02 ASSESSMENT — PAIN DESCRIPTION - LOCATION
LOCATION: HIP;LEG
LOCATION: KNEE;LEG
LOCATION: HIP;LEG
LOCATION: HIP
LOCATION: LEG
LOCATION: LEG

## 2024-08-02 ASSESSMENT — PAIN SCALES - GENERAL
PAINLEVEL_OUTOF10: 4
PAINLEVEL_OUTOF10: 9
PAINLEVEL_OUTOF10: 8
PAINLEVEL_OUTOF10: 0
PAINLEVEL_OUTOF10: 4
PAINLEVEL_OUTOF10: 6
PAINLEVEL_OUTOF10: 4
PAINLEVEL_OUTOF10: 4

## 2024-08-02 ASSESSMENT — PAIN DESCRIPTION - DESCRIPTORS
DESCRIPTORS: ACHING;DISCOMFORT

## 2024-08-02 ASSESSMENT — PAIN SCALES - WONG BAKER
WONGBAKER_NUMERICALRESPONSE: HURTS A LITTLE BIT
WONGBAKER_NUMERICALRESPONSE: HURTS A LITTLE BIT
WONGBAKER_NUMERICALRESPONSE: NO HURT
WONGBAKER_NUMERICALRESPONSE: NO HURT
WONGBAKER_NUMERICALRESPONSE: HURTS A LITTLE BIT
WONGBAKER_NUMERICALRESPONSE: HURTS A LITTLE BIT

## 2024-08-02 ASSESSMENT — PAIN DESCRIPTION - FREQUENCY: FREQUENCY: CONTINUOUS

## 2024-08-02 ASSESSMENT — PAIN DESCRIPTION - ORIENTATION
ORIENTATION: RIGHT

## 2024-08-02 ASSESSMENT — PAIN DESCRIPTION - ONSET: ONSET: ON-GOING

## 2024-08-02 ASSESSMENT — PAIN DESCRIPTION - PAIN TYPE: TYPE: SURGICAL PAIN

## 2024-08-02 ASSESSMENT — PAIN DESCRIPTION - DIRECTION: RADIATING_TOWARDS: DOWN LEG

## 2024-08-02 NOTE — PROGRESS NOTES
Physician Progress Note      PATIENT:               HANS HEWITT  CSN #:                  [id number]  :                       1955  ADMIT DATE:       2024 3:55 PM  DISCH DATE:        2024 5:33 PM  RESPONDING  PROVIDER #:        TIFFANIE GREENBERG          QUERY TEXT:    Patient admitted with femoral neck fracture. Noted to have Severe malnutrition   in PN Nutrition CP . If possible, please document in progress notes and   discharge summary if you are evaluating and /or treating any of the following:    The medical record reflects the following:  Risk Factors: ESRD, HTN, Anemia, Advance age.  Clinical Indicators: Context:  Chronic Illness Dietitian PN    \"Malnutrition Status:  Severe malnutrition  Findings of the 6 clinical characteristics of malnutrition:  Energy Intake:  75% or less estimated energy requirements for 1 month or   longer  Weight Loss: weight up from past admission, hx of weight loss.  Body Fat Loss:  Severe body fat loss Orbital, Triceps, Buccal region  Muscle Mass Loss: Temples.  Fluid Accumulation:  Unable to assess (hemodialysis patient)   Strength:  Not Performed\".  Current BMI (kg/m2): 20.2  Treatment: Consult Dietitian, Continue Current Diet, Start Oral Nutrition   Supplement.    Thanks,  BLACK Madison, CDI.    ASPEN Criteria:    https://aspenjournals.onlinelibrary.robbins.com/doi/full/10.1177/974604773697586  5  Options provided:  -- Protein calorie malnutrition severe  -- Other - I will add my own diagnosis  -- Disagree - Not applicable / Not valid  -- Disagree - Clinically unable to determine / Unknown  -- Refer to Clinical Documentation Reviewer    PROVIDER RESPONSE TEXT:    This patient has severe protein calorie malnutrition.    Query created by: Renea Mallory on 2024 7:59 AM      Electronically signed by:  TIFFANIE GREENBERG 2024 8:10 AM

## 2024-08-02 NOTE — PLAN OF CARE
Problem: Discharge Planning  Goal: Discharge to home or other facility with appropriate resources  Outcome: Progressing  Flowsheets (Taken 8/1/2024 1955)  Discharge to home or other facility with appropriate resources: Identify barriers to discharge with patient and caregiver     Problem: Safety - Adult  Goal: Free from fall injury  Outcome: Progressing     Problem: Pain  Goal: Verbalizes/displays adequate comfort level or baseline comfort level  Outcome: Progressing     Problem: Skin/Tissue Integrity  Goal: Absence of new skin breakdown  Description: 1.  Monitor for areas of redness and/or skin breakdown  2.  Assess vascular access sites hourly  3.  Every 4-6 hours minimum:  Change oxygen saturation probe site  4.  Every 4-6 hours:  If on nasal continuous positive airway pressure, respiratory therapy assess nares and determine need for appliance change or resting period.  Outcome: Progressing     Problem: Chronic Conditions and Co-morbidities  Goal: Patient's chronic conditions and co-morbidity symptoms are monitored and maintained or improved  Outcome: Progressing  Flowsheets (Taken 8/1/2024 1955)  Care Plan - Patient's Chronic Conditions and Co-Morbidity Symptoms are Monitored and Maintained or Improved: Monitor and assess patient's chronic conditions and comorbid symptoms for stability, deterioration, or improvement     Problem: Nutrition Deficit:  Goal: Optimize nutritional status  8/2/2024 0246 by Brandy Quevedo, RN  Outcome: Progressing   Care plan reviewed with patient.  Patient verbalizes understanding of the plan of care and contributes to goal setting.

## 2024-08-02 NOTE — PROGRESS NOTES
[facility]  INPATIENT PHYSICAL THERAPY  DAILY NOTE  [facility] - [id number]    Time In: 0830  Time Out: 1000  Timed Code Treatment Minutes: 90 Minutes  Minutes: 90          Date: 2024  Patient Name: Deloris Watts,  Gender:  female        MRN: [id number]  : 1955  (68 y.o.)     Referring Practitioner: Jax Whatley MD  Diagnosis: Closed fracture of neck of right demu, initial encounter ([facility])  Additional Pertinent Hx: Per EMR: \" Deloris is a 68 year old female whom has been in/out of hospital for quite some time with varying medical conditions.  Most recently pt re-addmitted back to the hosital due to a fall in the home environment in which resulting in pain in LE with imaging indicating an acute fracture in the R femoral neck with diffuse osteopenia.  Pt. underwent open treatment to R femoral neck fracture with prothesis on 24 and is WBAT with hip precautions in place. See physicans H&P for additional information.\"     Prior Level of Function:  Lives With: Spouse  Type of Home: House  Home Layout: One level, Able to Live on Main level with bedroom/bathroom, Performs ADL's on one level  Home Access: Ramped entrance (spouse assists pt up/down ramp with transport chair)  Home Equipment: Walker - Rolling, Walker - 4-Wheeled, Wheelchair - Manual, Lift chair, Reacher (transport chair)   Bathroom Shower/Tub: Tub/Shower unit  Bathroom Toilet: Handicap height  Bathroom Equipment: Grab bars in shower, Shower chair  Bathroom Accessibility: Accessible    Receives Help From: Family  ADL Assistance: Needs assistance  Homemaking Assistance: Needs assistance  Ambulation Assistance: Needs assistance  Transfer Assistance: Needs assistance  Active : No  Additional Comments: Pt's spouse assists with ADLs/IADLs, transfers and mobility tasks.    Restrictions/Precautions:  Restrictions/Precautions: Fall Risk, General Precautions, Weight Bearing, Surgical protocol  Right

## 2024-08-02 NOTE — PLAN OF CARE
Problem: Discharge Planning  Goal: Discharge to home or other facility with appropriate resources  8/2/2024 1552 by Kusum Guajardo LISW  Note: Family meeting took place on this date with Layla Dave Ted and Jessica on this date. FLY Campbell assisted family in code status and educated them on palliative care services and overall plan of care. Family reported wanting patient to be transitioned to SNF after discharge from [facility]. Tenzin reported that his choices are the [place] The plan is for SW to follow up with Tenzin on Monday, 8/5 to discuss patient's progress and when to make this next step. Decision overall tolerance of 3 hours of therapy. SW will follow and maintain involvement in discharge planning.

## 2024-08-02 NOTE — CARE COORDINATION
I've had a difficult time understanding what she is saying an when I can understand her words they usually not about the present topic. She did take her pills this evening well for me. Has asked to go to the bathroom several times for bowel movement with no results.Has had nothing good to say about any of the transferring with the use of the lionel stedy and has not wanted to use a bed pan.

## 2024-08-02 NOTE — PROGRESS NOTES
attempted to visit Deloris a 68 year old female admitted in 8K 3 to provide spiritual and emotional support. Patient is not available. Patient has been anointed for spiritual rite. Spiritual Care team will follow up with patient during regular rounding to provide spiritual and emotional support for her needs. Spiritual Health service remain available.

## 2024-08-02 NOTE — PROGRESS NOTES
RECREATIONAL THERAPY  [facility]      Date:  8/2/2024            Patient Name: Deloris Watts           MRN: [id number]  Acct: [id number]          YOB: 1955 (68 y.o.)       Gender: female   Diagnosis: closed fracture of neck of right femur, initial encounter  Physician: Referring Practitioner: Jax Whatley MD    REASON FOR MISSED TREATMENT:  Upon arrival  was feeding pt her lunch (pureed) and she will be going to dialysis soon-they were watching the [employer] on tv- shook his head no when asked if I could bring in any leisure materials for in room use-will continue to monitor for RT appropriateness     Elena Tan, CTRS    8/2/2024

## 2024-08-02 NOTE — PLAN OF CARE
Problem: Discharge Planning  Goal: Discharge to home or other facility with appropriate resources  8/2/2024 1222 by Carri Dorado LPN  Outcome: Progressing  Flowsheets (Taken 8/2/2024 1222)  Discharge to home or other facility with appropriate resources:   Identify barriers to discharge with patient and caregiver   Identify discharge learning needs (meds, wound care, etc)   Refer to discharge planning if patient needs post-hospital services based on physician order or complex needs related to functional status, cognitive ability or social support system   Arrange for needed discharge resources and transportation as appropriate  Note: Unknown discharge date will discuss in team conference   8/2/2024 0246 by Brandy Quevedo RN  Outcome: Progressing  Flowsheets (Taken 8/1/2024 1955)  Discharge to home or other facility with appropriate resources: Identify barriers to discharge with patient and caregiver     Problem: Safety - Adult  Goal: Free from fall injury  8/2/2024 1222 by Carri Dorado LPN  Outcome: Progressing  Flowsheets (Taken 8/2/2024 1222)  Free From Fall Injury: Instruct family/caregiver on patient safety  8/2/2024 0246 by Brandy Quevedo RN  Outcome: Progressing     Problem: Pain  Goal: Verbalizes/displays adequate comfort level or baseline comfort level  8/2/2024 1222 by Carri Dorado LPN  Outcome: Progressing  Flowsheets (Taken 8/2/2024 1222)  Verbalizes/displays adequate comfort level or baseline comfort level:   Encourage patient to monitor pain and request assistance   Assess pain using appropriate pain scale  8/2/2024 0246 by Brandy Quevedo RN  Outcome: Progressing     Problem: Skin/Tissue Integrity  Goal: Absence of new skin breakdown  Description: 1.  Monitor for areas of redness and/or skin breakdown  2.  Assess vascular access sites hourly  3.  Every 4-6 hours minimum:  Change oxygen saturation probe site  4.  Every 4-6 hours:  If on nasal continuous positive airway  pressure, respiratory therapy assess nares and determine need for appliance change or resting period.  8/2/2024 1222 by Carri Dorado LPN  Outcome: Progressing  8/2/2024 0246 by Brandy Quevedo RN  Outcome: Progressing     Problem: ABCDS Injury Assessment  Goal: Absence of physical injury  8/2/2024 1222 by Carri Dorado LPN  Outcome: Progressing  Flowsheets (Taken 8/2/2024 1222)  Absence of Physical Injury: Implement safety measures based on patient assessment  8/2/2024 0246 by Brandy Quevedo RN  Outcome: Progressing     Problem: Chronic Conditions and Co-morbidities  Goal: Patient's chronic conditions and co-morbidity symptoms are monitored and maintained or improved  8/2/2024 1222 by Carri Dorado LPN  Outcome: Progressing  Flowsheets (Taken 8/2/2024 1222)  Care Plan - Patient's Chronic Conditions and Co-Morbidity Symptoms are Monitored and Maintained or Improved: Monitor and assess patient's chronic conditions and comorbid symptoms for stability, deterioration, or improvement  8/2/2024 0246 by Brandy Quevedo RN  Outcome: Progressing  Flowsheets (Taken 8/1/2024 1955)  Care Plan - Patient's Chronic Conditions and Co-Morbidity Symptoms are Monitored and Maintained or Improved: Monitor and assess patient's chronic conditions and comorbid symptoms for stability, deterioration, or improvement     Problem: Nutrition Deficit:  Goal: Optimize nutritional status  8/2/2024 1222 by Carri Dorado LPN  Outcome: Progressing  Flowsheets (Taken 8/2/2024 1222)  Nutrient intake appropriate for improving, restoring, or maintaining nutritional needs:   Assess nutritional status and recommend course of action   Monitor oral intake, labs, and treatment plans  8/2/2024 0246 by Brandy Quevedo RN  Outcome: Progressing

## 2024-08-02 NOTE — PROGRESS NOTES
Comprehensive Nutrition Assessment    Type and Reason for Visit:  Reassess    Nutrition Recommendations/Plan:   Recommend diet as per SLP.  Continue to send Magic Cups TID (x 2 per tray).  Will send yogurt TID as pt. Agreeable.  Continue calorie count (through 8/5) -pt. Consumed 1160 kcals, 33 gm protein past 24 hours w/ majority of intake from ONS.  Recommend continue Megace for appetite stimulation - ? If pt. Would benefit from increase in dose.  Recommend MVI.  Rx per provider to assist with constipation.  No BM noted since 7/25.     Malnutrition Assessment:  Malnutrition Status:  Severe malnutrition (07/31/24 1152)    Context:  Chronic Illness     Findings of the 6 clinical characteristics of malnutrition:  Energy Intake:  75% or less estimated energy requirements for 1 month or longer  Weight Loss:  No significant weight loss     Body Fat Loss:  Severe body fat loss Orbital, Triceps, Buccal region   Muscle Mass Loss:  Severe muscle mass loss Temples (temporalis)  Fluid Accumulation:  No significant fluid accumulation     Strength:  Not Performed    Nutrition Assessment:      Pt. severely malnourished AEB criteria listed above. At risk for further nutritional compromise r/t admitted to [facility] s/p fall resulting in right hip fracture- went to the OR on 7/26. MBS on 7/30 recommended pureed with thin liquids; increased nutrient needs for wound healing; known losses with dialysis. Noted underlying medical condition (PMHx anemia in CKD- on HD, started in 1/2023, CHF, CVA in 1990s, depression, fibromyalgia, HLD, HTN, IBS, osteoporosis).     Nutrition Related Findings:    Pt. Report/Treatments/Miscellaneous:   8/2: pt. Seen in HD; awake and alert but difficult to understand - mumbles at times; states mainly consuming ONS; not eating much else; agrees to trial yogurt; staff noting that pt's mental status is better today; SLP following; plan family meeting w/ palliative care, etc this afternoon  8/1: pt. Seen this am;  Measures:  Height: 152.4 cm (5')  Ideal Body Weight (IBW): 100 lbs (45 kg)    Admission Body Weight: 49 kg (108 lb) (7/30, no edema)  Current Body Weight: 46.8 kg (103 lb 2.8 oz) (7/31 no edema),  Weight Source: Bed Scale  Current BMI (kg/m2): 20.2  Usual Body Weight:  (6/14/24: 91 lb; 3/30/24: 86 lb; 3/15/24: 93 lb; 1/23/24: 104 lb; 12/27/23: 109 lb; 9/20/23: 94 lb; 8/28/23: 98 lb)     Weight Adjustment For: No Adjustment                 BMI Categories: Underweight (BMI less than 22) age over 65    Estimated Daily Nutrient Needs:  Energy Requirements Based On: Kcal/kg  Weight Used for Energy Requirements: Admission  Energy (kcal/day): 9073-1019 kcal (30-35 kcal/kg)  Weight Used for Protein Requirements: Admission  Protein (g/day): 59-98 g (1.2-2 g/kg)    Nutrition Diagnosis:   Severe malnutrition, In context of chronic illness related to inadequate protein-energy intake as evidenced by Criteria as identified in malnutrition assessment    Nutrition Interventions:   Food and/or Nutrient Delivery: Continue Current Diet, Continue Oral Nutrition Supplement, Continue Calorie Count  Nutrition Education/Counseling: Education initiated  Coordination of Nutrition Care: Continue to monitor while inpatient       Goals:  Previous Goal Met: Progressing toward Goal(s)  Goals: PO intake 75% or greater, by next RD assessment       Nutrition Monitoring and Evaluation:   Behavioral-Environmental Outcomes: None Identified  Food/Nutrient Intake Outcomes: Diet Advancement/Tolerance, Food and Nutrient Intake, Supplement Intake  Physical Signs/Symptoms Outcomes: Biochemical Data, Chewing or Swallowing, GI Status, Fluid Status or Edema, Nutrition Focused Physical Findings, Skin, Weight    Discharge Planning:    Too soon to determine     Lurdes Lee, RD, LD  Contact: [phone]

## 2024-08-02 NOTE — PROGRESS NOTES
[facility]   Nephrology progress note  8/2/2024, 12:52 PM      Pt Name:    Deloris Watts  MRN:     [id number]     YOB: 1955  Admit Date:    7/30/2024  6:02 PM    Chief Complaint: Nephrology following for ESRD    Subjective:  Patient seen and examined  Feels okay no complaints   at bedside  Mental status doing much better today    Objective:  24HR INTAKE/OUTPUT:    Intake/Output Summary (Last 24 hours) at 8/2/2024 1252  Last data filed at 8/1/2024 2215  Gross per 24 hour   Intake 240 ml   Output --   Net 240 ml      Admission weight: 49.1 kg (108 lb 3.9 oz)  Wt Readings from Last 3 Encounters:   07/31/24 46.8 kg (103 lb 2.8 oz)   07/29/24 48.7 kg (107 lb 5.8 oz)   07/10/24 46.6 kg (102 lb 11.8 oz)        Vitals :   Vitals:    08/01/24 1129 08/01/24 1955 08/02/24 0150 08/02/24 1000   BP: (!) 140/79 125/73  123/83   Pulse: 84 84  80   Resp: 18 18 18 18   Temp: 98.3 °F (36.8 °C) 98.1 °F (36.7 °C)  98.1 °F (36.7 °C)   TempSrc: Oral Oral  Oral   SpO2: 95% 97%     Weight:       Height:           Physical examination  General Appearance:  appears comfortable, no distress  Neck: No JVD noted  Lungs clear  Heart: S1-S2  Psych: Not agitated  CNS grossly intact    Medications:  Infusion:    sodium chloride       Meds:    carbidopa-levodopa  1 tablet Oral TID    traZODone  100 mg Oral Nightly    megestrol  40 mg Oral BID    buPROPion  150 mg Oral QAM    buPROPion  300 mg Oral Daily    docusate sodium  100 mg Oral BID    fluticasone  2 spray Each Nostril Daily    folic acid  500 mcg Oral Daily    primidone  50 mg Oral Daily    senna  1 tablet Oral Nightly    sevelamer  800 mg Oral BID with meals    sertraline  50 mg Oral Daily    amLODIPine  10 mg Oral Daily    sodium chloride flush  5-40 mL IntraVENous 2 times per day    clopidogrel  75 mg Oral Daily    acetaminophen  650 mg Oral Q6H    heparin (porcine)  5,000 Units SubCUTAneous BID     Lab Data :  CBC:   Recent Labs      07/31/24  0648 08/01/24  0821 08/02/24  0617   WBC 9.2 10.5 11.9*   HGB 11.1* 11.5* 10.8*   HCT 34.8* 36.1* 34.3*    374 388       CMP:  Recent Labs     07/31/24  0648 08/01/24  0822 08/02/24  0617    137 136   K 4.3  4.3 4.0 4.4   CL 94* 95* 95*   CO2 26 25 23   BUN 63* 38* 60*   CREATININE 5.3* 3.5* 4.7*   GLUCOSE 78 88 72   CALCIUM 9.0 9.3 9.2   MG 2.7*  --   --      Assessment and Plan:  ESRD on hemodialysis Monday Wednesday Friday  Volume status and electrolytes stable  Will get the patient dialyzed today  Metabolic acidosis.  Improved with the dialysis  Mild hyponatremia follow for now  Anemia in ESRD.  Hemoglobin stable.  Will monitor  Leukocytosis  Status post fall  Femoral neck fracture status post surgery  Discussed with  and the patient  Currently undergoing rehab    To Cuadra MD  [facility]    This report has been created using voice recognition software. It may contain minor errors which are inherent in voice recognition technology

## 2024-08-02 NOTE — PROGRESS NOTES
[facility]  INPATIENT SPEECH THERAPY  [facility]  DAILY NOTE    TIME   SLP Individual Minutes  Time In: 1000  Time Out: 1030  Minutes: 30  Timed Code Treatment Minutes: 20 Minutes       Cognitive tx: 20 minutes   Dysphagia tx: 10 minutes     Date: 2024  Patient Name: Deloris Watts      CSN: [id number]   : 1955  (68 y.o.)  Gender: female   Referring Physician:  Jax Whatley MD   Diagnosis: Closed fracture of neck of right femur, initial encounter   Precautions: Fall Risk, Aspiration Risk  Current Diet: Puree diet, thin liquids   Respiratory Status: Room Air  Swallowing Strategies: Full Upright Position, Small Bite/Sip, Medications Whole with Puree, Alternate Solids and Liquids, Limit Distractions, and Monitor for Fatigue   Date of Last MBS/FEES: 2024    Pain:  No pain reported.    Subjective:  Patient was awake and upright in wheelchair upon ST entry.  Patient's  present at bedside.  Telesitter present at bedside.  RN present throughout session this date.     Short-Term Goals:  SHORT TERM GOAL #1:  Goal 1: Patient will consume conservative puree diet (advanced textures as indicated) and thin liquids with use of compensatory strategies and no overt s/s of aspiration or pulmonary decline to maintain adequate nutrition adn hydration measures  INTERVENTIONS:   Patient was observed taking medications whole in applesauce provided by RN present this date.  Patient was observed to have atypical head posture leaning backwards when presented with a spoon and she held this posture during swallows.  Cues to hold head in a neutral head position were unsuccessful.  Patient was observed to hold bolus of puree/whole medications and require frequent verbal cues to swallow bolus.  Patient unable to take large pills as they remained in her oral cavity after the swallow.  Patient's tongue was noted to deviate within her oral cavity and she had difficulty  manipulating the bolus.  Overt s/s of aspiration were observed when the patient attempted to take a sip of thin liquid to clear pill from oral cavity. Patient's level of confusion appeared to be a factor as during one trial she began talking prior to swallowing the liquid bolus in her mouth and then began coughing.     ST recommending continue puree diet and thi liquids with DIRECT 1:1 supervision/assistance.  Crush medications in puree.  ST to continue to follow to provide skilled dysphagia management.     SHORT TERM GOAL #2:  Goal 2: Patient will complete functional recall (O-Log, biographics, call light, etc.) wtih 60% accuracy mod cues to improve safety within environment.  INTERVENTIONS:  Orientation  Place - independent   City - min cues following ID of place  Year- max cues with utilization of calendar   Month - max cues with utilization of calendar   EDA - max cues with utilization of calendar   Date - unable to elicit despite max cues   * has previously indicated that at baseline patient is oriented to self only      SHORT TERM GOAL #3:  Goal 3: Patient will complete BASIC safety problem solving tasks with 60% accuracy and moderate cuing to improve safety within environment.  INTERVENTIONS:   BASIC Verbal Problem Solving - Home Environment  Maximal verbal cues to correctly respond to questions. Some required modeling due to incorrect responses with max cues. Patient's  reports they are planning to discharge her to SNF but do not want to tell her so it doesn't affect her participation in therapy. He reports that he does not leave her alone and she has 24/hr supervision.     SHORT TERM GOAL #4:  Goal 4: Patient will complete ACE with implementation of low stimulation environment guidelines as clinically indicated.  INTERVENTIONS: Did not address this session d/t focus on other goals.     SHORT TERM GOAL #5:  Goal 5: Patient will complete functional expressive (functional phrases, conversational

## 2024-08-02 NOTE — FLOWSHEET NOTE
08/02/24 1211 08/02/24 1513   Vital Signs   BP (!) 144/78 134/84   Temp 97.9 °F (36.6 °C) 97.9 °F (36.6 °C)   Pulse 90 98   Respirations 16 16   SpO2 96 % 97 %   Weight - Scale 51.5 kg (113 lb 8.6 oz) 50.5 kg (111 lb 5.3 oz)   Weight Method Bed scale Bed scale   Percent Weight Change 10.04 -1.94   Post-Hemodialysis Assessment   Post-Treatment Procedures  --  Blood returned;Catheter Capped, clamped with Saline x2 ports   Machine Disinfection Process  --  Acid/Vinegar Clean;Heat Disinfect;Exterior Machine Disinfection   Blood Volume Processed (Liters)  --  57.3 L   Dialyzer Clearance  --  Lightly streaked   Duration of Treatment (minutes)  --  150 minutes   Heparin Amount Administered During Treatment (mL)  --  0 mL   Hemodialysis Intake (ml)  --  400 ml   Hemodialysis Output (ml)  --  1400 ml   NET Removed (ml)  --  1000   Tolerated Treatment  --  Good     Stable 2.5 hour treatment completed. Removed 1 liter of fluid as tolerated. HD cath flushed, clamped with tegos in place. Report called to primary RN. Treatment record printed to be scanned into the EMR.   LOV 12/13/2022  NOV 01/17/2023  Received faxed to send 90 day supply to pharmacy.

## 2024-08-02 NOTE — PROGRESS NOTES
[facility]  Occupational Therapy  Daily Note  Time:   Time In: 0700  Time Out: 0800  Timed Code Treatment Minutes: 60 Minutes  Minutes: 60          Date: 2024  Patient Name: Deloris Watts,   Gender: female      Room: [facility]03/003-A  MRN: [id number]  : 1955  (68 y.o.)  Referring Practitioner: Jax Whatley MD  Diagnosis: closed fracture of neck of right femur, initial encounter  Additional Pertinent Hx: Deloris is a 68 year old female whom has been in/out of hospital for quite some time with varying medical conditions.  [facility] in the home environment in which resulting in pain in LE with imaging indicating an acute fracture in the R femoral neck with diffuse osteopenia.  Pt. underwent open treatment to R femoral neck fracture with prothesis on 24 and is WBAT. Pt. admitted back to the [facility] on 24 for further medical care and referred to skilled OT services at this time.  See physicans H&P for additional information.    Restrictions/Precautions:  Restrictions/Precautions: Fall Risk, General Precautions, Weight Bearing, Surgical protocol  Right Lower Extremity Weight Bearing: Weight Bearing As Tolerated  Position Activity Restriction  Hip Precautions: No hip flexion > 90 degrees, No hip internal rotation, No hip external rotation, No ADduction     Social/Functional History:  Lives With: Spouse  Type of Home: House  Home Layout: One level, Able to Live on Main level with bedroom/bathroom, Performs ADL's on one level  Home Access: Ramped entrance (spouse assists pt up/down ramp with transport chair)  Home Equipment: Walker - Rolling, Walker - 4-Wheeled, Wheelchair - Manual, Lift chair, Reacher (transport chair)   Bathroom Shower/Tub: Tub/Shower unit  Bathroom Toilet: Handicap height  Bathroom Equipment: Grab bars in shower, Shower chair  Bathroom Accessibility: Accessible    Receives Help  Fall Prevention, and Assistive Device Safety    Goals  Short Term Goals  Time Frame for Short Term Goals: 1 week  Short Term Goal 1: Pt. to tolerate standing up to 2 minutes with 1 hand release CGA to improve performance with clothing management.  Short Term Goal 2: Pt. to transfer to/from toilet/BSC with Min A x1 to promote improved performance with ADLs.  Short Term Goal 3: Pt. to follow 2 step grooming task with SBA with no more than 3 cues for cognition to advance performance with self cares.  Short Term Goal 4: Pt. to attend to self care routine up to 5 minutes before a cue required to redirect back to task to improve performance with ADLs.  Short Term Goal 5: Pt. to complete self feed with SBA with min cues for technique to improve overall intake.  Long Term Goals  Time Frame for Long Term Goals : 2 weeks from OT eval on IPR  Long Term Goal 1: Pt. to demo improved performance with daily occupations as evidenced by a score of 25/100 on the Modified Barthel Index.    Following session, patient left in safe position with all fall risk precautions in place.

## 2024-08-03 PROCEDURE — 97130 THER IVNTJ EA ADDL 15 MIN: CPT

## 2024-08-03 PROCEDURE — 1180000000 HC REHAB R&B

## 2024-08-03 PROCEDURE — 97110 THERAPEUTIC EXERCISES: CPT

## 2024-08-03 PROCEDURE — 6370000000 HC RX 637 (ALT 250 FOR IP): Performed by: PHYSICAL MEDICINE & REHABILITATION

## 2024-08-03 PROCEDURE — 97129 THER IVNTJ 1ST 15 MIN: CPT

## 2024-08-03 PROCEDURE — 97535 SELF CARE MNGMENT TRAINING: CPT

## 2024-08-03 PROCEDURE — 97530 THERAPEUTIC ACTIVITIES: CPT

## 2024-08-03 PROCEDURE — 6360000002 HC RX W HCPCS: Performed by: PHYSICAL MEDICINE & REHABILITATION

## 2024-08-03 PROCEDURE — 99232 SBSQ HOSP IP/OBS MODERATE 35: CPT | Performed by: INTERNAL MEDICINE

## 2024-08-03 RX ADMIN — ACETAMINOPHEN 650 MG: 325 TABLET ORAL at 18:17

## 2024-08-03 RX ADMIN — HEPARIN SODIUM 5000 UNITS: 5000 INJECTION INTRAVENOUS; SUBCUTANEOUS at 21:35

## 2024-08-03 RX ADMIN — SENNOSIDES 8.6 MG: 8.6 TABLET, FILM COATED ORAL at 21:35

## 2024-08-03 RX ADMIN — FLUTICASONE PROPIONATE 2 SPRAY: 50 SPRAY, METERED NASAL at 09:14

## 2024-08-03 RX ADMIN — OXYCODONE 5 MG: 5 TABLET ORAL at 06:24

## 2024-08-03 RX ADMIN — CARBIDOPA AND LEVODOPA 1 TABLET: 10; 100 TABLET ORAL at 05:16

## 2024-08-03 RX ADMIN — CARBIDOPA AND LEVODOPA 1 TABLET: 10; 100 TABLET ORAL at 16:05

## 2024-08-03 RX ADMIN — MEGESTROL ACETATE 40 MG: 40 TABLET ORAL at 21:35

## 2024-08-03 RX ADMIN — PRIMIDONE 50 MG: 50 TABLET ORAL at 09:14

## 2024-08-03 RX ADMIN — TRAZODONE HYDROCHLORIDE 100 MG: 100 TABLET ORAL at 21:35

## 2024-08-03 RX ADMIN — CLOPIDOGREL BISULFATE 75 MG: 75 TABLET ORAL at 09:14

## 2024-08-03 RX ADMIN — DOCUSATE SODIUM 100 MG: 50 LIQUID ORAL at 21:35

## 2024-08-03 RX ADMIN — BUPROPION HYDROCHLORIDE 300 MG: 300 TABLET, EXTENDED RELEASE ORAL at 09:18

## 2024-08-03 RX ADMIN — SERTRALINE 50 MG: 50 TABLET, FILM COATED ORAL at 09:14

## 2024-08-03 RX ADMIN — OXYCODONE 5 MG: 5 TABLET ORAL at 13:04

## 2024-08-03 RX ADMIN — DOCUSATE SODIUM 100 MG: 50 LIQUID ORAL at 10:28

## 2024-08-03 RX ADMIN — ACETAMINOPHEN 650 MG: 325 TABLET ORAL at 11:26

## 2024-08-03 RX ADMIN — CARBIDOPA AND LEVODOPA 1 TABLET: 10; 100 TABLET ORAL at 11:26

## 2024-08-03 RX ADMIN — FOLIC ACID 500 MCG: 1 TABLET ORAL at 09:14

## 2024-08-03 RX ADMIN — HEPARIN SODIUM 5000 UNITS: 5000 INJECTION INTRAVENOUS; SUBCUTANEOUS at 09:14

## 2024-08-03 RX ADMIN — ACETAMINOPHEN 650 MG: 325 TABLET ORAL at 05:16

## 2024-08-03 RX ADMIN — AMLODIPINE BESYLATE 10 MG: 10 TABLET ORAL at 09:14

## 2024-08-03 RX ADMIN — OXYCODONE 5 MG: 5 TABLET ORAL at 01:31

## 2024-08-03 RX ADMIN — MEGESTROL ACETATE 40 MG: 40 TABLET ORAL at 09:14

## 2024-08-03 ASSESSMENT — PAIN DESCRIPTION - ORIENTATION
ORIENTATION: RIGHT

## 2024-08-03 ASSESSMENT — PAIN SCALES - GENERAL
PAINLEVEL_OUTOF10: 9
PAINLEVEL_OUTOF10: 5
PAINLEVEL_OUTOF10: 10
PAINLEVEL_OUTOF10: 0
PAINLEVEL_OUTOF10: 9
PAINLEVEL_OUTOF10: 0
PAINLEVEL_OUTOF10: 0
PAINLEVEL_OUTOF10: 5

## 2024-08-03 ASSESSMENT — PAIN DESCRIPTION - DESCRIPTORS
DESCRIPTORS: ACHING;THROBBING;DISCOMFORT
DESCRIPTORS: ACHING;DISCOMFORT
DESCRIPTORS: ACHING;DISCOMFORT
DESCRIPTORS: SHARP

## 2024-08-03 ASSESSMENT — PAIN - FUNCTIONAL ASSESSMENT: PAIN_FUNCTIONAL_ASSESSMENT: PREVENTS OR INTERFERES WITH ALL ACTIVE AND SOME PASSIVE ACTIVITIES

## 2024-08-03 ASSESSMENT — PAIN DESCRIPTION - LOCATION
LOCATION: HIP
LOCATION: HIP;LEG
LOCATION: HIP
LOCATION: HIP

## 2024-08-03 ASSESSMENT — PAIN SCALES - WONG BAKER: WONGBAKER_NUMERICALRESPONSE: HURTS LITTLE MORE

## 2024-08-03 NOTE — PLAN OF CARE
Problem: Discharge Planning  Goal: Discharge to home or other facility with appropriate resources  Outcome: Progressing  Flowsheets  Taken 8/3/2024 1104 by Carri Dorado LPN  Discharge to home or other facility with appropriate resources:   Identify barriers to discharge with patient and caregiver   Identify discharge learning needs (meds, wound care, etc)   Refer to discharge planning if patient needs post-hospital services based on physician order or complex needs related to functional status, cognitive ability or social support system   Arrange for needed discharge resources and transportation as appropriate  Taken 8/2/2024 2201 by Beverley Hendrickson RN  Discharge to home or other facility with appropriate resources: Identify barriers to discharge with patient and caregiver  Note: Unknown discharge will discuss in team conference      Problem: Safety - Adult  Goal: Free from fall injury  Outcome: Progressing  Flowsheets (Taken 8/3/2024 1104)  Free From Fall Injury: Instruct family/caregiver on patient safety     Problem: Pain  Goal: Verbalizes/displays adequate comfort level or baseline comfort level  Outcome: Progressing  Flowsheets (Taken 8/3/2024 1104)  Verbalizes/displays adequate comfort level or baseline comfort level:   Encourage patient to monitor pain and request assistance   Assess pain using appropriate pain scale     Problem: Skin/Tissue Integrity  Goal: Absence of new skin breakdown  Description: 1.  Monitor for areas of redness and/or skin breakdown  2.  Assess vascular access sites hourly  3.  Every 4-6 hours minimum:  Change oxygen saturation probe site  4.  Every 4-6 hours:  If on nasal continuous positive airway pressure, respiratory therapy assess nares and determine need for appliance change or resting period.  Outcome: Progressing     Problem: ABCDS Injury Assessment  Goal: Absence of physical injury  Outcome: Progressing  Flowsheets (Taken 8/3/2024 1104)  Absence of Physical Injury:

## 2024-08-03 NOTE — PROGRESS NOTES
Patient educated on how to use incentive spirometer. Patient did not verbalized understanding and or demonstrated proper use. Emphasized importance and usage of device, with coughing and deep breathing every 2 hours while awake.

## 2024-08-03 NOTE — PROGRESS NOTES
[facility]  INPATIENT SPEECH THERAPY  [facility]  DAILY NOTE    TIME   SLP Individual Minutes  Time In: 1200  Time Out: 1230  Minutes: 30  Timed Code Treatment Minutes: 30 Minutes  Cognitive tx: 30 minutes   Dysphagia tx: 0 minutes     Date: 8/3/2024  Patient Name: Deloris Watts      CSN: [id number]   : 1955  (68 y.o.)  Gender: female   Referring Physician:  Jax Whatley MD   Diagnosis: Closed fracture of neck of right femur, initial encounter   Precautions: Fall Risk, Aspiration Risk  Current Diet: Puree diet, thin liquids   Respiratory Status: Room Air  Swallowing Strategies: Full Upright Position, Small Bite/Sip, Medications Whole with Puree, Alternate Solids and Liquids, Limit Distractions, and Monitor for Fatigue   Date of Last MBS/FEES: 2024    Pain:  No pain reported.    Subjective:  Patient with waxing/waning alertness, resting in recliner upon ST arrival; agreeable with at least moderate-maximum encouragement to participate within ST interventions. Patient with report of 10/10 pain with RN, Carri, informed by ST with agreement to provide pain medication following ST session. Virtual  present at bedside.      Short-Term Goals:  SHORT TERM GOAL #1:  Goal 1: Patient will consume conservative puree diet (advanced textures as indicated) and thin liquids with use of compensatory strategies and no overt s/s of aspiration or pulmonary decline to maintain adequate nutrition adn hydration measures  INTERVENTIONS:  DNT due to focus on additional STGs.    SHORT TERM GOAL #2:  Goal 2: Patient will complete functional recall (O-Log, biographics, call light, etc.) wtih 60% accuracy mod cues to improve safety within environment.  INTERVENTIONS:  O-Lo/30  Independent: City, Name of Hospital, Month, Clock Time, Etiology, Deficits/Goals   [facility] Cuing: Kind of Place, Month, Year  Unable: Date, EDA    SHORT TERM GOAL #3:  Goal 3: Patient will complete  BASIC safety problem solving tasks with 60% accuracy and moderate cuing to improve safety within environment.  INTERVENTIONS:   Safety Problem Solving (Pictures)   ID Problem: 4/8 independent, 1/8 with minimal cuing, 1/8 with moderate cuing, 2/8 with maximum cuing   Solution: 2/8 independent, 3/8 with minimal cuing, 2/8 with moderate cuing, 1/8 with maximum cuing    Goal Generation (Go Home, Improve [place], Sleep, Talk to Friends, Read): moderate-maximum cuing to generate  *Patient with need for encouragement to generate goals with consistent report, \"I just want to sleep, go away.\"    SHORT TERM GOAL #4:  Goal 4: Patient will complete ACE with implementation of low stimulation environment guidelines as clinically indicated.  INTERVENTIONS:   Acute Concussion Evaluation (ACE)   Physical Symptoms: 6/10  Cognitive Symptoms: 4/4  Emotional Symptoms: 2/4  Sleep Symptoms: 2/4    Total: 14/22    Low Stimulation Environment Guidelines:  Limit the number of visitors in the room (no more than two at a time). Speak one at a time. Keep visits short.    Maintain quiet, dark room and keep the door closed.  Remove personal electronic devices and cell phone(s).   No television, movies, electronic games.  Consider the amount of visual stimulation in the room (number of pictures, banners, balloons etc).    Encourage and allow frequent rest periods.     Symptoms:  Headache, Nausea, Vomiting, Balance Problems, Dizziness, Visual Problems, Fatigue, Sensitivity to Light, Sensitivity to Noise, Numbness/Tingling, Feeling Mentally Foggy, Feeling Slowed Down, Difficulty Concnetrating, Difficulty Remebering, Irritibility, Sadness, More Emotional, Nervousness, Drowsiness, Sleeping Less Than Usual, Sleeping More Than Usual, Trouble Falling Asleep    Ongoing education to be provided.     SHORT TERM GOAL #5:  Goal 5: Patient will complete functional expressive (functional phrases, conversational discourse, etc.) and receptive language

## 2024-08-03 NOTE — PROGRESS NOTES
HGB 11.5* 10.8*   HCT 36.1* 34.3*    388       CMP:  Recent Labs     08/01/24  0822 08/02/24  0617    136   K 4.0 4.4   CL 95* 95*   CO2 25 23   BUN 38* 60*   CREATININE 3.5* 4.7*   GLUCOSE 88 72   CALCIUM 9.3 9.2     Assessment and Plan:  ESRD on hemodialysis Monday Wednesday Friday  Volume status and electrolytes stable  No acute need for dialysis today  Metabolic acidosis.  Improved with the dialysis  Electrolytes within normal limits  Anemia in ESRD.  Hemoglobin stable.  Will monitor  Status post fall with femoral neck fracture status post surgery  Discussed with  and the patient  Currently undergoing rehab    To Cuadra MD  [facility]    This report has been created using voice recognition software. It may contain minor errors which are inherent in voice recognition technology

## 2024-08-03 NOTE — CARE COORDINATION
Resting this shift.  Telesitter remains in place and has alerted staff this shift.  Prineo in place and dry dressing in place with no drainage noted.  Turn and reposition, also turns herself.  Pillow propping used later in shift when she request to sleep on her back.  Pill crush in applesauce and no observed difficulty noted.  Had BM on 8/2 pre nurse at shift change.

## 2024-08-03 NOTE — CARE COORDINATION
Patient up to chair today with therapy, participated in all therapy today. Oxycodone 5mg given for pain today. Dr. Cuadra in to see patient today selevamer 800mg put on hold today. Dressing changed to right outer thigh tolerated well. Pills crushed in pudding or applesauce. No further concerns at this time.

## 2024-08-03 NOTE — PROGRESS NOTES
[facility]  Occupational Therapy  Daily Note  Time:   Time In: 830  Time Out: 930  Timed Code Treatment Minutes: 60 Minutes  Minutes: 60          Date: 8/3/2024  Patient Name: Deloris Watts,   Gender: female      Room: [facility]03/003-A  MRN: [id number]  : 1955  (68 y.o.)  Referring Practitioner: Jax Whatley MD  Diagnosis: closed fracture of neck of right femur, initial encounter  Additional Pertinent Hx: Deloris is a 68 year old female whom has been in/out of hospital for quite some time with varying medical conditions.  [facility] environment in which resulting in pain in LE with imaging indicating an acute fracture in the R femoral neck with diffuse osteopenia.  Pt. underwent open treatment to R femoral neck fracture with prothesis on 24 and is WBAT. Pt. admitted back to the [facility] on 24 for further medical care and referred to skilled OT services at this time.  See physicans H&P for additional information.    Restrictions/Precautions:  Restrictions/Precautions: Fall Risk, General Precautions, Weight Bearing, Surgical protocol  Right Lower Extremity Weight Bearing: Weight Bearing As Tolerated  Position Activity Restriction  Hip Precautions: No hip flexion > 90 degrees, No hip internal rotation, No hip external rotation, No ADduction     Social/Functional History:  Lives With: Spouse  Type of Home: House  Home Layout: One level, Able to Live on Main level with bedroom/bathroom, Performs ADL's on one level  Home Access: Ramped entrance (spouse assists pt up/down ramp with transport chair)  Home Equipment: Walker - Rolling, Walker - 4-Wheeled, Wheelchair - Manual, Lift chair, Reacher (transport chair)   Bathroom Shower/Tub: Tub/Shower unit  Bathroom Toilet: Handicap height  Bathroom Equipment: Grab bars in shower, Shower chair  Bathroom Accessibility: Accessible    Receives Help

## 2024-08-03 NOTE — PROGRESS NOTES
[facility]  INPATIENT PHYSICAL THERAPY  DAILY NOTE  [facility] - [id number]-03/003-A    Time In: 1330  Time Out: 1500  Timed Code Treatment Minutes: 90 Minutes  Minutes: 90          Date: 8/3/2024  Patient Name: Deloris Watts,  Gender:  female        MRN: [id number]  : 1955  (68 y.o.)     Referring Practitioner: Jax Whatley MD  Diagnosis: Closed fracture of neck of right demu, initial encounter ([facility])  Additional Pertinent Hx: Per EMR: \" Deloris is a 68 year old female whom has been in/out of hospital for quite some time with varying medical conditions.  Most recently pt [facility] due to a fall in the home environment in which resulting in pain in LE with imaging indicating an acute fracture in the R femoral neck with diffuse osteopenia.  Pt. underwent open treatment to R femoral neck fracture with prothesis on 24 and is WBAT with hip precautions in place. See physicans H&P for additional information.\"     Prior Level of Function:  Lives With: Spouse  Type of Home: House  Home Layout: One level, Able to Live on Main level with bedroom/bathroom, Performs ADL's on one level  Home Access: Ramped entrance (spouse assists pt up/down ramp with transport chair)  Home Equipment: Walker - Rolling, Walker - 4-Wheeled, Wheelchair - Manual, Lift chair, Reacher (transport chair)   Bathroom Shower/Tub: Tub/Shower unit  Bathroom Toilet: Handicap height  Bathroom Equipment: Grab bars in shower, Shower chair  Bathroom Accessibility: Accessible    Receives Help From: Family  ADL Assistance: Needs assistance  Homemaking Assistance: Needs assistance  Ambulation Assistance: Needs assistance  Transfer Assistance: Needs assistance  Active : No  Additional Comments: Pt's spouse assists with ADLs/IADLs, transfers and mobility tasks.    Restrictions/Precautions:  Restrictions/Precautions: Fall Risk, General Precautions, Weight Bearing, Surgical protocol  Right  Lower Extremity Weight Bearing: Weight Bearing As Tolerated  Position Activity Restriction  Hip Precautions: No hip flexion > 90 degrees, No hip internal rotation, No hip external rotation, No ADduction     SUBJECTIVE: Patient in recliner upon arrival and agreeable to therapy. Patient's  present to observe session. Patient attempted to use restroom at beginning of session, unsuccessful. Patient requested to return to bed at end of session.     PAIN: not rated, \"hurts a lot\", RN gave pain meds prior to session.     Vitals: Vitals not assessed per clinical judgement, see nursing flowsheet    OBJECTIVE:  Bed Mobility:  Sit to Supine: Moderate Assistance, with head of bed flat, with verbal cues , with increased time for completion     Transfers:  Sit to Stand: Maximum Assistance, X 1, with Lionel Stedy, with increased time for completion, with verbal cues, posterior lean, max cues and assist for anterior weight shift  Stand to Sit:Moderate Assistance, with Lionel Stedy, with increased time for completion, with verbal cues  To/From Bed and Chair: Dependent, with Lionel Stedy    Balance:  Static Sitting Balance:  Contact Guard Assistance, sitting without UE support on toilet  Dynamic Sitting Balance: Contact Guard Assistance, Minimal Assistance, X 1     -sitting in w/c, completed core exercises without UE support, required assist for midline and anterior trunk     -sitting in w/c, completed balloon toss ~3min using R and L UE to reach slightly outside DENNIS  Static Standing Balance: Moderate Assistance, Maximum Assistance, X 1.   -standing for clothing management, required Max A for balance in lionel stedy.  -Stood at RW 3x trials, first trial 15sec, second trial ~25sec, third trial ~20sec    Exercise:  Patient was guided in 1 set(s) 10 reps of exercise to both lower extremities.  Ankle pumps, Glut sets, Quad sets, Heelslides, Hip abduction/adduction, Straight leg raises, Seated marches, Seated heel/toe raises, Long arc

## 2024-08-04 PROCEDURE — 99232 SBSQ HOSP IP/OBS MODERATE 35: CPT | Performed by: INTERNAL MEDICINE

## 2024-08-04 PROCEDURE — 6360000002 HC RX W HCPCS: Performed by: PHYSICAL MEDICINE & REHABILITATION

## 2024-08-04 PROCEDURE — 6370000000 HC RX 637 (ALT 250 FOR IP): Performed by: PHYSICAL MEDICINE & REHABILITATION

## 2024-08-04 PROCEDURE — 1180000000 HC REHAB R&B

## 2024-08-04 RX ORDER — MEGESTROL ACETATE 40 MG/ML
200 SUSPENSION ORAL 2 TIMES DAILY
Status: DISCONTINUED | OUTPATIENT
Start: 2024-08-04 | End: 2024-08-07

## 2024-08-04 RX ADMIN — DOCUSATE SODIUM 100 MG: 50 LIQUID ORAL at 08:28

## 2024-08-04 RX ADMIN — PRIMIDONE 50 MG: 50 TABLET ORAL at 08:28

## 2024-08-04 RX ADMIN — FOLIC ACID 500 MCG: 1 TABLET ORAL at 08:28

## 2024-08-04 RX ADMIN — DOCUSATE SODIUM 100 MG: 50 LIQUID ORAL at 20:02

## 2024-08-04 RX ADMIN — ACETAMINOPHEN 650 MG: 325 TABLET ORAL at 11:42

## 2024-08-04 RX ADMIN — MEGESTROL ACETATE 200 MG: 400 SUSPENSION ORAL at 08:28

## 2024-08-04 RX ADMIN — CARBIDOPA AND LEVODOPA 1 TABLET: 10; 100 TABLET ORAL at 11:42

## 2024-08-04 RX ADMIN — CARBIDOPA AND LEVODOPA 1 TABLET: 10; 100 TABLET ORAL at 06:05

## 2024-08-04 RX ADMIN — OXYCODONE 5 MG: 5 TABLET ORAL at 06:04

## 2024-08-04 RX ADMIN — AMLODIPINE BESYLATE 10 MG: 10 TABLET ORAL at 08:28

## 2024-08-04 RX ADMIN — TRAZODONE HYDROCHLORIDE 100 MG: 100 TABLET ORAL at 20:02

## 2024-08-04 RX ADMIN — CLOPIDOGREL BISULFATE 75 MG: 75 TABLET ORAL at 08:28

## 2024-08-04 RX ADMIN — HEPARIN SODIUM 5000 UNITS: 5000 INJECTION INTRAVENOUS; SUBCUTANEOUS at 20:02

## 2024-08-04 RX ADMIN — SENNOSIDES 8.6 MG: 8.6 TABLET, FILM COATED ORAL at 20:02

## 2024-08-04 RX ADMIN — SERTRALINE 50 MG: 50 TABLET, FILM COATED ORAL at 08:27

## 2024-08-04 RX ADMIN — CARBIDOPA AND LEVODOPA 1 TABLET: 10; 100 TABLET ORAL at 15:24

## 2024-08-04 RX ADMIN — FLUTICASONE PROPIONATE 2 SPRAY: 50 SPRAY, METERED NASAL at 08:28

## 2024-08-04 RX ADMIN — MEGESTROL ACETATE 200 MG: 400 SUSPENSION ORAL at 20:02

## 2024-08-04 RX ADMIN — HEPARIN SODIUM 5000 UNITS: 5000 INJECTION INTRAVENOUS; SUBCUTANEOUS at 08:28

## 2024-08-04 NOTE — PLAN OF CARE
Problem: Skin/Tissue Integrity - Adult  Goal: Skin integrity remains intact  Outcome: Progressing  Flowsheets (Taken 8/4/2024 1416)  Skin Integrity Remains Intact: Monitor for areas of redness and/or skin breakdown     Problem: Musculoskeletal - Adult  Goal: Return mobility to safest level of function  Outcome: Progressing  Flowsheets (Taken 8/4/2024 1416)  Return Mobility to Safest Level of Function: Assess patient stability and activity tolerance for standing, transferring and ambulating with or without assistive devices     Problem: Gastrointestinal - Adult  Goal: Maintains adequate nutritional intake  Outcome: Progressing  Flowsheets (Taken 8/4/2024 1416)  Maintains adequate nutritional intake:   Monitor percentage of each meal consumed   Assist with meals as needed     Problem: Infection - Adult  Goal: Absence of infection at discharge  Flowsheets (Taken 8/4/2024 1416)  Absence of infection at discharge:   Assess and monitor for signs and symptoms of infection   Administer medications as ordered   Monitor lab/diagnostic results

## 2024-08-04 NOTE — PROGRESS NOTES
Physical Medicine & Rehabilitation Progress Note    Chief Complaint:  Right hip femoral neck fracture due to fall requiring right hemiarthroplasty surgery     Subjective:    Deloris Watts is a 68 y.o. right-handed slim  female with history of hypertension, hyperlipidemia, hydronephrosis, fibromyalgia, irritable bowel syndrome, essential tremor, osteoporosis, end-stage renal disease on hemodialysis (MWF) since January 2023, stroke with left side weakness in 1990s, anemia, parkinsonism, osteoporosis, depression, anxiety, right wrist and right ankle fracture treated conservatively, right proximal humeral fracture due to fall requiring ORIF, status post right carpal tunnel release surgery, status post cervical spine surgery, status post hysterectomy and bilateral oophorectomy, hemorrhoidectomy, sinus surgery, tubal ligation, L2 compression fracture requiring kyphoplasty, LASEK surgery, left femur neck displaced fracture due to fall on 3/10/2024 requiring left hip hemiarthroplasty on 3/11/2024, was admitted on 7/30/2024 for intensive inpatient management of impairment & disability secondary to right hip femur neck fracture due to fall requiring right hemiarthroplasty surgery.     The patient was known to inpatient rehab service.  She was previously admitted to inpatient rehab service on 5/29/2024 for small acute left parafalcine subdural hematoma and cerebral concussion secondary to fall accident on 5/25/2024.  She was discharged home on 6/15/2024 with referral for home care service.     The patient recently was hospitalized from 7/8/2024 to 7/11/2024 for headache.  At that time MRI of brain was performed on 7/9/2024 and showed no acute infarction but old right frontal lobe infarct.     The patient says she was going to bathroom at that time with her walker on 7/25/2024.  She left the walker outside the bathroom and went in without the walker.  Her  was in the bedroom at that time.  The patient  apparently slipped and fell and landed on her right side hitting her right hip.  She immediately feel pain at her right hip and was unable to get up.  She told her  that she did not hit her head.  She did not lose consciousness at the time.  Her  called 911 and the patient was brought to [facility] ER by EMS for evaluation.  X-ray of right hip and pelvis done on 7/25/2024 revealed acute right femoral neck fracture with diffuse osteopenia.  She was admitted.  Orthopedics was consulted on 7/26/2024 and surgical intervention was planned.  Nephrology service was also consulted to continue patient's hemodialysis 3 times weekly on Monday, Wednesday and Friday.  The patient then underwent open treatment of right femoral neck fracture with prosthesis by Dr. Ebenezer Mantilla on 7/26/2024.  She is allowed to have weightbearing as tolerated on right lower extremity postoperatively with total hip arthroplasty protocol.  PM&R was consulted on 7/28/2024 and intensive inpatient rehabilitation treatment was considered.  Speech therapy evaluation and treatment was also ordered to address patient's impaired cognition.  The patient was evaluated by speech therapist on 7/29/2024 and oropharyngeal dysphagia was suspected craniocaudally.  Diet consistency changed to puréed and thin liquid diet was recommended by speech therapist after evaluation.  Modified barium swallowing study was done on 7/30/2024.  Purée and thin liquid diet was continued after modified barium swallowing study was performed.    A family meeting took place on Friday 8/2/2024 with the presence of palliative care.  The patient was not made DNR-CCA after the family meeting.  The family plan to have patient moved to skilled nursing facility after she is discharged from acute rehab.  The patient's  says the patient was doing well over the weekend and fully participate the therapy treatment on Saturday well.  The patient says she feels more

## 2024-08-04 NOTE — PROGRESS NOTES
[facility]  Diagnosis List for Inpatient Rehab facility (IRF) - Patient Assessment Instrument (DONALD)    Patient Name: Deloris Watts        MRN: [id number]    : 1955  (68 y.o.)  Gender: female     Primary impairment requiring rehabilitation: 8.11 Unilateral Hip Fracture      Etiologic Diagnosis that led to the condition: Acute right femoral neck fracture     Comorbid conditions affecting rehabilitation:  Acute right femoral neck fracture due to fall accident requiring right hemiarthroplasty surgery  History of stroke (right frontal lobe infarct) with left hemiparesis  Mental status change with worsening of cognition and mentation, to rule out intracranial abnormality, to rule out sepsis  End-stage renal disease requiring hemodialysis (Monday, Wednesday and Friday)  History of fall resulting liver laceration and acute L2 compression fracture requiring kyphoplasty   History of fall on 3/10/2024 resulting displaced left femur neck fracture requiring left hip hemiarthroplasty surgery  Parkinsonism  Essential hypertension  Oropharyngeal dysphagia  Irritable bowel syndrome  Hyperlipidemia  Osteoporosis  Essential tremor  GERD  Cognitive impairment  History of hydronephrosis  History of fibromyalgia  History of depression and anxiety  History of right proximal humerus fracture requiring ORIF  History of right wrist fracture requiring casting  History of right ankle fracture requiring casting    ANTON DAVIS MD

## 2024-08-04 NOTE — PLAN OF CARE
Problem: Discharge Planning  Goal: Discharge to home or other facility with appropriate resources  Outcome: Progressing  Flowsheets (Taken 8/3/2024 2119)  Discharge to home or other facility with appropriate resources: Identify barriers to discharge with patient and caregiver     Problem: Safety - Adult  Goal: Free from fall injury  Outcome: Progressing     Problem: Pain  Goal: Verbalizes/displays adequate comfort level or baseline comfort level  Outcome: Progressing  Flowsheets (Taken 8/3/2024 2115)  Verbalizes/displays adequate comfort level or baseline comfort level:   Encourage patient to monitor pain and request assistance   Assess pain using appropriate pain scale   Administer analgesics based on type and severity of pain and evaluate response   Implement non-pharmacological measures as appropriate and evaluate response     Problem: Skin/Tissue Integrity  Goal: Absence of new skin breakdown  Description: 1.  Monitor for areas of redness and/or skin breakdown  2.  Assess vascular access sites hourly  3.  Every 4-6 hours minimum:  Change oxygen saturation probe site  4.  Every 4-6 hours:  If on nasal continuous positive airway pressure, respiratory therapy assess nares and determine need for appliance change or resting period.  Outcome: Progressing     Problem: ABCDS Injury Assessment  Goal: Absence of physical injury  Outcome: Progressing     Problem: Chronic Conditions and Co-morbidities  Goal: Patient's chronic conditions and co-morbidity symptoms are monitored and maintained or improved  Outcome: Progressing  Flowsheets (Taken 8/3/2024 2119)  Care Plan - Patient's Chronic Conditions and Co-Morbidity Symptoms are Monitored and Maintained or Improved: Monitor and assess patient's chronic conditions and comorbid symptoms for stability, deterioration, or improvement

## 2024-08-04 NOTE — PROGRESS NOTES
Patient educated on how to use incentive spirometer. Patient can not verbalize understanding and or demonstrate proper use. Emphasized importance and usage of device, with coughing and deep breathing every 4 hours while awake.

## 2024-08-04 NOTE — CARE COORDINATION
Tolerating pills crushed in applesauce well.  CHG bath give this am.  Assisted with turning this shift and pillow propping.  Also noted to be turning herself Drsg to incision is dry and intact.

## 2024-08-04 NOTE — PROGRESS NOTES
[facility]   Nephrology progress note  8/4/2024, 11:45 AM      Pt Name:    Deloris Watts  MRN:     [id number]     YOB: 1955  Admit Date:    7/30/2024  6:02 PM    Chief Complaint: Nephrology following for ESRD    Subjective:  Patient seen and examined  Feels okay no complaints    Objective:  24HR INTAKE/OUTPUT:    Intake/Output Summary (Last 24 hours) at 8/4/2024 1145  Last data filed at 8/4/2024 0603  Gross per 24 hour   Intake 700 ml   Output --   Net 700 ml      Admission weight: 49.1 kg (108 lb 3.9 oz)  Wt Readings from Last 3 Encounters:   08/02/24 50.5 kg (111 lb 5.3 oz)   07/29/24 48.7 kg (107 lb 5.8 oz)   07/10/24 46.6 kg (102 lb 11.8 oz)        Vitals :   Vitals:    08/03/24 1304 08/03/24 1334 08/03/24 2115 08/04/24 0823   BP:   129/73 121/78   Pulse:   94 94   Resp: 16 16 16 16   Temp:   98.2 °F (36.8 °C)    TempSrc:   Oral Oral   SpO2:   98% 97%   Weight:       Height:           Physical examination  General Appearance:  appears comfortable, no distress  Neck: No JVD noted  Lungs clear  Heart: S1-S2  Psych: Not agitated  CNS grossly intact    Medications:  Infusion:    sodium chloride       Meds:    megestrol  200 mg Oral BID    docusate  100 mg Oral BID    oxyCODONE  5 mg Oral Q24H    carbidopa-levodopa  1 tablet Oral TID    traZODone  100 mg Oral Nightly    buPROPion  150 mg Oral QAM    buPROPion  300 mg Oral Daily    fluticasone  2 spray Each Nostril Daily    folic acid  500 mcg Oral Daily    primidone  50 mg Oral Daily    senna  1 tablet Oral Nightly    [Held by provider] sevelamer  800 mg Oral BID with meals    sertraline  50 mg Oral Daily    amLODIPine  10 mg Oral Daily    clopidogrel  75 mg Oral Daily    acetaminophen  650 mg Oral Q6H    heparin (porcine)  5,000 Units SubCUTAneous BID     Lab Data :  CBC:   Recent Labs     08/02/24  0617   WBC 11.9*   HGB 10.8*   HCT 34.3*          CMP:  Recent Labs     08/02/24  0617      K 4.4   CL 95*   CO2 23

## 2024-08-05 ENCOUNTER — APPOINTMENT (OUTPATIENT)
Dept: GENERAL RADIOLOGY | Age: 69
DRG: 559 | End: 2024-08-05
Attending: PHYSICAL MEDICINE & REHABILITATION
Payer: MEDICARE

## 2024-08-05 LAB
ANION GAP SERPL CALC-SCNC: 19 MEQ/L (ref 8–16)
BACTERIA URNS QL MICRO: ABNORMAL /HPF
BASOPHILS ABSOLUTE: 0.3 THOU/MM3 (ref 0–0.1)
BASOPHILS NFR BLD AUTO: 1.7 %
BILIRUB UR QL STRIP.AUTO: NEGATIVE
BUN SERPL-MCNC: 77 MG/DL (ref 7–22)
CALCIUM SERPL-MCNC: 9.2 MG/DL (ref 8.5–10.5)
CASTS #/AREA URNS LPF: ABNORMAL /LPF
CASTS 2: ABNORMAL /LPF
CHARACTER UR: CLEAR
CHLORIDE SERPL-SCNC: 94 MEQ/L (ref 98–111)
CO2 SERPL-SCNC: 22 MEQ/L (ref 23–33)
COLOR, UA: YELLOW
CREAT SERPL-MCNC: 5.5 MG/DL (ref 0.4–1.2)
CRYSTALS URNS MICRO: ABNORMAL
DEPRECATED RDW RBC AUTO: 59.9 FL (ref 35–45)
ELLIPTOCYTES: ABNORMAL
EOSINOPHIL NFR BLD AUTO: 5.4 %
EOSINOPHILS ABSOLUTE: 0.8 THOU/MM3 (ref 0–0.4)
EPITHELIAL CELLS, UA: ABNORMAL /HPF
ERYTHROCYTE [DISTWIDTH] IN BLOOD BY AUTOMATED COUNT: 16.7 % (ref 11.5–14.5)
GFR SERPL CREATININE-BSD FRML MDRD: 8 ML/MIN/1.73M2
GLUCOSE SERPL-MCNC: 95 MG/DL (ref 70–108)
GLUCOSE UR QL STRIP.AUTO: 100 MG/DL
HCT VFR BLD AUTO: 34.8 % (ref 37–47)
HGB BLD-MCNC: 11 GM/DL (ref 12–16)
HGB UR QL STRIP.AUTO: NEGATIVE
IMM GRANULOCYTES # BLD AUTO: 1.4 THOU/MM3 (ref 0–0.07)
IMM GRANULOCYTES NFR BLD AUTO: 9.2 %
KETONES UR QL STRIP.AUTO: NEGATIVE
LYMPHOCYTES ABSOLUTE: 2.7 THOU/MM3 (ref 1–4.8)
LYMPHOCYTES NFR BLD AUTO: 17.8 %
MCH RBC QN AUTO: 31 PG (ref 26–33)
MCHC RBC AUTO-ENTMCNC: 31.6 GM/DL (ref 32.2–35.5)
MCV RBC AUTO: 98 FL (ref 81–99)
MISCELLANEOUS 2: ABNORMAL
MONOCYTES ABSOLUTE: 0.9 THOU/MM3 (ref 0.4–1.3)
MONOCYTES NFR BLD AUTO: 6.2 %
NEUTROPHILS ABSOLUTE: 9.1 THOU/MM3 (ref 1.8–7.7)
NEUTROPHILS NFR BLD AUTO: 59.7 %
NITRITE UR QL STRIP: NEGATIVE
NRBC BLD AUTO-RTO: 0 /100 WBC
PH UR STRIP.AUTO: 7.5 [PH] (ref 5–9)
PLATELET # BLD AUTO: 619 THOU/MM3 (ref 130–400)
PLATELET BLD QL SMEAR: ABNORMAL
PMV BLD AUTO: 10.4 FL (ref 9.4–12.4)
POIKILOCYTES: ABNORMAL
POLYCHROMASIA BLD QL SMEAR: ABNORMAL
POTASSIUM SERPL-SCNC: 4.6 MEQ/L (ref 3.5–5.2)
POTASSIUM SERPL-SCNC: 4.6 MEQ/L (ref 3.5–5.2)
PROT UR STRIP.AUTO-MCNC: 100 MG/DL
RBC # BLD AUTO: 3.55 MILL/MM3 (ref 4.2–5.4)
RBC URINE: ABNORMAL /HPF
RENAL EPI CELLS #/AREA URNS HPF: ABNORMAL /[HPF]
SCAN OF BLOOD SMEAR: NORMAL
SODIUM SERPL-SCNC: 135 MEQ/L (ref 135–145)
SP GR UR REFRACT.AUTO: 1.01 (ref 1–1.03)
TOXIC GRANULES BLD QL SMEAR: PRESENT
UROBILINOGEN, URINE: 0.2 EU/DL (ref 0–1)
WBC # BLD AUTO: 15.2 THOU/MM3 (ref 4.8–10.8)
WBC #/AREA URNS HPF: ABNORMAL /HPF
WBC #/AREA URNS HPF: NEGATIVE /[HPF]
YEAST LIKE FUNGI URNS QL MICRO: ABNORMAL

## 2024-08-05 PROCEDURE — 97129 THER IVNTJ 1ST 15 MIN: CPT

## 2024-08-05 PROCEDURE — 99232 SBSQ HOSP IP/OBS MODERATE 35: CPT | Performed by: INTERNAL MEDICINE

## 2024-08-05 PROCEDURE — 6360000002 HC RX W HCPCS: Performed by: PHYSICAL MEDICINE & REHABILITATION

## 2024-08-05 PROCEDURE — 87040 BLOOD CULTURE FOR BACTERIA: CPT

## 2024-08-05 PROCEDURE — 97110 THERAPEUTIC EXERCISES: CPT

## 2024-08-05 PROCEDURE — 81001 URINALYSIS AUTO W/SCOPE: CPT

## 2024-08-05 PROCEDURE — 97535 SELF CARE MNGMENT TRAINING: CPT

## 2024-08-05 PROCEDURE — 99232 SBSQ HOSP IP/OBS MODERATE 35: CPT | Performed by: PHYSICAL MEDICINE & REHABILITATION

## 2024-08-05 PROCEDURE — 6370000000 HC RX 637 (ALT 250 FOR IP): Performed by: PHYSICAL MEDICINE & REHABILITATION

## 2024-08-05 PROCEDURE — 85025 COMPLETE CBC W/AUTO DIFF WBC: CPT

## 2024-08-05 PROCEDURE — 97112 NEUROMUSCULAR REEDUCATION: CPT

## 2024-08-05 PROCEDURE — 97530 THERAPEUTIC ACTIVITIES: CPT

## 2024-08-05 PROCEDURE — 1180000000 HC REHAB R&B

## 2024-08-05 PROCEDURE — 92526 ORAL FUNCTION THERAPY: CPT

## 2024-08-05 PROCEDURE — 36415 COLL VENOUS BLD VENIPUNCTURE: CPT

## 2024-08-05 PROCEDURE — 71046 X-RAY EXAM CHEST 2 VIEWS: CPT

## 2024-08-05 PROCEDURE — 80048 BASIC METABOLIC PNL TOTAL CA: CPT

## 2024-08-05 PROCEDURE — 90935 HEMODIALYSIS ONE EVALUATION: CPT

## 2024-08-05 PROCEDURE — 97116 GAIT TRAINING THERAPY: CPT

## 2024-08-05 RX ORDER — POLYETHYLENE GLYCOL 3350 17 G/17G
17 POWDER, FOR SOLUTION ORAL DAILY
Status: DISCONTINUED | OUTPATIENT
Start: 2024-08-06 | End: 2024-08-09 | Stop reason: HOSPADM

## 2024-08-05 RX ORDER — SENNOSIDES A AND B 8.6 MG/1
2 TABLET, FILM COATED ORAL NIGHTLY
Status: DISCONTINUED | OUTPATIENT
Start: 2024-08-06 | End: 2024-08-09 | Stop reason: HOSPADM

## 2024-08-05 RX ADMIN — SERTRALINE 50 MG: 50 TABLET, FILM COATED ORAL at 08:54

## 2024-08-05 RX ADMIN — HEPARIN SODIUM 5000 UNITS: 5000 INJECTION INTRAVENOUS; SUBCUTANEOUS at 08:54

## 2024-08-05 RX ADMIN — AMLODIPINE BESYLATE 10 MG: 10 TABLET ORAL at 08:55

## 2024-08-05 RX ADMIN — HEPARIN SODIUM 5000 UNITS: 5000 INJECTION INTRAVENOUS; SUBCUTANEOUS at 20:48

## 2024-08-05 RX ADMIN — ACETAMINOPHEN 650 MG: 325 TABLET ORAL at 12:03

## 2024-08-05 RX ADMIN — FLUTICASONE PROPIONATE 2 SPRAY: 50 SPRAY, METERED NASAL at 08:54

## 2024-08-05 RX ADMIN — CARBIDOPA AND LEVODOPA 1 TABLET: 10; 100 TABLET ORAL at 06:00

## 2024-08-05 RX ADMIN — PRIMIDONE 50 MG: 50 TABLET ORAL at 08:55

## 2024-08-05 RX ADMIN — ACETAMINOPHEN 650 MG: 325 TABLET ORAL at 17:41

## 2024-08-05 RX ADMIN — OXYCODONE 5 MG: 5 TABLET ORAL at 06:32

## 2024-08-05 RX ADMIN — FOLIC ACID 500 MCG: 1 TABLET ORAL at 08:54

## 2024-08-05 RX ADMIN — ACETAMINOPHEN 650 MG: 325 TABLET ORAL at 05:59

## 2024-08-05 RX ADMIN — DOCUSATE SODIUM 100 MG: 50 LIQUID ORAL at 08:55

## 2024-08-05 RX ADMIN — DOCUSATE SODIUM 100 MG: 50 LIQUID ORAL at 20:48

## 2024-08-05 RX ADMIN — TRAZODONE HYDROCHLORIDE 100 MG: 100 TABLET ORAL at 20:49

## 2024-08-05 RX ADMIN — MEGESTROL ACETATE 200 MG: 400 SUSPENSION ORAL at 08:55

## 2024-08-05 RX ADMIN — SENNOSIDES 8.6 MG: 8.6 TABLET, FILM COATED ORAL at 20:49

## 2024-08-05 RX ADMIN — MEGESTROL ACETATE 200 MG: 400 SUSPENSION ORAL at 20:48

## 2024-08-05 RX ADMIN — CLOPIDOGREL BISULFATE 75 MG: 75 TABLET ORAL at 08:55

## 2024-08-05 RX ADMIN — CARBIDOPA AND LEVODOPA 1 TABLET: 10; 100 TABLET ORAL at 12:03

## 2024-08-05 ASSESSMENT — ENCOUNTER SYMPTOMS
BACK PAIN: 0
COUGH: 0
SHORTNESS OF BREATH: 0
ABDOMINAL PAIN: 0
NAUSEA: 0
WHEEZING: 0
RHINORRHEA: 0
VOMITING: 0
DIARRHEA: 0
CONSTIPATION: 0

## 2024-08-05 NOTE — PROGRESS NOTES
Physical Medicine & Rehabilitation Progress Note    Chief Complaint:  Right hip femoral neck fracture due to fall requiring right hemiarthroplasty surgery     Subjective:    Deloris Watts is a 68 y.o. right-handed slim  female with history of hypertension, hyperlipidemia, hydronephrosis, fibromyalgia, irritable bowel syndrome, essential tremor, osteoporosis, end-stage renal disease on hemodialysis (MWF) since January 2023, stroke with left side weakness in 1990s, anemia, parkinsonism, osteoporosis, depression, anxiety, right wrist and right ankle fracture treated conservatively, right proximal humeral fracture due to fall requiring ORIF, status post right carpal tunnel release surgery, status post cervical spine surgery, status post hysterectomy and bilateral oophorectomy, hemorrhoidectomy, sinus surgery, tubal ligation, L2 compression fracture requiring kyphoplasty, LASEK surgery, left femur neck displaced fracture due to fall on 3/10/2024 requiring left hip hemiarthroplasty on 3/11/2024, was admitted on 7/30/2024 for intensive inpatient management of impairment & disability secondary to right hip femur neck fracture due to fall requiring right hemiarthroplasty surgery.     The patient was known to inpatient rehab service.  She was previously admitted to inpatient rehab service on 5/29/2024 for small acute left parafalcine subdural hematoma and cerebral concussion secondary to fall accident on 5/25/2024.  She was discharged home on 6/15/2024 with referral for home care service.     The patient recently was hospitalized from 7/8/2024 to 7/11/2024 for headache.  At that time MRI of brain was performed on 7/9/2024 and showed no acute infarction but old right frontal lobe infarct.     The patient says she was going to bathroom at that time with her walker on 7/25/2024.  She left the walker outside the bathroom and went in without the walker.  Her  was in the bedroom at that time.  The patient  low  (H): Data is abnormally high  !: Data is abnormal       Latest Reference Range & Units 08/01/24 08:22 08/02/24 06:17 08/06/24 07:00   Procalcitonin 0.01 - 0.09 ng/mL 1.25 (H) 1.33 (H) 1.05 (H)   (H): Data is abnormally high       Latest Reference Range & Units 08/02/24 06:17 08/05/24 06:15 08/06/24 07:00   WBC 4.8 - 10.8 thou/mm3 11.9 (H) 15.2 (H) 13.9 (H)   RBC 4.20 - 5.40 mill/mm3 3.53 (L) 3.55 (L) 3.76 (L)   Hemoglobin Quant 12.0 - 16.0 gm/dl 10.8 (L) 11.0 (L) 11.5 (L)   Hematocrit 37.0 - 47.0 % 34.3 (L) 34.8 (L) 36.7 (L)   MCV 81.0 - 99.0 fL 97.2 98.0 97.6   MCH 26.0 - 33.0 pg 30.6 31.0 30.6   MCHC 32.2 - 35.5 gm/dl 31.5 (L) 31.6 (L) 31.3 (L)   MPV 9.4 - 12.4 fL 10.7 10.4    RDW-CV 11.5 - 14.5 % 16.4 (H) 16.7 (H) 17.0 (H)   RDW-SD 35.0 - 45.0 fL 58.5 (H) 59.9 (H) 61.6 (H)   Platelet Count 130 - 400 thou/mm3 388 619 (H) 644 (H)   Platelet Estimate Adequate  ADEQUATE INCREASED    BASOPHILIA Absent   1+    Monocytes % % 10.0 6.2    Neutrophils Absolute 1.8 - 7.7 thou/mm3 6.2 9.1 (H)    Lymphocytes Absolute 1.0 - 4.8 thou/mm3 2.5 2.7    Monocytes Absolute 0.4 - 1.3 thou/mm3 1.2 0.9    Eosinophils Absolute 0.0 - 0.4 thou/mm3 0.6 (H) 0.8 (H)    Basophils Absolute 0.0 - 0.1 thou/mm3 0.1 0.3 (H)    Seg Neutrophils % 52.2 59.7    Lymphocytes % 21.2 17.8    Eosinophils % 5.3 5.4    Basophils % 0.6 1.7    Immature Grans (Abs) 0.00 - 0.07 thou/mm3 1.28 (H) 1.40 (H)    Immature Granulocytes % % 10.7 9.2    Nucleated Red Blood Cells /100 wbc 0 0    Toxic Granulation Absent  Present Present    Poikilocytosis Absent   1+    Elliptocytes Absent   1+    SCAN OF BLOOD SMEAR  see below see below    (H): Data is abnormally high  (L): Data is abnormally low      Blood culture (8/5/2024) :  Result pending      Chest x-ray (8/5/2024) :  IMPRESSION:  1. Normal heart size. Prior anterior cervical fusion. Prior surgery proximal right humerus. Right jugular dialysis catheter with tip in right atrium.  2. Mild atelectatic stranding

## 2024-08-05 NOTE — PLAN OF CARE
Problem: Discharge Planning  Goal: Discharge to home or other facility with appropriate resources  Note: VARSHA spoke with Vonda from the [facility]. oVnda reported that they had a Medicaid pending bed made available over the weekend. They reviewed patient's case and are able to accept her for services. SW to speak with Tenzin regarding discharge plan.    VARSHA met with Tenzin to discuss discharge planning. VARSHA and Tenzin discussed patient having good and bad days. SW informed Tenzin [facility] having a Medicaid pending bed available. Tenzin would like patient to still receiving PT. Tenzin would like to pursue admission to the [facility] by the end of this week.    VARSHA spoke with Vonda. Vonda reports that she would like to determine dialysis transport prior to admission. [facility] will be working on this date. Patient will be admitted skilled then transition to Medicaid pending once she completes therapy. Potential discharge on Thrusday, 8/8 pending transport.    VARSHA spoke with Tenzin to update him on progress. Tenzin is in agreement with this plan. SW to follow up with patient and Tenzin on 8/6 to discuss final discharge plans.    SW will follow and maintain involvement in discharge planning.

## 2024-08-05 NOTE — CONSULTS
Pressure Mother     Heart Disease Mother     Heart Disease Father     Parkinsonism Father     Neurofibromatosis Father     Depression Father     Alcohol Abuse Father     Neurofibromatosis Sister     Diabetes Sister     High Blood Pressure Sister     Other Sister         suicide    Depression Sister     Neurofibromatosis Brother     Dementia Brother     Mult Sclerosis Brother     Hypertension Brother     Diabetes Brother      REVIEW OF SYSTEMS:    CONSTITUTIONAL:  positive for fatigue  EYES:  negative for discharge  HEENT:  negative  RESPIRATORY:  negative for dyspnea  CARDIOVASCULAR:  negative  GASTROINTESTINAL:  negative  GENITOURINARY:  negative for dysuria  INTEGUMENT/BREAST:  negative  HEMATOLOGIC/LYMPHATIC:  negative for petechiae   ALLERGIC/IMMUNOLOGIC:  negative  ENDOCRINE:  negative for diabetic symptoms including polydipsia  MUSCULOSKELETAL:  positive for  myalgias, arthralgias, and muscle weakness  NEUROLOGICAL:  positive for coordination problems, gait problems, and weakness  BEHAVIOR/PSYCH:  negative for increased agitation  PHYSICAL EXAM:      Vitals:    /74   Pulse 100   Temp 98.3 °F (36.8 °C)   Resp 18   Ht 1.524 m (5')   Wt 51 kg (112 lb 7 oz)   SpO2 95%   BMI 21.96 kg/m²     Well developed well nourished white female who is awake alert and cooperative  Skin atrophic  Membranes moist  Head normocephalic  Neck without mass  Chest symmetrical expansion, dialysis cath in the upper right chest  Heart S1S2 without murmur  Lungs CTA  Abd soft, non tender, normoactive BS and no mass  Ext without edema  Neuro weak  Psy pleasant      IMPRESSION/RECOMMENDATIONS:      Active Hospital Problems    Diagnosis Date Noted    Anemia due to chronic kidney disease, on chronic dialysis ([facility]) [N18.6, D63.1, Z99.2] 12/21/2022     Priority: Medium    Depression [F32.A] 12/21/2022     Priority: Medium    Closed fracture of neck of right femur, initial encounter ([facility]) [S72.001A] 07/30/2024    Oropharyngeal

## 2024-08-05 NOTE — FLOWSHEET NOTE
08/05/24 1330 08/05/24 1620   Vital Signs   /67 139/74   Temp 98.3 °F (36.8 °C) 98.3 °F (36.8 °C)   Pulse 83 100   Respirations 18 18   SpO2 95 % 95 %   Weight - Scale 52 kg (114 lb 10.2 oz) 51 kg (112 lb 7 oz)   Weight Method Bed scale Bed scale   Percent Weight Change 2.97 0.99   Post-Hemodialysis Assessment   Post-Treatment Procedures  --  Blood returned;Catheter Capped, clamped with Saline x2 ports   Machine Disinfection Process  --  Acid/Vinegar Clean;Heat Disinfect;Exterior Machine Disinfection   Blood Volume Processed (Liters)  --  56.7 L   Dialyzer Clearance  --  Lightly streaked   Duration of Treatment (minutes)  --  150 minutes   Heparin Amount Administered During Treatment (mL)  --  0 mL   Hemodialysis Intake (ml)  --  400 ml   Hemodialysis Output (ml)  --  1400 ml   NET Removed (ml)  --  1000   Tolerated Treatment  --  Good     Stable 2.5 hour TX completed. Removed 2 l of fluid. Tolerated fluid removal well. Bilateral cath ports flushed with normal saline, clamped, and secured with tegos. CVC drsg clean, dry, and intact. Report called to primary RN.

## 2024-08-05 NOTE — PROGRESS NOTES
[facility]   Nephrology progress note  8/5/2024, 9:53 AM      Pt Name:    Deloris Watts  MRN:     [id number]     YOB: 1955  Admit Date:    7/30/2024  6:02 PM    Chief Complaint: Nephrology following for ESRD    Subjective:  Patient seen and examined  Feels okay no complaints  No cp or SOB .mental status looks good    Objective:  24HR INTAKE/OUTPUT:    Intake/Output Summary (Last 24 hours) at 8/5/2024 0953  Last data filed at 8/5/2024 0928  Gross per 24 hour   Intake 440 ml   Output --   Net 440 ml      Admission weight: 49.1 kg (108 lb 3.9 oz)  Wt Readings from Last 3 Encounters:   08/02/24 50.5 kg (111 lb 5.3 oz)   07/29/24 48.7 kg (107 lb 5.8 oz)   07/10/24 46.6 kg (102 lb 11.8 oz)        Vitals :   Vitals:    08/04/24 2000 08/05/24 0632 08/05/24 0702 08/05/24 0844   BP: 122/83   115/89   Pulse: 94   89   Resp: 16 16 16 18   Temp: 98.2 °F (36.8 °C)   97.7 °F (36.5 °C)   TempSrc: Oral   Oral   SpO2: 95%   93%   Weight:       Height:           Physical examination  General Appearance:  appears comfortable, no distress  Neck: No JVD noted  Lungs clear  Heart: S1-S2  Psych: Not agitated  CNS grossly intact    Medications:  Infusion:    sodium chloride       Meds:    megestrol  200 mg Oral BID    docusate  100 mg Oral BID    oxyCODONE  5 mg Oral Q24H    carbidopa-levodopa  1 tablet Oral TID    traZODone  100 mg Oral Nightly    buPROPion  150 mg Oral QAM    buPROPion  300 mg Oral Daily    fluticasone  2 spray Each Nostril Daily    folic acid  500 mcg Oral Daily    primidone  50 mg Oral Daily    senna  1 tablet Oral Nightly    [Held by provider] sevelamer  800 mg Oral BID with meals    sertraline  50 mg Oral Daily    amLODIPine  10 mg Oral Daily    clopidogrel  75 mg Oral Daily    acetaminophen  650 mg Oral Q6H    heparin (porcine)  5,000 Units SubCUTAneous BID     Lab Data :  CBC:   Recent Labs     08/05/24  0615   WBC 15.2*   HGB 11.0*   HCT 34.8*   *       CMP:  Recent Labs

## 2024-08-05 NOTE — PLAN OF CARE
Problem: Discharge Planning  Goal: Discharge to home or other facility with appropriate resources  Outcome: Progressing  Flowsheets (Taken 8/4/2024 2312)  Discharge to home or other facility with appropriate resources: Identify barriers to discharge with patient and caregiver     Problem: Safety - Adult  Goal: Free from fall injury  Outcome: Progressing  Flowsheets (Taken 8/4/2024 2312)  Free From Fall Injury: Instruct family/caregiver on patient safety  Note: Patient has remained free of physical injury during this shift. Safe environment provided, call light within reach, and hourly rounding completed.      Problem: Pain  Goal: Verbalizes/displays adequate comfort level or baseline comfort level  Outcome: Progressing  Flowsheets (Taken 8/4/2024 2312)  Verbalizes/displays adequate comfort level or baseline comfort level:   Encourage patient to monitor pain and request assistance   Administer analgesics based on type and severity of pain and evaluate response   Assess pain using appropriate pain scale   Implement non-pharmacological measures as appropriate and evaluate response     Problem: ABCDS Injury Assessment  Goal: Absence of physical injury  Outcome: Progressing  Flowsheets (Taken 8/4/2024 2312)  Absence of Physical Injury: Implement safety measures based on patient assessment  Note: Patient has remained free of physical injury during this shift. Safe environment provided, call light within reach, and hourly rounding completed.      Problem: Chronic Conditions and Co-morbidities  Goal: Patient's chronic conditions and co-morbidity symptoms are monitored and maintained or improved  Outcome: Progressing  Flowsheets (Taken 8/4/2024 2312)  Care Plan - Patient's Chronic Conditions and Co-Morbidity Symptoms are Monitored and Maintained or Improved: Monitor and assess patient's chronic conditions and comorbid symptoms for stability, deterioration, or improvement   Care plan reviewed with patient.  Patient  verbalize understanding of the plan of care and contribute to goal setting.

## 2024-08-05 NOTE — PROGRESS NOTES
[facility]  INPATIENT PHYSICAL THERAPY  DAILY NOTE  [facility] - 8K-03/003-A      Discharge Recommendations: Continue to assess pending progress and Subacte/Skilled Nursing Facility  Equipment Recommendations:Equipment Needed: No    Time In: 0700  Time Out: 0832  Timed Code Treatment Minutes: 92 Minutes  Minutes: 92          Date: 2024  Patient Name: Deloris Watts,  Gender:  female        MRN: [id number]  : 1955  (68 y.o.)     Referring Practitioner: Jax Whatley MD  Diagnosis: Closed fracture of neck of right demu, initial encounter ([facility])  Additional Pertinent Hx: Per EMR: \" Deloris is a 68 year old female whom has been in/out of hospital for quite some time with varying medical conditions.  Most recently pt re-addmitted back to the hosital due to a fall in the home environment in which resulting in pain in LE with imaging indicating an acute fracture in the R femoral neck with diffuse osteopenia.  Pt. underwent open treatment to R femoral neck fracture with prothesis on 24 and is WBAT with hip precautions in place. See physicans H&P for additional information.\"     Prior Level of Function:  Lives With: Spouse  Type of Home: House  Home Layout: One level, Able to Live on Main level with bedroom/bathroom, Performs ADL's on one level  Home Access: Ramped entrance (spouse assists pt up/down ramp with transport chair)  Home Equipment: Walker - Rolling, Walker - 4-Wheeled, Wheelchair - Manual, Lift chair, Reacher (transport chair)   Bathroom Shower/Tub: Tub/Shower unit  Bathroom Toilet: Handicap height  Bathroom Equipment: Grab bars in shower, Shower chair  Bathroom Accessibility: Accessible    Receives Help From: Family  ADL Assistance: Needs assistance  Homemaking Assistance: Needs assistance  Ambulation Assistance: Needs assistance  Transfer Assistance: Needs assistance  Active : No  Additional Comments: Pt's spouse assists with ADLs/IADLs,  standing at walker. Completed to improve anterior and lateral weight shift, midline orientation, LE proprioception and postural awareness.   ** Pt completed with moderate verbal and tactile cueing to anteriorly shift her weight and place her hands out in front of her to allow equal weight bearing through bilateral LE's. Exercise was initiated to increase standing tolerance, improve balance, and increase proprioception through JEAN PAUL LE's.     Sitting -  2 sets of blaze pod weight shifting (x5 pods) to initiate use of JEAN PAUL Ue's and LE's. Completed to initiate anterior weight shifting and midline posture while in upright position.   1 set of arc ring semicircle's while seated at mat table. Completed to initiate anterior weight shifting and to improve dynamic balance with forward and lateral leaning.   **Pt completed with moderate verbal and tactile cueing to decrease posterior lean during task. Task initiated to increase dynamic balance and promote upright posture when sitting. Cues provided to place hands in lap and slide hands towards knees to facilitate anterior weight shift.     Exercise:  Seated -   Marches  Heel Slides   Heel/Toe Raises  LAQ  Hip abduction/adduction (Left LE only)   **Patient was guided in 1 set(s) 10 reps of exercises to both lower extremities. Exercises were completed with moderate verbal and visual cueing to initiate proper execution of activity. Pt completed task to improve LE strength necessary to safely transfer out of the chair and to ambulate. Exercises were completed for increased independence with functional mobility.      Functional Outcome Measures:  Not completed  Modified Fourmile Scale:  Not Applicable    ASSESSMENT:  Assessment: Patient progressing toward established goals. Today pt was seen for therapeutic interventions to improve functional mobility and increase independence during functional tasks. Pt has demonstrated improvements in mobility, as shown by her ability to transfer

## 2024-08-05 NOTE — PROGRESS NOTES
RECREATIONAL THERAPY  [facility]      Date:  8/5/2024            Patient Name: Deloris Watts           MRN: [id number]  Acct: [id number]          YOB: 1955 (68 y.o.)       Gender: female   Diagnosis: closed fracture of neck of right femur, initial encounter  Physician: Referring Practitioner: Jax Whatley MD    REASON FOR MISSED TREATMENT:  Pt resting in bed and going to dialysis soon- present     Elena Tan, CTRS    8/5/2024

## 2024-08-05 NOTE — PROGRESS NOTES
[facility]  INPATIENT SPEECH THERAPY  [facility]  DAILY NOTE    TIME   SLP Individual Minutes  Time In: 0900  Time Out: 30  Minutes: 30  Timed Code Treatment Minutes: 15 Minutes  Cognitive tx: 15 minutes   Dysphagia tx: 15 minutes     Date: 2024  Patient Name: Deloris Watts      CSN: [id number]   : 1955  (68 y.o.)  Gender: female   Referring Physician:  Jax Whatley MD   Diagnosis: Closed fracture of neck of right femur, initial encounter   Precautions: Fall Risk, Aspiration Risk  Current Diet: Puree diet, thin liquids   Respiratory Status: Room Air  Swallowing Strategies: Full Upright Position, Small Bite/Sip, Medications Whole with Puree, Alternate Solids and Liquids, Limit Distractions, and Monitor for Fatigue   Date of Last MBS/FEES: 2024    Pain:  No pain reported.    Subjective:  Patient sitting upright in wheelchair upon ST arrival; alert, pleasant, and cooperative this date with significantly improved participation and engagement. Patient's , Tenzin, and Virtual  present at bedside.      Short-Term Goals:  SHORT TERM GOAL #1:  Goal 1: Patient will consume conservative puree diet (advanced textures as indicated) and thin liquids with use of compensatory strategies and no overt s/s of aspiration or pulmonary decline to maintain adequate nutrition adn hydration measures  INTERVENTIONS:  Patient completed skilled dietary analysis with snack consisting of: pudding and crushed yajaira cracker (minced and moist texture). Patient with independent oral initiation this date. Fair mastication, though prolonged time needed to complete, fair bolus control/formation, and AP transit of minced and moist textures consumed with minimal oral residuals to follow; cleared with ST instructed liquid wash. No overt s/s of airway invasion documented this date, though certainly cannot r/o airway invasion without formal instrumentation.

## 2024-08-05 NOTE — PROGRESS NOTES
[facility]  Occupational Therapy  Daily Note  Time:   Time In: 930  Time Out: 1030  Timed Code Treatment Minutes: 60 Minutes  Minutes: 60          Date: 2024  Patient Name: Deloris Watts,   Gender: female      Room: [facility]03/003-A  MRN: [id number]  : 1955  (68 y.o.)  Referring Practitioner: Jax Whatley MD  Diagnosis: closed fracture of neck of right femur, initial encounter  Additional Pertinent Hx: Deloris is a 68 year old female whom has been in/out of hospital for quite some time with varying medical conditions.  [facility] in the home environment in which resulting in pain in LE with imaging indicating an acute fracture in the R femoral neck with diffuse osteopenia.  Pt. underwent open treatment to R femoral neck fracture with prothesis on 24 and is WBAT. Pt. admitted back to the [facility] on 24 for further medical care and referred to skilled OT services at this time.  See physicans H&P for additional information.    Restrictions/Precautions:  Restrictions/Precautions: Fall Risk, General Precautions, Weight Bearing, Surgical protocol  Right Lower Extremity Weight Bearing: Weight Bearing As Tolerated  Position Activity Restriction  Hip Precautions: No hip flexion > 90 degrees, No hip internal rotation, No hip external rotation, No ADduction     Social/Functional History:  Lives With: Spouse  Type of Home: House  Home Layout: One level, Able to Live on Main level with bedroom/bathroom, Performs ADL's on one level  Home Access: Ramped entrance (spouse assists pt up/down ramp with transport chair)  Home Equipment: Walker - Rolling, Walker - 4-Wheeled, Wheelchair - Manual, Lift chair, Reacher (transport chair)   Bathroom Shower/Tub: Tub/Shower unit  Bathroom Toilet: Handicap height  Bathroom Equipment: Grab bars in shower, Shower chair  Bathroom Accessibility: Accessible    Receives Help  From: Family  ADL Assistance: Needs assistance  Homemaking Assistance: Needs assistance  Ambulation Assistance: Needs assistance  Transfer Assistance: Needs assistance    Active : No  Patient's  Info: spouse performs transportation needs     Additional Comments: Pt's spouse assists with ADLs/IADLs, transfers and mobility tasks.    SUBJECTIVE: Up in w/c upon arrival, encouragement given for session due to patient being tired and wanting to go back to bed,  present at times during session    PAIN: 8/10: R hip, ice applied    Vitals: Vitals not assessed per clinical judgement, see nursing flowsheet    COGNITION: Slow Processing, Decreased Recall, Decreased Insight, Impaired Memory, Inattention, Decreased Problem Solving, Decreased Safety Awareness, Impaired Attention, Difficulty Following Commands, and Impulsive    ADL:   Grooming: Contact Guard Assistance.  Stood at sink to brush teeth, not very thorough with task, brushed hair sitting in w/c .    IADL:   Not Tested    BALANCE:  Sitting Balance:  Stand By Assistance. At edge of chair, dynamically reaching for puzzle pieces at table, able to reach with 1 UE at a time and holding on to arm rest with opposite hand  Standing Balance: Contact Guard Assistance. X 3 minutes while brushing teeth with 1 UE support, minimal VC for keeping knees straight    BED MOBILITY:  Sit to Supine: Moderate Assistance A for B LE, HOB flat    TRANSFERS:  Sit to Stand:  Contact Guard Assistance. W/c  Stand to Sit: Contact Guard Assistance.      FUNCTIONAL MOBILITY:  Assistive Device: Rolling Walker  Assist Level:  Minimal Assistance.   Distance: w/c to EOB, approx 5 feet   Impulsive and reaching for bed prematurely    Propelled w/c with B UE approx 25 feet x 2 with SBA and encouragement     ADDITIONAL ACTIVITIES:  Guided patient through BUE AROM this date x10 reps x1 set in w/c in order to increase BUE strength and improve activity tolerance for ADLs and homemaking tasks.

## 2024-08-06 LAB
ANION GAP SERPL CALC-SCNC: 15 MEQ/L (ref 8–16)
BASOPHILS ABSOLUTE: 0.1 THOU/MM3 (ref 0–0.1)
BASOPHILS NFR BLD AUTO: 0.7 %
BUN SERPL-MCNC: 41 MG/DL (ref 7–22)
CALCIUM SERPL-MCNC: 9.1 MG/DL (ref 8.5–10.5)
CHLORIDE SERPL-SCNC: 94 MEQ/L (ref 98–111)
CO2 SERPL-SCNC: 27 MEQ/L (ref 23–33)
CREAT SERPL-MCNC: 3.5 MG/DL (ref 0.4–1.2)
DEPRECATED RDW RBC AUTO: 61.6 FL (ref 35–45)
EOSINOPHIL NFR BLD AUTO: 4.2 %
EOSINOPHILS ABSOLUTE: 0.6 THOU/MM3 (ref 0–0.4)
ERYTHROCYTE [DISTWIDTH] IN BLOOD BY AUTOMATED COUNT: 17 % (ref 11.5–14.5)
GFR SERPL CREATININE-BSD FRML MDRD: 13 ML/MIN/1.73M2
GLUCOSE SERPL-MCNC: 89 MG/DL (ref 70–108)
HCT VFR BLD AUTO: 36.7 % (ref 37–47)
HGB BLD-MCNC: 11.5 GM/DL (ref 12–16)
IMM GRANULOCYTES # BLD AUTO: 1.04 THOU/MM3 (ref 0–0.07)
IMM GRANULOCYTES NFR BLD AUTO: 7.5 %
LYMPHOCYTES ABSOLUTE: 2.8 THOU/MM3 (ref 1–4.8)
LYMPHOCYTES NFR BLD AUTO: 20.3 %
MCH RBC QN AUTO: 30.6 PG (ref 26–33)
MCHC RBC AUTO-ENTMCNC: 31.3 GM/DL (ref 32.2–35.5)
MCV RBC AUTO: 97.6 FL (ref 81–99)
MONOCYTES ABSOLUTE: 1 THOU/MM3 (ref 0.4–1.3)
MONOCYTES NFR BLD AUTO: 7.4 %
NEUTROPHILS ABSOLUTE: 8.3 THOU/MM3 (ref 1.8–7.7)
NEUTROPHILS NFR BLD AUTO: 59.9 %
NRBC BLD AUTO-RTO: 0 /100 WBC
PLATELET # BLD AUTO: 644 THOU/MM3 (ref 130–400)
PLATELET BLD QL SMEAR: ABNORMAL
PMV BLD AUTO: 9.7 FL (ref 9.4–12.4)
POTASSIUM SERPL-SCNC: 4 MEQ/L (ref 3.5–5.2)
PROCALCITONIN SERPL IA-MCNC: 1.05 NG/ML (ref 0.01–0.09)
RBC # BLD AUTO: 3.76 MILL/MM3 (ref 4.2–5.4)
SCAN OF BLOOD SMEAR: NORMAL
SODIUM SERPL-SCNC: 136 MEQ/L (ref 135–145)
WBC # BLD AUTO: 13.9 THOU/MM3 (ref 4.8–10.8)

## 2024-08-06 PROCEDURE — 99232 SBSQ HOSP IP/OBS MODERATE 35: CPT | Performed by: PHYSICAL MEDICINE & REHABILITATION

## 2024-08-06 PROCEDURE — 6360000002 HC RX W HCPCS: Performed by: PHYSICAL MEDICINE & REHABILITATION

## 2024-08-06 PROCEDURE — 97530 THERAPEUTIC ACTIVITIES: CPT

## 2024-08-06 PROCEDURE — 97116 GAIT TRAINING THERAPY: CPT

## 2024-08-06 PROCEDURE — 6370000000 HC RX 637 (ALT 250 FOR IP): Performed by: PHYSICAL MEDICINE & REHABILITATION

## 2024-08-06 PROCEDURE — 97110 THERAPEUTIC EXERCISES: CPT

## 2024-08-06 PROCEDURE — 85025 COMPLETE CBC W/AUTO DIFF WBC: CPT

## 2024-08-06 PROCEDURE — 80048 BASIC METABOLIC PNL TOTAL CA: CPT

## 2024-08-06 PROCEDURE — 36415 COLL VENOUS BLD VENIPUNCTURE: CPT

## 2024-08-06 PROCEDURE — 92526 ORAL FUNCTION THERAPY: CPT

## 2024-08-06 PROCEDURE — 97535 SELF CARE MNGMENT TRAINING: CPT

## 2024-08-06 PROCEDURE — 84145 PROCALCITONIN (PCT): CPT

## 2024-08-06 PROCEDURE — 97129 THER IVNTJ 1ST 15 MIN: CPT

## 2024-08-06 PROCEDURE — 99232 SBSQ HOSP IP/OBS MODERATE 35: CPT | Performed by: INTERNAL MEDICINE

## 2024-08-06 PROCEDURE — 1180000000 HC REHAB R&B

## 2024-08-06 RX ADMIN — PRIMIDONE 50 MG: 50 TABLET ORAL at 08:36

## 2024-08-06 RX ADMIN — HEPARIN SODIUM 5000 UNITS: 5000 INJECTION INTRAVENOUS; SUBCUTANEOUS at 08:37

## 2024-08-06 RX ADMIN — BUPROPION HYDROCHLORIDE 150 MG: 150 TABLET, EXTENDED RELEASE ORAL at 08:38

## 2024-08-06 RX ADMIN — DOCUSATE SODIUM 100 MG: 50 LIQUID ORAL at 20:53

## 2024-08-06 RX ADMIN — FLUTICASONE PROPIONATE 2 SPRAY: 50 SPRAY, METERED NASAL at 08:37

## 2024-08-06 RX ADMIN — SERTRALINE 50 MG: 50 TABLET, FILM COATED ORAL at 08:36

## 2024-08-06 RX ADMIN — CARBIDOPA AND LEVODOPA 1 TABLET: 10; 100 TABLET ORAL at 05:56

## 2024-08-06 RX ADMIN — POLYETHYLENE GLYCOL 3350 17 G: 17 POWDER, FOR SOLUTION ORAL at 08:37

## 2024-08-06 RX ADMIN — SENNOSIDES 17.2 MG: 8.6 TABLET, FILM COATED ORAL at 20:53

## 2024-08-06 RX ADMIN — FOLIC ACID 500 MCG: 1 TABLET ORAL at 08:38

## 2024-08-06 RX ADMIN — MEGESTROL ACETATE 200 MG: 400 SUSPENSION ORAL at 20:52

## 2024-08-06 RX ADMIN — ACETAMINOPHEN 650 MG: 325 TABLET ORAL at 05:56

## 2024-08-06 RX ADMIN — CLOPIDOGREL BISULFATE 75 MG: 75 TABLET ORAL at 08:36

## 2024-08-06 RX ADMIN — ACETAMINOPHEN 650 MG: 325 TABLET ORAL at 17:10

## 2024-08-06 RX ADMIN — HEPARIN SODIUM 5000 UNITS: 5000 INJECTION INTRAVENOUS; SUBCUTANEOUS at 20:54

## 2024-08-06 RX ADMIN — BUPROPION HYDROCHLORIDE 300 MG: 300 TABLET, EXTENDED RELEASE ORAL at 08:36

## 2024-08-06 RX ADMIN — TRAZODONE HYDROCHLORIDE 100 MG: 100 TABLET ORAL at 20:54

## 2024-08-06 RX ADMIN — MEGESTROL ACETATE 200 MG: 400 SUSPENSION ORAL at 08:37

## 2024-08-06 RX ADMIN — ACETAMINOPHEN 650 MG: 325 TABLET ORAL at 11:43

## 2024-08-06 RX ADMIN — AMLODIPINE BESYLATE 10 MG: 10 TABLET ORAL at 08:35

## 2024-08-06 RX ADMIN — OXYCODONE 5 MG: 5 TABLET ORAL at 06:25

## 2024-08-06 RX ADMIN — DOCUSATE SODIUM 100 MG: 50 LIQUID ORAL at 08:37

## 2024-08-06 RX ADMIN — CARBIDOPA AND LEVODOPA 1 TABLET: 10; 100 TABLET ORAL at 17:08

## 2024-08-06 RX ADMIN — CARBIDOPA AND LEVODOPA 1 TABLET: 10; 100 TABLET ORAL at 11:43

## 2024-08-06 ASSESSMENT — ENCOUNTER SYMPTOMS
VOMITING: 0
DIARRHEA: 0
SHORTNESS OF BREATH: 0
COUGH: 0
NAUSEA: 0
CONSTIPATION: 0
ABDOMINAL PAIN: 0
BACK PAIN: 0
WHEEZING: 0
RHINORRHEA: 0

## 2024-08-06 ASSESSMENT — PAIN SCALES - GENERAL: PAINLEVEL_OUTOF10: 0

## 2024-08-06 NOTE — PROGRESS NOTES
[facility] Nurse  Progress Note       Deloris Watts  AGE: 68 y.o.   GENDER: female  : 1955  UNIT: 8K-03/003-A  TODAY'S DATE:  2024  ADMISSION DATE: 2024  6:02 PM    Subjective   Reason for C Evaluation and Assessment:     DTI on left buttock         Deloris Watts is a 68 y.o. female referred by:   [] Physician/PA/APRN  [x] Nursing  [] Other:     Wound Identification:  Wound Type: bruise  Wound Location: left buttock  Modifying factors: fall    Objective     Mert Risk Score: Mert Scale Score: 16      Assessment     Encounter: Present to pt room for re-eval of  \"DTPI left buttock\". Pt in chair. Primary RN and tech assisted patient to bed. Assist pt to roll to right side for assessment. Pt had depends in place.  Bruising to left buttock and sacral area has resolved. See photo below. Staff to continue to monitor. Pillow placed under left side. Bed in low, call light in reach.    24    Left buttock and sacrum    24    Left buttock and sacral bruising POA      Plan     Treatment Recommendations:   Sacral and left buttock bruising: monitor. Apply zinc paste to ortiz anal area for protection d/t fecal incontinence.    Specialty Bed Required :   [x] Low Air Loss   [x] Pressure Redistribution  [] Fluid Immersion- Dolphin  [] Bariatric  [] RotoProne   [] Other:     Discharge Plan:  Placement for patient upon discharge: unknown  [] Home  [] Inpatient Rehab  [] SNF  [] ECF  [] [facility]ie/ LTAC  Patient appropriate for Outpatient Wound Care Center: no

## 2024-08-06 NOTE — PROGRESS NOTES
[facility]  Recreational Therapy  Daily Note  [facility]    Time Spent with Patient: 0 minutes    Date:  8/6/2024       Patient Name: Deloris Watts      MRN: [id number]      YOB: 1955 (68 y.o.)       Gender: female  Diagnosis: closed fracture of neck of right femur, initial encounter  Referring Practitioner: Jax Whatley MD    RESTRICTIONS/PRECAUTIONS:  Restrictions/Precautions: Fall Risk, General Precautions, Weight Bearing, Surgical protocol     Hearing: Exceptions to WFL  Hearing Exceptions: Hard of hearing/hearing concerns    PAIN: 0-no c/o pain     SUBJECTIVE:  pt looked with a blank stare and did not respond    OBJECTIVE:  Pt was standing with PT and RT asked her if she would like to get her hair washed and styled by our beautician tomorrow but pt stared with a blank look and did not respond- and PT stated she has been talking about getting her hair done frequently- agreed to put her on the list for tomorrow         Patient Education  New Education Provided: Importance of Leisure,     Electronically signed by: ARA RodriguezS  Date: 8/6/2024

## 2024-08-06 NOTE — PROGRESS NOTES
Patient: Deloris Watts  Unit/Bed: 8K-03/003-A  YOB: 1955  MRN: [id number] Acct: [id number]   Admitting Diagnosis: Right femoral fracture ([facility]) [S72.91XA]  Closed fracture of neck of right femur, initial encounter ([facility]) [S72.001A]  Admit Date:  7/30/2024  Hospital Day: 7    Assessment:     Principal Problem:    Closed fracture of neck of right femur, initial encounter ([facility])  Active Problems:    Anemia due to chronic kidney disease, on chronic dialysis ([facility])    Depression    Anxiety disorder    History of CVA (cerebrovascular accident)    Allergic rhinitis    Essential hypertension    Chronic kidney disease with in-center hemodialysis preferred by patient    Age related osteoporosis    ESRD (end stage renal disease) ([facility])    Physical deconditioning    Impaired cognition    Parkinsonism ([facility])    S/P hip hemiarthroplasty    Oropharyngeal dysphagia  Resolved Problems:    * No resolved hospital problems. *      Plan:     The procalcitonin and WBC are better.        Subjective:     Patient has no complaint of CP or SOB..   Medication side effects: none    Scheduled Meds:   senna  2 tablet Oral Nightly    polyethylene glycol  17 g Oral Daily    megestrol  200 mg Oral BID    docusate  100 mg Oral BID    oxyCODONE  5 mg Oral Q24H    carbidopa-levodopa  1 tablet Oral TID    traZODone  100 mg Oral Nightly    buPROPion  150 mg Oral QAM    buPROPion  300 mg Oral Daily    fluticasone  2 spray Each Nostril Daily    folic acid  500 mcg Oral Daily    primidone  50 mg Oral Daily    [Held by provider] sevelamer  800 mg Oral BID with meals    sertraline  50 mg Oral Daily    amLODIPine  10 mg Oral Daily    clopidogrel  75 mg Oral Daily    acetaminophen  650 mg Oral Q6H    heparin (porcine)  5,000 Units SubCUTAneous BID     Continuous Infusions:   sodium chloride       PRN Meds:linaclotide, sodium chloride flush, sodium chloride, acetaminophen, ondansetron, oxyCODONE **OR** oxyCODONE, bisacodyl, polyethylene glycol,

## 2024-08-06 NOTE — PROGRESS NOTES
[facility]  Occupational Therapy  Daily Note  Time:    Time In: 1330  Time Out: 1430  Timed Code Treatment Minutes: 60 Minutes  Minutes: 60          Date: 2024  Patient Name: Deloris Watts,   Gender: female      Room: [facility]03/003-A  MRN: [id number]  : 1955  (68 y.o.)  Referring Practitioner: Jax Whatley MD  Diagnosis: closed fracture of neck of right femur, initial encounter  Additional Pertinent Hx: Deloris is a 68 year old female whom has been in/out of hospital for quite some time with varying medical conditions.  [facility] environment in which resulting in pain in LE with imaging indicating an acute fracture in the R femoral neck with diffuse osteopenia.  Pt. underwent open treatment to R femoral neck fracture with prothesis on 24 and is WBAT. Pt. admitted back to the [facility] on 24 for further medical care and referred to skilled OT services at this time.  See physicans H&P for additional information.    Restrictions/Precautions:  Restrictions/Precautions: Fall Risk, General Precautions, Weight Bearing, Surgical protocol  Right Lower Extremity Weight Bearing: Weight Bearing As Tolerated  Position Activity Restriction  Hip Precautions: No hip flexion > 90 degrees, No hip internal rotation, No hip external rotation, No ADduction      Social/Functional History:  Lives With: Spouse  Type of Home: House  Home Layout: One level, Able to Live on Main level with bedroom/bathroom, Performs ADL's on one level  Home Access: Ramped entrance (spouse assists pt up/down ramp with transport chair)  Home Equipment: Walker - Rolling, Walker - 4-Wheeled, Wheelchair - Manual, Lift chair, Reacher (transport chair)   Bathroom Shower/Tub: Tub/Shower unit  Bathroom Toilet: Handicap height  Bathroom Equipment: Grab bars in shower, Shower chair  Bathroom Accessibility: Accessible    Receives Help  performance with ADLs.  Short Term Goal 3: Pt. to follow 2 step grooming task with SBA with no more than 3 cues for cognition to advance performance with self cares.  Short Term Goal 4: Pt. to attend to self care routine up to 5 minutes before a cue required to redirect back to task to improve performance with ADLs.  Short Term Goal 5: Pt. to complete self feed with SBA with min cues for technique to improve overall intake.  Long Term Goals  Time Frame for Long Term Goals : 2 weeks from OT eval on IPR  Long Term Goal 1: Pt. to demo improved performance with daily occupations as evidenced by a score of 25/100 on the Modified Barthel Index.    Following session, patient left in safe position with all fall risk precautions in place.

## 2024-08-06 NOTE — PROGRESS NOTES
[facility]  INPATIENT SPEECH THERAPY  [facility]  DAILY NOTE    TIME   SLP Individual Minutes  Time In: 0830  Time Out: 0900  Minutes: 30  Timed Code Treatment Minutes: 12 Minutes  Cognitive tx: 12 minutes   Dysphagia tx: 18 minutes     Date: 2024  Patient Name: Deloris Watts      CSN: [id number]   : 1955  (68 y.o.)  Gender: female   Referring Physician:  Jax Whatley MD   Diagnosis: Closed fracture of neck of right femur, initial encounter   Precautions: Fall Risk, Aspiration Risk  Current Diet: Puree diet, thin liquids   Respiratory Status: Room Air  Swallowing Strategies: Full Upright Position, Small Bite/Sip, Medications Whole with Puree, Alternate Solids and Liquids, Limit Distractions, and Monitor for Fatigue   Date of Last MBS/FEES: 2024    Pain:  No pain reported.    Subjective:  Patient sitting upright in wheelchair upon ST arrival; alert, pleasant, and cooperative this date with significantly improved participation and engagement. Patient's , Tenzin, and Virtual  present at bedside. RNNicole, present with administration of morning medications during ST session. Patient with report of feeling nauseous with request to return to bed, however, agreeable to maintain position in wheelchair for duration of ST session. Nursing assistant informed patient with request to transfer to bed with verbal receptiveness noted.    Short-Term Goals:  SHORT TERM GOAL #1:  Goal 1: Patient will consume conservative puree diet (advanced textures as indicated) and thin liquids with use of compensatory strategies and no overt s/s of aspiration or pulmonary decline to maintain adequate nutrition adn hydration measures  INTERVENTIONS:  Patient completed skilled dietary analysis with minced and moist texture consisting of scrambled eggs, magic cup, oatmeal, and thin liquids via straw, as well as medications crushed in puree. Patient with independent
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
## 2024-08-06 NOTE — PLAN OF CARE
breakdown   Assess vascular access sites hourly  Taken 8/6/2024 0830  Skin Integrity Remains Intact: Monitor for areas of redness and/or skin breakdown     Problem: Musculoskeletal - Adult  Goal: Return mobility to safest level of function  8/6/2024 1248 by Kaya Nick RN  Outcome: Progressing  Flowsheets (Taken 8/6/2024 0830)  Return Mobility to Safest Level of Function:   Assess patient stability and activity tolerance for standing, transferring and ambulating with or without assistive devices   Assist with transfers and ambulation using safe patient handling equipment as needed     Problem: Gastrointestinal - Adult  Goal: Maintains adequate nutritional intake  8/6/2024 1248 by Kaya Nick RN  Outcome: Progressing  Flowsheets (Taken 8/6/2024 0830)  Maintains adequate nutritional intake:   Monitor percentage of each meal consumed   Identify factors contributing to decreased intake, treat as appropriate     Problem: Infection - Adult  Goal: Absence of infection at discharge  8/6/2024 1248 by Kaya Nick RN  Outcome: Progressing  Flowsheets (Taken 8/6/2024 0830)  Absence of infection at discharge:   Assess and monitor for signs and symptoms of infection   Monitor lab/diagnostic results   Monitor all insertion sites i.e., indwelling lines, tubes and drains     Problem: Infection - Adult  Goal: Absence of infection during hospitalization  8/6/2024 1248 by Kaya Nick RN  Outcome: Progressing  Flowsheets (Taken 8/6/2024 0830)  Absence of infection during hospitalization:   Assess and monitor for signs and symptoms of infection   Monitor lab/diagnostic results   Monitor all insertion sites i.e., indwelling lines, tubes and drains

## 2024-08-06 NOTE — PROGRESS NOTES
Physical Medicine & Rehabilitation Progress Note    Chief Complaint:  Right hip femoral neck fracture due to fall requiring right hemiarthroplasty surgery     Subjective:    Deloris Watts is a 68 y.o. right-handed slim  female with history of hypertension, hyperlipidemia, hydronephrosis, fibromyalgia, irritable bowel syndrome, essential tremor, osteoporosis, end-stage renal disease on hemodialysis (MWF) since January 2023, stroke with left side weakness in 1990s, anemia, parkinsonism, osteoporosis, depression, anxiety, right wrist and right ankle fracture treated conservatively, right proximal humeral fracture due to fall requiring ORIF, status post right carpal tunnel release surgery, status post cervical spine surgery, status post hysterectomy and bilateral oophorectomy, hemorrhoidectomy, sinus surgery, tubal ligation, L2 compression fracture requiring kyphoplasty, LASEK surgery, left femur neck displaced fracture due to fall on 3/10/2024 requiring left hip hemiarthroplasty on 3/11/2024, was admitted on 7/30/2024 for intensive inpatient management of impairment & disability secondary to right hip femur neck fracture due to fall requiring right hemiarthroplasty surgery.     The patient was known to inpatient rehab service.  She was previously admitted to inpatient rehab service on 5/29/2024 for small acute left parafalcine subdural hematoma and cerebral concussion secondary to fall accident on 5/25/2024.  She was discharged home on 6/15/2024 with referral for home care service.     The patient recently was hospitalized from 7/8/2024 to 7/11/2024 for headache.  At that time MRI of brain was performed on 7/9/2024 and showed no acute infarction but old right frontal lobe infarct.     The patient says she was going to bathroom at that time with her walker on 7/25/2024.  She left the walker outside the bathroom and went in without the walker.  Her  was in the bedroom at that time.  The patient  requiring left hip hemiarthroplasty surgery  Parkinsonism  Essential hypertension  Oropharyngeal dysphagia  Irritable bowel syndrome  Hyperlipidemia  Osteoporosis  Essential tremor  GERD  Cognitive impairment  History of hydronephrosis  History of fibromyalgia  History of depression and anxiety  History of right proximal humerus fracture requiring ORIF  History of right wrist fracture requiring casting  History of right ankle fracture requiring casting     The patient's vital signs and condition remain stable.  She continues having fatigue with generalized weakness.  Her right lower extremity remains significantly weak limited by the pain.  She continues having some pain at right hip area.  She is still receiving scheduled morning oxycodone 5 mg for pain control.  Otherwise she is not getting any further oxycodone for the rest of the day.  She is scheduled to have hemodialysis today.  The patient has been tolerating the intensive rehabilitation treatment program.  Her function is improving very slowly and fluctuating.    The patient currently is projected to be ready for discharge on 8/9/2024 with plan to transition to SNF subacute rehab program for further rehabilitation.      Plan:  Continue intensive PT/OT/SLP/RT inpatient rehabilitation program at least 3 hours per day, 5 days per week in order to improve functional status prior to discharge.  Family education and training will be completed.  Equipment evaluations and recommendations will be completed as appropriate.       Rehabilitation nursing continue to be involved for bowel, bladder, skin, and pain management.  Nursing will also provide education and training to patient and family.    Prophylaxis:  DVT: Subcutaneous heparin, ACE stocking, intermittent pneumatic compression device.  GI: Colace, Senokot, GlycoLax as needed, Dulcolax suppository as needed, Zofran as needed, Imodium as needed.  Pain: Tylenol, Tylenol as needed, oxycodone 5 to 10 mg as needed;

## 2024-08-06 NOTE — PROGRESS NOTES
[facility]  INPATIENT PHYSICAL THERAPY  DAILY NOTE  [facility] - 8K-03/003-A      Discharge Recommendations: Continue to assess pending progress and Patient would benefit from continued PT at discharge  Equipment Recommendations:Equipment Needed: No    Time In: 30  Time Out: 08  Timed Code Treatment Minutes: 60 Minutes  Minutes: 60          Date: 2024  Patient Name: Deloris Watts,  Gender:  female        MRN: [id number]  : 1955  (68 y.o.)     Referring Practitioner: Jax Whatley MD  Diagnosis: Closed fracture of neck of right demu, initial encounter ([facility])  Additional Pertinent Hx: Per EMR: \" Deloris is a 68 year old female whom has been in/out of hospital for quite some time with varying medical conditions.  Most recently pt re-addmitted back to the hosital due to a fall in the home environment in which resulting in pain in LE with imaging indicating an acute fracture in the R femoral neck with diffuse osteopenia.  Pt. underwent open treatment to R femoral neck fracture with prothesis on 24 and is WBAT with hip precautions in place. See physicans H&P for additional information.\"     Prior Level of Function:  Lives With: Spouse  Type of Home: House  Home Layout: One level, Able to Live on Main level with bedroom/bathroom, Performs ADL's on one level  Home Access: Ramped entrance (spouse assists pt up/down ramp with transport chair)  Home Equipment: Walker - Rolling, Walker - 4-Wheeled, Wheelchair - Manual, Lift chair, Reacher (transport chair)   Bathroom Shower/Tub: Tub/Shower unit  Bathroom Toilet: Handicap height  Bathroom Equipment: Grab bars in shower, Shower chair  Bathroom Accessibility: Accessible    Receives Help From: Family  ADL Assistance: Needs assistance  Homemaking Assistance: Needs assistance  Ambulation Assistance: Needs assistance  Transfer Assistance: Needs assistance  Active : No  Additional Comments: Pt's spouse assists  Term Goal 5: PT to assess ambulation as able.  Long Term Goals  Time Frame for Long Term Goals : 3 weeks  Long Term Goal 1: Pt will perform supine <> sitting EOB with modA for ability to get in and out of bed  Long Term Goal 2: Pt will perform rolling L <> R without bed rails and with IND to reposition in bed  Long Term Goal 3: Pt will perform bed <> chair transfers with use of RW and Celine to transfer to various surfaces  Long Term Goal 4: Pt will perform car transfer with modA for ability to get in and out of the car.  Long Term Goal 5: Pt will tolerate standing > 5min to RW or other support to demo improved tolerance to WB through RLE.    Following session, patient left in safe position with all fall risk precautions in place.

## 2024-08-06 NOTE — PLAN OF CARE
Problem: Discharge Planning  Goal: Discharge to home or other facility with appropriate resources  8/6/2024 1322 by Kusum Guajardo LISW  Note: VARSHA spoke with Vonda at the [facility]. Planning for admission on Friday, 8/9 after dialysis. Vonda is working with patient's , Tenzin, on transportation to and from dialysis.     VARSHA and Dr Whatley met with patient and Tenzin to discuss discharge planning. Patient was educated on the recommendation for SNF and care needs. Patient reported understanding and is in agreement with discharge on Friday, 8/9 to the [facility]. Tenzin asked for PT to practice car transfers with patient due to concerns on having transportation in place for dialysis. SW to notify PT. No outstanding needs at this time.    VARSHA notified [facility] of patient resuming outpatient dialysis on Monday, 8/12.     SW will follow and maintain involvement in discharge planning.

## 2024-08-06 NOTE — PLAN OF CARE
Problem: Discharge Planning  Goal: Discharge to home or other facility with appropriate resources  8/5/2024 2309 by Josephine Ribeiro RN  Outcome: Progressing  Flowsheets (Taken 8/5/2024 2309)  Discharge to home or other facility with appropriate resources: Identify barriers to discharge with patient and caregiver     Problem: Safety - Adult  Goal: Free from fall injury  Outcome: Progressing  Flowsheets (Taken 8/5/2024 2309)  Free From Fall Injury: Instruct family/caregiver on patient safety  Note: Patient has remained free of physical injury during this shift. Safe environment provided, call light within reach, and hourly rounding completed.      Problem: Pain  Goal: Verbalizes/displays adequate comfort level or baseline comfort level  Outcome: Progressing  Flowsheets (Taken 8/5/2024 2309)  Verbalizes/displays adequate comfort level or baseline comfort level:   Encourage patient to monitor pain and request assistance   Administer analgesics based on type and severity of pain and evaluate response   Assess pain using appropriate pain scale   Implement non-pharmacological measures as appropriate and evaluate response     Problem: ABCDS Injury Assessment  Goal: Absence of physical injury  Outcome: Progressing  Flowsheets (Taken 8/5/2024 2309)  Absence of Physical Injury: Implement safety measures based on patient assessment  Note: Patient has remained free of physical injury during this shift. Safe environment provided, call light within reach, and hourly rounding completed.      Problem: Chronic Conditions and Co-morbidities  Goal: Patient's chronic conditions and co-morbidity symptoms are monitored and maintained or improved  Outcome: Progressing  Flowsheets (Taken 8/5/2024 2309)  Care Plan - Patient's Chronic Conditions and Co-Morbidity Symptoms are Monitored and Maintained or Improved: Monitor and assess patient's chronic conditions and comorbid symptoms for stability, deterioration, or improvement   Care plan  reviewed with patient.  Patient  verbalize understanding of the plan of care and contribute to goal setting.

## 2024-08-06 NOTE — PROGRESS NOTES
[facility]  INPATIENT PHYSICAL THERAPY  DAILY NOTE  [facility] - 8K-03/003-A      Discharge Recommendations: Continue to assess pending progress and Patient would benefit from continued PT at discharge  Equipment Recommendations:Equipment Needed: No    Time In: 1056  Time Out: 1126  Timed Code Treatment Minutes: 30 Minutes  Minutes: 30          Date: 2024  Patient Name: Deloris Watts,  Gender:  female        MRN: [id number]  : 1955  (68 y.o.)     Referring Practitioner: Jax Whatley MD  Diagnosis: Closed fracture of neck of right demu, initial encounter ([facility])  Additional Pertinent Hx: Per EMR: \" Deloris is a 68 year old female whom has been in/out of hospital for quite some time with varying medical conditions.  Most recently pt re-addmitted back to the hosital due to a fall in the home environment in which resulting in pain in LE with imaging indicating an acute fracture in the R femoral neck with diffuse osteopenia.  Pt. underwent open treatment to R femoral neck fracture with prothesis on 24 and is WBAT with hip precautions in place. See physicans H&P for additional information.\"     Prior Level of Function:  Lives With: Spouse  Type of Home: House  Home Layout: One level, Able to Live on Main level with bedroom/bathroom, Performs ADL's on one level  Home Access: Ramped entrance (spouse assists pt up/down ramp with transport chair)  Home Equipment: Walker - Rolling, Walker - 4-Wheeled, Wheelchair - Manual, Lift chair, Reacher (transport chair)   Bathroom Shower/Tub: Tub/Shower unit  Bathroom Toilet: Handicap height  Bathroom Equipment: Grab bars in shower, Shower chair  Bathroom Accessibility: Accessible    Receives Help From: Family  ADL Assistance: Needs assistance  Homemaking Assistance: Needs assistance  Ambulation Assistance: Needs assistance  Transfer Assistance: Needs assistance  Active : No  Additional Comments: Pt's spouse assists  with ADLs/IADLs, transfers and mobility tasks.    Restrictions/Precautions:  Restrictions/Precautions: Fall Risk, General Precautions, Weight Bearing, Surgical protocol  Right Lower Extremity Weight Bearing: Weight Bearing As Tolerated  Position Activity Restriction  Hip Precautions: No hip flexion > 90 degrees, No hip internal rotation, No hip external rotation, No ADduction     SUBJECTIVE: Patient supine in bed upon PTA arrival. Patient agreeable to therapy session. Patient reporting overall increased fatigue and request to return to bed at end of therapy session.     PAIN: Patient did not state    Vitals: Vitals not assessed per clinical judgement, see nursing flowsheet    OBJECTIVE:  Bed Mobility:  Rolling to Left: Minimal Assistance, with head of bed flat, with rail, with verbal cues , with increased time for completion   Supine to Sit: Minimal Assistance, with head of bed flat, with rail, with verbal cues , with increased time for completion  Sit to Supine: Minimal Assistance, with head of bed flat, with rail, with verbal cues , with increased time for completion   - cues provided throughout for proper techniques with sequencing. Patient requires increased time to complete tasks with decreased assistance.    Transfers:  Sit to Stand: Minimal Assistance, Moderate Assistance, with Anastasiya Stedy, with increased time for completion, cues for hand placement, Patient requires Flako to complete sit to stand transfers from EOB and ModA from toilet.   Stand to Sit:Minimal Assistance, with Anastasiya Stedy, with increased time for completion, cues for hand placement, cues for eccentric control.  EOB to/from Toilet: Dependent, with Anastasiya Stedy, cues for hand placement, with verbal cues, cues throughout for upright posture for safe transfers.  - Patient requires verbal cues for upright posture with static standing in Anastasiya Stedy. Patient with flat affect and requires continued verbal and tactile cues for safe positioning in Anastasiya

## 2024-08-06 NOTE — DISCHARGE INSTR - COC
Continuity of Care Form    Patient Name: Deloris Watts   :  1955  MRN:  [id number]    Admit date:  2024  Discharge date:  2024    Code Status Order: DNR-CCA   Advance Directives:     Admitting Physician:  Jax Whatley MD  PCP: Johana Weldon MD    Discharging Nurse: FLY Blanchard  Discharging Hospital Unit/Room#: 8K-03/003-A  Discharging Unit Phone Number: [phone]    Emergency Contact:   Extended Emergency Contact Information  Primary Emergency Contact: Tenzin Watts  Address: [address] [place] [id number] [facility]  Home Phone: [phone]  Mobile Phone: [phone]  Relation: Spouse   needed? No  Secondary Emergency Contact: Layla Rose   [facility]  Home Phone: [phone]  Mobile Phone: [phone]  Relation: Child   needed? No    Past Surgical History:  Past Surgical History:   Procedure Laterality Date    CARPAL TUNNEL RELEASE Right     in     COLONOSCOPY      [facility]    CT BIOPSY RENAL  2022    CT BIOPSY RENAL 2022 [facility] CT SCAN    CYSTOSCOPY Left 2023    CYSTOSCOPY, LEFT URETERAL STENT PLACEMENT performed by Edilson Whalen MD at [facility] OR    ENDOSCOPY, COLON, DIAGNOSTIC  unsure    HEMORRHOID SURGERY  unsure    HIP SURGERY Left 2024    Left hemiarthroplasty performed by Virgilio Lezama DO at [facility] OR    HIP SURGERY Right 2024    RIGHT HIP HEMIARTHROPLASTY performed by Ebenezer Mantilla MD at [facility] OR    HUMERUS FRACTURE SURGERY Right     ORIF    HYSTERECTOMY (CERVIX STATUS UNKNOWN)      age 38    LASIK      lasik    NECK SURGERY  18  years ago    cervical spine fusion ; in     OVARY REMOVAL Bilateral     age 38    SINUS SURGERY  10 years ago    SPINE SURGERY N/A 2023    KYPHOPLASTY L2 performed by Nimisha Garza MD at [facility] OR    TUBAL LIGATION      URETER SURGERY N/A 2023    CYSTOSCOPY, LEFT  URETEROSCOPY, LASER LITHOTRIPSIE OF STONES performed by Edilson Whalen  07/30/24 2021   Wound Etiology Skin Tear 08/06/24 0830   Dressing/Treatment Open to air 08/06/24 0830   Wound Assessment Pink/red 08/06/24 0830   Drainage Amount None (dry) 08/06/24 0830   Odor None 08/06/24 0830   Juleitte-wound Assessment Ecchymosis 08/06/24 0830   Number of days: 6       Incision 07/26/24 Hip Right;Lateral (Active)   Wound Image   08/04/24 1036   Dressing Status Clean;Dry;Intact 08/06/24 0830   Dressing/Treatment Other (comment);ABD pad 08/06/24 0830   Closure Surgical glue 08/06/24 0830   Margins Approximated 08/04/24 1036   Incision Assessment Other (Comment) 08/06/24 0830   Drainage Amount None (dry) 08/06/24 0830   Drainage Description Thin;Serosanguinous 08/03/24 1110   Odor None 08/06/24 0830   Number of days: 11        Elimination:  Continence:   Bowel: Yes  Bladder: Incontinent of urine at times  Urinary Catheter: None   Colostomy/Ileostomy/Ileal Conduit: No       Date of Last BM: 8/8/2024    Intake/Output Summary (Last 24 hours) at 8/6/2024 1316  Last data filed at 8/6/2024 1020  Gross per 24 hour   Intake 640 ml   Output 1400 ml   Net -760 ml     I/O last 3 completed shifts:  In: 640 [P.O.:240]  Out: 1400     Safety Concerns:     History of Falls (last 30 days)    Impairments/Disabilities:      Cognition     Nutrition Therapy:  Current Nutrition Therapy:   - Oral Diet:  Dysphagia - Minced and Moist    Routes of Feeding: Oral  Liquids: Thin Liquids  Daily Fluid Restriction: no  Last Modified Barium Swallow with Video (Video Swallowing Test): 7/30/2024    Treatments at the Time of Hospital Discharge:   Respiratory Treatments: None    Oxygen Therapy:  is not on home oxygen therapy.  Ventilator:    - No ventilator support    Rehab Therapies: Physical Therapy, Occupational Therapy, and Speech/Language Therapy  Weight Bearing Status/Restrictions: No weight bearing restrictions  Other Medical Equipment (for information only, NOT a DME order):  Anastasiya high   Other Treatments: none      Patient's

## 2024-08-06 NOTE — PROGRESS NOTES
[facility]   Nephrology progress note  8/6/2024, 11:43 AM      Pt Name:    Deloris Watts  MRN:     [id number]     YOB: 1955  Admit Date:    7/30/2024  6:02 PM    Chief Complaint: Nephrology following for ESRD    Subjective:  Patient seen and examined  Feels okay no complaints  No cp or SOB .mental status looks good    Objective:  24HR INTAKE/OUTPUT:    Intake/Output Summary (Last 24 hours) at 8/6/2024 1143  Last data filed at 8/6/2024 1020  Gross per 24 hour   Intake 640 ml   Output 1400 ml   Net -760 ml      Admission weight: 49.1 kg (108 lb 3.9 oz)  Wt Readings from Last 3 Encounters:   08/06/24 54 kg (119 lb 0.8 oz)   07/29/24 48.7 kg (107 lb 5.8 oz)   07/10/24 46.6 kg (102 lb 11.8 oz)        Vitals :   Vitals:    08/06/24 0541 08/06/24 0625 08/06/24 0830 08/06/24 1114   BP:   (!) 118/52    Pulse:   (!) 108 89   Resp:  17 18    Temp:   97.9 °F (36.6 °C)    TempSrc:   Oral    SpO2:   95%    Weight: 54 kg (119 lb 0.8 oz)      Height:           Physical examination  General Appearance:  appears comfortable, no distress  Neck: No JVD noted  Lungs clear  Heart: S1-S2  Psych: Not agitated  CNS grossly intact    Medications:  Infusion:    sodium chloride       Meds:    senna  2 tablet Oral Nightly    polyethylene glycol  17 g Oral Daily    megestrol  200 mg Oral BID    docusate  100 mg Oral BID    oxyCODONE  5 mg Oral Q24H    carbidopa-levodopa  1 tablet Oral TID    traZODone  100 mg Oral Nightly    buPROPion  150 mg Oral QAM    buPROPion  300 mg Oral Daily    fluticasone  2 spray Each Nostril Daily    folic acid  500 mcg Oral Daily    primidone  50 mg Oral Daily    [Held by provider] sevelamer  800 mg Oral BID with meals    sertraline  50 mg Oral Daily    amLODIPine  10 mg Oral Daily    clopidogrel  75 mg Oral Daily    acetaminophen  650 mg Oral Q6H    heparin (porcine)  5,000 Units SubCUTAneous BID     Lab Data :  CBC:   Recent Labs     08/05/24  0615 08/06/24  0700   WBC 15.2*

## 2024-08-07 LAB
ANION GAP SERPL CALC-SCNC: 18 MEQ/L (ref 8–16)
BUN SERPL-MCNC: 59 MG/DL (ref 7–22)
CALCIUM SERPL-MCNC: 9.1 MG/DL (ref 8.5–10.5)
CHLORIDE SERPL-SCNC: 92 MEQ/L (ref 98–111)
CO2 SERPL-SCNC: 22 MEQ/L (ref 23–33)
CREAT SERPL-MCNC: 4.6 MG/DL (ref 0.4–1.2)
GFR SERPL CREATININE-BSD FRML MDRD: 10 ML/MIN/1.73M2
GLUCOSE SERPL-MCNC: 87 MG/DL (ref 70–108)
POTASSIUM SERPL-SCNC: 4.5 MEQ/L (ref 3.5–5.2)
SODIUM SERPL-SCNC: 132 MEQ/L (ref 135–145)

## 2024-08-07 PROCEDURE — 97530 THERAPEUTIC ACTIVITIES: CPT

## 2024-08-07 PROCEDURE — 92526 ORAL FUNCTION THERAPY: CPT

## 2024-08-07 PROCEDURE — 6360000002 HC RX W HCPCS: Performed by: PHYSICAL MEDICINE & REHABILITATION

## 2024-08-07 PROCEDURE — 1180000000 HC REHAB R&B

## 2024-08-07 PROCEDURE — 97110 THERAPEUTIC EXERCISES: CPT

## 2024-08-07 PROCEDURE — 80048 BASIC METABOLIC PNL TOTAL CA: CPT

## 2024-08-07 PROCEDURE — 36415 COLL VENOUS BLD VENIPUNCTURE: CPT

## 2024-08-07 PROCEDURE — 6370000000 HC RX 637 (ALT 250 FOR IP): Performed by: PHYSICAL MEDICINE & REHABILITATION

## 2024-08-07 PROCEDURE — 97129 THER IVNTJ 1ST 15 MIN: CPT

## 2024-08-07 PROCEDURE — 90935 HEMODIALYSIS ONE EVALUATION: CPT

## 2024-08-07 PROCEDURE — 99232 SBSQ HOSP IP/OBS MODERATE 35: CPT | Performed by: PHYSICAL MEDICINE & REHABILITATION

## 2024-08-07 PROCEDURE — 97535 SELF CARE MNGMENT TRAINING: CPT

## 2024-08-07 PROCEDURE — 99232 SBSQ HOSP IP/OBS MODERATE 35: CPT | Performed by: INTERNAL MEDICINE

## 2024-08-07 RX ORDER — HEPARIN SODIUM 5000 [USP'U]/ML
5000 INJECTION, SOLUTION INTRAVENOUS; SUBCUTANEOUS EVERY 8 HOURS SCHEDULED
Status: DISCONTINUED | OUTPATIENT
Start: 2024-08-07 | End: 2024-08-09 | Stop reason: HOSPADM

## 2024-08-07 RX ORDER — MEGESTROL ACETATE 40 MG/ML
200 SUSPENSION ORAL
Status: DISCONTINUED | OUTPATIENT
Start: 2024-08-07 | End: 2024-08-09 | Stop reason: HOSPADM

## 2024-08-07 RX ADMIN — CLOPIDOGREL BISULFATE 75 MG: 75 TABLET ORAL at 08:35

## 2024-08-07 RX ADMIN — BUPROPION HYDROCHLORIDE 300 MG: 300 TABLET, EXTENDED RELEASE ORAL at 08:37

## 2024-08-07 RX ADMIN — TRAZODONE HYDROCHLORIDE 100 MG: 100 TABLET ORAL at 20:46

## 2024-08-07 RX ADMIN — HEPARIN SODIUM 5000 UNITS: 5000 INJECTION INTRAVENOUS; SUBCUTANEOUS at 23:33

## 2024-08-07 RX ADMIN — ACETAMINOPHEN 650 MG: 325 TABLET ORAL at 05:52

## 2024-08-07 RX ADMIN — OXYCODONE 5 MG: 5 TABLET ORAL at 05:51

## 2024-08-07 RX ADMIN — HEPARIN SODIUM 5000 UNITS: 5000 INJECTION INTRAVENOUS; SUBCUTANEOUS at 08:36

## 2024-08-07 RX ADMIN — PRIMIDONE 50 MG: 50 TABLET ORAL at 08:35

## 2024-08-07 RX ADMIN — SERTRALINE 50 MG: 50 TABLET, FILM COATED ORAL at 08:35

## 2024-08-07 RX ADMIN — BUPROPION HYDROCHLORIDE 150 MG: 150 TABLET, EXTENDED RELEASE ORAL at 08:37

## 2024-08-07 RX ADMIN — MEGESTROL ACETATE 200 MG: 400 SUSPENSION ORAL at 08:35

## 2024-08-07 RX ADMIN — ONDANSETRON 4 MG: 4 TABLET, ORALLY DISINTEGRATING ORAL at 20:56

## 2024-08-07 RX ADMIN — DOCUSATE SODIUM 100 MG: 50 LIQUID ORAL at 20:45

## 2024-08-07 RX ADMIN — DOCUSATE SODIUM 100 MG: 50 LIQUID ORAL at 08:35

## 2024-08-07 RX ADMIN — AMLODIPINE BESYLATE 10 MG: 10 TABLET ORAL at 08:35

## 2024-08-07 RX ADMIN — HEPARIN SODIUM 5000 UNITS: 5000 INJECTION INTRAVENOUS; SUBCUTANEOUS at 17:34

## 2024-08-07 RX ADMIN — CARBIDOPA AND LEVODOPA 1 TABLET: 10; 100 TABLET ORAL at 17:32

## 2024-08-07 RX ADMIN — FOLIC ACID 500 MCG: 1 TABLET ORAL at 08:35

## 2024-08-07 RX ADMIN — FLUTICASONE PROPIONATE 2 SPRAY: 50 SPRAY, METERED NASAL at 08:36

## 2024-08-07 RX ADMIN — ACETAMINOPHEN 650 MG: 325 TABLET ORAL at 23:32

## 2024-08-07 RX ADMIN — POLYETHYLENE GLYCOL 3350 17 G: 17 POWDER, FOR SOLUTION ORAL at 08:36

## 2024-08-07 RX ADMIN — ACETAMINOPHEN 650 MG: 325 TABLET ORAL at 14:16

## 2024-08-07 RX ADMIN — CARBIDOPA AND LEVODOPA 1 TABLET: 10; 100 TABLET ORAL at 05:52

## 2024-08-07 RX ADMIN — SENNOSIDES 17.2 MG: 8.6 TABLET, FILM COATED ORAL at 20:45

## 2024-08-07 ASSESSMENT — PAIN SCALES - GENERAL
PAINLEVEL_OUTOF10: 0
PAINLEVEL_OUTOF10: 3
PAINLEVEL_OUTOF10: 0

## 2024-08-07 ASSESSMENT — ENCOUNTER SYMPTOMS
DIARRHEA: 0
RHINORRHEA: 0
SHORTNESS OF BREATH: 0
NAUSEA: 0
ABDOMINAL PAIN: 0
BACK PAIN: 0
CONSTIPATION: 0
VOMITING: 0
COUGH: 0
WHEEZING: 0

## 2024-08-07 NOTE — PROGRESS NOTES
Patient educated on how to use incentive spirometer. Patient does not verbalize understanding and does not demonstrate proper use. Emphasized importance and usage of device, with coughing and deep breathing every 4 hours while awake.

## 2024-08-07 NOTE — PROGRESS NOTES
[facility]  INPATIENT PHYSICAL THERAPY  Progress NOTE  [facility] - 8K-03/003-A      Discharge Recommendations: Subacte/Skilled Nursing Facility  Equipment Recommendations:Equipment Needed: No    Time In: 0700  Time Out: 0830  Timed Code Treatment Minutes: 90 Minutes  Minutes: 90          Date: 2024  Patient Name: Deloris Watts,  Gender:  female        MRN: [id number]  : 1955  (68 y.o.)     Referring Practitioner: Jax Whatley MD  Diagnosis: Closed fracture of neck of right demu, initial encounter ([facility])  Additional Pertinent Hx: Per EMR: \" Deloris is a 68 year old female whom has been in/out of hospital for quite some time with varying medical conditions.  Most recently pt re-addmitted back to the hosital due to a fall in the home environment in which resulting in pain in LE with imaging indicating an acute fracture in the R femoral neck with diffuse osteopenia.  Pt. underwent open treatment to R femoral neck fracture with prothesis on 24 and is WBAT with hip precautions in place. See physicans H&P for additional information.\"     Prior Level of Function:  Lives With: Spouse  Type of Home: House  Home Layout: One level, Able to Live on Main level with bedroom/bathroom, Performs ADL's on one level  Home Access: Ramped entrance (spouse assists pt up/down ramp with transport chair)  Home Equipment: Walker - Rolling, Walker - 4-Wheeled, Wheelchair - Manual, Lift chair, Reacher (transport chair)   Bathroom Shower/Tub: Tub/Shower unit  Bathroom Toilet: Handicap height  Bathroom Equipment: Grab bars in shower, Shower chair  Bathroom Accessibility: Accessible    Receives Help From: Family  ADL Assistance: Needs assistance  Homemaking Assistance: Needs assistance  Ambulation Assistance: Needs assistance  Transfer Assistance: Needs assistance  Active : No  Additional Comments: Pt's spouse assists with ADLs/IADLs, transfers and mobility  participated in hands-on training, no device used,  provided appropriate cues throughout, therapist assisted with w/c placement and occasional cues    **used lionel stedy to/from bathroom for safety    Ambulation:  Not Tested    Stairs:  Not Tested    Balance:  Dynamic Sitting Balance: Supervision, sitting in w/c, reaching slightly outside and within DENNIS, used back rest, fair midline sitting orientation ~18min  Static Standing Balance: Minimal Assistance, Moderate Assistance, X 1, standing in lionel stedy during clothing management, BUE support, moderate posterior lean, cues for forward weight shift and erect posture    Exercise:  Patient was guided in 1 set(s) 15 reps of exercises to both lower extremities: ,Seated marches, Seated heel/toe raises, Long arc quads, and Seated isometric hip adduction.  Exercises were completed for increased independence with functional mobility.    Functional Outcome Measures:  Not completed  Modified [place] Scale:  Not Applicable    ASSESSMENT:  Assessment: Patient progressing toward established goals.  PT ASSESSMENT:  Mrs Watts met 4/5 STG's and 1/5 LTG's.  Patient is making good progress towards goals over the last week, progressing bed mobility from dependent x2/max assist x1 to CGA/min assist x1, sit < > stand from max assist x1 to min/mod/max assist x1 and has been able to initiate gait in the parallel bars, a few steps with min assist.  Patient continues to be limited by pain in her right hip at times.  Deloris has been more alert and able to follow instruction to complete functional mobility tasks with decreased difficulty this week.  Patient will benefit from continued skilled PT services to continue to improve her ability to complete functional mobility, reduce her risk for falls and improve pt's quality of life.  Activity Tolerance:  Patient tolerance of  treatment:Good.  Plan: Current Treatment Recommendations: Strengthening, Balance training, Functional mobility

## 2024-08-07 NOTE — FLOWSHEET NOTE
Stable 2.5 hour TX completed. Removed 1 l of fluid, tolerated fluid removal well. Bilateral cath ports flushed with normal saline, clamped, and secured with tegos. CVC drsg clean, dry, and intact. Report called to primary RN.   08/07/24 1220 08/07/24 1517   Vital Signs   BP (!) 136/57 (!) 151/74   Temp 99.3 °F (37.4 °C) 98.6 °F (37 °C)   Pulse 94 91   Respirations 19 19   SpO2 100 % 100 %   Weight - Scale 50 kg (110 lb 3.7 oz) 49 kg (108 lb 0.4 oz)   Weight Method Bed scale Bed scale   Percent Weight Change  --  -2   Post-Hemodialysis Assessment   Post-Treatment Procedures  --  Blood returned;Catheter Capped, clamped with Saline x2 ports   Machine Disinfection Process  --  Acid/Vinegar Clean;Heat Disinfect;Exterior Machine Disinfection   Blood Volume Processed (Liters)  --  59.6 L   Dialyzer Clearance  --  Lightly streaked   Duration of Treatment (minutes)  --  150 minutes   Heparin Amount Administered During Treatment (mL)  --  0 mL   Hemodialysis Intake (ml)  --  400 ml   Hemodialysis Output (ml)  --  1400 ml   NET Removed (ml)  --  1000

## 2024-08-07 NOTE — PROGRESS NOTES
[facility]  Recreational Therapy  Daily Note  [facility]    Time Spent with Patient: 40 minutes    Date:  8/7/2024       Patient Name: Deloris Watts      MRN: [id number]      YOB: 1955 (68 y.o.)       Gender: female  Diagnosis: closed fracture of neck of right femur, initial encounter  Referring Practitioner: Jax Whatley MD    RESTRICTIONS/PRECAUTIONS:  Restrictions/Precautions: Fall Risk, General Precautions, Weight Bearing, Surgical protocol     Hearing: Exceptions to WFL  Hearing Exceptions: Hard of hearing/hearing concerns    PAIN: 0    SUBJECTIVE:  Let's try it     OBJECTIVE:  Pt came to the beauty shop this am via w/c to get her hair washed and styled by our beautician-she had a coughing spell with ST but agreed to get her hair done-she took her emesis basin and wash cloth just incase but did not have anymore trouble-affect brightened while getting her hair styled-she did not initiate any conversation but was so glad she got it done-appreciative -she also enjoyed our pet therapy dogs coming in to visit this am-affect bright when she saw they were dressed up today- also present and enjoyed the dogs        Patient Education  New Education Provided: Importance of Leisure,     Electronically signed by: Elena Tan, CTRS  Date: 8/7/2024

## 2024-08-07 NOTE — PROGRESS NOTES
[facility]  INPATIENT REHABILITATION  TEAM CONFERENCE NOTE    Conference Date: 2024  Admit Date:  2024  6:02 PM  Patient Name: Deloris Watts    MRN: [id number]    : 1955  (68 y.o.)  Rehabilitation Admitting Diagnosis:  Right femoral fracture ([facility]) [S72.91XA]  Closed fracture of neck of right femur, initial encounter ([facility]) [S72.001A]  Referring Practitioner: Jax Whatley MD      CASE MANAGEMENT  Current issues/needs regarding patient and family discharge status: Patient continues to be motivated and progressing with therapy. Patient plans to be discharged on Friday,  to the [facility]. SW will follow and maintain involvement in discharge planning.     PHYSICAL THERAPY  Mrs Watts met 4/5 STG's and 1/5 LTG's.  Patient is making good progress towards goals over the last week, progressing bed mobility from dependent x2/max assist x1 to CGA/min assist x1, sit < > stand from max assist x1 to min/mod/max assist x1 and has been able to initiate gait in the parallel bars, a few steps with min assist.  Patient continues to be limited by pain in her right hip at times.  Deloris has been more alert and able to follow instruction to complete functional mobility tasks with decreased difficulty this week.  Patient will benefit from continued skilled PT services to continue to improve her ability to complete functional mobility, reduce her risk for falls and improve pt's quality of life.  Equipment Needed: No    SPEECH THERAPY  Patient achieved 3/5 STGs (1/5 STGs discontinued) and 0/2 LTGs this therapy reporting period. Patient with demonstrated gains in orientation (improvement to 25/30), BASIC safety problem solving, and BASIC sequencing with minimal assistance. Patient continues to present with deficits in independence with cognitive tasks and carryover of functional information. Patient expressive and receptive language WFL for basic communicative interactions. Patient speech and  voice WFL, though reduced vocal intensity noted. Patient currently safely consuming minced and moist diet with thin liquids. Overt s/s of airway invasion documented with medication pass with concerns for airway invasion; will continue to recommend medications crushed in puree, one, small bite at a time, with extensive wait time between bites to assist with safety. Concerns remaining for safety with ADL/IADL completion, with motivation and cognitive impairment serving as barriers to making further progress across cognitive domains. Anticipate further progress with ongoing, intensive, skilled ST services via IPR setting. WithOUT ongoing, skilled ST services, suspect patient may demonstrate difficulty making successful return to PLOF. Patient would benefit from skilled ST services to address aforementioned skills with recommendations for SNF at discharge, no return to driving, and 24/7 supervision.    OCCUPATIONAL THERAPY  Deloris, is making slow progress towards therapy goals as patient has fluctuated from session to session. Yesterday, patient required assist of lionel steady for transfers with maxA and was able to complete transfers and short distance mobility using RW requiring modA-Celine. Deloris requires maxA for toileting tasks requiring max-modA for upright positioning in lionel stedy while additional person assist with ortiz care. Patient was able to complete dynamic standing balance tolerating >1 minute intervals due to decreased activity tolerance and pain. She continues to be dependent with LB dressing and footwear management and modA for UB dressing.   Pt. demo increased difficulty with follow commands/demands of task, reduced safey/insight and overall reduced cognition, balance, activity tolerance, and pain. Without continued OT services pt is at risk of falls, further decline in functioning and increased dependency on others.  Other: defer at this time    RECREATIONAL THERAPY  Patient has been offered participation

## 2024-08-07 NOTE — PLAN OF CARE
Problem: Discharge Planning  Goal: Discharge to home or other facility with appropriate resources  8/7/2024 1401 by Kusum Guajardo LISW  Note: VARSHA spoke with Tonia at [facility] on this date. Transport scheduled for Friday, 8/9 at 1 PM.    VARSHA spoke with Vonda at the [facility]. SW updated her on transport time.    SW will follow and maintain involvement in discharge planning.

## 2024-08-07 NOTE — PROGRESS NOTES
[facility]  Inpatient Rehabilitation  Occupational Therapy  Progress Note  Time:  Time In: 930  Time Out: 1030  Timed Code Treatment Minutes: 60 Minutes  Minutes: 60          Date: 2024  Patient Name: Deloris Watts,   Gender: female      Room: -03/003-A  MRN: [id number]  : 1955  (68 y.o.)  Referring Practitioner: Jax Whatley MD  Diagnosis: closed fracture of neck of right femur, initial encounter  Additional Pertinent Hx: Deloris is a 68 year old female whom has been in/out of hospital for quite some time with varying medical conditions.  Most recently pt re-addmitted back to the [facility] due to a fall in the home environment in which resulting in pain in LE with imaging indicating an acute fracture in the R femoral neck with diffuse osteopenia.  Pt. underwent open treatment to R femoral neck fracture with prothesis on 24 and is WBAT. Pt. admitted back to the [facility] on 24 for further medical care and referred to skilled OT services at this time.  See physicans H&P for additional information.    Restrictions/Precautions:  Restrictions/Precautions: Fall Risk, General Precautions, Weight Bearing, Surgical protocol  Right Lower Extremity Weight Bearing: Weight Bearing As Tolerated  Position Activity Restriction  Hip Precautions: No hip flexion > 90 degrees, No hip internal rotation, No hip external rotation, No ADduction    Social/Functional History:  Lives With: Spouse  Type of Home: House  Home Layout: One level, Able to Live on Main level with bedroom/bathroom, Performs ADL's on one level  Home Access: Ramped entrance (spouse assists pt up/down ramp with transport chair)  Home Equipment: Walker - Rolling, Walker - 4-Wheeled, Wheelchair - Manual, Lift chair, Reacher (transport chair)   Bathroom Shower/Tub: Tub/Shower unit  Bathroom Toilet: Handicap height  Bathroom Equipment: Grab bars in shower, Shower chair  Bathroom Accessibility: Accessible    Receives Help From: Family  ADL  ACTIVITIES:  Patient completed BUE A/AROM exercises x10 reps x1 set in all joints/planes seated in w/c. Required rest breaks throughout. Completed to increase and maintain joint integrity/ROM required for ADLs          Modified Gilliam Scale:  Not Applicable    ASSESSMENT:  Activity Tolerance:  Patient tolerance of  treatment: Fair treatment tolerance, Limited by pain, Limited by fatigue, Reduced activity pace, and Need for increased rest breaks      Assessment: Deloris, is making slow progress towards therapy goals as patient has fluctuated from session to session. Yesterday, patient required assist of lionel steady for transfers with maxA and was able to complete transfers and short distance mobility using RW requiring modA-Celine. Deloris requires maxA for toileting tasks requiring max-modA for upright positioning in lionel stedy while additional person assist with ortiz care. Patient was able to complete dynamic standing balance tolerating >1 minute intervals due to decreased activity tolerance and pain. She continues to be dependent with LB dressing and footwear management and modA for UB dressing.   Pt. demo increased difficulty with follow commands/demands of task, reduced safey/insight and overall reduced cognition, balance, activity tolerance, and pain. Without continued OT services pt is at risk of falls, further decline in functioning and increased dependency on others.  Discharge Recommendations: Subacute/Skilled Nursing Facility, 24 hour supervision or assist  Equipment Recommendations: Other: defer at this time  Plan: Times Per Week: 5x/wk for 60 minutes  Current Treatment Recommendations: Strengthening, Balance training, Functional mobility training, Endurance training, Cognitive reorientation, Safety education & training, Patient/Caregiver education & training, Equipment evaluation, education, & procurement, Self-Care / ADL, Positioning, Gait training    Education:  Learners: Patient  Safety with transfers and

## 2024-08-07 NOTE — PLAN OF CARE
Problem: Discharge Planning  Goal: Discharge to home or other facility with appropriate resources  8/7/2024 1325 by Kaya Nick RN  Outcome: Progressing  Flowsheets (Taken 8/7/2024 0830)  Discharge to home or other facility with appropriate resources:   Identify barriers to discharge with patient and caregiver   Identify discharge learning needs (meds, wound care, etc)     Problem: Safety - Adult  Goal: Free from fall injury  8/7/2024 1325 by Kaya Nick RN  Outcome: Progressing  Falling star prevention in place. Bed and chair alarms in use. Call light in reach. Purposeful hourly rounding.    Problem: Pain  Goal: Verbalizes/displays adequate comfort level or baseline comfort level  8/7/2024 1325 by Kaya Nick RN  Outcome: Progressing  Flowsheets (Taken 8/7/2024 0830)  Verbalizes/displays adequate comfort level or baseline comfort level:   Encourage patient to monitor pain and request assistance   Assess pain using appropriate pain scale   Implement non-pharmacological measures as appropriate and evaluate response   Administer analgesics based on type and severity of pain and evaluate response     Problem: Skin/Tissue Integrity  Goal: Absence of new skin breakdown  Description: 1.  Monitor for areas of redness and/or skin breakdown  2.  Assess vascular access sites hourly  3.  Every 4-6 hours minimum:  Change oxygen saturation probe site  4.  Every 4-6 hours:  If on nasal continuous positive airway pressure, respiratory therapy assess nares and determine need for appliance change or resting period.  8/7/2024 1325 by Kaya Nick RN  Outcome: Progressing     Problem: Chronic Conditions and Co-morbidities  Goal: Patient's chronic conditions and co-morbidity symptoms are monitored and maintained or improved  8/7/2024 1325 by Kaya Nick RN  Outcome: Progressing  Flowsheets (Taken 8/7/2024 0830)  Care Plan - Patient's Chronic Conditions and Co-Morbidity Symptoms are

## 2024-08-07 NOTE — PROGRESS NOTES
[facility]   Nephrology progress note  8/7/2024, 1:18 PM      Pt Name:    Deloris Watts  MRN:     [id number]     YOB: 1955  Admit Date:    7/30/2024  6:02 PM    Chief Complaint: Nephrology following for ESRD    Subjective:  Patient seen and examined  Feels okay no complaints  No cp or SOB .mental status looks good    Objective:  24HR INTAKE/OUTPUT:    Intake/Output Summary (Last 24 hours) at 8/7/2024 1318  Last data filed at 8/7/2024 0555  Gross per 24 hour   Intake 370 ml   Output --   Net 370 ml      Admission weight: 49.1 kg (108 lb 3.9 oz)  Wt Readings from Last 3 Encounters:   08/07/24 50 kg (110 lb 3.7 oz)   07/29/24 48.7 kg (107 lb 5.8 oz)   07/10/24 46.6 kg (102 lb 11.8 oz)        Vitals :   Vitals:    08/07/24 0400 08/07/24 0433 08/07/24 0830 08/07/24 1220   BP: (!) 142/63  119/84 (!) 136/57   Pulse: 98  79 94   Resp: 16  17 19   Temp: 99.1 °F (37.3 °C)  97.5 °F (36.4 °C) 99.3 °F (37.4 °C)   TempSrc: Oral  Oral    SpO2: 99%  100% 100%   Weight:  54 kg (119 lb 0.8 oz)  50 kg (110 lb 3.7 oz)   Height:           Physical examination  General Appearance:  appears comfortable, no distress  Neck: No JVD noted  Lungs clear  Heart: S1-S2  Psych: Not agitated  CNS grossly intact    Medications:  Infusion:    sodium chloride       Meds:    megestrol  200 mg Oral TID WC    heparin (porcine)  5,000 Units SubCUTAneous 3 times per day    senna  2 tablet Oral Nightly    polyethylene glycol  17 g Oral Daily    docusate  100 mg Oral BID    oxyCODONE  5 mg Oral Q24H    carbidopa-levodopa  1 tablet Oral TID    traZODone  100 mg Oral Nightly    buPROPion  150 mg Oral QAM    buPROPion  300 mg Oral Daily    fluticasone  2 spray Each Nostril Daily    folic acid  500 mcg Oral Daily    primidone  50 mg Oral Daily    [Held by provider] sevelamer  800 mg Oral BID with meals    sertraline  50 mg Oral Daily    amLODIPine  10 mg Oral Daily    clopidogrel  75 mg Oral Daily    acetaminophen  650 mg

## 2024-08-07 NOTE — PROGRESS NOTES
[facility] [place]  INPATIENT SPEECH THERAPY  [facility]  PROGRESS NOTE    TIME   SLP Individual Minutes  Time In: 0830  Time Out: 0900  Minutes: 30  Timed Code Treatment Minutes: 15 Minutes  Cognitive tx: 15 minutes   Dysphagia tx: 15 minutes     Date: 2024  Patient Name: Deloris Watts      CSN: [id number]   : 1955  (68 y.o.)  Gender: female   Referring Physician:  Jax Whatley MD   Diagnosis: Closed fracture of neck of right femur, initial encounter   Precautions: Fall Risk, Aspiration Risk  Current Diet: Puree diet, thin liquids   Respiratory Status: Room Air  Swallowing Strategies: Full Upright Position, Small Bite/Sip, Medications Whole with Puree, Alternate Solids and Liquids, Limit Distractions, and Monitor for Fatigue   Date of Last MBS/FEES: 2024    Pain:  No pain reported.    Subjective:  Patient sitting upright in wheelchair upon ST arrival; alert, pleasant, and cooperative this date with significantly improved participation and engagement. Patient's , Tenzin, and Virtual  present at bedside. Nicole BILL, present with administration of morning medications during ST session.     Short-Term Goals:  SHORT TERM GOAL #1:  Goal 1: Patient will consume conservative puree diet (advanced textures as indicated) and thin liquids with use of compensatory strategies and no overt s/s of aspiration or pulmonary decline to maintain adequate nutrition adn hydration measures. GOAL MET  REVISED GOAL 2: Patient will consume conservative minced and moist diet (advanced textures as indicated) and thin liquids with use of compensatory strategies and no overt s/s of aspiration or pulmonary decline to maintain adequate nutrition adn hydration measures.   INTERVENTIONS:  Patient completed skilled analysis of medication pass by Nicole BILL. Patient initially with PO consumption of two thin liquid medications with suspected decreased coordination and bolus control

## 2024-08-07 NOTE — PLAN OF CARE
Problem: Discharge Planning  Goal: Discharge to home or other facility with appropriate resources  8/7/2024 0139 by Beverley Hendrickson RN  Outcome: Progressing  Flowsheets (Taken 8/6/2024 2100)  Discharge to home or other facility with appropriate resources: Identify barriers to discharge with patient and caregiver     Problem: Safety - Adult  Goal: Free from fall injury  8/7/2024 0139 by Beverley Hendrickson RN  Outcome: Progressing     Problem: Pain  Goal: Verbalizes/displays adequate comfort level or baseline comfort level  8/7/2024 0139 by Beverley Hendrickson RN  Outcome: Progressing     Problem: Skin/Tissue Integrity  Goal: Absence of new skin breakdown  Description: 1.  Monitor for areas of redness and/or skin breakdown  2.  Assess vascular access sites hourly  3.  Every 4-6 hours minimum:  Change oxygen saturation probe site  4.  Every 4-6 hours:  If on nasal continuous positive airway pressure, respiratory therapy assess nares and determine need for appliance change or resting period.  8/7/2024 0139 by Beverley Hendrickson RN  Outcome: Progressing     Problem: ABCDS Injury Assessment  Goal: Absence of physical injury  8/7/2024 0139 by Beverley Hendrickson RN  Outcome: Progressing     Problem: Chronic Conditions and Co-morbidities  Goal: Patient's chronic conditions and co-morbidity symptoms are monitored and maintained or improved  8/7/2024 0139 by Beverley Hendrickson RN  Outcome: Progressing  Flowsheets (Taken 8/6/2024 2100)  Care Plan - Patient's Chronic Conditions and Co-Morbidity Symptoms are Monitored and Maintained or Improved: Monitor and assess patient's chronic conditions and comorbid symptoms for stability, deterioration, or improvement     Problem: Skin/Tissue Integrity - Adult  Goal: Skin integrity remains intact  8/7/2024 0139 by Beverley Hendrickson RN  Outcome: Progressing  Flowsheets (Taken 8/6/2024 2100)  Skin Integrity Remains Intact: Monitor for areas of redness and/or skin breakdown     Problem: Musculoskeletal -  Adult  Goal: Return mobility to safest level of function  8/7/2024 0139 by Beverley Hendrickson, RN  Outcome: Progressing  Flowsheets (Taken 8/6/2024 2100)  Return Mobility to Safest Level of Function:   Assess patient stability and activity tolerance for standing, transferring and ambulating with or without assistive devices   Assist with transfers and ambulation using safe patient handling equipment as needed     Problem: Infection - Adult  Goal: Absence of infection during hospitalization  8/7/2024 0139 by Beverley Hendrickson, RN  Outcome: Progressing  Flowsheets (Taken 8/6/2024 2100)  Absence of infection during hospitalization:   Assess and monitor for signs and symptoms of infection   Monitor lab/diagnostic results   Administer medications as ordered   Instruct and encourage patient and family to use good hand hygiene technique

## 2024-08-07 NOTE — CARE COORDINATION
Resting this shift.  Denies having pain.  2 assist used for transferring with lionel high.  Needs cueing and direction for safe transfers

## 2024-08-07 NOTE — PROGRESS NOTES
apparently slipped and fell and landed on her right side hitting her right hip.  She immediately feel pain at her right hip and was unable to get up.  She told her  that she did not hit her head.  She did not lose consciousness at the time.  Her  called 911 and the patient was brought to [facility] ER by EMS for evaluation.  X-ray of right hip and pelvis done on 7/25/2024 revealed acute right femoral neck fracture with diffuse osteopenia.  She was admitted.  Orthopedics was consulted on 7/26/2024 and surgical intervention was planned.  Nephrology service was also consulted to continue patient's hemodialysis 3 times weekly on Monday, Wednesday and Friday.  The patient then underwent open treatment of right femoral neck fracture with prosthesis by Dr. Ebenezer Mantilla on 7/26/2024.  She is allowed to have weightbearing as tolerated on right lower extremity postoperatively with total hip arthroplasty protocol.  PM&R was consulted on 7/28/2024 and intensive inpatient rehabilitation treatment was considered.  Speech therapy evaluation and treatment was also ordered to address patient's impaired cognition.  The patient was evaluated by speech therapist on 7/29/2024 and oropharyngeal dysphagia was suspected craniocaudally.  Diet consistency changed to puréed and thin liquid diet was recommended by speech therapist after evaluation.  Modified barium swallowing study was done on 7/30/2024.  Purée and thin liquid diet was continued after modified barium swallowing study was performed.    The patient says she feels well.  She says she had a good sleep last night.  She says she still has some pain at the right hip and right thigh when she move her right lower extremity.  She is still receiving scheduled 1 dose of 5 mg oxycodone in the morning prior to rehab therapy.  She did not ask for additional pain medication afterward for the last 4 days.  She continues endorsing having fatigue, tiredness and  generalized weakness.  She denies chest pain, shortness of breath, numbness or tingling feeling.  She tolerated yesterday's rehab treatment well.    The patient is scheduled to be discharged tomorrow, 8/9/2024 to [facility] nursing [facility] for subacute rehab program.      Rehabilitation:  PT: Reviewed.    Bed Mobility:  Rolling to Left: Stand By Assistance, with head of bed flat, with rail, with verbal cues , with increased time for completion   Rolling to Right: Stand By Assistance, with head of bed flat, with rail, with verbal cues , with increased time for completion   Supine to Sit: Moderate Assistance, with head of bed flat, without rail, with increased time for completion  Sit to Supine: Stand By Assistance, with head of bed flat   Scooting: Minimal Assistance, Moderate Assistance, X 1, to scoot to edge of bed     Transfers:  Sit to Stand: Minimal Assistance, with increased time for completion, cues for hand placement, with verbal cues, posterior lean, cues for weight shift forward  Stand to Sit:Minimal Assistance  *at best CGA to Min A to stand from w/c, in lionel stedy, in parallel bars and from bed, Min to Mod A at worst due to fatigue  Stand Pivot:Maximum Assistance, with increased time for completion, with verbal cues, used RW, bed>w/c, cues for sequencing     To/From Bed and Chair: Minimal Assistance, completed bed>w/c with RW, (lionel stedy required to/from bathroom)     Car:Minimal Assistance, Moderate Assistance, X 1, with increased time for completion, cues for hand placement, with verbal cues, completed 2x trials,  participated in hands-on training     -first trial: Mod A x1, no device,  giving appropriate cues for foot placement and hand placement     -second trial: Min to Mod A x1, used RW, less cues required to complete     Ambulation:  Contact Guard Assistance, Minimal Assistance, X 1 x1 and w/c follow of another  Distance: CGA parallel bars 6ft x2,      RW 17ft and 30ft

## 2024-08-08 LAB
ANION GAP SERPL CALC-SCNC: 15 MEQ/L (ref 8–16)
BASOPHILS ABSOLUTE: 0.3 THOU/MM3 (ref 0–0.1)
BASOPHILS NFR BLD AUTO: 2.2 %
BUN SERPL-MCNC: 26 MG/DL (ref 7–22)
CALCIUM SERPL-MCNC: 8.9 MG/DL (ref 8.5–10.5)
CHLORIDE SERPL-SCNC: 96 MEQ/L (ref 98–111)
CO2 SERPL-SCNC: 25 MEQ/L (ref 23–33)
CREAT SERPL-MCNC: 3.1 MG/DL (ref 0.4–1.2)
DEPRECATED RDW RBC AUTO: 60.9 FL (ref 35–45)
ELLIPTOCYTES: ABNORMAL
EOSINOPHIL NFR BLD AUTO: 4.5 %
EOSINOPHILS ABSOLUTE: 0.6 THOU/MM3 (ref 0–0.4)
ERYTHROCYTE [DISTWIDTH] IN BLOOD BY AUTOMATED COUNT: 16.9 % (ref 11.5–14.5)
GFR SERPL CREATININE-BSD FRML MDRD: 16 ML/MIN/1.73M2
GLUCOSE SERPL-MCNC: 76 MG/DL (ref 70–108)
HCT VFR BLD AUTO: 33 % (ref 37–47)
HGB BLD-MCNC: 10.3 GM/DL (ref 12–16)
IMM GRANULOCYTES # BLD AUTO: 0.75 THOU/MM3 (ref 0–0.07)
IMM GRANULOCYTES NFR BLD AUTO: 5.4 %
LYMPHOCYTES ABSOLUTE: 3.5 THOU/MM3 (ref 1–4.8)
LYMPHOCYTES NFR BLD AUTO: 24.9 %
MCH RBC QN AUTO: 31.1 PG (ref 26–33)
MCHC RBC AUTO-ENTMCNC: 31.2 GM/DL (ref 32.2–35.5)
MCV RBC AUTO: 99.7 FL (ref 81–99)
MONOCYTES ABSOLUTE: 1.2 THOU/MM3 (ref 0.4–1.3)
MONOCYTES NFR BLD AUTO: 8.3 %
NEUTROPHILS ABSOLUTE: 7.6 THOU/MM3 (ref 1.8–7.7)
NEUTROPHILS NFR BLD AUTO: 54.7 %
NRBC BLD AUTO-RTO: 0 /100 WBC
PLATELET # BLD AUTO: 644 THOU/MM3 (ref 130–400)
PLATELET BLD QL SMEAR: ABNORMAL
PMV BLD AUTO: 10 FL (ref 9.4–12.4)
POLYCHROMASIA BLD QL SMEAR: ABNORMAL
POTASSIUM SERPL-SCNC: 4.3 MEQ/L (ref 3.5–5.2)
RBC # BLD AUTO: 3.31 MILL/MM3 (ref 4.2–5.4)
SCAN OF BLOOD SMEAR: NORMAL
SODIUM SERPL-SCNC: 136 MEQ/L (ref 135–145)
TOXIC GRANULES BLD QL SMEAR: PRESENT
WBC # BLD AUTO: 13.9 THOU/MM3 (ref 4.8–10.8)

## 2024-08-08 PROCEDURE — 85025 COMPLETE CBC W/AUTO DIFF WBC: CPT

## 2024-08-08 PROCEDURE — 99232 SBSQ HOSP IP/OBS MODERATE 35: CPT | Performed by: INTERNAL MEDICINE

## 2024-08-08 PROCEDURE — 80048 BASIC METABOLIC PNL TOTAL CA: CPT

## 2024-08-08 PROCEDURE — 97116 GAIT TRAINING THERAPY: CPT

## 2024-08-08 PROCEDURE — 6360000002 HC RX W HCPCS: Performed by: PHYSICAL MEDICINE & REHABILITATION

## 2024-08-08 PROCEDURE — 97110 THERAPEUTIC EXERCISES: CPT

## 2024-08-08 PROCEDURE — 1180000000 HC REHAB R&B

## 2024-08-08 PROCEDURE — 99232 SBSQ HOSP IP/OBS MODERATE 35: CPT | Performed by: PHYSICAL MEDICINE & REHABILITATION

## 2024-08-08 PROCEDURE — 97530 THERAPEUTIC ACTIVITIES: CPT

## 2024-08-08 PROCEDURE — 92507 TX SP LANG VOICE COMM INDIV: CPT | Performed by: SPEECH-LANGUAGE PATHOLOGIST

## 2024-08-08 PROCEDURE — 92526 ORAL FUNCTION THERAPY: CPT | Performed by: SPEECH-LANGUAGE PATHOLOGIST

## 2024-08-08 PROCEDURE — 6370000000 HC RX 637 (ALT 250 FOR IP): Performed by: PHYSICAL MEDICINE & REHABILITATION

## 2024-08-08 PROCEDURE — 97535 SELF CARE MNGMENT TRAINING: CPT

## 2024-08-08 PROCEDURE — 36415 COLL VENOUS BLD VENIPUNCTURE: CPT

## 2024-08-08 RX ORDER — MEGESTROL ACETATE 40 MG/ML
200 SUSPENSION ORAL
Qty: 240 ML | Refills: 3 | DISCHARGE
Start: 2024-08-09

## 2024-08-08 RX ORDER — OXYCODONE HYDROCHLORIDE 5 MG/1
5 TABLET ORAL
Qty: 7 TABLET | Refills: 0 | Status: SHIPPED | DISCHARGE
Start: 2024-08-08 | End: 2024-08-15

## 2024-08-08 RX ORDER — OXYCODONE HYDROCHLORIDE 5 MG/1
2.5 TABLET ORAL EVERY 4 HOURS PRN
Status: DISCONTINUED | OUTPATIENT
Start: 2024-08-08 | End: 2024-08-09 | Stop reason: HOSPADM

## 2024-08-08 RX ORDER — OXYCODONE HYDROCHLORIDE 5 MG/1
5 TABLET ORAL EVERY 4 HOURS PRN
Status: DISCONTINUED | OUTPATIENT
Start: 2024-08-08 | End: 2024-08-09 | Stop reason: HOSPADM

## 2024-08-08 RX ORDER — FOLIC ACID 1 MG/1
500 TABLET ORAL DAILY
Qty: 30 TABLET | Refills: 3 | DISCHARGE
Start: 2024-08-09

## 2024-08-08 RX ORDER — POLYETHYLENE GLYCOL 3350 17 G/17G
17 POWDER, FOR SOLUTION ORAL DAILY
Qty: 527 G | Refills: 1 | DISCHARGE
Start: 2024-08-09

## 2024-08-08 RX ORDER — HEPARIN SODIUM 5000 [USP'U]/ML
5000 INJECTION, SOLUTION INTRAVENOUS; SUBCUTANEOUS EVERY 8 HOURS SCHEDULED
DISCHARGE
Start: 2024-08-08 | End: 2024-08-29

## 2024-08-08 RX ORDER — SENNOSIDES A AND B 8.6 MG/1
2 TABLET, FILM COATED ORAL NIGHTLY
Qty: 60 TABLET | Refills: 3 | DISCHARGE
Start: 2024-08-08

## 2024-08-08 RX ORDER — OXYCODONE HYDROCHLORIDE 5 MG/1
2.5-5 TABLET ORAL EVERY 6 HOURS PRN
Qty: 28 TABLET | Refills: 0 | Status: SHIPPED | DISCHARGE
Start: 2024-08-08 | End: 2024-08-15

## 2024-08-08 RX ADMIN — BUPROPION HYDROCHLORIDE 150 MG: 150 TABLET, EXTENDED RELEASE ORAL at 08:32

## 2024-08-08 RX ADMIN — HEPARIN SODIUM 5000 UNITS: 5000 INJECTION INTRAVENOUS; SUBCUTANEOUS at 15:55

## 2024-08-08 RX ADMIN — ACETAMINOPHEN 650 MG: 325 TABLET ORAL at 23:42

## 2024-08-08 RX ADMIN — DOCUSATE SODIUM 100 MG: 50 LIQUID ORAL at 20:33

## 2024-08-08 RX ADMIN — MEGESTROL ACETATE 200 MG: 400 SUSPENSION ORAL at 08:31

## 2024-08-08 RX ADMIN — ACETAMINOPHEN 650 MG: 325 TABLET ORAL at 15:54

## 2024-08-08 RX ADMIN — PRIMIDONE 50 MG: 50 TABLET ORAL at 08:30

## 2024-08-08 RX ADMIN — TRAZODONE HYDROCHLORIDE 100 MG: 100 TABLET ORAL at 20:33

## 2024-08-08 RX ADMIN — HEPARIN SODIUM 5000 UNITS: 5000 INJECTION INTRAVENOUS; SUBCUTANEOUS at 08:31

## 2024-08-08 RX ADMIN — MEGESTROL ACETATE 200 MG: 400 SUSPENSION ORAL at 15:54

## 2024-08-08 RX ADMIN — HEPARIN SODIUM 5000 UNITS: 5000 INJECTION INTRAVENOUS; SUBCUTANEOUS at 23:42

## 2024-08-08 RX ADMIN — DOCUSATE SODIUM 100 MG: 50 LIQUID ORAL at 15:54

## 2024-08-08 RX ADMIN — ACETAMINOPHEN 650 MG: 325 TABLET ORAL at 05:33

## 2024-08-08 RX ADMIN — CARBIDOPA AND LEVODOPA 1 TABLET: 10; 100 TABLET ORAL at 05:32

## 2024-08-08 RX ADMIN — OXYCODONE 5 MG: 5 TABLET ORAL at 05:33

## 2024-08-08 RX ADMIN — CARBIDOPA AND LEVODOPA 1 TABLET: 10; 100 TABLET ORAL at 11:51

## 2024-08-08 RX ADMIN — SERTRALINE 50 MG: 50 TABLET, FILM COATED ORAL at 08:30

## 2024-08-08 RX ADMIN — DOCUSATE SODIUM 100 MG: 50 LIQUID ORAL at 08:31

## 2024-08-08 RX ADMIN — MEGESTROL ACETATE 200 MG: 400 SUSPENSION ORAL at 11:52

## 2024-08-08 RX ADMIN — BUPROPION HYDROCHLORIDE 300 MG: 300 TABLET, EXTENDED RELEASE ORAL at 08:31

## 2024-08-08 RX ADMIN — CARBIDOPA AND LEVODOPA 1 TABLET: 10; 100 TABLET ORAL at 15:54

## 2024-08-08 RX ADMIN — ACETAMINOPHEN 650 MG: 325 TABLET ORAL at 11:51

## 2024-08-08 RX ADMIN — FOLIC ACID 500 MCG: 1 TABLET ORAL at 08:30

## 2024-08-08 RX ADMIN — SENNOSIDES 17.2 MG: 8.6 TABLET, FILM COATED ORAL at 20:34

## 2024-08-08 RX ADMIN — AMLODIPINE BESYLATE 10 MG: 10 TABLET ORAL at 08:31

## 2024-08-08 RX ADMIN — POLYETHYLENE GLYCOL 3350 17 G: 17 POWDER, FOR SOLUTION ORAL at 08:31

## 2024-08-08 RX ADMIN — CLOPIDOGREL BISULFATE 75 MG: 75 TABLET ORAL at 08:31

## 2024-08-08 ASSESSMENT — PAIN SCALES - GENERAL
PAINLEVEL_OUTOF10: 2
PAINLEVEL_OUTOF10: 0

## 2024-08-08 NOTE — PLAN OF CARE
Problem: Discharge Planning  Goal: Discharge to home or other facility with appropriate resources  8/8/2024 1336 by Kusum Guajardo LISW  Note: Transportation:   Has transportation kept you from medical appointments, meetings, work, or from getting things needed for daily living? (Check all that apply)  No.      Health Literacy:   How often do you need to have someone help you when you read instructions, pamphlets, or other written material from your doctor or pharmacy?  0. - Never    Social Isolation:  How often do you feel lonely or isolated from those around you?  2.  Sometimes      Patient Mood Interview (PHQ-2 to 9) (from [employer]©)   Say to Patient: \"Over the last 2 weeks, have you been bothered by any of the following problems?\"   If symptom is present, enter 1 (yes) in column 1 (Symptom Presence)  If yes in column 1, then ask the patient: “About how often have you been bothered by this?”  Read and show the patient a card with the symptom frequency choices.    Indicate response in column 2, Symptom Frequency.   Symptom Presence  No (enter 0 in column 2)   Yes (enter 0-3 in column 2)  9.    No response (leave column 2 blank) Symptom Frequency  Never or 1 day  2-6 days (several days)  7-11 days (half or more of the days)  12-14 days (nearly every day    Symptom Presence Symptom Frequency   Little interest or pleasure in doing things 1. Yes 3. 12-14 days (nearly every day)   Feeling down, depressed, or hopeless 1. Yes 3. 12-14 days (nearly every day)        If either A or B above has symptom frequency coded 2 or 3, CONTINUE asking questions below.  If not, END the interview   Trouble falling or staying asleep, or sleeping too much 0. No 0. Never or 1 day   Feeling tired or having little energy 1. Yes 3. 12-14 days (nearly every day)   Poor appetite or overeating 1. Yes 1. 2-6 days (several days)   Feeling bad about yourself-or that you are a failure or have let yourself or your family down 1. Yes 3. 12-14  days (nearly every day)   Trouble concentrating on things, such as reading the newspaper or watching television 0. No 0. Never or 1 day    Moving or speaking so slowly that other people could have noticed.   Or the opposite-being so fidgety or restless that you have been moving around a lot more than usual 0. No 0. Never or 1 day   Thoughts that you would be better off dead, or of hurting yourself in some way 0. No 0. Never or 1 day

## 2024-08-08 NOTE — PROGRESS NOTES
Discharge pain assessment:    Complete within 3 days of discharge.      Pain Effect on Sleep ()   Ask patient: \"Over the past 5 days, how much of the time has pain made it hard for you to sleep at night?\" 1.  Rarely or not at all     If no pain is reported, end interview.  If pain is reported, continue with the additional questions.   Pain Interference with Therapy Activities   Ask patient: \"Over the past 5 days, how often have you limited your participation in rehabilitation therapy sessions due to pain?\" 2.  Occasionally   Pain Interference with Day to Day Activities   Ask patient: \"Over the past 5 days, how often have you limited your day to day activities (excluding rehabilitation therapy sessions) because of pain?\" 1.  Rarely or not at all

## 2024-08-08 NOTE — PROGRESS NOTES
[facility]   Nephrology progress note  8/8/2024, 11:54 AM      Pt Name:    Deloris Watts  MRN:     [id number]     YOB: 1955  Admit Date:    7/30/2024  6:02 PM    Chief Complaint: Nephrology following for ESRD    Subjective:  Patient seen and examined  Feels okay no complaints  No cp or SOB . mental status looks good    Objective:  24HR INTAKE/OUTPUT:    Intake/Output Summary (Last 24 hours) at 8/8/2024 1154  Last data filed at 8/8/2024 0942  Gross per 24 hour   Intake 1200 ml   Output 1400 ml   Net -200 ml      Admission weight: 49.1 kg (108 lb 3.9 oz)  Wt Readings from Last 3 Encounters:   08/07/24 49 kg (108 lb 0.4 oz)   07/29/24 48.7 kg (107 lb 5.8 oz)   07/10/24 46.6 kg (102 lb 11.8 oz)        Vitals :   Vitals:    08/07/24 1220 08/07/24 1517 08/07/24 2030 08/08/24 0826   BP: (!) 136/57 (!) 151/74 (!) 154/79 (!) 123/55   Pulse: 94 91 92 100   Resp: 19 19 20 18   Temp: 99.3 °F (37.4 °C) 98.6 °F (37 °C) 98.2 °F (36.8 °C) 97.7 °F (36.5 °C)   TempSrc:   Oral Oral   SpO2: 100% 100% 97% 99%   Weight: 50 kg (110 lb 3.7 oz) 49 kg (108 lb 0.4 oz)     Height:           Physical examination  General Appearance:  appears comfortable, no distress  Neck: No JVD noted  Lungs clear  Heart: S1-S2  Psych: Not agitated  CNS grossly intact    Medications:  Infusion:    sodium chloride       Meds:    docusate  100 mg Oral TID    megestrol  200 mg Oral TID WC    heparin (porcine)  5,000 Units SubCUTAneous 3 times per day    senna  2 tablet Oral Nightly    polyethylene glycol  17 g Oral Daily    oxyCODONE  5 mg Oral Q24H    carbidopa-levodopa  1 tablet Oral TID    traZODone  100 mg Oral Nightly    buPROPion  150 mg Oral QAM    buPROPion  300 mg Oral Daily    fluticasone  2 spray Each Nostril Daily    folic acid  500 mcg Oral Daily    primidone  50 mg Oral Daily    [Held by provider] sevelamer  800 mg Oral BID with meals    sertraline  50 mg Oral Daily    amLODIPine  10 mg Oral Daily    clopidogrel   75 mg Oral Daily    acetaminophen  650 mg Oral Q6H     Lab Data :  CBC:   Recent Labs     08/06/24  0700 08/08/24  0523   WBC 13.9* 13.9*   HGB 11.5* 10.3*   HCT 36.7* 33.0*   * 644*       CMP:  Recent Labs     08/06/24  0700 08/07/24  0642 08/08/24  0523    132* 136   K 4.0 4.5 4.3   CL 94* 92* 96*   CO2 27 22* 25   BUN 41* 59* 26*   CREATININE 3.5* 4.6* 3.1*   GLUCOSE 89 87 76   CALCIUM 9.1 9.1 8.9     Assessment and Plan:  ESRD on hemodialysis Monday Wednesday Friday  Volume status and electrolytes stable  Tolerated dialysis well yesterday with no acute need for dialysis today  Metabolic acidosis.  Improved with dialysis  Anemia in ESRD.  Hemoglobin stable.    Status post fall with femoral neck fracture status post surgery  Discussed with patient  Currently undergoing rehab    To Cuadra MD  [facility]    This report has been created using voice recognition software. It may contain minor errors which are inherent in voice recognition technology

## 2024-08-08 NOTE — DISCHARGE SUMMARY
Physical Medicine & Rehabilitation   Discharge Summary     Patient Identification:  Deloris Watts  : 1955  Admit date: 2024  Discharge date: 2024  Attending provider: Jax Whatley MD        Primary care provider: Johana Weldon MD     Discharge Diagnoses:   Acute right femoral neck fracture due to fall accident requiring right hemiarthroplasty surgery  History of stroke (right frontal lobe infarct) with left hemiparesis  Mental status change with worsening of cognition and mentation, to rule out intracranial abnormality, to rule out sepsis  End-stage renal disease requiring hemodialysis (Monday, Wednesday and Friday)  History of fall resulting liver laceration and acute L2 compression fracture requiring kyphoplasty   History of fall on 3/10/2024 resulting displaced left femur neck fracture requiring left hip hemiarthroplasty surgery  Parkinsonism  Essential hypertension  Oropharyngeal dysphagia  Irritable bowel syndrome  Hyperlipidemia  Osteoporosis  Essential tremor  GERD  Cognitive impairment  History of hydronephrosis  History of fibromyalgia  History of depression and anxiety  History of right proximal humerus fracture requiring ORIF  History of right wrist fracture requiring casting  History of right ankle fracture requiring casting       Discharge Functional Status:    Physical therapy:  Bed Mobility:  Rolling to Left: Stand By Assistance, with head of bed flat, with rail, with verbal cues , with increased time for completion   Rolling to Right: Stand By Assistance, with head of bed flat, with rail, with verbal cues , with increased time for completion   Supine to Sit: Moderate Assistance, with head of bed flat, without rail, with increased time for completion  Sit to Supine: Stand By Assistance, with head of bed flat   Scooting: Minimal Assistance, Moderate Assistance, X 1, to scoot to edge of bed     Transfers:  Sit to Stand: Minimal Assistance, with increased time for

## 2024-08-08 NOTE — PROGRESS NOTES
Patient educated on how to use incentive spirometer. Patient can not verbalize understanding and has not demonstrated proper use. Emphasized importance and usage of device, with coughing and deep breathing every 4 hours while awake.

## 2024-08-08 NOTE — PLAN OF CARE
Problem: Discharge Planning  Goal: Discharge to home or other facility with appropriate resources  8/8/2024 0029 by Beverley Hendrickson RN  Outcome: Progressing  Flowsheets (Taken 8/7/2024 2030)  Discharge to home or other facility with appropriate resources: Identify barriers to discharge with patient and caregiver     Problem: Safety - Adult  Goal: Free from fall injury  8/8/2024 0029 by Beverley Hendrickson RN  Outcome: Progressing     Problem: Pain  Goal: Verbalizes/displays adequate comfort level or baseline comfort level  8/8/2024 0029 by Beverley Hendrickson RN  Outcome: Progressing  Flowsheets (Taken 8/7/2024 2030)  Verbalizes/displays adequate comfort level or baseline comfort level:   Encourage patient to monitor pain and request assistance   Assess pain using appropriate pain scale   Administer analgesics based on type and severity of pain and evaluate response   Implement non-pharmacological measures as appropriate and evaluate response     Problem: Skin/Tissue Integrity  Goal: Absence of new skin breakdown  Description: 1.  Monitor for areas of redness and/or skin breakdown  2.  Assess vascular access sites hourly  3.  Every 4-6 hours minimum:  Change oxygen saturation probe site  4.  Every 4-6 hours:  If on nasal continuous positive airway pressure, respiratory therapy assess nares and determine need for appliance change or resting period.  8/8/2024 0029 by Beverley Hendrickson RN  Outcome: Progressing     Problem: ABCDS Injury Assessment  Goal: Absence of physical injury  8/8/2024 0029 by Beverley Hendrickson RN  Outcome: Progressing     Problem: Chronic Conditions and Co-morbidities  Goal: Patient's chronic conditions and co-morbidity symptoms are monitored and maintained or improved  8/8/2024 0029 by Beverley Hendrickson RN  Outcome: Progressing  Flowsheets (Taken 8/7/2024 2030)  Care Plan - Patient's Chronic Conditions and Co-Morbidity Symptoms are Monitored and Maintained or Improved: Monitor and assess patient's chronic

## 2024-08-08 NOTE — PLAN OF CARE
Problem: Discharge Planning  Goal: Discharge to home or other facility with appropriate resources  8/8/2024 1335 by Kusum Guajardo LISW  Note: Team conference held Thursday, 8/8. Recommendations of the team were explained to the patient by SW. Team is recommending that patient continue on acute inpatient rehab for PT, OT and ST, with expected discharge date of Friday, 8/9. Following discharge, team is recommending SNF. Care plan reviewed with patient. Patient verbalized understanding of the plan of care and contributed to goal setting.     SW left a message for Tenzin on this date to confirm transport for Friday, 8/9 at 1 PM. SW made accessible for any additional needs.    SW to follow and maintain involvement in discharge planning.

## 2024-08-08 NOTE — PROGRESS NOTES
From: Family  ADL Assistance: Needs assistance  Homemaking Assistance: Needs assistance  Ambulation Assistance: Needs assistance  Transfer Assistance: Needs assistance    Active : No  Patient's  Info: spouse performs transportation needs     Additional Comments: Pt's spouse assists with ADLs/IADLs, transfers and mobility tasks.    SUBJECTIVE: Patient lying in bed upon arrival. Agreeable to OT session     PAIN: Complains of pain in RLE      Vitals: Vitals not assessed per clinical judgement, see nursing flowsheet    COGNITION: Slow Processing, Decreased Recall, Decreased Insight, Impaired Memory, Decreased Problem Solving, and Decreased Safety Awareness  Completed CAM/BIMs 10/15 (scored 3/15 on evaluation)     ADL:     EATING:Supervision or touching assistance.   .  CARE Score: 4.     ORAL HYGIENE:Supervision or touching assistance (Cues for initation).   .  CARE Score: 4.     TOILETING HYGIENE:Partial/moderate assistance (For thoroughness after bowel movement. Flako for clothing management).   .  CARE Score: 3.     SHOWERING/BATHING:Partial/moderate assistance (Flako for bottom/ortiz area while standing and LEs (unable to complete while maintianing hip precautions due to decreased recall). Moderate verbal cues for intiation).   .  CARE Score: 3.     UPPER BODY DRESSING:Partial/moderate assistance (To doff/don sweatshirt. Able to doff/don tshirt with cues provided for intiation/sequencing.).   .  CARE Score: 3.     LOWER BODY DRESSING:Substantial/maximal assistance (Attempted to utilize reacher, patient unable to problem solve how to properly use despite cues provided. Assist to thread. Flako for clothing management).   .  CARE Score: 2.     FOOTWEAR:Dependent (To doff/don ACE hose and shoes)   .  CARE Score: 1.     TOILET TRANSFER: Partial/moderate assistance (To/from STS using grab bars with cues for hand placement).   . CARE Score: 3.         IADL:   Not Tested    BALANCE:  Sitting Balance:  Stand By  Assistance.    Standing Balance: Minimal Assistance.      BED MOBILITY:  Rolling to Left: Stand By Assistance    Supine to Sit: Minimal Assistance  - increased time, using side rail, cues for technique    TRANSFERS:  Sit to Stand:  Minimal Assistance. From various surfaces with increased time, min verbal cues for hand placement  Stand to Sit: Minimal Assistance. To various surfaces with increased time, min verbal cues for hand placement  Stand pivot: Minimal Assistance from w/c to STS, STS to w/c using RW with cues for technique    FUNCTIONAL MOBILITY:  Assistive Device: Rolling Walker  Assist Level:  Minimal Assistance.   Distance:  Short distances ~5 ft x3 trials  Cues provided to maintain close proximity to walker and for walker safety.        Modified [place] Scale:  Not Applicable    ASSESSMENT:     Activity Tolerance:  Patient tolerance of  treatment: Fair treatment tolerance, Limited by pain, Limited by fatigue, Reduced activity pace, and Need for increased rest breaks      Discharge Recommendations: Subacute/skilled nursing facility, 24 hour assistance or supervision, and Not safe to return home at this time  Equipment Recommendations: Other: defer at this time  Plan: Times Per Week: 5x/wk for 60 minutes  Current Treatment Recommendations: Strengthening, Balance training, Functional mobility training, Endurance training, Cognitive reorientation, Safety education & training, Patient/Caregiver education & training, Equipment evaluation, education, & procurement, Self-Care / ADL, Positioning, Gait training    Education:  Learners: Patient  Safety with transfers and mobility, ADL strategies     Goals  Short Term Goals  Time Frame for Short Term Goals: 1 week  Short Term Goal 1: Pt. to tolerate standing up to 2 minutes with 1 hand release CGA to improve performance with clothing management.  Short Term Goal 2: Pt. to transfer to/from toilet/BSC with Min A x1 to promote improved performance with ADLs.  Short Term

## 2024-08-08 NOTE — CARE COORDINATION
Denies having pain this shift, pillow propping used and will lie on her left side.  Had Zofran earlier I shift due to c/o of nausea and was effective.

## 2024-08-08 NOTE — PROGRESS NOTES
[facility]  INPATIENT PHYSICAL THERAPY  DAILY NOTE  [facility] - [id number]      Discharge Recommendations: Subacte/Skilled Nursing Facility  Equipment Recommendations:Equipment Needed: No    Time In: 0700  Time Out: 0830  Timed Code Treatment Minutes: 90 Minutes  Minutes: 90          Date: 2024  Patient Name: Deloris Watts,  Gender:  female        MRN: [id number]  : 1955  (68 y.o.)     Referring Practitioner: Jax Whatley MD  Diagnosis: Closed fracture of neck of right demu, initial encounter ([facility])  Additional Pertinent Hx: Per EMR: \" Deloris is a 68 year old female whom has been in/out of hospital for quite some time with varying medical conditions.  Most recently pt re-addmitted back to the hosital due to a fall in the home environment in which resulting in pain in LE with imaging indicating an acute fracture in the R femoral neck with diffuse osteopenia.  Pt. underwent open treatment to R femoral neck fracture with prothesis on 24 and is WBAT with hip precautions in place. See physicans H&P for additional information.\"     Prior Level of Function:  Lives With: Spouse  Type of Home: House  Home Layout: One level, Able to Live on Main level with bedroom/bathroom, Performs ADL's on one level  Home Access: Ramped entrance (spouse assists pt up/down ramp with transport chair)  Home Equipment: Walker - Rolling, Walker - 4-Wheeled, Wheelchair - Manual, Lift chair, Reacher (transport chair)   Bathroom Shower/Tub: Tub/Shower unit  Bathroom Toilet: Handicap height  Bathroom Equipment: Grab bars in shower, Shower chair  Bathroom Accessibility: Accessible    Receives Help From: Family  ADL Assistance: Needs assistance  Homemaking Assistance: Needs assistance  Ambulation Assistance: Needs assistance  Transfer Assistance: Needs assistance  Active : No  Additional Comments: Pt's spouse assists with ADLs/IADLs, transfers and mobility

## 2024-08-08 NOTE — PROGRESS NOTES
[facility]  Recreational Therapy  Daily Note  [facility]    Time Spent with Patient: 23 minutes    Date:  8/8/2024       Patient Name: Deloris Watts      MRN: [id number]      YOB: 1955 (68 y.o.)       Gender: female  Diagnosis: closed fracture of neck of right femur, initial encounter  Referring Practitioner: Jax Whatley MD    RESTRICTIONS/PRECAUTIONS:  Restrictions/Precautions: Fall Risk, General Precautions, Weight Bearing, Surgical protocol     Hearing: Exceptions to WFL  Hearing Exceptions: Hard of hearing/hearing concerns    PAIN: 0-no c/o pain     SUBJECTIVE:  when are the olympics going to be over     OBJECTIVE:  Upon arrival pt resting in recliner watching the olympics on tv-states she is going to get sick-handed her emesis basin but just spit up mucous -states she could not eat her lunch-states she isn't hungry and is nauseated-she did eat a little of her magic cup and gave her a rosey mist on ice that she was sipping on-as we talked pt asked questions about the olympics and was brighter and more social-states she might be anxious about going home tomorrow and that's why she doesn't feel good-RT did not mention nursing home-supportive contact given         Patient Education  New Education Provided: Importance of Leisure,     Electronically signed by: Elena Tan CTRS  Date: 8/8/2024

## 2024-08-08 NOTE — CARE COORDINATION
Pt alert and oriented this shift x 4. Able to answer all questions appropriately. Small hard BM today. Dr Whatley updated on stool consistency and colace frequency adjusted. Virtual  order discontinued and Telesitter removed from room per Dr. Whatley in anticipation for discharge to SNF tomorrow. No attempts were made by patient to transfer unassisted and she did not set off any alarms this shift.

## 2024-08-08 NOTE — PROGRESS NOTES
[facility]  INPATIENT SPEECH THERAPY  [facility]  DISCHARGE NOTE    TIME   SLP Individual Minutes  Time In: 0830  Time Out: 0900  Minutes: 30  Timed Code Treatment Minutes: 0 Minutes  Cognitive tx: 0 minutes   Dysphagia tx: 22 minutes   Speech tx: 8 minutes     Date: 2024  Patient Name: Deloris Watts      CSN: [id number]   : 1955  (68 y.o.)  Gender: female   Referring Physician:  Jax Whatley MD   Diagnosis: Closed fracture of neck of right femur, initial encounter   Precautions: Fall Risk, Aspiration Risk  Current Diet: Puree diet, thin liquids   Respiratory Status: Room Air  Swallowing Strategies: Full Upright Position, Small Bite/Sip, Medications Whole with Puree, Alternate Solids and Liquids, Limit Distractions, and Monitor for Fatigue   Date of Last MBS/FEES: 2024    Pain:  No pain reported.    Subjective:  Patient sitting upright in recliner for duration of session.  present and actively engaged.      Short-Term Goals:  SHORT TERM GOAL #1:  Goal 1: Patient will consume conservative minced and moist diet (advanced textures as indicated) and thin liquids with use of compensatory strategies and no overt s/s of aspiration or pulmonary decline to maintain adequate nutrition adn hydration measures. GOAL MET  INTERVENTIONS:  Completed skilled dietary analysis with morning meal consisting of minced sausage and eggs, cream of wheat and yogurt, as well as thin liquids. Patient needing some encouragement to initiate meal, but demonstrating improved alertness and engagement in po consumption. Mild oral phase impairments evident with minced textures, with patient engaging in prolonged mastication time (despite meat already being in ground state). Reduced bolus formation evident resulting in diffuse residuals throughout oral cavity. Cues needed to utilize liquid wash periodically throughout meal to assist with oral clearance. No overt cough or throat  WFL for basic communicative interactions. Currently safely consuming minced and moist diet with thin liquids. Recommend continued ST interventions at SNF for dysphagia management and acclimation to new environment/routines.   Activity Tolerance:  Patient tolerance of  treatment: Good; Appropriate participation   Assessment/Plan: Patient discharged from Speech Therapy at this time due to completion of IPR stay.  Discharge Disposition: SNF.  Continued Speech Therapy Services recommended: Yes.       Discharge Recommendations: SNF     Ashanti Valente MS, CCC-SLP [id number]

## 2024-08-09 ENCOUNTER — TELEPHONE (OUTPATIENT)
Dept: PSYCHIATRY | Age: 69
End: 2024-08-09

## 2024-08-09 VITALS
DIASTOLIC BLOOD PRESSURE: 67 MMHG | BODY MASS INDEX: 20.78 KG/M2 | WEIGHT: 105.82 LBS | SYSTOLIC BLOOD PRESSURE: 134 MMHG | HEART RATE: 92 BPM | TEMPERATURE: 98.7 F | RESPIRATION RATE: 18 BRPM | OXYGEN SATURATION: 96 % | HEIGHT: 60 IN

## 2024-08-09 LAB
ANION GAP SERPL CALC-SCNC: 15 MEQ/L (ref 8–16)
BUN SERPL-MCNC: 55 MG/DL (ref 7–22)
CALCIUM SERPL-MCNC: 9.6 MG/DL (ref 8.5–10.5)
CHLORIDE SERPL-SCNC: 92 MEQ/L (ref 98–111)
CO2 SERPL-SCNC: 22 MEQ/L (ref 23–33)
CREAT SERPL-MCNC: 4.3 MG/DL (ref 0.4–1.2)
GFR SERPL CREATININE-BSD FRML MDRD: 10 ML/MIN/1.73M2
GLUCOSE SERPL-MCNC: 89 MG/DL (ref 70–108)
POTASSIUM SERPL-SCNC: 4.7 MEQ/L (ref 3.5–5.2)
SODIUM SERPL-SCNC: 129 MEQ/L (ref 135–145)

## 2024-08-09 PROCEDURE — 6370000000 HC RX 637 (ALT 250 FOR IP): Performed by: PHYSICAL MEDICINE & REHABILITATION

## 2024-08-09 PROCEDURE — 99239 HOSP IP/OBS DSCHRG MGMT >30: CPT | Performed by: PHYSICAL MEDICINE & REHABILITATION

## 2024-08-09 PROCEDURE — 99232 SBSQ HOSP IP/OBS MODERATE 35: CPT | Performed by: INTERNAL MEDICINE

## 2024-08-09 PROCEDURE — 90935 HEMODIALYSIS ONE EVALUATION: CPT

## 2024-08-09 PROCEDURE — 80048 BASIC METABOLIC PNL TOTAL CA: CPT

## 2024-08-09 PROCEDURE — 36415 COLL VENOUS BLD VENIPUNCTURE: CPT

## 2024-08-09 PROCEDURE — 6360000002 HC RX W HCPCS: Performed by: PHYSICAL MEDICINE & REHABILITATION

## 2024-08-09 RX ADMIN — SERTRALINE 50 MG: 50 TABLET, FILM COATED ORAL at 11:01

## 2024-08-09 RX ADMIN — FOLIC ACID 500 MCG: 1 TABLET ORAL at 11:01

## 2024-08-09 RX ADMIN — CARBIDOPA AND LEVODOPA 1 TABLET: 10; 100 TABLET ORAL at 11:01

## 2024-08-09 RX ADMIN — AMLODIPINE BESYLATE 10 MG: 10 TABLET ORAL at 11:01

## 2024-08-09 RX ADMIN — BUPROPION HYDROCHLORIDE 150 MG: 150 TABLET, EXTENDED RELEASE ORAL at 11:02

## 2024-08-09 RX ADMIN — MEGESTROL ACETATE 200 MG: 400 SUSPENSION ORAL at 11:01

## 2024-08-09 RX ADMIN — ACETAMINOPHEN 650 MG: 325 TABLET ORAL at 06:07

## 2024-08-09 RX ADMIN — CLOPIDOGREL BISULFATE 75 MG: 75 TABLET ORAL at 11:01

## 2024-08-09 RX ADMIN — DOCUSATE SODIUM 100 MG: 50 LIQUID ORAL at 11:01

## 2024-08-09 RX ADMIN — ACETAMINOPHEN 650 MG: 325 TABLET ORAL at 11:01

## 2024-08-09 RX ADMIN — BUPROPION HYDROCHLORIDE 300 MG: 300 TABLET, EXTENDED RELEASE ORAL at 11:02

## 2024-08-09 RX ADMIN — CARBIDOPA AND LEVODOPA 1 TABLET: 10; 100 TABLET ORAL at 06:07

## 2024-08-09 RX ADMIN — HEPARIN SODIUM 5000 UNITS: 5000 INJECTION INTRAVENOUS; SUBCUTANEOUS at 11:01

## 2024-08-09 RX ADMIN — OXYCODONE 5 MG: 5 TABLET ORAL at 06:07

## 2024-08-09 RX ADMIN — PRIMIDONE 50 MG: 50 TABLET ORAL at 11:01

## 2024-08-09 NOTE — PROGRESS NOTES
[facility]   Nephrology progress note  8/9/2024, 11:03 AM      Pt Name:    Deloris Watts  MRN:     [id number]     YOB: 1955  Admit Date:    7/30/2024  6:02 PM    Chief Complaint: Nephrology following for ESRD    Subjective:  Patient seen and examined  Feels okay no complaints  No cp or SOB . mental status looks good  Had dialysis done this morning tolerated well    Objective:  24HR INTAKE/OUTPUT:    Intake/Output Summary (Last 24 hours) at 8/9/2024 1103  Last data filed at 8/9/2024 1020  Gross per 24 hour   Intake 830 ml   Output 1400 ml   Net -570 ml      Admission weight: 49.1 kg (108 lb 3.9 oz)  Wt Readings from Last 3 Encounters:   08/09/24 48 kg (105 lb 13.1 oz)   07/29/24 48.7 kg (107 lb 5.8 oz)   07/10/24 46.6 kg (102 lb 11.8 oz)        Vitals :   Vitals:    08/08/24 2025 08/09/24 0731 08/09/24 1020 08/09/24 1058   BP: (!) 136/57 (!) 134/56 (!) 157/72 134/67   Pulse: 96 94 82 92   Resp: 16 16 15 18   Temp: 97.9 °F (36.6 °C) 97.3 °F (36.3 °C) 97.1 °F (36.2 °C) 98.7 °F (37.1 °C)   TempSrc: Oral   Oral   SpO2: 97% 97% 97% 96%   Weight:   48 kg (105 lb 13.1 oz)    Height:           Physical examination  General Appearance:  appears comfortable, no distress  Neck: No JVD noted  Lungs clear  Heart: S1-S2  Psych: Not agitated  CNS grossly intact    Medications:  Infusion:    sodium chloride       Meds:    docusate  100 mg Oral TID    megestrol  200 mg Oral TID WC    heparin (porcine)  5,000 Units SubCUTAneous 3 times per day    senna  2 tablet Oral Nightly    polyethylene glycol  17 g Oral Daily    oxyCODONE  5 mg Oral Q24H    carbidopa-levodopa  1 tablet Oral TID    traZODone  100 mg Oral Nightly    buPROPion  150 mg Oral QAM    buPROPion  300 mg Oral Daily    fluticasone  2 spray Each Nostril Daily    folic acid  500 mcg Oral Daily    primidone  50 mg Oral Daily    [Held by provider] sevelamer  800 mg Oral BID with meals    sertraline  50 mg Oral Daily    amLODIPine  10 mg Oral  Daily    clopidogrel  75 mg Oral Daily    acetaminophen  650 mg Oral Q6H     Lab Data :  CBC:   Recent Labs     08/08/24  0523   WBC 13.9*   HGB 10.3*   HCT 33.0*   *       CMP:  Recent Labs     08/07/24  0642 08/08/24  0523 08/09/24  0643   * 136 129*   K 4.5 4.3 4.7   CL 92* 96* 92*   CO2 22* 25 22*   BUN 59* 26* 55*   CREATININE 4.6* 3.1* 4.3*   GLUCOSE 87 76 89   CALCIUM 9.1 8.9 9.6     Assessment and Plan:  ESRD on hemodialysis Monday Wednesday Friday  Volume status and electrolytes stable  Had dialysis this morning tolerated well  Metabolic acidosis.  Improved with dialysis  Anemia in ESRD.  Hemoglobin stable.    Status post fall with femoral neck fracture status post surgery  Discussed with patient  Currently undergoing rehab    For discharge today okay from renal standpoint for discharge no follow-up needed will see her in the dialysis center    To Cuadra MD  [facility]    This report has been created using voice recognition software. It may contain minor errors which are inherent in voice recognition technology

## 2024-08-09 NOTE — PROGRESS NOTES
[facility]  INPATIENT OCCUPATIONAL THERAPY  [facility]  DISCHARGE NOTE    Date: 2024  Patient Name: Deloris Watts,   Gender: female      MRN: [id number]  : 1955  (68 y.o.)  Referring Practitioner: Jax Whatley MD  Diagnosis: closed fracture of neck of right femur, initial encounter  Additional Pertinent Hx: Deloris is a 68 year old female whom has been in/out of hospital for quite some time with varying medical conditions.  Most recently pt [facility] due to a fall in the home environment in which resulting in pain in LE with imaging indicating an acute fracture in the R femoral neck with diffuse osteopenia.  Pt. underwent open treatment to R femoral neck fracture with prothesis on 24 and is WBAT. Pt. admitted back to the [facility] on 24 for further medical care and referred to skilled OT services at this time.  See physicans H&P for additional information.    Restrictions/Precautions:  Restrictions/Precautions: Fall Risk, General Precautions, Weight Bearing, Surgical protocol    Right Lower Extremity Weight Bearing: Weight Bearing As Tolerated       Position Activity Restriction  Hip Precautions: No hip flexion > 90 degrees, No hip internal rotation, No hip external rotation, No ADduction         Past Medical History:   Diagnosis Date    Allergic rhinitis     Anemia in CKD (chronic kidney disease)     Anxiety     CHF (congestive heart failure) (HCC)     Closed fracture of right proximal humerus 2021    ORIF    Closed right ankle fracture     In ; treated with casting    CVA (cerebral infarction)     in  ; with left-sided weakness    Depression     Fibromyalgia     in     Headache(784.0)     Hemiplegia and hemiparesis following cerebral infarction affecting left non-dominant side (HCC)     in     Hydronephrosis 2023    Urogenital Implants, calculus of ureter, UTI    Hyperlipidemia     Hypertension      in 1980s    Irritable bowel syndrome     in 1990s    Kidney failure     Chronic Kidney Disease, Renal Dialysis started in 1/2023    Osteoporosis 2019    Unspecified cerebral artery occlusion with cerebral infarction     Unspecified sleep apnea     Wrist fracture, right 2018    Treated with casting     Past Surgical History:   Procedure Laterality Date    CARPAL TUNNEL RELEASE Right     in 1990s    COLONOSCOPY  2012    [facility]    CT BIOPSY RENAL  12/28/2022    CT BIOPSY RENAL 12/28/2022 [facility] CT SCAN    CYSTOSCOPY Left 08/29/2023    CYSTOSCOPY, LEFT URETERAL STENT PLACEMENT performed by Edilson Whalen MD at [facility] OR    ENDOSCOPY, COLON, DIAGNOSTIC  unsure    HEMORRHOID SURGERY  unsure    HIP SURGERY Left 03/11/2024    Left hemiarthroplasty performed by Virgilio Lezama DO at [facility] OR    HIP SURGERY Right 7/26/2024    RIGHT HIP HEMIARTHROPLASTY performed by Ebenezer Mantilla MD at [facility] OR    HUMERUS FRACTURE SURGERY Right 2021    ORIF    HYSTERECTOMY (CERVIX STATUS UNKNOWN)      age 38    LASIK      lasik    NECK SURGERY  18  years ago    cervical spine fusion ; in 1990s    OVARY REMOVAL Bilateral     age 38    SINUS SURGERY  10 years ago    SPINE SURGERY N/A 12/18/2023    KYPHOPLASTY L2 performed by Nimisha Garza MD at [facility] OR    TUBAL LIGATION      URETER SURGERY N/A 09/20/2023    CYSTOSCOPY, LEFT  URETEROSCOPY, LASER LITHOTRIPSIE OF STONES performed by Edilson Whalen MD at [facility] OR           Subjective  Pt. Not seen this date for OT services as pt d/c to SNF for further medical care.     Assessment:  Assessment: Deloris has made slight progress with self care performance and daily occupations.  However, pt had several medical complexities as well as need for increased assistance to carryout ADLs, transfers and unable to ambulate.  Per review of chart OT completed Modified Barthel Index and pt demo a score of 32/100 indicating severe dependency with daily task performance. Pt. Appropriate for SNF to best fit pt's daily needs.

## 2024-08-09 NOTE — TELEPHONE ENCOUNTER
Patient is a former patient of this provider. She has not been seen since 3/18/2021. She was referred back to me to re-establish care and was initially accepted with tentative appointment scheduled for 8/29/2024. However, patient has had numerous hospital admissions in the course of the last several months and since the beginning of the year. At this time, due to complexity of history and complexity of care, patient will need to be seen by either Dr. Ahn or Dr. Flor if they are agreeable.     Please note, patient is currently admitted for femoral neck fracture due to fall with planned discharge to SNF.     (She is also on hemodialysis.)

## 2024-08-09 NOTE — PROGRESS NOTES
Patient to be discharged on Friday, 8/9 to the [facility]. Patient will be under the supervision of staff. Family education was provided throughout patient's stay. Patient will be receiving services through the [facility]. VARSHA provided information to Vonda on 8/5 via Epic.

## 2024-08-09 NOTE — PROGRESS NOTES
[facility]  INPATIENT PHYSICAL THERAPY  DISCHARGENOTE  [facility]      Date: 2024  Patient Name: Deloris Watts,  Gender:  female        MRN: [id number]  : 1955  (68 y.o.)     Referring Practitioner: Jax Whatley MD  Diagnosis: Closed fracture of neck of right demu, initial encounter ([facility])  Additional Pertinent Hx: Per EMR: \" Deloris is a 68 year old female whom has been in/out of hospital for quite some time with varying medical conditions.  Most recently pt re-addmitted back to the [facility] due to a fall in the home environment in which resulting in pain in LE with imaging indicating an acute fracture in the R femoral neck with diffuse osteopenia.  Pt. underwent open treatment to R femoral neck fracture with prothesis on 24 and is WBAT with hip precautions in place. See physicans H&P for additional information.\"     Restrictions/Precautions:  Restrictions/Precautions: Fall Risk, General Precautions, Weight Bearing, Surgical protocol    Right Lower Extremity Weight Bearing: Weight Bearing As Tolerated       Position Activity Restriction  Hip Precautions: No hip flexion > 90 degrees, No hip internal rotation, No hip external rotation, No ADduction         Social/Functional:  Type of Home: House  Home Layout: One level, Able to Live on Main level with bedroom/bathroom, Performs ADL's on one level  Home Access: Ramped entrance (spouse assists pt up/down ramp with transport chair)  Home Equipment: Walker - Rolling, Walker - 4-Wheeled, Wheelchair - Manual, Lift chair, Reacher (transport chair)     Subjective:  N/A --quick discharge     Assessment:  At discharge Mrs Watts met 4/5 STG's and 2/6 LTG's.  Patient did not meet all goals, however has demonstrated good progress overall since initial evaluation.  Patient did not meet LTG for gait due to requiring CGA/min assist with a RW and did not meet LTG for bed mobility due to requiring SBA to moderate

## 2024-08-09 NOTE — FLOWSHEET NOTE
Stable 2.5 hour treatment complete. Removed 1 liter of fluid as per order. Tolerated fluid removal well. HD catheter ports flushed, clamped and capped. Dressing clean, dry and intact. Report given to primary RN. Treatment record printed for scanning into EMR.    08/09/24 0731 08/09/24 1020   Vital Signs   BP (!) 134/56 (!) 157/72   Temp 97.3 °F (36.3 °C) 97.1 °F (36.2 °C)   Pulse 94 82   Respirations 16 15   SpO2 97 % 97 %   Weight - Scale  --  48 kg (105 lb 13.1 oz)   Weight Method  --  Bed scale   Percent Weight Change  --  -2.04   Post-Hemodialysis Assessment   Post-Treatment Procedures  --  Blood returned;Catheter Capped, clamped with Saline x2 ports   Machine Disinfection Process  --  Acid/Vinegar Clean;Heat Disinfect;Exterior Machine Disinfection   Blood Volume Processed (Liters)  --  52.1 L   Dialyzer Clearance  --  Lightly streaked   Duration of Treatment (minutes)  --  150 minutes   Hemodialysis Intake (ml)  --  400 ml   Hemodialysis Output (ml)  --  1400 ml   NET Removed (ml)  --  1000   Tolerated Treatment  --  Good

## 2024-08-09 NOTE — PROGRESS NOTES
[facility]  Recreational Therapy  Discharge Note  Inpatient Rehabilitation Unit         Date:  8/9/2024       Patient Name: Deloris Watts      MRN: [id number]       YOB: 1955 (68 y.o.)       Gender: female  Diagnosis: closed fracture of neck of right femur, initial encounter  Referring Practitioner: Jax Whatley MD    Patient discharged from Recreational Therapy at this time.  See recreational therapy notes for details.    Electronically signed by: LOUIS Rodriguez  Date: 8/9/2024

## 2024-08-09 NOTE — H&P
Report call to [facility] at this time. All questions answered. Phone number provided for any additional questions.

## 2024-08-09 NOTE — PLAN OF CARE
Problem: Discharge Planning  Goal: Discharge to home or other facility with appropriate resources  8/9/2024 0119 by Beverley Hendrickson RN  Outcome: Progressing  Flowsheets (Taken 8/8/2024 2010)  Discharge to home or other facility with appropriate resources: Identify barriers to discharge with patient and caregiver     Problem: Safety - Adult  Goal: Free from fall injury  Outcome: Progressing     Problem: Pain  Goal: Verbalizes/displays adequate comfort level or baseline comfort level  Outcome: Progressing  Flowsheets (Taken 8/8/2024 2025)  Verbalizes/displays adequate comfort level or baseline comfort level:   Encourage patient to monitor pain and request assistance   Assess pain using appropriate pain scale   Administer analgesics based on type and severity of pain and evaluate response   Implement non-pharmacological measures as appropriate and evaluate response     Problem: Skin/Tissue Integrity  Goal: Absence of new skin breakdown  Description: 1.  Monitor for areas of redness and/or skin breakdown  2.  Assess vascular access sites hourly  3.  Every 4-6 hours minimum:  Change oxygen saturation probe site  4.  Every 4-6 hours:  If on nasal continuous positive airway pressure, respiratory therapy assess nares and determine need for appliance change or resting period.  Outcome: Progressing     Problem: ABCDS Injury Assessment  Goal: Absence of physical injury  Outcome: Progressing     Problem: Chronic Conditions and Co-morbidities  Goal: Patient's chronic conditions and co-morbidity symptoms are monitored and maintained or improved  Outcome: Progressing  Flowsheets (Taken 8/8/2024 2010)  Care Plan - Patient's Chronic Conditions and Co-Morbidity Symptoms are Monitored and Maintained or Improved: Monitor and assess patient's chronic conditions and comorbid symptoms for stability, deterioration, or improvement     Problem: Skin/Tissue Integrity - Adult  Goal: Skin integrity remains intact  Outcome:

## 2024-08-09 NOTE — PROGRESS NOTES
Patient educated on how to use incentive spirometer. Patient did not verbalized understanding or demonstrated proper use. Emphasized importance and usage of device, with coughing and deep breathing every 4 hours while awake.

## 2024-08-09 NOTE — PROGRESS NOTES
Patient discharged in stable condition as per order of attending physician to Skilled Facility per staff at Time: 1305 to [facility].    AVS provided by RN at time of discharge, which includes all necessary medical information pertaining to the patients current course of illness, treatment, medications, post-discharge goals of care, and treatment preferences.     Patient/ family verbalize understanding of discharge plan and are in agreement with goal/plan/treatment preferences.     Belongings including Glasses sent with patient.      Home medications sent home with patient yes - Linzess    Availability of \"My Chart\" offered to patient as a tool for updated health record.  Steps for activation discussed with patient as mentioned on AVS.

## 2024-08-09 NOTE — PLAN OF CARE
Problem: Discharge Planning  Goal: Discharge to home or other facility with appropriate resources  8/9/2024 0657 by Lo Machado RN  Outcome: Adequate for Discharge  8/9/2024 0119 by Beverley Hendrickson RN  Outcome: Progressing  Flowsheets (Taken 8/8/2024 2010)  Discharge to home or other facility with appropriate resources: Identify barriers to discharge with patient and caregiver     Problem: Safety - Adult  Goal: Free from fall injury  8/9/2024 0657 by Lo Machado RN  Outcome: Adequate for Discharge  8/9/2024 0119 by Beverley Hendrickson RN  Outcome: Progressing     Problem: Pain  Goal: Verbalizes/displays adequate comfort level or baseline comfort level  8/9/2024 0657 by Lo Machado RN  Outcome: Adequate for Discharge  8/9/2024 0119 by Beverley Hendrickson RN  Outcome: Progressing  Flowsheets (Taken 8/8/2024 2025)  Verbalizes/displays adequate comfort level or baseline comfort level:   Encourage patient to monitor pain and request assistance   Assess pain using appropriate pain scale   Administer analgesics based on type and severity of pain and evaluate response   Implement non-pharmacological measures as appropriate and evaluate response     Problem: Skin/Tissue Integrity  Goal: Absence of new skin breakdown  Description: 1.  Monitor for areas of redness and/or skin breakdown  2.  Assess vascular access sites hourly  3.  Every 4-6 hours minimum:  Change oxygen saturation probe site  4.  Every 4-6 hours:  If on nasal continuous positive airway pressure, respiratory therapy assess nares and determine need for appliance change or resting period.  8/9/2024 0657 by Lo Machado RN  Outcome: Adequate for Discharge  8/9/2024 0119 by Beverley Hendrickson RN  Outcome: Progressing     Problem: ABCDS Injury Assessment  Goal: Absence of physical injury  8/9/2024 0657 by Lo Machado RN  Outcome: Adequate for Discharge  8/9/2024 0119 by Beverley Hendrickson RN  Outcome: Progressing     Problem: Chronic Conditions  hygiene technique  Goal: Absence of infection during hospitalization  8/9/2024 0657 by Lo Machado, RN  Outcome: Adequate for Discharge  8/9/2024 0119 by Beverley Hendrickson, RN  Outcome: Progressing  Flowsheets (Taken 8/8/2024 2010)  Absence of infection during hospitalization:   Assess and monitor for signs and symptoms of infection   Monitor lab/diagnostic results   Administer medications as ordered   Instruct and encourage patient and family to use good hand hygiene technique   [place] appropriate cooling/warming therapies per order

## 2024-08-10 LAB — BACTERIA BLD AEROBE CULT: NORMAL

## 2024-08-12 ENCOUNTER — COMMUNITY CARE MANAGEMENT (OUTPATIENT)
Facility: CLINIC | Age: 69
End: 2024-08-12

## 2024-08-12 NOTE — PROGRESS NOTES
TCM Care Coordination Summary  The [facility] [phone] 08/12/24 Patient discharged to the [facility] 08/09/24. Call made to the [place] and spoke with patient's nurse. Nurse confirmed that patient was at their facility. Nurse stated that there were no needs at this time.-FORREST MITCHELL

## 2024-09-27 ENCOUNTER — APPOINTMENT (OUTPATIENT)
Dept: GENERAL RADIOLOGY | Age: 69
End: 2024-09-27
Payer: MEDICARE

## 2024-09-27 ENCOUNTER — HOSPITAL ENCOUNTER (EMERGENCY)
Age: 69
Discharge: NURSING, REHAB OR LONG-TERM CARE | End: 2024-09-28
Attending: EMERGENCY MEDICINE
Payer: MEDICARE

## 2024-09-27 ENCOUNTER — APPOINTMENT (OUTPATIENT)
Dept: CT IMAGING | Age: 69
End: 2024-09-27
Payer: MEDICARE

## 2024-09-27 VITALS
OXYGEN SATURATION: 97 % | WEIGHT: 108 LBS | RESPIRATION RATE: 23 BRPM | HEIGHT: 60 IN | DIASTOLIC BLOOD PRESSURE: 58 MMHG | HEART RATE: 91 BPM | BODY MASS INDEX: 21.2 KG/M2 | SYSTOLIC BLOOD PRESSURE: 136 MMHG | TEMPERATURE: 98.6 F
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
## 2024-09-27 DIAGNOSIS — S39.012A LUMBAR STRAIN, INITIAL ENCOUNTER: ICD-10-CM

## 2024-09-27 PROCEDURE — 72072 X-RAY EXAM THORAC SPINE 3VWS: CPT

## 2024-09-27 PROCEDURE — 70450 CT HEAD/BRAIN W/O DYE: CPT

## 2024-09-27 PROCEDURE — 99284 EMERGENCY DEPT VISIT MOD MDM: CPT

## 2024-09-27 PROCEDURE — 72170 X-RAY EXAM OF PELVIS: CPT

## 2024-09-27 PROCEDURE — 72125 CT NECK SPINE W/O DYE: CPT

## 2024-09-27 PROCEDURE — 72100 X-RAY EXAM L-S SPINE 2/3 VWS: CPT

## 2024-09-27 ASSESSMENT — ENCOUNTER SYMPTOMS
CONSTIPATION: 0
CHEST TIGHTNESS: 0
WHEEZING: 0
SINUS PRESSURE: 0
COLOR CHANGE: 0
BACK PAIN: 1
SORE THROAT: 0
ABDOMINAL PAIN: 0
VOICE CHANGE: 0
ABDOMINAL DISTENTION: 0
VOMITING: 0
RHINORRHEA: 0
EYE DISCHARGE: 0
NAUSEA: 0
SHORTNESS OF BREATH: 0
COUGH: 0
TROUBLE SWALLOWING: 0
BLOOD IN STOOL: 0
PHOTOPHOBIA: 0
EYE REDNESS: 0
EYE ITCHING: 0
CHOKING: 0
EYE PAIN: 0
DIARRHEA: 0

## 2024-09-27 ASSESSMENT — PAIN - FUNCTIONAL ASSESSMENT: PAIN_FUNCTIONAL_ASSESSMENT: 0-10

## 2024-09-28 NOTE — ED PROVIDER NOTES
[facility]  eMERGENCY dEPARTMENT eNCOUnter          CHIEF COMPLAINT       Chief Complaint   Patient presents with    Fall       Nurses Notes reviewed and I agree except as noted in the HPI.      HISTORY OF PRESENT ILLNESS    Deloris Watts is a 68 y.o. female who presents for back pain.  This is a 68-year-old female coming to us from the [facility] with a history of dementia and Parkinson's nonambulatory who apparently was transitioning from the wheelchair into the bed with the  when the  lost control of her and she fell down and struck her back he does not think she struck her head.  She is complaining about mid and low back pain.  Patient is able to lift her legs and move her arms without any difficulty.   at bedside states that there was no loss of consciousness.  Patient is having some mild point tenderness to the cervical thoracic and lumbar spine.  Patient has no loss of sensation distally.  Patient is otherwise resting comfortably on the cot no apparent distress no other physical complaints at this time.  This happened at approximately 630 this afternoon.  Patient was brought in at 10:00 at night.   wanted her checked out because she \"has apparently broken bones when she has fallen from her wheelchair in the past\".    REVIEW OF SYSTEMS     Review of Systems   Constitutional:  Negative for activity change, appetite change, diaphoresis, fatigue and unexpected weight change.   HENT:  Negative for congestion, ear discharge, ear pain, hearing loss, rhinorrhea, sinus pressure, sore throat, trouble swallowing and voice change.    Eyes:  Negative for photophobia, pain, discharge, redness and itching.   Respiratory:  Negative for cough, choking, chest tightness, shortness of breath and wheezing.    Cardiovascular:  Negative for chest pain, palpitations and leg swelling.   Gastrointestinal:  Negative for abdominal distention, abdominal pain, blood in stool,  constipation, diarrhea, nausea and vomiting.   Endocrine: Negative for polydipsia, polyphagia and polyuria.   Genitourinary:  Negative for decreased urine volume, difficulty urinating, dysuria, enuresis, frequency, hematuria, pelvic pain, urgency, vaginal bleeding and vaginal discharge.   Musculoskeletal:  Positive for arthralgias, back pain, gait problem (Chronic), myalgias and neck pain. Negative for joint swelling and neck stiffness.   Skin:  Negative for color change, pallor, rash and wound.   Allergic/Immunologic: Negative for immunocompromised state.   Neurological:  Negative for dizziness, tremors, seizures, syncope, facial asymmetry, weakness, light-headedness, numbness and headaches.   Hematological:  Negative for adenopathy. Does not bruise/bleed easily.   Psychiatric/Behavioral:  Negative for agitation, behavioral problems, confusion, decreased concentration, dysphoric mood, hallucinations, self-injury, sleep disturbance and suicidal ideas. The patient is not nervous/anxious and is not hyperactive.           PAST MEDICAL HISTORY    has a past medical history of Allergic rhinitis, Anemia in CKD (chronic kidney disease), Anxiety, CHF (congestive heart failure) ([facility]), Closed fracture of right proximal humerus, Closed right ankle fracture, CVA (cerebral infarction), Depression, Fibromyalgia, Headache(784.0), Hemiplegia and hemiparesis following cerebral infarction affecting left non-dominant side ([facility]), Hydronephrosis, Hyperlipidemia, Hypertension, Irritable bowel syndrome, Kidney failure, Osteoporosis, Unspecified cerebral artery occlusion with cerebral infarction, Unspecified sleep apnea, and Wrist fracture, right.    SURGICAL HISTORY      has a past surgical history that includes sinus surgery (10 years ago); Hemorrhoid surgery (unsure); Neck surgery (18  years ago); LASIK; Endoscopy, colon, diagnostic (unsure); Colonoscopy (2012); Tubal ligation; Carpal tunnel release (Right); Hysterectomy; Ovary removal

## 2024-09-28 NOTE — ED TRIAGE NOTES
Pt present to the ED via EMS from the [facility]. EMS states the pt fell from standing height onto her back. Pt reports lower back pain 5/10. Pt denies any numbness or tingling in the hands or feet. Pt denies any headache. Pt has dialysis 3x a week. EMS states she had dialysis today and fell when she got back to the [place]. EMS reports the pt did not loss consciousness and did not hit her head.  at the bedside states he had the EMS bring her to the ED because in the past she has broke bones when she falls. Pt has a history of dementia.

## 2024-09-28 NOTE — ED NOTES
Pt resting in bed with family at the bedside. RR easy and unlabored. No distress noted. Call light in reach. Pt denies any further requests at this time.

## 2024-10-29 ENCOUNTER — OFFICE VISIT (OUTPATIENT)
Dept: CARDIOLOGY CLINIC | Age: 69
End: 2024-10-29

## 2024-10-29 VITALS
BODY MASS INDEX: 21.09 KG/M2 | DIASTOLIC BLOOD PRESSURE: 78 MMHG | HEART RATE: 96 BPM | SYSTOLIC BLOOD PRESSURE: 131 MMHG | HEIGHT: 60 IN

## 2024-10-29 DIAGNOSIS — I10 ESSENTIAL HYPERTENSION: Primary | Chronic | ICD-10-CM

## 2024-10-29 DIAGNOSIS — N18.6 ESRD (END STAGE RENAL DISEASE) (HCC): ICD-10-CM

## 2024-10-29 DIAGNOSIS — R42 POSTURAL DIZZINESS: ICD-10-CM

## 2024-10-29 DIAGNOSIS — E78.00 PURE HYPERCHOLESTEROLEMIA: ICD-10-CM

## 2024-10-29 DIAGNOSIS — Z86.73 HISTORY OF CVA (CEREBROVASCULAR ACCIDENT): ICD-10-CM

## 2024-10-29 PROBLEM — I50.32 CHRONIC DIASTOLIC (CONGESTIVE) HEART FAILURE (HCC): Status: RESOLVED | Noted: 2022-12-21 | Resolved: 2024-10-29

## 2024-10-29 RX ORDER — SEVELAMER CARBONATE 800 MG/1
1 TABLET, FILM COATED ORAL
COMMUNITY

## 2024-10-29 RX ORDER — GUAIFENESIN 600 MG/1
1200 TABLET, EXTENDED RELEASE ORAL 2 TIMES DAILY
COMMUNITY

## 2024-10-29 RX ORDER — ACETAMINOPHEN 325 MG/1
650 TABLET ORAL EVERY 6 HOURS PRN
COMMUNITY

## 2024-10-29 RX ORDER — DOXAZOSIN 2 MG/1
2 TABLET ORAL NIGHTLY
COMMUNITY

## 2024-10-29 RX ORDER — MECLIZINE HYDROCHLORIDE 25 MG/1
25 TABLET ORAL 3 TIMES DAILY PRN
COMMUNITY

## 2024-10-29 RX ORDER — CALCIUM ACETATE 667 MG/1
TABLET ORAL
COMMUNITY

## 2024-10-29 NOTE — PROGRESS NOTES
block  Borderline ECG  When compared with ECG of 27-MAR-2019 19:51,  Premature atrial complexes are no longer Present  Incomplete right bundle branch block is now Present  Confirmed by ULISES SANTILLAN MD ([id number]) on 9/3/2020 10:52:42 PM       Conclusions    Summary   Normal left ventricle size and systolic function. Ejection fraction was   estimated at 65 %. There were no regional left ventricular wall motion   abnormalities and wall thickness was within normal limits.   Doppler parameters were consistent with abnormal left ventricular   relaxation (grade 1 diastolic dysfunction).   The left atrium is Mildly dilated.      Signature   ----------------------------------------------------------------   Electronically signed by Ulises Santillan MD (Interpreting   physician) on 09/14/2020 at 06:49 PM   ----------------------------------------------------------------      Conclusions      Summary   Normal left ventricle size and systolic function. Ejection fraction was   estimated at 55 to 60 %. There were no regional left ventricular wall   motion abnormalities and wall thickness was within normal limits.   Doppler parameters were consistent with abnormal left ventricular   relaxation (grade 1 diastolic dysfunction).   The left atrium is Moderately dilated.      Signature      ----------------------------------------------------------------   Electronically signed by Ulises Santillan MD (Interpreting   physician) on 02/23/2022 at 05:18 PM   ----------------------------------------------------------------     Conclusions    Summary   Lexiscan EKG stress test is not suggestive for ischemia.   The nuclear images is not suggestive for myocardial ischemia.      Recommendation   Clinical correlation is recommended due to poor image quality.      Signatures    ----------------------------------------------------------------   Electronically signed by Ulises Santillan MD (Interpreting   Cardiologist) on 09/15/2020 at 19:01

## 2024-11-19 ENCOUNTER — HOSPITAL ENCOUNTER (OUTPATIENT)
Dept: INTERVENTIONAL RADIOLOGY/VASCULAR | Age: 69
Discharge: HOME | End: 2024-11-19
Payer: MEDICARE

## 2024-11-19 DIAGNOSIS — N18.6 ESRD (END STAGE RENAL DISEASE) (HCC): ICD-10-CM

## 2024-11-19 PROCEDURE — 36598 INJ W/FLUOR EVAL CV DEVICE: CPT

## 2024-11-19 PROCEDURE — 2709999900 IR FLUOROSCOPY LESS THAN 1 HOUR

## 2024-11-19 NOTE — OP NOTE
Department of Radiology  Post Procedure Progress Note      Pre-Procedure Diagnosis:  Malfunctioning dialysis  Catheter     Procedure Performed:  catheter check     Anesthesia: local     Findings: successful, fibrin sheath     Immediate Complications:  None    Estimated Blood Loss: minimal    SEE DICTATED PROCEDURE NOTE FOR COMPLETE DETAILS.    Tank Newman MD   11/19/2024 5:01 PM

## 2024-11-19 NOTE — PROGRESS NOTES
1653 Patient received in IR for dialysis catheter dye study. This procedure has been fully reviewed with the patient and written informed consent has been obtained.  1658 Procedure started with .   1700 Procedure completed; patient tolerated well. Fibrin sheath noted per Dr Newman. Pt to have TPA infusion tomorrow with OPN.   1702 Patient taken to main radiology and discharged in stable condition. Pt follows commands. Skin pink, warm, and dry. Respirations easy, regular, and nonlabored.

## 2024-11-20 ENCOUNTER — HOSPITAL ENCOUNTER (OUTPATIENT)
Dept: NURSING | Age: 69
Discharge: HOME | End: 2024-11-20
Payer: MEDICARE

## 2024-11-20 VITALS
SYSTOLIC BLOOD PRESSURE: 159 MMHG | DIASTOLIC BLOOD PRESSURE: 80 MMHG | OXYGEN SATURATION: 96 % | RESPIRATION RATE: 18 BRPM | TEMPERATURE: 98.4 F | HEART RATE: 73 BPM

## 2024-11-20 LAB
ANION GAP SERPL CALC-SCNC: 15 MEQ/L (ref 8–16)
BUN SERPL-MCNC: 64 MG/DL (ref 7–22)
CALCIUM SERPL-MCNC: 9.3 MG/DL (ref 8.5–10.5)
CHLORIDE SERPL-SCNC: 97 MEQ/L (ref 98–111)
CO2 SERPL-SCNC: 21 MEQ/L (ref 23–33)
CREAT SERPL-MCNC: 7 MG/DL (ref 0.4–1.2)
GFR SERPL CREATININE-BSD FRML MDRD: 6 ML/MIN/1.73M2
GLUCOSE SERPL-MCNC: 86 MG/DL (ref 70–108)
POTASSIUM SERPL-SCNC: 5.2 MEQ/L (ref 3.5–5.2)
SODIUM SERPL-SCNC: 133 MEQ/L (ref 135–145)

## 2024-11-20 PROCEDURE — 80048 BASIC METABOLIC PNL TOTAL CA: CPT

## 2024-11-20 PROCEDURE — 2580000003 HC RX 258: Performed by: RADIOLOGY

## 2024-11-20 PROCEDURE — 96365 THER/PROPH/DIAG IV INF INIT: CPT

## 2024-11-20 PROCEDURE — 96366 THER/PROPH/DIAG IV INF ADDON: CPT

## 2024-11-20 PROCEDURE — 96368 THER/DIAG CONCURRENT INF: CPT

## 2024-11-20 PROCEDURE — 36415 COLL VENOUS BLD VENIPUNCTURE: CPT

## 2024-11-20 PROCEDURE — 99211 OFF/OP EST MAY X REQ PHY/QHP: CPT

## 2024-11-20 PROCEDURE — 6360000002 HC RX W HCPCS: Performed by: RADIOLOGY

## 2024-11-20 PROCEDURE — 6370000000 HC RX 637 (ALT 250 FOR IP): Performed by: INTERNAL MEDICINE

## 2024-11-20 RX ADMIN — ALTEPLASE 2 MG: 2.2 INJECTION, POWDER, LYOPHILIZED, FOR SOLUTION INTRAVENOUS at 08:33

## 2024-11-20 RX ADMIN — ALTEPLASE 2 MG: 2.2 INJECTION, POWDER, LYOPHILIZED, FOR SOLUTION INTRAVENOUS at 08:35

## 2024-11-20 RX ADMIN — SODIUM ZIRCONIUM CYCLOSILICATE 10 G: 10 POWDER, FOR SUSPENSION ORAL at 13:38

## 2024-11-20 NOTE — PROGRESS NOTES
0750: Patient arrived in wheelchair with , Tenzin, for TPA infusion. Dialysis catheter located in right chest.   Patient assisted to bed with 2 nurses.   Information obtained by Tenzin.   Vitals as charted.  Patient resting in bed, call light in reach.    0833: Alteplase infusions started in red and blue dialysis catheter.     1119: Alteplase infusions complete. Blue catheter had a good blood return. No blood return noted in red catheter. Jorge in IR notified. Spoke with Alexa at the [facility] and she stated patient took Aspirin and Plavix this morning at 7:02 AM. Jorge updated and she notified Dr. Newman.    1148: Emre in IR called and stated that he added a STAT BMP lab draw. He will call results to Dr. Cuadra to come up with a game plan after that.     1300: Blood work resulted- Emre called Dr. Cuadra- he stated patient ok to wait for catheter exchange either tomorrow or Friday. He ordered patient to get Lokelma oral suspension before she leaves.     1338: Lokelma administered- patient tolerated well. Erme with IR stated patient can go home and they will call her  with time and date for her dialysis catheter exchange.       1409:  Spoke with Alexa at the [facility] and informed her of plan possibly tomorrow and patient needing to hold blood thinners until exchange, she voiced understanding. AVS reviewed with patient, voiced understanding. Patient discharged in wheelchair.                    _m___ Safety:       (Environmental)  [place] to environment  Ensure ID band is correct and in place/ allergy band as needed  Assess for fall risk  Initiate fall precautions as applicable (fall band, side rails, etc.)  Call light within reach  Bed in low position/ wheels locked    _m___ Pain:       Assess pain level and characteristics  Administer analgesics as ordered  Assess effectiveness of pain management and report to MD as needed    _m___ Knowledge Deficit:  Assess baseline knowledge  Provide teaching at

## 2024-11-20 NOTE — DISCHARGE INSTRUCTIONS
Hold all blood thinners Aspirin and Plavix for dialysis catheter exchange.     Will need to be nothing to eat/drink after midnight.     Ok to take blood pressure medication with a sip of water morning of.

## 2024-11-21 ENCOUNTER — APPOINTMENT (OUTPATIENT)
Dept: INTERVENTIONAL RADIOLOGY/VASCULAR | Age: 69
End: 2024-11-21
Attending: RADIOLOGY
Payer: MEDICARE

## 2024-11-21 ENCOUNTER — HOSPITAL ENCOUNTER (OUTPATIENT)
Dept: INTERVENTIONAL RADIOLOGY/VASCULAR | Age: 69
Discharge: NURSING, REHAB OR LONG-TERM CARE | End: 2024-11-21
Attending: RADIOLOGY | Admitting: RADIOLOGY
Payer: MEDICARE

## 2024-11-21 VITALS
OXYGEN SATURATION: 98 % | HEART RATE: 76 BPM | TEMPERATURE: 98 F | DIASTOLIC BLOOD PRESSURE: 74 MMHG | SYSTOLIC BLOOD PRESSURE: 153 MMHG | RESPIRATION RATE: 18 BRPM | BODY MASS INDEX: 22.72 KG/M2 | WEIGHT: 115.74 LBS | HEIGHT: 60 IN

## 2024-11-21 DIAGNOSIS — N18.6 ESRD (END STAGE RENAL DISEASE) (HCC): Primary | ICD-10-CM

## 2024-11-21 LAB — INR PPP: 0.94 (ref 0.85–1.13)

## 2024-11-21 PROCEDURE — 36558 INSERT TUNNELED CV CATH: CPT

## 2024-11-21 PROCEDURE — C1881 DIALYSIS ACCESS SYSTEM: HCPCS

## 2024-11-21 PROCEDURE — 7100000011 HC PHASE II RECOVERY - ADDTL 15 MIN: Performed by: RADIOLOGY

## 2024-11-21 PROCEDURE — 2580000003 HC RX 258: Performed by: RADIOLOGY

## 2024-11-21 PROCEDURE — 7100000010 HC PHASE II RECOVERY - FIRST 15 MIN: Performed by: RADIOLOGY

## 2024-11-21 PROCEDURE — 2709999900 IR FLUORO GUIDED CVA DEVICE PLMT/REPLACE/REMOVAL

## 2024-11-21 PROCEDURE — 6360000002 HC RX W HCPCS: Performed by: RADIOLOGY

## 2024-11-21 PROCEDURE — 36415 COLL VENOUS BLD VENIPUNCTURE: CPT

## 2024-11-21 PROCEDURE — 36589 REMOVAL TUNNELED CV CATH: CPT

## 2024-11-21 PROCEDURE — 76937 US GUIDE VASCULAR ACCESS: CPT

## 2024-11-21 PROCEDURE — 77001 FLUOROGUIDE FOR VEIN DEVICE: CPT

## 2024-11-21 PROCEDURE — 85610 PROTHROMBIN TIME: CPT

## 2024-11-21 RX ORDER — MIDAZOLAM HYDROCHLORIDE 1 MG/ML
1 INJECTION, SOLUTION INTRAMUSCULAR; INTRAVENOUS ONCE
Status: DISCONTINUED | OUTPATIENT
Start: 2024-11-21 | End: 2024-11-21 | Stop reason: HOSPADM

## 2024-11-21 RX ORDER — MIDAZOLAM HYDROCHLORIDE 1 MG/ML
INJECTION, SOLUTION INTRAMUSCULAR; INTRAVENOUS PRN
Status: COMPLETED | OUTPATIENT
Start: 2024-11-21 | End: 2024-11-21

## 2024-11-21 RX ORDER — SODIUM CHLORIDE 450 MG/100ML
INJECTION, SOLUTION INTRAVENOUS CONTINUOUS
Status: DISCONTINUED | OUTPATIENT
Start: 2024-11-21 | End: 2024-11-21 | Stop reason: HOSPADM

## 2024-11-21 RX ORDER — CLINDAMYCIN PHOSPHATE 900 MG/50ML
900 INJECTION, SOLUTION INTRAVENOUS
Status: COMPLETED | OUTPATIENT
Start: 2024-11-21 | End: 2024-11-21

## 2024-11-21 RX ADMIN — MIDAZOLAM 0.5 MG: 1 INJECTION INTRAMUSCULAR; INTRAVENOUS at 14:28

## 2024-11-21 RX ADMIN — HYDROMORPHONE HYDROCHLORIDE 0.5 MG: 1 INJECTION, SOLUTION INTRAMUSCULAR; INTRAVENOUS; SUBCUTANEOUS at 14:28

## 2024-11-21 RX ADMIN — SODIUM CHLORIDE: 4.5 INJECTION, SOLUTION INTRAVENOUS at 12:15

## 2024-11-21 RX ADMIN — MIDAZOLAM 0.5 MG: 1 INJECTION INTRAMUSCULAR; INTRAVENOUS at 14:38

## 2024-11-21 RX ADMIN — CLINDAMYCIN PHOSPHATE 900 MG: 900 INJECTION, SOLUTION INTRAVENOUS at 12:32

## 2024-11-21 NOTE — PROGRESS NOTES
Pharmacy Pre-Op Antibiotic Dose Adjustment    Deloris Watts is a 69 y.o. female scheduled for surgery. Pharmacy will adjust the following pre-op antibiotic per P&T approved policy: clindamycin    Weight:  Wt Readings from Last 1 Encounters:   09/27/24 49 kg (108 lb)     There is no height or weight on file to calculate BMI.    Plan:   Pre-op antibiotic adjustment: increase clindamycin to 900 mg

## 2024-11-21 NOTE — FLOWSHEET NOTE
11/21/24 1615   Handoff   Communication Given Transfer Handoff   Handoff Given To Lorna BILL [facility]   Handoff Received From Jasmine BILL 2E   Handoff Communication Telephone   Time Handoff Given 1615     Report called to Lorna. Instructed that the old dialysis catheter site could have dressing changed if needed but the new site could only be reinforced if necessary.  Dialysis nurse will change dressing as needed during dialysis.

## 2024-11-21 NOTE — FLOWSHEET NOTE
11/21/24 1546   Fall Risk Interventions   Hourly Visual Checks Eyes closed;In bed  (Mimi from IR up to inspect the sites)     Mimi from IR up to inspect the dialysis sites.  She took a picture and is showing to Dr. Newman.  She states that it looks unchanged from earlier.

## 2024-11-21 NOTE — OP NOTE
Department of Radiology  Post Procedure Progress Note      Pre-Procedure Diagnosis:  Renal Failure    Procedure Performed: remove right tunneled dialysis cath , Insert new tunneled Dialysis Catheter left side      Anesthesia: local , versed and dilaudid    Findings: successful    Immediate Complications:  None    Estimated Blood Loss: minimal    SEE DICTATED PROCEDURE NOTE FOR COMPLETE DETAILS.      Tank Newman MD   11/21/2024 2:49 PM

## 2024-11-21 NOTE — PROGRESS NOTES
TRANSFER - OUT REPORT:    Verbal report given to 2E nurse(name) on Deloris Watts being transferred to 2E(unit) for routine progression of patient care       Report consisted of patient's Situation, Background, Assessment and   Recommendations(SBAR).     Information from the following report(s) Nurse Handoff Report, Adult Overview, and MAR was reviewed with the receiving nurse.    Opportunity for questions and clarification was provided.      Patient transported with:   Monitor

## 2024-11-21 NOTE — H&P
Formulation and discussion of sedation / procedure plans, risks, benefits, side effects and alternatives with patient and/or responsible adult completed.    History and Physical reviewed and unchanged.    Electronically signed by Tank Newman MD on 11/21/24 at 1:44 PM EST

## 2024-11-21 NOTE — H&P
Nausea And Vomiting 04/09/2018    Amoxicillin  02/18/2022    Amoxicillin-pot clavulanate Diarrhea 10/17/2014    Other Itching 09/18/2020    Ciprofloxacin  11/23/2012    Sulfa antibiotics Rash and Other (See Comments) 10/03/2014     Additional information:       MEDICATIONS   Coumadin Use Last 5 Days [x]No []Yes  Antiplatelet drug therapy use last 5 days  [x]No []Yes  Other anticoagulant use last 5 days  [x]No []Yes    Current Facility-Administered Medications:     0.45 % sodium chloride infusion, , IntraVENous, Continuous, Tank Newman MD, Last Rate: 20 mL/hr at 11/21/24 1215, New Bag at 11/21/24 1215    HYDROmorphone (DILAUDID) injection 1 mg, 1 mg, IntraVENous, Once, Tank Newman MD    midazolam (VERSED) injection 1 mg, 1 mg, IntraVENous, Once, Tank Newman MD  Prior to Admission medications    Medication Sig Start Date End Date Taking? Authorizing Provider   doxazosin (CARDURA) 2 MG tablet Take 1 tablet by mouth nightly   Yes Shey New MD   guaiFENesin (MUCINEX) 600 MG extended release tablet Take 2 tablets by mouth 2 times daily   Yes Shey New MD   sevelamer (RENVELA) 800 MG tablet Take 1 tablet by mouth 3 times daily (with meals)   Yes Shey New MD   calcium acetate 667 MG TABS Take by mouth   Yes Shey New MD   folic acid (FOLVITE) 1 MG tablet Take 0.5 tablets by mouth daily 8/9/24  Yes Jax Whatley MD   docusate (COLACE) 50 MG/5ML liquid Take 10 mLs by mouth in the morning, at noon, and at bedtime ; hold if diarrhea 8/8/24  Yes Jax Whatley MD   polyethylene glycol (GLYCOLAX) 17 g packet Take 1 packet by mouth daily ; hold if diarrhea 8/9/24  Yes Jax Whatley MD   senna (SENOKOT) 8.6 MG tablet Take 2 tablets by mouth nightly ; hold if diarrhea 8/8/24  Yes Jax Whatley MD   aspirin 81 MG EC tablet Take 1 tablet by mouth daily   Yes Shey New MD   b complex vitamins capsule Take 1 capsule by mouth daily   Yes Virgen,  MD Shey   lactulose (CHRONULAC) 10 GM/15ML solution Take 30 mLs by mouth as needed   Yes ProviderShey MD   buPROPion (WELLBUTRIN XL) 150 MG extended release tablet Take 1 tablet by mouth every morning 6/19/24  Yes Johana Weldon MD   buPROPion (WELLBUTRIN XL) 300 MG extended release tablet Take 1 tablet by mouth daily 6/19/24  Yes Johana Weldon MD   sertraline (ZOLOFT) 50 MG tablet Take 1 tablet by mouth daily 6/19/24  Yes Johana Weldon MD   primidone (MYSOLINE) 50 MG tablet Take 1 tablet by mouth daily 6/19/24  Yes Johana Weldon MD   clopidogrel (PLAVIX) 75 MG tablet Take 1 tablet by mouth daily 6/19/24  Yes Johana Weldon MD   traZODone (DESYREL) 100 MG tablet Take 1 tablet by mouth nightly For sleep 6/14/24  Yes Jax Whatley MD   carbidopa-levodopa (SINEMET)  MG per tablet Take 1 tablet by mouth 3 times daily At 7 am, 12 noon and 5 pm 6/14/24  Yes Jax Whatley MD   amLODIPine (NORVASC) 10 MG tablet Take 1 tablet by mouth daily 6/15/24  Yes Jax Whatley MD   Probiotic Product (PROBIOTIC DAILY PO) Take 1 tablet by mouth daily 1 gummie daily   Yes ProviderShey MD   bisacodyl (DULCOLAX) 10 MG suppository Place 1 suppository rectally daily as needed for Constipation 1/5/24  Yes Jax Whatley MD   fluticasone (FLONASE) 50 MCG/ACT nasal spray 2 sprays by Each Nostril route daily 1/5/24  Yes Jax Whatley MD   linaclotide (LINZESS) 145 MCG capsule Take 2 capsules by mouth daily as needed (constipation) PRN   Yes ProviderShey MD   blood glucose monitor strips Test 1-2x/day. 3/9/23  Yes Johana Weldon MD   acetaminophen (TYLENOL) 325 MG tablet Take 2 tablets by mouth every 6 hours as needed for Pain    Shey New MD   meclizine (ANTIVERT) 25 MG tablet Take 1 tablet by mouth 3 times daily as needed    ProviderShey MD   megestrol (MEGACE) 40 MG/ML suspension Take 5 mLs by mouth 3 times daily (with  meals) 8/9/24   Jax Whatley MD   ondansetron (ZOFRAN-ODT) 4 MG disintegrating tablet Take 1 tablet by mouth 3 times daily as needed for Nausea or Vomiting 2/28/23   Johana Weldon MD     Additional information:       VITAL SIGNS   Vitals:    11/21/24 1159   BP: (!) 162/75   Pulse: 76   Resp: 14   Temp: 98 °F (36.7 °C)   SpO2: 98%       PHYSICAL:   Heart:  [x]Regular rate and rhythm  []Other:    Lungs:  [x]Clear    []Other:    Abdomen: [x]Soft    []Other:    Mental Status: [x]Alert & Oriented  []Other:      PLANNED PROCEDURE   []Biospy []Arteriogram              []Drainage   []Mediport Insertion  []Fistulogram []IV access       []Vertebroplasty / Augmentation  []IVC filter [x]Dialysis catheter []Biliary drainage  []Other: []CAPD Catheter []Nephrostomy Tube / Stent  SEDATION  Planned agent:[x]Midazolam []Meperidine []Sublimaze [x]Dilaudid []Morphine     []Diazepam  []Other:     ASA Classification:  []1 [x]2 []3 []4 []5  Class 1: A normal healthy patient  Class 2: Pt with mild to moderate systemic disease  Class 3: Severe systemic disease or disturbance  Class 4: Severe systemic disorders that are already life threatening.  Class 5: Moribund pt with little chances of survival, for more than 24 hours.  Mallampati I Airway Classification:   []1 [x]2 []3 []4    [x]Pre-procedure diagnostic studies complete and results available.   Comment:    [x]Previous sedation/anesthesia experiences assessed.   Comment:    [x]The patient is an appropriate candidate to undergo the planned procedure sedation and anesthesia. (Refer to nursing sedation/analgesia documentation record)  [x]Formulation and discussion of sedation/procedure plan, risks, and expectations with patient and/or responsible adult completed.  [x]Patient examined immediately prior to the procedure. (Refer to nursing sedation/analgesia documentation record)    Tank Newman MD, MD  Electronically signed 11/21/2024 at 1:44 PM

## 2024-11-21 NOTE — PROGRESS NOTES
1335 Patient received in IR for tunneled hemodialysis catheter insertion and removal of old tunneled dialysis catheter.  1341 This procedure has been fully reviewed with the patient and written informed consent has been obtained.  1413 Right jugular vein tunneled hemodialysis catheter removed without problems.  Patient tolerated well.  1420 Site sutured and dressed with gauze and opsite.  1424 Left neck prepped for procedure.  1430 Procedure started with .  1442 Line sutured in place, ok to use per Dr. Newman and mesfin.  1448 Procedure completed; patient tolerated well. Dressing to left neck, chg dressing; no bleeding noted.  1455 Patient on bed; comfort ensured.  1506 Patient taken to 2E via bed/transporter. Pt alert and oriented x3; follows commands. Skin pink, warm, and dry. Respirations easy, regular, and nonlabored.

## 2024-11-21 NOTE — FLOWSHEET NOTE
11/21/24 1630   Fall Risk Interventions   Hourly Visual Checks Awake;In bed     Patient awake, depends changed and patient assisted with dressing per 2 nurses. Pivoted to w/c. And Azeem transported patient to [place] per davonte.  Tenzin,  following her home.

## 2024-11-22 NOTE — DISCHARGE INSTRUCTIONS
Ice as needed for pain or discomfort  Dressing will stay on until seen in dialysis, and changed per dialysis staff  Monitor for signs of bleeding, and call dialysis if any signs of bleeding under dressing noted.   Monitor for signs of infection - redness, warmth, pus drainage, fever, chills and call dialysis if any signs of infections noted.   Keep dressing clean/dry/intact - do not get site wet.  Dialysis will remove.

## 2024-11-29 ENCOUNTER — HOSPITAL ENCOUNTER (OUTPATIENT)
Dept: INTERVENTIONAL RADIOLOGY/VASCULAR | Age: 69
Discharge: HOME | End: 2024-11-29
Payer: MEDICARE

## 2024-11-29 DIAGNOSIS — N18.6 ESRD (END STAGE RENAL DISEASE) (HCC): ICD-10-CM

## 2024-11-29 PROCEDURE — 2709999900 IR FLUOROSCOPY LESS THAN 1 HOUR

## 2024-11-29 PROCEDURE — 76000 FLUOROSCOPY <1 HR PHYS/QHP: CPT

## 2024-11-29 RX ORDER — IOPAMIDOL 612 MG/ML
INJECTION, SOLUTION INTRAVASCULAR PRN
Status: DISCONTINUED | OUTPATIENT
Start: 2024-11-29 | End: 2024-11-30 | Stop reason: HOSPADM

## 2024-11-29 RX ADMIN — IOPAMIDOL 10 ML: 612 INJECTION, SOLUTION INTRAVASCULAR at 13:33

## 2024-11-29 NOTE — PROGRESS NOTES
Pt in IR for evaluation of poorly functioning tunneled HD catheter. Catheter is flowing well under fluro exam. Dr Robbins suggests to schedule for an exchange next week for a shorter catheter if it continues to not run adequately.

## 2024-12-20 ENCOUNTER — APPOINTMENT (OUTPATIENT)
Dept: MRI IMAGING | Age: 69
DRG: 177 | End: 2024-12-20
Payer: MEDICARE

## 2024-12-20 ENCOUNTER — APPOINTMENT (OUTPATIENT)
Dept: CT IMAGING | Age: 69
DRG: 177 | End: 2024-12-20
Payer: MEDICARE

## 2024-12-20 ENCOUNTER — HOSPITAL ENCOUNTER (INPATIENT)
Age: 69
LOS: 3 days | Discharge: NURSING, REHAB OR LONG-TERM CARE | DRG: 177 | End: 2024-12-23
Attending: EMERGENCY MEDICINE | Admitting: STUDENT IN AN ORGANIZED HEALTH CARE EDUCATION/TRAINING PROGRAM
Payer: MEDICARE

## 2024-12-20 ENCOUNTER — APPOINTMENT (OUTPATIENT)
Dept: GENERAL RADIOLOGY | Age: 69
DRG: 177 | End: 2024-12-20
Payer: MEDICARE

## 2024-12-20 DIAGNOSIS — R79.89 ELEVATED TROPONIN: ICD-10-CM

## 2024-12-20 DIAGNOSIS — J18.9 PNEUMONIA DUE TO INFECTIOUS ORGANISM, UNSPECIFIED LATERALITY, UNSPECIFIED PART OF LUNG: ICD-10-CM

## 2024-12-20 DIAGNOSIS — R41.82 ALTERED MENTAL STATUS, UNSPECIFIED ALTERED MENTAL STATUS TYPE: Primary | ICD-10-CM

## 2024-12-20 DIAGNOSIS — U07.1 COVID-19: ICD-10-CM

## 2024-12-20 DIAGNOSIS — E83.52 HYPERCALCEMIA: ICD-10-CM

## 2024-12-20 DIAGNOSIS — N39.0 URINARY TRACT INFECTION WITHOUT HEMATURIA, SITE UNSPECIFIED: ICD-10-CM

## 2024-12-20 DIAGNOSIS — R29.90 STROKE-LIKE SYMPTOMS: ICD-10-CM

## 2024-12-20 LAB
ALBUMIN SERPL BCG-MCNC: 3.5 G/DL (ref 3.5–5.1)
ALP SERPL-CCNC: 143 U/L (ref 38–126)
ALT SERPL W/O P-5'-P-CCNC: < 5 U/L (ref 11–66)
AMMONIA PLAS-MCNC: 18 UMOL/L (ref 11–60)
ANION GAP SERPL CALC-SCNC: 18 MEQ/L (ref 8–16)
AST SERPL-CCNC: 13 U/L (ref 5–40)
BACTERIA URNS QL MICRO: ABNORMAL /HPF
BASOPHILS ABSOLUTE: 0.1 THOU/MM3 (ref 0–0.1)
BASOPHILS NFR BLD AUTO: 1.2 %
BILIRUB SERPL-MCNC: 0.3 MG/DL (ref 0.3–1.2)
BILIRUB UR QL STRIP.AUTO: NEGATIVE
BUN SERPL-MCNC: 59 MG/DL (ref 7–22)
CALCIUM SERPL-MCNC: 9 MG/DL (ref 8.5–10.5)
CASTS #/AREA URNS LPF: ABNORMAL /LPF
CASTS 2: ABNORMAL /LPF
CHARACTER UR: CLEAR
CHLORIDE SERPL-SCNC: 101 MEQ/L (ref 98–111)
CO2 SERPL-SCNC: 18 MEQ/L (ref 23–33)
COLOR, UA: YELLOW
CREAT SERPL-MCNC: 7.1 MG/DL (ref 0.4–1.2)
CRYSTALS URNS MICRO: ABNORMAL
DEPRECATED RDW RBC AUTO: 55.9 FL (ref 35–45)
EKG ATRIAL RATE: 83 BPM
EKG P AXIS: 95 DEGREES
EKG P-R INTERVAL: 144 MS
EKG Q-T INTERVAL: 378 MS
EKG QRS DURATION: 116 MS
EKG QTC CALCULATION (BAZETT): 444 MS
EKG R AXIS: 101 DEGREES
EKG T AXIS: 81 DEGREES
EKG VENTRICULAR RATE: 83 BPM
EOSINOPHIL NFR BLD AUTO: 2.4 %
EOSINOPHILS ABSOLUTE: 0.2 THOU/MM3 (ref 0–0.4)
EPITHELIAL CELLS, UA: ABNORMAL /HPF
ERYTHROCYTE [DISTWIDTH] IN BLOOD BY AUTOMATED COUNT: 14.8 % (ref 11.5–14.5)
FLUAV RNA RESP QL NAA+PROBE: NOT DETECTED
FLUBV RNA RESP QL NAA+PROBE: NOT DETECTED
GFR SERPL CREATININE-BSD FRML MDRD: 6 ML/MIN/1.73M2
GLUCOSE BLD-MCNC: 290 MG/DL
GLUCOSE SERPL-MCNC: 186 MG/DL (ref 70–108)
GLUCOSE UR QL STRIP.AUTO: 250 MG/DL
HCT VFR BLD AUTO: 30.4 % (ref 37–47)
HGB BLD-MCNC: 9 GM/DL (ref 12–16)
HGB UR QL STRIP.AUTO: ABNORMAL
IMM GRANULOCYTES # BLD AUTO: 0.3 THOU/MM3 (ref 0–0.07)
IMM GRANULOCYTES NFR BLD AUTO: 3 %
INR PPP: 0.94 (ref 0.85–1.13)
KETONES UR QL STRIP.AUTO: NEGATIVE
LYMPHOCYTES ABSOLUTE: 0.9 THOU/MM3 (ref 1–4.8)
LYMPHOCYTES NFR BLD AUTO: 9.3 %
MCH RBC QN AUTO: 30.2 PG (ref 26–33)
MCHC RBC AUTO-ENTMCNC: 29.6 GM/DL (ref 32.2–35.5)
MCV RBC AUTO: 102 FL (ref 81–99)
MISCELLANEOUS 2: ABNORMAL
MONOCYTES ABSOLUTE: 1 THOU/MM3 (ref 0.4–1.3)
MONOCYTES NFR BLD AUTO: 9.5 %
NEUTROPHILS ABSOLUTE: 7.5 THOU/MM3 (ref 1.8–7.7)
NEUTROPHILS NFR BLD AUTO: 74.6 %
NITRITE UR QL STRIP: NEGATIVE
NRBC BLD AUTO-RTO: 0 /100 WBC
OSMOLALITY SERPL CALC.SUM OF ELEC: 295.2 MOSMOL/KG (ref 275–300)
PH UR STRIP.AUTO: 8 [PH] (ref 5–9)
PLATELET # BLD AUTO: 362 THOU/MM3 (ref 130–400)
PMV BLD AUTO: 9.8 FL (ref 9.4–12.4)
POC CREATININE WHOLE BLOOD: 8.1 MG/DL (ref 0.5–1.2)
POTASSIUM SERPL-SCNC: 4.8 MEQ/L (ref 3.5–5.2)
PROT SERPL-MCNC: 6.6 G/DL (ref 6.1–8)
PROT UR STRIP.AUTO-MCNC: 100 MG/DL
RBC # BLD AUTO: 2.98 MILL/MM3 (ref 4.2–5.4)
RBC URINE: ABNORMAL /HPF
RENAL EPI CELLS #/AREA URNS HPF: ABNORMAL /[HPF]
SARS-COV-2 RNA RESP QL NAA+PROBE: DETECTED
SODIUM SERPL-SCNC: 137 MEQ/L (ref 135–145)
SP GR UR REFRACT.AUTO: 1.02 (ref 1–1.03)
TROPONIN, HIGH SENSITIVITY: 111 NG/L (ref 0–12)
TROPONIN, HIGH SENSITIVITY: 125 NG/L (ref 0–12)
UROBILINOGEN, URINE: 0.2 EU/DL (ref 0–1)
WBC # BLD AUTO: 10 THOU/MM3 (ref 4.8–10.8)
WBC #/AREA URNS HPF: ABNORMAL /HPF
WBC #/AREA URNS HPF: ABNORMAL /[HPF]
YEAST LIKE FUNGI URNS QL MICRO: ABNORMAL

## 2024-12-20 PROCEDURE — 93005 ELECTROCARDIOGRAM TRACING: CPT | Performed by: EMERGENCY MEDICINE

## 2024-12-20 PROCEDURE — 4A03X5D MEASUREMENT OF ARTERIAL FLOW, INTRACRANIAL, EXTERNAL APPROACH: ICD-10-PCS | Performed by: INTERNAL MEDICINE

## 2024-12-20 PROCEDURE — 81001 URINALYSIS AUTO W/SCOPE: CPT

## 2024-12-20 PROCEDURE — 85025 COMPLETE CBC W/AUTO DIFF WBC: CPT

## 2024-12-20 PROCEDURE — 82140 ASSAY OF AMMONIA: CPT

## 2024-12-20 PROCEDURE — 2500000003 HC RX 250 WO HCPCS

## 2024-12-20 PROCEDURE — 70551 MRI BRAIN STEM W/O DYE: CPT

## 2024-12-20 PROCEDURE — 80053 COMPREHEN METABOLIC PANEL: CPT

## 2024-12-20 PROCEDURE — 96375 TX/PRO/DX INJ NEW DRUG ADDON: CPT

## 2024-12-20 PROCEDURE — 82565 ASSAY OF CREATININE: CPT

## 2024-12-20 PROCEDURE — 99233 SBSQ HOSP IP/OBS HIGH 50: CPT | Performed by: NURSE PRACTITIONER

## 2024-12-20 PROCEDURE — 2500000003 HC RX 250 WO HCPCS: Performed by: PHYSICIAN ASSISTANT

## 2024-12-20 PROCEDURE — 36415 COLL VENOUS BLD VENIPUNCTURE: CPT

## 2024-12-20 PROCEDURE — 87636 SARSCOV2 & INF A&B AMP PRB: CPT

## 2024-12-20 PROCEDURE — 87186 SC STD MICRODIL/AGAR DIL: CPT

## 2024-12-20 PROCEDURE — 2580000003 HC RX 258

## 2024-12-20 PROCEDURE — 70450 CT HEAD/BRAIN W/O DYE: CPT

## 2024-12-20 PROCEDURE — 87077 CULTURE AEROBIC IDENTIFY: CPT

## 2024-12-20 PROCEDURE — 6360000004 HC RX CONTRAST MEDICATION: Performed by: EMERGENCY MEDICINE

## 2024-12-20 PROCEDURE — 71045 X-RAY EXAM CHEST 1 VIEW: CPT

## 2024-12-20 PROCEDURE — 70498 CT ANGIOGRAPHY NECK: CPT

## 2024-12-20 PROCEDURE — 1200000003 HC TELEMETRY R&B

## 2024-12-20 PROCEDURE — 99285 EMERGENCY DEPT VISIT HI MDM: CPT

## 2024-12-20 PROCEDURE — 87040 BLOOD CULTURE FOR BACTERIA: CPT

## 2024-12-20 PROCEDURE — 93010 ELECTROCARDIOGRAM REPORT: CPT | Performed by: INTERNAL MEDICINE

## 2024-12-20 PROCEDURE — 85610 PROTHROMBIN TIME: CPT

## 2024-12-20 PROCEDURE — 87086 URINE CULTURE/COLONY COUNT: CPT

## 2024-12-20 PROCEDURE — 70496 CT ANGIOGRAPHY HEAD: CPT

## 2024-12-20 PROCEDURE — 6360000002 HC RX W HCPCS

## 2024-12-20 PROCEDURE — 96365 THER/PROPH/DIAG IV INF INIT: CPT

## 2024-12-20 PROCEDURE — 84484 ASSAY OF TROPONIN QUANT: CPT

## 2024-12-20 PROCEDURE — 83036 HEMOGLOBIN GLYCOSYLATED A1C: CPT

## 2024-12-20 PROCEDURE — 6360000002 HC RX W HCPCS: Performed by: PHYSICIAN ASSISTANT

## 2024-12-20 RX ORDER — MEGESTROL ACETATE 40 MG/ML
200 SUSPENSION ORAL
Status: DISCONTINUED | OUTPATIENT
Start: 2024-12-21 | End: 2024-12-20

## 2024-12-20 RX ORDER — DOXAZOSIN 2 MG/1
2 TABLET ORAL NIGHTLY
Status: DISCONTINUED | OUTPATIENT
Start: 2024-12-20 | End: 2024-12-20

## 2024-12-20 RX ORDER — LIDOCAINE 4 G/G
1 PATCH TOPICAL DAILY
COMMUNITY

## 2024-12-20 RX ORDER — CETIRIZINE HYDROCHLORIDE 5 MG/1
5 TABLET ORAL DAILY
Status: ON HOLD | COMMUNITY
End: 2024-12-23 | Stop reason: HOSPADM

## 2024-12-20 RX ORDER — ACETAMINOPHEN 325 MG/1
650 TABLET ORAL EVERY 6 HOURS PRN
Status: DISCONTINUED | OUTPATIENT
Start: 2024-12-20 | End: 2024-12-23 | Stop reason: HOSPADM

## 2024-12-20 RX ORDER — MIRTAZAPINE 15 MG/1
15 TABLET, FILM COATED ORAL NIGHTLY
COMMUNITY

## 2024-12-20 RX ORDER — TRAZODONE HYDROCHLORIDE 100 MG/1
100 TABLET ORAL NIGHTLY
Status: DISCONTINUED | OUTPATIENT
Start: 2024-12-20 | End: 2024-12-23 | Stop reason: HOSPADM

## 2024-12-20 RX ORDER — IOPAMIDOL 755 MG/ML
80 INJECTION, SOLUTION INTRAVASCULAR
Status: COMPLETED | OUTPATIENT
Start: 2024-12-20 | End: 2024-12-20

## 2024-12-20 RX ORDER — CLONIDINE HYDROCHLORIDE 0.1 MG/1
0.1 TABLET ORAL 3 TIMES DAILY
Status: ON HOLD | COMMUNITY
End: 2024-12-23 | Stop reason: HOSPADM

## 2024-12-20 RX ORDER — BUPROPION HYDROCHLORIDE 150 MG/1
150 TABLET ORAL EVERY MORNING
Status: DISCONTINUED | OUTPATIENT
Start: 2024-12-21 | End: 2024-12-21

## 2024-12-20 RX ORDER — ALBUTEROL SULFATE 0.83 MG/ML
2.5 SOLUTION RESPIRATORY (INHALATION) EVERY 4 HOURS PRN
COMMUNITY

## 2024-12-20 RX ORDER — AMLODIPINE BESYLATE 10 MG/1
10 TABLET ORAL DAILY
Status: DISCONTINUED | OUTPATIENT
Start: 2024-12-21 | End: 2024-12-23 | Stop reason: HOSPADM

## 2024-12-20 RX ORDER — SEVELAMER CARBONATE 800 MG/1
800 TABLET, FILM COATED ORAL
Status: DISCONTINUED | OUTPATIENT
Start: 2024-12-21 | End: 2024-12-23 | Stop reason: HOSPADM

## 2024-12-20 RX ORDER — POLYETHYLENE GLYCOL 3350 17 G/17G
17 POWDER, FOR SOLUTION ORAL DAILY
Status: DISCONTINUED | OUTPATIENT
Start: 2024-12-20 | End: 2024-12-23 | Stop reason: HOSPADM

## 2024-12-20 RX ORDER — HEPARIN SODIUM 5000 [USP'U]/ML
5000 INJECTION, SOLUTION INTRAVENOUS; SUBCUTANEOUS EVERY 8 HOURS SCHEDULED
Status: DISCONTINUED | OUTPATIENT
Start: 2024-12-20 | End: 2024-12-23 | Stop reason: HOSPADM

## 2024-12-20 RX ORDER — PRIMIDONE 50 MG/1
50 TABLET ORAL DAILY
Status: DISCONTINUED | OUTPATIENT
Start: 2024-12-21 | End: 2024-12-21

## 2024-12-20 RX ORDER — ASPIRIN 81 MG/1
81 TABLET ORAL DAILY
Status: DISCONTINUED | OUTPATIENT
Start: 2024-12-21 | End: 2024-12-23 | Stop reason: HOSPADM

## 2024-12-20 RX ORDER — SODIUM CHLORIDE 0.9 % (FLUSH) 0.9 %
5-40 SYRINGE (ML) INJECTION PRN
Status: DISCONTINUED | OUTPATIENT
Start: 2024-12-20 | End: 2024-12-23 | Stop reason: HOSPADM

## 2024-12-20 RX ORDER — ONDANSETRON 4 MG/1
4 TABLET, ORALLY DISINTEGRATING ORAL EVERY 8 HOURS PRN
Status: DISCONTINUED | OUTPATIENT
Start: 2024-12-20 | End: 2024-12-23 | Stop reason: HOSPADM

## 2024-12-20 RX ORDER — BUPROPION HYDROCHLORIDE 300 MG/1
300 TABLET ORAL DAILY
Status: DISCONTINUED | OUTPATIENT
Start: 2024-12-21 | End: 2024-12-23 | Stop reason: HOSPADM

## 2024-12-20 RX ORDER — CLOPIDOGREL BISULFATE 75 MG/1
75 TABLET ORAL DAILY
Status: DISCONTINUED | OUTPATIENT
Start: 2024-12-21 | End: 2024-12-23 | Stop reason: HOSPADM

## 2024-12-20 RX ORDER — CARBIDOPA/LEVODOPA 10MG-100MG
1 TABLET ORAL 3 TIMES DAILY
Status: DISCONTINUED | OUTPATIENT
Start: 2024-12-21 | End: 2024-12-23 | Stop reason: HOSPADM

## 2024-12-20 RX ORDER — POLYETHYLENE GLYCOL 3350 17 G/17G
17 POWDER, FOR SOLUTION ORAL DAILY PRN
Status: DISCONTINUED | OUTPATIENT
Start: 2024-12-20 | End: 2024-12-23 | Stop reason: HOSPADM

## 2024-12-20 RX ORDER — SODIUM CHLORIDE 9 MG/ML
INJECTION, SOLUTION INTRAVENOUS PRN
Status: DISCONTINUED | OUTPATIENT
Start: 2024-12-20 | End: 2024-12-23 | Stop reason: HOSPADM

## 2024-12-20 RX ORDER — ONDANSETRON 2 MG/ML
4 INJECTION INTRAMUSCULAR; INTRAVENOUS EVERY 6 HOURS PRN
Status: DISCONTINUED | OUTPATIENT
Start: 2024-12-20 | End: 2024-12-23 | Stop reason: HOSPADM

## 2024-12-20 RX ORDER — SODIUM CHLORIDE 0.9 % (FLUSH) 0.9 %
5-40 SYRINGE (ML) INJECTION EVERY 12 HOURS SCHEDULED
Status: DISCONTINUED | OUTPATIENT
Start: 2024-12-20 | End: 2024-12-23 | Stop reason: HOSPADM

## 2024-12-20 RX ORDER — SENNOSIDES A AND B 8.6 MG/1
2 TABLET, FILM COATED ORAL NIGHTLY
Status: DISCONTINUED | OUTPATIENT
Start: 2024-12-20 | End: 2024-12-23 | Stop reason: HOSPADM

## 2024-12-20 RX ORDER — BISACODYL 10 MG
10 SUPPOSITORY, RECTAL RECTAL DAILY PRN
Status: DISCONTINUED | OUTPATIENT
Start: 2024-12-20 | End: 2024-12-23 | Stop reason: HOSPADM

## 2024-12-20 RX ORDER — ACETAMINOPHEN 650 MG/1
650 SUPPOSITORY RECTAL EVERY 6 HOURS PRN
Status: DISCONTINUED | OUTPATIENT
Start: 2024-12-20 | End: 2024-12-23 | Stop reason: HOSPADM

## 2024-12-20 RX ADMIN — WATER 1000 MG: 1 INJECTION INTRAMUSCULAR; INTRAVENOUS; SUBCUTANEOUS at 17:35

## 2024-12-20 RX ADMIN — IOPAMIDOL 80 ML: 755 INJECTION, SOLUTION INTRAVENOUS at 15:07

## 2024-12-20 RX ADMIN — AZITHROMYCIN MONOHYDRATE 500 MG: 500 INJECTION, POWDER, LYOPHILIZED, FOR SOLUTION INTRAVENOUS at 17:40

## 2024-12-20 RX ADMIN — SODIUM CHLORIDE, PRESERVATIVE FREE 10 ML: 5 INJECTION INTRAVENOUS at 20:00

## 2024-12-20 RX ADMIN — HEPARIN SODIUM 5000 UNITS: 5000 INJECTION INTRAVENOUS; SUBCUTANEOUS at 23:18

## 2024-12-20 ASSESSMENT — PAIN - FUNCTIONAL ASSESSMENT: PAIN_FUNCTIONAL_ASSESSMENT: NONE - DENIES PAIN

## 2024-12-20 NOTE — ED NOTES
Pt back to ED at this time.  at bedside.  states, \"she has not been acting right since Wednesday. [facility] Normally she has short term memory loss, slurred speech, and one of her legs is not right. I don't know, I've got a cold and can't hardly think.\" Pt  also reports that she missed dialysis on Wednesday because she was throwing up mucus and combative.  states, \"her stroke symptoms are not worse than they normally are. She is normally confused but in the last week she has been talking out of her head.\"

## 2024-12-20 NOTE — ED NOTES
Pt back to ED from MRI at this time. Pt in bed, eyes open, respirations even and unlabored. Vital signs reassessed, no needs voiced.    02-Jun-2021 22:17

## 2024-12-20 NOTE — CONSULTS
Neurology Stroke Alert Note    Date:12/20/2024       Room:[address] [place] [address]  Patient Name:Deloris Watts     YOB: 1955     Age:69 y.o.    Requesting Physician: Gareth Sorto MD     Reason for Consult:  Evaluate for stroke alert    Subjective       HPI: Deloris Watts who is a 69 y.o. 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 The patients last known well was at 2230, 12/19/2024. A stroke alert was activated at 1451. Initial blood pressure was 151/83. Glucose 290. Initial NIH of 4 was given for inability to state her age, right facial droop with right tongue deviation, LLE drift and dysarthria. Initial CY head was negative. CTA head and neck was negative for occult stenosis or large vessel occlusion.  The case was discussed with and the images were reviewed by Dr. Patel.  The patient was deemed not a candidate for TNK due to presenting outside the window of opportunity.     Last known well:  2230, 12/19/2024.  Time of stroke alert:  1451.  Time of neurology arrival:  1452.  Vascular risk factors:  ESRD, HTN, Prior CVA, Age.  Initial glucose: 290 .  Old deficits from prior stroke:  left leg weakness, dysarthria.  INR in ED if anticoagulated:  not applicable.  Initial blood pressure:  151/83.  Initial NIHSS: 4.  Pre-morbid Modified Carolyn Scale:  3.   Time of initial imaging read:  1515.  Thrombolytic administered:  not indicated/contraindicated.  Thrombectomy performed:  not indicated.  Puncture time:  not applicable.       Review of Systems   Review of Systems   Unable to perform ROS: Dementia       Medications   Scheduled Meds:   Continuous Infusions:   PRN Meds:   Medications Prior to Admission:   No current facility-administered medications on file  lower cervical spine. Retention cyst or polyp in the left maxillary sinus.     1. Negative CTA of the head and neck. 2. Diffusion MRI scan of the brain would be helpful for better evaluation in this patient. **This report has been created using voice recognition software. It may contain minor errors which are inherent in voice recognition technology.** Electronically signed by Dr. Lyly Larios    XR CHEST PORTABLE    Result Date: 12/20/2024  PROCEDURE: XR CHEST PORTABLE CLINICAL INFORMATION: Acute CVA COMPARISON: August 5, 2024 TECHNIQUE: A single mobile view of the chest was obtained.     1. Normal heart size. Prior anterior cervical fusion. Prior surgery proximal right humerus. Left jugular dialysis catheter, tip in right atrium. 2. No pneumothorax is seen. No effusion. Very mild retrocardiac atelectasis/pneumonia. **This report has been created using voice recognition software.  It may contain minor errors which are inherent in voice recognition technology.** Electronically signed by Dr. Tank Newman    CT HEAD WO CONTRAST    Result Date: 12/20/2024  PROCEDURE: CT CODE NEURO HEAD WO CONTRAST CLINICAL INFORMATION: 69-year-old female with strokelike symptoms. COMPARISON: CT 9/27/2024 TECHNIQUE: Noncontrast 5 mm axial images were obtained through the brain. All CT scans at this facility use dose modulation, iterative reconstruction, and/or weight-based dosing when appropriate to reduce radiation dose to as low as reasonably achievable. FINDINGS: There is encephalomalacia in the right frontal lobe likely representing the sequelae of a previous infarct. There is generalized prominence of the sulci and gyri consistent with age-related volume loss. There is no hemorrhage. There are no intra-or extra-axial collections.  There is no hydrocephalus, midline shift or mass effect.  The gray-white matter differentiation is preserved. There is hypoattenuation in the periventricular white matter consistent with chronic

## 2024-12-20 NOTE — ED PROVIDER NOTES
[facility] EMERGENCY DEPT  EMERGENCY DEPARTMENT ENCOUNTER          Pt Name: Deloris Watts  MRN: [id number]  Birthdate 1955  Date of evaluation: 12/20/2024  Physician: Mikayla Landeros MD  Supervising Attending Physician: Ferdinand Medel DO       CHIEF COMPLAINT     No chief complaint on file.        HISTORY OF PRESENT ILLNESS    HPI  Deloris Watts is a 69 y.o. female past medical history of CVA x 3, Parkinson's disease, dementia, hypertension end-stage renal disease (M WF), CHF and hyperlipidemia who presents to the emergency department  from dialysis clinic via EMS  for evaluation of altered mental status and stroke-like symptoms.  Unclear last known well per EMS.  Per EMS the patient was reported to them as having weakness on her left side but is unknown if this is chronic or not.  EMS relates the patient did miss dialysis on Wednesday of this week and the dialysis clinic elected to call them as the patient was altered past her baseline and they had concern for stroke.  History is limited secondary to altered mental status.      PAST MEDICAL AND SURGICAL HISTORY     Past Medical History:   Diagnosis Date    Allergic rhinitis     Anemia in CKD (chronic kidney disease)     Anxiety     CHF (congestive heart failure) (HCC)     Closed fracture of right proximal humerus 09/18/2021    ORIF    Closed right ankle fracture     In 1990s; treated with casting    CVA (cerebral infarction)     in 1990s ; with left-sided weakness    Depression     Fibromyalgia     in 1990s    Headache(784.0)     Hemiplegia and hemiparesis following cerebral infarction affecting left non-dominant side (HCC)     in 1990s    Hydronephrosis 09/01/2023    Urogenital Implants, calculus of ureter, UTI    Hyperlipidemia     Hypertension     in 1980s    Irritable bowel syndrome     in 1990s    Kidney failure     Chronic Kidney Disease, Renal Dialysis started in 1/2023    Osteoporosis 2019    Unspecified cerebral artery 
  [place]  EMERGENCY MEDICINE ATTENDING ATTESTATION      Evaluation of Deloris Watts.   Case discussed and care plan developed with resident physician.   I agree with the resident physician documentation and plan as documented by her, except if my documentation differs.   Patient seen, interviewed and examined by me with resident immediately upon patient's arrival.  I reviewed the medical, surgical, family and social history, medications and allergies.   I have reviewed and interpreted all available lab, radiology and ekg results available at the moment.  I have reviewed the nursing documentation.       Please see the resident physician completed note for final disposition except as documented on this attestation.   I have reviewed and interpreted all available lab, radiology and ekg results available at the moment.  Diagnosis, treatment and disposition plans were discussed and agreed upon by patient.   This transcription was electronically signed. It was dictated by use of voice recognition software and electronically transcribed. The transcription may contain errors not detected in proofreading.     I performed direct supervision and was present for the critical portion following procedures: None  Critical care time on this case: See critical care addendum below    CRITICAL CARE ADDENDUM:  This patient was identified as critically ill on my evaluation due to possible to acute stroke, requiring stroke team activation, stat imaging, stat evaluation, in addition to discussions with consultants, evaluation of patient's response to treatment, examination of patient, obtaining history from patient or surrogate, ordering and performing treatments and interventions, ordering and review of laboratory studies, ordering and review of radiographic studies, pulse oximetry, re-evaluation of patient's condition and review of old charts.  There is a high probability of imminent or 
Family

## 2024-12-20 NOTE — ED TRIAGE NOTES
Pt presents to ED due to altered mental status and facial droop. EMS reports that patient was dropped off at dialysis 15 min PTA and her  dropped her off stating, \"she just isn't right today.\" Pt reports that she went to bed at 2230 and woke up at 0500 like this. EMS reports her deficits are \"worse than normal\" and \"she has had 3 strokes.\" EMS reports the deficits being left sided weakness, right sided facial droop, slurred speech which is worse than normal, and paraesthesia. Dr. Sorto and Dr. Landeros at bedside upon arrival. Pt alert to self at this time. Pt failed swallow screen. .

## 2024-12-21 LAB
ANION GAP SERPL CALC-SCNC: 18 MEQ/L (ref 8–16)
BASOPHILS ABSOLUTE: 0.1 THOU/MM3 (ref 0–0.1)
BASOPHILS NFR BLD AUTO: 1.6 %
BUN SERPL-MCNC: 59 MG/DL (ref 7–22)
CALCIUM SERPL-MCNC: 9.3 MG/DL (ref 8.5–10.5)
CHLORIDE SERPL-SCNC: 102 MEQ/L (ref 98–111)
CO2 SERPL-SCNC: 17 MEQ/L (ref 23–33)
CREAT SERPL-MCNC: 7.6 MG/DL (ref 0.4–1.2)
DEPRECATED MEAN GLUCOSE BLD GHB EST-ACNC: 66 MG/DL (ref 70–126)
DEPRECATED RDW RBC AUTO: 55.1 FL (ref 35–45)
EOSINOPHIL NFR BLD AUTO: 1.1 %
EOSINOPHILS ABSOLUTE: 0.1 THOU/MM3 (ref 0–0.4)
ERYTHROCYTE [DISTWIDTH] IN BLOOD BY AUTOMATED COUNT: 14.9 % (ref 11.5–14.5)
FOLATE SERPL-MCNC: > 20 NG/ML (ref 4.8–24.2)
GFR SERPL CREATININE-BSD FRML MDRD: 5 ML/MIN/1.73M2
GLUCOSE SERPL-MCNC: 70 MG/DL (ref 70–108)
HBA1C MFR BLD HPLC: < 4.2 % (ref 4.4–6.4)
HCT VFR BLD AUTO: 30.1 % (ref 37–47)
HGB BLD-MCNC: 9.1 GM/DL (ref 12–16)
IMM GRANULOCYTES # BLD AUTO: 0.61 THOU/MM3 (ref 0–0.07)
IMM GRANULOCYTES NFR BLD AUTO: 6.6 %
LYMPHOCYTES ABSOLUTE: 1.2 THOU/MM3 (ref 1–4.8)
LYMPHOCYTES NFR BLD AUTO: 13.2 %
MCH RBC QN AUTO: 30.6 PG (ref 26–33)
MCHC RBC AUTO-ENTMCNC: 30.2 GM/DL (ref 32.2–35.5)
MCV RBC AUTO: 101.3 FL (ref 81–99)
MONOCYTES ABSOLUTE: 0.5 THOU/MM3 (ref 0.4–1.3)
MONOCYTES NFR BLD AUTO: 5.9 %
NEUTROPHILS ABSOLUTE: 6.6 THOU/MM3 (ref 1.8–7.7)
NEUTROPHILS NFR BLD AUTO: 71.6 %
NRBC BLD AUTO-RTO: 0 /100 WBC
PLATELET # BLD AUTO: 380 THOU/MM3 (ref 130–400)
PLATELET BLD QL SMEAR: ADEQUATE
PMV BLD AUTO: 9.8 FL (ref 9.4–12.4)
POTASSIUM SERPL-SCNC: 4.9 MEQ/L (ref 3.5–5.2)
RBC # BLD AUTO: 2.97 MILL/MM3 (ref 4.2–5.4)
SCAN OF BLOOD SMEAR: NORMAL
SODIUM SERPL-SCNC: 137 MEQ/L (ref 135–145)
T4 FREE SERPL-MCNC: 1.18 NG/DL (ref 0.93–1.68)
TROPONIN, HIGH SENSITIVITY: 122 NG/L (ref 0–12)
TSH SERPL DL<=0.005 MIU/L-ACNC: 5.39 UIU/ML (ref 0.4–4.2)
VARIANT LYMPHS BLD QL SMEAR: ABNORMAL %
VIT B12 SERPL-MCNC: 1243 PG/ML (ref 211–911)
WBC # BLD AUTO: 9.2 THOU/MM3 (ref 4.8–10.8)

## 2024-12-21 PROCEDURE — 80048 BASIC METABOLIC PNL TOTAL CA: CPT

## 2024-12-21 PROCEDURE — 2500000003 HC RX 250 WO HCPCS: Performed by: PHYSICIAN ASSISTANT

## 2024-12-21 PROCEDURE — 92610 EVALUATE SWALLOWING FUNCTION: CPT

## 2024-12-21 PROCEDURE — 6370000000 HC RX 637 (ALT 250 FOR IP)

## 2024-12-21 PROCEDURE — 1200000003 HC TELEMETRY R&B

## 2024-12-21 PROCEDURE — 90935 HEMODIALYSIS ONE EVALUATION: CPT

## 2024-12-21 PROCEDURE — 36415 COLL VENOUS BLD VENIPUNCTURE: CPT

## 2024-12-21 PROCEDURE — 97530 THERAPEUTIC ACTIVITIES: CPT

## 2024-12-21 PROCEDURE — 97162 PT EVAL MOD COMPLEX 30 MIN: CPT

## 2024-12-21 PROCEDURE — 99233 SBSQ HOSP IP/OBS HIGH 50: CPT | Performed by: STUDENT IN AN ORGANIZED HEALTH CARE EDUCATION/TRAINING PROGRAM

## 2024-12-21 PROCEDURE — 82607 VITAMIN B-12: CPT

## 2024-12-21 PROCEDURE — 6370000000 HC RX 637 (ALT 250 FOR IP): Performed by: STUDENT IN AN ORGANIZED HEALTH CARE EDUCATION/TRAINING PROGRAM

## 2024-12-21 PROCEDURE — 90935 HEMODIALYSIS ONE EVALUATION: CPT | Performed by: INTERNAL MEDICINE

## 2024-12-21 PROCEDURE — 6360000002 HC RX W HCPCS: Performed by: PHYSICIAN ASSISTANT

## 2024-12-21 PROCEDURE — 82746 ASSAY OF FOLIC ACID SERUM: CPT

## 2024-12-21 PROCEDURE — 84439 ASSAY OF FREE THYROXINE: CPT

## 2024-12-21 PROCEDURE — 84484 ASSAY OF TROPONIN QUANT: CPT

## 2024-12-21 PROCEDURE — 99222 1ST HOSP IP/OBS MODERATE 55: CPT | Performed by: INTERNAL MEDICINE

## 2024-12-21 PROCEDURE — 85025 COMPLETE CBC W/AUTO DIFF WBC: CPT

## 2024-12-21 PROCEDURE — 6370000000 HC RX 637 (ALT 250 FOR IP): Performed by: PHYSICIAN ASSISTANT

## 2024-12-21 PROCEDURE — 84443 ASSAY THYROID STIM HORMONE: CPT

## 2024-12-21 RX ORDER — LABETALOL HYDROCHLORIDE 5 MG/ML
5 INJECTION, SOLUTION INTRAVENOUS ONCE
Status: DISCONTINUED | OUTPATIENT
Start: 2024-12-21 | End: 2024-12-23 | Stop reason: HOSPADM

## 2024-12-21 RX ORDER — FOLIC ACID 1 MG/1
500 TABLET ORAL DAILY
Status: DISCONTINUED | OUTPATIENT
Start: 2024-12-21 | End: 2024-12-23 | Stop reason: HOSPADM

## 2024-12-21 RX ORDER — HYDRALAZINE HYDROCHLORIDE 20 MG/ML
10 INJECTION INTRAMUSCULAR; INTRAVENOUS EVERY 6 HOURS PRN
Status: DISCONTINUED | OUTPATIENT
Start: 2024-12-21 | End: 2024-12-23 | Stop reason: HOSPADM

## 2024-12-21 RX ORDER — MIRTAZAPINE 15 MG/1
15 TABLET, FILM COATED ORAL NIGHTLY
Status: DISCONTINUED | OUTPATIENT
Start: 2024-12-21 | End: 2024-12-23 | Stop reason: HOSPADM

## 2024-12-21 RX ORDER — CLONIDINE 0.1 MG/24H
1 PATCH, EXTENDED RELEASE TRANSDERMAL WEEKLY
Status: DISCONTINUED | OUTPATIENT
Start: 2024-12-21 | End: 2024-12-22

## 2024-12-21 RX ORDER — CLONIDINE 0.1 MG/24H
1 PATCH, EXTENDED RELEASE TRANSDERMAL WEEKLY
Status: DISCONTINUED | OUTPATIENT
Start: 2024-12-21 | End: 2024-12-21

## 2024-12-21 RX ORDER — LEVOTHYROXINE SODIUM 25 UG/1
25 TABLET ORAL DAILY
Status: DISCONTINUED | OUTPATIENT
Start: 2024-12-21 | End: 2024-12-23 | Stop reason: HOSPADM

## 2024-12-21 RX ADMIN — HEPARIN SODIUM 5000 UNITS: 5000 INJECTION INTRAVENOUS; SUBCUTANEOUS at 14:39

## 2024-12-21 RX ADMIN — SEVELAMER CARBONATE 800 MG: 800 TABLET, FILM COATED ORAL at 16:43

## 2024-12-21 RX ADMIN — AMLODIPINE BESYLATE 10 MG: 10 TABLET ORAL at 14:37

## 2024-12-21 RX ADMIN — ASPIRIN 81 MG: 81 TABLET, COATED ORAL at 14:40

## 2024-12-21 RX ADMIN — CARBIDOPA AND LEVODOPA 1 TABLET: 10; 100 TABLET ORAL at 14:37

## 2024-12-21 RX ADMIN — SODIUM CHLORIDE, PRESERVATIVE FREE 10 ML: 5 INJECTION INTRAVENOUS at 21:17

## 2024-12-21 RX ADMIN — WATER 1000 MG: 1 INJECTION INTRAMUSCULAR; INTRAVENOUS; SUBCUTANEOUS at 16:41

## 2024-12-21 RX ADMIN — CARBIDOPA AND LEVODOPA 1 TABLET: 10; 100 TABLET ORAL at 16:43

## 2024-12-21 RX ADMIN — CLOPIDOGREL BISULFATE 75 MG: 75 TABLET ORAL at 14:40

## 2024-12-21 RX ADMIN — LEVOTHYROXINE SODIUM 25 MCG: 0.03 TABLET ORAL at 14:43

## 2024-12-21 RX ADMIN — HEPARIN SODIUM 5000 UNITS: 5000 INJECTION INTRAVENOUS; SUBCUTANEOUS at 06:23

## 2024-12-21 RX ADMIN — FOLIC ACID 500 MCG: 1 TABLET ORAL at 14:43

## 2024-12-21 RX ADMIN — MIRTAZAPINE 15 MG: 15 TABLET, FILM COATED ORAL at 23:35

## 2024-12-21 RX ADMIN — HEPARIN SODIUM 5000 UNITS: 5000 INJECTION INTRAVENOUS; SUBCUTANEOUS at 21:17

## 2024-12-21 RX ADMIN — SODIUM CHLORIDE, PRESERVATIVE FREE 10 ML: 5 INJECTION INTRAVENOUS at 14:40

## 2024-12-21 RX ADMIN — BUPROPION HYDROCHLORIDE 300 MG: 300 TABLET, EXTENDED RELEASE ORAL at 14:38

## 2024-12-21 RX ADMIN — ONDANSETRON 4 MG: 2 INJECTION INTRAMUSCULAR; INTRAVENOUS at 17:07

## 2024-12-21 RX ADMIN — DOCUSATE SODIUM 100 MG: 50 LIQUID ORAL at 16:41

## 2024-12-21 NOTE — CONSULTS
Seen the patient for ESRD on Hemodialysis    Patient seen and examined by me during hemodialysis  /102,   400,  100  Mental status looks better  See full consult for additional details    To Cuadra MD

## 2024-12-21 NOTE — PROGRESS NOTES
Alerted by primary nurse that patient is poor historian,unable to answer questions appropriately, and is confused. Last dose MAR answered through nursing home med rec. Admission questions answered as thoroughly as possible

## 2024-12-21 NOTE — PROGRESS NOTES
[facility]  SPEECH THERAPY  STRZ RENAL TELEMETRY 6K  Clinical Swallow Evaluation    Discharge Recommendations: SNF  DIET ORDER RECOMMENDATIONS AFTER EVALUATION: Regular, thin  Strategies:  Set-up with Meals, Full Upright Position, Small Bite/Sip, and Medications Whole with Puree     SLP Individual Minutes  Time In: 1309  Time Out: 1317  Minutes: 8  Timed Code Treatment Minutes: 0 Minutes       Date: 2024  Patient Name: Deloris Watts      CSN: [id number]   : 1955  (69 y.o.)  Gender: female   Referring Physician:  Maxi Soriano PA-C     Diagnosis: Toxic metabolic encephalopathy    History of Present Illness/Injury: Deloris Watts is a 69 y.o. female with a history of end-stage renal disease on hemodialysis, tremor, anemia and CKD, hypertension, constipation, depression, and right MCA stroke who presented to [facility] with chief complaint of altered mental status.  History is provided by the patient's  who is present at bedside.  The patient currently lives in a nursing facility.  Over the last week or so, the patient has had altered mental status.  She has been having some visual hallucinations and has been having some nonsensical speech.  She missed dialysis Wednesday as well as today.  There was concern for strokelike symptoms today, but an MRI of the brain was negative for acute findings.  She does have a chronic right-sided MCA stroke.  The patient was found to be COVID-positive.  She was also found to have a UA consistent with a urinary tract infection.  The patient's  does report she has been having a productive cough, but she has not complained of any urinary symptoms.  On exam currently, she displays nonsensical speech.  She was unable to contribute to a history or review of systems.   Past Medical History:   Diagnosis Date    Allergic rhinitis     Anemia in CKD (chronic kidney disease)     Anxiety     CHF (congestive heart failure) (HCC)     Closed fracture of

## 2024-12-21 NOTE — PROGRESS NOTES
person, but they can sit up in bed without any help.  Education provided regarding stroke rehabilitation management.    ASSESSMENT:  Activity Tolerance:  Patient tolerance of treatment:Fair. Pt at baseline only completes stand pivots and requires a lot of assist at SNF     Treatment Initiated: Treatment and education initiated within context of evaluation.  Evaluation time included review of current medical information, gathering information related to past medical, social and functional history, completion of standardized testing, formal and informal observation of tasks, assessment of data and development of plan of care and goals.  Treatment time included skilled education and facilitation of tasks to increase safety and independence with functional mobility for improved independence and quality of life.    Assessment:  Body Structures, Functions, Activity Limitations Requiring Skilled Therapeutic Intervention: Decreased functional mobility , Decreased strength, Decreased endurance, Decreased balance, Decreased posture  Assessment: Deloris Watts is a 69 y.o. female that presents with stroke like symptoms. Pt demonstrates a decrease in baseline by way of bed mobility, transfers and ambulation secondary to decreased activity tolerance, strength, fatigue, and balance deficits. Pt will benefit from skilled PT services throughout admission and beyond hospital discharge for improvements in functional mobility and in order to decrease fall risk and return pt to OF.   Therapy Prognosis: Good    Requires PT Follow-Up: Yes    Patient Education:      .    Patient Education  Education Given To: Patient  Education Provided: Role of Therapy, Plan of Care  Education Method: Verbal  Barriers to Learning: None  Education Outcome: Verbalized understanding       Plan:  Current Treatment Recommendations: Strengthening, Balance training, Functional mobility training, Transfer training, Endurance training, Neuromuscular

## 2024-12-21 NOTE — ED NOTES
ED to inpatient nurses report      Chief Complaint:  No chief complaint on file.    Present to ED from: the [facility]    MOA:     LOC: alert to only name  Mobility: Requires assistance * 2 - has not been up in ED  Oxygen Baseline: room air    Current needs required: none     Code Status:   Prior    What abnormal results were found and what did you give/do to treat them? AMS, covid, pneumonia, UTI - atb  Any procedures or intervention occur?   Mental Status:  Level of Consciousness: New confusion or agitation (1)    Psych Assessment:        Vitals:  Patient Vitals for the past 24 hrs:   BP Temp Temp src Pulse Resp SpO2 Height Weight   12/20/24 1914 (!) 146/73 -- -- 83 11 96 % -- --   12/20/24 1811 135/64 -- -- 83 13 94 % -- --   12/20/24 1740 (!) 152/71 -- -- 87 16 100 % -- --   12/20/24 1653 (!) 153/69 -- -- 85 12 98 % -- --   12/20/24 1545 (!) 149/66 -- -- 87 11 100 % -- --   12/20/24 1541 (!) 149/66 -- -- 87 14 98 % -- --   12/20/24 1540 -- -- -- 87 15 100 % -- --   12/20/24 1538 (!) 149/82 -- -- 87 11 94 % -- --   12/20/24 1504 (!) 123/98 -- -- 82 16 96 % -- --   12/20/24 1457 137/66 98.4 °F (36.9 °C) Oral 82 16 96 % 1.524 m (5') 55.9 kg (123 lb 4 oz)        LDAs:   Peripheral IV 12/20/24 Distal;Posterior;Right Forearm (Active)   Site Assessment Clean, dry & intact 12/20/24 1914   Line Status Normal saline locked 12/20/24 1914   Phlebitis Assessment No symptoms 12/20/24 1914   Infiltration Assessment 0 12/20/24 1914   Alcohol Cap Used Yes 12/20/24 1914   Dressing Status Clean, dry & intact 12/20/24 1914   Dressing Type Transparent 12/20/24 1914       Ambulatory Status:  No data recorded    Diagnosis:  DISPOSITION Admitted 12/20/2024 06:38:19 PM   Final diagnoses:   Altered mental status, unspecified altered mental status type   Pneumonia due to infectious organism, unspecified laterality, unspecified part of lung   Stroke-like symptoms   Urinary tract infection without hematuria, site unspecified

## 2024-12-21 NOTE — PROGRESS NOTES
[facility]  OCCUPATIONAL THERAPY MISSED TREATMENT NOTE  STRZ RENAL TELEMETRY 6K  6K-03/003-A      Date: 2024  Patient Name: Deloris Watts        CSN: [id number]   : 1955  (69 y.o.)  Gender: female                REASON FOR MISSED TREATMENT:  transport preparing to take patient off floor for dialysis upon OT arrival. OT to check back as able and time allows.

## 2024-12-21 NOTE — H&P
Hospitalist History & Physical    Patient:  Deloris Watts    Unit/Bed:6K-03/003-A  YOB: 1955  MRN: [id number]   PCP: Johana Weldon MD  Code Status: DNR-CCA    Date of Service: The patient was seen and examined on 12/20/24 and admitted to Inpatient with an expected length of stay of greater than two midnights due to medical therapy.     Chief Complaint: Confusion    Assessment/Plan:    Toxic metabolic encephalopathy  Suspect multifactorial secondary to COVID-19 infection, urinary tract infection, and end-stage renal disease with missed dialysis.  MRI of the brain negative for stroke.  SLP consultation.  N.p.o. except for sips of water with meds until evaluated by speech.  PT OT consultation.  Neurology signed off.  COVID-19  With possible superimposed bacterial pneumonia.  Fluid not currently requiring supplemental oxygen.  Supportive care  Will continue Rocephin and azithromycin due to concern for possible  Urinary tract infection, present on arrival  Continue Rocephin.  Follow culture results.  End-stage renal disease on hemodialysis  Undergoes dialysis Monday, Wednesday, Friday.  Did miss Wednesday and today.  Nephrology consulted for assistance with management.  Tremor  With concern for possible Parkinson's disease.  Was started on Sinemet while on rehab.  Will continue Sinemet.  Referral back to neurology upon discharge for further evaluation.  Anemia in CKD  Hemoglobin 9.0, baseline around 10-11.  Daily CBC.  Hypertension  Continue home regimen  Constipation  Continue home bowel regimen.  Linzess as needed.  Unspecified depression  Continue home regimen  History of right MCA stroke  Continue antiplatelets    History of Present Illness:  Deloris Watts is a 69 y.o. female with a history of end-stage renal disease on hemodialysis, tremor, anemia and CKD, hypertension, constipation, depression, and right MCA stroke who presented to [facility] with chief complaint of altered mental status.  Lyly Larios    XR CHEST PORTABLE    Result Date: 12/20/2024  PROCEDURE: XR CHEST PORTABLE CLINICAL INFORMATION: Acute CVA COMPARISON: August 5, 2024 TECHNIQUE: A single mobile view of the chest was obtained.     1. Normal heart size. Prior anterior cervical fusion. Prior surgery proximal right humerus. Left jugular dialysis catheter, tip in right atrium. 2. No pneumothorax is seen. No effusion. Very mild retrocardiac atelectasis/pneumonia. **This report has been created using voice recognition software.  It may contain minor errors which are inherent in voice recognition technology.** Electronically signed by Dr. Tank Newman    CT HEAD WO CONTRAST    Result Date: 12/20/2024  PROCEDURE: CT CODE NEURO HEAD WO CONTRAST CLINICAL INFORMATION: 69-year-old female with strokelike symptoms. COMPARISON: CT 9/27/2024 TECHNIQUE: Noncontrast 5 mm axial images were obtained through the brain. All CT scans at this facility use dose modulation, iterative reconstruction, and/or weight-based dosing when appropriate to reduce radiation dose to as low as reasonably achievable. FINDINGS: There is encephalomalacia in the right frontal lobe likely representing the sequelae of a previous infarct. There is generalized prominence of the sulci and gyri consistent with age-related volume loss. There is no hemorrhage. There are no intra-or extra-axial collections.  There is no hydrocephalus, midline shift or mass effect.  The gray-white matter differentiation is preserved. There is hypoattenuation in the periventricular white matter consistent with chronic microvascular ischemic disease. There is mucosal thickening in the maxillary sinuses and ethmoid air cells. There is no suspicious calvarial abnormality.     1. No acute intracranial hemorrhage, mass effect or midline shift. 2. Evidence of an old infarct in the right frontal lobe. Case was discussed by Dr. Yan with neuro DENISSE Sandoval at 3:15 p.m. 12/20/2024 via telephone. **This report

## 2024-12-21 NOTE — CONSULTS
[facility]          [address]        [address]        [place], [place] [address] [phone]           Inpatient Initial consult note         12/21/2024 10:21 AM      Patient Name:   Deloris Watts  YOB: 1955  Primary Care Physician:  Johana Weldon MD   Admit Date:    12/20/2024  2:55 PM    Consultation requested by : Edward Fitzegrald MD    Reason for Consult : Nephrology following for ESRD on hemodialysis.    History of presenting illness :Deloris Watts is a 69 y.o.   female with Past Medical History as stated below presented with a chief complaint of No chief complaint on file.   on 12/20/2024 .    Patient presented with chief complaints of change in mental status she was apparently having some visual hallucinations and nonspecific speech she is an ESRD patient on hemodialysis and apparently has missed her dialysis treatment on Wednesday and again on Friday.  She was apparently positive for COVID and UA consistent with UTI    Patient was seen during dialysis and her mental status looks normal not in any distress tolerating dialysis well    Past History:  Past Medical History:   Diagnosis Date    Allergic rhinitis     Anemia in CKD (chronic kidney disease)     Anxiety     CHF (congestive heart failure) (HCC)     Closed fracture of right proximal humerus 09/18/2021    ORIF    Closed right ankle fracture     In 1990s; treated with casting    CVA (cerebral infarction)     in 1990s ; with left-sided weakness    Depression     Fibromyalgia     in 1990s    Headache(784.0)     Hemiplegia and hemiparesis following cerebral infarction affecting left non-dominant side (HCC)     in 1990s    Hydronephrosis 09/01/2023    Urogenital Implants, calculus of ureter, UTI    Hyperlipidemia     Hypertension     in 1980s    Irritable bowel syndrome     in 1990s    Kidney failure     Chronic Kidney Disease, Renal Dialysis started in 1/2023    Osteoporosis 2019    Unspecified  Conjunctiva/sclera: Conjunctivae normal.   Cardiovascular:      Rate and Rhythm: Normal rate and regular rhythm.      Heart sounds: Normal heart sounds.   Pulmonary:      Effort: Pulmonary effort is normal. No respiratory distress.      Breath sounds: Normal breath sounds. No wheezing.   Abdominal:      General: There is no distension.      Palpations: Abdomen is soft.      Tenderness: There is no abdominal tenderness.   Musculoskeletal:         General: No swelling.      Cervical back: Normal range of motion and neck supple.      Right lower leg: No edema.      Left lower leg: No edema.   Skin:     General: Skin is warm and dry.      Findings: No rash.   Neurological:      General: No focal deficit present.      Mental Status: She is alert and oriented to person, place, and time.   Psychiatric:         Mood and Affect: Mood normal.         Behavior: Behavior normal.          BP (!) 185/86   Pulse 93   Temp 98.3 °F (36.8 °C) (Oral)   Resp 13   Ht 1.524 m (5')   Wt 55.9 kg (123 lb 4 oz)   SpO2 97%   BMI 24.07 kg/m²   Labs, Radiology and Tests :  CBC:   Recent Labs     12/20/24  1516 12/21/24  0639   WBC 10.0 9.2   HGB 9.0* 9.1*   HCT 30.4* 30.1*    380     CMP:  Recent Labs     12/20/24  1457 12/20/24  1516 12/21/24  0639   NA  --  137 137   K  --  4.8 4.9   CL  --  101 102   CO2  --  18* 17*   BUN  --  59* 59*   CREATININE  --  7.1* 7.6*   GLUCOSE 290 186* 70   CALCIUM  --  9.0 9.3     Hepatic:   Recent Labs     12/20/24  1516   AST 13   ALT <5*   BILITOT 0.3   ALKPHOS 143*       Radiology : Chest x-ray personally reviewed by me does not show any congestion    Assessment and Plan:  Renal -ESRD on hemodialysis Monday Wednesday Friday missed the last 2 dialysis treatment  Currently getting dialyzed tolerating well  Placed on a Monday Wednesday Friday schedule  Electrolytes -within normal limits HD on a 2K bath  Metabolic acidosis should be corrected with dialysis  Essential hypertension  Possible UTI on

## 2024-12-21 NOTE — ED NOTES
Patient to be transferred to [facility]. Patient is in stable condition at this time. Staff notified prior to transfer.

## 2024-12-21 NOTE — PROGRESS NOTES
Hospitalist Progress Note      Patient:  Deloris Watts 69 y.o. female     Unit/Bed:-03/003-A    Date of Admission: 12/20/2024      ASSESSMENT AND PLAN    Active Problems  Acute encephalopathy, multifactorial toxic, polypharmacy, hypertensive and UTI.  Improving.  MRI negative.  Neurology initially was consulted due to concern for stroke.  Negative  Accelerated hypertension.  At home on clonidine 0.1 mg 3 times daily, amlodipine 10 mg, Cardura 2 mg.  Add hydralazine 10 mg as needed.  If blood pressure does not improve after dialysis will adjust medications.  Anion gap metabolic acidosis.  Secondary to missed dialysis/ESRD.  Subclinical hypothyroidism, given confusion will start low-dose Synthroid.  Schizoaffective disorder.  Mirtazapine 15 mg, hold Zoloft 50 mg and trazodone 100 mg, continue bupropion 150 mg.  Constipation.  Continue Linzess as needed.  COVID-19.  Supportive care.  No concern for superimposed bacterial pneumonia.  UTI.  Patient makes urine.  Continue Rocephin.  Follow-up on culture.  ESRD likely secondary to hypertensive nephropathy.  HD on Monday Wednesday Friday.  Appreciate nephrology assistance.  Undergoing dialysis.  Parkinson's/tremor.  Continue sentiment.  Outpatient follow-up with neurology  History of CVA.  Continue DAPT.  SLP evaluation      LDA: [x]CVC / []PICC / []Midline / []Smith / []Drains / []Mediport / []None  Antibiotics: Ceftriaxone and azithromycin.  Steroids: None  Labs (still needed?): [x]Yes / []No  IVF (still needed?): []Yes / [x]No    Level of care: []Step Down / [x]Med-Surg  Bed Status: [x]Inpatient / []Observation  Telemetry: [x]Yes / []No  PT/OT: [x]Yes / []No    DVT Prophylaxis: [] Lovenox / [x] Heparin / [] SCDs / [] Already on Systemic Anticoagulation / [] None   Code status: DNR-CCA     Expected discharge date: 48 hours  Disposition: SNF    ===================================================================    Chief Complaint: Change in mental status  Subjective  (past 24 hours): Patient was seen and examined in dialysis unit, laying down comfortably undergoing dialysis.  Patient is alert to self, place and time.  Denies any headache, vision changes, numbness, tingling.  Patient denies any trouble breathing.  Lying down comfortably      HPI / Hospital Course:  \"Deloris Watts is a 69 y.o. female with a history of end-stage renal disease on hemodialysis, tremor, anemia and CKD, hypertension, constipation, depression, and right MCA stroke who presented to [facility] with chief complaint of altered mental status.  History is provided by the patient's  who is present at bedside.  The patient currently lives in a nursing facility.  Over the last week or so, the patient has had altered mental status.  She has been having some visual hallucinations and has been having some nonsensical speech.  She missed dialysis Wednesday as well as today.  There was concern for strokelike symptoms today, but an MRI of the brain was negative for acute findings.  She does have a chronic right-sided MCA stroke.  The patient was found to be COVID-positive.  She was also found to have a UA consistent with a urinary tract infection.  The patient's  does report she has been having a productive cough, but she has not complained of any urinary symptoms.  On exam currently, she displays nonsensical speech.  She was unable to contribute to a history or review of systems. \"    Medications:    Infusion Medications    sodium chloride      Scheduled Medications    labetalol  5 mg IntraVENous Once    cloNIDine  1 patch TransDERmal Weekly    sodium chloride flush  5-40 mL IntraVENous 2 times per day    heparin (porcine)  5,000 Units SubCUTAneous 3 times per day    amLODIPine  10 mg Oral Daily    aspirin  81 mg Oral Daily    buPROPion  150 mg Oral QAM    buPROPion  300 mg Oral Daily    carbidopa-levodopa  1 tablet Oral TID    clopidogrel  75 mg Oral Daily    primidone  50 mg Oral Daily    senna  2 tablet

## 2024-12-22 LAB
ANION GAP SERPL CALC-SCNC: 18 MEQ/L (ref 8–16)
BACTERIA UR CULT: ABNORMAL
BUN SERPL-MCNC: 24 MG/DL (ref 7–22)
CALCIUM SERPL-MCNC: 8.8 MG/DL (ref 8.5–10.5)
CHLORIDE SERPL-SCNC: 95 MEQ/L (ref 98–111)
CO2 SERPL-SCNC: 22 MEQ/L (ref 23–33)
CREAT SERPL-MCNC: 3.9 MG/DL (ref 0.4–1.2)
DEPRECATED RDW RBC AUTO: 52.3 FL (ref 35–45)
ERYTHROCYTE [DISTWIDTH] IN BLOOD BY AUTOMATED COUNT: 14.6 % (ref 11.5–14.5)
GFR SERPL CREATININE-BSD FRML MDRD: 12 ML/MIN/1.73M2
GLUCOSE SERPL-MCNC: 59 MG/DL (ref 70–108)
HCT VFR BLD AUTO: 30.6 % (ref 37–47)
HGB BLD-MCNC: 9.6 GM/DL (ref 12–16)
MAGNESIUM SERPL-MCNC: 2.3 MG/DL (ref 1.6–2.4)
MCH RBC QN AUTO: 30.6 PG (ref 26–33)
MCHC RBC AUTO-ENTMCNC: 31.4 GM/DL (ref 32.2–35.5)
MCV RBC AUTO: 97.5 FL (ref 81–99)
ORGANISM: ABNORMAL
PLATELET # BLD AUTO: 416 THOU/MM3 (ref 130–400)
PMV BLD AUTO: 9.4 FL (ref 9.4–12.4)
POTASSIUM SERPL-SCNC: 3.5 MEQ/L (ref 3.5–5.2)
RBC # BLD AUTO: 3.14 MILL/MM3 (ref 4.2–5.4)
SODIUM SERPL-SCNC: 135 MEQ/L (ref 135–145)
WBC # BLD AUTO: 11.6 THOU/MM3 (ref 4.8–10.8)

## 2024-12-22 PROCEDURE — 97166 OT EVAL MOD COMPLEX 45 MIN: CPT

## 2024-12-22 PROCEDURE — 36415 COLL VENOUS BLD VENIPUNCTURE: CPT

## 2024-12-22 PROCEDURE — 1200000003 HC TELEMETRY R&B

## 2024-12-22 PROCEDURE — 90935 HEMODIALYSIS ONE EVALUATION: CPT

## 2024-12-22 PROCEDURE — 97530 THERAPEUTIC ACTIVITIES: CPT

## 2024-12-22 PROCEDURE — 83735 ASSAY OF MAGNESIUM: CPT

## 2024-12-22 PROCEDURE — 80048 BASIC METABOLIC PNL TOTAL CA: CPT

## 2024-12-22 PROCEDURE — 85027 COMPLETE CBC AUTOMATED: CPT

## 2024-12-22 PROCEDURE — 6360000002 HC RX W HCPCS: Performed by: PHYSICIAN ASSISTANT

## 2024-12-22 PROCEDURE — 99233 SBSQ HOSP IP/OBS HIGH 50: CPT | Performed by: STUDENT IN AN ORGANIZED HEALTH CARE EDUCATION/TRAINING PROGRAM

## 2024-12-22 PROCEDURE — 6360000002 HC RX W HCPCS: Performed by: STUDENT IN AN ORGANIZED HEALTH CARE EDUCATION/TRAINING PROGRAM

## 2024-12-22 PROCEDURE — 6370000000 HC RX 637 (ALT 250 FOR IP): Performed by: PHYSICIAN ASSISTANT

## 2024-12-22 PROCEDURE — 2500000003 HC RX 250 WO HCPCS: Performed by: PHYSICIAN ASSISTANT

## 2024-12-22 PROCEDURE — 99232 SBSQ HOSP IP/OBS MODERATE 35: CPT | Performed by: INTERNAL MEDICINE

## 2024-12-22 PROCEDURE — 6370000000 HC RX 637 (ALT 250 FOR IP): Performed by: STUDENT IN AN ORGANIZED HEALTH CARE EDUCATION/TRAINING PROGRAM

## 2024-12-22 RX ORDER — CLONIDINE HYDROCHLORIDE 0.2 MG/1
0.2 TABLET ORAL 3 TIMES DAILY
Status: DISCONTINUED | OUTPATIENT
Start: 2024-12-22 | End: 2024-12-23 | Stop reason: HOSPADM

## 2024-12-22 RX ORDER — HYDRALAZINE HYDROCHLORIDE 25 MG/1
25 TABLET, FILM COATED ORAL EVERY 8 HOURS SCHEDULED
Status: DISCONTINUED | OUTPATIENT
Start: 2024-12-22 | End: 2024-12-23 | Stop reason: HOSPADM

## 2024-12-22 RX ADMIN — CARBIDOPA AND LEVODOPA 1 TABLET: 10; 100 TABLET ORAL at 16:36

## 2024-12-22 RX ADMIN — DOCUSATE SODIUM 100 MG: 50 LIQUID ORAL at 21:40

## 2024-12-22 RX ADMIN — AMLODIPINE BESYLATE 10 MG: 10 TABLET ORAL at 11:26

## 2024-12-22 RX ADMIN — HEPARIN SODIUM 5000 UNITS: 5000 INJECTION INTRAVENOUS; SUBCUTANEOUS at 14:28

## 2024-12-22 RX ADMIN — SODIUM CHLORIDE, PRESERVATIVE FREE 10 ML: 5 INJECTION INTRAVENOUS at 11:27

## 2024-12-22 RX ADMIN — MIRTAZAPINE 15 MG: 15 TABLET, FILM COATED ORAL at 21:40

## 2024-12-22 RX ADMIN — HYDRALAZINE HYDROCHLORIDE 25 MG: 25 TABLET ORAL at 21:40

## 2024-12-22 RX ADMIN — HEPARIN SODIUM 5000 UNITS: 5000 INJECTION INTRAVENOUS; SUBCUTANEOUS at 21:40

## 2024-12-22 RX ADMIN — CLOPIDOGREL BISULFATE 75 MG: 75 TABLET ORAL at 11:25

## 2024-12-22 RX ADMIN — LEVOTHYROXINE SODIUM 25 MCG: 0.03 TABLET ORAL at 05:37

## 2024-12-22 RX ADMIN — HYDRALAZINE HYDROCHLORIDE 10 MG: 20 INJECTION INTRAMUSCULAR; INTRAVENOUS at 11:32

## 2024-12-22 RX ADMIN — ACETAMINOPHEN 650 MG: 325 TABLET ORAL at 21:40

## 2024-12-22 RX ADMIN — CARBIDOPA AND LEVODOPA 1 TABLET: 10; 100 TABLET ORAL at 11:25

## 2024-12-22 RX ADMIN — CARBIDOPA AND LEVODOPA 1 TABLET: 10; 100 TABLET ORAL at 05:37

## 2024-12-22 RX ADMIN — DOCUSATE SODIUM 100 MG: 50 LIQUID ORAL at 14:28

## 2024-12-22 RX ADMIN — HEPARIN SODIUM 5000 UNITS: 5000 INJECTION INTRAVENOUS; SUBCUTANEOUS at 05:37

## 2024-12-22 RX ADMIN — SERTRALINE HYDROCHLORIDE 50 MG: 50 TABLET ORAL at 14:29

## 2024-12-22 RX ADMIN — SEVELAMER CARBONATE 800 MG: 800 TABLET, FILM COATED ORAL at 16:36

## 2024-12-22 RX ADMIN — SEVELAMER CARBONATE 800 MG: 800 TABLET, FILM COATED ORAL at 11:25

## 2024-12-22 RX ADMIN — CLONIDINE HYDROCHLORIDE 0.2 MG: 0.2 TABLET ORAL at 14:29

## 2024-12-22 RX ADMIN — WATER 1000 MG: 1 INJECTION INTRAMUSCULAR; INTRAVENOUS; SUBCUTANEOUS at 16:35

## 2024-12-22 RX ADMIN — POTASSIUM BICARBONATE 20 MEQ: 782 TABLET, EFFERVESCENT ORAL at 11:24

## 2024-12-22 RX ADMIN — HYDRALAZINE HYDROCHLORIDE 25 MG: 25 TABLET ORAL at 14:29

## 2024-12-22 RX ADMIN — CLONIDINE HYDROCHLORIDE 0.2 MG: 0.2 TABLET ORAL at 21:40

## 2024-12-22 RX ADMIN — BUPROPION HYDROCHLORIDE 300 MG: 300 TABLET, EXTENDED RELEASE ORAL at 11:26

## 2024-12-22 RX ADMIN — FOLIC ACID 500 MCG: 1 TABLET ORAL at 11:26

## 2024-12-22 RX ADMIN — SENNOSIDES 17.2 MG: 8.6 TABLET, FILM COATED ORAL at 21:40

## 2024-12-22 RX ADMIN — SODIUM CHLORIDE, PRESERVATIVE FREE 10 ML: 5 INJECTION INTRAVENOUS at 21:30

## 2024-12-22 RX ADMIN — CLONIDINE HYDROCHLORIDE 0.2 MG: 0.2 TABLET ORAL at 11:33

## 2024-12-22 RX ADMIN — ASPIRIN 81 MG: 81 TABLET, COATED ORAL at 11:25

## 2024-12-22 ASSESSMENT — PAIN SCALES - WONG BAKER: WONGBAKER_NUMERICALRESPONSE: HURTS A LITTLE BIT

## 2024-12-22 NOTE — CARE COORDINATION
12/22/24 1056   Service Assessment   Patient Orientation Alert and Oriented;Person   Cognition Other (see comment)  (hx stroke - no short term memory)   History Provided By Medical Record   Primary Caregiver Other (Comment)  (From SNF)   Accompanied By/Relationship n/a   Support Systems Spouse/Significant Other   Patient's Healthcare Decision Maker is: Named in Scanned ACP Document   PCP Verified by CM Yes   Last Visit to PCP Within last 3 months   Prior Functional Level Assistance with the following:;Bathing;Dressing;Cooking;Housework;Shopping;Mobility   Current Functional Level Assistance with the following:;Bathing;Dressing;Cooking;Housework;Shopping;Mobility   Can patient return to prior living arrangement Unknown at present   Ability to make needs known: Fair   Family able to assist with home care needs: No   Would you like for me to discuss the discharge plan with any other family members/significant others, and if so, who? Yes  ()   Financial Resources Medicare   Community Resources ECF/Home Care   CM/SW Referral DME;Other (see comment)  (from [facility])   Social/Functional History   Lives With Other (Comment)  (SNF)   Type of Home Facility   Home Layout One level   Discharge Planning   Type of Residence Skilled Nursing Facility   Living Arrangements Other (Comment)  (SNF)   Current Services Prior To Admission Skilled Nursing Facility   Potential Assistance Needed Skilled Nursing Facility   DME Ordered? No   Potential Assistance Purchasing Medications No   Type of Home Care Services None   Patient expects to be discharged to: Skilled nursing facility   Follow Up Appointment: Best Day/Time  Tuesday PM   One/Two Story Residence One story   History of falls? 0   Services At/After Discharge   Transition of Care Consult (CM Consult) Discharge Planning;SNF   Services At/After Discharge PT;OT;Nursing services;Skilled Nursing Facility (SNF);SLP   Confirm Follow Up Transport Other (see

## 2024-12-22 NOTE — PROGRESS NOTES
[facility]  INPATIENT OCCUPATIONAL THERAPY  STRZ RENAL TELEMETRY 6K  EVALUATION      Discharge Recommendations: Long Term Care with OT, 24 hour supervision or assist  Equipment Recommendations: No        Time In: 1318  Time Out: 1337  Timed Code Treatment Minutes: 10 Minutes  Minutes: 19          Date: 2024  Patient Name: Deloris Watts,   Gender: female      MRN: [id number]  : 1955  (69 y.o.)  Referring Practitioner: Maxi Soriano PA-C  Diagnosis: Toxic metabolic encephalopathy  Additional Pertinent Hx: per chart review; Deloris Watts is a 69 y.o. female with a history of end-stage renal disease on hemodialysis, tremor, anemia and CKD, hypertension, constipation, depression, and right MCA stroke who presented to [facility] with chief complaint of altered mental status.  History is provided by the patient's  who is present at bedside.  The patient currently lives in a nursing facility.  Over the last week or so, the patient has had altered mental status.  She has been having some visual hallucinations and has been having some nonsensical speech.  She missed dialysis Wednesday as well as today.  There was concern for strokelike symptoms today, but an MRI of the brain was negative for acute findings.  She does have a chronic right-sided MCA stroke.  The patient was found to be COVID-positive.  She was also found to have a UA consistent with a urinary tract infection.  The patient's  does report she has been having a productive cough, but she has not complained of any urinary symptoms.  On exam currently, she displays nonsensical speech.  She was unable to contribute to a history or review of systems    Restrictions/Precautions:  Restrictions/Precautions: Fall Risk, General Precautions, Contact Precautions, Isolation  Position Activity Restriction  Other Position/Activity Restrictions: Hx Parkinson's, Dialysis MWF    Subjective  Chart Reviewed: Yes, Orders, Progress Notes,  patient will progress to sit to stand and stand pivot transfers and create goal as appropriate.    AM-PAC Inpatient Daily Activity Raw Score: 12  AM-PAC Inpatient ADL T-Scale Score : 30.6    Following session, patient left in safe position with all fall risk precautions in place.

## 2024-12-22 NOTE — FLOWSHEET NOTE
12/21/24 1127   Vital Signs   BP (!) 173/98   Temp 97.7 °F (36.5 °C)   Pulse (!) 105   Respirations 14   SpO2 99 %   Weight - Scale 53 kg (116 lb 13.5 oz)   Weight Method Bed scale   Percent Weight Change -5.2   Dry Weight 55 kg (121 lb 4.1 oz)   Post-Hemodialysis Assessment   Post-Treatment Procedures Blood returned;Catheter Capped, clamped with Saline x2 ports   Machine Disinfection Process Acid/Vinegar Clean;Heat Disinfect;Exterior Machine Disinfection   Rinseback Volume (ml) 400 ml   Blood Volume Processed (Liters) 56.8 L   Dialyzer Clearance Lightly streaked   Duration of Treatment (minutes) 150 minutes   Hemodialysis Intake (ml) 400 ml   Hemodialysis Output (ml) 400 ml   NET Removed (ml) 0   Tolerated Treatment Good       Stable 2.5 hour treatment complete. 0.4L removed due to patient 2K under EDW. Bilateral cath ports flushed with 0.9% NS. Clamped and secured with tegos. CVC dressing changed and is clean. dry and intact. Report called to primary RN.Tx record printed for scanning in EMR.  
   12/22/24 0740 12/22/24 1032   Vital Signs   BP (!) 163/92 (!) 196/106   Temp 98.3 °F (36.8 °C) 98.3 °F (36.8 °C)   Pulse (!) 105 (!) 102   Respirations 23 18   SpO2 98 % 98 %   Weight - Scale 52.3 kg (115 lb 4.8 oz) 52.3 kg (115 lb 4.8 oz)   Weight Method Bed scale Bed scale   Percent Weight Change -1.32 -1.32   Post-Hemodialysis Assessment   Post-Treatment Procedures  --  Blood returned;Catheter Capped, clamped with Saline x2 ports   Machine Disinfection Process  --  Acid/Vinegar Clean;Heat Disinfect;Exterior Machine Disinfection   Blood Volume Processed (Liters)  --  56.8 L   Dialyzer Clearance  --  Lightly streaked   Duration of Treatment (minutes)  --  150 minutes   Hemodialysis Intake (ml)  --  400 ml   Hemodialysis Output (ml)  --  400 ml   NET Removed (ml)  --  0   Tolerated Treatment  --  Good     Stable 2.5 hour TX completed. Removed no fluid. Tolerated TX well. Bilateral cath ports flushed with normal saline, clamped, and secured with tegos. CVC drsg clean, dry, and intact. Report called to primary RN. TX record printed for scanning into EMR.  
I was physically present for the key portions of the evaluation and management (E/M) service provided. I agree with the above history, physical, and plan which I have reviewed and edited where appropriate.

## 2024-12-22 NOTE — PROGRESS NOTES
[facility]   Nephrology progress note  12/22/2024, 8:12 AM      Pt Name:    Deloris Watts  MRN:     [id number]     YOB: 1955  Admit Date:    12/20/2024  2:55 PM    Chief Complaint: Nephrology following for ESRD on hemodialysis.    Subjective:  Patient seen and examined  No chest pain or shortness of breath  Feels okay.  Mental status is better    Objective:  24HR INTAKE/OUTPUT:    Intake/Output Summary (Last 24 hours) at 12/22/2024 0812  Last data filed at 12/21/2024 1829  Gross per 24 hour   Intake 400 ml   Output 750 ml   Net -350 ml      Admission weight: 55.9 kg (123 lb 4 oz)  Wt Readings from Last 3 Encounters:   12/21/24 53 kg (116 lb 13.5 oz)   11/21/24 52.5 kg (115 lb 11.9 oz)   09/27/24 49 kg (108 lb)        Vitals :   Vitals:    12/21/24 1640 12/21/24 2054 12/21/24 2334 12/22/24 0337   BP: (!) 163/77 (!) 176/91 (!) 168/76 (!) 168/83   Pulse: 97 (!) 101 97 96   Resp:  18 18 18   Temp:  98.4 °F (36.9 °C) 97.9 °F (36.6 °C) 97.8 °F (36.6 °C)   TempSrc:  Oral Oral Oral   SpO2:  96% 98% 98%   Weight:       Height:           Physical examination  General Appearance:  Well developed. No distress  Mouth/Throat:  Oral mucosa moist  Neck:  Supple, no JVD  Lungs:  Breath sounds: clear  Heart::  S1,S2 heard  Abdomen:  Soft, non - tender  Musculoskeletal:  Edema -no edema    Medications:  Infusion:    sodium chloride       Meds:    potassium bicarb-citric acid  20 mEq Oral Once    labetalol  5 mg IntraVENous Once    cloNIDine  1 patch TransDERmal Weekly    folic acid  500 mcg Oral Daily    mirtazapine  15 mg Oral Nightly    levothyroxine  25 mcg Oral Daily    sodium chloride flush  5-40 mL IntraVENous 2 times per day    heparin (porcine)  5,000 Units SubCUTAneous 3 times per day    amLODIPine  10 mg Oral Daily    aspirin  81 mg Oral Daily    buPROPion  300 mg Oral Daily    carbidopa-levodopa  1 tablet Oral TID    clopidogrel  75 mg Oral Daily    senna  2 tablet Oral Nightly    [Held

## 2024-12-22 NOTE — PROGRESS NOTES
Hospitalist Progress Note      Patient:  Deloris Watts 69 y.o. female     Unit/Bed:-03/003-A    Date of Admission: 12/20/2024      ASSESSMENT AND PLAN    Active Problems  Acute encephalopathy, multifactorial toxic, polypharmacy, hypertensive and UTI.  Improving.  MRI negative.  Neurology initially was consulted due to concern for stroke.  Negative  Accelerated hypertension.  Still elevated, add hydralazine 25 mg 3 times daily, increase clonidine to 0.2 mg 3 times daily.  Resume Cardura 2 mg.  Continue amlodipine 10 mg, hydralazine 10 mg as needed.   Anion gap metabolic acidosis.  Secondary to missed dialysis/ESRD.  Subclinical hypothyroidism, given confusion started on low-dose Synthroid.  Schizoaffective disorder.  Bupropion 300 mirtazapine 15 mg, Zoloft 50 mg, hold trazodone 50 mg,   Constipation.  Continue Linzess as needed.  COVID-19.  Supportive care.  No concern for superimposed bacterial pneumonia.  UTI.  Urine culture grew Proteus, sensitive to Rocephin, continue   ESRD likely secondary to hypertensive nephropathy.  HD on Monday Wednesday Friday.  Appreciate nephrology assistance.  Undergoing dialysis.  Parkinson's/tremor.  Continue sentiment.  Outpatient follow-up with neurology  History of CVA.  Continue DAPT.  SLP evaluation      LDA: [x]CVC / []PICC / []Midline / []Smith / []Drains / []Mediport / []None  Antibiotics: Ceftriaxone and azithromycin.  Steroids: None  Labs (still needed?): [x]Yes / []No  IVF (still needed?): []Yes / [x]No    Level of care: []Step Down / [x]Med-Surg  Bed Status: [x]Inpatient / []Observation  Telemetry: [x]Yes / []No  PT/OT: [x]Yes / []No    DVT Prophylaxis: [] Lovenox / [x] Heparin / [] SCDs / [] Already on Systemic Anticoagulation / [] None   Code status: DNR-CCA     Expected discharge date: 48 hours  Disposition: SNF    ===================================================================    Chief Complaint: Change in mental status  Subjective (past 24 hours): Patient  300 mg Oral Daily    carbidopa-levodopa  1 tablet Oral TID    clopidogrel  75 mg Oral Daily    senna  2 tablet Oral Nightly    [Held by provider] sertraline  50 mg Oral Daily    sevelamer  800 mg Oral TID WC    [Held by provider] traZODone  100 mg Oral Nightly    cefTRIAXone (ROCEPHIN) IV  1,000 mg IntraVENous Q24H    docusate  100 mg Oral TID    polyethylene glycol  17 g Oral Daily    PRN Meds: hydrALAZINE, sodium chloride flush, sodium chloride, ondansetron **OR** ondansetron, polyethylene glycol, acetaminophen **OR** acetaminophen, bisacodyl    Exam:  BP (!) 196/106   Pulse (!) 102   Temp 98.3 °F (36.8 °C)   Resp 18   Ht 1.524 m (5')   Wt 52.3 kg (115 lb 4.8 oz)   SpO2 98%   BMI 22.52 kg/m²   General: Ill-appearing  Eyes:  PERRL. Conjunctivae/corneas clear.  HENT: Head normal appearing. Nares normal. Oral mucosa moist.  Hearing intact.   Neck: Supple, with full range of motion. Trachea midline.  No gross JVD appreciated.  Respiratory:  Normal effort. Clear to auscultation, without rales or wheezes or rhonchi.  Cardiovascular: Normal rate, regular rhythm with normal S1/S2 without murmurs.    No lower extremity edema.   Abdomen: Soft, non-tender, non-distended with normal bowel sounds.  Musculoskeletal: No joint swelling or tenderness. Normal tone. No abnormal movements.   Skin: Warm and dry. No rashes or lesions.  Neurologic:  No focal sensory/motor deficits in the upper or lower extremities. Cranial nerves:  grossly non-focal 2-12.     Psychiatric: Confused, not alert and oriented.  Poor insight  Capillary Refill: Brisk,< 3 seconds.  Peripheral Pulses: +2 palpable, equal bilaterally.       Labs/Radiology: See chart or assessment above.     Electronically signed by Edward Fitzgerald MD on 12/22/2024 at 10:35 AM

## 2024-12-22 NOTE — PLAN OF CARE
Problem: Discharge Planning  Goal: Discharge to home or other facility with appropriate resources  Note: Return to [facility]     Problem: Skin/Tissue Integrity  Goal: Absence of new skin breakdown  Description: 1.  Monitor for areas of redness and/or skin breakdown  2.  Assess vascular access sites hourly  3.  Every 4-6 hours minimum:  Change oxygen saturation probe site  4.  Every 4-6 hours:  If on nasal continuous positive airway pressure, respiratory therapy assess nares and determine need for appliance change or resting period.  Note: No new signs or symptoms of skin breakdown noted this shift, encouraging patient to turn and reposition self in bed q2h      Problem: Safety - Adult  Goal: Free from fall injury  Note: No falls noted this shift. Continue falling star program. Bed alarm on, bed in low position. Call light and personal belongings in reach.  Patient uses call light appropriately.

## 2024-12-23 ENCOUNTER — APPOINTMENT (OUTPATIENT)
Age: 69
DRG: 177 | End: 2024-12-23
Attending: STUDENT IN AN ORGANIZED HEALTH CARE EDUCATION/TRAINING PROGRAM
Payer: MEDICARE

## 2024-12-23 VITALS
RESPIRATION RATE: 16 BRPM | DIASTOLIC BLOOD PRESSURE: 79 MMHG | WEIGHT: 120.37 LBS | OXYGEN SATURATION: 95 % | TEMPERATURE: 98.3 F | HEIGHT: 60 IN | BODY MASS INDEX: 23.63 KG/M2 | HEART RATE: 95 BPM | SYSTOLIC BLOOD PRESSURE: 145 MMHG

## 2024-12-23 LAB
ANION GAP SERPL CALC-SCNC: 14 MEQ/L (ref 8–16)
BUN SERPL-MCNC: 19 MG/DL (ref 7–22)
CALCIUM SERPL-MCNC: 9.1 MG/DL (ref 8.5–10.5)
CHLORIDE SERPL-SCNC: 98 MEQ/L (ref 98–111)
CO2 SERPL-SCNC: 25 MEQ/L (ref 23–33)
CREAT SERPL-MCNC: 3.5 MG/DL (ref 0.4–1.2)
DEPRECATED RDW RBC AUTO: 55.1 FL (ref 35–45)
ECHO AV CUSP MM: 1.9 CM
ECHO AV PEAK GRADIENT: 14 MMHG
ECHO AV PEAK VELOCITY: 1.8 M/S
ECHO AV VELOCITY RATIO: 0.56
ECHO BSA: 1.54 M2
ECHO EST RA PRESSURE: 10 MMHG
ECHO LA AREA 2C: 11.3 CM2
ECHO LA AREA 4C: 12.3 CM2
ECHO LA DIAMETER INDEX: 1.87 CM/M2
ECHO LA DIAMETER: 2.8 CM
ECHO LA MAJOR AXIS: 4.6 CM
ECHO LA MINOR AXIS: 4.2 CM
ECHO LA VOL BP: 26 ML (ref 22–52)
ECHO LA VOL MOD A2C: 25 ML (ref 22–52)
ECHO LA VOL MOD A4C: 26 ML (ref 22–52)
ECHO LA VOL/BSA BIPLANE: 17 ML/M2 (ref 16–34)
ECHO LA VOLUME INDEX MOD A2C: 17 ML/M2 (ref 16–34)
ECHO LA VOLUME INDEX MOD A4C: 17 ML/M2 (ref 16–34)
ECHO LV E' LATERAL VELOCITY: 9.5 CM/S
ECHO LV E' SEPTAL VELOCITY: 8.5 CM/S
ECHO LV EJECTION FRACTION BIPLANE: 58 % (ref 55–100)
ECHO LV FRACTIONAL SHORTENING: 30 % (ref 28–44)
ECHO LV INTERNAL DIMENSION DIASTOLE INDEX: 2.87 CM/M2
ECHO LV INTERNAL DIMENSION DIASTOLIC: 4.3 CM (ref 3.9–5.3)
ECHO LV INTERNAL DIMENSION SYSTOLIC INDEX: 2 CM/M2
ECHO LV INTERNAL DIMENSION SYSTOLIC: 3 CM
ECHO LV ISOVOLUMETRIC RELAXATION TIME (IVRT): 63 MS
ECHO LV IVSD: 0.9 CM (ref 0.6–0.9)
ECHO LV MASS 2D: 123.3 G (ref 67–162)
ECHO LV MASS INDEX 2D: 82.2 G/M2 (ref 43–95)
ECHO LV POSTERIOR WALL DIASTOLIC: 0.9 CM (ref 0.6–0.9)
ECHO LV RELATIVE WALL THICKNESS RATIO: 0.42
ECHO LVOT PEAK GRADIENT: 4 MMHG
ECHO LVOT PEAK VELOCITY: 1 M/S
ECHO MV A VELOCITY: 1.1 M/S
ECHO MV E DECELERATION TIME (DT): 151 MS
ECHO MV E VELOCITY: 0.85 M/S
ECHO MV E/A RATIO: 0.77
ECHO MV E/E' LATERAL: 8.95
ECHO MV E/E' RATIO (AVERAGED): 9.47
ECHO MV E/E' SEPTAL: 10
ECHO PV MAX VELOCITY: 0.9 M/S
ECHO PV PEAK GRADIENT: 3 MMHG
ECHO RIGHT VENTRICULAR SYSTOLIC PRESSURE (RVSP): 42 MMHG
ECHO RV INTERNAL DIMENSION: 2.5 CM
ECHO RV TAPSE: 2.1 CM (ref 1.7–?)
ECHO TV E WAVE: 0.6 M/S
ECHO TV REGURGITANT MAX VELOCITY: 2.81 M/S
ECHO TV REGURGITANT PEAK GRADIENT: 32 MMHG
ERYTHROCYTE [DISTWIDTH] IN BLOOD BY AUTOMATED COUNT: 14.7 % (ref 11.5–14.5)
GFR SERPL CREATININE-BSD FRML MDRD: 13 ML/MIN/1.73M2
GLUCOSE SERPL-MCNC: 82 MG/DL (ref 70–108)
HCT VFR BLD AUTO: 28.3 % (ref 37–47)
HGB BLD-MCNC: 8.6 GM/DL (ref 12–16)
MAGNESIUM SERPL-MCNC: 2.2 MG/DL (ref 1.6–2.4)
MCH RBC QN AUTO: 30.6 PG (ref 26–33)
MCHC RBC AUTO-ENTMCNC: 30.4 GM/DL (ref 32.2–35.5)
MCV RBC AUTO: 100.7 FL (ref 81–99)
PLATELET # BLD AUTO: 449 THOU/MM3 (ref 130–400)
PMV BLD AUTO: 9.4 FL (ref 9.4–12.4)
POTASSIUM SERPL-SCNC: 3.3 MEQ/L (ref 3.5–5.2)
RBC # BLD AUTO: 2.81 MILL/MM3 (ref 4.2–5.4)
SODIUM SERPL-SCNC: 137 MEQ/L (ref 135–145)
WBC # BLD AUTO: 9.9 THOU/MM3 (ref 4.8–10.8)

## 2024-12-23 PROCEDURE — 5A1D70Z PERFORMANCE OF URINARY FILTRATION, INTERMITTENT, LESS THAN 6 HOURS PER DAY: ICD-10-PCS | Performed by: INTERNAL MEDICINE

## 2024-12-23 PROCEDURE — 99232 SBSQ HOSP IP/OBS MODERATE 35: CPT | Performed by: INTERNAL MEDICINE

## 2024-12-23 PROCEDURE — 6370000000 HC RX 637 (ALT 250 FOR IP): Performed by: STUDENT IN AN ORGANIZED HEALTH CARE EDUCATION/TRAINING PROGRAM

## 2024-12-23 PROCEDURE — 6360000002 HC RX W HCPCS: Performed by: PHYSICIAN ASSISTANT

## 2024-12-23 PROCEDURE — 36415 COLL VENOUS BLD VENIPUNCTURE: CPT

## 2024-12-23 PROCEDURE — 80048 BASIC METABOLIC PNL TOTAL CA: CPT

## 2024-12-23 PROCEDURE — 85027 COMPLETE CBC AUTOMATED: CPT

## 2024-12-23 PROCEDURE — 93306 TTE W/DOPPLER COMPLETE: CPT | Performed by: INTERNAL MEDICINE

## 2024-12-23 PROCEDURE — 2500000003 HC RX 250 WO HCPCS: Performed by: PHYSICIAN ASSISTANT

## 2024-12-23 PROCEDURE — 83735 ASSAY OF MAGNESIUM: CPT

## 2024-12-23 PROCEDURE — 6370000000 HC RX 637 (ALT 250 FOR IP): Performed by: PHYSICIAN ASSISTANT

## 2024-12-23 PROCEDURE — 93306 TTE W/DOPPLER COMPLETE: CPT

## 2024-12-23 PROCEDURE — 99239 HOSP IP/OBS DSCHRG MGMT >30: CPT | Performed by: STUDENT IN AN ORGANIZED HEALTH CARE EDUCATION/TRAINING PROGRAM

## 2024-12-23 PROCEDURE — 97530 THERAPEUTIC ACTIVITIES: CPT

## 2024-12-23 RX ORDER — CEFDINIR 300 MG/1
300 CAPSULE ORAL DAILY
Qty: 3 CAPSULE | Refills: 0 | Status: SHIPPED | OUTPATIENT
Start: 2024-12-23 | End: 2024-12-26

## 2024-12-23 RX ORDER — POTASSIUM CHLORIDE 750 MG/1
10 TABLET, EXTENDED RELEASE ORAL DAILY
Qty: 14 TABLET | Refills: 1 | Status: SHIPPED | OUTPATIENT
Start: 2024-12-23 | End: 2024-12-23

## 2024-12-23 RX ORDER — CLONIDINE HYDROCHLORIDE 0.2 MG/1
0.2 TABLET ORAL 3 TIMES DAILY
Qty: 60 TABLET | Refills: 3 | Status: SHIPPED | OUTPATIENT
Start: 2024-12-23

## 2024-12-23 RX ORDER — POTASSIUM CHLORIDE 750 MG/1
10 TABLET, EXTENDED RELEASE ORAL DAILY
Qty: 4 TABLET | Refills: 0 | Status: SHIPPED | OUTPATIENT
Start: 2024-12-23

## 2024-12-23 RX ORDER — HYDRALAZINE HYDROCHLORIDE 25 MG/1
25 TABLET, FILM COATED ORAL EVERY 8 HOURS SCHEDULED
Qty: 90 TABLET | Refills: 3 | Status: SHIPPED | OUTPATIENT
Start: 2024-12-23

## 2024-12-23 RX ORDER — LEVOTHYROXINE SODIUM 25 UG/1
25 TABLET ORAL DAILY
Qty: 30 TABLET | Refills: 3 | Status: SHIPPED | OUTPATIENT
Start: 2024-12-24

## 2024-12-23 RX ORDER — POTASSIUM CHLORIDE 1500 MG/1
40 TABLET, EXTENDED RELEASE ORAL 2 TIMES DAILY WITH MEALS
Status: DISCONTINUED | OUTPATIENT
Start: 2024-12-23 | End: 2024-12-23 | Stop reason: HOSPADM

## 2024-12-23 RX ADMIN — ASPIRIN 81 MG: 81 TABLET, COATED ORAL at 10:41

## 2024-12-23 RX ADMIN — HYDRALAZINE HYDROCHLORIDE 25 MG: 25 TABLET ORAL at 05:49

## 2024-12-23 RX ADMIN — FOLIC ACID 500 MCG: 1 TABLET ORAL at 10:42

## 2024-12-23 RX ADMIN — CLONIDINE HYDROCHLORIDE 0.2 MG: 0.2 TABLET ORAL at 10:42

## 2024-12-23 RX ADMIN — CLOPIDOGREL BISULFATE 75 MG: 75 TABLET ORAL at 10:42

## 2024-12-23 RX ADMIN — CARBIDOPA AND LEVODOPA 1 TABLET: 10; 100 TABLET ORAL at 05:48

## 2024-12-23 RX ADMIN — SERTRALINE HYDROCHLORIDE 50 MG: 50 TABLET ORAL at 10:42

## 2024-12-23 RX ADMIN — HEPARIN SODIUM 5000 UNITS: 5000 INJECTION INTRAVENOUS; SUBCUTANEOUS at 05:49

## 2024-12-23 RX ADMIN — SODIUM CHLORIDE, PRESERVATIVE FREE 10 ML: 5 INJECTION INTRAVENOUS at 10:41

## 2024-12-23 RX ADMIN — SEVELAMER CARBONATE 800 MG: 800 TABLET, FILM COATED ORAL at 13:38

## 2024-12-23 RX ADMIN — SEVELAMER CARBONATE 800 MG: 800 TABLET, FILM COATED ORAL at 10:42

## 2024-12-23 RX ADMIN — BUPROPION HYDROCHLORIDE 300 MG: 300 TABLET, EXTENDED RELEASE ORAL at 10:41

## 2024-12-23 RX ADMIN — DOCUSATE SODIUM 100 MG: 50 LIQUID ORAL at 10:42

## 2024-12-23 RX ADMIN — CARBIDOPA AND LEVODOPA 1 TABLET: 10; 100 TABLET ORAL at 13:38

## 2024-12-23 RX ADMIN — POTASSIUM CHLORIDE 40 MEQ: 1500 TABLET, EXTENDED RELEASE ORAL at 10:42

## 2024-12-23 RX ADMIN — AMLODIPINE BESYLATE 10 MG: 10 TABLET ORAL at 10:41

## 2024-12-23 RX ADMIN — LEVOTHYROXINE SODIUM 25 MCG: 0.03 TABLET ORAL at 05:48

## 2024-12-23 NOTE — CARE COORDINATION
12/23/24, 8:54 AM EST    DISCHARGE ON GOING EVALUATION    Deloris L Watts       [facility] day: 3  Location: -03/003-A Reason for admit: Stroke-like symptoms [R29.90]  Urinary tract infection without hematuria, site unspecified [N39.0]  Altered mental status, unspecified altered mental status type [R41.82]  Pneumonia due to infectious organism, unspecified laterality, unspecified part of lung [J18.9]  Toxic metabolic encephalopathy [G92.8]  COVID-19 [U07.1]     Procedures: none    Imaging since last note: n/a     Barriers to Discharge: K+3.3, Hgb 8.6, covid+, UA abn. IV rocephin. HD MWF.     PCP: Johana Weldon MD  Readmission Risk Score: 31.7    Patient Goals/Plan/Treatment Preferences: return to [facility]. MWF HD.

## 2024-12-23 NOTE — PROGRESS NOTES
Discharge teaching and instructions for diagnosis/procedure of Toxic metabolic encephalopathy completed with patient using teachback method. AVS reviewed. Printed prescriptions given to patient. Patient voiced understanding regarding prescriptions, follow up appointments, and care of self at home. Discharged via cart/stretcher to  skilled nursing per  LACP.

## 2024-12-23 NOTE — PLAN OF CARE
Problem: Chronic Conditions and Co-morbidities  Goal: Patient's chronic conditions and co-morbidity symptoms are monitored and maintained or improved  Outcome: Progressing     Problem: Discharge Planning  Goal: Discharge to home or other facility with appropriate resources  Outcome: Progressing     Problem: Skin/Tissue Integrity  Goal: Absence of new skin breakdown  Outcome: Progressing     Problem: Safety - Adult  Goal: Free from fall injury  Outcome: Progressing     Problem: Pain  Goal: Verbalizes/displays adequate comfort level or baseline comfort level  Outcome: Progressing

## 2024-12-23 NOTE — PROGRESS NOTES
[facility]  INPATIENT PHYSICAL THERAPY  DAILY NOTE  [facility] RENAL TELEMETRY 6K - 6K-03/003-A      Discharge Recommendations: Subacte/Skilled Nursing Facility  Equipment Recommendations: No               Time In: 0946  Time Out: 1014  Timed Code Treatment Minutes: 28 Minutes  Minutes: 28          Date: 2024  Patient Name: Deloris Watts,  Gender:  female        MRN: [id number]  : 1955  (69 y.o.)     Referring Practitioner: Maxi Soriano PA-C  Diagnosis: Toxic metabolic encephalopathy  Additional Pertinent Hx: 69 y.o. female with a history of end-stage renal disease on hemodialysis, tremor, anemia and CKD, hypertension, constipation, depression, and right MCA stroke who presented to [facility] with chief complaint of altered mental status.  History is provided by the patient's  who is present at bedside.  The patient currently lives in a nursing facility.  Over the last week or so, the patient has had altered mental status.  She has been having some visual hallucinations and has been having some nonsensical speech.  She missed dialysis Wednesday as well as today.  There was concern for strokelike symptoms today, but an MRI of the brain was negative for acute findings.  She does have a chronic right-sided MCA stroke.  The patient was found to be COVID-positive.  She was also found to have a UA consistent with a urinary tract infection.  The patient's  does report she has been having a productive cough, but she has not complained of any urinary symptoms.  On exam currently, she displays nonsensical speech.  She was unable to contribute to a history or review of systems.     Prior Level of Function:  Lives With: Other (Comment)  Type of Home: Facility  Home Layout: One level  Home Access: Level entry  Home Equipment: Walker - Rolling   Bathroom Shower/Tub: Walk-in shower  Bathroom Toilet: Handicap height  Bathroom Equipment: Grab bars in shower, Shower chair, Grab bars around

## 2024-12-23 NOTE — DISCHARGE SUMMARY
Hospital Medicine Discharge Summary      Patient Identification:   Deloris Watts   : 1955  MRN: [id number]   Account: [id number]   Patient's PCP: Johana Weldon MD    Admit Date: 2024   Discharge Date:  2024    Admitting Physician: No admitting provider for patient encounter.  Discharge Physician: Edward Fitzgerald MD       Discharge Diagnoses:    Acute encephalopathy, resolved multifactorial toxic, polypharmacy, hypertensive and UTI.  Improving.  MRI negative.  Neurology initially was consulted due to concern for stroke.  Negative  Accelerated hypertension.  Better controlled.  Patient was started on hydralazine 25 mg 3 times daily, clonidine was increased to 0.2,   Continue amlodipine 10 mg  Anion gap metabolic acidosis.  Resolved secondary to missed dialysis/ESRD.  Subclinical hypothyroidism, given confusion started on low-dose Synthroid.  Continue discharge  Schizoaffective disorder.    Constipation.  Continue Linzess as needed.  COVID-19.  Supportive care.  No concern for superimposed bacterial pneumonia.  UTI.  Urine culture grew Proteus, sensitive to Rocephin, continue   ESRD likely secondary to hypertensive nephropathy.  HD on .  Appreciate nephrology assistance.  Undergoing dialysis.  Parkinson's/tremor.  Continue sentiment.  Outpatient follow-up with neurology  History of CVA.  Continue DAPT.  SLP evaluation      Hospital Course:   \"Deloris Watts is a 69 y.o. female with a history of end-stage renal disease on hemodialysis, tremor, anemia and CKD, hypertension, constipation, depression, and right MCA stroke who presented to [facility] with chief complaint of altered mental status. History is provided by the patient's  who is present at bedside. The patient currently lives in a nursing facility. Over the last week or so, the patient has had altered mental status. She has been having some visual hallucinations and has been having some nonsensical  test strips  Test 1-2x/day.     buPROPion 300 MG extended release tablet  Commonly known as: WELLBUTRIN XL  Take 1 tablet by mouth daily     carbidopa-levodopa  MG per tablet  Commonly known as: SINEMET  Take 1 tablet by mouth 3 times daily At 7 am, 12 noon and 5 pm     clopidogrel 75 MG tablet  Commonly known as: PLAVIX  Take 1 tablet by mouth daily     docusate 50 MG/5ML liquid  Commonly known as: COLACE  Take 10 mLs by mouth in the morning, at noon, and at bedtime ; hold if diarrhea     fluticasone 50 MCG/ACT nasal spray  Commonly known as: FLONASE  2 sprays by Each Nostril route daily     folic acid 1 MG tablet  Commonly known as: FOLVITE  Take 0.5 tablets by mouth daily     guaiFENesin 600 MG extended release tablet  Commonly known as: MUCINEX     HM Lidocaine Patch 4 % external patch  Generic drug: lidocaine     linaclotide 145 MCG capsule  Commonly known as: LINZESS     meclizine 25 MG tablet  Commonly known as: ANTIVERT     mirtazapine 15 MG tablet  Commonly known as: REMERON     polyethylene glycol 17 g packet  Commonly known as: GLYCOLAX  Take 1 packet by mouth daily ; hold if diarrhea     PROBIOTIC DAILY PO     senna 8.6 MG tablet  Commonly known as: SENOKOT  Take 2 tablets by mouth nightly ; hold if diarrhea     sertraline 50 MG tablet  Commonly known as: ZOLOFT  Take 1 tablet by mouth daily     sevelamer 800 MG tablet  Commonly known as: RENVELA     traZODone 100 MG tablet  Commonly known as: DESYREL  Take 1 tablet by mouth nightly For sleep            STOP taking these medications      calcium acetate 667 MG Tabs     cetirizine 5 MG tablet  Commonly known as: ZYRTEC     doxazosin 2 MG tablet  Commonly known as: CARDURA     lactulose 10 GM/15ML solution  Commonly known as: CHRONULAC     megestrol 40 MG/ML suspension  Commonly known as: MEGACE               Where to Get Your Medications        These medications were sent to [id number] - [place], [place] - [address] - P [phone] - F

## 2024-12-23 NOTE — PLAN OF CARE
Problem: Chronic Conditions and Co-morbidities  Goal: Patient's chronic conditions and co-morbidity symptoms are monitored and maintained or improved  12/23/2024 1041 by Xena Benson RN  Outcome: Completed  12/23/2024 0234 by Celso Ulloa RN  Outcome: Progressing     Problem: Discharge Planning  Goal: Discharge to home or other facility with appropriate resources  12/23/2024 1041 by Xena Benson RN  Outcome: Completed  12/23/2024 0234 by Celso Ulloa RN  Outcome: Progressing     Problem: Skin/Tissue Integrity  Goal: Absence of new skin breakdown  Description: 1.  Monitor for areas of redness and/or skin breakdown  2.  Assess vascular access sites hourly  3.  Every 4-6 hours minimum:  Change oxygen saturation probe site  4.  Every 4-6 hours:  If on nasal continuous positive airway pressure, respiratory therapy assess nares and determine need for appliance change or resting period.  12/23/2024 1041 by Xena Benson RN  Outcome: Completed  12/23/2024 0234 by Celso Ulloa RN  Outcome: Progressing     Problem: Safety - Adult  Goal: Free from fall injury  12/23/2024 1041 by Xena Benson RN  Outcome: Completed  12/23/2024 0234 by Celso Ulloa RN  Outcome: Progressing     Problem: Pain  Goal: Verbalizes/displays adequate comfort level or baseline comfort level  12/23/2024 1041 by Xena Benson RN  Outcome: Completed  12/23/2024 0234 by Celso Ulloa RN  Outcome: Progressing   Care plan reviewed with patient. Patient verbalizes understanding of plan of care and contributes to goal setting.

## 2024-12-23 NOTE — PROGRESS NOTES
[facility]   Nephrology progress note  12/23/2024, 11:07 AM      Pt Name:    Deloris Watts  MRN:     [id number]     YOB: 1955  Admit Date:    12/20/2024  2:55 PM    Chief Complaint: Nephrology following for ESRD on hemodialysis.    Subjective:  Patient seen and examined  No chest pain or shortness of breath  Mental status back to normal    Objective:  24HR INTAKE/OUTPUT:    Intake/Output Summary (Last 24 hours) at 12/23/2024 1107  Last data filed at 12/23/2024 0901  Gross per 24 hour   Intake 0 ml   Output 250 ml   Net -250 ml      Admission weight: 55.9 kg (123 lb 4 oz)  Wt Readings from Last 3 Encounters:   12/23/24 54.6 kg (120 lb 5.9 oz)   11/21/24 52.5 kg (115 lb 11.9 oz)   09/27/24 49 kg (108 lb)        Vitals :   Vitals:    12/22/24 2118 12/23/24 0002 12/23/24 0027 12/23/24 0355   BP: (!) 143/72 (!) 178/72 (!) 162/74 (!) 143/71   Pulse: 92 92  83   Resp: 18 18     Temp: 98.8 °F (37.1 °C) 98.1 °F (36.7 °C)  98.4 °F (36.9 °C)   TempSrc: Oral Oral  Oral   SpO2: 96% 94%  93%   Weight:    54.6 kg (120 lb 5.9 oz)   Height:           Physical examination  General Appearance:  Well developed. No distress  Mouth/Throat:  Oral mucosa moist  Neck:  Supple, no JVD  Lungs:  Breath sounds: clear  Heart::  S1,S2 heard  Abdomen:  Soft, non - tender  Musculoskeletal:  Edema -no edema    Medications:  Infusion:    sodium chloride       Meds:    potassium chloride  40 mEq Oral BID WC    hydrALAZINE  25 mg Oral 3 times per day    cloNIDine  0.2 mg Oral TID    sertraline  50 mg Oral Daily    labetalol  5 mg IntraVENous Once    folic acid  500 mcg Oral Daily    mirtazapine  15 mg Oral Nightly    levothyroxine  25 mcg Oral Daily    sodium chloride flush  5-40 mL IntraVENous 2 times per day    heparin (porcine)  5,000 Units SubCUTAneous 3 times per day    amLODIPine  10 mg Oral Daily    aspirin  81 mg Oral Daily    buPROPion  300 mg Oral Daily    carbidopa-levodopa  1 tablet Oral TID

## 2024-12-23 NOTE — PLAN OF CARE
Problem: Discharge Planning  Goal: Discharge to home or other facility with appropriate resources  Recent Flowsheet Documentation  Taken 12/23/2024 1138 by Paula Foley, MSW, LSW  Discharge to home or other facility with appropriate resources: Identify barriers to discharge with patient and caregiver

## 2024-12-25 LAB
BACTERIA BLD AEROBE CULT: NORMAL
BACTERIA BLD AEROBE CULT: NORMAL

## 2025-02-27 NOTE — PROGRESS NOTES
Comprehensive Nutrition Assessment    Type and Reason for Visit:  Reassess, Calorie Count    Nutrition Recommendations/Plan:   Recommend diet as per SLP.  Continue with Magic Cups x 2 TID; Ensure Plus once/day; yogurt w/ meals.  Recommend continue w/ Megace for appetite stimulation (note dose increased).  Calorie count completed - pt averages ~500 kcals, 17 gm protein per meal (majority from ONS) - meets low end of energy needs.  Recommend MVI.  Encouraged po, good nutrition at best efforts.     Malnutrition Assessment:  Malnutrition Status:  Severe malnutrition (07/31/24 1152)    Context:  Chronic Illness     Findings of the 6 clinical characteristics of malnutrition:  Energy Intake:  75% or less estimated energy requirements for 1 month or longer  Weight Loss:  No significant weight loss     Body Fat Loss:  Severe body fat loss Orbital, Triceps, Buccal region   Muscle Mass Loss:  Severe muscle mass loss Temples (temporalis)  Fluid Accumulation:  No significant fluid accumulation     Strength:  Not Performed    Nutrition Assessment:     Pt. severely malnourished AEB criteria listed above. At risk for further nutritional compromise r/t admitted to [facility] s/p fall resulting in right hip fracture- went to the OR on 7/26. MBS on 7/30 recommended pureed with thin liquids; increased nutrient needs for wound healing; known losses with dialysis. Noted underlying medical condition (PMHx anemia in CKD- on HD, started in 1/2023, CHF, CVA in 1990s, depression, fibromyalgia, HLD, HTN, IBS, osteoporosis).     Nutrition Related Findings:    Pt. Report/Treatments/Miscellaneous:   8/5 pt. Seen w/ spouse while eating breakfast; reports appetite is a little better; denies any trouble tolerating diet; taking ONS well; typically consumes ~ 1 Magic Cup/meal; sips on Ensures; SLP following; plan ECF at discharge  8/2: pt. Seen in HD; awake and alert but difficult to understand - mumbles at times; states mainly consuming ONS; not eating  Discharge Notes:  Patient is medically cleared for discharge home by Lupe Ramirez MD with written discharge instructions, follow-up information, and return precautions reviewed and given to  [facility] and [facility] , and they verbalized good understanding.  Patient is AOx0, in no apparent distress.  Out per cart. All questions answered and concerns addressed with the  [facility] and [facility] .     High Calorie/High Protein Oral Supplement  ADULT ORAL NUTRITION SUPPLEMENT; Breakfast, Lunch, Dinner; Frozen Oral Supplement  ADULT ORAL NUTRITION SUPPLEMENT; Breakfast, Lunch, Dinner; Other Oral Supplement; yogurt  ADULT DIET; Dysphagia - Minced and Moist    Anthropometric Measures:  Height: 152.4 cm (5')  Ideal Body Weight (IBW): 100 lbs (45 kg)    Admission Body Weight: 49 kg (108 lb) (7/30, no edema)  Current Body Weight: 50.5 kg (111 lb 5.3 oz) (8/2 no edema),    Weight Source: Bed Scale  Current BMI (kg/m2): 21.7  Usual Body Weight:  (6/14/24: 91 lb; 3/30/24: 86 lb; 3/15/24: 93 lb; 1/23/24: 104 lb; 12/27/23: 109 lb; 9/20/23: 94 lb; 8/28/23: 98 lb)     Weight Adjustment For: No Adjustment                 BMI Categories: Underweight (BMI less than 22) age over 65    Estimated Daily Nutrient Needs:  Energy Requirements Based On: Kcal/kg  Weight Used for Energy Requirements: Admission  Energy (kcal/day): 5574-1846 kcal (30-35 kcal/kg)  Weight Used for Protein Requirements: Admission  Protein (g/day): 59-98 g (1.2-2 g/kg)    Nutrition Diagnosis:   Severe malnutrition, In context of chronic illness related to inadequate protein-energy intake as evidenced by Criteria as identified in malnutrition assessment    Nutrition Interventions:   Food and/or Nutrient Delivery: Continue Current Diet, Continue Oral Nutrition Supplement  Nutrition Education/Counseling: Education initiated  Coordination of Nutrition Care: Continue to monitor while inpatient       Goals:  Previous Goal Met: Progressing toward Goal(s)  Goals: PO intake 75% or greater, by next RD assessment       Nutrition Monitoring and Evaluation:   Behavioral-Environmental Outcomes: None Identified  Food/Nutrient Intake Outcomes: Diet Advancement/Tolerance, Food and Nutrient Intake, Supplement Intake  Physical Signs/Symptoms Outcomes: Biochemical Data, Chewing or Swallowing, GI Status, Fluid Status or Edema, Nutrition Focused Physical Findings, Skin,

## 2025-04-09 NOTE — PLAN OF CARE
Problem: Safety - Adult  Goal: Free from fall injury  Outcome: Progressing  Flowsheets  Taken 5/31/2024 0027 by Yessica Fu LPN  Free From Fall Injury: Instruct family/caregiver on patient safety  Taken 5/30/2024 1057 by Juan Key RN  Free From Fall Injury: Instruct family/caregiver on patient safety     Problem: ABCDS Injury Assessment  Goal: Absence of physical injury  Outcome: Progressing  Flowsheets  Taken 5/31/2024 0027 by Yessica Fu LPN  Absence of Physical Injury: Implement safety measures based on patient assessment  Problem: Neurosensory - Adult  Goal: Absence of seizures  Outcome: Progressing  Flowsheets (Taken 5/31/2024 0027)  Absence of seizures:   Monitor for seizure activity.  If seizure occurs, document type and location of movements and any associated apnea   Administer anticonvulsants as ordered   If seizure occurs, turn head to side and suction secretions as needed     Problem: Musculoskeletal - Adult  Goal: Return mobility to safest level of function  Outcome: Progressing  Flowsheets (Taken 5/31/2024 0027)  Return Mobility to Safest Level of Function:   Assess patient stability and activity tolerance for standing, transferring and ambulating with or without assistive devices   Assist with transfers and ambulation using safe patient handling equipment as needed     Problem: Genitourinary - Adult  Goal: Absence of urinary retention  Outcome: Progressing  Flowsheets (Taken 5/31/2024 0027)  Absence of urinary retention: Assess patient’s ability to void and empty bladder     Problem: Chronic Conditions and Co-morbidities  Goal: Patient's chronic conditions and co-morbidity symptoms are monitored and maintained or improved  Outcome: Progressing  Flowsheets (Taken 5/31/2024 0027)  Care Plan - Patient's Chronic Conditions and Co-Morbidity Symptoms are Monitored and Maintained or Improved:   Monitor and assess patient's chronic conditions and comorbid symptoms for stability,  Electrodesiccation And Curettage Text: The wound bed was treated with electrodesiccation and curettage after the biopsy was performed.

## 2025-06-06 ENCOUNTER — APPOINTMENT (OUTPATIENT)
Dept: CT IMAGING | Age: 70
End: 2025-06-06
Payer: MEDICARE

## 2025-06-06 ENCOUNTER — APPOINTMENT (OUTPATIENT)
Dept: GENERAL RADIOLOGY | Age: 70
End: 2025-06-06
Payer: MEDICARE

## 2025-06-06 ENCOUNTER — HOSPITAL ENCOUNTER (EMERGENCY)
Age: 70
Discharge: HOME | End: 2025-06-06
Attending: EMERGENCY MEDICINE
Payer: MEDICARE

## 2025-06-06 VITALS
TEMPERATURE: 97.7 F | HEART RATE: 76 BPM | OXYGEN SATURATION: 99 % | SYSTOLIC BLOOD PRESSURE: 150 MMHG | DIASTOLIC BLOOD PRESSURE: 74 MMHG | HEIGHT: 60 IN | RESPIRATION RATE: 12 BRPM | WEIGHT: 150 LBS | BODY MASS INDEX: 29.45 KG/M2
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
## 2025-06-06 DIAGNOSIS — M54.50 ACUTE MIDLINE LOW BACK PAIN WITHOUT SCIATICA: ICD-10-CM

## 2025-06-06 LAB
ALBUMIN SERPL BCG-MCNC: 4.1 G/DL (ref 3.4–4.9)
ALP SERPL-CCNC: 127 U/L (ref 38–126)
ALT SERPL W/O P-5'-P-CCNC: < 5 U/L (ref 10–35)
ANION GAP SERPL CALC-SCNC: 19 MEQ/L (ref 8–16)
AST SERPL-CCNC: 20 U/L (ref 10–35)
BASOPHILS ABSOLUTE: 0.1 THOU/MM3 (ref 0–0.1)
BASOPHILS NFR BLD AUTO: 1 %
BILIRUB SERPL-MCNC: 0.3 MG/DL (ref 0.3–1.2)
BUN SERPL-MCNC: 52 MG/DL (ref 8–23)
CALCIUM SERPL-MCNC: 10.3 MG/DL (ref 8.8–10.2)
CHLORIDE SERPL-SCNC: 94 MEQ/L (ref 98–111)
CO2 SERPL-SCNC: 23 MEQ/L (ref 22–29)
CREAT SERPL-MCNC: 6.7 MG/DL (ref 0.5–0.9)
DEPRECATED RDW RBC AUTO: 52 FL (ref 35–45)
EKG ATRIAL RATE: 80 BPM
EKG P AXIS: 66 DEGREES
EKG P-R INTERVAL: 142 MS
EKG Q-T INTERVAL: 374 MS
EKG QRS DURATION: 114 MS
EKG QTC CALCULATION (BAZETT): 431 MS
EKG R AXIS: 64 DEGREES
EKG T AXIS: 56 DEGREES
EKG VENTRICULAR RATE: 80 BPM
EOSINOPHIL NFR BLD AUTO: 2.6 %
EOSINOPHILS ABSOLUTE: 0.2 THOU/MM3 (ref 0–0.4)
ERYTHROCYTE [DISTWIDTH] IN BLOOD BY AUTOMATED COUNT: 13.7 % (ref 11.5–14.5)
GFR SERPL CREATININE-BSD FRML MDRD: 6 ML/MIN/1.73M2
GLUCOSE SERPL-MCNC: 96 MG/DL (ref 74–109)
HCT VFR BLD AUTO: 38.8 % (ref 37–47)
HGB BLD-MCNC: 12.2 GM/DL (ref 12–16)
IMM GRANULOCYTES # BLD AUTO: 0.23 THOU/MM3 (ref 0–0.07)
IMM GRANULOCYTES NFR BLD AUTO: 2.6 %
LYMPHOCYTES ABSOLUTE: 1.2 THOU/MM3 (ref 1–4.8)
LYMPHOCYTES NFR BLD AUTO: 13.9 %
MCH RBC QN AUTO: 32.6 PG (ref 26–33)
MCHC RBC AUTO-ENTMCNC: 31.4 GM/DL (ref 32.2–35.5)
MCV RBC AUTO: 103.7 FL (ref 81–99)
MONOCYTES ABSOLUTE: 0.8 THOU/MM3 (ref 0.4–1.3)
MONOCYTES NFR BLD AUTO: 8.9 %
NEUTROPHILS ABSOLUTE: 6.3 THOU/MM3 (ref 1.8–7.7)
NEUTROPHILS NFR BLD AUTO: 71 %
NRBC BLD AUTO-RTO: 0 /100 WBC
OSMOLALITY SERPL CALC.SUM OF ELEC: 285.9 MOSMOL/KG (ref 275–300)
PLATELET # BLD AUTO: 294 THOU/MM3 (ref 130–400)
PMV BLD AUTO: 9.7 FL (ref 9.4–12.4)
POTASSIUM SERPL-SCNC: 4.6 MEQ/L (ref 3.5–5.2)
PROT SERPL-MCNC: 7.2 G/DL (ref 6.4–8.3)
RBC # BLD AUTO: 3.74 MILL/MM3 (ref 4.2–5.4)
SODIUM SERPL-SCNC: 136 MEQ/L (ref 135–145)
WBC # BLD AUTO: 8.9 THOU/MM3 (ref 4.8–10.8)

## 2025-06-06 PROCEDURE — 85025 COMPLETE CBC W/AUTO DIFF WBC: CPT

## 2025-06-06 PROCEDURE — 72128 CT CHEST SPINE W/O DYE: CPT

## 2025-06-06 PROCEDURE — 70450 CT HEAD/BRAIN W/O DYE: CPT

## 2025-06-06 PROCEDURE — 36415 COLL VENOUS BLD VENIPUNCTURE: CPT

## 2025-06-06 PROCEDURE — 72125 CT NECK SPINE W/O DYE: CPT

## 2025-06-06 PROCEDURE — 99285 EMERGENCY DEPT VISIT HI MDM: CPT

## 2025-06-06 PROCEDURE — 93010 ELECTROCARDIOGRAM REPORT: CPT | Performed by: INTERNAL MEDICINE

## 2025-06-06 PROCEDURE — 6370000000 HC RX 637 (ALT 250 FOR IP)

## 2025-06-06 PROCEDURE — 93005 ELECTROCARDIOGRAM TRACING: CPT

## 2025-06-06 PROCEDURE — 80053 COMPREHEN METABOLIC PANEL: CPT

## 2025-06-06 PROCEDURE — 71045 X-RAY EXAM CHEST 1 VIEW: CPT

## 2025-06-06 PROCEDURE — 72170 X-RAY EXAM OF PELVIS: CPT

## 2025-06-06 PROCEDURE — 72131 CT LUMBAR SPINE W/O DYE: CPT

## 2025-06-06 RX ORDER — HYDROCODONE BITARTRATE AND ACETAMINOPHEN 5; 325 MG/1; MG/1
1 TABLET ORAL ONCE
Status: COMPLETED | OUTPATIENT
Start: 2025-06-06 | End: 2025-06-06

## 2025-06-06 RX ADMIN — HYDROCODONE BITARTRATE AND ACETAMINOPHEN 1 TABLET: 5; 325 TABLET ORAL at 14:39

## 2025-06-06 ASSESSMENT — PAIN SCALES - GENERAL
PAINLEVEL_OUTOF10: 8
PAINLEVEL_OUTOF10: 8

## 2025-06-06 ASSESSMENT — PAIN DESCRIPTION - ORIENTATION: ORIENTATION: LOWER

## 2025-06-06 ASSESSMENT — PAIN - FUNCTIONAL ASSESSMENT
PAIN_FUNCTIONAL_ASSESSMENT: 0-10
PAIN_FUNCTIONAL_ASSESSMENT: 0-10

## 2025-06-06 ASSESSMENT — PAIN DESCRIPTION - LOCATION: LOCATION: BACK

## 2025-06-06 NOTE — DISCHARGE INSTRUCTIONS
Return to the emergency department immediately for any change or worsening symptoms including but not limited to chest pain, shortness of breath, dizziness, nausea vomiting for  Please follow-up with your primary care physician.

## 2025-06-06 NOTE — ED PROVIDER NOTES
[facility] EMERGENCY DEPARTMENT  EMERGENCY DEPARTMENT ENCOUNTER          Pt Name: Deloris Watts  MRN: [id number]  Birthdate 1955  Date of evaluation: 6/6/2025  Physician: Ever Suarez MD  Supervising Attending Physician: Roscoe Moore DO       CHIEF COMPLAINT       Chief Complaint   Patient presents with    Fall    Lower Back Pain         HISTORY OF PRESENT ILLNESS    HPI  Deloris Watts is a 69 y.o. female who presents to the emergency department from home, as a walk in to the ED lobby for evaluation of back pain.  Patient reports that earlier this morning she standing in the bathroom getting ready to go to dialysis.  Patient's  reports that she was standing using her walker.  He states that he was assisting her with putting on a jacket.  She reports that she then lost her balance falling backwards.   reports that she fell flat on the ground.  She is unsure if she hit her head or not.  She does report lower back pain since then.  It was reported that she was going to dialysis whenever the bumps in the road were causing her severe back pain.  Due to this she wanted come to the emergency department for further evaluation.  She denies any numbness or tingling in her extremities.  She denies any pain going down into the leg.  She denies any pain in her head or neck.  Denies any chest pain shortness of breath.  Denies any lightheadedness or dizziness prior to the fall.  She reports that she does take Plavix.  The patient has no other acute complaints at this time.            PAST MEDICAL AND SURGICAL HISTORY     Past Medical History:   Diagnosis Date    Allergic rhinitis     Anemia in CKD (chronic kidney disease)     Anxiety     CHF (congestive heart failure) ([facility])     Closed fracture of right proximal humerus 09/18/2021    ORIF    Closed right ankle fracture     In 1990s; treated with casting    CVA (cerebral infarction)     in 1990s ; with left-sided weakness     Take 1 tablet by mouth every 8 hours, Disp: 90 tablet, Rfl: 3    levothyroxine (SYNTHROID) 25 MCG tablet, Take 1 tablet by mouth Daily, Disp: 30 tablet, Rfl: 3    potassium chloride (KLOR-CON M) 10 MEQ extended release tablet, Take 1 tablet by mouth daily, Disp: 4 tablet, Rfl: 0    albuterol (PROVENTIL) (2.5 MG/3ML) 0.083% nebulizer solution, Take 3 mLs by nebulization every 4 hours as needed for Wheezing, Disp: , Rfl:     mirtazapine (REMERON) 15 MG tablet, Take 1 tablet by mouth nightly, Disp: , Rfl:     lidocaine (HM LIDOCAINE PATCH) 4 % external patch, Place 1 patch onto the skin daily Apply to lower back in AM and remove at PM, Disp: , Rfl:     acetaminophen (TYLENOL) 325 MG tablet, Take 2 tablets by mouth every 6 hours as needed for Pain, Disp: , Rfl:     meclizine (ANTIVERT) 25 MG tablet, Take 2 tablets by mouth every 4 hours as needed for Nausea, Disp: , Rfl:     guaiFENesin (MUCINEX) 600 MG extended release tablet, Take 2 tablets by mouth 2 times daily as needed for Congestion, Disp: , Rfl:     sevelamer (RENVELA) 800 MG tablet, Take 1 tablet by mouth 3 times daily (with meals), Disp: , Rfl:     folic acid (FOLVITE) 1 MG tablet, Take 0.5 tablets by mouth daily, Disp: 30 tablet, Rfl: 3    docusate (COLACE) 50 MG/5ML liquid, Take 10 mLs by mouth in the morning, at noon, and at bedtime ; hold if diarrhea, Disp: , Rfl:     polyethylene glycol (GLYCOLAX) 17 g packet, Take 1 packet by mouth daily ; hold if diarrhea, Disp: 527 g, Rfl: 1    senna (SENOKOT) 8.6 MG tablet, Take 2 tablets by mouth nightly ; hold if diarrhea, Disp: 60 tablet, Rfl: 3    aspirin 81 MG EC tablet, Take 1 tablet by mouth daily, Disp: , Rfl:     b complex vitamins capsule, Take 1 capsule by mouth daily, Disp: , Rfl:     buPROPion (WELLBUTRIN XL) 300 MG extended release tablet, Take 1 tablet by mouth daily, Disp: 90 tablet, Rfl: 1    sertraline (ZOLOFT) 50 MG tablet, Take 1 tablet by mouth daily, Disp: 90 tablet, Rfl: 1    clopidogrel

## 2025-06-06 NOTE — ED TRIAGE NOTES
Pt presents to the ER following a fall. Pt's  states he was helping her get dressed for dialysis when she fell backwards. Pt denies hitting her head or LOC. Pt c/o lower back pain. Pt did not receive her dialysis treatment. Pt is alert and oriented, respirations even and unlabored. VSS

## 2025-06-06 NOTE — ED NOTES
Pt and  updated on POC. Pt medicated per MAR. No needs expressed at this time. Respirations even and unlabored, call light within reach. VSS

## 2025-07-22 ENCOUNTER — TRANSCRIBE ORDERS (OUTPATIENT)
Dept: ADMINISTRATIVE | Age: 70
End: 2025-07-22

## 2025-07-22 DIAGNOSIS — S32.030A CLOSED COMPRESSION FRACTURE OF L3 LUMBAR VERTEBRA, INITIAL ENCOUNTER (HCC): ICD-10-CM

## 2025-07-22 DIAGNOSIS — M54.50 LUMBAR PAIN: ICD-10-CM

## 2025-07-22 DIAGNOSIS — M51.362 OTHER INTERVERTEBRAL DISC DEGENERATION, LUMBAR REGION WITH DISCOGENIC BACK PAIN AND LOWER EXTREMITY PAIN: Primary | ICD-10-CM

## 2025-08-14 ENCOUNTER — HOSPITAL ENCOUNTER (EMERGENCY)
Age: 70
Discharge: HOME | End: 2025-08-14
Attending: EMERGENCY MEDICINE
Payer: MEDICARE

## 2025-08-14 ENCOUNTER — APPOINTMENT (OUTPATIENT)
Dept: CT IMAGING | Age: 70
End: 2025-08-14
Payer: MEDICARE

## 2025-08-14 VITALS
RESPIRATION RATE: 19 BRPM | HEIGHT: 60 IN | WEIGHT: 120 LBS | OXYGEN SATURATION: 98 % | TEMPERATURE: 97.7 F | DIASTOLIC BLOOD PRESSURE: 71 MMHG | SYSTOLIC BLOOD PRESSURE: 148 MMHG | HEART RATE: 84 BPM | BODY MASS INDEX: 23.56 KG/M2

## 2025-08-14 DIAGNOSIS — S32.030A COMPRESSION FRACTURE OF L3 LUMBAR VERTEBRA, CLOSED, INITIAL ENCOUNTER (HCC): ICD-10-CM

## 2025-08-14 DIAGNOSIS — S22.000A COMPRESSION FRACTURE OF BODY OF THORACIC VERTEBRA (HCC): Primary | ICD-10-CM

## 2025-08-14 LAB
EKG ATRIAL RATE: 80 BPM
EKG P AXIS: 46 DEGREES
EKG P-R INTERVAL: 146 MS
EKG Q-T INTERVAL: 388 MS
EKG QRS DURATION: 112 MS
EKG QTC CALCULATION (BAZETT): 447 MS
EKG R AXIS: 64 DEGREES
EKG T AXIS: 31 DEGREES
EKG VENTRICULAR RATE: 80 BPM

## 2025-08-14 PROCEDURE — 72125 CT NECK SPINE W/O DYE: CPT

## 2025-08-14 PROCEDURE — 72128 CT CHEST SPINE W/O DYE: CPT

## 2025-08-14 PROCEDURE — 72131 CT LUMBAR SPINE W/O DYE: CPT

## 2025-08-14 PROCEDURE — 99284 EMERGENCY DEPT VISIT MOD MDM: CPT

## 2025-08-14 PROCEDURE — 70450 CT HEAD/BRAIN W/O DYE: CPT

## 2025-08-14 PROCEDURE — 93010 ELECTROCARDIOGRAM REPORT: CPT | Performed by: NUCLEAR MEDICINE

## 2025-08-14 PROCEDURE — 93005 ELECTROCARDIOGRAM TRACING: CPT | Performed by: EMERGENCY MEDICINE

## 2025-08-14 RX ORDER — HYDROCODONE BITARTRATE AND ACETAMINOPHEN 5; 325 MG/1; MG/1
.5-1 TABLET ORAL EVERY 6 HOURS PRN
Qty: 8 TABLET | Refills: 0 | Status: SHIPPED | OUTPATIENT
Start: 2025-08-14 | End: 2025-08-17

## 2025-08-14 ASSESSMENT — PAIN - FUNCTIONAL ASSESSMENT
PAIN_FUNCTIONAL_ASSESSMENT: 0-10
PAIN_FUNCTIONAL_ASSESSMENT: WONG-BAKER FACES

## 2025-08-14 ASSESSMENT — PAIN SCALES - GENERAL: PAINLEVEL_OUTOF10: 6

## 2025-08-14 ASSESSMENT — PAIN SCALES - WONG BAKER
WONGBAKER_NUMERICALRESPONSE: NO HURT
WONGBAKER_NUMERICALRESPONSE: HURTS A LITTLE BIT
WONGBAKER_NUMERICALRESPONSE: HURTS LITTLE MORE
WONGBAKER_NUMERICALRESPONSE: HURTS LITTLE MORE

## 2025-08-15 ENCOUNTER — CARE COORDINATION (OUTPATIENT)
Dept: CASE MANAGEMENT | Age: 70
End: 2025-08-15

## 2025-08-18 ENCOUNTER — CARE COORDINATION (OUTPATIENT)
Dept: CASE MANAGEMENT | Age: 70
End: 2025-08-18

## 2025-08-19 ENCOUNTER — HOSPITAL ENCOUNTER (OUTPATIENT)
Dept: MRI IMAGING | Age: 70
Discharge: HOME | End: 2025-08-19
Attending: ORTHOPAEDIC SURGERY
Payer: MEDICARE

## 2025-08-19 DIAGNOSIS — M54.50 LUMBAR PAIN: ICD-10-CM

## 2025-08-19 DIAGNOSIS — M51.362 OTHER INTERVERTEBRAL DISC DEGENERATION, LUMBAR REGION WITH DISCOGENIC BACK PAIN AND LOWER EXTREMITY PAIN: ICD-10-CM

## 2025-08-19 DIAGNOSIS — S32.030A CLOSED COMPRESSION FRACTURE OF L3 LUMBAR VERTEBRA, INITIAL ENCOUNTER (HCC): ICD-10-CM

## 2025-08-19 PROCEDURE — 72148 MRI LUMBAR SPINE W/O DYE: CPT

## 2025-08-29 ENCOUNTER — HOSPITAL ENCOUNTER (INPATIENT)
Age: 70
LOS: 5 days | Discharge: NURSING, REHAB OR LONG-TERM CARE | DRG: 515 | End: 2025-09-04
Attending: STUDENT IN AN ORGANIZED HEALTH CARE EDUCATION/TRAINING PROGRAM | Admitting: PHYSICIAN ASSISTANT
Payer: MEDICARE

## 2025-08-29 DIAGNOSIS — F41.9 ANXIETY AND DEPRESSION: ICD-10-CM

## 2025-08-29 DIAGNOSIS — G89.29 ACUTE EXACERBATION OF CHRONIC LOW BACK PAIN: Primary | ICD-10-CM

## 2025-08-29 DIAGNOSIS — M54.50 ACUTE EXACERBATION OF CHRONIC LOW BACK PAIN: Primary | ICD-10-CM

## 2025-08-29 DIAGNOSIS — F32.A ANXIETY AND DEPRESSION: ICD-10-CM

## 2025-08-29 PROCEDURE — 99285 EMERGENCY DEPT VISIT HI MDM: CPT

## 2025-08-29 RX ORDER — ACETAMINOPHEN 325 MG/1
650 TABLET ORAL ONCE
Status: COMPLETED | OUTPATIENT
Start: 2025-08-30 | End: 2025-08-30

## 2025-08-29 RX ORDER — HYDROCODONE BITARTRATE AND ACETAMINOPHEN 5; 325 MG/1; MG/1
1 TABLET ORAL ONCE
Status: COMPLETED | OUTPATIENT
Start: 2025-08-30 | End: 2025-08-30

## 2025-08-29 RX ORDER — METHOCARBAMOL 500 MG/1
750 TABLET, FILM COATED ORAL ONCE
Status: COMPLETED | OUTPATIENT
Start: 2025-08-30 | End: 2025-08-30

## 2025-08-29 RX ORDER — LIDOCAINE 4 G/G
1 PATCH TOPICAL DAILY
Status: DISCONTINUED | OUTPATIENT
Start: 2025-08-30 | End: 2025-09-04 | Stop reason: HOSPADM

## 2025-08-29 ASSESSMENT — PAIN DESCRIPTION - ORIENTATION: ORIENTATION: MID

## 2025-08-29 ASSESSMENT — PAIN DESCRIPTION - LOCATION: LOCATION: BACK

## 2025-08-29 ASSESSMENT — PAIN SCALES - GENERAL: PAINLEVEL_OUTOF10: 10

## 2025-08-29 ASSESSMENT — PAIN - FUNCTIONAL ASSESSMENT: PAIN_FUNCTIONAL_ASSESSMENT: 0-10

## 2025-08-30 PROBLEM — Z99.2 ESRD ON HEMODIALYSIS (HCC): Status: ACTIVE | Noted: 2025-08-30

## 2025-08-30 PROBLEM — K59.03 DRUG-INDUCED CONSTIPATION: Status: ACTIVE | Noted: 2025-08-30

## 2025-08-30 PROBLEM — M54.59 INTRACTABLE LOW BACK PAIN: Status: ACTIVE | Noted: 2025-08-30

## 2025-08-30 PROBLEM — N18.6 ESRD ON HEMODIALYSIS (HCC): Status: ACTIVE | Noted: 2025-08-30

## 2025-08-30 PROBLEM — R82.71 ASYMPTOMATIC BACTERIURIA: Status: ACTIVE | Noted: 2025-08-30

## 2025-08-30 LAB
ANION GAP SERPL CALC-SCNC: 13 MEQ/L (ref 8–16)
ANION GAP SERPL CALC-SCNC: 16 MEQ/L (ref 8–16)
BACTERIA URNS QL MICRO: ABNORMAL /HPF
BASOPHILS ABSOLUTE: 0.1 THOU/MM3 (ref 0–0.1)
BASOPHILS ABSOLUTE: 0.1 THOU/MM3 (ref 0–0.1)
BASOPHILS NFR BLD AUTO: 0.6 %
BASOPHILS NFR BLD AUTO: 0.6 %
BILIRUB UR QL STRIP.AUTO: NEGATIVE
BUN SERPL-MCNC: 12 MG/DL (ref 8–23)
BUN SERPL-MCNC: 15 MG/DL (ref 8–23)
CALCIUM SERPL-MCNC: 8.6 MG/DL (ref 8.5–10.5)
CALCIUM SERPL-MCNC: 8.7 MG/DL (ref 8.5–10.5)
CASTS #/AREA URNS LPF: ABNORMAL /LPF
CASTS 2: ABNORMAL /LPF
CHARACTER UR: ABNORMAL
CHLORIDE SERPL-SCNC: 95 MEQ/L (ref 98–111)
CHLORIDE SERPL-SCNC: 95 MEQ/L (ref 98–111)
CO2 SERPL-SCNC: 25 MEQ/L (ref 22–29)
CO2 SERPL-SCNC: 27 MEQ/L (ref 22–29)
COLOR, UA: YELLOW
CREAT SERPL-MCNC: 2.4 MG/DL (ref 0.5–0.9)
CREAT SERPL-MCNC: 2.7 MG/DL (ref 0.5–0.9)
CRYSTALS URNS MICRO: ABNORMAL
DEPRECATED RDW RBC AUTO: 56.5 FL (ref 35–45)
DEPRECATED RDW RBC AUTO: 58.5 FL (ref 35–45)
EOSINOPHIL NFR BLD AUTO: 0.5 %
EOSINOPHIL NFR BLD AUTO: 0.8 %
EOSINOPHILS ABSOLUTE: 0.1 THOU/MM3 (ref 0–0.4)
EOSINOPHILS ABSOLUTE: 0.1 THOU/MM3 (ref 0–0.4)
EPITHELIAL CELLS, UA: ABNORMAL /HPF
ERYTHROCYTE [DISTWIDTH] IN BLOOD BY AUTOMATED COUNT: 15.2 % (ref 11.5–14.5)
ERYTHROCYTE [DISTWIDTH] IN BLOOD BY AUTOMATED COUNT: 15.5 % (ref 11.5–14.5)
GFR SERPL CREATININE-BSD FRML MDRD: 18 ML/MIN/1.73M2
GFR SERPL CREATININE-BSD FRML MDRD: 21 ML/MIN/1.73M2
GLUCOSE SERPL-MCNC: 88 MG/DL (ref 74–109)
GLUCOSE SERPL-MCNC: 94 MG/DL (ref 74–109)
GLUCOSE UR QL STRIP.AUTO: NEGATIVE MG/DL
HCT VFR BLD AUTO: 30.4 % (ref 37–47)
HCT VFR BLD AUTO: 32.2 % (ref 37–47)
HGB BLD-MCNC: 10 GM/DL (ref 12–16)
HGB BLD-MCNC: 9.6 GM/DL (ref 12–16)
HGB UR QL STRIP.AUTO: NEGATIVE
IMM GRANULOCYTES # BLD AUTO: 0.17 THOU/MM3 (ref 0–0.07)
IMM GRANULOCYTES # BLD AUTO: 0.25 THOU/MM3 (ref 0–0.07)
IMM GRANULOCYTES NFR BLD AUTO: 1.5 %
IMM GRANULOCYTES NFR BLD AUTO: 1.8 %
KETONES UR QL STRIP.AUTO: NEGATIVE
LYMPHOCYTES ABSOLUTE: 1.1 THOU/MM3 (ref 1–4.8)
LYMPHOCYTES ABSOLUTE: 1.2 THOU/MM3 (ref 1–4.8)
LYMPHOCYTES NFR BLD AUTO: 8.5 %
LYMPHOCYTES NFR BLD AUTO: 9.7 %
MAGNESIUM SERPL-MCNC: 2.1 MG/DL (ref 1.6–2.6)
MCH RBC QN AUTO: 31.7 PG (ref 26–33)
MCH RBC QN AUTO: 32.1 PG (ref 26–33)
MCHC RBC AUTO-ENTMCNC: 31.1 GM/DL (ref 32.2–35.5)
MCHC RBC AUTO-ENTMCNC: 31.6 GM/DL (ref 32.2–35.5)
MCV RBC AUTO: 100.3 FL (ref 81–99)
MCV RBC AUTO: 103.2 FL (ref 81–99)
MISCELLANEOUS 2: ABNORMAL
MONOCYTES ABSOLUTE: 0.5 THOU/MM3 (ref 0.4–1.3)
MONOCYTES ABSOLUTE: 1 THOU/MM3 (ref 0.4–1.3)
MONOCYTES NFR BLD AUTO: 4.7 %
MONOCYTES NFR BLD AUTO: 6.8 %
NEUTROPHILS ABSOLUTE: 11.4 THOU/MM3 (ref 1.8–7.7)
NEUTROPHILS ABSOLUTE: 9.1 THOU/MM3 (ref 1.8–7.7)
NEUTROPHILS NFR BLD AUTO: 81.5 %
NEUTROPHILS NFR BLD AUTO: 83 %
NITRITE UR QL STRIP: NEGATIVE
NRBC BLD AUTO-RTO: 0 /100 WBC
NRBC BLD AUTO-RTO: 0 /100 WBC
OSMOLALITY SERPL CALC.SUM OF ELEC: 270.7 MOSMOL/KG (ref 275–300)
OSMOLALITY SERPL CALC.SUM OF ELEC: 271.1 MOSMOL/KG (ref 275–300)
PH UR STRIP.AUTO: 7.5 [PH] (ref 5–9)
PLATELET # BLD AUTO: 320 THOU/MM3 (ref 130–400)
PLATELET # BLD AUTO: 356 THOU/MM3 (ref 130–400)
PMV BLD AUTO: 9.6 FL (ref 9.4–12.4)
PMV BLD AUTO: 9.6 FL (ref 9.4–12.4)
POTASSIUM SERPL-SCNC: 3.2 MEQ/L (ref 3.5–5.2)
POTASSIUM SERPL-SCNC: 4.5 MEQ/L (ref 3.5–5.2)
POTASSIUM SERPL-SCNC: 5.5 MEQ/L (ref 3.5–5.2)
PROT UR STRIP.AUTO-MCNC: 100 MG/DL
RBC # BLD AUTO: 3.03 MILL/MM3 (ref 4.2–5.4)
RBC # BLD AUTO: 3.12 MILL/MM3 (ref 4.2–5.4)
RBC URINE: ABNORMAL /HPF
RENAL EPI CELLS #/AREA URNS HPF: ABNORMAL /[HPF]
SODIUM SERPL-SCNC: 135 MEQ/L (ref 135–145)
SODIUM SERPL-SCNC: 136 MEQ/L (ref 135–145)
SP GR UR REFRACT.AUTO: 1.01 (ref 1–1.03)
TROPONIN, HIGH SENSITIVITY: 130 NG/L (ref 0–12)
TROPONIN, HIGH SENSITIVITY: 136 NG/L (ref 0–12)
UROBILINOGEN, URINE: 0.2 EU/DL (ref 0–1)
WBC # BLD AUTO: 11 THOU/MM3 (ref 4.8–10.8)
WBC # BLD AUTO: 14 THOU/MM3 (ref 4.8–10.8)
WBC #/AREA URNS HPF: ABNORMAL /HPF
WBC #/AREA URNS HPF: ABNORMAL /[HPF]
YEAST LIKE FUNGI URNS QL MICRO: ABNORMAL

## 2025-08-30 PROCEDURE — 81001 URINALYSIS AUTO W/SCOPE: CPT

## 2025-08-30 PROCEDURE — 87077 CULTURE AEROBIC IDENTIFY: CPT

## 2025-08-30 PROCEDURE — 96374 THER/PROPH/DIAG INJ IV PUSH: CPT

## 2025-08-30 PROCEDURE — 87086 URINE CULTURE/COLONY COUNT: CPT

## 2025-08-30 PROCEDURE — 6370000000 HC RX 637 (ALT 250 FOR IP): Performed by: STUDENT IN AN ORGANIZED HEALTH CARE EDUCATION/TRAINING PROGRAM

## 2025-08-30 PROCEDURE — 80048 BASIC METABOLIC PNL TOTAL CA: CPT

## 2025-08-30 PROCEDURE — 2500000003 HC RX 250 WO HCPCS

## 2025-08-30 PROCEDURE — 87186 SC STD MICRODIL/AGAR DIL: CPT

## 2025-08-30 PROCEDURE — 93005 ELECTROCARDIOGRAM TRACING: CPT | Performed by: STUDENT IN AN ORGANIZED HEALTH CARE EDUCATION/TRAINING PROGRAM

## 2025-08-30 PROCEDURE — 99223 1ST HOSP IP/OBS HIGH 75: CPT

## 2025-08-30 PROCEDURE — 96376 TX/PRO/DX INJ SAME DRUG ADON: CPT

## 2025-08-30 PROCEDURE — 6360000002 HC RX W HCPCS

## 2025-08-30 PROCEDURE — 84132 ASSAY OF SERUM POTASSIUM: CPT

## 2025-08-30 PROCEDURE — 6360000002 HC RX W HCPCS: Performed by: STUDENT IN AN ORGANIZED HEALTH CARE EDUCATION/TRAINING PROGRAM

## 2025-08-30 PROCEDURE — 99222 1ST HOSP IP/OBS MODERATE 55: CPT | Performed by: INTERNAL MEDICINE

## 2025-08-30 PROCEDURE — 6370000000 HC RX 637 (ALT 250 FOR IP)

## 2025-08-30 PROCEDURE — 96523 IRRIG DRUG DELIVERY DEVICE: CPT

## 2025-08-30 PROCEDURE — 84484 ASSAY OF TROPONIN QUANT: CPT

## 2025-08-30 PROCEDURE — 83735 ASSAY OF MAGNESIUM: CPT

## 2025-08-30 PROCEDURE — 36415 COLL VENOUS BLD VENIPUNCTURE: CPT

## 2025-08-30 PROCEDURE — 85025 COMPLETE CBC W/AUTO DIFF WBC: CPT

## 2025-08-30 PROCEDURE — 1200000003 HC TELEMETRY R&B

## 2025-08-30 RX ORDER — HYDRALAZINE HYDROCHLORIDE 25 MG/1
25 TABLET, FILM COATED ORAL EVERY 8 HOURS SCHEDULED
Status: DISCONTINUED | OUTPATIENT
Start: 2025-08-30 | End: 2025-09-04 | Stop reason: HOSPADM

## 2025-08-30 RX ORDER — BUPROPION HYDROCHLORIDE 300 MG/1
300 TABLET ORAL DAILY
Status: DISCONTINUED | OUTPATIENT
Start: 2025-08-30 | End: 2025-09-04 | Stop reason: HOSPADM

## 2025-08-30 RX ORDER — CARBIDOPA AND LEVODOPA 10; 100 MG/1; MG/1
1 TABLET ORAL 3 TIMES DAILY
Status: DISCONTINUED | OUTPATIENT
Start: 2025-08-30 | End: 2025-09-04 | Stop reason: HOSPADM

## 2025-08-30 RX ORDER — TIZANIDINE 2 MG/1
2 TABLET ORAL EVERY 8 HOURS PRN
COMMUNITY

## 2025-08-30 RX ORDER — PANTOPRAZOLE SODIUM 40 MG/1
40 TABLET, DELAYED RELEASE ORAL DAILY
COMMUNITY

## 2025-08-30 RX ORDER — ONDANSETRON 4 MG/1
4 TABLET, ORALLY DISINTEGRATING ORAL EVERY 8 HOURS PRN
Status: DISCONTINUED | OUTPATIENT
Start: 2025-08-30 | End: 2025-09-04 | Stop reason: HOSPADM

## 2025-08-30 RX ORDER — ACETAMINOPHEN 650 MG/1
650 SUPPOSITORY RECTAL EVERY 6 HOURS PRN
Status: DISCONTINUED | OUTPATIENT
Start: 2025-08-30 | End: 2025-09-04 | Stop reason: HOSPADM

## 2025-08-30 RX ORDER — CLONIDINE HYDROCHLORIDE 0.2 MG/1
0.2 TABLET ORAL 3 TIMES DAILY
Status: DISCONTINUED | OUTPATIENT
Start: 2025-08-30 | End: 2025-09-04 | Stop reason: HOSPADM

## 2025-08-30 RX ORDER — SERTRALINE HYDROCHLORIDE 100 MG/1
100 TABLET, FILM COATED ORAL DAILY
Status: DISCONTINUED | OUTPATIENT
Start: 2025-08-30 | End: 2025-09-04 | Stop reason: HOSPADM

## 2025-08-30 RX ORDER — ONDANSETRON 4 MG/1
2 TABLET, FILM COATED ORAL SEE ADMIN INSTRUCTIONS
Status: ON HOLD | COMMUNITY
End: 2025-09-04 | Stop reason: HOSPADM

## 2025-08-30 RX ORDER — PANTOPRAZOLE SODIUM 40 MG/1
40 TABLET, DELAYED RELEASE ORAL DAILY
Status: DISCONTINUED | OUTPATIENT
Start: 2025-08-30 | End: 2025-09-04 | Stop reason: HOSPADM

## 2025-08-30 RX ORDER — TRAZODONE HYDROCHLORIDE 50 MG/1
50 TABLET ORAL NIGHTLY
Status: DISCONTINUED | OUTPATIENT
Start: 2025-08-30 | End: 2025-09-04 | Stop reason: HOSPADM

## 2025-08-30 RX ORDER — POLYETHYLENE GLYCOL 3350 17 G/17G
17 POWDER, FOR SOLUTION ORAL DAILY
Status: DISCONTINUED | OUTPATIENT
Start: 2025-08-30 | End: 2025-09-04 | Stop reason: HOSPADM

## 2025-08-30 RX ORDER — ONDANSETRON 2 MG/ML
4 INJECTION INTRAMUSCULAR; INTRAVENOUS EVERY 6 HOURS PRN
Status: DISCONTINUED | OUTPATIENT
Start: 2025-08-30 | End: 2025-09-04 | Stop reason: HOSPADM

## 2025-08-30 RX ORDER — SODIUM CHLORIDE 9 MG/ML
INJECTION, SOLUTION INTRAVENOUS PRN
Status: DISCONTINUED | OUTPATIENT
Start: 2025-08-30 | End: 2025-09-04 | Stop reason: HOSPADM

## 2025-08-30 RX ORDER — CLOPIDOGREL BISULFATE 75 MG/1
75 TABLET ORAL DAILY
Status: DISCONTINUED | OUTPATIENT
Start: 2025-08-30 | End: 2025-09-04 | Stop reason: HOSPADM

## 2025-08-30 RX ORDER — LEVOTHYROXINE SODIUM 25 UG/1
25 TABLET ORAL DAILY
Status: DISCONTINUED | OUTPATIENT
Start: 2025-08-30 | End: 2025-09-04 | Stop reason: HOSPADM

## 2025-08-30 RX ORDER — ASPIRIN 81 MG/1
81 TABLET ORAL DAILY
Status: DISCONTINUED | OUTPATIENT
Start: 2025-08-30 | End: 2025-09-04 | Stop reason: HOSPADM

## 2025-08-30 RX ORDER — POLYETHYLENE GLYCOL 3350 17 G/17G
17 POWDER, FOR SOLUTION ORAL DAILY PRN
Status: DISCONTINUED | OUTPATIENT
Start: 2025-08-30 | End: 2025-09-04 | Stop reason: HOSPADM

## 2025-08-30 RX ORDER — SEVELAMER CARBONATE 800 MG/1
800 TABLET, FILM COATED ORAL
Status: DISCONTINUED | OUTPATIENT
Start: 2025-08-30 | End: 2025-09-04 | Stop reason: HOSPADM

## 2025-08-30 RX ORDER — DOCUSATE SODIUM 50 MG/5ML
100 LIQUID ORAL 3 TIMES DAILY
Status: DISCONTINUED | OUTPATIENT
Start: 2025-08-30 | End: 2025-09-04 | Stop reason: HOSPADM

## 2025-08-30 RX ORDER — ACETAMINOPHEN 325 MG/1
650 TABLET ORAL EVERY 6 HOURS PRN
Status: DISCONTINUED | OUTPATIENT
Start: 2025-08-30 | End: 2025-09-04 | Stop reason: HOSPADM

## 2025-08-30 RX ORDER — HEPARIN SODIUM 5000 [USP'U]/ML
5000 INJECTION, SOLUTION INTRAVENOUS; SUBCUTANEOUS EVERY 8 HOURS SCHEDULED
Status: DISCONTINUED | OUTPATIENT
Start: 2025-08-30 | End: 2025-09-04 | Stop reason: HOSPADM

## 2025-08-30 RX ORDER — ENOXAPARIN SODIUM 100 MG/ML
30 INJECTION SUBCUTANEOUS DAILY
Status: DISCONTINUED | OUTPATIENT
Start: 2025-08-30 | End: 2025-08-30

## 2025-08-30 RX ORDER — SENNOSIDES 8.6 MG/1
2 TABLET ORAL NIGHTLY
Status: DISCONTINUED | OUTPATIENT
Start: 2025-08-30 | End: 2025-09-04 | Stop reason: HOSPADM

## 2025-08-30 RX ORDER — CEPHALEXIN 500 MG/1
500 CAPSULE ORAL EVERY 6 HOURS SCHEDULED
Status: DISCONTINUED | OUTPATIENT
Start: 2025-08-30 | End: 2025-09-03

## 2025-08-30 RX ORDER — DEXAMETHASONE SODIUM PHOSPHATE 4 MG/ML
8 INJECTION, SOLUTION INTRA-ARTICULAR; INTRALESIONAL; INTRAMUSCULAR; INTRAVENOUS; SOFT TISSUE EVERY 8 HOURS
Status: COMPLETED | OUTPATIENT
Start: 2025-08-30 | End: 2025-08-30

## 2025-08-30 RX ORDER — SODIUM CHLORIDE 0.9 % (FLUSH) 0.9 %
5-40 SYRINGE (ML) INJECTION EVERY 12 HOURS SCHEDULED
Status: DISCONTINUED | OUTPATIENT
Start: 2025-08-30 | End: 2025-09-04 | Stop reason: HOSPADM

## 2025-08-30 RX ORDER — MIRTAZAPINE 15 MG/1
15 TABLET, FILM COATED ORAL NIGHTLY
Status: DISCONTINUED | OUTPATIENT
Start: 2025-08-30 | End: 2025-09-04 | Stop reason: HOSPADM

## 2025-08-30 RX ORDER — HYDROCODONE BITARTRATE AND ACETAMINOPHEN 5; 325 MG/1; MG/1
1 TABLET ORAL EVERY 4 HOURS PRN
Status: DISCONTINUED | OUTPATIENT
Start: 2025-08-30 | End: 2025-09-04 | Stop reason: HOSPADM

## 2025-08-30 RX ORDER — FLUTICASONE PROPIONATE 50 MCG
1 SPRAY, SUSPENSION (ML) NASAL DAILY PRN
Status: DISCONTINUED | OUTPATIENT
Start: 2025-08-30 | End: 2025-09-04 | Stop reason: HOSPADM

## 2025-08-30 RX ORDER — HYDROCODONE BITARTRATE AND ACETAMINOPHEN 5; 325 MG/1; MG/1
1 TABLET ORAL EVERY 4 HOURS PRN
COMMUNITY

## 2025-08-30 RX ORDER — AMLODIPINE BESYLATE 10 MG/1
10 TABLET ORAL DAILY
Status: DISCONTINUED | OUTPATIENT
Start: 2025-08-30 | End: 2025-09-04 | Stop reason: HOSPADM

## 2025-08-30 RX ORDER — BISACODYL 10 MG
10 SUPPOSITORY, RECTAL RECTAL DAILY PRN
Status: DISCONTINUED | OUTPATIENT
Start: 2025-08-30 | End: 2025-09-04 | Stop reason: HOSPADM

## 2025-08-30 RX ORDER — SODIUM CHLORIDE 0.9 % (FLUSH) 0.9 %
5-40 SYRINGE (ML) INJECTION PRN
Status: DISCONTINUED | OUTPATIENT
Start: 2025-08-30 | End: 2025-09-04 | Stop reason: HOSPADM

## 2025-08-30 RX ADMIN — CLONIDINE HYDROCHLORIDE 0.2 MG: 0.2 TABLET ORAL at 09:01

## 2025-08-30 RX ADMIN — HEPARIN SODIUM 5000 UNITS: 5000 INJECTION INTRAVENOUS; SUBCUTANEOUS at 13:28

## 2025-08-30 RX ADMIN — HYDROMORPHONE HYDROCHLORIDE 0.5 MG: 1 INJECTION, SOLUTION INTRAMUSCULAR; INTRAVENOUS; SUBCUTANEOUS at 02:05

## 2025-08-30 RX ADMIN — CARBIDOPA AND LEVODOPA 1 TABLET: 10; 100 TABLET ORAL at 09:02

## 2025-08-30 RX ADMIN — HEPARIN SODIUM 5000 UNITS: 5000 INJECTION INTRAVENOUS; SUBCUTANEOUS at 05:18

## 2025-08-30 RX ADMIN — HYDROMORPHONE HYDROCHLORIDE 0.25 MG: 1 INJECTION, SOLUTION INTRAMUSCULAR; INTRAVENOUS; SUBCUTANEOUS at 00:34

## 2025-08-30 RX ADMIN — CLONIDINE HYDROCHLORIDE 0.2 MG: 0.2 TABLET ORAL at 20:32

## 2025-08-30 RX ADMIN — SODIUM CHLORIDE, PRESERVATIVE FREE 10 ML: 5 INJECTION INTRAVENOUS at 14:15

## 2025-08-30 RX ADMIN — METHOCARBAMOL 750 MG: 500 TABLET ORAL at 00:37

## 2025-08-30 RX ADMIN — SODIUM CHLORIDE, PRESERVATIVE FREE 10 ML: 5 INJECTION INTRAVENOUS at 20:31

## 2025-08-30 RX ADMIN — HYDRALAZINE HYDROCHLORIDE 25 MG: 25 TABLET ORAL at 20:33

## 2025-08-30 RX ADMIN — HYDROMORPHONE HYDROCHLORIDE 0.5 MG: 1 INJECTION, SOLUTION INTRAMUSCULAR; INTRAVENOUS; SUBCUTANEOUS at 05:18

## 2025-08-30 RX ADMIN — DEXAMETHASONE SODIUM PHOSPHATE 8 MG: 4 INJECTION, SOLUTION INTRA-ARTICULAR; INTRALESIONAL; INTRAMUSCULAR; INTRAVENOUS; SOFT TISSUE at 14:15

## 2025-08-30 RX ADMIN — DEXAMETHASONE SODIUM PHOSPHATE 8 MG: 4 INJECTION, SOLUTION INTRA-ARTICULAR; INTRALESIONAL; INTRAMUSCULAR; INTRAVENOUS; SOFT TISSUE at 20:31

## 2025-08-30 RX ADMIN — MIRTAZAPINE 15 MG: 15 TABLET, FILM COATED ORAL at 20:32

## 2025-08-30 RX ADMIN — CLONIDINE HYDROCHLORIDE 0.2 MG: 0.2 TABLET ORAL at 13:26

## 2025-08-30 RX ADMIN — SEVELAMER CARBONATE 800 MG: 800 TABLET, FILM COATED ORAL at 09:01

## 2025-08-30 RX ADMIN — HYDROCODONE BITARTRATE AND ACETAMINOPHEN 1 TABLET: 5; 325 TABLET ORAL at 00:37

## 2025-08-30 RX ADMIN — SODIUM CHLORIDE, PRESERVATIVE FREE 10 ML: 5 INJECTION INTRAVENOUS at 09:01

## 2025-08-30 RX ADMIN — CEPHALEXIN 500 MG: 500 CAPSULE ORAL at 23:43

## 2025-08-30 RX ADMIN — HEPARIN SODIUM 5000 UNITS: 5000 INJECTION INTRAVENOUS; SUBCUTANEOUS at 20:31

## 2025-08-30 RX ADMIN — HYDRALAZINE HYDROCHLORIDE 25 MG: 25 TABLET ORAL at 13:26

## 2025-08-30 RX ADMIN — HYDROMORPHONE HYDROCHLORIDE 0.5 MG: 1 INJECTION, SOLUTION INTRAMUSCULAR; INTRAVENOUS; SUBCUTANEOUS at 08:44

## 2025-08-30 RX ADMIN — PANTOPRAZOLE SODIUM 40 MG: 40 TABLET, DELAYED RELEASE ORAL at 09:01

## 2025-08-30 RX ADMIN — CARBIDOPA AND LEVODOPA 1 TABLET: 10; 100 TABLET ORAL at 16:45

## 2025-08-30 RX ADMIN — HYDRALAZINE HYDROCHLORIDE 25 MG: 25 TABLET ORAL at 09:01

## 2025-08-30 RX ADMIN — BUPROPION HYDROCHLORIDE 300 MG: 300 TABLET, EXTENDED RELEASE ORAL at 09:01

## 2025-08-30 RX ADMIN — CEPHALEXIN 500 MG: 500 CAPSULE ORAL at 06:48

## 2025-08-30 RX ADMIN — TRAZODONE HYDROCHLORIDE 50 MG: 50 TABLET ORAL at 20:32

## 2025-08-30 RX ADMIN — AMLODIPINE BESYLATE 10 MG: 10 TABLET ORAL at 09:01

## 2025-08-30 RX ADMIN — DEXAMETHASONE SODIUM PHOSPHATE 8 MG: 4 INJECTION, SOLUTION INTRA-ARTICULAR; INTRALESIONAL; INTRAMUSCULAR; INTRAVENOUS; SOFT TISSUE at 06:41

## 2025-08-30 RX ADMIN — LEVOTHYROXINE SODIUM 25 MCG: 0.03 TABLET ORAL at 09:01

## 2025-08-30 RX ADMIN — SEVELAMER CARBONATE 800 MG: 800 TABLET, FILM COATED ORAL at 12:15

## 2025-08-30 RX ADMIN — SEVELAMER CARBONATE 800 MG: 800 TABLET, FILM COATED ORAL at 16:45

## 2025-08-30 RX ADMIN — SERTRALINE 100 MG: 100 TABLET, FILM COATED ORAL at 09:01

## 2025-08-30 RX ADMIN — POTASSIUM BICARBONATE 40 MEQ: 782 TABLET, EFFERVESCENT ORAL at 02:05

## 2025-08-30 RX ADMIN — CEPHALEXIN 500 MG: 500 CAPSULE ORAL at 12:15

## 2025-08-30 RX ADMIN — DOCUSATE SODIUM 100 MG: 50 LIQUID ORAL at 09:01

## 2025-08-30 RX ADMIN — CARBIDOPA AND LEVODOPA 1 TABLET: 10; 100 TABLET ORAL at 12:15

## 2025-08-30 RX ADMIN — ACETAMINOPHEN 650 MG: 325 TABLET ORAL at 00:37

## 2025-08-30 RX ADMIN — CEPHALEXIN 500 MG: 500 CAPSULE ORAL at 18:09

## 2025-08-30 RX ADMIN — POLYETHYLENE GLYCOL 3350 17 G: 17 POWDER, FOR SOLUTION ORAL at 09:01

## 2025-08-30 ASSESSMENT — PAIN SCALES - GENERAL
PAINLEVEL_OUTOF10: 10
PAINLEVEL_OUTOF10: 10
PAINLEVEL_OUTOF10: 8
PAINLEVEL_OUTOF10: 10
PAINLEVEL_OUTOF10: 7
PAINLEVEL_OUTOF10: 3
PAINLEVEL_OUTOF10: 7
PAINLEVEL_OUTOF10: 8
PAINLEVEL_OUTOF10: 0
PAINLEVEL_OUTOF10: 10

## 2025-08-30 ASSESSMENT — PAIN - FUNCTIONAL ASSESSMENT
PAIN_FUNCTIONAL_ASSESSMENT: PREVENTS OR INTERFERES SOME ACTIVE ACTIVITIES AND ADLS
PAIN_FUNCTIONAL_ASSESSMENT: 0-10
PAIN_FUNCTIONAL_ASSESSMENT: PREVENTS OR INTERFERES SOME ACTIVE ACTIVITIES AND ADLS
PAIN_FUNCTIONAL_ASSESSMENT: 0-10

## 2025-08-30 ASSESSMENT — PAIN DESCRIPTION - ORIENTATION
ORIENTATION: MID
ORIENTATION: LOWER
ORIENTATION: LOWER;MID

## 2025-08-30 ASSESSMENT — PAIN DESCRIPTION - LOCATION
LOCATION: BACK

## 2025-08-30 ASSESSMENT — PAIN DESCRIPTION - DESCRIPTORS
DESCRIPTORS: SORE;SHARP;DISCOMFORT
DESCRIPTORS: SHARP;SHOOTING
DESCRIPTORS: DISCOMFORT

## 2025-08-30 ASSESSMENT — HEART SCORE: ECG: NORMAL

## 2025-08-30 ASSESSMENT — PAIN DESCRIPTION - FREQUENCY: FREQUENCY: INTERMITTENT

## 2025-08-30 ASSESSMENT — PAIN DESCRIPTION - PAIN TYPE: TYPE: ACUTE PAIN

## 2025-08-31 LAB
ANION GAP SERPL CALC-SCNC: 14 MEQ/L (ref 8–16)
BASOPHILS ABSOLUTE: 0 THOU/MM3 (ref 0–0.1)
BASOPHILS NFR BLD AUTO: 0.1 %
BUN SERPL-MCNC: 28 MG/DL (ref 8–23)
CALCIUM SERPL-MCNC: 8.8 MG/DL (ref 8.5–10.5)
CHLORIDE SERPL-SCNC: 96 MEQ/L (ref 98–111)
CO2 SERPL-SCNC: 27 MEQ/L (ref 22–29)
CREAT SERPL-MCNC: 3.9 MG/DL (ref 0.5–0.9)
DEPRECATED RDW RBC AUTO: 56.5 FL (ref 35–45)
EKG ATRIAL RATE: 98 BPM
EKG P AXIS: 34 DEGREES
EKG P-R INTERVAL: 152 MS
EKG Q-T INTERVAL: 392 MS
EKG QRS DURATION: 114 MS
EKG QTC CALCULATION (BAZETT): 500 MS
EKG R AXIS: 41 DEGREES
EKG T AXIS: 12 DEGREES
EKG VENTRICULAR RATE: 98 BPM
EOSINOPHIL NFR BLD AUTO: 0.1 %
EOSINOPHILS ABSOLUTE: 0 THOU/MM3 (ref 0–0.4)
ERYTHROCYTE [DISTWIDTH] IN BLOOD BY AUTOMATED COUNT: 15.4 % (ref 11.5–14.5)
GFR SERPL CREATININE-BSD FRML MDRD: 12 ML/MIN/1.73M2
GLUCOSE SERPL-MCNC: 176 MG/DL (ref 74–109)
HCT VFR BLD AUTO: 31 % (ref 37–47)
HGB BLD-MCNC: 9.8 GM/DL (ref 12–16)
IMM GRANULOCYTES # BLD AUTO: 0.14 THOU/MM3 (ref 0–0.07)
IMM GRANULOCYTES NFR BLD AUTO: 1.7 %
LYMPHOCYTES ABSOLUTE: 0.9 THOU/MM3 (ref 1–4.8)
LYMPHOCYTES NFR BLD AUTO: 10.7 %
MAGNESIUM SERPL-MCNC: 2.2 MG/DL (ref 1.6–2.6)
MCH RBC QN AUTO: 31.8 PG (ref 26–33)
MCHC RBC AUTO-ENTMCNC: 31.6 GM/DL (ref 32.2–35.5)
MCV RBC AUTO: 100.6 FL (ref 81–99)
MONOCYTES ABSOLUTE: 0.4 THOU/MM3 (ref 0.4–1.3)
MONOCYTES NFR BLD AUTO: 4.8 %
NEUTROPHILS ABSOLUTE: 6.7 THOU/MM3 (ref 1.8–7.7)
NEUTROPHILS NFR BLD AUTO: 82.6 %
NRBC BLD AUTO-RTO: 0 /100 WBC
PLATELET # BLD AUTO: 367 THOU/MM3 (ref 130–400)
PMV BLD AUTO: 10 FL (ref 9.4–12.4)
POTASSIUM SERPL-SCNC: 4.7 MEQ/L (ref 3.5–5.2)
RBC # BLD AUTO: 3.08 MILL/MM3 (ref 4.2–5.4)
SODIUM SERPL-SCNC: 137 MEQ/L (ref 135–145)
WBC # BLD AUTO: 8.1 THOU/MM3 (ref 4.8–10.8)

## 2025-08-31 PROCEDURE — 6360000002 HC RX W HCPCS

## 2025-08-31 PROCEDURE — 2500000003 HC RX 250 WO HCPCS

## 2025-08-31 PROCEDURE — 6370000000 HC RX 637 (ALT 250 FOR IP): Performed by: STUDENT IN AN ORGANIZED HEALTH CARE EDUCATION/TRAINING PROGRAM

## 2025-08-31 PROCEDURE — 99232 SBSQ HOSP IP/OBS MODERATE 35: CPT | Performed by: NURSE PRACTITIONER

## 2025-08-31 PROCEDURE — 1200000003 HC TELEMETRY R&B

## 2025-08-31 PROCEDURE — 6370000000 HC RX 637 (ALT 250 FOR IP)

## 2025-08-31 PROCEDURE — 36415 COLL VENOUS BLD VENIPUNCTURE: CPT

## 2025-08-31 PROCEDURE — 85025 COMPLETE CBC W/AUTO DIFF WBC: CPT

## 2025-08-31 PROCEDURE — 93010 ELECTROCARDIOGRAM REPORT: CPT | Performed by: INTERNAL MEDICINE

## 2025-08-31 PROCEDURE — 99232 SBSQ HOSP IP/OBS MODERATE 35: CPT | Performed by: INTERNAL MEDICINE

## 2025-08-31 PROCEDURE — 80048 BASIC METABOLIC PNL TOTAL CA: CPT

## 2025-08-31 PROCEDURE — 83735 ASSAY OF MAGNESIUM: CPT

## 2025-08-31 RX ADMIN — HEPARIN SODIUM 5000 UNITS: 5000 INJECTION INTRAVENOUS; SUBCUTANEOUS at 06:23

## 2025-08-31 RX ADMIN — CARBIDOPA AND LEVODOPA 1 TABLET: 10; 100 TABLET ORAL at 06:22

## 2025-08-31 RX ADMIN — PANTOPRAZOLE SODIUM 40 MG: 40 TABLET, DELAYED RELEASE ORAL at 09:17

## 2025-08-31 RX ADMIN — HYDRALAZINE HYDROCHLORIDE 25 MG: 25 TABLET ORAL at 22:34

## 2025-08-31 RX ADMIN — CEPHALEXIN 500 MG: 500 CAPSULE ORAL at 22:35

## 2025-08-31 RX ADMIN — SERTRALINE 100 MG: 100 TABLET, FILM COATED ORAL at 09:16

## 2025-08-31 RX ADMIN — HEPARIN SODIUM 5000 UNITS: 5000 INJECTION INTRAVENOUS; SUBCUTANEOUS at 14:34

## 2025-08-31 RX ADMIN — TRAZODONE HYDROCHLORIDE 50 MG: 50 TABLET ORAL at 22:34

## 2025-08-31 RX ADMIN — MIRTAZAPINE 15 MG: 15 TABLET, FILM COATED ORAL at 22:34

## 2025-08-31 RX ADMIN — CLONIDINE HYDROCHLORIDE 0.2 MG: 0.2 TABLET ORAL at 14:35

## 2025-08-31 RX ADMIN — LEVOTHYROXINE SODIUM 25 MCG: 0.03 TABLET ORAL at 06:23

## 2025-08-31 RX ADMIN — CARBIDOPA AND LEVODOPA 1 TABLET: 10; 100 TABLET ORAL at 11:46

## 2025-08-31 RX ADMIN — SODIUM CHLORIDE, PRESERVATIVE FREE 10 ML: 5 INJECTION INTRAVENOUS at 09:17

## 2025-08-31 RX ADMIN — CARBIDOPA AND LEVODOPA 1 TABLET: 10; 100 TABLET ORAL at 17:01

## 2025-08-31 RX ADMIN — HYDRALAZINE HYDROCHLORIDE 25 MG: 25 TABLET ORAL at 14:34

## 2025-08-31 RX ADMIN — AMLODIPINE BESYLATE 10 MG: 10 TABLET ORAL at 09:17

## 2025-08-31 RX ADMIN — CLONIDINE HYDROCHLORIDE 0.2 MG: 0.2 TABLET ORAL at 22:34

## 2025-08-31 RX ADMIN — CEPHALEXIN 500 MG: 500 CAPSULE ORAL at 17:32

## 2025-08-31 RX ADMIN — ONDANSETRON 4 MG: 2 INJECTION, SOLUTION INTRAMUSCULAR; INTRAVENOUS at 15:15

## 2025-08-31 RX ADMIN — SEVELAMER CARBONATE 800 MG: 800 TABLET, FILM COATED ORAL at 17:02

## 2025-08-31 RX ADMIN — SEVELAMER CARBONATE 800 MG: 800 TABLET, FILM COATED ORAL at 11:46

## 2025-08-31 RX ADMIN — HEPARIN SODIUM 5000 UNITS: 5000 INJECTION INTRAVENOUS; SUBCUTANEOUS at 22:34

## 2025-08-31 RX ADMIN — SEVELAMER CARBONATE 800 MG: 800 TABLET, FILM COATED ORAL at 09:16

## 2025-08-31 RX ADMIN — TIZANIDINE 2 MG: 4 TABLET ORAL at 22:33

## 2025-08-31 RX ADMIN — CEPHALEXIN 500 MG: 500 CAPSULE ORAL at 06:22

## 2025-08-31 RX ADMIN — SODIUM CHLORIDE, PRESERVATIVE FREE 10 ML: 5 INJECTION INTRAVENOUS at 22:35

## 2025-08-31 RX ADMIN — CEPHALEXIN 500 MG: 500 CAPSULE ORAL at 11:46

## 2025-08-31 RX ADMIN — CLONIDINE HYDROCHLORIDE 0.2 MG: 0.2 TABLET ORAL at 09:17

## 2025-08-31 RX ADMIN — DOCUSATE SODIUM 100 MG: 50 LIQUID ORAL at 14:34

## 2025-08-31 RX ADMIN — SENNOSIDES 17.2 MG: 8.6 TABLET, FILM COATED ORAL at 22:34

## 2025-08-31 RX ADMIN — HYDRALAZINE HYDROCHLORIDE 25 MG: 25 TABLET ORAL at 06:23

## 2025-08-31 RX ADMIN — BUPROPION HYDROCHLORIDE 300 MG: 300 TABLET, EXTENDED RELEASE ORAL at 09:17

## 2025-08-31 ASSESSMENT — PAIN SCALES - GENERAL
PAINLEVEL_OUTOF10: 0
PAINLEVEL_OUTOF10: 2
PAINLEVEL_OUTOF10: 0
PAINLEVEL_OUTOF10: 0
PAINLEVEL_OUTOF10: 1
PAINLEVEL_OUTOF10: 0

## 2025-08-31 ASSESSMENT — PAIN - FUNCTIONAL ASSESSMENT
PAIN_FUNCTIONAL_ASSESSMENT: 0-10
PAIN_FUNCTIONAL_ASSESSMENT: 0-10

## 2025-08-31 ASSESSMENT — PAIN DESCRIPTION - LOCATION: LOCATION: BACK

## 2025-08-31 ASSESSMENT — PAIN DESCRIPTION - ORIENTATION: ORIENTATION: LOWER

## 2025-08-31 ASSESSMENT — PAIN SCALES - WONG BAKER
WONGBAKER_NUMERICALRESPONSE: NO HURT
WONGBAKER_NUMERICALRESPONSE: NO HURT

## 2025-08-31 ASSESSMENT — PAIN DESCRIPTION - DESCRIPTORS: DESCRIPTORS: ACHING

## 2025-09-01 LAB
ANION GAP SERPL CALC-SCNC: 15 MEQ/L (ref 8–16)
BACTERIA UR CULT: ABNORMAL
BASOPHILS ABSOLUTE: 0 THOU/MM3 (ref 0–0.1)
BASOPHILS NFR BLD AUTO: 0.4 %
BUN SERPL-MCNC: 42 MG/DL (ref 8–23)
CALCIUM SERPL-MCNC: 9.1 MG/DL (ref 8.5–10.5)
CHLORIDE SERPL-SCNC: 99 MEQ/L (ref 98–111)
CO2 SERPL-SCNC: 25 MEQ/L (ref 22–29)
CREAT SERPL-MCNC: 5.4 MG/DL (ref 0.5–0.9)
DEPRECATED RDW RBC AUTO: 59.5 FL (ref 35–45)
EOSINOPHIL NFR BLD AUTO: 0.4 %
EOSINOPHILS ABSOLUTE: 0 THOU/MM3 (ref 0–0.4)
ERYTHROCYTE [DISTWIDTH] IN BLOOD BY AUTOMATED COUNT: 15.7 % (ref 11.5–14.5)
GFR SERPL CREATININE-BSD FRML MDRD: 8 ML/MIN/1.73M2
GLUCOSE SERPL-MCNC: 99 MG/DL (ref 74–109)
HCT VFR BLD AUTO: 31.6 % (ref 37–47)
HGB BLD-MCNC: 9.6 GM/DL (ref 12–16)
IMM GRANULOCYTES # BLD AUTO: 0.2 THOU/MM3 (ref 0–0.07)
IMM GRANULOCYTES NFR BLD AUTO: 1.9 %
LYMPHOCYTES ABSOLUTE: 1.9 THOU/MM3 (ref 1–4.8)
LYMPHOCYTES NFR BLD AUTO: 18.9 %
MCH RBC QN AUTO: 31.6 PG (ref 26–33)
MCHC RBC AUTO-ENTMCNC: 30.4 GM/DL (ref 32.2–35.5)
MCV RBC AUTO: 103.9 FL (ref 81–99)
MONOCYTES ABSOLUTE: 0.8 THOU/MM3 (ref 0.4–1.3)
MONOCYTES NFR BLD AUTO: 7.8 %
NEUTROPHILS ABSOLUTE: 7.3 THOU/MM3 (ref 1.8–7.7)
NEUTROPHILS NFR BLD AUTO: 70.6 %
NRBC BLD AUTO-RTO: 0 /100 WBC
ORGANISM: ABNORMAL
PLATELET # BLD AUTO: 333 THOU/MM3 (ref 130–400)
PMV BLD AUTO: 10 FL (ref 9.4–12.4)
POTASSIUM SERPL-SCNC: 4.8 MEQ/L (ref 3.5–5.2)
RBC # BLD AUTO: 3.04 MILL/MM3 (ref 4.2–5.4)
SODIUM SERPL-SCNC: 139 MEQ/L (ref 135–145)
WBC # BLD AUTO: 10.3 THOU/MM3 (ref 4.8–10.8)

## 2025-09-01 PROCEDURE — 6370000000 HC RX 637 (ALT 250 FOR IP): Performed by: STUDENT IN AN ORGANIZED HEALTH CARE EDUCATION/TRAINING PROGRAM

## 2025-09-01 PROCEDURE — 6370000000 HC RX 637 (ALT 250 FOR IP)

## 2025-09-01 PROCEDURE — 99232 SBSQ HOSP IP/OBS MODERATE 35: CPT | Performed by: NURSE PRACTITIONER

## 2025-09-01 PROCEDURE — 2500000003 HC RX 250 WO HCPCS

## 2025-09-01 PROCEDURE — 90935 HEMODIALYSIS ONE EVALUATION: CPT

## 2025-09-01 PROCEDURE — 6360000002 HC RX W HCPCS

## 2025-09-01 PROCEDURE — 85025 COMPLETE CBC W/AUTO DIFF WBC: CPT

## 2025-09-01 PROCEDURE — 5A1D70Z PERFORMANCE OF URINARY FILTRATION, INTERMITTENT, LESS THAN 6 HOURS PER DAY: ICD-10-PCS | Performed by: PHYSICIAN ASSISTANT

## 2025-09-01 PROCEDURE — 99232 SBSQ HOSP IP/OBS MODERATE 35: CPT | Performed by: INTERNAL MEDICINE

## 2025-09-01 PROCEDURE — 1200000003 HC TELEMETRY R&B

## 2025-09-01 PROCEDURE — 80048 BASIC METABOLIC PNL TOTAL CA: CPT

## 2025-09-01 PROCEDURE — 36415 COLL VENOUS BLD VENIPUNCTURE: CPT

## 2025-09-01 RX ORDER — BENZONATATE 100 MG/1
100 CAPSULE ORAL 3 TIMES DAILY PRN
Status: DISCONTINUED | OUTPATIENT
Start: 2025-09-01 | End: 2025-09-04 | Stop reason: HOSPADM

## 2025-09-01 RX ADMIN — ACETAMINOPHEN 650 MG: 325 TABLET ORAL at 06:09

## 2025-09-01 RX ADMIN — CARBIDOPA AND LEVODOPA 1 TABLET: 10; 100 TABLET ORAL at 17:18

## 2025-09-01 RX ADMIN — CARBIDOPA AND LEVODOPA 1 TABLET: 10; 100 TABLET ORAL at 14:50

## 2025-09-01 RX ADMIN — CLONIDINE HYDROCHLORIDE 0.2 MG: 0.2 TABLET ORAL at 08:29

## 2025-09-01 RX ADMIN — CEPHALEXIN 500 MG: 500 CAPSULE ORAL at 17:18

## 2025-09-01 RX ADMIN — SERTRALINE 100 MG: 100 TABLET, FILM COATED ORAL at 08:29

## 2025-09-01 RX ADMIN — BUPROPION HYDROCHLORIDE 300 MG: 300 TABLET, EXTENDED RELEASE ORAL at 08:29

## 2025-09-01 RX ADMIN — SODIUM CHLORIDE, PRESERVATIVE FREE 10 ML: 5 INJECTION INTRAVENOUS at 08:25

## 2025-09-01 RX ADMIN — CEPHALEXIN 500 MG: 500 CAPSULE ORAL at 05:43

## 2025-09-01 RX ADMIN — TRAZODONE HYDROCHLORIDE 50 MG: 50 TABLET ORAL at 21:03

## 2025-09-01 RX ADMIN — CLONIDINE HYDROCHLORIDE 0.2 MG: 0.2 TABLET ORAL at 21:03

## 2025-09-01 RX ADMIN — BENZOCAINE 6 MG-MENTHOL 10 MG LOZENGES 1 LOZENGE: at 22:45

## 2025-09-01 RX ADMIN — MIRTAZAPINE 15 MG: 15 TABLET, FILM COATED ORAL at 21:03

## 2025-09-01 RX ADMIN — HYDRALAZINE HYDROCHLORIDE 25 MG: 25 TABLET ORAL at 05:43

## 2025-09-01 RX ADMIN — HEPARIN SODIUM 5000 UNITS: 5000 INJECTION INTRAVENOUS; SUBCUTANEOUS at 14:30

## 2025-09-01 RX ADMIN — CLONIDINE HYDROCHLORIDE 0.2 MG: 0.2 TABLET ORAL at 14:29

## 2025-09-01 RX ADMIN — SENNOSIDES 17.2 MG: 8.6 TABLET, FILM COATED ORAL at 21:03

## 2025-09-01 RX ADMIN — CEPHALEXIN 500 MG: 500 CAPSULE ORAL at 23:47

## 2025-09-01 RX ADMIN — SEVELAMER CARBONATE 800 MG: 800 TABLET, FILM COATED ORAL at 14:50

## 2025-09-01 RX ADMIN — DOCUSATE SODIUM 100 MG: 50 LIQUID ORAL at 14:29

## 2025-09-01 RX ADMIN — HEPARIN SODIUM 5000 UNITS: 5000 INJECTION INTRAVENOUS; SUBCUTANEOUS at 05:43

## 2025-09-01 RX ADMIN — SEVELAMER CARBONATE 800 MG: 800 TABLET, FILM COATED ORAL at 05:42

## 2025-09-01 RX ADMIN — SODIUM CHLORIDE, PRESERVATIVE FREE 10 ML: 5 INJECTION INTRAVENOUS at 21:04

## 2025-09-01 RX ADMIN — CEPHALEXIN 500 MG: 500 CAPSULE ORAL at 14:50

## 2025-09-01 RX ADMIN — SEVELAMER CARBONATE 800 MG: 800 TABLET, FILM COATED ORAL at 17:18

## 2025-09-01 RX ADMIN — AMLODIPINE BESYLATE 10 MG: 10 TABLET ORAL at 08:29

## 2025-09-01 RX ADMIN — HEPARIN SODIUM 5000 UNITS: 5000 INJECTION INTRAVENOUS; SUBCUTANEOUS at 21:03

## 2025-09-01 RX ADMIN — CARBIDOPA AND LEVODOPA 1 TABLET: 10; 100 TABLET ORAL at 05:44

## 2025-09-01 RX ADMIN — PANTOPRAZOLE SODIUM 40 MG: 40 TABLET, DELAYED RELEASE ORAL at 08:29

## 2025-09-01 RX ADMIN — HYDRALAZINE HYDROCHLORIDE 25 MG: 25 TABLET ORAL at 14:29

## 2025-09-01 RX ADMIN — ACETAMINOPHEN 650 MG: 325 TABLET ORAL at 21:04

## 2025-09-01 RX ADMIN — LEVOTHYROXINE SODIUM 25 MCG: 0.03 TABLET ORAL at 05:42

## 2025-09-01 RX ADMIN — DOCUSATE SODIUM 100 MG: 50 LIQUID ORAL at 08:29

## 2025-09-01 ASSESSMENT — PAIN DESCRIPTION - LOCATION
LOCATION: BACK
LOCATION: BACK

## 2025-09-01 ASSESSMENT — PAIN SCALES - WONG BAKER
WONGBAKER_NUMERICALRESPONSE: NO HURT

## 2025-09-01 ASSESSMENT — PAIN DESCRIPTION - DESCRIPTORS
DESCRIPTORS: ACHING;BURNING
DESCRIPTORS: ACHING;DISCOMFORT

## 2025-09-01 ASSESSMENT — PAIN SCALES - GENERAL
PAINLEVEL_OUTOF10: 0
PAINLEVEL_OUTOF10: 0
PAINLEVEL_OUTOF10: 3
PAINLEVEL_OUTOF10: 3
PAINLEVEL_OUTOF10: 0

## 2025-09-01 ASSESSMENT — PAIN - FUNCTIONAL ASSESSMENT
PAIN_FUNCTIONAL_ASSESSMENT: ACTIVITIES ARE NOT PREVENTED
PAIN_FUNCTIONAL_ASSESSMENT: 0-10
PAIN_FUNCTIONAL_ASSESSMENT: 0-10

## 2025-09-01 ASSESSMENT — PAIN DESCRIPTION - ORIENTATION
ORIENTATION: LOWER
ORIENTATION: LOWER

## 2025-09-02 ENCOUNTER — HOSPITAL ENCOUNTER (INPATIENT)
Dept: INTERVENTIONAL RADIOLOGY/VASCULAR | Age: 70
Discharge: HOME | DRG: 515 | End: 2025-09-02
Payer: MEDICARE

## 2025-09-02 LAB
ANION GAP SERPL CALC-SCNC: 12 MEQ/L (ref 8–16)
BUN SERPL-MCNC: 21 MG/DL (ref 8–23)
CALCIUM SERPL-MCNC: 9 MG/DL (ref 8.5–10.5)
CHLORIDE SERPL-SCNC: 101 MEQ/L (ref 98–111)
CO2 SERPL-SCNC: 27 MEQ/L (ref 22–29)
CREAT SERPL-MCNC: 3.4 MG/DL (ref 0.5–0.9)
GFR SERPL CREATININE-BSD FRML MDRD: 14 ML/MIN/1.73M2
GLUCOSE SERPL-MCNC: 72 MG/DL (ref 74–109)
POTASSIUM SERPL-SCNC: 4.3 MEQ/L (ref 3.5–5.2)
SODIUM SERPL-SCNC: 140 MEQ/L (ref 135–145)

## 2025-09-02 PROCEDURE — 36415 COLL VENOUS BLD VENIPUNCTURE: CPT

## 2025-09-02 PROCEDURE — 6370000000 HC RX 637 (ALT 250 FOR IP): Performed by: STUDENT IN AN ORGANIZED HEALTH CARE EDUCATION/TRAINING PROGRAM

## 2025-09-02 PROCEDURE — 1200000003 HC TELEMETRY R&B

## 2025-09-02 PROCEDURE — 76000 FLUOROSCOPY <1 HR PHYS/QHP: CPT

## 2025-09-02 PROCEDURE — 6370000000 HC RX 637 (ALT 250 FOR IP)

## 2025-09-02 PROCEDURE — 97530 THERAPEUTIC ACTIVITIES: CPT

## 2025-09-02 PROCEDURE — 99232 SBSQ HOSP IP/OBS MODERATE 35: CPT | Performed by: INTERNAL MEDICINE

## 2025-09-02 PROCEDURE — 97162 PT EVAL MOD COMPLEX 30 MIN: CPT

## 2025-09-02 PROCEDURE — 80048 BASIC METABOLIC PNL TOTAL CA: CPT

## 2025-09-02 PROCEDURE — 6360000002 HC RX W HCPCS

## 2025-09-02 PROCEDURE — 2500000003 HC RX 250 WO HCPCS

## 2025-09-02 PROCEDURE — 99232 SBSQ HOSP IP/OBS MODERATE 35: CPT | Performed by: NURSE PRACTITIONER

## 2025-09-02 RX ORDER — SODIUM CHLORIDE 450 MG/100ML
INJECTION, SOLUTION INTRAVENOUS CONTINUOUS
Status: CANCELLED | OUTPATIENT
Start: 2025-09-02

## 2025-09-02 RX ORDER — MIDAZOLAM HYDROCHLORIDE 1 MG/ML
1 INJECTION, SOLUTION INTRAMUSCULAR; INTRAVENOUS ONCE
Status: CANCELLED | OUTPATIENT
Start: 2025-09-02 | End: 2025-09-02

## 2025-09-02 RX ORDER — FENTANYL CITRATE 50 UG/ML
50 INJECTION, SOLUTION INTRAMUSCULAR; INTRAVENOUS ONCE
Refills: 0 | Status: CANCELLED | OUTPATIENT
Start: 2025-09-02 | End: 2025-09-02

## 2025-09-02 RX ADMIN — CEPHALEXIN 500 MG: 500 CAPSULE ORAL at 15:03

## 2025-09-02 RX ADMIN — SODIUM CHLORIDE, PRESERVATIVE FREE 10 ML: 5 INJECTION INTRAVENOUS at 20:49

## 2025-09-02 RX ADMIN — LEVOTHYROXINE SODIUM 25 MCG: 0.03 TABLET ORAL at 06:16

## 2025-09-02 RX ADMIN — HYDRALAZINE HYDROCHLORIDE 25 MG: 25 TABLET ORAL at 20:49

## 2025-09-02 RX ADMIN — TRAZODONE HYDROCHLORIDE 50 MG: 50 TABLET ORAL at 20:49

## 2025-09-02 RX ADMIN — HYDRALAZINE HYDROCHLORIDE 25 MG: 25 TABLET ORAL at 06:16

## 2025-09-02 RX ADMIN — CARBIDOPA AND LEVODOPA 1 TABLET: 10; 100 TABLET ORAL at 15:03

## 2025-09-02 RX ADMIN — SODIUM CHLORIDE, PRESERVATIVE FREE 10 ML: 5 INJECTION INTRAVENOUS at 09:52

## 2025-09-02 RX ADMIN — PANTOPRAZOLE SODIUM 40 MG: 40 TABLET, DELAYED RELEASE ORAL at 09:50

## 2025-09-02 RX ADMIN — CEPHALEXIN 500 MG: 500 CAPSULE ORAL at 06:16

## 2025-09-02 RX ADMIN — BUPROPION HYDROCHLORIDE 300 MG: 300 TABLET, EXTENDED RELEASE ORAL at 09:50

## 2025-09-02 RX ADMIN — CLONIDINE HYDROCHLORIDE 0.2 MG: 0.2 TABLET ORAL at 20:49

## 2025-09-02 RX ADMIN — SERTRALINE 100 MG: 100 TABLET, FILM COATED ORAL at 09:51

## 2025-09-02 RX ADMIN — SEVELAMER CARBONATE 800 MG: 800 TABLET, FILM COATED ORAL at 17:47

## 2025-09-02 RX ADMIN — HEPARIN SODIUM 5000 UNITS: 5000 INJECTION INTRAVENOUS; SUBCUTANEOUS at 06:16

## 2025-09-02 RX ADMIN — SENNOSIDES 17.2 MG: 8.6 TABLET, FILM COATED ORAL at 20:49

## 2025-09-02 RX ADMIN — CARBIDOPA AND LEVODOPA 1 TABLET: 10; 100 TABLET ORAL at 17:47

## 2025-09-02 RX ADMIN — CLONIDINE HYDROCHLORIDE 0.2 MG: 0.2 TABLET ORAL at 15:03

## 2025-09-02 RX ADMIN — MIRTAZAPINE 15 MG: 15 TABLET, FILM COATED ORAL at 20:49

## 2025-09-02 RX ADMIN — HYDRALAZINE HYDROCHLORIDE 25 MG: 25 TABLET ORAL at 15:03

## 2025-09-02 RX ADMIN — HYDROCODONE BITARTRATE AND ACETAMINOPHEN 1 TABLET: 5; 325 TABLET ORAL at 11:20

## 2025-09-02 RX ADMIN — HEPARIN SODIUM 5000 UNITS: 5000 INJECTION INTRAVENOUS; SUBCUTANEOUS at 15:04

## 2025-09-02 RX ADMIN — SEVELAMER CARBONATE 800 MG: 800 TABLET, FILM COATED ORAL at 15:03

## 2025-09-02 RX ADMIN — SEVELAMER CARBONATE 800 MG: 800 TABLET, FILM COATED ORAL at 11:20

## 2025-09-02 RX ADMIN — POLYETHYLENE GLYCOL 3350 17 G: 17 POWDER, FOR SOLUTION ORAL at 09:47

## 2025-09-02 RX ADMIN — CARBIDOPA AND LEVODOPA 1 TABLET: 10; 100 TABLET ORAL at 06:16

## 2025-09-02 RX ADMIN — CEPHALEXIN 500 MG: 500 CAPSULE ORAL at 17:47

## 2025-09-02 RX ADMIN — DOCUSATE SODIUM 100 MG: 50 LIQUID ORAL at 09:47

## 2025-09-02 ASSESSMENT — PAIN SCALES - GENERAL
PAINLEVEL_OUTOF10: 4
PAINLEVEL_OUTOF10: 10
PAINLEVEL_OUTOF10: 0
PAINLEVEL_OUTOF10: 4
PAINLEVEL_OUTOF10: 8

## 2025-09-02 ASSESSMENT — PAIN - FUNCTIONAL ASSESSMENT
PAIN_FUNCTIONAL_ASSESSMENT: 0-10
PAIN_FUNCTIONAL_ASSESSMENT: ACTIVITIES ARE NOT PREVENTED
PAIN_FUNCTIONAL_ASSESSMENT: 0-10

## 2025-09-02 ASSESSMENT — PAIN DESCRIPTION - ORIENTATION
ORIENTATION: RIGHT
ORIENTATION: MID

## 2025-09-02 ASSESSMENT — PAIN DESCRIPTION - DESCRIPTORS
DESCRIPTORS: ACHING
DESCRIPTORS: BURNING

## 2025-09-02 ASSESSMENT — PAIN DESCRIPTION - ONSET: ONSET: GRADUAL

## 2025-09-02 ASSESSMENT — PAIN DESCRIPTION - FREQUENCY: FREQUENCY: INTERMITTENT

## 2025-09-02 ASSESSMENT — PAIN DESCRIPTION - PAIN TYPE: TYPE: SURGICAL PAIN

## 2025-09-02 ASSESSMENT — PAIN DESCRIPTION - LOCATION
LOCATION: BACK
LOCATION: LEG

## 2025-09-03 ENCOUNTER — APPOINTMENT (OUTPATIENT)
Dept: INTERVENTIONAL RADIOLOGY/VASCULAR | Age: 70
DRG: 515 | End: 2025-09-03
Payer: MEDICARE

## 2025-09-03 PROBLEM — S32.000G COMPRESSION FRACTURE OF LUMBAR VERTEBRA WITH DELAYED HEALING: Status: ACTIVE | Noted: 2023-12-21

## 2025-09-03 LAB
ANION GAP SERPL CALC-SCNC: 15 MEQ/L (ref 8–16)
BUN SERPL-MCNC: 34 MG/DL (ref 8–23)
CALCIUM SERPL-MCNC: 9.2 MG/DL (ref 8.5–10.5)
CHLORIDE SERPL-SCNC: 100 MEQ/L (ref 98–111)
CO2 SERPL-SCNC: 22 MEQ/L (ref 22–29)
CREAT SERPL-MCNC: 4.7 MG/DL (ref 0.5–0.9)
DEPRECATED RDW RBC AUTO: 56.3 FL (ref 35–45)
ERYTHROCYTE [DISTWIDTH] IN BLOOD BY AUTOMATED COUNT: 14.9 % (ref 11.5–14.5)
GFR SERPL CREATININE-BSD FRML MDRD: 9 ML/MIN/1.73M2
GLUCOSE BLD STRIP.AUTO-MCNC: 85 MG/DL (ref 70–108)
GLUCOSE SERPL-MCNC: 72 MG/DL (ref 74–109)
HCT VFR BLD AUTO: 32.4 % (ref 37–47)
HGB BLD-MCNC: 10.1 GM/DL (ref 12–16)
INR PPP: 0.91 (ref 0.85–1.13)
MCH RBC QN AUTO: 31.6 PG (ref 26–33)
MCHC RBC AUTO-ENTMCNC: 31.2 GM/DL (ref 32.2–35.5)
MCV RBC AUTO: 101.3 FL (ref 81–99)
PLATELET # BLD AUTO: 338 THOU/MM3 (ref 130–400)
PMV BLD AUTO: 9.9 FL (ref 9.4–12.4)
POTASSIUM SERPL-SCNC: 4.8 MEQ/L (ref 3.5–5.2)
PROTHROMBIN TIME: 10.7 SECONDS (ref 10–13.5)
RBC # BLD AUTO: 3.2 MILL/MM3 (ref 4.2–5.4)
SODIUM SERPL-SCNC: 137 MEQ/L (ref 135–145)
WBC # BLD AUTO: 8.3 THOU/MM3 (ref 4.8–10.8)

## 2025-09-03 PROCEDURE — 85027 COMPLETE CBC AUTOMATED: CPT

## 2025-09-03 PROCEDURE — 90935 HEMODIALYSIS ONE EVALUATION: CPT | Performed by: INTERNAL MEDICINE

## 2025-09-03 PROCEDURE — 85610 PROTHROMBIN TIME: CPT

## 2025-09-03 PROCEDURE — 2500000003 HC RX 250 WO HCPCS: Performed by: RADIOLOGY

## 2025-09-03 PROCEDURE — 99232 SBSQ HOSP IP/OBS MODERATE 35: CPT | Performed by: PHYSICIAN ASSISTANT

## 2025-09-03 PROCEDURE — 6360000002 HC RX W HCPCS: Performed by: RADIOLOGY

## 2025-09-03 PROCEDURE — 0QU03JZ SUPPLEMENT LUMBAR VERTEBRA WITH SYNTHETIC SUBSTITUTE, PERCUTANEOUS APPROACH: ICD-10-PCS | Performed by: RADIOLOGY

## 2025-09-03 PROCEDURE — 6370000000 HC RX 637 (ALT 250 FOR IP): Performed by: PHYSICIAN ASSISTANT

## 2025-09-03 PROCEDURE — 80048 BASIC METABOLIC PNL TOTAL CA: CPT

## 2025-09-03 PROCEDURE — 2500000003 HC RX 250 WO HCPCS

## 2025-09-03 PROCEDURE — 82948 REAGENT STRIP/BLOOD GLUCOSE: CPT

## 2025-09-03 PROCEDURE — 90935 HEMODIALYSIS ONE EVALUATION: CPT

## 2025-09-03 PROCEDURE — 36415 COLL VENOUS BLD VENIPUNCTURE: CPT

## 2025-09-03 PROCEDURE — 6370000000 HC RX 637 (ALT 250 FOR IP)

## 2025-09-03 PROCEDURE — 22511 PERQ LUMBOSACRAL INJECTION: CPT

## 2025-09-03 PROCEDURE — 6370000000 HC RX 637 (ALT 250 FOR IP): Performed by: STUDENT IN AN ORGANIZED HEALTH CARE EDUCATION/TRAINING PROGRAM

## 2025-09-03 PROCEDURE — C1713 ANCHOR/SCREW BN/BN,TIS/BN: HCPCS

## 2025-09-03 PROCEDURE — 2580000003 HC RX 258: Performed by: RADIOLOGY

## 2025-09-03 PROCEDURE — 1200000003 HC TELEMETRY R&B

## 2025-09-03 PROCEDURE — 22512 VERTEBROPLASTY ADDL INJECT: CPT

## 2025-09-03 RX ORDER — MIDAZOLAM HYDROCHLORIDE 2 MG/2ML
1 INJECTION, SOLUTION INTRAMUSCULAR; INTRAVENOUS ONCE
Status: DISCONTINUED | OUTPATIENT
Start: 2025-09-03 | End: 2025-09-04 | Stop reason: HOSPADM

## 2025-09-03 RX ORDER — SODIUM CHLORIDE 450 MG/100ML
INJECTION, SOLUTION INTRAVENOUS CONTINUOUS
Status: DISCONTINUED | OUTPATIENT
Start: 2025-09-03 | End: 2025-09-04

## 2025-09-03 RX ORDER — CEPHALEXIN 500 MG/1
500 CAPSULE ORAL EVERY 12 HOURS
Status: DISCONTINUED | OUTPATIENT
Start: 2025-09-03 | End: 2025-09-04 | Stop reason: HOSPADM

## 2025-09-03 RX ORDER — FENTANYL CITRATE 50 UG/ML
50 INJECTION, SOLUTION INTRAMUSCULAR; INTRAVENOUS ONCE
Status: DISCONTINUED | OUTPATIENT
Start: 2025-09-03 | End: 2025-09-04 | Stop reason: HOSPADM

## 2025-09-03 RX ORDER — MIDAZOLAM HYDROCHLORIDE 1 MG/ML
INJECTION, SOLUTION INTRAMUSCULAR; INTRAVENOUS PRN
Status: COMPLETED | OUTPATIENT
Start: 2025-09-03 | End: 2025-09-03

## 2025-09-03 RX ADMIN — HYDRALAZINE HYDROCHLORIDE 25 MG: 25 TABLET ORAL at 15:15

## 2025-09-03 RX ADMIN — SENNOSIDES 17.2 MG: 8.6 TABLET, FILM COATED ORAL at 20:49

## 2025-09-03 RX ADMIN — MIDAZOLAM 0.5 MG: 1 INJECTION INTRAMUSCULAR; INTRAVENOUS at 14:26

## 2025-09-03 RX ADMIN — MIDAZOLAM 1 MG: 1 INJECTION INTRAMUSCULAR; INTRAVENOUS at 14:16

## 2025-09-03 RX ADMIN — CLONIDINE HYDROCHLORIDE 0.2 MG: 0.2 TABLET ORAL at 10:48

## 2025-09-03 RX ADMIN — HYDRALAZINE HYDROCHLORIDE 25 MG: 25 TABLET ORAL at 06:11

## 2025-09-03 RX ADMIN — SODIUM CHLORIDE: 0.45 INJECTION, SOLUTION INTRAVENOUS at 11:01

## 2025-09-03 RX ADMIN — SERTRALINE 100 MG: 100 TABLET, FILM COATED ORAL at 11:02

## 2025-09-03 RX ADMIN — TRAZODONE HYDROCHLORIDE 50 MG: 50 TABLET ORAL at 20:49

## 2025-09-03 RX ADMIN — CLONIDINE HYDROCHLORIDE 0.2 MG: 0.2 TABLET ORAL at 15:16

## 2025-09-03 RX ADMIN — TIZANIDINE 2 MG: 4 TABLET ORAL at 20:49

## 2025-09-03 RX ADMIN — HYDRALAZINE HYDROCHLORIDE 25 MG: 25 TABLET ORAL at 20:49

## 2025-09-03 RX ADMIN — CLONIDINE HYDROCHLORIDE 0.2 MG: 0.2 TABLET ORAL at 20:49

## 2025-09-03 RX ADMIN — CARBIDOPA AND LEVODOPA 1 TABLET: 10; 100 TABLET ORAL at 18:36

## 2025-09-03 RX ADMIN — DOCUSATE SODIUM 100 MG: 50 LIQUID ORAL at 16:02

## 2025-09-03 RX ADMIN — POLYETHYLENE GLYCOL 3350 17 G: 17 POWDER, FOR SOLUTION ORAL at 15:16

## 2025-09-03 RX ADMIN — CEPHALEXIN 500 MG: 500 CAPSULE ORAL at 10:49

## 2025-09-03 RX ADMIN — ACETAMINOPHEN 650 MG: 325 TABLET ORAL at 00:19

## 2025-09-03 RX ADMIN — MIRTAZAPINE 15 MG: 15 TABLET, FILM COATED ORAL at 20:49

## 2025-09-03 RX ADMIN — CARBIDOPA AND LEVODOPA 1 TABLET: 10; 100 TABLET ORAL at 06:11

## 2025-09-03 RX ADMIN — BENZOCAINE 6 MG-MENTHOL 10 MG LOZENGES 1 LOZENGE: at 18:36

## 2025-09-03 RX ADMIN — BENZONATATE 100 MG: 100 CAPSULE ORAL at 00:18

## 2025-09-03 RX ADMIN — CEPHALEXIN 500 MG: 500 CAPSULE ORAL at 20:59

## 2025-09-03 RX ADMIN — CARBIDOPA AND LEVODOPA 1 TABLET: 10; 100 TABLET ORAL at 12:45

## 2025-09-03 RX ADMIN — BUPROPION HYDROCHLORIDE 300 MG: 300 TABLET, EXTENDED RELEASE ORAL at 10:46

## 2025-09-03 RX ADMIN — MIDAZOLAM 1 MG: 1 INJECTION INTRAMUSCULAR; INTRAVENOUS at 14:08

## 2025-09-03 RX ADMIN — LEVOTHYROXINE SODIUM 25 MCG: 0.03 TABLET ORAL at 06:11

## 2025-09-03 RX ADMIN — SEVELAMER CARBONATE 800 MG: 800 TABLET, FILM COATED ORAL at 18:36

## 2025-09-03 RX ADMIN — ACETAMINOPHEN 650 MG: 325 TABLET ORAL at 06:25

## 2025-09-03 RX ADMIN — TIZANIDINE 2 MG: 4 TABLET ORAL at 10:46

## 2025-09-03 RX ADMIN — SODIUM CHLORIDE, PRESERVATIVE FREE 10 ML: 5 INJECTION INTRAVENOUS at 20:49

## 2025-09-03 RX ADMIN — WATER 2000 MG: 1 INJECTION INTRAMUSCULAR; INTRAVENOUS; SUBCUTANEOUS at 13:36

## 2025-09-03 RX ADMIN — HYDROCODONE BITARTRATE AND ACETAMINOPHEN 1 TABLET: 5; 325 TABLET ORAL at 20:49

## 2025-09-03 RX ADMIN — PANTOPRAZOLE SODIUM 40 MG: 40 TABLET, DELAYED RELEASE ORAL at 10:47

## 2025-09-03 RX ADMIN — AMLODIPINE BESYLATE 10 MG: 10 TABLET ORAL at 10:48

## 2025-09-03 RX ADMIN — CEPHALEXIN 500 MG: 500 CAPSULE ORAL at 00:18

## 2025-09-03 RX ADMIN — HYDROMORPHONE HYDROCHLORIDE 0.5 MG: 1 INJECTION, SOLUTION INTRAMUSCULAR; INTRAVENOUS; SUBCUTANEOUS at 14:08

## 2025-09-03 ASSESSMENT — PAIN - FUNCTIONAL ASSESSMENT
PAIN_FUNCTIONAL_ASSESSMENT: 0-10
PAIN_FUNCTIONAL_ASSESSMENT: 0-10
PAIN_FUNCTIONAL_ASSESSMENT: ACTIVITIES ARE NOT PREVENTED
PAIN_FUNCTIONAL_ASSESSMENT: 0-10
PAIN_FUNCTIONAL_ASSESSMENT: ACTIVITIES ARE NOT PREVENTED
PAIN_FUNCTIONAL_ASSESSMENT: 0-10

## 2025-09-03 ASSESSMENT — PAIN SCALES - GENERAL
PAINLEVEL_OUTOF10: 5
PAINLEVEL_OUTOF10: 0
PAINLEVEL_OUTOF10: 10
PAINLEVEL_OUTOF10: 4
PAINLEVEL_OUTOF10: 0

## 2025-09-03 ASSESSMENT — PAIN DESCRIPTION - DESCRIPTORS
DESCRIPTORS: SHARP
DESCRIPTORS: ACHING;SPASM

## 2025-09-03 ASSESSMENT — PAIN DESCRIPTION - PAIN TYPE: TYPE: ACUTE PAIN

## 2025-09-03 ASSESSMENT — PAIN DESCRIPTION - ORIENTATION
ORIENTATION: POSTERIOR
ORIENTATION: MID

## 2025-09-03 ASSESSMENT — PAIN DESCRIPTION - FREQUENCY: FREQUENCY: CONTINUOUS

## 2025-09-03 ASSESSMENT — PAIN DESCRIPTION - ONSET: ONSET: GRADUAL

## 2025-09-03 ASSESSMENT — PAIN DESCRIPTION - LOCATION
LOCATION: BACK
LOCATION: NECK

## 2025-09-04 VITALS
RESPIRATION RATE: 14 BRPM | HEIGHT: 60 IN | OXYGEN SATURATION: 99 % | HEART RATE: 104 BPM | SYSTOLIC BLOOD PRESSURE: 134 MMHG | WEIGHT: 116.4 LBS | TEMPERATURE: 98.2 F | BODY MASS INDEX: 22.85 KG/M2 | DIASTOLIC BLOOD PRESSURE: 71 MMHG

## 2025-09-04 LAB
ANION GAP SERPL CALC-SCNC: 14 MEQ/L (ref 8–16)
BUN SERPL-MCNC: 22 MG/DL (ref 8–23)
CALCIUM SERPL-MCNC: 9.1 MG/DL (ref 8.5–10.5)
CHLORIDE SERPL-SCNC: 98 MEQ/L (ref 98–111)
CO2 SERPL-SCNC: 24 MEQ/L (ref 22–29)
CREAT SERPL-MCNC: 3.6 MG/DL (ref 0.5–0.9)
GFR SERPL CREATININE-BSD FRML MDRD: 13 ML/MIN/1.73M2
GLUCOSE SERPL-MCNC: 82 MG/DL (ref 74–109)
POTASSIUM SERPL-SCNC: 4.3 MEQ/L (ref 3.5–5.2)
SODIUM SERPL-SCNC: 136 MEQ/L (ref 135–145)

## 2025-09-04 PROCEDURE — 97535 SELF CARE MNGMENT TRAINING: CPT

## 2025-09-04 PROCEDURE — 36415 COLL VENOUS BLD VENIPUNCTURE: CPT

## 2025-09-04 PROCEDURE — 6370000000 HC RX 637 (ALT 250 FOR IP)

## 2025-09-04 PROCEDURE — 99239 HOSP IP/OBS DSCHRG MGMT >30: CPT | Performed by: PHYSICIAN ASSISTANT

## 2025-09-04 PROCEDURE — 2500000003 HC RX 250 WO HCPCS

## 2025-09-04 PROCEDURE — 97166 OT EVAL MOD COMPLEX 45 MIN: CPT

## 2025-09-04 PROCEDURE — 80048 BASIC METABOLIC PNL TOTAL CA: CPT

## 2025-09-04 PROCEDURE — 6370000000 HC RX 637 (ALT 250 FOR IP): Performed by: STUDENT IN AN ORGANIZED HEALTH CARE EDUCATION/TRAINING PROGRAM

## 2025-09-04 PROCEDURE — 6370000000 HC RX 637 (ALT 250 FOR IP): Performed by: PHYSICIAN ASSISTANT

## 2025-09-04 PROCEDURE — 99232 SBSQ HOSP IP/OBS MODERATE 35: CPT | Performed by: INTERNAL MEDICINE

## 2025-09-04 RX ORDER — CEPHALEXIN 500 MG/1
500 CAPSULE ORAL EVERY 12 HOURS
DISCHARGE
Start: 2025-09-04 | End: 2025-09-05

## 2025-09-04 RX ORDER — FLUTICASONE PROPIONATE 50 MCG
1 SPRAY, SUSPENSION (ML) NASAL DAILY
DISCHARGE
Start: 2025-09-04

## 2025-09-04 RX ORDER — BENZONATATE 100 MG/1
100 CAPSULE ORAL 3 TIMES DAILY PRN
DISCHARGE
Start: 2025-09-04 | End: 2025-09-11

## 2025-09-04 RX ORDER — TRAZODONE HYDROCHLORIDE 100 MG/1
50 TABLET ORAL NIGHTLY
DISCHARGE
Start: 2025-09-04

## 2025-09-04 RX ADMIN — BENZONATATE 100 MG: 100 CAPSULE ORAL at 03:04

## 2025-09-04 RX ADMIN — CARBIDOPA AND LEVODOPA 1 TABLET: 10; 100 TABLET ORAL at 12:52

## 2025-09-04 RX ADMIN — LEVOTHYROXINE SODIUM 25 MCG: 0.03 TABLET ORAL at 05:59

## 2025-09-04 RX ADMIN — CEPHALEXIN 500 MG: 500 CAPSULE ORAL at 10:12

## 2025-09-04 RX ADMIN — SEVELAMER CARBONATE 800 MG: 800 TABLET, FILM COATED ORAL at 12:52

## 2025-09-04 RX ADMIN — POLYETHYLENE GLYCOL 3350 17 G: 17 POWDER, FOR SOLUTION ORAL at 10:16

## 2025-09-04 RX ADMIN — SEVELAMER CARBONATE 800 MG: 800 TABLET, FILM COATED ORAL at 10:12

## 2025-09-04 RX ADMIN — SODIUM CHLORIDE, PRESERVATIVE FREE 10 ML: 5 INJECTION INTRAVENOUS at 10:12

## 2025-09-04 RX ADMIN — BUPROPION HYDROCHLORIDE 300 MG: 300 TABLET, EXTENDED RELEASE ORAL at 10:12

## 2025-09-04 RX ADMIN — CARBIDOPA AND LEVODOPA 1 TABLET: 10; 100 TABLET ORAL at 05:59

## 2025-09-04 RX ADMIN — CLONIDINE HYDROCHLORIDE 0.2 MG: 0.2 TABLET ORAL at 10:12

## 2025-09-04 RX ADMIN — SERTRALINE 100 MG: 100 TABLET, FILM COATED ORAL at 10:12

## 2025-09-04 RX ADMIN — PANTOPRAZOLE SODIUM 40 MG: 40 TABLET, DELAYED RELEASE ORAL at 10:16

## 2025-09-04 RX ADMIN — AMLODIPINE BESYLATE 10 MG: 10 TABLET ORAL at 10:12

## 2025-09-04 RX ADMIN — HYDRALAZINE HYDROCHLORIDE 25 MG: 25 TABLET ORAL at 05:59

## 2025-09-04 ASSESSMENT — PAIN SCALES - GENERAL
PAINLEVEL_OUTOF10: 0
PAINLEVEL_OUTOF10: 2

## (undated) PROCEDURE — 0SRS0JA REPLACEMENT OF LEFT HIP JOINT, FEMORAL SURFACE WITH SYNTHETIC SUBSTITUTE, UNCEMENTED, OPEN APPROACH: ICD-10-PCS

## (undated) DEVICE — DUAL LUMEN URETERAL CATHETER

## (undated) DEVICE — 450 ML BOTTLE OF 0.05% CHLORHEXIDINE GLUCONATE IN 99.95% STERILE WATER FOR IRRIGATION, USP AND APPLICATOR.: Brand: IRRISEPT ANTIMICROBIAL WOUND LAVAGE

## (undated) DEVICE — CYSTO: Brand: MEDLINE INDUSTRIES, INC.

## (undated) DEVICE — Device

## (undated) DEVICE — PREP IM ENCHANCED TOTAL HIP BONE                                    PREPARATION KIT: Brand: PREP-IM

## (undated) DEVICE — PACK-MAJOR

## (undated) DEVICE — SUTURE ETHBND EXCEL SZ 5 L30IN NONABSORBABLE GRN L48MM V-40 MB46G

## (undated) DEVICE — GLOVE SURG SZ 75 L12IN THK75MIL DK GRN LTX FREE

## (undated) DEVICE — PAD HIP IMMOB

## (undated) DEVICE — DRAPE,U/ SHT,SPLIT,PLAS,STERIL: Brand: MEDLINE

## (undated) DEVICE — SUTURE VCRL SZ 1 L36IN ABSRB UD CTX L48MM 1/2 CIR J977H

## (undated) DEVICE — GLOVE ORANGE PI 7 1/2   MSG9075

## (undated) DEVICE — VORTEX VACUUM MIXING SYSTEM: Brand: VORTEX

## (undated) DEVICE — DRESSING HYDROFIBER AQUACEL AG ADVANTAGE 3.5X12 IN

## (undated) DEVICE — 3M™ IOBAN™ 2 ANTIMICROBIAL INCISE DRAPE 6651EZ: Brand: IOBAN™ 2

## (undated) DEVICE — HEWSON SUTURE RETRIEVER: Brand: HEWSON SUTURE RETRIEVER

## (undated) DEVICE — SUTURE STRATAFIX SZ 3-0 30CM NONABSORB UD 26MM FS 3/8 SXMP2B412

## (undated) DEVICE — PILLOW ABDUCTION LEG DISP

## (undated) DEVICE — SYSTEM PMP SGL ACT W/ 10CC VAC SYR 1 W VLV FOR ENDOSCP SURG

## (undated) DEVICE — SINGLE-USE DIGITAL FLEXIBLE URETEROSCOPE: Brand: LITHOVUE

## (undated) DEVICE — DUAL CUT SAGITTAL BLADE

## (undated) DEVICE — GLOVE ORANGE PI 8   MSG9080

## (undated) DEVICE — SUTURE ABSORBABLE MONOFILAMENT 1 OS-8 36 CM 40 MM VIO PDS +

## (undated) DEVICE — SOLUTION IRRIG 1000ML 0.9% SOD CHL USP POUR PLAS BTL

## (undated) DEVICE — SUTURE VCRL + SZ 2-0 L27IN ABSRB UD CP-1 1/2 CIR REV CUT VCP266H

## (undated) DEVICE — 3M™ IOBAN™ 2 ANTIMICROBIAL INCISE DRAPE 6650EZ: Brand: IOBAN™ 2

## (undated) DEVICE — 3M™ STERI-DRAPE™ U-DRAPE 1015: Brand: STERI-DRAPE™

## (undated) DEVICE — GUIDEWIRE URO L150CM DIA .035IN STIFF NIT HYDRPHL STR TIP

## (undated) DEVICE — SOLUTION IRRIG 3000ML 0.9% SOD CHL USP UROMATIC PLAS CONT

## (undated) DEVICE — SUTURE VIC + LEN CP1 0 70CM VCP267H

## (undated) DEVICE — SOLUTION IRRIG 1000ML STRL H2O USP PLAS POUR BTL

## (undated) DEVICE — LIQUIBAND RAPID ADHESIVE 36/CS 0.8ML: Brand: MEDLINE

## (undated) DEVICE — SUTURE ABSORBABLE MONOFILAMENT 2-0 CT-1 24 CM 36 MM VIO PDS+

## (undated) DEVICE — ADHESIVE SKIN CLOSURE WND 8.661X1.5 IN 22 CM LIQUIBAND SECUR

## (undated) DEVICE — SUTURE ETHIBOND EXCEL SZ 5 L30IN NONABSORBABLE GRN L48MM V-40 MB46G

## (undated) DEVICE — DRESSING ANITMICROBIAL FOAM OPTIFOAM POSTOP STRP 35 X 10 IN

## (undated) DEVICE — ADAPTER URO SCP UROLOK LL

## (undated) DEVICE — GLOVE SURG SZ 9 THK91MIL LTX FREE SYN POLYISOPRENE ANTI